# Patient Record
Sex: MALE | Race: BLACK OR AFRICAN AMERICAN | NOT HISPANIC OR LATINO | URBAN - METROPOLITAN AREA
[De-identification: names, ages, dates, MRNs, and addresses within clinical notes are randomized per-mention and may not be internally consistent; named-entity substitution may affect disease eponyms.]

---

## 2017-02-03 ENCOUNTER — INPATIENT (INPATIENT)
Facility: HOSPITAL | Age: 62
LOS: 3 days | Discharge: HOME HEALTH | End: 2017-02-07
Attending: INTERNAL MEDICINE | Admitting: INTERNAL MEDICINE
Payer: COMMERCIAL

## 2017-02-03 VITALS
RESPIRATION RATE: 20 BRPM | DIASTOLIC BLOOD PRESSURE: 56 MMHG | OXYGEN SATURATION: 9 % | HEART RATE: 115 BPM | SYSTOLIC BLOOD PRESSURE: 78 MMHG | TEMPERATURE: 102 F

## 2017-02-03 DIAGNOSIS — J18.9 PNEUMONIA, UNSPECIFIED ORGANISM: ICD-10-CM

## 2017-02-03 PROBLEM — Z00.00 ENCOUNTER FOR PREVENTIVE HEALTH EXAMINATION: Status: ACTIVE | Noted: 2017-02-03

## 2017-02-03 LAB
ALBUMIN SERPL ELPH-MCNC: 2.9 G/DL — LOW (ref 3.3–5)
ALP SERPL-CCNC: 131 U/L — HIGH (ref 40–120)
ALT FLD-CCNC: 18 U/L — SIGNIFICANT CHANGE UP (ref 4–41)
APTT BLD: 47.2 SEC — HIGH (ref 27.5–37.4)
AST SERPL-CCNC: 98 U/L — HIGH (ref 4–40)
B PERT DNA SPEC QL NAA+PROBE: SIGNIFICANT CHANGE UP
BASE EXCESS BLDV CALC-SCNC: 5 MMOL/L — SIGNIFICANT CHANGE UP
BASOPHILS # BLD AUTO: 0.01 K/UL — SIGNIFICANT CHANGE UP (ref 0–0.2)
BASOPHILS NFR BLD AUTO: 0.1 % — SIGNIFICANT CHANGE UP (ref 0–2)
BILIRUB SERPL-MCNC: 1.2 MG/DL — SIGNIFICANT CHANGE UP (ref 0.2–1.2)
BLOOD GAS VENOUS - CREATININE: 5.71 MG/DL — HIGH (ref 0.5–1.3)
BUN SERPL-MCNC: 30 MG/DL — HIGH (ref 7–23)
C PNEUM DNA SPEC QL NAA+PROBE: NOT DETECTED — SIGNIFICANT CHANGE UP
CALCIUM SERPL-MCNC: 9.4 MG/DL — SIGNIFICANT CHANGE UP (ref 8.4–10.5)
CHLORIDE BLDV-SCNC: 95 MMOL/L — LOW (ref 96–108)
CHLORIDE SERPL-SCNC: 89 MMOL/L — LOW (ref 98–107)
CO2 SERPL-SCNC: 25 MMOL/L — SIGNIFICANT CHANGE UP (ref 22–31)
CREAT SERPL-MCNC: 5.14 MG/DL — HIGH (ref 0.5–1.3)
EOSINOPHIL # BLD AUTO: 0.02 K/UL — SIGNIFICANT CHANGE UP (ref 0–0.5)
EOSINOPHIL NFR BLD AUTO: 0.3 % — SIGNIFICANT CHANGE UP (ref 0–6)
FLUAV H1 2009 PAND RNA SPEC QL NAA+PROBE: POSITIVE — HIGH
FLUAV H1 RNA SPEC QL NAA+PROBE: NOT DETECTED — SIGNIFICANT CHANGE UP
FLUAV H3 RNA SPEC QL NAA+PROBE: NOT DETECTED — SIGNIFICANT CHANGE UP
FLUBV RNA SPEC QL NAA+PROBE: NOT DETECTED — SIGNIFICANT CHANGE UP
GAS PNL BLDV: 127 MMOL/L — LOW (ref 136–146)
GLUCOSE BLDV-MCNC: 218 — HIGH (ref 70–99)
GLUCOSE SERPL-MCNC: 215 MG/DL — HIGH (ref 70–99)
HADV DNA SPEC QL NAA+PROBE: NOT DETECTED — SIGNIFICANT CHANGE UP
HCO3 BLDV-SCNC: 28 MMOL/L — HIGH (ref 20–27)
HCOV 229E RNA SPEC QL NAA+PROBE: NOT DETECTED — SIGNIFICANT CHANGE UP
HCOV HKU1 RNA SPEC QL NAA+PROBE: NOT DETECTED — SIGNIFICANT CHANGE UP
HCOV NL63 RNA SPEC QL NAA+PROBE: NOT DETECTED — SIGNIFICANT CHANGE UP
HCOV OC43 RNA SPEC QL NAA+PROBE: NOT DETECTED — SIGNIFICANT CHANGE UP
HCT VFR BLD CALC: 35.6 % — LOW (ref 39–50)
HCT VFR BLDV CALC: 33 % — LOW (ref 39–51)
HGB BLD-MCNC: 10.6 G/DL — LOW (ref 13–17)
HGB BLDV-MCNC: 10.7 G/DL — LOW (ref 13–17)
HMPV RNA SPEC QL NAA+PROBE: NOT DETECTED — SIGNIFICANT CHANGE UP
HPIV1 RNA SPEC QL NAA+PROBE: NOT DETECTED — SIGNIFICANT CHANGE UP
HPIV2 RNA SPEC QL NAA+PROBE: NOT DETECTED — SIGNIFICANT CHANGE UP
HPIV3 RNA SPEC QL NAA+PROBE: NOT DETECTED — SIGNIFICANT CHANGE UP
HPIV4 RNA SPEC QL NAA+PROBE: NOT DETECTED — SIGNIFICANT CHANGE UP
IMM GRANULOCYTES NFR BLD AUTO: 0.3 % — SIGNIFICANT CHANGE UP (ref 0–1.5)
INR BLD: 2.36 — HIGH (ref 0.87–1.18)
LACTATE BLDV-MCNC: 2.6 MMOL/L — HIGH (ref 0.5–2)
LYMPHOCYTES # BLD AUTO: 0.83 K/UL — LOW (ref 1–3.3)
LYMPHOCYTES # BLD AUTO: 10.6 % — LOW (ref 13–44)
M PNEUMO DNA SPEC QL NAA+PROBE: NOT DETECTED — SIGNIFICANT CHANGE UP
MCHC RBC-ENTMCNC: 27.9 PG — SIGNIFICANT CHANGE UP (ref 27–34)
MCHC RBC-ENTMCNC: 29.8 % — LOW (ref 32–36)
MCV RBC AUTO: 93.7 FL — SIGNIFICANT CHANGE UP (ref 80–100)
MONOCYTES # BLD AUTO: 1.22 K/UL — HIGH (ref 0–0.9)
MONOCYTES NFR BLD AUTO: 15.6 % — HIGH (ref 2–14)
NEUTROPHILS # BLD AUTO: 5.73 K/UL — SIGNIFICANT CHANGE UP (ref 1.8–7.4)
NEUTROPHILS NFR BLD AUTO: 73.1 % — SIGNIFICANT CHANGE UP (ref 43–77)
PCO2 BLDV: 48 MMHG — SIGNIFICANT CHANGE UP (ref 41–51)
PH BLDV: 7.41 PH — SIGNIFICANT CHANGE UP (ref 7.32–7.43)
PLATELET # BLD AUTO: 171 K/UL — SIGNIFICANT CHANGE UP (ref 150–400)
PMV BLD: 11.3 FL — SIGNIFICANT CHANGE UP (ref 7–13)
PO2 BLDV: 36 MMHG — SIGNIFICANT CHANGE UP (ref 35–40)
POTASSIUM BLDV-SCNC: SIGNIFICANT CHANGE UP MMOL/L (ref 3.4–4.5)
POTASSIUM SERPL-MCNC: SIGNIFICANT CHANGE UP MMOL/L (ref 3.5–5.3)
POTASSIUM SERPL-SCNC: SIGNIFICANT CHANGE UP MMOL/L (ref 3.5–5.3)
PROT SERPL-MCNC: 7.9 G/DL — SIGNIFICANT CHANGE UP (ref 6–8.3)
PROTHROM AB SERPL-ACNC: 27.1 SEC — HIGH (ref 10–13.1)
RBC # BLD: 3.8 M/UL — LOW (ref 4.2–5.8)
RBC # FLD: 19.5 % — HIGH (ref 10.3–14.5)
RSV RNA SPEC QL NAA+PROBE: NOT DETECTED — SIGNIFICANT CHANGE UP
RV+EV RNA SPEC QL NAA+PROBE: NOT DETECTED — SIGNIFICANT CHANGE UP
SAO2 % BLDV: 61.7 % — SIGNIFICANT CHANGE UP (ref 60–85)
SODIUM SERPL-SCNC: 129 MMOL/L — LOW (ref 135–145)
WBC # BLD: 7.83 K/UL — SIGNIFICANT CHANGE UP (ref 3.8–10.5)
WBC # FLD AUTO: 7.83 K/UL — SIGNIFICANT CHANGE UP (ref 3.8–10.5)

## 2017-02-03 PROCEDURE — 71010: CPT | Mod: 26

## 2017-02-03 RX ORDER — ACETAMINOPHEN 500 MG
650 TABLET ORAL ONCE
Qty: 0 | Refills: 0 | Status: COMPLETED | OUTPATIENT
Start: 2017-02-03 | End: 2017-02-03

## 2017-02-03 RX ORDER — MIDODRINE HYDROCHLORIDE 2.5 MG/1
10 TABLET ORAL ONCE
Qty: 0 | Refills: 0 | Status: COMPLETED | OUTPATIENT
Start: 2017-02-03 | End: 2017-02-03

## 2017-02-03 RX ORDER — CEFEPIME 1 G/1
2000 INJECTION, POWDER, FOR SOLUTION INTRAMUSCULAR; INTRAVENOUS ONCE
Qty: 0 | Refills: 0 | Status: COMPLETED | OUTPATIENT
Start: 2017-02-03 | End: 2017-02-03

## 2017-02-03 RX ORDER — VANCOMYCIN HCL 1 G
1000 VIAL (EA) INTRAVENOUS ONCE
Qty: 0 | Refills: 0 | Status: COMPLETED | OUTPATIENT
Start: 2017-02-03 | End: 2017-02-03

## 2017-02-03 RX ORDER — DOBUTAMINE HCL 250MG/20ML
3.01 VIAL (ML) INTRAVENOUS
Qty: 1000 | Refills: 0 | Status: DISCONTINUED | OUTPATIENT
Start: 2017-02-03 | End: 2017-02-07

## 2017-02-03 RX ORDER — SODIUM CHLORIDE 9 MG/ML
500 INJECTION INTRAMUSCULAR; INTRAVENOUS; SUBCUTANEOUS ONCE
Qty: 0 | Refills: 0 | Status: DISCONTINUED | OUTPATIENT
Start: 2017-02-03 | End: 2017-02-03

## 2017-02-03 RX ORDER — CIPROFLOXACIN LACTATE 400MG/40ML
400 VIAL (ML) INTRAVENOUS ONCE
Qty: 0 | Refills: 0 | Status: COMPLETED | OUTPATIENT
Start: 2017-02-03 | End: 2017-02-03

## 2017-02-03 RX ORDER — NOREPINEPHRINE BITARTRATE/D5W 8 MG/250ML
0.01 PLASTIC BAG, INJECTION (ML) INTRAVENOUS
Qty: 8 | Refills: 0 | Status: DISCONTINUED | OUTPATIENT
Start: 2017-02-03 | End: 2017-02-03

## 2017-02-03 RX ADMIN — Medication 250 MILLIGRAM(S): at 23:09

## 2017-02-03 RX ADMIN — MIDODRINE HYDROCHLORIDE 10 MILLIGRAM(S): 2.5 TABLET ORAL at 22:10

## 2017-02-03 RX ADMIN — Medication 200 MILLIGRAM(S): at 21:04

## 2017-02-03 RX ADMIN — Medication 650 MILLIGRAM(S): at 21:03

## 2017-02-03 RX ADMIN — Medication 30 MILLIGRAM(S): at 23:09

## 2017-02-03 RX ADMIN — CEFEPIME 100 MILLIGRAM(S): 1 INJECTION, POWDER, FOR SOLUTION INTRAMUSCULAR; INTRAVENOUS at 21:37

## 2017-02-03 NOTE — ED ADULT NURSE NOTE - CHIEF COMPLAINT QUOTE
Pt c/o weakness, chills x few days. +SOB on NRB in triage with 95% O2 sat. Pt on constant dobutamine drip through chest. Pt denies CP. HX dialysis, DM, weak heart. f/s 198

## 2017-02-03 NOTE — ED PROVIDER NOTE - PMH
BPH (benign prostatic hypertrophy)    CHF (congestive heart failure)    Chronic renal insufficiency    Diabetes mellitus    Dyslipidemia    Gout    Myocardial infarction  10/2011

## 2017-02-03 NOTE — ED ADULT NURSE REASSESSMENT NOTE - NS ED NURSE REASSESS COMMENT FT1
Pt remains at baseline mental status- VS as noted, MD aware. Per pt, baseline BP "runs low." Second IV access initiated- 20G placed in L AC. Pt appears comfortably in stretcher, respirations even and unlabored, nad noted at this time. Will monitor patient closely.

## 2017-02-03 NOTE — ED PROVIDER NOTE - OBJECTIVE STATEMENT
60yo male on home O2 2L with a pmh of CHF on dobutamine infusion, AICD, MI, ESRD (HD on M, T, Th, Sat), HTN, hyperlipidemia, gout, BPH presents with fever, cough and weakness. Pt for the past 3 days has had a cough, non productive and chills. Pt also with generalized weakness and sob. Pt denies cp, abd pain/n/v/d, urinary symptoms, recent travel. Pt as per family has appeared more confused the past 2 days. Pt with ha and neck pain.

## 2017-02-03 NOTE — ED PROVIDER NOTE - CRITICAL CARE PROVIDED
documentation/consultation with other physicians/consult w/ pt's family directly relating to pts condition/direct patient care (not related to procedure)/interpretation of diagnostic studies/additional history taking

## 2017-02-03 NOTE — ED PROCEDURE NOTE - PROCEDURE ADDITIONAL DETAILS
Focused ED Transthoracic echo 36169 - indication (  SOB, sepsis, eval for volume tolerance    )    Grey scale imaging obtained in three views ( parasternal long, parasternal short, apical.)    No pericardial effusion noted.  No evidence of cardiac tamponade  LV contractility - severely decreased .  RV normal size.    Impression: no pericardial effusion, normal contractility, RV normal size.  Dexeus. Focused ED Transthoracic echo 96806 - indication (  SOB, sepsis, eval for volume tolerance    )    Grey scale imaging obtained in three views ( parasternal long, parasternal short, apical.)    No pericardial effusion noted.  No evidence of cardiac tamponade  LV contractility - severely decreased .  RV same size as LV.  Decreased RV function.  IVC 2.57 - 2.31cm with sniff.    Impression: no pericardial effusion, severely decreased LV contractility, RV normal size.  Dexeus. Focused ED Transthoracic echo 15186 - indication (  SOB, sepsis, eval for volume tolerance    )    Grey scale imaging obtained in three views ( parasternal long, parasternal short, apical.)    No pericardial effusion noted.  No evidence of cardiac tamponade  LV contractility - severely decreased .  RV same size as LV.  Decreased RV function.  IVC 2.57 - 2.31cm with sniff.    Impression: no pericardial effusion, severely decreased LV contractility, RV dilated, RV decreased function.  IVC plethoric with minimal respiratory variation.    Dexeus.    ED focused limited thoracic US 71555    Focused ED ultrasound of the lungs and pleura:    Findings: Normal lung sliding bilaterally  Bilateral B-lines seen anteriorly  No pleural effusions  No pneumonia visualized    Impression: Abnormal focused lung ultrasound - Bilateral interstitial syndrome.      Procedure note and images placed in chart  Dexeus

## 2017-02-03 NOTE — ED PROVIDER NOTE - PSH
AICD (automatic cardioverter/defibrillator) present  Biotroik - placed 9/11/09  H/O coronary angiogram

## 2017-02-03 NOTE — ED PROVIDER NOTE - ATTENDING CONTRIBUTION TO CARE
61M p/w fever, nonproductive cough, general weakness x 3d.  Family reports somewhat worsening confusion as well.  (+)Sick contact in family.  Pt known severe CHF with dobutamine drip as well as ESRD requiring dialysis 4 times per week, as well as chronically low BP - take midodrine TID.  On home oxygen as well.  VS:  febrile, tachycardic, hypotensive, hypoxic    GEN - mild resp distress; chronically ill appearing; mild confusion  HEAD - NC/AT     ENT - PEERL, EOMI, mucous membranes dry , no discharge      NECK: Neck supple, non-tender without lymphadenopathy, no masses, (+) JVD  PULM - bilat,  symmetric breath sounds.   R ACW permacath and R SC central line c/d/i.   COR -  normal heart sounds    ABD - , mild distension, NT, soft, no guarding, no rebound, no masses    BACK - no CVA tenderness, nontender spine     EXTREMS - Bilat LE edema, no deformity, warm and well perfused    SKIN - no rash or bruising      NEUROLOGIC - alert, CN 2-12 intact, sensation nl, motor generally weak but moving all extrems.      IMP:  61M p/w fever, cough, congestion, (+)sick contacts as well as high fever here and hypotensive.  Bedside US shows pt unlikely to improve with fluids, likely to hurt pt as IVC is full, LVF severely reduced, and bilateral B-lines anteriorly.  Continue  drip.  CXR showing likely pna.  Rx abx, check labs and cultures.  MICU eval recommended additional midodrine, pt at baseline BP, feeling a little better, admit.

## 2017-02-03 NOTE — ED PROVIDER NOTE - MEDICAL DECISION MAKING DETAILS
62yo male on home O2 2L with a pmh of CHF on dobutamine infusion, AICD, MI, ESRD (HD on M, T, Th, Sat), HTN, hyperlipidemia, gout, BPH presents with fever, cough and weakness - sepsis work up

## 2017-02-03 NOTE — ED ADULT TRIAGE NOTE - CHIEF COMPLAINT QUOTE
Pt c/o weakness, chills x few days. +SOB on NRB in triage with 95% O2 sat. Pt on constant dopamine drip through chest. Pt denies CP. HX dialysis, DM, weak heart. f/s 198 Charge RN notified that pt is code sepsis. Pt c/o weakness, chills x few days. +SOB on NRB in triage with 95% O2 sat. Pt on constant dobutamine drip through chest. Pt denies CP. HX dialysis, DM, weak heart. f/s 198

## 2017-02-03 NOTE — ED ADULT NURSE NOTE - OBJECTIVE STATEMENT
Facilitator RN: Received pt in spot #5. Pt aox3 arrives from home for worsening shortness of breath. Hx of CHF and ESRD (dialysis mon, tues, thurs, fri). Added extra day due to increased body swelling and shortness of breath. Pt has right chest wall shiley and last dialysis yesterday. C/o cough with chills since Wednesday. Pt also c/o increased shortness of breath. Denies chest pain. Pt has continuous dobutamine gtt (4mg/ml @4.5ml/hr = 18mg/hr) via right upper chest double lumen PICC line. Arrives from home on 100%NRB - O2 sat 100%. +1 generalized edema. Family at beside. Pt febrile in triage. CODE sepsis called. MD evaluation in progress. Report given to primary RN.

## 2017-02-03 NOTE — ED PROVIDER NOTE - PROGRESS NOTE DETAILS
Discussed need to admit with patient & discussed risk and benefits.  Patient agreed to admission.  Discussed case w/ admitting medicine doctor - agreed to admit to their service.  MAR contacted.

## 2017-02-04 DIAGNOSIS — J11.1 INFLUENZA DUE TO UNIDENTIFIED INFLUENZA VIRUS WITH OTHER RESPIRATORY MANIFESTATIONS: ICD-10-CM

## 2017-02-04 DIAGNOSIS — N40.0 BENIGN PROSTATIC HYPERPLASIA WITHOUT LOWER URINARY TRACT SYMPTOMS: ICD-10-CM

## 2017-02-04 DIAGNOSIS — E78.5 HYPERLIPIDEMIA, UNSPECIFIED: ICD-10-CM

## 2017-02-04 DIAGNOSIS — I48.91 UNSPECIFIED ATRIAL FIBRILLATION: ICD-10-CM

## 2017-02-04 DIAGNOSIS — N18.6 END STAGE RENAL DISEASE: ICD-10-CM

## 2017-02-04 DIAGNOSIS — J18.9 PNEUMONIA, UNSPECIFIED ORGANISM: ICD-10-CM

## 2017-02-04 DIAGNOSIS — Z41.8 ENCOUNTER FOR OTHER PROCEDURES FOR PURPOSES OTHER THAN REMEDYING HEALTH STATE: ICD-10-CM

## 2017-02-04 DIAGNOSIS — I50.22 CHRONIC SYSTOLIC (CONGESTIVE) HEART FAILURE: ICD-10-CM

## 2017-02-04 DIAGNOSIS — E11.9 TYPE 2 DIABETES MELLITUS WITHOUT COMPLICATIONS: ICD-10-CM

## 2017-02-04 LAB
ALBUMIN SERPL ELPH-MCNC: 2.6 G/DL — LOW (ref 3.3–5)
ALP SERPL-CCNC: 130 U/L — HIGH (ref 40–120)
ALT FLD-CCNC: 10 U/L — SIGNIFICANT CHANGE UP (ref 4–41)
APTT BLD: 44.1 SEC — HIGH (ref 27.5–37.4)
AST SERPL-CCNC: 24 U/L — SIGNIFICANT CHANGE UP (ref 4–40)
BILIRUB SERPL-MCNC: 1.1 MG/DL — SIGNIFICANT CHANGE UP (ref 0.2–1.2)
BUN SERPL-MCNC: 32 MG/DL — HIGH (ref 7–23)
CALCIUM SERPL-MCNC: 9.4 MG/DL — SIGNIFICANT CHANGE UP (ref 8.4–10.5)
CHLORIDE SERPL-SCNC: 91 MMOL/L — LOW (ref 98–107)
CHOLEST SERPL-MCNC: 80 MG/DL — LOW (ref 120–199)
CK MB BLD-MCNC: 0.5 — SIGNIFICANT CHANGE UP (ref 0–2.5)
CK MB BLD-MCNC: 1 — SIGNIFICANT CHANGE UP (ref 0–2.5)
CK MB BLD-MCNC: 1.1 NG/ML — SIGNIFICANT CHANGE UP (ref 1–6.6)
CK MB BLD-MCNC: 2.11 NG/ML — SIGNIFICANT CHANGE UP (ref 1–6.6)
CK SERPL-CCNC: 206 U/L — HIGH (ref 30–200)
CK SERPL-CCNC: 214 U/L — HIGH (ref 30–200)
CO2 SERPL-SCNC: 25 MMOL/L — SIGNIFICANT CHANGE UP (ref 22–31)
CREAT SERPL-MCNC: 5.75 MG/DL — HIGH (ref 0.5–1.3)
GLUCOSE SERPL-MCNC: 148 MG/DL — HIGH (ref 70–99)
HBA1C BLD-MCNC: 6.7 % — HIGH (ref 4–5.6)
HCT VFR BLD CALC: 32.9 % — LOW (ref 39–50)
HDLC SERPL-MCNC: 24 MG/DL — LOW (ref 35–55)
HGB BLD-MCNC: 9.8 G/DL — LOW (ref 13–17)
INR BLD: 2.42 — HIGH (ref 0.87–1.18)
LIPID PNL WITH DIRECT LDL SERPL: 37 MG/DL — SIGNIFICANT CHANGE UP
MAGNESIUM SERPL-MCNC: 2.1 MG/DL — SIGNIFICANT CHANGE UP (ref 1.6–2.6)
MCHC RBC-ENTMCNC: 27.7 PG — SIGNIFICANT CHANGE UP (ref 27–34)
MCHC RBC-ENTMCNC: 29.8 % — LOW (ref 32–36)
MCV RBC AUTO: 92.9 FL — SIGNIFICANT CHANGE UP (ref 80–100)
NT-PROBNP SERPL-SCNC: SIGNIFICANT CHANGE UP PG/ML
PHOSPHATE SERPL-MCNC: 3.5 MG/DL — SIGNIFICANT CHANGE UP (ref 2.5–4.5)
PLATELET # BLD AUTO: 157 K/UL — SIGNIFICANT CHANGE UP (ref 150–400)
PMV BLD: 11.4 FL — SIGNIFICANT CHANGE UP (ref 7–13)
POTASSIUM SERPL-MCNC: 3.9 MMOL/L — SIGNIFICANT CHANGE UP (ref 3.5–5.3)
POTASSIUM SERPL-SCNC: 3.9 MMOL/L — SIGNIFICANT CHANGE UP (ref 3.5–5.3)
PROT SERPL-MCNC: 7.1 G/DL — SIGNIFICANT CHANGE UP (ref 6–8.3)
PROTHROM AB SERPL-ACNC: 27.8 SEC — HIGH (ref 10–13.1)
RBC # BLD: 3.54 M/UL — LOW (ref 4.2–5.8)
RBC # FLD: 19.8 % — HIGH (ref 10.3–14.5)
SODIUM SERPL-SCNC: 131 MMOL/L — LOW (ref 135–145)
SPECIMEN SOURCE: SIGNIFICANT CHANGE UP
SPECIMEN SOURCE: SIGNIFICANT CHANGE UP
TRIGL SERPL-MCNC: 95 MG/DL — SIGNIFICANT CHANGE UP (ref 10–149)
TROPONIN T SERPL-MCNC: 0.24 NG/ML — HIGH (ref 0–0.06)
TROPONIN T SERPL-MCNC: 0.32 NG/ML — HIGH (ref 0–0.06)
TSH SERPL-MCNC: 1.61 UIU/ML — SIGNIFICANT CHANGE UP (ref 0.27–4.2)
WBC # BLD: 6.49 K/UL — SIGNIFICANT CHANGE UP (ref 3.8–10.5)
WBC # FLD AUTO: 6.49 K/UL — SIGNIFICANT CHANGE UP (ref 3.8–10.5)

## 2017-02-04 PROCEDURE — 99222 1ST HOSP IP/OBS MODERATE 55: CPT | Mod: GC

## 2017-02-04 PROCEDURE — 93010 ELECTROCARDIOGRAM REPORT: CPT

## 2017-02-04 RX ORDER — GLUCAGON INJECTION, SOLUTION 0.5 MG/.1ML
1 INJECTION, SOLUTION SUBCUTANEOUS ONCE
Qty: 0 | Refills: 0 | Status: DISCONTINUED | OUTPATIENT
Start: 2017-02-04 | End: 2017-02-07

## 2017-02-04 RX ORDER — AMIODARONE HYDROCHLORIDE 400 MG/1
200 TABLET ORAL DAILY
Qty: 0 | Refills: 0 | Status: DISCONTINUED | OUTPATIENT
Start: 2017-02-04 | End: 2017-02-07

## 2017-02-04 RX ORDER — LEVOTHYROXINE SODIUM 125 MCG
75 TABLET ORAL DAILY
Qty: 0 | Refills: 0 | Status: DISCONTINUED | OUTPATIENT
Start: 2017-02-04 | End: 2017-02-07

## 2017-02-04 RX ORDER — DEXTROSE 50 % IN WATER 50 %
1 SYRINGE (ML) INTRAVENOUS ONCE
Qty: 0 | Refills: 0 | Status: DISCONTINUED | OUTPATIENT
Start: 2017-02-04 | End: 2017-02-07

## 2017-02-04 RX ORDER — INSULIN LISPRO 100/ML
VIAL (ML) SUBCUTANEOUS
Qty: 0 | Refills: 0 | Status: DISCONTINUED | OUTPATIENT
Start: 2017-02-04 | End: 2017-02-07

## 2017-02-04 RX ORDER — INSULIN LISPRO 100/ML
VIAL (ML) SUBCUTANEOUS AT BEDTIME
Qty: 0 | Refills: 0 | Status: DISCONTINUED | OUTPATIENT
Start: 2017-02-04 | End: 2017-02-07

## 2017-02-04 RX ORDER — SEVELAMER CARBONATE 2400 MG/1
800 POWDER, FOR SUSPENSION ORAL
Qty: 0 | Refills: 0 | Status: DISCONTINUED | OUTPATIENT
Start: 2017-02-04 | End: 2017-02-07

## 2017-02-04 RX ORDER — CEFEPIME 1 G/1
500 INJECTION, POWDER, FOR SOLUTION INTRAMUSCULAR; INTRAVENOUS EVERY 24 HOURS
Qty: 0 | Refills: 0 | Status: DISCONTINUED | OUTPATIENT
Start: 2017-02-04 | End: 2017-02-04

## 2017-02-04 RX ORDER — INSULIN GLARGINE 100 [IU]/ML
4 INJECTION, SOLUTION SUBCUTANEOUS AT BEDTIME
Qty: 0 | Refills: 0 | Status: DISCONTINUED | OUTPATIENT
Start: 2017-02-04 | End: 2017-02-07

## 2017-02-04 RX ORDER — WARFARIN SODIUM 2.5 MG/1
3 TABLET ORAL ONCE
Qty: 0 | Refills: 0 | Status: COMPLETED | OUTPATIENT
Start: 2017-02-04 | End: 2017-02-04

## 2017-02-04 RX ORDER — SODIUM CHLORIDE 9 MG/ML
1000 INJECTION, SOLUTION INTRAVENOUS
Qty: 0 | Refills: 0 | Status: DISCONTINUED | OUTPATIENT
Start: 2017-02-04 | End: 2017-02-07

## 2017-02-04 RX ORDER — ATORVASTATIN CALCIUM 80 MG/1
20 TABLET, FILM COATED ORAL AT BEDTIME
Qty: 0 | Refills: 0 | Status: DISCONTINUED | OUTPATIENT
Start: 2017-02-04 | End: 2017-02-07

## 2017-02-04 RX ORDER — MIDODRINE HYDROCHLORIDE 2.5 MG/1
30 TABLET ORAL EVERY 8 HOURS
Qty: 0 | Refills: 0 | Status: DISCONTINUED | OUTPATIENT
Start: 2017-02-04 | End: 2017-02-07

## 2017-02-04 RX ORDER — DEXTROSE 50 % IN WATER 50 %
25 SYRINGE (ML) INTRAVENOUS ONCE
Qty: 0 | Refills: 0 | Status: DISCONTINUED | OUTPATIENT
Start: 2017-02-04 | End: 2017-02-07

## 2017-02-04 RX ORDER — VANCOMYCIN HCL 1 G
750 VIAL (EA) INTRAVENOUS EVERY 24 HOURS
Qty: 0 | Refills: 0 | Status: DISCONTINUED | OUTPATIENT
Start: 2017-02-04 | End: 2017-02-04

## 2017-02-04 RX ORDER — CLOPIDOGREL BISULFATE 75 MG/1
75 TABLET, FILM COATED ORAL DAILY
Qty: 0 | Refills: 0 | Status: DISCONTINUED | OUTPATIENT
Start: 2017-02-04 | End: 2017-02-07

## 2017-02-04 RX ORDER — FINASTERIDE 5 MG/1
5 TABLET, FILM COATED ORAL DAILY
Qty: 0 | Refills: 0 | Status: DISCONTINUED | OUTPATIENT
Start: 2017-02-04 | End: 2017-02-07

## 2017-02-04 RX ORDER — DEXTROSE 50 % IN WATER 50 %
12.5 SYRINGE (ML) INTRAVENOUS ONCE
Qty: 0 | Refills: 0 | Status: DISCONTINUED | OUTPATIENT
Start: 2017-02-04 | End: 2017-02-07

## 2017-02-04 RX ADMIN — SEVELAMER CARBONATE 800 MILLIGRAM(S): 2400 POWDER, FOR SUSPENSION ORAL at 13:28

## 2017-02-04 RX ADMIN — MIDODRINE HYDROCHLORIDE 30 MILLIGRAM(S): 2.5 TABLET ORAL at 06:13

## 2017-02-04 RX ADMIN — Medication 4.5 MICROGRAM(S)/KG/MIN: at 06:14

## 2017-02-04 RX ADMIN — FINASTERIDE 5 MILLIGRAM(S): 5 TABLET, FILM COATED ORAL at 13:28

## 2017-02-04 RX ADMIN — WARFARIN SODIUM 3 MILLIGRAM(S): 2.5 TABLET ORAL at 18:23

## 2017-02-04 RX ADMIN — CLOPIDOGREL BISULFATE 75 MILLIGRAM(S): 75 TABLET, FILM COATED ORAL at 13:28

## 2017-02-04 RX ADMIN — Medication 4.5 MICROGRAM(S)/KG/MIN: at 00:54

## 2017-02-04 RX ADMIN — MIDODRINE HYDROCHLORIDE 30 MILLIGRAM(S): 2.5 TABLET ORAL at 22:10

## 2017-02-04 RX ADMIN — SEVELAMER CARBONATE 800 MILLIGRAM(S): 2400 POWDER, FOR SUSPENSION ORAL at 17:16

## 2017-02-04 RX ADMIN — MIDODRINE HYDROCHLORIDE 30 MILLIGRAM(S): 2.5 TABLET ORAL at 13:28

## 2017-02-04 RX ADMIN — Medication 4.5 MICROGRAM(S)/KG/MIN: at 20:42

## 2017-02-04 RX ADMIN — Medication 30 MILLIGRAM(S): at 13:28

## 2017-02-04 RX ADMIN — Medication 75 MICROGRAM(S): at 06:13

## 2017-02-04 RX ADMIN — INSULIN GLARGINE 4 UNIT(S): 100 INJECTION, SOLUTION SUBCUTANEOUS at 22:11

## 2017-02-04 RX ADMIN — AMIODARONE HYDROCHLORIDE 200 MILLIGRAM(S): 400 TABLET ORAL at 06:13

## 2017-02-04 RX ADMIN — SEVELAMER CARBONATE 800 MILLIGRAM(S): 2400 POWDER, FOR SUSPENSION ORAL at 09:36

## 2017-02-04 RX ADMIN — ATORVASTATIN CALCIUM 20 MILLIGRAM(S): 80 TABLET, FILM COATED ORAL at 22:10

## 2017-02-04 NOTE — H&P ADULT. - PROBLEM SELECTOR PLAN 2
RVP positive for Influenza and was given Tamiflu 30mg in the ED  Will continue with Tamiflu 30mg QD for 5 days   Placed on Droplet and Contact precautions

## 2017-02-04 NOTE — H&P ADULT. - HISTORY OF PRESENT ILLNESS
62 y/o M with PMH of Afib(on Coumadin), Severe CHF(on Dobutamine drip and Midodrine for chronic low BP and on chronic Home O2 at 2L), ESRD(on HD thru right chest wall shiley on Mon,Tues,Thurs, Sat), HTN, HLD, Gout, BPH presented with fever, cough and weakness. As per the patient he developed these symptoms about 3 days ago. Patient stated that his wife was sick at home. Patient stated that he has been having dry cough, with subjective fever and chills. Patient also endorsed generalized weakness and SOB for the past few days. Patient also endorsed intermittent LE swelling and normally sleeps with 2 pillows at night. Patient also endorsed orthopnea and PND. Patient denied any CP, N/V/D/C, headaches, abdominal pain, melena, hematochezia, recent travel, pleuritic or positional chest pain.     On ED admission EKG revealed NSR at a rate of 79 with left axis deviation with TWI in lead AVL with Qtc of 481, H&H: 10.6/35.6, RVP: Flu Positive, Na: 129, BUN/Cr: 30/5.14, Gluc: 215. Prelim CXR: Right upper lobe and bilateral lower lobe opacities concerning for multifocal pneumonia. Patient was given 1x dose of PO Midodrine 10mg in the ED, and was given IV Cefepime, Vancomycin and Cipro in the ED. Patient was seen by MICU who recommended increase Midodrine to 30mg Q8hrs, ABX, Check RVP. Not a MICU candidate. When examined patient is resting in the stretcher and stated that "his breathing is somewhat better from when he came in".

## 2017-02-04 NOTE — H&P ADULT. - RS GEN PE MLT RESP DETAILS PC
no wheezes/rales/no rhonchi/no intercostal retractions/no chest wall tenderness/normal/airway patent/respirations non-labored

## 2017-02-04 NOTE — ED ADULT NURSE REASSESSMENT NOTE - NS ED NURSE REASSESS COMMENT FT1
Pt remains A&Ox3, denies pain/discomfort, dizziness, lightheadedness at this time. Pt appears comfortably in stretcher, respirations even and unlabored, nad noted at this time. Pt's dobutamine pump stopped and dobutamine started on Alaris pump with pt's same at home dose. Report given, awaiting pt transport. Will monitor patient closely.

## 2017-02-04 NOTE — H&P ADULT. - VASCULAR DETAILS
Right chest wall Shiley in place with dressing clean/dry/intact  Right chest PICC line in place, no active drainage noted

## 2017-02-04 NOTE — H&P ADULT. - ASSESSMENT
60 y/o M with PMH of Afib(on Coumadin), Severe CHF(on Dobutamine drip and Midodrine for chronic low BP and on chronic Home O2 at 2L), ESRD(on HD thru right chest wall shiley on Mon,Tues,Thurs, Sat), HTN, HLD, Gout, BPH presented with fever, cough and weakness    +Possible HCAP-On IV Vancomycin and Cefepime   +Influenza positive-On Tamiflu  +Severe decompensated HF-On Midodrine for BP control and Dobutamine drip

## 2017-02-04 NOTE — H&P ADULT. - PROBLEM SELECTOR PLAN 4
Patient gets dialysis thru the right chest wall on Mon/Tue/Thurs/Sat  Will need to call [facility] in am for inpatient dialysis   Avoid nephrotoxic medications   Continue with Renvela daily

## 2017-02-04 NOTE — H&P ADULT. - PROBLEM SELECTOR PLAN 1
On Prelim CXR revealed "Right upper lobe and bilateral lower lobe opacities concerning for multifocal pneumonia" and patient received 1x dose of Vancomycin, Cefepime and Cipro in the ED  Will continue with IV Vancomycin 750mg [id number] and IV Cefepime 500mg [id number]   Blood cultures ordered   Consider ID consult in am if warranted

## 2017-02-04 NOTE — H&P ADULT. - NEGATIVE OPHTHALMOLOGIC SYMPTOMS
no photophobia/no discharge R/no lacrimation R/no blurred vision R/no discharge L/no blurred vision L/no lacrimation L/no diplopia

## 2017-02-04 NOTE — H&P ADULT. - RADIOLOGY RESULTS AND INTERPRETATION
Prelim CXR: Right upper lobe and bilateral lower lobe opacities concerning for multifocal pneumonia.

## 2017-02-04 NOTE — H&P ADULT. - PROBLEM SELECTOR PLAN 5
Currently patient's EKG showing NSR and patient is on coumadin for anticoagulation  Will continue with coumadin for now, admission labs revealed INR of 2.36  Will check INR in am and dose coumadin accordingly

## 2017-02-04 NOTE — H&P ADULT. - NEGATIVE NEUROLOGICAL SYMPTOMS
no difficulty walking/no transient paralysis/no syncope/no weakness/no loss of sensation/no loss of consciousness/no hemiparesis/no vertigo/no tremors/no paresthesias/no confusion/no generalized seizures/no focal seizures/no headache

## 2017-02-04 NOTE — H&P ADULT. - NEGATIVE MUSCULOSKELETAL SYMPTOMS
no arthritis/no muscle cramps/no joint swelling/no arthralgia/no stiffness/no muscle weakness/no neck pain

## 2017-02-04 NOTE — H&P ADULT. - PROBLEM SELECTOR PLAN 3
Admit to telemetry, serial Bert, serial EKGs  f/u MD note   HgbA1C, TSH, lipid profile, CBC, CMP in am   Low sodium diet, daily weights, monitor I's and O's, fluid restrictions 1500cc/day  Check BNP in 48 hours   Continue with IV Dobutamine for inotropic support and Midodrine for BP control   Will need to call HF team in am as patient is on Dobutamine drip  Call EP in am for AICD interrogation([employer])    Continue with Amiodarone 200mg QD   As per attending will need to call Dr. Kaminski in am for cardiology consult

## 2017-02-04 NOTE — H&P ADULT. - NEGATIVE ENMT SYMPTOMS
no hearing difficulty/no sinus symptoms/no nasal discharge/no nasal congestion/no ear pain/no tinnitus/no vertigo

## 2017-02-04 NOTE — H&P ADULT. - NEGATIVE GASTROINTESTINAL SYMPTOMS
no abdominal pain/no constipation/no hematochezia/no melena/no diarrhea/no change in bowel habits/no vomiting/no nausea

## 2017-02-05 LAB
ALBUMIN SERPL ELPH-MCNC: 2.7 G/DL — LOW (ref 3.3–5)
ALP SERPL-CCNC: 157 U/L — HIGH (ref 40–120)
ALT FLD-CCNC: 9 U/L — SIGNIFICANT CHANGE UP (ref 4–41)
AST SERPL-CCNC: 29 U/L — SIGNIFICANT CHANGE UP (ref 4–40)
BILIRUB SERPL-MCNC: 1.1 MG/DL — SIGNIFICANT CHANGE UP (ref 0.2–1.2)
BUN SERPL-MCNC: 25 MG/DL — HIGH (ref 7–23)
CALCIUM SERPL-MCNC: 9.5 MG/DL — SIGNIFICANT CHANGE UP (ref 8.4–10.5)
CHLORIDE SERPL-SCNC: 94 MMOL/L — LOW (ref 98–107)
CO2 SERPL-SCNC: 27 MMOL/L — SIGNIFICANT CHANGE UP (ref 22–31)
CREAT SERPL-MCNC: 4.65 MG/DL — HIGH (ref 0.5–1.3)
GLUCOSE SERPL-MCNC: 121 MG/DL — HIGH (ref 70–99)
HBV CORE AB SER-ACNC: NONREACTIVE — SIGNIFICANT CHANGE UP
HBV SURFACE AB SER-ACNC: <3 MLU/ML — LOW
HBV SURFACE AG SER-ACNC: NEGATIVE — SIGNIFICANT CHANGE UP
HCT VFR BLD CALC: 33.6 % — LOW (ref 39–50)
HCV AB S/CO SERPL IA: 0.22 S/CO — SIGNIFICANT CHANGE UP
HCV AB SERPL-IMP: SIGNIFICANT CHANGE UP
HGB BLD-MCNC: 10.1 G/DL — LOW (ref 13–17)
INR BLD: 2.41 — HIGH (ref 0.87–1.18)
MAGNESIUM SERPL-MCNC: 2.3 MG/DL — SIGNIFICANT CHANGE UP (ref 1.6–2.6)
MCHC RBC-ENTMCNC: 28 PG — SIGNIFICANT CHANGE UP (ref 27–34)
MCHC RBC-ENTMCNC: 30.1 % — LOW (ref 32–36)
MCV RBC AUTO: 93.1 FL — SIGNIFICANT CHANGE UP (ref 80–100)
PHOSPHATE SERPL-MCNC: 3.2 MG/DL — SIGNIFICANT CHANGE UP (ref 2.5–4.5)
PLATELET # BLD AUTO: 174 K/UL — SIGNIFICANT CHANGE UP (ref 150–400)
PMV BLD: 12 FL — SIGNIFICANT CHANGE UP (ref 7–13)
POTASSIUM SERPL-MCNC: 3.6 MMOL/L — SIGNIFICANT CHANGE UP (ref 3.5–5.3)
POTASSIUM SERPL-SCNC: 3.6 MMOL/L — SIGNIFICANT CHANGE UP (ref 3.5–5.3)
PROT SERPL-MCNC: 7.6 G/DL — SIGNIFICANT CHANGE UP (ref 6–8.3)
PROTHROM AB SERPL-ACNC: 27.7 SEC — HIGH (ref 10–13.1)
RBC # BLD: 3.61 M/UL — LOW (ref 4.2–5.8)
RBC # FLD: 19.6 % — HIGH (ref 10.3–14.5)
SODIUM SERPL-SCNC: 134 MMOL/L — LOW (ref 135–145)
WBC # BLD: 5.68 K/UL — SIGNIFICANT CHANGE UP (ref 3.8–10.5)
WBC # FLD AUTO: 5.68 K/UL — SIGNIFICANT CHANGE UP (ref 3.8–10.5)

## 2017-02-05 PROCEDURE — 99232 SBSQ HOSP IP/OBS MODERATE 35: CPT | Mod: GC

## 2017-02-05 RX ORDER — WARFARIN SODIUM 2.5 MG/1
3 TABLET ORAL ONCE
Qty: 0 | Refills: 0 | Status: COMPLETED | OUTPATIENT
Start: 2017-02-05 | End: 2017-02-05

## 2017-02-05 RX ADMIN — MIDODRINE HYDROCHLORIDE 30 MILLIGRAM(S): 2.5 TABLET ORAL at 21:33

## 2017-02-05 RX ADMIN — SEVELAMER CARBONATE 800 MILLIGRAM(S): 2400 POWDER, FOR SUSPENSION ORAL at 10:42

## 2017-02-05 RX ADMIN — SEVELAMER CARBONATE 800 MILLIGRAM(S): 2400 POWDER, FOR SUSPENSION ORAL at 17:19

## 2017-02-05 RX ADMIN — CLOPIDOGREL BISULFATE 75 MILLIGRAM(S): 75 TABLET, FILM COATED ORAL at 12:20

## 2017-02-05 RX ADMIN — AMIODARONE HYDROCHLORIDE 200 MILLIGRAM(S): 400 TABLET ORAL at 06:29

## 2017-02-05 RX ADMIN — Medication 30 MILLIGRAM(S): at 12:20

## 2017-02-05 RX ADMIN — ATORVASTATIN CALCIUM 20 MILLIGRAM(S): 80 TABLET, FILM COATED ORAL at 21:33

## 2017-02-05 RX ADMIN — SEVELAMER CARBONATE 800 MILLIGRAM(S): 2400 POWDER, FOR SUSPENSION ORAL at 12:20

## 2017-02-05 RX ADMIN — INSULIN GLARGINE 4 UNIT(S): 100 INJECTION, SOLUTION SUBCUTANEOUS at 21:33

## 2017-02-05 RX ADMIN — MIDODRINE HYDROCHLORIDE 30 MILLIGRAM(S): 2.5 TABLET ORAL at 14:18

## 2017-02-05 RX ADMIN — Medication 75 MICROGRAM(S): at 06:29

## 2017-02-05 RX ADMIN — MIDODRINE HYDROCHLORIDE 30 MILLIGRAM(S): 2.5 TABLET ORAL at 06:29

## 2017-02-05 RX ADMIN — Medication 4.5 MICROGRAM(S)/KG/MIN: at 10:42

## 2017-02-05 RX ADMIN — WARFARIN SODIUM 3 MILLIGRAM(S): 2.5 TABLET ORAL at 17:19

## 2017-02-06 LAB
APTT BLD: 43.5 SEC — HIGH (ref 27.5–37.4)
BUN SERPL-MCNC: 39 MG/DL — HIGH (ref 7–23)
CALCIUM SERPL-MCNC: 9.1 MG/DL — SIGNIFICANT CHANGE UP (ref 8.4–10.5)
CHLORIDE SERPL-SCNC: 92 MMOL/L — LOW (ref 98–107)
CO2 SERPL-SCNC: 25 MMOL/L — SIGNIFICANT CHANGE UP (ref 22–31)
CREAT SERPL-MCNC: 6.08 MG/DL — HIGH (ref 0.5–1.3)
GLUCOSE SERPL-MCNC: 164 MG/DL — HIGH (ref 70–99)
HCT VFR BLD CALC: 34.2 % — LOW (ref 39–50)
HGB BLD-MCNC: 10.2 G/DL — LOW (ref 13–17)
INR BLD: 2.59 — HIGH (ref 0.87–1.18)
MCHC RBC-ENTMCNC: 27.3 PG — SIGNIFICANT CHANGE UP (ref 27–34)
MCHC RBC-ENTMCNC: 29.8 % — LOW (ref 32–36)
MCV RBC AUTO: 91.4 FL — SIGNIFICANT CHANGE UP (ref 80–100)
PLATELET # BLD AUTO: 161 K/UL — SIGNIFICANT CHANGE UP (ref 150–400)
PMV BLD: 11.1 FL — SIGNIFICANT CHANGE UP (ref 7–13)
POTASSIUM SERPL-MCNC: 3.8 MMOL/L — SIGNIFICANT CHANGE UP (ref 3.5–5.3)
POTASSIUM SERPL-SCNC: 3.8 MMOL/L — SIGNIFICANT CHANGE UP (ref 3.5–5.3)
PROTHROM AB SERPL-ACNC: 29.8 SEC — HIGH (ref 10–13.1)
RBC # BLD: 3.74 M/UL — LOW (ref 4.2–5.8)
RBC # FLD: 19.4 % — HIGH (ref 10.3–14.5)
SODIUM SERPL-SCNC: 133 MMOL/L — LOW (ref 135–145)
WBC # BLD: 4.49 K/UL — SIGNIFICANT CHANGE UP (ref 3.8–10.5)
WBC # FLD AUTO: 4.49 K/UL — SIGNIFICANT CHANGE UP (ref 3.8–10.5)

## 2017-02-06 PROCEDURE — 99232 SBSQ HOSP IP/OBS MODERATE 35: CPT

## 2017-02-06 RX ORDER — WARFARIN SODIUM 2.5 MG/1
3 TABLET ORAL ONCE
Qty: 0 | Refills: 0 | Status: COMPLETED | OUTPATIENT
Start: 2017-02-06 | End: 2017-02-06

## 2017-02-06 RX ADMIN — MIDODRINE HYDROCHLORIDE 30 MILLIGRAM(S): 2.5 TABLET ORAL at 13:30

## 2017-02-06 RX ADMIN — MIDODRINE HYDROCHLORIDE 30 MILLIGRAM(S): 2.5 TABLET ORAL at 07:50

## 2017-02-06 RX ADMIN — AMIODARONE HYDROCHLORIDE 200 MILLIGRAM(S): 400 TABLET ORAL at 05:18

## 2017-02-06 RX ADMIN — Medication 4.5 MICROGRAM(S)/KG/MIN: at 07:49

## 2017-02-06 RX ADMIN — SEVELAMER CARBONATE 800 MILLIGRAM(S): 2400 POWDER, FOR SUSPENSION ORAL at 17:53

## 2017-02-06 RX ADMIN — MIDODRINE HYDROCHLORIDE 30 MILLIGRAM(S): 2.5 TABLET ORAL at 21:34

## 2017-02-06 RX ADMIN — SEVELAMER CARBONATE 800 MILLIGRAM(S): 2400 POWDER, FOR SUSPENSION ORAL at 07:50

## 2017-02-06 RX ADMIN — Medication 4.5 MICROGRAM(S)/KG/MIN: at 18:52

## 2017-02-06 RX ADMIN — SEVELAMER CARBONATE 800 MILLIGRAM(S): 2400 POWDER, FOR SUSPENSION ORAL at 13:31

## 2017-02-06 RX ADMIN — INSULIN GLARGINE 4 UNIT(S): 100 INJECTION, SOLUTION SUBCUTANEOUS at 21:34

## 2017-02-06 RX ADMIN — Medication 75 MICROGRAM(S): at 05:18

## 2017-02-06 RX ADMIN — CLOPIDOGREL BISULFATE 75 MILLIGRAM(S): 75 TABLET, FILM COATED ORAL at 13:30

## 2017-02-06 RX ADMIN — ATORVASTATIN CALCIUM 20 MILLIGRAM(S): 80 TABLET, FILM COATED ORAL at 21:34

## 2017-02-06 RX ADMIN — Medication 30 MILLIGRAM(S): at 13:31

## 2017-02-06 RX ADMIN — WARFARIN SODIUM 3 MILLIGRAM(S): 2.5 TABLET ORAL at 17:53

## 2017-02-07 VITALS
OXYGEN SATURATION: 100 % | TEMPERATURE: 98 F | HEART RATE: 67 BPM | SYSTOLIC BLOOD PRESSURE: 116 MMHG | DIASTOLIC BLOOD PRESSURE: 64 MMHG | RESPIRATION RATE: 18 BRPM

## 2017-02-07 LAB
APTT BLD: 51 SEC — HIGH (ref 27.5–37.4)
BUN SERPL-MCNC: 45 MG/DL — HIGH (ref 7–23)
CALCIUM SERPL-MCNC: 9.4 MG/DL — SIGNIFICANT CHANGE UP (ref 8.4–10.5)
CHLORIDE SERPL-SCNC: 91 MMOL/L — LOW (ref 98–107)
CO2 SERPL-SCNC: 25 MMOL/L — SIGNIFICANT CHANGE UP (ref 22–31)
CREAT SERPL-MCNC: 6.62 MG/DL — HIGH (ref 0.5–1.3)
GLUCOSE SERPL-MCNC: 115 MG/DL — HIGH (ref 70–99)
HCT VFR BLD CALC: 36.3 % — LOW (ref 39–50)
HGB BLD-MCNC: 11 G/DL — LOW (ref 13–17)
INR BLD: 2.85 — HIGH (ref 0.87–1.18)
MAGNESIUM SERPL-MCNC: 2.4 MG/DL — SIGNIFICANT CHANGE UP (ref 1.6–2.6)
MCHC RBC-ENTMCNC: 27.4 PG — SIGNIFICANT CHANGE UP (ref 27–34)
MCHC RBC-ENTMCNC: 30.3 % — LOW (ref 32–36)
MCV RBC AUTO: 90.3 FL — SIGNIFICANT CHANGE UP (ref 80–100)
PLATELET # BLD AUTO: 161 K/UL — SIGNIFICANT CHANGE UP (ref 150–400)
PMV BLD: 11.2 FL — SIGNIFICANT CHANGE UP (ref 7–13)
POTASSIUM SERPL-MCNC: 3.8 MMOL/L — SIGNIFICANT CHANGE UP (ref 3.5–5.3)
POTASSIUM SERPL-SCNC: 3.8 MMOL/L — SIGNIFICANT CHANGE UP (ref 3.5–5.3)
PROTHROM AB SERPL-ACNC: 32.8 SEC — HIGH (ref 10–13.1)
RBC # BLD: 4.02 M/UL — LOW (ref 4.2–5.8)
RBC # FLD: 19 % — HIGH (ref 10.3–14.5)
SODIUM SERPL-SCNC: 132 MMOL/L — LOW (ref 135–145)
WBC # BLD: 5.15 K/UL — SIGNIFICANT CHANGE UP (ref 3.8–10.5)
WBC # FLD AUTO: 5.15 K/UL — SIGNIFICANT CHANGE UP (ref 3.8–10.5)

## 2017-02-07 RX ORDER — DOBUTAMINE HCL 250MG/20ML
3.01 VIAL (ML) INTRAVENOUS
Qty: 0 | Refills: 0 | COMMUNITY
Start: 2017-02-07

## 2017-02-07 RX ORDER — MIDODRINE HYDROCHLORIDE 2.5 MG/1
1 TABLET ORAL
Qty: 0 | Refills: 0 | COMMUNITY

## 2017-02-07 RX ORDER — MIDODRINE HYDROCHLORIDE 2.5 MG/1
3 TABLET ORAL
Qty: 270 | Refills: 0 | OUTPATIENT
Start: 2017-02-07 | End: 2017-03-09

## 2017-02-07 RX ADMIN — MIDODRINE HYDROCHLORIDE 30 MILLIGRAM(S): 2.5 TABLET ORAL at 13:10

## 2017-02-07 RX ADMIN — Medication 30 MILLIGRAM(S): at 14:08

## 2017-02-07 RX ADMIN — SEVELAMER CARBONATE 800 MILLIGRAM(S): 2400 POWDER, FOR SUSPENSION ORAL at 08:52

## 2017-02-07 RX ADMIN — CLOPIDOGREL BISULFATE 75 MILLIGRAM(S): 75 TABLET, FILM COATED ORAL at 14:07

## 2017-02-07 RX ADMIN — FINASTERIDE 5 MILLIGRAM(S): 5 TABLET, FILM COATED ORAL at 14:07

## 2017-02-07 RX ADMIN — SEVELAMER CARBONATE 800 MILLIGRAM(S): 2400 POWDER, FOR SUSPENSION ORAL at 14:08

## 2017-02-07 RX ADMIN — Medication 75 MICROGRAM(S): at 05:35

## 2017-02-07 RX ADMIN — MIDODRINE HYDROCHLORIDE 30 MILLIGRAM(S): 2.5 TABLET ORAL at 05:35

## 2017-02-07 RX ADMIN — AMIODARONE HYDROCHLORIDE 200 MILLIGRAM(S): 400 TABLET ORAL at 05:35

## 2017-02-07 RX ADMIN — Medication 4.5 MICROGRAM(S)/KG/MIN: at 08:12

## 2017-02-07 NOTE — DISCHARGE NOTE ADULT - PATIENT PORTAL LINK FT
“You can access the [facility] Patient Portal, offered by [facility], by registering with the following website: [email or web address]”

## 2017-02-07 NOTE — DISCHARGE NOTE ADULT - CARE PROVIDER_API CALL
Christopher Barrios), Internal Medicine; Nephrology  [address]  [place] [address]  Phone: [phone]  Fax: [phone]

## 2017-02-07 NOTE — DISCHARGE NOTE ADULT - SECONDARY DIAGNOSIS.
Influenza Diabetes mellitus Dyslipidemia BPH (benign prostatic hypertrophy) Chronic renal insufficiency

## 2017-02-07 NOTE — DISCHARGE NOTE ADULT - MEDICATION SUMMARY - MEDICATIONS TO STOP TAKING
I will STOP taking the medications listed below when I get home from the hospital:    Diltia  mg/24 hours oral capsule, extended release  -- 1 cap(s) by mouth once a day

## 2017-02-07 NOTE — DISCHARGE NOTE ADULT - MEDICATION SUMMARY - MEDICATIONS TO TAKE
I will START or STAY ON the medications listed below when I get home from the hospital:    finasteride 5 mg oral tablet  -- 1 tab(s) by mouth once a day  -- Indication: For BPH (benign prostatic hypertrophy)    amiodarone 200 mg oral tablet  -- 1 tab(s) by mouth once a day  -- Indication: For Atrial fibrillation    Coumadin 3 mg oral tablet  -- 1 tab(s) by mouth once a day  -- Indication: For Atrial fibrillation    NovoLIN 70/30 subcutaneous suspension  -- 25 unit(s) subcutaneous once a day  -- Indication: For Diabetes mellitus    Lantus 100 units/mL subcutaneous solution  -- 4 unit(s) subcutaneous once a day (at bedtime)  -- Indication: For Diabetes mellitus    Lipitor 20 mg oral tablet  -- 1 tab(s) by mouth once a day  -- Indication: For Dyslipidemia    Plavix 75 mg oral tablet  -- 1 tab(s) by mouth once a day  -- Indication: For Atrial fibrillation    DOBUTamine  -- 3.006 microgram(s) intravenous IV continuous  -- Indication: For Chronic systolic (congestive) heart failure    midodrine 10 mg oral tablet  -- 3 tab(s) by mouth every 8 hours  -- Indication: For Chronic systolic (congestive) heart failure    Renvela 800 mg oral tablet  -- 1 tab(s) by mouth 3 times a day (with meals)  -- Indication: For ESRD (end stage renal disease) on dialysis    Synthroid 75 mcg (0.075 mg) oral tablet  -- 1 tab(s) by mouth once a day  -- Indication: For Hypothryroid

## 2017-02-07 NOTE — DISCHARGE NOTE ADULT - HOSPITAL COURSE
60 y/o M with PMH of Afib(on Coumadin), Severe CHF(on Dobutamine drip and Midodrine for chronic low BP and on chronic Home O2 at 2L), ESRD(on HD thru right chest wall shiley on Mon,Tues,Thurs, Sat), HTN, HLD, Gout, BPH presented with fever, cough and weakness    +Influenza positive-On Tamiflu  +Severe decompensated HF-On Midodrine for BP control and Dobutamine drip (home medications)  EKG: NSR at a rate of 79 with left axis deviation with TWI in lead AVL with Qtc of 481  H&H: 10.6/35.6  RVP: Flu Positive   Na: 129  BUN/Cr: 30/5.14  ProBnp -   Gluc: 215  CXR: CHF and/or pneumonia.  , 206  Trop  0.24, 0.32  ICU: Increase Midodrine to 30mg Q8hrs, ABX, Check RVP. Not a MICU candidate   2/7 PT stable for discharge home

## 2017-02-07 NOTE — DISCHARGE NOTE ADULT - COMMUNITY RESOURCES
[facility]  [id number] [place] [address] [id number] [phone]

## 2017-02-07 NOTE — DISCHARGE NOTE ADULT - CARE PLAN
Principal Discharge DX:	CHF (congestive heart failure)  Goal:	prevent future excerbation  Instructions for follow-up, activity and diet:	FU PMD, cont current meds ,diet ,daily weights  Secondary Diagnosis:	Influenza  Secondary Diagnosis:	Diabetes mellitus  Instructions for follow-up, activity and diet:	cont diet ,medications and FS  Secondary Diagnosis:	Dyslipidemia  Instructions for follow-up, activity and diet:	cont diet and medications  Secondary Diagnosis:	BPH (benign prostatic hypertrophy)  Instructions for follow-up, activity and diet:	cont medications  Secondary Diagnosis:	Chronic renal insufficiency

## 2017-02-07 NOTE — DISCHARGE NOTE ADULT - PLAN OF CARE
prevent future excerbation FU PMD, cont current meds ,diet ,daily weights cont diet ,medications and FS cont diet and medications cont medications

## 2017-02-07 NOTE — DISCHARGE NOTE ADULT - CONDITIONS AT DISCHARGE
Patient remained hemodynamically stable. Patient denies chest pain, shortness of breath and palpitations. Patient shows no signs of distress.

## 2017-02-07 NOTE — DISCHARGE NOTE ADULT - MEDICATION SUMMARY - MEDICATIONS TO CHANGE
I will SWITCH the dose or number of times a day I take the medications listed below when I get home from the hospital:    midodrine 10 mg oral tablet  -- 1 tab(s) by mouth 3 times a day

## 2017-02-08 LAB
BACTERIA BLD CULT: SIGNIFICANT CHANGE UP
BACTERIA BLD CULT: SIGNIFICANT CHANGE UP

## 2017-03-16 ENCOUNTER — INPATIENT (INPATIENT)
Facility: HOSPITAL | Age: 62
LOS: 0 days | Discharge: HOME HEALTH | End: 2017-03-17
Attending: INTERNAL MEDICINE | Admitting: INTERNAL MEDICINE
Payer: COMMERCIAL

## 2017-03-16 VITALS
HEART RATE: 67 BPM | TEMPERATURE: 98 F | RESPIRATION RATE: 18 BRPM | OXYGEN SATURATION: 99 % | DIASTOLIC BLOOD PRESSURE: 86 MMHG | SYSTOLIC BLOOD PRESSURE: 122 MMHG

## 2017-03-16 DIAGNOSIS — E78.5 HYPERLIPIDEMIA, UNSPECIFIED: ICD-10-CM

## 2017-03-16 DIAGNOSIS — N40.0 BENIGN PROSTATIC HYPERPLASIA WITHOUT LOWER URINARY TRACT SYMPTOMS: ICD-10-CM

## 2017-03-16 DIAGNOSIS — E11.9 TYPE 2 DIABETES MELLITUS WITHOUT COMPLICATIONS: ICD-10-CM

## 2017-03-16 DIAGNOSIS — I21.3 ST ELEVATION (STEMI) MYOCARDIAL INFARCTION OF UNSPECIFIED SITE: ICD-10-CM

## 2017-03-16 DIAGNOSIS — I50.42 CHRONIC COMBINED SYSTOLIC (CONGESTIVE) AND DIASTOLIC (CONGESTIVE) HEART FAILURE: ICD-10-CM

## 2017-03-16 DIAGNOSIS — N18.9 CHRONIC KIDNEY DISEASE, UNSPECIFIED: ICD-10-CM

## 2017-03-16 DIAGNOSIS — I50.9 HEART FAILURE, UNSPECIFIED: ICD-10-CM

## 2017-03-16 DIAGNOSIS — I48.91 UNSPECIFIED ATRIAL FIBRILLATION: ICD-10-CM

## 2017-03-16 LAB
ALBUMIN SERPL ELPH-MCNC: 2.5 G/DL — LOW (ref 3.3–5)
ALBUMIN SERPL ELPH-MCNC: 2.6 G/DL — LOW (ref 3.3–5)
ALP SERPL-CCNC: 175 U/L — HIGH (ref 40–120)
ALP SERPL-CCNC: 179 U/L — HIGH (ref 40–120)
ALT FLD-CCNC: 7 U/L — SIGNIFICANT CHANGE UP (ref 4–41)
ALT FLD-CCNC: 9 U/L — SIGNIFICANT CHANGE UP (ref 4–41)
APTT BLD: 62.8 SEC — HIGH (ref 27.5–37.4)
AST SERPL-CCNC: 14 U/L — SIGNIFICANT CHANGE UP (ref 4–40)
AST SERPL-CCNC: 16 U/L — SIGNIFICANT CHANGE UP (ref 4–40)
BASE EXCESS BLDV CALC-SCNC: 3.8 MMOL/L — SIGNIFICANT CHANGE UP
BASOPHILS # BLD AUTO: 0.01 K/UL — SIGNIFICANT CHANGE UP (ref 0–0.2)
BASOPHILS NFR BLD AUTO: 0.2 % — SIGNIFICANT CHANGE UP (ref 0–2)
BILIRUB SERPL-MCNC: 1.1 MG/DL — SIGNIFICANT CHANGE UP (ref 0.2–1.2)
BILIRUB SERPL-MCNC: 1.1 MG/DL — SIGNIFICANT CHANGE UP (ref 0.2–1.2)
BLOOD GAS VENOUS - CREATININE: SIGNIFICANT CHANGE UP MG/DL (ref 0.5–1.3)
BUN SERPL-MCNC: 16 MG/DL — SIGNIFICANT CHANGE UP (ref 7–23)
BUN SERPL-MCNC: 17 MG/DL — SIGNIFICANT CHANGE UP (ref 7–23)
CALCIUM SERPL-MCNC: 8.7 MG/DL — SIGNIFICANT CHANGE UP (ref 8.4–10.5)
CALCIUM SERPL-MCNC: 8.8 MG/DL — SIGNIFICANT CHANGE UP (ref 8.4–10.5)
CHLORIDE BLDV-SCNC: 106 MMOL/L — SIGNIFICANT CHANGE UP (ref 96–108)
CHLORIDE SERPL-SCNC: 100 MMOL/L — SIGNIFICANT CHANGE UP (ref 98–107)
CHLORIDE SERPL-SCNC: 101 MMOL/L — SIGNIFICANT CHANGE UP (ref 98–107)
CHOLEST SERPL-MCNC: 58 MG/DL — LOW (ref 120–199)
CK MB BLD-MCNC: 2.68 NG/ML — SIGNIFICANT CHANGE UP (ref 1–6.6)
CK MB BLD-MCNC: 3.14 NG/ML — SIGNIFICANT CHANGE UP (ref 1–6.6)
CK MB BLD-MCNC: SIGNIFICANT CHANGE UP (ref 0–2.5)
CK SERPL-CCNC: 46 U/L — SIGNIFICANT CHANGE UP (ref 30–200)
CK SERPL-CCNC: 53 U/L — SIGNIFICANT CHANGE UP (ref 30–200)
CO2 SERPL-SCNC: 26 MMOL/L — SIGNIFICANT CHANGE UP (ref 22–31)
CO2 SERPL-SCNC: 29 MMOL/L — SIGNIFICANT CHANGE UP (ref 22–31)
CREAT SERPL-MCNC: 3.2 MG/DL — HIGH (ref 0.5–1.3)
CREAT SERPL-MCNC: 3.58 MG/DL — HIGH (ref 0.5–1.3)
EOSINOPHIL # BLD AUTO: 0.11 K/UL — SIGNIFICANT CHANGE UP (ref 0–0.5)
EOSINOPHIL NFR BLD AUTO: 2.1 % — SIGNIFICANT CHANGE UP (ref 0–6)
GAS PNL BLDV: 134 MMOL/L — LOW (ref 136–146)
GLUCOSE BLDV-MCNC: 208 — HIGH (ref 70–99)
GLUCOSE SERPL-MCNC: 154 MG/DL — HIGH (ref 70–99)
GLUCOSE SERPL-MCNC: 251 MG/DL — HIGH (ref 70–99)
HBA1C BLD-MCNC: 7 % — HIGH (ref 4–5.6)
HCO3 BLDV-SCNC: 26 MMOL/L — SIGNIFICANT CHANGE UP (ref 20–27)
HCT VFR BLD CALC: 36 % — LOW (ref 39–50)
HCT VFR BLD CALC: 36.1 % — LOW (ref 39–50)
HCT VFR BLDV CALC: 36.7 % — LOW (ref 39–51)
HDLC SERPL-MCNC: 27 MG/DL — LOW (ref 35–55)
HGB BLD-MCNC: 10.7 G/DL — LOW (ref 13–17)
HGB BLD-MCNC: 10.8 G/DL — LOW (ref 13–17)
HGB BLDV-MCNC: 11.9 G/DL — LOW (ref 13–17)
IMM GRANULOCYTES NFR BLD AUTO: 0.4 % — SIGNIFICANT CHANGE UP (ref 0–1.5)
INR BLD: 4.67 — HIGH (ref 0.87–1.18)
LACTATE BLDV-MCNC: 3.9 MMOL/L — HIGH (ref 0.5–2)
LACTATE SERPL-SCNC: 1.6 MMOL/L — SIGNIFICANT CHANGE UP (ref 0.5–2)
LIDOCAIN IGE QN: 18.8 U/L — SIGNIFICANT CHANGE UP (ref 7–60)
LIPID PNL WITH DIRECT LDL SERPL: 17 MG/DL — SIGNIFICANT CHANGE UP
LYMPHOCYTES # BLD AUTO: 0.66 K/UL — LOW (ref 1–3.3)
LYMPHOCYTES # BLD AUTO: 12.7 % — LOW (ref 13–44)
MAGNESIUM SERPL-MCNC: 2 MG/DL — SIGNIFICANT CHANGE UP (ref 1.6–2.6)
MCHC RBC-ENTMCNC: 28.3 PG — SIGNIFICANT CHANGE UP (ref 27–34)
MCHC RBC-ENTMCNC: 28.3 PG — SIGNIFICANT CHANGE UP (ref 27–34)
MCHC RBC-ENTMCNC: 29.6 % — LOW (ref 32–36)
MCHC RBC-ENTMCNC: 30 % — LOW (ref 32–36)
MCV RBC AUTO: 94.5 FL — SIGNIFICANT CHANGE UP (ref 80–100)
MCV RBC AUTO: 95.5 FL — SIGNIFICANT CHANGE UP (ref 80–100)
MONOCYTES # BLD AUTO: 0.84 K/UL — SIGNIFICANT CHANGE UP (ref 0–0.9)
MONOCYTES NFR BLD AUTO: 16.1 % — HIGH (ref 2–14)
NEUTROPHILS # BLD AUTO: 3.57 K/UL — SIGNIFICANT CHANGE UP (ref 1.8–7.4)
NEUTROPHILS NFR BLD AUTO: 68.5 % — SIGNIFICANT CHANGE UP (ref 43–77)
NT-PROBNP SERPL-SCNC: SIGNIFICANT CHANGE UP PG/ML
PCO2 BLDV: 51 MMHG — SIGNIFICANT CHANGE UP (ref 41–51)
PH BLDV: 7.37 PH — SIGNIFICANT CHANGE UP (ref 7.32–7.43)
PHOSPHATE SERPL-MCNC: 2.5 MG/DL — SIGNIFICANT CHANGE UP (ref 2.5–4.5)
PLATELET # BLD AUTO: 143 K/UL — LOW (ref 150–400)
PLATELET # BLD AUTO: 147 K/UL — LOW (ref 150–400)
PMV BLD: 12 FL — SIGNIFICANT CHANGE UP (ref 7–13)
PMV BLD: 12.2 FL — SIGNIFICANT CHANGE UP (ref 7–13)
PO2 BLDV: 27 MMHG — LOW (ref 35–40)
POTASSIUM BLDV-SCNC: 4.6 MMOL/L — HIGH (ref 3.4–4.5)
POTASSIUM SERPL-MCNC: 3.6 MMOL/L — SIGNIFICANT CHANGE UP (ref 3.5–5.3)
POTASSIUM SERPL-MCNC: 3.8 MMOL/L — SIGNIFICANT CHANGE UP (ref 3.5–5.3)
POTASSIUM SERPL-SCNC: 3.6 MMOL/L — SIGNIFICANT CHANGE UP (ref 3.5–5.3)
POTASSIUM SERPL-SCNC: 3.8 MMOL/L — SIGNIFICANT CHANGE UP (ref 3.5–5.3)
PROT SERPL-MCNC: 7.5 G/DL — SIGNIFICANT CHANGE UP (ref 6–8.3)
PROT SERPL-MCNC: 7.6 G/DL — SIGNIFICANT CHANGE UP (ref 6–8.3)
PROTHROM AB SERPL-ACNC: 54.1 SEC — HIGH (ref 10–13.1)
RBC # BLD: 3.78 M/UL — LOW (ref 4.2–5.8)
RBC # BLD: 3.81 M/UL — LOW (ref 4.2–5.8)
RBC # FLD: 18.6 % — HIGH (ref 10.3–14.5)
RBC # FLD: 18.7 % — HIGH (ref 10.3–14.5)
SAO2 % BLDV: 38.8 % — LOW (ref 60–85)
SODIUM SERPL-SCNC: 140 MMOL/L — SIGNIFICANT CHANGE UP (ref 135–145)
SODIUM SERPL-SCNC: 140 MMOL/L — SIGNIFICANT CHANGE UP (ref 135–145)
TRIGL SERPL-MCNC: 56 MG/DL — SIGNIFICANT CHANGE UP (ref 10–149)
TROPONIN T SERPL-MCNC: 0.23 NG/ML — HIGH (ref 0–0.06)
TROPONIN T SERPL-MCNC: 0.23 NG/ML — HIGH (ref 0–0.06)
WBC # BLD: 5.21 K/UL — SIGNIFICANT CHANGE UP (ref 3.8–10.5)
WBC # BLD: 5.84 K/UL — SIGNIFICANT CHANGE UP (ref 3.8–10.5)
WBC # FLD AUTO: 5.21 K/UL — SIGNIFICANT CHANGE UP (ref 3.8–10.5)
WBC # FLD AUTO: 5.84 K/UL — SIGNIFICANT CHANGE UP (ref 3.8–10.5)

## 2017-03-16 PROCEDURE — 93010 ELECTROCARDIOGRAM REPORT: CPT

## 2017-03-16 PROCEDURE — 71010: CPT | Mod: 26

## 2017-03-16 RX ORDER — DEXTROSE 50 % IN WATER 50 %
25 SYRINGE (ML) INTRAVENOUS ONCE
Qty: 0 | Refills: 0 | Status: DISCONTINUED | OUTPATIENT
Start: 2017-03-16 | End: 2017-03-17

## 2017-03-16 RX ORDER — DOBUTAMINE HCL 250MG/20ML
3 VIAL (ML) INTRAVENOUS
Qty: 500 | Refills: 0 | Status: DISCONTINUED | OUTPATIENT
Start: 2017-03-16 | End: 2017-03-17

## 2017-03-16 RX ORDER — DEXTROSE 50 % IN WATER 50 %
1 SYRINGE (ML) INTRAVENOUS ONCE
Qty: 0 | Refills: 0 | Status: DISCONTINUED | OUTPATIENT
Start: 2017-03-16 | End: 2017-03-17

## 2017-03-16 RX ORDER — SODIUM CHLORIDE 9 MG/ML
500 INJECTION INTRAMUSCULAR; INTRAVENOUS; SUBCUTANEOUS
Qty: 0 | Refills: 0 | Status: DISCONTINUED | OUTPATIENT
Start: 2017-03-16 | End: 2017-03-17

## 2017-03-16 RX ORDER — ATORVASTATIN CALCIUM 80 MG/1
20 TABLET, FILM COATED ORAL AT BEDTIME
Qty: 0 | Refills: 0 | Status: DISCONTINUED | OUTPATIENT
Start: 2017-03-16 | End: 2017-03-17

## 2017-03-16 RX ORDER — INSULIN LISPRO 100/ML
VIAL (ML) SUBCUTANEOUS AT BEDTIME
Qty: 0 | Refills: 0 | Status: DISCONTINUED | OUTPATIENT
Start: 2017-03-16 | End: 2017-03-17

## 2017-03-16 RX ORDER — LEVOTHYROXINE SODIUM 125 MCG
75 TABLET ORAL DAILY
Qty: 0 | Refills: 0 | Status: DISCONTINUED | OUTPATIENT
Start: 2017-03-16 | End: 2017-03-17

## 2017-03-16 RX ORDER — SEVELAMER CARBONATE 2400 MG/1
800 POWDER, FOR SUSPENSION ORAL
Qty: 0 | Refills: 0 | Status: DISCONTINUED | OUTPATIENT
Start: 2017-03-16 | End: 2017-03-17

## 2017-03-16 RX ORDER — AMIODARONE HYDROCHLORIDE 400 MG/1
200 TABLET ORAL DAILY
Qty: 0 | Refills: 0 | Status: DISCONTINUED | OUTPATIENT
Start: 2017-03-16 | End: 2017-03-17

## 2017-03-16 RX ORDER — SODIUM CHLORIDE 9 MG/ML
1000 INJECTION, SOLUTION INTRAVENOUS
Qty: 0 | Refills: 0 | Status: DISCONTINUED | OUTPATIENT
Start: 2017-03-16 | End: 2017-03-17

## 2017-03-16 RX ORDER — INSULIN LISPRO 100/ML
VIAL (ML) SUBCUTANEOUS
Qty: 0 | Refills: 0 | Status: DISCONTINUED | OUTPATIENT
Start: 2017-03-16 | End: 2017-03-17

## 2017-03-16 RX ORDER — MIDODRINE HYDROCHLORIDE 2.5 MG/1
20 TABLET ORAL ONCE
Qty: 0 | Refills: 0 | Status: COMPLETED | OUTPATIENT
Start: 2017-03-16 | End: 2017-03-16

## 2017-03-16 RX ORDER — GLUCAGON INJECTION, SOLUTION 0.5 MG/.1ML
1 INJECTION, SOLUTION SUBCUTANEOUS ONCE
Qty: 0 | Refills: 0 | Status: DISCONTINUED | OUTPATIENT
Start: 2017-03-16 | End: 2017-03-17

## 2017-03-16 RX ORDER — CLOPIDOGREL BISULFATE 75 MG/1
75 TABLET, FILM COATED ORAL DAILY
Qty: 0 | Refills: 0 | Status: DISCONTINUED | OUTPATIENT
Start: 2017-03-16 | End: 2017-03-17

## 2017-03-16 RX ORDER — DEXTROSE 50 % IN WATER 50 %
12.5 SYRINGE (ML) INTRAVENOUS ONCE
Qty: 0 | Refills: 0 | Status: DISCONTINUED | OUTPATIENT
Start: 2017-03-16 | End: 2017-03-17

## 2017-03-16 RX ORDER — SODIUM CHLORIDE 9 MG/ML
250 INJECTION INTRAMUSCULAR; INTRAVENOUS; SUBCUTANEOUS ONCE
Qty: 0 | Refills: 0 | Status: COMPLETED | OUTPATIENT
Start: 2017-03-16 | End: 2017-03-16

## 2017-03-16 RX ORDER — INSULIN GLARGINE 100 [IU]/ML
4 INJECTION, SOLUTION SUBCUTANEOUS AT BEDTIME
Qty: 0 | Refills: 0 | Status: DISCONTINUED | OUTPATIENT
Start: 2017-03-16 | End: 2017-03-17

## 2017-03-16 RX ORDER — FINASTERIDE 5 MG/1
5 TABLET, FILM COATED ORAL DAILY
Qty: 0 | Refills: 0 | Status: DISCONTINUED | OUTPATIENT
Start: 2017-03-16 | End: 2017-03-17

## 2017-03-16 RX ORDER — MIDODRINE HYDROCHLORIDE 2.5 MG/1
10 TABLET ORAL EVERY 8 HOURS
Qty: 0 | Refills: 0 | Status: DISCONTINUED | OUTPATIENT
Start: 2017-03-16 | End: 2017-03-17

## 2017-03-16 RX ADMIN — SODIUM CHLORIDE 125 MILLILITER(S): 9 INJECTION INTRAMUSCULAR; INTRAVENOUS; SUBCUTANEOUS at 18:19

## 2017-03-16 RX ADMIN — SODIUM CHLORIDE 250 MILLILITER(S): 9 INJECTION INTRAMUSCULAR; INTRAVENOUS; SUBCUTANEOUS at 12:49

## 2017-03-16 RX ADMIN — ATORVASTATIN CALCIUM 20 MILLIGRAM(S): 80 TABLET, FILM COATED ORAL at 21:31

## 2017-03-16 RX ADMIN — MIDODRINE HYDROCHLORIDE 20 MILLIGRAM(S): 2.5 TABLET ORAL at 14:20

## 2017-03-16 RX ADMIN — MIDODRINE HYDROCHLORIDE 10 MILLIGRAM(S): 2.5 TABLET ORAL at 21:31

## 2017-03-16 RX ADMIN — Medication 7.28 MICROGRAM(S)/KG/MIN: at 21:30

## 2017-03-16 NOTE — ED ADULT NURSE NOTE - OBJECTIVE STATEMENT
Pt received to rm 22, a&ox3, c/o sob after dialysis this morning. Pt on chronic dopamine drip "for his heart." R ACW port with dialysis cath in place with dressing intact. Pt received on NRB mask placed by self, switched to NC3L by MD Monsivais by bedside for usual home dose of NC3L, O2 sat 94-95%, vs as noted. Pt states baseline BP in 90s and 80s systolic, BP as noted upon arrival. IVF infusing slowly as ordered. Dialysis t-th-sat. Labs drawn and sent, IVL placed. Pt denies any chest pain - pt refusing pain meds, states pain is comfortable. Sinus rhythm on cm. Family remains by bedside. Pt received to rm 22, a&ox3, c/o sob after dialysis this morning. Pt on chronic dopamine drip "for his heart." R ACW port with dialysis cath in place with dressing intact. Pt received on NRB mask placed by self, switched to NC3L by MD Monsivais by bedside for usual home dose of NC3L, O2 sat 94-95%, vs as noted. Pt states baseline BP in 90s and 80s systolic, BP as noted upon arrival. IVF infusing slowly as ordered. Dialysis t-th-sat. IVL placed, unable to obtain labs - MD by bedside and aware. Pt denies any chest pain - pt refusing pain meds for c/o abdominal pain, states pain is tolerable at present. Sinus rhythm on cm. Family remains by bedside. Pt received to rm 22, a&ox3, c/o sob after dialysis this morning. Pt on chronic dopamine drip "for his heart." R ACW port with dialysis cath in place with dressing intact. Pt received on NRB mask placed by self, switched to NC3L by MD Monsivais by bedside for usual home dose of NC3L, O2 sat 94-95%, vs as noted. Pt states baseline BP in 90s and 80s systolic, BP as noted upon arrival. IVF infusing slowly as ordered. Dialysis t-th-sat. IVL placed, unable to obtain labs - MD by bedside and aware. Pt denies any chest pain - pt refusing pain meds for c/o abdominal pain, states pain is tolerable at present. Afib with pvcs on cm - MD aware. Family remains by bedside.

## 2017-03-16 NOTE — ED PROVIDER NOTE - PROGRESS NOTE DETAILS
discussed with Dr Davis, says normally BP in the 90s systolic, rec to admit to hospitalist and will see patient in hospital, labs pending, BP currently in the 90s systolic Discussed with hospitalist, does not feel comfortable taking critical cardiac patient on the floor, relayed to Dr Davis, will refer to house cards as patient unsuitable for floor ED Attending: Sukhdev  Pt signed out to me from Dr. Monsivais.  Dr. Davis and CCU NP at bedside.  Dr. Davis, who knows pt well, states he feels this patient is relatively dry and would benefit from additional 250 ml IVNS and otherwise c/w Dobutamine. Accepted to CCU under Dr. Nieves.

## 2017-03-16 NOTE — ED ADULT TRIAGE NOTE - CHIEF COMPLAINT QUOTE
Patient brought to ER from home by EMS. Pt had dialysis today that was completed and when he got home his son stated that he looked weak and short of breath. (Pt is on a dopamine pump).  and O2 sat at home was 72%. Pt is on home O2 99 with NRB. Pt also c/o abdominal pain.

## 2017-03-16 NOTE — H&P ADULT. - PROBLEM SELECTOR PLAN 1
Admit to CCU for hypovolemia in the setting of post hemodialysis.  Administer 250 cc NS IV bolus. Continue dobutamine gtt at 3 mcg/kg/min.  Continue midodrine at 10mg PO TID. Continue plavix 75, lipitor 20.  May use IV central line access for dobutamine infusion. Strict I/O. Daily weights. Dr. Davis notified regarding patient's admission.  He recommended continuing current meds and administering gentle fluids.  -Admission labs to include serum proBNP, TSH, lactic acid level, lipid profile, CMP with mag and phos, CBC and coags, trend cardiac enzymes

## 2017-03-16 NOTE — H&P ADULT. - HISTORY OF PRESENT ILLNESS
Patient is a 61 year old male with a PMH of atrial fibrillation on coumadin, chronic severe systolic heart failure, AICD (Biotrik), on home infusion dobutamine gtt @3mcg/kg/min, on home oxygen, ESRD, on HD T/Th/Sat., DM, HLD, gout, MI in 2011, interstital lung disease, BPH, presents to the ER with feeling lightheaded and weak after completing dialysis today.  Patient endorses feeling short of breath. Denies chest pain, palpitations, nausea, vomitting, or diarrhea. Denies fever and chills. Patient reports that he was feeling fine before dialysis, and has not been sick recently.  Patient received a chest Xray in ER which confirmed placement of a left chest wall AICD, a tunneled right chest wall dual lumen catheter for hemodialysis and a right subclavian central line catheter for dobutamine.  CXR also confirms interstital lung disease with a superimposed edema and small right pleural effusion.  Patient admitted to CCU for further managment of heart failure.

## 2017-03-16 NOTE — ED PROVIDER NOTE - MEDICAL DECISION MAKING DETAILS
61 M with severe chf, esrd, brought in for episode of gen weakness and lightheaded, which has resolved, however BPs still in the 80s systolic. Will hydrate gently, ekg, cxr, labs and liekly admit for observation

## 2017-03-16 NOTE — H&P ADULT. - PROBLEM SELECTOR PLAN 7
FS qac and qhs, continue lantus 4u sq qhs. Initiate Insulin sliding scale. Continue novolin 70/30 25 units. check HGBA1c

## 2017-03-16 NOTE — ED PROVIDER NOTE - ATTENDING CONTRIBUTION TO CARE
Attending note:   After face to face evaluation of this patient, I concur with above noted hx, pe, and care plan for this patient. +62 y/o M with poor EF, DM, CRF on chronic dopamine with weakness after dialysis today with bps in 70's systolic, alert, not complaining.      Evaluation and hydration in progress

## 2017-03-16 NOTE — H&P ADULT. - PROBLEM SELECTOR PLAN 4
Patient dialyzed today.  Will notify patient's nephrologist Dr. Johnson @ [facility] for further HD recommendations. Next HD due for Saturday

## 2017-03-16 NOTE — ED PROVIDER NOTE - OBJECTIVE STATEMENT
61 M 61 M w h/o afic on coumadin, severe chf on dobutamine and midodrine drips for chronic low BPs, chronic home O2  (2L) ESRd on HD t, th, s, came from dialysis today with feeling lightheaded, SOB and generalized weakness. Patient had full dialysis, felt ok, but while in the car home developed symptoms.  No CP, no LOC, no abd pain, nausea vomiting of diarrhea. Per son he is to be on O2 24hours, but may not have been on full 2L while this happened. Currently denying dizziness, CP or SOB.

## 2017-03-17 VITALS
OXYGEN SATURATION: 95 % | RESPIRATION RATE: 19 BRPM | DIASTOLIC BLOOD PRESSURE: 52 MMHG | HEART RATE: 85 BPM | SYSTOLIC BLOOD PRESSURE: 85 MMHG

## 2017-03-17 LAB
ALBUMIN SERPL ELPH-MCNC: 2.4 G/DL — LOW (ref 3.3–5)
ALP SERPL-CCNC: 171 U/L — HIGH (ref 40–120)
ALT FLD-CCNC: 8 U/L — SIGNIFICANT CHANGE UP (ref 4–41)
APTT BLD: 57.6 SEC — HIGH (ref 27.5–37.4)
AST SERPL-CCNC: 17 U/L — SIGNIFICANT CHANGE UP (ref 4–40)
BILIRUB SERPL-MCNC: 1.1 MG/DL — SIGNIFICANT CHANGE UP (ref 0.2–1.2)
BUN SERPL-MCNC: 21 MG/DL — SIGNIFICANT CHANGE UP (ref 7–23)
CALCIUM SERPL-MCNC: 9 MG/DL — SIGNIFICANT CHANGE UP (ref 8.4–10.5)
CHLORIDE SERPL-SCNC: 99 MMOL/L — SIGNIFICANT CHANGE UP (ref 98–107)
CO2 SERPL-SCNC: 26 MMOL/L — SIGNIFICANT CHANGE UP (ref 22–31)
CREAT SERPL-MCNC: 3.9 MG/DL — HIGH (ref 0.5–1.3)
GLUCOSE SERPL-MCNC: 93 MG/DL — SIGNIFICANT CHANGE UP (ref 70–99)
HCT VFR BLD CALC: 36 % — LOW (ref 39–50)
HGB BLD-MCNC: 10.6 G/DL — LOW (ref 13–17)
INR BLD: 4.08 — HIGH (ref 0.87–1.18)
MAGNESIUM SERPL-MCNC: 2.2 MG/DL — SIGNIFICANT CHANGE UP (ref 1.6–2.6)
MCHC RBC-ENTMCNC: 28.1 PG — SIGNIFICANT CHANGE UP (ref 27–34)
MCHC RBC-ENTMCNC: 29.4 % — LOW (ref 32–36)
MCV RBC AUTO: 95.5 FL — SIGNIFICANT CHANGE UP (ref 80–100)
PHOSPHATE SERPL-MCNC: 2.9 MG/DL — SIGNIFICANT CHANGE UP (ref 2.5–4.5)
PLATELET # BLD AUTO: 143 K/UL — LOW (ref 150–400)
PMV BLD: 11.9 FL — SIGNIFICANT CHANGE UP (ref 7–13)
POTASSIUM SERPL-MCNC: 3.9 MMOL/L — SIGNIFICANT CHANGE UP (ref 3.5–5.3)
POTASSIUM SERPL-SCNC: 3.9 MMOL/L — SIGNIFICANT CHANGE UP (ref 3.5–5.3)
PROT SERPL-MCNC: 7.4 G/DL — SIGNIFICANT CHANGE UP (ref 6–8.3)
PROTHROM AB SERPL-ACNC: 47.2 SEC — HIGH (ref 10–13.1)
RBC # BLD: 3.77 M/UL — LOW (ref 4.2–5.8)
RBC # FLD: 18.8 % — HIGH (ref 10.3–14.5)
SODIUM SERPL-SCNC: 137 MMOL/L — SIGNIFICANT CHANGE UP (ref 135–145)
WBC # BLD: 5.72 K/UL — SIGNIFICANT CHANGE UP (ref 3.8–10.5)
WBC # FLD AUTO: 5.72 K/UL — SIGNIFICANT CHANGE UP (ref 3.8–10.5)

## 2017-03-17 PROCEDURE — 93010 ELECTROCARDIOGRAM REPORT: CPT

## 2017-03-17 PROCEDURE — 99239 HOSP IP/OBS DSCHRG MGMT >30: CPT

## 2017-03-17 RX ORDER — CLOPIDOGREL BISULFATE 75 MG/1
1 TABLET, FILM COATED ORAL
Qty: 0 | Refills: 0 | COMMUNITY

## 2017-03-17 RX ORDER — CHLORHEXIDINE GLUCONATE 213 G/1000ML
1 SOLUTION TOPICAL DAILY
Qty: 0 | Refills: 0 | Status: DISCONTINUED | OUTPATIENT
Start: 2017-03-17 | End: 2017-03-17

## 2017-03-17 RX ORDER — WARFARIN SODIUM 2.5 MG/1
1 TABLET ORAL
Qty: 30 | Refills: 0 | OUTPATIENT
Start: 2017-03-17 | End: 2017-04-16

## 2017-03-17 RX ORDER — WARFARIN SODIUM 2.5 MG/1
1 TABLET ORAL
Qty: 0 | Refills: 0 | COMMUNITY

## 2017-03-17 RX ORDER — SODIUM CHLORIDE 9 MG/ML
250 INJECTION INTRAMUSCULAR; INTRAVENOUS; SUBCUTANEOUS ONCE
Qty: 0 | Refills: 0 | Status: COMPLETED | OUTPATIENT
Start: 2017-03-17 | End: 2017-03-17

## 2017-03-17 RX ADMIN — SODIUM CHLORIDE 1000 MILLILITER(S): 9 INJECTION INTRAMUSCULAR; INTRAVENOUS; SUBCUTANEOUS at 10:33

## 2017-03-17 RX ADMIN — CHLORHEXIDINE GLUCONATE 1 APPLICATION(S): 213 SOLUTION TOPICAL at 12:15

## 2017-03-17 RX ADMIN — FINASTERIDE 5 MILLIGRAM(S): 5 TABLET, FILM COATED ORAL at 12:16

## 2017-03-17 RX ADMIN — SEVELAMER CARBONATE 800 MILLIGRAM(S): 2400 POWDER, FOR SUSPENSION ORAL at 09:16

## 2017-03-17 RX ADMIN — AMIODARONE HYDROCHLORIDE 200 MILLIGRAM(S): 400 TABLET ORAL at 05:58

## 2017-03-17 RX ADMIN — Medication 7.28 MICROGRAM(S)/KG/MIN: at 05:58

## 2017-03-17 RX ADMIN — MIDODRINE HYDROCHLORIDE 10 MILLIGRAM(S): 2.5 TABLET ORAL at 14:10

## 2017-03-17 RX ADMIN — SEVELAMER CARBONATE 800 MILLIGRAM(S): 2400 POWDER, FOR SUSPENSION ORAL at 12:16

## 2017-03-17 RX ADMIN — Medication 75 MICROGRAM(S): at 05:58

## 2017-03-17 RX ADMIN — MIDODRINE HYDROCHLORIDE 10 MILLIGRAM(S): 2.5 TABLET ORAL at 05:58

## 2017-03-17 RX ADMIN — Medication 7.28 MICROGRAM(S)/KG/MIN: at 07:14

## 2017-03-17 NOTE — DISCHARGE NOTE ADULT - PLAN OF CARE
Resolved. You probably had too much fluid taken off during dialysis and were feeling dehydrated. We gave you some fluid back. For the next day, you should try to drink more fluids but then after you are feeling better, continue with your low salt and restricted diet. Manage your condition. You should continue with the continuous dobutamine drip and should continue to take all of your medications as prescribed. You should follow up with your cardiologist in 1-2 weeks from leaving the hospital. You should continue to take your amiodarone as prescribed. Additionally, your coumadin level was high when you came in to the hospital. We reduced your coumadin dose to 2 mg daily. You should take the coumadin 2 mg every day and make sure you follow up with your PCP early next week to have your INR checked.

## 2017-03-17 NOTE — DISCHARGE NOTE ADULT - CARE PROVIDERS DIRECT ADDRESSES
,DirectAddress_Unknown,[email or web address]

## 2017-03-17 NOTE — DISCHARGE NOTE ADULT - MEDICATION SUMMARY - MEDICATIONS TO TAKE
I will START or STAY ON the medications listed below when I get home from the hospital:    finasteride 5 mg oral tablet  -- 1 tab(s) by mouth once a day  -- Indication: For BPH (benign prostatic hypertrophy)    amiodarone 200 mg oral tablet  -- 1 tab(s) by mouth once a day  -- Indication: For AF (atrial fibrillation)    Coumadin 2 mg oral tablet  -- 1 tab(s) by mouth once a day  -- Do not take this drug if you are pregnant.  It is very important that you take or use this exactly as directed.  Do not skip doses or discontinue unless directed by your doctor.  Obtain medical advice before taking any non-prescription drugs as some may affect the action of this medication.    -- Indication: For AF (atrial fibrillation)    NovoLIN 70/30 subcutaneous suspension  -- 25 unit(s) subcutaneous once a day  -- Indication: For Diabetes mellitus    Lantus 100 units/mL subcutaneous solution  -- 4 unit(s) subcutaneous once a day (at bedtime)  -- Indication: For Diabetes mellitus    Lipitor 20 mg oral tablet  -- 1 tab(s) by mouth once a day  -- Indication: For Hyperlipidemia    Plavix 75 mg oral tablet  -- 1 tab(s) by mouth once a day  -- Indication: For Coronary Artery Disease    DOBUTamine  -- 3.006 microgram(s) intravenous IV continuous  -- Indication: For Chronic combined systolic and diastolic congestive heart failure    midodrine 10 mg oral tablet  -- 3 tab(s) by mouth every 8 hours  -- Indication: For Chronic combined systolic and diastolic congestive heart failure    Renvela 800 mg oral tablet  -- 1 tab(s) by mouth 3 times a day (with meals)  -- Indication: For Chronic renal insufficiency    Synthroid 75 mcg (0.075 mg) oral tablet  -- 1 tab(s) by mouth once a day  -- Indication: For Hypothyroidism I will START or STAY ON the medications listed below when I get home from the hospital:    finasteride 5 mg oral tablet  -- 1 tab(s) by mouth once a day  -- Indication: For BPH (benign prostatic hypertrophy)    amiodarone 200 mg oral tablet  -- 1 tab(s) by mouth once a day  -- Indication: For AF (atrial fibrillation)    Coumadin 2 mg oral tablet  -- 1 tab(s) by mouth once a day  -- Do not take this drug if you are pregnant.  It is very important that you take or use this exactly as directed.  Do not skip doses or discontinue unless directed by your doctor.  Obtain medical advice before taking any non-prescription drugs as some may affect the action of this medication.    -- Indication: For AF (atrial fibrillation)    NovoLIN 70/30 subcutaneous suspension  -- 25 unit(s) subcutaneous once a day  -- Indication: For Diabetes mellitus    Lantus 100 units/mL subcutaneous solution  -- 4 unit(s) subcutaneous once a day (at bedtime)  -- Indication: For Diabetes mellitus    Lipitor 20 mg oral tablet  -- 1 tab(s) by mouth once a day  -- Indication: For Hyperlipidemia    DOBUTamine  -- 3.006 microgram(s) intravenous IV continuous  -- Indication: For Chronic combined systolic and diastolic congestive heart failure    midodrine 10 mg oral tablet  -- 3 tab(s) by mouth every 8 hours  -- Indication: For Chronic combined systolic and diastolic congestive heart failure    Renvela 800 mg oral tablet  -- 1 tab(s) by mouth 3 times a day (with meals)  -- Indication: For Chronic renal insufficiency    Synthroid 75 mcg (0.075 mg) oral tablet  -- 1 tab(s) by mouth once a day  -- Indication: For Hypothyroidism

## 2017-03-17 NOTE — DISCHARGE NOTE ADULT - PATIENT PORTAL LINK FT
“You can access the Follow[employer] Patient Portal, offered by [facility], by registering with the following website: [email or web address]”

## 2017-03-17 NOTE — DISCHARGE NOTE ADULT - MEDICATION SUMMARY - MEDICATIONS TO CHANGE
I will SWITCH the dose or number of times a day I take the medications listed below when I get home from the hospital:    Coumadin 3 mg oral tablet  -- 1 tab(s) by mouth once a day

## 2017-03-17 NOTE — PHYSICAL THERAPY INITIAL EVALUATION ADULT - ADDITIONAL COMMENTS
Pt reports that he lives in a private house with his wife and son with ~3-4 steps to negotiate to enter the house and a flight of stairs to negotiate to bedroom. Prior to hospital admission pt ambulated using a Rolling Walker/Rollator both independently and with assistance. Pt requires assistance with showering/dressing from wife.    In addition pt uses 2-3L of O2 though Nasal cannula at home    Pt left comfortable in chair, NAD, all lines intact, all precautions maintained, with MARGY Romo and MD Davis @ bedside.

## 2017-03-17 NOTE — DISCHARGE NOTE ADULT - NS AS DC VTE INSTRUCTION
Coumadin/Warfarin - Dietary Advice.../Coumadin/Warfarin - Compliance.../Coumadin/Warfarin - Potential for adverse drug reactions and interactions/Coumadin/Warfarin - Follow-up monitoring...

## 2017-03-17 NOTE — DISCHARGE NOTE ADULT - CARE PROVIDER_API CALL
Thaddeus Davis), Cardiovascular Disease; Internal Medicine  [address] [id number]  Phone: [phone]  Fax: [phone]

## 2017-03-17 NOTE — DISCHARGE NOTE ADULT - HOME CARE AGENCY
[facility] Infusion [facility] [phone] Home Infusion of Dobutamine Drip.

## 2017-03-17 NOTE — PHYSICAL THERAPY INITIAL EVALUATION ADULT - CRITERIA FOR SKILLED THERAPEUTIC INTERVENTIONS
functional limitations in following categories/rehab potential/impairments found/risk reduction/prevention

## 2017-03-17 NOTE — PHYSICAL THERAPY INITIAL EVALUATION ADULT - PATIENT PROFILE REVIEW, REHAB EVAL
ACTIVITY: BEDREST/May dangle @ bedside; spoke with RN Clarissa Freeman prior to PT evaluation--> Pt OK for OOB to chair/yes

## 2017-03-17 NOTE — DISCHARGE NOTE ADULT - HOSPITAL COURSE
61 M w/ hx of atrial fibrillation on coumadin, chronic severe systolic heart failure, AICD (Biotrik), on home infusion dobutamine gtt @3mcg/kg/min, on home oxygen (2-3 L NC), ESRD, on HD T/Th/Sat., DM2, gout, MI in 2011, interstital lung disease, and BPH who presented to the ED with feeling lightheaded and weak after receiving dialysis which was likely due to the patient being dehydrated 2/2 too much volume being taken off during dialysis. The patient was admitted to CCU for further management. In the CCU, the patient was seen by his outpatient cardiologist who recommended giving the patient some fluid back slowly. The patient received a 250 cc bolus of normal saline which led to improvement in symptoms. On hospital day 2, the patient still did not feel back to normal and was given another 250 cc of normal saline resulting in improvement. After patient's symptoms improved, he was deemed stable for discharge to home. Home services were reinstated on discharge.    Of note, the patient's INR was found to be 4.67 on admission. Coumadin was held on night of admission with INR of 4.08 the following day. Coumadin dose was decreased to 2 mg daily on discharge. The patient was told he should follow up with his PCP early next week in order to have his INR re-checked.

## 2017-03-17 NOTE — DISCHARGE NOTE ADULT - CARE PLAN
Principal Discharge DX:	Dehydration  Goal:	Resolved.  Instructions for follow-up, activity and diet:	You probably had too much fluid taken off during dialysis and were feeling dehydrated. We gave you some fluid back. For the next day, you should try to drink more fluids but then after you are feeling better, continue with your low salt and restricted diet.  Secondary Diagnosis:	CHF (congestive heart failure)  Goal:	Manage your condition.  Instructions for follow-up, activity and diet:	You should continue with the continuous dobutamine drip and should continue to take all of your medications as prescribed. You should follow up with your cardiologist in 1-2 weeks from leaving the hospital.  Secondary Diagnosis:	AF (atrial fibrillation)  Goal:	Manage your condition.  Instructions for follow-up, activity and diet:	You should continue to take your amiodarone as prescribed. Additionally, your coumadin level was high when you came in to the hospital. We reduced your coumadin dose to 2 mg daily. You should take the coumadin 2 mg every day and make sure you follow up with your PCP early next week to have your INR checked.

## 2017-03-17 NOTE — DISCHARGE NOTE ADULT - MEDICATION SUMMARY - MEDICATIONS TO STOP TAKING
I will STOP taking the medications listed below when I get home from the hospital:  None I will STOP taking the medications listed below when I get home from the hospital:    Plavix 75 mg oral tablet  -- 1 tab(s) by mouth once a day

## 2017-04-17 ENCOUNTER — INPATIENT (INPATIENT)
Facility: HOSPITAL | Age: 62
LOS: 13 days | Discharge: HOME HEALTH | End: 2017-05-01
Attending: INTERNAL MEDICINE | Admitting: INTERNAL MEDICINE
Payer: COMMERCIAL

## 2017-04-18 VITALS
DIASTOLIC BLOOD PRESSURE: 64 MMHG | RESPIRATION RATE: 17 BRPM | OXYGEN SATURATION: 92 % | TEMPERATURE: 98 F | SYSTOLIC BLOOD PRESSURE: 100 MMHG | HEART RATE: 75 BPM

## 2017-04-18 DIAGNOSIS — R06.02 SHORTNESS OF BREATH: ICD-10-CM

## 2017-04-18 DIAGNOSIS — I48.91 UNSPECIFIED ATRIAL FIBRILLATION: ICD-10-CM

## 2017-04-18 DIAGNOSIS — E11.9 TYPE 2 DIABETES MELLITUS WITHOUT COMPLICATIONS: ICD-10-CM

## 2017-04-18 DIAGNOSIS — Z41.8 ENCOUNTER FOR OTHER PROCEDURES FOR PURPOSES OTHER THAN REMEDYING HEALTH STATE: ICD-10-CM

## 2017-04-18 DIAGNOSIS — E78.5 HYPERLIPIDEMIA, UNSPECIFIED: ICD-10-CM

## 2017-04-18 DIAGNOSIS — I50.9 HEART FAILURE, UNSPECIFIED: ICD-10-CM

## 2017-04-18 DIAGNOSIS — R07.9 CHEST PAIN, UNSPECIFIED: ICD-10-CM

## 2017-04-18 DIAGNOSIS — N18.6 END STAGE RENAL DISEASE: ICD-10-CM

## 2017-04-18 DIAGNOSIS — N40.0 BENIGN PROSTATIC HYPERPLASIA WITHOUT LOWER URINARY TRACT SYMPTOMS: ICD-10-CM

## 2017-04-18 LAB
ALBUMIN SERPL ELPH-MCNC: 3.1 G/DL — LOW (ref 3.3–5)
ALBUMIN SERPL ELPH-MCNC: 3.3 G/DL — SIGNIFICANT CHANGE UP (ref 3.3–5)
ALP SERPL-CCNC: 221 U/L — HIGH (ref 40–120)
ALP SERPL-CCNC: 233 U/L — HIGH (ref 40–120)
ALT FLD-CCNC: 15 U/L — SIGNIFICANT CHANGE UP (ref 4–41)
ALT FLD-CCNC: 15 U/L — SIGNIFICANT CHANGE UP (ref 4–41)
APTT BLD: 40.3 SEC — HIGH (ref 27.5–37.4)
APTT BLD: 44 SEC — HIGH (ref 27.5–37.4)
AST SERPL-CCNC: 17 U/L — SIGNIFICANT CHANGE UP (ref 4–40)
AST SERPL-CCNC: 19 U/L — SIGNIFICANT CHANGE UP (ref 4–40)
BASOPHILS # BLD AUTO: 0 K/UL — SIGNIFICANT CHANGE UP (ref 0–0.2)
BASOPHILS NFR BLD AUTO: 0 % — SIGNIFICANT CHANGE UP (ref 0–2)
BILIRUB SERPL-MCNC: 0.6 MG/DL — SIGNIFICANT CHANGE UP (ref 0.2–1.2)
BILIRUB SERPL-MCNC: 0.7 MG/DL — SIGNIFICANT CHANGE UP (ref 0.2–1.2)
BUN SERPL-MCNC: 78 MG/DL — HIGH (ref 7–23)
BUN SERPL-MCNC: 82 MG/DL — HIGH (ref 7–23)
CALCIUM SERPL-MCNC: 9.1 MG/DL — SIGNIFICANT CHANGE UP (ref 8.4–10.5)
CALCIUM SERPL-MCNC: 9.1 MG/DL — SIGNIFICANT CHANGE UP (ref 8.4–10.5)
CHLORIDE SERPL-SCNC: 85 MMOL/L — LOW (ref 98–107)
CHLORIDE SERPL-SCNC: 86 MMOL/L — LOW (ref 98–107)
CHOLEST SERPL-MCNC: 93 MG/DL — LOW (ref 120–199)
CK MB BLD-MCNC: 4.96 NG/ML — SIGNIFICANT CHANGE UP (ref 1–6.6)
CK MB BLD-MCNC: 4.98 NG/ML — SIGNIFICANT CHANGE UP (ref 1–6.6)
CK MB BLD-MCNC: 5.55 NG/ML — SIGNIFICANT CHANGE UP (ref 1–6.6)
CK MB BLD-MCNC: SIGNIFICANT CHANGE UP (ref 0–2.5)
CK MB BLD-MCNC: SIGNIFICANT CHANGE UP (ref 0–2.5)
CK SERPL-CCNC: 50 U/L — SIGNIFICANT CHANGE UP (ref 30–200)
CK SERPL-CCNC: 53 U/L — SIGNIFICANT CHANGE UP (ref 30–200)
CK SERPL-CCNC: 54 U/L — SIGNIFICANT CHANGE UP (ref 30–200)
CO2 SERPL-SCNC: 20 MMOL/L — LOW (ref 22–31)
CO2 SERPL-SCNC: 20 MMOL/L — LOW (ref 22–31)
CREAT SERPL-MCNC: 6.18 MG/DL — HIGH (ref 0.5–1.3)
CREAT SERPL-MCNC: 6.33 MG/DL — HIGH (ref 0.5–1.3)
EOSINOPHIL # BLD AUTO: 0.24 K/UL — SIGNIFICANT CHANGE UP (ref 0–0.5)
EOSINOPHIL NFR BLD AUTO: 2.8 % — SIGNIFICANT CHANGE UP (ref 0–6)
GLUCOSE SERPL-MCNC: 208 MG/DL — HIGH (ref 70–99)
GLUCOSE SERPL-MCNC: 216 MG/DL — HIGH (ref 70–99)
HBV SURFACE AG SER-ACNC: NEGATIVE — SIGNIFICANT CHANGE UP
HCT VFR BLD CALC: 38.2 % — LOW (ref 39–50)
HCT VFR BLD CALC: 39.6 % — SIGNIFICANT CHANGE UP (ref 39–50)
HDLC SERPL-MCNC: 60 MG/DL — HIGH (ref 35–55)
HGB BLD-MCNC: 11.8 G/DL — LOW (ref 13–17)
HGB BLD-MCNC: 12.3 G/DL — LOW (ref 13–17)
IMM GRANULOCYTES NFR BLD AUTO: 0.2 % — SIGNIFICANT CHANGE UP (ref 0–1.5)
INR BLD: 2.63 — HIGH (ref 0.88–1.17)
INR BLD: 2.77 — HIGH (ref 0.88–1.17)
LIPID PNL WITH DIRECT LDL SERPL: 26 MG/DL — SIGNIFICANT CHANGE UP
LYMPHOCYTES # BLD AUTO: 1.16 K/UL — SIGNIFICANT CHANGE UP (ref 1–3.3)
LYMPHOCYTES # BLD AUTO: 13.7 % — SIGNIFICANT CHANGE UP (ref 13–44)
MAGNESIUM SERPL-MCNC: 2.2 MG/DL — SIGNIFICANT CHANGE UP (ref 1.6–2.6)
MCHC RBC-ENTMCNC: 28.2 PG — SIGNIFICANT CHANGE UP (ref 27–34)
MCHC RBC-ENTMCNC: 28.6 PG — SIGNIFICANT CHANGE UP (ref 27–34)
MCHC RBC-ENTMCNC: 30.9 % — LOW (ref 32–36)
MCHC RBC-ENTMCNC: 31.1 % — LOW (ref 32–36)
MCV RBC AUTO: 91.2 FL — SIGNIFICANT CHANGE UP (ref 80–100)
MCV RBC AUTO: 92.1 FL — SIGNIFICANT CHANGE UP (ref 80–100)
MONOCYTES # BLD AUTO: 0.89 K/UL — SIGNIFICANT CHANGE UP (ref 0–0.9)
MONOCYTES NFR BLD AUTO: 10.5 % — SIGNIFICANT CHANGE UP (ref 2–14)
NEUTROPHILS # BLD AUTO: 6.17 K/UL — SIGNIFICANT CHANGE UP (ref 1.8–7.4)
NEUTROPHILS NFR BLD AUTO: 72.8 % — SIGNIFICANT CHANGE UP (ref 43–77)
PHOSPHATE SERPL-MCNC: 4.9 MG/DL — HIGH (ref 2.5–4.5)
PLATELET # BLD AUTO: 132 K/UL — LOW (ref 150–400)
PLATELET # BLD AUTO: 137 K/UL — LOW (ref 150–400)
PMV BLD: 12 FL — SIGNIFICANT CHANGE UP (ref 7–13)
PMV BLD: 12.2 FL — SIGNIFICANT CHANGE UP (ref 7–13)
POTASSIUM SERPL-MCNC: 4.2 MMOL/L — SIGNIFICANT CHANGE UP (ref 3.5–5.3)
POTASSIUM SERPL-MCNC: 4.3 MMOL/L — SIGNIFICANT CHANGE UP (ref 3.5–5.3)
POTASSIUM SERPL-SCNC: 4.2 MMOL/L — SIGNIFICANT CHANGE UP (ref 3.5–5.3)
POTASSIUM SERPL-SCNC: 4.3 MMOL/L — SIGNIFICANT CHANGE UP (ref 3.5–5.3)
PROT SERPL-MCNC: 7.2 G/DL — SIGNIFICANT CHANGE UP (ref 6–8.3)
PROT SERPL-MCNC: 8.2 G/DL — SIGNIFICANT CHANGE UP (ref 6–8.3)
PROTHROM AB SERPL-ACNC: 30 SEC — HIGH (ref 9.8–13.1)
PROTHROM AB SERPL-ACNC: 31.7 SEC — HIGH (ref 9.8–13.1)
RBC # BLD: 4.19 M/UL — LOW (ref 4.2–5.8)
RBC # BLD: 4.3 M/UL — SIGNIFICANT CHANGE UP (ref 4.2–5.8)
RBC # FLD: 18.2 % — HIGH (ref 10.3–14.5)
RBC # FLD: 18.2 % — HIGH (ref 10.3–14.5)
SODIUM SERPL-SCNC: 128 MMOL/L — LOW (ref 135–145)
SODIUM SERPL-SCNC: 130 MMOL/L — LOW (ref 135–145)
TRIGL SERPL-MCNC: 40 MG/DL — SIGNIFICANT CHANGE UP (ref 10–149)
TROPONIN T SERPL-MCNC: 0.12 NG/ML — HIGH (ref 0–0.06)
TROPONIN T SERPL-MCNC: 0.14 NG/ML — HIGH (ref 0–0.06)
TROPONIN T SERPL-MCNC: 0.15 NG/ML — HIGH (ref 0–0.06)
TSH SERPL-MCNC: 4.05 UIU/ML — SIGNIFICANT CHANGE UP (ref 0.27–4.2)
WBC # BLD: 8.46 K/UL — SIGNIFICANT CHANGE UP (ref 3.8–10.5)
WBC # BLD: 8.48 K/UL — SIGNIFICANT CHANGE UP (ref 3.8–10.5)
WBC # FLD AUTO: 8.46 K/UL — SIGNIFICANT CHANGE UP (ref 3.8–10.5)
WBC # FLD AUTO: 8.48 K/UL — SIGNIFICANT CHANGE UP (ref 3.8–10.5)

## 2017-04-18 PROCEDURE — 71010: CPT | Mod: 26

## 2017-04-18 PROCEDURE — 93282 PRGRMG EVAL IMPLANTABLE DFB: CPT | Mod: 26

## 2017-04-18 PROCEDURE — 99255 IP/OBS CONSLTJ NEW/EST HI 80: CPT

## 2017-04-18 RX ORDER — DEXTROSE 50 % IN WATER 50 %
12.5 SYRINGE (ML) INTRAVENOUS ONCE
Qty: 0 | Refills: 0 | Status: DISCONTINUED | OUTPATIENT
Start: 2017-04-18 | End: 2017-05-01

## 2017-04-18 RX ORDER — LEVOTHYROXINE SODIUM 125 MCG
75 TABLET ORAL DAILY
Qty: 0 | Refills: 0 | Status: DISCONTINUED | OUTPATIENT
Start: 2017-04-18 | End: 2017-05-01

## 2017-04-18 RX ORDER — INSULIN GLARGINE 100 [IU]/ML
4 INJECTION, SOLUTION SUBCUTANEOUS AT BEDTIME
Qty: 0 | Refills: 0 | Status: DISCONTINUED | OUTPATIENT
Start: 2017-04-18 | End: 2017-05-01

## 2017-04-18 RX ORDER — WARFARIN SODIUM 2.5 MG/1
2 TABLET ORAL ONCE
Qty: 0 | Refills: 0 | Status: COMPLETED | OUTPATIENT
Start: 2017-04-18 | End: 2017-04-18

## 2017-04-18 RX ORDER — SODIUM CHLORIDE 9 MG/ML
1000 INJECTION, SOLUTION INTRAVENOUS
Qty: 0 | Refills: 0 | Status: DISCONTINUED | OUTPATIENT
Start: 2017-04-18 | End: 2017-05-01

## 2017-04-18 RX ORDER — DEXTROSE 50 % IN WATER 50 %
1 SYRINGE (ML) INTRAVENOUS ONCE
Qty: 0 | Refills: 0 | Status: DISCONTINUED | OUTPATIENT
Start: 2017-04-18 | End: 2017-05-01

## 2017-04-18 RX ORDER — DEXTROSE 50 % IN WATER 50 %
25 SYRINGE (ML) INTRAVENOUS ONCE
Qty: 0 | Refills: 0 | Status: DISCONTINUED | OUTPATIENT
Start: 2017-04-18 | End: 2017-05-01

## 2017-04-18 RX ORDER — INSULIN LISPRO 100/ML
VIAL (ML) SUBCUTANEOUS
Qty: 0 | Refills: 0 | Status: DISCONTINUED | OUTPATIENT
Start: 2017-04-18 | End: 2017-05-01

## 2017-04-18 RX ORDER — INSULIN NPH HUM/REG INSULIN HM 70-30/ML
25 VIAL (ML) SUBCUTANEOUS
Qty: 0 | Refills: 0 | COMMUNITY

## 2017-04-18 RX ORDER — INSULIN LISPRO 100/ML
VIAL (ML) SUBCUTANEOUS AT BEDTIME
Qty: 0 | Refills: 0 | Status: DISCONTINUED | OUTPATIENT
Start: 2017-04-18 | End: 2017-05-01

## 2017-04-18 RX ORDER — FINASTERIDE 5 MG/1
5 TABLET, FILM COATED ORAL DAILY
Qty: 0 | Refills: 0 | Status: DISCONTINUED | OUTPATIENT
Start: 2017-04-18 | End: 2017-05-01

## 2017-04-18 RX ORDER — WARFARIN SODIUM 2.5 MG/1
1 TABLET ORAL
Qty: 0 | Refills: 0 | COMMUNITY

## 2017-04-18 RX ORDER — SEVELAMER CARBONATE 2400 MG/1
800 POWDER, FOR SUSPENSION ORAL
Qty: 0 | Refills: 0 | Status: DISCONTINUED | OUTPATIENT
Start: 2017-04-18 | End: 2017-05-01

## 2017-04-18 RX ORDER — GLUCAGON INJECTION, SOLUTION 0.5 MG/.1ML
1 INJECTION, SOLUTION SUBCUTANEOUS ONCE
Qty: 0 | Refills: 0 | Status: DISCONTINUED | OUTPATIENT
Start: 2017-04-18 | End: 2017-05-01

## 2017-04-18 RX ORDER — ATORVASTATIN CALCIUM 80 MG/1
20 TABLET, FILM COATED ORAL AT BEDTIME
Qty: 0 | Refills: 0 | Status: DISCONTINUED | OUTPATIENT
Start: 2017-04-18 | End: 2017-05-01

## 2017-04-18 RX ORDER — MIDODRINE HYDROCHLORIDE 2.5 MG/1
30 TABLET ORAL EVERY 8 HOURS
Qty: 0 | Refills: 0 | Status: DISCONTINUED | OUTPATIENT
Start: 2017-04-18 | End: 2017-05-01

## 2017-04-18 RX ORDER — AMIODARONE HYDROCHLORIDE 400 MG/1
200 TABLET ORAL DAILY
Qty: 0 | Refills: 0 | Status: DISCONTINUED | OUTPATIENT
Start: 2017-04-18 | End: 2017-05-01

## 2017-04-18 RX ADMIN — FINASTERIDE 5 MILLIGRAM(S): 5 TABLET, FILM COATED ORAL at 12:26

## 2017-04-18 RX ADMIN — SEVELAMER CARBONATE 800 MILLIGRAM(S): 2400 POWDER, FOR SUSPENSION ORAL at 12:50

## 2017-04-18 RX ADMIN — Medication 75 MICROGRAM(S): at 06:31

## 2017-04-18 RX ADMIN — SEVELAMER CARBONATE 800 MILLIGRAM(S): 2400 POWDER, FOR SUSPENSION ORAL at 09:50

## 2017-04-18 RX ADMIN — WARFARIN SODIUM 2 MILLIGRAM(S): 2.5 TABLET ORAL at 22:43

## 2017-04-18 RX ADMIN — MIDODRINE HYDROCHLORIDE 30 MILLIGRAM(S): 2.5 TABLET ORAL at 06:31

## 2017-04-18 RX ADMIN — AMIODARONE HYDROCHLORIDE 200 MILLIGRAM(S): 400 TABLET ORAL at 06:31

## 2017-04-18 RX ADMIN — MIDODRINE HYDROCHLORIDE 30 MILLIGRAM(S): 2.5 TABLET ORAL at 22:43

## 2017-04-18 RX ADMIN — ATORVASTATIN CALCIUM 20 MILLIGRAM(S): 80 TABLET, FILM COATED ORAL at 22:43

## 2017-04-18 RX ADMIN — Medication 1: at 12:49

## 2017-04-18 RX ADMIN — MIDODRINE HYDROCHLORIDE 30 MILLIGRAM(S): 2.5 TABLET ORAL at 14:32

## 2017-04-18 RX ADMIN — Medication: at 09:00

## 2017-04-18 RX ADMIN — INSULIN GLARGINE 4 UNIT(S): 100 INJECTION, SOLUTION SUBCUTANEOUS at 22:42

## 2017-04-18 NOTE — H&P ADULT. - PROBLEM SELECTOR PLAN 2
Likely in the setting of needing dialysis and underline severe CHF  NC at 2L, continuous pulse oxygen

## 2017-04-18 NOTE — H&P ADULT. - PROBLEM SELECTOR PLAN 4
Patient gets dialysis thru the right chest wall on Tue/Thurs/Sat and last HD was on Sat  Will need to call Renal in am for inpatient dialysis setup(will need to call Dr. Costello)   Avoid nephrotoxic medications   Continue with Renvela daily

## 2017-04-18 NOTE — PHYSICAL THERAPY INITIAL EVALUATION ADULT - PLANNED THERAPY INTERVENTIONS, PT EVAL
bed mobility training/Pt left semi reclined on stretcher in ED in NAD; +siderails; positioned for safety and comfort./strengthening/transfer training/gait training/balance training

## 2017-04-18 NOTE — H&P ADULT. - NEGATIVE ENMT SYMPTOMS
no hearing difficulty/no sinus symptoms/no nasal congestion/no nasal discharge/no vertigo/no tinnitus/no ear pain

## 2017-04-18 NOTE — H&P ADULT. - EKG AND INTERPRETATION
EKG: Atrial fibrillation with variable AV block with premature ventricular or aberrantly conducted complexes at a rate of 77

## 2017-04-18 NOTE — ED PROVIDER NOTE - PROGRESS NOTE DETAILS
Farrah PGY3: d/w Dr. Costello (partner of Dr. Davis) who states pt was recommended palliative at [place] and declined, dobutamine stopped as it was not improving his condition. Recommends calling Dr. Irvin for admission. [place] PGY3: d/w Dr. Irvin, accepted to his service, tele PA text page sent

## 2017-04-18 NOTE — H&P ADULT. - GASTROINTESTINAL DETAILS
no guarding/no rebound tenderness/soft/nontender/normal/no rigidity/bowel sounds normal/no distention

## 2017-04-18 NOTE — ED ADULT NURSE REASSESSMENT NOTE - NS ED NURSE REASSESS COMMENT FT1
Pt received on stretcher in room 3. Pt is awake and alert, NAD observed, respirations even and unlabored on O2 via NC. Awaiting lab and XR results.

## 2017-04-18 NOTE — PHYSICAL THERAPY INITIAL EVALUATION ADULT - PERTINENT HX OF CURRENT PROBLEM, REHAB EVAL
60 y/o M with PMH of Severe CHF(EF of 25%, AICD, was on dobutamine drip recently d/c at [place] last Tuesday 4/11/17 and on chronic 2-3L at home of O2), Afib(on coumadin), ESRD(on HD Tues/Thurs/Sat), BPH, DM, HLD, Gout, HTN presented after d/c from [facility] yesterday for midsternal chest pain and SOB.

## 2017-04-18 NOTE — ED PROVIDER NOTE - SKIN, MLM
Skin normal color for race, warm, dry and intact. No evidence of rash. R chest wall Shiley in place, clean appearing

## 2017-04-18 NOTE — H&P ADULT. - ASSESSMENT
60 y/o M with PMH of Severe CHF(EF of 25%, AICD, was on dobutamine drip recently d/c at [place] last Tuesday 411/17 and on chronic 2-3L at home of O2), Afib(on coumadin), ESRD(on HD Tues/Thurs/Sat), BPH, DM, HLD, Gout, HTN presented after d/c from [facility] yesterday for midsternal chest pain and SOB.    +Chest pain-R/o ACS   +SOB-Likely secondary to underline ESRD and severe CHF

## 2017-04-18 NOTE — H&P ADULT. - NEGATIVE NEUROLOGICAL SYMPTOMS
no headache/no tremors/no vertigo/no weakness/no difficulty walking/no confusion/no loss of consciousness/no paresthesias/no transient paralysis/no generalized seizures/no syncope/no focal seizures/no hemiparesis/no loss of sensation

## 2017-04-18 NOTE — H&P ADULT. - HISTORY OF PRESENT ILLNESS
62 y/o M with PMH of Severe CHF(EF of 25%, AICD, was on dobutamine drip recently d/c at [place] last Tuesday 4/11/17 and on chronic 2-3L at home of O2), Afib(on coumadin), ESRD(on HD Tues/Thurs/Sat), BPH, DM, HLD, Gout, HTN presented after d/c from [facility] yesterday for midsternal chest pain and SOB. As per the patient he was discharged from [facility] yesterday and was there for CHF exacerbation and had his dobutamine drip discontinued on Tuesday 4/11. Patient stated that when he was discharged he "felt a little better" and when he reached home he developed sudden onset midsternal chest pain. Patient stated that the chest pain was intermittent, characterized as a pressure like sensation, without any aggravating or alleviating factors, without any radiation of the chest pain, with a severity of 6/10. Patient stated that he has never had chest pain like this before so he decided to come to the ER. Patient stated that normally he has baseline SOB and for the past day he is unable to even walk few feet and has to stop multiple times to catch his breath. Patient stated that he normally sleeps with 4 pillow at night which is chronic for him, and endorsed orthopnea and PND. Patient stated that he is compliant with his heart medications and also limits his salt and water intake. Patient also endorsed productive cough with white sputum production. Patient denied any fevers, N/V/D/C, chills, abdominal pain, melena, hematochezia, dysuria, recent travel, sick contact, pleuritic or positional chest pain.     On ED admission EKG revealed Atrial fibrillation with variable AV block with premature ventricular or aberrantly conducted complexes at a rate of 77, CE x 1: Trop: 0.15, CK: Negative, Na: 128, BUN/Cr: 78/6.51, Gluc: 208, Plt: 132. Prelim CXR: Pulmonary edema. When examined patient is resting in the stretcher and stated that his breathing is "bit better from when he came in".

## 2017-04-18 NOTE — H&P ADULT. - ATTENDING COMMENTS
pt seen and examined  above reviewed  edited where appropiate  renal/cards eval  overall prognosis poor  hospice eval /pall eval

## 2017-04-18 NOTE — H&P ADULT. - NEGATIVE GASTROINTESTINAL SYMPTOMS
no diarrhea/no change in bowel habits/no vomiting/no hematochezia/no nausea/no constipation/no abdominal pain/no melena

## 2017-04-18 NOTE — ED PROVIDER NOTE - CARE PLAN
Principal Discharge DX:	SOB (shortness of breath)  Secondary Diagnosis:	CHF (congestive heart failure)  Secondary Diagnosis:	ESRD (end stage renal disease)

## 2017-04-18 NOTE — H&P ADULT. - NEGATIVE OPHTHALMOLOGIC SYMPTOMS
no diplopia/no lacrimation L/no discharge R/no blurred vision R/no photophobia/no discharge L/no lacrimation R/no blurred vision L

## 2017-04-18 NOTE — ED PROVIDER NOTE - MEDICAL DECISION MAKING DETAILS
Pt with multiple cardiac and pulmonary comorbidities p/w SOB and CP, will obtain labs, cxr, admit for tele monitoring

## 2017-04-18 NOTE — H&P ADULT. - PSH
AICD (automatic cardioverter/defibrillator) present  Biotronic - placed 9/11/09  H/O coronary angiogram

## 2017-04-18 NOTE — ED PROVIDER NOTE - OBJECTIVE STATEMENT
61M with HTN, CHF with AICD, chronically low BP on midodrine, ESRD on HD TThS (last HD S) p/w CP and SOB x several hours. Reports he was discharged from [place] at about 1700 yesterday after admission for SOB, then developed severe substernal chest tightness and SOB. 61M with HTN, CHF with AICD, chronically low BP on midodrine, ESRD on HD TThS (last HD S) p/w CP and SOB x several hours. Reports he was discharged from [place] at about 1700 yesterday after admission for SOB, then developed severe substernal chest tightness and SOB. Has not had this pain before per pt. Has chronic SOB but this is worse. Per family pt has been on dobutamine pump x 1 year but was d/c'd 1 week ago at [place]. Denies vomiting, fever. 61M with HTN, CHF with AICD, chronically low BP on midodrine, ESRD on HD TThS (last HD S) p/w CP and SOB x several hours. Reports he was discharged from [place] at about 1700 yesterday after admission for SOB, then developed severe substernal chest tightness and SOB. Has not had this pain before per pt. Has chronic SOB but this is worse. Per family pt has been on dobutamine pump x 1 year but was d/c'd 1 week ago at [place]. Denies vomiting, fever.    Cards: Thaddeus Davis  Renal: Christopher Barrios

## 2017-04-18 NOTE — H&P ADULT. - PROBLEM SELECTOR PLAN 3
Admit to telemetry, serial Bert, serial EKGs  f/u MD note   HgbA1C, TSH, lipid profile, CBC, CMP in am   Low sodium diet, daily weights, monitor I's and O's, fluid restrictions 1000cc/day  Check BNP in 48 hours   Continue with Midodrine for BP control   Will need to call HF team in am as patient as with severe CHF and recently discontinued dobutamine drip   Call EP in am for AICD interrogation(B-[id number])    Continue with Amiodarone 200mg QD   Cardiology consult in am to be called Admit to telemetry, serial Bert, serial EKGs  f/u MD note   HgbA1C, TSH, lipid profile, CBC, CMP in am   Low sodium diet, daily weights, monitor I's and O's, fluid restrictions 1000cc/day  Check BNP in 48 hours   Continue with Midodrine for BP control   Will need to call HF team in am as patient as with severe CHF and recently discontinued dobutamine drip   Call EP in am for AICD interrogation([employer])    Continue with Amiodarone 200mg QD   Cardiology consult with Dr. Davis to be called in am

## 2017-04-18 NOTE — H&P ADULT. - PROBLEM SELECTOR PLAN 1
Will admit to telemetry, serial EKG, serial Bert prn chest pain/SOB/palpitations  f/u MD note   HgbA1C, TSH, lipid profile, CBC, CMP in am   Cardiology consult in am to be called Will admit to telemetry, serial EKG, serial Bert prn chest pain/SOB/palpitations  f/u MD note   HgbA1C, TSH, lipid profile, CBC, CMP in am   Cardiology consult with Dr. Davis to be called in am

## 2017-04-18 NOTE — H&P ADULT. - NEGATIVE MUSCULOSKELETAL SYMPTOMS
no muscle cramps/no neck pain/no muscle weakness/no stiffness/no arthralgia/no joint swelling/no myalgia/no arthritis

## 2017-04-18 NOTE — H&P ADULT. - RS GEN PE MLT RESP DETAILS PC
no chest wall tenderness/no rhonchi/rales/no wheezes/airway patent/no intercostal retractions/normal/respirations non-labored

## 2017-04-18 NOTE — ED ADULT TRIAGE NOTE - CHIEF COMPLAINT QUOTE
was d/c'd from hospital today, c/o of cp starting at 6pm worsening. pt gets dialysis R [facility] stephen T, TH, Sat.  Placed on NC 2L by EMS.

## 2017-04-18 NOTE — ED PROVIDER NOTE - ATTENDING CONTRIBUTION TO CARE
Dash  pt with h/o CHF  CRI on HD (due tomorrow)  just discharged from [facility] (2-3 week stay for SOB)  Pt was on IV dobutamine which was stopped there   Pt notes persistent SOB  recurrence of chest tightness which he had on admission to [facility] which he associates with SOB    pt RR 20  rales rt base   card irreg  murmur LSB  b/l pitting edema  EKG a flutter (baseline)    no acute changes  pt maintaining sats low to mid 90s on NC (on baseline 2-3 NC) Dash  pt with h/o CHF  CRI on HD (due tomorrow)  just discharged from [facility] (2-3 week stay for SOB)  Pt was on IV dobutamine which was stopped there   Pt notes persistent SOB  recurrence of chest tightness which he had on admission to [facility] which he associates with SOB    pt RR 20  rales rt base   card irreg  murmur LSB  b/l pitting edema  EKG a flutter (baseline)    no acute changes  pt maintaining sats low to mid 90s on NC (on baseline 2-3 NC)  CXR interstitial congestion (present baseline)  d/w PMD  does not want dobutamine restarted at this pint  will need dialysis im am  trop elevated (CRI)

## 2017-04-19 LAB
BUN SERPL-MCNC: 62 MG/DL — HIGH (ref 7–23)
CALCIUM SERPL-MCNC: 9.4 MG/DL — SIGNIFICANT CHANGE UP (ref 8.4–10.5)
CHLORIDE SERPL-SCNC: 89 MMOL/L — LOW (ref 98–107)
CO2 SERPL-SCNC: 22 MMOL/L — SIGNIFICANT CHANGE UP (ref 22–31)
CREAT SERPL-MCNC: 5.11 MG/DL — HIGH (ref 0.5–1.3)
GIANT PLATELETS BLD QL SMEAR: PRESENT — SIGNIFICANT CHANGE UP
GLUCOSE SERPL-MCNC: 227 MG/DL — HIGH (ref 70–99)
HCT VFR BLD CALC: 41.1 % — SIGNIFICANT CHANGE UP (ref 39–50)
HGB BLD-MCNC: 12.6 G/DL — LOW (ref 13–17)
INR BLD: 3.57 — HIGH (ref 0.88–1.17)
LG PLATELETS BLD QL AUTO: SIGNIFICANT CHANGE UP
MAGNESIUM SERPL-MCNC: 2.2 MG/DL — SIGNIFICANT CHANGE UP (ref 1.6–2.6)
MCHC RBC-ENTMCNC: 28.6 PG — SIGNIFICANT CHANGE UP (ref 27–34)
MCHC RBC-ENTMCNC: 30.7 % — LOW (ref 32–36)
MCV RBC AUTO: 93.2 FL — SIGNIFICANT CHANGE UP (ref 80–100)
PLATELET # BLD AUTO: 119 K/UL — LOW (ref 150–400)
PMV BLD: 13 FL — SIGNIFICANT CHANGE UP (ref 7–13)
POTASSIUM SERPL-MCNC: 4.6 MMOL/L — SIGNIFICANT CHANGE UP (ref 3.5–5.3)
POTASSIUM SERPL-SCNC: 4.6 MMOL/L — SIGNIFICANT CHANGE UP (ref 3.5–5.3)
PROTHROM AB SERPL-ACNC: 41 SEC — HIGH (ref 9.8–13.1)
RBC # BLD: 4.41 M/UL — SIGNIFICANT CHANGE UP (ref 4.2–5.8)
RBC # FLD: 18.6 % — HIGH (ref 10.3–14.5)
SODIUM SERPL-SCNC: 131 MMOL/L — LOW (ref 135–145)
WBC # BLD: 9.05 K/UL — SIGNIFICANT CHANGE UP (ref 3.8–10.5)
WBC # FLD AUTO: 9.05 K/UL — SIGNIFICANT CHANGE UP (ref 3.8–10.5)

## 2017-04-19 PROCEDURE — 99233 SBSQ HOSP IP/OBS HIGH 50: CPT

## 2017-04-19 RX ADMIN — SEVELAMER CARBONATE 800 MILLIGRAM(S): 2400 POWDER, FOR SUSPENSION ORAL at 11:53

## 2017-04-19 RX ADMIN — AMIODARONE HYDROCHLORIDE 200 MILLIGRAM(S): 400 TABLET ORAL at 06:02

## 2017-04-19 RX ADMIN — ATORVASTATIN CALCIUM 20 MILLIGRAM(S): 80 TABLET, FILM COATED ORAL at 22:03

## 2017-04-19 RX ADMIN — SEVELAMER CARBONATE 800 MILLIGRAM(S): 2400 POWDER, FOR SUSPENSION ORAL at 08:45

## 2017-04-19 RX ADMIN — Medication 1: at 22:03

## 2017-04-19 RX ADMIN — INSULIN GLARGINE 4 UNIT(S): 100 INJECTION, SOLUTION SUBCUTANEOUS at 22:04

## 2017-04-19 RX ADMIN — Medication 75 MICROGRAM(S): at 06:02

## 2017-04-19 RX ADMIN — MIDODRINE HYDROCHLORIDE 30 MILLIGRAM(S): 2.5 TABLET ORAL at 13:43

## 2017-04-19 RX ADMIN — MIDODRINE HYDROCHLORIDE 30 MILLIGRAM(S): 2.5 TABLET ORAL at 06:02

## 2017-04-19 RX ADMIN — FINASTERIDE 5 MILLIGRAM(S): 5 TABLET, FILM COATED ORAL at 11:53

## 2017-04-19 NOTE — DIETITIAN INITIAL EVALUATION ADULT. - OTHER INFO
Pt states that his appetite has been good and he has been eating well. Pt receives dialysis 3x/week and sees a renal dietitian there. Pt states that he knows and follows his diet at home. Pt had no complaints of GI distress nor of difficulty chewing or swallowing.

## 2017-04-19 NOTE — DIETITIAN INITIAL EVALUATION ADULT. - PROBLEM SELECTOR PLAN 1
Will admit to telemetry, serial EKG, serial Bert prn chest pain/SOB/palpitations  f/u MD note   HgbA1C, TSH, lipid profile, CBC, CMP in am   Cardiology consult with Dr. Davis to be called in am

## 2017-04-19 NOTE — DIETITIAN INITIAL EVALUATION ADULT. - PROBLEM SELECTOR PLAN 3
Admit to telemetry, serial Bert, serial EKGs  f/u MD note   HgbA1C, TSH, lipid profile, CBC, CMP in am   Low sodium diet, daily weights, monitor I's and O's, fluid restrictions 1000cc/day  Check BNP in 48 hours   Continue with Midodrine for BP control   Will need to call HF team in am as patient as with severe CHF and recently discontinued dobutamine drip   Call EP in am for AICD interrogation([employer])    Continue with Amiodarone 200mg QD   Cardiology consult with Dr. Davis to be called in am

## 2017-04-19 NOTE — DIETITIAN INITIAL EVALUATION ADULT. - DIET TYPE
1000ml/consistent carbohydrate (no snacks)/renal replacement pts:no protein restr,no conc K & phos, low sodium

## 2017-04-20 LAB
BUN SERPL-MCNC: 71 MG/DL — HIGH (ref 7–23)
CALCIUM SERPL-MCNC: 9.2 MG/DL — SIGNIFICANT CHANGE UP (ref 8.4–10.5)
CHLORIDE SERPL-SCNC: 94 MMOL/L — LOW (ref 98–107)
CO2 SERPL-SCNC: 22 MMOL/L — SIGNIFICANT CHANGE UP (ref 22–31)
CREAT SERPL-MCNC: 5.85 MG/DL — HIGH (ref 0.5–1.3)
GLUCOSE SERPL-MCNC: 184 MG/DL — HIGH (ref 70–99)
HCT VFR BLD CALC: 37.9 % — LOW (ref 39–50)
HGB BLD-MCNC: 11.8 G/DL — LOW (ref 13–17)
INR BLD: 3.07 — HIGH (ref 0.88–1.17)
MCHC RBC-ENTMCNC: 28.7 PG — SIGNIFICANT CHANGE UP (ref 27–34)
MCHC RBC-ENTMCNC: 31.1 % — LOW (ref 32–36)
MCV RBC AUTO: 92.2 FL — SIGNIFICANT CHANGE UP (ref 80–100)
NT-PROBNP SERPL-SCNC: SIGNIFICANT CHANGE UP PG/ML
PLATELET # BLD AUTO: 118 K/UL — LOW (ref 150–400)
PMV BLD: 12.5 FL — SIGNIFICANT CHANGE UP (ref 7–13)
POTASSIUM SERPL-MCNC: 4.1 MMOL/L — SIGNIFICANT CHANGE UP (ref 3.5–5.3)
POTASSIUM SERPL-SCNC: 4.1 MMOL/L — SIGNIFICANT CHANGE UP (ref 3.5–5.3)
PROTHROM AB SERPL-ACNC: 35.2 SEC — HIGH (ref 9.8–13.1)
RBC # BLD: 4.11 M/UL — LOW (ref 4.2–5.8)
RBC # FLD: 18.3 % — HIGH (ref 10.3–14.5)
SODIUM SERPL-SCNC: 132 MMOL/L — LOW (ref 135–145)
WBC # BLD: 7.75 K/UL — SIGNIFICANT CHANGE UP (ref 3.8–10.5)
WBC # FLD AUTO: 7.75 K/UL — SIGNIFICANT CHANGE UP (ref 3.8–10.5)

## 2017-04-20 PROCEDURE — 99223 1ST HOSP IP/OBS HIGH 75: CPT

## 2017-04-20 PROCEDURE — 99233 SBSQ HOSP IP/OBS HIGH 50: CPT

## 2017-04-20 RX ORDER — HYDROMORPHONE HYDROCHLORIDE 2 MG/ML
1 INJECTION INTRAMUSCULAR; INTRAVENOUS; SUBCUTANEOUS EVERY 8 HOURS
Qty: 0 | Refills: 0 | Status: DISCONTINUED | OUTPATIENT
Start: 2017-04-20 | End: 2017-04-20

## 2017-04-20 RX ORDER — ACETAMINOPHEN 500 MG
650 TABLET ORAL EVERY 6 HOURS
Qty: 0 | Refills: 0 | Status: COMPLETED | OUTPATIENT
Start: 2017-04-20 | End: 2017-04-21

## 2017-04-20 RX ORDER — HYDROMORPHONE HYDROCHLORIDE 2 MG/ML
1 INJECTION INTRAMUSCULAR; INTRAVENOUS; SUBCUTANEOUS EVERY 4 HOURS
Qty: 0 | Refills: 0 | Status: DISCONTINUED | OUTPATIENT
Start: 2017-04-20 | End: 2017-04-20

## 2017-04-20 RX ADMIN — FINASTERIDE 5 MILLIGRAM(S): 5 TABLET, FILM COATED ORAL at 11:25

## 2017-04-20 RX ADMIN — MIDODRINE HYDROCHLORIDE 30 MILLIGRAM(S): 2.5 TABLET ORAL at 14:12

## 2017-04-20 RX ADMIN — SEVELAMER CARBONATE 800 MILLIGRAM(S): 2400 POWDER, FOR SUSPENSION ORAL at 11:26

## 2017-04-20 RX ADMIN — HYDROMORPHONE HYDROCHLORIDE 1 MILLIGRAM(S): 2 INJECTION INTRAMUSCULAR; INTRAVENOUS; SUBCUTANEOUS at 20:51

## 2017-04-20 RX ADMIN — Medication 2: at 22:28

## 2017-04-20 RX ADMIN — Medication 2: at 17:45

## 2017-04-20 RX ADMIN — MIDODRINE HYDROCHLORIDE 30 MILLIGRAM(S): 2.5 TABLET ORAL at 05:35

## 2017-04-20 RX ADMIN — Medication 75 MICROGRAM(S): at 05:35

## 2017-04-20 RX ADMIN — MIDODRINE HYDROCHLORIDE 30 MILLIGRAM(S): 2.5 TABLET ORAL at 20:50

## 2017-04-20 RX ADMIN — INSULIN GLARGINE 4 UNIT(S): 100 INJECTION, SOLUTION SUBCUTANEOUS at 22:29

## 2017-04-20 RX ADMIN — SEVELAMER CARBONATE 800 MILLIGRAM(S): 2400 POWDER, FOR SUSPENSION ORAL at 17:45

## 2017-04-20 RX ADMIN — AMIODARONE HYDROCHLORIDE 200 MILLIGRAM(S): 400 TABLET ORAL at 05:35

## 2017-04-20 RX ADMIN — ATORVASTATIN CALCIUM 20 MILLIGRAM(S): 80 TABLET, FILM COATED ORAL at 20:50

## 2017-04-20 RX ADMIN — Medication 1: at 11:25

## 2017-04-21 LAB
BUN SERPL-MCNC: 51 MG/DL — HIGH (ref 7–23)
CALCIUM SERPL-MCNC: 9.2 MG/DL — SIGNIFICANT CHANGE UP (ref 8.4–10.5)
CHLORIDE SERPL-SCNC: 96 MMOL/L — LOW (ref 98–107)
CO2 SERPL-SCNC: 21 MMOL/L — LOW (ref 22–31)
CREAT SERPL-MCNC: 4.89 MG/DL — HIGH (ref 0.5–1.3)
GLUCOSE SERPL-MCNC: 262 MG/DL — HIGH (ref 70–99)
HCT VFR BLD CALC: 38.4 % — LOW (ref 39–50)
HGB BLD-MCNC: 11.7 G/DL — LOW (ref 13–17)
INR BLD: 2.47 — HIGH (ref 0.88–1.17)
MCHC RBC-ENTMCNC: 28.6 PG — SIGNIFICANT CHANGE UP (ref 27–34)
MCHC RBC-ENTMCNC: 30.5 % — LOW (ref 32–36)
MCV RBC AUTO: 93.9 FL — SIGNIFICANT CHANGE UP (ref 80–100)
PLATELET # BLD AUTO: 102 K/UL — LOW (ref 150–400)
PMV BLD: SIGNIFICANT CHANGE UP FL (ref 7–13)
POTASSIUM SERPL-MCNC: 4.1 MMOL/L — SIGNIFICANT CHANGE UP (ref 3.5–5.3)
POTASSIUM SERPL-SCNC: 4.1 MMOL/L — SIGNIFICANT CHANGE UP (ref 3.5–5.3)
PROTHROM AB SERPL-ACNC: 28.2 SEC — HIGH (ref 9.8–13.1)
RBC # BLD: 4.09 M/UL — LOW (ref 4.2–5.8)
RBC # FLD: 18.7 % — HIGH (ref 10.3–14.5)
SODIUM SERPL-SCNC: 133 MMOL/L — LOW (ref 135–145)
WBC # BLD: 8.63 K/UL — SIGNIFICANT CHANGE UP (ref 3.8–10.5)
WBC # FLD AUTO: 8.63 K/UL — SIGNIFICANT CHANGE UP (ref 3.8–10.5)

## 2017-04-21 PROCEDURE — 99233 SBSQ HOSP IP/OBS HIGH 50: CPT

## 2017-04-21 PROCEDURE — 99497 ADVNCD CARE PLAN 30 MIN: CPT | Mod: 25

## 2017-04-21 RX ORDER — WARFARIN SODIUM 2.5 MG/1
2 TABLET ORAL ONCE
Qty: 0 | Refills: 0 | Status: COMPLETED | OUTPATIENT
Start: 2017-04-21 | End: 2017-04-21

## 2017-04-21 RX ADMIN — SEVELAMER CARBONATE 800 MILLIGRAM(S): 2400 POWDER, FOR SUSPENSION ORAL at 12:26

## 2017-04-21 RX ADMIN — Medication 650 MILLIGRAM(S): at 12:26

## 2017-04-21 RX ADMIN — MIDODRINE HYDROCHLORIDE 30 MILLIGRAM(S): 2.5 TABLET ORAL at 06:08

## 2017-04-21 RX ADMIN — WARFARIN SODIUM 2 MILLIGRAM(S): 2.5 TABLET ORAL at 18:29

## 2017-04-21 RX ADMIN — AMIODARONE HYDROCHLORIDE 200 MILLIGRAM(S): 400 TABLET ORAL at 06:07

## 2017-04-21 RX ADMIN — ATORVASTATIN CALCIUM 20 MILLIGRAM(S): 80 TABLET, FILM COATED ORAL at 22:36

## 2017-04-21 RX ADMIN — Medication 650 MILLIGRAM(S): at 06:08

## 2017-04-21 RX ADMIN — Medication 75 MICROGRAM(S): at 06:08

## 2017-04-21 RX ADMIN — SEVELAMER CARBONATE 800 MILLIGRAM(S): 2400 POWDER, FOR SUSPENSION ORAL at 08:40

## 2017-04-21 RX ADMIN — Medication 2: at 22:36

## 2017-04-21 RX ADMIN — Medication 1: at 08:40

## 2017-04-21 RX ADMIN — FINASTERIDE 5 MILLIGRAM(S): 5 TABLET, FILM COATED ORAL at 12:26

## 2017-04-21 RX ADMIN — INSULIN GLARGINE 4 UNIT(S): 100 INJECTION, SOLUTION SUBCUTANEOUS at 22:36

## 2017-04-21 RX ADMIN — MIDODRINE HYDROCHLORIDE 30 MILLIGRAM(S): 2.5 TABLET ORAL at 13:34

## 2017-04-21 RX ADMIN — MIDODRINE HYDROCHLORIDE 30 MILLIGRAM(S): 2.5 TABLET ORAL at 22:36

## 2017-04-21 RX ADMIN — Medication 2: at 12:25

## 2017-04-21 RX ADMIN — SEVELAMER CARBONATE 800 MILLIGRAM(S): 2400 POWDER, FOR SUSPENSION ORAL at 17:29

## 2017-04-22 LAB
BUN SERPL-MCNC: 58 MG/DL — HIGH (ref 7–23)
CALCIUM SERPL-MCNC: 9.2 MG/DL — SIGNIFICANT CHANGE UP (ref 8.4–10.5)
CHLORIDE SERPL-SCNC: 95 MMOL/L — LOW (ref 98–107)
CO2 SERPL-SCNC: 23 MMOL/L — SIGNIFICANT CHANGE UP (ref 22–31)
CREAT SERPL-MCNC: 5.36 MG/DL — HIGH (ref 0.5–1.3)
GLUCOSE SERPL-MCNC: 158 MG/DL — HIGH (ref 70–99)
HCT VFR BLD CALC: 37 % — LOW (ref 39–50)
HGB BLD-MCNC: 11.2 G/DL — LOW (ref 13–17)
INR BLD: 2.07 — HIGH (ref 0.88–1.17)
MCHC RBC-ENTMCNC: 27.9 PG — SIGNIFICANT CHANGE UP (ref 27–34)
MCHC RBC-ENTMCNC: 30.3 % — LOW (ref 32–36)
MCV RBC AUTO: 92.3 FL — SIGNIFICANT CHANGE UP (ref 80–100)
PLATELET # BLD AUTO: 116 K/UL — LOW (ref 150–400)
PMV BLD: SIGNIFICANT CHANGE UP FL (ref 7–13)
POTASSIUM SERPL-MCNC: 4.3 MMOL/L — SIGNIFICANT CHANGE UP (ref 3.5–5.3)
POTASSIUM SERPL-SCNC: 4.3 MMOL/L — SIGNIFICANT CHANGE UP (ref 3.5–5.3)
PROTHROM AB SERPL-ACNC: 23.5 SEC — HIGH (ref 9.8–13.1)
RBC # BLD: 4.01 M/UL — LOW (ref 4.2–5.8)
RBC # FLD: 18.3 % — HIGH (ref 10.3–14.5)
SODIUM SERPL-SCNC: 133 MMOL/L — LOW (ref 135–145)
WBC # BLD: 7.65 K/UL — SIGNIFICANT CHANGE UP (ref 3.8–10.5)
WBC # FLD AUTO: 7.65 K/UL — SIGNIFICANT CHANGE UP (ref 3.8–10.5)

## 2017-04-22 RX ORDER — DOCUSATE SODIUM 100 MG
100 CAPSULE ORAL DAILY
Qty: 0 | Refills: 0 | Status: DISCONTINUED | OUTPATIENT
Start: 2017-04-22 | End: 2017-05-01

## 2017-04-22 RX ORDER — WARFARIN SODIUM 2.5 MG/1
2 TABLET ORAL ONCE
Qty: 0 | Refills: 0 | Status: COMPLETED | OUTPATIENT
Start: 2017-04-22 | End: 2017-04-22

## 2017-04-22 RX ADMIN — MIDODRINE HYDROCHLORIDE 30 MILLIGRAM(S): 2.5 TABLET ORAL at 22:19

## 2017-04-22 RX ADMIN — Medication 2: at 22:19

## 2017-04-22 RX ADMIN — SEVELAMER CARBONATE 800 MILLIGRAM(S): 2400 POWDER, FOR SUSPENSION ORAL at 11:28

## 2017-04-22 RX ADMIN — SEVELAMER CARBONATE 800 MILLIGRAM(S): 2400 POWDER, FOR SUSPENSION ORAL at 17:38

## 2017-04-22 RX ADMIN — Medication 1: at 12:29

## 2017-04-22 RX ADMIN — MIDODRINE HYDROCHLORIDE 30 MILLIGRAM(S): 2.5 TABLET ORAL at 05:17

## 2017-04-22 RX ADMIN — ATORVASTATIN CALCIUM 20 MILLIGRAM(S): 80 TABLET, FILM COATED ORAL at 22:19

## 2017-04-22 RX ADMIN — WARFARIN SODIUM 2 MILLIGRAM(S): 2.5 TABLET ORAL at 17:38

## 2017-04-22 RX ADMIN — Medication 75 MICROGRAM(S): at 05:17

## 2017-04-22 RX ADMIN — Medication 100 MILLIGRAM(S): at 23:01

## 2017-04-22 RX ADMIN — INSULIN GLARGINE 4 UNIT(S): 100 INJECTION, SOLUTION SUBCUTANEOUS at 22:19

## 2017-04-22 RX ADMIN — AMIODARONE HYDROCHLORIDE 200 MILLIGRAM(S): 400 TABLET ORAL at 05:17

## 2017-04-22 RX ADMIN — Medication 1: at 17:37

## 2017-04-22 RX ADMIN — MIDODRINE HYDROCHLORIDE 30 MILLIGRAM(S): 2.5 TABLET ORAL at 14:27

## 2017-04-22 RX ADMIN — FINASTERIDE 5 MILLIGRAM(S): 5 TABLET, FILM COATED ORAL at 11:28

## 2017-04-23 LAB
BASOPHILS # BLD AUTO: 0.02 K/UL — SIGNIFICANT CHANGE UP (ref 0–0.2)
BASOPHILS NFR BLD AUTO: 0.3 % — SIGNIFICANT CHANGE UP (ref 0–2)
BUN SERPL-MCNC: 46 MG/DL — HIGH (ref 7–23)
BUN SERPL-MCNC: 47 MG/DL — HIGH (ref 7–23)
CALCIUM SERPL-MCNC: 9.7 MG/DL — SIGNIFICANT CHANGE UP (ref 8.4–10.5)
CALCIUM SERPL-MCNC: 9.8 MG/DL — SIGNIFICANT CHANGE UP (ref 8.4–10.5)
CHLORIDE SERPL-SCNC: 95 MMOL/L — LOW (ref 98–107)
CHLORIDE SERPL-SCNC: 96 MMOL/L — LOW (ref 98–107)
CO2 SERPL-SCNC: 22 MMOL/L — SIGNIFICANT CHANGE UP (ref 22–31)
CO2 SERPL-SCNC: 22 MMOL/L — SIGNIFICANT CHANGE UP (ref 22–31)
CREAT SERPL-MCNC: 4.71 MG/DL — HIGH (ref 0.5–1.3)
CREAT SERPL-MCNC: 4.72 MG/DL — HIGH (ref 0.5–1.3)
EOSINOPHIL # BLD AUTO: 0.2 K/UL — SIGNIFICANT CHANGE UP (ref 0–0.5)
EOSINOPHIL NFR BLD AUTO: 2.6 % — SIGNIFICANT CHANGE UP (ref 0–6)
GLUCOSE SERPL-MCNC: 165 MG/DL — HIGH (ref 70–99)
GLUCOSE SERPL-MCNC: 165 MG/DL — HIGH (ref 70–99)
HCT VFR BLD CALC: 39.7 % — SIGNIFICANT CHANGE UP (ref 39–50)
HCT VFR BLD CALC: 40.2 % — SIGNIFICANT CHANGE UP (ref 39–50)
HGB BLD-MCNC: 12.2 G/DL — LOW (ref 13–17)
HGB BLD-MCNC: 12.3 G/DL — LOW (ref 13–17)
IMM GRANULOCYTES NFR BLD AUTO: 0.3 % — SIGNIFICANT CHANGE UP (ref 0–1.5)
INR BLD: 1.83 — HIGH (ref 0.88–1.17)
LYMPHOCYTES # BLD AUTO: 1.22 K/UL — SIGNIFICANT CHANGE UP (ref 1–3.3)
LYMPHOCYTES # BLD AUTO: 16 % — SIGNIFICANT CHANGE UP (ref 13–44)
MAGNESIUM SERPL-MCNC: 2.1 MG/DL — SIGNIFICANT CHANGE UP (ref 1.6–2.6)
MCHC RBC-ENTMCNC: 28.5 PG — SIGNIFICANT CHANGE UP (ref 27–34)
MCHC RBC-ENTMCNC: 28.7 PG — SIGNIFICANT CHANGE UP (ref 27–34)
MCHC RBC-ENTMCNC: 30.6 % — LOW (ref 32–36)
MCHC RBC-ENTMCNC: 30.7 % — LOW (ref 32–36)
MCV RBC AUTO: 93.3 FL — SIGNIFICANT CHANGE UP (ref 80–100)
MCV RBC AUTO: 93.4 FL — SIGNIFICANT CHANGE UP (ref 80–100)
MONOCYTES # BLD AUTO: 1.56 K/UL — HIGH (ref 0–0.9)
MONOCYTES NFR BLD AUTO: 20.5 % — HIGH (ref 2–14)
NEUTROPHILS # BLD AUTO: 4.59 K/UL — SIGNIFICANT CHANGE UP (ref 1.8–7.4)
NEUTROPHILS NFR BLD AUTO: 60.3 % — SIGNIFICANT CHANGE UP (ref 43–77)
PLATELET # BLD AUTO: 108 K/UL — LOW (ref 150–400)
PLATELET # BLD AUTO: 113 K/UL — LOW (ref 150–400)
PMV BLD: 12.5 FL — SIGNIFICANT CHANGE UP (ref 7–13)
PMV BLD: SIGNIFICANT CHANGE UP FL (ref 7–13)
POTASSIUM SERPL-MCNC: 4.2 MMOL/L — SIGNIFICANT CHANGE UP (ref 3.5–5.3)
POTASSIUM SERPL-MCNC: 4.2 MMOL/L — SIGNIFICANT CHANGE UP (ref 3.5–5.3)
POTASSIUM SERPL-SCNC: 4.2 MMOL/L — SIGNIFICANT CHANGE UP (ref 3.5–5.3)
POTASSIUM SERPL-SCNC: 4.2 MMOL/L — SIGNIFICANT CHANGE UP (ref 3.5–5.3)
PROTHROM AB SERPL-ACNC: 20.7 SEC — HIGH (ref 9.8–13.1)
RBC # BLD: 4.25 M/UL — SIGNIFICANT CHANGE UP (ref 4.2–5.8)
RBC # BLD: 4.31 M/UL — SIGNIFICANT CHANGE UP (ref 4.2–5.8)
RBC # FLD: 18.6 % — HIGH (ref 10.3–14.5)
RBC # FLD: 18.6 % — HIGH (ref 10.3–14.5)
SODIUM SERPL-SCNC: 135 MMOL/L — SIGNIFICANT CHANGE UP (ref 135–145)
SODIUM SERPL-SCNC: 135 MMOL/L — SIGNIFICANT CHANGE UP (ref 135–145)
WBC # BLD: 7.61 K/UL — SIGNIFICANT CHANGE UP (ref 3.8–10.5)
WBC # BLD: 7.81 K/UL — SIGNIFICANT CHANGE UP (ref 3.8–10.5)
WBC # FLD AUTO: 7.61 K/UL — SIGNIFICANT CHANGE UP (ref 3.8–10.5)
WBC # FLD AUTO: 7.81 K/UL — SIGNIFICANT CHANGE UP (ref 3.8–10.5)

## 2017-04-23 RX ORDER — WARFARIN SODIUM 2.5 MG/1
4 TABLET ORAL ONCE
Qty: 0 | Refills: 0 | Status: COMPLETED | OUTPATIENT
Start: 2017-04-23 | End: 2017-04-23

## 2017-04-23 RX ADMIN — FINASTERIDE 5 MILLIGRAM(S): 5 TABLET, FILM COATED ORAL at 12:16

## 2017-04-23 RX ADMIN — SEVELAMER CARBONATE 800 MILLIGRAM(S): 2400 POWDER, FOR SUSPENSION ORAL at 12:16

## 2017-04-23 RX ADMIN — SEVELAMER CARBONATE 800 MILLIGRAM(S): 2400 POWDER, FOR SUSPENSION ORAL at 17:42

## 2017-04-23 RX ADMIN — Medication 100 MILLIGRAM(S): at 12:16

## 2017-04-23 RX ADMIN — Medication: at 12:16

## 2017-04-23 RX ADMIN — SEVELAMER CARBONATE 800 MILLIGRAM(S): 2400 POWDER, FOR SUSPENSION ORAL at 09:29

## 2017-04-23 RX ADMIN — MIDODRINE HYDROCHLORIDE 30 MILLIGRAM(S): 2.5 TABLET ORAL at 13:50

## 2017-04-23 RX ADMIN — Medication: at 09:29

## 2017-04-23 RX ADMIN — MIDODRINE HYDROCHLORIDE 30 MILLIGRAM(S): 2.5 TABLET ORAL at 21:58

## 2017-04-23 RX ADMIN — INSULIN GLARGINE 4 UNIT(S): 100 INJECTION, SOLUTION SUBCUTANEOUS at 21:58

## 2017-04-23 RX ADMIN — MIDODRINE HYDROCHLORIDE 30 MILLIGRAM(S): 2.5 TABLET ORAL at 05:13

## 2017-04-23 RX ADMIN — Medication: at 17:42

## 2017-04-23 RX ADMIN — WARFARIN SODIUM 4 MILLIGRAM(S): 2.5 TABLET ORAL at 17:42

## 2017-04-23 RX ADMIN — ATORVASTATIN CALCIUM 20 MILLIGRAM(S): 80 TABLET, FILM COATED ORAL at 21:58

## 2017-04-23 RX ADMIN — AMIODARONE HYDROCHLORIDE 200 MILLIGRAM(S): 400 TABLET ORAL at 05:13

## 2017-04-23 RX ADMIN — Medication 75 MICROGRAM(S): at 05:13

## 2017-04-24 LAB
BUN SERPL-MCNC: 60 MG/DL — HIGH (ref 7–23)
CALCIUM SERPL-MCNC: 9.3 MG/DL — SIGNIFICANT CHANGE UP (ref 8.4–10.5)
CHLORIDE SERPL-SCNC: 94 MMOL/L — LOW (ref 98–107)
CO2 SERPL-SCNC: 22 MMOL/L — SIGNIFICANT CHANGE UP (ref 22–31)
CREAT SERPL-MCNC: 5.98 MG/DL — HIGH (ref 0.5–1.3)
GLUCOSE SERPL-MCNC: 248 MG/DL — HIGH (ref 70–99)
HCT VFR BLD CALC: 39.4 % — SIGNIFICANT CHANGE UP (ref 39–50)
HGB BLD-MCNC: 12 G/DL — LOW (ref 13–17)
INR BLD: 2.11 — HIGH (ref 0.88–1.17)
MCHC RBC-ENTMCNC: 28.6 PG — SIGNIFICANT CHANGE UP (ref 27–34)
MCHC RBC-ENTMCNC: 30.5 % — LOW (ref 32–36)
MCV RBC AUTO: 93.8 FL — SIGNIFICANT CHANGE UP (ref 80–100)
PLATELET # BLD AUTO: 108 K/UL — LOW (ref 150–400)
PMV BLD: 12.3 FL — SIGNIFICANT CHANGE UP (ref 7–13)
POTASSIUM SERPL-MCNC: 4.6 MMOL/L — SIGNIFICANT CHANGE UP (ref 3.5–5.3)
POTASSIUM SERPL-SCNC: 4.6 MMOL/L — SIGNIFICANT CHANGE UP (ref 3.5–5.3)
PROTHROM AB SERPL-ACNC: 24 SEC — HIGH (ref 9.8–13.1)
RBC # BLD: 4.2 M/UL — SIGNIFICANT CHANGE UP (ref 4.2–5.8)
RBC # FLD: 18.4 % — HIGH (ref 10.3–14.5)
SODIUM SERPL-SCNC: 135 MMOL/L — SIGNIFICANT CHANGE UP (ref 135–145)
WBC # BLD: 5.5 K/UL — SIGNIFICANT CHANGE UP (ref 3.8–10.5)
WBC # FLD AUTO: 5.5 K/UL — SIGNIFICANT CHANGE UP (ref 3.8–10.5)

## 2017-04-24 PROCEDURE — 99232 SBSQ HOSP IP/OBS MODERATE 35: CPT

## 2017-04-24 RX ORDER — WARFARIN SODIUM 2.5 MG/1
3 TABLET ORAL ONCE
Qty: 0 | Refills: 0 | Status: COMPLETED | OUTPATIENT
Start: 2017-04-24 | End: 2017-04-24

## 2017-04-24 RX ADMIN — Medication 2: at 12:54

## 2017-04-24 RX ADMIN — FINASTERIDE 5 MILLIGRAM(S): 5 TABLET, FILM COATED ORAL at 12:54

## 2017-04-24 RX ADMIN — INSULIN GLARGINE 4 UNIT(S): 100 INJECTION, SOLUTION SUBCUTANEOUS at 22:00

## 2017-04-24 RX ADMIN — MIDODRINE HYDROCHLORIDE 30 MILLIGRAM(S): 2.5 TABLET ORAL at 22:00

## 2017-04-24 RX ADMIN — AMIODARONE HYDROCHLORIDE 200 MILLIGRAM(S): 400 TABLET ORAL at 05:17

## 2017-04-24 RX ADMIN — Medication 100 MILLIGRAM(S): at 12:54

## 2017-04-24 RX ADMIN — MIDODRINE HYDROCHLORIDE 30 MILLIGRAM(S): 2.5 TABLET ORAL at 05:17

## 2017-04-24 RX ADMIN — Medication 75 MICROGRAM(S): at 05:17

## 2017-04-24 RX ADMIN — SEVELAMER CARBONATE 800 MILLIGRAM(S): 2400 POWDER, FOR SUSPENSION ORAL at 08:55

## 2017-04-24 RX ADMIN — ATORVASTATIN CALCIUM 20 MILLIGRAM(S): 80 TABLET, FILM COATED ORAL at 22:00

## 2017-04-24 RX ADMIN — WARFARIN SODIUM 3 MILLIGRAM(S): 2.5 TABLET ORAL at 19:02

## 2017-04-24 RX ADMIN — SEVELAMER CARBONATE 800 MILLIGRAM(S): 2400 POWDER, FOR SUSPENSION ORAL at 12:54

## 2017-04-24 RX ADMIN — SEVELAMER CARBONATE 800 MILLIGRAM(S): 2400 POWDER, FOR SUSPENSION ORAL at 19:02

## 2017-04-24 RX ADMIN — Medication: at 08:55

## 2017-04-24 RX ADMIN — MIDODRINE HYDROCHLORIDE 30 MILLIGRAM(S): 2.5 TABLET ORAL at 13:46

## 2017-04-25 LAB
BUN SERPL-MCNC: 69 MG/DL — HIGH (ref 7–23)
CALCIUM SERPL-MCNC: 10 MG/DL — SIGNIFICANT CHANGE UP (ref 8.4–10.5)
CHLORIDE SERPL-SCNC: 95 MMOL/L — LOW (ref 98–107)
CO2 SERPL-SCNC: 18 MMOL/L — LOW (ref 22–31)
CREAT SERPL-MCNC: 6.55 MG/DL — HIGH (ref 0.5–1.3)
GLUCOSE SERPL-MCNC: 134 MG/DL — HIGH (ref 70–99)
HCT VFR BLD CALC: 40.7 % — SIGNIFICANT CHANGE UP (ref 39–50)
HGB BLD-MCNC: 12.7 G/DL — LOW (ref 13–17)
INR BLD: 2.33 — HIGH (ref 0.88–1.17)
MAGNESIUM SERPL-MCNC: 2.4 MG/DL — SIGNIFICANT CHANGE UP (ref 1.6–2.6)
MCHC RBC-ENTMCNC: 28.8 PG — SIGNIFICANT CHANGE UP (ref 27–34)
MCHC RBC-ENTMCNC: 31.2 % — LOW (ref 32–36)
MCV RBC AUTO: 92.3 FL — SIGNIFICANT CHANGE UP (ref 80–100)
PLATELET # BLD AUTO: 115 K/UL — LOW (ref 150–400)
PMV BLD: SIGNIFICANT CHANGE UP FL (ref 7–13)
POTASSIUM SERPL-MCNC: 4.9 MMOL/L — SIGNIFICANT CHANGE UP (ref 3.5–5.3)
POTASSIUM SERPL-SCNC: 4.9 MMOL/L — SIGNIFICANT CHANGE UP (ref 3.5–5.3)
PROTHROM AB SERPL-ACNC: 26.5 SEC — HIGH (ref 9.8–13.1)
RBC # BLD: 4.41 M/UL — SIGNIFICANT CHANGE UP (ref 4.2–5.8)
RBC # FLD: 18.5 % — HIGH (ref 10.3–14.5)
SODIUM SERPL-SCNC: 134 MMOL/L — LOW (ref 135–145)
WBC # BLD: 7.37 K/UL — SIGNIFICANT CHANGE UP (ref 3.8–10.5)
WBC # FLD AUTO: 7.37 K/UL — SIGNIFICANT CHANGE UP (ref 3.8–10.5)

## 2017-04-25 PROCEDURE — 93306 TTE W/DOPPLER COMPLETE: CPT | Mod: 26

## 2017-04-25 RX ORDER — WARFARIN SODIUM 2.5 MG/1
3 TABLET ORAL ONCE
Qty: 0 | Refills: 0 | Status: COMPLETED | OUTPATIENT
Start: 2017-04-25 | End: 2017-04-25

## 2017-04-25 RX ADMIN — MIDODRINE HYDROCHLORIDE 30 MILLIGRAM(S): 2.5 TABLET ORAL at 14:52

## 2017-04-25 RX ADMIN — AMIODARONE HYDROCHLORIDE 200 MILLIGRAM(S): 400 TABLET ORAL at 05:27

## 2017-04-25 RX ADMIN — INSULIN GLARGINE 4 UNIT(S): 100 INJECTION, SOLUTION SUBCUTANEOUS at 22:11

## 2017-04-25 RX ADMIN — SEVELAMER CARBONATE 800 MILLIGRAM(S): 2400 POWDER, FOR SUSPENSION ORAL at 17:44

## 2017-04-25 RX ADMIN — WARFARIN SODIUM 3 MILLIGRAM(S): 2.5 TABLET ORAL at 17:45

## 2017-04-25 RX ADMIN — Medication 100 MILLIGRAM(S): at 11:53

## 2017-04-25 RX ADMIN — SEVELAMER CARBONATE 800 MILLIGRAM(S): 2400 POWDER, FOR SUSPENSION ORAL at 10:18

## 2017-04-25 RX ADMIN — FINASTERIDE 5 MILLIGRAM(S): 5 TABLET, FILM COATED ORAL at 11:53

## 2017-04-25 RX ADMIN — MIDODRINE HYDROCHLORIDE 30 MILLIGRAM(S): 2.5 TABLET ORAL at 05:26

## 2017-04-25 RX ADMIN — SEVELAMER CARBONATE 800 MILLIGRAM(S): 2400 POWDER, FOR SUSPENSION ORAL at 11:53

## 2017-04-25 RX ADMIN — MIDODRINE HYDROCHLORIDE 30 MILLIGRAM(S): 2.5 TABLET ORAL at 14:30

## 2017-04-25 RX ADMIN — Medication 75 MICROGRAM(S): at 05:26

## 2017-04-25 RX ADMIN — ATORVASTATIN CALCIUM 20 MILLIGRAM(S): 80 TABLET, FILM COATED ORAL at 22:10

## 2017-04-25 NOTE — DISCHARGE NOTE ADULT - MEDICATION SUMMARY - MEDICATIONS TO CHANGE
I will SWITCH the dose or number of times a day I take the medications listed below when I get home from the hospital:  None I will SWITCH the dose or number of times a day I take the medications listed below when I get home from the hospital:    DOBUTamine  -- 3.006 microgram(s) intravenous IV continuous    warfarin 3 mg oral tablet  -- 1 tab(s) by mouth once a day

## 2017-04-25 NOTE — DISCHARGE NOTE ADULT - PLAN OF CARE
improve breathing low sodium diet  f/u with your cardiologist Continue with Warfarin based on INR Continue with Dialysis consistent carbohydrate diet, continue lantus 4 units remain euvolemic, medication compliance low sodium diet, 1500ml fluid restriction, continue medications as prescribed including Dobutamine infusion via PICCline, weekly lab results faxed to Dr. Davis  follow up with Dr. Daivs in 1 week Continue with Warfarin dosing daily based on INR, weekly INR, goal INR 2-3, continue amiodarone Continue with your normal schedule hemodialysis sessions in the community consistent carbohydrate diet, continue your home diabetic regimen Continue with your normal schedule hemodialysis sessions in the community, follow up with Dr. Barrios

## 2017-04-25 NOTE — DISCHARGE NOTE ADULT - HOSPITAL COURSE
60 y/o male with a PMHx of CAD, ischemic cardiomyopathy with severe LV dysfunction (EF 26%) S/P Biotonic ICD placement, atrial fibrillation (on Coumadin), COPD on home oxygen, ESRD (on HD T/R/S), HLD, DM, gout, chronic hypotension on Midodrine, recently admitted to [facility] for ADHF S/P Dobutamine gtt in CCU and discharged on 4/18, presents to ED for chest pain and shortness of breath    Hospital Course:  Echo, September 2011: Mild left atrial enlargement. Moderate left ventricular enlargement. Severe global left ventricular systolic dysfunction. Grossly normal right ventricular systolic function.  A device wire is noted in the right heart. EF 26%.  ----------  EKG: Atrial fibrillation at 77 bpm with variable AV block with PVCs  CE x2: Trop 0.15-->0.14, CK negative  CXR: Pulmonary edema  H/H: 12.3/39.6  Platelets: 132  Na: 128  BUN/Cr: 78/6.51  Glucose: 208  Alk phos: 233  4/18 Renal: HD today, need palliative evaluation   4/19 PT eval home PT   4/19 EP eval interrogation -  paced no events noted  4/19 heart failure team following rec inc HD   4/19 seen by cards Dr Roque  4/20 HF: Chronic systolic HF with ESRD fluid overload.  Continue HD/UF for now and continue aggressive fluid removal.  4/21 Palliative Note: Had discussion with patient about MSOF.  Will have meeting with family on Monday.  4/24 Renal: Ultrafiltrate  4/24 HF: continue HD, no further recommendations, advanced directives were discussed, will discontinue daily notes.  4/24 Cards: needs more fluid removal from HD  4/24 palliative: patient defers discussion about end of life care, asked about transplantaion, has not discussed advanced directives with his wife which we encourage. reconsult if needed  4/25Med: fluid removal wit HD,   4/25 Renal: daily HD has never been beneficial in past, HD today, prognosis grave  4/25 TTE: 1. Mild left atrial enlargement. LA volume index = 34  cc/m2.  2. Normal left ventricular internal dimensions and wall  thicknesses.  3. Severe global left ventricular systolic dysfunction.  4. Severe diastolic dysfunction.  5. Moderate right atrial enlargement.  6. A device wire is noted in the right heart. Right  ventricular enlargement with decreased right ventricular  systolic function.  7. Normal tricuspid valve. Moderate tricuspid  regurgitation.  8. Estimated pulmonary artery systolic pressure equals 30  mm Hg, assuming right atrial pressure equals 10  mm Hg,  consistent with normal pulmonary pressures. 60 y/o male with a PMHx of CAD, ischemic cardiomyopathy with severe LV dysfunction (EF 26%) S/P Biotonic ICD placement, atrial fibrillation (on Coumadin), COPD on home oxygen, ESRD (on HD T/R/S), HLD, DM, gout, chronic hypotension on Midodrine, recently admitted to [facility] for ADHF S/P Dobutamine gtt in CCU and discharged on , presents to ED for chest pain and shortness of breath    Hospital Course:  Echo, 2011: Mild left atrial enlargement. Moderate left ventricular enlargement. Severe global left ventricular systolic dysfunction. Grossly normal right ventricular systolic function.  A device wire is noted in the right heart. EF 26%.  ----------  EKG: Atrial fibrillation at 77 bpm with variable AV block with PVCs  CE x2: Trop 0.15-->0.14, CK negative  CXR: Pulmonary edema  H/H: 12.3/39.6  Platelets: 132  Na: 128  BUN/Cr: 78/6.51  Glucose: 208  Alk phos: 233   Renal: HD today, need palliative evaluation    PT eval home PT    EP eval interrogation -  paced no events noted   heart failure team following rec inc HD    seen by cards Dr Roque   HF: Chronic systolic HF with ESRD fluid overload.  Continue HD/UF for now and continue aggressive fluid removal.   Palliative Note: Had discussion with patient about MSOF.  Will have meeting with family on Monday.   Renal: Ultrafiltrate   HF: continue HD, no further recommendations, advanced directives were discussed, will discontinue daily notes.   Cards: needs more fluid removal from HD   palliative: patient defers discussion about end of life care, asked about transplantaion, has not discussed advanced directives with his wife which we encourage. reconsult if needed  Med: fluid removal wit HD,    Renal: daily HD has never been beneficial in past, HD today, prognosis grave   TTE: 1. Mild left atrial enlargement. LA volume index = 34  cc/m2.  2. Normal left ventricular internal dimensions and wall  thicknesses.  3. Severe global left ventricular systolic dysfunction.  4. Severe diastolic dysfunction.  5. Moderate right atrial enlargement.  6. A device wire is noted in the right heart. Right  ventricular enlargement with decreased right ventricular  systolic function.  7. Normal tricuspid valve. Moderate tricuspid  regurgitation.  8. Estimated pulmonary artery systolic pressure equals 30  mm Hg, assuming right atrial pressure equals 10  mm Hg,  consistent with normal pulmonary pressures.   Cards Consider adding    Med: D/W cards about    Renal daily HD   Renal: HD today as scheduled   Med: End stage cardiomyopathy, ESRD-on dobutamine, HD as per renal   Cards: cont dobutamine today and through the weekend, if clinically improved would place on cental tunnel catheter for long term administration   Renal: ESRD on HD -?trial of low dose ACE-I   Med: ESRD on HD as per renal; End stage CHF-dobut as per cards; central tunnel cath for long term dobut.   Cardio:  pt with decrease in PLTs count. Etiology unclear. Possibly related to ESRD if he continues to improve would place indwelling cath for long term dobutamine tx.   Patient stable for discharge home, outpatient f/u with Cardiologist Dr. Davis and Nephrologist Dr. Barrios.

## 2017-04-25 NOTE — DISCHARGE NOTE ADULT - MEDICATION SUMMARY - MEDICATIONS TO TAKE
I will START or STAY ON the medications listed below when I get home from the hospital:    dobutamine infusion   -- 500mg in dextrose 5% 250ml, infuse at 15.309ml/hr Dose rate: 5mcg/kg/min  -- Indication: For CHF (congestive heart failure)    finasteride 5 mg oral tablet  -- 1 tab(s) by mouth once a day  -- Indication: For BPH (benign prostatic hypertrophy)    amiodarone 200 mg oral tablet  -- 1 tab(s) by mouth once a day  -- Indication: For Atrial fibrillation    warfarin 3 mg oral tablet  -- 1 tab(s) by mouth once a day  -- Indication: For Atrial fibrillation    Lantus 100 units/mL subcutaneous solution  -- 4 unit(s) subcutaneous once a day (at bedtime)  -- Indication: For Type 2 diabetes mellitus without complication, with long-term current use of insulin    Lipitor 20 mg oral tablet  -- 1 tab(s) by mouth once a day  -- Indication: For Dyslipidemia    ferrous sulfate 325 mg oral tablet  -- 1 tab(s) by mouth once a day  -- Check with your doctor before becoming pregnant.  Do not chew, break, or crush.  May discolor urine or feces.    -- Indication: For Anemia     docusate sodium 100 mg oral capsule  -- 1 cap(s) by mouth once a day  -- Indication: For Stool softner     midodrine 10 mg oral tablet  -- 3 tab(s) by mouth every 8 hours  -- Indication: For hypotension     Renvela 800 mg oral tablet  -- 1 tab(s) by mouth 3 times a day (with meals)  -- Indication: For ESRD (end stage renal disease)    Synthroid 75 mcg (0.075 mg) oral tablet  -- 1 tab(s) by mouth once a day  -- Indication: For hypothyroidism I will START or STAY ON the medications listed below when I get home from the hospital:    dobutamine infusion   -- 500mg in dextrose 5% 250ml, infuse at 15.309ml/hr Dose rate: 5mcg/kg/min  -- Indication: For CHF (congestive heart failure)    finasteride 5 mg oral tablet  -- 1 tab(s) by mouth once a day  -- Indication: For BPH (benign prostatic hypertrophy)    amiodarone 200 mg oral tablet  -- 1 tab(s) by mouth once a day  -- Indication: For Atrial fibrillation    warfarin 2 mg oral tablet  -- 1 tab(s) by mouth once a day  -- Indication: For Atrial fibrillation    NovoLIN 70/30 subcutaneous suspension  -- 25 unit(s) subcutaneous once a day  -- Indication: For Type 2 diabetes mellitus without complication, with long-term current use of insulin    Lantus 100 units/mL subcutaneous solution  -- 4 unit(s) subcutaneous once a day (at bedtime)  -- Indication: For Type 2 diabetes mellitus without complication, with long-term current use of insulin    Lipitor 20 mg oral tablet  -- 1 tab(s) by mouth once a day  -- Indication: For Dyslipidemia    ferrous sulfate 325 mg oral tablet  -- 1 tab(s) by mouth once a day  -- Check with your doctor before becoming pregnant.  Do not chew, break, or crush.  May discolor urine or feces.    -- Indication: For Anemia     docusate sodium 100 mg oral capsule  -- 1 cap(s) by mouth once a day  -- Indication: For Stool softner     midodrine 10 mg oral tablet  -- 3 tab(s) by mouth every 8 hours  -- Indication: For hypotension     Renvela 800 mg oral tablet  -- 1 tab(s) by mouth 3 times a day (with meals)  -- Indication: For ESRD (end stage renal disease)    Synthroid 75 mcg (0.075 mg) oral tablet  -- 1 tab(s) by mouth once a day  -- Indication: For hypothyroidism

## 2017-04-25 NOTE — DISCHARGE NOTE ADULT - HOME CARE AGENCY
[facility] [phone]. RN to come to hospital to connect dobutamine drip.

## 2017-04-25 NOTE — DISCHARGE NOTE ADULT - SECONDARY DIAGNOSIS.
Atrial fibrillation ESRD (end stage renal disease) Type 2 diabetes mellitus without complication, with long-term current use of insulin

## 2017-04-25 NOTE — DISCHARGE NOTE ADULT - PATIENT PORTAL LINK FT
“You can access the Follow[facility] Patient Portal, offered by [facility], by registering with the following website: [email or web address]”

## 2017-04-25 NOTE — DISCHARGE NOTE ADULT - CARE PLAN
Principal Discharge DX:	CHF (congestive heart failure)  Goal:	improve breathing  Instructions for follow-up, activity and diet:	low sodium diet  f/u with your cardiologist  Secondary Diagnosis:	Atrial fibrillation  Instructions for follow-up, activity and diet:	Continue with Warfarin based on INR  Secondary Diagnosis:	ESRD (end stage renal disease)  Instructions for follow-up, activity and diet:	Continue with Dialysis  Secondary Diagnosis:	Type 2 diabetes mellitus without complication, with long-term current use of insulin Principal Discharge DX:	CHF (congestive heart failure)  Goal:	remain euvolemic, medication compliance  Instructions for follow-up, activity and diet:	low sodium diet, 1500ml fluid restriction, continue medications as prescribed including Dobutamine infusion via PICCline, weekly lab results faxed to Dr. Davis  follow up with Dr. Davis in 1 week  Secondary Diagnosis:	Atrial fibrillation  Instructions for follow-up, activity and diet:	Continue with Warfarin dosing daily based on INR, weekly INR, goal INR 2-3, continue amiodarone  Secondary Diagnosis:	ESRD (end stage renal disease)  Instructions for follow-up, activity and diet:	Continue with your normal schedule hemodialysis sessions in the community  Secondary Diagnosis:	Type 2 diabetes mellitus without complication, with long-term current use of insulin  Instructions for follow-up, activity and diet:	consistent carbohydrate diet, continue lantus 4 units Principal Discharge DX:	CHF (congestive heart failure)  Goal:	remain euvolemic, medication compliance  Instructions for follow-up, activity and diet:	low sodium diet, 1500ml fluid restriction, continue medications as prescribed including Dobutamine infusion via PICCline, weekly lab results faxed to Dr. Davis  follow up with Dr. Davis in 1 week  Secondary Diagnosis:	Atrial fibrillation  Instructions for follow-up, activity and diet:	Continue with Warfarin dosing daily based on INR, weekly INR, goal INR 2-3, continue amiodarone  Secondary Diagnosis:	ESRD (end stage renal disease)  Instructions for follow-up, activity and diet:	Continue with your normal schedule hemodialysis sessions in the community, follow up with Dr. Barrios  Secondary Diagnosis:	Type 2 diabetes mellitus without complication, with long-term current use of insulin  Instructions for follow-up, activity and diet:	consistent carbohydrate diet, continue your home diabetic regimen

## 2017-04-25 NOTE — DISCHARGE NOTE ADULT - CARE PROVIDER_API CALL
Thaddeus Davis), Cardiovascular Disease; Internal Medicine  [address] [id number]  Phone: [phone]  Fax: [phone] Thaddeus Davis (MD), Cardiovascular Disease; Internal Medicine  [address] [facility] [address]  [place], [place] [id number]  Phone: [phone]  Fax: [phone]    Christopher Barrios (MD), Internal Medicine; Nephrology  [address] [facility] [address]  [place] [address]  Phone: [phone]  Fax: [phone]

## 2017-04-25 NOTE — DISCHARGE NOTE ADULT - ADDITIONAL INSTRUCTIONS
follow up with Dr. Davis in 1 week  have INR dosed within 1 week  follow up with Dr. Barrios your Nephrologist

## 2017-04-25 NOTE — DISCHARGE NOTE ADULT - MEDICATION SUMMARY - MEDICATIONS TO STOP TAKING
I will STOP taking the medications listed below when I get home from the hospital:    NovoLIN 70/30 subcutaneous suspension  -- 25 unit(s) subcutaneous once a day    Coumadin 2 mg oral tablet  -- 1 tab(s) by mouth once a day  -- Do not take this drug if you are pregnant.  It is very important that you take or use this exactly as directed.  Do not skip doses or discontinue unless directed by your doctor.  Obtain medical advice before taking any non-prescription drugs as some may affect the action of this medication. I will STOP taking the medications listed below when I get home from the hospital:    NovoLIN 70/30 subcutaneous suspension  -- 25 unit(s) subcutaneous once a day    DOBUTamine  -- 3.006 microgram(s) intravenous IV continuous    Coumadin 2 mg oral tablet  -- 1 tab(s) by mouth once a day  -- Do not take this drug if you are pregnant.  It is very important that you take or use this exactly as directed.  Do not skip doses or discontinue unless directed by your doctor.  Obtain medical advice before taking any non-prescription drugs as some may affect the action of this medication. I will STOP taking the medications listed below when I get home from the hospital:  None

## 2017-04-26 LAB
BUN SERPL-MCNC: 52 MG/DL — HIGH (ref 7–23)
CALCIUM SERPL-MCNC: 8.9 MG/DL — SIGNIFICANT CHANGE UP (ref 8.4–10.5)
CHLORIDE SERPL-SCNC: 95 MMOL/L — LOW (ref 98–107)
CO2 SERPL-SCNC: 24 MMOL/L — SIGNIFICANT CHANGE UP (ref 22–31)
CORTIS SERPL-MCNC: 14.4 UG/DL — SIGNIFICANT CHANGE UP (ref 2.7–18.4)
CREAT SERPL-MCNC: 5.77 MG/DL — HIGH (ref 0.5–1.3)
GLUCOSE SERPL-MCNC: 184 MG/DL — HIGH (ref 70–99)
HCT VFR BLD CALC: 38.2 % — LOW (ref 39–50)
HGB BLD-MCNC: 11.7 G/DL — LOW (ref 13–17)
INR BLD: 3.21 — HIGH (ref 0.88–1.17)
MAGNESIUM SERPL-MCNC: 2.3 MG/DL — SIGNIFICANT CHANGE UP (ref 1.6–2.6)
MCHC RBC-ENTMCNC: 28.5 PG — SIGNIFICANT CHANGE UP (ref 27–34)
MCHC RBC-ENTMCNC: 30.6 % — LOW (ref 32–36)
MCV RBC AUTO: 92.9 FL — SIGNIFICANT CHANGE UP (ref 80–100)
PLATELET # BLD AUTO: 101 K/UL — LOW (ref 150–400)
PMV BLD: SIGNIFICANT CHANGE UP FL (ref 7–13)
POTASSIUM SERPL-MCNC: 4.3 MMOL/L — SIGNIFICANT CHANGE UP (ref 3.5–5.3)
POTASSIUM SERPL-SCNC: 4.3 MMOL/L — SIGNIFICANT CHANGE UP (ref 3.5–5.3)
PROTHROM AB SERPL-ACNC: 36.8 SEC — HIGH (ref 9.8–13.1)
RBC # BLD: 4.11 M/UL — LOW (ref 4.2–5.8)
RBC # FLD: 18.6 % — HIGH (ref 10.3–14.5)
SODIUM SERPL-SCNC: 136 MMOL/L — SIGNIFICANT CHANGE UP (ref 135–145)
TSH SERPL-MCNC: 4.75 UIU/ML — HIGH (ref 0.27–4.2)
WBC # BLD: 7.5 K/UL — SIGNIFICANT CHANGE UP (ref 3.8–10.5)
WBC # FLD AUTO: 7.5 K/UL — SIGNIFICANT CHANGE UP (ref 3.8–10.5)

## 2017-04-26 RX ORDER — DOBUTAMINE HCL 250MG/20ML
5 VIAL (ML) INTRAVENOUS
Qty: 500 | Refills: 0 | Status: DISCONTINUED | OUTPATIENT
Start: 2017-04-26 | End: 2017-05-01

## 2017-04-26 RX ADMIN — Medication 15.31 MICROGRAM(S)/KG/MIN: at 15:53

## 2017-04-26 RX ADMIN — MIDODRINE HYDROCHLORIDE 30 MILLIGRAM(S): 2.5 TABLET ORAL at 05:42

## 2017-04-26 RX ADMIN — MIDODRINE HYDROCHLORIDE 30 MILLIGRAM(S): 2.5 TABLET ORAL at 22:09

## 2017-04-26 RX ADMIN — SEVELAMER CARBONATE 800 MILLIGRAM(S): 2400 POWDER, FOR SUSPENSION ORAL at 15:23

## 2017-04-26 RX ADMIN — Medication: at 11:17

## 2017-04-26 RX ADMIN — Medication: at 17:43

## 2017-04-26 RX ADMIN — Medication 75 MICROGRAM(S): at 05:42

## 2017-04-26 RX ADMIN — MIDODRINE HYDROCHLORIDE 30 MILLIGRAM(S): 2.5 TABLET ORAL at 12:55

## 2017-04-26 RX ADMIN — FINASTERIDE 5 MILLIGRAM(S): 5 TABLET, FILM COATED ORAL at 13:12

## 2017-04-26 RX ADMIN — INSULIN GLARGINE 4 UNIT(S): 100 INJECTION, SOLUTION SUBCUTANEOUS at 22:10

## 2017-04-26 RX ADMIN — SEVELAMER CARBONATE 800 MILLIGRAM(S): 2400 POWDER, FOR SUSPENSION ORAL at 17:23

## 2017-04-26 RX ADMIN — Medication: at 08:41

## 2017-04-26 RX ADMIN — AMIODARONE HYDROCHLORIDE 200 MILLIGRAM(S): 400 TABLET ORAL at 05:42

## 2017-04-26 RX ADMIN — ATORVASTATIN CALCIUM 20 MILLIGRAM(S): 80 TABLET, FILM COATED ORAL at 22:09

## 2017-04-26 RX ADMIN — Medication 100 MILLIGRAM(S): at 13:12

## 2017-04-27 LAB
BUN SERPL-MCNC: 63 MG/DL — HIGH (ref 7–23)
CALCIUM SERPL-MCNC: 9.3 MG/DL — SIGNIFICANT CHANGE UP (ref 8.4–10.5)
CHLORIDE SERPL-SCNC: 95 MMOL/L — LOW (ref 98–107)
CO2 SERPL-SCNC: 22 MMOL/L — SIGNIFICANT CHANGE UP (ref 22–31)
CREAT SERPL-MCNC: 6.42 MG/DL — HIGH (ref 0.5–1.3)
GLUCOSE SERPL-MCNC: 193 MG/DL — HIGH (ref 70–99)
HCT VFR BLD CALC: 36.2 % — LOW (ref 39–50)
HGB BLD-MCNC: 11.1 G/DL — LOW (ref 13–17)
INR BLD: 3.11 — HIGH (ref 0.88–1.17)
MAGNESIUM SERPL-MCNC: 2.3 MG/DL — SIGNIFICANT CHANGE UP (ref 1.6–2.6)
MCHC RBC-ENTMCNC: 28.4 PG — SIGNIFICANT CHANGE UP (ref 27–34)
MCHC RBC-ENTMCNC: 30.7 % — LOW (ref 32–36)
MCV RBC AUTO: 92.6 FL — SIGNIFICANT CHANGE UP (ref 80–100)
PLATELET # BLD AUTO: 94 K/UL — LOW (ref 150–400)
PMV BLD: 12.2 FL — SIGNIFICANT CHANGE UP (ref 7–13)
POTASSIUM SERPL-MCNC: 4.7 MMOL/L — SIGNIFICANT CHANGE UP (ref 3.5–5.3)
POTASSIUM SERPL-SCNC: 4.7 MMOL/L — SIGNIFICANT CHANGE UP (ref 3.5–5.3)
PROTHROM AB SERPL-ACNC: 35.7 SEC — HIGH (ref 9.8–13.1)
RBC # BLD: 3.91 M/UL — LOW (ref 4.2–5.8)
RBC # FLD: 18.6 % — HIGH (ref 10.3–14.5)
SODIUM SERPL-SCNC: 135 MMOL/L — SIGNIFICANT CHANGE UP (ref 135–145)
WBC # BLD: 8.36 K/UL — SIGNIFICANT CHANGE UP (ref 3.8–10.5)
WBC # FLD AUTO: 8.36 K/UL — SIGNIFICANT CHANGE UP (ref 3.8–10.5)

## 2017-04-27 RX ADMIN — Medication 1: at 22:07

## 2017-04-27 RX ADMIN — Medication: at 10:07

## 2017-04-27 RX ADMIN — MIDODRINE HYDROCHLORIDE 30 MILLIGRAM(S): 2.5 TABLET ORAL at 12:27

## 2017-04-27 RX ADMIN — Medication 15.31 MICROGRAM(S)/KG/MIN: at 22:10

## 2017-04-27 RX ADMIN — FINASTERIDE 5 MILLIGRAM(S): 5 TABLET, FILM COATED ORAL at 14:34

## 2017-04-27 RX ADMIN — AMIODARONE HYDROCHLORIDE 200 MILLIGRAM(S): 400 TABLET ORAL at 05:01

## 2017-04-27 RX ADMIN — Medication 75 MICROGRAM(S): at 05:01

## 2017-04-27 RX ADMIN — SEVELAMER CARBONATE 800 MILLIGRAM(S): 2400 POWDER, FOR SUSPENSION ORAL at 16:59

## 2017-04-27 RX ADMIN — Medication: at 18:17

## 2017-04-27 RX ADMIN — MIDODRINE HYDROCHLORIDE 30 MILLIGRAM(S): 2.5 TABLET ORAL at 05:01

## 2017-04-27 RX ADMIN — SEVELAMER CARBONATE 800 MILLIGRAM(S): 2400 POWDER, FOR SUSPENSION ORAL at 12:27

## 2017-04-27 RX ADMIN — INSULIN GLARGINE 4 UNIT(S): 100 INJECTION, SOLUTION SUBCUTANEOUS at 22:07

## 2017-04-27 RX ADMIN — ATORVASTATIN CALCIUM 20 MILLIGRAM(S): 80 TABLET, FILM COATED ORAL at 22:06

## 2017-04-27 RX ADMIN — MIDODRINE HYDROCHLORIDE 30 MILLIGRAM(S): 2.5 TABLET ORAL at 22:06

## 2017-04-28 LAB
BUN SERPL-MCNC: 50 MG/DL — HIGH (ref 7–23)
CALCIUM SERPL-MCNC: 9.3 MG/DL — SIGNIFICANT CHANGE UP (ref 8.4–10.5)
CHLORIDE SERPL-SCNC: 97 MMOL/L — LOW (ref 98–107)
CO2 SERPL-SCNC: 22 MMOL/L — SIGNIFICANT CHANGE UP (ref 22–31)
CREAT SERPL-MCNC: 5.77 MG/DL — HIGH (ref 0.5–1.3)
GLUCOSE SERPL-MCNC: 173 MG/DL — HIGH (ref 70–99)
HCT VFR BLD CALC: 37.8 % — LOW (ref 39–50)
HGB BLD-MCNC: 11.5 G/DL — LOW (ref 13–17)
INR BLD: 2.52 — HIGH (ref 0.88–1.17)
MAGNESIUM SERPL-MCNC: 2.1 MG/DL — SIGNIFICANT CHANGE UP (ref 1.6–2.6)
MCHC RBC-ENTMCNC: 28.3 PG — SIGNIFICANT CHANGE UP (ref 27–34)
MCHC RBC-ENTMCNC: 30.4 % — LOW (ref 32–36)
MCV RBC AUTO: 92.9 FL — SIGNIFICANT CHANGE UP (ref 80–100)
PLATELET # BLD AUTO: 90 K/UL — LOW (ref 150–400)
PMV BLD: 12.1 FL — SIGNIFICANT CHANGE UP (ref 7–13)
POTASSIUM SERPL-MCNC: 4.7 MMOL/L — SIGNIFICANT CHANGE UP (ref 3.5–5.3)
POTASSIUM SERPL-SCNC: 4.7 MMOL/L — SIGNIFICANT CHANGE UP (ref 3.5–5.3)
PROTHROM AB SERPL-ACNC: 28.8 SEC — HIGH (ref 9.8–13.1)
RBC # BLD: 4.07 M/UL — LOW (ref 4.2–5.8)
RBC # FLD: 18.6 % — HIGH (ref 10.3–14.5)
SODIUM SERPL-SCNC: 136 MMOL/L — SIGNIFICANT CHANGE UP (ref 135–145)
WBC # BLD: 7.09 K/UL — SIGNIFICANT CHANGE UP (ref 3.8–10.5)
WBC # FLD AUTO: 7.09 K/UL — SIGNIFICANT CHANGE UP (ref 3.8–10.5)

## 2017-04-28 RX ORDER — WARFARIN SODIUM 2.5 MG/1
2 TABLET ORAL ONCE
Qty: 0 | Refills: 0 | Status: COMPLETED | OUTPATIENT
Start: 2017-04-28 | End: 2017-04-28

## 2017-04-28 RX ORDER — WARFARIN SODIUM 2.5 MG/1
2 TABLET ORAL ONCE
Qty: 0 | Refills: 0 | Status: DISCONTINUED | OUTPATIENT
Start: 2017-04-28 | End: 2017-04-28

## 2017-04-28 RX ADMIN — MIDODRINE HYDROCHLORIDE 30 MILLIGRAM(S): 2.5 TABLET ORAL at 21:28

## 2017-04-28 RX ADMIN — INSULIN GLARGINE 4 UNIT(S): 100 INJECTION, SOLUTION SUBCUTANEOUS at 21:29

## 2017-04-28 RX ADMIN — Medication 15.31 MICROGRAM(S)/KG/MIN: at 09:13

## 2017-04-28 RX ADMIN — Medication 1: at 11:29

## 2017-04-28 RX ADMIN — FINASTERIDE 5 MILLIGRAM(S): 5 TABLET, FILM COATED ORAL at 11:28

## 2017-04-28 RX ADMIN — AMIODARONE HYDROCHLORIDE 200 MILLIGRAM(S): 400 TABLET ORAL at 05:23

## 2017-04-28 RX ADMIN — MIDODRINE HYDROCHLORIDE 30 MILLIGRAM(S): 2.5 TABLET ORAL at 05:23

## 2017-04-28 RX ADMIN — SEVELAMER CARBONATE 800 MILLIGRAM(S): 2400 POWDER, FOR SUSPENSION ORAL at 11:28

## 2017-04-28 RX ADMIN — ATORVASTATIN CALCIUM 20 MILLIGRAM(S): 80 TABLET, FILM COATED ORAL at 21:28

## 2017-04-28 RX ADMIN — SEVELAMER CARBONATE 800 MILLIGRAM(S): 2400 POWDER, FOR SUSPENSION ORAL at 09:11

## 2017-04-28 RX ADMIN — Medication 75 MICROGRAM(S): at 05:23

## 2017-04-28 RX ADMIN — Medication 2: at 17:22

## 2017-04-28 RX ADMIN — SEVELAMER CARBONATE 800 MILLIGRAM(S): 2400 POWDER, FOR SUSPENSION ORAL at 17:23

## 2017-04-28 RX ADMIN — MIDODRINE HYDROCHLORIDE 30 MILLIGRAM(S): 2.5 TABLET ORAL at 13:47

## 2017-04-28 RX ADMIN — WARFARIN SODIUM 2 MILLIGRAM(S): 2.5 TABLET ORAL at 17:23

## 2017-04-28 RX ADMIN — Medication 100 MILLIGRAM(S): at 11:28

## 2017-04-29 LAB
BUN SERPL-MCNC: 59 MG/DL — HIGH (ref 7–23)
CALCIUM SERPL-MCNC: 9.4 MG/DL — SIGNIFICANT CHANGE UP (ref 8.4–10.5)
CHLORIDE SERPL-SCNC: 95 MMOL/L — LOW (ref 98–107)
CO2 SERPL-SCNC: 22 MMOL/L — SIGNIFICANT CHANGE UP (ref 22–31)
CREAT SERPL-MCNC: 6.8 MG/DL — HIGH (ref 0.5–1.3)
GLUCOSE SERPL-MCNC: 121 MG/DL — HIGH (ref 70–99)
HCT VFR BLD CALC: 36.5 % — LOW (ref 39–50)
HGB BLD-MCNC: 11.1 G/DL — LOW (ref 13–17)
INR BLD: 2.22 — HIGH (ref 0.88–1.17)
MAGNESIUM SERPL-MCNC: 2.3 MG/DL — SIGNIFICANT CHANGE UP (ref 1.6–2.6)
MCHC RBC-ENTMCNC: 28.2 PG — SIGNIFICANT CHANGE UP (ref 27–34)
MCHC RBC-ENTMCNC: 30.4 % — LOW (ref 32–36)
MCV RBC AUTO: 92.9 FL — SIGNIFICANT CHANGE UP (ref 80–100)
PLATELET # BLD AUTO: 79 K/UL — LOW (ref 150–400)
PMV BLD: 12.1 FL — SIGNIFICANT CHANGE UP (ref 7–13)
POTASSIUM SERPL-MCNC: 5.1 MMOL/L — SIGNIFICANT CHANGE UP (ref 3.5–5.3)
POTASSIUM SERPL-SCNC: 5.1 MMOL/L — SIGNIFICANT CHANGE UP (ref 3.5–5.3)
PROTHROM AB SERPL-ACNC: 25.3 SEC — HIGH (ref 9.8–13.1)
RBC # BLD: 3.93 M/UL — LOW (ref 4.2–5.8)
RBC # FLD: 18.6 % — HIGH (ref 10.3–14.5)
SODIUM SERPL-SCNC: 134 MMOL/L — LOW (ref 135–145)
WBC # BLD: 6.35 K/UL — SIGNIFICANT CHANGE UP (ref 3.8–10.5)
WBC # FLD AUTO: 6.35 K/UL — SIGNIFICANT CHANGE UP (ref 3.8–10.5)

## 2017-04-29 RX ORDER — WARFARIN SODIUM 2.5 MG/1
3 TABLET ORAL ONCE
Qty: 0 | Refills: 0 | Status: COMPLETED | OUTPATIENT
Start: 2017-04-29 | End: 2017-04-29

## 2017-04-29 RX ADMIN — Medication 75 MICROGRAM(S): at 05:50

## 2017-04-29 RX ADMIN — WARFARIN SODIUM 3 MILLIGRAM(S): 2.5 TABLET ORAL at 17:20

## 2017-04-29 RX ADMIN — INSULIN GLARGINE 4 UNIT(S): 100 INJECTION, SOLUTION SUBCUTANEOUS at 22:49

## 2017-04-29 RX ADMIN — Medication 15.31 MICROGRAM(S)/KG/MIN: at 05:50

## 2017-04-29 RX ADMIN — FINASTERIDE 5 MILLIGRAM(S): 5 TABLET, FILM COATED ORAL at 13:41

## 2017-04-29 RX ADMIN — Medication 1: at 17:15

## 2017-04-29 RX ADMIN — MIDODRINE HYDROCHLORIDE 30 MILLIGRAM(S): 2.5 TABLET ORAL at 05:50

## 2017-04-29 RX ADMIN — MIDODRINE HYDROCHLORIDE 30 MILLIGRAM(S): 2.5 TABLET ORAL at 21:14

## 2017-04-29 RX ADMIN — AMIODARONE HYDROCHLORIDE 200 MILLIGRAM(S): 400 TABLET ORAL at 05:50

## 2017-04-29 RX ADMIN — Medication 15.31 MICROGRAM(S)/KG/MIN: at 09:24

## 2017-04-29 RX ADMIN — MIDODRINE HYDROCHLORIDE 30 MILLIGRAM(S): 2.5 TABLET ORAL at 13:41

## 2017-04-29 RX ADMIN — SEVELAMER CARBONATE 800 MILLIGRAM(S): 2400 POWDER, FOR SUSPENSION ORAL at 13:41

## 2017-04-29 RX ADMIN — Medication 100 MILLIGRAM(S): at 13:41

## 2017-04-29 RX ADMIN — SEVELAMER CARBONATE 800 MILLIGRAM(S): 2400 POWDER, FOR SUSPENSION ORAL at 09:24

## 2017-04-29 RX ADMIN — SEVELAMER CARBONATE 800 MILLIGRAM(S): 2400 POWDER, FOR SUSPENSION ORAL at 17:16

## 2017-04-29 RX ADMIN — ATORVASTATIN CALCIUM 20 MILLIGRAM(S): 80 TABLET, FILM COATED ORAL at 21:14

## 2017-04-30 LAB
BUN SERPL-MCNC: 38 MG/DL — HIGH (ref 7–23)
CALCIUM SERPL-MCNC: 9.4 MG/DL — SIGNIFICANT CHANGE UP (ref 8.4–10.5)
CHLORIDE SERPL-SCNC: 97 MMOL/L — LOW (ref 98–107)
CO2 SERPL-SCNC: 22 MMOL/L — SIGNIFICANT CHANGE UP (ref 22–31)
CREAT SERPL-MCNC: 4.98 MG/DL — HIGH (ref 0.5–1.3)
GLUCOSE SERPL-MCNC: 128 MG/DL — HIGH (ref 70–99)
HCT VFR BLD CALC: 37.1 % — LOW (ref 39–50)
HGB BLD-MCNC: 11.2 G/DL — LOW (ref 13–17)
INR BLD: 2.07 — HIGH (ref 0.88–1.17)
MAGNESIUM SERPL-MCNC: 2.2 MG/DL — SIGNIFICANT CHANGE UP (ref 1.6–2.6)
MCHC RBC-ENTMCNC: 28.9 PG — SIGNIFICANT CHANGE UP (ref 27–34)
MCHC RBC-ENTMCNC: 30.2 % — LOW (ref 32–36)
MCV RBC AUTO: 95.6 FL — SIGNIFICANT CHANGE UP (ref 80–100)
PLATELET # BLD AUTO: 64 K/UL — LOW (ref 150–400)
PMV BLD: 11.4 FL — SIGNIFICANT CHANGE UP (ref 7–13)
POTASSIUM SERPL-MCNC: 5.1 MMOL/L — SIGNIFICANT CHANGE UP (ref 3.5–5.3)
POTASSIUM SERPL-SCNC: 5.1 MMOL/L — SIGNIFICANT CHANGE UP (ref 3.5–5.3)
PROTHROM AB SERPL-ACNC: 23.5 SEC — HIGH (ref 9.8–13.1)
RBC # BLD: 3.88 M/UL — LOW (ref 4.2–5.8)
RBC # FLD: 19 % — HIGH (ref 10.3–14.5)
SODIUM SERPL-SCNC: 135 MMOL/L — SIGNIFICANT CHANGE UP (ref 135–145)
WBC # BLD: 6.48 K/UL — SIGNIFICANT CHANGE UP (ref 3.8–10.5)
WBC # FLD AUTO: 6.48 K/UL — SIGNIFICANT CHANGE UP (ref 3.8–10.5)

## 2017-04-30 RX ORDER — WARFARIN SODIUM 2.5 MG/1
3 TABLET ORAL ONCE
Qty: 0 | Refills: 0 | Status: COMPLETED | OUTPATIENT
Start: 2017-04-30 | End: 2017-04-30

## 2017-04-30 RX ADMIN — INSULIN GLARGINE 4 UNIT(S): 100 INJECTION, SOLUTION SUBCUTANEOUS at 21:51

## 2017-04-30 RX ADMIN — MIDODRINE HYDROCHLORIDE 30 MILLIGRAM(S): 2.5 TABLET ORAL at 14:27

## 2017-04-30 RX ADMIN — ATORVASTATIN CALCIUM 20 MILLIGRAM(S): 80 TABLET, FILM COATED ORAL at 21:51

## 2017-04-30 RX ADMIN — WARFARIN SODIUM 3 MILLIGRAM(S): 2.5 TABLET ORAL at 17:30

## 2017-04-30 RX ADMIN — MIDODRINE HYDROCHLORIDE 30 MILLIGRAM(S): 2.5 TABLET ORAL at 05:14

## 2017-04-30 RX ADMIN — SEVELAMER CARBONATE 800 MILLIGRAM(S): 2400 POWDER, FOR SUSPENSION ORAL at 11:56

## 2017-04-30 RX ADMIN — MIDODRINE HYDROCHLORIDE 30 MILLIGRAM(S): 2.5 TABLET ORAL at 21:51

## 2017-04-30 RX ADMIN — Medication 1: at 11:55

## 2017-04-30 RX ADMIN — Medication 100 MILLIGRAM(S): at 11:56

## 2017-04-30 RX ADMIN — Medication 15.31 MICROGRAM(S)/KG/MIN: at 09:00

## 2017-04-30 RX ADMIN — FINASTERIDE 5 MILLIGRAM(S): 5 TABLET, FILM COATED ORAL at 11:56

## 2017-04-30 RX ADMIN — Medication 75 MICROGRAM(S): at 05:14

## 2017-04-30 RX ADMIN — Medication 1: at 08:58

## 2017-04-30 RX ADMIN — SEVELAMER CARBONATE 800 MILLIGRAM(S): 2400 POWDER, FOR SUSPENSION ORAL at 17:30

## 2017-04-30 RX ADMIN — AMIODARONE HYDROCHLORIDE 200 MILLIGRAM(S): 400 TABLET ORAL at 05:14

## 2017-04-30 RX ADMIN — Medication: at 17:30

## 2017-04-30 RX ADMIN — SEVELAMER CARBONATE 800 MILLIGRAM(S): 2400 POWDER, FOR SUSPENSION ORAL at 08:58

## 2017-05-01 VITALS
RESPIRATION RATE: 18 BRPM | SYSTOLIC BLOOD PRESSURE: 97 MMHG | DIASTOLIC BLOOD PRESSURE: 62 MMHG | TEMPERATURE: 98 F | OXYGEN SATURATION: 93 % | HEART RATE: 87 BPM

## 2017-05-01 PROCEDURE — 86334 IMMUNOFIX E-PHORESIS SERUM: CPT | Mod: 26

## 2017-05-01 PROCEDURE — 36569 INSJ PICC 5 YR+ W/O IMAGING: CPT

## 2017-05-01 PROCEDURE — 76937 US GUIDE VASCULAR ACCESS: CPT | Mod: 26

## 2017-05-01 PROCEDURE — 77001 FLUOROGUIDE FOR VEIN DEVICE: CPT | Mod: 26,GC

## 2017-05-01 RX ORDER — FERROUS SULFATE 325(65) MG
325 TABLET ORAL DAILY
Qty: 0 | Refills: 0 | Status: DISCONTINUED | OUTPATIENT
Start: 2017-05-01 | End: 2017-05-01

## 2017-05-01 RX ORDER — WARFARIN SODIUM 2.5 MG/1
1 TABLET ORAL
Qty: 30 | Refills: 0 | OUTPATIENT
Start: 2017-05-01 | End: 2017-05-31

## 2017-05-01 RX ORDER — WARFARIN SODIUM 2.5 MG/1
2 TABLET ORAL ONCE
Qty: 0 | Refills: 0 | Status: COMPLETED | OUTPATIENT
Start: 2017-05-01 | End: 2017-05-01

## 2017-05-01 RX ORDER — DOBUTAMINE HCL 250MG/20ML
0 VIAL (ML) INTRAVENOUS
Qty: 1 | Refills: 0 | OUTPATIENT
Start: 2017-05-01

## 2017-05-01 RX ORDER — DOCUSATE SODIUM 100 MG
1 CAPSULE ORAL
Qty: 0 | Refills: 0 | COMMUNITY
Start: 2017-05-01

## 2017-05-01 RX ORDER — FERROUS SULFATE 325(65) MG
1 TABLET ORAL
Qty: 30 | Refills: 0 | OUTPATIENT
Start: 2017-05-01 | End: 2017-05-31

## 2017-05-01 RX ADMIN — MIDODRINE HYDROCHLORIDE 30 MILLIGRAM(S): 2.5 TABLET ORAL at 05:21

## 2017-05-01 RX ADMIN — FINASTERIDE 5 MILLIGRAM(S): 5 TABLET, FILM COATED ORAL at 12:47

## 2017-05-01 RX ADMIN — Medication 75 MICROGRAM(S): at 05:21

## 2017-05-01 RX ADMIN — AMIODARONE HYDROCHLORIDE 200 MILLIGRAM(S): 400 TABLET ORAL at 05:21

## 2017-05-01 RX ADMIN — Medication 325 MILLIGRAM(S): at 12:48

## 2017-05-01 RX ADMIN — Medication 100 MILLIGRAM(S): at 12:48

## 2017-05-01 RX ADMIN — SEVELAMER CARBONATE 800 MILLIGRAM(S): 2400 POWDER, FOR SUSPENSION ORAL at 08:34

## 2017-05-01 RX ADMIN — Medication 15.31 MICROGRAM(S)/KG/MIN: at 05:23

## 2017-05-01 RX ADMIN — MIDODRINE HYDROCHLORIDE 30 MILLIGRAM(S): 2.5 TABLET ORAL at 14:29

## 2017-05-01 RX ADMIN — SEVELAMER CARBONATE 800 MILLIGRAM(S): 2400 POWDER, FOR SUSPENSION ORAL at 12:48

## 2017-05-01 RX ADMIN — WARFARIN SODIUM 2 MILLIGRAM(S): 2.5 TABLET ORAL at 17:54

## 2017-05-01 RX ADMIN — Medication 15.31 MICROGRAM(S)/KG/MIN: at 10:16

## 2017-05-01 RX ADMIN — SEVELAMER CARBONATE 800 MILLIGRAM(S): 2400 POWDER, FOR SUSPENSION ORAL at 17:54

## 2017-05-03 LAB
CARDIOLIPIN IGM SER-MCNC: 14.91 GPL — SIGNIFICANT CHANGE UP (ref 0–23)
CARDIOLIPIN IGM SER-MCNC: 14.91 GPL — SIGNIFICANT CHANGE UP (ref 0–23)
CARDIOLIPIN IGM SER-MCNC: 9.08 MPL — SIGNIFICANT CHANGE UP (ref 0–11)
CARDIOLIPIN IGM SER-MCNC: 9.08 MPL — SIGNIFICANT CHANGE UP (ref 0–11)

## 2017-05-04 LAB — GAS PNL BLDMV: SIGNIFICANT CHANGE UP

## 2017-05-05 LAB — B2 GLYCOPROT1 AB SER QL: POSITIVE — SIGNIFICANT CHANGE UP

## 2017-05-09 ENCOUNTER — INPATIENT (INPATIENT)
Facility: HOSPITAL | Age: 62
LOS: 44 days | Discharge: HOME HEALTH | End: 2017-06-23
Attending: INTERNAL MEDICINE | Admitting: INTERNAL MEDICINE
Payer: COMMERCIAL

## 2017-05-09 VITALS
SYSTOLIC BLOOD PRESSURE: 85 MMHG | DIASTOLIC BLOOD PRESSURE: 49 MMHG | HEART RATE: 95 BPM | RESPIRATION RATE: 16 BRPM | TEMPERATURE: 98 F | OXYGEN SATURATION: 97 %

## 2017-05-09 DIAGNOSIS — I95.1 ORTHOSTATIC HYPOTENSION: ICD-10-CM

## 2017-05-09 DIAGNOSIS — I50.43 ACUTE ON CHRONIC COMBINED SYSTOLIC (CONGESTIVE) AND DIASTOLIC (CONGESTIVE) HEART FAILURE: ICD-10-CM

## 2017-05-09 DIAGNOSIS — Z29.9 ENCOUNTER FOR PROPHYLACTIC MEASURES, UNSPECIFIED: ICD-10-CM

## 2017-05-09 DIAGNOSIS — N40.0 BENIGN PROSTATIC HYPERPLASIA WITHOUT LOWER URINARY TRACT SYMPTOMS: ICD-10-CM

## 2017-05-09 DIAGNOSIS — J44.9 CHRONIC OBSTRUCTIVE PULMONARY DISEASE, UNSPECIFIED: ICD-10-CM

## 2017-05-09 DIAGNOSIS — I95.89 OTHER HYPOTENSION: ICD-10-CM

## 2017-05-09 DIAGNOSIS — N18.6 END STAGE RENAL DISEASE: ICD-10-CM

## 2017-05-09 DIAGNOSIS — I48.91 UNSPECIFIED ATRIAL FIBRILLATION: ICD-10-CM

## 2017-05-09 DIAGNOSIS — E11.9 TYPE 2 DIABETES MELLITUS WITHOUT COMPLICATIONS: ICD-10-CM

## 2017-05-09 DIAGNOSIS — R55 SYNCOPE AND COLLAPSE: ICD-10-CM

## 2017-05-09 DIAGNOSIS — E78.5 HYPERLIPIDEMIA, UNSPECIFIED: ICD-10-CM

## 2017-05-09 LAB
ALBUMIN SERPL ELPH-MCNC: 2.8 G/DL — LOW (ref 3.3–5)
ALP SERPL-CCNC: 166 U/L — HIGH (ref 40–120)
ALT FLD-CCNC: 12 U/L — SIGNIFICANT CHANGE UP (ref 4–41)
APTT BLD: 49 SEC — HIGH (ref 27.5–37.4)
AST SERPL-CCNC: 19 U/L — SIGNIFICANT CHANGE UP (ref 4–40)
BASOPHILS # BLD AUTO: 0.01 K/UL — SIGNIFICANT CHANGE UP (ref 0–0.2)
BASOPHILS NFR BLD AUTO: 0.2 % — SIGNIFICANT CHANGE UP (ref 0–2)
BILIRUB SERPL-MCNC: 1 MG/DL — SIGNIFICANT CHANGE UP (ref 0.2–1.2)
BUN SERPL-MCNC: 29 MG/DL — HIGH (ref 7–23)
CALCIUM SERPL-MCNC: 9.3 MG/DL — SIGNIFICANT CHANGE UP (ref 8.4–10.5)
CHLORIDE SERPL-SCNC: 99 MMOL/L — SIGNIFICANT CHANGE UP (ref 98–107)
CK MB BLD-MCNC: 4.03 NG/ML — SIGNIFICANT CHANGE UP (ref 1–6.6)
CK MB BLD-MCNC: 4.42 NG/ML — SIGNIFICANT CHANGE UP (ref 1–6.6)
CK MB BLD-MCNC: SIGNIFICANT CHANGE UP (ref 0–2.5)
CK SERPL-CCNC: 116 U/L — SIGNIFICANT CHANGE UP (ref 30–200)
CK SERPL-CCNC: 116 U/L — SIGNIFICANT CHANGE UP (ref 30–200)
CO2 SERPL-SCNC: 26 MMOL/L — SIGNIFICANT CHANGE UP (ref 22–31)
CREAT SERPL-MCNC: 5.71 MG/DL — HIGH (ref 0.5–1.3)
EOSINOPHIL # BLD AUTO: 0.15 K/UL — SIGNIFICANT CHANGE UP (ref 0–0.5)
EOSINOPHIL NFR BLD AUTO: 2.7 % — SIGNIFICANT CHANGE UP (ref 0–6)
GLUCOSE SERPL-MCNC: 103 MG/DL — HIGH (ref 70–99)
HCT VFR BLD CALC: 35.8 % — LOW (ref 39–50)
HGB BLD-MCNC: 10.6 G/DL — LOW (ref 13–17)
IMM GRANULOCYTES NFR BLD AUTO: 0.4 % — SIGNIFICANT CHANGE UP (ref 0–1.5)
INR BLD: 2.95 — HIGH (ref 0.88–1.17)
LYMPHOCYTES # BLD AUTO: 0.82 K/UL — LOW (ref 1–3.3)
LYMPHOCYTES # BLD AUTO: 14.5 % — SIGNIFICANT CHANGE UP (ref 13–44)
MCHC RBC-ENTMCNC: 28.7 PG — SIGNIFICANT CHANGE UP (ref 27–34)
MCHC RBC-ENTMCNC: 29.6 % — LOW (ref 32–36)
MCV RBC AUTO: 97 FL — SIGNIFICANT CHANGE UP (ref 80–100)
MONOCYTES # BLD AUTO: 0.67 K/UL — SIGNIFICANT CHANGE UP (ref 0–0.9)
MONOCYTES NFR BLD AUTO: 11.9 % — SIGNIFICANT CHANGE UP (ref 2–14)
NEUTROPHILS # BLD AUTO: 3.97 K/UL — SIGNIFICANT CHANGE UP (ref 1.8–7.4)
NEUTROPHILS NFR BLD AUTO: 70.3 % — SIGNIFICANT CHANGE UP (ref 43–77)
NT-PROBNP SERPL-SCNC: SIGNIFICANT CHANGE UP PG/ML
PLATELET # BLD AUTO: 133 K/UL — LOW (ref 150–400)
PMV BLD: 12.5 FL — SIGNIFICANT CHANGE UP (ref 7–13)
POTASSIUM SERPL-MCNC: 4.3 MMOL/L — SIGNIFICANT CHANGE UP (ref 3.5–5.3)
POTASSIUM SERPL-SCNC: 4.3 MMOL/L — SIGNIFICANT CHANGE UP (ref 3.5–5.3)
PROT SERPL-MCNC: 7.1 G/DL — SIGNIFICANT CHANGE UP (ref 6–8.3)
PROTHROM AB SERPL-ACNC: 33.8 SEC — HIGH (ref 9.8–13.1)
RBC # BLD: 3.69 M/UL — LOW (ref 4.2–5.8)
RBC # FLD: 18.2 % — HIGH (ref 10.3–14.5)
SODIUM SERPL-SCNC: 141 MMOL/L — SIGNIFICANT CHANGE UP (ref 135–145)
TROPONIN T SERPL-MCNC: 0.26 NG/ML — HIGH (ref 0–0.06)
TROPONIN T SERPL-MCNC: 0.27 NG/ML — HIGH (ref 0–0.06)
WBC # BLD: 5.64 K/UL — SIGNIFICANT CHANGE UP (ref 3.8–10.5)
WBC # FLD AUTO: 5.64 K/UL — SIGNIFICANT CHANGE UP (ref 3.8–10.5)

## 2017-05-09 PROCEDURE — 93282 PRGRMG EVAL IMPLANTABLE DFB: CPT | Mod: 26

## 2017-05-09 PROCEDURE — 71010: CPT | Mod: 26

## 2017-05-09 RX ORDER — DEXTROSE 50 % IN WATER 50 %
25 SYRINGE (ML) INTRAVENOUS ONCE
Qty: 0 | Refills: 0 | Status: DISCONTINUED | OUTPATIENT
Start: 2017-05-09 | End: 2017-06-23

## 2017-05-09 RX ORDER — FINASTERIDE 5 MG/1
5 TABLET, FILM COATED ORAL DAILY
Qty: 0 | Refills: 0 | Status: DISCONTINUED | OUTPATIENT
Start: 2017-05-09 | End: 2017-06-23

## 2017-05-09 RX ORDER — INSULIN LISPRO 100/ML
VIAL (ML) SUBCUTANEOUS AT BEDTIME
Qty: 0 | Refills: 0 | Status: DISCONTINUED | OUTPATIENT
Start: 2017-05-09 | End: 2017-06-23

## 2017-05-09 RX ORDER — DOCUSATE SODIUM 100 MG
100 CAPSULE ORAL DAILY
Qty: 0 | Refills: 0 | Status: DISCONTINUED | OUTPATIENT
Start: 2017-05-09 | End: 2017-06-23

## 2017-05-09 RX ORDER — DEXTROSE 50 % IN WATER 50 %
12.5 SYRINGE (ML) INTRAVENOUS ONCE
Qty: 0 | Refills: 0 | Status: DISCONTINUED | OUTPATIENT
Start: 2017-05-09 | End: 2017-06-23

## 2017-05-09 RX ORDER — FERROUS SULFATE 325(65) MG
325 TABLET ORAL DAILY
Qty: 0 | Refills: 0 | Status: DISCONTINUED | OUTPATIENT
Start: 2017-05-09 | End: 2017-05-16

## 2017-05-09 RX ORDER — DEXTROSE 50 % IN WATER 50 %
1 SYRINGE (ML) INTRAVENOUS ONCE
Qty: 0 | Refills: 0 | Status: DISCONTINUED | OUTPATIENT
Start: 2017-05-09 | End: 2017-06-23

## 2017-05-09 RX ORDER — SEVELAMER CARBONATE 2400 MG/1
800 POWDER, FOR SUSPENSION ORAL
Qty: 0 | Refills: 0 | Status: DISCONTINUED | OUTPATIENT
Start: 2017-05-09 | End: 2017-06-23

## 2017-05-09 RX ORDER — SODIUM CHLORIDE 9 MG/ML
3 INJECTION INTRAMUSCULAR; INTRAVENOUS; SUBCUTANEOUS ONCE
Qty: 0 | Refills: 0 | Status: COMPLETED | OUTPATIENT
Start: 2017-05-09 | End: 2017-05-09

## 2017-05-09 RX ORDER — ATORVASTATIN CALCIUM 80 MG/1
20 TABLET, FILM COATED ORAL AT BEDTIME
Qty: 0 | Refills: 0 | Status: DISCONTINUED | OUTPATIENT
Start: 2017-05-09 | End: 2017-06-23

## 2017-05-09 RX ORDER — INSULIN GLARGINE 100 [IU]/ML
4 INJECTION, SOLUTION SUBCUTANEOUS AT BEDTIME
Qty: 0 | Refills: 0 | Status: DISCONTINUED | OUTPATIENT
Start: 2017-05-09 | End: 2017-05-12

## 2017-05-09 RX ORDER — DOBUTAMINE HCL 250MG/20ML
5 VIAL (ML) INTRAVENOUS
Qty: 500 | Refills: 0 | Status: DISCONTINUED | OUTPATIENT
Start: 2017-05-09 | End: 2017-05-11

## 2017-05-09 RX ORDER — LEVOTHYROXINE SODIUM 125 MCG
75 TABLET ORAL DAILY
Qty: 0 | Refills: 0 | Status: DISCONTINUED | OUTPATIENT
Start: 2017-05-09 | End: 2017-06-23

## 2017-05-09 RX ORDER — WARFARIN SODIUM 2.5 MG/1
2 TABLET ORAL ONCE
Qty: 0 | Refills: 0 | Status: COMPLETED | OUTPATIENT
Start: 2017-05-09 | End: 2017-05-09

## 2017-05-09 RX ORDER — SODIUM CHLORIDE 9 MG/ML
1000 INJECTION, SOLUTION INTRAVENOUS
Qty: 0 | Refills: 0 | Status: DISCONTINUED | OUTPATIENT
Start: 2017-05-09 | End: 2017-06-23

## 2017-05-09 RX ORDER — MIDODRINE HYDROCHLORIDE 2.5 MG/1
30 TABLET ORAL EVERY 8 HOURS
Qty: 0 | Refills: 0 | Status: DISCONTINUED | OUTPATIENT
Start: 2017-05-09 | End: 2017-06-23

## 2017-05-09 RX ORDER — GLUCAGON INJECTION, SOLUTION 0.5 MG/.1ML
1 INJECTION, SOLUTION SUBCUTANEOUS ONCE
Qty: 0 | Refills: 0 | Status: DISCONTINUED | OUTPATIENT
Start: 2017-05-09 | End: 2017-06-23

## 2017-05-09 RX ORDER — INSULIN LISPRO 100/ML
VIAL (ML) SUBCUTANEOUS
Qty: 0 | Refills: 0 | Status: DISCONTINUED | OUTPATIENT
Start: 2017-05-09 | End: 2017-06-23

## 2017-05-09 RX ORDER — AMIODARONE HYDROCHLORIDE 400 MG/1
200 TABLET ORAL DAILY
Qty: 0 | Refills: 0 | Status: DISCONTINUED | OUTPATIENT
Start: 2017-05-09 | End: 2017-06-23

## 2017-05-09 RX ADMIN — INSULIN GLARGINE 4 UNIT(S): 100 INJECTION, SOLUTION SUBCUTANEOUS at 22:25

## 2017-05-09 RX ADMIN — Medication 14.9 MICROGRAM(S)/KG/MIN: at 11:42

## 2017-05-09 RX ADMIN — Medication 14.9 MICROGRAM(S)/KG/MIN: at 16:45

## 2017-05-09 RX ADMIN — SEVELAMER CARBONATE 800 MILLIGRAM(S): 2400 POWDER, FOR SUSPENSION ORAL at 13:31

## 2017-05-09 RX ADMIN — Medication 75 MICROGRAM(S): at 11:28

## 2017-05-09 RX ADMIN — WARFARIN SODIUM 2 MILLIGRAM(S): 2.5 TABLET ORAL at 17:11

## 2017-05-09 RX ADMIN — Medication 325 MILLIGRAM(S): at 11:27

## 2017-05-09 RX ADMIN — SODIUM CHLORIDE 3 MILLILITER(S): 9 INJECTION INTRAMUSCULAR; INTRAVENOUS; SUBCUTANEOUS at 06:17

## 2017-05-09 RX ADMIN — SEVELAMER CARBONATE 800 MILLIGRAM(S): 2400 POWDER, FOR SUSPENSION ORAL at 17:11

## 2017-05-09 RX ADMIN — MIDODRINE HYDROCHLORIDE 30 MILLIGRAM(S): 2.5 TABLET ORAL at 16:45

## 2017-05-09 NOTE — ED ADULT NURSE NOTE - OBJECTIVE STATEMENT
Pt received to [facility] Presents alert and oriented from home w/ c/o syncopal episode while getting into the car with wife to go to dialysis. Pt rpts feeling faint prior to passing out. Pt is TuThuSat dialysis pt w/ last dialysis treatment being on Sat. Pt has continuous dobutamine infusion through left arm PICC line. Pt has right chest wall Shiley for dialysis. Pt is on 4L Oxygen via nasal cannula 24 hrs a day. Rpts SOB at present to be baseline. Respirations appear even and unlabored. Wife at bedside. VS noted. Will continue to monitor.

## 2017-05-09 NOTE — H&P ADULT. - ASSESSMENT
63 y/o male with a PMHx of NICM with severe LV dysfunction (EF 19%) S/P Biotronic ICD placement on Dobutamine gtt at home, ESRD on HD M/W/F, atrial fibrillation on Coumadin, COPD on 4L home O2, HLD, DM, chronic hypotension on Midodrine, presents to ED S/P syncopal episode, complicated by acute on chronic combined systolic and diastolic left and right sided CHF exacerbation.

## 2017-05-09 NOTE — H&P ADULT. - PROBLEM SELECTOR PLAN 4
Monitor on tele, currently sinus tachy  Continue Amiodarone for rate control  Continue Coumadin for target INR 2.0-3.0  INR therapeutic; will dose Coumadin 2mg today  Check INR daily and dose accordingly

## 2017-05-09 NOTE — ED PROVIDER NOTE - MEDICAL DECISION MAKING DETAILS
sob, syncope, r/o chf, r/o arrythmia, check lytes, plan for hd, admit for arrythmia monitoring, re-assess sob, syncope, severe head failure on chronic   ggt, midodrine, already has acid, check lytes, plan for hd,  re-assess

## 2017-05-09 NOTE — ED ADULT NURSE REASSESSMENT NOTE - NS ED NURSE REASSESS COMMENT FT1
Report received from night RN. Patient does not appear to be in distress at this time. Respirations are even and unlabored. Pt. is on 3.5 L NC. Awaiting further orders.

## 2017-05-09 NOTE — H&P ADULT. - PROBLEM SELECTOR PLAN 2
Monitor on tele, serial cardiac enzymes, serial EKGs  Continue Dobutamine gtt as ordered via PICC line  Renal consult with Dr. Barrios called for HD set up and fluid removal via HD  Strict I&Os, monitor daily weights, 1500 cc fluid restriction, sodium restriction  F/U MD note

## 2017-05-09 NOTE — H&P ADULT. - NEGATIVE OPHTHALMOLOGIC SYMPTOMS
no photophobia/no loss of vision R/no diplopia/no loss of vision L/no pain R/no pain L/no blurred vision R/no blurred vision L

## 2017-05-09 NOTE — PHYSICAL THERAPY INITIAL EVALUATION ADULT - ACTIVE RANGE OF MOTION EXAMINATION, REHAB EVAL
bilateral upper extremity Active ROM was WFL (within functional limits)/except  active assisted  L shoulder flexion 0- 80 degrees/bilateral  lower extremity Active ROM was WFL (within functional limits)

## 2017-05-09 NOTE — H&P ADULT. - ATTENDING COMMENTS
pt seen and examined  above reviewed  cards.ep/renal evals  cont meds as above  overall prognosis poor

## 2017-05-09 NOTE — PHYSICAL THERAPY INITIAL EVALUATION ADULT - GENERAL OBSERVATIONS, REHAB EVAL
Patient received in the ED on a stretcher , (+) IV, (+) tele monitor,(+) O2 , patient with c/o nausea ,MARGY Pulido informed and  is aware.

## 2017-05-09 NOTE — H&P ADULT. - PMH
AF (atrial fibrillation)    BPH (benign prostatic hypertrophy)    CHF (congestive heart failure)    Chronic hypotension    COPD (chronic obstructive pulmonary disease)  4L home O2  DM (diabetes mellitus)    ESRD (end stage renal disease) on dialysis    Gout    HLD (hyperlipidemia)    Myocardial infarction  10/2011

## 2017-05-09 NOTE — ED PROVIDER NOTE - PROGRESS NOTE DETAILS
n[place]: pt s/o to me & dr mayer. spoke to dr barba & family. came to shared decision to adm pt to dr base for syncope w/u. family uncomfortable w/ taking pt home due to falls. pt has enstage renal, pulmonary & cardiac dz.

## 2017-05-09 NOTE — H&P ADULT. - NEGATIVE NEUROLOGICAL SYMPTOMS
no tremors/no difficulty walking/no focal seizures/no paresthesias/no weakness/no hemiparesis/no transient paralysis/no vertigo/no loss of sensation/no headache/no confusion/no facial palsy/no generalized seizures

## 2017-05-09 NOTE — ED PROVIDER NOTE - CARE PLAN
Principal Discharge DX:	Syncope, unspecified syncope type  Secondary Diagnosis:	ESRD on hemodialysis

## 2017-05-09 NOTE — H&P ADULT. - RS GEN PE MLT RESP DETAILS PC
no intercostal retractions/no chest wall tenderness/respirations non-labored/no wheezes/good air movement/airway patent/rales/no rhonchi

## 2017-05-09 NOTE — H&P ADULT. - PROBLEM SELECTOR PLAN 3
Monitor renal function and lytes  Renal consult with Dr. Barrios called for HD set up today  Continue renal meds and renally restricted diet  Continue Midodrine

## 2017-05-09 NOTE — ED PROVIDER NOTE - OBJECTIVE STATEMENT
62M with pmh of CAD, ischemic cardiomyopathy with severe LV dysfunction (EF 26%) S/P Biotonic ICD placement, on 24/7 dobutamine, atrial fibrillation (on Coumadin), COPD on home oxygen (4L at baseline), ESRD (on HD T/R/S - last HD S), HLD, DM, gout, chronic hypotension on Midodrine p/w syncope x 1; was were going to car and passed own - and sat down; no head trauma, unconcious for 5 minutes; no loss of bowel/bladder; + cough -chronic; + chills since sat; no recent antibiotics; no cp/ + increased 02 requirement at home, goes upto 5.5L prn, + increased swelling in legs;     cards: Dr. Thaddeus Davis- has apt today - [facility];

## 2017-05-09 NOTE — ED ADULT TRIAGE NOTE - CHIEF COMPLAINT QUOTE
pt arrives via EMS w/ c/o difficulty breathing with nausea and vomiting. pt going to Dialysis when he began to feel weak and sick. pt vomiting greenish bile actively vomiting in triage. pt denies any CP. pt hypotensive.

## 2017-05-09 NOTE — H&P ADULT. - OTHER
Echo, April 2017: EF 19%. Severe global left ventricular systolic dysfunction. Severe diastolic dysfunction. Moderate right atrial enlargement. Right ventricular enlargement with decreased right ventricular systolic function.

## 2017-05-09 NOTE — ED ADULT NURSE REASSESSMENT NOTE - NS ED NURSE REASSESS COMMENT FT1
Pt. O2 sat dropped to 85. Pt. placed on 4 L NC. O2 increased to 100. Pt. appears comfortable. Respirations are even and unlabored. VS as noted. Pt. O2 sat dropped to 85. Pt. placed on 4 L NC. O2 sat increased to 100. Pt. appears comfortable. Respirations are even and unlabored. VS as noted.

## 2017-05-09 NOTE — H&P ADULT. - NEUROLOGICAL DETAILS
normal strength/responds to verbal commands/sensation intact/cranial nerves intact/alert and oriented x 3/responds to pain

## 2017-05-09 NOTE — PHYSICAL THERAPY INITIAL EVALUATION ADULT - PERTINENT HX OF CURRENT PROBLEM, REHAB EVAL
This is a 61 y/o M  presents to ED S/P syncopal episode, complicated by acute on chronic combined systolic and diastolic left and right sided CHF exacerbation.

## 2017-05-09 NOTE — H&P ADULT. - HISTORY OF PRESENT ILLNESS
61 y/o male with a PMHx of NICM with severe LV dysfunction (EF 19%) S/P Biotronic ICD placement on Dobutamine gtt via chronic left upper extremity PICC line, ESRD on HD M/W/F, atrial fibrillation on Coumadin, COPD on 4L home O2, HLD, DM, chronic hypotension on Midodrine, presents to ED S/P syncopal episode. Pt lives with his wife who is his primary caregiver and has a visiting nurse that comes weekly to change the dressing of his PICC line; he ambulates with a walker. Pt was getting ready for his dialysis session this morning before 5:00AM. On his way to the car outside, he started to shake and his legs started to give out. Wife noticed this and was able to brace him and lower to the ground to avoid any head or bodily trauma. According to wife, pt lost consciousness for approximately 5 minutes. In that time, wife called patient's son from inside the house who came outside to help his father up. EMS was then called and brought patient to the ED. Pt only admits to chronic dry cough and chronic dyspnea on exertion. Pt admits to being compliant with his diet, fluid restrictions, and medication administration. Pt denies fever, chills, recent travel, headache, dizziness, visual deficits, chest pain, PND, palpitations, abdominal pain, N/V/D/C, hematochezia, melena, dysuria, hematuria, fecal incontinence, seizures, convulsions. Upon arrival to ED, EKG: Sinus tachycardia at 105 bpm, LAD. CE x1: Trop 0.28, CK negative. CXR: Perihilar reticular and groundglass opacities consistent with pulmonary edema. Moderate pulmonary edema with likely small right pleural effusion. H/H: 10.6/35.8. Platelets: 133. BUN/Cr: 29/5.71. Alk phos: 166. ProBNP: 21671. Pt made DNR by MAR before admitted to [facility].

## 2017-05-09 NOTE — H&P ADULT. - NEGATIVE GASTROINTESTINAL SYMPTOMS
no constipation/no vomiting/no change in bowel habits/no nausea/no abdominal pain/no melena/no flatulence/no hematochezia/no diarrhea

## 2017-05-10 LAB
BUN SERPL-MCNC: 16 MG/DL — SIGNIFICANT CHANGE UP (ref 7–23)
CALCIUM SERPL-MCNC: 8.9 MG/DL — SIGNIFICANT CHANGE UP (ref 8.4–10.5)
CHLORIDE SERPL-SCNC: 98 MMOL/L — SIGNIFICANT CHANGE UP (ref 98–107)
CHOLEST SERPL-MCNC: 89 MG/DL — LOW (ref 120–199)
CO2 SERPL-SCNC: 28 MMOL/L — SIGNIFICANT CHANGE UP (ref 22–31)
CREAT SERPL-MCNC: 4.16 MG/DL — HIGH (ref 0.5–1.3)
GLUCOSE SERPL-MCNC: 65 MG/DL — LOW (ref 70–99)
HBA1C BLD-MCNC: 7.1 % — HIGH (ref 4–5.6)
HBV CORE AB SER-ACNC: NONREACTIVE — SIGNIFICANT CHANGE UP
HBV SURFACE AB SER-ACNC: <3 MLU/ML — LOW
HCT VFR BLD CALC: 36.3 % — LOW (ref 39–50)
HCV AB S/CO SERPL IA: 0.15 S/CO — SIGNIFICANT CHANGE UP
HCV AB SERPL-IMP: SIGNIFICANT CHANGE UP
HDLC SERPL-MCNC: 40 MG/DL — SIGNIFICANT CHANGE UP (ref 35–55)
HGB BLD-MCNC: 10.7 G/DL — LOW (ref 13–17)
INR BLD: 3.4 — HIGH (ref 0.88–1.17)
LIPID PNL WITH DIRECT LDL SERPL: 31 MG/DL — SIGNIFICANT CHANGE UP
MAGNESIUM SERPL-MCNC: 2.1 MG/DL — SIGNIFICANT CHANGE UP (ref 1.6–2.6)
MCHC RBC-ENTMCNC: 28.2 PG — SIGNIFICANT CHANGE UP (ref 27–34)
MCHC RBC-ENTMCNC: 29.5 % — LOW (ref 32–36)
MCV RBC AUTO: 95.8 FL — SIGNIFICANT CHANGE UP (ref 80–100)
PLATELET # BLD AUTO: 138 K/UL — LOW (ref 150–400)
PMV BLD: 12 FL — SIGNIFICANT CHANGE UP (ref 7–13)
POTASSIUM SERPL-MCNC: 3.7 MMOL/L — SIGNIFICANT CHANGE UP (ref 3.5–5.3)
POTASSIUM SERPL-SCNC: 3.7 MMOL/L — SIGNIFICANT CHANGE UP (ref 3.5–5.3)
PROTHROM AB SERPL-ACNC: 39 SEC — HIGH (ref 9.8–13.1)
RBC # BLD: 3.79 M/UL — LOW (ref 4.2–5.8)
RBC # FLD: 18.3 % — HIGH (ref 10.3–14.5)
SODIUM SERPL-SCNC: 139 MMOL/L — SIGNIFICANT CHANGE UP (ref 135–145)
TRIGL SERPL-MCNC: 61 MG/DL — SIGNIFICANT CHANGE UP (ref 10–149)
TSH SERPL-MCNC: 4.82 UIU/ML — HIGH (ref 0.27–4.2)
WBC # BLD: 5.15 K/UL — SIGNIFICANT CHANGE UP (ref 3.8–10.5)
WBC # FLD AUTO: 5.15 K/UL — SIGNIFICANT CHANGE UP (ref 3.8–10.5)

## 2017-05-10 PROCEDURE — 99223 1ST HOSP IP/OBS HIGH 75: CPT

## 2017-05-10 RX ADMIN — SEVELAMER CARBONATE 800 MILLIGRAM(S): 2400 POWDER, FOR SUSPENSION ORAL at 09:02

## 2017-05-10 RX ADMIN — Medication 75 MICROGRAM(S): at 05:27

## 2017-05-10 RX ADMIN — MIDODRINE HYDROCHLORIDE 30 MILLIGRAM(S): 2.5 TABLET ORAL at 14:14

## 2017-05-10 RX ADMIN — Medication 100 MILLIGRAM(S): at 12:57

## 2017-05-10 RX ADMIN — MIDODRINE HYDROCHLORIDE 30 MILLIGRAM(S): 2.5 TABLET ORAL at 05:27

## 2017-05-10 RX ADMIN — AMIODARONE HYDROCHLORIDE 200 MILLIGRAM(S): 400 TABLET ORAL at 05:27

## 2017-05-10 RX ADMIN — MIDODRINE HYDROCHLORIDE 30 MILLIGRAM(S): 2.5 TABLET ORAL at 23:33

## 2017-05-10 RX ADMIN — SEVELAMER CARBONATE 800 MILLIGRAM(S): 2400 POWDER, FOR SUSPENSION ORAL at 17:10

## 2017-05-10 RX ADMIN — ATORVASTATIN CALCIUM 20 MILLIGRAM(S): 80 TABLET, FILM COATED ORAL at 01:11

## 2017-05-10 RX ADMIN — FINASTERIDE 5 MILLIGRAM(S): 5 TABLET, FILM COATED ORAL at 12:58

## 2017-05-10 RX ADMIN — Medication 14.9 MICROGRAM(S)/KG/MIN: at 23:44

## 2017-05-10 RX ADMIN — MIDODRINE HYDROCHLORIDE 30 MILLIGRAM(S): 2.5 TABLET ORAL at 01:12

## 2017-05-10 RX ADMIN — SEVELAMER CARBONATE 800 MILLIGRAM(S): 2400 POWDER, FOR SUSPENSION ORAL at 12:58

## 2017-05-10 RX ADMIN — Medication 325 MILLIGRAM(S): at 12:58

## 2017-05-10 RX ADMIN — ATORVASTATIN CALCIUM 20 MILLIGRAM(S): 80 TABLET, FILM COATED ORAL at 23:44

## 2017-05-10 RX ADMIN — Medication 14.9 MICROGRAM(S)/KG/MIN: at 09:02

## 2017-05-10 RX ADMIN — Medication 14.9 MICROGRAM(S)/KG/MIN: at 05:27

## 2017-05-10 RX ADMIN — Medication 3: at 17:08

## 2017-05-10 NOTE — DIETITIAN INITIAL EVALUATION ADULT. - PROBLEM SELECTOR PLAN 1
Admit to telemetry, serial EKGs, serial cardiac enzymes, orthostatics  Fall precautions, ambulate with assistance  Check CBC, CMP, TSH, FLP, HgA1C  No need for repeat echocardiogram (results from prior one as above)  Likely orthostatic hypotension but EP called to interrogate ICD  PT eval ordered  F/U MD note

## 2017-05-10 NOTE — DIETITIAN INITIAL EVALUATION ADULT. - OTHER INFO
Nutrition consult received for RD-heart failure linked order set. Patient reports good PO intake at present and prior to admission. Patient denies any nausea/vomiting/diarrhea/constipation or difficulty chewing and swallowing. Pt endorses good adherence to recommended diet prior to admission.  Pt follows a reduced Na diet and does not add salt to foods and adheres to potassium and phosphorus restrictions.  Additional review of recommended diet reviewed.

## 2017-05-10 NOTE — DIETITIAN INITIAL EVALUATION ADULT. - DIET TYPE
renal replacement pts:no protein restr,no conc K & phos, low sodium/DASH/TLC (sodium and cholesterol restricted diet)/consistent carbohydrate (no snacks)

## 2017-05-10 NOTE — DIETITIAN INITIAL EVALUATION ADULT. - ENERGY NEEDS
Wt 191.5 Ht 71" BMI 26.7 kg/m^2  Ideal Wt. 172 +/- 10%   2+ edema of the lower extremities.  Pt ESRD on HD

## 2017-05-10 NOTE — DIETITIAN INITIAL EVALUATION ADULT. - PHYSICAL APPEARANCE
well nourished/Pt states that his usual Wt. is " 200 something", but he is not able to discuss further.

## 2017-05-11 LAB
BUN SERPL-MCNC: 22 MG/DL — SIGNIFICANT CHANGE UP (ref 7–23)
CALCIUM SERPL-MCNC: 8.4 MG/DL — SIGNIFICANT CHANGE UP (ref 8.4–10.5)
CHLORIDE SERPL-SCNC: 98 MMOL/L — SIGNIFICANT CHANGE UP (ref 98–107)
CO2 SERPL-SCNC: 25 MMOL/L — SIGNIFICANT CHANGE UP (ref 22–31)
CREAT SERPL-MCNC: 4.67 MG/DL — HIGH (ref 0.5–1.3)
GLUCOSE SERPL-MCNC: 93 MG/DL — SIGNIFICANT CHANGE UP (ref 70–99)
HCT VFR BLD CALC: 35.7 % — LOW (ref 39–50)
HGB BLD-MCNC: 10.6 G/DL — LOW (ref 13–17)
INR BLD: 3.23 — HIGH (ref 0.88–1.17)
MAGNESIUM SERPL-MCNC: 2 MG/DL — SIGNIFICANT CHANGE UP (ref 1.6–2.6)
MCHC RBC-ENTMCNC: 28.3 PG — SIGNIFICANT CHANGE UP (ref 27–34)
MCHC RBC-ENTMCNC: 29.7 % — LOW (ref 32–36)
MCV RBC AUTO: 95.5 FL — SIGNIFICANT CHANGE UP (ref 80–100)
PLATELET # BLD AUTO: 132 K/UL — LOW (ref 150–400)
PMV BLD: 12.5 FL — SIGNIFICANT CHANGE UP (ref 7–13)
POTASSIUM SERPL-MCNC: 3.7 MMOL/L — SIGNIFICANT CHANGE UP (ref 3.5–5.3)
POTASSIUM SERPL-SCNC: 3.7 MMOL/L — SIGNIFICANT CHANGE UP (ref 3.5–5.3)
PROTHROM AB SERPL-ACNC: 37.1 SEC — HIGH (ref 9.8–13.1)
RBC # BLD: 3.74 M/UL — LOW (ref 4.2–5.8)
RBC # FLD: 18.2 % — HIGH (ref 10.3–14.5)
SODIUM SERPL-SCNC: 137 MMOL/L — SIGNIFICANT CHANGE UP (ref 135–145)
WBC # BLD: 4.45 K/UL — SIGNIFICANT CHANGE UP (ref 3.8–10.5)
WBC # FLD AUTO: 4.45 K/UL — SIGNIFICANT CHANGE UP (ref 3.8–10.5)

## 2017-05-11 PROCEDURE — 99233 SBSQ HOSP IP/OBS HIGH 50: CPT

## 2017-05-11 RX ORDER — DOBUTAMINE HCL 250MG/20ML
2.5 VIAL (ML) INTRAVENOUS
Qty: 500 | Refills: 0 | Status: DISCONTINUED | OUTPATIENT
Start: 2017-05-11 | End: 2017-05-16

## 2017-05-11 RX ADMIN — MIDODRINE HYDROCHLORIDE 30 MILLIGRAM(S): 2.5 TABLET ORAL at 14:51

## 2017-05-11 RX ADMIN — Medication 1: at 14:00

## 2017-05-11 RX ADMIN — ATORVASTATIN CALCIUM 20 MILLIGRAM(S): 80 TABLET, FILM COATED ORAL at 21:46

## 2017-05-11 RX ADMIN — Medication 75 MICROGRAM(S): at 05:54

## 2017-05-11 RX ADMIN — FINASTERIDE 5 MILLIGRAM(S): 5 TABLET, FILM COATED ORAL at 14:02

## 2017-05-11 RX ADMIN — SEVELAMER CARBONATE 800 MILLIGRAM(S): 2400 POWDER, FOR SUSPENSION ORAL at 17:33

## 2017-05-11 RX ADMIN — MIDODRINE HYDROCHLORIDE 30 MILLIGRAM(S): 2.5 TABLET ORAL at 05:54

## 2017-05-11 RX ADMIN — AMIODARONE HYDROCHLORIDE 200 MILLIGRAM(S): 400 TABLET ORAL at 05:54

## 2017-05-11 RX ADMIN — MIDODRINE HYDROCHLORIDE 30 MILLIGRAM(S): 2.5 TABLET ORAL at 21:46

## 2017-05-11 RX ADMIN — INSULIN GLARGINE 4 UNIT(S): 100 INJECTION, SOLUTION SUBCUTANEOUS at 21:46

## 2017-05-11 RX ADMIN — Medication 325 MILLIGRAM(S): at 14:01

## 2017-05-11 RX ADMIN — SEVELAMER CARBONATE 800 MILLIGRAM(S): 2400 POWDER, FOR SUSPENSION ORAL at 14:04

## 2017-05-12 LAB
BASOPHILS # BLD AUTO: 0.01 K/UL — SIGNIFICANT CHANGE UP (ref 0–0.2)
BASOPHILS NFR BLD AUTO: 0.2 % — SIGNIFICANT CHANGE UP (ref 0–2)
BUN SERPL-MCNC: 17 MG/DL — SIGNIFICANT CHANGE UP (ref 7–23)
CALCIUM SERPL-MCNC: 9 MG/DL — SIGNIFICANT CHANGE UP (ref 8.4–10.5)
CHLORIDE SERPL-SCNC: 99 MMOL/L — SIGNIFICANT CHANGE UP (ref 98–107)
CO2 SERPL-SCNC: 25 MMOL/L — SIGNIFICANT CHANGE UP (ref 22–31)
CREAT SERPL-MCNC: 3.87 MG/DL — HIGH (ref 0.5–1.3)
EOSINOPHIL # BLD AUTO: 0.13 K/UL — SIGNIFICANT CHANGE UP (ref 0–0.5)
EOSINOPHIL NFR BLD AUTO: 3.1 % — SIGNIFICANT CHANGE UP (ref 0–6)
GLUCOSE SERPL-MCNC: 60 MG/DL — LOW (ref 70–99)
HCT VFR BLD CALC: 38.5 % — LOW (ref 39–50)
HGB BLD-MCNC: 11.2 G/DL — LOW (ref 13–17)
IMM GRANULOCYTES NFR BLD AUTO: 0 % — SIGNIFICANT CHANGE UP (ref 0–1.5)
INR BLD: 2.45 — HIGH (ref 0.88–1.17)
LYMPHOCYTES # BLD AUTO: 1.09 K/UL — SIGNIFICANT CHANGE UP (ref 1–3.3)
LYMPHOCYTES # BLD AUTO: 25.9 % — SIGNIFICANT CHANGE UP (ref 13–44)
MAGNESIUM SERPL-MCNC: 2.2 MG/DL — SIGNIFICANT CHANGE UP (ref 1.6–2.6)
MCHC RBC-ENTMCNC: 28.1 PG — SIGNIFICANT CHANGE UP (ref 27–34)
MCHC RBC-ENTMCNC: 29.1 % — LOW (ref 32–36)
MCV RBC AUTO: 96.7 FL — SIGNIFICANT CHANGE UP (ref 80–100)
MONOCYTES # BLD AUTO: 0.67 K/UL — SIGNIFICANT CHANGE UP (ref 0–0.9)
MONOCYTES NFR BLD AUTO: 15.9 % — HIGH (ref 2–14)
NEUTROPHILS # BLD AUTO: 2.31 K/UL — SIGNIFICANT CHANGE UP (ref 1.8–7.4)
NEUTROPHILS NFR BLD AUTO: 54.9 % — SIGNIFICANT CHANGE UP (ref 43–77)
PLATELET # BLD AUTO: 139 K/UL — LOW (ref 150–400)
PMV BLD: 12.4 FL — SIGNIFICANT CHANGE UP (ref 7–13)
POTASSIUM SERPL-MCNC: 3.8 MMOL/L — SIGNIFICANT CHANGE UP (ref 3.5–5.3)
POTASSIUM SERPL-SCNC: 3.8 MMOL/L — SIGNIFICANT CHANGE UP (ref 3.5–5.3)
PROTHROM AB SERPL-ACNC: 27.9 SEC — HIGH (ref 9.8–13.1)
RBC # BLD: 3.98 M/UL — LOW (ref 4.2–5.8)
RBC # FLD: 17.9 % — HIGH (ref 10.3–14.5)
SODIUM SERPL-SCNC: 138 MMOL/L — SIGNIFICANT CHANGE UP (ref 135–145)
WBC # BLD: 4.21 K/UL — SIGNIFICANT CHANGE UP (ref 3.8–10.5)
WBC # FLD AUTO: 4.21 K/UL — SIGNIFICANT CHANGE UP (ref 3.8–10.5)

## 2017-05-12 PROCEDURE — 99233 SBSQ HOSP IP/OBS HIGH 50: CPT

## 2017-05-12 RX ADMIN — MIDODRINE HYDROCHLORIDE 30 MILLIGRAM(S): 2.5 TABLET ORAL at 22:17

## 2017-05-12 RX ADMIN — SEVELAMER CARBONATE 800 MILLIGRAM(S): 2400 POWDER, FOR SUSPENSION ORAL at 08:46

## 2017-05-12 RX ADMIN — Medication 100 MILLIGRAM(S): at 11:55

## 2017-05-12 RX ADMIN — ATORVASTATIN CALCIUM 20 MILLIGRAM(S): 80 TABLET, FILM COATED ORAL at 22:17

## 2017-05-12 RX ADMIN — AMIODARONE HYDROCHLORIDE 200 MILLIGRAM(S): 400 TABLET ORAL at 05:45

## 2017-05-12 RX ADMIN — Medication 75 MICROGRAM(S): at 05:45

## 2017-05-12 RX ADMIN — SEVELAMER CARBONATE 800 MILLIGRAM(S): 2400 POWDER, FOR SUSPENSION ORAL at 11:56

## 2017-05-12 RX ADMIN — FINASTERIDE 5 MILLIGRAM(S): 5 TABLET, FILM COATED ORAL at 11:55

## 2017-05-12 RX ADMIN — MIDODRINE HYDROCHLORIDE 30 MILLIGRAM(S): 2.5 TABLET ORAL at 05:45

## 2017-05-12 RX ADMIN — MIDODRINE HYDROCHLORIDE 30 MILLIGRAM(S): 2.5 TABLET ORAL at 11:56

## 2017-05-12 RX ADMIN — SEVELAMER CARBONATE 800 MILLIGRAM(S): 2400 POWDER, FOR SUSPENSION ORAL at 17:30

## 2017-05-12 RX ADMIN — Medication 325 MILLIGRAM(S): at 11:55

## 2017-05-13 LAB
BUN SERPL-MCNC: 25 MG/DL — HIGH (ref 7–23)
CALCIUM SERPL-MCNC: 9 MG/DL — SIGNIFICANT CHANGE UP (ref 8.4–10.5)
CHLORIDE SERPL-SCNC: 97 MMOL/L — LOW (ref 98–107)
CO2 SERPL-SCNC: 22 MMOL/L — SIGNIFICANT CHANGE UP (ref 22–31)
CREAT SERPL-MCNC: 5.03 MG/DL — HIGH (ref 0.5–1.3)
GLUCOSE SERPL-MCNC: 113 MG/DL — HIGH (ref 70–99)
HCT VFR BLD CALC: 36.6 % — LOW (ref 39–50)
HGB BLD-MCNC: 10.7 G/DL — LOW (ref 13–17)
INR BLD: 2.02 — HIGH (ref 0.88–1.17)
MAGNESIUM SERPL-MCNC: 2.2 MG/DL — SIGNIFICANT CHANGE UP (ref 1.6–2.6)
MCHC RBC-ENTMCNC: 27.6 PG — SIGNIFICANT CHANGE UP (ref 27–34)
MCHC RBC-ENTMCNC: 29.2 % — LOW (ref 32–36)
MCV RBC AUTO: 94.6 FL — SIGNIFICANT CHANGE UP (ref 80–100)
PLATELET # BLD AUTO: 132 K/UL — LOW (ref 150–400)
PMV BLD: 12.1 FL — SIGNIFICANT CHANGE UP (ref 7–13)
POTASSIUM SERPL-MCNC: 4.4 MMOL/L — SIGNIFICANT CHANGE UP (ref 3.5–5.3)
POTASSIUM SERPL-SCNC: 4.4 MMOL/L — SIGNIFICANT CHANGE UP (ref 3.5–5.3)
PROTHROM AB SERPL-ACNC: 22.9 SEC — HIGH (ref 9.8–13.1)
RBC # BLD: 3.87 M/UL — LOW (ref 4.2–5.8)
RBC # FLD: 17.8 % — HIGH (ref 10.3–14.5)
SODIUM SERPL-SCNC: 135 MMOL/L — SIGNIFICANT CHANGE UP (ref 135–145)
WBC # BLD: 5.06 K/UL — SIGNIFICANT CHANGE UP (ref 3.8–10.5)
WBC # FLD AUTO: 5.06 K/UL — SIGNIFICANT CHANGE UP (ref 3.8–10.5)

## 2017-05-13 RX ADMIN — Medication 7.45 MICROGRAM(S)/KG/MIN: at 21:18

## 2017-05-13 RX ADMIN — Medication 100 MILLIGRAM(S): at 16:11

## 2017-05-13 RX ADMIN — MIDODRINE HYDROCHLORIDE 30 MILLIGRAM(S): 2.5 TABLET ORAL at 16:13

## 2017-05-13 RX ADMIN — SEVELAMER CARBONATE 800 MILLIGRAM(S): 2400 POWDER, FOR SUSPENSION ORAL at 17:35

## 2017-05-13 RX ADMIN — SEVELAMER CARBONATE 800 MILLIGRAM(S): 2400 POWDER, FOR SUSPENSION ORAL at 09:03

## 2017-05-13 RX ADMIN — FINASTERIDE 5 MILLIGRAM(S): 5 TABLET, FILM COATED ORAL at 16:12

## 2017-05-13 RX ADMIN — MIDODRINE HYDROCHLORIDE 30 MILLIGRAM(S): 2.5 TABLET ORAL at 21:18

## 2017-05-13 RX ADMIN — Medication 325 MILLIGRAM(S): at 16:12

## 2017-05-13 RX ADMIN — ATORVASTATIN CALCIUM 20 MILLIGRAM(S): 80 TABLET, FILM COATED ORAL at 21:17

## 2017-05-13 RX ADMIN — MIDODRINE HYDROCHLORIDE 30 MILLIGRAM(S): 2.5 TABLET ORAL at 06:21

## 2017-05-13 RX ADMIN — AMIODARONE HYDROCHLORIDE 200 MILLIGRAM(S): 400 TABLET ORAL at 06:22

## 2017-05-13 RX ADMIN — Medication 75 MICROGRAM(S): at 06:21

## 2017-05-13 RX ADMIN — Medication 7.45 MICROGRAM(S)/KG/MIN: at 08:00

## 2017-05-14 LAB
BUN SERPL-MCNC: 22 MG/DL — SIGNIFICANT CHANGE UP (ref 7–23)
CALCIUM SERPL-MCNC: 9.2 MG/DL — SIGNIFICANT CHANGE UP (ref 8.4–10.5)
CHLORIDE SERPL-SCNC: 95 MMOL/L — LOW (ref 98–107)
CO2 SERPL-SCNC: 35 MMOL/L — HIGH (ref 22–31)
CREAT SERPL-MCNC: 4.56 MG/DL — HIGH (ref 0.5–1.3)
GLUCOSE SERPL-MCNC: 160 MG/DL — HIGH (ref 70–99)
HCT VFR BLD CALC: 36.6 % — LOW (ref 39–50)
HGB BLD-MCNC: 10.8 G/DL — LOW (ref 13–17)
INR BLD: 1.91 — HIGH (ref 0.88–1.17)
MAGNESIUM SERPL-MCNC: 2.1 MG/DL — SIGNIFICANT CHANGE UP (ref 1.6–2.6)
MCHC RBC-ENTMCNC: 28.2 PG — SIGNIFICANT CHANGE UP (ref 27–34)
MCHC RBC-ENTMCNC: 29.5 % — LOW (ref 32–36)
MCV RBC AUTO: 95.6 FL — SIGNIFICANT CHANGE UP (ref 80–100)
PLATELET # BLD AUTO: 133 K/UL — LOW (ref 150–400)
PMV BLD: 12.2 FL — SIGNIFICANT CHANGE UP (ref 7–13)
POTASSIUM SERPL-MCNC: 4.7 MMOL/L — SIGNIFICANT CHANGE UP (ref 3.5–5.3)
POTASSIUM SERPL-SCNC: 4.7 MMOL/L — SIGNIFICANT CHANGE UP (ref 3.5–5.3)
PROTHROM AB SERPL-ACNC: 21.7 SEC — HIGH (ref 9.8–13.1)
RBC # BLD: 3.83 M/UL — LOW (ref 4.2–5.8)
RBC # FLD: 17.8 % — HIGH (ref 10.3–14.5)
SODIUM SERPL-SCNC: 134 MMOL/L — LOW (ref 135–145)
WBC # BLD: 5.49 K/UL — SIGNIFICANT CHANGE UP (ref 3.8–10.5)
WBC # FLD AUTO: 5.49 K/UL — SIGNIFICANT CHANGE UP (ref 3.8–10.5)

## 2017-05-14 RX ADMIN — Medication 325 MILLIGRAM(S): at 11:09

## 2017-05-14 RX ADMIN — Medication 75 MICROGRAM(S): at 05:05

## 2017-05-14 RX ADMIN — Medication 7.45 MICROGRAM(S)/KG/MIN: at 07:30

## 2017-05-14 RX ADMIN — FINASTERIDE 5 MILLIGRAM(S): 5 TABLET, FILM COATED ORAL at 11:09

## 2017-05-14 RX ADMIN — Medication 7.45 MICROGRAM(S)/KG/MIN: at 13:28

## 2017-05-14 RX ADMIN — SEVELAMER CARBONATE 800 MILLIGRAM(S): 2400 POWDER, FOR SUSPENSION ORAL at 08:27

## 2017-05-14 RX ADMIN — SEVELAMER CARBONATE 800 MILLIGRAM(S): 2400 POWDER, FOR SUSPENSION ORAL at 17:08

## 2017-05-14 RX ADMIN — AMIODARONE HYDROCHLORIDE 200 MILLIGRAM(S): 400 TABLET ORAL at 05:05

## 2017-05-14 RX ADMIN — ATORVASTATIN CALCIUM 20 MILLIGRAM(S): 80 TABLET, FILM COATED ORAL at 22:42

## 2017-05-14 RX ADMIN — MIDODRINE HYDROCHLORIDE 30 MILLIGRAM(S): 2.5 TABLET ORAL at 05:05

## 2017-05-14 RX ADMIN — MIDODRINE HYDROCHLORIDE 30 MILLIGRAM(S): 2.5 TABLET ORAL at 22:42

## 2017-05-14 RX ADMIN — MIDODRINE HYDROCHLORIDE 30 MILLIGRAM(S): 2.5 TABLET ORAL at 13:25

## 2017-05-14 RX ADMIN — Medication 100 MILLIGRAM(S): at 11:09

## 2017-05-14 RX ADMIN — SEVELAMER CARBONATE 800 MILLIGRAM(S): 2400 POWDER, FOR SUSPENSION ORAL at 12:37

## 2017-05-15 LAB
BUN SERPL-MCNC: 31 MG/DL — HIGH (ref 7–23)
CALCIUM SERPL-MCNC: 9.3 MG/DL — SIGNIFICANT CHANGE UP (ref 8.4–10.5)
CHLORIDE SERPL-SCNC: 97 MMOL/L — LOW (ref 98–107)
CO2 SERPL-SCNC: 23 MMOL/L — SIGNIFICANT CHANGE UP (ref 22–31)
CREAT SERPL-MCNC: 5.26 MG/DL — HIGH (ref 0.5–1.3)
GLUCOSE SERPL-MCNC: 107 MG/DL — HIGH (ref 70–99)
HCT VFR BLD CALC: 36.4 % — LOW (ref 39–50)
HGB BLD-MCNC: 10.9 G/DL — LOW (ref 13–17)
INR BLD: 1.67 — HIGH (ref 0.88–1.17)
MAGNESIUM SERPL-MCNC: 2.2 MG/DL — SIGNIFICANT CHANGE UP (ref 1.6–2.6)
MCHC RBC-ENTMCNC: 28.2 PG — SIGNIFICANT CHANGE UP (ref 27–34)
MCHC RBC-ENTMCNC: 29.9 % — LOW (ref 32–36)
MCV RBC AUTO: 94.1 FL — SIGNIFICANT CHANGE UP (ref 80–100)
PLATELET # BLD AUTO: 126 K/UL — LOW (ref 150–400)
PMV BLD: 11.7 FL — SIGNIFICANT CHANGE UP (ref 7–13)
POTASSIUM SERPL-MCNC: 4.6 MMOL/L — SIGNIFICANT CHANGE UP (ref 3.5–5.3)
POTASSIUM SERPL-SCNC: 4.6 MMOL/L — SIGNIFICANT CHANGE UP (ref 3.5–5.3)
PROTHROM AB SERPL-ACNC: 18.9 SEC — HIGH (ref 9.8–13.1)
RBC # BLD: 3.87 M/UL — LOW (ref 4.2–5.8)
RBC # FLD: 17.5 % — HIGH (ref 10.3–14.5)
SODIUM SERPL-SCNC: 135 MMOL/L — SIGNIFICANT CHANGE UP (ref 135–145)
WBC # BLD: 5.24 K/UL — SIGNIFICANT CHANGE UP (ref 3.8–10.5)
WBC # FLD AUTO: 5.24 K/UL — SIGNIFICANT CHANGE UP (ref 3.8–10.5)

## 2017-05-15 PROCEDURE — 99233 SBSQ HOSP IP/OBS HIGH 50: CPT

## 2017-05-15 RX ADMIN — Medication 75 MICROGRAM(S): at 06:16

## 2017-05-15 RX ADMIN — SEVELAMER CARBONATE 800 MILLIGRAM(S): 2400 POWDER, FOR SUSPENSION ORAL at 18:19

## 2017-05-15 RX ADMIN — Medication 7.45 MICROGRAM(S)/KG/MIN: at 18:19

## 2017-05-15 RX ADMIN — MIDODRINE HYDROCHLORIDE 30 MILLIGRAM(S): 2.5 TABLET ORAL at 06:16

## 2017-05-15 RX ADMIN — FINASTERIDE 5 MILLIGRAM(S): 5 TABLET, FILM COATED ORAL at 14:55

## 2017-05-15 RX ADMIN — MIDODRINE HYDROCHLORIDE 30 MILLIGRAM(S): 2.5 TABLET ORAL at 22:55

## 2017-05-15 RX ADMIN — SEVELAMER CARBONATE 800 MILLIGRAM(S): 2400 POWDER, FOR SUSPENSION ORAL at 14:56

## 2017-05-15 RX ADMIN — Medication 325 MILLIGRAM(S): at 12:33

## 2017-05-15 RX ADMIN — MIDODRINE HYDROCHLORIDE 30 MILLIGRAM(S): 2.5 TABLET ORAL at 14:56

## 2017-05-15 RX ADMIN — ATORVASTATIN CALCIUM 20 MILLIGRAM(S): 80 TABLET, FILM COATED ORAL at 22:55

## 2017-05-15 RX ADMIN — Medication 100 MILLIGRAM(S): at 12:33

## 2017-05-15 RX ADMIN — AMIODARONE HYDROCHLORIDE 200 MILLIGRAM(S): 400 TABLET ORAL at 07:19

## 2017-05-16 LAB
BUN SERPL-MCNC: 42 MG/DL — HIGH (ref 7–23)
CALCIUM SERPL-MCNC: 9.7 MG/DL — SIGNIFICANT CHANGE UP (ref 8.4–10.5)
CHLORIDE SERPL-SCNC: 94 MMOL/L — LOW (ref 98–107)
CO2 SERPL-SCNC: 27 MMOL/L — SIGNIFICANT CHANGE UP (ref 22–31)
CREAT SERPL-MCNC: 6.64 MG/DL — HIGH (ref 0.5–1.3)
GLUCOSE SERPL-MCNC: 109 MG/DL — HIGH (ref 70–99)
HCT VFR BLD CALC: 36.5 % — LOW (ref 39–50)
HGB BLD-MCNC: 11.1 G/DL — LOW (ref 13–17)
INR BLD: 1.51 — HIGH (ref 0.88–1.17)
MAGNESIUM SERPL-MCNC: 2.4 MG/DL — SIGNIFICANT CHANGE UP (ref 1.6–2.6)
MCHC RBC-ENTMCNC: 28.5 PG — SIGNIFICANT CHANGE UP (ref 27–34)
MCHC RBC-ENTMCNC: 30.4 % — LOW (ref 32–36)
MCV RBC AUTO: 93.6 FL — SIGNIFICANT CHANGE UP (ref 80–100)
PLATELET # BLD AUTO: 142 K/UL — LOW (ref 150–400)
PMV BLD: 12.9 FL — SIGNIFICANT CHANGE UP (ref 7–13)
POTASSIUM SERPL-MCNC: 5 MMOL/L — SIGNIFICANT CHANGE UP (ref 3.5–5.3)
POTASSIUM SERPL-SCNC: 5 MMOL/L — SIGNIFICANT CHANGE UP (ref 3.5–5.3)
PROTHROM AB SERPL-ACNC: 17 SEC — HIGH (ref 9.8–13.1)
RBC # BLD: 3.9 M/UL — LOW (ref 4.2–5.8)
RBC # FLD: 17.5 % — HIGH (ref 10.3–14.5)
SODIUM SERPL-SCNC: 133 MMOL/L — LOW (ref 135–145)
WBC # BLD: 4.51 K/UL — SIGNIFICANT CHANGE UP (ref 3.8–10.5)
WBC # FLD AUTO: 4.51 K/UL — SIGNIFICANT CHANGE UP (ref 3.8–10.5)

## 2017-05-16 PROCEDURE — 99233 SBSQ HOSP IP/OBS HIGH 50: CPT

## 2017-05-16 PROCEDURE — 93451 RIGHT HEART CATH: CPT | Mod: 26

## 2017-05-16 RX ORDER — DOBUTAMINE HCL 250MG/20ML
7 VIAL (ML) INTRAVENOUS
Qty: 500 | Refills: 0 | Status: DISCONTINUED | OUTPATIENT
Start: 2017-05-16 | End: 2017-06-23

## 2017-05-16 RX ORDER — WARFARIN SODIUM 2.5 MG/1
2 TABLET ORAL ONCE
Qty: 0 | Refills: 0 | Status: COMPLETED | OUTPATIENT
Start: 2017-05-16 | End: 2017-05-16

## 2017-05-16 RX ADMIN — WARFARIN SODIUM 2 MILLIGRAM(S): 2.5 TABLET ORAL at 18:36

## 2017-05-16 RX ADMIN — Medication 75 MICROGRAM(S): at 05:34

## 2017-05-16 RX ADMIN — Medication 325 MILLIGRAM(S): at 18:36

## 2017-05-16 RX ADMIN — MIDODRINE HYDROCHLORIDE 30 MILLIGRAM(S): 2.5 TABLET ORAL at 05:34

## 2017-05-16 RX ADMIN — AMIODARONE HYDROCHLORIDE 200 MILLIGRAM(S): 400 TABLET ORAL at 05:34

## 2017-05-16 RX ADMIN — MIDODRINE HYDROCHLORIDE 30 MILLIGRAM(S): 2.5 TABLET ORAL at 13:24

## 2017-05-16 RX ADMIN — SEVELAMER CARBONATE 800 MILLIGRAM(S): 2400 POWDER, FOR SUSPENSION ORAL at 18:36

## 2017-05-16 RX ADMIN — SEVELAMER CARBONATE 800 MILLIGRAM(S): 2400 POWDER, FOR SUSPENSION ORAL at 09:22

## 2017-05-16 RX ADMIN — Medication 14.89 MICROGRAM(S)/KG/MIN: at 19:03

## 2017-05-17 DIAGNOSIS — Z51.5 ENCOUNTER FOR PALLIATIVE CARE: ICD-10-CM

## 2017-05-17 DIAGNOSIS — R06.00 DYSPNEA, UNSPECIFIED: ICD-10-CM

## 2017-05-17 DIAGNOSIS — R53.81 OTHER MALAISE: ICD-10-CM

## 2017-05-17 LAB
BUN SERPL-MCNC: 28 MG/DL — HIGH (ref 7–23)
CALCIUM SERPL-MCNC: 9.3 MG/DL — SIGNIFICANT CHANGE UP (ref 8.4–10.5)
CHLORIDE SERPL-SCNC: 98 MMOL/L — SIGNIFICANT CHANGE UP (ref 98–107)
CO2 SERPL-SCNC: 26 MMOL/L — SIGNIFICANT CHANGE UP (ref 22–31)
CREAT SERPL-MCNC: 5.02 MG/DL — HIGH (ref 0.5–1.3)
GLUCOSE SERPL-MCNC: 141 MG/DL — HIGH (ref 70–99)
HCT VFR BLD CALC: 35.2 % — LOW (ref 39–50)
HGB BLD-MCNC: 10.7 G/DL — LOW (ref 13–17)
INR BLD: 1.51 — HIGH (ref 0.88–1.17)
MAGNESIUM SERPL-MCNC: 2.2 MG/DL — SIGNIFICANT CHANGE UP (ref 1.6–2.6)
MCHC RBC-ENTMCNC: 28.5 PG — SIGNIFICANT CHANGE UP (ref 27–34)
MCHC RBC-ENTMCNC: 30.4 % — LOW (ref 32–36)
MCV RBC AUTO: 93.6 FL — SIGNIFICANT CHANGE UP (ref 80–100)
PHOSPHATE SERPL-MCNC: 3.1 MG/DL — SIGNIFICANT CHANGE UP (ref 2.5–4.5)
PLATELET # BLD AUTO: 147 K/UL — LOW (ref 150–400)
PMV BLD: 12.7 FL — SIGNIFICANT CHANGE UP (ref 7–13)
POTASSIUM SERPL-MCNC: 5.1 MMOL/L — SIGNIFICANT CHANGE UP (ref 3.5–5.3)
POTASSIUM SERPL-SCNC: 5.1 MMOL/L — SIGNIFICANT CHANGE UP (ref 3.5–5.3)
PROTHROM AB SERPL-ACNC: 17.1 SEC — HIGH (ref 9.8–13.1)
RBC # BLD: 3.76 M/UL — LOW (ref 4.2–5.8)
RBC # FLD: 17.6 % — HIGH (ref 10.3–14.5)
SODIUM SERPL-SCNC: 137 MMOL/L — SIGNIFICANT CHANGE UP (ref 135–145)
WBC # BLD: 5.55 K/UL — SIGNIFICANT CHANGE UP (ref 3.8–10.5)
WBC # FLD AUTO: 5.55 K/UL — SIGNIFICANT CHANGE UP (ref 3.8–10.5)

## 2017-05-17 PROCEDURE — 99497 ADVNCD CARE PLAN 30 MIN: CPT | Mod: 25

## 2017-05-17 PROCEDURE — 99223 1ST HOSP IP/OBS HIGH 75: CPT | Mod: 25

## 2017-05-17 PROCEDURE — 99232 SBSQ HOSP IP/OBS MODERATE 35: CPT

## 2017-05-17 PROCEDURE — 90792 PSYCH DIAG EVAL W/MED SRVCS: CPT

## 2017-05-17 RX ORDER — WARFARIN SODIUM 2.5 MG/1
3 TABLET ORAL ONCE
Qty: 0 | Refills: 0 | Status: COMPLETED | OUTPATIENT
Start: 2017-05-17 | End: 2017-05-17

## 2017-05-17 RX ADMIN — SEVELAMER CARBONATE 800 MILLIGRAM(S): 2400 POWDER, FOR SUSPENSION ORAL at 08:49

## 2017-05-17 RX ADMIN — Medication 100 MILLIGRAM(S): at 11:37

## 2017-05-17 RX ADMIN — FINASTERIDE 5 MILLIGRAM(S): 5 TABLET, FILM COATED ORAL at 11:37

## 2017-05-17 RX ADMIN — MIDODRINE HYDROCHLORIDE 30 MILLIGRAM(S): 2.5 TABLET ORAL at 00:40

## 2017-05-17 RX ADMIN — ATORVASTATIN CALCIUM 20 MILLIGRAM(S): 80 TABLET, FILM COATED ORAL at 00:40

## 2017-05-17 RX ADMIN — Medication 14.9 MICROGRAM(S)/KG/MIN: at 00:40

## 2017-05-17 RX ADMIN — AMIODARONE HYDROCHLORIDE 200 MILLIGRAM(S): 400 TABLET ORAL at 05:45

## 2017-05-17 RX ADMIN — Medication 14.9 MICROGRAM(S)/KG/MIN: at 08:00

## 2017-05-17 RX ADMIN — WARFARIN SODIUM 3 MILLIGRAM(S): 2.5 TABLET ORAL at 17:01

## 2017-05-17 RX ADMIN — ATORVASTATIN CALCIUM 20 MILLIGRAM(S): 80 TABLET, FILM COATED ORAL at 23:57

## 2017-05-17 RX ADMIN — SEVELAMER CARBONATE 800 MILLIGRAM(S): 2400 POWDER, FOR SUSPENSION ORAL at 11:37

## 2017-05-17 RX ADMIN — Medication 14.9 MICROGRAM(S)/KG/MIN: at 20:02

## 2017-05-17 RX ADMIN — SEVELAMER CARBONATE 800 MILLIGRAM(S): 2400 POWDER, FOR SUSPENSION ORAL at 16:51

## 2017-05-17 RX ADMIN — MIDODRINE HYDROCHLORIDE 30 MILLIGRAM(S): 2.5 TABLET ORAL at 23:57

## 2017-05-17 RX ADMIN — MIDODRINE HYDROCHLORIDE 30 MILLIGRAM(S): 2.5 TABLET ORAL at 16:51

## 2017-05-17 RX ADMIN — Medication 75 MICROGRAM(S): at 05:45

## 2017-05-17 RX ADMIN — MIDODRINE HYDROCHLORIDE 30 MILLIGRAM(S): 2.5 TABLET ORAL at 08:49

## 2017-05-17 NOTE — PROGRESS NOTE ADULT - ASSESSMENT
Patient is a 62y old male w/ PMHX of systolic HF on dobutamine and ESRD on HD. Today he states he has a little bit more energy and less SOB since increase in dobutamine and daily dialysis. Stable on current dose of dobutamine.

## 2017-05-17 NOTE — PROGRESS NOTE ADULT - PROBLEM SELECTOR PLAN 1
HD yesterday, with UF 2.9kg achieved.   Flow sheet reviewed. CHF note reviewed.  Plan for isolated UF today to help optimize fluid status.   HD again tomorrow.

## 2017-05-17 NOTE — PROGRESS NOTE ADULT - SUBJECTIVE AND OBJECTIVE BOX
[facility] - PROGRESS NOTE    Patient is a 62y Male with severe heart failure, ESRD on HD.     No acute events overnight. Pt reports improved dyspnea.     Hospital Medications:   MEDICATIONS  (STANDING):  finasteride 5milliGRAM(s) Oral daily  amiodarone    Tablet 200milliGRAM(s) Oral daily  atorvastatin 20milliGRAM(s) Oral at bedtime  docusate sodium 100milliGRAM(s) Oral daily  midodrine 30milliGRAM(s) Oral every 8 hours  sevelamer hydrochloride 800milliGRAM(s) Oral three times a day with meals  levothyroxine 75MICROGram(s) Oral daily  insulin lispro (HumaLOG) corrective regimen sliding scale  SubCutaneous three times a day before meals  insulin lispro (HumaLOG) corrective regimen sliding scale  SubCutaneous at bedtime  dextrose 5%. 1000milliLiter(s) IV Continuous <Continuous>  dextrose 50% Injectable 12.5Gram(s) IV Push once  dextrose 50% Injectable 25Gram(s) IV Push once  dextrose 50% Injectable 25Gram(s) IV Push once  DOBUTamine Infusion 5.002MICROgram(s)/kG/Min IV Continuous <Continuous>    VITALS:  T(F): 98.1, Max: 98.2 (05-16 @ 19:40)  HR: 96  BP: 107/65  RR: 18  SpO2: 98%  Wt(kg): --    I & Os for current day (as of 05-17 @ 10:30)  =============================================  IN: 1058 ml / OUT: 3500 ml / NET: -2442 ml      PHYSICAL EXAM:  Constitutional: NAD  HEENT: anicteric sclera, oropharynx clear, MMM  Neck: + JVD  Respiratory: Bibasilar crackles   Cardiovascular: S1, S2, RRR  Gastrointestinal: BS+, soft, NT. Distended.   Extremities: No cyanosis or clubbing. 2+ pitting edema in b/l LE   Neurological: A/O x 3, no focal deficits  Psychiatric: Normal mood, normal affect  : No CVA tenderness. No rosa.   Skin: No rashes  Vascular Access: RIJ Tunneled cath     LABS:  05-17    137  |  98  |  28<H>  ----------------------------<  141<H>  5.1   |  26  |  5.02<H>    Ca    9.3      17 May 2017 05:53  Phos  3.1     05-17  Mg     2.2     05-17      Creatinine Trend: 5.02 <--, 6.64 <--, 5.26 <--, 4.56 <--, 5.03 <--, 3.87 <--, 4.67 <--                        10.7   5.55  )-----------( 147      ( 17 May 2017 05:53 )             35.2

## 2017-05-17 NOTE — PROGRESS NOTE ADULT - PROBLEM SELECTOR PLAN 1
Continue with dobutamine to 5mcg/kg/min. Pt is dependent on dobutamine.  Max fluid removal with HD  Had a long discussion with patient's sister on request per pt regarding his condition.   Will follow up psych and paliative evaluations.

## 2017-05-17 NOTE — PROGRESS NOTE ADULT - SUBJECTIVE AND OBJECTIVE BOX
Patient is a 62y old male w/ PMHX of systolic HF on dobutamine and ESRD on HD. Today he states he has a little bit more energy and less SOB since increase in dobutamine and daily dialysis. Currently he is on supplemental O2, not SOB, orthopnea, PND, CP or palpitations. No HAYWOOD with the minimal activities he does in bed or transferring to chair.       INTERVAL HPI/OVERNIGHT EVENTS:    MEDICATIONS  (STANDING):  finasteride 5milliGRAM(s) Oral daily  amiodarone    Tablet 200milliGRAM(s) Oral daily  atorvastatin 20milliGRAM(s) Oral at bedtime  docusate sodium 100milliGRAM(s) Oral daily  midodrine 30milliGRAM(s) Oral every 8 hours  sevelamer hydrochloride 800milliGRAM(s) Oral three times a day with meals  levothyroxine 75MICROGram(s) Oral daily  insulin lispro (HumaLOG) corrective regimen sliding scale  SubCutaneous three times a day before meals  insulin lispro (HumaLOG) corrective regimen sliding scale  SubCutaneous at bedtime  dextrose 5%. 1000milliLiter(s) IV Continuous <Continuous>  dextrose 50% Injectable 12.5Gram(s) IV Push once  dextrose 50% Injectable 25Gram(s) IV Push once  dextrose 50% Injectable 25Gram(s) IV Push once  DOBUTamine Infusion 5.002MICROgram(s)/kG/Min IV Continuous <Continuous>  warfarin 3milliGRAM(s) Oral once    MEDICATIONS  (PRN):  dextrose Gel 1Dose(s) Oral once PRN Blood Glucose LESS THAN 70 milliGRAM(s)/deciliter  glucagon  Injectable 1milliGRAM(s) IntraMuscular once PRN Glucose LESS THAN 70 milligrams/deciliter      Allergies    No Known Allergies    Intolerances      Height (cm): 180.3 (05-09 @ 10:29), 180.3 (04-18 @ 04:25)  Weight (kg): 99.3 (05-09 @ 10:29), 102.1 (04-18 @ 04:25)  BMI (kg/m2): 30.5 (05-09 @ 10:29), 31.4 (04-18 @ 04:25)  BSA (m2): 2.19 (05-09 @ 10:29), 2.22 (04-18 @ 04:25)  Vital Signs Last 24 Hrs  T(C): 36.8, Max: 36.8 (05-16 @ 19:40)  T(F): 98.2, Max: 98.2 (05-16 @ 19:40)  HR: 89 (79 - 96)  BP: 110/68 (99/54 - 110/68)  BP(mean): --  RR: 18 (17 - 18)  SpO2: 98% (93% - 99%)      PHYSICAL EXAM:  GENERAL:  NAD  NECK:  Moderate JVD    LUNGS: bibasilar crackles  HEART:  S1/S2 RRR   ABDOMEN:  soft, nontender, nondistended, positive bowel sounds    EXTREMITIES: 1+ LE edema   SKIN: warm, dry  PSYCH: A&O x 3    LABS:                        10.7   5.55  )-----------( 147      ( 17 May 2017 05:53 )             35.2     17 May 2017 05:53    137    |  98     |  28     ----------------------------<  141    5.1     |  26     |  5.02     Ca    9.3        17 May 2017 05:53  Phos  3.1       17 May 2017 05:53  Mg     2.2       17 May 2017 05:53

## 2017-05-17 NOTE — CONSULT NOTE ADULT - SUBJECTIVE AND OBJECTIVE BOX
HPI:  63 y/o male with a PMHx of NICM with severe LV dysfunction (EF 19%) S/P Biotronic ICD placement on Dobutamine gtt via chronic left upper extremity PICC line, ESRD on HD M/W/F, atrial fibrillation on Coumadin, COPD on 4L home O2, HLD, DM, chronic hypotension on Midodrine, p/w syncopal episode. Pt was admitted for syncope and acute on chronic CHF exacerbation. Pt is being followed by heart failure team and nephrology. His dose of dobutamine has been increased and has been required more frequent dialysis (HD and ultrafiltration) to optimize fluid status. Pt is DNR/DNI and palliative care consulted for further goals of care discussions    Pt lives with his wife who is his primary caregiver and has a visiting nurse that comes weekly to change the dressing of his PICC line; he ambulates with a walker.     PERTINENT PMH REVIEWED:  [ ] YES [ ] NO           SOCIAL HISTORY:  Significant other/partner:                     Children:                         Gnosticist/Spirituality:  Subtance hx:                       Tobacco hx:                        Alcohol hx:                 Home Opiod hx:    FAMILY HISTORY:  No pertinent family history in first degree relatives      Baseline ADLs (prior to admission):  Independent [ ] moderately [ ] fully   Dependent   [ ] moderately [ ]fully    ADVANCE DIRECTIVES:  [ ] YES [ ] NO   DNR [ ] YES [ ] NO  Completed on:                     MOLST  [ ] YES [ ] NO   Completed on:  Living Will  [ ] YES [ ] NO   Completed on:    Surrogate/HCP/Guardian: [ ] YES [ ] NO                                Phone#:    MEDICATIONS  (STANDING):  finasteride 5milliGRAM(s) Oral daily  amiodarone    Tablet 200milliGRAM(s) Oral daily  atorvastatin 20milliGRAM(s) Oral at bedtime  docusate sodium 100milliGRAM(s) Oral daily  midodrine 30milliGRAM(s) Oral every 8 hours  sevelamer hydrochloride 800milliGRAM(s) Oral three times a day with meals  levothyroxine 75MICROGram(s) Oral daily  insulin lispro (HumaLOG) corrective regimen sliding scale  SubCutaneous three times a day before meals  insulin lispro (HumaLOG) corrective regimen sliding scale  SubCutaneous at bedtime  dextrose 5%. 1000milliLiter(s) IV Continuous <Continuous>  dextrose 50% Injectable 12.5Gram(s) IV Push once  dextrose 50% Injectable 25Gram(s) IV Push once  dextrose 50% Injectable 25Gram(s) IV Push once  DOBUTamine Infusion 5.002MICROgram(s)/kG/Min IV Continuous <Continuous>    MEDICATIONS  (PRN):  dextrose Gel 1Dose(s) Oral once PRN Blood Glucose LESS THAN 70 milliGRAM(s)/deciliter  glucagon  Injectable 1milliGRAM(s) IntraMuscular once PRN Glucose LESS THAN 70 milligrams/deciliter      Allergies    No Known Allergies    Intolerances        REVIEW OF SYSTEMS      General:	    Skin/Breast:  	  Ophthalmologic:  	  ENMT:	    Respiratory and Thorax:  	  Cardiovascular:	    Gastrointestinal:	    Genitourinary:	    Musculoskeletal:	    Neurological:	    Psychiatric:	    Hematology/Lymphatics:	    Endocrine:	    Allergic/Immunologic:	    Other Symptoms:    [ ] Unable to obtain due to poor mentation     Karnofsky Performance Score/Palliative Performance Status Version 2:         %    Vital Signs Last 24 Hrs  T(C): 36.8, Max: 36.8 (05-16 @ 19:40)  T(F): 98.2, Max: 98.2 (05-16 @ 19:40)  HR: 89 (79 - 96)  BP: 110/68 (99/54 - 110/68)  BP(mean): --  RR: 18 (17 - 18)  SpO2: 98% (93% - 99%)    PHYSICAL EXAM:      Constitutional:    Eyes:    ENMT:    Neck:    Breasts:    Back:    Respiratory:    Cardiovascular:    Gastrointestinal:    Genitourinary:    Rectal:    Extremities:    Vascular:    Neurological:    Skin:    Lymph Nodes:    Musculoskeletal:    Psychiatric:        LABS:                        10.7   5.55  )-----------( 147      ( 17 May 2017 05:53 )             35.2     05-17    137  |  98  |  28<H>  ----------------------------<  141<H>  5.1   |  26  |  5.02<H>    Ca    9.3      17 May 2017 05:53  Phos  3.1     05-17  Mg     2.2     05-17      PT/INR - ( 17 May 2017 05:53 )   PT: 17.1 SEC;   INR: 1.51              I&O's Summary    I & Os for current day (as of 17 May 2017 14:10)  =============================================  IN: 1058 ml / OUT: 3500 ml / NET: -4442 ml      RADIOLOGY & ADDITIONAL STUDIES: HPI:  63 y/o male with a PMHx of NICM with severe LV dysfunction (EF 19%) S/P Biotronic ICD placement on Dobutamine gtt via chronic left upper extremity PICC line, ESRD on HD M/W/F, atrial fibrillation on Coumadin, COPD on 4L home O2, HLD, DM, chronic hypotension on Midodrine, p/w syncopal episode. Pt was admitted for syncope and acute on chronic CHF exacerbation. Pt is being followed by heart failure team and nephrology. His dose of dobutamine has been increased and has been required more frequent dialysis (HD and ultrafiltration) to optimize fluid status. Pt is DNR/DNI and palliative care consulted for further goals of care discussions    Pt lives with his wife who is his primary caregiver and has a visiting nurse that comes weekly to change the dressing of his PICC line; he ambulates with a walker.     PERTINENT PMH REVIEWED:  [ ] YES [ ] NO           SOCIAL HISTORY:  Significant other/partner: Wife                    Children:    5 children              Moravian/Spirituality:  Subtance hx: No              Tobacco hx: Never smoker                       Alcohol hx: No                 Home Opiod hx: No    FAMILY HISTORY:  No pertinent family history in first degree relatives      Baseline ADLs (prior to admission):  Independent: Moderately (needs helping with bathing, able to dress himself, feed himself, ambulates with walker)      ADVANCE DIRECTIVES:  [ ] YES [ ] NO   DNR [x] YES  Completed on: 5/9/17                    MOLST  [ ] YES [x] NO   Completed on:  Living Will  [ ] YES [x ] NO   Completed on:    Surrogate/HCP/Guardian: [x ] YES: Wife (Otto Plascencia)                   Phone#: [phone]    MEDICATIONS  (STANDING):  finasteride 5milliGRAM(s) Oral daily  amiodarone    Tablet 200milliGRAM(s) Oral daily  atorvastatin 20milliGRAM(s) Oral at bedtime  docusate sodium 100milliGRAM(s) Oral daily  midodrine 30milliGRAM(s) Oral every 8 hours  sevelamer hydrochloride 800milliGRAM(s) Oral three times a day with meals  levothyroxine 75MICROGram(s) Oral daily  insulin lispro (HumaLOG) corrective regimen sliding scale  SubCutaneous three times a day before meals  insulin lispro (HumaLOG) corrective regimen sliding scale  SubCutaneous at bedtime  dextrose 5%. 1000milliLiter(s) IV Continuous <Continuous>  dextrose 50% Injectable 12.5Gram(s) IV Push once  dextrose 50% Injectable 25Gram(s) IV Push once  dextrose 50% Injectable 25Gram(s) IV Push once  DOBUTamine Infusion 5.002MICROgram(s)/kG/Min IV Continuous <Continuous>    MEDICATIONS  (PRN):  dextrose Gel 1Dose(s) Oral once PRN Blood Glucose LESS THAN 70 milliGRAM(s)/deciliter  glucagon  Injectable 1milliGRAM(s) IntraMuscular once PRN Glucose LESS THAN 70 milligrams/deciliter      Allergies    No Known Allergies    Intolerances    REVIEW OF SYSTEMS    General: no fevers, chills or weight loss	    Skin/Breast: no rash    Respiratory and Thorax: + dyspnea on exertion  	  Cardiovascular: no chest pain	    Gastrointestinal:	 no nausea, vomiting, abdominal pain, or constipation	    Musculoskeletal: L shoulder pain	    Neurological: no headaches or dizziness	    Psychiatric: no anxiety/depression	    Other Symptoms:    [ ] Unable to obtain due to poor mentation     Karnofsky Performance Score/Palliative Performance Status Version 2:    30     %    Vital Signs Last 24 Hrs  T(C): 36.8, Max: 36.8 (05-16 @ 19:40)  T(F): 98.2, Max: 98.2 (05-16 @ 19:40)  HR: 89 (79 - 96)  BP: 110/68 (99/54 - 110/68)  BP(mean): --  RR: 18 (17 - 18)  SpO2: 98% (93% - 99%)    PHYSICAL EXAM:      Constitutional: resting in bed, no apparent distress    Eyes: EOMI, PERRLA    Respiratory: mildly dyspneic, bibasilar crackles    Cardiovascular: RRR, S1,S2, b/l LE 2+ pitting edema    Gastrointestinal: +BS, soft, NT/ND    Extremities: no clubbing, b/l 2+ pitting LE edema    Neurological: AAO x 3, no focal deficits    Skin: warm and ry    Psychiatric: calm and pleasant       LABS:                        10.7   5.55  )-----------( 147      ( 17 May 2017 05:53 )             35.2     05-17    137  |  98  |  28<H>  ----------------------------<  141<H>  5.1   |  26  |  5.02<H>    Ca    9.3      17 May 2017 05:53  Phos  3.1     05-17  Mg     2.2     05-17      PT/INR - ( 17 May 2017 05:53 )   PT: 17.1 SEC;   INR: 1.51              I&O's Summary    I & Os for current day (as of 17 May 2017 14:10)  =============================================  IN: 1058 ml / OUT: 3500 ml / NET: -2442 ml      RADIOLOGY & ADDITIONAL STUDIES:  - Echo and R heart cath results reviewed HPI:  61 y/o male with a PMHx of NICM with severe LV dysfunction (EF 19%) S/P Biotronic ICD placement on Dobutamine gtt via chronic left upper extremity PICC line, ESRD on HD M/W/F, atrial fibrillation on Coumadin, COPD on 4L home O2, HLD, DM, chronic hypotension on Midodrine, p/w syncopal episode. Pt was admitted for syncope and acute on chronic CHF exacerbation. Pt is being followed by heart failure team and nephrology. His dose of dobutamine has been increased and has been requiring more frequent dialysis (HD and ultrafiltration) to optimize fluid status. Pt is DNR/DNI and palliative care consulted for further goals of care discussions    Pt lives with his wife who is his primary caregiver and has a visiting nurse that comes weekly to change the dressing of his PICC line; he ambulates with a walker.     PERTINENT PMH REVIEWED:  [ ] YES [ ] NO           SOCIAL HISTORY:  Significant other/partner: Wife                    Children:    5 children              Episcopal/Spirituality:  Subtance hx: No              Tobacco hx: Never smoker                       Alcohol hx: No                 Home Opiod hx: No    FAMILY HISTORY:  No pertinent family history in first degree relatives      Baseline ADLs (prior to admission):  Independent: Moderately (needs helping with bathing, able to dress himself, feed himself, ambulates with walker)      ADVANCE DIRECTIVES:  [ ] YES [ ] NO   DNR [x] YES  Completed on: 5/9/17                    MOLST  [ ] YES [x] NO   Completed on:  Living Will  [ ] YES [x ] NO   Completed on:    Surrogate/HCP/Guardian: [x ] YES: Wife (Otto Plascencia)                   Phone#: [phone]    MEDICATIONS  (STANDING):  finasteride 5milliGRAM(s) Oral daily  amiodarone    Tablet 200milliGRAM(s) Oral daily  atorvastatin 20milliGRAM(s) Oral at bedtime  docusate sodium 100milliGRAM(s) Oral daily  midodrine 30milliGRAM(s) Oral every 8 hours  sevelamer hydrochloride 800milliGRAM(s) Oral three times a day with meals  levothyroxine 75MICROGram(s) Oral daily  insulin lispro (HumaLOG) corrective regimen sliding scale  SubCutaneous three times a day before meals  insulin lispro (HumaLOG) corrective regimen sliding scale  SubCutaneous at bedtime  dextrose 5%. 1000milliLiter(s) IV Continuous <Continuous>  dextrose 50% Injectable 12.5Gram(s) IV Push once  dextrose 50% Injectable 25Gram(s) IV Push once  dextrose 50% Injectable 25Gram(s) IV Push once  DOBUTamine Infusion 5.002MICROgram(s)/kG/Min IV Continuous <Continuous>    MEDICATIONS  (PRN):  dextrose Gel 1Dose(s) Oral once PRN Blood Glucose LESS THAN 70 milliGRAM(s)/deciliter  glucagon  Injectable 1milliGRAM(s) IntraMuscular once PRN Glucose LESS THAN 70 milligrams/deciliter      Allergies    No Known Allergies    Intolerances    REVIEW OF SYSTEMS    General: no fevers, chills or weight loss	    Skin/Breast: no rash    Respiratory and Thorax: + dyspnea on exertion  	  Cardiovascular: no chest pain	    Gastrointestinal:	 no nausea, vomiting, abdominal pain, or constipation	    Musculoskeletal: L shoulder pain	    Neurological: no headaches or dizziness	    Psychiatric: no anxiety/depression	    Other Symptoms:    [ ] Unable to obtain due to poor mentation     Karnofsky Performance Score/Palliative Performance Status Version 2:    30     %    Vital Signs Last 24 Hrs  T(C): 36.8, Max: 36.8 (05-16 @ 19:40)  T(F): 98.2, Max: 98.2 (05-16 @ 19:40)  HR: 89 (79 - 96)  BP: 110/68 (99/54 - 110/68)  BP(mean): --  RR: 18 (17 - 18)  SpO2: 98% (93% - 99%)    PHYSICAL EXAM:      Constitutional: resting in bed, no apparent distress    Eyes: EOMI, PERRLA    Respiratory: mildly dyspneic, bibasilar crackles    Cardiovascular: RRR, S1,S2, b/l LE 2+ pitting edema    Gastrointestinal: +BS, soft, NT/ND    Extremities: no clubbing, b/l 2+ pitting LE edema    Neurological: AAO x 3, no focal deficits    Skin: warm and ry    Psychiatric: calm and pleasant       LABS:                        10.7   5.55  )-----------( 147      ( 17 May 2017 05:53 )             35.2     05-17    137  |  98  |  28<H>  ----------------------------<  141<H>  5.1   |  26  |  5.02<H>    Ca    9.3      17 May 2017 05:53  Phos  3.1     05-17  Mg     2.2     05-17      PT/INR - ( 17 May 2017 05:53 )   PT: 17.1 SEC;   INR: 1.51              I&O's Summary    I & Os for current day (as of 17 May 2017 14:10)  =============================================  IN: 1058 ml / OUT: 3500 ml / NET: -2442 ml      RADIOLOGY & ADDITIONAL STUDIES:  - Echo and R heart cath results reviewed HPI:  63 y/o male with a PMHx of NICM with severe LV dysfunction (EF 19%) S/P Biotronic ICD placement on Dobutamine gtt via chronic left upper extremity PICC line, ESRD on HD M/W/F, atrial fibrillation on Coumadin, COPD on 4L home O2, HLD, DM, chronic hypotension on Midodrine, p/w syncopal episode. Pt was admitted for syncope and acute on chronic CHF exacerbation. Pt is being followed by heart failure team and nephrology. His dose of dobutamine has been increased and has been requiring more frequent dialysis (HD and ultrafiltration) to optimize fluid status. Pt is DNR/DNI and palliative care consulted for further goals of care discussions    Pt lives with his wife who is his primary caregiver and has a visiting nurse that comes weekly to change the dressing of his PICC line; he ambulates with a walker.     PERTINENT PMH REVIEWED:  [ ] YES [ ] NO           SOCIAL HISTORY:  Significant other/partner: Wife                    Children:    5 children              Congregation/Spirituality:  Subtance hx: No              Tobacco hx: Never smoker                       Alcohol hx: No                 Home Opiod hx: No    FAMILY HISTORY:  No pertinent family history in first degree relatives      Baseline ADLs (prior to admission):  Independent: Moderately (needs helping with bathing, able to dress himself, feed himself, ambulates with walker)      ADVANCE DIRECTIVES:  [ ] YES [ ] NO   DNR [x] YES  Completed on: 5/9/17                    MOLST  [ ] YES [x] NO   Completed on:  Living Will  [ ] YES [x ] NO   Completed on:    Surrogate/HCP/Guardian: [x ] YES: Wife (Otto Plascencia)                   Phone#: [phone]    MEDICATIONS  (STANDING):  finasteride 5milliGRAM(s) Oral daily  amiodarone    Tablet 200milliGRAM(s) Oral daily  atorvastatin 20milliGRAM(s) Oral at bedtime  docusate sodium 100milliGRAM(s) Oral daily  midodrine 30milliGRAM(s) Oral every 8 hours  sevelamer hydrochloride 800milliGRAM(s) Oral three times a day with meals  levothyroxine 75MICROGram(s) Oral daily  insulin lispro (HumaLOG) corrective regimen sliding scale  SubCutaneous three times a day before meals  insulin lispro (HumaLOG) corrective regimen sliding scale  SubCutaneous at bedtime  dextrose 5%. 1000milliLiter(s) IV Continuous <Continuous>  dextrose 50% Injectable 12.5Gram(s) IV Push once  dextrose 50% Injectable 25Gram(s) IV Push once  dextrose 50% Injectable 25Gram(s) IV Push once  DOBUTamine Infusion 5.002MICROgram(s)/kG/Min IV Continuous <Continuous>    MEDICATIONS  (PRN):  dextrose Gel 1Dose(s) Oral once PRN Blood Glucose LESS THAN 70 milliGRAM(s)/deciliter  glucagon  Injectable 1milliGRAM(s) IntraMuscular once PRN Glucose LESS THAN 70 milligrams/deciliter      Allergies    No Known Allergies    Intolerances    REVIEW OF SYSTEMS    General: no fevers, chills or weight loss	    Skin/Breast: no rash    Respiratory and Thorax: + dyspnea on exertion  	  Cardiovascular: no chest pain	    Gastrointestinal:	 no nausea, vomiting, abdominal pain, or constipation	    Musculoskeletal: L shoulder pain	    Neurological: no headaches or dizziness	    Psychiatric: no anxiety/depression	    Other Symptoms:    [ ] Unable to obtain due to poor mentation     Karnofsky Performance Score/Palliative Performance Status Version 2:    30     %    Vital Signs Last 24 Hrs  T(C): 36.8, Max: 36.8 (05-16 @ 19:40)  T(F): 98.2, Max: 98.2 (05-16 @ 19:40)  HR: 89 (79 - 96)  BP: 110/68 (99/54 - 110/68)  BP(mean): --  RR: 18 (17 - 18)  SpO2: 98% (93% - 99%)    PHYSICAL EXAM:      Constitutional: resting in bed, no apparent distress    Eyes: EOMI, PERRLA    Respiratory: mildly dyspneic, bibasilar crackles    Cardiovascular: RRR, S1,S2, b/l LE 2+ pitting edema    Gastrointestinal: +BS, soft, NT/ND    Extremities: no clubbing, b/l 2+ pitting LE edema    Neurological: AAO x 3, no focal deficits    Skin: warm and ry    Psychiatric: calm and pleasant       LABS:                        10.7   5.55  )-----------( 147      ( 17 May 2017 05:53 )             35.2     05-17    137  |  98  |  28<H>  ----------------------------<  141<H>  5.1   |  26  |  5.02<H>    Ca    9.3      17 May 2017 05:53  Phos  3.1     05-17  Mg     2.2     05-17      PT/INR - ( 17 May 2017 05:53 )   PT: 17.1 SEC;   INR: 1.51              I&O's Summary    I & Os for current day (as of 17 May 2017 14:10)  =============================================  IN: 1058 ml / OUT: 3500 ml / NET: -2442 ml      RADIOLOGY & ADDITIONAL STUDIES:  - Echo and R heart cath results reviewed

## 2017-05-17 NOTE — CONSULT NOTE ADULT - ASSESSMENT
63 y/o male with a PMHx of NICM with severe LV dysfunction (EF 19%) S/P Biotronic ICD placement on Dobutamine gtt via chronic left upper extremity PICC line, ESRD on HD M/W/F, atrial fibrillation on Coumadin, COPD on 4L home O2, HLD, DM, chronic hypotension on Midodrine, p/w syncopal episode admitted for acute on chronic CHF exacerbation

## 2017-05-17 NOTE — CONSULT NOTE ADULT - ATTENDING COMMENTS
Patient seen and examined. Agree with resident note. Patient seen and examined. Agree with resident note.    [facility] minutes spent discussing advanced directives. Patient is DNR. Ongoing goals of care conversations

## 2017-05-17 NOTE — PROGRESS NOTE ADULT - PROBLEM SELECTOR PLAN 2
Dobutamine dose increased, as per CHF service.   Increase UF goal as tolerated. Cont midodrine preHD.

## 2017-05-17 NOTE — PROGRESS NOTE ADULT - SUBJECTIVE AND OBJECTIVE BOX
CHIEF COMPLAINT:Patient is a 62y old  Male who presents with a chief complaint of sob (09 May 2017 15:32)    	    No Known Allergies      PAST MEDICAL & SURGICAL HISTORY:  Chronic hypotension  AF (atrial fibrillation)  COPD (chronic obstructive pulmonary disease): 4L home O2  HLD (hyperlipidemia)  DM (diabetes mellitus)  ESRD (end stage renal disease) on dialysis  BPH (benign prostatic hypertrophy)  Myocardial infarction: 10/2011  Chronic renal insufficiency  Gout  Dyslipidemia  Diabetes mellitus  CHF (congestive heart failure)  AICD (automatic cardioverter/defibrillator) present: Biot[employer] - placed 9/11/09  H/O coronary angiogram      FAMILY HISTORY:  No pertinent family history in first degree relatives      REVIEW OF SYSTEMS:  CONSTITUTIONAL: No fever, weight loss, or fatigue  EYES: No eye pain, visual disturbances, or discharge  NECK: No pain or stiffness  RESPIRATORY: No cough, wheezing, chills or hemoptysis; No Shortness of Breath  CARDIOVASCULAR: No chest pain, palpitations, passing out, dizziness, or leg swelling  GASTROINTESTINAL: No abdominal or epigastric pain. No nausea, vomiting, or hematemesis; No diarrhea or constipation. No melena or hematochezia.  GENITOURINARY: No dysuria, frequency, hematuria, or incontinence  NEUROLOGICAL: No headaches, memory loss, loss of strength, numbness, or tremors  SKIN: No itching, burning, rashes, or lesions   LYMPH Nodes: No enlarged glands  ENDOCRINE: No heat or cold intolerance; No hair loss  MUSCULOSKELETAL: No joint pain or swelling; No muscle, back, or extremity pain    Medications:  MEDICATIONS  (STANDING):  finasteride 5milliGRAM(s) Oral daily  amiodarone    Tablet 200milliGRAM(s) Oral daily  atorvastatin 20milliGRAM(s) Oral at bedtime  docusate sodium 100milliGRAM(s) Oral daily  midodrine 30milliGRAM(s) Oral every 8 hours  sevelamer hydrochloride 800milliGRAM(s) Oral three times a day with meals  levothyroxine 75MICROGram(s) Oral daily  insulin lispro (HumaLOG) corrective regimen sliding scale  SubCutaneous three times a day before meals  insulin lispro (HumaLOG) corrective regimen sliding scale  SubCutaneous at bedtime  dextrose 5%. 1000milliLiter(s) IV Continuous <Continuous>  dextrose 50% Injectable 12.5Gram(s) IV Push once  dextrose 50% Injectable 25Gram(s) IV Push once  dextrose 50% Injectable 25Gram(s) IV Push once  DOBUTamine Infusion 5.002MICROgram(s)/kG/Min IV Continuous <Continuous>  warfarin 3milliGRAM(s) Oral once    MEDICATIONS  (PRN):  dextrose Gel 1Dose(s) Oral once PRN Blood Glucose LESS THAN 70 milliGRAM(s)/deciliter  glucagon  Injectable 1milliGRAM(s) IntraMuscular once PRN Glucose LESS THAN 70 milligrams/deciliter    	    PHYSICAL EXAM:  T(C): 36.8, Max: 36.8 (05-16 @ 19:40)  HR: 82 (79 - 96)  BP: 103/67 (99/54 - 110/68)  RR: 17 (17 - 18)  SpO2: 100% (93% - 100%)  Wt(kg): --  I&O's Summary    I & Os for current day (as of 17 May 2017 16:55)  =============================================  IN: 1058 ml / OUT: 3500 ml / NET: -2442 ml      Appearance: Normal	  HEENT:   Normal oral mucosa, PERRL, EOMI	  Lymphatic: No lymphadenopathy  Cardiovascular: Normal S1 S2, No JVD, No murmurs, No edema  Respiratory: Lungs clear to auscultation	  Psychiatry: A & O x 3, Mood & affect appropriate  Gastrointestinal:  Soft, Non-tender, + BS	  Skin: No rashes, No ecchymoses, No cyanosis	  Neurologic: Non-focal  Extremities: Normal range of motion, No clubbing, cyanosis or edema  Vascular: Peripheral pulses palpable 2+ bilaterally                          10.7   5.55  )-----------( 147      ( 17 May 2017 05:53 )             35.2     05-17    137  |  98  |  28<H>  ----------------------------<  141<H>  5.1   |  26  |  5.02<H>    Ca    9.3      17 May 2017 05:53  Phos  3.1     05-17  Mg     2.2     05-17 CHIEF COMPLAINT:Patient is a 62y old  Male who presents with a chief complaint of sob (09 May 2017 15:32)    	    No Known Allergies      PAST MEDICAL & SURGICAL HISTORY:  Chronic hypotension  AF (atrial fibrillation)  COPD (chronic obstructive pulmonary disease): 4L home O2  HLD (hyperlipidemia)  DM (diabetes mellitus)  ESRD (end stage renal disease) on dialysis  BPH (benign prostatic hypertrophy)  Myocardial infarction: 10/2011  Chronic renal insufficiency  Gout  Dyslipidemia  Diabetes mellitus  CHF (congestive heart failure)  AICD (automatic cardioverter/defibrillator) present: Biot[employer] - placed 9/11/09  H/O coronary angiogram      FAMILY HISTORY:  No pertinent family history in first degree relatives      REVIEW OF SYSTEMS:  CONSTITUTIONAL: No fever, weight loss, or fatigue  EYES: No eye pain, visual disturbances, or discharge  NECK: No pain or stiffness  RESPIRATORY: No cough, wheezing, chills or hemoptysis; SOB at usual level  CARDIOVASCULAR: No chest pain, palpitations, passing out, dizziness, or leg swelling  GASTROINTESTINAL: No abdominal or epigastric pain. No nausea, vomiting, or hematemesis; No diarrhea or constipation. No melena or hematochezia.  GENITOURINARY: No dysuria, frequency, hematuria, or incontinence  NEUROLOGICAL: No headaches, memory loss, loss of strength, numbness, or tremors  SKIN: No itching, burning, rashes, or lesions   LYMPH Nodes: No enlarged glands  ENDOCRINE: No heat or cold intolerance; No hair loss  MUSCULOSKELETAL: No joint pain or swelling; No muscle, back, or extremity pain    Medications:  MEDICATIONS  (STANDING):  finasteride 5milliGRAM(s) Oral daily  amiodarone    Tablet 200milliGRAM(s) Oral daily  atorvastatin 20milliGRAM(s) Oral at bedtime  docusate sodium 100milliGRAM(s) Oral daily  midodrine 30milliGRAM(s) Oral every 8 hours  sevelamer hydrochloride 800milliGRAM(s) Oral three times a day with meals  levothyroxine 75MICROGram(s) Oral daily  insulin lispro (HumaLOG) corrective regimen sliding scale  SubCutaneous three times a day before meals  insulin lispro (HumaLOG) corrective regimen sliding scale  SubCutaneous at bedtime  dextrose 5%. 1000milliLiter(s) IV Continuous <Continuous>  dextrose 50% Injectable 12.5Gram(s) IV Push once  dextrose 50% Injectable 25Gram(s) IV Push once  dextrose 50% Injectable 25Gram(s) IV Push once  DOBUTamine Infusion 5.002MICROgram(s)/kG/Min IV Continuous <Continuous>  warfarin 3milliGRAM(s) Oral once    MEDICATIONS  (PRN):  dextrose Gel 1Dose(s) Oral once PRN Blood Glucose LESS THAN 70 milliGRAM(s)/deciliter  glucagon  Injectable 1milliGRAM(s) IntraMuscular once PRN Glucose LESS THAN 70 milligrams/deciliter    	    PHYSICAL EXAM:  T(C): 36.8, Max: 36.8 (05-16 @ 19:40)  HR: 82 (79 - 96)  BP: 103/67 (99/54 - 110/68)  RR: 17 (17 - 18)  SpO2: 100% (93% - 100%)  Wt(kg): --  I&O's Summary    I & Os for current day (as of 17 May 2017 16:55)  =============================================  IN: 1058 ml / OUT: 3500 ml / NET: -2442 ml      Appearance: Normal	  HEENT:   Normal oral mucosa, PERRL, EOMI	  Lymphatic: No lymphadenopathy  Cardiovascular: Normal S1 S2, No JVD, No murmurs, No edema, irregular  Respiratory: Lungs clear to auscultation	  Psychiatry: A & O x 3, Mood & affect appropriate  Gastrointestinal:  Soft, Non-tender, + BS	  Skin: No rashes, No ecchymoses, No cyanosis	  Neurologic: Non-focal  Extremities: Normal range of motion, No clubbing, cyanosis or edema  Vascular: Peripheral pulses palpable 2+ bilaterally                          10.7   5.55  )-----------( 147      ( 17 May 2017 05:53 )             35.2     05-17    137  |  98  |  28<H>  ----------------------------<  141<H>  5.1   |  26  |  5.02<H>    Ca    9.3      17 May 2017 05:53  Phos  3.1     05-17  Mg     2.2     05-17

## 2017-05-17 NOTE — PROGRESS NOTE ADULT - ASSESSMENT
63 yo M with acute on chronic severe systolic CHF (EF 19%), ESRD, DM2, A fib:  - Acute on chronic systolic CHF - s/p right heart cath 63 yo M with acute on chronic severe systolic CHF (EF 19%), ESRD, DM2, A fib:  - Acute on chronic systolic CHF - s/p right heart cath, dependent on Dobutamine drip, requiring more frequent HD for fluid removal, prognosis very poor, palliative team to address goals of care with family, other plan as per Cardio  - ESRD - continue HD as per Renal, continue Midodrin  - A fib - restart Coumadin, continue with Amio  - DM2 - controlled, continue with ISS  - Poor prognosis

## 2017-05-18 LAB
BUN SERPL-MCNC: 35 MG/DL — HIGH (ref 7–23)
CALCIUM SERPL-MCNC: 9.4 MG/DL — SIGNIFICANT CHANGE UP (ref 8.4–10.5)
CHLORIDE SERPL-SCNC: 93 MMOL/L — LOW (ref 98–107)
CO2 SERPL-SCNC: 26 MMOL/L — SIGNIFICANT CHANGE UP (ref 22–31)
CREAT SERPL-MCNC: 6.09 MG/DL — HIGH (ref 0.5–1.3)
GLUCOSE SERPL-MCNC: 117 MG/DL — HIGH (ref 70–99)
HCT VFR BLD CALC: 34 % — LOW (ref 39–50)
HGB BLD-MCNC: 10.3 G/DL — LOW (ref 13–17)
INR BLD: 1.59 — HIGH (ref 0.88–1.17)
MAGNESIUM SERPL-MCNC: 2.2 MG/DL — SIGNIFICANT CHANGE UP (ref 1.6–2.6)
MCHC RBC-ENTMCNC: 28.1 PG — SIGNIFICANT CHANGE UP (ref 27–34)
MCHC RBC-ENTMCNC: 30.3 % — LOW (ref 32–36)
MCV RBC AUTO: 92.6 FL — SIGNIFICANT CHANGE UP (ref 80–100)
PHOSPHATE SERPL-MCNC: 3.8 MG/DL — SIGNIFICANT CHANGE UP (ref 2.5–4.5)
PLATELET # BLD AUTO: 144 K/UL — LOW (ref 150–400)
PMV BLD: 11.7 FL — SIGNIFICANT CHANGE UP (ref 7–13)
POTASSIUM SERPL-MCNC: 4.8 MMOL/L — SIGNIFICANT CHANGE UP (ref 3.5–5.3)
POTASSIUM SERPL-SCNC: 4.8 MMOL/L — SIGNIFICANT CHANGE UP (ref 3.5–5.3)
PROTHROM AB SERPL-ACNC: 18 SEC — HIGH (ref 9.8–13.1)
RBC # BLD: 3.67 M/UL — LOW (ref 4.2–5.8)
RBC # FLD: 17.4 % — HIGH (ref 10.3–14.5)
SODIUM SERPL-SCNC: 132 MMOL/L — LOW (ref 135–145)
WBC # BLD: 5.23 K/UL — SIGNIFICANT CHANGE UP (ref 3.8–10.5)
WBC # FLD AUTO: 5.23 K/UL — SIGNIFICANT CHANGE UP (ref 3.8–10.5)

## 2017-05-18 PROCEDURE — 99232 SBSQ HOSP IP/OBS MODERATE 35: CPT

## 2017-05-18 RX ORDER — WARFARIN SODIUM 2.5 MG/1
3 TABLET ORAL ONCE
Qty: 0 | Refills: 0 | Status: COMPLETED | OUTPATIENT
Start: 2017-05-18 | End: 2017-05-18

## 2017-05-18 RX ADMIN — MIDODRINE HYDROCHLORIDE 30 MILLIGRAM(S): 2.5 TABLET ORAL at 13:15

## 2017-05-18 RX ADMIN — Medication 75 MICROGRAM(S): at 06:06

## 2017-05-18 RX ADMIN — AMIODARONE HYDROCHLORIDE 200 MILLIGRAM(S): 400 TABLET ORAL at 06:05

## 2017-05-18 RX ADMIN — FINASTERIDE 5 MILLIGRAM(S): 5 TABLET, FILM COATED ORAL at 11:33

## 2017-05-18 RX ADMIN — Medication 100 MILLIGRAM(S): at 11:33

## 2017-05-18 RX ADMIN — SEVELAMER CARBONATE 800 MILLIGRAM(S): 2400 POWDER, FOR SUSPENSION ORAL at 11:33

## 2017-05-18 RX ADMIN — MIDODRINE HYDROCHLORIDE 30 MILLIGRAM(S): 2.5 TABLET ORAL at 21:31

## 2017-05-18 RX ADMIN — WARFARIN SODIUM 3 MILLIGRAM(S): 2.5 TABLET ORAL at 21:42

## 2017-05-18 RX ADMIN — SEVELAMER CARBONATE 800 MILLIGRAM(S): 2400 POWDER, FOR SUSPENSION ORAL at 08:16

## 2017-05-18 RX ADMIN — ATORVASTATIN CALCIUM 20 MILLIGRAM(S): 80 TABLET, FILM COATED ORAL at 21:31

## 2017-05-18 RX ADMIN — Medication 14.9 MICROGRAM(S)/KG/MIN: at 21:32

## 2017-05-18 RX ADMIN — Medication 14.9 MICROGRAM(S)/KG/MIN: at 08:00

## 2017-05-18 RX ADMIN — MIDODRINE HYDROCHLORIDE 30 MILLIGRAM(S): 2.5 TABLET ORAL at 06:05

## 2017-05-18 NOTE — PROGRESS NOTE ADULT - SUBJECTIVE AND OBJECTIVE BOX
CHIEF COMPLAINT:Patient is a 62y old  Male who presents with a chief complaint of sob (09 May 2017 15:32)      No Known Allergies      PAST MEDICAL & SURGICAL HISTORY:  Chronic hypotension  AF (atrial fibrillation)  HLD (hyperlipidemia)  DM (diabetes mellitus)  ESRD (end stage renal disease) on dialysis  BPH (benign prostatic hypertrophy)  Myocardial infarction: 10/2011  Chronic renal insufficiency  Gout  Dyslipidemia  Diabetes mellitus  CHF (congestive heart failure)  AICD (automatic cardioverter/defibrillator) present: [employer]ot[employer] - placed 9/11/09  H/O coronary angiogram      FAMILY HISTORY:  No pertinent family history in first degree relatives      REVIEW OF SYSTEMS:  CONSTITUTIONAL: No fever, weight loss, or fatigue  EYES: No eye pain, visual disturbances, or discharge  NECK: No pain or stiffness  RESPIRATORY: No cough, wheezing, chills or hemoptysis; SOB at usual level  CARDIOVASCULAR: No chest pain, palpitations, passing out, dizziness, or leg swelling  GASTROINTESTINAL: No abdominal or epigastric pain. No nausea, vomiting, or hematemesis; No diarrhea or constipation. No melena or hematochezia.  GENITOURINARY: No dysuria, frequency, hematuria, or incontinence  NEUROLOGICAL: No headaches, memory loss, loss of strength, numbness, or tremors  SKIN: No itching, burning, rashes, or lesions   LYMPH Nodes: No enlarged glands  ENDOCRINE: No heat or cold intolerance; No hair loss  MUSCULOSKELETAL: No joint pain or swelling; No muscle, back, or extremity pain    Medications:  MEDICATIONS  (STANDING):  finasteride 5milliGRAM(s) Oral daily  amiodarone    Tablet 200milliGRAM(s) Oral daily  atorvastatin 20milliGRAM(s) Oral at bedtime  docusate sodium 100milliGRAM(s) Oral daily  midodrine 30milliGRAM(s) Oral every 8 hours  sevelamer hydrochloride 800milliGRAM(s) Oral three times a day with meals  levothyroxine 75MICROGram(s) Oral daily  insulin lispro (HumaLOG) corrective regimen sliding scale  SubCutaneous three times a day before meals  insulin lispro (HumaLOG) corrective regimen sliding scale  SubCutaneous at bedtime  dextrose 5%. 1000milliLiter(s) IV Continuous <Continuous>  dextrose 50% Injectable 12.5Gram(s) IV Push once  dextrose 50% Injectable 25Gram(s) IV Push once  dextrose 50% Injectable 25Gram(s) IV Push once  DOBUTamine Infusion 5.002MICROgram(s)/kG/Min IV Continuous <Continuous>  warfarin 3milliGRAM(s) Oral once    MEDICATIONS  (PRN):  dextrose Gel 1Dose(s) Oral once PRN Blood Glucose LESS THAN 70 milliGRAM(s)/deciliter  glucagon  Injectable 1milliGRAM(s) IntraMuscular once PRN Glucose LESS THAN 70 milligrams/deciliter    	    PHYSICAL EXAM:  T(C): 36.6, Max: 36.6 (05-17 @ 23:30)  HR: 79 (73 - 91)  BP: 113/76 (97/68 - 121/77)  RR: 18 (17 - 18)  SpO2: 100% (98% - 100%)  Wt(kg): --  I&O's Summary  I & Os for 24h ending 18 May 2017 07:00  =============================================  IN: 400 ml / OUT: 2900 ml / NET: -2500 ml    I & Os for current day (as of 18 May 2017 20:14)  =============================================  IN: 500 ml / OUT: 3600 ml / NET: -3100 ml      Appearance: Normal	  HEENT:   Normal oral mucosa, PERRL, EOMI	  Lymphatic: No lymphadenopathy  Cardiovascular: Normal S1 S2, No JVD, No murmurs, No edema  Respiratory: Lungs clear to auscultation	  Psychiatry: A & O x 3, Mood & affect appropriate  Gastrointestinal:  Soft, Non-tender, + BS	  Skin: No rashes, No ecchymoses, No cyanosis	  Neurologic: Non-focal  Extremities: Normal range of motion, No clubbing, cyanosis or edema  Vascular: Peripheral pulses palpable 2+ bilaterally                          10.3   5.23  )-----------( 144      ( 18 May 2017 05:50 )             34.0     05-18    132<L>  |  93<L>  |  35<H>  ----------------------------<  117<H>  4.8   |  26  |  6.09<H>    Ca    9.4      18 May 2017 05:50  Phos  3.8     05-18  Mg     2.2     05-18

## 2017-05-18 NOTE — PROGRESS NOTE ADULT - ASSESSMENT
63 yo M with acute on chronic severe systolic CHF (EF 19%), ESRD, DM2, A fib:  - Acute on chronic systolic CHF - s/p right heart cath, dependent on Dobutamine drip, requiring more frequent HD for fluid removal, prognosis very poor, follow with CHF team  - ESRD - continue HD as per Renal, continue Midodrin, outpatient 4 times a week HD options being considered by Renal  - A fib - restart Coumadin, continue with Amio  - DM2 - controlled, continue with ISS  - Poor prognosis, family meeting by Palliative care team tomorrow

## 2017-05-18 NOTE — PROGRESS NOTE ADULT - PROBLEM SELECTOR PLAN 2
Pt was on HD three times a week prior to this admission, currently requiring more frequent HD/UF  - will have to determine plan from Renal and Heart failure team if increased frequency of HD/UF is temporary or pt will need it for long term.

## 2017-05-18 NOTE — PROGRESS NOTE ADULT - PROBLEM SELECTOR PLAN 6
Spoke with wife (Otto Plascencia) today over the phone. She stated that she is the primary caregiver for pt at home and has been having difficulty managing patient because she also works. She understands pt's poor prognosis and wants pt to be comfortable. She also stated that pt's sisters are having a difficult time accepting pt's poor prognosis which has been complicating pt's overall care. Family meeting set up for Friday at 11 am with wife, also encouraged to bring pt's sisters to the meeting as well.

## 2017-05-18 NOTE — PROGRESS NOTE ADULT - ASSESSMENT
61 y/o male with a PMHx of NICM with severe LV dysfunction (EF 19%) S/P Biotronic ICD placement on Dobutamine gtt via chronic left upper extremity PICC line, ESRD on HD M/W/F, atrial fibrillation on Coumadin, COPD on 4L home O2, HLD, DM, chronic hypotension on Midodrine, p/w syncopal episode admitted for acute on chronic CHF exacerbation

## 2017-05-18 NOTE — PROGRESS NOTE ADULT - PROBLEM SELECTOR PLAN 2
Dobutamine dose increased, as per CHF service.   Increase UF goal as tolerated.   Improved hemodynamics during HD. Cont midodrine preHD.

## 2017-05-18 NOTE — PROGRESS NOTE ADULT - ATTENDING COMMENTS
Patient seen and examined.   Agree with resident note.    Patient with numerous chronic comorbidities. Plan for family meeting this upcoming friday.

## 2017-05-18 NOTE — PROGRESS NOTE ADULT - SUBJECTIVE AND OBJECTIVE BOX
[facility] Nephrology - PROGRESS NOTE    Patient is a 62y Male with severe heart failure, ESRD on HD a/w syncope and volume overload.     Pt seen and examined.  No acute events overnight. Improving dyspnea and swelling.    Hospital Medications:   MEDICATIONS  (STANDING):  finasteride 5milliGRAM(s) Oral daily  amiodarone    Tablet 200milliGRAM(s) Oral daily  atorvastatin 20milliGRAM(s) Oral at bedtime  docusate sodium 100milliGRAM(s) Oral daily  midodrine 30milliGRAM(s) Oral every 8 hours  sevelamer hydrochloride 800milliGRAM(s) Oral three times a day with meals  levothyroxine 75MICROGram(s) Oral daily  insulin lispro (HumaLOG) corrective regimen sliding scale  SubCutaneous three times a day before meals  insulin lispro (HumaLOG) corrective regimen sliding scale  SubCutaneous at bedtime  dextrose 5%. 1000milliLiter(s) IV Continuous <Continuous>  dextrose 50% Injectable 12.5Gram(s) IV Push once  dextrose 50% Injectable 25Gram(s) IV Push once  dextrose 50% Injectable 25Gram(s) IV Push once  DOBUTamine Infusion 5.002MICROgram(s)/kG/Min IV Continuous <Continuous>  warfarin 3milliGRAM(s) Oral once    REVIEW OF SYSTEMS:  CONSTITUTIONAL: No weakness, fevers or chills  EYES/ENT: No visual changes;  No vertigo or throat pain   NECK: No pain or stiffness  RESPIRATORY: No cough, wheezing, hemoptysis; No shortness of breath  CARDIOVASCULAR: No chest pain or palpitations.  GASTROINTESTINAL: No abdominal or epigastric pain. No nausea, vomiting, or hematemesis; No diarrhea or constipation. No melena or hematochezia.  GENITOURINARY: No dysuria, frequency, foamy urine, urinary urgency, incontinence or hematuria  NEUROLOGICAL: No numbness or weakness  SKIN: No itching, burning, rashes, or lesions   VASCULAR: + bilateral lower extremity edema.   All other review of systems is negative unless indicated above.    VITALS:  T(F): 97.6, Max: 98.3 (05-17 @ 16:30)  HR: 77  BP: 121/77  RR: 17  SpO2: 100%  Wt(kg): --    I & Os for current day (as of 05-18 @ 15:05)  =============================================  IN: 400 ml / OUT: 2900 ml / NET: -2500 ml      PHYSICAL EXAM:  Constitutional: NAD  HEENT: anicteric sclera, oropharynx clear, MMM  Neck: No JVD  Respiratory: Bibasilar crackles  Cardiovascular: S1, S2, RRR  Gastrointestinal: BS+, soft, NT. Distended.   Extremities: No cyanosis or clubbing. Minimal LE pitting edema  Neurological: A/O x 3, no focal deficits  Psychiatric: Normal mood, normal affect  : No CVA tenderness. No rosa.   Skin: No rashes  Vascular Access: RIJ Tunneled catheter, LUE PICC      LABS:  05-18    132<L>  |  93<L>  |  35<H>  ----------------------------<  117<H>  4.8   |  26  |  6.09<H>    Ca    9.4      18 May 2017 05:50  Phos  3.8     05-18  Mg     2.2     05-18      Creatinine Trend: 6.09 <--, 5.02 <--, 6.64 <--, 5.26 <--, 4.56 <--, 5.03 <--, 3.87 <--                        10.3   5.23  )-----------( 144      ( 18 May 2017 05:50 )             34.0

## 2017-05-18 NOTE — PROGRESS NOTE ADULT - PROBLEM SELECTOR PLAN 1
Pt tolerated PUF yesterday. 2kg UF achieved.  Plan for HD today, with UF goal 2.5-3kg.  K controlled.  Again isolated UF tomorrow for fluid optimization.  Unsure if logistically possible to dialyze 4 times weekly in outpt setting. Will confirm with HD unit.  Agree with pall care consult to discuss GOC, in setting of severe CM and ESRD with frequent volume overload and hypotension.

## 2017-05-18 NOTE — PROGRESS NOTE ADULT - SUBJECTIVE AND OBJECTIVE BOX
INTERVAL HPI/OVERNIGHT EVENTS: No acute events overnights. Pt with no complaints currently. Reports no shortness of breath while at rest.     ADVANCE DIRECTIVES:  [ ] YES [ ] NO   DNR [x] YES  Completed on: 5/9/17                    MOLST  [ ] YES [x] NO   Completed on:  Living Will  [ ] YES [x ] NO   Completed on:      PRESENT SYMPTOMS: SOURCE:  Patient/Family/Team    PAIN SCALE:  0 = none  1 = mild   2 = moderate  3 = severe    Pain: 0    Dyspnea: not at rest  Anxiety: NO  Fatigue: NO  Nausea: NO  Loss of Appetite: NO      MEDICATIONS  (STANDING):  finasteride 5milliGRAM(s) Oral daily  amiodarone    Tablet 200milliGRAM(s) Oral daily  atorvastatin 20milliGRAM(s) Oral at bedtime  docusate sodium 100milliGRAM(s) Oral daily  midodrine 30milliGRAM(s) Oral every 8 hours  sevelamer hydrochloride 800milliGRAM(s) Oral three times a day with meals  levothyroxine 75MICROGram(s) Oral daily  insulin lispro (HumaLOG) corrective regimen sliding scale  SubCutaneous three times a day before meals  insulin lispro (HumaLOG) corrective regimen sliding scale  SubCutaneous at bedtime  dextrose 5%. 1000milliLiter(s) IV Continuous <Continuous>  dextrose 50% Injectable 12.5Gram(s) IV Push once  dextrose 50% Injectable 25Gram(s) IV Push once  dextrose 50% Injectable 25Gram(s) IV Push once  DOBUTamine Infusion 5.002MICROgram(s)/kG/Min IV Continuous <Continuous>  warfarin 3milliGRAM(s) Oral once    MEDICATIONS  (PRN):  dextrose Gel 1Dose(s) Oral once PRN Blood Glucose LESS THAN 70 milliGRAM(s)/deciliter  glucagon  Injectable 1milliGRAM(s) IntraMuscular once PRN Glucose LESS THAN 70 milligrams/deciliter      Allergies    No Known Allergies    Intolerances      REVIEW OF SYSTEMS    General: no fevers, chills or weight loss	    Skin/Breast: no rash    Respiratory and Thorax: + dyspnea on exertion  	  Cardiovascular: no chest pain	    Gastrointestinal:	 no nausea, vomiting, abdominal pain, or constipation	    Musculoskeletal: L shoulder pain	    Neurological: no headaches or dizziness	    Psychiatric: no anxiety/depression  	    Allergic/Immunologic:	    OTHER SYMPTOMS:  [ ] YES [ ] NO  Unable to obtain due to poor mentation    Karnofsky Performance Score/Palliative Performance Status Version 2:   30%    PHYSICAL EXAM:    Constitutional: resting in bed, no apparent distress    Eyes: EOMI, PERRLA    Respiratory: mildly dyspneic, bibasilar crackles    Cardiovascular: RRR, S1,S2, b/l LE 2+ pitting edema    Gastrointestinal: +BS, soft, NT/ND    Extremities: no clubbing, b/l 2+ pitting LE edema    Neurological: AAO x 3, no focal deficits    Skin: warm and dry    Psychiatric: calm and pleasant       Vital Signs Last 24 Hrs  T(C): 36.2, Max: 36.8 (05-17 @ 11:36)  T(F): 97.1, Max: 98.3 (05-17 @ 16:30)  HR: 91 (73 - 91)  BP: 116/75 (100/62 - 118/66)  BP(mean): --  RR: 18 (17 - 18)  SpO2: 98% (98% - 100%)    LABS:                        10.3   5.23  )-----------( 144      ( 18 May 2017 05:50 )             34.0     05-18    132<L>  |  93<L>  |  35<H>  ----------------------------<  117<H>  4.8   |  26  |  6.09<H>    Ca    9.4      18 May 2017 05:50  Phos  3.8     05-18  Mg     2.2     05-18      PT/INR - ( 18 May 2017 05:50 )   PT: 18.0 SEC;   INR: 1.59         I&O's Summary    I & Os for current day (as of 18 May 2017 11:20)  =============================================  IN: 400 ml / OUT: 2900 ml / NET: -2500 ml INTERVAL HPI/OVERNIGHT EVENTS: No acute events overnights. Pt with no complaints currently. Reports no shortness of breath while at rest.     ADVANCE DIRECTIVES:  [ ] YES [ ] NO   DNR [x] YES  Completed on: 5/9/17                    MOLST  [ ] YES [x] NO   Completed on:  Living Will  [ ] YES [x ] NO   Completed on:      PRESENT SYMPTOMS: SOURCE:  Patient/Family/Team    PAIN SCALE:  0 = none  1 = mild   2 = moderate  3 = severe    Pain: 0    Dyspnea: not at rest  Anxiety: NO  Fatigue: NO  Nausea: NO  Loss of Appetite: NO      MEDICATIONS  (STANDING):  finasteride 5milliGRAM(s) Oral daily  amiodarone    Tablet 200milliGRAM(s) Oral daily  atorvastatin 20milliGRAM(s) Oral at bedtime  docusate sodium 100milliGRAM(s) Oral daily  midodrine 30milliGRAM(s) Oral every 8 hours  sevelamer hydrochloride 800milliGRAM(s) Oral three times a day with meals  levothyroxine 75MICROGram(s) Oral daily  insulin lispro (HumaLOG) corrective regimen sliding scale  SubCutaneous three times a day before meals  insulin lispro (HumaLOG) corrective regimen sliding scale  SubCutaneous at bedtime  dextrose 5%. 1000milliLiter(s) IV Continuous <Continuous>  dextrose 50% Injectable 12.5Gram(s) IV Push once  dextrose 50% Injectable 25Gram(s) IV Push once  dextrose 50% Injectable 25Gram(s) IV Push once  DOBUTamine Infusion 5.002MICROgram(s)/kG/Min IV Continuous <Continuous>  warfarin 3milliGRAM(s) Oral once    MEDICATIONS  (PRN):  dextrose Gel 1Dose(s) Oral once PRN Blood Glucose LESS THAN 70 milliGRAM(s)/deciliter  glucagon  Injectable 1milliGRAM(s) IntraMuscular once PRN Glucose LESS THAN 70 milligrams/deciliter      Allergies    No Known Allergies    Intolerances      REVIEW OF SYSTEMS    General: no fevers, chills or weight loss	    Skin/Breast: no rash    Respiratory and Thorax: + dyspnea on exertion  	  Cardiovascular: no chest pain	    Gastrointestinal:	 no nausea, vomiting, abdominal pain, or constipation	    Musculoskeletal: L shoulder pain	    Neurological: no headaches or dizziness	    Psychiatric: no anxiety/depression  	    Allergic/Immunologic:	    OTHER SYMPTOMS:  [ ] YES [x ] NO  Unable to obtain due to poor mentation    Karnofsky Performance Score/Palliative Performance Status Version 2:   30%    PHYSICAL EXAM:    Constitutional: resting in bed, no apparent distress    Eyes: EOMI, PERRLA    Respiratory: mildly dyspneic, bibasilar crackles    Cardiovascular: RRR, S1,S2, b/l LE 2+ pitting edema    Gastrointestinal: +BS, soft, NT/ND    Extremities: no clubbing, b/l 2+ pitting LE edema    Neurological: AAO x 3, no focal deficits    Skin: warm and dry    Psychiatric: calm and pleasant       Vital Signs Last 24 Hrs  T(C): 36.2, Max: 36.8 (05-17 @ 11:36)  T(F): 97.1, Max: 98.3 (05-17 @ 16:30)  HR: 91 (73 - 91)  BP: 116/75 (100/62 - 118/66)  BP(mean): --  RR: 18 (17 - 18)  SpO2: 98% (98% - 100%)    LABS:                        10.3   5.23  )-----------( 144      ( 18 May 2017 05:50 )             34.0     05-18    132<L>  |  93<L>  |  35<H>  ----------------------------<  117<H>  4.8   |  26  |  6.09<H>    Ca    9.4      18 May 2017 05:50  Phos  3.8     05-18  Mg     2.2     05-18      PT/INR - ( 18 May 2017 05:50 )   PT: 18.0 SEC;   INR: 1.59         I&O's Summary    I & Os for current day (as of 18 May 2017 11:20)  =============================================  IN: 400 ml / OUT: 2900 ml / NET: -2500 ml

## 2017-05-18 NOTE — PROGRESS NOTE ADULT - PROBLEM SELECTOR PLAN 1
Pt with EF of 19%, on dobutamine at home and has needed increased dose of dobutamine during this hospital stay. Has also been requiring more frequent dialysis for volume overload   - Heart failure team following, c/w dobutamine  - pt also with chronic hypotension and on midodrine 30 mg po q8h.

## 2017-05-19 DIAGNOSIS — Z71.89 OTHER SPECIFIED COUNSELING: ICD-10-CM

## 2017-05-19 LAB
BUN SERPL-MCNC: 27 MG/DL — HIGH (ref 7–23)
CALCIUM SERPL-MCNC: 9.1 MG/DL — SIGNIFICANT CHANGE UP (ref 8.4–10.5)
CHLORIDE SERPL-SCNC: 96 MMOL/L — LOW (ref 98–107)
CO2 SERPL-SCNC: 25 MMOL/L — SIGNIFICANT CHANGE UP (ref 22–31)
CREAT SERPL-MCNC: 5.17 MG/DL — HIGH (ref 0.5–1.3)
GLUCOSE SERPL-MCNC: 97 MG/DL — SIGNIFICANT CHANGE UP (ref 70–99)
HCT VFR BLD CALC: 35.2 % — LOW (ref 39–50)
HGB BLD-MCNC: 10.6 G/DL — LOW (ref 13–17)
INR BLD: 1.8 — HIGH (ref 0.88–1.17)
MAGNESIUM SERPL-MCNC: 2.1 MG/DL — SIGNIFICANT CHANGE UP (ref 1.6–2.6)
MCHC RBC-ENTMCNC: 28 PG — SIGNIFICANT CHANGE UP (ref 27–34)
MCHC RBC-ENTMCNC: 30.1 % — LOW (ref 32–36)
MCV RBC AUTO: 92.9 FL — SIGNIFICANT CHANGE UP (ref 80–100)
PHOSPHATE SERPL-MCNC: 3.7 MG/DL — SIGNIFICANT CHANGE UP (ref 2.5–4.5)
PLATELET # BLD AUTO: 149 K/UL — LOW (ref 150–400)
PMV BLD: 11.8 FL — SIGNIFICANT CHANGE UP (ref 7–13)
POTASSIUM SERPL-MCNC: 4.3 MMOL/L — SIGNIFICANT CHANGE UP (ref 3.5–5.3)
POTASSIUM SERPL-SCNC: 4.3 MMOL/L — SIGNIFICANT CHANGE UP (ref 3.5–5.3)
PROTHROM AB SERPL-ACNC: 20.4 SEC — HIGH (ref 9.8–13.1)
RBC # BLD: 3.79 M/UL — LOW (ref 4.2–5.8)
RBC # FLD: 17.3 % — HIGH (ref 10.3–14.5)
SODIUM SERPL-SCNC: 134 MMOL/L — LOW (ref 135–145)
WBC # BLD: 5.13 K/UL — SIGNIFICANT CHANGE UP (ref 3.8–10.5)
WBC # FLD AUTO: 5.13 K/UL — SIGNIFICANT CHANGE UP (ref 3.8–10.5)

## 2017-05-19 PROCEDURE — 99497 ADVNCD CARE PLAN 30 MIN: CPT | Mod: 25

## 2017-05-19 PROCEDURE — 99233 SBSQ HOSP IP/OBS HIGH 50: CPT

## 2017-05-19 RX ORDER — WARFARIN SODIUM 2.5 MG/1
3 TABLET ORAL ONCE
Qty: 0 | Refills: 0 | Status: COMPLETED | OUTPATIENT
Start: 2017-05-19 | End: 2017-05-19

## 2017-05-19 RX ORDER — MIDODRINE HYDROCHLORIDE 2.5 MG/1
30 TABLET ORAL ONCE
Qty: 0 | Refills: 0 | Status: COMPLETED | OUTPATIENT
Start: 2017-05-19 | End: 2017-05-19

## 2017-05-19 RX ADMIN — WARFARIN SODIUM 3 MILLIGRAM(S): 2.5 TABLET ORAL at 17:27

## 2017-05-19 RX ADMIN — FINASTERIDE 5 MILLIGRAM(S): 5 TABLET, FILM COATED ORAL at 13:25

## 2017-05-19 RX ADMIN — SEVELAMER CARBONATE 800 MILLIGRAM(S): 2400 POWDER, FOR SUSPENSION ORAL at 09:49

## 2017-05-19 RX ADMIN — SEVELAMER CARBONATE 800 MILLIGRAM(S): 2400 POWDER, FOR SUSPENSION ORAL at 17:27

## 2017-05-19 RX ADMIN — Medication 1: at 09:48

## 2017-05-19 RX ADMIN — MIDODRINE HYDROCHLORIDE 30 MILLIGRAM(S): 2.5 TABLET ORAL at 06:28

## 2017-05-19 RX ADMIN — MIDODRINE HYDROCHLORIDE 30 MILLIGRAM(S): 2.5 TABLET ORAL at 20:34

## 2017-05-19 RX ADMIN — Medication 75 MICROGRAM(S): at 06:28

## 2017-05-19 RX ADMIN — AMIODARONE HYDROCHLORIDE 200 MILLIGRAM(S): 400 TABLET ORAL at 06:28

## 2017-05-19 RX ADMIN — MIDODRINE HYDROCHLORIDE 30 MILLIGRAM(S): 2.5 TABLET ORAL at 13:25

## 2017-05-19 RX ADMIN — SEVELAMER CARBONATE 800 MILLIGRAM(S): 2400 POWDER, FOR SUSPENSION ORAL at 13:25

## 2017-05-19 RX ADMIN — Medication 100 MILLIGRAM(S): at 13:25

## 2017-05-19 RX ADMIN — Medication 14.9 MICROGRAM(S)/KG/MIN: at 17:26

## 2017-05-19 RX ADMIN — Medication 14.9 MICROGRAM(S)/KG/MIN: at 20:36

## 2017-05-19 RX ADMIN — Medication 14.9 MICROGRAM(S)/KG/MIN: at 06:28

## 2017-05-19 NOTE — PROGRESS NOTE ADULT - PROBLEM SELECTOR PLAN 6
- Met with wife (Otto Plascencia) and patient today. Discussed goals of care and poor prognosis. Wife stated that she has no help at home, she is the primary caregiver and is responsible for transporting pt to and from dialysis. She is overwhelmed by the situation and needs more help at home.   - Pt and wife understand that despite being on dobutamine and frequent HD, pt's prognosis remains poor. Informed them about hospice services, however, pt doesn't qualify for hospice at this time because he is on dialysis. Pt and wife understand that there may be a time when we may need to reconsider the benefit of dialysis.   - Will follow up regarding logistics of getting HD 4 times a week as outpatient and will speak with CM regarding any additional services that pt may qualify for at home  - Pt is DNR/DNI and MOLST form completed today Met with wife (Otto Plascencia) and patient today. 30 minutes. Discussed goals of care and poor prognosis. Wife stated that she has no help at home, she is the primary caregiver and is responsible for transporting pt to and from dialysis. She is overwhelmed by the situation and needs more help at home.   - Pt and wife understand that despite being on dobutamine and frequent HD, pt's prognosis remains poor. Informed them about hospice services, however, pt doesn't qualify for hospice at this time because he is on dialysis. Pt and wife understand that there may be a time when we may need to reconsider the benefit of dialysis.   - Will follow up regarding logistics of getting HD 4 times a week as outpatient and will speak with CM regarding any additional services that pt may qualify for at home  - Pt is DNR/DNI and MOLST form completed today

## 2017-05-19 NOTE — PROGRESS NOTE ADULT - PROBLEM SELECTOR PLAN 2
Pt was on HD three times a week prior to this admission, currently requiring more frequent HD/UF  - pt will need HD 4 times a week as outpatient, will have to find out if logistically possible as outpatient

## 2017-05-19 NOTE — PROGRESS NOTE ADULT - ASSESSMENT
61 yo M with acute on chronic severe systolic CHF (EF 19%), ESRD, DM2, A fib:  - Acute on chronic systolic CHF - s/p right heart cath, dependent on Dobutamine drip, requiring more frequent HD for fluid removal, prognosis very poor, follow with CHF team  - ESRD - continue HD as per Renal, continue Midodrine, outpatient 4 times a week HD options being considered by Renal  - A fib - resumed Coumadin, continue with Amio  - DM2 - controlled, continue with ISS  - Poor prognosis, family meeting by Palliative care team today

## 2017-05-19 NOTE — PROGRESS NOTE ADULT - SUBJECTIVE AND OBJECTIVE BOX
INTERVAL HPI/OVERNIGHT EVENTS: No acute events overnights. Pt with no complaints currently. No dyspnea at rest.       DNR: YES  Completed on: 5/9/17                    MOLST: YES  Completed on: 5/19/17        PRESENT SYMPTOMS: SOURCE:  Patient/Family/Team    PAIN SCALE:  0 = none  1 = mild   2 = moderate  3 = severe    Pain: 0    Dyspnea: 1  Anxiety: NO  Fatigue: NO  Nausea: NO  Loss of Appetite: NO      MEDICATIONS  (STANDING):  finasteride 5milliGRAM(s) Oral daily  amiodarone    Tablet 200milliGRAM(s) Oral daily  atorvastatin 20milliGRAM(s) Oral at bedtime  docusate sodium 100milliGRAM(s) Oral daily  midodrine 30milliGRAM(s) Oral every 8 hours  sevelamer hydrochloride 800milliGRAM(s) Oral three times a day with meals  levothyroxine 75MICROGram(s) Oral daily  insulin lispro (HumaLOG) corrective regimen sliding scale  SubCutaneous three times a day before meals  insulin lispro (HumaLOG) corrective regimen sliding scale  SubCutaneous at bedtime  dextrose 5%. 1000milliLiter(s) IV Continuous <Continuous>  dextrose 50% Injectable 12.5Gram(s) IV Push once  dextrose 50% Injectable 25Gram(s) IV Push once  dextrose 50% Injectable 25Gram(s) IV Push once  DOBUTamine Infusion 5.002MICROgram(s)/kG/Min IV Continuous <Continuous>  warfarin 3milliGRAM(s) Oral once    MEDICATIONS  (PRN):  dextrose Gel 1Dose(s) Oral once PRN Blood Glucose LESS THAN 70 milliGRAM(s)/deciliter  glucagon  Injectable 1milliGRAM(s) IntraMuscular once PRN Glucose LESS THAN 70 milligrams/deciliter        Allergies    No Known Allergies    Intolerances      REVIEW OF SYSTEMS    General: no fevers, chills or weight loss	    Skin/Breast: no rash    Respiratory and Thorax: + dyspnea on exertion  	  Cardiovascular: no chest pain	    Gastrointestinal:	 no nausea, vomiting, abdominal pain, or constipation	    Musculoskeletal: L shoulder pain	    Neurological: no headaches or dizziness	    Psychiatric: no anxiety/depression  	    Allergic/Immunologic:	    OTHER SYMPTOMS: NO    Karnofsky Performance Score/Palliative Performance Status Version 2:   30%    PHYSICAL EXAM:    Constitutional: resting in bed, no apparent distress    Eyes: EOMI, PERRLA    Respiratory: mildly dyspneic, bibasilar crackles    Cardiovascular: RRR, S1,S2, b/l LE 2+ pitting edema    Gastrointestinal: +BS, soft, NT/ND    Extremities: no clubbing, b/l 2+ pitting LE edema    Neurological: AAO x 3, no focal deficits    Skin: warm and dry    Psychiatric: calm and pleasant     Vital Signs Last 24 Hrs  T(C): 36.4, Max: 36.6 (05-18 @ 15:40)  T(F): 97.6, Max: 97.9 (05-18 @ 19:45)  HR: 83 (77 - 83)  BP: 99/59 (97/68 - 121/77)  BP(mean): --  RR: 18 (17 - 18)  SpO2: 97% (95% - 100%)    LABS:                        10.6   5.13  )-----------( 149      ( 19 May 2017 06:30 )             35.2   05-19    134<L>  |  96<L>  |  27<H>  ----------------------------<  97  4.3   |  25  |  5.17<H>    Ca    9.1      19 May 2017 06:30  Phos  3.7     05-19  Mg     2.1     05-19  PT/INR - ( 19 May 2017 06:30 )   PT: 20.4 SEC;   INR: 1.80

## 2017-05-19 NOTE — PROGRESS NOTE ADULT - PROBLEM SELECTOR PLAN 2
On Dobutamine, titration as per CHF service.   Increase UF goal as tolerated.   Improved hemodynamics during HD. Cont midodrine preHD.

## 2017-05-19 NOTE — PROGRESS NOTE ADULT - SUBJECTIVE AND OBJECTIVE BOX
Pt seen and examined.   No acute events overnight. Denies SOB or chest pain.     No Known Allergies    Hospital Medications:   MEDICATIONS  (STANDING):  finasteride 5milliGRAM(s) Oral daily  amiodarone    Tablet 200milliGRAM(s) Oral daily  atorvastatin 20milliGRAM(s) Oral at bedtime  docusate sodium 100milliGRAM(s) Oral daily  midodrine 30milliGRAM(s) Oral every 8 hours  sevelamer hydrochloride 800milliGRAM(s) Oral three times a day with meals  levothyroxine 75MICROGram(s) Oral daily  insulin lispro (HumaLOG) corrective regimen sliding scale  SubCutaneous three times a day before meals  insulin lispro (HumaLOG) corrective regimen sliding scale  SubCutaneous at bedtime  dextrose 5%. 1000milliLiter(s) IV Continuous <Continuous>  dextrose 50% Injectable 12.5Gram(s) IV Push once  dextrose 50% Injectable 25Gram(s) IV Push once  dextrose 50% Injectable 25Gram(s) IV Push once  DOBUTamine Infusion 5.002MICROgram(s)/kG/Min IV Continuous <Continuous>    VITALS:  T(F): 97.6, Max: 97.9 (05-18 @ 19:45)  HR: 83  BP: 99/59  RR: 18  SpO2: 97%  Wt(kg): --    I & Os for current day (as of 05-19 @ 10:56)  =============================================  IN: 500 ml / OUT: 3600 ml / NET: -3100 ml      PHYSICAL EXAM:  Constitutional: NAD  HEENT: anicteric sclera, oropharynx clear, MMM  Neck: No JVD  Respiratory: Bibasilar crackles.   Cardiovascular: S1, S2, RRR  Gastrointestinal: BS+, soft, NT. Distended.   Extremities: No cyanosis or clubbing. Trace peripheral edema  Neurological: A/O x 3, no focal deficits  Psychiatric: Normal mood, normal affect  : No CVA tenderness. No rosa.   Skin: No rashes  Vascular Access: [facility] Tunneled cath     LABS:  05-19    134<L>  |  96<L>  |  27<H>  ----------------------------<  97  4.3   |  25  |  5.17<H>    Ca    9.1      19 May 2017 06:30  Phos  3.7     05-19  Mg     2.1     05-19      Creatinine Trend: 5.17 <--, 6.09 <--, 5.02 <--, 6.64 <--, 5.26 <--, 4.56 <--, 5.03 <--                        10.6   5.13  )-----------( 149      ( 19 May 2017 06:30 )             35.2

## 2017-05-19 NOTE — PROGRESS NOTE ADULT - PROBLEM SELECTOR PLAN 1
Pt had HD yesterday. Flowsheet reviewed.   UF 3.1 kg achieved. Plan for isolated UF today, and repeat HD tomorrow.   Electrolytes acceptable.  Tolerating daily UF.   Not logistically possible to dialyze 4 times weekly in outpt setting for extended period of time.  Agree with pall care consult to discuss GOC, in setting of severe CM and ESRD with frequent volume overload and hypotension.

## 2017-05-20 DIAGNOSIS — E87.1 HYPO-OSMOLALITY AND HYPONATREMIA: ICD-10-CM

## 2017-05-20 DIAGNOSIS — N18.9 CHRONIC KIDNEY DISEASE, UNSPECIFIED: ICD-10-CM

## 2017-05-20 LAB
BASOPHILS # BLD AUTO: 0.02 K/UL — SIGNIFICANT CHANGE UP (ref 0–0.2)
BASOPHILS NFR BLD AUTO: 0.7 % — SIGNIFICANT CHANGE UP (ref 0–2)
BUN SERPL-MCNC: 31 MG/DL — HIGH (ref 7–23)
BUN SERPL-MCNC: 31 MG/DL — HIGH (ref 7–23)
CALCIUM SERPL-MCNC: 8.9 MG/DL — SIGNIFICANT CHANGE UP (ref 8.4–10.5)
CALCIUM SERPL-MCNC: 8.9 MG/DL — SIGNIFICANT CHANGE UP (ref 8.4–10.5)
CHLORIDE SERPL-SCNC: 95 MMOL/L — LOW (ref 98–107)
CHLORIDE SERPL-SCNC: 95 MMOL/L — LOW (ref 98–107)
CO2 SERPL-SCNC: 25 MMOL/L — SIGNIFICANT CHANGE UP (ref 22–31)
CO2 SERPL-SCNC: 25 MMOL/L — SIGNIFICANT CHANGE UP (ref 22–31)
CREAT SERPL-MCNC: 5.45 MG/DL — HIGH (ref 0.5–1.3)
CREAT SERPL-MCNC: 5.45 MG/DL — HIGH (ref 0.5–1.3)
EOSINOPHIL # BLD AUTO: 0.06 K/UL — SIGNIFICANT CHANGE UP (ref 0–0.5)
EOSINOPHIL NFR BLD AUTO: 2 % — SIGNIFICANT CHANGE UP (ref 0–6)
GLUCOSE SERPL-MCNC: 93 MG/DL — SIGNIFICANT CHANGE UP (ref 70–99)
GLUCOSE SERPL-MCNC: 93 MG/DL — SIGNIFICANT CHANGE UP (ref 70–99)
HBV SURFACE AG SER-ACNC: NEGATIVE — SIGNIFICANT CHANGE UP
HCT VFR BLD CALC: 33.4 % — LOW (ref 39–50)
HCT VFR BLD CALC: 33.4 % — LOW (ref 39–50)
HGB BLD-MCNC: 10.2 G/DL — LOW (ref 13–17)
HGB BLD-MCNC: 10.2 G/DL — LOW (ref 13–17)
IMM GRANULOCYTES NFR BLD AUTO: 0.3 % — SIGNIFICANT CHANGE UP (ref 0–1.5)
INR BLD: 1.99 — HIGH (ref 0.88–1.17)
LYMPHOCYTES # BLD AUTO: 0.76 K/UL — LOW (ref 1–3.3)
LYMPHOCYTES # BLD AUTO: 25.1 % — SIGNIFICANT CHANGE UP (ref 13–44)
MAGNESIUM SERPL-MCNC: 2.1 MG/DL — SIGNIFICANT CHANGE UP (ref 1.6–2.6)
MAGNESIUM SERPL-MCNC: 2.1 MG/DL — SIGNIFICANT CHANGE UP (ref 1.6–2.6)
MCHC RBC-ENTMCNC: 28.3 PG — SIGNIFICANT CHANGE UP (ref 27–34)
MCHC RBC-ENTMCNC: 28.3 PG — SIGNIFICANT CHANGE UP (ref 27–34)
MCHC RBC-ENTMCNC: 30.5 % — LOW (ref 32–36)
MCHC RBC-ENTMCNC: 30.5 % — LOW (ref 32–36)
MCV RBC AUTO: 92.5 FL — SIGNIFICANT CHANGE UP (ref 80–100)
MCV RBC AUTO: 92.5 FL — SIGNIFICANT CHANGE UP (ref 80–100)
MONOCYTES # BLD AUTO: 0.44 K/UL — SIGNIFICANT CHANGE UP (ref 0–0.9)
MONOCYTES NFR BLD AUTO: 14.5 % — HIGH (ref 2–14)
NEUTROPHILS # BLD AUTO: 1.74 K/UL — LOW (ref 1.8–7.4)
NEUTROPHILS NFR BLD AUTO: 57.4 % — SIGNIFICANT CHANGE UP (ref 43–77)
PHOSPHATE SERPL-MCNC: 3.4 MG/DL — SIGNIFICANT CHANGE UP (ref 2.5–4.5)
PLATELET # BLD AUTO: 163 K/UL — SIGNIFICANT CHANGE UP (ref 150–400)
PLATELET # BLD AUTO: 163 K/UL — SIGNIFICANT CHANGE UP (ref 150–400)
PMV BLD: 12.3 FL — SIGNIFICANT CHANGE UP (ref 7–13)
PMV BLD: 12.3 FL — SIGNIFICANT CHANGE UP (ref 7–13)
POTASSIUM SERPL-MCNC: 4.3 MMOL/L — SIGNIFICANT CHANGE UP (ref 3.5–5.3)
POTASSIUM SERPL-MCNC: 4.3 MMOL/L — SIGNIFICANT CHANGE UP (ref 3.5–5.3)
POTASSIUM SERPL-SCNC: 4.3 MMOL/L — SIGNIFICANT CHANGE UP (ref 3.5–5.3)
POTASSIUM SERPL-SCNC: 4.3 MMOL/L — SIGNIFICANT CHANGE UP (ref 3.5–5.3)
PROTHROM AB SERPL-ACNC: 22.6 SEC — HIGH (ref 9.8–13.1)
RBC # BLD: 3.61 M/UL — LOW (ref 4.2–5.8)
RBC # BLD: 3.61 M/UL — LOW (ref 4.2–5.8)
RBC # FLD: 17.3 % — HIGH (ref 10.3–14.5)
RBC # FLD: 17.3 % — HIGH (ref 10.3–14.5)
SODIUM SERPL-SCNC: 133 MMOL/L — LOW (ref 135–145)
SODIUM SERPL-SCNC: 133 MMOL/L — LOW (ref 135–145)
WBC # BLD: 3.03 K/UL — LOW (ref 3.8–10.5)
WBC # BLD: 3.03 K/UL — LOW (ref 3.8–10.5)
WBC # FLD AUTO: 3.03 K/UL — LOW (ref 3.8–10.5)
WBC # FLD AUTO: 3.03 K/UL — LOW (ref 3.8–10.5)

## 2017-05-20 RX ORDER — WARFARIN SODIUM 2.5 MG/1
3 TABLET ORAL ONCE
Qty: 0 | Refills: 0 | Status: COMPLETED | OUTPATIENT
Start: 2017-05-20 | End: 2017-05-20

## 2017-05-20 RX ORDER — ACETAMINOPHEN 500 MG
650 TABLET ORAL EVERY 6 HOURS
Qty: 0 | Refills: 0 | Status: DISCONTINUED | OUTPATIENT
Start: 2017-05-20 | End: 2017-06-23

## 2017-05-20 RX ADMIN — Medication 14.9 MICROGRAM(S)/KG/MIN: at 17:39

## 2017-05-20 RX ADMIN — Medication 14.9 MICROGRAM(S)/KG/MIN: at 21:21

## 2017-05-20 RX ADMIN — WARFARIN SODIUM 3 MILLIGRAM(S): 2.5 TABLET ORAL at 17:40

## 2017-05-20 RX ADMIN — MIDODRINE HYDROCHLORIDE 30 MILLIGRAM(S): 2.5 TABLET ORAL at 05:31

## 2017-05-20 RX ADMIN — Medication 100 MILLIGRAM(S): at 13:52

## 2017-05-20 RX ADMIN — SEVELAMER CARBONATE 800 MILLIGRAM(S): 2400 POWDER, FOR SUSPENSION ORAL at 17:39

## 2017-05-20 RX ADMIN — ATORVASTATIN CALCIUM 20 MILLIGRAM(S): 80 TABLET, FILM COATED ORAL at 21:22

## 2017-05-20 RX ADMIN — Medication 650 MILLIGRAM(S): at 17:40

## 2017-05-20 RX ADMIN — AMIODARONE HYDROCHLORIDE 200 MILLIGRAM(S): 400 TABLET ORAL at 05:30

## 2017-05-20 RX ADMIN — MIDODRINE HYDROCHLORIDE 30 MILLIGRAM(S): 2.5 TABLET ORAL at 21:22

## 2017-05-20 RX ADMIN — Medication 75 MICROGRAM(S): at 05:30

## 2017-05-20 RX ADMIN — SEVELAMER CARBONATE 800 MILLIGRAM(S): 2400 POWDER, FOR SUSPENSION ORAL at 13:52

## 2017-05-20 RX ADMIN — MIDODRINE HYDROCHLORIDE 30 MILLIGRAM(S): 2.5 TABLET ORAL at 13:52

## 2017-05-20 RX ADMIN — FINASTERIDE 5 MILLIGRAM(S): 5 TABLET, FILM COATED ORAL at 13:52

## 2017-05-20 RX ADMIN — Medication 14.9 MICROGRAM(S)/KG/MIN: at 05:31

## 2017-05-20 NOTE — PROGRESS NOTE ADULT - SUBJECTIVE AND OBJECTIVE BOX
Patient is a 62y Male with Chronic hypotension  AF (atrial fibrillation)  COPD (chronic obstructive pulmonary disease)  HLD (hyperlipidemia)  DM (diabetes mellitus)  ESRD (end stage renal disease) on dialysis  BPH (benign prostatic hypertrophy)  Myocardial infarction  Chronic renal insufficiency  Gout  Dyslipidemia  Diabetes mellitus  CHF (congestive heart failure)   admitted for Syncope and collapse      Patient seen and examined bedside    SUBJECTIVE / OVERNIGHT EVENTS: No overnight events, sob resolved. No complaints today. feeling better    Allergies: No Known Allergies    Hospital Medications:   MEDICATIONS  (STANDING):  finasteride 5milliGRAM(s) Oral daily  amiodarone    Tablet 200milliGRAM(s) Oral daily  atorvastatin 20milliGRAM(s) Oral at bedtime  docusate sodium 100milliGRAM(s) Oral daily  midodrine 30milliGRAM(s) Oral every 8 hours  sevelamer hydrochloride 800milliGRAM(s) Oral three times a day with meals  levothyroxine 75MICROGram(s) Oral daily  insulin lispro (HumaLOG) corrective regimen sliding scale  SubCutaneous three times a day before meals  insulin lispro (HumaLOG) corrective regimen sliding scale  SubCutaneous at bedtime  dextrose 5%. 1000milliLiter(s) IV Continuous <Continuous>  dextrose 50% Injectable 12.5Gram(s) IV Push once  dextrose 50% Injectable 25Gram(s) IV Push once  dextrose 50% Injectable 25Gram(s) IV Push once  DOBUTamine Infusion 5.002MICROgram(s)/kG/Min IV Continuous <Continuous>    REVIEW OF SYSTEMS:  CONSTITUTIONAL: No weakness, fevers or chills  EYES/ENT: No visual changes;  No vertigo or throat pain   NECK: No pain or stiffness  RESPIRATORY: No cough, wheezing, hemoptysis; No shortness of breath  CARDIOVASCULAR: No chest pain or palpitations.  GASTROINTESTINAL: No abdominal or epigastric pain. No nausea, vomiting, or hematemesis; No diarrhea or constipation. No melena or hematochezia.  GENITOURINARY: No dysuria, frequency, foamy urine, urinary urgency, incontinence or hematuria  NEUROLOGICAL: No numbness or weakness  SKIN: No itching, burning, rashes, or lesions   VASCULAR: No bilateral lower extremity edema.   All other review of systems is negative unless indicated above.    VITALS:  T(F): 100.5, Max: 100.5 (05-20 @ 15:27)  HR: 88  BP: 104/64  RR: 18  SpO2: 90%  Wt(kg): --  I & Os for 24h ending 05-20 @ 07:00  =============================================  IN: 400 ml / OUT: 2000 ml / NET: -1600 ml    I & Os for current day (as of 05-20 @ 17:55)  =============================================  IN: 400 ml / OUT: 2900 ml / NET: -2500 ml        PHYSICAL EXAM:  Constitutional: NAD  HEENT: anicteric sclera, oropharynx clear  Neck: No JVD  Respiratory: CTAB, no wheezes, rales or rhonchi  Cardiovascular: S1, S2, RRR  Gastrointestinal: BS+, soft, NT/ND  Extremities: No cyanosis or clubbing. No peripheral edema  Neurological: A/O x 3, no focal deficits  Psychiatric: Normal mood, normal affect  : No CVA tenderness. No rosa.   Skin: No rashes  Vascular Access:    LABS:  05-20    133<L>  |  95<L>  |  31<H>  ----------------------------<  93  4.3   |  25  |  5.45<H>    Ca    8.9      20 May 2017 06:10  Phos  3.4     05-20  Mg     2.1     05-20      Creatinine Trend: 5.45 <--, 5.17 <--, 6.09 <--, 5.02 <--, 6.64 <--, 5.26 <--, 4.56 <--                        10.2   3.03  )-----------( 163      ( 20 May 2017 06:10 )             33.4     Urine Studies:      RADIOLOGY & ADDITIONAL STUDIES: Patient seen and examined bedside    No acute events overnight. Denies SOB or chest pain.  Allergies: No Known Allergies    Hospital Medications:   MEDICATIONS  (STANDING):  finasteride 5milliGRAM(s) Oral daily  amiodarone    Tablet 200milliGRAM(s) Oral daily  atorvastatin 20milliGRAM(s) Oral at bedtime  docusate sodium 100milliGRAM(s) Oral daily  midodrine 30milliGRAM(s) Oral every 8 hours  sevelamer hydrochloride 800milliGRAM(s) Oral three times a day with meals  levothyroxine 75MICROGram(s) Oral daily  insulin lispro (HumaLOG) corrective regimen sliding scale  SubCutaneous three times a day before meals  insulin lispro (HumaLOG) corrective regimen sliding scale  SubCutaneous at bedtime  dextrose 5%. 1000milliLiter(s) IV Continuous <Continuous>  dextrose 50% Injectable 12.5Gram(s) IV Push once  dextrose 50% Injectable 25Gram(s) IV Push once  dextrose 50% Injectable 25Gram(s) IV Push once  DOBUTamine Infusion 5.002MICROgram(s)/kG/Min IV Continuous <Continuous>    REVIEW OF SYSTEMS:  CONSTITUTIONAL: No weakness, fevers or chills  EYES/ENT: No visual changes;  No vertigo or throat pain   NECK: No pain or stiffness  RESPIRATORY: No cough, wheezing, hemoptysis; No shortness of breath  CARDIOVASCULAR: No chest pain or palpitations.  GASTROINTESTINAL: No abdominal or epigastric pain. No nausea, vomiting, or hematemesis; No diarrhea or constipation. No melena or hematochezia.  GENITOURINARY: No dysuria, frequency, foamy urine, urinary urgency, incontinence or hematuria  NEUROLOGICAL: No numbness or weakness  SKIN: No itching, burning, rashes, or lesions   VASCULAR: No bilateral lower extremity edema.   All other review of systems is negative unless indicated above.    VITALS:  T(F): 100.5, Max: 100.5 (05-20 @ 15:27)  HR: 88  BP: 104/64  RR: 18  SpO2: 90%  Wt(kg): --  I & Os for 24h ending 05-20 @ 07:00  =============================================  IN: 400 ml / OUT: 2000 ml / NET: -1600 ml    I & Os for current day (as of 05-20 @ 17:55)  =============================================  IN: 400 ml / OUT: 2900 ml / NET: -2500 ml        PHYSICAL EXAM:  Constitutional: NAD  HEENT: anicteric sclera, oropharynx clear  Neck: +JVD  Respiratory: Bibasilar crackles, no wheezes, rales or rhonchi  Cardiovascular: S1, S2, RRR  Gastrointestinal: BS+, soft, NT/ND  Extremities: No cyanosis or clubbing. trace peripheral edema  Neurological: A/O x 3, no focal deficits  Psychiatric: Normal mood, normal affect  : No CVA tenderness. No rosa.   Skin: No rashes  Vascular Access: [facility] Tunneled cath     LABS:  05-20    133<L>  |  95<L>  |  31<H>  ----------------------------<  93  4.3   |  25  |  5.45<H>    Ca    8.9      20 May 2017 06:10  Phos  3.4     05-20  Mg     2.1     05-20                        10.2   3.03  )-----------( 163      ( 20 May 2017 06:10 )             33.4       RADIOLOGY & ADDITIONAL STUDIES:

## 2017-05-20 NOTE — PROGRESS NOTE ADULT - PROBLEM SELECTOR PLAN 2
On Dobutamine, titration as per CHF service.   Increase UF goal as tolerated.   Improved hemodynamics during HD. Cont midodrine preHD. On Dobutamine drip, titration as per CHF service.   Improved hemodynamics during HD. Cont midodrine preHD.

## 2017-05-20 NOTE — PROGRESS NOTE ADULT - ASSESSMENT
63 yo M with acute on chronic severe systolic CHF (EF 19%), ESRD, DM2, A fib:  - Acute on chronic systolic CHF - s/p right heart cath, dependent on Dobutamine drip, requiring more frequent HD for fluid removal, prognosis very poor, follow with CHF team  - ESRD - continue HD as per Renal, continue Midodrine, outpatient 4 times a week HD options being considered by Renal  - A fib - resumed Coumadin, continue with Amio  - DM2 - controlled, continue with ISS  - Poor prognosis, family meeting by Palliative care team noted  cont current tx

## 2017-05-20 NOTE — PROGRESS NOTE ADULT - SUBJECTIVE AND OBJECTIVE BOX
CHIEF COMPLAINT:no new complaints   sob/ flores    	            PAST MEDICAL & SURGICAL HISTORY:  Chronic hypotension  AF (atrial fibrillation)  COPD (chronic obstructive pulmonary disease): 4L home O2  HLD (hyperlipidemia)  DM (diabetes mellitus)  ESRD (end stage renal disease) on dialysis  BPH (benign prostatic hypertrophy)  Myocardial infarction: 10/2011  Chronic renal insufficiency  Gout  Dyslipidemia  Diabetes mellitus  CHF (congestive heart failure)  AICD (automatic cardioverter/defibrillator) present: Biot[employer] - placed 9/11/09  H/O coronary angiogram            REVIEW OF SYSTEMS:  CONSTITUTIONAL: No fever, weight loss, or fatigue  EYES: No eye pain, visual disturbances, or discharge  NECK: No pain or stiffness  RESPIRATORY: No cough, wheezing, chills or hemoptysis; Shortness of Breath  CARDIOVASCULAR: No chest pain, palpitations, passing out, dizziness, or leg swelling  GASTROINTESTINAL: No abdominal or epigastric pain. No nausea, vomiting, or hematemesis; No diarrhea or constipation. No melena or hematochezia.  GENITOURINARY: No dysuria, frequency, hematuria, or incontinence  NEUROLOGICAL: No headaches, memory loss, loss of strength, numbness, or tremors  SKIN: No itching, burning, rashes, or lesions   LYMPH Nodes: No enlarged glands  ENDOCRINE: No heat or cold intolerance; No hair loss  MUSCULOSKELETAL: No joint pain or swelling; No muscle, back, or extremity pain    Medications:  MEDICATIONS  (STANDING):  finasteride 5milliGRAM(s) Oral daily  amiodarone    Tablet 200milliGRAM(s) Oral daily  atorvastatin 20milliGRAM(s) Oral at bedtime  docusate sodium 100milliGRAM(s) Oral daily  midodrine 30milliGRAM(s) Oral every 8 hours  sevelamer hydrochloride 800milliGRAM(s) Oral three times a day with meals  levothyroxine 75MICROGram(s) Oral daily  insulin lispro (HumaLOG) corrective regimen sliding scale  SubCutaneous three times a day before meals  insulin lispro (HumaLOG) corrective regimen sliding scale  SubCutaneous at bedtime  dextrose 5%. 1000milliLiter(s) IV Continuous <Continuous>  dextrose 50% Injectable 12.5Gram(s) IV Push once  dextrose 50% Injectable 25Gram(s) IV Push once  dextrose 50% Injectable 25Gram(s) IV Push once  DOBUTamine Infusion 5.002MICROgram(s)/kG/Min IV Continuous <Continuous>  warfarin 3milliGRAM(s) Oral once    MEDICATIONS  (PRN):  dextrose Gel 1Dose(s) Oral once PRN Blood Glucose LESS THAN 70 milliGRAM(s)/deciliter  glucagon  Injectable 1milliGRAM(s) IntraMuscular once PRN Glucose LESS THAN 70 milligrams/deciliter    	    PHYSICAL EXAM:  T(C): 36.8, Max: 36.9 (05-20 @ 00:10)  HR: 83 (78 - 92)  BP: 108/61 (88/52 - 132/90)  RR: 19 (17 - 19)  SpO2: 97% (96% - 97%)  Wt(kg): --  I&O's Summary    I & Os for current day (as of 20 May 2017 09:49)  =============================================  IN: 400 ml / OUT: 2000 ml / NET: -1600 ml      Appearance: Normal	  HEENT:   Normal oral mucosa, PERRL, EOMI	  Lymphatic: No lymphadenopathy  Cardiovascular: Normal S1 S2, No JVD, No murmurs, No edema  Respiratory: dec bs 	  Psychiatry: A & O x 3, Mood & affect appropriate  Gastrointestinal:  Soft, Non-tender, + BS	  Skin: No rashes, No ecchymoses, No cyanosis	  Neurologic: Non-focal  Extremities: Normal range of motion, + edema  Vascular: Peripheral pulses palpable 2+ bilaterally    TELEMETRY: 	    ECG:  	  RADIOLOGY:  OTHER: 	  	  LABS:	 	    CARDIAC MARKERS:                                10.2   3.03  )-----------( 163      ( 20 May 2017 06:10 )             33.4     05-20    133<L>  |  95<L>  |  31<H>  ----------------------------<  93  4.3   |  25  |  5.45<H>    Ca    8.9      20 May 2017 06:10  Phos  3.4     05-20  Mg     2.1     05-20      proBNP:   Lipid Profile:   HgA1c:   TSH:

## 2017-05-20 NOTE — PROGRESS NOTE ADULT - PROBLEM SELECTOR PLAN 1
Pt had HD yesterday. Flowsheet reviewed.   UF 3.1 kg achieved. Plan for isolated UF today, and repeat HD tomorrow.   Electrolytes acceptable.  Tolerating daily UF.   Not logistically possible to dialyze 4 times weekly in outpt setting for extended period of time.  Agree with pall care consult to discuss GOC, in setting of severe CM and ESRD with frequent volume overload and hypotension. Pt had HD earlier today. Flowsheet reviewed. net UF 2.5 kg removed. s/p isolated UF yesterday for net uf 1.6L.   Electrolytes acceptable. c/w renal diet, fluid restriction. d/w pt  f/u w/pall care to discuss GOC, in setting of severe CM and ESRD with frequent volume overload and hypotension.

## 2017-05-21 DIAGNOSIS — K59.00 CONSTIPATION, UNSPECIFIED: ICD-10-CM

## 2017-05-21 LAB
ANISOCYTOSIS BLD QL: SLIGHT — SIGNIFICANT CHANGE UP
BASOPHILS # BLD AUTO: 0.01 K/UL — SIGNIFICANT CHANGE UP (ref 0–0.2)
BASOPHILS NFR BLD AUTO: 0.1 % — SIGNIFICANT CHANGE UP (ref 0–2)
BASOPHILS NFR SPEC: 0 % — SIGNIFICANT CHANGE UP (ref 0–2)
BUN SERPL-MCNC: 25 MG/DL — HIGH (ref 7–23)
CALCIUM SERPL-MCNC: 9 MG/DL — SIGNIFICANT CHANGE UP (ref 8.4–10.5)
CHLORIDE SERPL-SCNC: 97 MMOL/L — LOW (ref 98–107)
CO2 SERPL-SCNC: 24 MMOL/L — SIGNIFICANT CHANGE UP (ref 22–31)
CREAT SERPL-MCNC: 5.23 MG/DL — HIGH (ref 0.5–1.3)
EOSINOPHIL # BLD AUTO: 0.06 K/UL — SIGNIFICANT CHANGE UP (ref 0–0.5)
EOSINOPHIL NFR BLD AUTO: 0.7 % — SIGNIFICANT CHANGE UP (ref 0–6)
EOSINOPHIL NFR FLD: 1 % — SIGNIFICANT CHANGE UP (ref 0–6)
GIANT PLATELETS BLD QL SMEAR: PRESENT — SIGNIFICANT CHANGE UP
GLUCOSE SERPL-MCNC: 132 MG/DL — HIGH (ref 70–99)
HCT VFR BLD CALC: 33.8 % — LOW (ref 39–50)
HGB BLD-MCNC: 10.2 G/DL — LOW (ref 13–17)
IMM GRANULOCYTES NFR BLD AUTO: 0.6 % — SIGNIFICANT CHANGE UP (ref 0–1.5)
INR BLD: 2.61 — HIGH (ref 0.88–1.17)
LG PLATELETS BLD QL AUTO: SLIGHT — SIGNIFICANT CHANGE UP
LYMPHOCYTES # BLD AUTO: 1.23 K/UL — SIGNIFICANT CHANGE UP (ref 1–3.3)
LYMPHOCYTES # BLD AUTO: 14.2 % — SIGNIFICANT CHANGE UP (ref 13–44)
LYMPHOCYTES NFR SPEC AUTO: 14 % — SIGNIFICANT CHANGE UP (ref 13–44)
MAGNESIUM SERPL-MCNC: 2.1 MG/DL — SIGNIFICANT CHANGE UP (ref 1.6–2.6)
MANUAL SMEAR VERIFICATION: SIGNIFICANT CHANGE UP
MCHC RBC-ENTMCNC: 28.2 PG — SIGNIFICANT CHANGE UP (ref 27–34)
MCHC RBC-ENTMCNC: 30.2 % — LOW (ref 32–36)
MCV RBC AUTO: 93.4 FL — SIGNIFICANT CHANGE UP (ref 80–100)
MONOCYTES # BLD AUTO: 1.78 K/UL — HIGH (ref 0–0.9)
MONOCYTES NFR BLD AUTO: 20.5 % — HIGH (ref 2–14)
MONOCYTES NFR BLD: 20 % — HIGH (ref 2–9)
NEUTROPHIL AB SER-ACNC: 62 % — SIGNIFICANT CHANGE UP (ref 43–77)
NEUTROPHILS # BLD AUTO: 5.56 K/UL — SIGNIFICANT CHANGE UP (ref 1.8–7.4)
NEUTROPHILS NFR BLD AUTO: 63.9 % — SIGNIFICANT CHANGE UP (ref 43–77)
NEUTS BAND # BLD: 3 % — SIGNIFICANT CHANGE UP (ref 0–6)
PLATELET # BLD AUTO: 145 K/UL — LOW (ref 150–400)
PLATELET CLUMP BLD QL SMEAR: SLIGHT — SIGNIFICANT CHANGE UP
PLATELET COUNT - ESTIMATE: NORMAL — SIGNIFICANT CHANGE UP
PMV BLD: 11.4 FL — SIGNIFICANT CHANGE UP (ref 7–13)
POTASSIUM SERPL-MCNC: 4.8 MMOL/L — SIGNIFICANT CHANGE UP (ref 3.5–5.3)
POTASSIUM SERPL-SCNC: 4.8 MMOL/L — SIGNIFICANT CHANGE UP (ref 3.5–5.3)
PROTHROM AB SERPL-ACNC: 29.8 SEC — HIGH (ref 9.8–13.1)
RBC # BLD: 3.62 M/UL — LOW (ref 4.2–5.8)
RBC # FLD: 17.4 % — HIGH (ref 10.3–14.5)
SODIUM SERPL-SCNC: 134 MMOL/L — LOW (ref 135–145)
SPECIMEN SOURCE: SIGNIFICANT CHANGE UP
WBC # BLD: 8.69 K/UL — SIGNIFICANT CHANGE UP (ref 3.8–10.5)
WBC # FLD AUTO: 8.69 K/UL — SIGNIFICANT CHANGE UP (ref 3.8–10.5)

## 2017-05-21 PROCEDURE — 71010: CPT | Mod: 26

## 2017-05-21 RX ORDER — WARFARIN SODIUM 2.5 MG/1
2 TABLET ORAL ONCE
Qty: 0 | Refills: 0 | Status: COMPLETED | OUTPATIENT
Start: 2017-05-21 | End: 2017-05-21

## 2017-05-21 RX ORDER — WARFARIN SODIUM 2.5 MG/1
5 TABLET ORAL ONCE
Qty: 0 | Refills: 0 | Status: DISCONTINUED | OUTPATIENT
Start: 2017-05-21 | End: 2017-05-21

## 2017-05-21 RX ORDER — ERYTHROPOIETIN 10000 [IU]/ML
2000 INJECTION, SOLUTION INTRAVENOUS; SUBCUTANEOUS
Qty: 0 | Refills: 0 | Status: DISCONTINUED | OUTPATIENT
Start: 2017-05-21 | End: 2017-05-29

## 2017-05-21 RX ADMIN — AMIODARONE HYDROCHLORIDE 200 MILLIGRAM(S): 400 TABLET ORAL at 05:18

## 2017-05-21 RX ADMIN — Medication 75 MICROGRAM(S): at 05:18

## 2017-05-21 RX ADMIN — MIDODRINE HYDROCHLORIDE 30 MILLIGRAM(S): 2.5 TABLET ORAL at 05:18

## 2017-05-21 RX ADMIN — WARFARIN SODIUM 2 MILLIGRAM(S): 2.5 TABLET ORAL at 17:24

## 2017-05-21 RX ADMIN — SEVELAMER CARBONATE 800 MILLIGRAM(S): 2400 POWDER, FOR SUSPENSION ORAL at 09:18

## 2017-05-21 RX ADMIN — SEVELAMER CARBONATE 800 MILLIGRAM(S): 2400 POWDER, FOR SUSPENSION ORAL at 17:24

## 2017-05-21 RX ADMIN — FINASTERIDE 5 MILLIGRAM(S): 5 TABLET, FILM COATED ORAL at 11:54

## 2017-05-21 RX ADMIN — ATORVASTATIN CALCIUM 20 MILLIGRAM(S): 80 TABLET, FILM COATED ORAL at 21:25

## 2017-05-21 RX ADMIN — SEVELAMER CARBONATE 800 MILLIGRAM(S): 2400 POWDER, FOR SUSPENSION ORAL at 11:54

## 2017-05-21 RX ADMIN — MIDODRINE HYDROCHLORIDE 30 MILLIGRAM(S): 2.5 TABLET ORAL at 13:01

## 2017-05-21 RX ADMIN — MIDODRINE HYDROCHLORIDE 30 MILLIGRAM(S): 2.5 TABLET ORAL at 21:25

## 2017-05-21 RX ADMIN — Medication 650 MILLIGRAM(S): at 02:44

## 2017-05-21 RX ADMIN — Medication 14.9 MICROGRAM(S)/KG/MIN: at 06:49

## 2017-05-21 RX ADMIN — Medication 14.9 MICROGRAM(S)/KG/MIN: at 11:54

## 2017-05-21 RX ADMIN — Medication 100 MILLIGRAM(S): at 11:54

## 2017-05-21 NOTE — PROGRESS NOTE ADULT - ASSESSMENT
63 yo M with acute on chronic severe systolic CHF (EF 19%), ESRD, DM2, A fib:  - Acute on chronic systolic CHF - s/p right heart cath, dependent on Dobutamine drip, requiring more frequent HD for fluid removal, prognosis very poor, follow with CHF team  - ESRD - continue HD as per Renal, continue Midodrine, outpatient hd as per renal  - A fib - resumed Coumadin, continue with Amio  - DM2 - controlled, continue with ISS  - Poor prognosis, family meeting by Palliative care team noted  cont current tx

## 2017-05-21 NOTE — PROGRESS NOTE ADULT - SUBJECTIVE AND OBJECTIVE BOX
CHIEF COMPLAINT:Pt approximately  the same    	        PAST MEDICAL & SURGICAL HISTORY:  Chronic hypotension  AF (atrial fibrillation)  COPD (chronic obstructive pulmonary disease): 4L home O2  HLD (hyperlipidemia)  DM (diabetes mellitus)  ESRD (end stage renal disease) on dialysis  BPH (benign prostatic hypertrophy)  Myocardial infarction: 10/2011  Chronic renal insufficiency  Gout  Dyslipidemia  Diabetes mellitus  CHF (congestive heart failure)  AICD (automatic cardioverter/defibrillator) present: Biot[employer] - placed 9/11/09  H/O coronary angiogram          REVIEW OF SYSTEMS:  CONSTITUTIONAL: No fever, weight loss, or fatigue  EYES: No eye pain, visual disturbances, or discharge  NECK: No pain or stiffness  RESPIRATORY: No cough, wheezing, chills or hemoptysis; No Shortness of Breath  CARDIOVASCULAR: No chest pain, palpitations, passing out, dizziness, or leg swelling  GASTROINTESTINAL: No abdominal or epigastric pain. No nausea, vomiting, or hematemesis; No diarrhea or constipation. No melena or hematochezia.  GENITOURINARY: No dysuria, frequency, hematuria, or incontinence  NEUROLOGICAL: No headaches, memory loss, loss of strength, numbness, or tremors  SKIN: No itching, burning, rashes, or lesions   LYMPH Nodes: No enlarged glands  ENDOCRINE: No heat or cold intolerance; No hair loss  MUSCULOSKELETAL: No joint pain or swelling; No muscle, back, or extremity pain    Medications:  MEDICATIONS  (STANDING):  finasteride 5milliGRAM(s) Oral daily  amiodarone    Tablet 200milliGRAM(s) Oral daily  atorvastatin 20milliGRAM(s) Oral at bedtime  docusate sodium 100milliGRAM(s) Oral daily  midodrine 30milliGRAM(s) Oral every 8 hours  sevelamer hydrochloride 800milliGRAM(s) Oral three times a day with meals  levothyroxine 75MICROGram(s) Oral daily  insulin lispro (HumaLOG) corrective regimen sliding scale  SubCutaneous three times a day before meals  insulin lispro (HumaLOG) corrective regimen sliding scale  SubCutaneous at bedtime  dextrose 5%. 1000milliLiter(s) IV Continuous <Continuous>  dextrose 50% Injectable 12.5Gram(s) IV Push once  dextrose 50% Injectable 25Gram(s) IV Push once  dextrose 50% Injectable 25Gram(s) IV Push once  DOBUTamine Infusion 5.002MICROgram(s)/kG/Min IV Continuous <Continuous>  warfarin 2milliGRAM(s) Oral once    MEDICATIONS  (PRN):  dextrose Gel 1Dose(s) Oral once PRN Blood Glucose LESS THAN 70 milliGRAM(s)/deciliter  glucagon  Injectable 1milliGRAM(s) IntraMuscular once PRN Glucose LESS THAN 70 milligrams/deciliter  acetaminophen   Tablet 650milliGRAM(s) Oral every 6 hours PRN For Temp greater than 38 C (100.4 F)    	    PHYSICAL EXAM:  T(C): 36.5, Max: 38.1 (05-20 @ 15:27)  HR: 78 (78 - 89)  BP: 100/63 (85/59 - 119/69)  RR: 18 (17 - 19)  SpO2: 100% (90% - 100%)  Wt(kg): --  I&O's Summary    I & Os for current day (as of 21 May 2017 12:44)  =============================================  IN: 400 ml / OUT: 2900 ml / NET: -2500 ml      Appearance: Normal	  HEENT:   Normal oral mucosa, PERRL, EOMI	  Lymphatic: No lymphadenopathy  Cardiovascular: Normal S1 S2, No JVD, No murmurs, No edema  Respiratory: Lungs clear to auscultation	  Psychiatry: A & O x 3, Mood & affect appropriate  Gastrointestinal:  Soft, Non-tender, + BS	  Skin: No rashes, No ecchymoses, No cyanosis	  Neurologic: Non-focal  Extremities: Normal range of motion, No clubbing, cyanosis or edema  Vascular: Peripheral pulses palpable 2+ bilaterally    TELEMETRY: 	    ECG:  	  RADIOLOGY:  OTHER: 	  	  LABS:	 	    CARDIAC MARKERS:                                10.2   8.69  )-----------( 145      ( 21 May 2017 09:10 )             33.8     05-21    134<L>  |  97<L>  |  25<H>  ----------------------------<  132<H>  4.8   |  24  |  5.23<H>    Ca    9.0      21 May 2017 09:10  Phos  3.4     05-20  Mg     2.1     05-21      proBNP:   Lipid Profile:   HgA1c:   TSH:

## 2017-05-21 NOTE — PROGRESS NOTE ADULT - SUBJECTIVE AND OBJECTIVE BOX
Subjective and Objective:   Patient seen and examined bedside    No overnight events, sob persists, stable. c/o constipation. tolerated HD well yesterday.    Allergies: No Known Allergies    Hospital Medications:   MEDICATIONS  (STANDING):  finasteride 5milliGRAM(s) Oral daily  amiodarone    Tablet 200milliGRAM(s) Oral daily  atorvastatin 20milliGRAM(s) Oral at bedtime  docusate sodium 100milliGRAM(s) Oral daily  midodrine 30milliGRAM(s) Oral every 8 hours  sevelamer hydrochloride 800milliGRAM(s) Oral three times a day with meals  levothyroxine 75MICROGram(s) Oral daily  insulin lispro (HumaLOG) corrective regimen sliding scale  SubCutaneous three times a day before meals  insulin lispro (HumaLOG) corrective regimen sliding scale  SubCutaneous at bedtime  dextrose 5%. 1000milliLiter(s) IV Continuous <Continuous>  dextrose 50% Injectable 12.5Gram(s) IV Push once  dextrose 50% Injectable 25Gram(s) IV Push once  dextrose 50% Injectable 25Gram(s) IV Push once  DOBUTamine Infusion 5.002MICROgram(s)/kG/Min IV Continuous <Continuous>  epoetin alba Injectable 2000Unit(s) IV Push <User Schedule>      REVIEW OF SYSTEMS:  CONSTITUTIONAL: No weakness, fevers or chills  EYES/ENT: No visual changes;  No vertigo or throat pain   NECK: No pain or stiffness  RESPIRATORY: No cough, wheezing, hemoptysis; No shortness of breath  CARDIOVASCULAR: No chest pain or palpitations.  GASTROINTESTINAL: No abdominal or epigastric pain. No nausea, vomiting, or hematemesis; No diarrhea or constipation. No melena or hematochezia.  GENITOURINARY: No dysuria, frequency, foamy urine, urinary urgency, incontinence or hematuria  NEUROLOGICAL: No numbness or weakness  SKIN: No itching, burning, rashes, or lesions   VASCULAR: No bilateral lower extremity edema.   All other review of systems is negative unless indicated above.    VITALS:  T(F): 97.7, Max: 99 (05-21 @ 05:14)  HR: 78  BP: 100/63  RR: 18  SpO2: 100%  Wt(kg): --    I & Os for current day (as of 05-21 @ 18:30)  =============================================  IN: 400 ml / OUT: 2900 ml / NET: -2500 ml      PHYSICAL EXAM:  Constitutional: NAD  HEENT: anicteric sclera, oropharynx clear  Neck: No JVD  Respiratory: Bibasilar crackles, no wheezes, rales or rhonchi  Cardiovascular: S1, S2, RRR  Gastrointestinal: BS+, soft, NT, obese  Extremities: No cyanosis or clubbing. trace peripheral edema b/l  Neurological: A/O x 3, no focal deficits  Psychiatric: Normal mood, normal affect  : No CVA tenderness. No rosa.   Skin: No rashes  Vascular Access: RIJ Tunneled cath     LABS:  05-21    134<L>  |  97<L>  |  25<H>  ----------------------------<  132<H>  4.8   |  24  |  5.23<H>    Ca    9.0      21 May 2017 09:10  Phos  3.4     05-20  Mg     2.1     05-21                          10.2   8.69  )-----------( 145      ( 21 May 2017 09:10 )             33.8     Urine Studies:      RADIOLOGY & ADDITIONAL STUDIES:

## 2017-05-21 NOTE — PROGRESS NOTE ADULT - PROBLEM SELECTOR PLAN 1
outpt schedule MWF. Pt had HD yesterday, net UF 2.5 kg removed. remains hypervolemic. next HD tomorrow w/2k bath, uf 3-3.5kg as tolearted, add epogen 2k w/hd   Electrolytes acceptable. c/w renal diet, fluid restriction. d/w pt  f/u w/pall care to discuss GOC, in setting of severe CM and ESRD with frequent volume overload and hypotension.

## 2017-05-21 NOTE — PROGRESS NOTE ADULT - PROBLEM SELECTOR PLAN 2
On Dobutamine drip, titration as per CHF service.   Improved hemodynamics during HD. Cont midodrine preHD.

## 2017-05-22 LAB
APTT BLD: 51.8 SEC — HIGH (ref 27.5–37.4)
BUN SERPL-MCNC: 31 MG/DL — HIGH (ref 7–23)
CALCIUM SERPL-MCNC: 9.3 MG/DL — SIGNIFICANT CHANGE UP (ref 8.4–10.5)
CHLORIDE SERPL-SCNC: 93 MMOL/L — LOW (ref 98–107)
CO2 SERPL-SCNC: 24 MMOL/L — SIGNIFICANT CHANGE UP (ref 22–31)
CREAT SERPL-MCNC: 6.09 MG/DL — HIGH (ref 0.5–1.3)
GLUCOSE SERPL-MCNC: 82 MG/DL — SIGNIFICANT CHANGE UP (ref 70–99)
HCT VFR BLD CALC: 35.4 % — LOW (ref 39–50)
HGB BLD-MCNC: 10.5 G/DL — LOW (ref 13–17)
INR BLD: 3.05 — HIGH (ref 0.88–1.17)
MAGNESIUM SERPL-MCNC: 2.2 MG/DL — SIGNIFICANT CHANGE UP (ref 1.6–2.6)
MCHC RBC-ENTMCNC: 27.7 PG — SIGNIFICANT CHANGE UP (ref 27–34)
MCHC RBC-ENTMCNC: 29.7 % — LOW (ref 32–36)
MCV RBC AUTO: 93.4 FL — SIGNIFICANT CHANGE UP (ref 80–100)
PLATELET # BLD AUTO: 148 K/UL — LOW (ref 150–400)
PMV BLD: 11.3 FL — SIGNIFICANT CHANGE UP (ref 7–13)
POTASSIUM SERPL-MCNC: 4.9 MMOL/L — SIGNIFICANT CHANGE UP (ref 3.5–5.3)
POTASSIUM SERPL-SCNC: 4.9 MMOL/L — SIGNIFICANT CHANGE UP (ref 3.5–5.3)
PROTHROM AB SERPL-ACNC: 34.9 SEC — HIGH (ref 9.8–13.1)
RBC # BLD: 3.79 M/UL — LOW (ref 4.2–5.8)
RBC # FLD: 17.5 % — HIGH (ref 10.3–14.5)
SODIUM SERPL-SCNC: 133 MMOL/L — LOW (ref 135–145)
WBC # BLD: 5.76 K/UL — SIGNIFICANT CHANGE UP (ref 3.8–10.5)
WBC # FLD AUTO: 5.76 K/UL — SIGNIFICANT CHANGE UP (ref 3.8–10.5)

## 2017-05-22 PROCEDURE — 93010 ELECTROCARDIOGRAM REPORT: CPT

## 2017-05-22 PROCEDURE — 71010: CPT | Mod: 26

## 2017-05-22 RX ORDER — WARFARIN SODIUM 2.5 MG/1
0.5 TABLET ORAL ONCE
Qty: 0 | Refills: 0 | Status: COMPLETED | OUTPATIENT
Start: 2017-05-22 | End: 2017-05-22

## 2017-05-22 RX ADMIN — SEVELAMER CARBONATE 800 MILLIGRAM(S): 2400 POWDER, FOR SUSPENSION ORAL at 08:50

## 2017-05-22 RX ADMIN — WARFARIN SODIUM 0.5 MILLIGRAM(S): 2.5 TABLET ORAL at 18:39

## 2017-05-22 RX ADMIN — Medication 14.9 MICROGRAM(S)/KG/MIN: at 08:00

## 2017-05-22 RX ADMIN — MIDODRINE HYDROCHLORIDE 30 MILLIGRAM(S): 2.5 TABLET ORAL at 14:41

## 2017-05-22 RX ADMIN — MIDODRINE HYDROCHLORIDE 30 MILLIGRAM(S): 2.5 TABLET ORAL at 05:36

## 2017-05-22 RX ADMIN — Medication 650 MILLIGRAM(S): at 23:32

## 2017-05-22 RX ADMIN — SEVELAMER CARBONATE 800 MILLIGRAM(S): 2400 POWDER, FOR SUSPENSION ORAL at 18:38

## 2017-05-22 RX ADMIN — Medication 100 MILLIGRAM(S): at 11:51

## 2017-05-22 RX ADMIN — ATORVASTATIN CALCIUM 20 MILLIGRAM(S): 80 TABLET, FILM COATED ORAL at 23:32

## 2017-05-22 RX ADMIN — Medication 75 MICROGRAM(S): at 05:36

## 2017-05-22 RX ADMIN — MIDODRINE HYDROCHLORIDE 30 MILLIGRAM(S): 2.5 TABLET ORAL at 23:32

## 2017-05-22 RX ADMIN — AMIODARONE HYDROCHLORIDE 200 MILLIGRAM(S): 400 TABLET ORAL at 05:36

## 2017-05-22 RX ADMIN — ERYTHROPOIETIN 2000 UNIT(S): 10000 INJECTION, SOLUTION INTRAVENOUS; SUBCUTANEOUS at 14:41

## 2017-05-22 RX ADMIN — Medication 650 MILLIGRAM(S): at 15:12

## 2017-05-22 RX ADMIN — SEVELAMER CARBONATE 800 MILLIGRAM(S): 2400 POWDER, FOR SUSPENSION ORAL at 11:51

## 2017-05-22 NOTE — PROGRESS NOTE ADULT - PROBLEM SELECTOR PLAN 1
Continue with dobutamine at 5mcg/kg/min. Pt is dependent on dobutamine,   -will be continuing dobutamine gtt at  home upon discharge.  -Continue fluid removal with HD  -Family meeting on Friday, goals of care are currently being discussed

## 2017-05-22 NOTE — PROGRESS NOTE ADULT - SUBJECTIVE AND OBJECTIVE BOX
Date of Admission:  5/9/17    24H hour events:   No overnight events    Vital Signs Last 24 Hrs:  T(C): 36.8, Max: 36.8 (05-22 @ 10:45)  T(F): 98.3, Max: 98.3 (05-22 @ 10:45)  HR: 90 (90 - 96)  BP: 98/60 (98/60 - 112/52)  BP(mean): --  RR: 17 (17 - 18)  SpO2: 94% (94% - 100%)  I&O's Summary      MEDICATIONS:  DOBUTamine Infusion 5.002MICROgram(s)/kG/Min IV Continuous <Continuous>  amiodarone    Tablet 200milliGRAM(s) Oral daily  midodrine 30milliGRAM(s) Oral every 8 hours  warfarin 0.5milliGRAM(s) Oral once  acetaminophen   Tablet 650milliGRAM(s) Oral every 6 hours PRN  docusate sodium 100milliGRAM(s) Oral daily  finasteride 5milliGRAM(s) Oral daily  atorvastatin 20milliGRAM(s) Oral at bedtime  levothyroxine 75MICROGram(s) Oral daily  insulin lispro (HumaLOG) corrective regimen sliding scale  SubCutaneous three times a day before meals  insulin lispro (HumaLOG) corrective regimen sliding scale  SubCutaneous at bedtime  dextrose Gel 1Dose(s) Oral once PRN  dextrose 50% Injectable 12.5Gram(s) IV Push once  dextrose 50% Injectable 25Gram(s) IV Push once  dextrose 50% Injectable 25Gram(s) IV Push once  glucagon  Injectable 1milliGRAM(s) IntraMuscular once PRN  dextrose 5%. 1000milliLiter(s) IV Continuous <Continuous>  epoetin alba Injectable 2000Unit(s) IV Push <User Schedule>        REVIEW OF SYSTEMS:  CONSTITUTIONAL: No weakness, fevers or chills  EYES/ENT: No visual changes;  No vertigo or throat pain   NECK: No pain or stiffness  RESPIRATORY: No cough, wheezing, hemoptysis; No shortness of breath  CARDIOVASCULAR: No chest pain or palpitations.  GASTROINTESTINAL: No abdominal or epigastric pain. No nausea, vomiting, or hematemesis; No diarrhea or constipation. No melena or hematochezia.  GENITOURINARY: No dysuria, frequency, foamy urine, urinary urgency, incontinence or hematuria    Physical Exam:  GENERAL:  NAD  NECK:  Moderate JVD    LUNGS: bibasilar crackles  HEART:  S1/S2 RRR   ABDOMEN:  soft, nontender, nondistended, positive bowel sounds    EXTREMITIES: 1+ LE edema   SKIN: warm, dry  PSYCH: A&O x 3  VASCULAR: No bilateral lower extremity edema.   All other review of systems is negative unless indicated above.      LABS:	 	  05-22    133<L>  |  93<L>  |  31<H>  ----------------------------<  82  4.9   |  24  |  6.09<H>  05-21    134<L>  |  97<L>  |  25<H>  ----------------------------<  132<H>  4.8   |  24  |  5.23<H>    Ca    9.3      22 May 2017 06:50  Ca    9.0      21 May 2017 09:10  Mg     2.2     05-22  Mg     2.1     05-21                            10.5   5.76  )-----------( 148      ( 22 May 2017 06:50 )             35.4 Date of Admission:  5/9/17    24H hour events:   No overnight events    Vital Signs Last 24 Hrs:  T(C): 36.8, Max: 36.8 (05-22 @ 10:45)  T(F): 98.3, Max: 98.3 (05-22 @ 10:45)  HR: 90 (90 - 96)  BP: 98/60 (98/60 - 112/52)  BP(mean): --  RR: 17 (17 - 18)  SpO2: 94% (94% - 100%)  I&O's Summary      MEDICATIONS:  DOBUTamine Infusion 5.002MICROgram(s)/kG/Min IV Continuous <Continuous>  amiodarone    Tablet 200milliGRAM(s) Oral daily  midodrine 30milliGRAM(s) Oral every 8 hours  warfarin 0.5milliGRAM(s) Oral once  acetaminophen   Tablet 650milliGRAM(s) Oral every 6 hours PRN  docusate sodium 100milliGRAM(s) Oral daily  finasteride 5milliGRAM(s) Oral daily  atorvastatin 20milliGRAM(s) Oral at bedtime  levothyroxine 75MICROGram(s) Oral daily  insulin lispro (HumaLOG) corrective regimen sliding scale  SubCutaneous three times a day before meals  insulin lispro (HumaLOG) corrective regimen sliding scale  SubCutaneous at bedtime  dextrose Gel 1Dose(s) Oral once PRN  dextrose 50% Injectable 12.5Gram(s) IV Push once  dextrose 50% Injectable 25Gram(s) IV Push once  dextrose 50% Injectable 25Gram(s) IV Push once  glucagon  Injectable 1milliGRAM(s) IntraMuscular once PRN  dextrose 5%. 1000milliLiter(s) IV Continuous <Continuous>  epoetin alba Injectable 2000Unit(s) IV Push <User Schedule>        REVIEW OF SYSTEMS:  CONSTITUTIONAL: No weakness, fevers or chills  EYES/ENT: No visual changes;  No vertigo or throat pain   NECK: No pain or stiffness  RESPIRATORY: No cough, wheezing, hemoptysis; No shortness of breath  CARDIOVASCULAR: No chest pain or palpitations.  GASTROINTESTINAL: No abdominal or epigastric pain. No nausea, vomiting, or hematemesis; No diarrhea or constipation. No melena or hematochezia.  GENITOURINARY: No dysuria, frequency, foamy urine, urinary urgency, incontinence or hematuria    Physical Exam:  GENERAL:  NAD  NECK:  +, slight JVD    LUNGS: slight crackles at bases  HEART:  S1/S2 RRR   ABDOMEN:  soft, nontender, nondistended, positive bowel sounds    EXTREMITIES: +PVD, edema improved, +1  SKIN: warm, dry  PSYCH: A&O x 3  VASCULAR: No bilateral lower extremity edema.   All other review of systems is negative unless indicated above.      LABS:	 	  05-22    133<L>  |  93<L>  |  31<H>  ----------------------------<  82  4.9   |  24  |  6.09<H>  05-21    134<L>  |  97<L>  |  25<H>  ----------------------------<  132<H>  4.8   |  24  |  5.23<H>    Ca    9.3      22 May 2017 06:50  Ca    9.0      21 May 2017 09:10  Mg     2.2     05-22  Mg     2.1     05-21                            10.5   5.76  )-----------( 148      ( 22 May 2017 06:50 )             35.4

## 2017-05-22 NOTE — PROGRESS NOTE ADULT - ATTENDING COMMENTS
Patient seen immediately after dialysis 2.4 litres. More SOB satting 80 on 9L/min.  today 94Kg post dialysis (97 yesterday, 99 on admission)  Na 133, K 4.9, CL 93, Co2 24, BUN 31, Cr 6., INR 3.0   Exam: in distress 88/, lungs decreased air entry no obvious significant volume overload.   Cause for deteriorating respiratory status is unclear. Not consistent with volume overload since deteriorated after dialysis.   Suggest full work up including EKG, CXR, possible blood gas, PE unlikely with INR  3.0  Confirm DNR status.  Randell Stovall

## 2017-05-22 NOTE — PROGRESS NOTE ADULT - SUBJECTIVE AND OBJECTIVE BOX
Patient seen and examined bedside on hd    Subjective and Objective: No overnight events, sob better, stable. c/o constipation.     Allergies: No Known Allergies    Hospital Medications:   MEDICATIONS  (STANDING):  finasteride 5milliGRAM(s) Oral daily  amiodarone    Tablet 200milliGRAM(s) Oral daily  atorvastatin 20milliGRAM(s) Oral at bedtime  docusate sodium 100milliGRAM(s) Oral daily  midodrine 30milliGRAM(s) Oral every 8 hours  sevelamer hydrochloride 800milliGRAM(s) Oral three times a day with meals  levothyroxine 75MICROGram(s) Oral daily  insulin lispro (HumaLOG) corrective regimen sliding scale  SubCutaneous three times a day before meals  insulin lispro (HumaLOG) corrective regimen sliding scale  SubCutaneous at bedtime  dextrose 5%. 1000milliLiter(s) IV Continuous <Continuous>  dextrose 50% Injectable 12.5Gram(s) IV Push once  dextrose 50% Injectable 25Gram(s) IV Push once  dextrose 50% Injectable 25Gram(s) IV Push once  DOBUTamine Infusion 5.002MICROgram(s)/kG/Min IV Continuous <Continuous>  epoetin alba Injectable 2000Unit(s) IV Push <User Schedule>      REVIEW OF SYSTEMS:  CONSTITUTIONAL: No weakness, fevers or chills  EYES/ENT: No visual changes;  No vertigo or throat pain   NECK: No pain or stiffness  RESPIRATORY: No cough, wheezing, hemoptysis; +shortness of breath  CARDIOVASCULAR: No chest pain or palpitations.  GASTROINTESTINAL: No abdominal or epigastric pain. No nausea, vomiting, or hematemesis; No diarrhea or constipation. No melena or hematochezia.  GENITOURINARY: No dysuria, frequency, foamy urine, urinary urgency, incontinence or hematuria  NEUROLOGICAL: No numbness or weakness  SKIN: No itching, burning, rashes, or lesions   VASCULAR: No bilateral lower extremity edema.   All other review of systems is negative unless indicated above.    VITALS:  T(F): 97.7, Max: 99 (05-21 @ 05:14)  HR: 78  BP: 100/63  RR: 18  SpO2: 100%  Wt(kg): --    I & Os for current day (as of 05-21 @ 18:30)  =============================================  IN: 400 ml / OUT: 2900 ml / NET: -2500 ml      PHYSICAL EXAM:  Constitutional: NAD  HEENT: anicteric sclera, oropharynx clear  Neck: No JVD  Respiratory: Bibasilar crackles, no wheezes, rales or rhonchi  Cardiovascular: S1, S2, RRR  Gastrointestinal: BS+, soft, NT, obese  Extremities: No cyanosis or clubbing. no peripheral edema b/l  Neurological: A/O x 3, no focal deficits  Psychiatric: Normal mood, normal affect  : No CVA tenderness. No rosa.   Skin: No rashes  Vascular Access: RIJ Tunneled cath     LABS:  05-21    134<L>  |  97<L>  |  25<H>  ----------------------------<  132<H>  4.8   |  24  |  5.23<H>    Ca    9.0      21 May 2017 09:10  Phos  3.4     05-20  Mg     2.1     05-21                          10.2   8.69  )-----------( 145      ( 21 May 2017 09:10 )             33.8     Urine Studies:      RADIOLOGY & ADDITIONAL STUDIES:

## 2017-05-22 NOTE — PROGRESS NOTE ADULT - PROBLEM SELECTOR PLAN 1
outpt schedule MWF. c/w HD w/2k bath, uf 3-3.5kg as tolearted, added epogen 2k w/hd. bp stable.   Electrolytes acceptable. c/w renal diet, fluid restriction. d/w pt  Volume overload -inc uf w/hd as tolerated

## 2017-05-22 NOTE — PROGRESS NOTE ADULT - ASSESSMENT
Patient is a 62y old male w/ PMHX of systolic HF on dobutamine and ESRD on HD. Stable on current dose of dobutamine and is tolerating HD now every other day

## 2017-05-22 NOTE — PROGRESS NOTE ADULT - ASSESSMENT
61 yo M with acute on chronic severe systolic CHF (EF 19%), ESRD, DM2, A fib:  - Acute on chronic systolic CHF - s/p right heart cath, dependent on Dobutamine drip, requiring more frequent HD for fluid removal, prognosis very poor, follow with CHF team  - ESRD - continue HD as per Renal, continue Midodrine, outpatient hd as per renal  - A fib - resumed Coumadin, continue with Amio  - DM2 - controlled, continue with ISS  - Poor prognosis, arranging transportation for 4 weekly HD sessions and home services for discharge planning, will continue to follow with Palliative

## 2017-05-22 NOTE — CHART NOTE - NSCHARTNOTEFT_GEN_A_CORE
called by RN, pt just returned from HD, and is hypotensive. Pt seen and examined.  BP 85/54 , O2 sat 68%,   Heart Failure team at bedside, pt on Dobutamine gtt.  Pt placed on 100% NRB and O2 sat improved to 95%. BP now 91/47, pt feeling somewhat better.  CXR - ordered, will continue to monitor closely and try to wean O2 to venti mask and back to NC.

## 2017-05-22 NOTE — PROGRESS NOTE ADULT - SUBJECTIVE AND OBJECTIVE BOX
CHIEF COMPLAINT:Patient is a 62y old  Male who presents with a chief complaint of sob (09 May 2017 15:32), currently requiring milrinone drip and frequent HD for adequate fluid removal.    	      No Known Allergies      PAST MEDICAL & SURGICAL HISTORY:  Chronic hypotension  AF (atrial fibrillation)  COPD (chronic obstructive pulmonary disease): 4L home O2  HLD (hyperlipidemia)  DM (diabetes mellitus)  ESRD (end stage renal disease) on dialysis  BPH (benign prostatic hypertrophy)  Myocardial infarction: 10/2011  Chronic renal insufficiency  Gout  Dyslipidemia  Diabetes mellitus  CHF (congestive heart failure)  AICD (automatic cardioverter/defibrillator) present: Biot[employer] - placed 9/11/09  H/O coronary angiogram      FAMILY HISTORY:  No pertinent family history in first degree relatives      REVIEW OF SYSTEMS:  CONSTITUTIONAL: No fever, weight loss, or fatigue  EYES: No eye pain, visual disturbances, or discharge  NECK: No pain or stiffness  RESPIRATORY: No cough, wheezing, chills or hemoptysis; No Shortness of Breath  CARDIOVASCULAR: No chest pain, palpitations, passing out, dizziness, or leg swelling  GASTROINTESTINAL: No abdominal or epigastric pain. No nausea, vomiting, or hematemesis; No diarrhea or constipation. No melena or hematochezia.  GENITOURINARY: No dysuria, frequency, hematuria, or incontinence  NEUROLOGICAL: No headaches, memory loss, loss of strength, numbness, or tremors  SKIN: No itching, burning, rashes, or lesions   LYMPH Nodes: No enlarged glands  ENDOCRINE: No heat or cold intolerance; No hair loss  MUSCULOSKELETAL: No joint pain or swelling; No muscle, back, or extremity pain    Medications:  MEDICATIONS  (STANDING):  finasteride 5milliGRAM(s) Oral daily  amiodarone    Tablet 200milliGRAM(s) Oral daily  atorvastatin 20milliGRAM(s) Oral at bedtime  docusate sodium 100milliGRAM(s) Oral daily  midodrine 30milliGRAM(s) Oral every 8 hours  sevelamer hydrochloride 800milliGRAM(s) Oral three times a day with meals  levothyroxine 75MICROGram(s) Oral daily  insulin lispro (HumaLOG) corrective regimen sliding scale  SubCutaneous three times a day before meals  insulin lispro (HumaLOG) corrective regimen sliding scale  SubCutaneous at bedtime  dextrose 5%. 1000milliLiter(s) IV Continuous <Continuous>  dextrose 50% Injectable 12.5Gram(s) IV Push once  dextrose 50% Injectable 25Gram(s) IV Push once  dextrose 50% Injectable 25Gram(s) IV Push once  DOBUTamine Infusion 5.002MICROgram(s)/kG/Min IV Continuous <Continuous>  epoetin alba Injectable 2000Unit(s) IV Push <User Schedule>    MEDICATIONS  (PRN):  dextrose Gel 1Dose(s) Oral once PRN Blood Glucose LESS THAN 70 milliGRAM(s)/deciliter  glucagon  Injectable 1milliGRAM(s) IntraMuscular once PRN Glucose LESS THAN 70 milligrams/deciliter  acetaminophen   Tablet 650milliGRAM(s) Oral every 6 hours PRN For Temp greater than 38 C (100.4 F)    	    PHYSICAL EXAM:  T(C): 36.6, Max: 36.9 (05-22 @ 12:15)  HR: 100 (90 - 103)  BP: 91/47 (85/54 - 112/52)  RR: 20 (17 - 22)  SpO2: 95% (68% - 100%)  Wt(kg): --  I&O's Summary    I & Os for current day (as of 22 May 2017 20:13)  =============================================  IN: 500 ml / OUT: 3200 ml / NET: -2700 ml      Appearance: Normal	  HEENT:   Normal oral mucosa, PERRL, EOMI	  Lymphatic: No lymphadenopathy  Cardiovascular: Normal S1 S2, No JVD, No murmurs, No edema  Respiratory: Lungs clear to auscultation	  Psychiatry: A & O x 3, Mood & affect appropriate  Gastrointestinal:  Soft, Non-tender, + BS	  Skin: No rashes, No ecchymoses, No cyanosis	  Neurologic: Non-focal  Extremities: Normal range of motion, No clubbing, cyanosis or edema  Vascular: Peripheral pulses palpable 2+ bilaterally    TELEMETRY: 	    ECG:  	  RADIOLOGY:  OTHER: 	  	  LABS:	 	    CARDIAC MARKERS:                                10.5   5.76  )-----------( 148      ( 22 May 2017 06:50 )             35.4     05-22    133<L>  |  93<L>  |  31<H>  ----------------------------<  82  4.9   |  24  |  6.09<H>    Ca    9.3      22 May 2017 06:50  Mg     2.2     05-22      proBNP:   Lipid Profile:   HgA1c:   TSH:

## 2017-05-23 DIAGNOSIS — I50.23 ACUTE ON CHRONIC SYSTOLIC (CONGESTIVE) HEART FAILURE: ICD-10-CM

## 2017-05-23 LAB
BASOPHILS # BLD AUTO: 0.02 K/UL — SIGNIFICANT CHANGE UP (ref 0–0.2)
BASOPHILS NFR BLD AUTO: 0.3 % — SIGNIFICANT CHANGE UP (ref 0–2)
BUN SERPL-MCNC: 21 MG/DL — SIGNIFICANT CHANGE UP (ref 7–23)
CALCIUM SERPL-MCNC: 9.1 MG/DL — SIGNIFICANT CHANGE UP (ref 8.4–10.5)
CHLORIDE SERPL-SCNC: 94 MMOL/L — LOW (ref 98–107)
CO2 SERPL-SCNC: 26 MMOL/L — SIGNIFICANT CHANGE UP (ref 22–31)
CREAT SERPL-MCNC: 4.79 MG/DL — HIGH (ref 0.5–1.3)
EOSINOPHIL # BLD AUTO: 0.08 K/UL — SIGNIFICANT CHANGE UP (ref 0–0.5)
EOSINOPHIL NFR BLD AUTO: 1.3 % — SIGNIFICANT CHANGE UP (ref 0–6)
GLUCOSE SERPL-MCNC: 95 MG/DL — SIGNIFICANT CHANGE UP (ref 70–99)
HCT VFR BLD CALC: 36.7 % — LOW (ref 39–50)
HGB BLD-MCNC: 11 G/DL — LOW (ref 13–17)
IMM GRANULOCYTES NFR BLD AUTO: 0.3 % — SIGNIFICANT CHANGE UP (ref 0–1.5)
INR BLD: 3.72 — HIGH (ref 0.88–1.17)
LYMPHOCYTES # BLD AUTO: 0.87 K/UL — LOW (ref 1–3.3)
LYMPHOCYTES # BLD AUTO: 14.3 % — SIGNIFICANT CHANGE UP (ref 13–44)
MAGNESIUM SERPL-MCNC: 2 MG/DL — SIGNIFICANT CHANGE UP (ref 1.6–2.6)
MCHC RBC-ENTMCNC: 28 PG — SIGNIFICANT CHANGE UP (ref 27–34)
MCHC RBC-ENTMCNC: 30 % — LOW (ref 32–36)
MCV RBC AUTO: 93.4 FL — SIGNIFICANT CHANGE UP (ref 80–100)
MONOCYTES # BLD AUTO: 0.96 K/UL — HIGH (ref 0–0.9)
MONOCYTES NFR BLD AUTO: 15.8 % — HIGH (ref 2–14)
NEUTROPHILS # BLD AUTO: 4.12 K/UL — SIGNIFICANT CHANGE UP (ref 1.8–7.4)
NEUTROPHILS NFR BLD AUTO: 68 % — SIGNIFICANT CHANGE UP (ref 43–77)
PLATELET # BLD AUTO: 155 K/UL — SIGNIFICANT CHANGE UP (ref 150–400)
PMV BLD: 12 FL — SIGNIFICANT CHANGE UP (ref 7–13)
POTASSIUM SERPL-MCNC: 4.2 MMOL/L — SIGNIFICANT CHANGE UP (ref 3.5–5.3)
POTASSIUM SERPL-SCNC: 4.2 MMOL/L — SIGNIFICANT CHANGE UP (ref 3.5–5.3)
PROTHROM AB SERPL-ACNC: 42.8 SEC — HIGH (ref 9.8–13.1)
RBC # BLD: 3.93 M/UL — LOW (ref 4.2–5.8)
RBC # FLD: 17.3 % — HIGH (ref 10.3–14.5)
SODIUM SERPL-SCNC: 135 MMOL/L — SIGNIFICANT CHANGE UP (ref 135–145)
WBC # BLD: 6.07 K/UL — SIGNIFICANT CHANGE UP (ref 3.8–10.5)
WBC # FLD AUTO: 6.07 K/UL — SIGNIFICANT CHANGE UP (ref 3.8–10.5)

## 2017-05-23 PROCEDURE — 71010: CPT | Mod: 26

## 2017-05-23 RX ADMIN — FINASTERIDE 5 MILLIGRAM(S): 5 TABLET, FILM COATED ORAL at 13:31

## 2017-05-23 RX ADMIN — SEVELAMER CARBONATE 800 MILLIGRAM(S): 2400 POWDER, FOR SUSPENSION ORAL at 18:25

## 2017-05-23 RX ADMIN — Medication 75 MICROGRAM(S): at 05:41

## 2017-05-23 RX ADMIN — MIDODRINE HYDROCHLORIDE 30 MILLIGRAM(S): 2.5 TABLET ORAL at 13:31

## 2017-05-23 RX ADMIN — SEVELAMER CARBONATE 800 MILLIGRAM(S): 2400 POWDER, FOR SUSPENSION ORAL at 13:31

## 2017-05-23 RX ADMIN — MIDODRINE HYDROCHLORIDE 30 MILLIGRAM(S): 2.5 TABLET ORAL at 05:42

## 2017-05-23 RX ADMIN — AMIODARONE HYDROCHLORIDE 200 MILLIGRAM(S): 400 TABLET ORAL at 05:41

## 2017-05-23 RX ADMIN — SEVELAMER CARBONATE 800 MILLIGRAM(S): 2400 POWDER, FOR SUSPENSION ORAL at 09:13

## 2017-05-23 RX ADMIN — Medication 100 MILLIGRAM(S): at 13:31

## 2017-05-23 NOTE — PROGRESS NOTE ADULT - SUBJECTIVE AND OBJECTIVE BOX
Pt seen and examined at bedside.  Yesterday events noted, episode of sob and hypotension after HD.   Feeling better now. Denies SOB currently. Ate breakfast.     Allergies:  No Known Allergies    Hospital Medications:   MEDICATIONS  (STANDING):  finasteride 5milliGRAM(s) Oral daily  amiodarone    Tablet 200milliGRAM(s) Oral daily  atorvastatin 20milliGRAM(s) Oral at bedtime  docusate sodium 100milliGRAM(s) Oral daily  midodrine 30milliGRAM(s) Oral every 8 hours  sevelamer hydrochloride 800milliGRAM(s) Oral three times a day with meals  levothyroxine 75MICROGram(s) Oral daily  insulin lispro (HumaLOG) corrective regimen sliding scale  SubCutaneous three times a day before meals  insulin lispro (HumaLOG) corrective regimen sliding scale  SubCutaneous at bedtime  dextrose 5%. 1000milliLiter(s) IV Continuous <Continuous>  dextrose 50% Injectable 12.5Gram(s) IV Push once  dextrose 50% Injectable 25Gram(s) IV Push once  dextrose 50% Injectable 25Gram(s) IV Push once  DOBUTamine Infusion 5.002MICROgram(s)/kG/Min IV Continuous <Continuous>  epoetin alba Injectable 2000Unit(s) IV Push <User Schedule>    VITALS:  T(F): 99.9, Max: 100.8 (05-23 @ 00:35)  HR: 87  BP: 98/62  RR: 20  SpO2: 100%  Wt(kg): --    I & Os for current day (as of 05-23 @ 12:51)  =============================================  IN: 500 ml / OUT: 3200 ml / NET: -2700 ml      PHYSICAL EXAM:  Constitutional: NAD  HEENT: anicteric sclera, oropharynx clear  Neck: No JVD  Respiratory: Minimal bibasilar crackles, no wheezes, rales or rhonchi  Cardiovascular: S1, S2, RRR  Gastrointestinal: BS+, soft, NT, obese  Extremities: No cyanosis or clubbing. no peripheral edema b/l  Neurological: A/O x 3, no focal deficits  Psychiatric: Normal mood, normal affect  : No CVA tenderness. No rosa.   Skin: No rashes  Vascular Access: [facility] Tunneled cath     LABS:  05-23    135  |  94<L>  |  21  ----------------------------<  95  4.2   |  26  |  4.79<H>    Ca    9.1      23 May 2017 06:55  Mg     2.0     05-23      Creatinine Trend: 4.79 <--, 6.09 <--, 5.23 <--, 5.45 <--, 5.17 <--, 6.09 <--, 5.02 <--                        11.0   6.07  )-----------( 155      ( 23 May 2017 06:55 )             36.7

## 2017-05-23 NOTE — PROGRESS NOTE ADULT - SUBJECTIVE AND OBJECTIVE BOX
CHIEF COMPLAINT:Patient is a 62y old  Male who presents with a chief complaint of sob (09 May 2017 15:32)    	      No Known Allergies      PAST MEDICAL & SURGICAL HISTORY:  Chronic hypotension  AF (atrial fibrillation)  COPD (chronic obstructive pulmonary disease): 4L home O2  HLD (hyperlipidemia)  DM (diabetes mellitus)  ESRD (end stage renal disease) on dialysis  BPH (benign prostatic hypertrophy)  Myocardial infarction: 10/2011  Chronic renal insufficiency  Gout  Dyslipidemia  Diabetes mellitus  CHF (congestive heart failure)  AICD (automatic cardioverter/defibrillator) present: Biot[employer] - placed 9/11/09  H/O coronary angiogram      FAMILY HISTORY:  No pertinent family history in first degree relatives      REVIEW OF SYSTEMS:  CONSTITUTIONAL: No fever, weight loss, or fatigue  EYES: No eye pain, visual disturbances, or discharge  NECK: No pain or stiffness  RESPIRATORY: No cough, wheezing, chills or hemoptysis; SOB more than usual  CARDIOVASCULAR: No chest pain, palpitations, passing out, dizziness, or leg swelling  GASTROINTESTINAL: No abdominal or epigastric pain. No nausea, vomiting, or hematemesis; No diarrhea or constipation. No melena or hematochezia.  GENITOURINARY: No dysuria, frequency, hematuria, or incontinence  NEUROLOGICAL: No headaches, memory loss, loss of strength, numbness, or tremors  SKIN: No itching, burning, rashes, or lesions   LYMPH Nodes: No enlarged glands  ENDOCRINE: No heat or cold intolerance; No hair loss  MUSCULOSKELETAL: No joint pain or swelling; No muscle, back, or extremity pain    Medications:  MEDICATIONS  (STANDING):  finasteride 5milliGRAM(s) Oral daily  amiodarone    Tablet 200milliGRAM(s) Oral daily  atorvastatin 20milliGRAM(s) Oral at bedtime  docusate sodium 100milliGRAM(s) Oral daily  midodrine 30milliGRAM(s) Oral every 8 hours  sevelamer hydrochloride 800milliGRAM(s) Oral three times a day with meals  levothyroxine 75MICROGram(s) Oral daily  insulin lispro (HumaLOG) corrective regimen sliding scale  SubCutaneous three times a day before meals  insulin lispro (HumaLOG) corrective regimen sliding scale  SubCutaneous at bedtime  dextrose 5%. 1000milliLiter(s) IV Continuous <Continuous>  dextrose 50% Injectable 12.5Gram(s) IV Push once  dextrose 50% Injectable 25Gram(s) IV Push once  dextrose 50% Injectable 25Gram(s) IV Push once  DOBUTamine Infusion 5.002MICROgram(s)/kG/Min IV Continuous <Continuous>  epoetin alba Injectable 2000Unit(s) IV Push <User Schedule>    MEDICATIONS  (PRN):  dextrose Gel 1Dose(s) Oral once PRN Blood Glucose LESS THAN 70 milliGRAM(s)/deciliter  glucagon  Injectable 1milliGRAM(s) IntraMuscular once PRN Glucose LESS THAN 70 milligrams/deciliter  acetaminophen   Tablet 650milliGRAM(s) Oral every 6 hours PRN For Temp greater than 38 C (100.4 F)    	    PHYSICAL EXAM:  T(C): 37.7, Max: 38.2 (05-23 @ 00:35)  HR: 87 (85 - 103)  BP: 98/62 (85/54 - 110/50)  RR: 20 (18 - 22)  SpO2: 100% (68% - 100%)  Wt(kg): --  I&O's Summary    I & Os for current day (as of 23 May 2017 16:24)  =============================================  IN: 500 ml / OUT: 3200 ml / NET: -2700 ml      Appearance: Normal	  HEENT:   Normal oral mucosa, PERRL, EOMI	  Lymphatic: No lymphadenopathy  Cardiovascular: Normal S1 S2, No JVD, No murmurs, No edema  Respiratory: Lungs clear to auscultation	  Psychiatry: A & O x 3, Mood & affect appropriate  Gastrointestinal:  Soft, Non-tender, + BS	  Skin: No rashes, No ecchymoses, No cyanosis	  Neurologic: Non-focal  Extremities: Normal range of motion, No clubbing, cyanosis or edema  Vascular: Peripheral pulses palpable 2+ bilaterally                            11.0   6.07  )-----------( 155      ( 23 May 2017 06:55 )             36.7     05-23    135  |  94<L>  |  21  ----------------------------<  95  4.2   |  26  |  4.79<H>    Ca    9.1      23 May 2017 06:55  Mg     2.0     05-23      proBNP:   Lipid Profile:   HgA1c:   TSH:

## 2017-05-23 NOTE — PROGRESS NOTE ADULT - ASSESSMENT
Patient is a 62y old  Male who presents with a chief complaint of sob (09 May 2017 15:32). PMHx of severe systolic dysfunction on Dobutamine and ESRD on chronic HD. Yesterday he had episode of sob. Today he is feeling much better, no acute SOB, orthopnea, PND, CP, palpitations

## 2017-05-23 NOTE — PROGRESS NOTE ADULT - PROBLEM SELECTOR PLAN 2
On Dobutamine drip, titration as per CHF service.   Improved hemodynamics during HD. Cont midodrine preHD. On Dobutamine drip, titration as per CHF service.   Overall improved hemodynamics during HD. Cont midodrine.

## 2017-05-23 NOTE — PROGRESS NOTE ADULT - PROBLEM SELECTOR PLAN 1
Outpt hd schedule MWF.   Electrolytes acceptable. c/w renal diet, fluid restriction. d/w pt  Volume overload -inc uf w/hd as tolerated Outpt hd schedule MWF.   Electrolytes acceptable.   HD yesterday c/b hypotension post treatment. UF 2.7kg achieved.   c/w renal diet, fluid restriction.   Plan for repeat HD tomorrow. No isolated UF today.

## 2017-05-23 NOTE — PROGRESS NOTE ADULT - ASSESSMENT
63 yo M with acute on chronic severe systolic CHF (EF 19%), ESRD, DM2, A fib:  - Progressive SOB - pt reports improvement with nonrebreather, but still states he is SOB more than baseline, continue O2 support, titrate as tolerated, check CXR to assess for possible consolidation and for volume status  - Acute on chronic systolic CHF - s/p right heart cath, dependent on Dobutamine drip, requiring more frequent HD for fluid removal, prognosis very poor, follow with CHF team  - ESRD - continue HD as per Renal, continue Midodrine, outpatient hd as per renal  - A fib - resumed Coumadin, continue with Amio  - DM2 - controlled, continue with ISS  - Poor prognosis, arranging transportation for 4 weekly HD sessions and home services for discharge planning, will continue to follow with Palliative

## 2017-05-23 NOTE — PROGRESS NOTE ADULT - SUBJECTIVE AND OBJECTIVE BOX
Patient is a 62y old  Male who presents with a chief complaint of sob (09 May 2017 15:32). PMHx of severe systolic dysfunction on Dobutamine and ESRD on chronic HD. Yesterday he had episode of sob. Today he is feeling much better, no acute SOB, orthopnea, PND, CP, palpitations       INTERVAL HPI/OVERNIGHT EVENTS:    MEDICATIONS  (STANDING):  finasteride 5milliGRAM(s) Oral daily  amiodarone    Tablet 200milliGRAM(s) Oral daily  atorvastatin 20milliGRAM(s) Oral at bedtime  docusate sodium 100milliGRAM(s) Oral daily  midodrine 30milliGRAM(s) Oral every 8 hours  sevelamer hydrochloride 800milliGRAM(s) Oral three times a day with meals  levothyroxine 75MICROGram(s) Oral daily  insulin lispro (HumaLOG) corrective regimen sliding scale  SubCutaneous three times a day before meals  insulin lispro (HumaLOG) corrective regimen sliding scale  SubCutaneous at bedtime  dextrose 5%. 1000milliLiter(s) IV Continuous <Continuous>  dextrose 50% Injectable 12.5Gram(s) IV Push once  dextrose 50% Injectable 25Gram(s) IV Push once  dextrose 50% Injectable 25Gram(s) IV Push once  DOBUTamine Infusion 5.002MICROgram(s)/kG/Min IV Continuous <Continuous>  epoetin alba Injectable 2000Unit(s) IV Push <User Schedule>    MEDICATIONS  (PRN):  dextrose Gel 1Dose(s) Oral once PRN Blood Glucose LESS THAN 70 milliGRAM(s)/deciliter  glucagon  Injectable 1milliGRAM(s) IntraMuscular once PRN Glucose LESS THAN 70 milligrams/deciliter  acetaminophen   Tablet 650milliGRAM(s) Oral every 6 hours PRN For Temp greater than 38 C (100.4 F)      Allergies    No Known Allergies    Intolerances        Height (cm): 180.3 (05-09 @ 10:29)  Weight (kg): 99.3 (05-09 @ 10:29)  BMI (kg/m2): 30.5 (05-09 @ 10:29)  BSA (m2): 2.19 (05-09 @ 10:29)  Vital Signs Last 24 Hrs  T(C): 37.7, Max: 38.2 (05-23 @ 00:35)  T(F): 99.9, Max: 100.8 (05-23 @ 00:35)  HR: 87 (85 - 103)  BP: 98/62 (85/54 - 111/49)  BP(mean): --  RR: 20 (18 - 22)  SpO2: 100% (68% - 100%)  [ ] room air   [ ] 02    PHYSICAL EXAM:  GENERAL:  No acute distress, well appearing, [ ] Agitated, [ ] Lethargy, [ ] confused   HEAD:  normal  ENMT: normal  NECK:  large JVP  NERVOUS SYSTEM:  Alert & Oriented X3, no focal deficits [ ]Confusion  [ ] Encephalopathic [ ] Sedated [ ] Other  CHEST/LUNG:: Mild crackles b/l  [ ] decreased breath sounds at bases  [ ] wheezing   [ ] rhonchi  [ ] crackles  HEART:  Regular rate and rhythm, No murmurs, rubs, or gallops,  [ ] irregular   ABDOMEN:  soft, nontender, nondistended, positive bowel sounds   [ ] obese  EXTREMITIES: 2+ LE edema b/l   SKIN: [ ] venous stasis skin changes    LABS:                        11.0   6.07  )-----------( 155      ( 23 May 2017 06:55 )             36.7     23 May 2017 06:55    135    |  94     |  21     ----------------------------<  95     4.2     |  26     |  4.79     Ca    9.1        23 May 2017 06:55  Mg     2.0       23 May 2017 06:55      PT/INR - ( 23 May 2017 06:55 )   PT: 42.8 SEC;   INR: 3.72              CAPILLARY BLOOD GLUCOSE  126 (23 May 2017 11:55)  81 (23 May 2017 08:21)  123 (22 May 2017 21:29)  111 (22 May 2017 16:43)  121 (22 May 2017 14:42)    Cultures

## 2017-05-24 LAB
BASE EXCESS BLDA CALC-SCNC: 3.8 MMOL/L — SIGNIFICANT CHANGE UP
BASOPHILS # BLD AUTO: 0.01 K/UL — SIGNIFICANT CHANGE UP (ref 0–0.2)
BASOPHILS NFR BLD AUTO: 0.1 % — SIGNIFICANT CHANGE UP (ref 0–2)
BUN SERPL-MCNC: 28 MG/DL — HIGH (ref 7–23)
CALCIUM SERPL-MCNC: 8.7 MG/DL — SIGNIFICANT CHANGE UP (ref 8.4–10.5)
CHLORIDE BLDA-SCNC: 103 MMOL/L — SIGNIFICANT CHANGE UP (ref 96–108)
CHLORIDE SERPL-SCNC: 93 MMOL/L — LOW (ref 98–107)
CO2 SERPL-SCNC: 24 MMOL/L — SIGNIFICANT CHANGE UP (ref 22–31)
CREAT BLDA-MCNC: 3.89 MG/DL — HIGH (ref 0.5–1.3)
CREAT SERPL-MCNC: 5.94 MG/DL — HIGH (ref 0.5–1.3)
EOSINOPHIL # BLD AUTO: 0.07 K/UL — SIGNIFICANT CHANGE UP (ref 0–0.5)
EOSINOPHIL NFR BLD AUTO: 0.8 % — SIGNIFICANT CHANGE UP (ref 0–6)
GLUCOSE BLDA-MCNC: 100 MG/DL — HIGH (ref 70–99)
GLUCOSE SERPL-MCNC: 106 MG/DL — HIGH (ref 70–99)
HCO3 BLDA-SCNC: 28 MMOL/L — HIGH (ref 22–26)
HCT VFR BLD CALC: 33 % — LOW (ref 39–50)
HCT VFR BLDA CALC: 32.6 % — LOW (ref 39–51)
HGB BLD-MCNC: 10.1 G/DL — LOW (ref 13–17)
HGB BLDA-MCNC: 10.6 G/DL — LOW (ref 13–17)
IMM GRANULOCYTES NFR BLD AUTO: 0.1 % — SIGNIFICANT CHANGE UP (ref 0–1.5)
INR BLD: 4.11 — HIGH (ref 0.88–1.17)
LACTATE BLDA-SCNC: 1.5 MMOL/L — SIGNIFICANT CHANGE UP (ref 0.5–2)
LYMPHOCYTES # BLD AUTO: 1.58 K/UL — SIGNIFICANT CHANGE UP (ref 1–3.3)
LYMPHOCYTES # BLD AUTO: 19.1 % — SIGNIFICANT CHANGE UP (ref 13–44)
MAGNESIUM SERPL-MCNC: 1.9 MG/DL — SIGNIFICANT CHANGE UP (ref 1.6–2.6)
MCHC RBC-ENTMCNC: 28.2 PG — SIGNIFICANT CHANGE UP (ref 27–34)
MCHC RBC-ENTMCNC: 30.6 % — LOW (ref 32–36)
MCV RBC AUTO: 92.2 FL — SIGNIFICANT CHANGE UP (ref 80–100)
MONOCYTES # BLD AUTO: 1.02 K/UL — HIGH (ref 0–0.9)
MONOCYTES NFR BLD AUTO: 12.3 % — SIGNIFICANT CHANGE UP (ref 2–14)
NEUTROPHILS # BLD AUTO: 5.58 K/UL — SIGNIFICANT CHANGE UP (ref 1.8–7.4)
NEUTROPHILS NFR BLD AUTO: 67.6 % — SIGNIFICANT CHANGE UP (ref 43–77)
PCO2 BLDA: 37 MMHG — SIGNIFICANT CHANGE UP (ref 35–48)
PH BLDA: 7.48 PH — HIGH (ref 7.35–7.45)
PLATELET # BLD AUTO: 156 K/UL — SIGNIFICANT CHANGE UP (ref 150–400)
PMV BLD: 11.4 FL — SIGNIFICANT CHANGE UP (ref 7–13)
PO2 BLDA: 95 MMHG — SIGNIFICANT CHANGE UP (ref 83–108)
POTASSIUM BLDA-SCNC: 3.2 MMOL/L — LOW (ref 3.4–4.5)
POTASSIUM SERPL-MCNC: 4 MMOL/L — SIGNIFICANT CHANGE UP (ref 3.5–5.3)
POTASSIUM SERPL-SCNC: 4 MMOL/L — SIGNIFICANT CHANGE UP (ref 3.5–5.3)
PROTHROM AB SERPL-ACNC: 47.4 SEC — HIGH (ref 9.8–13.1)
RBC # BLD: 3.58 M/UL — LOW (ref 4.2–5.8)
RBC # FLD: 17.2 % — HIGH (ref 10.3–14.5)
SAO2 % BLDA: 97.9 % — SIGNIFICANT CHANGE UP (ref 95–99)
SODIUM BLDA-SCNC: 129 MMOL/L — LOW (ref 136–146)
SODIUM SERPL-SCNC: 131 MMOL/L — LOW (ref 135–145)
WBC # BLD: 8.27 K/UL — SIGNIFICANT CHANGE UP (ref 3.8–10.5)
WBC # FLD AUTO: 8.27 K/UL — SIGNIFICANT CHANGE UP (ref 3.8–10.5)

## 2017-05-24 PROCEDURE — 71010: CPT | Mod: 26

## 2017-05-24 RX ORDER — OXYCODONE HYDROCHLORIDE 5 MG/1
5 TABLET ORAL ONCE
Qty: 0 | Refills: 0 | Status: DISCONTINUED | OUTPATIENT
Start: 2017-05-24 | End: 2017-05-24

## 2017-05-24 RX ORDER — KETOROLAC TROMETHAMINE 30 MG/ML
10 SYRINGE (ML) INJECTION ONCE
Qty: 0 | Refills: 0 | Status: DISCONTINUED | OUTPATIENT
Start: 2017-05-24 | End: 2017-05-24

## 2017-05-24 RX ORDER — ACETAMINOPHEN 500 MG
650 TABLET ORAL ONCE
Qty: 0 | Refills: 0 | Status: DISCONTINUED | OUTPATIENT
Start: 2017-05-24 | End: 2017-06-23

## 2017-05-24 RX ORDER — IBUPROFEN 200 MG
400 TABLET ORAL ONCE
Qty: 0 | Refills: 0 | Status: COMPLETED | OUTPATIENT
Start: 2017-05-24 | End: 2017-05-24

## 2017-05-24 RX ADMIN — SEVELAMER CARBONATE 800 MILLIGRAM(S): 2400 POWDER, FOR SUSPENSION ORAL at 12:32

## 2017-05-24 RX ADMIN — ATORVASTATIN CALCIUM 20 MILLIGRAM(S): 80 TABLET, FILM COATED ORAL at 23:54

## 2017-05-24 RX ADMIN — MIDODRINE HYDROCHLORIDE 30 MILLIGRAM(S): 2.5 TABLET ORAL at 00:25

## 2017-05-24 RX ADMIN — OXYCODONE HYDROCHLORIDE 5 MILLIGRAM(S): 5 TABLET ORAL at 15:30

## 2017-05-24 RX ADMIN — OXYCODONE HYDROCHLORIDE 5 MILLIGRAM(S): 5 TABLET ORAL at 14:45

## 2017-05-24 RX ADMIN — ATORVASTATIN CALCIUM 20 MILLIGRAM(S): 80 TABLET, FILM COATED ORAL at 00:25

## 2017-05-24 RX ADMIN — MIDODRINE HYDROCHLORIDE 30 MILLIGRAM(S): 2.5 TABLET ORAL at 08:45

## 2017-05-24 RX ADMIN — Medication 75 MICROGRAM(S): at 12:32

## 2017-05-24 RX ADMIN — MIDODRINE HYDROCHLORIDE 30 MILLIGRAM(S): 2.5 TABLET ORAL at 23:54

## 2017-05-24 RX ADMIN — AMIODARONE HYDROCHLORIDE 200 MILLIGRAM(S): 400 TABLET ORAL at 12:33

## 2017-05-24 RX ADMIN — ERYTHROPOIETIN 2000 UNIT(S): 10000 INJECTION, SOLUTION INTRAVENOUS; SUBCUTANEOUS at 08:45

## 2017-05-24 RX ADMIN — FINASTERIDE 5 MILLIGRAM(S): 5 TABLET, FILM COATED ORAL at 12:32

## 2017-05-24 RX ADMIN — Medication 100 MILLIGRAM(S): at 12:32

## 2017-05-24 RX ADMIN — SEVELAMER CARBONATE 800 MILLIGRAM(S): 2400 POWDER, FOR SUSPENSION ORAL at 19:01

## 2017-05-24 RX ADMIN — Medication 400 MILLIGRAM(S): at 12:33

## 2017-05-24 NOTE — CHART NOTE - NSCHARTNOTEFT_GEN_A_CORE
RRT called at ~1:15 pm for unresponsiveness and hypoxia in this 62 yom with CHFrEF (19%, on Dobutamine), ESRD (on HD M/W/F), Afib, and DM2. Pt had returned from HD earlier this morning and had desaturated to 68% on 5LNC. He was placed on a NRB. He was taken down to the Vascular lab on NRB for RLE arterial dopplers b/c he has been c/o groin pain. After lying pt flat, he reportedly became unresponsive and his SpO2 on NRB decreased to the 80s so a rapid response was called. Upon arrival, pt's O2 sat gradually improved to the 90s on NRB after he sat up. His mental status also gradually improved to baseline. BP 90s/50s. There was some concern for fluid overload so a CXR was obtained concerning for increased interstitial fluid from CXR yesterday. Bipap to help with the pulmonary edema was considered but it was concerning that his BP might decrease even further with the increased preload. His Nephrologist Dr. Ridley was called who wants to hold off on HD until tomorrow if possible b/c he is unsure if his BP will tolerate it. Pt was returned to the floor on NRB. PA was informed to attempt to wean pt off NRB in a little bit and to call back Dr. Ridley and place pt on Bipap if any further desaturation or increased work of breathing.      Addendum: Went to check on pt in 4N. Nurse reported attempting to place him on NC earlier but he desaturated. I placed pt on 50% venturi mask and he was saturating in the mid 90s but he wanted to try the NC again. Pt placed on 6L NC with 02 sat 95%. No ABG had been sent as discussed during the rapid response so I sent one now and advised the covering PA to please follow it up. RRT called at ~1:15 pm for unresponsiveness and hypoxia in this 62 yom with CHFrEF (19%, on Dobutamine), ESRD (on HD M/W/F), Afib, and DM2. Pt had returned from HD earlier this morning and had desaturated to 68% on 5LNC. He was placed on a NRB. He was taken down to the Vascular lab on NRB for RLE arterial dopplers b/c he has been c/o groin pain. After lying pt flat, he reportedly became unresponsive and his SpO2 on NRB decreased to the 80s so a rapid response was called. Upon arrival, pt's O2 sat gradually improved to the 90s on NRB after he sat up. His mental status also gradually improved to baseline. BP 90s/50s. There was some concern for fluid overload so a CXR was obtained concerning for increased interstitial fluid from CXR yesterday. Bipap to help with the pulmonary edema was considered but it was concerning that his BP might decrease even further with the increased preload. His Nephrologist Dr. Ridley was called who wants to hold off on HD until tomorrow if possible b/c he is unsure if his BP will tolerate it. Did not think an urgent ICU consult was warranted during the RRT as he had come from the floor on NRB and was back at his baseline mental status; furthermore, he is DNR/DNI and did not think the ICU could offer more except perhaps pressor-assisted HD if needed (but Dr. Ridley said to hold off on further HD for now unless he further decompensates). Pt was returned to the floor on NRB. PA was informed to attempt to wean pt off NRB in a little bit and to call back Dr. Ridley and place pt on Bipap in the interim if any further desaturation or increased work of breathing.     Addendum: Went to check on pt in 4N. Nurse reported attempting to place him on NC earlier but he desaturated. I placed pt on 50% venturi mask and he was saturating in the mid 90s but he wanted to try the NC again. Pt placed on 6L NC with 02 sat 95%. No ABG had been sent as discussed during the rapid response so I sent one now and advised the covering PA to please follow it up. RRT called at ~1:15 pm for unresponsiveness and hypoxia in this 62 yom with CHFrEF (19%, on Dobutamine), ESRD (on HD M/W/F), Afib, and DM2. Pt had returned from HD earlier this morning and had desaturated to 68% on 5LNC. He was placed on a NRB. He was taken down to the Vascular lab on NRB for RLE arterial dopplers b/c he has been c/o groin pain. After lying pt flat, he reportedly became unresponsive and his SpO2 on NRB decreased to the 80s so a rapid response was called. Upon arrival, pt's O2 sat gradually improved to the 90s on NRB after he sat up. His mental status also gradually improved to baseline. BP 90s/50s. There was some concern for fluid overload so a CXR was obtained concerning for increased interstitial fluid from CXR yesterday. Bipap to help with the pulmonary edema was considered but it was a concern that his BP might decrease even further 2/2 decreased preload. His Nephrologist Dr. Ridley was called who wants to hold off on HD until tomorrow if possible b/c he is unsure if his BP will tolerate it. Did not think an urgent ICU consult was warranted during the RRT as he had come from the floor on NRB and was back at his baseline mental status; furthermore, he is DNR/DNI and did not think the ICU could offer more except perhaps pressor-assisted HD if needed (but Dr. Ridley said to hold off on further HD for now unless he further decompensates). Pt was returned to the floor on NRB. PA was informed to attempt to wean pt off NRB in a little bit and to call back Dr. Ridley and place pt on Bipap in the interim if any further desaturation or increased work of breathing.     Addendum: Went to check on pt in 4N. Nurse reported attempting to place him on NC earlier but he desaturated. I placed pt on 50% venturi mask and he was saturating in the mid 90s but he wanted to try the NC again. Pt placed on 6L NC with 02 sat 95%. No ABG had been sent as discussed during the rapid response so I sent one now and advised the covering PA to please follow it up.

## 2017-05-24 NOTE — PROGRESS NOTE ADULT - ASSESSMENT
61 yo M with acute on chronic severe systolic CHF (EF 19%), ESRD, DM2, A fib:  - Progressive SOB - off nonrebreather, SOB at baseline  - Acute on chronic systolic CHF - s/p right heart cath, dependent on Dobutamine drip, requiring more frequent HD for fluid removal, prognosis very poor, follow with CHF team  - ESRD - continue HD as per Renal, continue Midodrine, outpatient hd as per renal  - A fib - Coumadin, continue with Amio  - DM2 - controlled, continue with ISS  - Lt arm swelling - located below Lt PICC site, will check US duplex for possible DVT.  - Poor prognosis, arranging transportation for 4 weekly HD sessions and home services for discharge planning, will continue to follow with Palliative

## 2017-05-24 NOTE — PROGRESS NOTE ADULT - ASSESSMENT
61 yo M admitted with End Stage Heart Failure on Dobutamine and HD, Noted with unresponsiveness and hypoxia upon Lying Flat  s/p CXR: Prelim with Increased Congestion  Pt Now Satting 95 on Non Rebreather, Case discussed with Nephrology Re: Further HD, as per His recommendations, will Monitor for Now, If He Continues to Desats, will Consider Bipap.  will monitor pt Closely

## 2017-05-24 NOTE — PROGRESS NOTE ADULT - PROBLEM SELECTOR PLAN 2
On Dobutamine drip, titration as per CHF service.   Overall improved hemodynamics during HD. Cont midodrine.

## 2017-05-24 NOTE — PROGRESS NOTE ADULT - SUBJECTIVE AND OBJECTIVE BOX
Pt seen and examined during dialysis.  Tolerating HD well. BP relatively stable.   No complaints currently.     Allergies:  No Known Allergies    Hospital Medications:   MEDICATIONS  (STANDING):  finasteride 5milliGRAM(s) Oral daily  amiodarone    Tablet 200milliGRAM(s) Oral daily  atorvastatin 20milliGRAM(s) Oral at bedtime  docusate sodium 100milliGRAM(s) Oral daily  midodrine 30milliGRAM(s) Oral every 8 hours  sevelamer hydrochloride 800milliGRAM(s) Oral three times a day with meals  levothyroxine 75MICROGram(s) Oral daily  insulin lispro (HumaLOG) corrective regimen sliding scale  SubCutaneous three times a day before meals  insulin lispro (HumaLOG) corrective regimen sliding scale  SubCutaneous at bedtime  dextrose 5%. 1000milliLiter(s) IV Continuous <Continuous>  dextrose 50% Injectable 12.5Gram(s) IV Push once  dextrose 50% Injectable 25Gram(s) IV Push once  dextrose 50% Injectable 25Gram(s) IV Push once  DOBUTamine Infusion 5.002MICROgram(s)/kG/Min IV Continuous <Continuous>  epoetin alba Injectable 2000Unit(s) IV Push <User Schedule>    REVIEW OF SYSTEMS:  CONSTITUTIONAL: No weakness, fevers or chills  EYES/ENT: No visual changes;  No vertigo or throat pain   NECK: No pain or stiffness  RESPIRATORY: No cough, wheezing, hemoptysis; No shortness of breath  CARDIOVASCULAR: No chest pain or palpitations.  GASTROINTESTINAL: No abdominal or epigastric pain. No nausea, vomiting, or hematemesis; No diarrhea or constipation. No melena or hematochezia.  GENITOURINARY: Anuric  NEUROLOGICAL: No numbness or weakness  SKIN: No itching, burning, rashes, or lesions   VASCULAR: +bilateral lower extremity edema.   All other review of systems is negative unless indicated above.    VITALS:  T(F): 98.6, Max: 99.9 (05-23 @ 11:55)  HR: 83  BP: 108/81  RR: 18  SpO2: 96%  Wt(kg): --      PHYSICAL EXAM:  Constitutional: NAD  HEENT: anicteric sclera, oropharynx clear  Neck: No JVD  Respiratory: Minimal bibasilar crackles, no wheezes, rales or rhonchi  Cardiovascular: S1, S2, RRR  Gastrointestinal: BS+, soft, NT, obese  Extremities: No cyanosis or clubbing. no peripheral edema b/l  Neurological: A/O x 3, no focal deficits  Psychiatric: Normal mood, normal affect  : No CVA tenderness. No rosa.   Skin: No rashes  Vascular Access: RIJ Tunneled cath     LABS:  05-24    131<L>  |  93<L>  |  28<H>  ----------------------------<  106<H>  4.0   |  24  |  5.94<H>    Ca    8.7      24 May 2017 07:08  Mg     1.9     05-24      Creatinine Trend: 5.94 <--, 4.79 <--, 6.09 <--, 5.23 <--, 5.45 <--, 5.17 <--, 6.09 <--                        10.1   8.27  )-----------( 156      ( 24 May 2017 07:00 )             33.0     Urine Studies:      RADIOLOGY & ADDITIONAL STUDIES:

## 2017-05-24 NOTE — PROGRESS NOTE ADULT - ASSESSMENT
Patient is a 62y old  Male who presents with a chief complaint of sob (09 May 2017 15:32). PMHx of severe systolic dysfunction on Dobutamine and ESRD on chronic HD. He is s/p HD and is complaining of right groin site pain that is radiating to his back. He was also reported to be SOB earlier desatting to 65% on O2. Currently he is on a non-rebreather and is comfortable, but has right groin site pain.     Acute on chronic systolic heart failure:  -End stage HF on chronic dobutamine gtt at 5 mg/kg/min. Continue.   -Not on HF meds due to hypotension.  -Maximum fluid removal via HD.    Right groin site pain:  -Since pt had RHC at groin, will get US to r/o any abnormality- hematoma or pseudoaneurysm.  -Will give Motrin 400 mg po x 1 for pain.

## 2017-05-24 NOTE — PROGRESS NOTE ADULT - SUBJECTIVE AND OBJECTIVE BOX
CHIEF COMPLAINT:Patient is a 62y old  Male who presents with a chief complaint of sob (09 May 2017 15:32), currently stable    	    No Known Allergies      PAST MEDICAL & SURGICAL HISTORY:  Chronic hypotension  AF (atrial fibrillation)  COPD (chronic obstructive pulmonary disease): 4L home O2  HLD (hyperlipidemia)  DM (diabetes mellitus)  ESRD (end stage renal disease) on dialysis  BPH (benign prostatic hypertrophy)  Myocardial infarction: 10/2011  Chronic renal insufficiency  Gout  Dyslipidemia  Diabetes mellitus  CHF (congestive heart failure)  AICD (automatic cardioverter/defibrillator) present: Biotronic - placed 9/11/09  H/O coronary angiogram      FAMILY HISTORY:  No pertinent family history in first degree relatives      REVIEW OF SYSTEMS:  CONSTITUTIONAL: No fever, weight loss, or fatigue  EYES: No eye pain, visual disturbances, or discharge  NECK: No pain or stiffness  RESPIRATORY: No cough, wheezing, chills or hemoptysis; Shortness of Breath at baseline  CARDIOVASCULAR: No chest pain, palpitations, passing out, dizziness, or leg swelling. C/o Lt arm swelling.  GASTROINTESTINAL: No abdominal or epigastric pain. No nausea, vomiting, or hematemesis; No diarrhea or constipation. No melena or hematochezia.  GENITOURINARY: No dysuria, frequency, hematuria, or incontinence  NEUROLOGICAL: No headaches, memory loss, loss of strength, numbness, or tremors  SKIN: No itching, burning, rashes, or lesions   LYMPH Nodes: No enlarged glands  ENDOCRINE: No heat or cold intolerance; No hair loss  MUSCULOSKELETAL: No joint pain or swelling; No muscle, back, or extremity pain    Medications:  MEDICATIONS  (STANDING):  finasteride 5milliGRAM(s) Oral daily  amiodarone    Tablet 200milliGRAM(s) Oral daily  atorvastatin 20milliGRAM(s) Oral at bedtime  docusate sodium 100milliGRAM(s) Oral daily  midodrine 30milliGRAM(s) Oral every 8 hours  sevelamer hydrochloride 800milliGRAM(s) Oral three times a day with meals  levothyroxine 75MICROGram(s) Oral daily  insulin lispro (HumaLOG) corrective regimen sliding scale  SubCutaneous three times a day before meals  insulin lispro (HumaLOG) corrective regimen sliding scale  SubCutaneous at bedtime  dextrose 5%. 1000milliLiter(s) IV Continuous <Continuous>  dextrose 50% Injectable 12.5Gram(s) IV Push once  dextrose 50% Injectable 25Gram(s) IV Push once  dextrose 50% Injectable 25Gram(s) IV Push once  DOBUTamine Infusion 5.002MICROgram(s)/kG/Min IV Continuous <Continuous>  epoetin alba Injectable 2000Unit(s) IV Push <User Schedule>  acetaminophen   Tablet. 650milliGRAM(s) Oral once    MEDICATIONS  (PRN):  dextrose Gel 1Dose(s) Oral once PRN Blood Glucose LESS THAN 70 milliGRAM(s)/deciliter  glucagon  Injectable 1milliGRAM(s) IntraMuscular once PRN Glucose LESS THAN 70 milligrams/deciliter  acetaminophen   Tablet 650milliGRAM(s) Oral every 6 hours PRN For Temp greater than 38 C (100.4 F)    	    PHYSICAL EXAM:  T(C): 37.8, Max: 37.8 (05-24 @ 12:27)  HR: 62 (62 - 96)  BP: 94/54 (90/46 - 117/57)  RR: 18 (18 - 18)  SpO2: 96% (95% - 100%)  Wt(kg): --  I&O's Summary    I & Os for current day (as of 24 May 2017 14:50)  =============================================  IN: 600 ml / OUT: 2941 ml / NET: -2341 ml      Appearance: Normal	  HEENT:   Normal oral mucosa, PERRL, EOMI	  Lymphatic: No lymphadenopathy  Cardiovascular: Normal S1 S2, No JVD, No murmurs, No pedal edema, Lt UE edema below PICC  Respiratory: Lungs clear to auscultation	  Psychiatry: A & O x 3, Mood & affect appropriate  Gastrointestinal:  Soft, Non-tender, + BS	  Skin: No rashes, No ecchymoses, No cyanosis	  Neurologic: Non-focal  Extremities: Normal range of motion, No clubbing, cyanosis or edema  Vascular: Peripheral pulses palpable 2+ bilaterally                            10.1   8.27  )-----------( 156      ( 24 May 2017 07:00 )             33.0     05-24    131<L>  |  93<L>  |  28<H>  ----------------------------<  106<H>  4.0   |  24  |  5.94<H>    Ca    8.7      24 May 2017 07:08  Mg     1.9     05-24

## 2017-05-24 NOTE — PROGRESS NOTE ADULT - SUBJECTIVE AND OBJECTIVE BOX
No dyspnea at present  Tolerating HD  BP 90/60  HR 72  Lungs clear  RR 1/6 systolic murmur  No edema (much improved)    Imp: Overall prognosis is quite poor.  His best treatment is HD 4 times per week or 3 times per week (4 hours).  He has not responded to any other modalities.  Continue present CV meds.

## 2017-05-24 NOTE — PROGRESS NOTE ADULT - SUBJECTIVE AND OBJECTIVE BOX
Called By RN to evaluate patient at ultrasound, noted to be unresponsive, Pt is found on Stretcher, Became unresponsive while lying Flat, Pt was arousable, no Complaints  Noted with O2 Sat in 80's off O2  Rapid Response Team was called    T(C): 37.8, Max: 37.8 (05-24 @ 12:27)  HR: 62 (62 - 96)  BP: 94/54 (90/46 - 117/57)  RR: 18 (18 - 18)  SpO2: 96% (95% - 100%)  Wt(kg): --    PE:  Lungs: BiIalteral Crackles  Heart: S1 S2  Abd: Positive BS, Positive Tenderness on R Groin, Soft      PAST MEDICAL & SURGICAL HISTORY:  Chronic hypotension  AF (atrial fibrillation)  COPD (chronic obstructive pulmonary disease): 4L home O2  HLD (hyperlipidemia)  DM (diabetes mellitus)  ESRD (end stage renal disease) on dialysis  BPH (benign prostatic hypertrophy)  Myocardial infarction: 10/2011  Chronic renal insufficiency  Gout  Dyslipidemia  Diabetes mellitus  CHF (congestive heart failure)  AICD (automatic cardioverter/defibrillator) present: Biotronic - placed 9/11/09  H/O coronary angiogram

## 2017-05-24 NOTE — PROGRESS NOTE ADULT - PROBLEM SELECTOR PLAN 1
Outpt hd schedule TTS   Electrolytes acceptable.   Tolerating HD, UF goal 2.5-3kg.   c/w renal diet, fluid restriction.   Will not be logistically possible to have permanent 4 times weekly HD treatments in outpt HD unit.

## 2017-05-24 NOTE — PROGRESS NOTE ADULT - SUBJECTIVE AND OBJECTIVE BOX
Patient is a 62y old  Male who presents with a chief complaint of sob (09 May 2017 15:32). PMHx of severe systolic dysfunction on Dobutamine and ESRD on chronic HD. He is s/p HD and is complaining of right groin site pain that is radiating to his back. He was also reported to be SOB earlier desatting to 65% on O2. Currently he is on a non-rebreather and is comfortable, but has right groin site pain.       INTERVAL HPI/OVERNIGHT EVENTS:    MEDICATIONS  (STANDING):  finasteride 5milliGRAM(s) Oral daily  amiodarone    Tablet 200milliGRAM(s) Oral daily  atorvastatin 20milliGRAM(s) Oral at bedtime  docusate sodium 100milliGRAM(s) Oral daily  midodrine 30milliGRAM(s) Oral every 8 hours  sevelamer hydrochloride 800milliGRAM(s) Oral three times a day with meals  levothyroxine 75MICROGram(s) Oral daily  insulin lispro (HumaLOG) corrective regimen sliding scale  SubCutaneous three times a day before meals  insulin lispro (HumaLOG) corrective regimen sliding scale  SubCutaneous at bedtime  dextrose 5%. 1000milliLiter(s) IV Continuous <Continuous>  dextrose 50% Injectable 12.5Gram(s) IV Push once  dextrose 50% Injectable 25Gram(s) IV Push once  dextrose 50% Injectable 25Gram(s) IV Push once  DOBUTamine Infusion 5.002MICROgram(s)/kG/Min IV Continuous <Continuous>  epoetin alba Injectable 2000Unit(s) IV Push <User Schedule>  acetaminophen   Tablet. 650milliGRAM(s) Oral once    MEDICATIONS  (PRN):  dextrose Gel 1Dose(s) Oral once PRN Blood Glucose LESS THAN 70 milliGRAM(s)/deciliter  glucagon  Injectable 1milliGRAM(s) IntraMuscular once PRN Glucose LESS THAN 70 milligrams/deciliter  acetaminophen   Tablet 650milliGRAM(s) Oral every 6 hours PRN For Temp greater than 38 C (100.4 F)      Allergies    No Known Allergies    Intolerances      Height (cm): 180.3 (05-09 @ 10:29)  Weight (kg): 99.3 (05-09 @ 10:29)  BMI (kg/m2): 30.5 (05-09 @ 10:29)  BSA (m2): 2.19 (05-09 @ 10:29)  Vital Signs Last 24 Hrs  T(C): 37.8, Max: 37.8 (05-24 @ 12:27)  T(F): 100, Max: 100 (05-24 @ 12:27)  HR: 62 (62 - 96)  BP: 94/54 (90/46 - 117/57)  BP(mean): --  RR: 18 (18 - 18)  SpO2: 96% (95% - 100%)  [ ] room air   [ ] 02    PHYSICAL EXAM:  GENERAL:  mild distress due to groin site pain, [ ] Agitated, [ ] Lethargy, [ ] confused   HEAD:  normal  ENMT: normal  NECK:  + JVD   NERVOUS SYSTEM:  Alert & Oriented X3, no focal deficits [ ]Confusion  [ ] Encephalopathic [ ] Sedated [ ] Other  CHEST/LUNG: Clear to auscultation bilaterally anteriorly,  [ ] decreased breath sounds at bases  [ ] wheezing   [ ] rhonchi  [ ] crackles  HEART:  Regular rate and rhythm, No murmurs, rubs, or gallops,  [ ] irregular   ABDOMEN:  soft, nontender, nondistended, positive bowel sounds   [ ] obese  EXTREMITIES: Trace LE edema today  SKIN: [ ] venous stasis skin changes    LABS:                        10.1   8.27  )-----------( 156      ( 24 May 2017 07:00 )             33.0     24 May 2017 07:08    131    |  93     |  28     ----------------------------<  106    4.0     |  24     |  5.94     Ca    8.7        24 May 2017 07:08  Mg     1.9       24 May 2017 07:08      PT/INR - ( 24 May 2017 07:00 )   PT: 47.4 SEC;   INR: 4.11

## 2017-05-25 DIAGNOSIS — I50.9 HEART FAILURE, UNSPECIFIED: ICD-10-CM

## 2017-05-25 LAB
BACTERIA BLD CULT: SIGNIFICANT CHANGE UP
BASOPHILS # BLD AUTO: 0.01 K/UL — SIGNIFICANT CHANGE UP (ref 0–0.2)
BASOPHILS NFR BLD AUTO: 0.1 % — SIGNIFICANT CHANGE UP (ref 0–2)
BUN SERPL-MCNC: 20 MG/DL — SIGNIFICANT CHANGE UP (ref 7–23)
CALCIUM SERPL-MCNC: 9.2 MG/DL — SIGNIFICANT CHANGE UP (ref 8.4–10.5)
CHLORIDE SERPL-SCNC: 97 MMOL/L — LOW (ref 98–107)
CO2 SERPL-SCNC: 25 MMOL/L — SIGNIFICANT CHANGE UP (ref 22–31)
CREAT SERPL-MCNC: 4.67 MG/DL — HIGH (ref 0.5–1.3)
EOSINOPHIL # BLD AUTO: 0.09 K/UL — SIGNIFICANT CHANGE UP (ref 0–0.5)
EOSINOPHIL NFR BLD AUTO: 0.9 % — SIGNIFICANT CHANGE UP (ref 0–6)
GLUCOSE SERPL-MCNC: 126 MG/DL — HIGH (ref 70–99)
HCT VFR BLD CALC: 34.3 % — LOW (ref 39–50)
HGB BLD-MCNC: 10.3 G/DL — LOW (ref 13–17)
IMM GRANULOCYTES NFR BLD AUTO: 0.3 % — SIGNIFICANT CHANGE UP (ref 0–1.5)
INR BLD: 3.81 — HIGH (ref 0.88–1.17)
LYMPHOCYTES # BLD AUTO: 0.97 K/UL — LOW (ref 1–3.3)
LYMPHOCYTES # BLD AUTO: 9.3 % — LOW (ref 13–44)
MAGNESIUM SERPL-MCNC: 2 MG/DL — SIGNIFICANT CHANGE UP (ref 1.6–2.6)
MCHC RBC-ENTMCNC: 27.9 PG — SIGNIFICANT CHANGE UP (ref 27–34)
MCHC RBC-ENTMCNC: 30 % — LOW (ref 32–36)
MCV RBC AUTO: 93 FL — SIGNIFICANT CHANGE UP (ref 80–100)
MONOCYTES # BLD AUTO: 1 K/UL — HIGH (ref 0–0.9)
MONOCYTES NFR BLD AUTO: 9.6 % — SIGNIFICANT CHANGE UP (ref 2–14)
NEUTROPHILS # BLD AUTO: 8.3 K/UL — HIGH (ref 1.8–7.4)
NEUTROPHILS NFR BLD AUTO: 79.8 % — HIGH (ref 43–77)
PLATELET # BLD AUTO: 151 K/UL — SIGNIFICANT CHANGE UP (ref 150–400)
PMV BLD: 12.1 FL — SIGNIFICANT CHANGE UP (ref 7–13)
POTASSIUM SERPL-MCNC: 3.7 MMOL/L — SIGNIFICANT CHANGE UP (ref 3.5–5.3)
POTASSIUM SERPL-SCNC: 3.7 MMOL/L — SIGNIFICANT CHANGE UP (ref 3.5–5.3)
PROTHROM AB SERPL-ACNC: 43.9 SEC — HIGH (ref 9.8–13.1)
RBC # BLD: 3.69 M/UL — LOW (ref 4.2–5.8)
RBC # FLD: 17.4 % — HIGH (ref 10.3–14.5)
SODIUM SERPL-SCNC: 136 MMOL/L — SIGNIFICANT CHANGE UP (ref 135–145)
WBC # BLD: 10.4 K/UL — SIGNIFICANT CHANGE UP (ref 3.8–10.5)
WBC # FLD AUTO: 10.4 K/UL — SIGNIFICANT CHANGE UP (ref 3.8–10.5)

## 2017-05-25 PROCEDURE — 93971 EXTREMITY STUDY: CPT | Mod: 26

## 2017-05-25 PROCEDURE — 93926 LOWER EXTREMITY STUDY: CPT | Mod: 26,LT

## 2017-05-25 RX ORDER — OXYCODONE HYDROCHLORIDE 5 MG/1
5 TABLET ORAL ONCE
Qty: 0 | Refills: 0 | Status: DISCONTINUED | OUTPATIENT
Start: 2017-05-25 | End: 2017-05-25

## 2017-05-25 RX ADMIN — MIDODRINE HYDROCHLORIDE 30 MILLIGRAM(S): 2.5 TABLET ORAL at 05:45

## 2017-05-25 RX ADMIN — FINASTERIDE 5 MILLIGRAM(S): 5 TABLET, FILM COATED ORAL at 10:02

## 2017-05-25 RX ADMIN — AMIODARONE HYDROCHLORIDE 200 MILLIGRAM(S): 400 TABLET ORAL at 05:45

## 2017-05-25 RX ADMIN — ATORVASTATIN CALCIUM 20 MILLIGRAM(S): 80 TABLET, FILM COATED ORAL at 23:13

## 2017-05-25 RX ADMIN — Medication 14.9 MICROGRAM(S)/KG/MIN: at 07:17

## 2017-05-25 RX ADMIN — Medication 75 MICROGRAM(S): at 05:45

## 2017-05-25 RX ADMIN — MIDODRINE HYDROCHLORIDE 30 MILLIGRAM(S): 2.5 TABLET ORAL at 14:18

## 2017-05-25 RX ADMIN — SEVELAMER CARBONATE 800 MILLIGRAM(S): 2400 POWDER, FOR SUSPENSION ORAL at 09:59

## 2017-05-25 RX ADMIN — MIDODRINE HYDROCHLORIDE 30 MILLIGRAM(S): 2.5 TABLET ORAL at 20:57

## 2017-05-25 RX ADMIN — Medication 20.85 MICROGRAM(S)/KG/MIN: at 20:56

## 2017-05-25 RX ADMIN — Medication 100 MILLIGRAM(S): at 05:48

## 2017-05-25 RX ADMIN — Medication 650 MILLIGRAM(S): at 23:12

## 2017-05-25 RX ADMIN — Medication 20.85 MICROGRAM(S)/KG/MIN: at 23:18

## 2017-05-25 RX ADMIN — OXYCODONE HYDROCHLORIDE 5 MILLIGRAM(S): 5 TABLET ORAL at 16:50

## 2017-05-25 RX ADMIN — Medication 20.85 MICROGRAM(S)/KG/MIN: at 18:48

## 2017-05-25 RX ADMIN — OXYCODONE HYDROCHLORIDE 5 MILLIGRAM(S): 5 TABLET ORAL at 16:14

## 2017-05-25 NOTE — PROGRESS NOTE ADULT - SUBJECTIVE AND OBJECTIVE BOX
Pt seen and examined at bedside  feels faurly well  c/o pain rt groin, awaits sono studies/  dyspnea--not bad at rest, on nasal O2  no chest pain,dizzines  had HD yestreday, 2.5 litres removed      Allergies:  No Known Allergies    Hospital Medications:   MEDICATIONS  (STANDING):  finasteride 5milliGRAM(s) Oral daily  amiodarone    Tablet 200milliGRAM(s) Oral daily  atorvastatin 20milliGRAM(s) Oral at bedtime  docusate sodium 100milliGRAM(s) Oral daily  midodrine 30milliGRAM(s) Oral every 8 hours  sevelamer hydrochloride 800milliGRAM(s) Oral three times a day with meals  levothyroxine 75MICROGram(s) Oral daily  insulin lispro (HumaLOG) corrective regimen sliding scale  SubCutaneous three times a day before meals  insulin lispro (HumaLOG) corrective regimen sliding scale  SubCutaneous at bedtime  dextrose 5%. 1000milliLiter(s) IV Continuous <Continuous>  dextrose 50% Injectable 12.5Gram(s) IV Push once  dextrose 50% Injectable 25Gram(s) IV Push once  dextrose 50% Injectable 25Gram(s) IV Push once  DOBUTamine Infusion 5.002MICROgram(s)/kG/Min IV Continuous <Continuous>  epoetin alba Injectable 2000Unit(s) IV Push <User Schedule>  acetaminophen   Tablet. 650milliGRAM(s) Oral once    REVIEW OF SYSTEMS:  CONSTITUTIONAL: No weakness, fevers or chills  EYES/ENT: No visual changes;  No vertigo or throat pain   NECK: No pain or stiffness  RESPIRATORY: No cough, wheezing, hemoptysis; slight dyspnea, over all better  CARDIOVASCULAR: No chest pain or palpitations., dizziness  GASTROINTESTINAL: No abdominal or epigastric pain. No nausea, vomiting, or hematemesis; No diarrhea  No melena or hematochezia.. c/o constipation  GENITOURINARY: No dysuria, frequency, foamy urine, urinary urgency, incontinence or hematuria  NEUROLOGICAL: No numbness or weakness  SKIN: No itching, burning, rashes, or lesions   VASCULAR 1+ bilateral lower extremity edema. , rt > lt  All other review of systems is negative unless indicated above.    VITALS:  T(F): 97.2, Max: 100 (05-24 @ 12:27)  HR: 81  BP: 100/52  RR: 20  SpO2: 95%  Wt(kg): --    I & Os for current day (as of 05-25 @ 10:21)  =============================================  IN: 600 ml / OUT: 2941 ml / NET: -2341 ml      PHYSICAL EXAM:  Constitutional: NAD  HEENT: anicteric sclera, oropharynx clear, MMM  Neck: No JVD  Respiratory: CTAB, no wheezes,  occ crepts at bases  Cardiovascular: S1, S2, RRR  Gastrointestinal: BS+, soft, NT/ND  Extremities: No cyanosis or clubbing.1+ leg edema, rt > lt  Neurological: A/O x 3, no focal deficits  Psychiatric: Normal mood, normal affect  : No CVA tenderness. No rosa.   Skin: No rashes  Vascular Access:permacath    LABS:  05-25    136  |  97<L>  |  20  ----------------------------<  126<H>  3.7   |  25  |  4.67<H>    Ca    9.2      25 May 2017 05:55  Mg     2.0     05-25      Creatinine Trend: 4.67 <--, 5.94 <--, 4.79 <--, 6.09 <--, 5.23 <--, 5.45 <--, 5.17 <--                        10.3   10.40 )-----------( 151      ( 25 May 2017 05:50 )             34.3     Urine Studies:      RADIOLOGY & ADDITIONAL STUDIES:

## 2017-05-25 NOTE — PROGRESS NOTE ADULT - SUBJECTIVE AND OBJECTIVE BOX
Patient is a 62y old  Male who presents with a chief complaint of sob (09 May 2017 15:32)      OVERNIGHT EVENTS: Tele A fib HR 70s  Pt c/o N.V. with sob this am. Pt denies chest pain, fever or chills    MEDICATIONS  (STANDING):  finasteride 5milliGRAM(s) Oral daily  amiodarone    Tablet 200milliGRAM(s) Oral daily  atorvastatin 20milliGRAM(s) Oral at bedtime  midodrine 30milliGRAM(s) Oral every 8 hours  sevelamer hydrochloride 800milliGRAM(s) Oral three times a day with meals  levothyroxine 75MICROGram(s) Oral daily  DOBUTamine Infusion 5.002MICROgram(s)/kG/Min IV Continuous <Continuous>  epoetin alba Injectable 2000Unit(s) IV Push <User Schedule>      MEDICATIONS  (PRN):        Allergies    No Known Allergies    Intolerances        REVIEW OF SYSTEMS:  CONSTITUTIONAL: No fever, No chills, + fatigue, No myalgia, No Body ache  ENMT:  No ear pain, No nose bleed, No vertigo; No sinus or throat pain  NECK: No pain, No stiffness  RESPIRATORY: No cough, wheezing, No  hemoptysis; + shortness of breath  CARDIOVASCULAR: No chest pain, palpitations, + leg swelling  GASTROINTESTINAL: No abdominal or epigastric pain. + nausea, + vomiting; No diarrhea or constipation. [ ] BM  GENITOURINARY: No dysuria, No frequency, No urgency, No hematuria, or incontinence  NEUROLOGICAL: alert and oriented x 3,  No headaches, No dizziness, No numbness,  SKIN:   No itching, burning, rashes, or lesions   MUSCULOSKELETAL: No joint pain or swelling; + muscle pain groin, No back pain, No extremity pain  PSYCHIATRIC: No depression, anxiety, mood swings, or difficulty sleeping  ROS Unable to obtain due to - [ ] Dementia  [ ] Lethargy  REST OF REVIEW Of SYSTEM - [ ] Normal     Height (cm): 180.3 (05-09 @ 10:29)  Weight (kg): 99.3 (05-09 @ 10:29)  BMI (kg/m2): 30.5 (05-09 @ 10:29)  BSA (m2): 2.19 (05-09 @ 10:29)  Vital Signs Last 24 Hrs  T(C): 36.2, Max: 37.8 (05-24 @ 12:27)  T(F): 97.2, Max: 100 (05-24 @ 12:27)  HR: 81 (62 - 86)  BP: 100/52 (88/67 - 103/63)  RR: 20 (18 - 20)  SpO2: 95% (95% - 98%)  [ ] room air   [X ] 02    I & Os for current day (as of 05-25 @ 11:14)  =============================================  IN: 600 ml / OUT: 2941 ml / NET: -2341 ml      PHYSICAL EXAM:  GENERAL:  No acute distresss [ ] Agitated, [ ] Lethargy, [ ] confused   HEAD:  normal  ENMT: normal  NECK:  normal    NERVOUS SYSTEM:  Alert & Oriented X3, no focal deficits [ ]Confusion  [ ] Encephalopathic [ ] Sedated [ ] Other  CHEST/LUNG: Clear to auscultation bilaterally,  [ ] decreased breath sounds at bases  [ ] wheezing   [ ] rhonchi  [ X] crackles  HEART:  Regular rate and rhythm, No murmurs, rubs, or gallops,  [X ] irregular   ABDOMEN:  soft, nontender, nondistended, positive bowel sounds   [ ] obese  EXTREMITIES: No clubbing, cyanosis or edema  MS: right groin pain likely muscle pain. no hematoma or bleeding  SKIN: [ ] venous stasis skin changes    LABS:                        10.3   10.40 )-----------( 151      ( 25 May 2017 05:50 )             34.3     25 May 2017 05:55    136    |  97     |  20     ----------------------------<  126    3.7     |  25     |  4.67     Ca    9.2        25 May 2017 05:55  Mg     2.0       25 May 2017 05:55      PT/INR - ( 25 May 2017 05:55 )   PT: 43.9 SEC;   INR: 3.81          Hemoglobin A1C, Whole Blood: 7.0 % (03-16 @ 20:42)      CAPILLARY BLOOD GLUCOSE  95 (25 May 2017 08:32)  132 (24 May 2017 22:05)  92 (24 May 2017 17:50)  75 (24 May 2017 11:41)    Cultures          Cardiology Images:    1. Mild left atrial enlargement. LA volume index = 34  cc/m2.  2. Normal left ventricular internal dimensions and wall  thicknesses.  3. Severe global left ventricular systolic dysfunction.  4. Severe diastolic dysfunction.  5. Moderate right atrial enlargement.  6. A device wire is noted in the right heart. Right  ventricular enlargement withdecreased right ventricular  systolic function.  7. Normal tricuspid valve.    Moderate tricuspid  regurgitation.  8. Estimated pulmonary artery systolic pressure equals 30  mm Hg, assuming right atrial pressure equals 10  mm Hg,  consistent with normal pulmonary pressures.    Care Discussed with [] Consultants  [x] Patient  [ ] Family  []   /   [ ]RN  DVT prophylaxis [ ] lovenox   [ ] subq heparin  [ ] coumadin  [ ] venodynes [ ] ambulating frequently at how risk for vte and no pharm         or  mechanical prophylaxis required    [ ] other   Advanced directive:    [ ]pt has hcp     [ ] pt declined to assign hcp  Discussed with pt @ bedside

## 2017-05-25 NOTE — CHART NOTE - NSCHARTNOTEFT_GEN_A_CORE
Patients wife and son at bedside. Patient is DNR/I status. wife requesting no medical information to be given to patients sisters. RN made aware .

## 2017-05-25 NOTE — PROGRESS NOTE ADULT - SUBJECTIVE AND OBJECTIVE BOX
CHIEF COMPLAINT:Patient is a 62y old  Male who presents with a chief complaint of sob (09 May 2017 15:32), currently stable    	    No Known Allergies      PAST MEDICAL & SURGICAL HISTORY:  Chronic hypotension  AF (atrial fibrillation)  COPD (chronic obstructive pulmonary disease): 4L home O2  HLD (hyperlipidemia)  DM (diabetes mellitus)  ESRD (end stage renal disease) on dialysis  BPH (benign prostatic hypertrophy)  Myocardial infarction: 10/2011  Chronic renal insufficiency  Gout  Dyslipidemia  Diabetes mellitus  CHF (congestive heart failure)  AICD (automatic cardioverter/defibrillator) present: Biotronic - placed 9/11/09  H/O coronary angiogram      FAMILY HISTORY:  No pertinent family history in first degree relatives      REVIEW OF SYSTEMS:  CONSTITUTIONAL: No fever, weight loss, or fatigue  EYES: No eye pain, visual disturbances, or discharge  NECK: No pain or stiffness  RESPIRATORY: No cough, wheezing, chills or hemoptysis; Shortness of Breath at baseline  CARDIOVASCULAR: No chest pain, palpitations, passing out, dizziness, or leg swelling. C/o Lt arm swelling.  GASTROINTESTINAL: No abdominal or epigastric pain. No nausea, vomiting, or hematemesis; No diarrhea or constipation. No melena or hematochezia.  GENITOURINARY: No dysuria, frequency, hematuria, or incontinence  NEUROLOGICAL: No headaches, memory loss, loss of strength, numbness, or tremors  SKIN: No itching, burning, rashes, or lesions   LYMPH Nodes: No enlarged glands  ENDOCRINE: No heat or cold intolerance; No hair loss  MUSCULOSKELETAL: No joint pain or swelling; No muscle, back, or extremity pain      Medications:  MEDICATIONS  (STANDING):  finasteride 5milliGRAM(s) Oral daily  amiodarone    Tablet 200milliGRAM(s) Oral daily  atorvastatin 20milliGRAM(s) Oral at bedtime  docusate sodium 100milliGRAM(s) Oral daily  midodrine 30milliGRAM(s) Oral every 8 hours  sevelamer hydrochloride 800milliGRAM(s) Oral three times a day with meals  levothyroxine 75MICROGram(s) Oral daily  insulin lispro (HumaLOG) corrective regimen sliding scale  SubCutaneous three times a day before meals  insulin lispro (HumaLOG) corrective regimen sliding scale  SubCutaneous at bedtime  dextrose 5%. 1000milliLiter(s) IV Continuous <Continuous>  dextrose 50% Injectable 12.5Gram(s) IV Push once  dextrose 50% Injectable 25Gram(s) IV Push once  dextrose 50% Injectable 25Gram(s) IV Push once  DOBUTamine Infusion 5.002MICROgram(s)/kG/Min IV Continuous <Continuous>  epoetin alba Injectable 2000Unit(s) IV Push <User Schedule>  acetaminophen   Tablet. 650milliGRAM(s) Oral once  oxyCODONE IR 5milliGRAM(s) Oral once    MEDICATIONS  (PRN):  dextrose Gel 1Dose(s) Oral once PRN Blood Glucose LESS THAN 70 milliGRAM(s)/deciliter  glucagon  Injectable 1milliGRAM(s) IntraMuscular once PRN Glucose LESS THAN 70 milligrams/deciliter  acetaminophen   Tablet 650milliGRAM(s) Oral every 6 hours PRN For Temp greater than 38 C (100.4 F)    	    PHYSICAL EXAM:  T(C): 36.6, Max: 36.6 (05-24 @ 18:58)  HR: 76 (76 - 86)  BP: 100/58 (88/67 - 103/63)  RR: 18 (18 - 20)  SpO2: 95% (95% - 98%)  Wt(kg): --  I&O's Summary  I & Os for 24h ending 25 May 2017 07:00  =============================================  IN: 600 ml / OUT: 2941 ml / NET: -2341 ml    I & Os for current day (as of 25 May 2017 16:17)  =============================================  IN: 400 ml / OUT: 2400 ml / NET: -2000 ml      Appearance: Normal	  HEENT:   Normal oral mucosa, PERRL, EOMI	  Lymphatic: No lymphadenopathy  Cardiovascular: Normal S1 S2, No JVD, No murmurs, No edema  Respiratory: Lungs clear to auscultation	  Psychiatry: A & O x 3, Mood & affect appropriate  Gastrointestinal:  Soft, Non-tender, + BS	  Skin: No rashes, No ecchymoses, No cyanosis	  Neurologic: Non-focal  Extremities: Normal range of motion, No clubbing, cyanosis or edema  Vascular: Peripheral pulses palpable 2+ bilaterally    TELEMETRY: 	    ECG:  	  RADIOLOGY:  OTHER: 	  	  LABS:	 	    CARDIAC MARKERS:                                10.3   10.40 )-----------( 151      ( 25 May 2017 05:50 )             34.3     05-25    136  |  97<L>  |  20  ----------------------------<  126<H>  3.7   |  25  |  4.67<H>    Ca    9.2      25 May 2017 05:55  Mg     2.0     05-25      proBNP:   Lipid Profile:   HgA1c:   TSH:

## 2017-05-25 NOTE — PROGRESS NOTE ADULT - SUBJECTIVE AND OBJECTIVE BOX
SUBJECTIVE: Lying in bed. Still with some right groin pain. Not reproducible to palpation. No other complaints.   	  MEDICATIONS:  amiodarone    Tablet 200milliGRAM(s) Oral daily  midodrine 30milliGRAM(s) Oral every 8 hours  DOBUTamine Infusion 5.002MICROgram(s)/kG/Min IV Continuous <Continuous>        acetaminophen   Tablet 650milliGRAM(s) Oral every 6 hours PRN  acetaminophen   Tablet. 650milliGRAM(s) Oral once    docusate sodium 100milliGRAM(s) Oral daily    finasteride 5milliGRAM(s) Oral daily  atorvastatin 20milliGRAM(s) Oral at bedtime  levothyroxine 75MICROGram(s) Oral daily  insulin lispro (HumaLOG) corrective regimen sliding scale  SubCutaneous three times a day before meals  insulin lispro (HumaLOG) corrective regimen sliding scale  SubCutaneous at bedtime  dextrose Gel 1Dose(s) Oral once PRN  dextrose 50% Injectable 12.5Gram(s) IV Push once  dextrose 50% Injectable 25Gram(s) IV Push once  dextrose 50% Injectable 25Gram(s) IV Push once  glucagon  Injectable 1milliGRAM(s) IntraMuscular once PRN    dextrose 5%. 1000milliLiter(s) IV Continuous <Continuous>  epoetin alba Injectable 2000Unit(s) IV Push <User Schedule>      REVIEW OF SYSTEMS:    CONSTITUTIONAL: No fever, weight loss, or fatigue  EYES: No eye pain, visual disturbances, or discharge  NECK: No pain or stiffness  RESPIRATORY: No cough, wheezing, chills or hemoptysis;(+) Shortness of Breath  CARDIOVASCULAR: No chest pain, palpitations, dizziness, or leg swelling  GASTROINTESTINAL: No abdominal or epigastric pain. No nausea, vomiting, or hematemesis; No diarrhea or constipation. No melena or hematochezia.  GENITOURINARY: No dysuria, frequency, hematuria, or incontinence On HD  NEUROLOGICAL: No headaches, memory loss, loss of strength, numbness, or tremors  SKIN: No itching, burning, rashes, or lesions   LYMPH Nodes: No enlarged glands  MUSCULOSKELETAL: No joint pain or swelling; No muscle, back, or extremity pain  All other review of systems are negative.  	    PHYSICAL EXAM:  T(C): 36.2, Max: 37.8 (05-24 @ 12:27)  HR: 81 (62 - 96)  BP: 100/52 (88/67 - 103/63)  RR: 20 (18 - 20)  SpO2: 95% (95% - 98%)  Wt(kg): --  I&O's Summary    I & Os for current day (as of 25 May 2017 09:05)  =============================================  IN: 600 ml / OUT: 2941 ml / NET: -2341 ml        PHYSICAL EXAM    Appearance: Normal	  HEENT:   Normal oral mucosa, PERRL, EOMI	  NECK: Soft and supple, No LAD, No JVD  Cardiovascular: Regular Rate and Rhythm, Normal S1 S2, No murmurs, No clicks, gallops or rubs  Respiratory: decreased BS bilaterally  Gastrointestinal:  Soft, Non-tender, + BS	  Skin: No rashes, No ecchymoses, No cyanosis  Neurologic: Non-focal  Extremities: No clubbing, cyanosis or edema  Vascular: Peripheral pulses palpable 2+ bilaterally    TELEMETRY: 	  AF 80's      DIAGNOSTIC TESTING:  [ ] Echocardiogram:  [ ] Catheterization:  [ ] Stress Test:    OTHER: 	     	  ECHO:   OBSERVATIONS:  Mitral Valve: Tethered mitral valve leaflets with normal  opening. Mild mitral regurgitation.  Aortic Root: Normal aortic root.  Aortic Valve: Calcified trileaflet aortic valve with normal  opening.  Left Atrium: Mild left atrial enlargement. LA volume index  = 34 cc/m2.  Left Ventricle: Severe global left ventricular systolic  dysfunction. Normal left ventricular internal dimensions  and wall thicknesses. Severe diastolic dysfunction.  Right Heart: Moderate right atrial enlargement. A device  wire is noted in the right heart. Right ventricular  enlargement with decreased right ventricular systolic  function. Normal tricuspid valve.    Moderate tricuspid  regurgitation. Normal pulmonic valve.   Mild pulmonic  regurgitation.  Pericardium/PleuraNormal pericardium with trace pericardial  effusion.  Hemodynamic: Estimated right ventricular systolic pressure  equals 30 mm Hg, assuming right atrial pressure equals 10  mm Hg, consistent with normal pulmonary pressures.  ------------------------------------------------------------------------  CONCLUSIONS:  1. Mild left atrial enlargement. LA volume index = 34  cc/m2.  2. Normal left ventricular internal dimensions and wall  thicknesses.CARDIAC MARKERS:             LABS:	                    10.3   10.40 )-----------( 151      ( 25 May 2017 05:50 )             34.3     05-25    136  |  97<L>  |  20  ----------------------------<  126<H>  3.7   |  25  |  4.67<H>    Ca    9.2      25 May 2017 05:55  Mg     2.0     05-25

## 2017-05-25 NOTE — PROGRESS NOTE ADULT - ATTENDING COMMENTS
Remains significantly dyspneic. Returned from 2hrs of ultrafiltration. Admit 99 today 94.   5, Amnio 200, midodrine,   Exam 88- 100/ 76  decreased AE bilat. bilat LE edema.  Concerned by lack of progress with respect to symptoms.   Could we consider palliative care strategies including morphine drip unless primary team feels that ongoing dyalsis can meaningfully improve his status.   In the meanwhile increase  7ug which might improve BP and symptoms.   Randell Stovall

## 2017-05-25 NOTE — PROGRESS NOTE ADULT - PROBLEM SELECTOR PLAN 1
Continue with dobutamine to 5mcg/kg/min. Pt is dependent on dobutamine.  Max fluid removal with HD, planned for today  follow up psych and palliative recommendations

## 2017-05-25 NOTE — CHART NOTE - NSCHARTNOTEFT_GEN_A_CORE
Called By RN to evaluate patient with hypotension. Family at bedside . Family concerned about blood pressure. Patient laying in bed NAD. Does not appear to have dyspnea at present. Currently on dobutamine s/p HD today .  States shortness of breath unchanged .  Complains of lower back pain since angiogram last week that is intermittent . Given tylenol previously for pain. Denies any nausea, vomiting , or diarrhea at present. Admits to episode of small amount of vomitus earlier . vitals repeated - BP 79/49 . Case discussed with attending and advised to call RRT , consider CCU or MICU admission . RRT called and team present at bedside      T(C): 36.4, Max: 36.6 (05-25 @ 13:40)  HR: 69 (69 - 88)  BP: 79/49 (79/49 - 103/63)  RR: 18 (18 - 20)  SpO2: 96% (95% - 96%)  Wt(kg): --    PAST MEDICAL & SURGICAL HISTORY:  Chronic hypotension  AF (atrial fibrillation)  COPD (chronic obstructive pulmonary disease): 4L home O2  HLD (hyperlipidemia)  DM (diabetes mellitus)  ESRD (end stage renal disease) on dialysis  BPH (benign prostatic hypertrophy)  Myocardial infarction: 10/2011  Chronic renal insufficiency  Gout  Dyslipidemia  Diabetes mellitus  CHF (congestive heart failure)  AICD (automatic cardioverter/defibrillator) present: Biotronic - placed 9/11/09  H/O coronary angiogram    Lungs : B/l crackles  CVS: S1, s2 irreg  Abd: soft non tender no rebound, no guarding , + Bowel sounds  Ext: b/l edema no calf tenderness , Rt groin no hematoma no bleeding . Rt lower back pain on palpation .  Lt PICC in place   currently alert to person and place , unsure of year     63yo M with hx of NICM , severe LVD with AICD on dobutamine gtt, Afib on coumadin , COPD on home O2, chronic hypotension on midodrine s/p HD session today RRT called for worsening hypotension  RRT team at bedside  CCU evaluation performed - d/w NP not a candidate for CCU at this time - continue midodrine and monitor BP . Currently DNR/I  - continue O2 via nasal cannula   - m onitor vitals  - received midodine dosage now - BP improved  - rt groin duplex negative for pseudoaneurysm

## 2017-05-25 NOTE — PROGRESS NOTE ADULT - ASSESSMENT
63 yo M with acute on chronic severe systolic CHF (EF 19%), ESRD, DM2, A fib:  - Progressive SOB - off nonrebreather, SOB at baseline  - Acute on chronic systolic CHF - s/p right heart cath, dependent on Dobutamine drip, requiring more frequent HD for fluid removal, prognosis very poor, follow with CHF team  - ESRD - continue HD as per Renal, continue Midodrine, outpatient hd as per renal  - A fib - Coumadin, continue with Amio  - DM2 - controlled, continue with ISS  - Lt arm swelling - located below Lt PICC site, will check US duplex for possible DVT.  - Poor prognosis, arranging transportation for 4 weekly HD sessions and home services for discharge planning, will continue to follow with Palliative

## 2017-05-25 NOTE — PROGRESS NOTE ADULT - ASSESSMENT
Advanced HF  On Dobutamin gtt  Await U/S re: R groin pain  Appreciate HF service follow up  Cont HD per Nephrology  Continue present care

## 2017-05-25 NOTE — CHART NOTE - NSCHARTNOTEFT_GEN_A_CORE
On arrival, pt awake and alert, + spontaneous respirations.     RRT called for hypotension 70s/40s, down from 100s/50s a few hours ago. DNR/DNI    Pt has severe NICM on home  admitted for syncope.     On exam pt is alert, oriented to self but not to place (different hospital) or time (). Cold and weak pulses.     CHF team has been consulted  already. CCU NP called for tx to CCU vs just increasing . Repeat /52.     Pt stable for tele unit for now, endorsed to 4N PA.     ARMANDO SCHUSTER [id number]

## 2017-05-25 NOTE — CHART NOTE - NSCHARTNOTEFT_GEN_A_CORE
Called to evaluate patient for CCU.     Patient is a 62y old  Male with a PMHx of NICM with severe LV dysfunction (EF 19%) S/P Biotronic ICD placement on Dobutamine gtt at home, ESRD on HD M/W/F, atrial fibrillation on Coumadin, COPD on 4L home O2, HLD, DM, chronic hypotension on Midodrine, presents to ED S/P syncopal episode (09 May 2017 15:32) which was likely induced by hypotension and was admitted to telemetry for acute on chronic heart failure. Patient received aggressive HD for CHF and uptitrated dobutamine dose to 7 mcg on 5/25. RRT called for hypotension 70s/40s, down from 100s/50s a few hours ago. On exam pt is alert, oriented to self but not to place (different hospital) or time ([facility]). Patient warm to touch. Repeat /52. and 96/41.       Allergies  No Known Allergies      MEDICATIONS  finasteride 5milliGRAM(s) Oral daily  amiodarone    Tablet 200milliGRAM(s) Oral daily  atorvastatin 20milliGRAM(s) Oral at bedtime  docusate sodium 100milliGRAM(s) Oral daily  midodrine 30milliGRAM(s) Oral every 8 hours  sevelamer hydrochloride 800milliGRAM(s) Oral three times a day with meals  levothyroxine 75MICROGram(s) Oral daily  insulin lispro (HumaLOG) corrective regimen sliding scale  SubCutaneous three times a day before meals  insulin lispro (HumaLOG) corrective regimen sliding scale  SubCutaneous at bedtime  dextrose 5%. 1000milliLiter(s) IV Continuous <Continuous>  dextrose Gel 1Dose(s) Oral once PRN  dextrose 50% Injectable 12.5Gram(s) IV Push once  dextrose 50% Injectable 25Gram(s) IV Push once  dextrose 50% Injectable 25Gram(s) IV Push once  glucagon  Injectable 1milliGRAM(s) IntraMuscular once PRN  DOBUTamine Infusion 7MICROgram(s)/kG/Min IV Continuous <Continuous>  acetaminophen   Tablet 650milliGRAM(s) Oral every 6 hours PRN  epoetin alba Injectable 2000Unit(s) IV Push <User Schedule>  acetaminophen   Tablet. 650milliGRAM(s) Oral once      REVIEW OF SYSTEMS:    + Lethargy, + decreased mental status    All other review of systems is negative unless indicated above.    Vital Signs Last 24 Hrs  T(C): 36.4, Max: 36.6 (05-25 @ 13:40)  T(F): 97.6, Max: 97.9 (05-25 @ 13:40)  HR: 84 (69 - 88)  BP: 102/53 (79/49 - 103/63)  BP(mean): --  RR: 18 (18 - 20)  SpO2: 95% (95% - 96%)  PHYSICAL EXAM:    GENERAL: NAD, no distress, lethargic  HEAD:  Atraumatic, Normocephalic  EYES: EOMI, PERRLA, conjunctiva and sclera clear  ENT: dry mucous membranes  NECK: Supple, No JVD, No bruit  NERVOUS SYSTEM:  Alert & Oriented X 1-2, moves all extremities  CHEST/LUNG: Clear to percussion bilaterally; No rales, No rhonchi, No wheezing  HEART: Regular rate and rhythm; S1, S2, No murmurs, No rubs or gallops  ABDOMEN: Soft, Nontender, Bowel sounds present x 4  EXTREMITIES:  +1 edema, 1+ Peripheral Pulses, No clubbing, No cyanosis  LYMPH: No lymphadenopathy noted  SKIN: warm and dry    LABORATORY VALUES	 	                          10.3   10.40 )-----------( 151      ( 25 May 2017 05:50 )             34.3     05-25    136  |  97<L>  |  20  ----------------------------<  126<H>  3.7   |  25  |  4.67<H>  05-24    131<L>  |  93<L>  |  28<H>  ----------------------------<  106<H>  4.0   |  24  |  5.94<H>    Ca    9.2      25 May 2017 05:55  Ca    8.7      24 May 2017 07:08  Mg     2.0     05-25  Mg     1.9     05-24    Prothrombin Time, Plasma: 43.9 SEC (05-25 @ 05:55)  INR: 3.81 (05-25 @ 05:55)  Hemoglobin A1C, Whole Blood: 7.1 % (05-10 @ 06:30)  Hemoglobin A1C, Whole Blood: 7.0 % (03-16 @ 20:42)    Thyroid Stimulating Hormone, Serum: 4.82 uIU/mL (05-10 @ 06:30)  Thyroid Stimulating Hormone, Serum: 4.75 uIU/mL (04-26 @ 12:00)  Thyroid Stimulating Hormone, Serum: 4.05 uIU/mL (04-18 @ 06:10)  ABG - ( 24 May 2017 16:08 )  pH: 7.48  /  pCO2: 37    /  pO2: 95    / HCO3: 28    / Base Excess: 3.8   /  SaO2: 97.9      CAPILLARY BLOOD GLUCOSE  119 (25 May 2017 21:21)        ASSESSMENT AND PLAN    62y old  Male with a PMHx of NICM with severe LV dysfunction (EF 19%) S/P Biotronic ICD placement on Dobutamine gtt at home, ESRD on HD M/W/F, atrial fibrillation on Coumadin, COPD on 4L home O2, HLD, DM, chronic hypotension on Midodrine, presents to ED S/P syncopal episode (09 May 2017 15:32) which was likely induced by hypotension and was admitted to telemetry for acute on chronic heart failure. Patient now s/p RRT for hypotension. BP back to baseline now.     Plan:    Patient hemodynamically stable at present. No indication for CCU at current time. Will reevaluate as needed.  Patient may receive IVF bolus with  ml x 1 if any further hypotension  May also increase Dobutamine to 10 mcg.           Case discussed with Hipolito Langston MD  Cardiology Fellow

## 2017-05-26 LAB
BASOPHILS # BLD AUTO: 0.01 K/UL — SIGNIFICANT CHANGE UP (ref 0–0.2)
BASOPHILS NFR BLD AUTO: 0.1 % — SIGNIFICANT CHANGE UP (ref 0–2)
BUN SERPL-MCNC: 24 MG/DL — HIGH (ref 7–23)
CALCIUM SERPL-MCNC: 9.2 MG/DL — SIGNIFICANT CHANGE UP (ref 8.4–10.5)
CHLORIDE SERPL-SCNC: 93 MMOL/L — LOW (ref 98–107)
CO2 SERPL-SCNC: 27 MMOL/L — SIGNIFICANT CHANGE UP (ref 22–31)
CREAT SERPL-MCNC: 5.54 MG/DL — HIGH (ref 0.5–1.3)
EOSINOPHIL # BLD AUTO: 0.13 K/UL — SIGNIFICANT CHANGE UP (ref 0–0.5)
EOSINOPHIL NFR BLD AUTO: 1.2 % — SIGNIFICANT CHANGE UP (ref 0–6)
GLUCOSE SERPL-MCNC: 104 MG/DL — HIGH (ref 70–99)
HCT VFR BLD CALC: 31.2 % — LOW (ref 39–50)
HGB BLD-MCNC: 9.6 G/DL — LOW (ref 13–17)
IMM GRANULOCYTES NFR BLD AUTO: 0.4 % — SIGNIFICANT CHANGE UP (ref 0–1.5)
INR BLD: 4.35 — HIGH (ref 0.88–1.17)
LYMPHOCYTES # BLD AUTO: 1.08 K/UL — SIGNIFICANT CHANGE UP (ref 1–3.3)
LYMPHOCYTES # BLD AUTO: 9.7 % — LOW (ref 13–44)
MAGNESIUM SERPL-MCNC: 2 MG/DL — SIGNIFICANT CHANGE UP (ref 1.6–2.6)
MCHC RBC-ENTMCNC: 28.1 PG — SIGNIFICANT CHANGE UP (ref 27–34)
MCHC RBC-ENTMCNC: 30.8 % — LOW (ref 32–36)
MCV RBC AUTO: 91.2 FL — SIGNIFICANT CHANGE UP (ref 80–100)
MONOCYTES # BLD AUTO: 0.99 K/UL — HIGH (ref 0–0.9)
MONOCYTES NFR BLD AUTO: 8.9 % — SIGNIFICANT CHANGE UP (ref 2–14)
NEUTROPHILS # BLD AUTO: 8.86 K/UL — HIGH (ref 1.8–7.4)
NEUTROPHILS NFR BLD AUTO: 79.7 % — HIGH (ref 43–77)
PLATELET # BLD AUTO: 173 K/UL — SIGNIFICANT CHANGE UP (ref 150–400)
PMV BLD: 12.1 FL — SIGNIFICANT CHANGE UP (ref 7–13)
POTASSIUM SERPL-MCNC: 3.8 MMOL/L — SIGNIFICANT CHANGE UP (ref 3.5–5.3)
POTASSIUM SERPL-SCNC: 3.8 MMOL/L — SIGNIFICANT CHANGE UP (ref 3.5–5.3)
PROTHROM AB SERPL-ACNC: 50.2 SEC — HIGH (ref 9.8–13.1)
RBC # BLD: 3.42 M/UL — LOW (ref 4.2–5.8)
RBC # FLD: 17.2 % — HIGH (ref 10.3–14.5)
SODIUM SERPL-SCNC: 133 MMOL/L — LOW (ref 135–145)
WBC # BLD: 11.11 K/UL — HIGH (ref 3.8–10.5)
WBC # FLD AUTO: 11.11 K/UL — HIGH (ref 3.8–10.5)

## 2017-05-26 PROCEDURE — 71010: CPT | Mod: 26

## 2017-05-26 RX ADMIN — ATORVASTATIN CALCIUM 20 MILLIGRAM(S): 80 TABLET, FILM COATED ORAL at 23:48

## 2017-05-26 RX ADMIN — MIDODRINE HYDROCHLORIDE 30 MILLIGRAM(S): 2.5 TABLET ORAL at 15:31

## 2017-05-26 RX ADMIN — Medication 75 MICROGRAM(S): at 07:03

## 2017-05-26 RX ADMIN — Medication 20.85 MICROGRAM(S)/KG/MIN: at 07:34

## 2017-05-26 RX ADMIN — ERYTHROPOIETIN 2000 UNIT(S): 10000 INJECTION, SOLUTION INTRAVENOUS; SUBCUTANEOUS at 17:34

## 2017-05-26 RX ADMIN — AMIODARONE HYDROCHLORIDE 200 MILLIGRAM(S): 400 TABLET ORAL at 07:03

## 2017-05-26 RX ADMIN — MIDODRINE HYDROCHLORIDE 30 MILLIGRAM(S): 2.5 TABLET ORAL at 07:03

## 2017-05-26 NOTE — CHART NOTE - NSCHARTNOTEFT_GEN_A_CORE
Patient alert and oriented to person place and time. Requesting to recind DNR /I . Fully understands CPR/ Intubation and wishes to be full code. Patient with O2 sats 80's . RRT called , CCU called . All teams at bedside. BIPAP to be  initiated. Wife called multiple times. No answer .   Will need to reconsult palliative in am to discuss goals of care. Continue to monitor closely .

## 2017-05-26 NOTE — PROGRESS NOTE ADULT - SUBJECTIVE AND OBJECTIVE BOX
CHIEF COMPLAINT:Patient is a 62y old  Male who presents with a chief complaint of sob (09 May 2017 15:32), currently with progressive SOB    	    No Known Allergies      PAST MEDICAL & SURGICAL HISTORY:  Chronic hypotension  AF (atrial fibrillation)  COPD (chronic obstructive pulmonary disease): 4L home O2  HLD (hyperlipidemia)  DM (diabetes mellitus)  ESRD (end stage renal disease) on dialysis  BPH (benign prostatic hypertrophy)  Myocardial infarction: 10/2011  Chronic renal insufficiency  Gout  Dyslipidemia  Diabetes mellitus  CHF (congestive heart failure)  AICD (automatic cardioverter/defibrillator) present: Biotronic - placed 9/11/09  H/O coronary angiogram      FAMILY HISTORY:  No pertinent family history in first degree relatives      REVIEW OF SYSTEMS:  CONSTITUTIONAL: No fever, weight loss, or fatigue  EYES: No eye pain, visual disturbances, or discharge  NECK: No pain or stiffness  RESPIRATORY: No cough, wheezing, chills or hemoptysis; Shortness of Breath worsening  CARDIOVASCULAR: No chest pain, palpitations, passing out, dizziness, or leg swelling. C/o Lt arm swelling.  GASTROINTESTINAL: No abdominal or epigastric pain. No nausea, vomiting, or hematemesis; No diarrhea or constipation. No melena or hematochezia.  GENITOURINARY: No dysuria, frequency, hematuria, or incontinence  NEUROLOGICAL: No headaches, memory loss, loss of strength, numbness, or tremors  SKIN: No itching, burning, rashes, or lesions   LYMPH Nodes: No enlarged glands  ENDOCRINE: No heat or cold intolerance; No hair loss  MUSCULOSKELETAL: No joint pain or swelling; No muscle, back, or extremity pain      Medications:  MEDICATIONS  (STANDING):  finasteride 5milliGRAM(s) Oral daily  amiodarone    Tablet 200milliGRAM(s) Oral daily  atorvastatin 20milliGRAM(s) Oral at bedtime  docusate sodium 100milliGRAM(s) Oral daily  midodrine 30milliGRAM(s) Oral every 8 hours  sevelamer hydrochloride 800milliGRAM(s) Oral three times a day with meals  levothyroxine 75MICROGram(s) Oral daily  insulin lispro (HumaLOG) corrective regimen sliding scale  SubCutaneous three times a day before meals  insulin lispro (HumaLOG) corrective regimen sliding scale  SubCutaneous at bedtime  dextrose 5%. 1000milliLiter(s) IV Continuous <Continuous>  dextrose 50% Injectable 12.5Gram(s) IV Push once  dextrose 50% Injectable 25Gram(s) IV Push once  dextrose 50% Injectable 25Gram(s) IV Push once  DOBUTamine Infusion 7MICROgram(s)/kG/Min IV Continuous <Continuous>  epoetin alba Injectable 2000Unit(s) IV Push <User Schedule>  acetaminophen   Tablet. 650milliGRAM(s) Oral once    MEDICATIONS  (PRN):  dextrose Gel 1Dose(s) Oral once PRN Blood Glucose LESS THAN 70 milliGRAM(s)/deciliter  glucagon  Injectable 1milliGRAM(s) IntraMuscular once PRN Glucose LESS THAN 70 milligrams/deciliter  acetaminophen   Tablet 650milliGRAM(s) Oral every 6 hours PRN For Temp greater than 38 C (100.4 F)    	    PHYSICAL EXAM:  T(C): 36.6, Max: 36.9 (05-26 @ 14:06)  HR: 84 (69 - 92)  BP: 103/55 (79/49 - 103/55)  RR: 21 (17 - 21)  SpO2: 100% (95% - 100%)  Wt(kg): --  I&O's Summary    I & Os for current day (as of 26 May 2017 17:55)  =============================================  IN: 400 ml / OUT: 2400 ml / NET: -2000 ml      Appearance: Normal	  HEENT:   Normal oral mucosa, PERRL, EOMI	  Lymphatic: No lymphadenopathy  Cardiovascular: Normal S1 S2, No JVD, No murmurs, No edema  Respiratory: Lungs clear to auscultation	  Psychiatry: A & O x 3, Mood & affect appropriate  Gastrointestinal:  Soft, Non-tender, + BS	  Skin: No rashes, No ecchymoses, No cyanosis	  Neurologic: Non-focal  Extremities: Normal range of motion, No clubbing, cyanosis or edema  Vascular: Peripheral pulses palpable 2+ bilaterally                            9.6    11.11 )-----------( 173      ( 26 May 2017 07:40 )             31.2     05-26    133<L>  |  93<L>  |  24<H>  ----------------------------<  104<H>  3.8   |  27  |  5.54<H>    Ca    9.2      26 May 2017 07:40  Mg     2.0     05-26

## 2017-05-26 NOTE — PROGRESS NOTE ADULT - PROBLEM SELECTOR PLAN 1
Outpt hd schedule TTS   Electrolytes acceptable. Worsening pulm edema.    Pt had isolated UF yesterday, with 2kg UF achieved. Plan for repeat HD today, at bedside. UF goal 2-3 kg, as BP tolerates. Increased dobumatine dose noted.   Despite near daily dialysis treatment to help optimize fluid status, pt continues to have worsening pulm edema and dyspnea. Dialysis not adequate to maintain euvolemia, in setting of severe, dobutamine dependent CHF. Very poor prognosis.   Moreover, it will not be logistically possible to have permanent 4 times weekly HD treatments in outpt HD unit.  Agree with pall care f/u to discuss GOC.   c/w renal diet, fluid restriction.

## 2017-05-26 NOTE — PROGRESS NOTE ADULT - ASSESSMENT
61 yo M with acute on chronic severe systolic CHF (EF 19%), ESRD, DM2, A fib:  - Progressive SOB - on BiPAP, get CXR, possibly chest CT to eval for HCAP on top of CHF in view of worsening SOB and slight rise in WBC  - Acute on chronic systolic CHF - continue inotropic support as per Cardio, HD for maximum fluid removal  - ESRD - continue HD as per Renal, continue Midodrine  - A fib - Coumadin, continue with Amio  - DM2 - controlled, continue with ISS  - Lt arm swelling - improving, no DVT  - Prognosis very poor, however, DNR resented by pt after RRT overnight. Outpt HD at schedule required by pt not possible as per Renal, will follow with Palliative service.

## 2017-05-26 NOTE — CHART NOTE - NSCHARTNOTEFT_GEN_A_CORE
On arrival, pt awake and alert, + spontaneous respirations.     RRT called for hypoxemia. Way staff noted desat to ~ 80 w/ minimal improvement on NRB and had labored breathing and which has in the interim improved. Pt reportedly rescinded DNR/DNI while in a state of capacity to make such decisions.    At this time SpO2 99 on VM 50% @ 8L, mild accessory muscle use, BB crackles.     More likely brittle volume status and extremely poor LVSF causing transient worsening of pulm edema.     Plan to start BiPAP for pulm edema, endorsed to way PA.     ARMANDO SCHUSTER [id number]

## 2017-05-26 NOTE — PROGRESS NOTE ADULT - SUBJECTIVE AND OBJECTIVE BOX
Pt seen and examined at bedside  Overnight events noted, including RRT x2 for SOB.   DNR/DNI rescinded. Currently on Bipap.     Allergies:  No Known Allergies    Hospital Medications:   MEDICATIONS  (STANDING):  finasteride 5milliGRAM(s) Oral daily  amiodarone    Tablet 200milliGRAM(s) Oral daily  atorvastatin 20milliGRAM(s) Oral at bedtime  docusate sodium 100milliGRAM(s) Oral daily  midodrine 30milliGRAM(s) Oral every 8 hours  sevelamer hydrochloride 800milliGRAM(s) Oral three times a day with meals  levothyroxine 75MICROGram(s) Oral daily  insulin lispro (HumaLOG) corrective regimen sliding scale  SubCutaneous three times a day before meals  insulin lispro (HumaLOG) corrective regimen sliding scale  SubCutaneous at bedtime  dextrose 5%. 1000milliLiter(s) IV Continuous <Continuous>  dextrose 50% Injectable 12.5Gram(s) IV Push once  dextrose 50% Injectable 25Gram(s) IV Push once  dextrose 50% Injectable 25Gram(s) IV Push once  DOBUTamine Infusion 7MICROgram(s)/kG/Min IV Continuous <Continuous>  epoetin alba Injectable 2000Unit(s) IV Push <User Schedule>  acetaminophen   Tablet. 650milliGRAM(s) Oral once    REVIEW OF SYSTEMS:  CONSTITUTIONAL: No weakness, fevers or chills  EYES/ENT: No visual changes;  No vertigo or throat pain   NECK: No pain or stiffness  RESPIRATORY: +SOB   CARDIOVASCULAR: No chest pain or palpitations.  GASTROINTESTINAL: No abdominal or epigastric pain. No nausea, vomiting, or hematemesis; No diarrhea or constipation. No melena or hematochezia.  GENITOURINARY: Anuria   NEUROLOGICAL: No numbness or weakness  SKIN: No itching, burning, rashes, or lesions   VASCULAR: +bilateral lower extremity edema.   All other review of systems is negative unless indicated above.    VITALS:  T(F): 97.8, Max: 97.9 (05-25 @ 13:40)  HR: 92  BP: 97/44  RR: 17  SpO2: 99%  Wt(kg): --    I & Os for current day (as of 05-26 @ 11:51)  =============================================  IN: 400 ml / OUT: 2400 ml / NET: -2000 ml      PHYSICAL EXAM:  Constitutional: NAD.  HEENT: anicteric sclera, oropharynx clear, MMM  Neck: +JVD  Respiratory: Bibasilar crackles   Cardiovascular: S1, S2, RRR  Gastrointestinal: BS+, soft, NT/ND  Extremities: 1+ peripheral edema in b/l LE   Neurological: A/O x 3, no focal deficits  Psychiatric: Normal mood, normal affect  : No CVA tenderness. No rosa.   Skin: No rashes  Vascular Access: RIJ Tunneled cath    LABS:  05-26    133<L>  |  93<L>  |  24<H>  ----------------------------<  104<H>  3.8   |  27  |  5.54<H>    Ca    9.2      26 May 2017 07:40  Mg     2.0     05-26      Creatinine Trend: 5.54 <--, 4.67 <--, 5.94 <--, 4.79 <--, 6.09 <--, 5.23 <--, 5.45 <--                        9.6    11.11 )-----------( 173      ( 26 May 2017 07:40 )             31.2

## 2017-05-26 NOTE — CHART NOTE - NSCHARTNOTEFT_GEN_A_CORE
Called By RN to evaluate patient with hypoxia. Patient laying in bed NAD complains of shortness of breath O2 Sat 85% . Patient lung exam remains unchanged. Placed on ventimask - Non rebreather . Patient repositioned. O2 sat immediately improved . Patient feeling better. Immediate CXR ordered to eval pulmonary edema. discussed with nursing staff will now wean off back to baseline oxygen.    T(C): 36.4, Max: 36.6 (05-25 @ 13:40)  HR: 84 (69 - 88)  BP: 102/53 (79/49 - 102/53)  RR: 18 (18 - 20)  SpO2: 95% (95% - 96%)  Wt(kg): --    PAST MEDICAL & SURGICAL HISTORY:  Chronic hypotension  AF (atrial fibrillation)  COPD (chronic obstructive pulmonary disease): 4L home O2  HLD (hyperlipidemia)  DM (diabetes mellitus)  ESRD (end stage renal disease) on dialysis  BPH (benign prostatic hypertrophy)  Myocardial infarction: 10/2011  Chronic renal insufficiency  Gout  Dyslipidemia  Diabetes mellitus  CHF (congestive heart failure)  AICD (automatic cardioverter/defibrillator) present: Biotronic - placed 9/11/09  H/O coronary angiogram

## 2017-05-26 NOTE — PROGRESS NOTE ADULT - PROBLEM SELECTOR PLAN 2
On Dobutamine drip, titration as per CHF service.   High UF goal limited by hypotension during HD. Cont midodrine.

## 2017-05-26 NOTE — PROGRESS NOTE ADULT - ASSESSMENT
Patient is a 62y old  Male who presents with a chief complaint of sob (09 May 2017 15:32). Overnight pt had a rapid response for hypotension and SOB desatting to the 80s per note. He was placed on BIPAP and seems to be more comfortable today. He is no longer DNR.    Acute on chronic systolic HF with ESRD on HD:  -Overnight events noted. Only way to get rid of his accumulating fluid is via aggressive HD.  -Will d/w Dr. Stovall if dobutamine should be decreased to 5mcg/kg/min.  -Pt is end stage HF on inotrope with renal dysfunction on HD, and pulmonary fibrosis. Has a very poor prognosis. I have had numerous discussions with patient about this. He understands his condition is poor.

## 2017-05-26 NOTE — CHART NOTE - NSCHARTNOTEFT_GEN_A_CORE
Called to evaluate patient for CCU.     Patient is a 62y old  Male with a PMHx of NICM with severe LV dysfunction (EF 19%) S/P Biotronic ICD placement on Dobutamine gtt at home, ESRD on HD M/W/F, atrial fibrillation on Coumadin, COPD on 4L home O2, HLD, DM, chronic hypotension on Midodrine, presents to ED S/P syncopal episode (09 May 2017 15:32) which was likely induced by hypotension and was admitted to telemetry for acute on chronic heart failure. Patient received aggressive HD for CHF and uptitrated dobutamine dose to 7 mcg on 5/25. RRT was called for decreased SpO2 to 80s. As per RN, patient SpO2 decreased to 80s on 6L nasal O2 and was started on non rebreather mask. Patient currently on Venti-mask with FiO2 50% and is saturating 97%. RR 24. No tachypnea. Patient stated he feels SOB. Family at bedside.     Off note: Patient rescinded DNR status and is currently full code.       Allergies  No Known Allergies      MEDICATIONS    amiodarone    Tablet 200milliGRAM(s) Oral daily  midodrine 30milliGRAM(s) Oral every 8 hours  DOBUTamine Infusion 7MICROgram(s)/kG/Min IV Continuous <Continuous>  acetaminophen   Tablet 650milliGRAM(s) Oral every 6 hours PRN  acetaminophen   Tablet. 650milliGRAM(s) Oral once  docusate sodium 100milliGRAM(s) Oral daily  finasteride 5milliGRAM(s) Oral daily  atorvastatin 20milliGRAM(s) Oral at bedtime  levothyroxine 75MICROGram(s) Oral daily  insulin lispro (HumaLOG) corrective regimen sliding scale  SubCutaneous three times a day before meals  insulin lispro (HumaLOG) corrective regimen sliding scale  SubCutaneous at bedtime  dextrose Gel 1Dose(s) Oral once PRN  dextrose 50% Injectable 12.5Gram(s) IV Push once  dextrose 50% Injectable 25Gram(s) IV Push once  dextrose 50% Injectable 25Gram(s) IV Push once  glucagon  Injectable 1milliGRAM(s) IntraMuscular once PRN  dextrose 5%. 1000milliLiter(s) IV Continuous <Continuous>  epoetin alba Injectable 2000Unit(s) IV Push <User Schedule>      REVIEW OF SYSTEMS:    + SOB    All other review of systems is negative unless indicated above.    T(F): 97.6, Max: 97.9 (05-25 @ 13:40)  HR: 88 (69 - 88)  BP: 102/53 (79/49 - 102/53)  RR: 18  SpO2: 96% (95% - 96%)  Wt(kg): --    PHYSICAL EXAMINATION     GENERAL: NAD, mild SOB, lethargic  HEAD:  Atraumatic, Normocephalic  EYES: EOMI, PERRLA,   ENT: dry mucous membranes  NECK: Supple, No JVD, No bruit  NERVOUS SYSTEM:  Alert & Oriented X 3, moves all extremities  CHEST/LUNG: + basilar crackles, No rhonchi, No wheezing  HEART: Regular rate and rhythm; S1, S2,  ABDOMEN: Soft, Nontender, Bowel sounds present x 4  EXTREMITIES:  +1 edema, 1+ Peripheral Pulses, No clubbing, No cyanosis  LYMPH: No lymphadenopathy noted  SKIN: warm and dry  	  LABORATORY VALUES	 	                          10.3   10.40 )-----------( 151      ( 25 May 2017 05:50 )             34.3       05-25    136  |  97<L>  |  20  ----------------------------<  126<H>  3.7   |  25  |  4.67<H>  05-24    131<L>  |  93<L>  |  28<H>  ----------------------------<  106<H>  4.0   |  24  |  5.94<H>    Ca    9.2      25 May 2017 05:55  Ca    8.7      24 May 2017 07:08  Mg     2.0     05-25  Mg     1.9     05-24  Prothrombin Time, Plasma: 43.9 SEC (05-25 @ 05:55)  05-10 @ 06:30  Cholesterol, Serum - 89  Direct LDL- 31  HDL Cholesterol, Serum- 40  Triglycerides, Serum- 61  Hemoglobin A1C, Whole Blood: 7.1 % (05-10 @ 06:30)  Thyroid Stimulating Hormone, Serum: 4.82 uIU/mL (05-10 @ 06:30)  ABG - ( 24 May 2017 16:08 )  pH: 7.48  /  pCO2: 37    /  pO2: 95    / HCO3: 28    / Base Excess: 3.8   /  SaO2: 97.9    CAPILLARY BLOOD GLUCOSE  141 (25 May 2017 22:06)    05-20 @ 20:53  Culture - Blood:   NO ORGANISMS ISOLATED  Gram Stain Blood: --  Method Type: --  Organism: --  Organism Identification: --            ASSESSMENT AND PLAN    62y old  Male with a PMHx of NICM with severe LV dysfunction (EF 19%) S/P Biotronic ICD placement on Dobutamine gtt at home, ESRD on HD M/W/F, atrial fibrillation on Coumadin, COPD on 4L home O2, HLD, DM, chronic hypotension on Midodrine, presents to ED S/P syncopal episode (09 May 2017 15:32) which was likely induced by hypotension and was admitted to telemetry for acute on chronic heart failure. Patient now s/p RRT for low O2 sat. SPO2 currently 97% on ventimask 50%    Plan:    Patient hemodynamically stable at present. No indication for CCU at current time. Will reevaluate as needed.  Will start BiPAP at 10/5. FiO2 50%. Titrate for SpO2 > 92%.  CXR done.   Patient end stage heart failure. Prognosis poor.       Case discussed with Hipolito Langston MD  Cardiology Fellow

## 2017-05-26 NOTE — PROGRESS NOTE ADULT - SUBJECTIVE AND OBJECTIVE BOX
.   Today patient was resting comfortably sleeping, did not wake him up.     MEDICATIONS  (STANDING):  finasteride 5milliGRAM(s) Oral daily  amiodarone    Tablet 200milliGRAM(s) Oral daily  atorvastatin 20milliGRAM(s) Oral at bedtime  docusate sodium 100milliGRAM(s) Oral daily  midodrine 30milliGRAM(s) Oral every 8 hours  sevelamer hydrochloride 800milliGRAM(s) Oral three times a day with meals  levothyroxine 75MICROGram(s) Oral daily  insulin lispro (HumaLOG) corrective regimen sliding scale  SubCutaneous three times a day before meals  insulin lispro (HumaLOG) corrective regimen sliding scale  SubCutaneous at bedtime  dextrose 5%. 1000milliLiter(s) IV Continuous <Continuous>  dextrose 50% Injectable 12.5Gram(s) IV Push once  dextrose 50% Injectable 25Gram(s) IV Push once  dextrose 50% Injectable 25Gram(s) IV Push once  DOBUTamine Infusion 7MICROgram(s)/kG/Min IV Continuous <Continuous>  epoetin alba Injectable 2000Unit(s) IV Push <User Schedule>  acetaminophen   Tablet. 650milliGRAM(s) Oral once    MEDICATIONS  (PRN):  dextrose Gel 1Dose(s) Oral once PRN Blood Glucose LESS THAN 70 milliGRAM(s)/deciliter  glucagon  Injectable 1milliGRAM(s) IntraMuscular once PRN Glucose LESS THAN 70 milligrams/deciliter  acetaminophen   Tablet 650milliGRAM(s) Oral every 6 hours PRN For Temp greater than 38 C (100.4 F)      Allergies    No Known Allergies    Intolerances    Height (cm): 180.3 (05-09 @ 10:29)  Weight (kg): 99.3 (05-09 @ 10:29)  BMI (kg/m2): 30.5 (05-09 @ 10:29)  BSA (m2): 2.19 (05-09 @ 10:29)  Vital Signs Last 24 Hrs  T(C): 36.6, Max: 36.6 (05-25 @ 13:40)  T(F): 97.8, Max: 97.9 (05-25 @ 13:40)  HR: 90 (69 - 92)  BP: 98/55 (79/49 - 102/53)  BP(mean): --  RR: 18 (17 - 18)  SpO2: 100% (95% - 100%)  [ ] room air   [ ] 02    PHYSICAL EXAM:  GENERAL:  No acute distress, well appearing, [ ] Agitated, [ ] Lethargy, [ ] confused   HEAD:  normal  ENMT: normal  NECK:  normal    NERVOUS SYSTEM:  Alert & Oriented X3, no focal deficits [ ]Confusion  [ ] Encephalopathic [ ] Sedated [ ] Other  CHEST/LUNG: Clear to auscultation bilaterally,  [ ] decreased breath sounds at bases  [ ] wheezing   [ ] rhonchi  [ ] crackles  HEART:  Regular rate and rhythm, No murmurs, rubs, or gallops,  [ ] irregular   ABDOMEN:  soft, nontender, nondistended, positive bowel sounds   [ ] obese  EXTREMITIES: No clubbing, cyanosis or edema  SKIN: [ ] venous stasis skin changes    LABS:                        9.6    11.11 )-----------( 173      ( 26 May 2017 07:40 )             31.2     26 May 2017 07:40    133    |  93     |  24     ----------------------------<  104    3.8     |  27     |  5.54     Ca    9.2        26 May 2017 07:40  Mg     2.0       26 May 2017 07:40      PT/INR - ( 26 May 2017 07:40 )   PT: 50.2 SEC;   INR: 4.35 Patient is a 62y old  Male who presents with a chief complaint of sob (09 May 2017 15:32). Overnight pt had a rapid response for hypotension and SOB desatting to the 80s per note. He was placed on BIPAP and seems to be more comfortable today. He is no longer DNR  Today patient was resting comfortably sleeping, did not wake him up.     MEDICATIONS  (STANDING):  finasteride 5milliGRAM(s) Oral daily  amiodarone    Tablet 200milliGRAM(s) Oral daily  atorvastatin 20milliGRAM(s) Oral at bedtime  docusate sodium 100milliGRAM(s) Oral daily  midodrine 30milliGRAM(s) Oral every 8 hours  sevelamer hydrochloride 800milliGRAM(s) Oral three times a day with meals  levothyroxine 75MICROGram(s) Oral daily  insulin lispro (HumaLOG) corrective regimen sliding scale  SubCutaneous three times a day before meals  insulin lispro (HumaLOG) corrective regimen sliding scale  SubCutaneous at bedtime  dextrose 5%. 1000milliLiter(s) IV Continuous <Continuous>  dextrose 50% Injectable 12.5Gram(s) IV Push once  dextrose 50% Injectable 25Gram(s) IV Push once  dextrose 50% Injectable 25Gram(s) IV Push once  DOBUTamine Infusion 7MICROgram(s)/kG/Min IV Continuous <Continuous>  epoetin alba Injectable 2000Unit(s) IV Push <User Schedule>  acetaminophen   Tablet. 650milliGRAM(s) Oral once    MEDICATIONS  (PRN):  dextrose Gel 1Dose(s) Oral once PRN Blood Glucose LESS THAN 70 milliGRAM(s)/deciliter  glucagon  Injectable 1milliGRAM(s) IntraMuscular once PRN Glucose LESS THAN 70 milligrams/deciliter  acetaminophen   Tablet 650milliGRAM(s) Oral every 6 hours PRN For Temp greater than 38 C (100.4 F)      Allergies    No Known Allergies    Intolerances    Height (cm): 180.3 (05-09 @ 10:29)  Weight (kg): 99.3 (05-09 @ 10:29)  BMI (kg/m2): 30.5 (05-09 @ 10:29)  BSA (m2): 2.19 (05-09 @ 10:29)  Vital Signs Last 24 Hrs  T(C): 36.6, Max: 36.6 (05-25 @ 13:40)  T(F): 97.8, Max: 97.9 (05-25 @ 13:40)  HR: 90 (69 - 92)  BP: 98/55 (79/49 - 102/53)  BP(mean): --  RR: 18 (17 - 18)  SpO2: 100% (95% - 100%)  [ ] room air   [ ] 02    PHYSICAL EXAM:  GENERAL:  No acute distress, well appearing, [ ] Agitated, [ ] Lethargy, [ ] confused   HEAD:  normal  ENMT: normal  NECK:  normal    NERVOUS SYSTEM:  Alert & Oriented X3, no focal deficits [ ]Confusion  [ ] Encephalopathic [ ] Sedated [ ] Other  CHEST/LUNG: Clear to auscultation bilaterally,  [ ] decreased breath sounds at bases  [ ] wheezing   [ ] rhonchi  [ ] crackles  HEART:  Regular rate and rhythm, No murmurs, rubs, or gallops,  [ ] irregular   ABDOMEN:  soft, nontender, nondistended, positive bowel sounds   [ ] obese  EXTREMITIES: No clubbing, cyanosis or edema  SKIN: [ ] venous stasis skin changes    LABS:                        9.6    11.11 )-----------( 173      ( 26 May 2017 07:40 )             31.2     26 May 2017 07:40    133    |  93     |  24     ----------------------------<  104    3.8     |  27     |  5.54     Ca    9.2        26 May 2017 07:40  Mg     2.0       26 May 2017 07:40      PT/INR - ( 26 May 2017 07:40 )   PT: 50.2 SEC;   INR: 4.35

## 2017-05-27 LAB
BASOPHILS # BLD AUTO: 0.02 K/UL — SIGNIFICANT CHANGE UP (ref 0–0.2)
BASOPHILS NFR BLD AUTO: 0.2 % — SIGNIFICANT CHANGE UP (ref 0–2)
BUN SERPL-MCNC: 15 MG/DL — SIGNIFICANT CHANGE UP (ref 7–23)
CALCIUM SERPL-MCNC: 9.3 MG/DL — SIGNIFICANT CHANGE UP (ref 8.4–10.5)
CHLORIDE SERPL-SCNC: 99 MMOL/L — SIGNIFICANT CHANGE UP (ref 98–107)
CO2 SERPL-SCNC: 26 MMOL/L — SIGNIFICANT CHANGE UP (ref 22–31)
CREAT SERPL-MCNC: 4.36 MG/DL — HIGH (ref 0.5–1.3)
EOSINOPHIL # BLD AUTO: 0.09 K/UL — SIGNIFICANT CHANGE UP (ref 0–0.5)
EOSINOPHIL NFR BLD AUTO: 0.8 % — SIGNIFICANT CHANGE UP (ref 0–6)
GLUCOSE SERPL-MCNC: 58 MG/DL — LOW (ref 70–99)
HCT VFR BLD CALC: 30.4 % — LOW (ref 39–50)
HGB BLD-MCNC: 9.3 G/DL — LOW (ref 13–17)
IMM GRANULOCYTES NFR BLD AUTO: 0.3 % — SIGNIFICANT CHANGE UP (ref 0–1.5)
INR BLD: 4.44 — HIGH (ref 0.88–1.17)
LYMPHOCYTES # BLD AUTO: 1.2 K/UL — SIGNIFICANT CHANGE UP (ref 1–3.3)
LYMPHOCYTES # BLD AUTO: 10.1 % — LOW (ref 13–44)
MAGNESIUM SERPL-MCNC: 1.8 MG/DL — SIGNIFICANT CHANGE UP (ref 1.6–2.6)
MCHC RBC-ENTMCNC: 28.1 PG — SIGNIFICANT CHANGE UP (ref 27–34)
MCHC RBC-ENTMCNC: 30.6 % — LOW (ref 32–36)
MCV RBC AUTO: 91.8 FL — SIGNIFICANT CHANGE UP (ref 80–100)
MONOCYTES # BLD AUTO: 1.45 K/UL — HIGH (ref 0–0.9)
MONOCYTES NFR BLD AUTO: 12.2 % — SIGNIFICANT CHANGE UP (ref 2–14)
NEUTROPHILS # BLD AUTO: 9.13 K/UL — HIGH (ref 1.8–7.4)
NEUTROPHILS NFR BLD AUTO: 76.4 % — SIGNIFICANT CHANGE UP (ref 43–77)
PLATELET # BLD AUTO: 165 K/UL — SIGNIFICANT CHANGE UP (ref 150–400)
PMV BLD: 10.7 FL — SIGNIFICANT CHANGE UP (ref 7–13)
POTASSIUM SERPL-MCNC: 3.7 MMOL/L — SIGNIFICANT CHANGE UP (ref 3.5–5.3)
POTASSIUM SERPL-SCNC: 3.7 MMOL/L — SIGNIFICANT CHANGE UP (ref 3.5–5.3)
PROTHROM AB SERPL-ACNC: 51.3 SEC — HIGH (ref 9.8–13.1)
RBC # BLD: 3.31 M/UL — LOW (ref 4.2–5.8)
RBC # FLD: 17.1 % — HIGH (ref 10.3–14.5)
SODIUM SERPL-SCNC: 140 MMOL/L — SIGNIFICANT CHANGE UP (ref 135–145)
WBC # BLD: 11.93 K/UL — HIGH (ref 3.8–10.5)
WBC # FLD AUTO: 11.93 K/UL — HIGH (ref 3.8–10.5)

## 2017-05-27 PROCEDURE — 71010: CPT | Mod: 26

## 2017-05-27 RX ORDER — TRAMADOL HYDROCHLORIDE 50 MG/1
50 TABLET ORAL ONCE
Qty: 0 | Refills: 0 | Status: DISCONTINUED | OUTPATIENT
Start: 2017-05-27 | End: 2017-05-27

## 2017-05-27 RX ORDER — ACETAMINOPHEN 500 MG
650 TABLET ORAL EVERY 6 HOURS
Qty: 0 | Refills: 0 | Status: DISCONTINUED | OUTPATIENT
Start: 2017-05-27 | End: 2017-06-23

## 2017-05-27 RX ORDER — DEXTROSE 50 % IN WATER 50 %
12.5 SYRINGE (ML) INTRAVENOUS ONCE
Qty: 0 | Refills: 0 | Status: COMPLETED | OUTPATIENT
Start: 2017-05-27 | End: 2017-05-27

## 2017-05-27 RX ADMIN — MIDODRINE HYDROCHLORIDE 30 MILLIGRAM(S): 2.5 TABLET ORAL at 17:29

## 2017-05-27 RX ADMIN — Medication 20.85 MICROGRAM(S)/KG/MIN: at 11:40

## 2017-05-27 RX ADMIN — Medication 12.5 GRAM(S): at 07:46

## 2017-05-27 RX ADMIN — Medication 75 MICROGRAM(S): at 06:24

## 2017-05-27 RX ADMIN — TRAMADOL HYDROCHLORIDE 50 MILLIGRAM(S): 50 TABLET ORAL at 04:31

## 2017-05-27 RX ADMIN — FINASTERIDE 5 MILLIGRAM(S): 5 TABLET, FILM COATED ORAL at 11:38

## 2017-05-27 RX ADMIN — Medication 12.5 GRAM(S): at 11:45

## 2017-05-27 RX ADMIN — MIDODRINE HYDROCHLORIDE 30 MILLIGRAM(S): 2.5 TABLET ORAL at 22:43

## 2017-05-27 RX ADMIN — Medication 100 MILLIGRAM(S): at 11:38

## 2017-05-27 RX ADMIN — MIDODRINE HYDROCHLORIDE 30 MILLIGRAM(S): 2.5 TABLET ORAL at 06:24

## 2017-05-27 RX ADMIN — ATORVASTATIN CALCIUM 20 MILLIGRAM(S): 80 TABLET, FILM COATED ORAL at 22:44

## 2017-05-27 RX ADMIN — Medication 650 MILLIGRAM(S): at 11:39

## 2017-05-27 RX ADMIN — AMIODARONE HYDROCHLORIDE 200 MILLIGRAM(S): 400 TABLET ORAL at 06:24

## 2017-05-27 RX ADMIN — TRAMADOL HYDROCHLORIDE 50 MILLIGRAM(S): 50 TABLET ORAL at 03:31

## 2017-05-27 RX ADMIN — SEVELAMER CARBONATE 800 MILLIGRAM(S): 2400 POWDER, FOR SUSPENSION ORAL at 17:30

## 2017-05-27 NOTE — PROGRESS NOTE ADULT - PROBLEM SELECTOR PLAN 1
Outpt hd schedule TTS   Electrolytes acceptable. Worsening pulm edema.    s/p HD yesterday- flowsheet reviewed  will need UF session today- 2h with 1.5 to 2h kg uf  Despite near daily dialysis treatment to help optimize fluid status, pt continues to have worsening pulm edema and dyspnea. Dialysis not adequate to maintain euvolemia, in setting of severe, dobutamine dependent CHF. Very poor prognosis.   Moreover, it will not be logistically possible to have permanent 4 times weekly HD treatments in outpt HD unit.  Agree with pall care f/u to discuss GOC.   c/w renal diet, fluid restriction.

## 2017-05-27 NOTE — CHART NOTE - NSCHARTNOTEFT_GEN_A_CORE
Hypoglycemia this AM, asymptomatic. Pt denies dizziness, weakness, change in vision, HA, abd pain, n/v/d, CP. SOB/ Hypoglycemia treated with 1/2 amp D50. Repeat finger stick 90. Improved, will continue to monitor. Unable to start D5 in setting of ESRD requiring HD daily. Will attempt to wean off BIPAP to tolerate oral diet.    Pt tolerating BIPAP well, continuous pulse Ox 100%. Pt has been NPO over last 48hrs. Discussed with Dr. Irvin, rec tube feedings if unable to wean off BIPAP. Discussed with Pt and family at bedside, would prefer to avoid feeding tube at this time and attempt to wean off BIPAP. Pt placed on Venti-mask at 50%, tolerating well and later decreased to 40%, current pulse O2 maintained ~90-94%. Will transfer Pt to NC vs. high flow NC to tolerate oral diet with respiratory at bedside and resume Venti-mask at 40% after eating. Discussed with Pt and Pt's family at bedside who agree to plan.

## 2017-05-27 NOTE — PROGRESS NOTE ADULT - SUBJECTIVE AND OBJECTIVE BOX
CHIEF COMPLAINT:Patient is a 62y old  Male who presents with a chief complaint of sob (09 May 2017 15:32)  pt on bipap  lethargic arousable      	        PAST MEDICAL & SURGICAL HISTORY:  Chronic hypotension  AF (atrial fibrillation)  COPD (chronic obstructive pulmonary disease): 4L home O2  HLD (hyperlipidemia)  DM (diabetes mellitus)  ESRD (end stage renal disease) on dialysis  BPH (benign prostatic hypertrophy)  Myocardial infarction: 10/2011  Chronic renal insufficiency  Gout  Dyslipidemia  Diabetes mellitus  CHF (congestive heart failure)  AICD (automatic cardioverter/defibrillator) present: Biotronic - placed 9/11/09  H/O coronary angiogram          REVIEW OF SYSTEMS:  CONSTITUTIONAL: No fever, weight loss, or fatigue  EYES: No eye pain, visual disturbances, or discharge  NECK: No pain or stiffness  RESPIRATORY: No cough, wheezing, chills or hemoptysis;==S HORTNESS OF BREATH   CARDIOVASCULAR: No chest pain, palpitations, passing out, dizziness, or leg swelling  GASTROINTESTINAL: No abdominal or epigastric pain. No nausea, vomiting, or hematemesis; No diarrhea or constipation. No melena or hematochezia.  GENITOURINARY: No dysuria, frequency, hematuria, or incontinence  NEUROLOGICAL: No headaches, memory loss, loss of strength, numbness, or tremors  SKIN: No itching, burning, rashes, or lesions   LYMPH Nodes: No enlarged glands  ENDOCRINE: No heat or cold intolerance; No hair loss  MUSCULOSKELETAL: No joint pain or swelling; No muscle, back, or extremity pain lower ext swelling    Medications:  MEDICATIONS  (STANDING):  finasteride 5milliGRAM(s) Oral daily  amiodarone    Tablet 200milliGRAM(s) Oral daily  atorvastatin 20milliGRAM(s) Oral at bedtime  docusate sodium 100milliGRAM(s) Oral daily  midodrine 30milliGRAM(s) Oral every 8 hours  sevelamer hydrochloride 800milliGRAM(s) Oral three times a day with meals  levothyroxine 75MICROGram(s) Oral daily  insulin lispro (HumaLOG) corrective regimen sliding scale  SubCutaneous three times a day before meals  insulin lispro (HumaLOG) corrective regimen sliding scale  SubCutaneous at bedtime  dextrose 5%. 1000milliLiter(s) IV Continuous <Continuous>  dextrose 50% Injectable 12.5Gram(s) IV Push once  dextrose 50% Injectable 25Gram(s) IV Push once  dextrose 50% Injectable 25Gram(s) IV Push once  DOBUTamine Infusion 7MICROgram(s)/kG/Min IV Continuous <Continuous>  epoetin alba Injectable 2000Unit(s) IV Push <User Schedule>  acetaminophen   Tablet. 650milliGRAM(s) Oral once    MEDICATIONS  (PRN):  dextrose Gel 1Dose(s) Oral once PRN Blood Glucose LESS THAN 70 milliGRAM(s)/deciliter  glucagon  Injectable 1milliGRAM(s) IntraMuscular once PRN Glucose LESS THAN 70 milligrams/deciliter  acetaminophen   Tablet 650milliGRAM(s) Oral every 6 hours PRN For Temp greater than 38 C (100.4 F)    	    PHYSICAL EXAM:  T(C): 36.8, Max: 36.9 (05-26 @ 14:06)  HR: 87 (84 - 92)  BP: 84/53 (84/53 - 139/78)  RR: 18 (18 - 21)  SpO2: 100% (98% - 100%)  Wt(kg): --  I&O's Summary    I & Os for current day (as of 27 May 2017 11:26)  =============================================  IN: 1440 ml / OUT: 3200 ml / NET: -1760 ml      Appearance: Normal	  HEENT:   Normal oral mucosa, PERRL, EOMI	  Lymphatic: No lymphadenopathy  Cardiovascular: Normal S1 S2, No JVD, No murmurs, No edema  Respiratory: Lungs clear to auscultation	  Psychiatry: A & O x 3, Mood & affect appropriate  Gastrointestinal:  Soft, Non-tender, + BS	  Skin: No rashes, No ecchymoses, No cyanosis	  Neurologic: Non-focal  Extremities: Normal range of motion, No clubbing, cyanosis or edema  Vascular: Peripheral pulses palpable 2+ bilaterally    TELEMETRY: 	    ECG:  	  RADIOLOGY:  OTHER: 	  	  LABS:	 	    CARDIAC MARKERS:                                9.3    11.93 )-----------( 165      ( 27 May 2017 07:00 )             30.4     05-27    140  |  99  |  15  ----------------------------<  58<L>  3.7   |  26  |  4.36<H>    Ca    9.3      27 May 2017 07:00  Mg     1.8     05-27      proBNP:   Lipid Profile:   HgA1c:   TSH:

## 2017-05-27 NOTE — PROGRESS NOTE ADULT - ASSESSMENT
63 yo M with acute on chronic severe systolic CHF (EF 19%), ESRD, DM2, A fib:  - Progressive SOB - on BiPAP, cxr w/ chf , possibly chest CT to eval for HCAP on top of CHF in view of worsening SOB and slight rise in WBC  - Acute on chronic systolic CHF - continue inotropic support as per Cardio, HD for maximum fluid removal  - ESRD - continue HD as per Renal, continue Midodrine  - A fib - Coumadin, continue with Amio  - DM2 - controlled, continue with ISS  - Lt arm swelling - improving, no DVT  - Prognosis very poor, however, DNR denied by pt and family. Outpt HD at schedule required by pt not possible as per Renal, will follow with Palliative service.f/u  pt family not interested in hospice or palliative but pt end stage w/ no alternative tx which would help  spoke w/ family at bedside

## 2017-05-27 NOTE — PROGRESS NOTE ADULT - SUBJECTIVE AND OBJECTIVE BOX
patient seen and examined   on bipap    No Known Allergies    Hospital Medications:   MEDICATIONS  (STANDING):  finasteride 5milliGRAM(s) Oral daily  amiodarone    Tablet 200milliGRAM(s) Oral daily  atorvastatin 20milliGRAM(s) Oral at bedtime  docusate sodium 100milliGRAM(s) Oral daily  midodrine 30milliGRAM(s) Oral every 8 hours  sevelamer hydrochloride 800milliGRAM(s) Oral three times a day with meals  levothyroxine 75MICROGram(s) Oral daily  insulin lispro (HumaLOG) corrective regimen sliding scale  SubCutaneous three times a day before meals  insulin lispro (HumaLOG) corrective regimen sliding scale  SubCutaneous at bedtime  dextrose 5%. 1000milliLiter(s) IV Continuous <Continuous>  dextrose 50% Injectable 12.5Gram(s) IV Push once  dextrose 50% Injectable 25Gram(s) IV Push once  dextrose 50% Injectable 25Gram(s) IV Push once  DOBUTamine Infusion 7MICROgram(s)/kG/Min IV Continuous <Continuous>  epoetin alba Injectable 2000Unit(s) IV Push <User Schedule>  acetaminophen   Tablet. 650milliGRAM(s) Oral once    REVIEW OF SYSTEMS:  CONSTITUTIONAL: No weakness, fevers or chills  EYES/ENT: No visual changes;  No vertigo or throat pain   NECK: No pain or stiffness  RESPIRATORY: No cough, wheezing, hemoptysis; No shortness of breath  CARDIOVASCULAR: No chest pain or palpitations.  GASTROINTESTINAL: No abdominal or epigastric pain. No nausea, vomiting, or hematemesis; No diarrhea or constipation. No melena or hematochezia.  GENITOURINARY: No dysuria, frequency, foamy urine, urinary urgency, incontinence or hematuria  NEUROLOGICAL: No numbness or weakness  SKIN: No itching, burning, rashes, or lesions   VASCULAR: No bilateral lower extremity edema.   All other review of systems is negative unless indicated above.    VITALS:  T(F): 98.3, Max: 98.4 (05-26 @ 14:06)  HR: 84  BP: 88/53  RR: 18  SpO2: 98%  Wt(kg): --    I & Os for current day (as of 05-27 @ 09:18)  =============================================  IN: 1440 ml / OUT: 3200 ml / NET: -1760 ml      PHYSICAL EXAM:  Constitutional: NAD.  HEENT: anicteric sclera, oropharynx clear, MMM  Neck: +JVD  Respiratory: Bibasilar crackles   Cardiovascular: S1, S2, RRR  Gastrointestinal: BS+, soft, NT/ND  Extremities: 1+ peripheral edema in b/l LE   Neurological: A/O x 3, no focal deficits  Psychiatric: Normal mood, normal affect  : No CVA tenderness. No rosa.   Skin: No rashes  Vascular Access: RIJ Tunneled cath    LABS:  05-27    140  |  99  |  15  ----------------------------<  58<L>  3.7   |  26  |  4.36<H>    Ca    9.3      27 May 2017 07:00  Mg     1.8     05-27      Creatinine Trend: 4.36 <--, 5.54 <--, 4.67 <--, 5.94 <--, 4.79 <--, 6.09 <--, 5.23 <--                        9.3    11.93 )-----------( 165      ( 27 May 2017 07:00 )             30.4     Urine Studies:      RADIOLOGY & ADDITIONAL STUDIES:

## 2017-05-28 LAB
BASOPHILS # BLD AUTO: 0.01 K/UL — SIGNIFICANT CHANGE UP (ref 0–0.2)
BASOPHILS NFR BLD AUTO: 0.1 % — SIGNIFICANT CHANGE UP (ref 0–2)
BUN SERPL-MCNC: 21 MG/DL — SIGNIFICANT CHANGE UP (ref 7–23)
CALCIUM SERPL-MCNC: 9.4 MG/DL — SIGNIFICANT CHANGE UP (ref 8.4–10.5)
CHLORIDE SERPL-SCNC: 98 MMOL/L — SIGNIFICANT CHANGE UP (ref 98–107)
CO2 SERPL-SCNC: 24 MMOL/L — SIGNIFICANT CHANGE UP (ref 22–31)
CREAT SERPL-MCNC: 5.44 MG/DL — HIGH (ref 0.5–1.3)
EOSINOPHIL # BLD AUTO: 0.1 K/UL — SIGNIFICANT CHANGE UP (ref 0–0.5)
EOSINOPHIL NFR BLD AUTO: 0.8 % — SIGNIFICANT CHANGE UP (ref 0–6)
GLUCOSE SERPL-MCNC: 107 MG/DL — HIGH (ref 70–99)
HCT VFR BLD CALC: 31.8 % — LOW (ref 39–50)
HGB BLD-MCNC: 9.4 G/DL — LOW (ref 13–17)
IMM GRANULOCYTES NFR BLD AUTO: 0.3 % — SIGNIFICANT CHANGE UP (ref 0–1.5)
INR BLD: 5.72 — CRITICAL HIGH (ref 0.88–1.17)
LYMPHOCYTES # BLD AUTO: 1.16 K/UL — SIGNIFICANT CHANGE UP (ref 1–3.3)
LYMPHOCYTES # BLD AUTO: 9.7 % — LOW (ref 13–44)
MAGNESIUM SERPL-MCNC: 1.9 MG/DL — SIGNIFICANT CHANGE UP (ref 1.6–2.6)
MCHC RBC-ENTMCNC: 27.4 PG — SIGNIFICANT CHANGE UP (ref 27–34)
MCHC RBC-ENTMCNC: 29.6 % — LOW (ref 32–36)
MCV RBC AUTO: 92.7 FL — SIGNIFICANT CHANGE UP (ref 80–100)
MONOCYTES # BLD AUTO: 1.15 K/UL — HIGH (ref 0–0.9)
MONOCYTES NFR BLD AUTO: 9.6 % — SIGNIFICANT CHANGE UP (ref 2–14)
NEUTROPHILS # BLD AUTO: 9.52 K/UL — HIGH (ref 1.8–7.4)
NEUTROPHILS NFR BLD AUTO: 79.5 % — HIGH (ref 43–77)
PHOSPHATE SERPL-MCNC: 2.5 MG/DL — SIGNIFICANT CHANGE UP (ref 2.5–4.5)
PLATELET # BLD AUTO: 171 K/UL — SIGNIFICANT CHANGE UP (ref 150–400)
PMV BLD: 10.9 FL — SIGNIFICANT CHANGE UP (ref 7–13)
POTASSIUM SERPL-MCNC: 3.6 MMOL/L — SIGNIFICANT CHANGE UP (ref 3.5–5.3)
POTASSIUM SERPL-SCNC: 3.6 MMOL/L — SIGNIFICANT CHANGE UP (ref 3.5–5.3)
PROTHROM AB SERPL-ACNC: 66.4 SEC — HIGH (ref 9.8–13.1)
RBC # BLD: 3.43 M/UL — LOW (ref 4.2–5.8)
RBC # FLD: 17.3 % — HIGH (ref 10.3–14.5)
SODIUM SERPL-SCNC: 137 MMOL/L — SIGNIFICANT CHANGE UP (ref 135–145)
WBC # BLD: 11.98 K/UL — HIGH (ref 3.8–10.5)
WBC # FLD AUTO: 11.98 K/UL — HIGH (ref 3.8–10.5)

## 2017-05-28 RX ADMIN — MIDODRINE HYDROCHLORIDE 30 MILLIGRAM(S): 2.5 TABLET ORAL at 22:22

## 2017-05-28 RX ADMIN — SEVELAMER CARBONATE 800 MILLIGRAM(S): 2400 POWDER, FOR SUSPENSION ORAL at 09:45

## 2017-05-28 RX ADMIN — Medication 20.85 MICROGRAM(S)/KG/MIN: at 12:38

## 2017-05-28 RX ADMIN — SEVELAMER CARBONATE 800 MILLIGRAM(S): 2400 POWDER, FOR SUSPENSION ORAL at 12:36

## 2017-05-28 RX ADMIN — MIDODRINE HYDROCHLORIDE 30 MILLIGRAM(S): 2.5 TABLET ORAL at 14:49

## 2017-05-28 RX ADMIN — FINASTERIDE 5 MILLIGRAM(S): 5 TABLET, FILM COATED ORAL at 12:37

## 2017-05-28 RX ADMIN — Medication 75 MICROGRAM(S): at 05:03

## 2017-05-28 RX ADMIN — AMIODARONE HYDROCHLORIDE 200 MILLIGRAM(S): 400 TABLET ORAL at 05:03

## 2017-05-28 RX ADMIN — Medication 1 DROP(S): at 22:22

## 2017-05-28 RX ADMIN — MIDODRINE HYDROCHLORIDE 30 MILLIGRAM(S): 2.5 TABLET ORAL at 05:03

## 2017-05-28 RX ADMIN — ATORVASTATIN CALCIUM 20 MILLIGRAM(S): 80 TABLET, FILM COATED ORAL at 22:22

## 2017-05-28 RX ADMIN — Medication 1 DROP(S): at 05:03

## 2017-05-28 NOTE — PROGRESS NOTE ADULT - SUBJECTIVE AND OBJECTIVE BOX
Progress Note:   · Provider Specialty	Internal Medicine	      · Subjective and Objective: 	    CHIEF COMPLAINT:Patient is a 62y old  Male who presents with a chief complaint of sob (09 May 2017 15:32)  pt on bipap  lethargic arousable      	        PAST MEDICAL & SURGICAL HISTORY:  Chronic hypotension  AF (atrial fibrillation)  COPD (chronic obstructive pulmonary disease): 4L home O2  HLD (hyperlipidemia)  DM (diabetes mellitus)  ESRD (end stage renal disease) on dialysis  BPH (benign prostatic hypertrophy)  Myocardial infarction: 10/2011  Chronic renal insufficiency  Gout  Dyslipidemia  Diabetes mellitus  CHF (congestive heart failure)  AICD (automatic cardioverter/defibrillator) present: Biot[employer] - placed 9/11/09  H/O coronary angiogram    REVIEW OF SYSTEMS:  CONSTITUTIONAL: No fever, weight loss, or fatigue  EYES: No eye pain, visual disturbances, or discharge  NECK: No pain or stiffness  RESPIRATORY: No cough, wheezing, chills or hemoptysis;==S HORTNESS OF BREATH   CARDIOVASCULAR: No chest pain, palpitations, passing out, dizziness, or leg swelling  GASTROINTESTINAL: No abdominal or epigastric pain. No nausea, vomiting, or hematemesis; No diarrhea or constipation. No melena or hematochezia.  GENITOURINARY: No dysuria, frequency, hematuria, or incontinence  NEUROLOGICAL: No headaches, memory loss, loss of strength, numbness, or tremors  SKIN: No itching, burning, rashes, or lesions   LYMPH Nodes: No enlarged glands  ENDOCRINE: No heat or cold intolerance; No hair loss  MUSCULOSKELETAL: No joint pain or swelling; No muscle, back, or extremity pain lower ext swelling      MEDICATIONS  (STANDING):  finasteride 5milliGRAM(s) Oral daily  amiodarone    Tablet 200milliGRAM(s) Oral daily  atorvastatin 20milliGRAM(s) Oral at bedtime  docusate sodium 100milliGRAM(s) Oral daily  midodrine 30milliGRAM(s) Oral every 8 hours  sevelamer hydrochloride 800milliGRAM(s) Oral three times a day with meals  levothyroxine 75MICROGram(s) Oral daily  insulin lispro (HumaLOG) corrective regimen sliding scale  SubCutaneous three times a day before meals  insulin lispro (HumaLOG) corrective regimen sliding scale  SubCutaneous at bedtime  dextrose 5%. 1000milliLiter(s) IV Continuous <Continuous>  dextrose 50% Injectable 12.5Gram(s) IV Push once  dextrose 50% Injectable 25Gram(s) IV Push once  dextrose 50% Injectable 25Gram(s) IV Push once  DOBUTamine Infusion 7MICROgram(s)/kG/Min IV Continuous <Continuous>  epoetin alba Injectable 2000Unit(s) IV Push <User Schedule>  acetaminophen   Tablet. 650milliGRAM(s) Oral once    MEDICATIONS  (PRN):  dextrose Gel 1Dose(s) Oral once PRN Blood Glucose LESS THAN 70 milliGRAM(s)/deciliter  glucagon  Injectable 1milliGRAM(s) IntraMuscular once PRN Glucose LESS THAN 70 milligrams/deciliter  acetaminophen   Tablet 650milliGRAM(s) Oral every 6 hours PRN For Temp greater than 38 C (100.4 F)    	  HYSICAL EXAM:  T(C): 36.8, Max: 36.9 (05-26 @ 14:06)  HR: 87 (84 - 92)  BP: 84/53 (84/53 - 139/78)  RR: 18 (18 - 21)  SpO2: 100% (98% - 100%)  Wt(kg): --  I&O's Summary    I & Os for current day (as of 27 May 2017 11:26)  =============================================  IN: 1440 ml / OUT: 3200 ml / NET: -1760 ml      Appearance: Normal	  HEENT:   Normal oral mucosa, PERRL, EOMI	  Lymphatic: No lymphadenopathy  Cardiovascular: Normal S1 S2, No JVD, No murmurs, No edema  Respiratory: Lungs clear to auscultation	  Psychiatry: A & O x 3, Mood & affect appropriate  Gastrointestinal:  Soft, Non-tender, + BS	  Skin: No rashes, No ecchymoses, No cyanosis	  Neurologic: Non-focal    Labs reviewed.    63 yo M with acute on chronic severe systolic CHF (EF 19%), ESRD, DM2, A fib:  - Progressive SOB - on BiPAP, cxr w/ chf , possibly chest CT to eval for HCAP on top of CHF in view of worsening SOB and slight rise in WBC  - Acute on chronic systolic CHF - continue inotropic support as per Cardio, HD for maximum fluid removal  - ESRD - continue HD as per Renal, continue Midodrine  - A fib - Coumadin, continue with Amio  - DM2 - controlled, continue with ISS  - Lt arm swelling - improving, no DVT  - Prognosis very poor, however, DNR denied by pt and family. Outpt HD at schedule required by pt not possible as per Renal, will follow with Palliative service.f/u  pt family not interested in hospice or palliative but pt end stage w/ no alternative tx which would help  spoke w/ family at bedside

## 2017-05-29 DIAGNOSIS — R50.9 FEVER, UNSPECIFIED: ICD-10-CM

## 2017-05-29 DIAGNOSIS — R56.9 UNSPECIFIED CONVULSIONS: ICD-10-CM

## 2017-05-29 LAB
ALBUMIN SERPL ELPH-MCNC: 2.4 G/DL — LOW (ref 3.3–5)
ALP SERPL-CCNC: 161 U/L — HIGH (ref 40–120)
ALT FLD-CCNC: 12 U/L — SIGNIFICANT CHANGE UP (ref 4–41)
APTT BLD: 72.2 SEC — HIGH (ref 27.5–37.4)
AST SERPL-CCNC: 29 U/L — SIGNIFICANT CHANGE UP (ref 4–40)
B PERT DNA SPEC QL NAA+PROBE: SIGNIFICANT CHANGE UP
BASE EXCESS BLDA CALC-SCNC: 2.6 MMOL/L — SIGNIFICANT CHANGE UP
BASOPHILS # BLD AUTO: 0.02 K/UL — SIGNIFICANT CHANGE UP (ref 0–0.2)
BASOPHILS NFR BLD AUTO: 0.2 % — SIGNIFICANT CHANGE UP (ref 0–2)
BILIRUB SERPL-MCNC: 2 MG/DL — HIGH (ref 0.2–1.2)
BUN SERPL-MCNC: 26 MG/DL — HIGH (ref 7–23)
BUN SERPL-MCNC: 26 MG/DL — HIGH (ref 7–23)
C PNEUM DNA SPEC QL NAA+PROBE: NOT DETECTED — SIGNIFICANT CHANGE UP
CA-I BLDA-SCNC: 1.21 MMOL/L — SIGNIFICANT CHANGE UP (ref 1.15–1.29)
CALCIUM SERPL-MCNC: 9.3 MG/DL — SIGNIFICANT CHANGE UP (ref 8.4–10.5)
CALCIUM SERPL-MCNC: 9.3 MG/DL — SIGNIFICANT CHANGE UP (ref 8.4–10.5)
CHLORIDE SERPL-SCNC: 97 MMOL/L — LOW (ref 98–107)
CHLORIDE SERPL-SCNC: 97 MMOL/L — LOW (ref 98–107)
CO2 SERPL-SCNC: 25 MMOL/L — SIGNIFICANT CHANGE UP (ref 22–31)
CO2 SERPL-SCNC: 25 MMOL/L — SIGNIFICANT CHANGE UP (ref 22–31)
CREAT SERPL-MCNC: 6.37 MG/DL — HIGH (ref 0.5–1.3)
CREAT SERPL-MCNC: 6.37 MG/DL — HIGH (ref 0.5–1.3)
EOSINOPHIL # BLD AUTO: 0.05 K/UL — SIGNIFICANT CHANGE UP (ref 0–0.5)
EOSINOPHIL NFR BLD AUTO: 0.5 % — SIGNIFICANT CHANGE UP (ref 0–6)
FLUAV H1 2009 PAND RNA SPEC QL NAA+PROBE: NOT DETECTED — SIGNIFICANT CHANGE UP
FLUAV H1 RNA SPEC QL NAA+PROBE: NOT DETECTED — SIGNIFICANT CHANGE UP
FLUAV H3 RNA SPEC QL NAA+PROBE: NOT DETECTED — SIGNIFICANT CHANGE UP
FLUAV SUBTYP SPEC NAA+PROBE: SIGNIFICANT CHANGE UP
FLUBV RNA SPEC QL NAA+PROBE: NOT DETECTED — SIGNIFICANT CHANGE UP
GLUCOSE BLDA-MCNC: 184 MG/DL — HIGH (ref 70–99)
GLUCOSE SERPL-MCNC: 172 MG/DL — HIGH (ref 70–99)
GLUCOSE SERPL-MCNC: 172 MG/DL — HIGH (ref 70–99)
HADV DNA SPEC QL NAA+PROBE: NOT DETECTED — SIGNIFICANT CHANGE UP
HCO3 BLDA-SCNC: 27 MMOL/L — HIGH (ref 22–26)
HCOV 229E RNA SPEC QL NAA+PROBE: NOT DETECTED — SIGNIFICANT CHANGE UP
HCOV HKU1 RNA SPEC QL NAA+PROBE: NOT DETECTED — SIGNIFICANT CHANGE UP
HCOV NL63 RNA SPEC QL NAA+PROBE: NOT DETECTED — SIGNIFICANT CHANGE UP
HCOV OC43 RNA SPEC QL NAA+PROBE: NOT DETECTED — SIGNIFICANT CHANGE UP
HCT VFR BLD CALC: 30.4 % — LOW (ref 39–50)
HCT VFR BLD CALC: 30.4 % — LOW (ref 39–50)
HCT VFR BLDA CALC: 28.1 % — LOW (ref 39–51)
HGB BLD-MCNC: 9.2 G/DL — LOW (ref 13–17)
HGB BLD-MCNC: 9.2 G/DL — LOW (ref 13–17)
HGB BLDA-MCNC: 9.1 G/DL — LOW (ref 13–17)
HMPV RNA SPEC QL NAA+PROBE: NOT DETECTED — SIGNIFICANT CHANGE UP
HPIV1 RNA SPEC QL NAA+PROBE: NOT DETECTED — SIGNIFICANT CHANGE UP
HPIV2 RNA SPEC QL NAA+PROBE: NOT DETECTED — SIGNIFICANT CHANGE UP
HPIV3 RNA SPEC QL NAA+PROBE: NOT DETECTED — SIGNIFICANT CHANGE UP
HPIV4 RNA SPEC QL NAA+PROBE: NOT DETECTED — SIGNIFICANT CHANGE UP
IMM GRANULOCYTES NFR BLD AUTO: 0.4 % — SIGNIFICANT CHANGE UP (ref 0–1.5)
INR BLD: 5.54 — CRITICAL HIGH (ref 0.88–1.17)
LACTATE BLDA-SCNC: 1.5 MMOL/L — SIGNIFICANT CHANGE UP (ref 0.5–2)
LYMPHOCYTES # BLD AUTO: 0.99 K/UL — LOW (ref 1–3.3)
LYMPHOCYTES # BLD AUTO: 9.1 % — LOW (ref 13–44)
M PNEUMO DNA SPEC QL NAA+PROBE: NOT DETECTED — SIGNIFICANT CHANGE UP
MAGNESIUM SERPL-MCNC: 2 MG/DL — SIGNIFICANT CHANGE UP (ref 1.6–2.6)
MCHC RBC-ENTMCNC: 27.9 PG — SIGNIFICANT CHANGE UP (ref 27–34)
MCHC RBC-ENTMCNC: 27.9 PG — SIGNIFICANT CHANGE UP (ref 27–34)
MCHC RBC-ENTMCNC: 30.3 % — LOW (ref 32–36)
MCHC RBC-ENTMCNC: 30.3 % — LOW (ref 32–36)
MCV RBC AUTO: 92.1 FL — SIGNIFICANT CHANGE UP (ref 80–100)
MCV RBC AUTO: 92.1 FL — SIGNIFICANT CHANGE UP (ref 80–100)
MONOCYTES # BLD AUTO: 1.36 K/UL — HIGH (ref 0–0.9)
MONOCYTES NFR BLD AUTO: 12.5 % — SIGNIFICANT CHANGE UP (ref 2–14)
NEUTROPHILS # BLD AUTO: 8.41 K/UL — HIGH (ref 1.8–7.4)
NEUTROPHILS NFR BLD AUTO: 77.3 % — HIGH (ref 43–77)
PCO2 BLDA: 40 MMHG — SIGNIFICANT CHANGE UP (ref 35–48)
PH BLDA: 7.43 PH — SIGNIFICANT CHANGE UP (ref 7.35–7.45)
PHOSPHATE SERPL-MCNC: 2.3 MG/DL — LOW (ref 2.5–4.5)
PLATELET # BLD AUTO: 164 K/UL — SIGNIFICANT CHANGE UP (ref 150–400)
PLATELET # BLD AUTO: 164 K/UL — SIGNIFICANT CHANGE UP (ref 150–400)
PMV BLD: 10.8 FL — SIGNIFICANT CHANGE UP (ref 7–13)
PMV BLD: 10.8 FL — SIGNIFICANT CHANGE UP (ref 7–13)
PO2 BLDA: 134 MMHG — HIGH (ref 83–108)
POTASSIUM BLDA-SCNC: 3.5 MMOL/L — SIGNIFICANT CHANGE UP (ref 3.4–4.5)
POTASSIUM SERPL-MCNC: 4 MMOL/L — SIGNIFICANT CHANGE UP (ref 3.5–5.3)
POTASSIUM SERPL-MCNC: 4 MMOL/L — SIGNIFICANT CHANGE UP (ref 3.5–5.3)
POTASSIUM SERPL-SCNC: 4 MMOL/L — SIGNIFICANT CHANGE UP (ref 3.5–5.3)
POTASSIUM SERPL-SCNC: 4 MMOL/L — SIGNIFICANT CHANGE UP (ref 3.5–5.3)
PROT SERPL-MCNC: 6.8 G/DL — SIGNIFICANT CHANGE UP (ref 6–8.3)
PROTHROM AB SERPL-ACNC: 64.2 SEC — HIGH (ref 9.8–13.1)
RBC # BLD: 3.3 M/UL — LOW (ref 4.2–5.8)
RBC # BLD: 3.3 M/UL — LOW (ref 4.2–5.8)
RBC # FLD: 17.2 % — HIGH (ref 10.3–14.5)
RBC # FLD: 17.2 % — HIGH (ref 10.3–14.5)
RSV RNA SPEC QL NAA+PROBE: NOT DETECTED — SIGNIFICANT CHANGE UP
RV+EV RNA SPEC QL NAA+PROBE: NOT DETECTED — SIGNIFICANT CHANGE UP
SAO2 % BLDA: 99.3 % — HIGH (ref 95–99)
SODIUM BLDA-SCNC: 131 MMOL/L — LOW (ref 136–146)
SODIUM SERPL-SCNC: 137 MMOL/L — SIGNIFICANT CHANGE UP (ref 135–145)
SODIUM SERPL-SCNC: 137 MMOL/L — SIGNIFICANT CHANGE UP (ref 135–145)
VANCOMYCIN FLD-MCNC: 11.5 UG/ML — SIGNIFICANT CHANGE UP
WBC # BLD: 10.87 K/UL — HIGH (ref 3.8–10.5)
WBC # BLD: 10.87 K/UL — HIGH (ref 3.8–10.5)
WBC # FLD AUTO: 10.87 K/UL — HIGH (ref 3.8–10.5)
WBC # FLD AUTO: 10.87 K/UL — HIGH (ref 3.8–10.5)

## 2017-05-29 PROCEDURE — 71010: CPT | Mod: 26

## 2017-05-29 PROCEDURE — 99222 1ST HOSP IP/OBS MODERATE 55: CPT | Mod: GC

## 2017-05-29 RX ORDER — AZITHROMYCIN 500 MG/1
500 TABLET, FILM COATED ORAL ONCE
Qty: 0 | Refills: 0 | Status: COMPLETED | OUTPATIENT
Start: 2017-05-29 | End: 2017-05-29

## 2017-05-29 RX ORDER — PIPERACILLIN AND TAZOBACTAM 4; .5 G/20ML; G/20ML
2.25 INJECTION, POWDER, LYOPHILIZED, FOR SOLUTION INTRAVENOUS ONCE
Qty: 0 | Refills: 0 | Status: DISCONTINUED | OUTPATIENT
Start: 2017-05-29 | End: 2017-05-29

## 2017-05-29 RX ORDER — SODIUM CHLORIDE 0.65 %
1 AEROSOL, SPRAY (ML) NASAL
Qty: 0 | Refills: 0 | Status: DISCONTINUED | OUTPATIENT
Start: 2017-05-29 | End: 2017-06-23

## 2017-05-29 RX ORDER — VANCOMYCIN HCL 1 G
1250 VIAL (EA) INTRAVENOUS ONCE
Qty: 0 | Refills: 0 | Status: COMPLETED | OUTPATIENT
Start: 2017-05-29 | End: 2017-05-29

## 2017-05-29 RX ORDER — PIPERACILLIN AND TAZOBACTAM 4; .5 G/20ML; G/20ML
2.25 INJECTION, POWDER, LYOPHILIZED, FOR SOLUTION INTRAVENOUS EVERY 8 HOURS
Qty: 0 | Refills: 0 | Status: DISCONTINUED | OUTPATIENT
Start: 2017-05-29 | End: 2017-05-29

## 2017-05-29 RX ORDER — VANCOMYCIN HCL 1 G
VIAL (EA) INTRAVENOUS
Qty: 0 | Refills: 0 | Status: DISCONTINUED | OUTPATIENT
Start: 2017-05-29 | End: 2017-05-29

## 2017-05-29 RX ORDER — VANCOMYCIN HCL 1 G
1500 VIAL (EA) INTRAVENOUS ONCE
Qty: 0 | Refills: 0 | Status: DISCONTINUED | OUTPATIENT
Start: 2017-05-29 | End: 2017-05-29

## 2017-05-29 RX ORDER — METRONIDAZOLE 500 MG
500 TABLET ORAL EVERY 12 HOURS
Qty: 0 | Refills: 0 | Status: DISCONTINUED | OUTPATIENT
Start: 2017-05-29 | End: 2017-06-03

## 2017-05-29 RX ORDER — IPRATROPIUM/ALBUTEROL SULFATE 18-103MCG
3 AEROSOL WITH ADAPTER (GRAM) INHALATION ONCE
Qty: 0 | Refills: 0 | Status: COMPLETED | OUTPATIENT
Start: 2017-05-29 | End: 2017-05-29

## 2017-05-29 RX ORDER — PIPERACILLIN AND TAZOBACTAM 4; .5 G/20ML; G/20ML
3.38 INJECTION, POWDER, LYOPHILIZED, FOR SOLUTION INTRAVENOUS ONCE
Qty: 0 | Refills: 0 | Status: COMPLETED | OUTPATIENT
Start: 2017-05-29 | End: 2017-05-29

## 2017-05-29 RX ORDER — AZITHROMYCIN 500 MG/1
TABLET, FILM COATED ORAL
Qty: 0 | Refills: 0 | Status: DISCONTINUED | OUTPATIENT
Start: 2017-05-29 | End: 2017-05-29

## 2017-05-29 RX ORDER — CEFEPIME 1 G/1
1000 INJECTION, POWDER, FOR SOLUTION INTRAMUSCULAR; INTRAVENOUS EVERY 24 HOURS
Qty: 0 | Refills: 0 | Status: DISCONTINUED | OUTPATIENT
Start: 2017-05-29 | End: 2017-06-03

## 2017-05-29 RX ORDER — VANCOMYCIN HCL 1 G
1500 VIAL (EA) INTRAVENOUS ONCE
Qty: 0 | Refills: 0 | Status: COMPLETED | OUTPATIENT
Start: 2017-05-29 | End: 2017-05-29

## 2017-05-29 RX ORDER — ERYTHROPOIETIN 10000 [IU]/ML
4000 INJECTION, SOLUTION INTRAVENOUS; SUBCUTANEOUS
Qty: 0 | Refills: 0 | Status: DISCONTINUED | OUTPATIENT
Start: 2017-05-29 | End: 2017-06-02

## 2017-05-29 RX ORDER — PIPERACILLIN AND TAZOBACTAM 4; .5 G/20ML; G/20ML
3.38 INJECTION, POWDER, LYOPHILIZED, FOR SOLUTION INTRAVENOUS EVERY 12 HOURS
Qty: 0 | Refills: 0 | Status: DISCONTINUED | OUTPATIENT
Start: 2017-05-29 | End: 2017-05-29

## 2017-05-29 RX ADMIN — Medication 1 SPRAY(S): at 06:18

## 2017-05-29 RX ADMIN — Medication 75 MICROGRAM(S): at 04:19

## 2017-05-29 RX ADMIN — CEFEPIME 100 MILLIGRAM(S): 1 INJECTION, POWDER, FOR SOLUTION INTRAMUSCULAR; INTRAVENOUS at 20:28

## 2017-05-29 RX ADMIN — ERYTHROPOIETIN 4000 UNIT(S): 10000 INJECTION, SOLUTION INTRAVENOUS; SUBCUTANEOUS at 16:13

## 2017-05-29 RX ADMIN — Medication 1 SPRAY(S): at 22:18

## 2017-05-29 RX ADMIN — MIDODRINE HYDROCHLORIDE 30 MILLIGRAM(S): 2.5 TABLET ORAL at 14:28

## 2017-05-29 RX ADMIN — Medication 1 DROP(S): at 22:18

## 2017-05-29 RX ADMIN — Medication: at 09:56

## 2017-05-29 RX ADMIN — FINASTERIDE 5 MILLIGRAM(S): 5 TABLET, FILM COATED ORAL at 14:36

## 2017-05-29 RX ADMIN — AMIODARONE HYDROCHLORIDE 200 MILLIGRAM(S): 400 TABLET ORAL at 04:19

## 2017-05-29 RX ADMIN — ATORVASTATIN CALCIUM 20 MILLIGRAM(S): 80 TABLET, FILM COATED ORAL at 22:18

## 2017-05-29 RX ADMIN — Medication 300 MILLIGRAM(S): at 21:06

## 2017-05-29 RX ADMIN — Medication 650 MILLIGRAM(S): at 02:45

## 2017-05-29 RX ADMIN — Medication 166.67 MILLIGRAM(S): at 06:20

## 2017-05-29 RX ADMIN — MIDODRINE HYDROCHLORIDE 30 MILLIGRAM(S): 2.5 TABLET ORAL at 22:18

## 2017-05-29 RX ADMIN — Medication 3 MILLILITER(S): at 02:02

## 2017-05-29 RX ADMIN — Medication 500 MILLIGRAM(S): at 22:18

## 2017-05-29 RX ADMIN — MIDODRINE HYDROCHLORIDE 30 MILLIGRAM(S): 2.5 TABLET ORAL at 04:19

## 2017-05-29 RX ADMIN — AZITHROMYCIN 250 MILLIGRAM(S): 500 TABLET, FILM COATED ORAL at 04:18

## 2017-05-29 RX ADMIN — PIPERACILLIN AND TAZOBACTAM 200 GRAM(S): 4; .5 INJECTION, POWDER, LYOPHILIZED, FOR SOLUTION INTRAVENOUS at 05:01

## 2017-05-29 NOTE — PROGRESS NOTE ADULT - SUBJECTIVE AND OBJECTIVE BOX
CHIEF COMPLAINT:Patient is a 62y old  Male who presents with a chief complaint of SOB due to terminal heart failure, now with HCAP, on BiPAP      No Known Allergies      PAST MEDICAL & SURGICAL HISTORY:  Chronic hypotension  AF (atrial fibrillation)  COPD (chronic obstructive pulmonary disease): 4L home O2  HLD (hyperlipidemia)  DM (diabetes mellitus)  ESRD (end stage renal disease) on dialysis  BPH (benign prostatic hypertrophy)  Myocardial infarction: 10/2011  Chronic renal insufficiency  Gout  Dyslipidemia  Diabetes mellitus  CHF (congestive heart failure)  AICD (automatic cardioverter/defibrillator) present: Biot[employer] - placed 9/11/09  H/O coronary angiogram      FAMILY HISTORY:  No pertinent family history in first degree relatives      REVIEW OF SYSTEMS:  CONSTITUTIONAL: No fever, weight loss, or fatigue  EYES: No eye pain, visual disturbances, or discharge  NECK: No pain or stiffness  RESPIRATORY: Cough with green sputum, increased SOB, no wheezing  CARDIOVASCULAR: No chest pain, palpitations, passing out, dizziness, or leg swelling  GASTROINTESTINAL: No abdominal or epigastric pain. No nausea, vomiting, or hematemesis; No diarrhea or constipation. No melena or hematochezia.  GENITOURINARY: No dysuria, frequency, hematuria, or incontinence  NEUROLOGICAL: No headaches, memory loss, loss of strength, numbness, or tremors  SKIN: No itching, burning, rashes, or lesions   LYMPH Nodes: No enlarged glands  ENDOCRINE: No heat or cold intolerance; No hair loss  MUSCULOSKELETAL: No joint pain or swelling; No muscle, back, or extremity pain    Medications:  MEDICATIONS  (STANDING):  finasteride 5milliGRAM(s) Oral daily  amiodarone    Tablet 200milliGRAM(s) Oral daily  atorvastatin 20milliGRAM(s) Oral at bedtime  docusate sodium 100milliGRAM(s) Oral daily  midodrine 30milliGRAM(s) Oral every 8 hours  sevelamer hydrochloride 800milliGRAM(s) Oral three times a day with meals  levothyroxine 75MICROGram(s) Oral daily  insulin lispro (HumaLOG) corrective regimen sliding scale  SubCutaneous three times a day before meals  insulin lispro (HumaLOG) corrective regimen sliding scale  SubCutaneous at bedtime  dextrose 5%. 1000milliLiter(s) IV Continuous <Continuous>  dextrose 50% Injectable 12.5Gram(s) IV Push once  dextrose 50% Injectable 25Gram(s) IV Push once  dextrose 50% Injectable 25Gram(s) IV Push once  DOBUTamine Infusion 7MICROgram(s)/kG/Min IV Continuous <Continuous>  acetaminophen   Tablet. 650milliGRAM(s) Oral once  epoetin alba Injectable 4000Unit(s) IV Push <User Schedule>  cefepime  IVPB 1000milliGRAM(s) IV Intermittent every 24 hours  metroNIDAZOLE    Tablet 500milliGRAM(s) Oral every 12 hours    MEDICATIONS  (PRN):  dextrose Gel 1Dose(s) Oral once PRN Blood Glucose LESS THAN 70 milliGRAM(s)/deciliter  glucagon  Injectable 1milliGRAM(s) IntraMuscular once PRN Glucose LESS THAN 70 milligrams/deciliter  acetaminophen   Tablet 650milliGRAM(s) Oral every 6 hours PRN For Temp greater than 38 C (100.4 F)  acetaminophen    Suspension. 650milliGRAM(s) Oral every 6 hours PRN Moderate Pain (4 - 6)  artificial tears (preservative free) Ophthalmic Solution 1Drop(s) Both EYES three times a day PRN Dry Eyes  sodium chloride 0.65% Nasal 1Spray(s) Both Nostrils four times a day PRN Nasal Congestion    	    PHYSICAL EXAM:  T(C): 36.3, Max: 38.6 (05-29 @ 02:45)  HR: 97 (80 - 108)  BP: 75/49 (64/32 - 133/71)  RR: 17 (16 - 18)  SpO2: 100% (75% - 100%)  Wt(kg): --  I&O's Summary      Appearance: Normal	  HEENT:   Normal oral mucosa, PERRL, EOMI	  Lymphatic: No lymphadenopathy  Cardiovascular: Normal S1 S2, No JVD, No murmurs, No edema  Respiratory: Lungs clear to auscultation	  Psychiatry: A & O x 3, Mood & affect appropriate  Gastrointestinal:  Soft, Non-tender, + BS	  Skin: No rashes, No ecchymoses, No cyanosis	  Neurologic: Non-focal  Extremities: Normal range of motion, No clubbing, cyanosis or edema  Vascular: Peripheral pulses palpable 2+ bilaterally                                9.2    10.87 )-----------( 164      ( 29 May 2017 03:50 )             30.4     05-29    137  |  97<L>  |  26<H>  ----------------------------<  172<H>  4.0   |  25  |  6.37<H>    Ca    9.3      29 May 2017 03:50  Phos  2.3     05-29  Mg     2.0     05-29    TPro  6.8  /  Alb  2.4<L>  /  TBili  2.0<H>  /  DBili  x   /  AST  29  /  ALT  12  /  AlkPhos  161<H>  05-29

## 2017-05-29 NOTE — PROGRESS NOTE ADULT - ASSESSMENT
63 yo M with acute on chronic severe systolic CHF (EF 19%), ESRD, DM2, A fib:  - HCAP - continue antibiotics as per ID, continue BiPAP, no need for CT as diagnosis is evident clinically  - Acute on chronic systolic CHF - continue inotropic support as per Cardio, HD for maximum fluid removal  - ESRD - continue HD as per Renal, continue Midodrine  - A fib - Coumadin, continue with Amio  - DM2 - controlled, continue with ISS  - Lt arm swelling - improving, no DVT  - Prognosis very poor, explained to patient and family, however desires all possible treatment options including CPR/intubation, will follow with palliative.

## 2017-05-29 NOTE — CONSULT NOTE ADULT - SUBJECTIVE AND OBJECTIVE BOX
HPI:  63 y/o male with a PMHx of NICM with severe LV dysfunction (EF 19%) S/P Biotronic ICD placement on Dobutamine gtt via chronic left upper extremity PICC line, ESRD on HD M/W/F, atrial fibrillation on Coumadin, COPD on 4L home O2, HLD, DM, chronic hypotension on Midodrine, presents to ED S/P syncopal episode. Pt lives with his wife who is his primary caregiver and has a visiting nurse that comes weekly to change the dressing of his PICC line; he ambulates with a walker.       Since admission pt has been teated for renal failure through dialysis, and CHF with Dobutamine with a severely poor EF  He was not febrile on presentation, and no white count  But over the last few days has started developing fever with a sanam 101.9 today and mild fevers started on the 24th and white count also started a few days ago.   Has AICD, PICC LINE AND R IJ   XRAY - B/L pathology suggestive of fluid overload ve pneumonia requiring bipap a        PAST MEDICAL & SURGICAL HISTORY:  Chronic hypotension  AF (atrial fibrillation)  COPD (chronic obstructive pulmonary disease): 4L home O2  HLD (hyperlipidemia)  DM (diabetes mellitus)  ESRD (end stage renal disease) on dialysis  BPH (benign prostatic hypertrophy)  Myocardial infarction: 10/2011  Chronic renal insufficiency  Gout  Dyslipidemia  Diabetes mellitus  CHF (congestive heart failure)  AICD (automatic cardioverter/defibrillator) present: Biotronic - placed 9/11/09  H/O coronary angiogram      Allergies  No Known Allergies        ANTIMICROBIALS:  azithromycin  IVPB    piperacillin/tazobactam IVPB. 3.375 every 12 hours      OTHER MEDS:  finasteride 5milliGRAM(s) Oral daily  amiodarone    Tablet 200milliGRAM(s) Oral daily  atorvastatin 20milliGRAM(s) Oral at bedtime  docusate sodium 100milliGRAM(s) Oral daily  midodrine 30milliGRAM(s) Oral every 8 hours  sevelamer hydrochloride 800milliGRAM(s) Oral three times a day with meals  levothyroxine 75MICROGram(s) Oral daily  insulin lispro (HumaLOG) corrective regimen sliding scale  SubCutaneous three times a day before meals  insulin lispro (HumaLOG) corrective regimen sliding scale  SubCutaneous at bedtime  dextrose 5%. 1000milliLiter(s) IV Continuous <Continuous>  dextrose Gel 1Dose(s) Oral once PRN  dextrose 50% Injectable 12.5Gram(s) IV Push once  dextrose 50% Injectable 25Gram(s) IV Push once  dextrose 50% Injectable 25Gram(s) IV Push once  glucagon  Injectable 1milliGRAM(s) IntraMuscular once PRN  DOBUTamine Infusion 7MICROgram(s)/kG/Min IV Continuous <Continuous>  acetaminophen   Tablet 650milliGRAM(s) Oral every 6 hours PRN  epoetin alba Injectable 2000Unit(s) IV Push <User Schedule>  acetaminophen   Tablet. 650milliGRAM(s) Oral once  acetaminophen    Suspension. 650milliGRAM(s) Oral every 6 hours PRN  artificial tears (preservative free) Ophthalmic Solution 1Drop(s) Both EYES three times a day PRN  sodium chloride 0.65% Nasal 1Spray(s) Both Nostrils four times a day PRN      SOCIAL HISTORY: [ ] etoh [ ] tobacco [ ] former smoker [ ] IVDU    FAMILY HISTORY:  No pertinent family history in first degree relatives      REVIEW OF SYSTEMS  [  ] ROS unobtainable because:    [  ] All other systems negative except as noted below:	    Constitutional: [ ] fever [ ]chills [ ] sweats [ ] fatigue [ ] weight loss [ ]  Skin:  [ ] rash [ ] phlebitis	  Eyes: [ ] icterus [ ] inflammation	  ENMT: [ ] discharge [ ] thrush [ ] ulcers [ ] exudates  Respiratory: [ ] dyspnea [ ] hemoptysis [ ] cough [ ] sputum	  Cardiovascular:  [ ] chest pain [ ] palpitations [ ] edema	  Gastrointestinal:  [ ] nausea [ ] vomiting [ ] diarrhea [ ] constipation [ ] pain	  Genitourinary:  [ ] dysuria [ ] frequency [ ] hematuria [ ] discharge [ ] flank pain  Musculoskeletal:  [ ] myalgias [ ] arthralgias [ ] arthritis	  Neurological:  [ ] headache [ ] seizures	  Psychiatric:  [ ] anxiety [ ] depression	  Hematology/Lymphatics:  [ ] lymphadenopathy  Endocrine:  [ ] adrenal [ ] thyroid  Allergic/Immunologic:	 [ ] transplant [ ] seasonal    Vital Signs Last 24 Hrs  T(F): 98, Max: 101.4 (05-29 @ 02:45)  Wt(kg): --    Vital Signs Last 24 Hrs  HR: 89 (80 - 108)  BP: 121/79 (64/32 - 133/71)  RR: 18  SpO2: 100% (75% - 100%)  Wt(kg): --    PHYSICAL EXAM:  General: [ ] non-toxic  HEAD/EYES: [ ] PERRL [ ] white sclera [ ] icterus  ENT:  [ ] normal [ ] supple [ ] thrush [ ] pharyngeal exudate  Cardiovascular:   [ ] murmur [ ] normal [ ] PPM/AICD  Respiratory:  [ ] clear to ausculation bilaterally  GI:  [ ] soft, non-tender, normal bowel sounds  :  [ ] rosa [ ] no CVA tenderness   Musculoskeletal:  [ ] no synovitis  Neurologic:  [ ] non-focal exam   Skin:  [ ] no rash  Lymph: [ ] no lymphadenopathy  Psychiatric:  [ ] appropriate affect [ ] alert & oriented  Lines:  [ ] no phlebitis [ ] central line                                9.2    10.87 )-----------( 164      ( 29 May 2017 03:50 )( white count gradually on the rise )             30.4       05-29    137  |  97<L>  |  26<H>  ----------------------------<  172<H>  4.0   |  25  |  6.37<H>    Ca    9.3      29 May 2017 03:50  Phos  2.3     05-29  Mg     2.0     05-29    TPro  6.8  /  Alb  2.4<L>  /  TBili  2.0<H>  /  DBili  x   /  AST  29  /  ALT  12  /  AlkPhos  161<H>  05-29          MICROBIOLOGY:  BLOOD- 20th no growth, one set  repeat bcx sent  send UA and ucx if sample available               v            RADIOLOGY: HPI:  61 y/o male with a PMHx of NICM with severe LV dysfunction (EF 19%) S/P Biotronic ICD placement on Dobutamine gtt via chronic left upper extremity PICC line, ESRD on HD M/W/F, atrial fibrillation on Coumadin, COPD on 4L home O2, HLD, DM, chronic hypotension on Midodrine, presents to ED S/P syncopal episode. Pt lives with his wife who is his primary caregiver and has a visiting nurse that comes weekly to change the dressing of his PICC line; he ambulates with a walker.       Since admission pt has been teated for renal failure through dialysis, and CHF with Dobutamine with a severely poor EF  He was not febrile on presentation, and no white count  But over the last few days has started developing fever with a sanam 101.9 today and mild fevers started on the 24th and white count also started a few days ago.   Has AICD, PICC LINE AND R IJ   XRAY - B/L pathology suggestive of fluid overload ve pneumonia requiring bipap a        PAST MEDICAL & SURGICAL HISTORY:  Chronic hypotension  AF (atrial fibrillation)  COPD (chronic obstructive pulmonary disease): 4L home O2  HLD (hyperlipidemia)  DM (diabetes mellitus)  ESRD (end stage renal disease) on dialysis  BPH (benign prostatic hypertrophy)  Myocardial infarction: 10/2011  Chronic renal insufficiency  Gout  Dyslipidemia  Diabetes mellitus  CHF (congestive heart failure)  AICD (automatic cardioverter/defibrillator) present: Biotronic - placed 9/11/09  H/O coronary angiogram      Allergies  No Known Allergies        ANTIMICROBIALS:  azithromycin  IVPB    piperacillin/tazobactam IVPB. 3.375 every 12 hours      OTHER MEDS:  finasteride 5milliGRAM(s) Oral daily  amiodarone    Tablet 200milliGRAM(s) Oral daily  atorvastatin 20milliGRAM(s) Oral at bedtime  docusate sodium 100milliGRAM(s) Oral daily  midodrine 30milliGRAM(s) Oral every 8 hours  sevelamer hydrochloride 800milliGRAM(s) Oral three times a day with meals  levothyroxine 75MICROGram(s) Oral daily  insulin lispro (HumaLOG) corrective regimen sliding scale  SubCutaneous three times a day before meals  insulin lispro (HumaLOG) corrective regimen sliding scale  SubCutaneous at bedtime  dextrose 5%. 1000milliLiter(s) IV Continuous <Continuous>  dextrose Gel 1Dose(s) Oral once PRN  dextrose 50% Injectable 12.5Gram(s) IV Push once  dextrose 50% Injectable 25Gram(s) IV Push once  dextrose 50% Injectable 25Gram(s) IV Push once  glucagon  Injectable 1milliGRAM(s) IntraMuscular once PRN  DOBUTamine Infusion 7MICROgram(s)/kG/Min IV Continuous <Continuous>  acetaminophen   Tablet 650milliGRAM(s) Oral every 6 hours PRN  epoetin alba Injectable 2000Unit(s) IV Push <User Schedule>  acetaminophen   Tablet. 650milliGRAM(s) Oral once  acetaminophen    Suspension. 650milliGRAM(s) Oral every 6 hours PRN  artificial tears (preservative free) Ophthalmic Solution 1Drop(s) Both EYES three times a day PRN  sodium chloride 0.65% Nasal 1Spray(s) Both Nostrils four times a day PRN      SOCIAL HISTORY: [ ] etoh [ ] tobacco [ ] former smoker [ ] IVDU    FAMILY HISTORY:  No pertinent family history in first degree relatives      REVIEW OF SYSTEMS  [  ] ROS unobtainable because:    [ x ] All other systems negative except as noted below:	    Constitutional: [x ] fever [ x]chills [ ] sweats [ ] fatigue [ ] weight loss [ ]  Skin: no evidence o line infection   Eyes: [ ] icterus [ ] inflammation	  ENMT: [ ] discharge [ ] thrush [ ] ulcers [ ] exudates  Respiratory: [ ] dyspnea [ ] hemoptysis [ ] cough [ ] sputum	  Cardiovascular:  [ ] chest pain [ ] palpitations [ ] edema	  Gastrointestinal:  [ ] nausea [ ] vomiting [ ] diarrhea [ ] constipation [ ] pain	  Genitourinary:  [ ] dysuria [ ] frequency [ ] hematuria [ ] discharge [ ] flank pain  Musculoskeletal:  [ ] myalgias [ ] arthralgias [ ] arthritis	  Neurological:  [ ] headache [ ] seizures	  Psychiatric:  [ ] anxiety [ ] depression	  Hematology/Lymphatics:  [ ] lymphadenopathy  Endocrine:  [ ] adrenal [ ] thyroid  Allergic/Immunologic:	 [ ] transplant [ ] seasonal    Vital Signs Last 24 Hrs  T(F): 98, Max: 101.4 (05-29 @ 02:45)  Wt(kg): --    Vital Signs Last 24 Hrs  HR: 89 (80 - 108)  BP: 121/79 (64/32 - 133/71)  RR: 18  SpO2: 100% (75% - 100%)  Wt(kg): --    PHYSICAL EXAM:  General: pt is on bipap  HEAD/EYES: [ x] PERRL [x ] white sclera [ ] icterus  ENT:  [x ] normal [ x] supple [ ] thrush [ ] pharyngeal exudate  Cardiovascular:   crackles and no e/o infection around AICD   Respiratory:  CRACKLES   GI:  [X ] soft, non-tender, normal bowel sounds  :  NO rosa, [X ] no CVA tenderness   Musculoskeletal:  [X ] no synovitis  Neurologic:  [ X] non-focal exam   Skin:  [ X] no rash  Lymph: [ X] no lymphadenopathy  Psychiatric:  [X ] appropriate affect [ ] alert & oriented  Lines:  No evidence of infection around aicd, picc                               9.2    10.87 )-----------( 164      ( 29 May 2017 03:50 )( white count gradually on the rise )             30.4       05-29    137  |  97<L>  |  26<H>  ----------------------------<  172<H>  4.0   |  25  |  6.37<H>    Ca    9.3      29 May 2017 03:50  Phos  2.3     05-29  Mg     2.0     05-29    TPro  6.8  /  Alb  2.4<L>  /  TBili  2.0<H>  /  DBili  x   /  AST  29  /  ALT  12  /  AlkPhos  161<H>  05-29          MICROBIOLOGY:  BLOOD- 20th no growth, one set  repeat bcx sent  send UA and ucx if sample available               v            RADIOLOGY: "HPI: 61 y/o male with a PMHx of NICM with severe LV dysfunction (EF 19%) S/P Biotronic ICD placement on Dobutamine gtt via chronic left upper extremity PICC line, ESRD on HD M/W/F, atrial fibrillation on Coumadin, COPD on 4L home O2, HLD, DM, chronic hypotension on Midodrine, presents to ED S/P syncopal episode. Pt lives with his wife who is his primary caregiver and has a visiting nurse that comes weekly to change the dressing of his PICC line; he ambulates with a walker.       Since admission pt has been teated for renal failure through dialysis, and CHF with Dobutamine with a severely poor EF  He was not febrile on presentation, and no white count  But over the last few days has started developing fever with a sanam 101.9 today and mild fevers started on the 24th and white count also started a few days ago.   Has AICD, PICC LINE AND R IJ   XRAY - B/L pathology suggestive of fluid overload ve pneumonia requiring bipap a"    Above reviewed. Patient today feels fatigued and weak. On bipap, feels SOB. No overt chest pain. Has had cough. No N/V/D, no chest pain, has had green sputum.    PAST MEDICAL & SURGICAL HISTORY:  Chronic hypotension  AF (atrial fibrillation)  COPD (chronic obstructive pulmonary disease): 4L home O2  HLD (hyperlipidemia)  DM (diabetes mellitus)  ESRD (end stage renal disease) on dialysis  BPH (benign prostatic hypertrophy)  Myocardial infarction: 10/2011  Chronic renal insufficiency  Gout  Dyslipidemia  Diabetes mellitus  CHF (congestive heart failure)  AICD (automatic cardioverter/defibrillator) present: Biotronic - placed 9/11/09  H/O coronary angiogram  Allergies  No Known Allergies    ANTIMICROBIALS:  azithromycin  IVPB    piperacillin/tazobactam IVPB. 3.375 every 12 hours    OTHER MEDS:  finasteride 5milliGRAM(s) Oral daily  amiodarone    Tablet 200milliGRAM(s) Oral daily  atorvastatin 20milliGRAM(s) Oral at bedtime  docusate sodium 100milliGRAM(s) Oral daily  midodrine 30milliGRAM(s) Oral every 8 hours  sevelamer hydrochloride 800milliGRAM(s) Oral three times a day with meals  levothyroxine 75MICROGram(s) Oral daily  insulin lispro (HumaLOG) corrective regimen sliding scale  SubCutaneous three times a day before meals  insulin lispro (HumaLOG) corrective regimen sliding scale  SubCutaneous at bedtime  dextrose 5%. 1000milliLiter(s) IV Continuous <Continuous>  dextrose Gel 1Dose(s) Oral once PRN  dextrose 50% Injectable 12.5Gram(s) IV Push once  dextrose 50% Injectable 25Gram(s) IV Push once  dextrose 50% Injectable 25Gram(s) IV Push once  glucagon  Injectable 1milliGRAM(s) IntraMuscular once PRN  DOBUTamine Infusion 7MICROgram(s)/kG/Min IV Continuous <Continuous>  acetaminophen   Tablet 650milliGRAM(s) Oral every 6 hours PRN  epoetin alba Injectable 2000Unit(s) IV Push <User Schedule>  acetaminophen   Tablet. 650milliGRAM(s) Oral once  acetaminophen    Suspension. 650milliGRAM(s) Oral every 6 hours PRN  artificial tears (preservative free) Ophthalmic Solution 1Drop(s) Both EYES three times a day PRN  sodium chloride 0.65% Nasal 1Spray(s) Both Nostrils four times a day PRN      SOCIAL HISTORY: No alcohol, no illicit drugs, no tobacc      FAMILY HISTORY:  No pertinent family history in first degree relatives    Vital Signs Last 24 Hrs  T(F): 98, Max: 101.4 (05-29 @ 02:45)  Wt(kg): --    Vital Signs Last 24 Hrs  HR: 89 (80 - 108)  BP: 121/79 (64/32 - 133/71)  RR: 18  SpO2: 100% (75% - 100%)  Wt(kg): --    PHYSICAL EXAM:  General: pt is on bipap  HEAD/EYES: [ x] PERRL [x ] white sclera [ ] icterus  ENT:  [x ] normal [ x] supple [ ] thrush [ ] pharyngeal exudate  Cardiovascular:   crackles and no e/o infection around AICD   Respiratory:  CRACKLES, bipap  GI:  [X ] soft, non-tender, normal bowel sounds  :  NO rosa, [X ] no CVA tenderness   Musculoskeletal:  [X ] no synovitis  Neurologic:  [ X] non-focal exam   Skin:  [ X] no rash  Lymph: [ X] no lymphadenopathy  Psychiatric:  [X ] appropriate affect [ ] alert & oriented  Lines:  No evidence of infection around aicd, picc     Labs:               9.2    10.87 )-----------( 164      ( 29 May 2017 03:50 )( white count gradually on the rise )             30.4       05-29    137  |  97<L>  |  26<H>  ----------------------------<  172<H>  4.0   |  25  |  6.37<H>    Ca    9.3      29 May 2017 03:50  Phos  2.3     05-29  Mg     2.0     05-29    TPro  6.8  /  Alb  2.4<L>  /  TBili  2.0<H>  /  DBili  x   /  AST  29  /  ALT  12  /  AlkPhos  161<H>  05-29    MICROBIOLOGY:    5/29: RVP Neg    5/20: BCX Neg    BCX Pending      RADIOLOGY:    CXR:  FINDINGS:     Cardiac silhouette mildly enlarged. Left-sided single lead defibrillator.   Right-sided central catheter with tip overlying SVC. Patchy lung opacity   similar to the prior study. Small right pleural effusion. No pneumothorax.

## 2017-05-29 NOTE — CONSULT NOTE ADULT - SUBJECTIVE AND OBJECTIVE BOX
KURT [id number] is a 62y old  Male who presents with a chief complaint of sob (09 May 2017 15:32)      HPI:  61 y/o male with a PMHx of NICM with severe LV dysfunction (EF 19%) S/P Biotronic ICD placement on Dobutamine gtt via chronic left upper extremity PICC line, ESRD on HD M/W/F, atrial fibrillation on Coumadin, COPD on 4L home O2, HLD, DM, chronic hypotension on Midodrine, presents to ED S/P syncopal episode. Neuro consulted for episodes of shaking overnight 5/28. Pt was febrile to 101F. Daughter and wife at bedside witnessed 3 episodes of generalized shaking. Per wife he suddenly had b/l UE shaking, eyes fixed forward, no tongue biting no incontinence and was awake during the episode lasting less than a minute. Pt was awake but not responding for about 5 minutes then returned to baseline. This happened again about 30 minutes later. Pt seen by CCU and infectious disease overnight. Found to lung consolidations concerning for either pulmonary edema vs HCAP. Pt started on antibiotics and has been completely at baseline all day. No further fevers. Pt c/o weakness worse on the left side, but b/l, has tingling after dialysis but none currently. No headache, no difficulty with speech.       MEDICATIONS  (STANDING):  finasteride 5milliGRAM(s) Oral daily  amiodarone    Tablet 200milliGRAM(s) Oral daily  atorvastatin 20milliGRAM(s) Oral at bedtime  docusate sodium 100milliGRAM(s) Oral daily  midodrine 30milliGRAM(s) Oral every 8 hours  sevelamer hydrochloride 800milliGRAM(s) Oral three times a day with meals  levothyroxine 75MICROGram(s) Oral daily  insulin lispro (HumaLOG) corrective regimen sliding scale  SubCutaneous three times a day before meals  insulin lispro (HumaLOG) corrective regimen sliding scale  SubCutaneous at bedtime  dextrose 5%. 1000milliLiter(s) IV Continuous <Continuous>  dextrose 50% Injectable 12.5Gram(s) IV Push once  dextrose 50% Injectable 25Gram(s) IV Push once  dextrose 50% Injectable 25Gram(s) IV Push once  DOBUTamine Infusion 7MICROgram(s)/kG/Min IV Continuous <Continuous>  acetaminophen   Tablet. 650milliGRAM(s) Oral once  epoetin alba Injectable 4000Unit(s) IV Push <User Schedule>  cefepime  IVPB 1000milliGRAM(s) IV Intermittent every 24 hours  metroNIDAZOLE    Tablet 500milliGRAM(s) Oral every 12 hours    MEDICATIONS  (PRN):  dextrose Gel 1Dose(s) Oral once PRN Blood Glucose LESS THAN 70 milliGRAM(s)/deciliter  glucagon  Injectable 1milliGRAM(s) IntraMuscular once PRN Glucose LESS THAN 70 milligrams/deciliter  acetaminophen   Tablet 650milliGRAM(s) Oral every 6 hours PRN For Temp greater than 38 C (100.4 F)  acetaminophen    Suspension. 650milliGRAM(s) Oral every 6 hours PRN Moderate Pain (4 - 6)  artificial tears (preservative free) Ophthalmic Solution 1Drop(s) Both EYES three times a day PRN Dry Eyes  sodium chloride 0.65% Nasal 1Spray(s) Both Nostrils four times a day PRN Nasal Congestion    PAST MEDICAL & SURGICAL HISTORY:  Chronic hypotension  AF (atrial fibrillation)  COPD (chronic obstructive pulmonary disease): 4L home O2  HLD (hyperlipidemia)  DM (diabetes mellitus)  ESRD (end stage renal disease) on dialysis  BPH (benign prostatic hypertrophy)  Myocardial infarction: 10/2011  Chronic renal insufficiency  Gout  Dyslipidemia  Diabetes mellitus  CHF (congestive heart failure)  AICD (automatic cardioverter/defibrillator) present: Biotronic - placed 9/11/09  H/O coronary angiogram    FAMILY HISTORY:  No pertinent family history in first degree relatives    Allergies    No Known Allergies    Intolerances        SHx - No smoking, No ETOH, No drug abuse      Review of Systems:  CONSTITUTIONAL:  No weight loss, fever, chills, weakness or fatigue.  HEENT:  Eyes:  No visual loss, blurred vision, double vision or yellow sclerae. Ears, Nose, Throat:  No hearing loss, sneezing, congestion, runny nose or sore throat.  SKIN:  No rash or itching.  CARDIOVASCULAR:  chest pain, discomfort, with cough. No palpitations or edema.  RESPIRATORY:  Shortness of breath and cough.  GASTROINTESTINAL:  No anorexia, nausea, vomiting or diarrhea. No abdominal pain  NEUROLOGICAL:  See HPI  MUSCULOSKELETAL: right groin pain from catheter insertion site.          Vital Signs Last 24 Hrs  T(C): 36.3, Max: 38.6 (05-29 @ 02:45)  T(F): 97.4, Max: 101.4 (05-29 @ 02:45)  HR: 97 (80 - 108)  BP: 75/49 (64/32 - 133/71)  BP(mean): --  RR: 17 (16 - 18)  SpO2: 100% (75% - 100%)    General Exam:   General appearance: On BIPAP, diaphoretic, NAD                 Neurological Exam:  Mental Status: Orientated to self, date and place.  Attention intact.  No dysarthria, aphasia or neglect.    Cranial Nerves:   Pupils pinpoint bilaterally but reactive, EOMI, VFF, no nystagmus.   CN V1-3 intact to light touch. Difficulty to assess facial asymmetry due to BIPAP.  Hearing intact.      Motor:   Tone: normal.                  Strength:   Pain and effort limited  [] Upper extremity                      Delt       Bicep    Tricep                                                  R         3/5        4+/5        4+/5       5/5                                               L          3/5        3/5        3/5       4/5  [] Lower extremity                       HF          KE          KF        DF         PF                                               R        2/5        3/5        3/5       5/5       5/5                                               L         4+/5        4+/5       4+/5       5/5        5/5                Dysmetria: unable to test  No truncal ataxia.    Tremor: No resting, postural or action tremor.  No myoclonus.    Sensation: intact to light touch and vibration    Deep Tendon Reflexes: 1+ bilateral biceps, triceps, brachioradialis, knee and ankle  Toes flexor bilaterally    Gait: unable to examine    Other:    05-29    137  |  97<L>  |  26<H>  ----------------------------<  172<H>  4.0   |  25  |  6.37<H>    Ca    9.3      29 May 2017 03:50  Phos  2.3     05-29  Mg     2.0     05-29    TPro  6.8  /  Alb  2.4<L>  /  TBili  2.0<H>  /  DBili  x   /  AST  29  /  ALT  12  /  AlkPhos  161<H>  05-29                            9.2    10.87 )-----------( 164      ( 29 May 2017 03:50 )             30.4       Radiology    CT  MRI  EKG:  tele:  TTE:  EEG:

## 2017-05-29 NOTE — PROGRESS NOTE ADULT - ASSESSMENT
Patient is a 62y Male with severe heart failure, ESRD on HD a/w syncope and volume overload. on Dobutamine drip. Renal following for HD. w/pulm edema, resp failure on BiPAP, now w/fever

## 2017-05-29 NOTE — PROGRESS NOTE ADULT - SUBJECTIVE AND OBJECTIVE BOX
patient seen and examined bedside  Subjective: c/o SOB. on bipap. no cp. s/p code sepsis for T 101.8F, bl cxs sent    No Known Allergies    Hospital Medications:   MEDICATIONS  (STANDING):  finasteride 5milliGRAM(s) Oral daily  amiodarone    Tablet 200milliGRAM(s) Oral daily  atorvastatin 20milliGRAM(s) Oral at bedtime  docusate sodium 100milliGRAM(s) Oral daily  midodrine 30milliGRAM(s) Oral every 8 hours  sevelamer hydrochloride 800milliGRAM(s) Oral three times a day with meals  levothyroxine 75MICROGram(s) Oral daily  insulin lispro (HumaLOG) corrective regimen sliding scale  SubCutaneous three times a day before meals  insulin lispro (HumaLOG) corrective regimen sliding scale  SubCutaneous at bedtime  dextrose 5%. 1000milliLiter(s) IV Continuous <Continuous>  dextrose 50% Injectable 12.5Gram(s) IV Push once  dextrose 50% Injectable 25Gram(s) IV Push once  dextrose 50% Injectable 25Gram(s) IV Push once  DOBUTamine Infusion 7MICROgram(s)/kG/Min IV Continuous <Continuous>  epoetin alba Injectable 2000Unit(s) IV Push <User Schedule>  acetaminophen   Tablet. 650milliGRAM(s) Oral once    VITALS:    Vital Signs Last 24 Hrs  T(C): 36.7, Max: 38.6 (05-29 @ 02:45)  T(F): 98, Max: 101.4 (05-29 @ 02:45)  HR: 89 (80 - 108)  BP: 121/79 (64/32 - 133/71)  BP(mean): --  RR: 18 (16 - 18)  SpO2: 100% (75% - 100%)    I&O's Summary      PHYSICAL EXAM:  Constitutional: NAD. on BiPAP  HEENT: anicteric sclera, +pallor  Neck: +JVD  Respiratory: Bibasilar crackles   Cardiovascular: S1, S2, RRR  Gastrointestinal: BS+, soft, NT/ND  Extremities: trace peripheral edema in b/l LE   Neurological: A/O x 3, no focal deficits  Psychiatric: Normal mood, normal affect  : No CVA tenderness. No rosa.   Skin: No rashes  Vascular Access: [facility] Tunneled cath    LABS:    05-29    137  |  97<L>  |  26<H>  ----------------------------<  172<H>  4.0   |  25  |  6.37<H>    Ca    9.3      29 May 2017 03:50  Phos  2.3     05-29  Mg     2.0     05-29    TPro  6.8  /  Alb  2.4<L>  /  TBili  2.0<H>  /  DBili  x   /  AST  29  /  ALT  12  /  AlkPhos  161<H>  05-29                        9.2    10.87 )-----------( 164      ( 29 May 2017 03:50 )             30.4     ABG 7.43/40/134/27/99.3%    RADIOLOGY & ADDITIONAL STUDIES:    RAD CHEST AP PA 1 VIEW  May 26, 2017.  Enlarged cardiac silhouette again noted.    No significant interval change in bilateral reticular and airspace   opacities which may be due to pulmonary edema and/or pneumonia.

## 2017-05-29 NOTE — CHART NOTE - NSCHARTNOTEFT_GEN_A_CORE
Pt has new fever and AMS.   Placed on BiPAP with subsequent improvement in AMS.   On exam tachycardic, febrile, BP difficult to ascertain given AVF on 1 side and PICC on the other. In the L Leg BP is 60s over 40s which is ~ 15mmHg below baseline. + inspiratory crackles on the right up to the apex.   Pt with known low cardiac output state, recent RHC showed CI 1.6 and wedge 25. Per patient and family, aggressive measures are still desired despite multiple GOC discussions. Pt known to me from prior RRT.   CXR reviewed: severe pulmonary edema on my read  62M w/ severe NICM on home  gtt, ESRD on HD MWF, AF on coum, chronic (idiopathic?) hypotension on midodrine admitted for syncope, now c/b fever and AMS.   DDx includes sepsis 2/2 common etiologies such as bacteremia, PNA, viral upper or lower resp infection. Crackles and new BiPAP requirement more likely due to volume overload than from sepsis alone.  Recommendations:   -CCU consult as etiology in this patient is at least in part cardiogenic, and management may require swan parviz catheter  -BCX x2, ABG, RVP, sputum cx  -c/w BiPAP, titrate to SpO2>92, no need for invasive airway for now  -give vanco 1.5G (15mg/kg) now, then check level pre and post HD  -start zosyn and azithro as well  -fluids not administered in this case as patient is already hypervolemic and crystalloid  administration may precipitate respiratory arrest.   -advised staff to administer midodrine earlier than scheduled.     Endorsed to garcia LAYO SCHUSTER [id number]

## 2017-05-29 NOTE — CONSULT NOTE ADULT - ASSESSMENT
61 y/o  man with multiple comorbidities p/w episodes of convulsions in the setting of fever and infection, and left upper extremity weakness on top of generalized weakness. Convulsions could be rigors vs syncopal convulsions vs febrile seizures. Weakness likely pain limited and due to severe illness but would rule out acute stroke in a pt with low EF and episodes of hemodynamic instability.

## 2017-05-29 NOTE — PROGRESS NOTE ADULT - PROBLEM SELECTOR PLAN 1
bedside HD today- w/2K bath, uf 2.5-3kg as tolearted, low bath temp  s/p PUF 5/27- 2h, 1.5 to 2h kg uf  Despite near daily dialysis treatment to help optimize fluid status, pt continues to have worsening pulm edema and dyspnea. Dialysis not adequate to maintain euvolemia, in setting of severe, dobutamine dependent CHF. Very poor prognosis.   Moreover, it will not be logistically possible to have permanent 4 times weekly HD treatments in outpt HD unit.  Agree with pall care f/u to discuss GOC.   c/w renal diet, fluid restriction.

## 2017-05-29 NOTE — CONSULT NOTE ADULT - ASSESSMENT
Fever  Based on the clinical history and imaging and symptoms of thick green sputum, HCAP is the most probable diagnosis.   Would recommend Vancomycin and Cefepime, d/c zithromycin- no need   Would d/c zosyn because zosyn has high sodium content, would switch to cefepime   No evidence of line sepsis, he has three lines and none of them look infected   Will d/w attending and follow up cultures

## 2017-05-29 NOTE — CONSULT NOTE ADULT - ATTENDING COMMENTS
63 y/o male with a PMHx of NICM with severe LV dysfunction (EF 19%) S/P Biotronic ICD placement on Dobutamine gtt via chronic left upper extremity PICC line, ESRD on HD M/W/F, atrial fibrillation on Coumadin, COPD on 4L home O2, HLD, DM, chronic hypotension on Midodrine, presents to ED S/P syncopal episode. Concern for new weakness, SOB, fever. With onset of green sputum, consider possibility of new onset HCAP. Multifactorial respiratory distress on bipap. Would be concerned for fluid overload in this patient with EF 19%, avoid zosyn if possible.  - Cefepime 1gram q 24  - Flagyl 500 q 12 (not ideal for lung, but should confer some anaerobic coverage)  - If worsening sepsis, change back to Zosyn  - Sputum culture, legionella urine  - DC Azithromycin      Carlos Manuel Medellin MD  Pager [phone]  After 5pm and on weekends call [phone]

## 2017-05-29 NOTE — CHART NOTE - NSCHARTNOTEFT_GEN_A_CORE
CARDIOLOGY NURSE PRACTITIONER NOTE     Called to evaluate patient with hypotension in the setting of new fever ? sepsis    Patient is a 62y old  Male with a PMHx of NICM with severe LV dysfunction (EF 19%) S/P Biotronic ICD placement on Dobutamine gtt at home, ESRD on HD M/W/F, atrial fibrillation on Coumadin, COPD on 4L home O2, HLD, DM, chronic hypotension on Midodrine, presents to ED S/P syncopal episode (09 May 2017 15:32) which was likely induced by hypotension and was admitted to telemetry for acute on chronic heart failure. Patient received aggressive HD for CHF and uptitrated dobutamine dose to 7 mcg on 5/25. Called [facility] PA for CCU evaluation. Patient noted to have temperature 101.4 at 2 am and had blood c/s sent. Was found to be desaturating at that time and started Bipap 10/5. Patient required intermittent Bipap throughout the hospitalization for low O2 sat with good effect. Current SpO2 94% on 10/5 60%. ABG results with PO2 134. Patient was found to be hypotensive to 60-70s at 3:30 am. Patient has h/o hypotension and is on Midodrine 30 mg q8h. BP at present is 92/56 and 10 minutes later 90/41. Family at bedside. Explained poor prognosis. Reviewed code status with patient and family.     Allergies    No Known Allergies    MEDICATIONS    amiodarone    Tablet 200milliGRAM(s) Oral daily  midodrine 30milliGRAM(s) Oral every 8 hours  DOBUTamine Infusion 7MICROgram(s)/kG/Min IV Continuous <Continuous>  azithromycin  IVPB  IV Intermittent   piperacillin/tazobactam IVPB. 3.375Gram(s) IV Intermittent once  piperacillin/tazobactam IVPB. 3.375Gram(s) IV Intermittent every 12 hours  vancomycin  IVPB 1250milliGRAM(s) IV Intermittent once  acetaminophen   Tablet 650milliGRAM(s) Oral every 6 hours PRN  acetaminophen   Tablet. 650milliGRAM(s) Oral once  acetaminophen    Suspension. 650milliGRAM(s) Oral every 6 hours PRN  docusate sodium 100milliGRAM(s) Oral daily  finasteride 5milliGRAM(s) Oral daily  atorvastatin 20milliGRAM(s) Oral at bedtime  levothyroxine 75MICROGram(s) Oral daily  insulin lispro (HumaLOG) corrective regimen sliding scale  SubCutaneous three times a day before meals  insulin lispro (HumaLOG) corrective regimen sliding scale  SubCutaneous at bedtime  dextrose Gel 1Dose(s) Oral once PRN  dextrose 50% Injectable 12.5Gram(s) IV Push once  dextrose 50% Injectable 25Gram(s) IV Push once  dextrose 50% Injectable 25Gram(s) IV Push once  glucagon  Injectable 1milliGRAM(s) IntraMuscular once PRN  dextrose 5%. 1000milliLiter(s) IV Continuous <Continuous>  epoetin alba Injectable 2000Unit(s) IV Push <User Schedule>  artificial tears (preservative free) Ophthalmic Solution 1Drop(s) Both EYES three times a day PRN  sodium chloride 0.65% Nasal 1Spray(s) Both Nostrils four times a day PRN    REVIEW OF SYSTEMS:   Complete 10 point ROS negative except     Vital Signs Last 24 Hrs  T(C): 38.6, Max: 38.6 (05-29 @ 02:45)  T(F): 101.4, Max: 101.4 (05-29 @ 02:45)  HR: 89 (78 - 108)  BP: 91/55 (64/32 - 133/71)  BP(mean): --  RR: 16 (16 - 18)  SpO2: 100% (75% - 100%)  PHYSICAL EXAM:    Appearance: NormaL, no distress  HEENT:   Normal oral mucosa, PERRL, EOMI  Cardiovascular: Regular rate and rhythm, Normal S1 S2, No JVD, No murmurs, No edema  Respiratory: Lungs clear to auscultation. No rales, No rhonchi, No wheezing. No tenderness to palpation  Gastrointestinal:  Soft, Non-tender, + BS  Neurologic: Non-focal, A&O x 3  Skin: Warm and dry, No rashes, No ecchymoses, No cyanosis  Extremities: Normal range of motion, No clubbing, cyanosis or edema  Vascular: Peripheral pulses palpable 2+ bilaterally  Psychiatry: Mood & affect appropriate    I&O's Summary    I & Os for current day (as of 29 May 2017 04:44)  =============================================  IN: 928.8 ml / OUT: 800 ml / NET: 128.8 ml    LABORATORY VALUES	 	                          9.2    10.87 )-----------( 164      ( 29 May 2017 03:50 )             30.4       05-29    137  |  97<L>  |  26<H>  ----------------------------<  172<H>  4.0   |  25  |  6.37<H>  05-28    137  |  98  |  21  ----------------------------<  107<H>  3.6   |  24  |  5.44<H>    Ca    9.3      29 May 2017 03:50  Ca    9.4      28 May 2017 07:43  Phos  2.3     05-29  Phos  2.5     05-28  Mg     2.0     05-29  Mg     1.9     05-28    TPro  6.8  /  Alb  2.4<L>  /  TBili  2.0<H>  /  DBili  x   /  AST  29  /  ALT  12  /  AlkPhos  161<H>  05-29    LIVER FUNCTIONS - ( 29 May 2017 03:50 )  Alb: 2.4 g/dL / Pro: 6.8 g/dL / ALK PHOS: 161 u/L / ALT: 12 u/L / AST: 29 u/L / GGT: x           Prothrombin Time, Plasma: 64.2 SEC (05-29 @ 03:50)    Blood Gas Arterial - Lactate: 1.5 mmol/L (05-29 @ 03:54)    05-10 @ 06:30  Cholesterol, Serum - 89  Direct LDL- 31  HDL Cholesterol, Serum- 40  Triglycerides, Serum- 61  Hemoglobin A1C, Whole Blood: 7.1 % (05-10 @ 06:30)  Thyroid Stimulating Hormone, Serum: 4.82 uIU/mL (05-10 @ 06:30)    ABG - ( 29 May 2017 03:54 )  pH: 7.43  /  pCO2: 40    /  pO2: 134   / HCO3: 27    / Base Excess: 2.6   /  SaO2: 99.3      CAPILLARY BLOOD GLUCOSE  143 (29 May 2017 02:00)    05-20 @ 20:53  Culture - Blood:   NO ORGANISMS ISOLATED      ASSESSMENT AND PLAN  62y old  Male with a PMHx of NICM with severe LV dysfunction (EF 19%) S/P Biotronic ICD placement on Dobutamine gtt at home, ESRD on HD M/W/F, atrial fibrillation on Coumadin, COPD on 4L home O2, HLD, DM, chronic hypotension on Midodrine, presents to ED S/P syncopal episode (09 May 2017 15:32) which was likely induced by hypotension and was admitted to telemetry for acute on chronic heart failure. CCU consult for hypotension.     PLAN  Patient hemodynamically stable at present. BP in 90's. Hypotension possibly related to use of BiPAP. Current BP in 90s. No indication for CCU at current time. Will reevaluate as needed.  Continue BiPAP at 10/5. FiO2 50%. Titrate for SpO2 > 92%.  CXR reviewed.   Give am dose of midodrine now.   Continue antibiotics for possible Sepsis.   Patient end stage heart failure. Prognosis poor.       Case Discussed with Cardiology Fellow Jt Mckenna MD

## 2017-05-30 LAB
BASOPHILS # BLD AUTO: 0.02 K/UL — SIGNIFICANT CHANGE UP (ref 0–0.2)
BASOPHILS NFR BLD AUTO: 0.2 % — SIGNIFICANT CHANGE UP (ref 0–2)
BUN SERPL-MCNC: 18 MG/DL — SIGNIFICANT CHANGE UP (ref 7–23)
BUN SERPL-MCNC: 18 MG/DL — SIGNIFICANT CHANGE UP (ref 7–23)
CALCIUM SERPL-MCNC: 9.5 MG/DL — SIGNIFICANT CHANGE UP (ref 8.4–10.5)
CALCIUM SERPL-MCNC: 9.5 MG/DL — SIGNIFICANT CHANGE UP (ref 8.4–10.5)
CHLORIDE SERPL-SCNC: 96 MMOL/L — LOW (ref 98–107)
CHLORIDE SERPL-SCNC: 96 MMOL/L — LOW (ref 98–107)
CO2 SERPL-SCNC: 25 MMOL/L — SIGNIFICANT CHANGE UP (ref 22–31)
CO2 SERPL-SCNC: 25 MMOL/L — SIGNIFICANT CHANGE UP (ref 22–31)
CREAT SERPL-MCNC: 5.01 MG/DL — HIGH (ref 0.5–1.3)
CREAT SERPL-MCNC: 5.01 MG/DL — HIGH (ref 0.5–1.3)
EOSINOPHIL # BLD AUTO: 0.15 K/UL — SIGNIFICANT CHANGE UP (ref 0–0.5)
EOSINOPHIL NFR BLD AUTO: 1.4 % — SIGNIFICANT CHANGE UP (ref 0–6)
GLUCOSE SERPL-MCNC: 91 MG/DL — SIGNIFICANT CHANGE UP (ref 70–99)
GLUCOSE SERPL-MCNC: 91 MG/DL — SIGNIFICANT CHANGE UP (ref 70–99)
HCT VFR BLD CALC: 29.8 % — LOW (ref 39–50)
HCT VFR BLD CALC: 29.8 % — LOW (ref 39–50)
HGB BLD-MCNC: 9.2 G/DL — LOW (ref 13–17)
HGB BLD-MCNC: 9.2 G/DL — LOW (ref 13–17)
IMM GRANULOCYTES NFR BLD AUTO: 0.5 % — SIGNIFICANT CHANGE UP (ref 0–1.5)
INR BLD: 5.5 — CRITICAL HIGH (ref 0.88–1.17)
LYMPHOCYTES # BLD AUTO: 1.51 K/UL — SIGNIFICANT CHANGE UP (ref 1–3.3)
LYMPHOCYTES # BLD AUTO: 14.6 % — SIGNIFICANT CHANGE UP (ref 13–44)
MAGNESIUM SERPL-MCNC: 1.9 MG/DL — SIGNIFICANT CHANGE UP (ref 1.6–2.6)
MCHC RBC-ENTMCNC: 28.1 PG — SIGNIFICANT CHANGE UP (ref 27–34)
MCHC RBC-ENTMCNC: 28.1 PG — SIGNIFICANT CHANGE UP (ref 27–34)
MCHC RBC-ENTMCNC: 30.9 % — LOW (ref 32–36)
MCHC RBC-ENTMCNC: 30.9 % — LOW (ref 32–36)
MCV RBC AUTO: 91.1 FL — SIGNIFICANT CHANGE UP (ref 80–100)
MCV RBC AUTO: 91.1 FL — SIGNIFICANT CHANGE UP (ref 80–100)
MONOCYTES # BLD AUTO: 1.39 K/UL — HIGH (ref 0–0.9)
MONOCYTES NFR BLD AUTO: 13.4 % — SIGNIFICANT CHANGE UP (ref 2–14)
NEUTROPHILS # BLD AUTO: 7.23 K/UL — SIGNIFICANT CHANGE UP (ref 1.8–7.4)
NEUTROPHILS NFR BLD AUTO: 69.9 % — SIGNIFICANT CHANGE UP (ref 43–77)
PHOSPHATE SERPL-MCNC: 2.1 MG/DL — LOW (ref 2.5–4.5)
PLATELET # BLD AUTO: 167 K/UL — SIGNIFICANT CHANGE UP (ref 150–400)
PLATELET # BLD AUTO: 167 K/UL — SIGNIFICANT CHANGE UP (ref 150–400)
PMV BLD: 11.4 FL — SIGNIFICANT CHANGE UP (ref 7–13)
PMV BLD: 11.4 FL — SIGNIFICANT CHANGE UP (ref 7–13)
POTASSIUM SERPL-MCNC: 3.6 MMOL/L — SIGNIFICANT CHANGE UP (ref 3.5–5.3)
POTASSIUM SERPL-MCNC: 3.6 MMOL/L — SIGNIFICANT CHANGE UP (ref 3.5–5.3)
POTASSIUM SERPL-SCNC: 3.6 MMOL/L — SIGNIFICANT CHANGE UP (ref 3.5–5.3)
POTASSIUM SERPL-SCNC: 3.6 MMOL/L — SIGNIFICANT CHANGE UP (ref 3.5–5.3)
PROTHROM AB SERPL-ACNC: 63.8 SEC — HIGH (ref 9.8–13.1)
RBC # BLD: 3.27 M/UL — LOW (ref 4.2–5.8)
RBC # BLD: 3.27 M/UL — LOW (ref 4.2–5.8)
RBC # FLD: 17.8 % — HIGH (ref 10.3–14.5)
RBC # FLD: 17.8 % — HIGH (ref 10.3–14.5)
SODIUM SERPL-SCNC: 136 MMOL/L — SIGNIFICANT CHANGE UP (ref 135–145)
SODIUM SERPL-SCNC: 136 MMOL/L — SIGNIFICANT CHANGE UP (ref 135–145)
SPECIMEN SOURCE: SIGNIFICANT CHANGE UP
VANCOMYCIN FLD-MCNC: 28.3 UG/ML — CRITICAL HIGH
WBC # BLD: 10.35 K/UL — SIGNIFICANT CHANGE UP (ref 3.8–10.5)
WBC # BLD: 10.35 K/UL — SIGNIFICANT CHANGE UP (ref 3.8–10.5)
WBC # FLD AUTO: 10.35 K/UL — SIGNIFICANT CHANGE UP (ref 3.8–10.5)
WBC # FLD AUTO: 10.35 K/UL — SIGNIFICANT CHANGE UP (ref 3.8–10.5)

## 2017-05-30 PROCEDURE — 99223 1ST HOSP IP/OBS HIGH 75: CPT | Mod: GC

## 2017-05-30 PROCEDURE — 99232 SBSQ HOSP IP/OBS MODERATE 35: CPT

## 2017-05-30 PROCEDURE — 99233 SBSQ HOSP IP/OBS HIGH 50: CPT

## 2017-05-30 RX ADMIN — Medication 75 MICROGRAM(S): at 06:08

## 2017-05-30 RX ADMIN — CEFEPIME 100 MILLIGRAM(S): 1 INJECTION, POWDER, FOR SOLUTION INTRAMUSCULAR; INTRAVENOUS at 20:44

## 2017-05-30 RX ADMIN — Medication 500 MILLIGRAM(S): at 17:30

## 2017-05-30 RX ADMIN — Medication 500 MILLIGRAM(S): at 06:08

## 2017-05-30 RX ADMIN — MIDODRINE HYDROCHLORIDE 30 MILLIGRAM(S): 2.5 TABLET ORAL at 17:30

## 2017-05-30 RX ADMIN — AMIODARONE HYDROCHLORIDE 200 MILLIGRAM(S): 400 TABLET ORAL at 06:08

## 2017-05-30 RX ADMIN — Medication 1 SPRAY(S): at 06:09

## 2017-05-30 RX ADMIN — Medication 1 DROP(S): at 06:09

## 2017-05-30 RX ADMIN — MIDODRINE HYDROCHLORIDE 30 MILLIGRAM(S): 2.5 TABLET ORAL at 06:08

## 2017-05-30 NOTE — CHART NOTE - NSCHARTNOTEFT_GEN_A_CORE
called by HF team reading if there are plan to wean pt off biPAP. Case d/w medicine attending, pt unlikely to tolerate weaning likely would require biPAP and 4X HD per week. Recommended to recall palliative regarding GOC discussion as pt with poor prognosis. Will call palliative

## 2017-05-30 NOTE — PROGRESS NOTE ADULT - PROBLEM SELECTOR PLAN 1
Pt had HD yesterday with UF 2kg achieved.   Plan for isolated UF today, UF goal 2 kg again, however UF goal will likely be limited by hypotension.   Despite near daily dialysis treatment to help optimize fluid status, pt continues to have worsening pulm edema and dyspnea. Dialysis not adequate to maintain euvolemia, in setting of severe, dobutamine dependent CHF. Very poor prognosis.   Moreover, it will not be logistically possible to have permanent 4 times weekly HD treatments in outpt HD unit.  Agree with pall care f/u to discuss GOC.   c/w renal diet, fluid restriction.

## 2017-05-30 NOTE — PROGRESS NOTE ADULT - ASSESSMENT
63 yo M with acute on chronic severe systolic CHF (EF 19%), ESRD, DM2, A fib:  - HCAP - continue antibiotics as per ID, continue BiPAP  - Acute on chronic systolic CHF - continue inotropic support as per Cardio, HD for maximum fluid removal  - ESRD - continue HD as per Renal, continue Midodrine  - A fib - continue Amio, hold Coumadin due to supratheraputic INR. No need to reverse as no active bleeding  - DM2 - controlled, continue with ISS  - Lt arm swelling - improving, no DVT  - Prognosis very poor, explained to patient and family, pending Palliative care follow up

## 2017-05-30 NOTE — PROGRESS NOTE ADULT - SUBJECTIVE AND OBJECTIVE BOX
Pt seen and examined at bedside  Pt reports SOB at baseline. Denies chest pain.     Allergies:  No Known Allergies    Hospital Medications:   MEDICATIONS  (STANDING):  finasteride 5milliGRAM(s) Oral daily  amiodarone    Tablet 200milliGRAM(s) Oral daily  atorvastatin 20milliGRAM(s) Oral at bedtime  docusate sodium 100milliGRAM(s) Oral daily  midodrine 30milliGRAM(s) Oral every 8 hours  sevelamer hydrochloride 800milliGRAM(s) Oral three times a day with meals  levothyroxine 75MICROGram(s) Oral daily  insulin lispro (HumaLOG) corrective regimen sliding scale  SubCutaneous three times a day before meals  insulin lispro (HumaLOG) corrective regimen sliding scale  SubCutaneous at bedtime  dextrose 5%. 1000milliLiter(s) IV Continuous <Continuous>  dextrose 50% Injectable 12.5Gram(s) IV Push once  dextrose 50% Injectable 25Gram(s) IV Push once  dextrose 50% Injectable 25Gram(s) IV Push once  DOBUTamine Infusion 7MICROgram(s)/kG/Min IV Continuous <Continuous>  acetaminophen   Tablet. 650milliGRAM(s) Oral once  epoetin alba Injectable 4000Unit(s) IV Push <User Schedule>  cefepime  IVPB 1000milliGRAM(s) IV Intermittent every 24 hours  metroNIDAZOLE    Tablet 500milliGRAM(s) Oral every 12 hours      VITALS:  T(F): 98.2, Max: 98.6 (05-30 @ 04:00)  HR: 80  BP: 86/55  RR: 18  SpO2: 100%  Wt(kg): --    I & Os for current day (as of 05-30 @ 12:53)  =============================================  IN: 749.6 ml / OUT: 2400 ml / NET: -1650.4 ml      PHYSICAL EXAM:  Constitutional: NAD. on BiPAP  HEENT: anicteric sclera, +pallor  Neck: +JVD  Respiratory: Bibasilar crackles   Cardiovascular: S1, S2, RRR  Gastrointestinal: BS+, soft, NT/ND  Extremities: trace peripheral edema in b/l LE   Neurological: A/O x 3, no focal deficits  Psychiatric: Normal mood, normal affect  : No CVA tenderness. No rosa.   Skin: No rashes  Vascular Access: [facility] Tunneled cath    LABS:  05-30    136  |  96<L>  |  18  ----------------------------<  91  3.6   |  25  |  5.01<H>    Ca    9.5      30 May 2017 05:30  Phos  2.1     05-30  Mg     1.9     05-30    TPro  6.8  /  Alb  2.4<L>  /  TBili  2.0<H>  /  DBili      /  AST  29  /  ALT  12  /  AlkPhos  161<H>  05-29    Creatinine Trend: 5.01 <--, 6.37 <--, 5.44 <--, 4.36 <--, 5.54 <--, 4.67 <--, 5.94 <--                        9.2    10.35 )-----------( 167      ( 30 May 2017 05:30 )             29.8     Urine Studies:      RADIOLOGY & ADDITIONAL STUDIES:

## 2017-05-30 NOTE — PROGRESS NOTE ADULT - ASSESSMENT
Patient is a 62y Male with severe heart failure, ESRD on HD a/w syncope and volume overload. on Dobutamine drip. Renal following for HD. w/pulm edema, resp failure on BiPAP, now w/fever and new onset seizures.

## 2017-05-30 NOTE — PROGRESS NOTE ADULT - ASSESSMENT
61 y/o male with a PMHx of NICM with severe LV dysfunction (EF 19%) S/P Biotronic ICD placement on Dobutamine gtt via chronic left upper extremity PICC line, ESRD on HD M/W/F, atrial fibrillation on Coumadin, COPD on 4L home O2, HLD, DM, chronic hypotension on Midodrine, presents to ED S/P syncopal episode. Concern for new weakness, SOB, fever. With onset of green sputum, consider possibility of new onset HCAP. Multifactorial respiratory distress on bipap. Would be concerned for fluid overload in this patient with EF 19%, avoid zosyn if possible. Marginally improved, but still with significant resp distress. Difficult to determine how much of resp distress is 2/2 to fluid overload vs pna.  - Cefepime 1gram q 24  - Flagyl 500 q 12 (not ideal for lung, but should confer some anaerobic coverage)  - If worsening sepsis, consider change back to Zosyn  - Sputum culture, legionella urine  - Low threshold for MICU input  - CHF per primary team      Carlos Manuel Medellin MD  Pager [phone]  After 5pm and on weekends call [phone]

## 2017-05-30 NOTE — PROGRESS NOTE ADULT - ATTENDING COMMENTS
Continue current regimen, including dobutamine.  Maximize volume removal as much as possible.  Pls call with questions.

## 2017-05-30 NOTE — PROGRESS NOTE ADULT - SUBJECTIVE AND OBJECTIVE BOX
Patient is a 62y old  Male who presents with a chief complaint of sob (09 May 2017 15:32). Currently on BIPAP, sister by bedside. Patient states he feels better- less SOB, wants to know when he can eat. No CP, palpitations dizziness or syncope.       INTERVAL HPI/OVERNIGHT EVENTS:    MEDICATIONS  (STANDING):  finasteride 5milliGRAM(s) Oral daily  amiodarone    Tablet 200milliGRAM(s) Oral daily  atorvastatin 20milliGRAM(s) Oral at bedtime  docusate sodium 100milliGRAM(s) Oral daily  midodrine 30milliGRAM(s) Oral every 8 hours  sevelamer hydrochloride 800milliGRAM(s) Oral three times a day with meals  levothyroxine 75MICROGram(s) Oral daily  insulin lispro (HumaLOG) corrective regimen sliding scale  SubCutaneous three times a day before meals  insulin lispro (HumaLOG) corrective regimen sliding scale  SubCutaneous at bedtime  dextrose 5%. 1000milliLiter(s) IV Continuous <Continuous>  dextrose 50% Injectable 12.5Gram(s) IV Push once  dextrose 50% Injectable 25Gram(s) IV Push once  dextrose 50% Injectable 25Gram(s) IV Push once  DOBUTamine Infusion 7MICROgram(s)/kG/Min IV Continuous <Continuous>  acetaminophen   Tablet. 650milliGRAM(s) Oral once  epoetin alba Injectable 4000Unit(s) IV Push <User Schedule>  cefepime  IVPB 1000milliGRAM(s) IV Intermittent every 24 hours  metroNIDAZOLE    Tablet 500milliGRAM(s) Oral every 12 hours    MEDICATIONS  (PRN):  dextrose Gel 1Dose(s) Oral once PRN Blood Glucose LESS THAN 70 milliGRAM(s)/deciliter  glucagon  Injectable 1milliGRAM(s) IntraMuscular once PRN Glucose LESS THAN 70 milligrams/deciliter  acetaminophen   Tablet 650milliGRAM(s) Oral every 6 hours PRN For Temp greater than 38 C (100.4 F)  acetaminophen    Suspension. 650milliGRAM(s) Oral every 6 hours PRN Moderate Pain (4 - 6)  artificial tears (preservative free) Ophthalmic Solution 1Drop(s) Both EYES three times a day PRN Dry Eyes  sodium chloride 0.65% Nasal 1Spray(s) Both Nostrils four times a day PRN Nasal Congestion      Allergies    No Known Allergies    Height (cm): 180.3 (05-09 @ 10:29)  Weight (kg): 99.3 (05-09 @ 10:29)  BMI (kg/m2): 30.5 (05-09 @ 10:29)  BSA (m2): 2.19 (05-09 @ 10:29)  Vital Signs Last 24 Hrs  T(C): 36.8, Max: 37 (05-30 @ 04:00)  T(F): 98.2, Max: 98.6 (05-30 @ 04:00)  HR: 79 (79 - 98)  BP: 107/41 (80/41 - 107/41)  BP(mean): --  RR: 20 (18 - 20)  SpO2: 100% (98% - 100%)  [ ] room air   [ ] 02    PHYSICAL EXAM:  GENERAL:  No acute distress, well appearing, [ ] Agitated, [ ] Lethargy, [ ] confused   HEAD:  normal  ENMT: normal  NECK:  + JVD    NERVOUS SYSTEM:  Alert & Oriented X3, no focal deficits [ ]Confusion  [ ] Encephalopathic [ ] Sedated [ ] Other  CHEST/LUNG:: Mild crackles on bases b/l  [ ] decreased breath sounds at bases  [ ] wheezing   [ ] rhonchi  [ ] crackles  HEART:  Regular rate and rhythm, No murmurs, rubs, or gallops,  [ ] irregular   ABDOMEN:  soft, nontender, nondistended, positive bowel sounds   [ ] obese  EXTREMITIES: No clubbing, cyanosis. Trace LE edema b/l   SKIN: [ ] venous stasis skin changes    LABS:                        9.2    10.35 )-----------( 167      ( 30 May 2017 05:30 )             29.8     30 May 2017 05:30    136    |  96     |  18     ----------------------------<  91     3.6     |  25     |  5.01     Ca    9.5        30 May 2017 05:30  Phos  2.1       30 May 2017 05:30  Mg     1.9       30 May 2017 05:30      PT/INR - ( 30 May 2017 06:45 )   PT: 63.8 SEC;   INR: 5.50          NO ORGANISMS ISOLATED AT 24 HOURS BLOOD PERIPHERAL 05-29 @ 04:10    culture urine  --  05-29 @ 04:10

## 2017-05-30 NOTE — PROGRESS NOTE ADULT - SUBJECTIVE AND OBJECTIVE BOX
CC: F/U for pneumonia    Saw/spoke to patient. No fevers, no chills. Patient feels subjectively better but still on bipap and still significant respiratory distress.    Allergies  No Known Allergies    ANTIMICROBIALS:  cefepime  IVPB 1000 every 24 hours  metroNIDAZOLE    Tablet 500 every 12 hours    PE:    Vital Signs Last 24 Hrs  T(C): 36.8, Max: 37 (05-30 @ 04:00)  T(F): 98.2, Max: 98.6 (05-30 @ 04:00)  HR: 80 (79 - 98)  BP: 86/55 (75/49 - 121/79)  BP(mean): --  RR: 18 (17 - 20)  SpO2: 100% (98% - 100%)    Gen: AOx2-3, NAD, ill appearing, fatigued  CV: S1+S2 normal, no murmurs, nontachycardic  Resp: Decreased but still present crackles, on bipap  Abd: Soft, nontender, +BS  Ext: No LE edema, no wounds    LABS:                        9.2    10.35 )-----------( 167      ( 30 May 2017 05:30 )             29.8       05-30    136  |  96<L>  |  18  ----------------------------<  91  3.6   |  25  |  5.01<H>    Ca    9.5      30 May 2017 05:30  Phos  2.1     05-30  Mg     1.9     05-30    TPro  6.8  /  Alb  2.4<L>  /  TBili  2.0<H>  /  DBili  x   /  AST  29  /  ALT  12  /  AlkPhos  161<H>  05-29      MICROBIOLOGY:  Vancomycin Level, Random: 11.5 ug/mL (05-29 @ 16:35)    BLOOD PERIPHERAL  05-29 @ 04:10 --    NO ORGANISMS ISOLATED  NO ORGANISMS ISOLATED AT 24 HOURS      BLOOD  05-20 @ 20:53 --    NO ORGANISMS ISOLATED      RADIOLOGY:    CXR:  IMPRESSION:   Unchanged CHF and/or pneumonia.

## 2017-05-30 NOTE — PROGRESS NOTE ADULT - ASSESSMENT
61 y/o male w/ acute on chronic combined systolic/ diastolic HF on chronic Dobutamine infusion, ESRD on HD.   -Still on BiPAP.  -Will have HD later today. Will need more aggressive fluid removal as an outpatient to keep patient euvolemic. Recommend at least 4x week HD/PUF as outpatient.   Palliative following.   -Afebrile and no leukocytosis today. S/p IV abx. Cultures have been negative.

## 2017-05-30 NOTE — PROGRESS NOTE ADULT - SUBJECTIVE AND OBJECTIVE BOX
CHIEF COMPLAINT:Patient is a 62y old  Male who presents with a chief complaint of sob (09 May 2017 15:32)    	      No Known Allergies      PAST MEDICAL & SURGICAL HISTORY:  CHIEF COMPLAINT:Patient is a 62y old  Male who presents with a chief complaint of SOB due to terminal heart failure, now with HCAP, on BiPAP      No Known Allergies      PAST MEDICAL & SURGICAL HISTORY:  Chronic hypotension  AF (atrial fibrillation)  COPD (chronic obstructive pulmonary disease): 4L home O2  HLD (hyperlipidemia)  DM (diabetes mellitus)  ESRD (end stage renal disease) on dialysis  BPH (benign prostatic hypertrophy)  Myocardial infarction: 10/2011  Chronic renal insufficiency  Gout  Dyslipidemia  Diabetes mellitus  CHF (congestive heart failure)  AICD (automatic cardioverter/defibrillator) present: Biotronic - placed 9/11/09  H/O coronary angiogram      FAMILY HISTORY:  No pertinent family history in first degree relatives      REVIEW OF SYSTEMS:  CONSTITUTIONAL: No fever, weight loss, or fatigue  EYES: No eye pain, visual disturbances, or discharge  NECK: No pain or stiffness  RESPIRATORY: Cough with green sputum, increased SOB, no wheezing  CARDIOVASCULAR: No chest pain, palpitations, passing out, dizziness, or leg swelling  GASTROINTESTINAL: No abdominal or epigastric pain. No nausea, vomiting, or hematemesis; No diarrhea or constipation. No melena or hematochezia.  GENITOURINARY: No dysuria, frequency, hematuria, or incontinence  NEUROLOGICAL: No headaches, memory loss, loss of strength, numbness, or tremors  SKIN: No itching, burning, rashes, or lesions   LYMPH Nodes: No enlarged glands  ENDOCRINE: No heat or cold intolerance; No hair loss  MUSCULOSKELETAL: No joint pain or swelling; No muscle, back, or extremity pain      Medications:  MEDICATIONS  (STANDING):  finasteride 5milliGRAM(s) Oral daily  amiodarone    Tablet 200milliGRAM(s) Oral daily  atorvastatin 20milliGRAM(s) Oral at bedtime  docusate sodium 100milliGRAM(s) Oral daily  midodrine 30milliGRAM(s) Oral every 8 hours  sevelamer hydrochloride 800milliGRAM(s) Oral three times a day with meals  levothyroxine 75MICROGram(s) Oral daily  insulin lispro (HumaLOG) corrective regimen sliding scale  SubCutaneous three times a day before meals  insulin lispro (HumaLOG) corrective regimen sliding scale  SubCutaneous at bedtime  dextrose 5%. 1000milliLiter(s) IV Continuous <Continuous>  dextrose 50% Injectable 12.5Gram(s) IV Push once  dextrose 50% Injectable 25Gram(s) IV Push once  dextrose 50% Injectable 25Gram(s) IV Push once  DOBUTamine Infusion 7MICROgram(s)/kG/Min IV Continuous <Continuous>  acetaminophen   Tablet. 650milliGRAM(s) Oral once  epoetin alba Injectable 4000Unit(s) IV Push <User Schedule>  cefepime  IVPB 1000milliGRAM(s) IV Intermittent every 24 hours  metroNIDAZOLE    Tablet 500milliGRAM(s) Oral every 12 hours    MEDICATIONS  (PRN):  dextrose Gel 1Dose(s) Oral once PRN Blood Glucose LESS THAN 70 milliGRAM(s)/deciliter  glucagon  Injectable 1milliGRAM(s) IntraMuscular once PRN Glucose LESS THAN 70 milligrams/deciliter  acetaminophen   Tablet 650milliGRAM(s) Oral every 6 hours PRN For Temp greater than 38 C (100.4 F)  acetaminophen    Suspension. 650milliGRAM(s) Oral every 6 hours PRN Moderate Pain (4 - 6)  artificial tears (preservative free) Ophthalmic Solution 1Drop(s) Both EYES three times a day PRN Dry Eyes  sodium chloride 0.65% Nasal 1Spray(s) Both Nostrils four times a day PRN Nasal Congestion    	    PHYSICAL EXAM:  T(C): 36.4, Max: 37 (05-30 @ 04:00)  HR: 92 (79 - 98)  BP: 83/52 (75/49 - 121/79)  RR: 18 (17 - 20)  SpO2: 99% (98% - 100%)  Wt(kg): --  I&O's Summary    I & Os for current day (as of 30 May 2017 09:55)  =============================================  IN: 749.6 ml / OUT: 2400 ml / NET: -1650.4 ml      Appearance: Normal	  HEENT:   Normal oral mucosa, PERRL, EOMI	  Lymphatic: No lymphadenopathy  Cardiovascular: Normal S1 S2, No JVD, No murmurs, No edema  Respiratory: Lungs clear to auscultation	  Psychiatry: A & O x 3, Mood & affect appropriate  Gastrointestinal:  Soft, Non-tender, + BS	  Skin: No rashes, No ecchymoses, No cyanosis	  Neurologic: Non-focal  Extremities: Normal range of motion, No clubbing, cyanosis or edema  Vascular: Peripheral pulses palpable 2+ bilaterally                              9.2    10.35 )-----------( 167      ( 30 May 2017 05:30 )             29.8     05-30    136  |  96<L>  |  18  ----------------------------<  91  3.6   |  25  |  5.01<H>    Ca    9.5      30 May 2017 05:30  Phos  2.1     05-30  Mg     1.9     05-30    TPro  6.8  /  Alb  2.4<L>  /  TBili  2.0<H>  /  DBili  x   /  AST  29  /  ALT  12  /  AlkPhos  161<H>  05-29

## 2017-05-31 LAB
BUN SERPL-MCNC: 27 MG/DL — HIGH (ref 7–23)
CALCIUM SERPL-MCNC: 9.1 MG/DL — SIGNIFICANT CHANGE UP (ref 8.4–10.5)
CHLORIDE SERPL-SCNC: 96 MMOL/L — LOW (ref 98–107)
CO2 SERPL-SCNC: 24 MMOL/L — SIGNIFICANT CHANGE UP (ref 22–31)
CREAT SERPL-MCNC: 6.76 MG/DL — HIGH (ref 0.5–1.3)
GLUCOSE SERPL-MCNC: 65 MG/DL — LOW (ref 70–99)
HCT VFR BLD CALC: 26.6 % — LOW (ref 39–50)
HGB BLD-MCNC: 8.2 G/DL — LOW (ref 13–17)
INR BLD: 5.61 — CRITICAL HIGH (ref 0.88–1.17)
L PNEUMO AG UR QL: NEGATIVE — SIGNIFICANT CHANGE UP
MAGNESIUM SERPL-MCNC: 2.1 MG/DL — SIGNIFICANT CHANGE UP (ref 1.6–2.6)
MCHC RBC-ENTMCNC: 27.9 PG — SIGNIFICANT CHANGE UP (ref 27–34)
MCHC RBC-ENTMCNC: 30.8 % — LOW (ref 32–36)
MCV RBC AUTO: 90.5 FL — SIGNIFICANT CHANGE UP (ref 80–100)
PHOSPHATE SERPL-MCNC: 3.5 MG/DL — SIGNIFICANT CHANGE UP (ref 2.5–4.5)
PLATELET # BLD AUTO: 151 K/UL — SIGNIFICANT CHANGE UP (ref 150–400)
PMV BLD: 10.9 FL — SIGNIFICANT CHANGE UP (ref 7–13)
POTASSIUM SERPL-MCNC: 4 MMOL/L — SIGNIFICANT CHANGE UP (ref 3.5–5.3)
POTASSIUM SERPL-SCNC: 4 MMOL/L — SIGNIFICANT CHANGE UP (ref 3.5–5.3)
PROTHROM AB SERPL-ACNC: 65.1 SEC — HIGH (ref 9.8–13.1)
RBC # BLD: 2.94 M/UL — LOW (ref 4.2–5.8)
RBC # FLD: 17.3 % — HIGH (ref 10.3–14.5)
SODIUM SERPL-SCNC: 134 MMOL/L — LOW (ref 135–145)
VANCOMYCIN FLD-MCNC: 25.6 UG/ML — CRITICAL HIGH
WBC # BLD: 8.72 K/UL — SIGNIFICANT CHANGE UP (ref 3.8–10.5)
WBC # FLD AUTO: 8.72 K/UL — SIGNIFICANT CHANGE UP (ref 3.8–10.5)

## 2017-05-31 RX ORDER — MIDODRINE HYDROCHLORIDE 2.5 MG/1
5 TABLET ORAL ONCE
Qty: 0 | Refills: 0 | Status: COMPLETED | OUTPATIENT
Start: 2017-05-31 | End: 2017-05-31

## 2017-05-31 RX ADMIN — MIDODRINE HYDROCHLORIDE 5 MILLIGRAM(S): 2.5 TABLET ORAL at 17:55

## 2017-05-31 RX ADMIN — Medication 500 MILLIGRAM(S): at 05:46

## 2017-05-31 RX ADMIN — ATORVASTATIN CALCIUM 20 MILLIGRAM(S): 80 TABLET, FILM COATED ORAL at 21:13

## 2017-05-31 RX ADMIN — Medication 75 MICROGRAM(S): at 05:46

## 2017-05-31 RX ADMIN — MIDODRINE HYDROCHLORIDE 30 MILLIGRAM(S): 2.5 TABLET ORAL at 13:54

## 2017-05-31 RX ADMIN — MIDODRINE HYDROCHLORIDE 30 MILLIGRAM(S): 2.5 TABLET ORAL at 00:10

## 2017-05-31 RX ADMIN — MIDODRINE HYDROCHLORIDE 30 MILLIGRAM(S): 2.5 TABLET ORAL at 05:46

## 2017-05-31 RX ADMIN — AMIODARONE HYDROCHLORIDE 200 MILLIGRAM(S): 400 TABLET ORAL at 05:46

## 2017-05-31 RX ADMIN — ERYTHROPOIETIN 4000 UNIT(S): 10000 INJECTION, SOLUTION INTRAVENOUS; SUBCUTANEOUS at 19:08

## 2017-05-31 RX ADMIN — ATORVASTATIN CALCIUM 20 MILLIGRAM(S): 80 TABLET, FILM COATED ORAL at 00:10

## 2017-05-31 RX ADMIN — MIDODRINE HYDROCHLORIDE 30 MILLIGRAM(S): 2.5 TABLET ORAL at 21:13

## 2017-05-31 RX ADMIN — Medication 500 MILLIGRAM(S): at 21:13

## 2017-05-31 RX ADMIN — CEFEPIME 100 MILLIGRAM(S): 1 INJECTION, POWDER, FOR SOLUTION INTRAMUSCULAR; INTRAVENOUS at 21:13

## 2017-05-31 NOTE — PROGRESS NOTE ADULT - PROBLEM SELECTOR PLAN 1
Pt had isolated UF yesterday with net UF 1.6kg achieved.   Plan for HD today, w/2k bath, UF goal 2.5-3 kg as tolerated, low bath temp  however UF goal will likely be limited by hypotension.   will reevaluate for isolated UF tomorrow  Despite near daily dialysis/uf treatment to help optimize fluid status, pt continues to have worsening pulm edema and dyspnea. Dialysis not adequate to maintain euvolemia, in setting of severe, dobutamine dependent CHF. Very poor prognosis.   Moreover, it will not be logistically possible to have permanent 4 times weekly HD treatments in outpt HD unit.  f/u w/pall care, discuss GOC w/family.   c/w renal diet, fluid restriction.

## 2017-05-31 NOTE — PROGRESS NOTE ADULT - ASSESSMENT
61 yo M with acute on chronic severe systolic CHF (EF 19%), ESRD, DM2, A fib:  - HCAP - continue antibiotics as per ID, wean off BiPAP to Face mask 40% today, continue BiPAP QHS  - Acute on chronic systolic CHF - continue inotropic support as per Cardio, HD for maximum fluid removal  - ESRD - continue HD as per Renal, continue Midodrine  - A fib - continue Amio, hold Coumadin due to supratheraputic INR. No need to reverse as no active bleeding  - DM2 - controlled, continue with ISS  - Lt arm swelling - improving, no DVT  - Prognosis very poor, explained to patient and family, pending Palliative care follow up

## 2017-05-31 NOTE — PROGRESS NOTE ADULT - ASSESSMENT
Patient is a 62y Male with severe heart failure, ESRD on HD a/w syncope and volume overload. on Dobutamine drip. Renal following for HD/PUF, for fluid Mx. w/pulm edema, resp failure on BiPAP, now w/fever and new onset seizures. Hypervolemia appears little better now.

## 2017-05-31 NOTE — PROGRESS NOTE ADULT - SUBJECTIVE AND OBJECTIVE BOX
Pt seen and examined at bedside    Subjective: Pt currently off BiPAP, on VM 50% Fi02. SOB better. Denies chest pain. desaturated earlier today off VM while eating as per RN.     Allergies:  No Known Allergies    Hospital Medications:   MEDICATIONS  (STANDING):  finasteride 5milliGRAM(s) Oral daily  amiodarone    Tablet 200milliGRAM(s) Oral daily  atorvastatin 20milliGRAM(s) Oral at bedtime  docusate sodium 100milliGRAM(s) Oral daily  midodrine 30milliGRAM(s) Oral every 8 hours  sevelamer hydrochloride 800milliGRAM(s) Oral three times a day with meals  levothyroxine 75MICROGram(s) Oral daily  insulin lispro (HumaLOG) corrective regimen sliding scale  SubCutaneous three times a day before meals  insulin lispro (HumaLOG) corrective regimen sliding scale  SubCutaneous at bedtime  dextrose 5%. 1000milliLiter(s) IV Continuous <Continuous>  dextrose 50% Injectable 12.5Gram(s) IV Push once  dextrose 50% Injectable 25Gram(s) IV Push once  dextrose 50% Injectable 25Gram(s) IV Push once  DOBUTamine Infusion 7MICROgram(s)/kG/Min IV Continuous <Continuous>  acetaminophen   Tablet. 650milliGRAM(s) Oral once  epoetin alba Injectable 4000Unit(s) IV Push <User Schedule>  cefepime  IVPB 1000milliGRAM(s) IV Intermittent every 24 hours  metroNIDAZOLE    Tablet 500milliGRAM(s) Oral every 12 hours      VITALS:  Vital Signs Last 24 Hrs  T(C): 36.2, Max: 36.8 (05-30 @ 17:25)  T(F): 97.1, Max: 98.3 (05-31 @ 05:40)  HR: 83 (74 - 92)  BP: 89/56 (82/47 - 119/83)  BP(mean): --  RR: 17 (17 - 20)  SpO2: 100% (98% - 100%)    I&O's Summary    I & Os for current day (as of 31 May 2017 14:45)  =============================================  IN: 750 ml / OUT: 2000 ml / NET: -1250 ml    PHYSICAL EXAM:  Constitutional: NAD. on VM  HEENT: anicteric sclera, +pallor  Neck: +JVD  Respiratory: Bibasilar crackles   Cardiovascular: S1, S2, RRR  Gastrointestinal: BS+, soft, NT/ND  Extremities: trace peripheral edema in b/l LE   Neurological: A/O x 3, no focal deficits  Psychiatric: Normal mood, normal affect  : No CVA tenderness. No rosa.   Skin: No rashes  Vascular Access: RIJ Tunneled cath    LABS:    05-30    136  |  96<L>  |  18  ----------------------------<  91  3.6   |  25  |  5.01<H>    Ca    9.5      30 May 2017 05:30  Phos  2.1     05-30  Mg     1.9     05-30                          9.2    10.35 )-----------( 167      ( 30 May 2017 05:30 )             29.8       RADIOLOGY & ADDITIONAL STUDIES:     RAD CHEST PORTABLE IMMEDIATE  Unchanged CHF and/or pneumonia.

## 2017-05-31 NOTE — PROGRESS NOTE ADULT - SUBJECTIVE AND OBJECTIVE BOX
CHIEF COMPLAINT:Patient is a 62y old  Male who presents with a chief complaint of SOB due to terminal heart failure, now with HCAP, on BiPAP      No Known Allergies      PAST MEDICAL & SURGICAL HISTORY:  Chronic hypotension  AF (atrial fibrillation)  COPD (chronic obstructive pulmonary disease): 4L home O2  HLD (hyperlipidemia)  DM (diabetes mellitus)  ESRD (end stage renal disease) on dialysis  BPH (benign prostatic hypertrophy)  Myocardial infarction: 10/2011  Chronic renal insufficiency  Gout  Dyslipidemia  Diabetes mellitus  CHF (congestive heart failure)  AICD (automatic cardioverter/defibrillator) present: Biotronic - placed 9/11/09  H/O coronary angiogram      FAMILY HISTORY:  No pertinent family history in first degree relatives      REVIEW OF SYSTEMS:  CONSTITUTIONAL: No fever, weight loss, or fatigue  EYES: No eye pain, visual disturbances, or discharge  NECK: No pain or stiffness  RESPIRATORY: Cough with green sputum, improving SOB, no wheezing  CARDIOVASCULAR: No chest pain, palpitations, passing out, dizziness, or leg swelling  GASTROINTESTINAL: No abdominal or epigastric pain. No nausea, vomiting, or hematemesis; No diarrhea or constipation. No melena or hematochezia.  GENITOURINARY: No dysuria, frequency, hematuria, or incontinence  NEUROLOGICAL: No headaches, memory loss, loss of strength, numbness, or tremors  SKIN: No itching, burning, rashes, or lesions   LYMPH Nodes: No enlarged glands  ENDOCRINE: No heat or cold intolerance; No hair loss  MUSCULOSKELETAL: No joint pain or swelling; No muscle, back, or extremity pain  Medications:  MEDICATIONS  (STANDING):  finasteride 5milliGRAM(s) Oral daily  amiodarone    Tablet 200milliGRAM(s) Oral daily  atorvastatin 20milliGRAM(s) Oral at bedtime  docusate sodium 100milliGRAM(s) Oral daily  midodrine 30milliGRAM(s) Oral every 8 hours  sevelamer hydrochloride 800milliGRAM(s) Oral three times a day with meals  levothyroxine 75MICROGram(s) Oral daily  insulin lispro (HumaLOG) corrective regimen sliding scale  SubCutaneous three times a day before meals  insulin lispro (HumaLOG) corrective regimen sliding scale  SubCutaneous at bedtime  dextrose 5%. 1000milliLiter(s) IV Continuous <Continuous>  dextrose 50% Injectable 12.5Gram(s) IV Push once  dextrose 50% Injectable 25Gram(s) IV Push once  dextrose 50% Injectable 25Gram(s) IV Push once  DOBUTamine Infusion 7MICROgram(s)/kG/Min IV Continuous <Continuous>  acetaminophen   Tablet. 650milliGRAM(s) Oral once  epoetin alba Injectable 4000Unit(s) IV Push <User Schedule>  cefepime  IVPB 1000milliGRAM(s) IV Intermittent every 24 hours  metroNIDAZOLE    Tablet 500milliGRAM(s) Oral every 12 hours    MEDICATIONS  (PRN):  dextrose Gel 1Dose(s) Oral once PRN Blood Glucose LESS THAN 70 milliGRAM(s)/deciliter  glucagon  Injectable 1milliGRAM(s) IntraMuscular once PRN Glucose LESS THAN 70 milligrams/deciliter  acetaminophen   Tablet 650milliGRAM(s) Oral every 6 hours PRN For Temp greater than 38 C (100.4 F)  acetaminophen    Suspension. 650milliGRAM(s) Oral every 6 hours PRN Moderate Pain (4 - 6)  artificial tears (preservative free) Ophthalmic Solution 1Drop(s) Both EYES three times a day PRN Dry Eyes  sodium chloride 0.65% Nasal 1Spray(s) Both Nostrils four times a day PRN Nasal Congestion    	    PHYSICAL EXAM:  T(C): 36.8, Max: 36.8 (05-30 @ 14:20)  HR: 88 (74 - 92)  BP: 87/51 (82/47 - 119/83)  RR: 18 (18 - 20)  SpO2: 100% (98% - 100%)  Wt(kg): --  I&O's Summary    I & Os for current day (as of 31 May 2017 10:08)  =============================================  IN: 750 ml / OUT: 2000 ml / NET: -1250 ml      Appearance: Normal	  HEENT:   Normal oral mucosa, PERRL, EOMI	  Lymphatic: No lymphadenopathy  Cardiovascular: Normal S1 S2, No JVD, No murmurs, No edema  Respiratory: Lungs clear to auscultation, on BiPAP  Psychiatry: A & O x 3, Mood & affect appropriate  Gastrointestinal:  Soft, Non-tender, + BS	  Skin: No rashes, No ecchymoses, No cyanosis	  Neurologic: Non-focal  Extremities: Normal range of motion, No clubbing, cyanosis or edema  Vascular: Peripheral pulses palpable 2+ bilaterally                                9.2    10.35 )-----------( 167      ( 30 May 2017 05:30 )             29.8     05-30    136  |  96<L>  |  18  ----------------------------<  91  3.6   |  25  |  5.01<H>    Ca    9.5      30 May 2017 05:30  Phos  2.1     05-30  Mg     1.9     05-30

## 2017-06-01 LAB
APTT BLD: 70.2 SEC — HIGH (ref 27.5–37.4)
BUN SERPL-MCNC: 17 MG/DL — SIGNIFICANT CHANGE UP (ref 7–23)
CALCIUM SERPL-MCNC: 8.9 MG/DL — SIGNIFICANT CHANGE UP (ref 8.4–10.5)
CHLORIDE SERPL-SCNC: 100 MMOL/L — SIGNIFICANT CHANGE UP (ref 98–107)
CO2 SERPL-SCNC: 25 MMOL/L — SIGNIFICANT CHANGE UP (ref 22–31)
CREAT SERPL-MCNC: 5.08 MG/DL — HIGH (ref 0.5–1.3)
GLUCOSE SERPL-MCNC: 80 MG/DL — SIGNIFICANT CHANGE UP (ref 70–99)
HCT VFR BLD CALC: 27.9 % — LOW (ref 39–50)
HGB BLD-MCNC: 8.5 G/DL — LOW (ref 13–17)
INR BLD: 5.38 — CRITICAL HIGH (ref 0.88–1.17)
MCHC RBC-ENTMCNC: 27.6 PG — SIGNIFICANT CHANGE UP (ref 27–34)
MCHC RBC-ENTMCNC: 30.5 % — LOW (ref 32–36)
MCV RBC AUTO: 90.6 FL — SIGNIFICANT CHANGE UP (ref 80–100)
PLATELET # BLD AUTO: 148 K/UL — LOW (ref 150–400)
PMV BLD: 10.8 FL — SIGNIFICANT CHANGE UP (ref 7–13)
POTASSIUM SERPL-MCNC: 3.5 MMOL/L — SIGNIFICANT CHANGE UP (ref 3.5–5.3)
POTASSIUM SERPL-SCNC: 3.5 MMOL/L — SIGNIFICANT CHANGE UP (ref 3.5–5.3)
PROTHROM AB SERPL-ACNC: 62.4 SEC — HIGH (ref 9.8–13.1)
RBC # BLD: 3.08 M/UL — LOW (ref 4.2–5.8)
RBC # FLD: 17.7 % — HIGH (ref 10.3–14.5)
SODIUM SERPL-SCNC: 139 MMOL/L — SIGNIFICANT CHANGE UP (ref 135–145)
VANCOMYCIN FLD-MCNC: 20.1 UG/ML — SIGNIFICANT CHANGE UP
WBC # BLD: 10.38 K/UL — SIGNIFICANT CHANGE UP (ref 3.8–10.5)
WBC # FLD AUTO: 10.38 K/UL — SIGNIFICANT CHANGE UP (ref 3.8–10.5)

## 2017-06-01 PROCEDURE — 99232 SBSQ HOSP IP/OBS MODERATE 35: CPT

## 2017-06-01 RX ADMIN — Medication 500 MILLIGRAM(S): at 06:14

## 2017-06-01 RX ADMIN — FINASTERIDE 5 MILLIGRAM(S): 5 TABLET, FILM COATED ORAL at 12:39

## 2017-06-01 RX ADMIN — Medication 650 MILLIGRAM(S): at 23:00

## 2017-06-01 RX ADMIN — Medication 75 MICROGRAM(S): at 06:12

## 2017-06-01 RX ADMIN — Medication 650 MILLIGRAM(S): at 09:48

## 2017-06-01 RX ADMIN — Medication 500 MILLIGRAM(S): at 22:18

## 2017-06-01 RX ADMIN — MIDODRINE HYDROCHLORIDE 30 MILLIGRAM(S): 2.5 TABLET ORAL at 12:41

## 2017-06-01 RX ADMIN — ATORVASTATIN CALCIUM 20 MILLIGRAM(S): 80 TABLET, FILM COATED ORAL at 22:52

## 2017-06-01 RX ADMIN — Medication 650 MILLIGRAM(S): at 22:18

## 2017-06-01 RX ADMIN — CEFEPIME 100 MILLIGRAM(S): 1 INJECTION, POWDER, FOR SOLUTION INTRAMUSCULAR; INTRAVENOUS at 22:52

## 2017-06-01 RX ADMIN — Medication 20.85 MICROGRAM(S)/KG/MIN: at 03:55

## 2017-06-01 RX ADMIN — Medication 20.85 MICROGRAM(S)/KG/MIN: at 09:48

## 2017-06-01 RX ADMIN — AMIODARONE HYDROCHLORIDE 200 MILLIGRAM(S): 400 TABLET ORAL at 06:14

## 2017-06-01 RX ADMIN — MIDODRINE HYDROCHLORIDE 30 MILLIGRAM(S): 2.5 TABLET ORAL at 06:12

## 2017-06-01 RX ADMIN — MIDODRINE HYDROCHLORIDE 30 MILLIGRAM(S): 2.5 TABLET ORAL at 22:18

## 2017-06-01 NOTE — PROGRESS NOTE ADULT - ASSESSMENT
Patient is a 62y Male with severe heart failure, ESRD on HD a/w syncope and volume overload. on Dobutamine drip. Renal following for HD/PUF, for fluid Mx. w/pulm edema, resp failure on BiPAP, now w/fever and new onset seizures.

## 2017-06-01 NOTE — PROGRESS NOTE ADULT - ASSESSMENT
63 y/o male with a PMHx of NICM with severe LV dysfunction (EF 19%) S/P Biotronic ICD placement on Dobutamine gtt via chronic left upper extremity PICC line, ESRD on HD M/W/F, atrial fibrillation on Coumadin, COPD on 4L home O2, HLD, DM, chronic hypotension on Midodrine, presents to ED S/P syncopal episode. Concern for new weakness, SOB, fever. With onset of green sputum, consider possibility of new onset HCAP. Multifactorial respiratory distress on bipap. Would be concerned for fluid overload in this patient with EF 19%, avoid zosyn if possible. Resp status overall improved, afeb, no leukocytosis.  - Cefepime 1gram q 24  - Flagyl 500 q 12  - Abx for 7 days total then can discontinue and observe  - CHF per primary team  - Will sign off. Please call with questions or change in status.      Carlos Manuel Medellin MD  Pager [phone]  After 5pm and on weekends call [phone]

## 2017-06-01 NOTE — PROGRESS NOTE ADULT - ATTENDING COMMENTS
61 yo M with acute on chronic severe systolic CHF (EF 19%), ESRD, DM2, A fib:  - HCAP - continue antibiotics as per ID, switch from face mask to NC, titrate O2 as tolerated  - Acute on chronic systolic CHF - continue inotropic support as per Cardio, HD for maximum fluid removal  - ESRD - continue HD as per Renal, continue Midodrine.  - A fib - continue Amio, hold Coumadin due to supratheraputic INR. No need to reverse as no active bleeding  - DM2 - controlled, continue with ISS  - Prognosis very poor, explained to patient and family, pending Palliative care follow up

## 2017-06-01 NOTE — PROGRESS NOTE ADULT - PROBLEM SELECTOR PLAN 1
Pt had HD yesterday, but unable to tolerate PUF today, due to severe hypotension.  Treatment terminated after 30 mins, minimal UF achieved.   will reevaluate for HD tomorrow. K controlled.   Despite near daily dialysis/uf treatment to help optimize fluid status, pt continues to have pulm edema and dyspnea. Dialysis not adequate to maintain euvolemia, in setting of severe, dobutamine dependent CHF. Very poor prognosis.   Moreover, it will not be logistically possible to have permanent 4 times weekly HD treatments in outpt HD unit.  f/u w/pall care, discuss GOC w/family.   c/w renal diet, fluid restriction.

## 2017-06-01 NOTE — PROGRESS NOTE ADULT - SUBJECTIVE AND OBJECTIVE BOX
Pt seen and examined at bedside  Pt unable to tolerate isloated UF today, due to significant hypotension.     Allergies:  No Known Allergies    Hospital Medications:   MEDICATIONS  (STANDING):  finasteride 5milliGRAM(s) Oral daily  amiodarone    Tablet 200milliGRAM(s) Oral daily  atorvastatin 20milliGRAM(s) Oral at bedtime  docusate sodium 100milliGRAM(s) Oral daily  midodrine 30milliGRAM(s) Oral every 8 hours  sevelamer hydrochloride 800milliGRAM(s) Oral three times a day with meals  levothyroxine 75MICROGram(s) Oral daily  insulin lispro (HumaLOG) corrective regimen sliding scale  SubCutaneous three times a day before meals  insulin lispro (HumaLOG) corrective regimen sliding scale  SubCutaneous at bedtime  dextrose 5%. 1000milliLiter(s) IV Continuous <Continuous>  dextrose 50% Injectable 12.5Gram(s) IV Push once  dextrose 50% Injectable 25Gram(s) IV Push once  dextrose 50% Injectable 25Gram(s) IV Push once  DOBUTamine Infusion 7MICROgram(s)/kG/Min IV Continuous <Continuous>  acetaminophen   Tablet. 650milliGRAM(s) Oral once  epoetin alba Injectable 4000Unit(s) IV Push <User Schedule>  cefepime  IVPB 1000milliGRAM(s) IV Intermittent every 24 hours  metroNIDAZOLE    Tablet 500milliGRAM(s) Oral every 12 hours    VITALS:  T(F): 97.4, Max: 97.8 (05-31 @ 21:10)  HR: 80  BP: 108/87  RR: 20  SpO2: 98%  Wt(kg): --  I & Os for 24h ending 06-01 @ 07:00  =============================================  IN: 870 ml / OUT: 2200 ml / NET: -1330 ml    I & Os for current day (as of 06-01 @ 16:06)  =============================================  IN: 400 ml / OUT: 326 ml / NET: 74 ml      PHYSICAL EXAM:  Constitutional: NAD.  HEENT: anicteric sclera, +pallor  Neck: +JVD  Respiratory: Bibasilar crackles   Cardiovascular: S1, S2, RRR  Gastrointestinal: BS+, soft, NT. Distended  Extremities: trace peripheral edema in b/l LE   Neurological: A/O x 3, no focal deficits  Psychiatric: Normal mood, normal affect  : No CVA tenderness. No rosa.   Skin: No rashes  Vascular Access: RIJ Tunneled cath    LABS:  06-01    139  |  100  |  17  ----------------------------<  80  3.5   |  25  |  5.08<H>    Ca    8.9      01 Jun 2017 07:30  Phos  3.5     05-31  Mg     2.1     05-31      Creatinine Trend: 5.08 <--, 6.76 <--, 5.01 <--, 6.37 <--, 5.44 <--, 4.36 <--, 5.54 <--                        8.5    10.38 )-----------( 148      ( 01 Jun 2017 07:30 )             27.9

## 2017-06-01 NOTE — PROGRESS NOTE ADULT - SUBJECTIVE AND OBJECTIVE BOX
CCHIEF COMPLAINT:Patient is a 62y old  Male who presents with a chief complaint of SOB due to terminal heart failure, now with HCAP, improving      No Known Allergies      PAST MEDICAL & SURGICAL HISTORY:  Chronic hypotension  AF (atrial fibrillation)  COPD (chronic obstructive pulmonary disease): 4L home O2  HLD (hyperlipidemia)  DM (diabetes mellitus)  ESRD (end stage renal disease) on dialysis  BPH (benign prostatic hypertrophy)  Myocardial infarction: 10/2011  Chronic renal insufficiency  Gout  Dyslipidemia  Diabetes mellitus  CHF (congestive heart failure)  AICD (automatic cardioverter/defibrillator) present: Biotronic - placed 9/11/09  H/O coronary angiogram      FAMILY HISTORY:  No pertinent family history in first degree relatives      REVIEW OF SYSTEMS:  CONSTITUTIONAL: No fever, weight loss, or fatigue  EYES: No eye pain, visual disturbances, or discharge  NECK: No pain or stiffness  RESPIRATORY: Cough with green sputum improving, improving SOB, no wheezing  CARDIOVASCULAR: No chest pain, palpitations, passing out, dizziness, or leg swelling  GASTROINTESTINAL: No abdominal or epigastric pain. No nausea, vomiting, or hematemesis; No diarrhea or constipation. No melena or hematochezia.  GENITOURINARY: No dysuria, frequency, hematuria, or incontinence  NEUROLOGICAL: No headaches, memory loss, loss of strength, numbness, or tremors  SKIN: No itching, burning, rashes, or lesions   LYMPH Nodes: No enlarged glands  ENDOCRINE: No heat or cold intolerance; No hair loss  MUSCULOSKELETAL: No joint pain or swelling; No muscle, back, or extremity pain    Medications:  MEDICATIONS  (STANDING):  finasteride 5milliGRAM(s) Oral daily  amiodarone    Tablet 200milliGRAM(s) Oral daily  atorvastatin 20milliGRAM(s) Oral at bedtime  docusate sodium 100milliGRAM(s) Oral daily  midodrine 30milliGRAM(s) Oral every 8 hours  sevelamer hydrochloride 800milliGRAM(s) Oral three times a day with meals  levothyroxine 75MICROGram(s) Oral daily  insulin lispro (HumaLOG) corrective regimen sliding scale  SubCutaneous three times a day before meals  insulin lispro (HumaLOG) corrective regimen sliding scale  SubCutaneous at bedtime  dextrose 5%. 1000milliLiter(s) IV Continuous <Continuous>  dextrose 50% Injectable 12.5Gram(s) IV Push once  dextrose 50% Injectable 25Gram(s) IV Push once  dextrose 50% Injectable 25Gram(s) IV Push once  DOBUTamine Infusion 7MICROgram(s)/kG/Min IV Continuous <Continuous>  acetaminophen   Tablet. 650milliGRAM(s) Oral once  epoetin alba Injectable 4000Unit(s) IV Push <User Schedule>  cefepime  IVPB 1000milliGRAM(s) IV Intermittent every 24 hours  metroNIDAZOLE    Tablet 500milliGRAM(s) Oral every 12 hours    MEDICATIONS  (PRN):  dextrose Gel 1Dose(s) Oral once PRN Blood Glucose LESS THAN 70 milliGRAM(s)/deciliter  glucagon  Injectable 1milliGRAM(s) IntraMuscular once PRN Glucose LESS THAN 70 milligrams/deciliter  acetaminophen   Tablet 650milliGRAM(s) Oral every 6 hours PRN For Temp greater than 38 C (100.4 F)  acetaminophen    Suspension. 650milliGRAM(s) Oral every 6 hours PRN Moderate Pain (4 - 6)  artificial tears (preservative free) Ophthalmic Solution 1Drop(s) Both EYES three times a day PRN Dry Eyes  sodium chloride 0.65% Nasal 1Spray(s) Both Nostrils four times a day PRN Nasal Congestion    	    PHYSICAL EXAM:  T(C): 36.3, Max: 36.6 (05-31 @ 21:10)  HR: 80 (75 - 96)  BP: 108/87 (84/43 - 112/70)  RR: 20 (16 - 20)  SpO2: 98% (96% - 100%)  Wt(kg): --  I&O's Summary  I & Os for 24h ending 01 Jun 2017 07:00  =============================================  IN: 870 ml / OUT: 2200 ml / NET: -1330 ml    I & Os for current day (as of 01 Jun 2017 15:11)  =============================================  IN: 400 ml / OUT: 326 ml / NET: 74 ml      Appearance: Normal	  HEENT:   Normal oral mucosa, PERRL, EOMI	  Lymphatic: No lymphadenopathy  Cardiovascular: Normal S1 S2, No JVD, No murmurs, No edema  Respiratory: Lungs clear to auscultation	  Psychiatry: A & O x 3, Mood & affect appropriate  Gastrointestinal:  Soft, Non-tender, + BS	  Skin: No rashes, No ecchymoses, No cyanosis	  Neurologic: Non-focal  Extremities: Normal range of motion, No clubbing, cyanosis or edema  Vascular: Peripheral pulses palpable 2+ bilaterally                          8.5    10.38 )-----------( 148      ( 01 Jun 2017 07:30 )             27.9     06-01    139  |  100  |  17  ----------------------------<  80  3.5   |  25  |  5.08<H>    Ca    8.9      01 Jun 2017 07:30  Phos  3.5     05-31  Mg     2.1     05-31      proBNP:   Lipid Profile:   HgA1c:   TSH:

## 2017-06-01 NOTE — PROGRESS NOTE ADULT - SUBJECTIVE AND OBJECTIVE BOX
CC: F/U for suspected PNA    Saw/spoke to patient. Patient improved today. Did not use bipap overnight, on face mask O2. Mental status improved. Family at bedside.    Allergies  No Known Allergies    ANTIMICROBIALS:  cefepime  IVPB 1000 every 24 hours  metroNIDAZOLE    Tablet 500 every 12 hours    PE:    Vital Signs Last 24 Hrs  T(C): 35.8, Max: 36.6 (05-31 @ 21:10)  T(F): 96.5, Max: 97.8 (05-31 @ 21:10)  HR: 96 (76 - 96)  BP: 95/48 (84/43 - 96/34)  BP(mean): --  RR: 18 (16 - 18)  SpO2: 98% (96% - 100%)    Gen: AOx3, NAD, fatigued  CV: S1+S2 normal, no murmurs, tachycardic  Resp: Decreasing crackles, face mask O2  Abd: Soft, nontender, +BS  Ext: No LE edema, no wounds    LABS:                          8.5    10.38 )-----------( 148      ( 01 Jun 2017 07:30 )             27.9       06-01    139  |  100  |  17  ----------------------------<  80  3.5   |  25  |  5.08<H>    Ca    8.9      01 Jun 2017 07:30  Phos  3.5     05-31  Mg     2.1     05-31      MICROBIOLOGY:  Vancomycin Level, Random: 20.1 ug/mL (06-01 @ 07:30)  Vancomycin Level, Random: 25.6 ug/mL (05-31 @ 18:15)    BLOOD PERIPHERAL  05-29 @ 04:10 --    NO ORGANISMS ISOLATED  NO ORGANISMS ISOLATED AT 48 HRS.    BLOOD  05-20 @ 20:53 --    NO ORGANISMS ISOLATED    RADIOLOGY:    CXR: IMPRESSION:   Unchanged CHF and/or pneumonia.

## 2017-06-02 LAB
BUN SERPL-MCNC: 21 MG/DL — SIGNIFICANT CHANGE UP (ref 7–23)
CALCIUM SERPL-MCNC: 9.2 MG/DL — SIGNIFICANT CHANGE UP (ref 8.4–10.5)
CHLORIDE SERPL-SCNC: 97 MMOL/L — LOW (ref 98–107)
CO2 SERPL-SCNC: 25 MMOL/L — SIGNIFICANT CHANGE UP (ref 22–31)
CREAT SERPL-MCNC: 6.4 MG/DL — HIGH (ref 0.5–1.3)
GLUCOSE SERPL-MCNC: 83 MG/DL — SIGNIFICANT CHANGE UP (ref 70–99)
HCT VFR BLD CALC: 28.3 % — LOW (ref 39–50)
HGB BLD-MCNC: 8.7 G/DL — LOW (ref 13–17)
INR BLD: 6.72 — CRITICAL HIGH (ref 0.88–1.17)
MCHC RBC-ENTMCNC: 28.2 PG — SIGNIFICANT CHANGE UP (ref 27–34)
MCHC RBC-ENTMCNC: 30.7 % — LOW (ref 32–36)
MCV RBC AUTO: 91.6 FL — SIGNIFICANT CHANGE UP (ref 80–100)
PLATELET # BLD AUTO: 160 K/UL — SIGNIFICANT CHANGE UP (ref 150–400)
PMV BLD: 11.5 FL — SIGNIFICANT CHANGE UP (ref 7–13)
POTASSIUM SERPL-MCNC: 3.8 MMOL/L — SIGNIFICANT CHANGE UP (ref 3.5–5.3)
POTASSIUM SERPL-SCNC: 3.8 MMOL/L — SIGNIFICANT CHANGE UP (ref 3.5–5.3)
PROTHROM AB SERPL-ACNC: 78.3 SEC — HIGH (ref 9.8–13.1)
RBC # BLD: 3.09 M/UL — LOW (ref 4.2–5.8)
RBC # FLD: 18 % — HIGH (ref 10.3–14.5)
SODIUM SERPL-SCNC: 136 MMOL/L — SIGNIFICANT CHANGE UP (ref 135–145)
VANCOMYCIN FLD-MCNC: 17.4 UG/ML — SIGNIFICANT CHANGE UP
WBC # BLD: 16.23 K/UL — HIGH (ref 3.8–10.5)
WBC # FLD AUTO: 16.23 K/UL — HIGH (ref 3.8–10.5)

## 2017-06-02 PROCEDURE — 99232 SBSQ HOSP IP/OBS MODERATE 35: CPT

## 2017-06-02 PROCEDURE — 71010: CPT | Mod: 26

## 2017-06-02 RX ORDER — ERYTHROPOIETIN 10000 [IU]/ML
10000 INJECTION, SOLUTION INTRAVENOUS; SUBCUTANEOUS
Qty: 0 | Refills: 0 | Status: DISCONTINUED | OUTPATIENT
Start: 2017-06-02 | End: 2017-06-06

## 2017-06-02 RX ADMIN — Medication 20.85 MICROGRAM(S)/KG/MIN: at 22:09

## 2017-06-02 RX ADMIN — MIDODRINE HYDROCHLORIDE 30 MILLIGRAM(S): 2.5 TABLET ORAL at 14:52

## 2017-06-02 RX ADMIN — SEVELAMER CARBONATE 800 MILLIGRAM(S): 2400 POWDER, FOR SUSPENSION ORAL at 12:24

## 2017-06-02 RX ADMIN — Medication 500 MILLIGRAM(S): at 22:10

## 2017-06-02 RX ADMIN — CEFEPIME 100 MILLIGRAM(S): 1 INJECTION, POWDER, FOR SOLUTION INTRAMUSCULAR; INTRAVENOUS at 22:09

## 2017-06-02 RX ADMIN — SEVELAMER CARBONATE 800 MILLIGRAM(S): 2400 POWDER, FOR SUSPENSION ORAL at 17:59

## 2017-06-02 RX ADMIN — ATORVASTATIN CALCIUM 20 MILLIGRAM(S): 80 TABLET, FILM COATED ORAL at 22:10

## 2017-06-02 RX ADMIN — MIDODRINE HYDROCHLORIDE 30 MILLIGRAM(S): 2.5 TABLET ORAL at 05:40

## 2017-06-02 RX ADMIN — Medication 75 MICROGRAM(S): at 05:40

## 2017-06-02 RX ADMIN — MIDODRINE HYDROCHLORIDE 30 MILLIGRAM(S): 2.5 TABLET ORAL at 22:10

## 2017-06-02 RX ADMIN — Medication 500 MILLIGRAM(S): at 09:08

## 2017-06-02 RX ADMIN — FINASTERIDE 5 MILLIGRAM(S): 5 TABLET, FILM COATED ORAL at 12:24

## 2017-06-02 RX ADMIN — Medication 20.85 MICROGRAM(S)/KG/MIN: at 12:30

## 2017-06-02 RX ADMIN — AMIODARONE HYDROCHLORIDE 200 MILLIGRAM(S): 400 TABLET ORAL at 05:40

## 2017-06-02 RX ADMIN — Medication 20.85 MICROGRAM(S)/KG/MIN: at 02:58

## 2017-06-02 NOTE — PROGRESS NOTE ADULT - SUBJECTIVE AND OBJECTIVE BOX
CC: F/U Leukocytosis    Called back to reassess for elevated WBC, worsening resp status. Saw/spoke to patient. Patient back on bipap. No fevers, but worsening respiratory status. Cough at baseline. No persistent diarrhea.    Allergies  No Known Allergies    ANTIMICROBIALS:  cefepime  IVPB 1000 every 24 hours  metroNIDAZOLE    Tablet 500 every 12 hours    PE:    Vital Signs Last 24 Hrs  T(C): 36.4, Max: 36.7 (06-01 @ 21:35)  T(F): 97.6, Max: 98 (06-01 @ 21:35)  HR: 84 (79 - 95)  BP: 97/57 (84/60 - 122/89)  BP(mean): --  RR: 18 (18 - 20)  SpO2: 98% (93% - 100%)    Gen: AOx1-2, fatigued appearing, able to communicate  CV: S1+S2 normal, no murmurs, nontachycardic  Resp: Diffuse crackles, on bipap, tachypneic  Abd: Soft, nontender, +BS  Ext: No LE edema, no wounds    LABS:                        8.7    16.23 )-----------( 160      ( 02 Jun 2017 12:45 )             28.3       06-02    136  |  97<L>  |  21  ----------------------------<  83  3.8   |  25  |  6.40<H>    Ca    9.2      02 Jun 2017 12:45  Phos  3.5     05-31  Mg     2.1     05-31      MICROBIOLOGY:  Vancomycin Level, Random: 17.4 ug/mL (06-02 @ 12:45)    BLOOD PERIPHERAL  05-29 @ 04:10 --    NO ORGANISMS ISOLATED  NO ORGANISMS ISOLATED AT 48 HRS.    BLOOD  05-20 @ 20:53 --    NO ORGANISMS ISOLATED    RADIOLOGY:    No new available

## 2017-06-02 NOTE — PROGRESS NOTE ADULT - ASSESSMENT
61 yo M with acute on chronic severe systolic CHF (EF 19%), ESRD, DM2, A fib:  - HCAP - worsening respiratory status and increasing WBC, will continue antibiotics as per ID, CT chest, BiPAP   - Acute on chronic systolic CHF - continue inotropic support as per Cardio, HD for maximum fluid removal  - ESRD - continue HD as per Renal, continue Midodrine. Renal to address possibility of outpatient HD, as pt refusing any palliative care options.  - A fib - continue Amio, hold Coumadin due to supratheraputic INR. No need to reverse as no active bleeding.  - DM2 - controlled, continue with ISS  - Prognosis very poor, explained to patient and family, however, patient is refusing palliative input at this time and wants aggressive life support measures.

## 2017-06-02 NOTE — PROGRESS NOTE ADULT - PROBLEM SELECTOR PLAN 1
Pt unable to tolerate PUF yesterday due to hypotension.   Will plan for HD tomorrow. BP still borderline.   Despite near daily dialysis/uf treatment to help optimize fluid status, pt continues to have pulm edema and dyspnea. Dialysis not adequate to maintain euvolemia, in setting of severe, dobutamine dependent CHF. Very poor prognosis.   Moreover, it will not be logistically possible to have permanent 4 times weekly HD treatments in outpt HD unit.  f/u w/pall care. Had long discussion regarding current medical condition and poor prognosis with pt, his wife, and son yesterday evening. They are not ready to change medical plan at this time. Will f/u with his wife again today.   c/w renal diet, fluid restriction.

## 2017-06-02 NOTE — PROGRESS NOTE ADULT - ASSESSMENT
61 y/o male with a PMHx of NICM with severe LV dysfunction (EF 19%) S/P Biotronic ICD placement on Dobutamine gtt via chronic left upper extremity PICC line, ESRD on HD M/W/F, atrial fibrillation on Coumadin, COPD on 4L home O2, HLD, DM, chronic hypotension on Midodrine, presents to ED S/P syncopal episode. Concern for new weakness, SOB, fever. With onset of green sputum, consider possibility of new onset HCAP. Multifactorial respiratory distress on bipap. Would be concerned for fluid overload in this patient with EF 19%, avoid zosyn if possible. Worsening resp distress 2/2 to fluid overload vs new aspiration?  - Cefepime 1gram q 24  - Flagyl 500 q 12  - Continue abx for now  - CHF per primary team  - If resp status non-improving would send CT Chest; can consider broadening to carbapenem, but notable that these agents have high sodium content and would worsen CHF  - Low threshold for MICU kvng [place] MD  Pager [phone]  After 5pm and on weekends call [phone]

## 2017-06-02 NOTE — PROGRESS NOTE ADULT - SUBJECTIVE AND OBJECTIVE BOX
CCHIEF COMPLAINT:Patient is a 62y old  Male who presents with a chief complaint of SOB due to terminal heart failure, now with HCAP, worsening respiratory status      No Known Allergies      PAST MEDICAL & SURGICAL HISTORY:  Chronic hypotension  AF (atrial fibrillation)  COPD (chronic obstructive pulmonary disease): 4L home O2  HLD (hyperlipidemia)  DM (diabetes mellitus)  ESRD (end stage renal disease) on dialysis  BPH (benign prostatic hypertrophy)  Myocardial infarction: 10/2011  Chronic renal insufficiency  Gout  Dyslipidemia  Diabetes mellitus  CHF (congestive heart failure)  AICD (automatic cardioverter/defibrillator) present: Biotronic - placed 9/11/09  H/O coronary angiogram      FAMILY HISTORY:  No pertinent family history in first degree relatives      REVIEW OF SYSTEMS:  CONSTITUTIONAL: No fever, weight loss, or fatigue  EYES: No eye pain, visual disturbances, or discharge  NECK: No pain or stiffness  RESPIRATORY: Cough with green sputum improving, SOB worsening, no wheezing  CARDIOVASCULAR: No chest pain, palpitations, passing out, dizziness, or leg swelling  GASTROINTESTINAL: No abdominal or epigastric pain. No nausea, vomiting, or hematemesis; No diarrhea or constipation. No melena or hematochezia.  GENITOURINARY: No dysuria, frequency, hematuria, or incontinence  NEUROLOGICAL: No headaches, memory loss, loss of strength, numbness, or tremors  SKIN: No itching, burning, rashes, or lesions   LYMPH Nodes: No enlarged glands  ENDOCRINE: No heat or cold intolerance; No hair loss  MUSCULOSKELETAL: No joint pain or swelling; No muscle, back, or extremity pain    Medications:  MEDICATIONS  (STANDING):  finasteride 5milliGRAM(s) Oral daily  amiodarone    Tablet 200milliGRAM(s) Oral daily  atorvastatin 20milliGRAM(s) Oral at bedtime  docusate sodium 100milliGRAM(s) Oral daily  midodrine 30milliGRAM(s) Oral every 8 hours  sevelamer hydrochloride 800milliGRAM(s) Oral three times a day with meals  levothyroxine 75MICROGram(s) Oral daily  insulin lispro (HumaLOG) corrective regimen sliding scale  SubCutaneous three times a day before meals  insulin lispro (HumaLOG) corrective regimen sliding scale  SubCutaneous at bedtime  dextrose 5%. 1000milliLiter(s) IV Continuous <Continuous>  dextrose 50% Injectable 12.5Gram(s) IV Push once  dextrose 50% Injectable 25Gram(s) IV Push once  dextrose 50% Injectable 25Gram(s) IV Push once  DOBUTamine Infusion 7MICROgram(s)/kG/Min IV Continuous <Continuous>  acetaminophen   Tablet. 650milliGRAM(s) Oral once  cefepime  IVPB 1000milliGRAM(s) IV Intermittent every 24 hours  metroNIDAZOLE    Tablet 500milliGRAM(s) Oral every 12 hours  epoetin alba Injectable 81502Khmp(s) IV Push <User Schedule>    MEDICATIONS  (PRN):  dextrose Gel 1Dose(s) Oral once PRN Blood Glucose LESS THAN 70 milliGRAM(s)/deciliter  glucagon  Injectable 1milliGRAM(s) IntraMuscular once PRN Glucose LESS THAN 70 milligrams/deciliter  acetaminophen   Tablet 650milliGRAM(s) Oral every 6 hours PRN For Temp greater than 38 C (100.4 F)  acetaminophen    Suspension. 650milliGRAM(s) Oral every 6 hours PRN Moderate Pain (4 - 6)  artificial tears (preservative free) Ophthalmic Solution 1Drop(s) Both EYES three times a day PRN Dry Eyes  sodium chloride 0.65% Nasal 1Spray(s) Both Nostrils four times a day PRN Nasal Congestion    	    PHYSICAL EXAM:  T(C): 36.4, Max: 36.7 (06-01 @ 21:35)  HR: 84 (80 - 95)  BP: 97/57 (84/60 - 122/89)  RR: 18 (18 - 18)  SpO2: 98% (93% - 100%)  Wt(kg): --  I&O's Summary  I & Os for 24h ending 02 Jun 2017 07:00  =============================================  IN: 740 ml / OUT: 326 ml / NET: 414 ml    I & Os for current day (as of 02 Jun 2017 16:19)  =============================================  IN: 200 ml / OUT: 0 ml / NET: 200 ml      Appearance: Normal	  HEENT:   Normal oral mucosa, PERRL, EOMI	  Lymphatic: No lymphadenopathy  Cardiovascular: Normal S1 S2, No JVD, No murmurs, No edema  Respiratory: Bibasilar crackles	  Psychiatry: A & O x 3, Mood & affect appropriate  Gastrointestinal:  Soft, Non-tender, + BS	  Skin: No rashes, No ecchymoses, No cyanosis	  Neurologic: Non-focal  Extremities: Normal range of motion, No clubbing, cyanosis or edema  Vascular: Peripheral pulses palpable 2+ bilaterally                                8.7    16.23 )-----------( 160      ( 02 Jun 2017 12:45 )             28.3     06-02    136  |  97<L>  |  21  ----------------------------<  83  3.8   |  25  |  6.40<H>    Ca    9.2      02 Jun 2017 12:45  Phos  3.5     05-31  Mg     2.1     05-31

## 2017-06-02 NOTE — PROGRESS NOTE ADULT - SUBJECTIVE AND OBJECTIVE BOX
Pt seen and examined at bedside  Back on Bipap. Dyspnea unchanged.   Denies chest pain.     Allergies:  No Known Allergies    Hospital Medications:   MEDICATIONS  (STANDING):  finasteride 5milliGRAM(s) Oral daily  amiodarone    Tablet 200milliGRAM(s) Oral daily  atorvastatin 20milliGRAM(s) Oral at bedtime  docusate sodium 100milliGRAM(s) Oral daily  midodrine 30milliGRAM(s) Oral every 8 hours  sevelamer hydrochloride 800milliGRAM(s) Oral three times a day with meals  levothyroxine 75MICROGram(s) Oral daily  insulin lispro (HumaLOG) corrective regimen sliding scale  SubCutaneous three times a day before meals  insulin lispro (HumaLOG) corrective regimen sliding scale  SubCutaneous at bedtime  dextrose 5%. 1000milliLiter(s) IV Continuous <Continuous>  dextrose 50% Injectable 12.5Gram(s) IV Push once  dextrose 50% Injectable 25Gram(s) IV Push once  dextrose 50% Injectable 25Gram(s) IV Push once  DOBUTamine Infusion 7MICROgram(s)/kG/Min IV Continuous <Continuous>  acetaminophen   Tablet. 650milliGRAM(s) Oral once  epoetin alba Injectable 4000Unit(s) IV Push <User Schedule>  cefepime  IVPB 1000milliGRAM(s) IV Intermittent every 24 hours  metroNIDAZOLE    Tablet 500milliGRAM(s) Oral every 12 hours    VITALS:  T(F): 97.6, Max: 98 (06-01 @ 21:35)  HR: 86  BP: 97/57  RR: 18  SpO2: 99%  Wt(kg): --  I & Os for 24h ending 06-02 @ 07:00  =============================================  IN: 740 ml / OUT: 326 ml / NET: 414 ml    I & Os for current day (as of 06-02 @ 14:11)  =============================================  IN: 140 ml / OUT: 0 ml / NET: 140 ml      PHYSICAL EXAM:  Constitutional: NAD.  HEENT: anicteric sclera, +pallor  Neck: +JVD  Respiratory: Bibasilar crackles   Cardiovascular: S1, S2, RRR  Gastrointestinal: BS+, soft, NT. Distended  Extremities: trace peripheral edema in b/l LE   Neurological: A/O x 3, no focal deficits  Psychiatric: Normal mood, normal affect  : No CVA tenderness. No rosa.   Skin: No rashes  Vascular Access: RIJ Tunneled cath    LABS:  06-01    139  |  100  |  17  ----------------------------<  80  3.5   |  25  |  5.08<H>    Ca    8.9      01 Jun 2017 07:30  Phos  3.5     05-31  Mg     2.1     05-31      Creatinine Trend: 5.08 <--, 6.76 <--, 5.01 <--, 6.37 <--, 5.44 <--, 4.36 <--                        8.7    16.23 )-----------( 160      ( 02 Jun 2017 12:45 )             28.3     Urine Studies:      RADIOLOGY & ADDITIONAL STUDIES:

## 2017-06-03 DIAGNOSIS — R25.2 CRAMP AND SPASM: ICD-10-CM

## 2017-06-03 LAB
ALBUMIN SERPL ELPH-MCNC: 2.3 G/DL — LOW (ref 3.3–5)
ALP SERPL-CCNC: 134 U/L — HIGH (ref 40–120)
ALT FLD-CCNC: 16 U/L — SIGNIFICANT CHANGE UP (ref 4–41)
APTT BLD: 81 SEC — HIGH (ref 27.5–37.4)
AST SERPL-CCNC: 42 U/L — HIGH (ref 4–40)
BACTERIA BLD CULT: SIGNIFICANT CHANGE UP
BILIRUB SERPL-MCNC: 3.2 MG/DL — HIGH (ref 0.2–1.2)
BUN SERPL-MCNC: 24 MG/DL — HIGH (ref 7–23)
BUN SERPL-MCNC: 24 MG/DL — HIGH (ref 7–23)
CALCIUM SERPL-MCNC: 9.4 MG/DL — SIGNIFICANT CHANGE UP (ref 8.4–10.5)
CALCIUM SERPL-MCNC: 9.4 MG/DL — SIGNIFICANT CHANGE UP (ref 8.4–10.5)
CHLORIDE SERPL-SCNC: 95 MMOL/L — LOW (ref 98–107)
CHLORIDE SERPL-SCNC: 95 MMOL/L — LOW (ref 98–107)
CO2 SERPL-SCNC: 24 MMOL/L — SIGNIFICANT CHANGE UP (ref 22–31)
CO2 SERPL-SCNC: 24 MMOL/L — SIGNIFICANT CHANGE UP (ref 22–31)
CREAT SERPL-MCNC: 7.27 MG/DL — HIGH (ref 0.5–1.3)
CREAT SERPL-MCNC: 7.27 MG/DL — HIGH (ref 0.5–1.3)
GLUCOSE SERPL-MCNC: 126 MG/DL — HIGH (ref 70–99)
GLUCOSE SERPL-MCNC: 126 MG/DL — HIGH (ref 70–99)
HCT VFR BLD CALC: 26.6 % — LOW (ref 39–50)
HGB BLD-MCNC: 8.2 G/DL — LOW (ref 13–17)
INR BLD: 8.45 — CRITICAL HIGH (ref 0.88–1.17)
INR BLD: 8.71 — CRITICAL HIGH (ref 0.88–1.17)
INR BLD: 9.15 — CRITICAL HIGH (ref 0.88–1.17)
MCHC RBC-ENTMCNC: 28.2 PG — SIGNIFICANT CHANGE UP (ref 27–34)
MCHC RBC-ENTMCNC: 30.8 % — LOW (ref 32–36)
MCV RBC AUTO: 91.4 FL — SIGNIFICANT CHANGE UP (ref 80–100)
PLATELET # BLD AUTO: 156 K/UL — SIGNIFICANT CHANGE UP (ref 150–400)
PMV BLD: 10.9 FL — SIGNIFICANT CHANGE UP (ref 7–13)
POTASSIUM SERPL-MCNC: 4 MMOL/L — SIGNIFICANT CHANGE UP (ref 3.5–5.3)
POTASSIUM SERPL-MCNC: 4 MMOL/L — SIGNIFICANT CHANGE UP (ref 3.5–5.3)
POTASSIUM SERPL-SCNC: 4 MMOL/L — SIGNIFICANT CHANGE UP (ref 3.5–5.3)
POTASSIUM SERPL-SCNC: 4 MMOL/L — SIGNIFICANT CHANGE UP (ref 3.5–5.3)
PROT SERPL-MCNC: 6.9 G/DL — SIGNIFICANT CHANGE UP (ref 6–8.3)
PROTHROM AB SERPL-ACNC: 102 SEC — HIGH (ref 9.8–13.1)
PROTHROM AB SERPL-ACNC: 107.2 SEC — HIGH (ref 9.8–13.1)
PROTHROM AB SERPL-ACNC: 98.8 SEC — HIGH (ref 9.8–13.1)
RBC # BLD: 2.91 M/UL — LOW (ref 4.2–5.8)
RBC # FLD: 18.2 % — HIGH (ref 10.3–14.5)
SODIUM SERPL-SCNC: 136 MMOL/L — SIGNIFICANT CHANGE UP (ref 135–145)
SODIUM SERPL-SCNC: 136 MMOL/L — SIGNIFICANT CHANGE UP (ref 135–145)
SPECIMEN SOURCE: SIGNIFICANT CHANGE UP
VANCOMYCIN FLD-MCNC: 19.2 UG/ML — SIGNIFICANT CHANGE UP
WBC # BLD: 17.8 K/UL — HIGH (ref 3.8–10.5)
WBC # FLD AUTO: 17.8 K/UL — HIGH (ref 3.8–10.5)

## 2017-06-03 PROCEDURE — 99232 SBSQ HOSP IP/OBS MODERATE 35: CPT | Mod: GC

## 2017-06-03 RX ORDER — LEVETIRACETAM 250 MG/1
500 TABLET, FILM COATED ORAL DAILY
Qty: 0 | Refills: 0 | Status: DISCONTINUED | OUTPATIENT
Start: 2017-06-03 | End: 2017-06-23

## 2017-06-03 RX ORDER — ERTAPENEM SODIUM 1 G/1
500 INJECTION, POWDER, LYOPHILIZED, FOR SOLUTION INTRAMUSCULAR; INTRAVENOUS EVERY 24 HOURS
Qty: 0 | Refills: 0 | Status: DISCONTINUED | OUTPATIENT
Start: 2017-06-03 | End: 2017-06-07

## 2017-06-03 RX ORDER — LEVETIRACETAM 250 MG/1
250 TABLET, FILM COATED ORAL DAILY
Qty: 0 | Refills: 0 | Status: DISCONTINUED | OUTPATIENT
Start: 2017-06-03 | End: 2017-06-23

## 2017-06-03 RX ORDER — PHYTONADIONE (VIT K1) 5 MG
5 TABLET ORAL ONCE
Qty: 0 | Refills: 0 | Status: COMPLETED | OUTPATIENT
Start: 2017-06-03 | End: 2017-06-03

## 2017-06-03 RX ADMIN — Medication 5 MILLIGRAM(S): at 23:55

## 2017-06-03 RX ADMIN — FINASTERIDE 5 MILLIGRAM(S): 5 TABLET, FILM COATED ORAL at 12:02

## 2017-06-03 RX ADMIN — ERYTHROPOIETIN 10000 UNIT(S): 10000 INJECTION, SOLUTION INTRAVENOUS; SUBCUTANEOUS at 12:02

## 2017-06-03 RX ADMIN — Medication 500 MILLIGRAM(S): at 05:27

## 2017-06-03 RX ADMIN — MIDODRINE HYDROCHLORIDE 30 MILLIGRAM(S): 2.5 TABLET ORAL at 05:27

## 2017-06-03 RX ADMIN — ERTAPENEM SODIUM 110 MILLIGRAM(S): 1 INJECTION, POWDER, LYOPHILIZED, FOR SOLUTION INTRAMUSCULAR; INTRAVENOUS at 15:14

## 2017-06-03 RX ADMIN — Medication 650 MILLIGRAM(S): at 14:08

## 2017-06-03 RX ADMIN — Medication 20.85 MICROGRAM(S)/KG/MIN: at 21:35

## 2017-06-03 RX ADMIN — Medication 20.85 MICROGRAM(S)/KG/MIN: at 11:35

## 2017-06-03 RX ADMIN — Medication 650 MILLIGRAM(S): at 13:08

## 2017-06-03 RX ADMIN — Medication 1: at 18:10

## 2017-06-03 RX ADMIN — Medication 5 MILLIGRAM(S): at 14:47

## 2017-06-03 RX ADMIN — SEVELAMER CARBONATE 800 MILLIGRAM(S): 2400 POWDER, FOR SUSPENSION ORAL at 17:49

## 2017-06-03 RX ADMIN — Medication 75 MICROGRAM(S): at 05:27

## 2017-06-03 RX ADMIN — Medication 650 MILLIGRAM(S): at 00:43

## 2017-06-03 RX ADMIN — AMIODARONE HYDROCHLORIDE 200 MILLIGRAM(S): 400 TABLET ORAL at 05:27

## 2017-06-03 RX ADMIN — SEVELAMER CARBONATE 800 MILLIGRAM(S): 2400 POWDER, FOR SUSPENSION ORAL at 12:02

## 2017-06-03 RX ADMIN — LEVETIRACETAM 400 MILLIGRAM(S): 250 TABLET, FILM COATED ORAL at 14:47

## 2017-06-03 RX ADMIN — ATORVASTATIN CALCIUM 20 MILLIGRAM(S): 80 TABLET, FILM COATED ORAL at 21:35

## 2017-06-03 RX ADMIN — Medication 650 MILLIGRAM(S): at 01:27

## 2017-06-03 RX ADMIN — SEVELAMER CARBONATE 800 MILLIGRAM(S): 2400 POWDER, FOR SUSPENSION ORAL at 08:39

## 2017-06-03 RX ADMIN — MIDODRINE HYDROCHLORIDE 30 MILLIGRAM(S): 2.5 TABLET ORAL at 14:15

## 2017-06-03 RX ADMIN — MIDODRINE HYDROCHLORIDE 30 MILLIGRAM(S): 2.5 TABLET ORAL at 21:35

## 2017-06-03 NOTE — PROGRESS NOTE ADULT - PROBLEM SELECTOR PLAN 1
Will need to obtain EEG. Will see of can be done while pt still on BIPAP, otherwise will have to wait until pt is more stable.  Will start pt on Keppra renally dosed, 500mg daily, 250mf after dialysis, which will help for either seizure or asterixis.

## 2017-06-03 NOTE — PROGRESS NOTE ADULT - ASSESSMENT
61 yo M with acute on chronic severe systolic CHF (EF 19%), ESRD, DM2, A fib:  - HCAP - stable respiratory status and increasing WBC, will change to Ertapenem, follow up ID, check sputum culture, CT chest, BiPAP   - Acute on chronic systolic CHF - continue inotropic support as per Cardio, HD for maximum fluid removal  - ESRD - continue HD as per Renal, continue Midodrine. Renal to address possibility of outpatient HD, as pt refusing any palliative care options.  - A fib - continue Amio, hold Coumadin due to supratheraputic INR. Reverse with Subcutaneous and PO VIt K.  - DM2 - controlled, continue with ISS  - Prognosis very poor, explained to patient and family, however, patient is refusing palliative input at this time and wants aggressive life support measures.

## 2017-06-03 NOTE — CHART NOTE - NSCHARTNOTEFT_GEN_A_CORE
discussed with lab INR 8.4 tonight . s/p vitamin K 5mg oral this am . Discussed with attending - will give additional 5mg oral tonight . Recheck INR in am

## 2017-06-03 NOTE — PROGRESS NOTE ADULT - SUBJECTIVE AND OBJECTIVE BOX
Lab results received from this am - .o  INR 8.7  lab test repeated. Repeat values - .2  INR 9.15  Call placed to Dr Portillo who is covering for Dr Irvin. Order received for Vitamin K- oral dose 10mg and subcutaneous dose 2 mg. Call received from pharmacy about dosing. Subcutaneous dosing available for this medication. Recommendations from pharmacy to decrease oral dose to 5mg . Will give the 5mg dose with follow up PT/INR level If needed additional dose will be given. Continue to monitor.

## 2017-06-03 NOTE — PROGRESS NOTE ADULT - ATTENDING COMMENTS
This is a late entry. Patient was seen and examined on 6/3/17. Patient with jerking movements and asterixis of bilateral upper and lower extremities with maintenance of consciousness. These episodes are no longer accompanied by coughing and are occurring spontaneously. Patient is receiving dialysis at this time. It is unlikely that these events are seizures. Suspect rather that he is having asterixis from renal failure. Recommend renally dosed keppra to help with abnormal movements. Will not be able to completely suppress abnormal movements but may help. Depakote is an alternative, but would affect metabolism of coumadin, which is not ideal.

## 2017-06-03 NOTE — PROGRESS NOTE ADULT - SUBJECTIVE AND OBJECTIVE BOX
CCHIEF COMPLAINT:Patient is a 62y old  Male who presents with a chief complaint of SOB due to terminal heart failure, now with HCAP, worsening respiratory status      No Known Allergies      PAST MEDICAL & SURGICAL HISTORY:  Chronic hypotension  AF (atrial fibrillation)  COPD (chronic obstructive pulmonary disease): 4L home O2  HLD (hyperlipidemia)  DM (diabetes mellitus)  ESRD (end stage renal disease) on dialysis  BPH (benign prostatic hypertrophy)  Myocardial infarction: 10/2011  Chronic renal insufficiency  Gout  Dyslipidemia  Diabetes mellitus  CHF (congestive heart failure)  AICD (automatic cardioverter/defibrillator) present: Biotronic - placed 9/11/09  H/O coronary angiogram      FAMILY HISTORY:  No pertinent family history in first degree relatives      REVIEW OF SYSTEMS:  CONSTITUTIONAL: No fever, weight loss, or fatigue  EYES: No eye pain, visual disturbances, or discharge  NECK: No pain or stiffness  RESPIRATORY: Cough with green sputum improving, SOB stable, no wheezing  CARDIOVASCULAR: No chest pain, palpitations, passing out, dizziness, or leg swelling  GASTROINTESTINAL: No abdominal or epigastric pain. No nausea, vomiting, or hematemesis; No diarrhea or constipation. No melena or hematochezia.  GENITOURINARY: No dysuria, frequency, hematuria, or incontinence  NEUROLOGICAL: No headaches, memory loss, loss of strength, numbness, or tremors  SKIN: No itching, burning, rashes, or lesions   LYMPH Nodes: No enlarged glands  ENDOCRINE: No heat or cold intolerance; No hair loss  MUSCULOSKELETAL: No joint pain or swelling; No muscle, back, or extremity pain    Medications:  MEDICATIONS  (STANDING):  finasteride 5milliGRAM(s) Oral daily  amiodarone    Tablet 200milliGRAM(s) Oral daily  atorvastatin 20milliGRAM(s) Oral at bedtime  docusate sodium 100milliGRAM(s) Oral daily  midodrine 30milliGRAM(s) Oral every 8 hours  sevelamer hydrochloride 800milliGRAM(s) Oral three times a day with meals  levothyroxine 75MICROGram(s) Oral daily  insulin lispro (HumaLOG) corrective regimen sliding scale  SubCutaneous three times a day before meals  insulin lispro (HumaLOG) corrective regimen sliding scale  SubCutaneous at bedtime  dextrose 5%. 1000milliLiter(s) IV Continuous <Continuous>  dextrose 50% Injectable 12.5Gram(s) IV Push once  dextrose 50% Injectable 25Gram(s) IV Push once  dextrose 50% Injectable 25Gram(s) IV Push once  DOBUTamine Infusion 7MICROgram(s)/kG/Min IV Continuous <Continuous>  acetaminophen   Tablet. 650milliGRAM(s) Oral once  cefepime  IVPB 1000milliGRAM(s) IV Intermittent every 24 hours  metroNIDAZOLE    Tablet 500milliGRAM(s) Oral every 12 hours  epoetin alba Injectable 92220Saoh(s) IV Push <User Schedule>  phytonadione   Solution 5milliGRAM(s) Oral once  levETIRAcetam  IVPB 500milliGRAM(s) IV Intermittent daily  levETIRAcetam  IVPB 250milliGRAM(s) IV Intermittent daily    MEDICATIONS  (PRN):  dextrose Gel 1Dose(s) Oral once PRN Blood Glucose LESS THAN 70 milliGRAM(s)/deciliter  glucagon  Injectable 1milliGRAM(s) IntraMuscular once PRN Glucose LESS THAN 70 milligrams/deciliter  acetaminophen   Tablet 650milliGRAM(s) Oral every 6 hours PRN For Temp greater than 38 C (100.4 F)  acetaminophen    Suspension. 650milliGRAM(s) Oral every 6 hours PRN Moderate Pain (4 - 6)  artificial tears (preservative free) Ophthalmic Solution 1Drop(s) Both EYES three times a day PRN Dry Eyes  sodium chloride 0.65% Nasal 1Spray(s) Both Nostrils four times a day PRN Nasal Congestion    	    PHYSICAL EXAM:  T(C): 35.9, Max: 36.7 (06-02 @ 21:12)  HR: 100 (77 - 100)  BP: 158/68 (92/52 - 158/68)  RR: 16 (16 - 18)  SpO2: 99% (97% - 99%)  Wt(kg): --  I&O's Summary  I & Os for 24h ending 03 Jun 2017 07:00  =============================================  IN: 510 ml / OUT: 0 ml / NET: 510 ml    I & Os for current day (as of 03 Jun 2017 14:31)  =============================================  IN: 740 ml / OUT: 2200 ml / NET: -1460 ml      Appearance: Normal	  HEENT:   Normal oral mucosa, PERRL, EOMI	  Lymphatic: No lymphadenopathy  Cardiovascular: Normal S1 S2, No JVD, No murmurs, No edema  Respiratory: Lungs clear to auscultation	  Psychiatry: A & O x 3, Mood & affect appropriate  Gastrointestinal:  Soft, Non-tender, + BS	  Skin: No rashes, No ecchymoses, No cyanosis	  Neurologic: Non-focal  Extremities: Normal range of motion, No clubbing, cyanosis or edema  Vascular: Peripheral pulses palpable 2+ bilaterally                              8.2    17.80 )-----------( 156      ( 03 Jun 2017 11:00 )             26.6     06-03    136  |  95<L>  |  24<H>  ----------------------------<  126<H>  4.0   |  24  |  7.27<H>    Ca    9.4      03 Jun 2017 11:00    TPro  6.9  /  Alb  2.3<L>  /  TBili  3.2<H>  /  DBili  x   /  AST  42<H>  /  ALT  16  /  AlkPhos  134<H>  06-03

## 2017-06-03 NOTE — PROGRESS NOTE ADULT - PROBLEM SELECTOR PLAN 1
completed HD today, net uf 1440 ml removed. BP still borderline.   Despite near daily dialysis/uf treatment to help optimize fluid status, pt continues to have pulm edema and dyspnea. Dialysis not adequate to maintain euvolemia, in setting of severe, dobutamine dependent CHF. Very poor prognosis.   Moreover, it will not be logistically possible to have permanent 4 times weekly HD treatments in outpt HD unit.  f/u w/pall care. Had long discussion regarding current medical condition and poor prognosis with pt, his wife, and son yesterday evening. They are not ready to change medical plan at this time. Will f/u with his wife again today.   c/w renal diet, fluid restriction. next routine hd 6/5/17  c/w midodrine  Despite near daily dialysis/uf treatment to help optimize fluid status, pt continues to have pulm edema and dyspnea. Dialysis not adequate to maintain euvolemia, in setting of severe, dobutamine dependent CHF. Very poor prognosis.   Moreover, it will not be logistically possible to have permanent 4 times weekly HD treatments in outpt HD unit.  f/u w/pall care. Had long discussion regarding current medical condition and poor prognosis with pt, his wife, and son yesterday evening. They are not ready to change medical plan at this time. Will f/u with his wife again today.   c/w renal diet, fluid restriction.

## 2017-06-03 NOTE — PROGRESS NOTE ADULT - SUBJECTIVE AND OBJECTIVE BOX
Neurology Follow up note      Subjective: Interval History - Having increased jerking movements of b/l upper and lower extremities. Weakness on right side greater than left.     Objective:   Vital Signs Last 24 Hrs  T(C): 35.9, Max: 36.7 (06-02 @ 21:12)  T(F): 96.6, Max: 98 (06-02 @ 21:12)  HR: 100 (77 - 100)  BP: 158/68 (92/52 - 158/68)  BP(mean): --  RR: 16 (16 - 18)  SpO2: 99% (97% - 99%)    General Exam:   General appearance: No acute distress, on BIPAP, undergoing bedside hemodialysis               Neurological Exam:  Mental Status: Orientated to self, date and place.  Attention intact.  Speech exam limited by BIPAP mask.  Following simple commands.  Cranial Nerves: CN I - not tested.  PERRL, EOMI, VFF, no nystagmus or diplopia.  CN V1-3 intact to light touch.    Motor:   Tone: normal.                  Strength:   Left shoulder shrug 0/5, deltoid 1/5 bicep 4+/5 tricep 4+/5  5/5, Right shoulder shrug 5/5, deltoid 5-/5, bicep 5/5 tricep 5/5  5/5  Left hip flex 2/5 ext 3/5 knee flex 3/5 ext 3/5, Right hip flex 4+/5 ext 4/5 knee flex 4+/5 ext 4+/5                 Tremor: b/l UE and LE intermittent irregularly timed jerking/shaking. Flapping postural tremor.    Sensation: intact to light touch, pinprick, vibration and proprioception    Deep Tendon Reflexes: 1+ bilateral biceps, triceps, brachioradialis, knee and ankle  Toes flexor bilaterally    Gait: not examined as pt medically unstable.    Other:    06-03    136  |  95<L>  |  24<H>  ----------------------------<  126<H>  4.0   |  24  |  7.27<H>    Ca    9.4      03 Jun 2017 11:00    TPro  6.9  /  Alb  2.3<L>  /  TBili  3.2<H>  /  DBili  x   /  AST  42<H>  /  ALT  16  /  AlkPhos  134<H>  06-03 06-03    136  |  95<L>  |  24<H>  ----------------------------<  126<H>  4.0   |  24  |  7.27<H>    Ca    9.4      03 Jun 2017 11:00    TPro  6.9  /  Alb  2.3<L>  /  TBili  3.2<H>  /  DBili  x   /  AST  42<H>  /  ALT  16  /  AlkPhos  134<H>  06-03    LIVER FUNCTIONS - ( 03 Jun 2017 11:00 )  Alb: 2.3 g/dL / Pro: 6.9 g/dL / ALK PHOS: 134 u/L / ALT: 16 u/L / AST: 42 u/L / GGT: x                                 8.2    17.80 )-----------( 156      ( 03 Jun 2017 11:00 )             26.6         EEG: pending      MEDICATIONS  (STANDING):  finasteride 5milliGRAM(s) Oral daily  amiodarone    Tablet 200milliGRAM(s) Oral daily  atorvastatin 20milliGRAM(s) Oral at bedtime  docusate sodium 100milliGRAM(s) Oral daily  midodrine 30milliGRAM(s) Oral every 8 hours  sevelamer hydrochloride 800milliGRAM(s) Oral three times a day with meals  levothyroxine 75MICROGram(s) Oral daily  insulin lispro (HumaLOG) corrective regimen sliding scale  SubCutaneous three times a day before meals  insulin lispro (HumaLOG) corrective regimen sliding scale  SubCutaneous at bedtime  dextrose 5%. 1000milliLiter(s) IV Continuous <Continuous>  dextrose 50% Injectable 12.5Gram(s) IV Push once  dextrose 50% Injectable 25Gram(s) IV Push once  dextrose 50% Injectable 25Gram(s) IV Push once  DOBUTamine Infusion 7MICROgram(s)/kG/Min IV Continuous <Continuous>  acetaminophen   Tablet. 650milliGRAM(s) Oral once  cefepime  IVPB 1000milliGRAM(s) IV Intermittent every 24 hours  metroNIDAZOLE    Tablet 500milliGRAM(s) Oral every 12 hours  epoetin alba Injectable [id number](s) IV Push <User Schedule>  levETIRAcetam  IVPB 500milliGRAM(s) IV Intermittent daily  levETIRAcetam  IVPB 250milliGRAM(s) IV Intermittent daily    MEDICATIONS  (PRN):  dextrose Gel 1Dose(s) Oral once PRN Blood Glucose LESS THAN 70 milliGRAM(s)/deciliter  glucagon  Injectable 1milliGRAM(s) IntraMuscular once PRN Glucose LESS THAN 70 milligrams/deciliter  acetaminophen   Tablet 650milliGRAM(s) Oral every 6 hours PRN For Temp greater than 38 C (100.4 F)  acetaminophen    Suspension. 650milliGRAM(s) Oral every 6 hours PRN Moderate Pain (4 - 6)  artificial tears (preservative free) Ophthalmic Solution 1Drop(s) Both EYES three times a day PRN Dry Eyes  sodium chloride 0.65% Nasal 1Spray(s) Both Nostrils four times a day PRN Nasal Congestion

## 2017-06-03 NOTE — PROGRESS NOTE ADULT - SUBJECTIVE AND OBJECTIVE BOX
Pt seen and examined at bedside    Subjective: on Bipap. Dyspnea unchanged.   Denies chest pain.     Allergies:  No Known Allergies    Hospital Medications:   MEDICATIONS  (STANDING):  finasteride 5milliGRAM(s) Oral daily  amiodarone    Tablet 200milliGRAM(s) Oral daily  atorvastatin 20milliGRAM(s) Oral at bedtime  docusate sodium 100milliGRAM(s) Oral daily  midodrine 30milliGRAM(s) Oral every 8 hours  sevelamer hydrochloride 800milliGRAM(s) Oral three times a day with meals  levothyroxine 75MICROGram(s) Oral daily  insulin lispro (HumaLOG) corrective regimen sliding scale  SubCutaneous three times a day before meals  insulin lispro (HumaLOG) corrective regimen sliding scale  SubCutaneous at bedtime  dextrose 5%. 1000milliLiter(s) IV Continuous <Continuous>  dextrose 50% Injectable 12.5Gram(s) IV Push once  dextrose 50% Injectable 25Gram(s) IV Push once  dextrose 50% Injectable 25Gram(s) IV Push once  DOBUTamine Infusion 7MICROgram(s)/kG/Min IV Continuous <Continuous>  acetaminophen   Tablet. 650milliGRAM(s) Oral once  epoetin alba Injectable 4000Unit(s) IV Push <User Schedule>  cefepime  IVPB 1000milliGRAM(s) IV Intermittent every 24 hours  metroNIDAZOLE    Tablet 500milliGRAM(s) Oral every 12 hours    VITALS:    Vital Signs Last 24 Hrs  T(C): 35.9, Max: 36.7 (06-02 @ 21:12)  T(F): 96.6, Max: 98 (06-02 @ 21:12)  HR: 83 (77 - 100)  BP: 99/56 (92/52 - 158/68)  BP(mean): --  RR: 18 (16 - 18)  SpO2: 99% (97% - 99%)    I&O's Summary  I & Os for 24h ending 03 Jun 2017 07:00  =============================================  IN: 510 ml / OUT: 0 ml / NET: 510 ml    I & Os for current day (as of 03 Jun 2017 15:20)  =============================================  IN: 760 ml / OUT: 2200 ml / NET: -1440 ml    PHYSICAL EXAM:  Constitutional: NAD.  HEENT: anicteric sclera, +pallor  Neck: +JVD  Respiratory: Bibasilar crackles   Cardiovascular: S1, S2, RRR  Gastrointestinal: BS+, soft, NT. Distended  Extremities: trace peripheral edema in b/l LE   Neurological: A/O x 3, no focal deficits  Psychiatric: Normal mood, normal affect  : No CVA tenderness. No rosa.   Skin: No rashes  Vascular Access: RIJ Tunneled cath    LABS:    06-03    136  |  95<L>  |  24<H>  ----------------------------<  126<H>  4.0   |  24  |  7.27<H>    Ca    9.4      03 Jun 2017 11:00    TPro  6.9  /  Alb  2.3<L>  /  TBili  3.2<H>  /  DBili  x   /  AST  42<H>  /  ALT  16  /  AlkPhos  134<H>  06-03                          8.2    17.80 )-----------( 156      ( 03 Jun 2017 11:00 )             26.6       RADIOLOGY & ADDITIONAL STUDIES: Pt seen and examined at bedside    Subjective: been off Bipap since HD today AM. Dyspnea better. Denies chest pain.     Allergies:  No Known Allergies    Hospital Medications:   MEDICATIONS  (STANDING):  finasteride 5milliGRAM(s) Oral daily  amiodarone    Tablet 200milliGRAM(s) Oral daily  atorvastatin 20milliGRAM(s) Oral at bedtime  docusate sodium 100milliGRAM(s) Oral daily  midodrine 30milliGRAM(s) Oral every 8 hours  sevelamer hydrochloride 800milliGRAM(s) Oral three times a day with meals  levothyroxine 75MICROGram(s) Oral daily  insulin lispro (HumaLOG) corrective regimen sliding scale  SubCutaneous three times a day before meals  insulin lispro (HumaLOG) corrective regimen sliding scale  SubCutaneous at bedtime  dextrose 5%. 1000milliLiter(s) IV Continuous <Continuous>  dextrose 50% Injectable 12.5Gram(s) IV Push once  dextrose 50% Injectable 25Gram(s) IV Push once  dextrose 50% Injectable 25Gram(s) IV Push once  DOBUTamine Infusion 7MICROgram(s)/kG/Min IV Continuous <Continuous>  acetaminophen   Tablet. 650milliGRAM(s) Oral once  epoetin alba Injectable 4000Unit(s) IV Push <User Schedule>  cefepime  IVPB 1000milliGRAM(s) IV Intermittent every 24 hours  metroNIDAZOLE    Tablet 500milliGRAM(s) Oral every 12 hours    VITALS:    Vital Signs Last 24 Hrs  T(C): 35.9, Max: 36.7 (06-02 @ 21:12)  T(F): 96.6, Max: 98 (06-02 @ 21:12)  HR: 83 (77 - 100)  BP: 99/56 (92/52 - 158/68)  BP(mean): --  RR: 18 (16 - 18)  SpO2: 99% (97% - 99%)    I&O's Summary  I & Os for 24h ending 03 Jun 2017 07:00  =============================================  IN: 510 ml / OUT: 0 ml / NET: 510 ml    I & Os for current day (as of 03 Jun 2017 15:20)  =============================================  IN: 760 ml / OUT: 2200 ml / NET: -1440 ml    PHYSICAL EXAM:  Constitutional: NAD.  HEENT: anicteric sclera, +pallor  Neck: +JVD  Respiratory: Bibasilar crackles   Cardiovascular: S1, S2, RRR  Gastrointestinal: BS+, soft, NT. Distended  Extremities: trace peripheral edema in b/l LE   Neurological: A/O x 3, no focal deficits  Psychiatric: Normal mood, normal affect  : No CVA tenderness. No rosa.   Skin: No rashes  Vascular Access: RIJ Tunneled cath    LABS:    06-03    136  |  95<L>  |  24<H>  ----------------------------<  126<H>  4.0   |  24  |  7.27<H>    Ca    9.4      03 Jun 2017 11:00    TPro  6.9  /  Alb  2.3<L>  /  TBili  3.2<H>  /  DBili  x   /  AST  42<H>  /  ALT  16  /  AlkPhos  134<H>  06-03                          8.2    17.80 )-----------( 156      ( 03 Jun 2017 11:00 )             26.6       RADIOLOGY & ADDITIONAL STUDIES:

## 2017-06-03 NOTE — PROGRESS NOTE ADULT - ASSESSMENT
Patient is a 62y Male with severe heart failure, ESRD on HD a/w syncope and volume overload. on Dobutamine drip. Renal following for HD/PUF, for fluid Mx. w/pulm edema, resp failure on BiPAP, w/fever and new onset seizures. Patient is a 62y Male with severe heart failure, ESRD on HD a/w syncope and volume overload. on Dobutamine drip. Renal following for HD/PUF, for fluid Mx. w/pulm edema, resp failure on BiPAP, w/fever and new onset seizures. completed HD today, net uf 1600 ml removed. BP still borderline.

## 2017-06-03 NOTE — PROGRESS NOTE ADULT - ASSESSMENT
63 y/o man with non-ischemic cardiomyopathy and severely reduced EF with respiratory distress, now having jerking movements. On exam has flapping tremor. Unlikely that these movements are seizures as pt is completely awake and alert during the movements. With his worsening renal function would more likely be asterixis.

## 2017-06-04 LAB
APTT BLD: 72.5 SEC — HIGH (ref 27.5–37.4)
BUN SERPL-MCNC: 14 MG/DL — SIGNIFICANT CHANGE UP (ref 7–23)
CALCIUM SERPL-MCNC: 9.4 MG/DL — SIGNIFICANT CHANGE UP (ref 8.4–10.5)
CHLORIDE SERPL-SCNC: 98 MMOL/L — SIGNIFICANT CHANGE UP (ref 98–107)
CO2 SERPL-SCNC: 27 MMOL/L — SIGNIFICANT CHANGE UP (ref 22–31)
CREAT SERPL-MCNC: 4.82 MG/DL — HIGH (ref 0.5–1.3)
GLUCOSE SERPL-MCNC: 97 MG/DL — SIGNIFICANT CHANGE UP (ref 70–99)
HCT VFR BLD CALC: 26.3 % — LOW (ref 39–50)
HGB BLD-MCNC: 8.2 G/DL — LOW (ref 13–17)
INR BLD: 6.42 — CRITICAL HIGH (ref 0.88–1.17)
MCHC RBC-ENTMCNC: 28.5 PG — SIGNIFICANT CHANGE UP (ref 27–34)
MCHC RBC-ENTMCNC: 31.2 % — LOW (ref 32–36)
MCV RBC AUTO: 91.3 FL — SIGNIFICANT CHANGE UP (ref 80–100)
PLATELET # BLD AUTO: 168 K/UL — SIGNIFICANT CHANGE UP (ref 150–400)
PMV BLD: 11 FL — SIGNIFICANT CHANGE UP (ref 7–13)
POTASSIUM SERPL-MCNC: 3.9 MMOL/L — SIGNIFICANT CHANGE UP (ref 3.5–5.3)
POTASSIUM SERPL-SCNC: 3.9 MMOL/L — SIGNIFICANT CHANGE UP (ref 3.5–5.3)
PROTHROM AB SERPL-ACNC: 74.7 SEC — HIGH (ref 9.8–13.1)
RBC # BLD: 2.88 M/UL — LOW (ref 4.2–5.8)
RBC # FLD: 18.8 % — HIGH (ref 10.3–14.5)
SODIUM SERPL-SCNC: 138 MMOL/L — SIGNIFICANT CHANGE UP (ref 135–145)
SPECIMEN SOURCE: SIGNIFICANT CHANGE UP
WBC # BLD: 17.06 K/UL — HIGH (ref 3.8–10.5)
WBC # FLD AUTO: 17.06 K/UL — HIGH (ref 3.8–10.5)

## 2017-06-04 RX ORDER — PHYTONADIONE (VIT K1) 5 MG
5 TABLET ORAL ONCE
Qty: 0 | Refills: 0 | Status: COMPLETED | OUTPATIENT
Start: 2017-06-04 | End: 2017-06-04

## 2017-06-04 RX ADMIN — MIDODRINE HYDROCHLORIDE 30 MILLIGRAM(S): 2.5 TABLET ORAL at 13:35

## 2017-06-04 RX ADMIN — ATORVASTATIN CALCIUM 20 MILLIGRAM(S): 80 TABLET, FILM COATED ORAL at 21:24

## 2017-06-04 RX ADMIN — AMIODARONE HYDROCHLORIDE 200 MILLIGRAM(S): 400 TABLET ORAL at 05:46

## 2017-06-04 RX ADMIN — SEVELAMER CARBONATE 800 MILLIGRAM(S): 2400 POWDER, FOR SUSPENSION ORAL at 17:47

## 2017-06-04 RX ADMIN — SEVELAMER CARBONATE 800 MILLIGRAM(S): 2400 POWDER, FOR SUSPENSION ORAL at 08:45

## 2017-06-04 RX ADMIN — LEVETIRACETAM 400 MILLIGRAM(S): 250 TABLET, FILM COATED ORAL at 12:16

## 2017-06-04 RX ADMIN — FINASTERIDE 5 MILLIGRAM(S): 5 TABLET, FILM COATED ORAL at 12:16

## 2017-06-04 RX ADMIN — Medication 5 MILLIGRAM(S): at 15:27

## 2017-06-04 RX ADMIN — ERTAPENEM SODIUM 110 MILLIGRAM(S): 1 INJECTION, POWDER, LYOPHILIZED, FOR SOLUTION INTRAMUSCULAR; INTRAVENOUS at 15:27

## 2017-06-04 RX ADMIN — MIDODRINE HYDROCHLORIDE 30 MILLIGRAM(S): 2.5 TABLET ORAL at 21:24

## 2017-06-04 RX ADMIN — MIDODRINE HYDROCHLORIDE 30 MILLIGRAM(S): 2.5 TABLET ORAL at 05:46

## 2017-06-04 RX ADMIN — Medication 20.85 MICROGRAM(S)/KG/MIN: at 09:42

## 2017-06-04 RX ADMIN — SEVELAMER CARBONATE 800 MILLIGRAM(S): 2400 POWDER, FOR SUSPENSION ORAL at 12:17

## 2017-06-04 RX ADMIN — Medication 75 MICROGRAM(S): at 05:46

## 2017-06-04 NOTE — PROGRESS NOTE ADULT - SUBJECTIVE AND OBJECTIVE BOX
CCHIEF COMPLAINT:Patient is a 62y old  Male who presents with a chief complaint of SOB due to terminal heart failure, now with HCAP, worsening respiratory status      No Known Allergies      PAST MEDICAL & SURGICAL HISTORY:  Chronic hypotension  AF (atrial fibrillation)  COPD (chronic obstructive pulmonary disease): 4L home O2  HLD (hyperlipidemia)  DM (diabetes mellitus)  ESRD (end stage renal disease) on dialysis  BPH (benign prostatic hypertrophy)  Myocardial infarction: 10/2011  Chronic renal insufficiency  Gout  Dyslipidemia  Diabetes mellitus  CHF (congestive heart failure)  AICD (automatic cardioverter/defibrillator) present: Biot[employer] - placed 9/11/09  H/O coronary angiogram      FAMILY HISTORY:  No pertinent family history in first degree relatives      REVIEW OF SYSTEMS:  CONSTITUTIONAL: No fever, weight loss, or fatigue  EYES: No eye pain, visual disturbances, or discharge  NECK: No pain or stiffness  RESPIRATORY: Cough with green sputum improving, SOB stable, no wheezing  CARDIOVASCULAR: No chest pain, palpitations, passing out, dizziness, or leg swelling  GASTROINTESTINAL: No abdominal or epigastric pain. No nausea, vomiting, or hematemesis; No diarrhea or constipation. No melena or hematochezia.  GENITOURINARY: No dysuria, frequency, hematuria, or incontinence  NEUROLOGICAL: No headaches, memory loss, loss of strength, numbness, or tremors  SKIN: No itching, burning, rashes, or lesions   LYMPH Nodes: No enlarged glands  ENDOCRINE: No heat or cold intolerance; No hair loss  MUSCULOSKELETAL: No joint pain or swelling; No muscle, back, or extremity pain        Medications:  MEDICATIONS  (STANDING):  finasteride 5milliGRAM(s) Oral daily  amiodarone    Tablet 200milliGRAM(s) Oral daily  atorvastatin 20milliGRAM(s) Oral at bedtime  docusate sodium 100milliGRAM(s) Oral daily  midodrine 30milliGRAM(s) Oral every 8 hours  sevelamer hydrochloride 800milliGRAM(s) Oral three times a day with meals  levothyroxine 75MICROGram(s) Oral daily  insulin lispro (HumaLOG) corrective regimen sliding scale  SubCutaneous three times a day before meals  insulin lispro (HumaLOG) corrective regimen sliding scale  SubCutaneous at bedtime  dextrose 5%. 1000milliLiter(s) IV Continuous <Continuous>  dextrose 50% Injectable 12.5Gram(s) IV Push once  dextrose 50% Injectable 25Gram(s) IV Push once  dextrose 50% Injectable 25Gram(s) IV Push once  DOBUTamine Infusion 7MICROgram(s)/kG/Min IV Continuous <Continuous>  acetaminophen   Tablet. 650milliGRAM(s) Oral once  epoetin alba Injectable [id number](s) IV Push <User Schedule>  levETIRAcetam  IVPB 500milliGRAM(s) IV Intermittent daily  levETIRAcetam  IVPB 250milliGRAM(s) IV Intermittent daily  ertapenem  IVPB 500milliGRAM(s) IV Intermittent every 24 hours  phytonadione   Solution 5milliGRAM(s) Oral once    MEDICATIONS  (PRN):  dextrose Gel 1Dose(s) Oral once PRN Blood Glucose LESS THAN 70 milliGRAM(s)/deciliter  glucagon  Injectable 1milliGRAM(s) IntraMuscular once PRN Glucose LESS THAN 70 milligrams/deciliter  acetaminophen   Tablet 650milliGRAM(s) Oral every 6 hours PRN For Temp greater than 38 C (100.4 F)  acetaminophen    Suspension. 650milliGRAM(s) Oral every 6 hours PRN Moderate Pain (4 - 6)  artificial tears (preservative free) Ophthalmic Solution 1Drop(s) Both EYES three times a day PRN Dry Eyes  sodium chloride 0.65% Nasal 1Spray(s) Both Nostrils four times a day PRN Nasal Congestion    	    PHYSICAL EXAM:  T(C): 34.8, Max: 36.4 (06-03 @ 21:31)  HR: 80 (76 - 98)  BP: 95/62 (88/49 - 103/31)  RR: 18 (18 - 20)  SpO2: 100% (92% - 100%)  Wt(kg): --  I&O's Summary  I & Os for 24h ending 04 Jun 2017 07:00  =============================================  IN: 1578.4 ml / OUT: 2200 ml / NET: -621.6 ml    I & Os for current day (as of 04 Jun 2017 15:19)  =============================================  IN: 166.4 ml / OUT: 0 ml / NET: 166.4 ml      Appearance: Normal	  HEENT:   Normal oral mucosa, PERRL, EOMI	  Lymphatic: No lymphadenopathy  Cardiovascular: Normal S1 S2, No JVD, No murmurs, No edema  Respiratory: Lungs clear to auscultation	  Psychiatry: A & O x 3, Mood & affect appropriate  Gastrointestinal:  Soft, Non-tender, + BS	  Skin: No rashes, No ecchymoses, No cyanosis	  Neurologic: Non-focal  Extremities: Normal range of motion, No clubbing, cyanosis or edema  Vascular: Peripheral pulses palpable 2+ bilaterally    TELEMETRY: 	    ECG:  	  RADIOLOGY:  OTHER: 	  	  LABS:	 	    CARDIAC MARKERS:                                8.2    17.06 )-----------( 168      ( 04 Jun 2017 07:00 )             26.3     06-04    138  |  98  |  14  ----------------------------<  97  3.9   |  27  |  4.82<H>    Ca    9.4      04 Jun 2017 07:00    TPro  6.9  /  Alb  2.3<L>  /  TBili  3.2<H>  /  DBili  x   /  AST  42<H>  /  ALT  16  /  AlkPhos  134<H>  06-03    proBNP:   Lipid Profile:   HgA1c:   TSH:

## 2017-06-04 NOTE — PROGRESS NOTE ADULT - ASSESSMENT
61 yo M with acute on chronic severe systolic CHF (EF 19%), ESRD, DM2, A fib, HCAP  - HCAP - stable respiratory status and increasing WBC, will change to Ertapenem, follow up ID, check sputum culture, reattempt CT chest, BiPAP   - Acute on chronic systolic CHF - continue inotropic support as per Cardio, HD for maximum fluid removal  - ESRD - continue HD as per Renal, continue Midodrine. Renal to address possibility of outpatient HD, as pt refusing any palliative care options.  - A fib - continue Amio, hold Coumadin due to supratheraputic INR, which improved after Vit K Po yesterday, will give another dose of 5mg today.  - DM2 - controlled, continue with ISS  - Prognosis very poor, explained to patient and family, however, patient is refusing palliative input at this time and wants aggressive life support measures.

## 2017-06-05 LAB
BUN SERPL-MCNC: 23 MG/DL — SIGNIFICANT CHANGE UP (ref 7–23)
CALCIUM SERPL-MCNC: 9.6 MG/DL — SIGNIFICANT CHANGE UP (ref 8.4–10.5)
CHLORIDE SERPL-SCNC: 98 MMOL/L — SIGNIFICANT CHANGE UP (ref 98–107)
CO2 SERPL-SCNC: 25 MMOL/L — SIGNIFICANT CHANGE UP (ref 22–31)
CREAT SERPL-MCNC: 6.38 MG/DL — HIGH (ref 0.5–1.3)
GLUCOSE SERPL-MCNC: 124 MG/DL — HIGH (ref 70–99)
HCT VFR BLD CALC: 25.8 % — LOW (ref 39–50)
HGB BLD-MCNC: 7.8 G/DL — LOW (ref 13–17)
INR BLD: 3.04 — HIGH (ref 0.88–1.17)
MAGNESIUM SERPL-MCNC: 2.1 MG/DL — SIGNIFICANT CHANGE UP (ref 1.6–2.6)
MCHC RBC-ENTMCNC: 27.8 PG — SIGNIFICANT CHANGE UP (ref 27–34)
MCHC RBC-ENTMCNC: 30.2 % — LOW (ref 32–36)
MCV RBC AUTO: 91.8 FL — SIGNIFICANT CHANGE UP (ref 80–100)
PLATELET # BLD AUTO: 168 K/UL — SIGNIFICANT CHANGE UP (ref 150–400)
PMV BLD: 11.3 FL — SIGNIFICANT CHANGE UP (ref 7–13)
POTASSIUM SERPL-MCNC: 4.3 MMOL/L — SIGNIFICANT CHANGE UP (ref 3.5–5.3)
POTASSIUM SERPL-SCNC: 4.3 MMOL/L — SIGNIFICANT CHANGE UP (ref 3.5–5.3)
PROTHROM AB SERPL-ACNC: 34.8 SEC — HIGH (ref 9.8–13.1)
RBC # BLD: 2.81 M/UL — LOW (ref 4.2–5.8)
RBC # FLD: 19.3 % — HIGH (ref 10.3–14.5)
SODIUM SERPL-SCNC: 137 MMOL/L — SIGNIFICANT CHANGE UP (ref 135–145)
WBC # BLD: 16.65 K/UL — HIGH (ref 3.8–10.5)
WBC # FLD AUTO: 16.65 K/UL — HIGH (ref 3.8–10.5)

## 2017-06-05 PROCEDURE — 99232 SBSQ HOSP IP/OBS MODERATE 35: CPT

## 2017-06-05 PROCEDURE — 71250 CT THORAX DX C-: CPT | Mod: 26

## 2017-06-05 RX ADMIN — Medication 20.85 MICROGRAM(S)/KG/MIN: at 22:00

## 2017-06-05 RX ADMIN — FINASTERIDE 5 MILLIGRAM(S): 5 TABLET, FILM COATED ORAL at 12:33

## 2017-06-05 RX ADMIN — LEVETIRACETAM 400 MILLIGRAM(S): 250 TABLET, FILM COATED ORAL at 12:49

## 2017-06-05 RX ADMIN — SEVELAMER CARBONATE 800 MILLIGRAM(S): 2400 POWDER, FOR SUSPENSION ORAL at 12:33

## 2017-06-05 RX ADMIN — ATORVASTATIN CALCIUM 20 MILLIGRAM(S): 80 TABLET, FILM COATED ORAL at 21:59

## 2017-06-05 RX ADMIN — ERTAPENEM SODIUM 110 MILLIGRAM(S): 1 INJECTION, POWDER, LYOPHILIZED, FOR SOLUTION INTRAMUSCULAR; INTRAVENOUS at 14:31

## 2017-06-05 RX ADMIN — MIDODRINE HYDROCHLORIDE 30 MILLIGRAM(S): 2.5 TABLET ORAL at 06:09

## 2017-06-05 RX ADMIN — Medication 100 MILLIGRAM(S): at 12:33

## 2017-06-05 RX ADMIN — Medication 75 MICROGRAM(S): at 06:09

## 2017-06-05 RX ADMIN — MIDODRINE HYDROCHLORIDE 30 MILLIGRAM(S): 2.5 TABLET ORAL at 14:31

## 2017-06-05 RX ADMIN — Medication 20.85 MICROGRAM(S)/KG/MIN: at 11:08

## 2017-06-05 RX ADMIN — SEVELAMER CARBONATE 800 MILLIGRAM(S): 2400 POWDER, FOR SUSPENSION ORAL at 17:14

## 2017-06-05 RX ADMIN — AMIODARONE HYDROCHLORIDE 200 MILLIGRAM(S): 400 TABLET ORAL at 06:09

## 2017-06-05 RX ADMIN — MIDODRINE HYDROCHLORIDE 30 MILLIGRAM(S): 2.5 TABLET ORAL at 22:00

## 2017-06-05 NOTE — PROGRESS NOTE ADULT - ASSESSMENT
Patient is a 62y Male with severe heart failure, ESRD on HD a/w syncope and volume overload. on Dobutamine drip. Renal following for HD/PUF, for fluid Mx. w/pulm edema, resp failure on BiPAP, s/p fever, seizure. BP still borderline.

## 2017-06-05 NOTE — PROGRESS NOTE ADULT - SUBJECTIVE AND OBJECTIVE BOX
Pt seen and examined at bedside    Subjective/Objecvtive: s/p RRT in AM for chocking on dentures. back on Bipap. pt denies any sxs. Denies chest pain. sob better    Allergies:  No Known Allergies    Hospital Medications:   MEDICATIONS  (STANDING):  finasteride 5milliGRAM(s) Oral daily  amiodarone    Tablet 200milliGRAM(s) Oral daily  atorvastatin 20milliGRAM(s) Oral at bedtime  docusate sodium 100milliGRAM(s) Oral daily  midodrine 30milliGRAM(s) Oral every 8 hours  sevelamer hydrochloride 800milliGRAM(s) Oral three times a day with meals  levothyroxine 75MICROGram(s) Oral daily  insulin lispro (HumaLOG) corrective regimen sliding scale  SubCutaneous three times a day before meals  insulin lispro (HumaLOG) corrective regimen sliding scale  SubCutaneous at bedtime  dextrose 5%. 1000milliLiter(s) IV Continuous <Continuous>  dextrose 50% Injectable 12.5Gram(s) IV Push once  dextrose 50% Injectable 25Gram(s) IV Push once  dextrose 50% Injectable 25Gram(s) IV Push once  DOBUTamine Infusion 7MICROgram(s)/kG/Min IV Continuous <Continuous>  acetaminophen   Tablet. 650milliGRAM(s) Oral once  epoetin alba Injectable 4000Unit(s) IV Push <User Schedule>  cefepime  IVPB 1000milliGRAM(s) IV Intermittent every 24 hours  metroNIDAZOLE    Tablet 500milliGRAM(s) Oral every 12 hours    VITALS:    Vital Signs Last 24 Hrs  T(C): 36.4, Max: 36.6 (06-04 @ 20:40)  T(F): 97.5, Max: 97.8 (06-04 @ 20:40)  HR: 78 (78 - 105)  BP: 94/64 (81/47 - 103/56)  BP(mean): --  RR: 18 (18 - 19)  SpO2: 98% (96% - 100%)    I&O's Summary  I & Os for 24h ending 05 Jun 2017 07:00  =============================================  IN: 1249.2 ml / OUT: 0 ml / NET: 1249.2 ml    I & Os for current day (as of 05 Jun 2017 13:55)  =============================================  IN: 145.6 ml / OUT: 0 ml / NET: 145.6 ml    PHYSICAL EXAM:  Constitutional: NAD.  HEENT: anicteric sclera, +pallor  Neck: +JVD  Respiratory: Bibasilar crackles   Cardiovascular: S1, S2, RRR  Gastrointestinal: BS+, soft, NT. Distended  Extremities: trace peripheral edema in b/l LE   Neurological: A/O x 3, no focal deficits  Psychiatric: Normal mood, normal affect  : No CVA tenderness. No rosa.   Skin: No rashes  Vascular Access: RIJ Tunneled cath    LABS:    06-04    138  |  98  |  14  ----------------------------<  97  3.9   |  27  |  4.82<H>    Ca    9.4      04 Jun 2017 07:00                          8.2    17.06 )-----------( 168      ( 04 Jun 2017 07:00 )             26.3       RADIOLOGY & ADDITIONAL STUDIES:

## 2017-06-05 NOTE — CHART NOTE - NSCHARTNOTEFT_GEN_A_CORE
RRT called by RN and RT for respiratory distress after swallowing his dentures this morning. Pt was on BIPAP overnight, which was removed for breakfast. Dentures were placed, pt accidently choked on them (did not swallow any food as per family) but RN and RT weer able to recover them. Pt was hypoxemic in 80s% on VentiMask and slightly tachypneic following event but otherwise HD stable. BIPAP (10/5, 60%) was placed back on pt (without dentures) with improvement in respiratory status and O2 sat 100%.

## 2017-06-05 NOTE — PROGRESS NOTE ADULT - PROBLEM SELECTOR PLAN 1
PUF today, net uf 2kg as tolerated by bp. next hd tomorrow 2/2k bath, uf 2kg as tolerated. c/w midodrine  Despite near daily dialysis/uf treatment to help optimize fluid status, pt continues to have pulm edema and dyspnea. Dialysis not adequate to maintain euvolemia, in setting of severe, dobutamine dependent CHF. Very poor prognosis.   Moreover, it will not be logistically possible to have permanent 4 times weekly HD treatments in outpt HD unit.  f/u w/pall care.   c/w renal diet, fluid restriction.

## 2017-06-05 NOTE — PROGRESS NOTE ADULT - SUBJECTIVE AND OBJECTIVE BOX
CHIEF COMPLAINT: Patient is a 62y old  Male who presents with a chief complaint of SOB due to terminal heart failure, now with HCAP, worsening respiratory status    	  Interval history: Pt continues on BiPAP. RRT event noted      Allergies:  No Known Allergies      PAST MEDICAL & SURGICAL HISTORY:  Chronic hypotension  AF (atrial fibrillation)  COPD (chronic obstructive pulmonary disease): 4L home O2  HLD (hyperlipidemia)  DM (diabetes mellitus)  ESRD (end stage renal disease) on dialysis  BPH (benign prostatic hypertrophy)  Myocardial infarction: 10/2011  Chronic renal insufficiency  Gout  Dyslipidemia  Diabetes mellitus  CHF (congestive heart failure)  AICD (automatic cardioverter/defibrillator) present: Biotronic - placed 9/11/09  H/O coronary angiogram      FAMILY HISTORY:  No pertinent family history in first degree relatives      REVIEW OF SYSTEMS:  CONSTITUTIONAL: No fever, weight loss, or fatigue  EYES: No eye pain, visual disturbances, or discharge  NECK: No pain or stiffness  RESPIRATORY: No cough or wheezing, Shortness of breath stable  CARDIOVASCULAR: No chest pain, palpitations, dizziness, or leg swelling  GASTROINTESTINAL: No abdominal or epigastric pain. No nausea, vomiting, diarrhea or constipation  GENITOURINARY: No dysuria, urinary frequency or urgency, no hematuria  NEUROLOGICAL: No headaches, memory loss, loss of strength, numbness, or tremors  SKIN: No itching, burning, rashes, or lesions   MUSCULOSKELETAL: No joint pain or swelling; No muscle, back, or extremity pain    Medications:  MEDICATIONS  (STANDING):  finasteride 5milliGRAM(s) Oral daily  amiodarone    Tablet 200milliGRAM(s) Oral daily  atorvastatin 20milliGRAM(s) Oral at bedtime  docusate sodium 100milliGRAM(s) Oral daily  midodrine 30milliGRAM(s) Oral every 8 hours  sevelamer hydrochloride 800milliGRAM(s) Oral three times a day with meals  levothyroxine 75MICROGram(s) Oral daily  insulin lispro (HumaLOG) corrective regimen sliding scale  SubCutaneous three times a day before meals  insulin lispro (HumaLOG) corrective regimen sliding scale  SubCutaneous at bedtime  dextrose 5%. 1000milliLiter(s) IV Continuous <Continuous>  dextrose 50% Injectable 12.5Gram(s) IV Push once  dextrose 50% Injectable 25Gram(s) IV Push once  dextrose 50% Injectable 25Gram(s) IV Push once  DOBUTamine Infusion 7MICROgram(s)/kG/Min IV Continuous <Continuous>  acetaminophen   Tablet. 650milliGRAM(s) Oral once  epoetin alba Injectable 59236Jjls(s) IV Push <User Schedule>  levETIRAcetam  IVPB 500milliGRAM(s) IV Intermittent daily  levETIRAcetam  IVPB 250milliGRAM(s) IV Intermittent daily  ertapenem  IVPB 500milliGRAM(s) IV Intermittent every 24 hours    MEDICATIONS  (PRN):  dextrose Gel 1Dose(s) Oral once PRN Blood Glucose LESS THAN 70 milliGRAM(s)/deciliter  glucagon  Injectable 1milliGRAM(s) IntraMuscular once PRN Glucose LESS THAN 70 milligrams/deciliter  acetaminophen   Tablet 650milliGRAM(s) Oral every 6 hours PRN For Temp greater than 38 C (100.4 F)  acetaminophen    Suspension. 650milliGRAM(s) Oral every 6 hours PRN Moderate Pain (4 - 6)  artificial tears (preservative free) Ophthalmic Solution 1Drop(s) Both EYES three times a day PRN Dry Eyes  sodium chloride 0.65% Nasal 1Spray(s) Both Nostrils four times a day PRN Nasal Congestion    	    PHYSICAL EXAM:  T(C): 36.4, Max: 36.6 (06-04 @ 20:40)  HR: 78 (78 - 105)  BP: 94/64 (81/47 - 103/56)  RR: 18 (18 - 19)  SpO2: 98% (96% - 100%)  Wt(kg): --  I&O's Summary  I & Os for 24h ending 05 Jun 2017 07:00  =============================================  IN: 1249.2 ml / OUT: 0 ml / NET: 1249.2 ml    I & Os for current day (as of 05 Jun 2017 14:37)  =============================================  IN: 166.4 ml / OUT: 0 ml / NET: 166.4 ml      Appearance: Normal	  HEENT:   NCAT, PERRL, EOMI	  Lymphatic: No lymphadenopathy  Cardiovascular: Normal S1 S2, RRR  Respiratory: Lungs clear to auscultation BL  Psychiatry: A & O x 3, Mood & affect appropriate  Gastrointestinal:  Soft, Non-tender, + BS  Skin: No rashes, No ecchymoses, No cyanosis	  Neurologic: Non-focal  Extremities: Normal range of motion, No clubbing, cyanosis or edema    	  LABS:	 	                            8.2    17.06 )-----------( 168      ( 04 Jun 2017 07:00 )             26.3     06-04    138  |  98  |  14  ----------------------------<  97  3.9   |  27  |  4.82<H>    Ca    9.4      04 Jun 2017 07:00      proBNP:   Lipid Profile:   HgA1c:   TSH:

## 2017-06-05 NOTE — PROGRESS NOTE ADULT - SUBJECTIVE AND OBJECTIVE BOX
CC: F/U for aspiration    Saw/spoke to patient. Mental status improving. No fevers. Resp. status overall improving. No CP/SOB, no N/V/D. Had episode of choking on dentures this AM. Continues to have risk for aspiration.    Allergies  No Known Allergies    ANTIMICROBIALS:  ertapenem  IVPB 500 every 24 hours    PE:    Vital Signs Last 24 Hrs  T(C): 36.4, Max: 36.6 (06-04 @ 20:40)  T(F): 97.5, Max: 97.8 (06-04 @ 20:40)  HR: 78 (78 - 105)  BP: 94/64 (81/47 - 103/56)  BP(mean): --  RR: 18 (18 - 19)  SpO2: 98% (96% - 100%)    Gen: AOx1-2, improved non-toxic  CV: S1+S2 normal, no murmurs, nontachycardic  Resp: Decreasing crackles compared to prior exam, on face mask O2  Abd: Soft, nontender, +BS  Ext: No LE edema, no wounds; R. IJ Permacath    LABS:                        7.8    16.65 )-----------( 168      ( 05 Jun 2017 14:20 )             25.8    06-05    137  |  98  |  23  ----------------------------<  124<H>  4.3   |  25  |  6.38<H>    Ca    9.6      05 Jun 2017 14:20  Mg     2.1     06-05    MICROBIOLOGY:    BLOOD  06-03 @ 12:50 --    NO ORGANISMS ISOLATED  NO ORGANISMS ISOLATED AT 48 HRS.    BLOOD  06-02 @ 16:29 --    NO ORGANISMS ISOLATED  NO ORGANISMS ISOLATED AT 48 HRS.    BLOOD PERIPHERAL  05-29 @ 04:10 --    NO ORGANISMS ISOLATED     BLOOD  05-20 @ 20:53 --    NO ORGANISMS ISOLATED    RADIOLOGY:    CXR 6/2:  Impression:  Portable chest dated 6/2 at 1643 hours shows a right IJ catheter in the   region of right atrium, unchanged in position.  Left-sided single-lead defibrillator  Persistent bilateral pulmonary edema and small pleural effusions.   Heart and mediastinum cannot be evaluated on this projection.

## 2017-06-05 NOTE — PROGRESS NOTE ADULT - ASSESSMENT
63 yo M with acute on chronic severe systolic CHF (EF 19%), ESRD, DM2, A fib, HCAP  - HCAP - stable on BiPAP, continue Ertapenem, follow up ID, reattempt CT chest, BiPAP   - Acute on chronic systolic CHF - continue inotropic support as per Cardio, HD for maximum fluid removal  - ESRD - continue HD as per Renal, continue Midodrine. Renal to address possibility of outpatient HD, as pt refusing any palliative care options.  - A fib - continue Amio, follow INR, hold Coumadin  - DM2 - controlled, continue with ISS  - Prognosis very poor, explained to patient and family, however, patient is refusing palliative input at this time and wants aggressive life support measures.

## 2017-06-05 NOTE — PROGRESS NOTE ADULT - ASSESSMENT
63 y/o male with a PMHx of NICM with severe LV dysfunction (EF 19%) S/P Biotronic ICD placement on Dobutamine gtt via chronic left upper extremity PICC line, ESRD on HD M/W/F, atrial fibrillation on Coumadin, COPD on 4L home O2, HLD, DM, chronic hypotension on Midodrine, presents to ED S/P syncopal episode. Concern for new weakness, SOB, fever. With onset of green sputum, consider possibility of new onset HCAP. Multifactorial respiratory distress on bipap. Would be concerned for fluid overload in this patient with EF 19%, avoid zosyn if possible. Worsening resp distress 2/2 to fluid overload vs new aspiration? Team changed abx to Ertapenem over the weekend.  - Ertapenem 500 q 24  - CHF per primary team  - Agree with plan for CT Chest  - Low threshold for MICU eval  - Uncertain that WBC is representative of persistent infection vs reactive to overall respiratory status; please be aware that Ertapenem is narrower than Cefepime/Flagyl      Carlos Manuel Medellin MD  Pager [phone]  After 5pm and on weekends call [phone]

## 2017-06-06 DIAGNOSIS — J96.00 ACUTE RESPIRATORY FAILURE, UNSPECIFIED WHETHER WITH HYPOXIA OR HYPERCAPNIA: ICD-10-CM

## 2017-06-06 DIAGNOSIS — I42.9 CARDIOMYOPATHY, UNSPECIFIED: ICD-10-CM

## 2017-06-06 DIAGNOSIS — J84.10 PULMONARY FIBROSIS, UNSPECIFIED: ICD-10-CM

## 2017-06-06 DIAGNOSIS — J15.9 UNSPECIFIED BACTERIAL PNEUMONIA: ICD-10-CM

## 2017-06-06 LAB
BUN SERPL-MCNC: 23 MG/DL — SIGNIFICANT CHANGE UP (ref 7–23)
CALCIUM SERPL-MCNC: 8.9 MG/DL — SIGNIFICANT CHANGE UP (ref 8.4–10.5)
CHLORIDE SERPL-SCNC: 96 MMOL/L — LOW (ref 98–107)
CO2 SERPL-SCNC: 26 MMOL/L — SIGNIFICANT CHANGE UP (ref 22–31)
CREAT SERPL-MCNC: 5.85 MG/DL — HIGH (ref 0.5–1.3)
GLUCOSE SERPL-MCNC: 113 MG/DL — HIGH (ref 70–99)
HCT VFR BLD CALC: 24.8 % — LOW (ref 39–50)
HGB BLD-MCNC: 7.8 G/DL — LOW (ref 13–17)
INR BLD: 2.3 — HIGH (ref 0.88–1.17)
MAGNESIUM SERPL-MCNC: 2.1 MG/DL — SIGNIFICANT CHANGE UP (ref 1.6–2.6)
MCHC RBC-ENTMCNC: 28.8 PG — SIGNIFICANT CHANGE UP (ref 27–34)
MCHC RBC-ENTMCNC: 31.5 % — LOW (ref 32–36)
MCV RBC AUTO: 91.5 FL — SIGNIFICANT CHANGE UP (ref 80–100)
PLATELET # BLD AUTO: 168 K/UL — SIGNIFICANT CHANGE UP (ref 150–400)
PMV BLD: 10.9 FL — SIGNIFICANT CHANGE UP (ref 7–13)
POTASSIUM SERPL-MCNC: 3.9 MMOL/L — SIGNIFICANT CHANGE UP (ref 3.5–5.3)
POTASSIUM SERPL-SCNC: 3.9 MMOL/L — SIGNIFICANT CHANGE UP (ref 3.5–5.3)
PROTHROM AB SERPL-ACNC: 26.2 SEC — HIGH (ref 9.8–13.1)
RBC # BLD: 2.71 M/UL — LOW (ref 4.2–5.8)
RBC # FLD: 19.6 % — HIGH (ref 10.3–14.5)
SODIUM SERPL-SCNC: 134 MMOL/L — LOW (ref 135–145)
WBC # BLD: 14.78 K/UL — HIGH (ref 3.8–10.5)
WBC # FLD AUTO: 14.78 K/UL — HIGH (ref 3.8–10.5)

## 2017-06-06 PROCEDURE — 95819 EEG AWAKE AND ASLEEP: CPT | Mod: 26

## 2017-06-06 PROCEDURE — 95957 EEG DIGITAL ANALYSIS: CPT | Mod: 26

## 2017-06-06 PROCEDURE — 99232 SBSQ HOSP IP/OBS MODERATE 35: CPT

## 2017-06-06 RX ORDER — BUDESONIDE AND FORMOTEROL FUMARATE DIHYDRATE 160; 4.5 UG/1; UG/1
2 AEROSOL RESPIRATORY (INHALATION)
Qty: 0 | Refills: 0 | Status: DISCONTINUED | OUTPATIENT
Start: 2017-06-06 | End: 2017-06-23

## 2017-06-06 RX ORDER — ERYTHROPOIETIN 10000 [IU]/ML
20000 INJECTION, SOLUTION INTRAVENOUS; SUBCUTANEOUS
Qty: 0 | Refills: 0 | Status: DISCONTINUED | OUTPATIENT
Start: 2017-06-06 | End: 2017-06-23

## 2017-06-06 RX ORDER — WARFARIN SODIUM 2.5 MG/1
0.5 TABLET ORAL ONCE
Qty: 0 | Refills: 0 | Status: COMPLETED | OUTPATIENT
Start: 2017-06-06 | End: 2017-06-06

## 2017-06-06 RX ADMIN — Medication 20.85 MICROGRAM(S)/KG/MIN: at 13:15

## 2017-06-06 RX ADMIN — LEVETIRACETAM 400 MILLIGRAM(S): 250 TABLET, FILM COATED ORAL at 13:08

## 2017-06-06 RX ADMIN — Medication 20.85 MICROGRAM(S)/KG/MIN: at 21:46

## 2017-06-06 RX ADMIN — BUDESONIDE AND FORMOTEROL FUMARATE DIHYDRATE 2 PUFF(S): 160; 4.5 AEROSOL RESPIRATORY (INHALATION) at 23:37

## 2017-06-06 RX ADMIN — ATORVASTATIN CALCIUM 20 MILLIGRAM(S): 80 TABLET, FILM COATED ORAL at 21:46

## 2017-06-06 RX ADMIN — SEVELAMER CARBONATE 800 MILLIGRAM(S): 2400 POWDER, FOR SUSPENSION ORAL at 18:04

## 2017-06-06 RX ADMIN — ERTAPENEM SODIUM 110 MILLIGRAM(S): 1 INJECTION, POWDER, LYOPHILIZED, FOR SOLUTION INTRAMUSCULAR; INTRAVENOUS at 18:03

## 2017-06-06 RX ADMIN — Medication 20.85 MICROGRAM(S)/KG/MIN: at 06:13

## 2017-06-06 RX ADMIN — FINASTERIDE 5 MILLIGRAM(S): 5 TABLET, FILM COATED ORAL at 13:09

## 2017-06-06 RX ADMIN — AMIODARONE HYDROCHLORIDE 200 MILLIGRAM(S): 400 TABLET ORAL at 06:14

## 2017-06-06 RX ADMIN — Medication 75 MICROGRAM(S): at 06:14

## 2017-06-06 RX ADMIN — ERYTHROPOIETIN 10000 UNIT(S): 10000 INJECTION, SOLUTION INTRAVENOUS; SUBCUTANEOUS at 10:44

## 2017-06-06 RX ADMIN — WARFARIN SODIUM 0.5 MILLIGRAM(S): 2.5 TABLET ORAL at 18:03

## 2017-06-06 RX ADMIN — MIDODRINE HYDROCHLORIDE 30 MILLIGRAM(S): 2.5 TABLET ORAL at 21:47

## 2017-06-06 RX ADMIN — LEVETIRACETAM 400 MILLIGRAM(S): 250 TABLET, FILM COATED ORAL at 16:03

## 2017-06-06 RX ADMIN — SEVELAMER CARBONATE 800 MILLIGRAM(S): 2400 POWDER, FOR SUSPENSION ORAL at 08:51

## 2017-06-06 RX ADMIN — MIDODRINE HYDROCHLORIDE 30 MILLIGRAM(S): 2.5 TABLET ORAL at 06:14

## 2017-06-06 RX ADMIN — SEVELAMER CARBONATE 800 MILLIGRAM(S): 2400 POWDER, FOR SUSPENSION ORAL at 13:09

## 2017-06-06 RX ADMIN — MIDODRINE HYDROCHLORIDE 30 MILLIGRAM(S): 2.5 TABLET ORAL at 16:03

## 2017-06-06 NOTE — PROGRESS NOTE ADULT - ASSESSMENT
61 yo M with acute on chronic severe systolic CHF (EF 19%), ESRD, DM2, A fib, HCAP  - HCAP - stable on BiPAP, continue Ertapenem, follow up ID, , BiPAP   ct chest noted / pulm eval noted  - Acute on chronic systolic CHF - continue inotropic support as per Cardio, HD for maximum fluid removal  - ESRD - continue HD as per Renal, continue Midodrine. Renal to address possibility of outpatient HD, as pt refusing any palliative care options.  - A fib - continue Amio, follow INR,   - DM2 - controlled, continue with ISS  - Prognosis very poor, explained to patient and family, however, patient is refusing palliative input at this time and wants aggressive life support measures.

## 2017-06-06 NOTE — CONSULT NOTE ADULT - ATTENDING COMMENTS
Pts  overll prognosis is poor given h I s renal failure, respiratory failure and end stage heart disease: with him being on dobutamine

## 2017-06-06 NOTE — PROGRESS NOTE ADULT - SUBJECTIVE AND OBJECTIVE BOX
CC: F/U for Leukocytosis    Saw/spoke to patient. No fevers, still short of breath. No N/V/D. No chest pain. No new events.    Allergies  No Known Allergies    ANTIMICROBIALS:  ertapenem  IVPB 500 every 24 hours    PE:    Vital Signs Last 24 Hrs  T(C): 36.6, Max: 36.7 (06-06 @ 06:04)  T(F): 97.8, Max: 98 (06-06 @ 06:04)  HR: 89 (77 - 102)  BP: 88/42 (88/42 - 108/45)  BP(mean): --  RR: 12 (12 - 22)  SpO2: 100% (97% - 100%)    Gen: AOx1-2, NAD, ill appearing  CV: S1+S2 normal, no murmurs, nontachycardic  Resp: Clear bilat, no resp distress, no crackles/wheezes  Abd: Soft, nontender, +BS  Ext: No LE edema, no wounds    LABS:                        7.8    14.78 )-----------( 168      ( 06 Jun 2017 07:29 )             24.8     06-06    134<L>  |  96<L>  |  23  ----------------------------<  113<H>  3.9   |  26  |  5.85<H>    Ca    8.9      06 Jun 2017 07:29  Mg     2.1     06-06      MICROBIOLOGY:    BLOOD  06-03 @ 12:50 --    NO ORGANISMS ISOLATED  NO ORGANISMS ISOLATED AT 48 HRS.    BLOOD  06-02 @ 16:29 --    NO ORGANISMS ISOLATED  NO ORGANISMS ISOLATED AT 48 HRS.    BLOOD PERIPHERAL  05-29 @ 04:10 --    NO ORGANISMS ISOLATED    RADIOLOGY:    CT Chest:    IMPRESSION:    Extensive pulmonary fibrosis.    Evidence of superimposed mild small airway disease.

## 2017-06-06 NOTE — CONSULT NOTE ADULT - PROBLEM SELECTOR RECOMMENDATION 6
A fib - continue Amio, follow INR, hold Coumadin
Spoke with pt regarding his medical conditions and prognosis. He understands that he has worsening heart failure despite being on inotropic support and is requiring more frequent HD. He confirmed that he wants to be DNR/DNI. Spoke with him regarding possible hospice and pt not agreeable for hospice at this time. Attempted to call wife (at [phone]) to set up a family meeting, however, unable to reach.

## 2017-06-06 NOTE — CONSULT NOTE ADULT - PROBLEM SELECTOR RECOMMENDATION 7
ESRD - continue HD as per Renal, continue Midodrine. Renal to address possibility of outpatient HD, as pt refusing any palliative care options.

## 2017-06-06 NOTE — CONSULT NOTE ADULT - PROBLEM SELECTOR RECOMMENDATION 3
Per sister patient had lung biopsy done last year following which he ended up on oxygen. Per her he was not on prednisone or any other treatment for pulmonary fibrosis. The ct scan chest done yesterday reveals extensive fibrosis and bronchiectasis: need to obtain his old records from his pulmonologist.
Pt is on 4L of home O2  - appears comfortable on exam

## 2017-06-06 NOTE — CONSULT NOTE ADULT - SUBJECTIVE AND OBJECTIVE BOX
In addition to the following history: per his sister , she tells me that patient had lung biopsy done last year at [facility]? and ended up on oxygen at home: Patient has been in the hospital for past 4 weeks and yesterdays events noted with patient " choking on dentures" : Being treated fro HCAP       Patient is a 62y old  Male who presents with a chief complaint of sob (09 May 2017 15:32)      HPI:  61 y/o male with a PMHx of NICM with severe LV dysfunction (EF 19%) S/P Biotronic ICD placement on Dobutamine gtt via chronic left upper extremity PICC line, ESRD on HD M/W/F, atrial fibrillation on Coumadin, COPD on 4L home O2, HLD, DM, chronic hypotension on Midodrine, presents to ED S/P syncopal episode. Pt lives with his wife who is his primary caregiver and has a visiting nurse that comes weekly to change the dressing of his PICC line; he ambulates with a walker. Pt was getting ready for his dialysis session this morning before 5:00AM. On his way to the car outside, he started to shake and his legs started to give out. Wife noticed this and was able to brace him and lower to the ground to avoid any head or bodily trauma. According to wife, pt lost consciousness for approximately 5 minutes. In that time, wife called patient's son from inside the house who came outside to help his father up. EMS was then called and brought patient to the ED. Pt only admits to chronic dry cough and chronic dyspnea on exertion. Pt admits to being compliant with his diet, fluid restrictions, and medication administration. Pt denies fever, chills, recent travel, headache, dizziness, visual deficits, chest pain, PND, palpitations, abdominal pain, N/V/D/C, hematochezia, melena, dysuria, hematuria, fecal incontinence, seizures, convulsions. Upon arrival to ED, EKG: Sinus tachycardia at 105 bpm, LAD. CE x1: Trop 0.28, CK negative. CXR: Perihilar reticular and groundglass opacities consistent with pulmonary edema. Moderate pulmonary edema with likely small right pleural effusion. H/H: 10.6/35.8. Platelets: 133. BUN/Cr: 29/5.71. Alk phos: 166. ProBNP: 93902. Pt made DNR by MAR before admitted to [facility]. (09 May 2017 10:29)      ?FOLLOWING PRESENT  [x ] Hx of PE/DVT, [y ] Hx COPD, [n ] Hx of Asthma, [y ] Hx of Hospitalization, [ y]  Hx of BiPAP/CPAP use, [n ] Hx of ALONZO    Allergies    No Known Allergies    Intolerances        PAST MEDICAL & SURGICAL HISTORY:  Chronic hypotension  AF (atrial fibrillation)  COPD (chronic obstructive pulmonary disease): 4L home O2  HLD (hyperlipidemia)  DM (diabetes mellitus)  ESRD (end stage renal disease) on dialysis  BPH (benign prostatic hypertrophy)  Myocardial infarction: 10/2011  Chronic renal insufficiency  Gout  Dyslipidemia  Diabetes mellitus  CHF (congestive heart failure)  AICD (automatic cardioverter/defibrillator) present: Biotronic - placed 9/11/09  H/O coronary angiogram      FAMILY HISTORY:  No pertinent family history in first degree relatives      Social History: [ n ] TOBACCO                  [ n ] ETOH                                 [ n ] IVDA/DRUGS    REVIEW OF SYSTEMS      General:	weak    Skin/Breast:x  	  Ophthalmologic:x  	  ENMT:	x    Respiratory and Thorax: crackles bilaterally   	  Cardiovascular:	x    Gastrointestinal:	x    Genitourinary:	x    Musculoskeletal:	x    Neurological:	x  x  Psychiatric:	x    Hematology/Lymphatics:	x    Endocrine:	    Allergic/Immunologic:	    MEDICATIONS  (STANDING):  finasteride 5milliGRAM(s) Oral daily  amiodarone    Tablet 200milliGRAM(s) Oral daily  atorvastatin 20milliGRAM(s) Oral at bedtime  docusate sodium 100milliGRAM(s) Oral daily  midodrine 30milliGRAM(s) Oral every 8 hours  sevelamer hydrochloride 800milliGRAM(s) Oral three times a day with meals  levothyroxine 75MICROGram(s) Oral daily  insulin lispro (HumaLOG) corrective regimen sliding scale  SubCutaneous three times a day before meals  insulin lispro (HumaLOG) corrective regimen sliding scale  SubCutaneous at bedtime  dextrose 5%. 1000milliLiter(s) IV Continuous <Continuous>  dextrose 50% Injectable 12.5Gram(s) IV Push once  dextrose 50% Injectable 25Gram(s) IV Push once  dextrose 50% Injectable 25Gram(s) IV Push once  DOBUTamine Infusion 7MICROgram(s)/kG/Min IV Continuous <Continuous>  acetaminophen   Tablet. 650milliGRAM(s) Oral once  epoetin alba Injectable 97569Mush(s) IV Push <User Schedule>  levETIRAcetam  IVPB 500milliGRAM(s) IV Intermittent daily  levETIRAcetam  IVPB 250milliGRAM(s) IV Intermittent daily  ertapenem  IVPB 500milliGRAM(s) IV Intermittent every 24 hours    MEDICATIONS  (PRN):  dextrose Gel 1Dose(s) Oral once PRN Blood Glucose LESS THAN 70 milliGRAM(s)/deciliter  glucagon  Injectable 1milliGRAM(s) IntraMuscular once PRN Glucose LESS THAN 70 milligrams/deciliter  acetaminophen   Tablet 650milliGRAM(s) Oral every 6 hours PRN For Temp greater than 38 C (100.4 F)  acetaminophen    Suspension. 650milliGRAM(s) Oral every 6 hours PRN Moderate Pain (4 - 6)  artificial tears (preservative free) Ophthalmic Solution 1Drop(s) Both EYES three times a day PRN Dry Eyes  sodium chloride 0.65% Nasal 1Spray(s) Both Nostrils four times a day PRN Nasal Congestion       Vital Signs Last 24 Hrs  T(C): 36.6, Max: 36.7 (06-06 @ 06:04)  T(F): 97.8, Max: 98 (06-06 @ 06:04)  HR: 89 (77 - 102)  BP: 88/42 (88/42 - 108/45)  BP(mean): --  RR: 12 (12 - 22)  SpO2: 100% (97% - 100%)        I&O's Summary    I & Os for current day (as of 06 Jun 2017 09:45)  =============================================  IN: 917.6 ml / OUT: 1600 ml / NET: -682.4 ml      Physical Exam:   GENERAL: NAD, well-groomed, well-developed  HEENT: BELL/   Atraumatic, Normocephalic  ENMT: No tonsillar erythema, exudates, or enlargement; Moist mucous membranes, Good dentition, No lesions  NECK: JVD+  CHEST/LUNG: BILATERAL CRACKLES  CVS: Regular rate and rhythm; No murmurs, rubs, or gallops  GI: : Soft, Nontender, Nondistended; Bowel sounds present  NERVOUS SYSTEM:  Alert & Oriented X3,     EXTREMITIES:  MILD EDEMA  LYMPH: No lymphadenopathy noted  SKIN: No rashes or lesions  ENDOCRINOLOGY: No Thyromegaly  PSYCH: Appropriate/demented/others    Labs:                   7.8    14.78 )-----------( 168      ( 06 Jun 2017 07:29 )             24.8     06-06    134<L>  |  96<L>  |  23  ----------------------------<  113<H>  3.9   |  26  |  5.85<H>    Ca    8.9      06 Jun 2017 07:29  Mg     2.1     06-06      CAPILLARY BLOOD GLUCOSE  124 (06 Jun 2017 08:15)  152 (05 Jun 2017 22:18)  116 (05 Jun 2017 16:50)  137 (05 Jun 2017 11:52)      PT/INR - ( 06 Jun 2017 07:29 )   PT: 26.2 SEC;   INR: 2.30          Wound culture:                06-03 @ 12:50  Organism --  Culture w/ gram stain --  Specimen Source BLOOD    Wound culture:                06-02 @ 16:29  Organism --  Culture w/ gram stain --  Specimen Source BLOOD        Abscess culture:             06-03 @ 12:50  Organism --  Gram Stain --  Specimen Source BLOOD    Abscess culture:             06-02 @ 16:29  Organism --  Gram Stain --  Specimen Source BLOOD            Tissue culture:           06-03 @ 12:50  Organism --  Gram Stain --  Specimen Source BLOOD    Tissue culture:           06-02 @ 16:29  Organism --  Gram Stain --  Specimen Source BLOOD        Body Fluid Smear & Culture:                        06-03 @ 12:50  AFB Smear  --  Culture Acid Fast Body Fluid w/ Smear  --  Culture Acid Fast Smear Concentrated   --    Culture Results:     --  Specimen Source BLOOD    Body Fluid Smear & Culture:                        06-02 @ 16:29  AFB Smear  --  Culture Acid Fast Body Fluid w/ Smear  --  Culture Acid Fast Smear Concentrated   --    Culture Results:     --  Specimen Source BLOOD                Studies  Chest X-RAY  CT SCAN Chest   IMPRESSION:    Extensive pulmonary fibrosis.    Evidence of superimposed mild small airway disease.  CT Abdomen  Venous Dopplers: LE:   Others    ------------------------------------------------------------------------  CONCLUSIONS:  1. Mild left atrial enlargement. LA volume index = 34  cc/m2.  2. Normal left ventricular internal dimensions and wall  thicknesses.  3. Severe global left ventricular systolic dysfunction.  4. Severe diastolic dysfunction.  5. Moderate right atrial enlargement.  6. A device wire is noted in the right heart. Right  ventricular enlargement withdecreased right ventricular  systolic function.  7. Normal tricuspid valve.    Moderate tricuspid  regurgitation.  8. Estimated pulmonary artery systolic pressure equals 30  mm Hg, assuming right atrial pressure equals 10  mm Hg,  consistent with normal pulmonary pressures.

## 2017-06-06 NOTE — CONSULT NOTE ADULT - PROBLEM SELECTOR RECOMMENDATION 5
Acute on chronic systolic CHF - continue inotropic support as per Cardio, HD for maximum fluid removal
Pt with end stage heart failure and ESRD. Requires assistance with some of his ADLs  - c/w supportive care  - PT as tolerated  - reports no issues with appetite

## 2017-06-06 NOTE — EEG REPORT - NS EEG TEXT BOX
Date: 6-6-17    Indication: Concern for seizure     FINDINGS:  The background was continuous, spontaneously variable and reactive.  During wakefulness, the posteriorly dominant rhythm consisted of poorly modulated 7 Hz activity, with an amplitude to 20 uV, that attenuated to eye opening.  Low amplitude central beta was noted in wakefulness.    Background Slowing:  Generalized irregular theta and polymorphic delta activity was present     Sleep Background:  Stage II sleep transients were not recorded.    Epileptiform Activity:   No epileptiform discharges were present.    Events:  No clinical events were recorded.  No seizures were recorded.    Activation Procedures:   -Hyperventilation was not performed.    -Photic stimulation was not performed.    Artifacts:  Intermittent myogenic and movement artifacts were noted.    ECG:  The heart rate on single channel ECG was difficult to discern due to artifact     Compressed Spectral Array Digital Analysis    FINDINGS:  Compressed Spectral Array (CSA) data was reviewed separately and correlated with the electroencephalographic findings detailed above.  CSA showed a variable spectral pattern.  Areas of increased power in particular were reviewed in detail, and compared with the raw EEG data.  Areas of abrupt increases in spectral power were reviewed to exclude seizures, and were determined to be artifactual in nature.    The relative ratio of the power of delta range frequencies and faster frequencies remained stable over the course of the study.  There was no definitive increase in the relative power in the delta frequency spectrum apparent in the left hemisphere versus the right hemisphere.      Compressed Spectral Array (Digital Analysis) Summary/ Impression:  No persistent hemispheric asymmetry.  Intermittent areas of increased power reviewed, without definite epileptiform activity associated on CSA.      EEG Classification / Summary:  Abnormal Routine EEG Study   Moderate generalized slowing   Clinical Impression:  Findings indicate moderate diffuse or multifocal cerebral dysfunction.  There were no epileptiform abnormalities recorded.      prelim read by fellow Date: 6-6-17    Indication: Concern for seizure     FINDINGS:  The background was continuous, spontaneously variable and reactive.  During wakefulness, the posteriorly dominant rhythm consisted of poorly modulated 7 Hz activity, with an amplitude to 20 uV, that attenuated to eye opening.  Low amplitude central beta was noted in wakefulness.    Background Slowing:  Generalized irregular theta and polymorphic delta activity was present     Sleep Background:  Stage II sleep transients were not recorded.    Epileptiform Activity:   No epileptiform discharges were present.    Events:  No clinical events were recorded.  No seizures were recorded.    Activation Procedures:   -Hyperventilation was not performed.    -Photic stimulation was not performed.    Artifacts:  Intermittent myogenic and movement artifacts were noted.    ECG:  The heart rate on single channel ECG was difficult to discern due to artifact     Compressed Spectral Array Digital Analysis    FINDINGS:  Compressed Spectral Array (CSA) data was reviewed separately and correlated with the electroencephalographic findings detailed above.  CSA showed a variable spectral pattern.  Areas of increased power in particular were reviewed in detail, and compared with the raw EEG data.  Areas of abrupt increases in spectral power were reviewed to exclude seizures, and were determined to be artifactual in nature.    The relative ratio of the power of delta range frequencies and faster frequencies remained stable over the course of the study.  There was no definitive increase in the relative power in the delta frequency spectrum apparent in the left hemisphere versus the right hemisphere.      Compressed Spectral Array (Digital Analysis) Summary/ Impression:  No persistent hemispheric asymmetry.  Intermittent areas of increased power reviewed, without definite epileptiform activity associated on CSA.      EEG Classification / Summary:  Abnormal Routine EEG Study   Moderate generalized slowing   Clinical Impression:  Findings indicate moderate diffuse or multifocal cerebral dysfunction.  There were no epileptiform abnormalities recorded.

## 2017-06-06 NOTE — CONSULT NOTE ADULT - PROBLEM SELECTOR RECOMMENDATION 4
has bronchiectasis, and some small airways disease: start symbicort
Pt with dyspnea on exertion, reports shortness of breath while at rest has improved since admission  - pt not in any distress currently  - c/w O2 supplementation

## 2017-06-06 NOTE — PROGRESS NOTE ADULT - SUBJECTIVE AND OBJECTIVE BOX
Pt seen and examined at bedside  Pt just finished HD. On venti mask. Reports breathing stable.   Denies chest pain. Has chronic back pain at this time.     Allergies:  No Known Allergies    Hospital Medications:   MEDICATIONS  (STANDING):  finasteride 5milliGRAM(s) Oral daily  amiodarone    Tablet 200milliGRAM(s) Oral daily  atorvastatin 20milliGRAM(s) Oral at bedtime  docusate sodium 100milliGRAM(s) Oral daily  midodrine 30milliGRAM(s) Oral every 8 hours  sevelamer hydrochloride 800milliGRAM(s) Oral three times a day with meals  levothyroxine 75MICROGram(s) Oral daily  insulin lispro (HumaLOG) corrective regimen sliding scale  SubCutaneous three times a day before meals  insulin lispro (HumaLOG) corrective regimen sliding scale  SubCutaneous at bedtime  dextrose 5%. 1000milliLiter(s) IV Continuous <Continuous>  dextrose 50% Injectable 12.5Gram(s) IV Push once  dextrose 50% Injectable 25Gram(s) IV Push once  dextrose 50% Injectable 25Gram(s) IV Push once  DOBUTamine Infusion 7MICROgram(s)/kG/Min IV Continuous <Continuous>  acetaminophen   Tablet. 650milliGRAM(s) Oral once  epoetin alba Injectable [id number](s) IV Push <User Schedule>  levETIRAcetam  IVPB 500milliGRAM(s) IV Intermittent daily  levETIRAcetam  IVPB 250milliGRAM(s) IV Intermittent daily  ertapenem  IVPB 500milliGRAM(s) IV Intermittent every 24 hours  buDESOnide 160 MICROgram(s)/formoterol 4.5 MICROgram(s) Inhaler 2Puff(s) Inhalation two times a day  warfarin 0.5milliGRAM(s) Oral once      VITALS:  T(F): 97.6, Max: 98 (06-06 @ 06:04)  HR: 79  BP: 92/43  RR: 12  SpO2: 100%  Wt(kg): --  I & Os for 24h ending 06-06 @ 07:00  =============================================  IN: 917.6 ml / OUT: 1600 ml / NET: -682.4 ml    I & Os for current day (as of 06-06 @ 11:59)  =============================================  IN: 400 ml / OUT: 1900 ml / NET: -1500 ml      PHYSICAL EXAM:  Constitutional: NAD  HEENT: anicteric sclera, oropharynx clear, MMM  Neck: No JVD  Respiratory: Bibasilar ronchi   Cardiovascular: S1, S2, RRR  Gastrointestinal: BS+, soft, NT/ND  Extremities: No cyanosis or clubbing. Trace peripheral edema  Neurological: A/O x 3, no focal deficits  Psychiatric: Normal mood, normal affect  : No CVA tenderness. No rosa.   Skin: No rashes  Vascular Access: RIJ Tunneled cath     LABS:  06-06    134<L>  |  96<L>  |  23  ----------------------------<  113<H>  3.9   |  26  |  5.85<H>    Ca    8.9      06 Jun 2017 07:29  Mg     2.1     06-06      Creatinine Trend: 5.85 <--, 6.38 <--, 4.82 <--, 7.27 <--, 6.40 <--, 5.08 <--, 6.76 <--                        7.8    14.78 )-----------( 168      ( 06 Jun 2017 07:29 )             24.8

## 2017-06-06 NOTE — CONSULT NOTE ADULT - PROBLEM SELECTOR RECOMMENDATION 2
HCAP:  Cont antibiotics: ID following   Given the CT scan it is very hard to determine whether there is any underlying pneumonia or not !
Pt was on HD three times a week prior to this admission, currently requiring more frequent HD/UF  - will have to determine plan from Renal and Heart failure team if increased frequency of HD/UF is temporary or pt will need it for long term

## 2017-06-06 NOTE — CONSULT NOTE ADULT - PROBLEM SELECTOR RECOMMENDATION 9
BiPAP: 10/5 50%  LAST ABG A WEEK AGO: 7.43/40/143  Pt on 50% Fio2
Likely febrile convulsions.   Would obtain 24 hr EEG. However, pt on BIPAP which may impede lead placement. Will place if possible but if not will wait until pt stabilized off BIPAP.  No AEDS for now.  Seizure precautions  neuro checks q4  Consider MRI when stable if weakness does not improve.
Pt with EF of 19%, on dobutamine at home and has needed increased dose of dobutamine during this hospital stay. Has also been requiring more frequent dialysis for volume overload   - Heart failure team following and recommended palliative care eval  - c/w dobutamine  - pt also with chronic hypotension and on midodrine 30 mg po q8h

## 2017-06-06 NOTE — PROGRESS NOTE ADULT - ASSESSMENT
61 y/o male with a PMHx of NICM with severe LV dysfunction (EF 19%) S/P Biotronic ICD placement on Dobutamine gtt via chronic left upper extremity PICC line, ESRD on HD M/W/F, atrial fibrillation on Coumadin, COPD on 4L home O2, HLD, DM, chronic hypotension on Midodrine, presents to ED S/P syncopal episode. Concern for new weakness, SOB, fever. With onset of green sputum, consider possibility of new onset HCAP. Multifactorial respiratory distress on bipap. Would be concerned for fluid overload in this patient with EF 19%, avoid zosyn if possible. Worsening resp distress with minimal evidence of pneumonia on CT.  - Ertapenem 500 q 24 through 6/7/17 to complete 10 days then discontinue  - CHF/Pulm fibrosis per primary team  - Low threshold for MICU eval  - Palliative eval poor prognosis  - Will sign off. Please call with further questions.      Carlos Manuel Medellin MD  Pager [phone]  After 5pm and on weekends call [phone]

## 2017-06-06 NOTE — PROGRESS NOTE ADULT - PROBLEM SELECTOR PLAN 1
Pt had PUF yesteday, and completed HD today. Flowsheet reviewed, 1.5kg UF achieved. BP low but stable during treatment. Reassess daily for UF needs.   c/w midodrine  Despite near daily dialysis/uf treatment to help optimize fluid status, pt continues to have pulm edema and dyspnea. Dialysis not adequate to maintain euvolemia, in setting of severe, dobutamine dependent CHF and now new dx of pulm fibrosis. Very poor prognosis.   Moreover, it will not be logistically possible to have permanent 4 times weekly HD treatments in outpt HD unit.  Will continue ongoing discussion with pt and his wife regarding GOC, and ultimately futile to continue HD. Pt is full code at this time.    c/w renal diet, fluid restriction.

## 2017-06-06 NOTE — CONSULT NOTE ADULT - ASSESSMENT
63 yo M with acute on chronic severe systolic CHF (EF 19%), ESRD, DM2, A fib, HCAP  Yesterday pt had acute resp distress with accidental choking on Dentures: currently patient has been doing ok

## 2017-06-06 NOTE — PROGRESS NOTE ADULT - SUBJECTIVE AND OBJECTIVE BOX
CHIEF COMPLAINT:Patient is a 62y old  Male who presents with a chief complaint of sob (09 May 2017 15:32)    	sob approx on f/m o2        PAST MEDICAL & SURGICAL HISTORY:  Chronic hypotension  AF (atrial fibrillation)  COPD (chronic obstructive pulmonary disease): 4L home O2  HLD (hyperlipidemia)  DM (diabetes mellitus)  ESRD (end stage renal disease) on dialysis  BPH (benign prostatic hypertrophy)  Myocardial infarction: 10/2011  Chronic renal insufficiency  Gout  Dyslipidemia  Diabetes mellitus  CHF (congestive heart failure)  AICD (automatic cardioverter/defibrillator) present: Biotronic - placed 9/11/09  H/O coronary angiogram          REVIEW OF SYSTEMS:  CONSTITUTIONAL: No fever, weight loss, or fatigue  EYES: No eye pain, visual disturbances, or discharge  NECK: No pain or stiffness  RESPIRATORY: sob approximately the same   CARDIOVASCULAR: No chest pain, palpitations, passing out, dizziness, or leg swelling  GASTROINTESTINAL: No abdominal or epigastric pain. No nausea, vomiting, or hematemesis; No diarrhea or constipation. No melena or hematochezia.  GENITOURINARY: No dysuria, frequency, hematuria, or incontinence  NEUROLOGICAL: No headaches, memory loss, loss of strength, numbness, or tremors  SKIN: No itching, burning, rashes, or lesions   LYMPH Nodes: No enlarged glands  ENDOCRINE: No heat or cold intolerance; No hair loss  MUSCULOSKELETAL: No joint pain or swelling; No muscle, back, or extremity pain    Medications:  MEDICATIONS  (STANDING):  finasteride 5milliGRAM(s) Oral daily  amiodarone    Tablet 200milliGRAM(s) Oral daily  atorvastatin 20milliGRAM(s) Oral at bedtime  docusate sodium 100milliGRAM(s) Oral daily  midodrine 30milliGRAM(s) Oral every 8 hours  sevelamer hydrochloride 800milliGRAM(s) Oral three times a day with meals  levothyroxine 75MICROGram(s) Oral daily  insulin lispro (HumaLOG) corrective regimen sliding scale  SubCutaneous three times a day before meals  insulin lispro (HumaLOG) corrective regimen sliding scale  SubCutaneous at bedtime  dextrose 5%. 1000milliLiter(s) IV Continuous <Continuous>  dextrose 50% Injectable 12.5Gram(s) IV Push once  dextrose 50% Injectable 25Gram(s) IV Push once  dextrose 50% Injectable 25Gram(s) IV Push once  DOBUTamine Infusion 7MICROgram(s)/kG/Min IV Continuous <Continuous>  acetaminophen   Tablet. 650milliGRAM(s) Oral once  epoetin alba Injectable 94998Lsuz(s) IV Push <User Schedule>  levETIRAcetam  IVPB 500milliGRAM(s) IV Intermittent daily  levETIRAcetam  IVPB 250milliGRAM(s) IV Intermittent daily  ertapenem  IVPB 500milliGRAM(s) IV Intermittent every 24 hours  buDESOnide 160 MICROgram(s)/formoterol 4.5 MICROgram(s) Inhaler 2Puff(s) Inhalation two times a day    MEDICATIONS  (PRN):  dextrose Gel 1Dose(s) Oral once PRN Blood Glucose LESS THAN 70 milliGRAM(s)/deciliter  glucagon  Injectable 1milliGRAM(s) IntraMuscular once PRN Glucose LESS THAN 70 milligrams/deciliter  acetaminophen   Tablet 650milliGRAM(s) Oral every 6 hours PRN For Temp greater than 38 C (100.4 F)  acetaminophen    Suspension. 650milliGRAM(s) Oral every 6 hours PRN Moderate Pain (4 - 6)  artificial tears (preservative free) Ophthalmic Solution 1Drop(s) Both EYES three times a day PRN Dry Eyes  sodium chloride 0.65% Nasal 1Spray(s) Both Nostrils four times a day PRN Nasal Congestion    	    PHYSICAL EXAM:  T(C): 36.4, Max: 36.7 (06-06 @ 06:04)  HR: 79 (77 - 102)  BP: 92/43 (88/42 - 108/45)  RR: 12 (12 - 22)  SpO2: 100% (97% - 100%)  Wt(kg): --  I&O's Summary  I & Os for 24h ending 06 Jun 2017 07:00  =============================================  IN: 917.6 ml / OUT: 1600 ml / NET: -682.4 ml    I & Os for current day (as of 06 Jun 2017 11:34)  =============================================  IN: 400 ml / OUT: 1900 ml / NET: -1500 ml      Appearance: Normal	  HEENT:   Normal oral mucosa, PERRL, EOMI	  Lymphatic: No lymphadenopathy  Cardiovascular: Normal S1 S2, No JVD, No murmurs, No edema  Respiratory: Lungs clear to auscultation	  Psychiatry: A & O x 3, Mood & affect appropriate  Gastrointestinal:  Soft, Non-tender, + BS	  Skin: No rashes, No ecchymoses, No cyanosis	  Neurologic: Non-focal  Extremities: Normal range of motion, No clubbing, cyanosis or edema  Vascular: Peripheral pulses palpable 2+ bilaterally    TELEMETRY: 	    ECG:  	  RADIOLOGY:  OTHER: 	  	  LABS:	 	    CARDIAC MARKERS:                                7.8    14.78 )-----------( 168      ( 06 Jun 2017 07:29 )             24.8     06-06    134<L>  |  96<L>  |  23  ----------------------------<  113<H>  3.9   |  26  |  5.85<H>    Ca    8.9      06 Jun 2017 07:29  Mg     2.1     06-06      proBNP:   Lipid Profile:   HgA1c:   TSH:

## 2017-06-07 LAB
BACTERIA BLD CULT: SIGNIFICANT CHANGE UP
BUN SERPL-MCNC: 17 MG/DL — SIGNIFICANT CHANGE UP (ref 7–23)
CALCIUM SERPL-MCNC: 9.3 MG/DL — SIGNIFICANT CHANGE UP (ref 8.4–10.5)
CHLORIDE SERPL-SCNC: 94 MMOL/L — LOW (ref 98–107)
CO2 SERPL-SCNC: 28 MMOL/L — SIGNIFICANT CHANGE UP (ref 22–31)
CREAT SERPL-MCNC: 4.92 MG/DL — HIGH (ref 0.5–1.3)
GLUCOSE SERPL-MCNC: 94 MG/DL — SIGNIFICANT CHANGE UP (ref 70–99)
HCT VFR BLD CALC: 26.8 % — LOW (ref 39–50)
HGB BLD-MCNC: 8.2 G/DL — LOW (ref 13–17)
INR BLD: 1.81 — HIGH (ref 0.88–1.17)
MAGNESIUM SERPL-MCNC: 1.9 MG/DL — SIGNIFICANT CHANGE UP (ref 1.6–2.6)
MCHC RBC-ENTMCNC: 28.2 PG — SIGNIFICANT CHANGE UP (ref 27–34)
MCHC RBC-ENTMCNC: 30.6 % — LOW (ref 32–36)
MCV RBC AUTO: 92.1 FL — SIGNIFICANT CHANGE UP (ref 80–100)
PLATELET # BLD AUTO: 183 K/UL — SIGNIFICANT CHANGE UP (ref 150–400)
PMV BLD: 11.5 FL — SIGNIFICANT CHANGE UP (ref 7–13)
POTASSIUM SERPL-MCNC: 3.7 MMOL/L — SIGNIFICANT CHANGE UP (ref 3.5–5.3)
POTASSIUM SERPL-SCNC: 3.7 MMOL/L — SIGNIFICANT CHANGE UP (ref 3.5–5.3)
PROTHROM AB SERPL-ACNC: 20.5 SEC — HIGH (ref 9.8–13.1)
RBC # BLD: 2.91 M/UL — LOW (ref 4.2–5.8)
RBC # FLD: 20.4 % — HIGH (ref 10.3–14.5)
SODIUM SERPL-SCNC: 135 MMOL/L — SIGNIFICANT CHANGE UP (ref 135–145)
WBC # BLD: 15.03 K/UL — HIGH (ref 3.8–10.5)
WBC # FLD AUTO: 15.03 K/UL — HIGH (ref 3.8–10.5)

## 2017-06-07 RX ORDER — WARFARIN SODIUM 2.5 MG/1
2 TABLET ORAL ONCE
Qty: 0 | Refills: 0 | Status: COMPLETED | OUTPATIENT
Start: 2017-06-07 | End: 2017-06-07

## 2017-06-07 RX ADMIN — MIDODRINE HYDROCHLORIDE 30 MILLIGRAM(S): 2.5 TABLET ORAL at 06:11

## 2017-06-07 RX ADMIN — WARFARIN SODIUM 2 MILLIGRAM(S): 2.5 TABLET ORAL at 18:13

## 2017-06-07 RX ADMIN — BUDESONIDE AND FORMOTEROL FUMARATE DIHYDRATE 2 PUFF(S): 160; 4.5 AEROSOL RESPIRATORY (INHALATION) at 22:00

## 2017-06-07 RX ADMIN — LEVETIRACETAM 400 MILLIGRAM(S): 250 TABLET, FILM COATED ORAL at 14:07

## 2017-06-07 RX ADMIN — Medication 100 MILLIGRAM(S): at 14:07

## 2017-06-07 RX ADMIN — SEVELAMER CARBONATE 800 MILLIGRAM(S): 2400 POWDER, FOR SUSPENSION ORAL at 18:14

## 2017-06-07 RX ADMIN — Medication 20.85 MICROGRAM(S)/KG/MIN: at 06:10

## 2017-06-07 RX ADMIN — ATORVASTATIN CALCIUM 20 MILLIGRAM(S): 80 TABLET, FILM COATED ORAL at 22:00

## 2017-06-07 RX ADMIN — Medication 20.85 MICROGRAM(S)/KG/MIN: at 22:00

## 2017-06-07 RX ADMIN — AMIODARONE HYDROCHLORIDE 200 MILLIGRAM(S): 400 TABLET ORAL at 06:10

## 2017-06-07 RX ADMIN — MIDODRINE HYDROCHLORIDE 30 MILLIGRAM(S): 2.5 TABLET ORAL at 14:06

## 2017-06-07 RX ADMIN — BUDESONIDE AND FORMOTEROL FUMARATE DIHYDRATE 2 PUFF(S): 160; 4.5 AEROSOL RESPIRATORY (INHALATION) at 09:47

## 2017-06-07 RX ADMIN — SEVELAMER CARBONATE 800 MILLIGRAM(S): 2400 POWDER, FOR SUSPENSION ORAL at 14:06

## 2017-06-07 RX ADMIN — Medication 75 MICROGRAM(S): at 06:11

## 2017-06-07 RX ADMIN — SEVELAMER CARBONATE 800 MILLIGRAM(S): 2400 POWDER, FOR SUSPENSION ORAL at 09:47

## 2017-06-07 RX ADMIN — MIDODRINE HYDROCHLORIDE 30 MILLIGRAM(S): 2.5 TABLET ORAL at 21:59

## 2017-06-07 NOTE — PROGRESS NOTE ADULT - SUBJECTIVE AND OBJECTIVE BOX
Pt seen and examined at bedside  No acute events overnight. Dyspnea somewhat better today.     Allergies:  No Known Allergies    Hospital Medications:   MEDICATIONS  (STANDING):  finasteride 5milliGRAM(s) Oral daily  amiodarone    Tablet 200milliGRAM(s) Oral daily  atorvastatin 20milliGRAM(s) Oral at bedtime  docusate sodium 100milliGRAM(s) Oral daily  midodrine 30milliGRAM(s) Oral every 8 hours  sevelamer hydrochloride 800milliGRAM(s) Oral three times a day with meals  levothyroxine 75MICROGram(s) Oral daily  insulin lispro (HumaLOG) corrective regimen sliding scale  SubCutaneous three times a day before meals  insulin lispro (HumaLOG) corrective regimen sliding scale  SubCutaneous at bedtime  dextrose 5%. 1000milliLiter(s) IV Continuous <Continuous>  dextrose 50% Injectable 12.5Gram(s) IV Push once  dextrose 50% Injectable 25Gram(s) IV Push once  dextrose 50% Injectable 25Gram(s) IV Push once  DOBUTamine Infusion 7MICROgram(s)/kG/Min IV Continuous <Continuous>  acetaminophen   Tablet. 650milliGRAM(s) Oral once  levETIRAcetam  IVPB 500milliGRAM(s) IV Intermittent daily  levETIRAcetam  IVPB 250milliGRAM(s) IV Intermittent daily  ertapenem  IVPB 500milliGRAM(s) IV Intermittent every 24 hours  buDESOnide 160 MICROgram(s)/formoterol 4.5 MICROgram(s) Inhaler 2Puff(s) Inhalation two times a day  epoetin alba Injectable 23019Pxjl(s) IV Push <User Schedule>    VITALS:  T(F): 97.4, Max: 98.1 (06-06 @ 20:21)  HR: 80  BP: 129/43  RR: 14  SpO2: 100%  Wt(kg): --    I & Os for current day (as of 06-07 @ 09:49)  =============================================  IN: 699.6 ml / OUT: 1900 ml / NET: -1200.4 ml      PHYSICAL EXAM:  Constitutional: NAD  HEENT: anicteric sclera, oropharynx clear, MMM  Neck: No JVD  Respiratory: b/l CTA, no wheeze or crackles.   Cardiovascular: S1, S2, RRR  Gastrointestinal: BS+, soft, NT/ND  Extremities: No cyanosis or clubbing. Trace peripheral edema  Neurological: A/O x 3, no focal deficits  Psychiatric: Normal mood, normal affect  : No CVA tenderness. No rosa.   Skin: No rashes  Vascular Access: [facility] Tunneled cath     LABS:  06-07    135  |  94<L>  |  17  ----------------------------<  94  3.7   |  28  |  4.92<H>    Ca    9.3      07 Jun 2017 07:50  Mg     1.9     06-07      Creatinine Trend: 4.92 <--, 5.85 <--, 6.38 <--, 4.82 <--, 7.27 <--, 6.40 <--, 5.08 <--, 6.76 <--                        8.2    15.03 )-----------( 183      ( 07 Jun 2017 07:50 )             26.8

## 2017-06-07 NOTE — PROGRESS NOTE ADULT - ASSESSMENT
Patient is a 62y Male with severe heart failure, ESRD on HD a/w syncope and volume overload. on Dobutamine drip. Renal following for HD/PUF, for fluid Mx. w/pulm edema, resp failure on BiPAP, s/p fever, seizurem, hypotension, and pulm fibrosis

## 2017-06-07 NOTE — PROGRESS NOTE ADULT - PROBLEM SELECTOR PLAN 1
Pt had PUF 6/5 and HD 6/6. K acceptable, improved volume status today. No acute need for HD today.   Will plan for HD tomorrow.    c/w midodrine. Very poor prognosis.   c/w renal diet, fluid restriction.

## 2017-06-07 NOTE — PROGRESS NOTE ADULT - SUBJECTIVE AND OBJECTIVE BOX
Patient is a 62y old  Male who presents with a chief complaint of sob (09 May 2017 15:32)  on 50% of oxygen  family at bedside as well as I have spoken to his sister on phone       Any change in ROS:     MEDICATIONS  (STANDING):  finasteride 5milliGRAM(s) Oral daily  amiodarone    Tablet 200milliGRAM(s) Oral daily  atorvastatin 20milliGRAM(s) Oral at bedtime  docusate sodium 100milliGRAM(s) Oral daily  midodrine 30milliGRAM(s) Oral every 8 hours  sevelamer hydrochloride 800milliGRAM(s) Oral three times a day with meals  levothyroxine 75MICROGram(s) Oral daily  insulin lispro (HumaLOG) corrective regimen sliding scale  SubCutaneous three times a day before meals  insulin lispro (HumaLOG) corrective regimen sliding scale  SubCutaneous at bedtime  dextrose 5%. 1000milliLiter(s) IV Continuous <Continuous>  dextrose 50% Injectable 12.5Gram(s) IV Push once  dextrose 50% Injectable 25Gram(s) IV Push once  dextrose 50% Injectable 25Gram(s) IV Push once  DOBUTamine Infusion 7MICROgram(s)/kG/Min IV Continuous <Continuous>  acetaminophen   Tablet. 650milliGRAM(s) Oral once  levETIRAcetam  IVPB 500milliGRAM(s) IV Intermittent daily  levETIRAcetam  IVPB 250milliGRAM(s) IV Intermittent daily  ertapenem  IVPB 500milliGRAM(s) IV Intermittent every 24 hours  buDESOnide 160 MICROgram(s)/formoterol 4.5 MICROgram(s) Inhaler 2Puff(s) Inhalation two times a day  epoetin alba Injectable 57284Ujha(s) IV Push <User Schedule>  warfarin 2milliGRAM(s) Oral once    MEDICATIONS  (PRN):  dextrose Gel 1Dose(s) Oral once PRN Blood Glucose LESS THAN 70 milliGRAM(s)/deciliter  glucagon  Injectable 1milliGRAM(s) IntraMuscular once PRN Glucose LESS THAN 70 milligrams/deciliter  acetaminophen   Tablet 650milliGRAM(s) Oral every 6 hours PRN For Temp greater than 38 C (100.4 F)  acetaminophen    Suspension. 650milliGRAM(s) Oral every 6 hours PRN Moderate Pain (4 - 6)  artificial tears (preservative free) Ophthalmic Solution 1Drop(s) Both EYES three times a day PRN Dry Eyes  sodium chloride 0.65% Nasal 1Spray(s) Both Nostrils four times a day PRN Nasal Congestion    Vital Signs Last 24 Hrs  T(C): 36.7, Max: 36.7 (06-06 @ 20:21)  T(F): 98, Max: 98.1 (06-06 @ 20:21)  HR: 81 (78 - 83)  BP: 85/50 (85/50 - 129/43)  BP(mean): --  RR: 18 (14 - 18)  SpO2: 99% (98% - 100%)    I&O's Summary    I & Os for current day (as of 07 Jun 2017 15:54)  =============================================  IN: 699.6 ml / OUT: 1900 ml / NET: -1200.4 ml        Physical Exam:   GENERAL: NAD, well-groomed, well-developed  HEENT: BELL/   Atraumatic, Normocephalic  ENMT: No tonsillar erythema, exudates, or enlargement; Moist mucous membranes, Good dentition, No lesions  NECK: Supple, No JVD, Normal thyroid  CHEST/LUNG: Clear to percussion bilaterally; No rales, rhonchi, wheezing, or rubs  CVS: Regular rate and rhythm; No murmurs, rubs, or gallops  GI: : Soft, Nontender, Nondistended; Bowel sounds present  NERVOUS SYSTEM:  Alert & Oriented X2  EXTREMITIES:  2+ Peripheral Pulses, No clubbing, cyanosis, or edema  LYMPH: No lymphadenopathy noted  SKIN: No rashes or lesions  ENDOCRINOLOGY: No Thyromegaly  PSYCH: Appropriate    Labs:                              8.2    15.03 )-----------( 183      ( 07 Jun 2017 07:50 )             26.8     CBC Full  -  ( 07 Jun 2017 07:50 )  WBC Count : 15.03 K/uL  Hemoglobin : 8.2 g/dL  Hematocrit : 26.8 %  Platelet Count - Automated : 183 K/uL  Auto Neutrophil % : x  Auto Eosinophil % : x     CBC Full  -  ( 06 Jun 2017 07:29 )  WBC Count : 14.78 K/uL  Hemoglobin : 7.8 g/dL  Hematocrit : 24.8 %  Platelet Count - Automated : 168 K/uL  Auto Neutrophil % : x  Auto Eosinophil % : x     CBC Full  -  ( 05 Jun 2017 14:20 )  WBC Count : 16.65 K/uL  Hemoglobin : 7.8 g/dL  Hematocrit : 25.8 %  Platelet Count - Automated : 168 K/uL  Auto Neutrophil % : x  Auto Eosinophil % : x     CBC Full  -  ( 04 Jun 2017 07:00 )  WBC Count : 17.06 K/uL  Hemoglobin : 8.2 g/dL  Hematocrit : 26.3 %  Platelet Count - Automated : 168 K/uL  Auto Neutrophil % : x  Auto Eosinophil % : x         06-07    135  |  94<L>  |  17  ----------------------------<  94  3.7   |  28  |  4.92<H>    Ca    9.3      07 Jun 2017 07:50  Mg     1.9     06-07      CAPILLARY BLOOD GLUCOSE  105 (07 Jun 2017 11:41)  98 (07 Jun 2017 08:25)  126 (06 Jun 2017 22:12)  108 (06 Jun 2017 17:14)      PT/INR - ( 07 Jun 2017 07:50 )   PT: 20.5 SEC;   INR: 1.81                  Cultures:                   Wound culture:                06-03 @ 12:50  Organism --  Culture w/ gram stain --  Specimen Source BLOOD    Wound culture:                06-02 @ 16:29  Organism --  Culture w/ gram stain --  Specimen Source BLOOD        Abscess culture:             06-03 @ 12:50  Organism --  Gram Stain --  Specimen Source BLOOD    Abscess culture:             06-02 @ 16:29  Organism --  Gram Stain --  Specimen Source BLOOD            Tissue culture:           06-03 @ 12:50  Organism --  Gram Stain --  Specimen Source BLOOD    Tissue culture:           06-02 @ 16:29  Organism --  Gram Stain --  Specimen Source BLOOD        Body Fluid Smear & Culture:                        06-03 @ 12:50  AFB Smear  --  Culture Acid Fast Body Fluid w/ Smear  --  Culture Acid Fast Smear Concentrated   --    Culture Results:     --  Specimen Source BLOOD    Body Fluid Smear & Culture:                        06-02 @ 16:29  AFB Smear  --  Culture Acid Fast Body Fluid w/ Smear  --  Culture Acid Fast Smear Concentrated   --    Culture Results:     --  Specimen Source BLOOD                Studies  Chest X-RAY  CT SCAN Chest   IMPRESSION:    Extensive pulmonary fibrosis.    Evidence of superimposed mild small airway diseas  CT Abdomen  Venous Dopplers: LE:   Others

## 2017-06-07 NOTE — PROGRESS NOTE ADULT - ASSESSMENT
63 yo Male  with acute on chronic severe systolic CHF (EF 19%), ESRD, DM2, A fib, HCAP  - HCAP - abs , follow up ID, , BiPAP   ct chest noted / pulm eval noted   - Acute on chronic systolic CHF - continue inotropic support as per Cardio, HD for maximum fluid removal  - ESRD - continue HD as per Renal, continue Midodrine. Renal to address possibility of outpatient HD, as pt refusing any palliative care options.  - A fib - continue Amio, follow INR,   - DM2 - controlled, continue with ISS  - Prognosis very poor, explained to patient and family, however, patient is refusing palliative input at this time and wants aggressive life support measures.

## 2017-06-07 NOTE — PROGRESS NOTE ADULT - ASSESSMENT
61 yo M with acute on chronic severe systolic CHF (EF 19%), ESRD, DM2, A fib, HCAP  Yesterday pt had acute resp distress with accidental choking on Dentures: currently patient has been doing ok

## 2017-06-07 NOTE — PROGRESS NOTE ADULT - SUBJECTIVE AND OBJECTIVE BOX
CHIEF COMPLAINT:Patient is a 62y old  Male who presents with a chief complaint of sob (09 May 2017 15:32)    	pt without change on face mask o2  sob.flores        PAST MEDICAL & SURGICAL HISTORY:  Chronic hypotension  AF (atrial fibrillation)  COPD (chronic obstructive pulmonary disease): 4L home O2  HLD (hyperlipidemia)  DM (diabetes mellitus)  ESRD (end stage renal disease) on dialysis  BPH (benign prostatic hypertrophy)  Myocardial infarction: 10/2011  Chronic renal insufficiency  Gout  Dyslipidemia  Diabetes mellitus  CHF (congestive heart failure)  AICD (automatic cardioverter/defibrillator) present: Biot[employer] - placed 9/11/09  H/O coronary angiogram          REVIEW OF SYSTEMS:  CONSTITUTIONAL: No fever, weight loss, or fatigue  EYES: No eye pain, visual disturbances, or discharge  NECK: No pain or stiffness  RESPIRATORY: No cough, wheezing, chills or hemoptysis; No Shortness of Breath  CARDIOVASCULAR: No chest pain, palpitations, passing out, dizziness, or leg swelling  GASTROINTESTINAL: No abdominal or epigastric pain. No nausea, vomiting, or hematemesis; No diarrhea or constipation. No melena or hematochezia.  GENITOURINARY: No dysuria, frequency, hematuria, or incontinence  NEUROLOGICAL: No headaches, memory loss, loss of strength, numbness, or tremors  SKIN: No itching, burning, rashes, or lesions   LYMPH Nodes: No enlarged glands  ENDOCRINE: No heat or cold intolerance; No hair loss  MUSCULOSKELETAL: No joint pain or swelling; No muscle, back, or extremity pain    Medications:  MEDICATIONS  (STANDING):  finasteride 5milliGRAM(s) Oral daily  amiodarone    Tablet 200milliGRAM(s) Oral daily  atorvastatin 20milliGRAM(s) Oral at bedtime  docusate sodium 100milliGRAM(s) Oral daily  midodrine 30milliGRAM(s) Oral every 8 hours  sevelamer hydrochloride 800milliGRAM(s) Oral three times a day with meals  levothyroxine 75MICROGram(s) Oral daily  insulin lispro (HumaLOG) corrective regimen sliding scale  SubCutaneous three times a day before meals  insulin lispro (HumaLOG) corrective regimen sliding scale  SubCutaneous at bedtime  dextrose 5%. 1000milliLiter(s) IV Continuous <Continuous>  dextrose 50% Injectable 12.5Gram(s) IV Push once  dextrose 50% Injectable 25Gram(s) IV Push once  dextrose 50% Injectable 25Gram(s) IV Push once  DOBUTamine Infusion 7MICROgram(s)/kG/Min IV Continuous <Continuous>  acetaminophen   Tablet. 650milliGRAM(s) Oral once  levETIRAcetam  IVPB 500milliGRAM(s) IV Intermittent daily  levETIRAcetam  IVPB 250milliGRAM(s) IV Intermittent daily  ertapenem  IVPB 500milliGRAM(s) IV Intermittent every 24 hours  buDESOnide 160 MICROgram(s)/formoterol 4.5 MICROgram(s) Inhaler 2Puff(s) Inhalation two times a day  epoetin alba Injectable 25028Ytqx(s) IV Push <User Schedule>  warfarin 2milliGRAM(s) Oral once    MEDICATIONS  (PRN):  dextrose Gel 1Dose(s) Oral once PRN Blood Glucose LESS THAN 70 milliGRAM(s)/deciliter  glucagon  Injectable 1milliGRAM(s) IntraMuscular once PRN Glucose LESS THAN 70 milligrams/deciliter  acetaminophen   Tablet 650milliGRAM(s) Oral every 6 hours PRN For Temp greater than 38 C (100.4 F)  acetaminophen    Suspension. 650milliGRAM(s) Oral every 6 hours PRN Moderate Pain (4 - 6)  artificial tears (preservative free) Ophthalmic Solution 1Drop(s) Both EYES three times a day PRN Dry Eyes  sodium chloride 0.65% Nasal 1Spray(s) Both Nostrils four times a day PRN Nasal Congestion    	    PHYSICAL EXAM:  T(C): 36.3, Max: 36.7 (06-06 @ 20:21)  HR: 80 (65 - 83)  BP: 129/43 (90/53 - 129/43)  RR: 14 (14 - 18)  SpO2: 100% (97% - 100%)  Wt(kg): --  I&O's Summary    I & Os for current day (as of 07 Jun 2017 11:48)  =============================================  IN: 699.6 ml / OUT: 1900 ml / NET: -1200.4 ml      Appearance: Normal	  HEENT:   Normal oral mucosa, PERRL, EOMI	  Lymphatic: No lymphadenopathy  Cardiovascular: Normal S1 S2, No JVD, No murmurs, No edema  Respiratory: Lungs clear to auscultation	  Psychiatry: A & O x 3, Mood & affect appropriate  Gastrointestinal:  Soft, Non-tender, + BS	  Skin: No rashes, No ecchymoses, No cyanosis	  Neurologic: Non-focal  Extremities: Normal range of motion, No clubbing, cyanosis or edema  Vascular: Peripheral pulses palpable 2+ bilaterally    TELEMETRY: 	    ECG:  	  RADIOLOGY:  OTHER: 	  	  LABS:	 	    CARDIAC MARKERS:                                8.2    15.03 )-----------( 183      ( 07 Jun 2017 07:50 )             26.8     06-07    135  |  94<L>  |  17  ----------------------------<  94  3.7   |  28  |  4.92<H>    Ca    9.3      07 Jun 2017 07:50  Mg     1.9     06-07      proBNP:   Lipid Profile:   HgA1c:   TSH:

## 2017-06-07 NOTE — PROGRESS NOTE ADULT - PROBLEM SELECTOR PLAN 1
BiPAP 10/5: 50%  Patient has been doing poorly. I have spoken to his sisters about eh pulmonary diagnosis: He had biopsy done before, and he was tried on steroids fopr sometime but later on reduced to gout treatment level. The family is not aware: but the wife knows and she will bring all the paper work tonight. Given the extensive fibrosis on CT scan chest, I doubt giving high dose steroids would work here in this patient. He may retain salt and water with steroids harming his end stage heart disease. However would wait for his wife to bring the paperwork about his lung biopsy.

## 2017-06-08 LAB
BACTERIA BLD CULT: SIGNIFICANT CHANGE UP
BUN SERPL-MCNC: 24 MG/DL — HIGH (ref 7–23)
CALCIUM SERPL-MCNC: 9.1 MG/DL — SIGNIFICANT CHANGE UP (ref 8.4–10.5)
CHLORIDE SERPL-SCNC: 91 MMOL/L — LOW (ref 98–107)
CO2 SERPL-SCNC: 26 MMOL/L — SIGNIFICANT CHANGE UP (ref 22–31)
CREAT SERPL-MCNC: 5.79 MG/DL — HIGH (ref 0.5–1.3)
GLUCOSE SERPL-MCNC: 113 MG/DL — HIGH (ref 70–99)
HCT VFR BLD CALC: 25.3 % — LOW (ref 39–50)
HGB BLD-MCNC: 7.6 G/DL — LOW (ref 13–17)
INR BLD: 1.63 — HIGH (ref 0.88–1.17)
MAGNESIUM SERPL-MCNC: 2.2 MG/DL — SIGNIFICANT CHANGE UP (ref 1.6–2.6)
MCHC RBC-ENTMCNC: 27.9 PG — SIGNIFICANT CHANGE UP (ref 27–34)
MCHC RBC-ENTMCNC: 30 % — LOW (ref 32–36)
MCV RBC AUTO: 93 FL — SIGNIFICANT CHANGE UP (ref 80–100)
PLATELET # BLD AUTO: 171 K/UL — SIGNIFICANT CHANGE UP (ref 150–400)
PMV BLD: 10.9 FL — SIGNIFICANT CHANGE UP (ref 7–13)
POTASSIUM SERPL-MCNC: 3.9 MMOL/L — SIGNIFICANT CHANGE UP (ref 3.5–5.3)
POTASSIUM SERPL-SCNC: 3.9 MMOL/L — SIGNIFICANT CHANGE UP (ref 3.5–5.3)
PROTHROM AB SERPL-ACNC: 18.4 SEC — HIGH (ref 9.8–13.1)
RBC # BLD: 2.72 M/UL — LOW (ref 4.2–5.8)
RBC # FLD: 21.3 % — HIGH (ref 10.3–14.5)
SODIUM SERPL-SCNC: 131 MMOL/L — LOW (ref 135–145)
WBC # BLD: 12.69 K/UL — HIGH (ref 3.8–10.5)
WBC # FLD AUTO: 12.69 K/UL — HIGH (ref 3.8–10.5)

## 2017-06-08 RX ORDER — WARFARIN SODIUM 2.5 MG/1
2 TABLET ORAL ONCE
Qty: 0 | Refills: 0 | Status: COMPLETED | OUTPATIENT
Start: 2017-06-08 | End: 2017-06-08

## 2017-06-08 RX ADMIN — SEVELAMER CARBONATE 800 MILLIGRAM(S): 2400 POWDER, FOR SUSPENSION ORAL at 12:03

## 2017-06-08 RX ADMIN — FINASTERIDE 5 MILLIGRAM(S): 5 TABLET, FILM COATED ORAL at 12:03

## 2017-06-08 RX ADMIN — MIDODRINE HYDROCHLORIDE 30 MILLIGRAM(S): 2.5 TABLET ORAL at 21:13

## 2017-06-08 RX ADMIN — AMIODARONE HYDROCHLORIDE 200 MILLIGRAM(S): 400 TABLET ORAL at 05:21

## 2017-06-08 RX ADMIN — LEVETIRACETAM 400 MILLIGRAM(S): 250 TABLET, FILM COATED ORAL at 14:48

## 2017-06-08 RX ADMIN — BUDESONIDE AND FORMOTEROL FUMARATE DIHYDRATE 2 PUFF(S): 160; 4.5 AEROSOL RESPIRATORY (INHALATION) at 09:23

## 2017-06-08 RX ADMIN — MIDODRINE HYDROCHLORIDE 30 MILLIGRAM(S): 2.5 TABLET ORAL at 14:48

## 2017-06-08 RX ADMIN — ATORVASTATIN CALCIUM 20 MILLIGRAM(S): 80 TABLET, FILM COATED ORAL at 22:30

## 2017-06-08 RX ADMIN — BUDESONIDE AND FORMOTEROL FUMARATE DIHYDRATE 2 PUFF(S): 160; 4.5 AEROSOL RESPIRATORY (INHALATION) at 22:30

## 2017-06-08 RX ADMIN — WARFARIN SODIUM 2 MILLIGRAM(S): 2.5 TABLET ORAL at 18:00

## 2017-06-08 RX ADMIN — Medication 75 MICROGRAM(S): at 05:21

## 2017-06-08 RX ADMIN — SEVELAMER CARBONATE 800 MILLIGRAM(S): 2400 POWDER, FOR SUSPENSION ORAL at 09:23

## 2017-06-08 RX ADMIN — MIDODRINE HYDROCHLORIDE 30 MILLIGRAM(S): 2.5 TABLET ORAL at 05:21

## 2017-06-08 RX ADMIN — SEVELAMER CARBONATE 800 MILLIGRAM(S): 2400 POWDER, FOR SUSPENSION ORAL at 18:00

## 2017-06-08 NOTE — PROGRESS NOTE ADULT - ASSESSMENT
63 yo Male  with acute on chronic severe systolic CHF (EF 19%), ESRD, DM2, A fib, HCAP  - HCAP - abs over as of 6/7, follow up ID, , BiPAP   ct chest noted / pulm eval noted   - Acute on chronic systolic CHF - continue inotropic support as per Cardio, HD for maximum fluid removal  - ESRD - continue HD as per Renal, continue Midodrine. Renal to address possibility of outpatient HD, as pt refusing any palliative care options.  - A fib - continue Amio, follow INR, hypotension cont midodrine  - DM2 - controlled, continue with ISS  - Prognosis very poor, explained to patient and family, however, patient is refusing palliative input at this time and wants aggressive life support measures.

## 2017-06-08 NOTE — PROGRESS NOTE ADULT - SUBJECTIVE AND OBJECTIVE BOX
Pt seen and examined at bedside  c/o usual dyspnea, weakness  no new events  no fever, chest pain  on Venti mask      Allergies:  No Known Allergies    Hospital Medications:   MEDICATIONS  (STANDING):  finasteride 5milliGRAM(s) Oral daily  amiodarone    Tablet 200milliGRAM(s) Oral daily  atorvastatin 20milliGRAM(s) Oral at bedtime  docusate sodium 100milliGRAM(s) Oral daily  midodrine 30milliGRAM(s) Oral every 8 hours  sevelamer hydrochloride 800milliGRAM(s) Oral three times a day with meals  levothyroxine 75MICROGram(s) Oral daily  insulin lispro (HumaLOG) corrective regimen sliding scale  SubCutaneous three times a day before meals  insulin lispro (HumaLOG) corrective regimen sliding scale  SubCutaneous at bedtime  dextrose 5%. 1000milliLiter(s) IV Continuous <Continuous>  dextrose 50% Injectable 12.5Gram(s) IV Push once  dextrose 50% Injectable 25Gram(s) IV Push once  dextrose 50% Injectable 25Gram(s) IV Push once  DOBUTamine Infusion 7MICROgram(s)/kG/Min IV Continuous <Continuous>  acetaminophen   Tablet. 650milliGRAM(s) Oral once  levETIRAcetam  IVPB 500milliGRAM(s) IV Intermittent daily  levETIRAcetam  IVPB 250milliGRAM(s) IV Intermittent daily  buDESOnide 160 MICROgram(s)/formoterol 4.5 MICROgram(s) Inhaler 2Puff(s) Inhalation two times a day  epoetin alba Injectable 67227Bspi(s) IV Push <User Schedule>         VITALS:  T(F): 97.3, Max: 98.5 (06-07 @ 18:00)  HR: 77  BP: 86/48  RR: 16  SpO2: 100%  Wt(kg): --    I & Os for current day (as of 06-08 @ 11:54)  =============================================  IN: 428.8 ml / OUT: 0 ml / NET: 428.8 ml      PHYSICAL EXAM:  Constitutional: chrincally sick looking, with Venti mask, slight tachypnea  HEENT: anicteric sclera, oropharynx clear, MMM  Neck: No JVD  Respiratory: scattered crepts,no weezing  Cardiovascular: S1, S2,irreg  Gastrointestinal: BS+, soft, NT/ND  Extremities: 1+ leg edema  Neurological: A/O x 3, no focal deficits     : No CVA tenderness. No rosa.   Skin: No rashes  Vascular Access:  permacath    LABS:  06-08    131<L>  |  91<L>  |  24<H>  ----------------------------<  113<H>  3.9   |  26  |  5.79<H>    Ca    9.1      08 Jun 2017 06:00  Mg     2.2     06-08      Creatinine Trend: 5.79 <--, 4.92 <--, 5.85 <--, 6.38 <--, 4.82 <--, 7.27 <--, 6.40 <--                        7.6    12.69 )-----------( 171      ( 08 Jun 2017 06:00 )             25.3     Urine Studies:      RADIOLOGY & ADDITIONAL STUDIES:

## 2017-06-08 NOTE — PROGRESS NOTE ADULT - SUBJECTIVE AND OBJECTIVE BOX
Patient is a 62y old  Male who presents with a chief complaint of sob (09 May 2017 15:32)    Patient has been doing OK       Any change in ROS: SOB on 50%      MEDICATIONS  (STANDING):  finasteride 5milliGRAM(s) Oral daily  amiodarone    Tablet 200milliGRAM(s) Oral daily  atorvastatin 20milliGRAM(s) Oral at bedtime  docusate sodium 100milliGRAM(s) Oral daily  midodrine 30milliGRAM(s) Oral every 8 hours  sevelamer hydrochloride 800milliGRAM(s) Oral three times a day with meals  levothyroxine 75MICROGram(s) Oral daily  insulin lispro (HumaLOG) corrective regimen sliding scale  SubCutaneous three times a day before meals  insulin lispro (HumaLOG) corrective regimen sliding scale  SubCutaneous at bedtime  dextrose 5%. 1000milliLiter(s) IV Continuous <Continuous>  dextrose 50% Injectable 12.5Gram(s) IV Push once  dextrose 50% Injectable 25Gram(s) IV Push once  dextrose 50% Injectable 25Gram(s) IV Push once  DOBUTamine Infusion 7MICROgram(s)/kG/Min IV Continuous <Continuous>  acetaminophen   Tablet. 650milliGRAM(s) Oral once  levETIRAcetam  IVPB 500milliGRAM(s) IV Intermittent daily  levETIRAcetam  IVPB 250milliGRAM(s) IV Intermittent daily  buDESOnide 160 MICROgram(s)/formoterol 4.5 MICROgram(s) Inhaler 2Puff(s) Inhalation two times a day  epoetin alba Injectable 67653Iulw(s) IV Push <User Schedule>  warfarin 2milliGRAM(s) Oral once    MEDICATIONS  (PRN):  dextrose Gel 1Dose(s) Oral once PRN Blood Glucose LESS THAN 70 milliGRAM(s)/deciliter  glucagon  Injectable 1milliGRAM(s) IntraMuscular once PRN Glucose LESS THAN 70 milligrams/deciliter  acetaminophen   Tablet 650milliGRAM(s) Oral every 6 hours PRN For Temp greater than 38 C (100.4 F)  acetaminophen    Suspension. 650milliGRAM(s) Oral every 6 hours PRN Moderate Pain (4 - 6)  artificial tears (preservative free) Ophthalmic Solution 1Drop(s) Both EYES three times a day PRN Dry Eyes  sodium chloride 0.65% Nasal 1Spray(s) Both Nostrils four times a day PRN Nasal Congestion    Vital Signs Last 24 Hrs  T(C): 36.4, Max: 36.9 (06-07 @ 18:00)  T(F): 97.5, Max: 98.5 (06-07 @ 18:00)  HR: 80 (77 - 85)  BP: 102/60 (81/47 - 102/60)  BP(mean): --  RR: 18 (16 - 18)  SpO2: 100% (99% - 100%)    I&O's Summary    I & Os for current day (as of 08 Jun 2017 13:23)  =============================================  IN: 428.8 ml / OUT: 0 ml / NET: 428.8 ml        Physical Exam:   GENERAL: NAD, well-groomed, well-developed  HEENT: BELL/   Atraumatic, Normocephalic  ENMT: No tonsillar erythema, exudates, or enlargement; Moist mucous membranes, Good dentition, No lesions  NECK: Supple, No JVD, Normal thyroid  CHEST/LUNG: Bilateral crackles   CVS: Regular rate and rhythm; No murmurs, rubs, or gallops  GI: : Soft, Nontender, Nondistended; Bowel sounds present  NERVOUS SYSTEM:  Alert & Oriented X3,   EXTREMITIES: some edema  LYMPH: No lymphadenopathy noted  SKIN: No rashes or lesions  ENDOCRINOLOGY: No Thyromegaly  PSYCH: Appropriate    Labs:                              7.6    12.69 )-----------( 171      ( 08 Jun 2017 06:00 )             25.3                         8.2    15.03 )-----------( 183      ( 07 Jun 2017 07:50 )             26.8                         7.8    14.78 )-----------( 168      ( 06 Jun 2017 07:29 )             24.8                         7.8    16.65 )-----------( 168      ( 05 Jun 2017 14:20 )             25.8     06-08    131<L>  |  91<L>  |  24<H>  ----------------------------<  113<H>  3.9   |  26  |  5.79<H>  06-07    135  |  94<L>  |  17  ----------------------------<  94  3.7   |  28  |  4.92<H>  06-06    134<L>  |  96<L>  |  23  ----------------------------<  113<H>  3.9   |  26  |  5.85<H>  06-05    137  |  98  |  23  ----------------------------<  124<H>  4.3   |  25  |  6.38<H>    Ca    9.1      08 Jun 2017 06:00  Ca    9.3      07 Jun 2017 07:50  Mg     2.2     06-08  Mg     1.9     06-07      CAPILLARY BLOOD GLUCOSE  136 (08 Jun 2017 11:49)  150 (08 Jun 2017 08:20)  129 (07 Jun 2017 21:56)  135 (07 Jun 2017 17:13)        PT/INR - ( 08 Jun 2017 06:00 )   PT: 18.4 SEC;   INR: 1.63              Cultures:           Wound culture:                06-03 @ 12:50  Organism --  Culture w/ gram stain --  Specimen Source BLOOD    Wound culture:                06-02 @ 16:29  Organism --  Culture w/ gram stain --  Specimen Source BLOOD      Abscess culture:             06-03 @ 12:50  Organism --  Gram Stain --  Specimen Source BLOOD    Abscess culture:             06-02 @ 16:29  Organism --  Gram Stain --  Specimen Source BLOOD        Tissue culture:           06-03 @ 12:50  Organism --  Gram Stain --  Specimen Source BLOOD    Tissue culture:           06-02 @ 16:29  Organism --  Gram Stain --  Specimen Source BLOOD      Body Fluid Smear & Culture:                        06-03 @ 12:50  AFB Smear  --  Culture Acid Fast Body Fluid w/ Smear  --  Culture Acid Fast Smear Concentrated   --    Culture Results:     --  Specimen Source BLOOD    Body Fluid Smear & Culture:                        06-02 @ 16:29  AFB Smear  --  Culture Acid Fast Body Fluid w/ Smear  --  Culture Acid Fast Smear Concentrated   --    Culture Results:     --  Specimen Source BLOOD            Studies  Chest X-RAY  CT SCAN Chest LUNGS AND LARGE AIRWAYS: Small amount of secretions in the trachea.   Diffuse bilateral groundglass opacities with traction bronchiectasis   throughout the lungs with regions of polygonal sparing bilateral lower   andleft upper lobes. There is honeycombing scattered throughout the   lungs. Interlobular septal thickening is noted in the bilateral upper   lobes. Patchy tree-in-bud opacities are noted in the bilateral lower   lobes. Scattered small calcified granulomas.  PLEURA: Trace bilateral pleural effusions.  VESSELS: Atherosclerotic calcifications of the aorta and coronary   arteries.  HEART: Cardiomegaly. No pericardial effusion.  MEDIASTINUM AND ASH: No lymphadenopathy.  CHEST WALL AND LOWER NECK: Left chest wall AICD. Generalized anasarca.  UPPER ABDOMEN: Hyperdense material within the gallbladder may reflect   vicarious excretion of iodinated contrast from recent procedure.  BONES: Degenerative changes of the spine.    IMPRESSION:    Extensive pulmonary fibrosis.    Evidence of superimposed mild small airway disease.  Venous Dopplers: LE:   CT Abdomen  Others

## 2017-06-08 NOTE — PROGRESS NOTE ADULT - PROBLEM SELECTOR PLAN 1
HD today, attempt yo remove 2 kg. low bp on large dose Midodrine  c/w midodrine. Very poor prognosis.   c/w renal diet, fluid restriction.

## 2017-06-08 NOTE — PROGRESS NOTE ADULT - SUBJECTIVE AND OBJECTIVE BOX
CHIEF COMPLAINT:Patient is a 62y old  Male who presents with a chief complaint of sob (09 May 2017 15:32)    	  pt without new changes  chronic flores/ sob on f/m o2      PAST MEDICAL & SURGICAL HISTORY:  Chronic hypotension  AF (atrial fibrillation)  COPD (chronic obstructive pulmonary disease): 4L home O2  HLD (hyperlipidemia)  DM (diabetes mellitus)  ESRD (end stage renal disease) on dialysis  BPH (benign prostatic hypertrophy)  Myocardial infarction: 10/2011  Chronic renal insufficiency  Gout  Dyslipidemia  Diabetes mellitus  CHF (congestive heart failure)  AICD (automatic cardioverter/defibrillator) present: Biotronic - placed 9/11/09  H/O coronary angiogram          REVIEW OF SYSTEMS:  CONSTITUTIONAL: No fever, weight loss, or fatigue  EYES: No eye pain, visual disturbances, or discharge  NECK: No pain or stiffness  RESPIRATORY:sob   CARDIOVASCULAR: No chest pain, palpitations, passing out, dizziness, or leg swelling  GASTROINTESTINAL: No abdominal or epigastric pain. No nausea, vomiting, or hematemesis; No diarrhea or constipation. No melena or hematochezia.  GENITOURINARY: No dysuria, frequency, hematuria, or incontinence  NEUROLOGICAL: No headaches, memory loss, loss of strength, numbness, or tremors  SKIN: No itching, burning, rashes, or lesions   LYMPH Nodes: No enlarged glands  ENDOCRINE: No heat or cold intolerance; No hair loss  MUSCULOSKELETAL: No joint pain or swelling; No muscle, back, or extremity pain    Medications:  MEDICATIONS  (STANDING):  finasteride 5milliGRAM(s) Oral daily  amiodarone    Tablet 200milliGRAM(s) Oral daily  atorvastatin 20milliGRAM(s) Oral at bedtime  docusate sodium 100milliGRAM(s) Oral daily  midodrine 30milliGRAM(s) Oral every 8 hours  sevelamer hydrochloride 800milliGRAM(s) Oral three times a day with meals  levothyroxine 75MICROGram(s) Oral daily  insulin lispro (HumaLOG) corrective regimen sliding scale  SubCutaneous three times a day before meals  insulin lispro (HumaLOG) corrective regimen sliding scale  SubCutaneous at bedtime  dextrose 5%. 1000milliLiter(s) IV Continuous <Continuous>  dextrose 50% Injectable 12.5Gram(s) IV Push once  dextrose 50% Injectable 25Gram(s) IV Push once  dextrose 50% Injectable 25Gram(s) IV Push once  DOBUTamine Infusion 7MICROgram(s)/kG/Min IV Continuous <Continuous>  acetaminophen   Tablet. 650milliGRAM(s) Oral once  levETIRAcetam  IVPB 500milliGRAM(s) IV Intermittent daily  levETIRAcetam  IVPB 250milliGRAM(s) IV Intermittent daily  buDESOnide 160 MICROgram(s)/formoterol 4.5 MICROgram(s) Inhaler 2Puff(s) Inhalation two times a day  epoetin alba Injectable [id number](s) IV Push <User Schedule>    MEDICATIONS  (PRN):  dextrose Gel 1Dose(s) Oral once PRN Blood Glucose LESS THAN 70 milliGRAM(s)/deciliter  glucagon  Injectable 1milliGRAM(s) IntraMuscular once PRN Glucose LESS THAN 70 milligrams/deciliter  acetaminophen   Tablet 650milliGRAM(s) Oral every 6 hours PRN For Temp greater than 38 C (100.4 F)  acetaminophen    Suspension. 650milliGRAM(s) Oral every 6 hours PRN Moderate Pain (4 - 6)  artificial tears (preservative free) Ophthalmic Solution 1Drop(s) Both EYES three times a day PRN Dry Eyes  sodium chloride 0.65% Nasal 1Spray(s) Both Nostrils four times a day PRN Nasal Congestion    	    PHYSICAL EXAM:  T(C): 36.3, Max: 36.9 (06-07 @ 18:00)  HR: 77 (77 - 82)  BP: 86/48 (81/47 - 88/45)  RR: 16 (16 - 18)  SpO2: 100% (99% - 100%)  Wt(kg): --  I&O's Summary    I & Os for current day (as of 08 Jun 2017 11:33)  =============================================  IN: 428.8 ml / OUT: 0 ml / NET: 428.8 ml      Appearance: Normal	  HEENT:   Normal oral mucosa, PERRL, EOMI	  Lymphatic: No lymphadenopathy  Cardiovascular: Normal S1 S2, No JVD, No murmurs, No edema  Respiratory:dec bs b/l  Psychiatry: A & O x 3, Mood & affect appropriate  Gastrointestinal:  Soft, Non-tender, + BS	  Skin: No rashes, No ecchymoses, No cyanosis	  Neurologic: Non-focal from   Extremities: Normal range of motion, No clubbing, cyanosis chronic edema   Vascular: Peripheral pulses palpable 2+ bilaterally    TELEMETRY: 	    ECG:  	  RADIOLOGY:  OTHER: 	  	  LABS:	 	    CARDIAC MARKERS:                                7.6    12.69 )-----------( 171      ( 08 Jun 2017 06:00 )             25.3     06-08    131<L>  |  91<L>  |  24<H>  ----------------------------<  113<H>  3.9   |  26  |  5.79<H>    Ca    9.1      08 Jun 2017 06:00  Mg     2.2     06-08      proBNP:   Lipid Profile:   HgA1c:   TSH:

## 2017-06-08 NOTE — PROGRESS NOTE ADULT - PROBLEM SELECTOR PLAN 1
BiPAP 10/5: 50% prn   Had a detailed discussion with the patients wife: She understands the current critical condition of her . He had biopsy done at  [facility] following which he was started on steroids and later on switched to cellcept. CELLCEPT WAS STOPPED 4 MONTHS AGO SECONDARY TO ITS COMPLICATIONS ( SHE DOES NOT KNOW WHAT). She was never told he has sarcoidosis..  she was just told he has interstitial lung disease. She knows he has end stage lung disease and there is no treatment for it. GIVEN THE POOR CLINCIAL CONDITION OF THE PATIENT, ESPECIALLY END STAGE HEART FAILURE: CARDIOMYOPATHY: AND HIM BEING ON DOBUTAMINE DRIP, and in view of above pulmonary history, there probably will be no benefit of adding steroids or Cellcept at this time. The wife understands it: Continue supportive treatment.

## 2017-06-09 LAB
BUN SERPL-MCNC: 13 MG/DL — SIGNIFICANT CHANGE UP (ref 7–23)
CALCIUM SERPL-MCNC: 8.7 MG/DL — SIGNIFICANT CHANGE UP (ref 8.4–10.5)
CHLORIDE SERPL-SCNC: 99 MMOL/L — SIGNIFICANT CHANGE UP (ref 98–107)
CO2 SERPL-SCNC: 28 MMOL/L — SIGNIFICANT CHANGE UP (ref 22–31)
CREAT SERPL-MCNC: 3.6 MG/DL — HIGH (ref 0.5–1.3)
GLUCOSE SERPL-MCNC: 147 MG/DL — HIGH (ref 70–99)
HCT VFR BLD CALC: 26.9 % — LOW (ref 39–50)
HGB BLD-MCNC: 8.1 G/DL — LOW (ref 13–17)
INR BLD: 1.77 — HIGH (ref 0.88–1.17)
MAGNESIUM SERPL-MCNC: 2 MG/DL — SIGNIFICANT CHANGE UP (ref 1.6–2.6)
MCHC RBC-ENTMCNC: 28.8 PG — SIGNIFICANT CHANGE UP (ref 27–34)
MCHC RBC-ENTMCNC: 30.1 % — LOW (ref 32–36)
MCV RBC AUTO: 95.7 FL — SIGNIFICANT CHANGE UP (ref 80–100)
PLATELET # BLD AUTO: 186 K/UL — SIGNIFICANT CHANGE UP (ref 150–400)
PMV BLD: 11.9 FL — SIGNIFICANT CHANGE UP (ref 7–13)
POTASSIUM SERPL-MCNC: 4.1 MMOL/L — SIGNIFICANT CHANGE UP (ref 3.5–5.3)
POTASSIUM SERPL-SCNC: 4.1 MMOL/L — SIGNIFICANT CHANGE UP (ref 3.5–5.3)
PROTHROM AB SERPL-ACNC: 20 SEC — HIGH (ref 9.8–13.1)
RBC # BLD: 2.81 M/UL — LOW (ref 4.2–5.8)
RBC # FLD: 21.9 % — HIGH (ref 10.3–14.5)
SODIUM SERPL-SCNC: 140 MMOL/L — SIGNIFICANT CHANGE UP (ref 135–145)
WBC # BLD: 11.33 K/UL — HIGH (ref 3.8–10.5)
WBC # FLD AUTO: 11.33 K/UL — HIGH (ref 3.8–10.5)

## 2017-06-09 RX ORDER — WARFARIN SODIUM 2.5 MG/1
3 TABLET ORAL ONCE
Qty: 0 | Refills: 0 | Status: COMPLETED | OUTPATIENT
Start: 2017-06-09 | End: 2017-06-09

## 2017-06-09 RX ORDER — HYDROMORPHONE HYDROCHLORIDE 2 MG/ML
0.5 INJECTION INTRAMUSCULAR; INTRAVENOUS; SUBCUTANEOUS ONCE
Qty: 0 | Refills: 0 | Status: DISCONTINUED | OUTPATIENT
Start: 2017-06-09 | End: 2017-06-09

## 2017-06-09 RX ADMIN — Medication 75 MICROGRAM(S): at 06:06

## 2017-06-09 RX ADMIN — LEVETIRACETAM 400 MILLIGRAM(S): 250 TABLET, FILM COATED ORAL at 02:00

## 2017-06-09 RX ADMIN — Medication 1 DROP(S): at 21:07

## 2017-06-09 RX ADMIN — SEVELAMER CARBONATE 800 MILLIGRAM(S): 2400 POWDER, FOR SUSPENSION ORAL at 11:48

## 2017-06-09 RX ADMIN — MIDODRINE HYDROCHLORIDE 30 MILLIGRAM(S): 2.5 TABLET ORAL at 21:07

## 2017-06-09 RX ADMIN — AMIODARONE HYDROCHLORIDE 200 MILLIGRAM(S): 400 TABLET ORAL at 06:06

## 2017-06-09 RX ADMIN — LEVETIRACETAM 400 MILLIGRAM(S): 250 TABLET, FILM COATED ORAL at 11:49

## 2017-06-09 RX ADMIN — HYDROMORPHONE HYDROCHLORIDE 0.5 MILLIGRAM(S): 2 INJECTION INTRAMUSCULAR; INTRAVENOUS; SUBCUTANEOUS at 09:08

## 2017-06-09 RX ADMIN — SEVELAMER CARBONATE 800 MILLIGRAM(S): 2400 POWDER, FOR SUSPENSION ORAL at 17:50

## 2017-06-09 RX ADMIN — SEVELAMER CARBONATE 800 MILLIGRAM(S): 2400 POWDER, FOR SUSPENSION ORAL at 09:11

## 2017-06-09 RX ADMIN — FINASTERIDE 5 MILLIGRAM(S): 5 TABLET, FILM COATED ORAL at 11:48

## 2017-06-09 RX ADMIN — MIDODRINE HYDROCHLORIDE 30 MILLIGRAM(S): 2.5 TABLET ORAL at 13:02

## 2017-06-09 RX ADMIN — MIDODRINE HYDROCHLORIDE 30 MILLIGRAM(S): 2.5 TABLET ORAL at 06:06

## 2017-06-09 RX ADMIN — WARFARIN SODIUM 3 MILLIGRAM(S): 2.5 TABLET ORAL at 17:50

## 2017-06-09 RX ADMIN — BUDESONIDE AND FORMOTEROL FUMARATE DIHYDRATE 2 PUFF(S): 160; 4.5 AEROSOL RESPIRATORY (INHALATION) at 21:07

## 2017-06-09 RX ADMIN — ATORVASTATIN CALCIUM 20 MILLIGRAM(S): 80 TABLET, FILM COATED ORAL at 21:07

## 2017-06-09 RX ADMIN — BUDESONIDE AND FORMOTEROL FUMARATE DIHYDRATE 2 PUFF(S): 160; 4.5 AEROSOL RESPIRATORY (INHALATION) at 09:11

## 2017-06-09 RX ADMIN — HYDROMORPHONE HYDROCHLORIDE 0.5 MILLIGRAM(S): 2 INJECTION INTRAMUSCULAR; INTRAVENOUS; SUBCUTANEOUS at 09:26

## 2017-06-09 NOTE — PROGRESS NOTE ADULT - SUBJECTIVE AND OBJECTIVE BOX
Pt seen and examined at bedside  No acute events overnight. On BIPAP overnight.   [place] at baseline. Denies CP or N/V.     Allergies:  No Known Allergies    Hospital Medications:   MEDICATIONS  (STANDING):  finasteride 5milliGRAM(s) Oral daily  amiodarone    Tablet 200milliGRAM(s) Oral daily  atorvastatin 20milliGRAM(s) Oral at bedtime  docusate sodium 100milliGRAM(s) Oral daily  midodrine 30milliGRAM(s) Oral every 8 hours  sevelamer hydrochloride 800milliGRAM(s) Oral three times a day with meals  levothyroxine 75MICROGram(s) Oral daily  insulin lispro (HumaLOG) corrective regimen sliding scale  SubCutaneous three times a day before meals  insulin lispro (HumaLOG) corrective regimen sliding scale  SubCutaneous at bedtime  dextrose 5%. 1000milliLiter(s) IV Continuous <Continuous>  dextrose 50% Injectable 12.5Gram(s) IV Push once  dextrose 50% Injectable 25Gram(s) IV Push once  dextrose 50% Injectable 25Gram(s) IV Push once  DOBUTamine Infusion 7MICROgram(s)/kG/Min IV Continuous <Continuous>  acetaminophen   Tablet. 650milliGRAM(s) Oral once  levETIRAcetam  IVPB 500milliGRAM(s) IV Intermittent daily  levETIRAcetam  IVPB 250milliGRAM(s) IV Intermittent daily  buDESOnide 160 MICROgram(s)/formoterol 4.5 MICROgram(s) Inhaler 2Puff(s) Inhalation two times a day  epoetin alba Injectable [id number](s) IV Push <User Schedule    VITALS:  T(F): 98.3, Max: 98.3 (06-09 @ 09:00)  HR: 83  BP: 86/47  RR: 17  SpO2: 95%  Wt(kg): --    I & Os for current day (as of 06-09 @ 13:06)  =============================================  IN: 400 ml / OUT: 2700 ml / NET: -2300 ml      PHYSICAL EXAM:  Constitutional: NAD  HEENT: anicteric sclera, oropharynx clear, MMM  Neck: No JVD  Respiratory: Mild ronchi at bases.   Cardiovascular: S1, S2, RRR  Gastrointestinal: BS+, soft, NT/ND  Extremities: No cyanosis or clubbing. Trace peripheral edema  Neurological: A/O x 3, no focal deficits  Psychiatric: Normal mood, normal affect  : No CVA tenderness. No rosa.   Skin: No rashes  Vascular Access: RIJ Tunneled cath.     LABS:  06-09    140  |  99  |  13  ----------------------------<  147<H>  4.1   |  28  |  3.60<H>    Ca    8.7      09 Jun 2017 06:50  Mg     2.0     06-09      Creatinine Trend: 3.60 <--, 5.79 <--, 4.92 <--, 5.85 <--, 6.38 <--, 4.82 <--, 7.27 <--                        8.1    11.33 )-----------( 186      ( 09 Jun 2017 05:50 )             26.9

## 2017-06-09 NOTE — PROGRESS NOTE ADULT - ASSESSMENT
61 yo Male  with acute on chronic severe systolic CHF (EF 19%), ESRD, DM2, A fib, HCAP  - HCAP - abs , follow up ID, , BiPAP   ct chest noted / pulm eval noted /? steroids likely of no value  - Acute on chronic systolic CHF - continue inotropic support as per Cardio, HD for maximum fluid removal  - ESRD - continue HD as per Renal, continue Midodrine. Renal to address possibility of outpatient HD, as pt refusing any palliative care options.  - A fib - continue Amio, follow INR,   - DM2 - controlled, continue with ISS  - Prognosis very poor, explained to patient and family, however, patient is refusing palliative input at this time and wants aggressive life support measures.

## 2017-06-09 NOTE — PROGRESS NOTE ADULT - SUBJECTIVE AND OBJECTIVE BOX
Patient is a 62y old  Male who presents with a chief complaint of sob (09 May 2017 15:32)    pt sleeping comfortably sister at bedside       Any change in ROS:     MEDICATIONS  (STANDING):  finasteride 5milliGRAM(s) Oral daily  amiodarone    Tablet 200milliGRAM(s) Oral daily  atorvastatin 20milliGRAM(s) Oral at bedtime  docusate sodium 100milliGRAM(s) Oral daily  midodrine 30milliGRAM(s) Oral every 8 hours  sevelamer hydrochloride 800milliGRAM(s) Oral three times a day with meals  levothyroxine 75MICROGram(s) Oral daily  insulin lispro (HumaLOG) corrective regimen sliding scale  SubCutaneous three times a day before meals  insulin lispro (HumaLOG) corrective regimen sliding scale  SubCutaneous at bedtime  dextrose 5%. 1000milliLiter(s) IV Continuous <Continuous>  dextrose 50% Injectable 12.5Gram(s) IV Push once  dextrose 50% Injectable 25Gram(s) IV Push once  dextrose 50% Injectable 25Gram(s) IV Push once  DOBUTamine Infusion 7MICROgram(s)/kG/Min IV Continuous <Continuous>  acetaminophen   Tablet. 650milliGRAM(s) Oral once  levETIRAcetam  IVPB 500milliGRAM(s) IV Intermittent daily  levETIRAcetam  IVPB 250milliGRAM(s) IV Intermittent daily  buDESOnide 160 MICROgram(s)/formoterol 4.5 MICROgram(s) Inhaler 2Puff(s) Inhalation two times a day  epoetin alba Injectable [id number](s) IV Push <User Schedule>    MEDICATIONS  (PRN):  dextrose Gel 1Dose(s) Oral once PRN Blood Glucose LESS THAN 70 milliGRAM(s)/deciliter  glucagon  Injectable 1milliGRAM(s) IntraMuscular once PRN Glucose LESS THAN 70 milligrams/deciliter  acetaminophen   Tablet 650milliGRAM(s) Oral every 6 hours PRN For Temp greater than 38 C (100.4 F)  acetaminophen    Suspension. 650milliGRAM(s) Oral every 6 hours PRN Moderate Pain (4 - 6)  artificial tears (preservative free) Ophthalmic Solution 1Drop(s) Both EYES three times a day PRN Dry Eyes  sodium chloride 0.65% Nasal 1Spray(s) Both Nostrils four times a day PRN Nasal Congestion    Vital Signs Last 24 Hrs  T(C): 36.8, Max: 36.8 (06-09 @ 09:00)  T(F): 98.3, Max: 98.3 (06-09 @ 09:00)  HR: 83 (72 - 83)  BP: 86/47 (76/45 - 111/53)  BP(mean): --  RR: 17 (12 - 18)  SpO2: 95% (95% - 100%)    I&O's Summary    I & Os for current day (as of 09 Jun 2017 14:33)  =============================================  IN: 400 ml / OUT: 2700 ml / NET: -2300 ml        Physical Exam:   GENERAL: NAD, well-groomed, well-developed  HEENT: BELL/   Atraumatic, Normocephalic  ENMT: No tonsillar erythema, exudates, or enlargement; Moist mucous membranes, Good dentition, No lesions  NECK:+ JVD  CHEST/LUNG: Crackles bilaterally with decreased air entry bilateral bases   CVS: Regular rate and rhythm; No murmurs, rubs, or gallops  GI: : Soft, Nontender, Nondistended; Bowel sounds present  NERVOUS SYSTEM:  Alert & Oriented X3, Good concentration; Motor Strength 5/5 B/L upper and lower extremities; DTRs 2+ intact and symmetric  EXTREMITIES:  2+ Peripheral Pulses, No clubbing, cyanosis, or edema  LYMPH: No lymphadenopathy noted  SKIN: No rashes or lesions  ENDOCRINOLOGY: No Thyromegaly  PSYCH: Appropriate/    Labs:                              8.1    11.33 )-----------( 186      ( 09 Jun 2017 05:50 )             26.9                         7.6    12.69 )-----------( 171      ( 08 Jun 2017 06:00 )             25.3                         8.2    15.03 )-----------( 183      ( 07 Jun 2017 07:50 )             26.8                         7.8    14.78 )-----------( 168      ( 06 Jun 2017 07:29 )             24.8     06-09    140  |  99  |  13  ----------------------------<  147<H>  4.1   |  28  |  3.60<H>  06-08    131<L>  |  91<L>  |  24<H>  ----------------------------<  113<H>  3.9   |  26  |  5.79<H>  06-07    135  |  94<L>  |  17  ----------------------------<  94  3.7   |  28  |  4.92<H>  06-06    134<L>  |  96<L>  |  23  ----------------------------<  113<H>  3.9   |  26  |  5.85<H>    Ca    8.7      09 Jun 2017 06:50  Ca    9.1      08 Jun 2017 06:00  Mg     2.0     06-09  Mg     2.2     06-08      CAPILLARY BLOOD GLUCOSE  131 (09 Jun 2017 11:42)  141 (09 Jun 2017 08:38)  151 (08 Jun 2017 21:52)  141 (08 Jun 2017 17:05)        PT/INR - ( 09 Jun 2017 05:50 )   PT: 20.0 SEC;   INR: 1.77              Cultures:           Wound culture:                06-03 @ 12:50  Organism --  Culture w/ gram stain --  Specimen Source BLOOD    Wound culture:                06-02 @ 16:29  Organism --  Culture w/ gram stain --  Specimen Source BLOOD      Abscess culture:             06-03 @ 12:50  Organism --  Gram Stain --  Specimen Source BLOOD    Abscess culture:             06-02 @ 16:29  Organism --  Gram Stain --  Specimen Source BLOOD        Tissue culture:           06-03 @ 12:50  Organism --  Gram Stain --  Specimen Source BLOOD    Tissue culture:           06-02 @ 16:29  Organism --  Gram Stain --  Specimen Source BLOOD      Body Fluid Smear & Culture:                        06-03 @ 12:50  AFB Smear  --  Culture Acid Fast Body Fluid w/ Smear  --  Culture Acid Fast Smear Concentrated   --    Culture Results:     --  Specimen Source BLOOD    Body Fluid Smear & Culture:                        06-02 @ 16:29  AFB Smear  --  Culture Acid Fast Body Fluid w/ Smear  --  Culture Acid Fast Smear Concentrated   --    Culture Results:     --  Specimen Source BLOOD            Studies  Chest X-RAY  CT SCAN Chest IMPRESSION:    Extensive pulmonary fibrosis.    Evidence of superimposed mild small airway disease.  Venous Dopplers: LE:   CT Abdomen  Others

## 2017-06-09 NOTE — PROGRESS NOTE ADULT - PROBLEM SELECTOR PLAN 1
BiPAP 10/5: 50% prn   Had a detailed discussion with the patients wife: She understands the current critical condition of her . He had biopsy done at  [facility] following which he was started on steroids and later on switched to cellcept. CELLCEPT WAS STOPPED 4 MONTHS AGO SECONDARY TO ITS COMPLICATIONS ( SHE DOES NOT KNOW WHAT). She was never told he has sarcoidosis..  she was just told he has interstitial lung disease. She knows he has end stage lung disease and there is no treatment for it. GIVEN THE POOR CLINICAL CONDITION OF THE PATIENT, ESPECIALLY END STAGE HEART FAILURE: CARDIOMYOPATHY: AND HIM BEING ON DOBUTAMINE DRIP, and in view of above pulmonary history, there probably will be no benefit of adding steroids or Cellcept at this time. The wife understands it: Continue supportive treatment.

## 2017-06-09 NOTE — PROGRESS NOTE ADULT - ASSESSMENT
Patient is a 62y Male with severe heart failure, ESRD on HD a/w syncope and volume overload. on Dobutamine drip. Renal following for HD/PUF, for fluid Mx. w/pulm edema, resp failure on BiPAP, s/p fever, seizure, hypotension, and pulm fibrosis

## 2017-06-09 NOTE — PROGRESS NOTE ADULT - PROBLEM SELECTOR PLAN 1
HD yesterday. UF 2.3 kg achieved. Stable respiratory status.   Electrolytes acceptable. Plan for HD tomorrow.   c/w midodrine and dobutamine. Very poor prognosis.   c/w renal diet, fluid restriction.

## 2017-06-09 NOTE — PROGRESS NOTE ADULT - PROBLEM SELECTOR PROBLEM 7
ESRD (end stage renal disease) on dialysis

## 2017-06-09 NOTE — PROGRESS NOTE ADULT - SUBJECTIVE AND OBJECTIVE BOX
CHIEF COMPLAINT:Patient is a 62y old  Male who presents with a chief complaint of sob (09 May 2017 15:32)    pt breathing same on f/m o2    PAST MEDICAL & SURGICAL HISTORY:  Chronic hypotension  AF (atrial fibrillation)  COPD (chronic obstructive pulmonary disease): 4L home O2  HLD (hyperlipidemia)  DM (diabetes mellitus)  ESRD (end stage renal disease) on dialysis  BPH (benign prostatic hypertrophy)  Myocardial infarction: 10/2011  Chronic renal insufficiency  Gout  Dyslipidemia  Diabetes mellitus  CHF (congestive heart failure)  AICD (automatic cardioverter/defibrillator) present: Biotronic - placed 9/11/09  H/O coronary angiogram          REVIEW OF SYSTEMS:  CONSTITUTIONAL: No fever, weight loss, or fatigue  EYES: No eye pain, visual disturbances, or discharge  NECK: No pain or stiffness  RESPIRATORY:sob/ flores without change  CARDIOVASCULAR: No chest pain, palpitations, passing out, dizziness, or leg swelling  GASTROINTESTINAL: No abdominal or epigastric pain. No nausea, vomiting, or hematemesis; No diarrhea or constipation. No melena or hematochezia.  GENITOURINARY: No dysuria, frequency, hematuria, or incontinence  NEUROLOGICAL: No headaches, memory loss, loss of strength, numbness, or tremors  SKIN: No itching, burning, rashes, or lesions   LYMPH Nodes: No enlarged glands  ENDOCRINE: No heat or cold intolerance; No hair loss  MUSCULOSKELETAL: No joint pain or swelling; No muscle, back, or extremity pain    Medications:  MEDICATIONS  (STANDING):  finasteride 5milliGRAM(s) Oral daily  amiodarone    Tablet 200milliGRAM(s) Oral daily  atorvastatin 20milliGRAM(s) Oral at bedtime  docusate sodium 100milliGRAM(s) Oral daily  midodrine 30milliGRAM(s) Oral every 8 hours  sevelamer hydrochloride 800milliGRAM(s) Oral three times a day with meals  levothyroxine 75MICROGram(s) Oral daily  insulin lispro (HumaLOG) corrective regimen sliding scale  SubCutaneous three times a day before meals  insulin lispro (HumaLOG) corrective regimen sliding scale  SubCutaneous at bedtime  dextrose 5%. 1000milliLiter(s) IV Continuous <Continuous>  dextrose 50% Injectable 12.5Gram(s) IV Push once  dextrose 50% Injectable 25Gram(s) IV Push once  dextrose 50% Injectable 25Gram(s) IV Push once  DOBUTamine Infusion 7MICROgram(s)/kG/Min IV Continuous <Continuous>  acetaminophen   Tablet. 650milliGRAM(s) Oral once  levETIRAcetam  IVPB 500milliGRAM(s) IV Intermittent daily  levETIRAcetam  IVPB 250milliGRAM(s) IV Intermittent daily  buDESOnide 160 MICROgram(s)/formoterol 4.5 MICROgram(s) Inhaler 2Puff(s) Inhalation two times a day  epoetin alba Injectable [id number](s) IV Push <User Schedule>    MEDICATIONS  (PRN):  dextrose Gel 1Dose(s) Oral once PRN Blood Glucose LESS THAN 70 milliGRAM(s)/deciliter  glucagon  Injectable 1milliGRAM(s) IntraMuscular once PRN Glucose LESS THAN 70 milligrams/deciliter  acetaminophen   Tablet 650milliGRAM(s) Oral every 6 hours PRN For Temp greater than 38 C (100.4 F)  acetaminophen    Suspension. 650milliGRAM(s) Oral every 6 hours PRN Moderate Pain (4 - 6)  artificial tears (preservative free) Ophthalmic Solution 1Drop(s) Both EYES three times a day PRN Dry Eyes  sodium chloride 0.65% Nasal 1Spray(s) Both Nostrils four times a day PRN Nasal Congestion    	    PHYSICAL EXAM:  T(C): 36.8, Max: 36.8 (06-09 @ 09:00)  HR: 81 (72 - 82)  BP: 94/54 (76/45 - 111/53)  RR: 18 (12 - 18)  SpO2: 100% (96% - 100%)  Wt(kg): --  I&O's Summary    I & Os for current day (as of 09 Jun 2017 11:54)  =============================================  IN: 400 ml / OUT: 2700 ml / NET: -2300 ml      Appearance: Normal	  HEENT:   Normal oral mucosa, PERRL, EOMI	  Lymphatic: No lymphadenopathy  Cardiovascular: Normal S1 S2, No JVD, No murmurs, No edema  Respiratory: dec bs b/l  Psychiatry: A & O x 3, Mood & affect appropriate  Gastrointestinal:  Soft, Non-tender, + BS	  Skin: No rashes, No ecchymoses, No cyanosis	  Neurologic: Non-focal  Extremities: Normal range of motion, chronic lext edema  Vascular: Peripheral pulses palpable 2+ bilaterally    TELEMETRY: 	    ECG:  	  RADIOLOGY:  OTHER: 	  	  LABS:	 	    CARDIAC MARKERS:                                8.1    11.33 )-----------( 186      ( 09 Jun 2017 05:50 )             26.9     06-09    140  |  99  |  13  ----------------------------<  147<H>  4.1   |  28  |  3.60<H>    Ca    8.7      09 Jun 2017 06:50  Mg     2.0     06-09      proBNP:   Lipid Profile:   HgA1c:   TSH:

## 2017-06-10 LAB
BUN SERPL-MCNC: 23 MG/DL — SIGNIFICANT CHANGE UP (ref 7–23)
CALCIUM SERPL-MCNC: 8.9 MG/DL — SIGNIFICANT CHANGE UP (ref 8.4–10.5)
CHLORIDE SERPL-SCNC: 97 MMOL/L — LOW (ref 98–107)
CO2 SERPL-SCNC: 28 MMOL/L — SIGNIFICANT CHANGE UP (ref 22–31)
CREAT SERPL-MCNC: 5.06 MG/DL — HIGH (ref 0.5–1.3)
GLUCOSE SERPL-MCNC: 128 MG/DL — HIGH (ref 70–99)
HCT VFR BLD CALC: 25.4 % — LOW (ref 39–50)
HGB BLD-MCNC: 7.6 G/DL — LOW (ref 13–17)
INR BLD: 1.9 — HIGH (ref 0.88–1.17)
MAGNESIUM SERPL-MCNC: 2.1 MG/DL — SIGNIFICANT CHANGE UP (ref 1.6–2.6)
MCHC RBC-ENTMCNC: 28.5 PG — SIGNIFICANT CHANGE UP (ref 27–34)
MCHC RBC-ENTMCNC: 29.9 % — LOW (ref 32–36)
MCV RBC AUTO: 95.1 FL — SIGNIFICANT CHANGE UP (ref 80–100)
PLATELET # BLD AUTO: 184 K/UL — SIGNIFICANT CHANGE UP (ref 150–400)
PMV BLD: 11.1 FL — SIGNIFICANT CHANGE UP (ref 7–13)
POTASSIUM SERPL-MCNC: 3.9 MMOL/L — SIGNIFICANT CHANGE UP (ref 3.5–5.3)
POTASSIUM SERPL-SCNC: 3.9 MMOL/L — SIGNIFICANT CHANGE UP (ref 3.5–5.3)
PROTHROM AB SERPL-ACNC: 21.6 SEC — HIGH (ref 9.8–13.1)
RBC # BLD: 2.67 M/UL — LOW (ref 4.2–5.8)
RBC # FLD: 23.1 % — HIGH (ref 10.3–14.5)
SODIUM SERPL-SCNC: 135 MMOL/L — SIGNIFICANT CHANGE UP (ref 135–145)
WBC # BLD: 11.62 K/UL — HIGH (ref 3.8–10.5)
WBC # FLD AUTO: 11.62 K/UL — HIGH (ref 3.8–10.5)

## 2017-06-10 RX ORDER — WARFARIN SODIUM 2.5 MG/1
3 TABLET ORAL ONCE
Qty: 0 | Refills: 0 | Status: COMPLETED | OUTPATIENT
Start: 2017-06-10 | End: 2017-06-10

## 2017-06-10 RX ADMIN — Medication 75 MICROGRAM(S): at 05:22

## 2017-06-10 RX ADMIN — AMIODARONE HYDROCHLORIDE 200 MILLIGRAM(S): 400 TABLET ORAL at 05:22

## 2017-06-10 RX ADMIN — LEVETIRACETAM 400 MILLIGRAM(S): 250 TABLET, FILM COATED ORAL at 17:30

## 2017-06-10 RX ADMIN — Medication: at 11:44

## 2017-06-10 RX ADMIN — SEVELAMER CARBONATE 800 MILLIGRAM(S): 2400 POWDER, FOR SUSPENSION ORAL at 11:43

## 2017-06-10 RX ADMIN — LEVETIRACETAM 400 MILLIGRAM(S): 250 TABLET, FILM COATED ORAL at 11:43

## 2017-06-10 RX ADMIN — FINASTERIDE 5 MILLIGRAM(S): 5 TABLET, FILM COATED ORAL at 11:43

## 2017-06-10 RX ADMIN — MIDODRINE HYDROCHLORIDE 30 MILLIGRAM(S): 2.5 TABLET ORAL at 13:57

## 2017-06-10 RX ADMIN — ERYTHROPOIETIN 20000 UNIT(S): 10000 INJECTION, SOLUTION INTRAVENOUS; SUBCUTANEOUS at 14:00

## 2017-06-10 RX ADMIN — BUDESONIDE AND FORMOTEROL FUMARATE DIHYDRATE 2 PUFF(S): 160; 4.5 AEROSOL RESPIRATORY (INHALATION) at 09:10

## 2017-06-10 RX ADMIN — ATORVASTATIN CALCIUM 20 MILLIGRAM(S): 80 TABLET, FILM COATED ORAL at 21:06

## 2017-06-10 RX ADMIN — MIDODRINE HYDROCHLORIDE 30 MILLIGRAM(S): 2.5 TABLET ORAL at 21:05

## 2017-06-10 RX ADMIN — SEVELAMER CARBONATE 800 MILLIGRAM(S): 2400 POWDER, FOR SUSPENSION ORAL at 09:10

## 2017-06-10 RX ADMIN — SEVELAMER CARBONATE 800 MILLIGRAM(S): 2400 POWDER, FOR SUSPENSION ORAL at 17:30

## 2017-06-10 RX ADMIN — WARFARIN SODIUM 3 MILLIGRAM(S): 2.5 TABLET ORAL at 17:30

## 2017-06-10 RX ADMIN — MIDODRINE HYDROCHLORIDE 30 MILLIGRAM(S): 2.5 TABLET ORAL at 05:22

## 2017-06-10 RX ADMIN — BUDESONIDE AND FORMOTEROL FUMARATE DIHYDRATE 2 PUFF(S): 160; 4.5 AEROSOL RESPIRATORY (INHALATION) at 21:06

## 2017-06-10 NOTE — PROGRESS NOTE ADULT - SUBJECTIVE AND OBJECTIVE BOX
Patient is a 62y old  Male who presents with a chief complaint of sob (09 May 2017 15:32)    Pertinent ROS: on 50% FM, SOB (+) but alleviated with postion change    MEDICATIONS  (STANDING):  finasteride 5milliGRAM(s) Oral daily  amiodarone    Tablet 200milliGRAM(s) Oral daily  atorvastatin 20milliGRAM(s) Oral at bedtime  docusate sodium 100milliGRAM(s) Oral daily  midodrine 30milliGRAM(s) Oral every 8 hours  sevelamer hydrochloride 800milliGRAM(s) Oral three times a day with meals  levothyroxine 75MICROGram(s) Oral daily  insulin lispro (HumaLOG) corrective regimen sliding scale  SubCutaneous three times a day before meals  insulin lispro (HumaLOG) corrective regimen sliding scale  SubCutaneous at bedtime  dextrose 5%. 1000milliLiter(s) IV Continuous <Continuous>  dextrose 50% Injectable 12.5Gram(s) IV Push once  dextrose 50% Injectable 25Gram(s) IV Push once  dextrose 50% Injectable 25Gram(s) IV Push once  DOBUTamine Infusion 7MICROgram(s)/kG/Min IV Continuous <Continuous>  acetaminophen   Tablet. 650milliGRAM(s) Oral once  levETIRAcetam  IVPB 500milliGRAM(s) IV Intermittent daily  levETIRAcetam  IVPB 250milliGRAM(s) IV Intermittent daily  buDESOnide 160 MICROgram(s)/formoterol 4.5 MICROgram(s) Inhaler 2Puff(s) Inhalation two times a day  epoetin alba Injectable [id number](s) IV Push <User Schedule>  warfarin 3milliGRAM(s) Oral once    MEDICATIONS  (PRN):  dextrose Gel 1Dose(s) Oral once PRN Blood Glucose LESS THAN 70 milliGRAM(s)/deciliter  glucagon  Injectable 1milliGRAM(s) IntraMuscular once PRN Glucose LESS THAN 70 milligrams/deciliter  acetaminophen   Tablet 650milliGRAM(s) Oral every 6 hours PRN For Temp greater than 38 C (100.4 F)  acetaminophen    Suspension. 650milliGRAM(s) Oral every 6 hours PRN Moderate Pain (4 - 6)  artificial tears (preservative free) Ophthalmic Solution 1Drop(s) Both EYES three times a day PRN Dry Eyes  sodium chloride 0.65% Nasal 1Spray(s) Both Nostrils four times a day PRN Nasal Congestion    Vital Signs Last 24 Hrs  T(C): 36.8, Max: 36.9 (06-09 @ 21:00)  T(F): 98.3, Max: 98.4 (06-09 @ 21:00)  HR: 87 (82 - 89)  BP: 96/57 (81/50 - 96/57)  BP(mean): --  RR: 18 (16 - 18)  SpO2: 100% (95% - 100%)    I&O's Summary    I & Os for current day (as of 10 Niles 2017 12:01)  =============================================  IN: 448.8 ml / OUT: 0 ml / NET: 448.8 ml    Physical Exam:   Const: NAD  Respiratory: CTA bilat, unlabored, even  CVS: S1, S2 no murmur  GI: abd soft and nontender  CNS alert and follows/answers appropriately  SKIN; dry normal turgor, normal color  : no rosa. On HD    Labs:                        7.6    11.62 )-----------( 184      ( 10 Niles 2017 08:18 )             25.4     06-10    135  |  97<L>  |  23  ----------------------------<  128<H>  3.9   |  28  |  5.06<H>    Ca    8.9      10 Niles 2017 08:18  Mg     2.1     06-10      CAPILLARY BLOOD GLUCOSE  199 (10 Niles 2017 11:40)  131 (10 Niles 2017 08:34)  152 (09 Jun 2017 22:17)  137 (09 Jun 2017 16:45)      PT/INR - ( 10 Niles 2017 08:18 )   PT: 21.6 SEC;   INR: 1.90              D DImer  Cultures:           Wound culture:                06-03 @ 12:50  Organism --  Culture w/ gram stain --  Specimen Source BLOOD      Abscess culture:             06-03 @ 12:50  Organism --  Gram Stain --  Specimen Source BLOOD        Tissue culture:           06-03 @ 12:50  Organism --  Gram Stain --  Specimen Source BLOOD      Body Fluid Smear & Culture:                        06-03 @ 12:50  AFB Smear  --  Culture Acid Fast Body Fluid w/ Smear  --  Culture Acid Fast Smear Concentrated   --    Culture Results:     --  Specimen Source BLOOD        Studies  Chest X-RAY   EXAM:  RAD CHEST PORTABLE IMMEDIATE        PROCEDURE DATE:  Jun 2 2017         INTERPRETATION:  COMPARISON: 5/29    CLINICAL INDICATION: CHF ESRD    Impression:  Portable chest dated 6/2 at 1643 hours shows a right IJ catheter in the   region of right atrium, unchanged in position.  Left-sided single-lead defibrillator  Persistent bilateral pulmonary edema and small pleural effusions.   Heart and mediastinum cannot be evaluated on this projection.    CT SCAN Chest   PROCEDURE DATE:  Jun 5 2017         INTERPRETATION:  CLINICAL INFORMATION: End-stage renal disease and   congestive heart failure. Rule out pneumonia.    PROCEDURE:   CT of the Chest was performed without intravenous contrast.   Intravenous contrast: None.    Reconstructions were performed in axial thin, axial maximum intensity   projection, sagittal and coronal planes.    COMPARISON: Chest x-ray 6/2/2017.    FINDINGS:    A right IJ hemodialysis catheter terminates at the SVC/right atrial   junction.    LUNGS AND LARGE AIRWAYS: Small amount of secretions in the trachea.   Diffuse bilateral groundglass opacities with traction bronchiectasis   throughout the lungs with regions of polygonal sparing bilateral lower   andleft upper lobes. There is honeycombing scattered throughout the   lungs. Interlobular septal thickening is noted in the bilateral upper   lobes. Patchy tree-in-bud opacities are noted in the bilateral lower   lobes. Scattered small calcified granulomas.  PLEURA: Trace bilateral pleural effusions.  VESSELS: Atherosclerotic calcifications of the aorta and coronary   arteries.  HEART: Cardiomegaly. No pericardial effusion.  MEDIASTINUM AND ASH: No lymphadenopathy.  CHEST WALL AND LOWER NECK: Left chest wall AICD. Generalized anasarca.  UPPER ABDOMEN: Hyperdense material within the gallbladder may reflect   vicarious excretion of iodinated contrast from recent procedure.  BONES: Degenerative changes of the spine.    IMPRESSION:    Extensive pulmonary fibrosis.    Evidence of superimposed mild small airway disease.  CT Abdomen  Venous Dopplers: LE:   Others

## 2017-06-10 NOTE — PROGRESS NOTE ADULT - SUBJECTIVE AND OBJECTIVE BOX
CHIEF COMPLAINT:Patient is a 62y old  Male who presents with a chief complaint of sob (09 May 2017 15:32)    	without new complaints        PAST MEDICAL & SURGICAL HISTORY:  Chronic hypotension  AF (atrial fibrillation)  COPD (chronic obstructive pulmonary disease): 4L home O2  HLD (hyperlipidemia)  DM (diabetes mellitus)  ESRD (end stage renal disease) on dialysis  BPH (benign prostatic hypertrophy)  Myocardial infarction: 10/2011  Chronic renal insufficiency  Gout  Dyslipidemia  Diabetes mellitus  CHF (congestive heart failure)  AICD (automatic cardioverter/defibrillator) present: Biotronic - placed 9/11/09  H/O coronary angiogram          REVIEW OF SYSTEMS:  CONSTITUTIONAL: No fever, weight loss, or fatigue  EYES: No eye pain, visual disturbances, or discharge  NECK: No pain or stiffness  RESPIRATORY: No cough, wheezing, chills or hemoptysis; No Shortness of Breath  CARDIOVASCULAR: No chest pain, palpitations, passing out, dizziness, or leg swelling  GASTROINTESTINAL: No abdominal or epigastric pain. No nausea, vomiting, or hematemesis; No diarrhea or constipation. No melena or hematochezia.  GENITOURINARY: No dysuria, frequency, hematuria, or incontinence  NEUROLOGICAL: No headaches, memory loss, loss of strength, numbness, or tremors  SKIN: No itching, burning, rashes, or lesions   LYMPH Nodes: No enlarged glands  ENDOCRINE: No heat or cold intolerance; No hair loss  MUSCULOSKELETAL: No joint pain or swelling; No muscle, back, or extremity pain    Medications:  MEDICATIONS  (STANDING):  finasteride 5milliGRAM(s) Oral daily  amiodarone    Tablet 200milliGRAM(s) Oral daily  atorvastatin 20milliGRAM(s) Oral at bedtime  docusate sodium 100milliGRAM(s) Oral daily  midodrine 30milliGRAM(s) Oral every 8 hours  sevelamer hydrochloride 800milliGRAM(s) Oral three times a day with meals  levothyroxine 75MICROGram(s) Oral daily  insulin lispro (HumaLOG) corrective regimen sliding scale  SubCutaneous three times a day before meals  insulin lispro (HumaLOG) corrective regimen sliding scale  SubCutaneous at bedtime  dextrose 5%. 1000milliLiter(s) IV Continuous <Continuous>  dextrose 50% Injectable 12.5Gram(s) IV Push once  dextrose 50% Injectable 25Gram(s) IV Push once  dextrose 50% Injectable 25Gram(s) IV Push once  DOBUTamine Infusion 7MICROgram(s)/kG/Min IV Continuous <Continuous>  acetaminophen   Tablet. 650milliGRAM(s) Oral once  levETIRAcetam  IVPB 500milliGRAM(s) IV Intermittent daily  levETIRAcetam  IVPB 250milliGRAM(s) IV Intermittent daily  buDESOnide 160 MICROgram(s)/formoterol 4.5 MICROgram(s) Inhaler 2Puff(s) Inhalation two times a day  epoetin alba Injectable 18636Gybj(s) IV Push <User Schedule>  warfarin 3milliGRAM(s) Oral once    MEDICATIONS  (PRN):  dextrose Gel 1Dose(s) Oral once PRN Blood Glucose LESS THAN 70 milliGRAM(s)/deciliter  glucagon  Injectable 1milliGRAM(s) IntraMuscular once PRN Glucose LESS THAN 70 milligrams/deciliter  acetaminophen   Tablet 650milliGRAM(s) Oral every 6 hours PRN For Temp greater than 38 C (100.4 F)  acetaminophen    Suspension. 650milliGRAM(s) Oral every 6 hours PRN Moderate Pain (4 - 6)  artificial tears (preservative free) Ophthalmic Solution 1Drop(s) Both EYES three times a day PRN Dry Eyes  sodium chloride 0.65% Nasal 1Spray(s) Both Nostrils four times a day PRN Nasal Congestion    	    PHYSICAL EXAM:  T(C): 36.8, Max: 36.9 (06-09 @ 21:00)  HR: 87 (82 - 89)  BP: 96/57 (81/50 - 96/57)  RR: 18 (16 - 18)  SpO2: 100% (95% - 100%)  Wt(kg): --  I&O's Summary    I & Os for current day (as of 10 Niles 2017 12:52)  =============================================  IN: 448.8 ml / OUT: 0 ml / NET: 448.8 ml      Appearance: Normal	  HEENT:   Normal oral mucosa, PERRL, EOMI	  Lymphatic: No lymphadenopathy  Cardiovascular: Normal S1 S2, No JVD, No murmurs, No edema  Respiratory: dec bs   Psychiatry: A & O x 3, Mood & affect appropriate  Gastrointestinal:  Soft, Non-tender, + BS	  Skin: No rashes, No ecchymoses, No cyanosis	  Neurologic: Non-focal from motor equal b/l  Extremities: Normal range of motion, edema chronic  Vascular: Peripheral pulses palpable 2+ bilaterally    TELEMETRY: 	    ECG:  	  RADIOLOGY:  OTHER: 	  	  LABS:	 	    CARDIAC MARKERS:                                7.6    11.62 )-----------( 184      ( 10 Niles 2017 08:18 )             25.4     06-10    135  |  97<L>  |  23  ----------------------------<  128<H>  3.9   |  28  |  5.06<H>    Ca    8.9      10 Niles 2017 08:18  Mg     2.1     06-10      proBNP:   Lipid Profile:   HgA1c:   TSH:

## 2017-06-10 NOTE — PROGRESS NOTE ADULT - ASSESSMENT
61 yo Male  with acute on chronic severe systolic CHF (EF 19%), ESRD, DM2, A fib, HCAP  - HCAP - abs , follow up ID, , BiPAP   ct chest noted / pulm eval noted /no steroids as of no value  - Acute on chronic systolic CHF - continue inotropic support as per Cardio, HD for maximum fluid removal  - ESRD - continue HD as per Renal, continue Midodrine. Renal to address possibility of outpatient HD, as pt refusing any palliative care options.  - A fib - continue Amio, follow INR,   - DM2 - controlled, continue with ISS  - Prognosis very poor, explained to patient and family, however, patient is refusing palliative input at this time and wants aggressive life support measures.

## 2017-06-10 NOTE — PROGRESS NOTE ADULT - ASSESSMENT
Patient is a 62y Male with congestive heart failure , End Stage Renal Disease on Dialysis ,pulm edema, resp failure on BiPAP, s/p fever, seizure, hypotension, and pulm fibrosis  1.	End Stage Renal Disease on Dialysis - stable k, volume stable  2.	anemia of renal dis- not well controlled, hb 7.6  3.	Hypertension, stable  4.

## 2017-06-10 NOTE — PROGRESS NOTE ADULT - ASSESSMENT
63 yo M with acute on chronic severe systolic CHF (EF 19%), ESRD, DM2, A fib, HCAP  s/p acute resp distress with accidental choking on Dentures

## 2017-06-10 NOTE — PROGRESS NOTE ADULT - SUBJECTIVE AND OBJECTIVE BOX
[facility] - Mark / Khai PADRON /Jennifer/ VITO García/ VITO Leigh/ Kolton Farr / LISSA Njeru  ---------------------------------------------------------------------------------------------------------------  seen and examined today for End Stage Renal Disease on Dialysis   Interval : plan for dialysis today    VITALS:  T(F): 98.3, Max: 98.4 (06-09 @ 21:00)  HR: 87  BP: 96/57  RR: 18  SpO2: 100%  Wt(kg): --    I & Os for current day (as of 06-10 @ 12:06)  =============================================  IN: 448.8 ml / OUT: 0 ml / NET: 448.8 ml      Physical Exam   Constitutional: NAD  Neck: Supple.  Respiratory: Bilateral equal breath sounds,few basal Crackles present.  Cardiovascular: S1, S2 normal, positive Murmur  Gastrointestinal: Bowel Sounds present, soft, non tender.  Extremities: no Edema Feet,  Pulse present  Neurological: Alert and Oriented x 3, no focal deficits  Psychiatric: Normal mood, normal affect  right IJ dialysis catheter in dressing present    Data:  No Known Allergies    Hospital Medications:   MEDICATIONS  (STANDING):  finasteride 5milliGRAM(s) Oral daily  amiodarone    Tablet 200milliGRAM(s) Oral daily  atorvastatin 20milliGRAM(s) Oral at bedtime  docusate sodium 100milliGRAM(s) Oral daily  midodrine 30milliGRAM(s) Oral every 8 hours  sevelamer hydrochloride 800milliGRAM(s) Oral three times a day with meals  levothyroxine 75MICROGram(s) Oral daily  insulin lispro (HumaLOG) corrective regimen sliding scale  SubCutaneous three times a day before meals  insulin lispro (HumaLOG) corrective regimen sliding scale  SubCutaneous at bedtime  dextrose 5%. 1000milliLiter(s) IV Continuous <Continuous>  dextrose 50% Injectable 12.5Gram(s) IV Push once  dextrose 50% Injectable 25Gram(s) IV Push once  dextrose 50% Injectable 25Gram(s) IV Push once  DOBUTamine Infusion 7MICROgram(s)/kG/Min IV Continuous <Continuous>  acetaminophen   Tablet. 650milliGRAM(s) Oral once  levETIRAcetam  IVPB 500milliGRAM(s) IV Intermittent daily  levETIRAcetam  IVPB 250milliGRAM(s) IV Intermittent daily  buDESOnide 160 MICROgram(s)/formoterol 4.5 MICROgram(s) Inhaler 2Puff(s) Inhalation two times a day  epoetin alba Injectable [id number](s) IV Push <User Schedule>  warfarin 3milliGRAM(s) Oral once    06-10    135  |  97<L>  |  23  ----------------------------<  128<H>  3.9   |  28  |  5.06<H>    Ca    8.9      10 Niles 2017 08:18  Mg     2.1     06-10      Creatinine Trend: 5.06 <--, 3.60 <--, 5.79 <--, 4.92 <--, 5.85 <--, 6.38 <--, 4.82 <--                        7.6    11.62 )-----------( 184      ( 10 Niles 2017 08:18 )             25.4

## 2017-06-10 NOTE — PROGRESS NOTE ADULT - PROBLEM SELECTOR PLAN 1
c/w midodrine and dobutamine. Very poor prognosis considering comorbidity, plan for dialysis today, Ultrafiltration on Dialysis as tolerated  c/w renal diet, fluid restriction.

## 2017-06-11 LAB
BUN SERPL-MCNC: 16 MG/DL — SIGNIFICANT CHANGE UP (ref 7–23)
CALCIUM SERPL-MCNC: 9.1 MG/DL — SIGNIFICANT CHANGE UP (ref 8.4–10.5)
CHLORIDE SERPL-SCNC: 95 MMOL/L — LOW (ref 98–107)
CO2 SERPL-SCNC: 27 MMOL/L — SIGNIFICANT CHANGE UP (ref 22–31)
CREAT SERPL-MCNC: 3.54 MG/DL — HIGH (ref 0.5–1.3)
GLUCOSE SERPL-MCNC: 115 MG/DL — HIGH (ref 70–99)
HCT VFR BLD CALC: 27.4 % — LOW (ref 39–50)
HGB BLD-MCNC: 7.9 G/DL — LOW (ref 13–17)
INR BLD: 2.08 — HIGH (ref 0.88–1.17)
MAGNESIUM SERPL-MCNC: 1.9 MG/DL — SIGNIFICANT CHANGE UP (ref 1.6–2.6)
MCHC RBC-ENTMCNC: 28.2 PG — SIGNIFICANT CHANGE UP (ref 27–34)
MCHC RBC-ENTMCNC: 28.8 % — LOW (ref 32–36)
MCV RBC AUTO: 97.9 FL — SIGNIFICANT CHANGE UP (ref 80–100)
PLATELET # BLD AUTO: 180 K/UL — SIGNIFICANT CHANGE UP (ref 150–400)
PMV BLD: 11.9 FL — SIGNIFICANT CHANGE UP (ref 7–13)
POTASSIUM SERPL-MCNC: 3.8 MMOL/L — SIGNIFICANT CHANGE UP (ref 3.5–5.3)
POTASSIUM SERPL-SCNC: 3.8 MMOL/L — SIGNIFICANT CHANGE UP (ref 3.5–5.3)
PROTHROM AB SERPL-ACNC: 23.7 SEC — HIGH (ref 9.8–13.1)
RBC # BLD: 2.8 M/UL — LOW (ref 4.2–5.8)
RBC # FLD: 22.9 % — HIGH (ref 10.3–14.5)
SODIUM SERPL-SCNC: 135 MMOL/L — SIGNIFICANT CHANGE UP (ref 135–145)
WBC # BLD: 9.79 K/UL — SIGNIFICANT CHANGE UP (ref 3.8–10.5)
WBC # FLD AUTO: 9.79 K/UL — SIGNIFICANT CHANGE UP (ref 3.8–10.5)

## 2017-06-11 RX ORDER — WARFARIN SODIUM 2.5 MG/1
3 TABLET ORAL ONCE
Qty: 0 | Refills: 0 | Status: COMPLETED | OUTPATIENT
Start: 2017-06-11 | End: 2017-06-11

## 2017-06-11 RX ADMIN — BUDESONIDE AND FORMOTEROL FUMARATE DIHYDRATE 2 PUFF(S): 160; 4.5 AEROSOL RESPIRATORY (INHALATION) at 08:54

## 2017-06-11 RX ADMIN — Medication 2: at 11:43

## 2017-06-11 RX ADMIN — SEVELAMER CARBONATE 800 MILLIGRAM(S): 2400 POWDER, FOR SUSPENSION ORAL at 08:54

## 2017-06-11 RX ADMIN — MIDODRINE HYDROCHLORIDE 30 MILLIGRAM(S): 2.5 TABLET ORAL at 13:32

## 2017-06-11 RX ADMIN — MIDODRINE HYDROCHLORIDE 30 MILLIGRAM(S): 2.5 TABLET ORAL at 05:04

## 2017-06-11 RX ADMIN — LEVETIRACETAM 400 MILLIGRAM(S): 250 TABLET, FILM COATED ORAL at 11:37

## 2017-06-11 RX ADMIN — SEVELAMER CARBONATE 800 MILLIGRAM(S): 2400 POWDER, FOR SUSPENSION ORAL at 11:37

## 2017-06-11 RX ADMIN — Medication 650 MILLIGRAM(S): at 06:10

## 2017-06-11 RX ADMIN — FINASTERIDE 5 MILLIGRAM(S): 5 TABLET, FILM COATED ORAL at 11:37

## 2017-06-11 RX ADMIN — MIDODRINE HYDROCHLORIDE 30 MILLIGRAM(S): 2.5 TABLET ORAL at 22:11

## 2017-06-11 RX ADMIN — Medication 650 MILLIGRAM(S): at 05:12

## 2017-06-11 RX ADMIN — WARFARIN SODIUM 3 MILLIGRAM(S): 2.5 TABLET ORAL at 17:31

## 2017-06-11 RX ADMIN — SEVELAMER CARBONATE 800 MILLIGRAM(S): 2400 POWDER, FOR SUSPENSION ORAL at 17:31

## 2017-06-11 RX ADMIN — BUDESONIDE AND FORMOTEROL FUMARATE DIHYDRATE 2 PUFF(S): 160; 4.5 AEROSOL RESPIRATORY (INHALATION) at 22:13

## 2017-06-11 RX ADMIN — AMIODARONE HYDROCHLORIDE 200 MILLIGRAM(S): 400 TABLET ORAL at 05:04

## 2017-06-11 RX ADMIN — Medication 75 MICROGRAM(S): at 05:04

## 2017-06-11 RX ADMIN — ATORVASTATIN CALCIUM 20 MILLIGRAM(S): 80 TABLET, FILM COATED ORAL at 22:11

## 2017-06-11 NOTE — PROGRESS NOTE ADULT - ASSESSMENT
61 yo Male  with acute on chronic severe systolic CHF (EF 19%), ESRD, DM2, A fib, HCAP  - HCAP - abs , follow up ID, , BiPAP   ct chest noted / pulm eval noted /no steroids as of no value  - Acute on chronic systolic CHF - continue inotropic support as per Cardio, HD for maximum fluid removal  - ESRD - continue HD as per Renal, continue Midodrine.   as pt refusing any palliative care options.  - A fib - continue Amio, follow INR,   - DM2 - controlled, continue with ISS  - Prognosis very poor, explained to patient and family, however, patient is refusing palliative input at this time and wants aggressive life support measures.

## 2017-06-11 NOTE — PROGRESS NOTE ADULT - SUBJECTIVE AND OBJECTIVE BOX
CHIEF COMPLAINT:Patient is a 62y old  Male  no new complaints    	        PAST MEDICAL & SURGICAL HISTORY:  Chronic hypotension  AF (atrial fibrillation)  COPD (chronic obstructive pulmonary disease): 4L home O2  HLD (hyperlipidemia)  DM (diabetes mellitus)  ESRD (end stage renal disease) on dialysis  BPH (benign prostatic hypertrophy)  Myocardial infarction: 10/2011  Chronic renal insufficiency  Gout  Dyslipidemia  Diabetes mellitus  CHF (congestive heart failure)  AICD (automatic cardioverter/defibrillator) present: Biotronic - placed 9/11/09  H/O coronary angiogram          REVIEW OF SYSTEMS:  CONSTITUTIONAL: No fever, weight loss, or fatigue  EYES: No eye pain, visual disturbances, or discharge  NECK: No pain or stiffness  RESPIRATORY: sob   CARDIOVASCULAR: No chest pain, palpitations, passing out, dizziness, or leg swelling  GASTROINTESTINAL: No abdominal or epigastric pain. No nausea, vomiting, or hematemesis; No diarrhea or constipation. No melena or hematochezia.  GENITOURINARY: No dysuria, frequency, hematuria, or incontinence  NEUROLOGICAL: No headaches, memory loss, loss of strength, numbness, or tremors  SKIN: No itching, burning, rashes, or lesions   LYMPH Nodes: No enlarged glands  ENDOCRINE: No heat or cold intolerance; No hair loss  MUSCULOSKELETAL: No joint pain or swelling; No muscle, back, or extremity pain    Medications:  MEDICATIONS  (STANDING):  finasteride 5milliGRAM(s) Oral daily  amiodarone    Tablet 200milliGRAM(s) Oral daily  atorvastatin 20milliGRAM(s) Oral at bedtime  docusate sodium 100milliGRAM(s) Oral daily  midodrine 30milliGRAM(s) Oral every 8 hours  sevelamer hydrochloride 800milliGRAM(s) Oral three times a day with meals  levothyroxine 75MICROGram(s) Oral daily  insulin lispro (HumaLOG) corrective regimen sliding scale  SubCutaneous three times a day before meals  insulin lispro (HumaLOG) corrective regimen sliding scale  SubCutaneous at bedtime  dextrose 5%. 1000milliLiter(s) IV Continuous <Continuous>  dextrose 50% Injectable 12.5Gram(s) IV Push once  dextrose 50% Injectable 25Gram(s) IV Push once  dextrose 50% Injectable 25Gram(s) IV Push once  DOBUTamine Infusion 7MICROgram(s)/kG/Min IV Continuous <Continuous>  acetaminophen   Tablet. 650milliGRAM(s) Oral once  levETIRAcetam  IVPB 500milliGRAM(s) IV Intermittent daily  levETIRAcetam  IVPB 250milliGRAM(s) IV Intermittent daily  buDESOnide 160 MICROgram(s)/formoterol 4.5 MICROgram(s) Inhaler 2Puff(s) Inhalation two times a day  epoetin alba Injectable [id number](s) IV Push <User Schedule>    MEDICATIONS  (PRN):  dextrose Gel 1Dose(s) Oral once PRN Blood Glucose LESS THAN 70 milliGRAM(s)/deciliter  glucagon  Injectable 1milliGRAM(s) IntraMuscular once PRN Glucose LESS THAN 70 milligrams/deciliter  acetaminophen   Tablet 650milliGRAM(s) Oral every 6 hours PRN For Temp greater than 38 C (100.4 F)  acetaminophen    Suspension. 650milliGRAM(s) Oral every 6 hours PRN Moderate Pain (4 - 6)  artificial tears (preservative free) Ophthalmic Solution 1Drop(s) Both EYES three times a day PRN Dry Eyes  sodium chloride 0.65% Nasal 1Spray(s) Both Nostrils four times a day PRN Nasal Congestion    	    PHYSICAL EXAM:  T(C): 36.5, Max: 36.9 (06-10 @ 12:45)  HR: 76 (73 - 87)  BP: 87/51 (87/51 - 115/68)  RR: 18 (18 - 20)  SpO2: 100% (96% - 100%)  Wt(kg): --  I&O's Summary    I & Os for current day (as of 11 Jun 2017 08:38)  =============================================  IN: 1064.8 ml / OUT: 3100 ml / NET: -2035.2 ml      Appearance: Normal	  HEENT:   Normal oral mucosa, PERRL, EOMI	  Lymphatic: No lymphadenopathy  Cardiovascular: Normal S1 S2, No JVD, No murmurs, No edema  Respiratory: dec bs b/l  Psychiatry: A & O x 3, Mood & affect appropriate  Gastrointestinal:  Soft, Non-tender, + BS	  Skin: No rashes, No ecchymoses, No cyanosis	  Neurologic: Non-focal from  Extremities: chronic lower ext edema  Vascular: Peripheral pulses palpable 2+ bilaterally    TELEMETRY: 	    ECG:  	  RADIOLOGY:  OTHER: 	  	  LABS:	 	    CARDIAC MARKERS:                                7.9    9.79  )-----------( 180      ( 11 Jun 2017 06:00 )             27.4     06-11    135  |  95<L>  |  16  ----------------------------<  115<H>  3.8   |  27  |  3.54<H>    Ca    9.1      11 Jun 2017 06:00  Mg     1.9     06-11      proBNP:   Lipid Profile:   HgA1c:   TSH:

## 2017-06-11 NOTE — PROGRESS NOTE ADULT - ASSESSMENT
61 yo M with acute on chronic severe systolic CHF (EF 19%), ESRD, DM2, A fib, HCAP  s/p acute resp distress with accidental choking on Dentures

## 2017-06-11 NOTE — PROGRESS NOTE ADULT - PROBLEM SELECTOR PLAN 3
Conservative management. Risk, benefit ratio discussed with the patient's wife. Palliative care consult appreciated

## 2017-06-11 NOTE — PROGRESS NOTE ADULT - SUBJECTIVE AND OBJECTIVE BOX
Pertinent ROS: on face mask. Pt used Bipap yesterday for feeling of dyspnea    MEDICATIONS  (STANDING):  finasteride 5milliGRAM(s) Oral daily  amiodarone    Tablet 200milliGRAM(s) Oral daily  atorvastatin 20milliGRAM(s) Oral at bedtime  docusate sodium 100milliGRAM(s) Oral daily  midodrine 30milliGRAM(s) Oral every 8 hours  sevelamer hydrochloride 800milliGRAM(s) Oral three times a day with meals  levothyroxine 75MICROGram(s) Oral daily  insulin lispro (HumaLOG) corrective regimen sliding scale  SubCutaneous three times a day before meals  insulin lispro (HumaLOG) corrective regimen sliding scale  SubCutaneous at bedtime  dextrose 5%. 1000milliLiter(s) IV Continuous <Continuous>  dextrose 50% Injectable 12.5Gram(s) IV Push once  dextrose 50% Injectable 25Gram(s) IV Push once  dextrose 50% Injectable 25Gram(s) IV Push once  DOBUTamine Infusion 7MICROgram(s)/kG/Min IV Continuous <Continuous>  acetaminophen   Tablet. 650milliGRAM(s) Oral once  levETIRAcetam  IVPB 500milliGRAM(s) IV Intermittent daily  levETIRAcetam  IVPB 250milliGRAM(s) IV Intermittent daily  buDESOnide 160 MICROgram(s)/formoterol 4.5 MICROgram(s) Inhaler 2Puff(s) Inhalation two times a day  epoetin alba Injectable 39864Mnro(s) IV Push <User Schedule>  warfarin 3milliGRAM(s) Oral once    MEDICATIONS  (PRN):  dextrose Gel 1Dose(s) Oral once PRN Blood Glucose LESS THAN 70 milliGRAM(s)/deciliter  glucagon  Injectable 1milliGRAM(s) IntraMuscular once PRN Glucose LESS THAN 70 milligrams/deciliter  acetaminophen   Tablet 650milliGRAM(s) Oral every 6 hours PRN For Temp greater than 38 C (100.4 F)  acetaminophen    Suspension. 650milliGRAM(s) Oral every 6 hours PRN Moderate Pain (4 - 6)  artificial tears (preservative free) Ophthalmic Solution 1Drop(s) Both EYES three times a day PRN Dry Eyes  sodium chloride 0.65% Nasal 1Spray(s) Both Nostrils four times a day PRN Nasal Congestion    Vital Signs Last 24 Hrs  T(C): 36.3, Max: 36.8 (06-10 @ 20:45)  T(F): 97.3, Max: 98.3 (06-10 @ 20:45)  HR: 84 (73 - 84)  BP: 122/93 (87/51 - 122/93)  BP(mean): --  RR: 18 (18 - 20)  SpO2: 100% (96% - 100%)    I&O's Summary  I & Os for 24h ending 11 Jun 2017 07:00  =============================================  IN: 1064.8 ml / OUT: 3100 ml / NET: -2035.2 ml    I & Os for current day (as of 11 Jun 2017 12:57)  =============================================  IN: 200 ml / OUT: 0 ml / NET: 200 ml    Physical Exam:   Const: NAD  Respiratory: CTA bilat  CVS: S1, S2 no murmur  GI: abd soft non tender  CNS Alert, follows commends  SKIN dry, normal color and turgor  : no Bellamy on HD  Labs:                7.9    9.79  )-----------( 180      ( 11 Jun 2017 06:00 )             27.4     06-11    135  |  95<L>  |  16  ----------------------------<  115<H>  3.8   |  27  |  3.54<H>    Ca    9.1      11 Jun 2017 06:00  Mg     1.9     06-11      CAPILLARY BLOOD GLUCOSE  216 (11 Jun 2017 11:45)  118 (11 Jun 2017 08:22)  165 (10 Niles 2017 22:06)  110 (10 Niles 2017 17:12)      PT/INR - ( 11 Jun 2017 06:00 )   PT: 23.7 SEC;   INR: 2.08          D DImer  Cultures:     Studies  Chest X-RAY    EXAM:  RAD CHEST PORTABLE IMMEDIATE        PROCEDURE DATE:  Jun 2 2017         INTERPRETATION:  COMPARISON: 5/29    CLINICAL INDICATION: CHF ESRD    Impression:  Portable chest dated 6/2 at 1643 hours shows a right IJ catheter in the   region of right atrium, unchanged in position.  Left-sided single-lead defibrillator  Persistent bilateral pulmonary edema and small pleural effusions.   Heart and mediastinum cannot be evaluated on this projection.    CT SCAN Chest     EXAM:  CT CHEST        PROCEDURE DATE:  Jun 5 2017         INTERPRETATION:  CLINICAL INFORMATION: End-stage renal disease and   congestive heart failure. Rule out pneumonia.    PROCEDURE:   CT of the Chest was performed without intravenous contrast.   Intravenous contrast: None.    Reconstructions were performed in axial thin, axial maximum intensity   projection, sagittal and coronal planes.    COMPARISON: Chest x-ray 6/2/2017.    FINDINGS:    A right IJ hemodialysis catheter terminates at the SVC/right atrial   junction.    LUNGS AND LARGE AIRWAYS: Small amount of secretions in the trachea.   Diffuse bilateral groundglass opacities with traction bronchiectasis   throughout the lungs with regions of polygonal sparing bilateral lower   andleft upper lobes. There is honeycombing scattered throughout the   lungs. Interlobular septal thickening is noted in the bilateral upper   lobes. Patchy tree-in-bud opacities are noted in the bilateral lower   lobes. Scattered small calcified granulomas.  PLEURA: Trace bilateral pleural effusions.  VESSELS: Atherosclerotic calcifications of the aorta and coronary   arteries.  HEART: Cardiomegaly. No pericardial effusion.  MEDIASTINUM AND ASH: No lymphadenopathy.  CHEST WALL AND LOWER NECK: Left chest wall AICD. Generalized anasarca.  UPPER ABDOMEN: Hyperdense material within the gallbladder may reflect   vicarious excretion of iodinated contrast from recent procedure.  BONES: Degenerative changes of the spine.    IMPRESSION:    Extensive pulmonary fibrosis.    Evidence of superimposed mild small airway disease.  CT Abdomen  Venous Dopplers: LE:     Others

## 2017-06-12 LAB
BUN SERPL-MCNC: 24 MG/DL — HIGH (ref 7–23)
BUN SERPL-MCNC: 24 MG/DL — HIGH (ref 7–23)
CALCIUM SERPL-MCNC: 8.5 MG/DL — SIGNIFICANT CHANGE UP (ref 8.4–10.5)
CALCIUM SERPL-MCNC: 8.5 MG/DL — SIGNIFICANT CHANGE UP (ref 8.4–10.5)
CHLORIDE SERPL-SCNC: 89 MMOL/L — LOW (ref 98–107)
CHLORIDE SERPL-SCNC: 89 MMOL/L — LOW (ref 98–107)
CO2 SERPL-SCNC: 26 MMOL/L — SIGNIFICANT CHANGE UP (ref 22–31)
CO2 SERPL-SCNC: 26 MMOL/L — SIGNIFICANT CHANGE UP (ref 22–31)
CREAT SERPL-MCNC: 4.59 MG/DL — HIGH (ref 0.5–1.3)
CREAT SERPL-MCNC: 4.59 MG/DL — HIGH (ref 0.5–1.3)
GLUCOSE SERPL-MCNC: 309 MG/DL — HIGH (ref 70–99)
GLUCOSE SERPL-MCNC: 309 MG/DL — HIGH (ref 70–99)
HCT VFR BLD CALC: 24.9 % — LOW (ref 39–50)
HGB BLD-MCNC: 7.2 G/DL — LOW (ref 13–17)
INR BLD: 2.65 — HIGH (ref 0.88–1.17)
IRON SATN MFR SERPL: 107 UG/DL — LOW (ref 155–535)
IRON SATN MFR SERPL: 51 UG/DL — SIGNIFICANT CHANGE UP (ref 45–165)
MAGNESIUM SERPL-MCNC: 1.9 MG/DL — SIGNIFICANT CHANGE UP (ref 1.6–2.6)
MCHC RBC-ENTMCNC: 28.3 PG — SIGNIFICANT CHANGE UP (ref 27–34)
MCHC RBC-ENTMCNC: 28.9 % — LOW (ref 32–36)
MCV RBC AUTO: 98 FL — SIGNIFICANT CHANGE UP (ref 80–100)
PLATELET # BLD AUTO: 171 K/UL — SIGNIFICANT CHANGE UP (ref 150–400)
PMV BLD: 11.7 FL — SIGNIFICANT CHANGE UP (ref 7–13)
POTASSIUM SERPL-MCNC: 4.2 MMOL/L — SIGNIFICANT CHANGE UP (ref 3.5–5.3)
POTASSIUM SERPL-MCNC: 4.2 MMOL/L — SIGNIFICANT CHANGE UP (ref 3.5–5.3)
POTASSIUM SERPL-SCNC: 4.2 MMOL/L — SIGNIFICANT CHANGE UP (ref 3.5–5.3)
POTASSIUM SERPL-SCNC: 4.2 MMOL/L — SIGNIFICANT CHANGE UP (ref 3.5–5.3)
PROTHROM AB SERPL-ACNC: 30.3 SEC — HIGH (ref 9.8–13.1)
RBC # BLD: 2.54 M/UL — LOW (ref 4.2–5.8)
RBC # FLD: 23 % — HIGH (ref 10.3–14.5)
SODIUM SERPL-SCNC: 126 MMOL/L — LOW (ref 135–145)
SODIUM SERPL-SCNC: 126 MMOL/L — LOW (ref 135–145)
UIBC SERPL-MCNC: 56 UG/DL — LOW (ref 110–370)
WBC # BLD: 9.23 K/UL — SIGNIFICANT CHANGE UP (ref 3.8–10.5)
WBC # FLD AUTO: 9.23 K/UL — SIGNIFICANT CHANGE UP (ref 3.8–10.5)

## 2017-06-12 RX ORDER — WARFARIN SODIUM 2.5 MG/1
3 TABLET ORAL ONCE
Qty: 0 | Refills: 0 | Status: COMPLETED | OUTPATIENT
Start: 2017-06-12 | End: 2017-06-12

## 2017-06-12 RX ADMIN — MIDODRINE HYDROCHLORIDE 30 MILLIGRAM(S): 2.5 TABLET ORAL at 22:09

## 2017-06-12 RX ADMIN — SEVELAMER CARBONATE 800 MILLIGRAM(S): 2400 POWDER, FOR SUSPENSION ORAL at 08:31

## 2017-06-12 RX ADMIN — MIDODRINE HYDROCHLORIDE 30 MILLIGRAM(S): 2.5 TABLET ORAL at 13:10

## 2017-06-12 RX ADMIN — SEVELAMER CARBONATE 800 MILLIGRAM(S): 2400 POWDER, FOR SUSPENSION ORAL at 17:20

## 2017-06-12 RX ADMIN — ATORVASTATIN CALCIUM 20 MILLIGRAM(S): 80 TABLET, FILM COATED ORAL at 22:09

## 2017-06-12 RX ADMIN — WARFARIN SODIUM 3 MILLIGRAM(S): 2.5 TABLET ORAL at 17:19

## 2017-06-12 RX ADMIN — AMIODARONE HYDROCHLORIDE 200 MILLIGRAM(S): 400 TABLET ORAL at 06:32

## 2017-06-12 RX ADMIN — Medication 75 MICROGRAM(S): at 06:32

## 2017-06-12 RX ADMIN — SEVELAMER CARBONATE 800 MILLIGRAM(S): 2400 POWDER, FOR SUSPENSION ORAL at 11:39

## 2017-06-12 RX ADMIN — LEVETIRACETAM 400 MILLIGRAM(S): 250 TABLET, FILM COATED ORAL at 11:39

## 2017-06-12 RX ADMIN — MIDODRINE HYDROCHLORIDE 30 MILLIGRAM(S): 2.5 TABLET ORAL at 06:32

## 2017-06-12 RX ADMIN — FINASTERIDE 5 MILLIGRAM(S): 5 TABLET, FILM COATED ORAL at 11:39

## 2017-06-12 RX ADMIN — BUDESONIDE AND FORMOTEROL FUMARATE DIHYDRATE 2 PUFF(S): 160; 4.5 AEROSOL RESPIRATORY (INHALATION) at 08:31

## 2017-06-12 RX ADMIN — BUDESONIDE AND FORMOTEROL FUMARATE DIHYDRATE 2 PUFF(S): 160; 4.5 AEROSOL RESPIRATORY (INHALATION) at 22:11

## 2017-06-12 NOTE — PROGRESS NOTE ADULT - ASSESSMENT
Patient is a 62y Male with congestive heart failure , End Stage Renal Disease on Dialysis ,pulm edema, pulm fibrosis, resp failure on BiPAP, s/p seizure, w/persistent hypotension,  1.	End Stage Renal Disease on Dialysis - k wnl, hypervolemic  2.	anemia of renal dis- not well controlled, hb 7.2, on max epogen w/HD

## 2017-06-12 NOTE — PROGRESS NOTE ADULT - ASSESSMENT
63 yo Male  with acute on chronic severe systolic CHF (EF 19%), ESRD, DM2, A fib, HCAP  - HCAP - resolved  - Pulmonary fibrosis, COPD - c/w inhalers, no benefit of steroids  - Acute on chronic systolic CHF - continue inotropic support as per Cardio, HD for maximum fluid removal  - ESRD - continue HD as per Renal, continue Midodrine.  - A fib - continue Amio, follow INR,   - DM2 - controlled, continue with ISS  - Prognosis very poor, but palliative care options refused by family and pt, will explore possibility of SNIF placement with on site HD

## 2017-06-12 NOTE — PROGRESS NOTE ADULT - PROBLEM SELECTOR PLAN 1
c/w midodrine and dobutamine. plan for seQ Ultrafiltration today- 3hrs, 2L UF as tolerated. HD tomorrow w/2K bath, uf 2.5L as tolerated  c/w renal diet, fluid restriction.   Very poor prognosis considering comorbidities

## 2017-06-12 NOTE — PROGRESS NOTE ADULT - SUBJECTIVE AND OBJECTIVE BOX
CHIEF COMPLAINT:Patient is a 62y old  Male who presents with a chief complaint of sob (09 May 2017 15:32)    	  Interval history:      Allergies:  No Known Allergies      PAST MEDICAL & SURGICAL HISTORY:  Chronic hypotension  AF (atrial fibrillation)  COPD (chronic obstructive pulmonary disease): 4L home O2  HLD (hyperlipidemia)  DM (diabetes mellitus)  ESRD (end stage renal disease) on dialysis  BPH (benign prostatic hypertrophy)  Myocardial infarction: 10/2011  Chronic renal insufficiency  Gout  Dyslipidemia  Diabetes mellitus  CHF (congestive heart failure)  AICD (automatic cardioverter/defibrillator) present: Biotronic - placed 9/11/09  H/O coronary angiogram      FAMILY HISTORY:  No pertinent family history in first degree relatives      REVIEW OF SYSTEMS:  CONSTITUTIONAL: No fever, weight loss, or fatigue  EYES: No eye pain, visual disturbances, or discharge  NECK: No pain or stiffness  RESPIRATORY: No cough or wheezing, no shortness of breath  CARDIOVASCULAR: No chest pain, palpitations, dizziness, or leg swelling  GASTROINTESTINAL: No abdominal or epigastric pain. No nausea, vomiting, diarrhea or constipation  GENITOURINARY: No dysuria, urinary frequency or urgency, no hematuria  NEUROLOGICAL: No headaches, memory loss, loss of strength, numbness, or tremors  SKIN: No itching, burning, rashes, or lesions   MUSCULOSKELETAL: No joint pain or swelling; No muscle, back, or extremity pain    Medications:  MEDICATIONS  (STANDING):  finasteride 5milliGRAM(s) Oral daily  amiodarone    Tablet 200milliGRAM(s) Oral daily  atorvastatin 20milliGRAM(s) Oral at bedtime  docusate sodium 100milliGRAM(s) Oral daily  midodrine 30milliGRAM(s) Oral every 8 hours  sevelamer hydrochloride 800milliGRAM(s) Oral three times a day with meals  levothyroxine 75MICROGram(s) Oral daily  insulin lispro (HumaLOG) corrective regimen sliding scale  SubCutaneous three times a day before meals  insulin lispro (HumaLOG) corrective regimen sliding scale  SubCutaneous at bedtime  dextrose 5%. 1000milliLiter(s) IV Continuous <Continuous>  dextrose 50% Injectable 12.5Gram(s) IV Push once  dextrose 50% Injectable 25Gram(s) IV Push once  dextrose 50% Injectable 25Gram(s) IV Push once  DOBUTamine Infusion 7MICROgram(s)/kG/Min IV Continuous <Continuous>  acetaminophen   Tablet. 650milliGRAM(s) Oral once  levETIRAcetam  IVPB 500milliGRAM(s) IV Intermittent daily  levETIRAcetam  IVPB 250milliGRAM(s) IV Intermittent daily  buDESOnide 160 MICROgram(s)/formoterol 4.5 MICROgram(s) Inhaler 2Puff(s) Inhalation two times a day  epoetin alba Injectable 09489Awko(s) IV Push <User Schedule>  warfarin 3milliGRAM(s) Oral once    MEDICATIONS  (PRN):  dextrose Gel 1Dose(s) Oral once PRN Blood Glucose LESS THAN 70 milliGRAM(s)/deciliter  glucagon  Injectable 1milliGRAM(s) IntraMuscular once PRN Glucose LESS THAN 70 milligrams/deciliter  acetaminophen   Tablet 650milliGRAM(s) Oral every 6 hours PRN For Temp greater than 38 C (100.4 F)  acetaminophen    Suspension. 650milliGRAM(s) Oral every 6 hours PRN Moderate Pain (4 - 6)  artificial tears (preservative free) Ophthalmic Solution 1Drop(s) Both EYES three times a day PRN Dry Eyes  sodium chloride 0.65% Nasal 1Spray(s) Both Nostrils four times a day PRN Nasal Congestion    	    PHYSICAL EXAM:  T(C): 36.8, Max: 37 (06-11 @ 16:45)  HR: 80 (76 - 84)  BP: 91/47 (87/49 - 106/58)  RR: 18 (16 - 18)  SpO2: 90% (88% - 100%)  Wt(kg): --  I&O's Summary    I & Os for current day (as of 12 Jun 2017 15:13)  =============================================  IN: 605 ml / OUT: 0 ml / NET: 605 ml      Appearance: Normal	  HEENT:   NCAT, PERRL, EOMI	  Lymphatic: No lymphadenopathy  Cardiovascular: Normal S1 S2, RRR  Respiratory: Lungs clear to auscultation BL  Psychiatry: A & O x 3, Mood & affect appropriate  Gastrointestinal:  Soft, Non-tender, + BS  Skin: No rashes, No ecchymoses, No cyanosis	  Neurologic: Non-focal  Extremities: Normal range of motion, No clubbing, cyanosis or edema    	  LABS:	 	                          7.2    9.23  )-----------( 171      ( 12 Jun 2017 06:30 )             24.9     06-12    126<L>  |  89<L>  |  24<H>  ----------------------------<  309<H>  4.2   |  26  |  4.59<H>    Ca    8.5      12 Jun 2017 06:30  Mg     1.9     06-12      proBNP:   Lipid Profile:   HgA1c:   TSH:

## 2017-06-12 NOTE — PROGRESS NOTE ADULT - SUBJECTIVE AND OBJECTIVE BOX
[facility] - Mark / Khai PADRON /Jennifer/ VITO García/ VITO Leigh/ Kolton Farr / LISSA Njeru  ---------------------------------------------------------------------------------------------------------------  seen and examined today for End Stage Renal Disease on Dialysis   Interval : plan for dialysis today. on BiPAP overnight. destaturated now on NC02, placed on VM. Wife bedside  sob stable. denies cp    VITALS:  Vital Signs Last 24 Hrs  T(C): 36.9, Max: 37 (06-11 @ 16:45)  T(F): 98.5, Max: 98.6 (06-11 @ 16:45)  HR: 79 (76 - 85)  BP: 88/42 (87/49 - 106/58)  BP(mean): --  RR: 16 (16 - 18)  SpO2: 91% (88% - 100%)    I&O's Summary    I & Os for current day (as of 12 Jun 2017 12:39)  =============================================  IN: 605 ml / OUT: 0 ml / NET: 605 ml    Physical Exam   Constitutional: NAD  Neck: Supple.  Respiratory: Bilateral equal breath sounds, few basal Crackles present.  Cardiovascular: S1, S2 normal, positive Murmur  Gastrointestinal: Bowel Sounds present, soft, non tender.  Extremities: no Edema Feet,  Pulse present  Neurological: Alert and Oriented x 3, no focal deficits  Psychiatric: Normal mood, normal affect  right IJ dialysis catheter in dressing present    Data:  No Known Allergies    Hospital Medications:   MEDICATIONS  (STANDING):  finasteride 5milliGRAM(s) Oral daily  amiodarone    Tablet 200milliGRAM(s) Oral daily  atorvastatin 20milliGRAM(s) Oral at bedtime  docusate sodium 100milliGRAM(s) Oral daily  midodrine 30milliGRAM(s) Oral every 8 hours  sevelamer hydrochloride 800milliGRAM(s) Oral three times a day with meals  levothyroxine 75MICROGram(s) Oral daily  insulin lispro (HumaLOG) corrective regimen sliding scale  SubCutaneous three times a day before meals  insulin lispro (HumaLOG) corrective regimen sliding scale  SubCutaneous at bedtime  dextrose 5%. 1000milliLiter(s) IV Continuous <Continuous>  dextrose 50% Injectable 12.5Gram(s) IV Push once  dextrose 50% Injectable 25Gram(s) IV Push once  dextrose 50% Injectable 25Gram(s) IV Push once  DOBUTamine Infusion 7MICROgram(s)/kG/Min IV Continuous <Continuous>  acetaminophen   Tablet. 650milliGRAM(s) Oral once  levETIRAcetam  IVPB 500milliGRAM(s) IV Intermittent daily  levETIRAcetam  IVPB 250milliGRAM(s) IV Intermittent daily  buDESOnide 160 MICROgram(s)/formoterol 4.5 MICROgram(s) Inhaler 2Puff(s) Inhalation two times a day  epoetin alba Injectable 61023Crmg(s) IV Push <User Schedule>  warfarin 3milliGRAM(s) Oral once    Labs    06-12    126<L>  |  89<L>  |  24<H>  ----------------------------<  309<H>  4.2   |  26  |  4.59<H>    Ca    8.5      12 Jun 2017 06:30  Mg     1.9     06-12                          7.2    9.23  )-----------( 171      ( 12 Jun 2017 06:30 )             24.9

## 2017-06-13 LAB
BUN SERPL-MCNC: 34 MG/DL — HIGH (ref 7–23)
CALCIUM SERPL-MCNC: 9 MG/DL — SIGNIFICANT CHANGE UP (ref 8.4–10.5)
CHLORIDE SERPL-SCNC: 87 MMOL/L — LOW (ref 98–107)
CO2 SERPL-SCNC: 23 MMOL/L — SIGNIFICANT CHANGE UP (ref 22–31)
CREAT SERPL-MCNC: 6.03 MG/DL — HIGH (ref 0.5–1.3)
GLUCOSE SERPL-MCNC: 115 MG/DL — HIGH (ref 70–99)
HCT VFR BLD CALC: 26.7 % — LOW (ref 39–50)
HGB BLD-MCNC: 8.1 G/DL — LOW (ref 13–17)
INR BLD: 3.62 — HIGH (ref 0.88–1.17)
MAGNESIUM SERPL-MCNC: 2.1 MG/DL — SIGNIFICANT CHANGE UP (ref 1.6–2.6)
MCHC RBC-ENTMCNC: 29.8 PG — SIGNIFICANT CHANGE UP (ref 27–34)
MCHC RBC-ENTMCNC: 30.3 % — LOW (ref 32–36)
MCV RBC AUTO: 98.2 FL — SIGNIFICANT CHANGE UP (ref 80–100)
PLATELET # BLD AUTO: 190 K/UL — SIGNIFICANT CHANGE UP (ref 150–400)
PMV BLD: 12.5 FL — SIGNIFICANT CHANGE UP (ref 7–13)
POTASSIUM SERPL-MCNC: 4.5 MMOL/L — SIGNIFICANT CHANGE UP (ref 3.5–5.3)
POTASSIUM SERPL-SCNC: 4.5 MMOL/L — SIGNIFICANT CHANGE UP (ref 3.5–5.3)
PROTHROM AB SERPL-ACNC: 41.6 SEC — HIGH (ref 9.8–13.1)
RBC # BLD: 2.72 M/UL — LOW (ref 4.2–5.8)
RBC # FLD: 23.2 % — HIGH (ref 10.3–14.5)
SODIUM SERPL-SCNC: 127 MMOL/L — LOW (ref 135–145)
WBC # BLD: 9.88 K/UL — SIGNIFICANT CHANGE UP (ref 3.8–10.5)
WBC # FLD AUTO: 9.88 K/UL — SIGNIFICANT CHANGE UP (ref 3.8–10.5)

## 2017-06-13 RX ADMIN — BUDESONIDE AND FORMOTEROL FUMARATE DIHYDRATE 2 PUFF(S): 160; 4.5 AEROSOL RESPIRATORY (INHALATION) at 08:38

## 2017-06-13 RX ADMIN — ATORVASTATIN CALCIUM 20 MILLIGRAM(S): 80 TABLET, FILM COATED ORAL at 21:48

## 2017-06-13 RX ADMIN — BUDESONIDE AND FORMOTEROL FUMARATE DIHYDRATE 2 PUFF(S): 160; 4.5 AEROSOL RESPIRATORY (INHALATION) at 21:45

## 2017-06-13 RX ADMIN — Medication 75 MICROGRAM(S): at 05:58

## 2017-06-13 RX ADMIN — ERYTHROPOIETIN 20000 UNIT(S): 10000 INJECTION, SOLUTION INTRAVENOUS; SUBCUTANEOUS at 12:31

## 2017-06-13 RX ADMIN — FINASTERIDE 5 MILLIGRAM(S): 5 TABLET, FILM COATED ORAL at 12:27

## 2017-06-13 RX ADMIN — Medication 20.85 MICROGRAM(S)/KG/MIN: at 08:27

## 2017-06-13 RX ADMIN — LEVETIRACETAM 400 MILLIGRAM(S): 250 TABLET, FILM COATED ORAL at 13:39

## 2017-06-13 RX ADMIN — AMIODARONE HYDROCHLORIDE 200 MILLIGRAM(S): 400 TABLET ORAL at 05:58

## 2017-06-13 RX ADMIN — SEVELAMER CARBONATE 800 MILLIGRAM(S): 2400 POWDER, FOR SUSPENSION ORAL at 17:39

## 2017-06-13 RX ADMIN — LEVETIRACETAM 400 MILLIGRAM(S): 250 TABLET, FILM COATED ORAL at 12:27

## 2017-06-13 RX ADMIN — SEVELAMER CARBONATE 800 MILLIGRAM(S): 2400 POWDER, FOR SUSPENSION ORAL at 08:38

## 2017-06-13 RX ADMIN — MIDODRINE HYDROCHLORIDE 30 MILLIGRAM(S): 2.5 TABLET ORAL at 14:48

## 2017-06-13 RX ADMIN — Medication 1: at 17:39

## 2017-06-13 RX ADMIN — SEVELAMER CARBONATE 800 MILLIGRAM(S): 2400 POWDER, FOR SUSPENSION ORAL at 12:27

## 2017-06-13 RX ADMIN — MIDODRINE HYDROCHLORIDE 30 MILLIGRAM(S): 2.5 TABLET ORAL at 05:58

## 2017-06-13 RX ADMIN — MIDODRINE HYDROCHLORIDE 30 MILLIGRAM(S): 2.5 TABLET ORAL at 21:48

## 2017-06-13 NOTE — PROGRESS NOTE ADULT - SUBJECTIVE AND OBJECTIVE BOX
Pt seen and examined at bedside, during HD  No acute events overnight.   c/o LE weakness, unable to stand.   Denies SOB currently     Allergies:  No Known Allergies    Hospital Medications:   MEDICATIONS  (STANDING):  finasteride 5milliGRAM(s) Oral daily  amiodarone    Tablet 200milliGRAM(s) Oral daily  atorvastatin 20milliGRAM(s) Oral at bedtime  docusate sodium 100milliGRAM(s) Oral daily  midodrine 30milliGRAM(s) Oral every 8 hours  sevelamer hydrochloride 800milliGRAM(s) Oral three times a day with meals  levothyroxine 75MICROGram(s) Oral daily  insulin lispro (HumaLOG) corrective regimen sliding scale  SubCutaneous three times a day before meals  insulin lispro (HumaLOG) corrective regimen sliding scale  SubCutaneous at bedtime  dextrose 5%. 1000milliLiter(s) IV Continuous <Continuous>  dextrose 50% Injectable 12.5Gram(s) IV Push once  dextrose 50% Injectable 25Gram(s) IV Push once  dextrose 50% Injectable 25Gram(s) IV Push once  DOBUTamine Infusion 7MICROgram(s)/kG/Min IV Continuous <Continuous>  acetaminophen   Tablet. 650milliGRAM(s) Oral once  levETIRAcetam  IVPB 500milliGRAM(s) IV Intermittent daily  levETIRAcetam  IVPB 250milliGRAM(s) IV Intermittent daily  buDESOnide 160 MICROgram(s)/formoterol 4.5 MICROgram(s) Inhaler 2Puff(s) Inhalation two times a day  epoetin alba Injectable 88487Roel(s) IV Push <User Schedule>    VITALS:  T(F): 98.2, Max: 98.5 (06-12 @ 12:00)  HR: 83  BP: 89/46  RR: 17  SpO2: 100%  Wt(kg): --  I & Os for 24h ending 06-13 @ 07:00  =============================================  IN: 875 ml / OUT: 2000 ml / NET: -1125 ml    I & Os for current day (as of 06-13 @ 11:35)  =============================================  IN: 101.6 ml / OUT: 0 ml / NET: 101.6 ml      PHYSICAL EXAM:  Constitutional: NAD  HEENT: anicteric sclera, oropharynx clear, MMM  Neck: No JVD  Respiratory: Bibasilar rales   Cardiovascular: S1, S2, RRR  Gastrointestinal: BS+, soft, NT/ND  Extremities: No cyanosis or clubbing. Trace LE peripheral edema  Neurological: A/O x 3, no focal deficits  Psychiatric: Normal mood, normal affect  : No CVA tenderness. No rosa.   Skin: No rashes  Vascular Access: RIJ TUnneled cath     LABS:  06-13    127<L>  |  87<L>  |  34<H>  ----------------------------<  115<H>  4.5   |  23  |  6.03<H>    Ca    9.0      13 Jun 2017 06:06  Mg     2.1     06-13      Creatinine Trend: 6.03 <--, 4.59 <--, 3.54 <--, 5.06 <--, 3.60 <--, 5.79 <--, 4.92 <--                        8.1    9.88  )-----------( 190      ( 13 Jun 2017 06:06 )             26.7     Urine Studies:      RADIOLOGY & ADDITIONAL STUDIES:

## 2017-06-13 NOTE — PROGRESS NOTE ADULT - ASSESSMENT
63 yo M with acute on chronic severe systolic CHF (EF 19%), ESRD, DM2, A fib, HCAP  s/p acute resp distress with accidental choking on Dentures   have been doing reasonably OK at this time.

## 2017-06-13 NOTE — CHART NOTE - NSCHARTNOTEFT_GEN_A_CORE
Called family and left message to call back to discuss discharge planning. Awaiting call back. Called family and left message to call back to discuss discharge planning. Awaiting call back.    Addendum:  Mrs. Plascencia called back. discussed discharge planning. She feels she is unable to take care of patient if he is discharged home. She wishes for patient to be comfortable but rest of family as well as patient wish to continue aggressive medical therapy. We will have a mutli-disciplinary discussion with patient in AM about discharge planning and discuss with patients wife.

## 2017-06-13 NOTE — PROGRESS NOTE ADULT - SUBJECTIVE AND OBJECTIVE BOX
Patient is a 62y old  Male who presents with a chief complaint of sob (09 May 2017 15:32)    on 6 l of oxygen  pt seems to be doing ok  little tired       Any change in ROS: tired    MEDICATIONS  (STANDING):  finasteride 5milliGRAM(s) Oral daily  amiodarone    Tablet 200milliGRAM(s) Oral daily  atorvastatin 20milliGRAM(s) Oral at bedtime  docusate sodium 100milliGRAM(s) Oral daily  midodrine 30milliGRAM(s) Oral every 8 hours  sevelamer hydrochloride 800milliGRAM(s) Oral three times a day with meals  levothyroxine 75MICROGram(s) Oral daily  insulin lispro (HumaLOG) corrective regimen sliding scale  SubCutaneous three times a day before meals  insulin lispro (HumaLOG) corrective regimen sliding scale  SubCutaneous at bedtime  dextrose 5%. 1000milliLiter(s) IV Continuous <Continuous>  dextrose 50% Injectable 12.5Gram(s) IV Push once  dextrose 50% Injectable 25Gram(s) IV Push once  dextrose 50% Injectable 25Gram(s) IV Push once  DOBUTamine Infusion 7MICROgram(s)/kG/Min IV Continuous <Continuous>  acetaminophen   Tablet. 650milliGRAM(s) Oral once  levETIRAcetam  IVPB 500milliGRAM(s) IV Intermittent daily  levETIRAcetam  IVPB 250milliGRAM(s) IV Intermittent daily  buDESOnide 160 MICROgram(s)/formoterol 4.5 MICROgram(s) Inhaler 2Puff(s) Inhalation two times a day  epoetin alba Injectable 67443Jpxk(s) IV Push <User Schedule>    MEDICATIONS  (PRN):  dextrose Gel 1Dose(s) Oral once PRN Blood Glucose LESS THAN 70 milliGRAM(s)/deciliter  glucagon  Injectable 1milliGRAM(s) IntraMuscular once PRN Glucose LESS THAN 70 milligrams/deciliter  acetaminophen   Tablet 650milliGRAM(s) Oral every 6 hours PRN For Temp greater than 38 C (100.4 F)  acetaminophen    Suspension. 650milliGRAM(s) Oral every 6 hours PRN Moderate Pain (4 - 6)  artificial tears (preservative free) Ophthalmic Solution 1Drop(s) Both EYES three times a day PRN Dry Eyes  sodium chloride 0.65% Nasal 1Spray(s) Both Nostrils four times a day PRN Nasal Congestion    Vital Signs Last 24 Hrs  T(C): 36.8, Max: 36.9 (06-13 @ 10:15)  T(F): 98.2, Max: 98.4 (06-13 @ 10:15)  HR: 81 (74 - 85)  BP: 132/83 (86/55 - 132/83)  BP(mean): --  RR: 18 (17 - 19)  SpO2: 100% (94% - 100%)    I&O's Summary  I & Os for 24h ending 13 Jun 2017 07:00  =============================================  IN: 875 ml / OUT: 2000 ml / NET: -1125 ml    I & Os for current day (as of 13 Jun 2017 16:56)  =============================================  IN: 1165.6 ml / OUT: 2500 ml / NET: -1334.4 ml        Physical Exam:   GENERAL: NAD, well-groomed, well-developed  HEENT: BELL/   Atraumatic, Normocephalic  ENMT: No tonsillar erythema, exudates, or enlargement; Moist mucous membranes, Good dentition, No lesions  NECK: Supple, No JVD, Normal thyroid  CHEST/LUNG: crackles bilaterally bases, no wheezing   CVS: Regular rate and rhythm; No murmurs, rubs, or gallops  GI: : Soft, Nontender, Nondistended; Bowel sounds present  NERVOUS SYSTEM:  Alert & Oriented X3, Good concentration; Motor Strength 5/5 B/L upper and lower extremities; DTRs 2+ intact and symmetric  EXTREMITIES:  2+ Peripheral Pulses, No clubbing, cyanosis, or edema  LYMPH: No lymphadenopathy noted  SKIN: No rashes or lesions  ENDOCRINOLOGY: No Thyromegaly  PSYCH: Appropriate  Labs:                              8.1    9.88  )-----------( 190      ( 13 Jun 2017 06:06 )             26.7                         7.2    9.23  )-----------( 171      ( 12 Jun 2017 06:30 )             24.9                         7.9    9.79  )-----------( 180      ( 11 Jun 2017 06:00 )             27.4                         7.6    11.62 )-----------( 184      ( 10 Niles 2017 08:18 )             25.4     06-13    127<L>  |  87<L>  |  34<H>  ----------------------------<  115<H>  4.5   |  23  |  6.03<H>  06-12    126<L>  |  89<L>  |  24<H>  ----------------------------<  309<H>  4.2   |  26  |  4.59<H>  06-11    135  |  95<L>  |  16  ----------------------------<  115<H>  3.8   |  27  |  3.54<H>  06-10    135  |  97<L>  |  23  ----------------------------<  128<H>  3.9   |  28  |  5.06<H>    Ca    9.0      13 Jun 2017 06:06  Ca    8.5      12 Jun 2017 06:30  Mg     2.1     06-13  Mg     1.9     06-12      CAPILLARY BLOOD GLUCOSE  134 (13 Jun 2017 11:47)  145 (13 Jun 2017 08:22)  173 (12 Jun 2017 22:34)        PT/INR - ( 13 Jun 2017 06:06 )   PT: 41.6 SEC;   INR: 3.62          Studies  Chest X-RAY  CT SCAN Chest   Venous Dopplers: LE:   CT Abdomen  Others  IMPRESSION:    Extensive pulmonary fibrosis.    Evidence of superimposed mild small airway disease.

## 2017-06-13 NOTE — PROGRESS NOTE ADULT - SUBJECTIVE AND OBJECTIVE BOX
CHIEF COMPLAINT:Patient is a 62y old  Male who presents with a chief complaint of sob (09 May 2017 15:32)    	  Interval history:      Allergies:  No Known Allergies      PAST MEDICAL & SURGICAL HISTORY:  Chronic hypotension  AF (atrial fibrillation)  COPD (chronic obstructive pulmonary disease): 4L home O2  HLD (hyperlipidemia)  DM (diabetes mellitus)  ESRD (end stage renal disease) on dialysis  BPH (benign prostatic hypertrophy)  Myocardial infarction: 10/2011  Chronic renal insufficiency  Gout  Dyslipidemia  Diabetes mellitus  CHF (congestive heart failure)  AICD (automatic cardioverter/defibrillator) present: Biotronic - placed 9/11/09  H/O coronary angiogram      FAMILY HISTORY:  No pertinent family history in first degree relatives      REVIEW OF SYSTEMS:  CONSTITUTIONAL: No fever, weight loss, or fatigue  EYES: No eye pain, visual disturbances, or discharge  NECK: No pain or stiffness  RESPIRATORY: No cough or wheezing, shortness of breath at baseline  CARDIOVASCULAR: No chest pain, palpitations, dizziness, or leg swelling  GASTROINTESTINAL: No abdominal or epigastric pain. No nausea, vomiting, diarrhea or constipation  GENITOURINARY: No dysuria, urinary frequency or urgency, no hematuria  NEUROLOGICAL: No headaches, memory loss, loss of strength, numbness, or tremors  SKIN: No itching, burning, rashes, or lesions   MUSCULOSKELETAL: No joint pain or swelling; Left hip pain        Medications:  MEDICATIONS  (STANDING):  finasteride 5milliGRAM(s) Oral daily  amiodarone    Tablet 200milliGRAM(s) Oral daily  atorvastatin 20milliGRAM(s) Oral at bedtime  docusate sodium 100milliGRAM(s) Oral daily  midodrine 30milliGRAM(s) Oral every 8 hours  sevelamer hydrochloride 800milliGRAM(s) Oral three times a day with meals  levothyroxine 75MICROGram(s) Oral daily  insulin lispro (HumaLOG) corrective regimen sliding scale  SubCutaneous three times a day before meals  insulin lispro (HumaLOG) corrective regimen sliding scale  SubCutaneous at bedtime  dextrose 5%. 1000milliLiter(s) IV Continuous <Continuous>  dextrose 50% Injectable 12.5Gram(s) IV Push once  dextrose 50% Injectable 25Gram(s) IV Push once  dextrose 50% Injectable 25Gram(s) IV Push once  DOBUTamine Infusion 7MICROgram(s)/kG/Min IV Continuous <Continuous>  acetaminophen   Tablet. 650milliGRAM(s) Oral once  levETIRAcetam  IVPB 500milliGRAM(s) IV Intermittent daily  levETIRAcetam  IVPB 250milliGRAM(s) IV Intermittent daily  buDESOnide 160 MICROgram(s)/formoterol 4.5 MICROgram(s) Inhaler 2Puff(s) Inhalation two times a day  epoetin alba Injectable 42913Oryo(s) IV Push <User Schedule>    MEDICATIONS  (PRN):  dextrose Gel 1Dose(s) Oral once PRN Blood Glucose LESS THAN 70 milliGRAM(s)/deciliter  glucagon  Injectable 1milliGRAM(s) IntraMuscular once PRN Glucose LESS THAN 70 milligrams/deciliter  acetaminophen   Tablet 650milliGRAM(s) Oral every 6 hours PRN For Temp greater than 38 C (100.4 F)  acetaminophen    Suspension. 650milliGRAM(s) Oral every 6 hours PRN Moderate Pain (4 - 6)  artificial tears (preservative free) Ophthalmic Solution 1Drop(s) Both EYES three times a day PRN Dry Eyes  sodium chloride 0.65% Nasal 1Spray(s) Both Nostrils four times a day PRN Nasal Congestion    	    PHYSICAL EXAM:  T(C): 36.9, Max: 36.9 (06-13 @ 10:15)  HR: 81 (74 - 85)  BP: 99/50 (86/55 - 116/63)  RR: 18 (17 - 19)  SpO2: 100% (94% - 100%)  Wt(kg): --  I&O's Summary  I & Os for 24h ending 13 Jun 2017 07:00  =============================================  IN: 875 ml / OUT: 2000 ml / NET: -1125 ml    I & Os for current day (as of 13 Jun 2017 14:27)  =============================================  IN: 101.6 ml / OUT: 0 ml / NET: 101.6 ml      Appearance: Normal	  HEENT:   NCAT, PERRL, EOMI	  Lymphatic: No lymphadenopathy  Cardiovascular: Normal S1 S2, RRR  Respiratory: Lungs clear to auscultation BL  Psychiatry: A & O x 3, Mood & affect appropriate  Gastrointestinal:  Soft, Non-tender, + BS  Skin: No rashes, No ecchymoses, No cyanosis	  Neurologic: Non-focal  Extremities: Normal range of motion, No clubbing, cyanosis or edema    	  LABS:	 	    CARDIAC MARKERS:                                8.1    9.88  )-----------( 190      ( 13 Jun 2017 06:06 )             26.7     06-13    127<L>  |  87<L>  |  34<H>  ----------------------------<  115<H>  4.5   |  23  |  6.03<H>    Ca    9.0      13 Jun 2017 06:06  Mg     2.1     06-13

## 2017-06-13 NOTE — PROGRESS NOTE ADULT - PROBLEM SELECTOR PLAN 1
c/w midodrine and dobutamine.   Tolerated PUF yesterday with UF 1.5kg achieved. Tolerating HD currently.   UF goal 2-2.5kg currently, UF goal limited by hypotension.   c/w renal diet, fluid restriction.   Very poor prognosis considering comorbidities

## 2017-06-13 NOTE — PROGRESS NOTE ADULT - ASSESSMENT
61 yo Male  with acute on chronic severe systolic CHF (EF 19%), ESRD, DM2, A fib, HCAP  - HCAP - resolved  - Pulmonary fibrosis, COPD - c/w inhalers, no benefit of steroids  - Acute on chronic systolic CHF - continue inotropic support as per Cardio, HD for maximum fluid removal  - ESRD - continue HD as per Renal, continue Midodrine.  - A fib - continue Amio, follow INR,   - DM2 - controlled, continue with ISS  - Prognosis very poor, but palliative care options refused by family and pt, will plan for discharge home with supervision, with outpt HD as per Renal, SNIF/SERAFIN option declined by pt

## 2017-06-14 LAB
BUN SERPL-MCNC: 21 MG/DL — SIGNIFICANT CHANGE UP (ref 7–23)
CALCIUM SERPL-MCNC: 8.9 MG/DL — SIGNIFICANT CHANGE UP (ref 8.4–10.5)
CHLORIDE SERPL-SCNC: 92 MMOL/L — LOW (ref 98–107)
CO2 SERPL-SCNC: 22 MMOL/L — SIGNIFICANT CHANGE UP (ref 22–31)
CREAT SERPL-MCNC: 4.01 MG/DL — HIGH (ref 0.5–1.3)
GLUCOSE SERPL-MCNC: 83 MG/DL — SIGNIFICANT CHANGE UP (ref 70–99)
HCT VFR BLD CALC: 26.1 % — LOW (ref 39–50)
HGB BLD-MCNC: 7.7 G/DL — LOW (ref 13–17)
INR BLD: 3.38 — HIGH (ref 0.88–1.17)
MAGNESIUM SERPL-MCNC: 1.9 MG/DL — SIGNIFICANT CHANGE UP (ref 1.6–2.6)
MCHC RBC-ENTMCNC: 29.1 PG — SIGNIFICANT CHANGE UP (ref 27–34)
MCHC RBC-ENTMCNC: 29.5 % — LOW (ref 32–36)
MCV RBC AUTO: 98.5 FL — SIGNIFICANT CHANGE UP (ref 80–100)
PLATELET # BLD AUTO: 189 K/UL — SIGNIFICANT CHANGE UP (ref 150–400)
PMV BLD: 12.2 FL — SIGNIFICANT CHANGE UP (ref 7–13)
POTASSIUM SERPL-MCNC: 4.1 MMOL/L — SIGNIFICANT CHANGE UP (ref 3.5–5.3)
POTASSIUM SERPL-SCNC: 4.1 MMOL/L — SIGNIFICANT CHANGE UP (ref 3.5–5.3)
PROTHROM AB SERPL-ACNC: 38.8 SEC — HIGH (ref 9.8–13.1)
RBC # BLD: 2.65 M/UL — LOW (ref 4.2–5.8)
RBC # FLD: 23.3 % — HIGH (ref 10.3–14.5)
SODIUM SERPL-SCNC: 129 MMOL/L — LOW (ref 135–145)
WBC # BLD: 8.26 K/UL — SIGNIFICANT CHANGE UP (ref 3.8–10.5)
WBC # FLD AUTO: 8.26 K/UL — SIGNIFICANT CHANGE UP (ref 3.8–10.5)

## 2017-06-14 PROCEDURE — 73502 X-RAY EXAM HIP UNI 2-3 VIEWS: CPT | Mod: 26,LT

## 2017-06-14 RX ORDER — WARFARIN SODIUM 2.5 MG/1
1 TABLET ORAL ONCE
Qty: 0 | Refills: 0 | Status: COMPLETED | OUTPATIENT
Start: 2017-06-14 | End: 2017-06-14

## 2017-06-14 RX ADMIN — FINASTERIDE 5 MILLIGRAM(S): 5 TABLET, FILM COATED ORAL at 12:14

## 2017-06-14 RX ADMIN — BUDESONIDE AND FORMOTEROL FUMARATE DIHYDRATE 2 PUFF(S): 160; 4.5 AEROSOL RESPIRATORY (INHALATION) at 21:43

## 2017-06-14 RX ADMIN — Medication 20.85 MICROGRAM(S)/KG/MIN: at 08:40

## 2017-06-14 RX ADMIN — WARFARIN SODIUM 1 MILLIGRAM(S): 2.5 TABLET ORAL at 20:05

## 2017-06-14 RX ADMIN — Medication 75 MICROGRAM(S): at 05:13

## 2017-06-14 RX ADMIN — SEVELAMER CARBONATE 800 MILLIGRAM(S): 2400 POWDER, FOR SUSPENSION ORAL at 08:38

## 2017-06-14 RX ADMIN — MIDODRINE HYDROCHLORIDE 30 MILLIGRAM(S): 2.5 TABLET ORAL at 05:12

## 2017-06-14 RX ADMIN — ATORVASTATIN CALCIUM 20 MILLIGRAM(S): 80 TABLET, FILM COATED ORAL at 21:43

## 2017-06-14 RX ADMIN — MIDODRINE HYDROCHLORIDE 30 MILLIGRAM(S): 2.5 TABLET ORAL at 21:43

## 2017-06-14 RX ADMIN — AMIODARONE HYDROCHLORIDE 200 MILLIGRAM(S): 400 TABLET ORAL at 05:12

## 2017-06-14 RX ADMIN — SEVELAMER CARBONATE 800 MILLIGRAM(S): 2400 POWDER, FOR SUSPENSION ORAL at 12:14

## 2017-06-14 RX ADMIN — BUDESONIDE AND FORMOTEROL FUMARATE DIHYDRATE 2 PUFF(S): 160; 4.5 AEROSOL RESPIRATORY (INHALATION) at 08:38

## 2017-06-14 RX ADMIN — MIDODRINE HYDROCHLORIDE 30 MILLIGRAM(S): 2.5 TABLET ORAL at 13:57

## 2017-06-14 RX ADMIN — LEVETIRACETAM 400 MILLIGRAM(S): 250 TABLET, FILM COATED ORAL at 12:15

## 2017-06-14 RX ADMIN — SEVELAMER CARBONATE 800 MILLIGRAM(S): 2400 POWDER, FOR SUSPENSION ORAL at 20:05

## 2017-06-14 RX ADMIN — LEVETIRACETAM 400 MILLIGRAM(S): 250 TABLET, FILM COATED ORAL at 12:48

## 2017-06-14 NOTE — PROGRESS NOTE ADULT - SUBJECTIVE AND OBJECTIVE BOX
Patient is a 62y old  Male who presents with a chief complaint of sob (09 May 2017 15:32)    on 6 L of oxygen?: doing great!      Any change in ROS:     MEDICATIONS  (STANDING):  finasteride 5milliGRAM(s) Oral daily  amiodarone    Tablet 200milliGRAM(s) Oral daily  atorvastatin 20milliGRAM(s) Oral at bedtime  docusate sodium 100milliGRAM(s) Oral daily  midodrine 30milliGRAM(s) Oral every 8 hours  sevelamer hydrochloride 800milliGRAM(s) Oral three times a day with meals  levothyroxine 75MICROGram(s) Oral daily  insulin lispro (HumaLOG) corrective regimen sliding scale  SubCutaneous three times a day before meals  insulin lispro (HumaLOG) corrective regimen sliding scale  SubCutaneous at bedtime  dextrose 5%. 1000milliLiter(s) IV Continuous <Continuous>  dextrose 50% Injectable 12.5Gram(s) IV Push once  dextrose 50% Injectable 25Gram(s) IV Push once  dextrose 50% Injectable 25Gram(s) IV Push once  DOBUTamine Infusion 7MICROgram(s)/kG/Min IV Continuous <Continuous>  acetaminophen   Tablet. 650milliGRAM(s) Oral once  levETIRAcetam  IVPB 500milliGRAM(s) IV Intermittent daily  levETIRAcetam  IVPB 250milliGRAM(s) IV Intermittent daily  buDESOnide 160 MICROgram(s)/formoterol 4.5 MICROgram(s) Inhaler 2Puff(s) Inhalation two times a day  epoetin alba Injectable 76396Nyot(s) IV Push <User Schedule>    MEDICATIONS  (PRN):  dextrose Gel 1Dose(s) Oral once PRN Blood Glucose LESS THAN 70 milliGRAM(s)/deciliter  glucagon  Injectable 1milliGRAM(s) IntraMuscular once PRN Glucose LESS THAN 70 milligrams/deciliter  acetaminophen   Tablet 650milliGRAM(s) Oral every 6 hours PRN For Temp greater than 38 C (100.4 F)  acetaminophen    Suspension. 650milliGRAM(s) Oral every 6 hours PRN Moderate Pain (4 - 6)  artificial tears (preservative free) Ophthalmic Solution 1Drop(s) Both EYES three times a day PRN Dry Eyes  sodium chloride 0.65% Nasal 1Spray(s) Both Nostrils four times a day PRN Nasal Congestion    Vital Signs Last 24 Hrs  T(C): 36.1, Max: 36.6 (06-13 @ 21:44)  T(F): 97, Max: 97.9 (06-13 @ 21:44)  HR: 83 (79 - 92)  BP: 107/48 (95/51 - 117/56)  BP(mean): --  RR: 18 (18 - 18)  SpO2: 97% (95% - 100%)    I&O's Summary  I & Os for 24h ending 14 Jun 2017 07:00  =============================================  IN: 1449.6 ml / OUT: 2500 ml / NET: -1050.4 ml    I & Os for current day (as of 14 Jun 2017 15:41)  =============================================  IN: 824.2 ml / OUT: 0 ml / NET: 824.2 ml        Physical Exam:   GENERAL: NAD, well-groomed, well-developed  HEENT: BELL/   Atraumatic, Normocephalic  ENMT: No tonsillar erythema, exudates, or enlargement; Moist mucous membranes, Good dentition, No lesions  NECK: Supple, No JVD, Normal thyroid  CHEST/LUNG: bilateral basal crackles   CVS: Regular rate and rhythm; No murmurs, rubs, or gallops  GI: : Soft, Nontender, Nondistended; Bowel sounds present  NERVOUS SYSTEM:  Alert & Oriented X3, Good concentration; Motor Strength 5/5 B/L upper and lower extremities; DTRs 2+ intact and symmetric  EXTREMITIES:  2+ Peripheral Pulses, No clubbing, cyanosis, or edema  LYMPH: No lymphadenopathy noted  SKIN: No rashes or lesions  ENDOCRINOLOGY: No Thyromegaly  PSYCH: Appropriate    Labs:               7.7    8.26  )-----------( 189      ( 14 Jun 2017 09:00 )             26.1                         8.1    9.88  )-----------( 190      ( 13 Jun 2017 06:06 )             26.7                         7.2    9.23  )-----------( 171      ( 12 Jun 2017 06:30 )             24.9                         7.9    9.79  )-----------( 180      ( 11 Jun 2017 06:00 )             27.4     06-14    129<L>  |  92<L>  |  21  ----------------------------<  83  4.1   |  22  |  4.01<H>  06-13    127<L>  |  87<L>  |  34<H>  ----------------------------<  115<H>  4.5   |  23  |  6.03<H>  06-12    126<L>  |  89<L>  |  24<H>  ----------------------------<  309<H>  4.2   |  26  |  4.59<H>  06-11    135  |  95<L>  |  16  ----------------------------<  115<H>  3.8   |  27  |  3.54<H>    Ca    8.9      14 Jun 2017 09:00  Ca    9.0      13 Jun 2017 06:06  Mg     1.9     06-14  Mg     2.1     06-13      CAPILLARY BLOOD GLUCOSE  142 (14 Jun 2017 11:47)  107 (14 Jun 2017 08:37)  127 (13 Jun 2017 21:21)  178 (13 Jun 2017 17:10)        PT/INR - ( 14 Jun 2017 09:00 )   PT: 38.8 SEC;   INR: 3.38         Studies  Chest X-RAY  CT SCAN Chest arteries.  HEART: Cardiomegaly. No pericardial effusion.  MEDIASTINUM AND ASH: No lymphadenopathy.  CHEST WALL AND LOWER NECK: Left chest wall AICD. Generalized anasarca.  UPPER ABDOMEN: Hyperdense material within the gallbladder may reflect   vicarious excretion of iodinated contrast from recent procedure.  BONES: Degenerative changes of the spine.    IMPRESSION:    Extensive pulmonary fibrosis.    Evidence of superimposed mild small airway disease.  Venous Dopplers: LE:   CT Abdomen  Others

## 2017-06-14 NOTE — CHART NOTE - NSCHARTNOTEFT_GEN_A_CORE
Family meeting had with CM/SW and pt/family - wife unable to care for patient at home. His sister is willing to provide the care  for the patient in her home and to transport him to HD in [facility] [place]. CM to discuss more with wife - if patient to go with sister will likely be ready for discharge once equipment moved from his home to sisters home.    It is not clear to me what best situation is. Majority of family understands complicated status of patients health and the amount of care he will require at home but patients sister states adamantly that she is an NP and can help provide it with home care alone. I am not sure that the entire family truly understands patients prognosis.   To me, it is not clear that a safe disposition is truly acquired yet.. A continuing conversation will be have to be had with CHANDAN and VIDAL with regards to dispo.  Appreciate extra time from CHANDAN and VIDAL and Nurse manager for family meeting.  Will follow.

## 2017-06-14 NOTE — PROGRESS NOTE ADULT - SUBJECTIVE AND OBJECTIVE BOX
CHIEF COMPLAINT:Patient is a 62y old  Male who presents with a chief complaint of sob (09 May 2017 15:32)    	  Interval history: pt still c/o Lt hip pain and left sided body twitching      Allergies:  No Known Allergies      PAST MEDICAL & SURGICAL HISTORY:  Chronic hypotension  AF (atrial fibrillation)  COPD (chronic obstructive pulmonary disease): 4L home O2  HLD (hyperlipidemia)  DM (diabetes mellitus)  ESRD (end stage renal disease) on dialysis  BPH (benign prostatic hypertrophy)  Myocardial infarction: 10/2011  Chronic renal insufficiency  Gout  Dyslipidemia  Diabetes mellitus  CHF (congestive heart failure)  AICD (automatic cardioverter/defibrillator) present: Biotronic - placed 9/11/09  H/O coronary angiogram      FAMILY HISTORY:  No pertinent family history in first degree relatives      REVIEW OF SYSTEMS:  CONSTITUTIONAL: No fever, weight loss, or fatigue  EYES: No eye pain, visual disturbances, or discharge  NECK: No pain or stiffness  RESPIRATORY: No cough or wheezing, shortness of breath at baseline  CARDIOVASCULAR: No chest pain, palpitations, dizziness, or leg swelling  GASTROINTESTINAL: No abdominal or epigastric pain. No nausea, vomiting, diarrhea or constipation  GENITOURINARY: No dysuria, urinary frequency or urgency, no hematuria  NEUROLOGICAL: No headaches, memory loss, loss of strength, numbness, or tremors  SKIN: No itching, burning, rashes, or lesions   MUSCULOSKELETAL: No joint pain or swelling; Left hip pain      Medications:  MEDICATIONS  (STANDING):  finasteride 5milliGRAM(s) Oral daily  amiodarone    Tablet 200milliGRAM(s) Oral daily  atorvastatin 20milliGRAM(s) Oral at bedtime  docusate sodium 100milliGRAM(s) Oral daily  midodrine 30milliGRAM(s) Oral every 8 hours  sevelamer hydrochloride 800milliGRAM(s) Oral three times a day with meals  levothyroxine 75MICROGram(s) Oral daily  insulin lispro (HumaLOG) corrective regimen sliding scale  SubCutaneous three times a day before meals  insulin lispro (HumaLOG) corrective regimen sliding scale  SubCutaneous at bedtime  dextrose 5%. 1000milliLiter(s) IV Continuous <Continuous>  dextrose 50% Injectable 12.5Gram(s) IV Push once  dextrose 50% Injectable 25Gram(s) IV Push once  dextrose 50% Injectable 25Gram(s) IV Push once  DOBUTamine Infusion 7MICROgram(s)/kG/Min IV Continuous <Continuous>  acetaminophen   Tablet. 650milliGRAM(s) Oral once  levETIRAcetam  IVPB 500milliGRAM(s) IV Intermittent daily  levETIRAcetam  IVPB 250milliGRAM(s) IV Intermittent daily  buDESOnide 160 MICROgram(s)/formoterol 4.5 MICROgram(s) Inhaler 2Puff(s) Inhalation two times a day  epoetin alba Injectable 04944Kzvk(s) IV Push <User Schedule>    MEDICATIONS  (PRN):  dextrose Gel 1Dose(s) Oral once PRN Blood Glucose LESS THAN 70 milliGRAM(s)/deciliter  glucagon  Injectable 1milliGRAM(s) IntraMuscular once PRN Glucose LESS THAN 70 milligrams/deciliter  acetaminophen   Tablet 650milliGRAM(s) Oral every 6 hours PRN For Temp greater than 38 C (100.4 F)  acetaminophen    Suspension. 650milliGRAM(s) Oral every 6 hours PRN Moderate Pain (4 - 6)  artificial tears (preservative free) Ophthalmic Solution 1Drop(s) Both EYES three times a day PRN Dry Eyes  sodium chloride 0.65% Nasal 1Spray(s) Both Nostrils four times a day PRN Nasal Congestion    	    PHYSICAL EXAM:  T(C): 36.1, Max: 36.6 (06-13 @ 21:44)  HR: 83 (79 - 92)  BP: 107/48 (95/51 - 132/83)  RR: 18 (18 - 18)  SpO2: 97% (95% - 100%)  Wt(kg): --  I&O's Summary  I & Os for 24h ending 14 Jun 2017 07:00  =============================================  IN: 1449.6 ml / OUT: 2500 ml / NET: -1050.4 ml    I & Os for current day (as of 14 Jun 2017 14:28)  =============================================  IN: 278.6 ml / OUT: 0 ml / NET: 278.6 ml      Appearance: Normal	  HEENT:   NCAT, PERRL, EOMI	  Lymphatic: No lymphadenopathy  Cardiovascular: Normal S1 S2, RRR  Respiratory: Lungs clear to auscultation BL  Psychiatry: A & O x 3, Mood & affect appropriate  Gastrointestinal:  Soft, Non-tender, + BS  Skin: No rashes, No ecchymoses, No cyanosis	  Neurologic: Non-focal  Extremities: Normal range of motion, No clubbing, cyanosis or edema      	  LABS:	 	                          7.7    8.26  )-----------( 189      ( 14 Jun 2017 09:00 )             26.1     06-14    129<L>  |  92<L>  |  21  ----------------------------<  83  4.1   |  22  |  4.01<H>    Ca    8.9      14 Jun 2017 09:00  Mg     1.9     06-14

## 2017-06-14 NOTE — PROGRESS NOTE ADULT - PROBLEM SELECTOR PLAN 1
BiPAP 10/5: 50% prn , Keep O2 sat greater than 92%, Conservative management: pts sister wants BiPAP at home: spoken to  to see f it can be arranged

## 2017-06-14 NOTE — PROGRESS NOTE ADULT - ASSESSMENT
61 yo Male  with acute on chronic severe systolic CHF (EF 19%), ESRD, DM2, A fib, HCAP  - HCAP - resolved  - Pulmonary fibrosis, COPD - c/w inhalers, no benefit of steroids  - Acute on chronic systolic CHF - continue inotropic support as per Cardio, HD for maximum fluid removal  - ESRD - continue HD as per Renal, continue Midodrine.  - A fib - continue Amio, follow INR,   - DM2 - controlled, continue with ISS  - Prognosis very poor, but palliative care options refused by family and pt, will plan for discharge home with supervision, with outpt HD as per Renal, SNIF/SERAFIN option declined by pt  - Lt hip pain and left sided body twitching - Xray pending, twitching/jerking likely asterixis, but will have neuro follow up

## 2017-06-14 NOTE — PROGRESS NOTE ADULT - SUBJECTIVE AND OBJECTIVE BOX
Pt seen and examined at bedside    Subjective/objective: No acute events overnight. c/o LE weakness, unable to stand.   Denies SOB currently     Allergies:  No Known Allergies    Hospital Medications:   MEDICATIONS  (STANDING):  finasteride 5milliGRAM(s) Oral daily  amiodarone    Tablet 200milliGRAM(s) Oral daily  atorvastatin 20milliGRAM(s) Oral at bedtime  docusate sodium 100milliGRAM(s) Oral daily  midodrine 30milliGRAM(s) Oral every 8 hours  sevelamer hydrochloride 800milliGRAM(s) Oral three times a day with meals  levothyroxine 75MICROGram(s) Oral daily  insulin lispro (HumaLOG) corrective regimen sliding scale  SubCutaneous three times a day before meals  insulin lispro (HumaLOG) corrective regimen sliding scale  SubCutaneous at bedtime  dextrose 5%. 1000milliLiter(s) IV Continuous <Continuous>  dextrose 50% Injectable 12.5Gram(s) IV Push once  dextrose 50% Injectable 25Gram(s) IV Push once  dextrose 50% Injectable 25Gram(s) IV Push once  DOBUTamine Infusion 7MICROgram(s)/kG/Min IV Continuous <Continuous>  acetaminophen   Tablet. 650milliGRAM(s) Oral once  levETIRAcetam  IVPB 500milliGRAM(s) IV Intermittent daily  levETIRAcetam  IVPB 250milliGRAM(s) IV Intermittent daily  buDESOnide 160 MICROgram(s)/formoterol 4.5 MICROgram(s) Inhaler 2Puff(s) Inhalation two times a day  epoetin alba Injectable [id number](s) IV Push <User Schedule>    VITALS:    Vital Signs Last 24 Hrs  T(C): 36.1, Max: 36.6 (06-13 @ 21:44)  T(F): 97, Max: 97.9 (06-13 @ 21:44)  HR: 83 (79 - 92)  BP: 107/48 (95/51 - 117/56)  BP(mean): --  RR: 18 (18 - 18)  SpO2: 97% (95% - 100%)    I&O's Summary  I & Os for 24h ending 14 Jun 2017 07:00  =============================================  IN: 1449.6 ml / OUT: 2500 ml / NET: -1050.4 ml    I & Os for current day (as of 14 Jun 2017 17:06)  =============================================  IN: 824.2 ml / OUT: 0 ml / NET: 824.2 ml      PHYSICAL EXAM:  Constitutional: NAD  HEENT: anicteric sclera, oropharynx clear, MMM  Neck: No JVD  Respiratory: Bibasilar rales   Cardiovascular: S1, S2, RRR  Gastrointestinal: BS+, soft, NT/ND  Extremities: No cyanosis or clubbing. Trace LE peripheral edema b/l  Neurological: A/O x 2, no focal deficits  Psychiatric: Normal mood, normal affect  : No CVA tenderness. No rosa.   Skin: No rashes  Vascular Access: RIJ Tunneled cath     LABS:  06-14    129<L>  |  92<L>  |  21  ----------------------------<  83  4.1   |  22  |  4.01<H>    Ca    8.9      14 Jun 2017 09:00  Mg     1.9     06-14                          7.7    8.26  )-----------( 189      ( 14 Jun 2017 09:00 )             26.1       RADIOLOGY & ADDITIONAL STUDIES:

## 2017-06-14 NOTE — PROGRESS NOTE ADULT - PROBLEM SELECTOR PLAN 1
s/p HD yesterday, Rx sheet reviewed, net UF 2.1kg achieved, tolerated well  schedule for PUF today for UF goal 2-2.5kg and HD tomorrow for additional uf 2.5-3kg as tolerated  UF limited by hypotension.   c/w renal diet, fluid restriction 1L/day   Very poor prognosis considering comorbidities

## 2017-06-14 NOTE — PROGRESS NOTE ADULT - ASSESSMENT
61 yo M with acute on chronic severe systolic CHF (EF 19%), ESRD, DM2, A fib, HCAP  s/p acute resp distress with accidental choking on Dentures   have been doing reasonably OK at this time.

## 2017-06-14 NOTE — PROGRESS NOTE ADULT - ASSESSMENT
Patient is a 62y Male with congestive heart failure , End Stage Renal Disease on Dialysis ,pulm edema, pulm fibrosis, resp failure on BiPAP, s/p seizure, w/persistent hypotension,  1.	End Stage Renal Disease on Dialysis - k wnl, hypervolemic  2.	Hyponatremia- 2/2 dilutional, improving  3.	anemia of renal dis- not well controlled, hb 7.7, on max epogen w/HD. Tsat 47% adequate

## 2017-06-14 NOTE — CHART NOTE - NSCHARTNOTEFT_GEN_A_CORE
Called neuro consult resident on [phone] @ approximately 1240 for follow up regarding left side weakness. Will await follow up. Called neuro consult resident on [phone] @ approximately 1240 for follow up regarding left side weakness. Will await follow up.      Neuro will follow up in AM.

## 2017-06-15 DIAGNOSIS — R29.898 OTHER SYMPTOMS AND SIGNS INVOLVING THE MUSCULOSKELETAL SYSTEM: ICD-10-CM

## 2017-06-15 LAB
BUN SERPL-MCNC: 29 MG/DL — HIGH (ref 7–23)
CALCIUM SERPL-MCNC: 9.1 MG/DL — SIGNIFICANT CHANGE UP (ref 8.4–10.5)
CHLORIDE SERPL-SCNC: 90 MMOL/L — LOW (ref 98–107)
CO2 SERPL-SCNC: 28 MMOL/L — SIGNIFICANT CHANGE UP (ref 22–31)
CREAT SERPL-MCNC: 5.26 MG/DL — HIGH (ref 0.5–1.3)
GLUCOSE SERPL-MCNC: 120 MG/DL — HIGH (ref 70–99)
HCT VFR BLD CALC: 26.7 % — LOW (ref 39–50)
HGB BLD-MCNC: 8 G/DL — LOW (ref 13–17)
INR BLD: 2.87 — HIGH (ref 0.88–1.17)
MAGNESIUM SERPL-MCNC: 2 MG/DL — SIGNIFICANT CHANGE UP (ref 1.6–2.6)
MCHC RBC-ENTMCNC: 29.7 PG — SIGNIFICANT CHANGE UP (ref 27–34)
MCHC RBC-ENTMCNC: 30 % — LOW (ref 32–36)
MCV RBC AUTO: 99.3 FL — SIGNIFICANT CHANGE UP (ref 80–100)
PLATELET # BLD AUTO: 214 K/UL — SIGNIFICANT CHANGE UP (ref 150–400)
PMV BLD: 12.2 FL — SIGNIFICANT CHANGE UP (ref 7–13)
POTASSIUM SERPL-MCNC: 4 MMOL/L — SIGNIFICANT CHANGE UP (ref 3.5–5.3)
POTASSIUM SERPL-SCNC: 4 MMOL/L — SIGNIFICANT CHANGE UP (ref 3.5–5.3)
PROTHROM AB SERPL-ACNC: 32.8 SEC — HIGH (ref 9.8–13.1)
RBC # BLD: 2.69 M/UL — LOW (ref 4.2–5.8)
RBC # FLD: 23.8 % — HIGH (ref 10.3–14.5)
SODIUM SERPL-SCNC: 128 MMOL/L — LOW (ref 135–145)
WBC # BLD: 8.18 K/UL — SIGNIFICANT CHANGE UP (ref 3.8–10.5)
WBC # FLD AUTO: 8.18 K/UL — SIGNIFICANT CHANGE UP (ref 3.8–10.5)

## 2017-06-15 PROCEDURE — 70450 CT HEAD/BRAIN W/O DYE: CPT | Mod: 26

## 2017-06-15 RX ORDER — WARFARIN SODIUM 2.5 MG/1
1 TABLET ORAL ONCE
Qty: 0 | Refills: 0 | Status: COMPLETED | OUTPATIENT
Start: 2017-06-15 | End: 2017-06-15

## 2017-06-15 RX ADMIN — Medication 75 MICROGRAM(S): at 05:25

## 2017-06-15 RX ADMIN — BUDESONIDE AND FORMOTEROL FUMARATE DIHYDRATE 2 PUFF(S): 160; 4.5 AEROSOL RESPIRATORY (INHALATION) at 09:23

## 2017-06-15 RX ADMIN — AMIODARONE HYDROCHLORIDE 200 MILLIGRAM(S): 400 TABLET ORAL at 05:26

## 2017-06-15 RX ADMIN — Medication 100 MILLIGRAM(S): at 16:08

## 2017-06-15 RX ADMIN — LEVETIRACETAM 400 MILLIGRAM(S): 250 TABLET, FILM COATED ORAL at 16:45

## 2017-06-15 RX ADMIN — SEVELAMER CARBONATE 800 MILLIGRAM(S): 2400 POWDER, FOR SUSPENSION ORAL at 09:23

## 2017-06-15 RX ADMIN — Medication 20.85 MICROGRAM(S)/KG/MIN: at 15:17

## 2017-06-15 RX ADMIN — MIDODRINE HYDROCHLORIDE 30 MILLIGRAM(S): 2.5 TABLET ORAL at 05:25

## 2017-06-15 RX ADMIN — SEVELAMER CARBONATE 800 MILLIGRAM(S): 2400 POWDER, FOR SUSPENSION ORAL at 15:01

## 2017-06-15 RX ADMIN — ATORVASTATIN CALCIUM 20 MILLIGRAM(S): 80 TABLET, FILM COATED ORAL at 21:27

## 2017-06-15 RX ADMIN — LEVETIRACETAM 400 MILLIGRAM(S): 250 TABLET, FILM COATED ORAL at 16:07

## 2017-06-15 RX ADMIN — BUDESONIDE AND FORMOTEROL FUMARATE DIHYDRATE 2 PUFF(S): 160; 4.5 AEROSOL RESPIRATORY (INHALATION) at 21:28

## 2017-06-15 RX ADMIN — MIDODRINE HYDROCHLORIDE 30 MILLIGRAM(S): 2.5 TABLET ORAL at 16:07

## 2017-06-15 RX ADMIN — ERYTHROPOIETIN 20000 UNIT(S): 10000 INJECTION, SOLUTION INTRAVENOUS; SUBCUTANEOUS at 11:20

## 2017-06-15 RX ADMIN — MIDODRINE HYDROCHLORIDE 30 MILLIGRAM(S): 2.5 TABLET ORAL at 21:27

## 2017-06-15 RX ADMIN — SEVELAMER CARBONATE 800 MILLIGRAM(S): 2400 POWDER, FOR SUSPENSION ORAL at 17:58

## 2017-06-15 RX ADMIN — WARFARIN SODIUM 1 MILLIGRAM(S): 2.5 TABLET ORAL at 17:58

## 2017-06-15 NOTE — PROGRESS NOTE ADULT - SUBJECTIVE AND OBJECTIVE BOX
Patient is a 62y old  Male who presents with a chief complaint of sob (09 May 2017 15:32)    wife is at bedside       Any change in ROS:     MEDICATIONS  (STANDING):  finasteride 5milliGRAM(s) Oral daily  amiodarone    Tablet 200milliGRAM(s) Oral daily  atorvastatin 20milliGRAM(s) Oral at bedtime  docusate sodium 100milliGRAM(s) Oral daily  midodrine 30milliGRAM(s) Oral every 8 hours  sevelamer hydrochloride 800milliGRAM(s) Oral three times a day with meals  levothyroxine 75MICROGram(s) Oral daily  insulin lispro (HumaLOG) corrective regimen sliding scale  SubCutaneous three times a day before meals  insulin lispro (HumaLOG) corrective regimen sliding scale  SubCutaneous at bedtime  dextrose 5%. 1000milliLiter(s) IV Continuous <Continuous>  dextrose 50% Injectable 12.5Gram(s) IV Push once  dextrose 50% Injectable 25Gram(s) IV Push once  dextrose 50% Injectable 25Gram(s) IV Push once  DOBUTamine Infusion 7MICROgram(s)/kG/Min IV Continuous <Continuous>  acetaminophen   Tablet. 650milliGRAM(s) Oral once  levETIRAcetam  IVPB 500milliGRAM(s) IV Intermittent daily  levETIRAcetam  IVPB 250milliGRAM(s) IV Intermittent daily  buDESOnide 160 MICROgram(s)/formoterol 4.5 MICROgram(s) Inhaler 2Puff(s) Inhalation two times a day  epoetin alba Injectable [id number](s) IV Push <User Schedule>  warfarin 1milliGRAM(s) Oral once    MEDICATIONS  (PRN):  dextrose Gel 1Dose(s) Oral once PRN Blood Glucose LESS THAN 70 milliGRAM(s)/deciliter  glucagon  Injectable 1milliGRAM(s) IntraMuscular once PRN Glucose LESS THAN 70 milligrams/deciliter  acetaminophen   Tablet 650milliGRAM(s) Oral every 6 hours PRN For Temp greater than 38 C (100.4 F)  acetaminophen    Suspension. 650milliGRAM(s) Oral every 6 hours PRN Moderate Pain (4 - 6)  artificial tears (preservative free) Ophthalmic Solution 1Drop(s) Both EYES three times a day PRN Dry Eyes  sodium chloride 0.65% Nasal 1Spray(s) Both Nostrils four times a day PRN Nasal Congestion    Vital Signs Last 24 Hrs  T(C): 35.6, Max: 36.9 (06-14 @ 21:39)  T(F): 96.1, Max: 98.4 (06-14 @ 21:39)  HR: 78 (76 - 83)  BP: 92/50 (92/50 - 124/63)  BP(mean): --  RR: 18 (18 - 20)  SpO2: 97% (96% - 100%)    I&O's Summary  I & Os for 24h ending 15 Nilse 2017 07:00  =============================================  IN: 1286.6 ml / OUT: 1500 ml / NET: -213.4 ml    I & Os for current day (as of 15 Niles 2017 17:34)  =============================================  IN: 708 ml / OUT: 2700 ml / NET: -1992 ml        Physical Exam:   GENERAL: NAD, well-groomed, well-developed  HEENT: BELL/   Atraumatic, Normocephalic  ENMT: No tonsillar erythema, exudates, or enlargement; Moist mucous membranes, Good dentition, No lesions  NECK: Supple, No JVD, Normal thyroid  CHEST/LUNG: Clear to percussion bilaterally; No rales, rhonchi, wheezing, or rubs  CVS: Regular rate and rhythm; No murmurs, rubs, or gallops  GI: : Soft, Nontender, Nondistended; Bowel sounds present  NERVOUS SYSTEM:  Alert & Oriented X3, Good concentration; Motor Strength 5/5 B/L upper and lower extremities; DTRs 2+ intact and symmetric  EXTREMITIES:  2+ Peripheral Pulses, No clubbing, cyanosis, or edema  LYMPH: No lymphadenopathy noted  SKIN: No rashes or lesions  ENDOCRINOLOGY: No Thyromegaly  PSYCH: Appropriate    Labs:                        8.0    8.18  )-----------( 214      ( 15 Niles 2017 10:50 )             26.7                         7.7    8.26  )-----------( 189      ( 14 Jun 2017 09:00 )             26.1                         8.1    9.88  )-----------( 190      ( 13 Jun 2017 06:06 )             26.7                         7.2    9.23  )-----------( 171      ( 12 Jun 2017 06:30 )             24.9     06-15    128<L>  |  90<L>  |  29<H>  ----------------------------<  120<H>  4.0   |  28  |  5.26<H>  06-14    129<L>  |  92<L>  |  21  ----------------------------<  83  4.1   |  22  |  4.01<H>  06-13    127<L>  |  87<L>  |  34<H>  ----------------------------<  115<H>  4.5   |  23  |  6.03<H>  06-12    126<L>  |  89<L>  |  24<H>  ----------------------------<  309<H>  4.2   |  26  |  4.59<H>    Ca    9.1      15 Niles 2017 10:51  Ca    8.9      14 Jun 2017 09:00  Mg     2.0     06-15  Mg     1.9     06-14      CAPILLARY BLOOD GLUCOSE  141 (15 Niles 2017 16:58)  131 (15 Niles 2017 11:44)  114 (15 Niles 2017 08:25)  150 (14 Jun 2017 22:26)        PT/INR - ( 15 Niles 2017 10:50 )   PT: 32.8 SEC;   INR: 2.87                Studies  Chest X-RAY  CT SCAN Chest   IMPRESSION:    Extensive pulmonary fibrosis.    Evidence of superimposed mild small airway disease.  Venous Dopplers: LE:   CT Abdomen  Others

## 2017-06-15 NOTE — PROGRESS NOTE ADULT - ASSESSMENT
61 yo Male  with acute on chronic severe systolic CHF (EF 19%), ESRD, DM2, A fib, HCAP  - HCAP - resolved  - Pulmonary fibrosis, COPD - c/w inhalers, no benefit of steroids  - Acute on chronic systolic CHF - continue inotropic support as per Cardio, HD for maximum fluid removal  - ESRD - continue HD as per Renal, continue Midodrine.  - A fib - continue Amio, follow INR,   - DM2 - controlled, continue with ISS  - Possible planning for SERAFIN  - LLE decreased ROM - MRI not possible in view of dobutamine drip, will address with Neuro

## 2017-06-15 NOTE — PROGRESS NOTE ADULT - ATTENDING COMMENTS
Seen and examined on rounds.  RUE/RLE weakness beginning in the last week in the setting of pain.  In light of Afib, low CHF, would check an MRI brain w/o to r/o infarct.  Additionally complaining of low back and right leg weakness, check MRI L spine w/o, to evaluate for lumbar radiculopathy.

## 2017-06-15 NOTE — PROGRESS NOTE ADULT - SUBJECTIVE AND OBJECTIVE BOX
Subjective:Interval History - No events overnight    Objective:   Vital Signs Last 24 Hrs  T(C): 36.6, Max: 36.9 (06-14 @ 21:39)  T(F): 97.8, Max: 98.4 (06-14 @ 21:39)  HR: 79 (63 - 84)  BP: 97/56 (96/56 - 109/64)  BP(mean): --  RR: 18 (18 - 20)  SpO2: 98% (96% - 100%)    General Exam:   General appearance: No acute distress                   Neurological Exam:  Mental Status: Orientated to self, date and place.  Attention intact.  No dysarthria, aphasia or neglect.      Cranial Nerves: CN I - not tested.  PERRL, EOMI, VFF, no nystagmus or diplopia.  No APD.  Fundi not visualized bilaterally.  CN V1-3 intact to light touch and pinprick.  No facial asymmetry.  Hearing intact to finger rub bilaterally.  Tongue, uvula and palate midline.  Sternocleidomastoid and Trapezius intact bilaterally.    Motor:   Tone: normal.                  Strength: 4/5 on LLE  Pronator drift: none                 Dysmeria: None to finger-nose-finger or heel-shin-heel  No truncal ataxia.    Tremor: No resting, postural or action tremor.  No myoclonus.    Sensation: intact to light touch, pinprick, vibration and proprioception    Deep Tendon Reflexes: absent reflexes bilateral biceps, triceps, brachioradialis, knee and ankle  Toes flexor bilaterally    Gait: normal and stable.      Other:    06-14    129<L>  |  92<L>  |  21  ----------------------------<  83  4.1   |  22  |  4.01<H>    Ca    8.9      14 Jun 2017 09:00  Mg     1.9     06-14                              7.7    8.26  )-----------( 189      ( 14 Jun 2017 09:00 )             26.1         MEDICATIONS  (STANDING):  finasteride 5milliGRAM(s) Oral daily  amiodarone    Tablet 200milliGRAM(s) Oral daily  atorvastatin 20milliGRAM(s) Oral at bedtime  docusate sodium 100milliGRAM(s) Oral daily  midodrine 30milliGRAM(s) Oral every 8 hours  sevelamer hydrochloride 800milliGRAM(s) Oral three times a day with meals  levothyroxine 75MICROGram(s) Oral daily  insulin lispro (HumaLOG) corrective regimen sliding scale  SubCutaneous three times a day before meals  insulin lispro (HumaLOG) corrective regimen sliding scale  SubCutaneous at bedtime  dextrose 5%. 1000milliLiter(s) IV Continuous <Continuous>  dextrose 50% Injectable 12.5Gram(s) IV Push once  dextrose 50% Injectable 25Gram(s) IV Push once  dextrose 50% Injectable 25Gram(s) IV Push once  DOBUTamine Infusion 7MICROgram(s)/kG/Min IV Continuous <Continuous>  acetaminophen   Tablet. 650milliGRAM(s) Oral once  levETIRAcetam  IVPB 500milliGRAM(s) IV Intermittent daily  levETIRAcetam  IVPB 250milliGRAM(s) IV Intermittent daily  buDESOnide 160 MICROgram(s)/formoterol 4.5 MICROgram(s) Inhaler 2Puff(s) Inhalation two times a day  epoetin alba Injectable [id number](s) IV Push <User Schedule>    MEDICATIONS  (PRN):  dextrose Gel 1Dose(s) Oral once PRN Blood Glucose LESS THAN 70 milliGRAM(s)/deciliter  glucagon  Injectable 1milliGRAM(s) IntraMuscular once PRN Glucose LESS THAN 70 milligrams/deciliter  acetaminophen   Tablet 650milliGRAM(s) Oral every 6 hours PRN For Temp greater than 38 C (100.4 F)  acetaminophen    Suspension. 650milliGRAM(s) Oral every 6 hours PRN Moderate Pain (4 - 6)  artificial tears (preservative free) Ophthalmic Solution 1Drop(s) Both EYES three times a day PRN Dry Eyes  sodium chloride 0.65% Nasal 1Spray(s) Both Nostrils four times a day PRN Nasal Congestion

## 2017-06-15 NOTE — PROGRESS NOTE ADULT - SUBJECTIVE AND OBJECTIVE BOX
Pt seen and examined at bedside  No new complaints. Respiratory status stable.   Uneventful HD this AM.     Allergies:  No Known Allergies    Hospital Medications:   MEDICATIONS  (STANDING):  finasteride 5milliGRAM(s) Oral daily  amiodarone    Tablet 200milliGRAM(s) Oral daily  atorvastatin 20milliGRAM(s) Oral at bedtime  docusate sodium 100milliGRAM(s) Oral daily  midodrine 30milliGRAM(s) Oral every 8 hours  sevelamer hydrochloride 800milliGRAM(s) Oral three times a day with meals  levothyroxine 75MICROGram(s) Oral daily  insulin lispro (HumaLOG) corrective regimen sliding scale  SubCutaneous three times a day before meals  insulin lispro (HumaLOG) corrective regimen sliding scale  SubCutaneous at bedtime  dextrose 5%. 1000milliLiter(s) IV Continuous <Continuous>  dextrose 50% Injectable 12.5Gram(s) IV Push once  dextrose 50% Injectable 25Gram(s) IV Push once  dextrose 50% Injectable 25Gram(s) IV Push once  DOBUTamine Infusion 7MICROgram(s)/kG/Min IV Continuous <Continuous>  acetaminophen   Tablet. 650milliGRAM(s) Oral once  levETIRAcetam  IVPB 500milliGRAM(s) IV Intermittent daily  levETIRAcetam  IVPB 250milliGRAM(s) IV Intermittent daily  buDESOnide 160 MICROgram(s)/formoterol 4.5 MICROgram(s) Inhaler 2Puff(s) Inhalation two times a day  epoetin alba Injectable 52564Ozrw(s) IV Push <User Schedule>  warfarin 1milliGRAM(s) Oral once    VITALS:  T(F): 96.1, Max: 98.4 (06-14 @ 21:39)  HR: 78  BP: 92/50  RR: 18  SpO2: 97%  Wt(kg): --  I & Os for 24h ending 06-15 @ 07:00  =============================================  IN: 1286.6 ml / OUT: 1500 ml / NET: -213.4 ml    I & Os for current day (as of 06-15 @ 16:41)  =============================================  IN: 708 ml / OUT: 2700 ml / NET: -1992 ml      PHYSICAL EXAM:  Constitutional: NAD  HEENT: anicteric sclera, oropharynx clear, MMM  Neck: No JVD  Respiratory: Bibasilar rales   Cardiovascular: S1, S2, RRR  Gastrointestinal: BS+, soft, NT/ND  Extremities: No cyanosis or clubbing. Trace LE peripheral edema b/l  Neurological: A/O x 2, no focal deficits  Psychiatric: Normal mood, normal affect  : No CVA tenderness. No rosa.   Skin: No rashes  Vascular Access: RIJ Tunneled cath     LABS:  06-15    128<L>  |  90<L>  |  29<H>  ----------------------------<  120<H>  4.0   |  28  |  5.26<H>    Ca    9.1      15 Niles 2017 10:51  Mg     2.0     06-15      Creatinine Trend: 5.26 <--, 4.01 <--, 6.03 <--, 4.59 <--, 3.54 <--, 5.06 <--, 3.60 <--                        8.0    8.18  )-----------( 214      ( 15 Niles 2017 10:50 )             26.7     Urine Studies:      RADIOLOGY & ADDITIONAL STUDIES:

## 2017-06-15 NOTE — PROGRESS NOTE ADULT - PROBLEM SELECTOR PLAN 1
Pt tolerated HD this AM with nearly 2.5kg UF achieved. Flowsheet reviewed.    Will plan for next HD 6/17, unless pt develops respiratory distress and volume overload. THen will arrange for PUF tomorrow.   c/w renal diet, fluid restriction 1L/day   Very poor prognosis considering comorbidities

## 2017-06-15 NOTE — PROGRESS NOTE ADULT - ASSESSMENT
Patient is a 62y Male with congestive heart failure , End Stage Renal Disease on Dialysis ,pulm edema, pulm fibrosis, resp failure on BiPAP, s/p seizure, w/persistent hypotension,  1.	End Stage Renal Disease on Dialysis - k wnl, hypervolemic  2.	Hyponatremia- 2/2 dilutional, improving  3.	anemia of renal dis- not well controlled,

## 2017-06-15 NOTE — PROGRESS NOTE ADULT - SUBJECTIVE AND OBJECTIVE BOX
CHIEF COMPLAINT:Patient is a 62y old  Male who presents with a chief complaint of sob (09 May 2017 15:32)    	  Interval history:      Allergies:  No Known Allergies      PAST MEDICAL & SURGICAL HISTORY:  Chronic hypotension  AF (atrial fibrillation)  COPD (chronic obstructive pulmonary disease): 4L home O2  HLD (hyperlipidemia)  DM (diabetes mellitus)  ESRD (end stage renal disease) on dialysis  BPH (benign prostatic hypertrophy)  Myocardial infarction: 10/2011  Chronic renal insufficiency  Gout  Dyslipidemia  Diabetes mellitus  CHF (congestive heart failure)  AICD (automatic cardioverter/defibrillator) present: Biotronic - placed 9/11/09  H/O coronary angiogram      FAMILY HISTORY:  No pertinent family history in first degree relatives      REVIEW OF SYSTEMS:  CONSTITUTIONAL: No fever, weight loss, or fatigue  EYES: No eye pain, visual disturbances, or discharge  NECK: No pain or stiffness  RESPIRATORY: No cough or wheezing, shortness of breath at baseline  CARDIOVASCULAR: No chest pain, palpitations, dizziness, or leg swelling  GASTROINTESTINAL: No abdominal or epigastric pain. No nausea, vomiting, diarrhea or constipation  GENITOURINARY: No dysuria, urinary frequency or urgency, no hematuria  NEUROLOGICAL: No headaches, memory loss, loss of strength, numbness, or tremors  SKIN: No itching, burning, rashes, or lesions   MUSCULOSKELETAL: No joint pain or swelling; Left hip pain    Medications:  MEDICATIONS  (STANDING):  finasteride 5milliGRAM(s) Oral daily  amiodarone    Tablet 200milliGRAM(s) Oral daily  atorvastatin 20milliGRAM(s) Oral at bedtime  docusate sodium 100milliGRAM(s) Oral daily  midodrine 30milliGRAM(s) Oral every 8 hours  sevelamer hydrochloride 800milliGRAM(s) Oral three times a day with meals  levothyroxine 75MICROGram(s) Oral daily  insulin lispro (HumaLOG) corrective regimen sliding scale  SubCutaneous three times a day before meals  insulin lispro (HumaLOG) corrective regimen sliding scale  SubCutaneous at bedtime  dextrose 5%. 1000milliLiter(s) IV Continuous <Continuous>  dextrose 50% Injectable 12.5Gram(s) IV Push once  dextrose 50% Injectable 25Gram(s) IV Push once  dextrose 50% Injectable 25Gram(s) IV Push once  DOBUTamine Infusion 7MICROgram(s)/kG/Min IV Continuous <Continuous>  acetaminophen   Tablet. 650milliGRAM(s) Oral once  levETIRAcetam  IVPB 500milliGRAM(s) IV Intermittent daily  levETIRAcetam  IVPB 250milliGRAM(s) IV Intermittent daily  buDESOnide 160 MICROgram(s)/formoterol 4.5 MICROgram(s) Inhaler 2Puff(s) Inhalation two times a day  epoetin alba Injectable 29466Tqai(s) IV Push <User Schedule>  warfarin 1milliGRAM(s) Oral once    MEDICATIONS  (PRN):  dextrose Gel 1Dose(s) Oral once PRN Blood Glucose LESS THAN 70 milliGRAM(s)/deciliter  glucagon  Injectable 1milliGRAM(s) IntraMuscular once PRN Glucose LESS THAN 70 milligrams/deciliter  acetaminophen   Tablet 650milliGRAM(s) Oral every 6 hours PRN For Temp greater than 38 C (100.4 F)  acetaminophen    Suspension. 650milliGRAM(s) Oral every 6 hours PRN Moderate Pain (4 - 6)  artificial tears (preservative free) Ophthalmic Solution 1Drop(s) Both EYES three times a day PRN Dry Eyes  sodium chloride 0.65% Nasal 1Spray(s) Both Nostrils four times a day PRN Nasal Congestion    	    PHYSICAL EXAM:  T(C): 35.6, Max: 36.9 (06-14 @ 21:39)  HR: 78 (76 - 83)  BP: 92/50 (92/50 - 124/63)  RR: 18 (18 - 20)  SpO2: 97% (96% - 100%)  Wt(kg): --  I&O's Summary  I & Os for 24h ending 15 Niles 2017 07:00  =============================================  IN: 1286.6 ml / OUT: 1500 ml / NET: -213.4 ml    I & Os for current day (as of 15 Niles 2017 17:46)  =============================================  IN: 708 ml / OUT: 2700 ml / NET: -1992 ml    Appearance: Normal	  HEENT:   NCAT, PERRL, EOMI	  Lymphatic: No lymphadenopathy  Cardiovascular: Normal S1 S2, RRR  Respiratory: Lungs clear to auscultation BL  Psychiatry: A & O x 3, Mood & affect appropriate  Gastrointestinal:  Soft, Non-tender, + BS  Skin: No rashes, No ecchymoses, No cyanosis	  Neurologic: Non-focal  Extremities: LLE decreased ROM    	  LABS:	 	    CARDIAC MARKERS:                                8.0    8.18  )-----------( 214      ( 15 Niles 2017 10:50 )             26.7     06-15    128<L>  |  90<L>  |  29<H>  ----------------------------<  120<H>  4.0   |  28  |  5.26<H>    Ca    9.1      15 Niles 2017 10:51  Mg     2.0     06-15      proBNP:   Lipid Profile:   HgA1c:   TSH:

## 2017-06-15 NOTE — PROGRESS NOTE ADULT - ASSESSMENT
63 yo male now having LLE weakness after cardiac cath 1 week ago. Cannot r/o stroke vs lower back pain

## 2017-06-15 NOTE — PROGRESS NOTE ADULT - ATTENDING COMMENTS
Spoken to wife again at bedside, she says the steroids were discontinued by the pulmonologist as it was not effective, and then cellcept was discontinued as he had developed side effects  and hence patient is not on any med for pulm fibrosis: HIs overall prognosis is pretty poor: He has been on dobutamine drip

## 2017-06-16 LAB
BUN SERPL-MCNC: 19 MG/DL — SIGNIFICANT CHANGE UP (ref 7–23)
CALCIUM SERPL-MCNC: 8.3 MG/DL — LOW (ref 8.4–10.5)
CHLORIDE SERPL-SCNC: 94 MMOL/L — LOW (ref 98–107)
CO2 SERPL-SCNC: 26 MMOL/L — SIGNIFICANT CHANGE UP (ref 22–31)
CREAT SERPL-MCNC: 3.8 MG/DL — HIGH (ref 0.5–1.3)
GLUCOSE SERPL-MCNC: 125 MG/DL — HIGH (ref 70–99)
HCT VFR BLD CALC: 27.1 % — LOW (ref 39–50)
HGB BLD-MCNC: 7.9 G/DL — LOW (ref 13–17)
INR BLD: 2.27 — HIGH (ref 0.88–1.17)
MCHC RBC-ENTMCNC: 29.2 % — LOW (ref 32–36)
MCHC RBC-ENTMCNC: 29.3 PG — SIGNIFICANT CHANGE UP (ref 27–34)
MCV RBC AUTO: 100.4 FL — HIGH (ref 80–100)
PLATELET # BLD AUTO: 221 K/UL — SIGNIFICANT CHANGE UP (ref 150–400)
PMV BLD: 12.3 FL — SIGNIFICANT CHANGE UP (ref 7–13)
POTASSIUM SERPL-MCNC: 4 MMOL/L — SIGNIFICANT CHANGE UP (ref 3.5–5.3)
POTASSIUM SERPL-SCNC: 4 MMOL/L — SIGNIFICANT CHANGE UP (ref 3.5–5.3)
PROTHROM AB SERPL-ACNC: 25.9 SEC — HIGH (ref 9.8–13.1)
RBC # BLD: 2.7 M/UL — LOW (ref 4.2–5.8)
RBC # FLD: 24.2 % — HIGH (ref 10.3–14.5)
SODIUM SERPL-SCNC: 134 MMOL/L — LOW (ref 135–145)
WBC # BLD: 9.58 K/UL — SIGNIFICANT CHANGE UP (ref 3.8–10.5)
WBC # FLD AUTO: 9.58 K/UL — SIGNIFICANT CHANGE UP (ref 3.8–10.5)

## 2017-06-16 PROCEDURE — 72131 CT LUMBAR SPINE W/O DYE: CPT | Mod: 26

## 2017-06-16 RX ADMIN — AMIODARONE HYDROCHLORIDE 200 MILLIGRAM(S): 400 TABLET ORAL at 05:21

## 2017-06-16 RX ADMIN — ATORVASTATIN CALCIUM 20 MILLIGRAM(S): 80 TABLET, FILM COATED ORAL at 22:57

## 2017-06-16 RX ADMIN — Medication 100 MILLIGRAM(S): at 13:18

## 2017-06-16 RX ADMIN — MIDODRINE HYDROCHLORIDE 30 MILLIGRAM(S): 2.5 TABLET ORAL at 22:57

## 2017-06-16 RX ADMIN — BUDESONIDE AND FORMOTEROL FUMARATE DIHYDRATE 2 PUFF(S): 160; 4.5 AEROSOL RESPIRATORY (INHALATION) at 22:57

## 2017-06-16 RX ADMIN — MIDODRINE HYDROCHLORIDE 30 MILLIGRAM(S): 2.5 TABLET ORAL at 05:21

## 2017-06-16 RX ADMIN — LEVETIRACETAM 400 MILLIGRAM(S): 250 TABLET, FILM COATED ORAL at 14:05

## 2017-06-16 RX ADMIN — BUDESONIDE AND FORMOTEROL FUMARATE DIHYDRATE 2 PUFF(S): 160; 4.5 AEROSOL RESPIRATORY (INHALATION) at 09:13

## 2017-06-16 RX ADMIN — SEVELAMER CARBONATE 800 MILLIGRAM(S): 2400 POWDER, FOR SUSPENSION ORAL at 13:18

## 2017-06-16 RX ADMIN — SEVELAMER CARBONATE 800 MILLIGRAM(S): 2400 POWDER, FOR SUSPENSION ORAL at 18:45

## 2017-06-16 RX ADMIN — SEVELAMER CARBONATE 800 MILLIGRAM(S): 2400 POWDER, FOR SUSPENSION ORAL at 09:13

## 2017-06-16 RX ADMIN — FINASTERIDE 5 MILLIGRAM(S): 5 TABLET, FILM COATED ORAL at 13:18

## 2017-06-16 RX ADMIN — Medication: at 18:44

## 2017-06-16 RX ADMIN — Medication 20.85 MICROGRAM(S)/KG/MIN: at 18:09

## 2017-06-16 RX ADMIN — MIDODRINE HYDROCHLORIDE 30 MILLIGRAM(S): 2.5 TABLET ORAL at 15:06

## 2017-06-16 RX ADMIN — Medication 75 MICROGRAM(S): at 05:21

## 2017-06-16 NOTE — PROGRESS NOTE ADULT - ASSESSMENT
Patient is a 62y Male with congestive heart failure , End Stage Renal Disease on Dialysis ,pulm edema, pulm fibrosis, resp failure on BiPAP, s/p seizure, w/persistent hypotension,  1.	End Stage Renal Disease on Dialysis - k wnl, hypervolemic. Pt tolerated HD yesterday with net 2.1kg UF removed. Flowsheet reviewed.    2.	Hyponatremia- 2/2 dilutional, improved  3.	anemia of renal dis- not well controlled,

## 2017-06-16 NOTE — PROGRESS NOTE ADULT - PROBLEM SELECTOR PLAN 1
on 4.5 liters of oxygen  DISCUSSED WITH WIFE AGAIN ABOUT FAILURE OF STEROIDS TO WORK AND SIDE EFFECTS OF CELLCEPT

## 2017-06-16 NOTE — PROGRESS NOTE ADULT - ASSESSMENT
63 yo male now having LLE weakness after cardiac cath 1 week ago. Cannot r/o stroke vs lower back pain, possible spinal etiology (hematoma). Limited imaging options given ICD placement.

## 2017-06-16 NOTE — CONSULT NOTE ADULT - SUBJECTIVE AND OBJECTIVE BOX
GENERAL SURGERY CONSULT NOTE    General surgery (B team) [id number]    HPI:  63 y/o male with a PMHx of atrial fibrillation on Coumadin, admitted on 5/9/16 s/p syncopal episode. On the day of admission, the wife was able to lower him to the ground to avoid any trauma. On 5/16/16 he underwent a cardiac catheterization as part of the syncopal work up. After the cath, the pt began complaining of Left leg pain radiating to the low back. He also noted unsteadiness, increased pain and weakness with flexing or extending his hip. He denies fevers/chills/N/V.  Today, he underwent a CT lumbar to work up his lower back pain, which demonstrated bilateral ileopsoas hematomas.       PAST MEDICAL & SURGICAL HISTORY:  Chronic hypotension  AF (atrial fibrillation)  COPD (chronic obstructive pulmonary disease): 4L home O2  HLD (hyperlipidemia)  DM (diabetes mellitus)  ESRD (end stage renal disease) on dialysis  BPH (benign prostatic hypertrophy)  Myocardial infarction: 10/2011  Chronic renal insufficiency  Gout  Dyslipidemia  CHF (congestive heart failure)  AICD (automatic cardioverter/defibrillator) present: Biotronic - placed 9/11/09  H/O coronary angiogram      REVIEW OF SYSTEMS  Systemic:	[ ] Fever	[ ] Chills	[ ] Night sweats    [ ] Fatigue	[ ] Other  [] Cardiovascular:  [] Pulmonary:  [] Renal/Urologic:  [] Gastrointestinal:   [] Metabolic:  [x] Neurologic: left leg weakness  [] Hematologic:  [] ENT:  [] Ophthalmologic:  [x] Musculoskeletal: Left leg and back pain    MEDICATIONS  (STANDING):  finasteride 5milliGRAM(s) Oral daily  amiodarone    Tablet 200milliGRAM(s) Oral daily  atorvastatin 20milliGRAM(s) Oral at bedtime  docusate sodium 100milliGRAM(s) Oral daily  midodrine 30milliGRAM(s) Oral every 8 hours  sevelamer hydrochloride 800milliGRAM(s) Oral three times a day with meals  levothyroxine 75MICROGram(s) Oral daily  insulin lispro (HumaLOG) corrective regimen sliding scale  SubCutaneous three times a day before meals  insulin lispro (HumaLOG) corrective regimen sliding scale  SubCutaneous at bedtime  dextrose 5%. 1000milliLiter(s) IV Continuous <Continuous>  dextrose 50% Injectable 12.5Gram(s) IV Push once  dextrose 50% Injectable 25Gram(s) IV Push once  dextrose 50% Injectable 25Gram(s) IV Push once  DOBUTamine Infusion 7MICROgram(s)/kG/Min IV Continuous <Continuous>  acetaminophen   Tablet. 650milliGRAM(s) Oral once  levETIRAcetam  IVPB 500milliGRAM(s) IV Intermittent daily  levETIRAcetam  IVPB 250milliGRAM(s) IV Intermittent daily  buDESOnide 160 MICROgram(s)/formoterol 4.5 MICROgram(s) Inhaler 2Puff(s) Inhalation two times a day  epoetin alba Injectable [id number](s) IV Push <User Schedule>    MEDICATIONS  (PRN):  dextrose Gel 1Dose(s) Oral once PRN Blood Glucose LESS THAN 70 milliGRAM(s)/deciliter  glucagon  Injectable 1milliGRAM(s) IntraMuscular once PRN Glucose LESS THAN 70 milligrams/deciliter  acetaminophen   Tablet 650milliGRAM(s) Oral every 6 hours PRN For Temp greater than 38 C (100.4 F)  acetaminophen    Suspension. 650milliGRAM(s) Oral every 6 hours PRN Moderate Pain (4 - 6)  artificial tears (preservative free) Ophthalmic Solution 1Drop(s) Both EYES three times a day PRN Dry Eyes  sodium chloride 0.65% Nasal 1Spray(s) Both Nostrils four times a day PRN Nasal Congestion      Allergies  No Known Allergies    Intolerances        SOCIAL HISTORY:  The pt lives at home with his wife, who is his primary care-giver.     FAMILY HISTORY:  No pertinent family history in first degree relatives      Vital Signs Last 24 Hrs  T(C): 36.9, Max: 37.2 (06-16 @ 00:00)  T(F): 98.5, Max: 98.9 (06-16 @ 00:00)  HR: 83 (61 - 88)  BP: 113/54 (90/50 - 120/98)  BP(mean): --  RR: 18 (18 - 18)  SpO2: 95% (95% - 98%)    PHYSICAL EXAM:    Constitutional: NAD    Eyes: anicteric    ENMT: no rhinorrhea    Neck: supple    Respiratory: Clear bilaterally, respirations not labored, no retractions    Cardiovascular: RRR, NL S1S2    Gastrointestinal: Soft, ND, NT    Extremities: No deformities, nontender thigh compartments. Increased back pain with active hip flexion.     Vascular: Ext. warm, normal cap refill    Neurological: sensation and motor intact to all extremities    Skin: warm, dry, no rash    Lymph Nodes: no palpable adenopathy          LABS:                        7.9    9.58  )-----------( 221      ( 16 Jun 2017 06:30 )             27.1     06-16    134<L>  |  94<L>  |  19  ----------------------------<  125<H>  4.0   |  26  |  3.80<H>    Ca    8.3<L>      16 Jun 2017 06:30  Mg     2.0     06-15      PT/INR - ( 16 Jun 2017 06:30 )   PT: 25.9 SEC;   INR: 2.27          RADIOLOGY & ADDITIONAL STUDIES:  EXAM:  CT LUMBAR SPINE    PROCEDURE DATE:  Jun 16 2017   IMPRESSION:  No lumbosacral fracture. No epidural hematoma.  Bilateral iliopsoas intramuscular hematomas, incompletely visualized.

## 2017-06-16 NOTE — CONSULT NOTE ADULT - ASSESSMENT
61yo M on coumadin for Afib now with bilateral ileopsoas hematomas. No evidence of infection. No evidence of active ongoing bleeding, however H/H has been slowly downtrending over the past few weeks.     - Recommend holding A/C if safe from cardiology/primary team perspective.   - No acute surgical intervention. Hematomas will resorb with time.  - OK to continue gentle PT/OOB  - Pain control.   - D/W Dr. Cheung    General surgery (B team) [id number] 61yo M on coumadin for Afib now with bilateral ileopsoas hematomas. No evidence of infection. No evidence of active ongoing bleeding, however H/H has been slowly downtrending over the past few weeks.     - Recommend holding A/C if safe from cardiology/primary team perspective.   - No acute surgical intervention. Hematomas will resorb with time. Discussed with Dr. Cheung, consider IR consult for evaluation for angioembolization if concerns for bleeding.   - OK to continue gentle PT/OOB  - Pain control.   - D/W Dr. Cheung    General surgery (B team) [id number]

## 2017-06-16 NOTE — PROGRESS NOTE ADULT - SUBJECTIVE AND OBJECTIVE BOX
Pt seen and examined at bedside  No new complaints. Respiratory status stable. overnight on BiPAP, now on NC02  Uneventful HD yesterday    Allergies:  No Known Allergies    Hospital Medications:   MEDICATIONS  (STANDING):  finasteride 5milliGRAM(s) Oral daily  amiodarone    Tablet 200milliGRAM(s) Oral daily  atorvastatin 20milliGRAM(s) Oral at bedtime  docusate sodium 100milliGRAM(s) Oral daily  midodrine 30milliGRAM(s) Oral every 8 hours  sevelamer hydrochloride 800milliGRAM(s) Oral three times a day with meals  levothyroxine 75MICROGram(s) Oral daily  insulin lispro (HumaLOG) corrective regimen sliding scale  SubCutaneous three times a day before meals  insulin lispro (HumaLOG) corrective regimen sliding scale  SubCutaneous at bedtime  dextrose 5%. 1000milliLiter(s) IV Continuous <Continuous>  dextrose 50% Injectable 12.5Gram(s) IV Push once  dextrose 50% Injectable 25Gram(s) IV Push once  dextrose 50% Injectable 25Gram(s) IV Push once  DOBUTamine Infusion 7MICROgram(s)/kG/Min IV Continuous <Continuous>  acetaminophen   Tablet. 650milliGRAM(s) Oral once  levETIRAcetam  IVPB 500milliGRAM(s) IV Intermittent daily  levETIRAcetam  IVPB 250milliGRAM(s) IV Intermittent daily  buDESOnide 160 MICROgram(s)/formoterol 4.5 MICROgram(s) Inhaler 2Puff(s) Inhalation two times a day  epoetin alba Injectable 92977Lyiw(s) IV Push <User Schedule>  warfarin 1milliGRAM(s) Oral once    VITALS:  Vital Signs Last 24 Hrs  T(C): 36.8, Max: 37.2 (06-16 @ 00:00)  T(F): 98.3, Max: 98.9 (06-16 @ 00:00)  HR: 79 (76 - 94)  BP: 115/54 (92/50 - 124/63)  BP(mean): --  RR: 18 (18 - 18)  SpO2: 97% (95% - 98%)    I&O's Summary  I & Os for 24h ending 16 Jun 2017 07:00  =============================================  IN: 1049.6 ml / OUT: 2900 ml / NET: -1850.4 ml    I & Os for current day (as of 16 Jun 2017 14:42)  =============================================  IN: 360 ml / OUT: 0 ml / NET: 360 ml      PHYSICAL EXAM:  Constitutional: NAD  HEENT: anicteric sclera, oropharynx clear, MMM  Neck: No JVD  Respiratory: Bibasilar rales   Cardiovascular: S1, S2, RRR  Gastrointestinal: BS+, soft, NT/ND  Extremities: No cyanosis or clubbing. Trace LE peripheral edema b/l  Neurological: A/O x 2, no focal deficits  Psychiatric: Normal mood, normal affect  : No CVA tenderness. No rosa.   Skin: No rashes  Vascular Access: RIJ Tunneled cath     LABS:  06-16    134<L>  |  94<L>  |  19  ----------------------------<  125<H>  4.0   |  26  |  3.80<H>    Ca    8.3<L>      16 Jun 2017 06:30  Mg     2.0     06-15                          7.9    9.58  )-----------( 221      ( 16 Jun 2017 06:30 )             27.1       RADIOLOGY & ADDITIONAL STUDIES:

## 2017-06-16 NOTE — PROGRESS NOTE ADULT - SUBJECTIVE AND OBJECTIVE BOX
Patient is a 62y old  Male who presents with a chief complaint of sob (09 May 2017 15:32)    pt is on 4.5 l/mt of oxygen today  He has been stable but remains critically ill and on Bipap at night while sleeping   Bipap : 10 /5 40 %      Any change in ROS:     MEDICATIONS  (STANDING):  finasteride 5milliGRAM(s) Oral daily  amiodarone    Tablet 200milliGRAM(s) Oral daily  atorvastatin 20milliGRAM(s) Oral at bedtime  docusate sodium 100milliGRAM(s) Oral daily  midodrine 30milliGRAM(s) Oral every 8 hours  sevelamer hydrochloride 800milliGRAM(s) Oral three times a day with meals  levothyroxine 75MICROGram(s) Oral daily  insulin lispro (HumaLOG) corrective regimen sliding scale  SubCutaneous three times a day before meals  insulin lispro (HumaLOG) corrective regimen sliding scale  SubCutaneous at bedtime  dextrose 5%. 1000milliLiter(s) IV Continuous <Continuous>  dextrose 50% Injectable 12.5Gram(s) IV Push once  dextrose 50% Injectable 25Gram(s) IV Push once  dextrose 50% Injectable 25Gram(s) IV Push once  DOBUTamine Infusion 7MICROgram(s)/kG/Min IV Continuous <Continuous>  acetaminophen   Tablet. 650milliGRAM(s) Oral once  levETIRAcetam  IVPB 500milliGRAM(s) IV Intermittent daily  levETIRAcetam  IVPB 250milliGRAM(s) IV Intermittent daily  buDESOnide 160 MICROgram(s)/formoterol 4.5 MICROgram(s) Inhaler 2Puff(s) Inhalation two times a day  epoetin alba Injectable 32889Dkek(s) IV Push <User Schedule>    MEDICATIONS  (PRN):  dextrose Gel 1Dose(s) Oral once PRN Blood Glucose LESS THAN 70 milliGRAM(s)/deciliter  glucagon  Injectable 1milliGRAM(s) IntraMuscular once PRN Glucose LESS THAN 70 milligrams/deciliter  acetaminophen   Tablet 650milliGRAM(s) Oral every 6 hours PRN For Temp greater than 38 C (100.4 F)  acetaminophen    Suspension. 650milliGRAM(s) Oral every 6 hours PRN Moderate Pain (4 - 6)  artificial tears (preservative free) Ophthalmic Solution 1Drop(s) Both EYES three times a day PRN Dry Eyes  sodium chloride 0.65% Nasal 1Spray(s) Both Nostrils four times a day PRN Nasal Congestion    Vital Signs Last 24 Hrs  T(C): 36.8, Max: 37.2 (06-16 @ 00:00)  T(F): 98.3, Max: 98.9 (06-16 @ 00:00)  HR: 79 (76 - 94)  BP: 115/54 (92/50 - 124/63)  BP(mean): --  RR: 18 (18 - 18)  SpO2: 97% (95% - 98%)    I&O's Summary  I & Os for 24h ending 16 Jun 2017 07:00  =============================================  IN: 1049.6 ml / OUT: 2900 ml / NET: -1850.4 ml    I & Os for current day (as of 16 Jun 2017 12:37)  =============================================  IN: 360 ml / OUT: 0 ml / NET: 360 ml        Physical Exam:   GENERAL: NAD, well-groomed, well-developed  HEENT: BELL/   Atraumatic, Normocephalic  ENMT: No tonsillar erythema, exudates, or enlargement; Moist mucous membranes, Good dentition, No lesions  NECK: Supple, No JVD, Normal thyroid  CHEST/LUNG: CRACKLES BILATERALLY   CVS: Regular rate and rhythm; No murmurs, rubs, or gallops  GI: : Soft, Nontender, Nondistended; Bowel sounds present  NERVOUS SYSTEM:  Alert & Oriented X3, Good concentration; Motor Strength 5/5 B/L upper and lower extremities; DTRs 2+ intact and symmetric  EXTREMITIES:  2+ Peripheral Pulses, No clubbing, cyanosis, or edema  LYMPH: No lymphadenopathy noted  SKIN: No rashes or lesions  ENDOCRINOLOGY: No Thyromegaly  PSYCH: Appropriate    Labs:                  7.9    9.58  )-----------( 221      ( 16 Jun 2017 06:30 )             27.1                         8.0    8.18  )-----------( 214      ( 15 Niles 2017 10:50 )             26.7                         7.7    8.26  )-----------( 189      ( 14 Jun 2017 09:00 )             26.1                         8.1    9.88  )-----------( 190      ( 13 Jun 2017 06:06 )             26.7     06-16    134<L>  |  94<L>  |  19  ----------------------------<  125<H>  4.0   |  26  |  3.80<H>  06-15    128<L>  |  90<L>  |  29<H>  ----------------------------<  120<H>  4.0   |  28  |  5.26<H>  06-14    129<L>  |  92<L>  |  21  ----------------------------<  83  4.1   |  22  |  4.01<H>  06-13    127<L>  |  87<L>  |  34<H>  ----------------------------<  115<H>  4.5   |  23  |  6.03<H>    Ca    8.3<L>      16 Jun 2017 06:30  Ca    9.1      15 Niles 2017 10:51  Mg     2.0     06-15      CAPILLARY BLOOD GLUCOSE  134 (16 Jun 2017 11:50)  131 (16 Jun 2017 08:25)  167 (15 Niles 2017 21:48)  141 (15 Niles 2017 16:58)        PT/INR - ( 16 Jun 2017 06:30 )   PT: 25.9 SEC;   INR: 2.27              Studies  Chest X-RAY  CT SCAN Chest   FINDINGS:    A right IJ hemodialysis catheter terminates at the SVC/right atrial   junction.    LUNGS AND LARGE AIRWAYS: Small amount of secretions in the trachea.   Diffuse bilateral groundglass opacities with traction bronchiectasis   throughout the lungs with regions of polygonal sparing bilateral lower   andleft upper lobes. There is honeycombing scattered throughout the   lungs. Interlobular septal thickening is noted in the bilateral upper   lobes. Patchy tree-in-bud opacities are noted in the bilateral lower   lobes. Scattered small calcified granulomas.  PLEURA: Trace bilateral pleural effusions.  VESSELS: Atherosclerotic calcifications of the aorta and coronary   arteries.  HEART: Cardiomegaly. No pericardial effusion.  MEDIASTINUM AND ASH: No lymphadenopathy.  CHEST WALL AND LOWER NECK: Left chest wall AICD. Generalized anasarca.  UPPER ABDOMEN: Hyperdense material within the gallbladder may reflect   vicarious excretion of iodinated contrast from recent procedure.  BONES: Degenerative changes of the spine.    IMPRESSION:    Extensive pulmonary fibrosis.    Evidence of superimposed mild small airway disease.  Venous Dopplers: LE:   CT Abdomen  Others

## 2017-06-16 NOTE — PROGRESS NOTE ADULT - PROBLEM SELECTOR PLAN 1
Will plan for next HD 6/17, unless pt develops respiratory distress and volume overload. no indication for PUF today   c/w renal diet, fluid restriction 1L/day   Very poor prognosis considering comorbidities

## 2017-06-16 NOTE — PROGRESS NOTE ADULT - SUBJECTIVE AND OBJECTIVE BOX
Subjective:Interval History - No events overnight, still complaining of left-sided weakness and back pain, in addition to pain extending from his right groin down his leg.     Objective:   Vital Signs Last 24 Hrs  T(C): 36.6, Max: 36.9 (06-14 @ 21:39)  T(F): 97.8, Max: 98.4 (06-14 @ 21:39)  HR: 79 (63 - 84)  BP: 97/56 (96/56 - 109/64)  BP(mean): --  RR: 18 (18 - 20)  SpO2: 98% (96% - 100%)    General Exam:   General appearance: No acute distress                   Neurological Exam:  Mental Status: Orientated to self, date and place.  Attention intact.  No dysarthria, aphasia or neglect.      Cranial Nerves: CN I - not tested.  PERRL, EOMI, VFF, no nystagmus or diplopia.  No APD.  Fundi not visualized bilaterally.  CN V1-3 intact to light touch and pinprick.  No facial asymmetry.  Hearing intact to finger rub bilaterally.  Tongue, uvula and palate midline.  Sternocleidomastoid and Trapezius intact bilaterally.    Motor:   Tone: normal.                  Strength: 4/5 on LLE  Pronator drift: none                 Dysmeria: None to finger-nose-finger or heel-shin-heel  No truncal ataxia.    Tremor: No resting, postural or action tremor.  No myoclonus.    Sensation: intact to light touch, pinprick, vibration and proprioception    Deep Tendon Reflexes: absent reflexes bilateral biceps, triceps, brachioradialis, knee and ankle  Toes flexor bilaterally    Gait: normal and stable.      Other:    06-14    129<L>  |  92<L>  |  21  ----------------------------<  83  4.1   |  22  |  4.01<H>    Ca    8.9      14 Jun 2017 09:00  Mg     1.9     06-14                              7.7    8.26  )-----------( 189      ( 14 Jun 2017 09:00 )             26.1         MEDICATIONS  (STANDING):  finasteride 5milliGRAM(s) Oral daily  amiodarone    Tablet 200milliGRAM(s) Oral daily  atorvastatin 20milliGRAM(s) Oral at bedtime  docusate sodium 100milliGRAM(s) Oral daily  midodrine 30milliGRAM(s) Oral every 8 hours  sevelamer hydrochloride 800milliGRAM(s) Oral three times a day with meals  levothyroxine 75MICROGram(s) Oral daily  insulin lispro (HumaLOG) corrective regimen sliding scale  SubCutaneous three times a day before meals  insulin lispro (HumaLOG) corrective regimen sliding scale  SubCutaneous at bedtime  dextrose 5%. 1000milliLiter(s) IV Continuous <Continuous>  dextrose 50% Injectable 12.5Gram(s) IV Push once  dextrose 50% Injectable 25Gram(s) IV Push once  dextrose 50% Injectable 25Gram(s) IV Push once  DOBUTamine Infusion 7MICROgram(s)/kG/Min IV Continuous <Continuous>  acetaminophen   Tablet. 650milliGRAM(s) Oral once  levETIRAcetam  IVPB 500milliGRAM(s) IV Intermittent daily  levETIRAcetam  IVPB 250milliGRAM(s) IV Intermittent daily  buDESOnide 160 MICROgram(s)/formoterol 4.5 MICROgram(s) Inhaler 2Puff(s) Inhalation two times a day  epoetin alba Injectable 33154Ydfp(s) IV Push <User Schedule>    MEDICATIONS  (PRN):  dextrose Gel 1Dose(s) Oral once PRN Blood Glucose LESS THAN 70 milliGRAM(s)/deciliter  glucagon  Injectable 1milliGRAM(s) IntraMuscular once PRN Glucose LESS THAN 70 milligrams/deciliter  acetaminophen   Tablet 650milliGRAM(s) Oral every 6 hours PRN For Temp greater than 38 C (100.4 F)  acetaminophen    Suspension. 650milliGRAM(s) Oral every 6 hours PRN Moderate Pain (4 - 6)  artificial tears (preservative free) Ophthalmic Solution 1Drop(s) Both EYES three times a day PRN Dry Eyes  sodium chloride 0.65% Nasal 1Spray(s) Both Nostrils four times a day PRN Nasal Congestion

## 2017-06-16 NOTE — PROGRESS NOTE ADULT - ASSESSMENT
63 yo Male  with acute on chronic severe systolic CHF (EF 19%), ESRD, DM2, A fib, HCAP  - HCAP -s/p abs  - Pulmonary fibrosis, COPD - c/w inhalers, no benefit of steroids  - Acute on chronic systolic CHF - continue inotropic support as per Cardio, HD for maximum fluid removal  - ESRD - continue HD as per Renal, continue Midodrine.  - A fib - continue Amio, follow INR,   - DM2 - controlled, continue with ISS  - LLE decreased ROM - MRI not possible in view of dobutamine drip,   pt on a/c  no signs of acute trauma  d/c planning   prognosis poor

## 2017-06-16 NOTE — PROGRESS NOTE ADULT - ATTENDING COMMENTS
Seen and examined on rounds.  RUE/RLE weakness beginning in the last week in the setting of pain.  In light of Afib, low CHF, would check an MRI brain w/o to r/o infarct.  Additionally complaining of low back and right leg weakness, check MRI L spine w/o, to evaluate for lumbar radiculopathy. Seen and examined on rounds.  RUE/RLE weakness beginning in the last week in the setting of pain.  CT head - no hemorrhage, no known stoke.  Unable to obtain MRI to r/o infarct, though less likely given his pain.  Complaining of lower back and groin pain, left leg weakness.  Would check CT L spine to rule out possibility hematoma in setting of coumadin, though this is a low suspicion as well.

## 2017-06-16 NOTE — PROGRESS NOTE ADULT - SUBJECTIVE AND OBJECTIVE BOX
CHIEF COMPLAINT:Patient is a 62y old  Male who presents with a chief complaint of sob (09 May 2017 15:32)    	pt sitting in chair  flores/ sob about the same        PAST MEDICAL & SURGICAL HISTORY:  Chronic hypotension  AF (atrial fibrillation)  COPD (chronic obstructive pulmonary disease): 4L home O2  HLD (hyperlipidemia)  DM (diabetes mellitus)  ESRD (end stage renal disease) on dialysis  BPH (benign prostatic hypertrophy)  Myocardial infarction: 10/2011  Chronic renal insufficiency  Gout  Dyslipidemia  Diabetes mellitus  CHF (congestive heart failure)  AICD (automatic cardioverter/defibrillator) present: Biot[employer] - placed 9/11/09  H/O coronary angiogram          REVIEW OF SYSTEMS:  CONSTITUTIONAL: No fever, weight loss, or fatigue  EYES: No eye pain, visual disturbances, or discharge  NECK: No pain or stiffness  RESPIRATORY:sob/flores  CARDIOVASCULAR: No chest pain, palpitations, passing out, dizziness, or leg swelling  GASTROINTESTINAL: No abdominal or epigastric pain. No nausea, vomiting, or hematemesis; No diarrhea or constipation. No melena or hematochezia.  GENITOURINARY: No dysuria, frequency, hematuria, or incontinence  NEUROLOGICAL: No headaches, memory loss, loss of strength, numbness, or tremors  SKIN: No itching, burning, rashes, or lesions   LYMPH Nodes: No enlarged glands  ENDOCRINE: No heat or cold intolerance; No hair loss  MUSCULOSKELETAL: No joint pain or swelling; No muscle, back, or extremity pain    Medications:  MEDICATIONS  (STANDING):  finasteride 5milliGRAM(s) Oral daily  amiodarone    Tablet 200milliGRAM(s) Oral daily  atorvastatin 20milliGRAM(s) Oral at bedtime  docusate sodium 100milliGRAM(s) Oral daily  midodrine 30milliGRAM(s) Oral every 8 hours  sevelamer hydrochloride 800milliGRAM(s) Oral three times a day with meals  levothyroxine 75MICROGram(s) Oral daily  insulin lispro (HumaLOG) corrective regimen sliding scale  SubCutaneous three times a day before meals  insulin lispro (HumaLOG) corrective regimen sliding scale  SubCutaneous at bedtime  dextrose 5%. 1000milliLiter(s) IV Continuous <Continuous>  dextrose 50% Injectable 12.5Gram(s) IV Push once  dextrose 50% Injectable 25Gram(s) IV Push once  dextrose 50% Injectable 25Gram(s) IV Push once  DOBUTamine Infusion 7MICROgram(s)/kG/Min IV Continuous <Continuous>  acetaminophen   Tablet. 650milliGRAM(s) Oral once  levETIRAcetam  IVPB 500milliGRAM(s) IV Intermittent daily  levETIRAcetam  IVPB 250milliGRAM(s) IV Intermittent daily  buDESOnide 160 MICROgram(s)/formoterol 4.5 MICROgram(s) Inhaler 2Puff(s) Inhalation two times a day  epoetin alba Injectable 72326Fdsw(s) IV Push <User Schedule>    MEDICATIONS  (PRN):  dextrose Gel 1Dose(s) Oral once PRN Blood Glucose LESS THAN 70 milliGRAM(s)/deciliter  glucagon  Injectable 1milliGRAM(s) IntraMuscular once PRN Glucose LESS THAN 70 milligrams/deciliter  acetaminophen   Tablet 650milliGRAM(s) Oral every 6 hours PRN For Temp greater than 38 C (100.4 F)  acetaminophen    Suspension. 650milliGRAM(s) Oral every 6 hours PRN Moderate Pain (4 - 6)  artificial tears (preservative free) Ophthalmic Solution 1Drop(s) Both EYES three times a day PRN Dry Eyes  sodium chloride 0.65% Nasal 1Spray(s) Both Nostrils four times a day PRN Nasal Congestion    	    PHYSICAL EXAM:  T(C): 36.8, Max: 37.2 (06-16 @ 00:00)  HR: 79 (76 - 94)  BP: 115/54 (92/50 - 124/63)  RR: 18 (18 - 18)  SpO2: 97% (95% - 98%)  Wt(kg): --  I&O's Summary    I & Os for current day (as of 16 Jun 2017 11:21)  =============================================  IN: 1049.6 ml / OUT: 2900 ml / NET: -1850.4 ml      Appearance: Normal	  HEENT:   Normal oral mucosa, PERRL, EOMI	  Lymphatic: No lymphadenopathy  Cardiovascular: Normal S1 S2, No JVD, No murmurs, No edema  Respiratory: dec bs  Psychiatry: A & O x 3, Mood & affect appropriate  Gastrointestinal:  Soft, Non-tender, + BS	  Skin: No rashes, No ecchymoses, No cyanosis	  Neurologic: Non-focal  Extremities: Normal range of motion, No clubbing, cyanosis /chronic edema  Vascular: Peripheral pulses palpable 2+ bilaterally    TELEMETRY: 	    ECG:  	  RADIOLOGY:  OTHER: 	  	  LABS:	 	    CARDIAC MARKERS:                                7.9    9.58  )-----------( 221      ( 16 Jun 2017 06:30 )             27.1     06-16    134<L>  |  94<L>  |  19  ----------------------------<  125<H>  4.0   |  26  |  3.80<H>    Ca    8.3<L>      16 Jun 2017 06:30  Mg     2.0     06-15      proBNP:   Lipid Profile:   HgA1c:   TSH:

## 2017-06-17 DIAGNOSIS — S70.10XA CONTUSION OF UNSPECIFIED THIGH, INITIAL ENCOUNTER: ICD-10-CM

## 2017-06-17 LAB
BUN SERPL-MCNC: 29 MG/DL — HIGH (ref 7–23)
CALCIUM SERPL-MCNC: 8.8 MG/DL — SIGNIFICANT CHANGE UP (ref 8.4–10.5)
CHLORIDE SERPL-SCNC: 91 MMOL/L — LOW (ref 98–107)
CO2 SERPL-SCNC: 24 MMOL/L — SIGNIFICANT CHANGE UP (ref 22–31)
CREAT SERPL-MCNC: 4.95 MG/DL — HIGH (ref 0.5–1.3)
GLUCOSE SERPL-MCNC: 150 MG/DL — HIGH (ref 70–99)
HCT VFR BLD CALC: 27.3 % — LOW (ref 39–50)
HGB BLD-MCNC: 8.1 G/DL — LOW (ref 13–17)
INR BLD: 2.07 — HIGH (ref 0.88–1.17)
MCHC RBC-ENTMCNC: 29.7 % — LOW (ref 32–36)
MCHC RBC-ENTMCNC: 30 PG — SIGNIFICANT CHANGE UP (ref 27–34)
MCV RBC AUTO: 101.1 FL — HIGH (ref 80–100)
PLATELET # BLD AUTO: 221 K/UL — SIGNIFICANT CHANGE UP (ref 150–400)
PMV BLD: 12.2 FL — SIGNIFICANT CHANGE UP (ref 7–13)
POTASSIUM SERPL-MCNC: 3.7 MMOL/L — SIGNIFICANT CHANGE UP (ref 3.5–5.3)
POTASSIUM SERPL-SCNC: 3.7 MMOL/L — SIGNIFICANT CHANGE UP (ref 3.5–5.3)
PROTHROM AB SERPL-ACNC: 23.5 SEC — HIGH (ref 9.8–13.1)
RBC # BLD: 2.7 M/UL — LOW (ref 4.2–5.8)
RBC # FLD: 24.2 % — HIGH (ref 10.3–14.5)
SODIUM SERPL-SCNC: 130 MMOL/L — LOW (ref 135–145)
WBC # BLD: 8.44 K/UL — SIGNIFICANT CHANGE UP (ref 3.8–10.5)
WBC # FLD AUTO: 8.44 K/UL — SIGNIFICANT CHANGE UP (ref 3.8–10.5)

## 2017-06-17 RX ORDER — POLYETHYLENE GLYCOL 3350 17 G/17G
17 POWDER, FOR SOLUTION ORAL ONCE
Qty: 0 | Refills: 0 | Status: COMPLETED | OUTPATIENT
Start: 2017-06-17 | End: 2017-06-17

## 2017-06-17 RX ADMIN — MIDODRINE HYDROCHLORIDE 30 MILLIGRAM(S): 2.5 TABLET ORAL at 15:14

## 2017-06-17 RX ADMIN — SEVELAMER CARBONATE 800 MILLIGRAM(S): 2400 POWDER, FOR SUSPENSION ORAL at 09:14

## 2017-06-17 RX ADMIN — POLYETHYLENE GLYCOL 3350 17 GRAM(S): 17 POWDER, FOR SOLUTION ORAL at 22:38

## 2017-06-17 RX ADMIN — FINASTERIDE 5 MILLIGRAM(S): 5 TABLET, FILM COATED ORAL at 12:52

## 2017-06-17 RX ADMIN — LEVETIRACETAM 400 MILLIGRAM(S): 250 TABLET, FILM COATED ORAL at 15:13

## 2017-06-17 RX ADMIN — SEVELAMER CARBONATE 800 MILLIGRAM(S): 2400 POWDER, FOR SUSPENSION ORAL at 18:34

## 2017-06-17 RX ADMIN — LEVETIRACETAM 400 MILLIGRAM(S): 250 TABLET, FILM COATED ORAL at 12:52

## 2017-06-17 RX ADMIN — MIDODRINE HYDROCHLORIDE 30 MILLIGRAM(S): 2.5 TABLET ORAL at 22:30

## 2017-06-17 RX ADMIN — Medication 75 MICROGRAM(S): at 05:32

## 2017-06-17 RX ADMIN — ATORVASTATIN CALCIUM 20 MILLIGRAM(S): 80 TABLET, FILM COATED ORAL at 22:30

## 2017-06-17 RX ADMIN — AMIODARONE HYDROCHLORIDE 200 MILLIGRAM(S): 400 TABLET ORAL at 05:32

## 2017-06-17 RX ADMIN — SEVELAMER CARBONATE 800 MILLIGRAM(S): 2400 POWDER, FOR SUSPENSION ORAL at 12:51

## 2017-06-17 RX ADMIN — Medication 20.85 MICROGRAM(S)/KG/MIN: at 18:34

## 2017-06-17 RX ADMIN — MIDODRINE HYDROCHLORIDE 30 MILLIGRAM(S): 2.5 TABLET ORAL at 05:32

## 2017-06-17 RX ADMIN — BUDESONIDE AND FORMOTEROL FUMARATE DIHYDRATE 2 PUFF(S): 160; 4.5 AEROSOL RESPIRATORY (INHALATION) at 22:30

## 2017-06-17 RX ADMIN — BUDESONIDE AND FORMOTEROL FUMARATE DIHYDRATE 2 PUFF(S): 160; 4.5 AEROSOL RESPIRATORY (INHALATION) at 09:13

## 2017-06-17 RX ADMIN — Medication 100 MILLIGRAM(S): at 15:14

## 2017-06-17 RX ADMIN — ERYTHROPOIETIN 20000 UNIT(S): 10000 INJECTION, SOLUTION INTRAVENOUS; SUBCUTANEOUS at 08:41

## 2017-06-17 NOTE — PROGRESS NOTE ADULT - PROBLEM SELECTOR PLAN 1
Currently on HD via right PC  Low asymptomatic BP but tolerating HD okay; 2k bath and uf 2.5kg as tolerated  trend bmp  c/w renal diet, fluid restriction 1L/day   Very poor prognosis considering comorbidities

## 2017-06-17 NOTE — PROGRESS NOTE ADULT - ASSESSMENT
61 yo M with acute on chronic severe systolic CHF (EF 19%), ESRD, DM2, A fib, HCAP  s/p acute resp distress with accidental choking on Dentures   have been doing reasonably OK at this time.   ILEOPSOAS HEMATOMA NOTED

## 2017-06-17 NOTE — PROGRESS NOTE ADULT - ASSESSMENT
Patient is a 62y Male with congestive heart failure , End Stage Renal Disease on Dialysis ,pulm edema, pulm fibrosis, resp failure on BiPAP, s/p seizure, w/persistent hypotension,  1.	End Stage Renal Disease on Dialysis - k wnl, hypervolemic. Currently on HD ; Low bp-asymptomatic; tolerating well   2.	Hyponatremia- 2/2 dilutional, improved  3.	anemia of renal dis- not well controlled,

## 2017-06-17 NOTE — PROGRESS NOTE ADULT - SUBJECTIVE AND OBJECTIVE BOX
Patient seen and examined  no complaints  denies f/v/d/c/sob/cp    No Known Allergies    Hospital Medications:   MEDICATIONS  (STANDING):  finasteride 5milliGRAM(s) Oral daily  amiodarone    Tablet 200milliGRAM(s) Oral daily  atorvastatin 20milliGRAM(s) Oral at bedtime  docusate sodium 100milliGRAM(s) Oral daily  midodrine 30milliGRAM(s) Oral every 8 hours  sevelamer hydrochloride 800milliGRAM(s) Oral three times a day with meals  levothyroxine 75MICROGram(s) Oral daily  insulin lispro (HumaLOG) corrective regimen sliding scale  SubCutaneous three times a day before meals  insulin lispro (HumaLOG) corrective regimen sliding scale  SubCutaneous at bedtime  dextrose 5%. 1000milliLiter(s) IV Continuous <Continuous>  dextrose 50% Injectable 12.5Gram(s) IV Push once  dextrose 50% Injectable 25Gram(s) IV Push once  dextrose 50% Injectable 25Gram(s) IV Push once  DOBUTamine Infusion 7MICROgram(s)/kG/Min IV Continuous <Continuous>  acetaminophen   Tablet. 650milliGRAM(s) Oral once  levETIRAcetam  IVPB 500milliGRAM(s) IV Intermittent daily  levETIRAcetam  IVPB 250milliGRAM(s) IV Intermittent daily  buDESOnide 160 MICROgram(s)/formoterol 4.5 MICROgram(s) Inhaler 2Puff(s) Inhalation two times a day  epoetin alba Injectable 80789Zsdl(s) IV Push <User Schedule>      VITALS:  T(F): 97.2, Max: 98.7 (06-17 @ 03:00)  HR: 65  BP: 95/56  RR: 18  SpO2: 97%  Wt(kg): --  I & Os for 24h ending 06-17 @ 07:00  =============================================  IN: 460 ml / OUT: 0 ml / NET: 460 ml    I & Os for current day (as of 06-17 @ 11:39)  =============================================  IN: 700 ml / OUT: 3200 ml / NET: -2500 ml      PHYSICAL EXAM:  Constitutional: NAD  HEENT: anicteric sclera, oropharynx clear, MMM  Neck: No JVD  Respiratory: Bibasilar rales   Cardiovascular: S1, S2, RRR  Gastrointestinal: BS+, soft, NT/ND  Extremities: No cyanosis or clubbing. Trace LE peripheral edema b/l  Neurological: A/O x 2, no focal deficits  Psychiatric: Normal mood, normal affect  : No CVA tenderness. No rosa.   Skin: No rashes  Vascular Access: RIJ Tunneled cath     LABS:  06-17    130<L>  |  91<L>  |  29<H>  ----------------------------<  150<H>  3.7   |  24  |  4.95<H>    Ca    8.8      17 Jun 2017 07:30      Creatinine Trend: 4.95 <--, 3.80 <--, 5.26 <--, 4.01 <--, 6.03 <--, 4.59 <--, 3.54 <--                        8.1    8.44  )-----------( 221      ( 17 Jun 2017 07:30 )             27.3

## 2017-06-17 NOTE — PROGRESS NOTE ADULT - SUBJECTIVE AND OBJECTIVE BOX
CHIEF COMPLAINT:Patient is a 62y old  Male who presents with a chief complaint of sob (09 May 2017 15:32)    	pt with no new complaints        PAST MEDICAL & SURGICAL HISTORY:  Chronic hypotension  AF (atrial fibrillation)  COPD (chronic obstructive pulmonary disease): 4L home O2  HLD (hyperlipidemia)  DM (diabetes mellitus)  ESRD (end stage renal disease) on dialysis  BPH (benign prostatic hypertrophy)  Myocardial infarction: 10/2011  Chronic renal insufficiency  Gout  Dyslipidemia  Diabetes mellitus  CHF (congestive heart failure)  AICD (automatic cardioverter/defibrillator) present: Biotronic - placed 9/11/09  H/O coronary angiogram          REVIEW OF SYSTEMS:  CONSTITUTIONAL: No fever, weight loss, or fatigue  EYES: No eye pain, visual disturbances, or discharge  NECK: No pain or stiffness  RESPIRATORY: sob/flores  CARDIOVASCULAR: No chest pain, palpitations, passing out, dizziness, or leg swelling  GASTROINTESTINAL: No abdominal or epigastric pain. No nausea, vomiting, or hematemesis; No diarrhea or constipation. No melena or hematochezia.  GENITOURINARY: No dysuria, frequency, hematuria, or incontinence  NEUROLOGICAL: No headaches, memory loss, loss of strength, numbness, or tremors  SKIN: No itching, burning, rashes, or lesions   LYMPH Nodes: No enlarged glands  ENDOCRINE: No heat or cold intolerance; No hair loss  MUSCULOSKELETAL: lower ext left groin pain at times    Medications:  MEDICATIONS  (STANDING):  finasteride 5milliGRAM(s) Oral daily  amiodarone    Tablet 200milliGRAM(s) Oral daily  atorvastatin 20milliGRAM(s) Oral at bedtime  docusate sodium 100milliGRAM(s) Oral daily  midodrine 30milliGRAM(s) Oral every 8 hours  sevelamer hydrochloride 800milliGRAM(s) Oral three times a day with meals  levothyroxine 75MICROGram(s) Oral daily  insulin lispro (HumaLOG) corrective regimen sliding scale  SubCutaneous three times a day before meals  insulin lispro (HumaLOG) corrective regimen sliding scale  SubCutaneous at bedtime  dextrose 5%. 1000milliLiter(s) IV Continuous <Continuous>  dextrose 50% Injectable 12.5Gram(s) IV Push once  dextrose 50% Injectable 25Gram(s) IV Push once  dextrose 50% Injectable 25Gram(s) IV Push once  DOBUTamine Infusion 7MICROgram(s)/kG/Min IV Continuous <Continuous>  acetaminophen   Tablet. 650milliGRAM(s) Oral once  levETIRAcetam  IVPB 500milliGRAM(s) IV Intermittent daily  levETIRAcetam  IVPB 250milliGRAM(s) IV Intermittent daily  buDESOnide 160 MICROgram(s)/formoterol 4.5 MICROgram(s) Inhaler 2Puff(s) Inhalation two times a day  epoetin alba Injectable 98601Gykf(s) IV Push <User Schedule>    MEDICATIONS  (PRN):  dextrose Gel 1Dose(s) Oral once PRN Blood Glucose LESS THAN 70 milliGRAM(s)/deciliter  glucagon  Injectable 1milliGRAM(s) IntraMuscular once PRN Glucose LESS THAN 70 milligrams/deciliter  acetaminophen   Tablet 650milliGRAM(s) Oral every 6 hours PRN For Temp greater than 38 C (100.4 F)  acetaminophen    Suspension. 650milliGRAM(s) Oral every 6 hours PRN Moderate Pain (4 - 6)  artificial tears (preservative free) Ophthalmic Solution 1Drop(s) Both EYES three times a day PRN Dry Eyes  sodium chloride 0.65% Nasal 1Spray(s) Both Nostrils four times a day PRN Nasal Congestion  oxyCODONE  5 mG/acetaminophen 325 mG 2Tablet(s) Oral every 6 hours PRN Severe Pain (7 - 10)    	    PHYSICAL EXAM:  T(C): 36.5, Max: 37.1 (06-17 @ 03:00)  HR: 80 (61 - 97)  BP: 130/68 (90/50 - 130/68)  RR: 18 (18 - 18)  SpO2: 97% (95% - 98%)  Wt(kg): --  I&O's Summary    I & Os for current day (as of 17 Jun 2017 07:32)  =============================================  IN: 360 ml / OUT: 0 ml / NET: 360 ml      Appearance: Normal	  HEENT:   Normal oral mucosa, PERRL, EOMI	  Lymphatic: No lymphadenopathy  Cardiovascular: Normal S1 S2, No JVD, No murmurs, No edema  Respiratory:dec bs   Psychiatry: A & O x 3, Mood & affect appropriate  Gastrointestinal:  Soft, Non-tender, + BS	  Skin: No rashes, No ecchymoses, No cyanosis	  Neurologic: Non-focal from sensory intact  Extremities: Normal range of motion, chronic changes / edema   Vascular: Peripheral pulses palpable 2+ bilaterally    TELEMETRY: 	    ECG:  	  RADIOLOGY:  OTHER: 	  	  LABS:	 	    CARDIAC MARKERS:                                7.9    9.58  )-----------( 221      ( 16 Jun 2017 06:30 )             27.1     06-16    134<L>  |  94<L>  |  19  ----------------------------<  125<H>  4.0   |  26  |  3.80<H>    Ca    8.3<L>      16 Jun 2017 06:30  Mg     2.0     06-15      proBNP:   Lipid Profile:   HgA1c:   TSH:

## 2017-06-17 NOTE — PROGRESS NOTE ADULT - SUBJECTIVE AND OBJECTIVE BOX
Patient is a 62y old  Male who presents with a chief complaint of sob (09 May 2017 15:32)    on 5 l of oxygen  no events overnight     BIPAP 10/5::40%        Any change in ROS: none    MEDICATIONS  (STANDING):  finasteride 5milliGRAM(s) Oral daily  amiodarone    Tablet 200milliGRAM(s) Oral daily  atorvastatin 20milliGRAM(s) Oral at bedtime  docusate sodium 100milliGRAM(s) Oral daily  midodrine 30milliGRAM(s) Oral every 8 hours  sevelamer hydrochloride 800milliGRAM(s) Oral three times a day with meals  levothyroxine 75MICROGram(s) Oral daily  insulin lispro (HumaLOG) corrective regimen sliding scale  SubCutaneous three times a day before meals  insulin lispro (HumaLOG) corrective regimen sliding scale  SubCutaneous at bedtime  dextrose 5%. 1000milliLiter(s) IV Continuous <Continuous>  dextrose 50% Injectable 12.5Gram(s) IV Push once  dextrose 50% Injectable 25Gram(s) IV Push once  dextrose 50% Injectable 25Gram(s) IV Push once  DOBUTamine Infusion 7MICROgram(s)/kG/Min IV Continuous <Continuous>  acetaminophen   Tablet. 650milliGRAM(s) Oral once  levETIRAcetam  IVPB 500milliGRAM(s) IV Intermittent daily  levETIRAcetam  IVPB 250milliGRAM(s) IV Intermittent daily  buDESOnide 160 MICROgram(s)/formoterol 4.5 MICROgram(s) Inhaler 2Puff(s) Inhalation two times a day  epoetin alba Injectable 33070Quad(s) IV Push <User Schedule>    MEDICATIONS  (PRN):  dextrose Gel 1Dose(s) Oral once PRN Blood Glucose LESS THAN 70 milliGRAM(s)/deciliter  glucagon  Injectable 1milliGRAM(s) IntraMuscular once PRN Glucose LESS THAN 70 milligrams/deciliter  acetaminophen   Tablet 650milliGRAM(s) Oral every 6 hours PRN For Temp greater than 38 C (100.4 F)  acetaminophen    Suspension. 650milliGRAM(s) Oral every 6 hours PRN Moderate Pain (4 - 6)  artificial tears (preservative free) Ophthalmic Solution 1Drop(s) Both EYES three times a day PRN Dry Eyes  sodium chloride 0.65% Nasal 1Spray(s) Both Nostrils four times a day PRN Nasal Congestion  oxyCODONE  5 mG/acetaminophen 325 mG 2Tablet(s) Oral every 6 hours PRN Severe Pain (7 - 10)    Vital Signs Last 24 Hrs  T(C): 36.9, Max: 37.1 (06-17 @ 03:00)  T(F): 98.5, Max: 98.7 (06-17 @ 03:00)  HR: 88 (65 - 97)  BP: 151/58 (92/51 - 151/58)  BP(mean): --  RR: 18 (18 - 18)  SpO2: 97% (95% - 97%)    I&O's Summary  I & Os for 24h ending 17 Jun 2017 07:00  =============================================  IN: 460 ml / OUT: 0 ml / NET: 460 ml    I & Os for current day (as of 17 Jun 2017 19:50)  =============================================  IN: 700 ml / OUT: 3200 ml / NET: -2500 ml        Physical Exam:   GENERAL: NAD, well-groomed, well-developed  HEENT: BELL/   Atraumatic, Normocephalic  ENMT: No tonsillar erythema, exudates, or enlargement; Moist mucous membranes, Good dentition, No lesions  NECK: Supple, No JVD, Normal thyroid  CHEST/LUNG: bibasilar crackles   CVS: Regular rate and rhythm; No murmurs, rubs, or gallops  GI: : Soft, Nontender, Nondistended; Bowel sounds present  NERVOUS SYSTEM:  Alert & Oriented X3, Good concentration; Motor Strength 5/5 B/L upper and lower extremities; DTRs 2+ intact and symmetric  ENDOCRINOLOGY: No Thyromegaly  PSYCH: Appropriate      Labs:                              8.1    8.44  )-----------( 221      ( 17 Jun 2017 07:30 )             27.3                         7.9    9.58  )-----------( 221      ( 16 Jun 2017 06:30 )             27.1                         8.0    8.18  )-----------( 214      ( 15 Niles 2017 10:50 )             26.7                         7.7    8.26  )-----------( 189      ( 14 Jun 2017 09:00 )             26.1     06-17    130<L>  |  91<L>  |  29<H>  ----------------------------<  150<H>  3.7   |  24  |  4.95<H>  06-16    134<L>  |  94<L>  |  19  ----------------------------<  125<H>  4.0   |  26  |  3.80<H>  06-15    128<L>  |  90<L>  |  29<H>  ----------------------------<  120<H>  4.0   |  28  |  5.26<H>  06-14    129<L>  |  92<L>  |  21  ----------------------------<  83  4.1   |  22  |  4.01<H>    Ca    8.8      17 Jun 2017 07:30  Ca    8.3<L>      16 Jun 2017 06:30      CAPILLARY BLOOD GLUCOSE  145 (17 Jun 2017 17:06)  142 (17 Jun 2017 11:34)  118 (17 Jun 2017 08:21)  149 (16 Jun 2017 21:53)        PT/INR - ( 17 Jun 2017 07:30 )   PT: 23.5 SEC;   INR: 2.07              Cultures:                             Studies  Chest X-RAY  IMPRESSION:  No lumbosacral fracture. No epidural hematoma.    Bilateral iliopsoas intramuscular hematomas, incompletely visualized.         Results were discussed with Dr. Browning by Dr. Parikh at 2:42 PM,   6/16/2017 with read back followed.  CT SCAN Chest   Venous Dopplers: LE:   CT Abdomen  Others

## 2017-06-17 NOTE — PROGRESS NOTE ADULT - ASSESSMENT
63 yo Male  with acute on chronic severe systolic CHF (EF 19%), ESRD, DM2, A fib, HCAP  - HCAP -s/p abs  aocd  - Pulmonary fibrosis, COPD - c/w inhalers, no benefit of steroids  - Acute on chronic systolic CHF - continue inotropic support as per Cardio, HD for maximum fluid removal  - ESRD - continue HD as per Renal, continue Midodrine.  - A fib - continue Amio,   - DM2 - controlled, continue with ISS  - LLE decreased ROM -imaging w/ hematomas  sx eval noted  no sx intervention  hold a/c for now  analgesics  pt  discussed w/ pt family at bedside  d/c planning to [place]  prognosis poor

## 2017-06-18 LAB
BUN SERPL-MCNC: 20 MG/DL — SIGNIFICANT CHANGE UP (ref 7–23)
CALCIUM SERPL-MCNC: 9.3 MG/DL — SIGNIFICANT CHANGE UP (ref 8.4–10.5)
CHLORIDE SERPL-SCNC: 94 MMOL/L — LOW (ref 98–107)
CO2 SERPL-SCNC: 25 MMOL/L — SIGNIFICANT CHANGE UP (ref 22–31)
CREAT SERPL-MCNC: 3.76 MG/DL — HIGH (ref 0.5–1.3)
GLUCOSE SERPL-MCNC: 104 MG/DL — HIGH (ref 70–99)
HCT VFR BLD CALC: 28 % — LOW (ref 39–50)
HGB BLD-MCNC: 8.3 G/DL — LOW (ref 13–17)
INR BLD: 1.83 — HIGH (ref 0.88–1.17)
MCHC RBC-ENTMCNC: 29.6 % — LOW (ref 32–36)
MCHC RBC-ENTMCNC: 30 PG — SIGNIFICANT CHANGE UP (ref 27–34)
MCV RBC AUTO: 101.1 FL — HIGH (ref 80–100)
PLATELET # BLD AUTO: 231 K/UL — SIGNIFICANT CHANGE UP (ref 150–400)
PMV BLD: 12.1 FL — SIGNIFICANT CHANGE UP (ref 7–13)
POTASSIUM SERPL-MCNC: 3.8 MMOL/L — SIGNIFICANT CHANGE UP (ref 3.5–5.3)
POTASSIUM SERPL-SCNC: 3.8 MMOL/L — SIGNIFICANT CHANGE UP (ref 3.5–5.3)
PROTHROM AB SERPL-ACNC: 20.8 SEC — HIGH (ref 9.8–13.1)
RBC # BLD: 2.77 M/UL — LOW (ref 4.2–5.8)
RBC # FLD: 24.3 % — HIGH (ref 10.3–14.5)
SODIUM SERPL-SCNC: 134 MMOL/L — LOW (ref 135–145)
WBC # BLD: 8.47 K/UL — SIGNIFICANT CHANGE UP (ref 3.8–10.5)
WBC # FLD AUTO: 8.47 K/UL — SIGNIFICANT CHANGE UP (ref 3.8–10.5)

## 2017-06-18 RX ORDER — GLYCERIN ADULT
1 SUPPOSITORY, RECTAL RECTAL ONCE
Qty: 0 | Refills: 0 | Status: DISCONTINUED | OUTPATIENT
Start: 2017-06-18 | End: 2017-06-23

## 2017-06-18 RX ADMIN — LEVETIRACETAM 400 MILLIGRAM(S): 250 TABLET, FILM COATED ORAL at 12:41

## 2017-06-18 RX ADMIN — SEVELAMER CARBONATE 800 MILLIGRAM(S): 2400 POWDER, FOR SUSPENSION ORAL at 08:28

## 2017-06-18 RX ADMIN — MIDODRINE HYDROCHLORIDE 30 MILLIGRAM(S): 2.5 TABLET ORAL at 13:44

## 2017-06-18 RX ADMIN — SEVELAMER CARBONATE 800 MILLIGRAM(S): 2400 POWDER, FOR SUSPENSION ORAL at 18:25

## 2017-06-18 RX ADMIN — SEVELAMER CARBONATE 800 MILLIGRAM(S): 2400 POWDER, FOR SUSPENSION ORAL at 12:40

## 2017-06-18 RX ADMIN — Medication 75 MICROGRAM(S): at 05:45

## 2017-06-18 RX ADMIN — BUDESONIDE AND FORMOTEROL FUMARATE DIHYDRATE 2 PUFF(S): 160; 4.5 AEROSOL RESPIRATORY (INHALATION) at 09:05

## 2017-06-18 RX ADMIN — MIDODRINE HYDROCHLORIDE 30 MILLIGRAM(S): 2.5 TABLET ORAL at 05:45

## 2017-06-18 RX ADMIN — AMIODARONE HYDROCHLORIDE 200 MILLIGRAM(S): 400 TABLET ORAL at 05:45

## 2017-06-18 RX ADMIN — FINASTERIDE 5 MILLIGRAM(S): 5 TABLET, FILM COATED ORAL at 12:40

## 2017-06-18 RX ADMIN — BUDESONIDE AND FORMOTEROL FUMARATE DIHYDRATE 2 PUFF(S): 160; 4.5 AEROSOL RESPIRATORY (INHALATION) at 21:33

## 2017-06-18 RX ADMIN — Medication: at 18:29

## 2017-06-18 RX ADMIN — Medication 100 MILLIGRAM(S): at 12:40

## 2017-06-18 RX ADMIN — MIDODRINE HYDROCHLORIDE 30 MILLIGRAM(S): 2.5 TABLET ORAL at 21:33

## 2017-06-18 RX ADMIN — ATORVASTATIN CALCIUM 20 MILLIGRAM(S): 80 TABLET, FILM COATED ORAL at 21:33

## 2017-06-18 NOTE — PROGRESS NOTE ADULT - SUBJECTIVE AND OBJECTIVE BOX
CHIEF COMPLAINT:Patient is a 62y old  Male who presents with a chief complaint of sob (09 May 2017 15:32)    	        PAST MEDICAL & SURGICAL HISTORY:  Chronic hypotension  AF (atrial fibrillation)  COPD (chronic obstructive pulmonary disease): 4L home O2  HLD (hyperlipidemia)  DM (diabetes mellitus)  ESRD (end stage renal disease) on dialysis  BPH (benign prostatic hypertrophy)  Myocardial infarction: 10/2011  Chronic renal insufficiency  Gout  Dyslipidemia  Diabetes mellitus  CHF (congestive heart failure)  AICD (automatic cardioverter/defibrillator) present: Biotronic - placed 9/11/09  H/O coronary angiogram          REVIEW OF SYSTEMS:  CONSTITUTIONAL: No fever, weight loss, or fatigue  EYES: No eye pain, visual disturbances, or discharge  NECK: No pain or stiffness  RESPIRATORY: sob/flores   CARDIOVASCULAR: No chest pain, palpitations, passing out, dizziness, or leg swelling  GASTROINTESTINAL: No abdominal or epigastric pain. No nausea, vomiting, or hematemesis; No diarrhea or constipation. No melena or hematochezia.  GENITOURINARY: No dysuria, frequency, hematuria, or incontinence  NEUROLOGICAL: No headaches, memory loss, loss of strength, numbness, or tremors  SKIN: No itching, burning, rashes, or lesions   LYMPH Nodes: No enlarged glands  ENDOCRINE: No heat or cold intolerance; No hair loss  MUSCULOSKELETAL: less groin pain     Medications:  MEDICATIONS  (STANDING):  finasteride 5milliGRAM(s) Oral daily  amiodarone    Tablet 200milliGRAM(s) Oral daily  atorvastatin 20milliGRAM(s) Oral at bedtime  docusate sodium 100milliGRAM(s) Oral daily  midodrine 30milliGRAM(s) Oral every 8 hours  sevelamer hydrochloride 800milliGRAM(s) Oral three times a day with meals  levothyroxine 75MICROGram(s) Oral daily  insulin lispro (HumaLOG) corrective regimen sliding scale  SubCutaneous three times a day before meals  insulin lispro (HumaLOG) corrective regimen sliding scale  SubCutaneous at bedtime  dextrose 5%. 1000milliLiter(s) IV Continuous <Continuous>  dextrose 50% Injectable 12.5Gram(s) IV Push once  dextrose 50% Injectable 25Gram(s) IV Push once  dextrose 50% Injectable 25Gram(s) IV Push once  DOBUTamine Infusion 7MICROgram(s)/kG/Min IV Continuous <Continuous>  acetaminophen   Tablet. 650milliGRAM(s) Oral once  levETIRAcetam  IVPB 500milliGRAM(s) IV Intermittent daily  levETIRAcetam  IVPB 250milliGRAM(s) IV Intermittent daily  buDESOnide 160 MICROgram(s)/formoterol 4.5 MICROgram(s) Inhaler 2Puff(s) Inhalation two times a day  epoetin alba Injectable 83691Hshs(s) IV Push <User Schedule>  glycerin Suppository - Adult 1Suppository(s) Rectal once    MEDICATIONS  (PRN):  dextrose Gel 1Dose(s) Oral once PRN Blood Glucose LESS THAN 70 milliGRAM(s)/deciliter  glucagon  Injectable 1milliGRAM(s) IntraMuscular once PRN Glucose LESS THAN 70 milligrams/deciliter  acetaminophen   Tablet 650milliGRAM(s) Oral every 6 hours PRN For Temp greater than 38 C (100.4 F)  acetaminophen    Suspension. 650milliGRAM(s) Oral every 6 hours PRN Moderate Pain (4 - 6)  artificial tears (preservative free) Ophthalmic Solution 1Drop(s) Both EYES three times a day PRN Dry Eyes  sodium chloride 0.65% Nasal 1Spray(s) Both Nostrils four times a day PRN Nasal Congestion  oxyCODONE  5 mG/acetaminophen 325 mG 2Tablet(s) Oral every 6 hours PRN Severe Pain (7 - 10)    	    PHYSICAL EXAM:  T(C): 37.1, Max: 37.1 (06-18 @ 13:28)  HR: 80 (79 - 97)  BP: 92/57 (92/57 - 151/58)  RR: 18 (18 - 18)  SpO2: 92% (92% - 100%)  Wt(kg): --  I&O's Summary    I & Os for current day (as of 18 Jun 2017 15:39)  =============================================  IN: 950 ml / OUT: 3200 ml / NET: -2250 ml      Appearance: Normal	  HEENT:   Normal oral mucosa, PERRL, EOMI	  Lymphatic: No lymphadenopathy  Cardiovascular: Normal S1 S2, No JVD, No murmurs, No edema  Respiratory: dec bs   Psychiatry: A & O x 3, Mood & affect appropriate  Gastrointestinal:  Soft, Non-tender, + BS	  Skin: No rashes, No ecchymoses, No cyanosis	  Neurologic: Non-focal from  Extremities: chronic edema  Vascular: Peripheral pulses palpable 2+ bilaterally    TELEMETRY: 	    ECG:  	  RADIOLOGY:  OTHER: 	  	  LABS:	 	    CARDIAC MARKERS:                                8.3    8.47  )-----------( 231      ( 18 Jun 2017 07:30 )             28.0     06-18    134<L>  |  94<L>  |  20  ----------------------------<  104<H>  3.8   |  25  |  3.76<H>    Ca    9.3      18 Jun 2017 07:30      proBNP:   Lipid Profile:   HgA1c:   TSH:

## 2017-06-18 NOTE — PROGRESS NOTE ADULT - SUBJECTIVE AND OBJECTIVE BOX
Patient is a 62y old  Male who presents with a chief complaint of sob (09 May 2017 15:32)    constipation ++   on bipap for SOB secondary to constipation      Any change in ROS:     MEDICATIONS  (STANDING):  finasteride 5milliGRAM(s) Oral daily  amiodarone    Tablet 200milliGRAM(s) Oral daily  atorvastatin 20milliGRAM(s) Oral at bedtime  docusate sodium 100milliGRAM(s) Oral daily  midodrine 30milliGRAM(s) Oral every 8 hours  sevelamer hydrochloride 800milliGRAM(s) Oral three times a day with meals  levothyroxine 75MICROGram(s) Oral daily  insulin lispro (HumaLOG) corrective regimen sliding scale  SubCutaneous three times a day before meals  insulin lispro (HumaLOG) corrective regimen sliding scale  SubCutaneous at bedtime  dextrose 5%. 1000milliLiter(s) IV Continuous <Continuous>  dextrose 50% Injectable 12.5Gram(s) IV Push once  dextrose 50% Injectable 25Gram(s) IV Push once  dextrose 50% Injectable 25Gram(s) IV Push once  DOBUTamine Infusion 7MICROgram(s)/kG/Min IV Continuous <Continuous>  acetaminophen   Tablet. 650milliGRAM(s) Oral once  levETIRAcetam  IVPB 500milliGRAM(s) IV Intermittent daily  levETIRAcetam  IVPB 250milliGRAM(s) IV Intermittent daily  buDESOnide 160 MICROgram(s)/formoterol 4.5 MICROgram(s) Inhaler 2Puff(s) Inhalation two times a day  epoetin alba Injectable [id number](s) IV Push <User Schedule>  glycerin Suppository - Adult 1Suppository(s) Rectal once    MEDICATIONS  (PRN):  dextrose Gel 1Dose(s) Oral once PRN Blood Glucose LESS THAN 70 milliGRAM(s)/deciliter  glucagon  Injectable 1milliGRAM(s) IntraMuscular once PRN Glucose LESS THAN 70 milligrams/deciliter  acetaminophen   Tablet 650milliGRAM(s) Oral every 6 hours PRN For Temp greater than 38 C (100.4 F)  acetaminophen    Suspension. 650milliGRAM(s) Oral every 6 hours PRN Moderate Pain (4 - 6)  artificial tears (preservative free) Ophthalmic Solution 1Drop(s) Both EYES three times a day PRN Dry Eyes  sodium chloride 0.65% Nasal 1Spray(s) Both Nostrils four times a day PRN Nasal Congestion  oxyCODONE  5 mG/acetaminophen 325 mG 2Tablet(s) Oral every 6 hours PRN Severe Pain (7 - 10)    Vital Signs Last 24 Hrs  T(C): 36.6, Max: 37 (06-17 @ 13:55)  T(F): 97.8, Max: 98.6 (06-17 @ 13:55)  HR: 81 (79 - 88)  BP: 97/52 (92/51 - 151/58)  BP(mean): --  RR: 18 (18 - 18)  SpO2: 97% (96% - 100%)    I&O's Summary    I & Os for current day (as of 18 Jun 2017 10:03)  =============================================  IN: 950 ml / OUT: 3200 ml / NET: -2250 ml        Physical Exam:   GENERAL: NAD, well-groomed, well-developed  HEENT: BELL/   Atraumatic, Normocephalic  ENMT: No tonsillar erythema, exudates, or enlargement; Moist mucous membranes, Good dentition, No lesions  NECK: Supple, No JVD, Normal thyroid  CHEST/LUNG: clear  CVS: Regular rate and rhythm; No murmurs, rubs, or gallops  GI: : Soft, Nontender, Nondistended; Bowel sounds present  NERVOUS SYSTEM:  Alert & Oriented X3, Good concentration; Motor Strength 5/5 B/L upper and lower extremities; DTRs 2+ intact and symmetric  EXTREMITIES:  2+ Peripheral Pulses, No clubbing, cyanosis, or edema  LYMPH: No lymphadenopathy noted  SKIN: No rashes or lesions  ENDOCRINOLOGY: No Thyromegaly  PSYCH: Appropriate    Labs:                       8.3    8.47  )-----------( 231      ( 18 Jun 2017 07:30 )             28.0                         8.1    8.44  )-----------( 221      ( 17 Jun 2017 07:30 )             27.3                         7.9    9.58  )-----------( 221      ( 16 Jun 2017 06:30 )             27.1                         8.0    8.18  )-----------( 214      ( 15 Niles 2017 10:50 )             26.7     06-18    134<L>  |  94<L>  |  20  ----------------------------<  104<H>  3.8   |  25  |  3.76<H>  06-17    130<L>  |  91<L>  |  29<H>  ----------------------------<  150<H>  3.7   |  24  |  4.95<H>  06-16    134<L>  |  94<L>  |  19  ----------------------------<  125<H>  4.0   |  26  |  3.80<H>  06-15    128<L>  |  90<L>  |  29<H>  ----------------------------<  120<H>  4.0   |  28  |  5.26<H>    Ca    9.3      18 Jun 2017 07:30  Ca    8.8      17 Jun 2017 07:30      CAPILLARY BLOOD GLUCOSE  103 (18 Jun 2017 08:11)  158 (17 Jun 2017 22:29)  145 (17 Jun 2017 17:06)  142 (17 Jun 2017 11:34)        PT/INR - ( 18 Jun 2017 07:30 )   PT: 20.8 SEC;   INR: 1.83              Cultures:                             Studies  Chest X-RAY  CT SCAN Chest   IMPRESSION:    Limited due to motion. No acute intracranial hemorrhage or mass effect.   Involutional and microvascular-type changes which have progressed from   the prior exam.  Venous Dopplers: LE:   CT Abdomen  Others

## 2017-06-19 LAB
ALBUMIN SERPL ELPH-MCNC: 2.8 G/DL — LOW (ref 3.3–5)
ALP SERPL-CCNC: 180 U/L — HIGH (ref 40–120)
ALT FLD-CCNC: 16 U/L — SIGNIFICANT CHANGE UP (ref 4–41)
AST SERPL-CCNC: 40 U/L — SIGNIFICANT CHANGE UP (ref 4–40)
BASOPHILS # BLD AUTO: 0.03 K/UL — SIGNIFICANT CHANGE UP (ref 0–0.2)
BASOPHILS NFR BLD AUTO: 0.3 % — SIGNIFICANT CHANGE UP (ref 0–2)
BILIRUB SERPL-MCNC: 1.6 MG/DL — HIGH (ref 0.2–1.2)
BUN SERPL-MCNC: 31 MG/DL — HIGH (ref 7–23)
BUN SERPL-MCNC: 33 MG/DL — HIGH (ref 7–23)
CALCIUM SERPL-MCNC: 9.5 MG/DL — SIGNIFICANT CHANGE UP (ref 8.4–10.5)
CALCIUM SERPL-MCNC: 9.6 MG/DL — SIGNIFICANT CHANGE UP (ref 8.4–10.5)
CHLORIDE SERPL-SCNC: 93 MMOL/L — LOW (ref 98–107)
CHLORIDE SERPL-SCNC: 94 MMOL/L — LOW (ref 98–107)
CK MB BLD-MCNC: 4.73 NG/ML — SIGNIFICANT CHANGE UP (ref 1–6.6)
CK SERPL-CCNC: 81 U/L — SIGNIFICANT CHANGE UP (ref 30–200)
CO2 SERPL-SCNC: 21 MMOL/L — LOW (ref 22–31)
CO2 SERPL-SCNC: 25 MMOL/L — SIGNIFICANT CHANGE UP (ref 22–31)
CREAT SERPL-MCNC: 5.03 MG/DL — HIGH (ref 0.5–1.3)
CREAT SERPL-MCNC: 5.51 MG/DL — HIGH (ref 0.5–1.3)
EOSINOPHIL # BLD AUTO: 0.52 K/UL — HIGH (ref 0–0.5)
EOSINOPHIL NFR BLD AUTO: 4.8 % — SIGNIFICANT CHANGE UP (ref 0–6)
GLUCOSE SERPL-MCNC: 118 MG/DL — HIGH (ref 70–99)
GLUCOSE SERPL-MCNC: 135 MG/DL — HIGH (ref 70–99)
HCT VFR BLD CALC: 28.5 % — LOW (ref 39–50)
HCT VFR BLD CALC: 30.7 % — LOW (ref 39–50)
HGB BLD-MCNC: 8.4 G/DL — LOW (ref 13–17)
HGB BLD-MCNC: 8.8 G/DL — LOW (ref 13–17)
IMM GRANULOCYTES NFR BLD AUTO: 0.2 % — SIGNIFICANT CHANGE UP (ref 0–1.5)
INR BLD: 1.57 — HIGH (ref 0.88–1.17)
INR BLD: 24.65 — CRITICAL HIGH (ref 0.88–1.17)
LYMPHOCYTES # BLD AUTO: 3.47 K/UL — HIGH (ref 1–3.3)
LYMPHOCYTES # BLD AUTO: 32.2 % — SIGNIFICANT CHANGE UP (ref 13–44)
MAGNESIUM SERPL-MCNC: 2.3 MG/DL — SIGNIFICANT CHANGE UP (ref 1.6–2.6)
MCHC RBC-ENTMCNC: 28.7 % — LOW (ref 32–36)
MCHC RBC-ENTMCNC: 29.4 PG — SIGNIFICANT CHANGE UP (ref 27–34)
MCHC RBC-ENTMCNC: 29.5 % — LOW (ref 32–36)
MCHC RBC-ENTMCNC: 29.9 PG — SIGNIFICANT CHANGE UP (ref 27–34)
MCV RBC AUTO: 101.4 FL — HIGH (ref 80–100)
MCV RBC AUTO: 102.7 FL — HIGH (ref 80–100)
MONOCYTES # BLD AUTO: 1.36 K/UL — HIGH (ref 0–0.9)
MONOCYTES NFR BLD AUTO: 12.6 % — SIGNIFICANT CHANGE UP (ref 2–14)
NEUTROPHILS # BLD AUTO: 5.38 K/UL — SIGNIFICANT CHANGE UP (ref 1.8–7.4)
NEUTROPHILS NFR BLD AUTO: 49.9 % — SIGNIFICANT CHANGE UP (ref 43–77)
PHOSPHATE SERPL-MCNC: 4.5 MG/DL — SIGNIFICANT CHANGE UP (ref 2.5–4.5)
PLATELET # BLD AUTO: 183 K/UL — SIGNIFICANT CHANGE UP (ref 150–400)
PLATELET # BLD AUTO: 205 K/UL — SIGNIFICANT CHANGE UP (ref 150–400)
PMV BLD: 11.9 FL — SIGNIFICANT CHANGE UP (ref 7–13)
PMV BLD: 12.6 FL — SIGNIFICANT CHANGE UP (ref 7–13)
POTASSIUM SERPL-MCNC: 4.1 MMOL/L — SIGNIFICANT CHANGE UP (ref 3.5–5.3)
POTASSIUM SERPL-MCNC: 4.8 MMOL/L — SIGNIFICANT CHANGE UP (ref 3.5–5.3)
POTASSIUM SERPL-SCNC: 4.1 MMOL/L — SIGNIFICANT CHANGE UP (ref 3.5–5.3)
POTASSIUM SERPL-SCNC: 4.8 MMOL/L — SIGNIFICANT CHANGE UP (ref 3.5–5.3)
PROT SERPL-MCNC: 8 G/DL — SIGNIFICANT CHANGE UP (ref 6–8.3)
PROTHROM AB SERPL-ACNC: 17.8 SEC — HIGH (ref 9.8–13.1)
PROTHROM AB SERPL-ACNC: 294.7 SEC — HIGH (ref 9.8–13.1)
RBC # BLD: 2.81 M/UL — LOW (ref 4.2–5.8)
RBC # BLD: 2.99 M/UL — LOW (ref 4.2–5.8)
RBC # FLD: 23.9 % — HIGH (ref 10.3–14.5)
RBC # FLD: 23.9 % — HIGH (ref 10.3–14.5)
SODIUM SERPL-SCNC: 131 MMOL/L — LOW (ref 135–145)
SODIUM SERPL-SCNC: 134 MMOL/L — LOW (ref 135–145)
TROPONIN T SERPL-MCNC: 0.56 NG/ML — HIGH (ref 0–0.06)
WBC # BLD: 10.78 K/UL — HIGH (ref 3.8–10.5)
WBC # BLD: 8.06 K/UL — SIGNIFICANT CHANGE UP (ref 3.8–10.5)
WBC # FLD AUTO: 10.78 K/UL — HIGH (ref 3.8–10.5)
WBC # FLD AUTO: 8.06 K/UL — SIGNIFICANT CHANGE UP (ref 3.8–10.5)

## 2017-06-19 PROCEDURE — 93010 ELECTROCARDIOGRAM REPORT: CPT

## 2017-06-19 PROCEDURE — 71010: CPT | Mod: 26

## 2017-06-19 RX ORDER — ALTEPLASE 100 MG
2 KIT INTRAVENOUS ONCE
Qty: 0 | Refills: 0 | Status: COMPLETED | OUTPATIENT
Start: 2017-06-19 | End: 2017-06-19

## 2017-06-19 RX ORDER — ONDANSETRON 8 MG/1
4 TABLET, FILM COATED ORAL ONCE
Qty: 0 | Refills: 0 | Status: COMPLETED | OUTPATIENT
Start: 2017-06-19 | End: 2017-06-19

## 2017-06-19 RX ADMIN — BUDESONIDE AND FORMOTEROL FUMARATE DIHYDRATE 2 PUFF(S): 160; 4.5 AEROSOL RESPIRATORY (INHALATION) at 09:12

## 2017-06-19 RX ADMIN — ONDANSETRON 4 MILLIGRAM(S): 8 TABLET, FILM COATED ORAL at 20:45

## 2017-06-19 RX ADMIN — ATORVASTATIN CALCIUM 20 MILLIGRAM(S): 80 TABLET, FILM COATED ORAL at 23:15

## 2017-06-19 RX ADMIN — ALTEPLASE 2 MILLIGRAM(S): KIT at 18:56

## 2017-06-19 RX ADMIN — SEVELAMER CARBONATE 800 MILLIGRAM(S): 2400 POWDER, FOR SUSPENSION ORAL at 12:20

## 2017-06-19 RX ADMIN — Medication 20.85 MICROGRAM(S)/KG/MIN: at 23:17

## 2017-06-19 RX ADMIN — BUDESONIDE AND FORMOTEROL FUMARATE DIHYDRATE 2 PUFF(S): 160; 4.5 AEROSOL RESPIRATORY (INHALATION) at 23:16

## 2017-06-19 RX ADMIN — MIDODRINE HYDROCHLORIDE 30 MILLIGRAM(S): 2.5 TABLET ORAL at 23:15

## 2017-06-19 RX ADMIN — Medication 100 MILLIGRAM(S): at 12:20

## 2017-06-19 RX ADMIN — AMIODARONE HYDROCHLORIDE 200 MILLIGRAM(S): 400 TABLET ORAL at 04:54

## 2017-06-19 RX ADMIN — MIDODRINE HYDROCHLORIDE 30 MILLIGRAM(S): 2.5 TABLET ORAL at 04:54

## 2017-06-19 RX ADMIN — MIDODRINE HYDROCHLORIDE 30 MILLIGRAM(S): 2.5 TABLET ORAL at 15:03

## 2017-06-19 RX ADMIN — Medication 1: at 12:20

## 2017-06-19 RX ADMIN — FINASTERIDE 5 MILLIGRAM(S): 5 TABLET, FILM COATED ORAL at 12:20

## 2017-06-19 RX ADMIN — SEVELAMER CARBONATE 800 MILLIGRAM(S): 2400 POWDER, FOR SUSPENSION ORAL at 09:12

## 2017-06-19 RX ADMIN — LEVETIRACETAM 400 MILLIGRAM(S): 250 TABLET, FILM COATED ORAL at 12:21

## 2017-06-19 RX ADMIN — Medication 75 MICROGRAM(S): at 04:53

## 2017-06-19 RX ADMIN — SEVELAMER CARBONATE 800 MILLIGRAM(S): 2400 POWDER, FOR SUSPENSION ORAL at 17:04

## 2017-06-19 NOTE — CHART NOTE - NSCHARTNOTEFT_GEN_A_CORE
RRT called by nurse for vomiting and unresponsiveness. As per sister at the bedside, Pt vomited about 100 cc of nonbilious milky then became unresponsive for a few seconds.  Rapid response called, upon arrival, pt was responsive and followed commands. As per pt, he had some abdominal pain then had an episode of vomiting,     Vital Signs Last 24 Hrs  T(C): 36.4, Max: 36.9 (-19 @ 00:00)  T(F): 97.5, Max: 98.5 (-19 @ 00:00)  HR: 62 (62 - 87)  BP: 112/84 (88/52 - 116/55)  BP(mean): --  RR: 18 (16 - 18)  SpO2: 95% (94% - 100%)    Allergies: No Known Allergies    MEDICATIONS:  MEDICATIONS  (STANDING):  finasteride 5milliGRAM(s) Oral daily  amiodarone    Tablet 200milliGRAM(s) Oral daily  atorvastatin 20milliGRAM(s) Oral at bedtime  docusate sodium 100milliGRAM(s) Oral daily  midodrine 30milliGRAM(s) Oral every 8 hours  sevelamer hydrochloride 800milliGRAM(s) Oral three times a day with meals  levothyroxine 75MICROGram(s) Oral daily  insulin lispro (HumaLOG) corrective regimen sliding scale  SubCutaneous three times a day before meals  insulin lispro (HumaLOG) corrective regimen sliding scale  SubCutaneous at bedtime  dextrose 5%. 1000milliLiter(s) IV Continuous <Continuous>  dextrose 50% Injectable 12.5Gram(s) IV Push once  dextrose 50% Injectable 25Gram(s) IV Push once  dextrose 50% Injectable 25Gram(s) IV Push once  DOBUTamine Infusion 7MICROgram(s)/kG/Min IV Continuous <Continuous>  acetaminophen   Tablet. 650milliGRAM(s) Oral once  levETIRAcetam  IVPB 500milliGRAM(s) IV Intermittent daily  levETIRAcetam  IVPB 250milliGRAM(s) IV Intermittent daily  buDESOnide 160 MICROgram(s)/formoterol 4.5 MICROgram(s) Inhaler 2Puff(s) Inhalation two times a day  epoetin alba Injectable 71460Higf(s) IV Push <User Schedule>  glycerin Suppository - Adult 1Suppository(s) Rectal once    MEDICATIONS  (PRN):  dextrose Gel 1Dose(s) Oral once PRN Blood Glucose LESS THAN 70 milliGRAM(s)/deciliter  glucagon  Injectable 1milliGRAM(s) IntraMuscular once PRN Glucose LESS THAN 70 milligrams/deciliter  acetaminophen   Tablet 650milliGRAM(s) Oral every 6 hours PRN For Temp greater than 38 C (100.4 F)  acetaminophen    Suspension. 650milliGRAM(s) Oral every 6 hours PRN Moderate Pain (4 - 6)  artificial tears (preservative free) Ophthalmic Solution 1Drop(s) Both EYES three times a day PRN Dry Eyes  sodium chloride 0.65% Nasal 1Spray(s) Both Nostrils four times a day PRN Nasal Congestion  oxyCODONE  5 mG/acetaminophen 325 mG 2Tablet(s) Oral every 6 hours PRN Severe Pain (7 - 10)    12 Lead EKG: No acute changes     LAB RESULTS:                        8.8    10.78 )-----------( 183      ( 2017 20:50 )             30.7         134<L>  |  94<L>  |  33<H>  ----------------------------<  135<H>  4.8   |  25  |  5.51<H>    Ca    9.5      2017 20:50  Phos  4.5       Mg     2.3         TPro  8.0  /  Alb  2.8<L>  /  TBili  1.6<H>  /  DBili  x   /  AST  40  /  ALT  16  /  AlkPhos  180<H>      CARDIAC MARKERS ( 2017 20:50 )  x     / 0.56 ng/mL / 81 u/L / 4.73 ng/mL / x              PT/INR - ( 2017 10:00 )   PT: 17.8 SEC;   INR: 1.57            LIVER FUNCTIONS - ( 2017 20:50 )  Alb: 2.8 g/dL / Pro: 8.0 g/dL / ALK PHOS: 180 u/L / ALT: 16 u/L / AST: 40 u/L / GGT: x           Creatine Kinase, Serum: 81 u/L ( @ 20:50)  CKMB: 4.73 ng/mL ( @ 20:50)  Troponin T, Serum: 0.56 ng/mL <H> ( @ 20:50)       CAPILLARY BLOOD GLUCOSE  135 (2017 21:00)  134 (2017 16:59)  187 (2017 11:40)  107 (2017 08:16)    I&O's Detail  I & Os for 24h ending 2017 07:00  =============================================  IN:    DOBUTamine Infusion: 250 ml    Oral Fluid: 220 ml    Total IN: 470 ml  ---------------------------------------------  OUT:    Total OUT: 0 ml  ---------------------------------------------  Total NET: 470 ml    I & Os for current day (as of 2017 23:48)  =============================================  IN:    Oral Fluid: 780 ml    DOBUTamine Infusion: 240 ml    Solution: 100 ml    Total IN: 1120 ml  ---------------------------------------------  OUT:    Total OUT: 0 ml  ---------------------------------------------  Total NET: 1120 ml    Daily     Daily Weight in k.4 (2017 04:27)    >>> <<<    CARDIAC ENZYMES  Creatine Kinase, Serum:8106 @ 20:50  Troponin T, Serum:0.56<H> @ 20:50  CKMB:4.7306 @ 20:50  CKMB Relative Index: -- @ 20:50      61 yo M with acute on chronic severe systolic CHF (EF 19%), ESRD, DM2, A fib, HCAP    This is a 62 y.o. male with a PmHx of NICM with severe LV dysfunction (EF 19%), s/p Biotronic ICD placement on Dobutamine gtt at home, ESRD on HD M/W/F, atrial fibrillation on Coumadin, COPD on 4L home O2, HLD, DM, chronic hypotension on Midodrine admitted for syncopal episode, Severe non-ischemic cardiomyopathy on Dobutamine gtt, HCAP- ID following (Dr. Medellin), S/P Ertapenem IV x 10 day course, completed , Moderate diffuse slowing on EEG- neuro following, possibly asterixis, on Keppra IV , Extensive pulmonary fibrosis- pulm following, on BIPAP PRN s/p vomiting rapid response called for unresponsiveness.  Pt became responsive spontaneously, and at the end of the rapid pt noted to be slightly hypoxic to 85% on NC. Improved to 93% on 50% Venti max and then became 100%.      R/O Aspiration PNA- CXR done, awaiting result  Aspiration precaution  Zofran 4 mg IVP X 1 given with improvement.    Vital signs Q4H for 24 hours  Patient wants to be full coded   Will hold bipap for now given vomiting.  Will Continue to monitor. Rapid response called by nurse for vomiting and unresponsiveness. As per sister at the bedside, Pt vomited about 100 cc of nonbilious milky then became unresponsive for a few seconds.  Rapid response called, upon arrival, pt was responsive and followed commands. As per pt, he had some abdominal pain then had an episode of vomiting,     Vital Signs Last 24 Hrs  T(C): 36.4, Max: 36.9 (-19 @ 00:00)  T(F): 97.5, Max: 98.5 (19 @ 00:00)  HR: 62 (62 - 87)  BP: 112/84 (88/52 - 116/55)  BP(mean): --  RR: 18 (16 - 18)  SpO2: 95% (94% - 100%)    Allergies: No Known Allergies    MEDICATIONS:  MEDICATIONS  (STANDING):  finasteride 5milliGRAM(s) Oral daily  amiodarone    Tablet 200milliGRAM(s) Oral daily  atorvastatin 20milliGRAM(s) Oral at bedtime  docusate sodium 100milliGRAM(s) Oral daily  midodrine 30milliGRAM(s) Oral every 8 hours  sevelamer hydrochloride 800milliGRAM(s) Oral three times a day with meals  levothyroxine 75MICROGram(s) Oral daily  insulin lispro (HumaLOG) corrective regimen sliding scale  SubCutaneous three times a day before meals  insulin lispro (HumaLOG) corrective regimen sliding scale  SubCutaneous at bedtime  dextrose 5%. 1000milliLiter(s) IV Continuous <Continuous>  dextrose 50% Injectable 12.5Gram(s) IV Push once  dextrose 50% Injectable 25Gram(s) IV Push once  dextrose 50% Injectable 25Gram(s) IV Push once  DOBUTamine Infusion 7MICROgram(s)/kG/Min IV Continuous <Continuous>  acetaminophen   Tablet. 650milliGRAM(s) Oral once  levETIRAcetam  IVPB 500milliGRAM(s) IV Intermittent daily  levETIRAcetam  IVPB 250milliGRAM(s) IV Intermittent daily  buDESOnide 160 MICROgram(s)/formoterol 4.5 MICROgram(s) Inhaler 2Puff(s) Inhalation two times a day  epoetin alba Injectable 91381Vejo(s) IV Push <User Schedule>  glycerin Suppository - Adult 1Suppository(s) Rectal once    MEDICATIONS  (PRN):  dextrose Gel 1Dose(s) Oral once PRN Blood Glucose LESS THAN 70 milliGRAM(s)/deciliter  glucagon  Injectable 1milliGRAM(s) IntraMuscular once PRN Glucose LESS THAN 70 milligrams/deciliter  acetaminophen   Tablet 650milliGRAM(s) Oral every 6 hours PRN For Temp greater than 38 C (100.4 F)  acetaminophen    Suspension. 650milliGRAM(s) Oral every 6 hours PRN Moderate Pain (4 - 6)  artificial tears (preservative free) Ophthalmic Solution 1Drop(s) Both EYES three times a day PRN Dry Eyes  sodium chloride 0.65% Nasal 1Spray(s) Both Nostrils four times a day PRN Nasal Congestion  oxyCODONE  5 mG/acetaminophen 325 mG 2Tablet(s) Oral every 6 hours PRN Severe Pain (7 - 10)    12 Lead EKG: No acute changes     LAB RESULTS:                        8.8    10.78 )-----------( 183      ( 2017 20:50 )             30.7         134<L>  |  94<L>  |  33<H>  ----------------------------<  135<H>  4.8   |  25  |  5.51<H>    Ca    9.5      2017 20:50  Phos  4.5       Mg     2.3         TPro  8.0  /  Alb  2.8<L>  /  TBili  1.6<H>  /  DBili  x   /  AST  40  /  ALT  16  /  AlkPhos  180<H>      CARDIAC MARKERS ( 2017 20:50 )  x     / 0.56 ng/mL / 81 u/L / 4.73 ng/mL / x              PT/INR - ( 2017 10:00 )   PT: 17.8 SEC;   INR: 1.57            LIVER FUNCTIONS - ( 2017 20:50 )  Alb: 2.8 g/dL / Pro: 8.0 g/dL / ALK PHOS: 180 u/L / ALT: 16 u/L / AST: 40 u/L / GGT: x           Creatine Kinase, Serum: 81 u/L ( @ 20:50)  CKMB: 4.73 ng/mL ( @ 20:50)  Troponin T, Serum: 0.56 ng/mL <H> ( @ 20:50)       CAPILLARY BLOOD GLUCOSE  135 (2017 21:00)  134 (2017 16:59)  187 (2017 11:40)  107 (2017 08:16)    I&O's Detail  I & Os for 24h ending 2017 07:00  =============================================  IN:    DOBUTamine Infusion: 250 ml    Oral Fluid: 220 ml    Total IN: 470 ml  ---------------------------------------------  OUT:    Total OUT: 0 ml  ---------------------------------------------  Total NET: 470 ml    I & Os for current day (as of 2017 23:48)  =============================================  IN:    Oral Fluid: 780 ml    DOBUTamine Infusion: 240 ml    Solution: 100 ml    Total IN: 1120 ml  ---------------------------------------------  OUT:    Total OUT: 0 ml  ---------------------------------------------  Total NET: 1120 ml    Daily     Daily Weight in k.4 (2017 04:27)    >>> <<<    CARDIAC ENZYMES  Creatine Kinase, Serum:8106 @ 20:50  Troponin T, Serum:0.56<H> @ 20:50  CKMB:4.7306-19 @ 20:50  CKMB Relative Index: -- @ 20:50      63 yo M with acute on chronic severe systolic CHF (EF 19%), ESRD, DM2, A fib, HCAP    This is a 62 y.o. male with a PmHx of NICM with severe LV dysfunction (EF 19%), s/p Biotronic ICD placement on Dobutamine gtt at home, ESRD on HD M/W/F, atrial fibrillation on Coumadin, COPD on 4L home O2, HLD, DM, chronic hypotension on Midodrine admitted for syncopal episode, Severe non-ischemic cardiomyopathy on Dobutamine gtt, HCAP- ID following (Dr. Medellin), S/P Ertapenem IV x 10 day course, completed , Moderate diffuse slowing on EEG- neuro following, possibly asterixis, on Keppra IV , Extensive pulmonary fibrosis- pulm following, on BIPAP PRN s/p vomiting rapid response called for unresponsiveness.  Pt became responsive spontaneously, and at the end of the rapid pt noted to be slightly hypoxic to 85% on NC. Improved to 93% on 50% Venti max and then became 100%.      R/O Aspiration PNA- CXR done, awaiting result  Aspiration precaution  Zofran 4 mg IVP X 1 given with improvement.    Vital signs Q4H for 24 hours  Chest PT four times a day ordered  Suction PRN Ordered   Patient wants to be full coded   Will hold Bipap for now given vomiting.  Will Continue to monitor.

## 2017-06-19 NOTE — PROGRESS NOTE ADULT - PROBLEM SELECTOR PLAN 2
on dobutamine drip and amiodarone: Also patient has pulmonary fibrosis and has been on amiodarone! Discussed with this wife :

## 2017-06-19 NOTE — PROGRESS NOTE ADULT - SUBJECTIVE AND OBJECTIVE BOX
Patient seen and examined  no complaints. feeling better today  denies f/v/d/c/sob/cp. on BiPAP last night, now off. comfortable. NAD    No Known Allergies    Hospital Medications:   MEDICATIONS  (STANDING):  finasteride 5milliGRAM(s) Oral daily  amiodarone    Tablet 200milliGRAM(s) Oral daily  atorvastatin 20milliGRAM(s) Oral at bedtime  docusate sodium 100milliGRAM(s) Oral daily  midodrine 30milliGRAM(s) Oral every 8 hours  sevelamer hydrochloride 800milliGRAM(s) Oral three times a day with meals  levothyroxine 75MICROGram(s) Oral daily  insulin lispro (HumaLOG) corrective regimen sliding scale  SubCutaneous three times a day before meals  insulin lispro (HumaLOG) corrective regimen sliding scale  SubCutaneous at bedtime  dextrose 5%. 1000milliLiter(s) IV Continuous <Continuous>  dextrose 50% Injectable 12.5Gram(s) IV Push once  dextrose 50% Injectable 25Gram(s) IV Push once  dextrose 50% Injectable 25Gram(s) IV Push once  DOBUTamine Infusion 7MICROgram(s)/kG/Min IV Continuous <Continuous>  acetaminophen   Tablet. 650milliGRAM(s) Oral once  levETIRAcetam  IVPB 500milliGRAM(s) IV Intermittent daily  levETIRAcetam  IVPB 250milliGRAM(s) IV Intermittent daily  buDESOnide 160 MICROgram(s)/formoterol 4.5 MICROgram(s) Inhaler 2Puff(s) Inhalation two times a day  epoetin alba Injectable 83929Uses(s) IV Push <User Schedule>      VITALS:  Vital Signs Last 24 Hrs  T(C): 36.6, Max: 37.1 (06-18 @ 13:28)  T(F): 97.9, Max: 98.8 (06-18 @ 13:28)  HR: 77 (70 - 80)  BP: 88/52 (88/52 - 105/58)  BP(mean): --  RR: 18 (18 - 18)  SpO2: 94% (92% - 96%)    I&O's Summary    I & Os for current day (as of 19 Jun 2017 12:18)  =============================================  IN: 470 ml / OUT: 0 ml / NET: 470 ml    PHYSICAL EXAM:  Constitutional: NAD  HEENT: anicteric sclera, oropharynx clear, MMM  Neck: No JVD  Respiratory: Bibasilar crepts+  Cardiovascular: S1, S2, RRR  Gastrointestinal: BS+, soft, NT/ND  Extremities: No cyanosis or clubbing. Trace LE peripheral edema b/l  Neurological: A/O x 2, no focal deficits  Psychiatric: Normal mood, normal affect  : No CVA tenderness. No rosa.   Skin: No rashes  Vascular Access: RIJ Tunneled cath     LABS:  06-19    131<L>  |  93<L>  |  31<H>  ----------------------------<  118<H>  4.1   |  21<L>  |  5.03<H>    Ca    9.6      19 Jun 2017 06:11                          8.4    8.06  )-----------( 205      ( 19 Jun 2017 06:11 )             28.5

## 2017-06-19 NOTE — PROGRESS NOTE ADULT - SUBJECTIVE AND OBJECTIVE BOX
Dr Malcolm    Patient is a 62y old  Male who presents with a chief complaint of sob (09 May 2017 15:32)    comfortable overnight  no cough or phlegm  has SOB   uses night time Bipap :10/5 : 40%    RA: CONSISTENT WITH RA: HE FAILED STEROIDS: WAS TRIED FOR 6 WEEKS  SEEN SEVERAL DIFFERENT RHEUMATOLOGIST  HAD HAD EXTENSIVE SEVERAL CONSULTS          Any change in ROS: none    MEDICATIONS  (STANDING):  finasteride 5milliGRAM(s) Oral daily  amiodarone    Tablet 200milliGRAM(s) Oral daily  atorvastatin 20milliGRAM(s) Oral at bedtime  docusate sodium 100milliGRAM(s) Oral daily  midodrine 30milliGRAM(s) Oral every 8 hours  sevelamer hydrochloride 800milliGRAM(s) Oral three times a day with meals  levothyroxine 75MICROGram(s) Oral daily  insulin lispro (HumaLOG) corrective regimen sliding scale  SubCutaneous three times a day before meals  insulin lispro (HumaLOG) corrective regimen sliding scale  SubCutaneous at bedtime  dextrose 5%. 1000milliLiter(s) IV Continuous <Continuous>  dextrose 50% Injectable 12.5Gram(s) IV Push once  dextrose 50% Injectable 25Gram(s) IV Push once  dextrose 50% Injectable 25Gram(s) IV Push once  DOBUTamine Infusion 7MICROgram(s)/kG/Min IV Continuous <Continuous>  acetaminophen   Tablet. 650milliGRAM(s) Oral once  levETIRAcetam  IVPB 500milliGRAM(s) IV Intermittent daily  levETIRAcetam  IVPB 250milliGRAM(s) IV Intermittent daily  buDESOnide 160 MICROgram(s)/formoterol 4.5 MICROgram(s) Inhaler 2Puff(s) Inhalation two times a day  epoetin alba Injectable 36969Tiky(s) IV Push <User Schedule>  glycerin Suppository - Adult 1Suppository(s) Rectal once    MEDICATIONS  (PRN):  dextrose Gel 1Dose(s) Oral once PRN Blood Glucose LESS THAN 70 milliGRAM(s)/deciliter  glucagon  Injectable 1milliGRAM(s) IntraMuscular once PRN Glucose LESS THAN 70 milligrams/deciliter  acetaminophen   Tablet 650milliGRAM(s) Oral every 6 hours PRN For Temp greater than 38 C (100.4 F)  acetaminophen    Suspension. 650milliGRAM(s) Oral every 6 hours PRN Moderate Pain (4 - 6)  artificial tears (preservative free) Ophthalmic Solution 1Drop(s) Both EYES three times a day PRN Dry Eyes  sodium chloride 0.65% Nasal 1Spray(s) Both Nostrils four times a day PRN Nasal Congestion  oxyCODONE  5 mG/acetaminophen 325 mG 2Tablet(s) Oral every 6 hours PRN Severe Pain (7 - 10)    Vital Signs Last 24 Hrs  T(C): 36.1, Max: 37.1 (18 @ 13:28)  T(F): 96.9, Max: 98.8 (-18 @ 13:28)  HR: 72 (70 - 80)  BP: 116/55 (88/52 - 116/55)  BP(mean): --  RR: 18 (18 - 18)  SpO2: 98% (92% - 98%)    I&O's Summary    I & Os for current day (as of 2017 12:43)  =============================================  IN: 470 ml / OUT: 0 ml / NET: 470 ml        Physical Exam:   GENERAL: NAD, well-groomed, well-developed  HEENT: BELL/   Atraumatic, Normocephalic  ENMT: No tonsillar erythema, exudates, or enlargement; Moist mucous membranes, Good dentition, No lesions  NECK: Supple, No JVD, Normal thyroid  CHEST/LUNG: crackles bilaterally  CVS: Regular rate and rhythm; No murmurs, rubs, or gallops  GI: : Soft, Nontender, Nondistended; Bowel sounds present  NERVOUS SYSTEM:  Alert & Oriented X3, Good concentration; Motor Strength 5/5 B/L upper and lower extremities; DTRs 2+ intact and symmetric  EXTREMITIES:  2+ Peripheral Pulses, No clubbing, cyanosis, or edema  LYMPH: No lymphadenopathy noted  SKIN: No rashes or lesions  ENDOCRINOLOGY: No Thyromegaly  PSYCH: Appropriate    Labs:                              8.4    8.06  )-----------( 205      ( 2017 06:11 )             28.5                         8.3    8.47  )-----------( 231      ( 2017 07:30 )             28.0                         8.1    8.44  )-----------( 221      ( 2017 07:30 )             27.3                         7.9    9.58  )-----------( 221      ( 2017 06:30 )             27.1     06-19    131<L>  |  93<L>  |  31<H>  ----------------------------<  118<H>  4.1   |  21<L>  |  5.03<H>  06-18    134<L>  |  94<L>  |  20  ----------------------------<  104<H>  3.8   |  25  |  3.76<H>  06-17    130<L>  |  91<L>  |  29<H>  ----------------------------<  150<H>  3.7   |  24  |  4.95<H>  06-16    134<L>  |  94<L>  |  19  ----------------------------<  125<H>  4.0   |  26  |  3.80<H>    Ca    9.6      2017 06:11  Ca    9.3      2017 07:30      CAPILLARY BLOOD GLUCOSE  187 (2017 11:40)  107 (2017 08:16)  170 (2017 21:17)  151 (2017 17:29)        PT/INR - ( 2017 10:00 )   PT: 17.8 SEC;   INR: 1.57          Studies  Chest X-RAY  CT SCAN Chest IMPRESSION:    Extensive pulmonary fibrosis.    Evidence of superimposed mild small airway disease.  Venous Dopplers: LE:   CT Abdomen  Others    CONCLUSIONS:  1. Mild left atrial enlargement. LA volume index = 34  cc/m2.  2. Normal left ventricular internal dimensions and wall  thicknesses.  3. Severe global left ventricular systolic dysfunction.  4. Severe diastolic dysfunction.  5. Moderate right atrial enlargement.  6. A device wire is noted in the right heart. Right  ventricular enlargement withdecreased right ventricular  systolic function.  7. Normal tricuspid valve.    Moderate tricuspid  regurgitation.  8. Estimated pulmonary artery systolic pressure equals 30  mm Hg, assuming right atrial pressure equals 10  mm Hg,  consistent with normal pulmonary pressures.  ------------------------------------------------------------------------  Confirmed on  2017 - 18:34:27 by KEITH Cooper  ------------------------------------------------------------------------

## 2017-06-19 NOTE — PROGRESS NOTE ADULT - ASSESSMENT
63 yo Male  with acute on chronic severe systolic CHF (EF 19%), ESRD, DM2, A fib, HCAP  - HCAP -s/p abs  aocd  - Pulmonary fibrosis, COPD - c/w inhalers, no benefit of steroids  - Acute on chronic systolic CHF - continue inotropic support as per Cardio, HD for maximum fluid removal  - ESRD - continue HD as per Renal, continue Midodrine.  - A fib - continue Amio,   - DM2 - controlled, continue with ISS  - LLE decreased ROM -imaging w/ hematomas  sx eval noted  no sx intervention  hold a/c for now  analgesics  pt  discussed w/ pt family at bedside  d/c planning to [place] vs home  cpap as per pulm   prognosis poor

## 2017-06-19 NOTE — SWALLOW BEDSIDE ASSESSMENT ADULT - SWALLOW EVAL: DIAGNOSIS
Patient presents with functional oral and pharyngeal stage swallowing mechanism characterized by adequate oral containment, difficulty chewing dry solids due to gum soreness, adequate bolus manipulation and transport with adequate oral clearance. There is laryngeal elevation upon palpation, initiation of the pharyngeal swallow. There were no overt signs of impaired airway protection for PO trials and oxygen saturation on nasal cannula maintained above 95%.
1. The patient demonstrated functional oral management of puree and thin liquid textures marked by adequate bolus collection, transfer, and posterior transport. 2. The patient demonstrated a mild oral dysphagia for regular solid textures marked by prolonged oral manipulation and decreased mastication abilities resulting in delayed bolus collection, transfer, and posterior transport. Mild lingual residue noted post deglutition which cleared with a thin liquid wash. 3. The patient demonstrated a functional pharyngeal phase of the swallow for puree, solid, and thin liquid textures marked by timely swallow trigger and adequate and complete hyolaryngeal excursion upon digital palpation without any evidence of airway penetration.

## 2017-06-19 NOTE — PROGRESS NOTE ADULT - PROBLEM SELECTOR PLAN 1
no indication for HD/PUF today. Routine HD tomorrow w/2k bath, net uf 2.5kg as tolerated, low bath temp, Midodrine prehd  trend bmp  c/w renal diet, fluid restriction 1L/day   Very poor prognosis considering comorbidities

## 2017-06-19 NOTE — SWALLOW BEDSIDE ASSESSMENT ADULT - SWALLOW EVAL: RECOMMENDED FEEDING/EATING TECHNIQUES
oral hygiene/maintain upright posture during/after eating for 30 mins/small sips/bites/position upright (90 degrees)/allow for swallow between intakes/Monitor oxygen saturation during meals to ensure patient maintain adequate oxygenation
position upright (90 degrees)/small sips/bites/allow for swallow between intakes/maintain upright posture during/after eating for 30 mins/oral hygiene

## 2017-06-19 NOTE — SWALLOW BEDSIDE ASSESSMENT ADULT - ASR SWALLOW ASPIRATION MONITOR
fever/position upright (90Y)/change of breathing pattern/pneumonia/throat clearing/gurgly voice/cough/upper respiratory infection/oral hygiene

## 2017-06-19 NOTE — SWALLOW BEDSIDE ASSESSMENT ADULT - ORAL PHASE
Within functional limits Decreased anterior-posterior movement of the bolus/Delayed oral transit time

## 2017-06-19 NOTE — PROGRESS NOTE ADULT - SUBJECTIVE AND OBJECTIVE BOX
CHIEF COMPLAINT:Patient is a 62y old  Male who presents with a chief complaint of sob (09 May 2017 15:32)    	no change         PAST MEDICAL & SURGICAL HISTORY:  Chronic hypotension  AF (atrial fibrillation)  COPD (chronic obstructive pulmonary disease): 4L home O2  HLD (hyperlipidemia)  DM (diabetes mellitus)  ESRD (end stage renal disease) on dialysis  BPH (benign prostatic hypertrophy)  Myocardial infarction: 10/2011  Chronic renal insufficiency  Gout  Dyslipidemia  Diabetes mellitus  CHF (congestive heart failure)  AICD (automatic cardioverter/defibrillator) present: Biotronic - placed 9/11/09  H/O coronary angiogram          REVIEW OF SYSTEMS:  CONSTITUTIONAL: weak  EYES: No eye pain, visual disturbances, or discharge  NECK: No pain or stiffness  RESPIRATORY: sob/flores no change  CARDIOVASCULAR: No chest pain, palpitations, passing out, dizziness, or leg swelling  GASTROINTESTINAL: No abdominal or epigastric pain. No nausea, vomiting, or hematemesis; No diarrhea or constipation. No melena or hematochezia.  GENITOURINARY: No dysuria, frequency, hematuria, or incontinence  NEUROLOGICAL: No headaches, memory loss, loss of strength, numbness, or tremors  SKIN: No itching, burning, rashes, or lesions   LYMPH Nodes: No enlarged glands  ENDOCRINE: No heat or cold intolerance; No hair loss  MUSCULOSKELETAL: No joint pain or swelling; No muscle, back, or extremity pain    Medications:  MEDICATIONS  (STANDING):  finasteride 5milliGRAM(s) Oral daily  amiodarone    Tablet 200milliGRAM(s) Oral daily  atorvastatin 20milliGRAM(s) Oral at bedtime  docusate sodium 100milliGRAM(s) Oral daily  midodrine 30milliGRAM(s) Oral every 8 hours  sevelamer hydrochloride 800milliGRAM(s) Oral three times a day with meals  levothyroxine 75MICROGram(s) Oral daily  insulin lispro (HumaLOG) corrective regimen sliding scale  SubCutaneous three times a day before meals  insulin lispro (HumaLOG) corrective regimen sliding scale  SubCutaneous at bedtime  dextrose 5%. 1000milliLiter(s) IV Continuous <Continuous>  dextrose 50% Injectable 12.5Gram(s) IV Push once  dextrose 50% Injectable 25Gram(s) IV Push once  dextrose 50% Injectable 25Gram(s) IV Push once  DOBUTamine Infusion 7MICROgram(s)/kG/Min IV Continuous <Continuous>  acetaminophen   Tablet. 650milliGRAM(s) Oral once  levETIRAcetam  IVPB 500milliGRAM(s) IV Intermittent daily  levETIRAcetam  IVPB 250milliGRAM(s) IV Intermittent daily  buDESOnide 160 MICROgram(s)/formoterol 4.5 MICROgram(s) Inhaler 2Puff(s) Inhalation two times a day  epoetin alba Injectable 20054Sebc(s) IV Push <User Schedule>  glycerin Suppository - Adult 1Suppository(s) Rectal once    MEDICATIONS  (PRN):  dextrose Gel 1Dose(s) Oral once PRN Blood Glucose LESS THAN 70 milliGRAM(s)/deciliter  glucagon  Injectable 1milliGRAM(s) IntraMuscular once PRN Glucose LESS THAN 70 milligrams/deciliter  acetaminophen   Tablet 650milliGRAM(s) Oral every 6 hours PRN For Temp greater than 38 C (100.4 F)  acetaminophen    Suspension. 650milliGRAM(s) Oral every 6 hours PRN Moderate Pain (4 - 6)  artificial tears (preservative free) Ophthalmic Solution 1Drop(s) Both EYES three times a day PRN Dry Eyes  sodium chloride 0.65% Nasal 1Spray(s) Both Nostrils four times a day PRN Nasal Congestion  oxyCODONE  5 mG/acetaminophen 325 mG 2Tablet(s) Oral every 6 hours PRN Severe Pain (7 - 10)    	    PHYSICAL EXAM:  T(C): 36.1, Max: 37.1 (06-18 @ 13:28)  HR: 72 (70 - 80)  BP: 116/55 (88/52 - 116/55)  RR: 18 (18 - 18)  SpO2: 98% (92% - 98%)  Wt(kg): --  I&O's Summary    I & Os for current day (as of 19 Jun 2017 12:39)  =============================================  IN: 470 ml / OUT: 0 ml / NET: 470 ml      Appearance: Normal	  HEENT:   Normal oral mucosa, PERRL, EOMI	  Lymphatic: No lymphadenopathy  Cardiovascular: Normal S1 S2, No JVD, No murmurs, No edema  Respiratory: Lungs clear to auscultation	  Psychiatry: A & O x 3, Mood & affect appropriate  Gastrointestinal:  Soft, Non-tender, + BS	  Skin: No rashes, No ecchymoses, No cyanosis	  Neurologic: Non-focal  Extremities: Normal range of motion, No clubbing, cyanosis or edema  Vascular: Peripheral pulses palpable 2+ bilaterally    TELEMETRY: 	    ECG:  	  RADIOLOGY:  OTHER: 	  	  LABS:	 	    CARDIAC MARKERS:                                8.4    8.06  )-----------( 205      ( 19 Jun 2017 06:11 )             28.5     06-19    131<L>  |  93<L>  |  31<H>  ----------------------------<  118<H>  4.1   |  21<L>  |  5.03<H>    Ca    9.6      19 Jun 2017 06:11      proBNP:   Lipid Profile:   HgA1c:   TSH:

## 2017-06-19 NOTE — CHART NOTE - NSCHARTNOTEFT_GEN_A_CORE
RRT Note  RRT called by nurse for vomiting and unresponsiveness. Pt vomited and family member stated pt was unresponsive. Vomit was non bloody and milky. Vitals stable on arrival. Pt was reponsive and followed commands. No events on tele, EKG WNL, labs sent. CXR reviewed ?increased pleural effusion. On exam, lungs mostly clear no wheezing. However, towards the end of the rapid pt noted to be slightly hypoxic to 85% on NC. Improved to 93% on 50% Venti max and then became 100%. Tele PA at bedside and will follow up labs and monitor vitals q4hr. Will hold bipap for now given vomiting.    LANDEN Gunderson

## 2017-06-19 NOTE — SWALLOW BEDSIDE ASSESSMENT ADULT - ADDITIONAL RECOMMENDATIONS
Monitor oxygen saturation during oral intake, if patient's respiratory is not stable, do not feed the patient; as a compromised respiratory status can increase risk for aspiration.
This department to follow during this admission as schedule permits.

## 2017-06-19 NOTE — SWALLOW BEDSIDE ASSESSMENT ADULT - COMMENTS
Patient is a 61 y/o male with a PMHx of NICM with severe LV dysfunction (EF 19%) S/P Biotronic ICD placement on Dobutamine gtt at home, ESRD on HD M/W/F, atrial fibrillation on Coumadin, COPD on 4L home O2, HLD, DM, chronic hypotension on Midodrine, presents to ED S/P syncopal episode admitted to [facility] on 5/9/2017.   + Syncope-ICD interrogation normal, likely hypotension induced; + Acute on chronic combined systolic and diastolic left and right sided CHF exacerbation- renal following (Sophie) for HD set up, HF team following ( End Stage) ; +Severe non ischemic cardiomyopathy- dobutamine gtt; +ESRD - on daily HD as of 5/16; +HCAP: id following, on cefepime, flagyl and vanc by level.    Patient has had 3 RRT called due to unresponsiveness and hypotension on 5/24 and 5/25.  On 5/31, Palliative called to discuss GOC.     Patient was initially seen for a clinical swallow eval on 5/31/2017 (See Consult for details) but it was deferred given patient was desaturating on Nasal Cannula status and had to be placed back on Venitmask.     Patient seen at bedside on Ventimask at 40% and changed to Nasal Cannula with 6 Liters of Oxygen by Nursing. Patient's oxygen saturation maintained above 95% with nasal cannula in place to participate for clinical swallow evaluation.  During the swallowing evaluation, patient's oxygen stats did not desaturate (estimated 10 minutes).  Nursing monitoring and keeping patient on Nasal Cannula to extend tolerance.
Patient is a 63 y/o male with a PMHx of NICM with severe LV dysfunction (EF 19%) S/P Biotronic ICD placement on Dobutamine gtt at home, ESRD on HD M/W/F, atrial fibrillation on Coumadin, COPD on 4L home O2, HLD, DM, chronic hypotension on Midodrine, presents to ED S/P syncopal episode admitted to [facility] on 5/9/2017.    + Syncope-ICD interrogation normal, likely hypotension induced; + Acute on chronic combined systolic and diastolic left and right sided CHF exacerbation- renal following (Sophie) for HD set up, HF team following ( End Stage) ; +Severe non ischemic cardiomyopathy- dobutamine gtt; +ESRD - on daily HD as of 5/16; +HCAP: id following, on cefepime, flagyl and vanc by level.    Patient has had 3 RRT called due to unresponsiveness and hypotension on 5/24 and 5/25.  On 5/31, Palliative called to discuss GOC.     Patient was on BiPap 100% this morning and changed to Ventimask at 50% with O2 saturation at 100%.  Clinical Swallow Evaluation ordered, Nursing changes Ventimask to Nasal Cannula at 6 Liters of Oxygen but patient began to desaturate to low 80s within a couple of minutes and had to be changed back to Ventimask and recovered his oxygen saturation back to 100%.       Given patient's respiratory is compromise and patient not tolerating nasal cannula status, a clinical swallow evaluation is deferred at this time, as PO intake will increase risk for aspiration due to respiratory is compromised at this time.     This service will reattempt clinical swallow evaluation once patient achieves nasal cannula status and respiratory is stable to assess for candidacy of least restrictive diet.     Patient' sister was present during this attempt for a clinical swallow evaluation.      Nursing assisted with oxygen monitoring and changing back patient to ventimask which patient was able to recover from desaturation when placed on nasal cannula.
The patient was seen this afternoon for re-assessment of swallow function, at which time patient was alert and communicative. Patient currently with supplemental O2 via NC in place. Patient's family present at bedside throughout this evaluation and stated that the patient was downgraded to a dysphagia diet, therefore, they requested a repeat swallow evaluation for possible diet upgrade to solids. The patient is known to this department as he was seen for a clinical bedside swallow evaluation on 6/1/17 (please see full report for details), at which time a mechanical soft solid with thin liquid diet was recommended. Per charting, the patient is a 61 y/o male with a PMHx significant of "NICM with severe LV dysfunction (EF 19%), s/p Biotronic ICD placement on Dobutamine gtt at home, ESRD on HD M/W/F, atrial fibrillation on Coumadin, COPD on 4L home O2, HLD, DM, chronic hypotension on Midodrine, presents to ED S/P syncopal episode." Discussed results and recommendations from this evaluation with the patient, patient's family, and TELE PA at pager #[phone].

## 2017-06-19 NOTE — PROGRESS NOTE ADULT - PROBLEM SELECTOR PLAN 3
no real treatment, cont bipap overnight: as patient seems to get help from it per family. He had bx, and it was consistent with rheumatoid arthritis: failed steroids and developed side effects of cellcept

## 2017-06-19 NOTE — PROGRESS NOTE ADULT - ASSESSMENT
63 yo M with acute on chronic severe systolic CHF (EF 19%), ESRD, DM2, A fib, HCAP  s/p acute resp distress with accidental choking on Dentures   have been doing reasonably OK at this time.   ILEOPSOAS HEMATOMA NOTED

## 2017-06-19 NOTE — PROGRESS NOTE ADULT - ASSESSMENT
Patient is a 62y Male with congestive heart failure , End Stage Renal Disease on Dialysis ,pulm edema, pulm fibrosis, resp failure on BiPAP, s/p seizure, w/persistent hypotension,  1.	End Stage Renal Disease on Dialysis - k wnl, hypervolemia improving. s/p HD 6/17/17  2.	Hypotension -asymptomatic; tolerating well   3.	Hyponatremia- 2/2 dilutional, improving  4.	Anemia of renal dis- Hb improving

## 2017-06-20 LAB
BASE EXCESS BLDA CALC-SCNC: -0.3 MMOL/L — SIGNIFICANT CHANGE UP
BASOPHILS # BLD AUTO: 0.02 K/UL — SIGNIFICANT CHANGE UP (ref 0–0.2)
BASOPHILS NFR BLD AUTO: 0.3 % — SIGNIFICANT CHANGE UP (ref 0–2)
BUN SERPL-MCNC: 37 MG/DL — HIGH (ref 7–23)
CALCIUM SERPL-MCNC: 9.3 MG/DL — SIGNIFICANT CHANGE UP (ref 8.4–10.5)
CHLORIDE SERPL-SCNC: 93 MMOL/L — LOW (ref 98–107)
CO2 SERPL-SCNC: 24 MMOL/L — SIGNIFICANT CHANGE UP (ref 22–31)
CREAT SERPL-MCNC: 6.05 MG/DL — HIGH (ref 0.5–1.3)
EOSINOPHIL # BLD AUTO: 0.35 K/UL — SIGNIFICANT CHANGE UP (ref 0–0.5)
EOSINOPHIL NFR BLD AUTO: 5 % — SIGNIFICANT CHANGE UP (ref 0–6)
GLUCOSE SERPL-MCNC: 81 MG/DL — SIGNIFICANT CHANGE UP (ref 70–99)
HBV SURFACE AG SER-ACNC: NEGATIVE — SIGNIFICANT CHANGE UP
HCO3 BLDA-SCNC: 24 MMOL/L — SIGNIFICANT CHANGE UP (ref 22–26)
HCT VFR BLD CALC: 27.3 % — LOW (ref 39–50)
HGB BLD-MCNC: 8.2 G/DL — LOW (ref 13–17)
IMM GRANULOCYTES NFR BLD AUTO: 0.1 % — SIGNIFICANT CHANGE UP (ref 0–1.5)
INR BLD: 1.48 — HIGH (ref 0.88–1.17)
LYMPHOCYTES # BLD AUTO: 1.33 K/UL — SIGNIFICANT CHANGE UP (ref 1–3.3)
LYMPHOCYTES # BLD AUTO: 18.9 % — SIGNIFICANT CHANGE UP (ref 13–44)
MAGNESIUM SERPL-MCNC: 2.1 MG/DL — SIGNIFICANT CHANGE UP (ref 1.6–2.6)
MCHC RBC-ENTMCNC: 30 % — LOW (ref 32–36)
MCHC RBC-ENTMCNC: 30.3 PG — SIGNIFICANT CHANGE UP (ref 27–34)
MCV RBC AUTO: 100.7 FL — HIGH (ref 80–100)
MONOCYTES # BLD AUTO: 0.71 K/UL — SIGNIFICANT CHANGE UP (ref 0–0.9)
MONOCYTES NFR BLD AUTO: 10.1 % — SIGNIFICANT CHANGE UP (ref 2–14)
NEUTROPHILS # BLD AUTO: 4.6 K/UL — SIGNIFICANT CHANGE UP (ref 1.8–7.4)
NEUTROPHILS NFR BLD AUTO: 65.6 % — SIGNIFICANT CHANGE UP (ref 43–77)
PCO2 BLDA: 39 MMHG — SIGNIFICANT CHANGE UP (ref 35–48)
PH BLDA: 7.41 PH — SIGNIFICANT CHANGE UP (ref 7.35–7.45)
PLATELET # BLD AUTO: 209 K/UL — SIGNIFICANT CHANGE UP (ref 150–400)
PMV BLD: 11.9 FL — SIGNIFICANT CHANGE UP (ref 7–13)
PO2 BLDA: 174 MMHG — HIGH (ref 83–108)
POTASSIUM SERPL-MCNC: 4.4 MMOL/L — SIGNIFICANT CHANGE UP (ref 3.5–5.3)
POTASSIUM SERPL-SCNC: 4.4 MMOL/L — SIGNIFICANT CHANGE UP (ref 3.5–5.3)
PROTHROM AB SERPL-ACNC: 16.7 SEC — HIGH (ref 9.8–13.1)
RBC # BLD: 2.71 M/UL — LOW (ref 4.2–5.8)
RBC # FLD: 23.6 % — HIGH (ref 10.3–14.5)
SAO2 % BLDA: 100 % — HIGH (ref 95–99)
SODIUM SERPL-SCNC: 132 MMOL/L — LOW (ref 135–145)
WBC # BLD: 7.02 K/UL — SIGNIFICANT CHANGE UP (ref 3.8–10.5)
WBC # FLD AUTO: 7.02 K/UL — SIGNIFICANT CHANGE UP (ref 3.8–10.5)

## 2017-06-20 RX ADMIN — BUDESONIDE AND FORMOTEROL FUMARATE DIHYDRATE 2 PUFF(S): 160; 4.5 AEROSOL RESPIRATORY (INHALATION) at 09:07

## 2017-06-20 RX ADMIN — LEVETIRACETAM 400 MILLIGRAM(S): 250 TABLET, FILM COATED ORAL at 17:55

## 2017-06-20 RX ADMIN — Medication 20.85 MICROGRAM(S)/KG/MIN: at 12:28

## 2017-06-20 RX ADMIN — MIDODRINE HYDROCHLORIDE 30 MILLIGRAM(S): 2.5 TABLET ORAL at 20:54

## 2017-06-20 RX ADMIN — LEVETIRACETAM 400 MILLIGRAM(S): 250 TABLET, FILM COATED ORAL at 12:27

## 2017-06-20 RX ADMIN — Medication 100 MILLIGRAM(S): at 12:30

## 2017-06-20 RX ADMIN — ERYTHROPOIETIN 20000 UNIT(S): 10000 INJECTION, SOLUTION INTRAVENOUS; SUBCUTANEOUS at 10:50

## 2017-06-20 RX ADMIN — FINASTERIDE 5 MILLIGRAM(S): 5 TABLET, FILM COATED ORAL at 12:26

## 2017-06-20 RX ADMIN — SEVELAMER CARBONATE 800 MILLIGRAM(S): 2400 POWDER, FOR SUSPENSION ORAL at 17:56

## 2017-06-20 RX ADMIN — SEVELAMER CARBONATE 800 MILLIGRAM(S): 2400 POWDER, FOR SUSPENSION ORAL at 12:27

## 2017-06-20 RX ADMIN — SEVELAMER CARBONATE 800 MILLIGRAM(S): 2400 POWDER, FOR SUSPENSION ORAL at 09:05

## 2017-06-20 RX ADMIN — Medication 75 MICROGRAM(S): at 07:01

## 2017-06-20 RX ADMIN — Medication 20.85 MICROGRAM(S)/KG/MIN: at 07:01

## 2017-06-20 RX ADMIN — BUDESONIDE AND FORMOTEROL FUMARATE DIHYDRATE 2 PUFF(S): 160; 4.5 AEROSOL RESPIRATORY (INHALATION) at 20:54

## 2017-06-20 RX ADMIN — AMIODARONE HYDROCHLORIDE 200 MILLIGRAM(S): 400 TABLET ORAL at 07:01

## 2017-06-20 RX ADMIN — Medication 20.85 MICROGRAM(S)/KG/MIN: at 20:53

## 2017-06-20 RX ADMIN — ATORVASTATIN CALCIUM 20 MILLIGRAM(S): 80 TABLET, FILM COATED ORAL at 20:55

## 2017-06-20 RX ADMIN — MIDODRINE HYDROCHLORIDE 30 MILLIGRAM(S): 2.5 TABLET ORAL at 12:26

## 2017-06-20 RX ADMIN — MIDODRINE HYDROCHLORIDE 30 MILLIGRAM(S): 2.5 TABLET ORAL at 07:02

## 2017-06-20 NOTE — PROGRESS NOTE ADULT - ASSESSMENT
Patient is a 62y Male with congestive heart failure , End Stage Renal Disease on Dialysis ,pulm edema, pulm fibrosis, resp failure on BiPAP, s/p seizure, w/persistent hypotension, psoas hematoma.

## 2017-06-20 NOTE — PROVIDER CONTACT NOTE (OTHER) - ACTION/TREATMENT ORDERED:
continue to monitor
midodrine given
midodrine given
pt placed on non-rebreather mask, duoneb treatment ordered, chest xray, and ABG labs ordered
Same was done and patient will monitored closely for the remaining time.
pt educated on dangers of surpressed resp drive and risks of stronger medication, pt continues to refuse medciation and continues to yell. rn offered to call patients family, but pt refused.
Stop dialysis  Md will evaluate pt for possible treatment later
Treatment terminated, bp improved to 109/58 then 96/51 prior to discharge, stable and asymptomatic, LAYO Cross evaluated patient at  dialysis  prior to discharge.
cont to monitor
cont to monitor
cont with medications as per order
continue to monitor
give midodrine as ordered
henrry harris at bedside speaking to family
will give midodrine as ordered

## 2017-06-20 NOTE — PROGRESS NOTE ADULT - SUBJECTIVE AND OBJECTIVE BOX
CHIEF COMPLAINT:Patient is a 62y old  Male who presents with a chief complaint of sob (09 May 2017 15:32)    	pt events noted at baseline without new complaints        PAST MEDICAL & SURGICAL HISTORY:  Chronic hypotension  AF (atrial fibrillation)  COPD (chronic obstructive pulmonary disease): 4L home O2  HLD (hyperlipidemia)  DM (diabetes mellitus)  ESRD (end stage renal disease) on dialysis  BPH (benign prostatic hypertrophy)  Myocardial infarction: 10/2011  Chronic renal insufficiency  Gout  Dyslipidemia  Diabetes mellitus  CHF (congestive heart failure)  AICD (automatic cardioverter/defibrillator) present: Biotronic - placed 9/11/09  H/O coronary angiogram          REVIEW OF SYSTEMS:  CONSTITUTIONAL: No fever, weight loss, or fatigue  EYES: No eye pain, visual disturbances, or discharge  NECK: No pain or stiffness  RESPIRATORY: sob the same  CARDIOVASCULAR: No chest pain, palpitations, passing out, dizziness, or leg swelling  GASTROINTESTINAL: No abdominal or epigastric pain. No nausea, vomiting, or hematemesis; No diarrhea or constipation. No melena or hematochezia.  GENITOURINARY: No dysuria, frequency, hematuria, or incontinence  NEUROLOGICAL: No headaches, memory loss, loss of strength, numbness, or tremors  SKIN: No itching, burning, rashes, or lesions   LYMPH Nodes: No enlarged glands  ENDOCRINE: No heat or cold intolerance; No hair loss  MUSCULOSKELETAL: No joint pain or swelling; No muscle, back, or extremity pain    Medications:  MEDICATIONS  (STANDING):  finasteride 5milliGRAM(s) Oral daily  amiodarone    Tablet 200milliGRAM(s) Oral daily  atorvastatin 20milliGRAM(s) Oral at bedtime  docusate sodium 100milliGRAM(s) Oral daily  midodrine 30milliGRAM(s) Oral every 8 hours  sevelamer hydrochloride 800milliGRAM(s) Oral three times a day with meals  levothyroxine 75MICROGram(s) Oral daily  insulin lispro (HumaLOG) corrective regimen sliding scale  SubCutaneous three times a day before meals  insulin lispro (HumaLOG) corrective regimen sliding scale  SubCutaneous at bedtime  dextrose 5%. 1000milliLiter(s) IV Continuous <Continuous>  dextrose 50% Injectable 12.5Gram(s) IV Push once  dextrose 50% Injectable 25Gram(s) IV Push once  dextrose 50% Injectable 25Gram(s) IV Push once  DOBUTamine Infusion 7MICROgram(s)/kG/Min IV Continuous <Continuous>  acetaminophen   Tablet. 650milliGRAM(s) Oral once  levETIRAcetam  IVPB 500milliGRAM(s) IV Intermittent daily  levETIRAcetam  IVPB 250milliGRAM(s) IV Intermittent daily  buDESOnide 160 MICROgram(s)/formoterol 4.5 MICROgram(s) Inhaler 2Puff(s) Inhalation two times a day  epoetin alba Injectable 63113Ctix(s) IV Push <User Schedule>  glycerin Suppository - Adult 1Suppository(s) Rectal once    MEDICATIONS  (PRN):  dextrose Gel 1Dose(s) Oral once PRN Blood Glucose LESS THAN 70 milliGRAM(s)/deciliter  glucagon  Injectable 1milliGRAM(s) IntraMuscular once PRN Glucose LESS THAN 70 milligrams/deciliter  acetaminophen   Tablet 650milliGRAM(s) Oral every 6 hours PRN For Temp greater than 38 C (100.4 F)  acetaminophen    Suspension. 650milliGRAM(s) Oral every 6 hours PRN Moderate Pain (4 - 6)  artificial tears (preservative free) Ophthalmic Solution 1Drop(s) Both EYES three times a day PRN Dry Eyes  sodium chloride 0.65% Nasal 1Spray(s) Both Nostrils four times a day PRN Nasal Congestion  oxyCODONE  5 mG/acetaminophen 325 mG 2Tablet(s) Oral every 6 hours PRN Severe Pain (7 - 10)    	    PHYSICAL EXAM:  T(C): 36.9, Max: 36.9 (06-20 @ 04:40)  HR: 65 (62 - 87)  BP: 123/73 (93/46 - 123/73)  RR: 14 (14 - 18)  SpO2: 100% (95% - 100%)  Wt(kg): --  I&O's Summary    I & Os for current day (as of 20 Jun 2017 09:34)  =============================================  IN: 1489.6 ml / OUT: 0 ml / NET: 1489.6 ml      Appearance: Normal	  HEENT:   Normal oral mucosa, PERRL, EOMI	  Lymphatic: No lymphadenopathy  Cardiovascular: Normal S1 S2, No JVD, No murmurs, No edema  Respiratory: Lungs clear to auscultation	  Psychiatry: A & O x 3, Mood & affect appropriate  Gastrointestinal:  Soft, Non-tender, + BS	  Skin: No rashes, No ecchymoses, No cyanosis	  Neurologic: Non-focal from  Extremities: Normal range of motion, No clubbing, cyanosis tr edema   Vascular: Peripheral pulses palpable 2+ bilaterally    TELEMETRY: 	    ECG:  	  RADIOLOGY:  OTHER: 	  	  LABS:	 	    CARDIAC MARKERS:  CARDIAC MARKERS ( 19 Jun 2017 20:50 )  x     / 0.56 ng/mL / 81 u/L / 4.73 ng/mL / x                                    8.2    7.02  )-----------( 209      ( 20 Jun 2017 07:45 )             27.3     06-20    132<L>  |  93<L>  |  37<H>  ----------------------------<  81  4.4   |  24  |  6.05<H>    Ca    9.3      20 Jun 2017 07:45  Phos  4.5     06-19  Mg     2.1     06-20    TPro  8.0  /  Alb  2.8<L>  /  TBili  1.6<H>  /  DBili  x   /  AST  40  /  ALT  16  /  AlkPhos  180<H>  06-19    proBNP:   Lipid Profile:   HgA1c:   TSH:

## 2017-06-20 NOTE — PROGRESS NOTE ADULT - SUBJECTIVE AND OBJECTIVE BOX
Pt seen and examined during dialysis.  No complaints currently. Comfortable.   Denies SOB, on nasal cannula.   RRT events overnight noted.     Allergies:  No Known Allergies    Hospital Medications:   MEDICATIONS  (STANDING):  finasteride 5milliGRAM(s) Oral daily  amiodarone    Tablet 200milliGRAM(s) Oral daily  atorvastatin 20milliGRAM(s) Oral at bedtime  docusate sodium 100milliGRAM(s) Oral daily  midodrine 30milliGRAM(s) Oral every 8 hours  sevelamer hydrochloride 800milliGRAM(s) Oral three times a day with meals  levothyroxine 75MICROGram(s) Oral daily  insulin lispro (HumaLOG) corrective regimen sliding scale  SubCutaneous three times a day before meals  insulin lispro (HumaLOG) corrective regimen sliding scale  SubCutaneous at bedtime  dextrose 5%. 1000milliLiter(s) IV Continuous <Continuous>  dextrose 50% Injectable 12.5Gram(s) IV Push once  dextrose 50% Injectable 25Gram(s) IV Push once  dextrose 50% Injectable 25Gram(s) IV Push once  DOBUTamine Infusion 7MICROgram(s)/kG/Min IV Continuous <Continuous>  acetaminophen   Tablet. 650milliGRAM(s) Oral once  levETIRAcetam  IVPB 500milliGRAM(s) IV Intermittent daily  levETIRAcetam  IVPB 250milliGRAM(s) IV Intermittent daily  buDESOnide 160 MICROgram(s)/formoterol 4.5 MICROgram(s) Inhaler 2Puff(s) Inhalation two times a day  epoetin alba Injectable 45505Vtai(s) IV Push <User Schedule>  glycerin Suppository - Adult 1Suppository(s) Rectal once      VITALS:  T(F): 97.7, Max: 98.4 (06-20 @ 04:40)  HR: 68  BP: 146/90  RR: 20  SpO2: 100%  Wt(kg): --    I & Os for current day (as of 06-20 @ 12:31)  =============================================  IN: 1489.6 ml / OUT: 0 ml / NET: 1489.6 ml      PHYSICAL EXAM:  Constitutional: NAD  HEENT: anicteric sclera, oropharynx clear, MMM  Neck: No JVD  Respiratory: Bibasilar crackles   Cardiovascular: S1, S2, RRR  Gastrointestinal: BS+, soft, NT/ND  Extremities: No cyanosis or clubbing. No peripheral edema  Neurological: A/O x 3, no focal deficits  Psychiatric: Normal mood, normal affect  : No CVA tenderness. No rosa.   Skin: No rashes  Vascular Access: RIJ tunneled cath     LABS:  06-20    132<L>  |  93<L>  |  37<H>  ----------------------------<  81  4.4   |  24  |  6.05<H>    Ca    9.3      20 Jun 2017 07:45  Phos  4.5     06-19  Mg     2.1     06-20    TPro  8.0  /  Alb  2.8<L>  /  TBili  1.6<H>  /  DBili      /  AST  40  /  ALT  16  /  AlkPhos  180<H>  06-19    Creatinine Trend: 6.05 <--, 5.51 <--, 5.03 <--, 3.76 <--, 4.95 <--, 3.80 <--, 5.26 <--, 4.01 <--                        8.2    7.02  )-----------( 209      ( 20 Jun 2017 07:45 )             27.3

## 2017-06-20 NOTE — PROVIDER CONTACT NOTE (OTHER) - NAME OF MD/NP/PA/DO NOTIFIED:
DR [facility]
Dr. Flores
LAYO Duran and Dr Ridley
Meche August
Meche August
Tele LAYO Brown
Tele LAYO Mcgregor
Tele LAYO Mcgregor
Tele LAYO Perkins
crista VASQUES, Grayson
crista VASQUES, Malathi
crista Velez
crista mendoza
henrry de la rosa
henrry harris
jacinta alfonso
tele LAYO Mcgregor
tele LAYO calloway
tele PA
tele PA
tele henrry pimentel
LAYO blue

## 2017-06-20 NOTE — PROVIDER CONTACT NOTE (OTHER) - RECOMMENDATIONS
Md recommended that dialysis should be resumed and reduce uf goal by 500ml for the next 2 hours and do sequential ultra filtration in the last hour for the treatment.
pt offered tramadol again but continues to refuse. pt stating "you'll see, I'm going to tell everyone you are not giving me any pain medication, i'll tell everybody. " gave patient hot packs
incr O2; LAYO pimentel came to evaluate
stop dialysis
cont to monitor
cont to monitor
cont with medications as per order
continue to monitor
give midodrine as ordered
henrry harris at bedside speaking to family
will give midodrine as ordered
I recommended hemodialysis treatment to be terminated at this time as patient is symptomatic form low bp. l suggest that you come to dialysis to evaluate patient prior to transfer to [address] [place] [address].

## 2017-06-20 NOTE — PROGRESS NOTE ADULT - ASSESSMENT
61 yo M with acute on chronic severe systolic CHF (EF 19%), ESRD, DM2, A fib, HCAP  s/p acute resp distress with accidental choking on Dentures   have been doing reasonably OK at this time. :: ILEOPSOAS HEMATOMA NOTED

## 2017-06-20 NOTE — PROVIDER CONTACT NOTE (OTHER) - ASSESSMENT
patient complaing of back pain. pt offered tramadol. per henrry de la rosa. but patient did not want that. RN consulted with henrry de la rosa and rapid response nurse woody. stronger medication can surpress resp drive, patient on continuous bipap and was s/p rapid response for desat.
pt SOB, but able to respond with use of excessive effort; at time pt O2 satting low in 70's; upon auscultation, crackles heard;
Patient denies any c/o of dizziness or headache
Patient denies dizziness, patient denies headache
`asymptomatic
asymptomatic
asymptomatic
fall safety precautions  no acute distress noted
fall safety precautions  no acute distress noted
neuro checks unchanged, patient mobility same as periovious
patient alert and oriented x4, bp 81/56 heart rate 101 respirations 18
patient is asymptomatic at this time
pt asymptx
Patient continue to be alert and oriented and asymptomatic despite hypotension. BP 96/36 p 74 prior to resuming treatment.

## 2017-06-20 NOTE — PROGRESS NOTE ADULT - PROBLEM SELECTOR PLAN 1
Pt tolerating HD today, with UF goal 2.5 kg as tolerated.   K acceptable. Will plan for isolated UF tomorrow to help optimize fluid status.   c/w renal diet, fluid restriction 1L/day   Very poor prognosis considering end stage comorbidities. No foreseeable clinical improvement.

## 2017-06-20 NOTE — PROVIDER CONTACT NOTE (OTHER) - REASON
Blood Pressure 75/49
Blood Pressure 86/55
DBP <60
Hypotension During Dialysis, 83/35mmhg
Low blood pressure
Patient complained of severe back associated with ear ache
RN went to give patient the medication, as patient accepted. patient took medication and threw it at nurse yelling "this is not what i want"
SBP > 90
bp 84/49
bp 86/48
bp 88/45
critical value, INR 6.72 ; pt 78.3
dbp <60
hypotensive
patient noted with decline in status; SOB; placed from venti mask back to bipap
patient noted with low blood pressure 84/60
patients family notified nured that patients left side is weaker than before
pt 102 ; INR 8.71
3 bts vrach
pt became extremely SOB

## 2017-06-20 NOTE — PROVIDER CONTACT NOTE (OTHER) - DATE AND TIME:
10-Niles-2017 03:35
29-May-2017 02:19
01-Jun-2017 01:14
01-Jun-2017 08:26
02-Jun-2017 06:09
02-Jun-2017 12:10
02-Jun-2017 14:09
02-Jun-2017 16:09
02-Jun-2017 23:15
03-Jun-2017 11:49
03-Jun-2017 21:20
04-Jun-2017 09:15
05-Jun-2017 07:55
07-Jun-2017 22:05
08-Jun-2017 01:18
08-Jun-2017 05:20
08-Jun-2017 14:50
20-Jun-2017 11:55
25-May-2017 10:45
29-May-2017 14:00
30-May-2017 10:51
30-May-2017 20:56
31-May-2017 05:15

## 2017-06-20 NOTE — PROVIDER CONTACT NOTE (OTHER) - SITUATION
BP 82/47
BP 87/51
BP 90/52
During dialysis pat bp dropped 83/35 mmhg , Patient's blood was returned. Patient remain asymptomatic, alert and oriented. bp was rechecked 116/38 p 76 bpm.
Patient BP is 86/55
Patient blood pressure is low, patient BP is consistently low
Patient complained of severe back pain and ear ache last 5 minutes of dialysis treatment
Patient is hypotensive
Pt started on dialysis for PUF Starting Bp 88/39 after 36 minutes pts blood pressure dropped to 76/33 recheck 61/31  pt rinsed back  after rinsebacl  bp 81/34 after 10 minutes 95/48
as per orders notify provider for sbp <60
as per orders, notify provider for DBP <60
patient noted with decline in status placed from venti mask back to bipap
patient noted with low blood pressure 84/60
patients family notified nurse that patients left side is weaker than before
pt had 3 bts vtach,
pt became extremely SOB

## 2017-06-20 NOTE — PROGRESS NOTE ADULT - SUBJECTIVE AND OBJECTIVE BOX
Patient is a 62y old  Male who presents with a chief complaint of sob (09 May 2017 15:32)  doing same  on nasal cannula 5l/mt  have been using bipap at night to sleep  says he gets benefit out of it: per sister      Any change in ROS:     MEDICATIONS  (STANDING):  finasteride 5milliGRAM(s) Oral daily  amiodarone    Tablet 200milliGRAM(s) Oral daily  atorvastatin 20milliGRAM(s) Oral at bedtime  docusate sodium 100milliGRAM(s) Oral daily  midodrine 30milliGRAM(s) Oral every 8 hours  sevelamer hydrochloride 800milliGRAM(s) Oral three times a day with meals  levothyroxine 75MICROGram(s) Oral daily  insulin lispro (HumaLOG) corrective regimen sliding scale  SubCutaneous three times a day before meals  insulin lispro (HumaLOG) corrective regimen sliding scale  SubCutaneous at bedtime  dextrose 5%. 1000milliLiter(s) IV Continuous <Continuous>  dextrose 50% Injectable 12.5Gram(s) IV Push once  dextrose 50% Injectable 25Gram(s) IV Push once  dextrose 50% Injectable 25Gram(s) IV Push once  DOBUTamine Infusion 7MICROgram(s)/kG/Min IV Continuous <Continuous>  acetaminophen   Tablet. 650milliGRAM(s) Oral once  levETIRAcetam  IVPB 500milliGRAM(s) IV Intermittent daily  levETIRAcetam  IVPB 250milliGRAM(s) IV Intermittent daily  buDESOnide 160 MICROgram(s)/formoterol 4.5 MICROgram(s) Inhaler 2Puff(s) Inhalation two times a day  epoetin alba Injectable [id number](s) IV Push <User Schedule>  glycerin Suppository - Adult 1Suppository(s) Rectal once    MEDICATIONS  (PRN):  dextrose Gel 1Dose(s) Oral once PRN Blood Glucose LESS THAN 70 milliGRAM(s)/deciliter  glucagon  Injectable 1milliGRAM(s) IntraMuscular once PRN Glucose LESS THAN 70 milligrams/deciliter  acetaminophen   Tablet 650milliGRAM(s) Oral every 6 hours PRN For Temp greater than 38 C (100.4 F)  acetaminophen    Suspension. 650milliGRAM(s) Oral every 6 hours PRN Moderate Pain (4 - 6)  artificial tears (preservative free) Ophthalmic Solution 1Drop(s) Both EYES three times a day PRN Dry Eyes  sodium chloride 0.65% Nasal 1Spray(s) Both Nostrils four times a day PRN Nasal Congestion  oxyCODONE  5 mG/acetaminophen 325 mG 2Tablet(s) Oral every 6 hours PRN Severe Pain (7 - 10)    Vital Signs Last 24 Hrs  T(C): 36.4, Max: 36.9 (06-20 @ 04:40)  T(F): 97.6, Max: 98.4 (06-20 @ 04:40)  HR: 68 (62 - 87)  BP: 109/56 (89/56 - 146/90)  BP(mean): --  RR: 20 (14 - 20)  SpO2: 97% (95% - 100%)    I&O's Summary  I & Os for 24h ending 20 Jun 2017 07:00  =============================================  IN: 1489.6 ml / OUT: 0 ml / NET: 1489.6 ml    I & Os for current day (as of 20 Jun 2017 18:02)  =============================================  IN: 0 ml / OUT: 2600 ml / NET: -2600 ml        Physical Exam:   GENERAL: NAD, well-groomed, well-developed  HEENT: BELL/   Atraumatic, Normocephalic  ENMT: No tonsillar erythema, exudates, or enlargement; Moist mucous membranes, Good dentition, No lesions  NECK: Supple, No JVD, Normal thyroid  CHEST/LUNG: bilateral crackles   CVS: Regular rate and rhythm; No murmurs, rubs, or gallops  GI: : Soft, Nontender, Nondistended; Bowel sounds present  NERVOUS SYSTEM:  Alert & Oriented X3,   EXTREMITIES:  2+ Peripheral Pulses, No clubbing, cyanosis, or edema  LYMPH: No lymphadenopathy noted  SKIN: No rashes or lesions  ENDOCRINOLOGY: No Thyromegaly  PSYCH: Appropriate  Labs:  ABG - ( 20 Jun 2017 00:46 )  pH: 7.41  /  pCO2: 39    /  pO2: 174   / HCO3: 24    / Base Excess: -0.3  /  SaO2: 100.0             CARDIAC MARKERS ( 19 Jun 2017 20:50 )  x     / 0.56 ng/mL / 81 u/L / 4.73 ng/mL / x                                8.2    7.02  )-----------( 209      ( 20 Jun 2017 07:45 )             27.3                         8.8    10.78 )-----------( 183      ( 19 Jun 2017 20:50 )             30.7                         8.4    8.06  )-----------( 205      ( 19 Jun 2017 06:11 )             28.5                         8.3    8.47  )-----------( 231      ( 18 Jun 2017 07:30 )             28.0                         8.1    8.44  )-----------( 221      ( 17 Jun 2017 07:30 )             27.3     06-20    132<L>  |  93<L>  |  37<H>  ----------------------------<  81  4.4   |  24  |  6.05<H>  06-19    134<L>  |  94<L>  |  33<H>  ----------------------------<  135<H>  4.8   |  25  |  5.51<H>  06-19    131<L>  |  93<L>  |  31<H>  ----------------------------<  118<H>  4.1   |  21<L>  |  5.03<H>  06-18    134<L>  |  94<L>  |  20  ----------------------------<  104<H>  3.8   |  25  |  3.76<H>  06-17    130<L>  |  91<L>  |  29<H>  ----------------------------<  150<H>  3.7   |  24  |  4.95<H>    Ca    9.3      20 Jun 2017 07:45  Ca    9.5      19 Jun 2017 20:50  Ca    9.6      19 Jun 2017 06:11  Phos  4.5     06-19  Mg     2.1     06-20  Mg     2.3     06-19    TPro  8.0  /  Alb  2.8<L>  /  TBili  1.6<H>  /  DBili  x   /  AST  40  /  ALT  16  /  AlkPhos  180<H>  06-19    CAPILLARY BLOOD GLUCOSE  135 (20 Jun 2017 16:57)  114 (20 Jun 2017 12:21)  91 (20 Jun 2017 08:34)  135 (19 Jun 2017 21:00)      LIVER FUNCTIONS - ( 19 Jun 2017 20:50 )  Alb: 2.8 g/dL / Pro: 8.0 g/dL / ALK PHOS: 180 u/L / ALT: 16 u/L / AST: 40 u/L / GGT: x           PT/INR - ( 20 Jun 2017 07:45 )   PT: 16.7 SEC;   INR: 1.48          Studies  Chest X-RAY  CT SCAN Chest unchanged small pleural effusions.    IMPRESSION:      Diffuse reticular opacities likely reflecting extensive pulmonary   fibrosis reported on recent CT scan are again seen.    Suggestion of small bilateral pleural effusions again noted.  Venous Dopplers: LE:   CT Abdomen  Others

## 2017-06-20 NOTE — PROVIDER CONTACT NOTE (OTHER) - BACKGROUND
ESRD, HD
pt admitted for syncope
Admit Diagnosis) Syncope and collapse  (PMH) Chronic hypotension  (PMH) AF (atrial fibrillation)  (PMH) COPD (chronic obstructive pulmonary disease)  (PMH) HLD (hyperlipidemia)
Admit Diagnosis) Syncope and collapse  (PMH) Chronic hypotension  (PMH) AF (atrial fibrillation)  (PMH) COPD (chronic obstructive pulmonary disease)  (PMH) HLD (hyperlipidemia)
CHF, chronic hypotension
CHF/ESRD
CHF/ESRD
Patient blood pressure is consistently low
Patient has been running low blood pressure
Patient is admitted with syncope and collapse
Pt known ESRD  Received midodrine this am
dialyses, weakness, bipap
patient blood pressure always runs low
pt admitted for syncope and collapse; PMH of chronic hypotension, afib, COPD, ESRD; had previous RRTs for hypotension, hypoxia and unresponsiveness
Patient was being dialyzes for 4 hrs 15 minutes before bp was 97/37 p 76  saline 100 cc was given.

## 2017-06-20 NOTE — PROGRESS NOTE ADULT - PROBLEM SELECTOR PLAN 3
no real treatment, cont bipap overnight: as patient seems to get help from it per family. He had bx, and it was consistent with rheumatoid arthritis: failed steroids and developed side effects of cellcept> Per Dr Malcolm, he has had multiple consultations and different opinions about his pulmonary disease: H had harris managed conservatively.

## 2017-06-20 NOTE — PROGRESS NOTE ADULT - ASSESSMENT
#End stage dilated cardiomyopathy s/p multiple  admissions at [facility] and [facility] recently, seen at both institutions by CHF teams, no further advance interventions suggested.  #AF, currently off coumadin due to ileo psoas hematomas. Would ask surgery to re evaluate to see if can be restarted.  #COPD   #Anemia  Prognosis guarded.

## 2017-06-21 LAB
BASOPHILS # BLD AUTO: 0.02 K/UL — SIGNIFICANT CHANGE UP (ref 0–0.2)
BASOPHILS NFR BLD AUTO: 0.3 % — SIGNIFICANT CHANGE UP (ref 0–2)
BUN SERPL-MCNC: 19 MG/DL — SIGNIFICANT CHANGE UP (ref 7–23)
CALCIUM SERPL-MCNC: 7.3 MG/DL — LOW (ref 8.4–10.5)
CHLORIDE SERPL-SCNC: 101 MMOL/L — SIGNIFICANT CHANGE UP (ref 98–107)
CO2 SERPL-SCNC: 36 MMOL/L — HIGH (ref 22–31)
CREAT SERPL-MCNC: 3.15 MG/DL — HIGH (ref 0.5–1.3)
EOSINOPHIL # BLD AUTO: 0.28 K/UL — SIGNIFICANT CHANGE UP (ref 0–0.5)
EOSINOPHIL NFR BLD AUTO: 3.7 % — SIGNIFICANT CHANGE UP (ref 0–6)
GLUCOSE SERPL-MCNC: 71 MG/DL — SIGNIFICANT CHANGE UP (ref 70–99)
HCT VFR BLD CALC: 27.6 % — LOW (ref 39–50)
HGB BLD-MCNC: 8.2 G/DL — LOW (ref 13–17)
IMM GRANULOCYTES NFR BLD AUTO: 0.3 % — SIGNIFICANT CHANGE UP (ref 0–1.5)
INR BLD: 1.55 — HIGH (ref 0.88–1.17)
LYMPHOCYTES # BLD AUTO: 1.39 K/UL — SIGNIFICANT CHANGE UP (ref 1–3.3)
LYMPHOCYTES # BLD AUTO: 18.5 % — SIGNIFICANT CHANGE UP (ref 13–44)
MAGNESIUM SERPL-MCNC: 1.9 MG/DL — SIGNIFICANT CHANGE UP (ref 1.6–2.6)
MCHC RBC-ENTMCNC: 29.7 % — LOW (ref 32–36)
MCHC RBC-ENTMCNC: 30 PG — SIGNIFICANT CHANGE UP (ref 27–34)
MCV RBC AUTO: 101.1 FL — HIGH (ref 80–100)
MONOCYTES # BLD AUTO: 1.08 K/UL — HIGH (ref 0–0.9)
MONOCYTES NFR BLD AUTO: 14.4 % — HIGH (ref 2–14)
NEUTROPHILS # BLD AUTO: 4.71 K/UL — SIGNIFICANT CHANGE UP (ref 1.8–7.4)
NEUTROPHILS NFR BLD AUTO: 62.8 % — SIGNIFICANT CHANGE UP (ref 43–77)
PLATELET # BLD AUTO: 195 K/UL — SIGNIFICANT CHANGE UP (ref 150–400)
PMV BLD: 11.1 FL — SIGNIFICANT CHANGE UP (ref 7–13)
POTASSIUM SERPL-MCNC: 3.3 MMOL/L — LOW (ref 3.5–5.3)
POTASSIUM SERPL-SCNC: 3.3 MMOL/L — LOW (ref 3.5–5.3)
PROTHROM AB SERPL-ACNC: 17.5 SEC — HIGH (ref 9.8–13.1)
RBC # BLD: 2.73 M/UL — LOW (ref 4.2–5.8)
RBC # FLD: 23.6 % — HIGH (ref 10.3–14.5)
SODIUM SERPL-SCNC: 136 MMOL/L — SIGNIFICANT CHANGE UP (ref 135–145)
WBC # BLD: 7.5 K/UL — SIGNIFICANT CHANGE UP (ref 3.8–10.5)
WBC # FLD AUTO: 7.5 K/UL — SIGNIFICANT CHANGE UP (ref 3.8–10.5)

## 2017-06-21 PROCEDURE — 74176 CT ABD & PELVIS W/O CONTRAST: CPT | Mod: 26

## 2017-06-21 RX ORDER — DOBUTAMINE HCL 250MG/20ML
0 VIAL (ML) INTRAVENOUS
Qty: 0 | Refills: 0 | COMMUNITY
Start: 2017-06-21

## 2017-06-21 RX ORDER — DOBUTAMINE HCL 250MG/20ML
0 VIAL (ML) INTRAVENOUS
Qty: 1 | Refills: 0 | OUTPATIENT
Start: 2017-06-21

## 2017-06-21 RX ADMIN — SEVELAMER CARBONATE 800 MILLIGRAM(S): 2400 POWDER, FOR SUSPENSION ORAL at 09:37

## 2017-06-21 RX ADMIN — BUDESONIDE AND FORMOTEROL FUMARATE DIHYDRATE 2 PUFF(S): 160; 4.5 AEROSOL RESPIRATORY (INHALATION) at 09:37

## 2017-06-21 RX ADMIN — Medication 75 MICROGRAM(S): at 06:39

## 2017-06-21 RX ADMIN — MIDODRINE HYDROCHLORIDE 30 MILLIGRAM(S): 2.5 TABLET ORAL at 06:40

## 2017-06-21 RX ADMIN — Medication 20.85 MICROGRAM(S)/KG/MIN: at 06:38

## 2017-06-21 RX ADMIN — LEVETIRACETAM 400 MILLIGRAM(S): 250 TABLET, FILM COATED ORAL at 11:50

## 2017-06-21 RX ADMIN — SEVELAMER CARBONATE 800 MILLIGRAM(S): 2400 POWDER, FOR SUSPENSION ORAL at 11:50

## 2017-06-21 RX ADMIN — AMIODARONE HYDROCHLORIDE 200 MILLIGRAM(S): 400 TABLET ORAL at 06:39

## 2017-06-21 RX ADMIN — MIDODRINE HYDROCHLORIDE 30 MILLIGRAM(S): 2.5 TABLET ORAL at 16:29

## 2017-06-21 RX ADMIN — FINASTERIDE 5 MILLIGRAM(S): 5 TABLET, FILM COATED ORAL at 11:50

## 2017-06-21 RX ADMIN — LEVETIRACETAM 400 MILLIGRAM(S): 250 TABLET, FILM COATED ORAL at 16:29

## 2017-06-21 RX ADMIN — Medication 100 MILLIGRAM(S): at 11:51

## 2017-06-21 RX ADMIN — SEVELAMER CARBONATE 800 MILLIGRAM(S): 2400 POWDER, FOR SUSPENSION ORAL at 17:28

## 2017-06-21 RX ADMIN — Medication 20.85 MICROGRAM(S)/KG/MIN: at 16:51

## 2017-06-21 NOTE — PROGRESS NOTE ADULT - ASSESSMENT
63 yo Male  with acute on chronic severe systolic CHF (EF 19%), ESRD, DM2, A fib, HCAP  - HCAP -s/p abs  aocd  - Pulmonary fibrosis, COPD - c/w inhalers, no benefit of steroids  - Acute on chronic systolic CHF - continue inotropic support as per Cardio, HD for maximum fluid removal  - ESRD - continue HD as per Renal, continue Midodrine.  - A fib - continue Amio,   - DM2 - controlled, continue with ISS  - LLE decreased ROM -imaging w/ hematomas  sx eval noted  no sx intervention  hold a/c for now  analgesics  pt  discussed w/ pt /family at bedside  wish to resume coumadin   discussed risks of rebleed  will recheck ct a/p  if reabsorbing can resume with increased risk of bleed  d/c planning   cpap as per pulm   prognosis poor /family still wish all measures to be taken for pts medical care  do not wish hospice / pall care  discussed very poor prognosis again w/ pt/ family at bedside and no further medical tx available for pts end stage disease

## 2017-06-21 NOTE — PROGRESS NOTE ADULT - SUBJECTIVE AND OBJECTIVE BOX
CHIEF COMPLAINT:Patient is a 62y old  Male who presents with a chief complaint of sob (09 May 2017 15:32)    	no new changes        PAST MEDICAL & SURGICAL HISTORY:  Chronic hypotension  AF (atrial fibrillation)  COPD (chronic obstructive pulmonary disease): 4L home O2  HLD (hyperlipidemia)  DM (diabetes mellitus)  ESRD (end stage renal disease) on dialysis  BPH (benign prostatic hypertrophy)  Myocardial infarction: 10/2011  Chronic renal insufficiency  Gout  Dyslipidemia  Diabetes mellitus  CHF (congestive heart failure)  AICD (automatic cardioverter/defibrillator) present: Biot[employer] - placed 9/11/09  H/O coronary angiogram          REVIEW OF SYSTEMS:  CONSTITUTIONAL: No fever, weight loss, or fatigue  EYES: No eye pain, visual disturbances, or discharge  NECK: No pain or stiffness  RESPIRATORY: sob without change  CARDIOVASCULAR: No chest pain, palpitations, passing out, dizziness, or leg swelling  GASTROINTESTINAL: No abdominal or epigastric pain. No nausea, vomiting, or hematemesis; No diarrhea or constipation. No melena or hematochezia.  GENITOURINARY: No dysuria, frequency, hematuria, or incontinence  NEUROLOGICAL: No headaches, memory loss, loss of strength, numbness, or tremors  SKIN: No itching, burning, rashes, or lesions   LYMPH Nodes: No enlarged glands  ENDOCRINE: No heat or cold intolerance; No hair loss  MUSCULOSKELETAL: No joint pain or swelling; No muscle, back, or extremity pain    Medications:  MEDICATIONS  (STANDING):  finasteride 5milliGRAM(s) Oral daily  amiodarone    Tablet 200milliGRAM(s) Oral daily  atorvastatin 20milliGRAM(s) Oral at bedtime  docusate sodium 100milliGRAM(s) Oral daily  midodrine 30milliGRAM(s) Oral every 8 hours  sevelamer hydrochloride 800milliGRAM(s) Oral three times a day with meals  levothyroxine 75MICROGram(s) Oral daily  insulin lispro (HumaLOG) corrective regimen sliding scale  SubCutaneous three times a day before meals  insulin lispro (HumaLOG) corrective regimen sliding scale  SubCutaneous at bedtime  dextrose 5%. 1000milliLiter(s) IV Continuous <Continuous>  dextrose 50% Injectable 12.5Gram(s) IV Push once  dextrose 50% Injectable 25Gram(s) IV Push once  dextrose 50% Injectable 25Gram(s) IV Push once  DOBUTamine Infusion 7MICROgram(s)/kG/Min IV Continuous <Continuous>  acetaminophen   Tablet. 650milliGRAM(s) Oral once  levETIRAcetam  IVPB 500milliGRAM(s) IV Intermittent daily  levETIRAcetam  IVPB 250milliGRAM(s) IV Intermittent daily  buDESOnide 160 MICROgram(s)/formoterol 4.5 MICROgram(s) Inhaler 2Puff(s) Inhalation two times a day  epoetin alba Injectable [id number](s) IV Push <User Schedule>  glycerin Suppository - Adult 1Suppository(s) Rectal once    MEDICATIONS  (PRN):  dextrose Gel 1Dose(s) Oral once PRN Blood Glucose LESS THAN 70 milliGRAM(s)/deciliter  glucagon  Injectable 1milliGRAM(s) IntraMuscular once PRN Glucose LESS THAN 70 milligrams/deciliter  acetaminophen   Tablet 650milliGRAM(s) Oral every 6 hours PRN For Temp greater than 38 C (100.4 F)  acetaminophen    Suspension. 650milliGRAM(s) Oral every 6 hours PRN Moderate Pain (4 - 6)  artificial tears (preservative free) Ophthalmic Solution 1Drop(s) Both EYES three times a day PRN Dry Eyes  sodium chloride 0.65% Nasal 1Spray(s) Both Nostrils four times a day PRN Nasal Congestion  oxyCODONE  5 mG/acetaminophen 325 mG 2Tablet(s) Oral every 6 hours PRN Severe Pain (7 - 10)    	    PHYSICAL EXAM:  T(C): 36.7, Max: 36.9 (06-20 @ 20:49)  HR: 74 (68 - 82)  BP: 160/74 (89/56 - 160/74)  RR: 16 (15 - 20)  SpO2: 99% (97% - 100%)  Wt(kg): --  I&O's Summary  I & Os for 24h ending 21 Jun 2017 07:00  =============================================  IN: 329.6 ml / OUT: 2600 ml / NET: -2270.4 ml    I & Os for current day (as of 21 Jun 2017 10:03)  =============================================  IN: 62.4 ml / OUT: 0 ml / NET: 62.4 ml      Appearance: Normal	  HEENT:   Normal oral mucosa, PERRL, EOMI	  Lymphatic: No lymphadenopathy  Cardiovascular: Normal S1 S2, No JVD, No murmurs, No edema  Respiratory:dec bs  Psychiatry: A & O x 3, Mood & affect appropriate  Gastrointestinal:  Soft, Non-tender, + BS	  Skin: No rashes, No ecchymoses, No cyanosis	  Neurologic: Non-focal  Extremities: Normal range of motion, No clubbing, tr edema  Vascular: Peripheral pulses palpable 2+ bilaterally    TELEMETRY: 	    ECG:  	  RADIOLOGY:  OTHER: 	  	  LABS:	 	    CARDIAC MARKERS:  CARDIAC MARKERS ( 19 Jun 2017 20:50 )  x     / 0.56 ng/mL / 81 u/L / 4.73 ng/mL / x                                    8.2    7.50  )-----------( 195      ( 21 Jun 2017 07:00 )             27.6     06-21    136  |  101  |  19  ----------------------------<  71  3.3<L>   |  36<H>  |  3.15<H>    Ca    7.3<L>      21 Jun 2017 07:00  Phos  4.5     06-19  Mg     1.9     06-21    TPro  8.0  /  Alb  2.8<L>  /  TBili  1.6<H>  /  DBili  x   /  AST  40  /  ALT  16  /  AlkPhos  180<H>  06-19    proBNP:   Lipid Profile:   HgA1c:   TSH:

## 2017-06-21 NOTE — PROGRESS NOTE ADULT - ASSESSMENT
Patient is a 62y Male with congestive heart failure , End Stage Renal Disease on Dialysis ,pulm edema, pulm fibrosis, resp failure on BiPAP, s/p seizure, w/persistent hypotension, psoas hematoma.  ESRD on HD, fluid overload-   Hypotension-

## 2017-06-21 NOTE — PROGRESS NOTE ADULT - PROBLEM SELECTOR PLAN 1
stable for now: evaluated by surgery: hold coumadin  INR is low now: repeat ct scan with same hematoma

## 2017-06-21 NOTE — PROGRESS NOTE ADULT - PROBLEM SELECTOR PLAN 1
c/w isolated UF net UF 2L, bp rel stable, tolerating well  plan for HD tomorrow, with 3K bath, UF goal 2.5-3 kg as tolerated, 1st hr seqUF, then 3hrs HD.   been offering extra PUF to help optimize fluid status.   c/w renal diet, fluid restriction 1L/day   Very poor prognosis considering end stage comorbidities. No foreseeable clinical improvement.

## 2017-06-21 NOTE — PROGRESS NOTE ADULT - SUBJECTIVE AND OBJECTIVE BOX
Pt seen and examined during dialysis.    No complaints currently. Comfortable. sleeping. arousable easily.   Denies SOB, on nasal cannula.   No acute events overnight.     Allergies:  No Known Allergies    Hospital Medications:   MEDICATIONS  (STANDING):  finasteride 5milliGRAM(s) Oral daily  amiodarone    Tablet 200milliGRAM(s) Oral daily  atorvastatin 20milliGRAM(s) Oral at bedtime  docusate sodium 100milliGRAM(s) Oral daily  midodrine 30milliGRAM(s) Oral every 8 hours  sevelamer hydrochloride 800milliGRAM(s) Oral three times a day with meals  levothyroxine 75MICROGram(s) Oral daily  insulin lispro (HumaLOG) corrective regimen sliding scale  SubCutaneous three times a day before meals  insulin lispro (HumaLOG) corrective regimen sliding scale  SubCutaneous at bedtime  dextrose 5%. 1000milliLiter(s) IV Continuous <Continuous>  dextrose 50% Injectable 12.5Gram(s) IV Push once  dextrose 50% Injectable 25Gram(s) IV Push once  dextrose 50% Injectable 25Gram(s) IV Push once  DOBUTamine Infusion 7MICROgram(s)/kG/Min IV Continuous <Continuous>  acetaminophen   Tablet. 650milliGRAM(s) Oral once  levETIRAcetam  IVPB 500milliGRAM(s) IV Intermittent daily  levETIRAcetam  IVPB 250milliGRAM(s) IV Intermittent daily  buDESOnide 160 MICROgram(s)/formoterol 4.5 MICROgram(s) Inhaler 2Puff(s) Inhalation two times a day  epoetin alba Injectable 23106Shdq(s) IV Push <User Schedule>  glycerin Suppository - Adult 1Suppository(s) Rectal once      VITALS:  Vital Signs Last 24 Hrs  T(C): 36.5, Max: 36.9 (06-20 @ 20:49)  T(F): 97.7, Max: 98.5 (06-20 @ 20:49)  HR: 78 (68 - 82)  BP: 85/43 (85/43 - 160/74)  BP(mean): --  RR: 18 (15 - 20)  SpO2: 99% (97% - 100%)    I&O's Summary  I & Os for 24h ending 21 Jun 2017 07:00  =============================================  IN: 329.6 ml / OUT: 2600 ml / NET: -2270.4 ml    I & Os for current day (as of 21 Jun 2017 15:19)  =============================================  IN: 166.4 ml / OUT: 0 ml / NET: 166.4 ml    PHYSICAL EXAM:  Constitutional: NAD  HEENT: anicteric sclera, oropharynx clear, MMM  Neck: No JVD  Respiratory: Bibasilar crackles   Cardiovascular: S1, S2, RRR  Gastrointestinal: BS+, soft, NT/ND  Extremities: No cyanosis or clubbing. No peripheral edema  Neurological: A/O x 3, no focal deficits  Psychiatric: Normal mood, normal affect  : No CVA tenderness. No rosa.   Skin: No rashes  Vascular Access: RIJ tunneled cath     LABS:  06-21    136  |  101  |  19  ----------------------------<  71  3.3<L>   |  36<H>  |  3.15<H>    Ca    7.3<L>      21 Jun 2017 07:00  Phos  4.5     06-19  Mg     1.9     06-21    TPro  8.0  /  Alb  2.8<L>  /  TBili  1.6<H>  /  DBili  x   /  AST  40  /  ALT  16  /  AlkPhos  180<H>  06-19                        8.2    7.50  )-----------( 195      ( 21 Jun 2017 07:00 )             27.6

## 2017-06-21 NOTE — PROGRESS NOTE ADULT - SUBJECTIVE AND OBJECTIVE BOX
Patient is a 62y old  Male who presents with a chief complaint of sob (09 May 2017 15:32)    doing ok       Any change in ROS:     MEDICATIONS  (STANDING):  finasteride 5milliGRAM(s) Oral daily  amiodarone    Tablet 200milliGRAM(s) Oral daily  atorvastatin 20milliGRAM(s) Oral at bedtime  docusate sodium 100milliGRAM(s) Oral daily  midodrine 30milliGRAM(s) Oral every 8 hours  sevelamer hydrochloride 800milliGRAM(s) Oral three times a day with meals  levothyroxine 75MICROGram(s) Oral daily  insulin lispro (HumaLOG) corrective regimen sliding scale  SubCutaneous three times a day before meals  insulin lispro (HumaLOG) corrective regimen sliding scale  SubCutaneous at bedtime  dextrose 5%. 1000milliLiter(s) IV Continuous <Continuous>  dextrose 50% Injectable 12.5Gram(s) IV Push once  dextrose 50% Injectable 25Gram(s) IV Push once  dextrose 50% Injectable 25Gram(s) IV Push once  DOBUTamine Infusion 7MICROgram(s)/kG/Min IV Continuous <Continuous>  acetaminophen   Tablet. 650milliGRAM(s) Oral once  levETIRAcetam  IVPB 500milliGRAM(s) IV Intermittent daily  levETIRAcetam  IVPB 250milliGRAM(s) IV Intermittent daily  buDESOnide 160 MICROgram(s)/formoterol 4.5 MICROgram(s) Inhaler 2Puff(s) Inhalation two times a day  epoetin alba Injectable 65712Msiq(s) IV Push <User Schedule>  glycerin Suppository - Adult 1Suppository(s) Rectal once    MEDICATIONS  (PRN):  dextrose Gel 1Dose(s) Oral once PRN Blood Glucose LESS THAN 70 milliGRAM(s)/deciliter  glucagon  Injectable 1milliGRAM(s) IntraMuscular once PRN Glucose LESS THAN 70 milligrams/deciliter  acetaminophen   Tablet 650milliGRAM(s) Oral every 6 hours PRN For Temp greater than 38 C (100.4 F)  acetaminophen    Suspension. 650milliGRAM(s) Oral every 6 hours PRN Moderate Pain (4 - 6)  artificial tears (preservative free) Ophthalmic Solution 1Drop(s) Both EYES three times a day PRN Dry Eyes  sodium chloride 0.65% Nasal 1Spray(s) Both Nostrils four times a day PRN Nasal Congestion  oxyCODONE  5 mG/acetaminophen 325 mG 2Tablet(s) Oral every 6 hours PRN Severe Pain (7 - 10)    Vital Signs Last 24 Hrs  T(C): 36.5, Max: 36.9 (06-20 @ 20:49)  T(F): 97.7, Max: 98.5 (06-20 @ 20:49)  HR: 78 (68 - 82)  BP: 85/43 (85/43 - 160/74)  BP(mean): --  RR: 18 (15 - 20)  SpO2: 99% (97% - 100%)    I&O's Summary  I & Os for 24h ending 21 Jun 2017 07:00  =============================================  IN: 329.6 ml / OUT: 2600 ml / NET: -2270.4 ml    I & Os for current day (as of 21 Jun 2017 15:27)  =============================================  IN: 566.4 ml / OUT: 2400 ml / NET: -1833.6 ml        Physical Exam:   GENERAL: NAD, well-groomed, well-developed  HEENT: BELL/   Atraumatic, Normocephalic  ENMT: No tonsillar erythema, exudates, or enlargement; Moist mucous membranes, Good dentition, No lesions  NECK: Supple, No JVD, Normal thyroid  CHEST/LUNG: crackles bilaterally   CVS: Regular rate and rhythm; No murmurs, rubs, or gallops  GI: : Soft, Nontender, Nondistended; Bowel sounds present  NERVOUS SYSTEM:  Alert & Oriented X3, Good concentration; Motor Strength 5/5 B/L upper and lower extremities; DTRs 2+ intact and symmetric  EXTREMITIES:  2+ Peripheral Pulses, No clubbing, cyanosis, or edema  LYMPH: No lymphadenopathy noted  SKIN: No rashes or lesions  ENDOCRINOLOGY: No Thyromegaly  PSYCH: Appropriate    Labs:  ABG - ( 20 Jun 2017 00:46 )  pH: 7.41  /  pCO2: 39    /  pO2: 174   / HCO3: 24    / Base Excess: -0.3  /  SaO2: 100.0             CARDIAC MARKERS ( 19 Jun 2017 20:50 )  x     / 0.56 ng/mL / 81 u/L / 4.73 ng/mL / x                                8.2    7.50  )-----------( 195      ( 21 Jun 2017 07:00 )             27.6                         8.2    7.02  )-----------( 209      ( 20 Jun 2017 07:45 )             27.3                         8.8    10.78 )-----------( 183      ( 19 Jun 2017 20:50 )             30.7                         8.4    8.06  )-----------( 205      ( 19 Jun 2017 06:11 )             28.5                         8.3    8.47  )-----------( 231      ( 18 Jun 2017 07:30 )             28.0     06-21    136  |  101  |  19  ----------------------------<  71  3.3<L>   |  36<H>  |  3.15<H>  06-20    132<L>  |  93<L>  |  37<H>  ----------------------------<  81  4.4   |  24  |  6.05<H>  06-19    134<L>  |  94<L>  |  33<H>  ----------------------------<  135<H>  4.8   |  25  |  5.51<H>  06-19    131<L>  |  93<L>  |  31<H>  ----------------------------<  118<H>  4.1   |  21<L>  |  5.03<H>  06-18    134<L>  |  94<L>  |  20  ----------------------------<  104<H>  3.8   |  25  |  3.76<H>    Ca    7.3<L>      21 Jun 2017 07:00  Ca    9.3      20 Jun 2017 07:45  Ca    9.5      19 Jun 2017 20:50  Phos  4.5     06-19  Mg     1.9     06-21  Mg     2.1     06-20  Mg     2.3     06-19    TPro  8.0  /  Alb  2.8<L>  /  TBili  1.6<H>  /  DBili  x   /  AST  40  /  ALT  16  /  AlkPhos  180<H>  06-19    CAPILLARY BLOOD GLUCOSE  134 (21 Jun 2017 11:53)  99 (21 Jun 2017 08:25)  106 (20 Jun 2017 22:16)  135 (20 Jun 2017 16:57)      LIVER FUNCTIONS - ( 19 Jun 2017 20:50 )  Alb: 2.8 g/dL / Pro: 8.0 g/dL / ALK PHOS: 180 u/L / ALT: 16 u/L / AST: 40 u/L / GGT: x           PT/INR - ( 21 Jun 2017 07:00 )   PT: 17.5 SEC;   INR: 1.55              Cultures:                             Studies  Chest X-RAYURETERS: Small bilateral hypodense renal lesions, too small to   characterize. Atrophic kidneys.    BLADDER: Within normal limits.  REPRODUCTIVE ORGANS: The prostate iswithin normal limits.    BOWEL: No bowel obstruction. Appendix normal.  PERITONEUM: No ascites.  VESSELS:  Atherosclerotic calcifications.  RETROPERITONEUM: No lymphadenopathy.  The iliopsoas muscles are   heterogeneously enlarged bilaterally, compatible with intramuscular   hematomas. These are not significantly changed in size when compared to   6/16/2017.  ABDOMINAL WALL: Anasarca.  BONES: Within normal limits.    IMPRESSION:   Bilateral iliopsoas hematomas are essentially unchanged.  CT SCAN Chest   Venous Dopplers: LE:   CT Abdomen  Others

## 2017-06-22 LAB
BUN SERPL-MCNC: 31 MG/DL — HIGH (ref 7–23)
CALCIUM SERPL-MCNC: 8.6 MG/DL — SIGNIFICANT CHANGE UP (ref 8.4–10.5)
CHLORIDE SERPL-SCNC: 89 MMOL/L — LOW (ref 98–107)
CO2 SERPL-SCNC: 26 MMOL/L — SIGNIFICANT CHANGE UP (ref 22–31)
CREAT SERPL-MCNC: 4.91 MG/DL — HIGH (ref 0.5–1.3)
GLUCOSE SERPL-MCNC: 111 MG/DL — HIGH (ref 70–99)
HCT VFR BLD CALC: 27.2 % — LOW (ref 39–50)
HGB BLD-MCNC: 8.2 G/DL — LOW (ref 13–17)
INR BLD: 1.52 — HIGH (ref 0.88–1.17)
MAGNESIUM SERPL-MCNC: 1.9 MG/DL — SIGNIFICANT CHANGE UP (ref 1.6–2.6)
MCHC RBC-ENTMCNC: 30.1 % — LOW (ref 32–36)
MCHC RBC-ENTMCNC: 30.4 PG — SIGNIFICANT CHANGE UP (ref 27–34)
MCV RBC AUTO: 100.7 FL — HIGH (ref 80–100)
PLATELET # BLD AUTO: 172 K/UL — SIGNIFICANT CHANGE UP (ref 150–400)
PMV BLD: 11.7 FL — SIGNIFICANT CHANGE UP (ref 7–13)
POTASSIUM SERPL-MCNC: 3.8 MMOL/L — SIGNIFICANT CHANGE UP (ref 3.5–5.3)
POTASSIUM SERPL-SCNC: 3.8 MMOL/L — SIGNIFICANT CHANGE UP (ref 3.5–5.3)
PROTHROM AB SERPL-ACNC: 17.2 SEC — HIGH (ref 9.8–13.1)
RBC # BLD: 2.7 M/UL — LOW (ref 4.2–5.8)
RBC # FLD: 22.9 % — HIGH (ref 10.3–14.5)
SODIUM SERPL-SCNC: 129 MMOL/L — LOW (ref 135–145)
WBC # BLD: 6.55 K/UL — SIGNIFICANT CHANGE UP (ref 3.8–10.5)
WBC # FLD AUTO: 6.55 K/UL — SIGNIFICANT CHANGE UP (ref 3.8–10.5)

## 2017-06-22 RX ORDER — INSULIN GLARGINE 100 [IU]/ML
4 INJECTION, SOLUTION SUBCUTANEOUS
Qty: 0 | Refills: 0 | COMMUNITY

## 2017-06-22 RX ORDER — LEVETIRACETAM 250 MG/1
1 TABLET, FILM COATED ORAL
Qty: 30 | Refills: 0 | OUTPATIENT
Start: 2017-06-22 | End: 2017-07-22

## 2017-06-22 RX ORDER — BUDESONIDE AND FORMOTEROL FUMARATE DIHYDRATE 160; 4.5 UG/1; UG/1
2 AEROSOL RESPIRATORY (INHALATION)
Qty: 1 | Refills: 0 | OUTPATIENT
Start: 2017-06-22 | End: 2017-07-22

## 2017-06-22 RX ORDER — ERYTHROPOIETIN 10000 [IU]/ML
20000 INJECTION, SOLUTION INTRAVENOUS; SUBCUTANEOUS
Qty: 0 | Refills: 0 | COMMUNITY
Start: 2017-06-22

## 2017-06-22 RX ORDER — INSULIN NPH HUM/REG INSULIN HM 70-30/ML
25 VIAL (ML) SUBCUTANEOUS
Qty: 0 | Refills: 0 | COMMUNITY

## 2017-06-22 RX ORDER — MIDODRINE HYDROCHLORIDE 2.5 MG/1
3 TABLET ORAL
Qty: 270 | Refills: 0 | OUTPATIENT
Start: 2017-06-22 | End: 2017-07-22

## 2017-06-22 RX ADMIN — BUDESONIDE AND FORMOTEROL FUMARATE DIHYDRATE 2 PUFF(S): 160; 4.5 AEROSOL RESPIRATORY (INHALATION) at 23:33

## 2017-06-22 RX ADMIN — Medication 20.85 MICROGRAM(S)/KG/MIN: at 15:13

## 2017-06-22 RX ADMIN — Medication 1: at 18:06

## 2017-06-22 RX ADMIN — ATORVASTATIN CALCIUM 20 MILLIGRAM(S): 80 TABLET, FILM COATED ORAL at 00:03

## 2017-06-22 RX ADMIN — LEVETIRACETAM 400 MILLIGRAM(S): 250 TABLET, FILM COATED ORAL at 16:23

## 2017-06-22 RX ADMIN — FINASTERIDE 5 MILLIGRAM(S): 5 TABLET, FILM COATED ORAL at 12:04

## 2017-06-22 RX ADMIN — Medication 100 MILLIGRAM(S): at 12:09

## 2017-06-22 RX ADMIN — LEVETIRACETAM 400 MILLIGRAM(S): 250 TABLET, FILM COATED ORAL at 14:08

## 2017-06-22 RX ADMIN — Medication 75 MICROGRAM(S): at 06:56

## 2017-06-22 RX ADMIN — MIDODRINE HYDROCHLORIDE 30 MILLIGRAM(S): 2.5 TABLET ORAL at 23:19

## 2017-06-22 RX ADMIN — MIDODRINE HYDROCHLORIDE 30 MILLIGRAM(S): 2.5 TABLET ORAL at 00:04

## 2017-06-22 RX ADMIN — SEVELAMER CARBONATE 800 MILLIGRAM(S): 2400 POWDER, FOR SUSPENSION ORAL at 17:32

## 2017-06-22 RX ADMIN — BUDESONIDE AND FORMOTEROL FUMARATE DIHYDRATE 2 PUFF(S): 160; 4.5 AEROSOL RESPIRATORY (INHALATION) at 09:43

## 2017-06-22 RX ADMIN — ATORVASTATIN CALCIUM 20 MILLIGRAM(S): 80 TABLET, FILM COATED ORAL at 23:19

## 2017-06-22 RX ADMIN — MIDODRINE HYDROCHLORIDE 30 MILLIGRAM(S): 2.5 TABLET ORAL at 14:08

## 2017-06-22 RX ADMIN — AMIODARONE HYDROCHLORIDE 200 MILLIGRAM(S): 400 TABLET ORAL at 06:56

## 2017-06-22 RX ADMIN — SEVELAMER CARBONATE 800 MILLIGRAM(S): 2400 POWDER, FOR SUSPENSION ORAL at 12:04

## 2017-06-22 RX ADMIN — MIDODRINE HYDROCHLORIDE 30 MILLIGRAM(S): 2.5 TABLET ORAL at 06:55

## 2017-06-22 RX ADMIN — SEVELAMER CARBONATE 800 MILLIGRAM(S): 2400 POWDER, FOR SUSPENSION ORAL at 09:22

## 2017-06-22 RX ADMIN — ERYTHROPOIETIN 20000 UNIT(S): 10000 INJECTION, SOLUTION INTRAVENOUS; SUBCUTANEOUS at 12:07

## 2017-06-22 NOTE — DISCHARGE NOTE ADULT - SECONDARY DIAGNOSIS.
AF (atrial fibrillation) Cardiomyopathy Chronic hypotension ESRD (end stage renal disease) on dialysis Pulmonary fibrosis

## 2017-06-22 NOTE — DISCHARGE NOTE ADULT - PLAN OF CARE
continue medications follow up with your PMD in one week - call for appointment your coumadin was discontinued secondary to developing hematomas, continue amiodarone for rate control continue cardiac medications as prescribed, low salt diet, daily weights, fluid restriction,  follow up with your Cardiologist in one week - call for appointment continue midodrine continue your normal hemodialysis session in the community on Mon, Wed, Fri, renal diet follow up with Pulmonologist Dr. Coffman or your outpatient Pulmonologist Dr. Malcolm in 1-2 weeks, continue BIPAP at bedtime remain euvolemic

## 2017-06-22 NOTE — PROGRESS NOTE ADULT - ASSESSMENT
Patient is a 62y Male with congestive heart failure , End Stage Renal Disease on Dialysis ,pulm edema, pulm fibrosis, resp failure on BiPAP, s/p seizure, w/persistent hypotension, psoas hematoma.  ESRD on HD, fluid overload abd chronic hypotension.

## 2017-06-22 NOTE — PROGRESS NOTE ADULT - PROBLEM SELECTOR PLAN 1
Tolerating HD currently. Had PUF x1 hour, followed by 3 hour HD.   UF goal 3 kg, as BP tolerates.   Very poor prognosis considering end stage comorbidities.

## 2017-06-22 NOTE — DISCHARGE NOTE ADULT - MEDICATION SUMMARY - MEDICATIONS TO TAKE
I will START or STAY ON the medications listed below when I get home from the hospital:    Rolling Walker  -- Diagnosis:  CRISTINE: 99  Height: 180.34cm  Weight: 89.4kg  -- Indication: For Walker    dobutamine infusion   -- 500mg in dextrose 5% 250ml, infuse at 20.853ml/hr Dose rate: 7mcg/kg/min  -- Indication: For CHF (congestive heart failure)    finasteride 5 mg oral tablet  -- 1 tab(s) by mouth once a day  -- Indication: For BPH (benign prostatic hypertrophy)    amiodarone 200 mg oral tablet  -- 1 tab(s) by mouth once a day  -- Indication: For Cardiomyopathy    Keppra 750 mg oral tablet  -- 1 tab(s) by mouth once a day  -- Check with your doctor before becoming pregnant.  It is very important that you take or use this exactly as directed.  Do not skip doses or discontinue unless directed by your doctor.  May cause drowsiness or dizziness.  Obtain medical advice before taking any non-prescription drugs as some may affect the action of this medication.  Swallow whole.  Do not crush.  This drug may impair the ability to drive or operate machinery.  Use care until you become familiar with its effects.    -- Indication: For Seizure    NovoLIN 70/30 subcutaneous suspension  -- 20 unit(s) subcutaneous once a day - check finger sticks 4 times a day and adjust accordingly  -- Indication: For DM (diabetes mellitus)    Lipitor 20 mg oral tablet  -- 1 tab(s) by mouth once a day  -- Indication: For Hyperlipidemia    budesonide-formoterol 160 mcg-4.5 mcg/inh inhalation aerosol  -- 2 puff(s) inhaled 2 times a day  -- Indication: For COPD (chronic obstructive pulmonary disease)    epoetin alba  -- 85072 unit(s) injectable 3 times a week during hemodialysis  -- Indication: For ESRD (end stage renal disease) on dialysis    docusate sodium 100 mg oral capsule  -- 1 cap(s) by mouth once a day  -- Indication: For Constipation    midodrine 10 mg oral tablet  -- 3 tab(s) by mouth every 8 hours  -- Indication: For Hypotension    ocular lubricant ophthalmic solution  -- 1 drop(s) to each affected eye 3 times a day, As needed, Dry Eyes  -- Indication: For Eye drops    Renvela 800 mg oral tablet  -- 1 tab(s) by mouth 3 times a day (with meals)  -- Indication: For ESRD (end stage renal disease) on dialysis    Synthroid 75 mcg (0.075 mg) oral tablet  -- 1 tab(s) by mouth once a day  -- Indication: For Hypothyroid I will START or STAY ON the medications listed below when I get home from the hospital:    Rolling Walker  -- Diagnosis:  CRISTINE: 99  Height: 180.34cm  Weight: 89.4kg  -- Indication: For Walker    dobutamine infusion   -- 500mg in dextrose 5% 250ml, infuse at 20.853ml/hr Dose rate: 7mcg/kg/min  -- Indication: For CHF (congestive heart failure)    finasteride 5 mg oral tablet  -- 1 tab(s) by mouth once a day  -- Indication: For BPH (benign prostatic hypertrophy)    amiodarone 200 mg oral tablet  -- 1 tab(s) by mouth once a day  -- Indication: For Cardiomyopathy    Keppra 750 mg oral tablet  -- 1 tab(s) by mouth once a day  -- Check with your doctor before becoming pregnant.  It is very important that you take or use this exactly as directed.  Do not skip doses or discontinue unless directed by your doctor.  May cause drowsiness or dizziness.  Obtain medical advice before taking any non-prescription drugs as some may affect the action of this medication.  Swallow whole.  Do not crush.  This drug may impair the ability to drive or operate machinery.  Use care until you become familiar with its effects.    -- Indication: For Seizure    NovoLIN 70/30 subcutaneous suspension  -- 20 unit(s) subcutaneous once a day - check finger sticks 4 times a day and adjust accordingly  -- Indication: For DM (diabetes mellitus)    Lipitor 20 mg oral tablet  -- 1 tab(s) by mouth once a day  -- Indication: For Hyperlipidemia    budesonide-formoterol 160 mcg-4.5 mcg/inh inhalation aerosol  -- 2 puff(s) inhaled 2 times a day  -- Indication: For COPD (chronic obstructive pulmonary disease)    epoetin alba  -- 62276 unit(s) injectable 3 times a week during hemodialysis  -- Indication: For ESRD (end stage renal disease) on dialysis    docusate sodium 100 mg oral capsule  -- 1 cap(s) by mouth once a day  -- Indication: For Constipation    midodrine 10 mg oral tablet  -- 3 tab(s) by mouth every 8 hours  -- Indication: For Hypotension    ocular lubricant ophthalmic solution  -- 1 drop(s) to each affected eye 3 times a day, As needed, Dry Eyes  -- Indication: For Eye drops    Renvela 800 mg oral tablet  -- 1 tab(s) by mouth 3 times a day (with meals)  -- Indication: For ESRD (end stage renal disease) on dialysis    Synthroid 75 mcg (0.075 mg) oral tablet  -- 1 tab(s) by mouth once a day  -- Indication: For Hypothyroid

## 2017-06-22 NOTE — DISCHARGE NOTE ADULT - ADDITIONAL INSTRUCTIONS
follow up with your PMD in one week - call for appointment  follow up with Cardiology in one week - call for appointment  follow up with your Nephrologist within one week - resume hemodialysis as before  check finger sticks 4 times a day follow up with your Pulmonologist Dr. Malcolm in 1-2 weeks - call for appointment  follow up with Cardiology Dr. Davis in one week - call for appointment  follow up with your Nephrologist within one week - resume hemodialysis as before Mon, Wed, Fri  check finger sticks 4 times a day

## 2017-06-22 NOTE — DISCHARGE NOTE ADULT - DURABLE MEDICAL EQUIPMENT AGENCY
[facility] [phone] will supply your bipap. Any questions or concerns please call them directly.

## 2017-06-22 NOTE — PROGRESS NOTE ADULT - ASSESSMENT
63 yo Male  with acute on chronic severe systolic CHF (EF 19%), ESRD, DM2, A fib, HCAP  - HCAP -s/p abs  aocd  - Pulmonary fibrosis, COPD - c/w inhalers, no benefit of steroids  - Acute on chronic systolic CHF - continue inotropic support as per Cardio, HD for maximum fluid removal  - ESRD - continue HD as per Renal, continue Midodrine.  - A fib - continue Amio,   - DM2 - controlled, continue with ISS  - LLE decreased ROM -imaging w/ hematomas  sx eval noted  no sx intervention  hold a/c for now  analgesics  pt  discussed w/ pt /family at bedside  wish to resume coumadin /discussed risks w/ pt / family  discussed risks of rebleed  f/u ct a/p without change  d/c planning   cpap as per pulm   prognosis poor /family still wish all measures to be taken for pts medical care  do not wish hospice / pall care  discussed very poor prognosis again w/ pt/ family at bedside and no further medical tx available for pts end stage disease

## 2017-06-22 NOTE — PROGRESS NOTE ADULT - SUBJECTIVE AND OBJECTIVE BOX
CHIEF COMPLAINT:Patient is a 62y old  Male who presents with a chief complaint of syncope (22 Jun 2017 12:35)    	pt without new complaints         PAST MEDICAL & SURGICAL HISTORY:  Chronic hypotension  AF (atrial fibrillation)  COPD (chronic obstructive pulmonary disease): 4L home O2  HLD (hyperlipidemia)  DM (diabetes mellitus)  ESRD (end stage renal disease) on dialysis  BPH (benign prostatic hypertrophy)  Myocardial infarction: 10/2011  Chronic renal insufficiency  Gout  Dyslipidemia  Diabetes mellitus  CHF (congestive heart failure)  AICD (automatic cardioverter/defibrillator) present: [employer] - placed 9/11/09  H/O coronary angiogram          REVIEW OF SYSTEMS:  CONSTITUTIONAL: No fever, weight loss, or fatigue  EYES: No eye pain, visual disturbances, or discharge  NECK: No pain or stiffness  RESPIRATORY: sob / flores  CARDIOVASCULAR: No chest pain, palpitations, passing out, dizziness, or leg swelling  GASTROINTESTINAL: No abdominal or epigastric pain. No nausea, vomiting, or hematemesis; No diarrhea or constipation. No melena or hematochezia.  GENITOURINARY: No dysuria, frequency, hematuria, or incontinence  NEUROLOGICAL: No headaches, memory loss, loss of strength, numbness, or tremors  SKIN: No itching, burning, rashes, or lesions   LYMPH Nodes: No enlarged glands  ENDOCRINE: No heat or cold intolerance; No hair loss  MUSCULOSKELETAL: No joint pain or swelling; No muscle, back, or extremity pain    Medications:  MEDICATIONS  (STANDING):  finasteride 5milliGRAM(s) Oral daily  amiodarone    Tablet 200milliGRAM(s) Oral daily  atorvastatin 20milliGRAM(s) Oral at bedtime  docusate sodium 100milliGRAM(s) Oral daily  midodrine 30milliGRAM(s) Oral every 8 hours  sevelamer hydrochloride 800milliGRAM(s) Oral three times a day with meals  levothyroxine 75MICROGram(s) Oral daily  insulin lispro (HumaLOG) corrective regimen sliding scale  SubCutaneous three times a day before meals  insulin lispro (HumaLOG) corrective regimen sliding scale  SubCutaneous at bedtime  dextrose 5%. 1000milliLiter(s) IV Continuous <Continuous>  dextrose 50% Injectable 12.5Gram(s) IV Push once  dextrose 50% Injectable 25Gram(s) IV Push once  dextrose 50% Injectable 25Gram(s) IV Push once  DOBUTamine Infusion 7MICROgram(s)/kG/Min IV Continuous <Continuous>  acetaminophen   Tablet. 650milliGRAM(s) Oral once  levETIRAcetam  IVPB 500milliGRAM(s) IV Intermittent daily  levETIRAcetam  IVPB 250milliGRAM(s) IV Intermittent daily  buDESOnide 160 MICROgram(s)/formoterol 4.5 MICROgram(s) Inhaler 2Puff(s) Inhalation two times a day  epoetin alba Injectable 21593Lznx(s) IV Push <User Schedule>  glycerin Suppository - Adult 1Suppository(s) Rectal once    MEDICATIONS  (PRN):  dextrose Gel 1Dose(s) Oral once PRN Blood Glucose LESS THAN 70 milliGRAM(s)/deciliter  glucagon  Injectable 1milliGRAM(s) IntraMuscular once PRN Glucose LESS THAN 70 milligrams/deciliter  acetaminophen   Tablet 650milliGRAM(s) Oral every 6 hours PRN For Temp greater than 38 C (100.4 F)  acetaminophen    Suspension. 650milliGRAM(s) Oral every 6 hours PRN Moderate Pain (4 - 6)  artificial tears (preservative free) Ophthalmic Solution 1Drop(s) Both EYES three times a day PRN Dry Eyes  sodium chloride 0.65% Nasal 1Spray(s) Both Nostrils four times a day PRN Nasal Congestion  oxyCODONE  5 mG/acetaminophen 325 mG 2Tablet(s) Oral every 6 hours PRN Severe Pain (7 - 10)    	    PHYSICAL EXAM:  T(C): 36.4, Max: 36.8 (06-21 @ 22:50)  HR: 73 (71 - 85)  BP: 89/49 (85/43 - 109/53)  RR: 18 (18 - 18)  SpO2: 99% (97% - 99%)  Wt(kg): --  I&O's Summary  I & Os for 24h ending 22 Jun 2017 07:00  =============================================  IN: 670.4 ml / OUT: 2400 ml / NET: -1729.6 ml    I & Os for current day (as of 22 Jun 2017 12:51)  =============================================  IN: 293.2 ml / OUT: 0 ml / NET: 293.2 ml      Appearance: Normal	  HEENT:   Normal oral mucosa, PERRL, EOMI	  Lymphatic: No lymphadenopathy  Cardiovascular: Normal S1 S2, No JVD, No murmurs, No edema  Respiratory: dec bs   Gastrointestinal:  Soft, Non-tender, + BS	  Skin: No rashes, No ecchymoses, No cyanosis	  Neurologic: Non-focal/ from  Extremities: Normal range of motion, No clubbing, cyanosis or edema  Vascular: Peripheral pulses palpable 2+ bilaterally    TELEMETRY: 	    ECG:  	  RADIOLOGY:  OTHER: 	  	  LABS:	 	    CARDIAC MARKERS:                                8.2    6.55  )-----------( 172      ( 22 Jun 2017 11:05 )             27.2     06-22    129<L>  |  89<L>  |  31<H>  ----------------------------<  111<H>  3.8   |  26  |  4.91<H>    Ca    8.6      22 Jun 2017 11:05  Mg     1.9     06-22      proBNP:   Lipid Profile:   HgA1c:   TSH:

## 2017-06-22 NOTE — DISCHARGE NOTE ADULT - CARE PROVIDER_API CALL
Jalen Irvin (MD), [facility]  [address] [address] [address]  Phone: [phone]  Fax: [phone] Jalen Irvin (MD), Medicine  [address] [address] [address]  Phone: [phone]  Fax: [phone]    Thaddeus Davis), Cardiovascular Disease; Internal Medicine  [address] [facility] [address]  [place], [place] [id number]  Phone: [phone]  Fax: [phone] Jalen Irvin), Medicine  [address] [facility] [address] [place] [address]  Phone: [phone]  Fax: [phone]    Thaddeus Davis), Cardiovascular Disease; Internal Medicine  [address] [facility] [address]  [place], [place] [id number]  Phone: [phone]  Fax: [phone]    Rex Malcolm), Critical Care Medicine; Internal Medicine; Pulmonary Disease  [address] [facility] [address] [place] [id number]  Phone: [phone]  Fax: [phone]

## 2017-06-22 NOTE — DISCHARGE NOTE ADULT - MEDICATION SUMMARY - MEDICATIONS TO STOP TAKING
I will STOP taking the medications listed below when I get home from the hospital:    Lantus 100 units/mL subcutaneous solution  -- 4 unit(s) subcutaneous once a day (at bedtime)    warfarin 2 mg oral tablet  -- 1 tab(s) by mouth once a day

## 2017-06-22 NOTE — DISCHARGE NOTE ADULT - OTHER SIGNIFICANT FINDINGS
see above 6/1 ID note: - Cefepime 1gram q 24- Flagyl 500 q 12  - Abx for 7 days total then can discontinue and observe- CHF per primary team- Will sign off. Please call with questions or change in status.  6/1 S&S recommended: Mechanical soft with thin liquids diet.  6/1 renal:Pt had HD yesterday, but unable to tolerate PUF today, due to severe hypotension.Treatment terminated after 30 mins, minimal UF achieved.   will reevaluate for HD tomorrow. K controlled  6/2 Pt. is declining palliative care follow up.  6/2 ID: - Cefepime 1gram q 24- Flagyl 500 q 12- Abx for 7 days total then can discontinue and observe- CHF per primary team- Will sign off. Please call with questions or change in status. Elevated WBC and use of ventimask discussed with ID-check CXR, and get blood cultures, likely aspiration PNA-continue current Abxs.  6/2 Renal: ESRD on dialysis: Pt unable to tolerate PUF yesterday due to hypotension.   Will plan for HD tomorrow. BP still borderline. Despite near daily dialysis/uf treatment to help optimize fluid status, pt continues to have pulm edema and dyspnea. Dialysis not adequate to maintain euvolemia, in setting of severe, dobutamine dependent CHF. Very poor prognosis. Moreover, it will not be logistically possible to have permanent 4 times weekly HD treatments in outpt HD unit. f/u w/pall care. Had long discussion regarding current medical condition and poor prognosis with pt, his wife, and son yesterday evening. They are not ready to change medical plan at this time. Will f/u with his wife again today.   c/w renal diet, fluid restriction; Anemia due to chronic kidney disease: Hb below goal, increase Epo to 10K tiw with hd. monitor H/H.  6/2 Med: - HCAP - worsening respiratory status and increasing WBC, will continue antibiotics as per ID, CT chest, BiPAP   - Acute on chronic systolic CHF - continue inotropic support as per Cardio, HD for maximum fluid removal  - ESRD - continue HD as per Renal, continue Midodrine. Renal to address possibility of outpatient HD, as pt refusing any palliative care options.  - A fib - continue Amio, hold Coumadin due to supratheraputic INR. No need to reverse as no active bleeding.  - DM2 - controlled, continue with ISS  - Prognosis very poor, explained to patient and family, however, patient is refusing palliative input at this time and wants aggressive life support measures.  6/2 CXR- persistent bilateral pulmonary edema and small pleural effusions  blood cx negative  6/4 Med: HCAP - stable respiratory status and increasing WBC, will change to Ertapenem, follow up ID, check sputum culture, reattempt CT chest, BiPAP, - Acute on chronic systolic CHF - continue inotropic support as per Cardio, HD for maximum fluid removal, ESRD - continue HD as per Renal, continue Midodrine. Renal to address possibility of outpatient HD, as pt refusing any palliative care options, A fib - continue Amio, hold Coumadin due to supratheraputic INR, which improved after Vit K Po yesterday, will give another dose of 5mg today, Prognosis very poor, explained to patient and family, however, patient is refusing palliative input at this time and wants aggressive life support measures.  6/5 Med: HCAP stable on BiPAP, continue Ertapenem, follow up ID, reattempt CT chest, BiPAP, Acute on chronic systolic CHF - continue inotropic support as per Cardio, HD for maximum fluid removal, ESRD - continue HD as per Renal, continue Midodrine. Renal to address possibility of outpatient HD, as pt refusing any palliative care options. A fib - continue Amio, follow INR, hold Coumadin. DM2 - controlled, continue with ISS. Prognosis very poor, explained to patient and family, however, patient is refusing palliative input at this time and wants aggressive life support measures.  6/5 ID: Ertapenem 500 q 24, CHF per primary team, Agree with plan for CT Chest, Low threshold for MICU eval, Uncertain that WBC is representative of persistent infection vs reactive to overall respiratory status; please be aware that Ertapenem is narrower than Cefepime/Flagyl  6/5 CT chest: Extensive pulmonary fibrosis. Evidence of superimposed mild small airway disease.  6/6 ID: Ertapenem 500 q 24 through 6/7/17 to complete 10 days then discontinue. CHF/Pulm fibrosis per primary team. Low threshold for MICU eval. Palliative eval poor prognosis  6/6 Pulm c/s (Mehrishi): Respiratory failure, acute. HCAP:  Cont antibiotics: ID following, Given the CT scan it is very hard to determine whether there is any underlying pneumonia or not. Pulmonary fibrosis.  Per sister patient had lung biopsy done last year following which he ended up on oxygen. Per her he was not on prednisone or any other treatment for pulmonary fibrosis. The ct scan chest done yesterday reveals extensive fibrosis and bronchiectasis: need to obtain his old records from his pulmonologist. COPD -has bronchiectasis, and some small airways disease: start symbicort. Poor prognosis.   6/6 Renal: ESRD on dialysis.  Pt had PUF yesteday, and completed HD today. Flowsheet reviewed, 1.5kg UF achieved. BP low but stable during treatment. Reassess daily for UF needs. c/w midodrine. Despite near daily dialysis/uf treatment to help optimize fluid status, pt continues to have pulm edema and dyspnea. Dialysis not adequate to maintain euvolemia, in setting of severe, dobutamine dependent CHF and now new dx of pulm fibrosis. Very poor prognosis. Moreover, it will not be logistically possible to have permanent 4 times weekly HD treatments in outpt HD unit. Will continue ongoing discussion with pt and his wife regarding GOC, and ultimately futile to continue HD. Pt is full code at this time.  c/w renal diet, fluid restriction.   6/6 ID:  Ertapenem 500 q 24 through 6/7/17 to complete 10 days then discontinue   Palliative eval poor prognosis, - Will sign off. Please call with further questions.  6/6 EEG: Findings indicate moderate diffuse or multifocal cerebral dysfunction.  There were no epileptiform abnormalities recorded.    6/7 Renal note: ESRD on dialysis improved volume status today. No acute need for HD today. Will plan for HD tomorrow. Continue Midodrine. Very poor prognosis. Renal diet, fluid restriction. Chronic hypotension on Dobutamine gtt. Titration as per CHF service.   Continue Midodrine.  6/7 Medicine note: BIPAP. Continue Dobutamine gtt. ESRD. HD per renal. Continue Midodrine. Continue Amiodarone. Prognosis very poor, explained to patient and family, however, patient is refusing palliative input at this time and wants aggressive life support measures.  6/7 Pulm note: Pulmonary fibrosis. BIPAP PRN. Poor prognosis. Finish 10 day course of Ertapenem today.   6/8- Med- Renal to address possibility of outpatient HD, as pt refusing any palliative care options.  6/8- Renal - cont with HD   6/11 PULM: Respiratory failure, acute.  Plan: BiPAP 10/5: 50% prn  Keep O2 sat greater than 92% Conservative management. PNA: Treated with Antibiotics. Pulmonary fibrosis-   Conservative management. Palliative care consult appreciated.  Continue symbicort /duoneb Keep O2 sat greater than 88%. , renal f/up for HD  6/11 MED:  Prognosis very poor, explained to patient and family, however, patient is refusing palliative input at this time and wants aggressive life support measures.  6/12 Renal: PUF today  6/12 MEd: Look into SNIF for patient as no interest in palliative  6/12 d/c planning  6/13 PULM: Continue symbicort/duoneb, Keep O2 sat greater than 88%  Patients sister wants BiPAP at home and patient has not been retaining CO2 , doubt he will get BiPAP at home, will discuss with !.   6/13 MED:  Prognosis very poor, but palliative care options refused by family and pt, will plan for discharge home with supervision, with outpt HD as per Renal, SNIF/[facility] option declined by pt  6/14 Med: Have neuro f/u for left side weakness, dispo issues  6/14 Pulm: IF possible get Bipap at home, but doubt patient qualifies  6/14 Renal: ESRD: s/p HD yesterday, Rx sheet reviewed, net UF 2.1kg achieved, tolerated well; schedule for PUF today for UF goal 2-2.5kg and HD tomorrow for additional uf 2.5-3kg as tolerated; UF limited by hypotension; c/w renal diet, fluid restriction 1L/day; Very poor prognosis considering comorbidities  6/15 Neuro: Weakness of left lower extremity.  Plan: MRI brain nc, MRI lumbar spine nc.  D/W attending-cannot do MRI 2/2 pt. cannot lay flat for long but will order CT brain to r/o CVA.  6/15 Renal: ESRD on HD: Pt tolerated HD this AM with nearly 2.5kg UF achieved. Will plan for next HD 6/17, unless pt develops respiratory distress and volume overload. THen will arrange for PUF tomorrow  6/15 Pulm: COPD: Continue symbicort/duoneb, Keep O2 sat greater than 88%  Patients sister wants BiPAP at home and patient has not been retaining CO2 , doubt he will get BiPAP at home, will discuss with   6/16 HCT: Limited due to motion. No acute intracranial hemorrhage or mass effect. Involutional and microvascular-type changes which have progressed from the prior exam.  6/16 Lumbar CT: No lumbosacral fracture. No epidural hematoma.  Bilateral iliopsoas intramuscular hematomas, incompletely visualized.   6/16 Surg: - Recommend holding A/C if safe from cardiology/primary team perspective.   - No acute surgical intervention. Hematomas will resorb with time. - OK to continue gentle PT/OOB - Pain control.  6/17 med: hold AC 2/2 hemtoma, continue ionotropic support. continue HD  6/18 MED:  CHF (EF 19%) continue inotropic support as per Cardio, HD for maximum fluid removal, ESRD on HD as per renal, A fib AC on hold c/w amio, HCAP s/p abx, aocd,   Pulmonary fibrosis, COPD - c/w inhalers, no benefit of steroids, Ileopsoas hematoma sx eval noted no sx intervention hold a/c for now, discussed w/ pt family at bedside,  d/c planning to [facility], prognosis poor  6/18 PULM:  s/p acute resp distress with accidental choking on Dentures  have been doing reasonably OK at this time.  Pulmonary fibrosis: no real treatment, cont bipap overnight: as patient seems to get help from it per family. on bipap in the night and cont 5 l of oxygen for now to keep sao2 above 90% all the time  6/19 CXR: Diffuse reticular opacities likely reflecting extensive pulmonary  fibrosis reported on recent CT scan are again seen. Suggestion of small bilateral pleural effusions again noted.  6/20 PULM: Hematoma of iliopsoas muscle, unspecified laterality, initial encounter, stable for now: evaluated by surgery: hold coumadin INR is low now.  Respiratory failure, acute.  on bipap in the night and cont 5 l of oxygen for now to keep sao2 above 90% all the time  6/20 CARDIO: End stage dilated cardiomyopathy s/p multiple  admissions at [facility] and [facility] recently, seen at both institutions by CHF teams, no further advance IV suggested. AF, currently off coumadin due to ileo psoas hematomas. Would ask surgery to re evaluate to see if can be restarted. COPD Anemia -Prognosis guarded   6/20 MED: c/w inhalers, no benefit of steroids, c/w inotropic support as per Cardio, HD for maximum fluid removal as per renal,c/w Midodrine.- A fib - continue Amio,  LLE decreased ROM -imaging w/ hematomas sx eval noted no sx intervention hold a/c for now analgesics pt/  discussed w/ pt //family want to resume AC, need CT  a/p inpt vs Outpt to eval hematoma resolution, still wish all measures to be taken for pts medical care do not wish hospice / pall care discussed very poor prognosis again w/ pt/ family at bedside and no further medical tx available for pts end stage disease    6/21 CT abd/pelvis: Bilateral iliopsoas hematomas are essentially unchanged.  Pulm:  Hematoma of iliopsoas muscle, unspecified laterality, initial encounter.  Plan: stable for now: evaluated by surgery: hold coumadin  INR is low now: repeat ct scan with same hematoma.Cardiomyopathy.  Plan: on dobutamine drip and amiodarone: Also patient has pulmonary fibrosis and has been on amiodarone! Discussed with this wife. Pulmonary fibrosis.  Plan: no real treatment, cont bipap overnight: as patient seems to get help from it per family. He had bx, and it was consistent with rheumatoid arthritis: failed steroids and developed side effects of cellcept> Per Dr Malcolm, he has had multiple consultations and different opinions about his pulmonary disease: H had harris managed conservatively. Pneumonia, bacterial.  Plan: finished the treatment. Respiratory failure, acute.  Plan: on bipap in the night and cont 5 l of oxygen for now to keep sao2 above 90% all the time.   med: HCAP -s/p abs- Pulmonary fibrosis, COPD - c/w inhalers, no benefit of steroids- Acute on chronic systolic CHF - continue inotropic support as per Cardio, HD for maximum fluid removal  - ESRD - continue HD as per Renal, continue Midodrine.- A fib - continue Amio, - DM2 - controlled, continue with ISS  - LLE decreased ROM -imaging w/ hematomas sx eval noted  no sx intervention, hold a/c for now, analgesics, pt discussed w/ pt /family at bedside, wish to resume coumadin, discussed risks of rebleed, will recheck ct a/p  if reabsorbing can resume with increased risk of bleed, d/c planning   6/21 CT A/P: Bilateral iliopsoas hematomas are essentially unchanged.    will hold off on Coumadin as per MD and repeat CT as an outpatient and decision regarding Coumadin to be made then as per Dr. Irvin. Discharge medications reviewed with MD, d/c Lantus, decrease Novolin to 20 and check FS at  home as per MD    Discussed with MD - pt stable for discharge home, pt with no complaints.

## 2017-06-22 NOTE — PROGRESS NOTE ADULT - SUBJECTIVE AND OBJECTIVE BOX
Patient is a 62y old  Male who presents with a chief complaint of syncope (22 Jun 2017 12:35)    pt has been doing ok    using cpap at night  family insists on using cpap at night      Any change in ROS:     MEDICATIONS  (STANDING):  finasteride 5milliGRAM(s) Oral daily  amiodarone    Tablet 200milliGRAM(s) Oral daily  atorvastatin 20milliGRAM(s) Oral at bedtime  docusate sodium 100milliGRAM(s) Oral daily  midodrine 30milliGRAM(s) Oral every 8 hours  sevelamer hydrochloride 800milliGRAM(s) Oral three times a day with meals  levothyroxine 75MICROGram(s) Oral daily  insulin lispro (HumaLOG) corrective regimen sliding scale  SubCutaneous three times a day before meals  insulin lispro (HumaLOG) corrective regimen sliding scale  SubCutaneous at bedtime  dextrose 5%. 1000milliLiter(s) IV Continuous <Continuous>  dextrose 50% Injectable 12.5Gram(s) IV Push once  dextrose 50% Injectable 25Gram(s) IV Push once  dextrose 50% Injectable 25Gram(s) IV Push once  DOBUTamine Infusion 7MICROgram(s)/kG/Min IV Continuous <Continuous>  acetaminophen   Tablet. 650milliGRAM(s) Oral once  levETIRAcetam  IVPB 500milliGRAM(s) IV Intermittent daily  levETIRAcetam  IVPB 250milliGRAM(s) IV Intermittent daily  buDESOnide 160 MICROgram(s)/formoterol 4.5 MICROgram(s) Inhaler 2Puff(s) Inhalation two times a day  epoetin alba Injectable 37761Xxtp(s) IV Push <User Schedule>  glycerin Suppository - Adult 1Suppository(s) Rectal once    MEDICATIONS  (PRN):  dextrose Gel 1Dose(s) Oral once PRN Blood Glucose LESS THAN 70 milliGRAM(s)/deciliter  glucagon  Injectable 1milliGRAM(s) IntraMuscular once PRN Glucose LESS THAN 70 milligrams/deciliter  acetaminophen   Tablet 650milliGRAM(s) Oral every 6 hours PRN For Temp greater than 38 C (100.4 F)  acetaminophen    Suspension. 650milliGRAM(s) Oral every 6 hours PRN Moderate Pain (4 - 6)  artificial tears (preservative free) Ophthalmic Solution 1Drop(s) Both EYES three times a day PRN Dry Eyes  sodium chloride 0.65% Nasal 1Spray(s) Both Nostrils four times a day PRN Nasal Congestion  oxyCODONE  5 mG/acetaminophen 325 mG 2Tablet(s) Oral every 6 hours PRN Severe Pain (7 - 10)    Vital Signs Last 24 Hrs  T(C): 36.6, Max: 36.8 (06-21 @ 22:50)  T(F): 97.8, Max: 98.3 (06-21 @ 22:50)  HR: 89 (72 - 89)  BP: 105/48 (86/50 - 109/53)  BP(mean): --  RR: 18 (18 - 18)  SpO2: 99% (97% - 99%)    I&O's Summary  I & Os for 24h ending 22 Jun 2017 07:00  =============================================  IN: 670.4 ml / OUT: 2400 ml / NET: -1729.6 ml    I & Os for current day (as of 22 Jun 2017 17:15)  =============================================  IN: 918 ml / OUT: 3500 ml / NET: -2582 ml        Physical Exam:   GENERAL: NAD, well-groomed, well-developed  HEENT: BELL/   Atraumatic, Normocephalic  ENMT: No tonsillar erythema, exudates, or enlargement; Moist mucous membranes, Good dentition, No lesions  NECK: Supple, No JVD, Normal thyroid  CHEST/LUNG: Clear to percussion bilaterally; No rales, rhonchi, wheezing, or rubs  CVS: Regular rate and rhythm; No murmurs, rubs, or gallops  GI: : Soft, Nontender, Nondistended; Bowel sounds present  NERVOUS SYSTEM:  Alert & Oriented X3, Good concentration; Motor Strength 5/5 B/L upper and lower extremities; DTRs 2+ intact and symmetric  EXTREMITIES:  2+ Peripheral Pulses, No clubbing, cyanosis, or edema  LYMPH: No lymphadenopathy noted  SKIN: No rashes or lesions  ENDOCRINOLOGY: No Thyromegaly  PSYCH: Appropriate    Labs:                              8.2    6.55  )-----------( 172      ( 22 Jun 2017 11:05 )             27.2                         8.2    7.50  )-----------( 195      ( 21 Jun 2017 07:00 )             27.6                         8.2    7.02  )-----------( 209      ( 20 Jun 2017 07:45 )             27.3                         8.8    10.78 )-----------( 183      ( 19 Jun 2017 20:50 )             30.7                         8.4    8.06  )-----------( 205      ( 19 Jun 2017 06:11 )             28.5     06-22    129<L>  |  89<L>  |  31<H>  ----------------------------<  111<H>  3.8   |  26  |  4.91<H>  06-21    136  |  101  |  19  ----------------------------<  71  3.3<L>   |  36<H>  |  3.15<H>  06-20    132<L>  |  93<L>  |  37<H>  ----------------------------<  81  4.4   |  24  |  6.05<H>  06-19    134<L>  |  94<L>  |  33<H>  ----------------------------<  135<H>  4.8   |  25  |  5.51<H>  06-19    131<L>  |  93<L>  |  31<H>  ----------------------------<  118<H>  4.1   |  21<L>  |  5.03<H>    Ca    8.6      22 Jun 2017 11:05  Ca    7.3<L>      21 Jun 2017 07:00  Mg     1.9     06-22  Mg     1.9     06-21    TPro  8.0  /  Alb  2.8<L>  /  TBili  1.6<H>  /  DBili  x   /  AST  40  /  ALT  16  /  AlkPhos  180<H>  06-19    CAPILLARY BLOOD GLUCOSE  190 (22 Jun 2017 16:51)  117 (22 Jun 2017 11:41)  134 (22 Jun 2017 08:20)  153 (21 Jun 2017 22:22)  133 (21 Jun 2017 17:18)        PT/INR - ( 22 Jun 2017 11:05 )   PT: 17.2 SEC;   INR: 1.52         Studies  Chest X-RAY  CT SCAN Chest   BLADDER: Within normal limits.  REPRODUCTIVE ORGANS: The prostate iswithin normal limits.    BOWEL: No bowel obstruction. Appendix normal.  PERITONEUM: No ascites.  VESSELS:  Atherosclerotic calcifications.  RETROPERITONEUM: No lymphadenopathy.  The iliopsoas muscles are   heterogeneously enlarged bilaterally, compatible with intramuscular   hematomas. These are not significantly changed in size when compared to   6/16/2017.  ABDOMINAL WALL: Anasarca.  BONES: Within normal limits.    IMPRESSION:   Bilateral iliopsoas hematomas are essentially unchanged.  Venous Dopplers: LE:   CT Abdomen  Others

## 2017-06-22 NOTE — DISCHARGE NOTE ADULT - HOME CARE AGENCY
[place] [phone], initial visit will be upon discharge home, a nurse will call to arrange home visit

## 2017-06-22 NOTE — PROGRESS NOTE ADULT - ATTENDING COMMENTS
ABG with no co2 retention  The family insists on going home with bipap and pt does not qualify. Patient to me tells me, that he benefits from bipap. He can sleep well with bipap. He has pretty bad pulmonary fibrosis and has cardiomyopathy: He may have underlying sleep apnea: but no PSG available:

## 2017-06-22 NOTE — PROGRESS NOTE ADULT - SUBJECTIVE AND OBJECTIVE BOX
Pt seen and examined during dialysis  No acute events overnight.   Pt comfortable on NC.     Allergies:  No Known Allergies    Hospital Medications:   MEDICATIONS  (STANDING):  finasteride 5milliGRAM(s) Oral daily  amiodarone    Tablet 200milliGRAM(s) Oral daily  atorvastatin 20milliGRAM(s) Oral at bedtime  docusate sodium 100milliGRAM(s) Oral daily  midodrine 30milliGRAM(s) Oral every 8 hours  sevelamer hydrochloride 800milliGRAM(s) Oral three times a day with meals  levothyroxine 75MICROGram(s) Oral daily  insulin lispro (HumaLOG) corrective regimen sliding scale  SubCutaneous three times a day before meals  insulin lispro (HumaLOG) corrective regimen sliding scale  SubCutaneous at bedtime  dextrose 5%. 1000milliLiter(s) IV Continuous <Continuous>  dextrose 50% Injectable 12.5Gram(s) IV Push once  dextrose 50% Injectable 25Gram(s) IV Push once  dextrose 50% Injectable 25Gram(s) IV Push once  DOBUTamine Infusion 7MICROgram(s)/kG/Min IV Continuous <Continuous>  acetaminophen   Tablet. 650milliGRAM(s) Oral once  levETIRAcetam  IVPB 500milliGRAM(s) IV Intermittent daily  levETIRAcetam  IVPB 250milliGRAM(s) IV Intermittent daily  buDESOnide 160 MICROgram(s)/formoterol 4.5 MICROgram(s) Inhaler 2Puff(s) Inhalation two times a day  epoetin alba Injectable 49260Vuho(s) IV Push <User Schedule>  glycerin Suppository - Adult 1Suppository(s) Rectal once    VITALS:  T(F): 97.5, Max: 98.3 (06-21 @ 22:50)  HR: 73  BP: 89/49  RR: 18  SpO2: 99%  Wt(kg): --  I & Os for 24h ending 06-22 @ 07:00  =============================================  IN: 670.4 ml / OUT: 2400 ml / NET: -1729.6 ml    I & Os for current day (as of 06-22 @ 14:43)  =============================================  IN: 376.4 ml / OUT: 0 ml / NET: 376.4 ml      PHYSICAL EXAM:  Constitutional: NAD  HEENT: anicteric sclera, oropharynx clear, MMM  Neck: No JVD  Respiratory: Bibasilar crackles   Cardiovascular: S1, S2, RRR  Gastrointestinal: BS+, soft, NT/ND  Extremities: No cyanosis or clubbing. No peripheral edema  Neurological: A/O x 3, no focal deficits  Psychiatric: Normal mood, normal affect  : No CVA tenderness. No rosa.   Skin: No rashes  Vascular Access: RIJ tunneled cath     LABS:  06-22    129<L>  |  89<L>  |  31<H>  ----------------------------<  111<H>  3.8   |  26  |  4.91<H>    Ca    8.6      22 Jun 2017 11:05  Mg     1.9     06-22      Creatinine Trend: 4.91 <--, 3.15 <--, 6.05 <--, 5.51 <--, 5.03 <--, 3.76 <--, 4.95 <--, 3.80 <--                        8.2    6.55  )-----------( 172      ( 22 Jun 2017 11:05 )             27.2

## 2017-06-22 NOTE — PROGRESS NOTE ADULT - SUBJECTIVE AND OBJECTIVE BOX
SUBJECTIVE: Lying in bed no c/o CP or SOB  	  MEDICATIONS:  amiodarone    Tablet 200milliGRAM(s) Oral daily  midodrine 30milliGRAM(s) Oral every 8 hours  DOBUTamine Infusion 7MICROgram(s)/kG/Min IV Continuous <Continuous>      buDESOnide 160 MICROgram(s)/formoterol 4.5 MICROgram(s) Inhaler 2Puff(s) Inhalation two times a day    acetaminophen   Tablet 650milliGRAM(s) Oral every 6 hours PRN  acetaminophen   Tablet. 650milliGRAM(s) Oral once  acetaminophen    Suspension. 650milliGRAM(s) Oral every 6 hours PRN  levETIRAcetam  IVPB 500milliGRAM(s) IV Intermittent daily  levETIRAcetam  IVPB 250milliGRAM(s) IV Intermittent daily  oxyCODONE  5 mG/acetaminophen 325 mG 2Tablet(s) Oral every 6 hours PRN    docusate sodium 100milliGRAM(s) Oral daily  glycerin Suppository - Adult 1Suppository(s) Rectal once    finasteride 5milliGRAM(s) Oral daily  atorvastatin 20milliGRAM(s) Oral at bedtime  levothyroxine 75MICROGram(s) Oral daily  insulin lispro (HumaLOG) corrective regimen sliding scale  SubCutaneous three times a day before meals  insulin lispro (HumaLOG) corrective regimen sliding scale  SubCutaneous at bedtime  dextrose Gel 1Dose(s) Oral once PRN  dextrose 50% Injectable 12.5Gram(s) IV Push once  dextrose 50% Injectable 25Gram(s) IV Push once  dextrose 50% Injectable 25Gram(s) IV Push once  glucagon  Injectable 1milliGRAM(s) IntraMuscular once PRN    dextrose 5%. 1000milliLiter(s) IV Continuous <Continuous>  artificial tears (preservative free) Ophthalmic Solution 1Drop(s) Both EYES three times a day PRN  sodium chloride 0.65% Nasal 1Spray(s) Both Nostrils four times a day PRN  epoetin alba Injectable 08723Yewp(s) IV Push <User Schedule>  v    REVIEW OF SYSTEMS:    CONSTITUTIONAL: No fever, weight loss, or fatigue  EYES: No eye pain, visual disturbances, or discharge  NECK: No pain or stiffness  RESPIRATORY: No cough, wheezing, chills or hemoptysis; No Shortness of Breath  CARDIOVASCULAR: No chest pain, palpitations, dizziness, or leg swelling  GASTROINTESTINAL: No abdominal or epigastric pain. No nausea, vomiting, or hematemesis; No diarrhea or constipation. No melena or hematochezia.  GENITOURINARY: No dysuria, frequency, hematuria, or incontinence  NEUROLOGICAL: No headaches, memory loss, loss of strength, numbness, or tremors  SKIN: No itching, burning, rashes, or lesions   LYMPH Nodes: No enlarged glands  MUSCULOSKELETAL: No joint pain or swelling; No muscle, back, or extremity pain  All other review of systems are negative.  	  [ ] Unable to obtain    PHYSICAL EXAM:  T(C): 36.8, Max: 36.8 (06-21 @ 22:50)  HR: 85 (71 - 85)  BP: 86/50 (85/43 - 109/53)  RR: 18 (18 - 18)  SpO2: 99% (97% - 99%)  Wt(kg): --  I&O's Summary  I & Os for 24h ending 22 Jun 2017 07:00  =============================================  IN: 670.4 ml / OUT: 2400 ml / NET: -1729.6 ml    I & Os for current day (as of 22 Jun 2017 10:19)  =============================================  IN: 272.4 ml / OUT: 0 ml / NET: 272.4 ml        PHYSICAL EXAM    Appearance: Normal	  HEENT:   Normal oral mucosa, PERRL, EOMI	  NECK: Soft and supple, No LAD, No JVD  Cardiovascular: Regular Rate and Rhythm, Normal S1 S2, No murmurs, No clicks, gallops or rubs  Respiratory: Lungs clear to auscultation	  Gastrointestinal:  Soft, Non-tender, + BS	  Skin: No rashes, No ecchymoses, No cyanosis  Neurologic: Non-focal  Extremities: No clubbing, cyanosis or edema  Vascular: Peripheral pulses palpable 2+ bilaterally    TELEMETRY: 	    ECG:  	  RADIOLOGY  DIAGNOSTIC TESTING:  [ ] Echocardiogram:  [ ] Catheterization:  [ ] Stress Test:    OTHER: 	    LABS:	 	    CARDIAC MARKERS:                                  8.2    7.50  )-----------( 195      ( 21 Jun 2017 07:00 )             27.6     06-21    136  |  101  |  19  ----------------------------<  71  3.3<L>   |  36<H>  |  3.15<H>    Ca    7.3<L>      21 Jun 2017 07:00  Mg     1.9     06-21      proBNP:   Lipid Profile:   HgA1c:   TSH:

## 2017-06-22 NOTE — DISCHARGE NOTE ADULT - HOSPITAL COURSE
61 y/o male, with a PmHx of NICM with severe LV dysfunction (EF 19%), s/p Biotronic ICD placement on Dobutamine gtt at home, ESRD on HD M/W/F, atrial fibrillation on Coumadin, COPD on 4L home O2, HLD, DM, chronic hypotension on Midodrine, presents to ED S/P syncopal episode.     + Syncope- ICD interrogation normal, likely hypotension induced  + Acute on chronic combined systolic and diastolic left and right sided CHF exacerbation- renal following (Sophie) for HD set up, HF team following, BIPAP PRN  + Severe non-ischemic cardiomyopathy- CHF team following, on Dobutamine gtt  + ESRD- renal following (Sophie), on daily HD as of 5/16  + HCAP- ID following (Dr. Medellin), S/P Ertapenem IV x 10 day course, completed 6/7   + Moderate diffuse slowing on EEG- neuro following, possibly asterixis, on Keppra IV   + Extensive pulmonary fibrosis- pulm following, on BIPAP PRN  +Bilateral iliopsoas intramuscular hematomas (no more Coumadin as per Dr. Irvin)      6/13 RRT hypotension and ?unresponsive? poss. seizure  06/19 RRT for vomiting and unresponsiveness  EKG: Sinus tachycardia @ 105 bpm, LAD  CE x 2 (Trop: 0.28-->0.27)                 Platelets: 133                  BUN/Cr: 29/5.71  Alk phos: 166                 ProBNP: 54,480  5/9 CXR - Perihilar reticular and groundglass opacities consistent with pulmonary edema. Moderate pulmonary edema with likely small right pleural effusion.  5/9 EP interrogation - normal pacing and sensing, no events       6/21 CT A/P: Bilateral iliopsoas hematomas are essentially unchanged.    will hold off on Coumadin as per MD and repeat CT as an outpatient and decision regarding Coumadin to be made then as per Dr. Irvin. Discharge medications reviewed with alvino DISLA/declan Ramírez, decrease Novolin to 20 and check FS at  home as per MD    Discussed with MD - pt stable for discharge home, pt with no complaints. 61 y/o male, with a PmHx of NICM with severe LV dysfunction (EF 19%), s/p Biotronic ICD placement on Dobutamine gtt at home, ESRD on HD M/W/F, atrial fibrillation on Coumadin, COPD on 4L home O2, HLD, DM, chronic hypotension on Midodrine, presents to ED S/P syncopal episode.   + Syncope- ICD interrogation normal, likely hypotension induced  + Acute on chronic combined systolic and diastolic left and right sided CHF exacerbation- renal following (Sophie) for HD set up, HF team following, BIPAP PRN  + Severe non-ischemic cardiomyopathy- CHF team following, on Dobutamine gtt  + ESRD- renal following (Sophie), on daily HD as of   + HCAP- ID following (Dr. Medellin), S/P Ertapenem IV x 10 day course, completed    + Moderate diffuse slowing on EEG- neuro following, possibly asterixis, on Keppra IV   + Extensive pulmonary fibrosis- pulm following, on BIPAP PRN  +Bilateral iliopsoas intramuscular hematomas (no more Coumadin as per Dr. Irvin)  EKG: Sinus tachycardia @ 105 bpm, LAD  CE x 2 (Trop: 0.28-->0.27)                 Platelets: 133                  BUN/Cr: 29/5.71  Alk phos: 166                 ProBNP: 54,480   CXR - Perihilar reticular and groundglass opacities consistent with pulmonary edema. Moderate pulmonary edema with likely small right pleural effusion.   EP interrogation - normal pacing and sensing, no events  5/10 Med - Call CHF c/s  5/10 Cards c/s Dr. Bone - Severe non ischemic cardiomyopathy not many options left, consider uptitrating  or more HD for fluid removal  5/10 CHF c/s - contine aggresive HD, will get RHC when INR <2.0  5/10 Renal c/s Dr. Singh - ESRD tolerated HD yesterday, plan for PPUF today to optimize fluid states, Anemia- Hg at goal, can resume low dose of epo 4000, hypotension- dobutamine titration as per cardiology continue midodrine.   CHF f/u - Acute on chronic systolic HF, cont HD as tolerated for diuresis, cont dobutamine gtt, RHC when INR less than 2, ICD interrogation revealed no events. Cont aggressive HD, RHC when INR less than 2, decrease dobutamine gtt to 2.5.    Renal f/u - Repeat HD .    Med f/u - Syncope likely secondary to orthostatic hypotension, acute on chronic systolic CHF- on dobutamine gtt, RHC when INR less than 2. ESRD on HD- cont HD, A-fib- cont Amiodarone, Coumadin held for RHC.    CHF f/u - hold coumdin, RHC monday, cont aggressive diuresis    Renal f/u - HD yesterday, improving voluem status , will plan for HD tomorrow x 3 hrs followed by UF 1hrs    Med f/u -  gtt RHC per cards  5/15 CHF f/u - Continue , plan for RHC, aggressive hd for fluid removal, unable to tolerate afterload reduction 2/2 hypotension, midodrine, will need coumadin dosed, holding for RHC  5/15 Med f/u - RHC per cards  5/15 Renal f/u -  gtt per cards, RHC but patient not willing for procedure   RHC - RA , RV 61/29/39, PCW29//, PA Sat 42.8%, AO sat 96%, /70/87, CO 3.6, CI 1.65, RFV accessed    Medicine f/u - RHC today. contiue dobutamine gtt. HD for fluid removal. coumadin held for RHC. continue amio   Heart Failure f/u - will liekly increase  to 5mcg/kg/min. and will require more frequent HD probably 4 days. consider palliative support   Renal f/u - HD today tolerated BP c/w midodrine pre-HD, c/w renal diet, fluid restriction   Renal f/u - Problem: ESRD (end stage renal disease) on dialysis.  Plan: HD yesterday, with UF 2.9kg achieved. Flow sheet reviewed. CHF note reviewed.Plan for isolated UF today to help optimize fluid status. HD again tomorrow.   Problem: Chronic hypotension.  Plan: Dobutamine dose increased, as per CHF service.   Increase UF goal as tolerated. Cont midodrine preHD.    Palliative Care c/s - Pt was on HD three times a week prior to this admission, currently requiring more frequent HD/UF.  will have to determine plan from Renal and Heart failure team if increased frequency of HD/UF is temporary or pt will need it for long term. Pt is on 4L of home O2- appears comfortable on exam. Spoke with pt regarding his medical conditions and prognosis. He understands that he has worsening heart failure despite being on inotropic support and is requiring more frequent HD. He confirmed that he wants to be DNR/DNI. Spoke with him regarding possible hospice and pt not agreeable for hospice at this time. Attempted to call wife (at [phone]) to set up a family meeting, however, unable to reach   HF f/u - Continue with dobutamine to 5mcg/kg/min. Pt is dependent on dobutamine.  Max fluid removal with HD. Had a long discussion with patient's sister on request per pt regarding his condition. Will follow up psych and paliative evaluations.    Nephro f/u -  Problem: ESRD on HD, tolerated PUF yesterday. 2kg UF achieved.HD today goal UF goal 2.5-3kg. K controlled. UF tomorrow for fluid optimization. unsure if possible to dialyze 4 times weekly in outpt setting. Will confirm with HD unit. Agree with pall care consult to discuss GOC, ESRD with frequent volume overload and hypotension.  Chronic hypotension: Dobutamine increased, as per HF. Increase UF goal as tolerated. Cont midodrine preHD.    Palliative f/u - Acute/chronic SHF/DHF, EF 19%, on dobutamine at home and has needed increased dose of dobutamine during hospital stay. Requiring more frequent HD 2/2 vol overload. HF following, c/w dobutamine, chronic hypotension and on midodrine 30 mg po q8h. ESRD HD, HD three x week currently more frequent HD/UF: will have to determine plan from Renal and Heart failure team if increased frequency of HD/UF is temporary or pt will need it for long term. COPD 4L of home O2. Dyspnea: [place]: improved since admission. c/w O2, requires assistance with some of his ADLs, c/w supportive care  PT as tolerated, Spoke with wife (Otto Plascencia) today, she is primary caregiver for pt at home and has difficulty managing pt  She understands poor prognosis and wants pt to be comfortable. Pts sisters have difficulty accepting prognosis. are having a difficult time accepting pt's poor prognosis which has been complicating pt's overall care. Family meeting set up for Friday at 11 am       Renal f/u - s/p HD    Palliative f/u - meeting with wife    Renal f/u - s/p HD - next HD 17 Repeat CXR - A right dialysis catheter is noted. There is a left-sided  cardiac device. Heart size is enlarged. Bilateral increased interstitial  markings are noted likely related to pulmonary edema demonstrating slight  interval progression since the prior study. There is no pneumothorax. There is a left PICC line as on the prior study.   Medicine f/u - 61 yo M with acute on chronic severe systolic CHF (EF 19%), ESRD, DM2, A fib: Acute on chronic systolic CHF - s/p right heart cath, dependent on Dobutamine drip, requiring more frequent HD for fluid removal, prognosis very poor, follow with CHF team. ESRD - continue HD as per Renal, continue Midodrine, outpatient hd as per renal. A fib - resumed Coumadin, continue with Amio. DM2 - controlled, continue with ISS. Poor prognosis, arranging transportation for 4 weekly HD sessions and home services for discharge planning, will continue to follow with Palliative   Renal f/u - Will not be logistically possible to have permanent 4 times weekly HD treatments in outpt HD unit.    Cards f/u - Overall prognosis is quite poor.  His best treatment is HD 4 times per week or 3 times per week (4 hours).  He has not responded to any other modalities.  Continue present CV meds.    Med f/u - with acute on chronic severe systolic CHF (EF 19%), ESRD, DM2, A fib. Progressive SOB - off nonrebreather, SOB at baseline. Acute on chronic systolic CHF - s/p right heart cath, dependent on Dobutamine drip, requiring more frequent HD for fluid removal, prognosis very poor, follow with CHF team. ESRD - continue HD as per Renal, continue Midodrine, outpatient hd as per renal. A fib - Coumadin, continue with Amio  DM2 - controlled, continue with ISS. Lt arm swelling - located below Lt PICC site, will check US duplex for possible DVT. Poor prognosis, arranging transportation for 4 weekly HD sessions and home services for discharge planning, will continue to follow with Palliative   HF f/u - Acute on chronic systolic heart failure: -End stage HF on chronic dobutamine gtt at 5 mg/kg/min. Continue. -Not on HF meds due to hypotension. -Maximum fluid removal via HD; Right groin site pain:-Since pt had RHC at groin, will get US to r/o any abnormality- hematoma or pseudoaneurysm. -Will give Motrin 400 mg po x 1 for pain.   Meds f/u - Progressive SOB - off nonrebreather, SOB at baseline. Acute on chronic systolic CHF - s/p right heart cath, dependent on Dobutamine drip, requiring more frequent HD for fluid removal, prognosis very poor, follow with CHF team, ESRD - continue HD as per Renal, c/w Midodrine, outpatient hd as per renal , A fib - Coumadin, continue with Amio DM2 - controlled, continue with ISS Lt arm swelling - located below Lt PICC site, will check US duplex for possible DVT. Poor prognosis, arranging transportation for 4 weekly HD sessions and home services for discharge planning, will continue to follow with Palliative   Cards f/u - On Dobutamin gtt, Await U/S re: R groin pain, HD per Nephrology   LUE duplex - no evidence of DVT in left upper extremity    Rt LE arterial duplex - negative for pseudoaneurysm   RRT - called for worsening Hypotension . now improved not a candidate for CCU at this time    Renal f/u - Outpt hd schedule TTS, Electrolytes acceptable. Worsening pulm edema. Will need UF session today- 2h with 1.5 to 2h kg uF. Despite near daily dialysis treatment to help optimize fluid status, pt continues to have worsening pulm edema and dyspnea. Dialysis not adequate to maintain euvolemia, in setting of severe, dobutamine dependent CHF. Very poor prognosis. Moreover, it will not be logistically possible to have permanent 4 times weekly HD treatments in outpt HD unit. Agree with pall care f/u to discuss GOC. c/w renal diet, fluid restriction.    Med f/u - Acute on chronic systolic CHF - continue inotropic support as per Cardio, HD for maximum fluid removal - ESRD - continue HD as per Renal, continue Midodrine  Palliative service.f/u pt family not interested in hospice or palliative but pt end stage w/ no alternative tx which would help spoke w/ family at bedside   ID c/s - Fever, Based on the clinical history and imaging and symptoms of thick green sputum, HCAP is the most probable diagnosis. Would recommend Vancomycin and Cefepime, d/c zithromycin- no need. Would d/c zosyn because zosyn has high sodium content, would switch to cefepime. No evidence of line sepsis, he has three lines and none of them look infected. Will d/w attending and follow up cultures    Med f/u - HCAP - continue antibiotics as per ID, continue BiPAP, no need for CT as diagnosis is evident clinically, Acute on chronic systolic CHF - continue inotropic support as per Cardio, HD for maximum fluid removal, ESRD - continue HD as per Renal, continue Midodrine,  Prognosis very poor, explained to patient and family, however desires all possible treatment options including CPR/intubation, will follow with palliative.   Renal f/u - ESRD on dialysis. HD today, Despite near daily dialysis treatment to help optimize fluid status, pt continues to have worsening pulm edema and dyspnea. Dialysis not adequate to maintain euvolemia, in setting of severe, dobutamine dependent CHF. Very poor prognosis. Moreover, it will not be logistically possible to have permanent 4 times weekly HD treatments in outpt HD unit. Agree with pall care f/u to discuss GOC.  on  drip, c/w midodrine, inc epogen 2k>4k tiw with hd. Fever. ?source. PC grossly doesn't appear infected. s/p vanco, and zithro. c/w zosyn consider ID eval. f/u w/blood cxs, rpt CXR. dose abxs for ESRD.    Neuro c/s - Convulsion. Likely febrile convulsions. Would obtain 24 hr EEG. However, pt on BIPAP which may impede lead placement. Will place if possible but if not will wait until pt stabilized off BIPAP. No AEDS for now. Seizure precautions. neuro checks q4. Consider MRI when stable if weakness does not improve.   Medicine/Renal f/u - continue HD, likely needs 4 HD /wk   ID f/u - if decomp consider switch to zosyn, on cef, flagyl and vanc by level   CHF f/u - 61 y/o male w/ acute on chronic combined systolic/ diastolic HF on chronic Dobutamine infusion, ESRD on HD. Still on BiPAP. Will have HD later today. Will need more aggressive fluid removal as an outpatient to keep patient euvolemic. Recommend at least 4x week HD/PUF as outpatient. Palliative following. Afebrile and no leukocytosis today. S/p IV abx. Cultures have been negative.    Speech & Swallow eval - given pateint's respiratory is comprimised and patient not tolerating nasal cannula status, a clinical swallow eval is deffered at this time, as PO intake will increase risk for aspiration due to respiratory is comprimised at this time.   Med f/u - hold coumadin due to supratherapeutic INR, no DVT in left arm, prognosis very poor, Palliative care follow up   Renal f/u - ESRD on HD, plan for HD today, UF goal will likely be limited by hypotension, will reevaluate for isolated UF tomorrow, dialysis not adequate to maitain evulemia in setting of sever dobutamine dependent CHF, very poor prognosis.

## 2017-06-22 NOTE — DISCHARGE NOTE ADULT - MEDICATION SUMMARY - MEDICATIONS TO CHANGE
I will SWITCH the dose or number of times a day I take the medications listed below when I get home from the hospital:    midodrine 10 mg oral tablet  -- 3 tab(s) by mouth every 8 hours    NovoLIN 70/30 subcutaneous suspension  -- 25 unit(s) subcutaneous once a day

## 2017-06-22 NOTE — DISCHARGE NOTE ADULT - PROVIDER TOKENS
HAMZAH:'[id number]' TOKSUNDAY:'[id number]',HAMZAH:'[id number]' TOKEN:'[id number]',TOKEN:'[id number]',TOKEN:'[id number]'

## 2017-06-22 NOTE — PROGRESS NOTE ADULT - ASSESSMENT
63 yo M with acute on chronic severe systolic CHF (EF 19%), ESRD, DM2, A fib, HCAP  s/p acute resp distress with accidental choking on Dentures   have been doing reasonably OK at this time. :: ILEOPSOAS HEMATOMA NOTED

## 2017-06-22 NOTE — PROGRESS NOTE ADULT - ASSESSMENT
End stage CMP  Iliopsoas hematomas  Agrewe with surgical eval re: timing of restarting AC  Cont HD as primary means of volume optimization

## 2017-06-23 VITALS
HEART RATE: 85 BPM | TEMPERATURE: 98 F | SYSTOLIC BLOOD PRESSURE: 98 MMHG | RESPIRATION RATE: 18 BRPM | DIASTOLIC BLOOD PRESSURE: 59 MMHG | OXYGEN SATURATION: 94 %

## 2017-06-23 LAB
BASOPHILS # BLD AUTO: 0.03 K/UL — SIGNIFICANT CHANGE UP (ref 0–0.2)
BASOPHILS NFR BLD AUTO: 0.4 % — SIGNIFICANT CHANGE UP (ref 0–2)
BUN SERPL-MCNC: 23 MG/DL — SIGNIFICANT CHANGE UP (ref 7–23)
CALCIUM SERPL-MCNC: 7.5 MG/DL — LOW (ref 8.4–10.5)
CHLORIDE SERPL-SCNC: 93 MMOL/L — LOW (ref 98–107)
CO2 SERPL-SCNC: 24 MMOL/L — SIGNIFICANT CHANGE UP (ref 22–31)
CREAT SERPL-MCNC: 4.03 MG/DL — HIGH (ref 0.5–1.3)
EOSINOPHIL # BLD AUTO: 0.29 K/UL — SIGNIFICANT CHANGE UP (ref 0–0.5)
EOSINOPHIL NFR BLD AUTO: 3.7 % — SIGNIFICANT CHANGE UP (ref 0–6)
GLUCOSE SERPL-MCNC: 84 MG/DL — SIGNIFICANT CHANGE UP (ref 70–99)
HCT VFR BLD CALC: 29.9 % — LOW (ref 39–50)
HGB BLD-MCNC: 8.8 G/DL — LOW (ref 13–17)
IMM GRANULOCYTES NFR BLD AUTO: 0.3 % — SIGNIFICANT CHANGE UP (ref 0–1.5)
INR BLD: 1.51 — HIGH (ref 0.88–1.17)
LYMPHOCYTES # BLD AUTO: 1.79 K/UL — SIGNIFICANT CHANGE UP (ref 1–3.3)
LYMPHOCYTES # BLD AUTO: 22.9 % — SIGNIFICANT CHANGE UP (ref 13–44)
MAGNESIUM SERPL-MCNC: 1.9 MG/DL — SIGNIFICANT CHANGE UP (ref 1.6–2.6)
MCHC RBC-ENTMCNC: 29.4 % — LOW (ref 32–36)
MCHC RBC-ENTMCNC: 29.9 PG — SIGNIFICANT CHANGE UP (ref 27–34)
MCV RBC AUTO: 101.7 FL — HIGH (ref 80–100)
MONOCYTES # BLD AUTO: 0.95 K/UL — HIGH (ref 0–0.9)
MONOCYTES NFR BLD AUTO: 12.1 % — SIGNIFICANT CHANGE UP (ref 2–14)
NEUTROPHILS # BLD AUTO: 4.74 K/UL — SIGNIFICANT CHANGE UP (ref 1.8–7.4)
NEUTROPHILS NFR BLD AUTO: 60.6 % — SIGNIFICANT CHANGE UP (ref 43–77)
PLATELET # BLD AUTO: 204 K/UL — SIGNIFICANT CHANGE UP (ref 150–400)
PMV BLD: 11.9 FL — SIGNIFICANT CHANGE UP (ref 7–13)
POTASSIUM SERPL-MCNC: 4.2 MMOL/L — SIGNIFICANT CHANGE UP (ref 3.5–5.3)
POTASSIUM SERPL-SCNC: 4.2 MMOL/L — SIGNIFICANT CHANGE UP (ref 3.5–5.3)
PROTHROM AB SERPL-ACNC: 17.1 SEC — HIGH (ref 9.8–13.1)
RBC # BLD: 2.94 M/UL — LOW (ref 4.2–5.8)
RBC # FLD: 23.5 % — HIGH (ref 10.3–14.5)
SODIUM SERPL-SCNC: 132 MMOL/L — LOW (ref 135–145)
WBC # BLD: 7.82 K/UL — SIGNIFICANT CHANGE UP (ref 3.8–10.5)
WBC # FLD AUTO: 7.82 K/UL — SIGNIFICANT CHANGE UP (ref 3.8–10.5)

## 2017-06-23 RX ADMIN — LEVETIRACETAM 400 MILLIGRAM(S): 250 TABLET, FILM COATED ORAL at 18:11

## 2017-06-23 RX ADMIN — SEVELAMER CARBONATE 800 MILLIGRAM(S): 2400 POWDER, FOR SUSPENSION ORAL at 09:06

## 2017-06-23 RX ADMIN — SEVELAMER CARBONATE 800 MILLIGRAM(S): 2400 POWDER, FOR SUSPENSION ORAL at 12:55

## 2017-06-23 RX ADMIN — Medication 100 MILLIGRAM(S): at 12:55

## 2017-06-23 RX ADMIN — LEVETIRACETAM 400 MILLIGRAM(S): 250 TABLET, FILM COATED ORAL at 12:58

## 2017-06-23 RX ADMIN — Medication 75 MICROGRAM(S): at 05:24

## 2017-06-23 RX ADMIN — MIDODRINE HYDROCHLORIDE 30 MILLIGRAM(S): 2.5 TABLET ORAL at 13:49

## 2017-06-23 RX ADMIN — MIDODRINE HYDROCHLORIDE 30 MILLIGRAM(S): 2.5 TABLET ORAL at 05:24

## 2017-06-23 RX ADMIN — SEVELAMER CARBONATE 800 MILLIGRAM(S): 2400 POWDER, FOR SUSPENSION ORAL at 18:32

## 2017-06-23 RX ADMIN — FINASTERIDE 5 MILLIGRAM(S): 5 TABLET, FILM COATED ORAL at 12:55

## 2017-06-23 RX ADMIN — AMIODARONE HYDROCHLORIDE 200 MILLIGRAM(S): 400 TABLET ORAL at 05:25

## 2017-06-23 RX ADMIN — BUDESONIDE AND FORMOTEROL FUMARATE DIHYDRATE 2 PUFF(S): 160; 4.5 AEROSOL RESPIRATORY (INHALATION) at 10:00

## 2017-06-23 NOTE — PROGRESS NOTE ADULT - NSHPATTENDINGPLANDISCUSS_GEN_ALL_CORE
pt
pt, RN, family bedside
pt, family bedside
pt, family bedside
pt, wife bedside
pt, wife bedside
pt, wife bedside, NP
pt, wife, PA
pt
pa
Dr Malcolm, Dr Irvin
pa, attending
pt, family bedside
pt, family bedside, HD RN
neurology residents, nurse

## 2017-06-23 NOTE — PROGRESS NOTE ADULT - PROBLEM/PLAN-3
DISPLAY PLAN FREE TEXT

## 2017-06-23 NOTE — PROGRESS NOTE ADULT - PROBLEM SELECTOR PROBLEM 3
COPD (chronic obstructive pulmonary disease)
Pulmonary fibrosis
Anemia due to chronic kidney disease
Pulmonary fibrosis
Anemia due to chronic kidney disease
COPD (chronic obstructive pulmonary disease)
Pulmonary fibrosis
Anemia due to chronic kidney disease

## 2017-06-23 NOTE — PROGRESS NOTE ADULT - PROBLEM SELECTOR PLAN 3
Cont epo to 20k tiw with HD. Trend Hemoglobin.  Psoas hematoma noted.  PLans to resume anticoagulation, as per primary team and cards. Currently coumadin on hold.

## 2017-06-23 NOTE — PROGRESS NOTE ADULT - SUBJECTIVE AND OBJECTIVE BOX
Pt seen and examined. Sitting up in chair.  Denies SOB. Pt awake and comfortable.   No respiratory distress.     Allergies:  No Known Allergies    Hospital Medications:   MEDICATIONS  (STANDING):  finasteride 5milliGRAM(s) Oral daily  amiodarone    Tablet 200milliGRAM(s) Oral daily  atorvastatin 20milliGRAM(s) Oral at bedtime  docusate sodium 100milliGRAM(s) Oral daily  midodrine 30milliGRAM(s) Oral every 8 hours  sevelamer hydrochloride 800milliGRAM(s) Oral three times a day with meals  levothyroxine 75MICROGram(s) Oral daily  insulin lispro (HumaLOG) corrective regimen sliding scale  SubCutaneous three times a day before meals  insulin lispro (HumaLOG) corrective regimen sliding scale  SubCutaneous at bedtime  dextrose 5%. 1000milliLiter(s) IV Continuous <Continuous>  dextrose 50% Injectable 12.5Gram(s) IV Push once  dextrose 50% Injectable 25Gram(s) IV Push once  dextrose 50% Injectable 25Gram(s) IV Push once  DOBUTamine Infusion 7MICROgram(s)/kG/Min IV Continuous <Continuous>  acetaminophen   Tablet. 650milliGRAM(s) Oral once  levETIRAcetam  IVPB 500milliGRAM(s) IV Intermittent daily  levETIRAcetam  IVPB 250milliGRAM(s) IV Intermittent daily  buDESOnide 160 MICROgram(s)/formoterol 4.5 MICROgram(s) Inhaler 2Puff(s) Inhalation two times a day  epoetin alba Injectable 66829Iheh(s) IV Push <User Schedule>  glycerin Suppository - Adult 1Suppository(s) Rectal once    VITALS:  T(F): 98.4, Max: 98.4 (06-23 @ 05:22)  HR: 75  BP: 92/63  RR: 18  SpO2: 93%  Wt(kg): --    I & Os for current day (as of 06-23 @ 12:47)  =============================================  IN: 1438.4 ml / OUT: 3500 ml / NET: -2061.6 ml      PHYSICAL EXAM:  Constitutional: NAD  HEENT: anicteric sclera, oropharynx clear, MMM  Neck: No JVD  Respiratory: CTAB, no wheezes, rales or rhonchi  Cardiovascular: S1, S2, RRR  Gastrointestinal: BS+, soft, NT/ND  Extremities: No cyanosis or clubbing. No peripheral edema  Neurological: A/O x 3, no focal deficits  Psychiatric: Normal mood, normal affect  : No CVA tenderness. No rosa.   Skin: No rashes  Vascular Access: RIJ Tunneled cath     LABS:  06-23    132<L>  |  93<L>  |  23  ----------------------------<  84  4.2   |  24  |  4.03<H>    Ca    7.5<L>      23 Jun 2017 07:00  Mg     1.9     06-23      Creatinine Trend: 4.03 <--, 4.91 <--, 3.15 <--, 6.05 <--, 5.51 <--, 5.03 <--, 3.76 <--, 4.95 <--                        8.8    7.82  )-----------( 204      ( 23 Jun 2017 07:00 )             29.9     Urine Studies:      RADIOLOGY & ADDITIONAL STUDIES:

## 2017-06-23 NOTE — PROGRESS NOTE ADULT - PROBLEM SELECTOR PROBLEM 5
Debility
Cardiomyopathy
ESRD (end stage renal disease) on dialysis
Cardiomyopathy
Cardiomyopathy
Constipation
Debility
ESRD (end stage renal disease) on dialysis
Respiratory failure, acute

## 2017-06-23 NOTE — PROGRESS NOTE ADULT - SUBJECTIVE AND OBJECTIVE BOX
CHIEF COMPLAINT:Patient is a 62y old  Male who presents with a chief complaint of syncope (22 Jun 2017 12:35)    	  pt without new complaints      PAST MEDICAL & SURGICAL HISTORY:  Chronic hypotension  AF (atrial fibrillation)  COPD (chronic obstructive pulmonary disease): 4L home O2  HLD (hyperlipidemia)  DM (diabetes mellitus)  ESRD (end stage renal disease) on dialysis  BPH (benign prostatic hypertrophy)  Myocardial infarction: 10/2011  Chronic renal insufficiency  Gout  Dyslipidemia  Diabetes mellitus  CHF (congestive heart failure)  AICD (automatic cardioverter/defibrillator) present: Biotronic - placed 9/11/09  H/O coronary angiogram          REVIEW OF SYSTEMS:  CONSTITUTIONAL: No fever, weight loss, or fatigue  EYES: No eye pain, visual disturbances, or discharge  NECK: No pain or stiffness  RESPIRATORY: flores/ sob  CARDIOVASCULAR: No chest pain, palpitations, passing out, dizziness, or leg swelling  GASTROINTESTINAL: No abdominal or epigastric pain. No nausea, vomiting, or hematemesis; No diarrhea or constipation. No melena or hematochezia.  GENITOURINARY: No dysuria, frequency, hematuria, or incontinence  NEUROLOGICAL: No headaches, memory loss, loss of strength, numbness, or tremors  SKIN: No itching, burning, rashes, or lesions   LYMPH Nodes: No enlarged glands  ENDOCRINE: No heat or cold intolerance; No hair loss  MUSCULOSKELETAL: No joint pain or swelling; No muscle, back, or extremity pain    Medications:  MEDICATIONS  (STANDING):  finasteride 5milliGRAM(s) Oral daily  amiodarone    Tablet 200milliGRAM(s) Oral daily  atorvastatin 20milliGRAM(s) Oral at bedtime  docusate sodium 100milliGRAM(s) Oral daily  midodrine 30milliGRAM(s) Oral every 8 hours  sevelamer hydrochloride 800milliGRAM(s) Oral three times a day with meals  levothyroxine 75MICROGram(s) Oral daily  insulin lispro (HumaLOG) corrective regimen sliding scale  SubCutaneous three times a day before meals  insulin lispro (HumaLOG) corrective regimen sliding scale  SubCutaneous at bedtime  dextrose 5%. 1000milliLiter(s) IV Continuous <Continuous>  dextrose 50% Injectable 12.5Gram(s) IV Push once  dextrose 50% Injectable 25Gram(s) IV Push once  dextrose 50% Injectable 25Gram(s) IV Push once  DOBUTamine Infusion 7MICROgram(s)/kG/Min IV Continuous <Continuous>  acetaminophen   Tablet. 650milliGRAM(s) Oral once  levETIRAcetam  IVPB 500milliGRAM(s) IV Intermittent daily  levETIRAcetam  IVPB 250milliGRAM(s) IV Intermittent daily  buDESOnide 160 MICROgram(s)/formoterol 4.5 MICROgram(s) Inhaler 2Puff(s) Inhalation two times a day  epoetin alba Injectable 01922Erbg(s) IV Push <User Schedule>  glycerin Suppository - Adult 1Suppository(s) Rectal once    MEDICATIONS  (PRN):  dextrose Gel 1Dose(s) Oral once PRN Blood Glucose LESS THAN 70 milliGRAM(s)/deciliter  glucagon  Injectable 1milliGRAM(s) IntraMuscular once PRN Glucose LESS THAN 70 milligrams/deciliter  acetaminophen   Tablet 650milliGRAM(s) Oral every 6 hours PRN For Temp greater than 38 C (100.4 F)  acetaminophen    Suspension. 650milliGRAM(s) Oral every 6 hours PRN Moderate Pain (4 - 6)  artificial tears (preservative free) Ophthalmic Solution 1Drop(s) Both EYES three times a day PRN Dry Eyes  sodium chloride 0.65% Nasal 1Spray(s) Both Nostrils four times a day PRN Nasal Congestion  oxyCODONE  5 mG/acetaminophen 325 mG 2Tablet(s) Oral every 6 hours PRN Severe Pain (7 - 10)    	    PHYSICAL EXAM:  T(C): 36.9, Max: 36.9 (06-23 @ 05:22)  HR: 75 (69 - 112)  BP: 92/63 (87/53 - 105/48)  RR: 18 (18 - 18)  SpO2: 93% (93% - 100%)  Wt(kg): --  I&O's Summary    I & Os for current day (as of 23 Jun 2017 11:40)  =============================================  IN: 1438.4 ml / OUT: 3500 ml / NET: -2061.6 ml      Appearance: Normal	  HEENT:   Normal oral mucosa, PERRL, EOMI	  Lymphatic: No lymphadenopathy  Cardiovascular: Normal S1 S2, No JVD, No murmurs, No edema  Respiratory: dec bs  Psychiatry: A & O x 3, Mood & affect appropriate  Gastrointestinal:  Soft, Non-tender, + BS	  Skin: No rashes, No ecchymoses, No cyanosis	  Neurologic: Non-focal  Extremities: Normal range of motion, No clubbing, cyanosis / tr edema  Vascular: Peripheral pulses palpable 2+ bilaterally    TELEMETRY: 	    ECG:  	  RADIOLOGY:  OTHER: 	  	  LABS:	 	    CARDIAC MARKERS:                                8.8    7.82  )-----------( 204      ( 23 Jun 2017 07:00 )             29.9     06-23    132<L>  |  93<L>  |  23  ----------------------------<  84  4.2   |  24  |  4.03<H>    Ca    7.5<L>      23 Jun 2017 07:00  Mg     1.9     06-23      proBNP:   Lipid Profile:   HgA1c:   TSH:

## 2017-06-23 NOTE — PROGRESS NOTE ADULT - PROBLEM SELECTOR PLAN 4
2/2 CHF. Increase UF w/HD as tolerated. c/w renal diet, fluid restriction. d/w pt
Continue symbicort/duoneb, Keep O2 sat greater than 88%  Patients sister wants BiPAP at home and patient has not been retaining CO2 , doubt he will get BiPAP at home, will discuss with !
2/2 CHF. better. Increase UF w/HD as tolerated. c/w low Na in diet, fluid restriction. d/w pt
2/2 CHF. better. Increase UF w/HD as tolerated. c/w low Na in diet, fluid restriction. d/w pt
2/2 CHF. better. Increase UF w/HD as tolerated. c/w renal diet, fluid restriction. d/w pt
Cont Bronchodilators
Cont Bronchodilators
Continue symbicort/duoneb
Continue symbicort/duoneb  Keep O2 sat greater than 88%
Continue symbicort/duoneb  Keep O2 sat greater than 88%
Continue symbicort/duoneb, Keep O2 sat greater than 88%  Patients sister wants BiPAP at home and patient has not been retaining CO2 , doubt he will get BiPAP at home, will discuss with !
Pt with dyspnea on exertion, reports shortness of breath while at rest has improved since admission  - pt not in any distress currently  - c/w O2 supplementation.
finished the treatment
?source. PC grossly doesn't appear infected. s/p vanco, and zithro. c/w zosyn consider ID eval. f/u w/blood cxs, rpt CXR. dose abxs for ESRD
finished the treatment
Cont Bronchodilators
Pt with dyspnea on exertion, reports shortness of breath while at rest has improved since admission  - pt not in any distress currently  - c/w O2 supplementation.

## 2017-06-23 NOTE — PROGRESS NOTE ADULT - PROBLEM/PLAN-1
DISPLAY PLAN FREE TEXT

## 2017-06-23 NOTE — PROGRESS NOTE ADULT - PROBLEM SELECTOR PROBLEM 4
Dyspnea
COPD (chronic obstructive pulmonary disease)
Dyspnea
Hyponatremia
Pneumonia, bacterial
Fever
Hyponatremia

## 2017-06-23 NOTE — PROGRESS NOTE ADULT - ASSESSMENT
63 yo Male  with acute on chronic severe systolic CHF (EF 19%), ESRD, DM2, A fib, HCAP  - HCAP -s/p abs  aocd  - Pulmonary fibrosis, COPD - c/w current meds  - Acute on chronic systolic CHF - continue inotropic support as per Cardio, HD for maximum fluid removal  - ESRD - continue HD as per Renal, continue Midodrine.  - A fib - continue Amio,   - DM2 - controlled, continue with ISS  - LLE decreased ROM -imaging w/ hematomas  sx eval noted  no sx intervention  hold a/c for now  analgesics  pt  discussed w/ pt /family at bedside  wish to resume coumadin /discussed risks w/ pt / family  discussed risks of rebleed  f/u ct a/p without change  d/c planning   cpap as per pulm   prognosis poor /family still wish all measures to be taken for pts medical care  do not wish hospice / pall care  discussed very poor prognosis again w/ pt/ family at bedside and no further medical tx available for pts end stage disease

## 2017-06-23 NOTE — PROGRESS NOTE ADULT - PROBLEM SELECTOR PLAN 5
on dialysis
Pt with end stage heart failure and ESRD. Requires assistance with some of his ADLs  - c/w supportive care  - PT as tolerated  - reports no issues with appetite.
on bipap in the night and cont 5 l of oxygen for now to keep sao2 above 90% all the time.
on dialysis
on dobutamine drip: has end stage heart disease: card following.
on dobutamine drip: has end stage heart disease: card following.
stool softeners/laxatives per primary team
stool softeners/laxatives per primary team
on bipap in the night and cont 5 l of oxygen for now to keep sao2 above 90% all the time.
Pt with end stage heart failure and ESRD. Requires assistance with some of his ADLs  - c/w supportive care  - PT as tolerated  - reports no issues with appetite.
on dobutamine drip: has end stage heart disease: card following.
stool softeners/laxatives per primary team

## 2017-06-23 NOTE — PROGRESS NOTE ADULT - PROVIDER SPECIALTY LIST ADULT
Cardiology
Heart Failure
Infectious Disease
Internal Medicine
Nephrology
Neurology
Neurology
Palliative Care
Pulmonology
Infectious Disease
Neurology
Neurosurgery
Cardiology
Palliative Care
Pulmonology

## 2017-06-23 NOTE — PROGRESS NOTE ADULT - PROBLEM SELECTOR PROBLEM 2
ESRD (end stage renal disease) on dialysis
Pneumonia, bacterial
Chronic hypotension
Chronic hypotension
Pneumonia, bacterial
Cardiomyopathy
Chronic hypotension
ESRD (end stage renal disease) on dialysis
Pneumonia, bacterial
Cardiomyopathy
Cardiomyopathy
CHF (congestive heart failure)
Chronic hypotension

## 2017-06-23 NOTE — PROGRESS NOTE ADULT - SUBJECTIVE AND OBJECTIVE BOX
No clinical change  Remains on Dobutamine @ 7 mcg/kg/min  BP 92/63  HR 85 (AF)  Decreased BS bilateral with crackles  Irregular rhythm  1+ edema    INR 1.51    Imp: Scheduled for DC to home this evening after HD.  His family continues to refuse all Hospice / palliative care services.  He has a very poor prognosis and is at very high risk for readmission.  Continue present CV regimen.

## 2017-06-23 NOTE — PROGRESS NOTE ADULT - PROBLEM SELECTOR PLAN 1
Tolerating HD yesterday. Had PUF x1 hour, followed by 3 hour HD.   UF goal 3 kg achieved. BP steady during HD.   Very poor prognosis considering end stage comorbidities.  Outpt HD schedule is MWF. Will plan for PUF today x2 hours for fluid optimization, prior to discharge.

## 2017-06-23 NOTE — PROGRESS NOTE ADULT - PROBLEM SELECTOR PROBLEM 1
Acute on chronic combined systolic (congestive) and diastolic (congestive) heart failure
ESRD (end stage renal disease) on dialysis
ESRD (end stage renal disease) on dialysis
Respiratory failure, acute
Acute on chronic combined systolic (congestive) and diastolic (congestive) heart failure
ESRD (end stage renal disease) on dialysis
Hematoma of iliopsoas muscle, unspecified laterality, initial encounter
Respiratory failure, acute
Weakness of left lower extremity
Hematoma of iliopsoas muscle, unspecified laterality, initial encounter
Hematoma of iliopsoas muscle, unspecified laterality, initial encounter
Acute on chronic combined systolic (congestive) and diastolic (congestive) heart failure
Acute on chronic systolic congestive heart failure
Acute on chronic systolic congestive heart failure
ESRD (end stage renal disease) on dialysis
ESRD (end stage renal disease) on dialysis
Jerking movements of extremities
Weakness of left lower extremity

## 2017-07-18 NOTE — PROGRESS NOTE ADULT - PROBLEM SELECTOR PLAN 3
(0) independent Conservative management. Risk, benefit ratio discussed with the patient's wife. Palliative care consult appreciated

## 2017-10-15 ENCOUNTER — INPATIENT (INPATIENT)
Facility: HOSPITAL | Age: 62
LOS: 3 days | Discharge: HOME HEALTH | End: 2017-10-19
Attending: HOSPITALIST | Admitting: HOSPITALIST
Payer: COMMERCIAL

## 2017-10-15 VITALS
SYSTOLIC BLOOD PRESSURE: 96 MMHG | RESPIRATION RATE: 18 BRPM | WEIGHT: 187.39 LBS | TEMPERATURE: 98 F | HEART RATE: 70 BPM | OXYGEN SATURATION: 99 % | DIASTOLIC BLOOD PRESSURE: 70 MMHG

## 2017-10-15 DIAGNOSIS — J44.9 CHRONIC OBSTRUCTIVE PULMONARY DISEASE, UNSPECIFIED: ICD-10-CM

## 2017-10-15 DIAGNOSIS — I48.91 UNSPECIFIED ATRIAL FIBRILLATION: ICD-10-CM

## 2017-10-15 DIAGNOSIS — R55 SYNCOPE AND COLLAPSE: ICD-10-CM

## 2017-10-15 DIAGNOSIS — I50.22 CHRONIC SYSTOLIC (CONGESTIVE) HEART FAILURE: ICD-10-CM

## 2017-10-15 DIAGNOSIS — E78.5 HYPERLIPIDEMIA, UNSPECIFIED: ICD-10-CM

## 2017-10-15 DIAGNOSIS — N18.6 END STAGE RENAL DISEASE: ICD-10-CM

## 2017-10-15 DIAGNOSIS — Z29.9 ENCOUNTER FOR PROPHYLACTIC MEASURES, UNSPECIFIED: ICD-10-CM

## 2017-10-15 DIAGNOSIS — I95.89 OTHER HYPOTENSION: ICD-10-CM

## 2017-10-15 LAB
ALBUMIN SERPL ELPH-MCNC: 3.5 G/DL — SIGNIFICANT CHANGE UP (ref 3.3–5)
ALP SERPL-CCNC: 174 U/L — HIGH (ref 40–120)
ALT FLD-CCNC: 18 U/L — SIGNIFICANT CHANGE UP (ref 4–41)
APTT BLD: 45.1 SEC — HIGH (ref 27.5–37.4)
AST SERPL-CCNC: 21 U/L — SIGNIFICANT CHANGE UP (ref 4–40)
BASE EXCESS BLDV CALC-SCNC: 5.3 MMOL/L — SIGNIFICANT CHANGE UP
BASOPHILS # BLD AUTO: 0.02 K/UL — SIGNIFICANT CHANGE UP (ref 0–0.2)
BASOPHILS NFR BLD AUTO: 0.4 % — SIGNIFICANT CHANGE UP (ref 0–2)
BILIRUB SERPL-MCNC: 0.6 MG/DL — SIGNIFICANT CHANGE UP (ref 0.2–1.2)
BLOOD GAS VENOUS - CREATININE: 5.79 MG/DL — HIGH (ref 0.5–1.3)
BUN SERPL-MCNC: 38 MG/DL — HIGH (ref 7–23)
CALCIUM SERPL-MCNC: 9.4 MG/DL — SIGNIFICANT CHANGE UP (ref 8.4–10.5)
CHLORIDE BLDV-SCNC: 96 MMOL/L — SIGNIFICANT CHANGE UP (ref 96–108)
CHLORIDE SERPL-SCNC: 90 MMOL/L — LOW (ref 98–107)
CK MB BLD-MCNC: 4.7 NG/ML — SIGNIFICANT CHANGE UP (ref 1–6.6)
CK MB BLD-MCNC: 4.73 NG/ML — SIGNIFICANT CHANGE UP (ref 1–6.6)
CK MB BLD-MCNC: SIGNIFICANT CHANGE UP (ref 0–2.5)
CK MB BLD-MCNC: SIGNIFICANT CHANGE UP (ref 0–2.5)
CK SERPL-CCNC: 75 U/L — SIGNIFICANT CHANGE UP (ref 30–200)
CK SERPL-CCNC: 77 U/L — SIGNIFICANT CHANGE UP (ref 30–200)
CO2 SERPL-SCNC: 27 MMOL/L — SIGNIFICANT CHANGE UP (ref 22–31)
CREAT SERPL-MCNC: 6.03 MG/DL — HIGH (ref 0.5–1.3)
EOSINOPHIL # BLD AUTO: 0.12 K/UL — SIGNIFICANT CHANGE UP (ref 0–0.5)
EOSINOPHIL NFR BLD AUTO: 2.4 % — SIGNIFICANT CHANGE UP (ref 0–6)
GAS PNL BLDV: 129 MMOL/L — LOW (ref 136–146)
GLUCOSE BLDC GLUCOMTR-MCNC: 187 MG/DL — HIGH (ref 70–99)
GLUCOSE BLDC GLUCOMTR-MCNC: 88 MG/DL — SIGNIFICANT CHANGE UP (ref 70–99)
GLUCOSE BLDV-MCNC: 118 — HIGH (ref 70–99)
GLUCOSE SERPL-MCNC: 108 MG/DL — HIGH (ref 70–99)
HBA1C BLD-MCNC: 6 % — HIGH (ref 4–5.6)
HBV SURFACE AG SER-ACNC: NEGATIVE — SIGNIFICANT CHANGE UP
HCO3 BLDV-SCNC: 27 MMOL/L — SIGNIFICANT CHANGE UP (ref 20–27)
HCT VFR BLD CALC: 37 % — LOW (ref 39–50)
HCT VFR BLDV CALC: 35.9 % — LOW (ref 39–51)
HGB BLD-MCNC: 11.5 G/DL — LOW (ref 13–17)
HGB BLDV-MCNC: 11.6 G/DL — LOW (ref 13–17)
IMM GRANULOCYTES # BLD AUTO: 0.01 # — SIGNIFICANT CHANGE UP
IMM GRANULOCYTES NFR BLD AUTO: 0.2 % — SIGNIFICANT CHANGE UP (ref 0–1.5)
INR BLD: 1.41 — HIGH (ref 0.88–1.17)
LACTATE BLDV-MCNC: 2.2 MMOL/L — HIGH (ref 0.5–2)
LYMPHOCYTES # BLD AUTO: 1.08 K/UL — SIGNIFICANT CHANGE UP (ref 1–3.3)
LYMPHOCYTES # BLD AUTO: 21.8 % — SIGNIFICANT CHANGE UP (ref 13–44)
MCHC RBC-ENTMCNC: 28.8 PG — SIGNIFICANT CHANGE UP (ref 27–34)
MCHC RBC-ENTMCNC: 31.1 % — LOW (ref 32–36)
MCV RBC AUTO: 92.7 FL — SIGNIFICANT CHANGE UP (ref 80–100)
MONOCYTES # BLD AUTO: 0.59 K/UL — SIGNIFICANT CHANGE UP (ref 0–0.9)
MONOCYTES NFR BLD AUTO: 11.9 % — SIGNIFICANT CHANGE UP (ref 2–14)
NEUTROPHILS # BLD AUTO: 3.14 K/UL — SIGNIFICANT CHANGE UP (ref 1.8–7.4)
NEUTROPHILS NFR BLD AUTO: 63.3 % — SIGNIFICANT CHANGE UP (ref 43–77)
NRBC # FLD: 0 — SIGNIFICANT CHANGE UP
PCO2 BLDV: 53 MMHG — HIGH (ref 41–51)
PH BLDV: 7.37 PH — SIGNIFICANT CHANGE UP (ref 7.32–7.43)
PLATELET # BLD AUTO: 138 K/UL — LOW (ref 150–400)
PMV BLD: 11.9 FL — SIGNIFICANT CHANGE UP (ref 7–13)
PO2 BLDV: < 24 MMHG — LOW (ref 35–40)
POTASSIUM BLDV-SCNC: 4.9 MMOL/L — HIGH (ref 3.4–4.5)
POTASSIUM SERPL-MCNC: 5.2 MMOL/L — SIGNIFICANT CHANGE UP (ref 3.5–5.3)
POTASSIUM SERPL-SCNC: 5.2 MMOL/L — SIGNIFICANT CHANGE UP (ref 3.5–5.3)
PROT SERPL-MCNC: 8.5 G/DL — HIGH (ref 6–8.3)
PROTHROM AB SERPL-ACNC: 15.9 SEC — HIGH (ref 9.8–13.1)
RBC # BLD: 3.99 M/UL — LOW (ref 4.2–5.8)
RBC # FLD: 18.1 % — HIGH (ref 10.3–14.5)
SAO2 % BLDV: 24 % — LOW (ref 60–85)
SODIUM SERPL-SCNC: 134 MMOL/L — LOW (ref 135–145)
TROPONIN T SERPL-MCNC: 0.35 NG/ML — HIGH (ref 0–0.06)
TROPONIN T SERPL-MCNC: 0.36 NG/ML — HIGH (ref 0–0.06)
TSH SERPL-MCNC: 6.11 UIU/ML — HIGH (ref 0.27–4.2)
WBC # BLD: 4.96 K/UL — SIGNIFICANT CHANGE UP (ref 3.8–10.5)
WBC # FLD AUTO: 4.96 K/UL — SIGNIFICANT CHANGE UP (ref 3.8–10.5)

## 2017-10-15 PROCEDURE — 93010 ELECTROCARDIOGRAM REPORT: CPT

## 2017-10-15 PROCEDURE — 70450 CT HEAD/BRAIN W/O DYE: CPT | Mod: 26

## 2017-10-15 PROCEDURE — 71010: CPT | Mod: 26

## 2017-10-15 PROCEDURE — 99223 1ST HOSP IP/OBS HIGH 75: CPT

## 2017-10-15 RX ORDER — INSULIN NPH HUM/REG INSULIN HM 70-30/ML
20 VIAL (ML) SUBCUTANEOUS
Qty: 0 | Refills: 0 | COMMUNITY

## 2017-10-15 RX ORDER — AMIODARONE HYDROCHLORIDE 400 MG/1
200 TABLET ORAL DAILY
Qty: 0 | Refills: 0 | Status: DISCONTINUED | OUTPATIENT
Start: 2017-10-15 | End: 2017-10-19

## 2017-10-15 RX ORDER — DEXTROSE 50 % IN WATER 50 %
25 SYRINGE (ML) INTRAVENOUS ONCE
Qty: 0 | Refills: 0 | Status: DISCONTINUED | OUTPATIENT
Start: 2017-10-15 | End: 2017-10-19

## 2017-10-15 RX ORDER — DOBUTAMINE HCL 250MG/20ML
7.49 VIAL (ML) INTRAVENOUS
Qty: 500 | Refills: 0 | Status: DISCONTINUED | OUTPATIENT
Start: 2017-10-15 | End: 2017-10-19

## 2017-10-15 RX ORDER — LEVOTHYROXINE SODIUM 125 MCG
75 TABLET ORAL DAILY
Qty: 0 | Refills: 0 | Status: DISCONTINUED | OUTPATIENT
Start: 2017-10-15 | End: 2017-10-19

## 2017-10-15 RX ORDER — INSULIN LISPRO 100/ML
VIAL (ML) SUBCUTANEOUS
Qty: 0 | Refills: 0 | Status: DISCONTINUED | OUTPATIENT
Start: 2017-10-15 | End: 2017-10-19

## 2017-10-15 RX ORDER — BUDESONIDE AND FORMOTEROL FUMARATE DIHYDRATE 160; 4.5 UG/1; UG/1
2 AEROSOL RESPIRATORY (INHALATION)
Qty: 0 | Refills: 0 | Status: DISCONTINUED | OUTPATIENT
Start: 2017-10-15 | End: 2017-10-19

## 2017-10-15 RX ORDER — DOBUTAMINE HCL 250MG/20ML
7.47 VIAL (ML) INTRAVENOUS
Qty: 1000 | Refills: 0 | Status: DISCONTINUED | OUTPATIENT
Start: 2017-10-15 | End: 2017-10-15

## 2017-10-15 RX ORDER — DOCUSATE SODIUM 100 MG
100 CAPSULE ORAL DAILY
Qty: 0 | Refills: 0 | Status: DISCONTINUED | OUTPATIENT
Start: 2017-10-15 | End: 2017-10-19

## 2017-10-15 RX ORDER — DOBUTAMINE HCL 250MG/20ML
7 VIAL (ML) INTRAVENOUS
Qty: 500 | Refills: 0 | Status: DISCONTINUED | OUTPATIENT
Start: 2017-10-15 | End: 2017-10-15

## 2017-10-15 RX ORDER — MIDODRINE HYDROCHLORIDE 2.5 MG/1
10 TABLET ORAL EVERY 8 HOURS
Qty: 0 | Refills: 0 | Status: DISCONTINUED | OUTPATIENT
Start: 2017-10-15 | End: 2017-10-19

## 2017-10-15 RX ORDER — FINASTERIDE 5 MG/1
5 TABLET, FILM COATED ORAL DAILY
Qty: 0 | Refills: 0 | Status: DISCONTINUED | OUTPATIENT
Start: 2017-10-15 | End: 2017-10-19

## 2017-10-15 RX ORDER — DEXTROSE 50 % IN WATER 50 %
12.5 SYRINGE (ML) INTRAVENOUS ONCE
Qty: 0 | Refills: 0 | Status: DISCONTINUED | OUTPATIENT
Start: 2017-10-15 | End: 2017-10-19

## 2017-10-15 RX ORDER — ATORVASTATIN CALCIUM 80 MG/1
80 TABLET, FILM COATED ORAL AT BEDTIME
Qty: 0 | Refills: 0 | Status: DISCONTINUED | OUTPATIENT
Start: 2017-10-15 | End: 2017-10-19

## 2017-10-15 RX ORDER — INSULIN LISPRO 100/ML
VIAL (ML) SUBCUTANEOUS AT BEDTIME
Qty: 0 | Refills: 0 | Status: DISCONTINUED | OUTPATIENT
Start: 2017-10-15 | End: 2017-10-19

## 2017-10-15 RX ORDER — DEXTROSE 50 % IN WATER 50 %
1 SYRINGE (ML) INTRAVENOUS ONCE
Qty: 0 | Refills: 0 | Status: DISCONTINUED | OUTPATIENT
Start: 2017-10-15 | End: 2017-10-19

## 2017-10-15 RX ORDER — ASPIRIN/CALCIUM CARB/MAGNESIUM 324 MG
81 TABLET ORAL DAILY
Qty: 0 | Refills: 0 | Status: DISCONTINUED | OUTPATIENT
Start: 2017-10-15 | End: 2017-10-19

## 2017-10-15 RX ORDER — ATORVASTATIN CALCIUM 80 MG/1
20 TABLET, FILM COATED ORAL AT BEDTIME
Qty: 0 | Refills: 0 | Status: DISCONTINUED | OUTPATIENT
Start: 2017-10-15 | End: 2017-10-15

## 2017-10-15 RX ORDER — SEVELAMER CARBONATE 2400 MG/1
800 POWDER, FOR SUSPENSION ORAL
Qty: 0 | Refills: 0 | Status: DISCONTINUED | OUTPATIENT
Start: 2017-10-15 | End: 2017-10-19

## 2017-10-15 RX ORDER — GLUCAGON INJECTION, SOLUTION 0.5 MG/.1ML
1 INJECTION, SOLUTION SUBCUTANEOUS ONCE
Qty: 0 | Refills: 0 | Status: DISCONTINUED | OUTPATIENT
Start: 2017-10-15 | End: 2017-10-19

## 2017-10-15 RX ORDER — DOBUTAMINE HCL 250MG/20ML
7 VIAL (ML) INTRAVENOUS
Qty: 1000 | Refills: 0 | Status: DISCONTINUED | OUTPATIENT
Start: 2017-10-15 | End: 2017-10-15

## 2017-10-15 RX ORDER — ONDANSETRON 8 MG/1
4 TABLET, FILM COATED ORAL ONCE
Qty: 0 | Refills: 0 | Status: COMPLETED | OUTPATIENT
Start: 2017-10-15 | End: 2017-10-15

## 2017-10-15 RX ADMIN — MIDODRINE HYDROCHLORIDE 10 MILLIGRAM(S): 2.5 TABLET ORAL at 21:26

## 2017-10-15 RX ADMIN — BUDESONIDE AND FORMOTEROL FUMARATE DIHYDRATE 2 PUFF(S): 160; 4.5 AEROSOL RESPIRATORY (INHALATION) at 22:27

## 2017-10-15 RX ADMIN — ONDANSETRON 4 MILLIGRAM(S): 8 TABLET, FILM COATED ORAL at 13:39

## 2017-10-15 RX ADMIN — Medication 17.85 MICROGRAM(S)/KG/MIN: at 21:15

## 2017-10-15 RX ADMIN — ATORVASTATIN CALCIUM 80 MILLIGRAM(S): 80 TABLET, FILM COATED ORAL at 21:26

## 2017-10-15 RX ADMIN — SEVELAMER CARBONATE 800 MILLIGRAM(S): 2400 POWDER, FOR SUSPENSION ORAL at 18:51

## 2017-10-15 RX ADMIN — Medication 17.85 MICROGRAM(S)/KG/MIN: at 13:47

## 2017-10-15 RX ADMIN — Medication 17.85 MICROGRAM(S)/KG/MIN: at 18:51

## 2017-10-15 NOTE — H&P ADULT - PROBLEM SELECTOR PLAN 5
CHADS2 score=2  will hold coumadin until CT head w/o contrast results CHADS2 score=3  will hold coumadin until CT head w/o contrast results

## 2017-10-15 NOTE — H&P ADULT - HISTORY OF PRESENT ILLNESS
62yM hx ESRD on HD (MWF), NICM with severe LV dysfnxn (EF 19%), biotronic ICD, Seizure (off Keppra since 7/2017), L upper arm PICC with home dobutamine drip, afib on coumadin, COPD on 3-4L home o2, hld, DM, and hypotension on midodrine p/w dizziness, lightheadedness, and feeling like passing out today. Pt was sitting in the [employer], when suddenly felt dizzy, lightheaded, SOB, nauseous and felt like passing out. He vomited 2 episodes of bilious colored emesis and family called 911 to take him to the hospital. Pt also c/o of LUE and LLE numbness and weakness since Thursday (4 days). Wife reports for past 2 days when pt taken of oxygen for about 1 min pt gets slightly confused. He denies facial droop, difficulty speaking, or changes in vision. Pt's last BM was yesterday, brown colored, formed stool. Pt denies chest pain, palpitations, diaphoresis, or abdominal pain.

## 2017-10-15 NOTE — CONSULT NOTE ADULT - SUBJECTIVE AND OBJECTIVE BOX
HPI:  Pt is a 61 yo M with a PMH of ESRD on HD (MWF), NICM with severe LV dysfnxn (EF 19%), biotronic ICD, Seizure (off Keppra since 7/2017), L upper arm PICC with home dobutamine drip, A-Fib on coumadin, COPD on 3-4L home o2, HLD, DM, and Hypotension on midodrine p/w dizziness, lightheadedness, and feeling like passing out today. Pt was sitting in the [employer], when suddenly felt dizzy, lightheaded, SOB, nauseous and felt like passing out. He vomited 2 episodes of bilious colored emesis and family called 911 to take him to the hospital. Of note pt's wife reports for past 2 days when pt taken of oxygen for about 1 min pt gets slightly confused.    Neurology was consulted as pt also had a complaint of left sided weakness for the past 4 days, which has been causing difficulty walking. Pt endorses that he would have to lift his left leg off the bed to get out. Weakness is not worsening or improving. He also has some pain in his left shoulder and numbness near his left thigh. Otherwise pt denies any recent HA, facial droop, CP, SOB, cough, abdominal pain, changes in BMs/urination. NIHSS: 2. MRS: 3.      MEDICATIONS  (STANDING):  amiodarone    Tablet 200 milliGRAM(s) Oral daily  aspirin enteric coated 81 milliGRAM(s) Oral daily  atorvastatin 80 milliGRAM(s) Oral at bedtime  buDESOnide 160 MICROgram(s)/formoterol 4.5 MICROgram(s) Inhaler 2 Puff(s) Inhalation two times a day  dextrose 50% Injectable 12.5 Gram(s) IV Push once  dextrose 50% Injectable 25 Gram(s) IV Push once  dextrose 50% Injectable 25 Gram(s) IV Push once  DOBUTamine Infusion 7.494 MICROgram(s)/kG/Min (17.85 mL/Hr) IV Continuous <Continuous>  docusate sodium 100 milliGRAM(s) Oral daily  finasteride 5 milliGRAM(s) Oral daily  insulin lispro (HumaLOG) corrective regimen sliding scale   SubCutaneous three times a day before meals  insulin lispro (HumaLOG) corrective regimen sliding scale   SubCutaneous at bedtime  levothyroxine 75 MICROGram(s) Oral daily  midodrine 10 milliGRAM(s) Oral every 8 hours  sevelamer hydrochloride 800 milliGRAM(s) Oral three times a day with meals    MEDICATIONS  (PRN):  dextrose Gel 1 Dose(s) Oral once PRN Blood Glucose LESS THAN 70 milliGRAM(s)/deciliter  glucagon  Injectable 1 milliGRAM(s) IntraMuscular once PRN Glucose LESS THAN 70 milligrams/deciliter    PAST MEDICAL & SURGICAL HISTORY:  Seizure: Off Keppra since 7/2017  Chronic hypotension  AF (atrial fibrillation)  COPD (chronic obstructive pulmonary disease): 4L home O2  HLD (hyperlipidemia)  DM (diabetes mellitus): Off Insulin since 7/2017  ESRD (end stage renal disease) on dialysis  BPH (benign prostatic hypertrophy)  Myocardial infarction: 10/2011  Gout  CHF (congestive heart failure)  AICD (automatic cardioverter/defibrillator) present: Biotronic - placed 9/11/09  H/O coronary angiogram    FAMILY HISTORY:  No pertinent family history in first degree relatives    Allergies    No Known Allergies    Intolerances        SHx - No smoking, No ETOH, No drug abuse      Review of Systems:  See HPI.      Vital Signs Last 24 Hrs  T(C): 36.5 (15 Oct 2017 18:05), Max: 36.8 (15 Oct 2017 15:28)  T(F): 97.7 (15 Oct 2017 18:05), Max: 98.2 (15 Oct 2017 15:28)  HR: 81 (15 Oct 2017 18:05) (66 - 82)  BP: 103/65 (15 Oct 2017 18:05) (96/70 - 111/56)  BP(mean): --  RR: 18 (15 Oct 2017 18:05) (16 - 22)  SpO2: 100% (15 Oct 2017 18:05) (92% - 100%)    General Exam:   General appearance: No acute distress    Neurological Exam:  Mental Status: Orientated to self, date and place. Attention intact. No dysarthria. Speech fluent.  Cranial Nerves: PERRL, EOMI, VFF, no nystagmus. CN V1-3 intact to light touch, pinprick, vibration and temperature b/l. No facial asymmetry. Hearing intact to finger rub bilaterally. Tongue, uvula and palate midline. Sternocleidomastoid and Trapezius intact bilaterally.    Motor:   Tone: normal.                  Strength:     [] Upper extremity                      Delt       Bicep    Tricep                                                  R         3/5        5/5        5/5       5/5 (but effort dependant)                                               L          5/5 5/5 5/5 5/5  [] Lower extremity                       HF          KE          KF        DF         PF                                               R        3/5        5/5        5/5       5/5       4+/5 (but effort dependant)                                               L         5/5 5/5 5/5 5/5 5/5          Dysmetria: None to finger-nose-finger b/l  No truncal ataxia.  Tremor: No resting, postural or action tremor.  No myoclonus.    Sensation: intact to light touch, pinprick, vibration and proprioception in the b/l upper extremities, but decreased in b/l lower extremities.    Deep Tendon Reflexes:              Biceps      BR        Patellar        Ankle       Babinski                                         R       1+           1+        1+               0             mute                                         L        1+           1+        1+               0             mute    Gait: unsteady and careful, with constant leaning on nearby objects       10-15    134<L>  |  90<L>  |  38<H>  ----------------------------<  108<H>  5.2   |  27  |  6.03<H>    Ca    9.4      15 Oct 2017 12:40    TPro  8.5<H>  /  Alb  3.5  /  TBili  0.6  /  DBili  x   /  AST  21  /  ALT  18  /  AlkPhos  174<H>  10-15                            11.5   4.96  )-----------( 138      ( 15 Oct 2017 12:40 )             37.0

## 2017-10-15 NOTE — ED PROVIDER NOTE - ATTENDING CONTRIBUTION TO CARE
Attending note:   After face to face evaluation of this patient, I concur with above noted hx, pe, and care plan for this patient. +Oxygen-dependent, dobutamine-dependent, CRF male with vomiting, nausea and dizziness this am in [employer].   Lungs clear, BP stable; evaluation in progress

## 2017-10-15 NOTE — CONSULT NOTE ADULT - ASSESSMENT
Pt is a 61 yo M with a PMH of ESRD on HD (MWF), NICM with severe LV dysfnxn (EF 19%), biotronic ICD, Seizure (off Keppra since 7/2017), L upper arm PICC with home dobutamine drip, A-Fib on coumadin, COPD on 3-4L home o2, HLD, DM, and Hypotension on midodrine p/w dizziness, lightheadedness, and feeling like passing out today. Neurology was consulted as pt also had a complaint of left sided weakness for the past 4 days, which has been causing difficulty walking. Exam is significant for effort dependant proximal weakness of the LUE and LLE, b/l LE sensation dysfunction to all modalities. Possible CVA of the right Internal Capsule causing weakness with likely sensory symptoms due to ESRD. NIHSS: 2. MRS: 3.    Recommendations:  - C/W Tele monitoring  - Neuro Checks Q4hr  - CT Head w/o  - CTA Head and CTA Neck prior to next HD  - TTE w/ bubble study  - PT/OT  - C/W Coumdain  - C/W Lipitor 20mg PO HS  - Lipid Panel and HbA1c  - Normotension  - DVT ppx Pt is a 61 yo M with a PMH of ESRD on HD (MWF), NICM with severe LV dysfnxn (EF 19%), biotronic ICD, Seizure (off Keppra since 7/2017), L upper arm PICC with home dobutamine drip, A-Fib on coumadin, COPD on 3-4L home o2, HLD, DM, and Hypotension on midodrine p/w dizziness, lightheadedness, and feeling like passing out today. Neurology was consulted as pt also had a complaint of left sided weakness for the past 4 days, which has been causing difficulty walking. Exam is significant for effort dependant proximal weakness of the LUE and LLE, b/l LE sensation dysfunction to all modalities. Possible CVA of the right Internal Capsule causing weakness with likely sensory symptoms due to ESRD. NIHSS: 2. MRS: 3.    Recommendations:  - C/W Tele monitoring  - Neuro Checks Q4hr  - CT Head w/o on 10/17  - Vascular imaging - CTA Head and CTA Neck prior to next HD OR Carotid doppler studies   - TTE w/ bubble study  - PT/OT  - C/W Coumdain  - C/W Lipitor 20mg PO HS  - Lipid Panel and HbA1c  - Normotension  - DVT ppx

## 2017-10-15 NOTE — CONSULT NOTE ADULT - SUBJECTIVE AND OBJECTIVE BOX
CHIEF COMPLAINT: Chest Pain    HPI:  62y BM hx ESRD on HD (MWF), NICM with severe LV dysfunction  (EF 19%), Biotronic ICD, Seizure (off Keppra since 7/2017), L upper arm PICC with home dobutamine drip, A Fib on Coumadin, COPD on 3-4L home o2, hld, DM, and hypotension on midodrine p/w dizziness, lightheadedness, and feeling like passing out today. Pt was sitting in the [employer], when suddenly felt dizzy, lightheaded, SOB, nauseous and felt like passing out. He vomited 2 episodes of bilious colored emesis and family called 911 to take him to the hospital. Pt also c/o of LUE and LLE numbness and weakness since Thursday (4 days). Wife reports for past 2 days when pt taken of oxygen for about 1 min pt gets slightly confused. He denies facial droop, difficulty speaking, or changes in vision. Pt's last BM was yesterday, brown colored, formed stool. Pt denies chest pain, palpitations, diaphoresis, or abdominal pain. He feels a little better.     PAST MEDICAL & SURGICAL HISTORY:  Seizure: Off Keppra since 7/2017  Chronic hypotension  AF (atrial fibrillation)  COPD (chronic obstructive pulmonary disease): 4L home O2  HLD (hyperlipidemia)  DM (diabetes mellitus): Off Insulin since 7/2017  ESRD (end stage renal disease) on dialysis  BPH (benign prostatic hypertrophy)  Myocardial infarction: 10/2011  Gout  CHF (congestive heart failure)  AICD (automatic cardioverter/defibrillator) present: Biotronic - placed 9/11/09  H/O coronary angiogram      Allergies    No Known Allergies    Intolerances      SH    FAMILY HISTORY:  No pertinent family history in first degree relatives      MEDICATIONS:  amiodarone    Tablet 200 milliGRAM(s) Oral daily  aspirin enteric coated 81 milliGRAM(s) Oral daily  atorvastatin 80 milliGRAM(s) Oral at bedtime  buDESOnide 160 MICROgram(s)/formoterol 4.5 MICROgram(s) Inhaler 2 Puff(s) Inhalation two times a day  dextrose 50% Injectable 12.5 Gram(s) IV Push once  dextrose 50% Injectable 25 Gram(s) IV Push once  dextrose 50% Injectable 25 Gram(s) IV Push once  dextrose Gel 1 Dose(s) Oral once PRN  DOBUTamine Infusion 7 MICROgram(s)/kG/Min IV Continuous <Continuous>  docusate sodium 100 milliGRAM(s) Oral daily  finasteride 5 milliGRAM(s) Oral daily  glucagon  Injectable 1 milliGRAM(s) IntraMuscular once PRN  insulin lispro (HumaLOG) corrective regimen sliding scale   SubCutaneous three times a day before meals  insulin lispro (HumaLOG) corrective regimen sliding scale   SubCutaneous at bedtime  levothyroxine 75 MICROGram(s) Oral daily  midodrine 10 milliGRAM(s) Oral every 8 hours  sevelamer hydrochloride 800 milliGRAM(s) Oral three times a day with meals      REVIEW OF SYSTEMS:    CONSTITUTIONAL: No weakness, fevers or chills  EYES/ENT: No visual changes;  No vertigo or throat pain   NECK: No pain or stiffness  RESPIRATORY: No cough, wheezing, hemoptysis; No shortness of breath  CARDIOVASCULAR: No chest pain or palpitations  GASTROINTESTINAL: No abdominal or epigastric pain. No nausea, vomiting, or hematemesis; No diarrhea or constipation. No melena or hematochezia.  GENITOURINARY: No dysuria, frequency or hematuria  NEUROLOGICAL: No numbness or weakness  SKIN: No itching, burning, rashes, or lesions   All other review of systems is negative unless indicated above    Vital Signs Last 24 Hrs  T(C): 36.5 (15 Oct 2017 18:05), Max: 36.8 (15 Oct 2017 15:28)  T(F): 97.7 (15 Oct 2017 18:05), Max: 98.2 (15 Oct 2017 15:28)  HR: 81 (15 Oct 2017 18:05) (66 - 82)  BP: 103/65 (15 Oct 2017 18:05) (96/70 - 111/56)  BP(mean): --  RR: 18 (15 Oct 2017 18:05) (16 - 22)  SpO2: 100% (15 Oct 2017 18:05) (92% - 100%)    I&O's Summary    15 Oct 2017 07:01  -  15 Oct 2017 19:38  --------------------------------------------------------  IN: 53.6 mL / OUT: 0 mL / NET: 53.6 mL        PHYSICAL EXAM:    Constitutional: NAD, awake and alert, well-developed  HEENT: PERR, EOMI  Neck: soft and supple, No LAD, No JVD  Respiratory: Breath sounds are clear bilaterally, No wheezing, rales or rhonchi  Cardiovascular: Regular rate and rhythm, normal S1 and S2,  no murmurs, gallops or rubs  Gastrointestinal: Bowel Sounds present, soft, nontender.   Extremities: No peripheral edema. No clubbing or cyanosis.  Vascular: 2+ peripheral pulses  Neurological: A/O x 3, no focal deficits  Musculoskeletal: no calf tenderness.  Skin: No rashes.      LABS: All Labs Reviewed:                        11.5   4.96  )-----------( 138      ( 15 Oct 2017 12:40 )             37.0     15 Oct 2017 12:40    134    |  90     |  38     ----------------------------<  108    5.2     |  27     |  6.03     Ca    9.4        15 Oct 2017 12:40    TPro  8.5    /  Alb  3.5    /  TBili  0.6    /  DBili  x      /  AST  21     /  ALT  18     /  AlkPhos  174    15 Oct 2017 12:40    PT/INR - ( 15 Oct 2017 12:40 )   PT: 15.9 SEC;   INR: 1.41          PTT - ( 15 Oct 2017 12:40 )  PTT:45.1 SEC  Blood Culture:     Cardiac Markers:    CKMB: 4.70 ng/mL (10-15 @ 12:40)    CKMB Relative Index: Test not performed (10-15 @ 12:40)    TSH: Thyroid Stimulating Hormone, Serum: 6.11 uIU/mL (10-15 @ 12:40)            RADIOLOGY/EKG:

## 2017-10-15 NOTE — ED ADULT NURSE NOTE - CHIEF COMPLAINT QUOTE
Patient brought to Er from [facility] by EMS. Pt has not been feeling well lately, went to [facility]. feels dizzy. Has fistula in right arm. Graft in upper right chest where he is receiving dialysis M,W,F. Pt has a dobutamine drip to PICC line upper left arm.  Wife at bedside.

## 2017-10-15 NOTE — CONSULT NOTE ADULT - SUBJECTIVE AND OBJECTIVE BOX
[facility] Consult Note Nephrology - CONSULTATION NOTE    Patient is a 62y Male with ESRD on HD MWF, CHF s/p AICD, on chronic dobutamine infusion, chronic hypotension, COPD, DM, HTN brought in by ambulance for dizziness. Pt was nauseous earlier in morning, but was able to get to [facility]. While waiting for services to begin, but c/o increasing dizziness, nausea/vomiting and malaise. Pt subsequently brought to ED for evaluation. Pt denies SOB or chest pain, Denies abd pain, F/C, or diarrhea. Overall, pt feeling better after vomiting. Renal consult for ESRD and HD management. Pt last dialyzed Friday 10/13, without any issues or acute events.     PAST MEDICAL & SURGICAL HISTORY:  Seizure: Off Keppra since 7/2017  Chronic hypotension  AF (atrial fibrillation)  COPD (chronic obstructive pulmonary disease): 4L home O2  HLD (hyperlipidemia)  DM (diabetes mellitus): Off Insulin since 7/2017  ESRD (end stage renal disease) on dialysis  BPH (benign prostatic hypertrophy)  Myocardial infarction: 10/2011  Gout  CHF (congestive heart failure)  AICD (automatic cardioverter/defibrillator) present: Biotronic - placed 9/11/09  H/O coronary angiogram    Allergies  No Known Allergies    Home Medications Reviewed  Hospital Medications:   MEDICATIONS  (STANDING):  amiodarone    Tablet 200 milliGRAM(s) Oral daily  aspirin enteric coated 81 milliGRAM(s) Oral daily  atorvastatin 80 milliGRAM(s) Oral at bedtime  buDESOnide 160 MICROgram(s)/formoterol 4.5 MICROgram(s) Inhaler 2 Puff(s) Inhalation two times a day  dextrose 50% Injectable 12.5 Gram(s) IV Push once  dextrose 50% Injectable 25 Gram(s) IV Push once  dextrose 50% Injectable 25 Gram(s) IV Push once  DOBUTamine Infusion 7.494 MICROgram(s)/kG/Min (17.85 mL/Hr) IV Continuous <Continuous>  docusate sodium 100 milliGRAM(s) Oral daily  finasteride 5 milliGRAM(s) Oral daily  insulin lispro (HumaLOG) corrective regimen sliding scale   SubCutaneous three times a day before meals  insulin lispro (HumaLOG) corrective regimen sliding scale   SubCutaneous at bedtime  levothyroxine 75 MICROGram(s) Oral daily  midodrine 10 milliGRAM(s) Oral every 8 hours  sevelamer hydrochloride 800 milliGRAM(s) Oral three times a day with meals    SOCIAL HISTORY:  Denies ETOh,Smoking,   FAMILY HISTORY:  No pertinent family history in first degree relatives    REVIEW OF SYSTEMS:  CONSTITUTIONAL: No weakness, fevers or chills  EYES/ENT: No visual changes;  No vertigo or throat pain   NECK: No pain or stiffness  RESPIRATORY: No cough, wheezing, hemoptysis; No shortness of breath  CARDIOVASCULAR: No chest pain or palpitations.  GASTROINTESTINAL: No abdominal or epigastric pain. +nausea/vomiting, No hematemesis; No diarrhea or constipation. No melena or hematochezia.  GENITOURINARY: No dysuria, frequency, foamy urine, urinary urgency, incontinence or hematuria  NEUROLOGICAL: numbness in b/l lateral aspect of legs  SKIN: No itching, burning, rashes, or lesions   VASCULAR: No bilateral lower extremity edema.   All other review of systems is negative unless indicated above.    VITALS:  T(F): 97.8 (10-15-17 @ 21:22), Max: 98.2 (10-15-17 @ 15:28)  HR: 75 (10-15-17 @ 21:22)  BP: 92/56 (10-15-17 @ 21:22)  RR: 18 (10-15-17 @ 21:22)  SpO2: 94% (10-15-17 @ 21:22)  Wt(kg): --    10-15 @ 07:01  -  10-15 @ 23:53  --------------------------------------------------------  IN: 53.6 mL / OUT: 0 mL / NET: 53.6 mL      Height (cm): 180.34 (10-15 @ 17:03)  Weight (kg): 79.4 (10-15 @ 17:03)  BMI (kg/m2): 24.4 (10-15 @ 17:03)  BSA (m2): 1.99 (10-15 @ 17:03)  PHYSICAL EXAM:  Constitutional: NAD  HEENT: anicteric sclera, oropharynx clear, MMM  Neck: No JVD  Respiratory: CTAB, no wheezes, rales or rhonchi  Cardiovascular: S1, S2, RRR  Gastrointestinal: BS+, soft, NT/ND  Extremities: No cyanosis or clubbing. No peripheral edema  Neurological: A/O x 3, no focal deficits  Psychiatric: Normal mood, normal affect  : No CVA tenderness. No rosa.   Skin: No rashes  Vascular Access: RIJ tunneled cath    LABS:  10-15    134<L>  |  90<L>  |  38<H>  ----------------------------<  108<H>  5.2   |  27  |  6.03<H>    Ca    9.4      15 Oct 2017 12:40    TPro  8.5<H>  /  Alb  3.5  /  TBili  0.6  /  DBili      /  AST  21  /  ALT  18  /  AlkPhos  174<H>  10-15    Creatinine Trend: 6.03 <--                        11.5   4.96  )-----------( 138      ( 15 Oct 2017 12:40 )             37.0     Urine Studies:      RADIOLOGY & ADDITIONAL STUDIES: [facility] Consult Note Nephrology - CONSULTATION NOTE  Pt seen earlier today.     Patient is a 62y Male with ESRD on HD MWF, CHF s/p AICD, on chronic dobutamine infusion, chronic hypotension, COPD, DM, HTN brought in by ambulance for dizziness. Pt was nauseous earlier in morning, but was able to get to [facility]. While waiting for services to begin, but c/o increasing dizziness, nausea/vomiting and malaise. Pt subsequently brought to ED for evaluation. Pt denies SOB or chest pain, Denies abd pain, F/C, or diarrhea. Overall, pt feeling better after vomiting. Renal consult for ESRD and HD management. Pt last dialyzed Friday 10/13, without any issues or acute events.     PAST MEDICAL & SURGICAL HISTORY:  Seizure: Off Keppra since 7/2017  Chronic hypotension  AF (atrial fibrillation)  COPD (chronic obstructive pulmonary disease): 4L home O2  HLD (hyperlipidemia)  DM (diabetes mellitus): Off Insulin since 7/2017  ESRD (end stage renal disease) on dialysis  BPH (benign prostatic hypertrophy)  Myocardial infarction: 10/2011  Gout  CHF (congestive heart failure)  AICD (automatic cardioverter/defibrillator) present: Biotronic - placed 9/11/09  H/O coronary angiogram    Allergies  No Known Allergies    Home Medications Reviewed  Hospital Medications:   MEDICATIONS  (STANDING):  amiodarone    Tablet 200 milliGRAM(s) Oral daily  aspirin enteric coated 81 milliGRAM(s) Oral daily  atorvastatin 80 milliGRAM(s) Oral at bedtime  buDESOnide 160 MICROgram(s)/formoterol 4.5 MICROgram(s) Inhaler 2 Puff(s) Inhalation two times a day  dextrose 50% Injectable 12.5 Gram(s) IV Push once  dextrose 50% Injectable 25 Gram(s) IV Push once  dextrose 50% Injectable 25 Gram(s) IV Push once  DOBUTamine Infusion 7.494 MICROgram(s)/kG/Min (17.85 mL/Hr) IV Continuous <Continuous>  docusate sodium 100 milliGRAM(s) Oral daily  finasteride 5 milliGRAM(s) Oral daily  insulin lispro (HumaLOG) corrective regimen sliding scale   SubCutaneous three times a day before meals  insulin lispro (HumaLOG) corrective regimen sliding scale   SubCutaneous at bedtime  levothyroxine 75 MICROGram(s) Oral daily  midodrine 10 milliGRAM(s) Oral every 8 hours  sevelamer hydrochloride 800 milliGRAM(s) Oral three times a day with meals    SOCIAL HISTORY:  Denies ETOh,Smoking,   FAMILY HISTORY:  No pertinent family history in first degree relatives    REVIEW OF SYSTEMS:  CONSTITUTIONAL: No weakness, fevers or chills  EYES/ENT: No visual changes;  No vertigo or throat pain   NECK: No pain or stiffness  RESPIRATORY: No cough, wheezing, hemoptysis; No shortness of breath  CARDIOVASCULAR: No chest pain or palpitations.  GASTROINTESTINAL: No abdominal or epigastric pain. +nausea/vomiting, No hematemesis; No diarrhea or constipation. No melena or hematochezia.  GENITOURINARY: No dysuria, frequency, foamy urine, urinary urgency, incontinence or hematuria  NEUROLOGICAL: numbness in b/l lateral aspect of legs  SKIN: No itching, burning, rashes, or lesions   VASCULAR: No bilateral lower extremity edema.   All other review of systems is negative unless indicated above.    VITALS:  T(F): 97.8 (10-15-17 @ 21:22), Max: 98.2 (10-15-17 @ 15:28)  HR: 75 (10-15-17 @ 21:22)  BP: 92/56 (10-15-17 @ 21:22)  RR: 18 (10-15-17 @ 21:22)  SpO2: 94% (10-15-17 @ 21:22)  Wt(kg): --    10-15 @ 07:01  -  10-15 @ 23:53  --------------------------------------------------------  IN: 53.6 mL / OUT: 0 mL / NET: 53.6 mL      Height (cm): 180.34 (10-15 @ 17:03)  Weight (kg): 79.4 (10-15 @ 17:03)  BMI (kg/m2): 24.4 (10-15 @ 17:03)  BSA (m2): 1.99 (10-15 @ 17:03)  PHYSICAL EXAM:  Constitutional: NAD  HEENT: anicteric sclera, oropharynx clear, MMM  Neck: No JVD  Respiratory: CTAB, no wheezes, rales or rhonchi  Cardiovascular: S1, S2, RRR  Gastrointestinal: BS+, soft, NT/ND  Extremities: No cyanosis or clubbing. No peripheral edema  Neurological: A/O x 3, no focal deficits  Psychiatric: Normal mood, normal affect  : No CVA tenderness. No rosa.   Skin: No rashes  Vascular Access: RIJ tunneled cath    LABS:  10-15    134<L>  |  90<L>  |  38<H>  ----------------------------<  108<H>  5.2   |  27  |  6.03<H>    Ca    9.4      15 Oct 2017 12:40    TPro  8.5<H>  /  Alb  3.5  /  TBili  0.6  /  DBili      /  AST  21  /  ALT  18  /  AlkPhos  174<H>  10-15    Creatinine Trend: 6.03 <--                        11.5   4.96  )-----------( 138      ( 15 Oct 2017 12:40 )             37.0     Urine Studies:      RADIOLOGY & ADDITIONAL STUDIES:

## 2017-10-15 NOTE — H&P ADULT - PROBLEM SELECTOR PLAN 2
tele monitor  CT Head w/o [facility]  House Neuro consulted  Neuro checks  PT/OT  FLP, HgA1c  continue Ecotrin  Increased Lipitor to 80mg po qhs for now until FLP returns

## 2017-10-15 NOTE — ED PROVIDER NOTE - SKIN, MLM
Skin normal color for race, warm, dry and intact. No evidence of rash. shiley in place in rt chest wall, picc line in place in left arm

## 2017-10-15 NOTE — CONSULT NOTE ADULT - ASSESSMENT
61 YO M w multiple medical problems including ESRD on HD, CHF on chronic dobutamine infusion, COPD a/w nausea, vomiting, dizziness

## 2017-10-15 NOTE — H&P ADULT - RS GEN PE MLT RESP DETAILS PC
no wheezes/breath sounds equal/good air movement/no rales/no chest wall tenderness/airway patent/respirations non-labored

## 2017-10-15 NOTE — H&P ADULT - NEGATIVE CARDIOVASCULAR SYMPTOMS
no claudication/no peripheral edema/no chest pain/no paroxysmal nocturnal dyspnea/no orthopnea/no palpitations

## 2017-10-15 NOTE — ED ADULT NURSE NOTE - OBJECTIVE STATEMENT
pt in rm 28 alert,oriented x3 . reports dizziness,n,v earlier this am. c/o nausea at present. md's to eval at present. pt arrives r [facility],last dialysis.  arrives r upper picc line [id number] in 145.6 cc d5w at 8.9 cc/hr. 4mg/cc, pharmacy at bedside to kvng. md ordering  meds at present. pt c/o purple discoloration to tubing of l picc line. family states noted discoloration this am. md at bedside aware., pt c/o numbness,weakness l leg since thurs.. pt with continuous nasal 02 infusing at 3 l,

## 2017-10-15 NOTE — H&P ADULT - PROBLEM SELECTOR PLAN 1
tele monitor   cardiac enzymes x 2  Orthostatic blood pressures  DASH 1800 ADA diet with 1200 cc Fluid restriction  continue ecotrin, midodrine.  CT Head w/o con ordered to r/o Stroke  Will consult Dr. Davis for Cardiology  Neuro checks  Will consult EP in am to interrogate ICD device

## 2017-10-15 NOTE — CHART NOTE - NSCHARTNOTEFT_GEN_A_CORE
Called by Pharmacist Yoana to clarify patient's home dose of Dobutamine.  Pt was on Dobutamine 8.9 ml/hr in 4 mg/ml concentration based on 7 mcg/kg/Min.  Case discussed with Patient's cardiologist Dr. Orozco, he recommends to continue home dose.  Dobutamine 4 mg/ml concentration is not available in the hospital, therefore Dobutamine 17.85 ml/hr in 2 mg/ml concentration is ordered which is equivalent to home dose.    Will continue to monitor.

## 2017-10-15 NOTE — CONSULT NOTE ADULT - ASSESSMENT
62y BM hx ESRD on HD (MWF), NICM with severe LV dysfunction  (EF 19%), Biotronic ICD, Seizure (off Keppra since 2017), L upper arm PICC with home dobutamine drip, A Fib on Coumadin, COPD on 3-4L home o2, hld, DM, and hypotension on midodrine p/w dizziness, lightheadedness, and feeling like passing out today.   - agree with Neuro eval  - CT r/o CVA  - continue  gtt (chronic)  - Cont Amio  - No ACE ARB ENTRESTO or BB given hypotension and  dependence and renal failure  - Renal eval and HD to optimize volume status  - stable from CV standpoint. Neuro eval in progress

## 2017-10-15 NOTE — H&P ADULT - PMH
AF (atrial fibrillation)    BPH (benign prostatic hypertrophy)    CHF (congestive heart failure)    Chronic hypotension    COPD (chronic obstructive pulmonary disease)  4L home O2  DM (diabetes mellitus)  Off Insulin since 7/2017  ESRD (end stage renal disease) on dialysis    Gout    HLD (hyperlipidemia)    Myocardial infarction  10/2011  Seizure  Off Keppra since 7/2017

## 2017-10-15 NOTE — ED PROVIDER NOTE - PROGRESS NOTE DETAILS
primary cardiologist mike paniagua is private. Answering service called at [phone]. Awaiting callback for admission. Diogenes: received callback from [facility]. Ryan consults in [facility] but does not admit. Requests admission to hospitalist service.

## 2017-10-15 NOTE — H&P ADULT - ASSESSMENT
62yM hx ESRD on HD (MWF), NICM with severe LV dysfnxn (EF 19%), biotronic ICD, Seizure (off Keppra since 7/2017), L upper arm PICC with home dobutamine drip, afib on coumadin, COPD on 3-4L home o2, hld, DM, and hypotension on midodrine p/w dizziness, lightheadedness, and feeling like passing out today and nausea w/ vomiting, found to have LUE and LLE weakness admitted to tele for Near Syncope, r/o ACS and r/o Stroke.    +Near Syncope-->Bert x2, Neuro eval, Orthostatics, ICD Interrogation  +R/O Stroke-->Neuro consult, Neuro checks, CT Head

## 2017-10-15 NOTE — H&P ADULT - PROBLEM SELECTOR PLAN 4
Monitor electrolytes  Trend BUN/Cr  Will Call nephrologist to schedule next hemodialysis Monitor electrolytes  Trend BUN/Cr  Will Call nephrologist to schedule next hemodialysis due Monday

## 2017-10-15 NOTE — CHART NOTE - NSCHARTNOTEFT_GEN_A_CORE
Evaluated patient admitted by ED. Patient is chronically very ill with ESRD on HD (MWF), NICM/HFrEF (19%) s/p biotronic AICD on chronic dobutamine gtt at home, afib on coumadin, COPD on 4L [place] O2, chronic hypotension on midodrine, T2DM, HLD presented with pre-syncope, N/V today at [facility] [place] he was about to pass out but he did not. He feels better since he came to the ED (received only zofran). Reports lightheadedness and nausea have improved. Admitted for pre-syncope monitoring. Suspect related to transient hypotension given history vs vasovagal mediated. Trops chronically elevated in ESRD (lower than prior), no EKG changes.     Dr. Solo Ridley (nephrology)  [facility] (cardiology).     Eloina Whitley MD  MAR   [id number] Evaluated patient admitted by ED. Patient is chronically very ill with ESRD on HD (MWF), NICM/HFrEF (19%) s/p biotronic AICD on chronic dobutamine gtt at home, afib on coumadin, COPD on 4L NC O2, chronic hypotension on midodrine, T2DM, HLD presented with pre-syncope, N/V today at [facility] [place] he was about to pass out but he did not. He feels better since he came to the ED (received only zofran). Reports lightheadedness and nausea have improved. Admitted for pre-syncope monitoring. Suspect related to transient hypotension given history vs vasovagal mediated. Trops chronically elevated in ESRD (lower than prior), no EKG changes. Stable for tele PA service.     Dr. Solo Ridley (nephrology)  [facility] (cardiology).     Eloina Whitley MD  MAR   [id number]

## 2017-10-15 NOTE — ED ADULT TRIAGE NOTE - CHIEF COMPLAINT QUOTE
Patient brought to [facility] from [facility] by EMS. Pt has not been feeling well lately, went to [facility]. feels dizzy. Has fistula in right arm. Graft in upper right chest where he is receiving dialysis M,W,F. Pt has a dobutamine drip to PICC line upper left arm.  Wife at bedside.

## 2017-10-15 NOTE — H&P ADULT - ATTENDING COMMENTS
Patient seen and examined care d/w tele PA.  In summary 61 yo M very co-morbid with history of prior DM2 (resolved w/ renal failure), NICM with systolic HF (EF 19%) dobutamine dependent via LUE PICC line, ESRD on HD (MWF, last HD friday; nonfunctional RUE AVF, R chest wall permcath), COPD/pulm fibrosis O2 dependent on 3L at baseline, PAF on coumadin, chronic hypotension on midodrine presenting from home for pre-syncopal episode today while at [employer].  Patient reports that he was in [facility] was sitting in [employer] when felt dizziness w/ room spinning and everything looking black, he did not pass out, this was a/w nausea and vomiting x 2 and mild SOB.  Patient denies any CP or palpitations during this event.  No abdominal pain.  Episode lasted 30 min, resolved with time, no interventions given.  Patient also reports weakness of LUE and LLE since last thursday, wife advised him to come to hospital but he refused; states used a walker to get to [employer], weakness a/w numbness and heaviness, no trouble swallowing, no dysarthria, A&Ox3.   Patient reports stroke many years ago w/ residual deficits.  Report compliance w/ coumadin.  #Pre-syncope:  given chronic hypotension possibly a hypotensive episode however given RF agree w/ r/o ACS via CE, monitor on tele for arrythmia as dobutamine can be arrhythmogenic, in am ICD interrogation  #L-side weakness concerning for CVA in context of RF:  out of window as symptoms x 4 days check CTH, neuro c/s, likely needs MRI, TTE, tele, asa/statin, PT/OT  #ESRD on HD via permcath: renal c/s needed for HD tomorrow, check hep panel for rell  #NICM/Chronic systolic HF w/ dobutamine dependence:  pharm attempting to confirm home dobutamine dosing, not on ace/bb due to chronic hypotension   #COPD/Pulm Fibrosis:  c/w 3L o2, c/w MDIs, pt states he is not on bipap at night  #PAF: c/w amio, ac on hold pending CTH to r/o bleeding, can resume ac if not for afib  #Chronic hypotension: c/w midodrine Patient seen and examined care d/w tele PA.  In summary 61 yo M very co-morbid with history of prior DM2 (resolved w/ renal failure), NICM with systolic HF (EF 19%) dobutamine dependent via LUE PICC line, ESRD on HD (MWF, last HD friday; nonfunctional RUE AVF, R chest wall permcath), COPD/pulm fibrosis O2 dependent on 3L at baseline, PAF on coumadin, chronic hypotension on midodrine presenting from home for pre-syncopal episode today while at [employer].  Patient reports that he was in [facility] was sitting in [employer] when felt dizziness w/ room spinning and everything looking black, he did not pass out, this was a/w nausea and vomiting x 2 and mild SOB.  Patient denies any CP or palpitations during this event.  No abdominal pain.  Episode lasted 30 min, resolved with time, no interventions given.  Patient also reports weakness of LUE and LLE since last thursday, wife advised him to come to hospital but he refused; states used a walker to get to [employer], weakness a/w numbness and heaviness, no trouble swallowing, no dysarthria, A&Ox3.   Patient reports stroke many years ago w/ residual deficits.  Report compliance w/ coumadin.  #Pre-syncope:  given chronic hypotension possibly a hypotensive episode however given RF agree w/ r/o ACS via CE, monitor on tele for arrythmia as dobutamine can be arrhythmogenic, in am ICD interrogation  #L-side weakness concerning for CVA in context of RF:  out of window as symptoms x 4 days check CTH, neuro c/s, likely needs MRI, TTE, tele, asa/statin, PT/OT  #ESRD on HD via permcath: renal c/s needed for HD tomorrow, check hep panel for rell  #NICM/Chronic systolic HF w/ dobutamine dependence:  pharm attempting to confirm home dobutamine dosing, not on ace/bb due to chronic hypotension   #COPD/Pulm Fibrosis:  c/w 3L o2, c/w MDIs, pt states he is not on bipap at night  #PAF: c/w amio, ac on hold pending CTH to r/o bleeding, can resume ac if not for afib  #Chronic hypotension: c/w midodrine  #DM2 w/ nephropathy/ESRD on HD/neuropathy: now on now meds due to renal failure, HbA1c 6.0% Patient seen and examined care d/w tele PA.  In summary 63 yo M very co-morbid with history of prior DM2 (resolved w/ renal failure), NICM with systolic HF (EF 19%) dobutamine dependent via LUE PICC line, ESRD on HD (MWF, last HD friday; nonfunctional RUE AVF, R chest wall permcath), COPD/pulm fibrosis O2 dependent on 3L at baseline, PAF on coumadin, chronic hypotension on midodrine presenting from home for pre-syncopal episode today while at [employer].  Patient reports that he was in [facility] was sitting in [employer] when felt dizziness w/ room spinning and everything looking black, he did not pass out, this was a/w nausea and vomiting x 2 and mild SOB.  Patient denies any CP or palpitations during this event.  No abdominal pain.  Episode lasted 30 min, resolved with time, no interventions given.  Patient also reports weakness of LUE and LLE since last thursday, wife advised him to come to hospital but he refused; states used a walker to get to [employer], weakness a/w numbness and heaviness, no trouble swallowing, no dysarthria, A&Ox3.   Patient reports stroke many years ago w/ residual deficits.  Report compliance w/ coumadin.  #Pre-syncope:  given chronic hypotension possibly a hypotensive episode however given RF agree w/ r/o ACS via CE, monitor on tele for arrythmia as dobutamine can be arrhythmogenic, in am ICD interrogation  #L-side weakness concerning for CVA in context of RF:  out of window as symptoms x 4 days check CTH, neuro c/s, no MRI due to ICD in place,  TTE, tele, asa/statin, PT/OT  #ESRD on HD via permcath: renal c/s needed for HD tomorrow, check hep panel for rell  #NICM/Chronic systolic HF w/ dobutamine dependence:  pharm attempting to confirm home dobutamine dosing, not on ace/bb due to chronic hypotension   #COPD/Pulm Fibrosis:  c/w 3L o2, c/w MDIs, pt states he is not on bipap at night  #PAF: c/w amio, ac on hold pending CTH to r/o bleeding, can resume ac if not for afib  #Chronic hypotension: c/w midodrine  #DM2 w/ nephropathy/ESRD on HD/neuropathy: now on now meds due to renal failure, HbA1c 6.0%    Patient would benefit from GOC given multiple organ dysfxn, unclear if has been addressed as OP

## 2017-10-15 NOTE — CONSULT NOTE ADULT - PROBLEM SELECTOR RECOMMENDATION 9
Maintenance HD schedule MWF.   PLan for HD tomorrow.   Not grossly volume overloaded at this time, and electrolytes acceptable.

## 2017-10-15 NOTE — H&P ADULT - PROBLEM SELECTOR PLAN 3
tele monitor  cardiac enzymes x 2 to R/O ACS.  serial EKGs  2G Sodium 1800 ADA diet with 1200 cc Fluid restriction  Strict I&Os plus Daily weights.  Continue home dobutamine drip, restarted in ED  Will call Dr. Davis for cardiology consult

## 2017-10-16 DIAGNOSIS — N18.6 END STAGE RENAL DISEASE: ICD-10-CM

## 2017-10-16 DIAGNOSIS — N18.9 CHRONIC KIDNEY DISEASE, UNSPECIFIED: ICD-10-CM

## 2017-10-16 DIAGNOSIS — I50.9 HEART FAILURE, UNSPECIFIED: ICD-10-CM

## 2017-10-16 DIAGNOSIS — I95.9 HYPOTENSION, UNSPECIFIED: ICD-10-CM

## 2017-10-16 LAB
APTT BLD: 44.1 SEC — HIGH (ref 27.5–37.4)
BUN SERPL-MCNC: 44 MG/DL — HIGH (ref 7–23)
CALCIUM SERPL-MCNC: 8.9 MG/DL — SIGNIFICANT CHANGE UP (ref 8.4–10.5)
CHLORIDE SERPL-SCNC: 91 MMOL/L — LOW (ref 98–107)
CHOLEST SERPL-MCNC: 93 MG/DL — LOW (ref 120–199)
CO2 SERPL-SCNC: 26 MMOL/L — SIGNIFICANT CHANGE UP (ref 22–31)
CREAT SERPL-MCNC: 6.84 MG/DL — HIGH (ref 0.5–1.3)
GLUCOSE BLDC GLUCOMTR-MCNC: 125 MG/DL — HIGH (ref 70–99)
GLUCOSE SERPL-MCNC: 129 MG/DL — HIGH (ref 70–99)
HBV CORE AB SER-ACNC: NONREACTIVE — SIGNIFICANT CHANGE UP
HBV SURFACE AB SER-ACNC: <3 MLU/ML — LOW
HBV SURFACE AG SER-ACNC: NEGATIVE — SIGNIFICANT CHANGE UP
HCT VFR BLD CALC: 32.4 % — LOW (ref 39–50)
HCV AB S/CO SERPL IA: 0.15 S/CO — SIGNIFICANT CHANGE UP
HCV AB SERPL-IMP: SIGNIFICANT CHANGE UP
HDLC SERPL-MCNC: 52 MG/DL — SIGNIFICANT CHANGE UP (ref 35–55)
HGB BLD-MCNC: 10 G/DL — LOW (ref 13–17)
INR BLD: 1.41 — HIGH (ref 0.88–1.17)
LIPID PNL WITH DIRECT LDL SERPL: 30 MG/DL — SIGNIFICANT CHANGE UP
MAGNESIUM SERPL-MCNC: 2.2 MG/DL — SIGNIFICANT CHANGE UP (ref 1.6–2.6)
MCHC RBC-ENTMCNC: 28.9 PG — SIGNIFICANT CHANGE UP (ref 27–34)
MCHC RBC-ENTMCNC: 30.9 % — LOW (ref 32–36)
MCV RBC AUTO: 93.6 FL — SIGNIFICANT CHANGE UP (ref 80–100)
NRBC # FLD: 0 — SIGNIFICANT CHANGE UP
PHOSPHATE SERPL-MCNC: 4.8 MG/DL — HIGH (ref 2.5–4.5)
PLATELET # BLD AUTO: 126 K/UL — LOW (ref 150–400)
PMV BLD: 12.1 FL — SIGNIFICANT CHANGE UP (ref 7–13)
POTASSIUM SERPL-MCNC: 5.4 MMOL/L — HIGH (ref 3.5–5.3)
POTASSIUM SERPL-SCNC: 5.4 MMOL/L — HIGH (ref 3.5–5.3)
PROTHROM AB SERPL-ACNC: 15.9 SEC — HIGH (ref 9.8–13.1)
RBC # BLD: 3.46 M/UL — LOW (ref 4.2–5.8)
RBC # FLD: 18.1 % — HIGH (ref 10.3–14.5)
SODIUM SERPL-SCNC: 133 MMOL/L — LOW (ref 135–145)
T4 FREE SERPL-MCNC: 1.02 NG/DL — SIGNIFICANT CHANGE UP (ref 0.9–1.8)
TRIGL SERPL-MCNC: 63 MG/DL — SIGNIFICANT CHANGE UP (ref 10–149)
WBC # BLD: 5.81 K/UL — SIGNIFICANT CHANGE UP (ref 3.8–10.5)
WBC # FLD AUTO: 5.81 K/UL — SIGNIFICANT CHANGE UP (ref 3.8–10.5)

## 2017-10-16 PROCEDURE — 70496 CT ANGIOGRAPHY HEAD: CPT | Mod: 26

## 2017-10-16 PROCEDURE — 70498 CT ANGIOGRAPHY NECK: CPT | Mod: 26,52

## 2017-10-16 PROCEDURE — 99233 SBSQ HOSP IP/OBS HIGH 50: CPT

## 2017-10-16 RX ORDER — WARFARIN SODIUM 2.5 MG/1
3 TABLET ORAL ONCE
Qty: 0 | Refills: 0 | Status: COMPLETED | OUTPATIENT
Start: 2017-10-16 | End: 2017-10-16

## 2017-10-16 RX ORDER — ERYTHROPOIETIN 10000 [IU]/ML
8000 INJECTION, SOLUTION INTRAVENOUS; SUBCUTANEOUS
Qty: 0 | Refills: 0 | Status: DISCONTINUED | OUTPATIENT
Start: 2017-10-16 | End: 2017-10-19

## 2017-10-16 RX ADMIN — MIDODRINE HYDROCHLORIDE 10 MILLIGRAM(S): 2.5 TABLET ORAL at 13:28

## 2017-10-16 RX ADMIN — MIDODRINE HYDROCHLORIDE 10 MILLIGRAM(S): 2.5 TABLET ORAL at 05:28

## 2017-10-16 RX ADMIN — BUDESONIDE AND FORMOTEROL FUMARATE DIHYDRATE 2 PUFF(S): 160; 4.5 AEROSOL RESPIRATORY (INHALATION) at 09:28

## 2017-10-16 RX ADMIN — SEVELAMER CARBONATE 800 MILLIGRAM(S): 2400 POWDER, FOR SUSPENSION ORAL at 20:58

## 2017-10-16 RX ADMIN — Medication 1: at 13:26

## 2017-10-16 RX ADMIN — ATORVASTATIN CALCIUM 80 MILLIGRAM(S): 80 TABLET, FILM COATED ORAL at 21:00

## 2017-10-16 RX ADMIN — AMIODARONE HYDROCHLORIDE 200 MILLIGRAM(S): 400 TABLET ORAL at 05:28

## 2017-10-16 RX ADMIN — Medication 17.85 MICROGRAM(S)/KG/MIN: at 05:29

## 2017-10-16 RX ADMIN — Medication 81 MILLIGRAM(S): at 12:45

## 2017-10-16 RX ADMIN — Medication 17.85 MICROGRAM(S)/KG/MIN: at 09:29

## 2017-10-16 RX ADMIN — SEVELAMER CARBONATE 800 MILLIGRAM(S): 2400 POWDER, FOR SUSPENSION ORAL at 09:28

## 2017-10-16 RX ADMIN — BUDESONIDE AND FORMOTEROL FUMARATE DIHYDRATE 2 PUFF(S): 160; 4.5 AEROSOL RESPIRATORY (INHALATION) at 21:00

## 2017-10-16 RX ADMIN — SEVELAMER CARBONATE 800 MILLIGRAM(S): 2400 POWDER, FOR SUSPENSION ORAL at 12:45

## 2017-10-16 RX ADMIN — Medication 100 MILLIGRAM(S): at 12:45

## 2017-10-16 RX ADMIN — FINASTERIDE 5 MILLIGRAM(S): 5 TABLET, FILM COATED ORAL at 12:45

## 2017-10-16 RX ADMIN — Medication 75 MICROGRAM(S): at 05:28

## 2017-10-16 RX ADMIN — ERYTHROPOIETIN 8000 UNIT(S): 10000 INJECTION, SOLUTION INTRAVENOUS; SUBCUTANEOUS at 16:29

## 2017-10-16 RX ADMIN — MIDODRINE HYDROCHLORIDE 10 MILLIGRAM(S): 2.5 TABLET ORAL at 21:00

## 2017-10-16 RX ADMIN — Medication 17.85 MICROGRAM(S)/KG/MIN: at 20:58

## 2017-10-16 RX ADMIN — WARFARIN SODIUM 3 MILLIGRAM(S): 2.5 TABLET ORAL at 20:58

## 2017-10-16 NOTE — OCCUPATIONAL THERAPY INITIAL EVALUATION ADULT - PRECAUTIONS/LIMITATIONS, REHAB EVAL
O2 3L, EF 19%/oxygen therapy device and L/min cardiac precautions/O2 3L, EF 19%,/oxygen therapy device and L/min

## 2017-10-16 NOTE — PROGRESS NOTE ADULT - SUBJECTIVE AND OBJECTIVE BOX
[facility] - Mark / Khai PADRON /Jennifer/ VITO García/ VITO Leigh/ Kolton Farr / LISSA Njeru  ---------------------------------------------------------------------------------------------------------------    Patient seen and examined bedside    Subjective and Objective: No overnight events. + sob, chronic, stable, on NC02. No complaints today. dizzyness resolved    Allergies: No Known Allergies      Hospital Medications:   MEDICATIONS  (STANDING):  amiodarone    Tablet 200 milliGRAM(s) Oral daily  aspirin enteric coated 81 milliGRAM(s) Oral daily  atorvastatin 80 milliGRAM(s) Oral at bedtime  buDESOnide 160 MICROgram(s)/formoterol 4.5 MICROgram(s) Inhaler 2 Puff(s) Inhalation two times a day  dextrose 50% Injectable 12.5 Gram(s) IV Push once  dextrose 50% Injectable 25 Gram(s) IV Push once  dextrose 50% Injectable 25 Gram(s) IV Push once  DOBUTamine Infusion 7.494 MICROgram(s)/kG/Min (17.85 mL/Hr) IV Continuous <Continuous>  docusate sodium 100 milliGRAM(s) Oral daily  finasteride 5 milliGRAM(s) Oral daily  insulin lispro (HumaLOG) corrective regimen sliding scale   SubCutaneous three times a day before meals  insulin lispro (HumaLOG) corrective regimen sliding scale   SubCutaneous at bedtime  levothyroxine 75 MICROGram(s) Oral daily  midodrine 10 milliGRAM(s) Oral every 8 hours  sevelamer hydrochloride 800 milliGRAM(s) Oral three times a day with meals  warfarin 3 milliGRAM(s) Oral once      VITALS:  T(F): 98.3 (10-16-17 @ 09:30), Max: 98.3 (10-16-17 @ 05:25)  HR: 72 (10-16-17 @ 11:17)  BP: 103/62 (10-16-17 @ 09:30)  RR: 18 (10-16-17 @ 11:17)  SpO2: 96% (10-16-17 @ 11:17)  Wt(kg): --    10-15 @ 07:01  -  10-16 @ 07:00  --------------------------------------------------------  IN: 53.6 mL / OUT: 0 mL / NET: 53.6 mL    10-16 @ 07:01  -  10-16 @ 14:38  --------------------------------------------------------  IN: 504.6 mL / OUT: 0 mL / NET: 504.6 mL      Height (cm): 180.34 (10-15 @ 17:03)  Weight (kg): 79.4 (10-15 @ 17:03)  BMI (kg/m2): 24.4 (10-15 @ 17:03)  BSA (m2): 1.99 (10-15 @ 17:03)    PHYSICAL EXAM:  Constitutional: NAD  HEENT: anicteric sclera, oropharynx clear  Neck: No JVD  Respiratory: dec bibasilar BS, no wheezes, rales or rhonchi  Cardiovascular: S1, S2, RRR  Gastrointestinal: BS+, soft, NT/ND  Extremities: No cyanosis or clubbing. trace peripheral edema  Neurological: A/O x 3, no focal deficits  Psychiatric: Normal mood, normal affect  : No CVA tenderness. No rosa.   Skin: No rashes  Vascular Access: IJ PC    LABS:  10-16    133<L>  |  91<L>  |  44<H>  ----------------------------<  129<H>  5.4<H>   |  26  |  6.84<H>    Ca    8.9      16 Oct 2017 05:44  Phos  4.8     10-16  Mg     2.2     10-16    TPro  8.5<H>  /  Alb  3.5  /  TBili  0.6  /  DBili      /  AST  21  /  ALT  18  /  AlkPhos  174<H>  10-15    Creatinine Trend: 6.84 <--, 6.03 <--                        10.0   5.81  )-----------( 126      ( 16 Oct 2017 05:44 )             32.4     Urine Studies:        RADIOLOGY & ADDITIONAL STUDIES: [facility] - Mark / Khai PADRON /Jennifer/ VITO García/ VITO Leigh/ Kolton Farr / LISSA Njeru  ---------------------------------------------------------------------------------------------------------------    Patient seen and examined bedside, earlier today    Subjective and Objective: No overnight events. + sob, chronic, stable, on NC02. No complaints today. dizzyness resolved    Allergies: No Known Allergies      Hospital Medications:   MEDICATIONS  (STANDING):  amiodarone    Tablet 200 milliGRAM(s) Oral daily  aspirin enteric coated 81 milliGRAM(s) Oral daily  atorvastatin 80 milliGRAM(s) Oral at bedtime  buDESOnide 160 MICROgram(s)/formoterol 4.5 MICROgram(s) Inhaler 2 Puff(s) Inhalation two times a day  dextrose 50% Injectable 12.5 Gram(s) IV Push once  dextrose 50% Injectable 25 Gram(s) IV Push once  dextrose 50% Injectable 25 Gram(s) IV Push once  DOBUTamine Infusion 7.494 MICROgram(s)/kG/Min (17.85 mL/Hr) IV Continuous <Continuous>  docusate sodium 100 milliGRAM(s) Oral daily  finasteride 5 milliGRAM(s) Oral daily  insulin lispro (HumaLOG) corrective regimen sliding scale   SubCutaneous three times a day before meals  insulin lispro (HumaLOG) corrective regimen sliding scale   SubCutaneous at bedtime  levothyroxine 75 MICROGram(s) Oral daily  midodrine 10 milliGRAM(s) Oral every 8 hours  sevelamer hydrochloride 800 milliGRAM(s) Oral three times a day with meals  warfarin 3 milliGRAM(s) Oral once      VITALS:  T(F): 98.3 (10-16-17 @ 09:30), Max: 98.3 (10-16-17 @ 05:25)  HR: 72 (10-16-17 @ 11:17)  BP: 103/62 (10-16-17 @ 09:30)  RR: 18 (10-16-17 @ 11:17)  SpO2: 96% (10-16-17 @ 11:17)  Wt(kg): --    10-15 @ 07:01  -  10-16 @ 07:00  --------------------------------------------------------  IN: 53.6 mL / OUT: 0 mL / NET: 53.6 mL    10-16 @ 07:01  -  10-16 @ 14:38  --------------------------------------------------------  IN: 504.6 mL / OUT: 0 mL / NET: 504.6 mL      Height (cm): 180.34 (10-15 @ 17:03)  Weight (kg): 79.4 (10-15 @ 17:03)  BMI (kg/m2): 24.4 (10-15 @ 17:03)  BSA (m2): 1.99 (10-15 @ 17:03)    PHYSICAL EXAM:  Constitutional: NAD  HEENT: anicteric sclera, oropharynx clear  Neck: No JVD  Respiratory: dec bibasilar BS, no wheezes, rales or rhonchi  Cardiovascular: S1, S2, RRR  Gastrointestinal: BS+, soft, NT/ND  Extremities: No cyanosis or clubbing. trace peripheral edema  Neurological: A/O x 3, no focal deficits  Psychiatric: Normal mood, normal affect  : No CVA tenderness. No rosa.   Skin: No rashes  Vascular Access: IJ PC    LABS:  10-16    133<L>  |  91<L>  |  44<H>  ----------------------------<  129<H>  5.4<H>   |  26  |  6.84<H>    Ca    8.9      16 Oct 2017 05:44  Phos  4.8     10-16  Mg     2.2     10-16    TPro  8.5<H>  /  Alb  3.5  /  TBili  0.6  /  DBili      /  AST  21  /  ALT  18  /  AlkPhos  174<H>  10-15    Creatinine Trend: 6.84 <--, 6.03 <--                        10.0   5.81  )-----------( 126      ( 16 Oct 2017 05:44 )             32.4     Urine Studies:        RADIOLOGY & ADDITIONAL STUDIES:

## 2017-10-16 NOTE — PROGRESS NOTE ADULT - ASSESSMENT
62yM hx ESRD on HD (MWF), NICM with severe LV dysfnxn (EF 19%), biotronic ICD, Seizure (off Keppra since 7/2017), L upper arm PICC with home dobutamine drip, afib on coumadin, COPD on 3-4L home o2, hld, DM, and hypotension on midodrine p/w near syncopal episode w/ associated nausea w/ vomiting and LUE and LLE weakness admitted to tele for  r/o ACS and r/o Stroke.     +Near Syncope-->Bert x2, Neuro eval, Orthostatics, ICD Interrogation  +R/O Stroke-->Neuro consult, Neuro checks, CT Head

## 2017-10-16 NOTE — DIETITIAN INITIAL EVALUATION ADULT. - DIET TYPE
caffeine free/renal replacement pts:no protein restr,no conc K & phos, low sodium/low sodium/1200ml/consistent carbohydrate (no snacks)/regular/DASH/TLC (sodium and cholesterol restricted diet)

## 2017-10-16 NOTE — DIETITIAN INITIAL EVALUATION ADULT. - PROBLEM SELECTOR PLAN 2
tele monitor  CT Head w/o [facility]  House Neuro consulted  Neuro checks  PT/OT  FLP, HgA1c  continue Ecotrin  Increased Lipitor to 80mg po qhs for now until FLP returns

## 2017-10-16 NOTE — OCCUPATIONAL THERAPY INITIAL EVALUATION ADULT - RANGE OF MOTION EXAMINATION, UPPER EXTREMITY
left sh. 0-70 flex active/Left UE Active Assistive ROM was WFL  (within functional limits)/Right UE Active ROM was WFL (within functional limits)

## 2017-10-16 NOTE — DIETITIAN INITIAL EVALUATION ADULT. - NS AS NUTRI INTERV MEALS SNACK
1. Suggest: PO diet rx: Regular, Consistent Carbohydrate (evening snack), Renal Replacement (no prot restr, no conc K, no conc phos, low sodium); PO supplement: Nepro 1 can x 2 daily (provides additional ~850 Kcal, ~38 gm Protein); Fluid restriction per MD discretion;             2. Encourage & assist Pt with meals; Monitor PO diet tolerance;          3. Monitor labs, weights, hydration status;             4. Recommend: Nephro-lynda 1 cap daily;/Other (specify)/Diets modified for specific foods and ingredients

## 2017-10-16 NOTE — PROGRESS NOTE ADULT - PROBLEM SELECTOR PLAN 5
CHADS2 score=3  -c/w amio   -coumadin was held yesterday initially pending CT head results, would resume home coumadin of 3mg tonight  -monitor INR, INR subtherapeutic today

## 2017-10-16 NOTE — OCCUPATIONAL THERAPY INITIAL EVALUATION ADULT - PERTINENT HX OF CURRENT PROBLEM, REHAB EVAL
62yM hx ESRD on HD (MWF), NICM with severe LV dysfnxn (EF 19%), biotronic ICD, Seizure (off Keppra since 7/2017), L upper arm PICC with home dobutamine drip, afib on coumadin, COPD on 3-4L home o2, hld, DM, and hypotension on midodrine p/w dizziness, lightheadedness, and feeling like passing out today and nausea w/ vomiting, found to have LUE and LLE weakness admitted to tele for Near Syncope, r/o ACS and r/o Stroke.

## 2017-10-16 NOTE — OCCUPATIONAL THERAPY INITIAL EVALUATION ADULT - PLANNED THERAPY INTERVENTIONS, OT EVAL
ADL retraining/balance training/ROM/parent/caregiver training.../strengthening/transfer training/neuromuscular re-education/bed mobility training

## 2017-10-16 NOTE — PROGRESS NOTE ADULT - ASSESSMENT
ESRD on HD MWF  chronic sev CMP/volume overload  anemia    maintainance hd today w/2k bath, uf 1.5-2kg as tolerated  on Dobutamine drip  add epogen 8k tiw w/hd 62y Male with ESRD on HD MWF, sev CMP EF 19%, s/p AICD, on chronic dobutamine infusion, chronic hypotension, COPD, DM, HTN p/w dizziness. Renal following for HD    ESRD on HD MWF- clinically hypervolemic. K mildly high  chronic sev CMP/volume overload  anemia in CKD-Hb trending down  Near-syncope-f/u neuro, cardio  Hyperphosphatemia-phos at goal. c/w renagel

## 2017-10-16 NOTE — DIETITIAN INITIAL EVALUATION ADULT. - OTHER INFO
Nutrition Consult X Dialysis. Pt 61 yo male with ESRD on Dialysis. Pt appears alert, oriented. Per Pt his appetite not that good for past several months. Pt also stated he lost weight: ~60# in 6 months 2/2 suboptimal food intake related to his sickness. RDN offered PO supplement: Jama, Pt agreed to try. Renal diet discussed with Pt briefly (as Pt was about to go for dialysis); written materials on diet left @ bed side for Pt to review. No chew/swallow problem voiced; no nausea/vomiting/diarrhea reported @ present. RDN remains available, Pt made aware. Nutrition Consult X Dialysis. Pt 63 yo male with ESRD on Dialysis. Pt appears alert, oriented. Per Pt his appetite not that good for past several months. Pt also stated he lost weight: ~60# in 6 months 2/2 suboptimal food intake related to his sickness. RDN offered PO supplement: Jama, Pt agreed to try. Renal diet discussed with Pt briefly (as Pt was about to leave the unit for dialysis); written materials on diet left @ bed side for Pt to review. No chew/swallow problem voiced; no nausea/vomiting/diarrhea reported @ present. RDN remains available, Pt made aware.

## 2017-10-16 NOTE — CHART NOTE - NSCHARTNOTEFT_GEN_A_CORE
NUTRITION SERVICES     Upon Nutritional Assessment by the Registered Dietitian your patient was determined to meet criteria/ has evidence of the following diagnosis/diagnoses:  [ ] Mild Protein Calorie Malnutrition   [ ] Moderate Protein Calorie Malnutrition   [X] Severe Protein Calorie Malnutrition   [ ] Unspecified Protein Calorie Malnutrition   [ ] Underweight / BMI <19  [ ] Morbid Obesity / BMI >40    Findings as based on:  •  Comprehensive nutrition assessment and consultation     Treatment:  The following diet has been recommended:

## 2017-10-16 NOTE — DIETITIAN INITIAL EVALUATION ADULT. - PERTINENT LABORATORY DATA
(10/16) H/H 10.0/32.4 L,  L, Na 133 L, K 5.4 H, Cl 91 L, BUN 44 H, Creat 6.84 H, Glu 129 (10/16) H/H 10.0/32.4 L,  L, Na 133 L, K 5.4 H, Cl 91 L, BUN 44 H, Creat 6.84 H, Glu 129 H, Cholesterol 93 L;       (10/15) Albumin 3.5,  H, HbA1c 6.0% H

## 2017-10-16 NOTE — PROGRESS NOTE ADULT - PROBLEM SELECTOR PLAN 6
-Continuous SpO2 monitoring  -continue Symbicort, PRN short acting bronchodilators if needed   -c/w O2 supplementation

## 2017-10-16 NOTE — CHART NOTE - NSCHARTNOTEFT_GEN_A_CORE
ELECTROPHYSIOLOGY  Device Interrogation Performed                                  Date/Time:  10/16/2017 13:29  :           [email or web address]  single chamber ICD              Model:                Itervia                    Mode:           VVI                  Rate:     50        Ventricular Lead(s):  RV Lead: R wave amplitude:           17.5               mv          Impedence:        477           Ohms      Threshold:           0.5     V@       0.4     ms       Battery Status:                     Good                   Underlying Rhythm:    Sinus rhythm at 70    Events/Observation: No high ventricular rate events;  No high atrial rate events     Impression/Plan: p/w near syncope; no correlated events  Normal sensing and pacing via iterative testing.  Excellent threshold capture.  No reprogramming.

## 2017-10-16 NOTE — PROGRESS NOTE ADULT - SUBJECTIVE AND OBJECTIVE BOX
Patient is a 62y old  Male who presents with a chief complaint of "feeling like passing out" (15 Oct 2017 17:03)      SUBJECTIVE / OVERNIGHT EVENTS: No events ON. Pt denies dizziness, SOB, CP. States that he still feels some weakness in the L arm and LLE.     Review of Systems:     MEDICATIONS  (STANDING):  amiodarone    Tablet 200 milliGRAM(s) Oral daily  aspirin enteric coated 81 milliGRAM(s) Oral daily  atorvastatin 80 milliGRAM(s) Oral at bedtime  buDESOnide 160 MICROgram(s)/formoterol 4.5 MICROgram(s) Inhaler 2 Puff(s) Inhalation two times a day  dextrose 50% Injectable 12.5 Gram(s) IV Push once  dextrose 50% Injectable 25 Gram(s) IV Push once  dextrose 50% Injectable 25 Gram(s) IV Push once  DOBUTamine Infusion 7.494 MICROgram(s)/kG/Min (17.85 mL/Hr) IV Continuous <Continuous>  docusate sodium 100 milliGRAM(s) Oral daily  finasteride 5 milliGRAM(s) Oral daily  insulin lispro (HumaLOG) corrective regimen sliding scale   SubCutaneous three times a day before meals  insulin lispro (HumaLOG) corrective regimen sliding scale   SubCutaneous at bedtime  levothyroxine 75 MICROGram(s) Oral daily  midodrine 10 milliGRAM(s) Oral every 8 hours  sevelamer hydrochloride 800 milliGRAM(s) Oral three times a day with meals    MEDICATIONS  (PRN):  dextrose Gel 1 Dose(s) Oral once PRN Blood Glucose LESS THAN 70 milliGRAM(s)/deciliter  glucagon  Injectable 1 milliGRAM(s) IntraMuscular once PRN Glucose LESS THAN 70 milligrams/deciliter      PHYSICAL EXAM:    I&O's Summary    15 Oct 2017 07:01  -  16 Oct 2017 07:00  --------------------------------------------------------  IN: 53.6 mL / OUT: 0 mL / NET: 53.6 mL    16 Oct 2017 07:01  -  16 Oct 2017 13:13  --------------------------------------------------------  IN: 306.8 mL / OUT: 0 mL / NET: 306.8 mL  Vital Signs Last 24 Hrs  T(C): 36.8 (16 Oct 2017 09:30), Max: 36.8 (15 Oct 2017 15:28)  T(F): 98.3 (16 Oct 2017 09:30), Max: 98.3 (16 Oct 2017 05:25)  HR: 72 (16 Oct 2017 11:17) (70 - 82)  BP: 103/62 (16 Oct 2017 09:30) (92/56 - 114/65)  BP(mean): --  RR: 18 (16 Oct 2017 11:17) (16 - 22)  SpO2: 96% (16 Oct 2017 11:17) (94% - 100%)  PHYSICAL EXAM:  Constitutional: NAD  Eyes: PERRL, EOMI, no scleral icterus or injection  ENMT: supple, no lymphadenopathy, no palpable mass  Respiratory: CTA b/l , no wheezing, or ronchi  Cardiovascular: RRR, +S1/S2, no murmur appreciated , no LE edema   Gastrointestinal: Soft, NT/ND, +BS   Extremities: Warm and well perfused   Vascular: +DP/PT pulses   Neurological: AX0x3, CN II-XII intact,  Stregnth 5+ in upper and lower e  Skin: unremarkable   Psychiatric: Normal mood and affect           LABS:  CAPILLARY BLOOD GLUCOSE      POCT Blood Glucose.: 176 mg/dL (16 Oct 2017 13:08)  POCT Blood Glucose.: 125 mg/dL (16 Oct 2017 08:52)  POCT Blood Glucose.: 187 mg/dL (15 Oct 2017 21:57)  POCT Blood Glucose.: 88 mg/dL (15 Oct 2017 18:16)                          10.0   5.81  )-----------( 126      ( 16 Oct 2017 05:44 )             32.4     10-16    133<L>  |  91<L>  |  44<H>  ----------------------------<  129<H>  5.4<H>   |  26  |  6.84<H>    Ca    8.9      16 Oct 2017 05:44  Phos  4.8     10-16  Mg     2.2     10-16    TPro  8.5<H>  /  Alb  3.5  /  TBili  0.6  /  DBili  x   /  AST  21  /  ALT  18  /  AlkPhos  174<H>  10-15    PT/INR - ( 16 Oct 2017 05:44 )   PT: 15.9 SEC;   INR: 1.41          PTT - ( 16 Oct 2017 05:44 )  PTT:44.1 SEC  CARDIAC MARKERS ( 15 Oct 2017 19:00 )  x     / 0.35 ng/mL / 75 u/L / 4.73 ng/mL / x      CARDIAC MARKERS ( 15 Oct 2017 12:40 )  x     / 0.36 ng/mL / 77 u/L / 4.70 ng/mL / x              RADIOLOGY & ADDITIONAL TESTS:    Imaging Personally Reviewed:    Consultant(s) Notes Reviewed:      Care Discussed with Consultants/Other Providers:

## 2017-10-17 ENCOUNTER — TRANSCRIPTION ENCOUNTER (OUTPATIENT)
Age: 62
End: 2017-10-17

## 2017-10-17 LAB
BUN SERPL-MCNC: 28 MG/DL — HIGH (ref 7–23)
CALCIUM SERPL-MCNC: 8.8 MG/DL — SIGNIFICANT CHANGE UP (ref 8.4–10.5)
CHLORIDE SERPL-SCNC: 92 MMOL/L — LOW (ref 98–107)
CO2 SERPL-SCNC: 27 MMOL/L — SIGNIFICANT CHANGE UP (ref 22–31)
CREAT SERPL-MCNC: 4.9 MG/DL — HIGH (ref 0.5–1.3)
GLUCOSE SERPL-MCNC: 134 MG/DL — HIGH (ref 70–99)
HCT VFR BLD CALC: 34.2 % — LOW (ref 39–50)
HGB BLD-MCNC: 10.5 G/DL — LOW (ref 13–17)
INR BLD: 1.37 — HIGH (ref 0.88–1.17)
MCHC RBC-ENTMCNC: 29.2 PG — SIGNIFICANT CHANGE UP (ref 27–34)
MCHC RBC-ENTMCNC: 30.7 % — LOW (ref 32–36)
MCV RBC AUTO: 95 FL — SIGNIFICANT CHANGE UP (ref 80–100)
NRBC # FLD: 0 — SIGNIFICANT CHANGE UP
PLATELET # BLD AUTO: 131 K/UL — LOW (ref 150–400)
PMV BLD: 11.6 FL — SIGNIFICANT CHANGE UP (ref 7–13)
POTASSIUM SERPL-MCNC: 4.8 MMOL/L — SIGNIFICANT CHANGE UP (ref 3.5–5.3)
POTASSIUM SERPL-SCNC: 4.8 MMOL/L — SIGNIFICANT CHANGE UP (ref 3.5–5.3)
PROTHROM AB SERPL-ACNC: 15.4 SEC — HIGH (ref 9.8–13.1)
RBC # BLD: 3.6 M/UL — LOW (ref 4.2–5.8)
RBC # FLD: 18.4 % — HIGH (ref 10.3–14.5)
SODIUM SERPL-SCNC: 131 MMOL/L — LOW (ref 135–145)
WBC # BLD: 6.79 K/UL — SIGNIFICANT CHANGE UP (ref 3.8–10.5)
WBC # FLD AUTO: 6.79 K/UL — SIGNIFICANT CHANGE UP (ref 3.8–10.5)

## 2017-10-17 PROCEDURE — 93282 PRGRMG EVAL IMPLANTABLE DFB: CPT | Mod: 26

## 2017-10-17 PROCEDURE — 99233 SBSQ HOSP IP/OBS HIGH 50: CPT

## 2017-10-17 RX ORDER — SENNA PLUS 8.6 MG/1
2 TABLET ORAL AT BEDTIME
Qty: 0 | Refills: 0 | Status: DISCONTINUED | OUTPATIENT
Start: 2017-10-17 | End: 2017-10-19

## 2017-10-17 RX ORDER — POLYETHYLENE GLYCOL 3350 17 G/17G
17 POWDER, FOR SOLUTION ORAL DAILY
Qty: 0 | Refills: 0 | Status: DISCONTINUED | OUTPATIENT
Start: 2017-10-17 | End: 2017-10-19

## 2017-10-17 RX ORDER — WARFARIN SODIUM 2.5 MG/1
5 TABLET ORAL ONCE
Qty: 0 | Refills: 0 | Status: COMPLETED | OUTPATIENT
Start: 2017-10-17 | End: 2017-10-17

## 2017-10-17 RX ADMIN — SENNA PLUS 2 TABLET(S): 8.6 TABLET ORAL at 22:23

## 2017-10-17 RX ADMIN — SEVELAMER CARBONATE 800 MILLIGRAM(S): 2400 POWDER, FOR SUSPENSION ORAL at 12:41

## 2017-10-17 RX ADMIN — SEVELAMER CARBONATE 800 MILLIGRAM(S): 2400 POWDER, FOR SUSPENSION ORAL at 17:10

## 2017-10-17 RX ADMIN — MIDODRINE HYDROCHLORIDE 10 MILLIGRAM(S): 2.5 TABLET ORAL at 22:23

## 2017-10-17 RX ADMIN — ATORVASTATIN CALCIUM 80 MILLIGRAM(S): 80 TABLET, FILM COATED ORAL at 22:23

## 2017-10-17 RX ADMIN — AMIODARONE HYDROCHLORIDE 200 MILLIGRAM(S): 400 TABLET ORAL at 05:38

## 2017-10-17 RX ADMIN — Medication 81 MILLIGRAM(S): at 12:41

## 2017-10-17 RX ADMIN — Medication 75 MICROGRAM(S): at 05:38

## 2017-10-17 RX ADMIN — POLYETHYLENE GLYCOL 3350 17 GRAM(S): 17 POWDER, FOR SOLUTION ORAL at 12:43

## 2017-10-17 RX ADMIN — Medication 17.85 MICROGRAM(S)/KG/MIN: at 17:10

## 2017-10-17 RX ADMIN — MIDODRINE HYDROCHLORIDE 10 MILLIGRAM(S): 2.5 TABLET ORAL at 14:44

## 2017-10-17 RX ADMIN — BUDESONIDE AND FORMOTEROL FUMARATE DIHYDRATE 2 PUFF(S): 160; 4.5 AEROSOL RESPIRATORY (INHALATION) at 22:23

## 2017-10-17 RX ADMIN — Medication 17.85 MICROGRAM(S)/KG/MIN: at 12:47

## 2017-10-17 RX ADMIN — FINASTERIDE 5 MILLIGRAM(S): 5 TABLET, FILM COATED ORAL at 12:41

## 2017-10-17 RX ADMIN — Medication 100 MILLIGRAM(S): at 12:41

## 2017-10-17 RX ADMIN — SEVELAMER CARBONATE 800 MILLIGRAM(S): 2400 POWDER, FOR SUSPENSION ORAL at 09:11

## 2017-10-17 RX ADMIN — WARFARIN SODIUM 5 MILLIGRAM(S): 2.5 TABLET ORAL at 17:10

## 2017-10-17 RX ADMIN — Medication 1: at 12:45

## 2017-10-17 RX ADMIN — BUDESONIDE AND FORMOTEROL FUMARATE DIHYDRATE 2 PUFF(S): 160; 4.5 AEROSOL RESPIRATORY (INHALATION) at 09:11

## 2017-10-17 RX ADMIN — MIDODRINE HYDROCHLORIDE 10 MILLIGRAM(S): 2.5 TABLET ORAL at 05:38

## 2017-10-17 NOTE — PROGRESS NOTE ADULT - PROBLEM SELECTOR PLAN 5
CHADS2 score=3  -c/w amio   -continue to dose coumadin Pt states at home he takes alternating days of 3mg and 1mg. Howver, INR subtherapeutic today. Would dose coumadin 5mg today  -monitor INR

## 2017-10-17 NOTE — PROGRESS NOTE ADULT - ASSESSMENT
62yM hx ESRD on HD (MWF), NICM with severe LV dysfnxn (EF 19% 8/25/17), biotronic ICD, Seizure (off Keppra since 7/2017), L upper arm PICC with home dobutamine drip, afib on coumadin, COPD on 3-4L home o2, hld, DM, and hypotension on midodrine p/w near syncopal episode w/ associated nausea w/ vomiting and LUE and LLE weakness admitted to tele for  r/o ACS and r/o Stroke.

## 2017-10-17 NOTE — PROGRESS NOTE ADULT - SUBJECTIVE AND OBJECTIVE BOX
Patient is a 62y old  Male who presents with a chief complaint of "feeling like passing out" (15 Oct 2017 17:03)      SUBJECTIVE / OVERNIGHT EVENTS: Patient reports continued mild stable weakness on the L. Stable SOB not getting worse.     MEDICATIONS  (STANDING):  amiodarone    Tablet 200 milliGRAM(s) Oral daily  aspirin enteric coated 81 milliGRAM(s) Oral daily  atorvastatin 80 milliGRAM(s) Oral at bedtime  buDESOnide 160 MICROgram(s)/formoterol 4.5 MICROgram(s) Inhaler 2 Puff(s) Inhalation two times a day  dextrose 50% Injectable 12.5 Gram(s) IV Push once  dextrose 50% Injectable 25 Gram(s) IV Push once  dextrose 50% Injectable 25 Gram(s) IV Push once  DOBUTamine Infusion 7.494 MICROgram(s)/kG/Min (17.85 mL/Hr) IV Continuous <Continuous>  docusate sodium 100 milliGRAM(s) Oral daily  epoetin alba Injectable 8000 Unit(s) IV Push <User Schedule>  finasteride 5 milliGRAM(s) Oral daily  insulin lispro (HumaLOG) corrective regimen sliding scale   SubCutaneous three times a day before meals  insulin lispro (HumaLOG) corrective regimen sliding scale   SubCutaneous at bedtime  levothyroxine 75 MICROGram(s) Oral daily  midodrine 10 milliGRAM(s) Oral every 8 hours  senna 2 Tablet(s) Oral at bedtime  sevelamer hydrochloride 800 milliGRAM(s) Oral three times a day with meals  warfarin 5 milliGRAM(s) Oral once    MEDICATIONS  (PRN):  dextrose Gel 1 Dose(s) Oral once PRN Blood Glucose LESS THAN 70 milliGRAM(s)/deciliter  glucagon  Injectable 1 milliGRAM(s) IntraMuscular once PRN Glucose LESS THAN 70 milligrams/deciliter  polyethylene glycol 3350 17 Gram(s) Oral daily PRN Constipation      PHYSICAL EXAM:    I&O's Summary    16 Oct 2017 07:01  -  17 Oct 2017 07:00  --------------------------------------------------------  IN: 1222.4 mL / OUT: 2400 mL / NET: -1177.6 mL    17 Oct 2017 07:01  -  17 Oct 2017 12:38  --------------------------------------------------------  IN: 653.4 mL / OUT: 0 mL / NET: 653.4 mL    Vital Signs Last 24 Hrs  T(C): 36.7 (17 Oct 2017 09:00), Max: 36.7 (16 Oct 2017 13:30)  T(F): 98 (17 Oct 2017 09:00), Max: 98.1 (17 Oct 2017 05:00)  HR: 84 (17 Oct 2017 09:50) (72 - 84)  BP: 103/54 (17 Oct 2017 09:00) (90/49 - 112/64)  BP(mean): --  RR: 16 (17 Oct 2017 09:00) (16 - 18)  SpO2: 99% (17 Oct 2017 09:00) (97% - 99%)  PHYSICAL EXAM:  Constitutional: NAD  Eyes: PERRL, EOMI, no scleral icterus or injection  ENMT: supple, no lymphadenopathy, no palpable mass  Respiratory: CTA b/l , no wheezing, or ronchi  Cardiovascular: RRR, +S1/S2, no murmur appreciated , no LE edema   Gastrointestinal: Soft, NT/ND, +BS   Extremities: Warm and well perfused   Vascular: +DP/PT pulses   Neurological: AX0x3, CN II-XII intact,  Stregnth 5+ in upper and lower e  Skin: unremarkable   Psychiatric: Normal mood and affect       LABS:  CAPILLARY BLOOD GLUCOSE      POCT Blood Glucose.: 109 mg/dL (17 Oct 2017 09:00)  POCT Blood Glucose.: 104 mg/dL (16 Oct 2017 21:47)  POCT Blood Glucose.: 105 mg/dL (16 Oct 2017 17:11)  POCT Blood Glucose.: 176 mg/dL (16 Oct 2017 13:08)                          10.5   6.79  )-----------( 131      ( 17 Oct 2017 07:20 )             34.2     10-17    131<L>  |  92<L>  |  28<H>  ----------------------------<  134<H>  4.8   |  27  |  4.90<H>    Ca    8.8      17 Oct 2017 07:20  Phos  4.8     10-16  Mg     2.2     10-16    TPro  8.5<H>  /  Alb  3.5  /  TBili  0.6  /  DBili  x   /  AST  21  /  ALT  18  /  AlkPhos  174<H>  10-15    PT/INR - ( 17 Oct 2017 07:20 )   PT: 15.4 SEC;   INR: 1.37          PTT - ( 16 Oct 2017 05:44 )  PTT:44.1 SEC  CARDIAC MARKERS ( 15 Oct 2017 19:00 )  x     / 0.35 ng/mL / 75 u/L / 4.73 ng/mL / x      CARDIAC MARKERS ( 15 Oct 2017 12:40 )  x     / 0.36 ng/mL / 77 u/L / 4.70 ng/mL / x              RADIOLOGY & ADDITIONAL TESTS:    Imaging Personally Reviewed:    Consultant(s) Notes Reviewed:      Care Discussed with Consultants/Other Providers:

## 2017-10-17 NOTE — PROGRESS NOTE ADULT - ASSESSMENT
62y Male with ESRD on HD MWF, sev CMP EF 19%, s/p AICD, on chronic dobutamine infusion, chronic hypotension, COPD, DM, HTN p/w dizziness. Renal following for HD    ESRD on HD MWF- clinically hypervolemic. K mildly high  chronic sev CMP/volume overload  anemia in CKD-Hb trending down  Near-syncope-f/u neuro, cardio  Hyperphosphatemia-phos at goal. c/w renagel

## 2017-10-17 NOTE — PROGRESS NOTE ADULT - SUBJECTIVE AND OBJECTIVE BOX
Pt seen and examined at bedside  No new complaints. DEnies SOB or pain, or dizziness.     Allergies:  No Known Allergies    Hospital Medications:   MEDICATIONS  (STANDING):  amiodarone    Tablet 200 milliGRAM(s) Oral daily  aspirin enteric coated 81 milliGRAM(s) Oral daily  atorvastatin 80 milliGRAM(s) Oral at bedtime  buDESOnide 160 MICROgram(s)/formoterol 4.5 MICROgram(s) Inhaler 2 Puff(s) Inhalation two times a day  dextrose 50% Injectable 12.5 Gram(s) IV Push once  dextrose 50% Injectable 25 Gram(s) IV Push once  dextrose 50% Injectable 25 Gram(s) IV Push once  DOBUTamine Infusion 7.494 MICROgram(s)/kG/Min (17.85 mL/Hr) IV Continuous <Continuous>  docusate sodium 100 milliGRAM(s) Oral daily  epoetin alba Injectable 8000 Unit(s) IV Push <User Schedule>  finasteride 5 milliGRAM(s) Oral daily  insulin lispro (HumaLOG) corrective regimen sliding scale   SubCutaneous three times a day before meals  insulin lispro (HumaLOG) corrective regimen sliding scale   SubCutaneous at bedtime  levothyroxine 75 MICROGram(s) Oral daily  midodrine 10 milliGRAM(s) Oral every 8 hours  senna 2 Tablet(s) Oral at bedtime  sevelamer hydrochloride 800 milliGRAM(s) Oral three times a day with meals  warfarin 5 milliGRAM(s) Oral once    VITALS:  T(F): 98 (10-17-17 @ 09:00), Max: 98.1 (10-17-17 @ 05:00)  HR: 83 (10-17-17 @ 12:38)  BP: 103/53 (10-17-17 @ 12:38)  RR: 16 (10-17-17 @ 12:38)  SpO2: 99% (10-17-17 @ 12:38)  Wt(kg): --    10-16 @ 07:01  -  10-17 @ 07:00  --------------------------------------------------------  IN: 1222.4 mL / OUT: 2400 mL / NET: -1177.6 mL    10-17 @ 07:01  -  10-17 @ 12:52  --------------------------------------------------------  IN: 653.4 mL / OUT: 0 mL / NET: 653.4 mL    PHYSICAL EXAM:  Constitutional: NAD  HEENT: anicteric sclera, oropharynx clear, MMM  Neck: No JVD  Respiratory: CTAB, no wheezes, rales or rhonchi  Cardiovascular: S1, S2, RRR  Gastrointestinal: BS+, soft, NT/ND  Extremities: No cyanosis or clubbing. No peripheral edema  Neurological: A/O x 3, no focal deficits  Psychiatric: Normal mood, normal affect  : No CVA tenderness. No rosa.   Skin: No rashes  Vascular Access: RIJ tunneled cath     LABS:  10-17    131<L>  |  92<L>  |  28<H>  ----------------------------<  134<H>  4.8   |  27  |  4.90<H>    Ca    8.8      17 Oct 2017 07:20  Phos  4.8     10-16  Mg     2.2     10-16      Creatinine Trend: 4.90 <--, 6.84 <--, 6.03 <--                        10.5   6.79  )-----------( 131      ( 17 Oct 2017 07:20 )             34.2

## 2017-10-18 LAB
BUN SERPL-MCNC: 46 MG/DL — HIGH (ref 7–23)
CALCIUM SERPL-MCNC: 8.5 MG/DL — SIGNIFICANT CHANGE UP (ref 8.4–10.5)
CHLORIDE SERPL-SCNC: 90 MMOL/L — LOW (ref 98–107)
CO2 SERPL-SCNC: 24 MMOL/L — SIGNIFICANT CHANGE UP (ref 22–31)
CREAT SERPL-MCNC: 6.11 MG/DL — HIGH (ref 0.5–1.3)
GLUCOSE SERPL-MCNC: 176 MG/DL — HIGH (ref 70–99)
HCT VFR BLD CALC: 32.8 % — LOW (ref 39–50)
HGB BLD-MCNC: 10.2 G/DL — LOW (ref 13–17)
INR BLD: 1.49 — HIGH (ref 0.88–1.17)
MCHC RBC-ENTMCNC: 29.1 PG — SIGNIFICANT CHANGE UP (ref 27–34)
MCHC RBC-ENTMCNC: 31.1 % — LOW (ref 32–36)
MCV RBC AUTO: 93.4 FL — SIGNIFICANT CHANGE UP (ref 80–100)
NRBC # FLD: 0 — SIGNIFICANT CHANGE UP
PLATELET # BLD AUTO: 125 K/UL — LOW (ref 150–400)
PMV BLD: 12.2 FL — SIGNIFICANT CHANGE UP (ref 7–13)
POTASSIUM SERPL-MCNC: 4.7 MMOL/L — SIGNIFICANT CHANGE UP (ref 3.5–5.3)
POTASSIUM SERPL-SCNC: 4.7 MMOL/L — SIGNIFICANT CHANGE UP (ref 3.5–5.3)
PROTHROM AB SERPL-ACNC: 16.8 SEC — HIGH (ref 9.8–13.1)
RBC # BLD: 3.51 M/UL — LOW (ref 4.2–5.8)
RBC # FLD: 18.5 % — HIGH (ref 10.3–14.5)
SODIUM SERPL-SCNC: 129 MMOL/L — LOW (ref 135–145)
WBC # BLD: 5.72 K/UL — SIGNIFICANT CHANGE UP (ref 3.8–10.5)
WBC # FLD AUTO: 5.72 K/UL — SIGNIFICANT CHANGE UP (ref 3.8–10.5)

## 2017-10-18 PROCEDURE — 99233 SBSQ HOSP IP/OBS HIGH 50: CPT

## 2017-10-18 PROCEDURE — 93306 TTE W/DOPPLER COMPLETE: CPT | Mod: 26

## 2017-10-18 RX ORDER — ATORVASTATIN CALCIUM 80 MG/1
1 TABLET, FILM COATED ORAL
Qty: 0 | Refills: 0 | COMMUNITY
Start: 2017-10-18

## 2017-10-18 RX ORDER — DOBUTAMINE HCL 250MG/20ML
1 VIAL (ML) INTRAVENOUS
Qty: 1 | Refills: 0 | OUTPATIENT
Start: 2017-10-18 | End: 2017-11-17

## 2017-10-18 RX ORDER — WARFARIN SODIUM 2.5 MG/1
1 TABLET ORAL
Qty: 14 | Refills: 0 | OUTPATIENT
Start: 2017-10-18 | End: 2017-11-01

## 2017-10-18 RX ORDER — WARFARIN SODIUM 2.5 MG/1
5 TABLET ORAL ONCE
Qty: 0 | Refills: 0 | Status: COMPLETED | OUTPATIENT
Start: 2017-10-18 | End: 2017-10-18

## 2017-10-18 RX ORDER — ATORVASTATIN CALCIUM 80 MG/1
1 TABLET, FILM COATED ORAL
Qty: 0 | Refills: 0 | COMMUNITY

## 2017-10-18 RX ORDER — WARFARIN SODIUM 2.5 MG/1
1 TABLET ORAL
Qty: 0 | Refills: 0 | COMMUNITY

## 2017-10-18 RX ORDER — WARFARIN SODIUM 2.5 MG/1
5 TABLET ORAL ONCE
Qty: 0 | Refills: 0 | Status: DISCONTINUED | OUTPATIENT
Start: 2017-10-18 | End: 2017-10-18

## 2017-10-18 RX ADMIN — BUDESONIDE AND FORMOTEROL FUMARATE DIHYDRATE 2 PUFF(S): 160; 4.5 AEROSOL RESPIRATORY (INHALATION) at 10:57

## 2017-10-18 RX ADMIN — WARFARIN SODIUM 5 MILLIGRAM(S): 2.5 TABLET ORAL at 17:01

## 2017-10-18 RX ADMIN — SEVELAMER CARBONATE 800 MILLIGRAM(S): 2400 POWDER, FOR SUSPENSION ORAL at 17:01

## 2017-10-18 RX ADMIN — BUDESONIDE AND FORMOTEROL FUMARATE DIHYDRATE 2 PUFF(S): 160; 4.5 AEROSOL RESPIRATORY (INHALATION) at 21:55

## 2017-10-18 RX ADMIN — Medication 1: at 18:05

## 2017-10-18 RX ADMIN — FINASTERIDE 5 MILLIGRAM(S): 5 TABLET, FILM COATED ORAL at 17:01

## 2017-10-18 RX ADMIN — ATORVASTATIN CALCIUM 80 MILLIGRAM(S): 80 TABLET, FILM COATED ORAL at 21:55

## 2017-10-18 RX ADMIN — SEVELAMER CARBONATE 800 MILLIGRAM(S): 2400 POWDER, FOR SUSPENSION ORAL at 10:57

## 2017-10-18 RX ADMIN — Medication 17.85 MICROGRAM(S)/KG/MIN: at 21:59

## 2017-10-18 RX ADMIN — AMIODARONE HYDROCHLORIDE 200 MILLIGRAM(S): 400 TABLET ORAL at 05:03

## 2017-10-18 RX ADMIN — Medication 75 MICROGRAM(S): at 05:03

## 2017-10-18 RX ADMIN — SENNA PLUS 2 TABLET(S): 8.6 TABLET ORAL at 21:55

## 2017-10-18 RX ADMIN — SEVELAMER CARBONATE 800 MILLIGRAM(S): 2400 POWDER, FOR SUSPENSION ORAL at 14:01

## 2017-10-18 RX ADMIN — Medication 100 MILLIGRAM(S): at 17:01

## 2017-10-18 RX ADMIN — Medication 81 MILLIGRAM(S): at 17:01

## 2017-10-18 RX ADMIN — MIDODRINE HYDROCHLORIDE 10 MILLIGRAM(S): 2.5 TABLET ORAL at 05:03

## 2017-10-18 RX ADMIN — ERYTHROPOIETIN 8000 UNIT(S): 10000 INJECTION, SOLUTION INTRAVENOUS; SUBCUTANEOUS at 13:17

## 2017-10-18 RX ADMIN — Medication 1: at 13:45

## 2017-10-18 RX ADMIN — MIDODRINE HYDROCHLORIDE 10 MILLIGRAM(S): 2.5 TABLET ORAL at 21:55

## 2017-10-18 NOTE — DISCHARGE NOTE ADULT - CARE PROVIDER_API CALL
Martin Alexander (SEAMUS), Neurology; Vascular Neurology  [address] [facility] [address]  [place] [address]  Phone: [phone]  Fax: [phone]    Thaddeus Davis), Cardiovascular Disease; Internal Medicine  [address] [facility] [address]  [place], [place] [id number]  Phone: [phone]  Fax: [phone] Martin Alexander (SEAMUS), Neurology; Vascular Neurology  [address] [facility] [address]  [place] [address]  Phone: [phone]  Fax: [phone]    Thaddeus Davis), Cardiovascular Disease; Internal Medicine  [address] [facility] [address]  [place], [place] [id number]  Phone: [phone]  Fax: [phone]

## 2017-10-18 NOTE — DISCHARGE NOTE ADULT - CARE PLAN
Principal Discharge DX:	Pre-syncope  Goal:	prevent future occurrences  Instructions for follow-up, activity and diet:	Keep hydrated to prevent dehydration. Sit or lie down right away if feeling dizzy or faint, move slowly and let yourself get used to one position before you move to another position, move your legs often if you must sit or  one position for a long time. Avoid exercise outside on a hot day. Follow up with primary care provider in 1 week.  Secondary Diagnosis:	Chronic systolic CHF (congestive heart failure)  Instructions for follow-up, activity and diet:	follow up with your cardiologist in 1-2 weeks. Continue medications as prescribed. Continue low sodium diet. monitor fluid intake, monitor weight every morning. Monitor blood pressure. Do not smoke. Please inform your doctors with any new or worsening symptoms such as shortness of breath or leg swelling.  Secondary Diagnosis:	AF (atrial fibrillation)  Instructions for follow-up, activity and diet:	Please continue to take your medications as prescribed, Low salt, low fat diet, exercise as tolerated. Do not smoke, limit alcohol and caffeine. Monitor Blood pressure and pulse. Follow up with your cardiologist in 1 week.  Secondary Diagnosis:	Chronic hypotension  Instructions for follow-up, activity and diet:	Continue midodrine pre-dialysis  Secondary Diagnosis:	COPD (chronic obstructive pulmonary disease)  Instructions for follow-up, activity and diet:	Continue current medications as prescribed.  Secondary Diagnosis:	ESRD (end stage renal disease)  Instructions for follow-up, activity and diet:	Please follow up with your nephrologist to monitor your kidney function, continue with low protein diet, and continue routine hemodialysis.  Secondary Diagnosis:	HLD (hyperlipidemia)  Instructions for follow-up, activity and diet:	Continue with medications as prescribed. Decrease the total number of fat you eat, include fish in your diet, eat foods with high fiber. Limit alcohol and do not smoke. Maintain a healthy weight and exercise regularly. Follow up with primary care provider in 1 week. Principal Discharge DX:	Pre-syncope  Goal:	prevent future occurrences  Instructions for follow-up, activity and diet:	Keep hydrated to prevent dehydration however remember fluid restrictions. Sit or lie down right away if feeling dizzy or faint, move slowly and let yourself get used to one position before you move to another position, move your legs often if you must sit or  one position for a long time. Avoid exercise outside on a hot day. Follow up with primary care provider in 1 week.  Secondary Diagnosis:	Chronic systolic CHF (congestive heart failure)  Goal:	prevent further occurrences  Instructions for follow-up, activity and diet:	follow up with your cardiologist in 1-2 weeks. Continue medications as prescribed. Continue low sodium diet. monitor fluid intake, monitor weight every morning. Monitor blood pressure. Do not smoke. Please inform your doctors with any new or worsening symptoms such as shortness of breath or leg swelling.  Secondary Diagnosis:	AF (atrial fibrillation)  Goal:	Pt will have improvement in arrhythmia  Instructions for follow-up, activity and diet:	Please continue to take your medications as prescribed, Low salt, low fat diet, exercise as tolerated. Do not smoke, limit alcohol and caffeine. Monitor Blood pressure and pulse. Follow up with your cardiologist in 1 week.  Secondary Diagnosis:	Chronic hypotension  Goal:	Pt will not have further episodes of hypotension  Instructions for follow-up, activity and diet:	Continue midodrine pre-dialysis as prescribed  Secondary Diagnosis:	COPD (chronic obstructive pulmonary disease)  Goal:	Pt will have no decrease in pulmonary function  Instructions for follow-up, activity and diet:	Continue current medications as prescribed.  Secondary Diagnosis:	ESRD (end stage renal disease)  Goal:	pt renal disease will remain stable  Instructions for follow-up, activity and diet:	Please follow up with your nephrologist to monitor your kidney function, continue with low protein diet, and continue routine hemodialysis.  Secondary Diagnosis:	HLD (hyperlipidemia)  Goal:	pt cholesterol will remain in normal limits  Instructions for follow-up, activity and diet:	Continue with medications as prescribed. Decrease the total number of fat you eat, include fish in your diet, eat foods with high fiber. Limit alcohol and do not smoke. Maintain a healthy weight and exercise regularly. Follow up with primary care provider in 1 week. Principal Discharge DX:	Pre-syncope  Goal:	prevent future occurrences  Instructions for follow-up, activity and diet:	Keep hydrated to prevent dehydration however remember fluid restrictions. Sit or lie down right away if feeling dizzy or faint, move slowly and let yourself get used to one position before you move to another position, move your legs often if you must sit or  one position for a long time. Avoid exercise outside on a hot day. Follow up with primary care provider in 1 week.  Secondary Diagnosis:	Chronic systolic CHF (congestive heart failure)  Goal:	prevent exacerbations  Instructions for follow-up, activity and diet:	follow up with your cardiologist with in 1-2weeks. Continue medications as prescribed. Continue low sodium diet. monitor fluid intake, monitor weight every morning. Monitor blood pressure. Do not smoke. Please inform your doctors with any new or worsening symptoms such as shortness of breath or leg swelling.  Secondary Diagnosis:	AF (atrial fibrillation)  Instructions for follow-up, activity and diet:	Please continue to take your medications as prescribed. Exercise as tolerated. Do not smoke, limit alcohol and caffeine. Monitor Blood pressure and pulse. Follow up with your cardiologist in 1 week. You will need to check your INR tomorrow at HD. You were provided with a script.  Secondary Diagnosis:	Chronic hypotension  Goal:	To prevent further episodes of hypotension  Instructions for follow-up, activity and diet:	Continue midodrine pre-dialysis as prescribed  Secondary Diagnosis:	COPD (chronic obstructive pulmonary disease)  Goal:	To prevent further decrease in pulmonary function  Instructions for follow-up, activity and diet:	Continue current medications as prescribed.  Secondary Diagnosis:	ESRD (end stage renal disease)  Goal:	pt renal disease will remain stable  Instructions for follow-up, activity and diet:	Please follow up with your nephrologist to monitor your kidney function, continue with low protein diet, and continue routine hemodialysis.  Secondary Diagnosis:	HLD (hyperlipidemia)  Goal:	pt cholesterol will remain in normal limits  Instructions for follow-up, activity and diet:	Continue with medications as prescribed. Decrease the total number of fat you eat, include fish in your diet, eat foods with high fiber. Limit alcohol and do not smoke. Maintain a healthy weight and exercise regularly. Follow up with primary care provider in 1 week.

## 2017-10-18 NOTE — PROGRESS NOTE ADULT - SUBJECTIVE AND OBJECTIVE BOX
Patient is a 62y old  Male who presents with a chief complaint of "feeling like passing out"  and L sided weakness       SUBJECTIVE / OVERNIGHT EVENTS: No events overnight. Pt has baseline SOB which is not getting worse.     Review of Systems:     MEDICATIONS  (STANDING):  amiodarone    Tablet 200 milliGRAM(s) Oral daily  aspirin enteric coated 81 milliGRAM(s) Oral daily  atorvastatin 80 milliGRAM(s) Oral at bedtime  buDESOnide 160 MICROgram(s)/formoterol 4.5 MICROgram(s) Inhaler 2 Puff(s) Inhalation two times a day  dextrose 50% Injectable 12.5 Gram(s) IV Push once  dextrose 50% Injectable 25 Gram(s) IV Push once  dextrose 50% Injectable 25 Gram(s) IV Push once  DOBUTamine Infusion 7.494 MICROgram(s)/kG/Min (17.85 mL/Hr) IV Continuous <Continuous>  docusate sodium 100 milliGRAM(s) Oral daily  epoetin alba Injectable 8000 Unit(s) IV Push <User Schedule>  finasteride 5 milliGRAM(s) Oral daily  insulin lispro (HumaLOG) corrective regimen sliding scale   SubCutaneous three times a day before meals  insulin lispro (HumaLOG) corrective regimen sliding scale   SubCutaneous at bedtime  levothyroxine 75 MICROGram(s) Oral daily  midodrine 10 milliGRAM(s) Oral every 8 hours  senna 2 Tablet(s) Oral at bedtime  sevelamer hydrochloride 800 milliGRAM(s) Oral three times a day with meals    MEDICATIONS  (PRN):  dextrose Gel 1 Dose(s) Oral once PRN Blood Glucose LESS THAN 70 milliGRAM(s)/deciliter  glucagon  Injectable 1 milliGRAM(s) IntraMuscular once PRN Glucose LESS THAN 70 milligrams/deciliter  polyethylene glycol 3350 17 Gram(s) Oral daily PRN Constipation      PHYSICAL EXAM:    I&O's Summary    17 Oct 2017 07:01  -  18 Oct 2017 07:00  --------------------------------------------------------  IN: 1173.6 mL / OUT: 0 mL / NET: 1173.6 mL      Vital Signs Last 24 Hrs  T(C): 36 (18 Oct 2017 11:00), Max: 36.9 (17 Oct 2017 17:07)  T(F): 96.8 (18 Oct 2017 11:00), Max: 98.4 (17 Oct 2017 17:07)  HR: 78 (18 Oct 2017 11:00) (73 - 80)  BP: 104/51 (18 Oct 2017 11:00) (93/53 - 115/61)  BP(mean): --  RR: 7 (18 Oct 2017 11:00) (7 - 16)  SpO2: 98% (18 Oct 2017 05:01) (95% - 100%)    PHYSICAL EXAM:  Constitutional: NAD  HEENT: anicteric sclera, oropharynx clear, MMM  Neck: No JVD  Respiratory: CTAB, no wheezes, rales or rhonchi  Cardiovascular: S1, S2, RRR  Gastrointestinal: BS+, soft, NT/ND  Extremities: No cyanosis or clubbing. No peripheral edema  Neurological: A/O x 3, no focal deficits  Psychiatric: Normal mood, normal affect  : No CVA tenderness. No rosa.   Skin: No rashes  Vascular Access: RIJ tunneled cath     LABS:  CAPILLARY BLOOD GLUCOSE      POCT Blood Glucose.: 162 mg/dL (18 Oct 2017 13:01)  POCT Blood Glucose.: 150 mg/dL (18 Oct 2017 09:02)  POCT Blood Glucose.: 135 mg/dL (17 Oct 2017 21:35)  POCT Blood Glucose.: 105 mg/dL (17 Oct 2017 17:27)                          10.2   5.72  )-----------( 125      ( 18 Oct 2017 11:15 )             32.8     10-18    129<L>  |  90<L>  |  46<H>  ----------------------------<  176<H>  4.7   |  24  |  6.11<H>    Ca    8.5      18 Oct 2017 11:15      PT/INR - ( 18 Oct 2017 11:15 )   PT: 16.8 SEC;   INR: 1.49        Imaging Personally Reviewed:   EXAM:  CT ANGIO BRAIN (W)AW IC      EXAM:  CT ANGIO NECK (W)AW IC        PROCEDURE DATE:  Oct 16 2017         INTERPRETATION:  CT ANGIO NECK (W)AW IC, CT ANGIO BRAIN (W)AW IC    CLINICAL INDICATION: Weakness    TECHNIQUE : Precontrast CT scan of the head and arterial phase imaging of   the head and neck was performed. 90 cc of Omnipaque 350 was administered   and 10 cc were discarded. Axial, coronal, sagittal and MIP   reconstructions were made.    COMPARISON: CT head performed 6/15/2017 and 10/15/2020 and CT abdomen   performed 6/21/2017    FINDINGS:   CTA head:  There is no aneurysm. The anterior communicating artery, bilateral   anterior cerebral arteries, middle cerebral arteries, posterior cerebral   arteries and basilar artery are normal in course and caliber. The   posterior communicating arteries are hypoplastic.    No intracranial hemorrhage, mass effect or midline shift.    Age-related sulcal symmetric sulcal dilatation and exvacuodilatation of   the ventricles. Chronic microvascular ischemic changes in the white   matter.    CTA neck:  A right IJ line, left PICC line and left pacemaker are partially   visualized.    The common carotid arteries, internal carotid arteries and vertebral   arteries are normal in course and caliber. The vertebral arteries and are   symmetric and both contribute to the basilar artery. There is no stenosis   by NASCET criteria.    The left common carotid and the brachiocephalic arteries share a common   trunk.    Diffuse traction bronchiectasis and groundglass opacities are partially   visualized and unchanged.    IMPRESSION:  CTA brain: No aneurysm, stenosis or vessel occlusion.    CTA neck: No stenosis by NASCET criteria.

## 2017-10-18 NOTE — DISCHARGE NOTE ADULT - MEDICATION SUMMARY - MEDICATIONS TO TAKE
I will START or STAY ON the medications listed below when I get home from the hospital:    dobutamine infusion   -- 7mcg/kg/min  -- Indication: For CHF (congestive heart failure)    finasteride 5 mg oral tablet  -- 1 tab(s) by mouth once a day  -- Indication: For BPH    Ecotrin Adult Low Strength 81 mg oral delayed release tablet  -- 1 tab(s) by mouth once a day  -- Indication: For Preventative    amiodarone 200 mg oral tablet  -- 1 tab(s) by mouth once a day  -- Indication: For AF (atrial fibrillation)    Coumadin 5 mg oral tablet  -- 1 tab(s) by mouth once a day (at bedtime) as directed  -- Do not take this drug if you are pregnant.  It is very important that you take or use this exactly as directed.  Do not skip doses or discontinue unless directed by your doctor.  Obtain medical advice before taking any non-prescription drugs as some may affect the action of this medication.    -- Indication: For AF (atrial fibrillation)    atorvastatin 80 mg oral tablet  -- 1 tab(s) by mouth once a day (at bedtime)  -- Indication: For HLD (hyperlipidemia)    budesonide-formoterol 160 mcg-4.5 mcg/inh inhalation aerosol  -- 2 puff(s) inhaled 2 times a day  -- Indication: For COPD (chronic obstructive pulmonary disease)    docusate sodium 100 mg oral capsule  -- 1 cap(s) by mouth once a day  -- Indication: For COnstipation    midodrine 10 mg oral tablet  -- 3 tab(s) by mouth every 8 hours  -- Indication: For Hypotension    Renvela 800 mg oral tablet  -- 1 tab(s) by mouth 3 times a day (with meals)  -- Indication: For ESRD (end stage renal disease) on dialysis    Synthroid 75 mcg (0.075 mg) oral tablet  -- 1 tab(s) by mouth once a day  -- Indication: For Hypothyroid

## 2017-10-18 NOTE — PROGRESS NOTE ADULT - SUBJECTIVE AND OBJECTIVE BOX
Pt seen and examined during HD  No acute events overnight.  Pt comfortable. Denies SOB, N/V, F/C.   Feeling at baseline now.    Allergies:  No Known Allergies    Hospital Medications:   MEDICATIONS  (STANDING):  amiodarone    Tablet 200 milliGRAM(s) Oral daily  aspirin enteric coated 81 milliGRAM(s) Oral daily  atorvastatin 80 milliGRAM(s) Oral at bedtime  buDESOnide 160 MICROgram(s)/formoterol 4.5 MICROgram(s) Inhaler 2 Puff(s) Inhalation two times a day  dextrose 50% Injectable 12.5 Gram(s) IV Push once  dextrose 50% Injectable 25 Gram(s) IV Push once  dextrose 50% Injectable 25 Gram(s) IV Push once  DOBUTamine Infusion 7.494 MICROgram(s)/kG/Min (17.85 mL/Hr) IV Continuous <Continuous>  docusate sodium 100 milliGRAM(s) Oral daily  epoetin alba Injectable 8000 Unit(s) IV Push <User Schedule>  finasteride 5 milliGRAM(s) Oral daily  insulin lispro (HumaLOG) corrective regimen sliding scale   SubCutaneous three times a day before meals  insulin lispro (HumaLOG) corrective regimen sliding scale   SubCutaneous at bedtime  levothyroxine 75 MICROGram(s) Oral daily  midodrine 10 milliGRAM(s) Oral every 8 hours  senna 2 Tablet(s) Oral at bedtime  sevelamer hydrochloride 800 milliGRAM(s) Oral three times a day with meals      VITALS:  T(F): 96.8 (10-18-17 @ 11:00), Max: 98.4 (10-17-17 @ 17:07)  HR: 78 (10-18-17 @ 11:00)  BP: 104/51 (10-18-17 @ 11:00)  RR: 7 (10-18-17 @ 11:00)  SpO2: 98% (10-18-17 @ 05:01)  Wt(kg): --    10-17 @ 07:01  -  10-18 @ 07:00  --------------------------------------------------------  IN: 1173.6 mL / OUT: 0 mL / NET: 1173.6 mL    PHYSICAL EXAM:  Constitutional: NAD  HEENT: anicteric sclera, oropharynx clear, MMM  Neck: No JVD  Respiratory: CTAB, no wheezes, rales or rhonchi  Cardiovascular: S1, S2, RRR  Gastrointestinal: BS+, soft, NT/ND  Extremities: No cyanosis or clubbing. No peripheral edema  Neurological: A/O x 3, no focal deficits  Psychiatric: Normal mood, normal affect  : No CVA tenderness. No rosa.   Skin: No rashes  Vascular Access: RIJ tunneled cath     LABS:  10-17    131<L>  |  92<L>  |  28<H>  ----------------------------<  134<H>  4.8   |  27  |  4.90<H>    Ca    8.8      17 Oct 2017 07:20      Creatinine Trend: 4.90 <--, 6.84 <--, 6.03 <--                        10.2   5.72  )-----------( 125      ( 18 Oct 2017 11:15 )             32.8     Urine Studies:      RADIOLOGY & ADDITIONAL STUDIES:

## 2017-10-18 NOTE — DISCHARGE NOTE ADULT - NS AS DC STROKE ED MATERIALS
Risk Factors for Stroke/Stroke Warning Signs and Symptoms/Stroke Education Booklet/Call 911 for Stroke/Prescribed Medications/Need for Followup After Discharge

## 2017-10-18 NOTE — DISCHARGE NOTE ADULT - HOSPITAL COURSE
62yM hx ESRD on HD (MWF), NICM with severe LV dysfnxn (EF 19%), biotronic ICD, Seizure (off Keppra since 2017), L upper arm PICC with home dobutamine drip, afib on coumadin, COPD on 3-4L home o2, hld, DM, and hypotension on midodrine p/w dizziness, lightheadedness, and feeling like passing out today and nausea w/ vomiting, found to have LUE and LLE weakness admitted to [facility] for Near Syncope, r/o ACS and r/o Stroke.    On admission EKG showed Afib @ 74 bpm. Cardiac enzymes negative, troponin elevated due to ESRD. (BUN/Cr: 38/6.03) Chest xray showed Trace bilateral pleural effusions. Diffuse reticular opacities likely reflect known pulmonary fibrosis, without significant change.    Patient seen by Dr. Davis from Cardiology for dizziness, lightheadedness, and feeling like passing out today. Recommended to continue  gtt (chronic), and Amio,  No ACE ARB ENTRESTO or BB given hypotension and  dependence and renal failure, Renal eval and HD to optimize volume status, stable from CV standpoint.    Neurology consulted for possible CVA of the right Internal Capsule causing weakness with likely sensory symptoms due to ESRD. NIHSS: 2. MRS: 3.  Recommendations were to continue with telemetry monitoring, Neuro Checks Q4hr, CT Head w/o, CTA Head and CTA Neck prior to next HD. CT head showed old lacunar infarcts. CTA Head and Neck showed No aneurysm, stenosis or vessel occlusion. CTA neck: No stenosis by NASCET criteria.    Nutrition Cs recommended that nutrient intake needs to be increased, therefore recommend MD order po supplement Nepro with Carb Steady® 2x daily.    ICD interrogation showed no correlated events.  Normal sensing and pacing via iterative testing.  Excellent threshold capture.  No reprogramming.    Echocardiogram showed 62yM hx ESRD on HD (MWF), NICM with severe LV dysfnxn (EF 19%), biotronic ICD, Seizure (off Keppra since 2017), L upper arm PICC with home dobutamine drip, afib on coumadin, COPD on 3-4L home o2, hld, DM, and hypotension on midodrine p/w dizziness, lightheadedness, and feeling like passing out today and nausea w/ vomiting, found to have LUE and LLE weakness admitted to [facility] for Near Syncope, r/o ACS and r/o Stroke.    On admission EKG showed Afib @ 74 bpm. Cardiac enzymes negative, troponin elevated due to ESRD. (BUN/Cr: 38/6.03) Chest xray showed Trace bilateral pleural effusions. Diffuse reticular opacities likely reflect known pulmonary fibrosis, without significant change.    Patient seen by Dr. Davis from Cardiology for dizziness, lightheadedness, and feeling like passing out today. Recommended to continue  gtt (chronic), and Amio,  No ACE ARB ENTRESTO or BB given hypotension and  dependence and renal failure, Renal eval and HD to optimize volume status, stable from CV standpoint.    Neurology consulted for possible CVA of the right Internal Capsule causing weakness with likely sensory symptoms due to ESRD. NIHSS: 2. MRS: 3.  Recommendations were to continue with telemetry monitoring, Neuro Checks Q4hr, CT Head w/o, CTA Head and CTA Neck prior to next HD. CT head showed old lacunar infarcts. CTA Head and Neck showed No aneurysm, stenosis or vessel occlusion. CTA neck: No stenosis by NASCET criteria.    Nutrition Cs recommended that nutrient intake needs to be increased, therefore recommend MD order po supplement Nepro with Carb Steady® 2x daily.    ICD interrogation showed no correlated events.  Normal sensing and pacing via iterative testing.  Excellent threshold capture.  No reprogramming.    Echocardiogram showed: 1. Normal trileaflet aortic valve. 2. Severely dilated left atrium.  LA volume index = 56  cc/m2. 3. Moderate left ventricular enlargement. 4. Severe global left ventricular systolic dysfunction. Flattening of the interventricular septum in both systole and diastole is  consistent with right ventricular pressure overload. 5. Right ventricular enlargement with decreased right  ventricular systolic function. A device wire is noted in the right heart. 6. Normal tricuspid valve.  Moderate tricuspid  regurgitation. 7. Normal pulmonic valve. Mild pulmonic regurgitation. Estimated pulmonary artery systolic pressure equals 55 mm  Hg, assuming right atrial pressure equals 10  mm Hg, consistent with moderate pulmonary hypertension. 8. Agitated saline injection resulted in a large amount of bubbles seen in the right heart. Although the exact amount of cardiac cycles that occurred prior to the crossing of  the bubbles is unclear, the findings are suggestive of a significant shunt. Consider MARGIE for further workup, if clinically warranted.	    Incomplete 62yM hx ESRD on HD (MWF), NICM with severe LV dysfnxn (EF 19%), biotronic ICD, Seizure (off Keppra since 2017), L upper arm PICC with home dobutamine drip, afib on coumadin, COPD on 3-4L home o2, hld, DM, and hypotension on midodrine p/w dizziness, lightheadedness, and feeling like passing out today and nausea w/ vomiting, found to have LUE and LLE weakness admitted to [facility] for Near Syncope, r/o ACS and r/o Stroke.    On admission EKG showed Afib @ 74 bpm. Cardiac enzymes negative, troponin elevated due to ESRD. (BUN/Cr: 38/6.03) Chest xray showed Trace bilateral pleural effusions. Diffuse reticular opacities likely reflect known pulmonary fibrosis, without significant change.    Patient seen by Dr. Davis from Cardiology for dizziness, lightheadedness, and feeling like passing out today. Recommended to continue  gtt (chronic), and Amio,  No ACE ARB ENTRESTO or BB given hypotension and  dependence and renal failure, Renal eval and HD to optimize volume status, stable from CV standpoint.    Neurology consulted for possible CVA of the right Internal Capsule causing weakness with likely sensory symptoms due to ESRD. NIHSS: 2. MRS: 3.  Recommendations were to continue with telemetry monitoring, Neuro Checks Q4hr, CT Head w/o, CTA Head and CTA Neck prior to next HD. CT head showed old lacunar infarcts. CTA Head and Neck showed No aneurysm, stenosis or vessel occlusion. CTA neck: No stenosis by NASCET criteria.    Nutrition Cs recommended that nutrient intake needs to be increased, therefore recommend MD order po supplement Nepro with Carb Steady® 2x daily.    ICD interrogation showed no correlated events.  Normal sensing and pacing via iterative testing.  Excellent threshold capture.  No reprogramming.    Echocardiogram showed: 1. Normal trileaflet aortic valve. 2. Severely dilated left atrium.  LA volume index = 56  cc/m2. 3. Moderate left ventricular enlargement. 4. Severe global left ventricular systolic dysfunction. Flattening of the interventricular septum in both systole and diastole is  consistent with right ventricular pressure overload. 5. Right ventricular enlargement with decreased right  ventricular systolic function. A device wire is noted in the right heart. 6. Normal tricuspid valve.  Moderate tricuspid  regurgitation. 7. Normal pulmonic valve. Mild pulmonic regurgitation. Estimated pulmonary artery systolic pressure equals 55 mm  Hg, assuming right atrial pressure equals 10  mm Hg, consistent with moderate pulmonary hypertension. 8. Agitated saline injection resulted in a large amount of bubbles seen in the right heart. Although the exact amount of cardiac cycles that occurred prior to the crossing of  the bubbles is unclear, the findings are suggestive of a significant shunt. Consider MARGIE for further workup, if clinically warranted.	   10/18: Med: Troponin elevation likely in the setting of  ESRD, admission EKG w/ Afib @ 74 bp w/o acute changes , + orthostatic BP 10/16, s/p CT head w/ no acute changes (old lacunar infarcts) -c/w tele monitor -Orthostatic blood pressure + 10/16, c/w  midodrine, encourage changing positions slowly -s/p AICD interrogation by EP w/ normal findings   -stroke w/u as stated below -Pending results of TTE w/ bubble study.  10/18: Renal: Maintenance HD schedule MWF Pt tolerating HD today. UF goal 2 kg as BP tolerates.  Continue midodrine preHD. 62yM hx ESRD on HD (MWF), NICM with severe LV dysfnxn (EF 19%), biotronic ICD, Seizure (off Keppra since 2017), L upper arm PICC with home dobutamine drip, afib on coumadin, COPD on 3-4L home o2, hld, DM, and hypotension on midodrine p/w dizziness, lightheadedness, and feeling like passing out today and nausea w/ vomiting, found to have LUE and LLE weakness admitted to [facility] for Near Syncope, r/o ACS and r/o Stroke.    On admission EKG showed Afib @ 74 bpm. Cardiac enzymes negative, troponin elevated due to ESRD. (BUN/Cr: 38/6.03) Chest xray showed Trace bilateral pleural effusions. Diffuse reticular opacities likely reflect known pulmonary fibrosis, without significant change.    Patient seen by Dr. Davis from Cardiology for dizziness, lightheadedness, and feeling like passing out today. Recommended to continue  gtt (chronic), and Amio,  No ACE ARB ENTRESTO or BB given hypotension and  dependence and renal failure, Renal eval and HD to optimize volume status, stable from CV standpoint.    Neurology consulted for possible CVA of the right Internal Capsule causing weakness with likely sensory symptoms due to ESRD. NIHSS: 2. MRS: 3.  Recommendations were to continue with telemetry monitoring, Neuro Checks Q4hr, CT Head w/o, CTA Head and CTA Neck prior to next HD. CT head showed old lacunar infarcts. CTA Head and Neck showed No aneurysm, stenosis or vessel occlusion. CTA neck: No stenosis by NASCET criteria.    Nutrition Cs recommended that nutrient intake needs to be increased, therefore recommend MD order po supplement Nepro with Carb Steady® 2x daily.    ICD interrogation showed no correlated events.  Normal sensing and pacing via iterative testing.  Excellent threshold capture.  No reprogramming.    Echocardiogram showed: 1. Normal trileaflet aortic valve. 2. Severely dilated left atrium.  LA volume index = 56  cc/m2. 3. Moderate left ventricular enlargement. 4. Severe global left ventricular systolic dysfunction. Flattening of the interventricular septum in both systole and diastole is  consistent with right ventricular pressure overload. 5. Right ventricular enlargement with decreased right  ventricular systolic function. A device wire is noted in the right heart. 6. Normal tricuspid valve.  Moderate tricuspid  regurgitation. 7. Normal pulmonic valve. Mild pulmonic regurgitation. Estimated pulmonary artery systolic pressure equals 55 mm  Hg, assuming right atrial pressure equals 10  mm Hg, consistent with moderate pulmonary hypertension. 8. Agitated saline injection resulted in a large amount of bubbles seen in the right heart. Although the exact amount of cardiac cycles that occurred prior to the crossing of  the bubbles is unclear, the findings are suggestive of a significant shunt. Consider MARGIE for further workup, if clinically warranted.	   10/18: Med: Troponin elevation likely in the setting of  ESRD, admission EKG w/ Afib @ 74 bp w/o acute changes , + orthostatic BP 10/16, s/p CT head w/ no acute changes (old lacunar infarcts) -c/w tele monitor -Orthostatic blood pressure + 10/16, c/w  midodrine, encourage changing positions slowly -s/p AICD interrogation by EP w/ normal findings   -stroke w/u as stated below -Pending results of TTE w/ bubble study.  10/18: Renal: Maintenance HD schedule MWF Pt tolerating HD today. UF goal 2 kg as BP tolerates.  Continue midodrine preHD.   Pt is cleared for discharge today 62yM hx ESRD on HD (MWF), NICM with severe LV dysfnxn (EF 19%), biotronic ICD, Seizure (off Keppra since 2017), L upper arm PICC with home dobutamine drip, afib on coumadin, COPD on 3-4L home o2, hld, DM, and hypotension on midodrine p/w dizziness, lightheadedness, and feeling like passing out today and nausea w/ vomiting, found to have LUE and LLE weakness admitted to [facility] for Near Syncope, r/o ACS and r/o Stroke.    On admission EKG showed Afib @ 74 bpm. Cardiac enzymes negative, troponin elevated due to ESRD. (BUN/Cr: 38/6.03) Chest xray showed Trace bilateral pleural effusions. Diffuse reticular opacities likely reflect known pulmonary fibrosis, without significant change.    Patient seen by Cardiology for dizziness, lightheadedness, and feeling like passing out today. Recommended to continue  gtt (chronic), and Amio at home dose.No ACE ARB ENTRESTO or BB given hypotension ,  dependence and renal failure. Pt w/ very poor heart function and chronic SOB but stable from CV standpoint. S/p ICD interrogation showed no correlated events.  Normal sensing and pacing via iterative testing.  Excellent threshold capture.  No reprogramming. Echocardiogram showed: 1. Normal trileaflet aortic valve. 2. Severely dilated left atrium.  LA volume index = 56cc/m2. 3. Moderate left ventricular enlargement. 4. Severe global left ventricular systolic dysfunction. Flattening of the interventricular septum in both systole and diastole is  consistent with right ventricular pressure overload. 5. Right ventricular enlargement with decreased rightventricular systolic function. A device wire is noted in the right heart. 6. Normal tricuspid valve.  Moderate tricuspid  regurgitation. 7. Normal pulmonic valve. Mild pulmonic regurgitation. Estimated pulmonary artery systolic pressure equals 55 mm  Hg, assuming right atrial pressure equals 10  mm Hg, consistent with moderate pulmonary hypertension. 8. Agitated saline injection resulted in a large amount of bubbles seen in the right heart. Although the exact amount of cardiac cycles that occurred prior to the crossing of  the bubbles is unclear, the findings are suggestive of a significant shunt. Consider MARGIE for further workup, if clinically warranted.	     Neurology consulted for possible CVA. Recommendations were to continue with telemetry monitoring, Neuro Checks , CT Head w/o, CTA Head and CTA Neck prior to next HD. CT head showed old lacunar infarcts. CTA Head and Neck showed No aneurysm, stenosis or vessel occlusion. CTA neck: No stenosis by NASCET criteria.    Nutrition Cs recommended that nutrient intake needs to be increased, therefore recommend MD order po supplement Nepro with Carb Steady® 2x daily.    Patient remained stable on tele. His weakness also improved and could possibly in setting of TIA. Pts cardiologist Dr. Davis was contacted on the day of discharge and TTE results were reviewed with him. Per Dr. Davis pt w/ very severe heart function and unable to obtain cardiac assistive devices to comorbidities and ESRD.     Of note patient with subtherapuetic INR during course. His coumadin was increased to 5mg. Pt did not want to stay in hospital. Patient and wife reported that they check his INR at HD and will have them check it tomorrow. Script was written for INR check tomorrow w/ adjustment of coumadin.     Patient was stable on discharge

## 2017-10-18 NOTE — DISCHARGE NOTE ADULT - ADDITIONAL INSTRUCTIONS
As per Attending, patient stable for discharge today and to Followup with PMD and cardiologist next week. Be sure to continue w/ Hemodialysis As per Attending, patient stable for discharge today and to Followup with PMD and cardiologist next week. Be sure to continue w/ Hemodialysis. Patient to have INR checked tomorrow at HD, script was provided.

## 2017-10-18 NOTE — DISCHARGE NOTE ADULT - MEDICATION SUMMARY - MEDICATIONS TO CHANGE
I will SWITCH the dose or number of times a day I take the medications listed below when I get home from the hospital:    Lipitor 20 mg oral tablet  -- 1 tab(s) by mouth once a day    Coumadin 3 mg oral tablet  -- 1 tab(s) by mouth once a day

## 2017-10-18 NOTE — DISCHARGE NOTE ADULT - HOME CARE AGENCY
[place] [phone], initial visit will be upon discharge home, a nurse will call to arrange home visit.    [facility] to resume your home IV infusion services.  Call them directly with any questions concerning you IV infusion services [phone].

## 2017-10-18 NOTE — PROGRESS NOTE ADULT - ASSESSMENT
62yM hx ESRD on HD (MWF), NICM with severe LV dysfnxn (EF 19% 8/25/17), biotronic ICD, Seizure (off Keppra since 7/2017), L upper arm PICC with home dobutamine drip, afib on coumadin, COPD on 3-4L home o2, hld, DM, and hypotension on midodrine p/w near syncopal episode w/ associated nausea w/ vomiting and LUE and LLE weakness admitted to tele for  r/o ACS and r/o Stroke, now with improved/resolved symptoms.

## 2017-10-18 NOTE — PROGRESS NOTE ADULT - PROBLEM SELECTOR PLAN 5
CHADS2 score=3  -c/w amio   -continue to dose coumadin Pt states at home he takes alternating days of 3mg and 1mg (wife clarified today and states that he alternates btwn 3mg and 2mg). INR still subtherapeutic today. Would dose coumadin 5mg today  -monitor INR

## 2017-10-18 NOTE — DISCHARGE NOTE ADULT - COMMUNITY RESOURCES
[facility] address [address] [place] [address] tel # [phone], fax # [phone]. Next treatment Friday 10/20/2017

## 2017-10-18 NOTE — PROGRESS NOTE ADULT - PROBLEM SELECTOR PLAN 9
On coumadin. Encourage ambulation    Severe protein malnutrition- nutrition recs reviewed and appreciated     Home w/ home PT per PT recs. D/c planning today or tomorrow pending TTE results and pt/family ability to set up INR check w/i the next couple of days.

## 2017-10-18 NOTE — DISCHARGE NOTE ADULT - PLAN OF CARE
prevent future occurrences Keep hydrated to prevent dehydration. Sit or lie down right away if feeling dizzy or faint, move slowly and let yourself get used to one position before you move to another position, move your legs often if you must sit or  one position for a long time. Avoid exercise outside on a hot day. Follow up with primary care provider in 1 week. follow up with your cardiologist in 1-2 weeks. Continue medications as prescribed. Continue low sodium diet. monitor fluid intake, monitor weight every morning. Monitor blood pressure. Do not smoke. Please inform your doctors with any new or worsening symptoms such as shortness of breath or leg swelling. Please continue to take your medications as prescribed, Low salt, low fat diet, exercise as tolerated. Do not smoke, limit alcohol and caffeine. Monitor Blood pressure and pulse. Follow up with your cardiologist in 1 week. Continue midodrine pre-dialysis Continue current medications as prescribed. Please follow up with your nephrologist to monitor your kidney function, continue with low protein diet, and continue routine hemodialysis. Continue with medications as prescribed. Decrease the total number of fat you eat, include fish in your diet, eat foods with high fiber. Limit alcohol and do not smoke. Maintain a healthy weight and exercise regularly. Follow up with primary care provider in 1 week. Continue midodrine pre-dialysis as prescribed Pt will have no decrease in pulmonary function pt renal disease will remain stable pt cholesterol will remain in normal limits Keep hydrated to prevent dehydration however remember fluid restrictions. Sit or lie down right away if feeling dizzy or faint, move slowly and let yourself get used to one position before you move to another position, move your legs often if you must sit or  one position for a long time. Avoid exercise outside on a hot day. Follow up with primary care provider in 1 week. prevent further occurrences Pt will have improvement in arrhythmia Pt will not have further episodes of hypotension prevent exacerbations follow up with your cardiologist with in 1-2weeks. Continue medications as prescribed. Continue low sodium diet. monitor fluid intake, monitor weight every morning. Monitor blood pressure. Do not smoke. Please inform your doctors with any new or worsening symptoms such as shortness of breath or leg swelling. Please continue to take your medications as prescribed. Exercise as tolerated. Do not smoke, limit alcohol and caffeine. Monitor Blood pressure and pulse. Follow up with your cardiologist in 1 week. You will need to check your INR tomorrow at HD. You were provided with a script. To prevent further episodes of hypotension To prevent further decrease in pulmonary function

## 2017-10-18 NOTE — DISCHARGE NOTE ADULT - SECONDARY DIAGNOSIS.
Chronic systolic CHF (congestive heart failure) AF (atrial fibrillation) Chronic hypotension COPD (chronic obstructive pulmonary disease) ESRD (end stage renal disease) HLD (hyperlipidemia)

## 2017-10-18 NOTE — DISCHARGE NOTE ADULT - NS AS DC FOLLOWUP STROKE INST
Coumadin/Warfarin Coumadin/Warfarin/Stroke (includes: TIA/SAH/ICH/Ischemic Stroke) Coumadin/Warfarin/Stroke (includes: TIA/SAH/ICH/Ischemic Stroke)/Smoking Cessation

## 2017-10-18 NOTE — PROGRESS NOTE ADULT - ASSESSMENT
62y Male with ESRD on HD MWF, sev CMP EF 19%, s/p AICD, on chronic dobutamine infusion, chronic hypotension, COPD, DM, HTN p/w dizziness. w/u including AICD interrogation and CTA head/neck unremarkable. Renal following for HD

## 2017-10-18 NOTE — DISCHARGE NOTE ADULT - PATIENT PORTAL LINK FT
“You can access the Follow[facility] Patient Portal, offered by [facility], by registering with the following website: [email or web address]”

## 2017-10-19 VITALS
SYSTOLIC BLOOD PRESSURE: 113 MMHG | RESPIRATION RATE: 17 BRPM | TEMPERATURE: 98 F | DIASTOLIC BLOOD PRESSURE: 64 MMHG | OXYGEN SATURATION: 95 % | HEART RATE: 77 BPM

## 2017-10-19 LAB
BUN SERPL-MCNC: 29 MG/DL — HIGH (ref 7–23)
CALCIUM SERPL-MCNC: 8.8 MG/DL — SIGNIFICANT CHANGE UP (ref 8.4–10.5)
CHLORIDE SERPL-SCNC: 92 MMOL/L — LOW (ref 98–107)
CO2 SERPL-SCNC: 22 MMOL/L — SIGNIFICANT CHANGE UP (ref 22–31)
CREAT SERPL-MCNC: 4.6 MG/DL — HIGH (ref 0.5–1.3)
GLUCOSE SERPL-MCNC: 84 MG/DL — SIGNIFICANT CHANGE UP (ref 70–99)
HCT VFR BLD CALC: 32.5 % — LOW (ref 39–50)
HGB BLD-MCNC: 10.2 G/DL — LOW (ref 13–17)
INR BLD: 1.64 — HIGH (ref 0.88–1.17)
MCHC RBC-ENTMCNC: 29.4 PG — SIGNIFICANT CHANGE UP (ref 27–34)
MCHC RBC-ENTMCNC: 31.4 % — LOW (ref 32–36)
MCV RBC AUTO: 93.7 FL — SIGNIFICANT CHANGE UP (ref 80–100)
NRBC # FLD: 0 — SIGNIFICANT CHANGE UP
PLATELET # BLD AUTO: 132 K/UL — LOW (ref 150–400)
PMV BLD: 11.8 FL — SIGNIFICANT CHANGE UP (ref 7–13)
POTASSIUM SERPL-MCNC: 4.4 MMOL/L — SIGNIFICANT CHANGE UP (ref 3.5–5.3)
POTASSIUM SERPL-SCNC: 4.4 MMOL/L — SIGNIFICANT CHANGE UP (ref 3.5–5.3)
PROTHROM AB SERPL-ACNC: 18.5 SEC — HIGH (ref 9.8–13.1)
RBC # BLD: 3.47 M/UL — LOW (ref 4.2–5.8)
RBC # FLD: 18.7 % — HIGH (ref 10.3–14.5)
SODIUM SERPL-SCNC: 131 MMOL/L — LOW (ref 135–145)
WBC # BLD: 6.54 K/UL — SIGNIFICANT CHANGE UP (ref 3.8–10.5)
WBC # FLD AUTO: 6.54 K/UL — SIGNIFICANT CHANGE UP (ref 3.8–10.5)

## 2017-10-19 PROCEDURE — 99239 HOSP IP/OBS DSCHRG MGMT >30: CPT

## 2017-10-19 RX ORDER — WARFARIN SODIUM 2.5 MG/1
5 TABLET ORAL ONCE
Qty: 0 | Refills: 0 | Status: DISCONTINUED | OUTPATIENT
Start: 2017-10-19 | End: 2017-10-19

## 2017-10-19 RX ORDER — WARFARIN SODIUM 2.5 MG/1
1 TABLET ORAL
Qty: 14 | Refills: 0 | OUTPATIENT
Start: 2017-10-19 | End: 2017-11-02

## 2017-10-19 RX ORDER — ATORVASTATIN CALCIUM 80 MG/1
1 TABLET, FILM COATED ORAL
Qty: 30 | Refills: 0 | OUTPATIENT
Start: 2017-10-19 | End: 2017-11-18

## 2017-10-19 RX ADMIN — FINASTERIDE 5 MILLIGRAM(S): 5 TABLET, FILM COATED ORAL at 11:31

## 2017-10-19 RX ADMIN — SEVELAMER CARBONATE 800 MILLIGRAM(S): 2400 POWDER, FOR SUSPENSION ORAL at 09:28

## 2017-10-19 RX ADMIN — Medication 81 MILLIGRAM(S): at 11:31

## 2017-10-19 RX ADMIN — Medication 75 MICROGRAM(S): at 05:32

## 2017-10-19 RX ADMIN — AMIODARONE HYDROCHLORIDE 200 MILLIGRAM(S): 400 TABLET ORAL at 05:32

## 2017-10-19 RX ADMIN — Medication 100 MILLIGRAM(S): at 11:31

## 2017-10-19 RX ADMIN — SEVELAMER CARBONATE 800 MILLIGRAM(S): 2400 POWDER, FOR SUSPENSION ORAL at 14:00

## 2017-10-19 RX ADMIN — MIDODRINE HYDROCHLORIDE 10 MILLIGRAM(S): 2.5 TABLET ORAL at 14:00

## 2017-10-19 RX ADMIN — BUDESONIDE AND FORMOTEROL FUMARATE DIHYDRATE 2 PUFF(S): 160; 4.5 AEROSOL RESPIRATORY (INHALATION) at 09:29

## 2017-10-19 RX ADMIN — MIDODRINE HYDROCHLORIDE 10 MILLIGRAM(S): 2.5 TABLET ORAL at 05:31

## 2017-10-19 NOTE — PROGRESS NOTE ADULT - PROBLEM SELECTOR PROBLEM 3
Chronic systolic CHF (congestive heart failure)
CHF (congestive heart failure)

## 2017-10-19 NOTE — PROGRESS NOTE ADULT - PROBLEM SELECTOR PLAN 2
Cont epogen 8k tiw w/hd  monitor H/H
Cont epogen 8k tiw w/hd  monitor H/H
S/p CT head w/ no acute changes.   -10/18 TTE w/ bubble study results noted   -s/p CTA Head/Neck on 10/16 with unremarkable findings   - c/w Neuro checks  -c/w lipitor 80mg, ASA
S/p CT head w/ no acute changes.   -f/u results TTE w/ bubble study  -s/p CTA Head/Neck on 10/16 with unremarkable findings   - c/w Neuro checks  -c/w lipitor 80mg, ASA  -Pt will need neuro follow up as outpatient
S/p CT head w/ no acute changes. Pt still reports some L sided weakness today, however not appreciated on exam today  -tele monitor  -f/u TTE w/ bubble study  -Neuro recs appreciated , will arrange for CTA head and Neck prior to HD   - Neuro checks  -f/u PT/OT recs   -c/w lipitor 80mg, ASA
S/p CT head w/ no acute changes. Pt still reports some L sided weakness today, however not appreciated on exam today  -tele monitor  -f/u TTE w/ bubble study  -pending CTA head and Neck results  - Neuro checks  -c/w lipitor 80mg, ASA  -f/u neuro recs
added epogen 8k tiw w/hd  monitor H/H
c/w midodrine

## 2017-10-19 NOTE — PROGRESS NOTE ADULT - PROBLEM SELECTOR PROBLEM 2
Anemia due to chronic kidney disease
R/O Stroke
Hypotension

## 2017-10-19 NOTE — PROGRESS NOTE ADULT - PROBLEM SELECTOR PROBLEM 4
ESRD (end stage renal disease) on dialysis
Anemia due to chronic kidney disease

## 2017-10-19 NOTE — PROGRESS NOTE ADULT - ASSESSMENT
62yM hx ESRD on HD (MWF), NICM with severe LV dysfnxn (EF 19% 8/25/17), also w/ RV dysfunction,, biotronic ICD, Seizure (off Keppra since 7/2017), L upper arm PICC with home dobutamine drip, afib on coumadin, COPD on 3-4L home o2, hld, DM, and hypotension on midodrine p/w near syncopal episode w/ associated nausea w/ vomiting and LUE and LLE weakness admitted to tele for  r/o ACS and r/o Stroke, now with improved/resolved symptoms.

## 2017-10-19 NOTE — PROGRESS NOTE ADULT - PROBLEM SELECTOR PLAN 1
Maintenance HD schedule MWF   Had yestersay. 1.5 kg removed. tolerated dialysis well  on Midodrine  Dobutamine drip
Maintenance HD schedule MWF  Pt tolerating HD today. UF goal 2 kg as BP tolerates.  Continue midodrine preHD.
Maintenance HD schedule MWF  TOlerated HD yesterday with 2kg UF achived.   Flowsheet reviewed.   Plan for HD tomorrow.   Continue midodrine preHD.
Patient denies any further symptoms.   -Troponin elevation likely in the setting of  ESRD, admission EKD w/ Afib @ 74 bp w/o acute changes , + orthostatic BP today, s/p CT head w/ no acute changes (old lacunar infarcts)  -c/w tele monitor   -Orthostatic blood pressure +, c/w  midodrine, encourage changing positions slowly   -Pending EP consult to interrogate ICD device  -stroke w/u as stated below  -f/u TTE
Patient denies any further symptoms.   -Troponin elevation likely in the setting of  ESRD, admission EKG w/ Afib @ 74 bp w/o acute changes , + orthostatic BP 10/16, s/p CT head w/ no acute changes (old lacunar infarcts)  -c/w tele monitor   -Orthostatic blood pressure + 10/16, c/w  midodrine, encourage changing positions slowly   -s/p AICD interrogation by EP w/ normal findings   -stroke w/u as stated below  -Pending  TTE w/ bubble study
Possible TIA vs orthostatic hypotension   -Troponin elevation likely in the setting of  ESRD, admission EKG w/ Afib @ 74 bp w/o acute changes , + orthostatic BP 10/16, s/p CT head w/ no acute changes (old lacunar infarcts)  -c/w tele monitor   -Orthostatic blood pressure + 10/16, c/w  midodrine, encourage changing positions slowly   -s/p AICD interrogation by EP w/ normal findings   -stroke w/u as stated below  -Pending results of TTE w/ bubble study
Possible TIA vs orthostatic hypotension   -Troponin elevation likely in the setting of  ESRD, admission EKG w/ Afib @ 74 bp w/o acute changes , + orthostatic BP on 10/16, s/p CT head w/ no acute changes (old lacunar infarcts)  -c/w tele monitor   -Orthostatic blood pressure + 10/16, c/w  midodrine, encourage changing positions slowly   -s/p AICD interrogation by EP w/ normal findings   -stroke w/u as stated below  -s/p  TTE on 10/18 results noted
maintainance hd scheduled for today w/2k bath, uf 1.5-2kg as tolerated, low bath temp  renal diet, fluid restriction 1.2L/day-reinforced w/pt

## 2017-10-19 NOTE — PROGRESS NOTE ADULT - PROBLEM SELECTOR PROBLEM 1
ESRD (end stage renal disease)
Near syncope
ESRD (end stage renal disease)

## 2017-10-19 NOTE — PROGRESS NOTE ADULT - PROBLEM SELECTOR PLAN 9
On coumadin. Encourage ambulation    Severe protein malnutrition- nutrition recs reviewed and appreciated     Home w/ home PT per PT recs. D/c planning pending cards f/u and pt/family ability to set up INR check w/i the next couple of days.

## 2017-10-19 NOTE — PROGRESS NOTE ADULT - PROBLEM SELECTOR PLAN 3
-Cardiac enzymes likely elevated in setting of ESRD. Pt appears relatively euvolemic on exam and states SOB is at his baseline    -S/p TTE 10/18 showing showing Severe global left ventricular systolic dysfunction. Flattening of the interventricular septum in both systole and diastole is  consistent with right ventricular pressure overload and  findings suggestive of a significant shunt.  -F/u w/ cardiology is re TTE findings   -C/w DASH diet w/ 1200 cc Fluid restriction  -Strict I&Os and Daily weights.  -C/w home dose dobutamine drip for now, pending cards f/u  -No ACE ARB or BB given hypotension and  dependence and renal failure
tele monitor  Cardiac enzymes likely elevated in setting of ESRD. Pt appears euvolemic on exam.   -C/w DASH diet w/ 1200 cc Fluid restriction  -Strict I&Os and Daily weights.  -C/w home dobutamine drip  -cards following   -No ACE ARB or BB given hypotension and  dependence and renal failure
tele monitor  Cardiac enzymes likely elevated in setting of ESRD. Pt appears euvolemic on exam.   -C/w DASH diet w/ 1200 cc Fluid restriction  -Strict I&Os and Daily weights.  -C/w home dobutamine drip  -cards recs appreciated   -No ACE ARB or BB given hypotension and  dependence and renal failure
tele monitor  Cardiac enzymes likely elevated in setting of ESRD. Pt appears euvolemic on exam.   -C/w DASH diet w/ 1200 cc Fluid restriction  -Strict I&Os and Daily weights.  -C/w home dobutamine drip  -cards recs appreciated   -No ACE ARB or BB given hypotension and  dependence and renal failure
on Dobutamine drip  inc uf as tolerated w/hd  will evaluate daily for need for fluid removal  f/u w/cardiology

## 2017-10-19 NOTE — PROGRESS NOTE ADULT - SUBJECTIVE AND OBJECTIVE BOX
Pt seen and examined at bedside  feels fairly well  had HD uesterday. tolerated it well  denies dizziness  no dyspnea at rest, but inc dyspnea on slight exertion. on Dobutamine drip  no chest pain,palpitation,  no gi compls      Allergies:  No Known Allergies    Hospital Medications:   MEDICATIONS  (STANDING):  amiodarone    Tablet 200 milliGRAM(s) Oral daily  aspirin enteric coated 81 milliGRAM(s) Oral daily  atorvastatin 80 milliGRAM(s) Oral at bedtime  buDESOnide 160 MICROgram(s)/formoterol 4.5 MICROgram(s) Inhaler 2 Puff(s) Inhalation two times a day  dextrose 50% Injectable 12.5 Gram(s) IV Push once  dextrose 50% Injectable 25 Gram(s) IV Push once  dextrose 50% Injectable 25 Gram(s) IV Push once  DOBUTamine Infusion 7.494 MICROgram(s)/kG/Min (17.85 mL/Hr) IV Continuous <Continuous>  docusate sodium 100 milliGRAM(s) Oral daily  epoetin alba Injectable 8000 Unit(s) IV Push <User Schedule>  finasteride 5 milliGRAM(s) Oral daily  insulin lispro (HumaLOG) corrective regimen sliding scale   SubCutaneous three times a day before meals  insulin lispro (HumaLOG) corrective regimen sliding scale   SubCutaneous at bedtime  levothyroxine 75 MICROGram(s) Oral daily  midodrine 10 milliGRAM(s) Oral every 8 hours  senna 2 Tablet(s) Oral at bedtime  sevelamer hydrochloride 800 milliGRAM(s) Oral three times a day with meals  warfarin 5 milliGRAM(s) Oral once         VITALS:  T(F): 97.8 (10-19-17 @ 05:29), Max: 98.2 (10-18-17 @ 15:15)  HR: 82 (10-19-17 @ 05:29)  BP: 103/61 (10-19-17 @ 05:29)  RR: 16 (10-19-17 @ 05:29)  SpO2: 95% (10-19-17 @ 05:29)  Wt(kg): --    10-18 @ 07:01  -  10-19 @ 07:00  --------------------------------------------------------  IN: 798 mL / OUT: 2400 mL / NET: -1602 mL    10-19 @ 07:01  -  10-19 @ 12:17  --------------------------------------------------------  IN: 240 mL / OUT: 0 mL / NET: 240 mL        PHYSICAL EXAM:  Constitutional: NAD  HEENT: anicteric sclera, oropharynx clear, MMM  Neck: No JVD  Respiratory: CTAB, no wheezes, rales or rhonchi  Cardiovascular: S1, S2, RRR  Gastrointestinal: BS+, soft, NT/ND  Extremities: No cyanosis or clubbing. No peripheral edema  Neurological: A/O x 3, no focal deficits  Psychiatric: Normal mood, normal affect  : No CVA tenderness. No rosa.   Skin: No rashes  Vascular Access:    LABS:  10-19    131<L>  |  92<L>  |  29<H>  ----------------------------<  84  4.4   |  22  |  4.60<H>    Ca    8.8      19 Oct 2017 07:25      Creatinine Trend: 4.60 <--, 6.11 <--, 4.90 <--, 6.84 <--, 6.03 <--                        10.2   6.54  )-----------( 132      ( 19 Oct 2017 07:25 )             32.5     Urine Studies:      RADIOLOGY & ADDITIONAL STUDIES:

## 2017-10-28 ENCOUNTER — INPATIENT (INPATIENT)
Facility: HOSPITAL | Age: 62
LOS: 3 days | Discharge: HOME HEALTH | End: 2017-11-01
Attending: HOSPITALIST | Admitting: HOSPITALIST
Payer: COMMERCIAL

## 2017-10-28 VITALS
TEMPERATURE: 98 F | DIASTOLIC BLOOD PRESSURE: 51 MMHG | SYSTOLIC BLOOD PRESSURE: 87 MMHG | OXYGEN SATURATION: 100 % | HEART RATE: 80 BPM | RESPIRATION RATE: 22 BRPM

## 2017-10-28 DIAGNOSIS — R53.1 WEAKNESS: ICD-10-CM

## 2017-10-28 LAB
ALBUMIN SERPL ELPH-MCNC: 3.3 G/DL — SIGNIFICANT CHANGE UP (ref 3.3–5)
ALP SERPL-CCNC: 207 U/L — HIGH (ref 40–120)
ALT FLD-CCNC: 19 U/L — SIGNIFICANT CHANGE UP (ref 4–41)
APTT BLD: 50.6 SEC — HIGH (ref 27.5–37.4)
AST SERPL-CCNC: 27 U/L — SIGNIFICANT CHANGE UP (ref 4–40)
BASE EXCESS BLDV CALC-SCNC: 6 MMOL/L — SIGNIFICANT CHANGE UP
BASOPHILS # BLD AUTO: 0.03 K/UL — SIGNIFICANT CHANGE UP (ref 0–0.2)
BASOPHILS NFR BLD AUTO: 0.5 % — SIGNIFICANT CHANGE UP (ref 0–2)
BILIRUB SERPL-MCNC: 0.6 MG/DL — SIGNIFICANT CHANGE UP (ref 0.2–1.2)
BLOOD GAS VENOUS - CREATININE: 4.69 MG/DL — HIGH (ref 0.5–1.3)
BUN SERPL-MCNC: 29 MG/DL — HIGH (ref 7–23)
CALCIUM SERPL-MCNC: 9.2 MG/DL — SIGNIFICANT CHANGE UP (ref 8.4–10.5)
CHLORIDE BLDV-SCNC: 101 MMOL/L — SIGNIFICANT CHANGE UP (ref 96–108)
CHLORIDE SERPL-SCNC: 99 MMOL/L — SIGNIFICANT CHANGE UP (ref 98–107)
CK MB BLD-MCNC: 3.71 NG/ML — SIGNIFICANT CHANGE UP (ref 1–6.6)
CK MB BLD-MCNC: SIGNIFICANT CHANGE UP (ref 0–2.5)
CK SERPL-CCNC: 72 U/L — SIGNIFICANT CHANGE UP (ref 30–200)
CO2 SERPL-SCNC: 26 MMOL/L — SIGNIFICANT CHANGE UP (ref 22–31)
CREAT SERPL-MCNC: 4.53 MG/DL — HIGH (ref 0.5–1.3)
EOSINOPHIL # BLD AUTO: 0.25 K/UL — SIGNIFICANT CHANGE UP (ref 0–0.5)
EOSINOPHIL NFR BLD AUTO: 3.9 % — SIGNIFICANT CHANGE UP (ref 0–6)
GAS PNL BLDV: 140 MMOL/L — SIGNIFICANT CHANGE UP (ref 136–146)
GLUCOSE BLDV-MCNC: 176 — HIGH (ref 70–99)
GLUCOSE SERPL-MCNC: 167 MG/DL — HIGH (ref 70–99)
HCO3 BLDV-SCNC: 28 MMOL/L — HIGH (ref 20–27)
HCT VFR BLD CALC: 36.9 % — LOW (ref 39–50)
HCT VFR BLDV CALC: 34.7 % — LOW (ref 39–51)
HGB BLD-MCNC: 10.8 G/DL — LOW (ref 13–17)
HGB BLDV-MCNC: 11.3 G/DL — LOW (ref 13–17)
IMM GRANULOCYTES # BLD AUTO: 0.02 # — SIGNIFICANT CHANGE UP
IMM GRANULOCYTES NFR BLD AUTO: 0.3 % — SIGNIFICANT CHANGE UP (ref 0–1.5)
INR BLD: 2.01 — HIGH (ref 0.88–1.17)
LACTATE BLDV-MCNC: 1.7 MMOL/L — SIGNIFICANT CHANGE UP (ref 0.5–2)
LYMPHOCYTES # BLD AUTO: 1.28 K/UL — SIGNIFICANT CHANGE UP (ref 1–3.3)
LYMPHOCYTES # BLD AUTO: 20 % — SIGNIFICANT CHANGE UP (ref 13–44)
MCHC RBC-ENTMCNC: 28.8 PG — SIGNIFICANT CHANGE UP (ref 27–34)
MCHC RBC-ENTMCNC: 29.3 % — LOW (ref 32–36)
MCV RBC AUTO: 98.4 FL — SIGNIFICANT CHANGE UP (ref 80–100)
MONOCYTES # BLD AUTO: 0.92 K/UL — HIGH (ref 0–0.9)
MONOCYTES NFR BLD AUTO: 14.4 % — HIGH (ref 2–14)
NEUTROPHILS # BLD AUTO: 3.89 K/UL — SIGNIFICANT CHANGE UP (ref 1.8–7.4)
NEUTROPHILS NFR BLD AUTO: 60.9 % — SIGNIFICANT CHANGE UP (ref 43–77)
NRBC # FLD: 0 — SIGNIFICANT CHANGE UP
NT-PROBNP SERPL-SCNC: SIGNIFICANT CHANGE UP PG/ML
OB PNL STL: NEGATIVE — SIGNIFICANT CHANGE UP
PCO2 BLDV: 55 MMHG — HIGH (ref 41–51)
PH BLDV: 7.37 PH — SIGNIFICANT CHANGE UP (ref 7.32–7.43)
PLATELET # BLD AUTO: 132 K/UL — LOW (ref 150–400)
PMV BLD: 12.9 FL — SIGNIFICANT CHANGE UP (ref 7–13)
PO2 BLDV: 29 MMHG — LOW (ref 35–40)
POTASSIUM BLDV-SCNC: 4 MMOL/L — SIGNIFICANT CHANGE UP (ref 3.4–4.5)
POTASSIUM SERPL-MCNC: 4.1 MMOL/L — SIGNIFICANT CHANGE UP (ref 3.5–5.3)
POTASSIUM SERPL-SCNC: 4.1 MMOL/L — SIGNIFICANT CHANGE UP (ref 3.5–5.3)
PROT SERPL-MCNC: 8.4 G/DL — HIGH (ref 6–8.3)
PROTHROM AB SERPL-ACNC: 22.8 SEC — HIGH (ref 9.8–13.1)
RBC # BLD: 3.75 M/UL — LOW (ref 4.2–5.8)
RBC # FLD: 18.8 % — HIGH (ref 10.3–14.5)
SAO2 % BLDV: 45 % — LOW (ref 60–85)
SODIUM SERPL-SCNC: 140 MMOL/L — SIGNIFICANT CHANGE UP (ref 135–145)
TROPONIN T SERPL-MCNC: 0.36 NG/ML — HIGH (ref 0–0.06)
WBC # BLD: 6.39 K/UL — SIGNIFICANT CHANGE UP (ref 3.8–10.5)
WBC # FLD AUTO: 6.39 K/UL — SIGNIFICANT CHANGE UP (ref 3.8–10.5)

## 2017-10-28 PROCEDURE — 70450 CT HEAD/BRAIN W/O DYE: CPT | Mod: 26

## 2017-10-28 PROCEDURE — 71010: CPT | Mod: 26

## 2017-10-28 RX ORDER — SODIUM CHLORIDE 9 MG/ML
1000 INJECTION INTRAMUSCULAR; INTRAVENOUS; SUBCUTANEOUS ONCE
Qty: 0 | Refills: 0 | Status: COMPLETED | OUTPATIENT
Start: 2017-10-28 | End: 2017-10-28

## 2017-10-28 RX ADMIN — SODIUM CHLORIDE 1000 MILLILITER(S): 9 INJECTION INTRAMUSCULAR; INTRAVENOUS; SUBCUTANEOUS at 19:06

## 2017-10-28 NOTE — ED PROVIDER NOTE - PROGRESS NOTE DETAILS
INR only 2 here, heme occult neg, brown stool on guaiac, H&H (10.8/36.9) better than was on recent admission, therefore no indication for vit K or kcentra at this time. Low suspicion for gi bleed, however - given HPI of cp, sob, lightheadedness, general weakness, pt will still need admission for trending of Bert, cards consult regarding hypotension on midodrine.   -Dr. Galaviz Arnol Burkett MD PGY3: d/w Dr. Costello (Dr. Davis's covering doc;cardiology) okay for tele floor, does not require CCU consult at this time. Also d/w Cards NP agrees pt okay for floors with home  drip

## 2017-10-28 NOTE — ED PROVIDER NOTE - ATTENDING CONTRIBUTION TO CARE
Pertinent PMH/PSH/FHx/SHx and Review of Systems contained within:  61yo M pmh inclusive of htn, dm, copd, aicd, chf (EF 19%) on chronic dobutamine infusion, hypotension on midodrine TID (followed by Dr. Davis of Cardiology service), anemia 2/2 to chronic kidney dx, and ESRD on HD M,W,F  brought in for Pertinent PMH/PSH/FHx/SHx and Review of Systems contained within:  61yo M pmh inclusive of htn, dm, copd, aicd on coumadin (last dose was 4days ago), chf (EF 19%) on chronic dobutamine infusion, hypotension on midodrine TID (followed by Dr. Davis of Cardiology service), anemia 2/2 to chronic kidney dx, and ESRD on HD M,W,F  recent admission for near syncope, brought in today for supratherapeutic INR ysty (9), dark stools for past few days, abdominal discomfort, intermittent cp today associated w/ sob lightheadedness and confusion since ystdy. +Chills, +nausea and 1 episode of nbnb emesis. No fever, No photophobia/eye pain/changes in vision, No ear pain/sore throat/dysphagia, No cough/wheeze/stridor, no dysuria/frequency/discharge, No neck/back pain, no rash, no focal neuro symptoms.   Gen: Alert, NAD  Head: NC, AT, PERRL, EOMI, normal lids/conjunctiva  ENT: B TM WNL, normal hearing, patent oropharynx without erythema/exudate, uvula midline  Neck: +supple, no tenderness/meningismus/JVD, +Trachea midline  Chest: no chest wall tenderness, equal chest rise  Pulm: Bilateral BS, normal resp effort, no wheeze/stridor/retractions  CV: RRR, no M/R/G, +dist pulses  Abd: soft, NT/ND, +BS, no hepatosplenomegaly  Rectal: deferred  Mskel: no edema/erythema/cyanosis  Skin: no rash  Neuro: AAOx3, no sensory/motor deficits, CN 2-12 intact   MDM: Pertinent PMH/PSH/FHx/SHx and Review of Systems contained within:  63yo M pmh inclusive of htn, dm, copd, aicd on coumadin (last dose was 4days ago), chf (EF 19%) on chronic dobutamine infusion, hypotension on midodrine TID (followed by Dr. Davis of Cardiology service), anemia 2/2 to chronic kidney dx, and ESRD on HD M,W,F  recent admission for near syncope, brought in today for supratherapeutic INR ysty (9), dark stools for past few days, abdominal discomfort, intermittent cp today associated w/ sob lightheadedness and confusion since ystdy. +Chills, +nausea and 1 episode of nbnb emesis. No fever, No photophobia/eye pain/changes in vision, No ear pain/sore throat/dysphagia, No cough/wheeze/stridor, no dysuria/frequency/discharge, No neck/back pain, no rash, no focal neuro symptoms.   Gen: Alert, NAD  Head: NC, AT,  EOMI, normal lids/conjunctiva  Neck: +supple, no tenderness/meningismus/JVD, +Trachea midline  Chest: no chest wall tenderness, equal chest rise, dialysis port right chest wall, c/d/i.  Pulm: Bilateral BS, normal resp effort, no wheeze/stridor/retractions  CV: RRR, no M/R/G, +dist pulses  Abd: soft, NT/ND, +BS, no hepatosplenomegaly  Rectal: deferred  Mskel: no edema/erythema/cyanosis, dobutamine infusion via line in left arm  Neuro: AAOx3, no sensory/motor deficits  MDM:  63yo M, with complicated pmh as above, here for MMC including dark stools/general weakness/cp/sob/lightheadedness/abd discomfort in setting of supratherapeutic INR. Concerned for GI bleed. Soft fluids as pt is hypotensive (SBP currently in low 80s and pt states normally around 105), 500cc bolus given hx of chf w/ poor EF. Labs, type/screen, Vit K plus/minus kcentra for reversal depending on INR here, possible transfusion, and likely admission.

## 2017-10-28 NOTE — ED ADULT TRIAGE NOTE - CHIEF COMPLAINT QUOTE
After dialysis yesterday and pt c/o dizziness and sob--pt called by dialysis and told INR was almost 8  Pt c/o sob and weakness with some chest discomfort. Pt noted to be very hypotensive 87/51  Pt c/o stomach discomfort at present

## 2017-10-28 NOTE — ED ADULT NURSE NOTE - OBJECTIVE STATEMENT
Pt received in spot 3. Alert and oriented x3, ambulatory. Began co abdominal pain after full dialysis treatment yesterday. Pt received in spot 3. Alert and oriented x3, ambulatory. Hx of afib, chf, copd, dm, MI, AICD. Began co abdominal pain after full dialysis treatment yesterday. Denies n/v/d. Also co dizziness and sob. Pt placed on 3 L NC which pt uses at home. Dialysis (M, W, F), Permacath to right chest wall. PICC line to left forearm, arrives with dobutamine drip running at 8.9ml/hr. Labs sent. IV placed. VS as stated. BP 89/54, NS fluid bolus running as per orders. Afib on cardiac monitor. Skin intact. Comfort meausres provided. Will continue to monitor.

## 2017-10-29 DIAGNOSIS — R42 DIZZINESS AND GIDDINESS: ICD-10-CM

## 2017-10-29 DIAGNOSIS — I95.89 OTHER HYPOTENSION: ICD-10-CM

## 2017-10-29 DIAGNOSIS — R74.8 ABNORMAL LEVELS OF OTHER SERUM ENZYMES: ICD-10-CM

## 2017-10-29 DIAGNOSIS — K59.00 CONSTIPATION, UNSPECIFIED: ICD-10-CM

## 2017-10-29 DIAGNOSIS — N18.6 END STAGE RENAL DISEASE: ICD-10-CM

## 2017-10-29 DIAGNOSIS — I50.22 CHRONIC SYSTOLIC (CONGESTIVE) HEART FAILURE: ICD-10-CM

## 2017-10-29 DIAGNOSIS — Z29.9 ENCOUNTER FOR PROPHYLACTIC MEASURES, UNSPECIFIED: ICD-10-CM

## 2017-10-29 DIAGNOSIS — M79.602 PAIN IN LEFT ARM: ICD-10-CM

## 2017-10-29 DIAGNOSIS — I48.2 CHRONIC ATRIAL FIBRILLATION: ICD-10-CM

## 2017-10-29 LAB
BUN SERPL-MCNC: 34 MG/DL — HIGH (ref 7–23)
CALCIUM SERPL-MCNC: 8.9 MG/DL — SIGNIFICANT CHANGE UP (ref 8.4–10.5)
CHLORIDE SERPL-SCNC: 99 MMOL/L — SIGNIFICANT CHANGE UP (ref 98–107)
CK MB BLD-MCNC: 3.41 NG/ML — SIGNIFICANT CHANGE UP (ref 1–6.6)
CK SERPL-CCNC: 66 U/L — SIGNIFICANT CHANGE UP (ref 30–200)
CO2 SERPL-SCNC: 25 MMOL/L — SIGNIFICANT CHANGE UP (ref 22–31)
CREAT SERPL-MCNC: 5.19 MG/DL — HIGH (ref 0.5–1.3)
GLUCOSE BLDC GLUCOMTR-MCNC: 111 MG/DL — HIGH (ref 70–99)
GLUCOSE BLDC GLUCOMTR-MCNC: 89 MG/DL — SIGNIFICANT CHANGE UP (ref 70–99)
GLUCOSE SERPL-MCNC: 86 MG/DL — SIGNIFICANT CHANGE UP (ref 70–99)
HCT VFR BLD CALC: 35.5 % — LOW (ref 39–50)
HGB BLD-MCNC: 10.7 G/DL — LOW (ref 13–17)
INR BLD: 1.7 — HIGH (ref 0.88–1.17)
MAGNESIUM SERPL-MCNC: 2.1 MG/DL — SIGNIFICANT CHANGE UP (ref 1.6–2.6)
MCHC RBC-ENTMCNC: 29.2 PG — SIGNIFICANT CHANGE UP (ref 27–34)
MCHC RBC-ENTMCNC: 30.1 % — LOW (ref 32–36)
MCV RBC AUTO: 97 FL — SIGNIFICANT CHANGE UP (ref 80–100)
NRBC # FLD: 0 — SIGNIFICANT CHANGE UP
PHOSPHATE SERPL-MCNC: 2.5 MG/DL — SIGNIFICANT CHANGE UP (ref 2.5–4.5)
PLATELET # BLD AUTO: 113 K/UL — LOW (ref 150–400)
PMV BLD: 12.9 FL — SIGNIFICANT CHANGE UP (ref 7–13)
POTASSIUM SERPL-MCNC: 4.3 MMOL/L — SIGNIFICANT CHANGE UP (ref 3.5–5.3)
POTASSIUM SERPL-SCNC: 4.3 MMOL/L — SIGNIFICANT CHANGE UP (ref 3.5–5.3)
PROTHROM AB SERPL-ACNC: 19.3 SEC — HIGH (ref 9.8–13.1)
RBC # BLD: 3.66 M/UL — LOW (ref 4.2–5.8)
RBC # FLD: 18.8 % — HIGH (ref 10.3–14.5)
SODIUM SERPL-SCNC: 139 MMOL/L — SIGNIFICANT CHANGE UP (ref 135–145)
TROPONIN T SERPL-MCNC: 0.35 NG/ML — HIGH (ref 0–0.06)
TROPONIN T SERPL-MCNC: 0.36 NG/ML — HIGH (ref 0–0.06)
WBC # BLD: 5.69 K/UL — SIGNIFICANT CHANGE UP (ref 3.8–10.5)
WBC # FLD AUTO: 5.69 K/UL — SIGNIFICANT CHANGE UP (ref 3.8–10.5)

## 2017-10-29 PROCEDURE — 12345: CPT | Mod: NC

## 2017-10-29 PROCEDURE — 99223 1ST HOSP IP/OBS HIGH 75: CPT | Mod: GC

## 2017-10-29 RX ORDER — INSULIN LISPRO 100/ML
VIAL (ML) SUBCUTANEOUS
Qty: 0 | Refills: 0 | Status: DISCONTINUED | OUTPATIENT
Start: 2017-10-29 | End: 2017-11-01

## 2017-10-29 RX ORDER — SENNA PLUS 8.6 MG/1
2 TABLET ORAL AT BEDTIME
Qty: 0 | Refills: 0 | Status: DISCONTINUED | OUTPATIENT
Start: 2017-10-29 | End: 2017-11-01

## 2017-10-29 RX ORDER — ASPIRIN/CALCIUM CARB/MAGNESIUM 324 MG
81 TABLET ORAL DAILY
Qty: 0 | Refills: 0 | Status: DISCONTINUED | OUTPATIENT
Start: 2017-10-29 | End: 2017-11-01

## 2017-10-29 RX ORDER — BUDESONIDE AND FORMOTEROL FUMARATE DIHYDRATE 160; 4.5 UG/1; UG/1
2 AEROSOL RESPIRATORY (INHALATION)
Qty: 0 | Refills: 0 | Status: DISCONTINUED | OUTPATIENT
Start: 2017-10-29 | End: 2017-11-01

## 2017-10-29 RX ORDER — INSULIN LISPRO 100/ML
VIAL (ML) SUBCUTANEOUS AT BEDTIME
Qty: 0 | Refills: 0 | Status: DISCONTINUED | OUTPATIENT
Start: 2017-10-29 | End: 2017-11-01

## 2017-10-29 RX ORDER — SEVELAMER CARBONATE 2400 MG/1
800 POWDER, FOR SUSPENSION ORAL
Qty: 0 | Refills: 0 | Status: DISCONTINUED | OUTPATIENT
Start: 2017-10-29 | End: 2017-11-01

## 2017-10-29 RX ORDER — ONDANSETRON 8 MG/1
4 TABLET, FILM COATED ORAL EVERY 8 HOURS
Qty: 0 | Refills: 0 | Status: DISCONTINUED | OUTPATIENT
Start: 2017-10-29 | End: 2017-11-01

## 2017-10-29 RX ORDER — INFLUENZA VIRUS VACCINE 15; 15; 15; 15 UG/.5ML; UG/.5ML; UG/.5ML; UG/.5ML
0.5 SUSPENSION INTRAMUSCULAR ONCE
Qty: 0 | Refills: 0 | Status: DISCONTINUED | OUTPATIENT
Start: 2017-10-29 | End: 2017-11-01

## 2017-10-29 RX ORDER — LEVOTHYROXINE SODIUM 125 MCG
75 TABLET ORAL ONCE
Qty: 0 | Refills: 0 | Status: COMPLETED | OUTPATIENT
Start: 2017-10-29 | End: 2017-10-29

## 2017-10-29 RX ORDER — AMIODARONE HYDROCHLORIDE 400 MG/1
200 TABLET ORAL DAILY
Qty: 0 | Refills: 0 | Status: DISCONTINUED | OUTPATIENT
Start: 2017-10-29 | End: 2017-11-01

## 2017-10-29 RX ORDER — POLYETHYLENE GLYCOL 3350 17 G/17G
17 POWDER, FOR SOLUTION ORAL DAILY
Qty: 0 | Refills: 0 | Status: DISCONTINUED | OUTPATIENT
Start: 2017-10-29 | End: 2017-11-01

## 2017-10-29 RX ORDER — FINASTERIDE 5 MG/1
5 TABLET, FILM COATED ORAL DAILY
Qty: 0 | Refills: 0 | Status: DISCONTINUED | OUTPATIENT
Start: 2017-10-29 | End: 2017-11-01

## 2017-10-29 RX ORDER — ATORVASTATIN CALCIUM 80 MG/1
80 TABLET, FILM COATED ORAL AT BEDTIME
Qty: 0 | Refills: 0 | Status: DISCONTINUED | OUTPATIENT
Start: 2017-10-29 | End: 2017-11-01

## 2017-10-29 RX ORDER — DOBUTAMINE HCL 250MG/20ML
7.49 VIAL (ML) INTRAVENOUS
Qty: 500 | Refills: 0 | Status: DISCONTINUED | OUTPATIENT
Start: 2017-10-29 | End: 2017-11-01

## 2017-10-29 RX ORDER — LEVOTHYROXINE SODIUM 125 MCG
75 TABLET ORAL DAILY
Qty: 0 | Refills: 0 | Status: DISCONTINUED | OUTPATIENT
Start: 2017-10-29 | End: 2017-11-01

## 2017-10-29 RX ORDER — WARFARIN SODIUM 2.5 MG/1
2 TABLET ORAL ONCE
Qty: 0 | Refills: 0 | Status: COMPLETED | OUTPATIENT
Start: 2017-10-29 | End: 2017-10-29

## 2017-10-29 RX ORDER — DOCUSATE SODIUM 100 MG
100 CAPSULE ORAL THREE TIMES A DAY
Qty: 0 | Refills: 0 | Status: DISCONTINUED | OUTPATIENT
Start: 2017-10-29 | End: 2017-11-01

## 2017-10-29 RX ORDER — MIDODRINE HYDROCHLORIDE 2.5 MG/1
10 TABLET ORAL ONCE
Qty: 0 | Refills: 0 | Status: COMPLETED | OUTPATIENT
Start: 2017-10-29 | End: 2017-10-29

## 2017-10-29 RX ORDER — MIDODRINE HYDROCHLORIDE 2.5 MG/1
10 TABLET ORAL EVERY 8 HOURS
Qty: 0 | Refills: 0 | Status: DISCONTINUED | OUTPATIENT
Start: 2017-10-29 | End: 2017-11-01

## 2017-10-29 RX ORDER — ONDANSETRON 8 MG/1
4 TABLET, FILM COATED ORAL EVERY 6 HOURS
Qty: 0 | Refills: 0 | Status: DISCONTINUED | OUTPATIENT
Start: 2017-10-29 | End: 2017-11-01

## 2017-10-29 RX ORDER — AMIODARONE HYDROCHLORIDE 400 MG/1
200 TABLET ORAL ONCE
Qty: 0 | Refills: 0 | Status: COMPLETED | OUTPATIENT
Start: 2017-10-29 | End: 2017-10-29

## 2017-10-29 RX ADMIN — ONDANSETRON 4 MILLIGRAM(S): 8 TABLET, FILM COATED ORAL at 06:45

## 2017-10-29 RX ADMIN — MIDODRINE HYDROCHLORIDE 10 MILLIGRAM(S): 2.5 TABLET ORAL at 13:43

## 2017-10-29 RX ADMIN — Medication 100 MILLIGRAM(S): at 13:43

## 2017-10-29 RX ADMIN — WARFARIN SODIUM 2 MILLIGRAM(S): 2.5 TABLET ORAL at 01:34

## 2017-10-29 RX ADMIN — WARFARIN SODIUM 2 MILLIGRAM(S): 2.5 TABLET ORAL at 21:29

## 2017-10-29 RX ADMIN — MIDODRINE HYDROCHLORIDE 10 MILLIGRAM(S): 2.5 TABLET ORAL at 08:54

## 2017-10-29 RX ADMIN — MIDODRINE HYDROCHLORIDE 10 MILLIGRAM(S): 2.5 TABLET ORAL at 21:28

## 2017-10-29 RX ADMIN — MIDODRINE HYDROCHLORIDE 10 MILLIGRAM(S): 2.5 TABLET ORAL at 05:53

## 2017-10-29 RX ADMIN — Medication 17.85 MICROGRAM(S)/KG/MIN: at 07:01

## 2017-10-29 RX ADMIN — FINASTERIDE 5 MILLIGRAM(S): 5 TABLET, FILM COATED ORAL at 12:09

## 2017-10-29 RX ADMIN — AMIODARONE HYDROCHLORIDE 200 MILLIGRAM(S): 400 TABLET ORAL at 08:54

## 2017-10-29 RX ADMIN — Medication 1: at 18:01

## 2017-10-29 RX ADMIN — SEVELAMER CARBONATE 800 MILLIGRAM(S): 2400 POWDER, FOR SUSPENSION ORAL at 18:01

## 2017-10-29 RX ADMIN — Medication 75 MICROGRAM(S): at 05:52

## 2017-10-29 RX ADMIN — Medication 100 MILLIGRAM(S): at 05:52

## 2017-10-29 RX ADMIN — Medication 75 MICROGRAM(S): at 08:54

## 2017-10-29 RX ADMIN — Medication 81 MILLIGRAM(S): at 12:09

## 2017-10-29 RX ADMIN — ATORVASTATIN CALCIUM 80 MILLIGRAM(S): 80 TABLET, FILM COATED ORAL at 21:29

## 2017-10-29 RX ADMIN — BUDESONIDE AND FORMOTEROL FUMARATE DIHYDRATE 2 PUFF(S): 160; 4.5 AEROSOL RESPIRATORY (INHALATION) at 12:09

## 2017-10-29 RX ADMIN — BUDESONIDE AND FORMOTEROL FUMARATE DIHYDRATE 2 PUFF(S): 160; 4.5 AEROSOL RESPIRATORY (INHALATION) at 21:28

## 2017-10-29 RX ADMIN — Medication 17.85 MICROGRAM(S)/KG/MIN: at 21:46

## 2017-10-29 RX ADMIN — SEVELAMER CARBONATE 800 MILLIGRAM(S): 2400 POWDER, FOR SUSPENSION ORAL at 12:09

## 2017-10-29 RX ADMIN — SEVELAMER CARBONATE 800 MILLIGRAM(S): 2400 POWDER, FOR SUSPENSION ORAL at 08:54

## 2017-10-29 RX ADMIN — Medication 100 MILLIGRAM(S): at 21:28

## 2017-10-29 RX ADMIN — AMIODARONE HYDROCHLORIDE 200 MILLIGRAM(S): 400 TABLET ORAL at 05:52

## 2017-10-29 NOTE — H&P ADULT - PROBLEM SELECTOR PLAN 5
- TTE from last admission showed EF of 21% with severe global LV dysfunction  - c/w Dobutamine drip at 7mcg/kg/min  - Consult Cardiology (Dr. Davis) in the AM - TTE from last admission on 10/18 showed EF of 21% with severe global LV dysfunction  - c/w Dobutamine drip at 7mcg/kg/min  - Consult Cardiology (Dr. Davis) in the AM

## 2017-10-29 NOTE — PROGRESS NOTE ADULT - PROBLEM SELECTOR PLAN 8
- Pt states has been having difficulty moving his bowels   - very hard and formed stools   - denies BRBPR and Melena   - Colace and adriana for constipation - Pt states has been having difficulty moving his bowels   - very hard and formed stools   - denies BRBPR and Melena   - Colace and senna for constipation - Pt states has been having difficulty moving his bowels   - very hard and formed stools   - denies BRBPR and Melena   - Colace and senna for constipation  -added miralax

## 2017-10-29 NOTE — H&P ADULT - ATTENDING COMMENTS
63 y/o male HX of ESRD on HD, ischemic Cardiomyopathy , EF 21%, AICD, on Dobutamine infusion, + PICC LINE in LUE, A Fib on Coumadin, COPD on Home O2 NC, Hypothyroid,  HTN, BPH, GOUT, DM, HX of Systolic CHF, A+O x3, Pt c/o Dizziness intermittent, Elevated INR 8 yesterday as per pt, INR 2.01 in [facility], LUE  pain, Chronic SOB, [place], left sided CP intermittent, no Fever, no PRBPR, pt is not making urine anymore, HX of Episodic Abdominal pain, with + N/V, HX of Chronic Hypotension ,  no fever, Occult stool Neg., CT Head : no acute findings noted, + Old CVA, C/O Hard stool, + Dark stool as well,     on ASA, Cardiology consult, TELE Monitor, on Amiodarone PO, Lipitor, Symbicort , Dobutamine Drip, PO Midodrine, HgbA1c, Fructosamine , CBC, CMP, TSH, Free T4, Hep A,B,C  profile, On Coumadin, Colace, Senna, Renal consult for HD, Venous Doppler of LUE, fall/aspiration precaution, Proscar, FS, Sliding scale , CT A/P No contrast, CT LUE no contrast,     Case D/W Pt, HS, Nursing,    Pt was seen & examined by me Dr. KAYA Amaya 0n 10/29/17.

## 2017-10-29 NOTE — H&P ADULT - PROBLEM SELECTOR PLAN 4
- Pt c/o 2 weeks of LUE pain due to his PICC line placement   - has difficulty with elevating his left arm  - tender to palpation, but no sign of swelling or erythema   - PICC line sites no sign of erythema or drainage    - Duplex US of the left upper ext. to r/o clot or phlebitis

## 2017-10-29 NOTE — H&P ADULT - NSHPPHYSICALEXAM_GEN_ALL_CORE
Vital Signs Last 24 Hrs  T(C): 36.8 (28 Oct 2017 23:47), Max: 36.8 (28 Oct 2017 23:47)  T(F): 98.3 (28 Oct 2017 23:47), Max: 98.3 (28 Oct 2017 23:47)  HR: 73 (28 Oct 2017 23:47) (73 - 80)  BP: 88/50 (28 Oct 2017 23:47) (87/51 - 89/54)  BP(mean): --  RR: 17 (28 Oct 2017 23:47) (16 - 22)  SpO2: 89% (28 Oct 2017 23:47) (89% - 100%)    PHYSICAL EXAM:  GENERAL: NAD, well-groomed, well-developed  HEAD:  Atraumatic, Normocephalic  EYES: EOMI, PERRLA, conjunctiva and sclera clear  ENMT: No tonsillar erythema, exudates, or enlargement; Moist mucous membranes  CHEST/LUNG: Clear to percussion bilaterally; No rales, rhonchi, wheezing, or rubs  HEART: Irregularly irregular; No murmurs, rubs, or gallops  ABDOMEN: Soft, Nontender, Nondistended; Bowel sounds present  NERVOUS SYSTEM:  Alert & Oriented X3, Good concentration. Motor Strength 5/5 B/L upper and lower extremities  MSK: Left upper arm tender to palpation Vital Signs Last 24 Hrs  T(C): 36.8 (28 Oct 2017 23:47), Max: 36.8 (28 Oct 2017 23:47)  T(F): 98.3 (28 Oct 2017 23:47), Max: 98.3 (28 Oct 2017 23:47)  HR: 73 (28 Oct 2017 23:47) (73 - 80)  BP: 88/50 (28 Oct 2017 23:47) (87/51 - 89/54)  BP(mean): --  RR: 17 (28 Oct 2017 23:47) (16 - 22)  SpO2: 89% (28 Oct 2017 23:47) (89% - 100%)    PHYSICAL EXAM:  GENERAL: NAD, well-groomed, well-developed  HEAD:  Atraumatic, Normocephalic  EYES: EOMI, PERRLA, conjunctiva and sclera clear  ENMT: No tonsillar erythema, exudates, or enlargement; Moist mucous membranes  CHEST/LUNG: Clear to percussion bilaterally; No rales, rhonchi, wheezing, or rubs  HEART: NSR; No murmurs, rubs, or gallops  ABDOMEN: Soft, Nontender, Nondistended; Bowel sounds present  NERVOUS SYSTEM:  Alert & Oriented X3, Good concentration. Motor Strength 5/5 B/L upper and lower extremities  MSK: Left upper arm tender to palpation

## 2017-10-29 NOTE — PROGRESS NOTE ADULT - PROBLEM SELECTOR PLAN 6
- c/w Sevelamer TID   - HD MWF  - Contact nephrology to setup HD on Monday - c/w Sevelamer TID   - HD MWF  - apprec Renal c/s

## 2017-10-29 NOTE — H&P ADULT - ASSESSMENT
62 y.o. male with ESRD on HD (MWF), NICM with severe LV dysfnxn (EF 19%), biotronic ICD, Seizure (off Keppra since 7/2017), L upper arm PICC with home dobutamine drip, afib on coumadin, COPD on 3-4L home o2, hld, DM, and hypotension on midodrine presenting with multiple complaints most likely due to the patients chronic low flow state from advance HF with reduced EF with a reported elevated INR on outpatient labs, which is now corrected.

## 2017-10-29 NOTE — H&P ADULT - PROBLEM SELECTOR PLAN 3
- Pt has history of chronic hypotension due to advance HF  - c/w Dobutamine drip at 7mcg/kg/min and midodrine 100mg TID  - Ambulation only with assistance

## 2017-10-29 NOTE — PROGRESS NOTE ADULT - PROBLEM SELECTOR PLAN 7
- hx of afib  - on coumadin for AC   - Current INR is 2.01  - Unsure if outpatient INR was correct with reading of 8  - Pt denies melena and FOBT was negative   - safe to resume AC, pt is on 2mg of coumadin at home currently will give stat dose now -c/w coumadin

## 2017-10-29 NOTE — H&P ADULT - NSHPREVIEWOFSYSTEMS_GEN_ALL_CORE
REVIEW OF SYSTEMS:  CONSTITUTIONAL: No fever, chills, night sweats, or fatigue  EYES: No eye pain, visual disturbances, or discharge  ENMT:  No difficulty hearing, tinnitus, vertigo; No sinus or throat pain  RESPIRATORY: No wheezing ; + for shortness of breath, cough and hemoptysis   CARDIOVASCULAR: No palpitations or leg swelling. + for chest pain and dizziness   GASTROINTESTINAL: No nausea, vomiting, or hematemesis; No diarrhea. No melena or hematochezia. + for constipation and abd. pain   GENITOURINARY: No dysuria, frequency, hematuria, or incontinence  NEUROLOGICAL: No headaches, memory loss, loss of strength, numbness, or tremors  ENDOCRINE: No heat or cold intolerance; No hair loss  MUSCULOSKELETAL: No joint pain or swelling; No muscle, back pain. + for LUE pain   PSYCHIATRIC: No depression, anxiety, mood swings, or difficulty sleeping  HEME/LYMPH: No easy bruising, or bleeding gums  ALLERY AND IMMUNOLOGIC: No hives or eczema

## 2017-10-29 NOTE — H&P ADULT - PROBLEM SELECTOR PLAN 1
- Pt complaining of episodic dizziness on presentation  - Similar episode occurred on previous admission, the patient had extensive work up which was negative for any new changes.   - Given the patient's low BP in the setting of severely reduced EF, dizziness most likely due to poor perfusion   - Continue with dobutamine infusion and midodrine 10mg TID   - CT head read pending, though stroke unlikely given no focal neurological deficits

## 2017-10-29 NOTE — H&P ADULT - NSHPSOCIALHISTORY_GEN_ALL_CORE
Social History:    Marital Status:  ( x  )    (   ) Single    (   )    (  )   Occupation: Disability   Lives with: (  ) alone  ( x ) children   ( x ) spouse   (  ) parents  (  ) other    Substance Use (street drugs): (  ) never used  (  ) other:  Tobacco Usage:  ( x  ) never smoked   (   ) former smoker   (   ) current smoker  (     ) pack year  (        ) last cigarette date

## 2017-10-29 NOTE — H&P ADULT - PROBLEM SELECTOR PLAN 7
- hx of afib  - on coumadin for AC   - Current INR is 2.01  - Unsure if outpatient INR was correct with reading of 8  - Pt denies melena and FOBT was negative   - safe to resume AC, pt is on 2mg of coumadin at home currently - hx of afib  - on coumadin for AC   - Current INR is 2.01  - Unsure if outpatient INR was correct with reading of 8  - Pt denies melena and FOBT was negative   - safe to resume AC, pt is on 2mg of coumadin at home currently will give stat dose now

## 2017-10-29 NOTE — H&P ADULT - PROBLEM SELECTOR PLAN 2
- pt c/o of episodic pressure like left sided chest pain   - pt had similar complaint on the last admission. Pt had elevated troponins to .36 secondary to HF and ESRD   - elevated troponin on this admission to .35  - continue to trend q6h x2, but ACS is unlikely given EKG showed NSR with no ST changes - pt c/o of episodic pressure like left sided chest pain   - pt had similar complaint on the last admission. Pt had elevated troponin to .36 secondary to HF and ESRD   - elevated troponin on this admission to .35  - continue to trend q6h x2, but ACS is unlikely given EKG showed NSR with no ST changes

## 2017-10-29 NOTE — H&P ADULT - PROBLEM SELECTOR PLAN 8
- Pt states has been having difficulty moving his bowels   - very hard and formed stools   - denies BRBPR and Melena   - Colace and adriana for constipation

## 2017-10-29 NOTE — PROGRESS NOTE ADULT - PROBLEM SELECTOR PLAN 1
- Pt complaining of episodic dizziness on presentation  - Similar episode occurred on previous admission, the patient had extensive work up which was negative for any new changes.   - Given the patient's low BP in the setting of severely reduced EF, dizziness most likely due to poor perfusion   - Continue with dobutamine infusion and midodrine 10mg TID   - CT head read pending, though stroke unlikely given no focal neurological deficits - Pt complaining of episodic dizziness on presentation  - Similar episode occurred on previous admission, the patient had extensive work up which was negative for any new changes.   - Given the patient's low BP in the setting of severely reduced EF, dizziness most likely due to poor perfusion   - Continue with dobutamine infusion and midodrine 10mg TID   - CT head neg

## 2017-10-29 NOTE — H&P ADULT - NSHPLABSRESULTS_GEN_ALL_CORE
The Labs were reviewed by me   The Radiology was reviewed by me    EKG tracing reviewed by me    10-28    140  |  99  |  29<H>  ----------------------------<  167<H>  4.1   |  26  |  4.53<H>    Ca    9.2      28 Oct 2017 19:00    TPro  8.4<H>  /  Alb  3.3  /  TBili  0.6  /  DBili  x   /  AST  27  /  ALT  19  /  AlkPhos  207<H>  10-28      PT/INR - ( 28 Oct 2017 19:00 )   PT: 22.8 SEC;   INR: 2.01          PTT - ( 28 Oct 2017 19:00 )  PTT:50.6 SEC                                        10.8   6.39  )-----------( 132      ( 28 Oct 2017 19:00 )             36.9 The Labs were reviewed by me   The Radiology was reviewed by me    EKG tracing reviewed by me    10-28    140  |  99  |  29<H>  ----------------------------<  167<H>  4.1   |  26  |  4.53<H>    Ca    9.2      28 Oct 2017 19:00    TPro  8.4<H>  /  Alb  3.3  /  TBili  0.6  /  DBili  x   /  AST  27  /  ALT  19  /  AlkPhos  207<H>  10-28      PT/INR - ( 28 Oct 2017 19:00 )   PT: 22.8 SEC;   INR: 2.01          PTT - ( 28 Oct 2017 19:00 )  PTT:50.6 SEC                                        10.8   6.39  )-----------( 132      ( 28 Oct 2017 19:00 )             36.9    Occult Blood: NEGATIVE: ** Results**  Positive Control = Positive  Negative Control = Negative (10.28.17 @ 19:15)    CARDIAC MARKERS ( 28 Oct 2017 19:00 )  x     / 0.36 ng/mL / 72 u/L / 3.71 ng/mL / x          Serum Pro-Brain Natriuretic Peptide: > 78095: ACUTE CONGESTIVE HEART FAILURE is UNLIKELY if NT-proBNP is < 300 pg/mL.    CONSIDER ACUTE CONGESTIVE HEART FAILURE if:    AGE                NT-proBNP RESULT  ---                -------------  < 50 YEARS      >  450 pg/mL  50 - 75 YEARS      >  900 pg/mL  > 75 YEARS      > 1800 pg/mL    All results require clinical correlation.  Consider obtaining a  baseline or "dry" NT-proBNP level when the patient is stabilized,  so that subsequent levels can be compared to that value.  Patients  with recurrent CHF may have elevated NT-proBNP levels.  Acute  failure episodes generally produce levels at least 25% greater  than baseline levels.  The above values are derived from a large  multi-center international study, "Kenyatta, CU[place], et al, European  Heart Journal, 2006; 27:330-337."   pg/mL (10.28.17 @ 19:00)      Radiology:    CXR: small right pleural effusion     EKG: NSR

## 2017-10-29 NOTE — H&P ADULT - HISTORY OF PRESENT ILLNESS
62 y.o. male with ESRD on HD (MWF), NICM with severe LV dysfnxn (EF 19%), biotronic ICD, Seizure (off Keppra since 7/2017), L upper arm PICC with home dobutamine drip, afib on coumadin, COPD on 3-4L home o2, hld, DM, and hypotension on midodrine presenting with dizziness and elevated INR on outpatient labs. The patient has multiple complaints on this presentation. The patient states he felt dizzy today, and felt he was tilted towards the left while walking. States the dizziness is episodic and does not persist for a long time. The patient was recently admitted for a similar complaint to [facility] on 10/15, and had a full work up done for near syncope but was negative for acute changes, main concern at that time was a TIA. The patient's also states when at HD clinic yesterday his INR was 8 on testing, and was told to hold his home dose of coumadin (pt states he is on 2mg of coumadin daily). Also complaining of episodic abdominal pain and difficulty with moving his bowels. States he feels his belly is "very hard" and is straining in order to push his stools. States his stools have been very dark but hard and well formed, denies BRBPR or melanotic stools. Also complaining of chest pain that has been episodic as well, located on the left side and is pressure like in quality, denies the pain radiates anywhere and has been having CP episodically now for a few days and is chronically SOB and is on home NC O2. The patient says he has been having pain in his left upper arm on movement and states the color of his PICC line has changed to purple which was concerning. The pain in the arm has persisted for the last 2 weeks (PICC line was placed 6 months ago), though the patient did not mention this on the previous admission. 62 y.o. male with ESRD on HD (MWF), NICM with severe LV dysfnxn (EF 21%), biotronic ICD, L upper arm PICC with home dobutamine drip, afib on coumadin, COPD on 3-4L home o2, hld, DM, and hypotension on midodrine presenting with dizziness and elevated INR on outpatient labs. The patient has multiple complaints on this presentation. The patient states he felt dizzy today, and felt he was tilted towards the left while walking. States the dizziness is episodic and does not persist for a long time. The patient was recently admitted for a similar complaint to [facility] on 10/15, and had a full work up done for near syncope but was negative for acute changes, main concern at that time was a TIA. The patient's also states when at HD clinic yesterday his INR was 8 on testing, and was told to hold his home dose of coumadin (pt states he is on 2mg of coumadin daily). Also complaining of episodic abdominal pain and difficulty with moving his bowels. States he feels his belly is "very hard" and is straining in order to push his stools. States his stools have been very dark but hard and well formed, denies BRBPR or melanotic stools. Also complaining of chest pain that has been episodic as well, located on the left side and is pressure like in quality, denies the pain radiates anywhere and has been having CP episodically now for a few days and is chronically SOB and is on home NC O2. The patient says he has been having pain in his left upper arm on movement and states the color of his PICC line has changed to purple which was concerning. The pain in the arm has persisted for the last 2 weeks (PICC line was placed 6 months ago), though the patient did not mention this on the previous admission.

## 2017-10-29 NOTE — PROGRESS NOTE ADULT - SUBJECTIVE AND OBJECTIVE BOX
Patient is a 62y old  Male who presents with a chief complaint of dizziness, Left ARM pain , Abdominal pain (29 Oct 2017 00:05)        SUBJECTIVE / OVERNIGHT EVENTS:    pt states his dizziness & abdominal pain are somewhat improved  denies cp  complains of sob/flores and fatigue      MEDICATIONS  (STANDING):  amiodarone    Tablet 200 milliGRAM(s) Oral daily  aspirin enteric coated 81 milliGRAM(s) Oral daily  atorvastatin 80 milliGRAM(s) Oral at bedtime  buDESOnide 160 MICROgram(s)/formoterol 4.5 MICROgram(s) Inhaler 2 Puff(s) Inhalation two times a day  DOBUTamine Infusion 7.494 MICROgram(s)/kG/Min (17.85 mL/Hr) IV Continuous <Continuous>  docusate sodium 100 milliGRAM(s) Oral three times a day  finasteride 5 milliGRAM(s) Oral daily  influenza   Vaccine 0.5 milliLiter(s) IntraMuscular once  insulin lispro (HumaLOG) corrective regimen sliding scale   SubCutaneous three times a day before meals  insulin lispro (HumaLOG) corrective regimen sliding scale   SubCutaneous at bedtime  levothyroxine 75 MICROGram(s) Oral daily  midodrine 10 milliGRAM(s) Oral every 8 hours  sevelamer hydrochloride 800 milliGRAM(s) Oral three times a day with meals  warfarin 2 milliGRAM(s) Oral once    MEDICATIONS  (PRN):  ondansetron Injectable 4 milliGRAM(s) IV Push every 6 hours PRN Nausea and/or Vomiting  ondansetron Injectable 4 milliGRAM(s) IV Push every 8 hours PRN Nausea and/or Vomiting  senna 2 Tablet(s) Oral at bedtime PRN Constipation      Vital Signs Last 24 Hrs  T(C): 36.8 (29 Oct 2017 13:42), Max: 36.8 (28 Oct 2017 23:47)  T(F): 98.2 (29 Oct 2017 13:42), Max: 98.3 (28 Oct 2017 23:47)  HR: 73 (29 Oct 2017 13:42) (73 - 83)  BP: 75/42 (29 Oct 2017 13:42) (75/42 - 114/71)  BP(mean): --  RR: 18 (29 Oct 2017 13:42) (16 - 22)  SpO2: 96% (29 Oct 2017 13:42) (89% - 100%)  CAPILLARY BLOOD GLUCOSE      POCT Blood Glucose.: 111 mg/dL (29 Oct 2017 12:05)  POCT Blood Glucose.: 89 mg/dL (29 Oct 2017 08:32)    I&O's Summary        PHYSICAL EXAM  GENERAL: NAD  CHEST/LUNG: Clear to auscultation bilaterally; No wheeze  HEART: Regular rate and rhythm; No murmurs, rubs, or gallops  ABDOMEN: Soft, Nontender, Nondistended; Bowel sounds present  EXTREMITIES:  2+ Peripheral Pulses, No clubbing, cyanosis, or edema  SKIN: R chest wall HD cath, LUE PICC c/d/i     LABS:                        10.7   5.69  )-----------( 113      ( 29 Oct 2017 06:10 )             35.5     10-29    139  |  99  |  34<H>  ----------------------------<  86  4.3   |  25  |  5.19<H>    Ca    8.9      29 Oct 2017 06:10  Phos  2.5     10-29  Mg     2.1     10-29    TPro  8.4<H>  /  Alb  3.3  /  TBili  0.6  /  DBili  x   /  AST  27  /  ALT  19  /  AlkPhos  207<H>  10-28    PT/INR - ( 29 Oct 2017 06:10 )   PT: 19.3 SEC;   INR: 1.70          PTT - ( 28 Oct 2017 19:00 )  PTT:50.6 SEC  CARDIAC MARKERS ( 29 Oct 2017 06:10 )  x     / 0.36 ng/mL / x     / x     / x      CARDIAC MARKERS ( 29 Oct 2017 01:25 )  x     / 0.35 ng/mL / 66 u/L / 3.41 ng/mL / x      CARDIAC MARKERS ( 28 Oct 2017 19:00 )  x     / 0.36 ng/mL / 72 u/L / 3.71 ng/mL / x              RADIOLOGY & ADDITIONAL TESTS:    Imaging Personally Reviewed:  Consultant(s) Notes Reviewed:    Care Discussed with Consultants/Other Providers: Patient is a 62y old  Male who presents with a chief complaint of dizziness, Left ARM pain , Abdominal pain (29 Oct 2017 00:05)        SUBJECTIVE / OVERNIGHT EVENTS:    pt states his dizziness & abdominal pain are improved  denies cp  complains of sob/flores and fatigue      MEDICATIONS  (STANDING):  amiodarone    Tablet 200 milliGRAM(s) Oral daily  aspirin enteric coated 81 milliGRAM(s) Oral daily  atorvastatin 80 milliGRAM(s) Oral at bedtime  buDESOnide 160 MICROgram(s)/formoterol 4.5 MICROgram(s) Inhaler 2 Puff(s) Inhalation two times a day  DOBUTamine Infusion 7.494 MICROgram(s)/kG/Min (17.85 mL/Hr) IV Continuous <Continuous>  docusate sodium 100 milliGRAM(s) Oral three times a day  finasteride 5 milliGRAM(s) Oral daily  influenza   Vaccine 0.5 milliLiter(s) IntraMuscular once  insulin lispro (HumaLOG) corrective regimen sliding scale   SubCutaneous three times a day before meals  insulin lispro (HumaLOG) corrective regimen sliding scale   SubCutaneous at bedtime  levothyroxine 75 MICROGram(s) Oral daily  midodrine 10 milliGRAM(s) Oral every 8 hours  sevelamer hydrochloride 800 milliGRAM(s) Oral three times a day with meals  warfarin 2 milliGRAM(s) Oral once    MEDICATIONS  (PRN):  ondansetron Injectable 4 milliGRAM(s) IV Push every 6 hours PRN Nausea and/or Vomiting  ondansetron Injectable 4 milliGRAM(s) IV Push every 8 hours PRN Nausea and/or Vomiting  senna 2 Tablet(s) Oral at bedtime PRN Constipation      Vital Signs Last 24 Hrs  T(C): 36.8 (29 Oct 2017 13:42), Max: 36.8 (28 Oct 2017 23:47)  T(F): 98.2 (29 Oct 2017 13:42), Max: 98.3 (28 Oct 2017 23:47)  HR: 73 (29 Oct 2017 13:42) (73 - 83)  BP: 75/42 (29 Oct 2017 13:42) (75/42 - 114/71)  BP(mean): --  RR: 18 (29 Oct 2017 13:42) (16 - 22)  SpO2: 96% (29 Oct 2017 13:42) (89% - 100%)  CAPILLARY BLOOD GLUCOSE      POCT Blood Glucose.: 111 mg/dL (29 Oct 2017 12:05)  POCT Blood Glucose.: 89 mg/dL (29 Oct 2017 08:32)    I&O's Summary        PHYSICAL EXAM  GENERAL: NAD  CHEST/LUNG: Clear to auscultation bilaterally; No wheeze  HEART: Regular rate and rhythm; No murmurs, rubs, or gallops  ABDOMEN: Soft, Nontender, Nondistended; Bowel sounds present  EXTREMITIES:  2+ Peripheral Pulses, No clubbing, cyanosis, or edema  SKIN: R chest wall HD cath, LUE PICC c/d/i     LABS:                        10.7   5.69  )-----------( 113      ( 29 Oct 2017 06:10 )             35.5     10-29    139  |  99  |  34<H>  ----------------------------<  86  4.3   |  25  |  5.19<H>    Ca    8.9      29 Oct 2017 06:10  Phos  2.5     10-29  Mg     2.1     10-29    TPro  8.4<H>  /  Alb  3.3  /  TBili  0.6  /  DBili  x   /  AST  27  /  ALT  19  /  AlkPhos  207<H>  10-28    PT/INR - ( 29 Oct 2017 06:10 )   PT: 19.3 SEC;   INR: 1.70          PTT - ( 28 Oct 2017 19:00 )  PTT:50.6 SEC  CARDIAC MARKERS ( 29 Oct 2017 06:10 )  x     / 0.36 ng/mL / x     / x     / x      CARDIAC MARKERS ( 29 Oct 2017 01:25 )  x     / 0.35 ng/mL / 66 u/L / 3.41 ng/mL / x      CARDIAC MARKERS ( 28 Oct 2017 19:00 )  x     / 0.36 ng/mL / 72 u/L / 3.71 ng/mL / x              RADIOLOGY & ADDITIONAL TESTS:    Imaging Personally Reviewed:  Consultant(s) Notes Reviewed:    Care Discussed with Consultants/Other Providers:

## 2017-10-29 NOTE — PROGRESS NOTE ADULT - PROBLEM SELECTOR PLAN 2
- pt c/o of episodic pressure like left sided chest pain   - pt had similar complaint on the last admission. Pt had elevated troponin to .36 secondary to HF and ESRD   - elevated troponin on this admission to .35  - continue to trend q6h x2, but ACS is unlikely given EKG showed NSR with no ST changes - pt had similar complaint on the last admission. Pt had elevated troponin to .36 secondary to HF and ESRD   - elevated troponin on this admission to .35 - .36, stable, CK and CKMB are not elevated, no evidence of ACS

## 2017-10-29 NOTE — PROGRESS NOTE ADULT - PROBLEM SELECTOR PLAN 5
- TTE from last admission on 10/18 showed EF of 21% with severe global LV dysfunction  - c/w Dobutamine drip at 7mcg/kg/min  - Consult Cardiology (Dr. Davis) in the AM - TTE from last admission on 10/18 showed EF of 21% with severe global LV dysfunction  - c/w Dobutamine drip at 7mcg/kg/min  - Cards consult noted, will c/s CHF team

## 2017-10-30 DIAGNOSIS — I50.23 ACUTE ON CHRONIC SYSTOLIC (CONGESTIVE) HEART FAILURE: ICD-10-CM

## 2017-10-30 DIAGNOSIS — J84.9 INTERSTITIAL PULMONARY DISEASE, UNSPECIFIED: ICD-10-CM

## 2017-10-30 DIAGNOSIS — E83.39 OTHER DISORDERS OF PHOSPHORUS METABOLISM: ICD-10-CM

## 2017-10-30 LAB
ALBUMIN SERPL ELPH-MCNC: 3 G/DL — LOW (ref 3.3–5)
ALP SERPL-CCNC: 172 U/L — HIGH (ref 40–120)
ALT FLD-CCNC: 18 U/L — SIGNIFICANT CHANGE UP (ref 4–41)
AST SERPL-CCNC: 22 U/L — SIGNIFICANT CHANGE UP (ref 4–40)
BASOPHILS # BLD AUTO: 0.03 K/UL — SIGNIFICANT CHANGE UP (ref 0–0.2)
BASOPHILS NFR BLD AUTO: 0.5 % — SIGNIFICANT CHANGE UP (ref 0–2)
BILIRUB SERPL-MCNC: 0.5 MG/DL — SIGNIFICANT CHANGE UP (ref 0.2–1.2)
BUN SERPL-MCNC: 47 MG/DL — HIGH (ref 7–23)
CALCIUM SERPL-MCNC: 9.2 MG/DL — SIGNIFICANT CHANGE UP (ref 8.4–10.5)
CHLORIDE SERPL-SCNC: 98 MMOL/L — SIGNIFICANT CHANGE UP (ref 98–107)
CK SERPL-CCNC: 47 U/L — SIGNIFICANT CHANGE UP (ref 30–200)
CO2 SERPL-SCNC: 24 MMOL/L — SIGNIFICANT CHANGE UP (ref 22–31)
CREAT SERPL-MCNC: 6.38 MG/DL — HIGH (ref 0.5–1.3)
EOSINOPHIL # BLD AUTO: 0.19 K/UL — SIGNIFICANT CHANGE UP (ref 0–0.5)
EOSINOPHIL NFR BLD AUTO: 3.3 % — SIGNIFICANT CHANGE UP (ref 0–6)
FRUCTOSAMINE SERPL-MCNC: 322 UMOL/L — HIGH (ref 205–285)
GLUCOSE BLDC GLUCOMTR-MCNC: 106 MG/DL — HIGH (ref 70–99)
GLUCOSE BLDC GLUCOMTR-MCNC: 115 MG/DL — HIGH (ref 70–99)
GLUCOSE BLDC GLUCOMTR-MCNC: 207 MG/DL — HIGH (ref 70–99)
GLUCOSE BLDC GLUCOMTR-MCNC: 209 MG/DL — HIGH (ref 70–99)
GLUCOSE SERPL-MCNC: 110 MG/DL — HIGH (ref 70–99)
HAV IGM SER-ACNC: NONREACTIVE — SIGNIFICANT CHANGE UP
HBA1C BLD-MCNC: 6.3 % — HIGH (ref 4–5.6)
HBV CORE IGM SER-ACNC: NONREACTIVE — SIGNIFICANT CHANGE UP
HBV SURFACE AG SER-ACNC: NONREACTIVE — SIGNIFICANT CHANGE UP
HCT VFR BLD CALC: 35 % — LOW (ref 39–50)
HCV AB S/CO SERPL IA: 0.14 S/CO — SIGNIFICANT CHANGE UP
HCV AB SERPL-IMP: SIGNIFICANT CHANGE UP
HGB BLD-MCNC: 10.6 G/DL — LOW (ref 13–17)
IMM GRANULOCYTES # BLD AUTO: 0.01 # — SIGNIFICANT CHANGE UP
IMM GRANULOCYTES NFR BLD AUTO: 0.2 % — SIGNIFICANT CHANGE UP (ref 0–1.5)
INR BLD: 1.76 — HIGH (ref 0.88–1.17)
LYMPHOCYTES # BLD AUTO: 0.97 K/UL — LOW (ref 1–3.3)
LYMPHOCYTES # BLD AUTO: 16.9 % — SIGNIFICANT CHANGE UP (ref 13–44)
MAGNESIUM SERPL-MCNC: 2.3 MG/DL — SIGNIFICANT CHANGE UP (ref 1.6–2.6)
MCHC RBC-ENTMCNC: 29.4 PG — SIGNIFICANT CHANGE UP (ref 27–34)
MCHC RBC-ENTMCNC: 30.3 % — LOW (ref 32–36)
MCV RBC AUTO: 97.2 FL — SIGNIFICANT CHANGE UP (ref 80–100)
MONOCYTES # BLD AUTO: 0.66 K/UL — SIGNIFICANT CHANGE UP (ref 0–0.9)
MONOCYTES NFR BLD AUTO: 11.5 % — SIGNIFICANT CHANGE UP (ref 2–14)
NEUTROPHILS # BLD AUTO: 3.88 K/UL — SIGNIFICANT CHANGE UP (ref 1.8–7.4)
NEUTROPHILS NFR BLD AUTO: 67.6 % — SIGNIFICANT CHANGE UP (ref 43–77)
NRBC # FLD: 0 — SIGNIFICANT CHANGE UP
PHOSPHATE SERPL-MCNC: 3.6 MG/DL — SIGNIFICANT CHANGE UP (ref 2.5–4.5)
PLATELET # BLD AUTO: 104 K/UL — LOW (ref 150–400)
PMV BLD: 12.5 FL — SIGNIFICANT CHANGE UP (ref 7–13)
POTASSIUM SERPL-MCNC: 5 MMOL/L — SIGNIFICANT CHANGE UP (ref 3.5–5.3)
POTASSIUM SERPL-SCNC: 5 MMOL/L — SIGNIFICANT CHANGE UP (ref 3.5–5.3)
PROT SERPL-MCNC: 7.8 G/DL — SIGNIFICANT CHANGE UP (ref 6–8.3)
PROTHROM AB SERPL-ACNC: 19.9 SEC — HIGH (ref 9.8–13.1)
RBC # BLD: 3.6 M/UL — LOW (ref 4.2–5.8)
RBC # FLD: 18.4 % — HIGH (ref 10.3–14.5)
SODIUM SERPL-SCNC: 137 MMOL/L — SIGNIFICANT CHANGE UP (ref 135–145)
T4 FREE SERPL-MCNC: 0.96 NG/DL — SIGNIFICANT CHANGE UP (ref 0.9–1.8)
TROPONIN T SERPL-MCNC: 0.33 NG/ML — HIGH (ref 0–0.06)
TSH SERPL-MCNC: 6.05 UIU/ML — HIGH (ref 0.27–4.2)
WBC # BLD: 5.74 K/UL — SIGNIFICANT CHANGE UP (ref 3.8–10.5)
WBC # FLD AUTO: 5.74 K/UL — SIGNIFICANT CHANGE UP (ref 3.8–10.5)

## 2017-10-30 PROCEDURE — 99233 SBSQ HOSP IP/OBS HIGH 50: CPT

## 2017-10-30 PROCEDURE — 74176 CT ABD & PELVIS W/O CONTRAST: CPT | Mod: 26

## 2017-10-30 PROCEDURE — 99255 IP/OBS CONSLTJ NEW/EST HI 80: CPT

## 2017-10-30 RX ORDER — WARFARIN SODIUM 2.5 MG/1
2 TABLET ORAL ONCE
Qty: 0 | Refills: 0 | Status: DISCONTINUED | OUTPATIENT
Start: 2017-10-30 | End: 2017-10-30

## 2017-10-30 RX ADMIN — MIDODRINE HYDROCHLORIDE 10 MILLIGRAM(S): 2.5 TABLET ORAL at 19:59

## 2017-10-30 RX ADMIN — POLYETHYLENE GLYCOL 3350 17 GRAM(S): 17 POWDER, FOR SOLUTION ORAL at 13:18

## 2017-10-30 RX ADMIN — BUDESONIDE AND FORMOTEROL FUMARATE DIHYDRATE 2 PUFF(S): 160; 4.5 AEROSOL RESPIRATORY (INHALATION) at 09:38

## 2017-10-30 RX ADMIN — SEVELAMER CARBONATE 800 MILLIGRAM(S): 2400 POWDER, FOR SUSPENSION ORAL at 09:38

## 2017-10-30 RX ADMIN — Medication 100 MILLIGRAM(S): at 05:19

## 2017-10-30 RX ADMIN — Medication 81 MILLIGRAM(S): at 13:17

## 2017-10-30 RX ADMIN — Medication 100 MILLIGRAM(S): at 13:17

## 2017-10-30 RX ADMIN — Medication 2: at 13:17

## 2017-10-30 RX ADMIN — FINASTERIDE 5 MILLIGRAM(S): 5 TABLET, FILM COATED ORAL at 13:17

## 2017-10-30 RX ADMIN — AMIODARONE HYDROCHLORIDE 200 MILLIGRAM(S): 400 TABLET ORAL at 05:19

## 2017-10-30 RX ADMIN — SEVELAMER CARBONATE 800 MILLIGRAM(S): 2400 POWDER, FOR SUSPENSION ORAL at 13:16

## 2017-10-30 RX ADMIN — MIDODRINE HYDROCHLORIDE 10 MILLIGRAM(S): 2.5 TABLET ORAL at 05:19

## 2017-10-30 RX ADMIN — MIDODRINE HYDROCHLORIDE 10 MILLIGRAM(S): 2.5 TABLET ORAL at 13:17

## 2017-10-30 RX ADMIN — SEVELAMER CARBONATE 800 MILLIGRAM(S): 2400 POWDER, FOR SUSPENSION ORAL at 17:49

## 2017-10-30 RX ADMIN — Medication 2: at 17:49

## 2017-10-30 RX ADMIN — Medication 75 MICROGRAM(S): at 05:19

## 2017-10-30 NOTE — CONSULT NOTE ADULT - ASSESSMENT
61 YO male with ESRD on HD (MWF), NICM with severe LV dysfnxn (EF 21%), biotronic ICD, on home dobutamine drip, Afib on coumadin, COPD on 3-4L home o2, DM a/w SOB, associated with N/V, r/o ACS. Renal consult for HD management.
#End stage NICM ef 10% on home dobutamine drip. Elevated troponin non specific in setting esrd.  #ICD  #ESRD on HD  #PAF  #Pulmonary fibrosis  Prognosis poor. Please have pulmonary/renal, CHF teams  resee, and suggest neurologic opinion.  Will follow with you.
63 y/o male w/ PMHX of NICM?, HFrEF (LVEF 21%, LVIDd 6.2 cm, w/ AICD) on chronic home dobutamine 7.4 mcg/kg/min,  ESRD on HD, HLD, DM II, a.fib on coumadin, interstitial pulmonary lung disease on chronic home O2, hypotension on midodrine comes in with complaints of dizziness and SOB. He has been admitted 6 times this year for various reasons, but mostly for SOB. He admits to [facility] after a few steps, fatigue and dizziness which he currently still has.  [facility] 5/16/17 on 2.5 mcg/kg/min of dobutamine showed RA 25, PCWP 25, PA 61/29/39, PA sat 42.8%, CI 1.65, CO 3.60, PVR 3.84.     He is a non-smoker, no ETOH or drug abuse. Was not exposed to any toxic chemicals in past.       Has NYHA class IV symptoms.

## 2017-10-30 NOTE — PROGRESS NOTE ADULT - PROBLEM SELECTOR PLAN 5
- TTE from last admission on 10/18 showed EF of 21% with severe global LV dysfunction  - c/w Dobutamine drip at 7mcg/kg/min  - Cards consult noted, will c/s CHF team

## 2017-10-30 NOTE — PROGRESS NOTE ADULT - SUBJECTIVE AND OBJECTIVE BOX
SUBJECTIVE: Denies shortness of breath this AM  	  MEDICATIONS:  amiodarone    Tablet 200 milliGRAM(s) Oral daily  DOBUTamine Infusion 7.494 MICROgram(s)/kG/Min IV Continuous <Continuous>  midodrine 10 milliGRAM(s) Oral every 8 hours  buDESOnide 160 MICROgram(s)/formoterol 4.5 MICROgram(s) Inhaler 2 Puff(s) Inhalation two times a day  ondansetron Injectable 4 milliGRAM(s) IV Push every 6 hours PRN  ondansetron Injectable 4 milliGRAM(s) IV Push every 8 hours PRN  docusate sodium 100 milliGRAM(s) Oral three times a day  polyethylene glycol 3350 17 Gram(s) Oral daily  senna 2 Tablet(s) Oral at bedtime PRN  atorvastatin 80 milliGRAM(s) Oral at bedtime  finasteride 5 milliGRAM(s) Oral daily  insulin lispro (HumaLOG) corrective regimen sliding scale   SubCutaneous three times a day before meals  insulin lispro (HumaLOG) corrective regimen sliding scale   SubCutaneous at bedtime  levothyroxine 75 MICROGram(s) Oral daily  aspirin enteric coated 81 milliGRAM(s) Oral daily  influenza   Vaccine 0.5 milliLiter(s) IntraMuscular once      REVIEW OF SYSTEMS:    CONSTITUTIONAL: No fever, weight loss, or fatigue  EYES: No eye pain, visual disturbances, or discharge  NECK: No pain or stiffness  RESPIRATORY: No cough, wheezing, chills or hemoptysis; No Shortness of Breath  CARDIOVASCULAR: No chest pain, palpitations, dizziness, or leg swelling  GASTROINTESTINAL: No abdominal or epigastric pain. No nausea, vomiting, or hematemesis; No diarrhea or constipation. No melena or hematochezia.  GENITOURINARY: No dysuria, frequency, hematuria, or incontinence  NEUROLOGICAL: No headaches, memory loss, loss of strength, numbness, or tremors  SKIN: No itching, burning, rashes, or lesions   LYMPH Nodes: No enlarged glands  MUSCULOSKELETAL: No joint pain or swelling; No muscle, back, or extremity pain  All other review of systems are negative.  	  [ ] Unable to obtain    PHYSICAL EXAM:  T(C): 36.5 (10-30-17 @ 05:17), Max: 36.8 (10-29-17 @ 13:42)  HR: 74 (10-30-17 @ 05:17) (73 - 76)  BP: 92/50 (10-30-17 @ 05:17) (75/42 - 92/50)  RR: 18 (10-30-17 @ 05:17) (18 - 18)  SpO2: 95% (10-30-17 @ 05:17) (95% - 96%)  Wt(kg): --  I&O's Summary        PHYSICAL EXAM    Appearance: Normal	  HEENT:   Normal oral mucosa, PERRL, EOMI	  NECK: Soft and supple, No LAD, No JVD  Cardiovascular: Regular Rate and Rhythm, Normal S1 S2, No murmurs, No clicks, gallops or rubs  Respiratory: Lungs clear to auscultation	  Gastrointestinal:  Soft, Non-tender, + BS	  Skin: No rashes, No ecchymoses, No cyanosis  Neurologic: Non-focal  Extremities: 2+ edema  Vascular: Peripheral pulses palpable 2+ bilaterally    TELEMETRY: 	Normal Sinus Rhythm    ECG:  	  RADIOLOGY  DIAGNOSTIC TESTING:  [ ] Echocardiogram:  [ ] Catheterization:  [ ] Stress Test:    OTHER: 	    LABS:	 	                            10.6   5.74  )-----------( 104      ( 30 Oct 2017 05:35 )             35.0     10-30    137  |  98  |  47<H>  ----------------------------<  110<H>  5.0   |  24  |  6.38<H>    Ca    9.2      30 Oct 2017 05:35  Phos  3.6     10-30  Mg     2.3     10-30    TPro  7.8  /  Alb  3.0<L>  /  TBili  0.5  /  DBili  x   /  AST  22  /  ALT  18  /  AlkPhos  172<H>  10-30    proBNP:   Lipid Profile:   HgA1c: Hemoglobin A1C, Whole Blood: 6.3 % (10-30 @ 05:35)    TSH: Thyroid Stimulating Hormone, Serum: 6.05 uIU/mL (10-30 @ 05:35)

## 2017-10-30 NOTE — PROGRESS NOTE ADULT - PROBLEM SELECTOR PLAN 1
Maintenance HD schedule MWF.  Electrolytes acceptable.   HD today, maximize UF goal to optimize fluid status, as BP tolerates.  Cont midodrine with HD.

## 2017-10-30 NOTE — PROGRESS NOTE ADULT - SUBJECTIVE AND OBJECTIVE BOX
Patient is a 62y old  Male who presents with a chief complaint of dizziness, Left ARM pain , Abdominal pain (29 Oct 2017 00:05)      SUBJECTIVE / OVERNIGHT EVENTS: Pt was seen and examined at 1135am, No overnight events, tele with pvcs otherwise no issues, pt c/o having constipation and lower abdominal pain, passing flatus, no nausea or vomiting, has mild sob, no chest pain, still with left arm pain, no other complaints.    MEDICATIONS  (STANDING):  amiodarone    Tablet 200 milliGRAM(s) Oral daily  aspirin enteric coated 81 milliGRAM(s) Oral daily  atorvastatin 80 milliGRAM(s) Oral at bedtime  buDESOnide 160 MICROgram(s)/formoterol 4.5 MICROgram(s) Inhaler 2 Puff(s) Inhalation two times a day  DOBUTamine Infusion 7.494 MICROgram(s)/kG/Min (17.85 mL/Hr) IV Continuous <Continuous>  docusate sodium 100 milliGRAM(s) Oral three times a day  finasteride 5 milliGRAM(s) Oral daily  influenza   Vaccine 0.5 milliLiter(s) IntraMuscular once  insulin lispro (HumaLOG) corrective regimen sliding scale   SubCutaneous three times a day before meals  insulin lispro (HumaLOG) corrective regimen sliding scale   SubCutaneous at bedtime  levothyroxine 75 MICROGram(s) Oral daily  midodrine 10 milliGRAM(s) Oral every 8 hours  polyethylene glycol 3350 17 Gram(s) Oral daily  sevelamer hydrochloride 800 milliGRAM(s) Oral three times a day with meals  warfarin 2 milliGRAM(s) Oral once    MEDICATIONS  (PRN):  ondansetron Injectable 4 milliGRAM(s) IV Push every 6 hours PRN Nausea and/or Vomiting  ondansetron Injectable 4 milliGRAM(s) IV Push every 8 hours PRN Nausea and/or Vomiting  senna 2 Tablet(s) Oral at bedtime PRN Constipation      Vital Signs Last 24 Hrs  T(C): 36.6 (30 Oct 2017 13:21), Max: 36.6 (30 Oct 2017 13:21)  T(F): 97.9 (30 Oct 2017 13:21), Max: 97.9 (30 Oct 2017 13:21)  HR: 73 (30 Oct 2017 13:21) (73 - 76)  BP: 90/55 (30 Oct 2017 13:21) (90/48 - 92/50)  BP(mean): --  RR: 18 (30 Oct 2017 13:21) (18 - 18)  SpO2: 95% (30 Oct 2017 13:21) (95% - 95%)  CAPILLARY BLOOD GLUCOSE  191 (29 Oct 2017 22:19)  171 (29 Oct 2017 17:14)      POCT Blood Glucose.: 207 mg/dL (30 Oct 2017 11:47)  POCT Blood Glucose.: 106 mg/dL (30 Oct 2017 08:27)    I&O's Summary      PHYSICAL EXAM:  GENERAL: NAD, well-developed  CHEST/LUNG: Clear to auscultation bilaterally; No wheeze  HEART: Regular rate and rhythm  ABDOMEN: Soft, mild tenderness to lower abdomen, Nondistended  EXTREMITIES:  No LE edema, left arm picc in place, minimally tender at the site, no edema or erythema  PSYCH: calm  NEUROLOGY:AAOx3   SKIN: No rashes or lesions    LABS:                        10.6   5.74  )-----------( 104      ( 30 Oct 2017 05:35 )             35.0     10-30    137  |  98  |  47<H>  ----------------------------<  110<H>  5.0   |  24  |  6.38<H>    Ca    9.2      30 Oct 2017 05:35  Phos  3.6     10-30  Mg     2.3     10-30    TPro  7.8  /  Alb  3.0<L>  /  TBili  0.5  /  DBili  x   /  AST  22  /  ALT  18  /  AlkPhos  172<H>  10-30    PT/INR - ( 30 Oct 2017 05:35 )   PT: 19.9 SEC;   INR: 1.76          PTT - ( 28 Oct 2017 19:00 )  PTT:50.6 SEC  CARDIAC MARKERS ( 30 Oct 2017 05:35 )  x     / 0.33 ng/mL / 47 u/L / x     / x      CARDIAC MARKERS ( 29 Oct 2017 06:10 )  x     / 0.36 ng/mL / x     / x     / x      CARDIAC MARKERS ( 29 Oct 2017 01:25 )  x     / 0.35 ng/mL / 66 u/L / 3.41 ng/mL / x      CARDIAC MARKERS ( 28 Oct 2017 19:00 )  x     / 0.36 ng/mL / 72 u/L / 3.71 ng/mL / x              RADIOLOGY & ADDITIONAL TESTS:    Imaging Personally Reviewed:    Consultant(s) Notes Reviewed:      Care Discussed with Consultants/Other Providers:

## 2017-10-30 NOTE — CONSULT NOTE ADULT - SUBJECTIVE AND OBJECTIVE BOX
[facility] Consult Note Nephrology - CONSULTATION NOTE    Patient is a 62y Male with ESRD on HD MWF, NICM with severe LV dysfnxn (EF 21%), s/p biotronic ICD, on home dobutamine drip, Afib on coumadin, COPD on 3-4L home O2, DM, and chronic hypotension on midodrine admitted with SOB while ambulating on day of admission. Pt reports dyspnea associated with nausea and vomiting, and midsternal chest pain. He has noticed increasingly worsening HAYWOOD for past couple days. +constipation. Denies LE swelling, F/C, cough, abd pain. Pt had last HD on 10/27, full session, without notable event. No recent change in medications.   Currently, pt feeling improved. Denies chest pain currently, or SOB at rest.     PAST MEDICAL & SURGICAL HISTORY:  Seizure: Off Keppra since 7/2017  Chronic hypotension  AF (atrial fibrillation)  COPD (chronic obstructive pulmonary disease): 4L home O2  HLD (hyperlipidemia)  DM (diabetes mellitus): Off Insulin since 7/2017  ESRD (end stage renal disease) on dialysis  BPH (benign prostatic hypertrophy)  Myocardial infarction: 10/2011  Gout  CHF (congestive heart failure)  AICD (automatic cardioverter/defibrillator) present: Biotronic - placed 9/11/09  H/O coronary angiogram    Allergies  No Known Allergies    Home Medications Reviewed  Hospital Medications:   MEDICATIONS  (STANDING):  amiodarone    Tablet 200 milliGRAM(s) Oral daily  aspirin enteric coated 81 milliGRAM(s) Oral daily  atorvastatin 80 milliGRAM(s) Oral at bedtime  buDESOnide 160 MICROgram(s)/formoterol 4.5 MICROgram(s) Inhaler 2 Puff(s) Inhalation two times a day  DOBUTamine Infusion 7.494 MICROgram(s)/kG/Min (17.85 mL/Hr) IV Continuous <Continuous>  docusate sodium 100 milliGRAM(s) Oral three times a day  finasteride 5 milliGRAM(s) Oral daily  influenza   Vaccine 0.5 milliLiter(s) IntraMuscular once  insulin lispro (HumaLOG) corrective regimen sliding scale   SubCutaneous three times a day before meals  insulin lispro (HumaLOG) corrective regimen sliding scale   SubCutaneous at bedtime  levothyroxine 75 MICROGram(s) Oral daily  midodrine 10 milliGRAM(s) Oral every 8 hours  sevelamer hydrochloride 800 milliGRAM(s) Oral three times a day with meals  warfarin 2 milliGRAM(s) Oral once    SOCIAL HISTORY:  Denies ETOh,Smoking,   FAMILY HISTORY:  No pertinent family history in first degree relatives    REVIEW OF SYSTEMS:  CONSTITUTIONAL: No weakness, fevers or chills  EYES/ENT: No visual changes;  No vertigo or throat pain   NECK: No pain or stiffness  RESPIRATORY: No cough, wheezing, hemoptysis; +shortness of breath  CARDIOVASCULAR: +chest pain, denies palpitations.  GASTROINTESTINAL: No abdominal or epigastric pain. +nausea, vomiting, denies hematemesis; No diarrhea. +constipation. No melena or hematochezia.  GENITOURINARY: No dysuria, frequency, foamy urine, urinary urgency, incontinence or hematuria  NEUROLOGICAL: No numbness or weakness  SKIN: No itching, burning, rashes, or lesions   VASCULAR: No bilateral lower extremity edema.   All other review of systems is negative unless indicated above.    VITALS:  T(F): 98 (10-29-17 @ 05:19), Max: 98.3 (10-28-17 @ 23:47)  HR: 74 (10-29-17 @ 08:53)  BP: 91/58 (10-29-17 @ 08:53)  RR: 18 (10-29-17 @ 08:53)  SpO2: 96% (10-29-17 @ 08:53)  Wt(kg): --      Weight (kg): 79.1 (10-29 @ 07:07)  PHYSICAL EXAM:  Constitutional: NAD  HEENT: anicteric sclera, oropharynx clear, MMM  Neck: No JVD  Respiratory: Minimal bibasilar crackles.   Cardiovascular: S1, S2, RRR  Gastrointestinal: BS+, soft, NT/ND  Extremities: No cyanosis or clubbing. No peripheral edema  Neurological: A/O x 3, no focal deficits  Psychiatric: Normal mood, normal affect  : No CVA tenderness. No rosa.   Skin: No rashes  Vascular Access: RIJ tunneled cath     LABS:  10-29    139  |  99  |  34<H>  ----------------------------<  86  4.3   |  25  |  5.19<H>    Ca    8.9      29 Oct 2017 06:10  Phos  2.5     10-29  Mg     2.1     10-29    TPro  8.4<H>  /  Alb  3.3  /  TBili  0.6  /  DBili      /  AST  27  /  ALT  19  /  AlkPhos  207<H>  10-28    Creatinine Trend: 5.19 <--, 4.53 <--                        10.7   5.69  )-----------( 113      ( 29 Oct 2017 06:10 )             35.5     Urine Studies:      RADIOLOGY & ADDITIONAL STUDIES:    < from: Xray Chest 1 View AP- PORTABLE-Urgent (10.28.17 @ 23:04) >  IMPRESSION:  Small right pleural effusion.  Bilateral reticular opacities, unchanged from prior study.
CHIEF COMPLAINT: weak, dizzy, left arm pain    HPI:  62 y.o. male well known to our group, best to Dr Davis, known severe end stage dilated NICM,  maintained on home dobutamine drip,  EF by most recent echo 10%, biotronic icd, s/p multiple recent hospitalizations at  and [facility]. +PAF on coumadin, ESRD on HD  (renal = Dr Barrios), known pulmonary fibrosis using home O2, DM, readmitted with dizziness and left arm pain. No chest discomfort. Lethargic. +History seizures. Elevated troponin in setting esrd non specific.    PAST MEDICAL & SURGICAL HISTORY:  Seizure: Off Keppra since 7/2017  Chronic hypotension  AF (atrial fibrillation)  COPD (chronic obstructive pulmonary disease): 4L home O2  HLD (hyperlipidemia)  DM (diabetes mellitus): Off Insulin since 7/2017  ESRD (end stage renal disease) on dialysis  BPH (benign prostatic hypertrophy)  Myocardial infarction: 10/2011  Gout  CHF (congestive heart failure)  AICD (automatic cardioverter/defibrillator) present: Biotronic - placed 9/11/09  H/O coronary angiogram    No Known Allergies      FAMILY HISTORY:  No pertinent family history in first degree relatives      MEDICATIONS:  amiodarone    Tablet 200 milliGRAM(s) Oral daily  aspirin enteric coated 81 milliGRAM(s) Oral daily  atorvastatin 80 milliGRAM(s) Oral at bedtime  buDESOnide 160 MICROgram(s)/formoterol 4.5 MICROgram(s) Inhaler 2 Puff(s) Inhalation two times a day  DOBUTamine Infusion 7.494 MICROgram(s)/kG/Min IV Continuous <Continuous>  docusate sodium 100 milliGRAM(s) Oral three times a day  finasteride 5 milliGRAM(s) Oral daily  influenza   Vaccine 0.5 milliLiter(s) IntraMuscular once  insulin lispro (HumaLOG) corrective regimen sliding scale   SubCutaneous three times a day before meals  insulin lispro (HumaLOG) corrective regimen sliding scale   SubCutaneous at bedtime  levothyroxine 75 MICROGram(s) Oral daily  midodrine 10 milliGRAM(s) Oral every 8 hours  ondansetron Injectable 4 milliGRAM(s) IV Push every 6 hours PRN  ondansetron Injectable 4 milliGRAM(s) IV Push every 8 hours PRN  senna 2 Tablet(s) Oral at bedtime PRN  sevelamer hydrochloride 800 milliGRAM(s) Oral three times a day with meals  warfarin 2 milliGRAM(s) Oral once      REVIEW OF SYSTEMS:    CONSTITUTIONAL: Weak  EYES/ENT: No visual changes;  No vertigo or throat pain   NECK: No pain or stiffness  RESPIRATORY: Dyspneic  CARDIOVASCULAR: No chest pain or palpitations  GASTROINTESTINAL: No abdominal or epigastric pain. No nausea, vomiting, or hematemesis; No diarrhea or constipation. No melena or hematochezia.  GENITOURINARY: No dysuria, frequency or hematuria  NEUROLOGIC: dizzy  SKIN: No itching, burning, rashes, or lesions   All other review of systems is negative unless indicated above    Vital Signs Last 24 Hrs  T(C): 36.7 (29 Oct 2017 05:19), Max: 36.8 (28 Oct 2017 23:47)  T(F): 98 (29 Oct 2017 05:19), Max: 98.3 (28 Oct 2017 23:47)  HR: 74 (29 Oct 2017 08:53) (73 - 83)  BP: 91/58 (29 Oct 2017 08:53) (87/51 - 114/71)  BP(mean): --  RR: 18 (29 Oct 2017 08:53) (16 - 22)  SpO2: 96% (29 Oct 2017 08:53) (89% - 100%)          PHYSICAL EXAM:    Constitutional: NAD, awake and alert, well-developed  HEENT: PERR, EOMI  Neck: soft and supple, No LAD, No JVD  Respiratory: Breath sounds are clear bilaterally, No wheezing, rales or rhonchi  Cardiovascular: Regular rate and rhythm, normal S1 and S2,  no murmurs, gallops or rubs  Gastrointestinal: Bowel Sounds present, soft, nontender.   Extremities: No peripheral edema. No clubbing or cyanosis.  Vascular: 2+ peripheral pulses  Neurological: A/O x 3, no focal deficits  Musculoskeletal: no calf tenderness.  Skin: No rashes.      LABS:                         10.7   5.69  )-----------( 113      ( 29 Oct 2017 06:10 )             35.5                         10.8   6.39  )-----------( 132      ( 28 Oct 2017 19:00 )             36.9     29 Oct 2017 06:10    139    |  99     |  34     ----------------------------<  86     4.3     |  25     |  5.19   28 Oct 2017 19:00    140    |  99     |  29     ----------------------------<  167    4.1     |  26     |  4.53     Ca    8.9        29 Oct 2017 06:10  Ca    9.2        28 Oct 2017 19:00  Phos  2.5       29 Oct 2017 06:10  Mg     2.1       29 Oct 2017 06:10    TPro  8.4    /  Alb  3.3    /  TBili  0.6    /  DBili  x      /  AST  27     /  ALT  19     /  AlkPhos  207    28 Oct 2017 19:00    PT/INR - ( 29 Oct 2017 06:10 )   PT: 19.3 SEC;   INR: 1.70          PTT - ( 28 Oct 2017 19:00 )  PTT:50.6 SEC      CARDIAC MARKERS:    CKMB: 3.41 ng/mL (10-29 @ 01:25)  CKMB: 3.71 ng/mL (10-28 @ 19:00)  proBNP: Serum Pro-Brain Natriuretic Peptide: > 51702 pg/mL (10-28 @ 19:00)        EKG 10/28: SR lahb, prwp
Date of Admission: 10/28/17    CHIEF COMPLAINT: Shortness of breath and dizziness     HISTORY OF PRESENT ILLNESS:    63 y/o male w/ PMHX of NICM?, HFrEF (LVEF 21%, LVIDd 6.2 cm, w/ AICD) on chronic home dobutamine 7.4 mcg/kg/min,  ESRD on HD, HLD, DM II, a.fib on coumadin, interstitial pulmonary lung disease on chronic home O2, hypotension on midodrine comes in with complaints of dizziness and SOB. He has been admitted 6 times this year for various reasons, but mostly for SOB. He admits to HAYWOOD after a few steps, fatigue and dizziness which he currently still has.  RHC 17 on 2.5 mcg/kg/min of dobutamine showed RA 25, PCWP 25, PA 61/29/39, PA sat 42.8%, CI 1.65, CO 3.60, PVR 3.84.     He is a non-smoker, no ETOH or drug abuse. Was not exposed to any toxic chemicals in past.       Allergies    No Known Allergies    	    MEDICATIONS:  amiodarone    Tablet 200 milliGRAM(s) Oral daily  aspirin enteric coated 81 milliGRAM(s) Oral daily  DOBUTamine Infusion 7.494 MICROgram(s)/kG/Min IV Continuous <Continuous>  midodrine 10 milliGRAM(s) Oral every 8 hours  buDESOnide 160 MICROgram(s)/formoterol 4.5 MICROgram(s) Inhaler 2 Puff(s) Inhalation two times a day    ondansetron Injectable 4 milliGRAM(s) IV Push every 6 hours PRN  ondansetron Injectable 4 milliGRAM(s) IV Push every 8 hours PRN    docusate sodium 100 milliGRAM(s) Oral three times a day  polyethylene glycol 3350 17 Gram(s) Oral daily  senna 2 Tablet(s) Oral at bedtime PRN    atorvastatin 80 milliGRAM(s) Oral at bedtime  finasteride 5 milliGRAM(s) Oral daily  insulin lispro (HumaLOG) corrective regimen sliding scale   SubCutaneous three times a day before meals  insulin lispro (HumaLOG) corrective regimen sliding scale   SubCutaneous at bedtime  levothyroxine 75 MICROGram(s) Oral daily    influenza   Vaccine 0.5 milliLiter(s) IntraMuscular once      PAST MEDICAL & SURGICAL HISTORY:  Seizure: Off Keppra since 2017  Chronic hypotension  AF (atrial fibrillation)  COPD (chronic obstructive pulmonary disease): 4L home O2  HLD (hyperlipidemia)  DM (diabetes mellitus): Off Insulin since 2017  ESRD (end stage renal disease) on dialysis  BPH (benign prostatic hypertrophy)  Myocardial infarction: 10/2011  Gout  CHF (congestive heart failure)  AICD (automatic cardioverter/defibrillator) present: Biotronic - placed 09  H/O coronary angiogram      FAMILY HISTORY:  No pertinent family history in first degree relatives      SOCIAL HISTORY:    He is a non-smoker, no ETOH or drug abuse. Was not exposed to any toxic chemicals in past.       REVIEW OF SYSTEMS:  CONSTITUTIONAL: No fever, weight loss. Admits to fatigue.  EYES: No eye pain, visual disturbances, or discharge  ENMT:  No difficulty hearing, tinnitus, vertigo; No sinus or throat pain  NECK: No pain or stiffness  RESPIRATORY: No cough, wheezing, chills or hemoptysis. Admits to SOB.   CARDIOVASCULAR: No chest pain, palpitations. Admits to dizziness. no LE edema.  GASTROINTESTINAL: No abdominal or epigastric pain. No nausea, vomiting, or hematemesis; No diarrhea or constipation. No melena or hematochezia.  GENITOURINARY: No dysuria, frequency, hematuria, or incontinence  NEUROLOGICAL: No headaches, memory loss, loss of strength, numbness, or tremors  SKIN: No itching, burning, rashes, or lesions   LYMPH Nodes: No enlarged glands  ENDOCRINE: No heat or cold intolerance; No hair loss  MUSCULOSKELETAL: No joint pain or swelling; No muscle, back, or extremity pain  PSYCHIATRIC: No depression, anxiety, mood swings, or difficulty sleeping  HEME/LYMPH: No easy bruising, or bleeding gums  ALLERY AND IMMUNOLOGIC: No hives or eczema	      PHYSICAL EXAM:  T(C): 36.6 (10-30-17 @ 13:21), Max: 36.6 (10-30-17 @ 13:21)  HR: 73 (10-30-17 @ :21) (73 - 74)  BP: 90/55 (10-30-17 @ 13:21) (90/55 - 92/50)  RR: 18 (10-30-17 @ 13:21) (18 - 18)  SpO2: 95% (10-30-17 @ :21) (95% - 95%)    Appearance: Normal	  HEENT:   Normal oral mucosa, PERRL, EOMI	  Lymphatic: No lymphadenopathy  Cardiovascular: irregularly irregualr, mild JVD, +KEVIN, no LE edema b/l.   Respiratory: b/l diffuse rhonchi. 	  Psychiatry: A & O x 3, Mood & affect appropriate  Gastrointestinal:  Soft, Non-tender, + BS	  Skin: No rashes, No ecchymoses, No cyanosis	  Neurologic: Non-focal  Extremities: Normal range of motion, No clubbing, cyanosis or edema  Vascular: Peripheral pulses palpable 2+ bilaterally        LABS:	 	    CBC Full  -  ( 30 Oct 2017 05:35 )  WBC Count : 5.74 K/uL  Hemoglobin : 10.6 g/dL  Hematocrit : 35.0 %  Platelet Count - Automated : 104 K/uL  Mean Cell Volume : 97.2 fL  Mean Cell Hemoglobin : 29.4 pg  Mean Cell Hemoglobin Concentration : 30.3 %  Auto Neutrophil # : 3.88 K/uL  Auto Lymphocyte # : 0.97 K/uL  Auto Monocyte # : 0.66 K/uL  Auto Eosinophil # : 0.19 K/uL  Auto Basophil # : 0.03 K/uL  Auto Neutrophil % : 67.6 %  Auto Lymphocyte % : 16.9 %  Auto Monocyte % : 11.5 %  Auto Eosinophil % : 3.3 %  Auto Basophil % : 0.5 %    10-30    137  |  98  |  47<H>  ----------------------------<  110<H>  5.0   |  24  |  6.38<H>  10-29    139  |  99  |  34<H>  ----------------------------<  86  4.3   |  25  |  5.19<H>    Ca    9.2      30 Oct 2017 05:35  Ca    8.9      29 Oct 2017 06:10  Phos  3.6     10-  Phos  2.5     10-  Mg     2.3     10-30  Mg     2.1     10-    TPro  7.8  /  Alb  3.0<L>  /  TBili  0.5  /  DBili  x   /  AST  22  /  ALT  18  /  AlkPhos  172<H>  10-30      proBNP:   Lipid Profile:   HgA1c: Hemoglobin A1C, Whole Blood: 6.3 % (10-30 @ 05:35)    TSH: Thyroid Stimulating Hormone, Serum: 6.05 uIU/mL (10-30 @ 05:35)        CARDIAC MARKERS:      CKMB: 3.41 ng/mL (10-29 @ 01:25)  CKMB: 3.71 ng/mL (10-28 @ 19:00)    CKMB Relative Index: Test not performed (10-28 @ 19:00)    < from: Cardiac Cath Lab - Adult (17 @ 15:25) >  Pressures:  Baseline  Pressures:  - HR: 78  Pressures:  - Rhythm:  Pressures:  -- Aortic Pressure (S/D/M): 116/70/87  Pressures:  -- Pulmonary Artery (S/D/M): 61/29/39  Pressures:  -- Pulmonary Capillary Wedge: /  Pressures:  -- Right Atrium (a/v/M):   Pressures:  -- Right Ventricle (s/edp): //--  O2 Sats:  Baseline  O2 Sats:  - HR: 78  O2 Sats:  - Rhythm:  O2 Sats:  -- AO: 10.5/96/13.71  O2 Sats:  -- PA: 10.5/42.8/6.11  Outputs:  Baseline  Outputs:  -- CALCULATIONS[age]  Outputs:  -- CALCULATIONS: Body Surface Area: 2.19  Outputs:  -- CALCULATIONS: Height in cm: 180.00  Outputs:  -- CALCULATIONS: Sex: Male  Outputs:  -- CALCULATIONS: Weight in k.30  Outputs:  -- OUTPUTS: CO by Cj: 3.60  Outputs:  -- OUTPUTS: Cj cardiac index: 1.65  Outputs:-- OUTPUTS: O2 consumption: 273.56  Outputs:  -- OUTPUTS: Vo2 Indexed: 125.00  Outputs:  -- RESISTANCES: Left ventricular stroke work: 38.43  Outputs:  -- RESISTANCES: Left Ventricular Stroke Work index: 17.56  Outputs:  -- RESISTANCES: Pulmonary vascular index (dsc): 680.52  Outputs:  -- RESISTANCES: Pulmonary vascular index (Wood Units): 8.51  Outputs:  -- RESISTANCES: Pulmonary vascular resistance (dsc): 310.95  Outputs:  -- RESISTANCES: Pulmonary vascular resistance (Wood Units): 3.89  Outputs:  -- RESISTANCES: PVR_SVR Ratio: 0.23  Outputs:  -- RESISTANCES: Right ventricular stroke work: 8.79  Outputs:  -- RESISTANCES: Right ventricular stroke work index: 4.02  Outputs:  -- RESISTANCES: Systemic vascular index (dsc): 3013.72  Outputs:  --RESISTANCES: Systemic vascular index (Wood Units): 37.68  Outputs:  -- RESISTANCES: Systemic vascular resistance (dsc): 1377.08  Outputs:  -- RESISTANCES: Systemic vascular resistance (Wood Units): 17.22  Outputs:  -- RESISTANCES: Total pulmonary index (dsc): 1895.73  Outputs:  -- RESISTANCES: Total pulmonary index (Wood Units): 23.70  Outputs:  -- RESISTANCES: Total pulmonary resistance (dsc): 866.23  Outputs:  -- RESISTANCES: Total pulmonary resistance (Wood Units): 10.83  Outputs:  -- RESISTANCES: Total vascular index (Wood Units): 52.87  Outputs:  -- RESISTANCES: Total vascular resistance (dsc): 1932.36  Outputs:  -- RESISTANCES: Total vascular resistance (Wood Units): 24.16  Outputs:  -- RESISTANCES: Total vascular resistance index (dsc): 4228.93  Outputs:  -- RESISTANCES: TPR_TVR Ratio: 0.45  Outputs:  -- SHUNTS: Pulmonary flow: 3.60  Outputs:  -- SHUNTS: Qp Indexed: 1.65  Outputs:  -- SHUNTS: Qs Indexed: 1.65  Outputs:  -- SHUNTS: Systemic flow: 3.60    < from: Transthoracic Echocardiogram (10.18.17 @ 09:48) >  DIMENSIONS:  Dimensions:     Normal Values:  LA:     5.4 cm    2.0 - 4.0 cm  Ao:     2.4 cm    2.0 - 3.8 cm  SEPTUM: 0.7 cm    0.6 - 1.2 cm  PWT:    1.2 cm    0.6 - 1.1 cm  LVIDd:  6.2 cm    3.0 - 5.6 cm  LVIDs:  5.6 cm    1.8 - 4.0 cm  Derived Variables:  LVMI: 123 g/m2  RWT: 0.38  Fractional short: 10 %  Ejection Fraction (Teicholtz): 21 %  ------------------------------------------------------------------------  OBSERVATIONS:  Mitral Valve: Normal mitral valve. Mild mitral  regurgitation.  Aortic Root: Normal aortic root.  Aortic Valve: Normal trileaflet aortic valve.  Left Atrium: Severely dilated left atrium.  LA volume index  = 56 cc/m2.  Left Ventricle: Severe global left ventricular systolic  dysfunction. Flattening of the interventricular septum in  both systole and diastole is  consistent with right  ventricular pressure overload. Moderate left ventricular  enlargement.  Right Heart: Moderate right atrial enlargement. Right  ventricular enlargement with decreased right ventricular  systolic function. A device wire is noted in the right  heart. Normal tricuspid valve.  Moderate tricuspid  regurgitation. Normal pulmonic valve. Mild pulmonic  regurgitation.  Pericardium/PleuraNormal pericardium with trace pericardial  effusion.  Hemodynamic: Estimated right ventricular systolic pressure  equals 55 mm Hg, assuming right atrial pressure equals 10  mm Hg, consistent with moderate pulmonary hypertension.  Agitated saline injection resulted in a large amount of  bubbles seen in the right heart. Although the exact amount  of cardiac cycles that occurred prior to the crossing of  the bubbles is unclear, the findings are suggestive of a  significant shunt. Consider MARGIE for further workup, if  clinically warrnated.    < end of copied text >

## 2017-10-30 NOTE — PROGRESS NOTE ADULT - PROBLEM SELECTOR PLAN 2
- pt had similar complaint on the last admission. Pt had elevated troponin to .36 secondary to HF and ESRD   - elevated troponin on this admission to .35 - .36, stable, CK and CKMB are not elevated, no evidence of ACS

## 2017-10-30 NOTE — PROGRESS NOTE ADULT - PROBLEM SELECTOR PLAN 8
- Pt states has been having difficulty moving his bowels   - very hard and formed stools   - denies BRBPR and Melena   - Colace and senna for constipation  -cont miralax  f/u ct a/p

## 2017-10-30 NOTE — CONSULT NOTE ADULT - ATTENDING COMMENTS
61 y/o male w/ PMHX of NICM, HFrEF (LVEF 21%, LVIDd 6.2 cm, w/ AICD) on chronic home dobutamine 7.5 mcg/kg/min, ESRD on HD, HLD, DM II, a.fib on coumadin, interstitial pulmonary lung disease on chronic home O2, hypotension on midodrine comes in with complaints of dizziness and SOB x 2 weeks. Appears volume overloaded on exam in particular R sided pressures; difficult to assess if L sided pressures elevated given ILD. Given complexity, recommend Frost and tailor therapy accordingly. Has Stage D HF, NYHA class IV symptoms; unfortunately not a candidate for advanced therapies given ESRD and severe ILD on imaging.

## 2017-10-30 NOTE — CONSULT NOTE ADULT - PROBLEM SELECTOR RECOMMENDATION 9
Maintenance HD schedule MW, last HD 10/27.  Electrolytes acceptable.   Will plan for HD tomorrow, and maximize UF goal, as BP tolerates.   Cont Midorine preHD.
Appears to have NYHA class IV symptoms, but is also dizzy.  Currently maintained on Dobutamine 7.4 mcg/kg/min gtt.   Plans are to do RHC tomorrow and keep swan in to titrate down dobutamine.  Held josé yi for cath tomorrow.

## 2017-10-30 NOTE — PROGRESS NOTE ADULT - PROBLEM SELECTOR PLAN 1
- Pt complaining of episodic dizziness on presentation  - Similar episode occurred on previous admission, the patient had extensive work up which was negative for any new changes.   - Given the patient's low BP in the setting of severely reduced EF, dizziness most likely due to poor perfusion   - Continue with dobutamine infusion and midodrine 10mg TID   - CT head neg

## 2017-10-30 NOTE — PROGRESS NOTE ADULT - SUBJECTIVE AND OBJECTIVE BOX
Pt seen and examined at bedside  No acute events overnight. DEnies dizziness or SOB currently.  Mild HAYWOOD when moving out of bed.     Allergies:  No Known Allergies    Hospital Medications:   MEDICATIONS  (STANDING):  amiodarone    Tablet 200 milliGRAM(s) Oral daily  aspirin enteric coated 81 milliGRAM(s) Oral daily  atorvastatin 80 milliGRAM(s) Oral at bedtime  buDESOnide 160 MICROgram(s)/formoterol 4.5 MICROgram(s) Inhaler 2 Puff(s) Inhalation two times a day  DOBUTamine Infusion 7.494 MICROgram(s)/kG/Min (17.85 mL/Hr) IV Continuous <Continuous>  docusate sodium 100 milliGRAM(s) Oral three times a day  finasteride 5 milliGRAM(s) Oral daily  influenza   Vaccine 0.5 milliLiter(s) IntraMuscular once  insulin lispro (HumaLOG) corrective regimen sliding scale   SubCutaneous three times a day before meals  insulin lispro (HumaLOG) corrective regimen sliding scale   SubCutaneous at bedtime  levothyroxine 75 MICROGram(s) Oral daily  midodrine 10 milliGRAM(s) Oral every 8 hours  polyethylene glycol 3350 17 Gram(s) Oral daily  sevelamer hydrochloride 800 milliGRAM(s) Oral three times a day with meals  warfarin 2 milliGRAM(s) Oral once    VITALS:  T(F): 97.9 (10-30-17 @ 13:21), Max: 97.9 (10-30-17 @ 13:21)  HR: 73 (10-30-17 @ 13:21)  BP: 90/55 (10-30-17 @ 13:21)  RR: 18 (10-30-17 @ 13:21)  SpO2: 95% (10-30-17 @ 13:21)  Wt(kg): --      PHYSICAL EXAM:  Constitutional: NAD  HEENT: anicteric sclera, oropharynx clear, MMM  Neck: No JVD  Respiratory: CTAB, no wheezes, rales or rhonchi  Cardiovascular: S1, S2, RRR  Gastrointestinal: BS+, soft, NT/ND  Extremities: No cyanosis or clubbing. Trace peripheral edema, LUE PICC line   Neurological: A/O x 3, no focal deficits  Psychiatric: Normal mood, normal affect  : No CVA tenderness. No rosa.   Skin: No rashes  Vascular Access: [facility] tunneled cath    LABS:  10-30    137  |  98  |  47<H>  ----------------------------<  110<H>  5.0   |  24  |  6.38<H>    Ca    9.2      30 Oct 2017 05:35  Phos  3.6     10-30  Mg     2.3     10-30    TPro  7.8  /  Alb  3.0<L>  /  TBili  0.5  /  DBili      /  AST  22  /  ALT  18  /  AlkPhos  172<H>  10-30    Creatinine Trend: 6.38 <--, 5.19 <--, 4.53 <--                        10.6   5.74  )-----------( 104      ( 30 Oct 2017 05:35 )             35.0     Urine Studies:      RADIOLOGY & ADDITIONAL STUDIES:

## 2017-10-31 LAB
BUN SERPL-MCNC: 27 MG/DL — HIGH (ref 7–23)
BUN SERPL-MCNC: 33 MG/DL — HIGH (ref 7–23)
CALCIUM SERPL-MCNC: 7.7 MG/DL — LOW (ref 8.4–10.5)
CALCIUM SERPL-MCNC: 8.7 MG/DL — SIGNIFICANT CHANGE UP (ref 8.4–10.5)
CHLORIDE SERPL-SCNC: 80 MMOL/L — LOW (ref 98–107)
CHLORIDE SERPL-SCNC: 95 MMOL/L — LOW (ref 98–107)
CO2 SERPL-SCNC: 20 MMOL/L — LOW (ref 22–31)
CO2 SERPL-SCNC: 26 MMOL/L — SIGNIFICANT CHANGE UP (ref 22–31)
CREAT SERPL-MCNC: 4.31 MG/DL — HIGH (ref 0.5–1.3)
CREAT SERPL-MCNC: 5.22 MG/DL — HIGH (ref 0.5–1.3)
GLUCOSE BLDC GLUCOMTR-MCNC: 201 MG/DL — HIGH (ref 70–99)
GLUCOSE SERPL-MCNC: 191 MG/DL — HIGH (ref 70–99)
GLUCOSE SERPL-MCNC: 93 MG/DL — SIGNIFICANT CHANGE UP (ref 70–99)
HCT VFR BLD CALC: 34.5 % — LOW (ref 39–50)
HGB BLD-MCNC: 10.4 G/DL — LOW (ref 13–17)
INR BLD: 1.83 — HIGH (ref 0.88–1.17)
MAGNESIUM SERPL-MCNC: 2.3 MG/DL — SIGNIFICANT CHANGE UP (ref 1.6–2.6)
MCHC RBC-ENTMCNC: 28.7 PG — SIGNIFICANT CHANGE UP (ref 27–34)
MCHC RBC-ENTMCNC: 30.1 % — LOW (ref 32–36)
MCV RBC AUTO: 95.3 FL — SIGNIFICANT CHANGE UP (ref 80–100)
NRBC # FLD: 0 — SIGNIFICANT CHANGE UP
PLATELET # BLD AUTO: 111 K/UL — LOW (ref 150–400)
PMV BLD: 12.1 FL — SIGNIFICANT CHANGE UP (ref 7–13)
POTASSIUM SERPL-MCNC: 3.8 MMOL/L — SIGNIFICANT CHANGE UP (ref 3.5–5.3)
POTASSIUM SERPL-MCNC: 4.4 MMOL/L — SIGNIFICANT CHANGE UP (ref 3.5–5.3)
POTASSIUM SERPL-SCNC: 3.8 MMOL/L — SIGNIFICANT CHANGE UP (ref 3.5–5.3)
POTASSIUM SERPL-SCNC: 4.4 MMOL/L — SIGNIFICANT CHANGE UP (ref 3.5–5.3)
PROTHROM AB SERPL-ACNC: 20.7 SEC — HIGH (ref 9.8–13.1)
RBC # BLD: 3.62 M/UL — LOW (ref 4.2–5.8)
RBC # FLD: 18.6 % — HIGH (ref 10.3–14.5)
SODIUM SERPL-SCNC: 134 MMOL/L — LOW (ref 135–145)
SODIUM SERPL-SCNC: 153 MMOL/L — HIGH (ref 135–145)
WBC # BLD: 5.29 K/UL — SIGNIFICANT CHANGE UP (ref 3.8–10.5)
WBC # FLD AUTO: 5.29 K/UL — SIGNIFICANT CHANGE UP (ref 3.8–10.5)

## 2017-10-31 PROCEDURE — 93971 EXTREMITY STUDY: CPT | Mod: 26

## 2017-10-31 PROCEDURE — 99233 SBSQ HOSP IP/OBS HIGH 50: CPT

## 2017-10-31 RX ORDER — ACETAMINOPHEN 500 MG
650 TABLET ORAL EVERY 6 HOURS
Qty: 0 | Refills: 0 | Status: DISCONTINUED | OUTPATIENT
Start: 2017-10-31 | End: 2017-11-01

## 2017-10-31 RX ORDER — WARFARIN SODIUM 2.5 MG/1
2 TABLET ORAL ONCE
Qty: 0 | Refills: 0 | Status: DISCONTINUED | OUTPATIENT
Start: 2017-10-31 | End: 2017-10-31

## 2017-10-31 RX ADMIN — Medication 17.85 MICROGRAM(S)/KG/MIN: at 22:08

## 2017-10-31 RX ADMIN — ATORVASTATIN CALCIUM 80 MILLIGRAM(S): 80 TABLET, FILM COATED ORAL at 22:11

## 2017-10-31 RX ADMIN — MIDODRINE HYDROCHLORIDE 10 MILLIGRAM(S): 2.5 TABLET ORAL at 05:43

## 2017-10-31 RX ADMIN — FINASTERIDE 5 MILLIGRAM(S): 5 TABLET, FILM COATED ORAL at 12:15

## 2017-10-31 RX ADMIN — Medication 2: at 18:18

## 2017-10-31 RX ADMIN — MIDODRINE HYDROCHLORIDE 10 MILLIGRAM(S): 2.5 TABLET ORAL at 18:19

## 2017-10-31 RX ADMIN — SEVELAMER CARBONATE 800 MILLIGRAM(S): 2400 POWDER, FOR SUSPENSION ORAL at 09:47

## 2017-10-31 RX ADMIN — MIDODRINE HYDROCHLORIDE 10 MILLIGRAM(S): 2.5 TABLET ORAL at 22:11

## 2017-10-31 RX ADMIN — AMIODARONE HYDROCHLORIDE 200 MILLIGRAM(S): 400 TABLET ORAL at 05:43

## 2017-10-31 RX ADMIN — Medication 81 MILLIGRAM(S): at 12:15

## 2017-10-31 RX ADMIN — SEVELAMER CARBONATE 800 MILLIGRAM(S): 2400 POWDER, FOR SUSPENSION ORAL at 12:14

## 2017-10-31 RX ADMIN — Medication 75 MICROGRAM(S): at 05:43

## 2017-10-31 RX ADMIN — SEVELAMER CARBONATE 800 MILLIGRAM(S): 2400 POWDER, FOR SUSPENSION ORAL at 18:19

## 2017-10-31 RX ADMIN — BUDESONIDE AND FORMOTEROL FUMARATE DIHYDRATE 2 PUFF(S): 160; 4.5 AEROSOL RESPIRATORY (INHALATION) at 12:18

## 2017-10-31 RX ADMIN — Medication 100 MILLIGRAM(S): at 05:43

## 2017-10-31 RX ADMIN — BUDESONIDE AND FORMOTEROL FUMARATE DIHYDRATE 2 PUFF(S): 160; 4.5 AEROSOL RESPIRATORY (INHALATION) at 22:11

## 2017-10-31 RX ADMIN — Medication: at 15:00

## 2017-10-31 RX ADMIN — Medication 100 MILLIGRAM(S): at 22:11

## 2017-10-31 RX ADMIN — Medication 17.85 MICROGRAM(S)/KG/MIN: at 03:40

## 2017-10-31 NOTE — PROGRESS NOTE ADULT - SUBJECTIVE AND OBJECTIVE BOX
Pt seen and examined at bedside  Pt reports mild [place], but improved compared to yesterday.   Denies cough or lightheadedness.     Allergies:  No Known Allergies    Hospital Medications:   MEDICATIONS  (STANDING):  amiodarone    Tablet 200 milliGRAM(s) Oral daily  aspirin enteric coated 81 milliGRAM(s) Oral daily  atorvastatin 80 milliGRAM(s) Oral at bedtime  buDESOnide 160 MICROgram(s)/formoterol 4.5 MICROgram(s) Inhaler 2 Puff(s) Inhalation two times a day  DOBUTamine Infusion 7.494 MICROgram(s)/kG/Min (17.85 mL/Hr) IV Continuous <Continuous>  docusate sodium 100 milliGRAM(s) Oral three times a day  finasteride 5 milliGRAM(s) Oral daily  influenza   Vaccine 0.5 milliLiter(s) IntraMuscular once  insulin lispro (HumaLOG) corrective regimen sliding scale   SubCutaneous three times a day before meals  insulin lispro (HumaLOG) corrective regimen sliding scale   SubCutaneous at bedtime  levothyroxine 75 MICROGram(s) Oral daily  midodrine 10 milliGRAM(s) Oral every 8 hours  polyethylene glycol 3350 17 Gram(s) Oral daily  sevelamer hydrochloride 800 milliGRAM(s) Oral three times a day with meals      VITALS:  T(F): 98 (10-31-17 @ 11:42), Max: 98.4 (10-30-17 @ 21:00)  HR: 74 (10-31-17 @ 11:42)  BP: 83/51 (10-31-17 @ 11:42)  RR: 18 (10-31-17 @ 11:42)  SpO2: 97% (10-31-17 @ 11:42)  Wt(kg): --    10-30 @ 07:01  -  10-31 @ 07:00  --------------------------------------------------------  IN: 500 mL / OUT: 2500 mL / NET: -2000 mL      PHYSICAL EXAM:  Constitutional: NAD  HEENT: anicteric sclera, oropharynx clear, MMM  Neck: No JVD  Respiratory: CTAB, no wheezes, rales or rhonchi  Cardiovascular: S1, S2, RRR  Gastrointestinal: BS+, soft, NT/ND  Extremities: No cyanosis or clubbing. No peripheral edema  Neurological: A/O x 3, no focal deficits  Psychiatric: Normal mood, normal affect  : No CVA tenderness. No rosa.   Skin: No rashes  Vascular Access: RIJ tunneled cath     LABS:  10-31    153<H>  |  80<L>  |  27<H>  ----------------------------<  93  3.8   |  20<L>  |  4.31<H>    Ca    7.7<L>      31 Oct 2017 10:55  Phos  3.6     10-30  Mg     2.3     10-31    TPro  7.8  /  Alb  3.0<L>  /  TBili  0.5  /  DBili      /  AST  22  /  ALT  18  /  AlkPhos  172<H>  10-30    Creatinine Trend: 4.31 <--, 6.38 <--, 5.19 <--, 4.53 <--                        10.4   5.29  )-----------( 111      ( 31 Oct 2017 10:55 )             34.5     Urine Studies:      RADIOLOGY & ADDITIONAL STUDIES:

## 2017-10-31 NOTE — PROGRESS NOTE ADULT - SUBJECTIVE AND OBJECTIVE BOX
SUBJECTIVE: Mildly dyspneic in chair.  	  MEDICATIONS:  amiodarone    Tablet 200 milliGRAM(s) Oral daily  DOBUTamine Infusion 7.494 MICROgram(s)/kG/Min IV Continuous <Continuous>  midodrine 10 milliGRAM(s) Oral every 8 hours  buDESOnide 160 MICROgram(s)/formoterol 4.5 MICROgram(s) Inhaler 2 Puff(s) Inhalation two times a day  ondansetron Injectable 4 milliGRAM(s) IV Push every 6 hours PRN  ondansetron Injectable 4 milliGRAM(s) IV Push every 8 hours PRN  docusate sodium 100 milliGRAM(s) Oral three times a day  polyethylene glycol 3350 17 Gram(s) Oral daily  senna 2 Tablet(s) Oral at bedtime PRN  atorvastatin 80 milliGRAM(s) Oral at bedtime  finasteride 5 milliGRAM(s) Oral daily  insulin lispro (HumaLOG) corrective regimen sliding scale   SubCutaneous three times a day before meals  insulin lispro (HumaLOG) corrective regimen sliding scale   SubCutaneous at bedtime  levothyroxine 75 MICROGram(s) Oral daily  aspirin enteric coated 81 milliGRAM(s) Oral daily  influenza   Vaccine 0.5 milliLiter(s) IntraMuscular once      REVIEW OF SYSTEMS:    CONSTITUTIONAL: No fever, weight loss, or fatigue  EYES: No eye pain, visual disturbances, or discharge  NECK: No pain or stiffness  RESPIRATORY: No cough, wheezing, chills or hemoptysis; No Shortness of Breath  CARDIOVASCULAR: No chest pain, palpitations, dizziness, or leg swelling  GASTROINTESTINAL: No abdominal or epigastric pain. No nausea, vomiting, or hematemesis; No diarrhea or constipation. No melena or hematochezia.  GENITOURINARY: No dysuria, frequency, hematuria, or incontinence  NEUROLOGICAL: No headaches, memory loss, loss of strength, numbness, or tremors  SKIN: No itching, burning, rashes, or lesions   LYMPH Nodes: No enlarged glands  MUSCULOSKELETAL: No joint pain or swelling; No muscle, back, or extremity pain  All other review of systems are negative.  	      PHYSICAL EXAM:  T(C): 36.7 (10-31-17 @ 05:39), Max: 36.9 (10-30-17 @ 21:00)  HR: 78 (10-31-17 @ 05:39) (63 - 80)  BP: 94/60 (10-31-17 @ 05:39) (90/55 - 101/559)  RR: 18 (10-31-17 @ 05:39) (18 - 20)  SpO2: 95% (10-31-17 @ 05:39) (95% - 95%)  Wt(kg): --  I&O's Summary    30 Oct 2017 07:01  -  31 Oct 2017 07:00  --------------------------------------------------------  IN: 500 mL / OUT: 2500 mL / NET: -2000 mL          PHYSICAL EXAM    Appearance: Normal	  HEENT:   Normal oral mucosa, PERRL, EOMI	  NECK: Soft and supple, No LAD, No JVD  Cardiovascular: Regular Rate and Rhythm, Normal S1 S2, No murmurs, No clicks, gallops or rubs  Respiratory: Lungs clear to auscultation	  Gastrointestinal:  Soft, Non-tender, + BS	  Skin: No rashes, No ecchymoses, No cyanosis  Neurologic: Non-focal  Extremities: No clubbing, cyanosis or edema  Vascular: Peripheral pulses palpable 2+ bilaterally      LABS:	 	                 10.6   5.74  )-----------( 104      ( 30 Oct 2017 05:35 )             35.0     10-30    137  |  98  |  47<H>  ----------------------------<  110<H>  5.0   |  24  |  6.38<H>    Ca    9.2      30 Oct 2017 05:35  Phos  3.6     10-30  Mg     2.3     10-30    TPro  7.8  /  Alb  3.0<L>  /  TBili  0.5  /  DBili  x   /  AST  22  /  ALT  18  /  AlkPhos  172<H>  10-30

## 2017-10-31 NOTE — DIETITIAN INITIAL EVALUATION ADULT. - NUTRITION INTERVENTION
Strategies/Nutrition Education Meals and Snack Medical Food Supplements/Nutrition Education/Strategies

## 2017-10-31 NOTE — PROGRESS NOTE ADULT - PROBLEM SELECTOR PLAN 1
Maintenance HD schedule MWF.  Electrolytes acceptable.   HD yesterday, with 2kg UF achieved. Flowsheet reviewed.   Next HD tomorrow.   Cont midodrine with HD.

## 2017-10-31 NOTE — PROGRESS NOTE ADULT - PROBLEM SELECTOR PLAN 5
- TTE from last admission on 10/18 showed EF of 21% with severe global LV dysfunction  - c/w Dobutamine drip at 7mcg/kg/min  - Cards consult noted, for RHC today

## 2017-10-31 NOTE — DIETITIAN INITIAL EVALUATION ADULT. - PERTINENT LABORATORY DATA
10-30 Na137 mmol/L Glu 110 mg/dL<H> K+ 5.0 mmol/L Cr  6.38 mg/dL<H> BUN 47 mg/dL<H> Phos 3.6 mg/dL Alb 3.0 g/dL<L> PAB n/a

## 2017-10-31 NOTE — PROGRESS NOTE ADULT - SUBJECTIVE AND OBJECTIVE BOX
Patient is a 62y old  Male who presents with a chief complaint of dizziness, Left ARM pain , Abdominal pain (29 Oct 2017 00:05)      SUBJECTIVE / OVERNIGHT EVENTS: Pt was seen and examined at 1150am, no overnight events, still c/o sob, no chest pain, still c/o left arm pain, no other issues, scheduled for RHC today.    MEDICATIONS  (STANDING):  amiodarone    Tablet 200 milliGRAM(s) Oral daily  aspirin enteric coated 81 milliGRAM(s) Oral daily  atorvastatin 80 milliGRAM(s) Oral at bedtime  buDESOnide 160 MICROgram(s)/formoterol 4.5 MICROgram(s) Inhaler 2 Puff(s) Inhalation two times a day  DOBUTamine Infusion 7.494 MICROgram(s)/kG/Min (17.85 mL/Hr) IV Continuous <Continuous>  docusate sodium 100 milliGRAM(s) Oral three times a day  finasteride 5 milliGRAM(s) Oral daily  influenza   Vaccine 0.5 milliLiter(s) IntraMuscular once  insulin lispro (HumaLOG) corrective regimen sliding scale   SubCutaneous three times a day before meals  insulin lispro (HumaLOG) corrective regimen sliding scale   SubCutaneous at bedtime  levothyroxine 75 MICROGram(s) Oral daily  midodrine 10 milliGRAM(s) Oral every 8 hours  polyethylene glycol 3350 17 Gram(s) Oral daily  sevelamer hydrochloride 800 milliGRAM(s) Oral three times a day with meals    MEDICATIONS  (PRN):  acetaminophen   Tablet. 650 milliGRAM(s) Oral every 6 hours PRN Mild Pain (1 - 3)  ondansetron Injectable 4 milliGRAM(s) IV Push every 6 hours PRN Nausea and/or Vomiting  ondansetron Injectable 4 milliGRAM(s) IV Push every 8 hours PRN Nausea and/or Vomiting  senna 2 Tablet(s) Oral at bedtime PRN Constipation      Vital Signs Last 24 Hrs  T(C): 36.7 (31 Oct 2017 11:42), Max: 36.9 (30 Oct 2017 21:00)  T(F): 98 (31 Oct 2017 11:42), Max: 98.4 (30 Oct 2017 21:00)  HR: 74 (31 Oct 2017 11:42) (63 - 80)  BP: 83/51 (31 Oct 2017 11:42) (83/51 - 101/559)  BP(mean): --  RR: 18 (31 Oct 2017 11:42) (18 - 20)  SpO2: 97% (31 Oct 2017 11:42) (95% - 97%)  CAPILLARY BLOOD GLUCOSE  201 (31 Oct 2017 13:45)      POCT Blood Glucose.: 201 mg/dL (31 Oct 2017 13:34)  POCT Blood Glucose.: 115 mg/dL (30 Oct 2017 21:59)  POCT Blood Glucose.: 209 mg/dL (30 Oct 2017 16:50)    I&O's Summary    30 Oct 2017 07:01  -  31 Oct 2017 07:00  --------------------------------------------------------  IN: 500 mL / OUT: 2500 mL / NET: -2000 mL        PHYSICAL EXAM:  GENERAL: NAD, well-developed  CHEST/LUNG: Left lung base with minimal crackles, rt clear  HEART: s1, s2+  ABDOMEN: Soft, Nontender, Nondistended  EXTREMITIES:  NO LE edema  PSYCH: calm  NEUROLOGY: alert, awake responsive  SKIN: No rashes or lesions    LABS:                        10.4   5.29  )-----------( 111      ( 31 Oct 2017 10:55 )             34.5     10-31    153<H>  |  80<L>  |  27<H>  ----------------------------<  93  3.8   |  20<L>  |  4.31<H>    Ca    7.7<L>      31 Oct 2017 10:55  Phos  3.6     10-30  Mg     2.3     10-31    TPro  7.8  /  Alb  3.0<L>  /  TBili  0.5  /  DBili  x   /  AST  22  /  ALT  18  /  AlkPhos  172<H>  10-30    PT/INR - ( 30 Oct 2017 05:35 )   PT: 19.9 SEC;   INR: 1.76            CARDIAC MARKERS ( 30 Oct 2017 05:35 )  x     / 0.33 ng/mL / 47 u/L / x     / x              RADIOLOGY & ADDITIONAL TESTS:    Imaging Personally Reviewed:    Consultant(s) Notes Reviewed:      Care Discussed with Consultants/Other Providers:

## 2017-10-31 NOTE — DIETITIAN INITIAL EVALUATION ADULT. - NS AS NUTRI INTERV ED CONTENT2
Purpose of the nutrition education/Priority modifications/Survival information/Recommended modifications

## 2017-10-31 NOTE — DIETITIAN INITIAL EVALUATION ADULT. - DIET TYPE
regular/DASH/TLC (sodium and cholesterol restricted diet)/renal replacement pts:no protein restr,no conc K & phos, low sodium/consistent carbohydrate (no snacks)/1200ml

## 2017-10-31 NOTE — DIETITIAN INITIAL EVALUATION ADULT. - PROBLEM SELECTOR PLAN 2
- pt c/o of episodic pressure like left sided chest pain   - pt had similar complaint on the last admission. Pt had elevated troponin to .36 secondary to HF and ESRD   - elevated troponin on this admission to .35  - continue to trend q6h x2, but ACS is unlikely given EKG showed NSR with no ST changes

## 2017-10-31 NOTE — PROGRESS NOTE ADULT - SUBJECTIVE AND OBJECTIVE BOX
Interval History:  - refused [place] today; would like to think about it  - tolerated HD yesterday  - BP still low    Medications:  acetaminophen   Tablet. 650 milliGRAM(s) Oral every 6 hours PRN  amiodarone    Tablet 200 milliGRAM(s) Oral daily  aspirin enteric coated 81 milliGRAM(s) Oral daily  atorvastatin 80 milliGRAM(s) Oral at bedtime  buDESOnide 160 MICROgram(s)/formoterol 4.5 MICROgram(s) Inhaler 2 Puff(s) Inhalation two times a day  DOBUTamine Infusion 7.494 MICROgram(s)/kG/Min IV Continuous <Continuous>  docusate sodium 100 milliGRAM(s) Oral three times a day  finasteride 5 milliGRAM(s) Oral daily  influenza   Vaccine 0.5 milliLiter(s) IntraMuscular once  insulin lispro (HumaLOG) corrective regimen sliding scale   SubCutaneous three times a day before meals  insulin lispro (HumaLOG) corrective regimen sliding scale   SubCutaneous at bedtime  levothyroxine 75 MICROGram(s) Oral daily  midodrine 10 milliGRAM(s) Oral every 8 hours  ondansetron Injectable 4 milliGRAM(s) IV Push every 6 hours PRN  ondansetron Injectable 4 milliGRAM(s) IV Push every 8 hours PRN  polyethylene glycol 3350 17 Gram(s) Oral daily  senna 2 Tablet(s) Oral at bedtime PRN  sevelamer hydrochloride 800 milliGRAM(s) Oral three times a day with meals  warfarin 2 milliGRAM(s) Oral once      Vitals:  T(C): 36.7 (10-31-17 @ 11:42), Max: 36.9 (10-30-17 @ 21:00)  HR: 74 (10-31-17 @ 11:42) (63 - 80)  BP: 83/51 (10-31-17 @ 11:42) (83/51 - 101/559)  RR: 18 (10-31-17 @ 11:42) (18 - 20)  SpO2: 97% (10-31-17 @ 11:42) (95% - 97%)      Daily     Daily Weight in k.1 (31 Oct 2017 09:35)    I&O's Summary    30 Oct 2017 07:01  -  31 Oct 2017 07:00  --------------------------------------------------------  IN: 500 mL / OUT: 2500 mL / NET: -2000 mL    Physical Exam:  Appearance: No Acute Distress  HEENT: elevated JVD approx 12 cm w/ HJR  Cardiovascular: Normal S1 S2, No murmurs/rubs/gallops  Respiratory: Clear to auscultation bilaterally  Gastrointestinal: Soft, Non-tender	  Skin: No cyanosis	  Neurologic: Non-focal  Extremities: 1+ LE edema  Psychiatry: A & O x 3, Mood & affect appropriate    Labs:                        10.4   5.29  )-----------( 111      ( 31 Oct 2017 10:55 )             34.5     10-31    153<H>  |  80<L>  |  27<H>  ----------------------------<  93  3.8   |  20<L>  |  4.31<H>    Ca    7.7<L>      31 Oct 2017 10:55  Phos  3.6     10-30  Mg     2.3     10-31    TPro  7.8  /  Alb  3.0<L>  /  TBili  0.5  /  DBili  x   /  AST  22  /  ALT  18  /  AlkPhos  172<H>  10-30    PT/INR - ( 31 Oct 2017 16:40 )   PT: 20.7 SEC;   INR: 1.83            CARDIAC MARKERS ( 30 Oct 2017 05:35 )  x     / 0.33 ng/mL / 47 u/L / x     / x          Serum Pro-Brain Natriuretic Peptide: > 47466 pg/mL (10-28 @ 19:00)

## 2017-10-31 NOTE — DIETITIAN INITIAL EVALUATION ADULT. - PROBLEM SELECTOR PLAN 5
- TTE from last admission on 10/18 showed EF of 21% with severe global LV dysfunction  - c/w Dobutamine drip at 7mcg/kg/min  - Consult Cardiology (Dr. Davis) in the AM

## 2017-10-31 NOTE — DIETITIAN INITIAL EVALUATION ADULT. - PROBLEM SELECTOR PLAN 7
- hx of afib  - on coumadin for AC   - Current INR is 2.01  - Unsure if outpatient INR was correct with reading of 8  - Pt denies melena and FOBT was negative   - safe to resume AC, pt is on 2mg of coumadin at home currently will give stat dose now

## 2017-10-31 NOTE — PROGRESS NOTE ADULT - PROBLEM SELECTOR PLAN 8
- Pt states has been having difficulty moving his bowels   - very hard and formed stools   - denies BRBPR and Melena   - Colace and senna for constipation  -cont miralax   ct a/p neg for obstruction

## 2017-10-31 NOTE — DIETITIAN INITIAL EVALUATION ADULT. - OTHER INFO
Nutrition consult received for dialysis. Pt is a 62 M with a past medical Hx inclusive of ESRD on HD,  NICM with severe LV dysfunction, Afib on coumadin, COPD admitted with dizziness and elevated INR on out patient labs.  Patient denies any nausea/vomiting/diarrhea/constipation or difficulty chewing and swallowing. Good PO intake and appetite reported. Nutrition consult received for dialysis. Pt is a 62 M with a past medical Hx inclusive of ESRD on HD,  NICM with severe LV dysfunction, Afib on coumadin, COPD admitted with dizziness and elevated INR on out patient labs.  Patient denies any nausea/vomiting/diarrhea/constipation or difficulty chewing and swallowing. Fair PO intake and appetite reported at present.  However, Pt states that his PO intake has been suboptimal.  Pt with extended hospitalization this past year, which resulted in significant Wt. loss per Pt.  Per review of chart, Pt is with 51 pounds Wt. loss since April 2016.  PO intake and protein intake encourage.  Pt amenable to Nepro 1x daily (425 kcals, 19.1g protein).

## 2017-10-31 NOTE — CHART NOTE - NSCHARTNOTEFT_GEN_A_CORE
NUTRITION SERVICES                                                                                  MALNUTRITION ALERT     Attention Health Care Provider: Upon nutritional assessment by the Registered Dietitian your patient was determined to meet criteria / has evidence of the following diagnosis/diagnoses:    [ ] Mild Protein Calorie Malnutrition   [ ] Moderate Protein Calorie Malnutrition   [X] Severe Protein Calorie Malnutrition   [ ] Unspecified Protein Calorie Malnutrition   [ ] Underweight / BMI <19  [ ] Morbid Obesity / BMI >40    By signing this assessment you are acknowledging the diagnosis/diagnoses.       PLAN OF CARE: Refer to Initial Dietitian Evaluation or Nutrition Follow-Up Documentation for Nutritional Recommendations.

## 2017-11-01 ENCOUNTER — TRANSCRIPTION ENCOUNTER (OUTPATIENT)
Age: 62
End: 2017-11-01

## 2017-11-01 VITALS — WEIGHT: 173.06 LBS

## 2017-11-01 LAB
BUN SERPL-MCNC: 43 MG/DL — HIGH (ref 7–23)
CALCIUM SERPL-MCNC: 8.8 MG/DL — SIGNIFICANT CHANGE UP (ref 8.4–10.5)
CHLORIDE SERPL-SCNC: 96 MMOL/L — LOW (ref 98–107)
CO2 SERPL-SCNC: 26 MMOL/L — SIGNIFICANT CHANGE UP (ref 22–31)
CREAT SERPL-MCNC: 6.12 MG/DL — HIGH (ref 0.5–1.3)
GLUCOSE BLDC GLUCOMTR-MCNC: 127 MG/DL — HIGH (ref 70–99)
GLUCOSE BLDC GLUCOMTR-MCNC: 82 MG/DL — SIGNIFICANT CHANGE UP (ref 70–99)
GLUCOSE BLDC GLUCOMTR-MCNC: 88 MG/DL — SIGNIFICANT CHANGE UP (ref 70–99)
GLUCOSE BLDC GLUCOMTR-MCNC: 94 MG/DL — SIGNIFICANT CHANGE UP (ref 70–99)
GLUCOSE SERPL-MCNC: 92 MG/DL — SIGNIFICANT CHANGE UP (ref 70–99)
HCT VFR BLD CALC: 31.8 % — LOW (ref 39–50)
HGB BLD-MCNC: 9.9 G/DL — LOW (ref 13–17)
INR BLD: 1.7 — HIGH (ref 0.88–1.17)
MAGNESIUM SERPL-MCNC: 2.1 MG/DL — SIGNIFICANT CHANGE UP (ref 1.6–2.6)
MCHC RBC-ENTMCNC: 29.6 PG — SIGNIFICANT CHANGE UP (ref 27–34)
MCHC RBC-ENTMCNC: 31.1 % — LOW (ref 32–36)
MCV RBC AUTO: 94.9 FL — SIGNIFICANT CHANGE UP (ref 80–100)
NRBC # FLD: 0 — SIGNIFICANT CHANGE UP
PLATELET # BLD AUTO: 98 K/UL — LOW (ref 150–400)
PMV BLD: 13.3 FL — HIGH (ref 7–13)
POTASSIUM SERPL-MCNC: 4.6 MMOL/L — SIGNIFICANT CHANGE UP (ref 3.5–5.3)
POTASSIUM SERPL-SCNC: 4.6 MMOL/L — SIGNIFICANT CHANGE UP (ref 3.5–5.3)
PROTHROM AB SERPL-ACNC: 19.3 SEC — HIGH (ref 9.8–13.1)
RBC # BLD: 3.35 M/UL — LOW (ref 4.2–5.8)
RBC # FLD: 18.5 % — HIGH (ref 10.3–14.5)
SODIUM SERPL-SCNC: 136 MMOL/L — SIGNIFICANT CHANGE UP (ref 135–145)
WBC # BLD: 5.86 K/UL — SIGNIFICANT CHANGE UP (ref 3.8–10.5)
WBC # FLD AUTO: 5.86 K/UL — SIGNIFICANT CHANGE UP (ref 3.8–10.5)

## 2017-11-01 PROCEDURE — 99239 HOSP IP/OBS DSCHRG MGMT >30: CPT

## 2017-11-01 RX ORDER — DOBUTAMINE HCL 250MG/20ML
7.4 VIAL (ML) INTRAVENOUS
Qty: 30 | Refills: 0 | OUTPATIENT
Start: 2017-11-01

## 2017-11-01 RX ORDER — MIDODRINE HYDROCHLORIDE 2.5 MG/1
1 TABLET ORAL
Qty: 90 | Refills: 0 | OUTPATIENT
Start: 2017-11-01 | End: 2017-12-01

## 2017-11-01 RX ORDER — POLYETHYLENE GLYCOL 3350 17 G/17G
17 POWDER, FOR SOLUTION ORAL
Qty: 0 | Refills: 0 | COMMUNITY
Start: 2017-11-01

## 2017-11-01 RX ORDER — SENNA PLUS 8.6 MG/1
2 TABLET ORAL
Qty: 0 | Refills: 0 | COMMUNITY
Start: 2017-11-01

## 2017-11-01 RX ADMIN — POLYETHYLENE GLYCOL 3350 17 GRAM(S): 17 POWDER, FOR SOLUTION ORAL at 11:49

## 2017-11-01 RX ADMIN — Medication 81 MILLIGRAM(S): at 11:49

## 2017-11-01 RX ADMIN — AMIODARONE HYDROCHLORIDE 200 MILLIGRAM(S): 400 TABLET ORAL at 11:48

## 2017-11-01 RX ADMIN — Medication 100 MILLIGRAM(S): at 13:39

## 2017-11-01 RX ADMIN — SEVELAMER CARBONATE 800 MILLIGRAM(S): 2400 POWDER, FOR SUSPENSION ORAL at 11:49

## 2017-11-01 RX ADMIN — MIDODRINE HYDROCHLORIDE 10 MILLIGRAM(S): 2.5 TABLET ORAL at 13:39

## 2017-11-01 RX ADMIN — FINASTERIDE 5 MILLIGRAM(S): 5 TABLET, FILM COATED ORAL at 11:49

## 2017-11-01 RX ADMIN — Medication 75 MICROGRAM(S): at 05:49

## 2017-11-01 RX ADMIN — MIDODRINE HYDROCHLORIDE 10 MILLIGRAM(S): 2.5 TABLET ORAL at 05:49

## 2017-11-01 NOTE — PROGRESS NOTE ADULT - PROBLEM SELECTOR PLAN 3
- Pt has history of chronic hypotension due to advance HF  - c/w Dobutamine drip at 7mcg/kg/min and midodrine 100mg TID  - Ambulation only with assistance
- Pt has history of chronic hypotension due to advance HF  - c/w Dobutamine drip at 7mcg/kg/min and midodrine 100mg TID  - Ambulation only with assistance  RH today
- Pt has history of chronic hypotension due to advance HF  - c/w Dobutamine drip at 7mcg/kg/min and midodrine 100mg TID  - Ambulation only with assistance.  Pt declined RHC and per his cardiologist no further information cane be gained with RHC, rec to increased midodrine
- Pt has history of chronic hypotension due to advance HF  - c/w Dobutamine drip at 7mcg/kg/min and midodrine 100mg TID  - Ambulation only with assistance

## 2017-11-01 NOTE — DISCHARGE NOTE ADULT - CARE PROVIDER_API CALL
Thaddeus Davis), Cardiovascular Disease; Internal Medicine  [address] [id number]  Phone: [phone]  Fax: [phone]

## 2017-11-01 NOTE — PROGRESS NOTE ADULT - PROBLEM SELECTOR PLAN 1
Maintenance HD schedule MWF.  Electrolytes acceptable.   Tolerating HD today with 2.5kg UF goal. \  Cont midodrine with HD.  Further cardiac w/u, as per cards and HF team

## 2017-11-01 NOTE — DISCHARGE NOTE ADULT - MEDICATION SUMMARY - MEDICATIONS TO TAKE
I will START or STAY ON the medications listed below when I get home from the hospital:    finasteride 5 mg oral tablet  -- 1 tab(s) by mouth once a day  -- Indication: For BPH    Ecotrin Adult Low Strength 81 mg oral delayed release tablet  -- 1 tab(s) by mouth once a day  -- Indication: For Cardioprotective    amiodarone 200 mg oral tablet  -- 1 tab(s) by mouth once a day  -- Indication: For ChF    Coumadin 5 mg oral tablet  -- 1 tab(s) by mouth once a day (at bedtime) as directed  -- Do not take this drug if you are pregnant.  It is very important that you take or use this exactly as directed.  Do not skip doses or discontinue unless directed by your doctor.  Obtain medical advice before taking any non-prescription drugs as some may affect the action of this medication.    -- Indication: For A Fib    atorvastatin 80 mg oral tablet  -- 1 tab(s) by mouth once a day (at bedtime)  -- Indication: For Cholesterol    budesonide-formoterol 160 mcg-4.5 mcg/inh inhalation aerosol  -- 2 puff(s) inhaled 2 times a day  -- Indication: For CoPD    DOBUTamine 1 mg/mL-D5% intravenous solution  -- 7.4 mcg/kg intravenous once a day  ( Continuously)   -- Indication: For ChF    docusate sodium 100 mg oral capsule  -- 1 cap(s) by mouth once a day  -- Indication: For Constipation, unspecified constipation type    polyethylene glycol 3350 oral powder for reconstitution  -- 17 gram(s) by mouth once a day  Do Not take if you have loose stool  -- Indication: For Constipation, unspecified constipation type    senna oral tablet  -- 2 tab(s) by mouth once a day (at bedtime), As needed, Constipation  -- Indication: For Constipation, unspecified constipation type    midodrine 10 mg oral tablet  -- 1 tab(s) by mouth every 8 hours  -- Indication: For Chronic hypotension    Renvela 800 mg oral tablet  -- 1 tab(s) by mouth 3 times a day (with meals)  -- Indication: For supplement    Synthroid 75 mcg (0.075 mg) oral tablet  -- 1 tab(s) by mouth once a day  -- Indication: For Hypothyroidism I will START or STAY ON the medications listed below when I get home from the hospital:    finasteride 5 mg oral tablet  -- 1 tab(s) by mouth once a day  -- Indication: For BPH    Ecotrin Adult Low Strength 81 mg oral delayed release tablet  -- 1 tab(s) by mouth once a day  -- Indication: For Cardioprotective    amiodarone 200 mg oral tablet  -- 1 tab(s) by mouth once a day  -- Indication: For ChF    Coumadin 5 mg oral tablet  -- 1 tab(s) by mouth once a day (at bedtime) as directed  -- Do not take this drug if you are pregnant.  It is very important that you take or use this exactly as directed.  Do not skip doses or discontinue unless directed by your doctor.  Obtain medical advice before taking any non-prescription drugs as some may affect the action of this medication.    -- Indication: For A Fib    atorvastatin 80 mg oral tablet  -- 1 tab(s) by mouth once a day (at bedtime)  -- Indication: For Cholesterol    budesonide-formoterol 160 mcg-4.5 mcg/inh inhalation aerosol  -- 2 puff(s) inhaled 2 times a day  -- Indication: For CoPD    DOBUTamine 1 mg/mL-D5% intravenous solution  -- 7.4 mcg/kg intravenous once a day  ( Continuously)   -- Indication: For ChF    docusate sodium 100 mg oral capsule  -- 1 cap(s) by mouth once a day  -- Indication: For Constipation, unspecified constipation type    polyethylene glycol 3350 oral powder for reconstitution  -- 17 gram(s) by mouth once a day  Do Not take if you have loose stool  -- Indication: For Constipation, unspecified constipation type    senna oral tablet  -- 2 tab(s) by mouth once a day (at bedtime), As needed, Constipation  -- Indication: For Constipation, unspecified constipation type    midodrine 10 mg oral tablet  -- 2 tab(s) by mouth 3 times a day   -- It is very important that you take or use this exactly as directed.  Do not skip doses or discontinue unless directed by your doctor.  Obtain medical advice before taking any non-prescription drugs as some may affect the action of this medication.    -- Indication: For Hypotension    Renvela 800 mg oral tablet  -- 1 tab(s) by mouth 3 times a day (with meals)  -- Indication: For supplement    Synthroid 75 mcg (0.075 mg) oral tablet  -- 1 tab(s) by mouth once a day  -- Indication: For Hypothyroidism

## 2017-11-01 NOTE — PROGRESS NOTE ADULT - PROBLEM SELECTOR PLAN 4
- Pt c/o 2 weeks of LUE pain due to his PICC line placement   - has difficulty with elevating his left arm  - tender to palpation, but no sign of swelling or erythema   - PICC line sites no sign of erythema or drainage    - Duplex US of the left upper ext. to r/o clot or phlebitis
- Pt c/o 2 weeks of LUE pain due to his PICC line placement   - has difficulty with elevating his left arm  - tender to palpation, but no sign of swelling or erythema   - PICC line sites no sign of erythema or drainage    - Duplex US of the left upper ext neg for dvt
- Pt c/o 2 weeks of LUE pain due to his PICC line placement   - has difficulty with elevating his left arm  - tender to palpation, but no sign of swelling or erythema   - PICC line sites no sign of erythema or drainage    - Duplex US of the left upper ext neg for dvt
- Pt c/o 2 weeks of LUE pain due to his PICC line placement   - has difficulty with elevating his left arm  - tender to palpation, but no sign of swelling or erythema   - PICC line sites no sign of erythema or drainage    - Duplex US of the left upper ext. to r/o clot or phlebitis

## 2017-11-01 NOTE — PROGRESS NOTE ADULT - SUBJECTIVE AND OBJECTIVE BOX
Pt seen and examined during dialysis/  Tolerating HD. BP steady during treatment, with UF goal 2.5kg.   NO acute events overnight.     Allergies:  No Known Allergies    Hospital Medications:   MEDICATIONS  (STANDING):  amiodarone    Tablet 200 milliGRAM(s) Oral daily  aspirin enteric coated 81 milliGRAM(s) Oral daily  atorvastatin 80 milliGRAM(s) Oral at bedtime  buDESOnide 160 MICROgram(s)/formoterol 4.5 MICROgram(s) Inhaler 2 Puff(s) Inhalation two times a day  DOBUTamine Infusion 7.494 MICROgram(s)/kG/Min (17.85 mL/Hr) IV Continuous <Continuous>  docusate sodium 100 milliGRAM(s) Oral three times a day  finasteride 5 milliGRAM(s) Oral daily  influenza   Vaccine 0.5 milliLiter(s) IntraMuscular once  insulin lispro (HumaLOG) corrective regimen sliding scale   SubCutaneous three times a day before meals  insulin lispro (HumaLOG) corrective regimen sliding scale   SubCutaneous at bedtime  levothyroxine 75 MICROGram(s) Oral daily  midodrine 10 milliGRAM(s) Oral every 8 hours  polyethylene glycol 3350 17 Gram(s) Oral daily  sevelamer hydrochloride 800 milliGRAM(s) Oral three times a day with meals    VITALS:  T(F): 97.7 (11-01-17 @ 07:10), Max: 98 (10-31-17 @ 11:42)  HR: 79 (11-01-17 @ 07:10)  BP: 100/59 (11-01-17 @ 07:10)  RR: 17 (11-01-17 @ 05:47)  SpO2: 97% (11-01-17 @ 05:47)  Wt(kg): --      PHYSICAL EXAM:  Constitutional: NAD  HEENT: anicteric sclera, oropharynx clear, MMM  Neck: No JVD  Respiratory: CTAB, no wheezes, rales or rhonchi  Cardiovascular: S1, S2, RRR  Gastrointestinal: BS+, soft, NT/ND  Extremities: No cyanosis or clubbing. No peripheral edema  Neurological: A/O x 3, no focal deficits  Psychiatric: Normal mood, normal affect  : No CVA tenderness. No rosa.   Skin: No rashes  Vascular Access: RIJ tunneled cath     LABS:  11-01    136  |  96<L>  |  43<H>  ----------------------------<  92  4.6   |  26  |  6.12<H>    Ca    8.8      01 Nov 2017 07:46  Mg     2.1     11-01      Creatinine Trend: 6.12 <--, 5.22 <--, 4.31 <--, 6.38 <--, 5.19 <--, 4.53 <--                        9.9    5.86  )-----------( 98       ( 01 Nov 2017 07:46 )             31.8     Urine Studies:      RADIOLOGY & ADDITIONAL STUDIES:

## 2017-11-01 NOTE — PROGRESS NOTE ADULT - PROBLEM SELECTOR PLAN 5
- TTE from last admission on 10/18 showed EF of 21% with severe global LV dysfunction  - c/w Dobutamine drip at 7mcg/kg/min  - Cards consult noted,   d/c today, left messages with pt's spouse, d/c planning time spent 45 mts

## 2017-11-01 NOTE — PROGRESS NOTE ADULT - PROVIDER SPECIALTY LIST ADULT
Cardiology
Hospitalist
Nephrology
Heart Failure

## 2017-11-01 NOTE — PROGRESS NOTE ADULT - PROBLEM SELECTOR PROBLEM 8
Constipation, unspecified constipation type

## 2017-11-01 NOTE — PROGRESS NOTE ADULT - SUBJECTIVE AND OBJECTIVE BOX
Patient is a 62y old  Male who presents with a chief complaint of dizziness, Left ARM pain , Abdominal pain (01 Nov 2017 12:09)      SUBJECTIVE / OVERNIGHT EVENTS: Pt was seen and examined at 1110am, no overnight events, pt does not want to have RHC as per the advise of his cardiologist, still with mild sob, no chest pain, no other complaints.    MEDICATIONS  (STANDING):  amiodarone    Tablet 200 milliGRAM(s) Oral daily  aspirin enteric coated 81 milliGRAM(s) Oral daily  atorvastatin 80 milliGRAM(s) Oral at bedtime  buDESOnide 160 MICROgram(s)/formoterol 4.5 MICROgram(s) Inhaler 2 Puff(s) Inhalation two times a day  DOBUTamine Infusion 7.494 MICROgram(s)/kG/Min (17.85 mL/Hr) IV Continuous <Continuous>  docusate sodium 100 milliGRAM(s) Oral three times a day  finasteride 5 milliGRAM(s) Oral daily  influenza   Vaccine 0.5 milliLiter(s) IntraMuscular once  insulin lispro (HumaLOG) corrective regimen sliding scale   SubCutaneous three times a day before meals  insulin lispro (HumaLOG) corrective regimen sliding scale   SubCutaneous at bedtime  levothyroxine 75 MICROGram(s) Oral daily  midodrine 10 milliGRAM(s) Oral every 8 hours  polyethylene glycol 3350 17 Gram(s) Oral daily  sevelamer hydrochloride 800 milliGRAM(s) Oral three times a day with meals    MEDICATIONS  (PRN):  acetaminophen   Tablet. 650 milliGRAM(s) Oral every 6 hours PRN Mild Pain (1 - 3)  ondansetron Injectable 4 milliGRAM(s) IV Push every 6 hours PRN Nausea and/or Vomiting  ondansetron Injectable 4 milliGRAM(s) IV Push every 8 hours PRN Nausea and/or Vomiting  senna 2 Tablet(s) Oral at bedtime PRN Constipation      Vital Signs Last 24 Hrs  T(C): 36.4 (01 Nov 2017 11:30), Max: 36.7 (31 Oct 2017 18:12)  T(F): 97.5 (01 Nov 2017 11:30), Max: 98 (31 Oct 2017 18:12)  HR: 77 (01 Nov 2017 11:30) (73 - 82)  BP: 92/59 (01 Nov 2017 11:30) (84/51 - 100/60)  BP(mean): --  RR: 18 (01 Nov 2017 11:30) (16 - 18)  SpO2: 99% (01 Nov 2017 11:30) (95% - 99%)  CAPILLARY BLOOD GLUCOSE  108 (31 Oct 2017 21:53)  211 (31 Oct 2017 16:50)      POCT Blood Glucose.: 82 mg/dL (01 Nov 2017 11:53)  POCT Blood Glucose.: 88 mg/dL (01 Nov 2017 08:40)  POCT Blood Glucose.: 94 mg/dL (01 Nov 2017 05:45)    I&O's Summary    01 Nov 2017 07:01  -  01 Nov 2017 13:53  --------------------------------------------------------  IN: 400 mL / OUT: 2400 mL / NET: -2000 mL        PHYSICAL EXAM:  GENERAL: NAD, well-developed  CHEST/LUNG: Clear to auscultation bilaterally; No wheeze  HEART: Regular rate and rhythm  GI: soft, non tender non distended  EXTREMITIES:   No LE edema  PSYCH: calm  NEUROLOGY: AAOx3  SKIN: No rashes or lesions    LABS:                        9.9    5.86  )-----------( 98       ( 01 Nov 2017 07:46 )             31.8     11-01    136  |  96<L>  |  43<H>  ----------------------------<  92  4.6   |  26  |  6.12<H>    Ca    8.8      01 Nov 2017 07:46  Mg     2.1     11-01      PT/INR - ( 01 Nov 2017 07:46 )   PT: 19.3 SEC;   INR: 1.70                    RADIOLOGY & ADDITIONAL TESTS:    Imaging Personally Reviewed:    Consultant(s) Notes Reviewed:      Care Discussed with Consultants/Other Providers:

## 2017-11-01 NOTE — DISCHARGE NOTE ADULT - SECONDARY DIAGNOSIS.
Chronic hypotension Chronic systolic congestive heart failure ESRD (end stage renal disease) on dialysis AF (atrial fibrillation) DM (diabetes mellitus)

## 2017-11-01 NOTE — DISCHARGE NOTE ADULT - PATIENT PORTAL LINK FT
“You can access the Follow[employer] Patient Portal, offered by [facility], by registering with the following website: [email or web address]”

## 2017-11-01 NOTE — DISCHARGE NOTE ADULT - MEDICATION SUMMARY - MEDICATIONS TO CHANGE
I will SWITCH the dose or number of times a day I take the medications listed below when I get home from the hospital:    midodrine 10 mg oral tablet  -- 3 tab(s) by mouth every 8 hours

## 2017-11-01 NOTE — PROGRESS NOTE ADULT - SUBJECTIVE AND OBJECTIVE BOX
Patient is well known to me as an outpatient and multiple admissions for CHF.  He is presently undergoing HD and feels mildly improved  He denies orthopnea but has [place] on minimal exertion  /59 HR 79  IV Dobutamine @ 7.5 mcg/kg/min  Lungs bilateral crackles  RR 1/6 systolic murmur  Trace-1+ edema    WBC 5.86 Hgb 9.9  Hct 31.8  Plt 98K  INR 1.7    Imp: Patient has a known non ischemic cardiomyopathy with EF 10% that has not responded to any modality other than HD and fluid removal His BP has not tolerated Beta blockers, ACE/ARB, Spironolactone.  He requires Midodrine for BP support.  He usual BP is 90-95 systolic  At present he is on 10 mg tid.  normally he takes 20-30 mg tid.  He is close to baseline in terms of fluid status  Rec: Increase the Midodrine to 20 mg tid and attempt greater fluid removal with HD with either longer or more frequent sessions.  His BP will not tolerate more intense fluid removal.  I feel there is no information to be gained by a S-G catheterization  Coumadin to acheive an INR of 2-2.0

## 2017-11-01 NOTE — PROGRESS NOTE ADULT - PROBLEM SELECTOR PROBLEM 1
Dizziness
ESRD (end stage renal disease) on dialysis
Dizziness

## 2017-11-01 NOTE — DISCHARGE NOTE ADULT - CARE PLAN
Principal Discharge DX:	Dizziness  Goal:	Likely secondary to poor perfusion  Instructions for follow-up, activity and diet:	Follow up with your cardiologist in 1-2 weeks  Secondary Diagnosis:	Chronic hypotension  Goal:	Compliance with Midocrine  Secondary Diagnosis:	Chronic systolic congestive heart failure  Goal:	Compliance with Dobutamine Gtt  Instructions for follow-up, activity and diet:	Follow up with Your Cardiologist in 1 week  Secondary Diagnosis:	ESRD (end stage renal disease) on dialysis  Goal:	Compliance with HD  Instructions for follow-up, activity and diet:	REsume prior HD Schedule  Secondary Diagnosis:	AF (atrial fibrillation)  Goal:	Compliance with Coumadin  Secondary Diagnosis:	DM (diabetes mellitus)

## 2017-11-01 NOTE — PROGRESS NOTE ADULT - PROBLEM SELECTOR PROBLEM 2
Elevated troponin
Hyperphosphatemia
Elevated troponin

## 2017-11-01 NOTE — PROGRESS NOTE ADULT - ASSESSMENT
61 y/o male w/ PMHX of NICM, HFrEF (LVEF 21%, LVIDd 6.2 cm, w/ AICD) on chronic home dobutamine 7.5 mcg/kg/min, ESRD on HD, HLD, DM II, a.fib on coumadin, interstitial pulmonary lung disease on chronic home O2, hypotension on midodrine comes in with complaints of dizziness and SOB x 2 weeks. Appears volume overloaded on exam in particular R sided pressures; difficult to assess if L sided pressures elevated given ILD. Given complexity, recommend [place] and tailor therapy accordingly. Has Stage D HF, NYHA class IV symptoms; unfortunately not a candidate for advanced therapies given ESRD and severe ILD on imaging.   - pt will consider [place] tomorrow  - continue  7.5 mcg/kg/min for now   - standing weights daily  - continue HD as tolerated
#End stage NICM ef 10% on home dobutamine drip. Elevated troponin non specific in setting esrd.  #ICD  #ESRD on HD  #PAF  #Pulmonary fibrosis  CHF team input noted; with respect to suggestion PA catheter, will review with Dr Davis.
61 YO male with ESRD on HD (MWF), NICM with severe LV dysfnxn (EF 21%), biotronic ICD, on home dobutamine drip, Afib on coumadin, COPD on 3-4L home o2, DM a/w SOB, associated with N/V, r/o ACS. Renal consult for HD management.
61 YO male with ESRD on HD (MWF), NICM with severe LV dysfnxn (EF 21%), biotronic ICD, on home dobutamine drip, Afib on coumadin, COPD on 3-4L home o2, DM a/w SOB, associated with N/V, r/o ACS. Renal consult for HD management.
62 y.o. male with ESRD on HD (MWF), NICM with severe LV dysfnxn (EF 19%), biotronic ICD, Seizure (off Keppra since 7/2017), L upper arm PICC with home dobutamine drip, afib on coumadin, COPD on 3-4L home o2, hld, DM, and hypotension on midodrine presenting with multiple complaints most likely due to the patients chronic low flow state from advance HF with reduced EF with a reported elevated INR on outpatient labs, which is now corrected.
62 y.o. male with ESRD on HD (MWF), NICM with severe LV dysfnxn (EF 19%), biotronic ICD, Seizure (off Keppra since 7/2017), L upper arm PICC with home dobutamine drip, afib on coumadin, COPD on 3-4L home o2, hld, DM, and hypotension on midodrine presenting with multiple complaints most likely due to the patients chronic low flow state from advance HF with reduced EF with a reported elevated INR on outpatient labs, which is now corrected.   Continue dobutamine  Await CHF input
63 YO male with ESRD on HD (MWF), NICM with severe LV dysfnxn (EF 21%), biotronic ICD, on home dobutamine drip, Afib on coumadin, COPD on 3-4L home o2, DM a/w SOB, associated with N/V, r/o ACS. Renal consult for HD management.
62 y.o. male with ESRD on HD (MWF), NICM with severe LV dysfnxn (EF 19%), biotronic ICD, Seizure (off Keppra since 7/2017), L upper arm PICC with home dobutamine drip, afib on coumadin, COPD on 3-4L home o2, hld, DM, and hypotension on midodrine presenting with multiple complaints most likely due to the patients chronic low flow state from advance HF with reduced EF with a reported elevated INR on outpatient labs, which is now corrected.

## 2017-11-01 NOTE — DISCHARGE NOTE ADULT - HOSPITAL COURSE
62 y.o. male with ESRD on HD (MWF), NICM with severe LV dysfnxn (EF 21%), biotronic ICD, L upper arm PICC with home dobutamine drip, afib on coumadin, COPD on 3-4L home o2, hld, DM, and hypotension on midodrine presenting with dizziness and elevated INR on outpatient labs. 62 y.o. male with ESRD on HD (MWF), NICM with severe LV dysfnxn (EF 21%), biotronic ICD, L upper arm PICC with home dobutamine drip, afib on coumadin, COPD on 3-4L home o2, hld, DM, and hypotension on midodrine presenting with dizziness and elevated INR on outpatient labs.    On admission:  CE 0.36  CT head: neg  EKG: NSR  Unsure if outpatient INR was correct with reading of 8  10/29 Med: Dizziness- Similar episode occurred on previous admission, the patient had extensive work up which was negative for any new changes.   - low BP in the setting of severely reduced EF, dizziness most likely due to poor perfusion   - Continue with dobutamine infusion and midodrine 10mg TID   -  Left arm pain, s/p Duplex US of the left upper ext..:No evidence of deep venous thrombosis in the LUE     s/p CT abd/pelvis:  No bowel obstruction. Interstitial lung disease. 8 mm nodular opacity at the right lung base without change. Consider  continued follow-up  Seen by HF team: pt w /dizziness and SOB x 2 weeks. Appears volume overloaded on exam in particular R sided pressures; difficult to assess if L sided pressures elevated given ILD. Given complexity, recommend [place] and tailor therapy accordingly. Has Stage D HF, NYHA class IV symptoms; unfortunately not a candidate for advanced therapies given ESRD and severe ILD on imaging.   However Pt Refused the Right Heart cath, Case was discussed with attending, Pt is stable for Discharge Home with Dobutamine Gtt 62 y.o. male with ESRD on HD (MWF), NICM with severe LV dysfnxn (EF 21%), biotronic ICD, L upper arm PICC with home dobutamine drip, afib on coumadin, COPD on 3-4L home o2, hld, DM, and hypotension on midodrine presenting with dizziness and elevated INR on outpatient labs.    On admission:  CE 0.36  CT head: neg  EKG: NSR  Pt Noted with INR of 8, Unsure if outpatient INR was correct   Pt Had Similar episode of dizziness on previous admission, the patient had extensive work up which was negative for any new changes. Dizziness is most likely due to poor perfusion   Continue with dobutamine infusion and midodrine 10mg TID   s/p Duplex US of the left upper ext due to left arn pain..:No evidence of deep venous thrombosis in the LUE     s/p CT abd/pelvis:  No bowel obstruction. Interstitial lung disease. 8 mm nodular opacity at the right lung base without change. Consider  continued follow-up  Seen by HF team: pt w /dizziness and SOB x 2 weeks. Appears volume overloaded on exam in particular R sided pressures; difficult to assess if L sided pressures elevated given ILD. Given complexity, recommend [place] and tailor therapy accordingly. Has Stage D HF, NYHA class IV symptoms; unfortunately not a candidate for advanced therapies given ESRD and severe ILD on imaging.   However Pt Refused the Right Heart cath, Case was discussed with attending, Pt is stable for Discharge Home with Dobutamine Gtt 62 y.o. male with ESRD on HD (MWF), NICM with severe LV dysfnxn (EF 21%), biotronic ICD, L upper arm PICC with home dobutamine drip, afib on coumadin, COPD on 3-4L home o2, hld, DM, and hypotension on midodrine presenting with dizziness and elevated INR on outpatient labs.    On admission:  CE 0.36  CT head: neg  EKG: NSR  Pt Noted with INR of 8, Unsure if outpatient INR was correct   Pt Had Similar episode of dizziness on previous admission, the patient had extensive work up which was negative for any new changes. Dizziness is most likely due to poor perfusion   Continue with dobutamine infusion and midodrine 10mg TID   s/p Duplex US of the left upper ext due to left arn pain..:No evidence of deep venous thrombosis in the LUE     s/p CT abd/pelvis:  No bowel obstruction. Interstitial lung disease. 8 mm nodular opacity at the right lung base without change. Consider  continued follow-up  Seen by HF team: pt w /dizziness and SOB x 2 weeks. Appears volume overloaded on exam in particular R sided pressures; difficult to assess if L sided pressures elevated given ILD. Given complexity, recommend [place] and tailor therapy accordingly. Has Stage D HF, NYHA class IV symptoms; unfortunately not a candidate for advanced therapies given ESRD and severe ILD on imaging.   However Pt Refused the Right Heart cath, Case was discussed with attending, Pt is stable for Discharge Home with Dobutamine Gtt  11/2 Discussed with LAYO Menon who reported that she changed to the prescription of midodrine to 20mg and contacted the patient and instructed to take 20mg tid as recommended by pt's cardiologist.

## 2017-11-01 NOTE — DISCHARGE NOTE ADULT - PLAN OF CARE
Likely secondary to poor perfusion Follow up with your cardiologist in 1-2 weeks Compliance with Midocrine Compliance with Dobutamine Gtt Follow up with Your Cardiologist in 1 week Compliance with HD REsume prior HD Schedule Compliance with Coumadin

## 2017-11-02 RX ORDER — MIDODRINE HYDROCHLORIDE 2.5 MG/1
3 TABLET ORAL
Qty: 0 | Refills: 0 | COMMUNITY
Start: 2017-11-02 | End: 2017-12-02

## 2017-11-02 RX ORDER — MIDODRINE HYDROCHLORIDE 2.5 MG/1
2 TABLET ORAL
Qty: 180 | Refills: 0 | OUTPATIENT
Start: 2017-11-02 | End: 2017-12-02

## 2018-01-23 ENCOUNTER — INPATIENT (INPATIENT)
Facility: HOSPITAL | Age: 63
LOS: 7 days | Discharge: HOME | End: 2018-01-31
Attending: HOSPITALIST | Admitting: HOSPITALIST
Payer: COMMERCIAL

## 2018-01-23 VITALS
TEMPERATURE: 98 F | SYSTOLIC BLOOD PRESSURE: 88 MMHG | RESPIRATION RATE: 18 BRPM | OXYGEN SATURATION: 96 % | HEART RATE: 80 BPM | DIASTOLIC BLOOD PRESSURE: 50 MMHG

## 2018-01-23 DIAGNOSIS — I95.89 OTHER HYPOTENSION: ICD-10-CM

## 2018-01-23 DIAGNOSIS — E11.9 TYPE 2 DIABETES MELLITUS WITHOUT COMPLICATIONS: ICD-10-CM

## 2018-01-23 DIAGNOSIS — I48.91 UNSPECIFIED ATRIAL FIBRILLATION: ICD-10-CM

## 2018-01-23 DIAGNOSIS — J44.9 CHRONIC OBSTRUCTIVE PULMONARY DISEASE, UNSPECIFIED: ICD-10-CM

## 2018-01-23 DIAGNOSIS — N18.6 END STAGE RENAL DISEASE: ICD-10-CM

## 2018-01-23 DIAGNOSIS — B97.81 HUMAN METAPNEUMOVIRUS AS THE CAUSE OF DISEASES CLASSIFIED ELSEWHERE: ICD-10-CM

## 2018-01-23 DIAGNOSIS — Z29.9 ENCOUNTER FOR PROPHYLACTIC MEASURES, UNSPECIFIED: ICD-10-CM

## 2018-01-23 DIAGNOSIS — I50.9 HEART FAILURE, UNSPECIFIED: ICD-10-CM

## 2018-01-23 DIAGNOSIS — R19.7 DIARRHEA, UNSPECIFIED: ICD-10-CM

## 2018-01-23 DIAGNOSIS — N40.0 BENIGN PROSTATIC HYPERPLASIA WITHOUT LOWER URINARY TRACT SYMPTOMS: ICD-10-CM

## 2018-01-23 LAB
ALBUMIN SERPL ELPH-MCNC: 3.2 G/DL — LOW (ref 3.3–5)
ALP SERPL-CCNC: 183 U/L — HIGH (ref 40–120)
ALT FLD-CCNC: 26 U/L — SIGNIFICANT CHANGE UP (ref 4–41)
APTT BLD: 46.6 SEC — HIGH (ref 27.5–37.4)
AST SERPL-CCNC: 32 U/L — SIGNIFICANT CHANGE UP (ref 4–40)
B PERT DNA SPEC QL NAA+PROBE: SIGNIFICANT CHANGE UP
BASE EXCESS BLDV CALC-SCNC: 2.6 MMOL/L — SIGNIFICANT CHANGE UP
BASOPHILS # BLD AUTO: 0.03 K/UL — SIGNIFICANT CHANGE UP (ref 0–0.2)
BASOPHILS NFR BLD AUTO: 0.5 % — SIGNIFICANT CHANGE UP (ref 0–2)
BILIRUB SERPL-MCNC: 0.5 MG/DL — SIGNIFICANT CHANGE UP (ref 0.2–1.2)
BLOOD GAS VENOUS - CREATININE: 4.78 MG/DL — HIGH (ref 0.5–1.3)
BUN SERPL-MCNC: 23 MG/DL — SIGNIFICANT CHANGE UP (ref 7–23)
C PNEUM DNA SPEC QL NAA+PROBE: NOT DETECTED — SIGNIFICANT CHANGE UP
CALCIUM SERPL-MCNC: 8.7 MG/DL — SIGNIFICANT CHANGE UP (ref 8.4–10.5)
CHLORIDE BLDV-SCNC: 99 MMOL/L — SIGNIFICANT CHANGE UP (ref 96–108)
CHLORIDE SERPL-SCNC: 96 MMOL/L — LOW (ref 98–107)
CO2 SERPL-SCNC: 25 MMOL/L — SIGNIFICANT CHANGE UP (ref 22–31)
CREAT SERPL-MCNC: 4.83 MG/DL — HIGH (ref 0.5–1.3)
EOSINOPHIL # BLD AUTO: 0.07 K/UL — SIGNIFICANT CHANGE UP (ref 0–0.5)
EOSINOPHIL NFR BLD AUTO: 1.1 % — SIGNIFICANT CHANGE UP (ref 0–6)
FLUAV H1 2009 PAND RNA SPEC QL NAA+PROBE: NOT DETECTED — SIGNIFICANT CHANGE UP
FLUAV H1 RNA SPEC QL NAA+PROBE: NOT DETECTED — SIGNIFICANT CHANGE UP
FLUAV H3 RNA SPEC QL NAA+PROBE: NOT DETECTED — SIGNIFICANT CHANGE UP
FLUAV SUBTYP SPEC NAA+PROBE: SIGNIFICANT CHANGE UP
FLUBV RNA SPEC QL NAA+PROBE: NOT DETECTED — SIGNIFICANT CHANGE UP
GAS PNL BLDV: 135 MMOL/L — LOW (ref 136–146)
GLUCOSE BLDV-MCNC: 112 — HIGH (ref 70–99)
GLUCOSE SERPL-MCNC: 101 MG/DL — HIGH (ref 70–99)
HADV DNA SPEC QL NAA+PROBE: NOT DETECTED — SIGNIFICANT CHANGE UP
HCO3 BLDV-SCNC: 25 MMOL/L — SIGNIFICANT CHANGE UP (ref 20–27)
HCOV 229E RNA SPEC QL NAA+PROBE: NOT DETECTED — SIGNIFICANT CHANGE UP
HCOV HKU1 RNA SPEC QL NAA+PROBE: NOT DETECTED — SIGNIFICANT CHANGE UP
HCOV NL63 RNA SPEC QL NAA+PROBE: NOT DETECTED — SIGNIFICANT CHANGE UP
HCOV OC43 RNA SPEC QL NAA+PROBE: NOT DETECTED — SIGNIFICANT CHANGE UP
HCT VFR BLD CALC: 41.5 % — SIGNIFICANT CHANGE UP (ref 39–50)
HCT VFR BLDV CALC: 41.7 % — SIGNIFICANT CHANGE UP (ref 39–51)
HGB BLD-MCNC: 12.7 G/DL — LOW (ref 13–17)
HGB BLDV-MCNC: 13.6 G/DL — SIGNIFICANT CHANGE UP (ref 13–17)
HMPV RNA SPEC QL NAA+PROBE: POSITIVE — HIGH
HPIV1 RNA SPEC QL NAA+PROBE: NOT DETECTED — SIGNIFICANT CHANGE UP
HPIV2 RNA SPEC QL NAA+PROBE: NOT DETECTED — SIGNIFICANT CHANGE UP
HPIV3 RNA SPEC QL NAA+PROBE: NOT DETECTED — SIGNIFICANT CHANGE UP
HPIV4 RNA SPEC QL NAA+PROBE: NOT DETECTED — SIGNIFICANT CHANGE UP
IMM GRANULOCYTES # BLD AUTO: 0.03 # — SIGNIFICANT CHANGE UP
IMM GRANULOCYTES NFR BLD AUTO: 0.5 % — SIGNIFICANT CHANGE UP (ref 0–1.5)
INR BLD: 3 — HIGH (ref 0.88–1.17)
LACTATE BLDV-MCNC: 2.2 MMOL/L — HIGH (ref 0.5–2)
LYMPHOCYTES # BLD AUTO: 0.95 K/UL — LOW (ref 1–3.3)
LYMPHOCYTES # BLD AUTO: 15.2 % — SIGNIFICANT CHANGE UP (ref 13–44)
M PNEUMO DNA SPEC QL NAA+PROBE: NOT DETECTED — SIGNIFICANT CHANGE UP
MCHC RBC-ENTMCNC: 30.2 PG — SIGNIFICANT CHANGE UP (ref 27–34)
MCHC RBC-ENTMCNC: 30.6 % — LOW (ref 32–36)
MCV RBC AUTO: 98.6 FL — SIGNIFICANT CHANGE UP (ref 80–100)
MONOCYTES # BLD AUTO: 0.87 K/UL — SIGNIFICANT CHANGE UP (ref 0–0.9)
MONOCYTES NFR BLD AUTO: 13.9 % — SIGNIFICANT CHANGE UP (ref 2–14)
NEUTROPHILS # BLD AUTO: 4.3 K/UL — SIGNIFICANT CHANGE UP (ref 1.8–7.4)
NEUTROPHILS NFR BLD AUTO: 68.8 % — SIGNIFICANT CHANGE UP (ref 43–77)
NRBC # FLD: 0.02 — SIGNIFICANT CHANGE UP
PCO2 BLDV: 55 MMHG — HIGH (ref 41–51)
PH BLDV: 7.33 PH — SIGNIFICANT CHANGE UP (ref 7.32–7.43)
PLATELET # BLD AUTO: 116 K/UL — LOW (ref 150–400)
PMV BLD: 12 FL — SIGNIFICANT CHANGE UP (ref 7–13)
PO2 BLDV: 32 MMHG — LOW (ref 35–40)
POTASSIUM BLDV-SCNC: 3.6 MMOL/L — SIGNIFICANT CHANGE UP (ref 3.4–4.5)
POTASSIUM SERPL-MCNC: 3.8 MMOL/L — SIGNIFICANT CHANGE UP (ref 3.5–5.3)
POTASSIUM SERPL-SCNC: 3.8 MMOL/L — SIGNIFICANT CHANGE UP (ref 3.5–5.3)
PROT SERPL-MCNC: 8 G/DL — SIGNIFICANT CHANGE UP (ref 6–8.3)
PROTHROM AB SERPL-ACNC: 35.3 SEC — HIGH (ref 9.8–13.1)
RBC # BLD: 4.21 M/UL — SIGNIFICANT CHANGE UP (ref 4.2–5.8)
RBC # FLD: 17 % — HIGH (ref 10.3–14.5)
RSV RNA SPEC QL NAA+PROBE: NOT DETECTED — SIGNIFICANT CHANGE UP
RV+EV RNA SPEC QL NAA+PROBE: NOT DETECTED — SIGNIFICANT CHANGE UP
SAO2 % BLDV: 52.2 % — LOW (ref 60–85)
SODIUM SERPL-SCNC: 136 MMOL/L — SIGNIFICANT CHANGE UP (ref 135–145)
WBC # BLD: 6.25 K/UL — SIGNIFICANT CHANGE UP (ref 3.8–10.5)
WBC # FLD AUTO: 6.25 K/UL — SIGNIFICANT CHANGE UP (ref 3.8–10.5)

## 2018-01-23 PROCEDURE — 71045 X-RAY EXAM CHEST 1 VIEW: CPT | Mod: 26

## 2018-01-23 PROCEDURE — 99223 1ST HOSP IP/OBS HIGH 75: CPT | Mod: AI,GC

## 2018-01-23 RX ORDER — DOBUTAMINE HCL 250MG/20ML
10.02 VIAL (ML) INTRAVENOUS
Qty: 1000 | Refills: 0 | Status: DISCONTINUED | OUTPATIENT
Start: 2018-01-23 | End: 2018-01-23

## 2018-01-23 RX ORDER — MIDODRINE HYDROCHLORIDE 2.5 MG/1
30 TABLET ORAL EVERY 8 HOURS
Qty: 0 | Refills: 0 | Status: DISCONTINUED | OUTPATIENT
Start: 2018-01-23 | End: 2018-01-31

## 2018-01-23 RX ORDER — LEVOTHYROXINE SODIUM 125 MCG
75 TABLET ORAL DAILY
Qty: 0 | Refills: 0 | Status: DISCONTINUED | OUTPATIENT
Start: 2018-01-23 | End: 2018-01-31

## 2018-01-23 RX ORDER — ATORVASTATIN CALCIUM 80 MG/1
80 TABLET, FILM COATED ORAL AT BEDTIME
Qty: 0 | Refills: 0 | Status: DISCONTINUED | OUTPATIENT
Start: 2018-01-23 | End: 2018-01-31

## 2018-01-23 RX ORDER — AMIODARONE HYDROCHLORIDE 400 MG/1
200 TABLET ORAL DAILY
Qty: 0 | Refills: 0 | Status: DISCONTINUED | OUTPATIENT
Start: 2018-01-23 | End: 2018-01-28

## 2018-01-23 RX ORDER — MIDODRINE HYDROCHLORIDE 2.5 MG/1
30 TABLET ORAL EVERY 8 HOURS
Qty: 0 | Refills: 0 | Status: DISCONTINUED | OUTPATIENT
Start: 2018-01-23 | End: 2018-01-23

## 2018-01-23 RX ORDER — ASPIRIN/CALCIUM CARB/MAGNESIUM 324 MG
81 TABLET ORAL DAILY
Qty: 0 | Refills: 0 | Status: DISCONTINUED | OUTPATIENT
Start: 2018-01-23 | End: 2018-01-31

## 2018-01-23 RX ORDER — HEPARIN SODIUM 5000 [USP'U]/ML
5000 INJECTION INTRAVENOUS; SUBCUTANEOUS EVERY 8 HOURS
Qty: 0 | Refills: 0 | Status: DISCONTINUED | OUTPATIENT
Start: 2018-01-23 | End: 2018-01-23

## 2018-01-23 RX ORDER — DOBUTAMINE HCL 250MG/20ML
10.02 VIAL (ML) INTRAVENOUS
Qty: 500 | Refills: 0 | Status: DISCONTINUED | OUTPATIENT
Start: 2018-01-23 | End: 2018-01-23

## 2018-01-23 RX ORDER — IPRATROPIUM/ALBUTEROL SULFATE 18-103MCG
3 AEROSOL WITH ADAPTER (GRAM) INHALATION EVERY 6 HOURS
Qty: 0 | Refills: 0 | Status: DISCONTINUED | OUTPATIENT
Start: 2018-01-23 | End: 2018-01-30

## 2018-01-23 RX ORDER — FINASTERIDE 5 MG/1
5 TABLET, FILM COATED ORAL DAILY
Qty: 0 | Refills: 0 | Status: DISCONTINUED | OUTPATIENT
Start: 2018-01-23 | End: 2018-01-31

## 2018-01-23 RX ORDER — MIDODRINE HYDROCHLORIDE 2.5 MG/1
20 TABLET ORAL ONCE
Qty: 0 | Refills: 0 | Status: COMPLETED | OUTPATIENT
Start: 2018-01-23 | End: 2018-01-23

## 2018-01-23 RX ORDER — DOBUTAMINE HCL 250MG/20ML
10.02 VIAL (ML) INTRAVENOUS
Qty: 1000 | Refills: 0 | Status: DISCONTINUED | OUTPATIENT
Start: 2018-01-23 | End: 2018-01-31

## 2018-01-23 RX ADMIN — HEPARIN SODIUM 5000 UNIT(S): 5000 INJECTION INTRAVENOUS; SUBCUTANEOUS at 21:22

## 2018-01-23 RX ADMIN — Medication 3 MILLILITER(S): at 23:22

## 2018-01-23 RX ADMIN — MIDODRINE HYDROCHLORIDE 20 MILLIGRAM(S): 2.5 TABLET ORAL at 12:18

## 2018-01-23 RX ADMIN — Medication 10.4 MICROGRAM(S)/KG/MIN: at 21:19

## 2018-01-23 RX ADMIN — MIDODRINE HYDROCHLORIDE 30 MILLIGRAM(S): 2.5 TABLET ORAL at 20:15

## 2018-01-23 RX ADMIN — ATORVASTATIN CALCIUM 80 MILLIGRAM(S): 80 TABLET, FILM COATED ORAL at 21:22

## 2018-01-23 NOTE — H&P ADULT - PROBLEM SELECTOR PLAN 6
- HgbA1c 6.3, will repeat with am labs.   - Renal diet with consistent carbs. - patient with paroxysmal afib on coumadin. INR 3 at this admission. Dr. Davis recs appreciated. Holding coumadin dose in the setting of diarrhea.  - At home takes amiodarone 100mg PO daily  - Monitor on tele floor - patient with paroxysmal afib on coumadin. INR 3 at this admission. Dr. Davis recs appreciated. Holding coumadin dose tonight in the setting of diarrhea. Check INR daily and dose coumadin as appropriate  - At home takes amiodarone 200mg PO daily  - Monitor on tele floor

## 2018-01-23 NOTE — H&P ADULT - PROBLEM SELECTOR PLAN 2
- Gets dialysis in [facility]   - Nephrology recs appreciated. Continue with dialysis schedule.   - Renal diet ordered   - Continue to monitor on BMP  -Strict I/Os - Patient with 3 watery diarrhea bowel movement today. Has not had bowel movement in the ED. Denied nausea and vomiting. Has not changed diet. Likely due to bowel regiment and viral illness.  - Holding bowel regimen medications. CTM closely.

## 2018-01-23 NOTE — H&P ADULT - PROBLEM SELECTOR PLAN 10
- Moderate risk for DVT - subq heparin q8  - Renal diet with consistent carbs. - DVT ppx: Therapeutically anticoagulated with coumadin for afib  - Diet: Renal diet with consistent carbs.

## 2018-01-23 NOTE — ED PROVIDER NOTE - OBJECTIVE STATEMENT
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  No black or tarry stools.  c/o weakness generalized.  No recent antibiotics, no recent travelling.  Son with loose stools as well.  No abdominal pain.  Patients states he doesn't urinate any longer.  +cough since Sunday, +phlegm, no hemoptysis.  No nasal congestion.  No chest pain.      +chills yesterday, no fever  ESRD M, W, F 62yM w/pmhx ESRD (HD MWF), NICM (EF 21%), ICD, PICC line in place with home dobutamine drip, afib on coumadin, COPD on home O2 (4-5L), hypotension on midodrine presenting with vomiting x 2 days and 3 episodes of diarrhea x this morning. Pts son is at bedside, states pt has developed cough 3 days ago associated with increased shortness of breath. This morning pt had episode of hypotension where "his vision went black" and required increased oxygen. Associate+ chills. Denies fever, abdominal pain, chest pain, headache, recent travel, leg pain or swelling or any other concerns. Son with diarrhea at home this morning as well.    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  No black or tarry stools.  c/o weakness generalized.  No recent antibiotics, no recent travelling.  Son with loose stools as well.  No abdominal pain.  Patients states he doesn't urinate any longer.  +cough since Sunday, +phlegm, no hemoptysis.  No nasal congestion.  No chest pain.      +chills yesterday, no fever  ESRD M, W, F 62yM w/pmhx ESRD (HD MWF), NICM (EF 21%), ICD, PICC line in place with home dobutamine drip, afib on coumadin, COPD on home O2 (4-5L), hypotension on midodrine presenting with vomiting x 2 days and 3 episodes of diarrhea x this morning. Pts son is at bedside, states pt has developed cough 3 days ago associated with increased shortness of breath. This morning pt had episode of hypotension where "his vision went black" and required increased oxygen. Associate+ chills. Denies fever, abdominal pain, chest pain, headache, recent travel, leg pain or swelling or any other concerns. Son with diarrhea at home this morning as well.    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  No black or tarry stools.  c/o weakness generalized.  No recent antibiotics, no recent travelling.  Son with loose stools as well.  No abdominal pain.  Patients states he doesn't urinate any longer.  +cough since Sunday, +phlegm, no hemoptysis.  No nasal congestion.  No chest pain. SBP 70s upon EMS arrival started LR 500CC, 100cc infused and BP now in 90s.      +chills yesterday, no fever  ESRD M, W, F

## 2018-01-23 NOTE — H&P ADULT - PROBLEM SELECTOR PLAN 9
- Moderate risk for DVT - subq heparin q8  - Renal diet with consistent carbs. - continue with Finasteride

## 2018-01-23 NOTE — H&P ADULT - PROBLEM SELECTOR PLAN 7
- Continue with Midodrine 30mg q8   - Vitals q4 - HgbA1c 6.3 in oct, will repeat with am labs.   - Renal diet with consistent carbs.

## 2018-01-23 NOTE — H&P ADULT - PROBLEM SELECTOR PLAN 3
- patient with EF less than 10% as per Dr. Davis (cardiologist), further recs pending.   - Strict I/Os    - Monitor on telemetry. - patient with EF less than 10% as per Dr. Davis (cardiologist), further recs pending.   - Strict I/Os    - Monitor on telemetry  - Patient on fixed dose dobutamine gtt at home, patient rate currently 10.4 mL/hr of concentration 1000 mg/250 mL, which is 10 mcg/kg/min based on patient's current weight. Per Dr. Davis, should be 7.5 mcg/kg/min however patient states he has had significant weight loss recently, will continue current home dose for now and discuss with cardiology in AM - Gets dialysis in [facility]   - Nephrology recs appreciated. Continue with dialysis schedule.   - Renal diet ordered   - Continue to monitor on BMP  -Strict I/Os - Gets dialysis in [facility]   - Nephrology recs appreciated. Continue with dialysis schedule.   - Renal diet ordered   - Continue to monitor on BMP  - Strict I/Os

## 2018-01-23 NOTE — H&P ADULT - HISTORY OF PRESENT ILLNESS
62yM w/pmhx ESRD (HD MWF), NICM (EF 21%), ICD, PICC line in place with home dobutamine drip, afib on coumadin, COPD on home O2 (4-5L), hypotension on midodrine (follows with Dr. Davis) who presents for diarrhea and altered vision. Patient states he got dialysis on jan 22 with no complications, after dialysis he had mild lower back pain and sob which improved by itself. On Jan 23 in the morning, patient felt the need to poop, but did not. About 20 minutes later, he felt the need to go, stood up from his bed, and before reaching the bathroom he soiled himself, watery diarrhea non bloody. At that time his vision blacked out, with no dizziness, lightheadedness, no LOC. Regained vision with rest. Ambulance came, while coming downstairs he had two more watery diarrhea bowel movements. Patient endorsed he has a cough for the past 2 days, but no fever. Patient denied nausea, palpitations, chest pain, abdominal pain, focal weakness. He ambulates with a cane.     In the ED, T97.8, BP 88/50, HR 80, RR 18 saturating 96% on NC 4L. RVP+ HMPV. Midodrine 20mg x1. 62yM w/pmhx ESRD (HD MWF), NICM (EF 21%), ICD, PICC line in place with home dobutamine drip, afib on coumadin, COPD on home O2 (4-5L), hypotension on midodrine (follows with Dr. Davis) who presents for diarrhea and brief loss of vision. Patient states he got dialysis on jan 22 with no complications, after dialysis he had mild lower back pain and sob which improved by itself. On Jan 23 in the morning, patient felt the need to poop, but did not. About 20 minutes later, he felt the need to go, stood up from his bed, and before reaching the bathroom he soiled himself, watery diarrhea non bloody. At that time his vision blacked out, with no dizziness, lightheadedness, no LOC. Regained vision with rest. Ambulance came, while coming downstairs he had two more watery diarrhea bowel movements. Patient endorsed he has a cough for the past 2 days, but no fever. Patient denied nausea, palpitations, chest pain, abdominal pain, focal weakness. He ambulates with a cane.     In the ED, T97.8, BP 88/50, HR 80, RR 18 saturating 96% on NC 4L. RVP+ HMPV. Midodrine 20mg x1.

## 2018-01-23 NOTE — ED PROVIDER NOTE - PROGRESS NOTE DETAILS
LAYO Moreno: Spoke with hospitalist  who accepts this admission, pt agrees to admission LAYO Moreno: Patient given 500cc LR by EMS, finished these fluids here in the ED

## 2018-01-23 NOTE — H&P ADULT - NSHPLABSRESULTS_GEN_ALL_CORE
.Labs reviewed personally.                          12.7   6.25  )-----------( 116      ( 23 Jan 2018 11:15 )             41.5     Hgb Trend: 12.7<--  01-23    136  |  96<L>  |  23  ----------------------------<  101<H>  3.8   |  25  |  4.83<H>    Ca    8.7      23 Jan 2018 11:15    TPro  8.0  /  Alb  3.2<L>  /  TBili  0.5  /  DBili  x   /  AST  32  /  ALT  26  /  AlkPhos  183<H>  01-23    Creatinine Trend: 4.83<--  PT/INR - ( 23 Jan 2018 11:15 )   PT: 35.3 SEC;   INR: 3.00     PTT - ( 23 Jan 2018 11:15 )  PTT:46.6 SEC    Imaging reviewed personally.    Xray Chest 1 View AP- PORTABLE-Urgent (01.23.18 @ 11:43) >  IMPRESSION:  Stable previously seen tunneled right chest wall dual-lumen dialysis   catheter, left chest wall single-lead AICD, cardiomegaly, and scant   aortic arch calcifications.  Fine bilateral interstitial prominence again noted probably reflecting   fibrosis/chronic interstitial disease however component of superimposed   mild edema cannot be entirely excluded. High-resolution CT could be   obtained to further assess as warranted. No focal patchy airspace   consolidations. No pneumothorax.  Hazy indistinct bilateral CP angles could be due in part to overlying   soft tissues versus small pleural reactions.  Trachea midline.  Generalized osteopenia.

## 2018-01-23 NOTE — PROGRESS NOTE ADULT - SUBJECTIVE AND OBJECTIVE BOX
[facility] Consult Note Nephrology - CONSULTATION NOTE    Patient is a 62y Male with ESRD on HD MWF, NICM with severe LV dysfnxn (EF 21%), s/p biotronic ICD, on home dobutamine drip, Afib on coumadin, COPD on 3-4L home O2, DM, and chronic hypotension on midodrine presenting with multiple complaints. Pt reports feeling more dyspneic than baseline over past week, increasing his home O2 to 5-6 L. Also reports repeated episodes of loose stool since yesterday, with bowel incontinence. Stool is brown, loose and not melanotic. +Nasuea and vomiting. Also with new onset back pain. +chills without documented fevers. +productive cough with greenish yellow sputum production. As per ED records, pt was more hypotensive than usual, with SBP 70s, from baseline 80s.   Pt had HD yesterday without acute events. He usually gets HD at [facility].      PAST MEDICAL & SURGICAL HISTORY:  Seizure: Off Keppra since 7/2017  Chronic hypotension  AF (atrial fibrillation)  COPD (chronic obstructive pulmonary disease): 4L home O2  HLD (hyperlipidemia)  DM (diabetes mellitus): Off Insulin since 7/2017  ESRD (end stage renal disease) on dialysis  BPH (benign prostatic hypertrophy)  Myocardial infarction: 10/2011  Gout  CHF (congestive heart failure)  AICD (automatic cardioverter/defibrillator) present: Biotronic - placed 9/11/09  H/O coronary angiogram    No Known Allergies    Home Medications Reviewed  Hospital Medications:   MEDICATIONS  (STANDING):  midodrine 20 milliGRAM(s) Oral Once    SOCIAL HISTORY:  Denies ETOh,Smoking,   FAMILY HISTORY:  No pertinent family history in first degree relatives    REVIEW OF SYSTEMS:  CONSTITUTIONAL: +weakness,   EYES/ENT: No visual changes;  No vertigo or throat pain   NECK: No pain or stiffness  RESPIRATORY: + cough, without wheezing, hemoptysis; +shortness of breath  CARDIOVASCULAR: No chest pain or palpitations.  GASTROINTESTINAL: No abdominal or epigastric pain. + nausea, vomiting, or hematemesis; +diarrhea. No melena or hematochezia.  NEUROLOGICAL: No numbness or weakness  SKIN: No itching, burning, rashes, or lesions   VASCULAR: No bilateral lower extremity edema.   All other review of systems is negative unless indicated above.    VITALS:  T(F): 97.8 (01-23-18 @ 10:30), Max: 97.8 (01-23-18 @ 10:30)  HR: 72 (01-23-18 @ 10:45)  BP: 70/55 (01-23-18 @ 10:45)  RR: 18 (01-23-18 @ 10:45)  SpO2: 99% (01-23-18 @ 10:45)  Wt(kg): --      PHYSICAL EXAM:  Constitutional: NAD  HEENT: anicteric sclera, oropharynx clear, MMM  Neck: No JVD  Respiratory: CTAB, no wheezes, rales or rhonchi  Cardiovascular: S1, S2, RRR  Gastrointestinal: BS+, soft, NT/ND  Extremities: No cyanosis or clubbing. +peripheral LE edema  Neurological: A/O x 3, no focal deficits  Psychiatric: Normal mood, normal affect  : No CVA tenderness. No rosa.   Skin: No rashes  Vascular Access: RIJ TUnneled catheter     LABS:  01-23    136  |  96<L>  |  23  ----------------------------<  101<H>  3.8   |  25  |  4.83<H>    Ca    8.7      23 Jan 2018 11:15    TPro  8.0  /  Alb  3.2<L>  /  TBili  0.5  /  DBili      /  AST  32  /  ALT  26  /  AlkPhos  183<H>  01-23    Creatinine Trend: 4.83 <--                        12.7   6.25  )-----------( 116      ( 23 Jan 2018 11:15 )             41.5     Urine Studies:      RADIOLOGY & ADDITIONAL STUDIES:

## 2018-01-23 NOTE — ED PROVIDER NOTE - MEDICAL DECISION MAKING DETAILS
Diarrhea with hx of ESRD/CHF on dobutamine; with hypotension due to diarrhea - Currently BP decreased to 70 after return to 90 SBP initially - will hydrate gently and admit for BP monitoring.  Midodrine 20mg now.

## 2018-01-23 NOTE — H&P ADULT - PROBLEM SELECTOR PLAN 4
- Patient on 4 liters of oxygen at home. No wheezes on exam. Bibasalar crackles.  - Patient with past prescription of budosenide -formoterol at home.   - Will add Duonebs q6 - patient with EF less than 10% as per Dr. Davis (cardiologist), further recs pending.   - Patient on fixed dose dobutamine gtt at home, patient rate currently 10.4 mL/hr of concentration 1000 mg/250 mL, which is 10 mcg/kg/min based on patient's current weight. Per Dr. Davis, should be 7.5 mcg/kg/min however patient states he has had significant weight loss recently, will continue current home dose for now and discuss with cardiology in AM  - Monitor on telemetry

## 2018-01-23 NOTE — H&P ADULT - ATTENDING COMMENTS
I saw and evaluated this patient at 6pm on 1/23/18   62M ESRD (HD MWF), NICM (EF 21%), ICD, PICC line in place with home dobutamine drip, afib on coumadin, COPD on home O2 (4-5L), hypotension on midodrine (SBP baseline 80s-90s) who presents for diarrhea and hypotension to 70s, human metapneumovirus  --Consult Dr Davis, pt's cardiologist for ongoing management of severe chronic systolic CHF  --supportive care for hPMV, bronchodilators  --gentle IVF   --nephrology consult for continuing HD  --holding warfarin for supertherapeutic INR

## 2018-01-23 NOTE — H&P ADULT - ASSESSMENT
62yM w/pmhx ESRD (HD MWF), NICM (EF 21%), ICD, PICC line in place with home dobutamine drip, afib on coumadin, COPD on home O2 (4-5L), hypotension on midodrine (follows with Dr. Davis) who presents for diarrhea and altered vision. 62yM w/pmhx ESRD (HD MWF), NICM (EF 21%), ICD, PICC line in place with home dobutamine drip, afib on coumadin, COPD on home O2 (4-5L), hypotension on midodrine (follows with Dr. Davis) who presents for diarrhea and brief loss of vision. 62yM w/pmhx ESRD (HD MWF), NICM (EF 21%), ICD, PICC line in place with home dobutamine drip, afib on coumadin, COPD on home O2 (4-5L), hypotension on midodrine (SBP baseline 80s-90s) (follows with Dr. Davis) who presents for diarrhea and hypotension to 70s, found to have human metapneumovirus.

## 2018-01-23 NOTE — ED ADULT NURSE NOTE - OBJECTIVE STATEMENT
patient alert ox3 came in c/o having diarrhea since this morning. denies any pain/n/v. breathing even and unlabored. not in any distress. PICC line noted in place. patient came in with PICC line with dobutamine continuos infusion from home, Shiley to right ACW noted. h/o ESRD. last dialysis was yesterday. connected to CM shows AFIB. labs done as ordered. awaiting results and re eval.

## 2018-01-23 NOTE — H&P ADULT - NSHPREVIEWOFSYSTEMS_GEN_ALL_CORE
Constitutional: No Fever, Fatigue, Weight Changes  Eyes: Recent vision problems.  No eye pain.  ENT: No congestion, ear pain, or sore throat.  Endocrine: No excess sweating, temperature intolerance  Cardiovascular: Shortness of breath.  No chest pain, palpitations, pre-syncope, syncope  Respiratory: Cough, No congestion, or wheezing.  Gastrointestinal: Diarrhea. No abdominal pain, nausea, vomiting.   Genitourinary: No dysuria, hematuria  Musculoskeletal: No joint swelling, joint pain  Neurologic: No headache, dizziness, focal weakness  Skin: No rashes, hematoma, prupura

## 2018-01-23 NOTE — H&P ADULT - PROBLEM SELECTOR PLAN 1
- Patient with 3 watery diarrhea bowel movement today. Has not had bowel movement in the ED. Denied nausea and vomiting. Has not changed diet. Likely due to bowel regiment and viral illness.  - Holding bowel regimen medications. CTM closely. - on NC 4L, goal of sat >90%   - duonebs q6  - Symptomatic treatment   - CTM closely resp status - on NC 4L, goal of sat >90%   - duonebs q6 hr  - Symptomatic treatment   - CTM closely resp status - on NC 4L, goal of sat >92%   -  Symptomatic treatment with duonebs q6 hr  -  Isolation Contact precautions   - CTM closely resp status

## 2018-01-23 NOTE — H&P ADULT - PROBLEM SELECTOR PLAN 5
- patient with paroxysmal afib on coumadin. INR 3 at this admission. Dr. Davis recs appreciated. Holding coumadin dose in the setting of diarrhea.  - At home takes amiodarone 100mg PO daily  - Monitor on tele floor - Patient on 4 liters of oxygen at home. No wheezes on exam. Bibasalar crackles.  - Patient with past prescription of budosenide -formoterol at home.   - Will add Duonebs q6 - Patient on 4 liters of oxygen at home. No wheezes on exam. Bibasilar crackles.  - Patient with past prescription of budosenide -formoterol at home, has not been on since October, will hold for now   - Duonebs q6 hr

## 2018-01-23 NOTE — PROGRESS NOTE ADULT - ASSESSMENT
62y Male with ESRD on HD MWF, NICM with severe LV dysfnxn (EF 21%), s/p biotronic ICD, on home dobutamine drip, Afib on coumadin, COPD on 3-4L home O2, DM, and chronic hypotension on midodrine presenting with SOB, diarrhea, N/V.

## 2018-01-23 NOTE — ED PROVIDER NOTE - PHYSICAL EXAMINATION
ATTENDING PHYSICAL EXAM DR. MARIN ***GEN - NAD; well appearing; A+O x3 ***HEAD - NC/AT ***EYES/NOSE - PERRL, EOMI, mucous membranes moist, no discharge ***THROAT: Oral cavity and pharynx normal. No inflammation, swelling, exudate, or lesions.  ***NECK: Neck supple, non-tender without lymphadenopathy, no masses, no thyromegaly.   ***PULMONARY - diffuse crackles, no accessory muscle use, symmetric breath sounds. ***CARDIAC -s1s2, RRR, no M,G,R  ***ABDOMEN - +BS, ND, NT, soft, no guarding, no rebound, no masses   ***BACK - no CVA tenderness, Normal  spine ***EXTREMITIES - symmetric pulses, 2+ dp, capillary refill < 2 seconds, no clubbing, no cyanosis, no edema, LUE PICC ***SKIN - no rash or bruising   ***NEUROLOGIC - alert

## 2018-01-23 NOTE — PROGRESS NOTE ADULT - PROBLEM SELECTOR PLAN 1
Maintenance HD schedule MWF.   Last dialyzed yesterday, electrolytes acceptable, plan for HD tomorrow.   UF goal usually limited by hypotension, cont midodrine pre HD.   Consent obtained.

## 2018-01-24 DIAGNOSIS — I50.23 ACUTE ON CHRONIC SYSTOLIC (CONGESTIVE) HEART FAILURE: ICD-10-CM

## 2018-01-24 DIAGNOSIS — I95.9 HYPOTENSION, UNSPECIFIED: ICD-10-CM

## 2018-01-24 DIAGNOSIS — N18.6 END STAGE RENAL DISEASE: ICD-10-CM

## 2018-01-24 LAB
APTT BLD: 65.1 SEC — HIGH (ref 27.5–37.4)
BASOPHILS # BLD AUTO: 0.01 K/UL — SIGNIFICANT CHANGE UP (ref 0–0.2)
BASOPHILS NFR BLD AUTO: 0.2 % — SIGNIFICANT CHANGE UP (ref 0–2)
BLD GP AB SCN SERPL QL: NEGATIVE — SIGNIFICANT CHANGE UP
BUN SERPL-MCNC: 34 MG/DL — HIGH (ref 7–23)
CALCIUM SERPL-MCNC: 8.8 MG/DL — SIGNIFICANT CHANGE UP (ref 8.4–10.5)
CHLORIDE SERPL-SCNC: 93 MMOL/L — LOW (ref 98–107)
CO2 SERPL-SCNC: 26 MMOL/L — SIGNIFICANT CHANGE UP (ref 22–31)
CREAT SERPL-MCNC: 6.36 MG/DL — HIGH (ref 0.5–1.3)
EOSINOPHIL # BLD AUTO: 0.1 K/UL — SIGNIFICANT CHANGE UP (ref 0–0.5)
EOSINOPHIL NFR BLD AUTO: 2 % — SIGNIFICANT CHANGE UP (ref 0–6)
GLUCOSE SERPL-MCNC: 117 MG/DL — HIGH (ref 70–99)
HBA1C BLD-MCNC: 6.5 % — HIGH (ref 4–5.6)
HBV SURFACE AG SER-ACNC: NEGATIVE — SIGNIFICANT CHANGE UP
HCT VFR BLD CALC: 39.4 % — SIGNIFICANT CHANGE UP (ref 39–50)
HGB BLD-MCNC: 12.6 G/DL — LOW (ref 13–17)
IMM GRANULOCYTES # BLD AUTO: 0.02 # — SIGNIFICANT CHANGE UP
IMM GRANULOCYTES NFR BLD AUTO: 0.4 % — SIGNIFICANT CHANGE UP (ref 0–1.5)
INR BLD: 4.04 — HIGH (ref 0.88–1.17)
LYMPHOCYTES # BLD AUTO: 0.9 K/UL — LOW (ref 1–3.3)
LYMPHOCYTES # BLD AUTO: 18.3 % — SIGNIFICANT CHANGE UP (ref 13–44)
MAGNESIUM SERPL-MCNC: 2 MG/DL — SIGNIFICANT CHANGE UP (ref 1.6–2.6)
MCHC RBC-ENTMCNC: 31.3 PG — SIGNIFICANT CHANGE UP (ref 27–34)
MCHC RBC-ENTMCNC: 32 % — SIGNIFICANT CHANGE UP (ref 32–36)
MCV RBC AUTO: 98 FL — SIGNIFICANT CHANGE UP (ref 80–100)
MONOCYTES # BLD AUTO: 0.53 K/UL — SIGNIFICANT CHANGE UP (ref 0–0.9)
MONOCYTES NFR BLD AUTO: 10.8 % — SIGNIFICANT CHANGE UP (ref 2–14)
NEUTROPHILS # BLD AUTO: 3.35 K/UL — SIGNIFICANT CHANGE UP (ref 1.8–7.4)
NEUTROPHILS NFR BLD AUTO: 68.3 % — SIGNIFICANT CHANGE UP (ref 43–77)
NRBC # FLD: 0 — SIGNIFICANT CHANGE UP
PHOSPHATE SERPL-MCNC: 3.9 MG/DL — SIGNIFICANT CHANGE UP (ref 2.5–4.5)
PLATELET # BLD AUTO: 126 K/UL — LOW (ref 150–400)
PMV BLD: 12.1 FL — SIGNIFICANT CHANGE UP (ref 7–13)
POTASSIUM SERPL-MCNC: 4 MMOL/L — SIGNIFICANT CHANGE UP (ref 3.5–5.3)
POTASSIUM SERPL-SCNC: 4 MMOL/L — SIGNIFICANT CHANGE UP (ref 3.5–5.3)
PROTHROM AB SERPL-ACNC: 47.8 SEC — HIGH (ref 9.8–13.1)
RBC # BLD: 4.02 M/UL — LOW (ref 4.2–5.8)
RBC # FLD: 16.5 % — HIGH (ref 10.3–14.5)
RH IG SCN BLD-IMP: POSITIVE — SIGNIFICANT CHANGE UP
SODIUM SERPL-SCNC: 133 MMOL/L — LOW (ref 135–145)
WBC # BLD: 4.91 K/UL — SIGNIFICANT CHANGE UP (ref 3.8–10.5)
WBC # FLD AUTO: 4.91 K/UL — SIGNIFICANT CHANGE UP (ref 3.8–10.5)

## 2018-01-24 PROCEDURE — 99233 SBSQ HOSP IP/OBS HIGH 50: CPT | Mod: GC

## 2018-01-24 RX ORDER — WARFARIN SODIUM 2.5 MG/1
2 TABLET ORAL ONCE
Qty: 0 | Refills: 0 | Status: DISCONTINUED | OUTPATIENT
Start: 2018-01-24 | End: 2018-01-24

## 2018-01-24 RX ORDER — MIDODRINE HYDROCHLORIDE 2.5 MG/1
10 TABLET ORAL ONCE
Qty: 0 | Refills: 0 | Status: COMPLETED | OUTPATIENT
Start: 2018-01-24 | End: 2018-01-24

## 2018-01-24 RX ADMIN — MIDODRINE HYDROCHLORIDE 10 MILLIGRAM(S): 2.5 TABLET ORAL at 17:55

## 2018-01-24 RX ADMIN — Medication 81 MILLIGRAM(S): at 11:28

## 2018-01-24 RX ADMIN — Medication 10.4 MICROGRAM(S)/KG/MIN: at 20:34

## 2018-01-24 RX ADMIN — MIDODRINE HYDROCHLORIDE 30 MILLIGRAM(S): 2.5 TABLET ORAL at 04:57

## 2018-01-24 RX ADMIN — MIDODRINE HYDROCHLORIDE 30 MILLIGRAM(S): 2.5 TABLET ORAL at 11:28

## 2018-01-24 RX ADMIN — Medication 10.4 MICROGRAM(S)/KG/MIN: at 21:58

## 2018-01-24 RX ADMIN — Medication 75 MICROGRAM(S): at 05:00

## 2018-01-24 RX ADMIN — MIDODRINE HYDROCHLORIDE 30 MILLIGRAM(S): 2.5 TABLET ORAL at 21:57

## 2018-01-24 RX ADMIN — FINASTERIDE 5 MILLIGRAM(S): 5 TABLET, FILM COATED ORAL at 11:28

## 2018-01-24 RX ADMIN — Medication 3 MILLILITER(S): at 04:34

## 2018-01-24 RX ADMIN — ATORVASTATIN CALCIUM 80 MILLIGRAM(S): 80 TABLET, FILM COATED ORAL at 21:58

## 2018-01-24 RX ADMIN — Medication 3 MILLILITER(S): at 21:20

## 2018-01-24 RX ADMIN — AMIODARONE HYDROCHLORIDE 200 MILLIGRAM(S): 400 TABLET ORAL at 05:00

## 2018-01-24 NOTE — DIETITIAN INITIAL EVALUATION ADULT. - OTHER INFO
Pt states that his appetite is fair and he has been eating fairly well. Pt receives dialysis 3x/week and sees a Renal Dietitian there. Pt states that knows and follows his diet at home. Pt had no complaints of GI distress nor of difficulty chewing or swallowing.69.2k

## 2018-01-24 NOTE — PROGRESS NOTE ADULT - SUBJECTIVE AND OBJECTIVE BOX
Patient is a 62y old  Male who presents with a chief complaint of Diarrhea (23 Jan 2018 17:05)    SUBJECTIVE / OVERNIGHT EVENTS:  No acute overnight. Patient denied fever, chills, sob, chest pain, cough, abdominal pain, focal weakness, lightheadedness, blurry vision. Orthostatic positive in the am. On telemetry persistnet Atrial flutter with occasional PVCs HR around the 80s.     MEDICATIONS  (STANDING):  ALBUTerol/ipratropium for Nebulization 3 milliLiter(s) Nebulizer every 6 hours  amiodarone    Tablet 200 milliGRAM(s) Oral daily  aspirin enteric coated 81 milliGRAM(s) Oral daily  atorvastatin 80 milliGRAM(s) Oral at bedtime  DOBUTamine Infusion 10.019 MICROgram(s)/kG/Min (10.4 mL/Hr) IV Continuous <Continuous>  finasteride 5 milliGRAM(s) Oral daily  levothyroxine 75 MICROGram(s) Oral daily  midodrine 30 milliGRAM(s) Oral every 8 hours    MEDICATIONS  (PRN):    Vital Signs Last 24 Hrs  T(C): 36.7 (24 Jan 2018 08:15), Max: 36.7 (23 Jan 2018 16:56)  T(F): 98 (24 Jan 2018 08:15), Max: 98.1 (23 Jan 2018 16:56)  HR: 81 (24 Jan 2018 08:15) (69 - 93)  BP: 81/55 (24 Jan 2018 08:15) (70/55 - 89/47)  RR: 18 (24 Jan 2018 08:15) (16 - 18)  SpO2: 96% (24 Jan 2018 08:15) (94% - 99%)  CAPILLARY BLOOD GLUCOSE    I&O's Summary    23 Jan 2018 07:01  -  24 Jan 2018 07:00  --------------------------------------------------------  IN: 453.6 mL / OUT: 0 mL / NET: 453.6 mL      PHYSICAL EXAM:  GENERAL: NAD, well-developed  HEAD:  Atraumatic, Normocephalic  EYES: EOMI, PERRLA, conjunctiva and sclera clear  NECK: Supple, No JVD  CHEST/LUNG: Clear to auscultation bilaterally; No wheeze, soft bibasalar crackles   HEART: Regular rate and rhythm; Normal S1 S2, No murmurs, rubs, or gallops  ABDOMEN: Soft, Nontender, Nondistended; Bowel sounds present  EXTREMITIES:  2+ Peripheral Pulses, No clubbing, cyanosis, or edema  PSYCH: AAOx3  NEUROLOGY: non-focal  SKIN: No rashes or lesions    LABS:               12.7   6.25  )-----------( 116      ( 23 Jan 2018 11:15 )             41.5     01-23  136  |  96<L>  |  23  ----------------------------<  101<H>  3.8   |  25  |  4.83<H>    Ca    8.7      23 Jan 2018 11:15    TPro  8.0  /  Alb  3.2<L>  /  TBili  0.5  /  DBili  x   /  AST  32  /  ALT  26  /  AlkPhos  183<H>  01-23    PT/INR - ( 23 Jan 2018 11:15 )   PT: 35.3 SEC;   INR: 3.00      PTT - ( 23 Jan 2018 11:15 )  PTT:46.6 SEC    New labs pending.       RADIOLOGY & ADDITIONAL TESTS: No new labs.     Imaging Personally Reviewed:    Consultant(s) Notes Reviewed:      Care Discussed with Consultants/Other Providers: Patient is a 62y old  Male who presents with a chief complaint of Diarrhea (23 Jan 2018 17:05)    SUBJECTIVE / OVERNIGHT EVENTS:  No acute overnight. Patient denied fever, chills, sob, chest pain, cough, abdominal pain, focal weakness, lightheadedness, blurry vision. Orthostatic positive in the am. On telemetry persistnet Atrial flutter with occasional PVCs HR around the 80s.     MEDICATIONS  (STANDING):  ALBUTerol/ipratropium for Nebulization 3 milliLiter(s) Nebulizer every 6 hours  amiodarone    Tablet 200 milliGRAM(s) Oral daily  aspirin enteric coated 81 milliGRAM(s) Oral daily  atorvastatin 80 milliGRAM(s) Oral at bedtime  DOBUTamine Infusion 10.019 MICROgram(s)/kG/Min (10.4 mL/Hr) IV Continuous <Continuous>  finasteride 5 milliGRAM(s) Oral daily  levothyroxine 75 MICROGram(s) Oral daily  midodrine 30 milliGRAM(s) Oral every 8 hours    MEDICATIONS  (PRN):    Vital Signs Last 24 Hrs  T(C): 36.7 (24 Jan 2018 08:15), Max: 36.7 (23 Jan 2018 16:56)  T(F): 98 (24 Jan 2018 08:15), Max: 98.1 (23 Jan 2018 16:56)  HR: 81 (24 Jan 2018 08:15) (69 - 93)  BP: 81/55 (24 Jan 2018 08:15) (70/55 - 89/47)  RR: 18 (24 Jan 2018 08:15) (16 - 18)  SpO2: 96% (24 Jan 2018 08:15) (94% - 99%)  CAPILLARY BLOOD GLUCOSE    I&O's Summary    23 Jan 2018 07:01  -  24 Jan 2018 07:00  --------------------------------------------------------  IN: 453.6 mL / OUT: 0 mL / NET: 453.6 mL      PHYSICAL EXAM:  GENERAL: NAD, well-developed  HEAD:  Atraumatic, Normocephalic  EYES: EOMI, PERRLA, conjunctiva and sclera clear  NECK: Supple, No JVD  CHEST/LUNG: Clear to auscultation bilaterally; No wheeze, soft bibasalar crackles   HEART: Regular rate and rhythm; Normal S1 S2, No murmurs, rubs, or gallops  ABDOMEN: Soft, Nontender, Nondistended; Bowel sounds present  EXTREMITIES:  2+ Peripheral Pulses, No clubbing, cyanosis, or edema  PSYCH: AAOx3  NEUROLOGY: non-focal  SKIN: No rashes or lesions    LABS:               12.7   6.25  )-----------( 116      ( 23 Jan 2018 11:15 )             41.5     01-23  136  |  96<L>  |  23  ----------------------------<  101<H>  3.8   |  25  |  4.83<H>    Ca    8.7      23 Jan 2018 11:15    TPro  8.0  /  Alb  3.2<L>  /  TBili  0.5  /  DBili  x   /  AST  32  /  ALT  26  /  AlkPhos  183<H>  01-23    PT/INR - ( 23 Jan 2018 11:15 )   PT: 35.3 SEC;   INR: 3.00      PTT - ( 23 Jan 2018 11:15 )  PTT:46.6 SEC    .  Labs reviewed personally.                          12.6   4.91  )-----------( 126      ( 24 Jan 2018 10:45 )             39.4     Hgb Trend: 12.6<--, 12.7<--  01-24    133<L>  |  93<L>  |  34<H>  ----------------------------<  117<H>  4.0   |  26  |  6.36<H>    Ca    8.8      24 Jan 2018 10:45  Phos  3.9     01-24  Mg     2.0     01-24    TPro  8.0  /  Alb  3.2<L>  /  TBili  0.5  /  DBili  x   /  AST  32  /  ALT  26  /  AlkPhos  183<H>  01-23    Creatinine Trend: 6.36<--, 4.83<--  PT/INR - ( 24 Jan 2018 10:45 )   PT: 47.8 SEC;   INR: 4.04    PTT - ( 24 Jan 2018 10:45 )  PTT:65.1 SEC    Imaging reviewed personally.    RADIOLOGY & ADDITIONAL TESTS: No new imaging.     Imaging Personally Reviewed:    Consultant(s) Notes Reviewed:      Care Discussed with Consultants/Other Providers:

## 2018-01-24 NOTE — PROGRESS NOTE ADULT - SUBJECTIVE AND OBJECTIVE BOX
[facility] - Mark / Khai PADRON /Jennifer/ VITO García/ VITO Leigh/ Kolton Farr / LISSA Njeru  ---------------------------------------------------------------------------------------------------------------  Patient seen and examined bedside for ESRD, seen on HD    Subjective and Objective: No new complaints today. diarrhea improved, 1 BM today so far. +SOB persists. denies dizzyness    Allergies: No Known Allergies      Hospital Medications:   MEDICATIONS  (STANDING):  ALBUTerol/ipratropium for Nebulization 3 milliLiter(s) Nebulizer every 6 hours  amiodarone    Tablet 200 milliGRAM(s) Oral daily  aspirin enteric coated 81 milliGRAM(s) Oral daily  atorvastatin 80 milliGRAM(s) Oral at bedtime  DOBUTamine Infusion 10.019 MICROgram(s)/kG/Min (10.4 mL/Hr) IV Continuous <Continuous>  finasteride 5 milliGRAM(s) Oral daily  levothyroxine 75 MICROGram(s) Oral daily  midodrine 30 milliGRAM(s) Oral every 8 hours      VITALS:  T(F): 97.8 (01-24-18 @ 16:40), Max: 98 (01-24-18 @ 08:15)  HR: 72 (01-24-18 @ 16:40)  BP: 100/54 (01-24-18 @ 16:40)  RR: 19 (01-24-18 @ 16:40)  SpO2: 97% (01-24-18 @ 16:40)  Wt(kg): --    01-23 @ 07:01  -  01-24 @ 07:00  --------------------------------------------------------  IN: 453.6 mL / OUT: 0 mL / NET: 453.6 mL    01-24 @ 07:01  -  01-24 @ 17:21  --------------------------------------------------------  IN: 440 mL / OUT: 0 mL / NET: 440 mL      Height (cm): 180.34 (01-23 @ 19:33)  Weight (kg): 69.2 (01-23 @ 19:33)  BMI (kg/m2): 21.3 (01-23 @ 19:33)  BSA (m2): 1.88 (01-23 @ 19:33)    PHYSICAL EXAM:  Constitutional: NAD  HEENT: anicteric sclera, oropharynx clear  Neck: No JVD  Respiratory: CTAB, no wheezes, rales or rhonchi  Cardiovascular: S1, S2, RRR  Gastrointestinal: BS+, soft, NT/ND  Extremities: No cyanosis or clubbing. +peripheral edema  Neurological: A/O x 3, no focal deficits  Psychiatric: Normal mood, normal affect  : No CVA tenderness. No rosa.   Skin: No rashes  Vascular Access: right IJ PC    LABS:  01-24    133<L>  |  93<L>  |  34<H>  ----------------------------<  117<H>  4.0   |  26  |  6.36<H>    Ca    8.8      24 Jan 2018 10:45  Phos  3.9     01-24  Mg     2.0     01-24    TPro  8.0  /  Alb  3.2<L>  /  TBili  0.5  /  DBili      /  AST  32  /  ALT  26  /  AlkPhos  183<H>  01-23    Creatinine Trend: 6.36 <--, 4.83 <--                        12.6   4.91  )-----------( 126      ( 24 Jan 2018 10:45 )             39.4     Urine Studies:        RADIOLOGY & ADDITIONAL STUDIES:

## 2018-01-24 NOTE — DIETITIAN INITIAL EVALUATION ADULT. - PROBLEM SELECTOR PLAN 2
- Patient with 3 watery diarrhea bowel movement today. Has not had bowel movement in the ED. Denied nausea and vomiting. Has not changed diet. Likely due to bowel regiment and viral illness.  - Holding bowel regimen medications. CTM closely.

## 2018-01-24 NOTE — DIETITIAN INITIAL EVALUATION ADULT. - PROBLEM SELECTOR PLAN 3
- Gets dialysis in [facility]   - Nephrology recs appreciated. Continue with dialysis schedule.   - Renal diet ordered   - Continue to monitor on BMP  - Strict I/Os

## 2018-01-24 NOTE — PROGRESS NOTE ADULT - PROBLEM SELECTOR PLAN 2
- Patient with 3 watery diarrhea bowel movement today. Has not had bowel movement in the ED. Denied nausea and vomiting. Has not changed diet. Likely due to bowel regiment and viral illness.  - Holding bowel regimen medications. CTM closely. - Resolved.  - Holding bowel regimen medications.

## 2018-01-24 NOTE — PROGRESS NOTE ADULT - ATTENDING COMMENTS
I saw and evaluated this patient at 6pm on 1/23/18   62M ESRD (HD MWF), NICM (EF 21%), ICD, PICC line in place with home dobutamine drip, afib on coumadin, COPD on home O2 (4-5L), hypotension on midodrine (SBP baseline 80s-90s) who presents for diarrhea and hypotension to 70s, human metapneumovirus  --severe chronic systolic CHF, appreciate Dr Davis's recs  --supportive care for hPMV, bronchodilators.  No further diarrhea, pt feels well, no symptoms with position change  --nephrology consult for continuing HD  --holding warfarin for supertherapeutic INR  --Clinically improved, may be stable for D/c if tolerates HD today and remains stable afterwards  d/c time 45 min 62M ESRD (HD MWF), NICM (EF 21%), ICD, PICC line in place with home dobutamine drip, afib on coumadin, COPD on home O2 (4-5L), hypotension on midodrine (SBP baseline 80s-90s) who presents for diarrhea and hypotension to 70s, human metapneumovirus  --severe chronic systolic CHF, appreciate Dr Davis's recs  --supportive care for hPMV, bronchodilators.  No further diarrhea, pt feels well, no symptoms with position change  --nephrology consult for continuing HD  --holding warfarin for supertherapeutic INR  --Clinically improved, may be stable for D/c if tolerates HD today and remains stable afterwards  d/c time 45 min

## 2018-01-24 NOTE — PROGRESS NOTE ADULT - PROBLEM SELECTOR PLAN 5
- Patient on 4 liters of oxygen at home. No wheezes on exam. Bibasilar crackles.  - Patient with past prescription of budosenide -formoterol at home, has not been on since October, will hold for now   - Duonebs q6 hr

## 2018-01-24 NOTE — PROGRESS NOTE ADULT - PROBLEM SELECTOR PLAN 4
- patient with EF less than 10% as per Dr. Davis (cardiologist), further recs appreciated.   - Patient on fixed dose dobutamine gtt at home, patient rate currently 10.4 mL/hr of concentration 1000 mg/250 mL, which is 10 mcg/kg/min based on patient's current weight. Per Dr. Davis, should be 7.5 mcg/kg/min however patient states he has had significant weight loss recently, will continue current home dose for now and discuss with cardiology in AM

## 2018-01-24 NOTE — PROGRESS NOTE ADULT - ASSESSMENT
62y Male with ESRD on HD MWF, NICM with severe LV dysfnxn (EF 21%), s/p biotronic ICD, on home dobutamine drip, Afib on coumadin, COPD on 3-4L home O2, DM, and chronic hypotension on midodrine presented with SOB, diarrhea, N/V. Renal following for ESRD, for HD      ESRD on dialysis, Maintenance HD schedule MWF. electrolytes acceptable  HYpotension, chronic  CHF

## 2018-01-24 NOTE — DIETITIAN INITIAL EVALUATION ADULT. - PROBLEM SELECTOR PLAN 6
- patient with paroxysmal afib on coumadin. INR 3 at this admission. Dr. Davis recs appreciated. Holding coumadin dose tonight in the setting of diarrhea. Check INR daily and dose coumadin as appropriate  - At home takes amiodarone 200mg PO daily  - Monitor on tele floor

## 2018-01-24 NOTE — CONSULT NOTE ADULT - SUBJECTIVE AND OBJECTIVE BOX
62 year old male with a severe non-ischemic cardiomyopathy EF 10% S/P ICD on a continuous Dobutamine infusion with ESRD on HD and chronic interstitial lung disease on chronic O2 presents following acute N/V and diarrhea.  His initial BP was in the 70's.  He was given a small amount of fluid, Midodrine and his BP has resumed to his normal 85-95 systolic.  He now feels better with resolution of the nausea and diarrhea.  He feels hungry but complains of dyspnea which is a chronic issue.    Meds: Midodrine 30 mg tid            Amiodarone 200 mg qd            Synthroid 75 mcg qd            Dobutamine @ 7 mcg/kg/min            Finasteride 5 mg qd            ASA 81 mg qd            Atorvastatin 80 mg qd            Renvela            Coumadin    BP 81/48  HR 83  No JVD  Bilateral crackles (chronic)  Irregular rhythm 1-2/6 systolic murmur  No edema    EKG: Atrial fib    WBC 6.25  Hgb 12.7  Hct 41.5  Plt 116K  INR 3.0  BUN 23  Crt 4.83  CO2  25    Imp: Patient looks clinically improved.  He is hungry and the diarrhea has resolved  Rec: Resume usual Coumadin of 2 mg qd   Maybe discharged after HD   Increase PO fluids  Continue previous medications

## 2018-01-24 NOTE — PROGRESS NOTE ADULT - PROBLEM SELECTOR PLAN 6
- patient with paroxysmal afib on coumadin. INR 3 at this admission. Dr. Davis recs appreciated. Holding coumadin dose tonight in the setting of diarrhea. Check INR daily and dose coumadin as appropriate  - At home takes amiodarone 200mg PO daily  - Monitor on telemetry, atrial flutter HR 80s - patient with paroxysmal afib on coumadin. INR 3 at this admission. Dr. Davis recs appreciated. Holding coumadin dose tonight in the setting of diarrhea. \  - INR supratherapeutic, holding coumadin.   - c/w amiodarone 200mg PO daily  - Monitor on telemetry, atrial flutter HR 80s

## 2018-01-24 NOTE — PROGRESS NOTE ADULT - PROBLEM SELECTOR PLAN 1
c/w HD w/2k bath, uf 1.6-2kg as tolerated, low bath temp  got midodrine at 3PM, give extra dose of 10mg, 1 hr onto HD  renal diet, fluid restriction reinforced w/pt

## 2018-01-24 NOTE — DIETITIAN INITIAL EVALUATION ADULT. - PROBLEM SELECTOR PLAN 4
- patient with EF less than 10% as per Dr. Davis (cardiologist), further recs pending.   - Patient on fixed dose dobutamine gtt at home, patient rate currently 10.4 mL/hr of concentration 1000 mg/250 mL, which is 10 mcg/kg/min based on patient's current weight. Per Dr. Davis, should be 7.5 mcg/kg/min however patient states he has had significant weight loss recently, will continue current home dose for now and discuss with cardiology in AM  - Monitor on telemetry

## 2018-01-24 NOTE — DIETITIAN INITIAL EVALUATION ADULT. - PROBLEM SELECTOR PLAN 10
- DVT ppx: Therapeutically anticoagulated with coumadin for afib  - Diet: Renal diet with consistent carbs.

## 2018-01-24 NOTE — PROGRESS NOTE ADULT - PROBLEM SELECTOR PLAN 1
- on NC 4L, goal of sat >92%   -  Symptomatic treatment with duonebs q6 hr  -  Isolation Contact precautions   - CTM closely resp status

## 2018-01-25 ENCOUNTER — TRANSCRIPTION ENCOUNTER (OUTPATIENT)
Age: 63
End: 2018-01-25

## 2018-01-25 LAB
APTT BLD: 55.2 SEC — HIGH (ref 27.5–37.4)
BASOPHILS # BLD AUTO: 0.02 K/UL — SIGNIFICANT CHANGE UP (ref 0–0.2)
BASOPHILS NFR BLD AUTO: 0.4 % — SIGNIFICANT CHANGE UP (ref 0–2)
BUN SERPL-MCNC: 20 MG/DL — SIGNIFICANT CHANGE UP (ref 7–23)
CALCIUM SERPL-MCNC: 8.4 MG/DL — SIGNIFICANT CHANGE UP (ref 8.4–10.5)
CHLORIDE SERPL-SCNC: 96 MMOL/L — LOW (ref 98–107)
CO2 SERPL-SCNC: 29 MMOL/L — SIGNIFICANT CHANGE UP (ref 22–31)
CREAT SERPL-MCNC: 4.62 MG/DL — HIGH (ref 0.5–1.3)
EOSINOPHIL # BLD AUTO: 0.11 K/UL — SIGNIFICANT CHANGE UP (ref 0–0.5)
EOSINOPHIL NFR BLD AUTO: 2.1 % — SIGNIFICANT CHANGE UP (ref 0–6)
GLUCOSE SERPL-MCNC: 137 MG/DL — HIGH (ref 70–99)
HCT VFR BLD CALC: 40.3 % — SIGNIFICANT CHANGE UP (ref 39–50)
HGB BLD-MCNC: 12.8 G/DL — LOW (ref 13–17)
IMM GRANULOCYTES # BLD AUTO: 0.02 # — SIGNIFICANT CHANGE UP
IMM GRANULOCYTES NFR BLD AUTO: 0.4 % — SIGNIFICANT CHANGE UP (ref 0–1.5)
INR BLD: 3.35 — HIGH (ref 0.88–1.17)
LYMPHOCYTES # BLD AUTO: 0.8 K/UL — LOW (ref 1–3.3)
LYMPHOCYTES # BLD AUTO: 15.6 % — SIGNIFICANT CHANGE UP (ref 13–44)
MAGNESIUM SERPL-MCNC: 2.3 MG/DL — SIGNIFICANT CHANGE UP (ref 1.6–2.6)
MCHC RBC-ENTMCNC: 31.4 PG — SIGNIFICANT CHANGE UP (ref 27–34)
MCHC RBC-ENTMCNC: 31.8 % — LOW (ref 32–36)
MCV RBC AUTO: 99 FL — SIGNIFICANT CHANGE UP (ref 80–100)
MONOCYTES # BLD AUTO: 0.49 K/UL — SIGNIFICANT CHANGE UP (ref 0–0.9)
MONOCYTES NFR BLD AUTO: 9.6 % — SIGNIFICANT CHANGE UP (ref 2–14)
NEUTROPHILS # BLD AUTO: 3.69 K/UL — SIGNIFICANT CHANGE UP (ref 1.8–7.4)
NEUTROPHILS NFR BLD AUTO: 71.9 % — SIGNIFICANT CHANGE UP (ref 43–77)
NRBC # FLD: 0 — SIGNIFICANT CHANGE UP
PHOSPHATE SERPL-MCNC: 2.9 MG/DL — SIGNIFICANT CHANGE UP (ref 2.5–4.5)
PLATELET # BLD AUTO: 122 K/UL — LOW (ref 150–400)
PMV BLD: 12.2 FL — SIGNIFICANT CHANGE UP (ref 7–13)
POTASSIUM SERPL-MCNC: 3.8 MMOL/L — SIGNIFICANT CHANGE UP (ref 3.5–5.3)
POTASSIUM SERPL-SCNC: 3.8 MMOL/L — SIGNIFICANT CHANGE UP (ref 3.5–5.3)
PROTHROM AB SERPL-ACNC: 39.5 SEC — HIGH (ref 9.8–13.1)
RBC # BLD: 4.07 M/UL — LOW (ref 4.2–5.8)
RBC # FLD: 16.7 % — HIGH (ref 10.3–14.5)
SODIUM SERPL-SCNC: 138 MMOL/L — SIGNIFICANT CHANGE UP (ref 135–145)
WBC # BLD: 5.13 K/UL — SIGNIFICANT CHANGE UP (ref 3.8–10.5)
WBC # FLD AUTO: 5.13 K/UL — SIGNIFICANT CHANGE UP (ref 3.8–10.5)

## 2018-01-25 PROCEDURE — 99233 SBSQ HOSP IP/OBS HIGH 50: CPT | Mod: GC

## 2018-01-25 RX ORDER — BENZOCAINE AND MENTHOL 5; 1 G/100ML; G/100ML
1 LIQUID ORAL EVERY 8 HOURS
Qty: 0 | Refills: 0 | Status: DISCONTINUED | OUTPATIENT
Start: 2018-01-25 | End: 2018-01-26

## 2018-01-25 RX ADMIN — MIDODRINE HYDROCHLORIDE 30 MILLIGRAM(S): 2.5 TABLET ORAL at 05:26

## 2018-01-25 RX ADMIN — Medication 3 MILLILITER(S): at 03:43

## 2018-01-25 RX ADMIN — Medication 3 MILLILITER(S): at 22:00

## 2018-01-25 RX ADMIN — MIDODRINE HYDROCHLORIDE 30 MILLIGRAM(S): 2.5 TABLET ORAL at 22:22

## 2018-01-25 RX ADMIN — Medication 10.4 MICROGRAM(S)/KG/MIN: at 22:22

## 2018-01-25 RX ADMIN — FINASTERIDE 5 MILLIGRAM(S): 5 TABLET, FILM COATED ORAL at 12:27

## 2018-01-25 RX ADMIN — Medication 3 MILLILITER(S): at 09:29

## 2018-01-25 RX ADMIN — Medication 81 MILLIGRAM(S): at 12:27

## 2018-01-25 RX ADMIN — Medication 3 MILLILITER(S): at 16:12

## 2018-01-25 RX ADMIN — AMIODARONE HYDROCHLORIDE 200 MILLIGRAM(S): 400 TABLET ORAL at 05:27

## 2018-01-25 RX ADMIN — Medication 10.4 MICROGRAM(S)/KG/MIN: at 05:26

## 2018-01-25 RX ADMIN — ATORVASTATIN CALCIUM 80 MILLIGRAM(S): 80 TABLET, FILM COATED ORAL at 22:22

## 2018-01-25 RX ADMIN — MIDODRINE HYDROCHLORIDE 30 MILLIGRAM(S): 2.5 TABLET ORAL at 12:27

## 2018-01-25 RX ADMIN — Medication 75 MICROGRAM(S): at 05:27

## 2018-01-25 NOTE — PROGRESS NOTE ADULT - PROBLEM SELECTOR PLAN 1
Pt tolerated HD yesterday, but with significant hypotension intradialytic. Cont midodrine with HD.  Plan for repeat HD tomorrow.   Electrolytes acceptable.

## 2018-01-25 NOTE — DISCHARGE NOTE ADULT - HOME CARE AGENCY
[facility] [phone] soc 1/27. [facility] [place]    [phone] RN will contact [facility] for soc, PT, Eugenio for HHA [facility] [phone] soc 1/31. [facility]     RN will contact you for soc, PT, Eval for HHA

## 2018-01-25 NOTE — PROGRESS NOTE ADULT - PROBLEM SELECTOR PLAN 1
- on NC 4L, goal of sat >92%   -  Symptomatic treatment with duonebs q6 hr  -  Isolation Contact precautions   - CTM closely resp status  - blood streaks likely due to mucosal irritation from HMPV in setting of supratherapeutic INR, lung exam unchanged, will continue to monitor

## 2018-01-25 NOTE — DISCHARGE NOTE ADULT - HOSPITAL COURSE
HPI: 62yM w/pmhx ESRD (HD MWF), NICM (EF 21%), ICD, PICC line in place with home dobutamine drip, afib on coumadin, COPD on home O2 (4-5L), hypotension on midodrine (follows with Dr. Davis) who presents for diarrhea and brief loss of vision. Patient states he got dialysis on jan 22 with no complications, after dialysis he had mild lower back pain and sob which improved by itself. On Jan 23 in the morning, patient felt the need to poop, but did not. About 20 minutes later, he felt the need to go, stood up from his bed, and before reaching the bathroom he soiled himself, watery diarrhea non bloody. At that time his vision blacked out, with no dizziness, lightheadedness, no LOC. Regained vision with rest. Ambulance came, while coming downstairs he had two more watery diarrhea bowel movements. Patient endorsed he has a cough for the past 2 days, but no fever. Patient denied nausea, palpitations, chest pain, abdominal pain, focal weakness. He ambulates with a cane.     Patient received 100 cc IVF by EMS due to SBP in 70s on their arrival. In the ED, T97.8, BP 88/50, HR 80, RR 18 saturating 96% on NC 4L. RVP+ HMPV. Midodrine 20mg x1.      Hospital Course: Patient was given supportive care with duonebs for HMPV. He was continued on his home supplemental oxygen. He was continued on his home dose of dobutamine infusion (4 milligrams/milliliter at rate of 10.4 milliliters/hour, which is equal to 10 micrograms/kilogram/minute based on patient's current weight of 69.2 kilograms). He was found to have a supratherapeutic INR so coumadin was held until INR was in therapeutic range. He had no diarrhea during admission and denied any dizziness, lightheadedness or vision changes when standing. He tolerated hemodialysis while hospitalized without issue. He was found to have a hemoglobin A1c of 6.5% and will follow up with his primary care doctor for further management. HPI: 62yM w/pmhx ESRD (HD MWF), NICM (EF 21%), ICD, PICC line in place with home dobutamine drip, afib on coumadin, COPD on home O2 (4-5L), hypotension on midodrine (follows with Dr. Davis) who presents for diarrhea and brief loss of vision. Patient states he got dialysis on jan 22 with no complications, after dialysis he had mild lower back pain and sob which improved by itself. On Jan 23 in the morning, patient felt the need to poop, but did not. About 20 minutes later, he felt the need to go, stood up from his bed, and before reaching the bathroom he soiled himself, watery diarrhea non bloody. At that time his vision blacked out, with no dizziness, lightheadedness, no LOC. Regained vision with rest. Ambulance came, while coming downstairs he had two more watery diarrhea bowel movements. Patient endorsed he has a cough for the past 2 days, but no fever. Patient denied nausea, palpitations, chest pain, abdominal pain, focal weakness. He ambulates with a cane.     Patient received 100 cc IVF by EMS due to SBP in 70s on their arrival. In the ED, T97.8, BP 88/50, HR 80, RR 18 saturating 96% on NC 4L. RVP+ HMPV. Midodrine 20mg x1.      Hospital Course: Patient was given supportive care with duonebs for HMPV. He was continued on his home supplemental oxygen. He was continued on his home dose of dobutamine infusion (4 milligrams/milliliter at rate of 10.4 milliliters/hour, which is equal to 10 micrograms/kilogram/minute based on patient's current weight of 69.2 kilograms). He was found to have a supratherapeutic INR so coumadin was held until INR was in therapeutic range. He had no diarrhea during admission and denied any dizziness, lightheadedness or vision changes when standing. He tolerated hemodialysis while hospitalized without issue. Endorsed constipation, was restarted on senna and colace.  He was found to have a hemoglobin A1c of 6.5% and will follow up with his primary care doctor for further management. Patient will follow up with Dr. Davis next week to check INR. HPI: 62yM w/pmhx ESRD (HD MWF), NICM (EF 21%), ICD, PICC line in place with home dobutamine drip, afib on coumadin, COPD on home O2 (4-5L), hypotension on midodrine (follows with Dr. Davis) who presents for diarrhea and brief loss of vision. Patient states he got dialysis on jan 22 with no complications, after dialysis he had mild lower back pain and sob which improved by itself. On Jan 23 in the morning, patient felt the need to poop, but did not. About 20 minutes later, he felt the need to go, stood up from his bed, and before reaching the bathroom he soiled himself, watery diarrhea non bloody. At that time his vision blacked out, with no dizziness, lightheadedness, no LOC. Regained vision with rest. Ambulance came, while coming downstairs he had two more watery diarrhea bowel movements. Patient endorsed he has a cough for the past 2 days, but no fever. Patient denied nausea, palpitations, chest pain, abdominal pain, focal weakness. He ambulates with a cane.     Patient received 100 cc IVF by EMS due to SBP in 70s on their arrival. In the ED, T97.8, BP 88/50, HR 80, RR 18 saturating 96% on NC 4L. RVP+ HMPV. Midodrine 20mg x1.      Hospital Course: Patient was given supportive care with duonebs for HMPV. He was continued on his home supplemental oxygen. He was continued on his home dose of dobutamine infusion (4 milligrams/milliliter at rate of 10.4 milliliters/hour, which is equal to 10 micrograms/kilogram/minute based on patient's current weight of 69.2 kilograms). He was found to have a supratherapeutic INR so coumadin was held until INR was in therapeutic range. He had no diarrhea during admission and denied any dizziness, lightheadedness or vision changes when standing. He tolerated hemodialysis while hospitalized without issue. Endorsed constipation, was restarted on senna and colace.  He was found to have a hemoglobin A1c of 6.5% and will follow up with his primary care doctor for further management. Patient will continue on coumadin 2mg daily, and will follow up with Dr. Davis next week to check INR. HPI: 62yM w/pmhx ESRD (HD MWF), NICM (EF 21%), ICD, PICC line in place with home dobutamine drip, afib on coumadin, COPD on home O2 (4-5L), hypotension on midodrine (follows with Dr. Davis) who presents for diarrhea and brief loss of vision. Patient states he got dialysis on jan 22 with no complications, after dialysis he had mild lower back pain and sob which improved by itself. On Jan 23 in the morning, patient felt the need to poop, but did not. About 20 minutes later, he felt the need to go, stood up from his bed, and before reaching the bathroom he soiled himself, watery diarrhea non bloody. At that time his vision blacked out, with no dizziness, lightheadedness, no LOC. Regained vision with rest. Ambulance came, while coming downstairs he had two more watery diarrhea bowel movements. Patient endorsed he has a cough for the past 2 days, but no fever. Patient denied nausea, palpitations, chest pain, abdominal pain, focal weakness. He ambulates with a cane.     Patient received 100 cc IVF by EMS due to SBP in 70s on their arrival. In the ED, T97.8, BP 88/50, HR 80, RR 18 saturating 96% on NC 4L. RVP+ HMPV. Midodrine 20mg x1.      Hospital Course: Patient was given supportive care with duonebs for HMPV. He was continued on his home supplemental oxygen. He was continued on his home dose of dobutamine infusion (4 milligrams/milliliter at rate of 10.4 milliliters/hour, which is equal to 10 micrograms/kilogram/minute based on patient's current weight of 69.2 kilograms). He was found to have a supratherapeutic INR so coumadin was held until INR was in therapeutic range. He had no diarrhea during admission and denied any dizziness, lightheadedness or vision changes when standing. He tolerated hemodialysis while hospitalized without issue. Endorsed constipation, was restarted on senna and colace.  He was found to have a hemoglobin A1c of 6.5% and will follow up with his primary care doctor for further management. Patient will continue on coumadin 3mg daily, and will follow up with Dr. Davis next week to check INR.

## 2018-01-25 NOTE — PROGRESS NOTE ADULT - ATTENDING COMMENTS
I saw and evaluated this patient at 6pm on 1/23/18   62M ESRD (HD MWF), NICM (EF 21%), ICD, PICC line in place with home dobutamine drip, afib on coumadin, COPD on home O2 (4-5L), hypotension on midodrine (SBP baseline 80s-90s) who presents for diarrhea and hypotension to 70s, human metapneumovirus  --continue supportive care for hPMV, bronchodilators.  No further diarrhea, but pt has worsened resp symptoms today, some hemoptysis  --severe chronic systolic CHF, appreciate cardiology recs--stable on IV inotrope  --nephrology following for continuing HD  --resume warfarin when INR<3  --Continue to monitor in hospital for worsening of symptoms 62M ESRD (HD MWF), NICM (EF 21%), ICD, PICC line in place with home dobutamine drip, afib on coumadin, COPD on home O2 (4-5L), hypotension on midodrine (SBP baseline 80s-90s) who presents for diarrhea and hypotension to 70s, human metapneumovirus  --continue supportive care for hPMV, bronchodilators.  No further diarrhea, but pt has worsened resp symptoms today, some hemoptysis  --severe chronic systolic CHF, appreciate cardiology recs--stable on IV inotrope  --nephrology following for continuing HD  --resume warfarin when INR<3  --Continue to monitor in hospital for worsening of symptoms

## 2018-01-25 NOTE — PROGRESS NOTE ADULT - SUBJECTIVE AND OBJECTIVE BOX
Patient is a 62y old  Male who presents with a chief complaint of Diarrhea (23 Jan 2018 17:05)      SUBJECTIVE / OVERNIGHT EVENTS: States he had increased coughing with streaks of blood this morning. Denies any dizziness, lightheadedness or vision changes when standing. No diarrhea since admission. Tolerated HD overnight.     MEDICATIONS  (STANDING):  ALBUTerol/ipratropium for Nebulization 3 milliLiter(s) Nebulizer every 6 hours  amiodarone    Tablet 200 milliGRAM(s) Oral daily  aspirin enteric coated 81 milliGRAM(s) Oral daily  atorvastatin 80 milliGRAM(s) Oral at bedtime  DOBUTamine Infusion 10.019 MICROgram(s)/kG/Min (10.4 mL/Hr) IV Continuous <Continuous>  finasteride 5 milliGRAM(s) Oral daily  levothyroxine 75 MICROGram(s) Oral daily  midodrine 30 milliGRAM(s) Oral every 8 hours    MEDICATIONS  (PRN):  benzocaine 15 mG/menthol 3.6 mG Lozenge 1 Lozenge Oral every 8 hours PRN Sore Throat      T(F): 97.3 (01-25 @ 12:25), Max: 98.2 (01-25 @ 05:19)  HR: 81 (01-25 @ 12:25) (69 - 88)  BP: 76/41 (01-25 @ 12:25) (76/41 - 112/54)  RR: 18 (01-25 @ 12:25) (13 - 19)  SpO2: 96% (01-25 @ 12:25) (96% - 100%)  CAPILLARY BLOOD GLUCOSE        I&O's Summary    24 Jan 2018 07:01  -  25 Jan 2018 07:00  --------------------------------------------------------  IN: 1043.2 mL / OUT: 2100 mL / NET: -1056.8 mL    25 Jan 2018 07:01  -  25 Jan 2018 13:23  --------------------------------------------------------  IN: 360 mL / OUT: 0 mL / NET: 360 mL        PHYSICAL EXAM:  GEN: Appears in no acute distress  HEENT: PERRLA, EOMI and accommodate, neck supple, no lymphadenopathy, no JVD  MOUTH: moist mucous membranes, no exudates or lesions   PULM: Bibasilar crackles  CV: irregular rhythm  Abdominal: Soft, nontender to palpation, non-distended, normoactive bowel sounds  Extremities: WWP, No edema, + peripheral pulses  NEURO: AAOx3, moving all four extremities spontaneously  Skin: No rashes, lesions, hematomas, ecchymosis. Left upper extremity PICC line without erythema or drainage      LABS:  Labs personally reviewed.                        12.8   5.13  )-----------( 122      ( 25 Jan 2018 07:10 )             40.3     Hgb Trend: 12.8<--, 12.6<--, 12.7<--  01-25    138  |  96<L>  |  20  ----------------------------<  137<H>  3.8   |  29  |  4.62<H>    Ca    8.4      25 Jan 2018 07:10  Phos  2.9     01-25  Mg     2.3     01-25      Creatinine Trend: 4.62<--, 6.36<--, 4.83<--  PT/INR - ( 25 Jan 2018 07:10 )   PT: 39.5 SEC;   INR: 3.35          PTT - ( 25 Jan 2018 07:10 )  PTT:55.2 SEC      RADIOLOGY & ADDITIONAL TESTS:  Imaging Personally Reviewed.    Consultants: cardiology

## 2018-01-25 NOTE — DISCHARGE NOTE ADULT - PATIENT PORTAL LINK FT
“You can access the [facility] Patient Portal, offered by [facility], by registering with the following website: [email or web address]”

## 2018-01-25 NOTE — PROGRESS NOTE ADULT - ASSESSMENT
62y Male with ESRD on HD MWF, NICM with severe LV dysfnxn (EF 21%), s/p biotronic ICD, on home dobutamine drip, Afib on coumadin, COPD on 3-4L home O2, DM, and chronic hypotension on midodrine presented with +RSV. Renal following for ESRD, for HD      ESRD on dialysis, Maintenance HD schedule MWF. electrolytes acceptable  HYpotension, chronic  CHF

## 2018-01-25 NOTE — DISCHARGE NOTE ADULT - PLAN OF CARE
Improvement in breathing Use albuterol inhaler 1-2 puffs every 4 hours as needed for wheezing or shortness of breath. Follow up with your primary care doctor within 1 week. Return to the Emergency Department for any new, worsening, and/or concerning symptoms, including but not limited to difficulty breathing, chest pain, feeling like you are going to pass out or passing out. Continue your dialysis as ordered by your kidney doctor. Continue your medications as prescribed. Continue dobutamine infusion 4 milligrams/milliliter at rate of 10.4 milliliters/hour (10 micrograms/kilogram/min) through your PICC line. Follow up with Dr. Davis within 1 week of discharge. You were found to have a hemoglobin A1c of 6.5% which is at the cutoff for diabetes mellitus. Follow up with your primary care doctor to discuss further management. You should limit excessive intake of foods that are high in simple sugars such as sodas, candies, and large amounts of sweet fruits. You should also limit excessive intake of foods that are high in complex carbohydrates such as pastas, rices, and potatoes, all of which are high in starch - a complex carbohydrate. In order to further aide in dietary changes to manage your diabtetes you should maintain a food diary for one month and record the amount of carbohydrates in each meal or snack. This should be brought to your primary care doctor's office and then further discussed. For more information about dietary recommendations you can visit the website for the National Diabetes Initiative at: http://www.ndei.org/ADA-nutrition-guidelines-2013.aspx. Continue using your supplemental oxygen. Follow up with your doctor within 1 week of discharge. Your INR was _____ on day of discharge. Take warfarin 2 mg daily. Continue taking amiodarone as prescribed. Your INR was _____ on day of discharge. Take warfarin 2 mg daily. Follow up with Dr. Davis on Monday, January 29 to have INR checked. Continue taking amiodarone as prescribed. Follow up with your nephrologist within two weeks Continue your dialysis schedule (M/W/F) as ordered by your kidney doctor. Continue your medications as prescribed. Follow up with Dr. Davis within one week Follow up with your PCP Dr. Banda within 2 weeks Follow up with your pulmonologist within two weeks Follow up with Dr. Davis within one week from discharge Continue using your supplemental oxygen and your inhalers. Follow up with your doctor within 1 week of discharge. Your INR was _____ on day of discharge. Take warfarin ____ mg daily. Follow up with Dr. Davis on _________ to have INR checked. Your amiodarone was decreased to 100 mg daily. You have pulmonary fibrosis so you should discuss with Dr. Davis if you can stop amiodarone as this can worsen your pulmonary fibrosis. You have pulmonary fibrosis, and were seen by a pulmonologist during your hospital stat. Use albuterol inhaler 1-2 puffs every 4 hours as needed for wheezing or shortness of breath. Follow up with your primary care doctor within 1 week. Return to the Emergency Department for any new, worsening, and/or concerning symptoms, including but not limited to difficulty breathing, chest pain, feeling like you are going to pass out or passing out. Continue your dialysis schedule (M/W/F) as ordered by your kidney doctor. Continue your medications as prescribed. Restrict fluid intake. Continue dobutamine infusion 4 milligrams/milliliter at rate of 10.4 milliliters/hour (10 micrograms/kilogram/min) through your PICC line. Follow up with Dr. Davis within 1 week of discharge. Your PICC line was replaced due to obstruction of the old one. Your INR was 1.7 on day of discharge. Take warfarin 3 mg daily. Follow up with Dr. Davis as soon as possible to have INR checked. Your amiodarone was decreased to 100 mg daily due to concerns for pulmonary fibrosis. Dr. Davis recommended keeping amiodarone in a lower dose given your history of athymia. During your hospital stay you were in afib and aflutter with mostly adequate heart rate.

## 2018-01-25 NOTE — DISCHARGE NOTE ADULT - MEDICATION SUMMARY - MEDICATIONS TO STOP TAKING
I will STOP taking the medications listed below when I get home from the hospital:    DOBUTamine 1 mg/mL-D5% intravenous solution  -- 7.4 mcg/kg intravenous once a day  ( Continuously)    Coumadin 2 mg oral tablet  -- 1 tab(s) by mouth once a day (at bedtime)

## 2018-01-25 NOTE — DISCHARGE NOTE ADULT - NS AS DC HF EDUCATION INSTRUCTIONS
Activities as tolerated/Low salt diet/Call Primary Care Provider for follow-up after discharge/Report weight gain of 2 or more pounds in one day or 3 or more pounds in one week, worsening shortness of breath, fatigue, weakness, increased swelling of hands and feet to primary care provider/Monitor Weight Daily

## 2018-01-25 NOTE — PROGRESS NOTE ADULT - ASSESSMENT
61y/o BM PMH: ESRD (HD M,W,F), NICM (EF 21%) NYHA Class IV ACC/AHA Stage D, ICD, PICC line in place with home dobutamine drip, AFIB on coumadin, COPD on home O2 (4-5L), hypotension on midodrine (SBP baseline 80s-90s) (follows with Dr. Davis as outpt) who presents for diarrhea and hypotension to 70s, found to have human metapneumovirus. Patient with 3 watery diarrhea bowel movement at home. Held his stool softeners  Has not had diarrhea in the hospital. Patient with supra-therapeutic INR likely due to diarrhea.   - INR down to 3.3, restart coumadin gingerly when < 3  - Cont  - have PICC line nurse evaluate PICC line as overnight nurse had some concerns regarding functionality  - Cont supportive care for metapneumovirus infection  - no evidence for decompensated HF

## 2018-01-25 NOTE — DISCHARGE NOTE ADULT - CARE PLAN
Principal Discharge DX:	Human metapneumovirus (hMPV) as cause of disease classified elsewhere  Goal:	Improvement in breathing  Assessment and plan of treatment:	Use albuterol inhaler 1-2 puffs every 4 hours as needed for wheezing or shortness of breath. Follow up with your primary care doctor within 1 week. Return to the Emergency Department for any new, worsening, and/or concerning symptoms, including but not limited to difficulty breathing, chest pain, feeling like you are going to pass out or passing out.  Secondary Diagnosis:	ESRD (end stage renal disease)  Assessment and plan of treatment:	Continue your dialysis as ordered by your kidney doctor. Continue your medications as prescribed.  Secondary Diagnosis:	Heart failure  Assessment and plan of treatment:	Continue dobutamine infusion 4 milligrams/milliliter at rate of 10.4 milliliters/hour (10 micrograms/kilogram/min) through your PICC line. Follow up with Dr. Davis within 1 week of discharge.  Secondary Diagnosis:	DM (diabetes mellitus)  Assessment and plan of treatment:	You were found to have a hemoglobin A1c of 6.5% which is at the cutoff for diabetes mellitus. Follow up with your primary care doctor to discuss further management. You should limit excessive intake of foods that are high in simple sugars such as sodas, candies, and large amounts of sweet fruits. You should also limit excessive intake of foods that are high in complex carbohydrates such as pastas, rices, and potatoes, all of which are high in starch - a complex carbohydrate. In order to further aide in dietary changes to manage your diabtetes you should maintain a food diary for one month and record the amount of carbohydrates in each meal or snack. This should be brought to your primary care doctor's office and then further discussed. For more information about dietary recommendations you can visit the website for the National Diabetes Initiative at: http://www.ndei.org/ADA-nutrition-guidelines-2013.aspx.  Secondary Diagnosis:	COPD (chronic obstructive pulmonary disease)  Assessment and plan of treatment:	Continue using your supplemental oxygen. Follow up with your doctor within 1 week of discharge.  Secondary Diagnosis:	AF (atrial fibrillation)  Assessment and plan of treatment:	Your INR was _____ on day of discharge. Take warfarin 2 mg daily. Continue taking amiodarone as prescribed. Principal Discharge DX:	Human metapneumovirus (hMPV) as cause of disease classified elsewhere  Goal:	Improvement in breathing  Assessment and plan of treatment:	Use albuterol inhaler 1-2 puffs every 4 hours as needed for wheezing or shortness of breath. Follow up with your primary care doctor within 1 week. Return to the Emergency Department for any new, worsening, and/or concerning symptoms, including but not limited to difficulty breathing, chest pain, feeling like you are going to pass out or passing out.  Secondary Diagnosis:	ESRD (end stage renal disease)  Assessment and plan of treatment:	Continue your dialysis as ordered by your kidney doctor. Continue your medications as prescribed.  Secondary Diagnosis:	Heart failure  Assessment and plan of treatment:	Continue dobutamine infusion 4 milligrams/milliliter at rate of 10.4 milliliters/hour (10 micrograms/kilogram/min) through your PICC line. Follow up with Dr. Davis within 1 week of discharge.  Secondary Diagnosis:	DM (diabetes mellitus)  Assessment and plan of treatment:	You were found to have a hemoglobin A1c of 6.5% which is at the cutoff for diabetes mellitus. Follow up with your primary care doctor to discuss further management. You should limit excessive intake of foods that are high in simple sugars such as sodas, candies, and large amounts of sweet fruits. You should also limit excessive intake of foods that are high in complex carbohydrates such as pastas, rices, and potatoes, all of which are high in starch - a complex carbohydrate. In order to further aide in dietary changes to manage your diabtetes you should maintain a food diary for one month and record the amount of carbohydrates in each meal or snack. This should be brought to your primary care doctor's office and then further discussed. For more information about dietary recommendations you can visit the website for the National Diabetes Initiative at: http://www.ndei.org/ADA-nutrition-guidelines-2013.aspx.  Secondary Diagnosis:	COPD (chronic obstructive pulmonary disease)  Assessment and plan of treatment:	Continue using your supplemental oxygen. Follow up with your doctor within 1 week of discharge.  Secondary Diagnosis:	AF (atrial fibrillation)  Assessment and plan of treatment:	Your INR was _____ on day of discharge. Take warfarin 2 mg daily. Follow up with Dr. Davis on Monday, January 29 to have INR checked. Continue taking amiodarone as prescribed. Principal Discharge DX:	Human metapneumovirus (hMPV) as cause of disease classified elsewhere  Goal:	Improvement in breathing  Assessment and plan of treatment:	Use albuterol inhaler 1-2 puffs every 4 hours as needed for wheezing or shortness of breath. Follow up with your primary care doctor within 1 week. Return to the Emergency Department for any new, worsening, and/or concerning symptoms, including but not limited to difficulty breathing, chest pain, feeling like you are going to pass out or passing out.  Secondary Diagnosis:	ESRD (end stage renal disease)  Goal:	Follow up with your nephrologist within two weeks  Assessment and plan of treatment:	Continue your dialysis schedule (M/W/F) as ordered by your kidney doctor. Continue your medications as prescribed.  Secondary Diagnosis:	Heart failure  Goal:	Follow up with Dr. Davis within one week  Assessment and plan of treatment:	Continue dobutamine infusion 4 milligrams/milliliter at rate of 10.4 milliliters/hour (10 micrograms/kilogram/min) through your PICC line. Follow up with Dr. Davis within 1 week of discharge.  Secondary Diagnosis:	DM (diabetes mellitus)  Goal:	Follow up with your PCP Dr. Banda within 2 weeks  Assessment and plan of treatment:	You were found to have a hemoglobin A1c of 6.5% which is at the cutoff for diabetes mellitus. Follow up with your primary care doctor to discuss further management. You should limit excessive intake of foods that are high in simple sugars such as sodas, candies, and large amounts of sweet fruits. You should also limit excessive intake of foods that are high in complex carbohydrates such as pastas, rices, and potatoes, all of which are high in starch - a complex carbohydrate. In order to further aide in dietary changes to manage your diabtetes you should maintain a food diary for one month and record the amount of carbohydrates in each meal or snack. This should be brought to your primary care doctor's office and then further discussed. For more information about dietary recommendations you can visit the website for the National Diabetes Initiative at: http://www.ndei.org/ADA-nutrition-guidelines-2013.aspx.  Secondary Diagnosis:	COPD (chronic obstructive pulmonary disease)  Goal:	Follow up with your pulmonologist within two weeks  Assessment and plan of treatment:	Continue using your supplemental oxygen. Follow up with your doctor within 1 week of discharge.  Secondary Diagnosis:	AF (atrial fibrillation)  Goal:	Follow up with Dr. Davis within one week from discharge  Assessment and plan of treatment:	Your INR was _____ on day of discharge. Take warfarin 2 mg daily. Follow up with Dr. Davis on Monday, January 29 to have INR checked. Continue taking amiodarone as prescribed. Principal Discharge DX:	Human metapneumovirus (hMPV) as cause of disease classified elsewhere  Goal:	Improvement in breathing  Assessment and plan of treatment:	Use albuterol inhaler 1-2 puffs every 4 hours as needed for wheezing or shortness of breath. Follow up with your primary care doctor within 1 week. Return to the Emergency Department for any new, worsening, and/or concerning symptoms, including but not limited to difficulty breathing, chest pain, feeling like you are going to pass out or passing out.  Secondary Diagnosis:	ESRD (end stage renal disease)  Goal:	Follow up with your nephrologist within two weeks  Assessment and plan of treatment:	Continue your dialysis schedule (M/W/F) as ordered by your kidney doctor. Continue your medications as prescribed.  Secondary Diagnosis:	Heart failure  Goal:	Follow up with Dr. Davis within one week  Assessment and plan of treatment:	Continue dobutamine infusion 4 milligrams/milliliter at rate of 10.4 milliliters/hour (10 micrograms/kilogram/min) through your PICC line. Follow up with Dr. Davis within 1 week of discharge.  Secondary Diagnosis:	DM (diabetes mellitus)  Goal:	Follow up with your PCP Dr. Banda within 2 weeks  Assessment and plan of treatment:	You were found to have a hemoglobin A1c of 6.5% which is at the cutoff for diabetes mellitus. Follow up with your primary care doctor to discuss further management. You should limit excessive intake of foods that are high in simple sugars such as sodas, candies, and large amounts of sweet fruits. You should also limit excessive intake of foods that are high in complex carbohydrates such as pastas, rices, and potatoes, all of which are high in starch - a complex carbohydrate. In order to further aide in dietary changes to manage your diabtetes you should maintain a food diary for one month and record the amount of carbohydrates in each meal or snack. This should be brought to your primary care doctor's office and then further discussed. For more information about dietary recommendations you can visit the website for the National Diabetes Initiative at: http://www.ndei.org/ADA-nutrition-guidelines-2013.aspx.  Secondary Diagnosis:	COPD (chronic obstructive pulmonary disease)  Goal:	Follow up with your pulmonologist within two weeks  Assessment and plan of treatment:	Continue using your supplemental oxygen and your inhalers. Follow up with your doctor within 1 week of discharge.  Secondary Diagnosis:	AF (atrial fibrillation)  Goal:	Follow up with Dr. Davis within one week from discharge  Assessment and plan of treatment:	Your INR was _____ on day of discharge. Take warfarin ____ mg daily. Follow up with Dr. Davis on _________ to have INR checked. Your amiodarone was decreased to 100 mg daily. You have pulmonary fibrosis so you should discuss with Dr. Davis if you can stop amiodarone as this can worsen your pulmonary fibrosis. Principal Discharge DX:	Human metapneumovirus (hMPV) as cause of disease classified elsewhere  Goal:	Follow up with your pulmonologist within two weeks  Assessment and plan of treatment:	You have pulmonary fibrosis, and were seen by a pulmonologist during your hospital stat. Use albuterol inhaler 1-2 puffs every 4 hours as needed for wheezing or shortness of breath. Follow up with your primary care doctor within 1 week. Return to the Emergency Department for any new, worsening, and/or concerning symptoms, including but not limited to difficulty breathing, chest pain, feeling like you are going to pass out or passing out.  Secondary Diagnosis:	ESRD (end stage renal disease)  Goal:	Follow up with your nephrologist within two weeks  Assessment and plan of treatment:	Continue your dialysis schedule (M/W/F) as ordered by your kidney doctor. Continue your medications as prescribed. Restrict fluid intake.  Secondary Diagnosis:	Heart failure  Goal:	Follow up with Dr. Davis within one week  Assessment and plan of treatment:	Continue dobutamine infusion 4 milligrams/milliliter at rate of 10.4 milliliters/hour (10 micrograms/kilogram/min) through your PICC line. Follow up with Dr. Davis within 1 week of discharge. Your PICC line was replaced due to obstruction of the old one.  Secondary Diagnosis:	DM (diabetes mellitus)  Goal:	Follow up with your PCP Dr. Banda within 2 weeks  Assessment and plan of treatment:	You were found to have a hemoglobin A1c of 6.5% which is at the cutoff for diabetes mellitus. Follow up with your primary care doctor to discuss further management. You should limit excessive intake of foods that are high in simple sugars such as sodas, candies, and large amounts of sweet fruits. You should also limit excessive intake of foods that are high in complex carbohydrates such as pastas, rices, and potatoes, all of which are high in starch - a complex carbohydrate. In order to further aide in dietary changes to manage your diabtetes you should maintain a food diary for one month and record the amount of carbohydrates in each meal or snack. This should be brought to your primary care doctor's office and then further discussed. For more information about dietary recommendations you can visit the website for the National Diabetes Initiative at: http://www.ndei.org/ADA-nutrition-guidelines-2013.aspx.  Secondary Diagnosis:	COPD (chronic obstructive pulmonary disease)  Goal:	Follow up with your pulmonologist within two weeks  Assessment and plan of treatment:	Continue using your supplemental oxygen and your inhalers. Follow up with your doctor within 1 week of discharge.  Secondary Diagnosis:	AF (atrial fibrillation)  Goal:	Follow up with Dr. Davis within one week from discharge  Assessment and plan of treatment:	Your INR was 1.7 on day of discharge. Take warfarin 3 mg daily. Follow up with Dr. Davis as soon as possible to have INR checked. Your amiodarone was decreased to 100 mg daily due to concerns for pulmonary fibrosis. Dr. Davis recommended keeping amiodarone in a lower dose given your history of athymia. During your hospital stay you were in afib and aflutter with mostly adequate heart rate.

## 2018-01-25 NOTE — PROGRESS NOTE ADULT - SUBJECTIVE AND OBJECTIVE BOX
SUBJECTIVE:  No Cp  still coughing    MEDICATIONS:  amiodarone    Tablet 200 milliGRAM(s) Oral daily  DOBUTamine Infusion 10.019 MICROgram(s)/kG/Min IV Continuous <Continuous>  midodrine 30 milliGRAM(s) Oral every 8 hours      ALBUTerol/ipratropium for Nebulization 3 milliLiter(s) Nebulizer every 6 hours        atorvastatin 80 milliGRAM(s) Oral at bedtime  finasteride 5 milliGRAM(s) Oral daily  levothyroxine 75 MICROGram(s) Oral daily    aspirin enteric coated 81 milliGRAM(s) Oral daily      REVIEW OF SYSTEMS:    CONSTITUTIONAL: No fever, weight loss, or fatigue  EYES: No eye pain, visual disturbances, or discharge  NECK: No pain or stiffness  RESPIRATORY: No cough, wheezing, chills or hemoptysis; No Shortness of Breath  CARDIOVASCULAR: No chest pain, palpitations, dizziness, or leg swelling  GASTROINTESTINAL: No abdominal or epigastric pain. No nausea, vomiting, or hematemesis; No diarrhea or constipation. No melena or hematochezia.  GENITOURINARY: No dysuria, frequency, hematuria, or incontinence  NEUROLOGICAL: No headaches, memory loss, loss of strength, numbness, or tremors  SKIN: No itching, burning, rashes, or lesions   LYMPH Nodes: No enlarged glands  MUSCULOSKELETAL: No joint pain or swelling; No muscle, back, or extremity pain  All other review of systems are negative.  	  [ ] Unable to obtain    PHYSICAL EXAM:  T(C): 36.8 (01-25-18 @ 05:19), Max: 36.8 (01-25-18 @ 05:19)  HR: 79 (01-25-18 @ 05:19) (69 - 88)  BP: 88/54 (01-25-18 @ 05:19) (83/46 - 112/54)  RR: 13 (01-25-18 @ 05:19) (13 - 19)  SpO2: 96% (01-25-18 @ 05:19) (96% - 100%)  Wt(kg): --  I&O's Summary    24 Jan 2018 07:01  -  25 Jan 2018 07:00  --------------------------------------------------------  IN: 1043.2 mL / OUT: 2100 mL / NET: -1056.8 mL          PHYSICAL EXAM    Appearance: Normal	  HEENT:   Normal oral mucosa, PERRL, EOMI	  NECK: Soft and supple, No LAD, No JVD  Cardiovascular: IRRegular Rate and Rhythm, Normal S1 S2, No murmurs, No clicks, gallops or rubs  Respiratory: crackles at bases  Gastrointestinal:  Soft, Non-tender, + BS	  Skin: No rashes, No ecchymoses, No cyanosis  Neurologic: Non-focal  Extremities: No clubbing, cyanosis or edema  Vascular: Peripheral pulses palpable 2+ bilaterally      LABS:	 	                            12.8   5.13  )-----------( 122      ( 25 Jan 2018 07:10 )             40.3     01-25    138  |  96<L>  |  20  ----------------------------<  137<H>  3.8   |  29  |  4.62<H>    Ca    8.4      25 Jan 2018 07:10  Phos  2.9     01-25  Mg     2.3     01-25    TPro  8.0  /  Alb  3.2<L>  /  TBili  0.5  /  DBili  x   /  AST  32  /  ALT  26  /  AlkPhos  183<H>  01-23    proBNP:   Lipid Profile:   HgA1c: Hemoglobin A1C, Whole Blood: 6.5 % (01-24 @ 10:45)    TSH:

## 2018-01-25 NOTE — PROGRESS NOTE ADULT - ASSESSMENT
62yM w/pmhx ESRD (HD MWF), NICM (EF 21%), ICD, PICC line in place with home dobutamine drip, afib on coumadin, COPD on home O2 (4-5L), hypotension on midodrine (SBP baseline 80s-90s) (follows with Dr. Davis) who presents for diarrhea and hypotension to 70s, found to have human metapneumovirus. Patient with 3 watery diarrhea bowel movement at home. Held his stool softners.  Has not had diarrhea in the hospital. Patient with supra-therapeutic INR likely due to diarrhea.

## 2018-01-25 NOTE — DISCHARGE NOTE ADULT - MEDICATION SUMMARY - MEDICATIONS TO TAKE
I will START or STAY ON the medications listed below when I get home from the hospital:    finasteride 5 mg oral tablet  -- 1 tab(s) by mouth once a day  -- Indication: For Home med    Ecotrin Adult Low Strength 81 mg oral delayed release tablet  -- 1 tab(s) by mouth once a day  -- Indication: For Prophylaxis    amiodarone 100 mg oral tablet  -- 1 tab(s) by mouth once a day   -- Avoid grapefruit and grapefruit juice while taking this medication.  Avoid prolonged or excessive exposure to direct and/or artificial sunlight while taking this medication.  Do not take this drug if you are pregnant.  It is very important that you take or use this exactly as directed.  Do not skip doses or discontinue unless directed by your doctor.  May cause drowsiness or dizziness.    -- Indication: For Heart failure    Coumadin 3 mg oral tablet  -- 1 tab(s) by mouth once a day (at bedtime)   -- Do not take this drug if you are pregnant.  It is very important that you take or use this exactly as directed.  Do not skip doses or discontinue unless directed by your doctor.  Obtain medical advice before taking any non-prescription drugs as some may affect the action of this medication.    -- Indication: For AF (atrial fibrillation)    atorvastatin 80 mg oral tablet  -- 1 tab(s) by mouth once a day (at bedtime)  -- Indication: For Hyperlipidemia    budesonide-formoterol 160 mcg-4.5 mcg/inh inhalation aerosol  -- 2 puff(s) inhaled 2 times a day  -- Indication: For shortness of breath     ProAir HFA 90 mcg/inh inhalation aerosol  -- 2 puff(s) inhaled every 6 hours, As Needed for wheezing  -- For inhalation only.  It is very important that you take or use this exactly as directed.  Do not skip doses or discontinue unless directed by your doctor.  Obtain medical advice before taking any non-prescription drugs as some may affect the action of this medication.  Shake well before use.    -- Indication: For shortness of breath    DOBUTamine  -- infusion rate as prescribed    -- Indication: For Hypotension     senna oral tablet  -- 2 tab(s) by mouth once a day (at bedtime)  -- Indication: For COnstipation    docusate sodium 100 mg oral capsule  -- 1 cap(s) by mouth 2 times a day  -- Indication: For COnstipation    midodrine 10 mg oral tablet  -- 3 tab(s) by mouth 3 times a day  -- It is very important that you take or use this exactly as directed.  Do not skip doses or discontinue unless directed by your doctor.  Obtain medical advice before taking any non-prescription drugs as some may affect the action of this medication.    -- Indication: For Hypotension    Synthroid 75 mcg (0.075 mg) oral tablet  -- 1 tab(s) by mouth once a day  -- Indication: For Hypothyroidism    Carrie-Tiffanie oral tablet  -- 1 tab(s) by mouth once a day  -- Indication: For Nutrition

## 2018-01-25 NOTE — PROGRESS NOTE ADULT - PROBLEM SELECTOR PLAN 4
- patient with EF less than 10% as per Dr. Davis (cardiologist), further recs appreciated.   - Patient on fixed dose dobutamine gtt at home, patient rate currently 10.4 mL/hr of concentration 1000 mg/250 mL, which is 10 mcg/kg/min based on patient's current weight. Per cardiology, continue current rate

## 2018-01-25 NOTE — DISCHARGE NOTE ADULT - CARE PROVIDER_API CALL
Thaddeus Davis (MD), Cardiovascular Disease; Internal Medicine  [address] [facility] [address]  [place], [place] [id number]  Phone: [phone]  Fax: [phone]    Solo Ridley), Internal Medicine  [address] [place] [address]  Phone: [phone]  Fax: [phone]

## 2018-01-25 NOTE — DISCHARGE NOTE ADULT - SECONDARY DIAGNOSIS.
ESRD (end stage renal disease) Heart failure DM (diabetes mellitus) COPD (chronic obstructive pulmonary disease) AF (atrial fibrillation)

## 2018-01-25 NOTE — DISCHARGE NOTE ADULT - MEDICATION SUMMARY - MEDICATIONS TO CHANGE
I will SWITCH the dose or number of times a day I take the medications listed below when I get home from the hospital:    Coumadin 5 mg oral tablet  -- 1 tab(s) by mouth once a day (at bedtime) as directed  -- Do not take this drug if you are pregnant.  It is very important that you take or use this exactly as directed.  Do not skip doses or discontinue unless directed by your doctor.  Obtain medical advice before taking any non-prescription drugs as some may affect the action of this medication.

## 2018-01-25 NOTE — PROGRESS NOTE ADULT - PROBLEM SELECTOR PLAN 6
- patient with paroxysmal afib on coumadin.  - INR supratherapeutic, holding coumadin.   - c/w amiodarone 200mg PO daily  - Monitor on telemetry, atrial flutter HR 80s

## 2018-01-25 NOTE — PROGRESS NOTE ADULT - SUBJECTIVE AND OBJECTIVE BOX
Pt seen and examined at bedside  Small improvement, but still with HAY[place]. +cough.     Allergies:  No Known Allergies    Hospital Medications:   MEDICATIONS  (STANDING):  ALBUTerol/ipratropium for Nebulization 3 milliLiter(s) Nebulizer every 6 hours  amiodarone    Tablet 200 milliGRAM(s) Oral daily  aspirin enteric coated 81 milliGRAM(s) Oral daily  atorvastatin 80 milliGRAM(s) Oral at bedtime  DOBUTamine Infusion 10.019 MICROgram(s)/kG/Min (10.4 mL/Hr) IV Continuous <Continuous>  finasteride 5 milliGRAM(s) Oral daily  levothyroxine 75 MICROGram(s) Oral daily  midodrine 30 milliGRAM(s) Oral every 8 hours      VITALS:  T(F): 98 (01-25-18 @ 16:25), Max: 98.2 (01-25-18 @ 05:19)  HR: 78 (01-25-18 @ 16:25)  BP: 80/42 (01-25-18 @ 16:25)  RR: 18 (01-25-18 @ 16:25)  SpO2: 96% (01-25-18 @ 16:25)  Wt(kg): --    01-24 @ 07:01 - 01-25 @ 07:00  --------------------------------------------------------  IN: 1043.2 mL / OUT: 2100 mL / NET: -1056.8 mL    01-25 @ 07:01 - 01-25 @ 17:24  --------------------------------------------------------  IN: 360 mL / OUT: 0 mL / NET: 360 mL    PHYSICAL EXAM:  Constitutional: NAD  HEENT: anicteric sclera, oropharynx clear, MMM  Neck: No JVD  Respiratory: bibasilar crackles   Cardiovascular: S1, S2, RRR  Gastrointestinal: BS+, soft, NT/ND  Extremities: No cyanosis or clubbing. +peripheral edema  Neurological: A/O x 3, no focal deficits  Psychiatric: Normal mood, normal affect  : No CVA tenderness. No rosa.   Skin: No rashes  Vascular Access: R IJ tunneled cath     LABS:  01-25    138  |  96<L>  |  20  ----------------------------<  137<H>  3.8   |  29  |  4.62<H>    Ca    8.4      25 Jan 2018 07:10  Phos  2.9     01-25  Mg     2.3     01-25      Creatinine Trend: 4.62 <--, 6.36 <--, 4.83 <--                        12.8   5.13  )-----------( 122      ( 25 Jan 2018 07:10 )             40.3     Urine Studies:      RADIOLOGY & ADDITIONAL STUDIES:

## 2018-01-25 NOTE — DISCHARGE NOTE ADULT - ADDITIONAL INSTRUCTIONS
Follow up with Dr. Davis  Continue with dialysis on Monday, Wednesday, Friday  Follow up with pulmonologist Follow up with Dr. Davis cardiologist   Continue with dialysis on Monday, Wednesday, Friday  Follow up with pulmonologist

## 2018-01-26 LAB
APTT BLD: 44.9 SEC — HIGH (ref 27.5–37.4)
BUN SERPL-MCNC: 35 MG/DL — HIGH (ref 7–23)
CALCIUM SERPL-MCNC: 8.3 MG/DL — LOW (ref 8.4–10.5)
CHLORIDE SERPL-SCNC: 93 MMOL/L — LOW (ref 98–107)
CO2 SERPL-SCNC: 26 MMOL/L — SIGNIFICANT CHANGE UP (ref 22–31)
CREAT SERPL-MCNC: 5.95 MG/DL — HIGH (ref 0.5–1.3)
GLUCOSE SERPL-MCNC: 117 MG/DL — HIGH (ref 70–99)
HCT VFR BLD CALC: 38.6 % — LOW (ref 39–50)
HGB BLD-MCNC: 12 G/DL — LOW (ref 13–17)
INR BLD: 1.95 — HIGH (ref 0.88–1.17)
MAGNESIUM SERPL-MCNC: 2 MG/DL — SIGNIFICANT CHANGE UP (ref 1.6–2.6)
MCHC RBC-ENTMCNC: 30.2 PG — SIGNIFICANT CHANGE UP (ref 27–34)
MCHC RBC-ENTMCNC: 31.1 % — LOW (ref 32–36)
MCV RBC AUTO: 97 FL — SIGNIFICANT CHANGE UP (ref 80–100)
NRBC # FLD: 0 — SIGNIFICANT CHANGE UP
PHOSPHATE SERPL-MCNC: 3.5 MG/DL — SIGNIFICANT CHANGE UP (ref 2.5–4.5)
PLATELET # BLD AUTO: 124 K/UL — LOW (ref 150–400)
PMV BLD: 11.6 FL — SIGNIFICANT CHANGE UP (ref 7–13)
POTASSIUM SERPL-MCNC: 4 MMOL/L — SIGNIFICANT CHANGE UP (ref 3.5–5.3)
POTASSIUM SERPL-SCNC: 4 MMOL/L — SIGNIFICANT CHANGE UP (ref 3.5–5.3)
PROTHROM AB SERPL-ACNC: 21.9 SEC — HIGH (ref 9.8–13.1)
RBC # BLD: 3.98 M/UL — LOW (ref 4.2–5.8)
RBC # FLD: 16.4 % — HIGH (ref 10.3–14.5)
SODIUM SERPL-SCNC: 132 MMOL/L — LOW (ref 135–145)
WBC # BLD: 4.82 K/UL — SIGNIFICANT CHANGE UP (ref 3.8–10.5)
WBC # FLD AUTO: 4.82 K/UL — SIGNIFICANT CHANGE UP (ref 3.8–10.5)

## 2018-01-26 PROCEDURE — 99233 SBSQ HOSP IP/OBS HIGH 50: CPT | Mod: GC

## 2018-01-26 RX ORDER — DOCUSATE SODIUM 100 MG
100 CAPSULE ORAL
Qty: 0 | Refills: 0 | Status: DISCONTINUED | OUTPATIENT
Start: 2018-01-26 | End: 2018-01-31

## 2018-01-26 RX ORDER — BENZOCAINE AND MENTHOL 5; 1 G/100ML; G/100ML
1 LIQUID ORAL EVERY 6 HOURS
Qty: 0 | Refills: 0 | Status: DISCONTINUED | OUTPATIENT
Start: 2018-01-26 | End: 2018-01-31

## 2018-01-26 RX ORDER — SENNA PLUS 8.6 MG/1
2 TABLET ORAL
Qty: 0 | Refills: 0 | COMMUNITY
Start: 2018-01-26

## 2018-01-26 RX ORDER — WARFARIN SODIUM 2.5 MG/1
2 TABLET ORAL ONCE
Qty: 0 | Refills: 0 | Status: COMPLETED | OUTPATIENT
Start: 2018-01-26 | End: 2018-01-26

## 2018-01-26 RX ORDER — POLYETHYLENE GLYCOL 3350 17 G/17G
17 POWDER, FOR SOLUTION ORAL DAILY
Qty: 0 | Refills: 0 | Status: DISCONTINUED | OUTPATIENT
Start: 2018-01-26 | End: 2018-01-31

## 2018-01-26 RX ORDER — SENNA PLUS 8.6 MG/1
2 TABLET ORAL AT BEDTIME
Qty: 0 | Refills: 0 | Status: DISCONTINUED | OUTPATIENT
Start: 2018-01-26 | End: 2018-01-31

## 2018-01-26 RX ORDER — DOCUSATE SODIUM 100 MG
1 CAPSULE ORAL
Qty: 0 | Refills: 0 | COMMUNITY
Start: 2018-01-26

## 2018-01-26 RX ADMIN — Medication 75 MICROGRAM(S): at 04:45

## 2018-01-26 RX ADMIN — Medication 3 MILLILITER(S): at 15:49

## 2018-01-26 RX ADMIN — Medication 3 MILLILITER(S): at 04:08

## 2018-01-26 RX ADMIN — FINASTERIDE 5 MILLIGRAM(S): 5 TABLET, FILM COATED ORAL at 13:08

## 2018-01-26 RX ADMIN — SENNA PLUS 2 TABLET(S): 8.6 TABLET ORAL at 23:03

## 2018-01-26 RX ADMIN — WARFARIN SODIUM 2 MILLIGRAM(S): 2.5 TABLET ORAL at 18:15

## 2018-01-26 RX ADMIN — MIDODRINE HYDROCHLORIDE 30 MILLIGRAM(S): 2.5 TABLET ORAL at 13:08

## 2018-01-26 RX ADMIN — ATORVASTATIN CALCIUM 80 MILLIGRAM(S): 80 TABLET, FILM COATED ORAL at 23:03

## 2018-01-26 RX ADMIN — Medication 10.4 MICROGRAM(S)/KG/MIN: at 13:08

## 2018-01-26 RX ADMIN — Medication 3 MILLILITER(S): at 21:42

## 2018-01-26 RX ADMIN — Medication 100 MILLIGRAM(S): at 23:03

## 2018-01-26 RX ADMIN — MIDODRINE HYDROCHLORIDE 30 MILLIGRAM(S): 2.5 TABLET ORAL at 23:03

## 2018-01-26 RX ADMIN — MIDODRINE HYDROCHLORIDE 30 MILLIGRAM(S): 2.5 TABLET ORAL at 04:19

## 2018-01-26 RX ADMIN — Medication 81 MILLIGRAM(S): at 13:08

## 2018-01-26 RX ADMIN — Medication 3 MILLILITER(S): at 11:58

## 2018-01-26 RX ADMIN — AMIODARONE HYDROCHLORIDE 200 MILLIGRAM(S): 400 TABLET ORAL at 04:45

## 2018-01-26 NOTE — PROGRESS NOTE ADULT - SUBJECTIVE AND OBJECTIVE BOX
Patient is a 62y old  Male who presents with a chief complaint of Diarrhea (25 Jan 2018 18:10)    SUBJECTIVE / OVERNIGHT EVENTS:  No acute overnight events. Patient endorsed a dry mouth, sob, constipation and non-productive cough. Patient denied fever, chills, nausea, vomiting, diarrhea, chest pain, abdominal pain, focal weakness. On telemetry patient with permanent atrial fibrillation HR-70s, with occasional PVCs.  On dobutamine drip 10.4 mL/hr.     MEDICATIONS  (STANDING):  ALBUTerol/ipratropium for Nebulization 3 milliLiter(s) Nebulizer every 6 hours  amiodarone    Tablet 200 milliGRAM(s) Oral daily  aspirin enteric coated 81 milliGRAM(s) Oral daily  atorvastatin 80 milliGRAM(s) Oral at bedtime  DOBUTamine Infusion 10.019 MICROgram(s)/kG/Min (10.4 mL/Hr) IV Continuous <Continuous>  finasteride 5 milliGRAM(s) Oral daily  levothyroxine 75 MICROGram(s) Oral daily  midodrine 30 milliGRAM(s) Oral every 8 hours    MEDICATIONS  (PRN):  benzocaine 15 mG/menthol 3.6 mG Lozenge 1 Lozenge Oral every 8 hours PRN Sore Throat    Vital Signs Last 24 Hrs  T(C): 36.6 (26 Jan 2018 06:30), Max: 37.3 (25 Jan 2018 19:30)  T(F): 97.8 (26 Jan 2018 06:30), Max: 99.1 (25 Jan 2018 19:30)  HR: 73 (26 Jan 2018 06:30) (66 - 86)  BP: 89/57 (26 Jan 2018 06:30) (76/41 - 107/59)  RR: 18 (26 Jan 2018 06:30) (14 - 18)  SpO2: 96% (26 Jan 2018 04:38) (96% - 98%)  CAPILLARY BLOOD GLUCOSE    I&O's Summary    25 Jan 2018 07:01  -  26 Jan 2018 07:00  --------------------------------------------------------  IN: 732.4 mL / OUT: 0 mL / NET: 732.4 mL    PHYSICAL EXAM:  GENERAL: NAD, well-developed  HEAD:  Atraumatic, Normocephalic  EYES: EOMI, PERRLA, conjunctiva and sclera clear  NECK: Supple, No JVD  CHEST/LUNG: Mostly clear to auscultation bilaterally; soft crackles   HEART: irregular rhythm; Normal S1 S2, No murmurs, rubs, or gallops  ABDOMEN: Soft, Nontender, Nondistended; Bowel sounds present  EXTREMITIES:  2+ Peripheral Pulses, No clubbing, cyanosis, or edema  PSYCH: AAOx3  NEUROLOGY: non-focal  SKIN: No rashes or lesions    LABS:             12.0   4.82  )-----------( 124      ( 26 Jan 2018 06:45 )             38.6     01-26    132<L>  |  93<L>  |  35<H>  ----------------------------<  117<H>  4.0   |  26  |  5.95<H>    Ca    8.3<L>      26 Jan 2018 06:45  Phos  3.5     01-26  Mg     2.0     01-26    PT/INR - ( 26 Jan 2018 06:45 )   PT: 21.9 SEC;   INR: 1.95     PTT - ( 26 Jan 2018 06:45 )  PTT:44.9 SEC    RADIOLOGY & ADDITIONAL TESTS: No new imaging.     Imaging Personally Reviewed:    Consultant(s) Notes Reviewed:      Care Discussed with Consultants/Other Providers: Patient is a 62y old  Male who presents with a chief complaint of Diarrhea (25 Jan 2018 18:10)    SUBJECTIVE / OVERNIGHT EVENTS:  No acute overnight events. Patient endorsed a dry mouth, sob, constipation and non-productive cough. Patient denied fever, chills, nausea, vomiting, diarrhea, chest pain, abdominal pain, focal weakness. On telemetry patient with permanent atrial fibrillation HR-70s, with occasional PVCs.  On dobutamine drip 10.4 mL/hr.     MEDICATIONS  (STANDING):  ALBUTerol/ipratropium for Nebulization 3 milliLiter(s) Nebulizer every 6 hours  amiodarone    Tablet 200 milliGRAM(s) Oral daily  aspirin enteric coated 81 milliGRAM(s) Oral daily  atorvastatin 80 milliGRAM(s) Oral at bedtime  DOBUTamine Infusion 10.019 MICROgram(s)/kG/Min (10.4 mL/Hr) IV Continuous <Continuous>  finasteride 5 milliGRAM(s) Oral daily  levothyroxine 75 MICROGram(s) Oral daily  midodrine 30 milliGRAM(s) Oral every 8 hours    MEDICATIONS  (PRN):  benzocaine 15 mG/menthol 3.6 mG Lozenge 1 Lozenge Oral every 8 hours PRN Sore Throat    Vital Signs Last 24 Hrs  T(C): 36.6 (26 Jan 2018 06:30), Max: 37.3 (25 Jan 2018 19:30)  T(F): 97.8 (26 Jan 2018 06:30), Max: 99.1 (25 Jan 2018 19:30)  HR: 73 (26 Jan 2018 06:30) (66 - 86)  BP: 89/57 (26 Jan 2018 06:30) (76/41 - 107/59)  RR: 18 (26 Jan 2018 06:30) (14 - 18)  SpO2: 96% (26 Jan 2018 04:38) (96% - 98%)  CAPILLARY BLOOD GLUCOSE    I&O's Summary    25 Jan 2018 07:01  -  26 Jan 2018 07:00  --------------------------------------------------------  IN: 732.4 mL / OUT: 0 mL / NET: 732.4 mL    PHYSICAL EXAM:  GENERAL: NAD, well-developed  HEAD:  Atraumatic, Normocephalic  EYES: EOMI, PERRLA, conjunctiva and sclera clear  NECK: Supple, No JVD  CHEST/LUNG: Mostly clear to auscultation bilaterally; soft crackles   HEART: irregular rhythm; Normal S1 S2, No murmurs, rubs, or gallops  ABDOMEN: Soft, Nontender, Nondistended; Bowel sounds present  EXTREMITIES:  2+ Peripheral Pulses, No clubbing, cyanosis, or edema  PSYCH: AAOx3  NEUROLOGY: non-focal  SKIN: No rashes or lesions    LABS:             12.0   4.82  )-----------( 124      ( 26 Jan 2018 06:45 )             38.6     01-26    132<L>  |  93<L>  |  35<H>  ----------------------------<  117<H>  4.0   |  26  |  5.95<H>    Ca    8.3<L>      26 Jan 2018 06:45  Phos  3.5     01-26  Mg     2.0     01-26    PT/INR - ( 26 Jan 2018 06:45 )   PT: 21.9 SEC;   INR: 1.95     PTT - ( 26 Jan 2018 06:45 )  PTT:44.9 SEC    RADIOLOGY & ADDITIONAL TESTS: No new imaging.     Imaging Personally Reviewed:    Consultant(s) Notes Reviewed:  Cards     Care Discussed with Consultants/Other Providers: Cards

## 2018-01-26 NOTE — PROGRESS NOTE ADULT - PROBLEM SELECTOR PLAN 1
Pt tolerated HD today, with UF goal 1 kg achieved.   Electrolytes acceptable.   Cont midodrine with HD.

## 2018-01-26 NOTE — PROGRESS NOTE ADULT - SUBJECTIVE AND OBJECTIVE BOX
Pt seen and examined at bedside  Improved cough and SOB. Denies pain.    Allergies:  No Known Allergies    Hospital Medications:   MEDICATIONS  (STANDING):  ALBUTerol/ipratropium for Nebulization 3 milliLiter(s) Nebulizer every 6 hours  amiodarone    Tablet 200 milliGRAM(s) Oral daily  aspirin enteric coated 81 milliGRAM(s) Oral daily  atorvastatin 80 milliGRAM(s) Oral at bedtime  DOBUTamine Infusion 10.019 MICROgram(s)/kG/Min (10.4 mL/Hr) IV Continuous <Continuous>  docusate sodium 100 milliGRAM(s) Oral two times a day  finasteride 5 milliGRAM(s) Oral daily  levothyroxine 75 MICROGram(s) Oral daily  midodrine 30 milliGRAM(s) Oral every 8 hours  senna 2 Tablet(s) Oral at bedtime  warfarin 2 milliGRAM(s) Oral once      VITALS:  T(F): 97.7 (01-26-18 @ 13:10), Max: 99.1 (01-25-18 @ 19:30)  HR: 79 (01-26-18 @ 15:50)  BP: 89/55 (01-26-18 @ 13:10)  RR: 18 (01-26-18 @ 13:10)  SpO2: 97% (01-26-18 @ 15:50)  Wt(kg): --    01-25 @ 07:01 - 01-26 @ 07:00  --------------------------------------------------------  IN: 732.4 mL / OUT: 0 mL / NET: 732.4 mL    01-26 @ 07:01 - 01-26 @ 15:57  --------------------------------------------------------  IN: 500 mL / OUT: 1500 mL / NET: -1000 mL      PHYSICAL EXAM:  Constitutional: NAD  HEENT: anicteric sclera, oropharynx clear, MMM  Neck: No JVD  Respiratory: CTAB, no wheezes, rales or rhonchi  Cardiovascular: S1, S2, RRR  Gastrointestinal: BS+, soft, NT/ND  Extremities: No cyanosis or clubbing. Trace peripheral edema  Neurological: A/O x 3, no focal deficits  Psychiatric: Normal mood, normal affect  : No CVA tenderness. No rosa.   Skin: No rashes  Vascular Access: RIJ Tunneled cath     LABS:  01-26    132<L>  |  93<L>  |  35<H>  ----------------------------<  117<H>  4.0   |  26  |  5.95<H>    Ca    8.3<L>      26 Jan 2018 06:45  Phos  3.5     01-26  Mg     2.0     01-26      Creatinine Trend: 5.95 <--, 4.62 <--, 6.36 <--, 4.83 <--                        12.0   4.82  )-----------( 124      ( 26 Jan 2018 06:45 )             38.6     Urine Studies:      RADIOLOGY & ADDITIONAL STUDIES:

## 2018-01-26 NOTE — CHART NOTE - NSCHARTNOTEFT_GEN_A_CORE
Mr. Plascencia is a 62 year old Male with ESRD on hemodialysis (MWF),  Heart Failure with EF less than 21% with ICD and PICC line in place with home dobutamine drip, persistent afib on coumadin, COPD on home O2 (4L), chronic hypotension on midodrine baseline Systolic blood pressure is around 80s-90s. Due to his multiple medical problems, patient needs a wheelchair with gel cushion to get around his house.     Gurpreet Garay  PGY-1 Resident Physician   Pager [phone] or [phone] from 7am to 7pm, after 7pm page night float

## 2018-01-26 NOTE — PROGRESS NOTE ADULT - ATTENDING COMMENTS
62M ESRD (HD MWF), NICM (EF 21%), ICD, PICC line in place with home dobutamine drip, afib on coumadin, COPD on home O2 (4-5L), hypotension on midodrine (SBP baseline 80s-90s) who presents for diarrhea and hypotension to 70s, human metapneumovirus  --continue supportive care for hPMV, bronchodilators.  No further diarrhea, but pt still weak and wheezy  --severe chronic systolic CHF, appreciate cardiology recs--stable on IV inotrope  --nephrology following for continuing HD  --resume warfarin when INR<3  --if resp status improves plan for d/c home tomorrow 62M ESRD (HD MWF), NICM (EF 21%), ICD, PICC line in place with home dobutamine drip, afib on coumadin, COPD on home O2 (4-5L), hypotension on midodrine (SBP baseline 80s-90s) who presents for diarrhea and hypotension to 70s, human metapneumovirus  --continue supportive care for hPMV, bronchodilators.  No further diarrhea, but pt still weak and wheezy. Tomorrow contact and droplet isolation can be d/jonathan  --severe chronic systolic CHF, appreciate cardiology recs--stable on IV inotrope  --nephrology following for continuing HD  --resume warfarin when INR<3  --if resp status improves plan for d/c home tomorrow

## 2018-01-26 NOTE — PHYSICAL THERAPY INITIAL EVALUATION ADULT - PERTINENT HX OF CURRENT PROBLEM, REHAB EVAL
62yM w/pmhx ESRD (HD MWF), NICM (EF 21%), ICD, PICC line in place with home dobutamine drip, afib on coumadin, COPD on home O2 (4-5L), hypotension on midodrine (SBP baseline 80s-90s) (follows with Dr. Davis) who presents for diarrhea and hypotension to 70s, found to have human metapneumovirus.

## 2018-01-26 NOTE — PROGRESS NOTE ADULT - PROBLEM SELECTOR PLAN 2
- Started senna at bedtime. Will consider adding other meds. - Started senna and colace at bedtime.   - Monitor for diarrhea.

## 2018-01-26 NOTE — PROGRESS NOTE ADULT - SUBJECTIVE AND OBJECTIVE BOX
No chest pain, dyspnea, abdominal pain or diarrhea  Currently undergoing HD  BP 89/57  HR 73  Lungs clear  RR 1/6 systolic murmur  No edema    INR 3.35   WBC 4.82    Imp: End stage cardiomyopathy on Dobutamine and HD.  Patient is in his optimal medical / cardiac condition for DC to home

## 2018-01-26 NOTE — PROGRESS NOTE ADULT - PROBLEM SELECTOR PLAN 6
- patient with paroxysmal afib on coumadin.  - INR 1.95, will start coumadin.   - c/w amiodarone 200mg PO daily  - Monitor on telemetry, atrial flutter HR 80s - patient with paroxysmal afib on coumadin.  - INR 1.95, will start home dose coumadin 2mg.   - c/w amiodarone 200mg PO daily  - Monitor on telemetry, atrial flutter HR 80s

## 2018-01-26 NOTE — PROGRESS NOTE ADULT - PROBLEM SELECTOR PLAN 1
- on NC 4L, goal of sat >92%   -  Symptomatic treatment with duonebs q6 hr  -  Isolation Contact precautions   - CTM closely resp status  - blood streaks likely due to mucosal irritation from HMPV in setting of supratherapeutic INR, lung exam unchanged, will continue to monitor - on NC 4L, goal of sat >92%   -  Symptomatic treatment with duonebs q6 hr  -  Isolation Contact precautions   - CTM closely resp status

## 2018-01-27 LAB
BASOPHILS # BLD AUTO: 0.03 K/UL — SIGNIFICANT CHANGE UP (ref 0–0.2)
BASOPHILS NFR BLD AUTO: 0.6 % — SIGNIFICANT CHANGE UP (ref 0–2)
BUN SERPL-MCNC: 30 MG/DL — HIGH (ref 7–23)
CALCIUM SERPL-MCNC: 8.5 MG/DL — SIGNIFICANT CHANGE UP (ref 8.4–10.5)
CHLORIDE SERPL-SCNC: 95 MMOL/L — LOW (ref 98–107)
CO2 SERPL-SCNC: 24 MMOL/L — SIGNIFICANT CHANGE UP (ref 22–31)
CREAT SERPL-MCNC: 5 MG/DL — HIGH (ref 0.5–1.3)
EOSINOPHIL # BLD AUTO: 0.17 K/UL — SIGNIFICANT CHANGE UP (ref 0–0.5)
EOSINOPHIL NFR BLD AUTO: 3.2 % — SIGNIFICANT CHANGE UP (ref 0–6)
GLUCOSE SERPL-MCNC: 120 MG/DL — HIGH (ref 70–99)
HCT VFR BLD CALC: 38.5 % — LOW (ref 39–50)
HGB BLD-MCNC: 12 G/DL — LOW (ref 13–17)
IMM GRANULOCYTES # BLD AUTO: 0.01 # — SIGNIFICANT CHANGE UP
IMM GRANULOCYTES NFR BLD AUTO: 0.2 % — SIGNIFICANT CHANGE UP (ref 0–1.5)
INR BLD: 1.45 — HIGH (ref 0.88–1.17)
LYMPHOCYTES # BLD AUTO: 1.09 K/UL — SIGNIFICANT CHANGE UP (ref 1–3.3)
LYMPHOCYTES # BLD AUTO: 20.5 % — SIGNIFICANT CHANGE UP (ref 13–44)
MAGNESIUM SERPL-MCNC: 1.9 MG/DL — SIGNIFICANT CHANGE UP (ref 1.6–2.6)
MCHC RBC-ENTMCNC: 30.4 PG — SIGNIFICANT CHANGE UP (ref 27–34)
MCHC RBC-ENTMCNC: 31.2 % — LOW (ref 32–36)
MCV RBC AUTO: 97.5 FL — SIGNIFICANT CHANGE UP (ref 80–100)
MONOCYTES # BLD AUTO: 0.56 K/UL — SIGNIFICANT CHANGE UP (ref 0–0.9)
MONOCYTES NFR BLD AUTO: 10.5 % — SIGNIFICANT CHANGE UP (ref 2–14)
NEUTROPHILS # BLD AUTO: 3.46 K/UL — SIGNIFICANT CHANGE UP (ref 1.8–7.4)
NEUTROPHILS NFR BLD AUTO: 65 % — SIGNIFICANT CHANGE UP (ref 43–77)
NRBC # FLD: 0 — SIGNIFICANT CHANGE UP
PHOSPHATE SERPL-MCNC: 3.5 MG/DL — SIGNIFICANT CHANGE UP (ref 2.5–4.5)
PLATELET # BLD AUTO: 120 K/UL — LOW (ref 150–400)
PMV BLD: 11.5 FL — SIGNIFICANT CHANGE UP (ref 7–13)
POTASSIUM SERPL-MCNC: 4 MMOL/L — SIGNIFICANT CHANGE UP (ref 3.5–5.3)
POTASSIUM SERPL-SCNC: 4 MMOL/L — SIGNIFICANT CHANGE UP (ref 3.5–5.3)
PROTHROM AB SERPL-ACNC: 16.8 SEC — HIGH (ref 9.8–13.1)
RBC # BLD: 3.95 M/UL — LOW (ref 4.2–5.8)
RBC # FLD: 16.3 % — HIGH (ref 10.3–14.5)
SODIUM SERPL-SCNC: 134 MMOL/L — LOW (ref 135–145)
WBC # BLD: 5.32 K/UL — SIGNIFICANT CHANGE UP (ref 3.8–10.5)
WBC # FLD AUTO: 5.32 K/UL — SIGNIFICANT CHANGE UP (ref 3.8–10.5)

## 2018-01-27 PROCEDURE — 99233 SBSQ HOSP IP/OBS HIGH 50: CPT | Mod: GC

## 2018-01-27 PROCEDURE — 71045 X-RAY EXAM CHEST 1 VIEW: CPT | Mod: 26

## 2018-01-27 RX ORDER — WARFARIN SODIUM 2.5 MG/1
2 TABLET ORAL ONCE
Qty: 0 | Refills: 0 | Status: COMPLETED | OUTPATIENT
Start: 2018-01-27 | End: 2018-01-27

## 2018-01-27 RX ORDER — BUDESONIDE AND FORMOTEROL FUMARATE DIHYDRATE 160; 4.5 UG/1; UG/1
2 AEROSOL RESPIRATORY (INHALATION)
Qty: 0 | Refills: 0 | Status: DISCONTINUED | OUTPATIENT
Start: 2018-01-27 | End: 2018-01-31

## 2018-01-27 RX ADMIN — BENZOCAINE AND MENTHOL 1 LOZENGE: 5; 1 LIQUID ORAL at 17:22

## 2018-01-27 RX ADMIN — BUDESONIDE AND FORMOTEROL FUMARATE DIHYDRATE 2 PUFF(S): 160; 4.5 AEROSOL RESPIRATORY (INHALATION) at 23:21

## 2018-01-27 RX ADMIN — WARFARIN SODIUM 2 MILLIGRAM(S): 2.5 TABLET ORAL at 17:22

## 2018-01-27 RX ADMIN — Medication 100 MILLIGRAM(S): at 06:36

## 2018-01-27 RX ADMIN — Medication 3 MILLILITER(S): at 22:22

## 2018-01-27 RX ADMIN — Medication 81 MILLIGRAM(S): at 13:08

## 2018-01-27 RX ADMIN — Medication 10.4 MICROGRAM(S)/KG/MIN: at 07:48

## 2018-01-27 RX ADMIN — Medication 10.4 MICROGRAM(S)/KG/MIN: at 17:23

## 2018-01-27 RX ADMIN — MIDODRINE HYDROCHLORIDE 30 MILLIGRAM(S): 2.5 TABLET ORAL at 23:22

## 2018-01-27 RX ADMIN — MIDODRINE HYDROCHLORIDE 30 MILLIGRAM(S): 2.5 TABLET ORAL at 13:08

## 2018-01-27 RX ADMIN — Medication 3 MILLILITER(S): at 16:31

## 2018-01-27 RX ADMIN — Medication 100 MILLIGRAM(S): at 17:23

## 2018-01-27 RX ADMIN — AMIODARONE HYDROCHLORIDE 200 MILLIGRAM(S): 400 TABLET ORAL at 06:36

## 2018-01-27 RX ADMIN — MIDODRINE HYDROCHLORIDE 30 MILLIGRAM(S): 2.5 TABLET ORAL at 06:36

## 2018-01-27 RX ADMIN — SENNA PLUS 2 TABLET(S): 8.6 TABLET ORAL at 23:22

## 2018-01-27 RX ADMIN — Medication 10.4 MICROGRAM(S)/KG/MIN: at 13:09

## 2018-01-27 RX ADMIN — FINASTERIDE 5 MILLIGRAM(S): 5 TABLET, FILM COATED ORAL at 13:08

## 2018-01-27 RX ADMIN — Medication 75 MICROGRAM(S): at 06:36

## 2018-01-27 RX ADMIN — ATORVASTATIN CALCIUM 80 MILLIGRAM(S): 80 TABLET, FILM COATED ORAL at 23:21

## 2018-01-27 RX ADMIN — Medication 3 MILLILITER(S): at 03:53

## 2018-01-27 RX ADMIN — POLYETHYLENE GLYCOL 3350 17 GRAM(S): 17 POWDER, FOR SOLUTION ORAL at 07:47

## 2018-01-27 NOTE — PROGRESS NOTE ADULT - ASSESSMENT
Imp: End stage cardiomyopathy on Dobutamine and HD.  Patient coughing this morning. Await IM input.  Dose coumadin based on INR today

## 2018-01-27 NOTE — PROGRESS NOTE ADULT - SUBJECTIVE AND OBJECTIVE BOX
Patient is a 62y old  Male who presents with a chief complaint of Diarrhea (25 Jan 2018 18:10)    SUBJECTIVE / OVERNIGHT EVENTS:  Patient endorsed poor improvement of his non-productive cough, generalized weakness and some shortness of breath. Denied fever, chills, nausea, diarrhea, chest pain, palpitations, abdominal pain, focal weakness.   Endorsed constipation.     MEDICATIONS  (STANDING):  ALBUTerol/ipratropium for Nebulization 3 milliLiter(s) Nebulizer every 6 hours  amiodarone    Tablet 200 milliGRAM(s) Oral daily  aspirin enteric coated 81 milliGRAM(s) Oral daily  atorvastatin 80 milliGRAM(s) Oral at bedtime  buDESOnide  80 MICROgram(s)/formoterol 4.5 MICROgram(s) Inhaler 2 Puff(s) Inhalation two times a day  DOBUTamine Infusion 10.019 MICROgram(s)/kG/Min (10.4 mL/Hr) IV Continuous <Continuous>  docusate sodium 100 milliGRAM(s) Oral two times a day  finasteride 5 milliGRAM(s) Oral daily  levothyroxine 75 MICROGram(s) Oral daily  midodrine 30 milliGRAM(s) Oral every 8 hours  senna 2 Tablet(s) Oral at bedtime    MEDICATIONS  (PRN):  benzocaine 15 mG/menthol 3.6 mG Lozenge 1 Lozenge Oral every 6 hours PRN Sore Throat  HYDROcodone/homatropine Syrup 5 milliLiter(s) Oral every 6 hours PRN Cough  polyethylene glycol 3350 17 Gram(s) Oral daily PRN Constipation    Vital Signs Last 24 Hrs  T(C): 36.6 (27 Jan 2018 06:32), Max: 36.6 (27 Jan 2018 06:32)  T(F): 97.9 (27 Jan 2018 06:32), Max: 97.9 (27 Jan 2018 06:32)  HR: 84 (27 Jan 2018 06:32) (65 - 89)  BP: 88/54 (27 Jan 2018 06:32) (88/54 - 106/51)  RR: 18 (27 Jan 2018 06:32) (18 - 18)  SpO2: 96% (27 Jan 2018 06:32) (95% - 99%)  CAPILLARY BLOOD GLUCOSE      I&O's Summary    26 Jan 2018 07:01  -  27 Jan 2018 07:00  --------------------------------------------------------  IN: 1114.4 mL / OUT: 1500 mL / NET: -385.6 mL        PHYSICAL EXAM:  GENERAL: NAD, well-developed  HEAD:  Atraumatic, Normocephalic  EYES: EOMI, PERRLA, conjunctiva and sclera clear  NECK: Supple, No JVD  CHEST/LUNG: Clear to auscultation bilaterally; No wheeze  HEART: Regular rate and rhythm; Normal S1 S2, No murmurs, rubs, or gallops  ABDOMEN: Soft, Nontender, Nondistended; Bowel sounds present  EXTREMITIES:  2+ Peripheral Pulses, No clubbing, cyanosis, or edema  PSYCH: AAOx3  NEUROLOGY: non-focal  SKIN: No rashes or lesions    LABS:             12.0   5.32  )-----------( 120      ( 27 Jan 2018 06:03 )             38.5     01-27  134<L>  |  95<L>  |  30<H>  ----------------------------<  120<H>  4.0   |  24  |  5.00<H>    Ca    8.5      27 Jan 2018 06:03  Phos  3.5     01-27  Mg     1.9     01-27      PT/INR - ( 27 Jan 2018 06:03 )   PT: 16.8 SEC;   INR: 1.45     PTT - ( 26 Jan 2018 06:45 )  PTT:44.9 SEC      RADIOLOGY & ADDITIONAL TESTS: No new imaging.     Imaging Personally Reviewed:    Consultant(s) Notes Reviewed:      Care Discussed with Consultants/Other Providers: Patient is a 62y old  Male who presents with a chief complaint of Diarrhea (25 Jan 2018 18:10)    SUBJECTIVE / OVERNIGHT EVENTS:  Patient endorsed poor improvement of his non-productive cough, generalized weakness and some shortness of breath. Denied fever, chills, nausea, diarrhea, chest pain, palpitations, abdominal pain, focal weakness.   Endorsed constipation.  On telemetry afib, HR 80-90s with occasional PVCs. On NC 4 Liters.     MEDICATIONS  (STANDING):  ALBUTerol/ipratropium for Nebulization 3 milliLiter(s) Nebulizer every 6 hours  amiodarone    Tablet 200 milliGRAM(s) Oral daily  aspirin enteric coated 81 milliGRAM(s) Oral daily  atorvastatin 80 milliGRAM(s) Oral at bedtime  buDESOnide  80 MICROgram(s)/formoterol 4.5 MICROgram(s) Inhaler 2 Puff(s) Inhalation two times a day  DOBUTamine Infusion 10.019 MICROgram(s)/kG/Min (10.4 mL/Hr) IV Continuous <Continuous>  docusate sodium 100 milliGRAM(s) Oral two times a day  finasteride 5 milliGRAM(s) Oral daily  levothyroxine 75 MICROGram(s) Oral daily  midodrine 30 milliGRAM(s) Oral every 8 hours  senna 2 Tablet(s) Oral at bedtime    MEDICATIONS  (PRN):  benzocaine 15 mG/menthol 3.6 mG Lozenge 1 Lozenge Oral every 6 hours PRN Sore Throat  HYDROcodone/homatropine Syrup 5 milliLiter(s) Oral every 6 hours PRN Cough  polyethylene glycol 3350 17 Gram(s) Oral daily PRN Constipation    Vital Signs Last 24 Hrs  T(C): 36.6 (27 Jan 2018 06:32), Max: 36.6 (27 Jan 2018 06:32)  T(F): 97.9 (27 Jan 2018 06:32), Max: 97.9 (27 Jan 2018 06:32)  HR: 84 (27 Jan 2018 06:32) (65 - 89)  BP: 88/54 (27 Jan 2018 06:32) (88/54 - 106/51)  RR: 18 (27 Jan 2018 06:32) (18 - 18)  SpO2: 96% (27 Jan 2018 06:32) (95% - 99%)  CAPILLARY BLOOD GLUCOSE      I&O's Summary    26 Jan 2018 07:01  -  27 Jan 2018 07:00  --------------------------------------------------------  IN: 1114.4 mL / OUT: 1500 mL / NET: -385.6 mL        PHYSICAL EXAM:  GENERAL: NAD, frail   HEAD:  Atraumatic, Normocephalic  EYES: EOMI, conjunctiva and sclera clear  NECK: Supple, No JVD  CHEST/LUNG: Crackles at the bases.   HEART: Irregular rhythm, murmur appreciated.   ABDOMEN: Soft, Nontender, Nondistended; Bowel sounds present  EXTREMITIES:  2+ Peripheral Pulses, No clubbing, cyanosis, or edema  PSYCH: AAOx3  NEUROLOGY: non-focal  SKIN: No rashes or lesions    LABS:             12.0   5.32  )-----------( 120      ( 27 Jan 2018 06:03 )             38.5     01-27  134<L>  |  95<L>  |  30<H>  ----------------------------<  120<H>  4.0   |  24  |  5.00<H>    Ca    8.5      27 Jan 2018 06:03  Phos  3.5     01-27  Mg     1.9     01-27    PT/INR - ( 27 Jan 2018 06:03 )   PT: 16.8 SEC;   INR: 1.45     PTT - ( 26 Jan 2018 06:45 )  PTT:44.9 SEC    RADIOLOGY & ADDITIONAL TESTS: No new imaging.     Imaging Personally Reviewed:    Consultant(s) Notes Reviewed:  Cardiology    Care Discussed with Consultants/Other Providers: Cardiology Patient is a 62y old  Male who presents with a chief complaint of Diarrhea (25 Jan 2018 18:10)    SUBJECTIVE / OVERNIGHT EVENTS:  Patient endorsed poor improvement of his non-productive cough, generalized weakness and some shortness of breath. Denied fever, chills, nausea, diarrhea, chest pain, palpitations, abdominal pain, focal weakness.   Endorsed constipation.  On telemetry afib, HR 80-90s with occasional PVCs. On NC 4 Liters.     MEDICATIONS  (STANDING):  ALBUTerol/ipratropium for Nebulization 3 milliLiter(s) Nebulizer every 6 hours  amiodarone    Tablet 200 milliGRAM(s) Oral daily  aspirin enteric coated 81 milliGRAM(s) Oral daily  atorvastatin 80 milliGRAM(s) Oral at bedtime  buDESOnide  80 MICROgram(s)/formoterol 4.5 MICROgram(s) Inhaler 2 Puff(s) Inhalation two times a day  DOBUTamine Infusion 10.019 MICROgram(s)/kG/Min (10.4 mL/Hr) IV Continuous <Continuous>  docusate sodium 100 milliGRAM(s) Oral two times a day  finasteride 5 milliGRAM(s) Oral daily  levothyroxine 75 MICROGram(s) Oral daily  midodrine 30 milliGRAM(s) Oral every 8 hours  senna 2 Tablet(s) Oral at bedtime    MEDICATIONS  (PRN):  benzocaine 15 mG/menthol 3.6 mG Lozenge 1 Lozenge Oral every 6 hours PRN Sore Throat  HYDROcodone/homatropine Syrup 5 milliLiter(s) Oral every 6 hours PRN Cough  polyethylene glycol 3350 17 Gram(s) Oral daily PRN Constipation    Vital Signs Last 24 Hrs  T(C): 36.6 (27 Jan 2018 06:32), Max: 36.6 (27 Jan 2018 06:32)  T(F): 97.9 (27 Jan 2018 06:32), Max: 97.9 (27 Jan 2018 06:32)  HR: 84 (27 Jan 2018 06:32) (65 - 89)  BP: 88/54 (27 Jan 2018 06:32) (88/54 - 106/51)  RR: 18 (27 Jan 2018 06:32) (18 - 18)  SpO2: 96% (27 Jan 2018 06:32) (95% - 99%)  CAPILLARY BLOOD GLUCOSE      I&O's Summary    26 Jan 2018 07:01  -  27 Jan 2018 07:00  --------------------------------------------------------  IN: 1114.4 mL / OUT: 1500 mL / NET: -385.6 mL        PHYSICAL EXAM:  GENERAL: NAD, frail   HEAD:  Atraumatic, Normocephalic  EYES: EOMI, conjunctiva and sclera clear  NECK: Supple, No JVD  CHEST/LUNG: Crackles at the bases.   HEART: Irregular rhythm, murmur appreciated.   ABDOMEN: Soft, Nontender, Nondistended; Bowel sounds present  EXTREMITIES:  2+ Peripheral Pulses, No clubbing, cyanosis, or edema  PSYCH: AAOx3  NEUROLOGY: non-focal  SKIN: PICC line with no signs of infection.    LABS:             12.0   5.32  )-----------( 120      ( 27 Jan 2018 06:03 )             38.5     01-27  134<L>  |  95<L>  |  30<H>  ----------------------------<  120<H>  4.0   |  24  |  5.00<H>    Ca    8.5      27 Jan 2018 06:03  Phos  3.5     01-27  Mg     1.9     01-27    PT/INR - ( 27 Jan 2018 06:03 )   PT: 16.8 SEC;   INR: 1.45     PTT - ( 26 Jan 2018 06:45 )  PTT:44.9 SEC     Xray Chest 1 View AP- PORTABLE-Urgent (01.23.18 @ 11:43) >  IMPRESSION:  Stable previously seen tunneled right chest wall dual-lumen dialysis   catheter, left chest wall single-lead AICD, cardiomegaly, and scant   aortic arch calcifications.    Fine bilateral interstitial prominence again noted probably reflecting   fibrosis/chronic interstitial disease however component of superimposed   mild edema cannot be entirely excluded. High-resolution CT could be   obtained to further assess as warranted. No focal patchy airspace   consolidations. No pneumothorax.    Hazy indistinct bilateral CP angles could be due in part to overlying   soft tissues versus small pleural reactions.    Trachea midline.    Generalized osteopenia.      RADIOLOGY & ADDITIONAL TESTS: Chest X-ray- with diffuse hazinesss     Imaging Personally Reviewed: Yes    Consultant(s) Notes Reviewed:  Cardiology    Care Discussed with Consultants/Other Providers: Cardiology

## 2018-01-27 NOTE — PROGRESS NOTE ADULT - PROBLEM SELECTOR PLAN 1
- on NC 4L, goal of sat >92%   -  Symptomatic treatment with duonebs q6 hr  -  Isolation Contact precautions   - CTM closely resp status - on NC 4L, goal of sat >92%   -  Symptomatic treatment with duonebs q6 hr, start home budesonide and formoterol   - Hycodan for cough   -  Isolation Contact precautions   - CTM closely resp status

## 2018-01-27 NOTE — PROGRESS NOTE ADULT - PROBLEM SELECTOR PLAN 6
- patient with persistent afib on coumadin.  - INR 1.45, continue home dose coumadin 2mg.   - c/w amiodarone 200mg PO daily  - Monitor on telemetry, atrial flutter HR 80s

## 2018-01-27 NOTE — PROGRESS NOTE ADULT - PROBLEM SELECTOR PLAN 1
Electrolytes acceptable.   plan for next routine HD 1/29  c/w renal diet, fluid restriction, d/w pt  Cont midodrine preHD.

## 2018-01-27 NOTE — PROGRESS NOTE ADULT - ATTENDING COMMENTS
62M ESRD (HD MWF), NICM (EF 21%), ICD, PICC line in place with home dobutamine drip, afib on coumadin, COPD on home O2 (4-5L), hypotension on midodrine (SBP baseline 80s-90s) who presents for diarrhea and hypotension to 70s, human metapneumovirus  continue supportive care for hPMV, bronchodilators.  No further diarrhea, but pt still weak and wheezy.   severe chronic systolic CHF, appreciate cardiology recs--stable on IV inotrope  nephrology following for continuing HD  dose coumadin daily based on INR  SOB likely multifactorial, CHF/pulmonary fibrosis/ hMPV infection  PICC needs to be changed per RN

## 2018-01-27 NOTE — PROGRESS NOTE ADULT - SUBJECTIVE AND OBJECTIVE BOX
No chest pain, dyspnea, abdominal pain or diarrhea. Complaining of a cough    T(F): 97.3 (01-26-18 @ 22:55), Max: 97.8 (01-26-18 @ 06:30)  HR: 84 (01-27-18 @ 03:53) (65 - 89)  BP: 97/58 (01-26-18 @ 22:55) (89/55 - 106/51)  RR: 18 (01-26-18 @ 22:55) (18 - 18)  SpO2: 97% (01-27-18 @ 03:53) (97% - 99%)  Wt(kg): --  ,   I&O's Summary    25 Jan 2018 07:01  -  26 Jan 2018 07:00  --------------------------------------------------------  IN: 732.4 mL / OUT: 0 mL / NET: 732.4 mL    26 Jan 2018 07:01  -  27 Jan 2018 06:22  --------------------------------------------------------  IN: 500 mL / OUT: 1500 mL / NET: -1000 mL        Lungs clear  Irregular rate and rhythm 1/6 systolic murmur  No edema                          12.0   4.82  )-----------( 124      ( 26 Jan 2018 06:45 )             38.6               01-26    132<L>  |  93<L>  |  35<H>  ----------------------------<  117<H>  4.0   |  26  |  5.95<H>    Ca    8.3<L>      26 Jan 2018 06:45  Phos  3.5     01-26  Mg     2.0     01-26      PT/INR - ( 26 Jan 2018 06:45 )   PT: 21.9 SEC;   INR: 1.95          PTT - ( 26 Jan 2018 06:45 )  PTT:44.9 SEC

## 2018-01-27 NOTE — PROGRESS NOTE ADULT - SUBJECTIVE AND OBJECTIVE BOX
Pt seen and examined at bedside    Improved cough and SOB. Denies pain.    Allergies:  No Known Allergies    Hospital Medications:   MEDICATIONS  (STANDING):  ALBUTerol/ipratropium for Nebulization 3 milliLiter(s) Nebulizer every 6 hours  amiodarone    Tablet 200 milliGRAM(s) Oral daily  aspirin enteric coated 81 milliGRAM(s) Oral daily  atorvastatin 80 milliGRAM(s) Oral at bedtime  DOBUTamine Infusion 10.019 MICROgram(s)/kG/Min (10.4 mL/Hr) IV Continuous <Continuous>  docusate sodium 100 milliGRAM(s) Oral two times a day  finasteride 5 milliGRAM(s) Oral daily  levothyroxine 75 MICROGram(s) Oral daily  midodrine 30 milliGRAM(s) Oral every 8 hours  senna 2 Tablet(s) Oral at bedtime  warfarin 2 milliGRAM(s) Oral once      VITALS:  Vital Signs Last 24 Hrs  T(C): 36.3 (27 Jan 2018 13:03), Max: 36.6 (27 Jan 2018 06:32)  T(F): 97.4 (27 Jan 2018 13:03), Max: 97.9 (27 Jan 2018 06:32)  HR: 76 (27 Jan 2018 17:19) (75 - 89)  BP: 89/56 (27 Jan 2018 17:19) (82/50 - 97/58)  BP(mean): --  RR: 18 (27 Jan 2018 17:19) (18 - 20)  SpO2: 98% (27 Jan 2018 17:19) (95% - 100%)    I&O's Summary    26 Jan 2018 07:01  -  27 Jan 2018 07:00  --------------------------------------------------------  IN: 1114.4 mL / OUT: 1500 mL / NET: -385.6 mL    27 Jan 2018 07:01  -  27 Jan 2018 20:07  --------------------------------------------------------  IN: 240 mL / OUT: 0 mL / NET: 240 mL      PHYSICAL EXAM:  Constitutional: NAD  HEENT: anicteric sclera, oropharynx clear, MMM  Neck: No JVD  Respiratory: dec bibailar BS, no wheezes, rales or rhonchi  Cardiovascular: S1, S2, RRR  Gastrointestinal: BS+, soft, NT/ND  Extremities: No cyanosis or clubbing. Trace peripheral edema  Neurological: A/O x 3, no focal deficits  Psychiatric: Normal mood, normal affect  : No CVA tenderness. No rosa.   Skin: No rashes  Vascular Access: RIJ Tunneled cath     LABS:  01-27    134<L>  |  95<L>  |  30<H>  ----------------------------<  120<H>  4.0   |  24  |  5.00<H>    Ca    8.5      27 Jan 2018 06:03  Phos  3.5     01-27  Mg     1.9     01-27                          12.0   5.32  )-----------( 120      ( 27 Jan 2018 06:03 )             38.5     Urine Studies:      RADIOLOGY & ADDITIONAL STUDIES:

## 2018-01-27 NOTE — PROGRESS NOTE ADULT - ASSESSMENT
62y Male with ESRD on HD MWF, NICM with severe LV dysfnxn (EF 21%), s/p biotronic ICD, on home dobutamine drip, Afib on coumadin, COPD on 3-4L home O2, DM, and chronic hypotension on midodrine presented with +RSV. Renal following for ESRD, for HD      ESRD on dialysis, Maintenance HD schedule MWF. electrolytes acceptable. s/p HD 1/26, Rx sheet reviewed, net uf 1kg, tolearted well  HYpotension, chronic, asympt  chronic CHF-stable    labs reviewed

## 2018-01-28 DIAGNOSIS — J84.10 PULMONARY FIBROSIS, UNSPECIFIED: ICD-10-CM

## 2018-01-28 LAB
APTT BLD: 42.5 SEC — HIGH (ref 27.5–37.4)
BASOPHILS # BLD AUTO: 0.02 K/UL — SIGNIFICANT CHANGE UP (ref 0–0.2)
BASOPHILS NFR BLD AUTO: 0.4 % — SIGNIFICANT CHANGE UP (ref 0–2)
BLD GP AB SCN SERPL QL: NEGATIVE — SIGNIFICANT CHANGE UP
BUN SERPL-MCNC: 41 MG/DL — HIGH (ref 7–23)
CALCIUM SERPL-MCNC: 8.5 MG/DL — SIGNIFICANT CHANGE UP (ref 8.4–10.5)
CHLORIDE SERPL-SCNC: 93 MMOL/L — LOW (ref 98–107)
CO2 SERPL-SCNC: 25 MMOL/L — SIGNIFICANT CHANGE UP (ref 22–31)
CREAT SERPL-MCNC: 6.68 MG/DL — HIGH (ref 0.5–1.3)
EOSINOPHIL # BLD AUTO: 0.2 K/UL — SIGNIFICANT CHANGE UP (ref 0–0.5)
EOSINOPHIL NFR BLD AUTO: 4.2 % — SIGNIFICANT CHANGE UP (ref 0–6)
GLUCOSE SERPL-MCNC: 139 MG/DL — HIGH (ref 70–99)
HCT VFR BLD CALC: 37.5 % — LOW (ref 39–50)
HGB BLD-MCNC: 12 G/DL — LOW (ref 13–17)
IMM GRANULOCYTES # BLD AUTO: 0.01 # — SIGNIFICANT CHANGE UP
IMM GRANULOCYTES NFR BLD AUTO: 0.2 % — SIGNIFICANT CHANGE UP (ref 0–1.5)
INR BLD: 1.4 — HIGH (ref 0.88–1.17)
LYMPHOCYTES # BLD AUTO: 1 K/UL — SIGNIFICANT CHANGE UP (ref 1–3.3)
LYMPHOCYTES # BLD AUTO: 21.1 % — SIGNIFICANT CHANGE UP (ref 13–44)
MAGNESIUM SERPL-MCNC: 2.1 MG/DL — SIGNIFICANT CHANGE UP (ref 1.6–2.6)
MCHC RBC-ENTMCNC: 31.2 PG — SIGNIFICANT CHANGE UP (ref 27–34)
MCHC RBC-ENTMCNC: 32 % — SIGNIFICANT CHANGE UP (ref 32–36)
MCV RBC AUTO: 97.4 FL — SIGNIFICANT CHANGE UP (ref 80–100)
MONOCYTES # BLD AUTO: 0.54 K/UL — SIGNIFICANT CHANGE UP (ref 0–0.9)
MONOCYTES NFR BLD AUTO: 11.4 % — SIGNIFICANT CHANGE UP (ref 2–14)
NEUTROPHILS # BLD AUTO: 2.98 K/UL — SIGNIFICANT CHANGE UP (ref 1.8–7.4)
NEUTROPHILS NFR BLD AUTO: 62.7 % — SIGNIFICANT CHANGE UP (ref 43–77)
NRBC # FLD: 0 — SIGNIFICANT CHANGE UP
PHOSPHATE SERPL-MCNC: 4.6 MG/DL — HIGH (ref 2.5–4.5)
PLATELET # BLD AUTO: 107 K/UL — LOW (ref 150–400)
PMV BLD: 12.1 FL — SIGNIFICANT CHANGE UP (ref 7–13)
POTASSIUM SERPL-MCNC: 4.3 MMOL/L — SIGNIFICANT CHANGE UP (ref 3.5–5.3)
POTASSIUM SERPL-SCNC: 4.3 MMOL/L — SIGNIFICANT CHANGE UP (ref 3.5–5.3)
PROTHROM AB SERPL-ACNC: 15.7 SEC — HIGH (ref 9.8–13.1)
RBC # BLD: 3.85 M/UL — LOW (ref 4.2–5.8)
RBC # FLD: 16.5 % — HIGH (ref 10.3–14.5)
RH IG SCN BLD-IMP: POSITIVE — SIGNIFICANT CHANGE UP
SODIUM SERPL-SCNC: 132 MMOL/L — LOW (ref 135–145)
WBC # BLD: 4.75 K/UL — SIGNIFICANT CHANGE UP (ref 3.8–10.5)
WBC # FLD AUTO: 4.75 K/UL — SIGNIFICANT CHANGE UP (ref 3.8–10.5)

## 2018-01-28 PROCEDURE — 99233 SBSQ HOSP IP/OBS HIGH 50: CPT | Mod: GC

## 2018-01-28 RX ORDER — WARFARIN SODIUM 2.5 MG/1
3 TABLET ORAL ONCE
Qty: 0 | Refills: 0 | Status: COMPLETED | OUTPATIENT
Start: 2018-01-28 | End: 2018-01-28

## 2018-01-28 RX ORDER — AMIODARONE HYDROCHLORIDE 400 MG/1
100 TABLET ORAL DAILY
Qty: 0 | Refills: 0 | Status: DISCONTINUED | OUTPATIENT
Start: 2018-01-28 | End: 2018-01-31

## 2018-01-28 RX ADMIN — FINASTERIDE 5 MILLIGRAM(S): 5 TABLET, FILM COATED ORAL at 12:15

## 2018-01-28 RX ADMIN — BUDESONIDE AND FORMOTEROL FUMARATE DIHYDRATE 2 PUFF(S): 160; 4.5 AEROSOL RESPIRATORY (INHALATION) at 08:08

## 2018-01-28 RX ADMIN — Medication 3 MILLILITER(S): at 04:28

## 2018-01-28 RX ADMIN — ATORVASTATIN CALCIUM 80 MILLIGRAM(S): 80 TABLET, FILM COATED ORAL at 21:07

## 2018-01-28 RX ADMIN — MIDODRINE HYDROCHLORIDE 30 MILLIGRAM(S): 2.5 TABLET ORAL at 21:07

## 2018-01-28 RX ADMIN — Medication 3 MILLILITER(S): at 15:11

## 2018-01-28 RX ADMIN — AMIODARONE HYDROCHLORIDE 100 MILLIGRAM(S): 400 TABLET ORAL at 13:19

## 2018-01-28 RX ADMIN — WARFARIN SODIUM 3 MILLIGRAM(S): 2.5 TABLET ORAL at 17:17

## 2018-01-28 RX ADMIN — Medication 3 MILLILITER(S): at 22:30

## 2018-01-28 RX ADMIN — BUDESONIDE AND FORMOTEROL FUMARATE DIHYDRATE 2 PUFF(S): 160; 4.5 AEROSOL RESPIRATORY (INHALATION) at 21:07

## 2018-01-28 RX ADMIN — Medication 100 MILLIGRAM(S): at 17:17

## 2018-01-28 RX ADMIN — Medication 3 MILLILITER(S): at 10:30

## 2018-01-28 RX ADMIN — Medication 100 MILLIGRAM(S): at 06:51

## 2018-01-28 RX ADMIN — SENNA PLUS 2 TABLET(S): 8.6 TABLET ORAL at 21:07

## 2018-01-28 RX ADMIN — MIDODRINE HYDROCHLORIDE 30 MILLIGRAM(S): 2.5 TABLET ORAL at 12:14

## 2018-01-28 RX ADMIN — MIDODRINE HYDROCHLORIDE 30 MILLIGRAM(S): 2.5 TABLET ORAL at 06:51

## 2018-01-28 RX ADMIN — AMIODARONE HYDROCHLORIDE 200 MILLIGRAM(S): 400 TABLET ORAL at 06:51

## 2018-01-28 RX ADMIN — Medication 75 MICROGRAM(S): at 06:51

## 2018-01-28 RX ADMIN — Medication 81 MILLIGRAM(S): at 12:14

## 2018-01-28 NOTE — CHART NOTE - NSCHARTNOTEFT_GEN_A_CORE
Called by nurse due to systolic blood pressure in the 70s. Went to see patient who was found in no acute distress, sitting comfortably in chair. Denied lightheadedness. No acute events on telemetry. Patient got midodrine dose recently, dobutamine drip running ok. Patient with only one IV acesssm, given PICC line obstructed. Asked nurse to get another IV as a back up, to give fluids or put dobutamine on. Will hold off on giving fluids given his ESRD. RACHAEL Garay  PGY-1 Resident Physician   Pager [phone] or [phone] from 7am to 7pm, after 7pm page night float

## 2018-01-28 NOTE — CONSULT NOTE ADULT - SUBJECTIVE AND OBJECTIVE BOX
Patient is a 62y old  Male who presents with a chief complaint of Diarrhea (25 Jan 2018 18:10)      HPI:  62yM w/pmhx ESRD (HD MWF), NICM (EF 21%), ICD, PICC line in place with home dobutamine drip, afib on coumadin, COPD on home O2 (4-5L), hypotension on midodrine (follows with Dr. Davis) who presents for diarrhea and brief loss of vision. Patient states he got dialysis on jan 22 with no complications, after dialysis he had mild lower back pain and sob which improved by itself. On Jan 23 in the morning, patient felt the need to poop, but did not. About 20 minutes later, he felt the need to go, stood up from his bed, and before reaching the bathroom he soiled himself, watery diarrhea non bloody. At that time his vision blacked out, with no dizziness, lightheadedness, no LOC. Regained vision with rest. Ambulance came, while coming downstairs he had two more watery diarrhea bowel movements. Patient endorsed he has a cough for the past 2 days, but no fever. Patient denied nausea, palpitations, chest pain, abdominal pain, focal weakness. He ambulates with a cane.     In the ED, T97.8, BP 88/50, HR 80, RR 18 saturating 96% on NC 4L. RVP+ HMPV. Midodrine 20mg x1. (23 Jan 2018 17:05)      ?FOLLOWING PRESENT  [ ] Hx of PE/DVT, [ ] Hx COPD, [ ] Hx of Asthma, [ ] Hx of Hospitalization, [ ]  Hx of BiPAP/CPAP use, [ ] Hx of ALONZO    Allergies    No Known Allergies    Intolerances        PAST MEDICAL & SURGICAL HISTORY:  Seizure: Off Keppra since 7/2017  Chronic hypotension  AF (atrial fibrillation)  COPD (chronic obstructive pulmonary disease): 4L home O2  HLD (hyperlipidemia)  DM (diabetes mellitus): Off Insulin since 7/2017  ESRD (end stage renal disease) on dialysis  BPH (benign prostatic hypertrophy)  Myocardial infarction: 10/2011  Gout  CHF (congestive heart failure)  AICD (automatic cardioverter/defibrillator) present: Biotronic - placed 9/11/09  H/O coronary angiogram      FAMILY HISTORY:  No pertinent family history in first degree relatives      Social History: [  ] TOBACCO                  [  ] ETOH                                 [  ] IVDA/DRUGS    REVIEW OF SYSTEMS      General:	    Skin/Breast:  	  Ophthalmologic:  	  ENMT:	    Respiratory and Thorax:  	  Cardiovascular:	    Gastrointestinal:	    Genitourinary:	    Musculoskeletal:	    Neurological:	    Psychiatric:	    Hematology/Lymphatics:	    Endocrine:	    Allergic/Immunologic:	    MEDICATIONS  (STANDING):  ALBUTerol/ipratropium for Nebulization 3 milliLiter(s) Nebulizer every 6 hours  amiodarone    Tablet 100 milliGRAM(s) Oral daily  aspirin enteric coated 81 milliGRAM(s) Oral daily  atorvastatin 80 milliGRAM(s) Oral at bedtime  buDESOnide  80 MICROgram(s)/formoterol 4.5 MICROgram(s) Inhaler 2 Puff(s) Inhalation two times a day  DOBUTamine Infusion 10.019 MICROgram(s)/kG/Min (10.4 mL/Hr) IV Continuous <Continuous>  docusate sodium 100 milliGRAM(s) Oral two times a day  finasteride 5 milliGRAM(s) Oral daily  levothyroxine 75 MICROGram(s) Oral daily  midodrine 30 milliGRAM(s) Oral every 8 hours  senna 2 Tablet(s) Oral at bedtime  warfarin 3 milliGRAM(s) Oral once    MEDICATIONS  (PRN):  benzocaine 15 mG/menthol 3.6 mG Lozenge 1 Lozenge Oral every 6 hours PRN Sore Throat  HYDROcodone/homatropine Syrup 5 milliLiter(s) Oral every 6 hours PRN Cough  polyethylene glycol 3350 17 Gram(s) Oral daily PRN Constipation       Vital Signs Last 24 Hrs  T(C): 36.7 (28 Jan 2018 13:56), Max: 36.9 (27 Jan 2018 23:30)  T(F): 98 (28 Jan 2018 13:56), Max: 98.4 (27 Jan 2018 23:30)  HR: 75 (28 Jan 2018 13:56) (70 - 85)  BP: 74/48 (28 Jan 2018 13:56) (74/48 - 101/56)  BP(mean): --  RR: 18 (28 Jan 2018 13:56) (18 - 22)  SpO2: 90% (28 Jan 2018 13:56) (90% - 100%)        I&O's Summary    27 Jan 2018 07:01  -  28 Jan 2018 07:00  --------------------------------------------------------  IN: 854.4 mL / OUT: 0 mL / NET: 854.4 mL    28 Jan 2018 07:01  -  28 Jan 2018 14:43  --------------------------------------------------------  IN: 220 mL / OUT: 0 mL / NET: 220 mL        Physical Exam:   GENERAL: NAD, well-groomed, well-developed  HEENT: BELL/   Atraumatic, Normocephalic  ENMT: No tonsillar erythema, exudates, or enlargement; Moist mucous membranes, Good dentition, No lesions  NECK: Supple, No JVD, Normal thyroid  CHEST/LUNG: Clear to auscultation bilaterally; No rales, rhonchi, wheezing, or rubs  CVS: Regular rate and rhythm; No murmurs, rubs, or gallops  GI: : Soft, Nontender, Nondistended; Bowel sounds present  NERVOUS SYSTEM:  Alert & Oriented X3, Good concentration; Motor Strength 5/5 B/L upper and lower extremities; DTRs 2+ intact and symmetric  EXTREMITIES:  2+ Peripheral Pulses, No clubbing, cyanosis, or edema  LYMPH: No lymphadenopathy noted  SKIN: No rashes or lesions  ENDOCRINOLOGY: No Thyromegaly  PSYCH: Appropriate    Labs:  2.6<55<4>>32<<7.335>>2.6<<3><<4><<5<<329>>                            12.0   4.75  )-----------( 107      ( 28 Jan 2018 03:53 )             37.5                         12.0   5.32  )-----------( 120      ( 27 Jan 2018 06:03 )             38.5                         12.0   4.82  )-----------( 124      ( 26 Jan 2018 06:45 )             38.6                         12.8   5.13  )-----------( 122      ( 25 Jan 2018 07:10 )             40.3     01-28    132<L>  |  93<L>  |  41<H>  ----------------------------<  139<H>  4.3   |  25  |  6.68<H>  01-27    134<L>  |  95<L>  |  30<H>  ----------------------------<  120<H>  4.0   |  24  |  5.00<H>  01-26    132<L>  |  93<L>  |  35<H>  ----------------------------<  117<H>  4.0   |  26  |  5.95<H>  01-25    138  |  96<L>  |  20  ----------------------------<  137<H>  3.8   |  29  |  4.62<H>    Ca    8.5      28 Jan 2018 03:53  Ca    8.5      27 Jan 2018 06:03  Phos  4.6     01-28  Phos  3.5     01-27  Mg     2.1     01-28  Mg     1.9     01-27      CAPILLARY BLOOD GLUCOSE          PT/INR - ( 28 Jan 2018 03:53 )   PT: 15.7 SEC;   INR: 1.40          PTT - ( 28 Jan 2018 03:53 )  PTT:42.5 SEC    D DImer    Cultures:                       Rapid Respiratory Viral Panel Result        01-23 @ 12:20  Rapid RVP --  Coronovirus --  Adenovirus NOT DETECTED  Bordetella Pertussis NOT DETECTED  Chlamydia Pneumonia NOT DETECTED  Entero/RhinovirusNOT DETECTED  HKU1 Coronovirus --  HMPV Coronovirus POSITIVE  Influenza A NOT DETECTED (any subtype)  Influenza AH1 NOT DETECTED  Influenza AH1 2009 NOT DETECTED  Influenza AH3 NOT DETECTED  Influenza B NOT DETECTED  Mycoplasma Pneumoniae NOT DETECTED This nucleic acid amplification assay was performed using  the [employer]. The following pathogens are tested  for: Adenovirus, Coronavirus 229E, Coronavirus HKU1,  Coronavirus NL63, Coronavirus OC43, Human Metapneumovirus  (HMPV), Rhinovirus/Enterovirus, Influenza A H1, Influenza A  H1 2009 (Pandemic H1 2009), Influenza A H3, Influenza A (Flu  A) subtype not identified, Influenza B, Parainfluenza Virus  (types 1, 2, 3, 4), Respiratory Syncytial Virus (RSV),  Bordetella pertussis, Chlamydophila pneumoniae, and  Mycoplasma pneumoniae. A negative FilmArray result does not  always exclude the possibility of viral or bacterial  infection. Laboratory results should always be interpreted  in the context of clinical findings.  NL63 Coronovirus --  OC43 Coronovirus --  Parainfluenza 1 NOT DETECTED  Parainfluenza 2 NOT DETECTED  Parainfluenza 3 NOT DETECTED  Parainfluenza 4 NOT DETECTED  Resp Syncytial Virus NOT DETECTED        Studies  Chest X-RAY  CT SCAN Chest   CT Abdomen  Venous Dopplers: LE:   Others

## 2018-01-28 NOTE — CHART NOTE - NSCHARTNOTEFT_GEN_A_CORE
Spoke to cardiologist Dr. Roque regarding amiodarone and history of pulmonary fibrosis seen on CT scan from 2017. Stated amiodarone dose could be decreased to 100 mg daily and patient should have PFTs and DLCO as outpatient to evaluate for amiodarone induced pulmonary fibrosis.     Team 3 Senior Resident  Barbara Atwood MD   EM/IM PGY4  pager [phone]

## 2018-01-28 NOTE — CONSULT NOTE ADULT - PROBLEM SELECTOR RECOMMENDATION 2
pt has had extensive pulmonary fibrosis: Per pts sister: genny I had spoken to her in 2017: he did have lung biopsy : but he was not on any specific treatment: He is on 4 L of oxygen at home: per prior notes. Spoke to his wife : he does have pulmonary fibrosis : he did have biopsy in 2016: he was told he has ILD and the treatment they gave her did not work; including prednisone: He iso n oxygen at home: And he does have COPD too: Given h is extensive pulmonary history and extensive pulmonary fibrosis: he is not a good candidate for amiodarone: Not much reserve in him left:

## 2018-01-28 NOTE — PROGRESS NOTE ADULT - PROBLEM SELECTOR PLAN 6
- patient with persistent afib on coumadin.  - INR 1.4, continue home dose coumadin 2mg.   - c/w amiodarone 200mg PO daily  - Monitor on telemetry, atrial flutter HR 70-80s - patient with persistent afib on coumadin.  - INR 1.4, continue home dose coumadin 2mg.   - Cards recs appreciated, c/w amiodarone 100mg PO daily  - Monitor on telemetry, atrial flutter HR 70-80s - patient with persistent afib on coumadin.  - INR 1.4, continue home dose coumadin 3mg.   - Cards recs appreciated, c/w amiodarone 100mg PO daily  - Monitor on telemetry, atrial flutter HR 70-80s

## 2018-01-28 NOTE — PROGRESS NOTE ADULT - SUBJECTIVE AND OBJECTIVE BOX
Patient is a 62y old  Male who presents with a chief complaint of Diarrhea (25 Jan 2018 18:10)    SUBJECTIVE / OVERNIGHT EVENTS:  No acute overnight events. On telemetry patient is afib [id number] with occasional PVCs. Patient endorsed some improvement of his cough and sob. Patient had a normal brown movement yesterday. Patient denied fever, chills, nausea, vomiting, diarrhea, constipation, chest pain, palpitations, abdominal pain, focal weakness.      MEDICATIONS  (STANDING):  ALBUTerol/ipratropium for Nebulization 3 milliLiter(s) Nebulizer every 6 hours  amiodarone    Tablet 200 milliGRAM(s) Oral daily  aspirin enteric coated 81 milliGRAM(s) Oral daily  atorvastatin 80 milliGRAM(s) Oral at bedtime  buDESOnide  80 MICROgram(s)/formoterol 4.5 MICROgram(s) Inhaler 2 Puff(s) Inhalation two times a day  DOBUTamine Infusion 10.019 MICROgram(s)/kG/Min (10.4 mL/Hr) IV Continuous <Continuous>  docusate sodium 100 milliGRAM(s) Oral two times a day  finasteride 5 milliGRAM(s) Oral daily  levothyroxine 75 MICROGram(s) Oral daily  midodrine 30 milliGRAM(s) Oral every 8 hours  senna 2 Tablet(s) Oral at bedtime    MEDICATIONS  (PRN):  benzocaine 15 mG/menthol 3.6 mG Lozenge 1 Lozenge Oral every 6 hours PRN Sore Throat  HYDROcodone/homatropine Syrup 5 milliLiter(s) Oral every 6 hours PRN Cough  polyethylene glycol 3350 17 Gram(s) Oral daily PRN Constipation    Vital Signs Last 24 Hrs  T(C): 36.8 (28 Jan 2018 06:50), Max: 36.9 (27 Jan 2018 23:30)  T(F): 98.2 (28 Jan 2018 06:50), Max: 98.4 (27 Jan 2018 23:30)  HR: 78 (28 Jan 2018 06:50) (72 - 81)  BP: 98/58 (28 Jan 2018 06:50) (82/50 - 101/56)  RR: 18 (28 Jan 2018 06:50) (18 - 22)  SpO2: 98% (28 Jan 2018 06:50) (97% - 100%)  CAPILLARY BLOOD GLUCOSE    I&O's Summary    27 Jan 2018 07:01  -  28 Jan 2018 07:00  --------------------------------------------------------  IN: 854.4 mL / OUT: 0 mL / NET: 854.4 mL      PHYSICAL EXAM:  GEN: Appears in no acute distress  HEENT: PERRLA, EOMI and accommodate, neck supple, no lymphadenopathy, no JVD  MOUTH: moist mucous membranes, no exudates or lesions   PULM: Bibasilar crackles  CV: irregular rhythm  Abdominal: Soft, nontender to palpation, non-distended, normoactive bowel sounds  Extremities: WWP, No edema, + peripheral pulses  NEURO: AAOx3, moving all four extremities spontaneously  Skin: No rashes, lesions, hematomas, ecchymosis.     LABS:             12.0   4.75  )-----------( 107      ( 28 Jan 2018 03:53 )             37.5     01-28  132<L>  |  93<L>  |  41<H>  ----------------------------<  139<H>  4.3   |  25  |  6.68<H>    Ca    8.5      28 Jan 2018 03:53  Phos  4.6     01-28  Mg     2.1     01-28    PT/INR - ( 28 Jan 2018 03:53 )   PT: 15.7 SEC;   INR: 1.40     PTT - ( 28 Jan 2018 03:53 )  PTT:42.5 SEC      RADIOLOGY & ADDITIONAL TESTS: No new imaging.     Imaging Personally Reviewed:    Consultant(s) Notes Reviewed:  Cards     Care Discussed with Consultants/Other Providers: Cards Patient is a 62y old  Male who presents with a chief complaint of Diarrhea (25 Jan 2018 18:10)    SUBJECTIVE / OVERNIGHT EVENTS:  No acute overnight events. On telemetry patient is afib [id number] with occasional PVCs. Patient endorsed some improvement of his cough and sob. Patient had a normal brown movement yesterday. Patient denied fever, chills, nausea, vomiting, diarrhea, constipation, chest pain, palpitations, abdominal pain, focal weakness.  Yesterday, PICC line piece broke off and got lodge inside.     MEDICATIONS  (STANDING):  ALBUTerol/ipratropium for Nebulization 3 milliLiter(s) Nebulizer every 6 hours  amiodarone    Tablet 200 milliGRAM(s) Oral daily  aspirin enteric coated 81 milliGRAM(s) Oral daily  atorvastatin 80 milliGRAM(s) Oral at bedtime  buDESOnide  80 MICROgram(s)/formoterol 4.5 MICROgram(s) Inhaler 2 Puff(s) Inhalation two times a day  DOBUTamine Infusion 10.019 MICROgram(s)/kG/Min (10.4 mL/Hr) IV Continuous <Continuous>  docusate sodium 100 milliGRAM(s) Oral two times a day  finasteride 5 milliGRAM(s) Oral daily  levothyroxine 75 MICROGram(s) Oral daily  midodrine 30 milliGRAM(s) Oral every 8 hours  senna 2 Tablet(s) Oral at bedtime    MEDICATIONS  (PRN):  benzocaine 15 mG/menthol 3.6 mG Lozenge 1 Lozenge Oral every 6 hours PRN Sore Throat  HYDROcodone/homatropine Syrup 5 milliLiter(s) Oral every 6 hours PRN Cough  polyethylene glycol 3350 17 Gram(s) Oral daily PRN Constipation    Vital Signs Last 24 Hrs  T(C): 36.8 (28 Jan 2018 06:50), Max: 36.9 (27 Jan 2018 23:30)  T(F): 98.2 (28 Jan 2018 06:50), Max: 98.4 (27 Jan 2018 23:30)  HR: 78 (28 Jan 2018 06:50) (72 - 81)  BP: 98/58 (28 Jan 2018 06:50) (82/50 - 101/56)  RR: 18 (28 Jan 2018 06:50) (18 - 22)  SpO2: 98% (28 Jan 2018 06:50) (97% - 100%)  CAPILLARY BLOOD GLUCOSE    I&O's Summary    27 Jan 2018 07:01  -  28 Jan 2018 07:00  --------------------------------------------------------  IN: 854.4 mL / OUT: 0 mL / NET: 854.4 mL      PHYSICAL EXAM:  GEN: Appears in no acute distress  HEENT: PERRLA, EOMI and accommodate, neck supple, no lymphadenopathy, no JVD  MOUTH: moist mucous membranes, no exudates or lesions   PULM: Bibasilar crackles  CV: irregular rhythm  Abdominal: Soft, nontender to palpation, non-distended, normoactive bowel sounds  Extremities: WWP, No edema, + peripheral pulses  NEURO: AAOx3, moving all four extremities spontaneously  Skin: No rashes, lesions, hematomas, ecchymosis.     LABS:             12.0   4.75  )-----------( 107      ( 28 Jan 2018 03:53 )             37.5     01-28  132<L>  |  93<L>  |  41<H>  ----------------------------<  139<H>  4.3   |  25  |  6.68<H>    Ca    8.5      28 Jan 2018 03:53  Phos  4.6     01-28  Mg     2.1     01-28    PT/INR - ( 28 Jan 2018 03:53 )   PT: 15.7 SEC;   INR: 1.40     PTT - ( 28 Jan 2018 03:53 )  PTT:42.5 SEC      RADIOLOGY & ADDITIONAL TESTS: No new imaging.     Imaging Personally Reviewed:    Consultant(s) Notes Reviewed:  Cards     Care Discussed with Consultants/Other Providers: Cards Patient is a 62y old  Male who presents with a chief complaint of Diarrhea (25 Jan 2018 18:10)    SUBJECTIVE / OVERNIGHT EVENTS:  No acute overnight events. On telemetry patient is afib [id number] with occasional PVCs. Patient endorsed some improvement of his cough and sob. Patient had a normal brown movement yesterday. Patient denied fever, chills, nausea, vomiting, diarrhea, constipation, chest pain, palpitations, abdominal pain, focal weakness.  Yesterday, IV tubing connector piece of plastic broke off and got lodged inside PICC cap, can no longer use PICC line.     MEDICATIONS  (STANDING):  ALBUTerol/ipratropium for Nebulization 3 milliLiter(s) Nebulizer every 6 hours  amiodarone    Tablet 200 milliGRAM(s) Oral daily  aspirin enteric coated 81 milliGRAM(s) Oral daily  atorvastatin 80 milliGRAM(s) Oral at bedtime  buDESOnide  80 MICROgram(s)/formoterol 4.5 MICROgram(s) Inhaler 2 Puff(s) Inhalation two times a day  DOBUTamine Infusion 10.019 MICROgram(s)/kG/Min (10.4 mL/Hr) IV Continuous <Continuous>  docusate sodium 100 milliGRAM(s) Oral two times a day  finasteride 5 milliGRAM(s) Oral daily  levothyroxine 75 MICROGram(s) Oral daily  midodrine 30 milliGRAM(s) Oral every 8 hours  senna 2 Tablet(s) Oral at bedtime    MEDICATIONS  (PRN):  benzocaine 15 mG/menthol 3.6 mG Lozenge 1 Lozenge Oral every 6 hours PRN Sore Throat  HYDROcodone/homatropine Syrup 5 milliLiter(s) Oral every 6 hours PRN Cough  polyethylene glycol 3350 17 Gram(s) Oral daily PRN Constipation    Vital Signs Last 24 Hrs  T(C): 36.8 (28 Jan 2018 06:50), Max: 36.9 (27 Jan 2018 23:30)  T(F): 98.2 (28 Jan 2018 06:50), Max: 98.4 (27 Jan 2018 23:30)  HR: 78 (28 Jan 2018 06:50) (72 - 81)  BP: 98/58 (28 Jan 2018 06:50) (82/50 - 101/56)  RR: 18 (28 Jan 2018 06:50) (18 - 22)  SpO2: 98% (28 Jan 2018 06:50) (97% - 100%)  CAPILLARY BLOOD GLUCOSE    I&O's Summary    27 Jan 2018 07:01  -  28 Jan 2018 07:00  --------------------------------------------------------  IN: 854.4 mL / OUT: 0 mL / NET: 854.4 mL      PHYSICAL EXAM:  GEN: Appears in no acute distress  HEENT: PERRLA, EOMI and accommodate, neck supple, no lymphadenopathy, no JVD  MOUTH: moist mucous membranes, no exudates or lesions   PULM: Bibasilar crackles  CV: irregular rhythm  Abdominal: Soft, nontender to palpation, non-distended, normoactive bowel sounds  Extremities: WWP, No edema, + peripheral pulses  NEURO: AAOx3, moving all four extremities spontaneously  Skin: No rashes, lesions, hematomas, ecchymosis.     LABS:             12.0   4.75  )-----------( 107      ( 28 Jan 2018 03:53 )             37.5     01-28  132<L>  |  93<L>  |  41<H>  ----------------------------<  139<H>  4.3   |  25  |  6.68<H>    Ca    8.5      28 Jan 2018 03:53  Phos  4.6     01-28  Mg     2.1     01-28    PT/INR - ( 28 Jan 2018 03:53 )   PT: 15.7 SEC;   INR: 1.40     PTT - ( 28 Jan 2018 03:53 )  PTT:42.5 SEC      RADIOLOGY & ADDITIONAL TESTS: No new imaging.     Imaging Personally Reviewed:    Consultant(s) Notes Reviewed:  Cards     Care Discussed with Consultants/Other Providers: Cards

## 2018-01-28 NOTE — PROGRESS NOTE ADULT - ASSESSMENT
Imp: End stage cardiomyopathy on Dobutamine and HD.  Patient will have removal and replacement of PICC line tomorrow in IR then dialysis  Dose coumadin based on INR today  The patient is stable from a cardiovascular perspective. Imp: End stage cardiomyopathy on Dobutamine and HD.  Patient will have removal and replacement of PICC line tomorrow in IR then dialysis  Dose coumadin based on INR today  Decrease amiodarone to 100 mg qd for pulm fibrosis found on CT scan from a previous admission.  When improved, obtain PFT's.  Might need to stop amiodarone.  The patient is stable from a cardiovascular perspective.

## 2018-01-28 NOTE — PROGRESS NOTE ADULT - ATTENDING COMMENTS
62M ESRD (HD MWF), NICM (EF 21%), ICD, PICC line in place with home dobutamine drip, afib on coumadin, on home O2 (4-5L), hypotension on midodrine (SBP baseline 80s-90s) who presents for diarrhea and hypotension to 70s, human metapneumovirus  continue supportive care for hPMV, bronchodilators.  No further diarrhea.   End stage of chronic systolic CHF, appreciate cardiology recs--stable on IV inotrope  nephrology following for continuing HD  dose coumadin daily based on INR  SOB likely multifactorial, CHF/pulmonary fibrosis/ hMPV infection, f/u card / pulm regarding amio   PICC needs to be changed on Mon  Pt reports he is doing fine. sitting in the chair comfortably. d/w pt's brother and other family members at bedside.

## 2018-01-28 NOTE — PROGRESS NOTE ADULT - PROBLEM SELECTOR PLAN 1
- on NC 4L, goal of sat >92%   -  Symptomatic treatment with duonebs q6 hr, c/w home budesonide and formoterol   - Hycodan for cough   -  Isolation Contact precautions   - CTM closely resp status

## 2018-01-28 NOTE — PROGRESS NOTE ADULT - PROBLEM SELECTOR PLAN 5
- Patient on 4 liters of oxygen at home. No wheezes on exam. Bibasilar crackles.  - c/w  budosenide -formoterol, Duonebs q6 hr  - Pulmonary fibrosis seen on last CT scan, could be side effect of amiodarone. - Patient on 4 liters of oxygen at home. No wheezes on exam. Bibasilar crackles.  - c/w  budosenide -formoterol, Duonebs q6 hr  - Pulmonary fibrosis seen on last CT scan, could be side effect of amiodarone, will decrease amio dose to 100mg as per cards.  - May need PFT and DLCO as an outpatient. - Patient on 4 liters of oxygen at home. No wheezes on exam. Bibasilar crackles.  - c/w  budosenide -formoterol, Duonebs q6 hr  - Pulmonary fibrosis seen on last CT scan, could be side effect of amiodarone, will decrease amio dose to 100mg as per cards.  - Should get PFT and DLCO as an outpatient to eval pulmonary fibrosis

## 2018-01-28 NOTE — PROGRESS NOTE ADULT - SUBJECTIVE AND OBJECTIVE BOX
The patient denies chest pain, shortness of breath, arm pain or jaw pain, dizziness or palpitations.      T(F): 98.2 (01-28-18 @ 06:50), Max: 98.4 (01-27-18 @ 23:30)  HR: 78 (01-28-18 @ 06:50) (72 - 81)  BP: 98/58 (01-28-18 @ 06:50) (82/50 - 101/56)  RR: 18 (01-28-18 @ 06:50) (18 - 22)  SpO2: 98% (01-28-18 @ 06:50) (97% - 100%)  Wt(kg): --  ,   I&O's Summary    27 Jan 2018 07:01  -  28 Jan 2018 07:00  --------------------------------------------------------  IN: 854.4 mL / OUT: 0 mL / NET: 854.4 mL        Physical Exam:    Lungs clear  Irregular rate and rhythm 1/6 systolic murmur  No edema                                         12.0   4.75  )-----------( 107      ( 28 Jan 2018 03:53 )             37.5               01-28    132<L>  |  93<L>  |  41<H>  ----------------------------<  139<H>  4.3   |  25  |  6.68<H>    Ca    8.5      28 Jan 2018 03:53  Phos  4.6     01-28  Mg     2.1     01-28      PT/INR - ( 28 Jan 2018 03:53 )   PT: 15.7 SEC;   INR: 1.40          PTT - ( 28 Jan 2018 03:53 )  PTT:42.5 SEC The patient denies chest pain, shortness of breath, arm pain or jaw pain, dizziness or palpitations.      T(F): 98.2 (01-28-18 @ 06:50), Max: 98.4 (01-27-18 @ 23:30)  HR: 78 (01-28-18 @ 06:50) (72 - 81)  BP: 98/58 (01-28-18 @ 06:50) (82/50 - 101/56)  RR: 18 (01-28-18 @ 06:50) (18 - 22)  SpO2: 98% (01-28-18 @ 06:50) (97% - 100%)  Wt(kg): --  ,   I&O's Summary    27 Jan 2018 07:01  -  28 Jan 2018 07:00  --------------------------------------------------------  IN: 854.4 mL / OUT: 0 mL / NET: 854.4 mL        Physical Exam:    Lungs clear  Irregular rate and rhythm 1/6 systolic murmur  No edema      Rhythm: Atrial fibrillation with occasional PVC's                                   12.0   4.75  )-----------( 107      ( 28 Jan 2018 03:53 )             37.5               01-28    132<L>  |  93<L>  |  41<H>  ----------------------------<  139<H>  4.3   |  25  |  6.68<H>    Ca    8.5      28 Jan 2018 03:53  Phos  4.6     01-28  Mg     2.1     01-28      PT/INR - ( 28 Jan 2018 03:53 )   PT: 15.7 SEC;   INR: 1.40          PTT - ( 28 Jan 2018 03:53 )  PTT:42.5 SEC

## 2018-01-28 NOTE — PROGRESS NOTE ADULT - PROBLEM SELECTOR PLAN 4
- patient with EF less than 10% as per Dr. Davis (cardiologist), further recs appreciated.   - Patient on fixed dose dobutamine gtt at home, patient rate currently 10.4 mL/hr of concentration 1000 mg/250 mL, which is 10 mcg/kg/min based on patient's current weight. Per cardiology, continue current rate.   - PICC line malfunction, new picc line placement likely on monday. - patient with EF less than 10% as per Dr. Davis (cardiologist), further recs appreciated.   - Patient on fixed dose dobutamine gtt at home, patient rate currently 10.4 mL/hr of concentration 1000 mg/250 mL, which is 10 mcg/kg/min based on patient's current weight. Per cardiology, continue current rate.   - PICC line piece broke off and got lodge inside, new picc line placement likely on monday. - patient with EF less than 10% as per Dr. Davis (cardiologist), further recs appreciated.   - Patient on fixed dose dobutamine gtt at home, patient rate currently 10.4 mL/hr of concentration 1000 mg/250 mL, which is 10 mcg/kg/min based on patient's current weight. Per cardiology, continue current rate.   - PICC line with piece of IV connector stuck in cap, new picc line placement likely on monday.

## 2018-01-28 NOTE — PROGRESS NOTE ADULT - ASSESSMENT
62yM w/pmhx ESRD (HD MWF), NICM (EF 21%), ICD, PICC line in place with home dobutamine drip, afib on coumadin, COPD on home O2 (4-5L), hypotension on midodrine (SBP baseline 80s-90s) (follows with Dr. Davis) who presents for diarrhea and hypotension to 70s, found to have human metapneumovirus. Patient with 3 watery diarrhea bowel movement at home. Held his stool softners.  Has not had diarrhea in the hospital. Patient with supra-therapeutic INR likely due to diarrhea. 62yM w/pmhx ESRD (HD MWF), NICM (EF 21%), ICD, PICC line in place with home dobutamine drip, afib on coumadin, COPD on home O2 (4-5L), hypotension on midodrine (SBP baseline 80s-90s) (follows with Dr. Davis) who presents for diarrhea and hypotension to 70s, found to have human metapneumovirus. Patient with 3 watery diarrhea bowel movement at home. Held his stool softners.  Has not had diarrhea in the hospital. Patient with supra-therapeutic INR on admission likely due to diarrhea, now subtherapeutic.

## 2018-01-29 LAB
APTT BLD: 46.2 SEC — HIGH (ref 27.5–37.4)
BASOPHILS # BLD AUTO: 0.02 K/UL — SIGNIFICANT CHANGE UP (ref 0–0.2)
BASOPHILS NFR BLD AUTO: 0.4 % — SIGNIFICANT CHANGE UP (ref 0–2)
BUN SERPL-MCNC: 59 MG/DL — HIGH (ref 7–23)
CALCIUM SERPL-MCNC: 8.3 MG/DL — LOW (ref 8.4–10.5)
CHLORIDE SERPL-SCNC: 95 MMOL/L — LOW (ref 98–107)
CO2 SERPL-SCNC: 24 MMOL/L — SIGNIFICANT CHANGE UP (ref 22–31)
CREAT SERPL-MCNC: 8.62 MG/DL — HIGH (ref 0.5–1.3)
EOSINOPHIL # BLD AUTO: 0.16 K/UL — SIGNIFICANT CHANGE UP (ref 0–0.5)
EOSINOPHIL NFR BLD AUTO: 3 % — SIGNIFICANT CHANGE UP (ref 0–6)
GLUCOSE SERPL-MCNC: 123 MG/DL — HIGH (ref 70–99)
HCT VFR BLD CALC: 36.2 % — LOW (ref 39–50)
HGB BLD-MCNC: 11.8 G/DL — LOW (ref 13–17)
IMM GRANULOCYTES # BLD AUTO: 0.02 # — SIGNIFICANT CHANGE UP
IMM GRANULOCYTES NFR BLD AUTO: 0.4 % — SIGNIFICANT CHANGE UP (ref 0–1.5)
INR BLD: 1.53 — HIGH (ref 0.88–1.17)
LYMPHOCYTES # BLD AUTO: 0.77 K/UL — LOW (ref 1–3.3)
LYMPHOCYTES # BLD AUTO: 14.6 % — SIGNIFICANT CHANGE UP (ref 13–44)
MAGNESIUM SERPL-MCNC: 2.1 MG/DL — SIGNIFICANT CHANGE UP (ref 1.6–2.6)
MCHC RBC-ENTMCNC: 31.4 PG — SIGNIFICANT CHANGE UP (ref 27–34)
MCHC RBC-ENTMCNC: 32.6 % — SIGNIFICANT CHANGE UP (ref 32–36)
MCV RBC AUTO: 96.3 FL — SIGNIFICANT CHANGE UP (ref 80–100)
MONOCYTES # BLD AUTO: 0.61 K/UL — SIGNIFICANT CHANGE UP (ref 0–0.9)
MONOCYTES NFR BLD AUTO: 11.5 % — SIGNIFICANT CHANGE UP (ref 2–14)
NEUTROPHILS # BLD AUTO: 3.71 K/UL — SIGNIFICANT CHANGE UP (ref 1.8–7.4)
NEUTROPHILS NFR BLD AUTO: 70.1 % — SIGNIFICANT CHANGE UP (ref 43–77)
NRBC # FLD: 0 — SIGNIFICANT CHANGE UP
PHOSPHATE SERPL-MCNC: 5.9 MG/DL — HIGH (ref 2.5–4.5)
PLATELET # BLD AUTO: 109 K/UL — LOW (ref 150–400)
PMV BLD: 12.1 FL — SIGNIFICANT CHANGE UP (ref 7–13)
POTASSIUM SERPL-MCNC: 4.9 MMOL/L — SIGNIFICANT CHANGE UP (ref 3.5–5.3)
POTASSIUM SERPL-SCNC: 4.9 MMOL/L — SIGNIFICANT CHANGE UP (ref 3.5–5.3)
PROTHROM AB SERPL-ACNC: 17.8 SEC — HIGH (ref 9.8–13.1)
RBC # BLD: 3.76 M/UL — LOW (ref 4.2–5.8)
RBC # FLD: 16 % — HIGH (ref 10.3–14.5)
SODIUM SERPL-SCNC: 135 MMOL/L — SIGNIFICANT CHANGE UP (ref 135–145)
WBC # BLD: 5.29 K/UL — SIGNIFICANT CHANGE UP (ref 3.8–10.5)
WBC # FLD AUTO: 5.29 K/UL — SIGNIFICANT CHANGE UP (ref 3.8–10.5)

## 2018-01-29 PROCEDURE — 99233 SBSQ HOSP IP/OBS HIGH 50: CPT | Mod: GC

## 2018-01-29 RX ORDER — SODIUM CHLORIDE 9 MG/ML
20 INJECTION INTRAMUSCULAR; INTRAVENOUS; SUBCUTANEOUS ONCE
Qty: 0 | Refills: 0 | Status: DISCONTINUED | OUTPATIENT
Start: 2018-01-29 | End: 2018-01-31

## 2018-01-29 RX ORDER — SODIUM CHLORIDE 9 MG/ML
10 INJECTION INTRAMUSCULAR; INTRAVENOUS; SUBCUTANEOUS
Qty: 0 | Refills: 0 | Status: DISCONTINUED | OUTPATIENT
Start: 2018-01-29 | End: 2018-01-31

## 2018-01-29 RX ORDER — WARFARIN SODIUM 2.5 MG/1
3 TABLET ORAL ONCE
Qty: 0 | Refills: 0 | Status: COMPLETED | OUTPATIENT
Start: 2018-01-29 | End: 2018-01-29

## 2018-01-29 RX ADMIN — WARFARIN SODIUM 3 MILLIGRAM(S): 2.5 TABLET ORAL at 18:19

## 2018-01-29 RX ADMIN — ATORVASTATIN CALCIUM 80 MILLIGRAM(S): 80 TABLET, FILM COATED ORAL at 22:27

## 2018-01-29 RX ADMIN — Medication 81 MILLIGRAM(S): at 18:17

## 2018-01-29 RX ADMIN — AMIODARONE HYDROCHLORIDE 100 MILLIGRAM(S): 400 TABLET ORAL at 06:17

## 2018-01-29 RX ADMIN — Medication 3 MILLILITER(S): at 21:35

## 2018-01-29 RX ADMIN — MIDODRINE HYDROCHLORIDE 30 MILLIGRAM(S): 2.5 TABLET ORAL at 18:17

## 2018-01-29 RX ADMIN — Medication 3 MILLILITER(S): at 03:37

## 2018-01-29 RX ADMIN — Medication 3 MILLILITER(S): at 16:55

## 2018-01-29 RX ADMIN — BUDESONIDE AND FORMOTEROL FUMARATE DIHYDRATE 2 PUFF(S): 160; 4.5 AEROSOL RESPIRATORY (INHALATION) at 10:20

## 2018-01-29 RX ADMIN — MIDODRINE HYDROCHLORIDE 30 MILLIGRAM(S): 2.5 TABLET ORAL at 06:17

## 2018-01-29 RX ADMIN — BUDESONIDE AND FORMOTEROL FUMARATE DIHYDRATE 2 PUFF(S): 160; 4.5 AEROSOL RESPIRATORY (INHALATION) at 22:27

## 2018-01-29 RX ADMIN — Medication 100 MILLIGRAM(S): at 06:17

## 2018-01-29 RX ADMIN — Medication 75 MICROGRAM(S): at 06:17

## 2018-01-29 RX ADMIN — FINASTERIDE 5 MILLIGRAM(S): 5 TABLET, FILM COATED ORAL at 18:17

## 2018-01-29 RX ADMIN — Medication 100 MILLIGRAM(S): at 18:17

## 2018-01-29 NOTE — PROGRESS NOTE ADULT - ATTENDING COMMENTS
Patient seen and examined by me during HD session. Patient complaining of cough. He is otherwise tolerating HD.     Patient is a 63 yo man with medical hx significant for ESRD (HD MWF), end stage chronic systolic HF due to NICM (EF 21%) s/p ICD on home dobutamine infusion via PICC, afib on coumadin, ILD on home O2 (4-5L) admitted for hypotension, found to have human metapneumovirus and malfunctioning PICC.    - new PICC placement today via IR  - titrate coumadin accordingly for goal INR 2.0-3.0  - f/u pulm recs.  - f/u with cardiology re: stopping amiodarone given poor lung function, as per pulm recs.  - HD as per renal  - supportive care for cough.    DISPO: Anticipate d/c home tomorrow pending PICC placement. Will f/u with  re: pt's candidacy for additional home services.

## 2018-01-29 NOTE — PROGRESS NOTE ADULT - PROBLEM SELECTOR PLAN 6
- patient with persistent afib on coumadin.  - INR 1.4, continue home dose coumadin 3mg.   - Cards recs appreciated, c/w amiodarone 100mg PO daily  - Monitor on telemetry, atrial flutter HR 70-80s

## 2018-01-29 NOTE — PROGRESS NOTE ADULT - PROBLEM SELECTOR PLAN 4
- patient with EF less than 10% as per Dr. Davis (cardiologist), further recs appreciated.   - Patient on fixed dose dobutamine gtt at home, patient rate currently 10.4 mL/hr of concentration 1000 mg/250 mL, which is 10 mcg/kg/min based on patient's current weight. Per cardiology, continue current rate.   - PICC line with piece of IV connector stuck in cap, new picc line placement likely on monday.

## 2018-01-29 NOTE — PROGRESS NOTE ADULT - SUBJECTIVE AND OBJECTIVE BOX
Pt seen and examined on HD today    No complaints today. Improved cough and SOB. Denies dizzyness/cp    Allergies:  No Known Allergies    Hospital Medications:   MEDICATIONS  (STANDING):  ALBUTerol/ipratropium for Nebulization 3 milliLiter(s) Nebulizer every 6 hours  amiodarone    Tablet 200 milliGRAM(s) Oral daily  aspirin enteric coated 81 milliGRAM(s) Oral daily  atorvastatin 80 milliGRAM(s) Oral at bedtime  DOBUTamine Infusion 10.019 MICROgram(s)/kG/Min (10.4 mL/Hr) IV Continuous <Continuous>  docusate sodium 100 milliGRAM(s) Oral two times a day  finasteride 5 milliGRAM(s) Oral daily  levothyroxine 75 MICROGram(s) Oral daily  midodrine 30 milliGRAM(s) Oral every 8 hours  senna 2 Tablet(s) Oral at bedtime  warfarin 2 milliGRAM(s) Oral once      VITALS:  Vital Signs Last 24 Hrs  T(C): 36.6 (29 Jan 2018 10:45), Max: 36.7 (28 Jan 2018 13:56)  T(F): 97.8 (29 Jan 2018 10:45), Max: 98 (28 Jan 2018 13:56)  HR: 73 (29 Jan 2018 10:45) (70 - 89)  BP: 88/53 (29 Jan 2018 10:45) (74/48 - 111/64)  BP(mean): --  RR: 18 (29 Jan 2018 10:45) (18 - 20)  SpO2: 98% (29 Jan 2018 06:29) (90% - 98%)      PHYSICAL EXAM:  Constitutional: NAD  HEENT: anicteric sclera, oropharynx clear, MMM  Neck: No JVD  Respiratory: dec bibailar BS, no wheezes, rales or rhonchi  Cardiovascular: S1, S2, RRR  Gastrointestinal: BS+, soft, NT/ND  Extremities: No cyanosis or clubbing. Trace peripheral edema  Neurological: A/O x 3, no focal deficits  Psychiatric: Normal mood, normal affect  : No CVA tenderness. No rosa.   Skin: No rashes  Vascular Access: [facility] Tunneled cath     LABS:  01-29    135  |  95<L>  |  59<H>  ----------------------------<  123<H>  4.9   |  24  |  8.62<H>    Ca    8.3<L>      29 Jan 2018 09:23  Phos  5.9     01-29  Mg     2.1     01-29                          11.8   5.29  )-----------( 109      ( 29 Jan 2018 09:23 )             36.2     Urine Studies:      RADIOLOGY & ADDITIONAL STUDIES:

## 2018-01-29 NOTE — PROGRESS NOTE ADULT - SUBJECTIVE AND OBJECTIVE BOX
Patient is a 62y old  Male who presents with a chief complaint of Diarrhea (25 Jan 2018 18:10)      Any change in ROS:   I am having cough   cough is usually dry  no SOB at rest:   MEDICATIONS  (STANDING):  ALBUTerol/ipratropium for Nebulization 3 milliLiter(s) Nebulizer every 6 hours  amiodarone    Tablet 100 milliGRAM(s) Oral daily  aspirin enteric coated 81 milliGRAM(s) Oral daily  atorvastatin 80 milliGRAM(s) Oral at bedtime  buDESOnide  80 MICROgram(s)/formoterol 4.5 MICROgram(s) Inhaler 2 Puff(s) Inhalation two times a day  DOBUTamine Infusion 10.019 MICROgram(s)/kG/Min (10.4 mL/Hr) IV Continuous <Continuous>  docusate sodium 100 milliGRAM(s) Oral two times a day  finasteride 5 milliGRAM(s) Oral daily  levothyroxine 75 MICROGram(s) Oral daily  midodrine 30 milliGRAM(s) Oral every 8 hours  senna 2 Tablet(s) Oral at bedtime    MEDICATIONS  (PRN):  benzocaine 15 mG/menthol 3.6 mG Lozenge 1 Lozenge Oral every 6 hours PRN Sore Throat  HYDROcodone/homatropine Syrup 5 milliLiter(s) Oral every 6 hours PRN Cough  polyethylene glycol 3350 17 Gram(s) Oral daily PRN Constipation    Vital Signs Last 24 Hrs  T(C): 36.6 (29 Jan 2018 10:45), Max: 36.7 (28 Jan 2018 13:56)  T(F): 97.8 (29 Jan 2018 10:45), Max: 98 (28 Jan 2018 13:56)  HR: 73 (29 Jan 2018 10:45) (70 - 89)  BP: 88/53 (29 Jan 2018 10:45) (74/48 - 111/64)  BP(mean): --  RR: 18 (29 Jan 2018 10:45) (18 - 20)  SpO2: 98% (29 Jan 2018 06:29) (90% - 98%)    I&O's Summary    28 Jan 2018 07:01  -  29 Jan 2018 07:00  --------------------------------------------------------  IN: 1144.8 mL / OUT: 0 mL / NET: 1144.8 mL          Physical Exam:   GENERAL: NAD, well-groomed, well-developed  HEENT: BELL/   Atraumatic, Normocephalic  ENMT: No tonsillar erythema, exudates, or enlargement; Moist mucous membranes, Good dentition, No lesions  NECK: Supple, No JVD, Normal thyroid  CHEST/LUNG: Crackles bilateraly+  CVS: Regular rate and rhythm; No murmurs, rubs, or gallops  GI: : Soft, Nontender, Nondistended; Bowel sounds present  NERVOUS SYSTEM:  Alert & Oriented X3  EXTREMITIES:  2+ Peripheral Pulses, No clubbing, cyanosis, or edema  LYMPH: No lymphadenopathy noted  SKIN: No rashes or lesions  ENDOCRINOLOGY: No Thyromegaly  PSYCH: Appropriate    Labs:  25                            11.8   5.29  )-----------( 109      ( 29 Jan 2018 09:23 )             36.2                         12.0   4.75  )-----------( 107      ( 28 Jan 2018 03:53 )             37.5                         12.0   5.32  )-----------( 120      ( 27 Jan 2018 06:03 )             38.5                         12.0   4.82  )-----------( 124      ( 26 Jan 2018 06:45 )             38.6     01-29    135  |  95<L>  |  59<H>  ----------------------------<  123<H>  4.9   |  24  |  8.62<H>  01-28    132<L>  |  93<L>  |  41<H>  ----------------------------<  139<H>  4.3   |  25  |  6.68<H>  01-27    134<L>  |  95<L>  |  30<H>  ----------------------------<  120<H>  4.0   |  24  |  5.00<H>  01-26    132<L>  |  93<L>  |  35<H>  ----------------------------<  117<H>  4.0   |  26  |  5.95<H>    Ca    8.3<L>      29 Jan 2018 09:23  Ca    8.5      28 Jan 2018 03:53  Phos  5.9     01-29  Phos  4.6     01-28  Mg     2.1     01-29  Mg     2.1     01-28      CAPILLARY BLOOD GLUCOSE            PT/INR - ( 29 Jan 2018 09:23 )   PT: 17.8 SEC;   INR: 1.53          PTT - ( 29 Jan 2018 09:23 )  PTT:46.2 SEC    Cultures:                       Rapid Respiratory Viral Panel Result        01-23 @ 12:20  Rapid RVP --  Coronovirus --  Adenovirus NOT DETECTED  Bordetella Pertussis NOT DETECTED  Chlamydia Pneumonia NOT DETECTED  Entero/RhinovirusNOT DETECTED  HKU1 Coronovirus --  HMPV Coronovirus POSITIVE  Influenza A NOT DETECTED (any subtype)  Influenza AH1 NOT DETECTED  Influenza AH1 2009 NOT DETECTED  Influenza AH3 NOT DETECTED  Influenza B NOT DETECTED  Mycoplasma Pneumoniae NOT DETECTED This nucleic acid amplification assay was performed using  the [employer]. The following pathogens are tested  for: Adenovirus, Coronavirus 229E, Coronavirus HKU1,  Coronavirus NL63, Coronavirus OC43, Human Metapneumovirus  (HMPV), Rhinovirus/Enterovirus, Influenza A H1, Influenza A  H1 2009 (Pandemic H1 2009), Influenza A H3, Influenza A (Flu  A) subtype not identified, Influenza B, Parainfluenza Virus  (types 1, 2, 3, 4), Respiratory Syncytial Virus (RSV),  Bordetella pertussis, Chlamydophila pneumoniae, and  Mycoplasma pneumoniae. A negative FilmArray result does not  always exclude the possibility of viral or bacterial  infection. Laboratory results should always be interpreted  in the context of clinical findings.  NL63 Coronovirus --  OC43 Coronovirus --  Parainfluenza 1 NOT DETECTED  Parainfluenza 2 NOT DETECTED  Parainfluenza 3 NOT DETECTED  Parainfluenza 4 NOT DETECTED  Resp Syncytial Virus NOT DETECTED          Studie< from: Xray Chest 1 View AP- PORTABLE-Urgent (01.27.18 @ 08:35) >  EXAM:  XR CHEST PORTABLE URGENT 1V        PROCEDURE DATE:  Jan 27 2018         INTERPRETATION:  EXAMINATION: XR CHEST PORTABLE URGENT 1V    CLINICAL INDICATION: Shortness of Breath    TECHNIQUE: Frontal radiograph of the chest was obtained.    COMPARISON: 1/23/2018.    FINDINGS:     Cardiac silhouette not well evaluated. Left-sided defibrillator.   Right-sided central venous catheter with tip overlying SVC. Bilateral   reticular opacities similar to prior studies. No pleural effusion or   pneumothorax.    IMPRESSION:   No change from prior study.                  ALIX HESTER M.D., ATTENDING RADIOLOGIST  This document has been electronically signed. Jan 28 2018  1:01PM              < end of copied text >  s  Chest X-RAY  CT SCAN Chest   Venous Dopplers: LE:   CT Abdomen  Others      < from: CT Chest No Cont (06.05.17 @ 15:45) >  EART: Cardiomegaly. No pericardial effusion.  MEDIASTINUM AND ASH: No lymphadenopathy.  CHEST WALL AND LOWER NECK: Left chest wall AICD. Generalized anasarca.  UPPER ABDOMEN: Hyperdense material within the gallbladder may reflect   vicarious excretion of iodinated contrast from recent procedure.  BONES: Degenerative changes of the spine.    IMPRESSION:    Extensive pulmonary fibrosis.    Evidence of superimposed mild small airway disease.              LORI ESTRELLA M.D., RADIOLOGY RESIDENT  This document has been electronically signed.  TERESSA BAIRD M.D., ATTENDING RADIOLOGIST  This document has been electronically signed. Jun 5 2017  4:30PM        < end of copied text >

## 2018-01-29 NOTE — PROGRESS NOTE ADULT - ASSESSMENT
62yM w/pmhx ESRD (HD MWF), NICM (EF 21%), ICD, PICC line in place with home dobutamine drip, afib on coumadin, COPD on home O2 (4-5L), hypotension on midodrine (SBP baseline 80s-90s) (follows with Dr. Davis) who presents for diarrhea and hypotension to 70s, found to have human metapneumovirus. Patient with 3 watery diarrhea bowel movement at home. Held his stool softners.  Has not had diarrhea in the hospital. Patient with supra-therapeutic INR on admission likely due to diarrhea, now subtherapeutic.

## 2018-01-29 NOTE — PROGRESS NOTE ADULT - SUBJECTIVE AND OBJECTIVE BOX
Patient is a 62y old  Male who presents with a chief complaint of Diarrhea (25 Jan 2018 18:10)    SUBJECTIVE / OVERNIGHT EVENTS:  No acute overnight events. On telemetry afib HR 70-80s occasional PVCs. Patient endorsed improved SOB and non-productive cough. Patient on 4L NC. Patient denied fever, chest pain, abdominal pain, nausea, vomiting, diarrhea, constipation, nausea, vomiting, palpitations.     MEDICATIONS  (STANDING):  ALBUTerol/ipratropium for Nebulization 3 milliLiter(s) Nebulizer every 6 hours  amiodarone    Tablet 100 milliGRAM(s) Oral daily  aspirin enteric coated 81 milliGRAM(s) Oral daily  atorvastatin 80 milliGRAM(s) Oral at bedtime  buDESOnide  80 MICROgram(s)/formoterol 4.5 MICROgram(s) Inhaler 2 Puff(s) Inhalation two times a day  DOBUTamine Infusion 10.019 MICROgram(s)/kG/Min (10.4 mL/Hr) IV Continuous <Continuous>  docusate sodium 100 milliGRAM(s) Oral two times a day  finasteride 5 milliGRAM(s) Oral daily  levothyroxine 75 MICROGram(s) Oral daily  midodrine 30 milliGRAM(s) Oral every 8 hours  senna 2 Tablet(s) Oral at bedtime    MEDICATIONS  (PRN):  benzocaine 15 mG/menthol 3.6 mG Lozenge 1 Lozenge Oral every 6 hours PRN Sore Throat  HYDROcodone/homatropine Syrup 5 milliLiter(s) Oral every 6 hours PRN Cough  polyethylene glycol 3350 17 Gram(s) Oral daily PRN Constipation    Vital Signs Last 24 Hrs  T(C): 36.7 (29 Jan 2018 06:29), Max: 36.7 (28 Jan 2018 11:00)  T(F): 98 (29 Jan 2018 06:29), Max: 98 (28 Jan 2018 11:00)  HR: 79 (29 Jan 2018 06:29) (70 - 89)  BP: 90/59 (29 Jan 2018 06:29) (74/48 - 111/64)  RR: 18 (29 Jan 2018 06:29) (18 - 20)  SpO2: 98% (29 Jan 2018 06:29) (90% - 100%)  CAPILLARY BLOOD GLUCOSE    I&O's Summary    28 Jan 2018 07:01  -  29 Jan 2018 07:00  --------------------------------------------------------  IN: 1144.8 mL / OUT: 0 mL / NET: 1144.8 mL    PHYSICAL EXAM:  GENERAL: NAD, well-developed  HEAD:  Atraumatic, Normocephalic  EYES: EOMI, PERRLA, conjunctiva and sclera clear  NECK: Supple, No JVD  CHEST/LUNG: Mostly clear to auscultation bilaterally; soft crackles   HEART: irregular rhythm; Normal S1 S2, No murmurs, rubs, or gallops  ABDOMEN: Soft, Nontender, Nondistended; Bowel sounds present  EXTREMITIES:  2+ Peripheral Pulses, No clubbing, cyanosis, or edema  PSYCH: AAOx3  NEUROLOGY: non-focal  SKIN: No rashes or lesions    LABS:                        12.0   4.75  )-----------( 107      ( 28 Jan 2018 03:53 )             37.5     01-28    132<L>  |  93<L>  |  41<H>  ----------------------------<  139<H>  4.3   |  25  |  6.68<H>    Ca    8.5      28 Jan 2018 03:53  Phos  4.6     01-28  Mg     2.1     01-28    PT/INR - ( 28 Jan 2018 03:53 )   PT: 15.7 SEC;   INR: 1.40     PTT - ( 28 Jan 2018 03:53 )  PTT:42.5 SEC    Xray Chest 1 View AP- PORTABLE-Urgent (01.27.18 @ 08:35) >  FINDINGS:   Cardiac silhouette not well evaluated. Left-sided defibrillator.   Right-sided central venous catheter with tip overlying SVC. Bilateral   reticular opacities similar to prior studies. No pleural effusion or   pneumothorax.  IMPRESSION:   No change from prior study.        RADIOLOGY & ADDITIONAL TESTS:     Imaging Personally Reviewed:    Consultant(s) Notes Reviewed:  Pulm, Cards     Care Discussed with Consultants/Other Providers: Pulm, cards Patient is a 62y old  Male who presents with a chief complaint of Diarrhea (25 Jan 2018 18:10)    SUBJECTIVE / OVERNIGHT EVENTS:  No acute overnight events. On telemetry afib HR 70-80s occasional PVCs. Patient endorsed improved SOB and non-productive cough. Patient on 4L NC. Patient denied fever, chest pain, abdominal pain, nausea, vomiting, diarrhea, constipation, nausea, vomiting, palpitations.     MEDICATIONS  (STANDING):  ALBUTerol/ipratropium for Nebulization 3 milliLiter(s) Nebulizer every 6 hours  amiodarone    Tablet 100 milliGRAM(s) Oral daily  aspirin enteric coated 81 milliGRAM(s) Oral daily  atorvastatin 80 milliGRAM(s) Oral at bedtime  buDESOnide  80 MICROgram(s)/formoterol 4.5 MICROgram(s) Inhaler 2 Puff(s) Inhalation two times a day  DOBUTamine Infusion 10.019 MICROgram(s)/kG/Min (10.4 mL/Hr) IV Continuous <Continuous>  docusate sodium 100 milliGRAM(s) Oral two times a day  finasteride 5 milliGRAM(s) Oral daily  levothyroxine 75 MICROGram(s) Oral daily  midodrine 30 milliGRAM(s) Oral every 8 hours  senna 2 Tablet(s) Oral at bedtime    MEDICATIONS  (PRN):  benzocaine 15 mG/menthol 3.6 mG Lozenge 1 Lozenge Oral every 6 hours PRN Sore Throat  HYDROcodone/homatropine Syrup 5 milliLiter(s) Oral every 6 hours PRN Cough  polyethylene glycol 3350 17 Gram(s) Oral daily PRN Constipation    Vital Signs Last 24 Hrs  T(C): 36.7 (29 Jan 2018 06:29), Max: 36.7 (28 Jan 2018 11:00)  T(F): 98 (29 Jan 2018 06:29), Max: 98 (28 Jan 2018 11:00)  HR: 79 (29 Jan 2018 06:29) (70 - 89)  BP: 90/59 (29 Jan 2018 06:29) (74/48 - 111/64)  RR: 18 (29 Jan 2018 06:29) (18 - 20)  SpO2: 98% (29 Jan 2018 06:29) (90% - 100%)  CAPILLARY BLOOD GLUCOSE    I&O's Summary    28 Jan 2018 07:01  -  29 Jan 2018 07:00  --------------------------------------------------------  IN: 1144.8 mL / OUT: 0 mL / NET: 1144.8 mL    PHYSICAL EXAM:  GENERAL: NAD, well-developed  HEAD:  Atraumatic, Normocephalic  EYES: EOMI, PERRLA, conjunctiva and sclera clear  NECK: Supple, No JVD  CHEST/LUNG: Mostly clear to auscultation bilaterally; soft crackles   HEART: irregular rhythm; Normal S1 S2, No murmurs, rubs, or gallops  ABDOMEN: Soft, Nontender, Nondistended; Bowel sounds present  EXTREMITIES:  2+ Peripheral Pulses, No clubbing, cyanosis, or edema  PSYCH: AAOx3  NEUROLOGY: non-focal  SKIN: No rashes or lesions    LABS:                        12.0   4.75  )-----------( 107      ( 28 Jan 2018 03:53 )             37.5     01-28    132<L>  |  93<L>  |  41<H>  ----------------------------<  139<H>  4.3   |  25  |  6.68<H>    Ca    8.5      28 Jan 2018 03:53  Phos  4.6     01-28  Mg     2.1     01-28    PT/INR - ( 28 Jan 2018 03:53 )   PT: 15.7 SEC;   INR: 1.40     PTT - ( 28 Jan 2018 03:53 )  PTT:42.5 SEC    New labs pending.     Xray Chest 1 View AP- PORTABLE-Urgent (01.27.18 @ 08:35) >  FINDINGS:   Cardiac silhouette not well evaluated. Left-sided defibrillator.   Right-sided central venous catheter with tip overlying SVC. Bilateral   reticular opacities similar to prior studies. No pleural effusion or   pneumothorax.  IMPRESSION:   No change from prior study.      RADIOLOGY & ADDITIONAL TESTS:     Imaging Personally Reviewed:    Consultant(s) Notes Reviewed:  Pulm, Cards     Care Discussed with Consultants/Other Providers: Pulm, cards

## 2018-01-29 NOTE — PROGRESS NOTE ADULT - PROBLEM SELECTOR PLAN 1
Electrolytes acceptable. chronic vol overload  c/w HD w/2k bath, uf 1.5kg as tolerated, low bath temp, Na variation. bp low, asymp  c/w renal diet, fluid restriction  Cont midodrine preHD.

## 2018-01-29 NOTE — PROGRESS NOTE ADULT - PROBLEM SELECTOR PLAN 5
- Patient on 4 liters of oxygen at home. No wheezes on exam. Bibasilar crackles.  - c/w  budosenide -formoterol, Duonebs q6 hr  - Pulmonary fibrosis seen on last CT scan, could be side effect of amiodarone, will decrease amio dose to 100mg as per cards.  - Should get PFT and DLCO as an outpatient to eval pulmonary fibrosis - Patient on 4 liters of oxygen at home. No wheezes on exam. Bibasilar crackles.  - c/w  budosenide -formoterol, Duonebs q6 hr  - Pulmonary fibrosis seen on last CT scan, could be side effect of amiodarone, will decrease amio dose to 100mg as per cards. Pulm recs appreciated.   - Should get PFT and DLCO as an outpatient to eval pulmonary fibrosis

## 2018-01-30 LAB
APTT BLD: 44.1 SEC — HIGH (ref 27.5–37.4)
BASOPHILS # BLD AUTO: 0.02 K/UL — SIGNIFICANT CHANGE UP (ref 0–0.2)
BASOPHILS NFR BLD AUTO: 0.4 % — SIGNIFICANT CHANGE UP (ref 0–2)
BUN SERPL-MCNC: 32 MG/DL — HIGH (ref 7–23)
CALCIUM SERPL-MCNC: 8.3 MG/DL — LOW (ref 8.4–10.5)
CHLORIDE SERPL-SCNC: 92 MMOL/L — LOW (ref 98–107)
CO2 SERPL-SCNC: 25 MMOL/L — SIGNIFICANT CHANGE UP (ref 22–31)
CREAT SERPL-MCNC: 5.69 MG/DL — HIGH (ref 0.5–1.3)
EOSINOPHIL # BLD AUTO: 0.15 K/UL — SIGNIFICANT CHANGE UP (ref 0–0.5)
EOSINOPHIL NFR BLD AUTO: 2.8 % — SIGNIFICANT CHANGE UP (ref 0–6)
GLUCOSE SERPL-MCNC: 102 MG/DL — HIGH (ref 70–99)
HCT VFR BLD CALC: 38.2 % — LOW (ref 39–50)
HGB BLD-MCNC: 12.1 G/DL — LOW (ref 13–17)
IMM GRANULOCYTES # BLD AUTO: 0.02 # — SIGNIFICANT CHANGE UP
IMM GRANULOCYTES NFR BLD AUTO: 0.4 % — SIGNIFICANT CHANGE UP (ref 0–1.5)
INR BLD: 1.71 — HIGH (ref 0.88–1.17)
LYMPHOCYTES # BLD AUTO: 0.78 K/UL — LOW (ref 1–3.3)
LYMPHOCYTES # BLD AUTO: 14.6 % — SIGNIFICANT CHANGE UP (ref 13–44)
MAGNESIUM SERPL-MCNC: 1.9 MG/DL — SIGNIFICANT CHANGE UP (ref 1.6–2.6)
MCHC RBC-ENTMCNC: 30.6 PG — SIGNIFICANT CHANGE UP (ref 27–34)
MCHC RBC-ENTMCNC: 31.7 % — LOW (ref 32–36)
MCV RBC AUTO: 96.7 FL — SIGNIFICANT CHANGE UP (ref 80–100)
MONOCYTES # BLD AUTO: 0.57 K/UL — SIGNIFICANT CHANGE UP (ref 0–0.9)
MONOCYTES NFR BLD AUTO: 10.7 % — SIGNIFICANT CHANGE UP (ref 2–14)
NEUTROPHILS # BLD AUTO: 3.79 K/UL — SIGNIFICANT CHANGE UP (ref 1.8–7.4)
NEUTROPHILS NFR BLD AUTO: 71.1 % — SIGNIFICANT CHANGE UP (ref 43–77)
NRBC # FLD: 0 — SIGNIFICANT CHANGE UP
PHOSPHATE SERPL-MCNC: 4.2 MG/DL — SIGNIFICANT CHANGE UP (ref 2.5–4.5)
PLATELET # BLD AUTO: 96 K/UL — LOW (ref 150–400)
PMV BLD: 12.3 FL — SIGNIFICANT CHANGE UP (ref 7–13)
POTASSIUM SERPL-MCNC: 4.5 MMOL/L — SIGNIFICANT CHANGE UP (ref 3.5–5.3)
POTASSIUM SERPL-SCNC: 4.5 MMOL/L — SIGNIFICANT CHANGE UP (ref 3.5–5.3)
PROTHROM AB SERPL-ACNC: 19.2 SEC — HIGH (ref 9.8–13.1)
RBC # BLD: 3.95 M/UL — LOW (ref 4.2–5.8)
RBC # FLD: 16.5 % — HIGH (ref 10.3–14.5)
SODIUM SERPL-SCNC: 132 MMOL/L — LOW (ref 135–145)
WBC # BLD: 5.33 K/UL — SIGNIFICANT CHANGE UP (ref 3.8–10.5)
WBC # FLD AUTO: 5.33 K/UL — SIGNIFICANT CHANGE UP (ref 3.8–10.5)

## 2018-01-30 PROCEDURE — 99233 SBSQ HOSP IP/OBS HIGH 50: CPT | Mod: GC

## 2018-01-30 RX ORDER — IPRATROPIUM/ALBUTEROL SULFATE 18-103MCG
3 AEROSOL WITH ADAPTER (GRAM) INHALATION EVERY 6 HOURS
Qty: 0 | Refills: 0 | Status: DISCONTINUED | OUTPATIENT
Start: 2018-01-30 | End: 2018-01-31

## 2018-01-30 RX ORDER — WARFARIN SODIUM 2.5 MG/1
3 TABLET ORAL ONCE
Qty: 0 | Refills: 0 | Status: COMPLETED | OUTPATIENT
Start: 2018-01-30 | End: 2018-01-30

## 2018-01-30 RX ADMIN — Medication 10.4 MICROGRAM(S)/KG/MIN: at 13:07

## 2018-01-30 RX ADMIN — Medication 81 MILLIGRAM(S): at 14:00

## 2018-01-30 RX ADMIN — ATORVASTATIN CALCIUM 80 MILLIGRAM(S): 80 TABLET, FILM COATED ORAL at 21:32

## 2018-01-30 RX ADMIN — Medication 100 MILLIGRAM(S): at 17:49

## 2018-01-30 RX ADMIN — BENZOCAINE AND MENTHOL 1 LOZENGE: 5; 1 LIQUID ORAL at 21:32

## 2018-01-30 RX ADMIN — BUDESONIDE AND FORMOTEROL FUMARATE DIHYDRATE 2 PUFF(S): 160; 4.5 AEROSOL RESPIRATORY (INHALATION) at 08:56

## 2018-01-30 RX ADMIN — Medication 3 MILLILITER(S): at 08:54

## 2018-01-30 RX ADMIN — BUDESONIDE AND FORMOTEROL FUMARATE DIHYDRATE 2 PUFF(S): 160; 4.5 AEROSOL RESPIRATORY (INHALATION) at 21:32

## 2018-01-30 RX ADMIN — MIDODRINE HYDROCHLORIDE 30 MILLIGRAM(S): 2.5 TABLET ORAL at 21:32

## 2018-01-30 RX ADMIN — FINASTERIDE 5 MILLIGRAM(S): 5 TABLET, FILM COATED ORAL at 13:59

## 2018-01-30 RX ADMIN — MIDODRINE HYDROCHLORIDE 30 MILLIGRAM(S): 2.5 TABLET ORAL at 14:00

## 2018-01-30 RX ADMIN — WARFARIN SODIUM 3 MILLIGRAM(S): 2.5 TABLET ORAL at 17:51

## 2018-01-30 RX ADMIN — Medication 3 MILLILITER(S): at 03:55

## 2018-01-30 RX ADMIN — Medication 75 MICROGRAM(S): at 05:03

## 2018-01-30 RX ADMIN — MIDODRINE HYDROCHLORIDE 30 MILLIGRAM(S): 2.5 TABLET ORAL at 05:03

## 2018-01-30 RX ADMIN — AMIODARONE HYDROCHLORIDE 100 MILLIGRAM(S): 400 TABLET ORAL at 05:03

## 2018-01-30 NOTE — PROGRESS NOTE ADULT - SUBJECTIVE AND OBJECTIVE BOX
SUBJECTIVE: Cough improved this morning.  Resting comfortably  	  MEDICATIONS:  amiodarone    Tablet 100 milliGRAM(s) Oral daily  DOBUTamine Infusion 10.019 MICROgram(s)/kG/Min IV Continuous <Continuous>  midodrine 30 milliGRAM(s) Oral every 8 hours  ALBUTerol/ipratropium for Nebulization 3 milliLiter(s) Nebulizer every 6 hours  buDESOnide  80 MICROgram(s)/formoterol 4.5 MICROgram(s) Inhaler 2 Puff(s) Inhalation two times a day  HYDROcodone/homatropine Syrup 5 milliLiter(s) Oral every 6 hours PRN  docusate sodium 100 milliGRAM(s) Oral two times a day  polyethylene glycol 3350 17 Gram(s) Oral daily PRN  senna 2 Tablet(s) Oral at bedtime  atorvastatin 80 milliGRAM(s) Oral at bedtime  finasteride 5 milliGRAM(s) Oral daily  levothyroxine 75 MICROGram(s) Oral daily  aspirin enteric coated 81 milliGRAM(s) Oral daily  benzocaine 15 mG/menthol 3.6 mG Lozenge 1 Lozenge Oral every 6 hours PRN      REVIEW OF SYSTEMS:    CONSTITUTIONAL: No fever, weight loss, or fatigue  EYES: No eye pain, visual disturbances, or discharge  NECK: No pain or stiffness  RESPIRATORY: No cough, wheezing, chills or hemoptysis; No Shortness of Breath  CARDIOVASCULAR: No chest pain, palpitations, dizziness, or leg swelling  GASTROINTESTINAL: No abdominal or epigastric pain. No nausea, vomiting, or hematemesis; No diarrhea or constipation. No melena or hematochezia.  GENITOURINARY: No dysuria, frequency, hematuria, or incontinence  NEUROLOGICAL: No headaches, memory loss, loss of strength, numbness, or tremors  SKIN: No itching, burning, rashes, or lesions   LYMPH Nodes: No enlarged glands  MUSCULOSKELETAL: No joint pain or swelling; No muscle, back, or extremity pain  All other review of systems are negative.  	  [ ] Unable to obtain    PHYSICAL EXAM:  T(C): 36.3 (01-30-18 @ 04:59), Max: 36.8 (01-30-18 @ 02:33)  HR: 71 (01-30-18 @ 04:59) (70 - 93)  BP: 105/57 (01-30-18 @ 04:59) (88/53 - 105/57)  RR: 17 (01-30-18 @ 04:59) (17 - 18)  SpO2: 97% (01-30-18 @ 04:59) (93% - 100%)  Wt(kg): --  I&O's Summary    29 Jan 2018 07:01  -  30 Jan 2018 07:00  --------------------------------------------------------  IN: 864.4 mL / OUT: 1900 mL / NET: -1035.6 mL    < from: Xray Chest 1 View AP- PORTABLE-Urgent (01.27.18 @ 08:35) >  Cardiac silhouette not well evaluated. Left-sided defibrillator.   Right-sided central venous catheter with tip overlying SVC. Bilateral   reticular opacities similar to prior studies. No pleural effusion or   pneumothorax.    IMPRESSION:   No change from prior study.    < end of copied text >        PHYSICAL EXAM    Appearance: Normal	  HEENT:   Normal oral mucosa, PERRL, EOMI	  NECK: Soft and supple, No LAD, No JVD  Cardiovascular: Irregular Rate and Rhythm, Normal S1 S2, No murmurs, No clicks, gallops or rubs  Respiratory: Rhonchi bilaterally	  Gastrointestinal:  Soft, Non-tender, + BS	  Skin: No rashes, No ecchymoses, No cyanosis  Neurologic: Non-focal  Extremities: No clubbing, cyanosis or edema  Vascular: Peripheral pulses palpable 2+ bilaterally    TELEMETRY: Atrial flutter with 3:1 block	    	    LABS:	 	                            12.1   5.33  )-----------( 96       ( 30 Jan 2018 06:30 )             38.2     01-30    132<L>  |  92<L>  |  32<H>  ----------------------------<  102<H>  4.5   |  25  |  5.69<H>    Ca    8.3<L>      30 Jan 2018 06:30  Phos  4.2     01-30  Mg     1.9     01-30

## 2018-01-30 NOTE — PROGRESS NOTE ADULT - SUBJECTIVE AND OBJECTIVE BOX
Patient is a 62y old  Male who presents with a chief complaint of Diarrhea, hypotension (25 Jan 2018 18:10)      Any change in ROS: SOB on exertion and while sitting     MEDICATIONS  (STANDING):  amiodarone    Tablet 100 milliGRAM(s) Oral daily  aspirin enteric coated 81 milliGRAM(s) Oral daily  atorvastatin 80 milliGRAM(s) Oral at bedtime  buDESOnide  80 MICROgram(s)/formoterol 4.5 MICROgram(s) Inhaler 2 Puff(s) Inhalation two times a day  DOBUTamine Infusion 10.019 MICROgram(s)/kG/Min (10.4 mL/Hr) IV Continuous <Continuous>  docusate sodium 100 milliGRAM(s) Oral two times a day  finasteride 5 milliGRAM(s) Oral daily  levothyroxine 75 MICROGram(s) Oral daily  midodrine 30 milliGRAM(s) Oral every 8 hours  senna 2 Tablet(s) Oral at bedtime  sodium chloride 0.9% lock flush 20 milliLiter(s) IV Push once    MEDICATIONS  (PRN):  ALBUTerol/ipratropium for Nebulization 3 milliLiter(s) Nebulizer every 6 hours PRN Shortness of Breath and/or Wheezing  benzocaine 15 mG/menthol 3.6 mG Lozenge 1 Lozenge Oral every 6 hours PRN Sore Throat  HYDROcodone/homatropine Syrup 5 milliLiter(s) Oral every 6 hours PRN Cough  polyethylene glycol 3350 17 Gram(s) Oral daily PRN Constipation  sodium chloride 0.9% lock flush 10 milliLiter(s) IV Push every 1 hour PRN After each medication administration    Vital Signs Last 24 Hrs  T(C): 36.4 (30 Jan 2018 13:55), Max: 36.8 (30 Jan 2018 02:33)  T(F): 97.6 (30 Jan 2018 13:55), Max: 98.2 (30 Jan 2018 02:33)  HR: 81 (30 Jan 2018 13:55) (70 - 93)  BP: 79/49 (30 Jan 2018 13:55) (79/49 - 105/57)  BP(mean): --  RR: 18 (30 Jan 2018 13:55) (17 - 18)  SpO2: 93% (30 Jan 2018 13:55) (93% - 98%)    I&O's Summary    29 Jan 2018 07:01  -  30 Jan 2018 07:00  --------------------------------------------------------  IN: 864.4 mL / OUT: 1900 mL / NET: -1035.6 mL    30 Jan 2018 07:01  -  30 Jan 2018 18:10  --------------------------------------------------------  IN: 440 mL / OUT: 0 mL / NET: 440 mL          Physical Exam:   GENERAL: NAD, well-groomed, well-developed  HEENT: BELL/   Atraumatic, Normocephalic  ENMT: No tonsillar erythema, exudates, or enlargement; Moist mucous membranes, Good dentition, No lesions  NECK: Supple, No JVD, Normal thyroid  CHEST/LUNG:  bilateral crackles++  CVS: Regular rate and rhythm; No murmurs, rubs, or gallops  GI: : Soft, Nontender, Nondistended; Bowel sounds present  NERVOUS SYSTEM:  Alert & Oriented X3  EXTREMITIES:  2+ Peripheral Pulses, No clubbing, cyanosis, or edema  LYMPH: No lymphadenopathy noted  SKIN: No rashes or lesions  ENDOCRINOLOGY: No Thyromegaly  PSYCH: Appropriate    Labs:                              12.1   5.33  )-----------( 96       ( 30 Jan 2018 06:30 )             38.2                         11.8   5.29  )-----------( 109      ( 29 Jan 2018 09:23 )             36.2                         12.0   4.75  )-----------( 107      ( 28 Jan 2018 03:53 )             37.5                         12.0   5.32  )-----------( 120      ( 27 Jan 2018 06:03 )             38.5     01-30    132<L>  |  92<L>  |  32<H>  ----------------------------<  102<H>  4.5   |  25  |  5.69<H>  01-29    135  |  95<L>  |  59<H>  ----------------------------<  123<H>  4.9   |  24  |  8.62<H>  01-28    132<L>  |  93<L>  |  41<H>  ----------------------------<  139<H>  4.3   |  25  |  6.68<H>  01-27    134<L>  |  95<L>  |  30<H>  ----------------------------<  120<H>  4.0   |  24  |  5.00<H>    Ca    8.3<L>      30 Jan 2018 06:30  Ca    8.3<L>      29 Jan 2018 09:23  Phos  4.2     01-30  Phos  5.9     01-29  Mg     1.9     01-30  Mg     2.1     01-29      CAPILLARY BLOOD GLUCOSE            PT/INR - ( 30 Jan 2018 06:30 )   PT: 19.2 SEC;   INR: 1.71          PTT - ( 30 Jan 2018 06:30 )  PTT:44.1 SEC    Cultures:           < from: Xray Chest 1 View AP- PORTABLE-Urgent (01.27.18 @ 08:35) >    FINDINGS:     Cardiac silhouette not well evaluated. Left-sided defibrillator.   Right-sided central venous catheter with tip overlying SVC. Bilateral   reticular opacities similar to prior studies. No pleural effusion or   pneumothorax.    IMPRESSION:   No change from prior study.                  ALIX HESTER M.D., ATTENDING RADIOLOGIST  This document has been electronically signed. Jan 28 2018  1:01PM    < end of copied text >                    Studies  Chest X-RAY  CT SCAN Chest   Venous Dopplers: LE:   CT Abdomen  Others

## 2018-01-30 NOTE — PROVIDER CONTACT NOTE (OTHER) - BACKGROUND
(Admit Diagnosis) Diarrhea  (PMH) DM (diabetes mellitus)  (PMH) Seizure  (PMH) Chronic hypotension  (PMH) AF (atrial fibrillation)

## 2018-01-30 NOTE — PROGRESS NOTE ADULT - PROBLEM SELECTOR PLAN 2
pt has had extensive pulmonary fibrosis: Per pts sister: genny I had spoken to her in 2017: he did have lung biopsy : but he was not on any specific treatment: He is on 4 L of oxygen at home: per prior notes. Spoke to his wife : he does have pulmonary fibrosis : he did have biopsy in 2016: he was told he has ILD and the treatment they gave her did not work; including prednisone: He iso n oxygen at home: And he does have COPD too: Given his extensive pulmonary history and extensive pulmonary fibrosis: he is not a good candidate for amiodarone: Not much reserve in him left:  1/30 As above:

## 2018-01-30 NOTE — PROGRESS NOTE ADULT - ASSESSMENT
62y Male with ESRD on HD MWF, NICM with severe LV dysfnxn (EF 21%), s/p biotronic ICD, on home dobutamine drip, Afib on coumadin, COPD on 3-4L home O2, DM, and chronic hypotension on midodrine presented with +RSV. Renal following for ESRD, for HD      ESRD on dialysis, Maintenance HD schedule MWF. electrolytes acceptable.   chronic CHF-stable    labs reviewed

## 2018-01-30 NOTE — PROGRESS NOTE ADULT - PROBLEM SELECTOR PLAN 1
Electrolytes acceptable. chronic vol overload  PLan for HD tomorrow, maximize UF goal, as BP tolerates.   c/w renal diet, fluid restriction  Cont midodrine preHD.

## 2018-01-30 NOTE — PROGRESS NOTE ADULT - SUBJECTIVE AND OBJECTIVE BOX
Pt seen and examined at bedside  Pt feeling overall improved. Still with cough. Denies SOB     Allergies:  No Known Allergies    Hospital Medications:   MEDICATIONS  (STANDING):  ALBUTerol/ipratropium for Nebulization 3 milliLiter(s) Nebulizer every 6 hours  amiodarone    Tablet 100 milliGRAM(s) Oral daily  aspirin enteric coated 81 milliGRAM(s) Oral daily  atorvastatin 80 milliGRAM(s) Oral at bedtime  buDESOnide  80 MICROgram(s)/formoterol 4.5 MICROgram(s) Inhaler 2 Puff(s) Inhalation two times a day  DOBUTamine Infusion 10.019 MICROgram(s)/kG/Min (10.4 mL/Hr) IV Continuous <Continuous>  docusate sodium 100 milliGRAM(s) Oral two times a day  finasteride 5 milliGRAM(s) Oral daily  levothyroxine 75 MICROGram(s) Oral daily  midodrine 30 milliGRAM(s) Oral every 8 hours  senna 2 Tablet(s) Oral at bedtime  sodium chloride 0.9% lock flush 20 milliLiter(s) IV Push once    VITALS:  T(F): 98 (01-30-18 @ 09:00), Max: 98.2 (01-30-18 @ 02:33)  HR: 70 (01-30-18 @ 09:00)  BP: 100/60 (01-30-18 @ 09:00)  RR: 18 (01-30-18 @ 09:00)  SpO2: 98% (01-30-18 @ 09:00)  Wt(kg): --    01-29 @ 07:01 - 01-30 @ 07:00  --------------------------------------------------------  IN: 864.4 mL / OUT: 1900 mL / NET: -1035.6 mL    01-30 @ 07:01 - 01-30 @ 13:49  --------------------------------------------------------  IN: 200 mL / OUT: 0 mL / NET: 200 mL      PHYSICAL EXAM:  Constitutional: NAD  HEENT: anicteric sclera, oropharynx clear, MMM  Neck: No JVD  Respiratory: CTAB, no wheezes, rales or rhonchi  Cardiovascular: S1, S2, RRR  Gastrointestinal: BS+, soft, NT/ND  Extremities: No cyanosis or clubbing. No peripheral edema  Neurological: A/O x 3, no focal deficits  Psychiatric: Normal mood, normal affect  : No CVA tenderness. No rosa.   Skin: No rashes  Vascular Access: RIJ tunneled cath     LABS:  01-30    132<L>  |  92<L>  |  32<H>  ----------------------------<  102<H>  4.5   |  25  |  5.69<H>    Ca    8.3<L>      30 Jan 2018 06:30  Phos  4.2     01-30  Mg     1.9     01-30      Creatinine Trend: 5.69 <--, 8.62 <--, 6.68 <--, 5.00 <--, 5.95 <--, 4.62 <--, 6.36 <--                        12.1   5.33  )-----------( 96       ( 30 Jan 2018 06:30 )             38.2     Urine Studies:      RADIOLOGY & ADDITIONAL STUDIES:

## 2018-01-30 NOTE — PROGRESS NOTE ADULT - PROBLEM SELECTOR PLAN 4
- patient with EF less than 10% as per Dr. Davis (cardiologist), recs apprecaited. Continue with amiodarone 100mg PO daily.   - Patient on fixed dose dobutamine gtt at home, patient rate currently 10.4 mL/hr of concentration 1000 mg/250 mL, which is 10 mcg/kg/min based on patient's current weight. Per cardiology, continue current rate.   - New picc line placed on Left arm.

## 2018-01-30 NOTE — PROGRESS NOTE ADULT - ASSESSMENT
Patient with end stage cardiomyopathy on Dobutamine and hemodialysis.  PICC line replaced yesterday.  The pulmonary fibrosis found on CT scan from previous admission is old and has been there from many years secondary to rheumatoid arthritis as per Dr. Davis who has been following Mr. Plascencia for over 2 years.  Appreciate input from pulmonary about pulmonary fibrosis.  Patient has had lethal ventricular and atrial arrhythmias and has been on chronic oxygen since early 2016.  Would continue amiodarone 100 mg po qd for the atrial fib/flutter understanding he has poor pulm. reserve.  Risk of stopping amiodarone is more dangerous than benefit of continuing it.

## 2018-01-30 NOTE — PROGRESS NOTE ADULT - SUBJECTIVE AND OBJECTIVE BOX
Patient is a 62y old  Male who presents with a chief complaint of Diarrhea (25 Jan 2018 18:10)    SUBJECTIVE / OVERNIGHT EVENTS:  On telemetry patient afib-aflutter HR 70-80s, PVCs occasionally. New PICC line and left arm. Patient denied fever, chills, chest pain, abdominal pain, palpitations, diarrhea, palpitations, headache, lightheadiness, dizziness. Endorses improved cough and sob.     MEDICATIONS  (STANDING):  ALBUTerol/ipratropium for Nebulization 3 milliLiter(s) Nebulizer every 6 hours  amiodarone    Tablet 100 milliGRAM(s) Oral daily  aspirin enteric coated 81 milliGRAM(s) Oral daily  atorvastatin 80 milliGRAM(s) Oral at bedtime  buDESOnide  80 MICROgram(s)/formoterol 4.5 MICROgram(s) Inhaler 2 Puff(s) Inhalation two times a day  DOBUTamine Infusion 10.019 MICROgram(s)/kG/Min (10.4 mL/Hr) IV Continuous <Continuous>  docusate sodium 100 milliGRAM(s) Oral two times a day  finasteride 5 milliGRAM(s) Oral daily  levothyroxine 75 MICROGram(s) Oral daily  midodrine 30 milliGRAM(s) Oral every 8 hours  senna 2 Tablet(s) Oral at bedtime  sodium chloride 0.9% lock flush 20 milliLiter(s) IV Push once    MEDICATIONS  (PRN):  benzocaine 15 mG/menthol 3.6 mG Lozenge 1 Lozenge Oral every 6 hours PRN Sore Throat  HYDROcodone/homatropine Syrup 5 milliLiter(s) Oral every 6 hours PRN Cough  polyethylene glycol 3350 17 Gram(s) Oral daily PRN Constipation  sodium chloride 0.9% lock flush 10 milliLiter(s) IV Push every 1 hour PRN After each medication administration    Vital Signs Last 24 Hrs  T(C): 36.3 (30 Jan 2018 04:59), Max: 36.8 (30 Jan 2018 02:33)  T(F): 97.4 (30 Jan 2018 04:59), Max: 98.2 (30 Jan 2018 02:33)  HR: 81 (30 Jan 2018 08:54) (70 - 93)  BP: 105/57 (30 Jan 2018 04:59) (88/53 - 105/57)  RR: 17 (30 Jan 2018 04:59) (17 - 18)  SpO2: 95% (30 Jan 2018 08:54) (93% - 100%)  CAPILLARY BLOOD GLUCOSE    I&O's Summary    29 Jan 2018 07:01  -  30 Jan 2018 07:00  --------------------------------------------------------  IN: 864.4 mL / OUT: 1900 mL / NET: -1035.6 mL      PHYSICAL EXAM:  GENERAL: NAD, well-developed  HEAD:  Atraumatic, Normocephalic  EYES: EOMI, PERRLA, conjunctiva and sclera clear  NECK: Supple, No JVD  CHEST/LUNG: Mostly clear to auscultation bilaterally; soft crackles   HEART: irregular rhythm; Normal S1 S2, No murmurs, rubs, or gallops  ABDOMEN: Soft, Nontender, Nondistended; Bowel sounds present  EXTREMITIES:  2+ Peripheral Pulses, No clubbing, cyanosis, or edema  PSYCH: AAOx3  NEUROLOGY: non-focal  SKIN: No rashes or lesions    LABS:             12.1   5.33  )-----------( 96       ( 30 Jan 2018 06:30 )             38.2     01-30  132<L>  |  92<L>  |  32<H>  ----------------------------<  102<H>  4.5   |  25  |  5.69<H>    Ca    8.3<L>      30 Jan 2018 06:30  Phos  4.2     01-30  Mg     1.9     01-30    PT/INR - ( 30 Jan 2018 06:30 )   PT: 19.2 SEC;   INR: 1.71     PTT - ( 30 Jan 2018 06:30 )  PTT:44.1 SEC      RADIOLOGY & ADDITIONAL TESTS: No new imaging.     Imaging Personally Reviewed:    Consultant(s) Notes Reviewed:      Care Discussed with Consultants/Other Providers: Cardiology, Pulmonology, Cardiology Patient is a 62y old  Male who presents with a chief complaint of Diarrhea (25 Jan 2018 18:10)    SUBJECTIVE / OVERNIGHT EVENTS:  On telemetry patient afib-aflutter HR 70-80s, PVCs occasionally. New PICC line and left arm. Patient denied fever, chills, chest pain, abdominal pain, palpitations, diarrhea, palpitations, headache, lightheadiness, dizziness. Endorses improved cough and sob.     MEDICATIONS  (STANDING):  ALBUTerol/ipratropium for Nebulization 3 milliLiter(s) Nebulizer every 6 hours  amiodarone    Tablet 100 milliGRAM(s) Oral daily  aspirin enteric coated 81 milliGRAM(s) Oral daily  atorvastatin 80 milliGRAM(s) Oral at bedtime  buDESOnide  80 MICROgram(s)/formoterol 4.5 MICROgram(s) Inhaler 2 Puff(s) Inhalation two times a day  DOBUTamine Infusion 10.019 MICROgram(s)/kG/Min (10.4 mL/Hr) IV Continuous <Continuous>  docusate sodium 100 milliGRAM(s) Oral two times a day  finasteride 5 milliGRAM(s) Oral daily  levothyroxine 75 MICROGram(s) Oral daily  midodrine 30 milliGRAM(s) Oral every 8 hours  senna 2 Tablet(s) Oral at bedtime  sodium chloride 0.9% lock flush 20 milliLiter(s) IV Push once    MEDICATIONS  (PRN):  benzocaine 15 mG/menthol 3.6 mG Lozenge 1 Lozenge Oral every 6 hours PRN Sore Throat  HYDROcodone/homatropine Syrup 5 milliLiter(s) Oral every 6 hours PRN Cough  polyethylene glycol 3350 17 Gram(s) Oral daily PRN Constipation  sodium chloride 0.9% lock flush 10 milliLiter(s) IV Push every 1 hour PRN After each medication administration    Vital Signs Last 24 Hrs  T(C): 36.3 (30 Jan 2018 04:59), Max: 36.8 (30 Jan 2018 02:33)  T(F): 97.4 (30 Jan 2018 04:59), Max: 98.2 (30 Jan 2018 02:33)  HR: 81 (30 Jan 2018 08:54) (70 - 93)  BP: 105/57 (30 Jan 2018 04:59) (88/53 - 105/57)  RR: 17 (30 Jan 2018 04:59) (17 - 18)  SpO2: 95% (30 Jan 2018 08:54) (93% - 100%)  CAPILLARY BLOOD GLUCOSE    I&O's Summary    29 Jan 2018 07:01  -  30 Jan 2018 07:00  --------------------------------------------------------  IN: 864.4 mL / OUT: 1900 mL / NET: -1035.6 mL      PHYSICAL EXAM:  GENERAL: NAD, well-developed  HEAD:  Atraumatic, Normocephalic  EYES: EOMI, PERRLA, conjunctiva and sclera clear  NECK: Supple, No JVD  CHEST/LUNG: Mostly clear to auscultation bilaterally; soft crackles   HEART: irregular rhythm; Normal S1 S2, No murmurs, rubs, or gallops  ABDOMEN: Soft, Nontender, Nondistended; Bowel sounds present  EXTREMITIES:  2+ Peripheral Pulses, No clubbing, cyanosis, or edema  PSYCH: AAOx3  NEUROLOGY: non-focal  SKIN: Left arm picc line in place, no signs of infection.     LABS:             12.1   5.33  )-----------( 96       ( 30 Jan 2018 06:30 )             38.2     01-30  132<L>  |  92<L>  |  32<H>  ----------------------------<  102<H>  4.5   |  25  |  5.69<H>    Ca    8.3<L>      30 Jan 2018 06:30  Phos  4.2     01-30  Mg     1.9     01-30    PT/INR - ( 30 Jan 2018 06:30 )   PT: 19.2 SEC;   INR: 1.71     PTT - ( 30 Jan 2018 06:30 )  PTT:44.1 SEC      RADIOLOGY & ADDITIONAL TESTS: No new imaging.     Imaging Personally Reviewed:    Consultant(s) Notes Reviewed:      Care Discussed with Consultants/Other Providers: Cardiology, Pulmonology, Cardiology

## 2018-01-30 NOTE — PROGRESS NOTE ADULT - ATTENDING COMMENTS
Patient seen and examined by me.  Patient is a 61 yo man with medical hx significant for ESRD (HD MWF), end stage chronic systolic HF due to NICM (EF 21%) s/p ICD on home dobutamine infusion via PICC, afib on coumadin, COPD and ILD on home O2 (4-5L) admitted for hypotension, found to have human metapneumovirus and malfunctioning PICC.  S/p new PICC placement on 1/29  - transition to PRN albuterol neb treatments  - INR subtherapeutic. Titrate coumadin accordingly for goal INR 2.0-3.0. Continue coumadin 3mg tonight  - f/u pulm recs.  - continue amiodarone 100mg daily as per cardiology recs  - HD as per renal  - supportive care for cough.    DISPO: Plan for d/c tomorrow instead of today since pt does not have any family or friends to provide transportation and his portable O2.

## 2018-01-31 VITALS — DIASTOLIC BLOOD PRESSURE: 50 MMHG | SYSTOLIC BLOOD PRESSURE: 84 MMHG

## 2018-01-31 LAB
BASOPHILS # BLD AUTO: 0.02 K/UL — SIGNIFICANT CHANGE UP (ref 0–0.2)
BASOPHILS NFR BLD AUTO: 0.4 % — SIGNIFICANT CHANGE UP (ref 0–2)
BUN SERPL-MCNC: 43 MG/DL — HIGH (ref 7–23)
CALCIUM SERPL-MCNC: 8.2 MG/DL — LOW (ref 8.4–10.5)
CHLORIDE SERPL-SCNC: 91 MMOL/L — LOW (ref 98–107)
CO2 SERPL-SCNC: 25 MMOL/L — SIGNIFICANT CHANGE UP (ref 22–31)
CREAT SERPL-MCNC: 7.24 MG/DL — HIGH (ref 0.5–1.3)
EOSINOPHIL # BLD AUTO: 0.14 K/UL — SIGNIFICANT CHANGE UP (ref 0–0.5)
EOSINOPHIL NFR BLD AUTO: 2.8 % — SIGNIFICANT CHANGE UP (ref 0–6)
GLUCOSE SERPL-MCNC: 137 MG/DL — HIGH (ref 70–99)
HCT VFR BLD CALC: 36.3 % — LOW (ref 39–50)
HGB BLD-MCNC: 11.3 G/DL — LOW (ref 13–17)
IMM GRANULOCYTES # BLD AUTO: 0.02 # — SIGNIFICANT CHANGE UP
IMM GRANULOCYTES NFR BLD AUTO: 0.4 % — SIGNIFICANT CHANGE UP (ref 0–1.5)
INR BLD: 1.93 — HIGH (ref 0.88–1.17)
LYMPHOCYTES # BLD AUTO: 0.79 K/UL — LOW (ref 1–3.3)
LYMPHOCYTES # BLD AUTO: 15.9 % — SIGNIFICANT CHANGE UP (ref 13–44)
MAGNESIUM SERPL-MCNC: 1.9 MG/DL — SIGNIFICANT CHANGE UP (ref 1.6–2.6)
MCHC RBC-ENTMCNC: 30.2 PG — SIGNIFICANT CHANGE UP (ref 27–34)
MCHC RBC-ENTMCNC: 31.1 % — LOW (ref 32–36)
MCV RBC AUTO: 97.1 FL — SIGNIFICANT CHANGE UP (ref 80–100)
MONOCYTES # BLD AUTO: 0.55 K/UL — SIGNIFICANT CHANGE UP (ref 0–0.9)
MONOCYTES NFR BLD AUTO: 11 % — SIGNIFICANT CHANGE UP (ref 2–14)
NEUTROPHILS # BLD AUTO: 3.46 K/UL — SIGNIFICANT CHANGE UP (ref 1.8–7.4)
NEUTROPHILS NFR BLD AUTO: 69.5 % — SIGNIFICANT CHANGE UP (ref 43–77)
NRBC # FLD: 0 — SIGNIFICANT CHANGE UP
PHOSPHATE SERPL-MCNC: 5.1 MG/DL — HIGH (ref 2.5–4.5)
PLATELET # BLD AUTO: 108 K/UL — LOW (ref 150–400)
PMV BLD: 13.3 FL — HIGH (ref 7–13)
POTASSIUM SERPL-MCNC: 4.2 MMOL/L — SIGNIFICANT CHANGE UP (ref 3.5–5.3)
POTASSIUM SERPL-SCNC: 4.2 MMOL/L — SIGNIFICANT CHANGE UP (ref 3.5–5.3)
PROTHROM AB SERPL-ACNC: 22.5 SEC — HIGH (ref 9.8–13.1)
RBC # BLD: 3.74 M/UL — LOW (ref 4.2–5.8)
RBC # FLD: 16.4 % — HIGH (ref 10.3–14.5)
SODIUM SERPL-SCNC: 131 MMOL/L — LOW (ref 135–145)
WBC # BLD: 4.98 K/UL — SIGNIFICANT CHANGE UP (ref 3.8–10.5)
WBC # FLD AUTO: 4.98 K/UL — SIGNIFICANT CHANGE UP (ref 3.8–10.5)

## 2018-01-31 PROCEDURE — 99239 HOSP IP/OBS DSCHRG MGMT >30: CPT

## 2018-01-31 RX ORDER — DOBUTAMINE HCL 250MG/20ML
0 VIAL (ML) INTRAVENOUS
Qty: 0 | Refills: 0 | COMMUNITY
Start: 2018-01-31

## 2018-01-31 RX ORDER — DOBUTAMINE HCL 250MG/20ML
0 VIAL (ML) INTRAVENOUS
Qty: 0 | Refills: 0 | COMMUNITY

## 2018-01-31 RX ORDER — ATORVASTATIN CALCIUM 80 MG/1
1 TABLET, FILM COATED ORAL
Qty: 30 | Refills: 0 | OUTPATIENT
Start: 2018-01-31 | End: 2018-03-01

## 2018-01-31 RX ORDER — ASPIRIN/CALCIUM CARB/MAGNESIUM 324 MG
1 TABLET ORAL
Qty: 0 | Refills: 0 | COMMUNITY

## 2018-01-31 RX ORDER — AMIODARONE HYDROCHLORIDE 400 MG/1
1 TABLET ORAL
Qty: 0 | Refills: 0 | COMMUNITY

## 2018-01-31 RX ORDER — WARFARIN SODIUM 2.5 MG/1
1 TABLET ORAL
Qty: 0 | Refills: 0 | COMMUNITY

## 2018-01-31 RX ORDER — FINASTERIDE 5 MG/1
1 TABLET, FILM COATED ORAL
Qty: 0 | Refills: 0 | COMMUNITY

## 2018-01-31 RX ORDER — LEVOTHYROXINE SODIUM 125 MCG
1 TABLET ORAL
Qty: 0 | Refills: 0 | COMMUNITY

## 2018-01-31 RX ORDER — AMIODARONE HYDROCHLORIDE 400 MG/1
1 TABLET ORAL
Qty: 30 | Refills: 0 | OUTPATIENT
Start: 2018-01-31 | End: 2018-03-01

## 2018-01-31 RX ORDER — MIDODRINE HYDROCHLORIDE 2.5 MG/1
3 TABLET ORAL
Qty: 90 | Refills: 0 | OUTPATIENT
Start: 2018-01-31 | End: 2018-03-01

## 2018-01-31 RX ORDER — WARFARIN SODIUM 2.5 MG/1
1 TABLET ORAL
Qty: 30 | Refills: 0 | OUTPATIENT
Start: 2018-01-31 | End: 2018-03-01

## 2018-01-31 RX ORDER — SEVELAMER CARBONATE 2400 MG/1
1 POWDER, FOR SUSPENSION ORAL
Qty: 0 | Refills: 0 | COMMUNITY

## 2018-01-31 RX ORDER — BUDESONIDE AND FORMOTEROL FUMARATE DIHYDRATE 160; 4.5 UG/1; UG/1
2 AEROSOL RESPIRATORY (INHALATION)
Qty: 4 | Refills: 0 | OUTPATIENT
Start: 2018-01-31 | End: 2018-03-01

## 2018-01-31 RX ORDER — LEVOTHYROXINE SODIUM 125 MCG
1 TABLET ORAL
Qty: 30 | Refills: 0 | OUTPATIENT
Start: 2018-01-31 | End: 2018-03-01

## 2018-01-31 RX ORDER — FINASTERIDE 5 MG/1
1 TABLET, FILM COATED ORAL
Qty: 30 | Refills: 0 | OUTPATIENT
Start: 2018-01-31 | End: 2018-03-01

## 2018-01-31 RX ORDER — DOBUTAMINE HCL 250MG/20ML
10.4 VIAL (ML) INTRAVENOUS
Qty: 1 | Refills: 0 | OUTPATIENT
Start: 2018-01-31

## 2018-01-31 RX ORDER — DOCUSATE SODIUM 100 MG
1 CAPSULE ORAL
Qty: 60 | Refills: 0 | OUTPATIENT
Start: 2018-01-31 | End: 2018-03-01

## 2018-01-31 RX ORDER — SENNA PLUS 8.6 MG/1
2 TABLET ORAL
Qty: 60 | Refills: 0 | OUTPATIENT
Start: 2018-01-31 | End: 2018-03-01

## 2018-01-31 RX ORDER — ALBUTEROL 90 UG/1
2 AEROSOL, METERED ORAL
Qty: 1 | Refills: 0 | OUTPATIENT
Start: 2018-01-31

## 2018-01-31 RX ORDER — ASPIRIN/CALCIUM CARB/MAGNESIUM 324 MG
1 TABLET ORAL
Qty: 30 | Refills: 0 | OUTPATIENT
Start: 2018-01-31 | End: 2018-03-01

## 2018-01-31 RX ADMIN — FINASTERIDE 5 MILLIGRAM(S): 5 TABLET, FILM COATED ORAL at 12:23

## 2018-01-31 RX ADMIN — Medication 81 MILLIGRAM(S): at 12:23

## 2018-01-31 RX ADMIN — Medication 100 MILLIGRAM(S): at 18:20

## 2018-01-31 RX ADMIN — Medication 10.4 MICROGRAM(S)/KG/MIN: at 05:26

## 2018-01-31 RX ADMIN — BUDESONIDE AND FORMOTEROL FUMARATE DIHYDRATE 2 PUFF(S): 160; 4.5 AEROSOL RESPIRATORY (INHALATION) at 12:23

## 2018-01-31 RX ADMIN — MIDODRINE HYDROCHLORIDE 30 MILLIGRAM(S): 2.5 TABLET ORAL at 05:26

## 2018-01-31 RX ADMIN — MIDODRINE HYDROCHLORIDE 30 MILLIGRAM(S): 2.5 TABLET ORAL at 12:23

## 2018-01-31 RX ADMIN — AMIODARONE HYDROCHLORIDE 100 MILLIGRAM(S): 400 TABLET ORAL at 05:26

## 2018-01-31 RX ADMIN — Medication 75 MICROGRAM(S): at 05:26

## 2018-01-31 NOTE — PROGRESS NOTE ADULT - PROBLEM SELECTOR PLAN 7
- HgbA1c 6.3 in oct, HgbA1c 6.5 in this visit. Follow up as an outpatient.   - Renal diet with consistent carbs.
- HgbA1c 6.3 in oct, HgbA1c 6.5 in this visit. Follow up as an outpatient.   - Renal diet with consistent carbs.
HR controlled  1/30: on Amiodarone: may need to dc if his oxygen requirement increases:
- HgbA1c 6.3 in oct, will repeat with am labs.   - Renal diet with consistent carbs.
HR controlled  1/30: on Amiodarone: may need to dc if his oxygen requirement increases:
- HgbA1c 6.3 in oct, HgbA1c 6.5 in this visit. Follow up as an outpatient.   - Renal diet with consistent carbs.
- HgbA1c 6.3 in oct, will repeat with am labs.   - Renal diet with consistent carbs.
HR controlled
- HgbA1c 6.3 in oct, HgbA1c 6.5 in this visit. Follow up as an outpatient.   - Renal diet with consistent carbs.

## 2018-01-31 NOTE — PROGRESS NOTE ADULT - PROBLEM SELECTOR PROBLEM 3
Acute on chronic systolic congestive heart failure
ESRD (end stage renal disease) on dialysis
Acute on chronic systolic congestive heart failure
Acute on chronic systolic congestive heart failure
Diarrhea
Acute on chronic systolic congestive heart failure
Diarrhea
ESRD (end stage renal disease) on dialysis
Diarrhea
ESRD (end stage renal disease) on dialysis
ESRD (end stage renal disease) on dialysis

## 2018-01-31 NOTE — PROGRESS NOTE ADULT - PROBLEM SELECTOR PROBLEM 6
AF (atrial fibrillation)
AF (atrial fibrillation)
Heart failure
AF (atrial fibrillation)
Heart failure
AF (atrial fibrillation)
Heart failure
AF (atrial fibrillation)

## 2018-01-31 NOTE — PROGRESS NOTE ADULT - SUBJECTIVE AND OBJECTIVE BOX
Patient is a 62y old  Male who presents with a chief complaint of Diarrhea, hypotension (25 Jan 2018 18:10)      Any change in ROS: Getting dialysis    MEDICATIONS  (STANDING):  amiodarone    Tablet 100 milliGRAM(s) Oral daily  aspirin enteric coated 81 milliGRAM(s) Oral daily  atorvastatin 80 milliGRAM(s) Oral at bedtime  buDESOnide  80 MICROgram(s)/formoterol 4.5 MICROgram(s) Inhaler 2 Puff(s) Inhalation two times a day  DOBUTamine Infusion 10.019 MICROgram(s)/kG/Min (10.4 mL/Hr) IV Continuous <Continuous>  docusate sodium 100 milliGRAM(s) Oral two times a day  finasteride 5 milliGRAM(s) Oral daily  levothyroxine 75 MICROGram(s) Oral daily  midodrine 30 milliGRAM(s) Oral every 8 hours  senna 2 Tablet(s) Oral at bedtime  sodium chloride 0.9% lock flush 20 milliLiter(s) IV Push once    MEDICATIONS  (PRN):  ALBUTerol/ipratropium for Nebulization 3 milliLiter(s) Nebulizer every 6 hours PRN Shortness of Breath and/or Wheezing  benzocaine 15 mG/menthol 3.6 mG Lozenge 1 Lozenge Oral every 6 hours PRN Sore Throat  HYDROcodone/homatropine Syrup 5 milliLiter(s) Oral every 6 hours PRN Cough  polyethylene glycol 3350 17 Gram(s) Oral daily PRN Constipation  sodium chloride 0.9% lock flush 10 milliLiter(s) IV Push every 1 hour PRN After each medication administration    Vital Signs Last 24 Hrs  T(C): 36.4 (31 Jan 2018 07:20), Max: 36.7 (30 Jan 2018 13:00)  T(F): 97.5 (31 Jan 2018 07:20), Max: 98 (30 Jan 2018 13:00)  HR: 77 (31 Jan 2018 07:20) (73 - 81)  BP: 97/61 (31 Jan 2018 07:20) (79/49 - 101/58)  BP(mean): --  RR: 18 (31 Jan 2018 07:20) (17 - 18)  SpO2: 96% (31 Jan 2018 05:21) (93% - 98%)    I&O's Summary    30 Jan 2018 07:01  -  31 Jan 2018 07:00  --------------------------------------------------------  IN: 754.4 mL / OUT: 0 mL / NET: 754.4 mL          Physical Exam:   GENERAL: NAD, well-groomed, well-developed  HEENT: BELL/   Atraumatic, Normocephalic  ENMT: No tonsillar erythema, exudates, or enlargement; Moist mucous membranes, Good dentition, No lesions  NECK: Supple, No JVD, Normal thyroid  CHEST/LUNG: Crackles bilaterally ++   CVS: Regular rate and rhythm; No murmurs, rubs, or gallops  GI: : Soft, Nontender, Nondistended; Bowel sounds present  NERVOUS SYSTEM:  Alert & Oriented X3  EXTREMITIES:  2+ Peripheral Pulses, No clubbing, cyanosis, or edema  LYMPH: No lymphadenopathy noted  SKIN: No rashes or lesions  ENDOCRINOLOGY: No Thyromegaly  PSYCH: Appropriate    Labs:                              11.3   4.98  )-----------( 108      ( 31 Jan 2018 07:15 )             36.3                         12.1   5.33  )-----------( 96       ( 30 Jan 2018 06:30 )             38.2                         11.8   5.29  )-----------( 109      ( 29 Jan 2018 09:23 )             36.2                         12.0   4.75  )-----------( 107      ( 28 Jan 2018 03:53 )             37.5     01-31    131<L>  |  91<L>  |  43<H>  ----------------------------<  137<H>  4.2   |  25  |  7.24<H>  01-30    132<L>  |  92<L>  |  32<H>  ----------------------------<  102<H>  4.5   |  25  |  5.69<H>  01-29    135  |  95<L>  |  59<H>  ----------------------------<  123<H>  4.9   |  24  |  8.62<H>  01-28    132<L>  |  93<L>  |  41<H>  ----------------------------<  139<H>  4.3   |  25  |  6.68<H>    Ca    8.2<L>      31 Jan 2018 07:15  Ca    8.3<L>      30 Jan 2018 06:30  Phos  5.1     01-31  Phos  4.2     01-30  Mg     1.9     01-31  Mg     1.9     01-30      CAPILLARY BLOOD GLUCOSE            PT/INR - ( 31 Jan 2018 07:15 )   PT: 22.5 SEC;   INR: 1.93          PTT - ( 30 Jan 2018 06:30 )  PTT:44.1 SEC    Cultures:       < from: Xray Chest 1 View AP- PORTABLE-Urgent (01.27.18 @ 08:35) >      INTERPRETATION:  EXAMINATION: XR CHEST PORTABLE URGENT 1V    CLINICAL INDICATION: Shortness of Breath    TECHNIQUE: Frontal radiograph of the chest was obtained.    COMPARISON: 1/23/2018.    FINDINGS:     Cardiac silhouette not well evaluated. Left-sided defibrillator.   Right-sided central venous catheter with tip overlying SVC. Bilateral   reticular opacities similar to prior studies. No pleural effusion or   pneumothorax.    IMPRESSION:   No change from prior study.                  ALIX HESTER M.D., ATTENDING RADIOLOGIST  This document has been electronically signed. Jan 28 2018  1:01PM        < end of copied text >                        Studies  Chest X-RAY  CT SCAN Chest   Venous Dopplers: LE:   CT Abdomen  Others

## 2018-01-31 NOTE — PROGRESS NOTE ADULT - PROBLEM SELECTOR PROBLEM 7
DM (diabetes mellitus)
DM (diabetes mellitus)
AF (atrial fibrillation)
DM (diabetes mellitus)
AF (atrial fibrillation)
AF (atrial fibrillation)
DM (diabetes mellitus)

## 2018-01-31 NOTE — PROGRESS NOTE ADULT - PROBLEM SELECTOR PROBLEM 8
Chronic hypotension
Chronic hypotension
DM (diabetes mellitus)
Chronic hypotension
DM (diabetes mellitus)
Chronic hypotension
DM (diabetes mellitus)
Chronic hypotension

## 2018-01-31 NOTE — PROGRESS NOTE ADULT - PROVIDER SPECIALTY LIST ADULT
Cardiology
Internal Medicine
Nephrology
Pulmonology
Nephrology
Internal Medicine
Pulmonology
Internal Medicine
Pulmonology
Internal Medicine

## 2018-01-31 NOTE — PROGRESS NOTE ADULT - PROBLEM SELECTOR PROBLEM 4
COPD (chronic obstructive pulmonary disease)
Heart failure
COPD (chronic obstructive pulmonary disease)
COPD (chronic obstructive pulmonary disease)
Heart failure

## 2018-01-31 NOTE — PROGRESS NOTE ADULT - PROBLEM SELECTOR PLAN 1
still with cough but no wheezing: Symptomatic treatment:: for now  1/30: Remains without Wheezing  1/31: doing ok: no wheezing still : Cont supportive treatment.

## 2018-01-31 NOTE — PROGRESS NOTE ADULT - SUBJECTIVE AND OBJECTIVE BOX
Patient is a 62y old  Male who presents with a chief complaint of Diarrhea, hypotension (25 Jan 2018 18:10)    SUBJECTIVE / OVERNIGHT EVENTS:  No acute overnight events. Patient endorsed improved cough and shortness of breath. Patient denied fever, chills, nightsweats, , nausea, vomiting, diarrhea, constipation, chest pain, abdominal pain, focal weakness.     MEDICATIONS  (STANDING):  amiodarone    Tablet 100 milliGRAM(s) Oral daily  aspirin enteric coated 81 milliGRAM(s) Oral daily  atorvastatin 80 milliGRAM(s) Oral at bedtime  buDESOnide  80 MICROgram(s)/formoterol 4.5 MICROgram(s) Inhaler 2 Puff(s) Inhalation two times a day  DOBUTamine Infusion 10.019 MICROgram(s)/kG/Min (10.4 mL/Hr) IV Continuous <Continuous>  docusate sodium 100 milliGRAM(s) Oral two times a day  finasteride 5 milliGRAM(s) Oral daily  levothyroxine 75 MICROGram(s) Oral daily  midodrine 30 milliGRAM(s) Oral every 8 hours  senna 2 Tablet(s) Oral at bedtime  sodium chloride 0.9% lock flush 20 milliLiter(s) IV Push once    MEDICATIONS  (PRN):  ALBUTerol/ipratropium for Nebulization 3 milliLiter(s) Nebulizer every 6 hours PRN Shortness of Breath and/or Wheezing  benzocaine 15 mG/menthol 3.6 mG Lozenge 1 Lozenge Oral every 6 hours PRN Sore Throat  HYDROcodone/homatropine Syrup 5 milliLiter(s) Oral every 6 hours PRN Cough  polyethylene glycol 3350 17 Gram(s) Oral daily PRN Constipation  sodium chloride 0.9% lock flush 10 milliLiter(s) IV Push every 1 hour PRN After each medication administration    Vital Signs Last 24 Hrs  T(C): 36.3 (31 Jan 2018 05:21), Max: 36.7 (30 Jan 2018 09:00)  T(F): 97.4 (31 Jan 2018 05:21), Max: 98 (30 Jan 2018 09:00)  HR: 81 (31 Jan 2018 05:21) (70 - 84)  BP: 99/63 (31 Jan 2018 05:21) (79/49 - 101/58)  RR: 17 (31 Jan 2018 05:21) (17 - 18)  SpO2: 96% (31 Jan 2018 05:21) (93% - 98%)  CAPILLARY BLOOD GLUCOSE    I&O's Summary    30 Jan 2018 07:01  -  31 Jan 2018 07:00  --------------------------------------------------------  IN: 723.2 mL / OUT: 0 mL / NET: 723.2 mL    PHYSICAL EXAM:  GENERAL: NAD, well-developed  HEAD:  Atraumatic, Normocephalic  EYES: EOMI, conjunctiva and sclera clear  NECK: Supple, No JVD  CHEST/LUNG: Mostly clear to auscultation bilaterally; soft crackles   HEART: irregular rhythm; Normal S1 S2, No murmurs, rubs, or gallops  ABDOMEN: Soft, Nontender, Nondistended; Bowel sounds present  EXTREMITIES:  2+ Peripheral Pulses, No clubbing, cyanosis, or edema  PSYCH: AAOx3  NEUROLOGY: non-focal  SKIN: Left arm picc line in place, no signs of infection.     LABS:             12.1   5.33  )-----------( 96       ( 30 Jan 2018 06:30 )             38.2     01-30  132<L>  |  92<L>  |  32<H>  ----------------------------<  102<H>  4.5   |  25  |  5.69<H>    Ca    8.3<L>      30 Jan 2018 06:30  Phos  4.2     01-30  Mg     1.9     01-30    PT/INR - ( 30 Jan 2018 06:30 )   PT: 19.2 SEC;   INR: 1.71      PTT - ( 30 Jan 2018 06:30 )  PTT:44.1 SEC    New labs pending.     RADIOLOGY & ADDITIONAL TESTS: No new imaging.     Imaging Personally Reviewed:    Consultant(s) Notes Reviewed:  Cardiology, Pulmonology, Nephrology    Care Discussed with Consultants/Other Providers: Cardiology Patient is a 62y old  Male who presents with a chief complaint of Diarrhea, hypotension (25 Jan 2018 18:10)    SUBJECTIVE / OVERNIGHT EVENTS:  No acute overnight events. Patient endorsed improved cough and shortness of breath. Patient denied fever, chills, nightsweats, , nausea, vomiting, diarrhea, constipation, chest pain, abdominal pain, focal weakness.     MEDICATIONS  (STANDING):  amiodarone    Tablet 100 milliGRAM(s) Oral daily  aspirin enteric coated 81 milliGRAM(s) Oral daily  atorvastatin 80 milliGRAM(s) Oral at bedtime  buDESOnide  80 MICROgram(s)/formoterol 4.5 MICROgram(s) Inhaler 2 Puff(s) Inhalation two times a day  DOBUTamine Infusion 10.019 MICROgram(s)/kG/Min (10.4 mL/Hr) IV Continuous <Continuous>  docusate sodium 100 milliGRAM(s) Oral two times a day  finasteride 5 milliGRAM(s) Oral daily  levothyroxine 75 MICROGram(s) Oral daily  midodrine 30 milliGRAM(s) Oral every 8 hours  senna 2 Tablet(s) Oral at bedtime  sodium chloride 0.9% lock flush 20 milliLiter(s) IV Push once    MEDICATIONS  (PRN):  ALBUTerol/ipratropium for Nebulization 3 milliLiter(s) Nebulizer every 6 hours PRN Shortness of Breath and/or Wheezing  benzocaine 15 mG/menthol 3.6 mG Lozenge 1 Lozenge Oral every 6 hours PRN Sore Throat  HYDROcodone/homatropine Syrup 5 milliLiter(s) Oral every 6 hours PRN Cough  polyethylene glycol 3350 17 Gram(s) Oral daily PRN Constipation  sodium chloride 0.9% lock flush 10 milliLiter(s) IV Push every 1 hour PRN After each medication administration    Vital Signs Last 24 Hrs  T(C): 36.3 (31 Jan 2018 05:21), Max: 36.7 (30 Jan 2018 09:00)  T(F): 97.4 (31 Jan 2018 05:21), Max: 98 (30 Jan 2018 09:00)  HR: 81 (31 Jan 2018 05:21) (70 - 84)  BP: 99/63 (31 Jan 2018 05:21) (79/49 - 101/58)  RR: 17 (31 Jan 2018 05:21) (17 - 18)  SpO2: 96% (31 Jan 2018 05:21) (93% - 98%)  CAPILLARY BLOOD GLUCOSE    I&O's Summary    30 Jan 2018 07:01  -  31 Jan 2018 07:00  --------------------------------------------------------  IN: 723.2 mL / OUT: 0 mL / NET: 723.2 mL    PHYSICAL EXAM:  GENERAL: NAD, well-developed  HEAD:  Atraumatic, Normocephalic  EYES: EOMI, conjunctiva and sclera clear  NECK: Supple, No JVD  CHEST/LUNG: Mostly clear to auscultation bilaterally; soft crackles   HEART: irregular rhythm; Normal S1 S2, No murmurs, rubs, or gallops  ABDOMEN: Soft, Nontender, Nondistended; Bowel sounds present  EXTREMITIES:  2+ Peripheral Pulses, No clubbing, cyanosis, or edema  PSYCH: AAOx3  NEUROLOGY: non-focal  SKIN: Left arm picc line in place, no signs of infection.     LABS:             12.1   5.33  )-----------( 96       ( 30 Jan 2018 06:30 )             38.2     01-30  132<L>  |  92<L>  |  32<H>  ----------------------------<  102<H>  4.5   |  25  |  5.69<H>    Ca    8.3<L>      30 Jan 2018 06:30  Phos  4.2     01-30  Mg     1.9     01-30    PT/INR - ( 30 Jan 2018 06:30 )   PT: 19.2 SEC;   INR: 1.71      PTT - ( 30 Jan 2018 06:30 )  PTT:44.1 SEC    .  Labs reviewed personally.                          11.3   4.98  )-----------( 108      ( 31 Jan 2018 07:15 )             36.3     Hgb Trend: 11.3<--, 12.1<--, 11.8<--, 12.0<--, 12.0<--  01-31    131<L>  |  91<L>  |  43<H>  ----------------------------<  137<H>  4.2   |  25  |  7.24<H>    Ca    8.2<L>      31 Jan 2018 07:15  Phos  5.1     01-31  Mg     1.9     01-31      Creatinine Trend: 7.24<--, 5.69<--, 8.62<--, 6.68<--, 5.00<--, 5.95<--  PT/INR - ( 31 Jan 2018 07:15 )   PT: 22.5 SEC;   INR: 1.93          PTT - ( 30 Jan 2018 06:30 )  PTT:44.1 SEC      Imaging reviewed personally.        RADIOLOGY & ADDITIONAL TESTS: No new imaging.     Imaging Personally Reviewed:    Consultant(s) Notes Reviewed:  Cardiology, Pulmonology, Nephrology    Care Discussed with Consultants/Other Providers: Cardiology

## 2018-01-31 NOTE — PROGRESS NOTE ADULT - ASSESSMENT
62yM w/pmhx ESRD (HD MWF), end stage NICM (EF ~10%), ICD, s/p ICD on home dobutamine infusion via PICC line, afib on coumadin, COPD and ILD on home O2 (4-5L), hypotension on midodrine (SBP baseline 80s-90s) (follows with Dr. Davis) who presents for diarrhea and hypotension to 70s, found to have human metapneumovirus. Patient with 3 watery diarrhea bowel movement at home. No episodes of diarrhea in hospital. Patient with supra-therapeutic INR on admission likely due to diarrhea, now subtherapeutic. Dosing coumadin 3mg.

## 2018-01-31 NOTE — PROGRESS NOTE ADULT - PROBLEM SELECTOR PROBLEM 5
COPD (chronic obstructive pulmonary disease)
COPD (chronic obstructive pulmonary disease)
ESRD (end stage renal disease) on dialysis
COPD (chronic obstructive pulmonary disease)
ESRD (end stage renal disease) on dialysis
COPD (chronic obstructive pulmonary disease)
ESRD (end stage renal disease) on dialysis
COPD (chronic obstructive pulmonary disease)

## 2018-01-31 NOTE — PROGRESS NOTE ADULT - PROBLEM SELECTOR PLAN 5
- Patient on 4 liters of oxygen at home. No wheezes on exam. Bibasilar crackles.  - c/w  budosenide -formoterol, Duonebs q6 hr  - Pulmonary fibrosis seen on last CT scan, could be side effect of amiodarone, will decrease amio dose to 100mg as per cards. Pulm recs appreciated.   - Should get PFT and DLCO as an outpatient to eval pulmonary fibrosis

## 2018-01-31 NOTE — PROGRESS NOTE ADULT - ATTENDING COMMENTS
Patient seen and examined by me s/p HD with removal of 1.5L. Pt with dyspnea on exertion with minimal activity and intermittent SOB likely due to end stage HF and viral URI. SOB improves with nebulizer treatments. However, pt does not have nebulizer at home. No complaints currently aside from cough, which is improving. Lung exam notable for R lung base crackles. Remainder of exam unremarkable.    Patient is a 61 yo man with medical hx significant for ESRD (HD MWF), end stage chronic systolic HF due to NICM (EF 21%) s/p ICD on home dobutamine infusion via PICC, afib on coumadin, COPD and ILD on home O2 (4-5L) admitted for hypotension, found to have human metapneumovirus and malfunctioning PICC.  S/p new PICC placement on 1/29. Clinically stable.  - PRN albuterol neb treatments  - INR subtherapeutic, but levels increasing. Titrate coumadin accordingly for goal INR 2.0-3.0. Continue coumadin 3mg tonight  - f/u pulm recs.  - continue amiodarone 100mg daily as per cardiology recs  - HD as per renal  - supportive care for cough.    DISPO: Plan for d/c today with close outpatient f/u.

## 2018-01-31 NOTE — PROGRESS NOTE ADULT - PROBLEM SELECTOR PLAN 2
pt has had extensive pulmonary fibrosis: Per pts sister: genny I had spoken to her in 2017: he did have lung biopsy : but he was not on any specific treatment: He is on 4 L of oxygen at home: per prior notes. Spoke to his wife : he does have pulmonary fibrosis : he did have biopsy in 2016: he was told he has ILD and the treatment they gave her did not work; including prednisone: He iso n oxygen at home: And he does have COPD too: Given his extensive pulmonary history and extensive pulmonary fibrosis: he is not a good candidate for amiodarone: Not much reserve in him left:  1/30 As above:  1/31: cont current treatmetn

## 2018-01-31 NOTE — PROGRESS NOTE ADULT - PROBLEM SELECTOR PLAN 9
- continue with Finasteride

## 2018-01-31 NOTE — PROGRESS NOTE ADULT - PROBLEM SELECTOR PLAN 3
defer to primary care:  1/30: Resolved
inc UF w/hd as tolerated  UF goal usually limited by hypotension
- Gets dialysis in [facility]   - Nephrology recs appreciated. Continue with dialysis schedule.   - Renal diet ordered   - Continue to monitor on BMP  - Strict I/Os
defer to primary care:  1/30: Resolved
inc UF w/hd as tolerated  UF goal usually limited by hypotension
inc UF w/hd as tolerated  UF goal usually limited by hypotension
- Gets dialysis in [facility]   - Nephrology recs appreciated. Continue with dialysis schedule.   - Renal diet ordered   - Continue to monitor on BMP  - Strict I/Os
- Gets dialysis in [facility]   - Nephrology recs appreciated. Continue with dialysis schedule.   - Renal diet ordered   - Continue to monitor on BMP  - Strict I/Os
- Gets dialysis in [facility]   - Nephrology recs appreciated. Continue with dialysis schedule.   - Renal diet ordered   - Continue to monitor on BMP  - Strict I/Os
- Gets dialysis in [facility]   - Nephrology recs appreciated. Continue with dialysis schedule.   - Renal diet ordered   - Strict I/Os
defer to primary care:
inc UF w/hd as tolerated  UF goal usually limited by hypotension
- Gets dialysis in [facility]   - Nephrology recs appreciated. Continue with dialysis schedule.   - Renal diet ordered   - Continue to monitor on BMP  - Strict I/Os
- Gets dialysis in [facility]   - Nephrology recs appreciated. Continue with dialysis schedule.   - Renal diet ordered   - Continue to monitor on BMP  - Strict I/Os
- Gets dialysis in [facility]   - Nephrology recs appreciated. Continue with dialysis schedule.   - Renal diet ordered   - Continue to monitor on BMP  - Strict I/Os

## 2018-01-31 NOTE — PROGRESS NOTE ADULT - PROBLEM SELECTOR PROBLEM 9
Benign prostatic hyperplasia

## 2018-01-31 NOTE — PROGRESS NOTE ADULT - PROBLEM SELECTOR PROBLEM 1
ESRD (end stage renal disease)
ESRD (end stage renal disease)
Human metapneumovirus (hMPV) as cause of disease classified elsewhere
ESRD (end stage renal disease)
ESRD (end stage renal disease) on dialysis
Human metapneumovirus (hMPV) as cause of disease classified elsewhere
ESRD (end stage renal disease)
Human metapneumovirus (hMPV) as cause of disease classified elsewhere

## 2018-01-31 NOTE — PROGRESS NOTE ADULT - PROBLEM SELECTOR PROBLEM 2
Hypotension
Hypotension
Pulmonary fibrosis
Diarrhea
Hypotension
Pulmonary fibrosis
Diarrhea
Hypotension
Diarrhea
Diarrhea
Pulmonary fibrosis
Diarrhea

## 2018-01-31 NOTE — PROGRESS NOTE ADULT - PROBLEM SELECTOR PLAN 1
Electrolytes acceptable. chronic vol overload  HD today, uneventful treatment. maximize UF goal, as BP tolerates.   c/w renal diet, fluid restriction  Cont midodrine preHD.

## 2018-01-31 NOTE — PROGRESS NOTE ADULT - PROBLEM SELECTOR PLAN 4
- patient with EF less than 10% as per Dr. Davis (cardiologist), recs apprecaited. Continue with amiodarone 100mg PO daily.   - Patient on fixed dose dobutamine gtt at home, patient rate currently 10.4 mL/hr of concentration 1000 mg/250 mL, which is 10 mcg/kg/min based on patient's current weight. Per cardiology, continue current rate.   - New picc line placed on Left arm placed 1/29/18

## 2018-01-31 NOTE — PROGRESS NOTE ADULT - PROBLEM SELECTOR PLAN 1
- on NC 4L, goal of sat >92%   -  Symptomatic treatment with duonebs q6 hr, c/w home budesonide and formoterol   - Hycodan for cough   -  Isolation Contact precautions

## 2018-01-31 NOTE — PROGRESS NOTE ADULT - PROBLEM SELECTOR PLAN 8
- Continue with Midodrine 30mg q8   - Vitals q4
- Continue with Midodrine 30mg q8   - Vitals q4
Defer to primary team!
- Continue with Midodrine 30mg q8   - Vitals q4
Defer to primary team!
- Continue with Midodrine 30mg q8   - Vitals q4

## 2018-01-31 NOTE — PROGRESS NOTE ADULT - SUBJECTIVE AND OBJECTIVE BOX
Pt seen and examined during HD.   Nearly finished with HD, tolerated nearly 1.5kg UF.   NO acute events overnight.     Allergies:  No Known Allergies    Hospital Medications:   MEDICATIONS  (STANDING):  amiodarone    Tablet 100 milliGRAM(s) Oral daily  aspirin enteric coated 81 milliGRAM(s) Oral daily  atorvastatin 80 milliGRAM(s) Oral at bedtime  buDESOnide  80 MICROgram(s)/formoterol 4.5 MICROgram(s) Inhaler 2 Puff(s) Inhalation two times a day  DOBUTamine Infusion 10.019 MICROgram(s)/kG/Min (10.4 mL/Hr) IV Continuous <Continuous>  docusate sodium 100 milliGRAM(s) Oral two times a day  finasteride 5 milliGRAM(s) Oral daily  levothyroxine 75 MICROGram(s) Oral daily  midodrine 30 milliGRAM(s) Oral every 8 hours  senna 2 Tablet(s) Oral at bedtime  sodium chloride 0.9% lock flush 20 milliLiter(s) IV Push once    VITALS:  T(F): 97.4 (01-31-18 @ 12:22), Max: 97.8 (01-30-18 @ 21:29)  HR: 64 (01-31-18 @ 12:22)  BP: 89/55 (01-31-18 @ 12:22)  RR: 18 (01-31-18 @ 12:22)  SpO2: 96% (01-31-18 @ 12:22)  Wt(kg): --    01-30 @ 07:01  -  01-31 @ 07:00  --------------------------------------------------------  IN: 754.4 mL / OUT: 0 mL / NET: 754.4 mL    PHYSICAL EXAM:  Constitutional: NAD  HEENT: anicteric sclera, oropharynx clear, MMM  Neck: No JVD  Respiratory: CTAB, no wheezes, rales or rhonchi  Cardiovascular: S1, S2, RRR  Gastrointestinal: BS+, soft, NT/ND  Extremities: No cyanosis or clubbing. No peripheral edema  Neurological: A/O x 3, no focal deficits  Psychiatric: Normal mood, normal affect  : No CVA tenderness. No rosa.   Skin: No rashes  Vascular Access: RIJ tunneled cath     LABS:  01-31    131<L>  |  91<L>  |  43<H>  ----------------------------<  137<H>  4.2   |  25  |  7.24<H>    Ca    8.2<L>      31 Jan 2018 07:15  Phos  5.1     01-31  Mg     1.9     01-31      Creatinine Trend: 7.24 <--, 5.69 <--, 8.62 <--, 6.68 <--, 5.00 <--, 5.95 <--, 4.62 <--                        11.3   4.98  )-----------( 108      ( 31 Jan 2018 07:15 )             36.3     Urine Studies:      RADIOLOGY & ADDITIONAL STUDIES:

## 2018-02-04 NOTE — PATIENT PROFILE ADULT. - NS PRO TALK SOMEONE YN
Problem: Patient Care Overview  Goal: Plan of Care/Patient Progress Review  Outcome: Improving  Neuro: A&Ox4. Tmax of 100.5, relived by tylenol.   Cardiac: SR-Sinus tachycardia, HR 's. -130's.                    Respiratory: Sating >96% on 1 lpm via NC. Clear and diminished throughout. Infrequent/nonproductive cough.   GI/: Adequate urine output. 1 large BM, diarrhea.    Diet/appetite: Regular diet.   Activity:  Independent.  Pain: Denies.   Skin: Intact, no new deficits noted.  LDA's: L port a cath,  ml/hr.      Plan: Pt unable to obtain sputum sample. Sepsis triggered, lactic acid resulted in 0.8.  Continue with POC. Notify primary team with changes.       no

## 2018-02-13 ENCOUNTER — INPATIENT (INPATIENT)
Facility: HOSPITAL | Age: 63
LOS: 37 days | Discharge: HOME HEALTH | End: 2018-03-23
Attending: HOSPITALIST | Admitting: HOSPITALIST
Payer: COMMERCIAL

## 2018-02-13 VITALS
SYSTOLIC BLOOD PRESSURE: 100 MMHG | RESPIRATION RATE: 28 BRPM | DIASTOLIC BLOOD PRESSURE: 50 MMHG | HEART RATE: 95 BPM | OXYGEN SATURATION: 97 %

## 2018-02-13 DIAGNOSIS — E11.9 TYPE 2 DIABETES MELLITUS WITHOUT COMPLICATIONS: ICD-10-CM

## 2018-02-13 DIAGNOSIS — I95.89 OTHER HYPOTENSION: ICD-10-CM

## 2018-02-13 DIAGNOSIS — R06.02 SHORTNESS OF BREATH: ICD-10-CM

## 2018-02-13 DIAGNOSIS — N18.6 END STAGE RENAL DISEASE: ICD-10-CM

## 2018-02-13 DIAGNOSIS — I50.22 CHRONIC SYSTOLIC (CONGESTIVE) HEART FAILURE: ICD-10-CM

## 2018-02-13 DIAGNOSIS — Z29.9 ENCOUNTER FOR PROPHYLACTIC MEASURES, UNSPECIFIED: ICD-10-CM

## 2018-02-13 DIAGNOSIS — I48.91 UNSPECIFIED ATRIAL FIBRILLATION: ICD-10-CM

## 2018-02-13 DIAGNOSIS — J84.10 PULMONARY FIBROSIS, UNSPECIFIED: ICD-10-CM

## 2018-02-13 DIAGNOSIS — J10.1 INFLUENZA DUE TO OTHER IDENTIFIED INFLUENZA VIRUS WITH OTHER RESPIRATORY MANIFESTATIONS: ICD-10-CM

## 2018-02-13 DIAGNOSIS — J44.9 CHRONIC OBSTRUCTIVE PULMONARY DISEASE, UNSPECIFIED: ICD-10-CM

## 2018-02-13 DIAGNOSIS — R74.8 ABNORMAL LEVELS OF OTHER SERUM ENZYMES: ICD-10-CM

## 2018-02-13 LAB
ALBUMIN SERPL ELPH-MCNC: 3.3 G/DL — SIGNIFICANT CHANGE UP (ref 3.3–5)
ALP SERPL-CCNC: 233 U/L — HIGH (ref 40–120)
ALT FLD-CCNC: 52 U/L — HIGH (ref 4–41)
ANISOCYTOSIS BLD QL: SIGNIFICANT CHANGE UP
APTT BLD: 46.4 SEC — HIGH (ref 27.5–37.4)
AST SERPL-CCNC: 80 U/L — HIGH (ref 4–40)
B PERT DNA SPEC QL NAA+PROBE: SIGNIFICANT CHANGE UP
BASE EXCESS BLDV CALC-SCNC: 4.4 MMOL/L — SIGNIFICANT CHANGE UP
BASOPHILS # BLD AUTO: 0.03 K/UL — SIGNIFICANT CHANGE UP (ref 0–0.2)
BASOPHILS NFR BLD AUTO: 0.5 % — SIGNIFICANT CHANGE UP (ref 0–2)
BASOPHILS NFR SPEC: 0.9 % — SIGNIFICANT CHANGE UP (ref 0–2)
BILIRUB SERPL-MCNC: 0.7 MG/DL — SIGNIFICANT CHANGE UP (ref 0.2–1.2)
BLASTS # FLD: 0 % — SIGNIFICANT CHANGE UP (ref 0–0)
BLOOD GAS VENOUS - CREATININE: 5.92 MG/DL — HIGH (ref 0.5–1.3)
BUN SERPL-MCNC: 35 MG/DL — HIGH (ref 7–23)
C PNEUM DNA SPEC QL NAA+PROBE: NOT DETECTED — SIGNIFICANT CHANGE UP
CALCIUM SERPL-MCNC: 8.7 MG/DL — SIGNIFICANT CHANGE UP (ref 8.4–10.5)
CHLORIDE BLDV-SCNC: 102 MMOL/L — SIGNIFICANT CHANGE UP (ref 96–108)
CHLORIDE SERPL-SCNC: 95 MMOL/L — LOW (ref 98–107)
CK MB BLD-MCNC: 8.79 NG/ML — HIGH (ref 1–6.6)
CK SERPL-CCNC: 575 U/L — HIGH (ref 30–200)
CO2 SERPL-SCNC: 27 MMOL/L — SIGNIFICANT CHANGE UP (ref 22–31)
CREAT SERPL-MCNC: 5.96 MG/DL — HIGH (ref 0.5–1.3)
EOSINOPHIL # BLD AUTO: 0.13 K/UL — SIGNIFICANT CHANGE UP (ref 0–0.5)
EOSINOPHIL NFR BLD AUTO: 2 % — SIGNIFICANT CHANGE UP (ref 0–6)
EOSINOPHIL NFR FLD: 0 % — SIGNIFICANT CHANGE UP (ref 0–6)
FLUAV H1 2009 PAND RNA SPEC QL NAA+PROBE: NOT DETECTED — SIGNIFICANT CHANGE UP
FLUAV H1 RNA SPEC QL NAA+PROBE: NOT DETECTED — SIGNIFICANT CHANGE UP
FLUAV H3 RNA SPEC QL NAA+PROBE: NOT DETECTED — SIGNIFICANT CHANGE UP
FLUAV SUBTYP SPEC NAA+PROBE: SIGNIFICANT CHANGE UP
FLUBV RNA SPEC QL NAA+PROBE: POSITIVE — HIGH
GAS PNL BLDV: 136 MMOL/L — SIGNIFICANT CHANGE UP (ref 136–146)
GIANT PLATELETS BLD QL SMEAR: PRESENT — SIGNIFICANT CHANGE UP
GLUCOSE BLDV-MCNC: 154 — HIGH (ref 70–99)
GLUCOSE SERPL-MCNC: 141 MG/DL — HIGH (ref 70–99)
HADV DNA SPEC QL NAA+PROBE: NOT DETECTED — SIGNIFICANT CHANGE UP
HCO3 BLDV-SCNC: 26 MMOL/L — SIGNIFICANT CHANGE UP (ref 20–27)
HCOV 229E RNA SPEC QL NAA+PROBE: NOT DETECTED — SIGNIFICANT CHANGE UP
HCOV HKU1 RNA SPEC QL NAA+PROBE: NOT DETECTED — SIGNIFICANT CHANGE UP
HCOV NL63 RNA SPEC QL NAA+PROBE: NOT DETECTED — SIGNIFICANT CHANGE UP
HCOV OC43 RNA SPEC QL NAA+PROBE: NOT DETECTED — SIGNIFICANT CHANGE UP
HCT VFR BLD CALC: 40.8 % — SIGNIFICANT CHANGE UP (ref 39–50)
HCT VFR BLDV CALC: 39.3 % — SIGNIFICANT CHANGE UP (ref 39–51)
HGB BLD-MCNC: 12.7 G/DL — LOW (ref 13–17)
HGB BLDV-MCNC: 12.8 G/DL — LOW (ref 13–17)
HMPV RNA SPEC QL NAA+PROBE: NOT DETECTED — SIGNIFICANT CHANGE UP
HPIV1 RNA SPEC QL NAA+PROBE: NOT DETECTED — SIGNIFICANT CHANGE UP
HPIV2 RNA SPEC QL NAA+PROBE: NOT DETECTED — SIGNIFICANT CHANGE UP
HPIV3 RNA SPEC QL NAA+PROBE: NOT DETECTED — SIGNIFICANT CHANGE UP
HPIV4 RNA SPEC QL NAA+PROBE: NOT DETECTED — SIGNIFICANT CHANGE UP
HYPOCHROMIA BLD QL: SLIGHT — SIGNIFICANT CHANGE UP
IMM GRANULOCYTES # BLD AUTO: 0.01 # — SIGNIFICANT CHANGE UP
IMM GRANULOCYTES NFR BLD AUTO: 0.2 % — SIGNIFICANT CHANGE UP (ref 0–1.5)
INR BLD: 1.97 — HIGH (ref 0.88–1.17)
LACTATE BLDV-MCNC: 3.2 MMOL/L — HIGH (ref 0.5–2)
LYMPHOCYTES # BLD AUTO: 1.11 K/UL — SIGNIFICANT CHANGE UP (ref 1–3.3)
LYMPHOCYTES # BLD AUTO: 17.3 % — SIGNIFICANT CHANGE UP (ref 13–44)
LYMPHOCYTES NFR SPEC AUTO: 14.2 % — SIGNIFICANT CHANGE UP (ref 13–44)
M PNEUMO DNA SPEC QL NAA+PROBE: NOT DETECTED — SIGNIFICANT CHANGE UP
MACROCYTES BLD QL: SIGNIFICANT CHANGE UP
MCHC RBC-ENTMCNC: 31.1 % — LOW (ref 32–36)
MCHC RBC-ENTMCNC: 31.2 PG — SIGNIFICANT CHANGE UP (ref 27–34)
MCV RBC AUTO: 100.2 FL — HIGH (ref 80–100)
METAMYELOCYTES # FLD: 0 % — SIGNIFICANT CHANGE UP (ref 0–1)
MONOCYTES # BLD AUTO: 1.15 K/UL — HIGH (ref 0–0.9)
MONOCYTES NFR BLD AUTO: 17.9 % — HIGH (ref 2–14)
MONOCYTES NFR BLD: 17.7 % — HIGH (ref 2–9)
MYELOCYTES NFR BLD: 0 % — SIGNIFICANT CHANGE UP (ref 0–0)
NEUTROPHIL AB SER-ACNC: 67.2 % — SIGNIFICANT CHANGE UP (ref 43–77)
NEUTROPHILS # BLD AUTO: 3.99 K/UL — SIGNIFICANT CHANGE UP (ref 1.8–7.4)
NEUTROPHILS NFR BLD AUTO: 62.1 % — SIGNIFICANT CHANGE UP (ref 43–77)
NEUTS BAND # BLD: 0 % — SIGNIFICANT CHANGE UP (ref 0–6)
NRBC # FLD: 0 — SIGNIFICANT CHANGE UP
OTHER - HEMATOLOGY %: 0 — SIGNIFICANT CHANGE UP
PCO2 BLDV: 61 MMHG — HIGH (ref 41–51)
PH BLDV: 7.32 PH — SIGNIFICANT CHANGE UP (ref 7.32–7.43)
PLATELET # BLD AUTO: 110 K/UL — LOW (ref 150–400)
PLATELET COUNT - ESTIMATE: SIGNIFICANT CHANGE UP
PMV BLD: 13.6 FL — HIGH (ref 7–13)
PO2 BLDV: 26 MMHG — LOW (ref 35–40)
POTASSIUM BLDV-SCNC: 4.5 MMOL/L — SIGNIFICANT CHANGE UP (ref 3.4–4.5)
POTASSIUM SERPL-MCNC: 4.9 MMOL/L — SIGNIFICANT CHANGE UP (ref 3.5–5.3)
POTASSIUM SERPL-SCNC: 4.9 MMOL/L — SIGNIFICANT CHANGE UP (ref 3.5–5.3)
PROMYELOCYTES # FLD: 0 % — SIGNIFICANT CHANGE UP (ref 0–0)
PROT SERPL-MCNC: 8.6 G/DL — HIGH (ref 6–8.3)
PROTHROM AB SERPL-ACNC: 23 SEC — HIGH (ref 9.8–13.1)
RBC # BLD: 4.07 M/UL — LOW (ref 4.2–5.8)
RBC # FLD: 17.2 % — HIGH (ref 10.3–14.5)
REVIEW TO FOLLOW: YES — SIGNIFICANT CHANGE UP
RSV RNA SPEC QL NAA+PROBE: NOT DETECTED — SIGNIFICANT CHANGE UP
RV+EV RNA SPEC QL NAA+PROBE: NOT DETECTED — SIGNIFICANT CHANGE UP
SAO2 % BLDV: 33.5 % — LOW (ref 60–85)
SODIUM SERPL-SCNC: 137 MMOL/L — SIGNIFICANT CHANGE UP (ref 135–145)
TROPONIN T SERPL-MCNC: 0.76 NG/ML — HIGH (ref 0–0.06)
VARIANT LYMPHS # BLD: 0 % — SIGNIFICANT CHANGE UP
WBC # BLD: 6.42 K/UL — SIGNIFICANT CHANGE UP (ref 3.8–10.5)
WBC # FLD AUTO: 6.42 K/UL — SIGNIFICANT CHANGE UP (ref 3.8–10.5)

## 2018-02-13 PROCEDURE — 99223 1ST HOSP IP/OBS HIGH 75: CPT | Mod: GC

## 2018-02-13 PROCEDURE — 71045 X-RAY EXAM CHEST 1 VIEW: CPT | Mod: 26

## 2018-02-13 RX ORDER — GLUCAGON INJECTION, SOLUTION 0.5 MG/.1ML
1 INJECTION, SOLUTION SUBCUTANEOUS ONCE
Qty: 0 | Refills: 0 | Status: DISCONTINUED | OUTPATIENT
Start: 2018-02-13 | End: 2018-02-14

## 2018-02-13 RX ORDER — DEXTROSE 50 % IN WATER 50 %
25 SYRINGE (ML) INTRAVENOUS ONCE
Qty: 0 | Refills: 0 | Status: DISCONTINUED | OUTPATIENT
Start: 2018-02-13 | End: 2018-02-14

## 2018-02-13 RX ORDER — BUDESONIDE AND FORMOTEROL FUMARATE DIHYDRATE 160; 4.5 UG/1; UG/1
2 AEROSOL RESPIRATORY (INHALATION)
Qty: 0 | Refills: 0 | Status: DISCONTINUED | OUTPATIENT
Start: 2018-02-13 | End: 2018-02-18

## 2018-02-13 RX ORDER — INSULIN LISPRO 100/ML
VIAL (ML) SUBCUTANEOUS
Qty: 0 | Refills: 0 | Status: DISCONTINUED | OUTPATIENT
Start: 2018-02-13 | End: 2018-02-14

## 2018-02-13 RX ORDER — ASPIRIN/CALCIUM CARB/MAGNESIUM 324 MG
81 TABLET ORAL DAILY
Qty: 0 | Refills: 0 | Status: DISCONTINUED | OUTPATIENT
Start: 2018-02-13 | End: 2018-03-23

## 2018-02-13 RX ORDER — DOBUTAMINE HCL 250MG/20ML
10 VIAL (ML) INTRAVENOUS
Qty: 500 | Refills: 0 | Status: DISCONTINUED | OUTPATIENT
Start: 2018-02-13 | End: 2018-02-17

## 2018-02-13 RX ORDER — POLYETHYLENE GLYCOL 3350 17 G/17G
17 POWDER, FOR SOLUTION ORAL DAILY
Qty: 0 | Refills: 0 | Status: DISCONTINUED | OUTPATIENT
Start: 2018-02-13 | End: 2018-03-23

## 2018-02-13 RX ORDER — ATORVASTATIN CALCIUM 80 MG/1
80 TABLET, FILM COATED ORAL AT BEDTIME
Qty: 0 | Refills: 0 | Status: DISCONTINUED | OUTPATIENT
Start: 2018-02-13 | End: 2018-03-23

## 2018-02-13 RX ORDER — IPRATROPIUM/ALBUTEROL SULFATE 18-103MCG
3 AEROSOL WITH ADAPTER (GRAM) INHALATION EVERY 4 HOURS
Qty: 0 | Refills: 0 | Status: DISCONTINUED | OUTPATIENT
Start: 2018-02-13 | End: 2018-02-20

## 2018-02-13 RX ORDER — INSULIN LISPRO 100/ML
VIAL (ML) SUBCUTANEOUS AT BEDTIME
Qty: 0 | Refills: 0 | Status: DISCONTINUED | OUTPATIENT
Start: 2018-02-13 | End: 2018-02-14

## 2018-02-13 RX ORDER — DEXTROSE 50 % IN WATER 50 %
12.5 SYRINGE (ML) INTRAVENOUS ONCE
Qty: 0 | Refills: 0 | Status: DISCONTINUED | OUTPATIENT
Start: 2018-02-13 | End: 2018-02-14

## 2018-02-13 RX ORDER — WARFARIN SODIUM 2.5 MG/1
3 TABLET ORAL ONCE
Qty: 0 | Refills: 0 | Status: COMPLETED | OUTPATIENT
Start: 2018-02-13 | End: 2018-02-13

## 2018-02-13 RX ORDER — MIDODRINE HYDROCHLORIDE 2.5 MG/1
10 TABLET ORAL THREE TIMES A DAY
Qty: 0 | Refills: 0 | Status: DISCONTINUED | OUTPATIENT
Start: 2018-02-13 | End: 2018-02-18

## 2018-02-13 RX ORDER — DEXTROSE 50 % IN WATER 50 %
1 SYRINGE (ML) INTRAVENOUS ONCE
Qty: 0 | Refills: 0 | Status: DISCONTINUED | OUTPATIENT
Start: 2018-02-13 | End: 2018-02-14

## 2018-02-13 RX ORDER — LEVOTHYROXINE SODIUM 125 MCG
75 TABLET ORAL DAILY
Qty: 0 | Refills: 0 | Status: DISCONTINUED | OUTPATIENT
Start: 2018-02-13 | End: 2018-03-23

## 2018-02-13 RX ORDER — AMIODARONE HYDROCHLORIDE 400 MG/1
100 TABLET ORAL DAILY
Qty: 0 | Refills: 0 | Status: DISCONTINUED | OUTPATIENT
Start: 2018-02-13 | End: 2018-03-23

## 2018-02-13 RX ORDER — FINASTERIDE 5 MG/1
5 TABLET, FILM COATED ORAL DAILY
Qty: 0 | Refills: 0 | Status: DISCONTINUED | OUTPATIENT
Start: 2018-02-13 | End: 2018-03-23

## 2018-02-13 RX ORDER — DOCUSATE SODIUM 100 MG
100 CAPSULE ORAL
Qty: 0 | Refills: 0 | Status: DISCONTINUED | OUTPATIENT
Start: 2018-02-13 | End: 2018-03-23

## 2018-02-13 RX ORDER — SODIUM CHLORIDE 9 MG/ML
1000 INJECTION, SOLUTION INTRAVENOUS
Qty: 0 | Refills: 0 | Status: DISCONTINUED | OUTPATIENT
Start: 2018-02-13 | End: 2018-02-14

## 2018-02-13 RX ORDER — SENNA PLUS 8.6 MG/1
2 TABLET ORAL AT BEDTIME
Qty: 0 | Refills: 0 | Status: DISCONTINUED | OUTPATIENT
Start: 2018-02-13 | End: 2018-03-23

## 2018-02-13 RX ADMIN — Medication 22.5 MICROGRAM(S)/KG/MIN: at 18:54

## 2018-02-13 RX ADMIN — Medication 30 MILLIGRAM(S): at 23:57

## 2018-02-13 RX ADMIN — WARFARIN SODIUM 3 MILLIGRAM(S): 2.5 TABLET ORAL at 23:57

## 2018-02-13 NOTE — H&P ADULT - HISTORY OF PRESENT ILLNESS
Patient is a 62 year old man with ESRD (HD MWF), NICM (EF 21%) s/p ICD, PICC line in place with home dobutamine drip, atrial fibrillation on coumadin, COPD on home O2 (4-5L), hypotension on midodrine with recent admission 1/23-1/31 for diarrhea found to be positive for human meta pneumovirus who now returns to [facility] ER complaining of shortness of breath that started earlier this evening. He describes the sensation as his "oxygen not being enough him." He denies sick contacts or travel since last admission. He denies fever, chills, cough, chest pain, wheezing, abdominal pain, vomiting. He endorses LE edema worse than usual as well as one episode of non-bloody diarrhea. Patient states he tried his inhaler with no relief of SOB. He completed HD yesterday and had removal of 3.5L. He denies dietary indiscretion. Patient currently is on BiPAP and feels comfortable using the mask. Pt does not make urine.     In ER: T 98.4, HR 83, BP 91/52, RR 14, SpO2 98% on 40% FiO2 on BiPAP 12/5. Patient received dobutamine gtts at home dose in ER. Patient is a 62 year old man with ESRD (HD MWF), NICM (EF 21%) s/p ICD, PICC line in place with home dobutamine drip, atrial fibrillation on coumadin, COPD on home O2 (4-5L), hypotension on midodrine with recent admission 1/23-1/31 for diarrhea found to be positive for human meta pneumovirus who now returns to [facility] ER complaining of shortness of breath that started earlier this evening. He describes the sensation as his "oxygen not being enough him." He denies sick contacts or travel since last admission. He denies fever, chills, cough, chest pain, wheezing, abdominal pain, vomiting. He endorses LE edema worse than usual as well as one episode of non-bloody diarrhea. Patient states he tried his inhaler with no relief of SOB. He completed HD yesterday and had removal of 3.5L. He denies dietary indiscretion. Patient currently is on BiPAP and feels comfortable using the mask. Pt does not make urine.     In ER: T 98.4, HR 83, BP 91/52, RR 14, SpO2 98% on 40% FiO2 on BiPAP 12/5. Patient received dobutamine gtts at home dose in ER. RVP+ for influenza B infection. Patient is a 62 year old man with ESRD (HD MWF), NICM (EF 21%) s/p ICD, PICC line in place with home dobutamine drip, atrial fibrillation on coumadin, COPD on home O2 (4-5L), pulmonary fibrosis, hypotension on midodrine with recent admission 1/23-1/31 for diarrhea found to be positive for human meta pneumovirus who now returns to [facility] ER complaining of shortness of breath that started earlier this evening. He describes the sensation as his "oxygen not being enough him." He denies sick contacts or travel since last admission. He denies fever, chills, cough, chest pain, wheezing, abdominal pain, vomiting. He endorses LE edema worse than usual as well as one episode of non-bloody diarrhea. Patient states he tried his inhaler with no relief of SOB. He completed HD yesterday and had removal of 3.5L. He denies dietary indiscretion. Patient currently is on BiPAP and feels comfortable using the mask. Pt does not make urine.     In ER: T 98.4, HR 83, BP 91/52, RR 14, SpO2 98% on 40% FiO2 on BiPAP 12/5. Patient received dobutamine gtts at home dose in ER. RVP+ for influenza B infection.

## 2018-02-13 NOTE — ED PROVIDER NOTE - OBJECTIVE STATEMENT
62M h/o ESRD (HD MWF), NICM (EF 21%), ICD, PICC line (LUE) in place with home dobutamine drip, afib on coumadin, COPD on home O2 (4-5L), hypotension on midodrine BIBEMS as a notification for SOB x 2 days. Had HD yesterday removed 3.5L with some improvement. Was hypotensive to 90s systolic in field, but is baseline as per patient. Endorses constipation, had normal BM today. Denies fever, cough.

## 2018-02-13 NOTE — H&P ADULT - NSHPREVIEWOFSYSTEMS_GEN_ALL_CORE
General: no fever, no chills  Ophthalmologic: no change in vision  ENMT: no sore throat  Respiratory and Thorax: +SOB  Cardiovascular: no CP  Gastrointestinal: no abd pain, +one episode non-bloody diarrhea  Genitourinary: no dysuria  Musculoskeletal: no joint pain  Neurological: no HA  Psychiatric: no anxiety/depression  Hematology/Lymphatics: no abnormal bleeding  Endocrine: no changes in weight

## 2018-02-13 NOTE — ED PROVIDER NOTE - ATTENDING CONTRIBUTION TO CARE
DR. ALDANA, ATTENDING MD-  I performed a face to face bedside interview with patient regarding history of present illness, review of symptoms and past medical history. I completed an independent physical exam.  I have discussed patient's plan of care with the resident.   Documentation as above in the note.    Tobias: H/o severe chronic CHF (EF~10% per notes from recent admission) on home dobutamine, h/o chronic ILD on home O2, ESRD, c/o dyspnea since yesterday, somewhat improved with HD yesterday where they removed 3.5L.  Found to be hypotensive in field (90's) but per patient his baseline is 90's.  On presentation to trauma room was tachypneic but maintaining sats in mid-90's on supplemental O2.  Lungs bibasilar rales.  Heart irr irr, abd soft.  Placed on bipap with subj improvement in sx, mentating well with SBP in low 90's.  Likely this is mulitfactorial in patient with very limited pulmonary reserve both secondary to his severe heart and lung disease.  Currently doing better on bipap.  If afebrile and no obvious pneumonia on CXR will hold off on abx. Will check RPP.  admit.

## 2018-02-13 NOTE — ED ADULT NURSE NOTE - OBJECTIVE STATEMENT
[facility], tachypneic on assessment per EMS worsening x several hours, pt on dobutamine drip on arrival from home per PCP, infusing @ 10mcg/kg/min through left PICC line confirmed via CXR as per sons at bedside has been receiving dobutamine for 2 years r/t unk reason. Pt placed on BIPAP, tolerating well, right chest wall shiley in place on assessment, fully dialyzed yesterday (MWF), old R AV fistula in place, OKAY to use R arm, peripheral IV placed, labs sent, VS as documented, pt's home medication pump turned off and given to family, hospital equipment utilized for continuation of medication infusion, will continue to monitor.

## 2018-02-13 NOTE — H&P ADULT - PROBLEM SELECTOR PLAN 5
-patient for HD M/W/F. With worsening LE edema however s/p 3.5L fluid removal yesterday. Electrolytes acceptable. Patient not in need of urgent HD. Discussed plan with Dr. Ridley he will place patient on schedule for AM HD.

## 2018-02-13 NOTE — H&P ADULT - PROBLEM SELECTOR PLAN 4
-patient on amiodarone with severe pulmonary fibrosis and very little reserve  -previous notes document that if increased FiO2 requirements, dc amiodarone  -patient with influenza b infection at this time which is likely the cause of his respiratory distress  -would c/w amiodarone for now and f/u with Dr. Davis and Dr. Coffman

## 2018-02-13 NOTE — H&P ADULT - NSHPOUTPATIENTPROVIDERS_GEN_ALL_CORE
Dr. Keating (nephrology)  Dr. Dvais (cardiology)  Dr. Coffman (pulmonology) Dr. Solo Ridley (nephrology)  Dr. Davis (cardiology)  Dr. Coffman (pulmonology)

## 2018-02-13 NOTE — H&P ADULT - NSHPLABSRESULTS_GEN_ALL_CORE
12.7   6.42  )-----------( 110      ( 13 Feb 2018 18:40 )             40.8     02-13    137  |  95<L>  |  35<H>  ----------------------------<  141<H>  4.9   |  27  |  5.96<H>    Ca    8.7      13 Feb 2018 18:40    TPro  8.6<H>  /  Alb  3.3  /  TBili  0.7  /  DBili  x   /  AST  80<H>  /  ALT  52<H>  /  AlkPhos  233<H>  02-13        CAPILLARY BLOOD GLUCOSE        PT/INR - ( 13 Feb 2018 18:40 )   PT: 23.0 SEC;   INR: 1.97          PTT - ( 13 Feb 2018 18:40 )  PTT:46.4 SEC    Blood Gas Venous Comprehensive (02.13.18 @ 18:40)    Blood Gas Venous - Chloride: 102 mmol/L    Blood Gas Venous - Lactate: 3.2: Please note updated reference range. mmol/L    Blood Gas Venous - Creatinine: 5.92 mg/dL    pH, Venous: 7.32 pH    pCO2, Venous: 61 mmHg    pO2, Venous: 26 mmHg    HCO3, Venous: 26 mmol/L    Base Excess, Venous: 4.4: REFERENCE RANGE = -3 + 2 mmol/L mmol/L    Oxygen Saturation, Venous: 33.5 %    Blood Gas Venous - Sodium: 136 mmol/L    Blood Gas Venous - Potassium: 4.5 mmol/L    Blood Gas Venous - Glucose: 154    Blood Gas Venous - Hemoglobin: 12.8 g/dL    Blood Gas Venous - Hematocrit: 39.3 %    Rapid Respiratory Viral Panel (02.13.18 @ 18:40)    Adenovirus (RapRVP): NOT DETECTED    Influenza A (RapRVP): NOT DETECTED (any subtype)    Influenza AH1 2009 (RapRVP): NOT DETECTED    Influenza AH1 (RapRVP): NOT DETECTED    Influenza AH3 (RapRVP): NOT DETECTED    Influenza B (RapRVP): POSITIVE    Parainfluenza 1 (RapRVP): NOT DETECTED    Parainfluenza 2 (RapRVP): NOT DETECTED    Parainfluenza 3 (RapRVP): NOT DETECTED    Parainfluenza 4 (RapRVP): NOT DETECTED    Resp Syncytial Virus (RapRVP): NOT DETECTED    Bordetella pertussis (RapRVP): NOT DETECTED    Chlamydia pneumoniae (RapRVP): NOT DETECTED    Mycoplasma pneumoniae (RapRVP): NOT DETECTED This nucleic acid amplification assay was performed using  the [employer] Film[employer]. The following pathogens are tested  for: Adenovirus, Coronavirus 229E, Coronavirus HKU1,  Coronavirus NL63, Coronavirus OC43, Human Metapneumovirus  (HMPV), Rhinovirus/Enterovirus, Influenza A H1, Influenza A  H1 2009 (Pandemic H1 2009), Influenza A H3, Influenza A (Flu  A) subtype not identified, Influenza B, Parainfluenza Virus  (types 1, 2, 3, 4), Respiratory Syncytial Virus (RSV),  Bordetella pertussis, Chlamydophila pneumoniae, and  Mycoplasma pneumoniae. A negative FilmArray result does not  always exclude the possibility of viral or bacterial  infection. Laboratory results should always be interpreted  in the context of clinical findings.    Entero/Rhinovirus (RapRVP): NOT DETECTED    HKU1 Coronavirus (RapRVP): NOT DETECTED    NL63 Coronavirus (RapRVP): NOT DETECTED    229E Coronavirus (RapRVP): NOT DETECTED    OC43 Coronavirus (RapRVP): NOT DETECTED    hMPV (RapRVP): NOT DETECTED    EKG: afib with PVCs; unchanged from previous  CXR: pulmonary edema unchanged from previous CXR.

## 2018-02-13 NOTE — ED PROVIDER NOTE - CARE PLAN
Principal Discharge DX:	SOB (shortness of breath) Principal Discharge DX:	SOB (shortness of breath)  Secondary Diagnosis:	CHF (congestive heart failure)  Secondary Diagnosis:	Chronic hypotension

## 2018-02-13 NOTE — ED ADULT TRIAGE NOTE - CHIEF COMPLAINT QUOTE
Pt brought in by DOREEN in respiratory distress pt is awake oriented x 3 and appears tachypneic.  Pt arrived with a  dobutamine drip infusing from home,  Pt also has a right chest wall catheter for dialysis

## 2018-02-13 NOTE — H&P ADULT - ATTENDING COMMENTS
61 y/o M with ESRD, CHF (EF20%) on chronic continuous dobutamine infusion, AF, COPD and pulmonary fibrosis on home O2 p/w dyspnea and found to be influenza positive.  Placed on BiPAP for work of breathing.  Pt presently feels ok with continued dyspnea when taken off bipap.  Had brief hypotension to SBP 70's, now improved.    On exam: breathing comforably while resting, but becomes dyspneic with any movt.  Heart RRR.  Lungs CTA without wheezes or rales.  BLE edema.  Abd s/nt/nd norm BS.  Labs, EKG, and CXR reviewed  1) Influenza B infection  - continue tamiflu  2) acute on chronic hypoxic respiratory failure 2/2 COPD and Pulm fibrosis  - no evidence of COPD exacerbation  - continue BIPAP  - pulmonology in am  3) chronic systolic congestive heart failure  - no evidence of acute decompensation

## 2018-02-13 NOTE — H&P ADULT - PROBLEM SELECTOR PLAN 1
-start tamiflu one dose stat remaining to be dosed after hemodialysis for total 5 days.   -c/w BiPAP for now for work of breathing; trial off BiPAP in AM  -afebrile and hemodynamically stable (has chronic hypotension)

## 2018-02-13 NOTE — H&P ADULT - PROBLEM SELECTOR PLAN 2
-patient with elevated CK, CKMB and troponin which are likely elevated in setting of acute infection in patient with very poor cardiorespiratory reserve given severe HF and COPD and pulmonary fibrosis.   -patient is without any chest pain, BP is at baseline, EKG without changes compared to prior  -would continue to monitor -patient with elevated CK, CKMB and troponin which are likely elevated in setting of acute infection in patient with very poor cardiorespiratory reserve given severe HF and COPD and pulmonary fibrosis.   -patient is without any chest pain, BP is at baseline, EKG without changes compared to prior  -would continue to monitor  -trend troponin.

## 2018-02-13 NOTE — H&P ADULT - NSHPPHYSICALEXAM_GEN_ALL_CORE
Vital Signs Last 24 Hrs  T(C): 36.9 (13 Feb 2018 20:39), Max: 37 (13 Feb 2018 18:55)  T(F): 98.4 (13 Feb 2018 20:39), Max: 98.6 (13 Feb 2018 18:55)  HR: 83 (13 Feb 2018 20:39) (83 - 95)  BP: 91/52 (13 Feb 2018 20:39) (89/48 - 100/50)  BP(mean): --  RR: 14 (13 Feb 2018 20:39) (14 - 28)  SpO2: 98% (13 Feb 2018 20:39) (97% - 98%)    Constitutional: no acute distress, working with BiPAP  Eyes: clear conjunctiva  ENMT: dry oral mucosa  Respiratory: coarse breath sounds bilaterally, no rales auscultated  Cardiovascular: S1, S2, irregular rhythm  Gastrointestinal: soft, NT, ND  Extremities: +3 bilateral LE edema  Neurological: AAO x 4  Skin: chronic skin changes in b/l LE 2/2 edema/venous stasis  Musculoskeletal: no calf tenderness  Psychiatric: normal affect

## 2018-02-13 NOTE — H&P ADULT - ASSESSMENT
62 year old man with ESRD (HD MWF), NICM (EF 21%) s/p ICD, PICC line in place with home dobutamine drip, atrial fibrillation on coumadin, COPD on home O2 (4-5L), hypotension on midodrine admitted with SOB secondary to influenza B infection. 62 year old man with ESRD (HD MWF), NICM (EF 21%) s/p ICD, PICC line in place with home dobutamine drip, atrial fibrillation on coumadin, COPD on home O2 (4-5L), pulmonary fibrosis, hypotension on midodrine admitted with SOB secondary to influenza B infection.

## 2018-02-13 NOTE — H&P ADULT - PROBLEM SELECTOR PLAN 3
-continue with duoneb q 4 hours standing and BiPAP for now.  -tamiflu for influenza infection  -c/w symbicort, consider starting azithromycin given severe lung disease -continue with duoneb q 4 hours standing and BiPAP for now. No signs of COPD exacerbation, no wheezing.   -tamiflu for influenza infection  -c/w symbicort,  -should patient worsen overnight would dose steroids and possibly azithromycin  -f/u Pulm in AM.

## 2018-02-14 LAB
APTT BLD: 43.8 SEC — HIGH (ref 27.5–37.4)
BASOPHILS # BLD AUTO: 0.02 K/UL — SIGNIFICANT CHANGE UP (ref 0–0.2)
BASOPHILS NFR BLD AUTO: 0.4 % — SIGNIFICANT CHANGE UP (ref 0–2)
BASOPHILS NFR SPEC: 0.5 % — SIGNIFICANT CHANGE UP (ref 0–2)
BLASTS # FLD: 0 % — SIGNIFICANT CHANGE UP (ref 0–0)
BUN SERPL-MCNC: 41 MG/DL — HIGH (ref 7–23)
CALCIUM SERPL-MCNC: 8.7 MG/DL — SIGNIFICANT CHANGE UP (ref 8.4–10.5)
CHLORIDE SERPL-SCNC: 95 MMOL/L — LOW (ref 98–107)
CK MB BLD-MCNC: 1.7 — SIGNIFICANT CHANGE UP (ref 0–2.5)
CK MB BLD-MCNC: 6.3 NG/ML — SIGNIFICANT CHANGE UP (ref 1–6.6)
CK MB BLD-MCNC: 6.9 NG/ML — HIGH (ref 1–6.6)
CK SERPL-CCNC: 373 U/L — HIGH (ref 30–200)
CK SERPL-CCNC: 432 U/L — HIGH (ref 30–200)
CO2 SERPL-SCNC: 26 MMOL/L — SIGNIFICANT CHANGE UP (ref 22–31)
CREAT SERPL-MCNC: 6.37 MG/DL — HIGH (ref 0.5–1.3)
EOSINOPHIL # BLD AUTO: 0.14 K/UL — SIGNIFICANT CHANGE UP (ref 0–0.5)
EOSINOPHIL NFR BLD AUTO: 2.8 % — SIGNIFICANT CHANGE UP (ref 0–6)
EOSINOPHIL NFR FLD: 0.5 % — SIGNIFICANT CHANGE UP (ref 0–6)
GIANT PLATELETS BLD QL SMEAR: PRESENT — SIGNIFICANT CHANGE UP
GLUCOSE SERPL-MCNC: 88 MG/DL — SIGNIFICANT CHANGE UP (ref 70–99)
HCT VFR BLD CALC: 35.7 % — LOW (ref 39–50)
HGB BLD-MCNC: 11.2 G/DL — LOW (ref 13–17)
HYPOCHROMIA BLD QL: SLIGHT — SIGNIFICANT CHANGE UP
IMM GRANULOCYTES # BLD AUTO: 0.03 # — SIGNIFICANT CHANGE UP
IMM GRANULOCYTES NFR BLD AUTO: 0.6 % — SIGNIFICANT CHANGE UP (ref 0–1.5)
INR BLD: 1.93 — HIGH (ref 0.88–1.17)
LYMPHOCYTES # BLD AUTO: 0.91 K/UL — LOW (ref 1–3.3)
LYMPHOCYTES # BLD AUTO: 17.9 % — SIGNIFICANT CHANGE UP (ref 13–44)
LYMPHOCYTES NFR SPEC AUTO: 7 % — LOW (ref 13–44)
MACROCYTES BLD QL: SIGNIFICANT CHANGE UP
MAGNESIUM SERPL-MCNC: 2.1 MG/DL — SIGNIFICANT CHANGE UP (ref 1.6–2.6)
MCHC RBC-ENTMCNC: 30.8 PG — SIGNIFICANT CHANGE UP (ref 27–34)
MCHC RBC-ENTMCNC: 31.4 % — LOW (ref 32–36)
MCV RBC AUTO: 98.1 FL — SIGNIFICANT CHANGE UP (ref 80–100)
METAMYELOCYTES # FLD: 0 % — SIGNIFICANT CHANGE UP (ref 0–1)
MICROCYTES BLD QL: SLIGHT — SIGNIFICANT CHANGE UP
MONOCYTES # BLD AUTO: 0.82 K/UL — SIGNIFICANT CHANGE UP (ref 0–0.9)
MONOCYTES NFR BLD AUTO: 17 % — HIGH (ref 2–14)
MONOCYTES NFR BLD: 14.5 % — HIGH (ref 2–9)
MYELOCYTES NFR BLD: 0 % — SIGNIFICANT CHANGE UP (ref 0–0)
NEUTROPHIL AB SER-ACNC: 66.8 % — SIGNIFICANT CHANGE UP (ref 43–77)
NEUTROPHILS # BLD AUTO: 3.15 K/UL — SIGNIFICANT CHANGE UP (ref 1.8–7.4)
NEUTROPHILS NFR BLD AUTO: 62.1 % — SIGNIFICANT CHANGE UP (ref 43–77)
NEUTS BAND # BLD: 7 % — HIGH (ref 0–6)
NRBC # FLD: 0 — SIGNIFICANT CHANGE UP
OTHER - HEMATOLOGY %: 0 — SIGNIFICANT CHANGE UP
PHOSPHATE SERPL-MCNC: 3.4 MG/DL — SIGNIFICANT CHANGE UP (ref 2.5–4.5)
PLATELET # BLD AUTO: 97 K/UL — LOW (ref 150–400)
PLATELET COUNT - ESTIMATE: SIGNIFICANT CHANGE UP
PMV BLD: 13.4 FL — HIGH (ref 7–13)
POTASSIUM SERPL-MCNC: 4.2 MMOL/L — SIGNIFICANT CHANGE UP (ref 3.5–5.3)
POTASSIUM SERPL-SCNC: 4.2 MMOL/L — SIGNIFICANT CHANGE UP (ref 3.5–5.3)
PROMYELOCYTES # FLD: 0 % — SIGNIFICANT CHANGE UP (ref 0–0)
PROTHROM AB SERPL-ACNC: 21.7 SEC — HIGH (ref 9.8–13.1)
RBC # BLD: 3.64 M/UL — LOW (ref 4.2–5.8)
RBC # FLD: 16.9 % — HIGH (ref 10.3–14.5)
SMUDGE CELLS # BLD: PRESENT — SIGNIFICANT CHANGE UP
SODIUM SERPL-SCNC: 137 MMOL/L — SIGNIFICANT CHANGE UP (ref 135–145)
TROPONIN T SERPL-MCNC: 0.63 NG/ML — HIGH (ref 0–0.06)
TROPONIN T SERPL-MCNC: 0.68 NG/ML — HIGH (ref 0–0.06)
VARIANT LYMPHS # BLD: 3.7 % — SIGNIFICANT CHANGE UP
WBC # BLD: 5.07 K/UL — SIGNIFICANT CHANGE UP (ref 3.8–10.5)
WBC # FLD AUTO: 5.07 K/UL — SIGNIFICANT CHANGE UP (ref 3.8–10.5)

## 2018-02-14 PROCEDURE — 99233 SBSQ HOSP IP/OBS HIGH 50: CPT | Mod: GC

## 2018-02-14 RX ORDER — GLUCAGON INJECTION, SOLUTION 0.5 MG/.1ML
1 INJECTION, SOLUTION SUBCUTANEOUS ONCE
Qty: 0 | Refills: 0 | Status: DISCONTINUED | OUTPATIENT
Start: 2018-02-14 | End: 2018-03-23

## 2018-02-14 RX ORDER — DEXTROSE 50 % IN WATER 50 %
12.5 SYRINGE (ML) INTRAVENOUS ONCE
Qty: 0 | Refills: 0 | Status: DISCONTINUED | OUTPATIENT
Start: 2018-02-14 | End: 2018-03-23

## 2018-02-14 RX ORDER — SODIUM CHLORIDE 9 MG/ML
1000 INJECTION, SOLUTION INTRAVENOUS
Qty: 0 | Refills: 0 | Status: DISCONTINUED | OUTPATIENT
Start: 2018-02-14 | End: 2018-03-23

## 2018-02-14 RX ORDER — DEXTROSE 50 % IN WATER 50 %
25 SYRINGE (ML) INTRAVENOUS ONCE
Qty: 0 | Refills: 0 | Status: DISCONTINUED | OUTPATIENT
Start: 2018-02-14 | End: 2018-03-23

## 2018-02-14 RX ORDER — SODIUM CHLORIDE 9 MG/ML
250 INJECTION, SOLUTION INTRAVENOUS
Qty: 0 | Refills: 0 | Status: COMPLETED | OUTPATIENT
Start: 2018-02-14 | End: 2018-02-14

## 2018-02-14 RX ORDER — MIDODRINE HYDROCHLORIDE 2.5 MG/1
10 TABLET ORAL ONCE
Qty: 0 | Refills: 0 | Status: COMPLETED | OUTPATIENT
Start: 2018-02-14 | End: 2018-02-14

## 2018-02-14 RX ORDER — DEXTROSE 50 % IN WATER 50 %
25 SYRINGE (ML) INTRAVENOUS ONCE
Qty: 0 | Refills: 0 | Status: COMPLETED | OUTPATIENT
Start: 2018-02-14 | End: 2018-02-14

## 2018-02-14 RX ORDER — DEXTROSE 50 % IN WATER 50 %
1 SYRINGE (ML) INTRAVENOUS ONCE
Qty: 0 | Refills: 0 | Status: DISCONTINUED | OUTPATIENT
Start: 2018-02-14 | End: 2018-03-23

## 2018-02-14 RX ORDER — WARFARIN SODIUM 2.5 MG/1
4 TABLET ORAL ONCE
Qty: 0 | Refills: 0 | Status: COMPLETED | OUTPATIENT
Start: 2018-02-14 | End: 2018-02-14

## 2018-02-14 RX ADMIN — FINASTERIDE 5 MILLIGRAM(S): 5 TABLET, FILM COATED ORAL at 13:16

## 2018-02-14 RX ADMIN — Medication 3 MILLILITER(S): at 05:45

## 2018-02-14 RX ADMIN — Medication 81 MILLIGRAM(S): at 13:16

## 2018-02-14 RX ADMIN — MIDODRINE HYDROCHLORIDE 10 MILLIGRAM(S): 2.5 TABLET ORAL at 13:16

## 2018-02-14 RX ADMIN — Medication 3 MILLILITER(S): at 16:13

## 2018-02-14 RX ADMIN — Medication 25 MILLILITER(S): at 17:38

## 2018-02-14 RX ADMIN — Medication 3 MILLILITER(S): at 20:19

## 2018-02-14 RX ADMIN — MIDODRINE HYDROCHLORIDE 10 MILLIGRAM(S): 2.5 TABLET ORAL at 05:53

## 2018-02-14 RX ADMIN — Medication 3 MILLILITER(S): at 02:22

## 2018-02-14 RX ADMIN — MIDODRINE HYDROCHLORIDE 10 MILLIGRAM(S): 2.5 TABLET ORAL at 22:49

## 2018-02-14 RX ADMIN — MIDODRINE HYDROCHLORIDE 10 MILLIGRAM(S): 2.5 TABLET ORAL at 03:28

## 2018-02-14 RX ADMIN — WARFARIN SODIUM 4 MILLIGRAM(S): 2.5 TABLET ORAL at 19:01

## 2018-02-14 RX ADMIN — Medication 3 MILLILITER(S): at 13:55

## 2018-02-14 RX ADMIN — Medication 30 MILLIGRAM(S): at 13:16

## 2018-02-14 RX ADMIN — Medication 25 MILLILITER(S): at 09:10

## 2018-02-14 RX ADMIN — ATORVASTATIN CALCIUM 80 MILLIGRAM(S): 80 TABLET, FILM COATED ORAL at 22:49

## 2018-02-14 RX ADMIN — Medication 1 TABLET(S): at 13:15

## 2018-02-14 RX ADMIN — SODIUM CHLORIDE 50 MILLILITER(S): 9 INJECTION, SOLUTION INTRAVENOUS at 19:01

## 2018-02-14 RX ADMIN — SENNA PLUS 2 TABLET(S): 8.6 TABLET ORAL at 22:49

## 2018-02-14 RX ADMIN — Medication 22.5 MICROGRAM(S)/KG/MIN: at 05:57

## 2018-02-14 NOTE — PROGRESS NOTE ADULT - PROBLEM SELECTOR PLAN 4
-patient on amiodarone with severe pulmonary fibrosis and very little reserve  -previous notes document that if increased FiO2 requirements, dc amiodarone  -patient with influenza b infection at this time which is likely the cause of his respiratory distress  -would c/w amiodarone for now and f/u with Dr. Davis and Dr. Coffman -patient on amiodarone with severe pulmonary fibrosis and very little reserve  -previous notes document that if increased FiO2 requirements, dc amiodarone  -patient with influenza b infection at this time which is likely the cause of his respiratory distress  -will c/w amiodarone for now and f/u recommendations from pt's outpt cardiologist, Dr. Davis.

## 2018-02-14 NOTE — CONSULT NOTE ADULT - PROBLEM SELECTOR RECOMMENDATION 9
Maintenance HD schedule MWF.  Tolerating HD this morning with 2-2.5 UF goal.  Electrolytes acceptable.   May plan for repeat PUF treatment tomorrow to optimize fluid status.   Continue midodrine.

## 2018-02-14 NOTE — PROGRESS NOTE ADULT - PROBLEM SELECTOR PLAN 3
-continue with duoneb q 4 hours standing and BiPAP for now. No signs of COPD exacerbation, no wheezing.   -tamiflu for influenza infection  -c/w symbicort,  -should patient worsen overnight would dose steroids and possibly azithromycin  -f/u Pulm in AM. -continue with duoneb q 4 hours standing and BiPAP for now. No signs of COPD exacerbation, no wheezing.   -tamiflu for influenza infection  -c/w symbicort,  -should patient worsen, can dose steroids and possibly azithromycin  -pt's outpt pulmonologist Dr. Coffman consulted, f/u recs

## 2018-02-14 NOTE — PROGRESS NOTE ADULT - SUBJECTIVE AND OBJECTIVE BOX
PROVIDER CONTACT INFO:  MD RONEL Storey Pager: [phone]  Long Range Pager: [phone]    KURT STRONG  62y  Male      Patient is a 62y old  Male who presents with a chief complaint of SOB x few hours (13 Feb 2018 22:11)      INTERVAL HPI/OVERNIGHT EVENTS: No overnight events.     T(C): 37.1 (02-14-18 @ 07:37), Max: 37.1 (02-14-18 @ 07:37)  HR: 74 (02-14-18 @ 07:37) (70 - 95)  BP: 90/53 (02-14-18 @ 07:37) (74/46 - 111/66)  RR: 22 (02-14-18 @ 07:37) (14 - 28)  SpO2: 100% (02-14-18 @ 05:52) (96% - 100%)  Wt(kg): --Vital Signs Last 24 Hrs  T(C): 37.1 (14 Feb 2018 07:37), Max: 37.1 (14 Feb 2018 07:37)  T(F): 98.7 (14 Feb 2018 07:37), Max: 98.7 (14 Feb 2018 07:37)  HR: 74 (14 Feb 2018 07:37) (70 - 95)  BP: 90/53 (14 Feb 2018 07:37) (74/46 - 111/66)  BP(mean): --  RR: 22 (14 Feb 2018 07:37) (14 - 28)  SpO2: 100% (14 Feb 2018 05:52) (96% - 100%)    PHYSICAL EXAM:  GENERAL: NAD, well-groomed, well-developed  HEAD:  Atraumatic, Normocephalic  EYES: EOMI, PERRLA, conjunctiva and sclera clear  ENMT: No tonsillar erythema, exudates,; Moist mucous membranes. No lesions  NECK: Supple, No JVD, Normal thyroid  CHEST/LUNG: Clear to percussion bilaterally; No rales, rhonchi, wheezing, or rubs  HEART: Regular rate and rhythm; No murmurs, rubs, or gallops  ABDOMEN: Soft, Nontender, Nondistended; Bowel sounds present.  No hepatosplenomegaly  EXTREMITIES:  2+ Peripheral Pulses, No clubbing, cyanosis, or edema  LYMPH: No lymphadenopathy noted  SKIN: No rashes or lesions  PSYCH: Alert & Oriented x3  NERVOUS SYSTEM:  ; Motor Strength 5/5 B/L upper and lower extremities.  Sensory intact    Consultant(s) Notes Reviewed:  [x ] YES  [ ] NO  Care Discussed with Consultants/Other Providers [ x] YES  [ ] NO    LABS:                        11.2   5.07  )-----------( 97       ( 14 Feb 2018 04:40 )             35.7     02-14    137  |  95<L>  |  41<H>  ----------------------------<  88  4.2   |  26  |  6.37<H>    Ca    8.7      14 Feb 2018 04:40  Phos  3.4     02-14  Mg     2.1     02-14    TPro  8.6<H>  /  Alb  3.3  /  TBili  0.7  /  DBili  x   /  AST  80<H>  /  ALT  52<H>  /  AlkPhos  233<H>  02-13    PT/INR - ( 14 Feb 2018 04:40 )   PT: 21.7 SEC;   INR: 1.93          PTT - ( 14 Feb 2018 04:40 )  PTT:43.8 SEC    CAPILLARY BLOOD GLUCOSE      POCT Blood Glucose.: 90 mg/dL (14 Feb 2018 05:48) PROVIDER CONTACT INFO:  MD RONEL Storey Pager: [phone]  Long Range Pager: [phone]    KURT STRONG  62y  Male      Patient is a 62y old  Male who presents with a chief complaint of SOB x few hours (13 Feb 2018 22:11)      INTERVAL HPI/OVERNIGHT EVENTS: No overnight events. Pt remains on BiPAP at this time. He currently denies F/C/N/V, CP, abd pain, constipation, diarrhea.     T(C): 37.1 (02-14-18 @ 07:37), Max: 37.1 (02-14-18 @ 07:37)  HR: 74 (02-14-18 @ 07:37) (70 - 95)  BP: 90/53 (02-14-18 @ 07:37) (74/46 - 111/66)  RR: 22 (02-14-18 @ 07:37) (14 - 28)  SpO2: 100% (02-14-18 @ 05:52) (96% - 100%)  Wt(kg): --Vital Signs Last 24 Hrs  T(C): 37.1 (14 Feb 2018 07:37), Max: 37.1 (14 Feb 2018 07:37)  T(F): 98.7 (14 Feb 2018 07:37), Max: 98.7 (14 Feb 2018 07:37)  HR: 74 (14 Feb 2018 07:37) (70 - 95)  BP: 90/53 (14 Feb 2018 07:37) (74/46 - 111/66)  BP(mean): --  RR: 22 (14 Feb 2018 07:37) (14 - 28)  SpO2: 100% (14 Feb 2018 05:52) (96% - 100%)    PHYSICAL EXAM:  GENERAL: NAD  HEAD:  Atraumatic, Normocephalic  EYES: EOMI, PERRLA, conjunctiva and sclera clear  ENMT: No tonsillar erythema, exudates,; Moist mucous membranes. No lesions  NECK: Supple, No JVD, Normal thyroid  CHEST/LUNG: Clear to percussion bilaterally; No rales, rhonchi, wheezing, or rubs  HEART: irregularly irregular heartbeat, no r/m/g  ABDOMEN: Soft, Nontender, Nondistended; Bowel sounds present.   EXTREMITIES:  2+ Peripheral Pulses, 2+ pitting edema in LE bilaterally, PICC line in place  LYMPH: No lymphadenopathy noted  SKIN: No rashes or lesions  PSYCH: Alert & Oriented x3    Consultant(s) Notes Reviewed:  [x ] YES  [ ] NO  Care Discussed with Consultants/Other Providers [ x] YES  [ ] NO    LABS:                        11.2   5.07  )-----------( 97       ( 14 Feb 2018 04:40 )             35.7     02-14    137  |  95<L>  |  41<H>  ----------------------------<  88  4.2   |  26  |  6.37<H>    Ca    8.7      14 Feb 2018 04:40  Phos  3.4     02-14  Mg     2.1     02-14    TPro  8.6<H>  /  Alb  3.3  /  TBili  0.7  /  DBili  x   /  AST  80<H>  /  ALT  52<H>  /  AlkPhos  233<H>  02-13    PT/INR - ( 14 Feb 2018 04:40 )   PT: 21.7 SEC;   INR: 1.93          PTT - ( 14 Feb 2018 04:40 )  PTT:43.8 SEC    CAPILLARY BLOOD GLUCOSE      POCT Blood Glucose.: 90 mg/dL (14 Feb 2018 05:48)

## 2018-02-14 NOTE — PROGRESS NOTE ADULT - PROBLEM SELECTOR PLAN 1
-start tamiflu one dose stat remaining to be dosed after hemodialysis for total 5 days.   -c/w BiPAP for now for work of breathing; trial off BiPAP in AM  -afebrile and hemodynamically stable (has chronic hypotension) -continue dialysis   -trial off BiPAP after dialysis  -afebrile and hemodynamically stable (has chronic hypotension) Resp distress most likely 2/2 influenza B. C/w supportive care  - c/w tamiflu M/W/F after dialysis for 5 days   -continue dialysis   -trial off BiPAP after dialysis  -afebrile and hemodynamically stable (has chronic hypotension)

## 2018-02-14 NOTE — PROGRESS NOTE ADULT - ASSESSMENT
62 year old man with ESRD (HD MWF), NICM (EF 21%) s/p ICD, PICC line in place with home dobutamine drip, atrial fibrillation on coumadin, COPD on home O2 (4-5L), pulmonary fibrosis, hypotension on midodrine admitted with SOB secondary to influenza B infection.

## 2018-02-14 NOTE — PROGRESS NOTE ADULT - PROBLEM SELECTOR PLAN 2
-patient with elevated CK, CKMB and troponin which are likely elevated in setting of acute infection in patient with very poor cardiorespiratory reserve given severe HF and COPD and pulmonary fibrosis.   -patient is without any chest pain, BP is at baseline, EKG without changes compared to prior  -would continue to monitor  -trend troponin. -patient with elevated CK, CKMB and troponin which are likely elevated in setting of acute infection in patient with very poor cardiorespiratory reserve given severe HF and COPD and pulmonary fibrosis. Troponins also unreliable for cardiac ischemia in setting of ESRD  -patient is without any chest pain, BP is at baseline, EKG without changes compared to prior  -will continue to monitor

## 2018-02-14 NOTE — CONSULT NOTE ADULT - SUBJECTIVE AND OBJECTIVE BOX
[facility] Consult Note Nephrology - CONSULTATION NOTE    Patient is a 62y Male with ESRD on HD MWF, NICM with severe LV dysfnxn (EF 21%), s/p biotronic ICD, on home dobutamine drip, Afib on coumadin, COPD, pulm fibrosis on 3-4L home O2, DM, and chronic hypotension on midodrine admitted with SOB. Pt reports symptoms start about 1-2 days ago, which was not relieved after HD on 2/12. Pt had full HD treatment on 2/12 without acute issues. Pt denies cough, fever/chills, or chest pain. Denies N/V. Of note, pt recently admitted to [facility] for +RVP. Denies sick contact.   Upon admission to ED, pt placed on bipap, with improvement of dyspnea. Labs showing +flu. Renal consult for +flu.     PAST MEDICAL & SURGICAL HISTORY:  Seizure: Off Keppra since 7/2017  Chronic hypotension  AF (atrial fibrillation)  COPD (chronic obstructive pulmonary disease): 4L home O2  HLD (hyperlipidemia)  DM (diabetes mellitus): Off Insulin since 7/2017  ESRD (end stage renal disease) on dialysis  BPH (benign prostatic hypertrophy)  Myocardial infarction: 10/2011  Gout  CHF (congestive heart failure)  AICD (automatic cardioverter/defibrillator) present: Biotronic - placed 9/11/09  H/O coronary angiogram    No Known Allergies    Home Medications Reviewed  Hospital Medications:   MEDICATIONS  (STANDING):  ALBUTerol/ipratropium for Nebulization 3 milliLiter(s) Nebulizer every 4 hours  amiodarone    Tablet 100 milliGRAM(s) Oral daily  aspirin enteric coated 81 milliGRAM(s) Oral daily  atorvastatin 80 milliGRAM(s) Oral at bedtime  buDESOnide 160 MICROgram(s)/formoterol 4.5 MICROgram(s) Inhaler 2 Puff(s) Inhalation two times a day  DOBUTamine Infusion 10 MICROgram(s)/kG/Min (22.5 mL/Hr) IV Continuous <Continuous>  docusate sodium 100 milliGRAM(s) Oral two times a day  finasteride 5 milliGRAM(s) Oral daily  insulin lispro (HumaLOG) corrective regimen sliding scale   SubCutaneous three times a day before meals  insulin lispro (HumaLOG) corrective regimen sliding scale   SubCutaneous at bedtime  levothyroxine 75 MICROGram(s) Oral daily  midodrine 10 milliGRAM(s) Oral three times a day  Nephro-lynda 1 Tablet(s) Oral daily  oseltamivir 30 milliGRAM(s) Oral <User Schedule>  polyethylene glycol 3350 17 Gram(s) Oral daily  senna 2 Tablet(s) Oral at bedtime  warfarin 4 milliGRAM(s) Oral once    SOCIAL HISTORY:  Denies ETOh,Smoking,   FAMILY HISTORY:  No pertinent family history in first degree relatives    REVIEW OF SYSTEMS:  CONSTITUTIONAL: No weakness, fevers or chills  EYES/ENT: No visual changes;  No vertigo or throat pain   NECK: No pain or stiffness  RESPIRATORY: No cough, wheezing, hemoptysis; +shortness of breath  CARDIOVASCULAR: No chest pain or palpitations.  GASTROINTESTINAL: No abdominal or epigastric pain. No nausea, vomiting, or hematemesis; No diarrhea or constipation. No melena or hematochezia.  GENITOURINARY: No dysuria, frequency, foamy urine, urinary urgency, incontinence or hematuria  NEUROLOGICAL: No numbness or weakness  SKIN: No itching, burning, rashes, or lesions   VASCULAR: No bilateral lower extremity edema.   All other review of systems is negative unless indicated above.    VITALS:  T(F): 98.7 (02-14-18 @ 07:37), Max: 98.7 (02-14-18 @ 07:37)  HR: 74 (02-14-18 @ 07:37)  BP: 90/53 (02-14-18 @ 07:37)  RR: 22 (02-14-18 @ 07:37)  SpO2: 100% (02-14-18 @ 05:52)  Wt(kg): --      Weight (kg): 75 (02-13 @ 18:55)  PHYSICAL EXAM:  Constitutional: NAD  HEENT: anicteric sclera, oropharynx clear, MMM  Neck: No JVD  Respiratory: Bibasilar crackles. Poor inspiratory effort  Cardiovascular: S1, S2, RRR  Gastrointestinal: BS+, soft, NT/ND  Extremities: No cyanosis or clubbing. Trace peripheral LE edema  Neurological: A/O x 3, no focal deficits  Psychiatric: Normal mood, normal affect  : No CVA tenderness. No rosa.   Skin: No rashes  Vascular Access: RIJ tunneled cath     LABS:  02-14    137  |  95<L>  |  41<H>  ----------------------------<  88  4.2   |  26  |  6.37<H>    Ca    8.7      14 Feb 2018 04:40  Phos  3.4     02-14  Mg     2.1     02-14    TPro  8.6<H>  /  Alb  3.3  /  TBili  0.7  /  DBili      /  AST  80<H>  /  ALT  52<H>  /  AlkPhos  233<H>  02-13    Creatinine Trend: 6.37 <--, 5.96 <--                        11.2   5.07  )-----------( 97       ( 14 Feb 2018 04:40 )             35.7     Urine Studies:      RADIOLOGY & ADDITIONAL STUDIES:  < from: Xray Chest 1 View-PORTABLE IMMEDIATE (02.13.18 @ 18:58) >  IMPRESSION:   Moderate pulmonary edema with small right pleural effusion. Left   costophrenic angle not fully visualized.

## 2018-02-14 NOTE — CHART NOTE - NSCHARTNOTEFT_GEN_A_CORE
-called by RN for hypoglycemic reading to 50s and hypotension to 70s. Patient was seen and evaluated at bedside. Patient was arousible and answering questions. Repeat BP on lower extremity was 93/47. Patient was given an amp of D50 with repeat FS of 95. Patient was NPO while on BIPAP, there was an attempt to wean him off to Venti mask but patient had increased WOB. Patient underwent HD earlier today with 2.5L removed. Patient currently without complaints, appears comfortable but slightly lethargic on BIPAP.        R2 Plan of Care Summary:  -admitted with Acute Hypoxic Respiratory Failure in the setting of Influenza infection: c/w BIPAP, wean as tolerated. c/w supportive care. patient's Pulmonologist is aware of patient, consult pending    -c/w home Dobutamine gtt @ 10. had elevated Trop on admission but downtrending now, nondiagnostic in the setting of ESRD. patient's Cardiologist is aware of patient, consult pending      -discussed findings and plan of care with patient and family at bedside    -FULL CODE      Maurice George M.D.  Medicine Resident, PGY-2  Pager: [phone]  Weekday PM after 7 pm and Weekends after 12, page [phone] -called by RN for hypoglycemic reading to 50s and hypotension to 70s. Patient was seen and evaluated at bedside. Patient was arousible and answering questions. Repeat BP on lower extremity was 93/47. Patient was given an amp of D50 with repeat FS of 95. Patient was NPO while on BIPAP, there was an attempt to wean him off to Venti mask but patient had increased WOB. Patient underwent HD earlier today with 2.5L removed. Patient currently without complaints, appears comfortable but slightly lethargic on BIPAP.    -in the setting of relative hypotension and hypoglycemia, will give small volume D5 + NS (250cc @ 50cc/hr) for gentle fluid resuscitation and hypoglycemia management. extreme caution given CHF and ESRD      R2 Plan of Care Summary:  -admitted with Acute Hypoxic Respiratory Failure in the setting of Influenza infection: c/w BIPAP, wean as tolerated. c/w supportive care. patient's Pulmonologist is aware of patient, consult pending    -c/w home Dobutamine gtt @ 10. had elevated Trop on admission but downtrending now, nondiagnostic in the setting of ESRD. patient's Cardiologist is aware of patient, consult pending    -discussed findings and plan of care with patient and family at bedside    -FULL CODE      Maurice George M.D.  Medicine Resident, PGY-2  Pager: [phone]  Weekday PM after 7 pm and Weekends after 12, page [phone] -called by RN for hypoglycemic reading to 50s and hypotension to 70s. Patient was seen and evaluated at bedside. Patient was arousible and answering questions. Repeat BP on lower extremity was 93/47. Patient was given an amp of D50 with repeat FS of 95. Patient was NPO while on BIPAP, there was an attempt to wean him off to Venti mask but patient had increased WOB. Patient underwent HD earlier today with 2.5L removed. Patient currently without complaints, appears comfortable but slightly lethargic on BIPAP.    -in the setting of relative hypotension and hypoglycemia, will give small volume D5 + NS (250cc @ 50cc/hr) for gentle fluid resuscitation and hypoglycemia management. extreme caution given CHF and ESRD      R2 Plan of Care Summary:  -admitted with Acute Hypoxic Respiratory Failure in the setting of Influenza infection: c/w BIPAP, wean as tolerated. c/w supportive care. patient's Pulmonologist is aware of patient, consult pending    -c/w home Dobutamine gtt @ 10. had elevated Trop on admission but downtrending now, nondiagnostic in the setting of ESRD. patient's Cardiologist is aware of patient, consult pending    -discussed findings and plan of care with patient and family at bedside    -FULL CODE, if patient becomes unstable will consult NINFA George M.D.  Medicine Resident, PGY-2  Pager: [phone]  Weekday PM after 7 pm and Weekends after 12, page [phone]

## 2018-02-14 NOTE — CONSULT NOTE ADULT - PROBLEM SELECTOR RECOMMENDATION 2
Chronic hypotension, at baseline values. COntinue midodrine.   Low BP limiting amount of target UF goal.

## 2018-02-14 NOTE — PROGRESS NOTE ADULT - PROBLEM SELECTOR PLAN 6
-c/w dobutamine gtts at 10 mcg/kg/hr  -f/u Dr. Davis recommendations  -does not appear to be in decompensated HF at this time. Not in need of urgent HD at this time. -c/w dobutamine gtts at 10 mcg/kg/hr  -f/u Dr. Davis recommendations  -does not appear to be in decompensated HF at this time. Cont to monitor for signs/sx of fluid overload

## 2018-02-14 NOTE — PROGRESS NOTE ADULT - PROBLEM SELECTOR PLAN 5
-patient for HD M/W/F. With worsening LE edema however s/p 3.5L fluid removal yesterday. Electrolytes acceptable. Patient not in need of urgent HD. Discussed plan with Dr. Ridley he will place patient on schedule for AM HD. -went for dialysis this AM, pt's outpt nephrologist Dr. Ridley following, appreciate recs  -c/w dialysis per nephro recs

## 2018-02-15 DIAGNOSIS — J96.01 ACUTE RESPIRATORY FAILURE WITH HYPOXIA: ICD-10-CM

## 2018-02-15 DIAGNOSIS — J96.90 RESPIRATORY FAILURE, UNSPECIFIED, UNSPECIFIED WHETHER WITH HYPOXIA OR HYPERCAPNIA: ICD-10-CM

## 2018-02-15 DIAGNOSIS — E11.649 TYPE 2 DIABETES MELLITUS WITH HYPOGLYCEMIA WITHOUT COMA: ICD-10-CM

## 2018-02-15 LAB
ALBUMIN SERPL ELPH-MCNC: 2.5 G/DL — LOW (ref 3.3–5)
ALP SERPL-CCNC: 173 U/L — HIGH (ref 40–120)
ALT FLD-CCNC: 37 U/L — SIGNIFICANT CHANGE UP (ref 4–41)
AST SERPL-CCNC: 83 U/L — HIGH (ref 4–40)
BILIRUB DIRECT SERPL-MCNC: 0.3 MG/DL — HIGH (ref 0.1–0.2)
BILIRUB SERPL-MCNC: 0.7 MG/DL — SIGNIFICANT CHANGE UP (ref 0.2–1.2)
BUN SERPL-MCNC: 27 MG/DL — HIGH (ref 7–23)
CALCIUM SERPL-MCNC: 8.1 MG/DL — LOW (ref 8.4–10.5)
CHLORIDE SERPL-SCNC: 97 MMOL/L — LOW (ref 98–107)
CO2 SERPL-SCNC: 26 MMOL/L — SIGNIFICANT CHANGE UP (ref 22–31)
CREAT SERPL-MCNC: 5.16 MG/DL — HIGH (ref 0.5–1.3)
GLUCOSE SERPL-MCNC: 69 MG/DL — LOW (ref 70–99)
HCT VFR BLD CALC: 36.2 % — LOW (ref 39–50)
HGB BLD-MCNC: 11.1 G/DL — LOW (ref 13–17)
INR BLD: 2.57 — HIGH (ref 0.88–1.17)
MAGNESIUM SERPL-MCNC: 1.9 MG/DL — SIGNIFICANT CHANGE UP (ref 1.6–2.6)
MCHC RBC-ENTMCNC: 30.1 PG — SIGNIFICANT CHANGE UP (ref 27–34)
MCHC RBC-ENTMCNC: 30.7 % — LOW (ref 32–36)
MCV RBC AUTO: 98.1 FL — SIGNIFICANT CHANGE UP (ref 80–100)
NRBC # FLD: 0 — SIGNIFICANT CHANGE UP
PHOSPHATE SERPL-MCNC: 2.9 MG/DL — SIGNIFICANT CHANGE UP (ref 2.5–4.5)
PLATELET # BLD AUTO: 91 K/UL — LOW (ref 150–400)
PMV BLD: 12.1 FL — SIGNIFICANT CHANGE UP (ref 7–13)
POTASSIUM SERPL-MCNC: 4.1 MMOL/L — SIGNIFICANT CHANGE UP (ref 3.5–5.3)
POTASSIUM SERPL-SCNC: 4.1 MMOL/L — SIGNIFICANT CHANGE UP (ref 3.5–5.3)
PROT SERPL-MCNC: 7.1 G/DL — SIGNIFICANT CHANGE UP (ref 6–8.3)
PROTHROM AB SERPL-ACNC: 30.1 SEC — HIGH (ref 9.8–13.1)
RBC # BLD: 3.69 M/UL — LOW (ref 4.2–5.8)
RBC # FLD: 16.8 % — HIGH (ref 10.3–14.5)
SODIUM SERPL-SCNC: 136 MMOL/L — SIGNIFICANT CHANGE UP (ref 135–145)
WBC # BLD: 4.76 K/UL — SIGNIFICANT CHANGE UP (ref 3.8–10.5)
WBC # FLD AUTO: 4.76 K/UL — SIGNIFICANT CHANGE UP (ref 3.8–10.5)

## 2018-02-15 PROCEDURE — 99233 SBSQ HOSP IP/OBS HIGH 50: CPT | Mod: GC

## 2018-02-15 RX ORDER — WARFARIN SODIUM 2.5 MG/1
3 TABLET ORAL ONCE
Qty: 0 | Refills: 0 | Status: COMPLETED | OUTPATIENT
Start: 2018-02-15 | End: 2018-02-15

## 2018-02-15 RX ADMIN — SODIUM CHLORIDE 50 MILLILITER(S): 9 INJECTION, SOLUTION INTRAVENOUS at 00:07

## 2018-02-15 RX ADMIN — Medication 81 MILLIGRAM(S): at 13:06

## 2018-02-15 RX ADMIN — Medication 75 MICROGRAM(S): at 07:01

## 2018-02-15 RX ADMIN — MIDODRINE HYDROCHLORIDE 10 MILLIGRAM(S): 2.5 TABLET ORAL at 07:01

## 2018-02-15 RX ADMIN — Medication 3 MILLILITER(S): at 13:48

## 2018-02-15 RX ADMIN — ATORVASTATIN CALCIUM 80 MILLIGRAM(S): 80 TABLET, FILM COATED ORAL at 21:59

## 2018-02-15 RX ADMIN — Medication 3 MILLILITER(S): at 17:08

## 2018-02-15 RX ADMIN — Medication 22.5 MICROGRAM(S)/KG/MIN: at 00:08

## 2018-02-15 RX ADMIN — Medication 3 MILLILITER(S): at 04:39

## 2018-02-15 RX ADMIN — MIDODRINE HYDROCHLORIDE 10 MILLIGRAM(S): 2.5 TABLET ORAL at 13:08

## 2018-02-15 RX ADMIN — BUDESONIDE AND FORMOTEROL FUMARATE DIHYDRATE 2 PUFF(S): 160; 4.5 AEROSOL RESPIRATORY (INHALATION) at 13:07

## 2018-02-15 RX ADMIN — Medication 1 TABLET(S): at 13:06

## 2018-02-15 RX ADMIN — MIDODRINE HYDROCHLORIDE 10 MILLIGRAM(S): 2.5 TABLET ORAL at 21:59

## 2018-02-15 RX ADMIN — Medication 3 MILLILITER(S): at 21:04

## 2018-02-15 RX ADMIN — Medication 3 MILLILITER(S): at 01:45

## 2018-02-15 RX ADMIN — WARFARIN SODIUM 3 MILLIGRAM(S): 2.5 TABLET ORAL at 18:11

## 2018-02-15 RX ADMIN — BUDESONIDE AND FORMOTEROL FUMARATE DIHYDRATE 2 PUFF(S): 160; 4.5 AEROSOL RESPIRATORY (INHALATION) at 21:53

## 2018-02-15 RX ADMIN — AMIODARONE HYDROCHLORIDE 100 MILLIGRAM(S): 400 TABLET ORAL at 13:07

## 2018-02-15 RX ADMIN — FINASTERIDE 5 MILLIGRAM(S): 5 TABLET, FILM COATED ORAL at 13:07

## 2018-02-15 NOTE — CONSULT NOTE ADULT - SUBJECTIVE AND OBJECTIVE BOX
I have seen and examined the patient and reviewed the history Pt was readmitted with Influenza this time: He was started on Bipap which improved his breathing He has underlying pulmonary fibrosis and h as been on dobutamine drip at home for NICM:   Patient is a 62y old  Male who presents with a chief complaint of SOB x few hours (13 Feb 2018 22:11)      HPI:  Patient is a 62 year old man with ESRD (HD MWF), NICM (EF 21%) s/p ICD, PICC line in place with home dobutamine drip, atrial fibrillation on coumadin, COPD on home O2 (4-5L), pulmonary fibrosis, hypotension on midodrine with recent admission 1/23-1/31 for diarrhea found to be positive for human meta pneumovirus who now returns to [facility] ER complaining of shortness of breath that started earlier this evening. He describes the sensation as his "oxygen not being enough him." He denies sick contacts or travel since last admission. He denies fever, chills, cough, chest pain, wheezing, abdominal pain, vomiting. He endorses LE edema worse than usual as well as one episode of non-bloody diarrhea. Patient states he tried his inhaler with no relief of SOB. He completed HD yesterday and had removal of 3.5L. He denies dietary indiscretion. Patient currently is on BiPAP and feels comfortable using the mask. Pt does not make urine.     In ER: T 98.4, HR 83, BP 91/52, RR 14, SpO2 98% on 40% FiO2 on BiPAP 12/5. Patient received dobutamine gtts at home dose in ER. RVP+ for influenza B infection. (13 Feb 2018 22:11)      ?FOLLOWING PRESENT  [ x] Hx of PE/DVT, [x ] Hx COPD, [x ] Hx of Asthma, x ] Hx of Hospitalization, [x ]  Hx of BiPAP/CPAP use, [ x] Hx of ALONZO    Allergies    No Known Allergies    Intolerances        PAST MEDICAL & SURGICAL HISTORY:  Seizure: Off Keppra since 7/2017  Chronic hypotension  AF (atrial fibrillation)  COPD (chronic obstructive pulmonary disease): 4L home O2  HLD (hyperlipidemia)  DM (diabetes mellitus): Off Insulin since 7/2017  ESRD (end stage renal disease) on dialysis  BPH (benign prostatic hypertrophy)  Myocardial infarction: 10/2011  Gout  CHF (congestive heart failure)  AICD (automatic cardioverter/defibrillator) present: Biotronic - placed 9/11/09  H/O coronary angiogram      FAMILY HISTORY:  No pertinent family history in first degree relatives      Social History: [x  ] TOBACCO                  [ x ] ETOH                                 [ x ] IVDA/DRUGS    REVIEW OF SYSTEMS      General:	x    Skin/Breast:x  	  Ophthalmologic:x  	  ENMT:	x    Respiratory and Thorax: sob, [place]   	  Cardiovascular:	x    Gastrointestinal:	x    Genitourinary:	x    Musculoskeletal:	x    Neurological:	x    Psychiatric:x	    Hematology/Lymphatics:	x    Endocrine:	x    Allergic/Immunologic:	x    MEDICATIONS  (STANDING):  ALBUTerol/ipratropium for Nebulization 3 milliLiter(s) Nebulizer every 4 hours  amiodarone    Tablet 100 milliGRAM(s) Oral daily  aspirin enteric coated 81 milliGRAM(s) Oral daily  atorvastatin 80 milliGRAM(s) Oral at bedtime  buDESOnide 160 MICROgram(s)/formoterol 4.5 MICROgram(s) Inhaler 2 Puff(s) Inhalation two times a day  dextrose 5%. 1000 milliLiter(s) (50 mL/Hr) IV Continuous <Continuous>  dextrose 50% Injectable 12.5 Gram(s) IV Push once  dextrose 50% Injectable 25 Gram(s) IV Push once  dextrose 50% Injectable 25 Gram(s) IV Push once  DOBUTamine Infusion 10 MICROgram(s)/kG/Min (22.5 mL/Hr) IV Continuous <Continuous>  docusate sodium 100 milliGRAM(s) Oral two times a day  finasteride 5 milliGRAM(s) Oral daily  levothyroxine 75 MICROGram(s) Oral daily  midodrine 10 milliGRAM(s) Oral three times a day  Nephro-lynda 1 Tablet(s) Oral daily  oseltamivir 30 milliGRAM(s) Oral <User Schedule>  polyethylene glycol 3350 17 Gram(s) Oral daily  senna 2 Tablet(s) Oral at bedtime    MEDICATIONS  (PRN):  dextrose Gel 1 Dose(s) Oral once PRN Blood Glucose LESS THAN 70 milliGRAM(s)/deciLiter  glucagon  Injectable 1 milliGRAM(s) IntraMuscular once PRN Glucose <70 milliGRAM(s)/deciLiter       Vital Signs Last 24 Hrs  T(C): 36.6 (15 Feb 2018 04:45), Max: 36.7 (14 Feb 2018 17:46)  T(F): 97.8 (15 Feb 2018 04:45), Max: 98.1 (14 Feb 2018 22:43)  HR: 98 (15 Feb 2018 07:04) (62 - 108)  BP: 90/48 (15 Feb 2018 04:45) (88/47 - 113/89)  BP(mean): --  RR: 17 (15 Feb 2018 04:45) (16 - 18)  SpO2: 98% (15 Feb 2018 07:04) (94% - 100%)        I&O's Summary    14 Feb 2018 07:01  -  15 Feb 2018 07:00  --------------------------------------------------------  IN: 400 mL / OUT: 2900 mL / NET: -2500 mL        Physical Exam:   GENERAL: NAD, well-groomed, well-developed  HEENT: BELL/   Atraumatic, Normocephalic  ENMT: No tonsillar erythema, exudates, or enlargement; Moist mucous membranes, Good dentition, No lesions  NECK: Supple, No JVD, Normal thyroid  CHEST/LUNG: SCATTERED CRACKLES +  CVS: Regular rate and rhythm; No murmurs, rubs, or gallops  GI: : Soft, Nontender, Nondistended; Bowel sounds present  NERVOUS SYSTEM:  Alert & Oriented X3  EXTREMITIES:  2+ Peripheral Pulses, No clubbing, cyanosis, or edema  LYMPH: No lymphadenopathy noted  SKIN: No rashes or lesions  ENDOCRINOLOGY: No Thyromegaly  PSYCH: Appropriate    Labs:  4.4<61<4>>26<<7.325>>4.4<<3><<4><<5<<269>>  CARDIAC MARKERS ( 14 Feb 2018 14:30 )  x     / 0.63 ng/mL / 373 u/L / 6.30 ng/mL / x      CARDIAC MARKERS ( 14 Feb 2018 04:40 )  x     / 0.68 ng/mL / 432 u/L / 6.90 ng/mL / x      CARDIAC MARKERS ( 13 Feb 2018 18:40 )  x     / 0.76 ng/mL / 575 u/L / 8.79 ng/mL / x                                11.1   4.76  )-----------( 91       ( 15 Feb 2018 06:00 )             36.2                         11.2   5.07  )-----------( 97       ( 14 Feb 2018 04:40 )             35.7                         12.7   6.42  )-----------( 110      ( 13 Feb 2018 18:40 )             40.8     02-15    136  |  97<L>  |  27<H>  ----------------------------<  69<L>  4.1   |  26  |  5.16<H>  02-14    137  |  95<L>  |  41<H>  ----------------------------<  88  4.2   |  26  |  6.37<H>  02-13    137  |  95<L>  |  35<H>  ----------------------------<  141<H>  4.9   |  27  |  5.96<H>    Ca    8.1<L>      15 Feb 2018 06:00  Ca    8.7      14 Feb 2018 04:40  Ca    8.7      13 Feb 2018 18:40  Phos  2.9     02-15  Phos  3.4     02-14  Mg     1.9     02-15  Mg     2.1     02-14    TPro  8.6<H>  /  Alb  3.3  /  TBili  0.7  /  DBili  x   /  AST  80<H>  /  ALT  52<H>  /  AlkPhos  233<H>  02-13    CAPILLARY BLOOD GLUCOSE      POCT Blood Glucose.: 90 mg/dL (14 Feb 2018 23:23)  POCT Blood Glucose.: 68 mg/dL (14 Feb 2018 22:27)  POCT Blood Glucose.: 95 mg/dL (14 Feb 2018 17:58)  POCT Blood Glucose.: 53 mg/dL (14 Feb 2018 17:20)  POCT Blood Glucose.: 75 mg/dL (14 Feb 2018 12:10)  POCT Blood Glucose.: 112 mg/dL (14 Feb 2018 09:27)  POCT Blood Glucose.: 77 mg/dL (14 Feb 2018 08:35)    LIVER FUNCTIONS - ( 13 Feb 2018 18:40 )  Alb: 3.3 g/dL / Pro: 8.6 g/dL / ALK PHOS: 233 u/L / ALT: 52 u/L / AST: 80 u/L / GGT: x           PT/INR - ( 15 Feb 2018 06:00 )   PT: 30.1 SEC;   INR: 2.57          PTT - ( 14 Feb 2018 04:40 )  PTT:43.8 SEC    D DImer    Cultures:           < from: CT Chest No Cont (06.05.17 @ 15:45) >  ND LARGE AIRWAYS: Small amount of secretions in the trachea.   Diffuse bilateral groundglass opacities with traction bronchiectasis   throughout the lungs with regions of polygonal sparing bilateral lower   andleft upper lobes. There is honeycombing scattered throughout the   lungs. Interlobular septal thickening is noted in the bilateral upper   lobes. Patchy tree-in-bud opacities are noted in the bilateral lower   lobes. Scattered small calcified granulomas.  PLEURA: Trace bilateral pleural effusions.  VESSELS: Atherosclerotic calcifications of the aorta and coronary   arteries.  HEART: Cardiomegaly. No pericardial effusion.  MEDIASTINUM AND ASH: No lymphadenopathy.  CHEST WALL AND LOWER NECK: Left chest wall AICD. Generalized anasarca.  UPPER ABDOMEN: Hyperdense material within the gallbladder may reflect   vicarious excretion of iodinated contrast from recent procedure.  BONES: Degenerative changes of the spine.    IMPRESSION:    Extensive pulmonary fibrosis.    Evidence of superimposed mild small airway disease.          < from: Xray Chest 1 View-PORTABLE IMMEDIATE (02.13.18 @ 18:58) >    INTERPRETATION:  CLINICAL INFORMATION: Shortness of breath. Evaluate for   fluid overload.    TECHNIQUE: AP view of the chest.    COMPARISON: Chest x-ray 1/27/2018.    FINDINGS:     Right IJ hemodialysis catheter terminates in the SVC. Single lead   left-sided AICD terminates in the right ventricle.  There is moderate pulmonary edema and small right pleural effusion. The   left costophrenic angle is not fully visualized.  The cardiomediastinal silhouette is enlarged.  The visualized osseous structures are unremarkable for age.    IMPRESSION:   Moderate pulmonary edema with small right pleural effusion. Left   costophrenic angle not fully visualized.              WERO RAZO M.D., RADIOLOGY RESIDENT  This document has been electronically signed.  MARINE HERNÁNDEZ M.D.; ATTENDING RADIOLOGIST  This document has been electronically signed. Feb 14 2018  8:20AM        < end of copied text >      LORI ESTRELLA M.D., RADIOLOGY RESIDENT  This document has been electronically signed.  TERESSA BAIRD M.D., ATTENDING RADIOLOGIST  This document has been electronically signed. Jun 5 2017  4:30PM        < end of copied text >              Rapid Respiratory Viral Panel Result        02-13 @ 18:40  Rapid RVP --  Coronovirus --  Adenovirus NOT DETECTED  Bordetella Pertussis NOT DETECTED  Chlamydia Pneumonia NOT DETECTED  Entero/RhinovirusNOT DETECTED  HKU1 Coronovirus --  HMPV Coronovirus NOT DETECTED  Influenza A NOT DETECTED (any subtype)  Influenza AH1 NOT DETECTED  Influenza AH1 2009 NOT DETECTED  Influenza AH3 NOT DETECTED  Influenza B POSITIVE  Mycoplasma Pneumoniae NOT DETECTED This nucleic acid amplification assay was performed using  the [employer] FilmArray. The following pathogens are tested  for: Adenovirus, Coronavirus 229E, Coronavirus HKU1,  Coronavirus NL63, Coronavirus OC43, Human Metapneumovirus  (HMPV), Rhinovirus/Enterovirus, Influenza A H1, Influenza A  H1 2009 (Pandemic H1 2009), Influenza A H3, Influenza A (Flu  A) subtype not identified, Influenza B, Parainfluenza Virus  (types 1, 2, 3, 4), Respiratory Syncytial Virus (RSV),  Bordetella pertussis, Chlamydophila pneumoniae, and  Mycoplasma pneumoniae. A negative FilmArray result does not  always exclude the possibility of viral or bacterial  infection. Laboratory results should always be interpreted  in the context of clinical findings.  NL63 Coronovirus --  OC43 Coronovirus --  Parainfluenza 1 NOT DETECTED  Parainfluenza 2 NOT DETECTED  Parainfluenza 3 NOT DETECTED  Parainfluenza 4 NOT DETECTED  Resp Syncytial Virus NOT DETECTED        Studies  Chest X-RAY  CT SCAN Chest   CT Abdomen  Venous Dopplers: LE:   Others

## 2018-02-15 NOTE — PROGRESS NOTE ADULT - SUBJECTIVE AND OBJECTIVE BOX
Patient is a 62y old  Male who presents with a chief complaint of SOB x few hours (13 Feb 2018 22:11)      SUBJECTIVE / OVERNIGHT EVENTS: patient was hypoglycemia and hypotensive overnight. This AM patient seen at bedside, awake and responsive. He wants to eat. He states his breathing has improved    REVIEW OF SYSTEMS:  CONSTITUTIONAL: No weakness, fevers or chills  EYES/ENT: No visual changes;  No vertigo or throat pain   NECK: No pain or stiffness  RESPIRATORY: +SOB  CARDIOVASCULAR: No chest pain or palpitations  GASTROINTESTINAL: No abdominal pain, nausea, vomiting, or diarrhea. No melena or hematochezia.  GENITOURINARY: No dysuria, frequency or hematuria  NEUROLOGICAL: No numbness or weakness  SKIN: No itching, burning, rashes, or lesions   All other review of systems is negative unless indicated above.    MEDICATIONS  (STANDING):  ALBUTerol/ipratropium for Nebulization 3 milliLiter(s) Nebulizer every 4 hours  amiodarone    Tablet 100 milliGRAM(s) Oral daily  aspirin enteric coated 81 milliGRAM(s) Oral daily  atorvastatin 80 milliGRAM(s) Oral at bedtime  buDESOnide 160 MICROgram(s)/formoterol 4.5 MICROgram(s) Inhaler 2 Puff(s) Inhalation two times a day  dextrose 5%. 1000 milliLiter(s) (50 mL/Hr) IV Continuous <Continuous>  dextrose 50% Injectable 12.5 Gram(s) IV Push once  dextrose 50% Injectable 25 Gram(s) IV Push once  dextrose 50% Injectable 25 Gram(s) IV Push once  DOBUTamine Infusion 10 MICROgram(s)/kG/Min (22.5 mL/Hr) IV Continuous <Continuous>  docusate sodium 100 milliGRAM(s) Oral two times a day  finasteride 5 milliGRAM(s) Oral daily  levothyroxine 75 MICROGram(s) Oral daily  midodrine 10 milliGRAM(s) Oral three times a day  Nephro-lynda 1 Tablet(s) Oral daily  oseltamivir 30 milliGRAM(s) Oral <User Schedule>  polyethylene glycol 3350 17 Gram(s) Oral daily  senna 2 Tablet(s) Oral at bedtime  warfarin 3 milliGRAM(s) Oral once    MEDICATIONS  (PRN):  dextrose Gel 1 Dose(s) Oral once PRN Blood Glucose LESS THAN 70 milliGRAM(s)/deciLiter  glucagon  Injectable 1 milliGRAM(s) IntraMuscular once PRN Glucose <70 milliGRAM(s)/deciLiter      T(C): 36.6 (02-15-18 @ 04:45), Max: 36.7 (02-14-18 @ 17:46)  HR: 99 (02-15-18 @ 09:05) (62 - 108)  BP: 90/48 (02-15-18 @ 04:45) (88/47 - 113/89)  RR: 17 (02-15-18 @ 04:45) (16 - 18)  SpO2: 96% (02-15-18 @ 09:05) (94% - 100%)    CAPILLARY BLOOD GLUCOSE      POCT Blood Glucose.: 67 mg/dL (15 Feb 2018 08:28)  POCT Blood Glucose.: 90 mg/dL (14 Feb 2018 23:23)  POCT Blood Glucose.: 68 mg/dL (14 Feb 2018 22:27)  POCT Blood Glucose.: 95 mg/dL (14 Feb 2018 17:58)  POCT Blood Glucose.: 53 mg/dL (14 Feb 2018 17:20)  POCT Blood Glucose.: 75 mg/dL (14 Feb 2018 12:10)  POCT Blood Glucose.: 112 mg/dL (14 Feb 2018 09:27)    I&O's Summary    14 Feb 2018 07:01  -  15 Feb 2018 07:00  --------------------------------------------------------  IN: 400 mL / OUT: 2900 mL / NET: -2500 mL        GENERAL: No acute distress, well-developed  HEAD:  Atraumatic, Normocephalic  ENT: EOMI, PERRLA, No JVD, moist mucosa  CHEST/LUNG: Clear to auscultation bilaterally  BACK: No spinal tenderness  HEART: Regular rate and rhythm; No murmurs, rubs, or gallops  ABDOMEN: Soft, Nontender, Nondistended; Bowel sounds present  EXTREMITIES:  No clubbing, cyanosis, or edema  PSYCH: Nl behavior, nl affect  NEUROLOGY: AAOx3, non-focal, cranial nerves intact  SKIN: Normal color, No rashes or lesions    LABS:                        11.1   4.76  )-----------( 91       ( 15 Feb 2018 06:00 )             36.2     02-15    136  |  97<L>  |  27<H>  ----------------------------<  69<L>  4.1   |  26  |  5.16<H>    Ca    8.1<L>      15 Feb 2018 06:00  Phos  2.9     02-15  Mg     1.9     02-15    TPro  8.6<H>  /  Alb  3.3  /  TBili  0.7  /  DBili  x   /  AST  80<H>  /  ALT  52<H>  /  AlkPhos  233<H>  02-13    PT/INR - ( 15 Feb 2018 06:00 )   PT: 30.1 SEC;   INR: 2.57          PTT - ( 14 Feb 2018 04:40 )  PTT:43.8 SEC  CARDIAC MARKERS ( 14 Feb 2018 14:30 )  x     / 0.63 ng/mL / 373 u/L / 6.30 ng/mL / x      CARDIAC MARKERS ( 14 Feb 2018 04:40 )  x     / 0.68 ng/mL / 432 u/L / 6.90 ng/mL / x      CARDIAC MARKERS ( 13 Feb 2018 18:40 )  x     / 0.76 ng/mL / 575 u/L / 8.79 ng/mL / x            RADIOLOGY & ADDITIONAL TESTS:  Imaging Personally Reviewed:  Consultant(s) Notes Reviewed:  Pulm, Cards  Care Discussed with Consultants/Other Providers:

## 2018-02-15 NOTE — PROGRESS NOTE ADULT - PROBLEM SELECTOR PLAN 9
-patient with recently dx T2DM not on medications  -c/w ISS and diet control -INR Therapeutic. daily PT/INR, dose Coumadin accordingly. plan for 3mg tonight  -c/w Amiodarone 100mg daily

## 2018-02-15 NOTE — CONSULT NOTE ADULT - SUBJECTIVE AND OBJECTIVE BOX
CHIEF COMPLAINT: Shortness of Breath    HPI:  Patient is a 62 year old man with ESRD (HD MWF), NICM (EF 21%) s/p ICD, PICC line in place with home dobutamine drip, atrial fibrillation on coumadin, COPD on home O2 (4-5L), pulmonary fibrosis, hypotension on midodrine with recent admission 1/23-1/31 for diarrhea found to be positive for human meta pneumovirus who now returns to [facility] ER complaining of shortness of breath that started earlier this evening. He describes the sensation as his "oxygen not being enough him." He denies sick contacts or travel since last admission. He denies fever, chills, cough, chest pain, wheezing, abdominal pain, vomiting. He endorses LE edema worse than usual as well as one episode of non-bloody diarrhea. Patient states he tried his inhaler with no relief of SOB.  He denies dietary indiscretion. Patient currently is on BiPAP and feels comfortable using the mask. Pt does not make urine.     In ER: T 98.4, HR 83, BP 91/52, RR 14, SpO2 98% on 40% FiO2 on BiPAP 12/5. Patient received dobutamine gtts at home dose in ER. RVP+ for influenza B infection. (13 Feb 2018 22:11)    Currently patient is on bipap without any chest pain or shortness of breath.      PAST MEDICAL & SURGICAL HISTORY:  Seizure: Off Keppra since 7/2017  Chronic hypotension  AF (atrial fibrillation)  COPD (chronic obstructive pulmonary disease): 4L home O2  HLD (hyperlipidemia)  DM (diabetes mellitus): Off Insulin since 7/2017  ESRD (end stage renal disease) on dialysis  BPH (benign prostatic hypertrophy)  Myocardial infarction: 10/2011  Gout  CHF (congestive heart failure)  AICD (automatic cardioverter/defibrillator) present: Biotronic - placed 9/11/09  H/O coronary angiogram      Allergies    No Known Allergies      SOCIAL HISTORY    Smoking Hx: None  ETOH Hx: None  Marital Status: Lives at home with wife  Occupational Hx: Retired    FAMILY HISTORY:  No pertinent family history in first degree relatives      MEDICATIONS:  ALBUTerol/ipratropium for Nebulization 3 milliLiter(s) Nebulizer every 4 hours  amiodarone    Tablet 100 milliGRAM(s) Oral daily  aspirin enteric coated 81 milliGRAM(s) Oral daily  atorvastatin 80 milliGRAM(s) Oral at bedtime  buDESOnide 160 MICROgram(s)/formoterol 4.5 MICROgram(s) Inhaler 2 Puff(s) Inhalation two times a day  dextrose 5%. 1000 milliLiter(s) IV Continuous <Continuous>  dextrose 50% Injectable 12.5 Gram(s) IV Push once  dextrose 50% Injectable 25 Gram(s) IV Push once  dextrose 50% Injectable 25 Gram(s) IV Push once  dextrose Gel 1 Dose(s) Oral once PRN  DOBUTamine Infusion 10 MICROgram(s)/kG/Min IV Continuous <Continuous>  docusate sodium 100 milliGRAM(s) Oral two times a day  finasteride 5 milliGRAM(s) Oral daily  glucagon  Injectable 1 milliGRAM(s) IntraMuscular once PRN  levothyroxine 75 MICROGram(s) Oral daily  midodrine 10 milliGRAM(s) Oral three times a day  Nephro-lynda 1 Tablet(s) Oral daily  oseltamivir 30 milliGRAM(s) Oral <User Schedule>  polyethylene glycol 3350 17 Gram(s) Oral daily  senna 2 Tablet(s) Oral at bedtime      REVIEW OF SYSTEMS:    CONSTITUTIONAL: No weakness, fevers or chills  EYES/ENT: No visual changes;  No vertigo or throat pain   NECK: No pain or stiffness  RESPIRATORY: No cough, wheezing, hemoptysis; No shortness of breath  CARDIOVASCULAR: No chest pain or palpitations  GASTROINTESTINAL: No abdominal or epigastric pain. No nausea, vomiting, or hematemesis; No diarrhea or constipation. No melena or hematochezia.  GENITOURINARY: No dysuria, frequency or hematuria  NEUROLOGICAL: No numbness or weakness  SKIN: No itching, burning, rashes, or lesions   All other review of systems is negative unless indicated above    Vital Signs Last 24 Hrs  T(C): 36.6 (15 Feb 2018 04:45), Max: 36.7 (14 Feb 2018 17:46)  T(F): 97.8 (15 Feb 2018 04:45), Max: 98.1 (14 Feb 2018 22:43)  HR: 98 (15 Feb 2018 07:04) (62 - 108)  BP: 90/48 (15 Feb 2018 04:45) (88/47 - 113/89)  BP(mean): --  RR: 17 (15 Feb 2018 04:45) (16 - 18)  SpO2: 98% (15 Feb 2018 07:04) (94% - 100%)    I&O's Summary    14 Feb 2018 07:01  -  15 Feb 2018 07:00  --------------------------------------------------------  IN: 400 mL / OUT: 2900 mL / NET: -2500 mL        PHYSICAL EXAM:    Constitutional: NAD, awake and alert, well-developed  HEENT: PERR, EOMI  Neck: soft and supple, No LAD, No JVD  Respiratory: Rhonchi bilaterally  Cardiovascular: Irregular rate and rhythm,varying S1 and normal S2,  no murmurs, gallops or rubs  Gastrointestinal: Bowel Sounds present, soft, nontender.   Extremities: No peripheral edema. No clubbing or cyanosis.  Vascular: 2+ peripheral pulses  Neurological: A/O x 3, no focal deficits  Musculoskeletal: no calf tenderness.  Skin: No rashes.      LABS: All Labs Reviewed:                        11.1   4.76  )-----------( 91       ( 15 Feb 2018 06:00 )             36.2                         11.2   5.07  )-----------( 97       ( 14 Feb 2018 04:40 )             35.7                         12.7   6.42  )-----------( 110      ( 13 Feb 2018 18:40 )             40.8     15 Feb 2018 06:00    136    |  97     |  27     ----------------------------<  69     4.1     |  26     |  5.16   14 Feb 2018 04:40    137    |  95     |  41     ----------------------------<  88     4.2     |  26     |  6.37   13 Feb 2018 18:40    137    |  95     |  35     ----------------------------<  141    4.9     |  27     |  5.96     Ca    8.1        15 Feb 2018 06:00  Ca    8.7        14 Feb 2018 04:40  Ca    8.7        13 Feb 2018 18:40  Phos  2.9       15 Feb 2018 06:00  Phos  3.4       14 Feb 2018 04:40  Mg     1.9       15 Feb 2018 06:00  Mg     2.1       14 Feb 2018 04:40    TPro  8.6    /  Alb  3.3    /  TBili  0.7    /  DBili  x      /  AST  80     /  ALT  52     /  AlkPhos  233    13 Feb 2018 18:40    PT/INR - ( 15 Feb 2018 06:00 )   PT: 30.1 SEC;   INR: 2.57          PTT - ( 14 Feb 2018 04:40 )  PTT:43.8 SEC  Blood Culture:     CARDIAC MARKERS:    CARDIAC MARKERS ( 14 Feb 2018 14:30 )  x     / 0.63 ng/mL / 373 u/L / 6.30 ng/mL / x      CARDIAC MARKERS ( 14 Feb 2018 04:40 )  x     / 0.68 ng/mL / 432 u/L / 6.90 ng/mL / x      CARDIAC MARKERS ( 13 Feb 2018 18:40 )  x     / 0.76 ng/mL / 575 u/L / 8.79 ng/mL / x            CKMB: 6.30 ng/mL (02-14 @ 14:30)    CKMB Relative Index: 1.7 (02-14 @ 14:30)          RADIOLOGY/EKG: Atrial fibrillation with moderate ventricular response

## 2018-02-15 NOTE — CHART NOTE - NSCHARTNOTEFT_GEN_A_CORE
Patient is a 62 year old man with ESRD (HD MWF), NICM (EF 21%) s/p ICD, PICC line in place with home dobutamine drip, atrial fibrillation on coumadin, COPD on home O2 (4-5L), pulmonary fibrosis, hypotension on midodrine admitted with SOB secondary to influenza B infection.     Patient status post 3L HD session today, SBP before dialysis 90/53. Patient received small amount of D5 + NS (250 cc at 50 cc/hr) this evening after he was hypoglycemic to 50's and BP 93/47. Repeat FSG at 22:43 was 68 and BP 88/47. Patient was given apple juice and Midodrine 10 mg dose. An hour later, FSG improved to 90, while BP remained relatively the same at 89/47.      Patient seen and evaluated at bedside. Patient mentating well and answering questions, with no complaints. Patient denies dizziness or lightheadedness, chest pain, or difficulty breathing. Patient laying comfortably in bed on BIPAP, no respiratory distress, lungs were clear to auscultation bilaterally. Initially BP was in 80's/40's. I returned around 30 minutes later, and BP improved to 95/49, continued to be asymptomatic. Other vitals: HR 90, O2 Saturation 99% on BIPAP.     Will hold off on any more fluids for now in setting of CHF and ESRD.      Will continue to monitor closely overnight.    Renetta Breen M.D.   [employer]  [id number]

## 2018-02-15 NOTE — PROGRESS NOTE ADULT - PROBLEM SELECTOR PLAN 6
-c/w dobutamine gtts at 10 mcg/kg/hr  -f/u Dr. Davis recommendations  -does not appear to be in decompensated HF at this time. Cont to monitor for signs/sx of fluid overload -c/w dialysis per nephro recs  -caution with IVF

## 2018-02-15 NOTE — CONSULT NOTE ADULT - PROBLEM SELECTOR RECOMMENDATION 9
Has underlying Pulmonary fibrosis as well as COPD and has been onhome oxygen: the respiratory failure is likely precipitated by Influenza: Cont bipap prn in daytime and full time at night time: Has acute on chronic hypercapnic respiratory failure: pt is alert and awake at this time with no respiratory distress: Hold bipap in the daytime, if he can tolerate it. monitor h is blood glucose:  per primary team:

## 2018-02-15 NOTE — PROGRESS NOTE ADULT - PROBLEM SELECTOR PLAN 1
Resp distress most likely 2/2 influenza B. C/w supportive care  - c/w tamiflu M/W/F after dialysis for 5 days   -continue dialysis   -trial off BiPAP after dialysis  -afebrile and hemodynamically stable (has chronic hypotension) -RVP+  -c/w Tamiflu for 5 days total  -supportive care

## 2018-02-15 NOTE — PROGRESS NOTE ADULT - PROBLEM SELECTOR PLAN 3
-continue with duoneb q 4 hours standing and BiPAP for now. No signs of COPD exacerbation, no wheezing.   -tamiflu for influenza infection  -c/w symbicort,  -should patient worsen, can dose steroids and possibly azithromycin  -pt's outpt pulmonologist Dr. Coffman consulted, f/u recs -in the setting of decreased PO intake while on BIPAP  -has required multiple hypoglycemia interventions  -will transition O2 delivery to allow for reliable PO intake  -d/c ISS, c/w FS q6h

## 2018-02-15 NOTE — PROGRESS NOTE ADULT - ASSESSMENT
62y Male with ESRD on HD MWF, NICM with severe LV dysfnxn (EF 21%), s/p biotronic ICD, on home dobutamine drip, Afib on coumadin, COPD, pulm fibrosis on 3-4L home O2, DM, and chronic hypotension on midodrine admitted with SOB 2/2 pulm edema and Flu.

## 2018-02-15 NOTE — PROGRESS NOTE ADULT - SUBJECTIVE AND OBJECTIVE BOX
Pt seen and examined at bedside  Overnight events noted, including hypotension and hypoglycemia.  Pt feeling better overall. Cough and dyspnea improved.     Allergies:  No Known Allergies    Hospital Medications:   MEDICATIONS  (STANDING):  ALBUTerol/ipratropium for Nebulization 3 milliLiter(s) Nebulizer every 4 hours  amiodarone    Tablet 100 milliGRAM(s) Oral daily  aspirin enteric coated 81 milliGRAM(s) Oral daily  atorvastatin 80 milliGRAM(s) Oral at bedtime  buDESOnide 160 MICROgram(s)/formoterol 4.5 MICROgram(s) Inhaler 2 Puff(s) Inhalation two times a day  dextrose 5%. 1000 milliLiter(s) (50 mL/Hr) IV Continuous <Continuous>  dextrose 50% Injectable 12.5 Gram(s) IV Push once  dextrose 50% Injectable 25 Gram(s) IV Push once  dextrose 50% Injectable 25 Gram(s) IV Push once  DOBUTamine Infusion 10 MICROgram(s)/kG/Min (22.5 mL/Hr) IV Continuous <Continuous>  docusate sodium 100 milliGRAM(s) Oral two times a day  finasteride 5 milliGRAM(s) Oral daily  levothyroxine 75 MICROGram(s) Oral daily  midodrine 10 milliGRAM(s) Oral three times a day  Nephro-lynda 1 Tablet(s) Oral daily  oseltamivir 30 milliGRAM(s) Oral <User Schedule>  polyethylene glycol 3350 17 Gram(s) Oral daily  senna 2 Tablet(s) Oral at bedtime  warfarin 3 milliGRAM(s) Oral once    VITALS:  T(F): 97.9 (02-15-18 @ 13:03), Max: 98.1 (02-14-18 @ 22:43)  HR: 82 (02-15-18 @ 13:48)  BP: 82/45 (02-15-18 @ 13:03)  RR: 18 (02-15-18 @ 13:03)  SpO2: 100% (02-15-18 @ 13:03)  Wt(kg): --    02-14 @ 07:01  -  02-15 @ 07:00  --------------------------------------------------------  IN: 400 mL / OUT: 2900 mL / NET: -2500 mL    PHYSICAL EXAM:  Constitutional: NAD  HEENT: anicteric sclera, oropharynx clear, MMM  Neck: No JVD  Respiratory: Bibasilar crackles. Poor inspiratory effort  Cardiovascular: S1, S2, RRR  Gastrointestinal: BS+, soft, NT/ND  Extremities: No cyanosis or clubbing. Trace peripheral LE edema  Neurological: A/O x 3, no focal deficits  Psychiatric: Normal mood, normal affect  : No CVA tenderness. No rosa.   Skin: No rashes  Vascular Access: RIJ tunneled cath       LABS:  02-15    136  |  97<L>  |  27<H>  ----------------------------<  69<L>  4.1   |  26  |  5.16<H>    Ca    8.1<L>      15 Feb 2018 06:00  Phos  2.9     02-15  Mg     1.9     02-15    TPro  7.1  /  Alb  2.5<L>  /  TBili  0.7  /  DBili  0.3<H>  /  AST  83<H>  /  ALT  37  /  AlkPhos  173<H>  02-15    Creatinine Trend: 5.16 <--, 6.37 <--, 5.96 <--                        11.1   4.76  )-----------( 91       ( 15 Feb 2018 06:00 )             36.2     Urine Studies:      RADIOLOGY & ADDITIONAL STUDIES:

## 2018-02-15 NOTE — PROGRESS NOTE ADULT - PROBLEM SELECTOR PLAN 8
-continue with coumadin and amiodarone for anticoagulation and rate control, respectively. -c/w midodrine for chronic hypotension

## 2018-02-15 NOTE — PROGRESS NOTE ADULT - PROBLEM SELECTOR PLAN 10
-DVT PPX: on Coumadin for AFib        Maurice George M.D.  Medicine Resident, PGY-2  Pager: [phone]  Weekday PM after 7 pm and Weekends after 12, page [phone]

## 2018-02-15 NOTE — PROGRESS NOTE ADULT - PROBLEM SELECTOR PLAN 1
Maintenance HD schedule MWF.  Tolerating HD yesterday with 2.5 UF goal achieved  Electrolytes acceptable.   Given recent episode of hypotension, will not schedule for PUF today. Continue regular HD tomorrow. UF goal 2-2.5kg as BP tolerates.   Continue midodrine.

## 2018-02-15 NOTE — PROGRESS NOTE ADULT - ASSESSMENT
62 year old man with ESRD (HD MWF), NICM (EF 21%) s/p ICD, PICC line in place with home dobutamine drip, atrial fibrillation on coumadin, COPD on home O2 (4-5L), pulmonary fibrosis, hypotension on midodrine admitted with SOB secondary to influenza B infection. 61yo M with ESRD (M/W/F), NICM (EF 21%, s/p ICD, PICC with home dobutamine gtt), AFib (on coumadin), COPD on home O2 (4-5L), pulmonary fibrosis, hypotension (on midodrine) admitted with Acute on Chronic Respiratory Failure secondary to influenza B infection exacerbating underlying pulmonary fibrosis/COPD.

## 2018-02-15 NOTE — PROGRESS NOTE ADULT - PROBLEM SELECTOR PLAN 4
-patient on amiodarone with severe pulmonary fibrosis and very little reserve  -previous notes document that if increased FiO2 requirements, dc amiodarone  -patient with influenza b infection at this time which is likely the cause of his respiratory distress  -will c/w amiodarone for now and f/u recommendations from pt's outpt cardiologist, Dr. Davis. -continue with duoneb q 4 hours standing and BiPAP for now. No signs of COPD exacerbation, no wheezing.   -tamiflu for influenza infection  -c/w symbicort,  -should patient worsen, can dose steroids and possibly azithromycin  -pt's outpt pulmonologist Dr. Coffman consulted, f/u recs

## 2018-02-15 NOTE — PROGRESS NOTE ADULT - PROBLEM SELECTOR PLAN 7
-c/w midodrine for chronic hypotension -no acute exacerbation  -c/w dobutamine gtts at 10 mcg/kg/hr  -appreciate Cards recs

## 2018-02-15 NOTE — PROGRESS NOTE ADULT - PROBLEM SELECTOR PLAN 5
-went for dialysis this AM, pt's outpt nephrologist Dr. Ridley following, appreciate recs  -c/w dialysis per nephro recs -patient on amiodarone with severe pulmonary fibrosis and very little reserve  -previous notes document that if increased FiO2 requirements, dc amiodarone  -patient with influenza b infection at this time which is likely the cause of his respiratory distress  -will c/w amiodarone for now and f/u recommendations from pt's outpt cardiologist, Dr. Davis.

## 2018-02-15 NOTE — CONSULT NOTE ADULT - PROBLEM SELECTOR RECOMMENDATION 3
pt has had extensive pulmonary fibrosis: Per pts sister: genny I had spoken to her in 2017: he did have lung biopsy : but he was not on any specific treatment: He is on 4 L of oxygen at home: per prior notes. Spoke to his wife : he does have pulmonary fibrosis : he did have biopsy in 2016: he was told he has ILD and the treatment they gave her did not work; including prednisone: He is on oxygen at home: And he does have COPD too: Given his extensive pulmonary history and extensive pulmonary fibrosis: he is not a good candidate for amiodarone: Not much reserve in him left:  2/15: he has been on low dose amiodarone per cardiology: Cont to monitor his respiratory status:

## 2018-02-15 NOTE — PROGRESS NOTE ADULT - PROBLEM SELECTOR PLAN 2
-patient with elevated CK, CKMB and troponin which are likely elevated in setting of acute infection in patient with very poor cardiorespiratory reserve given severe HF and COPD and pulmonary fibrosis. Troponins also unreliable for cardiac ischemia in setting of ESRD  -patient is without any chest pain, BP is at baseline, EKG without changes compared to prior  -will continue to monitor -COPD/Pulmonary Fibrosis exacerbated in the setting of Influenza  -wean BIPAP off for daytime, c/w nocturnal BIPAP  -c/w standing DuoNebs q4h  -appreciate Pulm recs

## 2018-02-16 LAB
ALBUMIN SERPL ELPH-MCNC: 2.5 G/DL — LOW (ref 3.3–5)
ALP SERPL-CCNC: 185 U/L — HIGH (ref 40–120)
ALT FLD-CCNC: 44 U/L — HIGH (ref 4–41)
APTT BLD: 55.7 SEC — HIGH (ref 27.5–37.4)
AST SERPL-CCNC: 78 U/L — HIGH (ref 4–40)
BASOPHILS # BLD AUTO: 0.01 K/UL — SIGNIFICANT CHANGE UP (ref 0–0.2)
BASOPHILS NFR BLD AUTO: 0.3 % — SIGNIFICANT CHANGE UP (ref 0–2)
BILIRUB SERPL-MCNC: 0.6 MG/DL — SIGNIFICANT CHANGE UP (ref 0.2–1.2)
BUN SERPL-MCNC: 37 MG/DL — HIGH (ref 7–23)
CALCIUM SERPL-MCNC: 8 MG/DL — LOW (ref 8.4–10.5)
CHLORIDE SERPL-SCNC: 92 MMOL/L — LOW (ref 98–107)
CO2 SERPL-SCNC: 24 MMOL/L — SIGNIFICANT CHANGE UP (ref 22–31)
CREAT SERPL-MCNC: 6.56 MG/DL — HIGH (ref 0.5–1.3)
EOSINOPHIL # BLD AUTO: 0.11 K/UL — SIGNIFICANT CHANGE UP (ref 0–0.5)
EOSINOPHIL NFR BLD AUTO: 3.1 % — SIGNIFICANT CHANGE UP (ref 0–6)
GLUCOSE SERPL-MCNC: 116 MG/DL — HIGH (ref 70–99)
HCT VFR BLD CALC: 34.8 % — LOW (ref 39–50)
HGB BLD-MCNC: 10.8 G/DL — LOW (ref 13–17)
IMM GRANULOCYTES # BLD AUTO: 0.01 # — SIGNIFICANT CHANGE UP
IMM GRANULOCYTES NFR BLD AUTO: 0.3 % — SIGNIFICANT CHANGE UP (ref 0–1.5)
INR BLD: 3.92 — HIGH (ref 0.88–1.17)
LACTATE SERPL-SCNC: 1.1 MMOL/L — SIGNIFICANT CHANGE UP (ref 0.5–2)
LYMPHOCYTES # BLD AUTO: 0.86 K/UL — LOW (ref 1–3.3)
LYMPHOCYTES # BLD AUTO: 23.9 % — SIGNIFICANT CHANGE UP (ref 13–44)
MAGNESIUM SERPL-MCNC: 2 MG/DL — SIGNIFICANT CHANGE UP (ref 1.6–2.6)
MCHC RBC-ENTMCNC: 30.1 PG — SIGNIFICANT CHANGE UP (ref 27–34)
MCHC RBC-ENTMCNC: 31 % — LOW (ref 32–36)
MCV RBC AUTO: 96.9 FL — SIGNIFICANT CHANGE UP (ref 80–100)
MONOCYTES # BLD AUTO: 0.5 K/UL — SIGNIFICANT CHANGE UP (ref 0–0.9)
MONOCYTES NFR BLD AUTO: 13.9 % — SIGNIFICANT CHANGE UP (ref 2–14)
NEUTROPHILS # BLD AUTO: 2.11 K/UL — SIGNIFICANT CHANGE UP (ref 1.8–7.4)
NEUTROPHILS NFR BLD AUTO: 58.5 % — SIGNIFICANT CHANGE UP (ref 43–77)
NRBC # FLD: 0 — SIGNIFICANT CHANGE UP
PHOSPHATE SERPL-MCNC: 3.8 MG/DL — SIGNIFICANT CHANGE UP (ref 2.5–4.5)
PLATELET # BLD AUTO: 89 K/UL — LOW (ref 150–400)
PMV BLD: 12.6 FL — SIGNIFICANT CHANGE UP (ref 7–13)
POTASSIUM SERPL-MCNC: 3.9 MMOL/L — SIGNIFICANT CHANGE UP (ref 3.5–5.3)
POTASSIUM SERPL-SCNC: 3.9 MMOL/L — SIGNIFICANT CHANGE UP (ref 3.5–5.3)
PROT SERPL-MCNC: 7.1 G/DL — SIGNIFICANT CHANGE UP (ref 6–8.3)
PROTHROM AB SERPL-ACNC: 44.8 SEC — HIGH (ref 9.8–13.1)
RBC # BLD: 3.59 M/UL — LOW (ref 4.2–5.8)
RBC # FLD: 16.7 % — HIGH (ref 10.3–14.5)
SODIUM SERPL-SCNC: 131 MMOL/L — LOW (ref 135–145)
WBC # BLD: 3.6 K/UL — LOW (ref 3.8–10.5)
WBC # FLD AUTO: 3.6 K/UL — LOW (ref 3.8–10.5)

## 2018-02-16 PROCEDURE — 99233 SBSQ HOSP IP/OBS HIGH 50: CPT | Mod: GC

## 2018-02-16 RX ADMIN — Medication 3 MILLILITER(S): at 04:06

## 2018-02-16 RX ADMIN — Medication 75 MICROGRAM(S): at 05:54

## 2018-02-16 RX ADMIN — Medication 22.5 MICROGRAM(S)/KG/MIN: at 05:46

## 2018-02-16 RX ADMIN — AMIODARONE HYDROCHLORIDE 100 MILLIGRAM(S): 400 TABLET ORAL at 05:54

## 2018-02-16 RX ADMIN — BUDESONIDE AND FORMOTEROL FUMARATE DIHYDRATE 2 PUFF(S): 160; 4.5 AEROSOL RESPIRATORY (INHALATION) at 09:45

## 2018-02-16 RX ADMIN — Medication 3 MILLILITER(S): at 08:31

## 2018-02-16 RX ADMIN — Medication 81 MILLIGRAM(S): at 13:13

## 2018-02-16 RX ADMIN — Medication 30 MILLIGRAM(S): at 13:13

## 2018-02-16 RX ADMIN — MIDODRINE HYDROCHLORIDE 10 MILLIGRAM(S): 2.5 TABLET ORAL at 05:55

## 2018-02-16 RX ADMIN — Medication 22.5 MICROGRAM(S)/KG/MIN: at 13:13

## 2018-02-16 RX ADMIN — Medication 3 MILLILITER(S): at 02:28

## 2018-02-16 RX ADMIN — Medication 3 MILLILITER(S): at 12:17

## 2018-02-16 RX ADMIN — Medication 1 TABLET(S): at 13:13

## 2018-02-16 RX ADMIN — FINASTERIDE 5 MILLIGRAM(S): 5 TABLET, FILM COATED ORAL at 13:13

## 2018-02-16 RX ADMIN — Medication 22.5 MICROGRAM(S)/KG/MIN: at 18:15

## 2018-02-16 RX ADMIN — Medication 3 MILLILITER(S): at 16:15

## 2018-02-16 RX ADMIN — MIDODRINE HYDROCHLORIDE 10 MILLIGRAM(S): 2.5 TABLET ORAL at 13:13

## 2018-02-16 NOTE — CHART NOTE - NSCHARTNOTEFT_GEN_A_CORE
62 year old man with ESRD (HD MWF), NICM (EF 21%) s/p ICD, PICC line in place with home dobutamine drip, atrial fibrillation on coumadin, COPD on home O2 (4-5L), pulmonary fibrosis, hypotension on midodrine admitted with SOB secondary to influenza B infection.     Patient had 8 beats of non-sustained Vtach at 1:39 am, BP read was 79/39, HR 84. Patient asymptomatic, and nurse notified me. I went to see patient at bedside at the time. Patient laying comfortably in bed, singing to music that he was listening to on his phone. Patient said he had no complaints at the time, denied dizziness, lightheadedness, chest pain or shortness of breath. I repeated his vitals: /65, HR 85, sating above 95% on BIPAP. Lungs and cardiac exam normal.     Patient's BP this morning 75/40, asymptomatic. Notified by the nurse, who will give patient his midodrine and recheck his BP.     Will continue to monitor closely.       Renetta Breen   Night Float Intern   [id number] 62 year old man with ESRD (HD MWF), NICM (EF 21%) s/p ICD, PICC line in place with home dobutamine drip, atrial fibrillation on coumadin, COPD on home O2 (4-5L), pulmonary fibrosis, hypotension on midodrine admitted with SOB secondary to influenza B infection.     Patient had 8 beats of non-sustained Vtach at 1:39 am, BP read was 79/39, HR 84. Patient asymptomatic, and nurse notified me. I went to see patient at bedside at the time. Patient laying comfortably in bed, singing to music that he was listening to on his phone. Patient said he had no complaints at the time, denied dizziness, lightheadedness, chest pain or shortness of breath. I repeated his vitals: /55, HR 85, sating above 95% on BIPAP. Lungs and cardiac exam normal.     Patient's BP this morning 75/40, asymptomatic. Notified by the nurse, who will give patient his midodrine and recheck his BP.     Will continue to monitor closely.       Renetta Breen   Night Float Intern   [id number] 62 year old man with ESRD (HD MWF), NICM (EF 21%) s/p ICD, PICC line in place with home dobutamine drip, atrial fibrillation on coumadin, COPD on home O2 (4-5L), pulmonary fibrosis, hypotension on midodrine admitted with SOB secondary to influenza B infection.     Patient had 8 beats of non-sustained Vtach at 1:39 am, BP read was 79/39, HR 84. Patient asymptomatic, and nurse notified me. I evaluated patient at bedside at the time. Patient laying comfortably in bed, singing to music that he was listening to on his phone. Patient said he had no complaints at the time, denied dizziness, lightheadedness, chest pain or shortness of breath. I repeated his vitals: /55, HR 85, sating above 95% on BIPAP. Lungs and cardiac exam normal.     Patient's BP this morning 75/40, asymptomatic. I evaluated patient at bedside, asymptomatic, resting comfortably in bed. Repeat Vitals: /65. HR 75.     Will continue to monitor.       Renetta Breen   Night Float Intern   [id number]

## 2018-02-16 NOTE — PROGRESS NOTE ADULT - PROBLEM SELECTOR PLAN 1
Maintenance HD schedule MWF.  Plan for HD today. UF goal ~2kg, as BP tolerated.   Electrolytes acceptable.   Continue midodrine.

## 2018-02-16 NOTE — PROGRESS NOTE ADULT - PROBLEM SELECTOR PLAN 5
-patient on amiodarone with severe pulmonary fibrosis and very little reserve  -previous notes document that if increased FiO2 requirements, dc amiodarone  -patient with influenza b infection at this time which is likely the cause of his respiratory distress  -will c/w amiodarone for now and f/u recommendations from pt's outpt cardiologist, Dr. Davis. -patient on amiodarone with severe pulmonary fibrosis and very little reserve  -previous notes document that if increased FiO2 requirements, dc amiodarone  -patient with influenza b infection at this time which is likely the cause of his respiratory distress  -will c/w amiodarone for now appreciate recommendations from pt's outpt cardiologist, Dr. Davis.

## 2018-02-16 NOTE — PROGRESS NOTE ADULT - PROBLEM SELECTOR PLAN 3
-in the setting of decreased PO intake while on BIPAP  -has required multiple hypoglycemia interventions  -will transition O2 delivery to allow for reliable PO intake  -d/c ISS, c/w FS q6h -in the setting of decreased PO intake while on BIPAP, has required multiple hypoglycemia interventions  -now on ventimask and taking PO regularly  - FS q6h, add ISS if blood sugars elevated

## 2018-02-16 NOTE — PROGRESS NOTE ADULT - SUBJECTIVE AND OBJECTIVE BOX
Pt seen and examined at bedside  Pt reports dyspnea improved, and cough also better.     Allergies:  No Known Allergies    Hospital Medications:   MEDICATIONS  (STANDING):  ALBUTerol/ipratropium for Nebulization 3 milliLiter(s) Nebulizer every 4 hours  amiodarone    Tablet 100 milliGRAM(s) Oral daily  aspirin enteric coated 81 milliGRAM(s) Oral daily  atorvastatin 80 milliGRAM(s) Oral at bedtime  buDESOnide 160 MICROgram(s)/formoterol 4.5 MICROgram(s) Inhaler 2 Puff(s) Inhalation two times a day  dextrose 5%. 1000 milliLiter(s) (50 mL/Hr) IV Continuous <Continuous>  dextrose 50% Injectable 12.5 Gram(s) IV Push once  dextrose 50% Injectable 25 Gram(s) IV Push once  dextrose 50% Injectable 25 Gram(s) IV Push once  DOBUTamine Infusion 10 MICROgram(s)/kG/Min (22.5 mL/Hr) IV Continuous <Continuous>  docusate sodium 100 milliGRAM(s) Oral two times a day  finasteride 5 milliGRAM(s) Oral daily  levothyroxine 75 MICROGram(s) Oral daily  midodrine 10 milliGRAM(s) Oral three times a day  Nephro-lynda 1 Tablet(s) Oral daily  oseltamivir 30 milliGRAM(s) Oral <User Schedule>  polyethylene glycol 3350 17 Gram(s) Oral daily  senna 2 Tablet(s) Oral at bedtime    VITALS:  T(F): 97.5 (02-16-18 @ 05:44), Max: 98.5 (02-15-18 @ 18:08)  HR: 85 (02-16-18 @ 12:17)  BP: 75/40 (02-16-18 @ 05:44)  RR: 18 (02-16-18 @ 05:44)  SpO2: 95% (02-16-18 @ 12:17)  Wt(kg): --    PHYSICAL EXAM:  Constitutional: NAD  HEENT: anicteric sclera, oropharynx clear, MMM  Neck: No JVD  Respiratory: Minimal bibasilar crackles.   Cardiovascular: S1, S2, RRR  Gastrointestinal: BS+, soft, NT/ND  Extremities: No cyanosis or clubbing. No peripheral LE edema  Neurological: A/O x 3, no focal deficits  Psychiatric: Normal mood, normal affect  : No CVA tenderness. No rosa.   Skin: No rashes  Vascular Access: RIJ tunneled cath     LABS:  02-16    131<L>  |  92<L>  |  37<H>  ----------------------------<  116<H>  3.9   |  24  |  6.56<H>    Ca    8.0<L>      16 Feb 2018 07:00  Phos  3.8     02-16  Mg     2.0     02-16    TPro  7.1  /  Alb  2.5<L>  /  TBili  0.6  /  DBili      /  AST  78<H>  /  ALT  44<H>  /  AlkPhos  185<H>  02-16    Creatinine Trend: 6.56 <--, 5.16 <--, 6.37 <--, 5.96 <--                        10.8   3.60  )-----------( 89       ( 16 Feb 2018 07:00 )             34.8     Urine Studies:      RADIOLOGY & ADDITIONAL STUDIES:

## 2018-02-16 NOTE — PROGRESS NOTE ADULT - PROBLEM SELECTOR PLAN 1
has underlying pulmonary fibrosis and respiratory failure precipitated by influenza: Did use bipap last night: Try to wean him off bipap: If necessary , use high flow oxygen

## 2018-02-16 NOTE — PROGRESS NOTE ADULT - PROBLEM SELECTOR PLAN 10
-DVT PPX: on Coumadin for AFib        Maurice George M.D.  Medicine Resident, PGY-2  Pager: [phone]  Weekday PM after 7 pm and Weekends after 12, page [phone] -DVT PPX: on Coumadin for AFib        Parul Marcos  Pager: [phone]  Weekday PM after 7 pm and Weekends after 12, page [phone]

## 2018-02-16 NOTE — PROGRESS NOTE ADULT - SUBJECTIVE AND OBJECTIVE BOX
Patient is a 62y old  Male who presents with a chief complaint of SOB x few hours (13 Feb 2018 22:11)      Any change in ROS:   pt is doing ok   sob is reasonable      MEDICATIONS  (STANDING):  ALBUTerol/ipratropium for Nebulization 3 milliLiter(s) Nebulizer every 4 hours  amiodarone    Tablet 100 milliGRAM(s) Oral daily  aspirin enteric coated 81 milliGRAM(s) Oral daily  atorvastatin 80 milliGRAM(s) Oral at bedtime  buDESOnide 160 MICROgram(s)/formoterol 4.5 MICROgram(s) Inhaler 2 Puff(s) Inhalation two times a day  dextrose 5%. 1000 milliLiter(s) (50 mL/Hr) IV Continuous <Continuous>  dextrose 50% Injectable 12.5 Gram(s) IV Push once  dextrose 50% Injectable 25 Gram(s) IV Push once  dextrose 50% Injectable 25 Gram(s) IV Push once  DOBUTamine Infusion 10 MICROgram(s)/kG/Min (22.5 mL/Hr) IV Continuous <Continuous>  docusate sodium 100 milliGRAM(s) Oral two times a day  finasteride 5 milliGRAM(s) Oral daily  levothyroxine 75 MICROGram(s) Oral daily  midodrine 10 milliGRAM(s) Oral three times a day  Nephro-lynda 1 Tablet(s) Oral daily  polyethylene glycol 3350 17 Gram(s) Oral daily  senna 2 Tablet(s) Oral at bedtime    MEDICATIONS  (PRN):  dextrose Gel 1 Dose(s) Oral once PRN Blood Glucose LESS THAN 70 milliGRAM(s)/deciLiter  glucagon  Injectable 1 milliGRAM(s) IntraMuscular once PRN Glucose <70 milliGRAM(s)/deciLiter    Vital Signs Last 24 Hrs  T(C): 36.4 (16 Feb 2018 12:55), Max: 36.9 (15 Feb 2018 18:08)  T(F): 97.5 (16 Feb 2018 12:55), Max: 98.5 (15 Feb 2018 18:08)  HR: 89 (16 Feb 2018 12:55) (78 - 94)  BP: 95/41 (16 Feb 2018 12:55) (75/40 - 103/43)  BP(mean): --  RR: 18 (16 Feb 2018 12:55) (18 - 18)  SpO2: 97% (16 Feb 2018 12:55) (94% - 100%)    I&O's Summary        Physical Exam:   GENERAL: NAD, well-groomed, well-developed  HEENT: BELL/   Atraumatic, Normocephalic  ENMT: No tonsillar erythema, exudates, or enlargement; Moist mucous membranes, Good dentition, No lesions  NECK: Supple, No JVD, Normal thyroid  CHEST/LUNG: crackles at bilateral bases   CVS: Regular rate and rhythm; No murmurs, rubs, or gallops  GI: : Soft, Nontender, Nondistended; Bowel sounds present  NERVOUS SYSTEM:  Alert & Oriented X3  EXTREMITIES:  2+ Peripheral Pulses, No clubbing, cyanosis, or edema  LYMPH: No lymphadenopathy noted  SKIN: No rashes or lesions  ENDOCRINOLOGY: No Thyromegaly  PSYCH: Appropriate    Labs:  26                            10.8   3.60  )-----------( 89       ( 16 Feb 2018 07:00 )             34.8                         11.1   4.76  )-----------( 91       ( 15 Feb 2018 06:00 )             36.2                         11.2   5.07  )-----------( 97       ( 14 Feb 2018 04:40 )             35.7                         12.7   6.42  )-----------( 110      ( 13 Feb 2018 18:40 )             40.8     02-16    131<L>  |  92<L>  |  37<H>  ----------------------------<  116<H>  3.9   |  24  |  6.56<H>  02-15    136  |  97<L>  |  27<H>  ----------------------------<  69<L>  4.1   |  26  |  5.16<H>  02-14    137  |  95<L>  |  41<H>  ----------------------------<  88  4.2   |  26  |  6.37<H>  02-13    137  |  95<L>  |  35<H>  ----------------------------<  141<H>  4.9   |  27  |  5.96<H>    Ca    8.0<L>      16 Feb 2018 07:00  Ca    8.1<L>      15 Feb 2018 06:00  Phos  3.8     02-16  Phos  2.9     02-15  Mg     2.0     02-16  Mg     1.9     02-15    TPro  7.1  /  Alb  2.5<L>  /  TBili  0.6  /  DBili  x   /  AST  78<H>  /  ALT  44<H>  /  AlkPhos  185<H>  02-16  TPro  7.1  /  Alb  2.5<L>  /  TBili  0.7  /  DBili  0.3<H>  /  AST  83<H>  /  ALT  37  /  AlkPhos  173<H>  02-15  TPro  8.6<H>  /  Alb  3.3  /  TBili  0.7  /  DBili  x   /  AST  80<H>  /  ALT  52<H>  /  AlkPhos  233<H>  02-13    CAPILLARY BLOOD GLUCOSE      POCT Blood Glucose.: 193 mg/dL (16 Feb 2018 12:20)  POCT Blood Glucose.: 97 mg/dL (16 Feb 2018 08:47)  POCT Blood Glucose.: 85 mg/dL (16 Feb 2018 04:35)  POCT Blood Glucose.: 88 mg/dL (15 Feb 2018 21:51)  POCT Blood Glucose.: 88 mg/dL (15 Feb 2018 16:44)      LIVER FUNCTIONS - ( 16 Feb 2018 07:00 )  Alb: 2.5 g/dL / Pro: 7.1 g/dL / ALK PHOS: 185 u/L / ALT: 44 u/L / AST: 78 u/L / GGT: x           PT/INR - ( 15 Feb 2018 06:00 )   PT: 30.1 SEC;   INR: 2.57              Lactate, Blood: 1.1 mmol/L (02-16 @ 07:00)  Cultures:                       Rapid Respiratory Viral Panel Result        02-13 @ 18:40  Rapid RVP --  Coronovirus --  Adenovirus NOT DETECTED  Bordetella Pertussis NOT DETECTED  Chlamydia Pneumonia NOT DETECTED  Entero/RhinovirusNOT DETECTED  HKU1 Coronovirus --  HMPV Coronovirus NOT DETECTED  Influenza A NOT DETECTED (any subtype)  Influenza AH1 NOT DETECTED  Influenza AH1 2009 NOT DETECTED  Influenza AH3 NOT DETECTED  Influenza B POSITIVE  Mycoplasma Pneumoniae NOT DETECTED This nucleic acid amplification assay was performed using  the [employer] Film[employer]. The following pathogens are tested  for: Adenovirus, Coronavirus 229E, Coronavirus HKU1,  Coronavirus NL63, Coronavirus OC43, Human Metapneumovirus  (HMPV), Rhinovirus/Enterovirus, Influenza A H1, Influenza A  H1 2009 (Pandemic H1 2009), Influenza A H3, Influenza A (Flu  A) subtype not identified, Influenza B, Parainfluenza Virus  (types 1, 2, 3, 4), Respiratory Syncytial Virus (RSV),  Bordetella pertussis, Chlamydophila pneumoniae, and  Mycoplasma pneumoniae. A negative FilmArray result does not  always exclude the possibility of viral or bacterial  infection. Laboratory results should always be interpreted  in the context of clinical findings.  NL63 Coronovirus --  OC43 Coronovirus --  Parainfluenza 1 NOT DETECTED  Parainfluenza 2 NOT DETECTED  Parainfluenza 3 NOT DETECTED  Parainfluenza 4 NOT DETECTED  Resp Syncytial Virus NOT DETECTED    < from: Xray Chest 1 View-PORTABLE IMMEDIATE (02.13.18 @ 18:58) >    INTERPRETATION:  CLINICAL INFORMATION: Shortness of breath. Evaluate for   fluid overload.    TECHNIQUE: AP view of the chest.    COMPARISON: Chest x-ray 1/27/2018.    FINDINGS:     Right IJ hemodialysis catheter terminates in the SVC. Single lead   left-sided AICD terminates in the right ventricle.  There is moderate pulmonary edema and small right pleural effusion. The   left costophrenic angle is not fully visualized.  The cardiomediastinal silhouette is enlarged.  The visualized osseous structures are unremarkable for age.    IMPRESSION:   Moderate pulmonary edema with small right pleural effusion. Left   costophrenic angle not fully visualized.              WERO RAZO M.D., RADIOLOGY RESIDENT  This document has been electronically signed.  MARINE HERNÁNDEZ M.D.; ATTENDING RADIOLOGIST  This document has been electronically signed. Feb 14 2018  8:20AM              < end of copied text >        Studies  Chest X-RAY  CT SCAN Chest   Venous Dopplers: LE:   CT Abdomen  Others

## 2018-02-16 NOTE — PROGRESS NOTE ADULT - PROBLEM SELECTOR PLAN 9
-INR Therapeutic. daily PT/INR, dose Coumadin accordingly. plan for 3mg tonight  -c/w Amiodarone 100mg daily

## 2018-02-16 NOTE — PROGRESS NOTE ADULT - PROBLEM SELECTOR PLAN 2
-COPD/Pulmonary Fibrosis exacerbated in the setting of Influenza  -wean BIPAP off for daytime, c/w nocturnal BIPAP  -c/w standing DuoNebs q4h  -appreciate Pulm recs -COPD/Pulmonary Fibrosis exacerbated in the setting of Influenza  -pt saturating well on ventimast, c/w nocturnal BIPAP  -c/w standing Karan q4h  -appreciate Pulm recs

## 2018-02-16 NOTE — PROGRESS NOTE ADULT - PROBLEM SELECTOR PLAN 4
-continue with duoneb q 4 hours standing and BiPAP for now. No signs of COPD exacerbation, no wheezing.   -tamiflu for influenza infection  -c/w symbicort,  -should patient worsen, can dose steroids and possibly azithromycin  -pt's outpt pulmonologist Dr. Coffman consulted, f/u recs -continue with duoneb q 4 hours standing and BiPAP for now. No signs of COPD exacerbation, no wheezing.   -tamiflu for influenza infection  -c/w symbicort,  -should patient worsen, can dose steroids and possibly azithromycin  -pt's outpt pulmonologist Dr. Coffman consulted, appreciate recs

## 2018-02-16 NOTE — PROGRESS NOTE ADULT - ASSESSMENT
61yo M with ESRD (M/W/F), NICM (EF 21%, s/p ICD, PICC with home dobutamine gtt), AFib (on coumadin), COPD on home O2 (4-5L), pulmonary fibrosis, hypotension (on midodrine) admitted with Acute on Chronic Respiratory Failure secondary to influenza B infection exacerbating underlying pulmonary fibrosis/COPD.

## 2018-02-16 NOTE — PROGRESS NOTE ADULT - SUBJECTIVE AND OBJECTIVE BOX
PROVIDER CONTACT INFO:  MD RONEL Storey Pager: [phone]  Long Range Pager: [phone]    KURT STRONG  62y  Male      Patient is a 62y old  Male who presents with a chief complaint of SOB x few hours (13 Feb 2018 22:11)      INTERVAL HPI/OVERNIGHT EVENTS: No overnight events.     T(C): 36.4 (02-16-18 @ 05:44), Max: 36.9 (02-15-18 @ 18:08)  HR: 78 (02-16-18 @ 07:11) (78 - 99)  BP: 75/40 (02-16-18 @ 05:44) (75/40 - 103/43)  RR: 18 (02-16-18 @ 05:44) (18 - 18)  SpO2: 97% (02-16-18 @ 07:11) (94% - 100%)  Wt(kg): --Vital Signs Last 24 Hrs  T(C): 36.4 (16 Feb 2018 05:44), Max: 36.9 (15 Feb 2018 18:08)  T(F): 97.5 (16 Feb 2018 05:44), Max: 98.5 (15 Feb 2018 18:08)  HR: 78 (16 Feb 2018 07:11) (78 - 99)  BP: 75/40 (16 Feb 2018 05:44) (75/40 - 103/43)  BP(mean): --  RR: 18 (16 Feb 2018 05:44) (18 - 18)  SpO2: 97% (16 Feb 2018 07:11) (94% - 100%)    PHYSICAL EXAM:  GENERAL: NAD, well-groomed, well-developed  HEAD:  Atraumatic, Normocephalic  EYES: EOMI, PERRLA, conjunctiva and sclera clear  ENMT: No tonsillar erythema, exudates,; Moist mucous membranes. No lesions  NECK: Supple, No JVD, Normal thyroid  CHEST/LUNG: Clear to percussion bilaterally; No rales, rhonchi, wheezing, or rubs  HEART: Regular rate and rhythm; No murmurs, rubs, or gallops  ABDOMEN: Soft, Nontender, Nondistended; Bowel sounds present.  No hepatosplenomegaly  EXTREMITIES:  2+ Peripheral Pulses, No clubbing, cyanosis, or edema  LYMPH: No lymphadenopathy noted  SKIN: No rashes or lesions  PSYCH: Alert & Oriented x3    Consultant(s) Notes Reviewed:  [x ] YES  [ ] NO  Care Discussed with Consultants/Other Providers [ x] YES  [ ] NO    LABS:                        11.1   4.76  )-----------( 91       ( 15 Feb 2018 06:00 )             36.2     02-15    136  |  97<L>  |  27<H>  ----------------------------<  69<L>  4.1   |  26  |  5.16<H>    Ca    8.1<L>      15 Feb 2018 06:00  Phos  2.9     02-15  Mg     1.9     02-15    TPro  7.1  /  Alb  2.5<L>  /  TBili  0.7  /  DBili  0.3<H>  /  AST  83<H>  /  ALT  37  /  AlkPhos  173<H>  02-15    PT/INR - ( 15 Feb 2018 06:00 )   PT: 30.1 SEC;   INR: 2.57              CAPILLARY BLOOD GLUCOSE      POCT Blood Glucose.: 85 mg/dL (16 Feb 2018 04:35)  POCT Blood Glucose.: 88 mg/dL (15 Feb 2018 21:51)  POCT Blood Glucose.: 88 mg/dL (15 Feb 2018 16:44)  POCT Blood Glucose.: 112 mg/dL (15 Feb 2018 12:11)  POCT Blood Glucose.: 67 mg/dL (15 Feb 2018 08:28) PROVIDER CONTACT INFO:  MD RONEL Storey Pager: [phone]  Long [place] Pager: [phone]    KURT STRONG  62y  Male      Patient is a 62y old  Male who presents with a chief complaint of SOB x few hours (13 Feb 2018 22:11)      INTERVAL HPI/OVERNIGHT EVENTS: Overnight pt had two episodes of non-sustained v-tach. First was 4 beats and second was 8 beats. Both times BP was wnl and pt was asymptomatic. This AM pt states he feels "pretty good," states breathing is better. Denies CP, abd pain, F/C/N/V, LE edema.     T(C): 36.4 (02-16-18 @ 05:44), Max: 36.9 (02-15-18 @ 18:08)  HR: 78 (02-16-18 @ 07:11) (78 - 99)  BP: 75/40 (02-16-18 @ 05:44) (75/40 - 103/43)  RR: 18 (02-16-18 @ 05:44) (18 - 18)  SpO2: 97% (02-16-18 @ 07:11) (94% - 100%)  Wt(kg): --Vital Signs Last 24 Hrs  T(C): 36.4 (16 Feb 2018 05:44), Max: 36.9 (15 Feb 2018 18:08)  T(F): 97.5 (16 Feb 2018 05:44), Max: 98.5 (15 Feb 2018 18:08)  HR: 78 (16 Feb 2018 07:11) (78 - 99)  BP: 75/40 (16 Feb 2018 05:44) (75/40 - 103/43)  BP(mean): --  RR: 18 (16 Feb 2018 05:44) (18 - 18)  SpO2: 97% (16 Feb 2018 07:11) (94% - 100%)    PHYSICAL EXAM:  GENERAL: NAD, well-groomed, well-developed  HEAD:  Atraumatic, Normocephalic  EYES: EOMI, PERRLA, conjunctiva and sclera clear  ENMT: No tonsillar erythema, exudates,; Moist mucous membranes. No lesions  NECK: Supple, No JVD, Normal thyroid  CHEST/LUNG: expiratory wheezes heard in upper lung fields bilaterally  HEART: Regular rate and rhythm; No murmurs, rubs, or gallops  ABDOMEN: Soft, Nontender, Nondistended; Bowel sounds present.   EXTREMITIES:  2+ Peripheral Pulses, No clubbing, cyanosis, or edema  LYMPH: No lymphadenopathy noted  SKIN: No rashes or lesions  PSYCH: Alert & Oriented x3    Consultant(s) Notes Reviewed:  [x ] YES  [ ] NO  Care Discussed with Consultants/Other Providers [ x] YES  [ ] NO    LABS:                        11.1   4.76  )-----------( 91       ( 15 Feb 2018 06:00 )             36.2     02-15    136  |  97<L>  |  27<H>  ----------------------------<  69<L>  4.1   |  26  |  5.16<H>    Ca    8.1<L>      15 Feb 2018 06:00  Phos  2.9     02-15  Mg     1.9     02-15    TPro  7.1  /  Alb  2.5<L>  /  TBili  0.7  /  DBili  0.3<H>  /  AST  83<H>  /  ALT  37  /  AlkPhos  173<H>  02-15    PT/INR - ( 15 Feb 2018 06:00 )   PT: 30.1 SEC;   INR: 2.57              CAPILLARY BLOOD GLUCOSE      POCT Blood Glucose.: 85 mg/dL (16 Feb 2018 04:35)  POCT Blood Glucose.: 88 mg/dL (15 Feb 2018 21:51)  POCT Blood Glucose.: 88 mg/dL (15 Feb 2018 16:44)  POCT Blood Glucose.: 112 mg/dL (15 Feb 2018 12:11)  POCT Blood Glucose.: 67 mg/dL (15 Feb 2018 08:28)

## 2018-02-17 DIAGNOSIS — I42.8 OTHER CARDIOMYOPATHIES: ICD-10-CM

## 2018-02-17 DIAGNOSIS — R74.8 ABNORMAL LEVELS OF OTHER SERUM ENZYMES: ICD-10-CM

## 2018-02-17 DIAGNOSIS — I95.9 HYPOTENSION, UNSPECIFIED: ICD-10-CM

## 2018-02-17 LAB
ALBUMIN SERPL ELPH-MCNC: 2.6 G/DL — LOW (ref 3.3–5)
ALP SERPL-CCNC: 184 U/L — HIGH (ref 40–120)
ALT FLD-CCNC: 50 U/L — HIGH (ref 4–41)
APTT BLD: 56.2 SEC — HIGH (ref 27.5–37.4)
AST SERPL-CCNC: 97 U/L — HIGH (ref 4–40)
BILIRUB DIRECT SERPL-MCNC: 0.3 MG/DL — HIGH (ref 0.1–0.2)
BILIRUB SERPL-MCNC: 0.7 MG/DL — SIGNIFICANT CHANGE UP (ref 0.2–1.2)
BUN SERPL-MCNC: 18 MG/DL — SIGNIFICANT CHANGE UP (ref 7–23)
CALCIUM SERPL-MCNC: 8.1 MG/DL — LOW (ref 8.4–10.5)
CHLORIDE SERPL-SCNC: 91 MMOL/L — LOW (ref 98–107)
CO2 SERPL-SCNC: 24 MMOL/L — SIGNIFICANT CHANGE UP (ref 22–31)
CREAT SERPL-MCNC: 4.54 MG/DL — HIGH (ref 0.5–1.3)
GLUCOSE SERPL-MCNC: 126 MG/DL — HIGH (ref 70–99)
HCT VFR BLD CALC: 35.5 % — LOW (ref 39–50)
HGB BLD-MCNC: 11.1 G/DL — LOW (ref 13–17)
INR BLD: 3.13 — HIGH (ref 0.88–1.17)
LACTATE SERPL-SCNC: 1.5 MMOL/L — SIGNIFICANT CHANGE UP (ref 0.5–2)
MAGNESIUM SERPL-MCNC: 1.8 MG/DL — SIGNIFICANT CHANGE UP (ref 1.6–2.6)
MCHC RBC-ENTMCNC: 29.8 PG — SIGNIFICANT CHANGE UP (ref 27–34)
MCHC RBC-ENTMCNC: 31.3 % — LOW (ref 32–36)
MCV RBC AUTO: 95.2 FL — SIGNIFICANT CHANGE UP (ref 80–100)
NRBC # FLD: 0 — SIGNIFICANT CHANGE UP
PHOSPHATE SERPL-MCNC: 2.5 MG/DL — SIGNIFICANT CHANGE UP (ref 2.5–4.5)
PLATELET # BLD AUTO: 80 K/UL — LOW (ref 150–400)
PMV BLD: 12.3 FL — SIGNIFICANT CHANGE UP (ref 7–13)
POTASSIUM SERPL-MCNC: 3.7 MMOL/L — SIGNIFICANT CHANGE UP (ref 3.5–5.3)
POTASSIUM SERPL-SCNC: 3.7 MMOL/L — SIGNIFICANT CHANGE UP (ref 3.5–5.3)
PROT SERPL-MCNC: 7.4 G/DL — SIGNIFICANT CHANGE UP (ref 6–8.3)
PROTHROM AB SERPL-ACNC: 36.9 SEC — HIGH (ref 9.8–13.1)
RBC # BLD: 3.73 M/UL — LOW (ref 4.2–5.8)
RBC # FLD: 16.4 % — HIGH (ref 10.3–14.5)
SODIUM SERPL-SCNC: 130 MMOL/L — LOW (ref 135–145)
WBC # BLD: 4.97 K/UL — SIGNIFICANT CHANGE UP (ref 3.8–10.5)
WBC # FLD AUTO: 4.97 K/UL — SIGNIFICANT CHANGE UP (ref 3.8–10.5)

## 2018-02-17 PROCEDURE — 71045 X-RAY EXAM CHEST 1 VIEW: CPT | Mod: 26

## 2018-02-17 PROCEDURE — 93010 ELECTROCARDIOGRAM REPORT: CPT

## 2018-02-17 RX ORDER — SODIUM CHLORIDE 9 MG/ML
1000 INJECTION INTRAMUSCULAR; INTRAVENOUS; SUBCUTANEOUS
Qty: 0 | Refills: 0 | Status: DISCONTINUED | OUTPATIENT
Start: 2018-02-17 | End: 2018-02-20

## 2018-02-17 RX ORDER — INSULIN LISPRO 100/ML
VIAL (ML) SUBCUTANEOUS
Qty: 0 | Refills: 0 | Status: DISCONTINUED | OUTPATIENT
Start: 2018-02-17 | End: 2018-03-23

## 2018-02-17 RX ORDER — CHLORHEXIDINE GLUCONATE 213 G/1000ML
1 SOLUTION TOPICAL DAILY
Qty: 0 | Refills: 0 | Status: DISCONTINUED | OUTPATIENT
Start: 2018-02-17 | End: 2018-03-05

## 2018-02-17 RX ORDER — SODIUM CHLORIDE 9 MG/ML
250 INJECTION INTRAMUSCULAR; INTRAVENOUS; SUBCUTANEOUS ONCE
Qty: 0 | Refills: 0 | Status: COMPLETED | OUTPATIENT
Start: 2018-02-17 | End: 2018-02-17

## 2018-02-17 RX ORDER — DOBUTAMINE HCL 250MG/20ML
10 VIAL (ML) INTRAVENOUS
Qty: 500 | Refills: 0 | Status: DISCONTINUED | OUTPATIENT
Start: 2018-02-17 | End: 2018-02-20

## 2018-02-17 RX ORDER — DEXTROSE 50 % IN WATER 50 %
1 SYRINGE (ML) INTRAVENOUS ONCE
Qty: 0 | Refills: 0 | Status: DISCONTINUED | OUTPATIENT
Start: 2018-02-17 | End: 2018-03-23

## 2018-02-17 RX ORDER — DEXTROSE 50 % IN WATER 50 %
12.5 SYRINGE (ML) INTRAVENOUS ONCE
Qty: 0 | Refills: 0 | Status: COMPLETED | OUTPATIENT
Start: 2018-02-17 | End: 2018-02-17

## 2018-02-17 RX ORDER — NOREPINEPHRINE BITARTRATE/D5W 8 MG/250ML
0.1 PLASTIC BAG, INJECTION (ML) INTRAVENOUS
Qty: 8 | Refills: 0 | Status: DISCONTINUED | OUTPATIENT
Start: 2018-02-17 | End: 2018-02-18

## 2018-02-17 RX ORDER — INSULIN LISPRO 100/ML
VIAL (ML) SUBCUTANEOUS AT BEDTIME
Qty: 0 | Refills: 0 | Status: DISCONTINUED | OUTPATIENT
Start: 2018-02-17 | End: 2018-03-23

## 2018-02-17 RX ORDER — SODIUM CHLORIDE 9 MG/ML
1000 INJECTION, SOLUTION INTRAVENOUS
Qty: 0 | Refills: 0 | Status: DISCONTINUED | OUTPATIENT
Start: 2018-02-17 | End: 2018-03-23

## 2018-02-17 RX ORDER — INFLUENZA VIRUS VACCINE 15; 15; 15; 15 UG/.5ML; UG/.5ML; UG/.5ML; UG/.5ML
0.5 SUSPENSION INTRAMUSCULAR ONCE
Qty: 0 | Refills: 0 | Status: DISCONTINUED | OUTPATIENT
Start: 2018-02-17 | End: 2018-03-23

## 2018-02-17 RX ADMIN — SODIUM CHLORIDE 250 MILLILITER(S): 9 INJECTION INTRAMUSCULAR; INTRAVENOUS; SUBCUTANEOUS at 11:32

## 2018-02-17 RX ADMIN — Medication 22.5 MICROGRAM(S)/KG/MIN: at 16:20

## 2018-02-17 RX ADMIN — MIDODRINE HYDROCHLORIDE 10 MILLIGRAM(S): 2.5 TABLET ORAL at 13:08

## 2018-02-17 RX ADMIN — Medication 3 MILLILITER(S): at 08:41

## 2018-02-17 RX ADMIN — SODIUM CHLORIDE 100 MILLILITER(S): 9 INJECTION INTRAMUSCULAR; INTRAVENOUS; SUBCUTANEOUS at 15:30

## 2018-02-17 RX ADMIN — MIDODRINE HYDROCHLORIDE 10 MILLIGRAM(S): 2.5 TABLET ORAL at 00:06

## 2018-02-17 RX ADMIN — Medication 22.5 MICROGRAM(S)/KG/MIN: at 19:00

## 2018-02-17 RX ADMIN — Medication 3 MILLILITER(S): at 04:04

## 2018-02-17 RX ADMIN — Medication 3 MILLILITER(S): at 17:08

## 2018-02-17 RX ADMIN — MIDODRINE HYDROCHLORIDE 10 MILLIGRAM(S): 2.5 TABLET ORAL at 22:19

## 2018-02-17 RX ADMIN — Medication 14.06 MICROGRAM(S)/KG/MIN: at 22:31

## 2018-02-17 RX ADMIN — Medication 75 MICROGRAM(S): at 05:54

## 2018-02-17 RX ADMIN — ATORVASTATIN CALCIUM 80 MILLIGRAM(S): 80 TABLET, FILM COATED ORAL at 22:19

## 2018-02-17 RX ADMIN — AMIODARONE HYDROCHLORIDE 100 MILLIGRAM(S): 400 TABLET ORAL at 05:54

## 2018-02-17 RX ADMIN — Medication 3 MILLILITER(S): at 00:28

## 2018-02-17 RX ADMIN — SODIUM CHLORIDE 100 MILLILITER(S): 9 INJECTION INTRAMUSCULAR; INTRAVENOUS; SUBCUTANEOUS at 19:21

## 2018-02-17 RX ADMIN — BUDESONIDE AND FORMOTEROL FUMARATE DIHYDRATE 2 PUFF(S): 160; 4.5 AEROSOL RESPIRATORY (INHALATION) at 09:38

## 2018-02-17 RX ADMIN — ATORVASTATIN CALCIUM 80 MILLIGRAM(S): 80 TABLET, FILM COATED ORAL at 00:06

## 2018-02-17 RX ADMIN — Medication 3 MILLILITER(S): at 20:44

## 2018-02-17 RX ADMIN — Medication 81 MILLIGRAM(S): at 11:41

## 2018-02-17 RX ADMIN — SENNA PLUS 2 TABLET(S): 8.6 TABLET ORAL at 22:19

## 2018-02-17 RX ADMIN — Medication 3 MILLILITER(S): at 13:45

## 2018-02-17 RX ADMIN — Medication 1 TABLET(S): at 11:41

## 2018-02-17 RX ADMIN — Medication 22.5 MICROGRAM(S)/KG/MIN: at 17:12

## 2018-02-17 RX ADMIN — BUDESONIDE AND FORMOTEROL FUMARATE DIHYDRATE 2 PUFF(S): 160; 4.5 AEROSOL RESPIRATORY (INHALATION) at 20:47

## 2018-02-17 RX ADMIN — Medication 12.5 GRAM(S): at 04:32

## 2018-02-17 RX ADMIN — MIDODRINE HYDROCHLORIDE 10 MILLIGRAM(S): 2.5 TABLET ORAL at 05:54

## 2018-02-17 RX ADMIN — FINASTERIDE 5 MILLIGRAM(S): 5 TABLET, FILM COATED ORAL at 11:41

## 2018-02-17 NOTE — PROGRESS NOTE ADULT - ASSESSMENT
62y Male with ESRD on HD MWF, NICM with severe LV dysfnxn (EF 21%), s/p biotronic ICD, on home dobutamine drip, Afib on coumadin, COPD, pulm fibrosis on 3-4L home O2, DM, and chronic hypotension on midodrine admitted with SOB 2/2 pulm edema and Flu.     labs, chart reviewed

## 2018-02-17 NOTE — PROGRESS NOTE ADULT - PROBLEM SELECTOR PLAN 3
-in the setting of decreased PO intake while on BIPAP, has required multiple hypoglycemia interventions  -now on ventimask and taking PO regularly  - FS q6h, add ISS if blood sugars elevated

## 2018-02-17 NOTE — PROVIDER CONTACT NOTE (OTHER) - ASSESSMENT
pt on bipap overnight and venturi mask 50% during the day. pt had 2 oral PO treatments. currently refusing to drink/ eat to increase his blood sugar

## 2018-02-17 NOTE — PROGRESS NOTE ADULT - SUBJECTIVE AND OBJECTIVE BOX
Patient is a 62y old  Male who presents with a chief complaint of SOB x few hours (13 Feb 2018 22:11)    Any change in ROS: on facial mask. cough (+), sob (+)    MEDICATIONS  (STANDING):  ALBUTerol/ipratropium for Nebulization 3 milliLiter(s) Nebulizer every 4 hours  amiodarone    Tablet 100 milliGRAM(s) Oral daily  aspirin enteric coated 81 milliGRAM(s) Oral daily  atorvastatin 80 milliGRAM(s) Oral at bedtime  buDESOnide 160 MICROgram(s)/formoterol 4.5 MICROgram(s) Inhaler 2 Puff(s) Inhalation two times a day  dextrose 5%. 1000 milliLiter(s) (50 mL/Hr) IV Continuous <Continuous>  dextrose 50% Injectable 12.5 Gram(s) IV Push once  dextrose 50% Injectable 25 Gram(s) IV Push once  dextrose 50% Injectable 25 Gram(s) IV Push once  DOBUTamine Infusion 10 MICROgram(s)/kG/Min (22.5 mL/Hr) IV Continuous <Continuous>  docusate sodium 100 milliGRAM(s) Oral two times a day  finasteride 5 milliGRAM(s) Oral daily  levothyroxine 75 MICROGram(s) Oral daily  midodrine 10 milliGRAM(s) Oral three times a day  Nephro-lynda 1 Tablet(s) Oral daily  polyethylene glycol 3350 17 Gram(s) Oral daily  senna 2 Tablet(s) Oral at bedtime    MEDICATIONS  (PRN):  dextrose Gel 1 Dose(s) Oral once PRN Blood Glucose LESS THAN 70 milliGRAM(s)/deciLiter  glucagon  Injectable 1 milliGRAM(s) IntraMuscular once PRN Glucose <70 milliGRAM(s)/deciLiter    Vital Signs Last 24 Hrs  T(C): 36.5 (17 Feb 2018 10:30), Max: 36.9 (17 Feb 2018 03:00)  T(F): 97.7 (17 Feb 2018 10:30), Max: 98.4 (17 Feb 2018 03:00)  HR: 86 (17 Feb 2018 10:30) (83 - 103)  BP: 62/37 (17 Feb 2018 10:30) (62/37 - 97/48)  BP(mean): --  RR: 20 (17 Feb 2018 10:30) (18 - 20)  SpO2: 98% (17 Feb 2018 10:30) (90% - 100%)    I&O's Summary    16 Feb 2018 07:01  -  17 Feb 2018 07:00  --------------------------------------------------------  IN: 400 mL / OUT: 2000 mL / NET: -1600 mL          Physical Exam:   General: NAD, well developed, well nourished.   Eyes: PERRL, EOM intact; conjunctiva and sclera clear  Head: Normocephalic, atraumatic  ENMT: No nasal discharge, airway clear  Neck: Supple, non tender,  no masses, no JVD  Respiratory: wheezing (+), crackles (+)  Cardiovascular: Regular rate and rhythm, no murmurs.  Gastrointestinal: Soft non-tender non-distended, positive bowel sounds  Extremities: Normal range of motion, no clubbing, cyanosis or edema  Vascular: Peripheral pulses palpable 2+ bilaterally  Neurological: Alert and oriented x3, follows commands, answers questions appropriately.   Skin: Warm and dry. No obvious rash  Lymph Nodes: No acute cervical or supraclavicular adenopathy  Musculoskeletal: kyphosis ( -), Move all extremities,   Psychiatric: Cooperative and appropriate mood    Labs:  26                            11.1   4.97  )-----------( 80       ( 17 Feb 2018 05:20 )             35.5                         10.8   3.60  )-----------( 89       ( 16 Feb 2018 07:00 )             34.8                         11.1   4.76  )-----------( 91       ( 15 Feb 2018 06:00 )             36.2                         11.2   5.07  )-----------( 97       ( 14 Feb 2018 04:40 )             35.7                         12.7   6.42  )-----------( 110      ( 13 Feb 2018 18:40 )             40.8     02-17    130<L>  |  91<L>  |  18  ----------------------------<  126<H>  3.7   |  24  |  4.54<H>  02-16    131<L>  |  92<L>  |  37<H>  ----------------------------<  116<H>  3.9   |  24  |  6.56<H>  02-15    136  |  97<L>  |  27<H>  ----------------------------<  69<L>  4.1   |  26  |  5.16<H>  02-14    137  |  95<L>  |  41<H>  ----------------------------<  88  4.2   |  26  |  6.37<H>  02-13    137  |  95<L>  |  35<H>  ----------------------------<  141<H>  4.9   |  27  |  5.96<H>    Ca    8.1<L>      17 Feb 2018 05:20  Ca    8.0<L>      16 Feb 2018 07:00  Phos  2.5     02-17  Phos  3.8     02-16  Mg     1.8     02-17  Mg     2.0     02-16    TPro  7.4  /  Alb  2.6<L>  /  TBili  0.7  /  DBili  0.3<H>  /  AST  97<H>  /  ALT  50<H>  /  AlkPhos  184<H>  02-17  TPro  7.1  /  Alb  2.5<L>  /  TBili  0.6  /  DBili  x   /  AST  78<H>  /  ALT  44<H>  /  AlkPhos  185<H>  02-16  TPro  7.1  /  Alb  2.5<L>  /  TBili  0.7  /  DBili  0.3<H>  /  AST  83<H>  /  ALT  37  /  AlkPhos  173<H>  02-15  TPro  8.6<H>  /  Alb  3.3  /  TBili  0.7  /  DBili  x   /  AST  80<H>  /  ALT  52<H>  /  AlkPhos  233<H>  02-13    CAPILLARY BLOOD GLUCOSE      POCT Blood Glucose.: 133 mg/dL (17 Feb 2018 10:51)  POCT Blood Glucose.: 140 mg/dL (17 Feb 2018 04:58)  POCT Blood Glucose.: 86 mg/dL (17 Feb 2018 04:14)  POCT Blood Glucose.: 69 mg/dL (17 Feb 2018 03:43)  POCT Blood Glucose.: 83 mg/dL (16 Feb 2018 22:39)  POCT Blood Glucose.: 130 mg/dL (16 Feb 2018 16:53)  POCT Blood Glucose.: 193 mg/dL (16 Feb 2018 12:20)      LIVER FUNCTIONS - ( 17 Feb 2018 05:20 )  Alb: 2.6 g/dL / Pro: 7.4 g/dL / ALK PHOS: 184 u/L / ALT: 50 u/L / AST: 97 u/L / GGT: x           PT/INR - ( 17 Feb 2018 05:20 )   PT: 36.9 SEC;   INR: 3.13          PTT - ( 17 Feb 2018 05:20 )  PTT:56.2 SEC    Lactate, Blood: 1.5 mmol/L (02-17 @ 11:10)  Lactate, Blood: 1.1 mmol/L (02-16 @ 07:00)  Cultures:       Rapid Respiratory Viral Panel Result        02-13 @ 18:40  Rapid RVP --  Coronovirus --  Adenovirus NOT DETECTED  Bordetella Pertussis NOT DETECTED  Chlamydia Pneumonia NOT DETECTED  Entero/RhinovirusNOT DETECTED  HKU1 Coronovirus --  HMPV Coronovirus NOT DETECTED  Influenza A NOT DETECTED (any subtype)  Influenza AH1 NOT DETECTED  Influenza AH1 2009 NOT DETECTED  Influenza AH3 NOT DETECTED  Influenza B POSITIVE  Mycoplasma Pneumoniae NOT DETECTED This nucleic acid amplification assay was performed using  the [employer]. The following pathogens are tested  for: Adenovirus, Coronavirus 229E, Coronavirus HKU1,  Coronavirus NL63, Coronavirus OC43, Human Metapneumovirus  (HMPV), Rhinovirus/Enterovirus, Influenza A H1, Influenza A  H1 2009 (Pandemic H1 2009), Influenza A H3, Influenza A (Flu  A) subtype not identified, Influenza B, Parainfluenza Virus  (types 1, 2, 3, 4), Respiratory Syncytial Virus (RSV),  Bordetella pertussis, Chlamydophila pneumoniae, and  Mycoplasma pneumoniae. A negative [employer] result does not  always exclude the possibility of viral or bacterial  infection. Laboratory results should always be interpreted  in the context of clinical findings.  NL63 Coronovirus --  OC43 Coronovirus --  Parainfluenza 1 NOT DETECTED  Parainfluenza 2 NOT DETECTED  Parainfluenza 3 NOT DETECTED  Parainfluenza 4 NOT DETECTED  Resp Syncytial Virus NOT DETECTED      Studies  Chest X-RAY < from: Xray Chest 1 View-PORTABLE IMMEDIATE (02.13.18 @ 18:58) >    EXAM:  XR CHEST PORTABLE IMMED 1V        PROCEDURE DATE:  Feb 13 2018         INTERPRETATION:  CLINICAL INFORMATION: Shortness of breath. Evaluate for   fluid overload.    TECHNIQUE: AP view of the chest.    COMPARISON: Chest x-ray 1/27/2018.    FINDINGS:     Right IJ hemodialysis catheter terminates in the SVC. Single lead   left-sided AICD terminates in the right ventricle.  There is moderate pulmonary edema and small right pleural effusion. The   left costophrenic angle is not fully visualized.  The cardiomediastinal silhouette is enlarged.  The visualized osseous structures are unremarkable for age.    IMPRESSION:   Moderate pulmonary edema with small right pleural effusion. Left   costophrenic angle not fully visualized.    < end of copied text >    CT SCAN Chest < from: CT Chest No Cont (06.05.17 @ 15:45) >    EXAM:  CT CHEST        PROCEDURE DATE:  Jun 5 2017         INTERPRETATION:  CLINICAL INFORMATION: End-stage renal disease and   congestive heart failure. Rule out pneumonia.    PROCEDURE:   CT of the Chest was performed without intravenous contrast.   Intravenous contrast: None.    Reconstructions were performed in axial thin, axial maximum intensity   projection, sagittal and coronal planes.    COMPARISON: Chest x-ray 6/2/2017.    FINDINGS:    A right IJ hemodialysis catheter terminates at the SVC/right atrial   junction.    LUNGS AND LARGE AIRWAYS: Small amount of secretions in the trachea.   Diffuse bilateral groundglass opacities with traction bronchiectasis   throughout the lungs with regions of polygonal sparing bilateral lower   andleft upper lobes. There is honeycombing scattered throughout the   lungs. Interlobular septal thickening is noted in the bilateral upper   lobes. Patchy tree-in-bud opacities are noted in the bilateral lower   lobes. Scattered small calcified granulomas.  PLEURA: Trace bilateral pleural effusions.  VESSELS: Atherosclerotic calcifications of the aorta and coronary   arteries.  HEART: Cardiomegaly. No pericardial effusion.  MEDIASTINUM AND ASH: No lymphadenopathy.  CHEST WALL AND LOWER NECK: Left chest wall AICD. Generalized anasarca.  UPPER ABDOMEN: Hyperdense material within the gallbladder may reflect   vicarious excretion of iodinated contrast from recent procedure.  BONES: Degenerative changes of the spine.    IMPRESSION:    Extensive pulmonary fibrosis.    Evidence of superimposed mild small airway disease.    < end of copied text >    Venous Dopplers: LE:   CT Abdomen  Others  < from: Transthoracic Echocardiogram (10.18.17 @ 09:48) >    Patient name: Patrice Plascencia  YOB: 1955   Age: 62 (M)   MR#: [id number]  Study Date: 10/18/2017  Location: D5KG- Bubble StSonographer: Mery Garcia  Study quality: Technically good  Referring Physician: Channing Elena MD  Blood Pressure: 81/46 mmHg  Height: 180 cm  Weight: 79 kg  BSA: 2 m2  ------------------------------------------------------------------------  PROCEDURE: Transthoracic echocardiogram with 2-D, M-Mode  and complete spectral and color flow Doppler.  Patient was injected with 10 cc's of aerosolized saline.  INDICATION: Unspecified atrial fibrillation (I48.91),  Unspecified atrial flutter (I48.92)  ------------------------------------------------------------------------  DIMENSIONS:  Dimensions:     Normal Values:  LA:     5.4 cm    2.0 - 4.0 cm  Ao:     2.4 cm    2.0 - 3.8 cm  SEPTUM: 0.7 cm    0.6 - 1.2 cm  PWT:    1.2 cm    0.6 - 1.1 cm  LVIDd:  6.2 cm    3.0 - 5.6 cm  LVIDs:  5.6 cm    1.8 - 4.0 cm  Derived Variables:  LVMI: 123 g/m2  RWT: 0.38  Fractional short: 10 %  Ejection Fraction (Teicholtz): 21 %  ------------------------------------------------------------------------  OBSERVATIONS:  Mitral Valve: Normal mitral valve. Mild mitral  regurgitation.  Aortic Root: Normal aortic root.  Aortic Valve: Normal trileaflet aortic valve.  Left Atrium: Severely dilated left atrium.  LA volume index  = 56 cc/m2.  Left Ventricle: Severe global left ventricular systolic  dysfunction. Flattening of the interventricular septum in  both systole and diastole is  consistent with right  ventricular pressure overload. Moderate left ventricular  enlargement.  Right Heart: Moderate right atrial enlargement. Right  ventricular enlargement with decreased right ventricular  systolic function. A device wire is noted in the right  heart. Normal tricuspid valve.  Moderate tricuspid  regurgitation. Normal pulmonic valve. Mild pulmonic  regurgitation.  Pericardium/PleuraNormal pericardium with trace pericardial  effusion.  Hemodynamic: Estimated right ventricular systolic pressure  equals 55 mm Hg, assuming right atrial pressure equals 10  mm Hg, consistent with moderate pulmonary hypertension.  Agitated saline injection resulted in a large amount of  bubbles seen in the right heart. Although the exact amount  of cardiac cycles that occurred prior to the crossing of  the bubbles is unclear, the findings are suggestive of a  significant shunt. Consider MARGIE for further workup, if  clinically warrnated.  ------------------------------------------------------------------------  CONCLUSIONS:  1. Normal trileaflet aortic valve.  2. Severely dilated left atrium.  LA volume index = 56  cc/m2.  3. Moderate left ventricular enlargement.  4. Severe global left ventricular systolic dysfunction.  Flattening of the interventricular septum in both systole  and diastole is  consistent with right ventricular pressure  overload.  5. Right ventricular enlargement with decreased right  ventricular systolic function. A device wire is noted in  the right heart.  6. Normal tricuspid valve.  Moderate tricuspid  regurgitation.  7. Normal pulmonic valve. Mild pulmonic regurgitation.  Estimated pulmonary artery systolic pressure equals 55 mm  Hg, assuming right atrial pressure equals 10  mm Hg,  consistent with moderate pulmonary hypertension.  8. Agitated saline injection resulted in a large amount of  bubbles seen in the right heart. Although the exact amount  of cardiac cycles that occurred prior to the crossing of  the bubbles is unclear, the findings are suggestive of a  significant shunt. Consider MARGIE for further workup, if  clinically warrnated.  ------------------------------------------------------------------------  Confirmed on  10/18/2017 - 17:36:22 by Maureen Nixon M.D. [facility]    < end of copied text >

## 2018-02-17 NOTE — PROGRESS NOTE ADULT - PROBLEM SELECTOR PLAN 2
Chronic hypotension, less than baseline values.  Continue midodrine 10mg tid  rest of Mx per CCU  Low BP limiting target UF goal.

## 2018-02-17 NOTE — PROGRESS NOTE ADULT - PROBLEM SELECTOR PLAN 5
-patient on amiodarone with severe pulmonary fibrosis and very little reserve  -previous notes document that if increased FiO2 requirements, dc amiodarone  -patient with influenza b infection at this time which is likely the cause of his respiratory distress  -will c/w amiodarone for now appreciate recommendations from pt's outpt cardiologist, Dr. Davis.

## 2018-02-17 NOTE — PROGRESS NOTE ADULT - PROBLEM SELECTOR PLAN 2
-COPD/Pulmonary Fibrosis exacerbated in the setting of Influenza  -pt saturating well on ventimast, c/w nocturnal BIPAP, will try high flow in attempt to wean off BiPAP  -c/w standing DuoNebs q4h  -appreciate Pulm recs

## 2018-02-17 NOTE — PROGRESS NOTE ADULT - PROBLEM SELECTOR PLAN 4
-continue with duoneb q 4 hours standing and BiPAP for now. No signs of COPD exacerbation, no wheezing.   -c/w symbicort,  -should patient worsen, can dose steroids and possibly azithromycin  -pt's outpt pulmonologist Dr. Coffman consulted, appreciate recs

## 2018-02-17 NOTE — PROGRESS NOTE ADULT - PROBLEM SELECTOR PLAN 1
Maintenance HD schedule MWF.  s/p HD yesterday. Rx sheet reviewed, net UF 1.6kg, noted intradialytic hypotension  Electrolytes acceptable. chronic volume overload 2/2 sev CMP  Continue midodrine 10mg tid  unstable at this time for hd/PUF (though no indication for it )  will reevalute tomorrow for hd need  c/w renal diet, fluid restrictions

## 2018-02-17 NOTE — PROGRESS NOTE ADULT - PROBLEM SELECTOR PLAN 1
Profound drop in BP is likely due to volume depletion in the setting of Influenza.  Given his profoundly poor LV function will give NS @ 100 cc/hr X 4 hours and then stop.  Hold on Levophed for now.

## 2018-02-17 NOTE — CHART NOTE - NSCHARTNOTEFT_GEN_A_CORE
PGY1 TRANSFER NOTE    CCU Transfer Note    Transfer from: Medicine    Transfer to: CCU    HOSPITAL COURSE:    Patient is a 62 year old man with ESRD (HD M/W/F), NICM (EF 21%) s/p ICD, PICC line in place with home dobutamine drip, atrial fibrillation on coumadin, COPD on home O2 (4-5L), pulmonary fibrosis, hypotension on midodrine with recent admission 1/23-1/31 for diarrhea found to be positive for human meta pneumovirus who now returned to [facility] ER complaining of shortness of breath that started earlier on evening of presenation. He described the sensation as his "oxygen not being enough him." He denied sick contacts or travel since last admission. He denied fever, chills, cough, chest pain, wheezing, abdominal pain, vomiting. He endorsed LE edema worse than usual as well as one episode of non-bloody diarrhea. Patient stated he tried his inhaler with no relief of SOB. He completed HD the day prior to admission and had removal of 3.5L. He denied dietary indiscretion. Patient placed on BiPAP in ED with symptomatic improvement. Pt not make urine.     In ER: T 98.4, HR 83, BP 91/52, RR 14, SpO2 98% on 40% FiO2 on BiPAP 12/5. Patient received dobutamine gtts at home dose in ER. RVP+ for influenza B infection. Upon admission, pt was continued on tamiflu and continued on BiPAP. His outpatient pulmonologist and cardiologist were both consulted. His home amiodarone and dobutamine were continued. He experienced an improvement in his respiratory status and was able to be weaned to ventimask two days after admission. He continued with BiPAP at night. His blood pressures remained soft throughout hospitalization, usually around 70s/40s. Three days after admission tamiflu had to be discontinued due to tremulousness on exam. Hospital course was complicated by numerous episodes of hypoglycemia 2/2 inability to take PO while on supplemental oxygen. His INR was supratherapeutic on 2/16 so coumadin held. Attempts to be weaned off BiPAP at night were unsuccessful. Four days after admission patient's blood pressure was in 60s systolic so a CCU c/s was placed for further pressor support. Pt was transferred to CCU for further ionotropic support.       Vital Signs Last 24 Hrs  T(C): 36.5 (17 Feb 2018 10:30), Max: 36.9 (17 Feb 2018 03:00)  T(F): 97.7 (17 Feb 2018 10:30), Max: 98.4 (17 Feb 2018 03:00)  HR: 86 (17 Feb 2018 10:30) (83 - 103)  BP: 62/37 (17 Feb 2018 10:30) (62/37 - 97/48)  BP(mean): --  RR: 20 (17 Feb 2018 10:30) (18 - 20)  SpO2: 98% (17 Feb 2018 10:30) (90% - 100%)  I&O's Summary    16 Feb 2018 07:01  -  17 Feb 2018 07:00  --------------------------------------------------------  IN: 400 mL / OUT: 2000 mL / NET: -1600 mL      MEDICATIONS  (STANDING):  ALBUTerol/ipratropium for Nebulization 3 milliLiter(s) Nebulizer every 4 hours  amiodarone    Tablet 100 milliGRAM(s) Oral daily  aspirin enteric coated 81 milliGRAM(s) Oral daily  atorvastatin 80 milliGRAM(s) Oral at bedtime  buDESOnide 160 MICROgram(s)/formoterol 4.5 MICROgram(s) Inhaler 2 Puff(s) Inhalation two times a day  dextrose 5%. 1000 milliLiter(s) (50 mL/Hr) IV Continuous <Continuous>  dextrose 50% Injectable 12.5 Gram(s) IV Push once  dextrose 50% Injectable 25 Gram(s) IV Push once  dextrose 50% Injectable 25 Gram(s) IV Push once  DOBUTamine Infusion 10 MICROgram(s)/kG/Min (22.5 mL/Hr) IV Continuous <Continuous>  docusate sodium 100 milliGRAM(s) Oral two times a day  finasteride 5 milliGRAM(s) Oral daily  levothyroxine 75 MICROGram(s) Oral daily  midodrine 10 milliGRAM(s) Oral three times a day  Nephro-lynda 1 Tablet(s) Oral daily  polyethylene glycol 3350 17 Gram(s) Oral daily  senna 2 Tablet(s) Oral at bedtime  sodium chloride 0.9% Bolus 250 milliLiter(s) IV Bolus once    MEDICATIONS  (PRN):  dextrose Gel 1 Dose(s) Oral once PRN Blood Glucose LESS THAN 70 milliGRAM(s)/deciLiter  glucagon  Injectable 1 milliGRAM(s) IntraMuscular once PRN Glucose <70 milliGRAM(s)/deciLiter                            11.1   4.97  )-----------( 80       ( 17 Feb 2018 05:20 )             35.5     02-17    130<L>  |  91<L>  |  18  ----------------------------<  126<H>  3.7   |  24  |  4.54<H>    Ca    8.1<L>      17 Feb 2018 05:20  Phos  2.5     02-17  Mg     1.8     02-17    TPro  7.1  /  Alb  2.5<L>  /  TBili  0.6  /  DBili  x   /  AST  78<H>  /  ALT  44<H>  /  AlkPhos  185<H>  02-16    PT/INR - ( 17 Feb 2018 05:20 )   PT: 36.9 SEC;   INR: 3.13          PTT - ( 17 Feb 2018 05:20 )  PTT:56.2 SEC        FOR FOLLOW UP:    [ ] F/U Lactate level  [ ] Wean off BiPAP with goal of saturating well on home 4-5L on NC  [ ] F/U CXR  [ ] Pressor support with goal MAP > 65  [ ] F/U PT/INR and dose coumadin    Parul Marcos MD  Pager [phone] [facility] [phone]

## 2018-02-17 NOTE — PROGRESS NOTE ADULT - SUBJECTIVE AND OBJECTIVE BOX
PROVIDER CONTACT INFO:  MD RONEL Storey Pager: [phone]  Long Range Pager: [phone]    KURT STRONG  62y  Male      Patient is a 62y old  Male who presents with a chief complaint of SOB x few hours (13 Feb 2018 22:11)      INTERVAL HPI/OVERNIGHT EVENTS: Overnight pt give 1/2 amp of D50 for low blood sugar. This     T(C): 36.4 (02-17-18 @ 05:47), Max: 36.9 (02-17-18 @ 03:00)  HR: 91 (02-17-18 @ 05:47) (83 - 103)  BP: 72/42 (02-17-18 @ 05:47) (72/42 - 97/48)  RR: 18 (02-17-18 @ 05:47) (18 - 20)  SpO2: 99% (02-17-18 @ 05:47) (90% - 100%)  Wt(kg): --Vital Signs Last 24 Hrs  T(C): 36.4 (17 Feb 2018 05:47), Max: 36.9 (17 Feb 2018 03:00)  T(F): 97.6 (17 Feb 2018 05:47), Max: 98.4 (17 Feb 2018 03:00)  HR: 91 (17 Feb 2018 05:47) (83 - 103)  BP: 72/42 (17 Feb 2018 05:47) (72/42 - 97/48)  BP(mean): --  RR: 18 (17 Feb 2018 05:47) (18 - 20)  SpO2: 99% (17 Feb 2018 05:47) (90% - 100%)    PHYSICAL EXAM:  GENERAL: NAD  HEAD:  Atraumatic, Normocephalic  EYES: EOMI, PERRLA, conjunctiva and sclera clear  ENMT: No tonsillar erythema, exudates,; Moist mucous membranes. No lesions  NECK: Supple, No JVD, Normal thyroid  CHEST/LUNG: Clear to percussion bilaterally; No rales, rhonchi, wheezing, or rubs  HEART: Regular rate and rhythm; No murmurs, rubs, or gallops  ABDOMEN: Soft, Nontender, Nondistended; Bowel sounds present.  No hepatosplenomegaly  EXTREMITIES:  2+ Peripheral Pulses, No clubbing, cyanosis, or edema  LYMPH: No lymphadenopathy noted  SKIN: No rashes or lesions  PSYCH: Alert & Oriented x3    Consultant(s) Notes Reviewed:  [x ] YES  [ ] NO  Care Discussed with Consultants/Other Providers [ x] YES  [ ] NO    LABS:                        10.8   3.60  )-----------( 89       ( 16 Feb 2018 07:00 )             34.8     02-16    131<L>  |  92<L>  |  37<H>  ----------------------------<  116<H>  3.9   |  24  |  6.56<H>    Ca    8.0<L>      16 Feb 2018 07:00  Phos  3.8     02-16  Mg     2.0     02-16    TPro  7.1  /  Alb  2.5<L>  /  TBili  0.6  /  DBili  x   /  AST  78<H>  /  ALT  44<H>  /  AlkPhos  185<H>  02-16    PT/INR - ( 16 Feb 2018 18:16 )   PT: 44.8 SEC;   INR: 3.92          PTT - ( 16 Feb 2018 18:16 )  PTT:55.7 SEC    CAPILLARY BLOOD GLUCOSE      POCT Blood Glucose.: 140 mg/dL (17 Feb 2018 04:58)  POCT Blood Glucose.: 86 mg/dL (17 Feb 2018 04:14)  POCT Blood Glucose.: 69 mg/dL (17 Feb 2018 03:43)  POCT Blood Glucose.: 83 mg/dL (16 Feb 2018 22:39)  POCT Blood Glucose.: 130 mg/dL (16 Feb 2018 16:53)  POCT Blood Glucose.: 193 mg/dL (16 Feb 2018 12:20)  POCT Blood Glucose.: 97 mg/dL (16 Feb 2018 08:47) PROVIDER CONTACT INFO:  MD RONEL Storey Pager: [phone]  Long Range Pager: [phone]    KURT STRONG  62y  Male      Patient is a 62y old  Male who presents with a chief complaint of SOB x few hours (13 Feb 2018 22:11)      INTERVAL HPI/OVERNIGHT EVENTS: Overnight pt give 1/2 amp of D50 for low blood sugar. This AM pt states he feels "not bad," but remains short of breath. Denies wheezing, chest pain, dysuria, tremulousness    T(C): 36.4 (02-17-18 @ 05:47), Max: 36.9 (02-17-18 @ 03:00)  HR: 91 (02-17-18 @ 05:47) (83 - 103)  BP: 72/42 (02-17-18 @ 05:47) (72/42 - 97/48)  RR: 18 (02-17-18 @ 05:47) (18 - 20)  SpO2: 99% (02-17-18 @ 05:47) (90% - 100%)  Wt(kg): --Vital Signs Last 24 Hrs  T(C): 36.4 (17 Feb 2018 05:47), Max: 36.9 (17 Feb 2018 03:00)  T(F): 97.6 (17 Feb 2018 05:47), Max: 98.4 (17 Feb 2018 03:00)  HR: 91 (17 Feb 2018 05:47) (83 - 103)  BP: 72/42 (17 Feb 2018 05:47) (72/42 - 97/48)  BP(mean): --  RR: 18 (17 Feb 2018 05:47) (18 - 20)  SpO2: 99% (17 Feb 2018 05:47) (90% - 100%)    PHYSICAL EXAM:  GENERAL: NAD  HEAD:  Atraumatic, Normocephalic  EYES: EOMI, PERRLA, conjunctiva and sclera clear  ENMT: No tonsillar erythema, exudates,; Moist mucous membranes. No lesions  NECK: Supple, No JVD, Normal thyroid  CHEST/LUNG: Coarse breath sounds heard in all lung fields  HEART: Regular rate and rhythm; No murmurs, rubs, or gallops  ABDOMEN: Soft, Nontender, Nondistended; Bowel sounds present.   EXTREMITIES:  2+ Peripheral Pulses, No clubbing, cyanosis, or edema  LYMPH: No lymphadenopathy noted  SKIN: No rashes or lesions  PSYCH: Alert & Oriented x3    Consultant(s) Notes Reviewed:  [x ] YES  [ ] NO  Care Discussed with Consultants/Other Providers [ x] YES  [ ] NO    LABS:                        10.8   3.60  )-----------( 89       ( 16 Feb 2018 07:00 )             34.8     02-16    131<L>  |  92<L>  |  37<H>  ----------------------------<  116<H>  3.9   |  24  |  6.56<H>    Ca    8.0<L>      16 Feb 2018 07:00  Phos  3.8     02-16  Mg     2.0     02-16    TPro  7.1  /  Alb  2.5<L>  /  TBili  0.6  /  DBili  x   /  AST  78<H>  /  ALT  44<H>  /  AlkPhos  185<H>  02-16    PT/INR - ( 16 Feb 2018 18:16 )   PT: 44.8 SEC;   INR: 3.92          PTT - ( 16 Feb 2018 18:16 )  PTT:55.7 SEC    CAPILLARY BLOOD GLUCOSE      POCT Blood Glucose.: 140 mg/dL (17 Feb 2018 04:58)  POCT Blood Glucose.: 86 mg/dL (17 Feb 2018 04:14)  POCT Blood Glucose.: 69 mg/dL (17 Feb 2018 03:43)  POCT Blood Glucose.: 83 mg/dL (16 Feb 2018 22:39)  POCT Blood Glucose.: 130 mg/dL (16 Feb 2018 16:53)  POCT Blood Glucose.: 193 mg/dL (16 Feb 2018 12:20)  POCT Blood Glucose.: 97 mg/dL (16 Feb 2018 08:47)

## 2018-02-17 NOTE — PROVIDER CONTACT NOTE (OTHER) - ACTION/TREATMENT ORDERED:
2 Apple juices administered. Blood sugar 89 upon repeat. Patient refusing further PO intervention. 12.5 grams D50% injectable administered. Blood sugar upon repeat. Will continue to monitor patient. 2 Apple juices administered. Blood sugar 86 upon repeat. Patient refusing further PO intervention. 12.5 grams D50% injectable administered. Blood sugar upon repeat. Will continue to monitor patient. 2 Apple juices administered. Blood sugar 86 upon repeat. Pt refusing further PO intervention. 12.5 grams D50% injectable administered. Blood sugar 140 upon repeat. Will continue to monitor patient

## 2018-02-17 NOTE — PROGRESS NOTE ADULT - SUBJECTIVE AND OBJECTIVE BOX
Pt seen and examined at bedside    Pt c/o feeling weak. reports dyspnea stable, cough persists.   BP dropped to 62/37 earlier today, was given NS 250ml over 1hr, bp now 79/52  remains on Dobutamine drip  awaiting xfer to CCU per RN    Allergies:  No Known Allergies    Hospital Medications:   MEDICATIONS  (STANDING):  ALBUTerol/ipratropium for Nebulization 3 milliLiter(s) Nebulizer every 4 hours  amiodarone    Tablet 100 milliGRAM(s) Oral daily  aspirin enteric coated 81 milliGRAM(s) Oral daily  atorvastatin 80 milliGRAM(s) Oral at bedtime  buDESOnide 160 MICROgram(s)/formoterol 4.5 MICROgram(s) Inhaler 2 Puff(s) Inhalation two times a day  dextrose 5%. 1000 milliLiter(s) (50 mL/Hr) IV Continuous <Continuous>  dextrose 50% Injectable 12.5 Gram(s) IV Push once  dextrose 50% Injectable 25 Gram(s) IV Push once  dextrose 50% Injectable 25 Gram(s) IV Push once  DOBUTamine Infusion 10 MICROgram(s)/kG/Min (22.5 mL/Hr) IV Continuous <Continuous>  docusate sodium 100 milliGRAM(s) Oral two times a day  finasteride 5 milliGRAM(s) Oral daily  levothyroxine 75 MICROGram(s) Oral daily  midodrine 10 milliGRAM(s) Oral three times a day  Nephro-lynda 1 Tablet(s) Oral daily  oseltamivir 30 milliGRAM(s) Oral <User Schedule>  polyethylene glycol 3350 17 Gram(s) Oral daily  senna 2 Tablet(s) Oral at bedtime    VITALS:  Vital Signs Last 24 Hrs  T(C): 36.4 (17 Feb 2018 12:40), Max: 36.9 (17 Feb 2018 03:00)  T(F): 97.5 (17 Feb 2018 12:40), Max: 98.4 (17 Feb 2018 03:00)  HR: 87 (17 Feb 2018 12:40) (83 - 103)  BP: 79/52 (17 Feb 2018 12:40) (62/37 - 97/48)  BP(mean): --  RR: 18 (17 Feb 2018 12:40) (18 - 20)  SpO2: 99% (17 Feb 2018 12:40) (90% - 100%)    I&O's Summary    16 Feb 2018 07:01  -  17 Feb 2018 07:00  --------------------------------------------------------  IN: 400 mL / OUT: 2000 mL / NET: -1600 mL      PHYSICAL EXAM:  Constitutional: NAD  HEENT: anicteric sclera, oropharynx clear, MMM  Neck: No JVD  Respiratory: Minimal bibasilar crackles.   Cardiovascular: S1, S2, RRR  Gastrointestinal: BS+, soft, NT/ND  Extremities: No cyanosis or clubbing. No peripheral LE edema  Neurological: A/O x 3, no focal deficits  Psychiatric: Normal mood, normal affect  : No CVA tenderness. No rosa.   Skin: No rashes  Vascular Access: RIJ tunneled cath     LABS:  02-17    130<L>  |  91<L>  |  18  ----------------------------<  126<H>  3.7   |  24  |  4.54<H>    Ca    8.1<L>      17 Feb 2018 05:20  Phos  2.5     02-17  Mg     1.8     02-17    TPro  7.4  /  Alb  2.6<L>  /  TBili  0.7  /  DBili  0.3<H>  /  AST  97<H>  /  ALT  50<H>  /  AlkPhos  184<H>  02-17                        11.1   4.97  )-----------( 80       ( 17 Feb 2018 05:20 )             35.5       Urine Studies:      RADIOLOGY & ADDITIONAL STUDIES:

## 2018-02-17 NOTE — PROVIDER CONTACT NOTE (OTHER) - ASSESSMENT
Patient complaining of feeling restless and uncomfortable. Temp 98.4 axillary, HR 94, BP 83/54, resp 18, SpO2 100% on BiPAP.

## 2018-02-17 NOTE — PROGRESS NOTE ADULT - PROBLEM SELECTOR PLAN 1
has underlying pulmonary fibrosis and respiratory failure precipitated by influenza: Did use bipap last night: Try to wean him off bipap: If necessary , use high flow oxygen  2/17 weaned off to Ventimask. Biapap and high flow as needed. will follow up CXR

## 2018-02-17 NOTE — PROGRESS NOTE ADULT - SUBJECTIVE AND OBJECTIVE BOX
This morning patient's BP dropped to 62/37  He complains of thirst but denies dizziness.  He notes that his dyspnea is somewhat worse.  He is fully awake alert, coherent and fully conversant.  He has been given 250 cc of NS so far  HD records reveal that 2800 cc and then 2000 cc were removed over the past 2 sessions  BP 78/51 HR 82 (AF)  Bilateral crackles  RR 1/6 systolic murmur  No edema    INR 3.12  WBC 4.97  Hgb 11.1  Hct 35.5  Plt 80K     Imp: Profound drop in BP is likely due to volume depletion in the setting of Influenza.  Given his profoundly poor LV function will give NS @ 100 cc/hr X 4 hours and then stop.  Hold on Levophed for now.

## 2018-02-17 NOTE — PROGRESS NOTE ADULT - PROBLEM SELECTOR PLAN 10
-DVT PPX: on Coumadin for AFib        Parul Marcos  Pager: [phone]  Weekday PM after 7 pm and Weekends after 12, page [phone]

## 2018-02-18 DIAGNOSIS — E03.9 HYPOTHYROIDISM, UNSPECIFIED: ICD-10-CM

## 2018-02-18 LAB
ALBUMIN SERPL ELPH-MCNC: 2.5 G/DL — LOW (ref 3.3–5)
ALP SERPL-CCNC: 186 U/L — HIGH (ref 40–120)
ALT FLD-CCNC: 49 U/L — HIGH (ref 4–41)
APTT BLD: 60.6 SEC — HIGH (ref 27.5–37.4)
AST SERPL-CCNC: 78 U/L — HIGH (ref 4–40)
BASOPHILS # BLD AUTO: 0.01 K/UL — SIGNIFICANT CHANGE UP (ref 0–0.2)
BASOPHILS NFR BLD AUTO: 0.2 % — SIGNIFICANT CHANGE UP (ref 0–2)
BILIRUB SERPL-MCNC: 0.7 MG/DL — SIGNIFICANT CHANGE UP (ref 0.2–1.2)
BUN SERPL-MCNC: 25 MG/DL — HIGH (ref 7–23)
CALCIUM SERPL-MCNC: 7.9 MG/DL — LOW (ref 8.4–10.5)
CHLORIDE SERPL-SCNC: 91 MMOL/L — LOW (ref 98–107)
CO2 SERPL-SCNC: 25 MMOL/L — SIGNIFICANT CHANGE UP (ref 22–31)
CREAT SERPL-MCNC: 5.71 MG/DL — HIGH (ref 0.5–1.3)
EOSINOPHIL # BLD AUTO: 0.11 K/UL — SIGNIFICANT CHANGE UP (ref 0–0.5)
EOSINOPHIL NFR BLD AUTO: 2.2 % — SIGNIFICANT CHANGE UP (ref 0–6)
GLUCOSE SERPL-MCNC: 147 MG/DL — HIGH (ref 70–99)
HCT VFR BLD CALC: 34.7 % — LOW (ref 39–50)
HGB BLD-MCNC: 11 G/DL — LOW (ref 13–17)
IMM GRANULOCYTES # BLD AUTO: 0.01 # — SIGNIFICANT CHANGE UP
IMM GRANULOCYTES NFR BLD AUTO: 0.2 % — SIGNIFICANT CHANGE UP (ref 0–1.5)
INR BLD: 3.81 — HIGH (ref 0.88–1.17)
LYMPHOCYTES # BLD AUTO: 0.85 K/UL — LOW (ref 1–3.3)
LYMPHOCYTES # BLD AUTO: 17.3 % — SIGNIFICANT CHANGE UP (ref 13–44)
MAGNESIUM SERPL-MCNC: 1.9 MG/DL — SIGNIFICANT CHANGE UP (ref 1.6–2.6)
MCHC RBC-ENTMCNC: 30.5 PG — SIGNIFICANT CHANGE UP (ref 27–34)
MCHC RBC-ENTMCNC: 31.7 % — LOW (ref 32–36)
MCV RBC AUTO: 96.1 FL — SIGNIFICANT CHANGE UP (ref 80–100)
MONOCYTES # BLD AUTO: 0.52 K/UL — SIGNIFICANT CHANGE UP (ref 0–0.9)
MONOCYTES NFR BLD AUTO: 10.6 % — SIGNIFICANT CHANGE UP (ref 2–14)
NEUTROPHILS # BLD AUTO: 3.42 K/UL — SIGNIFICANT CHANGE UP (ref 1.8–7.4)
NEUTROPHILS NFR BLD AUTO: 69.5 % — SIGNIFICANT CHANGE UP (ref 43–77)
NRBC # FLD: 0 — SIGNIFICANT CHANGE UP
PHOSPHATE SERPL-MCNC: 3.6 MG/DL — SIGNIFICANT CHANGE UP (ref 2.5–4.5)
PLATELET # BLD AUTO: 85 K/UL — LOW (ref 150–400)
PMV BLD: 12.8 FL — SIGNIFICANT CHANGE UP (ref 7–13)
POTASSIUM SERPL-MCNC: 3.8 MMOL/L — SIGNIFICANT CHANGE UP (ref 3.5–5.3)
POTASSIUM SERPL-SCNC: 3.8 MMOL/L — SIGNIFICANT CHANGE UP (ref 3.5–5.3)
PROT SERPL-MCNC: 7.2 G/DL — SIGNIFICANT CHANGE UP (ref 6–8.3)
PROTHROM AB SERPL-ACNC: 43.5 SEC — HIGH (ref 9.8–13.1)
RBC # BLD: 3.61 M/UL — LOW (ref 4.2–5.8)
RBC # FLD: 16.3 % — HIGH (ref 10.3–14.5)
SODIUM SERPL-SCNC: 130 MMOL/L — LOW (ref 135–145)
WBC # BLD: 4.92 K/UL — SIGNIFICANT CHANGE UP (ref 3.8–10.5)
WBC # FLD AUTO: 4.92 K/UL — SIGNIFICANT CHANGE UP (ref 3.8–10.5)

## 2018-02-18 RX ORDER — MAGNESIUM SULFATE 500 MG/ML
1 VIAL (ML) INJECTION ONCE
Qty: 0 | Refills: 0 | Status: COMPLETED | OUTPATIENT
Start: 2018-02-18 | End: 2018-02-18

## 2018-02-18 RX ORDER — NOREPINEPHRINE BITARTRATE/D5W 8 MG/250ML
0.1 PLASTIC BAG, INJECTION (ML) INTRAVENOUS
Qty: 8 | Refills: 0 | Status: DISCONTINUED | OUTPATIENT
Start: 2018-02-18 | End: 2018-02-23

## 2018-02-18 RX ORDER — BUDESONIDE, MICRONIZED 100 %
2 POWDER (GRAM) MISCELLANEOUS
Qty: 0 | Refills: 0 | Status: DISCONTINUED | OUTPATIENT
Start: 2018-02-18 | End: 2018-03-23

## 2018-02-18 RX ORDER — MIDODRINE HYDROCHLORIDE 2.5 MG/1
30 TABLET ORAL ONCE
Qty: 0 | Refills: 0 | Status: COMPLETED | OUTPATIENT
Start: 2018-02-18 | End: 2018-02-18

## 2018-02-18 RX ORDER — ONDANSETRON 8 MG/1
4 TABLET, FILM COATED ORAL ONCE
Qty: 0 | Refills: 0 | Status: COMPLETED | OUTPATIENT
Start: 2018-02-18 | End: 2018-02-18

## 2018-02-18 RX ORDER — MIDODRINE HYDROCHLORIDE 2.5 MG/1
30 TABLET ORAL THREE TIMES A DAY
Qty: 0 | Refills: 0 | Status: DISCONTINUED | OUTPATIENT
Start: 2018-02-18 | End: 2018-03-23

## 2018-02-18 RX ADMIN — Medication 22.5 MICROGRAM(S)/KG/MIN: at 22:13

## 2018-02-18 RX ADMIN — Medication 3 MILLILITER(S): at 20:18

## 2018-02-18 RX ADMIN — ONDANSETRON 4 MILLIGRAM(S): 8 TABLET, FILM COATED ORAL at 23:46

## 2018-02-18 RX ADMIN — AMIODARONE HYDROCHLORIDE 100 MILLIGRAM(S): 400 TABLET ORAL at 05:27

## 2018-02-18 RX ADMIN — Medication 3 MILLILITER(S): at 16:51

## 2018-02-18 RX ADMIN — FINASTERIDE 5 MILLIGRAM(S): 5 TABLET, FILM COATED ORAL at 12:23

## 2018-02-18 RX ADMIN — MIDODRINE HYDROCHLORIDE 30 MILLIGRAM(S): 2.5 TABLET ORAL at 22:13

## 2018-02-18 RX ADMIN — MIDODRINE HYDROCHLORIDE 30 MILLIGRAM(S): 2.5 TABLET ORAL at 15:14

## 2018-02-18 RX ADMIN — Medication 1 TABLET(S): at 12:22

## 2018-02-18 RX ADMIN — Medication 3 MILLILITER(S): at 00:45

## 2018-02-18 RX ADMIN — Medication 75 MICROGRAM(S): at 05:27

## 2018-02-18 RX ADMIN — Medication 3 MILLILITER(S): at 14:07

## 2018-02-18 RX ADMIN — Medication 1: at 13:01

## 2018-02-18 RX ADMIN — Medication 81 MILLIGRAM(S): at 12:23

## 2018-02-18 RX ADMIN — Medication 100 MILLIGRAM(S): at 05:27

## 2018-02-18 RX ADMIN — Medication 3 MILLILITER(S): at 09:40

## 2018-02-18 RX ADMIN — BUDESONIDE AND FORMOTEROL FUMARATE DIHYDRATE 2 PUFF(S): 160; 4.5 AEROSOL RESPIRATORY (INHALATION) at 09:38

## 2018-02-18 RX ADMIN — Medication 3 MILLILITER(S): at 04:40

## 2018-02-18 RX ADMIN — Medication 14.06 MICROGRAM(S)/KG/MIN: at 22:13

## 2018-02-18 RX ADMIN — Medication 100 GRAM(S): at 07:51

## 2018-02-18 RX ADMIN — MIDODRINE HYDROCHLORIDE 10 MILLIGRAM(S): 2.5 TABLET ORAL at 05:27

## 2018-02-18 RX ADMIN — ATORVASTATIN CALCIUM 80 MILLIGRAM(S): 80 TABLET, FILM COATED ORAL at 22:13

## 2018-02-18 NOTE — PROGRESS NOTE ADULT - PROBLEM SELECTOR PLAN 4
.  - Anuric, HD MWF.   - Discussed / nephro for possible ultrafiltration today d/t volume overload and poor cardiac fxn  - Avoid nephrotoxic agents  - Renally dose meds

## 2018-02-18 NOTE — PROGRESS NOTE ADULT - PROBLEM SELECTOR PLAN 6
.  - Baseline 4-5L O2 at home.  - c/w supplemental O2  - Venturi mask changed to BiPAP for now to improve diuresis.  - Symbicort changed to Pulmicort  - Duonebs

## 2018-02-18 NOTE — PROGRESS NOTE ADULT - SUBJECTIVE AND OBJECTIVE BOX
Pt seen and examined at bedside    Pt c/o SOB, feeling weak.   remains on Dobutamine drip  xferred to CCU yesterday, started on levophed    Allergies:  No Known Allergies    Hospital Medications:   MEDICATIONS  (STANDING):  ALBUTerol/ipratropium for Nebulization 3 milliLiter(s) Nebulizer every 4 hours  amiodarone    Tablet 100 milliGRAM(s) Oral daily  aspirin enteric coated 81 milliGRAM(s) Oral daily  atorvastatin 80 milliGRAM(s) Oral at bedtime  buDESOnide 160 MICROgram(s)/formoterol 4.5 MICROgram(s) Inhaler 2 Puff(s) Inhalation two times a day  dextrose 5%. 1000 milliLiter(s) (50 mL/Hr) IV Continuous <Continuous>  dextrose 50% Injectable 12.5 Gram(s) IV Push once  dextrose 50% Injectable 25 Gram(s) IV Push once  dextrose 50% Injectable 25 Gram(s) IV Push once  DOBUTamine Infusion 10 MICROgram(s)/kG/Min (22.5 mL/Hr) IV Continuous <Continuous>  docusate sodium 100 milliGRAM(s) Oral two times a day  finasteride 5 milliGRAM(s) Oral daily  levothyroxine 75 MICROGram(s) Oral daily  midodrine 10 milliGRAM(s) Oral three times a day  Nephro-lynda 1 Tablet(s) Oral daily  oseltamivir 30 milliGRAM(s) Oral <User Schedule>  polyethylene glycol 3350 17 Gram(s) Oral daily  senna 2 Tablet(s) Oral at bedtime    VITALS:  Vital Signs Last 24 Hrs  T(C): 36.7 (18 Feb 2018 08:00), Max: 36.8 (18 Feb 2018 00:00)  T(F): 98.1 (18 Feb 2018 08:00), Max: 98.2 (18 Feb 2018 00:00)  HR: 94 (18 Feb 2018 12:00) (75 - 98)  BP: 88/52 (18 Feb 2018 12:00) (70/47 - 98/55)  BP(mean): 61 (18 Feb 2018 12:00) (48 - 70)  RR: 22 (18 Feb 2018 12:00) (14 - 24)  SpO2: 92% (18 Feb 2018 12:00) (84% - 100%)    I&O's Summary    17 Feb 2018 07:01  -  18 Feb 2018 07:00  --------------------------------------------------------  IN: 852.5 mL / OUT: 0 mL / NET: 852.5 mL    18 Feb 2018 07:01  -  18 Feb 2018 13:05  --------------------------------------------------------  IN: 146 mL / OUT: 0 mL / NET: 146 mL    PHYSICAL EXAM:  Constitutional: NAD  HEENT: anicteric sclera, oropharynx clear, MMM  Neck: No JVD  Respiratory: Minimal bibasilar crackles.   Cardiovascular: S1, S2, RRR  Gastrointestinal: BS+, soft, NT/ND  Extremities: No cyanosis or clubbing. No peripheral LE edema  Neurological: A/O x 3, no focal deficits  Psychiatric: Normal mood, normal affect  : No CVA tenderness. No rosa.   Skin: No rashes  Vascular Access: RIJ tunneled cath     LABS:  02-18    130<L>  |  91<L>  |  25<H>  ----------------------------<  147<H>  3.8   |  25  |  5.71<H>    Ca    7.9<L>      18 Feb 2018 04:10  Phos  3.6     02-18  Mg     1.9     02-18    TPro  7.2  /  Alb  2.5<L>  /  TBili  0.7  /  DBili  x   /  AST  78<H>  /  ALT  49<H>  /  AlkPhos  186<H>  02-18                        11.0   4.92  )-----------( 85       ( 18 Feb 2018 04:10 )             34.7       Urine Studies:      RADIOLOGY & ADDITIONAL STUDIES:

## 2018-02-18 NOTE — PROGRESS NOTE ADULT - PROBLEM SELECTOR PLAN 1
Profound drop in BP is likely due to volume depletion in the setting of Influenza.  Given his profoundly poor LV function will give NS @ 100 cc/hr X 4 hours and then stop.  Hold on Levophed for now. Acute on chronic HoTN (baseline SBP 90s-100s). Profound drop in BP is likely due to volume depletion in the setting of Influenza. s/p 450cc yesterday.   - Levophed gtt started 2/17  - Possible ultrafiltration today for volume overload Acute on chronic HoTN (baseline SBP 90s-100s). Profound drop in BP is likely due to volume depletion in the setting of Influenza. s/p 450cc yesterday.   - Levophed gtt started 2/17  - c/w home midodrine.  - Discussed with Renal for possible ultrafiltration today for volume overload .  - Acute on chronic HoTN (baseline SBP 90s-100s). Profound drop in BP is likely due to volume depletion in the setting of Influenza. s/p 450cc yesterday.   - Levophed gtt started 2/17  - c/w home midodrine.  - Discussed with Renal for possible ultrafiltration today for volume overload

## 2018-02-18 NOTE — PROGRESS NOTE ADULT - ASSESSMENT
63yo M with ESRD (M/W/F), NICM (EF 21%, s/p ICD, PICC with home dobutamine gtt), AFib (on coumadin), COPD on home O2 (4-5L), pulmonary fibrosis, hypotension (on midodrine) admitted with Acute on Chronic Respiratory Failure 2/2 influenza B infection s/p tamiflu x 5d exacerbating underlying pulmonary fibrosis/COPD, now with worsening hypotension. 63yo M with ESRD (M/W/F), NICM (EF 21%, s/p ICD, PICC with home dobutamine gtt), AFib (on coumadin), COPD on home O2 (4-5L), pulmonary fibrosis, hypotension (on midodrine) admitted with Acute on Chronic Respiratory Failure 2/2 influenza B infection s/p tamiflu x 5d exacerbating underlying pulmonary fibrosis/COPD, transferred to CCU for acute on chronic hypotension.

## 2018-02-18 NOTE — PROGRESS NOTE ADULT - PROBLEM SELECTOR PLAN 3
.  - c/w amiodarone   - Holding Coumadin in setting of supratherapeutic INR. Daily INR, redose Coumadin when therapeutic.

## 2018-02-18 NOTE — PROGRESS NOTE ADULT - PROBLEM SELECTOR PLAN 4
Cont bronchodilators: Pt has not been wheezing much  2/17 wheezing today. On Duoneb and Symbicort.  2/18 wheezing but less than yesterday. Continue current management

## 2018-02-18 NOTE — PROGRESS NOTE ADULT - PROBLEM SELECTOR PLAN 2
- Tamiflu discontinued yesterday due to side effects of tremors  -supportive care. - s/p Tamiflu x 5 days  - supportive care. .  - TTE Oct 2017: EF 21%, severely dilated LA, mod LV enlargement, Severe global LV syst dysfxn, RV enlargement w/ decreased RV syst fxn, mod TR, mild LA.  - c/w dobutamine gtt

## 2018-02-18 NOTE — PROGRESS NOTE ADULT - PROBLEM SELECTOR PLAN 1
has underlying pulmonary fibrosis and respiratory failure precipitated by influenza: Did use bipap last night: Try to wean him off bipap: If necessary , use high flow oxygen  2/17 weaned off to Ventimask. Biapap and high flow as needed. will follow up CXR  2/18 wean off Ventimask to keep O2 sat greater than 90%

## 2018-02-18 NOTE — PROGRESS NOTE ADULT - SUBJECTIVE AND OBJECTIVE BOX
Now on Norepi drip for hypotension as well as BIPAP  He continues to complain of thirst  BP 88/52  HR 94 (AF)  Lungs bilat rhonchi and crackles  Irregular rhythm 1-2/6 systolic murmur  No edema    INR 3.81  WBC 4.92  Hgb 11.0 Hct 34.7  Plt 85K    Imp: Severe non ischemic cardiomyopathy with AF and Influenza and marginal BP which has improved.  Rec: Resume usual dose of Midodrine 30 mg tid and taper off the Norepi as long as the systolic BP is > 85 mm Hg   Hold off on ultrafiltration today as I believe he is still volume depleted.  Resume usual HD in the AM.   Hold Coumadin    Resume VM or NC as long as the O2 Sat is > 93%

## 2018-02-18 NOTE — PROGRESS NOTE ADULT - PROBLEM SELECTOR PLAN 9
DVT: Redosing Coumadin when INR therapeutic.    Sean Martínez MD  PGY-1 | Internal Medicine  [phone] / [phone] .  - DVT: Redosing Coumadin when INR therapeutic.    Sean Martínez MD  PGY-1 | Internal Medicine  [phone] / [phone]

## 2018-02-18 NOTE — PROGRESS NOTE ADULT - SUBJECTIVE AND OBJECTIVE BOX
PATIENT: KURT STRONG   AGE: 63yo   GENDER: Male  ALLERGIES: No Known Allergies    CHIEF COMPLAINT:   SOB x few hours (2018 22:11)    INTERVAL EVENTS:     BP 70s-90s / 40s-60s, on  7.5 at home, increased to 10 overnight, then back to 7.5 and Levophed gtt started.  on venturi mask 50% FiO2 (on 4-5L O2 at home), getting IV mag      MEDICATIONS  ALBUTerol/ipratropium for Nebulization 3 milliLiter(s) Nebulizer every 4 hours  amiodarone    Tablet 100 milliGRAM(s) Oral daily  aspirin enteric coated 81 milliGRAM(s) Oral daily  atorvastatin 80 milliGRAM(s) Oral at bedtime  buDESOnide 160 MICROgram(s)/formoterol 4.5 MICROgram(s) Inhaler 2 Puff(s) Inhalation two times a day  chlorhexidine 4% Liquid 1 Application(s) Topical daily  dextrose 5%. 1000 milliLiter(s) (50 mL/Hr) IV Continuous <Continuous>  dextrose 5%. 1000 milliLiter(s) (50 mL/Hr) IV Continuous <Continuous>  dextrose 50% Injectable 12.5 Gram(s) IV Push once  dextrose 50% Injectable 25 Gram(s) IV Push once  dextrose 50% Injectable 25 Gram(s) IV Push once  dextrose Gel 1 Dose(s) Oral once PRN Blood Glucose LESS THAN 70 milliGRAM(s)/deciLiter  dextrose Gel 1 Dose(s) Oral once PRN Blood Glucose LESS THAN 70 milliGRAM(s)/deciliter  DOBUTamine Infusion 10 MICROgram(s)/kG/Min (22.5 mL/Hr) IV Continuous <Continuous>  docusate sodium 100 milliGRAM(s) Oral two times a day  finasteride 5 milliGRAM(s) Oral daily  glucagon  Injectable 1 milliGRAM(s) IntraMuscular once PRN Glucose <70 milliGRAM(s)/deciLiter  influenza   Vaccine 0.5 milliLiter(s) IntraMuscular once  insulin lispro (HumaLOG) corrective regimen sliding scale   SubCutaneous three times a day before meals  insulin lispro (HumaLOG) corrective regimen sliding scale   SubCutaneous at bedtime  levothyroxine 75 MICROGram(s) Oral daily  midodrine 10 milliGRAM(s) Oral three times a day  Nephro-lynda 1 Tablet(s) Oral daily  norepinephrine Infusion 0.1 MICROgram(s)/kG/Min (14.063 mL/Hr) IV Continuous <Continuous>  polyethylene glycol 3350 17 Gram(s) Oral daily  senna 2 Tablet(s) Oral at bedtime  sodium chloride 0.9%. 1000 milliLiter(s) (100 mL/Hr) IV Continuous <Continuous>      VITAL SIGNS (last 24 hrs):  ICU Vital Signs Last 24 Hrs  T(C): 36.6 (2018 04:00), Max: 36.8 (2018 00:00)  T(F): 97.8 (2018 04:00), Max: 98.2 (2018 00:00)  HR: 82 (2018 08:) (75 - 98)  BP: 85/48 (:) (62/37 - 98/55)  BP(mean): 57 (2018 08:) (48 - 70)  ABP: --  ABP(mean): --  RR: 18 (:) (14 - 22)  SpO2: 97% (:) (85% - 100%)      PHYSICAL EXAM:  GENERAL: NAD  HEENT:  AT/NC; EOMI, PERRLA, conjunctiva and sclera clear; hearing grossly intact  NECK: Supple, non tender  CHEST/LUNG: CTAB, no wheezes, ronchi, rales  HEART: Normal rate, regular rhythm; No murmurs, rubs, or gallops; No JVD or peripheral edema  ABDOMEN: soft, NTTP, nondistended, normoactive BS  MSK/EXTREMITIES:  Grossly normal strength, movement, tone, and ROM; Palpable peripheral pulses; No clubbing or cyanosis  PSYCH: AAOx4  NEUROLOGY: Non focal   SKIN: No rashes or lesions      WEIGHT:   Admission Weight (kg): 75.3 (18)  Daily Weight (kg): 75.3 (18)    I/O SUMMARY:    18 @ 07:01  -  18 @ 07:00  --------------------------------------------------------  IN: 852.5 mL / OUT: 0 mL / NET: 852.5 mL    18 @ 07:01  -  18 @ 08:54  --------------------------------------------------------  IN: 32 mL / OUT: 0 mL / NET: 32 mL        LABS:                         11.0   4.92  >-----< 85            ( 18 @ 04:10 )             34.7       130  |  91  |   25  ----------------------< 147    (18 04:10)     3.8  |  25  |  5.71    Anion Gap: --    Ca   7.9   (18 @ 04:10)  Mg   1.9   (18 @ 04:10)  Phos 3.6   (18 04:10)       TP 7.9     |  AST 2.5    -------------------------     Alb x     |  ALT x               (18 @ 04:10)  -------------------------     T-bili 2.5  |  AlkPh 186    D-bili x   COAGULATION STUDIES:     aPTT  60.6 SEC    (18 04:10)     PT     43.5 SEC    (18 04:10)     INR    3.81          (18 04:10), COAGULATION STUDIES:     aPTT  56.2 SEC    (18 @ 05:20)     PT     36.9 SEC    (18 @ 05:20)     INR    3.13          (18 05:20), COAGULATION STUDIES:     aPTT  55.7 SEC    (18 18:16)     PT     44.8 SEC    (18 18:16)     INR    3.92          (18 18:16)     POCT Blood Glucose.: 127 mg/dL (02-18-18 @ 08:21)  POCT Blood Glucose.: 129 mg/dL (18 @ 22:09)  POCT Blood Glucose.: 86 mg/dL (18 @ 17:59)  POCT Blood Glucose.: 133 mg/dL (18 @ 10:51)        MICROBIOLOGY:      ECHO:       RADIOLOGY & ADDITIONAL TESTS: PATIENT: KURT STRONG   AGE: 61yo   GENDER: Male  ALLERGIES: No Known Allergies    CHIEF COMPLAINT:   SOB x few hours (2018 22:11)    INTERVAL EVENTS:     BP 70s-90s / 40s-60s, on  7.5 at home, increased to 10 overnight, then back to 7.5 and Levophed gtt started.  On venturi mask 50% FiO2 (on 4-5L O2 at home), getting IV mag  Pt endorses overall improvement, though still SOB. Denies CP, orthostasis, dizziness, HA, N/V, abd pain.      MEDICATIONS  ALBUTerol/ipratropium for Nebulization 3 milliLiter(s) Nebulizer every 4 hours  amiodarone    Tablet 100 milliGRAM(s) Oral daily  aspirin enteric coated 81 milliGRAM(s) Oral daily  atorvastatin 80 milliGRAM(s) Oral at bedtime  buDESOnide 160 MICROgram(s)/formoterol 4.5 MICROgram(s) Inhaler 2 Puff(s) Inhalation two times a day  chlorhexidine 4% Liquid 1 Application(s) Topical daily  dextrose 5%. 1000 milliLiter(s) (50 mL/Hr) IV Continuous <Continuous>  dextrose 5%. 1000 milliLiter(s) (50 mL/Hr) IV Continuous <Continuous>  dextrose 50% Injectable 12.5 Gram(s) IV Push once  dextrose 50% Injectable 25 Gram(s) IV Push once  dextrose 50% Injectable 25 Gram(s) IV Push once  dextrose Gel 1 Dose(s) Oral once PRN Blood Glucose LESS THAN 70 milliGRAM(s)/deciLiter  dextrose Gel 1 Dose(s) Oral once PRN Blood Glucose LESS THAN 70 milliGRAM(s)/deciliter  DOBUTamine Infusion 10 MICROgram(s)/kG/Min (22.5 mL/Hr) IV Continuous <Continuous>  docusate sodium 100 milliGRAM(s) Oral two times a day  finasteride 5 milliGRAM(s) Oral daily  glucagon  Injectable 1 milliGRAM(s) IntraMuscular once PRN Glucose <70 milliGRAM(s)/deciLiter  influenza   Vaccine 0.5 milliLiter(s) IntraMuscular once  insulin lispro (HumaLOG) corrective regimen sliding scale   SubCutaneous three times a day before meals  insulin lispro (HumaLOG) corrective regimen sliding scale   SubCutaneous at bedtime  levothyroxine 75 MICROGram(s) Oral daily  midodrine 10 milliGRAM(s) Oral three times a day  Nephro-lynda 1 Tablet(s) Oral daily  norepinephrine Infusion 0.1 MICROgram(s)/kG/Min (14.063 mL/Hr) IV Continuous <Continuous>  polyethylene glycol 3350 17 Gram(s) Oral daily  senna 2 Tablet(s) Oral at bedtime  sodium chloride 0.9%. 1000 milliLiter(s) (100 mL/Hr) IV Continuous <Continuous>      VITAL SIGNS (last 24 hrs):  ICU Vital Signs Last 24 Hrs  T(C): 36.6 (2018 04:00), Max: 36.8 (2018 00:00)  T(F): 97.8 (2018 04:00), Max: 98.2 (2018 00:00)  HR: 82 (2018 08:00) (75 - 98)  BP: 85/48 (2018 08:00) (62/37 - 98/55)  BP(mean): 57 (2018 08:) (48 - 70)  ABP: --  ABP(mean): --  RR: 18 (2018 08:00) (14 - 22)  SpO2: 97% (2018 08:) (85% - 100%)      PHYSICAL EXAM:  GENERAL: Ill-appearing  HEENT:  AT/NC; EOMI, PERRLA, conjunctiva and sclera clear; hearing grossly intact  NECK: Supple, non tender  CHEST/LUNG: B/L ronchi, wheezes; on venturi mask 50% FiO2  HEART: Normal rate, regular rhythm; No murmurs, rubs, or gallops; No JVD or peripheral edema  ABDOMEN: soft, NTTP, nondistended, normoactive BS  MSK/EXTREMITIES:  Grossly normal strength, movement, tone, and ROM; Palpable peripheral pulses  NEUROLOGY: Non focal   SKIN: No rashes or lesions      WEIGHT:   Admission Weight (kg): 75.3 (18)  Daily Weight (kg): 75.3 (18)    I/O SUMMARY:    18 @ 07:01  -  18 @ 07:00  --------------------------------------------------------  IN: 852.5 mL / OUT: 0 mL / NET: 852.5 mL    18 @ 07:01  -  18 @ 08:54  --------------------------------------------------------  IN: 32 mL / OUT: 0 mL / NET: 32 mL        LABS:                         11.0   4.92  >-----< 85            ( 18 @ 04:10 )             34.7       130  |  91  |   25  ----------------------< 147    (18 @ 04:10)     3.8  |  25  |  5.71    Anion Gap: --    Ca   7.9   (18 @ 04:10)  Mg   1.9   (18 @ 04:10)  Phos 3.6   (18 @ 04:10)       TP 7.9     |  AST 2.5    -------------------------     Alb x     |  ALT x               (18 @ 04:10)  -------------------------     T-bili 2.5  |  AlkPh 186    D-bili x   COAGULATION STUDIES:     aPTT  60.6 SEC    (18 @ 04:10)     PT     43.5 SEC    (18 @ 04:10)     INR    3.81          (18 @ 04:10), COAGULATION STUDIES:     aPTT  56.2 SEC    (18 @ 05:20)     PT     36.9 SEC    (18 @ 05:20)     INR    3.13          (18 @ 05:20), COAGULATION STUDIES:     aPTT  55.7 SEC    (18 @ 18:16)     PT     44.8 SEC    (18 @ 18:16)     INR    3.92          (18 @ 18:16)     POCT Blood Glucose.: 127 mg/dL (18 @ 08:21)  POCT Blood Glucose.: 129 mg/dL (18 @ 22:09)  POCT Blood Glucose.: 86 mg/dL (18 @ 17:59)  POCT Blood Glucose.: 133 mg/dL (18 @ 10:51)        MICROBIOLOGY:      ECHO:       RADIOLOGY & ADDITIONAL TESTS: PATIENT: PATRICE PLASCENCIA   AGE: 61yo   GENDER: Male  ALLERGIES: No Known Allergies    CHIEF COMPLAINT:   SOB x few hours (2018 22:11)    INTERVAL EVENTS:     BP 70s-90s / 40s-60s, on  7.5 at home, increased to 10 overnight, then back to 7.5 and Levophed gtt started.  On venturi mask 50% FiO2 (on 4-5L O2 at home), getting IV mag  Pt endorses overall improvement, though still SOB. Denies CP, orthostasis, dizziness, HA, N/V, abd pain.      MEDICATIONS  ALBUTerol/ipratropium for Nebulization 3 milliLiter(s) Nebulizer every 4 hours  amiodarone    Tablet 100 milliGRAM(s) Oral daily  aspirin enteric coated 81 milliGRAM(s) Oral daily  atorvastatin 80 milliGRAM(s) Oral at bedtime  buDESOnide 160 MICROgram(s)/formoterol 4.5 MICROgram(s) Inhaler 2 Puff(s) Inhalation two times a day  chlorhexidine 4% Liquid 1 Application(s) Topical daily  dextrose 5%. 1000 milliLiter(s) (50 mL/Hr) IV Continuous <Continuous>  dextrose 5%. 1000 milliLiter(s) (50 mL/Hr) IV Continuous <Continuous>  dextrose 50% Injectable 12.5 Gram(s) IV Push once  dextrose 50% Injectable 25 Gram(s) IV Push once  dextrose 50% Injectable 25 Gram(s) IV Push once  dextrose Gel 1 Dose(s) Oral once PRN Blood Glucose LESS THAN 70 milliGRAM(s)/deciLiter  dextrose Gel 1 Dose(s) Oral once PRN Blood Glucose LESS THAN 70 milliGRAM(s)/deciliter  DOBUTamine Infusion 10 MICROgram(s)/kG/Min (22.5 mL/Hr) IV Continuous <Continuous>  docusate sodium 100 milliGRAM(s) Oral two times a day  finasteride 5 milliGRAM(s) Oral daily  glucagon  Injectable 1 milliGRAM(s) IntraMuscular once PRN Glucose <70 milliGRAM(s)/deciLiter  influenza   Vaccine 0.5 milliLiter(s) IntraMuscular once  insulin lispro (HumaLOG) corrective regimen sliding scale   SubCutaneous three times a day before meals  insulin lispro (HumaLOG) corrective regimen sliding scale   SubCutaneous at bedtime  levothyroxine 75 MICROGram(s) Oral daily  midodrine 10 milliGRAM(s) Oral three times a day  Nephro-lynda 1 Tablet(s) Oral daily  norepinephrine Infusion 0.1 MICROgram(s)/kG/Min (14.063 mL/Hr) IV Continuous <Continuous>  polyethylene glycol 3350 17 Gram(s) Oral daily  senna 2 Tablet(s) Oral at bedtime  sodium chloride 0.9%. 1000 milliLiter(s) (100 mL/Hr) IV Continuous <Continuous>      VITAL SIGNS (last 24 hrs):  ICU Vital Signs Last 24 Hrs  T(C): 36.6 (2018 04:00), Max: 36.8 (2018 00:00)  T(F): 97.8 (2018 04:00), Max: 98.2 (2018 00:00)  HR: 82 (2018 08:00) (75 - 98)  BP: 85/48 (2018 08:00) (62/37 - 98/55)  BP(mean): 57 (2018 08:) (48 - 70)  ABP: --  ABP(mean): --  RR: 18 (2018 08:00) (14 - 22)  SpO2: 97% (2018 08:) (85% - 100%)      PHYSICAL EXAM:  GENERAL: Ill-appearing  HEENT:  AT/NC; EOMI, PERRLA, conjunctiva and sclera clear; hearing grossly intact  NECK: Supple, non tender  CHEST/LUNG: B/L ronchi, wheezes; on venturi mask 50% FiO2  HEART: Normal rate, regular rhythm; No murmurs, rubs, or gallops; No JVD or peripheral edema  ABDOMEN: soft, NTTP, nondistended, normoactive BS  MSK/EXTREMITIES:  Grossly normal strength, movement, tone, and ROM; Palpable peripheral pulses  NEUROLOGY: Non focal   SKIN: No rashes or lesions      WEIGHT:   Admission Weight (kg): 75.3 (18)  Daily Weight (kg): 75.3 (18)    I/O SUMMARY:    18 @ 07:01  -  18 @ 07:00  --------------------------------------------------------  IN: 852.5 mL / OUT: 0 mL / NET: 852.5 mL    18 @ 07:01  -  18 @ 08:54  --------------------------------------------------------  IN: 32 mL / OUT: 0 mL / NET: 32 mL        LABS:                         11.0   4.92  >-----< 85            ( 18 @ 04:10 )             34.7       130  |  91  |   25  ----------------------< 147    (18 @ 04:10)     3.8  |  25  |  5.71    Anion Gap: --    Ca   7.9   (18 @ 04:10)  Mg   1.9   (18 @ 04:10)  Phos 3.6   (18 @ 04:10)       TP 7.9     |  AST 2.5    -------------------------     Alb x     |  ALT x               (18 @ 04:10)  -------------------------     T-bili 2.5  |  AlkPh 186    D-bili x   COAGULATION STUDIES:     aPTT  60.6 SEC    (18 @ 04:10)     PT     43.5 SEC    (18 @ 04:10)     INR    3.81          (18 @ 04:10), COAGULATION STUDIES:     aPTT  56.2 SEC    (18 @ 05:20)     PT     36.9 SEC    (18 @ 05:20)     INR    3.13          (18 @ 05:20), COAGULATION STUDIES:     aPTT  55.7 SEC    (18 @ 18:16)     PT     44.8 SEC    (18 @ 18:16)     INR    3.92          (18 @ 18:16)     POCT Blood Glucose.: 127 mg/dL (18 @ 08:21)  POCT Blood Glucose.: 129 mg/dL (18 @ 22:09)  POCT Blood Glucose.: 86 mg/dL (18 @ 17:59)  POCT Blood Glucose.: 133 mg/dL (18 @ 10:51)        MICROBIOLOGY:      ECHO:     < from: Transthoracic Echocardiogram (10.18.17 @ 09:48) >    Patient name: Patrice Plascencia  YOB: 1955   Age: 62 (M)   MR#: [id number]  Study Date: 10/18/2017  [address]: Mery Garcia  Study quality: Technically good  Referring Physician: Channing Elena MD  Blood Pressure: 81/46 mmHg  Height: 180 cm  Weight: 79 kg  BSA: 2 m2  ------------------------------------------------------------------------  PROCEDURE: Transthoracic echocardiogram with 2-D, M-Mode  and complete spectral and color flow Doppler.  Patient was injected with 10 cc's of aerosolized saline.  INDICATION: Unspecified atrial fibrillation (I48.91),  Unspecified atrial flutter (I48.92)  ------------------------------------------------------------------------  DIMENSIONS:  Dimensions:     Normal Values:  LA:     5.4 cm    2.0 - 4.0 cm  Ao:     2.4 cm    2.0 - 3.8 cm  SEPTUM: 0.7 cm    0.6 - 1.2 cm  PWT:    1.2 cm    0.6 - 1.1 cm  LVIDd:  6.2 cm    3.0 - 5.6 cm  LVIDs:  5.6 cm    1.8 - 4.0 cm  Derived Variables:  LVMI: 123 g/m2  RWT: 0.38  Fractional short: 10 %  Ejection Fraction (Teicholtz): 21 %  ------------------------------------------------------------------------  OBSERVATIONS:  Mitral Valve: Normal mitral valve. Mild mitral  regurgitation.  Aortic Root: Normal aortic root.  Aortic Valve: Normal trileaflet aortic valve.  Left Atrium: Severely dilated left atrium.  LA volume index  = 56 cc/m2.  Left Ventricle: Severe global left ventricular systolic  dysfunction. Flattening of the interventricular septum in  both systole and diastole is  consistent with right  ventricular pressure overload. Moderate left ventricular  enlargement.  Right Heart: Moderate right atrial enlargement. Right  ventricular enlargement with decreased right ventricular  systolic function. A device wire is noted in the right  heart. Normal tricuspid valve.  Moderate tricuspid  regurgitation. Normal pulmonic valve. Mild pulmonic  regurgitation.  Pericardium/PleuraNormal pericardium with trace pericardial  effusion.  Hemodynamic: Estimated right ventricular systolic pressure  equals 55 mm Hg, assuming right atrial pressure equals 10  mm Hg, consistent with moderate pulmonary hypertension.  Agitated saline injection resulted in a large amount of  bubbles seen in the right heart. Although the exact amount  of cardiac cycles that occurred prior to the crossing of  the bubbles is unclear, the findings are suggestive of a  significant shunt. Consider MARGIE for further workup, if  clinically warrnated.  ------------------------------------------------------------------------  CONCLUSIONS:  1. Normal trileaflet aortic valve.  2. Severely dilated left atrium.  LA volume index = 56  cc/m2.  3. Moderate left ventricular enlargement.  4. Severe global left ventricular systolic dysfunction.  Flattening of the interventricular septum in both systole  and diastole is  consistent with right ventricular pressure  overload.  5. Right ventricular enlargement with decreased right  ventricular systolic function. A device wire is noted in  the right heart.  6. Normal tricuspid valve.  Moderate tricuspid  regurgitation.  7. Normal pulmonic valve. Mild pulmonic regurgitation.  Estimated pulmonary artery systolic pressure equals 55 mm  Hg, assuming right atrial pressure equals 10  mm Hg,  consistent with moderate pulmonary hypertension.  8. Agitated saline injection resulted in a large amount of  bubbles seen in the right heart. Although the exact amount  of cardiac cycles that occurred prior to the crossing of  the bubbles is unclear, the findings are suggestive of a  significant shunt. Consider MARGIE for further workup, if  clinically warrnated.  ------------------------------------------------------------------------  Confirmed on  10/18/2017 - 17:36:22 by Maureen Nixon M.D. [facility]  ------------------------------------------------------------------------    < end of copied text >    RADIOLOGY & ADDITIONAL TESTS:

## 2018-02-18 NOTE — PROGRESS NOTE ADULT - PROBLEM SELECTOR PLAN 3
no real treatment: supportive treatment:  2/17 keep O2 sat greater than 90%  2/18 keep O2 sat greater than 90%

## 2018-02-18 NOTE — PROGRESS NOTE ADULT - PROBLEM SELECTOR PLAN 1
Maintenance HD schedule MWF.  hypervolemic, aranged for PUF today for 2 hrs, UF 1-1.5kg, and HD tomorrow w/2k bath, uf 1.5-2kg as tolearted  titrate up pressors as needed as per CCU  Electrolytes acceptable. chronic volume overload 2/2 sev CMP  Continue midodrine 10mg tid  c/w renal diet, fluid restrictions

## 2018-02-18 NOTE — PROGRESS NOTE ADULT - PROBLEM SELECTOR PLAN 2
Chronic hypotension, bp improved on pressors  Continue midodrine, dobutamine and levophed per CCU  Low BP limiting target UF goal.

## 2018-02-18 NOTE — PROGRESS NOTE ADULT - PROBLEM SELECTOR PLAN 9
BP: 62/37 (17 Feb 2018 10:30) (62/37 - 97/48).  Pt was alert. Management defer to primary team  2/18 Inotropy per cardiology/CCU team.

## 2018-02-18 NOTE — PROGRESS NOTE ADULT - SUBJECTIVE AND OBJECTIVE BOX
Patient is a 62y old  Male who presents with a chief complaint of SOB x few hours (13 Feb 2018 22:11)  Pertinent ROS: Pt was transferred to CCU yesterday for persistent hypotension.   Awake, alert, denies sob, sputum. Pt is on venti mask and pressor.     MEDICATIONS  (STANDING):  ALBUTerol/ipratropium for Nebulization 3 milliLiter(s) Nebulizer every 4 hours  amiodarone    Tablet 100 milliGRAM(s) Oral daily  aspirin enteric coated 81 milliGRAM(s) Oral daily  atorvastatin 80 milliGRAM(s) Oral at bedtime  buDESOnide   90 MICROgram(s) Inhaler 2 Puff(s) Inhalation two times a day  chlorhexidine 4% Liquid 1 Application(s) Topical daily  dextrose 5%. 1000 milliLiter(s) (50 mL/Hr) IV Continuous <Continuous>  dextrose 5%. 1000 milliLiter(s) (50 mL/Hr) IV Continuous <Continuous>  dextrose 50% Injectable 12.5 Gram(s) IV Push once  dextrose 50% Injectable 25 Gram(s) IV Push once  dextrose 50% Injectable 25 Gram(s) IV Push once  DOBUTamine Infusion 10 MICROgram(s)/kG/Min (22.5 mL/Hr) IV Continuous <Continuous>  docusate sodium 100 milliGRAM(s) Oral two times a day  finasteride 5 milliGRAM(s) Oral daily  influenza   Vaccine 0.5 milliLiter(s) IntraMuscular once  insulin lispro (HumaLOG) corrective regimen sliding scale   SubCutaneous three times a day before meals  insulin lispro (HumaLOG) corrective regimen sliding scale   SubCutaneous at bedtime  levothyroxine 75 MICROGram(s) Oral daily  midodrine 10 milliGRAM(s) Oral three times a day  Nephro-lynda 1 Tablet(s) Oral daily  norepinephrine Infusion 0.1 MICROgram(s)/kG/Min (14.063 mL/Hr) IV Continuous <Continuous>  polyethylene glycol 3350 17 Gram(s) Oral daily  senna 2 Tablet(s) Oral at bedtime  sodium chloride 0.9%. 1000 milliLiter(s) (100 mL/Hr) IV Continuous <Continuous>    MEDICATIONS  (PRN):  dextrose Gel 1 Dose(s) Oral once PRN Blood Glucose LESS THAN 70 milliGRAM(s)/deciLiter  dextrose Gel 1 Dose(s) Oral once PRN Blood Glucose LESS THAN 70 milliGRAM(s)/deciliter  glucagon  Injectable 1 milliGRAM(s) IntraMuscular once PRN Glucose <70 milliGRAM(s)/deciLiter    Vital Signs Last 24 Hrs  T(C): 36.7 (18 Feb 2018 08:00), Max: 36.8 (18 Feb 2018 00:00)  T(F): 98.1 (18 Feb 2018 08:00), Max: 98.2 (18 Feb 2018 00:00)  HR: 79 (18 Feb 2018 09:41) (75 - 98)  BP: 90/57 (18 Feb 2018 09:30) (70/47 - 98/55)  BP(mean): 65 (18 Feb 2018 09:30) (48 - 70)  RR: 24 (18 Feb 2018 09:30) (14 - 24)  SpO2: 96% (18 Feb 2018 09:41) (84% - 100%)    I&O's Summary    17 Feb 2018 07:01  -  18 Feb 2018 07:00  --------------------------------------------------------  IN: 852.5 mL / OUT: 0 mL / NET: 852.5 mL    18 Feb 2018 07:01  -  18 Feb 2018 10:59  --------------------------------------------------------  IN: 32 mL / OUT: 0 mL / NET: 32 mL        Physical Exam:   General: NAD, well developed, well nourished.   Eyes: PERRL, EOM intact; conjunctiva and sclera clear  Head: Normocephalic, atraumatic  ENMT: No nasal discharge, airway clear  Neck: Supple, non tender,  no masses, no JVD  Respiratory: wheezing (+), crackles (+)  Cardiovascular: Regular rate and rhythm, no murmurs.  Gastrointestinal: Soft non-tender non-distended, positive bowel sounds  Extremities: Normal range of motion, no clubbing, cyanosis or edema  Vascular: Peripheral pulses palpable 2+ bilaterally  Neurological: Alert and oriented x3, follows commands, answers questions appropriately.   Skin: Warm and dry. No obvious rash  Lymph Nodes: No acute cervical or supraclavicular adenopathy  Psychiatric: Cooperative and appropriate mood  Labs:                   11.0   4.92  )-----------( 85       ( 18 Feb 2018 04:10 )             34.7     02-18    130<L>  |  91<L>  |  25<H>  ----------------------------<  147<H>  3.8   |  25  |  5.71<H>    Ca    7.9<L>      18 Feb 2018 04:10  Phos  3.6     02-18  Mg     1.9     02-18    TPro  7.2  /  Alb  2.5<L>  /  TBili  0.7  /  DBili  x   /  AST  78<H>  /  ALT  49<H>  /  AlkPhos  186<H>  02-18    CAPILLARY BLOOD GLUCOSE      POCT Blood Glucose.: 127 mg/dL (18 Feb 2018 08:21)  POCT Blood Glucose.: 129 mg/dL (17 Feb 2018 22:09)  POCT Blood Glucose.: 86 mg/dL (17 Feb 2018 17:59)    LIVER FUNCTIONS - ( 18 Feb 2018 04:10 )  Alb: 2.5 g/dL / Pro: 7.2 g/dL / ALK PHOS: 186 u/L / ALT: 49 u/L / AST: 78 u/L / GGT: x           PT/INR - ( 18 Feb 2018 04:10 )   PT: 43.5 SEC;   INR: 3.81          PTT - ( 18 Feb 2018 04:10 )  PTT:60.6 SEC    D DImer  Cultures:     Rapid Respiratory Viral Panel Result        02-13 @ 18:40  Rapid RVP --  Coronovirus --  Adenovirus NOT DETECTED  Bordetella Pertussis NOT DETECTED  Chlamydia Pneumonia NOT DETECTED  Entero/RhinovirusNOT DETECTED  HKU1 Coronovirus --  HMPV Coronovirus NOT DETECTED  Influenza A NOT DETECTED (any subtype)  Influenza AH1 NOT DETECTED  Influenza AH1 2009 NOT DETECTED  Influenza AH3 NOT DETECTED  Influenza B POSITIVE  Mycoplasma Pneumoniae NOT DETECTED This nucleic acid amplification assay was performed using  the [employer]. The following pathogens are tested  for: Adenovirus, Coronavirus 229E, Coronavirus HKU1,  Coronavirus NL63, Coronavirus OC43, Human Metapneumovirus  (HMPV), Rhinovirus/Enterovirus, Influenza A H1, Influenza A  H1 2009 (Pandemic H1 2009), Influenza A H3, Influenza A (Flu  A) subtype not identified, Influenza B, Parainfluenza Virus  (types 1, 2, 3, 4), Respiratory Syncytial Virus (RSV),  Bordetella pertussis, Chlamydophila pneumoniae, and  Mycoplasma pneumoniae. A negative FilmArray result does not  always exclude the possibility of viral or bacterial  infection. Laboratory results should always be interpreted  in the context of clinical findings.  NL63 Coronovirus --  OC43 Coronovirus --  Parainfluenza 1 NOT DETECTED  Parainfluenza 2 NOT DETECTED  Parainfluenza 3 NOT DETECTED  Parainfluenza 4 NOT DETECTED  Resp Syncytial Virus NOT DETECTED        Studies  Chest X-RAY: pending official report  CT SCAN Chest  < from: CT Chest No Cont (06.05.17 @ 15:45) >  EXAM:  CT CHEST        PROCEDURE DATE:  Jun 5 2017         INTERPRETATION:  CLINICAL INFORMATION: End-stage renal disease and   congestive heart failure. Rule out pneumonia.    PROCEDURE:   CT of the Chest was performed without intravenous contrast.   Intravenous contrast: None.    Reconstructions were performed in axial thin, axial maximum intensity   projection, sagittal and coronal planes.    COMPARISON: Chest x-ray 6/2/2017.    FINDINGS:    A right IJ hemodialysis catheter terminates at the SVC/right atrial   junction.    LUNGS AND LARGE AIRWAYS: Small amount of secretions in the trachea.   Diffuse bilateral groundglass opacities with traction bronchiectasis   throughout the lungs with regions of polygonal sparing bilateral lower   andleft upper lobes. There is honeycombing scattered throughout the   lungs. Interlobular septal thickening is noted in the bilateral upper   lobes. Patchy tree-in-bud opacities are noted in the bilateral lower   lobes. Scattered small calcified granulomas.  PLEURA: Trace bilateral pleural effusions.  VESSELS: Atherosclerotic calcifications of the aorta and coronary   arteries.  HEART: Cardiomegaly. No pericardial effusion.  MEDIASTINUM AND ASH: No lymphadenopathy.  CHEST WALL AND LOWER NECK: Left chest wall AICD. Generalized anasarca.  UPPER ABDOMEN: Hyperdense material within the gallbladder may reflect   vicarious excretion of iodinated contrast from recent procedure.  BONES: Degenerative changes of the spine.    IMPRESSION:    Extensive pulmonary fibrosis.    Evidence of superimposed mild small airway disease.    < end of copied text >    CT Abdomen  Venous Dopplers: LE: < from: [facility] Duplex Ext Veins Upper Comp, Left. (10.31.17 @ 07:26) >    Patient name: Patrice Plascencia  Date of test: 10/31/2017  MR#: [id number]  [facility] #: [id number]    [facility]ician[facility]s): , Dinora Osborne MD  Interpreted by: Maurilio Bueno MD, [facility], [facility], [facility]  Tech: [facility]  Type of Test: Upper Extremity Venous  ------------------------------------------------------------------------  Procedure: Real-time grayscale and color Duplex  ultrasonography was used to interrogate the deep veins of  the left upper extremity.  Indications: Localized swelling, mass and lump, left upper  limb (R22.32)  ------------------------------------------------------------------------  RESULT:  ------------------------------------------------------------------------  LEFT:  Deep venous thrombosis: No  Superficial venous thrombosis: No  ------------------------------------------------------------------------  Left Findings: The left internal jugular, innominate,  subclavian, axillary, brachial, radial, and ulnar veins  are patent, and demonstrate full compressibility with  phasic Doppler waveforms.  No evidence of thrombosis.  The left basilic and cephalic veins appear sonographically  normal and compressible without evidence of thrombosis.  ------------------------------------------------------------------------  Summary/Impressions:  No evidence of deep venous thrombosis in the left upper  extremity.  ------------------------------------------------------------------------  Confirmed on  10/31/2017 - 9:23 AM by Maurilio Bueno MD,  [facility], [facility], [facility]  By signing this report, the attending physician certifies  that he or she has personally supervised and interpreted  the vascular study and has reviewed and or edited and  agrees with the written comments contained within the  report.    < end of copied text >    Others  < from: Transthoracic Echocardiogram (10.18.17 @ 09:48) >    Patient name: Patrice Plascencia  YOB: 1955   Age: 62 (M)   MR#: [id number]  Study Date: 10/18/2017  [address]: Mery Garcia  Study quality: Technically good  Referring Physician: Channing Elena MD  Blood Pressure: 81/46 mmHg  Height: 180 cm  Weight: 79 kg  BSA: 2 m2  ------------------------------------------------------------------------  PROCEDURE: Transthoracic echocardiogram with 2-D, M-Mode  and complete spectral and color flow Doppler.  Patient was injected with 10 cc's of aerosolized saline.  INDICATION: Unspecified atrial fibrillation (I48.91),  Unspecified atrial flutter (I48.92)  ------------------------------------------------------------------------  DIMENSIONS:  Dimensions:     Normal Values:  LA:     5.4 cm    2.0 - 4.0 cm  Ao:     2.4 cm    2.0 - 3.8 cm  SEPTUM: 0.7 cm    0.6 - 1.2 cm  PWT:    1.2 cm    0.6 - 1.1 cm  LVIDd:  6.2 cm    3.0 - 5.6 cm  LVIDs:  5.6 cm    1.8 - 4.0 cm  Derived Variables:  LVMI: 123 g/m2  RWT: 0.38  Fractional short: 10 %  Ejection Fraction (Teicholtz): 21 %  ------------------------------------------------------------------------  OBSERVATIONS:  Mitral Valve: Normal mitral valve. Mild mitral  regurgitation.  Aortic Root: Normal aortic root.  Aortic Valve: Normal trileaflet aortic valve.  Left Atrium: Severely dilated left atrium.  LA volume index  = 56 cc/m2.  Left Ventricle: Severe global left ventricular systolic  dysfunction. Flattening of the interventricular septum in  both systole and diastole is  consistent with right  ventricular pressure overload. Moderate left ventricular  enlargement.  Right Heart: Moderate right atrial enlargement. Right  ventricular enlargement with decreased right ventricular  systolic function. A device wire is noted in the right  heart. Normal tricuspid valve.  Moderate tricuspid  regurgitation. Normal pulmonic valve. Mild pulmonic  regurgitation.  Pericardium/PleuraNormal pericardium with trace pericardial  effusion.  Hemodynamic: Estimated right ventricular systolic pressure  equals 55 mm Hg, assuming right atrial pressure equals 10  mm Hg, consistent with moderate pulmonary hypertension.  Agitated saline injection resulted in a large amount of  bubbles seen in the right heart. Although the exact amount  of cardiac cycles that occurred prior to the crossing of  the bubbles is unclear, the findings are suggestive of a  significant shunt. Consider MARGIE for further workup, if  clinically warrnated.  ------------------------------------------------------------------------  CONCLUSIONS:  1. Normal trileaflet aortic valve.  2. Severely dilated left atrium.  LA volume index = 56  cc/m2.  3. Moderate left ventricular enlargement.  4. Severe global left ventricular systolic dysfunction.  Flattening of the interventricular septum in both systole  and diastole is  consistent with right ventricular pressure  overload.  5. Right ventricular enlargement with decreased right  ventricular systolic function. A device wire is noted in  the right heart.  6. Normal tricuspid valve.  Moderate tricuspid  regurgitation.  7. Normal pulmonic valve. Mild pulmonic regurgitation.  Estimated pulmonary artery systolic pressure equals 55 mm  Hg, assuming right atrial pressure equals 10  mm Hg,  consistent with moderate pulmonary hypertension.  8. Agitated saline injection resulted in a large amount of  bubbles seen in the right heart. Although the exact amount  of cardiac cycles that occurred prior to the crossing of  the bubbles is unclear, the findings are suggestive of a  significant shunt. Consider MARGIE for further workup, if  clinically warrnated.  ----------------------------------------------    < end of copied text >

## 2018-02-19 LAB
ALBUMIN SERPL ELPH-MCNC: 1.9 G/DL — LOW (ref 3.3–5)
ALBUMIN SERPL ELPH-MCNC: 2.7 G/DL — LOW (ref 3.3–5)
ALP SERPL-CCNC: 137 U/L — HIGH (ref 40–120)
ALP SERPL-CCNC: 183 U/L — HIGH (ref 40–120)
ALT FLD-CCNC: 36 U/L — SIGNIFICANT CHANGE UP (ref 4–41)
ALT FLD-CCNC: 51 U/L — HIGH (ref 4–41)
APTT BLD: 82 SEC — HIGH (ref 27.5–37.4)
AST SERPL-CCNC: 64 U/L — HIGH (ref 4–40)
AST SERPL-CCNC: 87 U/L — HIGH (ref 4–40)
BILIRUB SERPL-MCNC: 0.7 MG/DL — SIGNIFICANT CHANGE UP (ref 0.2–1.2)
BILIRUB SERPL-MCNC: 1 MG/DL — SIGNIFICANT CHANGE UP (ref 0.2–1.2)
BUN SERPL-MCNC: 25 MG/DL — HIGH (ref 7–23)
BUN SERPL-MCNC: 30 MG/DL — HIGH (ref 7–23)
BUN SERPL-MCNC: 34 MG/DL — HIGH (ref 7–23)
CA-I BLD-SCNC: 0.79 MMOL/L — CRITICAL LOW (ref 1.03–1.23)
CA-I BLD-SCNC: 1.03 MMOL/L — SIGNIFICANT CHANGE UP (ref 1.03–1.23)
CALCIUM SERPL-MCNC: 5.9 MG/DL — CRITICAL LOW (ref 8.4–10.5)
CALCIUM SERPL-MCNC: 8.2 MG/DL — LOW (ref 8.4–10.5)
CALCIUM SERPL-MCNC: 8.4 MG/DL — SIGNIFICANT CHANGE UP (ref 8.4–10.5)
CHLORIDE SERPL-SCNC: 102 MMOL/L — SIGNIFICANT CHANGE UP (ref 98–107)
CHLORIDE SERPL-SCNC: 89 MMOL/L — LOW (ref 98–107)
CHLORIDE SERPL-SCNC: 90 MMOL/L — LOW (ref 98–107)
CO2 SERPL-SCNC: 17 MMOL/L — LOW (ref 22–31)
CO2 SERPL-SCNC: 22 MMOL/L — SIGNIFICANT CHANGE UP (ref 22–31)
CO2 SERPL-SCNC: 23 MMOL/L — SIGNIFICANT CHANGE UP (ref 22–31)
CREAT SERPL-MCNC: 5.15 MG/DL — HIGH (ref 0.5–1.3)
CREAT SERPL-MCNC: 7.03 MG/DL — HIGH (ref 0.5–1.3)
CREAT SERPL-MCNC: 7.09 MG/DL — HIGH (ref 0.5–1.3)
GLUCOSE SERPL-MCNC: 104 MG/DL — HIGH (ref 70–99)
GLUCOSE SERPL-MCNC: 88 MG/DL — SIGNIFICANT CHANGE UP (ref 70–99)
GLUCOSE SERPL-MCNC: 90 MG/DL — SIGNIFICANT CHANGE UP (ref 70–99)
HCT VFR BLD CALC: 35.2 % — LOW (ref 39–50)
HGB BLD-MCNC: 11.1 G/DL — LOW (ref 13–17)
INR BLD: 3.28 — HIGH (ref 0.88–1.17)
MAGNESIUM SERPL-MCNC: 1.6 MG/DL — SIGNIFICANT CHANGE UP (ref 1.6–2.6)
MAGNESIUM SERPL-MCNC: 2 MG/DL — SIGNIFICANT CHANGE UP (ref 1.6–2.6)
MAGNESIUM SERPL-MCNC: 2.1 MG/DL — SIGNIFICANT CHANGE UP (ref 1.6–2.6)
MCHC RBC-ENTMCNC: 30.2 PG — SIGNIFICANT CHANGE UP (ref 27–34)
MCHC RBC-ENTMCNC: 31.5 % — LOW (ref 32–36)
MCV RBC AUTO: 95.7 FL — SIGNIFICANT CHANGE UP (ref 80–100)
NRBC # FLD: 0 — SIGNIFICANT CHANGE UP
PHOSPHATE SERPL-MCNC: 3.3 MG/DL — SIGNIFICANT CHANGE UP (ref 2.5–4.5)
PHOSPHATE SERPL-MCNC: 4.8 MG/DL — HIGH (ref 2.5–4.5)
PLATELET # BLD AUTO: 106 K/UL — LOW (ref 150–400)
PMV BLD: 13.4 FL — HIGH (ref 7–13)
POTASSIUM SERPL-MCNC: 3 MMOL/L — LOW (ref 3.5–5.3)
POTASSIUM SERPL-MCNC: 4.1 MMOL/L — SIGNIFICANT CHANGE UP (ref 3.5–5.3)
POTASSIUM SERPL-MCNC: 4.1 MMOL/L — SIGNIFICANT CHANGE UP (ref 3.5–5.3)
POTASSIUM SERPL-SCNC: 3 MMOL/L — LOW (ref 3.5–5.3)
POTASSIUM SERPL-SCNC: 4.1 MMOL/L — SIGNIFICANT CHANGE UP (ref 3.5–5.3)
POTASSIUM SERPL-SCNC: 4.1 MMOL/L — SIGNIFICANT CHANGE UP (ref 3.5–5.3)
PROT SERPL-MCNC: 5.6 G/DL — LOW (ref 6–8.3)
PROT SERPL-MCNC: 7.5 G/DL — SIGNIFICANT CHANGE UP (ref 6–8.3)
PROTHROM AB SERPL-ACNC: 38.7 SEC — HIGH (ref 9.8–13.1)
RBC # BLD: 3.68 M/UL — LOW (ref 4.2–5.8)
RBC # FLD: 16.2 % — HIGH (ref 10.3–14.5)
SODIUM SERPL-SCNC: 129 MMOL/L — LOW (ref 135–145)
SODIUM SERPL-SCNC: 130 MMOL/L — LOW (ref 135–145)
SODIUM SERPL-SCNC: 135 MMOL/L — SIGNIFICANT CHANGE UP (ref 135–145)
WBC # BLD: 5.4 K/UL — SIGNIFICANT CHANGE UP (ref 3.8–10.5)
WBC # FLD AUTO: 5.4 K/UL — SIGNIFICANT CHANGE UP (ref 3.8–10.5)

## 2018-02-19 PROCEDURE — 93010 ELECTROCARDIOGRAM REPORT: CPT

## 2018-02-19 RX ORDER — CALCIUM GLUCONATE 100 MG/ML
1 VIAL (ML) INTRAVENOUS ONCE
Qty: 0 | Refills: 0 | Status: DISCONTINUED | OUTPATIENT
Start: 2018-02-19 | End: 2018-02-19

## 2018-02-19 RX ORDER — ONDANSETRON 8 MG/1
4 TABLET, FILM COATED ORAL ONCE
Qty: 0 | Refills: 0 | Status: COMPLETED | OUTPATIENT
Start: 2018-02-19 | End: 2018-02-20

## 2018-02-19 RX ORDER — BENZOCAINE AND MENTHOL 5; 1 G/100ML; G/100ML
1 LIQUID ORAL EVERY 6 HOURS
Qty: 0 | Refills: 0 | Status: DISCONTINUED | OUTPATIENT
Start: 2018-02-19 | End: 2018-03-23

## 2018-02-19 RX ADMIN — MIDODRINE HYDROCHLORIDE 30 MILLIGRAM(S): 2.5 TABLET ORAL at 06:02

## 2018-02-19 RX ADMIN — Medication 3 MILLILITER(S): at 12:43

## 2018-02-19 RX ADMIN — Medication 3 MILLILITER(S): at 04:23

## 2018-02-19 RX ADMIN — Medication 3 MILLILITER(S): at 00:00

## 2018-02-19 RX ADMIN — Medication 1: at 12:59

## 2018-02-19 RX ADMIN — Medication 81 MILLIGRAM(S): at 12:15

## 2018-02-19 RX ADMIN — Medication 3 MILLILITER(S): at 21:08

## 2018-02-19 RX ADMIN — Medication 22.5 MICROGRAM(S)/KG/MIN: at 22:05

## 2018-02-19 RX ADMIN — Medication 22.5 MICROGRAM(S)/KG/MIN: at 12:16

## 2018-02-19 RX ADMIN — Medication 100 MILLIGRAM(S): at 18:12

## 2018-02-19 RX ADMIN — Medication 75 MICROGRAM(S): at 06:02

## 2018-02-19 RX ADMIN — Medication 2 PUFF(S): at 21:08

## 2018-02-19 RX ADMIN — Medication 14.06 MICROGRAM(S)/KG/MIN: at 07:43

## 2018-02-19 RX ADMIN — AMIODARONE HYDROCHLORIDE 100 MILLIGRAM(S): 400 TABLET ORAL at 06:02

## 2018-02-19 RX ADMIN — POLYETHYLENE GLYCOL 3350 17 GRAM(S): 17 POWDER, FOR SOLUTION ORAL at 12:25

## 2018-02-19 RX ADMIN — MIDODRINE HYDROCHLORIDE 30 MILLIGRAM(S): 2.5 TABLET ORAL at 14:20

## 2018-02-19 RX ADMIN — MIDODRINE HYDROCHLORIDE 30 MILLIGRAM(S): 2.5 TABLET ORAL at 22:04

## 2018-02-19 RX ADMIN — BENZOCAINE AND MENTHOL 1 LOZENGE: 5; 1 LIQUID ORAL at 18:12

## 2018-02-19 RX ADMIN — Medication 3 MILLILITER(S): at 08:50

## 2018-02-19 RX ADMIN — Medication 2 PUFF(S): at 08:54

## 2018-02-19 RX ADMIN — FINASTERIDE 5 MILLIGRAM(S): 5 TABLET, FILM COATED ORAL at 12:15

## 2018-02-19 RX ADMIN — SENNA PLUS 2 TABLET(S): 8.6 TABLET ORAL at 22:04

## 2018-02-19 RX ADMIN — Medication 14.06 MICROGRAM(S)/KG/MIN: at 22:05

## 2018-02-19 RX ADMIN — ATORVASTATIN CALCIUM 80 MILLIGRAM(S): 80 TABLET, FILM COATED ORAL at 22:04

## 2018-02-19 RX ADMIN — Medication 100 MILLIGRAM(S): at 06:02

## 2018-02-19 RX ADMIN — Medication 3 MILLILITER(S): at 16:48

## 2018-02-19 RX ADMIN — Medication 1 TABLET(S): at 12:25

## 2018-02-19 NOTE — PROGRESS NOTE ADULT - SUBJECTIVE AND OBJECTIVE BOX
Continues to be on dobutamine/norepinephrine for hypotension and on facemask, off BIPAP  He continues to complain of thirst adn a dry throat    MEDICATIONS  (STANDING):  ALBUTerol/ipratropium for Nebulization 3 milliLiter(s) Nebulizer every 4 hours  amiodarone    Tablet 100 milliGRAM(s) Oral daily  aspirin enteric coated 81 milliGRAM(s) Oral daily  atorvastatin 80 milliGRAM(s) Oral at bedtime  buDESOnide   90 MICROgram(s) Inhaler 2 Puff(s) Inhalation two times a day  chlorhexidine 4% Liquid 1 Application(s) Topical daily  dextrose 5%. 1000 milliLiter(s) (50 mL/Hr) IV Continuous <Continuous>  dextrose 5%. 1000 milliLiter(s) (50 mL/Hr) IV Continuous <Continuous>  dextrose 50% Injectable 12.5 Gram(s) IV Push once  dextrose 50% Injectable 25 Gram(s) IV Push once  dextrose 50% Injectable 25 Gram(s) IV Push once  DOBUTamine Infusion 10 MICROgram(s)/kG/Min (22.5 mL/Hr) IV Continuous <Continuous>  docusate sodium 100 milliGRAM(s) Oral two times a day  finasteride 5 milliGRAM(s) Oral daily  influenza   Vaccine 0.5 milliLiter(s) IntraMuscular once  insulin lispro (HumaLOG) corrective regimen sliding scale   SubCutaneous three times a day before meals  insulin lispro (HumaLOG) corrective regimen sliding scale   SubCutaneous at bedtime  levothyroxine 75 MICROGram(s) Oral daily  midodrine 30 milliGRAM(s) Oral three times a day  Nephro-lynda 1 Tablet(s) Oral daily  norepinephrine Infusion 0.1 MICROgram(s)/kG/Min (14.063 mL/Hr) IV Continuous <Continuous>  polyethylene glycol 3350 17 Gram(s) Oral daily  senna 2 Tablet(s) Oral at bedtime  sodium chloride 0.9%. 1000 milliLiter(s) (100 mL/Hr) IV Continuous <Continuous>    MEDICATIONS  (PRN):  dextrose Gel 1 Dose(s) Oral once PRN Blood Glucose LESS THAN 70 milliGRAM(s)/deciLiter  dextrose Gel 1 Dose(s) Oral once PRN Blood Glucose LESS THAN 70 milliGRAM(s)/deciliter  glucagon  Injectable 1 milliGRAM(s) IntraMuscular once PRN Glucose <70 milliGRAM(s)/deciLiter  ondansetron Injectable 4 milliGRAM(s) IV Push once PRN Nausea and/or Vomiting    T(F): 97 (02-19-18 @ 07:44), Max: 97.5 (02-18-18 @ 16:00)  HR: 92 (02-19-18 @ 08:00) (78 - 100)  BP: 84/54 (02-19-18 @ 08:00) (73/45 - 96/62)  RR: 19 (02-19-18 @ 08:00) (11 - 26)  SpO2: 98% (02-19-18 @ 08:00) (84% - 100%)  Wt(kg): --  ,   I&O's Summary    18 Feb 2018 07:01  -  19 Feb 2018 07:00  --------------------------------------------------------  IN: 1243.2 mL / OUT: 0 mL / NET: 1243.2 mL          Lungs bilat rhonchi and crackles  Irregular rhythm 1-2/6 systolic murmur  No edema                          11.1   5.40  )-----------( 106      ( 19 Feb 2018 05:40 )             35.2               02-19    130<L>  |  90<L>  |  34<H>  ----------------------------<  90  4.1   |  23  |  7.09<H>    Ca    8.2<L>      19 Feb 2018 05:40  Phos  4.8     02-19  Mg     2.0     02-19    TPro  7.5  /  Alb  2.7<L>  /  TBili  1.0  /  DBili  x   /  AST  87<H>  /  ALT  51<H>  /  AlkPhos  183<H>  02-19    PT/INR - ( 19 Feb 2018 05:40 )   PT: 38.7 SEC;   INR: 3.28          PTT - ( 19 Feb 2018 05:40 )  PTT:82.0 SEC

## 2018-02-19 NOTE — PROGRESS NOTE ADULT - PROBLEM SELECTOR PLAN 3
.  - c/w amiodarone 100mg Qday  - Holding Coumadin in setting of supratherapeutic INR.   - Daily INR, redose Coumadin when therapeutic.

## 2018-02-19 NOTE — PROGRESS NOTE ADULT - PROBLEM SELECTOR PLAN 1
has underlying pulmonary fibrosis and respiratory failure precipitated by influenza: Did use bipap last night: Try to wean him off bipap: If necessary , use high flow oxygen  2/17 weaned off to Ventimask. Biapap and high flow as needed. will follow up CXR  2/18 wean off Ventimask to keep O2 sat greater than 90%  2/19: cont bipap prn : try to wean  him off bipap: if necessary, can use high flow oxygen

## 2018-02-19 NOTE — PROGRESS NOTE ADULT - ASSESSMENT
62y Male with ESRD on HD MWF, NICM with severe LV dysfnxn (EF 21%), s/p biotronic ICD, on home dobutamine drip, Afib on coumadin, COPD, pulm fibrosis on 3-4L home O2, DM, and chronic hypotension on midodrine admitted with SOB 2/2 pulm edema and Flu.     labs, chart reviewed  CXR images reviewed w/CCU resident

## 2018-02-19 NOTE — PROGRESS NOTE ADULT - SUBJECTIVE AND OBJECTIVE BOX
Patient is a 62y old  Male who presents with a chief complaint of SOB x few hours (13 Feb 2018 22:11)      Any change in ROS: remains  hypotensive on pressors:  uses bipap prn and at night  \feels SOB   Has pain in throat    MEDICATIONS  (STANDING):  ALBUTerol/ipratropium for Nebulization 3 milliLiter(s) Nebulizer every 4 hours  amiodarone    Tablet 100 milliGRAM(s) Oral daily  aspirin enteric coated 81 milliGRAM(s) Oral daily  atorvastatin 80 milliGRAM(s) Oral at bedtime  buDESOnide   90 MICROgram(s) Inhaler 2 Puff(s) Inhalation two times a day  chlorhexidine 4% Liquid 1 Application(s) Topical daily  dextrose 5%. 1000 milliLiter(s) (50 mL/Hr) IV Continuous <Continuous>  dextrose 5%. 1000 milliLiter(s) (50 mL/Hr) IV Continuous <Continuous>  dextrose 50% Injectable 12.5 Gram(s) IV Push once  dextrose 50% Injectable 25 Gram(s) IV Push once  dextrose 50% Injectable 25 Gram(s) IV Push once  DOBUTamine Infusion 10 MICROgram(s)/kG/Min (22.5 mL/Hr) IV Continuous <Continuous>  docusate sodium 100 milliGRAM(s) Oral two times a day  finasteride 5 milliGRAM(s) Oral daily  influenza   Vaccine 0.5 milliLiter(s) IntraMuscular once  insulin lispro (HumaLOG) corrective regimen sliding scale   SubCutaneous three times a day before meals  insulin lispro (HumaLOG) corrective regimen sliding scale   SubCutaneous at bedtime  levothyroxine 75 MICROGram(s) Oral daily  midodrine 30 milliGRAM(s) Oral three times a day  Nephro-lynda 1 Tablet(s) Oral daily  norepinephrine Infusion 0.1 MICROgram(s)/kG/Min (14.063 mL/Hr) IV Continuous <Continuous>  polyethylene glycol 3350 17 Gram(s) Oral daily  senna 2 Tablet(s) Oral at bedtime  sodium chloride 0.9%. 1000 milliLiter(s) (100 mL/Hr) IV Continuous <Continuous>    MEDICATIONS  (PRN):  dextrose Gel 1 Dose(s) Oral once PRN Blood Glucose LESS THAN 70 milliGRAM(s)/deciLiter  dextrose Gel 1 Dose(s) Oral once PRN Blood Glucose LESS THAN 70 milliGRAM(s)/deciliter  glucagon  Injectable 1 milliGRAM(s) IntraMuscular once PRN Glucose <70 milliGRAM(s)/deciLiter  ondansetron Injectable 4 milliGRAM(s) IV Push once PRN Nausea and/or Vomiting    Vital Signs Last 24 Hrs  T(C): 36.1 (19 Feb 2018 07:44), Max: 36.4 (18 Feb 2018 16:00)  T(F): 97 (19 Feb 2018 07:44), Max: 97.5 (18 Feb 2018 16:00)  HR: 89 (19 Feb 2018 10:00) (76 - 100)  BP: 83/50 (19 Feb 2018 10:00) (80/51 - 96/62)  BP(mean): 57 (19 Feb 2018 10:00) (57 - 72)  RR: 22 (19 Feb 2018 10:00) (11 - 26)  SpO2: 96% (19 Feb 2018 10:00) (92% - 100%)    I&O's Summary    18 Feb 2018 07:01  -  19 Feb 2018 07:00  --------------------------------------------------------  IN: 1243.2 mL / OUT: 0 mL / NET: 1243.2 mL    19 Feb 2018 07:01  -  19 Feb 2018 10:56  --------------------------------------------------------  IN: 93 mL / OUT: 0 mL / NET: 93 mL          Physical Exam:   GENERAL: on 50%  HEENT: BELL/   Atraumatic, Normocephalic  ENMT: No tonsillar erythema, exudates, or enlargement; Moist mucous membranes, Good dentition, No lesions  NECK: Supple, No JVD, Normal thyroid  CHEST/LUNG: bibasilar crackles   CVS: Regular rate and rhythm; No murmurs, rubs, or gallops  GI: : Soft, Nontender, Nondistended; Bowel sounds present  NERVOUS SYSTEM:  Alert & Oriented X3  EXTREMITIES:  mild edema  LYMPH: No lymphadenopathy noted  SKIN: No rashes or lesions  ENDOCRINOLOGY: No Thyromegaly  PSYCH: Appropriate    Labs:  26                            11.1   5.40  )-----------( 106      ( 19 Feb 2018 05:40 )             35.2                         11.0   4.92  )-----------( 85       ( 18 Feb 2018 04:10 )             34.7                         11.1   4.97  )-----------( 80       ( 17 Feb 2018 05:20 )             35.5                         10.8   3.60  )-----------( 89       ( 16 Feb 2018 07:00 )             34.8     02-19    130<L>  |  90<L>  |  34<H>  ----------------------------<  90  4.1   |  23  |  7.09<H>  02-19    129<L>  |  89<L>  |  30<H>  ----------------------------<  104<H>  4.1   |  22  |  7.03<H>  02-18    135  |  102  |  25<H>  ----------------------------<  88  3.0<L>   |  17<L>  |  5.15<H>  02-18    130<L>  |  91<L>  |  25<H>  ----------------------------<  147<H>  3.8   |  25  |  5.71<H>  02-17    130<L>  |  91<L>  |  18  ----------------------------<  126<H>  3.7   |  24  |  4.54<H>  02-16    131<L>  |  92<L>  |  37<H>  ----------------------------<  116<H>  3.9   |  24  |  6.56<H>    Ca    8.2<L>      19 Feb 2018 05:40  Ca    8.4      19 Feb 2018 01:11  Ca    5.9<LL>      18 Feb 2018 23:40  Ca    7.9<L>      18 Feb 2018 04:10  Phos  4.8     02-19  Phos  3.3     02-18  Phos  3.6     02-18  Mg     2.0     02-19  Mg     2.1     02-19  Mg     1.6     02-18  Mg     1.9     02-18    TPro  7.5  /  Alb  2.7<L>  /  TBili  1.0  /  DBili  x   /  AST  87<H>  /  ALT  51<H>  /  AlkPhos  183<H>  02-19  TPro  5.6<L>  /  Alb  1.9<L>  /  TBili  0.7  /  DBili  x   /  AST  64<H>  /  ALT  36  /  AlkPhos  137<H>  02-18  TPro  7.2  /  Alb  2.5<L>  /  TBili  0.7  /  DBili  x   /  AST  78<H>  /  ALT  49<H>  /  AlkPhos  186<H>  02-18  TPro  7.4  /  Alb  2.6<L>  /  TBili  0.7  /  DBili  0.3<H>  /  AST  97<H>  /  ALT  50<H>  /  AlkPhos  184<H>  02-17  TPro  7.1  /  Alb  2.5<L>  /  TBili  0.6  /  DBili  x   /  AST  78<H>  /  ALT  44<H>  /  AlkPhos  185<H>  02-16    CAPILLARY BLOOD GLUCOSE      POCT Blood Glucose.: 100 mg/dL (19 Feb 2018 08:15)  POCT Blood Glucose.: 106 mg/dL (18 Feb 2018 22:25)  POCT Blood Glucose.: 80 mg/dL (18 Feb 2018 18:25)  POCT Blood Glucose.: 176 mg/dL (18 Feb 2018 12:47)      LIVER FUNCTIONS - ( 19 Feb 2018 05:40 )  Alb: 2.7 g/dL / Pro: 7.5 g/dL / ALK PHOS: 183 u/L / ALT: 51 u/L / AST: 87 u/L / GGT: x           PT/INR - ( 19 Feb 2018 05:40 )   PT: 38.7 SEC;   INR: 3.28          PTT - ( 19 Feb 2018 05:40 )  PTT:82.0 SEC    Lactate, Blood: 1.5 mmol/L (02-17 @ 11:10)  Lactate, Blood: 1.1 mmol/L (02-16 @ 07:00)  Cultures:               < from: Xray Chest 1 View- PORTABLE-Urgent (02.17.18 @ 18:38) >    EXAM:  XR CHEST PORTABLE URGENT 1V        PROCEDURE DATE:  Feb 17 2018         INTERPRETATION:  CLINICAL INDICATION: assess PICC line placement    EXAM:  Portable upright AP chest from 2/17/2018 at 1838. Compared to prior study   from 2/17/2018.    IMPRESSION:  Redemonstrated left PICC with tip in SVC region. No kinking or   discontinuity along visualized course.    Remainder of the exam is unchanged.                  KRISTEN HARRIS M.D., ATTENDING RADIOLOGIST  This document has been electronically signed. Feb 18 2018  4:34PM        < end of copied text >          Rapid Respiratory Viral Panel Result        02-13 @ 18:40  Rapid RVP --  Coronovirus --  Adenovirus NOT DETECTED  Bordetella Pertussis NOT DETECTED  Chlamydia Pneumonia NOT DETECTED  Entero/RhinovirusNOT DETECTED  HKU1 Coronovirus --  HMPV Coronovirus NOT DETECTED  Influenza A NOT DETECTED (any subtype)  Influenza AH1 NOT DETECTED  Influenza AH1 2009 NOT DETECTED  Influenza AH3 NOT DETECTED  Influenza B POSITIVE  Mycoplasma Pneumoniae NOT DETECTED This nucleic acid amplification assay was performed using  the Light ExtractionArray. The following pathogens are tested  for: Adenovirus, Coronavirus 229E, Coronavirus HKU1,  Coronavirus NL63, Coronavirus OC43, Human Metapneumovirus  (HMPV), Rhinovirus/Enterovirus, Influenza A H1, Influenza A  H1 2009 (Pandemic H1 2009), Influenza A H3, Influenza A (Flu  A) subtype not identified, Influenza B, Parainfluenza Virus  (types 1, 2, 3, 4), Respiratory Syncytial Virus (RSV),  Bordetella pertussis, Chlamydophila pneumoniae, and  Mycoplasma pneumoniae. A negative FilmArray result does not  always exclude the possibility of viral or bacterial  infection. Laboratory results should always be interpreted  in the context of clinical findings.  NL63 Coronovirus --  OC43 Coronovirus --  Parainfluenza 1 NOT DETECTED  Parainfluenza 2 NOT DETECTED  Parainfluenza 3 NOT DETECTED  Parainfluenza 4 NOT DETECTED  Resp Syncytial Virus NOT DETECTED        < from: CT Abdomen and Pelvis No Cont (10.30.17 @ 14:37) >    FINDINGS:    LOWER CHEST: Bilateral lower lung traction bronchiectasis and groundglass   opacities secondary to interstitial lung disease. Bilateral pleural   thickening with calcifications. Cardiomegaly. Small pericardial effusion.   An 8 mm nodular opacity at the right lung base is stable. AICD.    LIVER: Within normal limits.  BILE DUCTS: Normal caliber.  GALLBLADDER: High density layering material within the gallbladder may be   sludge and gallstones.  SPLEEN: Within normal limits.  PANCREAS: Within normal limits.  ADRENALS: Within normal limits.  KIDNEYS/URETERS: Atrophic bilateral kidneys. Subcentimeter hypodense   lesions in bilateral kidneys too small to characterize.    BLADDER: Within normal limits.  REPRODUCTIVE ORGANS: The prostate is within normal limits.    BOWEL: No bowel obstruction. Appendix is normal.  PERITONEUM: Small ascites.  VESSELS:  Atherosclerotic calcifications of the abdominal aorta.  RETROPERITONEUM: No lymphadenopathy.    ABDOMINAL WALL: Anasarca.  BONES: Within normal limits.    IMPRESSION:    No bowel obstruction.  Interstitial lung disease.  8 mm nodular opacity at the right lung base without change. Consider   continued follow-up.              LIU MUSE M.D., RADIOLOGY RESIDENT  This document has been electronically signed.  TERESSA BAIRD M.D., ATTENDING RADIOLOGIST  This document has been electronically signed.Oct 30 2017  3:29PM    < end of copied text >    Studies  Chest X-RAY  CT SCAN Chest   Venous Dopplers: LE:   CT Abdomen  Others      < from: CT Chest No Cont (06.05.17 @ 15:45) >    LUNGS AND LARGE AIRWAYS: Small amount of secretions in the trachea.   Diffuse bilateral groundglass opacities with traction bronchiectasis   throughout the lungs with regions of polygonal sparing bilateral lower   andleft upper lobes. There is honeycombing scattered throughout the   lungs. Interlobular septal thickening is noted in the bilateral upper   lobes. Patchy tree-in-bud opacities are noted in the bilateral lower   lobes. Scattered small calcified granulomas.  PLEURA: Trace bilateral pleural effusions.  VESSELS: Atherosclerotic calcifications of the aorta and coronary   arteries.  HEART: Cardiomegaly. No pericardial effusion.  MEDIASTINUM AND ASH: No lymphadenopathy.  CHEST WALL AND LOWER NECK: Left chest wall AICD. Generalized anasarca.  UPPER ABDOMEN: Hyperdense material within the gallbladder may reflect   vicarious excretion of iodinated contrast from recent procedure.  BONES: Degenerative changes of the spine.    IMPRESSION:    Extensive pulmonary fibrosis.    Evidence of superimposed mild small airway disease.              LORI ESTRELLA M.D., RADIOLOGY RESIDENT  This document has been electronically signed.  TERESSA BAIRD M.D., ATTENDING RADIOLOGIST  This document has been electronically signed. Jun 5 2017  4:30PM              < end of copied text >

## 2018-02-19 NOTE — PROGRESS NOTE ADULT - SUBJECTIVE AND OBJECTIVE BOX
Pt seen and examined at bedside, in CCU earlier today    Pt c/o SOB, feeling weak.   on VM now. on &off BiPAP yestreday  remains on Dobutamine drip and levophed    Allergies:  No Known Allergies    Hospital Medications:   MEDICATIONS  (STANDING):  ALBUTerol/ipratropium for Nebulization 3 milliLiter(s) Nebulizer every 4 hours  amiodarone    Tablet 100 milliGRAM(s) Oral daily  aspirin enteric coated 81 milliGRAM(s) Oral daily  atorvastatin 80 milliGRAM(s) Oral at bedtime  buDESOnide 160 MICROgram(s)/formoterol 4.5 MICROgram(s) Inhaler 2 Puff(s) Inhalation two times a day  dextrose 5%. 1000 milliLiter(s) (50 mL/Hr) IV Continuous <Continuous>  dextrose 50% Injectable 12.5 Gram(s) IV Push once  dextrose 50% Injectable 25 Gram(s) IV Push once  dextrose 50% Injectable 25 Gram(s) IV Push once  DOBUTamine Infusion 10 MICROgram(s)/kG/Min (22.5 mL/Hr) IV Continuous <Continuous>  docusate sodium 100 milliGRAM(s) Oral two times a day  finasteride 5 milliGRAM(s) Oral daily  levothyroxine 75 MICROGram(s) Oral daily  midodrine 10 milliGRAM(s) Oral three times a day  Nephro-lynda 1 Tablet(s) Oral daily  oseltamivir 30 milliGRAM(s) Oral <User Schedule>  polyethylene glycol 3350 17 Gram(s) Oral daily  senna 2 Tablet(s) Oral at bedtime    VITALS:  Vital Signs Last 24 Hrs  T(C): 36.1 (19 Feb 2018 13:00), Max: 36.4 (18 Feb 2018 16:00)  T(F): 97 (19 Feb 2018 13:00), Max: 97.5 (18 Feb 2018 16:00)  HR: 81 (19 Feb 2018 12:45) (76 - 100)  BP: 90/68 (19 Feb 2018 12:45) (80/51 - 96/62)  BP(mean): 64 (19 Feb 2018 12:00) (57 - 72)  RR: 18 (19 Feb 2018 12:45) (11 - 26)  SpO2: 97% (19 Feb 2018 12:00) (95% - 100%)    I&O's Summary    18 Feb 2018 07:01  -  19 Feb 2018 07:00  --------------------------------------------------------  IN: 1243.2 mL / OUT: 0 mL / NET: 1243.2 mL    19 Feb 2018 07:01  -  19 Feb 2018 13:42  --------------------------------------------------------  IN: 355 mL / OUT: 0 mL / NET: 355 mL      PHYSICAL EXAM:  Constitutional: NAD  HEENT: anicteric sclera, oropharynx clear, MMM  Neck: No JVD  Respiratory: Minimal bibasilar crackles.   Cardiovascular: S1, S2, RRR  Gastrointestinal: BS+, soft, NT/ND  Extremities: No cyanosis or clubbing. No peripheral LE edema  Neurological: A/O x 3, no focal deficits  Psychiatric: Normal mood, normal affect  : No CVA tenderness. No rosa.   Skin: No rashes  Vascular Access: RIJ tunneled cath     LABS:  02-18    130<L>  |  91<L>  |  25<H>  ----------------------------<  147<H>  3.8   |  25  |  5.71<H>    Ca    7.9<L>      18 Feb 2018 04:10  Phos  3.6     02-18  Mg     1.9     02-18    TPro  7.2  /  Alb  2.5<L>  /  TBili  0.7  /  DBili  x   /  AST  78<H>  /  ALT  49<H>  /  AlkPhos  186<H>  02-18                        11.0   4.92  )-----------( 85       ( 18 Feb 2018 04:10 )             34.7       Urine Studies:      RADIOLOGY & ADDITIONAL STUDIES:   Xray Chest 1 View- PORTABLE-Urgent (02.17.18 @ 18:38) >  IMPRESSION:  Redemonstrated left PICC with tip in SVC region. No kinking or   discontinuity along visualized course.    Remainder of the exam is unchanged.

## 2018-02-19 NOTE — PROGRESS NOTE ADULT - SUBJECTIVE AND OBJECTIVE BOX
PATIENT: KURT STRONG   AGE: 61yo   GENDER: Male  ALLERGIES: No Known Allergies    CHIEF COMPLAINT:   SOB x few hours (13 Feb 2018 22:11)    INTERVAL EVENTS:         MEDICATIONS  ALBUTerol/ipratropium for Nebulization 3 milliLiter(s) Nebulizer every 4 hours  amiodarone    Tablet 100 milliGRAM(s) Oral daily  aspirin enteric coated 81 milliGRAM(s) Oral daily  atorvastatin 80 milliGRAM(s) Oral at bedtime  buDESOnide   90 MICROgram(s) Inhaler 2 Puff(s) Inhalation two times a day  chlorhexidine 4% Liquid 1 Application(s) Topical daily  dextrose 5%. 1000 milliLiter(s) (50 mL/Hr) IV Continuous <Continuous>  dextrose 5%. 1000 milliLiter(s) (50 mL/Hr) IV Continuous <Continuous>  dextrose 50% Injectable 12.5 Gram(s) IV Push once  dextrose 50% Injectable 25 Gram(s) IV Push once  dextrose 50% Injectable 25 Gram(s) IV Push once  dextrose Gel 1 Dose(s) Oral once PRN Blood Glucose LESS THAN 70 milliGRAM(s)/deciLiter  dextrose Gel 1 Dose(s) Oral once PRN Blood Glucose LESS THAN 70 milliGRAM(s)/deciliter  DOBUTamine Infusion 10 MICROgram(s)/kG/Min (22.5 mL/Hr) IV Continuous <Continuous>  docusate sodium 100 milliGRAM(s) Oral two times a day  finasteride 5 milliGRAM(s) Oral daily  glucagon  Injectable 1 milliGRAM(s) IntraMuscular once PRN Glucose <70 milliGRAM(s)/deciLiter  influenza   Vaccine 0.5 milliLiter(s) IntraMuscular once  insulin lispro (HumaLOG) corrective regimen sliding scale   SubCutaneous three times a day before meals  insulin lispro (HumaLOG) corrective regimen sliding scale   SubCutaneous at bedtime  levothyroxine 75 MICROGram(s) Oral daily  midodrine 30 milliGRAM(s) Oral three times a day  Nephro-lynda 1 Tablet(s) Oral daily  norepinephrine Infusion 0.1 MICROgram(s)/kG/Min (14.063 mL/Hr) IV Continuous <Continuous>  ondansetron Injectable 4 milliGRAM(s) IV Push once PRN Nausea and/or Vomiting  polyethylene glycol 3350 17 Gram(s) Oral daily  senna 2 Tablet(s) Oral at bedtime  sodium chloride 0.9%. 1000 milliLiter(s) (100 mL/Hr) IV Continuous <Continuous>      VITAL SIGNS (last 24 hrs):  ICU Vital Signs Last 24 Hrs  T(C): 36.1 (19 Feb 2018 07:44), Max: 36.4 (18 Feb 2018 16:00)  T(F): 97 (19 Feb 2018 07:44), Max: 97.5 (18 Feb 2018 16:00)  HR: 82 (19 Feb 2018 07:13) (78 - 100)  BP: 89/65 (19 Feb 2018 06:00) (73/45 - 96/62)  BP(mean): 70 (19 Feb 2018 06:00) (51 - 70)  ABP: --  ABP(mean): --  RR: 18 (19 Feb 2018 06:00) (11 - 26)  SpO2: 100% (19 Feb 2018 07:13) (84% - 100%)      PHYSICAL EXAM:  GENERAL: NAD  HEENT:  AT/NC; EOMI, PERRLA, conjunctiva and sclera clear; hearing grossly intact  NECK: Supple, non tender  CHEST/LUNG: CTAB, no wheezes, ronchi, rales  HEART: Normal rate, regular rhythm; No murmurs, rubs, or gallops; No JVD or peripheral edema  ABDOMEN: soft, NTTP, nondistended, normoactive BS  MSK/EXTREMITIES:  Grossly normal strength, movement, tone, and ROM; Palpable peripheral pulses; No clubbing or cyanosis  PSYCH: AAOx4  NEUROLOGY: Non focal   SKIN: No rashes or lesions      WEIGHT:   Admission Weight (kg): 75.3 (02-18-18)  Daily     I/O SUMMARY:    02-18-18 @ 07:01  -  02-19-18 @ 07:00  --------------------------------------------------------  IN: 1243.2 mL / OUT: 0 mL / NET: 1243.2 mL        LABS:                         11.1   5.40  >-----< 106           ( 02-19-18 @ 05:40 )             35.2       130  |  90  |   34  ----------------------< 90    (02-19-18 @ 05:40)     4.1  |  23  |  7.09    Anion Gap: --  ,   129  |  89  |   30  ----------------------< 104    (02-19-18 @ 01:11)     4.1  |  22  |  7.03    Anion Gap: --  ,   135  |  102  |   25  ----------------------< 88    (02-18-18 @ 23:40)     3.0  |  17  |  5.15    Anion Gap: --    Ca   8.2   (02-19-18 @ 05:40)  Mg   2.0   (02-19-18 @ 05:40)  Phos 4.8   (02-19-18 @ 05:40)       TP 8.2     |  AST 2.7    -------------------------     Alb x     |  ALT x               (02-19-18 @ 05:40)  -------------------------     T-bili 2.7  |  AlkPh 183    D-bili x   COAGULATION STUDIES:     aPTT  82.0 SEC    (02-19-18 @ 05:40)     PT     38.7 SEC    (02-19-18 @ 05:40)     INR    3.28          (02-19-18 @ 05:40), COAGULATION STUDIES:     aPTT  60.6 SEC    (02-18-18 @ 04:10)     PT     43.5 SEC    (02-18-18 @ 04:10)     INR    3.81          (02-18-18 @ 04:10)     POCT Blood Glucose.: 100 mg/dL (02-19-18 @ 08:15)  POCT Blood Glucose.: 106 mg/dL (02-18-18 @ 22:25)  POCT Blood Glucose.: 80 mg/dL (02-18-18 @ 18:25)  POCT Blood Glucose.: 176 mg/dL (02-18-18 @ 12:47)        MICROBIOLOGY:      ECHO:       RADIOLOGY & ADDITIONAL TESTS: PATIENT: KURT STRONG   AGE: 61yo   GENDER: Male  ALLERGIES: No Known Allergies    CHIEF COMPLAINT:   SOB x few hours (13 Feb 2018 22:11)    INTERVAL EVENTS:     BP 80s / 50s-60s, HR 70s-90s w/out events overnight. Tried to wean levo gtt overnight, but SBP dropped to 70s, so currently still at 0.1 mcg. Did not have ultrafiltration yesterday, per cardiology recommendation.     Pt c/o nausea this morning, no vomiting. Also c/o sore throat. No CP, palpitations.      MEDICATIONS  ALBUTerol/ipratropium for Nebulization 3 milliLiter(s) Nebulizer every 4 hours  amiodarone    Tablet 100 milliGRAM(s) Oral daily  aspirin enteric coated 81 milliGRAM(s) Oral daily  atorvastatin 80 milliGRAM(s) Oral at bedtime  buDESOnide   90 MICROgram(s) Inhaler 2 Puff(s) Inhalation two times a day  chlorhexidine 4% Liquid 1 Application(s) Topical daily  dextrose 5%. 1000 milliLiter(s) (50 mL/Hr) IV Continuous <Continuous>  dextrose 5%. 1000 milliLiter(s) (50 mL/Hr) IV Continuous <Continuous>  dextrose 50% Injectable 12.5 Gram(s) IV Push once  dextrose 50% Injectable 25 Gram(s) IV Push once  dextrose 50% Injectable 25 Gram(s) IV Push once  dextrose Gel 1 Dose(s) Oral once PRN Blood Glucose LESS THAN 70 milliGRAM(s)/deciLiter  dextrose Gel 1 Dose(s) Oral once PRN Blood Glucose LESS THAN 70 milliGRAM(s)/deciliter  DOBUTamine Infusion 10 MICROgram(s)/kG/Min (22.5 mL/Hr) IV Continuous <Continuous>  docusate sodium 100 milliGRAM(s) Oral two times a day  finasteride 5 milliGRAM(s) Oral daily  glucagon  Injectable 1 milliGRAM(s) IntraMuscular once PRN Glucose <70 milliGRAM(s)/deciLiter  influenza   Vaccine 0.5 milliLiter(s) IntraMuscular once  insulin lispro (HumaLOG) corrective regimen sliding scale   SubCutaneous three times a day before meals  insulin lispro (HumaLOG) corrective regimen sliding scale   SubCutaneous at bedtime  levothyroxine 75 MICROGram(s) Oral daily  midodrine 30 milliGRAM(s) Oral three times a day  Nephro-lynda 1 Tablet(s) Oral daily  norepinephrine Infusion 0.1 MICROgram(s)/kG/Min (14.063 mL/Hr) IV Continuous <Continuous>  ondansetron Injectable 4 milliGRAM(s) IV Push once PRN Nausea and/or Vomiting  polyethylene glycol 3350 17 Gram(s) Oral daily  senna 2 Tablet(s) Oral at bedtime  sodium chloride 0.9%. 1000 milliLiter(s) (100 mL/Hr) IV Continuous <Continuous>      VITAL SIGNS (last 24 hrs):  ICU Vital Signs Last 24 Hrs  T(C): 36.1 (19 Feb 2018 07:44), Max: 36.4 (18 Feb 2018 16:00)  T(F): 97 (19 Feb 2018 07:44), Max: 97.5 (18 Feb 2018 16:00)  HR: 82 (19 Feb 2018 07:13) (78 - 100)  BP: 89/65 (19 Feb 2018 06:00) (73/45 - 96/62)  BP(mean): 70 (19 Feb 2018 06:00) (51 - 70)  ABP: --  ABP(mean): --  RR: 18 (19 Feb 2018 06:00) (11 - 26)  SpO2: 100% (19 Feb 2018 07:13) (84% - 100%)      PHYSICAL EXAM:  GENERAL: tired and ill-appearing  HEENT:  AT/NC; EOMI, PERRLA, conjunctiva and sclera clear; hearing grossly intact  NECK: Supple, non tender  CHEST/LUNG: B/L crackles, wheezes; saturating >95% on venturi mask 50% FiO2  HEART: Normal rate, regular rhythm; No murmurs, rubs, or gallops; JVD to mandible, trace pedal edema  ABDOMEN: soft, NTTP, nondistended, normoactive BS  MSK/EXTREMITIES:  Grossly normal strength, movement, tone, and ROM; Palpable peripheral pulses        WEIGHT:   Admission Weight (kg): 75.3 (02-18-18)  Daily     I/O SUMMARY:    02-18-18 @ 07:01  -  02-19-18 @ 07:00  --------------------------------------------------------  IN: 1243.2 mL / OUT: 0 mL / NET: 1243.2 mL        LABS:                         11.1   5.40  >-----< 106           ( 02-19-18 @ 05:40 )             35.2       130  |  90  |   34  ----------------------< 90    (02-19-18 @ 05:40)     4.1  |  23  |  7.09    Anion Gap: --  ,   129  |  89  |   30  ----------------------< 104    (02-19-18 @ 01:11)     4.1  |  22  |  7.03    Anion Gap: --  ,   135  |  102  |   25  ----------------------< 88    (02-18-18 @ 23:40)     3.0  |  17  |  5.15    Anion Gap: --    Ca   8.2   (02-19-18 @ 05:40)  Mg   2.0   (02-19-18 @ 05:40)  Phos 4.8   (02-19-18 @ 05:40)       TP 8.2     |  AST 2.7    -------------------------     Alb x     |  ALT x               (02-19-18 @ 05:40)  -------------------------     T-bili 2.7  |  AlkPh 183    D-bili x   COAGULATION STUDIES:     aPTT  82.0 SEC    (02-19-18 @ 05:40)     PT     38.7 SEC    (02-19-18 @ 05:40)     INR    3.28          (02-19-18 @ 05:40), COAGULATION STUDIES:     aPTT  60.6 SEC    (02-18-18 @ 04:10)     PT     43.5 SEC    (02-18-18 @ 04:10)     INR    3.81          (02-18-18 @ 04:10)     POCT Blood Glucose.: 100 mg/dL (02-19-18 @ 08:15)  POCT Blood Glucose.: 106 mg/dL (02-18-18 @ 22:25)  POCT Blood Glucose.: 80 mg/dL (02-18-18 @ 18:25)  POCT Blood Glucose.: 176 mg/dL (02-18-18 @ 12:47)        MICROBIOLOGY:      ECHO:       RADIOLOGY & ADDITIONAL TESTS:

## 2018-02-19 NOTE — PROGRESS NOTE ADULT - PROBLEM SELECTOR PLAN 1
Maintenance HD schedule MWF. clinically hypervolemic, K acceptable  scheduled for HD today w/2k bath, UF 1.5-2kg as tolearted, low bath temp, Na variation  PUF cancelled by CCU team yesterday   titrate up pressors as needed as per CCU  Electrolytes acceptable. chronic volume overload 2/2 sev CMP  agree w/ inc midodrine back to 30mg tid, home dose  c/w renal diet, fluid restrictions

## 2018-02-19 NOTE — PROGRESS NOTE ADULT - PROBLEM SELECTOR PLAN 4
Cont bronchodilators: Pt has not been wheezing much  2/17 wheezing today. On Duoneb and Symbicort.  2/18 wheezing but less than yesterday. Continue current management  2/19: Cont pulmicort:

## 2018-02-19 NOTE — PROGRESS NOTE ADULT - PROBLEM SELECTOR PLAN 6
.  - Baseline 4-5L O2 at home.  - c/w supplemental O2, currently requiring venturi mask at FiO2 50%  - Will use BiPAP as needed  - Symbicort changed to Pulmicort  Silas Sheldon

## 2018-02-19 NOTE — PROGRESS NOTE ADULT - PROBLEM SELECTOR PLAN 9
.  - DVT: Redosing Coumadin when INR therapeutic.    Sean Martínez MD  PGY-1 | Internal Medicine  [phone] / [phone]

## 2018-02-19 NOTE — PROGRESS NOTE ADULT - PROBLEM SELECTOR PLAN 2
.  - TTE Oct 2017: EF 21%, severely dilated LA, mod LV enlargement, Severe global LV syst dysfxn, RV enlargement w/ decreased RV syst fxn, mod TR, mild ME.  - increased pulmonary edema on 2/17 PM CXR compared to 2/17 AM CXR  - JVD to mandible, B/L crackles on exam  - c/w dobutamine gtt  - HD today -- per Dr. Flores (pt's nephrologist), pt is chronically fluid overloaded 2/2 poor volume restriction adherence

## 2018-02-19 NOTE — PROGRESS NOTE ADULT - ASSESSMENT
63yo M with ESRD (M/W/F), NICM (EF 21%, s/p ICD, PICC with home dobutamine gtt), AFib (on coumadin), COPD on home O2 (4-5L), pulmonary fibrosis, hypotension (on midodrine) admitted with Acute on Chronic Respiratory Failure 2/2 influenza B infection s/p tamiflu x 5d exacerbating underlying pulmonary fibrosis/COPD, transferred to CCU for acute on chronic hypotension.

## 2018-02-19 NOTE — PROGRESS NOTE ADULT - PROBLEM SELECTOR PLAN 2
issa 5 days of tamiflu  2/17 treated with Tamiflu  2/18 treated. no chills, no fever, no bodyache  2/19: resolved

## 2018-02-19 NOTE — PROGRESS NOTE ADULT - ASSESSMENT
Imp: Severe non ischemic cardiomyopathy with AF and Influenza and marginal BP which has improved.  Rec: Usual dose of Midodrine 30 mg tid has been restarted and taper off the Norepi as long as the systolic BP is > 85 mm Hg.  For usual HD in the AM.   Hold Coumadin  INR is 3.28.

## 2018-02-20 ENCOUNTER — TRANSCRIPTION ENCOUNTER (OUTPATIENT)
Age: 63
End: 2018-02-20

## 2018-02-20 LAB
ALBUMIN SERPL ELPH-MCNC: 2.6 G/DL — LOW (ref 3.3–5)
ALP SERPL-CCNC: 203 U/L — HIGH (ref 40–120)
ALT FLD-CCNC: 55 U/L — HIGH (ref 4–41)
AST SERPL-CCNC: 91 U/L — HIGH (ref 4–40)
BILIRUB SERPL-MCNC: 0.9 MG/DL — SIGNIFICANT CHANGE UP (ref 0.2–1.2)
BUN SERPL-MCNC: 22 MG/DL — SIGNIFICANT CHANGE UP (ref 7–23)
CALCIUM SERPL-MCNC: 7.8 MG/DL — LOW (ref 8.4–10.5)
CHLORIDE SERPL-SCNC: 93 MMOL/L — LOW (ref 98–107)
CO2 SERPL-SCNC: 22 MMOL/L — SIGNIFICANT CHANGE UP (ref 22–31)
CREAT SERPL-MCNC: 5.28 MG/DL — HIGH (ref 0.5–1.3)
GLUCOSE SERPL-MCNC: 124 MG/DL — HIGH (ref 70–99)
HCT VFR BLD CALC: 34.9 % — LOW (ref 39–50)
HGB BLD-MCNC: 11 G/DL — LOW (ref 13–17)
INR BLD: 3.17 — HIGH (ref 0.88–1.17)
MAGNESIUM SERPL-MCNC: 2 MG/DL — SIGNIFICANT CHANGE UP (ref 1.6–2.6)
MCHC RBC-ENTMCNC: 29.8 PG — SIGNIFICANT CHANGE UP (ref 27–34)
MCHC RBC-ENTMCNC: 31.5 % — LOW (ref 32–36)
MCV RBC AUTO: 94.6 FL — SIGNIFICANT CHANGE UP (ref 80–100)
NRBC # FLD: 0 — SIGNIFICANT CHANGE UP
PHOSPHATE SERPL-MCNC: 3.5 MG/DL — SIGNIFICANT CHANGE UP (ref 2.5–4.5)
PLATELET # BLD AUTO: 105 K/UL — LOW (ref 150–400)
PMV BLD: 12.4 FL — SIGNIFICANT CHANGE UP (ref 7–13)
POTASSIUM SERPL-MCNC: 3.7 MMOL/L — SIGNIFICANT CHANGE UP (ref 3.5–5.3)
POTASSIUM SERPL-SCNC: 3.7 MMOL/L — SIGNIFICANT CHANGE UP (ref 3.5–5.3)
PROT SERPL-MCNC: 7.5 G/DL — SIGNIFICANT CHANGE UP (ref 6–8.3)
PROTHROM AB SERPL-ACNC: 36 SEC — HIGH (ref 9.8–13.1)
RBC # BLD: 3.69 M/UL — LOW (ref 4.2–5.8)
RBC # FLD: 16.2 % — HIGH (ref 10.3–14.5)
SODIUM SERPL-SCNC: 132 MMOL/L — LOW (ref 135–145)
WBC # BLD: 4.93 K/UL — SIGNIFICANT CHANGE UP (ref 3.8–10.5)
WBC # FLD AUTO: 4.93 K/UL — SIGNIFICANT CHANGE UP (ref 3.8–10.5)

## 2018-02-20 PROCEDURE — 93010 ELECTROCARDIOGRAM REPORT: CPT

## 2018-02-20 RX ORDER — METOCLOPRAMIDE HCL 10 MG
10 TABLET ORAL ONCE
Qty: 0 | Refills: 0 | Status: COMPLETED | OUTPATIENT
Start: 2018-02-20 | End: 2018-02-20

## 2018-02-20 RX ORDER — DIPHENHYDRAMINE HYDROCHLORIDE AND LIDOCAINE HYDROCHLORIDE AND ALUMINUM HYDROXIDE AND MAGNESIUM HYDRO
30 KIT THREE TIMES A DAY
Qty: 0 | Refills: 0 | Status: DISCONTINUED | OUTPATIENT
Start: 2018-02-20 | End: 2018-03-23

## 2018-02-20 RX ORDER — DOBUTAMINE HCL 250MG/20ML
7.5 VIAL (ML) INTRAVENOUS
Qty: 500 | Refills: 0 | Status: DISCONTINUED | OUTPATIENT
Start: 2018-02-20 | End: 2018-03-05

## 2018-02-20 RX ORDER — IPRATROPIUM/ALBUTEROL SULFATE 18-103MCG
3 AEROSOL WITH ADAPTER (GRAM) INHALATION EVERY 6 HOURS
Qty: 0 | Refills: 0 | Status: DISCONTINUED | OUTPATIENT
Start: 2018-02-20 | End: 2018-03-23

## 2018-02-20 RX ADMIN — Medication 3 MILLILITER(S): at 05:32

## 2018-02-20 RX ADMIN — Medication 2: at 13:06

## 2018-02-20 RX ADMIN — DIPHENHYDRAMINE HYDROCHLORIDE AND LIDOCAINE HYDROCHLORIDE AND ALUMINUM HYDROXIDE AND MAGNESIUM HYDRO 30 MILLILITER(S): KIT at 17:06

## 2018-02-20 RX ADMIN — CHLORHEXIDINE GLUCONATE 1 APPLICATION(S): 213 SOLUTION TOPICAL at 13:07

## 2018-02-20 RX ADMIN — Medication 2 PUFF(S): at 10:04

## 2018-02-20 RX ADMIN — Medication 100 MILLIGRAM(S): at 05:50

## 2018-02-20 RX ADMIN — Medication 10 MILLIGRAM(S): at 22:38

## 2018-02-20 RX ADMIN — Medication 3 MILLILITER(S): at 15:53

## 2018-02-20 RX ADMIN — Medication 100 MILLIGRAM(S): at 17:05

## 2018-02-20 RX ADMIN — Medication 75 MICROGRAM(S): at 05:50

## 2018-02-20 RX ADMIN — FINASTERIDE 5 MILLIGRAM(S): 5 TABLET, FILM COATED ORAL at 13:01

## 2018-02-20 RX ADMIN — Medication 81 MILLIGRAM(S): at 13:01

## 2018-02-20 RX ADMIN — ATORVASTATIN CALCIUM 80 MILLIGRAM(S): 80 TABLET, FILM COATED ORAL at 22:00

## 2018-02-20 RX ADMIN — MIDODRINE HYDROCHLORIDE 30 MILLIGRAM(S): 2.5 TABLET ORAL at 22:00

## 2018-02-20 RX ADMIN — MIDODRINE HYDROCHLORIDE 30 MILLIGRAM(S): 2.5 TABLET ORAL at 14:47

## 2018-02-20 RX ADMIN — POLYETHYLENE GLYCOL 3350 17 GRAM(S): 17 POWDER, FOR SOLUTION ORAL at 17:05

## 2018-02-20 RX ADMIN — Medication 1 TABLET(S): at 13:03

## 2018-02-20 RX ADMIN — SENNA PLUS 2 TABLET(S): 8.6 TABLET ORAL at 22:00

## 2018-02-20 RX ADMIN — DIPHENHYDRAMINE HYDROCHLORIDE AND LIDOCAINE HYDROCHLORIDE AND ALUMINUM HYDROXIDE AND MAGNESIUM HYDRO 30 MILLILITER(S): KIT at 22:01

## 2018-02-20 RX ADMIN — Medication 3 MILLILITER(S): at 22:32

## 2018-02-20 RX ADMIN — Medication 3 MILLILITER(S): at 01:26

## 2018-02-20 RX ADMIN — BENZOCAINE AND MENTHOL 1 LOZENGE: 5; 1 LIQUID ORAL at 01:20

## 2018-02-20 RX ADMIN — DIPHENHYDRAMINE HYDROCHLORIDE AND LIDOCAINE HYDROCHLORIDE AND ALUMINUM HYDROXIDE AND MAGNESIUM HYDRO 30 MILLILITER(S): KIT at 05:49

## 2018-02-20 RX ADMIN — MIDODRINE HYDROCHLORIDE 30 MILLIGRAM(S): 2.5 TABLET ORAL at 05:50

## 2018-02-20 RX ADMIN — Medication 3 MILLILITER(S): at 10:04

## 2018-02-20 RX ADMIN — ONDANSETRON 4 MILLIGRAM(S): 8 TABLET, FILM COATED ORAL at 01:20

## 2018-02-20 RX ADMIN — AMIODARONE HYDROCHLORIDE 100 MILLIGRAM(S): 400 TABLET ORAL at 05:50

## 2018-02-20 NOTE — DISCHARGE NOTE ADULT - SECONDARY DIAGNOSIS.
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

## 2018-02-20 NOTE — PROGRESS NOTE ADULT - ASSESSMENT
#severe nicm with icd  #AF  #ESRD  #COPD  #Flu b  Case reviewed with ccu team: wean norepias tolerated.

## 2018-02-20 NOTE — DIETITIAN INITIAL EVALUATION ADULT. - PROBLEM SELECTOR PLAN 6
-c/w dobutamine gtts at 10 mcg/kg/hr  -f/u Dr. Davis recommendations  -does not appear to be in decompensated HF at this time. Not in need of urgent HD at this time.

## 2018-02-20 NOTE — PROGRESS NOTE ADULT - PROBLEM SELECTOR PLAN 2
finish 5 days of tamiflu  2/17 treated with Tamiflu  2/18 treated. no chills, no fever, no bodyache  2/19: resolved

## 2018-02-20 NOTE — DISCHARGE NOTE ADULT - MEDICATION SUMMARY - MEDICATIONS TO TAKE
I will START or STAY ON the medications listed below when I get home from the hospital:    finasteride 5 mg oral tablet  -- 1 tab(s) by mouth once a day  -- Indication: For BPH    Ecotrin Adult Low Strength 81 mg oral delayed release tablet  -- 1 tab(s) by mouth once a day  -- Indication: For Chronic heart failure, unspecified heart failure type    amiodarone 100 mg oral tablet  -- 1 tab(s) by mouth once a day   -- Avoid grapefruit and grapefruit juice while taking this medication.  Avoid prolonged or excessive exposure to direct and/or artificial sunlight while taking this medication.  Do not take this drug if you are pregnant.  It is very important that you take or use this exactly as directed.  Do not skip doses or discontinue unless directed by your doctor.  May cause drowsiness or dizziness.    -- Indication: For AF (atrial fibrillation)    Coumadin 3 mg oral tablet  -- 1 tab(s) by mouth once a day (at bedtime)   -- Do not take this drug if you are pregnant.  It is very important that you take or use this exactly as directed.  Do not skip doses or discontinue unless directed by your doctor.  Obtain medical advice before taking any non-prescription drugs as some may affect the action of this medication.    -- Indication: For AF (atrial fibrillation)    atorvastatin 80 mg oral tablet  -- 1 tab(s) by mouth once a day (at bedtime)  -- Indication: For Hyperlipdiemia    budesonide-formoterol 160 mcg-4.5 mcg/inh inhalation aerosol  -- 2 puff(s) inhaled 2 times a day  -- Indication: For Pulmonary fibrosis    ProAir HFA 90 mcg/inh inhalation aerosol  -- 2 puff(s) inhaled every 6 hours, As Needed for wheezing  -- For inhalation only.  It is very important that you take or use this exactly as directed.  Do not skip doses or discontinue unless directed by your doctor.  Obtain medical advice before taking any non-prescription drugs as some may affect the action of this medication.  Shake well before use.    -- Indication: For Pulmonary fibrosis    DOBUTamine 2 mg/mL-D5% intravenous solution  -- 7.5 mcg/kg intravenous once a day   -- Indication: For Chronic heart failure, unspecified heart failure type    senna oral tablet  -- 2 tab(s) by mouth once a day (at bedtime)  -- Indication: For COnstipation    docusate sodium 100 mg oral capsule  -- 1 cap(s) by mouth 2 times a day  -- Indication: For COnstipation    midodrine 10 mg oral tablet  -- 3 tab(s) by mouth 3 times a day  -- It is very important that you take or use this exactly as directed.  Do not skip doses or discontinue unless directed by your doctor.  Obtain medical advice before taking any non-prescription drugs as some may affect the action of this medication.    -- Indication: For Hypotension    sodium chloride 0.65% nasal spray  -- 1 spray(s) into nose every 1 to 2 hours, As Needed   -- Indication: For Nasal spray    Synthroid 75 mcg (0.075 mg) oral tablet  -- 1 tab(s) by mouth once a day  -- Indication: For Hypothyroidism    Carrie-Tiffanie oral tablet  -- 1 tab(s) by mouth once a day  -- Indication: For Supplement

## 2018-02-20 NOTE — DISCHARGE NOTE ADULT - DURABLE MEDICAL EQUIPMENT AGENCY
[facility] [phone]  Bipap machine  [facility]   [phone]

## 2018-02-20 NOTE — PROGRESS NOTE ADULT - SUBJECTIVE AND OBJECTIVE BOX
PATIENT: KURT STRONG   AGE: 61yo   GENDER: Male  ALLERGIES: No Known Allergies    CHIEF COMPLAINT:   SOB x few hours (13 Feb 2018 22:11)    INTERVAL EVENTS:         MEDICATIONS  ALBUTerol/ipratropium for Nebulization 3 milliLiter(s) Nebulizer every 4 hours  amiodarone    Tablet 100 milliGRAM(s) Oral daily  aspirin enteric coated 81 milliGRAM(s) Oral daily  atorvastatin 80 milliGRAM(s) Oral at bedtime  benzocaine 15 mG/menthol 3.6 mG Lozenge 1 Lozenge Oral every 6 hours PRN Sore Throat  buDESOnide   90 MICROgram(s) Inhaler 2 Puff(s) Inhalation two times a day  chlorhexidine 4% Liquid 1 Application(s) Topical daily  dextrose 5%. 1000 milliLiter(s) (50 mL/Hr) IV Continuous <Continuous>  dextrose 5%. 1000 milliLiter(s) (50 mL/Hr) IV Continuous <Continuous>  dextrose 50% Injectable 12.5 Gram(s) IV Push once  dextrose 50% Injectable 25 Gram(s) IV Push once  dextrose 50% Injectable 25 Gram(s) IV Push once  dextrose Gel 1 Dose(s) Oral once PRN Blood Glucose LESS THAN 70 milliGRAM(s)/deciLiter  dextrose Gel 1 Dose(s) Oral once PRN Blood Glucose LESS THAN 70 milliGRAM(s)/deciliter  DOBUTamine Infusion 10 MICROgram(s)/kG/Min (22.5 mL/Hr) IV Continuous <Continuous>  docusate sodium 100 milliGRAM(s) Oral two times a day  finasteride 5 milliGRAM(s) Oral daily  glucagon  Injectable 1 milliGRAM(s) IntraMuscular once PRN Glucose <70 milliGRAM(s)/deciLiter  influenza   Vaccine 0.5 milliLiter(s) IntraMuscular once  insulin lispro (HumaLOG) corrective regimen sliding scale   SubCutaneous three times a day before meals  insulin lispro (HumaLOG) corrective regimen sliding scale   SubCutaneous at bedtime  levothyroxine 75 MICROGram(s) Oral daily  midodrine 30 milliGRAM(s) Oral three times a day  MIRACLE MOUTHWASH 30 milliLiter(s) Swish and Spit three times a day  Nephro-lynda 1 Tablet(s) Oral daily  norepinephrine Infusion 0.1 MICROgram(s)/kG/Min (14.063 mL/Hr) IV Continuous <Continuous>  polyethylene glycol 3350 17 Gram(s) Oral daily  senna 2 Tablet(s) Oral at bedtime      VITAL SIGNS (last 24 hrs):  ICU Vital Signs Last 24 Hrs  T(C): 36.7 (20 Feb 2018 06:00), Max: 36.7 (20 Feb 2018 06:00)  T(F): 98.1 (20 Feb 2018 06:00), Max: 98.1 (20 Feb 2018 06:00)  HR: 94 (20 Feb 2018 06:00) (71 - 95)  BP: 87/34 (20 Feb 2018 06:00) (72/43 - 109/75)  BP(mean): 47 (20 Feb 2018 06:00) (47 - 78)  ABP: --  ABP(mean): --  RR: 20 (20 Feb 2018 06:00) (13 - 22)  SpO2: 80% (20 Feb 2018 06:00) (80% - 100%)      PHYSICAL EXAM:  GENERAL: NAD  HEENT:  AT/NC; EOMI, PERRLA, conjunctiva and sclera clear; hearing grossly intact  NECK: Supple, non tender  CHEST/LUNG: CTAB, no wheezes, ronchi, rales  HEART: Normal rate, regular rhythm; No murmurs, rubs, or gallops; No JVD or peripheral edema  ABDOMEN: soft, NTTP, nondistended, normoactive BS  MSK/EXTREMITIES:  Grossly normal strength, movement, tone, and ROM; Palpable peripheral pulses; No clubbing or cyanosis  PSYCH: AAOx4  NEUROLOGY: Non focal   SKIN: No rashes or lesions      WEIGHT:   Admission Weight (kg): 75.3 (02-18-18)  Daily     I/O SUMMARY:    02-18-18 @ 07:01  -  02-19-18 @ 07:00  --------------------------------------------------------  IN: 1243.2 mL / OUT: 0 mL / NET: 1243.2 mL    02-19-18 @ 07:01  -  02-20-18 @ 06:58  --------------------------------------------------------  IN: 1598 mL / OUT: 2600 mL / NET: -1002 mL        LABS:                         11.0   4.93  >-----< 105           ( 02-20-18 @ 06:00 )             34.9       Ca   8.2   (02-19-18 @ 05:40)  Mg   2.0   (02-19-18 @ 05:40)  Phos 4.8   (02-19-18 @ 05:40)       TP 8.2     |  AST 2.7    -------------------------     Alb x     |  ALT x               (02-19-18 @ 05:40)  -------------------------     T-bili 2.7  |  AlkPh 183    D-bili x   COAGULATION STUDIES:     aPTT  x        (02-20-18 @ 06:00)     PT     36.0 SEC    (02-20-18 @ 06:00)     INR    3.17          (02-20-18 @ 06:00), COAGULATION STUDIES:     aPTT  82.0 SEC    (02-19-18 @ 05:40)     PT     38.7 SEC    (02-19-18 @ 05:40)     INR    3.28          (02-19-18 @ 05:40)     POCT Blood Glucose.: 112 mg/dL (02-20-18 @ 06:11)  POCT Blood Glucose.: 138 mg/dL (02-19-18 @ 22:10)  POCT Blood Glucose.: 72 mg/dL (02-19-18 @ 17:54)  POCT Blood Glucose.: 154 mg/dL (02-19-18 @ 12:56)  POCT Blood Glucose.: 100 mg/dL (02-19-18 @ 08:15)        MICROBIOLOGY:      ECHO:       RADIOLOGY & ADDITIONAL TESTS:

## 2018-02-20 NOTE — DISCHARGE NOTE ADULT - CARE PROVIDER_API CALL
Thaddeus Davis), Cardiovascular Disease; Internal Medicine  [address] [id number]  Phone: [phone]  Fax: [phone]

## 2018-02-20 NOTE — PROGRESS NOTE ADULT - ASSESSMENT
61yo M with ESRD (M/W/F), NICM (EF 21%, s/p ICD, PICC with home dobutamine gtt), AFib (on coumadin), COPD on home O2 (4-5L), pulmonary fibrosis, hypotension (on midodrine) admitted with Acute on Chronic Respiratory Failure 2/2 influenza B infection s/p tamiflu x 5d exacerbating underlying pulmonary fibrosis/COPD, transferred to CCU for acute on chronic hypotension.

## 2018-02-20 NOTE — PROGRESS NOTE ADULT - PROBLEM SELECTOR PLAN 2
.  - TTE Oct 2017: EF 21%, severely dilated LA, mod LV enlargement, Severe global LV syst dysfxn, RV enlargement w/ decreased RV syst fxn, mod TR, mild [place].  - increased pulmonary edema on 2/17 PM CXR compared to 2/17 AM CXR  - JVD to mandible, B/L crackles on exam  - c/w dobutamine gtt  - HD today -- per Dr. Flores (pt's nephrologist), pt is chronically fluid overloaded 2/2 poor volume restriction adherence

## 2018-02-20 NOTE — PROGRESS NOTE ADULT - PROBLEM SELECTOR PLAN 4
Cont bronchodilators: Pt has not been wheezing much  2/17 wheezing today. On Duoneb and Symbicort.  2/18 wheezing but less than yesterday. Continue current management  2/19: Cont pulmicort:  2/20 no wheezing today, c/w duonebs,symbicort

## 2018-02-20 NOTE — PROGRESS NOTE ADULT - PROBLEM SELECTOR PLAN 3
no real treatment: supportive treatment:  2/17 keep O2 sat greater than 90%  2/18 keep O2 sat greater than 90%  2/19: extensive fibrosis: no real treatment

## 2018-02-20 NOTE — DISCHARGE NOTE ADULT - PLAN OF CARE
To relieve and prevent worsening symptoms associated with congestive heart failure, to improve quality of life, and to treat underlying conditions such as coronary heart disease, high blood pressure, or diabetes, and to maintain euvolemia. Low salt diet, fluid restriction to 1500 ml daily, monitor your fluid intake and weight daily, exercise as tolerated 30 minutes daily, and follow up with your physician within 1 to 2 weeks. Continue home dobutamine. Please take your medications as prescribed.  Continue to take your blood thinner as prescribed and follow with your physician to monitor your levels.  Low fat diet, reduce caffeine intake, and exercise at least 30 minutes daily. Please follow up with your nephrologist for management, and continue you schedule hemodialysis. Your nephrologist may schedule you extra sessions for fluid removal as needed. Monitor finger sticks pre-meal and bedtime, low salt, fat and carbohydrate diet, minimize glucose intake.  Exercise daily for at least 30 minutes and weight loss.  Follow up with primary care physician and endocrinologist for routine Hemoglobin A1C checks and management.  Follow up with your ophthalmologist for routine yearly vision exams. Continue medications as prescribed

## 2018-02-20 NOTE — PROGRESS NOTE ADULT - PROBLEM SELECTOR PLAN 1
Maintenance HD schedule MWF.  Pt tolerated HD yesterday, with 2kg achieved. Flowsheet reviewed.    Plan for repeat HD tomorrow, with lowered UF goal, given improved volume status and hypotension worse than baseline levels.   Electrolytes acceptable. chronic volume overload 2/2 severe CMP  Continue midodrine. Levophed titration as per primary team.   c/w renal diet, fluid restrictions

## 2018-02-20 NOTE — DISCHARGE NOTE ADULT - HOSPITAL COURSE
CCU COURSE:  Patient transferred to CCU for acute on chronic hypotension. 61yo M with ESRD (M/W/F), NICM (EF 21%, s/p ICD, PICC with home dobutamine gtt rate @ 7.5), AFib (on coumadin), COPD on home O2 (4-5L), pulmonary fibrosis, hypotension (on midodrine) admitted with Acute on Chronic Respiratory Failure 2/2 influenza B infection s/p tamiflu x 5d exacerbating underlying pulmonary fibrosis/COPD, transferred to CCU for acute on chronic hypotension (baseline SBP 90s-100s). In the CCU, patient was started on levophed infusion due to hypotension, in addition to continuing midodrine 10mg TID he had been on inpatient. Home dose of dobutamine was continued. Patient required venturi mask at 50% FiO2. BP was persistently low, with SBP in 80s. Per patient's cardiologist, home dose of midodrine was 30mg TID, so this change was applied. CCU team attempted to wean levophed infusion while maintaining goal SBP >85, per patient's cardiologist, however the patient's BP did not tolerate it. Patient continued to receive HD as scheduled. CCU COURSE:  Patient transferred to CCU for acute on chronic hypotension. 61yo M with ESRD (M/W/F), NICM (EF 21%, s/p ICD, PICC with home dobutamine gtt rate @ 7.5), AFib (on coumadin), COPD on home O2 (4-5L), pulmonary fibrosis, hypotension (on midodrine) admitted with Acute on Chronic Respiratory Failure 2/2 influenza B infection s/p tamiflu x 5d exacerbating underlying pulmonary fibrosis/COPD, transferred to CCU for acute on chronic hypotension (baseline SBP 90s-100s). In the CCU, patient was started on levophed infusion due to hypotension, in addition to continuing midodrine 10mg TID he had been on inpatient. Home dose of dobutamine was continued. Patient required venturi mask at 50% FiO2. BP was persistently low, with SBP in 80s. Per patient's cardiologist, home dose of midodrine was 30mg TID, so this change was applied. CCU team attempted to wean levophed infusion while maintaining goal SBP >85, per patient's cardiologist, however patient's BP did not tolerate it. He had frequent decreases to SBP 70s and occasionally to 60s, however continued to mentate and deny symptoms. Patient continued to receive HD as scheduled. CCU COURSE:  Patient transferred to CCU for acute on chronic hypotension. 61yo M with ESRD (M/W/F), NICM (EF 21%, s/p ICD, PICC with home dobutamine gtt rate @ 7.5), AFib (on coumadin), COPD on home O2 (4-5L), pulmonary fibrosis, hypotension (on midodrine) admitted with Acute on Chronic Respiratory Failure 2/2 influenza B infection s/p tamiflu x 5d exacerbating underlying pulmonary fibrosis/COPD, transferred to CCU for acute on chronic hypotension (baseline SBP 90s-100s). In the CCU, patient was started on levophed infusion due to hypotension, in addition to continuing midodrine 10mg TID he had been on inpatient. Home dose of dobutamine was continued. Patient required venturi mask at 50% FiO2. BP was persistently low, with SBP in 80s. Per patient's cardiologist, home dose of midodrine was 30mg TID, so this change was applied. CCU team attempted to wean levophed infusion while maintaining goal SBP >85, per patient's cardiologist, however patient's BP did not tolerate it. He had frequent decreases to SBP 70s and occasionally to 60s, however continued to mentate and deny symptoms. Patient continued to receive HD as scheduled. After several days, BP improved and levophed infusion was weaned until discontinued. Pt continued to require FiO2 50% venturi mask and was therefore transferred to medicine telemetry unit for further care until able to be weaned back to baseline supplemental O2 level of 4-5L NC. Pt was HD stable at the time of transfer. 62 M PMH ESRD on HD, NICM with severe systolic dysfunction (EF 21%), on chronic dobutamine gtt via LUE PICC, also with AICD, chronic hypotension on midodrine 30mg TID, atrial fibrillation on warfarin, COPD, pulmonary fibrosis on home O2, initially presented with acute on chronic respiratory failure in setting of influenza B infection. He was managed in the CCU on norepinephrine - transition to dobutamine/midodrine.  Pt's respiratory status remains very tenuous, requiring additional sessions of HD for fluid removal.  Extensive family meeting 3/8, pt wants to remain full code; refuses any discussion revolving around hospice care.      Problem/Plan - 1:  ·  Problem: Acute on chronic respiratory failure with hypoxia.  Plan: - Multifactorial due to advanced COPD, pulmonary fibrosis and end stage heart failure.  No additional means of therapy will reverse his chronic illnesses at this point.  - Supplemental oxygen to maintain O2 sat >88% s/p completion of prolonged steroid taper  - BiPAP QHS.      Problem/Plan - 2:  ·  Problem: Acute on chronic systolic heart failure.  Plan: clinically improved volume status, and improved symptoms. now receiving HD on regular schedule starting this week. will DC plan after HD with out-pt follow up. Cw  gtt.      Problem/Plan - 3:  ·  Problem: Chronic hypotension.  Plan: - BP stable and at goal. -c/w midodrine and dobutamine gtt.      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  Plan: - FS overall controlled  - Continue with Lantus 20U QHS, pre-meal insulin discontinued as FSG readings are lower now that pt is off steroids.      Problem/Plan - 5:  ·  Problem: Pulmonary fibrosis.  Plan: - c/w supportive care, supplemental oxygen.      Problem/Plan - 6:  Problem: Atrial fibrillation, unspecified type. Plan: - Hypercoagulable state 2/2 chronic Afib   - HR at goal. Not a candidate for beta blockers given chronic hypotension.  - On warfarin.  hold coumadin tonight for supra-therapeutic NR   - c/w amiodarone; as per discussion with Dr. Davis, would continue with Amiodarone despite pulmonary fibrosis.     Problem/Plan - 7:  ·  Problem: Hypothyroidism, unspecified type.  Plan: c/w levothyroxine daily.      Problem/Plan - 8:  ·  Problem: Advanced care planning/counseling discussion.  Plan: - Pt wishes to continue with all aggressive measures; states that even if he's dependent on machines, he wants to do everything to stay alive.  - Very poor prognosis, high chance of decompensation after discharge.  Family fully aware of pt's decisions. DC planning for today or rell post HD. 62 M PMH ESRD on HD, NICM with severe systolic dysfunction (EF 21%), on chronic dobutamine gtt via LUE PICC, also with AICD, chronic hypotension on midodrine 30mg TID, atrial fibrillation on warfarin, COPD, pulmonary fibrosis on home O2, initially presented with acute on chronic respiratory failure in setting of influenza B infection. He was managed in the CCU on norepinephrine - transition to dobutamine/midodrine.  Pt's respiratory status remains very tenuous, requiring additional sessions of HD for fluid removal.  Extensive family meeting 3/8, pt wants to remain full code; refuses any discussion revolving around hospice care.      Problem/Plan - 1:  ·  Problem: Acute on chronic respiratory failure with hypoxia.  Plan: - Multifactorial due to advanced COPD, pulmonary fibrosis and end stage heart failure.  No additional means of therapy will reverse his chronic illnesses at this point.  - Supplemental oxygen to maintain O2 sat >88% s/p completion of prolonged steroid taper  - BiPAP QHS.   -small volume hemoptysis; CT Chest negative, no change in H/H, VSS. Monitor.   -Leukocytosis - non-focal, afebrile, monitor.      Problem/Plan - 2:  ·  Problem: Acute on chronic systolic heart failure.  Plan: clinically improved volume status, and improved symptoms. now receiving HD on regular schedule starting this week. will DC plan after HD with out-pt follow up. Cw  gtt.      Problem/Plan - 3:  ·  Problem: Chronic hypotension.  Plan: - BP stable and at goal. -c/w midodrine and dobutamine gtt.      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  Plan: - FS overall controlled  - Continue with Lantus 20U QHS, pre-meal insulin discontinued as FSG readings are lower now that pt is off steroids.      Problem/Plan - 5:  ·  Problem: Pulmonary fibrosis.  Plan: - c/w supportive care, supplemental oxygen.      Problem/Plan - 6:  Problem: Atrial fibrillation, unspecified type. Plan: - Hypercoagulable state 2/2 chronic Afib   - HR at goal. Not a candidate for beta blockers given chronic hypotension.  - On warfarin.  hold coumadin tonight for supra-therapeutic NR   - c/w amiodarone; as per discussion with Dr. Davis, would continue with Amiodarone despite pulmonary fibrosis.     Problem/Plan - 7:  ·  Problem: Hypothyroidism, unspecified type.  Plan: c/w levothyroxine daily.      Problem/Plan - 8:  ·  Problem: Advanced care planning/counseling discussion.  Plan: - Pt wishes to continue with all aggressive measures; states that even if he's dependent on machines, he wants to do everything to stay alive.  - Very poor prognosis, high chance of decompensation after discharge.  Family fully aware of pt's decisions. DC planning for today 62 M PMH ESRD on HD, NICM with severe systolic dysfunction (EF 21%), on chronic dobutamine gtt via LUE PICC, also with AICD, chronic hypotension on midodrine 30mg TID, atrial fibrillation on warfarin, COPD, pulmonary fibrosis on home O2, initially presented with acute on chronic respiratory failure in setting of influenza B infection. He was managed in the CCU on norepinephrine - transition to dobutamine/midodrine.  Pt's respiratory status remains very tenuous, continued on  gtt with daily to every other day HD to alleviate volume overload, s/p course of steroids for PF,m COPD, now on BiPAP QHS. course c/b some scant hemoptysis, CT chest unchanged, H/H stable, likely due to chronic CHF. extensive d/w family and pt, decision was made for full code and to continue all treatment. d/w renal, can accommodate 4x/week HD. DC home with out-pt HD

## 2018-02-20 NOTE — DIETITIAN INITIAL EVALUATION ADULT. - PROBLEM SELECTOR PLAN 2
-patient with elevated CK, CKMB and troponin which are likely elevated in setting of acute infection in patient with very poor cardiorespiratory reserve given severe HF and COPD and pulmonary fibrosis.   -patient is without any chest pain, BP is at baseline, EKG without changes compared to prior  -would continue to monitor  -trend troponin.

## 2018-02-20 NOTE — DISCHARGE NOTE ADULT - CARE PLAN
Principal Discharge DX:	Acute on chronic systolic heart failure  Goal:	To relieve and prevent worsening symptoms associated with congestive heart failure, to improve quality of life, and to treat underlying conditions such as coronary heart disease, high blood pressure, or diabetes, and to maintain euvolemia.  Assessment and plan of treatment:	Low salt diet, fluid restriction to 1500 ml daily, monitor your fluid intake and weight daily, exercise as tolerated 30 minutes daily, and follow up with your physician within 1 to 2 weeks. Continue home dobutamine.  Secondary Diagnosis:	AF (atrial fibrillation)  Assessment and plan of treatment:	Please take your medications as prescribed.  Continue to take your blood thinner as prescribed and follow with your physician to monitor your levels.  Low fat diet, reduce caffeine intake, and exercise at least 30 minutes daily.  Secondary Diagnosis:	ESRD (end stage renal disease) on dialysis  Assessment and plan of treatment:	Please follow up with your nephrologist for management, and continue you schedule hemodialysis. Your nephrologist may schedule you extra sessions for fluid removal as needed.  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  Exercise daily for at least 30 minutes and weight loss.  Follow up with primary care physician and endocrinologist for routine Hemoglobin A1C checks and management.  Follow up with your ophthalmologist for routine yearly vision exams.  Secondary Diagnosis:	Pulmonary fibrosis  Assessment and plan of treatment:	Continue medications as prescribed

## 2018-02-20 NOTE — DIETITIAN INITIAL EVALUATION ADULT. - PROBLEM SELECTOR PLAN 3
-continue with duoneb q 4 hours standing and BiPAP for now. No signs of COPD exacerbation, no wheezing.   -tamiflu for influenza infection  -c/w symbicort,  -should patient worsen overnight would dose steroids and possibly azithromycin  -f/u Pulm in AM.

## 2018-02-20 NOTE — PROGRESS NOTE ADULT - SUBJECTIVE AND OBJECTIVE BOX
Pt seen and examined at bedside  Pt sitting up in chair, denies SOB or chest pain, or dizziness.   No acute events overnight. Still in IV pressors.     Allergies:  No Known Allergies    Hospital Medications:   MEDICATIONS  (STANDING):  ALBUTerol/ipratropium for Nebulization 3 milliLiter(s) Nebulizer every 4 hours  amiodarone    Tablet 100 milliGRAM(s) Oral daily  aspirin enteric coated 81 milliGRAM(s) Oral daily  atorvastatin 80 milliGRAM(s) Oral at bedtime  buDESOnide   90 MICROgram(s) Inhaler 2 Puff(s) Inhalation two times a day  chlorhexidine 4% Liquid 1 Application(s) Topical daily  dextrose 5%. 1000 milliLiter(s) (50 mL/Hr) IV Continuous <Continuous>  dextrose 5%. 1000 milliLiter(s) (50 mL/Hr) IV Continuous <Continuous>  dextrose 50% Injectable 12.5 Gram(s) IV Push once  dextrose 50% Injectable 25 Gram(s) IV Push once  dextrose 50% Injectable 25 Gram(s) IV Push once  DOBUTamine Infusion 7.5 MICROgram(s)/kG/Min (16.942 mL/Hr) IV Continuous <Continuous>  docusate sodium 100 milliGRAM(s) Oral two times a day  finasteride 5 milliGRAM(s) Oral daily  influenza   Vaccine 0.5 milliLiter(s) IntraMuscular once  insulin lispro (HumaLOG) corrective regimen sliding scale   SubCutaneous three times a day before meals  insulin lispro (HumaLOG) corrective regimen sliding scale   SubCutaneous at bedtime  levothyroxine 75 MICROGram(s) Oral daily  midodrine 30 milliGRAM(s) Oral three times a day  MIRACLE MOUTHWASH 30 milliLiter(s) Swish and Spit three times a day  Nephro-lynda 1 Tablet(s) Oral daily  norepinephrine Infusion 0.1 MICROgram(s)/kG/Min (14.063 mL/Hr) IV Continuous <Continuous>  polyethylene glycol 3350 17 Gram(s) Oral daily  senna 2 Tablet(s) Oral at bedtime    VITALS:  T(F): 98 (02-20-18 @ 07:55), Max: 98.1 (02-20-18 @ 06:00)  HR: 82 (02-20-18 @ 11:34)  BP: 100/57 (02-20-18 @ 11:00)  RR: 19 (02-20-18 @ 11:00)  SpO2: 95% (02-20-18 @ 11:34)  Wt(kg): --    02-19 @ 07:01  -  02-20 @ 07:00  --------------------------------------------------------  IN: 1598 mL / OUT: 2600 mL / NET: -1002 mL    02-20 @ 07:01  -  02-20 @ 12:43  --------------------------------------------------------  IN: 30.9 mL / OUT: 0 mL / NET: 30.9 mL        PHYSICAL EXAM:  Constitutional: NAD  HEENT: anicteric sclera, oropharynx clear, MMM  Neck: No JVD  Respiratory: Right sided crackles at bases. No wheeze   Cardiovascular: S1, S2, RRR  Gastrointestinal: BS+, soft, NT/ND  Extremities: No cyanosis or clubbing. No peripheral edema  Neurological: A/O x 3, no focal deficits  Psychiatric: Normal mood, normal affect  : No CVA tenderness. No rosa.   Skin: No rashes  Vascular Access: RIJ Tunneled cath.     LABS:  02-20    132<L>  |  93<L>  |  22  ----------------------------<  124<H>  3.7   |  22  |  5.28<H>    Ca    7.8<L>      20 Feb 2018 06:00  Phos  3.5     02-20  Mg     2.0     02-20    TPro  7.5  /  Alb  2.6<L>  /  TBili  0.9  /  DBili      /  AST  91<H>  /  ALT  55<H>  /  AlkPhos  203<H>  02-20    Creatinine Trend: 5.28 <--, 7.09 <--, 7.03 <--, 5.15 <--, 5.71 <--, 4.54 <--, 6.56 <--, 5.16 <--, 6.37 <--, 5.96 <--                        11.0   4.93  )-----------( 105      ( 20 Feb 2018 06:00 )             34.9     Urine Studies:      RADIOLOGY & ADDITIONAL STUDIES:

## 2018-02-20 NOTE — PROGRESS NOTE ADULT - PROBLEM SELECTOR PROBLEM 8
[facility]    [address] [facility] [address]    Phone:  [phone]       Thank You for choosing us for your health care visit. We are glad to serve you and happy to provide you with this summary of your visit. Please help us to ensure we have accurate records. If you find anything that needs to be changed, please let our staff know as soon as possible.          Your Demographic Information     Patient Name Sex Mustapha Gama Male 1946       Ethnic Group Patient Race    Not of  or  Origin White      Your Visit Details     Date & Time Provider Department    2017 8:45 AM [facility]      Your Vitals Were     Smoking Status                   Never Smoker           Medications Prescribed or Re-Ordered Today     None      Your Current Medications Are        Disp Refills Start End    Cholecalciferol (VITAMIN D-3 PO)        Sig - Route: Take 1 tablet by mouth daily. - Oral    Class: Historical Med    acetaminophen (TYLENOL ARTHRITIS PAIN) 650 MG CR tablet        Sig - Route: Take 1,300 mg by mouth every 8 hours as needed for Pain. - Oral    Class: Historical Med    azelastine (ASTELIN) 0.1 % nasal spray 30 mL 2 2017     Sig - Route: Spray 1 spray in each nostril 2 times daily. Use in each nostril as directed - Each Nare    Class: Eprescribe    ranitidine (ZANTAC) 150 MG tablet 180 tablet 1 3/27/2017     Sig: Take 1 tablet by mouth two  times daily    Class: Eprescribe    fluticasone (FLONASE) 50 MCG/ACT nasal spray 3 Bottle 3 2016     Sig: Use 2 sprays in each  nostril daily    Class: Eprescribe    Catheters Misc 120 each 11 2016     Sig: BARD (ref [id number])16fr coude tip catheter, use as directed 4x per day for neurogenic bladder    Lubricants (SURGICAL LUBRICANT) gel 60 g 11 2016     Sig: Use as directed with intermittent 16fr coude catheter for neurogenic bladder    aspirin 81 MG tablet 30 tablet 0  3/16/2016     Sig - Route: Take 1 tablet by mouth daily. - Oral    Notes to Pharmacy: 1 tablet every other day    docusate sodium (COLACE) 100 MG capsule 10 capsule 0 8/20/2011     Sig - Route: Take 1 capsule by mouth 2 times daily as needed for Constipation. - Oral      Allergies     No Known Allergies      Immunizations History as of 5/22/2017     Name Date    HERPES ZOSTER SHINGLES 8/10/2012    Hepatitis A - Adult 1/19/2011    INFLUENZA QUADRIVALENT 9/26/2016, 10/30/2015, 10/10/2014  9:34 AM    Influenza 10/20/2013, 10/25/2011, 10/15/2010, 10/22/2008, 11/14/2006, 12/21/2004    Pneumococcal Conjugate 13 Valent 3/11/2015    Pneumococcal Polysaccharide Adult 1/18/2010    Td:Adult type tetanus/diphtheria 5/31/2005  2:13 PM    Tdap 1/19/2011      Problem List as of 5/22/2017     Actinic keratosis    History of basal cell carcinoma--left cheek and mid nose    Dermatophytosis of nail    capsulitis 2nd and 3rd MTPJ, B feet    Esophageal reflux    Chronic rhinitis    Neurogenic Bladder    Right Complex Trimalleolar Fracture    Pros and Cons of PSA screening discussed. He chooses not to screen.     Senile cataract, unspecified    Chalazion of right upper eyelid    Blepharitis of both eyes    BPH (benign prostatic hypertrophy) with urinary obstruction    Hiatal hernia    Left inguinal hernia              Patient Instructions    LIQUID NITROGEN INSTRUCTIONS    Freezing with liquid nitrogen is accompanied by a stinging, burning sensation which usually lasts from 15 minutes up to several hours.     It is normal for a blister to form at the treatment site.  Occasionally this will be a blood blister.  It is usually best to leave the blister unopened.  The blister normally breaks in the first few days, but on the fingers it can last longer.  If it is painful, it can be opened after cleansing the area with soap and water followed by rubbing alcohol.  A needle that has been sterilized with a match and then wiped clean with rubbing  alcohol can be used to open the blister.  Wash hands before opening the blister.    The blister may form into a dry crust.  The crust should peel off in 2-4 weeks.  There may be some minimal redness after the crust falls off, but this should fade with time.     It is okay to wash, shampoo, shower or apply cosmetics to the area, but the area should not be rubbed as this can irritate it.  Pat dry with a soft towel.    Apply Vaseline to the treated area four times a day and it will heal faster.  Wash hands before applying Vaseline.    Possible side effects: risk of permanent pigment change (lighter or darker skin), persistence, blister, crusting, discomfort, infection.      Please call if any questions:    Dr. Feliciano  [facility]  [phone]          Elevated liver enzymes

## 2018-02-20 NOTE — PROGRESS NOTE ADULT - PROBLEM SELECTOR PLAN 9
.  - DVT: Redosing Coumadin when INR therapeutic.    Sean Martínez MD  PGY-1 | Internal Medicine  [phone] / [phone]

## 2018-02-20 NOTE — PROGRESS NOTE ADULT - PROBLEM SELECTOR PLAN 1
has underlying pulmonary fibrosis and respiratory failure precipitated by influenza: Did use bipap last night: Try to wean him off bipap: If necessary , use high flow oxygen  2/17 weaned off to Ventimask. Biapap and high flow as needed. will follow up CXR  2/18 wean off Ventimask to keep O2 sat greater than 90%  2/19: cont bipap prn : try to wean  him off bipap: if necessary, can use high flow oxygen  2/20 off bipap since 2/18, titrate down fio2 to keep o2 sat>=90%

## 2018-02-20 NOTE — PROGRESS NOTE ADULT - SUBJECTIVE AND OBJECTIVE BOX
SUBJECTIVE: Clinically unchanged in chair. Remains on iv  and norepi.  	  MEDICATIONS:  amiodarone    Tablet 100 milliGRAM(s) Oral daily  DOBUTamine Infusion 7.5 MICROgram(s)/kG/Min IV Continuous <Continuous>  midodrine 30 milliGRAM(s) Oral three times a day  norepinephrine Infusion 0.1 MICROgram(s)/kG/Min IV Continuous <Continuous>  ALBUTerol/ipratropium for Nebulization 3 milliLiter(s) Nebulizer every 4 hours  buDESOnide   90 MICROgram(s) Inhaler 2 Puff(s) Inhalation two times a day  docusate sodium 100 milliGRAM(s) Oral two times a day  polyethylene glycol 3350 17 Gram(s) Oral daily  senna 2 Tablet(s) Oral at bedtime  atorvastatin 80 milliGRAM(s) Oral at bedtime  dextrose 50% Injectable 12.5 Gram(s) IV Push once  dextrose 50% Injectable 25 Gram(s) IV Push once  dextrose 50% Injectable 25 Gram(s) IV Push once  dextrose Gel 1 Dose(s) Oral once PRN  dextrose Gel 1 Dose(s) Oral once PRN  finasteride 5 milliGRAM(s) Oral daily  glucagon  Injectable 1 milliGRAM(s) IntraMuscular once PRN  insulin lispro (HumaLOG) corrective regimen sliding scale   SubCutaneous three times a day before meals  insulin lispro (HumaLOG) corrective regimen sliding scale   SubCutaneous at bedtime  levothyroxine 75 MICROGram(s) Oral daily  aspirin enteric coated 81 milliGRAM(s) Oral daily  benzocaine 15 mG/menthol 3.6 mG Lozenge 1 Lozenge Oral every 6 hours PRN  chlorhexidine 4% Liquid 1 Application(s) Topical daily  dextrose 5%. 1000 milliLiter(s) IV Continuous <Continuous>  dextrose 5%. 1000 milliLiter(s) IV Continuous <Continuous>  influenza   Vaccine 0.5 milliLiter(s) IntraMuscular once  MIRACLE MOUTHWASH 30 milliLiter(s) Swish and Spit three times a day  Nephro-lynda 1 Tablet(s) Oral daily      REVIEW OF SYSTEMS:    CONSTITUTIONAL: No fever, weight loss, or fatigue  EYES: No eye pain, visual disturbances, or discharge  NECK: No pain or stiffness  RESPIRATORY: No cough, wheezing, chills or hemoptysis; No Shortness of Breath  CARDIOVASCULAR: No chest pain, palpitations, dizziness, or leg swelling  GASTROINTESTINAL: No abdominal or epigastric pain. No nausea, vomiting, or hematemesis; No diarrhea or constipation. No melena or hematochezia.  GENITOURINARY: No dysuria, frequency, hematuria, or incontinence  NEUROLOGICAL: No headaches, memory loss, loss of strength, numbness, or tremors  SKIN: No itching, burning, rashes, or lesions   LYMPH Nodes: No enlarged glands  MUSCULOSKELETAL: No joint pain or swelling; No muscle, back, or extremity pain  All other review of systems are negative.  	      PHYSICAL EXAM:  T(C): 36.7 (18 @ 07:55), Max: 36.7 (18 @ 06:00)  HR: 80 (18 @ 10:05) (71 - 95)  BP: 79/40 (18 @ 10:00) (72/43 - 109/75)  RR: 18 (18 @ 10:00) (13 - 21)  SpO2: 98% (18 @ 10:00) (80% - 100%)  Wt(kg): --  I&O's Summary    2018 07:  -  2018 07:00  --------------------------------------------------------  IN: 1598 mL / OUT: 2600 mL / NET: -1002 mL    2018 07:  -  2018 11:29  --------------------------------------------------------  IN: 30.9 mL / OUT: 0 mL / NET: 30.9 mL          PHYSICAL EXAM    Appearance: Normal	  HEENT:   Normal oral mucosa, PERRL, EOMI	  NECK: Soft and supple, No LAD, No JVD  Cardiovascular: irregular 2/6 shira healed icd scar  Respiratory: bilateral rhonchi  Gastrointestinal:  Soft, Non-tender, + BS	  Skin: No rashes, No ecchymoses, No cyanosis  Neurologic: Non-focal  Extremities: No clubbing, cyanosis or edema  Vascular: Peripheral pulses palpable 2+ bilaterally    LABS:	 	                      11.0   4.93  )-----------( 105      ( 2018 06:00 )             34.9         132<L>  |  93<L>  |  22  ----------------------------<  124<H>  3.7   |  22  |  5.28<H>    Ca    7.8<L>      2018 06:00  Phos  3.5       Mg     2.0         TPro  7.5  /  Alb  2.6<L>  /  TBili  0.9  /  DBili  x   /  AST  91<H>  /  ALT  55<H>  /  AlkPhos  203<H>

## 2018-02-20 NOTE — PROGRESS NOTE ADULT - SUBJECTIVE AND OBJECTIVE BOX
Follow-up Pulm Progress Note    No new respiratory events overnight.  Denies SOB/CP. 02 sat 98% on VM. last wore bipap on 2/18    Medications:  MEDICATIONS  (STANDING):  ALBUTerol/ipratropium for Nebulization 3 milliLiter(s) Nebulizer every 4 hours  amiodarone    Tablet 100 milliGRAM(s) Oral daily  aspirin enteric coated 81 milliGRAM(s) Oral daily  atorvastatin 80 milliGRAM(s) Oral at bedtime  buDESOnide   90 MICROgram(s) Inhaler 2 Puff(s) Inhalation two times a day  chlorhexidine 4% Liquid 1 Application(s) Topical daily  dextrose 5%. 1000 milliLiter(s) (50 mL/Hr) IV Continuous <Continuous>  dextrose 5%. 1000 milliLiter(s) (50 mL/Hr) IV Continuous <Continuous>  dextrose 50% Injectable 12.5 Gram(s) IV Push once  dextrose 50% Injectable 25 Gram(s) IV Push once  dextrose 50% Injectable 25 Gram(s) IV Push once  DOBUTamine Infusion 7.5 MICROgram(s)/kG/Min (16.942 mL/Hr) IV Continuous <Continuous>  docusate sodium 100 milliGRAM(s) Oral two times a day  finasteride 5 milliGRAM(s) Oral daily  influenza   Vaccine 0.5 milliLiter(s) IntraMuscular once  insulin lispro (HumaLOG) corrective regimen sliding scale   SubCutaneous three times a day before meals  insulin lispro (HumaLOG) corrective regimen sliding scale   SubCutaneous at bedtime  levothyroxine 75 MICROGram(s) Oral daily  midodrine 30 milliGRAM(s) Oral three times a day  MIRACLE MOUTHWASH 30 milliLiter(s) Swish and Spit three times a day  Nephro-lynda 1 Tablet(s) Oral daily  norepinephrine Infusion 0.1 MICROgram(s)/kG/Min (14.063 mL/Hr) IV Continuous <Continuous>  polyethylene glycol 3350 17 Gram(s) Oral daily  senna 2 Tablet(s) Oral at bedtime    MEDICATIONS  (PRN):  benzocaine 15 mG/menthol 3.6 mG Lozenge 1 Lozenge Oral every 6 hours PRN Sore Throat  dextrose Gel 1 Dose(s) Oral once PRN Blood Glucose LESS THAN 70 milliGRAM(s)/deciLiter  dextrose Gel 1 Dose(s) Oral once PRN Blood Glucose LESS THAN 70 milliGRAM(s)/deciliter  glucagon  Injectable 1 milliGRAM(s) IntraMuscular once PRN Glucose <70 milliGRAM(s)/deciLiter          Vital Signs Last 24 Hrs  T(C): 36.7 (20 Feb 2018 07:55), Max: 36.7 (20 Feb 2018 06:00)  T(F): 98 (20 Feb 2018 07:55), Max: 98.1 (20 Feb 2018 06:00)  HR: 77 (20 Feb 2018 15:53) (72 - 95)  BP: 93/64 (20 Feb 2018 14:00) (72/43 - 109/75)  BP(mean): 69 (20 Feb 2018 14:00) (47 - 78)  RR: 19 (20 Feb 2018 14:00) (13 - 21)  SpO2: 98% (20 Feb 2018 15:23) (80% - 100%)          02-19 @ 07:01  -  02-20 @ 07:00  --------------------------------------------------------  IN: 1598 mL / OUT: 2600 mL / NET: -1002 mL          LABS:                        11.0   4.93  )-----------( 105      ( 20 Feb 2018 06:00 )             34.9     02-20    132<L>  |  93<L>  |  22  ----------------------------<  124<H>  3.7   |  22  |  5.28<H>    Ca    7.8<L>      20 Feb 2018 06:00  Phos  3.5     02-20  Mg     2.0     02-20    TPro  7.5  /  Alb  2.6<L>  /  TBili  0.9  /  DBili  x   /  AST  91<H>  /  ALT  55<H>  /  AlkPhos  203<H>  02-20          CAPILLARY BLOOD GLUCOSE      POCT Blood Glucose.: 220 mg/dL (20 Feb 2018 12:41)    PT/INR - ( 20 Feb 2018 06:00 )   PT: 36.0 SEC;   INR: 3.17          PTT - ( 19 Feb 2018 05:40 )  PTT:82.0 SEC                    CULTURES: (if applicable)          Physical Examination:  PULM: fine crackles bilaterally, no significant sputum production  CVS: S1, S2 heard    RADIOLOGY REVIEWED  CXR: < from: Xray Chest 1 View- PORTABLE-Urgent (02.17.18 @ 18:38) >  IMPRESSION:  Redemonstrated left PICC with tip in SVC region. No kinking or   discontinuity along visualized course.    Remainder of the exam is unchanged.    < end of copied text >      CT chest:< from: CT Chest No Cont (06.05.17 @ 15:45) >  IMPRESSION:    Extensive pulmonary fibrosis.    Evidence of superimposed mild small airway disease.    < end of copied text >      TTE:  < from: Transthoracic Echocardiogram (10.18.17 @ 09:48) >  CONCLUSIONS:  1. Normal trileaflet aortic valve.  2. Severely dilated left atrium.  LA volume index = 56  cc/m2.  3. Moderate left ventricular enlargement.  4. Severe global left ventricular systolic dysfunction.  Flattening of the interventricular septum in both systole  and diastole is  consistent with right ventricular pressure  overload.  5. Right ventricular enlargement with decreased right  ventricular systolic function. A device wire is noted in  the right heart.  6. Normal tricuspid valve.  Moderate tricuspid  regurgitation.  7. Normal pulmonic valve. Mild pulmonic regurgitation.  Estimated pulmonary artery systolic pressure equals 55 mm  Hg, assuming right atrial pressure equals 10  mm Hg,  consistent with moderate pulmonary hypertension.  8. Agitated saline injection resulted in a large amount of  bubbles seen in the right heart. Although the exact amount  of cardiac cycles that occurred prior to the crossing of  the bubbles is unclear, the findings are suggestive of a  significant shunt. Consider MARGIE for further workup, if  clinically warrnated.    < end of copied text >

## 2018-02-20 NOTE — DISCHARGE NOTE ADULT - PATIENT PORTAL LINK FT
You can access the [facility] Patient Portal, offered by [facility], by registering with the following website: [email or web address]

## 2018-02-20 NOTE — DIETITIAN INITIAL EVALUATION ADULT. - OTHER INFO
Nutrition assessment initiated for critical care LOS. Pt. alert, oriented, reports no issues w/ PO intake , tolerating PO , follows w/ renal RDN @ [facility] .  Noted wt. loss as per the documents from previous admissions .  No edema per flow sheet .

## 2018-02-20 NOTE — DIETITIAN INITIAL EVALUATION ADULT. - PERTINENT MEDS FT
Dobutamine, Levophed, Amiodarone, Proamatine , Lipitor, Humalog, Colace, Nephro-lynda Senna, Proscar, Synthroid, Miralax

## 2018-02-20 NOTE — PROGRESS NOTE ADULT - PROBLEM SELECTOR PLAN 1
.  - Acute on chronic HoTN (baseline SBP 90s-100s). Profound drop in BP is likely due to volume depletion in the setting of Influenza. s/p 450cc 2/17.  - Goal SBP 85, per cardiology.  - Levophed gtt started 2/17, currently 0.1 mcg, trying to wean, however pt so far has not tolerated decreased rate.  - Midodrine dose increased to 30mg TID yesterday, which is pt's home dose  - HD today

## 2018-02-21 LAB
ALBUMIN SERPL ELPH-MCNC: 2.6 G/DL — LOW (ref 3.3–5)
ALP SERPL-CCNC: 194 U/L — HIGH (ref 40–120)
ALT FLD-CCNC: 55 U/L — HIGH (ref 4–41)
AST SERPL-CCNC: 81 U/L — HIGH (ref 4–40)
BILIRUB SERPL-MCNC: 0.9 MG/DL — SIGNIFICANT CHANGE UP (ref 0.2–1.2)
BUN SERPL-MCNC: 30 MG/DL — HIGH (ref 7–23)
CALCIUM SERPL-MCNC: 8 MG/DL — LOW (ref 8.4–10.5)
CHLORIDE SERPL-SCNC: 91 MMOL/L — LOW (ref 98–107)
CO2 SERPL-SCNC: 24 MMOL/L — SIGNIFICANT CHANGE UP (ref 22–31)
CREAT SERPL-MCNC: 6.31 MG/DL — HIGH (ref 0.5–1.3)
GLUCOSE SERPL-MCNC: 110 MG/DL — HIGH (ref 70–99)
HCT VFR BLD CALC: 32.8 % — LOW (ref 39–50)
HGB BLD-MCNC: 10.7 G/DL — LOW (ref 13–17)
INR BLD: 2.47 — HIGH (ref 0.88–1.17)
MAGNESIUM SERPL-MCNC: 2.4 MG/DL — SIGNIFICANT CHANGE UP (ref 1.6–2.6)
MCHC RBC-ENTMCNC: 31.6 PG — SIGNIFICANT CHANGE UP (ref 27–34)
MCHC RBC-ENTMCNC: 32.6 % — SIGNIFICANT CHANGE UP (ref 32–36)
MCV RBC AUTO: 96.8 FL — SIGNIFICANT CHANGE UP (ref 80–100)
NRBC # FLD: 0 — SIGNIFICANT CHANGE UP
PHOSPHATE SERPL-MCNC: 4.3 MG/DL — SIGNIFICANT CHANGE UP (ref 2.5–4.5)
PLATELET # BLD AUTO: 104 K/UL — LOW (ref 150–400)
PMV BLD: 12.2 FL — SIGNIFICANT CHANGE UP (ref 7–13)
POTASSIUM SERPL-MCNC: 4 MMOL/L — SIGNIFICANT CHANGE UP (ref 3.5–5.3)
POTASSIUM SERPL-SCNC: 4 MMOL/L — SIGNIFICANT CHANGE UP (ref 3.5–5.3)
PROT SERPL-MCNC: 7.4 G/DL — SIGNIFICANT CHANGE UP (ref 6–8.3)
PROTHROM AB SERPL-ACNC: 28.9 SEC — HIGH (ref 9.8–13.1)
RBC # BLD: 3.39 M/UL — LOW (ref 4.2–5.8)
RBC # FLD: 16.2 % — HIGH (ref 10.3–14.5)
SODIUM SERPL-SCNC: 130 MMOL/L — LOW (ref 135–145)
T3 SERPL-MCNC: 57.6 NG/DL — LOW (ref 80–200)
T4 AB SER-ACNC: 8.41 UG/DL — SIGNIFICANT CHANGE UP (ref 5.1–13)
T4 FREE SERPL-MCNC: 1.09 NG/DL — SIGNIFICANT CHANGE UP (ref 0.9–1.8)
TSH SERPL-MCNC: 6.4 UIU/ML — HIGH (ref 0.27–4.2)
WBC # BLD: 5.77 K/UL — SIGNIFICANT CHANGE UP (ref 3.8–10.5)
WBC # FLD AUTO: 5.77 K/UL — SIGNIFICANT CHANGE UP (ref 3.8–10.5)

## 2018-02-21 PROCEDURE — 93010 ELECTROCARDIOGRAM REPORT: CPT

## 2018-02-21 RX ORDER — WARFARIN SODIUM 2.5 MG/1
3 TABLET ORAL ONCE
Qty: 0 | Refills: 0 | Status: COMPLETED | OUTPATIENT
Start: 2018-02-21 | End: 2018-02-21

## 2018-02-21 RX ADMIN — DIPHENHYDRAMINE HYDROCHLORIDE AND LIDOCAINE HYDROCHLORIDE AND ALUMINUM HYDROXIDE AND MAGNESIUM HYDRO 30 MILLILITER(S): KIT at 05:50

## 2018-02-21 RX ADMIN — MIDODRINE HYDROCHLORIDE 30 MILLIGRAM(S): 2.5 TABLET ORAL at 05:49

## 2018-02-21 RX ADMIN — WARFARIN SODIUM 3 MILLIGRAM(S): 2.5 TABLET ORAL at 18:38

## 2018-02-21 RX ADMIN — Medication 2 PUFF(S): at 11:16

## 2018-02-21 RX ADMIN — Medication 100 MILLIGRAM(S): at 18:37

## 2018-02-21 RX ADMIN — Medication 3 MILLILITER(S): at 05:40

## 2018-02-21 RX ADMIN — Medication 3 MILLILITER(S): at 16:36

## 2018-02-21 RX ADMIN — AMIODARONE HYDROCHLORIDE 100 MILLIGRAM(S): 400 TABLET ORAL at 05:49

## 2018-02-21 RX ADMIN — Medication 81 MILLIGRAM(S): at 13:30

## 2018-02-21 RX ADMIN — Medication 2 PUFF(S): at 21:47

## 2018-02-21 RX ADMIN — Medication 3 MILLILITER(S): at 21:47

## 2018-02-21 RX ADMIN — CHLORHEXIDINE GLUCONATE 1 APPLICATION(S): 213 SOLUTION TOPICAL at 13:29

## 2018-02-21 RX ADMIN — ATORVASTATIN CALCIUM 80 MILLIGRAM(S): 80 TABLET, FILM COATED ORAL at 22:36

## 2018-02-21 RX ADMIN — DIPHENHYDRAMINE HYDROCHLORIDE AND LIDOCAINE HYDROCHLORIDE AND ALUMINUM HYDROXIDE AND MAGNESIUM HYDRO 30 MILLILITER(S): KIT at 22:39

## 2018-02-21 RX ADMIN — Medication 100 MILLIGRAM(S): at 05:49

## 2018-02-21 RX ADMIN — Medication 14.06 MICROGRAM(S)/KG/MIN: at 13:33

## 2018-02-21 RX ADMIN — MIDODRINE HYDROCHLORIDE 30 MILLIGRAM(S): 2.5 TABLET ORAL at 13:34

## 2018-02-21 RX ADMIN — Medication 1 TABLET(S): at 13:31

## 2018-02-21 RX ADMIN — Medication 3 MILLILITER(S): at 11:14

## 2018-02-21 RX ADMIN — Medication 75 MICROGRAM(S): at 05:49

## 2018-02-21 RX ADMIN — MIDODRINE HYDROCHLORIDE 30 MILLIGRAM(S): 2.5 TABLET ORAL at 22:36

## 2018-02-21 RX ADMIN — POLYETHYLENE GLYCOL 3350 17 GRAM(S): 17 POWDER, FOR SOLUTION ORAL at 19:09

## 2018-02-21 RX ADMIN — Medication 2: at 13:28

## 2018-02-21 RX ADMIN — SENNA PLUS 2 TABLET(S): 8.6 TABLET ORAL at 22:37

## 2018-02-21 RX ADMIN — FINASTERIDE 5 MILLIGRAM(S): 5 TABLET, FILM COATED ORAL at 13:32

## 2018-02-21 RX ADMIN — Medication 16.94 MICROGRAM(S)/KG/MIN: at 13:32

## 2018-02-21 NOTE — PROGRESS NOTE ADULT - SUBJECTIVE AND OBJECTIVE BOX
Follow-up Pulm Progress Note    No new respiratory events overnight.  Denies SOB/CP. o2 sat 97% on fm 50%. s/p HD this am.    Medications:  MEDICATIONS  (STANDING):  ALBUTerol/ipratropium for Nebulization 3 milliLiter(s) Nebulizer every 6 hours  amiodarone    Tablet 100 milliGRAM(s) Oral daily  aspirin enteric coated 81 milliGRAM(s) Oral daily  atorvastatin 80 milliGRAM(s) Oral at bedtime  buDESOnide   90 MICROgram(s) Inhaler 2 Puff(s) Inhalation two times a day  chlorhexidine 4% Liquid 1 Application(s) Topical daily  dextrose 5%. 1000 milliLiter(s) (50 mL/Hr) IV Continuous <Continuous>  dextrose 5%. 1000 milliLiter(s) (50 mL/Hr) IV Continuous <Continuous>  dextrose 50% Injectable 12.5 Gram(s) IV Push once  dextrose 50% Injectable 25 Gram(s) IV Push once  dextrose 50% Injectable 25 Gram(s) IV Push once  DOBUTamine Infusion 7.5 MICROgram(s)/kG/Min (16.942 mL/Hr) IV Continuous <Continuous>  docusate sodium 100 milliGRAM(s) Oral two times a day  finasteride 5 milliGRAM(s) Oral daily  influenza   Vaccine 0.5 milliLiter(s) IntraMuscular once  insulin lispro (HumaLOG) corrective regimen sliding scale   SubCutaneous three times a day before meals  insulin lispro (HumaLOG) corrective regimen sliding scale   SubCutaneous at bedtime  levothyroxine 75 MICROGram(s) Oral daily  midodrine 30 milliGRAM(s) Oral three times a day  MIRACLE MOUTHWASH 30 milliLiter(s) Swish and Spit three times a day  Nephro-lynda 1 Tablet(s) Oral daily  norepinephrine Infusion 0.1 MICROgram(s)/kG/Min (14.063 mL/Hr) IV Continuous <Continuous>  polyethylene glycol 3350 17 Gram(s) Oral daily  senna 2 Tablet(s) Oral at bedtime  warfarin 3 milliGRAM(s) Oral once    MEDICATIONS  (PRN):  benzocaine 15 mG/menthol 3.6 mG Lozenge 1 Lozenge Oral every 6 hours PRN Sore Throat  dextrose Gel 1 Dose(s) Oral once PRN Blood Glucose LESS THAN 70 milliGRAM(s)/deciLiter  dextrose Gel 1 Dose(s) Oral once PRN Blood Glucose LESS THAN 70 milliGRAM(s)/deciliter  glucagon  Injectable 1 milliGRAM(s) IntraMuscular once PRN Glucose <70 milliGRAM(s)/deciLiter          Vital Signs Last 24 Hrs  T(C): 36.6 (21 Feb 2018 10:53), Max: 37.1 (21 Feb 2018 04:00)  T(F): 97.8 (21 Feb 2018 10:53), Max: 98.8 (21 Feb 2018 04:00)  HR: 84 (21 Feb 2018 11:18) (73 - 91)  BP: 86/61 (21 Feb 2018 10:53) (67/50 - 113/69)  BP(mean): 80 (21 Feb 2018 09:00) (50 - 97)  RR: 21 (21 Feb 2018 10:53) (10 - 23)  SpO2: 94% (21 Feb 2018 11:18) (91% - 100%)          02-20 @ 07:01  -  02-21 @ 07:00  --------------------------------------------------------  IN: 1133.8 mL / OUT: 0 mL / NET: 1133.8 mL          LABS:                        10.7   5.77  )-----------( 104      ( 21 Feb 2018 05:40 )             32.8     02-21    130<L>  |  91<L>  |  30<H>  ----------------------------<  110<H>  4.0   |  24  |  6.31<H>    Ca    8.0<L>      21 Feb 2018 05:40  Phos  4.3     02-21  Mg     2.4     02-21    TPro  7.4  /  Alb  2.6<L>  /  TBili  0.9  /  DBili  x   /  AST  81<H>  /  ALT  55<H>  /  AlkPhos  194<H>  02-21          CAPILLARY BLOOD GLUCOSE      POCT Blood Glucose.: 203 mg/dL (21 Feb 2018 13:16)    PT/INR - ( 21 Feb 2018 05:40 )   PT: 28.9 SEC;   INR: 2.47                              CULTURES: (if applicable)          Physical Examination:  PULM: decreased bs bilaterally, no significant sputum production  CVS: S1, S2 heard    RADIOLOGY REVIEWED  CXR: < from: Xray Chest 1 View- PORTABLE-Urgent (02.17.18 @ 18:38) >  MPRESSION:  Redemonstrated left PICC with tip in SVC region. No kinking or   discontinuity along visualized course.    Remainder of the exam is unchanged.    < end of copied text >    TTE:  < from: Transthoracic Echocardiogram (10.18.17 @ 09:48) >  CONCLUSIONS:  1. Normal trileaflet aortic valve.  2. Severely dilated left atrium.  LA volume index = 56  cc/m2.  3. Moderate left ventricular enlargement.  4. Severe global left ventricular systolic dysfunction.  Flattening of the interventricular septum in both systole  and diastole is  consistent with right ventricular pressure  overload.  5. Right ventricular enlargement with decreased right  ventricular systolic function. A device wire is noted in  the right heart.  6. Normal tricuspid valve.  Moderate tricuspid  regurgitation.  7. Normal pulmonic valve. Mild pulmonic regurgitation.  Estimated pulmonary artery systolic pressure equals 55 mm  Hg, assuming right atrial pressure equals 10  mm Hg,  consistent with moderate pulmonary hypertension.  8. Agitated saline injection resulted in a large amount of  bubbles seen in the right heart. Although the exact amount  of cardiac cycles that occurred prior to the crossing of  the bubbles is unclear, the findings are suggestive of a  significant shunt. Consider MARGIE for further workup, if  clinically warrnated.    < end of copied text >

## 2018-02-21 NOTE — PROGRESS NOTE ADULT - PROBLEM SELECTOR PLAN 1
has underlying pulmonary fibrosis and respiratory failure precipitated by influenza: Did use bipap last night: Try to wean him off bipap: If necessary , use high flow oxygen  2/17 weaned off to Ventimask. Biapap and high flow as needed. will follow up CXR  2/18 wean off Ventimask to keep O2 sat greater than 90%  2/19: cont bipap prn : try to wean  him off bipap: if necessary, can use high flow oxygen  2/20 off bipap since 2/18, titrate down fio2 to keep o2 sat>=90%  2/21 remains off bipap, tirtate down fio2 to keep o2 sat >=90%, consdiser trial of 5lnc to keep o2 sat >=90%

## 2018-02-21 NOTE — PROGRESS NOTE ADULT - SUBJECTIVE AND OBJECTIVE BOX
Presently undergoing HD   On a VM with O2 Sat 97%  Still has a cough  On Norepi 0.07 mcg/kg/min  BP 88/60  HR 87 (AF)  Bilateral crackles  Irregular rhythm  No edema    WBC 5.77  Hgb 10.7  Hct 32.8  Plt  104K  INR 2.47    Imp: Persistent hypotension on Norepi.  The BP is at his baseline and would just stop the drip and see if there is a drop in BP  Continue Dobutamine at the present dose.  Decrease the O2 delivery to maintain an O2 Sat > 93%

## 2018-02-21 NOTE — PROGRESS NOTE ADULT - PROBLEM SELECTOR PLAN 1
Maintenance HD schedule MWF.  Pt tolerating HD today. Low UF goal, as pt hypotensive on pressors.   Electrolytes acceptable. chronic volume overload 2/2 severe CMP  Continue midodrine. Levophed titration as per primary team.   c/w renal diet, fluid restrictions

## 2018-02-21 NOTE — PROGRESS NOTE ADULT - SUBJECTIVE AND OBJECTIVE BOX
PATIENT: KURT STRONG   AGE: 61yo   GENDER: Male  ALLERGIES: No Known Allergies    CHIEF COMPLAINT:   SOB x few hours (20 Feb 2018 18:05)    INTERVAL EVENTS:  O/N BP 84-98 / 45-64, levo gtt down from 0.9 to 0.5 mcg. Given Reglan x1 for nausea, no vomiting.      MEDICATIONS  ALBUTerol/ipratropium for Nebulization 3 milliLiter(s) Nebulizer every 6 hours  amiodarone    Tablet 100 milliGRAM(s) Oral daily  aspirin enteric coated 81 milliGRAM(s) Oral daily  atorvastatin 80 milliGRAM(s) Oral at bedtime  benzocaine 15 mG/menthol 3.6 mG Lozenge 1 Lozenge Oral every 6 hours PRN Sore Throat  buDESOnide   90 MICROgram(s) Inhaler 2 Puff(s) Inhalation two times a day  chlorhexidine 4% Liquid 1 Application(s) Topical daily  dextrose 5%. 1000 milliLiter(s) (50 mL/Hr) IV Continuous <Continuous>  dextrose 5%. 1000 milliLiter(s) (50 mL/Hr) IV Continuous <Continuous>  dextrose 50% Injectable 12.5 Gram(s) IV Push once  dextrose 50% Injectable 25 Gram(s) IV Push once  dextrose 50% Injectable 25 Gram(s) IV Push once  dextrose Gel 1 Dose(s) Oral once PRN Blood Glucose LESS THAN 70 milliGRAM(s)/deciLiter  dextrose Gel 1 Dose(s) Oral once PRN Blood Glucose LESS THAN 70 milliGRAM(s)/deciliter  DOBUTamine Infusion 7.5 MICROgram(s)/kG/Min (16.942 mL/Hr) IV Continuous <Continuous>  docusate sodium 100 milliGRAM(s) Oral two times a day  finasteride 5 milliGRAM(s) Oral daily  glucagon  Injectable 1 milliGRAM(s) IntraMuscular once PRN Glucose <70 milliGRAM(s)/deciLiter  influenza   Vaccine 0.5 milliLiter(s) IntraMuscular once  insulin lispro (HumaLOG) corrective regimen sliding scale   SubCutaneous three times a day before meals  insulin lispro (HumaLOG) corrective regimen sliding scale   SubCutaneous at bedtime  levothyroxine 75 MICROGram(s) Oral daily  midodrine 30 milliGRAM(s) Oral three times a day  MIRACLE MOUTHWASH 30 milliLiter(s) Swish and Spit three times a day  Nephro-lynda 1 Tablet(s) Oral daily  norepinephrine Infusion 0.1 MICROgram(s)/kG/Min (14.063 mL/Hr) IV Continuous <Continuous>  polyethylene glycol 3350 17 Gram(s) Oral daily  senna 2 Tablet(s) Oral at bedtime      VITAL SIGNS (last 24 hrs):  ICU Vital Signs Last 24 Hrs  T(C): 36.5 (21 Feb 2018 07:15), Max: 37.1 (21 Feb 2018 04:00)  T(F): 97.7 (21 Feb 2018 07:15), Max: 98.8 (21 Feb 2018 04:00)  HR: 91 (21 Feb 2018 07:41) (73 - 95)  BP: 88/60 (21 Feb 2018 07:15) (67/50 - 110/94)  BP(mean): 62 (21 Feb 2018 06:00) (50 - 97)  ABP: --  ABP(mean): --  RR: 19 (21 Feb 2018 07:15) (10 - 23)  SpO2: 99% (21 Feb 2018 07:41) (91% - 100%)      PHYSICAL EXAM:  GENERAL: NAD  HEENT:  AT/NC; EOMI, PERRLA, conjunctiva and sclera clear; hearing grossly intact  NECK: Supple, non tender  CHEST/LUNG: CTAB, no wheezes, ronchi, rales  HEART: Normal rate, regular rhythm; No murmurs, rubs, or gallops; No JVD or peripheral edema  ABDOMEN: soft, NTTP, nondistended, normoactive BS  MSK/EXTREMITIES:  Grossly normal strength, movement, tone, and ROM; Palpable peripheral pulses; No clubbing or cyanosis  PSYCH: AAOx4  NEUROLOGY: Non focal   SKIN: No rashes or lesions      WEIGHT:   Admission Weight (kg): 75.3 (02-18-18)  Daily     I/O SUMMARY:    02-20-18 @ 07:01  -  02-21-18 @ 07:00  --------------------------------------------------------  IN: 1133.8 mL / OUT: 0 mL / NET: 1133.8 mL        LABS:                         10.7   5.77  >-----< 104           ( 02-21-18 @ 05:40 )             32.8       130  |  91  |   30  ----------------------< 110    (02-21-18 @ 05:40)     4.0  |  24  |  6.31    Anion Gap: --    Ca   8.0   (02-21-18 @ 05:40)  Mg   2.4   (02-21-18 @ 05:40)  Phos 4.3   (02-21-18 @ 05:40)       TP 8.0     |  AST 2.6    -------------------------     Alb x     |  ALT x               (02-21-18 @ 05:40)  -------------------------     T-bili 2.6  |  AlkPh 194    D-bili x   COAGULATION STUDIES:     aPTT  x        (02-21-18 @ 05:40)     PT     28.9 SEC    (02-21-18 @ 05:40)     INR    2.47          (02-21-18 @ 05:40), COAGULATION STUDIES:     aPTT  x        (02-20-18 @ 06:00)     PT     36.0 SEC    (02-20-18 @ 06:00)     INR    3.17          (02-20-18 @ 06:00)     POCT Blood Glucose.: 116 mg/dL (02-20-18 @ 21:59)  POCT Blood Glucose.: 99 mg/dL (02-20-18 @ 16:45)  POCT Blood Glucose.: 220 mg/dL (02-20-18 @ 12:41)  POCT Blood Glucose.: 108 mg/dL (02-20-18 @ 08:42)        MICROBIOLOGY:      ECHO:       RADIOLOGY & ADDITIONAL TESTS: PATIENT: KURT STRONG   AGE: 63yo   GENDER: Male  ALLERGIES: No Known Allergies    CHIEF COMPLAINT:   SOB x few hours (20 Feb 2018 18:05)    INTERVAL EVENTS:  O/N BP 84-98 / 45-64, levo gtt down from 0.9 to 0.5 mcg. Given Reglan x1 for nausea, no vomiting. HD today, removing 0.5L, increased levo to 0.07 during HD. Pt denies n/v, CP, SOB, dizziness.      MEDICATIONS  ALBUTerol/ipratropium for Nebulization 3 milliLiter(s) Nebulizer every 6 hours  amiodarone    Tablet 100 milliGRAM(s) Oral daily  aspirin enteric coated 81 milliGRAM(s) Oral daily  atorvastatin 80 milliGRAM(s) Oral at bedtime  benzocaine 15 mG/menthol 3.6 mG Lozenge 1 Lozenge Oral every 6 hours PRN Sore Throat  buDESOnide   90 MICROgram(s) Inhaler 2 Puff(s) Inhalation two times a day  chlorhexidine 4% Liquid 1 Application(s) Topical daily  dextrose 5%. 1000 milliLiter(s) (50 mL/Hr) IV Continuous <Continuous>  dextrose 5%. 1000 milliLiter(s) (50 mL/Hr) IV Continuous <Continuous>  dextrose 50% Injectable 12.5 Gram(s) IV Push once  dextrose 50% Injectable 25 Gram(s) IV Push once  dextrose 50% Injectable 25 Gram(s) IV Push once  dextrose Gel 1 Dose(s) Oral once PRN Blood Glucose LESS THAN 70 milliGRAM(s)/deciLiter  dextrose Gel 1 Dose(s) Oral once PRN Blood Glucose LESS THAN 70 milliGRAM(s)/deciliter  DOBUTamine Infusion 7.5 MICROgram(s)/kG/Min (16.942 mL/Hr) IV Continuous <Continuous>  docusate sodium 100 milliGRAM(s) Oral two times a day  finasteride 5 milliGRAM(s) Oral daily  glucagon  Injectable 1 milliGRAM(s) IntraMuscular once PRN Glucose <70 milliGRAM(s)/deciLiter  influenza   Vaccine 0.5 milliLiter(s) IntraMuscular once  insulin lispro (HumaLOG) corrective regimen sliding scale   SubCutaneous three times a day before meals  insulin lispro (HumaLOG) corrective regimen sliding scale   SubCutaneous at bedtime  levothyroxine 75 MICROGram(s) Oral daily  midodrine 30 milliGRAM(s) Oral three times a day  MIRACLE MOUTHWASH 30 milliLiter(s) Swish and Spit three times a day  Nephro-lynda 1 Tablet(s) Oral daily  norepinephrine Infusion 0.1 MICROgram(s)/kG/Min (14.063 mL/Hr) IV Continuous <Continuous>  polyethylene glycol 3350 17 Gram(s) Oral daily  senna 2 Tablet(s) Oral at bedtime      VITAL SIGNS (last 24 hrs):  ICU Vital Signs Last 24 Hrs  T(C): 36.5 (21 Feb 2018 07:15), Max: 37.1 (21 Feb 2018 04:00)  T(F): 97.7 (21 Feb 2018 07:15), Max: 98.8 (21 Feb 2018 04:00)  HR: 91 (21 Feb 2018 07:41) (73 - 95)  BP: 88/60 (21 Feb 2018 07:15) (67/50 - 110/94)  BP(mean): 62 (21 Feb 2018 06:00) (50 - 97)  ABP: --  ABP(mean): --  RR: 19 (21 Feb 2018 07:15) (10 - 23)  SpO2: 99% (21 Feb 2018 07:41) (91% - 100%)      PHYSICAL EXAM:  GENERAL: appears somewhat improved today, though still ill-appearing  HEENT:  AT/NC; EOMI, PERRLA, conjunctiva and sclera clear; hearing grossly intact  NECK: Supple, non tender  CHEST/LUNG: Course crackles throughout; saturating >95% on venturi mask 50% FiO2  HEART: Normal rate, regular rhythm; No murmurs, rubs, or gallops; JVD to mandible, 1+ B/L LE edema  ABDOMEN: soft, NTTP, nondistended, normoactive BS  MSK/EXTREMITIES:  Grossly normal strength, movement, tone, and ROM; Palpable peripheral pulses    WEIGHT:   Admission Weight (kg): 75.3 (02-18-18)  Daily     I/O SUMMARY:    02-20-18 @ 07:01  -  02-21-18 @ 07:00  --------------------------------------------------------  IN: 1133.8 mL / OUT: 0 mL / NET: 1133.8 mL        LABS:                         10.7   5.77  >-----< 104           ( 02-21-18 @ 05:40 )             32.8       130  |  91  |   30  ----------------------< 110    (02-21-18 @ 05:40)     4.0  |  24  |  6.31    Anion Gap: --    Ca   8.0   (02-21-18 @ 05:40)  Mg   2.4   (02-21-18 @ 05:40)  Phos 4.3   (02-21-18 @ 05:40)       TP 8.0     |  AST 2.6    -------------------------     Alb x     |  ALT x               (02-21-18 @ 05:40)  -------------------------     T-bili 2.6  |  AlkPh 194    D-bili x   COAGULATION STUDIES:     aPTT  x        (02-21-18 @ 05:40)     PT     28.9 SEC    (02-21-18 @ 05:40)     INR    2.47          (02-21-18 @ 05:40), COAGULATION STUDIES:     aPTT  x        (02-20-18 @ 06:00)     PT     36.0 SEC    (02-20-18 @ 06:00)     INR    3.17          (02-20-18 @ 06:00)     POCT Blood Glucose.: 116 mg/dL (02-20-18 @ 21:59)  POCT Blood Glucose.: 99 mg/dL (02-20-18 @ 16:45)  POCT Blood Glucose.: 220 mg/dL (02-20-18 @ 12:41)  POCT Blood Glucose.: 108 mg/dL (02-20-18 @ 08:42)        MICROBIOLOGY:      ECHO:       RADIOLOGY & ADDITIONAL TESTS: PATIENT: KURT STRONG   AGE: 63yo   GENDER: Male  ALLERGIES: No Known Allergies    CHIEF COMPLAINT:   SOB x few hours (20 Feb 2018 18:05)    INTERVAL EVENTS:  O/N BP 84-98 / 45-64, levo gtt down from 0.9 to 0.5 mcg. Given Reglan x1 for nausea, no vomiting. HD today, removing 0.5L, increased levo to 0.07 during HD. Pt denies n/v, CP, SOB, dizziness. Attempted to discuss palliative care, pt adamantly refuses.      MEDICATIONS  ALBUTerol/ipratropium for Nebulization 3 milliLiter(s) Nebulizer every 6 hours  amiodarone    Tablet 100 milliGRAM(s) Oral daily  aspirin enteric coated 81 milliGRAM(s) Oral daily  atorvastatin 80 milliGRAM(s) Oral at bedtime  benzocaine 15 mG/menthol 3.6 mG Lozenge 1 Lozenge Oral every 6 hours PRN Sore Throat  buDESOnide   90 MICROgram(s) Inhaler 2 Puff(s) Inhalation two times a day  chlorhexidine 4% Liquid 1 Application(s) Topical daily  dextrose 5%. 1000 milliLiter(s) (50 mL/Hr) IV Continuous <Continuous>  dextrose 5%. 1000 milliLiter(s) (50 mL/Hr) IV Continuous <Continuous>  dextrose 50% Injectable 12.5 Gram(s) IV Push once  dextrose 50% Injectable 25 Gram(s) IV Push once  dextrose 50% Injectable 25 Gram(s) IV Push once  dextrose Gel 1 Dose(s) Oral once PRN Blood Glucose LESS THAN 70 milliGRAM(s)/deciLiter  dextrose Gel 1 Dose(s) Oral once PRN Blood Glucose LESS THAN 70 milliGRAM(s)/deciliter  DOBUTamine Infusion 7.5 MICROgram(s)/kG/Min (16.942 mL/Hr) IV Continuous <Continuous>  docusate sodium 100 milliGRAM(s) Oral two times a day  finasteride 5 milliGRAM(s) Oral daily  glucagon  Injectable 1 milliGRAM(s) IntraMuscular once PRN Glucose <70 milliGRAM(s)/deciLiter  influenza   Vaccine 0.5 milliLiter(s) IntraMuscular once  insulin lispro (HumaLOG) corrective regimen sliding scale   SubCutaneous three times a day before meals  insulin lispro (HumaLOG) corrective regimen sliding scale   SubCutaneous at bedtime  levothyroxine 75 MICROGram(s) Oral daily  midodrine 30 milliGRAM(s) Oral three times a day  MIRACLE MOUTHWASH 30 milliLiter(s) Swish and Spit three times a day  Nephro-lynda 1 Tablet(s) Oral daily  norepinephrine Infusion 0.1 MICROgram(s)/kG/Min (14.063 mL/Hr) IV Continuous <Continuous>  polyethylene glycol 3350 17 Gram(s) Oral daily  senna 2 Tablet(s) Oral at bedtime      VITAL SIGNS (last 24 hrs):  ICU Vital Signs Last 24 Hrs  T(C): 36.5 (21 Feb 2018 07:15), Max: 37.1 (21 Feb 2018 04:00)  T(F): 97.7 (21 Feb 2018 07:15), Max: 98.8 (21 Feb 2018 04:00)  HR: 91 (21 Feb 2018 07:41) (73 - 95)  BP: 88/60 (21 Feb 2018 07:15) (67/50 - 110/94)  BP(mean): 62 (21 Feb 2018 06:00) (50 - 97)  ABP: --  ABP(mean): --  RR: 19 (21 Feb 2018 07:15) (10 - 23)  SpO2: 99% (21 Feb 2018 07:41) (91% - 100%)      PHYSICAL EXAM:  GENERAL: appears somewhat improved today, though still ill-appearing  HEENT:  AT/NC; EOMI, PERRLA, conjunctiva and sclera clear; hearing grossly intact  NECK: Supple, non tender  CHEST/LUNG: Course crackles throughout; saturating >95% on venturi mask 50% FiO2  HEART: Normal rate, regular rhythm; No murmurs, rubs, or gallops; JVD to mandible, 1+ B/L LE edema  ABDOMEN: soft, NTTP, nondistended, normoactive BS  MSK/EXTREMITIES:  Grossly normal strength, movement, tone, and ROM; Palpable peripheral pulses    WEIGHT:   Admission Weight (kg): 75.3 (02-18-18)  Daily     I/O SUMMARY:    02-20-18 @ 07:01  -  02-21-18 @ 07:00  --------------------------------------------------------  IN: 1133.8 mL / OUT: 0 mL / NET: 1133.8 mL        LABS:                         10.7   5.77  >-----< 104           ( 02-21-18 @ 05:40 )             32.8       130  |  91  |   30  ----------------------< 110    (02-21-18 @ 05:40)     4.0  |  24  |  6.31    Anion Gap: --    Ca   8.0   (02-21-18 @ 05:40)  Mg   2.4   (02-21-18 @ 05:40)  Phos 4.3   (02-21-18 @ 05:40)       TP 8.0     |  AST 2.6    -------------------------     Alb x     |  ALT x               (02-21-18 @ 05:40)  -------------------------     T-bili 2.6  |  AlkPh 194    D-bili x   COAGULATION STUDIES:     aPTT  x        (02-21-18 @ 05:40)     PT     28.9 SEC    (02-21-18 @ 05:40)     INR    2.47          (02-21-18 @ 05:40), COAGULATION STUDIES:     aPTT  x        (02-20-18 @ 06:00)     PT     36.0 SEC    (02-20-18 @ 06:00)     INR    3.17          (02-20-18 @ 06:00)     POCT Blood Glucose.: 116 mg/dL (02-20-18 @ 21:59)  POCT Blood Glucose.: 99 mg/dL (02-20-18 @ 16:45)  POCT Blood Glucose.: 220 mg/dL (02-20-18 @ 12:41)  POCT Blood Glucose.: 108 mg/dL (02-20-18 @ 08:42)        MICROBIOLOGY:      ECHO:       RADIOLOGY & ADDITIONAL TESTS:

## 2018-02-21 NOTE — PROGRESS NOTE ADULT - PROBLEM SELECTOR PLAN 2
.  - TTE Oct 2017: EF 21%, severely dilated LA, mod LV enlargement, Severe global LV syst dysfxn, RV enlargement w/ decreased RV syst fxn, mod TR, mild MT.  - increased pulmonary edema on 2/17 PM CXR compared to 2/17 AM CXR  - JVD to mandible, B/L crackles on exam  - c/w dobutamine gtt  - HD today -- per Dr. Flores (pt's nephrologist), pt is chronically fluid overloaded 2/2 poor volume restriction adherence

## 2018-02-21 NOTE — PROGRESS NOTE ADULT - PROBLEM SELECTOR PLAN 9
.  - DVT: Redosing Coumadin when INR therapeutic.    Sean Martínez MD  PGY-1 | Internal Medicine  [phone] / [phone] .  - DVT: Coumadin, goal INR 2-3.  - DISPO: likely home when off pressors; pt refuses palliative/hospice consult.     Sean Martínez MD  PGY-1 | Internal Medicine  [phone] / [phone]

## 2018-02-22 LAB
ALBUMIN SERPL ELPH-MCNC: 2.5 G/DL — LOW (ref 3.3–5)
ALP SERPL-CCNC: 193 U/L — HIGH (ref 40–120)
ALT FLD-CCNC: 51 U/L — HIGH (ref 4–41)
AST SERPL-CCNC: 78 U/L — HIGH (ref 4–40)
BASE EXCESS BLDV CALC-SCNC: 2.7 MMOL/L — SIGNIFICANT CHANGE UP
BILIRUB SERPL-MCNC: 0.9 MG/DL — SIGNIFICANT CHANGE UP (ref 0.2–1.2)
BLOOD GAS VENOUS - CREATININE: 5.48 MG/DL — HIGH (ref 0.5–1.3)
BUN SERPL-MCNC: 25 MG/DL — HIGH (ref 7–23)
CALCIUM SERPL-MCNC: 8.2 MG/DL — LOW (ref 8.4–10.5)
CHLORIDE BLDV-SCNC: 96 MMOL/L — SIGNIFICANT CHANGE UP (ref 96–108)
CHLORIDE SERPL-SCNC: 95 MMOL/L — LOW (ref 98–107)
CO2 SERPL-SCNC: 24 MMOL/L — SIGNIFICANT CHANGE UP (ref 22–31)
CREAT SERPL-MCNC: 5.48 MG/DL — HIGH (ref 0.5–1.3)
GAS PNL BLDV: 132 MMOL/L — LOW (ref 136–146)
GLUCOSE BLDV-MCNC: 123 — HIGH (ref 70–99)
GLUCOSE SERPL-MCNC: 112 MG/DL — HIGH (ref 70–99)
HCO3 BLDV-SCNC: 25 MMOL/L — SIGNIFICANT CHANGE UP (ref 20–27)
HCT VFR BLD CALC: 34.2 % — LOW (ref 39–50)
HCT VFR BLDV CALC: 33.2 % — LOW (ref 39–51)
HGB BLD-MCNC: 10.8 G/DL — LOW (ref 13–17)
HGB BLDV-MCNC: 10.7 G/DL — LOW (ref 13–17)
INR BLD: 2.08 — HIGH (ref 0.88–1.17)
LACTATE BLDV-MCNC: 0.9 MMOL/L — SIGNIFICANT CHANGE UP (ref 0.5–2)
MAGNESIUM SERPL-MCNC: 2 MG/DL — SIGNIFICANT CHANGE UP (ref 1.6–2.6)
MCHC RBC-ENTMCNC: 30.3 PG — SIGNIFICANT CHANGE UP (ref 27–34)
MCHC RBC-ENTMCNC: 31.6 % — LOW (ref 32–36)
MCV RBC AUTO: 96.1 FL — SIGNIFICANT CHANGE UP (ref 80–100)
NRBC # FLD: 0 — SIGNIFICANT CHANGE UP
PCO2 BLDV: 58 MMHG — HIGH (ref 41–51)
PH BLDV: 7.31 PH — LOW (ref 7.32–7.43)
PHOSPHATE SERPL-MCNC: 3.6 MG/DL — SIGNIFICANT CHANGE UP (ref 2.5–4.5)
PLATELET # BLD AUTO: 100 K/UL — LOW (ref 150–400)
PMV BLD: 12.5 FL — SIGNIFICANT CHANGE UP (ref 7–13)
PO2 BLDV: 37 MMHG — SIGNIFICANT CHANGE UP (ref 35–40)
POTASSIUM BLDV-SCNC: 3.5 MMOL/L — SIGNIFICANT CHANGE UP (ref 3.4–4.5)
POTASSIUM SERPL-MCNC: 3.8 MMOL/L — SIGNIFICANT CHANGE UP (ref 3.5–5.3)
POTASSIUM SERPL-SCNC: 3.8 MMOL/L — SIGNIFICANT CHANGE UP (ref 3.5–5.3)
PROT SERPL-MCNC: 7.6 G/DL — SIGNIFICANT CHANGE UP (ref 6–8.3)
PROTHROM AB SERPL-ACNC: 23.4 SEC — HIGH (ref 9.8–13.1)
RBC # BLD: 3.56 M/UL — LOW (ref 4.2–5.8)
RBC # FLD: 16.3 % — HIGH (ref 10.3–14.5)
SAO2 % BLDV: 60.2 % — SIGNIFICANT CHANGE UP (ref 60–85)
SODIUM SERPL-SCNC: 132 MMOL/L — LOW (ref 135–145)
WBC # BLD: 4.37 K/UL — SIGNIFICANT CHANGE UP (ref 3.8–10.5)
WBC # FLD AUTO: 4.37 K/UL — SIGNIFICANT CHANGE UP (ref 3.8–10.5)

## 2018-02-22 RX ORDER — WARFARIN SODIUM 2.5 MG/1
3 TABLET ORAL ONCE
Qty: 0 | Refills: 0 | Status: COMPLETED | OUTPATIENT
Start: 2018-02-22 | End: 2018-02-22

## 2018-02-22 RX ADMIN — Medication 3 MILLILITER(S): at 15:51

## 2018-02-22 RX ADMIN — WARFARIN SODIUM 3 MILLIGRAM(S): 2.5 TABLET ORAL at 18:37

## 2018-02-22 RX ADMIN — ATORVASTATIN CALCIUM 80 MILLIGRAM(S): 80 TABLET, FILM COATED ORAL at 22:35

## 2018-02-22 RX ADMIN — DIPHENHYDRAMINE HYDROCHLORIDE AND LIDOCAINE HYDROCHLORIDE AND ALUMINUM HYDROXIDE AND MAGNESIUM HYDRO 30 MILLILITER(S): KIT at 06:02

## 2018-02-22 RX ADMIN — Medication 3 MILLILITER(S): at 10:07

## 2018-02-22 RX ADMIN — MIDODRINE HYDROCHLORIDE 30 MILLIGRAM(S): 2.5 TABLET ORAL at 06:00

## 2018-02-22 RX ADMIN — Medication 81 MILLIGRAM(S): at 11:44

## 2018-02-22 RX ADMIN — Medication 16.94 MICROGRAM(S)/KG/MIN: at 06:01

## 2018-02-22 RX ADMIN — MIDODRINE HYDROCHLORIDE 30 MILLIGRAM(S): 2.5 TABLET ORAL at 22:35

## 2018-02-22 RX ADMIN — MIDODRINE HYDROCHLORIDE 30 MILLIGRAM(S): 2.5 TABLET ORAL at 15:32

## 2018-02-22 RX ADMIN — Medication 3 MILLILITER(S): at 21:50

## 2018-02-22 RX ADMIN — Medication 100 MILLIGRAM(S): at 06:00

## 2018-02-22 RX ADMIN — CHLORHEXIDINE GLUCONATE 1 APPLICATION(S): 213 SOLUTION TOPICAL at 11:46

## 2018-02-22 RX ADMIN — Medication 1: at 12:00

## 2018-02-22 RX ADMIN — AMIODARONE HYDROCHLORIDE 100 MILLIGRAM(S): 400 TABLET ORAL at 06:00

## 2018-02-22 RX ADMIN — DIPHENHYDRAMINE HYDROCHLORIDE AND LIDOCAINE HYDROCHLORIDE AND ALUMINUM HYDROXIDE AND MAGNESIUM HYDRO 30 MILLILITER(S): KIT at 15:36

## 2018-02-22 RX ADMIN — Medication 16.94 MICROGRAM(S)/KG/MIN: at 22:36

## 2018-02-22 RX ADMIN — Medication 2 PUFF(S): at 10:07

## 2018-02-22 RX ADMIN — Medication 75 MICROGRAM(S): at 06:00

## 2018-02-22 RX ADMIN — Medication 3 MILLILITER(S): at 05:24

## 2018-02-22 RX ADMIN — DIPHENHYDRAMINE HYDROCHLORIDE AND LIDOCAINE HYDROCHLORIDE AND ALUMINUM HYDROXIDE AND MAGNESIUM HYDRO 30 MILLILITER(S): KIT at 22:35

## 2018-02-22 RX ADMIN — Medication 2 PUFF(S): at 21:51

## 2018-02-22 RX ADMIN — FINASTERIDE 5 MILLIGRAM(S): 5 TABLET, FILM COATED ORAL at 11:46

## 2018-02-22 RX ADMIN — Medication 1 TABLET(S): at 11:46

## 2018-02-22 NOTE — PROGRESS NOTE ADULT - PROBLEM SELECTOR PLAN 9
.  - DVT: Coumadin, goal INR 2-3.  - DISPO: likely home when off pressors; pt refuses palliative/hospice consult.     Sean Martínez MD  PGY-1 | Internal Medicine  [phone] / [phone]

## 2018-02-22 NOTE — PROGRESS NOTE ADULT - SUBJECTIVE AND OBJECTIVE BOX
PATIENT: KURT STRONG   AGE: 63yo   GENDER: Male  ALLERGIES: No Known Allergies    CHIEF COMPLAINT:   SOB x few hours (20 Feb 2018 18:05)    INTERVAL EVENTS:   BP  / 51-79.      MEDICATIONS  ALBUTerol/ipratropium for Nebulization 3 milliLiter(s) Nebulizer every 6 hours  amiodarone    Tablet 100 milliGRAM(s) Oral daily  aspirin enteric coated 81 milliGRAM(s) Oral daily  atorvastatin 80 milliGRAM(s) Oral at bedtime  benzocaine 15 mG/menthol 3.6 mG Lozenge 1 Lozenge Oral every 6 hours PRN Sore Throat  buDESOnide   90 MICROgram(s) Inhaler 2 Puff(s) Inhalation two times a day  chlorhexidine 4% Liquid 1 Application(s) Topical daily  dextrose 5%. 1000 milliLiter(s) (50 mL/Hr) IV Continuous <Continuous>  dextrose 5%. 1000 milliLiter(s) (50 mL/Hr) IV Continuous <Continuous>  dextrose 50% Injectable 12.5 Gram(s) IV Push once  dextrose 50% Injectable 25 Gram(s) IV Push once  dextrose 50% Injectable 25 Gram(s) IV Push once  dextrose Gel 1 Dose(s) Oral once PRN Blood Glucose LESS THAN 70 milliGRAM(s)/deciLiter  dextrose Gel 1 Dose(s) Oral once PRN Blood Glucose LESS THAN 70 milliGRAM(s)/deciliter  DOBUTamine Infusion 7.5 MICROgram(s)/kG/Min (16.942 mL/Hr) IV Continuous <Continuous>  docusate sodium 100 milliGRAM(s) Oral two times a day  finasteride 5 milliGRAM(s) Oral daily  glucagon  Injectable 1 milliGRAM(s) IntraMuscular once PRN Glucose <70 milliGRAM(s)/deciLiter  influenza   Vaccine 0.5 milliLiter(s) IntraMuscular once  insulin lispro (HumaLOG) corrective regimen sliding scale   SubCutaneous three times a day before meals  insulin lispro (HumaLOG) corrective regimen sliding scale   SubCutaneous at bedtime  levothyroxine 75 MICROGram(s) Oral daily  midodrine 30 milliGRAM(s) Oral three times a day  MIRACLE MOUTHWASH 30 milliLiter(s) Swish and Spit three times a day  Nephro-lynda 1 Tablet(s) Oral daily  norepinephrine Infusion 0.1 MICROgram(s)/kG/Min (14.063 mL/Hr) IV Continuous <Continuous>  polyethylene glycol 3350 17 Gram(s) Oral daily  senna 2 Tablet(s) Oral at bedtime      VITAL SIGNS (last 24 hrs):  ICU Vital Signs Last 24 Hrs  T(C): 36.4 (22 Feb 2018 07:52), Max: 36.9 (21 Feb 2018 20:00)  T(F): 97.5 (22 Feb 2018 07:52), Max: 98.4 (21 Feb 2018 20:00)  HR: 79 (22 Feb 2018 06:30) (76 - 101)  BP: 96/64 (22 Feb 2018 06:30) (69/55 - 109/76)  BP(mean): 70 (22 Feb 2018 06:30) (49 - 96)  ABP: --  ABP(mean): --  RR: 18 (22 Feb 2018 06:30) (15 - 23)  SpO2: 97% (22 Feb 2018 06:30) (77% - 99%)      PHYSICAL EXAM:  GENERAL: NAD  HEENT:  AT/NC; EOMI, PERRLA, conjunctiva and sclera clear; hearing grossly intact  NECK: Supple, non tender  CHEST/LUNG: CTAB, no wheezes, ronchi, rales  HEART: Normal rate, regular rhythm; No murmurs, rubs, or gallops; No JVD or peripheral edema  ABDOMEN: soft, NTTP, nondistended, normoactive BS  MSK/EXTREMITIES:  Grossly normal strength, movement, tone, and ROM; Palpable peripheral pulses; No clubbing or cyanosis  PSYCH: AAOx4  NEUROLOGY: Non focal   SKIN: No rashes or lesions      WEIGHT:   Admission Weight (kg): 75.3 (02-18-18)  Daily     I/O SUMMARY:    02-21-18 @ 07:01  -  02-22-18 @ 07:00  --------------------------------------------------------  IN: 1955.4 mL / OUT: 900 mL / NET: 1055.4 mL        LABS:                         10.8   4.37  >-----< 100           ( 02-22-18 @ 05:30 )             34.2       132  |  95  |   25  ----------------------< 112    (02-22-18 @ 05:30)     3.8  |  24  |  5.48    Anion Gap: --    Ca   8.2   (02-22-18 @ 05:30)  Mg   2.0   (02-22-18 @ 05:30)  Phos 3.6   (02-22-18 @ 05:30)       TP 8.2     |  AST 2.5    -------------------------     Alb x     |  ALT x               (02-22-18 @ 05:30)  -------------------------     T-bili 2.5  |  AlkPh 193    D-bili x   COAGULATION STUDIES:     aPTT  x        (02-22-18 @ 05:30)     PT     23.4 SEC    (02-22-18 @ 05:30)     INR    2.08          (02-22-18 @ 05:30), COAGULATION STUDIES:     aPTT  x        (02-21-18 @ 05:40)     PT     28.9 SEC    (02-21-18 @ 05:40)     INR    2.47          (02-21-18 @ 05:40)     POCT Blood Glucose.: 93 mg/dL (02-22-18 @ 08:58)  POCT Blood Glucose.: 171 mg/dL (02-21-18 @ 22:15)  POCT Blood Glucose.: 132 mg/dL (02-21-18 @ 18:19)  POCT Blood Glucose.: 203 mg/dL (02-21-18 @ 13:16)        MICROBIOLOGY:      ECHO:       RADIOLOGY & ADDITIONAL TESTS: PATIENT: KURT STRONG   AGE: 61yo   GENDER: Male  ALLERGIES: No Known Allergies    CHIEF COMPLAINT:   SOB x few hours (20 Feb 2018 18:05)    INTERVAL EVENTS:  Levo gtt decreased from 0.1 to 0.57 overnight. BP  / 51-79. Tried to wean venti mask from 50% to 40% FiO2, however pt did not tolerate it, now back to 50%. Pt without complaints this morning, status unchanged.      MEDICATIONS  ALBUTerol/ipratropium for Nebulization 3 milliLiter(s) Nebulizer every 6 hours  amiodarone    Tablet 100 milliGRAM(s) Oral daily  aspirin enteric coated 81 milliGRAM(s) Oral daily  atorvastatin 80 milliGRAM(s) Oral at bedtime  benzocaine 15 mG/menthol 3.6 mG Lozenge 1 Lozenge Oral every 6 hours PRN Sore Throat  buDESOnide   90 MICROgram(s) Inhaler 2 Puff(s) Inhalation two times a day  chlorhexidine 4% Liquid 1 Application(s) Topical daily  dextrose 5%. 1000 milliLiter(s) (50 mL/Hr) IV Continuous <Continuous>  dextrose 5%. 1000 milliLiter(s) (50 mL/Hr) IV Continuous <Continuous>  dextrose 50% Injectable 12.5 Gram(s) IV Push once  dextrose 50% Injectable 25 Gram(s) IV Push once  dextrose 50% Injectable 25 Gram(s) IV Push once  dextrose Gel 1 Dose(s) Oral once PRN Blood Glucose LESS THAN 70 milliGRAM(s)/deciLiter  dextrose Gel 1 Dose(s) Oral once PRN Blood Glucose LESS THAN 70 milliGRAM(s)/deciliter  DOBUTamine Infusion 7.5 MICROgram(s)/kG/Min (16.942 mL/Hr) IV Continuous <Continuous>  docusate sodium 100 milliGRAM(s) Oral two times a day  finasteride 5 milliGRAM(s) Oral daily  glucagon  Injectable 1 milliGRAM(s) IntraMuscular once PRN Glucose <70 milliGRAM(s)/deciLiter  influenza   Vaccine 0.5 milliLiter(s) IntraMuscular once  insulin lispro (HumaLOG) corrective regimen sliding scale   SubCutaneous three times a day before meals  insulin lispro (HumaLOG) corrective regimen sliding scale   SubCutaneous at bedtime  levothyroxine 75 MICROGram(s) Oral daily  midodrine 30 milliGRAM(s) Oral three times a day  MIRACLE MOUTHWASH 30 milliLiter(s) Swish and Spit three times a day  Nephro-lynda 1 Tablet(s) Oral daily  norepinephrine Infusion 0.1 MICROgram(s)/kG/Min (14.063 mL/Hr) IV Continuous <Continuous>  polyethylene glycol 3350 17 Gram(s) Oral daily  senna 2 Tablet(s) Oral at bedtime      VITAL SIGNS (last 24 hrs):  ICU Vital Signs Last 24 Hrs  T(C): 36.4 (22 Feb 2018 07:52), Max: 36.9 (21 Feb 2018 20:00)  T(F): 97.5 (22 Feb 2018 07:52), Max: 98.4 (21 Feb 2018 20:00)  HR: 79 (22 Feb 2018 06:30) (76 - 101)  BP: 96/64 (22 Feb 2018 06:30) (69/55 - 109/76)  BP(mean): 70 (22 Feb 2018 06:30) (49 - 96)  ABP: --  ABP(mean): --  RR: 18 (22 Feb 2018 06:30) (15 - 23)  SpO2: 97% (22 Feb 2018 06:30) (77% - 99%)      PHYSICAL EXAM:  GENERAL: appears somewhat improved today, though still ill-appearing  HEENT:  AT/NC; EOMI, PERRLA, conjunctiva and sclera clear; hearing grossly intact  NECK: Supple, non tender  CHEST/LUNG: Course crackles throughout; saturating >95% on venturi mask 50% FiO2  HEART: Normal rate, regular rhythm; No murmurs, rubs, or gallops; JVD to mandible, 1+ B/L LE edema  ABDOMEN: soft, NTTP, nondistended, normoactive BS  MSK/EXTREMITIES:  Grossly normal strength, movement, tone, and ROM; Palpable peripheral pulses      WEIGHT:   Admission Weight (kg): 75.3 (02-18-18)  Daily     I/O SUMMARY:    02-21-18 @ 07:01  -  02-22-18 @ 07:00  --------------------------------------------------------  IN: 1955.4 mL / OUT: 900 mL / NET: 1055.4 mL        LABS:                         10.8   4.37  >-----< 100           ( 02-22-18 @ 05:30 )             34.2       132  |  95  |   25  ----------------------< 112    (02-22-18 @ 05:30)     3.8  |  24  |  5.48    Anion Gap: --    Ca   8.2   (02-22-18 @ 05:30)  Mg   2.0   (02-22-18 @ 05:30)  Phos 3.6   (02-22-18 @ 05:30)       TP 8.2     |  AST 2.5    -------------------------     Alb x     |  ALT x               (02-22-18 @ 05:30)  -------------------------     T-bili 2.5  |  AlkPh 193    D-bili x   COAGULATION STUDIES:     aPTT  x        (02-22-18 @ 05:30)     PT     23.4 SEC    (02-22-18 @ 05:30)     INR    2.08          (02-22-18 @ 05:30), COAGULATION STUDIES:     aPTT  x        (02-21-18 @ 05:40)     PT     28.9 SEC    (02-21-18 @ 05:40)     INR    2.47          (02-21-18 @ 05:40)     POCT Blood Glucose.: 93 mg/dL (02-22-18 @ 08:58)  POCT Blood Glucose.: 171 mg/dL (02-21-18 @ 22:15)  POCT Blood Glucose.: 132 mg/dL (02-21-18 @ 18:19)  POCT Blood Glucose.: 203 mg/dL (02-21-18 @ 13:16)        MICROBIOLOGY:      ECHO:       RADIOLOGY & ADDITIONAL TESTS:

## 2018-02-22 NOTE — PROGRESS NOTE ADULT - ASSESSMENT
63yo M with ESRD (M/W/F), NICM (EF 21%, s/p ICD, PICC with home dobutamine gtt), AFib (on coumadin), COPD on home O2 (4-5L), pulmonary fibrosis, hypotension (on midodrine) admitted with Acute on Chronic Respiratory Failure 2/2 influenza B infection s/p tamiflu x 5d exacerbating underlying pulmonary fibrosis/COPD, transferred to CCU for acute on chronic hypotension, on levophed gtt.

## 2018-02-22 NOTE — PROGRESS NOTE ADULT - PROBLEM SELECTOR PLAN 2
.  - TTE Oct 2017: EF 21%, severely dilated LA, mod LV enlargement, Severe global LV syst dysfxn, RV enlargement w/ decreased RV syst fxn, mod TR, mild ND.  - On exam: JVD, peripheral edema, B/L crackles though unclear if there is fluid overlying pulm fibrosis  - c/w dobutamine gtt  - HD MWF -- per Dr. Flores (pt's nephrologist), pt is chronically fluid overloaded 2/2 poor volume restriction adherence

## 2018-02-22 NOTE — PROGRESS NOTE ADULT - PROBLEM SELECTOR PLAN 1
.  - Acute on chronic HoTN (baseline SBP 90s-100s). Initial drop in BP was likely 2/2 volume depletion in the setting of Influenza, however unclear why pt continues to be hypotensive.  - Goal SBP 85, per cardiology.  - Levophed gtt started 2/17, currently trying to wean, however pt so far has not tolerated rate < 0.05 mcg.  - c/w home Midodrine dose 30mg TID.   - HD MWF

## 2018-02-22 NOTE — PROGRESS NOTE ADULT - ASSESSMENT
61yo M with ESRD (M/W/F), NICM (EF 21%, s/p ICD, PICC with home dobutamine gtt), AFib (on coumadin), COPD on home O2 (4-5L), pulmonary fibrosis, hypotension (on midodrine) admitted with Acute on Chronic Respiratory Failure 2/2 influenza B infection s/p tamiflu x 5d exacerbating underlying pulmonary fibrosis/COPD, transferred to CCU for acute on chronic hypotension, on levophed gtt.   Improving NYHA Class IV ACC/AHA Stage D  Titrate off Levophed gtt as hemodynamics allow  Mobilize / PT  Unable to employ guideline directed medical therapy secondary to marked hypotension  Cont present care d/w CCu staff

## 2018-02-22 NOTE — PROGRESS NOTE ADULT - PROBLEM SELECTOR PLAN 1
Maintenance HD schedule MWF.  Pt tolerating HD yesterday, with  achieved.   Electrolytes acceptable. chronic volume overload 2/2 severe CMP  Continue midodrine. Levophed titration as per primary team.   c/w renal diet, fluid restrictions

## 2018-02-22 NOTE — PROGRESS NOTE ADULT - SUBJECTIVE AND OBJECTIVE BOX
Pt seen and examined at bedside  No new complaints today. Denies SOB.     Allergies:  No Known Allergies    Hospital Medications:   MEDICATIONS  (STANDING):  ALBUTerol/ipratropium for Nebulization 3 milliLiter(s) Nebulizer every 6 hours  amiodarone    Tablet 100 milliGRAM(s) Oral daily  aspirin enteric coated 81 milliGRAM(s) Oral daily  atorvastatin 80 milliGRAM(s) Oral at bedtime  buDESOnide   90 MICROgram(s) Inhaler 2 Puff(s) Inhalation two times a day  chlorhexidine 4% Liquid 1 Application(s) Topical daily  dextrose 5%. 1000 milliLiter(s) (50 mL/Hr) IV Continuous <Continuous>  dextrose 5%. 1000 milliLiter(s) (50 mL/Hr) IV Continuous <Continuous>  dextrose 50% Injectable 12.5 Gram(s) IV Push once  dextrose 50% Injectable 25 Gram(s) IV Push once  dextrose 50% Injectable 25 Gram(s) IV Push once  DOBUTamine Infusion 7.5 MICROgram(s)/kG/Min (16.942 mL/Hr) IV Continuous <Continuous>  docusate sodium 100 milliGRAM(s) Oral two times a day  finasteride 5 milliGRAM(s) Oral daily  influenza   Vaccine 0.5 milliLiter(s) IntraMuscular once  insulin lispro (HumaLOG) corrective regimen sliding scale   SubCutaneous three times a day before meals  insulin lispro (HumaLOG) corrective regimen sliding scale   SubCutaneous at bedtime  levothyroxine 75 MICROGram(s) Oral daily  midodrine 30 milliGRAM(s) Oral three times a day  MIRACLE MOUTHWASH 30 milliLiter(s) Swish and Spit three times a day  Nephro-lynda 1 Tablet(s) Oral daily  norepinephrine Infusion 0.1 MICROgram(s)/kG/Min (14.063 mL/Hr) IV Continuous <Continuous>  polyethylene glycol 3350 17 Gram(s) Oral daily  senna 2 Tablet(s) Oral at bedtime  warfarin 3 milliGRAM(s) Oral once    VITALS:  T(F): 97.8 (02-22-18 @ 12:00), Max: 98.4 (02-21-18 @ 20:00)  HR: 75 (02-22-18 @ 11:39)  BP: 73/48 (02-22-18 @ 10:00)  RR: 18 (02-22-18 @ 11:00)  SpO2: 100% (02-22-18 @ 11:39)  Wt(kg): --    02-21 @ 07:01  -  02-22 @ 07:00  --------------------------------------------------------  IN: 1980.3 mL / OUT: 900 mL / NET: 1080.3 mL    02-22 @ 07:01  -  02-22 @ 13:27  --------------------------------------------------------  IN: 299.6 mL / OUT: 0 mL / NET: 299.6 mL        PHYSICAL EXAM:  Constitutional: NAD  HEENT: anicteric sclera, oropharynx clear, MMM  Neck: + JVD  Respiratory: Bibasilar crackles. No wheeze   Cardiovascular: S1, S2, RRR  Gastrointestinal: BS+, soft, NT/ND  Extremities: No cyanosis or clubbing. 1+ peripheral LE edema  Neurological: A/O x 3, no focal deficits  Psychiatric: Normal mood, normal affect  : No CVA tenderness. No rosa.   Skin: No rashes  Vascular Access: RIJ Tunneled cath.     LABS:  02-22    132<L>  |  95<L>  |  25<H>  ----------------------------<  112<H>  3.8   |  24  |  5.48<H>    Ca    8.2<L>      22 Feb 2018 05:30  Phos  3.6     02-22  Mg     2.0     02-22    TPro  7.6  /  Alb  2.5<L>  /  TBili  0.9  /  DBili      /  AST  78<H>  /  ALT  51<H>  /  AlkPhos  193<H>  02-22    Creatinine Trend: 5.48 <--, 6.31 <--, 5.28 <--, 7.09 <--, 7.03 <--, 5.15 <--, 5.71 <--, 4.54 <--, 6.56 <--                        10.8   4.37  )-----------( 100      ( 22 Feb 2018 05:30 )             34.2     Urine Studies:      RADIOLOGY & ADDITIONAL STUDIES:

## 2018-02-22 NOTE — PROGRESS NOTE ADULT - SUBJECTIVE AND OBJECTIVE BOX
SUBJECTIVE:  No Cp or SOB    MEDICATIONS:  amiodarone    Tablet 100 milliGRAM(s) Oral daily  DOBUTamine Infusion 7.5 MICROgram(s)/kG/Min IV Continuous <Continuous>  midodrine 30 milliGRAM(s) Oral three times a day  norepinephrine Infusion 0.1 MICROgram(s)/kG/Min IV Continuous <Continuous>      ALBUTerol/ipratropium for Nebulization 3 milliLiter(s) Nebulizer every 6 hours  buDESOnide   90 MICROgram(s) Inhaler 2 Puff(s) Inhalation two times a day      docusate sodium 100 milliGRAM(s) Oral two times a day  polyethylene glycol 3350 17 Gram(s) Oral daily  senna 2 Tablet(s) Oral at bedtime    atorvastatin 80 milliGRAM(s) Oral at bedtime  dextrose 50% Injectable 12.5 Gram(s) IV Push once  dextrose 50% Injectable 25 Gram(s) IV Push once  dextrose 50% Injectable 25 Gram(s) IV Push once  dextrose Gel 1 Dose(s) Oral once PRN  dextrose Gel 1 Dose(s) Oral once PRN  finasteride 5 milliGRAM(s) Oral daily  glucagon  Injectable 1 milliGRAM(s) IntraMuscular once PRN  insulin lispro (HumaLOG) corrective regimen sliding scale   SubCutaneous three times a day before meals  insulin lispro (HumaLOG) corrective regimen sliding scale   SubCutaneous at bedtime  levothyroxine 75 MICROGram(s) Oral daily    aspirin enteric coated 81 milliGRAM(s) Oral daily  benzocaine 15 mG/menthol 3.6 mG Lozenge 1 Lozenge Oral every 6 hours PRN  chlorhexidine 4% Liquid 1 Application(s) Topical daily  dextrose 5%. 1000 milliLiter(s) IV Continuous <Continuous>  dextrose 5%. 1000 milliLiter(s) IV Continuous <Continuous>  influenza   Vaccine 0.5 milliLiter(s) IntraMuscular once  MIRACLE MOUTHWASH 30 milliLiter(s) Swish and Spit three times a day  Nephro-lynda 1 Tablet(s) Oral daily  warfarin 3 milliGRAM(s) Oral once      REVIEW OF SYSTEMS:    CONSTITUTIONAL: No fever, weight loss, or fatigue  EYES: No eye pain, visual disturbances, or discharge  NECK: No pain or stiffness  RESPIRATORY: No cough, wheezing, chills or hemoptysis; No Shortness of Breath  CARDIOVASCULAR: No chest pain, palpitations, dizziness, or leg swelling  GASTROINTESTINAL: No abdominal or epigastric pain. No nausea, vomiting, or hematemesis; No diarrhea or constipation. No melena or hematochezia.  GENITOURINARY: No dysuria, frequency, hematuria, or incontinence  NEUROLOGICAL: No headaches, memory loss, loss of strength, numbness, or tremors  SKIN: No itching, burning, rashes, or lesions   LYMPH Nodes: No enlarged glands  MUSCULOSKELETAL: No joint pain or swelling; No muscle, back, or extremity pain  All other review of systems are negative.  	  [ ] Unable to obtain    PHYSICAL EXAM:  T(C): 36.6 (02-22-18 @ 12:00), Max: 36.9 (02-21-18 @ 20:00)  HR: 75 (02-22-18 @ 11:39) (69 - 101)  BP: 73/48 (02-22-18 @ 10:00) (69/55 - 109/76)  RR: 18 (02-22-18 @ 11:00) (14 - 23)  SpO2: 100% (02-22-18 @ 11:39) (77% - 100%)  Wt(kg): --  I&O's Summary    21 Feb 2018 07:01  -  22 Feb 2018 07:00  --------------------------------------------------------  IN: 1980.3 mL / OUT: 900 mL / NET: 1080.3 mL    22 Feb 2018 07:01  -  22 Feb 2018 12:44  --------------------------------------------------------  IN: 299.6 mL / OUT: 0 mL / NET: 299.6 mL          PHYSICAL EXAM    Appearance: Normal	  HEENT:   Normal oral mucosa, PERRL, EOMI	  NECK: Soft and supple, No LAD, No JVD  Cardiovascular: Regular Rate and Rhythm, Normal S1 S2, No murmurs, No clicks, gallops or rubs  Respiratory: Lungs clear to auscultation	  Gastrointestinal:  Soft, Non-tender, + BS	  Skin: No rashes, No ecchymoses, No cyanosis  Neurologic: Non-focal  Extremities: No clubbing, cyanosis or edema  Vascular: Peripheral pulses palpable 2+ bilaterally    LABS:	 	    CARDIAC MARKERS:                                  10.8   4.37  )-----------( 100      ( 22 Feb 2018 05:30 )             34.2     02-22    132<L>  |  95<L>  |  25<H>  ----------------------------<  112<H>  3.8   |  24  |  5.48<H>    Ca    8.2<L>      22 Feb 2018 05:30  Phos  3.6     02-22  Mg     2.0     02-22    TPro  7.6  /  Alb  2.5<L>  /  TBili  0.9  /  DBili  x   /  AST  78<H>  /  ALT  51<H>  /  AlkPhos  193<H>  02-22

## 2018-02-23 LAB
ALBUMIN SERPL ELPH-MCNC: 2.7 G/DL — LOW (ref 3.3–5)
ALP SERPL-CCNC: 211 U/L — HIGH (ref 40–120)
ALT FLD-CCNC: 54 U/L — HIGH (ref 4–41)
AST SERPL-CCNC: 79 U/L — HIGH (ref 4–40)
BILIRUB SERPL-MCNC: 0.7 MG/DL — SIGNIFICANT CHANGE UP (ref 0.2–1.2)
BUN SERPL-MCNC: 35 MG/DL — HIGH (ref 7–23)
CALCIUM SERPL-MCNC: 8.2 MG/DL — LOW (ref 8.4–10.5)
CHLORIDE SERPL-SCNC: 93 MMOL/L — LOW (ref 98–107)
CO2 SERPL-SCNC: 24 MMOL/L — SIGNIFICANT CHANGE UP (ref 22–31)
CREAT SERPL-MCNC: 6.47 MG/DL — HIGH (ref 0.5–1.3)
GLUCOSE SERPL-MCNC: 118 MG/DL — HIGH (ref 70–99)
HBV SURFACE AG SER-ACNC: NEGATIVE — SIGNIFICANT CHANGE UP
HCT VFR BLD CALC: 35.7 % — LOW (ref 39–50)
HGB BLD-MCNC: 11.2 G/DL — LOW (ref 13–17)
INR BLD: 2.08 — HIGH (ref 0.88–1.17)
MAGNESIUM SERPL-MCNC: 2 MG/DL — SIGNIFICANT CHANGE UP (ref 1.6–2.6)
MCHC RBC-ENTMCNC: 30.5 PG — SIGNIFICANT CHANGE UP (ref 27–34)
MCHC RBC-ENTMCNC: 31.4 % — LOW (ref 32–36)
MCV RBC AUTO: 97.3 FL — SIGNIFICANT CHANGE UP (ref 80–100)
NRBC # FLD: 0 — SIGNIFICANT CHANGE UP
PHOSPHATE SERPL-MCNC: 4.1 MG/DL — SIGNIFICANT CHANGE UP (ref 2.5–4.5)
PLATELET # BLD AUTO: 103 K/UL — LOW (ref 150–400)
PMV BLD: 12.6 FL — SIGNIFICANT CHANGE UP (ref 7–13)
POTASSIUM SERPL-MCNC: 4 MMOL/L — SIGNIFICANT CHANGE UP (ref 3.5–5.3)
POTASSIUM SERPL-SCNC: 4 MMOL/L — SIGNIFICANT CHANGE UP (ref 3.5–5.3)
PROT SERPL-MCNC: 7.8 G/DL — SIGNIFICANT CHANGE UP (ref 6–8.3)
PROTHROM AB SERPL-ACNC: 24.3 SEC — HIGH (ref 9.8–13.1)
RBC # BLD: 3.67 M/UL — LOW (ref 4.2–5.8)
RBC # FLD: 16.4 % — HIGH (ref 10.3–14.5)
SODIUM SERPL-SCNC: 131 MMOL/L — LOW (ref 135–145)
WBC # BLD: 4.18 K/UL — SIGNIFICANT CHANGE UP (ref 3.8–10.5)
WBC # FLD AUTO: 4.18 K/UL — SIGNIFICANT CHANGE UP (ref 3.8–10.5)

## 2018-02-23 RX ORDER — WARFARIN SODIUM 2.5 MG/1
3 TABLET ORAL ONCE
Qty: 0 | Refills: 0 | Status: COMPLETED | OUTPATIENT
Start: 2018-02-23 | End: 2018-02-23

## 2018-02-23 RX ADMIN — Medication 1: at 17:44

## 2018-02-23 RX ADMIN — Medication 16.94 MICROGRAM(S)/KG/MIN: at 18:46

## 2018-02-23 RX ADMIN — MIDODRINE HYDROCHLORIDE 30 MILLIGRAM(S): 2.5 TABLET ORAL at 14:14

## 2018-02-23 RX ADMIN — Medication 2 PUFF(S): at 09:40

## 2018-02-23 RX ADMIN — ATORVASTATIN CALCIUM 80 MILLIGRAM(S): 80 TABLET, FILM COATED ORAL at 22:12

## 2018-02-23 RX ADMIN — Medication 2 PUFF(S): at 22:38

## 2018-02-23 RX ADMIN — WARFARIN SODIUM 3 MILLIGRAM(S): 2.5 TABLET ORAL at 17:48

## 2018-02-23 RX ADMIN — Medication 1 TABLET(S): at 12:27

## 2018-02-23 RX ADMIN — Medication 3 MILLILITER(S): at 04:44

## 2018-02-23 RX ADMIN — Medication 100 MILLIGRAM(S): at 06:09

## 2018-02-23 RX ADMIN — FINASTERIDE 5 MILLIGRAM(S): 5 TABLET, FILM COATED ORAL at 12:27

## 2018-02-23 RX ADMIN — Medication 100 MILLIGRAM(S): at 17:48

## 2018-02-23 RX ADMIN — Medication 16.94 MICROGRAM(S)/KG/MIN: at 01:58

## 2018-02-23 RX ADMIN — Medication 16.94 MICROGRAM(S)/KG/MIN: at 22:13

## 2018-02-23 RX ADMIN — CHLORHEXIDINE GLUCONATE 1 APPLICATION(S): 213 SOLUTION TOPICAL at 12:27

## 2018-02-23 RX ADMIN — DIPHENHYDRAMINE HYDROCHLORIDE AND LIDOCAINE HYDROCHLORIDE AND ALUMINUM HYDROXIDE AND MAGNESIUM HYDRO 30 MILLILITER(S): KIT at 22:13

## 2018-02-23 RX ADMIN — Medication 100 MILLIGRAM(S): at 18:46

## 2018-02-23 RX ADMIN — SENNA PLUS 2 TABLET(S): 8.6 TABLET ORAL at 22:12

## 2018-02-23 RX ADMIN — Medication 3 MILLILITER(S): at 16:23

## 2018-02-23 RX ADMIN — Medication 16.94 MICROGRAM(S)/KG/MIN: at 07:37

## 2018-02-23 RX ADMIN — Medication 3 MILLILITER(S): at 09:38

## 2018-02-23 RX ADMIN — Medication 3 MILLILITER(S): at 22:25

## 2018-02-23 RX ADMIN — AMIODARONE HYDROCHLORIDE 100 MILLIGRAM(S): 400 TABLET ORAL at 06:09

## 2018-02-23 RX ADMIN — Medication 75 MICROGRAM(S): at 06:09

## 2018-02-23 RX ADMIN — MIDODRINE HYDROCHLORIDE 30 MILLIGRAM(S): 2.5 TABLET ORAL at 06:09

## 2018-02-23 RX ADMIN — Medication 81 MILLIGRAM(S): at 12:27

## 2018-02-23 RX ADMIN — MIDODRINE HYDROCHLORIDE 30 MILLIGRAM(S): 2.5 TABLET ORAL at 22:12

## 2018-02-23 RX ADMIN — DIPHENHYDRAMINE HYDROCHLORIDE AND LIDOCAINE HYDROCHLORIDE AND ALUMINUM HYDROXIDE AND MAGNESIUM HYDRO 30 MILLILITER(S): KIT at 06:10

## 2018-02-23 RX ADMIN — Medication 1: at 22:13

## 2018-02-23 NOTE — PROGRESS NOTE ADULT - SUBJECTIVE AND OBJECTIVE BOX
PATIENT: KURT STRONG   AGE: 63yo   GENDER: Male  ALLERGIES: No Known Allergies    CHIEF COMPLAINT:   SOB x few hours (20 Feb 2018 18:05)    INTERVAL EVENTS:   No acute events overnight. Levo gtt D/C'ed yesterday, BP stable overnight. Still requiring 50% FiO2. Pt denies CP, SOB, palpitations, N/V this morning. HD today.      MEDICATIONS  ALBUTerol/ipratropium for Nebulization 3 milliLiter(s) Nebulizer every 6 hours  amiodarone    Tablet 100 milliGRAM(s) Oral daily  aspirin enteric coated 81 milliGRAM(s) Oral daily  atorvastatin 80 milliGRAM(s) Oral at bedtime  benzocaine 15 mG/menthol 3.6 mG Lozenge 1 Lozenge Oral every 6 hours PRN Sore Throat  buDESOnide   90 MICROgram(s) Inhaler 2 Puff(s) Inhalation two times a day  chlorhexidine 4% Liquid 1 Application(s) Topical daily  dextrose 5%. 1000 milliLiter(s) (50 mL/Hr) IV Continuous <Continuous>  dextrose 5%. 1000 milliLiter(s) (50 mL/Hr) IV Continuous <Continuous>  dextrose 50% Injectable 12.5 Gram(s) IV Push once  dextrose 50% Injectable 25 Gram(s) IV Push once  dextrose 50% Injectable 25 Gram(s) IV Push once  dextrose Gel 1 Dose(s) Oral once PRN Blood Glucose LESS THAN 70 milliGRAM(s)/deciLiter  dextrose Gel 1 Dose(s) Oral once PRN Blood Glucose LESS THAN 70 milliGRAM(s)/deciliter  DOBUTamine Infusion 7.5 MICROgram(s)/kG/Min (16.942 mL/Hr) IV Continuous <Continuous>  docusate sodium 100 milliGRAM(s) Oral two times a day  finasteride 5 milliGRAM(s) Oral daily  glucagon  Injectable 1 milliGRAM(s) IntraMuscular once PRN Glucose <70 milliGRAM(s)/deciLiter  influenza   Vaccine 0.5 milliLiter(s) IntraMuscular once  insulin lispro (HumaLOG) corrective regimen sliding scale   SubCutaneous three times a day before meals  insulin lispro (HumaLOG) corrective regimen sliding scale   SubCutaneous at bedtime  levothyroxine 75 MICROGram(s) Oral daily  midodrine 30 milliGRAM(s) Oral three times a day  MIRACLE MOUTHWASH 30 milliLiter(s) Swish and Spit three times a day  Nephro-lynda 1 Tablet(s) Oral daily  norepinephrine Infusion 0.1 MICROgram(s)/kG/Min (14.063 mL/Hr) IV Continuous <Continuous>  polyethylene glycol 3350 17 Gram(s) Oral daily  senna 2 Tablet(s) Oral at bedtime      VITAL SIGNS (last 24 hrs):  ICU Vital Signs Last 24 Hrs  T(C): 36.2 (23 Feb 2018 09:16), Max: 36.6 (22 Feb 2018 12:00)  T(F): 97.2 (23 Feb 2018 09:16), Max: 97.8 (22 Feb 2018 12:00)  HR: 82 (23 Feb 2018 10:00) (71 - 95)  BP: 125/64 (23 Feb 2018 10:00) (79/45 - 128/68)  BP(mean): 76 (23 Feb 2018 10:00) (56 - 107)  ABP: --  ABP(mean): --  RR: 14 (23 Feb 2018 10:00) (14 - 37)  SpO2: 97% (23 Feb 2018 10:00) (81% - 100%)      PHYSICAL EXAM:  GENERAL:  ill-appearing, NAD, stable  HEENT:  AT/NC; EOMI, PERRLA, conjunctiva and sclera clear; hearing grossly intact  NECK: Supple, non tender  CHEST/LUNG: Right upper chest permacath; Course crackles throughout lungs; saturating >95% on venturi mask 50% FiO2  HEART: Normal rate, regular rhythm; No murmurs, rubs, or gallops; JVD to mandible, 1+ B/L LE edema  ABDOMEN: soft, NTTP, nondistended, normoactive BS  MSK/EXTREMITIES:  Grossly normal strength, movement, tone, and ROM; Palpable peripheral pulses      WEIGHT:   Admission Weight (kg): 75.3 (02-18-18)  Daily     I/O SUMMARY:    02-22-18 @ 07:01  -  02-23-18 @ 07:00  --------------------------------------------------------  IN: 1102.4 mL / OUT: 0 mL / NET: 1102.4 mL        LABS:                         11.2   4.18  >-----< 103           ( 02-23-18 @ 05:23 )             35.7       131  |  93  |   35  ----------------------< 118    (02-23-18 @ 05:23)     4.0  |  24  |  6.47    Anion Gap: --    Ca   8.2   (02-23-18 @ 05:23)  Mg   2.0   (02-23-18 @ 05:23)  Phos 4.1   (02-23-18 @ 05:23)       TP 8.2     |  AST 2.7    -------------------------     Alb x     |  ALT x               (02-23-18 @ 05:23)  -------------------------     T-bili 2.7  |  AlkPh 211    D-bili x   COAGULATION STUDIES:     aPTT  x        (02-23-18 @ 05:23)     PT     24.3 SEC    (02-23-18 @ 05:23)     INR    2.08          (02-23-18 @ 05:23), COAGULATION STUDIES:     aPTT  x        (02-22-18 @ 05:30)     PT     23.4 SEC    (02-22-18 @ 05:30)     INR    2.08          (02-22-18 @ 05:30)     POCT Blood Glucose.: 124 mg/dL (02-23-18 @ 08:10)  POCT Blood Glucose.: 108 mg/dL (02-22-18 @ 22:12)  POCT Blood Glucose.: 134 mg/dL (02-22-18 @ 18:14)  POCT Blood Glucose.: 161 mg/dL (02-22-18 @ 11:52)        MICROBIOLOGY:      ECHO:       RADIOLOGY & ADDITIONAL TESTS:

## 2018-02-23 NOTE — PROGRESS NOTE ADULT - ASSESSMENT
62y Male with ESRD on HD MWF, NICM with severe LV dysfnxn (EF 21%), s/p biotronic ICD, on home dobutamine drip, Afib on coumadin, COPD, pulm fibrosis on 3-4L home O2, DM, and chronic hypotension on midodrine admitted with SOB 2/2 pulm edema and Flu.     labs reviewed

## 2018-02-23 NOTE — PROGRESS NOTE ADULT - PROBLEM SELECTOR PLAN 1
Maintenance HD schedule MWF.  completed HD earlier today, Rx sheet reviewed, net UF 1L, uneventful   Electrolytes acceptable. chronic volume overload 2/2 severe CMP  Continue midodrine.   c/w renal diet, fluid restriction

## 2018-02-23 NOTE — PROGRESS NOTE ADULT - PROBLEM SELECTOR PLAN 1
.  - Acute on chronic HoTN (baseline SBP 90s-100s). Initial drop in BP was likely 2/2 volume depletion in the setting of Influenza.  - Goal SBP 85, per cardiology.  - Levophed gtt started 2/17, D/C'ed 2/22. BP stable.  - c/w home Midodrine dose 30mg TID (90/day)  - HD MWF

## 2018-02-23 NOTE — PROGRESS NOTE ADULT - ASSESSMENT
63yo M with ESRD (M/W/F), NICM (EF 21%, s/p ICD, PICC with home dobutamine gtt), AFib (on coumadin), COPD on home O2 (4-5L), pulmonary fibrosis, hypotension (on midodrine) admitted with Acute on Chronic Respiratory Failure 2/2 influenza B infection s/p tamiflu x 5d exacerbating underlying pulmonary fibrosis/COPD, transferred to CCU for acute on chronic hypotension, off levophed gtt.

## 2018-02-23 NOTE — PROGRESS NOTE ADULT - PROBLEM SELECTOR PLAN 2
Chronic hypotension, bp improved, stable. off levophed  Continue midodrine, dobutamine   Low BP limiting target UF goal.

## 2018-02-23 NOTE — PROGRESS NOTE ADULT - PROBLEM SELECTOR PLAN 9
.  - DVT: Coumadin, goal INR 2-3.  - DISPO: transfer to medicine tele unit, likely home when stable; pt refuses palliative/hospice consult.     Sean Martínez MD  PGY-1 | Internal Medicine  [phone] / [phone]

## 2018-02-23 NOTE — PROGRESS NOTE ADULT - SUBJECTIVE AND OBJECTIVE BOX
Pt seen and examined at bedside, in CCU    No new complaints today. SOB better. tolerated hd well in AM    Allergies:  No Known Allergies    Hospital Medications:   MEDICATIONS  (STANDING):  ALBUTerol/ipratropium for Nebulization 3 milliLiter(s) Nebulizer every 6 hours  amiodarone    Tablet 100 milliGRAM(s) Oral daily  aspirin enteric coated 81 milliGRAM(s) Oral daily  atorvastatin 80 milliGRAM(s) Oral at bedtime  buDESOnide   90 MICROgram(s) Inhaler 2 Puff(s) Inhalation two times a day  chlorhexidine 4% Liquid 1 Application(s) Topical daily  dextrose 5%. 1000 milliLiter(s) (50 mL/Hr) IV Continuous <Continuous>  dextrose 5%. 1000 milliLiter(s) (50 mL/Hr) IV Continuous <Continuous>  dextrose 50% Injectable 12.5 Gram(s) IV Push once  dextrose 50% Injectable 25 Gram(s) IV Push once  dextrose 50% Injectable 25 Gram(s) IV Push once  DOBUTamine Infusion 7.5 MICROgram(s)/kG/Min (16.942 mL/Hr) IV Continuous <Continuous>  docusate sodium 100 milliGRAM(s) Oral two times a day  finasteride 5 milliGRAM(s) Oral daily  influenza   Vaccine 0.5 milliLiter(s) IntraMuscular once  insulin lispro (HumaLOG) corrective regimen sliding scale   SubCutaneous three times a day before meals  insulin lispro (HumaLOG) corrective regimen sliding scale   SubCutaneous at bedtime  levothyroxine 75 MICROGram(s) Oral daily  midodrine 30 milliGRAM(s) Oral three times a day  MIRACLE MOUTHWASH 30 milliLiter(s) Swish and Spit three times a day  Nephro-lynda 1 Tablet(s) Oral daily  norepinephrine Infusion 0.1 MICROgram(s)/kG/Min (14.063 mL/Hr) IV Continuous <Continuous>  polyethylene glycol 3350 17 Gram(s) Oral daily  senna 2 Tablet(s) Oral at bedtime  warfarin 3 milliGRAM(s) Oral once    VITALS:  Vital Signs Last 24 Hrs  T(C): 35.8 (23 Feb 2018 12:00), Max: 36.6 (23 Feb 2018 04:00)  T(F): 96.5 (23 Feb 2018 12:00), Max: 97.8 (23 Feb 2018 04:00)  HR: 76 (23 Feb 2018 14:46) (71 - 95)  BP: 94/59 (23 Feb 2018 14:00) (93/56 - 128/68)  BP(mean): 68 (23 Feb 2018 14:00) (64 - 107)  RR: 15 (23 Feb 2018 14:00) (14 - 37)  SpO2: 96% (23 Feb 2018 14:46) (81% - 99%)    I&O's Summary    22 Feb 2018 07:01  -  23 Feb 2018 07:00  --------------------------------------------------------  IN: 1102.4 mL / OUT: 0 mL / NET: 1102.4 mL    23 Feb 2018 07:01  -  23 Feb 2018 15:02  --------------------------------------------------------  IN: 1095.2 mL / OUT: 1600 mL / NET: -504.8 mL    PHYSICAL EXAM:  Constitutional: NAD  HEENT: anicteric sclera, oropharynx clear, MMM  Neck: + JVD  Respiratory: Bibasilar crackles. No wheeze   Cardiovascular: S1, S2, RRR  Gastrointestinal: BS+, soft, NT/ND  Extremities: No cyanosis or clubbing. 1+ peripheral LE edema  Neurological: A/O x 3, no focal deficits  Psychiatric: Normal mood, normal affect  : No CVA tenderness. No rosa.   Skin: No rashes  Vascular Access: RIJ Tunneled cath.     LABS:  02-23    131<L>  |  93<L>  |  35<H>  ----------------------------<  118<H>  4.0   |  24  |  6.47<H>    Ca    8.2<L>      23 Feb 2018 05:23  Phos  4.1     02-23  Mg     2.0     02-23    TPro  7.8  /  Alb  2.7<L>  /  TBili  0.7  /  DBili  x   /  AST  79<H>  /  ALT  54<H>  /  AlkPhos  211<H>  02-23                        11.2   4.18  )-----------( 103      ( 23 Feb 2018 05:23 )             35.7       Urine Studies:      RADIOLOGY & ADDITIONAL STUDIES:

## 2018-02-23 NOTE — PROGRESS NOTE ADULT - SUBJECTIVE AND OBJECTIVE BOX
Now off Norepi for > 12 hours   On Dobutamine 7.5 mcg/kg/min  BP 93/68  HR 96  O2 Sat on 4LNC 87%  Bilateral crackles  RR 1/6 systolic murmur  trace-1+ edema    INR 2.08    Imp: Hemodynamically improved.  Waiting for improvement in oxygenation back to his baseline in the low 90's  To have HD this morning

## 2018-02-23 NOTE — PROGRESS NOTE ADULT - PROBLEM SELECTOR PLAN 2
.  - TTE Oct 2017: EF 21%, severely dilated LA, mod LV enlargement, Severe global LV syst dysfxn, RV enlargement w/ decreased RV syst fxn, mod TR, mild NE.  - On exam: JVD, peripheral edema, B/L crackles though unclear if there is fluid overlying pulm fibrosis  - c/w dobutamine gtt at home dose of 7.5  - HD MWF -- per Dr. Flores (pt's nephrologist), pt is chronically fluid overloaded 2/2 poor volume restriction adherence

## 2018-02-23 NOTE — PROGRESS NOTE ADULT - SUBJECTIVE AND OBJECTIVE BOX
Follow-up Pulm Progress Note    pt on HD, complain of continued SOB  no new complaints    Admit Diagnosis:  Shortness of breath      Past Medical & Surgical History:  Seizure  Chronic hypotension  AF (atrial fibrillation)  COPD (chronic obstructive pulmonary disease)  HLD (hyperlipidemia)  DM (diabetes mellitus)  ESRD (end stage renal disease) on dialysis  BPH (benign prostatic hypertrophy)  Myocardial infarction  Gout  CHF (congestive heart failure)  AICD (automatic cardioverter/defibrillator) present  H/O coronary angiogram      Medications:  MEDICATIONS  (STANDING):  ALBUTerol/ipratropium for Nebulization 3 milliLiter(s) Nebulizer every 6 hours  amiodarone    Tablet 100 milliGRAM(s) Oral daily  aspirin enteric coated 81 milliGRAM(s) Oral daily  atorvastatin 80 milliGRAM(s) Oral at bedtime  buDESOnide   90 MICROgram(s) Inhaler 2 Puff(s) Inhalation two times a day  chlorhexidine 4% Liquid 1 Application(s) Topical daily  dextrose 5%. 1000 milliLiter(s) (50 mL/Hr) IV Continuous <Continuous>  dextrose 5%. 1000 milliLiter(s) (50 mL/Hr) IV Continuous <Continuous>  dextrose 50% Injectable 12.5 Gram(s) IV Push once  dextrose 50% Injectable 25 Gram(s) IV Push once  dextrose 50% Injectable 25 Gram(s) IV Push once  DOBUTamine Infusion 7.5 MICROgram(s)/kG/Min (16.942 mL/Hr) IV Continuous <Continuous>  docusate sodium 100 milliGRAM(s) Oral two times a day  finasteride 5 milliGRAM(s) Oral daily  influenza   Vaccine 0.5 milliLiter(s) IntraMuscular once  insulin lispro (HumaLOG) corrective regimen sliding scale   SubCutaneous three times a day before meals  insulin lispro (HumaLOG) corrective regimen sliding scale   SubCutaneous at bedtime  levothyroxine 75 MICROGram(s) Oral daily  midodrine 30 milliGRAM(s) Oral three times a day  MIRACLE MOUTHWASH 30 milliLiter(s) Swish and Spit three times a day  Nephro-lynda 1 Tablet(s) Oral daily  norepinephrine Infusion 0.1 MICROgram(s)/kG/Min (14.063 mL/Hr) IV Continuous <Continuous>  polyethylene glycol 3350 17 Gram(s) Oral daily  senna 2 Tablet(s) Oral at bedtime    MEDICATIONS  (PRN):  benzocaine 15 mG/menthol 3.6 mG Lozenge 1 Lozenge Oral every 6 hours PRN Sore Throat  dextrose Gel 1 Dose(s) Oral once PRN Blood Glucose LESS THAN 70 milliGRAM(s)/deciLiter  dextrose Gel 1 Dose(s) Oral once PRN Blood Glucose LESS THAN 70 milliGRAM(s)/deciliter  glucagon  Injectable 1 milliGRAM(s) IntraMuscular once PRN Glucose <70 milliGRAM(s)/deciLiter      Vent settings (if applicable)      Vital Signs Last 24 Hrs  T(C): 36.2 (23 Feb 2018 09:16), Max: 36.6 (22 Feb 2018 12:00)  T(F): 97.2 (23 Feb 2018 09:16), Max: 97.8 (22 Feb 2018 12:00)  HR: 79 (23 Feb 2018 11:00) (71 - 95)  BP: 102/68 (23 Feb 2018 11:00) (79/45 - 128/68)  BP(mean): 76 (23 Feb 2018 11:00) (56 - 107)  RR: 18 (23 Feb 2018 11:00) (14 - 37)  SpO2: 98% (23 Feb 2018 11:00) (81% - 100%)          02-22 @ 07:01  -  02-23 @ 07:00  --------------------------------------------------------  IN: 1102.4 mL / OUT: 0 mL / NET: 1102.4 mL          LABS:                        11.2   4.18  )-----------( 103      ( 23 Feb 2018 05:23 )             35.7     02-23    131<L>  |  93<L>  |  35<H>  ----------------------------<  118<H>  4.0   |  24  |  6.47<H>    Ca    8.2<L>      23 Feb 2018 05:23  Phos  4.1     02-23  Mg     2.0     02-23    TPro  7.8  /  Alb  2.7<L>  /  TBili  0.7  /  DBili  x   /  AST  79<H>  /  ALT  54<H>  /  AlkPhos  211<H>  02-23          Physical Examination:  GEN:  Alert, Verbal, Oriented, NAD  HEENT: N/C A/T No JVD,   PULM: b/l air entry, b/l rales  CVS: Regular rate and rhythm, + murmurs,ABD: Soft, NT, ND + BS  EXT: No C/C + UE and LE edema    RADIOLOGY REVIEWED  CXR: < from: Xray Chest 1 View- PORTABLE-Urgent (02.17.18 @ 18:38) >  MPRESSION:  Redemonstrated left PICC with tip in SVC region. No kinking or   discontinuity along visualized course.    Remainder of the exam is unchanged.    < end of copied text >    TTE:  < from: Transthoracic Echocardiogram (10.18.17 @ 09:48) >  CONCLUSIONS:  1. Normal trileaflet aortic valve.  2. Severely dilated left atrium.  LA volume index = 56 cc/m2.  3. Moderate left ventricular enlargement.  4. Severe global left ventricular systolic dysfunction.  Flattening of the interventricular septum in both systole  and diastole is  consistent with right ventricular pressure  overload.  5. Right ventricular enlargement with decreased right ventricular systolic function. A device wire is noted in the right heart.  6. Normal tricuspid valve.  Moderate tricuspid regurgitation.  7. Normal pulmonic valve. Mild pulmonic regurgitation. Estimated pulmonary artery systolic pressure equals 55 mm Hg, assuming right atrial pressure equals 10  mm Hg, consistent with moderate pulmonary hypertension.  8. Agitated saline injection resulted in a large amount of bubbles seen in the right heart. Although the exact amount of cardiac cycles that occurred prior to the crossing of the bubbles is unclear, the findings are suggestive of a significant shunt. Consider MARGIE for further workup, if clinically warrnated.    < end of copied text >

## 2018-02-23 NOTE — PROGRESS NOTE ADULT - PROBLEM SELECTOR PLAN 1
has underlying pulmonary fibrosis and respiratory failure precipitated by influenza:   Titrate fio2 to n/c as tolerated  NIV PRN

## 2018-02-24 LAB
BASOPHILS # BLD AUTO: 0.02 K/UL — SIGNIFICANT CHANGE UP (ref 0–0.2)
BASOPHILS NFR BLD AUTO: 0.3 % — SIGNIFICANT CHANGE UP (ref 0–2)
BUN SERPL-MCNC: 24 MG/DL — HIGH (ref 7–23)
CALCIUM SERPL-MCNC: 8 MG/DL — LOW (ref 8.4–10.5)
CHLORIDE SERPL-SCNC: 92 MMOL/L — LOW (ref 98–107)
CO2 SERPL-SCNC: 25 MMOL/L — SIGNIFICANT CHANGE UP (ref 22–31)
CREAT SERPL-MCNC: 4.87 MG/DL — HIGH (ref 0.5–1.3)
EOSINOPHIL # BLD AUTO: 0.13 K/UL — SIGNIFICANT CHANGE UP (ref 0–0.5)
EOSINOPHIL NFR BLD AUTO: 2.2 % — SIGNIFICANT CHANGE UP (ref 0–6)
GLUCOSE SERPL-MCNC: 54 MG/DL — LOW (ref 70–99)
HCT VFR BLD CALC: 34.2 % — LOW (ref 39–50)
HGB BLD-MCNC: 10.9 G/DL — LOW (ref 13–17)
IMM GRANULOCYTES # BLD AUTO: 0.03 # — SIGNIFICANT CHANGE UP
IMM GRANULOCYTES NFR BLD AUTO: 0.5 % — SIGNIFICANT CHANGE UP (ref 0–1.5)
INR BLD: 2.19 — HIGH (ref 0.88–1.17)
LYMPHOCYTES # BLD AUTO: 0.82 K/UL — LOW (ref 1–3.3)
LYMPHOCYTES # BLD AUTO: 13.7 % — SIGNIFICANT CHANGE UP (ref 13–44)
MAGNESIUM SERPL-MCNC: 1.9 MG/DL — SIGNIFICANT CHANGE UP (ref 1.6–2.6)
MCHC RBC-ENTMCNC: 30.7 PG — SIGNIFICANT CHANGE UP (ref 27–34)
MCHC RBC-ENTMCNC: 31.9 % — LOW (ref 32–36)
MCV RBC AUTO: 96.3 FL — SIGNIFICANT CHANGE UP (ref 80–100)
MONOCYTES # BLD AUTO: 0.91 K/UL — HIGH (ref 0–0.9)
MONOCYTES NFR BLD AUTO: 15.2 % — HIGH (ref 2–14)
NEUTROPHILS # BLD AUTO: 4.09 K/UL — SIGNIFICANT CHANGE UP (ref 1.8–7.4)
NEUTROPHILS NFR BLD AUTO: 68.1 % — SIGNIFICANT CHANGE UP (ref 43–77)
NRBC # FLD: 0 — SIGNIFICANT CHANGE UP
PLATELET # BLD AUTO: 101 K/UL — LOW (ref 150–400)
PMV BLD: 12.9 FL — SIGNIFICANT CHANGE UP (ref 7–13)
POTASSIUM SERPL-MCNC: 3.8 MMOL/L — SIGNIFICANT CHANGE UP (ref 3.5–5.3)
POTASSIUM SERPL-SCNC: 3.8 MMOL/L — SIGNIFICANT CHANGE UP (ref 3.5–5.3)
PROTHROM AB SERPL-ACNC: 24.7 SEC — HIGH (ref 9.8–13.1)
RBC # BLD: 3.55 M/UL — LOW (ref 4.2–5.8)
RBC # FLD: 16.4 % — HIGH (ref 10.3–14.5)
SODIUM SERPL-SCNC: 130 MMOL/L — LOW (ref 135–145)
WBC # BLD: 6 K/UL — SIGNIFICANT CHANGE UP (ref 3.8–10.5)
WBC # FLD AUTO: 6 K/UL — SIGNIFICANT CHANGE UP (ref 3.8–10.5)

## 2018-02-24 RX ORDER — WARFARIN SODIUM 2.5 MG/1
3 TABLET ORAL ONCE
Qty: 0 | Refills: 0 | Status: COMPLETED | OUTPATIENT
Start: 2018-02-24 | End: 2018-02-24

## 2018-02-24 RX ADMIN — Medication 16.94 MICROGRAM(S)/KG/MIN: at 06:59

## 2018-02-24 RX ADMIN — Medication 3 MILLILITER(S): at 03:50

## 2018-02-24 RX ADMIN — Medication 16.94 MICROGRAM(S)/KG/MIN: at 21:22

## 2018-02-24 RX ADMIN — AMIODARONE HYDROCHLORIDE 100 MILLIGRAM(S): 400 TABLET ORAL at 06:10

## 2018-02-24 RX ADMIN — Medication 2: at 16:46

## 2018-02-24 RX ADMIN — MIDODRINE HYDROCHLORIDE 30 MILLIGRAM(S): 2.5 TABLET ORAL at 13:37

## 2018-02-24 RX ADMIN — WARFARIN SODIUM 3 MILLIGRAM(S): 2.5 TABLET ORAL at 18:40

## 2018-02-24 RX ADMIN — Medication 1 TABLET(S): at 13:37

## 2018-02-24 RX ADMIN — CHLORHEXIDINE GLUCONATE 1 APPLICATION(S): 213 SOLUTION TOPICAL at 14:48

## 2018-02-24 RX ADMIN — DIPHENHYDRAMINE HYDROCHLORIDE AND LIDOCAINE HYDROCHLORIDE AND ALUMINUM HYDROXIDE AND MAGNESIUM HYDRO 30 MILLILITER(S): KIT at 13:38

## 2018-02-24 RX ADMIN — MIDODRINE HYDROCHLORIDE 30 MILLIGRAM(S): 2.5 TABLET ORAL at 06:10

## 2018-02-24 RX ADMIN — Medication 100 MILLIGRAM(S): at 06:05

## 2018-02-24 RX ADMIN — Medication 2 PUFF(S): at 21:23

## 2018-02-24 RX ADMIN — Medication 75 MICROGRAM(S): at 06:09

## 2018-02-24 RX ADMIN — FINASTERIDE 5 MILLIGRAM(S): 5 TABLET, FILM COATED ORAL at 13:37

## 2018-02-24 RX ADMIN — Medication 2 PUFF(S): at 10:25

## 2018-02-24 RX ADMIN — ATORVASTATIN CALCIUM 80 MILLIGRAM(S): 80 TABLET, FILM COATED ORAL at 21:23

## 2018-02-24 RX ADMIN — DIPHENHYDRAMINE HYDROCHLORIDE AND LIDOCAINE HYDROCHLORIDE AND ALUMINUM HYDROXIDE AND MAGNESIUM HYDRO 30 MILLILITER(S): KIT at 21:24

## 2018-02-24 RX ADMIN — MIDODRINE HYDROCHLORIDE 30 MILLIGRAM(S): 2.5 TABLET ORAL at 21:21

## 2018-02-24 RX ADMIN — Medication 81 MILLIGRAM(S): at 13:37

## 2018-02-24 RX ADMIN — Medication 3 MILLILITER(S): at 21:36

## 2018-02-24 RX ADMIN — Medication 3 MILLILITER(S): at 10:04

## 2018-02-24 RX ADMIN — DIPHENHYDRAMINE HYDROCHLORIDE AND LIDOCAINE HYDROCHLORIDE AND ALUMINUM HYDROXIDE AND MAGNESIUM HYDRO 30 MILLILITER(S): KIT at 06:10

## 2018-02-24 NOTE — PROGRESS NOTE ADULT - ASSESSMENT
Blood pressure is improved appears to be at baseline.  Await improvement in oxygenation.  Hemodynamically stable.  Continue current medications.

## 2018-02-24 NOTE — PROGRESS NOTE ADULT - SUBJECTIVE AND OBJECTIVE BOX
Patient seen and examined  complains of some sob  dfenies any chest pain    No Known Allergies    Hospital Medications:   MEDICATIONS  (STANDING):  ALBUTerol/ipratropium for Nebulization 3 milliLiter(s) Nebulizer every 6 hours  amiodarone    Tablet 100 milliGRAM(s) Oral daily  aspirin enteric coated 81 milliGRAM(s) Oral daily  atorvastatin 80 milliGRAM(s) Oral at bedtime  buDESOnide   90 MICROgram(s) Inhaler 2 Puff(s) Inhalation two times a day  chlorhexidine 4% Liquid 1 Application(s) Topical daily  dextrose 5%. 1000 milliLiter(s) (50 mL/Hr) IV Continuous <Continuous>  dextrose 5%. 1000 milliLiter(s) (50 mL/Hr) IV Continuous <Continuous>  dextrose 50% Injectable 12.5 Gram(s) IV Push once  dextrose 50% Injectable 25 Gram(s) IV Push once  dextrose 50% Injectable 25 Gram(s) IV Push once  DOBUTamine Infusion 7.5 MICROgram(s)/kG/Min (16.942 mL/Hr) IV Continuous <Continuous>  docusate sodium 100 milliGRAM(s) Oral two times a day  finasteride 5 milliGRAM(s) Oral daily  influenza   Vaccine 0.5 milliLiter(s) IntraMuscular once  insulin lispro (HumaLOG) corrective regimen sliding scale   SubCutaneous three times a day before meals  insulin lispro (HumaLOG) corrective regimen sliding scale   SubCutaneous at bedtime  levothyroxine 75 MICROGram(s) Oral daily  midodrine 30 milliGRAM(s) Oral three times a day  MIRACLE MOUTHWASH 30 milliLiter(s) Swish and Spit three times a day  Nephro-lynda 1 Tablet(s) Oral daily  polyethylene glycol 3350 17 Gram(s) Oral daily  senna 2 Tablet(s) Oral at bedtime  warfarin 3 milliGRAM(s) Oral once      VITALS:  T(F): 97.1 (02-24-18 @ 13:00), Max: 97.5 (02-23-18 @ 16:30)  HR: 81 (02-24-18 @ 13:00)  BP: 136/67 (02-24-18 @ 13:00)  RR: 18 (02-24-18 @ 13:00)  SpO2: 100% (02-24-18 @ 13:00)  Wt(kg): --    02-23 @ 07:01 - 02-24 @ 07:00  --------------------------------------------------------  IN: 1282.8 mL / OUT: 1600 mL / NET: -317.2 mL        PHYSICAL EXAM:  Constitutional: NAD  HEENT: anicteric sclera, oropharynx clear, MMM  Neck: + JVD  Respiratory: Bibasilar crackles. No wheeze   Cardiovascular: S1, S2, RRR  Gastrointestinal: BS+, soft, NT/ND  Extremities: No cyanosis or clubbing. 1+ peripheral LE edema  Neurological: A/O x 3, no focal deficits  Psychiatric: Normal mood, normal affect  : No CVA tenderness. No rosa.   Skin: No rashes  Vascular Access: RIJ Tunneled cath.     LABS:  02-24    130<L>  |  92<L>  |  24<H>  ----------------------------<  54<L>  3.8   |  25  |  4.87<H>    Ca    8.0<L>      24 Feb 2018 05:30  Phos  4.1     02-23  Mg     1.9     02-24    TPro  7.8  /  Alb  2.7<L>  /  TBili  0.7  /  DBili      /  AST  79<H>  /  ALT  54<H>  /  AlkPhos  211<H>  02-23    Creatinine Trend: 4.87 <--, 6.47 <--, 5.48 <--, 6.31 <--, 5.28 <--, 7.09 <--, 7.03 <--, 5.15 <--, 5.71 <--                        10.9   6.00  )-----------( 101      ( 24 Feb 2018 05:30 )             34.2     Urine Studies:      RADIOLOGY & ADDITIONAL STUDIES:

## 2018-02-24 NOTE — PROGRESS NOTE ADULT - PROBLEM SELECTOR PLAN 1
Maintenance HD schedule MWF.  completed HD yesterday- Rx sheet reviewed, net UF 1L, uneventful   Electrolytes acceptable. chronic volume overload 2/2 severe CMP  Continue midodrine.   c/w renal diet, fluid restriction  will plan for UF session  today given sob  will monitor

## 2018-02-24 NOTE — PROGRESS NOTE ADULT - SUBJECTIVE AND OBJECTIVE BOX
SUBJECTIVE: Patient resting comfortably on dobutamine drip and ventimask.  Denies chest pain  	  MEDICATIONS:  amiodarone    Tablet 100 milliGRAM(s) Oral daily  DOBUTamine Infusion 7.5 MICROgram(s)/kG/Min IV Continuous <Continuous>  midodrine 30 milliGRAM(s) Oral three times a day      ALBUTerol/ipratropium for Nebulization 3 milliLiter(s) Nebulizer every 6 hours  buDESOnide   90 MICROgram(s) Inhaler 2 Puff(s) Inhalation two times a day  guaiFENesin    Syrup 100 milliGRAM(s) Oral every 6 hours PRN      docusate sodium 100 milliGRAM(s) Oral two times a day  polyethylene glycol 3350 17 Gram(s) Oral daily  senna 2 Tablet(s) Oral at bedtime    atorvastatin 80 milliGRAM(s) Oral at bedtime  dextrose 50% Injectable 12.5 Gram(s) IV Push once  dextrose 50% Injectable 25 Gram(s) IV Push once  dextrose 50% Injectable 25 Gram(s) IV Push once  dextrose Gel 1 Dose(s) Oral once PRN  dextrose Gel 1 Dose(s) Oral once PRN  finasteride 5 milliGRAM(s) Oral daily  glucagon  Injectable 1 milliGRAM(s) IntraMuscular once PRN  insulin lispro (HumaLOG) corrective regimen sliding scale   SubCutaneous three times a day before meals  insulin lispro (HumaLOG) corrective regimen sliding scale   SubCutaneous at bedtime  levothyroxine 75 MICROGram(s) Oral daily    aspirin enteric coated 81 milliGRAM(s) Oral daily  benzocaine 15 mG/menthol 3.6 mG Lozenge 1 Lozenge Oral every 6 hours PRN  chlorhexidine 4% Liquid 1 Application(s) Topical daily  dextrose 5%. 1000 milliLiter(s) IV Continuous <Continuous>  dextrose 5%. 1000 milliLiter(s) IV Continuous <Continuous>  influenza   Vaccine 0.5 milliLiter(s) IntraMuscular once  MIRACLE MOUTHWASH 30 milliLiter(s) Swish and Spit three times a day  Nephro-lynda 1 Tablet(s) Oral daily      REVIEW OF SYSTEMS:    CONSTITUTIONAL: No fever, weight loss, or fatigue  EYES: No eye pain, visual disturbances, or discharge  NECK: No pain or stiffness  RESPIRATORY: No cough, wheezing, chills or hemoptysis; No Shortness of Breath  CARDIOVASCULAR: No chest pain, palpitations, dizziness, or leg swelling  GASTROINTESTINAL: No abdominal or epigastric pain. No nausea, vomiting, or hematemesis; No diarrhea or constipation. No melena or hematochezia.  GENITOURINARY: No dysuria, frequency, hematuria, or incontinence  NEUROLOGICAL: No headaches, memory loss, loss of strength, numbness, or tremors  SKIN: No itching, burning, rashes, or lesions   LYMPH Nodes: No enlarged glands  MUSCULOSKELETAL: No joint pain or swelling; No muscle, back, or extremity pain  All other review of systems are negative.  	  [ ] Unable to obtain    PHYSICAL EXAM:  T(C): 36.2 (02-24-18 @ 05:11), Max: 36.4 (02-23-18 @ 16:30)  HR: 81 (02-24-18 @ 07:54) (72 - 95)  BP: 101/59 (02-24-18 @ 05:11) (87/74 - 125/64)  RR: 20 (02-24-18 @ 05:11) (14 - 32)  SpO2: 95% (02-24-18 @ 07:54) (88% - 99%)  Wt(kg): --  I&O's Summary    23 Feb 2018 07:01  -  24 Feb 2018 07:00  --------------------------------------------------------  IN: 1282.8 mL / OUT: 1600 mL / NET: -317.2 mL          PHYSICAL EXAM    Appearance: Normal	  HEENT:   Normal oral mucosa, PERRL, EOMI	  NECK: Soft and supple, No LAD, No JVD  Cardiovascular: Regular Rate and Rhythm, Normal S1 S2, No murmurs, No clicks, gallops or rubs  Respiratory: Lungs bilateral crackles  Gastrointestinal:  Soft, Non-tender, + BS	  Skin: No rashes, No ecchymoses, No cyanosis  Neurologic: Non-focal  Extremities: trace edema  Vascular: Peripheral pulses palpable 2+ bilaterally    TELEMETRY: 	NSR    	    LABS:	 	                     10.9   6.00  )-----------( 101      ( 24 Feb 2018 05:30 )             34.2     02-24    130<L>  |  92<L>  |  24<H>  ----------------------------<  54<L>  3.8   |  25  |  4.87<H>    Ca    8.0<L>      24 Feb 2018 05:30  Phos  4.1     02-23  Mg     1.9     02-24    TPro  7.8  /  Alb  2.7<L>  /  TBili  0.7  /  DBili  x   /  AST  79<H>  /  ALT  54<H>  /  AlkPhos  211<H>  02-23

## 2018-02-25 LAB
APTT BLD: 58.1 SEC — HIGH (ref 27.5–37.4)
BUN SERPL-MCNC: 37 MG/DL — HIGH (ref 7–23)
CALCIUM SERPL-MCNC: 8.5 MG/DL — SIGNIFICANT CHANGE UP (ref 8.4–10.5)
CHLORIDE SERPL-SCNC: 90 MMOL/L — LOW (ref 98–107)
CO2 SERPL-SCNC: 24 MMOL/L — SIGNIFICANT CHANGE UP (ref 22–31)
CREAT SERPL-MCNC: 5.93 MG/DL — HIGH (ref 0.5–1.3)
GLUCOSE SERPL-MCNC: 182 MG/DL — HIGH (ref 70–99)
HCT VFR BLD CALC: 34.8 % — LOW (ref 39–50)
HGB BLD-MCNC: 11.1 G/DL — LOW (ref 13–17)
INR BLD: 2.68 — HIGH (ref 0.88–1.17)
MAGNESIUM SERPL-MCNC: 2 MG/DL — SIGNIFICANT CHANGE UP (ref 1.6–2.6)
MCHC RBC-ENTMCNC: 30 PG — SIGNIFICANT CHANGE UP (ref 27–34)
MCHC RBC-ENTMCNC: 31.9 % — LOW (ref 32–36)
MCV RBC AUTO: 94.1 FL — SIGNIFICANT CHANGE UP (ref 80–100)
NRBC # FLD: 0 — SIGNIFICANT CHANGE UP
PHOSPHATE SERPL-MCNC: 4.1 MG/DL — SIGNIFICANT CHANGE UP (ref 2.5–4.5)
PLATELET # BLD AUTO: 107 K/UL — LOW (ref 150–400)
PMV BLD: 13.7 FL — HIGH (ref 7–13)
POTASSIUM SERPL-MCNC: 4.2 MMOL/L — SIGNIFICANT CHANGE UP (ref 3.5–5.3)
POTASSIUM SERPL-SCNC: 4.2 MMOL/L — SIGNIFICANT CHANGE UP (ref 3.5–5.3)
PROTHROM AB SERPL-ACNC: 30.4 SEC — HIGH (ref 9.8–13.1)
RBC # BLD: 3.7 M/UL — LOW (ref 4.2–5.8)
RBC # FLD: 16.4 % — HIGH (ref 10.3–14.5)
SODIUM SERPL-SCNC: 129 MMOL/L — LOW (ref 135–145)
WBC # BLD: 5.69 K/UL — SIGNIFICANT CHANGE UP (ref 3.8–10.5)
WBC # FLD AUTO: 5.69 K/UL — SIGNIFICANT CHANGE UP (ref 3.8–10.5)

## 2018-02-25 RX ORDER — WARFARIN SODIUM 2.5 MG/1
2.5 TABLET ORAL ONCE
Qty: 0 | Refills: 0 | Status: COMPLETED | OUTPATIENT
Start: 2018-02-25 | End: 2018-02-25

## 2018-02-25 RX ADMIN — Medication 16.94 MICROGRAM(S)/KG/MIN: at 21:19

## 2018-02-25 RX ADMIN — Medication 3 MILLILITER(S): at 04:31

## 2018-02-25 RX ADMIN — Medication 2 PUFF(S): at 21:20

## 2018-02-25 RX ADMIN — ATORVASTATIN CALCIUM 80 MILLIGRAM(S): 80 TABLET, FILM COATED ORAL at 21:20

## 2018-02-25 RX ADMIN — CHLORHEXIDINE GLUCONATE 1 APPLICATION(S): 213 SOLUTION TOPICAL at 12:53

## 2018-02-25 RX ADMIN — Medication 3 MILLILITER(S): at 09:39

## 2018-02-25 RX ADMIN — MIDODRINE HYDROCHLORIDE 30 MILLIGRAM(S): 2.5 TABLET ORAL at 05:20

## 2018-02-25 RX ADMIN — Medication 2: at 22:53

## 2018-02-25 RX ADMIN — MIDODRINE HYDROCHLORIDE 30 MILLIGRAM(S): 2.5 TABLET ORAL at 21:20

## 2018-02-25 RX ADMIN — DIPHENHYDRAMINE HYDROCHLORIDE AND LIDOCAINE HYDROCHLORIDE AND ALUMINUM HYDROXIDE AND MAGNESIUM HYDRO 30 MILLILITER(S): KIT at 14:01

## 2018-02-25 RX ADMIN — Medication 100 MILLIGRAM(S): at 17:09

## 2018-02-25 RX ADMIN — Medication 100 MILLIGRAM(S): at 05:21

## 2018-02-25 RX ADMIN — Medication 2 PUFF(S): at 09:07

## 2018-02-25 RX ADMIN — SENNA PLUS 2 TABLET(S): 8.6 TABLET ORAL at 21:20

## 2018-02-25 RX ADMIN — Medication 1 TABLET(S): at 12:52

## 2018-02-25 RX ADMIN — Medication 4: at 09:06

## 2018-02-25 RX ADMIN — AMIODARONE HYDROCHLORIDE 100 MILLIGRAM(S): 400 TABLET ORAL at 05:20

## 2018-02-25 RX ADMIN — FINASTERIDE 5 MILLIGRAM(S): 5 TABLET, FILM COATED ORAL at 12:52

## 2018-02-25 RX ADMIN — Medication 75 MICROGRAM(S): at 05:19

## 2018-02-25 RX ADMIN — MIDODRINE HYDROCHLORIDE 30 MILLIGRAM(S): 2.5 TABLET ORAL at 12:52

## 2018-02-25 RX ADMIN — Medication 16.94 MICROGRAM(S)/KG/MIN: at 05:19

## 2018-02-25 RX ADMIN — DIPHENHYDRAMINE HYDROCHLORIDE AND LIDOCAINE HYDROCHLORIDE AND ALUMINUM HYDROXIDE AND MAGNESIUM HYDRO 30 MILLILITER(S): KIT at 05:22

## 2018-02-25 RX ADMIN — Medication 6: at 12:54

## 2018-02-25 RX ADMIN — WARFARIN SODIUM 2.5 MILLIGRAM(S): 2.5 TABLET ORAL at 17:09

## 2018-02-25 RX ADMIN — Medication 3 MILLILITER(S): at 22:38

## 2018-02-25 RX ADMIN — DIPHENHYDRAMINE HYDROCHLORIDE AND LIDOCAINE HYDROCHLORIDE AND ALUMINUM HYDROXIDE AND MAGNESIUM HYDRO 30 MILLILITER(S): KIT at 21:21

## 2018-02-25 RX ADMIN — Medication 81 MILLIGRAM(S): at 12:52

## 2018-02-25 RX ADMIN — Medication 5: at 18:01

## 2018-02-25 NOTE — PROGRESS NOTE ADULT - SUBJECTIVE AND OBJECTIVE BOX
SUBJECTIVE: Patient resting comfortably on dobutamine drip and ventimask.  Denies chest pain.  Breathing much better  	  MEDICATIONS:  MEDICATIONS  (STANDING):  ALBUTerol/ipratropium for Nebulization 3 milliLiter(s) Nebulizer every 6 hours  amiodarone    Tablet 100 milliGRAM(s) Oral daily  aspirin enteric coated 81 milliGRAM(s) Oral daily  atorvastatin 80 milliGRAM(s) Oral at bedtime  buDESOnide   90 MICROgram(s) Inhaler 2 Puff(s) Inhalation two times a day  chlorhexidine 4% Liquid 1 Application(s) Topical daily  dextrose 5%. 1000 milliLiter(s) (50 mL/Hr) IV Continuous <Continuous>  dextrose 5%. 1000 milliLiter(s) (50 mL/Hr) IV Continuous <Continuous>  dextrose 50% Injectable 12.5 Gram(s) IV Push once  dextrose 50% Injectable 25 Gram(s) IV Push once  dextrose 50% Injectable 25 Gram(s) IV Push once  DOBUTamine Infusion 7.5 MICROgram(s)/kG/Min (16.942 mL/Hr) IV Continuous <Continuous>  docusate sodium 100 milliGRAM(s) Oral two times a day  finasteride 5 milliGRAM(s) Oral daily  influenza   Vaccine 0.5 milliLiter(s) IntraMuscular once  insulin lispro (HumaLOG) corrective regimen sliding scale   SubCutaneous three times a day before meals  insulin lispro (HumaLOG) corrective regimen sliding scale   SubCutaneous at bedtime  levothyroxine 75 MICROGram(s) Oral daily  midodrine 30 milliGRAM(s) Oral three times a day  MIRACLE MOUTHWASH 30 milliLiter(s) Swish and Spit three times a day  Nephro-lynda 1 Tablet(s) Oral daily  polyethylene glycol 3350 17 Gram(s) Oral daily  senna 2 Tablet(s) Oral at bedtime    MEDICATIONS  (PRN):  benzocaine 15 mG/menthol 3.6 mG Lozenge 1 Lozenge Oral every 6 hours PRN Sore Throat  dextrose Gel 1 Dose(s) Oral once PRN Blood Glucose LESS THAN 70 milliGRAM(s)/deciLiter  dextrose Gel 1 Dose(s) Oral once PRN Blood Glucose LESS THAN 70 milliGRAM(s)/deciliter  glucagon  Injectable 1 milliGRAM(s) IntraMuscular once PRN Glucose <70 milliGRAM(s)/deciLiter  guaiFENesin    Syrup 100 milliGRAM(s) Oral every 6 hours PRN Cough          REVIEW OF SYSTEMS:    CONSTITUTIONAL: No fever, weight loss, or fatigue  EYES: No eye pain, visual disturbances, or discharge  NECK: No pain or stiffness  RESPIRATORY: No cough, wheezing, chills or hemoptysis; No Shortness of Breath  CARDIOVASCULAR: No chest pain, palpitations, dizziness, or leg swelling  GASTROINTESTINAL: No abdominal or epigastric pain. No nausea, vomiting, or hematemesis; No diarrhea or constipation. No melena or hematochezia.  GENITOURINARY: No dysuria, frequency, hematuria, or incontinence  NEUROLOGICAL: No headaches, memory loss, loss of strength, numbness, or tremors  SKIN: No itching, burning, rashes, or lesions   LYMPH Nodes: No enlarged glands  MUSCULOSKELETAL: No joint pain or swelling; No muscle, back, or extremity pain  All other review of systems are negative.  	  [ ] Unable to obtain    PHYSICAL EXAM:  T(F): 97.5 (02-25-18 @ 08:39), Max: 97.9 (02-24-18 @ 20:21)  HR: 83 (02-25-18 @ 08:39) (62 - 87)  BP: 111/63 (02-25-18 @ 08:39) (104/52 - 136/67)  RR: 18 (02-25-18 @ 08:39) (18 - 20)  SpO2: 98% (02-25-18 @ 08:39) (92% - 100%)  Wt(kg): --  ,   I&O's Summary    24 Feb 2018 07:01  -  25 Feb 2018 07:00  --------------------------------------------------------  IN: 400 mL / OUT: 1900 mL / NET: -1500 mL              PHYSICAL EXAM    Appearance: Normal	  HEENT:   Normal oral mucosa, PERRL, EOMI	  NECK: Soft and supple, No LAD, No JVD  Cardiovascular: Regular Rate and Rhythm, Normal S1 S2, No murmurs, No clicks, gallops or rubs  Respiratory: Lungs Clear  Gastrointestinal:  Soft, Non-tender, + BS	  Skin: No rashes, No ecchymoses, No cyanosis  Neurologic: Non-focal  Extremities: trace edema  Vascular: Peripheral pulses palpable 2+ bilaterally    TELEMETRY: 	NSR    	    LABS:	 	                          11.1   5.69  )-----------( 107      ( 25 Feb 2018 05:26 )             34.8               02-25    129<L>  |  90<L>  |  37<H>  ----------------------------<  182<H>  4.2   |  24  |  5.93<H>    Ca    8.5      25 Feb 2018 05:26  Phos  4.1     02-25  Mg     2.0     02-25      PT/INR - ( 24 Feb 2018 05:30 )   PT: 24.7 SEC;   INR: 2.19

## 2018-02-25 NOTE — PROGRESS NOTE ADULT - ASSESSMENT
Blood pressure is improved appears to be at baseline.  Oxygenation has improved.  Patient is on home dobutamine  Hemodynamically stable.  Continue current medications.  INR is therapeutic.  Discharge planning.

## 2018-02-26 DIAGNOSIS — I50.22 CHRONIC SYSTOLIC (CONGESTIVE) HEART FAILURE: ICD-10-CM

## 2018-02-26 DIAGNOSIS — J44.9 CHRONIC OBSTRUCTIVE PULMONARY DISEASE, UNSPECIFIED: ICD-10-CM

## 2018-02-26 DIAGNOSIS — E11.22 TYPE 2 DIABETES MELLITUS WITH DIABETIC CHRONIC KIDNEY DISEASE: ICD-10-CM

## 2018-02-26 DIAGNOSIS — Z71.89 OTHER SPECIFIED COUNSELING: ICD-10-CM

## 2018-02-26 DIAGNOSIS — I48.91 UNSPECIFIED ATRIAL FIBRILLATION: ICD-10-CM

## 2018-02-26 DIAGNOSIS — E03.9 HYPOTHYROIDISM, UNSPECIFIED: ICD-10-CM

## 2018-02-26 DIAGNOSIS — Z29.9 ENCOUNTER FOR PROPHYLACTIC MEASURES, UNSPECIFIED: ICD-10-CM

## 2018-02-26 DIAGNOSIS — J96.21 ACUTE AND CHRONIC RESPIRATORY FAILURE WITH HYPOXIA: ICD-10-CM

## 2018-02-26 LAB
APTT BLD: 52.3 SEC — HIGH (ref 27.5–37.4)
BUN SERPL-MCNC: 52 MG/DL — HIGH (ref 7–23)
CALCIUM SERPL-MCNC: 8.5 MG/DL — SIGNIFICANT CHANGE UP (ref 8.4–10.5)
CHLORIDE SERPL-SCNC: 88 MMOL/L — LOW (ref 98–107)
CO2 SERPL-SCNC: 22 MMOL/L — SIGNIFICANT CHANGE UP (ref 22–31)
CREAT SERPL-MCNC: 7.29 MG/DL — HIGH (ref 0.5–1.3)
GLUCOSE SERPL-MCNC: 324 MG/DL — HIGH (ref 70–99)
HCT VFR BLD CALC: 31.8 % — LOW (ref 39–50)
HGB BLD-MCNC: 10 G/DL — LOW (ref 13–17)
INR BLD: 3.17 — HIGH (ref 0.88–1.17)
MCHC RBC-ENTMCNC: 29.9 PG — SIGNIFICANT CHANGE UP (ref 27–34)
MCHC RBC-ENTMCNC: 31.4 % — LOW (ref 32–36)
MCV RBC AUTO: 94.9 FL — SIGNIFICANT CHANGE UP (ref 80–100)
NRBC # FLD: 0 — SIGNIFICANT CHANGE UP
PLATELET # BLD AUTO: 119 K/UL — LOW (ref 150–400)
PMV BLD: 13.6 FL — HIGH (ref 7–13)
POTASSIUM SERPL-MCNC: 4.3 MMOL/L — SIGNIFICANT CHANGE UP (ref 3.5–5.3)
POTASSIUM SERPL-SCNC: 4.3 MMOL/L — SIGNIFICANT CHANGE UP (ref 3.5–5.3)
PROTHROM AB SERPL-ACNC: 36 SEC — HIGH (ref 9.8–13.1)
RBC # BLD: 3.35 M/UL — LOW (ref 4.2–5.8)
RBC # FLD: 16.3 % — HIGH (ref 10.3–14.5)
SODIUM SERPL-SCNC: 128 MMOL/L — LOW (ref 135–145)
WBC # BLD: 7.22 K/UL — SIGNIFICANT CHANGE UP (ref 3.8–10.5)
WBC # FLD AUTO: 7.22 K/UL — SIGNIFICANT CHANGE UP (ref 3.8–10.5)

## 2018-02-26 PROCEDURE — 71045 X-RAY EXAM CHEST 1 VIEW: CPT | Mod: 26

## 2018-02-26 PROCEDURE — 99233 SBSQ HOSP IP/OBS HIGH 50: CPT

## 2018-02-26 RX ORDER — INSULIN GLARGINE 100 [IU]/ML
12 INJECTION, SOLUTION SUBCUTANEOUS AT BEDTIME
Qty: 0 | Refills: 0 | Status: DISCONTINUED | OUTPATIENT
Start: 2018-02-26 | End: 2018-02-28

## 2018-02-26 RX ADMIN — Medication 3 MILLILITER(S): at 21:53

## 2018-02-26 RX ADMIN — AMIODARONE HYDROCHLORIDE 100 MILLIGRAM(S): 400 TABLET ORAL at 05:45

## 2018-02-26 RX ADMIN — Medication 100 MILLIGRAM(S): at 05:46

## 2018-02-26 RX ADMIN — Medication 3 MILLILITER(S): at 03:50

## 2018-02-26 RX ADMIN — DIPHENHYDRAMINE HYDROCHLORIDE AND LIDOCAINE HYDROCHLORIDE AND ALUMINUM HYDROXIDE AND MAGNESIUM HYDRO 30 MILLILITER(S): KIT at 05:47

## 2018-02-26 RX ADMIN — SENNA PLUS 2 TABLET(S): 8.6 TABLET ORAL at 22:18

## 2018-02-26 RX ADMIN — MIDODRINE HYDROCHLORIDE 30 MILLIGRAM(S): 2.5 TABLET ORAL at 05:46

## 2018-02-26 RX ADMIN — Medication 16.94 MICROGRAM(S)/KG/MIN: at 05:45

## 2018-02-26 RX ADMIN — FINASTERIDE 5 MILLIGRAM(S): 5 TABLET, FILM COATED ORAL at 12:09

## 2018-02-26 RX ADMIN — Medication 3 MILLILITER(S): at 09:46

## 2018-02-26 RX ADMIN — INSULIN GLARGINE 12 UNIT(S): 100 INJECTION, SOLUTION SUBCUTANEOUS at 22:18

## 2018-02-26 RX ADMIN — Medication 2 PUFF(S): at 09:20

## 2018-02-26 RX ADMIN — Medication 16.94 MICROGRAM(S)/KG/MIN: at 19:53

## 2018-02-26 RX ADMIN — Medication 2 PUFF(S): at 22:19

## 2018-02-26 RX ADMIN — DIPHENHYDRAMINE HYDROCHLORIDE AND LIDOCAINE HYDROCHLORIDE AND ALUMINUM HYDROXIDE AND MAGNESIUM HYDRO 30 MILLILITER(S): KIT at 13:22

## 2018-02-26 RX ADMIN — ATORVASTATIN CALCIUM 80 MILLIGRAM(S): 80 TABLET, FILM COATED ORAL at 22:18

## 2018-02-26 RX ADMIN — Medication 75 MICROGRAM(S): at 05:46

## 2018-02-26 RX ADMIN — MIDODRINE HYDROCHLORIDE 30 MILLIGRAM(S): 2.5 TABLET ORAL at 22:18

## 2018-02-26 RX ADMIN — Medication 5: at 09:19

## 2018-02-26 RX ADMIN — Medication 4: at 13:21

## 2018-02-26 RX ADMIN — MIDODRINE HYDROCHLORIDE 30 MILLIGRAM(S): 2.5 TABLET ORAL at 13:22

## 2018-02-26 RX ADMIN — Medication 81 MILLIGRAM(S): at 12:09

## 2018-02-26 RX ADMIN — Medication 16.94 MICROGRAM(S)/KG/MIN: at 03:25

## 2018-02-26 RX ADMIN — Medication 1 TABLET(S): at 12:09

## 2018-02-26 NOTE — PROGRESS NOTE ADULT - SUBJECTIVE AND OBJECTIVE BOX
Patient is a 62y old  Male who presents with a chief complaint of SOB x few hours (20 Feb 2018 18:05)      SUBJECTIVE / OVERNIGHT EVENTS:    No events overnight. Patient interviewed and examined in dialysis. He states he has some "chest tightness" but other than that feels "fine". Reports no f/c/n/v/d/sob.    MEDICATIONS  (STANDING):  ALBUTerol/ipratropium for Nebulization 3 milliLiter(s) Nebulizer every 6 hours  amiodarone    Tablet 100 milliGRAM(s) Oral daily  aspirin enteric coated 81 milliGRAM(s) Oral daily  atorvastatin 80 milliGRAM(s) Oral at bedtime  buDESOnide   90 MICROgram(s) Inhaler 2 Puff(s) Inhalation two times a day  chlorhexidine 4% Liquid 1 Application(s) Topical daily  dextrose 5%. 1000 milliLiter(s) (50 mL/Hr) IV Continuous <Continuous>  dextrose 5%. 1000 milliLiter(s) (50 mL/Hr) IV Continuous <Continuous>  dextrose 50% Injectable 12.5 Gram(s) IV Push once  dextrose 50% Injectable 25 Gram(s) IV Push once  dextrose 50% Injectable 25 Gram(s) IV Push once  DOBUTamine Infusion 7.5 MICROgram(s)/kG/Min (16.942 mL/Hr) IV Continuous <Continuous>  docusate sodium 100 milliGRAM(s) Oral two times a day  finasteride 5 milliGRAM(s) Oral daily  influenza   Vaccine 0.5 milliLiter(s) IntraMuscular once  insulin glargine Injectable (LANTUS) 12 Unit(s) SubCutaneous at bedtime  insulin lispro (HumaLOG) corrective regimen sliding scale   SubCutaneous three times a day before meals  insulin lispro (HumaLOG) corrective regimen sliding scale   SubCutaneous at bedtime  levothyroxine 75 MICROGram(s) Oral daily  midodrine 30 milliGRAM(s) Oral three times a day  MIRACLE MOUTHWASH 30 milliLiter(s) Swish and Spit three times a day  Nephro-lynda 1 Tablet(s) Oral daily  polyethylene glycol 3350 17 Gram(s) Oral daily  predniSONE   Tablet 20 milliGRAM(s) Oral daily  senna 2 Tablet(s) Oral at bedtime    MEDICATIONS  (PRN):  benzocaine 15 mG/menthol 3.6 mG Lozenge 1 Lozenge Oral every 6 hours PRN Sore Throat  dextrose Gel 1 Dose(s) Oral once PRN Blood Glucose LESS THAN 70 milliGRAM(s)/deciLiter  dextrose Gel 1 Dose(s) Oral once PRN Blood Glucose LESS THAN 70 milliGRAM(s)/deciliter  glucagon  Injectable 1 milliGRAM(s) IntraMuscular once PRN Glucose <70 milliGRAM(s)/deciLiter  guaiFENesin    Syrup 100 milliGRAM(s) Oral every 6 hours PRN Cough      PHYSICAL EXAM:  T(C): 36.1 (02-26-18 @ 15:35), Max: 36.8 (02-25-18 @ 20:30)  HR: 82 (02-26-18 @ 15:35) (72 - 99)  BP: 122/54 (02-26-18 @ 15:35) (103/64 - 127/70)  RR: 23 (02-26-18 @ 15:35) (18 - 23)  SpO2: 97% (02-26-18 @ 15:35) (91% - 98%)  I&O's Summary    GENERAL: NAD, well-developed  HEAD:  Atraumatic, Normocephalic, Moist oral mucosa  EYES: EOMI, PERRLA, conjunctiva and sclera clear  NECK: Supple, No elevated JVD; +R IJ dialysis catheter   CHEST/LUNG: reduced breath sounds throughout; using ventimask; speaking in complete sentences  HEART: Regular rate and rhythm; No murmurs, rubs, or gallops  ABDOMEN: Soft, Nontender, Nondistended; Bowel sounds present  EXTREMITIES:  2+ Peripheral Pulses, No clubbing, cyanosis; 1+ pitting edema to shins bilaterally  PSYCH: AAOx3  NEUROLOGY: CN II-XII grossly intact, moving all extremities  SKIN: No rashes or lesions    LABS:  CAPILLARY BLOOD GLUCOSE      POCT Blood Glucose.: 233 mg/dL (26 Feb 2018 16:39)  POCT Blood Glucose.: 342 mg/dL (26 Feb 2018 13:11)  POCT Blood Glucose.: 368 mg/dL (26 Feb 2018 09:04)                          10.0   7.22  )-----------( 119      ( 26 Feb 2018 06:05 )             31.8     02-26    128<L>  |  88<L>  |  52<H>  ----------------------------<  324<H>  4.3   |  22  |  7.29<H>    Ca    8.5      26 Feb 2018 06:08  Phos  4.1     02-25  Mg     2.0     02-25      PT/INR - ( 26 Feb 2018 06:08 )   PT: 36.0 SEC;   INR: 3.17          PTT - ( 26 Feb 2018 06:08 )  PTT:52.3 SEC          RADIOLOGY & ADDITIONAL TESTS:    Imaging Personally Reviewed:    Consultant(s) Notes Reviewed:  Nephrology, Pulmonology, Cardiology    Care Discussed with Consultants/Other Providers:

## 2018-02-26 NOTE — PHYSICAL THERAPY INITIAL EVALUATION ADULT - GAIT DISTANCE, PT EVAL
10 feet/5 feet/pt. deferred further ambulation at this time secondary to fatigue/decreased endurance

## 2018-02-26 NOTE — PROGRESS NOTE ADULT - PROBLEM SELECTOR PLAN 1
Maintenance HD schedule, w/2k bath, inc UF goal 1.5-2L as tolearted  K acceptable. Hyponatremia 2/2 hypervolemia.  chronic volume overload 2/2 severe CMP  Continue midodrine.   c/w renal diet, add fluid restriction 1.2L/day, reinforced w/pt  s/p extra UF 2/24  declined BiPAP now  will consider extra PUF tomorrow

## 2018-02-26 NOTE — PHYSICAL THERAPY INITIAL EVALUATION ADULT - ACTIVE RANGE OF MOTION EXAMINATION, REHAB EVAL
Bilateral UE's WFL's except shoulder flexion no greater than 0-90 degrees secondary h/o AICD placement/deficits as listed below

## 2018-02-26 NOTE — PROGRESS NOTE ADULT - SUBJECTIVE AND OBJECTIVE BOX
Follow-up Pulm Progress Note  Pulmonary for Mehrcoral    No new respiratory events overnight.  Denies SOB/CP. wore bipap over night, now on 50% vm  for HD today as per pt    Medications:  MEDICATIONS  (STANDING):  ALBUTerol/ipratropium for Nebulization 3 milliLiter(s) Nebulizer every 6 hours  amiodarone    Tablet 100 milliGRAM(s) Oral daily  aspirin enteric coated 81 milliGRAM(s) Oral daily  atorvastatin 80 milliGRAM(s) Oral at bedtime  buDESOnide   90 MICROgram(s) Inhaler 2 Puff(s) Inhalation two times a day  chlorhexidine 4% Liquid 1 Application(s) Topical daily  dextrose 5%. 1000 milliLiter(s) (50 mL/Hr) IV Continuous <Continuous>  dextrose 5%. 1000 milliLiter(s) (50 mL/Hr) IV Continuous <Continuous>  dextrose 50% Injectable 12.5 Gram(s) IV Push once  dextrose 50% Injectable 25 Gram(s) IV Push once  dextrose 50% Injectable 25 Gram(s) IV Push once  DOBUTamine Infusion 7.5 MICROgram(s)/kG/Min (16.942 mL/Hr) IV Continuous <Continuous>  docusate sodium 100 milliGRAM(s) Oral two times a day  finasteride 5 milliGRAM(s) Oral daily  influenza   Vaccine 0.5 milliLiter(s) IntraMuscular once  insulin lispro (HumaLOG) corrective regimen sliding scale   SubCutaneous three times a day before meals  insulin lispro (HumaLOG) corrective regimen sliding scale   SubCutaneous at bedtime  levothyroxine 75 MICROGram(s) Oral daily  midodrine 30 milliGRAM(s) Oral three times a day  MIRACLE MOUTHWASH 30 milliLiter(s) Swish and Spit three times a day  Nephro-lynda 1 Tablet(s) Oral daily  polyethylene glycol 3350 17 Gram(s) Oral daily  senna 2 Tablet(s) Oral at bedtime    MEDICATIONS  (PRN):  benzocaine 15 mG/menthol 3.6 mG Lozenge 1 Lozenge Oral every 6 hours PRN Sore Throat  dextrose Gel 1 Dose(s) Oral once PRN Blood Glucose LESS THAN 70 milliGRAM(s)/deciLiter  dextrose Gel 1 Dose(s) Oral once PRN Blood Glucose LESS THAN 70 milliGRAM(s)/deciliter  glucagon  Injectable 1 milliGRAM(s) IntraMuscular once PRN Glucose <70 milliGRAM(s)/deciLiter  guaiFENesin    Syrup 100 milliGRAM(s) Oral every 6 hours PRN Cough          Vital Signs Last 24 Hrs  T(C): 36.6 (26 Feb 2018 08:30), Max: 36.8 (25 Feb 2018 20:30)  T(F): 97.8 (26 Feb 2018 08:30), Max: 98.3 (25 Feb 2018 20:30)  HR: 73 (26 Feb 2018 09:47) (72 - 99)  BP: 107/67 (26 Feb 2018 08:30) (103/64 - 123/76)  BP(mean): --  RR: 20 (26 Feb 2018 08:30) (18 - 20)  SpO2: 94% (26 Feb 2018 09:47) (93% - 98%)              LABS:                        10.0   7.22  )-----------( 119      ( 26 Feb 2018 06:05 )             31.8     02-26    128<L>  |  88<L>  |  52<H>  ----------------------------<  324<H>  4.3   |  22  |  7.29<H>    Ca    8.5      26 Feb 2018 06:08  Phos  4.1     02-25  Mg     2.0     02-25            CAPILLARY BLOOD GLUCOSE      POCT Blood Glucose.: 368 mg/dL (26 Feb 2018 09:04)    PT/INR - ( 26 Feb 2018 06:08 )   PT: 36.0 SEC;   INR: 3.17          PTT - ( 26 Feb 2018 06:08 )  PTT:52.3 SEC                    CULTURES:           Physical Examination:  PULM: decreased bs bilaterally, no significant sputum production  CVS: S1, S2 heard    RADIOLOGY REVIEWED  CXR: < from: Xray Chest 1 View- PORTABLE-Urgent (02.17.18 @ 18:38) >  IMPRESSION:  Redemonstrated left PICC with tip in SVC region. No kinking or   discontinuity along visualized course.    Remainder of the exam is unchanged.    < end of copied text >      CT chest:< from: CT Chest No Cont (06.05.17 @ 15:45) >  MPRESSION:    Extensive pulmonary fibrosis.    Evidence of superimposed mild small airway disease.    < end of copied text >      TTE:  < from: Transthoracic Echocardiogram (10.18.17 @ 09:48) >  ----------------------------------------------------------------  CONCLUSIONS:  1. Normal trileaflet aortic valve.  2. Severely dilated left atrium.  LA volume index = 56  cc/m2.  3. Moderate left ventricular enlargement.  4. Severe global left ventricular systolic dysfunction.  Flattening of the interventricular septum in both systole  and diastole is  consistent with right ventricular pressure  overload.  5. Right ventricular enlargement with decreased right  ventricular systolic function. A device wire is noted in  the right heart.  6. Normal tricuspid valve.  Moderate tricuspid  regurgitation.  7. Normal pulmonic valve. Mild pulmonic regurgitation.  Estimated pulmonary artery systolic pressure equals 55 mm  Hg, assuming right atrial pressure equals 10  mm Hg,  consistent with moderate pulmonary hypertension.  8. Agitated saline injection resulted in a large amount of  bubbles seen in the right heart. Although the exact amount  of cardiac cycles that occurred prior to the crossing of  the bubbles is unclear, the findings are suggestive of a  significant shunt. Consider MARGIE for further workup, if  clinically warrnated.    < end of copied text > Follow-up Pulm Progress Note  Pulmonary for Mehrcoral    No new respiratory events overnight.  Denies SOB/CP. wore bipap over night, now on 50% vm  for HD today as per pt    Medications:  MEDICATIONS  (STANDING):  ALBUTerol/ipratropium for Nebulization 3 milliLiter(s) Nebulizer every 6 hours  amiodarone    Tablet 100 milliGRAM(s) Oral daily  aspirin enteric coated 81 milliGRAM(s) Oral daily  atorvastatin 80 milliGRAM(s) Oral at bedtime  buDESOnide   90 MICROgram(s) Inhaler 2 Puff(s) Inhalation two times a day  chlorhexidine 4% Liquid 1 Application(s) Topical daily  dextrose 5%. 1000 milliLiter(s) (50 mL/Hr) IV Continuous <Continuous>  dextrose 5%. 1000 milliLiter(s) (50 mL/Hr) IV Continuous <Continuous>  dextrose 50% Injectable 12.5 Gram(s) IV Push once  dextrose 50% Injectable 25 Gram(s) IV Push once  dextrose 50% Injectable 25 Gram(s) IV Push once  DOBUTamine Infusion 7.5 MICROgram(s)/kG/Min (16.942 mL/Hr) IV Continuous <Continuous>  docusate sodium 100 milliGRAM(s) Oral two times a day  finasteride 5 milliGRAM(s) Oral daily  influenza   Vaccine 0.5 milliLiter(s) IntraMuscular once  insulin lispro (HumaLOG) corrective regimen sliding scale   SubCutaneous three times a day before meals  insulin lispro (HumaLOG) corrective regimen sliding scale   SubCutaneous at bedtime  levothyroxine 75 MICROGram(s) Oral daily  midodrine 30 milliGRAM(s) Oral three times a day  MIRACLE MOUTHWASH 30 milliLiter(s) Swish and Spit three times a day  Nephro-lynda 1 Tablet(s) Oral daily  polyethylene glycol 3350 17 Gram(s) Oral daily  senna 2 Tablet(s) Oral at bedtime    MEDICATIONS  (PRN):  benzocaine 15 mG/menthol 3.6 mG Lozenge 1 Lozenge Oral every 6 hours PRN Sore Throat  dextrose Gel 1 Dose(s) Oral once PRN Blood Glucose LESS THAN 70 milliGRAM(s)/deciLiter  dextrose Gel 1 Dose(s) Oral once PRN Blood Glucose LESS THAN 70 milliGRAM(s)/deciliter  glucagon  Injectable 1 milliGRAM(s) IntraMuscular once PRN Glucose <70 milliGRAM(s)/deciLiter  guaiFENesin    Syrup 100 milliGRAM(s) Oral every 6 hours PRN Cough    Vital Signs Last 24 Hrs  T(C): 36.6 (26 Feb 2018 08:30), Max: 36.8 (25 Feb 2018 20:30)  T(F): 97.8 (26 Feb 2018 08:30), Max: 98.3 (25 Feb 2018 20:30)  HR: 73 (26 Feb 2018 09:47) (72 - 99)  BP: 107/67 (26 Feb 2018 08:30) (103/64 - 123/76)  BP(mean): --  RR: 20 (26 Feb 2018 08:30) (18 - 20)  SpO2: 94% (26 Feb 2018 09:47) (93% - 98%)    LABS:                        10.0   7.22  )-----------( 119      ( 26 Feb 2018 06:05 )             31.8     02-26    128<L>  |  88<L>  |  52<H>  ----------------------------<  324<H>  4.3   |  22  |  7.29<H>    Ca    8.5      26 Feb 2018 06:08  Phos  4.1     02-25  Mg     2.0     02-25    CAPILLARY BLOOD GLUCOSE      POCT Blood Glucose.: 368 mg/dL (26 Feb 2018 09:04)    PT/INR - ( 26 Feb 2018 06:08 )   PT: 36.0 SEC;   INR: 3.17          PTT - ( 26 Feb 2018 06:08 )  PTT:52.3 SEC    CULTURES:     Physical Examination:  PULM: decreased bs bilaterally, no significant sputum production  CVS: S1, S2 heard    RADIOLOGY REVIEWED  CXR: < from: Xray Chest 1 View- PORTABLE-Urgent (02.17.18 @ 18:38) >  IMPRESSION:  Redemonstrated left PICC with tip in SVC region. No kinking or   discontinuity along visualized course.    Remainder of the exam is unchanged.    < end of copied text >      CT chest:< from: CT Chest No Cont (06.05.17 @ 15:45) >  MPRESSION:    Extensive pulmonary fibrosis.    Evidence of superimposed mild small airway disease.    < end of copied text >      TTE:  < from: Transthoracic Echocardiogram (10.18.17 @ 09:48) >  ----------------------------------------------------------------  CONCLUSIONS:  1. Normal trileaflet aortic valve.  2. Severely dilated left atrium.  LA volume index = 56  cc/m2.  3. Moderate left ventricular enlargement.  4. Severe global left ventricular systolic dysfunction.  Flattening of the interventricular septum in both systole  and diastole is  consistent with right ventricular pressure  overload.  5. Right ventricular enlargement with decreased right  ventricular systolic function. A device wire is noted in  the right heart.  6. Normal tricuspid valve.  Moderate tricuspid  regurgitation.  7. Normal pulmonic valve. Mild pulmonic regurgitation.  Estimated pulmonary artery systolic pressure equals 55 mm  Hg, assuming right atrial pressure equals 10  mm Hg,  consistent with moderate pulmonary hypertension.  8. Agitated saline injection resulted in a large amount of  bubbles seen in the right heart. Although the exact amount  of cardiac cycles that occurred prior to the crossing of  the bubbles is unclear, the findings are suggestive of a  significant shunt. Consider MARGIE for further workup, if  clinically warrnated.    < end of copied text >

## 2018-02-26 NOTE — PROGRESS NOTE ADULT - PROBLEM SELECTOR PLAN 6
FS glucose elevated, likely because of prednisone.     ESRD on HD. Electrolytes acceptable. Chronic volume overload. Renal recs reviewed.    -start Lantus 12U hs  -c/w Lispro SS  -HD/UF per renal

## 2018-02-26 NOTE — PROGRESS NOTE ADULT - PROBLEM SELECTOR PROBLEM 10
Need for prophylactic measure
Prophylactic measure

## 2018-02-26 NOTE — PHYSICAL THERAPY INITIAL EVALUATION ADULT - LIVES WITH, PROFILE
spouse/(son), private home, 3 steps-to-enter/exit home, (+) [id number] in the home (both with unilateral handrail)/children

## 2018-02-26 NOTE — PHYSICAL THERAPY INITIAL EVALUATION ADULT - GENERAL OBSERVATIONS, REHAB EVAL
Pt. received in bed in NAD, receiving a respiratory treatment.  Pt. offers no new c/o at rest.  Son at bedside.

## 2018-02-26 NOTE — PROGRESS NOTE ADULT - PROBLEM SELECTOR PLAN 9
In light of comorbidities, chronic dobutamine and midodrine, patient has an extremely poor prognosis. Will need to discuss advance directives with patient to better direct care.

## 2018-02-26 NOTE — PROGRESS NOTE ADULT - PROBLEM SELECTOR PLAN 1
Patient initially admitted with acute on chronic hypoxic respiratory failure secondary to influenza and underlying COPD, pulmonary fibrosis. s/p course of Tamiflu and CCU stay for acute on chronic hypotension.     Present is comfortable on ventimask. Pulmonology following; recommendations reviewed.  -attempt to wean to O2 via NC  -supplemental oxygen to maintain O2 sat >90%  -BiPAP hs  -start prednisone 20mg daily

## 2018-02-26 NOTE — PROGRESS NOTE ADULT - PROBLEM SELECTOR PLAN 7
HR at goal. Not a candidate for beta blockers given chronic hypotension.    On warfarin. INR is supratherapeutic today. Hold warfarin and repeat INR in AM.    c/w amiodarone

## 2018-02-26 NOTE — PHYSICAL THERAPY INITIAL EVALUATION ADULT - IMPAIRMENTS FOUND, PT EVAL
muscle strength/aerobic capacity/endurance/gait, locomotion, and balance/ventilation and respiration/gas exchange

## 2018-02-26 NOTE — PROGRESS NOTE ADULT - ASSESSMENT
62 M PMH ESRD on HD, NICM with severe systolic dysfunction (EF 21%), on chronic dobutamine gtt, also with AICD, chronic hypotension on midodrine, atrial fibrillation on warfarin, COPD, pulmonary fibrosis on home O2, initially presented with acute on chronic respiratory failure in setting of influenza B infection, also with exacerbation of chronic hypoxic respiratory failure. He was managed in the CCU on norepinephrine. He is now on the general medicine garcia and remains on dobutamine.

## 2018-02-26 NOTE — PROGRESS NOTE ADULT - PROBLEM SELECTOR PLAN 4
Reduced breath sounds throughout. Pulmonology following. Will add prednisone 20mg daily. c/w Pulmicort

## 2018-02-26 NOTE — PROGRESS NOTE ADULT - PROBLEM SELECTOR PLAN 2
s/p AICD and on chronic dobutamine. Extremities are warm. Patient chronically hypervolemic.    -c/w dobutamine infusion  -HD/UF per renal

## 2018-02-26 NOTE — PROGRESS NOTE ADULT - PROBLEM SELECTOR PLAN 10
IMPROVE VTE Individual Risk Assessment        RISK                                                          Points  [  ] Previous VTE                                                 3  [  ] Thrombophilia                                              2  [  ] Lower limb paralysis                                    2        (unable to hold up >15 seconds)    [  ] Current Cancer                                             2         (within 6 months)  [x  ] Immobilization > 24 hrs                              1  [x  ] ICU/CCU stay > 24 hours                            1  [x  ] Age > 60                                                        1  IMPROVE VTE Score _________3; on warfarin

## 2018-02-26 NOTE — PHYSICAL THERAPY INITIAL EVALUATION ADULT - NAME OF CLINICIAN
Nichole AGUILERA RN; attempted to speak with CARLYN, but she was off the floor at this time Nichole AGUILERA RN

## 2018-02-26 NOTE — PROGRESS NOTE ADULT - SUBJECTIVE AND OBJECTIVE BOX
Patient seen and examined bedside    complains of inc sob  on BiPAP last night and today AM  denies any chest pain    No Known Allergies    Hospital Medications:   MEDICATIONS  (STANDING):  ALBUTerol/ipratropium for Nebulization 3 milliLiter(s) Nebulizer every 6 hours  amiodarone    Tablet 100 milliGRAM(s) Oral daily  aspirin enteric coated 81 milliGRAM(s) Oral daily  atorvastatin 80 milliGRAM(s) Oral at bedtime  buDESOnide   90 MICROgram(s) Inhaler 2 Puff(s) Inhalation two times a day  chlorhexidine 4% Liquid 1 Application(s) Topical daily  dextrose 5%. 1000 milliLiter(s) (50 mL/Hr) IV Continuous <Continuous>  dextrose 5%. 1000 milliLiter(s) (50 mL/Hr) IV Continuous <Continuous>  dextrose 50% Injectable 12.5 Gram(s) IV Push once  dextrose 50% Injectable 25 Gram(s) IV Push once  dextrose 50% Injectable 25 Gram(s) IV Push once  DOBUTamine Infusion 7.5 MICROgram(s)/kG/Min (16.942 mL/Hr) IV Continuous <Continuous>  docusate sodium 100 milliGRAM(s) Oral two times a day  finasteride 5 milliGRAM(s) Oral daily  influenza   Vaccine 0.5 milliLiter(s) IntraMuscular once  insulin lispro (HumaLOG) corrective regimen sliding scale   SubCutaneous three times a day before meals  insulin lispro (HumaLOG) corrective regimen sliding scale   SubCutaneous at bedtime  levothyroxine 75 MICROGram(s) Oral daily  midodrine 30 milliGRAM(s) Oral three times a day  MIRACLE MOUTHWASH 30 milliLiter(s) Swish and Spit three times a day  Nephro-lynda 1 Tablet(s) Oral daily  polyethylene glycol 3350 17 Gram(s) Oral daily  senna 2 Tablet(s) Oral at bedtime  warfarin 3 milliGRAM(s) Oral once      VITALS:  Vital Signs Last 24 Hrs  T(C): 36.7 (26 Feb 2018 12:20), Max: 36.8 (25 Feb 2018 20:30)  T(F): 98 (26 Feb 2018 12:20), Max: 98.3 (25 Feb 2018 20:30)  HR: 79 (26 Feb 2018 12:58) (72 - 99)  BP: 115/76 (26 Feb 2018 12:20) (103/64 - 115/76)  BP(mean): --  RR: 18 (26 Feb 2018 12:20) (18 - 20)  SpO2: 94% (26 Feb 2018 12:58) (91% - 98%)      PHYSICAL EXAM:  Constitutional: NAD  HEENT: anicteric sclera, oropharynx clear, MMM  Neck: + JVD  Respiratory: Bibasilar crackles. No wheeze   Cardiovascular: S1, S2, RRR  Gastrointestinal: BS+, soft, NT/ND  Extremities: No cyanosis or clubbing. 1+ peripheral LE edema  Neurological: A/O x 3, no focal deficits  Psychiatric: Normal mood, normal affect  : No CVA tenderness. No rosa.   Skin: No rashes  Vascular Access: RIJ Tunneled cath.     LABS:  02-26    128<L>  |  88<L>  |  52<H>  ----------------------------<  324<H>  4.3   |  22  |  7.29<H>    Ca    8.5      26 Feb 2018 06:08  Phos  4.1     02-25  Mg     2.0     02-25                          10.0   7.22  )-----------( 119      ( 26 Feb 2018 06:05 )             31.8     Urine Studies:      RADIOLOGY & ADDITIONAL STUDIES:

## 2018-02-26 NOTE — PHYSICAL THERAPY INITIAL EVALUATION ADULT - PERTINENT HX OF CURRENT PROBLEM, REHAB EVAL
Pt. is a 63 y/o male admitted to [facility] on 02/13/18 with a dx of SOB x few hours.  PT consult request secondary to weakness.  H/O ESRD on HD.  Recent admission to [facility].  CXR = Right pleural effusion developing.

## 2018-02-26 NOTE — PHYSICAL THERAPY INITIAL EVALUATION ADULT - ADDITIONAL COMMENTS
Pt. uses home O2 24/7.    Pt. left seated at edge of bed post-PT in NAD with all lines/tubes intact & table/TV/phone/call bell within reach.  Son at bedside.  MARGY hanks.

## 2018-02-26 NOTE — PROGRESS NOTE ADULT - PROBLEM SELECTOR PLAN 1
has underlying pulmonary fibrosis and respiratory failure precipitated by influenza:   Titrate fio2 to n/c as tolerated-remains on 50% VM  NIV QHS/PRV  check cxr today

## 2018-02-26 NOTE — PHYSICAL THERAPY INITIAL EVALUATION ADULT - CRITERIA FOR SKILLED THERAPEUTIC INTERVENTIONS
therapy frequency/anticipated discharge recommendation/predicted duration of therapy intervention/Home with home PT/impairments found

## 2018-02-26 NOTE — PROGRESS NOTE ADULT - SUBJECTIVE AND OBJECTIVE BOX
SUBJECTIVE: Patient resting comfortably on dobutamine drip and ventimask.  Denies chest pain.  Breathing much better on 50% ventimask.  	  MEDICATIONS:  MEDICATIONS  (STANDING):  ALBUTerol/ipratropium for Nebulization 3 milliLiter(s) Nebulizer every 6 hours  amiodarone    Tablet 100 milliGRAM(s) Oral daily  aspirin enteric coated 81 milliGRAM(s) Oral daily  atorvastatin 80 milliGRAM(s) Oral at bedtime  buDESOnide   90 MICROgram(s) Inhaler 2 Puff(s) Inhalation two times a day  chlorhexidine 4% Liquid 1 Application(s) Topical daily  dextrose 5%. 1000 milliLiter(s) (50 mL/Hr) IV Continuous <Continuous>  dextrose 5%. 1000 milliLiter(s) (50 mL/Hr) IV Continuous <Continuous>  dextrose 50% Injectable 12.5 Gram(s) IV Push once  dextrose 50% Injectable 25 Gram(s) IV Push once  dextrose 50% Injectable 25 Gram(s) IV Push once  DOBUTamine Infusion 7.5 MICROgram(s)/kG/Min (16.942 mL/Hr) IV Continuous <Continuous>  docusate sodium 100 milliGRAM(s) Oral two times a day  finasteride 5 milliGRAM(s) Oral daily  influenza   Vaccine 0.5 milliLiter(s) IntraMuscular once  insulin lispro (HumaLOG) corrective regimen sliding scale   SubCutaneous three times a day before meals  insulin lispro (HumaLOG) corrective regimen sliding scale   SubCutaneous at bedtime  levothyroxine 75 MICROGram(s) Oral daily  midodrine 30 milliGRAM(s) Oral three times a day  MIRACLE MOUTHWASH 30 milliLiter(s) Swish and Spit three times a day  Nephro-lynda 1 Tablet(s) Oral daily  polyethylene glycol 3350 17 Gram(s) Oral daily  senna 2 Tablet(s) Oral at bedtime    MEDICATIONS  (PRN):  benzocaine 15 mG/menthol 3.6 mG Lozenge 1 Lozenge Oral every 6 hours PRN Sore Throat  dextrose Gel 1 Dose(s) Oral once PRN Blood Glucose LESS THAN 70 milliGRAM(s)/deciLiter  dextrose Gel 1 Dose(s) Oral once PRN Blood Glucose LESS THAN 70 milliGRAM(s)/deciliter  glucagon  Injectable 1 milliGRAM(s) IntraMuscular once PRN Glucose <70 milliGRAM(s)/deciLiter  guaiFENesin    Syrup 100 milliGRAM(s) Oral every 6 hours PRN Cough          REVIEW OF SYSTEMS:    CONSTITUTIONAL: No fever, weight loss, or fatigue  EYES: No eye pain, visual disturbances, or discharge  NECK: No pain or stiffness  RESPIRATORY: No cough, wheezing, chills or hemoptysis; No Shortness of Breath  CARDIOVASCULAR: No chest pain, palpitations, dizziness, or leg swelling  GASTROINTESTINAL: No abdominal or epigastric pain. No nausea, vomiting, or hematemesis; No diarrhea or constipation. No melena or hematochezia.  GENITOURINARY: No dysuria, frequency, hematuria, or incontinence  NEUROLOGICAL: No headaches, memory loss, loss of strength, numbness, or tremors  SKIN: No itching, burning, rashes, or lesions   LYMPH Nodes: No enlarged glands  MUSCULOSKELETAL: No joint pain or swelling; No muscle, back, or extremity pain  All other review of systems are negative.  	  [ ] Unable to obtain    PHYSICAL EXAM:  T(F): 97.8 (02-26-18 @ 08:30), Max: 98.3 (02-25-18 @ 20:30)  HR: 99 (02-26-18 @ 08:30) (66 - 99)  BP: 107/67 (02-26-18 @ 08:30) (103/64 - 123/76)  RR: 20 (02-26-18 @ 08:30) (18 - 20)  SpO2: 93% (02-26-18 @ 08:30) (93% - 98%)  Wt(kg): --  ,   I&O's Summary      PHYSICAL EXAM    Appearance: Normal	  HEENT:   Normal oral mucosa, PERRL, EOMI	  NECK: Soft and supple, No LAD, No JVD  Cardiovascular: Regular Rate and Rhythm, Normal S1 S2, No murmurs, No clicks, gallops or rubs  Respiratory: Lungs Clear  Gastrointestinal:  Soft, Non-tender, + BS	  Skin: No rashes, No ecchymoses, No cyanosis  Neurologic: Non-focal  Extremities: trace edema  Vascular: Peripheral pulses palpable 2+ bilaterally    TELEMETRY: 	NSR    	    LABS:	 	                          10.0   7.22  )-----------( 119      ( 26 Feb 2018 06:05 )             31.8               02-26    128<L>  |  88<L>  |  52<H>  ----------------------------<  324<H>  4.3   |  22  |  7.29<H>    Ca    8.5      26 Feb 2018 06:08  Phos  4.1     02-25  Mg     2.0     02-25      PT/INR - ( 26 Feb 2018 06:08 )   PT: 36.0 SEC;   INR: 3.17          PTT - ( 26 Feb 2018 06:08 )  PTT:52.3 SEC

## 2018-02-26 NOTE — PROVIDER CONTACT NOTE (MEDICATION) - SITUATION
Patient blood glucose level 233. Patient does not want to eat at this time. Insulin ordered only for before meal. PA informed that there is no insulin order to cover for high glucose with no meals.

## 2018-02-26 NOTE — PROGRESS NOTE ADULT - ASSESSMENT
Blood pressure is improved appears to be at baseline.  Oxygenation has improved.  Patient is on home dobutamine  Hemodynamically stable.  Continue current medications.  INR is supratherapeutic.  Recommend physical therapy

## 2018-02-27 LAB
ALBUMIN SERPL ELPH-MCNC: 2.8 G/DL — LOW (ref 3.3–5)
ALP SERPL-CCNC: 216 U/L — HIGH (ref 40–120)
ALT FLD-CCNC: 61 U/L — HIGH (ref 4–41)
AST SERPL-CCNC: 73 U/L — HIGH (ref 4–40)
BILIRUB DIRECT SERPL-MCNC: 0.3 MG/DL — HIGH (ref 0.1–0.2)
BILIRUB SERPL-MCNC: 0.5 MG/DL — SIGNIFICANT CHANGE UP (ref 0.2–1.2)
BUN SERPL-MCNC: 36 MG/DL — HIGH (ref 7–23)
CALCIUM SERPL-MCNC: 8.4 MG/DL — SIGNIFICANT CHANGE UP (ref 8.4–10.5)
CHLORIDE SERPL-SCNC: 94 MMOL/L — LOW (ref 98–107)
CO2 SERPL-SCNC: 23 MMOL/L — SIGNIFICANT CHANGE UP (ref 22–31)
CREAT SERPL-MCNC: 5.08 MG/DL — HIGH (ref 0.5–1.3)
GLUCOSE SERPL-MCNC: 117 MG/DL — HIGH (ref 70–99)
HCT VFR BLD CALC: 35.1 % — LOW (ref 39–50)
HGB BLD-MCNC: 11 G/DL — LOW (ref 13–17)
INR BLD: 2.94 — HIGH (ref 0.88–1.17)
MAGNESIUM SERPL-MCNC: 1.9 MG/DL — SIGNIFICANT CHANGE UP (ref 1.6–2.6)
MCHC RBC-ENTMCNC: 29.9 PG — SIGNIFICANT CHANGE UP (ref 27–34)
MCHC RBC-ENTMCNC: 31.3 % — LOW (ref 32–36)
MCV RBC AUTO: 95.4 FL — SIGNIFICANT CHANGE UP (ref 80–100)
NRBC # FLD: 0 — SIGNIFICANT CHANGE UP
PHOSPHATE SERPL-MCNC: 2.9 MG/DL — SIGNIFICANT CHANGE UP (ref 2.5–4.5)
PLATELET # BLD AUTO: 138 K/UL — LOW (ref 150–400)
PMV BLD: 12.9 FL — SIGNIFICANT CHANGE UP (ref 7–13)
POTASSIUM SERPL-MCNC: 3.4 MMOL/L — LOW (ref 3.5–5.3)
POTASSIUM SERPL-SCNC: 3.4 MMOL/L — LOW (ref 3.5–5.3)
PROT SERPL-MCNC: 7.7 G/DL — SIGNIFICANT CHANGE UP (ref 6–8.3)
PROTHROM AB SERPL-ACNC: 34.6 SEC — HIGH (ref 9.8–13.1)
RBC # BLD: 3.68 M/UL — LOW (ref 4.2–5.8)
RBC # FLD: 16.8 % — HIGH (ref 10.3–14.5)
SODIUM SERPL-SCNC: 133 MMOL/L — LOW (ref 135–145)
WBC # BLD: 7.51 K/UL — SIGNIFICANT CHANGE UP (ref 3.8–10.5)
WBC # FLD AUTO: 7.51 K/UL — SIGNIFICANT CHANGE UP (ref 3.8–10.5)

## 2018-02-27 PROCEDURE — 99233 SBSQ HOSP IP/OBS HIGH 50: CPT

## 2018-02-27 RX ORDER — WARFARIN SODIUM 2.5 MG/1
2.5 TABLET ORAL ONCE
Qty: 0 | Refills: 0 | Status: COMPLETED | OUTPATIENT
Start: 2018-02-27 | End: 2018-02-27

## 2018-02-27 RX ADMIN — Medication 20 MILLIGRAM(S): at 05:36

## 2018-02-27 RX ADMIN — ATORVASTATIN CALCIUM 80 MILLIGRAM(S): 80 TABLET, FILM COATED ORAL at 22:22

## 2018-02-27 RX ADMIN — Medication 3 MILLILITER(S): at 03:52

## 2018-02-27 RX ADMIN — Medication 5: at 13:14

## 2018-02-27 RX ADMIN — MIDODRINE HYDROCHLORIDE 30 MILLIGRAM(S): 2.5 TABLET ORAL at 22:22

## 2018-02-27 RX ADMIN — FINASTERIDE 5 MILLIGRAM(S): 5 TABLET, FILM COATED ORAL at 13:12

## 2018-02-27 RX ADMIN — SENNA PLUS 2 TABLET(S): 8.6 TABLET ORAL at 22:22

## 2018-02-27 RX ADMIN — Medication 1: at 19:31

## 2018-02-27 RX ADMIN — Medication 1: at 09:18

## 2018-02-27 RX ADMIN — WARFARIN SODIUM 2.5 MILLIGRAM(S): 2.5 TABLET ORAL at 22:44

## 2018-02-27 RX ADMIN — INSULIN GLARGINE 12 UNIT(S): 100 INJECTION, SOLUTION SUBCUTANEOUS at 22:21

## 2018-02-27 RX ADMIN — Medication 81 MILLIGRAM(S): at 13:11

## 2018-02-27 RX ADMIN — DIPHENHYDRAMINE HYDROCHLORIDE AND LIDOCAINE HYDROCHLORIDE AND ALUMINUM HYDROXIDE AND MAGNESIUM HYDRO 30 MILLILITER(S): KIT at 13:12

## 2018-02-27 RX ADMIN — Medication 1 TABLET(S): at 13:12

## 2018-02-27 RX ADMIN — AMIODARONE HYDROCHLORIDE 100 MILLIGRAM(S): 400 TABLET ORAL at 05:35

## 2018-02-27 RX ADMIN — Medication 3 MILLILITER(S): at 08:47

## 2018-02-27 RX ADMIN — DIPHENHYDRAMINE HYDROCHLORIDE AND LIDOCAINE HYDROCHLORIDE AND ALUMINUM HYDROXIDE AND MAGNESIUM HYDRO 30 MILLILITER(S): KIT at 05:36

## 2018-02-27 RX ADMIN — Medication 2 PUFF(S): at 22:21

## 2018-02-27 RX ADMIN — MIDODRINE HYDROCHLORIDE 30 MILLIGRAM(S): 2.5 TABLET ORAL at 05:35

## 2018-02-27 RX ADMIN — Medication 2 PUFF(S): at 08:42

## 2018-02-27 RX ADMIN — Medication 3 MILLILITER(S): at 21:35

## 2018-02-27 RX ADMIN — Medication 100 MILLIGRAM(S): at 05:35

## 2018-02-27 RX ADMIN — Medication 75 MICROGRAM(S): at 05:35

## 2018-02-27 RX ADMIN — MIDODRINE HYDROCHLORIDE 30 MILLIGRAM(S): 2.5 TABLET ORAL at 13:11

## 2018-02-27 RX ADMIN — DIPHENHYDRAMINE HYDROCHLORIDE AND LIDOCAINE HYDROCHLORIDE AND ALUMINUM HYDROXIDE AND MAGNESIUM HYDRO 30 MILLILITER(S): KIT at 22:22

## 2018-02-27 RX ADMIN — Medication 3 MILLILITER(S): at 15:47

## 2018-02-27 NOTE — PROGRESS NOTE ADULT - PROBLEM SELECTOR PLAN 6
FS glucose elevated, likely because of prednisone.   -start Lantus 12U hs -c/w Lispro SS - continue HD support.

## 2018-02-27 NOTE — PROGRESS NOTE ADULT - SUBJECTIVE AND OBJECTIVE BOX
Follow-up Pulm Progress Note    Pulmonary for Mehrcoral  No new respiratory events overnight.  Denies SOB/CP. wore bipap last night, on 50% VM  s/p HD yesterday with removal of 2liters    Medications:  MEDICATIONS  (STANDING):  ALBUTerol/ipratropium for Nebulization 3 milliLiter(s) Nebulizer every 6 hours  amiodarone    Tablet 100 milliGRAM(s) Oral daily  aspirin enteric coated 81 milliGRAM(s) Oral daily  atorvastatin 80 milliGRAM(s) Oral at bedtime  buDESOnide   90 MICROgram(s) Inhaler 2 Puff(s) Inhalation two times a day  chlorhexidine 4% Liquid 1 Application(s) Topical daily  dextrose 5%. 1000 milliLiter(s) (50 mL/Hr) IV Continuous <Continuous>  dextrose 5%. 1000 milliLiter(s) (50 mL/Hr) IV Continuous <Continuous>  dextrose 50% Injectable 12.5 Gram(s) IV Push once  dextrose 50% Injectable 25 Gram(s) IV Push once  dextrose 50% Injectable 25 Gram(s) IV Push once  DOBUTamine Infusion 7.5 MICROgram(s)/kG/Min (16.942 mL/Hr) IV Continuous <Continuous>  docusate sodium 100 milliGRAM(s) Oral two times a day  finasteride 5 milliGRAM(s) Oral daily  influenza   Vaccine 0.5 milliLiter(s) IntraMuscular once  insulin glargine Injectable (LANTUS) 12 Unit(s) SubCutaneous at bedtime  insulin lispro (HumaLOG) corrective regimen sliding scale   SubCutaneous three times a day before meals  insulin lispro (HumaLOG) corrective regimen sliding scale   SubCutaneous at bedtime  levothyroxine 75 MICROGram(s) Oral daily  midodrine 30 milliGRAM(s) Oral three times a day  MIRACLE MOUTHWASH 30 milliLiter(s) Swish and Spit three times a day  Nephro-lynad 1 Tablet(s) Oral daily  polyethylene glycol 3350 17 Gram(s) Oral daily  predniSONE   Tablet 20 milliGRAM(s) Oral daily  senna 2 Tablet(s) Oral at bedtime  warfarin 2.5 milliGRAM(s) Oral once    MEDICATIONS  (PRN):  benzocaine 15 mG/menthol 3.6 mG Lozenge 1 Lozenge Oral every 6 hours PRN Sore Throat  dextrose Gel 1 Dose(s) Oral once PRN Blood Glucose LESS THAN 70 milliGRAM(s)/deciLiter  dextrose Gel 1 Dose(s) Oral once PRN Blood Glucose LESS THAN 70 milliGRAM(s)/deciliter  glucagon  Injectable 1 milliGRAM(s) IntraMuscular once PRN Glucose <70 milliGRAM(s)/deciLiter  guaiFENesin    Syrup 100 milliGRAM(s) Oral every 6 hours PRN Cough          Vital Signs Last 24 Hrs  T(C): 36.6 (27 Feb 2018 05:00), Max: 36.7 (26 Feb 2018 12:20)  T(F): 97.9 (27 Feb 2018 05:00), Max: 98 (26 Feb 2018 12:20)  HR: 85 (27 Feb 2018 08:47) (70 - 968)  BP: 111/60 (27 Feb 2018 05:00) (109/71 - 130/45)  BP(mean): --  RR: 20 (27 Feb 2018 05:00) (18 - 23)  SpO2: 95% (27 Feb 2018 08:47) (91% - 99%)          02-26 @ 07:01  -  02-27 @ 07:00  --------------------------------------------------------  IN: 500 mL / OUT: 2500 mL / NET: -2000 mL          LABS:                        11.0   7.51  )-----------( 138      ( 27 Feb 2018 06:30 )             35.1     02-27    133<L>  |  94<L>  |  36<H>  ----------------------------<  117<H>  3.4<L>   |  23  |  5.08<H>    Ca    8.4      27 Feb 2018 06:30  Phos  2.9     02-27  Mg     1.9     02-27    TPro  7.7  /  Alb  2.8<L>  /  TBili  0.5  /  DBili  0.3<H>  /  AST  73<H>  /  ALT  61<H>  /  AlkPhos  216<H>  02-27          CAPILLARY BLOOD GLUCOSE      POCT Blood Glucose.: 158 mg/dL (26 Feb 2018 21:30)    PT/INR - ( 27 Feb 2018 06:30 )   PT: 34.6 SEC;   INR: 2.94          PTT - ( 26 Feb 2018 06:08 )  PTT:52.3 SEC                    CULTURES: (if applicable)          Physical Examination:  PULM: decreased bs bilaterally, no significant sputum production  CVS: S1, S2 heard    RADIOLOGY REVIEWED  CXR: < from: Xray Chest 1 View- PORTABLE-Urgent (02.26.18 @ 11:27) >  IMPRESSION:    Persistent diffuse interstitial and reticular lung opacities bilaterally,   mildly improved since 2/17/2018, which may represent pulmonary edema   superimposed on patient's known interstitial lung disease.    Small bilateral pleural effusions.    < end of copied text >      CT chest:< from: CT Chest No Cont (06.05.17 @ 15:45) >  IMPRESSION:    Extensive pulmonary fibrosis.    Evidence of superimposed mild small airway disease.    < end of copied text >      TTE: Follow-up Pulm Progress Note    Pulmonary for Mehrcoral  No new respiratory events overnight.  Denies SOB/CP. wore bipap last night, on 50% VM  s/p HD yesterday with removal of 2liters    Medications:  MEDICATIONS  (STANDING):  ALBUTerol/ipratropium for Nebulization 3 milliLiter(s) Nebulizer every 6 hours  amiodarone    Tablet 100 milliGRAM(s) Oral daily  aspirin enteric coated 81 milliGRAM(s) Oral daily  atorvastatin 80 milliGRAM(s) Oral at bedtime  buDESOnide   90 MICROgram(s) Inhaler 2 Puff(s) Inhalation two times a day  chlorhexidine 4% Liquid 1 Application(s) Topical daily  dextrose 5%. 1000 milliLiter(s) (50 mL/Hr) IV Continuous <Continuous>  dextrose 5%. 1000 milliLiter(s) (50 mL/Hr) IV Continuous <Continuous>  dextrose 50% Injectable 12.5 Gram(s) IV Push once  dextrose 50% Injectable 25 Gram(s) IV Push once  dextrose 50% Injectable 25 Gram(s) IV Push once  DOBUTamine Infusion 7.5 MICROgram(s)/kG/Min (16.942 mL/Hr) IV Continuous <Continuous>  docusate sodium 100 milliGRAM(s) Oral two times a day  finasteride 5 milliGRAM(s) Oral daily  influenza   Vaccine 0.5 milliLiter(s) IntraMuscular once  insulin glargine Injectable (LANTUS) 12 Unit(s) SubCutaneous at bedtime  insulin lispro (HumaLOG) corrective regimen sliding scale   SubCutaneous three times a day before meals  insulin lispro (HumaLOG) corrective regimen sliding scale   SubCutaneous at bedtime  levothyroxine 75 MICROGram(s) Oral daily  midodrine 30 milliGRAM(s) Oral three times a day  MIRACLE MOUTHWASH 30 milliLiter(s) Swish and Spit three times a day  Nephro-lynda 1 Tablet(s) Oral daily  polyethylene glycol 3350 17 Gram(s) Oral daily  predniSONE   Tablet 20 milliGRAM(s) Oral daily  senna 2 Tablet(s) Oral at bedtime  warfarin 2.5 milliGRAM(s) Oral once    MEDICATIONS  (PRN):  benzocaine 15 mG/menthol 3.6 mG Lozenge 1 Lozenge Oral every 6 hours PRN Sore Throat  dextrose Gel 1 Dose(s) Oral once PRN Blood Glucose LESS THAN 70 milliGRAM(s)/deciLiter  dextrose Gel 1 Dose(s) Oral once PRN Blood Glucose LESS THAN 70 milliGRAM(s)/deciliter  glucagon  Injectable 1 milliGRAM(s) IntraMuscular once PRN Glucose <70 milliGRAM(s)/deciLiter  guaiFENesin    Syrup 100 milliGRAM(s) Oral every 6 hours PRN Cough          Vital Signs Last 24 Hrs  T(C): 36.6 (27 Feb 2018 05:00), Max: 36.7 (26 Feb 2018 12:20)  T(F): 97.9 (27 Feb 2018 05:00), Max: 98 (26 Feb 2018 12:20)  HR: 85 (27 Feb 2018 08:47) (70 - 968)  BP: 111/60 (27 Feb 2018 05:00) (109/71 - 130/45)  BP(mean): --  RR: 20 (27 Feb 2018 05:00) (18 - 23)  SpO2: 95% (27 Feb 2018 08:47) (91% - 99%)          02-26 @ 07:01  -  02-27 @ 07:00  --------------------------------------------------------  IN: 500 mL / OUT: 2500 mL / NET: -2000 mL          LABS:                        11.0   7.51  )-----------( 138      ( 27 Feb 2018 06:30 )             35.1     02-27    133<L>  |  94<L>  |  36<H>  ----------------------------<  117<H>  3.4<L>   |  23  |  5.08<H>    Ca    8.4      27 Feb 2018 06:30  Phos  2.9     02-27  Mg     1.9     02-27    TPro  7.7  /  Alb  2.8<L>  /  TBili  0.5  /  DBili  0.3<H>  /  AST  73<H>  /  ALT  61<H>  /  AlkPhos  216<H>  02-27          CAPILLARY BLOOD GLUCOSE      POCT Blood Glucose.: 158 mg/dL (26 Feb 2018 21:30)    PT/INR - ( 27 Feb 2018 06:30 )   PT: 34.6 SEC;   INR: 2.94          PTT - ( 26 Feb 2018 06:08 )  PTT:52.3 SEC                    CULTURES: (if applicable)          Physical Examination:  PULM: decreased bs bilaterally, no significant sputum production  CVS: S1, S2 heard    RADIOLOGY REVIEWED  CXR: < from: Xray Chest 1 View- PORTABLE-Urgent (02.26.18 @ 11:27) >  IMPRESSION:    Persistent diffuse interstitial and reticular lung opacities bilaterally,   mildly improved since 2/17/2018, which may represent pulmonary edema   superimposed on patient's known interstitial lung disease.    Small bilateral pleural effusions.    < end of copied text >      CT chest:< from: CT Chest No Cont (06.05.17 @ 15:45) >  IMPRESSION:    Extensive pulmonary fibrosis.    Evidence of superimposed mild small airway disease.    < end of copied text >      TTE:< from: Transthoracic Echocardiogram (10.18.17 @ 09:48) >  ONCLUSIONS:  1. Normal trileaflet aortic valve.  2. Severely dilated left atrium.  LA volume index = 56  cc/m2.  3. Moderate left ventricular enlargement.  4. Severe global left ventricular systolic dysfunction.  Flattening of the interventricular septum in both systole  and diastole is  consistent with right ventricular pressure  overload.  5. Right ventricular enlargement with decreased right  ventricular systolic function. A device wire is noted in  the right heart.  6. Normal tricuspid valve.  Moderate tricuspid  regurgitation.  7. Normal pulmonic valve. Mild pulmonic regurgitation.  Estimated pulmonary artery systolic pressure equals 55 mm  Hg, assuming right atrial pressure equals 10  mm Hg,  consistent with moderate pulmonary hypertension.  8. Agitated saline injection resulted in a large amount of  bubbles seen in the right heart. Although the exact amount  of cardiac cycles that occurred prior to the crossing of  the bubbles is unclear, the findings are suggestive of a  significant shunt. Consider MARGIE for further workup, if  clinically warrnated.    < end of copied text > Follow-up Pulm Progress Note    Pulmonary for Mehrcoral  No new respiratory events overnight.  Denies SOB/CP. wore bipap last night, on 50% VM  s/p HD yesterday with removal of 2liters    Medications:  MEDICATIONS  (STANDING):  ALBUTerol/ipratropium for Nebulization 3 milliLiter(s) Nebulizer every 6 hours  amiodarone    Tablet 100 milliGRAM(s) Oral daily  aspirin enteric coated 81 milliGRAM(s) Oral daily  atorvastatin 80 milliGRAM(s) Oral at bedtime  buDESOnide   90 MICROgram(s) Inhaler 2 Puff(s) Inhalation two times a day  chlorhexidine 4% Liquid 1 Application(s) Topical daily  dextrose 5%. 1000 milliLiter(s) (50 mL/Hr) IV Continuous <Continuous>  dextrose 5%. 1000 milliLiter(s) (50 mL/Hr) IV Continuous <Continuous>  dextrose 50% Injectable 12.5 Gram(s) IV Push once  dextrose 50% Injectable 25 Gram(s) IV Push once  dextrose 50% Injectable 25 Gram(s) IV Push once  DOBUTamine Infusion 7.5 MICROgram(s)/kG/Min (16.942 mL/Hr) IV Continuous <Continuous>  docusate sodium 100 milliGRAM(s) Oral two times a day  finasteride 5 milliGRAM(s) Oral daily  influenza   Vaccine 0.5 milliLiter(s) IntraMuscular once  insulin glargine Injectable (LANTUS) 12 Unit(s) SubCutaneous at bedtime  insulin lispro (HumaLOG) corrective regimen sliding scale   SubCutaneous three times a day before meals  insulin lispro (HumaLOG) corrective regimen sliding scale   SubCutaneous at bedtime  levothyroxine 75 MICROGram(s) Oral daily  midodrine 30 milliGRAM(s) Oral three times a day  MIRACLE MOUTHWASH 30 milliLiter(s) Swish and Spit three times a day  Nephro-lynda 1 Tablet(s) Oral daily  polyethylene glycol 3350 17 Gram(s) Oral daily  predniSONE   Tablet 20 milliGRAM(s) Oral daily  senna 2 Tablet(s) Oral at bedtime  warfarin 2.5 milliGRAM(s) Oral once    MEDICATIONS  (PRN):  benzocaine 15 mG/menthol 3.6 mG Lozenge 1 Lozenge Oral every 6 hours PRN Sore Throat  dextrose Gel 1 Dose(s) Oral once PRN Blood Glucose LESS THAN 70 milliGRAM(s)/deciLiter  dextrose Gel 1 Dose(s) Oral once PRN Blood Glucose LESS THAN 70 milliGRAM(s)/deciliter  glucagon  Injectable 1 milliGRAM(s) IntraMuscular once PRN Glucose <70 milliGRAM(s)/deciLiter  guaiFENesin    Syrup 100 milliGRAM(s) Oral every 6 hours PRN Cough    Vital Signs Last 24 Hrs  T(C): 36.6 (27 Feb 2018 05:00), Max: 36.7 (26 Feb 2018 12:20)  T(F): 97.9 (27 Feb 2018 05:00), Max: 98 (26 Feb 2018 12:20)  HR: 85 (27 Feb 2018 08:47) (70 - 968)  BP: 111/60 (27 Feb 2018 05:00) (109/71 - 130/45)  BP(mean): --  RR: 20 (27 Feb 2018 05:00) (18 - 23)  SpO2: 95% (27 Feb 2018 08:47) (91% - 99%)    02-26 @ 07:01  -  02-27 @ 07:00  --------------------------------------------------------  IN: 500 mL / OUT: 2500 mL / NET: -2000 mL    LABS:                        11.0   7.51  )-----------( 138      ( 27 Feb 2018 06:30 )             35.1     02-27    133<L>  |  94<L>  |  36<H>  ----------------------------<  117<H>  3.4<L>   |  23  |  5.08<H>    Ca    8.4      27 Feb 2018 06:30  Phos  2.9     02-27  Mg     1.9     02-27    TPro  7.7  /  Alb  2.8<L>  /  TBili  0.5  /  DBili  0.3<H>  /  AST  73<H>  /  ALT  61<H>  /  AlkPhos  216<H>  02-27      CAPILLARY BLOOD GLUCOSE      POCT Blood Glucose.: 158 mg/dL (26 Feb 2018 21:30)    PT/INR - ( 27 Feb 2018 06:30 )   PT: 34.6 SEC;   INR: 2.94          PTT - ( 26 Feb 2018 06:08 )  PTT:52.3 SEC    CULTURES: (if applicable)      Physical Examination:  PULM: decreased bs bilaterally, no significant sputum production  CVS: S1, S2 heard    RADIOLOGY REVIEWED  CXR: < from: Xray Chest 1 View- PORTABLE-Urgent (02.26.18 @ 11:27) >  IMPRESSION:    Persistent diffuse interstitial and reticular lung opacities bilaterally,   mildly improved since 2/17/2018, which may represent pulmonary edema   superimposed on patient's known interstitial lung disease.    Small bilateral pleural effusions.    < end of copied text >      CT chest:< from: CT Chest No Cont (06.05.17 @ 15:45) >  IMPRESSION:    Extensive pulmonary fibrosis.    Evidence of superimposed mild small airway disease.    < end of copied text >      TTE:< from: Transthoracic Echocardiogram (10.18.17 @ 09:48) >  ONCLUSIONS:  1. Normal trileaflet aortic valve.  2. Severely dilated left atrium.  LA volume index = 56  cc/m2.  3. Moderate left ventricular enlargement.  4. Severe global left ventricular systolic dysfunction.  Flattening of the interventricular septum in both systole  and diastole is  consistent with right ventricular pressure  overload.  5. Right ventricular enlargement with decreased right  ventricular systolic function. A device wire is noted in  the right heart.  6. Normal tricuspid valve.  Moderate tricuspid  regurgitation.  7. Normal pulmonic valve. Mild pulmonic regurgitation.  Estimated pulmonary artery systolic pressure equals 55 mm  Hg, assuming right atrial pressure equals 10  mm Hg,  consistent with moderate pulmonary hypertension.  8. Agitated saline injection resulted in a large amount of  bubbles seen in the right heart. Although the exact amount  of cardiac cycles that occurred prior to the crossing of  the bubbles is unclear, the findings are suggestive of a  significant shunt. Consider MARGIE for further workup, if  clinically warrnated.    < end of copied text >

## 2018-02-27 NOTE — PROGRESS NOTE ADULT - SUBJECTIVE AND OBJECTIVE BOX
Pt seen and examined at bedside  No new complaints. Dyspnea at baseline.     Allergies:  No Known Allergies    Hospital Medications:   MEDICATIONS  (STANDING):  ALBUTerol/ipratropium for Nebulization 3 milliLiter(s) Nebulizer every 6 hours  amiodarone    Tablet 100 milliGRAM(s) Oral daily  aspirin enteric coated 81 milliGRAM(s) Oral daily  atorvastatin 80 milliGRAM(s) Oral at bedtime  buDESOnide   90 MICROgram(s) Inhaler 2 Puff(s) Inhalation two times a day  chlorhexidine 4% Liquid 1 Application(s) Topical daily  dextrose 5%. 1000 milliLiter(s) (50 mL/Hr) IV Continuous <Continuous>  dextrose 5%. 1000 milliLiter(s) (50 mL/Hr) IV Continuous <Continuous>  dextrose 50% Injectable 12.5 Gram(s) IV Push once  dextrose 50% Injectable 25 Gram(s) IV Push once  dextrose 50% Injectable 25 Gram(s) IV Push once  DOBUTamine Infusion 7.5 MICROgram(s)/kG/Min (16.942 mL/Hr) IV Continuous <Continuous>  docusate sodium 100 milliGRAM(s) Oral two times a day  finasteride 5 milliGRAM(s) Oral daily  influenza   Vaccine 0.5 milliLiter(s) IntraMuscular once  insulin glargine Injectable (LANTUS) 12 Unit(s) SubCutaneous at bedtime  insulin lispro (HumaLOG) corrective regimen sliding scale   SubCutaneous three times a day before meals  insulin lispro (HumaLOG) corrective regimen sliding scale   SubCutaneous at bedtime  levothyroxine 75 MICROGram(s) Oral daily  midodrine 30 milliGRAM(s) Oral three times a day  MIRACLE MOUTHWASH 30 milliLiter(s) Swish and Spit three times a day  Nephro-lynda 1 Tablet(s) Oral daily  polyethylene glycol 3350 17 Gram(s) Oral daily  predniSONE   Tablet 20 milliGRAM(s) Oral daily  senna 2 Tablet(s) Oral at bedtime  warfarin 2.5 milliGRAM(s) Oral once      VITALS:  T(F): 97 (02-27-18 @ 12:41), Max: 98 (02-27-18 @ 02:02)  HR: 86 (02-27-18 @ 12:41)  BP: 120/54 (02-27-18 @ 12:41)  RR: 18 (02-27-18 @ 12:41)  SpO2: 93% (02-27-18 @ 12:41)  Wt(kg): --    02-26 @ 07:01  -  02-27 @ 07:00  --------------------------------------------------------  IN: 500 mL / OUT: 2500 mL / NET: -2000 mL      PHYSICAL EXAM:  Constitutional: NAD  HEENT: anicteric sclera, oropharynx clear, MMM  Neck: No JVD  Respiratory: Fine bilateral crackles.   Cardiovascular: S1, S2, RRR  Gastrointestinal: BS+, soft, NT/ND  Extremities: No cyanosis or clubbing. 1+ peripheral edema  Neurological: A/O x 3, no focal deficits  Psychiatric: Normal mood, normal affect  : No CVA tenderness. No rosa.   Skin: No rashes  Vascular Access: RIJ tunneled cath     LABS:  02-27    133<L>  |  94<L>  |  36<H>  ----------------------------<  117<H>  3.4<L>   |  23  |  5.08<H>    Ca    8.4      27 Feb 2018 06:30  Phos  2.9     02-27  Mg     1.9     02-27    TPro  7.7  /  Alb  2.8<L>  /  TBili  0.5  /  DBili  0.3<H>  /  AST  73<H>  /  ALT  61<H>  /  AlkPhos  216<H>  02-27    Creatinine Trend: 5.08 <--, 7.29 <--, 5.93 <--, 4.87 <--, 6.47 <--, 5.48 <--, 6.31 <--                        11.0   7.51  )-----------( 138      ( 27 Feb 2018 06:30 )             35.1     Urine Studies:      RADIOLOGY & ADDITIONAL STUDIES:

## 2018-02-27 NOTE — PROGRESS NOTE ADULT - PROBLEM SELECTOR PLAN 1
HD yesterday, with 2Kg achieved.   K acceptable. Plan for repeat HD today to optimze fluid status.   chronic volume overload 2/2 severe CMP  Continue midodrine.   c/w renal diet, add fluid restriction 1.2L/day, reinforced w/pt

## 2018-02-27 NOTE — PROGRESS NOTE ADULT - PROBLEM SELECTOR PLAN 1
has underlying pulmonary fibrosis and respiratory failure precipitated by influenza:   Titrate fio2 to keep o2 sat >=90% remains on 50% VM  NIV QHS/PRV

## 2018-02-27 NOTE — PROGRESS NOTE ADULT - PROBLEM SELECTOR PLAN 6
HR controlled  management as per cards HR controlled  ac as per cards/medicine  management as per cards

## 2018-02-27 NOTE — PROGRESS NOTE ADULT - PROBLEM SELECTOR PLAN 4
titrate oxygen to keep o2 sat>=90%  c/w duonebs and pulmicort  2/26 began predinsone 20mg po daily titrate oxygen to keep o2 sat>=90%  c/w duonebs and pulmicort  2/26 began prednisone 20mg po daily

## 2018-02-27 NOTE — PROGRESS NOTE ADULT - PROBLEM SELECTOR PLAN 1
Patient initially admitted with acute on chronic hypoxic respiratory failure secondary to influenza and underlying COPD, pulmonary fibrosis. s/p course of Tamiflu and CCU stay for acute on chronic hypotension. F/U with Pulmonology - attempt to wean to O2 via NC -supplemental oxygen to maintain O2 sat >90%  -BiPAP hs -on prednisone 20mg daily

## 2018-02-27 NOTE — PROGRESS NOTE ADULT - PROBLEM SELECTOR PLAN 3
: supportive treatment:  titrate oxygen to keep o2 sat>=90%  2/26 began trial prednisone for sob-prednisone 20 mg po daily

## 2018-02-27 NOTE — PROGRESS NOTE ADULT - PROBLEM SELECTOR PLAN 7
HR at goal. Not a candidate for beta blockers given chronic hypotension.  On warfarin. Dose coumadin. Monitor INR. c/w amiodarone

## 2018-02-27 NOTE — PROGRESS NOTE ADULT - SUBJECTIVE AND OBJECTIVE BOX
CC: F/U for respiratory failure    SUBJECTIVE / OVERNIGHT EVENTS:  Patient still complaining about dyspnea at rest.  No F/C, N/V, CP, lightheadedness, dizziness, abdominal pain, diarrhea, dysuria.    MEDICATIONS  (STANDING):  ALBUTerol/ipratropium for Nebulization 3 milliLiter(s) Nebulizer every 6 hours  amiodarone    Tablet 100 milliGRAM(s) Oral daily  aspirin enteric coated 81 milliGRAM(s) Oral daily  atorvastatin 80 milliGRAM(s) Oral at bedtime  buDESOnide   90 MICROgram(s) Inhaler 2 Puff(s) Inhalation two times a day  chlorhexidine 4% Liquid 1 Application(s) Topical daily  dextrose 5%. 1000 milliLiter(s) (50 mL/Hr) IV Continuous <Continuous>  dextrose 5%. 1000 milliLiter(s) (50 mL/Hr) IV Continuous <Continuous>  dextrose 50% Injectable 12.5 Gram(s) IV Push once  dextrose 50% Injectable 25 Gram(s) IV Push once  dextrose 50% Injectable 25 Gram(s) IV Push once  DOBUTamine Infusion 7.5 MICROgram(s)/kG/Min (16.942 mL/Hr) IV Continuous <Continuous>  docusate sodium 100 milliGRAM(s) Oral two times a day  finasteride 5 milliGRAM(s) Oral daily  influenza   Vaccine 0.5 milliLiter(s) IntraMuscular once  insulin glargine Injectable (LANTUS) 12 Unit(s) SubCutaneous at bedtime  insulin lispro (HumaLOG) corrective regimen sliding scale   SubCutaneous three times a day before meals  insulin lispro (HumaLOG) corrective regimen sliding scale   SubCutaneous at bedtime  levothyroxine 75 MICROGram(s) Oral daily  midodrine 30 milliGRAM(s) Oral three times a day  MIRACLE MOUTHWASH 30 milliLiter(s) Swish and Spit three times a day  Nephro-lynda 1 Tablet(s) Oral daily  polyethylene glycol 3350 17 Gram(s) Oral daily  predniSONE   Tablet 20 milliGRAM(s) Oral daily  senna 2 Tablet(s) Oral at bedtime  warfarin 2.5 milliGRAM(s) Oral once    MEDICATIONS  (PRN):  benzocaine 15 mG/menthol 3.6 mG Lozenge 1 Lozenge Oral every 6 hours PRN Sore Throat  dextrose Gel 1 Dose(s) Oral once PRN Blood Glucose LESS THAN 70 milliGRAM(s)/deciLiter  dextrose Gel 1 Dose(s) Oral once PRN Blood Glucose LESS THAN 70 milliGRAM(s)/deciliter  glucagon  Injectable 1 milliGRAM(s) IntraMuscular once PRN Glucose <70 milliGRAM(s)/deciLiter  guaiFENesin    Syrup 100 milliGRAM(s) Oral every 6 hours PRN Cough      Vital Signs Last 24 Hrs  T(C): 36.1 (27 Feb 2018 12:41), Max: 36.7 (27 Feb 2018 02:02)  T(F): 97 (27 Feb 2018 12:41), Max: 98 (27 Feb 2018 02:02)  HR: 86 (27 Feb 2018 12:41) (70 - 968)  BP: 120/54 (27 Feb 2018 12:41) (109/71 - 130/45)  BP(mean): --  RR: 18 (27 Feb 2018 12:41) (18 - 23)  SpO2: 93% (27 Feb 2018 12:41) (93% - 99%)  CAPILLARY BLOOD GLUCOSE      POCT Blood Glucose.: 368 mg/dL (27 Feb 2018 12:56)  POCT Blood Glucose.: 192 mg/dL (27 Feb 2018 09:12)  POCT Blood Glucose.: 158 mg/dL (26 Feb 2018 21:30)  POCT Blood Glucose.: 233 mg/dL (26 Feb 2018 16:39)    I&O's Summary    26 Feb 2018 07:01  -  27 Feb 2018 07:00  --------------------------------------------------------  IN: 500 mL / OUT: 2500 mL / NET: -2000 mL        PHYSICAL EXAM:  GENERAL: NAD, frail appearing  HEAD:  Atraumatic, Normocephalic  EYES: EOMI, PERRLA, conjunctiva and sclera clear  NECK: Supple.  CHEST/LUNG: Diffuse crackles on inspiration.  No wheeze.  HEART: Afib; No murmurs, rubs, or gallops  ABDOMEN: Soft, Nontender, Nondistended; Bowel sounds present  EXTREMITIES:  2+ Peripheral Pulses, No clubbing, cyanosis, or edema, LUE PICC in place.  PSYCH: Calm  NEUROLOGY: A/Ox3, non-focal  SKIN: No rashes or lesions    LABS:                        11.0   7.51  )-----------( 138      ( 27 Feb 2018 06:30 )             35.1     02-27    133<L>  |  94<L>  |  36<H>  ----------------------------<  117<H>  3.4<L>   |  23  |  5.08<H>    Ca    8.4      27 Feb 2018 06:30  Phos  2.9     02-27  Mg     1.9     02-27    TPro  7.7  /  Alb  2.8<L>  /  TBili  0.5  /  DBili  0.3<H>  /  AST  73<H>  /  ALT  61<H>  /  AlkPhos  216<H>  02-27    PT/INR - ( 27 Feb 2018 06:30 )   PT: 34.6 SEC;   INR: 2.94          PTT - ( 26 Feb 2018 06:08 )  PTT:52.3 SEC          RADIOLOGY & ADDITIONAL TESTS:    Imaging Personally Reviewed:    Care Discussed with Consultants/Other Providers:

## 2018-02-27 NOTE — PROGRESS NOTE ADULT - SUBJECTIVE AND OBJECTIVE BOX
Currently undergoing HD  Continues to have a cough but his respiratory status is slightly improved  Remains on Dobutamine 7.5 mcg/kg/min  /69  HR 84  Bilateral crackles  RR 2/6 systolic murmur  1+ edema to the thighs    INR 2.94  WBC 7.51 Hgb 11.0   Hct 35.1  Plt 138K    Imp: Severe non-ischemic cardiomyopathy  His BP is at his baseline.  In fact he is likely mildly fluid overloaded.  He continues to require BIPAP at night.  In terms of a bubble study or a MARGIE to R/O a right to left shunt causing hypoxemia, he is not a candidate for a ASD or a PFO closure via catheter intervention.  The hope is that he will slowly recover some lung function over time from the Influenza attack

## 2018-02-27 NOTE — PROGRESS NOTE ADULT - ASSESSMENT
62 M PMH ESRD on HD, NICM with severe systolic dysfunction (EF 21%), on chronic dobutamine gtt, also with AICD, chronic hypotension on midodrine, atrial fibrillation on warfarin, COPD, pulmonary fibrosis on home O2, initially presented with acute on chronic respiratory failure in setting of influenza B infection, also with exacerbation of chronic hypoxic respiratory failure. He was managed in the CCU on norepinephrine - transition to dobutamine/midodrine.

## 2018-02-28 LAB
BUN SERPL-MCNC: 35 MG/DL — HIGH (ref 7–23)
CALCIUM SERPL-MCNC: 8.6 MG/DL — SIGNIFICANT CHANGE UP (ref 8.4–10.5)
CHLORIDE SERPL-SCNC: 95 MMOL/L — LOW (ref 98–107)
CO2 SERPL-SCNC: 26 MMOL/L — SIGNIFICANT CHANGE UP (ref 22–31)
CREAT SERPL-MCNC: 3.92 MG/DL — HIGH (ref 0.5–1.3)
GLUCOSE SERPL-MCNC: 235 MG/DL — HIGH (ref 70–99)
HCT VFR BLD CALC: 33 % — LOW (ref 39–50)
HGB BLD-MCNC: 10.7 G/DL — LOW (ref 13–17)
INR BLD: 1.96 — HIGH (ref 0.88–1.17)
MAGNESIUM SERPL-MCNC: 1.9 MG/DL — SIGNIFICANT CHANGE UP (ref 1.6–2.6)
MCHC RBC-ENTMCNC: 31.1 PG — SIGNIFICANT CHANGE UP (ref 27–34)
MCHC RBC-ENTMCNC: 32.4 % — SIGNIFICANT CHANGE UP (ref 32–36)
MCV RBC AUTO: 95.9 FL — SIGNIFICANT CHANGE UP (ref 80–100)
NRBC # FLD: 0 — SIGNIFICANT CHANGE UP
PHOSPHATE SERPL-MCNC: 2.6 MG/DL — SIGNIFICANT CHANGE UP (ref 2.5–4.5)
PLATELET # BLD AUTO: 124 K/UL — LOW (ref 150–400)
PMV BLD: 13 FL — SIGNIFICANT CHANGE UP (ref 7–13)
POTASSIUM SERPL-MCNC: 4.1 MMOL/L — SIGNIFICANT CHANGE UP (ref 3.5–5.3)
POTASSIUM SERPL-SCNC: 4.1 MMOL/L — SIGNIFICANT CHANGE UP (ref 3.5–5.3)
PROTHROM AB SERPL-ACNC: 22.9 SEC — HIGH (ref 9.8–13.1)
RBC # BLD: 3.44 M/UL — LOW (ref 4.2–5.8)
RBC # FLD: 17 % — HIGH (ref 10.3–14.5)
SODIUM SERPL-SCNC: 134 MMOL/L — LOW (ref 135–145)
WBC # BLD: 8.45 K/UL — SIGNIFICANT CHANGE UP (ref 3.8–10.5)
WBC # FLD AUTO: 8.45 K/UL — SIGNIFICANT CHANGE UP (ref 3.8–10.5)

## 2018-02-28 PROCEDURE — 99233 SBSQ HOSP IP/OBS HIGH 50: CPT

## 2018-02-28 RX ORDER — INSULIN GLARGINE 100 [IU]/ML
16 INJECTION, SOLUTION SUBCUTANEOUS AT BEDTIME
Qty: 0 | Refills: 0 | Status: DISCONTINUED | OUTPATIENT
Start: 2018-02-28 | End: 2018-03-05

## 2018-02-28 RX ORDER — WARFARIN SODIUM 2.5 MG/1
3 TABLET ORAL ONCE
Qty: 0 | Refills: 0 | Status: COMPLETED | OUTPATIENT
Start: 2018-02-28 | End: 2018-02-28

## 2018-02-28 RX ADMIN — WARFARIN SODIUM 3 MILLIGRAM(S): 2.5 TABLET ORAL at 17:48

## 2018-02-28 RX ADMIN — Medication 6: at 17:49

## 2018-02-28 RX ADMIN — Medication 2 PUFF(S): at 22:12

## 2018-02-28 RX ADMIN — DIPHENHYDRAMINE HYDROCHLORIDE AND LIDOCAINE HYDROCHLORIDE AND ALUMINUM HYDROXIDE AND MAGNESIUM HYDRO 30 MILLILITER(S): KIT at 22:13

## 2018-02-28 RX ADMIN — Medication 3 MILLILITER(S): at 21:15

## 2018-02-28 RX ADMIN — Medication 2: at 23:13

## 2018-02-28 RX ADMIN — Medication 2 PUFF(S): at 09:24

## 2018-02-28 RX ADMIN — MIDODRINE HYDROCHLORIDE 30 MILLIGRAM(S): 2.5 TABLET ORAL at 22:13

## 2018-02-28 RX ADMIN — Medication 2: at 09:25

## 2018-02-28 RX ADMIN — ATORVASTATIN CALCIUM 80 MILLIGRAM(S): 80 TABLET, FILM COATED ORAL at 22:12

## 2018-02-28 RX ADMIN — DIPHENHYDRAMINE HYDROCHLORIDE AND LIDOCAINE HYDROCHLORIDE AND ALUMINUM HYDROXIDE AND MAGNESIUM HYDRO 30 MILLILITER(S): KIT at 14:04

## 2018-02-28 RX ADMIN — Medication 3 MILLILITER(S): at 08:35

## 2018-02-28 RX ADMIN — Medication 81 MILLIGRAM(S): at 14:02

## 2018-02-28 RX ADMIN — Medication 3 MILLILITER(S): at 04:33

## 2018-02-28 RX ADMIN — Medication 1 TABLET(S): at 14:03

## 2018-02-28 RX ADMIN — Medication 4: at 14:02

## 2018-02-28 RX ADMIN — Medication 3 MILLILITER(S): at 16:00

## 2018-02-28 RX ADMIN — MIDODRINE HYDROCHLORIDE 30 MILLIGRAM(S): 2.5 TABLET ORAL at 14:03

## 2018-02-28 RX ADMIN — Medication 75 MICROGRAM(S): at 06:50

## 2018-02-28 RX ADMIN — Medication 20 MILLIGRAM(S): at 06:51

## 2018-02-28 RX ADMIN — INSULIN GLARGINE 16 UNIT(S): 100 INJECTION, SOLUTION SUBCUTANEOUS at 23:13

## 2018-02-28 RX ADMIN — FINASTERIDE 5 MILLIGRAM(S): 5 TABLET, FILM COATED ORAL at 14:02

## 2018-02-28 RX ADMIN — CHLORHEXIDINE GLUCONATE 1 APPLICATION(S): 213 SOLUTION TOPICAL at 14:00

## 2018-02-28 RX ADMIN — AMIODARONE HYDROCHLORIDE 100 MILLIGRAM(S): 400 TABLET ORAL at 06:49

## 2018-02-28 RX ADMIN — DIPHENHYDRAMINE HYDROCHLORIDE AND LIDOCAINE HYDROCHLORIDE AND ALUMINUM HYDROXIDE AND MAGNESIUM HYDRO 30 MILLILITER(S): KIT at 06:52

## 2018-02-28 RX ADMIN — MIDODRINE HYDROCHLORIDE 30 MILLIGRAM(S): 2.5 TABLET ORAL at 06:51

## 2018-02-28 NOTE — PROGRESS NOTE ADULT - SUBJECTIVE AND OBJECTIVE BOX
Patient is a 62y old  Male who presents with a chief complaint of SOB x few hours (20 Feb 2018 18:05)      Any change in ROS: Doing ok  still with some SOB    MEDICATIONS  (STANDING):  ALBUTerol/ipratropium for Nebulization 3 milliLiter(s) Nebulizer every 6 hours  amiodarone    Tablet 100 milliGRAM(s) Oral daily  aspirin enteric coated 81 milliGRAM(s) Oral daily  atorvastatin 80 milliGRAM(s) Oral at bedtime  buDESOnide   90 MICROgram(s) Inhaler 2 Puff(s) Inhalation two times a day  chlorhexidine 4% Liquid 1 Application(s) Topical daily  dextrose 5%. 1000 milliLiter(s) (50 mL/Hr) IV Continuous <Continuous>  dextrose 5%. 1000 milliLiter(s) (50 mL/Hr) IV Continuous <Continuous>  dextrose 50% Injectable 12.5 Gram(s) IV Push once  dextrose 50% Injectable 25 Gram(s) IV Push once  dextrose 50% Injectable 25 Gram(s) IV Push once  DOBUTamine Infusion 7.5 MICROgram(s)/kG/Min (16.942 mL/Hr) IV Continuous <Continuous>  docusate sodium 100 milliGRAM(s) Oral two times a day  finasteride 5 milliGRAM(s) Oral daily  influenza   Vaccine 0.5 milliLiter(s) IntraMuscular once  insulin glargine Injectable (LANTUS) 16 Unit(s) SubCutaneous at bedtime  insulin lispro (HumaLOG) corrective regimen sliding scale   SubCutaneous three times a day before meals  insulin lispro (HumaLOG) corrective regimen sliding scale   SubCutaneous at bedtime  levothyroxine 75 MICROGram(s) Oral daily  midodrine 30 milliGRAM(s) Oral three times a day  MIRACLE MOUTHWASH 30 milliLiter(s) Swish and Spit three times a day  Nephro-lynda 1 Tablet(s) Oral daily  polyethylene glycol 3350 17 Gram(s) Oral daily  predniSONE   Tablet 20 milliGRAM(s) Oral daily  senna 2 Tablet(s) Oral at bedtime  warfarin 3 milliGRAM(s) Oral once    MEDICATIONS  (PRN):  benzocaine 15 mG/menthol 3.6 mG Lozenge 1 Lozenge Oral every 6 hours PRN Sore Throat  dextrose Gel 1 Dose(s) Oral once PRN Blood Glucose LESS THAN 70 milliGRAM(s)/deciLiter  dextrose Gel 1 Dose(s) Oral once PRN Blood Glucose LESS THAN 70 milliGRAM(s)/deciliter  glucagon  Injectable 1 milliGRAM(s) IntraMuscular once PRN Glucose <70 milliGRAM(s)/deciLiter  guaiFENesin    Syrup 100 milliGRAM(s) Oral every 6 hours PRN Cough    Vital Signs Last 24 Hrs  T(C): 35.7 (28 Feb 2018 14:23), Max: 36.6 (27 Feb 2018 16:15)  T(F): 96.3 (28 Feb 2018 14:23), Max: 97.9 (27 Feb 2018 16:15)  HR: 84 (28 Feb 2018 14:23) (74 - 89)  BP: 123/63 (28 Feb 2018 14:23) (101/64 - 123/83)  BP(mean): --  RR: 18 (28 Feb 2018 11:00) (18 - 20)  SpO2: 99% (28 Feb 2018 14:23) (92% - 99%)    I&O's Summary    27 Feb 2018 07:01  -  28 Feb 2018 07:00  --------------------------------------------------------  IN: 400 mL / OUT: 2400 mL / NET: -2000 mL          Physical Exam:   GENERAL: NAD, well-groomed, well-developed  HEENT: BELL/   Atraumatic, Normocephalic  ENMT: No tonsillar erythema, exudates, or enlargement; Moist mucous membranes, Good dentition, No lesions  NECK: Supple, No JVD, Normal thyroid  CHEST/LUNG: bibasilar crackles   CVS: Regular rate and rhythm; No murmurs, rubs, or gallops  GI: : Soft, Nontender, Nondistended; Bowel sounds present  NERVOUS SYSTEM:  Alert & Oriented X3  EXTREMITIES:  +edema  LYMPH: No lymphadenopathy noted  SKIN: No rashes or lesions  ENDOCRINOLOGY: No Thyromegaly  PSYCH: Appropriate    Labs:  25                            10.7   8.45  )-----------( 124      ( 28 Feb 2018 09:35 )             33.0                         11.0   7.51  )-----------( 138      ( 27 Feb 2018 06:30 )             35.1                         10.0   7.22  )-----------( 119      ( 26 Feb 2018 06:05 )             31.8                         11.1   5.69  )-----------( 107      ( 25 Feb 2018 05:26 )             34.8     02-28    134<L>  |  95<L>  |  35<H>  ----------------------------<  235<H>  4.1   |  26  |  3.92<H>  02-27    133<L>  |  94<L>  |  36<H>  ----------------------------<  117<H>  3.4<L>   |  23  |  5.08<H>  02-26    128<L>  |  88<L>  |  52<H>  ----------------------------<  324<H>  4.3   |  22  |  7.29<H>  02-25    129<L>  |  90<L>  |  37<H>  ----------------------------<  182<H>  4.2   |  24  |  5.93<H>    Ca    8.6      28 Feb 2018 09:35  Ca    8.4      27 Feb 2018 06:30  Phos  2.6     02-28  Phos  2.9     02-27  Mg     1.9     02-28  Mg     1.9     02-27    TPro  7.7  /  Alb  2.8<L>  /  TBili  0.5  /  DBili  0.3<H>  /  AST  73<H>  /  ALT  61<H>  /  AlkPhos  216<H>  02-27    CAPILLARY BLOOD GLUCOSE      POCT Blood Glucose.: 344 mg/dL (28 Feb 2018 13:04)  POCT Blood Glucose.: 218 mg/dL (28 Feb 2018 09:16)  POCT Blood Glucose.: 178 mg/dL (27 Feb 2018 21:15)  POCT Blood Glucose.: 181 mg/dL (27 Feb 2018 19:09)      LIVER FUNCTIONS - ( 27 Feb 2018 06:30 )  Alb: 2.8 g/dL / Pro: 7.7 g/dL / ALK PHOS: 216 u/L / ALT: 61 u/L / AST: 73 u/L / GGT: x           PT/INR - ( 28 Feb 2018 09:35 )   PT: 22.9 SEC;   INR: 1.96              Cultures:         < from: Xray Chest 1 View- PORTABLE-Urgent (02.26.18 @ 11:27) >  Cardiomyopathy.    TECHNIQUE: AP chest radiograph    COMPARISON: Chest radiograph performed 2/17/2018. CT abdomen and pelvis   performed 10/30/2017.    FINDINGS:    A right-sided dialysis catheter terminates over SVC. There is a   left-sided cardiac device. There is redemonstration of diffuse   interstitial and reticular opacities bilaterally, mildly improved since   2/17/2018. There are small bilateral pleural effusions.    IMPRESSION:    Persistent diffuse interstitial and reticular lung opacities bilaterally,   mildly improved since 2/17/2018, which may represent pulmonary edema   superimposed on patient's known interstitial lung disease.    Small bilateral pleural effusions.              ANNIA MURPHY M.D., RADIOLOGY RESIDENT  This document has been electronically signed.  NITIN MAHARAJ M.D. ATTENDING RADIOLOGIST  This document has been electronically signed. Feb 26 2018  2:54PM        < end of copied text >                      Studies  Chest X-RAY  CT SCAN Chest   Venous Dopplers: LE:   CT Abdomen  Others

## 2018-02-28 NOTE — PROGRESS NOTE ADULT - PROBLEM SELECTOR PLAN 2
s/p AICD and on chronic dobutamine. Patient chronically hypervolemic.  -c/w dobutamine infusion - HD/UF per renal

## 2018-02-28 NOTE — PROGRESS NOTE ADULT - PROBLEM SELECTOR PLAN 4
titrate oxygen to keep o2 sat>=90%  c/w suman and pulmicort  2/26 began prednisone 20mg po daily  2/28: cont steroids to see how he responds to it in next few days

## 2018-02-28 NOTE — PROGRESS NOTE ADULT - PROBLEM SELECTOR PLAN 1
HD yesterday, with 2Kg achieved.   K acceptable. Plan for repeat HD tomorrow.   Continue midodrine.   c/w renal diet, add fluid restriction 1.2L/day, reinforced w/pt

## 2018-02-28 NOTE — PROGRESS NOTE ADULT - SUBJECTIVE AND OBJECTIVE BOX
CC: F/U for respiratory failure    SUBJECTIVE / OVERNIGHT EVENTS:  States that his breathing is improved after HD session yesterday.  No new complaints overnight.  No F/C, N/V, CP, Cough, lightheadedness, dizziness, abdominal pain, diarrhea, dysuria.    MEDICATIONS  (STANDING):  ALBUTerol/ipratropium for Nebulization 3 milliLiter(s) Nebulizer every 6 hours  amiodarone    Tablet 100 milliGRAM(s) Oral daily  aspirin enteric coated 81 milliGRAM(s) Oral daily  atorvastatin 80 milliGRAM(s) Oral at bedtime  buDESOnide   90 MICROgram(s) Inhaler 2 Puff(s) Inhalation two times a day  chlorhexidine 4% Liquid 1 Application(s) Topical daily  dextrose 5%. 1000 milliLiter(s) (50 mL/Hr) IV Continuous <Continuous>  dextrose 5%. 1000 milliLiter(s) (50 mL/Hr) IV Continuous <Continuous>  dextrose 50% Injectable 12.5 Gram(s) IV Push once  dextrose 50% Injectable 25 Gram(s) IV Push once  dextrose 50% Injectable 25 Gram(s) IV Push once  DOBUTamine Infusion 7.5 MICROgram(s)/kG/Min (16.942 mL/Hr) IV Continuous <Continuous>  docusate sodium 100 milliGRAM(s) Oral two times a day  finasteride 5 milliGRAM(s) Oral daily  influenza   Vaccine 0.5 milliLiter(s) IntraMuscular once  insulin glargine Injectable (LANTUS) 16 Unit(s) SubCutaneous at bedtime  insulin lispro (HumaLOG) corrective regimen sliding scale   SubCutaneous three times a day before meals  insulin lispro (HumaLOG) corrective regimen sliding scale   SubCutaneous at bedtime  levothyroxine 75 MICROGram(s) Oral daily  midodrine 30 milliGRAM(s) Oral three times a day  MIRACLE MOUTHWASH 30 milliLiter(s) Swish and Spit three times a day  Nephro-lynda 1 Tablet(s) Oral daily  polyethylene glycol 3350 17 Gram(s) Oral daily  predniSONE   Tablet 20 milliGRAM(s) Oral daily  senna 2 Tablet(s) Oral at bedtime    MEDICATIONS  (PRN):  benzocaine 15 mG/menthol 3.6 mG Lozenge 1 Lozenge Oral every 6 hours PRN Sore Throat  dextrose Gel 1 Dose(s) Oral once PRN Blood Glucose LESS THAN 70 milliGRAM(s)/deciLiter  dextrose Gel 1 Dose(s) Oral once PRN Blood Glucose LESS THAN 70 milliGRAM(s)/deciliter  glucagon  Injectable 1 milliGRAM(s) IntraMuscular once PRN Glucose <70 milliGRAM(s)/deciLiter  guaiFENesin    Syrup 100 milliGRAM(s) Oral every 6 hours PRN Cough      Vital Signs Last 24 Hrs  T(C): 36.6 (28 Feb 2018 06:45), Max: 36.6 (27 Feb 2018 16:15)  T(F): 97.8 (28 Feb 2018 06:45), Max: 97.9 (27 Feb 2018 16:15)  HR: 83 (28 Feb 2018 08:35) (74 - 89)  BP: 103/66 (28 Feb 2018 06:45) (101/64 - 120/54)  BP(mean): --  RR: 18 (28 Feb 2018 06:45) (18 - 20)  SpO2: 96% (28 Feb 2018 08:35) (92% - 97%)  CAPILLARY BLOOD GLUCOSE      POCT Blood Glucose.: 218 mg/dL (28 Feb 2018 09:16)  POCT Blood Glucose.: 178 mg/dL (27 Feb 2018 21:15)  POCT Blood Glucose.: 181 mg/dL (27 Feb 2018 19:09)  POCT Blood Glucose.: 368 mg/dL (27 Feb 2018 12:56)    I&O's Summary    27 Feb 2018 07:01  -  28 Feb 2018 07:00  --------------------------------------------------------  IN: 400 mL / OUT: 2400 mL / NET: -2000 mL        PHYSICAL EXAM:  GENERAL: NAD, frail appearing  HEAD:  Atraumatic, Normocephalic  EYES: EOMI, PERRLA, conjunctiva and sclera clear  NECK: Supple.  CHEST/LUNG: Diffuse crackles on inspiration but improved.  No wheeze.  HEART: Afib; No murmurs, rubs, or gallops  ABDOMEN: Soft, Nontender, Nondistended; Bowel sounds present  EXTREMITIES:  2+ Peripheral Pulses, No clubbing, cyanosis, or edema, LUE PICC in place.  PSYCH: Calm  NEUROLOGY: A/Ox3, non-focal  SKIN: No rashes or lesions    LABS:                        10.7   8.45  )-----------( 124      ( 28 Feb 2018 09:35 )             33.0     02-28    134<L>  |  95<L>  |  35<H>  ----------------------------<  235<H>  4.1   |  26  |  3.92<H>    Ca    8.6      28 Feb 2018 09:35  Phos  2.6     02-28  Mg     1.9     02-28    TPro  7.7  /  Alb  2.8<L>  /  TBili  0.5  /  DBili  0.3<H>  /  AST  73<H>  /  ALT  61<H>  /  AlkPhos  216<H>  02-27    PT/INR - ( 28 Feb 2018 09:35 )   PT: 22.9 SEC;   INR: 1.96                    RADIOLOGY & ADDITIONAL TESTS:    Imaging Personally Reviewed:    Care Discussed with Consultants/Other Providers:

## 2018-02-28 NOTE — PROGRESS NOTE ADULT - PROBLEM SELECTOR PLAN 3
: supportive treatment:  titrate oxygen to keep o2 sat>=90%  2/26 began trial prednisone for sob-prednisone 20 mg po daily  2/28: started on a trial of prednisone two days ago, will monitor, however per his family, he never had a good response to previous trial of steroid.

## 2018-02-28 NOTE — PROGRESS NOTE ADULT - PROBLEM SELECTOR PLAN 1
has underlying pulmonary fibrosis and respiratory failure precipitated by influenza:   Titrate fio2 to keep o2 sat >=90% remains on 50% VM  NIV QHS/PRV  2/28: cont NIV at night and daytime too

## 2018-02-28 NOTE — PROGRESS NOTE ADULT - PROBLEM SELECTOR PLAN 1
Patient initially admitted with acute on chronic hypoxic respiratory failure secondary to influenza and underlying COPD, pulmonary fibrosis. s/p course of Tamiflu and CCU stay for acute on chronic hypotension. F/U with Pulmonology - attempt to wean to O2 via NC - supplemental oxygen to maintain O2 sat >90%  -BiPAP hs - on prednisone 20mg daily. As per cardio, no indication for TTE with bubble for R to L shunt eval because patient is not candidate for possible PFO repair at this time.

## 2018-02-28 NOTE — PROGRESS NOTE ADULT - PROBLEM SELECTOR PLAN 6
FS glucose elevated, likely because of prednisone.   - increase lantus to 16u -c/w Lispro SS - continue HD support.

## 2018-02-28 NOTE — PROGRESS NOTE ADULT - SUBJECTIVE AND OBJECTIVE BOX
Pt seen and examined at bedside  No acute events overngiht. No new complaints.     Allergies:  No Known Allergies    Hospital Medications:   MEDICATIONS  (STANDING):  ALBUTerol/ipratropium for Nebulization 3 milliLiter(s) Nebulizer every 6 hours  amiodarone    Tablet 100 milliGRAM(s) Oral daily  aspirin enteric coated 81 milliGRAM(s) Oral daily  atorvastatin 80 milliGRAM(s) Oral at bedtime  buDESOnide   90 MICROgram(s) Inhaler 2 Puff(s) Inhalation two times a day  chlorhexidine 4% Liquid 1 Application(s) Topical daily  dextrose 5%. 1000 milliLiter(s) (50 mL/Hr) IV Continuous <Continuous>  dextrose 5%. 1000 milliLiter(s) (50 mL/Hr) IV Continuous <Continuous>  dextrose 50% Injectable 12.5 Gram(s) IV Push once  dextrose 50% Injectable 25 Gram(s) IV Push once  dextrose 50% Injectable 25 Gram(s) IV Push once  DOBUTamine Infusion 7.5 MICROgram(s)/kG/Min (16.942 mL/Hr) IV Continuous <Continuous>  docusate sodium 100 milliGRAM(s) Oral two times a day  finasteride 5 milliGRAM(s) Oral daily  influenza   Vaccine 0.5 milliLiter(s) IntraMuscular once  insulin glargine Injectable (LANTUS) 16 Unit(s) SubCutaneous at bedtime  insulin lispro (HumaLOG) corrective regimen sliding scale   SubCutaneous three times a day before meals  insulin lispro (HumaLOG) corrective regimen sliding scale   SubCutaneous at bedtime  levothyroxine 75 MICROGram(s) Oral daily  midodrine 30 milliGRAM(s) Oral three times a day  MIRACLE MOUTHWASH 30 milliLiter(s) Swish and Spit three times a day  Nephro-lynda 1 Tablet(s) Oral daily  polyethylene glycol 3350 17 Gram(s) Oral daily  predniSONE   Tablet 20 milliGRAM(s) Oral daily  senna 2 Tablet(s) Oral at bedtime    VITALS:  T(F): 97.8 (02-28-18 @ 06:45), Max: 97.9 (02-27-18 @ 16:15)  HR: 83 (02-28-18 @ 08:35)  BP: 103/66 (02-28-18 @ 06:45)  RR: 18 (02-28-18 @ 06:45)  SpO2: 96% (02-28-18 @ 08:35)  Wt(kg): --    02-27 @ 07:01  -  02-28 @ 07:00  --------------------------------------------------------  IN: 400 mL / OUT: 2400 mL / NET: -2000 mL      PHYSICAL EXAM:  Constitutional: NAD  HEENT: anicteric sclera, oropharynx clear, MMM  Neck: No JVD  Respiratory: Fine bilateral crackles.   Cardiovascular: S1, S2, RRR  Gastrointestinal: BS+, soft, NT/ND  Extremities: No cyanosis or clubbing. 1+ peripheral edema  Neurological: A/O x 3, no focal deficits  Psychiatric: Normal mood, normal affect  : No CVA tenderness. No rosa.   Skin: No rashes  Vascular Access: RIJ tunneled cath     LABS:  02-28    134<L>  |  95<L>  |  35<H>  ----------------------------<  235<H>  4.1   |  26  |  3.92<H>    Ca    8.6      28 Feb 2018 09:35  Phos  2.6     02-28  Mg     1.9     02-28    TPro  7.7  /  Alb  2.8<L>  /  TBili  0.5  /  DBili  0.3<H>  /  AST  73<H>  /  ALT  61<H>  /  AlkPhos  216<H>  02-27    Creatinine Trend: 3.92 <--, 5.08 <--, 7.29 <--, 5.93 <--, 4.87 <--, 6.47 <--, 5.48 <--                        10.7   8.45  )-----------( 124      ( 28 Feb 2018 09:35 )             33.0     Urine Studies:      RADIOLOGY & ADDITIONAL STUDIES:

## 2018-03-01 DIAGNOSIS — N18.6 END STAGE RENAL DISEASE: ICD-10-CM

## 2018-03-01 DIAGNOSIS — R53.81 OTHER MALAISE: ICD-10-CM

## 2018-03-01 DIAGNOSIS — R06.00 DYSPNEA, UNSPECIFIED: ICD-10-CM

## 2018-03-01 DIAGNOSIS — R53.83 OTHER FATIGUE: ICD-10-CM

## 2018-03-01 DIAGNOSIS — I50.9 HEART FAILURE, UNSPECIFIED: ICD-10-CM

## 2018-03-01 DIAGNOSIS — R68.2 DRY MOUTH, UNSPECIFIED: ICD-10-CM

## 2018-03-01 DIAGNOSIS — Z51.5 ENCOUNTER FOR PALLIATIVE CARE: ICD-10-CM

## 2018-03-01 LAB
ALBUMIN SERPL ELPH-MCNC: 2.9 G/DL — LOW (ref 3.3–5)
ALP SERPL-CCNC: 245 U/L — HIGH (ref 40–120)
ALT FLD-CCNC: 84 U/L — HIGH (ref 4–41)
APTT BLD: 44.2 SEC — HIGH (ref 27.5–37.4)
AST SERPL-CCNC: 86 U/L — HIGH (ref 4–40)
BILIRUB SERPL-MCNC: 0.5 MG/DL — SIGNIFICANT CHANGE UP (ref 0.2–1.2)
BUN SERPL-MCNC: 51 MG/DL — HIGH (ref 7–23)
CALCIUM SERPL-MCNC: 8.7 MG/DL — SIGNIFICANT CHANGE UP (ref 8.4–10.5)
CHLORIDE SERPL-SCNC: 92 MMOL/L — LOW (ref 98–107)
CO2 SERPL-SCNC: 25 MMOL/L — SIGNIFICANT CHANGE UP (ref 22–31)
CREAT SERPL-MCNC: 5.17 MG/DL — HIGH (ref 0.5–1.3)
GLUCOSE SERPL-MCNC: 299 MG/DL — HIGH (ref 70–99)
HCT VFR BLD CALC: 33.2 % — LOW (ref 39–50)
HGB BLD-MCNC: 10.6 G/DL — LOW (ref 13–17)
INR BLD: 2 — HIGH (ref 0.88–1.17)
MCHC RBC-ENTMCNC: 30.4 PG — SIGNIFICANT CHANGE UP (ref 27–34)
MCHC RBC-ENTMCNC: 31.9 % — LOW (ref 32–36)
MCV RBC AUTO: 95.1 FL — SIGNIFICANT CHANGE UP (ref 80–100)
NRBC # FLD: 0 — SIGNIFICANT CHANGE UP
PLATELET # BLD AUTO: 121 K/UL — LOW (ref 150–400)
PMV BLD: 13.1 FL — HIGH (ref 7–13)
POTASSIUM SERPL-MCNC: 3.8 MMOL/L — SIGNIFICANT CHANGE UP (ref 3.5–5.3)
POTASSIUM SERPL-SCNC: 3.8 MMOL/L — SIGNIFICANT CHANGE UP (ref 3.5–5.3)
PROT SERPL-MCNC: 7.7 G/DL — SIGNIFICANT CHANGE UP (ref 6–8.3)
PROTHROM AB SERPL-ACNC: 23.3 SEC — HIGH (ref 9.8–13.1)
RBC # BLD: 3.49 M/UL — LOW (ref 4.2–5.8)
RBC # FLD: 17.2 % — HIGH (ref 10.3–14.5)
SODIUM SERPL-SCNC: 130 MMOL/L — LOW (ref 135–145)
WBC # BLD: 8.45 K/UL — SIGNIFICANT CHANGE UP (ref 3.8–10.5)
WBC # FLD AUTO: 8.45 K/UL — SIGNIFICANT CHANGE UP (ref 3.8–10.5)

## 2018-03-01 PROCEDURE — 99223 1ST HOSP IP/OBS HIGH 75: CPT

## 2018-03-01 PROCEDURE — 99233 SBSQ HOSP IP/OBS HIGH 50: CPT

## 2018-03-01 RX ORDER — WARFARIN SODIUM 2.5 MG/1
3 TABLET ORAL ONCE
Qty: 0 | Refills: 0 | Status: COMPLETED | OUTPATIENT
Start: 2018-03-01 | End: 2018-03-01

## 2018-03-01 RX ORDER — INSULIN LISPRO 100/ML
2 VIAL (ML) SUBCUTANEOUS
Qty: 0 | Refills: 0 | Status: DISCONTINUED | OUTPATIENT
Start: 2018-03-01 | End: 2018-03-02

## 2018-03-01 RX ADMIN — DIPHENHYDRAMINE HYDROCHLORIDE AND LIDOCAINE HYDROCHLORIDE AND ALUMINUM HYDROXIDE AND MAGNESIUM HYDRO 30 MILLILITER(S): KIT at 15:11

## 2018-03-01 RX ADMIN — DIPHENHYDRAMINE HYDROCHLORIDE AND LIDOCAINE HYDROCHLORIDE AND ALUMINUM HYDROXIDE AND MAGNESIUM HYDRO 30 MILLILITER(S): KIT at 22:55

## 2018-03-01 RX ADMIN — Medication 100 MILLIGRAM(S): at 13:07

## 2018-03-01 RX ADMIN — DIPHENHYDRAMINE HYDROCHLORIDE AND LIDOCAINE HYDROCHLORIDE AND ALUMINUM HYDROXIDE AND MAGNESIUM HYDRO 30 MILLILITER(S): KIT at 05:31

## 2018-03-01 RX ADMIN — Medication 81 MILLIGRAM(S): at 13:05

## 2018-03-01 RX ADMIN — CHLORHEXIDINE GLUCONATE 1 APPLICATION(S): 213 SOLUTION TOPICAL at 13:22

## 2018-03-01 RX ADMIN — SENNA PLUS 2 TABLET(S): 8.6 TABLET ORAL at 22:55

## 2018-03-01 RX ADMIN — Medication 2 PUFF(S): at 09:23

## 2018-03-01 RX ADMIN — Medication 3 MILLILITER(S): at 21:00

## 2018-03-01 RX ADMIN — MIDODRINE HYDROCHLORIDE 30 MILLIGRAM(S): 2.5 TABLET ORAL at 15:10

## 2018-03-01 RX ADMIN — Medication 3 MILLILITER(S): at 10:42

## 2018-03-01 RX ADMIN — Medication 2 UNIT(S): at 13:20

## 2018-03-01 RX ADMIN — INSULIN GLARGINE 16 UNIT(S): 100 INJECTION, SOLUTION SUBCUTANEOUS at 22:54

## 2018-03-01 RX ADMIN — AMIODARONE HYDROCHLORIDE 100 MILLIGRAM(S): 400 TABLET ORAL at 05:29

## 2018-03-01 RX ADMIN — Medication 20 MILLIGRAM(S): at 05:29

## 2018-03-01 RX ADMIN — WARFARIN SODIUM 3 MILLIGRAM(S): 2.5 TABLET ORAL at 22:53

## 2018-03-01 RX ADMIN — Medication 16.94 MICROGRAM(S)/KG/MIN: at 13:19

## 2018-03-01 RX ADMIN — FINASTERIDE 5 MILLIGRAM(S): 5 TABLET, FILM COATED ORAL at 13:05

## 2018-03-01 RX ADMIN — MIDODRINE HYDROCHLORIDE 30 MILLIGRAM(S): 2.5 TABLET ORAL at 05:31

## 2018-03-01 RX ADMIN — Medication 75 MICROGRAM(S): at 05:30

## 2018-03-01 RX ADMIN — MIDODRINE HYDROCHLORIDE 30 MILLIGRAM(S): 2.5 TABLET ORAL at 22:54

## 2018-03-01 RX ADMIN — Medication 1 TABLET(S): at 13:05

## 2018-03-01 RX ADMIN — Medication 2 PUFF(S): at 22:56

## 2018-03-01 RX ADMIN — Medication 3 MILLILITER(S): at 04:02

## 2018-03-01 RX ADMIN — Medication 3: at 09:22

## 2018-03-01 RX ADMIN — Medication 100 MILLIGRAM(S): at 22:52

## 2018-03-01 RX ADMIN — ATORVASTATIN CALCIUM 80 MILLIGRAM(S): 80 TABLET, FILM COATED ORAL at 22:53

## 2018-03-01 RX ADMIN — Medication 5: at 13:20

## 2018-03-01 NOTE — PROGRESS NOTE ADULT - PROBLEM SELECTOR PLAN 4
FS glucose elevated, likely because of prednisone.   - lantus to 16u - added pre-meal insulin - continue HD support.

## 2018-03-01 NOTE — PROGRESS NOTE ADULT - SUBJECTIVE AND OBJECTIVE BOX
Pt seen and examined at bedside  No new complaints. SOB at baseline. Pt sitting up  eating lunch.     Allergies:  No Known Allergies    Hospital Medications:   MEDICATIONS  (STANDING):  ALBUTerol/ipratropium for Nebulization 3 milliLiter(s) Nebulizer every 6 hours  amiodarone    Tablet 100 milliGRAM(s) Oral daily  aspirin enteric coated 81 milliGRAM(s) Oral daily  atorvastatin 80 milliGRAM(s) Oral at bedtime  buDESOnide   90 MICROgram(s) Inhaler 2 Puff(s) Inhalation two times a day  chlorhexidine 4% Liquid 1 Application(s) Topical daily  dextrose 5%. 1000 milliLiter(s) (50 mL/Hr) IV Continuous <Continuous>  dextrose 5%. 1000 milliLiter(s) (50 mL/Hr) IV Continuous <Continuous>  dextrose 50% Injectable 12.5 Gram(s) IV Push once  dextrose 50% Injectable 25 Gram(s) IV Push once  dextrose 50% Injectable 25 Gram(s) IV Push once  DOBUTamine Infusion 7.5 MICROgram(s)/kG/Min (16.942 mL/Hr) IV Continuous <Continuous>  docusate sodium 100 milliGRAM(s) Oral two times a day  finasteride 5 milliGRAM(s) Oral daily  influenza   Vaccine 0.5 milliLiter(s) IntraMuscular once  insulin glargine Injectable (LANTUS) 16 Unit(s) SubCutaneous at bedtime  insulin lispro (HumaLOG) corrective regimen sliding scale   SubCutaneous three times a day before meals  insulin lispro (HumaLOG) corrective regimen sliding scale   SubCutaneous at bedtime  insulin lispro Injectable (HumaLOG) 2 Unit(s) SubCutaneous three times a day before meals  levothyroxine 75 MICROGram(s) Oral daily  midodrine 30 milliGRAM(s) Oral three times a day  MIRACLE MOUTHWASH 30 milliLiter(s) Swish and Spit three times a day  Nephro-lynda 1 Tablet(s) Oral daily  polyethylene glycol 3350 17 Gram(s) Oral daily  predniSONE   Tablet 20 milliGRAM(s) Oral daily  senna 2 Tablet(s) Oral at bedtime  warfarin 3 milliGRAM(s) Oral once    VITALS:  T(F): 97.3 (03-01-18 @ 12:59), Max: 97.6 (03-01-18 @ 00:40)  HR: 83 (03-01-18 @ 12:59)  BP: 122/66 (03-01-18 @ 12:59)  RR: 16 (03-01-18 @ 12:59)  SpO2: 100% (03-01-18 @ 12:59)  Wt(kg): --    02-28 @ 07:01  -  03-01 @ 07:00  --------------------------------------------------------  IN: 600 mL / OUT: 0 mL / NET: 600 mL    PHYSICAL EXAM:  Constitutional: NAD  HEENT: anicteric sclera, oropharynx clear, MMM  Neck: No JVD  Respiratory: Fine bilateral crackles.   Cardiovascular: S1, S2, RRR  Gastrointestinal: BS+, soft, NT/ND  Extremities: No cyanosis or clubbing. 2+ peripheral edema  Neurological: A/O x 3, no focal deficits  Psychiatric: Normal mood, normal affect  : No CVA tenderness. No rosa.   Skin: No rashes  Vascular Access: RIJ tunneled cath     LABS:  03-01    130<L>  |  92<L>  |  51<H>  ----------------------------<  299<H>  3.8   |  25  |  5.17<H>    Ca    8.7      01 Mar 2018 05:55  Phos  2.6     02-28  Mg     1.9     02-28    TPro  7.7  /  Alb  2.9<L>  /  TBili  0.5  /  DBili      /  AST  86<H>  /  ALT  84<H>  /  AlkPhos  245<H>  03-01    Creatinine Trend: 5.17 <--, 3.92 <--, 5.08 <--, 7.29 <--, 5.93 <--, 4.87 <--, 6.47 <--                        10.6   8.45  )-----------( 121      ( 01 Mar 2018 05:55 )             33.2     Urine Studies:      RADIOLOGY & ADDITIONAL STUDIES:

## 2018-03-01 NOTE — PROGRESS NOTE ADULT - PROBLEM SELECTOR PLAN 3
: supportive treatment:  titrate oxygen to keep o2 sat>=90%  2/26 began trial prednisone for sob-prednisone 20 mg po daily  2/28: started on a trial of prednisone two days ago, will monitor, however per his family, he never had a good response to previous trial of steroid.  3/1: on retrial of steroids: Pt thinks his breathing is better then before: Would like to cont steroids at this t alirio

## 2018-03-01 NOTE — PROGRESS NOTE ADULT - PROBLEM SELECTOR PLAN 1
due to influenza and underlying COPD, pulmonary fibrosis. s/p course of Tamiflu and CCU. Appreciate Pulmonology input - supplemental oxygen to maintain O2 sat >90% on PO steroids.  -BiPAP hs - on prednisone 20mg daily. As per cardio, no indication for TTE with bubble for R to L shunt eval because patient is not candidate for possible PFO repair at this time.

## 2018-03-01 NOTE — PROGRESS NOTE ADULT - PROBLEM SELECTOR PLAN 1
has underlying pulmonary fibrosis and respiratory failure precipitated by influenza:   Titrate fio2 to keep o2 sat >=90% remains on 50% VM  NIV QHS/PRV  2/28: cont NIV at night and daytime too  3/1: says his breathing is better: would cont to use steroids as well as bipap at night time: Cont oxygen  to keep sao2 above 88%:

## 2018-03-01 NOTE — PROGRESS NOTE ADULT - PROBLEM SELECTOR PLAN 1
HD 2/27, with 2Kg achieved.   K acceptable. Plan for repeat HD today  Continue midodrine.   c/w renal diet, add fluid restriction 1.2L/day, reinforced w/pt

## 2018-03-01 NOTE — PROGRESS NOTE ADULT - PROBLEM SELECTOR PLAN 4
titrate oxygen to keep o2 sat>=90%  c/w suman and pulmicort  2/26 began prednisone 20mg po daily  2/28: cont steroids to see how he responds to it in next few days  3/1: cont steroids as well as BD

## 2018-03-01 NOTE — CONSULT NOTE ADULT - PROBLEM SELECTOR RECOMMENDATION 3
Superimposed pulmonary fibrosis and COPD  On corticosteroids  Erick of 88% on supplemental oxygen on 2/28 Superimposed pulmonary fibrosis and COPD  On corticosteroids  Erick of 88% on supplemental oxygen on 2/28  Sa02 adequate at this time

## 2018-03-01 NOTE — PROGRESS NOTE ADULT - PROBLEM SELECTOR PLAN 7
monitor blood glucose: defer to primary care:  3/1: blood glucose is  uncontrolled: ? secondary to steroids: would defer to endo

## 2018-03-01 NOTE — PROGRESS NOTE ADULT - SUBJECTIVE AND OBJECTIVE BOX
Patient is a 62y old  Male who presents with a chief complaint of SOB x few hours (20 Feb 2018 18:05)      Any change in ROS: pt is doing ok     says his breathing is better:    he was started on steroids few days back as a trial for h is SOB, and feels like, his breathing has improved:     MEDICATIONS  (STANDING):  ALBUTerol/ipratropium for Nebulization 3 milliLiter(s) Nebulizer every 6 hours  amiodarone    Tablet 100 milliGRAM(s) Oral daily  aspirin enteric coated 81 milliGRAM(s) Oral daily  atorvastatin 80 milliGRAM(s) Oral at bedtime  buDESOnide   90 MICROgram(s) Inhaler 2 Puff(s) Inhalation two times a day  chlorhexidine 4% Liquid 1 Application(s) Topical daily  dextrose 5%. 1000 milliLiter(s) (50 mL/Hr) IV Continuous <Continuous>  dextrose 5%. 1000 milliLiter(s) (50 mL/Hr) IV Continuous <Continuous>  dextrose 50% Injectable 12.5 Gram(s) IV Push once  dextrose 50% Injectable 25 Gram(s) IV Push once  dextrose 50% Injectable 25 Gram(s) IV Push once  DOBUTamine Infusion 7.5 MICROgram(s)/kG/Min (16.942 mL/Hr) IV Continuous <Continuous>  docusate sodium 100 milliGRAM(s) Oral two times a day  finasteride 5 milliGRAM(s) Oral daily  influenza   Vaccine 0.5 milliLiter(s) IntraMuscular once  insulin glargine Injectable (LANTUS) 16 Unit(s) SubCutaneous at bedtime  insulin lispro (HumaLOG) corrective regimen sliding scale   SubCutaneous three times a day before meals  insulin lispro (HumaLOG) corrective regimen sliding scale   SubCutaneous at bedtime  insulin lispro Injectable (HumaLOG) 2 Unit(s) SubCutaneous three times a day before meals  levothyroxine 75 MICROGram(s) Oral daily  midodrine 30 milliGRAM(s) Oral three times a day  MIRACLE MOUTHWASH 30 milliLiter(s) Swish and Spit three times a day  Nephro-lynda 1 Tablet(s) Oral daily  polyethylene glycol 3350 17 Gram(s) Oral daily  predniSONE   Tablet 20 milliGRAM(s) Oral daily  senna 2 Tablet(s) Oral at bedtime  warfarin 3 milliGRAM(s) Oral once    MEDICATIONS  (PRN):  benzocaine 15 mG/menthol 3.6 mG Lozenge 1 Lozenge Oral every 6 hours PRN Sore Throat  dextrose Gel 1 Dose(s) Oral once PRN Blood Glucose LESS THAN 70 milliGRAM(s)/deciLiter  dextrose Gel 1 Dose(s) Oral once PRN Blood Glucose LESS THAN 70 milliGRAM(s)/deciliter  glucagon  Injectable 1 milliGRAM(s) IntraMuscular once PRN Glucose <70 milliGRAM(s)/deciLiter  guaiFENesin    Syrup 100 milliGRAM(s) Oral every 6 hours PRN Cough    Vital Signs Last 24 Hrs  T(C): 36.1 (01 Mar 2018 04:40), Max: 36.4 (01 Mar 2018 00:40)  T(F): 97 (01 Mar 2018 04:40), Max: 97.6 (01 Mar 2018 00:40)  HR: 84 (01 Mar 2018 04:40) (77 - 97)  BP: 106/60 (01 Mar 2018 04:40) (104/65 - 124/74)  BP(mean): --  RR: 17 (01 Mar 2018 04:40) (17 - 18)  SpO2: 97% (01 Mar 2018 04:40) (88% - 99%)    I&O's Summary    28 Feb 2018 07:01  -  01 Mar 2018 07:00  --------------------------------------------------------  IN: 600 mL / OUT: 0 mL / NET: 600 mL          Physical Exam:   GENERAL: NAD, well-groomed, well-developed  HEENT: BELL/   Atraumatic, Normocephalic  ENMT: No tonsillar erythema, exudates, or enlargement; Moist mucous membranes, Good dentition, No lesions  NECK: Supple, No JVD, Normal thyroid  CHEST/LUNG: scatttered crackles bilaterally  CVS: Regular rate and rhythm; No murmurs, rubs, or gallops  GI: : Soft, Nontender, Nondistended; Bowel sounds present  NERVOUS SYSTEM:  Alert & Oriented X3  EXTREMIT+ edema  LYMPH: No lymphadenopathy noted  SKIN: No rashes or lesions  ENDOCRINOLOGY: No Thyromegaly  PSYCH: Appropriate    Labs:                              10.6   8.45  )-----------( 121      ( 01 Mar 2018 05:55 )             33.2                         10.7   8.45  )-----------( 124      ( 28 Feb 2018 09:35 )             33.0                         11.0   7.51  )-----------( 138      ( 27 Feb 2018 06:30 )             35.1                         10.0   7.22  )-----------( 119      ( 26 Feb 2018 06:05 )             31.8     03-01    130<L>  |  92<L>  |  51<H>  ----------------------------<  299<H>  3.8   |  25  |  5.17<H>  02-28    134<L>  |  95<L>  |  35<H>  ----------------------------<  235<H>  4.1   |  26  |  3.92<H>  02-27    133<L>  |  94<L>  |  36<H>  ----------------------------<  117<H>  3.4<L>   |  23  |  5.08<H>  02-26    128<L>  |  88<L>  |  52<H>  ----------------------------<  324<H>  4.3   |  22  |  7.29<H>    Ca    8.7      01 Mar 2018 05:55  Ca    8.6      28 Feb 2018 09:35  Phos  2.6     02-28  Mg     1.9     02-28    TPro  7.7  /  Alb  2.9<L>  /  TBili  0.5  /  DBili  x   /  AST  86<H>  /  ALT  84<H>  /  AlkPhos  245<H>  03-01  TPro  7.7  /  Alb  2.8<L>  /  TBili  0.5  /  DBili  0.3<H>  /  AST  73<H>  /  ALT  61<H>  /  AlkPhos  216<H>  02-27    CAPILLARY BLOOD GLUCOSE      POCT Blood Glucose.: 271 mg/dL (01 Mar 2018 08:40)  POCT Blood Glucose.: 348 mg/dL (28 Feb 2018 22:57)  POCT Blood Glucose.: 466 mg/dL (28 Feb 2018 17:31)  POCT Blood Glucose.: 344 mg/dL (28 Feb 2018 13:04)      LIVER FUNCTIONS - ( 01 Mar 2018 05:55 )  Alb: 2.9 g/dL / Pro: 7.7 g/dL / ALK PHOS: 245 u/L / ALT: 84 u/L / AST: 86 u/L / GGT: x           PT/INR - ( 01 Mar 2018 05:55 )   PT: 23.3 SEC;   INR: 2.00          PTT - ( 01 Mar 2018 05:55 )  PTT:44.2 SEC    Cultures:               < from: Xray Chest 1 View- PORTABLE-Urgent (02.26.18 @ 11:27) >  COMPARISON: Chest radiograph performed 2/17/2018. CT abdomen and pelvis   performed 10/30/2017.    FINDINGS:    A right-sided dialysis catheter terminates over SVC. There is a   left-sided cardiac device. There is redemonstration of diffuse   interstitial and reticular opacities bilaterally, mildly improved since   2/17/2018. There are small bilateral pleural effusions.    IMPRESSION:    Persistent diffuse interstitial and reticular lung opacities bilaterally,   mildly improved since 2/17/2018, which may represent pulmonary edema   superimposed on patient's known interstitial lung disease.    Small bilateral pleural effusions.              ANNIA MURPHY M.D., RADIOLOGY RESIDENT  This document has been electronically signed.  NITIN MAHARAJ M.D. ATTENDING RADIOLOGIST  This document has been electronically signed. Feb 26 2018  2:54PM        < end of copied text >                Studies  Chest X-RAY  CT SCAN Chest   Venous Dopplers: LE:   CT Abdomen  Others

## 2018-03-01 NOTE — CONSULT NOTE ADULT - PROBLEM SELECTOR RECOMMENDATION 2
Obtaining HD and tolerating without incident  Chronic hypotension  Midodrine Obtaining HD and tolerating without incident most days as an outpatient  Chronic hypotension  Midodrine being used currently

## 2018-03-01 NOTE — PROGRESS NOTE ADULT - SUBJECTIVE AND OBJECTIVE BOX
CC: F/U fore respiratory failure    SUBJECTIVE / OVERNIGHT EVENTS:  Offers no new complaints.  Question about whether his HD days will be changed permanently.  No F/C, N/V, CP, Cough, lightheadedness, dizziness, abdominal pain, diarrhea, dysuria.    MEDICATIONS  (STANDING):  ALBUTerol/ipratropium for Nebulization 3 milliLiter(s) Nebulizer every 6 hours  amiodarone    Tablet 100 milliGRAM(s) Oral daily  aspirin enteric coated 81 milliGRAM(s) Oral daily  atorvastatin 80 milliGRAM(s) Oral at bedtime  buDESOnide   90 MICROgram(s) Inhaler 2 Puff(s) Inhalation two times a day  chlorhexidine 4% Liquid 1 Application(s) Topical daily  dextrose 5%. 1000 milliLiter(s) (50 mL/Hr) IV Continuous <Continuous>  dextrose 5%. 1000 milliLiter(s) (50 mL/Hr) IV Continuous <Continuous>  dextrose 50% Injectable 12.5 Gram(s) IV Push once  dextrose 50% Injectable 25 Gram(s) IV Push once  dextrose 50% Injectable 25 Gram(s) IV Push once  DOBUTamine Infusion 7.5 MICROgram(s)/kG/Min (16.942 mL/Hr) IV Continuous <Continuous>  docusate sodium 100 milliGRAM(s) Oral two times a day  finasteride 5 milliGRAM(s) Oral daily  influenza   Vaccine 0.5 milliLiter(s) IntraMuscular once  insulin glargine Injectable (LANTUS) 16 Unit(s) SubCutaneous at bedtime  insulin lispro (HumaLOG) corrective regimen sliding scale   SubCutaneous three times a day before meals  insulin lispro (HumaLOG) corrective regimen sliding scale   SubCutaneous at bedtime  insulin lispro Injectable (HumaLOG) 2 Unit(s) SubCutaneous three times a day before meals  levothyroxine 75 MICROGram(s) Oral daily  midodrine 30 milliGRAM(s) Oral three times a day  MIRACLE MOUTHWASH 30 milliLiter(s) Swish and Spit three times a day  Nephro-lynda 1 Tablet(s) Oral daily  polyethylene glycol 3350 17 Gram(s) Oral daily  predniSONE   Tablet 20 milliGRAM(s) Oral daily  senna 2 Tablet(s) Oral at bedtime  warfarin 3 milliGRAM(s) Oral once    MEDICATIONS  (PRN):  benzocaine 15 mG/menthol 3.6 mG Lozenge 1 Lozenge Oral every 6 hours PRN Sore Throat  dextrose Gel 1 Dose(s) Oral once PRN Blood Glucose LESS THAN 70 milliGRAM(s)/deciLiter  dextrose Gel 1 Dose(s) Oral once PRN Blood Glucose LESS THAN 70 milliGRAM(s)/deciliter  glucagon  Injectable 1 milliGRAM(s) IntraMuscular once PRN Glucose <70 milliGRAM(s)/deciLiter  guaiFENesin    Syrup 100 milliGRAM(s) Oral every 6 hours PRN Cough      Vital Signs Last 24 Hrs  T(C): 36.1 (01 Mar 2018 04:40), Max: 36.4 (01 Mar 2018 00:40)  T(F): 97 (01 Mar 2018 04:40), Max: 97.6 (01 Mar 2018 00:40)  HR: 84 (01 Mar 2018 04:40) (82 - 97)  BP: 106/60 (01 Mar 2018 04:40) (104/65 - 124/74)  BP(mean): --  RR: 17 (01 Mar 2018 04:40) (17 - 18)  SpO2: 97% (01 Mar 2018 04:40) (88% - 99%)  CAPILLARY BLOOD GLUCOSE      POCT Blood Glucose.: 271 mg/dL (01 Mar 2018 08:40)  POCT Blood Glucose.: 348 mg/dL (28 Feb 2018 22:57)  POCT Blood Glucose.: 466 mg/dL (28 Feb 2018 17:31)  POCT Blood Glucose.: 344 mg/dL (28 Feb 2018 13:04)    I&O's Summary    28 Feb 2018 07:01  -  01 Mar 2018 07:00  --------------------------------------------------------  IN: 600 mL / OUT: 0 mL / NET: 600 mL        PHYSICAL EXAM:  GENERAL: NAD, frail appearing  HEAD:  Atraumatic, Normocephalic  EYES: EOMI, PERRLA, conjunctiva and sclera clear  NECK: Supple.  CHEST/LUNG: Diffuse crackles on inspiration.  No wheeze.  HEART: Afib; No murmurs, rubs, or gallops  ABDOMEN: Soft, Nontender, Nondistended; Bowel sounds present  EXTREMITIES:  2+ Peripheral Pulses, No clubbing, cyanosis, or edema, LUE PICC in place.  PSYCH: Calm  NEUROLOGY: A/Ox3, non-focal  SKIN: No rashes or lesions    LABS:                        10.6   8.45  )-----------( 121      ( 01 Mar 2018 05:55 )             33.2     03-01    130<L>  |  92<L>  |  51<H>  ----------------------------<  299<H>  3.8   |  25  |  5.17<H>    Ca    8.7      01 Mar 2018 05:55  Phos  2.6     02-28  Mg     1.9     02-28    TPro  7.7  /  Alb  2.9<L>  /  TBili  0.5  /  DBili  x   /  AST  86<H>  /  ALT  84<H>  /  AlkPhos  245<H>  03-01    PT/INR - ( 01 Mar 2018 05:55 )   PT: 23.3 SEC;   INR: 2.00          PTT - ( 01 Mar 2018 05:55 )  PTT:44.2 SEC          RADIOLOGY & ADDITIONAL TESTS:    Imaging Personally Reviewed:    Care Discussed with Consultants/Other Providers:

## 2018-03-01 NOTE — CONSULT NOTE ADULT - SUBJECTIVE AND OBJECTIVE BOX
HPI:  Patient is a 62 year old man with ESRD (HD MWF), NICM (EF 21%) s/p ICD, PICC line in place with home dobutamine drip, atrial fibrillation on coumadin, COPD on home O2 (4-5L), pulmonary fibrosis, hypotension on midodrine with recent admission 1/23-1/31 for diarrhea found to be positive for human meta pneumovirus who now returns to [facility] ER complaining of shortness of breath that started earlier this evening. He describes the sensation as his "oxygen not being enough him." He denies sick contacts or travel since last admission. He denies fever, chills, cough, chest pain, wheezing, abdominal pain, vomiting. He endorses LE edema worse than usual as well as one episode of non-bloody diarrhea. Patient states he tried his inhaler with no relief of SOB. He completed HD yesterday and had removal of 3.5L. He denies dietary indiscretion. Patient currently is on BiPAP and feels comfortable using the mask. Pt does not make urine.     In ER: T 98.4, HR 83, BP 91/52, RR 14, SpO2 98% on 40% FiO2 on BiPAP 12/5. Patient received dobutamine gtts at home dose in ER. RVP+ for influenza B infection. (13 Feb 2018 22:11)        dyspnea  CP      PERTINENT PMH REVIEWED:  [x ] YES [ ] NO           SOCIAL HISTORY:  Significant other/partner:  [ ] YES  [ ] NO            Children:  [ x] YES  [ ] NO                   Quaker/Spirituality:  Substance hx:  [ ] YES   [ ] NO           Tobacco hx:  [ ] YES  [ ] NO             Alcohol hx: [ ] YES  [ ] NO        Home Opioid hx:  [ ] YES  [ ] NO   Living Situation: [ ] Home  [ ] Long term care  [ ] Rehab    REFERRALS:   [ ] Chaplaincy  [ ] Hospice  [ ] Child Life  [ ] Social Work  [ ] Case management [ ] Holistic Therapy     FAMILY HISTORY:  No pertinent family history in first degree relatives    [ ] Family history non contributory     BASELINE ADLs (prior to admission):  Independent [ ] moderately [ ] fully   Dependent   [ ] moderately [ ] fully    ADVANCE DIRECTIVES:  [ ] YES [ ] NO   DNR [ ] YES [ ] NO                      MOLST  [ ] YES [ ] NO    Living Will  [ ] YES [ ] NO    Health Care Proxy [ ] YES  [ ] NO      [ ] Surrogate  [ ] HCP  [ ] Guardian:                                                                  Phone#:    Allergies    No Known Allergies    Intolerances        MEDICATIONS  (STANDING):  ALBUTerol/ipratropium for Nebulization 3 milliLiter(s) Nebulizer every 6 hours  amiodarone    Tablet 100 milliGRAM(s) Oral daily  aspirin enteric coated 81 milliGRAM(s) Oral daily  atorvastatin 80 milliGRAM(s) Oral at bedtime  buDESOnide   90 MICROgram(s) Inhaler 2 Puff(s) Inhalation two times a day  chlorhexidine 4% Liquid 1 Application(s) Topical daily  dextrose 5%. 1000 milliLiter(s) (50 mL/Hr) IV Continuous <Continuous>  dextrose 5%. 1000 milliLiter(s) (50 mL/Hr) IV Continuous <Continuous>  dextrose 50% Injectable 12.5 Gram(s) IV Push once  dextrose 50% Injectable 25 Gram(s) IV Push once  dextrose 50% Injectable 25 Gram(s) IV Push once  DOBUTamine Infusion 7.5 MICROgram(s)/kG/Min (16.942 mL/Hr) IV Continuous <Continuous>  docusate sodium 100 milliGRAM(s) Oral two times a day  finasteride 5 milliGRAM(s) Oral daily  influenza   Vaccine 0.5 milliLiter(s) IntraMuscular once  insulin glargine Injectable (LANTUS) 16 Unit(s) SubCutaneous at bedtime  insulin lispro (HumaLOG) corrective regimen sliding scale   SubCutaneous three times a day before meals  insulin lispro (HumaLOG) corrective regimen sliding scale   SubCutaneous at bedtime  insulin lispro Injectable (HumaLOG) 2 Unit(s) SubCutaneous three times a day before meals  levothyroxine 75 MICROGram(s) Oral daily  midodrine 30 milliGRAM(s) Oral three times a day  MIRACLE MOUTHWASH 30 milliLiter(s) Swish and Spit three times a day  Nephro-lynda 1 Tablet(s) Oral daily  polyethylene glycol 3350 17 Gram(s) Oral daily  predniSONE   Tablet 20 milliGRAM(s) Oral daily  senna 2 Tablet(s) Oral at bedtime  warfarin 3 milliGRAM(s) Oral once    MEDICATIONS  (PRN):  benzocaine 15 mG/menthol 3.6 mG Lozenge 1 Lozenge Oral every 6 hours PRN Sore Throat  dextrose Gel 1 Dose(s) Oral once PRN Blood Glucose LESS THAN 70 milliGRAM(s)/deciLiter  dextrose Gel 1 Dose(s) Oral once PRN Blood Glucose LESS THAN 70 milliGRAM(s)/deciliter  glucagon  Injectable 1 milliGRAM(s) IntraMuscular once PRN Glucose <70 milliGRAM(s)/deciLiter  guaiFENesin    Syrup 100 milliGRAM(s) Oral every 6 hours PRN Cough      PRESENT SYMPTOMS:  Source: [ ] Patient   [ ] Family   [ ] Team     Pain: [ ] YES [ ] NO  OLDCARTS:     Dyspnea: [ ] YES [ ] NO   Anxiety: [ ] YES [ ] NO  Fatigue: [ ] YES [ ] NO   Nausea: [ ] YES [ ] NO  Loss of appetite: [ ] YES [ ] NO   Constipation: [ ] YES [ ] NO     Other Symptoms:  [ ] All other review of systems negative   [ ] Unable to obtain due to poor mentation     Karnofsky Performance Score/Palliative Performance Status Version 2:         %  Protein Calorie Malutrition:  [ ] Mild   [ ] Moderate   [ ] Severe     Vital Signs Last 24 Hrs  T(C): 36.3 (01 Mar 2018 12:59), Max: 36.4 (01 Mar 2018 00:40)  T(F): 97.3 (01 Mar 2018 12:59), Max: 97.6 (01 Mar 2018 00:40)  HR: 83 (01 Mar 2018 12:59) (82 - 97)  BP: 122/66 (01 Mar 2018 12:59) (104/65 - 124/74)  BP(mean): --  RR: 16 (01 Mar 2018 12:59) (16 - 18)  SpO2: 100% (01 Mar 2018 12:59) (88% - 100%)    Physical Exam:    General: [ ] Alert,  A&O x     [ ] lethargic   [ ] Agitated   [ ] Cachexia   HEENT: [ ] Normal   [ ] Dry mouth   [ ] ET Tube    [ ] Trach   Lungs: [ ] Clear [ ] Rhonchi  [ ] Crackles [ ] Wheezing [ ] Tachypnea  [ ] Audible excessive secretions   Cardiovascular:  [ ] Regular rate and rhythm  [ ] Irregular [ ] Tachycardia   [ ] Bradycardia   Abdomen: [ ] Soft  [ ] Distended  [ ]  [ ] +BS  [ ] Non tender [ ] Tender  [ ]PEG   [ ] NGT   Last BM:     Genitourinary: [ ] Normal [ ] Incontinent   [ ] Oliguria/Anuria   [ ] Bellamy  Musculoskeletal:  [ ] Normal   [ ] Generalized weakness  [ ] Bedbound   Neurological: [ ] No focal deficits  [ ] Cognitive impairment     Skin: [ ] Normal   [ ] Pressure ulcers     LABS:                        10.6   8.45  )-----------( 121      ( 01 Mar 2018 05:55 )             33.2     03-01    130<L>  |  92<L>  |  51<H>  ----------------------------<  299<H>  3.8   |  25  |  5.17<H>    Ca    8.7      01 Mar 2018 05:55  Phos  2.6     02-28  Mg     1.9     02-28    TPro  7.7  /  Alb  2.9<L>  /  TBili  0.5  /  DBili  x   /  AST  86<H>  /  ALT  84<H>  /  AlkPhos  245<H>  03-01    PT/INR - ( 01 Mar 2018 05:55 )   PT: 23.3 SEC;   INR: 2.00          PTT - ( 01 Mar 2018 05:55 )  PTT:44.2 SEC    I&O's Summary    28 Feb 2018 07:01  -  01 Mar 2018 07:00  --------------------------------------------------------  IN: 600 mL / OUT: 0 mL / NET: 600 mL        RADIOLOGY & ADDITIONAL STUDIES: HPI:  Patient is a 62 year old man with ESRD (HD MWF), NICM (EF 21%) s/p ICD, PICC line in place with home dobutamine drip, atrial fibrillation on coumadin, COPD on home O2 (4-5L), pulmonary fibrosis, hypotension on midodrine with recent admission 1/23-1/31 for diarrhea found to be positive for human meta pneumovirus who now returns to [facility] ER complaining of shortness of breath that started earlier this evening. He describes the sensation as his "oxygen not being enough him." He denies sick contacts or travel since last admission. He denies fever, chills, cough, chest pain, wheezing, abdominal pain, vomiting. He endorses LE edema worse than usual as well as one episode of non-bloody diarrhea. Patient states he tried his inhaler with no relief of SOB. He completed HD yesterday and had removal of 3.5L. He denies dietary indiscretion. Patient currently is on BiPAP and feels comfortable using the mask. Pt does not make urine.     In ER: T 98.4, HR 83, BP 91/52, RR 14, SpO2 98% on 40% FiO2 on BiPAP 12/5. Patient received dobutamine gtts at home dose in ER. RVP+ for influenza B infection. (13 Feb 2018 22:11)        He expressed marked fatigue but a puzzling description "I am so tired that I cannot fall asleep"  He reports no cognitive haze and no history of sleep disturbance  His dyspnea is improving and is not dyspneic right now  He complained of a very dry mouth  No pain  No consti[pation  +LE edema of fluctuating intensity  He has no UO  He has been on HD for  approx three years  No issues with appetite  He lives in a one level home in [place] with some ambulation challenges entering the home  He resides there with his son and wife    PERTINENT PMH REVIEWED:  [x ] YES [ ] NO           SOCIAL HISTORY:  Significant other/partner:  [x ] YES  [ ] NO            Children:  [ x] YES  [ ] NO                   Gnosticism/Spirituality: Taoism  Substance hx:  [ ] YES   [ x] NO           Tobacco hx:  [x ] YES  [ ] NO             Alcohol hx: [ ] YES  [x ] NO        Home Opioid hx:  [ ] YES  [ x] NO   Living Situation: x[ ] Home  [ ] Long term care  [ ] Rehab    REFERRALS:   [x ] Chaplaincy  [ ] Hospice  [ ] Child Life  [ ] Social Work  [ ] Case management [ ] Holistic Therapy     FAMILY HISTORY:  No pertinent family history in first degree relatives    [x ] Family history non contributory     BASELINE ADLs (prior to admission):  Independent [ ] moderately [ ] fully   Dependent   [x ] moderately [ ] fully    ADVANCE DIRECTIVES:  [ ] YES [x ] NO   DNR [ ] YES [ x] NO                      MOLST  [ ] YES [ x] NO    Living Will  [ ] YES [x ] NO    Health Care Proxy [ ] YES  [x ] NO      [ x] Surrogate  [ ] HCP  [ ] Guardian:    Wife                                                              Phone#:    Allergies    No Known Allergies    Intolerances        MEDICATIONS  (STANDING):  ALBUTerol/ipratropium for Nebulization 3 milliLiter(s) Nebulizer every 6 hours  amiodarone    Tablet 100 milliGRAM(s) Oral daily  aspirin enteric coated 81 milliGRAM(s) Oral daily  atorvastatin 80 milliGRAM(s) Oral at bedtime  buDESOnide   90 MICROgram(s) Inhaler 2 Puff(s) Inhalation two times a day  chlorhexidine 4% Liquid 1 Application(s) Topical daily  dextrose 5%. 1000 milliLiter(s) (50 mL/Hr) IV Continuous <Continuous>  dextrose 5%. 1000 milliLiter(s) (50 mL/Hr) IV Continuous <Continuous>  dextrose 50% Injectable 12.5 Gram(s) IV Push once  dextrose 50% Injectable 25 Gram(s) IV Push once  dextrose 50% Injectable 25 Gram(s) IV Push once  DOBUTamine Infusion 7.5 MICROgram(s)/kG/Min (16.942 mL/Hr) IV Continuous <Continuous>  docusate sodium 100 milliGRAM(s) Oral two times a day  finasteride 5 milliGRAM(s) Oral daily  influenza   Vaccine 0.5 milliLiter(s) IntraMuscular once  insulin glargine Injectable (LANTUS) 16 Unit(s) SubCutaneous at bedtime  insulin lispro (HumaLOG) corrective regimen sliding scale   SubCutaneous three times a day before meals  insulin lispro (HumaLOG) corrective regimen sliding scale   SubCutaneous at bedtime  insulin lispro Injectable (HumaLOG) 2 Unit(s) SubCutaneous three times a day before meals  levothyroxine 75 MICROGram(s) Oral daily  midodrine 30 milliGRAM(s) Oral three times a day  MIRACLE MOUTHWASH 30 milliLiter(s) Swish and Spit three times a day  Nephro-lynda 1 Tablet(s) Oral daily  polyethylene glycol 3350 17 Gram(s) Oral daily  predniSONE   Tablet 20 milliGRAM(s) Oral daily  senna 2 Tablet(s) Oral at bedtime  warfarin 3 milliGRAM(s) Oral once    MEDICATIONS  (PRN):  benzocaine 15 mG/menthol 3.6 mG Lozenge 1 Lozenge Oral every 6 hours PRN Sore Throat  dextrose Gel 1 Dose(s) Oral once PRN Blood Glucose LESS THAN 70 milliGRAM(s)/deciLiter  dextrose Gel 1 Dose(s) Oral once PRN Blood Glucose LESS THAN 70 milliGRAM(s)/deciliter  glucagon  Injectable 1 milliGRAM(s) IntraMuscular once PRN Glucose <70 milliGRAM(s)/deciLiter  guaiFENesin    Syrup 100 milliGRAM(s) Oral every 6 hours PRN Cough      PRESENT SYMPTOMS:  Source: [x ] Patient   [ ] Family   [ ] Team     Pain: [ ] YES [x ] NO  OLDCARTS:     Dyspnea: [x ] YES [ ] NO   Anxiety: [ ] YES [x ] NO  Fatigue: [x ] YES [ ] NO   Nausea: [ ] YES  [x ] NO  Loss of appetite: [ ] YES [x ] NO   Constipation: [x ] YES [ ] NO     Other Symptoms:  [x ] All other review of systems negative   [ ] Unable to obtain due to poor mentation     Karnofsky Performance Score/Palliative Performance Status Version 2:  40       %  Protein Calorie Malutrition:  [ ] Mild   [ ] Moderate   [ ] Severe     Vital Signs Last 24 Hrs  T(C): 36.3 (01 Mar 2018 12:59), Max: 36.4 (01 Mar 2018 00:40)  T(F): 97.3 (01 Mar 2018 12:59), Max: 97.6 (01 Mar 2018 00:40)  HR: 83 (01 Mar 2018 12:59) (82 - 97)  BP: 122/66 (01 Mar 2018 12:59) (104/65 - 124/74)  BP(mean): --  RR: 16 (01 Mar 2018 12:59) (16 - 18)  SpO2: 100% (01 Mar 2018 12:59) (88% - 100%)    Physical Exam:    General: [x ] Alert,  A&O x     [ ] lethargic   [ ] Agitated   [ ] Cachexia   HEENT: [ ] Normal   [x ] Dry mouth   [ ] ET Tube    [ ] Trach   Lungs: [ ] Clear [ x] Rhonchi  [ ] Crackles [ ] Wheezing [ ] Tachypnea  [ ] Audible excessive secretions  Faint  Cardiovascular:  [ ] Regular rate and rhythm  [ ] Irregular [ ] Tachycardia   [ ] Bradycardia LE edema bilaterally  Abdomen: [x ] Soft  [x ] Distended  [ ]  [ ] +BS  [ x] Non tender [ ] Tender  [ ]PEG   [ ] NGT   Last BM:     Genitourinary: [ ] Normal [ ] Incontinent   [ x] Oliguria/Anuria   [ ] Bellamy  Musculoskeletal:  [ ] Normal   [ x] Generalized weakness  [ ] Bedbound   Neurological: [ x] No focal deficits  [ ] Cognitive impairment     Skin: [  ] Normal   [ ] Pressure ulcers taut LE skin with a slight sheen    LABS:                        10.6   8.45  )-----------( 121      ( 01 Mar 2018 05:55 )             33.2     03-01    130<L>  |  92<L>  |  51<H>  ----------------------------<  299<H>  3.8   |  25  |  5.17<H>    Ca    8.7      01 Mar 2018 05:55  Phos  2.6     02-28  Mg     1.9     02-28    TPro  7.7  /  Alb  2.9<L>  /  TBili  0.5  /  DBili  x   /  AST  86<H>  /  ALT  84<H>  /  AlkPhos  245<H>  03-01    PT/INR - ( 01 Mar 2018 05:55 )   PT: 23.3 SEC;   INR: 2.00          PTT - ( 01 Mar 2018 05:55 )  PTT:44.2 SEC    I&O's Summary    28 Feb 2018 07:01  -  01 Mar 2018 07:00  --------------------------------------------------------  IN: 600 mL / OUT: 0 mL / NET: 600 mL        RADIOLOGY & ADDITIONAL STUDIES:

## 2018-03-01 NOTE — CONSULT NOTE ADULT - PROBLEM SELECTOR PROBLEM 1
ESRD (end stage renal disease) on dialysis
Respiratory failure
Chronic heart failure, unspecified heart failure type

## 2018-03-01 NOTE — CONSULT NOTE ADULT - PROBLEM SELECTOR RECOMMENDATION 6
Seems to be compensated at this time: There is no pedal edema
Minimal at this time  corticosteroids helping  if opiates are used consider judicious use given parenchymal respiratory compromise, ESRD, and hypotension

## 2018-03-01 NOTE — CONSULT NOTE ADULT - PROBLEM SELECTOR RECOMMENDATION 8
HR is controlled: On AC
Pt would like his wife and two sisters ( Irma  & Rosario ) to be his health care proxy  He has five children  His hope is to go home and get a home attendant  Will confer with other consultants about forthcoming treatment/interventions

## 2018-03-01 NOTE — CONSULT NOTE ADULT - CONSULT REASON
CHF
Assistance with medical decision making, Pt with seven admissions within a single year
ESRD
Respiratory Failure

## 2018-03-01 NOTE — CONSULT NOTE ADULT - PROBLEM SELECTOR RECOMMENDATION 9
Dobutamine dependent disease  AICD present Dobutamine dependent disease  AICD present  Significant weight loss over a short period of time  ?cardiac cachexia  Followed by Dr. Davis

## 2018-03-02 LAB
APTT BLD: 39.1 SEC — HIGH (ref 27.5–37.4)
BASE EXCESS BLDA CALC-SCNC: -0.4 MMOL/L — SIGNIFICANT CHANGE UP
BUN SERPL-MCNC: 39 MG/DL — HIGH (ref 7–23)
CALCIUM SERPL-MCNC: 8.5 MG/DL — SIGNIFICANT CHANGE UP (ref 8.4–10.5)
CHLORIDE SERPL-SCNC: 94 MMOL/L — LOW (ref 98–107)
CO2 SERPL-SCNC: 26 MMOL/L — SIGNIFICANT CHANGE UP (ref 22–31)
CREAT SERPL-MCNC: 3.87 MG/DL — HIGH (ref 0.5–1.3)
GLUCOSE SERPL-MCNC: 287 MG/DL — HIGH (ref 70–99)
HCO3 BLDA-SCNC: 24 MMOL/L — SIGNIFICANT CHANGE UP (ref 22–26)
HCT VFR BLD CALC: 32.6 % — LOW (ref 39–50)
HGB BLD-MCNC: 10.5 G/DL — LOW (ref 13–17)
INR BLD: 2.09 — HIGH (ref 0.88–1.17)
MCHC RBC-ENTMCNC: 30.6 PG — SIGNIFICANT CHANGE UP (ref 27–34)
MCHC RBC-ENTMCNC: 32.2 % — SIGNIFICANT CHANGE UP (ref 32–36)
MCV RBC AUTO: 95 FL — SIGNIFICANT CHANGE UP (ref 80–100)
NRBC # FLD: 0 — SIGNIFICANT CHANGE UP
PCO2 BLDA: 42 MMHG — SIGNIFICANT CHANGE UP (ref 35–48)
PH BLDA: 7.38 PH — SIGNIFICANT CHANGE UP (ref 7.35–7.45)
PLATELET # BLD AUTO: 120 K/UL — LOW (ref 150–400)
PMV BLD: 13.2 FL — HIGH (ref 7–13)
PO2 BLDA: 58 MMHG — LOW (ref 83–108)
POTASSIUM SERPL-MCNC: 3.9 MMOL/L — SIGNIFICANT CHANGE UP (ref 3.5–5.3)
POTASSIUM SERPL-SCNC: 3.9 MMOL/L — SIGNIFICANT CHANGE UP (ref 3.5–5.3)
PROTHROM AB SERPL-ACNC: 23.6 SEC — HIGH (ref 9.8–13.1)
RBC # BLD: 3.43 M/UL — LOW (ref 4.2–5.8)
RBC # FLD: 17.2 % — HIGH (ref 10.3–14.5)
SAO2 % BLDA: 87.5 % — LOW (ref 95–99)
SODIUM SERPL-SCNC: 133 MMOL/L — LOW (ref 135–145)
WBC # BLD: 10.32 K/UL — SIGNIFICANT CHANGE UP (ref 3.8–10.5)
WBC # FLD AUTO: 10.32 K/UL — SIGNIFICANT CHANGE UP (ref 3.8–10.5)

## 2018-03-02 PROCEDURE — 99233 SBSQ HOSP IP/OBS HIGH 50: CPT

## 2018-03-02 RX ORDER — WARFARIN SODIUM 2.5 MG/1
3 TABLET ORAL ONCE
Qty: 0 | Refills: 0 | Status: COMPLETED | OUTPATIENT
Start: 2018-03-02 | End: 2018-03-02

## 2018-03-02 RX ORDER — INSULIN LISPRO 100/ML
5 VIAL (ML) SUBCUTANEOUS
Qty: 0 | Refills: 0 | Status: DISCONTINUED | OUTPATIENT
Start: 2018-03-02 | End: 2018-03-05

## 2018-03-02 RX ADMIN — Medication 5 UNIT(S): at 13:30

## 2018-03-02 RX ADMIN — Medication 0: at 22:01

## 2018-03-02 RX ADMIN — Medication 5 UNIT(S): at 18:11

## 2018-03-02 RX ADMIN — DIPHENHYDRAMINE HYDROCHLORIDE AND LIDOCAINE HYDROCHLORIDE AND ALUMINUM HYDROXIDE AND MAGNESIUM HYDRO 30 MILLILITER(S): KIT at 13:31

## 2018-03-02 RX ADMIN — Medication 3 MILLILITER(S): at 09:12

## 2018-03-02 RX ADMIN — Medication 1 TABLET(S): at 13:31

## 2018-03-02 RX ADMIN — Medication 100 MILLIGRAM(S): at 18:10

## 2018-03-02 RX ADMIN — Medication 3 MILLILITER(S): at 22:12

## 2018-03-02 RX ADMIN — MIDODRINE HYDROCHLORIDE 30 MILLIGRAM(S): 2.5 TABLET ORAL at 22:52

## 2018-03-02 RX ADMIN — CHLORHEXIDINE GLUCONATE 1 APPLICATION(S): 213 SOLUTION TOPICAL at 13:32

## 2018-03-02 RX ADMIN — MIDODRINE HYDROCHLORIDE 30 MILLIGRAM(S): 2.5 TABLET ORAL at 05:59

## 2018-03-02 RX ADMIN — SENNA PLUS 2 TABLET(S): 8.6 TABLET ORAL at 22:02

## 2018-03-02 RX ADMIN — MIDODRINE HYDROCHLORIDE 30 MILLIGRAM(S): 2.5 TABLET ORAL at 13:31

## 2018-03-02 RX ADMIN — Medication 81 MILLIGRAM(S): at 13:31

## 2018-03-02 RX ADMIN — Medication 3 MILLILITER(S): at 04:00

## 2018-03-02 RX ADMIN — Medication 2 PUFF(S): at 21:59

## 2018-03-02 RX ADMIN — POLYETHYLENE GLYCOL 3350 17 GRAM(S): 17 POWDER, FOR SOLUTION ORAL at 13:32

## 2018-03-02 RX ADMIN — Medication 3 MILLILITER(S): at 15:50

## 2018-03-02 RX ADMIN — DIPHENHYDRAMINE HYDROCHLORIDE AND LIDOCAINE HYDROCHLORIDE AND ALUMINUM HYDROXIDE AND MAGNESIUM HYDRO 30 MILLILITER(S): KIT at 06:01

## 2018-03-02 RX ADMIN — AMIODARONE HYDROCHLORIDE 100 MILLIGRAM(S): 400 TABLET ORAL at 05:58

## 2018-03-02 RX ADMIN — Medication 3: at 18:10

## 2018-03-02 RX ADMIN — Medication 20 MILLIGRAM(S): at 06:01

## 2018-03-02 RX ADMIN — Medication 16.94 MICROGRAM(S)/KG/MIN: at 21:58

## 2018-03-02 RX ADMIN — INSULIN GLARGINE 16 UNIT(S): 100 INJECTION, SOLUTION SUBCUTANEOUS at 22:00

## 2018-03-02 RX ADMIN — ATORVASTATIN CALCIUM 80 MILLIGRAM(S): 80 TABLET, FILM COATED ORAL at 22:02

## 2018-03-02 RX ADMIN — WARFARIN SODIUM 3 MILLIGRAM(S): 2.5 TABLET ORAL at 18:10

## 2018-03-02 RX ADMIN — Medication 2 PUFF(S): at 10:01

## 2018-03-02 RX ADMIN — Medication 75 MICROGRAM(S): at 05:59

## 2018-03-02 RX ADMIN — Medication 5: at 13:30

## 2018-03-02 RX ADMIN — FINASTERIDE 5 MILLIGRAM(S): 5 TABLET, FILM COATED ORAL at 13:31

## 2018-03-02 RX ADMIN — Medication 100 MILLIGRAM(S): at 05:59

## 2018-03-02 RX ADMIN — Medication 5 UNIT(S): at 10:00

## 2018-03-02 RX ADMIN — Medication 16.94 MICROGRAM(S)/KG/MIN: at 07:00

## 2018-03-02 RX ADMIN — Medication 3: at 10:00

## 2018-03-02 NOTE — PROGRESS NOTE ADULT - PROBLEM SELECTOR PLAN 7
monitor blood glucose: defer to primary care:  3/1: blood glucose is  uncontrolled: ? secondary to steroids: would defer to endo  3/2: still uncontrolled: defer to primary care:

## 2018-03-02 NOTE — PROGRESS NOTE ADULT - SUBJECTIVE AND OBJECTIVE BOX
CC: F/U for respiratory failure    SUBJECTIVE / OVERNIGHT EVENTS:  Complains about difficulty breathing - more compared to yesterday.  Currently requiring VentiMask.  No F/C, N/V, CP, lightheadedness, dizziness, abdominal pain, diarrhea, dysuria.    MEDICATIONS  (STANDING):  ALBUTerol/ipratropium for Nebulization 3 milliLiter(s) Nebulizer every 6 hours  amiodarone    Tablet 100 milliGRAM(s) Oral daily  aspirin enteric coated 81 milliGRAM(s) Oral daily  atorvastatin 80 milliGRAM(s) Oral at bedtime  buDESOnide   90 MICROgram(s) Inhaler 2 Puff(s) Inhalation two times a day  chlorhexidine 4% Liquid 1 Application(s) Topical daily  dextrose 5%. 1000 milliLiter(s) (50 mL/Hr) IV Continuous <Continuous>  dextrose 5%. 1000 milliLiter(s) (50 mL/Hr) IV Continuous <Continuous>  dextrose 50% Injectable 12.5 Gram(s) IV Push once  dextrose 50% Injectable 25 Gram(s) IV Push once  dextrose 50% Injectable 25 Gram(s) IV Push once  DOBUTamine Infusion 7.5 MICROgram(s)/kG/Min (16.942 mL/Hr) IV Continuous <Continuous>  docusate sodium 100 milliGRAM(s) Oral two times a day  finasteride 5 milliGRAM(s) Oral daily  influenza   Vaccine 0.5 milliLiter(s) IntraMuscular once  insulin glargine Injectable (LANTUS) 16 Unit(s) SubCutaneous at bedtime  insulin lispro (HumaLOG) corrective regimen sliding scale   SubCutaneous three times a day before meals  insulin lispro (HumaLOG) corrective regimen sliding scale   SubCutaneous at bedtime  insulin lispro Injectable (HumaLOG) 5 Unit(s) SubCutaneous three times a day before meals  levothyroxine 75 MICROGram(s) Oral daily  midodrine 30 milliGRAM(s) Oral three times a day  MIRACLE MOUTHWASH 30 milliLiter(s) Swish and Spit three times a day  Nephro-lynda 1 Tablet(s) Oral daily  polyethylene glycol 3350 17 Gram(s) Oral daily  predniSONE   Tablet 20 milliGRAM(s) Oral daily  senna 2 Tablet(s) Oral at bedtime  warfarin 3 milliGRAM(s) Oral once    MEDICATIONS  (PRN):  benzocaine 15 mG/menthol 3.6 mG Lozenge 1 Lozenge Oral every 6 hours PRN Sore Throat  dextrose Gel 1 Dose(s) Oral once PRN Blood Glucose LESS THAN 70 milliGRAM(s)/deciLiter  dextrose Gel 1 Dose(s) Oral once PRN Blood Glucose LESS THAN 70 milliGRAM(s)/deciliter  glucagon  Injectable 1 milliGRAM(s) IntraMuscular once PRN Glucose <70 milliGRAM(s)/deciLiter  guaiFENesin    Syrup 100 milliGRAM(s) Oral every 6 hours PRN Cough      Vital Signs Last 24 Hrs  T(C): 36.2 (02 Mar 2018 08:28), Max: 36.7 (01 Mar 2018 20:35)  T(F): 97.1 (02 Mar 2018 08:28), Max: 98.1 (01 Mar 2018 20:35)  HR: 86 (02 Mar 2018 10:26) (67 - 97)  BP: 124/65 (02 Mar 2018 08:28) (106/72 - 128/74)  BP(mean): --  RR: 18 (02 Mar 2018 08:28) (16 - 19)  SpO2: 92% (02 Mar 2018 10:26) (92% - 100%)  CAPILLARY BLOOD GLUCOSE      POCT Blood Glucose.: 256 mg/dL (02 Mar 2018 09:18)  POCT Blood Glucose.: 246 mg/dL (01 Mar 2018 22:19)  POCT Blood Glucose.: 255 mg/dL (01 Mar 2018 17:29)  POCT Blood Glucose.: 397 mg/dL (01 Mar 2018 13:02)    I&O's Summary    01 Mar 2018 07:01  -  02 Mar 2018 07:00  --------------------------------------------------------  IN: 702.8 mL / OUT: 2500 mL / NET: -1797.2 mL        PHYSICAL EXAM:  GENERAL: NAD, frail appearing  HEAD:  Atraumatic, Normocephalic  EYES: EOMI, PERRLA, conjunctiva and sclera clear  NECK: Supple.  CHEST/LUNG: Diffuse crackles on inspiration.  Mild wheeze.  HEART: Afib; No murmurs, rubs, or gallops  ABDOMEN: Soft, Nontender, Nondistended; Bowel sounds present  EXTREMITIES:  2+ Peripheral Pulses, No clubbing, cyanosis, or edema, LUE PICC in place.  PSYCH: Calm  NEUROLOGY: A/Ox3, non-focal  SKIN: No rashes or lesions    LABS:                        10.5   10.32 )-----------( 120      ( 02 Mar 2018 05:50 )             32.6     03-02    133<L>  |  94<L>  |  39<H>  ----------------------------<  287<H>  3.9   |  26  |  3.87<H>    Ca    8.5      02 Mar 2018 05:50    TPro  7.7  /  Alb  2.9<L>  /  TBili  0.5  /  DBili  x   /  AST  86<H>  /  ALT  84<H>  /  AlkPhos  245<H>  03-01    PT/INR - ( 02 Mar 2018 05:50 )   PT: 23.6 SEC;   INR: 2.09          PTT - ( 02 Mar 2018 05:50 )  PTT:39.1 SEC          RADIOLOGY & ADDITIONAL TESTS:    Imaging Personally Reviewed:    Care Discussed with Consultants/Other Providers:

## 2018-03-02 NOTE — PROGRESS NOTE ADULT - PROBLEM SELECTOR PLAN 1
due to influenza and underlying COPD, pulmonary fibrosis. s/p course of Tamiflu and CCU. Appreciate Pulmonology input - supplemental oxygen to maintain O2 sat >90% on PO steroids. Still with respiratory distress.  -BiPAP hs - on prednisone 20mg daily. As per cardio, no indication for TTE with bubble for R to L shunt eval because patient is not candidate for possible PFO repair at this time.

## 2018-03-02 NOTE — PROGRESS NOTE ADULT - PROBLEM SELECTOR PLAN 1
HD yesterday, with 2Kg achieved.   K acceptable. Plan for repeat HD tomorrow  Continue midodrine.   c/w renal diet, add fluid restriction 1.2L/day, reinforced w/pt

## 2018-03-02 NOTE — PROGRESS NOTE ADULT - PROBLEM SELECTOR PLAN 4
titrate oxygen to keep o2 sat>=90%  c/w suman and pulmicort  2/26 began prednisone 20mg po daily  2/28: cont steroids to see how he responds to it in next few days  3/1: cont steroids as well as BD  3/2: cont wheezing

## 2018-03-02 NOTE — PROGRESS NOTE ADULT - PROBLEM SELECTOR PLAN 1
has underlying pulmonary fibrosis and respiratory failure precipitated by influenza:   Titrate fio2 to keep o2 sat >=90% remains on 50% VM  NIV QHS/PRV  2/28: cont NIV at night and daytime too  3/1: says his breathing is better: would cont to use steroids as well as bipap at night time: Cont oxygen  to keep sao2 above 88%:  3/2: doing reasonable: cont current care:

## 2018-03-02 NOTE — PROGRESS NOTE ADULT - SUBJECTIVE AND OBJECTIVE BOX
Pt seen and examined at bedside  Pt clinically unchanged. Denies SOB. Hd HD yesterday.     Allergies:  No Known Allergies    Hospital Medications:   MEDICATIONS  (STANDING):  ALBUTerol/ipratropium for Nebulization 3 milliLiter(s) Nebulizer every 6 hours  amiodarone    Tablet 100 milliGRAM(s) Oral daily  aspirin enteric coated 81 milliGRAM(s) Oral daily  atorvastatin 80 milliGRAM(s) Oral at bedtime  buDESOnide   90 MICROgram(s) Inhaler 2 Puff(s) Inhalation two times a day  chlorhexidine 4% Liquid 1 Application(s) Topical daily  dextrose 5%. 1000 milliLiter(s) (50 mL/Hr) IV Continuous <Continuous>  dextrose 5%. 1000 milliLiter(s) (50 mL/Hr) IV Continuous <Continuous>  dextrose 50% Injectable 12.5 Gram(s) IV Push once  dextrose 50% Injectable 25 Gram(s) IV Push once  dextrose 50% Injectable 25 Gram(s) IV Push once  DOBUTamine Infusion 7.5 MICROgram(s)/kG/Min (16.942 mL/Hr) IV Continuous <Continuous>  docusate sodium 100 milliGRAM(s) Oral two times a day  finasteride 5 milliGRAM(s) Oral daily  influenza   Vaccine 0.5 milliLiter(s) IntraMuscular once  insulin glargine Injectable (LANTUS) 16 Unit(s) SubCutaneous at bedtime  insulin lispro (HumaLOG) corrective regimen sliding scale   SubCutaneous three times a day before meals  insulin lispro (HumaLOG) corrective regimen sliding scale   SubCutaneous at bedtime  insulin lispro Injectable (HumaLOG) 5 Unit(s) SubCutaneous three times a day before meals  levothyroxine 75 MICROGram(s) Oral daily  midodrine 30 milliGRAM(s) Oral three times a day  MIRACLE MOUTHWASH 30 milliLiter(s) Swish and Spit three times a day  Nephro-lynda 1 Tablet(s) Oral daily  polyethylene glycol 3350 17 Gram(s) Oral daily  predniSONE   Tablet 20 milliGRAM(s) Oral daily  senna 2 Tablet(s) Oral at bedtime  warfarin 3 milliGRAM(s) Oral once    VITALS:  T(F): 96.9 (03-02-18 @ 12:26), Max: 98.1 (03-01-18 @ 20:35)  HR: 86 (03-02-18 @ 12:26)  BP: 116/61 (03-02-18 @ 12:26)  RR: 19 (03-02-18 @ 12:26)  SpO2: 96% (03-02-18 @ 12:26)  Wt(kg): --    03-01 @ 07:01  -  03-02 @ 07:00  --------------------------------------------------------  IN: 702.8 mL / OUT: 2500 mL / NET: -1797.2 mL        PHYSICAL EXAM:  Constitutional: NAD  Constitutional: NAD  HEENT: anicteric sclera, oropharynx clear, MMM  Neck: No JVD  Respiratory: Fine bilateral crackles.   Cardiovascular: S1, S2, RRR  Gastrointestinal: BS+, soft, NT/ND  Extremities: No cyanosis or clubbing. 1+ peripheral edema  Neurological: A/O x 3, no focal deficits  Psychiatric: Normal mood, normal affect  : No CVA tenderness. No rosa.   Skin: No rashes  Vascular Access: RIJ tunneled cath   LABS:  03-02    133<L>  |  94<L>  |  39<H>  ----------------------------<  287<H>  3.9   |  26  |  3.87<H>    Ca    8.5      02 Mar 2018 05:50    TPro  7.7  /  Alb  2.9<L>  /  TBili  0.5  /  DBili      /  AST  86<H>  /  ALT  84<H>  /  AlkPhos  245<H>  03-01    Creatinine Trend: 3.87 <--, 5.17 <--, 3.92 <--, 5.08 <--, 7.29 <--, 5.93 <--, 4.87 <--                        10.5   10.32 )-----------( 120      ( 02 Mar 2018 05:50 )             32.6     Urine Studies:      RADIOLOGY & ADDITIONAL STUDIES:

## 2018-03-02 NOTE — CHART NOTE - NSCHARTNOTEFT_GEN_A_CORE
Palliative Medicine    Pt seen earlier today post desaturation event, wearing VEnti  Having ABG drawn  Examination deferred  Will follow

## 2018-03-02 NOTE — PROGRESS NOTE ADULT - SUBJECTIVE AND OBJECTIVE BOX
Patient is a 62y old  Male who presents with a chief complaint of SOB x few hours (20 Feb 2018 18:05)      Any change in ROS: doing ok   + sob  says his breathing is " off and on"     MEDICATIONS  (STANDING):  ALBUTerol/ipratropium for Nebulization 3 milliLiter(s) Nebulizer every 6 hours  amiodarone    Tablet 100 milliGRAM(s) Oral daily  aspirin enteric coated 81 milliGRAM(s) Oral daily  atorvastatin 80 milliGRAM(s) Oral at bedtime  buDESOnide   90 MICROgram(s) Inhaler 2 Puff(s) Inhalation two times a day  chlorhexidine 4% Liquid 1 Application(s) Topical daily  dextrose 5%. 1000 milliLiter(s) (50 mL/Hr) IV Continuous <Continuous>  dextrose 5%. 1000 milliLiter(s) (50 mL/Hr) IV Continuous <Continuous>  dextrose 50% Injectable 12.5 Gram(s) IV Push once  dextrose 50% Injectable 25 Gram(s) IV Push once  dextrose 50% Injectable 25 Gram(s) IV Push once  DOBUTamine Infusion 7.5 MICROgram(s)/kG/Min (16.942 mL/Hr) IV Continuous <Continuous>  docusate sodium 100 milliGRAM(s) Oral two times a day  finasteride 5 milliGRAM(s) Oral daily  influenza   Vaccine 0.5 milliLiter(s) IntraMuscular once  insulin glargine Injectable (LANTUS) 16 Unit(s) SubCutaneous at bedtime  insulin lispro (HumaLOG) corrective regimen sliding scale   SubCutaneous three times a day before meals  insulin lispro (HumaLOG) corrective regimen sliding scale   SubCutaneous at bedtime  insulin lispro Injectable (HumaLOG) 5 Unit(s) SubCutaneous three times a day before meals  levothyroxine 75 MICROGram(s) Oral daily  midodrine 30 milliGRAM(s) Oral three times a day  MIRACLE MOUTHWASH 30 milliLiter(s) Swish and Spit three times a day  Nephro-lynda 1 Tablet(s) Oral daily  polyethylene glycol 3350 17 Gram(s) Oral daily  predniSONE   Tablet 20 milliGRAM(s) Oral daily  senna 2 Tablet(s) Oral at bedtime  warfarin 3 milliGRAM(s) Oral once    MEDICATIONS  (PRN):  benzocaine 15 mG/menthol 3.6 mG Lozenge 1 Lozenge Oral every 6 hours PRN Sore Throat  dextrose Gel 1 Dose(s) Oral once PRN Blood Glucose LESS THAN 70 milliGRAM(s)/deciLiter  dextrose Gel 1 Dose(s) Oral once PRN Blood Glucose LESS THAN 70 milliGRAM(s)/deciliter  glucagon  Injectable 1 milliGRAM(s) IntraMuscular once PRN Glucose <70 milliGRAM(s)/deciLiter  guaiFENesin    Syrup 100 milliGRAM(s) Oral every 6 hours PRN Cough    Vital Signs Last 24 Hrs  T(C): 36.1 (02 Mar 2018 12:26), Max: 36.7 (01 Mar 2018 20:35)  T(F): 96.9 (02 Mar 2018 12:26), Max: 98.1 (01 Mar 2018 20:35)  HR: 86 (02 Mar 2018 12:26) (67 - 97)  BP: 116/61 (02 Mar 2018 12:26) (106/72 - 128/74)  BP(mean): --  RR: 96 (02 Mar 2018 12:26) (16 - 96)  SpO2: 92% (02 Mar 2018 10:26) (92% - 100%)    I&O's Summary    01 Mar 2018 07:01  -  02 Mar 2018 07:00  --------------------------------------------------------  IN: 702.8 mL / OUT: 2500 mL / NET: -1797.2 mL          Physical Exam:   GENERAL: NAD, well-groomed, well-developed  HEENT: BELL/   Atraumatic, Normocephalic  ENMT: No tonsillar erythema, exudates, or enlargement; Moist mucous membranes, Good dentition, No lesions  NECK: Supple, No JVD, Normal thyroid  CHEST/LUNG: Clear to auscultaion  CVS: Regular rate and rhythm; No murmurs, rubs, or gallops  GI: : Soft, Nontender, Nondistended; Bowel sounds present  NERVOUS SYSTEM:  Alert & Oriented X3  EXTREMITIES:  +edema  LYMPH: No lymphadenopathy noted  SKIN: No rashes or lesions  ENDOCRINOLOGY: No Thyromegaly  PSYCH: Appropriate    Labs:                              10.5   10.32 )-----------( 120      ( 02 Mar 2018 05:50 )             32.6                         10.6   8.45  )-----------( 121      ( 01 Mar 2018 05:55 )             33.2                         10.7   8.45  )-----------( 124      ( 28 Feb 2018 09:35 )             33.0                         11.0   7.51  )-----------( 138      ( 27 Feb 2018 06:30 )             35.1     03-02    133<L>  |  94<L>  |  39<H>  ----------------------------<  287<H>  3.9   |  26  |  3.87<H>  03-01    130<L>  |  92<L>  |  51<H>  ----------------------------<  299<H>  3.8   |  25  |  5.17<H>  02-28    134<L>  |  95<L>  |  35<H>  ----------------------------<  235<H>  4.1   |  26  |  3.92<H>  02-27    133<L>  |  94<L>  |  36<H>  ----------------------------<  117<H>  3.4<L>   |  23  |  5.08<H>    Ca    8.5      02 Mar 2018 05:50  Ca    8.7      01 Mar 2018 05:55    TPro  7.7  /  Alb  2.9<L>  /  TBili  0.5  /  DBili  x   /  AST  86<H>  /  ALT  84<H>  /  AlkPhos  245<H>  03-01  TPro  7.7  /  Alb  2.8<L>  /  TBili  0.5  /  DBili  0.3<H>  /  AST  73<H>  /  ALT  61<H>  /  AlkPhos  216<H>  02-27    CAPILLARY BLOOD GLUCOSE      POCT Blood Glucose.: 358 mg/dL (02 Mar 2018 12:44)  POCT Blood Glucose.: 256 mg/dL (02 Mar 2018 09:18)  POCT Blood Glucose.: 246 mg/dL (01 Mar 2018 22:19)  POCT Blood Glucose.: 255 mg/dL (01 Mar 2018 17:29)  POCT Blood Glucose.: 397 mg/dL (01 Mar 2018 13:02)      LIVER FUNCTIONS - ( 01 Mar 2018 05:55 )  Alb: 2.9 g/dL / Pro: 7.7 g/dL / ALK PHOS: 245 u/L / ALT: 84 u/L / AST: 86 u/L / GGT: x           PT/INR - ( 02 Mar 2018 05:50 )   PT: 23.6 SEC;   INR: 2.09          PTT - ( 02 Mar 2018 05:50 )  PTT:39.1 SEC    Cultures:         < from: Xray Chest 1 View- PORTABLE-Urgent (02.26.18 @ 11:27) >  FORMATION: Shortness of breath.   Cardiomyopathy.    TECHNIQUE: AP chest radiograph    COMPARISON: Chest radiograph performed 2/17/2018. CT abdomen and pelvis   performed 10/30/2017.    FINDINGS:    A right-sided dialysis catheter terminates over SVC. There is a   left-sided cardiac device. There is redemonstration of diffuse   interstitial and reticular opacities bilaterally, mildly improved since   2/17/2018. There are small bilateral pleural effusions.    IMPRESSION:    Persistent diffuse interstitial and reticular lung opacities bilaterally,   mildly improved since 2/17/2018, which may represent pulmonary edema   superimposed on patient's known interstitial lung disease.    Small bilateral pleural effusions.              ANNIA MURPHY M.D., RADIOLOGY RESIDENT  This document has been electronically signed.  NITIN MAHARAJ M.D. ATTENDING RADIOLOGIST  This document has been electronically signed. Feb 26 2018  2:54PM        < end of copied text >                      Studies  Chest X-RAY  CT SCAN Chest   Venous Dopplers: LE:   CT Abdomen  Others

## 2018-03-02 NOTE — PROGRESS NOTE ADULT - PROBLEM SELECTOR PLAN 3
: supportive treatment:  titrate oxygen to keep o2 sat>=90%  2/26 began trial prednisone for sob-prednisone 20 mg po daily  2/28: started on a trial of prednisone two days ago, will monitor, however per his family, he never had a good response to previous trial of steroid.  3/1: on retrial of steroids: Pt thinks his breathing is better then before: Would like to cont steroids at this t alirio  3/2: cont steroids: pt seems to have stable SOB , does not seem to be improving further to me:

## 2018-03-02 NOTE — PROGRESS NOTE ADULT - PROBLEM SELECTOR PLAN 4
FS glucose elevated, likely because of prednisone.   - lantus to 16u - increasedx pre-meal insulin - continue HD support.

## 2018-03-03 LAB
BUN SERPL-MCNC: 62 MG/DL — HIGH (ref 7–23)
CALCIUM SERPL-MCNC: 8.6 MG/DL — SIGNIFICANT CHANGE UP (ref 8.4–10.5)
CHLORIDE SERPL-SCNC: 95 MMOL/L — LOW (ref 98–107)
CO2 SERPL-SCNC: 24 MMOL/L — SIGNIFICANT CHANGE UP (ref 22–31)
CREAT SERPL-MCNC: 5.31 MG/DL — HIGH (ref 0.5–1.3)
GLUCOSE SERPL-MCNC: 249 MG/DL — HIGH (ref 70–99)
HCT VFR BLD CALC: 33.5 % — LOW (ref 39–50)
HGB BLD-MCNC: 10.6 G/DL — LOW (ref 13–17)
INR BLD: 2.11 — HIGH (ref 0.88–1.17)
MAGNESIUM SERPL-MCNC: 1.8 MG/DL — SIGNIFICANT CHANGE UP (ref 1.6–2.6)
MCHC RBC-ENTMCNC: 30.2 PG — SIGNIFICANT CHANGE UP (ref 27–34)
MCHC RBC-ENTMCNC: 31.6 % — LOW (ref 32–36)
MCV RBC AUTO: 95.4 FL — SIGNIFICANT CHANGE UP (ref 80–100)
NRBC # FLD: 0 — SIGNIFICANT CHANGE UP
PLATELET # BLD AUTO: 123 K/UL — LOW (ref 150–400)
PMV BLD: 12.6 FL — SIGNIFICANT CHANGE UP (ref 7–13)
POTASSIUM SERPL-MCNC: 3.8 MMOL/L — SIGNIFICANT CHANGE UP (ref 3.5–5.3)
POTASSIUM SERPL-SCNC: 3.8 MMOL/L — SIGNIFICANT CHANGE UP (ref 3.5–5.3)
PROTHROM AB SERPL-ACNC: 24.6 SEC — HIGH (ref 9.8–13.1)
RBC # BLD: 3.51 M/UL — LOW (ref 4.2–5.8)
RBC # FLD: 17.3 % — HIGH (ref 10.3–14.5)
SODIUM SERPL-SCNC: 134 MMOL/L — LOW (ref 135–145)
WBC # BLD: 9.82 K/UL — SIGNIFICANT CHANGE UP (ref 3.8–10.5)
WBC # FLD AUTO: 9.82 K/UL — SIGNIFICANT CHANGE UP (ref 3.8–10.5)

## 2018-03-03 PROCEDURE — 99233 SBSQ HOSP IP/OBS HIGH 50: CPT

## 2018-03-03 RX ORDER — WARFARIN SODIUM 2.5 MG/1
3 TABLET ORAL ONCE
Qty: 0 | Refills: 0 | Status: COMPLETED | OUTPATIENT
Start: 2018-03-03 | End: 2018-03-03

## 2018-03-03 RX ADMIN — Medication 3 MILLILITER(S): at 04:02

## 2018-03-03 RX ADMIN — Medication 5 UNIT(S): at 13:22

## 2018-03-03 RX ADMIN — Medication 20 MILLIGRAM(S): at 05:59

## 2018-03-03 RX ADMIN — Medication 2 PUFF(S): at 12:12

## 2018-03-03 RX ADMIN — AMIODARONE HYDROCHLORIDE 100 MILLIGRAM(S): 400 TABLET ORAL at 12:10

## 2018-03-03 RX ADMIN — Medication 5 UNIT(S): at 09:03

## 2018-03-03 RX ADMIN — Medication 3 MILLILITER(S): at 16:31

## 2018-03-03 RX ADMIN — Medication 75 MICROGRAM(S): at 05:59

## 2018-03-03 RX ADMIN — FINASTERIDE 5 MILLIGRAM(S): 5 TABLET, FILM COATED ORAL at 12:10

## 2018-03-03 RX ADMIN — CHLORHEXIDINE GLUCONATE 1 APPLICATION(S): 213 SOLUTION TOPICAL at 12:12

## 2018-03-03 RX ADMIN — DIPHENHYDRAMINE HYDROCHLORIDE AND LIDOCAINE HYDROCHLORIDE AND ALUMINUM HYDROXIDE AND MAGNESIUM HYDRO 30 MILLILITER(S): KIT at 23:25

## 2018-03-03 RX ADMIN — MIDODRINE HYDROCHLORIDE 30 MILLIGRAM(S): 2.5 TABLET ORAL at 05:58

## 2018-03-03 RX ADMIN — Medication 81 MILLIGRAM(S): at 12:11

## 2018-03-03 RX ADMIN — MIDODRINE HYDROCHLORIDE 30 MILLIGRAM(S): 2.5 TABLET ORAL at 23:24

## 2018-03-03 RX ADMIN — Medication 2: at 13:22

## 2018-03-03 RX ADMIN — ATORVASTATIN CALCIUM 80 MILLIGRAM(S): 80 TABLET, FILM COATED ORAL at 23:23

## 2018-03-03 RX ADMIN — Medication 1 TABLET(S): at 12:11

## 2018-03-03 RX ADMIN — DIPHENHYDRAMINE HYDROCHLORIDE AND LIDOCAINE HYDROCHLORIDE AND ALUMINUM HYDROXIDE AND MAGNESIUM HYDRO 30 MILLILITER(S): KIT at 12:11

## 2018-03-03 RX ADMIN — Medication 5 UNIT(S): at 18:10

## 2018-03-03 RX ADMIN — Medication 16.94 MICROGRAM(S)/KG/MIN: at 23:24

## 2018-03-03 RX ADMIN — Medication 3 MILLILITER(S): at 21:50

## 2018-03-03 RX ADMIN — Medication 4: at 18:10

## 2018-03-03 RX ADMIN — WARFARIN SODIUM 3 MILLIGRAM(S): 2.5 TABLET ORAL at 18:59

## 2018-03-03 RX ADMIN — Medication 2 PUFF(S): at 23:23

## 2018-03-03 RX ADMIN — MIDODRINE HYDROCHLORIDE 30 MILLIGRAM(S): 2.5 TABLET ORAL at 12:10

## 2018-03-03 RX ADMIN — INSULIN GLARGINE 16 UNIT(S): 100 INJECTION, SOLUTION SUBCUTANEOUS at 23:22

## 2018-03-03 NOTE — PROGRESS NOTE ADULT - ASSESSMENT
62 M PMH ESRD on HD, NICM with severe systolic dysfunction (EF 21%), on chronic dobutamine gtt, also with AICD, chronic hypotension on midodrine, atrial fibrillation on warfarin, COPD, pulmonary fibrosis on home O2, initially presented with acute on chronic respiratory failure in setting of influenza B infection, also with exacerbation of chronic hypoxic respiratory failure. He was managed in the CCU on norepinephrine - transition to dobutamine/midodrine.    1.  Acute on chronic respiratory failure with hypoxia.     due to influenza and underlying COPD, pulmonary fibrosis. s/p course of Tamiflu and CCU.  continue oxygen   taper steroids      2. Chronic systolic heart failure.    s/p AICD and on chronic dobutamine. Patient chronically hypervolemic.  -c/w dobutamine infusion - HD/UF per renal.     3. Chronic hypotension.     BP stable and at goal. -c/w midodrine and dobutamine gtt.   f/u with cardio     4.Type 2 diabetes mellitus with end-stage renal disease.    sliding scale       5.Chronic obstructive pulmonary disease, unspecified COPD type.     Reduced breath sounds throughout. Pulmonology following. on prednisone 20mg daily. c/w Pulmicort.     6.  Pulmonary fibrosis.   c/w supportive care, supplemental oxygen.    7.  Atrial fibrillation, unspecified type.    On warfarin. Dose coumadin. Monitor INR. c/w amiodarone.   8. Hypothyroidism, unspecified type.     c/w levothyroxine daily.   9. renal failure   s/p mohamud luna

## 2018-03-03 NOTE — PROGRESS NOTE ADULT - SUBJECTIVE AND OBJECTIVE BOX
CC: F/U for respiratory failure    patient seen and examine at bed side  no acute issue  off bipap  still requiring nasal cannula   comfortable           MEDICATIONS  (STANDING):  ALBUTerol/ipratropium for Nebulization 3 milliLiter(s) Nebulizer every 6 hours  amiodarone    Tablet 100 milliGRAM(s) Oral daily  aspirin enteric coated 81 milliGRAM(s) Oral daily  atorvastatin 80 milliGRAM(s) Oral at bedtime  buDESOnide   90 MICROgram(s) Inhaler 2 Puff(s) Inhalation two times a day  chlorhexidine 4% Liquid 1 Application(s) Topical daily  dextrose 5%. 1000 milliLiter(s) (50 mL/Hr) IV Continuous <Continuous>  dextrose 5%. 1000 milliLiter(s) (50 mL/Hr) IV Continuous <Continuous>  dextrose 50% Injectable 12.5 Gram(s) IV Push once  dextrose 50% Injectable 25 Gram(s) IV Push once  dextrose 50% Injectable 25 Gram(s) IV Push once  DOBUTamine Infusion 7.5 MICROgram(s)/kG/Min (16.942 mL/Hr) IV Continuous <Continuous>  docusate sodium 100 milliGRAM(s) Oral two times a day  finasteride 5 milliGRAM(s) Oral daily  influenza   Vaccine 0.5 milliLiter(s) IntraMuscular once  insulin glargine Injectable (LANTUS) 16 Unit(s) SubCutaneous at bedtime  insulin lispro (HumaLOG) corrective regimen sliding scale   SubCutaneous three times a day before meals  insulin lispro (HumaLOG) corrective regimen sliding scale   SubCutaneous at bedtime  insulin lispro Injectable (HumaLOG) 5 Unit(s) SubCutaneous three times a day before meals  levothyroxine 75 MICROGram(s) Oral daily  midodrine 30 milliGRAM(s) Oral three times a day  MIRACLE MOUTHWASH 30 milliLiter(s) Swish and Spit three times a day  Nephro-lynda 1 Tablet(s) Oral daily  polyethylene glycol 3350 17 Gram(s) Oral daily  predniSONE   Tablet 20 milliGRAM(s) Oral daily  senna 2 Tablet(s) Oral at bedtime    MEDICATIONS  (PRN):  benzocaine 15 mG/menthol 3.6 mG Lozenge 1 Lozenge Oral every 6 hours PRN Sore Throat  dextrose Gel 1 Dose(s) Oral once PRN Blood Glucose LESS THAN 70 milliGRAM(s)/deciLiter  dextrose Gel 1 Dose(s) Oral once PRN Blood Glucose LESS THAN 70 milliGRAM(s)/deciliter  glucagon  Injectable 1 milliGRAM(s) IntraMuscular once PRN Glucose <70 milliGRAM(s)/deciLiter  guaiFENesin    Syrup 100 milliGRAM(s) Oral every 6 hours PRN Cough        Vital Signs Last 24 Hrs  T(C): 36.6 (03 Mar 2018 12:04), Max: 36.7 (03 Mar 2018 04:05)  T(F): 97.9 (03 Mar 2018 12:04), Max: 98 (03 Mar 2018 04:05)  HR: 83 (03 Mar 2018 12:04) (75 - 100)  BP: 109/65 (03 Mar 2018 12:04) (104/63 - 121/60)  BP(mean): --  RR: 19 (03 Mar 2018 12:04) (18 - 19)  SpO2: 92% (03 Mar 2018 12:04) (90% - 99%)      CAPILLARY BLOOD GLUCOSE      POCT Blood Glucose.: 225 mg/dL (03 Mar 2018 12:41)  POCT Blood Glucose.: 138 mg/dL (03 Mar 2018 08:52)  POCT Blood Glucose.: 240 mg/dL (03 Mar 2018 05:57)  POCT Blood Glucose.: 218 mg/dL (02 Mar 2018 21:07)  POCT Blood Glucose.: 297 mg/dL (02 Mar 2018 17:23)      I&O's Detail    02 Mar 2018 07:01  -  03 Mar 2018 07:00  --------------------------------------------------------  IN:    DOBUTamine Infusion: 135.2 mL    Oral Fluid: 300 mL  Total IN: 435.2 mL    OUT:  Total OUT: 0 mL    Total NET: 435.2 mL      03 Mar 2018 07:01  -  03 Mar 2018 13:28  --------------------------------------------------------  IN:    Other: 500 mL  Total IN: 500 mL    OUT:    Other: 2500 mL  Total OUT: 2500 mL    Total NET: -2000 mL                PHYSICAL EXAM:  GENERAL: NAD, frail appearing  HEAD:  Atraumatic, Normocephalic  EYES: EOMI, PERRLA, conjunctiva and sclera clear  NECK: Supple.  CHEST/LUNG: Diffuse crackles on inspiration.  Mild wheeze.  HEART: Afib; No murmurs, rubs, or gallops  ABDOMEN: Soft, Nontender, Nondistended; Bowel sounds present  EXTREMITIES:  2+ Peripheral Pulses, No clubbing, cyanosis, or edema, LUE PICC in place.  PSYCH: Calm  NEUROLOGY: A/Ox3, non-focal  SKIN: No rashes or lesions    LABS:                                              10.6   9.82  )-----------( 123      ( 03 Mar 2018 06:45 )             33.5       03-03    134<L>  |  95<L>  |  62<H>  ----------------------------<  249<H>  3.8   |  24  |  5.31<H>    Ca    8.6      03 Mar 2018 06:45  Mg     1.8     03-03            RADIOLOGY & ADDITIONAL TESTS:    Imaging Personally Reviewed:    Care Discussed with Consultants/Other Providers:

## 2018-03-03 NOTE — PROGRESS NOTE ADULT - PROBLEM SELECTOR PLAN 4
titrate oxygen to keep o2 sat>=90%  c/w dufranco and pulmicort  2/26 began prednisone 20mg po daily  2/28: cont steroids to see how he responds to it in next few days  3/1: cont steroids as well as BD  3/2: cont steroids  3/3:major issue is pulmonary fibrosis: cont current care:

## 2018-03-03 NOTE — PROGRESS NOTE ADULT - PROBLEM SELECTOR PLAN 2
completed course tamiflu for flu b  3/1: finished course of Tamiflu  3/2: Resolved  3/3: resolved: completed Tamiflu

## 2018-03-03 NOTE — PROGRESS NOTE ADULT - PROBLEM SELECTOR PLAN 3
: supportive treatment:  titrate oxygen to keep o2 sat>=90%  2/26 began trial prednisone for sob-prednisone 20 mg po daily  2/28: started on a trial of prednisone two days ago, will monitor, however per his family, he never had a good response to previous trial of steroid.  3/1: on retrial of steroids: Pt thinks his breathing is better then before: Would like to cont steroids at this t alirio  3/2: cont steroids: pt seems to have stable SOB , does not seem to be improving further to me:  3/3: decrease steroids to 15 mg ad ay

## 2018-03-03 NOTE — PROGRESS NOTE ADULT - SUBJECTIVE AND OBJECTIVE BOX
Patient is a 62y old  Male who presents with a chief complaint of SOB x few hours (20 Feb 2018 18:05)      Any change in ROS: pt is doing ok   SOB is same     MEDICATIONS  (STANDING):  ALBUTerol/ipratropium for Nebulization 3 milliLiter(s) Nebulizer every 6 hours  amiodarone    Tablet 100 milliGRAM(s) Oral daily  aspirin enteric coated 81 milliGRAM(s) Oral daily  atorvastatin 80 milliGRAM(s) Oral at bedtime  buDESOnide   90 MICROgram(s) Inhaler 2 Puff(s) Inhalation two times a day  chlorhexidine 4% Liquid 1 Application(s) Topical daily  dextrose 5%. 1000 milliLiter(s) (50 mL/Hr) IV Continuous <Continuous>  dextrose 5%. 1000 milliLiter(s) (50 mL/Hr) IV Continuous <Continuous>  dextrose 50% Injectable 12.5 Gram(s) IV Push once  dextrose 50% Injectable 25 Gram(s) IV Push once  dextrose 50% Injectable 25 Gram(s) IV Push once  DOBUTamine Infusion 7.5 MICROgram(s)/kG/Min (16.942 mL/Hr) IV Continuous <Continuous>  docusate sodium 100 milliGRAM(s) Oral two times a day  finasteride 5 milliGRAM(s) Oral daily  influenza   Vaccine 0.5 milliLiter(s) IntraMuscular once  insulin glargine Injectable (LANTUS) 16 Unit(s) SubCutaneous at bedtime  insulin lispro (HumaLOG) corrective regimen sliding scale   SubCutaneous three times a day before meals  insulin lispro (HumaLOG) corrective regimen sliding scale   SubCutaneous at bedtime  insulin lispro Injectable (HumaLOG) 5 Unit(s) SubCutaneous three times a day before meals  levothyroxine 75 MICROGram(s) Oral daily  midodrine 30 milliGRAM(s) Oral three times a day  MIRACLE MOUTHWASH 30 milliLiter(s) Swish and Spit three times a day  Nephro-lynda 1 Tablet(s) Oral daily  polyethylene glycol 3350 17 Gram(s) Oral daily  predniSONE   Tablet 20 milliGRAM(s) Oral daily  senna 2 Tablet(s) Oral at bedtime    MEDICATIONS  (PRN):  benzocaine 15 mG/menthol 3.6 mG Lozenge 1 Lozenge Oral every 6 hours PRN Sore Throat  dextrose Gel 1 Dose(s) Oral once PRN Blood Glucose LESS THAN 70 milliGRAM(s)/deciLiter  dextrose Gel 1 Dose(s) Oral once PRN Blood Glucose LESS THAN 70 milliGRAM(s)/deciliter  glucagon  Injectable 1 milliGRAM(s) IntraMuscular once PRN Glucose <70 milliGRAM(s)/deciLiter  guaiFENesin    Syrup 100 milliGRAM(s) Oral every 6 hours PRN Cough    Vital Signs Last 24 Hrs  T(C): 36.6 (03 Mar 2018 12:04), Max: 36.7 (03 Mar 2018 04:05)  T(F): 97.9 (03 Mar 2018 12:04), Max: 98 (03 Mar 2018 04:05)  HR: 83 (03 Mar 2018 12:04) (75 - 100)  BP: 109/65 (03 Mar 2018 12:04) (104/63 - 121/60)  BP(mean): --  RR: 19 (03 Mar 2018 12:04) (18 - 19)  SpO2: 92% (03 Mar 2018 12:04) (90% - 99%)    I&O's Summary    02 Mar 2018 07:01  -  03 Mar 2018 07:00  --------------------------------------------------------  IN: 435.2 mL / OUT: 0 mL / NET: 435.2 mL    03 Mar 2018 07:01  -  03 Mar 2018 14:14  --------------------------------------------------------  IN: 500 mL / OUT: 2500 mL / NET: -2000 mL          Physical Exam:   GENERAL: NAD, well-groomed, well-developed  HEENT: BELL/   Atraumatic, Normocephalic  ENMT: No tonsillar erythema, exudates, or enlargement; Moist mucous membranes, Good dentition, No lesions  NECK: Supple, No JVD, Normal thyroid  CHEST/LUNG: Decreased air entry bilaterally   CVS: Regular rate and rhythm; No murmurs, rubs, or gallops  GI: : Soft, Nontender, Nondistended; Bowel sounds present  NERVOUS SYSTEM:  Alert & Oriented X3  EXTREMITIES:  2+ Peripheral Pulses, No clubbing, cyanosis, or edema  LYMPH: No lymphadenopathy noted  SKIN: No rashes or lesions  ENDOCRINOLOGY: No Thyromegaly  PSYCH: Appropriate    Labs:  ABG - ( 02 Mar 2018 10:48 )  pH: 7.38  /  pCO2: 42    /  pO2: 58    / HCO3: 24    / Base Excess: -0.4  /  SaO2: 87.5                            10.6   9.82  )-----------( 123      ( 03 Mar 2018 06:45 )             33.5                         10.5   10.32 )-----------( 120      ( 02 Mar 2018 05:50 )             32.6                         10.6   8.45  )-----------( 121      ( 01 Mar 2018 05:55 )             33.2                         10.7   8.45  )-----------( 124      ( 28 Feb 2018 09:35 )             33.0     03-03    134<L>  |  95<L>  |  62<H>  ----------------------------<  249<H>  3.8   |  24  |  5.31<H>  03-02    133<L>  |  94<L>  |  39<H>  ----------------------------<  287<H>  3.9   |  26  |  3.87<H>  03-01    130<L>  |  92<L>  |  51<H>  ----------------------------<  299<H>  3.8   |  25  |  5.17<H>  02-28    134<L>  |  95<L>  |  35<H>  ----------------------------<  235<H>  4.1   |  26  |  3.92<H>    Ca    8.6      03 Mar 2018 06:45  Ca    8.5      02 Mar 2018 05:50  Mg     1.8     03-03    TPro  7.7  /  Alb  2.9<L>  /  TBili  0.5  /  DBili  x   /  AST  86<H>  /  ALT  84<H>  /  AlkPhos  245<H>  03-01    CAPILLARY BLOOD GLUCOSE      POCT Blood Glucose.: 225 mg/dL (03 Mar 2018 12:41)  POCT Blood Glucose.: 138 mg/dL (03 Mar 2018 08:52)  POCT Blood Glucose.: 240 mg/dL (03 Mar 2018 05:57)  POCT Blood Glucose.: 218 mg/dL (02 Mar 2018 21:07)  POCT Blood Glucose.: 297 mg/dL (02 Mar 2018 17:23)        PT/INR - ( 03 Mar 2018 06:45 )   PT: 24.6 SEC;   INR: 2.11          PTT - ( 02 Mar 2018 05:50 )  PTT:39.1 SEC    Cultures:       < from: Xray Chest 1 View- PORTABLE-Urgent (02.26.18 @ 11:27) >  EXAM:  XR CHEST PORTABLE URGENT 1V        PROCEDURE DATE:  Feb 26 2018         INTERPRETATION:  CLINICAL INFORMATION: Shortness of breath.   Cardiomyopathy.    TECHNIQUE: AP chest radiograph    COMPARISON: Chest radiograph performed 2/17/2018. CT abdomen and pelvis   performed 10/30/2017.    FINDINGS:    A right-sided dialysis catheter terminates over SVC. There is a   left-sided cardiac device. There is redemonstration of diffuse   interstitial and reticular opacities bilaterally, mildly improved since   2/17/2018. There are small bilateral pleural effusions.    IMPRESSION:    Persistent diffuse interstitial and reticular lung opacities bilaterally,   mildly improved since 2/17/2018, which may represent pulmonary edema   superimposed on patient's known interstitial lung disease.    Small bilateral pleural effusions.              ANNIA MURPHY M.D., RADIOLOGY RESIDENT  This document has been electronically signed.  NITIN MAHARAJ M.D. ATTENDING RADIOLOGIST  This document has been electronically signed. Feb 26 2018  2:54PM        < end of copied text >                        Studies  Chest X-RAY  CT SCAN Chest   Venous Dopplers: LE:   CT Abdomen  Others

## 2018-03-03 NOTE — PROGRESS NOTE ADULT - PROBLEM SELECTOR PLAN 1
has underlying pulmonary fibrosis and respiratory failure precipitated by influenza:   Titrate fio2 to keep o2 sat >=90% remains on 50% VM  NIV QHS/PRV  2/28: cont NIV at night and daytime too  3/1: says his breathing is better: would cont to use steroids as well as bipap at night time: Cont oxygen  to keep sao2 above 88%:  3/2: doing reasonable: cont current care:  3/3: decrease  steroids to 15 mg a day

## 2018-03-03 NOTE — PROGRESS NOTE ADULT - PROBLEM SELECTOR PLAN 6
HR controlled  ac as per cards/medicine  management as per cards  3/1: INR is therapeutic:  3/3: INR is therapeutic

## 2018-03-03 NOTE — PROGRESS NOTE ADULT - PROBLEM SELECTOR PLAN 9
Inotropy per cardiology team.  on dobutamine and midodrine  3/1: on dobutamine  3/3: cont current care by primary team

## 2018-03-03 NOTE — PROGRESS NOTE ADULT - SUBJECTIVE AND OBJECTIVE BOX
[facility] - Mark / Khai PADRON /Jennifer/ VITO García/ VITO Leigh/ Kolotn Farr / LISSA Njeru  ---------------------------------------------------------------------------------------------------------------  seen and examined today for End Stage Renal Disease on Dialysis     VITALS:  T(F): 97.9 (03-03-18 @ 12:04), Max: 98 (03-03-18 @ 04:05)  HR: 83 (03-03-18 @ 12:04)  BP: 109/65 (03-03-18 @ 12:04)  RR: 19 (03-03-18 @ 12:04)  SpO2: 92% (03-03-18 @ 12:04)    03-02 @ 07:01  -  03-03 @ 07:00  --------------------------------------------------------  IN: 435.2 mL / OUT: 0 mL / NET: 435.2 mL    Physical Exam :-  Constitutional: NAD  Neck: Supple.  Respiratory: Bilateral equal breath sounds, few Crackles present.  Cardiovascular: S1, S2 normal, positive Murmur  Gastrointestinal: Bowel Sounds present, soft, non tender.  Extremities: no Edema Feet  Neurological: Alert and Oriented x 3,  Psychiatric: Normal mood, normal affect  Data:-  No Known Allergies    Hospital Medications:   MEDICATIONS  (STANDING):  ALBUTerol/ipratropium for Nebulization 3 milliLiter(s) Nebulizer every 6 hours  amiodarone    Tablet 100 milliGRAM(s) Oral daily  aspirin enteric coated 81 milliGRAM(s) Oral daily  atorvastatin 80 milliGRAM(s) Oral at bedtime  buDESOnide   90 MICROgram(s) Inhaler 2 Puff(s) Inhalation two times a day  chlorhexidine 4% Liquid 1 Application(s) Topical daily  dextrose 5%. 1000 milliLiter(s) (50 mL/Hr) IV Continuous <Continuous>  dextrose 5%. 1000 milliLiter(s) (50 mL/Hr) IV Continuous <Continuous>  dextrose 50% Injectable 12.5 Gram(s) IV Push once  dextrose 50% Injectable 25 Gram(s) IV Push once  dextrose 50% Injectable 25 Gram(s) IV Push once  DOBUTamine Infusion 7.5 MICROgram(s)/kG/Min (16.942 mL/Hr) IV Continuous <Continuous>  docusate sodium 100 milliGRAM(s) Oral two times a day  finasteride 5 milliGRAM(s) Oral daily  influenza   Vaccine 0.5 milliLiter(s) IntraMuscular once  insulin glargine Injectable (LANTUS) 16 Unit(s) SubCutaneous at bedtime  insulin lispro (HumaLOG) corrective regimen sliding scale   SubCutaneous three times a day before meals  insulin lispro (HumaLOG) corrective regimen sliding scale   SubCutaneous at bedtime  insulin lispro Injectable (HumaLOG) 5 Unit(s) SubCutaneous three times a day before meals  levothyroxine 75 MICROGram(s) Oral daily  midodrine 30 milliGRAM(s) Oral three times a day  MIRACLE MOUTHWASH 30 milliLiter(s) Swish and Spit three times a day  Nephro-lynda 1 Tablet(s) Oral daily  polyethylene glycol 3350 17 Gram(s) Oral daily  predniSONE   Tablet 20 milliGRAM(s) Oral daily  senna 2 Tablet(s) Oral at bedtime    03-03    134<L>  |  95<L>  |  62<H>  ----------------------------<  249<H>  3.8   |  24  |  5.31<H>    Ca    8.6      03 Mar 2018 06:45  Mg     1.8     03-03      Creatinine Trend: 5.31 <--, 3.87 <--, 5.17 <--, 3.92 <--, 5.08 <--, 7.29 <--, 5.93 <--                        10.6   9.82  )-----------( 123      ( 03 Mar 2018 06:45 )             33.5

## 2018-03-04 LAB
BUN SERPL-MCNC: 55 MG/DL — HIGH (ref 7–23)
CALCIUM SERPL-MCNC: 8.7 MG/DL — SIGNIFICANT CHANGE UP (ref 8.4–10.5)
CHLORIDE SERPL-SCNC: 96 MMOL/L — LOW (ref 98–107)
CO2 SERPL-SCNC: 26 MMOL/L — SIGNIFICANT CHANGE UP (ref 22–31)
CREAT SERPL-MCNC: 4.5 MG/DL — HIGH (ref 0.5–1.3)
GLUCOSE SERPL-MCNC: 308 MG/DL — HIGH (ref 70–99)
HCT VFR BLD CALC: 33.6 % — LOW (ref 39–50)
HGB BLD-MCNC: 10.6 G/DL — LOW (ref 13–17)
INR BLD: 2.11 — HIGH (ref 0.88–1.17)
MAGNESIUM SERPL-MCNC: 1.8 MG/DL — SIGNIFICANT CHANGE UP (ref 1.6–2.6)
MCHC RBC-ENTMCNC: 30.5 PG — SIGNIFICANT CHANGE UP (ref 27–34)
MCHC RBC-ENTMCNC: 31.5 % — LOW (ref 32–36)
MCV RBC AUTO: 96.6 FL — SIGNIFICANT CHANGE UP (ref 80–100)
NRBC # FLD: 0 — SIGNIFICANT CHANGE UP
PLATELET # BLD AUTO: 140 K/UL — LOW (ref 150–400)
PMV BLD: 13 FL — SIGNIFICANT CHANGE UP (ref 7–13)
POTASSIUM SERPL-MCNC: 4.3 MMOL/L — SIGNIFICANT CHANGE UP (ref 3.5–5.3)
POTASSIUM SERPL-SCNC: 4.3 MMOL/L — SIGNIFICANT CHANGE UP (ref 3.5–5.3)
PROTHROM AB SERPL-ACNC: 23.8 SEC — HIGH (ref 9.8–13.1)
RBC # BLD: 3.48 M/UL — LOW (ref 4.2–5.8)
RBC # FLD: 17.9 % — HIGH (ref 10.3–14.5)
SODIUM SERPL-SCNC: 136 MMOL/L — SIGNIFICANT CHANGE UP (ref 135–145)
WBC # BLD: 9.58 K/UL — SIGNIFICANT CHANGE UP (ref 3.8–10.5)
WBC # FLD AUTO: 9.58 K/UL — SIGNIFICANT CHANGE UP (ref 3.8–10.5)

## 2018-03-04 PROCEDURE — 99233 SBSQ HOSP IP/OBS HIGH 50: CPT

## 2018-03-04 RX ORDER — WARFARIN SODIUM 2.5 MG/1
3 TABLET ORAL ONCE
Qty: 0 | Refills: 0 | Status: COMPLETED | OUTPATIENT
Start: 2018-03-04 | End: 2018-03-04

## 2018-03-04 RX ADMIN — Medication 3 MILLILITER(S): at 10:21

## 2018-03-04 RX ADMIN — Medication 5 UNIT(S): at 12:46

## 2018-03-04 RX ADMIN — Medication 5: at 18:11

## 2018-03-04 RX ADMIN — Medication 16.94 MICROGRAM(S)/KG/MIN: at 18:11

## 2018-03-04 RX ADMIN — INSULIN GLARGINE 16 UNIT(S): 100 INJECTION, SOLUTION SUBCUTANEOUS at 23:04

## 2018-03-04 RX ADMIN — DIPHENHYDRAMINE HYDROCHLORIDE AND LIDOCAINE HYDROCHLORIDE AND ALUMINUM HYDROXIDE AND MAGNESIUM HYDRO 30 MILLILITER(S): KIT at 23:09

## 2018-03-04 RX ADMIN — SENNA PLUS 2 TABLET(S): 8.6 TABLET ORAL at 23:05

## 2018-03-04 RX ADMIN — Medication 100 MILLIGRAM(S): at 18:12

## 2018-03-04 RX ADMIN — Medication 16.94 MICROGRAM(S)/KG/MIN: at 23:06

## 2018-03-04 RX ADMIN — Medication 4: at 09:13

## 2018-03-04 RX ADMIN — MIDODRINE HYDROCHLORIDE 30 MILLIGRAM(S): 2.5 TABLET ORAL at 23:05

## 2018-03-04 RX ADMIN — MIDODRINE HYDROCHLORIDE 30 MILLIGRAM(S): 2.5 TABLET ORAL at 14:49

## 2018-03-04 RX ADMIN — Medication 5 UNIT(S): at 18:11

## 2018-03-04 RX ADMIN — Medication 3 MILLILITER(S): at 03:53

## 2018-03-04 RX ADMIN — MIDODRINE HYDROCHLORIDE 30 MILLIGRAM(S): 2.5 TABLET ORAL at 05:31

## 2018-03-04 RX ADMIN — Medication 16.94 MICROGRAM(S)/KG/MIN: at 05:30

## 2018-03-04 RX ADMIN — Medication 100 MILLIGRAM(S): at 05:31

## 2018-03-04 RX ADMIN — DIPHENHYDRAMINE HYDROCHLORIDE AND LIDOCAINE HYDROCHLORIDE AND ALUMINUM HYDROXIDE AND MAGNESIUM HYDRO 30 MILLILITER(S): KIT at 14:51

## 2018-03-04 RX ADMIN — WARFARIN SODIUM 3 MILLIGRAM(S): 2.5 TABLET ORAL at 18:19

## 2018-03-04 RX ADMIN — Medication 3 MILLILITER(S): at 15:55

## 2018-03-04 RX ADMIN — AMIODARONE HYDROCHLORIDE 100 MILLIGRAM(S): 400 TABLET ORAL at 05:30

## 2018-03-04 RX ADMIN — Medication 2 PUFF(S): at 12:45

## 2018-03-04 RX ADMIN — ATORVASTATIN CALCIUM 80 MILLIGRAM(S): 80 TABLET, FILM COATED ORAL at 23:05

## 2018-03-04 RX ADMIN — CHLORHEXIDINE GLUCONATE 1 APPLICATION(S): 213 SOLUTION TOPICAL at 12:47

## 2018-03-04 RX ADMIN — Medication 5 UNIT(S): at 09:13

## 2018-03-04 RX ADMIN — Medication 16.94 MICROGRAM(S)/KG/MIN: at 12:47

## 2018-03-04 RX ADMIN — Medication 81 MILLIGRAM(S): at 12:46

## 2018-03-04 RX ADMIN — Medication 15 MILLIGRAM(S): at 05:30

## 2018-03-04 RX ADMIN — FINASTERIDE 5 MILLIGRAM(S): 5 TABLET, FILM COATED ORAL at 12:46

## 2018-03-04 RX ADMIN — Medication 3 MILLILITER(S): at 21:29

## 2018-03-04 RX ADMIN — Medication 2 PUFF(S): at 23:04

## 2018-03-04 RX ADMIN — Medication 1: at 23:04

## 2018-03-04 RX ADMIN — Medication 1 TABLET(S): at 14:49

## 2018-03-04 RX ADMIN — DIPHENHYDRAMINE HYDROCHLORIDE AND LIDOCAINE HYDROCHLORIDE AND ALUMINUM HYDROXIDE AND MAGNESIUM HYDRO 30 MILLILITER(S): KIT at 05:32

## 2018-03-04 RX ADMIN — Medication 5: at 12:46

## 2018-03-04 RX ADMIN — Medication 75 MICROGRAM(S): at 05:31

## 2018-03-04 NOTE — PROGRESS NOTE ADULT - PROBLEM SELECTOR PLAN 9
Inotropy per cardiology team.  on dobutamine and midodrine  3/1: on dobutamine  3/3: cont current care by primary team  3/4: on dobutaime:

## 2018-03-04 NOTE — PROGRESS NOTE ADULT - SUBJECTIVE AND OBJECTIVE BOX
Patient is a 62y old  Male who presents with a chief complaint of SOB x few hours (20 Feb 2018 18:05)      Any change in ROS: pt is worried about him retention of fluid     MEDICATIONS  (STANDING):  ALBUTerol/ipratropium for Nebulization 3 milliLiter(s) Nebulizer every 6 hours  amiodarone    Tablet 100 milliGRAM(s) Oral daily  aspirin enteric coated 81 milliGRAM(s) Oral daily  atorvastatin 80 milliGRAM(s) Oral at bedtime  buDESOnide   90 MICROgram(s) Inhaler 2 Puff(s) Inhalation two times a day  chlorhexidine 4% Liquid 1 Application(s) Topical daily  dextrose 5%. 1000 milliLiter(s) (50 mL/Hr) IV Continuous <Continuous>  dextrose 5%. 1000 milliLiter(s) (50 mL/Hr) IV Continuous <Continuous>  dextrose 50% Injectable 12.5 Gram(s) IV Push once  dextrose 50% Injectable 25 Gram(s) IV Push once  dextrose 50% Injectable 25 Gram(s) IV Push once  DOBUTamine Infusion 7.5 MICROgram(s)/kG/Min (16.942 mL/Hr) IV Continuous <Continuous>  docusate sodium 100 milliGRAM(s) Oral two times a day  finasteride 5 milliGRAM(s) Oral daily  influenza   Vaccine 0.5 milliLiter(s) IntraMuscular once  insulin glargine Injectable (LANTUS) 16 Unit(s) SubCutaneous at bedtime  insulin lispro (HumaLOG) corrective regimen sliding scale   SubCutaneous three times a day before meals  insulin lispro (HumaLOG) corrective regimen sliding scale   SubCutaneous at bedtime  insulin lispro Injectable (HumaLOG) 5 Unit(s) SubCutaneous three times a day before meals  levothyroxine 75 MICROGram(s) Oral daily  midodrine 30 milliGRAM(s) Oral three times a day  MIRACLE MOUTHWASH 30 milliLiter(s) Swish and Spit three times a day  Nephro-lynda 1 Tablet(s) Oral daily  polyethylene glycol 3350 17 Gram(s) Oral daily  predniSONE   Tablet 15 milliGRAM(s) Oral daily  senna 2 Tablet(s) Oral at bedtime  warfarin 3 milliGRAM(s) Oral once    MEDICATIONS  (PRN):  benzocaine 15 mG/menthol 3.6 mG Lozenge 1 Lozenge Oral every 6 hours PRN Sore Throat  dextrose Gel 1 Dose(s) Oral once PRN Blood Glucose LESS THAN 70 milliGRAM(s)/deciLiter  dextrose Gel 1 Dose(s) Oral once PRN Blood Glucose LESS THAN 70 milliGRAM(s)/deciliter  glucagon  Injectable 1 milliGRAM(s) IntraMuscular once PRN Glucose <70 milliGRAM(s)/deciLiter  guaiFENesin    Syrup 100 milliGRAM(s) Oral every 6 hours PRN Cough    Vital Signs Last 24 Hrs  T(C): 36.9 (04 Mar 2018 09:30), Max: 36.9 (04 Mar 2018 09:30)  T(F): 98.5 (04 Mar 2018 09:30), Max: 98.5 (04 Mar 2018 09:30)  HR: 88 (04 Mar 2018 10:21) (76 - 88)  BP: 110/59 (04 Mar 2018 09:30) (104/64 - 111/66)  BP(mean): --  RR: 18 (04 Mar 2018 09:30) (18 - 18)  SpO2: 95% (04 Mar 2018 10:21) (91% - 97%)    I&O's Summary    03 Mar 2018 07:01  -  04 Mar 2018 07:00  --------------------------------------------------------  IN: 500 mL / OUT: 2500 mL / NET: -2000 mL          Physical Exam:   GENERAL: NAD, well-groomed, well-developed  HEENT: BELL/   Atraumatic, Normocephalic  ENMT: No tonsillar erythema, exudates, or enlargement; Moist mucous membranes, Good dentition, No lesions  NECK: Supple, No JVD, Normal thyroid  CHEST/LUNG: scattered crackles :   CVS: Regular rate and rhythm; No murmurs, rubs, or gallops  GI: : Soft, Nontender, Nondistended; Bowel sounds present  NERVOUS SYSTEM:  Alert & Oriented X3  EXTREMITIES:  2+ edema  LYMPH: No lymphadenopathy noted  SKIN: No rashes or lesions  ENDOCRINOLOGY: No Thyromegaly  PSYCH: Appropriate    Labs:                              10.6   9.58  )-----------( 140      ( 04 Mar 2018 05:40 )             33.6                         10.6   9.82  )-----------( 123      ( 03 Mar 2018 06:45 )             33.5                         10.5   10.32 )-----------( 120      ( 02 Mar 2018 05:50 )             32.6                         10.6   8.45  )-----------( 121      ( 01 Mar 2018 05:55 )             33.2     03-04    136  |  96<L>  |  55<H>  ----------------------------<  308<H>  4.3   |  26  |  4.50<H>  03-03    134<L>  |  95<L>  |  62<H>  ----------------------------<  249<H>  3.8   |  24  |  5.31<H>  03-02    133<L>  |  94<L>  |  39<H>  ----------------------------<  287<H>  3.9   |  26  |  3.87<H>  03-01    130<L>  |  92<L>  |  51<H>  ----------------------------<  299<H>  3.8   |  25  |  5.17<H>    Ca    8.7      04 Mar 2018 05:40  Ca    8.6      03 Mar 2018 06:45  Mg     1.8     03-04  Mg     1.8     03-03    TPro  7.7  /  Alb  2.9<L>  /  TBili  0.5  /  DBili  x   /  AST  86<H>  /  ALT  84<H>  /  AlkPhos  245<H>  03-01    CAPILLARY BLOOD GLUCOSE      POCT Blood Glucose.: 354 mg/dL (04 Mar 2018 12:37)  POCT Blood Glucose.: 328 mg/dL (04 Mar 2018 08:54)  POCT Blood Glucose.: 235 mg/dL (03 Mar 2018 23:17)  POCT Blood Glucose.: 227 mg/dL (03 Mar 2018 22:04)  POCT Blood Glucose.: 333 mg/dL (03 Mar 2018 18:36)  POCT Blood Glucose.: 327 mg/dL (03 Mar 2018 18:06)  POCT Blood Glucose.: 452 mg/dL (03 Mar 2018 17:45)        PT/INR - ( 04 Mar 2018 05:40 )   PT: 23.8 SEC;   INR: 2.11              Cultures:         < from: Xray Chest 1 View- PORTABLE-Urgent (02.26.18 @ 11:27) >    INTERPRETATION:  CLINICAL INFORMATION: Shortness of breath.   Cardiomyopathy.    TECHNIQUE: AP chest radiograph    COMPARISON: Chest radiograph performed 2/17/2018. CT abdomen and pelvis   performed 10/30/2017.    FINDINGS:    A right-sided dialysis catheter terminates over SVC. There is a   left-sided cardiac device. There is redemonstration of diffuse   interstitial and reticular opacities bilaterally, mildly improved since   2/17/2018. There are small bilateral pleural effusions.    IMPRESSION:    Persistent diffuse interstitial and reticular lung opacities bilaterally,   mildly improved since 2/17/2018, which may represent pulmonary edema   superimposed on patient's known interstitial lung disease.    Small bilateral pleural effusions.              ANNIA MURPHY M.D., RADIOLOGY RESIDENT  This document has been electronically signed.  NITIN MAHARAJ M.D. ATTENDING RADIOLOGIST  This document has been electronically signed. Feb 26 2018  2:54PM        < end of copied text >                      Studies  Chest X-RAY  CT SCAN Chest   Venous Dopplers: LE:   CT Abdomen  Others

## 2018-03-04 NOTE — PROGRESS NOTE ADULT - PROBLEM SELECTOR PLAN 4
titrate oxygen to keep o2 sat>=90%  c/w dufranco and pulmicort  2/26 began prednisone 20mg po daily  2/28: cont steroids to see how he responds to it in next few days  3/1: cont steroids as well as BD  3/2: cont steroids  3/3:major issue is pulmonary fibrosis: cont current care:  3/14: cont current care:

## 2018-03-04 NOTE — PROGRESS NOTE ADULT - PROBLEM SELECTOR PLAN 3
: supportive treatment:  titrate oxygen to keep o2 sat>=90%  2/26 began trial prednisone for sob-prednisone 20 mg po daily  2/28: started on a trial of prednisone two days ago, will monitor, however per his family, he never had a good response to previous trial of steroid.  3/1: on retrial of steroids: Pt thinks his breathing is better then before: Would like to cont steroids at this t alirio  3/2: cont steroids: pt seems to have stable SOB , does not seem to be improving further to me:  3/3: decrease steroids to 15 mg ad ay  3/4: on 156 mg today , decrease to 10 mg a day

## 2018-03-04 NOTE — PROGRESS NOTE ADULT - SUBJECTIVE AND OBJECTIVE BOX
CC: F/U for respiratory failure    patient seen and examine at bed side  no acute issue  off bipap  still requiring nasal cannula   comfortable   on dobutamine drip           MEDICATIONS  (STANDING):  ALBUTerol/ipratropium for Nebulization 3 milliLiter(s) Nebulizer every 6 hours  amiodarone    Tablet 100 milliGRAM(s) Oral daily  aspirin enteric coated 81 milliGRAM(s) Oral daily  atorvastatin 80 milliGRAM(s) Oral at bedtime  buDESOnide   90 MICROgram(s) Inhaler 2 Puff(s) Inhalation two times a day  chlorhexidine 4% Liquid 1 Application(s) Topical daily  dextrose 5%. 1000 milliLiter(s) (50 mL/Hr) IV Continuous <Continuous>  dextrose 5%. 1000 milliLiter(s) (50 mL/Hr) IV Continuous <Continuous>  dextrose 50% Injectable 12.5 Gram(s) IV Push once  dextrose 50% Injectable 25 Gram(s) IV Push once  dextrose 50% Injectable 25 Gram(s) IV Push once  DOBUTamine Infusion 7.5 MICROgram(s)/kG/Min (16.942 mL/Hr) IV Continuous <Continuous>  docusate sodium 100 milliGRAM(s) Oral two times a day  finasteride 5 milliGRAM(s) Oral daily  influenza   Vaccine 0.5 milliLiter(s) IntraMuscular once  insulin glargine Injectable (LANTUS) 16 Unit(s) SubCutaneous at bedtime  insulin lispro (HumaLOG) corrective regimen sliding scale   SubCutaneous three times a day before meals  insulin lispro (HumaLOG) corrective regimen sliding scale   SubCutaneous at bedtime  insulin lispro Injectable (HumaLOG) 5 Unit(s) SubCutaneous three times a day before meals  levothyroxine 75 MICROGram(s) Oral daily  midodrine 30 milliGRAM(s) Oral three times a day  MIRACLE MOUTHWASH 30 milliLiter(s) Swish and Spit three times a day  Nephro-lynda 1 Tablet(s) Oral daily  polyethylene glycol 3350 17 Gram(s) Oral daily  predniSONE   Tablet 15 milliGRAM(s) Oral daily  senna 2 Tablet(s) Oral at bedtime  warfarin 3 milliGRAM(s) Oral once    MEDICATIONS  (PRN):  benzocaine 15 mG/menthol 3.6 mG Lozenge 1 Lozenge Oral every 6 hours PRN Sore Throat  dextrose Gel 1 Dose(s) Oral once PRN Blood Glucose LESS THAN 70 milliGRAM(s)/deciLiter  dextrose Gel 1 Dose(s) Oral once PRN Blood Glucose LESS THAN 70 milliGRAM(s)/deciliter  glucagon  Injectable 1 milliGRAM(s) IntraMuscular once PRN Glucose <70 milliGRAM(s)/deciLiter  guaiFENesin    Syrup 100 milliGRAM(s) Oral every 6 hours PRN Cough        Vital Signs Last 24 Hrs  T(C): 36.9 (04 Mar 2018 09:30), Max: 36.9 (04 Mar 2018 09:30)  T(F): 98.5 (04 Mar 2018 09:30), Max: 98.5 (04 Mar 2018 09:30)  HR: 88 (04 Mar 2018 10:21) (76 - 88)  BP: 110/59 (04 Mar 2018 09:30) (104/64 - 111/66)  BP(mean): --  RR: 18 (04 Mar 2018 09:30) (18 - 18)  SpO2: 95% (04 Mar 2018 10:21) (91% - 97%)    CAPILLARY BLOOD GLUCOSE    CAPILLARY BLOOD GLUCOSE      POCT Blood Glucose.: 354 mg/dL (04 Mar 2018 12:37)  POCT Blood Glucose.: 328 mg/dL (04 Mar 2018 08:54)  POCT Blood Glucose.: 235 mg/dL (03 Mar 2018 23:17)  POCT Blood Glucose.: 227 mg/dL (03 Mar 2018 22:04)  POCT Blood Glucose.: 333 mg/dL (03 Mar 2018 18:36)  POCT Blood Glucose.: 327 mg/dL (03 Mar 2018 18:06)  POCT Blood Glucose.: 452 mg/dL (03 Mar 2018 17:45)        I&O's Summary    03 Mar 2018 07:01  -  04 Mar 2018 07:00  --------------------------------------------------------  IN: 500 mL / OUT: 2500 mL / NET: -2000 mL                  PHYSICAL EXAM:  GENERAL: NAD, frail appearing  HEAD:  Atraumatic, Normocephalic  EYES: EOMI, PERRLA, conjunctiva and sclera clear  NECK: Supple.  CHEST/LUNG: Diffuse crackles on inspiration.  Mild wheeze.  HEART: Afib; No murmurs, rubs, or gallops  ABDOMEN: Soft, Nontender, Nondistended; Bowel sounds present  EXTREMITIES:  2+ Peripheral Pulses, No clubbing, cyanosis, or edema, LUE PICC in place.  PSYCH: Calm  NEUROLOGY: A/Ox3, non-focal  SKIN: No rashes or lesions    LABS:                                                         10.6   9.58  )-----------( 140      ( 04 Mar 2018 05:40 )             33.6       03-04    136  |  96<L>  |  55<H>  ----------------------------<  308<H>  4.3   |  26  |  4.50<H>    Ca    8.7      04 Mar 2018 05:40  Mg     1.8     03-04      PT/INR - ( 04 Mar 2018 05:40 )   PT: 23.8 SEC;   INR: 2.11                    RADIOLOGY & ADDITIONAL TESTS:    Imaging Personally Reviewed:    Care Discussed with Consultants/Other Providers:

## 2018-03-04 NOTE — PROGRESS NOTE ADULT - PROBLEM SELECTOR PLAN 1
has underlying pulmonary fibrosis and respiratory failure precipitated by influenza:   Titrate fio2 to keep o2 sat >=90% remains on 50% VM  NIV QHS/PRV  2/28: cont NIV at night and daytime too  3/1: says his breathing is better: would cont to use steroids as well as bipap at night time: Cont oxygen  to keep sao2 above 88%:  3/2: doing reasonable: cont current care:  3/3: decrease  steroids to 15 mg a day  3/4: given risk of increasing retention of salt and water , and he has been on dobutamine: would cut it down to 10 mg tomorrow and slowly taper it to off

## 2018-03-04 NOTE — PROGRESS NOTE ADULT - SUBJECTIVE AND OBJECTIVE BOX
Respiratory status is slightly improved with steroids  About "70% back to baseline"  /59  HR 82  O2 Sat 95% on NC  Bilateral crackles  RR 1/6 systolic murmur  1-2+ edema    INR 2.11    Imp: Severe non-ischemic cardiomyopathy with ICD and on Dobutamine @ 7.5 mcg/kg/min  Would taper off the steroids as not clear he is getting any further benefit.  Would benefit from slightly more fluid removal with HD.

## 2018-03-04 NOTE — PROGRESS NOTE ADULT - ASSESSMENT
62 M PMH ESRD on HD, NICM with severe systolic dysfunction (EF 21%), on chronic dobutamine gtt, also with AICD, chronic hypotension on midodrine, atrial fibrillation on warfarin, COPD, pulmonary fibrosis on home O2, initially presented with acute on chronic respiratory failure in setting of influenza B infection, also with exacerbation of chronic hypoxic respiratory failure. He was managed in the CCU on norepinephrine - transition to dobutamine/midodrine.    1.  Acute on chronic respiratory failure with hypoxia.     due to influenza and underlying COPD, pulmonary fibrosis. s/p course of Tamiflu and CCU.  continue oxygen   taper steroids      2. Chronic systolic heart failure.    s/p AICD and on chronic dobutamine. Patient chronically hypervolemic.  -c/w dobutamine infusion - HD/UF per renal.     3. Chronic hypotension.     BP stable and at goal. -c/w midodrine and dobutamine gtt.   f/u with cardio     4.Type 2 diabetes mellitus with end-stage renal disease.    sliding scale       5.Chronic obstructive pulmonary disease, unspecified COPD type.     Reduced breath sounds throughout. Pulmonology following. on prednisone 20mg daily. c/w Pulmicort.     6.  Pulmonary fibrosis.   c/w supportive care, supplemental oxygen.  taper on streroids      7.  Atrial fibrillation, unspecified type.    On warfarin. Dose coumadin. Monitor INR. c/w amiodarone.   8. Hypothyroidism, unspecified type.     c/w levothyroxine daily.   9. renal failure   s/p withe jeremy         Gi and dvt prophylaxis

## 2018-03-05 DIAGNOSIS — Z71.89 OTHER SPECIFIED COUNSELING: ICD-10-CM

## 2018-03-05 DIAGNOSIS — I50.23 ACUTE ON CHRONIC SYSTOLIC (CONGESTIVE) HEART FAILURE: ICD-10-CM

## 2018-03-05 LAB
BUN SERPL-MCNC: 73 MG/DL — HIGH (ref 7–23)
CALCIUM SERPL-MCNC: 8.9 MG/DL — SIGNIFICANT CHANGE UP (ref 8.4–10.5)
CHLORIDE SERPL-SCNC: 93 MMOL/L — LOW (ref 98–107)
CO2 SERPL-SCNC: 24 MMOL/L — SIGNIFICANT CHANGE UP (ref 22–31)
CREAT SERPL-MCNC: 5.67 MG/DL — HIGH (ref 0.5–1.3)
GLUCOSE SERPL-MCNC: 155 MG/DL — HIGH (ref 70–99)
HCT VFR BLD CALC: 32.8 % — LOW (ref 39–50)
HGB BLD-MCNC: 10.7 G/DL — LOW (ref 13–17)
INR BLD: 2.16 — HIGH (ref 0.88–1.17)
MAGNESIUM SERPL-MCNC: 1.8 MG/DL — SIGNIFICANT CHANGE UP (ref 1.6–2.6)
MCHC RBC-ENTMCNC: 32 PG — SIGNIFICANT CHANGE UP (ref 27–34)
MCHC RBC-ENTMCNC: 32.6 % — SIGNIFICANT CHANGE UP (ref 32–36)
MCV RBC AUTO: 98.2 FL — SIGNIFICANT CHANGE UP (ref 80–100)
NRBC # FLD: 0 — SIGNIFICANT CHANGE UP
PLATELET # BLD AUTO: 133 K/UL — LOW (ref 150–400)
PMV BLD: 13.1 FL — HIGH (ref 7–13)
POTASSIUM SERPL-MCNC: 4.4 MMOL/L — SIGNIFICANT CHANGE UP (ref 3.5–5.3)
POTASSIUM SERPL-SCNC: 4.4 MMOL/L — SIGNIFICANT CHANGE UP (ref 3.5–5.3)
PROTHROM AB SERPL-ACNC: 25.2 SEC — HIGH (ref 9.8–13.1)
RBC # BLD: 3.34 M/UL — LOW (ref 4.2–5.8)
RBC # FLD: 17.9 % — HIGH (ref 10.3–14.5)
SODIUM SERPL-SCNC: 133 MMOL/L — LOW (ref 135–145)
WBC # BLD: 10.36 K/UL — SIGNIFICANT CHANGE UP (ref 3.8–10.5)
WBC # FLD AUTO: 10.36 K/UL — SIGNIFICANT CHANGE UP (ref 3.8–10.5)

## 2018-03-05 PROCEDURE — 99233 SBSQ HOSP IP/OBS HIGH 50: CPT

## 2018-03-05 RX ORDER — DOBUTAMINE HCL 250MG/20ML
7.5 VIAL (ML) INTRAVENOUS
Qty: 1 | Refills: 0 | OUTPATIENT
Start: 2018-03-05 | End: 2018-04-03

## 2018-03-05 RX ORDER — INSULIN GLARGINE 100 [IU]/ML
18 INJECTION, SOLUTION SUBCUTANEOUS AT BEDTIME
Qty: 0 | Refills: 0 | Status: DISCONTINUED | OUTPATIENT
Start: 2018-03-05 | End: 2018-03-09

## 2018-03-05 RX ORDER — INSULIN LISPRO 100/ML
8 VIAL (ML) SUBCUTANEOUS
Qty: 0 | Refills: 0 | Status: DISCONTINUED | OUTPATIENT
Start: 2018-03-05 | End: 2018-03-07

## 2018-03-05 RX ORDER — DOBUTAMINE HCL 250MG/20ML
7.5 VIAL (ML) INTRAVENOUS
Qty: 500 | Refills: 0 | Status: DISCONTINUED | OUTPATIENT
Start: 2018-03-05 | End: 2018-03-23

## 2018-03-05 RX ORDER — SALIVA SUBSTITUTE COMB NO.11 351 MG
30 POWDER IN PACKET (EA) MUCOUS MEMBRANE EVERY 6 HOURS
Qty: 0 | Refills: 0 | Status: DISCONTINUED | OUTPATIENT
Start: 2018-03-05 | End: 2018-03-23

## 2018-03-05 RX ORDER — WARFARIN SODIUM 2.5 MG/1
3 TABLET ORAL ONCE
Qty: 0 | Refills: 0 | Status: COMPLETED | OUTPATIENT
Start: 2018-03-05 | End: 2018-03-05

## 2018-03-05 RX ORDER — SALIVA SUBSTITUTE COMB NO.11 351 MG
1 POWDER IN PACKET (EA) MUCOUS MEMBRANE EVERY 6 HOURS
Qty: 0 | Refills: 0 | Status: DISCONTINUED | OUTPATIENT
Start: 2018-03-05 | End: 2018-03-05

## 2018-03-05 RX ADMIN — Medication 16.94 MICROGRAM(S)/KG/MIN: at 09:33

## 2018-03-05 RX ADMIN — Medication 3 MILLILITER(S): at 10:20

## 2018-03-05 RX ADMIN — ATORVASTATIN CALCIUM 80 MILLIGRAM(S): 80 TABLET, FILM COATED ORAL at 22:35

## 2018-03-05 RX ADMIN — Medication 2: at 19:46

## 2018-03-05 RX ADMIN — Medication 10 MILLIGRAM(S): at 06:36

## 2018-03-05 RX ADMIN — Medication 2 PUFF(S): at 22:35

## 2018-03-05 RX ADMIN — Medication 1 TABLET(S): at 13:14

## 2018-03-05 RX ADMIN — MIDODRINE HYDROCHLORIDE 30 MILLIGRAM(S): 2.5 TABLET ORAL at 05:15

## 2018-03-05 RX ADMIN — Medication 3 MILLILITER(S): at 21:54

## 2018-03-05 RX ADMIN — Medication 100 MILLIGRAM(S): at 22:35

## 2018-03-05 RX ADMIN — WARFARIN SODIUM 3 MILLIGRAM(S): 2.5 TABLET ORAL at 22:35

## 2018-03-05 RX ADMIN — POLYETHYLENE GLYCOL 3350 17 GRAM(S): 17 POWDER, FOR SOLUTION ORAL at 13:15

## 2018-03-05 RX ADMIN — Medication 5 UNIT(S): at 09:31

## 2018-03-05 RX ADMIN — DIPHENHYDRAMINE HYDROCHLORIDE AND LIDOCAINE HYDROCHLORIDE AND ALUMINUM HYDROXIDE AND MAGNESIUM HYDRO 30 MILLILITER(S): KIT at 05:17

## 2018-03-05 RX ADMIN — Medication 3 MILLILITER(S): at 04:01

## 2018-03-05 RX ADMIN — MIDODRINE HYDROCHLORIDE 30 MILLIGRAM(S): 2.5 TABLET ORAL at 13:14

## 2018-03-05 RX ADMIN — Medication 100 MILLIGRAM(S): at 05:14

## 2018-03-05 RX ADMIN — Medication 2 PUFF(S): at 09:31

## 2018-03-05 RX ADMIN — FINASTERIDE 5 MILLIGRAM(S): 5 TABLET, FILM COATED ORAL at 13:26

## 2018-03-05 RX ADMIN — Medication 75 MICROGRAM(S): at 05:15

## 2018-03-05 RX ADMIN — SENNA PLUS 2 TABLET(S): 8.6 TABLET ORAL at 22:35

## 2018-03-05 RX ADMIN — DIPHENHYDRAMINE HYDROCHLORIDE AND LIDOCAINE HYDROCHLORIDE AND ALUMINUM HYDROXIDE AND MAGNESIUM HYDRO 30 MILLILITER(S): KIT at 22:41

## 2018-03-05 RX ADMIN — Medication 1: at 09:31

## 2018-03-05 RX ADMIN — Medication 16.94 MICROGRAM(S)/KG/MIN: at 10:09

## 2018-03-05 RX ADMIN — DIPHENHYDRAMINE HYDROCHLORIDE AND LIDOCAINE HYDROCHLORIDE AND ALUMINUM HYDROXIDE AND MAGNESIUM HYDRO 30 MILLILITER(S): KIT at 13:24

## 2018-03-05 RX ADMIN — Medication 81 MILLIGRAM(S): at 13:26

## 2018-03-05 RX ADMIN — INSULIN GLARGINE 18 UNIT(S): 100 INJECTION, SOLUTION SUBCUTANEOUS at 22:35

## 2018-03-05 RX ADMIN — Medication 8 UNIT(S): at 19:45

## 2018-03-05 RX ADMIN — AMIODARONE HYDROCHLORIDE 100 MILLIGRAM(S): 400 TABLET ORAL at 05:14

## 2018-03-05 RX ADMIN — Medication 5 UNIT(S): at 13:12

## 2018-03-05 RX ADMIN — Medication 5: at 13:12

## 2018-03-05 RX ADMIN — Medication 16.94 MICROGRAM(S)/KG/MIN: at 22:34

## 2018-03-05 RX ADMIN — MIDODRINE HYDROCHLORIDE 30 MILLIGRAM(S): 2.5 TABLET ORAL at 22:35

## 2018-03-05 NOTE — PROGRESS NOTE ADULT - PROBLEM SELECTOR PLAN 3
: supportive treatment:  titrate oxygen to keep o2 sat>=90%  2/26 began trial prednisone for sob-prednisone 20 mg po daily  2/28: started on a trial of prednisone two days ago, will monitor, however per his family, he never had a good response to previous trial of steroid.  3/1: on retrial of steroids: Pt thinks his breathing is better then before: Would like to cont steroids at this t alirio  3/2: cont steroids: pt seems to have stable SOB , does not seem to be improving further to me:  3/3: decrease steroids to 15 mg ad ay  3/4: on 15 mg today , decrease to 10 mg a day  3/5: taper off prednisone: already ordered

## 2018-03-05 NOTE — PROGRESS NOTE ADULT - SUBJECTIVE AND OBJECTIVE BOX
He is hunched over and fatigued.    He reports thirst and dry mouth,     His two sisters were there: Berenice and Tessie, as was his son             T(C): 36.5 (03-05-18 @ 10:14), Max: 37.1 (03-04-18 @ 14:55)  HR: 98 (03-05-18 @ 13:00) (70 - 900)  BP: 123/80 (03-05-18 @ 13:00) (104/61 - 123/80)  RR: 18 (03-05-18 @ 13:00) (18 - 18)  SpO2: 93% (03-05-18 @ 10:14) (91% - 96%)  Wt(kg): --        CAPILLARY BLOOD GLUCOSE      POCT Blood Glucose.: 353 mg/dL (05 Mar 2018 12:56)  POCT Blood Glucose.: 191 mg/dL (05 Mar 2018 09:05)  POCT Blood Glucose.: 257 mg/dL (04 Mar 2018 22:05)  POCT Blood Glucose.: 366 mg/dL (04 Mar 2018 17:55)        ALBUTerol/ipratropium for Nebulization 3 milliLiter(s) Nebulizer every 6 hours  amiodarone    Tablet 100 milliGRAM(s) Oral daily  aspirin enteric coated 81 milliGRAM(s) Oral daily  atorvastatin 80 milliGRAM(s) Oral at bedtime  benzocaine 15 mG/menthol 3.6 mG Lozenge 1 Lozenge Oral every 6 hours PRN  buDESOnide   90 MICROgram(s) Inhaler 2 Puff(s) Inhalation two times a day  chlorhexidine 4% Liquid 1 Application(s) Topical daily  dextrose 5%. 1000 milliLiter(s) IV Continuous <Continuous>  dextrose 5%. 1000 milliLiter(s) IV Continuous <Continuous>  dextrose 50% Injectable 12.5 Gram(s) IV Push once  dextrose 50% Injectable 25 Gram(s) IV Push once  dextrose 50% Injectable 25 Gram(s) IV Push once  dextrose Gel 1 Dose(s) Oral once PRN  dextrose Gel 1 Dose(s) Oral once PRN  docusate sodium 100 milliGRAM(s) Oral two times a day  finasteride 5 milliGRAM(s) Oral daily  glucagon  Injectable 1 milliGRAM(s) IntraMuscular once PRN  guaiFENesin    Syrup 100 milliGRAM(s) Oral every 6 hours PRN  influenza   Vaccine 0.5 milliLiter(s) IntraMuscular once  insulin glargine Injectable (LANTUS) 16 Unit(s) SubCutaneous at bedtime  insulin lispro (HumaLOG) corrective regimen sliding scale   SubCutaneous three times a day before meals  insulin lispro (HumaLOG) corrective regimen sliding scale   SubCutaneous at bedtime  insulin lispro Injectable (HumaLOG) 5 Unit(s) SubCutaneous three times a day before meals  levothyroxine 75 MICROGram(s) Oral daily  midodrine 30 milliGRAM(s) Oral three times a day  MIRACLE MOUTHWASH 30 milliLiter(s) Swish and Spit three times a day  Nephro-lynda 1 Tablet(s) Oral daily  polyethylene glycol 3350 17 Gram(s) Oral daily  senna 2 Tablet(s) Oral at bedtime          03-05    133<L>  |  93<L>  |  73<H>  ----------------------------<  155<H>  4.4   |  24  |  5.67<H>    Ca    8.9      05 Mar 2018 04:32  Mg     1.8     03-05        Procalc  BNP  ABG                          10.7   10.36 )-----------( 133      ( 05 Mar 2018 04:32 )             32.8   PT/INR - ( 05 Mar 2018 04:32 )   PT: 25.2 SEC;   INR: 2.16                  blood and urine cultures                  PERTINENT PMH REVIEWED:  [x ] YES [ ] NO           SOCIAL HISTORY:  Significant other/partner:  [x ] YES  [ ] NO            Children:  [ x] YES  [ ] NO                   Congregational/Spirituality: Church  Substance hx:  [ ] YES   [ x] NO           Tobacco hx:  [x ] YES  [ ] NO             Alcohol hx: [ ] YES  [x ] NO        Home Opioid hx:  [ ] YES  [ x] NO   Living Situation: x[ ] Home  [ ] Long term care  [ ] Rehab    REFERRALS:   [x ] Chaplaincy  [ ] Hospice  [ ] Child Life  [ ] Social Work  [ ] Case management [ ] Holistic Therapy     FAMILY HISTORY:  No pertinent family history in first degree relatives    [x ] Family history non contributory     BASELINE ADLs (prior to admission):  Independent [ ] moderately [ ] fully   Dependent   [x ] moderately [ ] fully    ADVANCE DIRECTIVES:  [ ] YES [x ] NO   DNR [ ] YES [ x] NO                      MOLST  [ ] YES [ x] NO    Living Will  [ ] YES [x ] NO    Health Care Proxy [ ] YES  [x ] NO      [ x] Surrogate  [ ] HCP  [ ] Guardian:    Wife                                                              Phone#:    Allergies    No Known Allergies    Intolerances        MEDICATIONS  (STANDING):  ALBUTerol/ipratropium for Nebulization 3 milliLiter(s) Nebulizer every 6 hours  amiodarone    Tablet 100 milliGRAM(s) Oral daily  aspirin enteric coated 81 milliGRAM(s) Oral daily  atorvastatin 80 milliGRAM(s) Oral at bedtime  buDESOnide   90 MICROgram(s) Inhaler 2 Puff(s) Inhalation two times a day  chlorhexidine 4% Liquid 1 Application(s) Topical daily  dextrose 5%. 1000 milliLiter(s) (50 mL/Hr) IV Continuous <Continuous>  dextrose 5%. 1000 milliLiter(s) (50 mL/Hr) IV Continuous <Continuous>  dextrose 50% Injectable 12.5 Gram(s) IV Push once  dextrose 50% Injectable 25 Gram(s) IV Push once  dextrose 50% Injectable 25 Gram(s) IV Push once  DOBUTamine Infusion 7.5 MICROgram(s)/kG/Min (16.942 mL/Hr) IV Continuous <Continuous>  docusate sodium 100 milliGRAM(s) Oral two times a day  finasteride 5 milliGRAM(s) Oral daily  influenza   Vaccine 0.5 milliLiter(s) IntraMuscular once  insulin glargine Injectable (LANTUS) 16 Unit(s) SubCutaneous at bedtime  insulin lispro (HumaLOG) corrective regimen sliding scale   SubCutaneous three times a day before meals  insulin lispro (HumaLOG) corrective regimen sliding scale   SubCutaneous at bedtime  insulin lispro Injectable (HumaLOG) 2 Unit(s) SubCutaneous three times a day before meals  levothyroxine 75 MICROGram(s) Oral daily  midodrine 30 milliGRAM(s) Oral three times a day  MIRACLE MOUTHWASH 30 milliLiter(s) Swish and Spit three times a day  Nephro-lynda 1 Tablet(s) Oral daily  polyethylene glycol 3350 17 Gram(s) Oral daily  predniSONE   Tablet 20 milliGRAM(s) Oral daily  senna 2 Tablet(s) Oral at bedtime  warfarin 3 milliGRAM(s) Oral once    MEDICATIONS  (PRN):  benzocaine 15 mG/menthol 3.6 mG Lozenge 1 Lozenge Oral every 6 hours PRN Sore Throat  dextrose Gel 1 Dose(s) Oral once PRN Blood Glucose LESS THAN 70 milliGRAM(s)/deciLiter  dextrose Gel 1 Dose(s) Oral once PRN Blood Glucose LESS THAN 70 milliGRAM(s)/deciliter  glucagon  Injectable 1 milliGRAM(s) IntraMuscular once PRN Glucose <70 milliGRAM(s)/deciLiter  guaiFENesin    Syrup 100 milliGRAM(s) Oral every 6 hours PRN Cough      PRESENT SYMPTOMS:  Source: [x ] Patient   [ ] Family   [ ] Team     Pain: [ ] YES [x ] NO  OLDCARTS:     Dyspnea: [x ] YES [ ] NO   Anxiety: [ ] YES [x ] NO  Fatigue: [x ] YES [ ] NO   Nausea: [ ] YES  [x ] NO  Loss of appetite: [ ] YES [x ] NO   Constipation: [x ] YES [ ] NO     Other Symptoms:  [x ] All other review of systems negative   [ ] Unable to obtain due to poor mentation     Karnofsky Performance Score/Palliative Performance Status Version 2:  40       %  Protein Calorie Malutrition:  [ ] Mild   [ ] Moderate   [ ] Severe     Vital Signs Last 24 Hrs  T(C): 36.3 (01 Mar 2018 12:59), Max: 36.4 (01 Mar 2018 00:40)  T(F): 97.3 (01 Mar 2018 12:59), Max: 97.6 (01 Mar 2018 00:40)  HR: 83 (01 Mar 2018 12:59) (82 - 97)  BP: 122/66 (01 Mar 2018 12:59) (104/65 - 124/74)  BP(mean): --  RR: 16 (01 Mar 2018 12:59) (16 - 18)  SpO2: 100% (01 Mar 2018 12:59) (88% - 100%)    Physical Exam:    General: [x ] Alert,  A&O x     [ ] lethargic   [ ] Agitated   [ ] Cachexia   HEENT: [ ] Normal   [x ] Dry mouth   [ ] ET Tube    [ ] Trach   Lungs: [ ] Clear [ x] Rhonchi  [ ] Crackles [ ] Wheezing [ ] Tachypnea  [ ] Audible excessive secretions  Faint  Cardiovascular:  [ ] Regular rate and rhythm  [ ] Irregular [ ] Tachycardia   [ ] Bradycardia LE edema bilaterally  Abdomen: [x ] Soft  [x ] Distended  [ ]  [ ] +BS  [ x] Non tender [ ] Tender  [ ]PEG   [ ] NGT   Last BM:     Genitourinary: [ ] Normal [ ] Incontinent   [ x] Oliguria/Anuria   [ ] Bellamy  Musculoskeletal:  [ ] Normal   [ x] Generalized weakness  [ ] Bedbound   Neurological: [ x] No focal deficits  [ ] Cognitive impairment     Skin: [  ] Normal   [ ] Pressure ulcers taut LE skin with a slight sheen    LABS:                        10.6   8.45  )-----------( 121      ( 01 Mar 2018 05:55 )             33.2     03-01    130<L>  |  92<L>  |  51<H>  ----------------------------<  299<H>  3.8   |  25  |  5.17<H>    Ca    8.7      01 Mar 2018 05:55  Phos  2.6     02-28  Mg     1.9     02-28    TPro  7.7  /  Alb  2.9<L>  /  TBili  0.5  /  DBili  x   /  AST  86<H>  /  ALT  84<H>  /  AlkPhos  245<H>  03-01    PT/INR - ( 01 Mar 2018 05:55 )   PT: 23.3 SEC;   INR: 2.00          PTT - ( 01 Mar 2018 05:55 )  PTT:44.2 SEC    I&O's Summary    28 Feb 2018 07:01  -  01 Mar 2018 07:00  --------------------------------------------------------  IN: 600 mL / OUT: 0 mL / NET: 600 mL        RADIOLOGY & ADDITIONAL STUDIES:

## 2018-03-05 NOTE — PROGRESS NOTE ADULT - SUBJECTIVE AND OBJECTIVE BOX
Patient is a 62y old  Male who presents with a chief complaint of SOB x few hours (20 Feb 2018 18:05)      SUBJECTIVE / OVERNIGHT EVENTS: I am meeting patient for the 1st time today; pt unfortunately has been readmitted several times, this time for acute on chronic hypoxic respiratory failure in the setting of Influenza B s/p Tamiflu.  Today, pt appears extremely uncomfortable, unable to lie flat on bed and hunched over, describes that it's very difficult to take a full breath in.  Denies any chest pain; has chronically swollen b/l LE.       MEDICATIONS  (STANDING):  ALBUTerol/ipratropium for Nebulization 3 milliLiter(s) Nebulizer every 6 hours  amiodarone    Tablet 100 milliGRAM(s) Oral daily  aspirin enteric coated 81 milliGRAM(s) Oral daily  atorvastatin 80 milliGRAM(s) Oral at bedtime  buDESOnide   90 MICROgram(s) Inhaler 2 Puff(s) Inhalation two times a day  dextrose 5%. 1000 milliLiter(s) (50 mL/Hr) IV Continuous <Continuous>  dextrose 5%. 1000 milliLiter(s) (50 mL/Hr) IV Continuous <Continuous>  dextrose 50% Injectable 12.5 Gram(s) IV Push once  dextrose 50% Injectable 25 Gram(s) IV Push once  dextrose 50% Injectable 25 Gram(s) IV Push once  DOBUTamine Infusion 7.5 MICROgram(s)/kG/Min (16.942 mL/Hr) IV Continuous <Continuous>  docusate sodium 100 milliGRAM(s) Oral two times a day  finasteride 5 milliGRAM(s) Oral daily  influenza   Vaccine 0.5 milliLiter(s) IntraMuscular once  insulin glargine Injectable (LANTUS) 18 Unit(s) SubCutaneous at bedtime  insulin lispro (HumaLOG) corrective regimen sliding scale   SubCutaneous three times a day before meals  insulin lispro (HumaLOG) corrective regimen sliding scale   SubCutaneous at bedtime  insulin lispro Injectable (HumaLOG) 8 Unit(s) SubCutaneous three times a day before meals  levothyroxine 75 MICROGram(s) Oral daily  midodrine 30 milliGRAM(s) Oral three times a day  MIRACLE MOUTHWASH 30 milliLiter(s) Swish and Spit three times a day  Nephro-lynda 1 Tablet(s) Oral daily  polyethylene glycol 3350 17 Gram(s) Oral daily  Saliva Substitute (CAPHOSOL) 30 milliLiter(s) Swish and Spit every 6 hours  senna 2 Tablet(s) Oral at bedtime    MEDICATIONS  (PRN):  benzocaine 15 mG/menthol 3.6 mG Lozenge 1 Lozenge Oral every 6 hours PRN Sore Throat  dextrose Gel 1 Dose(s) Oral once PRN Blood Glucose LESS THAN 70 milliGRAM(s)/deciLiter  dextrose Gel 1 Dose(s) Oral once PRN Blood Glucose LESS THAN 70 milliGRAM(s)/deciliter  glucagon  Injectable 1 milliGRAM(s) IntraMuscular once PRN Glucose <70 milliGRAM(s)/deciLiter  guaiFENesin    Syrup 100 milliGRAM(s) Oral every 6 hours PRN Cough      Vital Signs Last 24 Hrs  T(C): 36.6 (05 Mar 2018 13:00), Max: 36.6 (05 Mar 2018 13:00)  T(F): 97.8 (05 Mar 2018 13:00), Max: 97.8 (05 Mar 2018 13:00)  HR: 88 (05 Mar 2018 13:00) (70 - 900)  BP: 123/80 (05 Mar 2018 13:00) (104/74 - 123/80)  BP(mean): --  RR: 18 (05 Mar 2018 13:00) (18 - 18)  SpO2: 89% (05 Mar 2018 13:00) (89% - 96%)  CAPILLARY BLOOD GLUCOSE      POCT Blood Glucose.: 353 mg/dL (05 Mar 2018 12:56)  POCT Blood Glucose.: 191 mg/dL (05 Mar 2018 09:05)  POCT Blood Glucose.: 257 mg/dL (04 Mar 2018 22:05)  POCT Blood Glucose.: 366 mg/dL (04 Mar 2018 17:55)      PHYSICAL EXAM  GENERAL: Moderate respiratory distress, well-developed  HEAD:  Atraumatic, Normocephalic  EYES: EOMI, PERRLA, conjunctiva and sclera clear  NECK: Supple, +JVD  CHEST/LUNG: +diffuse velcro rales  HEART: Regular rate and rhythm; distant heart sounds   ABDOMEN: Soft, Nontender, Nondistended; Bowel sounds present  EXTREMITIES:  Severe 4+ pitting edema in b/l LE  PSYCH: AAOx3    LABS:                        10.7   10.36 )-----------( 133      ( 05 Mar 2018 04:32 )             32.8     03-05    133<L>  |  93<L>  |  73<H>  ----------------------------<  155<H>  4.4   |  24  |  5.67<H>    Ca    8.9      05 Mar 2018 04:32  Mg     1.8     03-05      PT/INR - ( 05 Mar 2018 04:32 )   PT: 25.2 SEC;   INR: 2.16       Rapid Respiratory Viral Panel (02.13.18 @ 18:40)    Adenovirus (RapRVP): NOT DETECTED    Influenza A (RapRVP): NOT DETECTED (any subtype)    Influenza AH1 2009 (RapRVP): NOT DETECTED    Influenza AH1 (RapRVP): NOT DETECTED    Influenza AH3 (RapRVP): NOT DETECTED    Influenza B (RapRVP): POSITIVE    Parainfluenza 1 (RapRVP): NOT DETECTED    Parainfluenza 2 (RapRVP): NOT DETECTED    Parainfluenza 3 (RapRVP): NOT DETECTED    Parainfluenza 4 (RapRVP): NOT DETECTED    Resp Syncytial Virus (RapRVP): NOT DETECTED    Bordetella pertussis (RapRVP): NOT DETECTED    Chlamydia pneumoniae (RapRVP): NOT DETECTED    Mycoplasma pneumoniae (RapRVP): NOT DETECTED This nucleic acid amplification assay was performed using  the "[employer]Array. The following pathogens are tested  for: Adenovirus, Coronavirus 229E, Coronavirus HKU1,  Coronavirus NL63, Coronavirus OC43, Human Metapneumovirus  (HMPV), Rhinovirus/Enterovirus, Influenza A H1, Influenza A  H1 2009 (Pandemic H1 2009), Influenza A H3, Influenza A (Flu  A) subtype not identified, Influenza B, Parainfluenza Virus  (types 1, 2, 3, 4), Respiratory Syncytial Virus (RSV),  Bordetella pertussis, Chlamydophila pneumoniae, and  Mycoplasma pneumoniae. A negative FilmArray result does not  always exclude the possibility of viral or bacterial  infection. Laboratory results should always be interpreted  in the context of clinical findings.    Entero/Rhinovirus (RapRVP): NOT DETECTED    HKU1 Coronavirus (RapRVP): NOT DETECTED    NL63 Coronavirus (RapRVP): NOT DETECTED    229E Coronavirus (RapRVP): NOT DETECTED    OC43 Coronavirus (RapRVP): NOT DETECTED    hMPV (RapRVP): NOT DETECTED    Blood Gas Profile - Arterial (03.02.18 @ 10:48)    pH, Arterial: 7.38 pH    pCO2, Arterial: 42 mmHg    pO2, Arterial: 58 mmHg    HCO3, Arterial: 24 mmol/L    Base Excess, Arterial: -0.4: BASE EXCESS: REFERENCE RANGE = 0 (+/-) 2 mmol/l mmol/L    Oxygen Saturation, Arterial: 87.5 %        RADIOLOGY & ADDITIONAL TESTS:    Imaging Personally Reviewed:  < from: Xray Chest 1 View- PORTABLE-Urgent (02.26.18 @ 11:27) >  IMPRESSION:    Persistent diffuse interstitial and reticular lung opacities bilaterally,   mildly improved since 2/17/2018, which may represent pulmonary edema   superimposed on patient's known interstitial lung disease.    Small bilateral pleural effusions.    < end of copied text >    Consultant(s) Notes Reviewed:  Cardiology, Pulmonary, Renal, Palliative    Care Discussed with Consultants/Other Providers: Dr Davis (Cards), Dr. Patel (Palliative)

## 2018-03-05 NOTE — PROGRESS NOTE ADULT - PROBLEM SELECTOR PLAN 2
completed course tamiflu for flu b  3/1: finished course of Tamiflu  3/2: Resolved  3/3: resolved: completed Tamiflu  3/5: resolved:: Completed treatment!

## 2018-03-05 NOTE — PROGRESS NOTE ADULT - ASSESSMENT
62 M PMH ESRD on HD, NICM with severe systolic dysfunction (EF 21%), on chronic dobutamine gtt via LUE PICC, also with AICD, chronic hypotension on midodrine 30mg TID, atrial fibrillation on warfarin, COPD, pulmonary fibrosis on home O2, initially presented with acute on chronic respiratory failure in setting of influenza B infection. He was managed in the CCU on norepinephrine - transition to dobutamine/midodrine.  Pt's respiratory status remains very tenuous, requiring additional sessions of HD for fluid removal.

## 2018-03-05 NOTE — PROGRESS NOTE ADULT - PROBLEM SELECTOR PLAN 2
- Pt remains severely volume overloaded  - c/w dobutamine infusion - HD/UF per renal (additional session to be done today and tomorrow)   - As per cardio, no indication for TTE with bubble for R to L shunt eval because patient is not candidate for possible PFO repair at this time.  - Pt unable to take BB or ACE/ARB as he is chronically hypotensive on Midodrine

## 2018-03-05 NOTE — PROGRESS NOTE ADULT - PROBLEM SELECTOR PLAN 8
Provided update and support to the family  Will confer with the patient, and try to have the wife come in for a meeting

## 2018-03-05 NOTE — PROGRESS NOTE ADULT - PROBLEM SELECTOR PLAN 1
s/p hemodialysis 3/3. K ok   fluid overloaded- PUF today, 2.5hrs, uf 2L as tolerated  HD tomorrow 4 grs, 2k bath, Ultrafiltration 2.5L as tolerated  c/w renal diet, fluid restriction reinforced w/pt  Hb 10.7 at goal

## 2018-03-05 NOTE — PROGRESS NOTE ADULT - SUBJECTIVE AND OBJECTIVE BOX
[facility] - Mark / Khai S /Jennifer/ VITO García/ VITO Leigh/ Kolton Farr / LISSA Njeru  ---------------------------------------------------------------------------------------------------------------  seen and examined today for End Stage Renal Disease on Dialysis     subjective: c/o more SOB. denies cp  "i'm very thirsty" admits eating ice     VITALS:  Vital Signs Last 24 Hrs  T(C): 36.5 (05 Mar 2018 10:14), Max: 37.1 (04 Mar 2018 14:55)  T(F): 97.7 (05 Mar 2018 10:14), Max: 98.7 (04 Mar 2018 14:55)  HR: 77 (05 Mar 2018 10:14) (70 - 900)  BP: 113/59 (05 Mar 2018 10:14) (104/61 - 123/63)  BP(mean): --  RR: 18 (05 Mar 2018 10:14) (18 - 18)  SpO2: 93% (05 Mar 2018 10:14) (91% - 96%)    Physical Exam :-  Constitutional: NAD  Neck: Supple.  Respiratory: Bilateral equal breath sounds, bibasilar Crackles present.  Cardiovascular: S1, S2 normal, positive Murmur  Gastrointestinal: Bowel Sounds present, soft, non tender.  Extremities: 4+ pitting pedal Edema   Neurological: Alert and Oriented x 3,  Psychiatric: Normal mood, normal affect    Data:-  No Known Allergies    Hospital Medications:   MEDICATIONS  (STANDING):  ALBUTerol/ipratropium for Nebulization 3 milliLiter(s) Nebulizer every 6 hours  amiodarone    Tablet 100 milliGRAM(s) Oral daily  aspirin enteric coated 81 milliGRAM(s) Oral daily  atorvastatin 80 milliGRAM(s) Oral at bedtime  buDESOnide   90 MICROgram(s) Inhaler 2 Puff(s) Inhalation two times a day  chlorhexidine 4% Liquid 1 Application(s) Topical daily  dextrose 5%. 1000 milliLiter(s) (50 mL/Hr) IV Continuous <Continuous>  dextrose 5%. 1000 milliLiter(s) (50 mL/Hr) IV Continuous <Continuous>  dextrose 50% Injectable 12.5 Gram(s) IV Push once  dextrose 50% Injectable 25 Gram(s) IV Push once  dextrose 50% Injectable 25 Gram(s) IV Push once  DOBUTamine Infusion 7.5 MICROgram(s)/kG/Min (16.942 mL/Hr) IV Continuous <Continuous>  docusate sodium 100 milliGRAM(s) Oral two times a day  finasteride 5 milliGRAM(s) Oral daily  influenza   Vaccine 0.5 milliLiter(s) IntraMuscular once  insulin glargine Injectable (LANTUS) 16 Unit(s) SubCutaneous at bedtime  insulin lispro (HumaLOG) corrective regimen sliding scale   SubCutaneous three times a day before meals  insulin lispro (HumaLOG) corrective regimen sliding scale   SubCutaneous at bedtime  insulin lispro Injectable (HumaLOG) 5 Unit(s) SubCutaneous three times a day before meals  levothyroxine 75 MICROGram(s) Oral daily  midodrine 30 milliGRAM(s) Oral three times a day  MIRACLE MOUTHWASH 30 milliLiter(s) Swish and Spit three times a day  Nephro-lynda 1 Tablet(s) Oral daily  polyethylene glycol 3350 17 Gram(s) Oral daily  predniSONE   Tablet 20 milliGRAM(s) Oral daily  senna 2 Tablet(s) Oral at bedtime    03-05    133<L>  |  93<L>  |  73<H>  ----------------------------<  155<H>  4.4   |  24  |  5.67<H>    Ca    8.9      05 Mar 2018 04:32  Mg     1.8     03-05                          10.7   10.36 )-----------( 133      ( 05 Mar 2018 04:32 )             32.8

## 2018-03-05 NOTE — PROGRESS NOTE ADULT - PROBLEM SELECTOR PLAN 1
- Multifactorial due to influenza and underlying COPD, pulmonary fibrosis.   - Completed course of Tamiflu. Appreciate Pulmonology input - supplemental oxygen to maintain O2 sat >88% on PO steroids (starting to taper). Still with respiratory distress, appears severely volume overloaded.  - BiPAP QHS

## 2018-03-05 NOTE — PROGRESS NOTE ADULT - ASSESSMENT
HPI:  Patient is a 62 year old man with ESRD (HD MWF), NICM (EF 21%) s/p ICD, PICC line in place with home dobutamine drip, atrial fibrillation on coumadin, COPD on home O2 (4-5L), pulmonary fibrosis, hypotension on midodrine with recent admission 1/23-1/31 for diarrhea found to be positive for human meta pneumovirus who now returns to [facility] ER complaining of shortness of breath RVP + consulted for assistance with medical decision making

## 2018-03-05 NOTE — PROGRESS NOTE ADULT - SUBJECTIVE AND OBJECTIVE BOX
Patient is a 62y old  Male who presents with a chief complaint of SOB x few hours (20 Feb 2018 18:05)      Any change in ROS: little more SOB today   more leg edema     MEDICATIONS  (STANDING):  ALBUTerol/ipratropium for Nebulization 3 milliLiter(s) Nebulizer every 6 hours  amiodarone    Tablet 100 milliGRAM(s) Oral daily  aspirin enteric coated 81 milliGRAM(s) Oral daily  atorvastatin 80 milliGRAM(s) Oral at bedtime  buDESOnide   90 MICROgram(s) Inhaler 2 Puff(s) Inhalation two times a day  dextrose 5%. 1000 milliLiter(s) (50 mL/Hr) IV Continuous <Continuous>  dextrose 5%. 1000 milliLiter(s) (50 mL/Hr) IV Continuous <Continuous>  dextrose 50% Injectable 12.5 Gram(s) IV Push once  dextrose 50% Injectable 25 Gram(s) IV Push once  dextrose 50% Injectable 25 Gram(s) IV Push once  DOBUTamine Infusion 7.5 MICROgram(s)/kG/Min (16.942 mL/Hr) IV Continuous <Continuous>  docusate sodium 100 milliGRAM(s) Oral two times a day  finasteride 5 milliGRAM(s) Oral daily  influenza   Vaccine 0.5 milliLiter(s) IntraMuscular once  insulin glargine Injectable (LANTUS) 18 Unit(s) SubCutaneous at bedtime  insulin lispro (HumaLOG) corrective regimen sliding scale   SubCutaneous three times a day before meals  insulin lispro (HumaLOG) corrective regimen sliding scale   SubCutaneous at bedtime  insulin lispro Injectable (HumaLOG) 8 Unit(s) SubCutaneous three times a day before meals  levothyroxine 75 MICROGram(s) Oral daily  midodrine 30 milliGRAM(s) Oral three times a day  MIRACLE MOUTHWASH 30 milliLiter(s) Swish and Spit three times a day  Nephro-lynda 1 Tablet(s) Oral daily  polyethylene glycol 3350 17 Gram(s) Oral daily  Saliva Substitute (CAPHOSOL) 30 milliLiter(s) Swish and Spit every 6 hours  senna 2 Tablet(s) Oral at bedtime    MEDICATIONS  (PRN):  benzocaine 15 mG/menthol 3.6 mG Lozenge 1 Lozenge Oral every 6 hours PRN Sore Throat  dextrose Gel 1 Dose(s) Oral once PRN Blood Glucose LESS THAN 70 milliGRAM(s)/deciLiter  dextrose Gel 1 Dose(s) Oral once PRN Blood Glucose LESS THAN 70 milliGRAM(s)/deciliter  glucagon  Injectable 1 milliGRAM(s) IntraMuscular once PRN Glucose <70 milliGRAM(s)/deciLiter  guaiFENesin    Syrup 100 milliGRAM(s) Oral every 6 hours PRN Cough    Vital Signs Last 24 Hrs  T(C): 36.6 (05 Mar 2018 13:00), Max: 36.6 (05 Mar 2018 13:00)  T(F): 97.8 (05 Mar 2018 13:00), Max: 97.8 (05 Mar 2018 13:00)  HR: 88 (05 Mar 2018 13:00) (70 - 900)  BP: 123/80 (05 Mar 2018 13:00) (104/74 - 123/80)  BP(mean): --  RR: 18 (05 Mar 2018 13:00) (18 - 18)  SpO2: 89% (05 Mar 2018 13:00) (89% - 96%)    I&O's Summary        Physical Exam:   GENERAL: NAD, well-groomed, well-developed  HEENT: BELL/   Atraumatic, Normocephalic  ENMT: No tonsillar erythema, exudates, or enlargement; Moist mucous membranes, Good dentition, No lesions  NECK: Supple, No JVD, Normal thyroid  CHEST/LUNG: Scattered crackles ++  CVS: Regular rate and rhythm; No murmurs, rubs, or gallops  GI: : Soft, Nontender, Nondistended; Bowel sounds present  NERVOUS SYSTEM:  Alert & Oriented X3  EXTREMITIES:  3+ edema  LYMPH: No lymphadenopathy noted  SKIN: No rashes or lesions  ENDOCRINOLOGY: No Thyromegaly  PSYCH: Appropriate    Labs:                              10.7   10.36 )-----------( 133      ( 05 Mar 2018 04:32 )             32.8                         10.6   9.58  )-----------( 140      ( 04 Mar 2018 05:40 )             33.6                         10.6   9.82  )-----------( 123      ( 03 Mar 2018 06:45 )             33.5                         10.5   10.32 )-----------( 120      ( 02 Mar 2018 05:50 )             32.6     03-05    133<L>  |  93<L>  |  73<H>  ----------------------------<  155<H>  4.4   |  24  |  5.67<H>  03-04    136  |  96<L>  |  55<H>  ----------------------------<  308<H>  4.3   |  26  |  4.50<H>  03-03    134<L>  |  95<L>  |  62<H>  ----------------------------<  249<H>  3.8   |  24  |  5.31<H>  03-02    133<L>  |  94<L>  |  39<H>  ----------------------------<  287<H>  3.9   |  26  |  3.87<H>    Ca    8.9      05 Mar 2018 04:32  Ca    8.7      04 Mar 2018 05:40  Mg     1.8     03-05  Mg     1.8     03-04      CAPILLARY BLOOD GLUCOSE      POCT Blood Glucose.: 353 mg/dL (05 Mar 2018 12:56)  POCT Blood Glucose.: 191 mg/dL (05 Mar 2018 09:05)  POCT Blood Glucose.: 257 mg/dL (04 Mar 2018 22:05)  POCT Blood Glucose.: 366 mg/dL (04 Mar 2018 17:55)        PT/INR - ( 05 Mar 2018 04:32 )   PT: 25.2 SEC;   INR: 2.16              Cultures:             < from: Xray Chest 1 View- PORTABLE-Urgent (02.26.18 @ 11:27) >  left-sided cardiac device. There is redemonstration of diffuse   interstitial and reticular opacities bilaterally, mildly improved since   2/17/2018. There are small bilateral pleural effusions.    IMPRESSION:    Persistent diffuse interstitial and reticular lung opacities bilaterally,   mildly improved since 2/17/2018, which may represent pulmonary edema   superimposed on patient's known interstitial lung disease.    Small bilateral pleural effusions.              ANNIA MURPHY M.D., RADIOLOGY RESIDENT  This document has been electronically signed.  NITIN MAHARAJ M.D. ATTENDING RADIOLOGIST  This document has been electronically signed. Feb 26 2018  2:54PM    < end of copied text >                  Studies  Chest X-RAY  CT SCAN Chest   Venous Dopplers: LE:   CT Abdomen  Others

## 2018-03-05 NOTE — PROGRESS NOTE ADULT - PROBLEM SELECTOR PLAN 6
HR controlled  ac as per cards/medicine  management as per cards  3/1: INR is therapeutic:  3/3: INR is therapeutic  3/5: INR therapeutic::

## 2018-03-05 NOTE — PROGRESS NOTE ADULT - PROBLEM SELECTOR PLAN 8
Inotropy per cardiology team.  on dobutamine and midodrine  3/1: on dobutamine  3/3: cont current care by primary team  3/4: on dobutaime:  3/5: On Midodrine and Dobutamine::

## 2018-03-05 NOTE — PROGRESS NOTE ADULT - PROBLEM SELECTOR PLAN 8
- Additional 16 minutes were spent face to face discussing overall hospital course and state of health with patient and his two sisters (Tessie and Berenice).  Unfortunately these two sisters are not designated health care proxies; the pt verbally assigned his wife and two other sisters (Denise and Irma) as his proxies.    - As per Dr. Davis (cards), he has attempted end of life discussions with wife multiple times, but wife and pt do not want to pursue any palliative care discussions. Upon chart review, the CCU also attempted, but pt refused to discuss hospice/palliative  - This remains a very difficult case as pt's prognosis is extremely grave given multiorgan failure and pt has very high likelihood of imminent demise.   - Case d/w with palliative attending Dr. Patel; we will both continue to attempt ongoing discussions with pt and HCPs.

## 2018-03-05 NOTE — PROGRESS NOTE ADULT - PROBLEM SELECTOR PLAN 4
titrate oxygen to keep o2 sat>=90%  c/w suman and pulmicort  2/26 began prednisone 20mg po daily  2/28: cont steroids to see how he responds to it in next few days  3/1: cont steroids as well as BD  3/2: cont steroids  3/3:major issue is pulmonary fibrosis: cont current care:  3/14: cont current care:  3/5: can change pulmicort to symbicort:

## 2018-03-05 NOTE — PROGRESS NOTE ADULT - PROBLEM SELECTOR PLAN 6
- Hypercoagulable state 2/2 chronic Afib   - HR at goal. Not a candidate for beta blockers given chronic hypotension.  - On warfarin. Dose coumadin. Monitor INR.   - c/w amiodarone; as per discussion with Dr. Davis, would continue with Amiodarone despite pulmonary fibrosis.

## 2018-03-05 NOTE — PROGRESS NOTE ADULT - PROBLEM SELECTOR PLAN 1
has underlying pulmonary fibrosis and respiratory failure precipitated by influenza:   Titrate fio2 to keep o2 sat >=90% remains on 50% VM  NIV QHS/PRV  2/28: cont NIV at night and daytime too  3/1: says his breathing is better: would cont to use steroids as well as bipap at night time: Cont oxygen  to keep sao2 above 88%:  3/2: doing reasonable: cont current care:  3/3: decrease  steroids to 15 mg a day  3/4: given risk of increasing retention of salt and water , and he has been on dobutamine: would cut it down to 10 mg tomorrow and slowly taper it to off  3/5: It is very hard to determine whether steroids are beneficially improving his SOB: I doubt that steroids are of much help here: They may also potentiate salt and water retention! Will taper them off!

## 2018-03-05 NOTE — PROGRESS NOTE ADULT - PROBLEM SELECTOR PLAN 2
Chronic hypotension. bp stable now  Continue midodrine, dobutamine   Low BP limiting target UF goal.

## 2018-03-05 NOTE — PROGRESS NOTE ADULT - PROBLEM SELECTOR PLAN 4
- FS glucose elevated, likely because of prednisone.   - lantus increased to 18u QHS and increased pre-meal insulin to 8U TID

## 2018-03-06 LAB
BUN SERPL-MCNC: 86 MG/DL — HIGH (ref 7–23)
CALCIUM SERPL-MCNC: 9 MG/DL — SIGNIFICANT CHANGE UP (ref 8.4–10.5)
CHLORIDE SERPL-SCNC: 91 MMOL/L — LOW (ref 98–107)
CO2 SERPL-SCNC: 23 MMOL/L — SIGNIFICANT CHANGE UP (ref 22–31)
CREAT SERPL-MCNC: 6.66 MG/DL — HIGH (ref 0.5–1.3)
GLUCOSE SERPL-MCNC: 177 MG/DL — HIGH (ref 70–99)
HCT VFR BLD CALC: 33.8 % — LOW (ref 39–50)
HGB BLD-MCNC: 10.6 G/DL — LOW (ref 13–17)
INR BLD: 2.32 — HIGH (ref 0.88–1.17)
MCHC RBC-ENTMCNC: 30.5 PG — SIGNIFICANT CHANGE UP (ref 27–34)
MCHC RBC-ENTMCNC: 31.4 % — LOW (ref 32–36)
MCV RBC AUTO: 97.1 FL — SIGNIFICANT CHANGE UP (ref 80–100)
NRBC # FLD: 0 — SIGNIFICANT CHANGE UP
PLATELET # BLD AUTO: 128 K/UL — LOW (ref 150–400)
PMV BLD: 13.2 FL — HIGH (ref 7–13)
POTASSIUM SERPL-MCNC: 4.5 MMOL/L — SIGNIFICANT CHANGE UP (ref 3.5–5.3)
POTASSIUM SERPL-SCNC: 4.5 MMOL/L — SIGNIFICANT CHANGE UP (ref 3.5–5.3)
PROTHROM AB SERPL-ACNC: 27.1 SEC — HIGH (ref 9.8–13.1)
RBC # BLD: 3.48 M/UL — LOW (ref 4.2–5.8)
RBC # FLD: 18.3 % — HIGH (ref 10.3–14.5)
SODIUM SERPL-SCNC: 131 MMOL/L — LOW (ref 135–145)
WBC # BLD: 10.78 K/UL — HIGH (ref 3.8–10.5)
WBC # FLD AUTO: 10.78 K/UL — HIGH (ref 3.8–10.5)

## 2018-03-06 PROCEDURE — 99233 SBSQ HOSP IP/OBS HIGH 50: CPT

## 2018-03-06 RX ORDER — WARFARIN SODIUM 2.5 MG/1
5 TABLET ORAL ONCE
Qty: 0 | Refills: 0 | Status: COMPLETED | OUTPATIENT
Start: 2018-03-06 | End: 2018-03-06

## 2018-03-06 RX ADMIN — Medication 8 UNIT(S): at 18:08

## 2018-03-06 RX ADMIN — Medication 81 MILLIGRAM(S): at 16:01

## 2018-03-06 RX ADMIN — Medication 30 MILLILITER(S): at 22:28

## 2018-03-06 RX ADMIN — Medication 10 MILLIGRAM(S): at 05:45

## 2018-03-06 RX ADMIN — Medication 30 MILLILITER(S): at 16:01

## 2018-03-06 RX ADMIN — Medication 75 MICROGRAM(S): at 05:45

## 2018-03-06 RX ADMIN — Medication 3 MILLILITER(S): at 04:00

## 2018-03-06 RX ADMIN — POLYETHYLENE GLYCOL 3350 17 GRAM(S): 17 POWDER, FOR SOLUTION ORAL at 16:00

## 2018-03-06 RX ADMIN — Medication 100 MILLIGRAM(S): at 05:45

## 2018-03-06 RX ADMIN — AMIODARONE HYDROCHLORIDE 100 MILLIGRAM(S): 400 TABLET ORAL at 05:45

## 2018-03-06 RX ADMIN — Medication 16.94 MICROGRAM(S)/KG/MIN: at 09:16

## 2018-03-06 RX ADMIN — Medication 16.94 MICROGRAM(S)/KG/MIN: at 18:15

## 2018-03-06 RX ADMIN — SENNA PLUS 2 TABLET(S): 8.6 TABLET ORAL at 22:22

## 2018-03-06 RX ADMIN — MIDODRINE HYDROCHLORIDE 30 MILLIGRAM(S): 2.5 TABLET ORAL at 16:00

## 2018-03-06 RX ADMIN — Medication 100 MILLIGRAM(S): at 16:04

## 2018-03-06 RX ADMIN — Medication 2 PUFF(S): at 22:21

## 2018-03-06 RX ADMIN — WARFARIN SODIUM 5 MILLIGRAM(S): 2.5 TABLET ORAL at 18:09

## 2018-03-06 RX ADMIN — DIPHENHYDRAMINE HYDROCHLORIDE AND LIDOCAINE HYDROCHLORIDE AND ALUMINUM HYDROXIDE AND MAGNESIUM HYDRO 30 MILLILITER(S): KIT at 05:48

## 2018-03-06 RX ADMIN — DIPHENHYDRAMINE HYDROCHLORIDE AND LIDOCAINE HYDROCHLORIDE AND ALUMINUM HYDROXIDE AND MAGNESIUM HYDRO 30 MILLILITER(S): KIT at 22:27

## 2018-03-06 RX ADMIN — MIDODRINE HYDROCHLORIDE 30 MILLIGRAM(S): 2.5 TABLET ORAL at 05:45

## 2018-03-06 RX ADMIN — DIPHENHYDRAMINE HYDROCHLORIDE AND LIDOCAINE HYDROCHLORIDE AND ALUMINUM HYDROXIDE AND MAGNESIUM HYDRO 30 MILLILITER(S): KIT at 16:02

## 2018-03-06 RX ADMIN — Medication 2: at 09:14

## 2018-03-06 RX ADMIN — Medication 30 MILLILITER(S): at 05:45

## 2018-03-06 RX ADMIN — ATORVASTATIN CALCIUM 80 MILLIGRAM(S): 80 TABLET, FILM COATED ORAL at 22:22

## 2018-03-06 RX ADMIN — MIDODRINE HYDROCHLORIDE 30 MILLIGRAM(S): 2.5 TABLET ORAL at 22:22

## 2018-03-06 RX ADMIN — FINASTERIDE 5 MILLIGRAM(S): 5 TABLET, FILM COATED ORAL at 16:01

## 2018-03-06 RX ADMIN — Medication 3 MILLILITER(S): at 16:40

## 2018-03-06 RX ADMIN — Medication 2 PUFF(S): at 09:15

## 2018-03-06 RX ADMIN — Medication 1 TABLET(S): at 16:03

## 2018-03-06 RX ADMIN — Medication 8 UNIT(S): at 09:14

## 2018-03-06 RX ADMIN — Medication 30 MILLILITER(S): at 00:08

## 2018-03-06 RX ADMIN — Medication 8 UNIT(S): at 13:06

## 2018-03-06 RX ADMIN — Medication 1: at 18:09

## 2018-03-06 NOTE — PROGRESS NOTE ADULT - PROBLEM SELECTOR PLAN 3
: supportive treatment:  titrate oxygen to keep o2 sat>=90%  2/26 began trial prednisone for sob-prednisone 20 mg po daily  2/28: started on a trial of prednisone two days ago, will monitor, however per his family, he never had a good response to previous trial of steroid.  3/1: on retrial of steroids: Pt thinks his breathing is better then before: Would like to cont steroids at this t alirio  3/2: cont steroids: pt seems to have stable SOB , does not seem to be improving further to me:  3/3: decrease steroids to 15 mg ad ay  3/4: on 15 mg today , decrease to 10 mg a day  3/5: taper off prednisone: already ordered  3*6: cont steroids

## 2018-03-06 NOTE — PROGRESS NOTE ADULT - PROBLEM SELECTOR PLAN 4
titrate oxygen to keep o2 sat>=90%  c/w suman and pulmicort  2/26 began prednisone 20mg po daily  2/28: cont steroids to see how he responds to it in next few days  3/1: cont steroids as well as BD  3/2: cont steroids  3/3:major issue is pulmonary fibrosis: cont current care:  3/14: cont current care:  3/5: can change pulmicort to symbicort:  3/6: cont low dose steroids

## 2018-03-06 NOTE — PROGRESS NOTE ADULT - PROBLEM SELECTOR PLAN 7
monitor blood glucose: defer to primary care:  3/1: blood glucose is  uncontrolled: ? secondary to steroids: would defer to endo  3/2: still uncontrolled: defer to primary care:  3/6: Blood glucose in 200-300's: Defer to primary

## 2018-03-06 NOTE — PROGRESS NOTE ADULT - ASSESSMENT
62 M PMH ESRD on HD, NICM with severe systolic dysfunction (EF 21%), on chronic dobutamine gtt via LUE PICC, also with AICD, chronic hypotension on midodrine 30mg TID, atrial fibrillation on warfarin, COPD, pulmonary fibrosis on home O2, initially presented with acute on chronic respiratory failure in setting of influenza B infection. He was managed in the CCU on norepinephrine - transition to dobutamine/midodrine.  Pt's respiratory status remains very tenuous, requiring additional sessions of HD for fluid removal.  Plan for family meeting Thursday 12PM.

## 2018-03-06 NOTE — PROGRESS NOTE ADULT - PROBLEM SELECTOR PLAN 4
- FS glucose elevated, likely because of prednisone.   - lantus increased to 18u QHS and increased pre-meal insulin to 8U TID yesterday, will continue to monitor for next 24 hours

## 2018-03-06 NOTE — PROGRESS NOTE ADULT - SUBJECTIVE AND OBJECTIVE BOX
Patient is a 62y old  Male who presents with a chief complaint of SOB (20 Feb 2018 18:05)      SUBJECTIVE / OVERNIGHT EVENTS: Pt feels slightly better today; able to take deeper breaths in.  Pt asks, "can't I get a new heart or new kidney?"        MEDICATIONS  (STANDING):  ALBUTerol/ipratropium for Nebulization 3 milliLiter(s) Nebulizer every 6 hours  amiodarone    Tablet 100 milliGRAM(s) Oral daily  aspirin enteric coated 81 milliGRAM(s) Oral daily  atorvastatin 80 milliGRAM(s) Oral at bedtime  buDESOnide   90 MICROgram(s) Inhaler 2 Puff(s) Inhalation two times a day  dextrose 5%. 1000 milliLiter(s) (50 mL/Hr) IV Continuous <Continuous>  dextrose 5%. 1000 milliLiter(s) (50 mL/Hr) IV Continuous <Continuous>  dextrose 50% Injectable 12.5 Gram(s) IV Push once  dextrose 50% Injectable 25 Gram(s) IV Push once  dextrose 50% Injectable 25 Gram(s) IV Push once  DOBUTamine Infusion 7.5 MICROgram(s)/kG/Min (16.942 mL/Hr) IV Continuous <Continuous>  docusate sodium 100 milliGRAM(s) Oral two times a day  finasteride 5 milliGRAM(s) Oral daily  influenza   Vaccine 0.5 milliLiter(s) IntraMuscular once  insulin glargine Injectable (LANTUS) 18 Unit(s) SubCutaneous at bedtime  insulin lispro (HumaLOG) corrective regimen sliding scale   SubCutaneous three times a day before meals  insulin lispro (HumaLOG) corrective regimen sliding scale   SubCutaneous at bedtime  insulin lispro Injectable (HumaLOG) 8 Unit(s) SubCutaneous three times a day before meals  levothyroxine 75 MICROGram(s) Oral daily  midodrine 30 milliGRAM(s) Oral three times a day  MIRACLE MOUTHWASH 30 milliLiter(s) Swish and Spit three times a day  Nephro-lynda 1 Tablet(s) Oral daily  polyethylene glycol 3350 17 Gram(s) Oral daily  predniSONE   Tablet 10 milliGRAM(s) Oral daily  Saliva Substitute (CAPHOSOL) 30 milliLiter(s) Swish and Spit every 6 hours  senna 2 Tablet(s) Oral at bedtime    MEDICATIONS  (PRN):  benzocaine 15 mG/menthol 3.6 mG Lozenge 1 Lozenge Oral every 6 hours PRN Sore Throat  dextrose Gel 1 Dose(s) Oral once PRN Blood Glucose LESS THAN 70 milliGRAM(s)/deciLiter  dextrose Gel 1 Dose(s) Oral once PRN Blood Glucose LESS THAN 70 milliGRAM(s)/deciliter  glucagon  Injectable 1 milliGRAM(s) IntraMuscular once PRN Glucose <70 milliGRAM(s)/deciLiter  guaiFENesin    Syrup 100 milliGRAM(s) Oral every 6 hours PRN Cough      Vital Signs Last 24 Hrs  T(C): 36.5 (06 Mar 2018 10:50), Max: 36.7 (06 Mar 2018 02:29)  T(F): 97.7 (06 Mar 2018 10:50), Max: 98 (06 Mar 2018 02:29)  HR: 76 (06 Mar 2018 10:50) (64 - 97)  BP: 116/65 (06 Mar 2018 10:50) (102/63 - 135/72)  BP(mean): --  RR: 19 (06 Mar 2018 10:50) (19 - 20)  SpO2: 97% (06 Mar 2018 10:50) (81% - 98%)  CAPILLARY BLOOD GLUCOSE      POCT Blood Glucose.: 148 mg/dL (06 Mar 2018 12:57)  POCT Blood Glucose.: 231 mg/dL (06 Mar 2018 09:01)  POCT Blood Glucose.: 162 mg/dL (06 Mar 2018 05:41)  POCT Blood Glucose.: 230 mg/dL (05 Mar 2018 22:26)  POCT Blood Glucose.: 250 mg/dL (05 Mar 2018 19:21)  POCT Blood Glucose.: 266 mg/dL (05 Mar 2018 18:07)    I&O's Summary    05 Mar 2018 07:01  -  06 Mar 2018 07:00  --------------------------------------------------------  IN: 400 mL / OUT: 1900 mL / NET: -1500 mL      PHYSICAL EXAM  GENERAL: Mild respiratory distress 2/2 SOB, well-developed  HEAD:  Atraumatic, Normocephalic  EYES: EOMI, PERRLA, conjunctiva and sclera clear  NECK: Supple, +JVD  CHEST/LUNG: =bibasilar crackles, no wheezes  HEART: Regular rate and rhythm; No murmurs, rubs, or gallops  ABDOMEN: Soft, Nontender, Nondistended; Bowel sounds present  EXTREMITIES:  3+ severe pitting edema b/l LE   PSYCH: AAOx3  SKIN: No rashes or lesions    LABS:                        10.6   10.78 )-----------( 128      ( 06 Mar 2018 05:10 )             33.8     03-06    131<L>  |  91<L>  |  86<H>  ----------------------------<  177<H>  4.5   |  23  |  6.66<H>    Ca    9.0      06 Mar 2018 05:20  Mg     1.8     03-05      PT/INR - ( 06 Mar 2018 05:10 )   PT: 27.1 SEC;   INR: 2.32            Consultant(s) Notes Reviewed:  Pulm, Renal     Care Discussed with Consultants/Other Providers: Cardiology Dr. Davis

## 2018-03-06 NOTE — PROGRESS NOTE ADULT - PROBLEM SELECTOR PLAN 1
has underlying pulmonary fibrosis and respiratory failure precipitated by influenza:   Titrate fio2 to keep o2 sat >=90% remains on 50% VM  NIV QHS/PRV  2/28: cont NIV at night and daytime too  3/1: says his breathing is better: would cont to use steroids as well as bipap at night time: Cont oxygen  to keep sao2 above 88%:  3/2: doing reasonable: cont current care:  3/3: decrease  steroids to 15 mg a day  3/4: given risk of increasing retention of salt and water , and he has been on dobutamine: would cut it down to 10 mg tomorrow and slowly taper it to off  3/5: It is very hard to determine whether steroids are beneficially improving his SOB: I doubt that steroids are of much help here: They may also potentiate salt and water retention! Will taper them off!  3/6: today sister says she would like to continue steroids for sometime : would continue for now and taper slowly: May be send home on 10 mg of steroids:

## 2018-03-06 NOTE — PROGRESS NOTE ADULT - SUBJECTIVE AND OBJECTIVE BOX
Pt seen and examined at bedside  c/o inc cough, mostly dry, occ expectoration of dark sputum  no chest pain  +ve dyspnea, on nasal  O2. no chest pain.   no nausea,vomiting  no fever,chills      Allergies:  No Known Allergies    Hospital Medications:   MEDICATIONS  (STANDING):  ALBUTerol/ipratropium for Nebulization 3 milliLiter(s) Nebulizer every 6 hours  amiodarone    Tablet 100 milliGRAM(s) Oral daily  aspirin enteric coated 81 milliGRAM(s) Oral daily  atorvastatin 80 milliGRAM(s) Oral at bedtime  buDESOnide   90 MICROgram(s) Inhaler 2 Puff(s) Inhalation two times a day  dextrose 5%. 1000 milliLiter(s) (50 mL/Hr) IV Continuous <Continuous>  dextrose 5%. 1000 milliLiter(s) (50 mL/Hr) IV Continuous <Continuous>  dextrose 50% Injectable 12.5 Gram(s) IV Push once  dextrose 50% Injectable 25 Gram(s) IV Push once  dextrose 50% Injectable 25 Gram(s) IV Push once  DOBUTamine Infusion 7.5 MICROgram(s)/kG/Min (16.942 mL/Hr) IV Continuous <Continuous>  docusate sodium 100 milliGRAM(s) Oral two times a day  finasteride 5 milliGRAM(s) Oral daily  influenza   Vaccine 0.5 milliLiter(s) IntraMuscular once  insulin glargine Injectable (LANTUS) 18 Unit(s) SubCutaneous at bedtime  insulin lispro (HumaLOG) corrective regimen sliding scale   SubCutaneous three times a day before meals  insulin lispro (HumaLOG) corrective regimen sliding scale   SubCutaneous at bedtime  insulin lispro Injectable (HumaLOG) 8 Unit(s) SubCutaneous three times a day before meals  levothyroxine 75 MICROGram(s) Oral daily  midodrine 30 milliGRAM(s) Oral three times a day  MIRACLE MOUTHWASH 30 milliLiter(s) Swish and Spit three times a day  Nephro-lynda 1 Tablet(s) Oral daily  polyethylene glycol 3350 17 Gram(s) Oral daily  predniSONE   Tablet 10 milliGRAM(s) Oral daily  Saliva Substitute (CAPHOSOL) 30 milliLiter(s) Swish and Spit every 6 hours  senna 2 Tablet(s) Oral at bedtime       VITALS:  T(F): 96 (03-06-18 @ 09:48), Max: 98 (03-06-18 @ 02:29)  HR: 73 (03-06-18 @ 09:48)  BP: 102/63 (03-06-18 @ 09:48)  RR: 20 (03-06-18 @ 09:48)  SpO2: 97% (03-06-18 @ 09:48)  Wt(kg): --    03-05 @ 07:01  -  03-06 @ 07:00  --------------------------------------------------------  IN: 400 mL / OUT: 1900 mL / NET: -1500 mL        PHYSICAL EXAM:  Constitutional: slightly tachypneic. on Dialysis.on nasal O2  HEENT: anicteric sclera, oropharynx clear, MMM  Neck: No JVD  Respiratory: CTAB, no wheezes, rales or rhonchi, good air entry  Cardiovascular: S1, S2, RRR  Gastrointestinal: BS+, soft, NT/ND  Extremities: No cyanosis or clubbing. 2+ leg edema  Neurological: A/O x 3, no focal deficits  Psychiatric: Normal mood, normal affect  : No CVA tenderness. No rosa.   Skin: No rashes  Vascular Access:  permacath    LABS:  03-06    131<L>  |  91<L>  |  86<H>  ----------------------------<  177<H>  4.5   |  23  |  6.66<H>    Ca    9.0      06 Mar 2018 05:20  Mg     1.8     03-05      Creatinine Trend: 6.66 <--, 5.67 <--, 4.50 <--, 5.31 <--, 3.87 <--, 5.17 <--, 3.92 <--                        10.6   10.78 )-----------( 128      ( 06 Mar 2018 05:10 )             33.8     Urine Studies:      RADIOLOGY & ADDITIONAL STUDIES:

## 2018-03-06 NOTE — PROGRESS NOTE ADULT - PROBLEM SELECTOR PLAN 8
- Appreciate palliative care involvement.  Spoke with Dr. Patel today from palliative, and we will be meeting with family, including wife, sister, pt's step son and pt's brothers this Thursday at noon.   - A very challenging case because the pt is in multisystem organ failure, and withdrawal of dobutamine/midodrine or HD will essentially lead to his demise.  Wife has expressed interest in pursuing hospice/palliative for many years, but other family members have been in disagreement.

## 2018-03-06 NOTE — PROGRESS NOTE ADULT - ASSESSMENT
HPI:  Patient is a 62 year old man with ESRD (HD MWF), NICM (EF 21%) s/p ICD, PICC line in place with home dobutamine drip, atrial fibrillation on coumadin, COPD on home O2 (4-5L), pulmonary fibrosis, hypotension on midodrine with recent admission 1/23-1/31 for diarrhea found to be positive for human meta pneumovirus who now returns to [facility] ER complaining of shortness of breath RVP + consulted for assistance with medical decision making

## 2018-03-06 NOTE — PROGRESS NOTE ADULT - PROBLEM SELECTOR PLAN 2
- Pt remains severely volume overloaded  - c/w dobutamine infusion - HD/UF per renal (additional session today)  - As per cardio, no indication for TTE with bubble for R to L shunt eval because patient is not candidate for possible PFO repair at this time.  - Pt unable to take BB or ACE/ARB as he is chronically hypotensive on Midodrine

## 2018-03-06 NOTE — PROGRESS NOTE ADULT - PROBLEM SELECTOR PLAN 1
- Multifactorial due to influenza and underlying COPD, pulmonary fibrosis.   - Completed course of Tamiflu. Appreciate Pulmonology input - supplemental oxygen to maintain O2 sat >88% on PO steroids (starting to taper).   - BiPAP QHS

## 2018-03-06 NOTE — PROGRESS NOTE ADULT - SUBJECTIVE AND OBJECTIVE BOX
Patient is a 62y old  Male who presents with a chief complaint of SOB x few hours (20 Feb 2018 18:05)      Any change in ROS: The sister is at bedside today and she tells me , that since the steroids were started his breathing is better     MEDICATIONS  (STANDING):  ALBUTerol/ipratropium for Nebulization 3 milliLiter(s) Nebulizer every 6 hours  amiodarone    Tablet 100 milliGRAM(s) Oral daily  aspirin enteric coated 81 milliGRAM(s) Oral daily  atorvastatin 80 milliGRAM(s) Oral at bedtime  buDESOnide   90 MICROgram(s) Inhaler 2 Puff(s) Inhalation two times a day  dextrose 5%. 1000 milliLiter(s) (50 mL/Hr) IV Continuous <Continuous>  dextrose 5%. 1000 milliLiter(s) (50 mL/Hr) IV Continuous <Continuous>  dextrose 50% Injectable 12.5 Gram(s) IV Push once  dextrose 50% Injectable 25 Gram(s) IV Push once  dextrose 50% Injectable 25 Gram(s) IV Push once  DOBUTamine Infusion 7.5 MICROgram(s)/kG/Min (16.942 mL/Hr) IV Continuous <Continuous>  docusate sodium 100 milliGRAM(s) Oral two times a day  finasteride 5 milliGRAM(s) Oral daily  influenza   Vaccine 0.5 milliLiter(s) IntraMuscular once  insulin glargine Injectable (LANTUS) 18 Unit(s) SubCutaneous at bedtime  insulin lispro (HumaLOG) corrective regimen sliding scale   SubCutaneous three times a day before meals  insulin lispro (HumaLOG) corrective regimen sliding scale   SubCutaneous at bedtime  insulin lispro Injectable (HumaLOG) 8 Unit(s) SubCutaneous three times a day before meals  levothyroxine 75 MICROGram(s) Oral daily  midodrine 30 milliGRAM(s) Oral three times a day  MIRACLE MOUTHWASH 30 milliLiter(s) Swish and Spit three times a day  Nephro-lynda 1 Tablet(s) Oral daily  polyethylene glycol 3350 17 Gram(s) Oral daily  predniSONE   Tablet 10 milliGRAM(s) Oral daily  Saliva Substitute (CAPHOSOL) 30 milliLiter(s) Swish and Spit every 6 hours  senna 2 Tablet(s) Oral at bedtime    MEDICATIONS  (PRN):  benzocaine 15 mG/menthol 3.6 mG Lozenge 1 Lozenge Oral every 6 hours PRN Sore Throat  dextrose Gel 1 Dose(s) Oral once PRN Blood Glucose LESS THAN 70 milliGRAM(s)/deciLiter  dextrose Gel 1 Dose(s) Oral once PRN Blood Glucose LESS THAN 70 milliGRAM(s)/deciliter  glucagon  Injectable 1 milliGRAM(s) IntraMuscular once PRN Glucose <70 milliGRAM(s)/deciLiter  guaiFENesin    Syrup 100 milliGRAM(s) Oral every 6 hours PRN Cough    Vital Signs Last 24 Hrs  T(C): 35.6 (06 Mar 2018 09:48), Max: 36.7 (06 Mar 2018 02:29)  T(F): 96 (06 Mar 2018 09:48), Max: 98 (06 Mar 2018 02:29)  HR: 73 (06 Mar 2018 09:48) (64 - 97)  BP: 102/63 (06 Mar 2018 09:48) (102/63 - 135/72)  BP(mean): --  RR: 20 (06 Mar 2018 09:48) (18 - 20)  SpO2: 97% (06 Mar 2018 09:48) (81% - 98%)    I&O's Summary    05 Mar 2018 07:01  -  06 Mar 2018 07:00  --------------------------------------------------------  IN: 400 mL / OUT: 1900 mL / NET: -1500 mL          Physical Exam:   GENERAL: NAD, well-groomed, well-developed  HEENT: BELL/   Atraumatic, Normocephalic  ENMT: No tonsillar erythema, exudates, or enlargement; Moist mucous membranes, Good dentition, No lesions  NECK: Supple, No JVD, Normal thyroid  CHEST/LUNG: scattered crackles   CVS: Regular rate and rhythm; No murmurs, rubs, or gallops  GI: : Soft, Nontender, Nondistended; Bowel sounds present  NERVOUS SYSTEM:  Alert & Oriented X3  EXTREMITIES:  3 + + edema  LYMPH: No lymphadenopathy noted  SKIN: No rashes or lesions  ENDOCRINOLOGY: No Thyromegaly  PSYCH: Appropriate    Labs:                              10.6   10.78 )-----------( 128      ( 06 Mar 2018 05:10 )             33.8                         10.7   10.36 )-----------( 133      ( 05 Mar 2018 04:32 )             32.8                         10.6   9.58  )-----------( 140      ( 04 Mar 2018 05:40 )             33.6                         10.6   9.82  )-----------( 123      ( 03 Mar 2018 06:45 )             33.5     03-06    131<L>  |  91<L>  |  86<H>  ----------------------------<  177<H>  4.5   |  23  |  6.66<H>  03-05  +  133<L>  |  93<L>  |  73<H>  ----------------------------<  155<H>  4.4   |  24  |  5.67<H>  03-04    136  |  96<L>  |  55<H>  ----------------------------<  308<H>  4.3   |  26  |  4.50<H>  03-03    134<L>  |  95<L>  |  62<H>  ----------------------------<  249<H>  3.8   |  24  |  5.31<H>    Ca    9.0      06 Mar 2018 05:20  Ca    8.9      05 Mar 2018 04:32  Mg     1.8     03-05      CAPILLARY BLOOD GLUCOSE      POCT Blood Glucose.: 231 mg/dL (06 Mar 2018 09:01)  POCT Blood Glucose.: 162 mg/dL (06 Mar 2018 05:41)  POCT Blood Glucose.: 230 mg/dL (05 Mar 2018 22:26)  POCT Blood Glucose.: 250 mg/dL (05 Mar 2018 19:21)  POCT Blood Glucose.: 266 mg/dL (05 Mar 2018 18:07)  POCT Blood Glucose.: 353 mg/dL (05 Mar 2018 12:56)        PT/INR - ( 06 Mar 2018 05:10 )   PT: 27.1 SEC;   INR: 2.32              Cultures:     < from: Xray Chest 1 View- PORTABLE-Urgent (02.26.18 @ 11:27) >  FINDINGS:    A right-sided dialysis catheter terminates over SVC. There is a   left-sided cardiac device. There is redemonstration of diffuse   interstitial and reticular opacities bilaterally, mildly improved since   2/17/2018. There are small bilateral pleural effusions.    IMPRESSION:    Persistent diffuse interstitial and reticular lung opacities bilaterally,   mildly improved since 2/17/2018, which may represent pulmonary edema   superimposed on patient's known interstitial lung disease.    Small bilateral pleural effusions.              ANNIA MURPHY M.D., RADIOLOGY RESIDENT  This document has been electronically signed.  NITIN MAHARAJ M.D. ATTENDING RADIOLOGIST  This document has been electronically signed. Feb 26 2018  2:54PM              < end of copied text >                          Studies  Chest X-RAY  CT SCAN Chest   Venous Dopplers: LE:   CT Abdomen  Others

## 2018-03-06 NOTE — PROGRESS NOTE ADULT - SUBJECTIVE AND OBJECTIVE BOX
Seen during HD session, minimal improvement    Rust colored sputum          T(C): 36.3 (03-06-18 @ 20:16), Max: 36.7 (03-06-18 @ 02:29)  HR: 76 (03-06-18 @ 21:08) (73 - 85)  BP: 118/76 (03-06-18 @ 20:16) (102/63 - 119/65)  RR: 18 (03-06-18 @ 16:47) (18 - 20)  SpO2: 94% (03-06-18 @ 21:08) (88% - 98%)  Wt(kg): --      05 Mar 2018 07:01  -  06 Mar 2018 07:00  --------------------------------------------------------  IN:    Other: 400 mL  Total IN: 400 mL    OUT:    Other: 1900 mL  Total OUT: 1900 mL    Total NET: -1500 mL          03-05 @ 07:01  -  03-06 @ 07:00  --------------------------------------------------------  IN: 400 mL / OUT: 1900 mL / NET: -1500 mL      CAPILLARY BLOOD GLUCOSE      POCT Blood Glucose.: 75 mg/dL (06 Mar 2018 22:30)  POCT Blood Glucose.: 173 mg/dL (06 Mar 2018 17:41)  POCT Blood Glucose.: 148 mg/dL (06 Mar 2018 12:57)  POCT Blood Glucose.: 231 mg/dL (06 Mar 2018 09:01)  POCT Blood Glucose.: 162 mg/dL (06 Mar 2018 05:41)        ALBUTerol/ipratropium for Nebulization 3 milliLiter(s) Nebulizer every 6 hours  amiodarone    Tablet 100 milliGRAM(s) Oral daily  aspirin enteric coated 81 milliGRAM(s) Oral daily  atorvastatin 80 milliGRAM(s) Oral at bedtime  benzocaine 15 mG/menthol 3.6 mG Lozenge 1 Lozenge Oral every 6 hours PRN  buDESOnide   90 MICROgram(s) Inhaler 2 Puff(s) Inhalation two times a day  dextrose 5%. 1000 milliLiter(s) IV Continuous <Continuous>  dextrose 5%. 1000 milliLiter(s) IV Continuous <Continuous>  dextrose 50% Injectable 12.5 Gram(s) IV Push once  dextrose 50% Injectable 25 Gram(s) IV Push once  dextrose 50% Injectable 25 Gram(s) IV Push once  dextrose Gel 1 Dose(s) Oral once PRN  dextrose Gel 1 Dose(s) Oral once PRN  DOBUTamine Infusion 7.5 MICROgram(s)/kG/Min IV Continuous <Continuous>  docusate sodium 100 milliGRAM(s) Oral two times a day  finasteride 5 milliGRAM(s) Oral daily  glucagon  Injectable 1 milliGRAM(s) IntraMuscular once PRN  guaiFENesin    Syrup 100 milliGRAM(s) Oral every 6 hours PRN  influenza   Vaccine 0.5 milliLiter(s) IntraMuscular once  insulin glargine Injectable (LANTUS) 18 Unit(s) SubCutaneous at bedtime  insulin lispro (HumaLOG) corrective regimen sliding scale   SubCutaneous three times a day before meals  insulin lispro (HumaLOG) corrective regimen sliding scale   SubCutaneous at bedtime  insulin lispro Injectable (HumaLOG) 8 Unit(s) SubCutaneous three times a day before meals  levothyroxine 75 MICROGram(s) Oral daily  midodrine 30 milliGRAM(s) Oral three times a day  MIRACLE MOUTHWASH 30 milliLiter(s) Swish and Spit three times a day  Nephro-lynda 1 Tablet(s) Oral daily  polyethylene glycol 3350 17 Gram(s) Oral daily  predniSONE   Tablet 10 milliGRAM(s) Oral daily  Saliva Substitute (CAPHOSOL) 30 milliLiter(s) Swish and Spit every 6 hours  senna 2 Tablet(s) Oral at bedtime          03-06    131<L>  |  91<L>  |  86<H>  ----------------------------<  177<H>  4.5   |  23  |  6.66<H>    Ca    9.0      06 Mar 2018 05:20  Mg     1.8     03-05        Procalc  BNP  ABG                          10.6   10.78 )-----------( 128      ( 06 Mar 2018 05:10 )             33.8   PT/INR - ( 06 Mar 2018 05:10 )   PT: 27.1 SEC;   INR: 2.32                  blood and urine cultures            PERTINENT PMH REVIEWED:  [x ] YES [ ] NO           SOCIAL HISTORY:  Significant other/partner:  [x ] YES  [ ] NO            Children:  [ x] YES  [ ] NO                   Religious/Spirituality: Anabaptism  Substance hx:  [ ] YES   [ x] NO           Tobacco hx:  [x ] YES  [ ] NO             Alcohol hx: [ ] YES  [x ] NO        Home Opioid hx:  [ ] YES  [ x] NO   Living Situation: x[ ] Home  [ ] Long term care  [ ] Rehab    REFERRALS:   [x ] Chaplaincy  [ ] Hospice  [ ] Child Life  [ ] Social Work  [ ] Case management [ ] Holistic Therapy     FAMILY HISTORY:  No pertinent family history in first degree relatives    [x ] Family history non contributory     BASELINE ADLs (prior to admission):  Independent [ ] moderately [ ] fully   Dependent   [x ] moderately [ ] fully    ADVANCE DIRECTIVES:  [ ] YES [x ] NO   DNR [ ] YES [ x] NO                      MOLST  [ ] YES [ x] NO    Living Will  [ ] YES [x ] NO    Health Care Proxy [ ] YES  [x ] NO      [ x] Surrogate  [ ] HCP  [ ] Guardian:    Wife                                                              Phone#:    Allergies    No Known Allergies    Intolerances        MEDICATIONS  (STANDING):  ALBUTerol/ipratropium for Nebulization 3 milliLiter(s) Nebulizer every 6 hours  amiodarone    Tablet 100 milliGRAM(s) Oral daily  aspirin enteric coated 81 milliGRAM(s) Oral daily  atorvastatin 80 milliGRAM(s) Oral at bedtime  buDESOnide   90 MICROgram(s) Inhaler 2 Puff(s) Inhalation two times a day  chlorhexidine 4% Liquid 1 Application(s) Topical daily  dextrose 5%. 1000 milliLiter(s) (50 mL/Hr) IV Continuous <Continuous>  dextrose 5%. 1000 milliLiter(s) (50 mL/Hr) IV Continuous <Continuous>  dextrose 50% Injectable 12.5 Gram(s) IV Push once  dextrose 50% Injectable 25 Gram(s) IV Push once  dextrose 50% Injectable 25 Gram(s) IV Push once  DOBUTamine Infusion 7.5 MICROgram(s)/kG/Min (16.942 mL/Hr) IV Continuous <Continuous>  docusate sodium 100 milliGRAM(s) Oral two times a day  finasteride 5 milliGRAM(s) Oral daily  influenza   Vaccine 0.5 milliLiter(s) IntraMuscular once  insulin glargine Injectable (LANTUS) 16 Unit(s) SubCutaneous at bedtime  insulin lispro (HumaLOG) corrective regimen sliding scale   SubCutaneous three times a day before meals  insulin lispro (HumaLOG) corrective regimen sliding scale   SubCutaneous at bedtime  insulin lispro Injectable (HumaLOG) 2 Unit(s) SubCutaneous three times a day before meals  levothyroxine 75 MICROGram(s) Oral daily  midodrine 30 milliGRAM(s) Oral three times a day  MIRACLE MOUTHWASH 30 milliLiter(s) Swish and Spit three times a day  Nephro-lynda 1 Tablet(s) Oral daily  polyethylene glycol 3350 17 Gram(s) Oral daily  predniSONE   Tablet 20 milliGRAM(s) Oral daily  senna 2 Tablet(s) Oral at bedtime  warfarin 3 milliGRAM(s) Oral once    MEDICATIONS  (PRN):  benzocaine 15 mG/menthol 3.6 mG Lozenge 1 Lozenge Oral every 6 hours PRN Sore Throat  dextrose Gel 1 Dose(s) Oral once PRN Blood Glucose LESS THAN 70 milliGRAM(s)/deciLiter  dextrose Gel 1 Dose(s) Oral once PRN Blood Glucose LESS THAN 70 milliGRAM(s)/deciliter  glucagon  Injectable 1 milliGRAM(s) IntraMuscular once PRN Glucose <70 milliGRAM(s)/deciLiter  guaiFENesin    Syrup 100 milliGRAM(s) Oral every 6 hours PRN Cough      PRESENT SYMPTOMS:  Source: [x ] Patient   [ ] Family   [ ] Team     Pain: [ ] YES [x ] NO  OLDCARTS:     Dyspnea: [x ] YES [ ] NO   Anxiety: [ ] YES [x ] NO  Fatigue: [x ] YES [ ] NO   Nausea: [ ] YES  [x ] NO  Loss of appetite: [ ] YES [x ] NO   Constipation: [x ] YES [ ] NO     Other Symptoms:  [x ] All other review of systems negative   [ ] Unable to obtain due to poor mentation     Karnofsky Performance Score/Palliative Performance Status Version 2:  40       %  Protein Calorie Malutrition:  [ ] Mild   [ ] Moderate   [ ] Severe     Vital Signs Last 24 Hrs  T(C): 36.3 (01 Mar 2018 12:59), Max: 36.4 (01 Mar 2018 00:40)  T(F): 97.3 (01 Mar 2018 12:59), Max: 97.6 (01 Mar 2018 00:40)  HR: 83 (01 Mar 2018 12:59) (82 - 97)  BP: 122/66 (01 Mar 2018 12:59) (104/65 - 124/74)  BP(mean): --  RR: 16 (01 Mar 2018 12:59) (16 - 18)  SpO2: 100% (01 Mar 2018 12:59) (88% - 100%)    Physical Exam:    General: [x ] Alert,  A&O x     [ ] lethargic   [ ] Agitated   [ ] Cachexia   HEENT: [ ] Normal   [x ] Dry mouth   [ ] ET Tube    [ ] Trach   Lungs: [ ] Clear [ x] Rhonchi  [ ] Crackles [ ] Wheezing [ ] Tachypnea  [ ] Audible excessive secretions  Faint  Cardiovascular:  [ ] Regular rate and rhythm  [ ] Irregular [ ] Tachycardia   [ ] Bradycardia LE edema bilaterally  Abdomen: [x ] Soft  [x ] Distended  [ ]  [ ] +BS  [ x] Non tender [ ] Tender  [ ]PEG   [ ] NGT   Last BM:     Genitourinary: [ ] Normal [ ] Incontinent   [ x] Oliguria/Anuria   [ ] Bellamy  Musculoskeletal:  [ ] Normal   [ x] Generalized weakness  [ ] Bedbound   Neurological: [ x] No focal deficits  [ ] Cognitive impairment     Skin: [  ] Normal   [ ] Pressure ulcers taut LE skin with a slight sheen    LABS:                        10.6   8.45  )-----------( 121      ( 01 Mar 2018 05:55 )             33.2     03-01    130<L>  |  92<L>  |  51<H>  ----------------------------<  299<H>  3.8   |  25  |  5.17<H>    Ca    8.7      01 Mar 2018 05:55  Phos  2.6     02-28  Mg     1.9     02-28    TPro  7.7  /  Alb  2.9<L>  /  TBili  0.5  /  DBili  x   /  AST  86<H>  /  ALT  84<H>  /  AlkPhos  245<H>  03-01    PT/INR - ( 01 Mar 2018 05:55 )   PT: 23.3 SEC;   INR: 2.00          PTT - ( 01 Mar 2018 05:55 )  PTT:44.2 SEC    I&O's Summary    28 Feb 2018 07:01  -  01 Mar 2018 07:00  --------------------------------------------------------  IN: 600 mL / OUT: 0 mL / NET: 600 mL        RADIOLOGY & ADDITIONAL STUDIES:

## 2018-03-07 LAB
BUN SERPL-MCNC: 55 MG/DL — HIGH (ref 7–23)
CALCIUM SERPL-MCNC: 8.7 MG/DL — SIGNIFICANT CHANGE UP (ref 8.4–10.5)
CHLORIDE SERPL-SCNC: 90 MMOL/L — LOW (ref 98–107)
CO2 SERPL-SCNC: 25 MMOL/L — SIGNIFICANT CHANGE UP (ref 22–31)
CREAT SERPL-MCNC: 5.04 MG/DL — HIGH (ref 0.5–1.3)
GLUCOSE SERPL-MCNC: 150 MG/DL — HIGH (ref 70–99)
HCT VFR BLD CALC: 33.1 % — LOW (ref 39–50)
HGB BLD-MCNC: 10.9 G/DL — LOW (ref 13–17)
INR BLD: 3.02 — HIGH (ref 0.88–1.17)
MCHC RBC-ENTMCNC: 31.9 PG — SIGNIFICANT CHANGE UP (ref 27–34)
MCHC RBC-ENTMCNC: 32.9 % — SIGNIFICANT CHANGE UP (ref 32–36)
MCV RBC AUTO: 96.8 FL — SIGNIFICANT CHANGE UP (ref 80–100)
NRBC # FLD: 0 — SIGNIFICANT CHANGE UP
PLATELET # BLD AUTO: 101 K/UL — LOW (ref 150–400)
PMV BLD: 13.5 FL — HIGH (ref 7–13)
POTASSIUM SERPL-MCNC: 4.3 MMOL/L — SIGNIFICANT CHANGE UP (ref 3.5–5.3)
POTASSIUM SERPL-SCNC: 4.3 MMOL/L — SIGNIFICANT CHANGE UP (ref 3.5–5.3)
PROTHROM AB SERPL-ACNC: 35.5 SEC — HIGH (ref 9.8–13.1)
RBC # BLD: 3.42 M/UL — LOW (ref 4.2–5.8)
RBC # FLD: 18.3 % — HIGH (ref 10.3–14.5)
SODIUM SERPL-SCNC: 132 MMOL/L — LOW (ref 135–145)
WBC # BLD: 9.84 K/UL — SIGNIFICANT CHANGE UP (ref 3.8–10.5)
WBC # FLD AUTO: 9.84 K/UL — SIGNIFICANT CHANGE UP (ref 3.8–10.5)

## 2018-03-07 PROCEDURE — 99233 SBSQ HOSP IP/OBS HIGH 50: CPT

## 2018-03-07 RX ORDER — WARFARIN SODIUM 2.5 MG/1
0.5 TABLET ORAL ONCE
Qty: 0 | Refills: 0 | Status: COMPLETED | OUTPATIENT
Start: 2018-03-07 | End: 2018-03-07

## 2018-03-07 RX ORDER — INSULIN LISPRO 100/ML
5 VIAL (ML) SUBCUTANEOUS
Qty: 0 | Refills: 0 | Status: DISCONTINUED | OUTPATIENT
Start: 2018-03-07 | End: 2018-03-09

## 2018-03-07 RX ADMIN — POLYETHYLENE GLYCOL 3350 17 GRAM(S): 17 POWDER, FOR SOLUTION ORAL at 11:39

## 2018-03-07 RX ADMIN — Medication 10 MILLIGRAM(S): at 06:12

## 2018-03-07 RX ADMIN — Medication 5 UNIT(S): at 18:41

## 2018-03-07 RX ADMIN — Medication 75 MICROGRAM(S): at 06:11

## 2018-03-07 RX ADMIN — MIDODRINE HYDROCHLORIDE 30 MILLIGRAM(S): 2.5 TABLET ORAL at 21:39

## 2018-03-07 RX ADMIN — Medication 3 MILLILITER(S): at 15:25

## 2018-03-07 RX ADMIN — FINASTERIDE 5 MILLIGRAM(S): 5 TABLET, FILM COATED ORAL at 11:39

## 2018-03-07 RX ADMIN — ATORVASTATIN CALCIUM 80 MILLIGRAM(S): 80 TABLET, FILM COATED ORAL at 21:39

## 2018-03-07 RX ADMIN — Medication 1: at 09:28

## 2018-03-07 RX ADMIN — AMIODARONE HYDROCHLORIDE 100 MILLIGRAM(S): 400 TABLET ORAL at 06:13

## 2018-03-07 RX ADMIN — Medication 30 MILLILITER(S): at 23:47

## 2018-03-07 RX ADMIN — Medication 16.94 MICROGRAM(S)/KG/MIN: at 08:30

## 2018-03-07 RX ADMIN — Medication 81 MILLIGRAM(S): at 11:39

## 2018-03-07 RX ADMIN — MIDODRINE HYDROCHLORIDE 30 MILLIGRAM(S): 2.5 TABLET ORAL at 13:13

## 2018-03-07 RX ADMIN — Medication 30 MILLILITER(S): at 06:16

## 2018-03-07 RX ADMIN — DIPHENHYDRAMINE HYDROCHLORIDE AND LIDOCAINE HYDROCHLORIDE AND ALUMINUM HYDROXIDE AND MAGNESIUM HYDRO 30 MILLILITER(S): KIT at 21:43

## 2018-03-07 RX ADMIN — Medication 3: at 13:12

## 2018-03-07 RX ADMIN — Medication 5 UNIT(S): at 13:12

## 2018-03-07 RX ADMIN — Medication 100 MILLIGRAM(S): at 18:41

## 2018-03-07 RX ADMIN — MIDODRINE HYDROCHLORIDE 30 MILLIGRAM(S): 2.5 TABLET ORAL at 06:11

## 2018-03-07 RX ADMIN — Medication 3 MILLILITER(S): at 05:01

## 2018-03-07 RX ADMIN — Medication 3 MILLILITER(S): at 11:04

## 2018-03-07 RX ADMIN — INSULIN GLARGINE 18 UNIT(S): 100 INJECTION, SOLUTION SUBCUTANEOUS at 21:40

## 2018-03-07 RX ADMIN — Medication 1 TABLET(S): at 11:39

## 2018-03-07 RX ADMIN — WARFARIN SODIUM 0.5 MILLIGRAM(S): 2.5 TABLET ORAL at 18:41

## 2018-03-07 RX ADMIN — Medication 100 MILLIGRAM(S): at 06:10

## 2018-03-07 RX ADMIN — Medication 30 MILLILITER(S): at 11:39

## 2018-03-07 RX ADMIN — Medication 30 MILLILITER(S): at 18:41

## 2018-03-07 RX ADMIN — Medication 2 PUFF(S): at 21:39

## 2018-03-07 RX ADMIN — DIPHENHYDRAMINE HYDROCHLORIDE AND LIDOCAINE HYDROCHLORIDE AND ALUMINUM HYDROXIDE AND MAGNESIUM HYDRO 30 MILLILITER(S): KIT at 13:15

## 2018-03-07 RX ADMIN — Medication 8 UNIT(S): at 09:28

## 2018-03-07 RX ADMIN — Medication 2 PUFF(S): at 09:29

## 2018-03-07 RX ADMIN — Medication 3 MILLILITER(S): at 00:25

## 2018-03-07 RX ADMIN — Medication 5: at 18:41

## 2018-03-07 RX ADMIN — DIPHENHYDRAMINE HYDROCHLORIDE AND LIDOCAINE HYDROCHLORIDE AND ALUMINUM HYDROXIDE AND MAGNESIUM HYDRO 30 MILLILITER(S): KIT at 06:16

## 2018-03-07 NOTE — PROGRESS NOTE ADULT - SUBJECTIVE AND OBJECTIVE BOX
Patient is a 62y old  Male who presents with a chief complaint of SOB x few hours (20 Feb 2018 18:05)      Any change in ROS: Doing same     MEDICATIONS  (STANDING):  ALBUTerol/ipratropium for Nebulization 3 milliLiter(s) Nebulizer every 6 hours  amiodarone    Tablet 100 milliGRAM(s) Oral daily  aspirin enteric coated 81 milliGRAM(s) Oral daily  atorvastatin 80 milliGRAM(s) Oral at bedtime  buDESOnide   90 MICROgram(s) Inhaler 2 Puff(s) Inhalation two times a day  dextrose 5%. 1000 milliLiter(s) (50 mL/Hr) IV Continuous <Continuous>  dextrose 5%. 1000 milliLiter(s) (50 mL/Hr) IV Continuous <Continuous>  dextrose 50% Injectable 12.5 Gram(s) IV Push once  dextrose 50% Injectable 25 Gram(s) IV Push once  dextrose 50% Injectable 25 Gram(s) IV Push once  DOBUTamine Infusion 7.5 MICROgram(s)/kG/Min (16.942 mL/Hr) IV Continuous <Continuous>  docusate sodium 100 milliGRAM(s) Oral two times a day  finasteride 5 milliGRAM(s) Oral daily  influenza   Vaccine 0.5 milliLiter(s) IntraMuscular once  insulin glargine Injectable (LANTUS) 18 Unit(s) SubCutaneous at bedtime  insulin lispro (HumaLOG) corrective regimen sliding scale   SubCutaneous three times a day before meals  insulin lispro (HumaLOG) corrective regimen sliding scale   SubCutaneous at bedtime  insulin lispro Injectable (HumaLOG) 5 Unit(s) SubCutaneous three times a day before meals  levothyroxine 75 MICROGram(s) Oral daily  midodrine 30 milliGRAM(s) Oral three times a day  MIRACLE MOUTHWASH 30 milliLiter(s) Swish and Spit three times a day  Nephro-lynda 1 Tablet(s) Oral daily  polyethylene glycol 3350 17 Gram(s) Oral daily  predniSONE   Tablet 10 milliGRAM(s) Oral daily  Saliva Substitute (CAPHOSOL) 30 milliLiter(s) Swish and Spit every 6 hours  senna 2 Tablet(s) Oral at bedtime  warfarin 0.5 milliGRAM(s) Oral once    MEDICATIONS  (PRN):  benzocaine 15 mG/menthol 3.6 mG Lozenge 1 Lozenge Oral every 6 hours PRN Sore Throat  dextrose Gel 1 Dose(s) Oral once PRN Blood Glucose LESS THAN 70 milliGRAM(s)/deciLiter  dextrose Gel 1 Dose(s) Oral once PRN Blood Glucose LESS THAN 70 milliGRAM(s)/deciliter  glucagon  Injectable 1 milliGRAM(s) IntraMuscular once PRN Glucose <70 milliGRAM(s)/deciLiter  guaiFENesin    Syrup 100 milliGRAM(s) Oral every 6 hours PRN Cough    Vital Signs Last 24 Hrs  T(C): 36.3 (07 Mar 2018 12:41), Max: 36.7 (07 Mar 2018 05:24)  T(F): 97.4 (07 Mar 2018 12:41), Max: 98 (07 Mar 2018 05:24)  HR: 84 (07 Mar 2018 12:41) (71 - 86)  BP: 108/64 (07 Mar 2018 12:41) (101/53 - 119/65)  BP(mean): --  RR: 18 (07 Mar 2018 12:41) (16 - 20)  SpO2: 93% (07 Mar 2018 12:41) (92% - 98%)    I&O's Summary        Physical Exam:   GENERAL: NAD, well-groomed, well-developed  HEENT: BELL/   Atraumatic, Normocephalic  ENMT: No tonsillar erythema, exudates, or enlargement; Moist mucous membranes, Good dentition, No lesions  NECK: Supple, No JVD, Normal thyroid  CHEST/LUNG: Scattered Crackles+  CVS: Regular rate and rhythm; No murmurs, rubs, or gallops  GI: : Soft, Nontender, Nondistended; Bowel sounds present  NERVOUS SYSTEM:  Alert & Oriented X3  EXTREMITIES:  2+ edema  LYMPH: No lymphadenopathy noted  SKIN: No rashes or lesions  ENDOCRINOLOGY: No Thyromegaly  PSYCH: Appropriate    Labs:                              10.9   9.84  )-----------( 101      ( 07 Mar 2018 06:00 )             33.1                         10.6   10.78 )-----------( 128      ( 06 Mar 2018 05:10 )             33.8                         10.7   10.36 )-----------( 133      ( 05 Mar 2018 04:32 )             32.8                         10.6   9.58  )-----------( 140      ( 04 Mar 2018 05:40 )             33.6     03-07    132<L>  |  90<L>  |  55<H>  ----------------------------<  150<H>  4.3   |  25  |  5.04<H>  03-06    131<L>  |  91<L>  |  86<H>  ----------------------------<  177<H>  4.5   |  23  |  6.66<H>  03-05    133<L>  |  93<L>  |  73<H>  ----------------------------<  155<H>  4.4   |  24  |  5.67<H>  03-04    136  |  96<L>  |  55<H>  ----------------------------<  308<H>  4.3   |  26  |  4.50<H>    Ca    8.7      07 Mar 2018 06:00  Ca    9.0      06 Mar 2018 05:20      CAPILLARY BLOOD GLUCOSE      POCT Blood Glucose.: 168 mg/dL (07 Mar 2018 09:22)  POCT Blood Glucose.: 140 mg/dL (06 Mar 2018 23:45)  POCT Blood Glucose.: 90 mg/dL (06 Mar 2018 23:24)  POCT Blood Glucose.: 75 mg/dL (06 Mar 2018 22:30)  POCT Blood Glucose.: 173 mg/dL (06 Mar 2018 17:41)        PT/INR - ( 07 Mar 2018 06:00 )   PT: 35.5 SEC;   INR: 3.02              Cultures:       < from: Xray Chest 1 View- PORTABLE-Urgent (02.26.18 @ 11:27) >  HNIQUE: AP chest radiograph    COMPARISON: Chest radiograph performed 2/17/2018. CT abdomen and pelvis   performed 10/30/2017.    FINDINGS:    A right-sided dialysis catheter terminates over SVC. There is a   left-sided cardiac device. There is redemonstration of diffuse   interstitial and reticular opacities bilaterally, mildly improved since   2/17/2018. There are small bilateral pleural effusions.    IMPRESSION:    Persistent diffuse interstitial and reticular lung opacities bilaterally,   mildly improved since 2/17/2018, which may represent pulmonary edema   superimposed on patient's known interstitial lung disease.    Small bilateral pleural effusions.              ANNIA MURPHY M.D., RADIOLOGY RESIDENT  This document has been electronically signed.  NITIN MAHARAJ M.D. ATTENDING RADIOLOGIST  This document has been electronically signed. Feb 26 2018  2:54PM        < end of copied text >                        Studies  Chest X-RAY  CT SCAN Chest   Venous Dopplers: LE:   CT Abdomen  Others

## 2018-03-07 NOTE — PROGRESS NOTE ADULT - PROBLEM SELECTOR PLAN 1
has underlying pulmonary fibrosis and respiratory failure precipitated by influenza:   Titrate fio2 to keep o2 sat >=90% remains on 50% VM  NIV QHS/PRV  2/28: cont NIV at night and daytime too  3/1: says his breathing is better: would cont to use steroids as well as bipap at night time: Cont oxygen  to keep sao2 above 88%:  3/2: doing reasonable: cont current care:  3/3: decrease  steroids to 15 mg a day  3/4: given risk of increasing retention of salt and water , and he has been on dobutamine: would cut it down to 10 mg tomorrow and slowly taper it to off  3/5: It is very hard to determine whether steroids are beneficially improving his SOB: I doubt that steroids are of much help here: They may also potentiate salt and water retention! Will taper them off!  3/6: today sister says she would like to continue steroids for sometime : would continue for now and taper slowly: May be send home on 10 mg of steroids:  3/7: doing ok : cont current care: Has CMP and is on Dobutamine Drip

## 2018-03-07 NOTE — PROGRESS NOTE ADULT - SUBJECTIVE AND OBJECTIVE BOX
On venturi mask  Reports a "bad night" but cannot articulate why          T(C): 36.7 (03-07-18 @ 05:24), Max: 36.7 (03-07-18 @ 05:24)  HR: 74 (03-07-18 @ 08:08) (73 - 86)  BP: 101/53 (03-07-18 @ 05:24) (101/53 - 119/65)  RR: 16 (03-07-18 @ 05:24) (16 - 20)  SpO2: 98% (03-07-18 @ 08:08) (92% - 98%)  Wt(kg): --        CAPILLARY BLOOD GLUCOSE      POCT Blood Glucose.: 140 mg/dL (06 Mar 2018 23:45)  POCT Blood Glucose.: 90 mg/dL (06 Mar 2018 23:24)  POCT Blood Glucose.: 75 mg/dL (06 Mar 2018 22:30)  POCT Blood Glucose.: 173 mg/dL (06 Mar 2018 17:41)  POCT Blood Glucose.: 148 mg/dL (06 Mar 2018 12:57)  POCT Blood Glucose.: 231 mg/dL (06 Mar 2018 09:01)        ALBUTerol/ipratropium for Nebulization 3 milliLiter(s) Nebulizer every 6 hours  amiodarone    Tablet 100 milliGRAM(s) Oral daily  aspirin enteric coated 81 milliGRAM(s) Oral daily  atorvastatin 80 milliGRAM(s) Oral at bedtime  benzocaine 15 mG/menthol 3.6 mG Lozenge 1 Lozenge Oral every 6 hours PRN  buDESOnide   90 MICROgram(s) Inhaler 2 Puff(s) Inhalation two times a day  dextrose 5%. 1000 milliLiter(s) IV Continuous <Continuous>  dextrose 5%. 1000 milliLiter(s) IV Continuous <Continuous>  dextrose 50% Injectable 12.5 Gram(s) IV Push once  dextrose 50% Injectable 25 Gram(s) IV Push once  dextrose 50% Injectable 25 Gram(s) IV Push once  dextrose Gel 1 Dose(s) Oral once PRN  dextrose Gel 1 Dose(s) Oral once PRN  DOBUTamine Infusion 7.5 MICROgram(s)/kG/Min IV Continuous <Continuous>  docusate sodium 100 milliGRAM(s) Oral two times a day  finasteride 5 milliGRAM(s) Oral daily  glucagon  Injectable 1 milliGRAM(s) IntraMuscular once PRN  guaiFENesin    Syrup 100 milliGRAM(s) Oral every 6 hours PRN  influenza   Vaccine 0.5 milliLiter(s) IntraMuscular once  insulin glargine Injectable (LANTUS) 18 Unit(s) SubCutaneous at bedtime  insulin lispro (HumaLOG) corrective regimen sliding scale   SubCutaneous three times a day before meals  insulin lispro (HumaLOG) corrective regimen sliding scale   SubCutaneous at bedtime  insulin lispro Injectable (HumaLOG) 8 Unit(s) SubCutaneous three times a day before meals  levothyroxine 75 MICROGram(s) Oral daily  midodrine 30 milliGRAM(s) Oral three times a day  MIRACLE MOUTHWASH 30 milliLiter(s) Swish and Spit three times a day  Nephro-lynda 1 Tablet(s) Oral daily  polyethylene glycol 3350 17 Gram(s) Oral daily  predniSONE   Tablet 10 milliGRAM(s) Oral daily  Saliva Substitute (CAPHOSOL) 30 milliLiter(s) Swish and Spit every 6 hours  senna 2 Tablet(s) Oral at bedtime  warfarin 0.5 milliGRAM(s) Oral once          03-07    132<L>  |  90<L>  |  55<H>  ----------------------------<  150<H>  4.3   |  25  |  5.04<H>    Ca    8.7      07 Mar 2018 06:00        Procalc  BNP  ABG                          10.9   9.84  )-----------( 101      ( 07 Mar 2018 06:00 )             33.1   PT/INR - ( 07 Mar 2018 06:00 )   PT: 35.5 SEC;   INR: 3.02                  blood and urine cultures                blood and urine cultures            PERTINENT PM REVIEWED:  [x ] YES [ ] NO           SOCIAL HISTORY:  Significant other/partner:  [x ] YES  [ ] NO            Children:  [ x] YES  [ ] NO                   Buddhist/Spirituality: Cheondoism  Substance hx:  [ ] YES   [ x] NO           Tobacco hx:  [x ] YES  [ ] NO             Alcohol hx: [ ] YES  [x ] NO        Home Opioid hx:  [ ] YES  [ x] NO   Living Situation: x[ ] Home  [ ] Long term care  [ ] Rehab    REFERRALS:   [x ] Chaplaincy  [ ] Hospice  [ ] Child Life  [ ] Social Work  [ ] Case management [ ] Holistic Therapy     FAMILY HISTORY:  No pertinent family history in first degree relatives    [x ] Family history non contributory     BASELINE ADLs (prior to admission):  Independent [ ] moderately [ ] fully   Dependent   [x ] moderately [ ] fully    ADVANCE DIRECTIVES:  [ ] YES [x ] NO   DNR [ ] YES [ x] NO                      MOLST  [ ] YES [ x] NO    Living Will  [ ] YES [x ] NO    Health Care Proxy [ ] YES  [x ] NO      [ x] Surrogate  [ ] HCP  [ ] Guardian:    Wife                                                              Phone#:    Allergies    No Known Allergies    Intolerances        MEDICATIONS  (STANDING):  ALBUTerol/ipratropium for Nebulization 3 milliLiter(s) Nebulizer every 6 hours  amiodarone    Tablet 100 milliGRAM(s) Oral daily  aspirin enteric coated 81 milliGRAM(s) Oral daily  atorvastatin 80 milliGRAM(s) Oral at bedtime  buDESOnide   90 MICROgram(s) Inhaler 2 Puff(s) Inhalation two times a day  chlorhexidine 4% Liquid 1 Application(s) Topical daily  dextrose 5%. 1000 milliLiter(s) (50 mL/Hr) IV Continuous <Continuous>  dextrose 5%. 1000 milliLiter(s) (50 mL/Hr) IV Continuous <Continuous>  dextrose 50% Injectable 12.5 Gram(s) IV Push once  dextrose 50% Injectable 25 Gram(s) IV Push once  dextrose 50% Injectable 25 Gram(s) IV Push once  DOBUTamine Infusion 7.5 MICROgram(s)/kG/Min (16.942 mL/Hr) IV Continuous <Continuous>  docusate sodium 100 milliGRAM(s) Oral two times a day  finasteride 5 milliGRAM(s) Oral daily  influenza   Vaccine 0.5 milliLiter(s) IntraMuscular once  insulin glargine Injectable (LANTUS) 16 Unit(s) SubCutaneous at bedtime  insulin lispro (HumaLOG) corrective regimen sliding scale   SubCutaneous three times a day before meals  insulin lispro (HumaLOG) corrective regimen sliding scale   SubCutaneous at bedtime  insulin lispro Injectable (HumaLOG) 2 Unit(s) SubCutaneous three times a day before meals  levothyroxine 75 MICROGram(s) Oral daily  midodrine 30 milliGRAM(s) Oral three times a day  MIRACLE MOUTHWASH 30 milliLiter(s) Swish and Spit three times a day  Nephro-lynda 1 Tablet(s) Oral daily  polyethylene glycol 3350 17 Gram(s) Oral daily  predniSONE   Tablet 20 milliGRAM(s) Oral daily  senna 2 Tablet(s) Oral at bedtime  warfarin 3 milliGRAM(s) Oral once    MEDICATIONS  (PRN):  benzocaine 15 mG/menthol 3.6 mG Lozenge 1 Lozenge Oral every 6 hours PRN Sore Throat  dextrose Gel 1 Dose(s) Oral once PRN Blood Glucose LESS THAN 70 milliGRAM(s)/deciLiter  dextrose Gel 1 Dose(s) Oral once PRN Blood Glucose LESS THAN 70 milliGRAM(s)/deciliter  glucagon  Injectable 1 milliGRAM(s) IntraMuscular once PRN Glucose <70 milliGRAM(s)/deciLiter  guaiFENesin    Syrup 100 milliGRAM(s) Oral every 6 hours PRN Cough      PRESENT SYMPTOMS:  Source: [x ] Patient   [ ] Family   [ ] Team     Pain: [ ] YES [x ] NO  OLDCARTS:     Dyspnea: [x ] YES [ ] NO   Anxiety: [ ] YES [x ] NO  Fatigue: [x ] YES [ ] NO   Nausea: [ ] YES  [x ] NO  Loss of appetite: [ ] YES [x ] NO   Constipation: [x ] YES [ ] NO     Other Symptoms:  [x ] All other review of systems negative   [ ] Unable to obtain due to poor mentation     Karnofsky Performance Score/Palliative Performance Status Version 2:  40       %  Protein Calorie Malutrition:  [ ] Mild   [ ] Moderate   [ ] Severe     Vital Signs Last 24 Hrs  T(C): 36.3 (01 Mar 2018 12:59), Max: 36.4 (01 Mar 2018 00:40)  T(F): 97.3 (01 Mar 2018 12:59), Max: 97.6 (01 Mar 2018 00:40)  HR: 83 (01 Mar 2018 12:59) (82 - 97)  BP: 122/66 (01 Mar 2018 12:59) (104/65 - 124/74)  BP(mean): --  RR: 16 (01 Mar 2018 12:59) (16 - 18)  SpO2: 100% (01 Mar 2018 12:59) (88% - 100%)    Physical Exam:    General: [x ] Alert,  A&O x     [ ] lethargic   [ ] Agitated   [ ] Cachexia   HEENT: [ ] Normal   [x ] Dry mouth   [ ] ET Tube    [ ] Trach   Lungs: [ ] Clear [ x] Rhonchi  [ ] Crackles [ ] Wheezing [ ] Tachypnea  [ ] Audible excessive secretions  Faint  Cardiovascular:  [ ] Regular rate and rhythm  [ ] Irregular [ ] Tachycardia   [ ] Bradycardia LE edema bilaterally  Abdomen: [x ] Soft  [x ] Distended  [ ]  [ ] +BS  [ x] Non tender [ ] Tender  [ ]PEG   [ ] NGT   Last BM:     Genitourinary: [ ] Normal [ ] Incontinent   [ x] Oliguria/Anuria   [ ] Bellamy  Musculoskeletal:  [ ] Normal   [ x] Generalized weakness  [ ] Bedbound   Neurological: [ x] No focal deficits  [ ] Cognitive impairment     Skin: [  ] Normal   [ ] Pressure ulcers taut LE skin with a slight sheen    LABS:                        10.6   8.45  )-----------( 121      ( 01 Mar 2018 05:55 )             33.2     03-01    130<L>  |  92<L>  |  51<H>  ----------------------------<  299<H>  3.8   |  25  |  5.17<H>    Ca    8.7      01 Mar 2018 05:55  Phos  2.6     02-28  Mg     1.9     02-28    TPro  7.7  /  Alb  2.9<L>  /  TBili  0.5  /  DBili  x   /  AST  86<H>  /  ALT  84<H>  /  AlkPhos  245<H>  03-01    PT/INR - ( 01 Mar 2018 05:55 )   PT: 23.3 SEC;   INR: 2.00          PTT - ( 01 Mar 2018 05:55 )  PTT:44.2 SEC    I&O's Summary    28 Feb 2018 07:01  -  01 Mar 2018 07:00  --------------------------------------------------------  IN: 600 mL / OUT: 0 mL / NET: 600 mL        RADIOLOGY & ADDITIONAL STUDIES:

## 2018-03-07 NOTE — PROGRESS NOTE ADULT - PROBLEM SELECTOR PLAN 3
titrate oxygen to keep o2 sat>=90%  c/w suman and pulmicort  2/26 began prednisone 20mg po daily  2/28: cont steroids to see how he responds to it in next few days  3/1: cont steroids as well as BD  3/2: cont steroids  3/3:major issue is pulmonary fibrosis: cont current care:  3/14: cont current care:  3/5: can change pulmicort to symbicort:  3/6: cont low dose steroids  3/7: on steroids

## 2018-03-07 NOTE — PROGRESS NOTE ADULT - PROBLEM SELECTOR PLAN 8
- Appreciate palliative care involvement.  Spoke with Dr. Patel from palliative, and we will be meeting with family, including wife, sister, pt's step son and pt's brothers tomorrow at noon.   - A very challenging case because the pt is in multisystem organ failure, and withdrawal of dobutamine/midodrine or HD will essentially lead to his demise.  Wife has expressed interest in pursuing hospice/palliative for many years, but other family members have been in disagreement.

## 2018-03-07 NOTE — PROGRESS NOTE ADULT - PROBLEM SELECTOR PLAN 1
- Multifactorial due to influenza (now s/p Tamiflu) and underlying COPD, pulmonary fibrosis.   - Supplemental oxygen to maintain O2 sat >88% on PO steroids (on taper).   - BiPAP QHS

## 2018-03-07 NOTE — PROGRESS NOTE ADULT - PROBLEM SELECTOR PLAN 2
- Pt remains severely volume overloaded  - c/w dobutamine infusion - HD/UF per renal (next HD for tomorrow)  - As per cardio, no indication for TTE with bubble for R to L shunt eval because patient is not candidate for possible PFO repair at this time.  - Pt unable to take BB or ACE/ARB as he is chronically hypotensive on Midodrine

## 2018-03-07 NOTE — PROGRESS NOTE ADULT - SUBJECTIVE AND OBJECTIVE BOX
Patient is a 62y old  Male who presents with a chief complaint of SOB x few hours (20 Feb 2018 18:05)      SUBJECTIVE / OVERNIGHT EVENTS: Pt appears uncomfortable again this morning.  Describes difficulty taking deep breaths in.        MEDICATIONS  (STANDING):  ALBUTerol/ipratropium for Nebulization 3 milliLiter(s) Nebulizer every 6 hours  amiodarone    Tablet 100 milliGRAM(s) Oral daily  aspirin enteric coated 81 milliGRAM(s) Oral daily  atorvastatin 80 milliGRAM(s) Oral at bedtime  buDESOnide   90 MICROgram(s) Inhaler 2 Puff(s) Inhalation two times a day  dextrose 5%. 1000 milliLiter(s) (50 mL/Hr) IV Continuous <Continuous>  dextrose 5%. 1000 milliLiter(s) (50 mL/Hr) IV Continuous <Continuous>  dextrose 50% Injectable 12.5 Gram(s) IV Push once  dextrose 50% Injectable 25 Gram(s) IV Push once  dextrose 50% Injectable 25 Gram(s) IV Push once  DOBUTamine Infusion 7.5 MICROgram(s)/kG/Min (16.942 mL/Hr) IV Continuous <Continuous>  docusate sodium 100 milliGRAM(s) Oral two times a day  finasteride 5 milliGRAM(s) Oral daily  influenza   Vaccine 0.5 milliLiter(s) IntraMuscular once  insulin glargine Injectable (LANTUS) 18 Unit(s) SubCutaneous at bedtime  insulin lispro (HumaLOG) corrective regimen sliding scale   SubCutaneous three times a day before meals  insulin lispro (HumaLOG) corrective regimen sliding scale   SubCutaneous at bedtime  insulin lispro Injectable (HumaLOG) 5 Unit(s) SubCutaneous three times a day before meals  levothyroxine 75 MICROGram(s) Oral daily  midodrine 30 milliGRAM(s) Oral three times a day  MIRACLE MOUTHWASH 30 milliLiter(s) Swish and Spit three times a day  Nephro-lynda 1 Tablet(s) Oral daily  polyethylene glycol 3350 17 Gram(s) Oral daily  predniSONE   Tablet 10 milliGRAM(s) Oral daily  Saliva Substitute (CAPHOSOL) 30 milliLiter(s) Swish and Spit every 6 hours  senna 2 Tablet(s) Oral at bedtime  warfarin 0.5 milliGRAM(s) Oral once    MEDICATIONS  (PRN):  benzocaine 15 mG/menthol 3.6 mG Lozenge 1 Lozenge Oral every 6 hours PRN Sore Throat  dextrose Gel 1 Dose(s) Oral once PRN Blood Glucose LESS THAN 70 milliGRAM(s)/deciLiter  dextrose Gel 1 Dose(s) Oral once PRN Blood Glucose LESS THAN 70 milliGRAM(s)/deciliter  glucagon  Injectable 1 milliGRAM(s) IntraMuscular once PRN Glucose <70 milliGRAM(s)/deciLiter  guaiFENesin    Syrup 100 milliGRAM(s) Oral every 6 hours PRN Cough      Vital Signs Last 24 Hrs  T(C): 36.3 (07 Mar 2018 12:41), Max: 36.7 (07 Mar 2018 05:24)  T(F): 97.4 (07 Mar 2018 12:41), Max: 98 (07 Mar 2018 05:24)  HR: 84 (07 Mar 2018 12:41) (71 - 86)  BP: 108/64 (07 Mar 2018 12:41) (101/53 - 119/65)  BP(mean): --  RR: 18 (07 Mar 2018 12:41) (16 - 20)  SpO2: 93% (07 Mar 2018 12:41) (92% - 98%)  CAPILLARY BLOOD GLUCOSE      POCT Blood Glucose.: 262 mg/dL (07 Mar 2018 13:01)  POCT Blood Glucose.: 168 mg/dL (07 Mar 2018 09:22)  POCT Blood Glucose.: 140 mg/dL (06 Mar 2018 23:45)  POCT Blood Glucose.: 90 mg/dL (06 Mar 2018 23:24)  POCT Blood Glucose.: 75 mg/dL (06 Mar 2018 22:30)  POCT Blood Glucose.: 173 mg/dL (06 Mar 2018 17:41)        PHYSICAL EXAM  GENERAL: Moderate respiratory distress, remains on 5L NC, chronically ill appearing  HEAD:  Atraumatic, Normocephalic  EYES: EOMI, PERRLA, conjunctiva and sclera clear  NECK: +JVD  CHEST/LUNG: Bibasilar crackles, but slighlty improved from 2 days ago  HEART: Regular rate and rhythm; No murmurs, rubs, or gallops  ABDOMEN: Soft, Nontender, Nondistended; Bowel sounds present  EXTREMITIES:  Severe 3+ pitting edema in b/l LE   PSYCH: AAOx3  SKIN: No rashes or lesions    LABS:                        10.9   9.84  )-----------( 101      ( 07 Mar 2018 06:00 )             33.1     03-07    132<L>  |  90<L>  |  55<H>  ----------------------------<  150<H>  4.3   |  25  |  5.04<H>    Ca    8.7      07 Mar 2018 06:00      PT/INR - ( 07 Mar 2018 06:00 )   PT: 35.5 SEC;   INR: 3.02          Consultant(s) Notes Reviewed:  Pulm, Renal, Palliative  Care Discussed with Consultants/Other Providers: Dr. Jorge Johns

## 2018-03-07 NOTE — PROGRESS NOTE ADULT - ASSESSMENT
HPI:  Patient is a 62 year old man with ESRD (HD MWF), NICM (EF 21%) s/p ICD, PICC line in place with home dobutamine drip, atrial fibrillation on coumadin, COPD on home O2 (4-5L), pulmonary fibrosis, hypotension on midodrine with recent admission 1/23-1/31 for diarrhea found to be positive for human meta pneumovirus who now returns to [facility] ER complaining of shortness of breath RVP + consulted for assistance with medical decision making

## 2018-03-07 NOTE — PROGRESS NOTE ADULT - PROBLEM SELECTOR PLAN 2
: supportive treatment:  titrate oxygen to keep o2 sat>=90%  2/26 began trial prednisone for sob-prednisone 20 mg po daily  2/28: started on a trial of prednisone two days ago, will monitor, however per his family, he never had a good response to previous trial of steroid.  3/1: on retrial of steroids: Pt thinks his breathing is better then before: Would like to cont steroids at this t alirio  3/2: cont steroids: pt seems to have stable SOB , does not seem to be improving further to me:  3/3: decrease steroids to 15 mg ad ay  3/4: on 15 mg today , decrease to 10 mg a day  3/5: taper off prednisone: already ordered  3*6: cont steroids  3/7: doing pretty poor: cont steroids trial for some time

## 2018-03-08 DIAGNOSIS — Z71.89 OTHER SPECIFIED COUNSELING: ICD-10-CM

## 2018-03-08 LAB
APTT BLD: 43.1 SEC — HIGH (ref 27.5–37.4)
BUN SERPL-MCNC: 73 MG/DL — HIGH (ref 7–23)
CALCIUM SERPL-MCNC: 8.8 MG/DL — SIGNIFICANT CHANGE UP (ref 8.4–10.5)
CHLORIDE SERPL-SCNC: 92 MMOL/L — LOW (ref 98–107)
CO2 SERPL-SCNC: 22 MMOL/L — SIGNIFICANT CHANGE UP (ref 22–31)
CREAT SERPL-MCNC: 5.91 MG/DL — HIGH (ref 0.5–1.3)
GLUCOSE SERPL-MCNC: 169 MG/DL — HIGH (ref 70–99)
HCT VFR BLD CALC: 32.1 % — LOW (ref 39–50)
HGB BLD-MCNC: 10.5 G/DL — LOW (ref 13–17)
INR BLD: 3.14 — HIGH (ref 0.88–1.17)
MAGNESIUM SERPL-MCNC: 1.9 MG/DL — SIGNIFICANT CHANGE UP (ref 1.6–2.6)
MCHC RBC-ENTMCNC: 32.1 PG — SIGNIFICANT CHANGE UP (ref 27–34)
MCHC RBC-ENTMCNC: 32.7 % — SIGNIFICANT CHANGE UP (ref 32–36)
MCV RBC AUTO: 98.2 FL — SIGNIFICANT CHANGE UP (ref 80–100)
NRBC # FLD: 0 — SIGNIFICANT CHANGE UP
PLATELET # BLD AUTO: 105 K/UL — LOW (ref 150–400)
PMV BLD: 13 FL — SIGNIFICANT CHANGE UP (ref 7–13)
POTASSIUM SERPL-MCNC: 4.6 MMOL/L — SIGNIFICANT CHANGE UP (ref 3.5–5.3)
POTASSIUM SERPL-SCNC: 4.6 MMOL/L — SIGNIFICANT CHANGE UP (ref 3.5–5.3)
PROTHROM AB SERPL-ACNC: 35.7 SEC — HIGH (ref 9.8–13.1)
RBC # BLD: 3.27 M/UL — LOW (ref 4.2–5.8)
RBC # FLD: 18.6 % — HIGH (ref 10.3–14.5)
SODIUM SERPL-SCNC: 134 MMOL/L — LOW (ref 135–145)
WBC # BLD: 9.98 K/UL — SIGNIFICANT CHANGE UP (ref 3.8–10.5)
WBC # FLD AUTO: 9.98 K/UL — SIGNIFICANT CHANGE UP (ref 3.8–10.5)

## 2018-03-08 PROCEDURE — 99498 ADVNCD CARE PLAN ADDL 30 MIN: CPT

## 2018-03-08 PROCEDURE — 99497 ADVNCD CARE PLAN 30 MIN: CPT

## 2018-03-08 PROCEDURE — 99233 SBSQ HOSP IP/OBS HIGH 50: CPT

## 2018-03-08 RX ORDER — SODIUM CHLORIDE 0.65 %
1 AEROSOL, SPRAY (ML) NASAL
Qty: 0 | Refills: 0 | Status: DISCONTINUED | OUTPATIENT
Start: 2018-03-08 | End: 2018-03-23

## 2018-03-08 RX ADMIN — Medication 30 MILLILITER(S): at 05:26

## 2018-03-08 RX ADMIN — Medication 5 UNIT(S): at 09:38

## 2018-03-08 RX ADMIN — Medication 30 MILLILITER(S): at 17:38

## 2018-03-08 RX ADMIN — DIPHENHYDRAMINE HYDROCHLORIDE AND LIDOCAINE HYDROCHLORIDE AND ALUMINUM HYDROXIDE AND MAGNESIUM HYDRO 30 MILLILITER(S): KIT at 05:38

## 2018-03-08 RX ADMIN — Medication 81 MILLIGRAM(S): at 13:58

## 2018-03-08 RX ADMIN — Medication 2 PUFF(S): at 22:06

## 2018-03-08 RX ADMIN — Medication 5 UNIT(S): at 14:07

## 2018-03-08 RX ADMIN — POLYETHYLENE GLYCOL 3350 17 GRAM(S): 17 POWDER, FOR SOLUTION ORAL at 13:55

## 2018-03-08 RX ADMIN — ATORVASTATIN CALCIUM 80 MILLIGRAM(S): 80 TABLET, FILM COATED ORAL at 22:06

## 2018-03-08 RX ADMIN — Medication 10 MILLIGRAM(S): at 05:26

## 2018-03-08 RX ADMIN — Medication 16.94 MICROGRAM(S)/KG/MIN: at 03:14

## 2018-03-08 RX ADMIN — Medication 30 MILLILITER(S): at 23:47

## 2018-03-08 RX ADMIN — DIPHENHYDRAMINE HYDROCHLORIDE AND LIDOCAINE HYDROCHLORIDE AND ALUMINUM HYDROXIDE AND MAGNESIUM HYDRO 30 MILLILITER(S): KIT at 22:07

## 2018-03-08 RX ADMIN — Medication 100 MILLIGRAM(S): at 17:38

## 2018-03-08 RX ADMIN — Medication 3 MILLILITER(S): at 15:39

## 2018-03-08 RX ADMIN — Medication 16.94 MICROGRAM(S)/KG/MIN: at 17:39

## 2018-03-08 RX ADMIN — MIDODRINE HYDROCHLORIDE 30 MILLIGRAM(S): 2.5 TABLET ORAL at 13:57

## 2018-03-08 RX ADMIN — Medication 1: at 17:37

## 2018-03-08 RX ADMIN — DIPHENHYDRAMINE HYDROCHLORIDE AND LIDOCAINE HYDROCHLORIDE AND ALUMINUM HYDROXIDE AND MAGNESIUM HYDRO 30 MILLILITER(S): KIT at 13:59

## 2018-03-08 RX ADMIN — Medication 100 MILLIGRAM(S): at 05:26

## 2018-03-08 RX ADMIN — Medication 3 MILLILITER(S): at 22:20

## 2018-03-08 RX ADMIN — Medication 5 UNIT(S): at 17:37

## 2018-03-08 RX ADMIN — MIDODRINE HYDROCHLORIDE 30 MILLIGRAM(S): 2.5 TABLET ORAL at 22:06

## 2018-03-08 RX ADMIN — Medication 30 MILLILITER(S): at 13:56

## 2018-03-08 RX ADMIN — FINASTERIDE 5 MILLIGRAM(S): 5 TABLET, FILM COATED ORAL at 13:59

## 2018-03-08 RX ADMIN — MIDODRINE HYDROCHLORIDE 30 MILLIGRAM(S): 2.5 TABLET ORAL at 05:26

## 2018-03-08 RX ADMIN — INSULIN GLARGINE 18 UNIT(S): 100 INJECTION, SOLUTION SUBCUTANEOUS at 22:07

## 2018-03-08 RX ADMIN — Medication 75 MICROGRAM(S): at 05:26

## 2018-03-08 RX ADMIN — AMIODARONE HYDROCHLORIDE 100 MILLIGRAM(S): 400 TABLET ORAL at 13:58

## 2018-03-08 RX ADMIN — Medication 2: at 14:07

## 2018-03-08 RX ADMIN — Medication 1 TABLET(S): at 13:56

## 2018-03-08 RX ADMIN — SENNA PLUS 2 TABLET(S): 8.6 TABLET ORAL at 22:06

## 2018-03-08 RX ADMIN — Medication 2 PUFF(S): at 11:35

## 2018-03-08 NOTE — PROGRESS NOTE ADULT - PROBLEM SELECTOR PLAN 2
: supportive treatment:  titrate oxygen to keep o2 sat>=90%  2/26 began trial prednisone for sob-prednisone 20 mg po daily  2/28: started on a trial of prednisone two days ago, will monitor, however per his family, he never had a good response to previous trial of steroid.  3/1: on retrial of steroids: Pt thinks his breathing is better then before: Would like to cont steroids at this t alriio  3/2: cont steroids: pt seems to have stable SOB , does not seem to be improving further to me:  3/3: decrease steroids to 15 mg ad ay  3/4: on 15 mg today , decrease to 10 mg a day  3/5: taper off prednisone: already ordered  3*6: cont steroids  3/7: doing pretty poor: cont steroids trial for some time  3/8: on steroids at tis time: low dose steroids

## 2018-03-08 NOTE — GOALS OF CARE CONVERSATION - PERSONAL ADVANCE DIRECTIVE - NAME OF PERSON WHO ASSISTED
Pt appointed his spouse, Otto Plascencia as his primary proxy [phone] and sister Juanpablo Plascencia [phone] as his alternate proxy.

## 2018-03-08 NOTE — PROGRESS NOTE ADULT - PROBLEM SELECTOR PLAN 8
- Additional 60 minutes were spent face to face with pt's wife (Shirley), two sons, sister (Irma) and brother (Sami) to discussed advanced care planning.  The pt was able to participate in the conversation and deemed to have capacity.  He designated his wife Shirley as primary HCP and sister Irma as secondary (HCP form in chart)  - The patient was very adamant and clear that he would want ALL measures done to stay alive, even if it means a poor quality of life.  He wants all CPR/intubation done, even though medical team recommends against this.  He would like to continue with HD, dobutamine drip, pressor support as long as it keeps him alive.

## 2018-03-08 NOTE — PROGRESS NOTE ADULT - SUBJECTIVE AND OBJECTIVE BOX
Pt seen and examined at bedside  just returned after dialysis. tolerated dialysis well  feels fairly well  c/o inc dry cough, otherwwise ok  over all dyspnea better  no chest pain,dizziness  no vomiting, diarrea      Allergies:  No Known Allergies    Hospital Medications:   MEDICATIONS  (STANDING):  ALBUTerol/ipratropium for Nebulization 3 milliLiter(s) Nebulizer every 6 hours  amiodarone    Tablet 100 milliGRAM(s) Oral daily  aspirin enteric coated 81 milliGRAM(s) Oral daily  atorvastatin 80 milliGRAM(s) Oral at bedtime  buDESOnide   90 MICROgram(s) Inhaler 2 Puff(s) Inhalation two times a day  dextrose 5%. 1000 milliLiter(s) (50 mL/Hr) IV Continuous <Continuous>  dextrose 5%. 1000 milliLiter(s) (50 mL/Hr) IV Continuous <Continuous>  dextrose 50% Injectable 12.5 Gram(s) IV Push once  dextrose 50% Injectable 25 Gram(s) IV Push once  dextrose 50% Injectable 25 Gram(s) IV Push once  DOBUTamine Infusion 7.5 MICROgram(s)/kG/Min (16.942 mL/Hr) IV Continuous <Continuous>  docusate sodium 100 milliGRAM(s) Oral two times a day  finasteride 5 milliGRAM(s) Oral daily  influenza   Vaccine 0.5 milliLiter(s) IntraMuscular once  insulin glargine Injectable (LANTUS) 18 Unit(s) SubCutaneous at bedtime  insulin lispro (HumaLOG) corrective regimen sliding scale   SubCutaneous three times a day before meals  insulin lispro (HumaLOG) corrective regimen sliding scale   SubCutaneous at bedtime  insulin lispro Injectable (HumaLOG) 5 Unit(s) SubCutaneous three times a day before meals  levothyroxine 75 MICROGram(s) Oral daily  midodrine 30 milliGRAM(s) Oral three times a day  MIRACLE MOUTHWASH 30 milliLiter(s) Swish and Spit three times a day  Nephro-lynda 1 Tablet(s) Oral daily  polyethylene glycol 3350 17 Gram(s) Oral daily  predniSONE   Tablet 10 milliGRAM(s) Oral daily  Saliva Substitute (CAPHOSOL) 30 milliLiter(s) Swish and Spit every 6 hours  senna 2 Tablet(s) Oral at bedtime         VITALS:  T(F): 97.2 (03-08-18 @ 10:20), Max: 98 (03-07-18 @ 21:13)  HR: 81 (03-08-18 @ 10:20)  BP: 117/63 (03-08-18 @ 10:20)  RR: 18 (03-08-18 @ 10:20)  SpO2: 98% (03-08-18 @ 10:20)  Wt(kg): --      PHYSICAL EXAM:  Constitutional: NAD, sitting comfortably in bed with nasal O2  HEENT: anicteric sclera, oropharynx clear, MMM  Neck: No JVD  Respiratory: occ crepts rt base. left lung clear  Cardiovascular: S1, S2,irreg  Gastrointestinal: BS+, soft, NT/ND  Extremities: No cyanosis or clubbing. 2+ leg edema  Neurological: A/O x 3, no focal deficits  Psychiatric: Normal mood, normal affect  : No CVA tenderness. No rosa.   Skin: No rashes  Vascular Access: avf    LABS:  03-08    134<L>  |  92<L>  |  73<H>  ----------------------------<  169<H>  4.6   |  22  |  5.91<H>    Ca    8.8      08 Mar 2018 06:23  Mg     1.9     03-08      Creatinine Trend: 5.91 <--, 5.04 <--, 6.66 <--, 5.67 <--, 4.50 <--, 5.31 <--, 3.87 <--                        10.5   9.98  )-----------( 105      ( 08 Mar 2018 06:23 )             32.1     Urine Studies:      RADIOLOGY & ADDITIONAL STUDIES:

## 2018-03-08 NOTE — PROGRESS NOTE ADULT - PROBLEM SELECTOR PLAN 3
titrate oxygen to keep o2 sat>=90%  c/w suman and pulmicort  2/26 began prednisone 20mg po daily  2/28: cont steroids to see how he responds to it in next few days  3/1: cont steroids as well as BD  3/2: cont steroids  3/3:major issue is pulmonary fibrosis: cont current care:  3/14: cont current care:  3/5: can change pulmicort to symbicort:  3/6: cont low dose steroids  3/7: on steroids  3/8: steoids taper

## 2018-03-08 NOTE — PROGRESS NOTE ADULT - PROBLEM SELECTOR PLAN 1
has underlying pulmonary fibrosis and respiratory failure precipitated by influenza:   Titrate fio2 to keep o2 sat >=90% remains on 50% VM  NIV QHS/PRV  2/28: cont NIV at night and daytime too  3/1: says his breathing is better: would cont to use steroids as well as bipap at night time: Cont oxygen  to keep sao2 above 88%:  3/2: doing reasonable: cont current care:  3/3: decrease  steroids to 15 mg a day  3/4: given risk of increasing retention of salt and water , and he has been on dobutamine: would cut it down to 10 mg tomorrow and slowly taper it to off  3/5: It is very hard to determine whether steroids are beneficially improving his SOB: I doubt that steroids are of much help here: They may also potentiate salt and water retention! Will taper them off!  3/6: today sister says she would like to continue steroids for sometime : would continue for now and taper slowly: May be send home on 10 mg of steroids:  3/7: doing ok : cont current care: Has CMP and is on Dobutamine Drip  3/8: on dobutamine

## 2018-03-08 NOTE — PROGRESS NOTE ADULT - SUBJECTIVE AND OBJECTIVE BOX
Patient is a 62y old  Male who presents with a chief complaint of SOB x few hours (20 Feb 2018 18:05)      Any change in ROS: Doing ok    " My Breathing is off and on"     MEDICATIONS  (STANDING):  ALBUTerol/ipratropium for Nebulization 3 milliLiter(s) Nebulizer every 6 hours  amiodarone    Tablet 100 milliGRAM(s) Oral daily  aspirin enteric coated 81 milliGRAM(s) Oral daily  atorvastatin 80 milliGRAM(s) Oral at bedtime  buDESOnide   90 MICROgram(s) Inhaler 2 Puff(s) Inhalation two times a day  dextrose 5%. 1000 milliLiter(s) (50 mL/Hr) IV Continuous <Continuous>  dextrose 5%. 1000 milliLiter(s) (50 mL/Hr) IV Continuous <Continuous>  dextrose 50% Injectable 12.5 Gram(s) IV Push once  dextrose 50% Injectable 25 Gram(s) IV Push once  dextrose 50% Injectable 25 Gram(s) IV Push once  DOBUTamine Infusion 7.5 MICROgram(s)/kG/Min (16.942 mL/Hr) IV Continuous <Continuous>  docusate sodium 100 milliGRAM(s) Oral two times a day  finasteride 5 milliGRAM(s) Oral daily  influenza   Vaccine 0.5 milliLiter(s) IntraMuscular once  insulin glargine Injectable (LANTUS) 18 Unit(s) SubCutaneous at bedtime  insulin lispro (HumaLOG) corrective regimen sliding scale   SubCutaneous three times a day before meals  insulin lispro (HumaLOG) corrective regimen sliding scale   SubCutaneous at bedtime  insulin lispro Injectable (HumaLOG) 5 Unit(s) SubCutaneous three times a day before meals  levothyroxine 75 MICROGram(s) Oral daily  midodrine 30 milliGRAM(s) Oral three times a day  MIRACLE MOUTHWASH 30 milliLiter(s) Swish and Spit three times a day  Nephro-lynda 1 Tablet(s) Oral daily  polyethylene glycol 3350 17 Gram(s) Oral daily  predniSONE   Tablet 10 milliGRAM(s) Oral daily  Saliva Substitute (CAPHOSOL) 30 milliLiter(s) Swish and Spit every 6 hours  senna 2 Tablet(s) Oral at bedtime    MEDICATIONS  (PRN):  benzocaine 15 mG/menthol 3.6 mG Lozenge 1 Lozenge Oral every 6 hours PRN Sore Throat  dextrose Gel 1 Dose(s) Oral once PRN Blood Glucose LESS THAN 70 milliGRAM(s)/deciLiter  dextrose Gel 1 Dose(s) Oral once PRN Blood Glucose LESS THAN 70 milliGRAM(s)/deciliter  glucagon  Injectable 1 milliGRAM(s) IntraMuscular once PRN Glucose <70 milliGRAM(s)/deciLiter  guaiFENesin    Syrup 100 milliGRAM(s) Oral every 6 hours PRN Cough  sodium chloride 0.65% Nasal 1 Spray(s) Both Nostrils every 2 hours PRN Nasal Congestion    Vital Signs Last 24 Hrs  T(C): 36.2 (08 Mar 2018 10:20), Max: 36.7 (07 Mar 2018 21:13)  T(F): 97.2 (08 Mar 2018 10:20), Max: 98 (07 Mar 2018 21:13)  HR: 81 (08 Mar 2018 10:20) (77 - 96)  BP: 117/63 (08 Mar 2018 10:20) (100/64 - 117/63)  BP(mean): --  RR: 18 (08 Mar 2018 10:20) (18 - 18)  SpO2: 98% (08 Mar 2018 10:20) (93% - 98%)    I&O's Summary        Physical Exam:   GENERAL: NAD, well-groomed, well-developed  HEENT: BELL/   Atraumatic, Normocephalic  ENMT: No tonsillar erythema, exudates, or enlargement; Moist mucous membranes, Good dentition, No lesions  NECK: Supple, No JVD, Normal thyroid  CHEST/LUNG: Scattered crackles   CVS: Regular rate and rhythm; No murmurs, rubs, or gallops  GI: : Soft, Nontender, Nondistended; Bowel sounds present  NERVOUS SYSTEM:  Alert & Oriented X3  EXTREMITIES:  2+edema  LYMPH: No lymphadenopathy noted  SKIN: No rashes or lesions  ENDOCRINOLOGY: No Thyromegaly  PSYCH: Appropriate    Labs:                              10.5   9.98  )-----------( 105      ( 08 Mar 2018 06:23 )             32.1                         10.9   9.84  )-----------( 101      ( 07 Mar 2018 06:00 )             33.1                         10.6   10.78 )-----------( 128      ( 06 Mar 2018 05:10 )             33.8                         10.7   10.36 )-----------( 133      ( 05 Mar 2018 04:32 )             32.8     03-08    134<L>  |  92<L>  |  73<H>  ----------------------------<  169<H>  4.6   |  22  |  5.91<H>  03-07    132<L>  |  90<L>  |  55<H>  ----------------------------<  150<H>  4.3   |  25  |  5.04<H>  03-06    131<L>  |  91<L>  |  86<H>  ----------------------------<  177<H>  4.5   |  23  |  6.66<H>  03-05    133<L>  |  93<L>  |  73<H>  ----------------------------<  155<H>  4.4   |  24  |  5.67<H>    Ca    8.8      08 Mar 2018 06:23  Ca    8.7      07 Mar 2018 06:00  Mg     1.9     03-08      CAPILLARY BLOOD GLUCOSE      POCT Blood Glucose.: 130 mg/dL (08 Mar 2018 08:53)  POCT Blood Glucose.: 123 mg/dL (08 Mar 2018 01:38)  POCT Blood Glucose.: 168 mg/dL (07 Mar 2018 22:58)  POCT Blood Glucose.: 353 mg/dL (07 Mar 2018 18:02)  POCT Blood Glucose.: 262 mg/dL (07 Mar 2018 13:01)        PT/INR - ( 08 Mar 2018 06:23 )   PT: 35.7 SEC;   INR: 3.14          PTT - ( 08 Mar 2018 06:23 )  PTT:43.1 SEC    Cultures:         < from: Xray Chest 1 View- PORTABLE-Urgent (02.26.18 @ 11:27) >    COMPARISON: Chest radiograph performed 2/17/2018. CT abdomen and pelvis   performed 10/30/2017.    FINDINGS:    A right-sided dialysis catheter terminates over SVC. There is a   left-sided cardiac device. There is redemonstration of diffuse   interstitial and reticular opacities bilaterally, mildly improved since   2/17/2018. There are small bilateral pleural effusions.    IMPRESSION:    Persistent diffuse interstitial and reticular lung opacities bilaterally,   mildly improved since 2/17/2018, which may represent pulmonary edema   superimposed on patient's known interstitial lung disease.    Small bilateral pleural effusions.              ANNIA MURPHY M.D., RADIOLOGY RESIDENT  This document has been electronically signed.  NITIN MAHARAJ M.D. ATTENDING RADIOLOGIST  This document has been electronically signed. Feb 26 2018  2:54PM    < end of copied text >                      Studies  Chest X-RAY  CT SCAN Chest   Venous Dopplers: LE:   CT Abdomen  Others

## 2018-03-08 NOTE — GOALS OF CARE CONVERSATION - PERSONAL ADVANCE DIRECTIVE - CONVERSATION DETAILS
Meeting held with pt and family at bedside. Discussed pt's overall medical condition including summary of pt's illness and trajectory of disease. Pt states he would like all life preserving measures: abx, IVF, vasopressors. He would like all attempts at resuscitation including CPR. He wants to continue on current management of dobutamine and HD. Discussed option of hospice and comfort centered approach at length but pt stated he was not ready to die and did not want to withdraw any life sustaining and extending care. Pt states that length of time was most important and not necessarily quality of life. Pt stated that if he was not able to make medical decisions in the future he would want his wife as his primary proxy and sister, Juanpablo Plascencia, as his alternate proxy.

## 2018-03-08 NOTE — PROGRESS NOTE ADULT - PROBLEM SELECTOR PLAN 3
titrate oxygen to keep o2 sat>=90%  c/w suman and pulmicort  2/26 began prednisone 20mg po daily  as per PUlm  2/28: cont steroids to see how he responds to it in next few days  3/1: cont steroids as well as BD  3/2: cont steroids  3/3:major issue is pulmonary fibrosis: cont current care:  3/14: cont current care:  3/5: can change pulmicort to symbicort:  3/6: cont low dose steroids  3/7: on steroids  as per Pulm

## 2018-03-08 NOTE — PROGRESS NOTE ADULT - PROBLEM SELECTOR PLAN 9
Stakeholders: Wife, Son Duane, Son Bubba, Brother Sami  Sister Juanpablo  Palliative Medicine: Candi DRAKE, Isela Del Cid MD, Segundo Yanez  Extensive conversation  HCP designated by the patient  Primary HCP His Wife Noizel [phone]  If no success then contact Juanpablo Plascencia: Sister who is the secondary [phone]  He would like all life preserving measures: abx, IVF, vasopressors  He would like all attempts at resuscitation, namely CPR  He would like to maintain dobutamine and HD  Explained hospice and a comfort centered approach at length but there was no interest by the patient.  The length of life was most important  Time spent >70 minutes

## 2018-03-08 NOTE — PROGRESS NOTE ADULT - SUBJECTIVE AND OBJECTIVE BOX
Patient is a 62y old  Male who presents with a chief complaint of SOB x few hours (20 Feb 2018 18:05)      SUBJECTIVE / OVERNIGHT EVENTS: Pt remains dyspneic at rest.  Unable to lie flat on bed, requires 5L NC constantly.  Had extensive family meeting this afternoon.       MEDICATIONS  (STANDING):  ALBUTerol/ipratropium for Nebulization 3 milliLiter(s) Nebulizer every 6 hours  amiodarone    Tablet 100 milliGRAM(s) Oral daily  aspirin enteric coated 81 milliGRAM(s) Oral daily  atorvastatin 80 milliGRAM(s) Oral at bedtime  buDESOnide   90 MICROgram(s) Inhaler 2 Puff(s) Inhalation two times a day  dextrose 5%. 1000 milliLiter(s) (50 mL/Hr) IV Continuous <Continuous>  dextrose 5%. 1000 milliLiter(s) (50 mL/Hr) IV Continuous <Continuous>  dextrose 50% Injectable 12.5 Gram(s) IV Push once  dextrose 50% Injectable 25 Gram(s) IV Push once  dextrose 50% Injectable 25 Gram(s) IV Push once  DOBUTamine Infusion 7.5 MICROgram(s)/kG/Min (16.942 mL/Hr) IV Continuous <Continuous>  docusate sodium 100 milliGRAM(s) Oral two times a day  finasteride 5 milliGRAM(s) Oral daily  influenza   Vaccine 0.5 milliLiter(s) IntraMuscular once  insulin glargine Injectable (LANTUS) 18 Unit(s) SubCutaneous at bedtime  insulin lispro (HumaLOG) corrective regimen sliding scale   SubCutaneous three times a day before meals  insulin lispro (HumaLOG) corrective regimen sliding scale   SubCutaneous at bedtime  insulin lispro Injectable (HumaLOG) 5 Unit(s) SubCutaneous three times a day before meals  levothyroxine 75 MICROGram(s) Oral daily  midodrine 30 milliGRAM(s) Oral three times a day  MIRACLE MOUTHWASH 30 milliLiter(s) Swish and Spit three times a day  Nephro-lynda 1 Tablet(s) Oral daily  polyethylene glycol 3350 17 Gram(s) Oral daily  predniSONE   Tablet 10 milliGRAM(s) Oral daily  Saliva Substitute (CAPHOSOL) 30 milliLiter(s) Swish and Spit every 6 hours  senna 2 Tablet(s) Oral at bedtime    MEDICATIONS  (PRN):  benzocaine 15 mG/menthol 3.6 mG Lozenge 1 Lozenge Oral every 6 hours PRN Sore Throat  dextrose Gel 1 Dose(s) Oral once PRN Blood Glucose LESS THAN 70 milliGRAM(s)/deciLiter  dextrose Gel 1 Dose(s) Oral once PRN Blood Glucose LESS THAN 70 milliGRAM(s)/deciliter  glucagon  Injectable 1 milliGRAM(s) IntraMuscular once PRN Glucose <70 milliGRAM(s)/deciLiter  guaiFENesin    Syrup 100 milliGRAM(s) Oral every 6 hours PRN Cough  sodium chloride 0.65% Nasal 1 Spray(s) Both Nostrils every 2 hours PRN Nasal Congestion      Vital Signs Last 24 Hrs  T(C): 36.2 (08 Mar 2018 14:30), Max: 36.7 (07 Mar 2018 21:13)  T(F): 97.1 (08 Mar 2018 14:30), Max: 98 (07 Mar 2018 21:13)  HR: 78 (08 Mar 2018 14:30) (70 - 96)  BP: 115/69 (08 Mar 2018 14:30) (98/66 - 117/63)  BP(mean): --  RR: 18 (08 Mar 2018 14:30) (18 - 18)  SpO2: 91% (08 Mar 2018 14:30) (91% - 98%)  CAPILLARY BLOOD GLUCOSE      POCT Blood Glucose.: 245 mg/dL (08 Mar 2018 13:53)  POCT Blood Glucose.: 245 mg/dL (08 Mar 2018 12:38)  POCT Blood Glucose.: 130 mg/dL (08 Mar 2018 08:53)  POCT Blood Glucose.: 123 mg/dL (08 Mar 2018 01:38)  POCT Blood Glucose.: 168 mg/dL (07 Mar 2018 22:58)  POCT Blood Glucose.: 353 mg/dL (07 Mar 2018 18:02)    I&O's Summary    08 Mar 2018 07:01  -  08 Mar 2018 15:28  --------------------------------------------------------  IN: 500 mL / OUT: 3000 mL / NET: -2500 mL        PHYSICAL EXAM  GENERAL: Moderate respiratory distress  HEAD:  Atraumatic, Normocephalic  EYES: EOMI, PERRLA, conjunctiva and sclera clear  NECK: +JVD   CHEST/LUNG: bibasilar crackles   HEART: Regular rate and rhythm; No murmurs, rubs, or gallops  ABDOMEN: Soft, Nontender, Nondistended; Bowel sounds present  EXTREMITIES:3+ pitting edema b/l LE   PSYCH: AAOx3  SKIN: No rashes or lesions    LABS:                        10.5   9.98  )-----------( 105      ( 08 Mar 2018 06:23 )             32.1     03-08    134<L>  |  92<L>  |  73<H>  ----------------------------<  169<H>  4.6   |  22  |  5.91<H>    Ca    8.8      08 Mar 2018 06:23  Mg     1.9     03-08      PT/INR - ( 08 Mar 2018 06:23 )   PT: 35.7 SEC;   INR: 3.14          PTT - ( 08 Mar 2018 06:23 )  PTT:43.1 SEC          Care Discussed with Consultants/Other Providers: Palliative, Dr. Patel

## 2018-03-08 NOTE — PROGRESS NOTE ADULT - SUBJECTIVE AND OBJECTIVE BOX
Nasal cannula on  Sitting in tripod position  Family member present    PERTINENT PMH REVIEWED:  [x ] YES [ ] NO           SOCIAL HISTORY:  Significant other/partner:  [x ] YES  [ ] NO            Children:  [ x] YES  [ ] NO                   Advent/Spirituality: Hinduism  Substance hx:  [ ] YES   [ x] NO           Tobacco hx:  [x ] YES  [ ] NO             Alcohol hx: [ ] YES  [x ] NO        Home Opioid hx:  [ ] YES  [ x] NO   Living Situation: x[ ] Home  [ ] Long term care  [ ] Rehab    REFERRALS:   [x ] Chaplaincy  [ ] Hospice  [ ] Child Life  [ ] Social Work  [ ] Case management [ ] Holistic Therapy     FAMILY HISTORY:  No pertinent family history in first degree relatives    [x ] Family history non contributory     BASELINE ADLs (prior to admission):  Independent [ ] moderately [ ] fully   Dependent   [x ] moderately [ ] fully    ADVANCE DIRECTIVES:  [ ] YES [x ] NO   DNR [ ] YES [ x] NO                      MOLST  [ ] YES [ x] NO    Living Will  [ ] YES [x ] NO    Health Care Proxy [ ] YES  [x ] NO      [ x] Surrogate  [ ] HCP  [ ] Guardian:    Wife                                                              Phone#:    Allergies    No Known Allergies    Intolerances        MEDICATIONS  (STANDING):  ALBUTerol/ipratropium for Nebulization 3 milliLiter(s) Nebulizer every 6 hours  amiodarone    Tablet 100 milliGRAM(s) Oral daily  aspirin enteric coated 81 milliGRAM(s) Oral daily  atorvastatin 80 milliGRAM(s) Oral at bedtime  buDESOnide   90 MICROgram(s) Inhaler 2 Puff(s) Inhalation two times a day  chlorhexidine 4% Liquid 1 Application(s) Topical daily  dextrose 5%. 1000 milliLiter(s) (50 mL/Hr) IV Continuous <Continuous>  dextrose 5%. 1000 milliLiter(s) (50 mL/Hr) IV Continuous <Continuous>  dextrose 50% Injectable 12.5 Gram(s) IV Push once  dextrose 50% Injectable 25 Gram(s) IV Push once  dextrose 50% Injectable 25 Gram(s) IV Push once  DOBUTamine Infusion 7.5 MICROgram(s)/kG/Min (16.942 mL/Hr) IV Continuous <Continuous>  docusate sodium 100 milliGRAM(s) Oral two times a day  finasteride 5 milliGRAM(s) Oral daily  influenza   Vaccine 0.5 milliLiter(s) IntraMuscular once  insulin glargine Injectable (LANTUS) 16 Unit(s) SubCutaneous at bedtime  insulin lispro (HumaLOG) corrective regimen sliding scale   SubCutaneous three times a day before meals  insulin lispro (HumaLOG) corrective regimen sliding scale   SubCutaneous at bedtime  insulin lispro Injectable (HumaLOG) 2 Unit(s) SubCutaneous three times a day before meals  levothyroxine 75 MICROGram(s) Oral daily  midodrine 30 milliGRAM(s) Oral three times a day  MIRACLE MOUTHWASH 30 milliLiter(s) Swish and Spit three times a day  Nephro-lynda 1 Tablet(s) Oral daily  polyethylene glycol 3350 17 Gram(s) Oral daily  predniSONE   Tablet 20 milliGRAM(s) Oral daily  senna 2 Tablet(s) Oral at bedtime  warfarin 3 milliGRAM(s) Oral once    MEDICATIONS  (PRN):  benzocaine 15 mG/menthol 3.6 mG Lozenge 1 Lozenge Oral every 6 hours PRN Sore Throat  dextrose Gel 1 Dose(s) Oral once PRN Blood Glucose LESS THAN 70 milliGRAM(s)/deciLiter  dextrose Gel 1 Dose(s) Oral once PRN Blood Glucose LESS THAN 70 milliGRAM(s)/deciliter  glucagon  Injectable 1 milliGRAM(s) IntraMuscular once PRN Glucose <70 milliGRAM(s)/deciLiter  guaiFENesin    Syrup 100 milliGRAM(s) Oral every 6 hours PRN Cough      PRESENT SYMPTOMS:  Source: [x ] Patient   [ ] Family   [ ] Team     Pain: [ ] YES [x ] NO  OLDCARTS:     Dyspnea: [x ] YES [ ] NO   Anxiety: [ ] YES [x ] NO  Fatigue: [x ] YES [ ] NO   Nausea: [ ] YES  [x ] NO  Loss of appetite: [ ] YES [x ] NO   Constipation: [x ] YES [ ] NO     Other Symptoms:  [x ] All other review of systems negative   [ ] Unable to obtain due to poor mentation     Karnofsky Performance Score/Palliative Performance Status Version 2:  40       %  Protein Calorie Malutrition:  [ ] Mild   [ ] Moderate   [ ] Severe     Vital Signs Last 24 Hrs  T(C): 36.3 (01 Mar 2018 12:59), Max: 36.4 (01 Mar 2018 00:40)  T(F): 97.3 (01 Mar 2018 12:59), Max: 97.6 (01 Mar 2018 00:40)  HR: 83 (01 Mar 2018 12:59) (82 - 97)  BP: 122/66 (01 Mar 2018 12:59) (104/65 - 124/74)  BP(mean): --  RR: 16 (01 Mar 2018 12:59) (16 - 18)  SpO2: 100% (01 Mar 2018 12:59) (88% - 100%)    Physical Exam:    General: [x ] Alert,  A&O x     [ ] lethargic   [ ] Agitated   [ ] Cachexia   HEENT: [ ] Normal   [x ] Dry mouth   [ ] ET Tube    [ ] Trach   Lungs: [ ] Clear [ x] Rhonchi  [ ] Crackles [ ] Wheezing [ ] Tachypnea  [ ] Audible excessive secretions  Faint  Cardiovascular:  [ ] Regular rate and rhythm  [ ] Irregular [ ] Tachycardia   [ ] Bradycardia LE edema bilaterally  Abdomen: [x ] Soft  [x ] Distended  [ ]  [ ] +BS  [ x] Non tender [ ] Tender  [ ]PEG   [ ] NGT   Last BM:     Genitourinary: [ ] Normal [ ] Incontinent   [ x] Oliguria/Anuria   [ ] Bellamy  Musculoskeletal:  [ ] Normal   [ x] Generalized weakness  [ ] Bedbound   Neurological: [ x] No focal deficits  [ ] Cognitive impairment     Skin: [  ] Normal   [ ] Pressure ulcers taut LE skin with a slight sheen    LABS:                        10.6   8.45  )-----------( 121      ( 01 Mar 2018 05:55 )             33.2     03-01    130<L>  |  92<L>  |  51<H>  ----------------------------<  299<H>  3.8   |  25  |  5.17<H>    Ca    8.7      01 Mar 2018 05:55  Phos  2.6     02-28  Mg     1.9     02-28    TPro  7.7  /  Alb  2.9<L>  /  TBili  0.5  /  DBili  x   /  AST  86<H>  /  ALT  84<H>  /  AlkPhos  245<H>  03-01    PT/INR - ( 01 Mar 2018 05:55 )   PT: 23.3 SEC;   INR: 2.00          PTT - ( 01 Mar 2018 05:55 )  PTT:44.2 SEC    I&O's Summary    28 Feb 2018 07:01  -  01 Mar 2018 07:00  --------------------------------------------------------  IN: 600 mL / OUT: 0 mL / NET: 600 mL        RADIOLOGY & ADDITIONAL STUDIES:

## 2018-03-08 NOTE — PROGRESS NOTE ADULT - PROBLEM SELECTOR PLAN 2
: supportive treatment:  titrate oxygen to keep o2 sat>=90%  2/26 began trial prednisone for sob-prednisone 10 mg daily  as per Pulm  2/28: started on a trial of prednisone two days ago, will monitor, however per his family, he never had a good response to previous trial of steroid.  3/1: on retrial of steroids: Pt thinks his breathing is better then before: Would like to cont steroids at this t alirio  3/2: cont steroids: pt seems to have stable SOB , does not seem to be improving further to me:  3/3: decrease steroids to 15 mg ad ay  3/4: on 15 mg today , decrease to 10 mg a day  3/5: taper off prednisone: already ordered  3*6: cont steroids  3/7: doing pretty poor: cont steroids trial for some time

## 2018-03-08 NOTE — PROGRESS NOTE ADULT - PROBLEM SELECTOR PLAN 2
- Pt remains severely volume overloaded  - c/w dobutamine infusion - HD/UF per renal   - As per cardio, no indication for TTE with bubble for R to L shunt eval because patient is not candidate for possible PFO repair at this time.  - Pt unable to take BB or ACE/ARB as he is chronically hypotensive on Midodrine

## 2018-03-08 NOTE — PROGRESS NOTE ADULT - ASSESSMENT
HPI:  Patient is a 62 year old man with ESRD (HD MWF), NICM (EF 21%) s/p ICD, PICC line in place with home dobutamine drip, atrial fibrillation on coumadin, COPD on home O2 (4-5L), pulmonary fibrosis, hypotension on midodrine with recent admission 1/23-1/31 for diarrhea found to be positive for human meta pneumovirus who now returns to [facility] ER complaining of shortness of breath RVP + consulted for assistance with medical decision making

## 2018-03-08 NOTE — PROGRESS NOTE ADULT - PROBLEM SELECTOR PLAN 1
has underlying pulmonary fibrosis and respiratory failure precipitated by influenza:   Titrate fio2 to keep o2 sat >=90% remains on 50% VM  NIV QHS/PRV  as per Pulm  2/28: cont NIV at night and daytime too  3/1: says his breathing is better: would cont to use steroids as well as bipap at night time: Cont oxygen  to keep sao2 above 88%:  3/2: doing reasonable: cont current care:  3/3: decrease  steroids to 15 mg a day  3/4: given risk of increasing retention of salt and water , and he has been on dobutamine: would cut it down to 10 mg tomorrow and slowly taper it to off  3/5: It is very hard to determine whether steroids are beneficially improving his SOB: I doubt that steroids are of much help here: They may also potentiate salt and water retention! Will taper them off!  3/6: today sister says she would like to continue steroids for sometime : would continue for now and taper slowly: May be send home on 10 mg of steroids:  3/7: doing ok : cont current care: Has CMP and is on Dobutamine Drip

## 2018-03-08 NOTE — PROGRESS NOTE ADULT - ASSESSMENT
62 M PMH ESRD on HD, NICM with severe systolic dysfunction (EF 21%), on chronic dobutamine gtt via LUE PICC, also with AICD, chronic hypotension on midodrine 30mg TID, atrial fibrillation on warfarin, COPD, pulmonary fibrosis on home O2, initially presented with acute on chronic respiratory failure in setting of influenza B infection. He was managed in the CCU on norepinephrine - transition to dobutamine/midodrine.  Pt's respiratory status remains very tenuous, requiring additional sessions of HD for fluid removal.  Extensive family meeting today, pt essentially wants to remain full code.

## 2018-03-09 LAB
BUN SERPL-MCNC: 56 MG/DL — HIGH (ref 7–23)
CALCIUM SERPL-MCNC: 8.8 MG/DL — SIGNIFICANT CHANGE UP (ref 8.4–10.5)
CHLORIDE SERPL-SCNC: 93 MMOL/L — LOW (ref 98–107)
CO2 SERPL-SCNC: 24 MMOL/L — SIGNIFICANT CHANGE UP (ref 22–31)
CREAT SERPL-MCNC: 4.62 MG/DL — HIGH (ref 0.5–1.3)
GLUCOSE SERPL-MCNC: 234 MG/DL — HIGH (ref 70–99)
HCT VFR BLD CALC: 33.9 % — LOW (ref 39–50)
HGB BLD-MCNC: 10.7 G/DL — LOW (ref 13–17)
INR BLD: 2.16 — HIGH (ref 0.88–1.17)
MAGNESIUM SERPL-MCNC: 2 MG/DL — SIGNIFICANT CHANGE UP (ref 1.6–2.6)
MCHC RBC-ENTMCNC: 30.7 PG — SIGNIFICANT CHANGE UP (ref 27–34)
MCHC RBC-ENTMCNC: 31.6 % — LOW (ref 32–36)
MCV RBC AUTO: 97.4 FL — SIGNIFICANT CHANGE UP (ref 80–100)
NRBC # FLD: 0 — SIGNIFICANT CHANGE UP
PLATELET # BLD AUTO: 113 K/UL — LOW (ref 150–400)
PMV BLD: 12.8 FL — SIGNIFICANT CHANGE UP (ref 7–13)
POTASSIUM SERPL-MCNC: 4 MMOL/L — SIGNIFICANT CHANGE UP (ref 3.5–5.3)
POTASSIUM SERPL-SCNC: 4 MMOL/L — SIGNIFICANT CHANGE UP (ref 3.5–5.3)
PROTHROM AB SERPL-ACNC: 25.2 SEC — HIGH (ref 9.8–13.1)
RBC # BLD: 3.48 M/UL — LOW (ref 4.2–5.8)
RBC # FLD: 18.9 % — HIGH (ref 10.3–14.5)
SODIUM SERPL-SCNC: 133 MMOL/L — LOW (ref 135–145)
WBC # BLD: 10.42 K/UL — SIGNIFICANT CHANGE UP (ref 3.8–10.5)
WBC # FLD AUTO: 10.42 K/UL — SIGNIFICANT CHANGE UP (ref 3.8–10.5)

## 2018-03-09 PROCEDURE — 99233 SBSQ HOSP IP/OBS HIGH 50: CPT

## 2018-03-09 RX ORDER — INSULIN LISPRO 100/ML
8 VIAL (ML) SUBCUTANEOUS
Qty: 0 | Refills: 0 | Status: DISCONTINUED | OUTPATIENT
Start: 2018-03-09 | End: 2018-03-16

## 2018-03-09 RX ORDER — INSULIN GLARGINE 100 [IU]/ML
20 INJECTION, SOLUTION SUBCUTANEOUS AT BEDTIME
Qty: 0 | Refills: 0 | Status: DISCONTINUED | OUTPATIENT
Start: 2018-03-09 | End: 2018-03-23

## 2018-03-09 RX ORDER — WARFARIN SODIUM 2.5 MG/1
1 TABLET ORAL ONCE
Qty: 0 | Refills: 0 | Status: COMPLETED | OUTPATIENT
Start: 2018-03-09 | End: 2018-03-09

## 2018-03-09 RX ADMIN — MIDODRINE HYDROCHLORIDE 30 MILLIGRAM(S): 2.5 TABLET ORAL at 23:20

## 2018-03-09 RX ADMIN — Medication 30 MILLILITER(S): at 05:40

## 2018-03-09 RX ADMIN — Medication 75 MICROGRAM(S): at 05:39

## 2018-03-09 RX ADMIN — Medication 10 MILLIGRAM(S): at 05:39

## 2018-03-09 RX ADMIN — Medication 3 MILLILITER(S): at 21:59

## 2018-03-09 RX ADMIN — Medication 3 MILLILITER(S): at 04:10

## 2018-03-09 RX ADMIN — Medication 5 UNIT(S): at 09:18

## 2018-03-09 RX ADMIN — Medication 1 TABLET(S): at 13:32

## 2018-03-09 RX ADMIN — ATORVASTATIN CALCIUM 80 MILLIGRAM(S): 80 TABLET, FILM COATED ORAL at 23:20

## 2018-03-09 RX ADMIN — FINASTERIDE 5 MILLIGRAM(S): 5 TABLET, FILM COATED ORAL at 13:32

## 2018-03-09 RX ADMIN — Medication 16.94 MICROGRAM(S)/KG/MIN: at 18:51

## 2018-03-09 RX ADMIN — INSULIN GLARGINE 20 UNIT(S): 100 INJECTION, SOLUTION SUBCUTANEOUS at 23:21

## 2018-03-09 RX ADMIN — Medication 5: at 13:29

## 2018-03-09 RX ADMIN — Medication 8 UNIT(S): at 13:24

## 2018-03-09 RX ADMIN — Medication 100 MILLIGRAM(S): at 18:51

## 2018-03-09 RX ADMIN — WARFARIN SODIUM 1 MILLIGRAM(S): 2.5 TABLET ORAL at 18:51

## 2018-03-09 RX ADMIN — DIPHENHYDRAMINE HYDROCHLORIDE AND LIDOCAINE HYDROCHLORIDE AND ALUMINUM HYDROXIDE AND MAGNESIUM HYDRO 30 MILLILITER(S): KIT at 23:21

## 2018-03-09 RX ADMIN — Medication 3 MILLILITER(S): at 15:22

## 2018-03-09 RX ADMIN — DIPHENHYDRAMINE HYDROCHLORIDE AND LIDOCAINE HYDROCHLORIDE AND ALUMINUM HYDROXIDE AND MAGNESIUM HYDRO 30 MILLILITER(S): KIT at 13:33

## 2018-03-09 RX ADMIN — Medication 3: at 09:18

## 2018-03-09 RX ADMIN — Medication 8 UNIT(S): at 18:52

## 2018-03-09 RX ADMIN — Medication 30 MILLILITER(S): at 23:20

## 2018-03-09 RX ADMIN — Medication 30 MILLILITER(S): at 13:31

## 2018-03-09 RX ADMIN — Medication 2 PUFF(S): at 23:19

## 2018-03-09 RX ADMIN — Medication 1: at 17:44

## 2018-03-09 RX ADMIN — Medication 100 MILLIGRAM(S): at 05:39

## 2018-03-09 RX ADMIN — AMIODARONE HYDROCHLORIDE 100 MILLIGRAM(S): 400 TABLET ORAL at 05:39

## 2018-03-09 RX ADMIN — MIDODRINE HYDROCHLORIDE 30 MILLIGRAM(S): 2.5 TABLET ORAL at 05:39

## 2018-03-09 RX ADMIN — MIDODRINE HYDROCHLORIDE 30 MILLIGRAM(S): 2.5 TABLET ORAL at 15:02

## 2018-03-09 RX ADMIN — SENNA PLUS 2 TABLET(S): 8.6 TABLET ORAL at 23:20

## 2018-03-09 RX ADMIN — DIPHENHYDRAMINE HYDROCHLORIDE AND LIDOCAINE HYDROCHLORIDE AND ALUMINUM HYDROXIDE AND MAGNESIUM HYDRO 30 MILLILITER(S): KIT at 05:40

## 2018-03-09 RX ADMIN — POLYETHYLENE GLYCOL 3350 17 GRAM(S): 17 POWDER, FOR SOLUTION ORAL at 13:32

## 2018-03-09 RX ADMIN — Medication 81 MILLIGRAM(S): at 13:32

## 2018-03-09 RX ADMIN — Medication 30 MILLILITER(S): at 18:51

## 2018-03-09 RX ADMIN — Medication 3 MILLILITER(S): at 09:23

## 2018-03-09 RX ADMIN — Medication 2 PUFF(S): at 09:19

## 2018-03-09 NOTE — PROGRESS NOTE ADULT - PROBLEM SELECTOR PLAN 2
: supportive treatment:  titrate oxygen to keep o2 sat>=90%  2/26 began trial prednisone for sob-prednisone 20 mg po daily  2/28: started on a trial of prednisone two days ago, will monitor, however per his family, he never had a good response to previous trial of steroid.  3/1: on retrial of steroids: Pt thinks his breathing is better then before: Would like to cont steroids at this t alirio  3/2: cont steroids: pt seems to have stable SOB , does not seem to be improving further to me:  3/3: decrease steroids to 15 mg ad ay  3/4: on 15 mg today , decrease to 10 mg a day  3/5: taper off prednisone: already ordered  3*6: cont steroids  3/7: doing pretty poor: cont steroids trial for some time  3/8: on steroids at tis time: low dose steroids  3/9: It is difficult to discern if steroids are helpful in him: However the pt as well as the sister insists that we cont to try steroids: They think he feels a little better with it: Side effects have been explained to them:

## 2018-03-09 NOTE — PROGRESS NOTE ADULT - PROBLEM SELECTOR PLAN 1
- Multifactorial due to influenza (now s/p Tamiflu) and underlying COPD, pulmonary fibrosis.  No additional means of therapy will reverse his end stage lung disease  - Supplemental oxygen to maintain O2 sat >88% on PO steroids (on taper).   - BiPAP QHS

## 2018-03-09 NOTE — PROGRESS NOTE ADULT - PROBLEM SELECTOR PLAN 2
- Pt remains severely volume overloaded  - on dobutamine infusion - HD/UF per renal (additional session for today)  - As per cardio, no indication for TTE with bubble for R to L shunt eval because patient is not candidate for possible PFO repair at this time.  - Pt unable to take BB or ACE/ARB as he is chronically hypotensive on Midodrine

## 2018-03-09 NOTE — PROGRESS NOTE ADULT - PROBLEM SELECTOR PLAN 1
has underlying pulmonary fibrosis and respiratory failure precipitated by influenza:   Titrate fio2 to keep o2 sat >=90% remains on 50% VM  NIV QHS/PRV  2/28: cont NIV at night and daytime too  3/1: says his breathing is better: would cont to use steroids as well as bipap at night time: Cont oxygen  to keep sao2 above 88%:  3/2: doing reasonable: cont current care:  3/3: decrease  steroids to 15 mg a day  3/4: given risk of increasing retention of salt and water , and he has been on dobutamine: would cut it down to 10 mg tomorrow and slowly taper it to off  3/5: It is very hard to determine whether steroids are beneficially improving his SOB: I doubt that steroids are of much help here: They may also potentiate salt and water retention! Will taper them off!  3/6: today sister says she would like to continue steroids for sometime : would continue for now and taper slowly: May be send home on 10 mg of steroids:  3/7: doing ok : cont current care: Has CMP and is on Dobutamine Drip  3/8: on dobutamine  3/9: cont dobutamine and night time BiPAP

## 2018-03-09 NOTE — GOALS OF CARE CONVERSATION - PERSONAL ADVANCE DIRECTIVE - CONVERSATION DETAILS
Pt called me back into his room after our initial conversation from this morning.  Pt now states that he was confused with all the wording and states that he wishes to remain FULL CODE.  I explained to him that in the event of intubation or CPR, his quality of life will be severely diminished and that he will likely be fully dependent on a ventilator, and he says, "Yes, do it.  I want everything done to keep me alive."  DNR/DNI has been rescinded. Pt called me back into his room after our initial conversation from this morning.  Pt now states that he was confused with all the wording and states that he wishes to remain FULL CODE.  I explained to him that in the event of intubation or CPR, his quality of life will be severely diminished and that he will likely be fully dependent on a ventilator, and he says, "Yes, do it.  I want everything done to keep me alive."  DNR/DNI has been rescinded.    Left message with wife informing her of this decision and assigning his sister as the primary health care proxy.

## 2018-03-09 NOTE — PROGRESS NOTE ADULT - ASSESSMENT
62 year old man with ESRD (HD MWF), NICM (EF 21%) s/p ICD, PICC line in place with home dobutamine drip, atrial fibrillation on coumadin, COPD on home O2 (4-5L), pulmonary fibrosis, hypotension on midodrine admitted with SOB secondary to influenza B infection. Renal following for HD, vol management    labs reviewed

## 2018-03-09 NOTE — PROGRESS NOTE ADULT - PROBLEM SELECTOR PLAN 8
- Please refer to my [facility] note from today  - Pt wishes to remain full code; states that even if he's dependent on machines, he wants to do everything to stay alive.  - Very grave prognosis, pt may decompensate this admission.  Family fully aware of pt's decision.

## 2018-03-09 NOTE — PROGRESS NOTE ADULT - SUBJECTIVE AND OBJECTIVE BOX
Patient is a 62y old  Male who presents with a chief complaint of SOB x few hours (20 Feb 2018 18:05)      SUBJECTIVE / OVERNIGHT EVENTS: Pt appears the same; chronically breathless, difficult to speak in full sentences, sitting at edge of bed, unable to lie flat. Denies chest pain, consistently having 3-4 beats of NSVT on telemetry.        MEDICATIONS  (STANDING):  ALBUTerol/ipratropium for Nebulization 3 milliLiter(s) Nebulizer every 6 hours  amiodarone    Tablet 100 milliGRAM(s) Oral daily  aspirin enteric coated 81 milliGRAM(s) Oral daily  atorvastatin 80 milliGRAM(s) Oral at bedtime  buDESOnide   90 MICROgram(s) Inhaler 2 Puff(s) Inhalation two times a day  dextrose 5%. 1000 milliLiter(s) (50 mL/Hr) IV Continuous <Continuous>  dextrose 5%. 1000 milliLiter(s) (50 mL/Hr) IV Continuous <Continuous>  dextrose 50% Injectable 12.5 Gram(s) IV Push once  dextrose 50% Injectable 25 Gram(s) IV Push once  dextrose 50% Injectable 25 Gram(s) IV Push once  DOBUTamine Infusion 7.5 MICROgram(s)/kG/Min (16.942 mL/Hr) IV Continuous <Continuous>  docusate sodium 100 milliGRAM(s) Oral two times a day  finasteride 5 milliGRAM(s) Oral daily  influenza   Vaccine 0.5 milliLiter(s) IntraMuscular once  insulin glargine Injectable (LANTUS) 20 Unit(s) SubCutaneous at bedtime  insulin lispro (HumaLOG) corrective regimen sliding scale   SubCutaneous three times a day before meals  insulin lispro (HumaLOG) corrective regimen sliding scale   SubCutaneous at bedtime  insulin lispro Injectable (HumaLOG) 8 Unit(s) SubCutaneous three times a day before meals  levothyroxine 75 MICROGram(s) Oral daily  midodrine 30 milliGRAM(s) Oral three times a day  MIRACLE MOUTHWASH 30 milliLiter(s) Swish and Spit three times a day  Nephro-lynda 1 Tablet(s) Oral daily  polyethylene glycol 3350 17 Gram(s) Oral daily  predniSONE   Tablet 10 milliGRAM(s) Oral daily  Saliva Substitute (CAPHOSOL) 30 milliLiter(s) Swish and Spit every 6 hours  senna 2 Tablet(s) Oral at bedtime  warfarin 1 milliGRAM(s) Oral once    MEDICATIONS  (PRN):  benzocaine 15 mG/menthol 3.6 mG Lozenge 1 Lozenge Oral every 6 hours PRN Sore Throat  dextrose Gel 1 Dose(s) Oral once PRN Blood Glucose LESS THAN 70 milliGRAM(s)/deciLiter  dextrose Gel 1 Dose(s) Oral once PRN Blood Glucose LESS THAN 70 milliGRAM(s)/deciliter  glucagon  Injectable 1 milliGRAM(s) IntraMuscular once PRN Glucose <70 milliGRAM(s)/deciLiter  guaiFENesin    Syrup 100 milliGRAM(s) Oral every 6 hours PRN Cough  sodium chloride 0.65% Nasal 1 Spray(s) Both Nostrils every 2 hours PRN Nasal Congestion      Vital Signs Last 24 Hrs  T(C): 36.6 (09 Mar 2018 09:05), Max: 36.7 (08 Mar 2018 21:36)  T(F): 97.8 (09 Mar 2018 09:05), Max: 98.1 (09 Mar 2018 05:07)  HR: 84 (09 Mar 2018 10:58) (61 - 90)  BP: 117/79 (09 Mar 2018 09:05) (105/68 - 118/74)  BP(mean): --  RR: 20 (09 Mar 2018 09:05) (18 - 20)  SpO2: 91% (09 Mar 2018 10:58) (88% - 98%)  CAPILLARY BLOOD GLUCOSE      POCT Blood Glucose.: 379 mg/dL (09 Mar 2018 12:46)  POCT Blood Glucose.: 256 mg/dL (09 Mar 2018 09:06)  POCT Blood Glucose.: 157 mg/dL (09 Mar 2018 05:12)  POCT Blood Glucose.: 182 mg/dL (08 Mar 2018 21:32)  POCT Blood Glucose.: 195 mg/dL (08 Mar 2018 17:12)  POCT Blood Glucose.: 245 mg/dL (08 Mar 2018 13:53)    I&O's Summary    08 Mar 2018 07:01  -  09 Mar 2018 07:00  --------------------------------------------------------  IN: 500 mL / OUT: 3000 mL / NET: -2500 mL          PHYSICAL EXAM  GENERAL: Moderate respiratory distress, well-developed  HEAD:  Atraumatic, Normocephalic  EYES: EOMI, PERRLA, conjunctiva and sclera clear  NECK: +JVD  CHEST/LUNG: +bibasilar crackles   HEART: Regular rate and rhythm; No murmurs, rubs, or gallops  ABDOMEN: Soft, Nontender, Nondistended; Bowel sounds present  EXTREMITIES: 3+ pitting edema in b/l LE   PSYCH: AAOx3  SKIN: No rashes or lesions    LABS:                        10.7   10.42 )-----------( 113      ( 09 Mar 2018 06:35 )             33.9     03-09    133<L>  |  93<L>  |  56<H>  ----------------------------<  234<H>  4.0   |  24  |  4.62<H>    Ca    8.8      09 Mar 2018 06:35  Mg     2.0     03-09      PT/INR - ( 09 Mar 2018 06:35 )   PT: 25.2 SEC;   INR: 2.16          PTT - ( 08 Mar 2018 06:23 )  PTT:43.1 SEC        Consultant(s) Notes Reviewed: Renal, Pulm, Palliative      Care Discussed with Consultants/Other Providers: Renal, Palliative

## 2018-03-09 NOTE — GOALS OF CARE CONVERSATION - PERSONAL ADVANCE DIRECTIVE - NAME OF PERSON WHO ASSISTED
Pt appointed his spouse, Otto Plascencia as his primary proxy [phone] and sister Juanpablo Plascencia [phone] as his alternate proxy. Pt has now appointed his sister Juanpablo Plascencia [phone] as his primary proxy and spouse, Otto Plascencia as his secondary proxy [phone].

## 2018-03-09 NOTE — PROGRESS NOTE ADULT - SUBJECTIVE AND OBJECTIVE BOX
Patient is a 62y old  Male who presents with a chief complaint of SOB x few hours (20 Feb 2018 18:05)      Any change in ROS: feels SOB   no cough   no phlegm       MEDICATIONS  (STANDING):  ALBUTerol/ipratropium for Nebulization 3 milliLiter(s) Nebulizer every 6 hours  amiodarone    Tablet 100 milliGRAM(s) Oral daily  aspirin enteric coated 81 milliGRAM(s) Oral daily  atorvastatin 80 milliGRAM(s) Oral at bedtime  buDESOnide   90 MICROgram(s) Inhaler 2 Puff(s) Inhalation two times a day  dextrose 5%. 1000 milliLiter(s) (50 mL/Hr) IV Continuous <Continuous>  dextrose 5%. 1000 milliLiter(s) (50 mL/Hr) IV Continuous <Continuous>  dextrose 50% Injectable 12.5 Gram(s) IV Push once  dextrose 50% Injectable 25 Gram(s) IV Push once  dextrose 50% Injectable 25 Gram(s) IV Push once  DOBUTamine Infusion 7.5 MICROgram(s)/kG/Min (16.942 mL/Hr) IV Continuous <Continuous>  docusate sodium 100 milliGRAM(s) Oral two times a day  finasteride 5 milliGRAM(s) Oral daily  influenza   Vaccine 0.5 milliLiter(s) IntraMuscular once  insulin glargine Injectable (LANTUS) 18 Unit(s) SubCutaneous at bedtime  insulin lispro (HumaLOG) corrective regimen sliding scale   SubCutaneous three times a day before meals  insulin lispro (HumaLOG) corrective regimen sliding scale   SubCutaneous at bedtime  insulin lispro Injectable (HumaLOG) 5 Unit(s) SubCutaneous three times a day before meals  levothyroxine 75 MICROGram(s) Oral daily  midodrine 30 milliGRAM(s) Oral three times a day  MIRACLE MOUTHWASH 30 milliLiter(s) Swish and Spit three times a day  Nephro-lynda 1 Tablet(s) Oral daily  polyethylene glycol 3350 17 Gram(s) Oral daily  predniSONE   Tablet 10 milliGRAM(s) Oral daily  Saliva Substitute (CAPHOSOL) 30 milliLiter(s) Swish and Spit every 6 hours  senna 2 Tablet(s) Oral at bedtime  warfarin 1 milliGRAM(s) Oral once    MEDICATIONS  (PRN):  benzocaine 15 mG/menthol 3.6 mG Lozenge 1 Lozenge Oral every 6 hours PRN Sore Throat  dextrose Gel 1 Dose(s) Oral once PRN Blood Glucose LESS THAN 70 milliGRAM(s)/deciLiter  dextrose Gel 1 Dose(s) Oral once PRN Blood Glucose LESS THAN 70 milliGRAM(s)/deciliter  glucagon  Injectable 1 milliGRAM(s) IntraMuscular once PRN Glucose <70 milliGRAM(s)/deciLiter  guaiFENesin    Syrup 100 milliGRAM(s) Oral every 6 hours PRN Cough  sodium chloride 0.65% Nasal 1 Spray(s) Both Nostrils every 2 hours PRN Nasal Congestion    Vital Signs Last 24 Hrs  T(C): 36.6 (09 Mar 2018 09:05), Max: 36.7 (08 Mar 2018 21:36)  T(F): 97.8 (09 Mar 2018 09:05), Max: 98.1 (09 Mar 2018 05:07)  HR: 84 (09 Mar 2018 10:58) (61 - 90)  BP: 117/79 (09 Mar 2018 09:05) (105/68 - 118/74)  BP(mean): --  RR: 20 (09 Mar 2018 09:05) (18 - 20)  SpO2: 91% (09 Mar 2018 10:58) (88% - 98%)    I&O's Summary    08 Mar 2018 07:01  -  09 Mar 2018 07:00  --------------------------------------------------------  IN: 500 mL / OUT: 3000 mL / NET: -2500 mL          Physical Exam:   GENERAL: NAD, well-groomed, well-developed  HEENT: BELL/   Atraumatic, Normocephalic  ENMT: No tonsillar erythema, exudates, or enlargement; Moist mucous membranes, Good dentition, No lesions  NECK: Supple, No JVD, Normal thyroid  CHEST/LUNG: Decreased air entry bilaterally   CVS: Regular rate and rhythm; No murmurs, rubs, or gallops  GI: : Soft, Nontender, Nondistended; Bowel sounds present  NERVOUS SYSTEM:  Alert & Oriented X3, Good concentration; Motor Strength 5/5 B/L upper and lower extremities; DTRs 2+ intact and symmetric  EXTREMITIES:  2+  edema  LYMPH: No lymphadenopathy noted  SKIN: No rashes or lesions  ENDOCRINOLOGY: No Thyromegaly  PSYCH: Appropriate    Labs:                              10.7   10.42 )-----------( 113      ( 09 Mar 2018 06:35 )             33.9                         10.5   9.98  )-----------( 105      ( 08 Mar 2018 06:23 )             32.1                         10.9   9.84  )-----------( 101      ( 07 Mar 2018 06:00 )             33.1                         10.6   10.78 )-----------( 128      ( 06 Mar 2018 05:10 )             33.8     03-09    133<L>  |  93<L>  |  56<H>  ----------------------------<  234<H>  4.0   |  24  |  4.62<H>  03-08    134<L>  |  92<L>  |  73<H>  ----------------------------<  169<H>  4.6   |  22  |  5.91<H>  03-07    132<L>  |  90<L>  |  55<H>  ----------------------------<  150<H>  4.3   |  25  |  5.04<H>  03-06    131<L>  |  91<L>  |  86<H>  ----------------------------<  177<H>  4.5   |  23  |  6.66<H>    Ca    8.8      09 Mar 2018 06:35  Ca    8.8      08 Mar 2018 06:23  Mg     2.0     03-09  Mg     1.9     03-08      CAPILLARY BLOOD GLUCOSE      POCT Blood Glucose.: 256 mg/dL (09 Mar 2018 09:06)  POCT Blood Glucose.: 157 mg/dL (09 Mar 2018 05:12)  POCT Blood Glucose.: 182 mg/dL (08 Mar 2018 21:32)  POCT Blood Glucose.: 195 mg/dL (08 Mar 2018 17:12)  POCT Blood Glucose.: 245 mg/dL (08 Mar 2018 13:53)        PT/INR - ( 09 Mar 2018 06:35 )   PT: 25.2 SEC;   INR: 2.16          PTT - ( 08 Mar 2018 06:23 )  PTT:43.1 SEC    Cultures:                 < from: Xray Chest 1 View- PORTABLE-Urgent (02.26.18 @ 11:27) >  INTERPRETATION:  CLINICAL INFORMATION: Shortness of breath.   Cardiomyopathy.    TECHNIQUE: AP chest radiograph    COMPARISON: Chest radiograph performed 2/17/2018. CT abdomen and pelvis   performed 10/30/2017.    FINDINGS:    A right-sided dialysis catheter terminates over SVC. There is a   left-sided cardiac device. There is redemonstration of diffuse   interstitial and reticular opacities bilaterally, mildly improved since   2/17/2018. There are small bilateral pleural effusions.    IMPRESSION:    Persistent diffuse interstitial and reticular lung opacities bilaterally,   mildly improved since 2/17/2018, which may represent pulmonary edema   superimposed on patient's known interstitial lung disease.    Small bilateral pleural effusions.              ANNIA MURPHY M.D., RADIOLOGY RESIDENT  This document has been electronically signed.  NITIN MAHARAJ M.D. ATTENDING RADIOLOGIST  This document has been electronically signed. Feb 26 2018  2:54PM        < end of copied text >              Studies  Chest X-RAY  CT SCAN Chest   Venous Dopplers: LE:   CT Abdomen  Others

## 2018-03-09 NOTE — PROGRESS NOTE ADULT - PROBLEM SELECTOR PLAN 6
monitor blood glucose: defer to primary care:  3/1: blood glucose is  uncontrolled: ? secondary to steroids: would defer to endo  3/2: still uncontrolled: defer to primary care:  3/6: Blood glucose in 200-300's: Defer to primary  3/9: Reasonable

## 2018-03-09 NOTE — PROGRESS NOTE ADULT - PROBLEM SELECTOR PLAN 5
HR controlled  ac as per cards/medicine  management as per cards  3/1: INR is therapeutic:  3/3: INR is therapeutic  3/5: INR therapeutic::  3/9: INR is therapeutic:

## 2018-03-09 NOTE — PROGRESS NOTE ADULT - PROBLEM SELECTOR PLAN 3
titrate oxygen to keep o2 sat>=90%  c/w rowenaonefrancie and pulmicort  2/26 began prednisone 20mg po daily  2/28: cont steroids to see how he responds to it in next few days  3/1: cont steroids as well as BD  3/2: cont steroids  3/3:major issue is pulmonary fibrosis: cont current care:  3/14: cont current care:  3/5: can change pulmicort to symbicort:  3/6: cont low dose steroids  3/7: on steroids  3/8: steoids taper  3/9: Cont 10 mg of steroids at this time

## 2018-03-09 NOTE — GOALS OF CARE CONVERSATION - PERSONAL ADVANCE DIRECTIVE - NAME OF PERSON WHO ASSISTED
Pt appointed his spouse, Otto Plascencia as his primary proxy [phone] and sister Juanpablo Plascencia [phone] as his alternate proxy.

## 2018-03-09 NOTE — PROGRESS NOTE ADULT - ASSESSMENT
62 M PMH ESRD on HD, NICM with severe systolic dysfunction (EF 21%), on chronic dobutamine gtt via LUE PICC, also with AICD, chronic hypotension on midodrine 30mg TID, atrial fibrillation on warfarin, COPD, pulmonary fibrosis on home O2, initially presented with acute on chronic respiratory failure in setting of influenza B infection. He was managed in the CCU on norepinephrine - transition to dobutamine/midodrine.  Pt's respiratory status remains very tenuous, requiring additional sessions of HD for fluid removal.  Extensive family meeting 3/8, pt wants to remain full code.

## 2018-03-09 NOTE — PROGRESS NOTE ADULT - PROBLEM SELECTOR PLAN 4
- FS glucose elevated, likely because of prednisone.   - lantus increased to 20u QHS and increased pre-meal insulin to 8U TID today, will continue to monitor for next 24 hours

## 2018-03-09 NOTE — GOALS OF CARE CONVERSATION - PERSONAL ADVANCE DIRECTIVE - CONVERSATION DETAILS
Upon examination today, the patient himself spontaneously brought up code status.  After much thought from yesterday's advanced care planning discussion, the patient now does NOT want CPR or intubation done.  In his own words, "if the doctors don't recommend it, and if it's not going to do me any good, I don't want none of that done."  I clarified again with the patient and he clearly stated, no CPR and no tube.      I called and left message with primary proxy (wife Otto Plascencia) and spoke with secondary proxy (sister Irma Plascencia)

## 2018-03-09 NOTE — CHART NOTE - NSCHARTNOTEFT_GEN_A_CORE
Called to the patient's room by staff to address the patient's desire to address ACP.    Pt decided, of his own volition, to have his sister Juanpablo be his primary HCP and his wife Otto to be his secondary HCP

## 2018-03-09 NOTE — PROGRESS NOTE ADULT - PROBLEM SELECTOR PLAN 1
had HD yesterday, 2.5 litres removed  plan for PUF today, 2L as tolearted and HD tomorrow w/2k bath, uf 2.5kg as tolearted  c/w midodrine  renal diet, fluid restriction

## 2018-03-09 NOTE — PROGRESS NOTE ADULT - SUBJECTIVE AND OBJECTIVE BOX
Pt seen and examined at bedside    s/p dialysis yesterday. tolerated dialysis well  feels fairly well  c/o dyspnea earlier today, s/p NRB used for 10min per pt, and SOB improved per pt  no chest pain,dizziness  no vomiting, diarrhea    Allergies:  No Known Allergies    Hospital Medications:   MEDICATIONS  (STANDING):  ALBUTerol/ipratropium for Nebulization 3 milliLiter(s) Nebulizer every 6 hours  amiodarone    Tablet 100 milliGRAM(s) Oral daily  aspirin enteric coated 81 milliGRAM(s) Oral daily  atorvastatin 80 milliGRAM(s) Oral at bedtime  buDESOnide   90 MICROgram(s) Inhaler 2 Puff(s) Inhalation two times a day  dextrose 5%. 1000 milliLiter(s) (50 mL/Hr) IV Continuous <Continuous>  dextrose 5%. 1000 milliLiter(s) (50 mL/Hr) IV Continuous <Continuous>  dextrose 50% Injectable 12.5 Gram(s) IV Push once  dextrose 50% Injectable 25 Gram(s) IV Push once  dextrose 50% Injectable 25 Gram(s) IV Push once  DOBUTamine Infusion 7.5 MICROgram(s)/kG/Min (16.942 mL/Hr) IV Continuous <Continuous>  docusate sodium 100 milliGRAM(s) Oral two times a day  finasteride 5 milliGRAM(s) Oral daily  influenza   Vaccine 0.5 milliLiter(s) IntraMuscular once  insulin glargine Injectable (LANTUS) 18 Unit(s) SubCutaneous at bedtime  insulin lispro (HumaLOG) corrective regimen sliding scale   SubCutaneous three times a day before meals  insulin lispro (HumaLOG) corrective regimen sliding scale   SubCutaneous at bedtime  insulin lispro Injectable (HumaLOG) 5 Unit(s) SubCutaneous three times a day before meals  levothyroxine 75 MICROGram(s) Oral daily  midodrine 30 milliGRAM(s) Oral three times a day  MIRACLE MOUTHWASH 30 milliLiter(s) Swish and Spit three times a day  Nephro-lynda 1 Tablet(s) Oral daily  polyethylene glycol 3350 17 Gram(s) Oral daily  predniSONE   Tablet 10 milliGRAM(s) Oral daily  Saliva Substitute (CAPHOSOL) 30 milliLiter(s) Swish and Spit every 6 hours  senna 2 Tablet(s) Oral at bedtime      VITALS:  Vital Signs Last 24 Hrs  T(C): 36.8 (09 Mar 2018 13:05), Max: 36.8 (09 Mar 2018 13:05)  T(F): 98.3 (09 Mar 2018 13:05), Max: 98.3 (09 Mar 2018 13:05)  HR: 81 (09 Mar 2018 13:05) (61 - 90)  BP: 120/75 (09 Mar 2018 13:05) (105/68 - 120/75)  BP(mean): --  RR: 18 (09 Mar 2018 13:05) (18 - 20)  SpO2: 95% (09 Mar 2018 13:05) (88% - 98%)    I&O's Summary    08 Mar 2018 07:01  -  09 Mar 2018 07:00  --------------------------------------------------------  IN: 500 mL / OUT: 3000 mL / NET: -2500 mL      PHYSICAL EXAM:  Constitutional: NAD, sitting comfortably in bed with nasal O2  HEENT: anicteric sclera, oropharynx clear, MMM  Neck: No JVD  Respiratory: occ crepts rt base. left lung clear  Cardiovascular: S1, S2,irreg  Gastrointestinal: BS+, soft, NT/ND  Extremities: No cyanosis or clubbing. 2+ leg edema b/l  Neurological: A/O x 3, no focal deficits  Psychiatric: Normal mood, normal affect  : No CVA tenderness. No rosa.   Skin: No rashes  Vascular Access: IJ PC+    LABS:  03-09    133<L>  |  93<L>  |  56<H>  ----------------------------<  234<H>  4.0   |  24  |  4.62<H>    Ca    8.8      09 Mar 2018 06:35  Mg     2.0     03-09                          10.7   10.42 )-----------( 113      ( 09 Mar 2018 06:35 )             33.9       Urine Studies:      RADIOLOGY & ADDITIONAL STUDIES:

## 2018-03-10 LAB
BUN SERPL-MCNC: 73 MG/DL — HIGH (ref 7–23)
CALCIUM SERPL-MCNC: 9.1 MG/DL — SIGNIFICANT CHANGE UP (ref 8.4–10.5)
CHLORIDE SERPL-SCNC: 90 MMOL/L — LOW (ref 98–107)
CO2 SERPL-SCNC: 26 MMOL/L — SIGNIFICANT CHANGE UP (ref 22–31)
CREAT SERPL-MCNC: 6.03 MG/DL — HIGH (ref 0.5–1.3)
GLUCOSE SERPL-MCNC: 176 MG/DL — HIGH (ref 70–99)
HCT VFR BLD CALC: 35.6 % — LOW (ref 39–50)
HGB BLD-MCNC: 11.2 G/DL — LOW (ref 13–17)
INR BLD: 1.74 — HIGH (ref 0.88–1.17)
MAGNESIUM SERPL-MCNC: 2 MG/DL — SIGNIFICANT CHANGE UP (ref 1.6–2.6)
MCHC RBC-ENTMCNC: 30.9 PG — SIGNIFICANT CHANGE UP (ref 27–34)
MCHC RBC-ENTMCNC: 31.5 % — LOW (ref 32–36)
MCV RBC AUTO: 98.1 FL — SIGNIFICANT CHANGE UP (ref 80–100)
NRBC # FLD: 0 — SIGNIFICANT CHANGE UP
PLATELET # BLD AUTO: 126 K/UL — LOW (ref 150–400)
PMV BLD: 13.6 FL — HIGH (ref 7–13)
POTASSIUM SERPL-MCNC: 4.1 MMOL/L — SIGNIFICANT CHANGE UP (ref 3.5–5.3)
POTASSIUM SERPL-SCNC: 4.1 MMOL/L — SIGNIFICANT CHANGE UP (ref 3.5–5.3)
PROTHROM AB SERPL-ACNC: 19.5 SEC — HIGH (ref 9.8–13.1)
RBC # BLD: 3.63 M/UL — LOW (ref 4.2–5.8)
RBC # FLD: 18.9 % — HIGH (ref 10.3–14.5)
SODIUM SERPL-SCNC: 134 MMOL/L — LOW (ref 135–145)
WBC # BLD: 10.96 K/UL — HIGH (ref 3.8–10.5)
WBC # FLD AUTO: 10.96 K/UL — HIGH (ref 3.8–10.5)

## 2018-03-10 PROCEDURE — 99233 SBSQ HOSP IP/OBS HIGH 50: CPT

## 2018-03-10 RX ORDER — WARFARIN SODIUM 2.5 MG/1
4 TABLET ORAL ONCE
Qty: 0 | Refills: 0 | Status: COMPLETED | OUTPATIENT
Start: 2018-03-10 | End: 2018-03-10

## 2018-03-10 RX ADMIN — WARFARIN SODIUM 4 MILLIGRAM(S): 2.5 TABLET ORAL at 22:27

## 2018-03-10 RX ADMIN — Medication 2 PUFF(S): at 09:02

## 2018-03-10 RX ADMIN — Medication 75 MICROGRAM(S): at 06:00

## 2018-03-10 RX ADMIN — Medication 30 MILLILITER(S): at 13:03

## 2018-03-10 RX ADMIN — Medication 3: at 13:01

## 2018-03-10 RX ADMIN — Medication 30 MILLILITER(S): at 06:00

## 2018-03-10 RX ADMIN — Medication 1 TABLET(S): at 13:01

## 2018-03-10 RX ADMIN — Medication 81 MILLIGRAM(S): at 13:01

## 2018-03-10 RX ADMIN — Medication 8 UNIT(S): at 09:01

## 2018-03-10 RX ADMIN — Medication 100 MILLIGRAM(S): at 06:01

## 2018-03-10 RX ADMIN — Medication 3 MILLILITER(S): at 15:35

## 2018-03-10 RX ADMIN — Medication 16.94 MICROGRAM(S)/KG/MIN: at 16:27

## 2018-03-10 RX ADMIN — Medication 10 MILLIGRAM(S): at 06:01

## 2018-03-10 RX ADMIN — Medication 2: at 09:01

## 2018-03-10 RX ADMIN — MIDODRINE HYDROCHLORIDE 30 MILLIGRAM(S): 2.5 TABLET ORAL at 06:02

## 2018-03-10 RX ADMIN — ATORVASTATIN CALCIUM 80 MILLIGRAM(S): 80 TABLET, FILM COATED ORAL at 22:26

## 2018-03-10 RX ADMIN — Medication 3 MILLILITER(S): at 22:52

## 2018-03-10 RX ADMIN — Medication 3 MILLILITER(S): at 10:23

## 2018-03-10 RX ADMIN — AMIODARONE HYDROCHLORIDE 100 MILLIGRAM(S): 400 TABLET ORAL at 06:00

## 2018-03-10 RX ADMIN — Medication 2 PUFF(S): at 22:27

## 2018-03-10 RX ADMIN — DIPHENHYDRAMINE HYDROCHLORIDE AND LIDOCAINE HYDROCHLORIDE AND ALUMINUM HYDROXIDE AND MAGNESIUM HYDRO 30 MILLILITER(S): KIT at 06:01

## 2018-03-10 RX ADMIN — DIPHENHYDRAMINE HYDROCHLORIDE AND LIDOCAINE HYDROCHLORIDE AND ALUMINUM HYDROXIDE AND MAGNESIUM HYDRO 30 MILLILITER(S): KIT at 22:27

## 2018-03-10 RX ADMIN — DIPHENHYDRAMINE HYDROCHLORIDE AND LIDOCAINE HYDROCHLORIDE AND ALUMINUM HYDROXIDE AND MAGNESIUM HYDRO 30 MILLILITER(S): KIT at 13:38

## 2018-03-10 RX ADMIN — FINASTERIDE 5 MILLIGRAM(S): 5 TABLET, FILM COATED ORAL at 13:02

## 2018-03-10 RX ADMIN — MIDODRINE HYDROCHLORIDE 30 MILLIGRAM(S): 2.5 TABLET ORAL at 13:03

## 2018-03-10 RX ADMIN — Medication 8 UNIT(S): at 13:01

## 2018-03-10 RX ADMIN — Medication 3 MILLILITER(S): at 03:24

## 2018-03-10 RX ADMIN — INSULIN GLARGINE 20 UNIT(S): 100 INJECTION, SOLUTION SUBCUTANEOUS at 22:26

## 2018-03-10 RX ADMIN — MIDODRINE HYDROCHLORIDE 30 MILLIGRAM(S): 2.5 TABLET ORAL at 22:27

## 2018-03-10 NOTE — PROGRESS NOTE ADULT - PROBLEM SELECTOR PLAN 1
s/p HD on 3/8 - s/p uf session on 3/9- plan for HD today- 2k bath and uf 2kg as tolerated  c/w midodrine  renal diet, fluid restriction  trend bmp

## 2018-03-10 NOTE — PROGRESS NOTE ADULT - PROBLEM SELECTOR PLAN 8
- Pt wishes to remain full code; states that even if he's dependent on machines, he wants to do everything to stay alive.  - Very grave prognosis, pt may decompensate this admission.  Family fully aware of pt's decision.

## 2018-03-10 NOTE — PROGRESS NOTE ADULT - PROBLEM SELECTOR PLAN 3
titrate oxygen to keep o2 sat>=90%  c/w duonefrancie and pulmicort  2/26 began prednisone 20mg po daily  2/28: cont steroids to see how he responds to it in next few days  3/1: cont steroids as well as BD  3/2: cont steroids  3/3:major issue is pulmonary fibrosis: cont current care:  3/14: cont current care:  3/5: can change pulmicort to symbicort:  3/6: cont low dose steroids  3/7: on steroids  3/8: steoids taper  3/9: Cont 10 mg of steroids at this time  3/10: cont 10 mg , will change to 5 mg soon

## 2018-03-10 NOTE — PROGRESS NOTE ADULT - SUBJECTIVE AND OBJECTIVE BOX
Patient seen and examined  no complaints    No Known Allergies    Hospital Medications:   MEDICATIONS  (STANDING):  ALBUTerol/ipratropium for Nebulization 3 milliLiter(s) Nebulizer every 6 hours  amiodarone    Tablet 100 milliGRAM(s) Oral daily  aspirin enteric coated 81 milliGRAM(s) Oral daily  atorvastatin 80 milliGRAM(s) Oral at bedtime  buDESOnide   90 MICROgram(s) Inhaler 2 Puff(s) Inhalation two times a day  dextrose 5%. 1000 milliLiter(s) (50 mL/Hr) IV Continuous <Continuous>  dextrose 5%. 1000 milliLiter(s) (50 mL/Hr) IV Continuous <Continuous>  dextrose 50% Injectable 12.5 Gram(s) IV Push once  dextrose 50% Injectable 25 Gram(s) IV Push once  dextrose 50% Injectable 25 Gram(s) IV Push once  DOBUTamine Infusion 7.5 MICROgram(s)/kG/Min (16.942 mL/Hr) IV Continuous <Continuous>  docusate sodium 100 milliGRAM(s) Oral two times a day  finasteride 5 milliGRAM(s) Oral daily  influenza   Vaccine 0.5 milliLiter(s) IntraMuscular once  insulin glargine Injectable (LANTUS) 20 Unit(s) SubCutaneous at bedtime  insulin lispro (HumaLOG) corrective regimen sliding scale   SubCutaneous three times a day before meals  insulin lispro (HumaLOG) corrective regimen sliding scale   SubCutaneous at bedtime  insulin lispro Injectable (HumaLOG) 8 Unit(s) SubCutaneous three times a day before meals  levothyroxine 75 MICROGram(s) Oral daily  midodrine 30 milliGRAM(s) Oral three times a day  MIRACLE MOUTHWASH 30 milliLiter(s) Swish and Spit three times a day  Nephro-lynda 1 Tablet(s) Oral daily  polyethylene glycol 3350 17 Gram(s) Oral daily  predniSONE   Tablet   Oral   Saliva Substitute (CAPHOSOL) 30 milliLiter(s) Swish and Spit every 6 hours  senna 2 Tablet(s) Oral at bedtime  warfarin 4 milliGRAM(s) Oral once      VITALS:  T(F): 97.6 (03-10-18 @ 05:58), Max: 98.3 (03-09-18 @ 13:05)  HR: 92 (03-10-18 @ 10:24)  BP: 117/75 (03-10-18 @ 05:58)  RR: 18 (03-10-18 @ 05:58)  SpO2: 97% (03-10-18 @ 10:24)  Wt(kg): --    03-09 @ 07:01  -  03-10 @ 07:00  --------------------------------------------------------  IN: 400 mL / OUT: 2400 mL / NET: -2000 mL      PHYSICAL EXAM:  Constitutional: NAD, sitting comfortably in bed with nasal O2  HEENT: anicteric sclera, oropharynx clear, MMM  Neck: No JVD  Respiratory: occ crepts rt base. left lung clear  Cardiovascular: S1, S2,irreg  Gastrointestinal: BS+, soft, NT/ND  Extremities: No cyanosis or clubbing. 2+ leg edema b/l  Neurological: A/O x 3, no focal deficits  Psychiatric: Normal mood, normal affect  : No CVA tenderness. No rosa.   Skin: No rashes  Vascular Access: IJ PC+    LABS:  03-10    134<L>  |  90<L>  |  73<H>  ----------------------------<  176<H>  4.1   |  26  |  6.03<H>    Ca    9.1      10 Mar 2018 06:15  Mg     2.0     03-10      Creatinine Trend: 6.03 <--, 4.62 <--, 5.91 <--, 5.04 <--, 6.66 <--, 5.67 <--, 4.50 <--                        11.2   10.96 )-----------( 126      ( 10 Mar 2018 06:15 )             35.6     Urine Studies:      RADIOLOGY & ADDITIONAL STUDIES:

## 2018-03-10 NOTE — PROGRESS NOTE ADULT - PROBLEM SELECTOR PLAN 2
: supportive treatment:  titrate oxygen to keep o2 sat>=90%  2/26 began trial prednisone for sob-prednisone 20 mg po daily  2/28: started on a trial of prednisone two days ago, will monitor, however per his family, he never had a good response to previous trial of steroid.  3/1: on retrial of steroids: Pt thinks his breathing is better then before: Would like to cont steroids at this t alirio  3/2: cont steroids: pt seems to have stable SOB , does not seem to be improving further to me:  3/3: decrease steroids to 15 mg ad ay  3/4: on 15 mg today , decrease to 10 mg a day  3/5: taper off prednisone: already ordered  3*6: cont steroids  3/7: doing pretty poor: cont steroids trial for some time  3/8: on steroids at tis time: low dose steroids  3/9: It is difficult to discern if steroids are helpful in him: However the pt as well as the sister insists that we cont to try steroids: They think he feels a little better with it: Side effects have been explained to them:  3/10: cont 10 mg today too ? decrease to 5 mg soon

## 2018-03-10 NOTE — PROGRESS NOTE ADULT - PROBLEM SELECTOR PLAN 4
- FS glucose elevated, likely because of prednisone.   - lantus increased to 20u QHS and increased pre-meal insulin to 8U TID on 3/9/18 , will continue to monitor

## 2018-03-10 NOTE — PROGRESS NOTE ADULT - SUBJECTIVE AND OBJECTIVE BOX
Patient is a 62y old  Male who presents with a chief complaint of SOB x few hours (20 Feb 2018 18:05)      Any change in ROS: pt is doing ok   no SOB   no cough     MEDICATIONS  (STANDING):  ALBUTerol/ipratropium for Nebulization 3 milliLiter(s) Nebulizer every 6 hours  amiodarone    Tablet 100 milliGRAM(s) Oral daily  aspirin enteric coated 81 milliGRAM(s) Oral daily  atorvastatin 80 milliGRAM(s) Oral at bedtime  buDESOnide   90 MICROgram(s) Inhaler 2 Puff(s) Inhalation two times a day  dextrose 5%. 1000 milliLiter(s) (50 mL/Hr) IV Continuous <Continuous>  dextrose 5%. 1000 milliLiter(s) (50 mL/Hr) IV Continuous <Continuous>  dextrose 50% Injectable 12.5 Gram(s) IV Push once  dextrose 50% Injectable 25 Gram(s) IV Push once  dextrose 50% Injectable 25 Gram(s) IV Push once  DOBUTamine Infusion 7.5 MICROgram(s)/kG/Min (16.942 mL/Hr) IV Continuous <Continuous>  docusate sodium 100 milliGRAM(s) Oral two times a day  finasteride 5 milliGRAM(s) Oral daily  influenza   Vaccine 0.5 milliLiter(s) IntraMuscular once  insulin glargine Injectable (LANTUS) 20 Unit(s) SubCutaneous at bedtime  insulin lispro (HumaLOG) corrective regimen sliding scale   SubCutaneous three times a day before meals  insulin lispro (HumaLOG) corrective regimen sliding scale   SubCutaneous at bedtime  insulin lispro Injectable (HumaLOG) 8 Unit(s) SubCutaneous three times a day before meals  levothyroxine 75 MICROGram(s) Oral daily  midodrine 30 milliGRAM(s) Oral three times a day  MIRACLE MOUTHWASH 30 milliLiter(s) Swish and Spit three times a day  Nephro-lynda 1 Tablet(s) Oral daily  polyethylene glycol 3350 17 Gram(s) Oral daily  predniSONE   Tablet   Oral   Saliva Substitute (CAPHOSOL) 30 milliLiter(s) Swish and Spit every 6 hours  senna 2 Tablet(s) Oral at bedtime  warfarin 4 milliGRAM(s) Oral once    MEDICATIONS  (PRN):  benzocaine 15 mG/menthol 3.6 mG Lozenge 1 Lozenge Oral every 6 hours PRN Sore Throat  dextrose Gel 1 Dose(s) Oral once PRN Blood Glucose LESS THAN 70 milliGRAM(s)/deciLiter  dextrose Gel 1 Dose(s) Oral once PRN Blood Glucose LESS THAN 70 milliGRAM(s)/deciliter  glucagon  Injectable 1 milliGRAM(s) IntraMuscular once PRN Glucose <70 milliGRAM(s)/deciLiter  guaiFENesin    Syrup 100 milliGRAM(s) Oral every 6 hours PRN Cough  sodium chloride 0.65% Nasal 1 Spray(s) Both Nostrils every 2 hours PRN Nasal Congestion    Vital Signs Last 24 Hrs  T(C): 36.4 (10 Mar 2018 05:58), Max: 36.8 (09 Mar 2018 13:05)  T(F): 97.6 (10 Mar 2018 05:58), Max: 98.3 (09 Mar 2018 13:05)  HR: 92 (10 Mar 2018 10:24) (78 - 100)  BP: 117/75 (10 Mar 2018 05:58) (101/68 - 120/75)  BP(mean): --  RR: 18 (10 Mar 2018 05:58) (18 - 19)  SpO2: 97% (10 Mar 2018 10:24) (89% - 100%)    I&O's Summary    09 Mar 2018 07:01  -  10 Mar 2018 07:00  --------------------------------------------------------  IN: 400 mL / OUT: 2400 mL / NET: -2000 mL          Physical Exam:   GENERAL: NAD, well-groomed, well-developed  HEENT: BELL/   Atraumatic, Normocephalic  ENMT: No tonsillar erythema, exudates, or enlargement; Moist mucous membranes, Good dentition, No lesions  NECK: Supple, No JVD, Normal thyroid  CHEST/LUNG: Scattered crackles bilaterally   CVS: Regular rate and rhythm; No murmurs, rubs, or gallops  GI: : Soft, Nontender, Nondistended; Bowel sounds present  NERVOUS SYSTEM:  Alert & Oriented X3  EXTREMITIES:  2+ edema  LYMPH: No lymphadenopathy noted  SKIN: No rashes or lesions  ENDOCRINOLOGY: No Thyromegaly  PSYCH: Appropriate    Labs:                              11.2   10.96 )-----------( 126      ( 10 Mar 2018 06:15 )             35.6                         10.7   10.42 )-----------( 113      ( 09 Mar 2018 06:35 )             33.9                         10.5   9.98  )-----------( 105      ( 08 Mar 2018 06:23 )             32.1                         10.9   9.84  )-----------( 101      ( 07 Mar 2018 06:00 )             33.1     03-10    134<L>  |  90<L>  |  73<H>  ----------------------------<  176<H>  4.1   |  26  |  6.03<H>  03-09    133<L>  |  93<L>  |  56<H>  ----------------------------<  234<H>  4.0   |  24  |  4.62<H>  03-08    134<L>  |  92<L>  |  73<H>  ----------------------------<  169<H>  4.6   |  22  |  5.91<H>  03-07    132<L>  |  90<L>  |  55<H>  ----------------------------<  150<H>  4.3   |  25  |  5.04<H>    Ca    9.1      10 Mar 2018 06:15  Ca    8.8      09 Mar 2018 06:35  Mg     2.0     03-10  Mg     2.0     03-09      CAPILLARY BLOOD GLUCOSE      POCT Blood Glucose.: 222 mg/dL (10 Mar 2018 08:51)  POCT Blood Glucose.: 121 mg/dL (09 Mar 2018 23:18)  POCT Blood Glucose.: 110 mg/dL (09 Mar 2018 20:41)  POCT Blood Glucose.: 162 mg/dL (09 Mar 2018 18:50)  POCT Blood Glucose.: 182 mg/dL (09 Mar 2018 17:30)  POCT Blood Glucose.: 379 mg/dL (09 Mar 2018 12:46)        PT/INR - ( 10 Mar 2018 06:15 )   PT: 19.5 SEC;   INR: 1.74              Cultures:   < from: Xray Chest 1 View- PORTABLE-Urgent (02.26.18 @ 11:27) >    TECHNIQUE: AP chest radiograph    COMPARISON: Chest radiograph performed 2/17/2018. CT abdomen and pelvis   performed 10/30/2017.    FINDINGS:    A right-sided dialysis catheter terminates over SVC. There is a   left-sided cardiac device. There is redemonstration of diffuse   interstitial and reticular opacities bilaterally, mildly improved since   2/17/2018. There are small bilateral pleural effusions.    IMPRESSION:    Persistent diffuse interstitial and reticular lung opacities bilaterally,   mildly improved since 2/17/2018, which may represent pulmonary edema   superimposed on patient's known interstitial lung disease.    Small bilateral pleural effusions.              ANNIA MURPHY M.D., RADIOLOGY RESIDENT  This document has been electronically signed.  NITIN MAHARAJ M.D. ATTENDING RADIOLOGIST  This document has been electronically signed. Feb 26 2018  2:54PM        < end of copied text >                            Studies  Chest X-RAY  CT SCAN Chest   Venous Dopplers: LE:   CT Abdomen  Others

## 2018-03-10 NOTE — PROGRESS NOTE ADULT - SUBJECTIVE AND OBJECTIVE BOX
Patient is a 62y old  Male who presents with a chief complaint of SOB x few hours (20 Feb 2018 18:05)      SUBJECTIVE / OVERNIGHT EVENTS: patient seen and examined by bedside at 11:35 Am, breathing  is about the same, denies chest pain, no acute events over night        MEDICATIONS  (STANDING):  ALBUTerol/ipratropium for Nebulization 3 milliLiter(s) Nebulizer every 6 hours  amiodarone    Tablet 100 milliGRAM(s) Oral daily  aspirin enteric coated 81 milliGRAM(s) Oral daily  atorvastatin 80 milliGRAM(s) Oral at bedtime  buDESOnide   90 MICROgram(s) Inhaler 2 Puff(s) Inhalation two times a day  dextrose 5%. 1000 milliLiter(s) (50 mL/Hr) IV Continuous <Continuous>  dextrose 5%. 1000 milliLiter(s) (50 mL/Hr) IV Continuous <Continuous>  dextrose 50% Injectable 12.5 Gram(s) IV Push once  dextrose 50% Injectable 25 Gram(s) IV Push once  dextrose 50% Injectable 25 Gram(s) IV Push once  DOBUTamine Infusion 7.5 MICROgram(s)/kG/Min (16.942 mL/Hr) IV Continuous <Continuous>  docusate sodium 100 milliGRAM(s) Oral two times a day  finasteride 5 milliGRAM(s) Oral daily  influenza   Vaccine 0.5 milliLiter(s) IntraMuscular once  insulin glargine Injectable (LANTUS) 20 Unit(s) SubCutaneous at bedtime  insulin lispro (HumaLOG) corrective regimen sliding scale   SubCutaneous three times a day before meals  insulin lispro (HumaLOG) corrective regimen sliding scale   SubCutaneous at bedtime  insulin lispro Injectable (HumaLOG) 8 Unit(s) SubCutaneous three times a day before meals  levothyroxine 75 MICROGram(s) Oral daily  midodrine 30 milliGRAM(s) Oral three times a day  MIRACLE MOUTHWASH 30 milliLiter(s) Swish and Spit three times a day  Nephro-lynda 1 Tablet(s) Oral daily  polyethylene glycol 3350 17 Gram(s) Oral daily  predniSONE   Tablet   Oral   Saliva Substitute (CAPHOSOL) 30 milliLiter(s) Swish and Spit every 6 hours  senna 2 Tablet(s) Oral at bedtime  warfarin 4 milliGRAM(s) Oral once    MEDICATIONS  (PRN):  benzocaine 15 mG/menthol 3.6 mG Lozenge 1 Lozenge Oral every 6 hours PRN Sore Throat  dextrose Gel 1 Dose(s) Oral once PRN Blood Glucose LESS THAN 70 milliGRAM(s)/deciLiter  dextrose Gel 1 Dose(s) Oral once PRN Blood Glucose LESS THAN 70 milliGRAM(s)/deciliter  glucagon  Injectable 1 milliGRAM(s) IntraMuscular once PRN Glucose <70 milliGRAM(s)/deciLiter  guaiFENesin    Syrup 100 milliGRAM(s) Oral every 6 hours PRN Cough  sodium chloride 0.65% Nasal 1 Spray(s) Both Nostrils every 2 hours PRN Nasal Congestion      Vital Signs Last 24 Hrs  T(C): 36.4 (10 Mar 2018 05:58), Max: 36.4 (09 Mar 2018 21:39)  T(F): 97.6 (10 Mar 2018 05:58), Max: 97.6 (09 Mar 2018 21:39)  HR: 92 (10 Mar 2018 10:24) (78 - 100)  BP: 117/75 (10 Mar 2018 05:58) (101/68 - 119/70)  BP(mean): --  RR: 18 (10 Mar 2018 05:58) (18 - 19)  SpO2: 97% (10 Mar 2018 10:24) (89% - 100%)  CAPILLARY BLOOD GLUCOSE      POCT Blood Glucose.: 281 mg/dL (10 Mar 2018 12:38)  POCT Blood Glucose.: 222 mg/dL (10 Mar 2018 08:51)  POCT Blood Glucose.: 121 mg/dL (09 Mar 2018 23:18)  POCT Blood Glucose.: 110 mg/dL (09 Mar 2018 20:41)  POCT Blood Glucose.: 162 mg/dL (09 Mar 2018 18:50)  POCT Blood Glucose.: 182 mg/dL (09 Mar 2018 17:30)    I&O's Summary    09 Mar 2018 07:01  -  10 Mar 2018 07:00  --------------------------------------------------------  IN: 400 mL / OUT: 2400 mL / NET: -2000 mL        PHYSICAL EXAM  GENERAL: Moderate respiratory distress, well-developed  HEAD:  Atraumatic, Normocephalic  EYES: EOMI, PERRLA, conjunctiva and sclera clear  NECK: +JVD  CHEST/LUNG: +bibasilar crackles   HEART: Regular rate and rhythm; No murmurs, rubs, or gallops  ABDOMEN: Soft, Nontender, Nondistended; Bowel sounds present  EXTREMITIES: 3+ pitting edema in b/l LE   PSYCH: AAOx3  SKIN: No rashes or lesions        LABS:                        11.2   10.96 )-----------( 126      ( 10 Mar 2018 06:15 )             35.6     03-10    134<L>  |  90<L>  |  73<H>  ----------------------------<  176<H>  4.1   |  26  |  6.03<H>    Ca    9.1      10 Mar 2018 06:15  Mg     2.0     03-10      PT/INR - ( 10 Mar 2018 06:15 )   PT: 19.5 SEC;   INR: 1.74                    RADIOLOGY & ADDITIONAL TESTS:    Imaging Personally Reviewed:    Consultant(s) Notes Reviewed:  pulmonary, renal     Care Discussed with Consultants/Other Providers:

## 2018-03-10 NOTE — PROGRESS NOTE ADULT - PROBLEM SELECTOR PLAN 1
has underlying pulmonary fibrosis and respiratory failure precipitated by influenza:   Titrate fio2 to keep o2 sat >=90% remains on 50% VM  NIV QHS/PRV  2/28: cont NIV at night and daytime too  3/1: says his breathing is better: would cont to use steroids as well as bipap at night time: Cont oxygen  to keep sao2 above 88%:  3/2: doing reasonable: cont current care:  3/3: decrease  steroids to 15 mg a day  3/4: given risk of increasing retention of salt and water , and he has been on dobutamine: would cut it down to 10 mg tomorrow and slowly taper it to off  3/5: It is very hard to determine whether steroids are beneficially improving his SOB: I doubt that steroids are of much help here: They may also potentiate salt and water retention! Will taper them off!  3/6: today sister says she would like to continue steroids for sometime : would continue for now and taper slowly: May be send home on 10 mg of steroids:  3/7: doing ok : cont current care: Has CMP and is on Dobutamine Drip  3/8: on dobutamine  3/9: cont dobutamine and night time BiPAP  3/10: cont current treatment : breathing wise he remains tenuous:

## 2018-03-11 LAB
BUN SERPL-MCNC: 48 MG/DL — HIGH (ref 7–23)
CALCIUM SERPL-MCNC: 9 MG/DL — SIGNIFICANT CHANGE UP (ref 8.4–10.5)
CHLORIDE SERPL-SCNC: 94 MMOL/L — LOW (ref 98–107)
CO2 SERPL-SCNC: 24 MMOL/L — SIGNIFICANT CHANGE UP (ref 22–31)
CREAT SERPL-MCNC: 4.32 MG/DL — HIGH (ref 0.5–1.3)
GLUCOSE SERPL-MCNC: 158 MG/DL — HIGH (ref 70–99)
HCT VFR BLD CALC: 35.5 % — LOW (ref 39–50)
HGB BLD-MCNC: 11.4 G/DL — LOW (ref 13–17)
INR BLD: 1.55 — HIGH (ref 0.88–1.17)
MAGNESIUM SERPL-MCNC: 1.9 MG/DL — SIGNIFICANT CHANGE UP (ref 1.6–2.6)
MCHC RBC-ENTMCNC: 31.8 PG — SIGNIFICANT CHANGE UP (ref 27–34)
MCHC RBC-ENTMCNC: 32.1 % — SIGNIFICANT CHANGE UP (ref 32–36)
MCV RBC AUTO: 98.9 FL — SIGNIFICANT CHANGE UP (ref 80–100)
NRBC # FLD: 0 — SIGNIFICANT CHANGE UP
PLATELET # BLD AUTO: 130 K/UL — LOW (ref 150–400)
PMV BLD: 13.7 FL — HIGH (ref 7–13)
POTASSIUM SERPL-MCNC: 3.6 MMOL/L — SIGNIFICANT CHANGE UP (ref 3.5–5.3)
POTASSIUM SERPL-SCNC: 3.6 MMOL/L — SIGNIFICANT CHANGE UP (ref 3.5–5.3)
PROTHROM AB SERPL-ACNC: 18 SEC — HIGH (ref 9.8–13.1)
RBC # BLD: 3.59 M/UL — LOW (ref 4.2–5.8)
RBC # FLD: 19.2 % — HIGH (ref 10.3–14.5)
SODIUM SERPL-SCNC: 135 MMOL/L — SIGNIFICANT CHANGE UP (ref 135–145)
WBC # BLD: 11.64 K/UL — HIGH (ref 3.8–10.5)
WBC # FLD AUTO: 11.64 K/UL — HIGH (ref 3.8–10.5)

## 2018-03-11 PROCEDURE — 99233 SBSQ HOSP IP/OBS HIGH 50: CPT

## 2018-03-11 RX ORDER — WARFARIN SODIUM 2.5 MG/1
6 TABLET ORAL ONCE
Qty: 0 | Refills: 0 | Status: COMPLETED | OUTPATIENT
Start: 2018-03-11 | End: 2018-03-11

## 2018-03-11 RX ADMIN — WARFARIN SODIUM 6 MILLIGRAM(S): 2.5 TABLET ORAL at 17:53

## 2018-03-11 RX ADMIN — Medication 30 MILLILITER(S): at 05:36

## 2018-03-11 RX ADMIN — MIDODRINE HYDROCHLORIDE 30 MILLIGRAM(S): 2.5 TABLET ORAL at 23:17

## 2018-03-11 RX ADMIN — Medication 30 MILLILITER(S): at 13:02

## 2018-03-11 RX ADMIN — Medication 1: at 09:36

## 2018-03-11 RX ADMIN — Medication 3 MILLILITER(S): at 15:32

## 2018-03-11 RX ADMIN — AMIODARONE HYDROCHLORIDE 100 MILLIGRAM(S): 400 TABLET ORAL at 05:37

## 2018-03-11 RX ADMIN — DIPHENHYDRAMINE HYDROCHLORIDE AND LIDOCAINE HYDROCHLORIDE AND ALUMINUM HYDROXIDE AND MAGNESIUM HYDRO 30 MILLILITER(S): KIT at 23:44

## 2018-03-11 RX ADMIN — Medication 8 UNIT(S): at 09:36

## 2018-03-11 RX ADMIN — Medication 1 TABLET(S): at 13:03

## 2018-03-11 RX ADMIN — Medication 3 MILLILITER(S): at 21:37

## 2018-03-11 RX ADMIN — Medication 16.94 MICROGRAM(S)/KG/MIN: at 23:16

## 2018-03-11 RX ADMIN — ATORVASTATIN CALCIUM 80 MILLIGRAM(S): 80 TABLET, FILM COATED ORAL at 23:16

## 2018-03-11 RX ADMIN — Medication 3 MILLILITER(S): at 04:55

## 2018-03-11 RX ADMIN — MIDODRINE HYDROCHLORIDE 30 MILLIGRAM(S): 2.5 TABLET ORAL at 05:36

## 2018-03-11 RX ADMIN — Medication 8 UNIT(S): at 17:53

## 2018-03-11 RX ADMIN — Medication 3 MILLILITER(S): at 10:13

## 2018-03-11 RX ADMIN — Medication 16.94 MICROGRAM(S)/KG/MIN: at 09:37

## 2018-03-11 RX ADMIN — FINASTERIDE 5 MILLIGRAM(S): 5 TABLET, FILM COATED ORAL at 13:03

## 2018-03-11 RX ADMIN — Medication 5 MILLIGRAM(S): at 05:37

## 2018-03-11 RX ADMIN — Medication 8 UNIT(S): at 13:00

## 2018-03-11 RX ADMIN — DIPHENHYDRAMINE HYDROCHLORIDE AND LIDOCAINE HYDROCHLORIDE AND ALUMINUM HYDROXIDE AND MAGNESIUM HYDRO 30 MILLILITER(S): KIT at 13:03

## 2018-03-11 RX ADMIN — MIDODRINE HYDROCHLORIDE 30 MILLIGRAM(S): 2.5 TABLET ORAL at 13:02

## 2018-03-11 RX ADMIN — DIPHENHYDRAMINE HYDROCHLORIDE AND LIDOCAINE HYDROCHLORIDE AND ALUMINUM HYDROXIDE AND MAGNESIUM HYDRO 30 MILLILITER(S): KIT at 05:38

## 2018-03-11 RX ADMIN — Medication 81 MILLIGRAM(S): at 13:02

## 2018-03-11 RX ADMIN — Medication 100 MILLIGRAM(S): at 05:36

## 2018-03-11 RX ADMIN — Medication 2 PUFF(S): at 09:37

## 2018-03-11 RX ADMIN — Medication 75 MICROGRAM(S): at 05:36

## 2018-03-11 NOTE — PROGRESS NOTE ADULT - PROBLEM SELECTOR PLAN 1
s/p HD on 3/8 - s/p uf session on 3/9- and s/p HD 3/10 as well  currently no sob and clinically feeling better  c/w midodrine  renal diet, fluid restriction  trend bmp

## 2018-03-11 NOTE — PROGRESS NOTE ADULT - PROBLEM SELECTOR PLAN 4
hd as per renal  keep net negative as able  3/1: on HD  3/4: on HD  3/9: cont HD per renal  3/11: on HD

## 2018-03-11 NOTE — PROGRESS NOTE ADULT - SUBJECTIVE AND OBJECTIVE BOX
Patient is a 62y old  Male who presents with a chief complaint of SOB x few hours (20 Feb 2018 18:05)      Any change in ROS: pt is doing ok:  but feels SOB off and on     MEDICATIONS  (STANDING):  ALBUTerol/ipratropium for Nebulization 3 milliLiter(s) Nebulizer every 6 hours  amiodarone    Tablet 100 milliGRAM(s) Oral daily  aspirin enteric coated 81 milliGRAM(s) Oral daily  atorvastatin 80 milliGRAM(s) Oral at bedtime  buDESOnide   90 MICROgram(s) Inhaler 2 Puff(s) Inhalation two times a day  dextrose 5%. 1000 milliLiter(s) (50 mL/Hr) IV Continuous <Continuous>  dextrose 5%. 1000 milliLiter(s) (50 mL/Hr) IV Continuous <Continuous>  dextrose 50% Injectable 12.5 Gram(s) IV Push once  dextrose 50% Injectable 25 Gram(s) IV Push once  dextrose 50% Injectable 25 Gram(s) IV Push once  DOBUTamine Infusion 7.5 MICROgram(s)/kG/Min (16.942 mL/Hr) IV Continuous <Continuous>  docusate sodium 100 milliGRAM(s) Oral two times a day  finasteride 5 milliGRAM(s) Oral daily  influenza   Vaccine 0.5 milliLiter(s) IntraMuscular once  insulin glargine Injectable (LANTUS) 20 Unit(s) SubCutaneous at bedtime  insulin lispro (HumaLOG) corrective regimen sliding scale   SubCutaneous three times a day before meals  insulin lispro (HumaLOG) corrective regimen sliding scale   SubCutaneous at bedtime  insulin lispro Injectable (HumaLOG) 8 Unit(s) SubCutaneous three times a day before meals  levothyroxine 75 MICROGram(s) Oral daily  midodrine 30 milliGRAM(s) Oral three times a day  MIRACLE MOUTHWASH 30 milliLiter(s) Swish and Spit three times a day  Nephro-lynda 1 Tablet(s) Oral daily  polyethylene glycol 3350 17 Gram(s) Oral daily  predniSONE   Tablet   Oral   predniSONE   Tablet 5 milliGRAM(s) Oral daily  Saliva Substitute (CAPHOSOL) 30 milliLiter(s) Swish and Spit every 6 hours  senna 2 Tablet(s) Oral at bedtime  warfarin 6 milliGRAM(s) Oral once    MEDICATIONS  (PRN):  benzocaine 15 mG/menthol 3.6 mG Lozenge 1 Lozenge Oral every 6 hours PRN Sore Throat  dextrose Gel 1 Dose(s) Oral once PRN Blood Glucose LESS THAN 70 milliGRAM(s)/deciLiter  dextrose Gel 1 Dose(s) Oral once PRN Blood Glucose LESS THAN 70 milliGRAM(s)/deciliter  glucagon  Injectable 1 milliGRAM(s) IntraMuscular once PRN Glucose <70 milliGRAM(s)/deciLiter  guaiFENesin    Syrup 100 milliGRAM(s) Oral every 6 hours PRN Cough  sodium chloride 0.65% Nasal 1 Spray(s) Both Nostrils every 2 hours PRN Nasal Congestion    Vital Signs Last 24 Hrs  T(C): 36.3 (11 Mar 2018 04:31), Max: 36.6 (10 Mar 2018 13:37)  T(F): 97.4 (11 Mar 2018 04:31), Max: 97.9 (11 Mar 2018 00:30)  HR: 84 (11 Mar 2018 10:15) (72 - 93)  BP: 110/67 (11 Mar 2018 04:31) (98/64 - 127/56)  BP(mean): --  RR: 18 (11 Mar 2018 04:31) (17 - 19)  SpO2: 95% (11 Mar 2018 10:15) (89% - 97%)    I&O's Summary    10 Mar 2018 06:01  -  11 Mar 2018 07:00  --------------------------------------------------------  IN: 400 mL / OUT: 2900 mL / NET: -2500 mL          Physical Exam:   GENERAL: NAD, well-groomed, well-developed  HEENT: BELL/   Atraumatic, Normocephalic  ENMT: No tonsillar erythema, exudates, or enlargement; Moist mucous membranes, Good dentition, No lesions  NECK: Supple, No JVD, Normal thyroid  CHEST/LUNG: crackles left base:   CVS: Regular rate and rhythm; No murmurs, rubs, or gallops  GI: : Soft, Nontender, Nondistended; Bowel sounds present  NERVOUS SYSTEM:  Alert & Oriented X3  EXTREMITIES:  2+ Peripheral Pulses, No clubbing, cyanosis, or edema  LYMPH: No lymphadenopathy noted  SKIN: No rashes or lesions  ENDOCRINOLOGY: No Thyromegaly  PSYCH: Appropriate    Labs:                              11.4   11.64 )-----------( 130      ( 11 Mar 2018 06:15 )             35.5                         11.2   10.96 )-----------( 126      ( 10 Mar 2018 06:15 )             35.6                         10.7   10.42 )-----------( 113      ( 09 Mar 2018 06:35 )             33.9                         10.5   9.98  )-----------( 105      ( 08 Mar 2018 06:23 )             32.1     03-11    135  |  94<L>  |  48<H>  ----------------------------<  158<H>  3.6   |  24  |  4.32<H>  03-10    134<L>  |  90<L>  |  73<H>  ----------------------------<  176<H>  4.1   |  26  |  6.03<H>  03-09    133<L>  |  93<L>  |  56<H>  ----------------------------<  234<H>  4.0   |  24  |  4.62<H>  03-08    134<L>  |  92<L>  |  73<H>  ----------------------------<  169<H>  4.6   |  22  |  5.91<H>    Ca    9.0      11 Mar 2018 06:15  Ca    9.1      10 Mar 2018 06:15  Mg     1.9     03-11  Mg     2.0     03-10      CAPILLARY BLOOD GLUCOSE      POCT Blood Glucose.: 200 mg/dL (11 Mar 2018 09:14)  POCT Blood Glucose.: 161 mg/dL (10 Mar 2018 21:38)  POCT Blood Glucose.: 105 mg/dL (10 Mar 2018 18:19)  POCT Blood Glucose.: 281 mg/dL (10 Mar 2018 12:38)        PT/INR - ( 11 Mar 2018 06:15 )   PT: 18.0 SEC;   INR: 1.55              Cultures:           < from: Xray Chest 1 View- PORTABLE-Urgent (02.26.18 @ 11:27) >  HNIQUE: AP chest radiograph    COMPARISON: Chest radiograph performed 2/17/2018. CT abdomen and pelvis   performed 10/30/2017.    FINDINGS:    A right-sided dialysis catheter terminates over SVC. There is a   left-sided cardiac device. There is redemonstration of diffuse   interstitial and reticular opacities bilaterally, mildly improved since   2/17/2018. There are small bilateral pleural effusions.    IMPRESSION:    Persistent diffuse interstitial and reticular lung opacities bilaterally,   mildly improved since 2/17/2018, which may represent pulmonary edema   superimposed on patient's known interstitial lung disease.    Small bilateral pleural effusions.              ANNIA MURPHY M.D., RADIOLOGY RESIDENT  This document has been electronically signed.  NITIN MAHARAJ M.D. ATTENDING RADIOLOGIST  This document has been electronically signed. Feb 26 2018  2:54PM    < end of copied text >                    Studies  Chest X-RAY  CT SCAN Chest   Venous Dopplers: LE:   CT Abdomen  Others

## 2018-03-11 NOTE — PROGRESS NOTE ADULT - PROBLEM SELECTOR PLAN 7
Inotropy per cardiology team.  on dobutamine and midodrine  3/1: on dobutamine  3/3: cont current care by primary team  3/4: on dobutaime:  3/5: On Midodrine and Dobutamine:: Inotropy per cardiology team.  on dobutamine and midodrine  3/1: on dobutamine  3/3: cont current care by primary team  3/4: on dobutaime:  3/5: On Midodrine and Dobutamine::  3/11: on dobutamine still

## 2018-03-11 NOTE — PROGRESS NOTE ADULT - PROBLEM SELECTOR PLAN 5
HR controlled  ac as per cards/medicine  management as per cards  3/1: INR is therapeutic:  3/3: INR is therapeutic  3/5: INR therapeutic::  3/9: INR is therapeutic:  3/11: INR is low: for MARGIE tomorrow HR controlled  ac as per cards/medicine  management as per cards  3/1: INR is therapeutic:  3/3: INR is therapeutic  3/5: INR therapeutic::  3/9: INR is therapeutic:  3/11: INR is low:

## 2018-03-11 NOTE — PROGRESS NOTE ADULT - PROBLEM SELECTOR PLAN 3
titrate oxygen to keep o2 sat>=90%  c/w duonebs and pulmicort  2/26 began prednisone 20mg po daily  2/28: cont steroids to see how he responds to it in next few days  3/1: cont steroids as well as BD  3/2: cont steroids  3/3:major issue is pulmonary fibrosis: cont current care:  3/14: cont current care:  3/5: can change pulmicort to symbicort:  3/6: cont low dose steroids  3/7: on steroids  3/8: steoids taper  3/9: Cont 10 mg of steroids at this time  3/10: cont 10 mg , will change to 5 mg soon  3/11: no wheezing: today : michael change to po steroids twice day : till her MARGIE is done tomorrow:

## 2018-03-11 NOTE — PROGRESS NOTE ADULT - PROBLEM SELECTOR PLAN 1
has underlying pulmonary fibrosis and respiratory failure precipitated by influenza:   Titrate fio2 to keep o2 sat >=90% remains on 50% VM  NIV QHS/PRV  2/28: cont NIV at night and daytime too  3/1: says his breathing is better: would cont to use steroids as well as bipap at night time: Cont oxygen  to keep sao2 above 88%:  3/2: doing reasonable: cont current care:  3/3: decrease  steroids to 15 mg a day  3/4: given risk of increasing retention of salt and water , and he has been on dobutamine: would cut it down to 10 mg tomorrow and slowly taper it to off  3/5: It is very hard to determine whether steroids are beneficially improving his SOB: I doubt that steroids are of much help here: They may also potentiate salt and water retention! Will taper them off!  3/6: today sister says she would like to continue steroids for sometime : would continue for now and taper slowly: May be send home on 10 mg of steroids:  3/7: doing ok : cont current care: Has CMP and is on Dobutamine Drip  3/8: on dobutamine  3/9: cont dobutamine and night time BiPAP  3/10: cont current treatment : breathing wise he remains tenuous:  3/11: on dobutamine:

## 2018-03-11 NOTE — PROGRESS NOTE ADULT - PROBLEM SELECTOR PLAN 2
: supportive treatment:  titrate oxygen to keep o2 sat>=90%  2/26 began trial prednisone for sob-prednisone 20 mg po daily  2/28: started on a trial of prednisone two days ago, will monitor, however per his family, he never had a good response to previous trial of steroid.  3/1: on retrial of steroids: Pt thinks his breathing is better then before: Would like to cont steroids at this t alirio  3/2: cont steroids: pt seems to have stable SOB , does not seem to be improving further to me:  3/3: decrease steroids to 15 mg ad ay  3/4: on 15 mg today , decrease to 10 mg a day  3/5: taper off prednisone: already ordered  3*6: cont steroids  3/7: doing pretty poor: cont steroids trial for some time  3/8: on steroids at tis time: low dose steroids  3/9: It is difficult to discern if steroids are helpful in him: However the pt as well as the sister insists that we cont to try steroids: They think he feels a little better with it: Side effects have been explained to them:  3/10: cont 10 mg today too ? decrease to 5 mg soon  3/11: on steroids:

## 2018-03-11 NOTE — PROGRESS NOTE ADULT - SUBJECTIVE AND OBJECTIVE BOX
Patient is a 62y old  Male who presents with a chief complaint of SOB x few hours (20 Feb 2018 18:05)      SUBJECTIVE / OVERNIGHT EVENTS: patient seen and examined by bedside at 11:25 Am, no acute events over night        MEDICATIONS  (STANDING):  ALBUTerol/ipratropium for Nebulization 3 milliLiter(s) Nebulizer every 6 hours  amiodarone    Tablet 100 milliGRAM(s) Oral daily  aspirin enteric coated 81 milliGRAM(s) Oral daily  atorvastatin 80 milliGRAM(s) Oral at bedtime  buDESOnide   90 MICROgram(s) Inhaler 2 Puff(s) Inhalation two times a day  dextrose 5%. 1000 milliLiter(s) (50 mL/Hr) IV Continuous <Continuous>  dextrose 5%. 1000 milliLiter(s) (50 mL/Hr) IV Continuous <Continuous>  dextrose 50% Injectable 12.5 Gram(s) IV Push once  dextrose 50% Injectable 25 Gram(s) IV Push once  dextrose 50% Injectable 25 Gram(s) IV Push once  DOBUTamine Infusion 7.5 MICROgram(s)/kG/Min (16.942 mL/Hr) IV Continuous <Continuous>  docusate sodium 100 milliGRAM(s) Oral two times a day  finasteride 5 milliGRAM(s) Oral daily  influenza   Vaccine 0.5 milliLiter(s) IntraMuscular once  insulin glargine Injectable (LANTUS) 20 Unit(s) SubCutaneous at bedtime  insulin lispro (HumaLOG) corrective regimen sliding scale   SubCutaneous three times a day before meals  insulin lispro (HumaLOG) corrective regimen sliding scale   SubCutaneous at bedtime  insulin lispro Injectable (HumaLOG) 8 Unit(s) SubCutaneous three times a day before meals  levothyroxine 75 MICROGram(s) Oral daily  midodrine 30 milliGRAM(s) Oral three times a day  MIRACLE MOUTHWASH 30 milliLiter(s) Swish and Spit three times a day  Nephro-lynda 1 Tablet(s) Oral daily  polyethylene glycol 3350 17 Gram(s) Oral daily  predniSONE   Tablet   Oral   predniSONE   Tablet 5 milliGRAM(s) Oral daily  Saliva Substitute (CAPHOSOL) 30 milliLiter(s) Swish and Spit every 6 hours  senna 2 Tablet(s) Oral at bedtime  warfarin 6 milliGRAM(s) Oral once    MEDICATIONS  (PRN):  benzocaine 15 mG/menthol 3.6 mG Lozenge 1 Lozenge Oral every 6 hours PRN Sore Throat  dextrose Gel 1 Dose(s) Oral once PRN Blood Glucose LESS THAN 70 milliGRAM(s)/deciLiter  dextrose Gel 1 Dose(s) Oral once PRN Blood Glucose LESS THAN 70 milliGRAM(s)/deciliter  glucagon  Injectable 1 milliGRAM(s) IntraMuscular once PRN Glucose <70 milliGRAM(s)/deciLiter  guaiFENesin    Syrup 100 milliGRAM(s) Oral every 6 hours PRN Cough  sodium chloride 0.65% Nasal 1 Spray(s) Both Nostrils every 2 hours PRN Nasal Congestion      Vital Signs Last 24 Hrs  T(C): 36.4 (11 Mar 2018 13:00), Max: 36.6 (11 Mar 2018 00:30)  T(F): 97.5 (11 Mar 2018 13:00), Max: 97.9 (11 Mar 2018 00:30)  HR: 88 (11 Mar 2018 15:32) (81 - 93)  BP: 114/71 (11 Mar 2018 13:00) (110/67 - 127/56)  BP(mean): --  RR: 18 (11 Mar 2018 13:00) (17 - 18)  SpO2: 90% (11 Mar 2018 15:32) (89% - 95%)  CAPILLARY BLOOD GLUCOSE      POCT Blood Glucose.: 112 mg/dL (11 Mar 2018 12:57)  POCT Blood Glucose.: 200 mg/dL (11 Mar 2018 09:14)  POCT Blood Glucose.: 161 mg/dL (10 Mar 2018 21:38)  POCT Blood Glucose.: 105 mg/dL (10 Mar 2018 18:19)    I&O's Summary    10 Mar 2018 06:01  -  11 Mar 2018 07:00  --------------------------------------------------------  IN: 400 mL / OUT: 2900 mL / NET: -2500 mL      PHYSICAL EXAM  GENERAL: Moderate respiratory distress, well-developed  HEAD:  Atraumatic, Normocephalic  EYES: EOMI, PERRLA, conjunctiva and sclera clear  NECK: +JVD  CHEST/LUNG: +bibasilar crackles   HEART: Regular rate and rhythm; No murmurs, rubs, or gallops  ABDOMEN: Soft, Nontender, Nondistended; Bowel sounds present  EXTREMITIES: 3+ pitting edema in b/l LE   PSYCH: AAOx3  SKIN: No rashes or lesions      LABS:                        11.4   11.64 )-----------( 130      ( 11 Mar 2018 06:15 )             35.5     03-11    135  |  94<L>  |  48<H>  ----------------------------<  158<H>  3.6   |  24  |  4.32<H>    Ca    9.0      11 Mar 2018 06:15  Mg     1.9     03-11      PT/INR - ( 11 Mar 2018 06:15 )   PT: 18.0 SEC;   INR: 1.55                    RADIOLOGY & ADDITIONAL TESTS:    Imaging Personally Reviewed:    Consultant(s) Notes Reviewed:  pulmonary, Renal    Care Discussed with Consultants/Other Providers: Pulmonary

## 2018-03-11 NOTE — PROVIDER CONTACT NOTE (OTHER) - ACTION/TREATMENT ORDERED:
O2 increased to 6L via NC. Will continue to monitor patient and reinforce education about the importance of maintaining the BiPAP overnight.

## 2018-03-11 NOTE — PROGRESS NOTE ADULT - SUBJECTIVE AND OBJECTIVE BOX
Patient seen and examined  no complaints    No Known Allergies    Hospital Medications:   MEDICATIONS  (STANDING):  ALBUTerol/ipratropium for Nebulization 3 milliLiter(s) Nebulizer every 6 hours  amiodarone    Tablet 100 milliGRAM(s) Oral daily  aspirin enteric coated 81 milliGRAM(s) Oral daily  atorvastatin 80 milliGRAM(s) Oral at bedtime  buDESOnide   90 MICROgram(s) Inhaler 2 Puff(s) Inhalation two times a day  dextrose 5%. 1000 milliLiter(s) (50 mL/Hr) IV Continuous <Continuous>  dextrose 5%. 1000 milliLiter(s) (50 mL/Hr) IV Continuous <Continuous>  dextrose 50% Injectable 12.5 Gram(s) IV Push once  dextrose 50% Injectable 25 Gram(s) IV Push once  dextrose 50% Injectable 25 Gram(s) IV Push once  DOBUTamine Infusion 7.5 MICROgram(s)/kG/Min (16.942 mL/Hr) IV Continuous <Continuous>  docusate sodium 100 milliGRAM(s) Oral two times a day  finasteride 5 milliGRAM(s) Oral daily  influenza   Vaccine 0.5 milliLiter(s) IntraMuscular once  insulin glargine Injectable (LANTUS) 20 Unit(s) SubCutaneous at bedtime  insulin lispro (HumaLOG) corrective regimen sliding scale   SubCutaneous three times a day before meals  insulin lispro (HumaLOG) corrective regimen sliding scale   SubCutaneous at bedtime  insulin lispro Injectable (HumaLOG) 8 Unit(s) SubCutaneous three times a day before meals  levothyroxine 75 MICROGram(s) Oral daily  midodrine 30 milliGRAM(s) Oral three times a day  MIRACLE MOUTHWASH 30 milliLiter(s) Swish and Spit three times a day  Nephro-lynda 1 Tablet(s) Oral daily  polyethylene glycol 3350 17 Gram(s) Oral daily  predniSONE   Tablet   Oral   predniSONE   Tablet 5 milliGRAM(s) Oral daily  Saliva Substitute (CAPHOSOL) 30 milliLiter(s) Swish and Spit every 6 hours  senna 2 Tablet(s) Oral at bedtime  warfarin 6 milliGRAM(s) Oral once    VITALS:  T(F): 97.4 (03-11-18 @ 04:31), Max: 97.9 (03-11-18 @ 00:30)  HR: 84 (03-11-18 @ 10:15)  BP: 110/67 (03-11-18 @ 04:31)  RR: 18 (03-11-18 @ 04:31)  SpO2: 95% (03-11-18 @ 10:15)  Wt(kg): --    03-10 @ 06:01  -  03-11 @ 07:00  --------------------------------------------------------  IN: 400 mL / OUT: 2900 mL / NET: -2500 mL      PHYSICAL EXAM:  Constitutional: NAD, sitting comfortably in bed with nasal O2  HEENT: anicteric sclera, oropharynx clear, MMM  Neck: No JVD  Respiratory: occ crepts rt base. left lung clear  Cardiovascular: S1, S2,irreg  Gastrointestinal: BS+, soft, NT/ND  Extremities: No cyanosis or clubbing. 2+ leg edema b/l  Neurological: A/O x 3, no focal deficits  Psychiatric: Normal mood, normal affect  : No CVA tenderness. No rosa.   Skin: No rashes  Vascular Access: IJ PC+    LABS:  03-11    135  |  94<L>  |  48<H>  ----------------------------<  158<H>  3.6   |  24  |  4.32<H>    Ca    9.0      11 Mar 2018 06:15  Mg     1.9     03-11      Creatinine Trend: 4.32 <--, 6.03 <--, 4.62 <--, 5.91 <--, 5.04 <--, 6.66 <--, 5.67 <--                        11.4   11.64 )-----------( 130      ( 11 Mar 2018 06:15 )             35.5     Urine Studies:      RADIOLOGY & ADDITIONAL STUDIES:

## 2018-03-11 NOTE — PROVIDER CONTACT NOTE (OTHER) - ASSESSMENT
Patient with mild shortness of breathe, oxygen saturation 87-90% on 5L via NC. Patient with mild shortness of breathe, oxygen saturation 87-93% on 5L via NC.

## 2018-03-12 LAB
BUN SERPL-MCNC: 73 MG/DL — HIGH (ref 7–23)
CALCIUM SERPL-MCNC: 9 MG/DL — SIGNIFICANT CHANGE UP (ref 8.4–10.5)
CHLORIDE SERPL-SCNC: 91 MMOL/L — LOW (ref 98–107)
CO2 SERPL-SCNC: 24 MMOL/L — SIGNIFICANT CHANGE UP (ref 22–31)
CREAT SERPL-MCNC: 5.81 MG/DL — HIGH (ref 0.5–1.3)
GLUCOSE SERPL-MCNC: 85 MG/DL — SIGNIFICANT CHANGE UP (ref 70–99)
HCT VFR BLD CALC: 38.4 % — LOW (ref 39–50)
HGB BLD-MCNC: 12.2 G/DL — LOW (ref 13–17)
INR BLD: 1.7 — HIGH (ref 0.88–1.17)
MAGNESIUM SERPL-MCNC: 2 MG/DL — SIGNIFICANT CHANGE UP (ref 1.6–2.6)
MCHC RBC-ENTMCNC: 30.9 PG — SIGNIFICANT CHANGE UP (ref 27–34)
MCHC RBC-ENTMCNC: 31.8 % — LOW (ref 32–36)
MCV RBC AUTO: 97.2 FL — SIGNIFICANT CHANGE UP (ref 80–100)
NRBC # FLD: 0 — SIGNIFICANT CHANGE UP
PLATELET # BLD AUTO: 129 K/UL — LOW (ref 150–400)
PMV BLD: 13.2 FL — HIGH (ref 7–13)
POTASSIUM SERPL-MCNC: 4.4 MMOL/L — SIGNIFICANT CHANGE UP (ref 3.5–5.3)
POTASSIUM SERPL-SCNC: 4.4 MMOL/L — SIGNIFICANT CHANGE UP (ref 3.5–5.3)
PROTHROM AB SERPL-ACNC: 19.1 SEC — HIGH (ref 9.8–13.1)
RBC # BLD: 3.95 M/UL — LOW (ref 4.2–5.8)
RBC # FLD: 19.1 % — HIGH (ref 10.3–14.5)
SODIUM SERPL-SCNC: 135 MMOL/L — SIGNIFICANT CHANGE UP (ref 135–145)
WBC # BLD: 17.28 K/UL — HIGH (ref 3.8–10.5)
WBC # FLD AUTO: 17.28 K/UL — HIGH (ref 3.8–10.5)

## 2018-03-12 PROCEDURE — 99233 SBSQ HOSP IP/OBS HIGH 50: CPT

## 2018-03-12 RX ORDER — WARFARIN SODIUM 2.5 MG/1
6 TABLET ORAL ONCE
Qty: 0 | Refills: 0 | Status: COMPLETED | OUTPATIENT
Start: 2018-03-12 | End: 2018-03-12

## 2018-03-12 RX ORDER — DEXTROSE 50 % IN WATER 50 %
12.5 SYRINGE (ML) INTRAVENOUS ONCE
Qty: 0 | Refills: 0 | Status: COMPLETED | OUTPATIENT
Start: 2018-03-12 | End: 2018-03-12

## 2018-03-12 RX ADMIN — Medication 30 MILLILITER(S): at 13:52

## 2018-03-12 RX ADMIN — Medication 16.94 MICROGRAM(S)/KG/MIN: at 03:41

## 2018-03-12 RX ADMIN — Medication 3 MILLILITER(S): at 09:05

## 2018-03-12 RX ADMIN — MIDODRINE HYDROCHLORIDE 30 MILLIGRAM(S): 2.5 TABLET ORAL at 06:16

## 2018-03-12 RX ADMIN — Medication 2 PUFF(S): at 22:05

## 2018-03-12 RX ADMIN — Medication 75 MICROGRAM(S): at 06:14

## 2018-03-12 RX ADMIN — INSULIN GLARGINE 20 UNIT(S): 100 INJECTION, SOLUTION SUBCUTANEOUS at 22:05

## 2018-03-12 RX ADMIN — Medication 16.94 MICROGRAM(S)/KG/MIN: at 06:16

## 2018-03-12 RX ADMIN — Medication 16.94 MICROGRAM(S)/KG/MIN: at 08:29

## 2018-03-12 RX ADMIN — Medication 8 UNIT(S): at 18:24

## 2018-03-12 RX ADMIN — Medication 30 MILLILITER(S): at 18:25

## 2018-03-12 RX ADMIN — Medication 3 MILLILITER(S): at 23:22

## 2018-03-12 RX ADMIN — Medication 30 MILLILITER(S): at 06:15

## 2018-03-12 RX ADMIN — ATORVASTATIN CALCIUM 80 MILLIGRAM(S): 80 TABLET, FILM COATED ORAL at 22:05

## 2018-03-12 RX ADMIN — Medication 2: at 18:24

## 2018-03-12 RX ADMIN — MIDODRINE HYDROCHLORIDE 30 MILLIGRAM(S): 2.5 TABLET ORAL at 22:05

## 2018-03-12 RX ADMIN — MIDODRINE HYDROCHLORIDE 30 MILLIGRAM(S): 2.5 TABLET ORAL at 14:51

## 2018-03-12 RX ADMIN — Medication 3 MILLILITER(S): at 05:11

## 2018-03-12 RX ADMIN — Medication 1 TABLET(S): at 13:52

## 2018-03-12 RX ADMIN — Medication 30 MILLILITER(S): at 03:41

## 2018-03-12 RX ADMIN — WARFARIN SODIUM 6 MILLIGRAM(S): 2.5 TABLET ORAL at 18:24

## 2018-03-12 RX ADMIN — Medication 8 UNIT(S): at 10:04

## 2018-03-12 RX ADMIN — Medication 30 MILLILITER(S): at 23:55

## 2018-03-12 RX ADMIN — FINASTERIDE 5 MILLIGRAM(S): 5 TABLET, FILM COATED ORAL at 13:52

## 2018-03-12 RX ADMIN — Medication 5 MILLIGRAM(S): at 06:15

## 2018-03-12 RX ADMIN — Medication 81 MILLIGRAM(S): at 13:52

## 2018-03-12 RX ADMIN — AMIODARONE HYDROCHLORIDE 100 MILLIGRAM(S): 400 TABLET ORAL at 06:14

## 2018-03-12 RX ADMIN — Medication 2 PUFF(S): at 10:17

## 2018-03-12 RX ADMIN — DIPHENHYDRAMINE HYDROCHLORIDE AND LIDOCAINE HYDROCHLORIDE AND ALUMINUM HYDROXIDE AND MAGNESIUM HYDRO 30 MILLILITER(S): KIT at 23:55

## 2018-03-12 RX ADMIN — Medication 12.5 GRAM(S): at 03:41

## 2018-03-12 RX ADMIN — Medication 2 PUFF(S): at 03:40

## 2018-03-12 RX ADMIN — DIPHENHYDRAMINE HYDROCHLORIDE AND LIDOCAINE HYDROCHLORIDE AND ALUMINUM HYDROXIDE AND MAGNESIUM HYDRO 30 MILLILITER(S): KIT at 18:47

## 2018-03-12 NOTE — PROGRESS NOTE ADULT - ASSESSMENT
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

## 2018-03-12 NOTE — PROGRESS NOTE ADULT - PROBLEM SELECTOR PLAN 1
s/p HD on 3/10 - s/p uf session on 3/9  arranged for PUF later today, 2hrs, uf 1.5-2kg as tolearted and hd tomorrow w/2k bath, uf 2-2.5kg as tolearted  c/w midodrine  c/w renal diet, fluid restriction 1.2L/day

## 2018-03-12 NOTE — PROGRESS NOTE ADULT - SUBJECTIVE AND OBJECTIVE BOX
Patient is a 62y old  Male who presents with a chief complaint of SOB x few hours (20 Feb 2018 18:05)      Any change in ROS: doingpoorly: still get SOB off and on     MEDICATIONS  (STANDING):  ALBUTerol/ipratropium for Nebulization 3 milliLiter(s) Nebulizer every 6 hours  amiodarone    Tablet 100 milliGRAM(s) Oral daily  aspirin enteric coated 81 milliGRAM(s) Oral daily  atorvastatin 80 milliGRAM(s) Oral at bedtime  buDESOnide   90 MICROgram(s) Inhaler 2 Puff(s) Inhalation two times a day  dextrose 5%. 1000 milliLiter(s) (50 mL/Hr) IV Continuous <Continuous>  dextrose 5%. 1000 milliLiter(s) (50 mL/Hr) IV Continuous <Continuous>  dextrose 50% Injectable 12.5 Gram(s) IV Push once  dextrose 50% Injectable 25 Gram(s) IV Push once  dextrose 50% Injectable 25 Gram(s) IV Push once  DOBUTamine Infusion 7.5 MICROgram(s)/kG/Min (16.942 mL/Hr) IV Continuous <Continuous>  docusate sodium 100 milliGRAM(s) Oral two times a day  finasteride 5 milliGRAM(s) Oral daily  influenza   Vaccine 0.5 milliLiter(s) IntraMuscular once  insulin glargine Injectable (LANTUS) 20 Unit(s) SubCutaneous at bedtime  insulin lispro (HumaLOG) corrective regimen sliding scale   SubCutaneous three times a day before meals  insulin lispro (HumaLOG) corrective regimen sliding scale   SubCutaneous at bedtime  insulin lispro Injectable (HumaLOG) 8 Unit(s) SubCutaneous three times a day before meals  levothyroxine 75 MICROGram(s) Oral daily  midodrine 30 milliGRAM(s) Oral three times a day  MIRACLE MOUTHWASH 30 milliLiter(s) Swish and Spit three times a day  Nephro-lynda 1 Tablet(s) Oral daily  polyethylene glycol 3350 17 Gram(s) Oral daily  predniSONE   Tablet   Oral   predniSONE   Tablet 5 milliGRAM(s) Oral daily  Saliva Substitute (CAPHOSOL) 30 milliLiter(s) Swish and Spit every 6 hours  senna 2 Tablet(s) Oral at bedtime  warfarin 6 milliGRAM(s) Oral once    MEDICATIONS  (PRN):  benzocaine 15 mG/menthol 3.6 mG Lozenge 1 Lozenge Oral every 6 hours PRN Sore Throat  dextrose Gel 1 Dose(s) Oral once PRN Blood Glucose LESS THAN 70 milliGRAM(s)/deciLiter  dextrose Gel 1 Dose(s) Oral once PRN Blood Glucose LESS THAN 70 milliGRAM(s)/deciliter  glucagon  Injectable 1 milliGRAM(s) IntraMuscular once PRN Glucose <70 milliGRAM(s)/deciLiter  guaiFENesin    Syrup 100 milliGRAM(s) Oral every 6 hours PRN Cough  sodium chloride 0.65% Nasal 1 Spray(s) Both Nostrils every 2 hours PRN Nasal Congestion    Vital Signs Last 24 Hrs  T(C): 36.3 (12 Mar 2018 08:30), Max: 36.6 (12 Mar 2018 06:12)  T(F): 97.3 (12 Mar 2018 08:30), Max: 97.8 (12 Mar 2018 06:12)  HR: 89 (12 Mar 2018 11:09) (68 - 100)  BP: 106/67 (12 Mar 2018 08:30) (101/66 - 124/69)  BP(mean): --  RR: 17 (12 Mar 2018 08:30) (17 - 18)  SpO2: 93% (12 Mar 2018 11:09) (90% - 96%)    I&O's Summary        Physical Exam:   GENERAL: NAD, well-groomed, well-developed  HEENT: BELL/   Atraumatic, Normocephalic  ENMT: No tonsillar erythema, exudates, or enlargement; Moist mucous membranes, Good dentition, No lesions  NECK: Supple, No JVD, Normal thyroid  CHEST/LUNG: crackles left base:   CVS: Regular rate and rhythm; No murmurs, rubs, or gallops  GI: : Soft, Nontender, Nondistended; Bowel sounds present  NERVOUS SYSTEM:  Alert & Oriented X3  EXTREMITIES:  2+ edema  LYMPH: No lymphadenopathy noted  SKIN: No rashes or lesions  ENDOCRINOLOGY: No Thyromegaly  PSYCH: Appropriate    Labs:                              12.2   17.28 )-----------( 129      ( 12 Mar 2018 06:21 )             38.4                         11.4   11.64 )-----------( 130      ( 11 Mar 2018 06:15 )             35.5                         11.2   10.96 )-----------( 126      ( 10 Mar 2018 06:15 )             35.6                         10.7   10.42 )-----------( 113      ( 09 Mar 2018 06:35 )             33.9     03-12    135  |  91<L>  |  73<H>  ----------------------------<  85  4.4   |  24  |  5.81<H>  03-11    135  |  94<L>  |  48<H>  ----------------------------<  158<H>  3.6   |  24  |  4.32<H>  03-10    134<L>  |  90<L>  |  73<H>  ----------------------------<  176<H>  4.1   |  26  |  6.03<H>  03-09    133<L>  |  93<L>  |  56<H>  ----------------------------<  234<H>  4.0   |  24  |  4.62<H>    Ca    9.0      12 Mar 2018 06:21  Ca    9.0      11 Mar 2018 06:15  Mg     2.0     03-12  Mg     1.9     03-11      CAPILLARY BLOOD GLUCOSE      POCT Blood Glucose.: 167 mg/dL (12 Mar 2018 10:13)  POCT Blood Glucose.: 384 mg/dL (12 Mar 2018 10:10)  POCT Blood Glucose.: 148 mg/dL (12 Mar 2018 09:11)  POCT Blood Glucose.: 160 mg/dL (12 Mar 2018 03:48)  POCT Blood Glucose.: 61 mg/dL (12 Mar 2018 03:07)  POCT Blood Glucose.: 57 mg/dL (12 Mar 2018 02:47)  POCT Blood Glucose.: 62 mg/dL (12 Mar 2018 02:44)  POCT Blood Glucose.: 85 mg/dL (11 Mar 2018 21:53)  POCT Blood Glucose.: 52 mg/dL (11 Mar 2018 21:27)  POCT Blood Glucose.: 93 mg/dL (11 Mar 2018 17:44)  POCT Blood Glucose.: 112 mg/dL (11 Mar 2018 12:57)        PT/INR - ( 12 Mar 2018 06:21 )   PT: 19.1 SEC;   INR: 1.70              Cultures:         < from: Xray Chest 1 View- PORTABLE-Urgent (02.26.18 @ 11:27) >  GS:    A right-sided dialysis catheter terminates over SVC. There is a   left-sided cardiac device. There is redemonstration of diffuse   interstitial and reticular opacities bilaterally, mildly improved since   2/17/2018. There are small bilateral pleural effusions.    IMPRESSION:    Persistent diffuse interstitial and reticular lung opacities bilaterally,   mildly improved since 2/17/2018, which may represent pulmonary edema   superimposed on patient's known interstitial lung disease.    Small bilateral pleural effusions.              ANNIA MURPHY M.D., RADIOLOGY RESIDENT  This document has been electronically signed.  NITIN MAHARAJ M.D. ATTENDING RADIOLOGIST  This document has been electronically signed. Feb 26 2018  2:54PM    < end of copied text >                      Studies  Chest X-RAY  CT SCAN Chest   Venous Dopplers: LE:   CT Abdomen  Others

## 2018-03-12 NOTE — PROGRESS NOTE ADULT - PROBLEM SELECTOR PLAN 5
HR controlled  ac as per cards/medicine  management as per cards  3/1: INR is therapeutic:  3/3: INR is therapeutic  3/5: INR therapeutic::  3/9: INR is therapeutic:  3/11: INR is low:  3/12: 1.70

## 2018-03-12 NOTE — PROGRESS NOTE ADULT - PROBLEM SELECTOR PLAN 2
: supportive treatment:  titrate oxygen to keep o2 sat>=90%  2/26 began trial prednisone for sob-prednisone 20 mg po daily  2/28: started on a trial of prednisone two days ago, will monitor, however per his family, he never had a good response to previous trial of steroid.  3/1: on retrial of steroids: Pt thinks his breathing is better then before: Would like to cont steroids at this t alirio  3/2: cont steroids: pt seems to have stable SOB , does not seem to be improving further to me:  3/3: decrease steroids to 15 mg ad ay  3/4: on 15 mg today , decrease to 10 mg a day  3/5: taper off prednisone: already ordered  3*6: cont steroids  3/7: doing pretty poor: cont steroids trial for some time  3/8: on steroids at tis time: low dose steroids  3/9: It is difficult to discern if steroids are helpful in him: However the pt as well as the sister insists that we cont to try steroids: They think he feels a little better with it: Side effects have been explained to them:  3/10: cont 10 mg today too ? decrease to 5 mg soon  3/11: on steroids:  3/12: pt is on 5 mg of steroids: will taper  to off on next few days:

## 2018-03-12 NOTE — PROGRESS NOTE ADULT - PROBLEM SELECTOR PLAN 2
- Pt remains severely volume overloaded and there is no end point to adequate removal of his volume.  We have extensively discussed this with pt and he wants to stay in the hospital as long as it takes.   - On dobutamine infusion - HD/UF per renal (additional session for today)  - As per cardio, no indication for TTE with bubble for R to L shunt eval because patient is not candidate for possible PFO repair at this time.  - Pt unable to take BB or ACE/ARB as he is chronically hypotensive on Midodrine  - Will consider obtaining 2nd opinion from inpatient HF team

## 2018-03-12 NOTE — PROGRESS NOTE ADULT - SUBJECTIVE AND OBJECTIVE BOX
Patient seen and examined bedside    no new complaints today. SOB stable    No Known Allergies    Hospital Medications:   MEDICATIONS  (STANDING):  ALBUTerol/ipratropium for Nebulization 3 milliLiter(s) Nebulizer every 6 hours  amiodarone    Tablet 100 milliGRAM(s) Oral daily  aspirin enteric coated 81 milliGRAM(s) Oral daily  atorvastatin 80 milliGRAM(s) Oral at bedtime  buDESOnide   90 MICROgram(s) Inhaler 2 Puff(s) Inhalation two times a day  dextrose 5%. 1000 milliLiter(s) (50 mL/Hr) IV Continuous <Continuous>  dextrose 5%. 1000 milliLiter(s) (50 mL/Hr) IV Continuous <Continuous>  dextrose 50% Injectable 12.5 Gram(s) IV Push once  dextrose 50% Injectable 25 Gram(s) IV Push once  dextrose 50% Injectable 25 Gram(s) IV Push once  DOBUTamine Infusion 7.5 MICROgram(s)/kG/Min (16.942 mL/Hr) IV Continuous <Continuous>  docusate sodium 100 milliGRAM(s) Oral two times a day  finasteride 5 milliGRAM(s) Oral daily  influenza   Vaccine 0.5 milliLiter(s) IntraMuscular once  insulin glargine Injectable (LANTUS) 20 Unit(s) SubCutaneous at bedtime  insulin lispro (HumaLOG) corrective regimen sliding scale   SubCutaneous three times a day before meals  insulin lispro (HumaLOG) corrective regimen sliding scale   SubCutaneous at bedtime  insulin lispro Injectable (HumaLOG) 8 Unit(s) SubCutaneous three times a day before meals  levothyroxine 75 MICROGram(s) Oral daily  midodrine 30 milliGRAM(s) Oral three times a day  MIRACLE MOUTHWASH 30 milliLiter(s) Swish and Spit three times a day  Nephro-lynda 1 Tablet(s) Oral daily  polyethylene glycol 3350 17 Gram(s) Oral daily  predniSONE   Tablet   Oral   predniSONE   Tablet 5 milliGRAM(s) Oral daily  Saliva Substitute (CAPHOSOL) 30 milliLiter(s) Swish and Spit every 6 hours  senna 2 Tablet(s) Oral at bedtime  warfarin 6 milliGRAM(s) Oral once    VITALS:  Vital Signs Last 24 Hrs  T(C): 36.3 (12 Mar 2018 08:30), Max: 36.6 (12 Mar 2018 06:12)  T(F): 97.3 (12 Mar 2018 08:30), Max: 97.8 (12 Mar 2018 06:12)  HR: 89 (12 Mar 2018 11:09) (68 - 100)  BP: 106/67 (12 Mar 2018 08:30) (101/66 - 124/69)  BP(mean): --  RR: 17 (12 Mar 2018 08:30) (17 - 18)  SpO2: 93% (12 Mar 2018 11:09) (90% - 96%)      PHYSICAL EXAM:  Constitutional: NAD, sitting comfortably in bed with nasal O2  HEENT: anicteric sclera, oropharynx clear, MMM  Neck: No JVD  Respiratory: occ crepts rt base. left lung clear  Cardiovascular: S1, S2,irreg  Gastrointestinal: BS+, soft, NT/ND  Extremities: No cyanosis or clubbing. 2+ leg edema b/l  Neurological: A/O x 3, no focal deficits  Psychiatric: Normal mood, normal affect  : No CVA tenderness. No rosa.   Skin: No rashes  Vascular Access: IJ PC+    LABS:  03-12    135  |  91<L>  |  73<H>  ----------------------------<  85  4.4   |  24  |  5.81<H>    Ca    9.0      12 Mar 2018 06:21  Mg     2.0     03-12                          12.2   17.28 )-----------( 129      ( 12 Mar 2018 06:21 )             38.4       Urine Studies:      RADIOLOGY & ADDITIONAL STUDIES:

## 2018-03-12 NOTE — PROGRESS NOTE ADULT - PROBLEM SELECTOR PLAN 7
Inotropy per cardiology team.  on dobutamine and midodrine  3/1: on dobutamine  3/3: cont current care by primary team  3/4: on dobutaime:  3/5: On Midodrine and Dobutamine::  3/11: on dobutamine still

## 2018-03-12 NOTE — PROGRESS NOTE ADULT - SUBJECTIVE AND OBJECTIVE BOX
Discussed patient's SOB, he has no desire to utilize opiates as an adjunctive agent    T(C): 36.6 (03-12-18 @ 17:31), Max: 36.6 (03-12-18 @ 06:12)  HR: 88 (03-12-18 @ 17:31) (82 - 100)  BP: 115/61 (03-12-18 @ 17:31) (101/66 - 124/69)  RR: 17 (03-12-18 @ 17:31) (17 - 18)  SpO2: 90% (03-12-18 @ 15:33) (90% - 96%)  Wt(kg): --      12 Mar 2018 07:01  -  12 Mar 2018 21:58  --------------------------------------------------------  IN:    Other: 400 mL  Total IN: 400 mL    OUT:    Other: 2400 mL  Total OUT: 2400 mL    Total NET: -2000 mL          03-12 @ 07:01  -  03-12 @ 21:58  --------------------------------------------------------  IN: 400 mL / OUT: 2400 mL / NET: -2000 mL      CAPILLARY BLOOD GLUCOSE      POCT Blood Glucose.: 226 mg/dL (12 Mar 2018 18:03)  POCT Blood Glucose.: 137 mg/dL (12 Mar 2018 12:53)  POCT Blood Glucose.: 167 mg/dL (12 Mar 2018 10:13)  POCT Blood Glucose.: 384 mg/dL (12 Mar 2018 10:10)  POCT Blood Glucose.: 148 mg/dL (12 Mar 2018 09:11)  POCT Blood Glucose.: 160 mg/dL (12 Mar 2018 03:48)  POCT Blood Glucose.: 61 mg/dL (12 Mar 2018 03:07)  POCT Blood Glucose.: 57 mg/dL (12 Mar 2018 02:47)  POCT Blood Glucose.: 62 mg/dL (12 Mar 2018 02:44)        ALBUTerol/ipratropium for Nebulization 3 milliLiter(s) Nebulizer every 6 hours  amiodarone    Tablet 100 milliGRAM(s) Oral daily  aspirin enteric coated 81 milliGRAM(s) Oral daily  atorvastatin 80 milliGRAM(s) Oral at bedtime  benzocaine 15 mG/menthol 3.6 mG Lozenge 1 Lozenge Oral every 6 hours PRN  buDESOnide   90 MICROgram(s) Inhaler 2 Puff(s) Inhalation two times a day  dextrose 5%. 1000 milliLiter(s) IV Continuous <Continuous>  dextrose 5%. 1000 milliLiter(s) IV Continuous <Continuous>  dextrose 50% Injectable 12.5 Gram(s) IV Push once  dextrose 50% Injectable 25 Gram(s) IV Push once  dextrose 50% Injectable 25 Gram(s) IV Push once  dextrose Gel 1 Dose(s) Oral once PRN  dextrose Gel 1 Dose(s) Oral once PRN  DOBUTamine Infusion 7.5 MICROgram(s)/kG/Min IV Continuous <Continuous>  docusate sodium 100 milliGRAM(s) Oral two times a day  finasteride 5 milliGRAM(s) Oral daily  glucagon  Injectable 1 milliGRAM(s) IntraMuscular once PRN  guaiFENesin    Syrup 100 milliGRAM(s) Oral every 6 hours PRN  influenza   Vaccine 0.5 milliLiter(s) IntraMuscular once  insulin glargine Injectable (LANTUS) 20 Unit(s) SubCutaneous at bedtime  insulin lispro (HumaLOG) corrective regimen sliding scale   SubCutaneous three times a day before meals  insulin lispro (HumaLOG) corrective regimen sliding scale   SubCutaneous at bedtime  insulin lispro Injectable (HumaLOG) 8 Unit(s) SubCutaneous three times a day before meals  levothyroxine 75 MICROGram(s) Oral daily  midodrine 30 milliGRAM(s) Oral three times a day  MIRACLE MOUTHWASH 30 milliLiter(s) Swish and Spit three times a day  Nephro-lynda 1 Tablet(s) Oral daily  polyethylene glycol 3350 17 Gram(s) Oral daily  predniSONE   Tablet   Oral   predniSONE   Tablet 5 milliGRAM(s) Oral daily  Saliva Substitute (CAPHOSOL) 30 milliLiter(s) Swish and Spit every 6 hours  senna 2 Tablet(s) Oral at bedtime  sodium chloride 0.65% Nasal 1 Spray(s) Both Nostrils every 2 hours PRN          03-12    135  |  91<L>  |  73<H>  ----------------------------<  85  4.4   |  24  |  5.81<H>    Ca    9.0      12 Mar 2018 06:21  Mg     2.0     03-12        Procalc  BNP  ABG                          12.2   17.28 )-----------( 129      ( 12 Mar 2018 06:21 )             38.4   PT/INR - ( 12 Mar 2018 06:21 )   PT: 19.1 SEC;   INR: 1.70                  blood and urine cultures          PERTINENT PMH REVIEWED:  [x ] YES [ ] NO           SOCIAL HISTORY:  Significant other/partner:  [x ] YES  [ ] NO            Children:  [ x] YES  [ ] NO                   Christian/Spirituality: Mormonism  Substance hx:  [ ] YES   [ x] NO           Tobacco hx:  [x ] YES  [ ] NO             Alcohol hx: [ ] YES  [x ] NO        Home Opioid hx:  [ ] YES  [ x] NO   Living Situation: x[ ] Home  [ ] Long term care  [ ] Rehab    REFERRALS:   [x ] Chaplaincy  [ ] Hospice  [ ] Child Life  [ ] Social Work  [ ] Case management [ ] Holistic Therapy     FAMILY HISTORY:  No pertinent family history in first degree relatives    [x ] Family history non contributory     BASELINE ADLs (prior to admission):  Independent [ ] moderately [ ] fully   Dependent   [x ] moderately [ ] fully    ADVANCE DIRECTIVES:  [ ] YES [x ] NO   DNR [ ] YES [ x] NO                      MOLST  [ ] YES [ x] NO    Living Will  [ ] YES [x ] NO    Health Care Proxy [ ] YES  [x ] NO      [ x] Surrogate  [ ] HCP  [ ] Guardian:    Wife                                                              Phone#:    Allergies    No Known Allergies    Intolerances        MEDICATIONS  (STANDING):  ALBUTerol/ipratropium for Nebulization 3 milliLiter(s) Nebulizer every 6 hours  amiodarone    Tablet 100 milliGRAM(s) Oral daily  aspirin enteric coated 81 milliGRAM(s) Oral daily  atorvastatin 80 milliGRAM(s) Oral at bedtime  buDESOnide   90 MICROgram(s) Inhaler 2 Puff(s) Inhalation two times a day  chlorhexidine 4% Liquid 1 Application(s) Topical daily  dextrose 5%. 1000 milliLiter(s) (50 mL/Hr) IV Continuous <Continuous>  dextrose 5%. 1000 milliLiter(s) (50 mL/Hr) IV Continuous <Continuous>  dextrose 50% Injectable 12.5 Gram(s) IV Push once  dextrose 50% Injectable 25 Gram(s) IV Push once  dextrose 50% Injectable 25 Gram(s) IV Push once  DOBUTamine Infusion 7.5 MICROgram(s)/kG/Min (16.942 mL/Hr) IV Continuous <Continuous>  docusate sodium 100 milliGRAM(s) Oral two times a day  finasteride 5 milliGRAM(s) Oral daily  influenza   Vaccine 0.5 milliLiter(s) IntraMuscular once  insulin glargine Injectable (LANTUS) 16 Unit(s) SubCutaneous at bedtime  insulin lispro (HumaLOG) corrective regimen sliding scale   SubCutaneous three times a day before meals  insulin lispro (HumaLOG) corrective regimen sliding scale   SubCutaneous at bedtime  insulin lispro Injectable (HumaLOG) 2 Unit(s) SubCutaneous three times a day before meals  levothyroxine 75 MICROGram(s) Oral daily  midodrine 30 milliGRAM(s) Oral three times a day  MIRACLE MOUTHWASH 30 milliLiter(s) Swish and Spit three times a day  Nephro-lynda 1 Tablet(s) Oral daily  polyethylene glycol 3350 17 Gram(s) Oral daily  predniSONE   Tablet 20 milliGRAM(s) Oral daily  senna 2 Tablet(s) Oral at bedtime  warfarin 3 milliGRAM(s) Oral once    MEDICATIONS  (PRN):  benzocaine 15 mG/menthol 3.6 mG Lozenge 1 Lozenge Oral every 6 hours PRN Sore Throat  dextrose Gel 1 Dose(s) Oral once PRN Blood Glucose LESS THAN 70 milliGRAM(s)/deciLiter  dextrose Gel 1 Dose(s) Oral once PRN Blood Glucose LESS THAN 70 milliGRAM(s)/deciliter  glucagon  Injectable 1 milliGRAM(s) IntraMuscular once PRN Glucose <70 milliGRAM(s)/deciLiter  guaiFENesin    Syrup 100 milliGRAM(s) Oral every 6 hours PRN Cough      PRESENT SYMPTOMS:  Source: [x ] Patient   [ ] Family   [ ] Team     Pain: [ ] YES [x ] NO  OLDCARTS:     Dyspnea: [x ] YES [ ] NO   Anxiety: [ ] YES [x ] NO  Fatigue: [x ] YES [ ] NO   Nausea: [ ] YES  [x ] NO  Loss of appetite: [ ] YES [x ] NO   Constipation: [x ] YES [ ] NO     Other Symptoms:  [x ] All other review of systems negative   [ ] Unable to obtain due to poor mentation     Karnofsky Performance Score/Palliative Performance Status Version 2:  40       %  Protein Calorie Malutrition:  [ ] Mild   [ ] Moderate   [ ] Severe     Vital Signs Last 24 Hrs  T(C): 36.3 (01 Mar 2018 12:59), Max: 36.4 (01 Mar 2018 00:40)  T(F): 97.3 (01 Mar 2018 12:59), Max: 97.6 (01 Mar 2018 00:40)  HR: 83 (01 Mar 2018 12:59) (82 - 97)  BP: 122/66 (01 Mar 2018 12:59) (104/65 - 124/74)  BP(mean): --  RR: 16 (01 Mar 2018 12:59) (16 - 18)  SpO2: 100% (01 Mar 2018 12:59) (88% - 100%)    Physical Exam:    General: [x ] Alert,  A&O x     [ ] lethargic   [ ] Agitated   [ ] Cachexia   HEENT: [ ] Normal   [x ] Dry mouth   [ ] ET Tube    [ ] Trach   Lungs: [ ] Clear [ x] Rhonchi  [ ] Crackles [ ] Wheezing [ ] Tachypnea  [ ] Audible excessive secretions  Faint  Cardiovascular:  [ ] Regular rate and rhythm  [ ] Irregular [ ] Tachycardia   [ ] Bradycardia LE edema bilaterally  Abdomen: [x ] Soft  [x ] Distended  [ ]  [ ] +BS  [ x] Non tender [ ] Tender  [ ]PEG   [ ] NGT   Last BM:     Genitourinary: [ ] Normal [ ] Incontinent   [ x] Oliguria/Anuria   [ ] Bellamy  Musculoskeletal:  [ ] Normal   [ x] Generalized weakness  [ ] Bedbound   Neurological: [ x] No focal deficits  [ ] Cognitive impairment     Skin: [  ] Normal   [ ] Pressure ulcers taut LE skin with a slight sheen    LABS:                        10.6   8.45  )-----------( 121      ( 01 Mar 2018 05:55 )             33.2     03-01    130<L>  |  92<L>  |  51<H>  ----------------------------<  299<H>  3.8   |  25  |  5.17<H>    Ca    8.7      01 Mar 2018 05:55  Phos  2.6     02-28  Mg     1.9     02-28    TPro  7.7  /  Alb  2.9<L>  /  TBili  0.5  /  DBili  x   /  AST  86<H>  /  ALT  84<H>  /  AlkPhos  245<H>  03-01    PT/INR - ( 01 Mar 2018 05:55 )   PT: 23.3 SEC;   INR: 2.00          PTT - ( 01 Mar 2018 05:55 )  PTT:44.2 SEC    I&O's Summary    28 Feb 2018 07:01  -  01 Mar 2018 07:00  --------------------------------------------------------  IN: 600 mL / OUT: 0 mL / NET: 600 mL        RADIOLOGY & ADDITIONAL STUDIES:

## 2018-03-12 NOTE — PROGRESS NOTE ADULT - ASSESSMENT
62 M PMH ESRD on HD, NICM with severe systolic dysfunction (EF 21%), on chronic dobutamine gtt via LUE PICC, also with AICD, chronic hypotension on midodrine 30mg TID, atrial fibrillation on warfarin, COPD, pulmonary fibrosis on home O2, initially presented with acute on chronic respiratory failure in setting of influenza B infection. He was managed in the CCU on norepinephrine - transition to dobutamine/midodrine.  Pt's respiratory status remains very tenuous, requiring additional sessions of HD for fluid removal.  Extensive family meeting 3/8, pt wants to remain full code; refuses any discussion revolving around hospice care.

## 2018-03-12 NOTE — PROGRESS NOTE ADULT - SUBJECTIVE AND OBJECTIVE BOX
Patient is a 62y old  Male who presents with a chief complaint of SOB x few hours (20 Feb 2018 18:05)      SUBJECTIVE / OVERNIGHT EVENTS: Pt remains short of breath.  States that he feels some relief right after HD, but then shortly becomes dyspneic hours after.  Pt adamantly wants to continue all forms of aggressive medical management.  Pt with 3-5 beats of NSVT on telemetry on a daily basis.       MEDICATIONS  (STANDING):  ALBUTerol/ipratropium for Nebulization 3 milliLiter(s) Nebulizer every 6 hours  amiodarone    Tablet 100 milliGRAM(s) Oral daily  aspirin enteric coated 81 milliGRAM(s) Oral daily  atorvastatin 80 milliGRAM(s) Oral at bedtime  buDESOnide   90 MICROgram(s) Inhaler 2 Puff(s) Inhalation two times a day  dextrose 5%. 1000 milliLiter(s) (50 mL/Hr) IV Continuous <Continuous>  dextrose 5%. 1000 milliLiter(s) (50 mL/Hr) IV Continuous <Continuous>  dextrose 50% Injectable 12.5 Gram(s) IV Push once  dextrose 50% Injectable 25 Gram(s) IV Push once  dextrose 50% Injectable 25 Gram(s) IV Push once  DOBUTamine Infusion 7.5 MICROgram(s)/kG/Min (16.942 mL/Hr) IV Continuous <Continuous>  docusate sodium 100 milliGRAM(s) Oral two times a day  finasteride 5 milliGRAM(s) Oral daily  influenza   Vaccine 0.5 milliLiter(s) IntraMuscular once  insulin glargine Injectable (LANTUS) 20 Unit(s) SubCutaneous at bedtime  insulin lispro (HumaLOG) corrective regimen sliding scale   SubCutaneous three times a day before meals  insulin lispro (HumaLOG) corrective regimen sliding scale   SubCutaneous at bedtime  insulin lispro Injectable (HumaLOG) 8 Unit(s) SubCutaneous three times a day before meals  levothyroxine 75 MICROGram(s) Oral daily  midodrine 30 milliGRAM(s) Oral three times a day  MIRACLE MOUTHWASH 30 milliLiter(s) Swish and Spit three times a day  Nephro-lynda 1 Tablet(s) Oral daily  polyethylene glycol 3350 17 Gram(s) Oral daily  predniSONE   Tablet   Oral   predniSONE   Tablet 5 milliGRAM(s) Oral daily  Saliva Substitute (CAPHOSOL) 30 milliLiter(s) Swish and Spit every 6 hours  senna 2 Tablet(s) Oral at bedtime  warfarin 6 milliGRAM(s) Oral once    MEDICATIONS  (PRN):  benzocaine 15 mG/menthol 3.6 mG Lozenge 1 Lozenge Oral every 6 hours PRN Sore Throat  dextrose Gel 1 Dose(s) Oral once PRN Blood Glucose LESS THAN 70 milliGRAM(s)/deciLiter  dextrose Gel 1 Dose(s) Oral once PRN Blood Glucose LESS THAN 70 milliGRAM(s)/deciliter  glucagon  Injectable 1 milliGRAM(s) IntraMuscular once PRN Glucose <70 milliGRAM(s)/deciLiter  guaiFENesin    Syrup 100 milliGRAM(s) Oral every 6 hours PRN Cough  sodium chloride 0.65% Nasal 1 Spray(s) Both Nostrils every 2 hours PRN Nasal Congestion      Vital Signs Last 24 Hrs  T(C): 36.3 (12 Mar 2018 08:30), Max: 36.6 (12 Mar 2018 06:12)  T(F): 97.3 (12 Mar 2018 08:30), Max: 97.8 (12 Mar 2018 06:12)  HR: 89 (12 Mar 2018 11:09) (68 - 100)  BP: 106/67 (12 Mar 2018 08:30) (101/66 - 124/69)  BP(mean): --  RR: 17 (12 Mar 2018 08:30) (17 - 18)  SpO2: 93% (12 Mar 2018 11:09) (90% - 96%)  CAPILLARY BLOOD GLUCOSE      POCT Blood Glucose.: 137 mg/dL (12 Mar 2018 12:53)  POCT Blood Glucose.: 167 mg/dL (12 Mar 2018 10:13)  POCT Blood Glucose.: 384 mg/dL (12 Mar 2018 10:10)  POCT Blood Glucose.: 148 mg/dL (12 Mar 2018 09:11)  POCT Blood Glucose.: 160 mg/dL (12 Mar 2018 03:48)  POCT Blood Glucose.: 61 mg/dL (12 Mar 2018 03:07)  POCT Blood Glucose.: 57 mg/dL (12 Mar 2018 02:47)  POCT Blood Glucose.: 62 mg/dL (12 Mar 2018 02:44)  POCT Blood Glucose.: 85 mg/dL (11 Mar 2018 21:53)  POCT Blood Glucose.: 52 mg/dL (11 Mar 2018 21:27)  POCT Blood Glucose.: 93 mg/dL (11 Mar 2018 17:44)      PHYSICAL EXAM  GENERAL: Moderate distress 2/2 SOB   HEAD:  Atraumatic, Normocephalic  EYES: EOMI, PERRLA, conjunctiva and sclera clear  NECK: +JVD  CHEST/LUNG: + Bibasilar crackles   HEART: Regular rate and rhythm; No murmurs, rubs, or gallops  ABDOMEN: Soft, Nontender, Nondistended; Bowel sounds present  EXTREMITIES:  3+ pitting edema in b/l LE   PSYCH: AAOx3  SKIN: No rashes or lesions    LABS:                        12.2   17.28 )-----------( 129      ( 12 Mar 2018 06:21 )             38.4     03-12    135  |  91<L>  |  73<H>  ----------------------------<  85  4.4   |  24  |  5.81<H>    Ca    9.0      12 Mar 2018 06:21  Mg     2.0     03-12      PT/INR - ( 12 Mar 2018 06:21 )   PT: 19.1 SEC;   INR: 1.70              Consultant(s) Notes Reviewed:  Palliative, Renal, Pulm     Care Discussed with Consultants/Other Providers: Palliative Dr. Patel

## 2018-03-12 NOTE — PROGRESS NOTE ADULT - PROBLEM SELECTOR PLAN 3
titrate oxygen to keep o2 sat>=90%  c/w duonebs and pulmicort  2/26 began prednisone 20mg po daily  2/28: cont steroids to see how he responds to it in next few days  3/1: cont steroids as well as BD  3/2: cont steroids  3/3:major issue is pulmonary fibrosis: cont current care:  3/14: cont current care:  3/5: can change pulmicort to symbicort:  3/6: cont low dose steroids  3/7: on steroids  3/8: steoids taper  3/9: Cont 10 mg of steroids at this time  3/10: cont 10 mg , will change to 5 mg soon  3/11: no wheezing: today : michael change to po steroids twice day : till her MARGIE is done tomorrow:  3/12: no wheezing: cont BD : taper steroids to off

## 2018-03-12 NOTE — PROGRESS NOTE ADULT - PROBLEM SELECTOR PLAN 1
has underlying pulmonary fibrosis and respiratory failure precipitated by influenza:   Titrate fio2 to keep o2 sat >=90% remains on 50% VM  NIV QHS/PRV  2/28: cont NIV at night and daytime too  3/1: says his breathing is better: would cont to use steroids as well as bipap at night time: Cont oxygen  to keep sao2 above 88%:  3/2: doing reasonable: cont current care:  3/3: decrease  steroids to 15 mg a day  3/4: given risk of increasing retention of salt and water , and he has been on dobutamine: would cut it down to 10 mg tomorrow and slowly taper it to off  3/5: It is very hard to determine whether steroids are beneficially improving his SOB: I doubt that steroids are of much help here: They may also potentiate salt and water retention! Will taper them off!  3/6: today sister says she would like to continue steroids for sometime : would continue for now and taper slowly: May be send home on 10 mg of steroids:  3/7: doing ok : cont current care: Has CMP and is on Dobutamine Drip  3/8: on dobutamine  3/9: cont dobutamine and night time BiPAP  3/10: cont current treatment : breathing wise he remains tenuous:  3/11: on dobutamine:  3/12: o n dobutamine:

## 2018-03-12 NOTE — PROGRESS NOTE ADULT - PROBLEM SELECTOR PLAN 9
Pt is not interested in discussion code status any further    He is not interested in using opiates for dyspnea.    His goals for treatment have been clearly delineated and he is unwilling to have palliative medicine assist with symptoms.  Thank you for the consult and feel free to reconsult when clinical needs arise

## 2018-03-12 NOTE — PROGRESS NOTE ADULT - PROBLEM SELECTOR PLAN 4
hd as per renal  keep net negative as able  3/1: on HD  3/4: on HD  3/9: cont HD per renal  3/11: on HD  3/12: on HD

## 2018-03-13 LAB
BUN SERPL-MCNC: 87 MG/DL — HIGH (ref 7–23)
CALCIUM SERPL-MCNC: 9 MG/DL — SIGNIFICANT CHANGE UP (ref 8.4–10.5)
CHLORIDE SERPL-SCNC: 89 MMOL/L — LOW (ref 98–107)
CO2 SERPL-SCNC: 23 MMOL/L — SIGNIFICANT CHANGE UP (ref 22–31)
CREAT SERPL-MCNC: 6.38 MG/DL — HIGH (ref 0.5–1.3)
GLUCOSE SERPL-MCNC: 198 MG/DL — HIGH (ref 70–99)
HCT VFR BLD CALC: 34.8 % — LOW (ref 39–50)
HGB BLD-MCNC: 11.2 G/DL — LOW (ref 13–17)
INR BLD: 2.73 — HIGH (ref 0.88–1.17)
MAGNESIUM SERPL-MCNC: 2.1 MG/DL — SIGNIFICANT CHANGE UP (ref 1.6–2.6)
MCHC RBC-ENTMCNC: 31.5 PG — SIGNIFICANT CHANGE UP (ref 27–34)
MCHC RBC-ENTMCNC: 32.2 % — SIGNIFICANT CHANGE UP (ref 32–36)
MCV RBC AUTO: 97.8 FL — SIGNIFICANT CHANGE UP (ref 80–100)
NRBC # FLD: 0 — SIGNIFICANT CHANGE UP
PLATELET # BLD AUTO: 123 K/UL — LOW (ref 150–400)
PMV BLD: 13.8 FL — HIGH (ref 7–13)
POTASSIUM SERPL-MCNC: 4.4 MMOL/L — SIGNIFICANT CHANGE UP (ref 3.5–5.3)
POTASSIUM SERPL-SCNC: 4.4 MMOL/L — SIGNIFICANT CHANGE UP (ref 3.5–5.3)
PROTHROM AB SERPL-ACNC: 32 SEC — HIGH (ref 9.8–13.1)
RBC # BLD: 3.56 M/UL — LOW (ref 4.2–5.8)
RBC # FLD: 18.8 % — HIGH (ref 10.3–14.5)
SODIUM SERPL-SCNC: 132 MMOL/L — LOW (ref 135–145)
WBC # BLD: 15.86 K/UL — HIGH (ref 3.8–10.5)
WBC # FLD AUTO: 15.86 K/UL — HIGH (ref 3.8–10.5)

## 2018-03-13 PROCEDURE — 99233 SBSQ HOSP IP/OBS HIGH 50: CPT

## 2018-03-13 RX ORDER — INSULIN GLARGINE 100 [IU]/ML
10 INJECTION, SOLUTION SUBCUTANEOUS AT BEDTIME
Qty: 0 | Refills: 0 | Status: COMPLETED | OUTPATIENT
Start: 2018-03-13 | End: 2018-03-13

## 2018-03-13 RX ORDER — WARFARIN SODIUM 2.5 MG/1
3 TABLET ORAL ONCE
Qty: 0 | Refills: 0 | Status: COMPLETED | OUTPATIENT
Start: 2018-03-13 | End: 2018-03-13

## 2018-03-13 RX ADMIN — Medication 3 MILLILITER(S): at 22:29

## 2018-03-13 RX ADMIN — Medication 30 MILLILITER(S): at 05:33

## 2018-03-13 RX ADMIN — Medication 3 MILLILITER(S): at 17:05

## 2018-03-13 RX ADMIN — ATORVASTATIN CALCIUM 80 MILLIGRAM(S): 80 TABLET, FILM COATED ORAL at 22:10

## 2018-03-13 RX ADMIN — DIPHENHYDRAMINE HYDROCHLORIDE AND LIDOCAINE HYDROCHLORIDE AND ALUMINUM HYDROXIDE AND MAGNESIUM HYDRO 30 MILLILITER(S): KIT at 13:30

## 2018-03-13 RX ADMIN — INSULIN GLARGINE 10 UNIT(S): 100 INJECTION, SOLUTION SUBCUTANEOUS at 22:20

## 2018-03-13 RX ADMIN — DIPHENHYDRAMINE HYDROCHLORIDE AND LIDOCAINE HYDROCHLORIDE AND ALUMINUM HYDROXIDE AND MAGNESIUM HYDRO 30 MILLILITER(S): KIT at 22:12

## 2018-03-13 RX ADMIN — Medication 75 MICROGRAM(S): at 05:33

## 2018-03-13 RX ADMIN — Medication 30 MILLILITER(S): at 12:46

## 2018-03-13 RX ADMIN — Medication 3: at 13:27

## 2018-03-13 RX ADMIN — Medication 3 MILLILITER(S): at 05:23

## 2018-03-13 RX ADMIN — Medication 81 MILLIGRAM(S): at 12:45

## 2018-03-13 RX ADMIN — Medication 2 PUFF(S): at 22:11

## 2018-03-13 RX ADMIN — Medication 8 UNIT(S): at 18:02

## 2018-03-13 RX ADMIN — MIDODRINE HYDROCHLORIDE 30 MILLIGRAM(S): 2.5 TABLET ORAL at 13:28

## 2018-03-13 RX ADMIN — Medication 8 UNIT(S): at 13:32

## 2018-03-13 RX ADMIN — Medication 8 UNIT(S): at 10:15

## 2018-03-13 RX ADMIN — Medication 5 MILLIGRAM(S): at 05:33

## 2018-03-13 RX ADMIN — MIDODRINE HYDROCHLORIDE 30 MILLIGRAM(S): 2.5 TABLET ORAL at 22:11

## 2018-03-13 RX ADMIN — AMIODARONE HYDROCHLORIDE 100 MILLIGRAM(S): 400 TABLET ORAL at 12:45

## 2018-03-13 RX ADMIN — POLYETHYLENE GLYCOL 3350 17 GRAM(S): 17 POWDER, FOR SOLUTION ORAL at 12:46

## 2018-03-13 RX ADMIN — Medication 1 TABLET(S): at 12:53

## 2018-03-13 RX ADMIN — Medication 30 MILLILITER(S): at 18:00

## 2018-03-13 RX ADMIN — DIPHENHYDRAMINE HYDROCHLORIDE AND LIDOCAINE HYDROCHLORIDE AND ALUMINUM HYDROXIDE AND MAGNESIUM HYDRO 30 MILLILITER(S): KIT at 05:34

## 2018-03-13 RX ADMIN — FINASTERIDE 5 MILLIGRAM(S): 5 TABLET, FILM COATED ORAL at 12:45

## 2018-03-13 RX ADMIN — WARFARIN SODIUM 3 MILLIGRAM(S): 2.5 TABLET ORAL at 18:57

## 2018-03-13 RX ADMIN — MIDODRINE HYDROCHLORIDE 30 MILLIGRAM(S): 2.5 TABLET ORAL at 05:34

## 2018-03-13 NOTE — PROGRESS NOTE ADULT - PROBLEM SELECTOR PLAN 3
titrate oxygen to keep o2 sat>=90%  c/w suman and pulmicort  2/26 began prednisone 20mg po daily  2/28: cont steroids to see how he responds to it in next few days  3/1: cont steroids as well as BD  3/2: cont steroids  3/3:major issue is pulmonary fibrosis: cont current care:  3/14: cont current care:  3/5: can change pulmicort to symbicort:  3/6: cont low dose steroids  3/7: on steroids  3/8: steoids taper  3/9: Cont 10 mg of steroids at this time  3/10: cont 10 mg , will change to 5 mg soon  3/11: no wheezing: today : michael change to po steroids twice day : till her MARGIE is done tomorrow:  3/12: no wheezing: cont BD : taper steroids to off  3/13: Cont Pulmicort: doubt he will be able to use Symbicort properly:

## 2018-03-13 NOTE — PROGRESS NOTE ADULT - SUBJECTIVE AND OBJECTIVE BOX
Patient is a 62y old  Male who presents with a chief complaint of SOB x few hours (20 Feb 2018 18:05)      Any change in ROS:   doing ok   no SOB at rest: but does feel it off and on       MEDICATIONS  (STANDING):  ALBUTerol/ipratropium for Nebulization 3 milliLiter(s) Nebulizer every 6 hours  amiodarone    Tablet 100 milliGRAM(s) Oral daily  aspirin enteric coated 81 milliGRAM(s) Oral daily  atorvastatin 80 milliGRAM(s) Oral at bedtime  buDESOnide   90 MICROgram(s) Inhaler 2 Puff(s) Inhalation two times a day  dextrose 5%. 1000 milliLiter(s) (50 mL/Hr) IV Continuous <Continuous>  dextrose 5%. 1000 milliLiter(s) (50 mL/Hr) IV Continuous <Continuous>  dextrose 50% Injectable 12.5 Gram(s) IV Push once  dextrose 50% Injectable 25 Gram(s) IV Push once  dextrose 50% Injectable 25 Gram(s) IV Push once  DOBUTamine Infusion 7.5 MICROgram(s)/kG/Min (16.942 mL/Hr) IV Continuous <Continuous>  docusate sodium 100 milliGRAM(s) Oral two times a day  finasteride 5 milliGRAM(s) Oral daily  influenza   Vaccine 0.5 milliLiter(s) IntraMuscular once  insulin glargine Injectable (LANTUS) 20 Unit(s) SubCutaneous at bedtime  insulin lispro (HumaLOG) corrective regimen sliding scale   SubCutaneous three times a day before meals  insulin lispro (HumaLOG) corrective regimen sliding scale   SubCutaneous at bedtime  insulin lispro Injectable (HumaLOG) 8 Unit(s) SubCutaneous three times a day before meals  levothyroxine 75 MICROGram(s) Oral daily  midodrine 30 milliGRAM(s) Oral three times a day  MIRACLE MOUTHWASH 30 milliLiter(s) Swish and Spit three times a day  Nephro-lynda 1 Tablet(s) Oral daily  polyethylene glycol 3350 17 Gram(s) Oral daily  predniSONE   Tablet   Oral   predniSONE   Tablet 5 milliGRAM(s) Oral daily  Saliva Substitute (CAPHOSOL) 30 milliLiter(s) Swish and Spit every 6 hours  senna 2 Tablet(s) Oral at bedtime    MEDICATIONS  (PRN):  benzocaine 15 mG/menthol 3.6 mG Lozenge 1 Lozenge Oral every 6 hours PRN Sore Throat  dextrose Gel 1 Dose(s) Oral once PRN Blood Glucose LESS THAN 70 milliGRAM(s)/deciLiter  dextrose Gel 1 Dose(s) Oral once PRN Blood Glucose LESS THAN 70 milliGRAM(s)/deciliter  glucagon  Injectable 1 milliGRAM(s) IntraMuscular once PRN Glucose <70 milliGRAM(s)/deciLiter  guaiFENesin    Syrup 100 milliGRAM(s) Oral every 6 hours PRN Cough  guaiFENesin   Syrup  (Sugar-Free) 200 milliGRAM(s) Oral every 6 hours PRN Cough  sodium chloride 0.65% Nasal 1 Spray(s) Both Nostrils every 2 hours PRN Nasal Congestion    Vital Signs Last 24 Hrs  T(C): 36.3 (13 Mar 2018 11:14), Max: 36.6 (12 Mar 2018 17:31)  T(F): 97.4 (13 Mar 2018 11:14), Max: 97.9 (12 Mar 2018 17:31)  HR: 78 (13 Mar 2018 10:45) (75 - 91)  BP: 110/58 (13 Mar 2018 11:14) (93/63 - 115/61)  BP(mean): 59 (12 Mar 2018 15:25) (59 - 59)  RR: 18 (13 Mar 2018 11:14) (16 - 18)  SpO2: 99% (13 Mar 2018 11:14) (90% - 99%)    I&O's Summary    12 Mar 2018 07:01  -  13 Mar 2018 07:00  --------------------------------------------------------  IN: 480 mL / OUT: 2400 mL / NET: -1920 mL    13 Mar 2018 07:01  -  13 Mar 2018 12:16  --------------------------------------------------------  IN: 500 mL / OUT: 3000 mL / NET: -2500 mL          Physical Exam:   GENERAL: NAD, well-groomed, well-developed  HEENT: BELL/   Atraumatic, Normocephalic  ENMT: No tonsillar erythema, exudates, or enlargement; Moist mucous membranes, Good dentition, No lesions  NECK: Supple, No JVD, Normal thyroid  CHEST/LUNG: Scattered crackles+   CVS: Regular rate and rhythm; No murmurs, rubs, or gallops  GI: : Soft, Nontender, Nondistended; Bowel sounds present  NERVOUS SYSTEM:  Alert & Oriented X3  EXTREMITIES:  2+ Peripheral Pulses, No clubbing, cyanosis, or edema  LYMPH: No lymphadenopathy noted  SKIN: No rashes or lesions  ENDOCRINOLOGY: No Thyromegaly  PSYCH: Appropriate    Labs:                              11.2   15.86 )-----------( 123      ( 13 Mar 2018 05:15 )             34.8                         12.2   17.28 )-----------( 129      ( 12 Mar 2018 06:21 )             38.4                         11.4   11.64 )-----------( 130      ( 11 Mar 2018 06:15 )             35.5                         11.2   10.96 )-----------( 126      ( 10 Mar 2018 06:15 )             35.6     03-13    132<L>  |  89<L>  |  87<H>  ----------------------------<  198<H>  4.4   |  23  |  6.38<H>  03-12    135  |  91<L>  |  73<H>  ----------------------------<  85  4.4   |  24  |  5.81<H>  03-11    135  |  94<L>  |  48<H>  ----------------------------<  158<H>  3.6   |  24  |  4.32<H>  03-10    134<L>  |  90<L>  |  73<H>  ----------------------------<  176<H>  4.1   |  26  |  6.03<H>    Ca    9.0      13 Mar 2018 05:15  Ca    9.0      12 Mar 2018 06:21  Mg     2.1     03-13  Mg     2.0     03-12      CAPILLARY BLOOD GLUCOSE      POCT Blood Glucose.: 106 mg/dL (13 Mar 2018 10:19)  POCT Blood Glucose.: 149 mg/dL (12 Mar 2018 21:55)  POCT Blood Glucose.: 226 mg/dL (12 Mar 2018 18:03)  POCT Blood Glucose.: 137 mg/dL (12 Mar 2018 12:53)        PT/INR - ( 13 Mar 2018 05:15 )   PT: 32.0 SEC;   INR: 2.73              Cultures:           < from: Xray Chest 1 View- PORTABLE-Urgent (02.26.18 @ 11:27) >  TECHNIQUE: AP chest radiograph    COMPARISON: Chest radiograph performed 2/17/2018. CT abdomen and pelvis   performed 10/30/2017.    FINDINGS:    A right-sided dialysis catheter terminates over SVC. There is a   left-sided cardiac device. There is redemonstration of diffuse   interstitial and reticular opacities bilaterally, mildly improved since   2/17/2018. There are small bilateral pleural effusions.    IMPRESSION:    Persistent diffuse interstitial and reticular lung opacities bilaterally,   mildly improved since 2/17/2018, which may represent pulmonary edema   superimposed on patient's known interstitial lung disease.    Small bilateral pleural effusions.              ANNIA MURPHY M.D., RADIOLOGY RESIDENT  This document has been electronically signed.  NITIN MAHARAJ M.D. ATTENDING RADIOLOGIST  This document has been electronically signed. Feb 26 2018  2:54PM        < end of copied text >                    Studies  Chest X-RAY  CT SCAN Chest   Venous Dopplers: LE:   CT Abdomen  Others

## 2018-03-13 NOTE — PROGRESS NOTE ADULT - PROBLEM SELECTOR PLAN 5
HR controlled  ac as per cards/medicine  management as per cards  3/1: INR is therapeutic:  3/3: INR is therapeutic  3/5: INR therapeutic::  3/9: INR is therapeutic:  3/11: INR is low:  3/12: 1.70  3/13: HR is controlled:: On AC per primary/Cardiology

## 2018-03-13 NOTE — PROGRESS NOTE ADULT - PROBLEM SELECTOR PLAN 1
has underlying pulmonary fibrosis and respiratory failure precipitated by influenza:   Titrate fio2 to keep o2 sat >=90% remains on 50% VM  NIV QHS/PRV  2/28: cont NIV at night and daytime too  3/1: says his breathing is better: would cont to use steroids as well as bipap at night time: Cont oxygen  to keep sao2 above 88%:  3/2: doing reasonable: cont current care:  3/3: decrease  steroids to 15 mg a day  3/4: given risk of increasing retention of salt and water , and he has been on dobutamine: would cut it down to 10 mg tomorrow and slowly taper it to off  3/5: It is very hard to determine whether steroids are beneficially improving his SOB: I doubt that steroids are of much help here: They may also potentiate salt and water retention! Will taper them off!  3/6: today sister says she would like to continue steroids for sometime : would continue for now and taper slowly: May be send home on 10 mg of steroids:  3/7: doing ok : cont current care: Has CMP and is on Dobutamine Drip  3/8: on dobutamine  3/9: cont dobutamine and night time BiPAP  3/10: cont current treatment : breathing wise he remains tenuous:  3/11: on dobutamine:  3/12: o n dobutamine:  3/13: to cont dobutamine: Pt is on night time bipap: But he has not used it in last few days but would like to have one at home upon dc: May arrange for it : Pt encouraged to use the bipap daily:

## 2018-03-13 NOTE — PROGRESS NOTE ADULT - PROBLEM SELECTOR PLAN 2
: supportive treatment:  titrate oxygen to keep o2 sat>=90%  2/26 began trial prednisone for sob-prednisone 20 mg po daily  2/28: started on a trial of prednisone two days ago, will monitor, however per his family, he never had a good response to previous trial of steroid.  3/1: on retrial of steroids: Pt thinks his breathing is better then before: Would like to cont steroids at this t alirio  3/2: cont steroids: pt seems to have stable SOB , does not seem to be improving further to me:  3/3: decrease steroids to 15 mg ad ay  3/4: on 15 mg today , decrease to 10 mg a day  3/5: taper off prednisone: already ordered  3*6: cont steroids  3/7: doing pretty poor: cont steroids trial for some time  3/8: on steroids at tis time: low dose steroids  3/9: It is difficult to discern if steroids are helpful in him: However the pt as well as the sister insists that we cont to try steroids: They think he feels a little better with it: Side effects have been explained to them:  3/10: cont 10 mg today too ? decrease to 5 mg soon  3/11: on steroids:  3/12: pt is on 5 mg of steroids: will taper  to off on next few days:  3/13: pt has been doing poorly: cont prednisone till 16 of March.

## 2018-03-13 NOTE — PROGRESS NOTE ADULT - PROBLEM SELECTOR PLAN 2
- Pt remains severely volume overloaded and there is no end point to adequate removal of his volume.  We have extensively discussed this with pt and he wants to stay in the hospital as long as it takes.   - On dobutamine infusion - HD/UF per renal.  Discussed with renal (Dr. Ridley), who has attempted to additional sessions of UF/HD as outpatient, but pt ultimately bounces back to hospital.    - As per cardio, no indication for TTE with bubble for R to L shunt eval because patient is not candidate for possible PFO repair at this time.  - Pt unable to take BB or ACE/ARB as he is chronically hypotensive on Midodrine  - Inpatient HF team consulted for 2nd opinion.  Pt is not a candidate for any long term HF treatment.

## 2018-03-13 NOTE — PROGRESS NOTE ADULT - PROBLEM SELECTOR PLAN 1
s/p HD on 3/10 - s/p uf session on 3/9  had Puff yesterday  Had HD today, 2 kg removed  still overhydratedwill consider rpt Puff tomorrow  c/w midodrine  c/w renal diet, fluid restriction 1.2L/day

## 2018-03-13 NOTE — PROGRESS NOTE ADULT - PROBLEM SELECTOR PLAN 8
- Pt wishes to remain full code; states that even if he's dependent on machines, he wants to do everything to stay alive.  - Very grave prognosis, pt may decompensate this admission.  Family fully aware of pt's decision to remain full code.

## 2018-03-13 NOTE — PROGRESS NOTE ADULT - SUBJECTIVE AND OBJECTIVE BOX
Patient is a 62y old  Male who presents with a chief complaint of SOB x few hours (20 Feb 2018 18:05)      SUBJECTIVE / OVERNIGHT EVENTS: Pt seen during HD today, feels that his SOB is slightly better today, but always seems this way during HD.  No other acute complaints.       MEDICATIONS  (STANDING):  ALBUTerol/ipratropium for Nebulization 3 milliLiter(s) Nebulizer every 6 hours  amiodarone    Tablet 100 milliGRAM(s) Oral daily  aspirin enteric coated 81 milliGRAM(s) Oral daily  atorvastatin 80 milliGRAM(s) Oral at bedtime  buDESOnide   90 MICROgram(s) Inhaler 2 Puff(s) Inhalation two times a day  dextrose 5%. 1000 milliLiter(s) (50 mL/Hr) IV Continuous <Continuous>  dextrose 5%. 1000 milliLiter(s) (50 mL/Hr) IV Continuous <Continuous>  dextrose 50% Injectable 12.5 Gram(s) IV Push once  dextrose 50% Injectable 25 Gram(s) IV Push once  dextrose 50% Injectable 25 Gram(s) IV Push once  DOBUTamine Infusion 7.5 MICROgram(s)/kG/Min (16.942 mL/Hr) IV Continuous <Continuous>  docusate sodium 100 milliGRAM(s) Oral two times a day  finasteride 5 milliGRAM(s) Oral daily  influenza   Vaccine 0.5 milliLiter(s) IntraMuscular once  insulin glargine Injectable (LANTUS) 20 Unit(s) SubCutaneous at bedtime  insulin lispro (HumaLOG) corrective regimen sliding scale   SubCutaneous three times a day before meals  insulin lispro (HumaLOG) corrective regimen sliding scale   SubCutaneous at bedtime  insulin lispro Injectable (HumaLOG) 8 Unit(s) SubCutaneous three times a day before meals  levothyroxine 75 MICROGram(s) Oral daily  midodrine 30 milliGRAM(s) Oral three times a day  MIRACLE MOUTHWASH 30 milliLiter(s) Swish and Spit three times a day  Nephro-lynda 1 Tablet(s) Oral daily  polyethylene glycol 3350 17 Gram(s) Oral daily  predniSONE   Tablet   Oral   predniSONE   Tablet 5 milliGRAM(s) Oral daily  Saliva Substitute (CAPHOSOL) 30 milliLiter(s) Swish and Spit every 6 hours  senna 2 Tablet(s) Oral at bedtime    MEDICATIONS  (PRN):  benzocaine 15 mG/menthol 3.6 mG Lozenge 1 Lozenge Oral every 6 hours PRN Sore Throat  dextrose Gel 1 Dose(s) Oral once PRN Blood Glucose LESS THAN 70 milliGRAM(s)/deciLiter  dextrose Gel 1 Dose(s) Oral once PRN Blood Glucose LESS THAN 70 milliGRAM(s)/deciliter  glucagon  Injectable 1 milliGRAM(s) IntraMuscular once PRN Glucose <70 milliGRAM(s)/deciLiter  guaiFENesin    Syrup 100 milliGRAM(s) Oral every 6 hours PRN Cough  guaiFENesin   Syrup  (Sugar-Free) 200 milliGRAM(s) Oral every 6 hours PRN Cough  sodium chloride 0.65% Nasal 1 Spray(s) Both Nostrils every 2 hours PRN Nasal Congestion      Vital Signs Last 24 Hrs  T(C): 36.3 (13 Mar 2018 11:14), Max: 36.6 (12 Mar 2018 17:31)  T(F): 97.4 (13 Mar 2018 11:14), Max: 97.9 (12 Mar 2018 17:31)  HR: 78 (13 Mar 2018 10:45) (75 - 91)  BP: 110/58 (13 Mar 2018 11:14) (93/63 - 115/61)  BP(mean): 59 (12 Mar 2018 15:25) (59 - 59)  RR: 18 (13 Mar 2018 11:14) (16 - 18)  SpO2: 99% (13 Mar 2018 11:14) (90% - 99%)  CAPILLARY BLOOD GLUCOSE      POCT Blood Glucose.: 280 mg/dL (13 Mar 2018 13:13)  POCT Blood Glucose.: 106 mg/dL (13 Mar 2018 10:19)  POCT Blood Glucose.: 149 mg/dL (12 Mar 2018 21:55)  POCT Blood Glucose.: 226 mg/dL (12 Mar 2018 18:03)    I&O's Summary    12 Mar 2018 07:01  -  13 Mar 2018 07:00  --------------------------------------------------------  IN: 480 mL / OUT: 2400 mL / NET: -1920 mL    13 Mar 2018 07:01  -  13 Mar 2018 13:29  --------------------------------------------------------  IN: 500 mL / OUT: 3000 mL / NET: -2500 mL          PHYSICAL EXAM  GENERAL: chronically ill appearing, mild distress 2/2 SOB   HEAD:  Atraumatic, Normocephalic  EYES: EOMI, PERRLA, conjunctiva and sclera clear  NECK: +JVD  CHEST/LUNG: +bibasilar crackles  HEART: Regular rate and rhythm; No murmurs, rubs, or gallops  ABDOMEN: Soft, Nontender, Nondistended; Bowel sounds present  EXTREMITIES:  3+ pitting edema in b/l LE   PSYCH: AAOx3  SKIN: No rashes or lesions    LABS:                        11.2   15.86 )-----------( 123      ( 13 Mar 2018 05:15 )             34.8     03-13    132<L>  |  89<L>  |  87<H>  ----------------------------<  198<H>  4.4   |  23  |  6.38<H>    Ca    9.0      13 Mar 2018 05:15  Mg     2.1     03-13      PT/INR - ( 13 Mar 2018 05:15 )   PT: 32.0 SEC;   INR: 2.73              Consultant(s) Notes Reviewed:  Renal, Pulm, Palliative     Care Discussed with Consultants/Other Providers: Renal Dr. Ridley, HF team

## 2018-03-13 NOTE — PROGRESS NOTE ADULT - PROBLEM SELECTOR PLAN 6
monitor blood glucose: defer to primary care:  3/1: blood glucose is  uncontrolled: ? secondary to steroids: would defer to endo  3/2: still uncontrolled: defer to primary care:  3/6: Blood glucose in 200-300's: Defer to primary  3/9: Reasonable  3/13: Blood glucose controlled

## 2018-03-13 NOTE — PROGRESS NOTE ADULT - SUBJECTIVE AND OBJECTIVE BOX
Pt seen and examined at bedside  just returned from dialysis  dyspnea better after dialysis  still with dyspnea, base-line, with nasal O2  no chest pain,dizziness  no  nausea,vomiting      Allergies:  No Known Allergies    Hospital Medications:   MEDICATIONS  (STANDING):  ALBUTerol/ipratropium for Nebulization 3 milliLiter(s) Nebulizer every 6 hours  amiodarone    Tablet 100 milliGRAM(s) Oral daily  aspirin enteric coated 81 milliGRAM(s) Oral daily  atorvastatin 80 milliGRAM(s) Oral at bedtime  buDESOnide   90 MICROgram(s) Inhaler 2 Puff(s) Inhalation two times a day  dextrose 5%. 1000 milliLiter(s) (50 mL/Hr) IV Continuous <Continuous>  dextrose 5%. 1000 milliLiter(s) (50 mL/Hr) IV Continuous <Continuous>  dextrose 50% Injectable 12.5 Gram(s) IV Push once  dextrose 50% Injectable 25 Gram(s) IV Push once  dextrose 50% Injectable 25 Gram(s) IV Push once  DOBUTamine Infusion 7.5 MICROgram(s)/kG/Min (16.942 mL/Hr) IV Continuous <Continuous>  docusate sodium 100 milliGRAM(s) Oral two times a day  finasteride 5 milliGRAM(s) Oral daily  influenza   Vaccine 0.5 milliLiter(s) IntraMuscular once  insulin glargine Injectable (LANTUS) 20 Unit(s) SubCutaneous at bedtime  insulin lispro (HumaLOG) corrective regimen sliding scale   SubCutaneous three times a day before meals  insulin lispro (HumaLOG) corrective regimen sliding scale   SubCutaneous at bedtime  insulin lispro Injectable (HumaLOG) 8 Unit(s) SubCutaneous three times a day before meals  levothyroxine 75 MICROGram(s) Oral daily  midodrine 30 milliGRAM(s) Oral three times a day  MIRACLE MOUTHWASH 30 milliLiter(s) Swish and Spit three times a day  Nephro-lynda 1 Tablet(s) Oral daily  polyethylene glycol 3350 17 Gram(s) Oral daily  predniSONE   Tablet   Oral   predniSONE   Tablet 5 milliGRAM(s) Oral daily  Saliva Substitute (CAPHOSOL) 30 milliLiter(s) Swish and Spit every 6 hours  senna 2 Tablet(s) Oral at bedtime         VITALS:  T(F): 97.4 (03-13-18 @ 11:14), Max: 97.9 (03-12-18 @ 17:31)  HR: 78 (03-13-18 @ 10:45)  BP: 110/58 (03-13-18 @ 11:14)  RR: 18 (03-13-18 @ 11:14)  SpO2: 99% (03-13-18 @ 11:14)  Wt(kg): --    03-12 @ 07:01  -  03-13 @ 07:00  --------------------------------------------------------  IN: 480 mL / OUT: 2400 mL / NET: -1920 mL    03-13 @ 07:01  -  03-13 @ 12:15  --------------------------------------------------------  IN: 500 mL / OUT: 3000 mL / NET: -2500 mL        PHYSICAL EXAM:  Constitutional: NAD, on nasal O2., sitting comfortably in bed  HEENT: anicteric sclera, oropharynx clear, MMM  Neck: No JVD  Respiratory: bibasilar crepts, lt > rt  Cardiovascular: S1, S2, irreg  Gastrointestinal: BS+, soft, NT/ND  Extremities: No cyanosis or clubbing. 3+ leg edema  Neurological: A/O x 3, no focal deficits  Psychiatric: Normal mood, normal affect  : No CVA tenderness. No rosa.   Skin: No rashes  Vascular Access:    LABS:  03-13    132<L>  |  89<L>  |  87<H>  ----------------------------<  198<H>  4.4   |  23  |  6.38<H>    Ca    9.0      13 Mar 2018 05:15  Mg     2.1     03-13      Creatinine Trend: 6.38 <--, 5.81 <--, 4.32 <--, 6.03 <--, 4.62 <--, 5.91 <--, 5.04 <--                        11.2   15.86 )-----------( 123      ( 13 Mar 2018 05:15 )             34.8     Urine Studies:      RADIOLOGY & ADDITIONAL STUDIES:

## 2018-03-13 NOTE — PROGRESS NOTE ADULT - PROBLEM SELECTOR PLAN 1
- Multifactorial due to COPD, pulmonary fibrosis and end stage heart failure.  No additional means of therapy will reverse his chronic illnesses  - Supplemental oxygen to maintain O2 sat >88% on PO steroids (on taper).   - BiPAP QHS

## 2018-03-14 LAB
BUN SERPL-MCNC: 57 MG/DL — HIGH (ref 7–23)
CALCIUM SERPL-MCNC: 8.5 MG/DL — SIGNIFICANT CHANGE UP (ref 8.4–10.5)
CHLORIDE SERPL-SCNC: 92 MMOL/L — LOW (ref 98–107)
CO2 SERPL-SCNC: 25 MMOL/L — SIGNIFICANT CHANGE UP (ref 22–31)
CREAT SERPL-MCNC: 4.49 MG/DL — HIGH (ref 0.5–1.3)
GLUCOSE SERPL-MCNC: 102 MG/DL — HIGH (ref 70–99)
HCT VFR BLD CALC: 35 % — LOW (ref 39–50)
HGB BLD-MCNC: 11.4 G/DL — LOW (ref 13–17)
INR BLD: 3.25 — HIGH (ref 0.88–1.17)
MAGNESIUM SERPL-MCNC: 2.1 MG/DL — SIGNIFICANT CHANGE UP (ref 1.6–2.6)
MCHC RBC-ENTMCNC: 31.9 PG — SIGNIFICANT CHANGE UP (ref 27–34)
MCHC RBC-ENTMCNC: 32.6 % — SIGNIFICANT CHANGE UP (ref 32–36)
MCV RBC AUTO: 98 FL — SIGNIFICANT CHANGE UP (ref 80–100)
NRBC # FLD: 0 — SIGNIFICANT CHANGE UP
PHOSPHATE SERPL-MCNC: 4.4 MG/DL — SIGNIFICANT CHANGE UP (ref 2.5–4.5)
PLATELET # BLD AUTO: 114 K/UL — LOW (ref 150–400)
PMV BLD: 13.3 FL — HIGH (ref 7–13)
POTASSIUM SERPL-MCNC: 4.1 MMOL/L — SIGNIFICANT CHANGE UP (ref 3.5–5.3)
POTASSIUM SERPL-SCNC: 4.1 MMOL/L — SIGNIFICANT CHANGE UP (ref 3.5–5.3)
PROTHROM AB SERPL-ACNC: 36.9 SEC — HIGH (ref 9.8–13.1)
RBC # BLD: 3.57 M/UL — LOW (ref 4.2–5.8)
RBC # FLD: 18.7 % — HIGH (ref 10.3–14.5)
SODIUM SERPL-SCNC: 134 MMOL/L — LOW (ref 135–145)
WBC # BLD: 10.06 K/UL — SIGNIFICANT CHANGE UP (ref 3.8–10.5)
WBC # FLD AUTO: 10.06 K/UL — SIGNIFICANT CHANGE UP (ref 3.8–10.5)

## 2018-03-14 PROCEDURE — 99233 SBSQ HOSP IP/OBS HIGH 50: CPT

## 2018-03-14 RX ADMIN — INSULIN GLARGINE 20 UNIT(S): 100 INJECTION, SOLUTION SUBCUTANEOUS at 22:22

## 2018-03-14 RX ADMIN — Medication 2 PUFF(S): at 22:21

## 2018-03-14 RX ADMIN — DIPHENHYDRAMINE HYDROCHLORIDE AND LIDOCAINE HYDROCHLORIDE AND ALUMINUM HYDROXIDE AND MAGNESIUM HYDRO 30 MILLILITER(S): KIT at 14:21

## 2018-03-14 RX ADMIN — Medication 8 UNIT(S): at 14:19

## 2018-03-14 RX ADMIN — Medication 8 UNIT(S): at 17:38

## 2018-03-14 RX ADMIN — Medication 8 UNIT(S): at 09:16

## 2018-03-14 RX ADMIN — Medication 75 MICROGRAM(S): at 06:01

## 2018-03-14 RX ADMIN — Medication 1 TABLET(S): at 14:19

## 2018-03-14 RX ADMIN — Medication 3 MILLILITER(S): at 22:58

## 2018-03-14 RX ADMIN — Medication 30 MILLILITER(S): at 06:01

## 2018-03-14 RX ADMIN — MIDODRINE HYDROCHLORIDE 30 MILLIGRAM(S): 2.5 TABLET ORAL at 14:20

## 2018-03-14 RX ADMIN — MIDODRINE HYDROCHLORIDE 30 MILLIGRAM(S): 2.5 TABLET ORAL at 06:01

## 2018-03-14 RX ADMIN — Medication 30 MILLILITER(S): at 14:20

## 2018-03-14 RX ADMIN — FINASTERIDE 5 MILLIGRAM(S): 5 TABLET, FILM COATED ORAL at 14:19

## 2018-03-14 RX ADMIN — AMIODARONE HYDROCHLORIDE 100 MILLIGRAM(S): 400 TABLET ORAL at 06:01

## 2018-03-14 RX ADMIN — DIPHENHYDRAMINE HYDROCHLORIDE AND LIDOCAINE HYDROCHLORIDE AND ALUMINUM HYDROXIDE AND MAGNESIUM HYDRO 30 MILLILITER(S): KIT at 22:22

## 2018-03-14 RX ADMIN — MIDODRINE HYDROCHLORIDE 30 MILLIGRAM(S): 2.5 TABLET ORAL at 22:22

## 2018-03-14 RX ADMIN — DIPHENHYDRAMINE HYDROCHLORIDE AND LIDOCAINE HYDROCHLORIDE AND ALUMINUM HYDROXIDE AND MAGNESIUM HYDRO 30 MILLILITER(S): KIT at 06:01

## 2018-03-14 RX ADMIN — Medication 3 MILLILITER(S): at 03:51

## 2018-03-14 RX ADMIN — Medication 2: at 17:38

## 2018-03-14 RX ADMIN — Medication 30 MILLILITER(S): at 17:38

## 2018-03-14 RX ADMIN — Medication 81 MILLIGRAM(S): at 14:19

## 2018-03-14 RX ADMIN — Medication 3: at 09:16

## 2018-03-14 RX ADMIN — Medication 2: at 14:19

## 2018-03-14 RX ADMIN — Medication 5 MILLIGRAM(S): at 06:01

## 2018-03-14 RX ADMIN — Medication 2 PUFF(S): at 09:15

## 2018-03-14 RX ADMIN — ATORVASTATIN CALCIUM 80 MILLIGRAM(S): 80 TABLET, FILM COATED ORAL at 22:21

## 2018-03-14 NOTE — PROGRESS NOTE ADULT - PROBLEM SELECTOR PLAN 3
titrate oxygen to keep o2 sat>=90%  c/w suman and pulmicort  2/26 began prednisone 20mg po daily  2/28: cont steroids to see how he responds to it in next few days  3/1: cont steroids as well as BD  3/2: cont steroids  3/3:major issue is pulmonary fibrosis: cont current care:  3/14: cont current care:  3/5: can change pulmicort to symbicort:  3/6: cont low dose steroids  3/7: on steroids  3/8: steoids taper  3/9: Cont 10 mg of steroids at this time  3/10: cont 10 mg , will change to 5 mg soon  3/11: no wheezing: today : michael change to po steroids twice day : till her MARGIE is done tomorrow:  3/12: no wheezing: cont BD : taper steroids to off  3/13: Cont Pulmicort: doubt he will be able to use Symbicort properly:  3/14: cont pulmicort as well as bronchodilators:

## 2018-03-14 NOTE — PROGRESS NOTE ADULT - PROBLEM SELECTOR PLAN 1
has underlying pulmonary fibrosis and respiratory failure precipitated by influenza:   Titrate fio2 to keep o2 sat >=90% remains on 50% VM  NIV QHS/PRV  2/28: cont NIV at night and daytime too  3/1: says his breathing is better: would cont to use steroids as well as bipap at night time: Cont oxygen  to keep sao2 above 88%:  3/2: doing reasonable: cont current care:  3/3: decrease  steroids to 15 mg a day  3/4: given risk of increasing retention of salt and water , and he has been on dobutamine: would cut it down to 10 mg tomorrow and slowly taper it to off  3/5: It is very hard to determine whether steroids are beneficially improving his SOB: I doubt that steroids are of much help here: They may also potentiate salt and water retention! Will taper them off!  3/6: today sister says she would like to continue steroids for sometime : would continue for now and taper slowly: May be send home on 10 mg of steroids:  3/7: doing ok : cont current care: Has CMP and is on Dobutamine Drip  3/8: on dobutamine  3/9: cont dobutamine and night time BiPAP  3/10: cont current treatment : breathing wise he remains tenuous:  3/11: on dobutamine:  3/12: o n dobutamine:  3/13: to cont dobutamine: Pt is on night time bipap: But he has not used it in last few days but would like to have one at home upon dc: May arrange for it : Pt encouraged to use the bipap daily:  3/14: doing ok : cont current treatment: finish steroids on 16th

## 2018-03-14 NOTE — PROGRESS NOTE ADULT - SUBJECTIVE AND OBJECTIVE BOX
Patient is a 62y old  Male who presents with a chief complaint of SOB (20 Feb 2018 18:05)      SUBJECTIVE / OVERNIGHT EVENTS: Feels slightly better today.  First time I've seen Mr. Plascencia able to sit back in his bed.  Still very weak and swollen in lower extremities.       MEDICATIONS  (STANDING):  ALBUTerol/ipratropium for Nebulization 3 milliLiter(s) Nebulizer every 6 hours  amiodarone    Tablet 100 milliGRAM(s) Oral daily  aspirin enteric coated 81 milliGRAM(s) Oral daily  atorvastatin 80 milliGRAM(s) Oral at bedtime  buDESOnide   90 MICROgram(s) Inhaler 2 Puff(s) Inhalation two times a day  dextrose 5%. 1000 milliLiter(s) (50 mL/Hr) IV Continuous <Continuous>  dextrose 5%. 1000 milliLiter(s) (50 mL/Hr) IV Continuous <Continuous>  dextrose 50% Injectable 12.5 Gram(s) IV Push once  dextrose 50% Injectable 25 Gram(s) IV Push once  dextrose 50% Injectable 25 Gram(s) IV Push once  DOBUTamine Infusion 7.5 MICROgram(s)/kG/Min (16.942 mL/Hr) IV Continuous <Continuous>  docusate sodium 100 milliGRAM(s) Oral two times a day  finasteride 5 milliGRAM(s) Oral daily  influenza   Vaccine 0.5 milliLiter(s) IntraMuscular once  insulin glargine Injectable (LANTUS) 20 Unit(s) SubCutaneous at bedtime  insulin lispro (HumaLOG) corrective regimen sliding scale   SubCutaneous three times a day before meals  insulin lispro (HumaLOG) corrective regimen sliding scale   SubCutaneous at bedtime  insulin lispro Injectable (HumaLOG) 8 Unit(s) SubCutaneous three times a day before meals  levothyroxine 75 MICROGram(s) Oral daily  midodrine 30 milliGRAM(s) Oral three times a day  MIRACLE MOUTHWASH 30 milliLiter(s) Swish and Spit three times a day  Nephro-lynda 1 Tablet(s) Oral daily  polyethylene glycol 3350 17 Gram(s) Oral daily  predniSONE   Tablet   Oral   predniSONE   Tablet 5 milliGRAM(s) Oral daily  Saliva Substitute (CAPHOSOL) 30 milliLiter(s) Swish and Spit every 6 hours  senna 2 Tablet(s) Oral at bedtime    MEDICATIONS  (PRN):  benzocaine 15 mG/menthol 3.6 mG Lozenge 1 Lozenge Oral every 6 hours PRN Sore Throat  dextrose Gel 1 Dose(s) Oral once PRN Blood Glucose LESS THAN 70 milliGRAM(s)/deciLiter  dextrose Gel 1 Dose(s) Oral once PRN Blood Glucose LESS THAN 70 milliGRAM(s)/deciliter  glucagon  Injectable 1 milliGRAM(s) IntraMuscular once PRN Glucose <70 milliGRAM(s)/deciLiter  guaiFENesin    Syrup 100 milliGRAM(s) Oral every 6 hours PRN Cough  guaiFENesin   Syrup  (Sugar-Free) 200 milliGRAM(s) Oral every 6 hours PRN Cough  sodium chloride 0.65% Nasal 1 Spray(s) Both Nostrils every 2 hours PRN Nasal Congestion      Vital Signs Last 24 Hrs  T(C): 36.4 (14 Mar 2018 11:15), Max: 36.7 (14 Mar 2018 02:11)  T(F): 97.5 (14 Mar 2018 11:15), Max: 98 (14 Mar 2018 02:11)  HR: 75 (14 Mar 2018 11:15) (75 - 93)  BP: 114/58 (14 Mar 2018 11:15) (94/57 - 115/73)  BP(mean): --  RR: 18 (14 Mar 2018 11:15) (17 - 18)  SpO2: 95% (14 Mar 2018 09:50) (92% - 99%)  CAPILLARY BLOOD GLUCOSE      POCT Blood Glucose.: 252 mg/dL (14 Mar 2018 12:25)  POCT Blood Glucose.: 262 mg/dL (14 Mar 2018 09:12)  POCT Blood Glucose.: 100 mg/dL (13 Mar 2018 21:43)  POCT Blood Glucose.: 104 mg/dL (13 Mar 2018 17:46)    I&O's Summary    13 Mar 2018 07:01  -  14 Mar 2018 07:00  --------------------------------------------------------  IN: 500 mL / OUT: 3000 mL / NET: -2500 mL      PHYSICAL EXAM  GENERAL: Mild distress 2/2 SOB, well-developed  HEAD:  Atraumatic, Normocephalic  EYES: EOMI, PERRLA, conjunctiva and sclera clear  NECK: Supple, No JVD  CHEST/LUNG: Mild crackles in b/l bases, no wheezing   HEART: Regular rate and rhythm; No murmurs, rubs, or gallops  ABDOMEN: Soft, Nontender, Nondistended; Bowel sounds present  EXTREMITIES:  3+ pitting edema in b/l LE   PSYCH: AAOx3  SKIN: No rashes or lesions    LABS:                        11.4   10.06 )-----------( 114      ( 14 Mar 2018 05:35 )             35.0     03-14    134<L>  |  92<L>  |  57<H>  ----------------------------<  102<H>  4.1   |  25  |  4.49<H>    Ca    8.5      14 Mar 2018 05:35  Phos  4.4     03-14  Mg     2.1     03-14      PT/INR - ( 14 Mar 2018 05:35 )   PT: 36.9 SEC;   INR: 3.25              Consultant(s) Notes Reviewed:  Pulm, Renal     Care Discussed with Consultants/Other Providers: Heart failure team

## 2018-03-14 NOTE — PROGRESS NOTE ADULT - PROBLEM SELECTOR PLAN 1
had puf 3/12, HD 3/13 and Puf today, 2 kg removed, uneventful  still overhydrated, plan for hd tomorrow   c/w midodrine  c/w renal diet, fluid restriction 1.2L/day

## 2018-03-14 NOTE — PROGRESS NOTE ADULT - SUBJECTIVE AND OBJECTIVE BOX
Pt seen and examined at bedside    s/p PUF earlier today, tolerated well  dyspnea better after dialysis  dyspnea at base-line, with nasal O2  no chest pain,dizziness  no  nausea,vomiting      Allergies:  No Known Allergies    Hospital Medications:   MEDICATIONS  (STANDING):  ALBUTerol/ipratropium for Nebulization 3 milliLiter(s) Nebulizer every 6 hours  amiodarone    Tablet 100 milliGRAM(s) Oral daily  aspirin enteric coated 81 milliGRAM(s) Oral daily  atorvastatin 80 milliGRAM(s) Oral at bedtime  buDESOnide   90 MICROgram(s) Inhaler 2 Puff(s) Inhalation two times a day  dextrose 5%. 1000 milliLiter(s) (50 mL/Hr) IV Continuous <Continuous>  dextrose 5%. 1000 milliLiter(s) (50 mL/Hr) IV Continuous <Continuous>  dextrose 50% Injectable 12.5 Gram(s) IV Push once  dextrose 50% Injectable 25 Gram(s) IV Push once  dextrose 50% Injectable 25 Gram(s) IV Push once  DOBUTamine Infusion 7.5 MICROgram(s)/kG/Min (16.942 mL/Hr) IV Continuous <Continuous>  docusate sodium 100 milliGRAM(s) Oral two times a day  finasteride 5 milliGRAM(s) Oral daily  influenza   Vaccine 0.5 milliLiter(s) IntraMuscular once  insulin glargine Injectable (LANTUS) 20 Unit(s) SubCutaneous at bedtime  insulin lispro (HumaLOG) corrective regimen sliding scale   SubCutaneous three times a day before meals  insulin lispro (HumaLOG) corrective regimen sliding scale   SubCutaneous at bedtime  insulin lispro Injectable (HumaLOG) 8 Unit(s) SubCutaneous three times a day before meals  levothyroxine 75 MICROGram(s) Oral daily  midodrine 30 milliGRAM(s) Oral three times a day  MIRACLE MOUTHWASH 30 milliLiter(s) Swish and Spit three times a day  Nephro-lynda 1 Tablet(s) Oral daily  polyethylene glycol 3350 17 Gram(s) Oral daily  predniSONE   Tablet   Oral   predniSONE   Tablet 5 milliGRAM(s) Oral daily  Saliva Substitute (CAPHOSOL) 30 milliLiter(s) Swish and Spit every 6 hours  senna 2 Tablet(s) Oral at bedtime         VITALS:  Vital Signs Last 24 Hrs  T(C): 36.5 (14 Mar 2018 15:32), Max: 36.7 (14 Mar 2018 02:11)  T(F): 97.7 (14 Mar 2018 15:32), Max: 98 (14 Mar 2018 02:11)  HR: 77 (14 Mar 2018 15:32) (75 - 93)  BP: 101/61 (14 Mar 2018 15:32) (94/57 - 119/63)  BP(mean): --  RR: 17 (14 Mar 2018 15:32) (16 - 18)  SpO2: 97% (14 Mar 2018 15:32) (92% - 97%)    I&O's Summary    13 Mar 2018 07:01  -  14 Mar 2018 07:00  --------------------------------------------------------  IN: 500 mL / OUT: 3000 mL / NET: -2500 mL    14 Mar 2018 07:01  -  14 Mar 2018 18:21  --------------------------------------------------------  IN: 400 mL / OUT: 2400 mL / NET: -2000 mL    PHYSICAL EXAM:  Constitutional: NAD, on nasal O2., sitting comfortably in bed  HEENT: anicteric sclera, oropharynx clear, MMM  Neck: No JVD  Respiratory: bibasilar crepts, lt > rt  Cardiovascular: S1, S2, irreg  Gastrointestinal: BS+, soft, NT/ND  Extremities: No cyanosis or clubbing. 3+ leg edema  Neurological: A/O x 3, no focal deficits  Psychiatric: Normal mood, normal affect  : No CVA tenderness. No rosa.   Skin: No rashes  Vascular Access: IJ PC+    LABS:  03-14    134<L>  |  92<L>  |  57<H>  ----------------------------<  102<H>  4.1   |  25  |  4.49<H>    Ca    8.5      14 Mar 2018 05:35  Phos  4.4     03-14  Mg     2.1     03-14                          11.4   10.06 )-----------( 114      ( 14 Mar 2018 05:35 )             35.0       Urine Studies:      RADIOLOGY & ADDITIONAL STUDIES:

## 2018-03-14 NOTE — PROGRESS NOTE ADULT - PROBLEM SELECTOR PLAN 4
hd as per renal  keep net negative as able  3/1: on HD  3/4: on HD  3/9: cont HD per renal  3/11: on HD  3/12: on HD  3/14: Cont HD

## 2018-03-14 NOTE — PROGRESS NOTE ADULT - PROBLEM SELECTOR PLAN 1
- Multifactorial due to COPD, pulmonary fibrosis and end stage heart failure.  No additional means of therapy will reverse his chronic illnesses at this point.  - Supplemental oxygen to maintain O2 sat >88% on PO steroids (on taper).   - BiPAP QHS

## 2018-03-14 NOTE — PROGRESS NOTE ADULT - PROBLEM SELECTOR PLAN 5
HR controlled  ac as per cards/medicine  management as per cards  3/1: INR is therapeutic:  3/3: INR is therapeutic  3/5: INR therapeutic::  3/9: INR is therapeutic:  3/11: INR is low:  3/12: 1.70  3/13: HR is controlled:: On AC per primary/Cardiology  3/14: HR seems to be controlled: on AC

## 2018-03-14 NOTE — PROGRESS NOTE ADULT - PROBLEM SELECTOR PLAN 2
: supportive treatment:  titrate oxygen to keep o2 sat>=90%  2/26 began trial prednisone for sob-prednisone 20 mg po daily  2/28: started on a trial of prednisone two days ago, will monitor, however per his family, he never had a good response to previous trial of steroid.  3/1: on retrial of steroids: Pt thinks his breathing is better then before: Would like to cont steroids at this t alirio  3/2: cont steroids: pt seems to have stable SOB , does not seem to be improving further to me:  3/3: decrease steroids to 15 mg ad ay  3/4: on 15 mg today , decrease to 10 mg a day  3/5: taper off prednisone: already ordered  3*6: cont steroids  3/7: doing pretty poor: cont steroids trial for some time  3/8: on steroids at tis time: low dose steroids  3/9: It is difficult to discern if steroids are helpful in him: However the pt as well as the sister insists that we cont to try steroids: They think he feels a little better with it: Side effects have been explained to them:  3/10: cont 10 mg today too ? decrease to 5 mg soon  3/11: on steroids:  3/12: pt is on 5 mg of steroids: will taper  to off on next few days:  3/13: pt has been doing poorly: cont prednisone till 16 of March.  3/14: don't think steroids are helping much : will finish soon:

## 2018-03-14 NOTE — PROGRESS NOTE ADULT - PROBLEM SELECTOR PLAN 2
- Pt remains severely volume overloaded and there is no end point to adequate removal of his volume.  We have extensively discussed this with pt and he wants to stay in the hospital as long as it takes.   - On dobutamine infusion - HD/UF per renal.  Discussed with renal (Dr. Ridley), who has attempted additional sessions of UF/HD as outpatient, but pt ultimately bounces back to hospital.    - As per cardio, no indication for TTE with bubble for R to L shunt eval because patient is not candidate for possible PFO repair at this time.  - Pt unable to take BB or ACE/ARB as he is chronically hypotensive on Midodrine  - Inpatient HF team consulted for 2nd opinion.  Pt is not a candidate for any long term HF treatment.

## 2018-03-14 NOTE — PROGRESS NOTE ADULT - SUBJECTIVE AND OBJECTIVE BOX
Patient is a 62y old  Male who presents with a chief complaint of SOB x few hours (20 Feb 2018 18:05)      Any change in ROS: Doing ok   SOB +  for dialysis today     MEDICATIONS  (STANDING):  ALBUTerol/ipratropium for Nebulization 3 milliLiter(s) Nebulizer every 6 hours  amiodarone    Tablet 100 milliGRAM(s) Oral daily  aspirin enteric coated 81 milliGRAM(s) Oral daily  atorvastatin 80 milliGRAM(s) Oral at bedtime  buDESOnide   90 MICROgram(s) Inhaler 2 Puff(s) Inhalation two times a day  dextrose 5%. 1000 milliLiter(s) (50 mL/Hr) IV Continuous <Continuous>  dextrose 5%. 1000 milliLiter(s) (50 mL/Hr) IV Continuous <Continuous>  dextrose 50% Injectable 12.5 Gram(s) IV Push once  dextrose 50% Injectable 25 Gram(s) IV Push once  dextrose 50% Injectable 25 Gram(s) IV Push once  DOBUTamine Infusion 7.5 MICROgram(s)/kG/Min (16.942 mL/Hr) IV Continuous <Continuous>  docusate sodium 100 milliGRAM(s) Oral two times a day  finasteride 5 milliGRAM(s) Oral daily  influenza   Vaccine 0.5 milliLiter(s) IntraMuscular once  insulin glargine Injectable (LANTUS) 20 Unit(s) SubCutaneous at bedtime  insulin lispro (HumaLOG) corrective regimen sliding scale   SubCutaneous three times a day before meals  insulin lispro (HumaLOG) corrective regimen sliding scale   SubCutaneous at bedtime  insulin lispro Injectable (HumaLOG) 8 Unit(s) SubCutaneous three times a day before meals  levothyroxine 75 MICROGram(s) Oral daily  midodrine 30 milliGRAM(s) Oral three times a day  MIRACLE MOUTHWASH 30 milliLiter(s) Swish and Spit three times a day  Nephro-lynda 1 Tablet(s) Oral daily  polyethylene glycol 3350 17 Gram(s) Oral daily  predniSONE   Tablet   Oral   predniSONE   Tablet 5 milliGRAM(s) Oral daily  Saliva Substitute (CAPHOSOL) 30 milliLiter(s) Swish and Spit every 6 hours  senna 2 Tablet(s) Oral at bedtime    MEDICATIONS  (PRN):  benzocaine 15 mG/menthol 3.6 mG Lozenge 1 Lozenge Oral every 6 hours PRN Sore Throat  dextrose Gel 1 Dose(s) Oral once PRN Blood Glucose LESS THAN 70 milliGRAM(s)/deciLiter  dextrose Gel 1 Dose(s) Oral once PRN Blood Glucose LESS THAN 70 milliGRAM(s)/deciliter  glucagon  Injectable 1 milliGRAM(s) IntraMuscular once PRN Glucose <70 milliGRAM(s)/deciLiter  guaiFENesin    Syrup 100 milliGRAM(s) Oral every 6 hours PRN Cough  guaiFENesin   Syrup  (Sugar-Free) 200 milliGRAM(s) Oral every 6 hours PRN Cough  sodium chloride 0.65% Nasal 1 Spray(s) Both Nostrils every 2 hours PRN Nasal Congestion    Vital Signs Last 24 Hrs  T(C): 36.6 (14 Mar 2018 09:50), Max: 36.7 (14 Mar 2018 02:11)  T(F): 97.8 (14 Mar 2018 09:50), Max: 98 (14 Mar 2018 02:11)  HR: 93 (14 Mar 2018 09:50) (75 - 93)  BP: 115/73 (14 Mar 2018 09:50) (94/57 - 115/73)  BP(mean): --  RR: 17 (14 Mar 2018 09:50) (17 - 18)  SpO2: 95% (14 Mar 2018 09:50) (92% - 99%)    I&O's Summary    13 Mar 2018 07:01  -  14 Mar 2018 07:00  --------------------------------------------------------  IN: 500 mL / OUT: 3000 mL / NET: -2500 mL          Physical Exam:   GENERAL: NAD, well-groomed, well-developed  HEENT: BELL/   Atraumatic, Normocephalic  ENMT: No tonsillar erythema, exudates, or enlargement; Moist mucous membranes, Good dentition, No lesions  NECK: Supple, No JVD, Normal thyroid  CHEST/LUNG: Clear to auscultaion, ; No rales, rhonchi, wheezing, or rubs  CVS: Regular rate and rhythm; No murmurs, rubs, or gallops  GI: : Soft, Nontender, Nondistended; Bowel sounds present  NERVOUS SYSTEM:  Alert & Oriented X3  EXTREMITIES:  3+ edema  LYMPH: No lymphadenopathy noted  SKIN: No rashes or lesions  ENDOCRINOLOGY: No Thyromegaly  PSYCH: Appropriate    Labs:                              11.4   10.06 )-----------( 114      ( 14 Mar 2018 05:35 )             35.0                         11.2   15.86 )-----------( 123      ( 13 Mar 2018 05:15 )             34.8                         12.2   17.28 )-----------( 129      ( 12 Mar 2018 06:21 )             38.4                         11.4   11.64 )-----------( 130      ( 11 Mar 2018 06:15 )             35.5     03-14    134<L>  |  92<L>  |  57<H>  ----------------------------<  102<H>  4.1   |  25  |  4.49<H>  03-13    132<L>  |  89<L>  |  87<H>  ----------------------------<  198<H>  4.4   |  23  |  6.38<H>  03-12    135  |  91<L>  |  73<H>  ----------------------------<  85  4.4   |  24  |  5.81<H>  03-11    135  |  94<L>  |  48<H>  ----------------------------<  158<H>  3.6   |  24  |  4.32<H>    Ca    8.5      14 Mar 2018 05:35  Ca    9.0      13 Mar 2018 05:15  Phos  4.4     03-14  Mg     2.1     03-14  Mg     2.1     03-13      CAPILLARY BLOOD GLUCOSE      POCT Blood Glucose.: 262 mg/dL (14 Mar 2018 09:12)  POCT Blood Glucose.: 100 mg/dL (13 Mar 2018 21:43)  POCT Blood Glucose.: 104 mg/dL (13 Mar 2018 17:46)  POCT Blood Glucose.: 280 mg/dL (13 Mar 2018 13:13)        PT/INR - ( 14 Mar 2018 05:35 )   PT: 36.9 SEC;   INR: 3.25              Cultures:         < from: Xray Chest 1 View- PORTABLE-Urgent (02.26.18 @ 11:27) >  opathy.    TECHNIQUE: AP chest radiograph    COMPARISON: Chest radiograph performed 2/17/2018. CT abdomen and pelvis   performed 10/30/2017.    FINDINGS:    A right-sided dialysis catheter terminates over SVC. There is a   left-sided cardiac device. There is redemonstration of diffuse   interstitial and reticular opacities bilaterally, mildly improved since   2/17/2018. There are small bilateral pleural effusions.    IMPRESSION:    Persistent diffuse interstitial and reticular lung opacities bilaterally,   mildly improved since 2/17/2018, which may represent pulmonary edema   superimposed on patient's known interstitial lung disease.    Small bilateral pleural effusions.              ANNIA MURPHY M.D., RADIOLOGY RESIDENT  This document has been electronically signed.  NITIN MAHARAJ M.D. ATTENDING RADIOLOGIST  This document has been electronically signed. Feb 26 2018  2:54PM        < end of copied text >                      Studies  Chest X-RAY  CT SCAN Chest   Venous Dopplers: LE:   CT Abdomen  Others

## 2018-03-15 LAB
APTT BLD: 46.2 SEC — HIGH (ref 27.5–37.4)
BUN SERPL-MCNC: 71 MG/DL — HIGH (ref 7–23)
CALCIUM SERPL-MCNC: 8.6 MG/DL — SIGNIFICANT CHANGE UP (ref 8.4–10.5)
CHLORIDE SERPL-SCNC: 91 MMOL/L — LOW (ref 98–107)
CO2 SERPL-SCNC: 22 MMOL/L — SIGNIFICANT CHANGE UP (ref 22–31)
CREAT SERPL-MCNC: 5.6 MG/DL — HIGH (ref 0.5–1.3)
GLUCOSE SERPL-MCNC: 215 MG/DL — HIGH (ref 70–99)
HCT VFR BLD CALC: 34.7 % — LOW (ref 39–50)
HGB BLD-MCNC: 10.7 G/DL — LOW (ref 13–17)
INR BLD: 3.25 — HIGH (ref 0.88–1.17)
MAGNESIUM SERPL-MCNC: 1.9 MG/DL — SIGNIFICANT CHANGE UP (ref 1.6–2.6)
MCHC RBC-ENTMCNC: 30.2 PG — SIGNIFICANT CHANGE UP (ref 27–34)
MCHC RBC-ENTMCNC: 30.8 % — LOW (ref 32–36)
MCV RBC AUTO: 98 FL — SIGNIFICANT CHANGE UP (ref 80–100)
NRBC # FLD: 0 — SIGNIFICANT CHANGE UP
PLATELET # BLD AUTO: 110 K/UL — LOW (ref 150–400)
PMV BLD: 13.9 FL — HIGH (ref 7–13)
POTASSIUM SERPL-MCNC: 4.3 MMOL/L — SIGNIFICANT CHANGE UP (ref 3.5–5.3)
POTASSIUM SERPL-SCNC: 4.3 MMOL/L — SIGNIFICANT CHANGE UP (ref 3.5–5.3)
PROTHROM AB SERPL-ACNC: 38.3 SEC — HIGH (ref 9.8–13.1)
RBC # BLD: 3.54 M/UL — LOW (ref 4.2–5.8)
RBC # FLD: 18.7 % — HIGH (ref 10.3–14.5)
SODIUM SERPL-SCNC: 136 MMOL/L — SIGNIFICANT CHANGE UP (ref 135–145)
WBC # BLD: 9.71 K/UL — SIGNIFICANT CHANGE UP (ref 3.8–10.5)
WBC # FLD AUTO: 9.71 K/UL — SIGNIFICANT CHANGE UP (ref 3.8–10.5)

## 2018-03-15 PROCEDURE — 99233 SBSQ HOSP IP/OBS HIGH 50: CPT

## 2018-03-15 RX ADMIN — DIPHENHYDRAMINE HYDROCHLORIDE AND LIDOCAINE HYDROCHLORIDE AND ALUMINUM HYDROXIDE AND MAGNESIUM HYDRO 30 MILLILITER(S): KIT at 13:46

## 2018-03-15 RX ADMIN — MIDODRINE HYDROCHLORIDE 30 MILLIGRAM(S): 2.5 TABLET ORAL at 23:22

## 2018-03-15 RX ADMIN — INSULIN GLARGINE 20 UNIT(S): 100 INJECTION, SOLUTION SUBCUTANEOUS at 23:25

## 2018-03-15 RX ADMIN — Medication 3 MILLILITER(S): at 21:27

## 2018-03-15 RX ADMIN — Medication 30 MILLILITER(S): at 17:21

## 2018-03-15 RX ADMIN — Medication 1 TABLET(S): at 12:27

## 2018-03-15 RX ADMIN — Medication 8 UNIT(S): at 13:45

## 2018-03-15 RX ADMIN — AMIODARONE HYDROCHLORIDE 100 MILLIGRAM(S): 400 TABLET ORAL at 10:53

## 2018-03-15 RX ADMIN — ATORVASTATIN CALCIUM 80 MILLIGRAM(S): 80 TABLET, FILM COATED ORAL at 23:22

## 2018-03-15 RX ADMIN — DIPHENHYDRAMINE HYDROCHLORIDE AND LIDOCAINE HYDROCHLORIDE AND ALUMINUM HYDROXIDE AND MAGNESIUM HYDRO 30 MILLILITER(S): KIT at 23:23

## 2018-03-15 RX ADMIN — FINASTERIDE 5 MILLIGRAM(S): 5 TABLET, FILM COATED ORAL at 12:26

## 2018-03-15 RX ADMIN — Medication 16.94 MICROGRAM(S)/KG/MIN: at 10:48

## 2018-03-15 RX ADMIN — Medication 3 MILLILITER(S): at 04:30

## 2018-03-15 RX ADMIN — Medication 3 MILLILITER(S): at 15:35

## 2018-03-15 RX ADMIN — Medication 30 MILLILITER(S): at 05:37

## 2018-03-15 RX ADMIN — Medication 2 PUFF(S): at 10:48

## 2018-03-15 RX ADMIN — Medication 1: at 13:45

## 2018-03-15 RX ADMIN — Medication 2 PUFF(S): at 23:23

## 2018-03-15 RX ADMIN — Medication 100 MILLIGRAM(S): at 17:21

## 2018-03-15 RX ADMIN — DIPHENHYDRAMINE HYDROCHLORIDE AND LIDOCAINE HYDROCHLORIDE AND ALUMINUM HYDROXIDE AND MAGNESIUM HYDRO 30 MILLILITER(S): KIT at 05:44

## 2018-03-15 RX ADMIN — Medication 75 MICROGRAM(S): at 05:38

## 2018-03-15 RX ADMIN — Medication 5 MILLIGRAM(S): at 10:54

## 2018-03-15 RX ADMIN — MIDODRINE HYDROCHLORIDE 30 MILLIGRAM(S): 2.5 TABLET ORAL at 05:37

## 2018-03-15 RX ADMIN — MIDODRINE HYDROCHLORIDE 30 MILLIGRAM(S): 2.5 TABLET ORAL at 13:46

## 2018-03-15 RX ADMIN — Medication 8 UNIT(S): at 18:15

## 2018-03-15 RX ADMIN — Medication 30 MILLILITER(S): at 23:22

## 2018-03-15 RX ADMIN — Medication 30 MILLILITER(S): at 12:27

## 2018-03-15 RX ADMIN — Medication 8 UNIT(S): at 10:47

## 2018-03-15 RX ADMIN — Medication 81 MILLIGRAM(S): at 12:28

## 2018-03-15 NOTE — PROGRESS NOTE ADULT - PROBLEM SELECTOR PLAN 2
- Pt remains severely volume overloaded and there is no end point to adequate removal of his volume.  We have extensively discussed this with pt and he wants to stay in the hospital as long as it takes.  Pt has been requiring almost daily HD/PUF this week, which is not feasible as an outpatient.    - On dobutamine infusion - HD/UF per renal.  Discussed with renal (Dr. Ridley), who has attempted additional sessions of UF/HD as outpatient, but pt ultimately bounces back to hospital.    - As per cardio, no indication for TTE with bubble for R to L shunt eval because patient is not candidate for possible PFO repair at this time.  - Pt unable to take BB or ACE/ARB as he is chronically hypotensive on Midodrine  - Inpatient HF team consulted for 2nd opinion.  Pt is not a candidate for any long term HF treatment, awaiting their recommendations.

## 2018-03-15 NOTE — PROGRESS NOTE ADULT - SUBJECTIVE AND OBJECTIVE BOX
Pt seen and examined at bedside  had HD  today AM, tolerated dialysis well. 2.5 litre rmoved  feels better after dialysis.  dyspnea better after dialsysis  no chest pain,dizziness  no nausea,vomiting, diarrea      Allergies:  No Known Allergies    Hospital Medications:   MEDICATIONS  (STANDING):  ALBUTerol/ipratropium for Nebulization 3 milliLiter(s) Nebulizer every 6 hours  amiodarone    Tablet 100 milliGRAM(s) Oral daily  aspirin enteric coated 81 milliGRAM(s) Oral daily  atorvastatin 80 milliGRAM(s) Oral at bedtime  buDESOnide   90 MICROgram(s) Inhaler 2 Puff(s) Inhalation two times a day  dextrose 5%. 1000 milliLiter(s) (50 mL/Hr) IV Continuous <Continuous>  dextrose 5%. 1000 milliLiter(s) (50 mL/Hr) IV Continuous <Continuous>  dextrose 50% Injectable 12.5 Gram(s) IV Push once  dextrose 50% Injectable 25 Gram(s) IV Push once  dextrose 50% Injectable 25 Gram(s) IV Push once  DOBUTamine Infusion 7.5 MICROgram(s)/kG/Min (16.942 mL/Hr) IV Continuous <Continuous>  docusate sodium 100 milliGRAM(s) Oral two times a day  finasteride 5 milliGRAM(s) Oral daily  influenza   Vaccine 0.5 milliLiter(s) IntraMuscular once  insulin glargine Injectable (LANTUS) 20 Unit(s) SubCutaneous at bedtime  insulin lispro (HumaLOG) corrective regimen sliding scale   SubCutaneous three times a day before meals  insulin lispro (HumaLOG) corrective regimen sliding scale   SubCutaneous at bedtime  insulin lispro Injectable (HumaLOG) 8 Unit(s) SubCutaneous three times a day before meals  levothyroxine 75 MICROGram(s) Oral daily  midodrine 30 milliGRAM(s) Oral three times a day  MIRACLE MOUTHWASH 30 milliLiter(s) Swish and Spit three times a day  Nephro-lynda 1 Tablet(s) Oral daily  polyethylene glycol 3350 17 Gram(s) Oral daily  Saliva Substitute (CAPHOSOL) 30 milliLiter(s) Swish and Spit every 6 hours  senna 2 Tablet(s) Oral at bedtime         VITALS:  T(F): 97.4 (03-15-18 @ 10:50), Max: 97.7 (03-14-18 @ 14:00)  HR: 80 (03-15-18 @ 10:50)  BP: 106/69 (03-15-18 @ 10:50)  RR: 18 (03-15-18 @ 10:50)  SpO2: 96% (03-15-18 @ 10:50)  Wt(kg): --    03-14 @ 07:01  -  03-15 @ 07:00  --------------------------------------------------------  IN: 400 mL / OUT: 2400 mL / NET: -2000 mL        PHYSICAL EXAM:  Constitutional: NAD, sitting comfortably in bed, with nasal O2  HEENT: anicteric sclera, oropharynx clear, MMM  Neck: No JVD  Respiratory: scattered crepts, rt > lt,, better than previously  Cardiovascular: S1, S2, irreg  Gastrointestinal: BS+, soft, NT/ND  Extremities: No cyanosis or clubbing. chronic leg edema  Neurological: A/O x 3, no focal deficits  Psychiatric: Normal mood, normal affect  : No CVA tenderness. No rosa.   Skin: No rashes  Vascular Access:     LABS:  03-15    136  |  91<L>  |  71<H>  ----------------------------<  215<H>  4.3   |  22  |  5.60<H>    Ca    8.6      15 Mar 2018 06:30  Phos  4.4     03-14  Mg     1.9     03-15      Creatinine Trend: 5.60 <--, 4.49 <--, 6.38 <--, 5.81 <--, 4.32 <--, 6.03 <--, 4.62 <--                        10.7   9.71  )-----------( 110      ( 15 Mar 2018 06:20 )             34.7     Urine Studies:      RADIOLOGY & ADDITIONAL STUDIES:

## 2018-03-15 NOTE — PROGRESS NOTE ADULT - SUBJECTIVE AND OBJECTIVE BOX
Patient is a 62y old  Male who presents with a chief complaint of SOB x few hours (20 Feb 2018 18:05)      Any change in ROS: pt is doing ok   SOB off and  on :      MEDICATIONS  (STANDING):  ALBUTerol/ipratropium for Nebulization 3 milliLiter(s) Nebulizer every 6 hours  amiodarone    Tablet 100 milliGRAM(s) Oral daily  aspirin enteric coated 81 milliGRAM(s) Oral daily  atorvastatin 80 milliGRAM(s) Oral at bedtime  buDESOnide   90 MICROgram(s) Inhaler 2 Puff(s) Inhalation two times a day  dextrose 5%. 1000 milliLiter(s) (50 mL/Hr) IV Continuous <Continuous>  dextrose 5%. 1000 milliLiter(s) (50 mL/Hr) IV Continuous <Continuous>  dextrose 50% Injectable 12.5 Gram(s) IV Push once  dextrose 50% Injectable 25 Gram(s) IV Push once  dextrose 50% Injectable 25 Gram(s) IV Push once  DOBUTamine Infusion 7.5 MICROgram(s)/kG/Min (16.942 mL/Hr) IV Continuous <Continuous>  docusate sodium 100 milliGRAM(s) Oral two times a day  finasteride 5 milliGRAM(s) Oral daily  influenza   Vaccine 0.5 milliLiter(s) IntraMuscular once  insulin glargine Injectable (LANTUS) 20 Unit(s) SubCutaneous at bedtime  insulin lispro (HumaLOG) corrective regimen sliding scale   SubCutaneous three times a day before meals  insulin lispro (HumaLOG) corrective regimen sliding scale   SubCutaneous at bedtime  insulin lispro Injectable (HumaLOG) 8 Unit(s) SubCutaneous three times a day before meals  levothyroxine 75 MICROGram(s) Oral daily  midodrine 30 milliGRAM(s) Oral three times a day  MIRACLE MOUTHWASH 30 milliLiter(s) Swish and Spit three times a day  Nephro-lynda 1 Tablet(s) Oral daily  polyethylene glycol 3350 17 Gram(s) Oral daily  Saliva Substitute (CAPHOSOL) 30 milliLiter(s) Swish and Spit every 6 hours  senna 2 Tablet(s) Oral at bedtime    MEDICATIONS  (PRN):  benzocaine 15 mG/menthol 3.6 mG Lozenge 1 Lozenge Oral every 6 hours PRN Sore Throat  dextrose Gel 1 Dose(s) Oral once PRN Blood Glucose LESS THAN 70 milliGRAM(s)/deciLiter  dextrose Gel 1 Dose(s) Oral once PRN Blood Glucose LESS THAN 70 milliGRAM(s)/deciliter  glucagon  Injectable 1 milliGRAM(s) IntraMuscular once PRN Glucose <70 milliGRAM(s)/deciLiter  guaiFENesin    Syrup 100 milliGRAM(s) Oral every 6 hours PRN Cough  guaiFENesin   Syrup  (Sugar-Free) 200 milliGRAM(s) Oral every 6 hours PRN Cough  sodium chloride 0.65% Nasal 1 Spray(s) Both Nostrils every 2 hours PRN Nasal Congestion    Vital Signs Last 24 Hrs  T(C): 36.3 (15 Mar 2018 10:50), Max: 36.6 (15 Mar 2018 10:25)  T(F): 97.4 (15 Mar 2018 10:50), Max: 97.9 (15 Mar 2018 10:25)  HR: 84 (15 Mar 2018 15:35) (73 - 90)  BP: 107/82 (15 Mar 2018 13:47) (94/64 - 109/90)  BP(mean): --  RR: 17 (15 Mar 2018 13:47) (16 - 18)  SpO2: 96% (15 Mar 2018 13:47) (90% - 98%)    I&O's Summary    14 Mar 2018 07:01  -  15 Mar 2018 07:00  --------------------------------------------------------  IN: 400 mL / OUT: 2400 mL / NET: -2000 mL    15 Mar 2018 07:01  -  15 Mar 2018 16:54  --------------------------------------------------------  IN: 1150 mL / OUT: 3300 mL / NET: -2150 mL          Physical Exam:   GENERAL: NAD, well-groomed, well-developed  HEENT: BELL/   Atraumatic, Normocephalic  ENMT: No tonsillar erythema, exudates, or enlargement; Moist mucous membranes, Good dentition, No lesions  NECK: Supple, No JVD, Normal thyroid  CHEST/LUNG: scattered crackles  CVS: Regular rate and rhythm; No murmurs, rubs, or gallops  GI: : Soft, Nontender, Nondistended; Bowel sounds present  NERVOUS SYSTEM:  Alert & Oriented X3, Good concentration; Motor Strength 5/5 B/L upper and lower extremities; DTRs 2+ intact and symmetric  EXTREMITIES:  2+ edema  LYMPH: No lymphadenopathy noted  SKIN: No rashes or lesions  ENDOCRINOLOGY: No Thyromegaly  PSYCH: Appropriate    Labs:                              10.7   9.71  )-----------( 110      ( 15 Mar 2018 06:20 )             34.7                         11.4   10.06 )-----------( 114      ( 14 Mar 2018 05:35 )             35.0                         11.2   15.86 )-----------( 123      ( 13 Mar 2018 05:15 )             34.8                         12.2   17.28 )-----------( 129      ( 12 Mar 2018 06:21 )             38.4     03-15    136  |  91<L>  |  71<H>  ----------------------------<  215<H>  4.3   |  22  |  5.60<H>  03-14    134<L>  |  92<L>  |  57<H>  ----------------------------<  102<H>  4.1   |  25  |  4.49<H>  03-13    132<L>  |  89<L>  |  87<H>  ----------------------------<  198<H>  4.4   |  23  |  6.38<H>  03-12    135  |  91<L>  |  73<H>  ----------------------------<  85  4.4   |  24  |  5.81<H>    Ca    8.6      15 Mar 2018 06:30  Ca    8.5      14 Mar 2018 05:35  Phos  4.4     03-14  Mg     1.9     03-15  Mg     2.1     03-14      CAPILLARY BLOOD GLUCOSE      POCT Blood Glucose.: 186 mg/dL (15 Mar 2018 12:58)  POCT Blood Glucose.: 168 mg/dL (15 Mar 2018 10:44)  POCT Blood Glucose.: 136 mg/dL (15 Mar 2018 08:56)  POCT Blood Glucose.: 142 mg/dL (15 Mar 2018 05:35)  POCT Blood Glucose.: 128 mg/dL (14 Mar 2018 21:33)  POCT Blood Glucose.: 235 mg/dL (14 Mar 2018 17:32)        PT/INR - ( 15 Mar 2018 06:20 )   PT: 38.3 SEC;   INR: 3.25          PTT - ( 15 Mar 2018 06:20 )  PTT:46.2 SEC    Cultures:                 < from: Xray Chest 1 View- PORTABLE-Urgent (02.26.18 @ 11:27) >    PROCEDURE DATE:  Feb 26 2018         INTERPRETATION:  CLINICAL INFORMATION: Shortness of breath.   Cardiomyopathy.    TECHNIQUE: AP chest radiograph    COMPARISON: Chest radiograph performed 2/17/2018. CT abdomen and pelvis   performed 10/30/2017.    FINDINGS:    A right-sided dialysis catheter terminates over SVC. There is a   left-sided cardiac device. There is redemonstration of diffuse   interstitial and reticular opacities bilaterally, mildly improved since   2/17/2018. There are small bilateral pleural effusions.    IMPRESSION:    Persistent diffuse interstitial and reticular lung opacities bilaterally,   mildly improved since 2/17/2018, which may represent pulmonary edema   superimposed on patient's known interstitial lung disease.    Small bilateral pleural effusions.              ANNIA MURPHY M.D., RADIOLOGY RESIDENT  This document has been electronically signed.  NITIN MAHARAJ M.D. ATTENDING RADIOLOGIST  This document has been electronically signed. Feb 26 2018  2:54PM    < end of copied text >              Studies  Chest X-RAY  CT SCAN Chest   Venous Dopplers: LE:   CT Abdomen  Others

## 2018-03-15 NOTE — PROGRESS NOTE ADULT - PROBLEM SELECTOR PLAN 2
: supportive treatment:  titrate oxygen to keep o2 sat>=90%  2/26 began trial prednisone for sob-prednisone 20 mg po daily  2/28: started on a trial of prednisone two days ago, will monitor, however per his family, he never had a good response to previous trial of steroid.  3/1: on retrial of steroids: Pt thinks his breathing is better then before: Would like to cont steroids at this t alirio  3/2: cont steroids: pt seems to have stable SOB , does not seem to be improving further to me:  3/3: decrease steroids to 15 mg ad ay  3/4: on 15 mg today , decrease to 10 mg a day  3/5: taper off prednisone: already ordered  3*6: cont steroids  3/7: doing pretty poor: cont steroids trial for some time  3/8: on steroids at tis time: low dose steroids  3/9: It is difficult to discern if steroids are helpful in him: However the pt as well as the sister insists that we cont to try steroids: They think he feels a little better with it: Side effects have been explained to them:  3/10: cont 10 mg today too ? decrease to 5 mg soon  3/11: on steroids:  3/12: pt is on 5 mg of steroids: will taper  to off on next few days:  3/13: pt has been doing poorly: cont prednisone till 16 of March.  3/14: don't think steroids are helping much : will finish soon:  3/15:has extensive fibrosis: taper steroids to off tomorrow:

## 2018-03-15 NOTE — PROGRESS NOTE ADULT - PROBLEM SELECTOR PLAN 4
hd as per renal  keep net negative as able  3/1: on HD  3/4: on HD  3/9: cont HD per renal  3/11: on HD  3/12: on HD  3/14: Cont HD  3/15: On HD

## 2018-03-15 NOTE — PROGRESS NOTE ADULT - PROBLEM SELECTOR PLAN 5
HR controlled  ac as per cards/medicine  management as per cards  3/1: INR is therapeutic:  3/3: INR is therapeutic  3/5: INR therapeutic::  3/9: INR is therapeutic:  3/11: INR is low:  3/12: 1.70  3/13: HR is controlled:: On AC per primary/Cardiology  3/14: HR seems to be controlled: on AC  3/15: Cont AC

## 2018-03-15 NOTE — PROGRESS NOTE ADULT - PROBLEM SELECTOR PLAN 1
- Multifactorial due to COPD, pulmonary fibrosis and end stage heart failure.  No additional means of therapy will reverse his chronic illnesses at this point.  - Supplemental oxygen to maintain O2 sat >88% on PO steroids (on taper to end 3/16)   - BiPAP QHS

## 2018-03-15 NOTE — PROGRESS NOTE ADULT - PROBLEM SELECTOR PLAN 6
monitor blood glucose: defer to primary care:  3/1: blood glucose is  uncontrolled: ? secondary to steroids: would defer to endo  3/2: still uncontrolled: defer to primary care:  3/6: Blood glucose in 200-300's: Defer to primary  3/9: Reasonable  3/13: Blood glucose controlled  3/15: blood glucose controlled

## 2018-03-15 NOTE — PROGRESS NOTE ADULT - PROBLEM SELECTOR PLAN 1
has underlying pulmonary fibrosis and respiratory failure precipitated by influenza:   Titrate fio2 to keep o2 sat >=90% remains on 50% VM  NIV QHS/PRV  2/28: cont NIV at night and daytime too  3/1: says his breathing is better: would cont to use steroids as well as bipap at night time: Cont oxygen  to keep sao2 above 88%:  3/2: doing reasonable: cont current care:  3/3: decrease  steroids to 15 mg a day  3/4: given risk of increasing retention of salt and water , and he has been on dobutamine: would cut it down to 10 mg tomorrow and slowly taper it to off  3/5: It is very hard to determine whether steroids are beneficially improving his SOB: I doubt that steroids are of much help here: They may also potentiate salt and water retention! Will taper them off!  3/6: today sister says she would like to continue steroids for sometime : would continue for now and taper slowly: May be send home on 10 mg of steroids:  3/7: doing ok : cont current care: Has CMP and is on Dobutamine Drip  3/8: on dobutamine  3/9: cont dobutamine and night time BiPAP  3/10: cont current treatment : breathing wise he remains tenuous:  3/11: on dobutamine:  3/12: o n dobutamine:  3/13: to cont dobutamine: Pt is on night time bipap: But he has not used it in last few days but would like to have one at home upon dc: May arrange for it : Pt encouraged to use the bipap daily:  3/14: doing ok : cont current treatment: finish steroids on 16th  3/15: doing same: SOB off and on :

## 2018-03-15 NOTE — PROGRESS NOTE ADULT - SUBJECTIVE AND OBJECTIVE BOX
Patient is a 62y old  Male who presents with a chief complaint of SOB x few hours (20 Feb 2018 18:05)      SUBJECTIVE / OVERNIGHT EVENTS: Pt feels around the same.  Feels best after HD/UF, but then becomes dyspneic again.        MEDICATIONS  (STANDING):  ALBUTerol/ipratropium for Nebulization 3 milliLiter(s) Nebulizer every 6 hours  amiodarone    Tablet 100 milliGRAM(s) Oral daily  aspirin enteric coated 81 milliGRAM(s) Oral daily  atorvastatin 80 milliGRAM(s) Oral at bedtime  buDESOnide   90 MICROgram(s) Inhaler 2 Puff(s) Inhalation two times a day  dextrose 5%. 1000 milliLiter(s) (50 mL/Hr) IV Continuous <Continuous>  dextrose 5%. 1000 milliLiter(s) (50 mL/Hr) IV Continuous <Continuous>  dextrose 50% Injectable 12.5 Gram(s) IV Push once  dextrose 50% Injectable 25 Gram(s) IV Push once  dextrose 50% Injectable 25 Gram(s) IV Push once  DOBUTamine Infusion 7.5 MICROgram(s)/kG/Min (16.942 mL/Hr) IV Continuous <Continuous>  docusate sodium 100 milliGRAM(s) Oral two times a day  finasteride 5 milliGRAM(s) Oral daily  influenza   Vaccine 0.5 milliLiter(s) IntraMuscular once  insulin glargine Injectable (LANTUS) 20 Unit(s) SubCutaneous at bedtime  insulin lispro (HumaLOG) corrective regimen sliding scale   SubCutaneous three times a day before meals  insulin lispro (HumaLOG) corrective regimen sliding scale   SubCutaneous at bedtime  insulin lispro Injectable (HumaLOG) 8 Unit(s) SubCutaneous three times a day before meals  levothyroxine 75 MICROGram(s) Oral daily  midodrine 30 milliGRAM(s) Oral three times a day  MIRACLE MOUTHWASH 30 milliLiter(s) Swish and Spit three times a day  Nephro-lynda 1 Tablet(s) Oral daily  polyethylene glycol 3350 17 Gram(s) Oral daily  Saliva Substitute (CAPHOSOL) 30 milliLiter(s) Swish and Spit every 6 hours  senna 2 Tablet(s) Oral at bedtime    MEDICATIONS  (PRN):  benzocaine 15 mG/menthol 3.6 mG Lozenge 1 Lozenge Oral every 6 hours PRN Sore Throat  dextrose Gel 1 Dose(s) Oral once PRN Blood Glucose LESS THAN 70 milliGRAM(s)/deciLiter  dextrose Gel 1 Dose(s) Oral once PRN Blood Glucose LESS THAN 70 milliGRAM(s)/deciliter  glucagon  Injectable 1 milliGRAM(s) IntraMuscular once PRN Glucose <70 milliGRAM(s)/deciLiter  guaiFENesin    Syrup 100 milliGRAM(s) Oral every 6 hours PRN Cough  guaiFENesin   Syrup  (Sugar-Free) 200 milliGRAM(s) Oral every 6 hours PRN Cough  sodium chloride 0.65% Nasal 1 Spray(s) Both Nostrils every 2 hours PRN Nasal Congestion      Vital Signs Last 24 Hrs  T(C): 36.3 (15 Mar 2018 10:50), Max: 36.6 (15 Mar 2018 10:25)  T(F): 97.4 (15 Mar 2018 10:50), Max: 97.9 (15 Mar 2018 10:25)  HR: 84 (15 Mar 2018 15:35) (73 - 90)  BP: 107/82 (15 Mar 2018 13:47) (94/64 - 109/90)  BP(mean): --  RR: 17 (15 Mar 2018 13:47) (16 - 18)  SpO2: 96% (15 Mar 2018 13:47) (90% - 98%)  CAPILLARY BLOOD GLUCOSE      POCT Blood Glucose.: 186 mg/dL (15 Mar 2018 12:58)  POCT Blood Glucose.: 168 mg/dL (15 Mar 2018 10:44)  POCT Blood Glucose.: 136 mg/dL (15 Mar 2018 08:56)  POCT Blood Glucose.: 142 mg/dL (15 Mar 2018 05:35)  POCT Blood Glucose.: 128 mg/dL (14 Mar 2018 21:33)  POCT Blood Glucose.: 235 mg/dL (14 Mar 2018 17:32)    I&O's Summary    14 Mar 2018 07:01  -  15 Mar 2018 07:00  --------------------------------------------------------  IN: 400 mL / OUT: 2400 mL / NET: -2000 mL    15 Mar 2018 07:01  -  15 Mar 2018 16:43  --------------------------------------------------------  IN: 1150 mL / OUT: 3300 mL / NET: -2150 mL      PHYSICAL EXAM  GENERAL: Mild respiratory distress 2/2 SOB, well-developed  HEAD:  Atraumatic, Normocephalic  EYES: EOMI, PERRLA, conjunctiva and sclera clear  NECK: Supple, No JVD  CHEST/LUNG: Mild bibasilar crackles   HEART: Regular rate and rhythm; No murmurs, rubs, or gallops  ABDOMEN: Soft, Nontender, Nondistended; Bowel sounds present  EXTREMITIES:  Severe 3+ pitting edema in b/l LE   PSYCH: AAOx3  SKIN: No rashes or lesions    LABS:                        10.7   9.71  )-----------( 110      ( 15 Mar 2018 06:20 )             34.7     03-15    136  |  91<L>  |  71<H>  ----------------------------<  215<H>  4.3   |  22  |  5.60<H>    Ca    8.6      15 Mar 2018 06:30  Phos  4.4     03-14  Mg     1.9     03-15      PT/INR - ( 15 Mar 2018 06:20 )   PT: 38.3 SEC;   INR: 3.25          PTT - ( 15 Mar 2018 06:20 )  PTT:46.2 SEC        Consultant(s) Notes Reviewed:  Renal, Pulm

## 2018-03-15 NOTE — PROGRESS NOTE ADULT - PROBLEM SELECTOR PLAN 1
had HD today. 3,5 Litres removed  dyspnea better after dialyisis.  c/w renal diet, fluid restriction 1.2L/day

## 2018-03-15 NOTE — PROGRESS NOTE ADULT - PROBLEM SELECTOR PLAN 7
Inotropy per cardiology team.  on dobutamine and midodrine  3/1: on dobutamine  3/3: cont current care by primary team  3/4: on dobutaime:  3/5: On Midodrine and Dobutamine::  3/11: on dobutamine still  3/15: cont midodrine as wella s dobutamine:

## 2018-03-15 NOTE — PROGRESS NOTE ADULT - PROBLEM SELECTOR PLAN 3
titrate oxygen to keep o2 sat>=90%  c/w suman and pulmicort  2/26 began prednisone 20mg po daily  2/28: cont steroids to see how he responds to it in next few days  3/1: cont steroids as well as BD  3/2: cont steroids  3/3:major issue is pulmonary fibrosis: cont current care:  3/14: cont current care:  3/5: can change pulmicort to symbicort:  3/6: cont low dose steroids  3/7: on steroids  3/8: steoids taper  3/9: Cont 10 mg of steroids at this time  3/10: cont 10 mg , will change to 5 mg soon  3/11: no wheezing: today : michael change to po steroids twice day : till her MARGIE is done tomorrow:  3/12: no wheezing: cont BD : taper steroids to off  3/13: Cont Pulmicort: doubt he will be able to use Symbicort properly:  3/14: cont pulmicort as well as bronchodilators:  3/15: Cont bronchodilators:

## 2018-03-16 LAB
BUN SERPL-MCNC: 52 MG/DL — HIGH (ref 7–23)
CALCIUM SERPL-MCNC: 8.7 MG/DL — SIGNIFICANT CHANGE UP (ref 8.4–10.5)
CHLORIDE SERPL-SCNC: 96 MMOL/L — LOW (ref 98–107)
CO2 SERPL-SCNC: 24 MMOL/L — SIGNIFICANT CHANGE UP (ref 22–31)
CREAT SERPL-MCNC: 4.27 MG/DL — HIGH (ref 0.5–1.3)
GLUCOSE SERPL-MCNC: 83 MG/DL — SIGNIFICANT CHANGE UP (ref 70–99)
HCT VFR BLD CALC: 34.7 % — LOW (ref 39–50)
HGB BLD-MCNC: 11.1 G/DL — LOW (ref 13–17)
INR BLD: 2.18 — HIGH (ref 0.88–1.17)
MAGNESIUM SERPL-MCNC: 2 MG/DL — SIGNIFICANT CHANGE UP (ref 1.6–2.6)
MCHC RBC-ENTMCNC: 31.4 PG — SIGNIFICANT CHANGE UP (ref 27–34)
MCHC RBC-ENTMCNC: 32 % — SIGNIFICANT CHANGE UP (ref 32–36)
MCV RBC AUTO: 98 FL — SIGNIFICANT CHANGE UP (ref 80–100)
NRBC # FLD: 0 — SIGNIFICANT CHANGE UP
PLATELET # BLD AUTO: 106 K/UL — LOW (ref 150–400)
PMV BLD: 13.6 FL — HIGH (ref 7–13)
POTASSIUM SERPL-MCNC: 3.8 MMOL/L — SIGNIFICANT CHANGE UP (ref 3.5–5.3)
POTASSIUM SERPL-SCNC: 3.8 MMOL/L — SIGNIFICANT CHANGE UP (ref 3.5–5.3)
PROTHROM AB SERPL-ACNC: 24.6 SEC — HIGH (ref 9.8–13.1)
RBC # BLD: 3.54 M/UL — LOW (ref 4.2–5.8)
RBC # FLD: 18.6 % — HIGH (ref 10.3–14.5)
SODIUM SERPL-SCNC: 137 MMOL/L — SIGNIFICANT CHANGE UP (ref 135–145)
WBC # BLD: 9.83 K/UL — SIGNIFICANT CHANGE UP (ref 3.8–10.5)
WBC # FLD AUTO: 9.83 K/UL — SIGNIFICANT CHANGE UP (ref 3.8–10.5)

## 2018-03-16 PROCEDURE — 99233 SBSQ HOSP IP/OBS HIGH 50: CPT

## 2018-03-16 RX ORDER — WARFARIN SODIUM 2.5 MG/1
5 TABLET ORAL ONCE
Qty: 0 | Refills: 0 | Status: COMPLETED | OUTPATIENT
Start: 2018-03-16 | End: 2018-03-16

## 2018-03-16 RX ADMIN — Medication 1: at 09:23

## 2018-03-16 RX ADMIN — MIDODRINE HYDROCHLORIDE 30 MILLIGRAM(S): 2.5 TABLET ORAL at 23:41

## 2018-03-16 RX ADMIN — Medication 3 MILLILITER(S): at 04:13

## 2018-03-16 RX ADMIN — Medication 8 UNIT(S): at 09:23

## 2018-03-16 RX ADMIN — Medication 3 MILLILITER(S): at 09:55

## 2018-03-16 RX ADMIN — Medication 30 MILLILITER(S): at 23:45

## 2018-03-16 RX ADMIN — Medication 30 MILLILITER(S): at 11:49

## 2018-03-16 RX ADMIN — MIDODRINE HYDROCHLORIDE 30 MILLIGRAM(S): 2.5 TABLET ORAL at 15:21

## 2018-03-16 RX ADMIN — Medication 16.94 MICROGRAM(S)/KG/MIN: at 09:23

## 2018-03-16 RX ADMIN — Medication 100 MILLIGRAM(S): at 23:43

## 2018-03-16 RX ADMIN — Medication 16.94 MICROGRAM(S)/KG/MIN: at 23:39

## 2018-03-16 RX ADMIN — Medication 3 MILLILITER(S): at 21:57

## 2018-03-16 RX ADMIN — AMIODARONE HYDROCHLORIDE 100 MILLIGRAM(S): 400 TABLET ORAL at 06:47

## 2018-03-16 RX ADMIN — Medication 16.94 MICROGRAM(S)/KG/MIN: at 20:24

## 2018-03-16 RX ADMIN — ATORVASTATIN CALCIUM 80 MILLIGRAM(S): 80 TABLET, FILM COATED ORAL at 23:44

## 2018-03-16 RX ADMIN — Medication 2 PUFF(S): at 23:39

## 2018-03-16 RX ADMIN — DIPHENHYDRAMINE HYDROCHLORIDE AND LIDOCAINE HYDROCHLORIDE AND ALUMINUM HYDROXIDE AND MAGNESIUM HYDRO 30 MILLILITER(S): KIT at 06:48

## 2018-03-16 RX ADMIN — DIPHENHYDRAMINE HYDROCHLORIDE AND LIDOCAINE HYDROCHLORIDE AND ALUMINUM HYDROXIDE AND MAGNESIUM HYDRO 30 MILLILITER(S): KIT at 23:45

## 2018-03-16 RX ADMIN — INSULIN GLARGINE 20 UNIT(S): 100 INJECTION, SOLUTION SUBCUTANEOUS at 23:40

## 2018-03-16 RX ADMIN — MIDODRINE HYDROCHLORIDE 30 MILLIGRAM(S): 2.5 TABLET ORAL at 06:48

## 2018-03-16 RX ADMIN — Medication 30 MILLILITER(S): at 06:47

## 2018-03-16 RX ADMIN — FINASTERIDE 5 MILLIGRAM(S): 5 TABLET, FILM COATED ORAL at 11:51

## 2018-03-16 RX ADMIN — Medication 100 MILLIGRAM(S): at 06:48

## 2018-03-16 RX ADMIN — Medication 1 TABLET(S): at 11:51

## 2018-03-16 RX ADMIN — Medication 81 MILLIGRAM(S): at 11:51

## 2018-03-16 RX ADMIN — WARFARIN SODIUM 5 MILLIGRAM(S): 2.5 TABLET ORAL at 20:24

## 2018-03-16 RX ADMIN — Medication 2 PUFF(S): at 09:24

## 2018-03-16 RX ADMIN — Medication 3 MILLILITER(S): at 15:45

## 2018-03-16 RX ADMIN — DIPHENHYDRAMINE HYDROCHLORIDE AND LIDOCAINE HYDROCHLORIDE AND ALUMINUM HYDROXIDE AND MAGNESIUM HYDRO 30 MILLILITER(S): KIT at 15:22

## 2018-03-16 RX ADMIN — Medication 75 MICROGRAM(S): at 06:47

## 2018-03-16 NOTE — PROGRESS NOTE ADULT - PROBLEM SELECTOR PLAN 7
Inotropy per cardiology team.  on dobutamine and midodrine  3/1: on dobutamine  3/3: cont current care by primary team  3/4: on dobutaime:  3/5: On Midodrine and Dobutamine::  3/11: on dobutamine still  3/15: cont midodrine as wella s dobutamine:  3/16: cont current care:

## 2018-03-16 NOTE — PROGRESS NOTE ADULT - PROBLEM SELECTOR PLAN 2
- Pt remains severely volume overloaded and there is no end point to adequate removal of his volume.  We have extensively discussed this with pt and he wants to stay in the hospital as long as it takes.  Pt required daily HD/PUF this week, which is not feasible as an outpatient.    - On dobutamine infusion - HD/UF per renal.  Discussed with renal (Dr. Ridley), who has attempted additional sessions of UF/HD as outpatient, but pt ultimately bounces back to hospital.    - As per cardio, no indication for TTE with bubble for R to L shunt eval because patient is not candidate for possible PFO repair at this time.  - Pt unable to take BB or ACE/ARB as he is chronically hypotensive on Midodrine  - I spoke with HF attending Dr. De Souza, who knows pt well from previous admissions.  Unfortunately no additional therapy can be offered for the patient and he is currently receiving everything that can possibly be offered including HD and dobutamine gtt.

## 2018-03-16 NOTE — PROGRESS NOTE ADULT - PROBLEM SELECTOR PLAN 8
- Pt wishes to remain full code; states that even if he's dependent on machines, he wants to do everything to stay alive.  - Very grave prognosis, pt may decompensate this admission.  Family fully aware of pt's decision to remain full code.  - I am trying to get patient to a point where he can be discharged safely and only require up to 3-4 sessions of HD.  This week he has received HD/UF daily, which is NOT feasible as an outpatient.

## 2018-03-16 NOTE — PROGRESS NOTE ADULT - SUBJECTIVE AND OBJECTIVE BOX
Pt seen and examined at bedside  Pt c/o SOB this AM, especially after transferring from bed to chair. HD yesterday uneventful.     Allergies:  No Known Allergies    Hospital Medications:   MEDICATIONS  (STANDING):  ALBUTerol/ipratropium for Nebulization 3 milliLiter(s) Nebulizer every 6 hours  amiodarone    Tablet 100 milliGRAM(s) Oral daily  aspirin enteric coated 81 milliGRAM(s) Oral daily  atorvastatin 80 milliGRAM(s) Oral at bedtime  buDESOnide   90 MICROgram(s) Inhaler 2 Puff(s) Inhalation two times a day  dextrose 5%. 1000 milliLiter(s) (50 mL/Hr) IV Continuous <Continuous>  dextrose 5%. 1000 milliLiter(s) (50 mL/Hr) IV Continuous <Continuous>  dextrose 50% Injectable 12.5 Gram(s) IV Push once  dextrose 50% Injectable 25 Gram(s) IV Push once  dextrose 50% Injectable 25 Gram(s) IV Push once  DOBUTamine Infusion 7.5 MICROgram(s)/kG/Min (16.942 mL/Hr) IV Continuous <Continuous>  docusate sodium 100 milliGRAM(s) Oral two times a day  finasteride 5 milliGRAM(s) Oral daily  influenza   Vaccine 0.5 milliLiter(s) IntraMuscular once  insulin glargine Injectable (LANTUS) 20 Unit(s) SubCutaneous at bedtime  insulin lispro (HumaLOG) corrective regimen sliding scale   SubCutaneous three times a day before meals  insulin lispro (HumaLOG) corrective regimen sliding scale   SubCutaneous at bedtime  insulin lispro Injectable (HumaLOG) 8 Unit(s) SubCutaneous three times a day before meals  levothyroxine 75 MICROGram(s) Oral daily  midodrine 30 milliGRAM(s) Oral three times a day  MIRACLE MOUTHWASH 30 milliLiter(s) Swish and Spit three times a day  Nephro-lynda 1 Tablet(s) Oral daily  polyethylene glycol 3350 17 Gram(s) Oral daily  Saliva Substitute (CAPHOSOL) 30 milliLiter(s) Swish and Spit every 6 hours  senna 2 Tablet(s) Oral at bedtime    VITALS:  T(F): 97.2 (03-16-18 @ 09:21), Max: 97.8 (03-15-18 @ 21:42)  HR: 83 (03-16-18 @ 09:55)  BP: 114/72 (03-16-18 @ 09:21)  RR: 18 (03-16-18 @ 09:21)  SpO2: 92% (03-16-18 @ 09:21)  Wt(kg): --    03-15 @ 07:01  -  03-16 @ 07:00  --------------------------------------------------------  IN: 1702.3 mL / OUT: 3300 mL / NET: -1597.7 mL      PHYSICAL EXAM:  Constitutional: NAD  HEENT: anicteric sclera, oropharynx clear, MMM  Neck: No JVD  Respiratory: Bibasilar crackles. Diminshed breath sounds.   Cardiovascular: S1, S2, RRR  Gastrointestinal: BS+, soft, NT/ND  Extremities: No cyanosis or clubbing. 3+ peripheral LE edema  Neurological: A/O x 3, no focal deficits  Psychiatric: Normal mood, normal affect  : No CVA tenderness. No rosa.   Skin: No rashes  Vascular Access: [facility] cath     LABS:  03-16    137  |  96<L>  |  52<H>  ----------------------------<  83  3.8   |  24  |  4.27<H>    Ca    8.7      16 Mar 2018 06:10  Mg     2.0     03-16      Creatinine Trend: 4.27 <--, 5.60 <--, 4.49 <--, 6.38 <--, 5.81 <--, 4.32 <--, 6.03 <--                        11.1   9.83  )-----------( 106      ( 16 Mar 2018 06:05 )             34.7     Urine Studies:      RADIOLOGY & ADDITIONAL STUDIES:

## 2018-03-16 NOTE — PROGRESS NOTE ADULT - PROBLEM SELECTOR PLAN 2
: supportive treatment:  titrate oxygen to keep o2 sat>=90%  2/26 began trial prednisone for sob-prednisone 20 mg po daily  2/28: started on a trial of prednisone two days ago, will monitor, however per his family, he never had a good response to previous trial of steroid.  3/1: on retrial of steroids: Pt thinks his breathing is better then before: Would like to cont steroids at this t alirio  3/2: cont steroids: pt seems to have stable SOB , does not seem to be improving further to me:  3/3: decrease steroids to 15 mg ad ay  3/4: on 15 mg today , decrease to 10 mg a day  3/5: taper off prednisone: already ordered  3*6: cont steroids  3/7: doing pretty poor: cont steroids trial for some time  3/8: on steroids at tis time: low dose steroids  3/9: It is difficult to discern if steroids are helpful in him: However the pt as well as the sister insists that we cont to try steroids: They think he feels a little better with it: Side effects have been explained to them:  3/10: cont 10 mg today too ? decrease to 5 mg soon  3/11: on steroids:  3/12: pt is on 5 mg of steroids: will taper  to off on next few days:  3/13: pt has been doing poorly: cont prednisone till 16 of March.  3/14: don't think steroids are helping much : will finish soon:  3/16: no significant benefit from steroids: DC steroids:   3/15:has extensive fibrosis: taper steroids to off tomorrow:

## 2018-03-16 NOTE — PROGRESS NOTE ADULT - SUBJECTIVE AND OBJECTIVE BOX
Patient is a 62y old  Male who presents with a chief complaint of SOB x few hours (20 Feb 2018 18:05)      SUBJECTIVE / OVERNIGHT EVENTS: Pt again developed worsening shortness of breath.  Breathing uncomfortably at side of bed asking if he can go for HD today.  Denies chest pain, noted to have 4 beats NSVT on telemetry.       MEDICATIONS  (STANDING):  ALBUTerol/ipratropium for Nebulization 3 milliLiter(s) Nebulizer every 6 hours  amiodarone    Tablet 100 milliGRAM(s) Oral daily  aspirin enteric coated 81 milliGRAM(s) Oral daily  atorvastatin 80 milliGRAM(s) Oral at bedtime  buDESOnide   90 MICROgram(s) Inhaler 2 Puff(s) Inhalation two times a day  dextrose 5%. 1000 milliLiter(s) (50 mL/Hr) IV Continuous <Continuous>  dextrose 5%. 1000 milliLiter(s) (50 mL/Hr) IV Continuous <Continuous>  dextrose 50% Injectable 12.5 Gram(s) IV Push once  dextrose 50% Injectable 25 Gram(s) IV Push once  dextrose 50% Injectable 25 Gram(s) IV Push once  DOBUTamine Infusion 7.5 MICROgram(s)/kG/Min (16.942 mL/Hr) IV Continuous <Continuous>  docusate sodium 100 milliGRAM(s) Oral two times a day  finasteride 5 milliGRAM(s) Oral daily  influenza   Vaccine 0.5 milliLiter(s) IntraMuscular once  insulin glargine Injectable (LANTUS) 20 Unit(s) SubCutaneous at bedtime  insulin lispro (HumaLOG) corrective regimen sliding scale   SubCutaneous three times a day before meals  insulin lispro (HumaLOG) corrective regimen sliding scale   SubCutaneous at bedtime  insulin lispro Injectable (HumaLOG) 8 Unit(s) SubCutaneous three times a day before meals  levothyroxine 75 MICROGram(s) Oral daily  midodrine 30 milliGRAM(s) Oral three times a day  MIRACLE MOUTHWASH 30 milliLiter(s) Swish and Spit three times a day  Nephro-lynda 1 Tablet(s) Oral daily  polyethylene glycol 3350 17 Gram(s) Oral daily  Saliva Substitute (CAPHOSOL) 30 milliLiter(s) Swish and Spit every 6 hours  senna 2 Tablet(s) Oral at bedtime    MEDICATIONS  (PRN):  benzocaine 15 mG/menthol 3.6 mG Lozenge 1 Lozenge Oral every 6 hours PRN Sore Throat  dextrose Gel 1 Dose(s) Oral once PRN Blood Glucose LESS THAN 70 milliGRAM(s)/deciLiter  dextrose Gel 1 Dose(s) Oral once PRN Blood Glucose LESS THAN 70 milliGRAM(s)/deciliter  glucagon  Injectable 1 milliGRAM(s) IntraMuscular once PRN Glucose <70 milliGRAM(s)/deciLiter  guaiFENesin    Syrup 100 milliGRAM(s) Oral every 6 hours PRN Cough  guaiFENesin   Syrup  (Sugar-Free) 200 milliGRAM(s) Oral every 6 hours PRN Cough  sodium chloride 0.65% Nasal 1 Spray(s) Both Nostrils every 2 hours PRN Nasal Congestion      Vital Signs Last 24 Hrs  T(C): 36.2 (16 Mar 2018 09:21), Max: 36.6 (15 Mar 2018 21:42)  T(F): 97.2 (16 Mar 2018 09:21), Max: 97.8 (15 Mar 2018 21:42)  HR: 83 (16 Mar 2018 09:55) (72 - 95)  BP: 114/72 (16 Mar 2018 09:21) (102/68 - 114/72)  BP(mean): --  RR: 18 (16 Mar 2018 09:21) (17 - 18)  SpO2: 92% (16 Mar 2018 09:21) (92% - 97%)  CAPILLARY BLOOD GLUCOSE      POCT Blood Glucose.: 171 mg/dL (16 Mar 2018 08:59)  POCT Blood Glucose.: 118 mg/dL (15 Mar 2018 23:03)  POCT Blood Glucose.: 86 mg/dL (15 Mar 2018 21:48)  POCT Blood Glucose.: 109 mg/dL (15 Mar 2018 17:26)  POCT Blood Glucose.: 186 mg/dL (15 Mar 2018 12:58)    I&O's Summary    15 Mar 2018 07:01  -  16 Mar 2018 07:00  --------------------------------------------------------  IN: 1702.3 mL / OUT: 3300 mL / NET: -1597.7 mL        PHYSICAL EXAM  GENERAL: Moderate respiratory distress, well-developed  HEAD:  Atraumatic, Normocephalic  EYES: EOMI, PERRLA, conjunctiva and sclera clear  NECK: +JVD  CHEST/LUNG: +Bibasilar crackles worse today than yesterday  HEART: Regular rate and rhythm; No murmurs, rubs, or gallops  ABDOMEN: Soft, Nontender, Nondistended; Bowel sounds present  EXTREMITIES:  3+ pitting LE edema b/l   PSYCH: AAOx3  SKIN: No rashes or lesions    LABS:                        11.1   9.83  )-----------( 106      ( 16 Mar 2018 06:05 )             34.7     03-16    137  |  96<L>  |  52<H>  ----------------------------<  83  3.8   |  24  |  4.27<H>    Ca    8.7      16 Mar 2018 06:10  Mg     2.0     03-16      PT/INR - ( 16 Mar 2018 06:10 )   PT: 24.6 SEC;   INR: 2.18          PTT - ( 15 Mar 2018 06:20 )  PTT:46.2 SEC        Consultant(s) Notes Reviewed:  Renal   Care Discussed with Consultants/Other Providers: Renal Dr. Ridley, HF Dr. De Souza

## 2018-03-16 NOTE — PROGRESS NOTE ADULT - SUBJECTIVE AND OBJECTIVE BOX
Patient is a 62y old  Male who presents with a chief complaint of SOB x few hours (20 Feb 2018 18:05)      Any change in ROS: pt is doing ok   SON +--  no cough   no phelgm   no chest pain     MEDICATIONS  (STANDING):  ALBUTerol/ipratropium for Nebulization 3 milliLiter(s) Nebulizer every 6 hours  amiodarone    Tablet 100 milliGRAM(s) Oral daily  aspirin enteric coated 81 milliGRAM(s) Oral daily  atorvastatin 80 milliGRAM(s) Oral at bedtime  buDESOnide   90 MICROgram(s) Inhaler 2 Puff(s) Inhalation two times a day  dextrose 5%. 1000 milliLiter(s) (50 mL/Hr) IV Continuous <Continuous>  dextrose 5%. 1000 milliLiter(s) (50 mL/Hr) IV Continuous <Continuous>  dextrose 50% Injectable 12.5 Gram(s) IV Push once  dextrose 50% Injectable 25 Gram(s) IV Push once  dextrose 50% Injectable 25 Gram(s) IV Push once  DOBUTamine Infusion 7.5 MICROgram(s)/kG/Min (16.942 mL/Hr) IV Continuous <Continuous>  docusate sodium 100 milliGRAM(s) Oral two times a day  finasteride 5 milliGRAM(s) Oral daily  influenza   Vaccine 0.5 milliLiter(s) IntraMuscular once  insulin glargine Injectable (LANTUS) 20 Unit(s) SubCutaneous at bedtime  insulin lispro (HumaLOG) corrective regimen sliding scale   SubCutaneous three times a day before meals  insulin lispro (HumaLOG) corrective regimen sliding scale   SubCutaneous at bedtime  levothyroxine 75 MICROGram(s) Oral daily  midodrine 30 milliGRAM(s) Oral three times a day  MIRACLE MOUTHWASH 30 milliLiter(s) Swish and Spit three times a day  Nephro-lynda 1 Tablet(s) Oral daily  polyethylene glycol 3350 17 Gram(s) Oral daily  Saliva Substitute (CAPHOSOL) 30 milliLiter(s) Swish and Spit every 6 hours  senna 2 Tablet(s) Oral at bedtime    MEDICATIONS  (PRN):  benzocaine 15 mG/menthol 3.6 mG Lozenge 1 Lozenge Oral every 6 hours PRN Sore Throat  dextrose Gel 1 Dose(s) Oral once PRN Blood Glucose LESS THAN 70 milliGRAM(s)/deciLiter  dextrose Gel 1 Dose(s) Oral once PRN Blood Glucose LESS THAN 70 milliGRAM(s)/deciliter  glucagon  Injectable 1 milliGRAM(s) IntraMuscular once PRN Glucose <70 milliGRAM(s)/deciLiter  guaiFENesin    Syrup 100 milliGRAM(s) Oral every 6 hours PRN Cough  guaiFENesin   Syrup  (Sugar-Free) 200 milliGRAM(s) Oral every 6 hours PRN Cough  sodium chloride 0.65% Nasal 1 Spray(s) Both Nostrils every 2 hours PRN Nasal Congestion    Vital Signs Last 24 Hrs  T(C): 36.4 (16 Mar 2018 13:20), Max: 36.6 (15 Mar 2018 21:42)  T(F): 97.6 (16 Mar 2018 13:20), Max: 97.8 (15 Mar 2018 21:42)  HR: 85 (16 Mar 2018 13:20) (72 - 95)  BP: 106/53 (16 Mar 2018 13:20) (102/68 - 114/72)  BP(mean): --  RR: 18 (16 Mar 2018 13:20) (17 - 18)  SpO2: 95% (16 Mar 2018 13:20) (92% - 97%)    I&O's Summary    15 Mar 2018 07:01  -  16 Mar 2018 07:00  --------------------------------------------------------  IN: 1702.3 mL / OUT: 3300 mL / NET: -1597.7 mL    16 Mar 2018 07:01  -  16 Mar 2018 14:09  --------------------------------------------------------  IN: 351.4 mL / OUT: 0 mL / NET: 351.4 mL          Physical Exam:   GENERAL: NAD, well-groomed, well-developed  HEENT: BELL/   Atraumatic, Normocephalic  ENMT: No tonsillar erythema, exudates, or enlargement; Moist mucous membranes, Good dentition, No lesions  NECK: Supple, No JVD, Normal thyroid  CHEST/LUNG: no wheezing  CVS: Regular rate and rhythm; No murmurs, rubs, or gallops  GI: : Soft, Nontender, Nondistended; Bowel sounds present  NERVOUS SYSTEM:  Alert & Oriented X3, Good concentration; Motor Strength 5/5 B/L upper and lower extremities; DTRs 2+ intact and symmetric  EXTREMITIES:  2+  edema  LYMPH: No lymphadenopathy noted  SKIN: No rashes or lesions  ENDOCRINOLOGY: No Thyromegaly  PSYCH: Appropriate    Labs:                              11.1   9.83  )-----------( 106      ( 16 Mar 2018 06:05 )             34.7                         10.7   9.71  )-----------( 110      ( 15 Mar 2018 06:20 )             34.7                         11.4   10.06 )-----------( 114      ( 14 Mar 2018 05:35 )             35.0                         11.2   15.86 )-----------( 123      ( 13 Mar 2018 05:15 )             34.8     03-16    137  |  96<L>  |  52<H>  ----------------------------<  83  3.8   |  24  |  4.27<H>  03-15    136  |  91<L>  |  71<H>  ----------------------------<  215<H>  4.3   |  22  |  5.60<H>  03-14    134<L>  |  92<L>  |  57<H>  ----------------------------<  102<H>  4.1   |  25  |  4.49<H>  03-13    132<L>  |  89<L>  |  87<H>  ----------------------------<  198<H>  4.4   |  23  |  6.38<H>    Ca    8.7      16 Mar 2018 06:10  Ca    8.6      15 Mar 2018 06:30  Mg     2.0     03-16  Mg     1.9     03-15      CAPILLARY BLOOD GLUCOSE      POCT Blood Glucose.: 115 mg/dL (16 Mar 2018 13:47)  POCT Blood Glucose.: 108 mg/dL (16 Mar 2018 13:26)  POCT Blood Glucose.: 75 mg/dL (16 Mar 2018 13:00)  POCT Blood Glucose.: 65 mg/dL (16 Mar 2018 12:43)  POCT Blood Glucose.: 171 mg/dL (16 Mar 2018 08:59)  POCT Blood Glucose.: 118 mg/dL (15 Mar 2018 23:03)  POCT Blood Glucose.: 86 mg/dL (15 Mar 2018 21:48)  POCT Blood Glucose.: 109 mg/dL (15 Mar 2018 17:26)        PT/INR - ( 16 Mar 2018 06:10 )   PT: 24.6 SEC;   INR: 2.18          PTT - ( 15 Mar 2018 06:20 )  PTT:46.2 SEC    Cultures:       < from: Xray Chest 1 View- PORTABLE-Urgent (02.26.18 @ 11:27) >  URE DATE:  Feb 26 2018         INTERPRETATION:  CLINICAL INFORMATION: Shortness of breath.   Cardiomyopathy.    TECHNIQUE: AP chest radiograph    COMPARISON: Chest radiograph performed 2/17/2018. CT abdomen and pelvis   performed 10/30/2017.    FINDINGS:    A right-sided dialysis catheter terminates over SVC. There is a   left-sided cardiac device. There is redemonstration of diffuse   interstitial and reticular opacities bilaterally, mildly improved since   2/17/2018. There are small bilateral pleural effusions.    IMPRESSION:    Persistent diffuse interstitial and reticular lung opacities bilaterally,   mildly improved since 2/17/2018, which may represent pulmonary edema   superimposed on patient's known interstitial lung disease.    Small bilateral pleural effusions.              ANNIA MURPHY M.D., RADIOLOGY RESIDENT  This document has been electronically signed.  NITIN MAHARAJ M.D. ATTENDING RADIOLOGIST  This document has been electronically signed. Feb 26 2018  2:54PM        < end of copied text >                        Studies  Chest X-RAY  CT SCAN Chest   Venous Dopplers: LE:   CT Abdomen  Others

## 2018-03-16 NOTE — PROGRESS NOTE ADULT - PROBLEM SELECTOR PLAN 1
Despite HD yesterday, and 2.8 kg UF achieved, pt still easily dyspneic.  Will order for 2 hour isloated UF treatment today.  Again readdress GOC with pt and his son today.   No clinical improvement despite nearly daily HD/PUF, in setting of ESRD, end stage heart failure and pulm fibrosis. Pt resistant to withdrawing any care at this time.

## 2018-03-16 NOTE — PROGRESS NOTE ADULT - PROBLEM SELECTOR PLAN 5
HR controlled  ac as per cards/medicine  management as per cards  3/1: INR is therapeutic:  3/3: INR is therapeutic  3/5: INR therapeutic::  3/9: INR is therapeutic:  3/11: INR is low:  3/12: 1.70  3/13: HR is controlled:: On AC per primary/Cardiology  3/14: HR seems to be controlled: on AC  3/15: Cont AC  3/16: Stable: on AC

## 2018-03-16 NOTE — PROGRESS NOTE ADULT - PROBLEM SELECTOR PLAN 1
has underlying pulmonary fibrosis and respiratory failure precipitated by influenza:   Titrate fio2 to keep o2 sat >=90% remains on 50% VM  NIV QHS/PRV  2/28: cont NIV at night and daytime too  3/1: says his breathing is better: would cont to use steroids as well as bipap at night time: Cont oxygen  to keep sao2 above 88%:  3/2: doing reasonable: cont current care:  3/3: decrease  steroids to 15 mg a day  3/4: given risk of increasing retention of salt and water , and he has been on dobutamine: would cut it down to 10 mg tomorrow and slowly taper it to off  3/5: It is very hard to determine whether steroids are beneficially improving his SOB: I doubt that steroids are of much help here: They may also potentiate salt and water retention! Will taper them off!  3/6: today sister says she would like to continue steroids for sometime : would continue for now and taper slowly: May be send home on 10 mg of steroids:  3/7: doing ok : cont current care: Has CMP and is on Dobutamine Drip  3/8: on dobutamine  3/9: cont dobutamine and night time BiPAP  3/10: cont current treatment : breathing wise he remains tenuous:  3/11: on dobutamine:  3/12: o n dobutamine:  3/13: to cont dobutamine: Pt is on night time bipap: But he has not used it in last few days but would like to have one at home upon dc: May arrange for it : Pt encouraged to use the bipap daily:  3/14: doing ok : cont current treatment: finish steroids on 16th  3/15: doing same: SOB off and on :  3/19: pt uses bipap prn at night: yesterday she did not use it

## 2018-03-17 LAB
BASOPHILS # BLD AUTO: 0.04 K/UL — SIGNIFICANT CHANGE UP (ref 0–0.2)
BASOPHILS NFR BLD AUTO: 0.5 % — SIGNIFICANT CHANGE UP (ref 0–2)
BUN SERPL-MCNC: 72 MG/DL — HIGH (ref 7–23)
CALCIUM SERPL-MCNC: 8.2 MG/DL — LOW (ref 8.4–10.5)
CHLORIDE SERPL-SCNC: 90 MMOL/L — LOW (ref 98–107)
CO2 SERPL-SCNC: 23 MMOL/L — SIGNIFICANT CHANGE UP (ref 22–31)
CREAT SERPL-MCNC: 5.61 MG/DL — HIGH (ref 0.5–1.3)
EOSINOPHIL # BLD AUTO: 0.2 K/UL — SIGNIFICANT CHANGE UP (ref 0–0.5)
EOSINOPHIL NFR BLD AUTO: 2.3 % — SIGNIFICANT CHANGE UP (ref 0–6)
GLUCOSE SERPL-MCNC: 193 MG/DL — HIGH (ref 70–99)
HCT VFR BLD CALC: 33.8 % — LOW (ref 39–50)
HGB BLD-MCNC: 10.3 G/DL — LOW (ref 13–17)
IMM GRANULOCYTES # BLD AUTO: 0.03 # — SIGNIFICANT CHANGE UP
IMM GRANULOCYTES NFR BLD AUTO: 0.3 % — SIGNIFICANT CHANGE UP (ref 0–1.5)
INR BLD: 2.15 — HIGH (ref 0.88–1.17)
LYMPHOCYTES # BLD AUTO: 0.82 K/UL — LOW (ref 1–3.3)
LYMPHOCYTES # BLD AUTO: 9.3 % — LOW (ref 13–44)
MAGNESIUM SERPL-MCNC: 2 MG/DL — SIGNIFICANT CHANGE UP (ref 1.6–2.6)
MCHC RBC-ENTMCNC: 30 PG — SIGNIFICANT CHANGE UP (ref 27–34)
MCHC RBC-ENTMCNC: 30.5 % — LOW (ref 32–36)
MCV RBC AUTO: 98.5 FL — SIGNIFICANT CHANGE UP (ref 80–100)
MONOCYTES # BLD AUTO: 1.16 K/UL — HIGH (ref 0–0.9)
MONOCYTES NFR BLD AUTO: 13.2 % — SIGNIFICANT CHANGE UP (ref 2–14)
NEUTROPHILS # BLD AUTO: 6.55 K/UL — SIGNIFICANT CHANGE UP (ref 1.8–7.4)
NEUTROPHILS NFR BLD AUTO: 74.4 % — SIGNIFICANT CHANGE UP (ref 43–77)
NRBC # FLD: 0 — SIGNIFICANT CHANGE UP
PLATELET # BLD AUTO: 97 K/UL — LOW (ref 150–400)
PMV BLD: 13.3 FL — HIGH (ref 7–13)
POTASSIUM SERPL-MCNC: 4.3 MMOL/L — SIGNIFICANT CHANGE UP (ref 3.5–5.3)
POTASSIUM SERPL-SCNC: 4.3 MMOL/L — SIGNIFICANT CHANGE UP (ref 3.5–5.3)
PROTHROM AB SERPL-ACNC: 25.1 SEC — HIGH (ref 9.8–13.1)
RBC # BLD: 3.43 M/UL — LOW (ref 4.2–5.8)
RBC # FLD: 18.6 % — HIGH (ref 10.3–14.5)
SODIUM SERPL-SCNC: 133 MMOL/L — LOW (ref 135–145)
WBC # BLD: 8.8 K/UL — SIGNIFICANT CHANGE UP (ref 3.8–10.5)
WBC # FLD AUTO: 8.8 K/UL — SIGNIFICANT CHANGE UP (ref 3.8–10.5)

## 2018-03-17 PROCEDURE — 99233 SBSQ HOSP IP/OBS HIGH 50: CPT

## 2018-03-17 RX ORDER — WARFARIN SODIUM 2.5 MG/1
5 TABLET ORAL ONCE
Qty: 0 | Refills: 0 | Status: COMPLETED | OUTPATIENT
Start: 2018-03-17 | End: 2018-03-17

## 2018-03-17 RX ADMIN — MIDODRINE HYDROCHLORIDE 30 MILLIGRAM(S): 2.5 TABLET ORAL at 05:29

## 2018-03-17 RX ADMIN — Medication 2 PUFF(S): at 08:55

## 2018-03-17 RX ADMIN — ATORVASTATIN CALCIUM 80 MILLIGRAM(S): 80 TABLET, FILM COATED ORAL at 21:49

## 2018-03-17 RX ADMIN — Medication 75 MICROGRAM(S): at 05:22

## 2018-03-17 RX ADMIN — Medication 3: at 18:03

## 2018-03-17 RX ADMIN — Medication 100 MILLIGRAM(S): at 05:22

## 2018-03-17 RX ADMIN — Medication 1 TABLET(S): at 14:03

## 2018-03-17 RX ADMIN — Medication 3 MILLILITER(S): at 22:25

## 2018-03-17 RX ADMIN — WARFARIN SODIUM 5 MILLIGRAM(S): 2.5 TABLET ORAL at 17:13

## 2018-03-17 RX ADMIN — DIPHENHYDRAMINE HYDROCHLORIDE AND LIDOCAINE HYDROCHLORIDE AND ALUMINUM HYDROXIDE AND MAGNESIUM HYDRO 30 MILLILITER(S): KIT at 05:23

## 2018-03-17 RX ADMIN — Medication 2 PUFF(S): at 21:48

## 2018-03-17 RX ADMIN — Medication 100 MILLIGRAM(S): at 17:13

## 2018-03-17 RX ADMIN — MIDODRINE HYDROCHLORIDE 30 MILLIGRAM(S): 2.5 TABLET ORAL at 14:04

## 2018-03-17 RX ADMIN — INSULIN GLARGINE 20 UNIT(S): 100 INJECTION, SOLUTION SUBCUTANEOUS at 21:48

## 2018-03-17 RX ADMIN — Medication 16.94 MICROGRAM(S)/KG/MIN: at 05:22

## 2018-03-17 RX ADMIN — Medication 16.94 MICROGRAM(S)/KG/MIN: at 21:52

## 2018-03-17 RX ADMIN — Medication 30 MILLILITER(S): at 05:23

## 2018-03-17 RX ADMIN — Medication 3 MILLILITER(S): at 15:10

## 2018-03-17 RX ADMIN — FINASTERIDE 5 MILLIGRAM(S): 5 TABLET, FILM COATED ORAL at 14:04

## 2018-03-17 RX ADMIN — DIPHENHYDRAMINE HYDROCHLORIDE AND LIDOCAINE HYDROCHLORIDE AND ALUMINUM HYDROXIDE AND MAGNESIUM HYDRO 30 MILLILITER(S): KIT at 21:51

## 2018-03-17 RX ADMIN — Medication 3 MILLILITER(S): at 04:15

## 2018-03-17 RX ADMIN — AMIODARONE HYDROCHLORIDE 100 MILLIGRAM(S): 400 TABLET ORAL at 05:29

## 2018-03-17 RX ADMIN — Medication 30 MILLILITER(S): at 17:13

## 2018-03-17 RX ADMIN — Medication 81 MILLIGRAM(S): at 14:04

## 2018-03-17 RX ADMIN — MIDODRINE HYDROCHLORIDE 30 MILLIGRAM(S): 2.5 TABLET ORAL at 21:49

## 2018-03-17 RX ADMIN — Medication 1: at 09:49

## 2018-03-17 RX ADMIN — Medication 16.94 MICROGRAM(S)/KG/MIN: at 17:13

## 2018-03-17 RX ADMIN — DIPHENHYDRAMINE HYDROCHLORIDE AND LIDOCAINE HYDROCHLORIDE AND ALUMINUM HYDROXIDE AND MAGNESIUM HYDRO 30 MILLILITER(S): KIT at 14:04

## 2018-03-17 RX ADMIN — Medication 16.94 MICROGRAM(S)/KG/MIN: at 08:55

## 2018-03-17 NOTE — PROGRESS NOTE ADULT - PROBLEM SELECTOR PLAN 3
titrate oxygen to keep o2 sat>=90%  c/w suman and pulmicort  2/26 began prednisone 20mg po daily  2/28: cont steroids to see how he responds to it in next few days  3/1: cont steroids as well as BD  3/2: cont steroids  3/3:major issue is pulmonary fibrosis: cont current care:  3/14: cont current care:  3/5: can change pulmicort to symbicort:  3/6: cont low dose steroids  3/7: on steroids  3/8: steoids taper  3/9: Cont 10 mg of steroids at this time  3/10: cont 10 mg , will change to 5 mg soon  3/11: no wheezing: today : michael change to po steroids twice day : till her MARGIE is done tomorrow:  3/12: no wheezing: cont BD : taper steroids to off  3/13: Cont Pulmicort: doubt he will be able to use Symbicort properly:  3/14: cont pulmicort as well as bronchodilators:  3/15: Cont bronchodilators:  3/17 Pulmicort, Duoneb

## 2018-03-17 NOTE — PROGRESS NOTE ADULT - SUBJECTIVE AND OBJECTIVE BOX
Patient is a 62y old  Male who presents with a chief complaint of SOB x few hours (20 Feb 2018 18:05)    Any change in ROS: On HD. nasal cannular. SOB alleviated. on dobutamine gtt     MEDICATIONS  (STANDING):  ALBUTerol/ipratropium for Nebulization 3 milliLiter(s) Nebulizer every 6 hours  amiodarone    Tablet 100 milliGRAM(s) Oral daily  aspirin enteric coated 81 milliGRAM(s) Oral daily  atorvastatin 80 milliGRAM(s) Oral at bedtime  buDESOnide   90 MICROgram(s) Inhaler 2 Puff(s) Inhalation two times a day  dextrose 5%. 1000 milliLiter(s) (50 mL/Hr) IV Continuous <Continuous>  dextrose 5%. 1000 milliLiter(s) (50 mL/Hr) IV Continuous <Continuous>  dextrose 50% Injectable 12.5 Gram(s) IV Push once  dextrose 50% Injectable 25 Gram(s) IV Push once  dextrose 50% Injectable 25 Gram(s) IV Push once  DOBUTamine Infusion 7.5 MICROgram(s)/kG/Min (16.942 mL/Hr) IV Continuous <Continuous>  docusate sodium 100 milliGRAM(s) Oral two times a day  finasteride 5 milliGRAM(s) Oral daily  influenza   Vaccine 0.5 milliLiter(s) IntraMuscular once  insulin glargine Injectable (LANTUS) 20 Unit(s) SubCutaneous at bedtime  insulin lispro (HumaLOG) corrective regimen sliding scale   SubCutaneous three times a day before meals  insulin lispro (HumaLOG) corrective regimen sliding scale   SubCutaneous at bedtime  levothyroxine 75 MICROGram(s) Oral daily  midodrine 30 milliGRAM(s) Oral three times a day  MIRACLE MOUTHWASH 30 milliLiter(s) Swish and Spit three times a day  Nephro-lynda 1 Tablet(s) Oral daily  polyethylene glycol 3350 17 Gram(s) Oral daily  Saliva Substitute (CAPHOSOL) 30 milliLiter(s) Swish and Spit every 6 hours  senna 2 Tablet(s) Oral at bedtime    MEDICATIONS  (PRN):  benzocaine 15 mG/menthol 3.6 mG Lozenge 1 Lozenge Oral every 6 hours PRN Sore Throat  dextrose Gel 1 Dose(s) Oral once PRN Blood Glucose LESS THAN 70 milliGRAM(s)/deciLiter  dextrose Gel 1 Dose(s) Oral once PRN Blood Glucose LESS THAN 70 milliGRAM(s)/deciliter  glucagon  Injectable 1 milliGRAM(s) IntraMuscular once PRN Glucose <70 milliGRAM(s)/deciLiter  guaiFENesin    Syrup 100 milliGRAM(s) Oral every 6 hours PRN Cough  guaiFENesin   Syrup  (Sugar-Free) 200 milliGRAM(s) Oral every 6 hours PRN Cough  sodium chloride 0.65% Nasal 1 Spray(s) Both Nostrils every 2 hours PRN Nasal Congestion    Vital Signs Last 24 Hrs  T(C): 36.9 (17 Mar 2018 09:24), Max: 36.9 (17 Mar 2018 09:24)  T(F): 98.4 (17 Mar 2018 09:24), Max: 98.4 (17 Mar 2018 09:24)  HR: 78 (17 Mar 2018 09:24) (59 - 99)  BP: 104/60 (17 Mar 2018 09:24) (102/62 - 115/57)  BP(mean): --  RR: 18 (17 Mar 2018 09:24) (17 - 18)  SpO2: 97% (17 Mar 2018 07:15) (95% - 99%)    I&O's Summary    16 Mar 2018 07:01  -  17 Mar 2018 07:00  --------------------------------------------------------  IN: 1252.8 mL / OUT: 1900 mL / NET: -647.2 mL      Physical Exam:   General: NAD, well developed, well nourished.   Eyes: PERRL, EOM intact; conjunctiva and sclera clear  Head: Normocephalic, atraumatic  ENMT: No nasal discharge, airway clear  Neck: Supple, non tender,  no masses, no JVD  Respiratory: Clear to auscultation bilaterally without wheezing, rhonchi or rales  Cardiovascular: irregular rate and rhythm, no murmurs.  Gastrointestinal: Soft non-tender non-distended, positive bowel sounds  Extremities: Normal range of motion, no clubbing, cyanosis. (++) bilateral lower extremity edema  Vascular: Peripheral pulses palpable 2+ bilaterally  Neurological: Alert and oriented x3, follows commands, answers questions appropriately.   Skin: Warm and dry. No obvious rash  Lymph Nodes: No acute cervical or supraclavicular adenopathy  Musculoskeletal: kyphosis ( - ), Move all extremities,   Psychiatric: Cooperative and appropriate mood    Labs:                  10.3   8.80  )-----------( 97       ( 17 Mar 2018 09:12 )             33.8                         11.1   9.83  )-----------( 106      ( 16 Mar 2018 06:05 )             34.7                         10.7   9.71  )-----------( 110      ( 15 Mar 2018 06:20 )             34.7                         11.4   10.06 )-----------( 114      ( 14 Mar 2018 05:35 )             35.0     03-17    133<L>  |  90<L>  |  72<H>  ----------------------------<  193<H>  4.3   |  23  |  5.61<H>  03-16    137  |  96<L>  |  52<H>  ----------------------------<  83  3.8   |  24  |  4.27<H>  03-15    136  |  91<L>  |  71<H>  ----------------------------<  215<H>  4.3   |  22  |  5.60<H>  03-14    134<L>  |  92<L>  |  57<H>  ----------------------------<  102<H>  4.1   |  25  |  4.49<H>    Ca    8.2<L>      17 Mar 2018 09:12  Ca    8.7      16 Mar 2018 06:10  Mg     2.0     03-17  Mg     2.0     03-16      CAPILLARY BLOOD GLUCOSE      POCT Blood Glucose.: 153 mg/dL (17 Mar 2018 09:35)  POCT Blood Glucose.: 106 mg/dL (17 Mar 2018 05:14)  POCT Blood Glucose.: 223 mg/dL (16 Mar 2018 22:47)  POCT Blood Glucose.: 148 mg/dL (16 Mar 2018 17:58)  POCT Blood Glucose.: 115 mg/dL (16 Mar 2018 13:47)  POCT Blood Glucose.: 108 mg/dL (16 Mar 2018 13:26)  POCT Blood Glucose.: 75 mg/dL (16 Mar 2018 13:00)  POCT Blood Glucose.: 65 mg/dL (16 Mar 2018 12:43)        PT/INR - ( 17 Mar 2018 09:12 )   PT: 25.1 SEC;   INR: 2.15           Cultures:         Studies  Chest X-RAY < from: Xray Chest 1 View- PORTABLE-Urgent (02.26.18 @ 11:27) >  EXAM:  XR CHEST PORTABLE URGENT 1V        PROCEDURE DATE:  Feb 26 2018         INTERPRETATION:  CLINICAL INFORMATION: Shortness of breath.   Cardiomyopathy.    TECHNIQUE: AP chest radiograph    COMPARISON: Chest radiograph performed 2/17/2018. CT abdomen and pelvis   performed 10/30/2017.    FINDINGS:    A right-sided dialysis catheter terminates over SVC. There is a   left-sided cardiac device. There is redemonstration of diffuse   interstitial and reticular opacities bilaterally, mildly improved since   2/17/2018. There are small bilateral pleural effusions.    IMPRESSION:    Persistent diffuse interstitial and reticular lung opacities bilaterally,   mildly improved since 2/17/2018, which may represent pulmonary edema   superimposed on patient's known interstitial lung disease.    Small bilateral pleural effusions.      < end of copied text >    CT SCAN Chest  < from: CT Chest No Cont (06.05.17 @ 15:45) >  EXAM:  CT CHEST        PROCEDURE DATE:  Jun 5 2017         INTERPRETATION:  CLINICAL INFORMATION: End-stage renal disease and   congestive heart failure. Rule out pneumonia.    PROCEDURE:   CT of the Chest was performed without intravenous contrast.   Intravenous contrast: None.    Reconstructions were performed in axial thin, axial maximum intensity   projection, sagittal and coronal planes.    COMPARISON: Chest x-ray 6/2/2017.    FINDINGS:    A right IJ hemodialysis catheter terminates at the SVC/right atrial   junction.    LUNGS AND LARGE AIRWAYS: Small amount of secretions in the trachea.   Diffuse bilateral groundglass opacities with traction bronchiectasis   throughout the lungs with regions of polygonal sparing bilateral lower   andleft upper lobes. There is honeycombing scattered throughout the   lungs. Interlobular septal thickening is noted in the bilateral upper   lobes. Patchy tree-in-bud opacities are noted in the bilateral lower   lobes. Scattered small calcified granulomas.  PLEURA: Trace bilateral pleural effusions.  VESSELS: Atherosclerotic calcifications of the aorta and coronary   arteries.  HEART: Cardiomegaly. No pericardial effusion.  MEDIASTINUM AND ASH: No lymphadenopathy.  CHEST WALL AND LOWER NECK: Left chest wall AICD. Generalized anasarca.  UPPER ABDOMEN: Hyperdense material within the gallbladder may reflect   vicarious excretion of iodinated contrast from recent procedure.  BONES: Degenerative changes of the spine.    IMPRESSION:    Extensive pulmonary fibrosis.    Evidence of superimposed mild small airway disease.      < end of copied text >    Venous Dopplers: LE:   CT Abdomen  Others  < from: Transthoracic Echocardiogram (10.18.17 @ 09:48) >    Patient name: Patrice Plascencia  YOB: 1955   Age: 62 (M)   MR#: [id number]  Study Date: 10/18/2017  Location: [place] [id number] [address]ographer: Mery Garcia  Study quality: Technically good  Referring Physician: Channing Elena MD  Blood Pressure: 81/46 mmHg  Height: 180 cm  Weight: 79 kg  BSA: 2 m2  ------------------------------------------------------------------------  PROCEDURE: Transthoracic echocardiogram with 2-D, M-Mode  and complete spectral and color flow Doppler.  Patient was injected with 10 cc's of aerosolized saline.  INDICATION: Unspecified atrial fibrillation (I48.91),  Unspecified atrial flutter (I48.92)  ------------------------------------------------------------------------  DIMENSIONS:  Dimensions:     Normal Values:  LA:     5.4 cm    2.0 - 4.0 cm  Ao:     2.4 cm    2.0 - 3.8 cm  SEPTUM: 0.7 cm    0.6 - 1.2 cm  PWT:    1.2 cm    0.6 - 1.1 cm  LVIDd:  6.2 cm    3.0 - 5.6 cm  LVIDs:  5.6 cm    1.8 - 4.0 cm  Derived Variables:  LVMI: 123 g/m2  RWT: 0.38  Fractional short: 10 %  Ejection Fraction (Teicholtz): 21 %  ------------------------------------------------------------------------  OBSERVATIONS:  Mitral Valve: Normal mitral valve. Mild mitral  regurgitation.  Aortic Root: Normal aortic root.  Aortic Valve: Normal trileaflet aortic valve.  Left Atrium: Severely dilated left atrium.  LA volume index  = 56 cc/m2.  Left Ventricle: Severe global left ventricular systolic  dysfunction. Flattening of the interventricular septum in  both systole and diastole is  consistent with right  ventricular pressure overload. Moderate left ventricular  enlargement.  Right Heart: Moderate right atrial enlargement. Right  ventricular enlargement with decreased right ventricular  systolic function. A device wire is noted in the right  heart. Normal tricuspid valve.  Moderate tricuspid  regurgitation. Normal pulmonic valve. Mild pulmonic  regurgitation.  Pericardium/PleuraNormal pericardium with trace pericardial  effusion.  Hemodynamic: Estimated right ventricular systolic pressure  equals 55 mm Hg, assuming right atrial pressure equals 10  mm Hg, consistent with moderate pulmonary hypertension.  Agitated saline injection resulted in a large amount of  bubbles seen in the right heart. Although the exact amount  of cardiac cycles that occurred prior to the crossing of  the bubbles is unclear, the findings are suggestive of a  significant shunt. Consider MARGIE for further workup, if  clinically warrnated.  ------------------------------------------------------------------------  CONCLUSIONS:  1. Normal trileaflet aortic valve.  2. Severely dilated left atrium.  LA volume index = 56  cc/m2.  3. Moderate left ventricular enlargement.  4. Severe global left ventricular systolic dysfunction.  Flattening of the interventricular septum in both systole  and diastole is  consistent with right ventricular pressure  overload.  5. Right ventricular enlargement with decreased right  ventricular systolic function. A device wire is noted in  the right heart.  6. Normal tricuspid valve.  Moderate tricuspid  regurgitation.  7. Normal pulmonic valve. Mild pulmonic regurgitation.  Estimated pulmonary artery systolic pressure equals 55 mm  Hg, assuming right atrial pressure equals 10  mm Hg,  consistent with moderate pulmonary hypertension.  8. Agitated saline injection resulted in a large amount of  bubbles seen in the right heart. Although the exact amount  of cardiac cycles that occurred prior to the crossing of  the bubbles is unclear, the findings are suggestive of a  significant shunt. Consider MARGIE for further workup, if  clinically warrnated.  ----------------------------------------------    < end of copied text >

## 2018-03-17 NOTE — PROGRESS NOTE ADULT - PROBLEM SELECTOR PLAN 4
hd as per renal  keep net negative as able  3/1: on HD  3/17 HD  3/4: on HD  3/9: cont HD per renal  3/11: on HD  3/12: on HD  3/14: Cont HD  3/15: On HD

## 2018-03-17 NOTE — PROGRESS NOTE ADULT - PROBLEM SELECTOR PLAN 2
- Pt remains severely volume overloaded and there is no end point to adequate removal of his volume.  We have extensively discussed this with pt and he wants to stay in the hospital as long as it takes.  Pt required daily HD/PUF this week, which is not feasible as an outpatient.    - On dobutamine infusion - HD/UF per renal.  Discussed with renal (Dr. Ridley), who has attempted additional sessions of UF/HD as outpatient, but pt ultimately bounces back to hospital.    - As per cardio, no indication for TTE with bubble for R to L shunt eval because patient is not candidate for possible PFO repair at this time.  - Pt unable to take BB or ACE/ARB as he is chronically hypotensive on Midodrine  - I spoke with HF attending Dr. De Souza, who knows pt well from previous admissions.  Unfortunately no additional therapy can be offered for the patient and he is currently receiving everything that can possibly be offered including HD and dobutamine gtt.  - Pt with no evidence of excess fluid intake or dietary indiscretion.

## 2018-03-17 NOTE — PROGRESS NOTE ADULT - PROBLEM SELECTOR PLAN 5
HR controlled  ac as per cards/medicine  management as per cards  3/1: INR is therapeutic:  3/3: INR is therapeutic  3/5: INR therapeutic::  3/9: INR is therapeutic:  3/11: INR is low:  3/12: 1.70  3/13: HR is controlled:: On AC per primary/Cardiology  3/14: HR seems to be controlled: on AC  3/15: Cont AC  3/16: Stable: on AC  3/17 on AC

## 2018-03-17 NOTE — PROGRESS NOTE ADULT - SUBJECTIVE AND OBJECTIVE BOX
Patient is a 62y old  Male who presents with a chief complaint of SOB x few hours (20 Feb 2018 18:05)      SUBJECTIVE / OVERNIGHT EVENTS: Pt seen in HD today, appears the same, chronically ill and short of breath.  No chest pain.       MEDICATIONS  (STANDING):  ALBUTerol/ipratropium for Nebulization 3 milliLiter(s) Nebulizer every 6 hours  amiodarone    Tablet 100 milliGRAM(s) Oral daily  aspirin enteric coated 81 milliGRAM(s) Oral daily  atorvastatin 80 milliGRAM(s) Oral at bedtime  buDESOnide   90 MICROgram(s) Inhaler 2 Puff(s) Inhalation two times a day  dextrose 5%. 1000 milliLiter(s) (50 mL/Hr) IV Continuous <Continuous>  dextrose 5%. 1000 milliLiter(s) (50 mL/Hr) IV Continuous <Continuous>  dextrose 50% Injectable 12.5 Gram(s) IV Push once  dextrose 50% Injectable 25 Gram(s) IV Push once  dextrose 50% Injectable 25 Gram(s) IV Push once  DOBUTamine Infusion 7.5 MICROgram(s)/kG/Min (16.942 mL/Hr) IV Continuous <Continuous>  docusate sodium 100 milliGRAM(s) Oral two times a day  finasteride 5 milliGRAM(s) Oral daily  influenza   Vaccine 0.5 milliLiter(s) IntraMuscular once  insulin glargine Injectable (LANTUS) 20 Unit(s) SubCutaneous at bedtime  insulin lispro (HumaLOG) corrective regimen sliding scale   SubCutaneous three times a day before meals  insulin lispro (HumaLOG) corrective regimen sliding scale   SubCutaneous at bedtime  levothyroxine 75 MICROGram(s) Oral daily  midodrine 30 milliGRAM(s) Oral three times a day  MIRACLE MOUTHWASH 30 milliLiter(s) Swish and Spit three times a day  Nephro-lynda 1 Tablet(s) Oral daily  polyethylene glycol 3350 17 Gram(s) Oral daily  Saliva Substitute (CAPHOSOL) 30 milliLiter(s) Swish and Spit every 6 hours  senna 2 Tablet(s) Oral at bedtime    MEDICATIONS  (PRN):  benzocaine 15 mG/menthol 3.6 mG Lozenge 1 Lozenge Oral every 6 hours PRN Sore Throat  dextrose Gel 1 Dose(s) Oral once PRN Blood Glucose LESS THAN 70 milliGRAM(s)/deciLiter  dextrose Gel 1 Dose(s) Oral once PRN Blood Glucose LESS THAN 70 milliGRAM(s)/deciliter  glucagon  Injectable 1 milliGRAM(s) IntraMuscular once PRN Glucose <70 milliGRAM(s)/deciLiter  guaiFENesin    Syrup 100 milliGRAM(s) Oral every 6 hours PRN Cough  guaiFENesin   Syrup  (Sugar-Free) 200 milliGRAM(s) Oral every 6 hours PRN Cough  sodium chloride 0.65% Nasal 1 Spray(s) Both Nostrils every 2 hours PRN Nasal Congestion      Vital Signs Last 24 Hrs  T(C): 36.4 (17 Mar 2018 13:21), Max: 36.9 (17 Mar 2018 09:24)  T(F): 97.5 (17 Mar 2018 13:21), Max: 98.4 (17 Mar 2018 09:24)  HR: 65 (17 Mar 2018 13:59) (59 - 99)  BP: 115/55 (17 Mar 2018 13:59) (102/62 - 134/60)  BP(mean): --  RR: 18 (17 Mar 2018 13:21) (17 - 18)  SpO2: 97% (17 Mar 2018 07:15) (95% - 99%)  CAPILLARY BLOOD GLUCOSE      POCT Blood Glucose.: 90 mg/dL (17 Mar 2018 14:02)  POCT Blood Glucose.: 153 mg/dL (17 Mar 2018 09:35)  POCT Blood Glucose.: 106 mg/dL (17 Mar 2018 05:14)  POCT Blood Glucose.: 223 mg/dL (16 Mar 2018 22:47)  POCT Blood Glucose.: 148 mg/dL (16 Mar 2018 17:58)    I&O's Summary    16 Mar 2018 07:01  -  17 Mar 2018 07:00  --------------------------------------------------------  IN: 1252.8 mL / OUT: 1900 mL / NET: -647.2 mL    17 Mar 2018 07:01  -  17 Mar 2018 14:07  --------------------------------------------------------  IN: 500 mL / OUT: 3500 mL / NET: -3000 mL        PHYSICAL EXAM  GENERAL: Mild respiratory distress, well-developed  HEAD:  Atraumatic, Normocephalic  EYES: EOMI, PERRLA, conjunctiva and sclera clear  NECK: Supple, No JVD  CHEST/LUNG: Mild bibasilar crackles   HEART: Regular rate and rhythm; No murmurs, rubs, or gallops  ABDOMEN: Soft, Nontender, Nondistended; Bowel sounds present  EXTREMITIES:  3+ pitting edema in b/l LE  PSYCH: AAOx3  SKIN: No rashes or lesions    LABS:                        10.3   8.80  )-----------( 97       ( 17 Mar 2018 09:12 )             33.8     03-17    133<L>  |  90<L>  |  72<H>  ----------------------------<  193<H>  4.3   |  23  |  5.61<H>    Ca    8.2<L>      17 Mar 2018 09:12  Mg     2.0     03-17      PT/INR - ( 17 Mar 2018 09:12 )   PT: 25.1 SEC;   INR: 2.15            Consultant(s) Notes Reviewed:  Renal, Pulm

## 2018-03-17 NOTE — PROGRESS NOTE ADULT - PROBLEM SELECTOR PLAN 7
Inotropy per cardiology team.  on dobutamine and midodrine  3/1: on dobutamine  3/3: cont current care by primary team  3/4: on dobutaime:  3/5: On Midodrine and Dobutamine::  3/11: on dobutamine still  3/15: cont midodrine as wella s dobutamine:  3/16: cont current care:  3/17 on Midodrine

## 2018-03-17 NOTE — PROGRESS NOTE ADULT - PROBLEM SELECTOR PLAN 1
completed HD now, rx sheet reviewed, 3kg UF achieved, uneventful, bp remained stable today during hd  had 2 hour isloated UF treatment 3/16  c/w renal diet, fluid restriction  end stage heart failure and pulm fibrosis. Pt resistant to withdrawing any care at this time.

## 2018-03-17 NOTE — PROGRESS NOTE ADULT - PROBLEM SELECTOR PLAN 1
- Multifactorial due to COPD, pulmonary fibrosis and end stage heart failure.  No additional means of therapy will reverse his chronic illnesses at this point.  - Supplemental oxygen to maintain O2 sat >88% s/p completion of prolonged steroid taper  - BiPAP QHS

## 2018-03-17 NOTE — PROGRESS NOTE ADULT - PROBLEM SELECTOR PLAN 4
- FS glucose elevated, likely because of prednisone.   - Continue with Lantus 20U QHS, premeal insulin discontinued as FSG readings are lower now that pt is off steroids.

## 2018-03-17 NOTE — PROGRESS NOTE ADULT - SUBJECTIVE AND OBJECTIVE BOX
Pt seen and examined at bedside, in hd unit    completed hd, tolerated well  feeling better  sob better    Allergies:  No Known Allergies    Hospital Medications:   MEDICATIONS  (STANDING):  ALBUTerol/ipratropium for Nebulization 3 milliLiter(s) Nebulizer every 6 hours  amiodarone    Tablet 100 milliGRAM(s) Oral daily  aspirin enteric coated 81 milliGRAM(s) Oral daily  atorvastatin 80 milliGRAM(s) Oral at bedtime  buDESOnide   90 MICROgram(s) Inhaler 2 Puff(s) Inhalation two times a day  dextrose 5%. 1000 milliLiter(s) (50 mL/Hr) IV Continuous <Continuous>  dextrose 5%. 1000 milliLiter(s) (50 mL/Hr) IV Continuous <Continuous>  dextrose 50% Injectable 12.5 Gram(s) IV Push once  dextrose 50% Injectable 25 Gram(s) IV Push once  dextrose 50% Injectable 25 Gram(s) IV Push once  DOBUTamine Infusion 7.5 MICROgram(s)/kG/Min (16.942 mL/Hr) IV Continuous <Continuous>  docusate sodium 100 milliGRAM(s) Oral two times a day  finasteride 5 milliGRAM(s) Oral daily  influenza   Vaccine 0.5 milliLiter(s) IntraMuscular once  insulin glargine Injectable (LANTUS) 20 Unit(s) SubCutaneous at bedtime  insulin lispro (HumaLOG) corrective regimen sliding scale   SubCutaneous three times a day before meals  insulin lispro (HumaLOG) corrective regimen sliding scale   SubCutaneous at bedtime  insulin lispro Injectable (HumaLOG) 8 Unit(s) SubCutaneous three times a day before meals  levothyroxine 75 MICROGram(s) Oral daily  midodrine 30 milliGRAM(s) Oral three times a day  MIRACLE MOUTHWASH 30 milliLiter(s) Swish and Spit three times a day  Nephro-lynda 1 Tablet(s) Oral daily  polyethylene glycol 3350 17 Gram(s) Oral daily  Saliva Substitute (CAPHOSOL) 30 milliLiter(s) Swish and Spit every 6 hours  senna 2 Tablet(s) Oral at bedtime    VITALS:  Vital Signs Last 24 Hrs  T(C): 36.4 (17 Mar 2018 13:21), Max: 36.9 (17 Mar 2018 09:24)  T(F): 97.5 (17 Mar 2018 13:21), Max: 98.4 (17 Mar 2018 09:24)  HR: 86 (17 Mar 2018 15:10) (59 - 99)  BP: 115/55 (17 Mar 2018 13:59) (102/62 - 134/60)  BP(mean): --  RR: 18 (17 Mar 2018 13:21) (17 - 18)  SpO2: 96% (17 Mar 2018 15:08) (95% - 99%)      PHYSICAL EXAM:  Constitutional: NAD  HEENT: anicteric sclera, oropharynx clear, MMM  Neck: No JVD  Respiratory: Bibasilar crackles. Diminshed breath sounds.   Cardiovascular: S1, S2, RRR  Gastrointestinal: BS+, soft, NT/ND  Extremities: No cyanosis or clubbing. 3+ peripheral LE edema  Neurological: A/O x 3, no focal deficits  Psychiatric: Normal mood, normal affect  : No CVA tenderness. No rosa.   Skin: No rashes  Vascular Access: ZABRINA Kelley cath     LABS:  03-17    133<L>  |  90<L>  |  72<H>  ----------------------------<  193<H>  4.3   |  23  |  5.61<H>    Ca    8.2<L>      17 Mar 2018 09:12  Mg     2.0     03-17                          10.3   8.80  )-----------( 97       ( 17 Mar 2018 09:12 )             33.8       Urine Studies:      RADIOLOGY & ADDITIONAL STUDIES:

## 2018-03-17 NOTE — PROGRESS NOTE ADULT - PROBLEM SELECTOR PLAN 1
3/1: says his breathing is better: would cont to use steroids as well as bipap at night time: Cont oxygen  to keep sao2 above 88%:  3/2: doing reasonable: cont current care:  3/3: decrease  steroids to 15 mg a day  3/4: given risk of increasing retention of salt and water , and he has been on dobutamine: would cut it down to 10 mg tomorrow and slowly taper it to off  3/5: It is very hard to determine whether steroids are beneficially improving his SOB: I doubt that steroids are of much help here: They may also potentiate salt and water retention! Will taper them off!  3/6: today sister says she would like to continue steroids for sometime : would continue for now and taper slowly: May be send home on 10 mg of steroids:  3/7: doing ok : cont current care: Has CMP and is on Dobutamine Drip  3/8: on dobutamine  3/9: cont dobutamine and night time BiPAP  3/10: cont current treatment : breathing wise he remains tenuous:  3/11: on dobutamine:  3/12: o n dobutamine:  3/13: to cont dobutamine: Pt is on night time bipap: But he has not used it in last few days but would like to have one at home upon dc: May arrange for it : Pt encouraged to use the bipap daily:  3/14: doing ok : cont current treatment: finish steroids on 16th  3/15: doing same: SOB off and on :  3/19: pt uses bipap prn at night: yesterday she did not use it  3/17 stable.

## 2018-03-17 NOTE — PROGRESS NOTE ADULT - PROBLEM SELECTOR PLAN 6
monitor blood glucose: defer to primary care:  3/1: blood glucose is  uncontrolled: ? secondary to steroids: would defer to endo  3/2: still uncontrolled: defer to primary care:  3/6: Blood glucose in 200-300's: Defer to primary  3/9: Reasonable  3/13: Blood glucose controlled  3/15: blood glucose controlled  3/17 stable

## 2018-03-17 NOTE — PROGRESS NOTE ADULT - PROBLEM SELECTOR PLAN 2
2/28: started on a trial of prednisone two days ago, will monitor, however per his family, he never had a good response to previous trial of steroid.  3/1: on retrial of steroids: Pt thinks his breathing is better then before: Would like to cont steroids at this t alirio  3/2: cont steroids: pt seems to have stable SOB , does not seem to be improving further to me:  3/3: decrease steroids to 15 mg ad ay  3/4: on 15 mg today , decrease to 10 mg a day  3/5: taper off prednisone: already ordered  3*6: cont steroids  3/7: doing pretty poor: cont steroids trial for some time  3/8: on steroids at tis time: low dose steroids  3/9: It is difficult to discern if steroids are helpful in him: However the pt as well as the sister insists that we cont to try steroids: They think he feels a little better with it: Side effects have been explained to them:  3/10: cont 10 mg today too ? decrease to 5 mg soon  3/11: on steroids:  3/12: pt is on 5 mg of steroids: will taper  to off on next few days:  3/13: pt has been doing poorly: cont prednisone till 16 of March.  3/14: don't think steroids are helping much : will finish soon:  3/16: no significant benefit from steroids: DC steroids:   3/15:has extensive fibrosis: taper steroids to off tomorrow:  3/16 off steroid

## 2018-03-18 LAB
BASOPHILS # BLD AUTO: 0.03 K/UL — SIGNIFICANT CHANGE UP (ref 0–0.2)
BASOPHILS NFR BLD AUTO: 0.4 % — SIGNIFICANT CHANGE UP (ref 0–2)
BUN SERPL-MCNC: 51 MG/DL — HIGH (ref 7–23)
CALCIUM SERPL-MCNC: 8.7 MG/DL — SIGNIFICANT CHANGE UP (ref 8.4–10.5)
CHLORIDE SERPL-SCNC: 93 MMOL/L — LOW (ref 98–107)
CO2 SERPL-SCNC: 25 MMOL/L — SIGNIFICANT CHANGE UP (ref 22–31)
CREAT SERPL-MCNC: 4.09 MG/DL — HIGH (ref 0.5–1.3)
EOSINOPHIL # BLD AUTO: 0.18 K/UL — SIGNIFICANT CHANGE UP (ref 0–0.5)
EOSINOPHIL NFR BLD AUTO: 2.1 % — SIGNIFICANT CHANGE UP (ref 0–6)
GLUCOSE SERPL-MCNC: 107 MG/DL — HIGH (ref 70–99)
HCT VFR BLD CALC: 33.8 % — LOW (ref 39–50)
HGB BLD-MCNC: 10.8 G/DL — LOW (ref 13–17)
IMM GRANULOCYTES # BLD AUTO: 0.04 # — SIGNIFICANT CHANGE UP
IMM GRANULOCYTES NFR BLD AUTO: 0.5 % — SIGNIFICANT CHANGE UP (ref 0–1.5)
INR BLD: 2.85 — HIGH (ref 0.88–1.17)
LYMPHOCYTES # BLD AUTO: 0.87 K/UL — LOW (ref 1–3.3)
LYMPHOCYTES # BLD AUTO: 10.3 % — LOW (ref 13–44)
MAGNESIUM SERPL-MCNC: 1.9 MG/DL — SIGNIFICANT CHANGE UP (ref 1.6–2.6)
MCHC RBC-ENTMCNC: 31.3 PG — SIGNIFICANT CHANGE UP (ref 27–34)
MCHC RBC-ENTMCNC: 32 % — SIGNIFICANT CHANGE UP (ref 32–36)
MCV RBC AUTO: 98 FL — SIGNIFICANT CHANGE UP (ref 80–100)
MONOCYTES # BLD AUTO: 0.99 K/UL — HIGH (ref 0–0.9)
MONOCYTES NFR BLD AUTO: 11.8 % — SIGNIFICANT CHANGE UP (ref 2–14)
NEUTROPHILS # BLD AUTO: 6.31 K/UL — SIGNIFICANT CHANGE UP (ref 1.8–7.4)
NEUTROPHILS NFR BLD AUTO: 74.9 % — SIGNIFICANT CHANGE UP (ref 43–77)
NRBC # FLD: 0 — SIGNIFICANT CHANGE UP
PLATELET # BLD AUTO: 104 K/UL — LOW (ref 150–400)
PMV BLD: 13.7 FL — HIGH (ref 7–13)
POTASSIUM SERPL-MCNC: 3.6 MMOL/L — SIGNIFICANT CHANGE UP (ref 3.5–5.3)
POTASSIUM SERPL-SCNC: 3.6 MMOL/L — SIGNIFICANT CHANGE UP (ref 3.5–5.3)
PROTHROM AB SERPL-ACNC: 32.3 SEC — HIGH (ref 9.8–13.1)
RBC # BLD: 3.45 M/UL — LOW (ref 4.2–5.8)
RBC # FLD: 18.8 % — HIGH (ref 10.3–14.5)
SODIUM SERPL-SCNC: 135 MMOL/L — SIGNIFICANT CHANGE UP (ref 135–145)
WBC # BLD: 8.42 K/UL — SIGNIFICANT CHANGE UP (ref 3.8–10.5)
WBC # FLD AUTO: 8.42 K/UL — SIGNIFICANT CHANGE UP (ref 3.8–10.5)

## 2018-03-18 PROCEDURE — 99233 SBSQ HOSP IP/OBS HIGH 50: CPT

## 2018-03-18 RX ORDER — WARFARIN SODIUM 2.5 MG/1
3 TABLET ORAL ONCE
Qty: 0 | Refills: 0 | Status: COMPLETED | OUTPATIENT
Start: 2018-03-18 | End: 2018-03-18

## 2018-03-18 RX ADMIN — Medication 100 MILLIGRAM(S): at 06:07

## 2018-03-18 RX ADMIN — AMIODARONE HYDROCHLORIDE 100 MILLIGRAM(S): 400 TABLET ORAL at 06:07

## 2018-03-18 RX ADMIN — Medication 2 PUFF(S): at 21:40

## 2018-03-18 RX ADMIN — SENNA PLUS 2 TABLET(S): 8.6 TABLET ORAL at 21:40

## 2018-03-18 RX ADMIN — Medication 16.94 MICROGRAM(S)/KG/MIN: at 08:46

## 2018-03-18 RX ADMIN — Medication 3 MILLILITER(S): at 04:38

## 2018-03-18 RX ADMIN — Medication 16.94 MICROGRAM(S)/KG/MIN: at 21:40

## 2018-03-18 RX ADMIN — Medication 3: at 12:53

## 2018-03-18 RX ADMIN — Medication 75 MICROGRAM(S): at 06:07

## 2018-03-18 RX ADMIN — MIDODRINE HYDROCHLORIDE 30 MILLIGRAM(S): 2.5 TABLET ORAL at 21:40

## 2018-03-18 RX ADMIN — Medication 1 SPRAY(S): at 13:33

## 2018-03-18 RX ADMIN — Medication 81 MILLIGRAM(S): at 12:11

## 2018-03-18 RX ADMIN — DIPHENHYDRAMINE HYDROCHLORIDE AND LIDOCAINE HYDROCHLORIDE AND ALUMINUM HYDROXIDE AND MAGNESIUM HYDRO 30 MILLILITER(S): KIT at 06:08

## 2018-03-18 RX ADMIN — Medication 30 MILLILITER(S): at 06:07

## 2018-03-18 RX ADMIN — Medication 2 PUFF(S): at 08:47

## 2018-03-18 RX ADMIN — Medication 30 MILLILITER(S): at 12:12

## 2018-03-18 RX ADMIN — MIDODRINE HYDROCHLORIDE 30 MILLIGRAM(S): 2.5 TABLET ORAL at 06:07

## 2018-03-18 RX ADMIN — Medication 1 TABLET(S): at 12:11

## 2018-03-18 RX ADMIN — Medication 3 MILLILITER(S): at 15:23

## 2018-03-18 RX ADMIN — WARFARIN SODIUM 3 MILLIGRAM(S): 2.5 TABLET ORAL at 18:13

## 2018-03-18 RX ADMIN — FINASTERIDE 5 MILLIGRAM(S): 5 TABLET, FILM COATED ORAL at 12:11

## 2018-03-18 RX ADMIN — Medication 1: at 18:13

## 2018-03-18 RX ADMIN — Medication 30 MILLILITER(S): at 18:13

## 2018-03-18 RX ADMIN — Medication 3 MILLILITER(S): at 09:24

## 2018-03-18 RX ADMIN — DIPHENHYDRAMINE HYDROCHLORIDE AND LIDOCAINE HYDROCHLORIDE AND ALUMINUM HYDROXIDE AND MAGNESIUM HYDRO 30 MILLILITER(S): KIT at 23:29

## 2018-03-18 RX ADMIN — Medication 16.94 MICROGRAM(S)/KG/MIN: at 21:51

## 2018-03-18 RX ADMIN — Medication 30 MILLILITER(S): at 23:28

## 2018-03-18 RX ADMIN — ATORVASTATIN CALCIUM 80 MILLIGRAM(S): 80 TABLET, FILM COATED ORAL at 21:40

## 2018-03-18 RX ADMIN — MIDODRINE HYDROCHLORIDE 30 MILLIGRAM(S): 2.5 TABLET ORAL at 13:33

## 2018-03-18 RX ADMIN — INSULIN GLARGINE 20 UNIT(S): 100 INJECTION, SOLUTION SUBCUTANEOUS at 21:56

## 2018-03-18 NOTE — PROGRESS NOTE ADULT - PROBLEM SELECTOR PLAN 1
3/1: says his breathing is better: would cont to use steroids as well as bipap at night time: Cont oxygen  to keep sao2 above 88%:  3/2: doing reasonable: cont current care:  3/3: decrease  steroids to 15 mg a day  3/4: given risk of increasing retention of salt and water , and he has been on dobutamine: would cut it down to 10 mg tomorrow and slowly taper it to off  3/5: It is very hard to determine whether steroids are beneficially improving his SOB: I doubt that steroids are of much help here: They may also potentiate salt and water retention! Will taper them off!  3/6: today sister says she would like to continue steroids for sometime : would continue for now and taper slowly: May be send home on 10 mg of steroids:  3/7: doing ok : cont current care: Has CMP and is on Dobutamine Drip  3/8: on dobutamine  3/9: cont dobutamine and night time BiPAP  3/10: cont current treatment : breathing wise he remains tenuous:  3/11: on dobutamine:  3/12: o n dobutamine:  3/13: to cont dobutamine: Pt is on night time bipap: But he has not used it in last few days but would like to have one at home upon dc: May arrange for it : Pt encouraged to use the bipap daily:  3/14: doing ok : cont current treatment: finish steroids on 16th  3/15: doing same: SOB off and on :  3/19: pt uses bipap prn at night: yesterday she did not use it  3/17 stable.  3/18 stable. on room air. on Dobutamine gtt

## 2018-03-18 NOTE — PROGRESS NOTE ADULT - PROBLEM SELECTOR PLAN 2
2/28: started on a trial of prednisone two days ago, will monitor, however per his family, he never had a good response to previous trial of steroid.  3/1: on retrial of steroids: Pt thinks his breathing is better then before: Would like to cont steroids at this t alirio  3/2: cont steroids: pt seems to have stable SOB , does not seem to be improving further to me:  3/3: decrease steroids to 15 mg ad ay  3/4: on 15 mg today , decrease to 10 mg a day  3/5: taper off prednisone: already ordered  3*6: cont steroids  3/7: doing pretty poor: cont steroids trial for some time  3/8: on steroids at tis time: low dose steroids  3/9: It is difficult to discern if steroids are helpful in him: However the pt as well as the sister insists that we cont to try steroids: They think he feels a little better with it: Side effects have been explained to them:  3/10: cont 10 mg today too ? decrease to 5 mg soon  3/11: on steroids:  3/12: pt is on 5 mg of steroids: will taper  to off on next few days:  3/13: pt has been doing poorly: cont prednisone till 16 of March.  3/14: don't think steroids are helping much : will finish soon:  3/16: no significant benefit from steroids: DC steroids:   3/15:has extensive fibrosis: taper steroids to off tomorrow:  3/16 off steroid  3/18 monitor.

## 2018-03-18 NOTE — PROGRESS NOTE ADULT - PROBLEM SELECTOR PLAN 3
2/28: cont steroids to see how he responds to it in next few days  3/1: cont steroids as well as BD  3/2: cont steroids  3/3:major issue is pulmonary fibrosis: cont current care:  3/14: cont current care:  3/5: can change pulmicort to symbicort:  3/6: cont low dose steroids  3/7: on steroids  3/8: steoids taper  3/9: Cont 10 mg of steroids at this time  3/10: cont 10 mg , will change to 5 mg soon  3/11: no wheezing: today : michael change to po steroids twice day : till her MARGIE is done tomorrow:  3/12: no wheezing: cont BD : taper steroids to off  3/13: Cont Pulmicort: doubt he will be able to use Symbicort properly:  3/14: cont pulmicort as well as bronchodilators:  3/15: Cont bronchodilators:  3/17 Pulmicort, Duoneb  3/18 stable with current management. continue current management

## 2018-03-18 NOTE — PROGRESS NOTE ADULT - SUBJECTIVE AND OBJECTIVE BOX
Patient is a 62y old  Male who presents with a chief complaint of SOB x few hours (20 Feb 2018 18:05)  doing ok, OOB to chair. SOB (+) but alleviated. no cough, no sputum     Pertinent ROS:     MEDICATIONS  (STANDING):  ALBUTerol/ipratropium for Nebulization 3 milliLiter(s) Nebulizer every 6 hours  amiodarone    Tablet 100 milliGRAM(s) Oral daily  aspirin enteric coated 81 milliGRAM(s) Oral daily  atorvastatin 80 milliGRAM(s) Oral at bedtime  buDESOnide   90 MICROgram(s) Inhaler 2 Puff(s) Inhalation two times a day  dextrose 5%. 1000 milliLiter(s) (50 mL/Hr) IV Continuous <Continuous>  dextrose 5%. 1000 milliLiter(s) (50 mL/Hr) IV Continuous <Continuous>  dextrose 50% Injectable 12.5 Gram(s) IV Push once  dextrose 50% Injectable 25 Gram(s) IV Push once  dextrose 50% Injectable 25 Gram(s) IV Push once  DOBUTamine Infusion 7.5 MICROgram(s)/kG/Min (16.942 mL/Hr) IV Continuous <Continuous>  docusate sodium 100 milliGRAM(s) Oral two times a day  finasteride 5 milliGRAM(s) Oral daily  influenza   Vaccine 0.5 milliLiter(s) IntraMuscular once  insulin glargine Injectable (LANTUS) 20 Unit(s) SubCutaneous at bedtime  insulin lispro (HumaLOG) corrective regimen sliding scale   SubCutaneous three times a day before meals  insulin lispro (HumaLOG) corrective regimen sliding scale   SubCutaneous at bedtime  levothyroxine 75 MICROGram(s) Oral daily  midodrine 30 milliGRAM(s) Oral three times a day  MIRACLE MOUTHWASH 30 milliLiter(s) Swish and Spit three times a day  Nephro-lynda 1 Tablet(s) Oral daily  polyethylene glycol 3350 17 Gram(s) Oral daily  Saliva Substitute (CAPHOSOL) 30 milliLiter(s) Swish and Spit every 6 hours  senna 2 Tablet(s) Oral at bedtime    MEDICATIONS  (PRN):  benzocaine 15 mG/menthol 3.6 mG Lozenge 1 Lozenge Oral every 6 hours PRN Sore Throat  dextrose Gel 1 Dose(s) Oral once PRN Blood Glucose LESS THAN 70 milliGRAM(s)/deciLiter  dextrose Gel 1 Dose(s) Oral once PRN Blood Glucose LESS THAN 70 milliGRAM(s)/deciliter  glucagon  Injectable 1 milliGRAM(s) IntraMuscular once PRN Glucose <70 milliGRAM(s)/deciLiter  guaiFENesin    Syrup 100 milliGRAM(s) Oral every 6 hours PRN Cough  guaiFENesin   Syrup  (Sugar-Free) 200 milliGRAM(s) Oral every 6 hours PRN Cough  sodium chloride 0.65% Nasal 1 Spray(s) Both Nostrils every 2 hours PRN Nasal Congestion    Vital Signs Last 24 Hrs  T(C): 36.3 (18 Mar 2018 10:00), Max: 36.4 (17 Mar 2018 13:21)  T(F): 97.3 (18 Mar 2018 10:00), Max: 97.5 (17 Mar 2018 13:21)  HR: 88 (18 Mar 2018 10:00) (64 - 99)  BP: 100/57 (18 Mar 2018 10:00) (100/57 - 134/60)  BP(mean): --  RR: 18 (18 Mar 2018 10:00) (17 - 18)  SpO2: 96% (18 Mar 2018 10:00) (93% - 99%)    I&O's Summary    17 Mar 2018 07:01  -  18 Mar 2018 07:00  --------------------------------------------------------  IN: 1552.8 mL / OUT: 3500 mL / NET: -1947.2 mL        Physical Exam:   General: NAD, well developed, well nourished.   Eyes: PERRL, EOM intact; conjunctiva and sclera clear  Head: Normocephalic, atraumatic  ENMT: No nasal discharge, airway clear  Neck: Supple, non tender,  no masses, no JVD  Respiratory: right posterior basilar crackles otherwise clear to auscultations.  Cardiovascular: irregular rate and rhythm, (+) murmurs.  Gastrointestinal: Soft non-tender non-distended, positive bowel sounds  Extremities: Normal range of motion, no clubbing, cyanosis. (++) bilateral lower extremity edema  Vascular: Peripheral pulses palpable 2+ bilaterally  Neurological: Alert and oriented x3, follows commands, answers questions appropriately.   Skin: Warm and dry. No obvious rash  Lymph Nodes: No acute cervical or supraclavicular adenopathy  Musculoskeletal: kyphosis ( - ), Move all extremities,   Psychiatric: Cooperative and appropriate mood      Labs:                    10.8   8.42  )-----------( 104      ( 18 Mar 2018 06:20 )             33.8     03-18    135  |  93<L>  |  51<H>  ----------------------------<  107<H>  3.6   |  25  |  4.09<H>    Ca    8.7      18 Mar 2018 06:20  Mg     1.9     03-18      CAPILLARY BLOOD GLUCOSE      POCT Blood Glucose.: 83 mg/dL (18 Mar 2018 08:52)  POCT Blood Glucose.: 210 mg/dL (17 Mar 2018 20:45)  POCT Blood Glucose.: 264 mg/dL (17 Mar 2018 17:53)  POCT Blood Glucose.: 90 mg/dL (17 Mar 2018 14:02)      PT/INR - ( 18 Mar 2018 06:20 )   PT: 32.3 SEC;   INR: 2.85          D DImer  Cultures:     Studies  Chest X-RAY  < from: Xray Chest 1 View- PORTABLE-Urgent (02.26.18 @ 11:27) >    EXAM:  XR CHEST PORTABLE URGENT 1V        PROCEDURE DATE:  Feb 26 2018         INTERPRETATION:  CLINICAL INFORMATION: Shortness of breath.   Cardiomyopathy.    TECHNIQUE: AP chest radiograph    COMPARISON: Chest radiograph performed 2/17/2018. CT abdomen and pelvis   performed 10/30/2017.    FINDINGS:    A right-sided dialysis catheter terminates over SVC. There is a   left-sided cardiac device. There is redemonstration of diffuse   interstitial and reticular opacities bilaterally, mildly improved since   2/17/2018. There are small bilateral pleural effusions.    IMPRESSION:    Persistent diffuse interstitial and reticular lung opacities bilaterally,   mildly improved since 2/17/2018, which may represent pulmonary edema   superimposed on patient's known interstitial lung disease.    Small bilateral pleural effusions.    < end of copied text >    CT SCAN Chest   < from: CT Chest No Cont (06.05.17 @ 15:45) >  EXAM:  CT CHEST        PROCEDURE DATE:  Jun 5 2017         INTERPRETATION:  CLINICAL INFORMATION: End-stage renal disease and   congestive heart failure. Rule out pneumonia.    PROCEDURE:   CT of the Chest was performed without intravenous contrast.   Intravenous contrast: None.    Reconstructions were performed in axial thin, axial maximum intensity   projection, sagittal and coronal planes.    COMPARISON: Chest x-ray 6/2/2017.    FINDINGS:    A right IJ hemodialysis catheter terminates at the SVC/right atrial   junction.    LUNGS AND LARGE AIRWAYS: Small amount of secretions in the trachea.   Diffuse bilateral groundglass opacities with traction bronchiectasis   throughout the lungs with regions of polygonal sparing bilateral lower   andleft upper lobes. There is honeycombing scattered throughout the   lungs. Interlobular septal thickening is noted in the bilateral upper   lobes. Patchy tree-in-bud opacities are noted in the bilateral lower   lobes. Scattered small calcified granulomas.  PLEURA: Trace bilateral pleural effusions.  VESSELS: Atherosclerotic calcifications of the aorta and coronary   arteries.  HEART: Cardiomegaly. No pericardial effusion.  MEDIASTINUM AND ASH: No lymphadenopathy.  CHEST WALL AND LOWER NECK: Left chest wall AICD. Generalized anasarca.  UPPER ABDOMEN: Hyperdense material within the gallbladder may reflect   vicarious excretion of iodinated contrast from recent procedure.  BONES: Degenerative changes of the spine.    IMPRESSION:    Extensive pulmonary fibrosis.    Evidence of superimposed mild small airway disease.    < end of copied text >    CT Abdomen  Venous Dopplers: LE:     Others  < from: Transthoracic Echocardiogram (10.18.17 @ 09:48) >  Patient name: Patrice Plascencia  YOB: 1955   Age: 62 (M)   MR#: [id number]  Study Date: 10/18/2017  [address]: Mery Garcia  Study quality: Technically good  Referring Physician: Channing Elena MD  Blood Pressure: 81/46 mmHg  Height: 180 cm  Weight: 79 kg  BSA: 2 m2  ------------------------------------------------------------------------  PROCEDURE: Transthoracic echocardiogram with 2-D, M-Mode  and complete spectral and color flow Doppler.  Patient was injected with 10 cc's of aerosolized saline.  INDICATION: Unspecified atrial fibrillation (I48.91),  Unspecified atrial flutter (I48.92)  ------------------------------------------------------------------------  DIMENSIONS:  Dimensions:     Normal Values:  LA:     5.4 cm    2.0 - 4.0 cm  Ao:     2.4 cm    2.0 - 3.8 cm  SEPTUM: 0.7 cm    0.6 - 1.2 cm  PWT:    1.2 cm    0.6 - 1.1 cm  LVIDd:  6.2 cm    3.0 - 5.6 cm  LVIDs:  5.6 cm    1.8 - 4.0 cm  Derived Variables:  LVMI: 123 g/m2  RWT: 0.38  Fractional short: 10 %  Ejection Fraction (Teicholtz): 21 %  ------------------------------------------------------------------------  OBSERVATIONS:  Mitral Valve: Normal mitral valve. Mild mitral  regurgitation.  Aortic Root: Normal aortic root.  Aortic Valve: Normal trileaflet aortic valve.  Left Atrium: Severely dilated left atrium.  LA volume index  = 56 cc/m2.  Left Ventricle: Severe global left ventricular systolic  dysfunction. Flattening of the interventricular septum in  both systole and diastole is  consistent with right  ventricular pressure overload. Moderate left ventricular  enlargement.  Right Heart: Moderate right atrial enlargement. Right  ventricular enlargement with decreased right ventricular  systolic function. A device wire is noted in the right  heart. Normal tricuspid valve.  Moderate tricuspid  regurgitation. Normal pulmonic valve. Mild pulmonic  regurgitation.  Pericardium/PleuraNormal pericardium with trace pericardial  effusion.  Hemodynamic: Estimated right ventricular systolic pressure  equals 55 mm Hg, assuming right atrial pressure equals 10  mm Hg, consistent with moderate pulmonary hypertension.  Agitated saline injection resulted in a large amount of  bubbles seen in the right heart. Although the exact amount  of cardiac cycles that occurred prior to the crossing of  the bubbles is unclear, the findings are suggestive of a  significant shunt. Consider MARGIE for further workup, if  clinically warrnated.  ------------------------------------------------------------------------  CONCLUSIONS:  1. Normal trileaflet aortic valve.  2. Severely dilated left atrium.  LA volume index = 56  cc/m2.  3. Moderate left ventricular enlargement.  4. Severe global left ventricular systolic dysfunction.  Flattening of the interventricular septum in both systole  and diastole is  consistent with right ventricular pressure  overload.  5. Right ventricular enlargement with decreased right  ventricular systolic function. A device wire is noted in  the right heart.  6. Normal tricuspid valve.  Moderate tricuspid  regurgitation.  7. Normal pulmonic valve. Mild pulmonic regurgitation.  Estimated pulmonary artery systolic pressure equals 55 mm  Hg, assuming right atrial pressure equals 10  mm Hg,  consistent with moderate pulmonary hypertension.  8. Agitated saline injection resulted in a large amount of  bubbles seen in the right heart. Although the exact amount  of cardiac cycles that occurred prior to the crossing of  the bubbles is unclear, the findings are suggestive of a  significant shunt. Consider MARGIE for further workup, if  clinically warrnated.  ----------------------------------------------    < end of copied text >

## 2018-03-18 NOTE — PROGRESS NOTE ADULT - PROBLEM SELECTOR PLAN 2
- Pt remains severely volume overloaded and there is no end point to adequate removal of his volume.  We have extensively discussed this with pt and he wants to stay in the hospital as long as it takes.  Pt required daily HD/PUF this week, which is not feasible as an outpatient.    - On fixed dose dobutamine infusion - HD/UF per renal.  Discussed with renal (Dr. Ridley), who has attempted additional sessions of UF/HD as outpatient, but pt ultimately bounces back to hospital.    - As per cardio, no indication for TTE with bubble for R to L shunt eval because patient is not candidate for possible PFO repair at this time.  - Pt unable to take BB or ACE/ARB as he is chronically hypotensive on Midodrine  - I spoke with HF attending Dr. De Souza, who knows pt well from previous admissions.  Unfortunately no additional therapy can be offered for the patient and he is currently receiving everything that can possibly be offered including HD and dobutamine gtt.  - Pt with no evidence of excess fluid intake or dietary indiscretion.

## 2018-03-18 NOTE — PROGRESS NOTE ADULT - SUBJECTIVE AND OBJECTIVE BOX
Patient is a 62y old  Male who presents with a chief complaint of SOB x few hours (20 Feb 2018 18:05)        SUBJECTIVE / OVERNIGHT EVENTS: Receiving breathing treatment, asking when he can go home.  Understands that he received daily HD/PUF this past week, which we can't accommodate as outpatient.       MEDICATIONS  (STANDING):  ALBUTerol/ipratropium for Nebulization 3 milliLiter(s) Nebulizer every 6 hours  amiodarone    Tablet 100 milliGRAM(s) Oral daily  aspirin enteric coated 81 milliGRAM(s) Oral daily  atorvastatin 80 milliGRAM(s) Oral at bedtime  buDESOnide   90 MICROgram(s) Inhaler 2 Puff(s) Inhalation two times a day  dextrose 5%. 1000 milliLiter(s) (50 mL/Hr) IV Continuous <Continuous>  dextrose 5%. 1000 milliLiter(s) (50 mL/Hr) IV Continuous <Continuous>  dextrose 50% Injectable 12.5 Gram(s) IV Push once  dextrose 50% Injectable 25 Gram(s) IV Push once  dextrose 50% Injectable 25 Gram(s) IV Push once  DOBUTamine Infusion 7.5 MICROgram(s)/kG/Min (16.942 mL/Hr) IV Continuous <Continuous>  docusate sodium 100 milliGRAM(s) Oral two times a day  finasteride 5 milliGRAM(s) Oral daily  influenza   Vaccine 0.5 milliLiter(s) IntraMuscular once  insulin glargine Injectable (LANTUS) 20 Unit(s) SubCutaneous at bedtime  insulin lispro (HumaLOG) corrective regimen sliding scale   SubCutaneous three times a day before meals  insulin lispro (HumaLOG) corrective regimen sliding scale   SubCutaneous at bedtime  levothyroxine 75 MICROGram(s) Oral daily  midodrine 30 milliGRAM(s) Oral three times a day  MIRACLE MOUTHWASH 30 milliLiter(s) Swish and Spit three times a day  Nephro-lynda 1 Tablet(s) Oral daily  polyethylene glycol 3350 17 Gram(s) Oral daily  Saliva Substitute (CAPHOSOL) 30 milliLiter(s) Swish and Spit every 6 hours  senna 2 Tablet(s) Oral at bedtime    MEDICATIONS  (PRN):  benzocaine 15 mG/menthol 3.6 mG Lozenge 1 Lozenge Oral every 6 hours PRN Sore Throat  dextrose Gel 1 Dose(s) Oral once PRN Blood Glucose LESS THAN 70 milliGRAM(s)/deciLiter  dextrose Gel 1 Dose(s) Oral once PRN Blood Glucose LESS THAN 70 milliGRAM(s)/deciliter  glucagon  Injectable 1 milliGRAM(s) IntraMuscular once PRN Glucose <70 milliGRAM(s)/deciLiter  guaiFENesin    Syrup 100 milliGRAM(s) Oral every 6 hours PRN Cough  guaiFENesin   Syrup  (Sugar-Free) 200 milliGRAM(s) Oral every 6 hours PRN Cough  sodium chloride 0.65% Nasal 1 Spray(s) Both Nostrils every 2 hours PRN Nasal Congestion      Vital Signs Last 24 Hrs  T(C): 36.3 (18 Mar 2018 10:00), Max: 36.4 (17 Mar 2018 13:21)  T(F): 97.3 (18 Mar 2018 10:00), Max: 97.5 (17 Mar 2018 13:21)  HR: 79 (18 Mar 2018 12:25) (64 - 99)  BP: 100/57 (18 Mar 2018 10:00) (100/57 - 134/60)  BP(mean): --  RR: 18 (18 Mar 2018 10:00) (17 - 18)  SpO2: 97% (18 Mar 2018 12:25) (93% - 99%)  CAPILLARY BLOOD GLUCOSE      POCT Blood Glucose.: 83 mg/dL (18 Mar 2018 08:52)  POCT Blood Glucose.: 210 mg/dL (17 Mar 2018 20:45)  POCT Blood Glucose.: 264 mg/dL (17 Mar 2018 17:53)  POCT Blood Glucose.: 90 mg/dL (17 Mar 2018 14:02)    I&O's Summary    17 Mar 2018 07:01  -  18 Mar 2018 07:00  --------------------------------------------------------  IN: 1552.8 mL / OUT: 3500 mL / NET: -1947.2 mL        PHYSICAL EXAM  GENERAL: Mild respiratory distress, well-developed  HEAD:  Atraumatic, Normocephalic  EYES: EOMI, PERRLA, conjunctiva and sclera clear  NECK: Supple, No JVD  CHEST/LUNG: Crackles in R base > L base  HEART: Regular rate and rhythm; No murmurs, rubs, or gallops  ABDOMEN: Soft, Nontender, Nondistended; Bowel sounds present  EXTREMITIES:  3+ pitting edema, unchanged for the past 2 weeks   PSYCH: AAOx3  SKIN: No rashes or lesions    LABS:                        10.8   8.42  )-----------( 104      ( 18 Mar 2018 06:20 )             33.8     03-18    135  |  93<L>  |  51<H>  ----------------------------<  107<H>  3.6   |  25  |  4.09<H>    Ca    8.7      18 Mar 2018 06:20  Mg     1.9     03-18      PT/INR - ( 18 Mar 2018 06:20 )   PT: 32.3 SEC;   INR: 2.85                  Consultant(s) Notes Reviewed:  Pulm, renal

## 2018-03-19 LAB
BASOPHILS # BLD AUTO: 0.04 K/UL — SIGNIFICANT CHANGE UP (ref 0–0.2)
BASOPHILS NFR BLD AUTO: 0.5 % — SIGNIFICANT CHANGE UP (ref 0–2)
BUN SERPL-MCNC: 68 MG/DL — HIGH (ref 7–23)
CALCIUM SERPL-MCNC: 8.7 MG/DL — SIGNIFICANT CHANGE UP (ref 8.4–10.5)
CHLORIDE SERPL-SCNC: 94 MMOL/L — LOW (ref 98–107)
CO2 SERPL-SCNC: 25 MMOL/L — SIGNIFICANT CHANGE UP (ref 22–31)
CREAT SERPL-MCNC: 5.09 MG/DL — HIGH (ref 0.5–1.3)
EOSINOPHIL # BLD AUTO: 0.17 K/UL — SIGNIFICANT CHANGE UP (ref 0–0.5)
EOSINOPHIL NFR BLD AUTO: 2.2 % — SIGNIFICANT CHANGE UP (ref 0–6)
GLUCOSE BLDC GLUCOMTR-MCNC: 155 MG/DL — HIGH (ref 70–99)
GLUCOSE BLDC GLUCOMTR-MCNC: 205 MG/DL — HIGH (ref 70–99)
GLUCOSE BLDC GLUCOMTR-MCNC: 69 MG/DL — LOW (ref 70–99)
GLUCOSE BLDC GLUCOMTR-MCNC: 70 MG/DL — SIGNIFICANT CHANGE UP (ref 70–99)
GLUCOSE BLDC GLUCOMTR-MCNC: 92 MG/DL — SIGNIFICANT CHANGE UP (ref 70–99)
GLUCOSE SERPL-MCNC: 60 MG/DL — LOW (ref 70–99)
HBV SURFACE AG SER-ACNC: NEGATIVE — SIGNIFICANT CHANGE UP
HCT VFR BLD CALC: 35.2 % — LOW (ref 39–50)
HGB BLD-MCNC: 11.2 G/DL — LOW (ref 13–17)
IMM GRANULOCYTES # BLD AUTO: 0.02 # — SIGNIFICANT CHANGE UP
IMM GRANULOCYTES NFR BLD AUTO: 0.3 % — SIGNIFICANT CHANGE UP (ref 0–1.5)
INR BLD: 2.88 — HIGH (ref 0.88–1.17)
LYMPHOCYTES # BLD AUTO: 1.03 K/UL — SIGNIFICANT CHANGE UP (ref 1–3.3)
LYMPHOCYTES # BLD AUTO: 13.3 % — SIGNIFICANT CHANGE UP (ref 13–44)
MAGNESIUM SERPL-MCNC: 1.9 MG/DL — SIGNIFICANT CHANGE UP (ref 1.6–2.6)
MCHC RBC-ENTMCNC: 31.4 PG — SIGNIFICANT CHANGE UP (ref 27–34)
MCHC RBC-ENTMCNC: 31.8 % — LOW (ref 32–36)
MCV RBC AUTO: 98.6 FL — SIGNIFICANT CHANGE UP (ref 80–100)
MONOCYTES # BLD AUTO: 0.89 K/UL — SIGNIFICANT CHANGE UP (ref 0–0.9)
MONOCYTES NFR BLD AUTO: 11.5 % — SIGNIFICANT CHANGE UP (ref 2–14)
NEUTROPHILS # BLD AUTO: 5.58 K/UL — SIGNIFICANT CHANGE UP (ref 1.8–7.4)
NEUTROPHILS NFR BLD AUTO: 72.2 % — SIGNIFICANT CHANGE UP (ref 43–77)
NRBC # FLD: 0 — SIGNIFICANT CHANGE UP
PLATELET # BLD AUTO: 100 K/UL — LOW (ref 150–400)
PMV BLD: 13.4 FL — HIGH (ref 7–13)
POTASSIUM SERPL-MCNC: 3.8 MMOL/L — SIGNIFICANT CHANGE UP (ref 3.5–5.3)
POTASSIUM SERPL-SCNC: 3.8 MMOL/L — SIGNIFICANT CHANGE UP (ref 3.5–5.3)
PROTHROM AB SERPL-ACNC: 33.9 SEC — HIGH (ref 9.8–13.1)
RBC # BLD: 3.57 M/UL — LOW (ref 4.2–5.8)
RBC # FLD: 18.3 % — HIGH (ref 10.3–14.5)
SODIUM SERPL-SCNC: 138 MMOL/L — SIGNIFICANT CHANGE UP (ref 135–145)
WBC # BLD: 7.73 K/UL — SIGNIFICANT CHANGE UP (ref 3.8–10.5)
WBC # FLD AUTO: 7.73 K/UL — SIGNIFICANT CHANGE UP (ref 3.8–10.5)

## 2018-03-19 PROCEDURE — 99497 ADVNCD CARE PLAN 30 MIN: CPT

## 2018-03-19 PROCEDURE — 99233 SBSQ HOSP IP/OBS HIGH 50: CPT | Mod: GC

## 2018-03-19 RX ORDER — WARFARIN SODIUM 2.5 MG/1
3 TABLET ORAL ONCE
Qty: 0 | Refills: 0 | Status: COMPLETED | OUTPATIENT
Start: 2018-03-19 | End: 2018-03-19

## 2018-03-19 RX ADMIN — AMIODARONE HYDROCHLORIDE 100 MILLIGRAM(S): 400 TABLET ORAL at 05:25

## 2018-03-19 RX ADMIN — DIPHENHYDRAMINE HYDROCHLORIDE AND LIDOCAINE HYDROCHLORIDE AND ALUMINUM HYDROXIDE AND MAGNESIUM HYDRO 30 MILLILITER(S): KIT at 05:26

## 2018-03-19 RX ADMIN — Medication 3 MILLILITER(S): at 04:48

## 2018-03-19 RX ADMIN — DIPHENHYDRAMINE HYDROCHLORIDE AND LIDOCAINE HYDROCHLORIDE AND ALUMINUM HYDROXIDE AND MAGNESIUM HYDRO 30 MILLILITER(S): KIT at 13:21

## 2018-03-19 RX ADMIN — WARFARIN SODIUM 3 MILLIGRAM(S): 2.5 TABLET ORAL at 23:09

## 2018-03-19 RX ADMIN — Medication 16.94 MICROGRAM(S)/KG/MIN: at 07:28

## 2018-03-19 RX ADMIN — DIPHENHYDRAMINE HYDROCHLORIDE AND LIDOCAINE HYDROCHLORIDE AND ALUMINUM HYDROXIDE AND MAGNESIUM HYDRO 30 MILLILITER(S): KIT at 22:20

## 2018-03-19 RX ADMIN — MIDODRINE HYDROCHLORIDE 30 MILLIGRAM(S): 2.5 TABLET ORAL at 13:21

## 2018-03-19 RX ADMIN — ATORVASTATIN CALCIUM 80 MILLIGRAM(S): 80 TABLET, FILM COATED ORAL at 22:19

## 2018-03-19 RX ADMIN — Medication 81 MILLIGRAM(S): at 11:28

## 2018-03-19 RX ADMIN — MIDODRINE HYDROCHLORIDE 30 MILLIGRAM(S): 2.5 TABLET ORAL at 22:19

## 2018-03-19 RX ADMIN — INSULIN GLARGINE 20 UNIT(S): 100 INJECTION, SOLUTION SUBCUTANEOUS at 23:09

## 2018-03-19 RX ADMIN — Medication 2 PUFF(S): at 10:59

## 2018-03-19 RX ADMIN — Medication 2: at 13:21

## 2018-03-19 RX ADMIN — Medication 3 MILLILITER(S): at 00:00

## 2018-03-19 RX ADMIN — Medication 75 MICROGRAM(S): at 05:25

## 2018-03-19 RX ADMIN — Medication 30 MILLILITER(S): at 05:25

## 2018-03-19 RX ADMIN — MIDODRINE HYDROCHLORIDE 30 MILLIGRAM(S): 2.5 TABLET ORAL at 05:25

## 2018-03-19 RX ADMIN — Medication 3 MILLILITER(S): at 15:05

## 2018-03-19 RX ADMIN — Medication 1 TABLET(S): at 11:28

## 2018-03-19 RX ADMIN — Medication 2 PUFF(S): at 22:20

## 2018-03-19 RX ADMIN — FINASTERIDE 5 MILLIGRAM(S): 5 TABLET, FILM COATED ORAL at 11:28

## 2018-03-19 RX ADMIN — Medication 3 MILLILITER(S): at 09:55

## 2018-03-19 RX ADMIN — Medication 100 MILLIGRAM(S): at 05:25

## 2018-03-19 RX ADMIN — Medication 30 MILLILITER(S): at 11:37

## 2018-03-19 NOTE — PROGRESS NOTE ADULT - PROBLEM SELECTOR PLAN 1
had isolated UF treatment 3/16, last HD 3/17   outpt HD schedule- MWF  scheduled for HD today to switch to outpt schedule, w/2k bath, 3kg UF as tolerated, low bath temp   watch BP, c/w midodrine  c/w renal diet, fluid restriction 1.2L/day, reinforced w/pt  ok to d/c renal stand point after hd.  chronic volume overload, will provide extra PUF prn, outpt based on availability.   end stage heart failure and pulm fibrosis. Pt resistant to withdrawing any care at this time.

## 2018-03-19 NOTE — PROGRESS NOTE ADULT - PROBLEM SELECTOR PLAN 7
Inotropy per cardiology team.  on dobutamine and midodrine  3/1: on dobutamine  3/3: cont current care by primary team  3/4: on dobutaime:  3/5: On Midodrine and Dobutamine::  3/11: on dobutamine still  3/15: cont midodrine as wella s dobutamine:  3/16: cont current care:  3/17 on Midodrine  3/19: on midodrine as well as dobutamine

## 2018-03-19 NOTE — PROGRESS NOTE ADULT - PROBLEM SELECTOR PLAN 1
3/1: says his breathing is better: would cont to use steroids as well as bipap at night time: Cont oxygen  to keep sao2 above 88%:  3/2: doing reasonable: cont current care:  3/3: decrease  steroids to 15 mg a day  3/4: given risk of increasing retention of salt and water , and he has been on dobutamine: would cut it down to 10 mg tomorrow and slowly taper it to off  3/5: It is very hard to determine whether steroids are beneficially improving his SOB: I doubt that steroids are of much help here: They may also potentiate salt and water retention! Will taper them off!  3/6: today sister says she would like to continue steroids for sometime : would continue for now and taper slowly: May be send home on 10 mg of steroids:  3/7: doing ok : cont current care: Has CMP and is on Dobutamine Drip  3/8: on dobutamine  3/9: cont dobutamine and night time BiPAP  3/10: cont current treatment : breathing wise he remains tenuous:  3/11: on dobutamine:  3/12: o n dobutamine:  3/13: to cont dobutamine: Pt is on night time bipap: But he has not used it in last few days but would like to have one at home upon dc: May arrange for it : Pt encouraged to use the bipap daily:  3/14: doing ok : cont current treatment: finish steroids on 16th  3/15: doing same: SOB off and on :  3/19: pt uses bipap prn at night: yesterday she did not use it  3/17 stable.  3/18 stable. on room air. on Dobutamine gtt  3/19: cont current care: overall prognosis remains poor: he is getting maximal therapy for CHF as well as supportive treatment for pulmonary fibrosis:

## 2018-03-19 NOTE — PROGRESS NOTE ADULT - PROBLEM SELECTOR PLAN 4
- FS overall controlled  - Continue with Lantus 20U QHS, premeal insulin discontinued as FSG readings are lower now that pt is off steroids.

## 2018-03-19 NOTE — PROGRESS NOTE ADULT - PROBLEM SELECTOR PLAN 8
- Pt wishes to remain full code; states that even if he's dependent on machines, he wants to do everything to stay alive.  - Very grave prognosis, pt may decompensate this admission.  Family fully aware of pt's decision to remain full code.  - I am trying to get patient to a point where he can be discharged safely and only require up to 3-4 sessions of HD.  This week he has received HD/UF daily, which is NOT feasible as an outpatient. - Pt wishes to continue with all aggressive measures; states that even if he's dependent on machines, he wants to do everything to stay alive.  - Very grave prognosis, pt may decompensate this admission.  Family fully aware of pt's decisions. Time spent on face to face advanced care planning with patient 35 min   - I am trying to get patient to a point where he can be discharged safely and only require up to 3-4 sessions of HD.  This week he has received HD/UF daily, which is NOT feasible as an outpatient.

## 2018-03-19 NOTE — PROGRESS NOTE ADULT - SUBJECTIVE AND OBJECTIVE BOX
Patient is a 62y old  Male who presents with a chief complaint of SOB x few hours (20 Feb 2018 18:05)      Any change in ROS: remains off an don SOB despite maximal therapy:  Now off steroids     MEDICATIONS  (STANDING):  ALBUTerol/ipratropium for Nebulization 3 milliLiter(s) Nebulizer every 6 hours  amiodarone    Tablet 100 milliGRAM(s) Oral daily  aspirin enteric coated 81 milliGRAM(s) Oral daily  atorvastatin 80 milliGRAM(s) Oral at bedtime  buDESOnide   90 MICROgram(s) Inhaler 2 Puff(s) Inhalation two times a day  dextrose 5%. 1000 milliLiter(s) (50 mL/Hr) IV Continuous <Continuous>  dextrose 5%. 1000 milliLiter(s) (50 mL/Hr) IV Continuous <Continuous>  dextrose 50% Injectable 12.5 Gram(s) IV Push once  dextrose 50% Injectable 25 Gram(s) IV Push once  dextrose 50% Injectable 25 Gram(s) IV Push once  DOBUTamine Infusion 7.5 MICROgram(s)/kG/Min (16.942 mL/Hr) IV Continuous <Continuous>  docusate sodium 100 milliGRAM(s) Oral two times a day  finasteride 5 milliGRAM(s) Oral daily  influenza   Vaccine 0.5 milliLiter(s) IntraMuscular once  insulin glargine Injectable (LANTUS) 20 Unit(s) SubCutaneous at bedtime  insulin lispro (HumaLOG) corrective regimen sliding scale   SubCutaneous three times a day before meals  insulin lispro (HumaLOG) corrective regimen sliding scale   SubCutaneous at bedtime  levothyroxine 75 MICROGram(s) Oral daily  midodrine 30 milliGRAM(s) Oral three times a day  MIRACLE MOUTHWASH 30 milliLiter(s) Swish and Spit three times a day  Nephro-lynda 1 Tablet(s) Oral daily  polyethylene glycol 3350 17 Gram(s) Oral daily  Saliva Substitute (CAPHOSOL) 30 milliLiter(s) Swish and Spit every 6 hours  senna 2 Tablet(s) Oral at bedtime  warfarin 3 milliGRAM(s) Oral once    MEDICATIONS  (PRN):  benzocaine 15 mG/menthol 3.6 mG Lozenge 1 Lozenge Oral every 6 hours PRN Sore Throat  dextrose Gel 1 Dose(s) Oral once PRN Blood Glucose LESS THAN 70 milliGRAM(s)/deciLiter  dextrose Gel 1 Dose(s) Oral once PRN Blood Glucose LESS THAN 70 milliGRAM(s)/deciliter  glucagon  Injectable 1 milliGRAM(s) IntraMuscular once PRN Glucose <70 milliGRAM(s)/deciLiter  guaiFENesin    Syrup 100 milliGRAM(s) Oral every 6 hours PRN Cough  guaiFENesin   Syrup  (Sugar-Free) 200 milliGRAM(s) Oral every 6 hours PRN Cough  sodium chloride 0.65% Nasal 1 Spray(s) Both Nostrils every 2 hours PRN Nasal Congestion    Vital Signs Last 24 Hrs  T(C): 36.3 (19 Mar 2018 16:39), Max: 36.7 (19 Mar 2018 10:32)  T(F): 97.4 (19 Mar 2018 16:39), Max: 98 (19 Mar 2018 10:32)  HR: 92 (19 Mar 2018 16:39) (70 - 95)  BP: 98/55 (19 Mar 2018 16:39) (98/55 - 116/73)  BP(mean): --  RR: 18 (19 Mar 2018 16:39) (17 - 19)  SpO2: 90% (19 Mar 2018 15:06) (89% - 98%)    I&O's Summary    18 Mar 2018 07:01  -  19 Mar 2018 07:00  --------------------------------------------------------  IN: 680 mL / OUT: 0 mL / NET: 680 mL          Physical Exam:   GENERAL: NAD, well-groomed, well-developed  HEENT: BELL/   Atraumatic, Normocephalic  ENMT: No tonsillar erythema, exudates, or enlargement; Moist mucous membranes, Good dentition, No lesions  NECK: Supple, No JVD, Normal thyroid  CHEST/LUNG: Crackles bilaterally   CVS: Regular rate and rhythm; No murmurs, rubs, or gallops  GI: : Soft, Nontender, Nondistended; Bowel sounds present  NERVOUS SYSTEM:  Alert & Oriented X3, Good concentration; Motor Strength 5/5 B/L upper and lower extremities; DTRs 2+ intact and symmetric  EXTREMITIES:  2+ edema  LYMPH: No lymphadenopathy noted  SKIN: No rashes or lesions  ENDOCRINOLOGY: No Thyromegaly  PSYCH: Appropriate    Labs:                              11.2   7.73  )-----------( 100      ( 19 Mar 2018 05:40 )             35.2                         10.8   8.42  )-----------( 104      ( 18 Mar 2018 06:20 )             33.8                         10.3   8.80  )-----------( 97       ( 17 Mar 2018 09:12 )             33.8                         11.1   9.83  )-----------( 106      ( 16 Mar 2018 06:05 )             34.7     03-19    138  |  94<L>  |  68<H>  ----------------------------<  60<L>  3.8   |  25  |  5.09<H>  03-18    135  |  93<L>  |  51<H>  ----------------------------<  107<H>  3.6   |  25  |  4.09<H>  03-17    133<L>  |  90<L>  |  72<H>  ----------------------------<  193<H>  4.3   |  23  |  5.61<H>  03-16    137  |  96<L>  |  52<H>  ----------------------------<  83  3.8   |  24  |  4.27<H>    Ca    8.7      19 Mar 2018 05:40  Ca    8.7      18 Mar 2018 06:20  Mg     1.9     03-19  Mg     1.9     03-18      CAPILLARY BLOOD GLUCOSE      POCT Blood Glucose.: 205 mg/dL (19 Mar 2018 12:20)  POCT Blood Glucose.: 122 mg/dL (19 Mar 2018 08:59)  POCT Blood Glucose.: 182 mg/dL (18 Mar 2018 21:47)  POCT Blood Glucose.: 197 mg/dL (18 Mar 2018 17:57)        PT/INR - ( 19 Mar 2018 05:40 )   PT: 33.9 SEC;   INR: 2.88              Cultures:             < from: Xray Chest 1 View- PORTABLE-Urgent (02.26.18 @ 11:27) >  NTERPRETATION:  CLINICAL INFORMATION: Shortness of breath.   Cardiomyopathy.    TECHNIQUE: AP chest radiograph    COMPARISON: Chest radiograph performed 2/17/2018. CT abdomen and pelvis   performed 10/30/2017.    FINDINGS:    A right-sided dialysis catheter terminates over SVC. There is a   left-sided cardiac device. There is redemonstration of diffuse   interstitial and reticular opacities bilaterally, mildly improved since   2/17/2018. There are small bilateral pleural effusions.    IMPRESSION:    Persistent diffuse interstitial and reticular lung opacities bilaterally,   mildly improved since 2/17/2018, which may represent pulmonary edema   superimposed on patient's known interstitial lung disease.    Small bilateral pleural effusions.              ANNIA MURPHY M.D., RADIOLOGY RESIDENT  This document has been electronically signed.  NITIN MAHARAJ M.D. ATTENDING RADIOLOGIST  This document has been electronically signed. Feb 26 2018  2:54PM        < end of copied text >                  Studies  Chest X-RAY  CT SCAN Chest   Venous Dopplers: LE:   CT Abdomen  Others

## 2018-03-19 NOTE — PROGRESS NOTE ADULT - PROBLEM SELECTOR PLAN 4
hd as per renal  keep net negative as able  3/1: on HD  3/17 HD  3/4: on HD  3/9: cont HD per renal  3/11: on HD  3/12: on HD  3/14: Cont HD  3/15: On HD  3/19: Cont HD

## 2018-03-19 NOTE — PROGRESS NOTE ADULT - PROBLEM SELECTOR PLAN 1
- Multifactorial due to advanced COPD, pulmonary fibrosis and end stage heart failure.  No additional means of therapy will reverse his chronic illnesses at this point.  - Supplemental oxygen to maintain O2 sat >88% s/p completion of prolonged steroid taper  - BiPAP QHS

## 2018-03-19 NOTE — PROGRESS NOTE ADULT - SUBJECTIVE AND OBJECTIVE BOX
Pt seen and examined at bedside    feeling ok  sob stable, on NC02  no cp    Allergies:  No Known Allergies    Hospital Medications:   MEDICATIONS  (STANDING):  ALBUTerol/ipratropium for Nebulization 3 milliLiter(s) Nebulizer every 6 hours  amiodarone    Tablet 100 milliGRAM(s) Oral daily  aspirin enteric coated 81 milliGRAM(s) Oral daily  atorvastatin 80 milliGRAM(s) Oral at bedtime  buDESOnide   90 MICROgram(s) Inhaler 2 Puff(s) Inhalation two times a day  dextrose 5%. 1000 milliLiter(s) (50 mL/Hr) IV Continuous <Continuous>  dextrose 5%. 1000 milliLiter(s) (50 mL/Hr) IV Continuous <Continuous>  dextrose 50% Injectable 12.5 Gram(s) IV Push once  dextrose 50% Injectable 25 Gram(s) IV Push once  dextrose 50% Injectable 25 Gram(s) IV Push once  DOBUTamine Infusion 7.5 MICROgram(s)/kG/Min (16.942 mL/Hr) IV Continuous <Continuous>  docusate sodium 100 milliGRAM(s) Oral two times a day  finasteride 5 milliGRAM(s) Oral daily  influenza   Vaccine 0.5 milliLiter(s) IntraMuscular once  insulin glargine Injectable (LANTUS) 20 Unit(s) SubCutaneous at bedtime  insulin lispro (HumaLOG) corrective regimen sliding scale   SubCutaneous three times a day before meals  insulin lispro (HumaLOG) corrective regimen sliding scale   SubCutaneous at bedtime  insulin lispro Injectable (HumaLOG) 8 Unit(s) SubCutaneous three times a day before meals  levothyroxine 75 MICROGram(s) Oral daily  midodrine 30 milliGRAM(s) Oral three times a day  MIRACLE MOUTHWASH 30 milliLiter(s) Swish and Spit three times a day  Nephro-lynda 1 Tablet(s) Oral daily  polyethylene glycol 3350 17 Gram(s) Oral daily  Saliva Substitute (CAPHOSOL) 30 milliLiter(s) Swish and Spit every 6 hours  senna 2 Tablet(s) Oral at bedtime    VITALS:  Vital Signs Last 24 Hrs  T(C): 36.7 (19 Mar 2018 10:32), Max: 36.7 (19 Mar 2018 10:32)  T(F): 98 (19 Mar 2018 10:32), Max: 98 (19 Mar 2018 10:32)  HR: 82 (19 Mar 2018 10:54) (70 - 95)  BP: 102/73 (19 Mar 2018 10:32) (97/61 - 116/73)  BP(mean): --  RR: 17 (19 Mar 2018 10:32) (17 - 20)  SpO2: 95% (19 Mar 2018 10:54) (89% - 98%)      PHYSICAL EXAM:  Constitutional: NAD  HEENT: anicteric sclera, oropharynx clear, MMM  Neck: No JVD  Respiratory: Bibasilar crackles. Diminshed breath sounds.   Cardiovascular: S1, S2, RRR  Gastrointestinal: BS+, soft, NT/ND  Extremities: No cyanosis or clubbing. 3+ peripheral LE edema  Neurological: A/O x 3, no focal deficits  Psychiatric: Normal mood, normal affect  : No CVA tenderness. No rosa.   Skin: No rashes  Vascular Access: ZABRINA Kelley cath     LABS:  03-19    138  |  94<L>  |  68<H>  ----------------------------<  60<L>  3.8   |  25  |  5.09<H>    Ca    8.7      19 Mar 2018 05:40  Mg     1.9     03-19                          11.2   7.73  )-----------( 100      ( 19 Mar 2018 05:40 )             35.2       Urine Studies:      RADIOLOGY & ADDITIONAL STUDIES:

## 2018-03-19 NOTE — PROGRESS NOTE ADULT - PROBLEM SELECTOR PLAN 3
2/28: cont steroids to see how he responds to it in next few days  3/1: cont steroids as well as BD  3/2: cont steroids  3/3:major issue is pulmonary fibrosis: cont current care:  3/14: cont current care:  3/5: can change pulmicort to symbicort:  3/6: cont low dose steroids  3/7: on steroids  3/8: steoids taper  3/9: Cont 10 mg of steroids at this time  3/10: cont 10 mg , will change to 5 mg soon  3/11: no wheezing: today : michael change to po steroids twice day : till her MARGIE is done tomorrow:  3/12: no wheezing: cont BD : taper steroids to off  3/13: Cont Pulmicort: doubt he will be able to use Symbicort properly:  3/14: cont pulmicort as well as bronchodilators:  3/15: Cont bronchodilators:  3/17 Pulmicort, Duoneb  3/18 stable with current management. continue current management  3/19: on pulmicort as well as nebs

## 2018-03-19 NOTE — PROGRESS NOTE ADULT - PROBLEM SELECTOR PLAN 2
- Pt remains severely volume overloaded and there is no end point to adequate removal of his volume.  We have extensively discussed this with pt and he wants to stay in the hospital as long as it takes.  Pt required daily HD/PUF past week, which is not feasible as an outpatient. last HD 3/17, for HD today, will reassess to determine if able to transition to home HD schedule. DW renal   - On fixed dose dobutamine infusion - HD/UF per renal.  Discussed with renal who has attempted additional sessions of UF/HD as outpatient, but pt ultimately bounces back to hospital.    - As per cardio, no indication for TTE with bubble for R to L shunt eval because patient is not candidate for possible PFO repair at this time.  - Pt unable to take BB or ACE/ARB as he is chronically hypotensive on Midodrine  - I spoke with HF attending, who knows pt well from previous admissions.  Unfortunately no additional therapy can be offered for the patient and he is currently receiving everything that can possibly be offered including HD and dobutamine gtt.  - Pt with no evidence of excess fluid intake or dietary indiscretion.

## 2018-03-19 NOTE — PROGRESS NOTE ADULT - SUBJECTIVE AND OBJECTIVE BOX
Patient is a 62y old  Male who presents with a chief complaint of SOB x few hours (20 Feb 2018 18:05)      SUBJECTIVE / OVERNIGHT EVENTS:    ROS:  No HA/DZ  No Vision changes   No CP, SOB  No N/V/D  No Edema  No Rash  NO weakness, numbness    MEDICATIONS  (STANDING):  ALBUTerol/ipratropium for Nebulization 3 milliLiter(s) Nebulizer every 6 hours  amiodarone    Tablet 100 milliGRAM(s) Oral daily  aspirin enteric coated 81 milliGRAM(s) Oral daily  atorvastatin 80 milliGRAM(s) Oral at bedtime  buDESOnide   90 MICROgram(s) Inhaler 2 Puff(s) Inhalation two times a day  dextrose 5%. 1000 milliLiter(s) (50 mL/Hr) IV Continuous <Continuous>  dextrose 5%. 1000 milliLiter(s) (50 mL/Hr) IV Continuous <Continuous>  dextrose 50% Injectable 12.5 Gram(s) IV Push once  dextrose 50% Injectable 25 Gram(s) IV Push once  dextrose 50% Injectable 25 Gram(s) IV Push once  DOBUTamine Infusion 7.5 MICROgram(s)/kG/Min (16.942 mL/Hr) IV Continuous <Continuous>  docusate sodium 100 milliGRAM(s) Oral two times a day  finasteride 5 milliGRAM(s) Oral daily  influenza   Vaccine 0.5 milliLiter(s) IntraMuscular once  insulin glargine Injectable (LANTUS) 20 Unit(s) SubCutaneous at bedtime  insulin lispro (HumaLOG) corrective regimen sliding scale   SubCutaneous three times a day before meals  insulin lispro (HumaLOG) corrective regimen sliding scale   SubCutaneous at bedtime  levothyroxine 75 MICROGram(s) Oral daily  midodrine 30 milliGRAM(s) Oral three times a day  MIRACLE MOUTHWASH 30 milliLiter(s) Swish and Spit three times a day  Nephro-lynda 1 Tablet(s) Oral daily  polyethylene glycol 3350 17 Gram(s) Oral daily  Saliva Substitute (CAPHOSOL) 30 milliLiter(s) Swish and Spit every 6 hours  senna 2 Tablet(s) Oral at bedtime  warfarin 3 milliGRAM(s) Oral once    MEDICATIONS  (PRN):  benzocaine 15 mG/menthol 3.6 mG Lozenge 1 Lozenge Oral every 6 hours PRN Sore Throat  dextrose Gel 1 Dose(s) Oral once PRN Blood Glucose LESS THAN 70 milliGRAM(s)/deciLiter  dextrose Gel 1 Dose(s) Oral once PRN Blood Glucose LESS THAN 70 milliGRAM(s)/deciliter  glucagon  Injectable 1 milliGRAM(s) IntraMuscular once PRN Glucose <70 milliGRAM(s)/deciLiter  guaiFENesin    Syrup 100 milliGRAM(s) Oral every 6 hours PRN Cough  guaiFENesin   Syrup  (Sugar-Free) 200 milliGRAM(s) Oral every 6 hours PRN Cough  sodium chloride 0.65% Nasal 1 Spray(s) Both Nostrils every 2 hours PRN Nasal Congestion      T(C): 36.3 (03-19-18 @ 14:17)  HR: 83 (03-19-18 @ 14:17)  BP: 104/61 (03-19-18 @ 14:17)  RR: 17 (03-19-18 @ 14:17)  SpO2: 95% (03-19-18 @ 14:17)  CAPILLARY BLOOD GLUCOSE      POCT Blood Glucose.: 205 mg/dL (19 Mar 2018 12:20)  POCT Blood Glucose.: 122 mg/dL (19 Mar 2018 08:59)  POCT Blood Glucose.: 182 mg/dL (18 Mar 2018 21:47)  POCT Blood Glucose.: 197 mg/dL (18 Mar 2018 17:57)    I&O's Summary    18 Mar 2018 07:01  -  19 Mar 2018 07:00  --------------------------------------------------------  IN: 680 mL / OUT: 0 mL / NET: 680 mL        PHYSICAL EXAM:  GENERAL: NAD, well-developed, AOx3  HEAD:  Atraumatic, Normocephalic  EYES: EOMI, PERRL, conjunctiva and sclera clear  NECK: Supple, No JVD  CHEST/LUNG: Clear to auscultation bilaterally  HEART: Regular rate and rhythm; No murmurs, rubs, or gallops, No Edema  ABDOMEN: Soft, Nontender, Nondistended; Bowel sounds present  EXTREMITIES:  2+ Peripheral Pulses, No clubbing, cyanosis  PSYCH: No SI/HI  NEUROLOGY: non-focal  SKIN: No rashes or lesions    LABS:                        11.2   7.73  )-----------( 100      ( 19 Mar 2018 05:40 )             35.2     03-19    138  |  94<L>  |  68<H>  ----------------------------<  60<L>  3.8   |  25  |  5.09<H>    Ca    8.7      19 Mar 2018 05:40  Mg     1.9     03-19      PT/INR - ( 19 Mar 2018 05:40 )   PT: 33.9 SEC;   INR: 2.88                        RADIOLOGY & ADDITIONAL TESTS:    Imaging Personally Reviewed:    Consultant(s) Notes Reviewed:      Care Discussed with Consultants/Other Providers: Patient is a 62y old  Male who presents with a chief complaint of SOB x few hours (20 Feb 2018 18:05)      SUBJECTIVE / OVERNIGHT EVENTS: weekend events noted. appears SOB, though has no complaints this morning     ROS:  No HA/DZ  No Vision changes   No CP, SOB  No N/V/D  No Edema  No Rash  NO weakness, numbness    MEDICATIONS  (STANDING):  ALBUTerol/ipratropium for Nebulization 3 milliLiter(s) Nebulizer every 6 hours  amiodarone    Tablet 100 milliGRAM(s) Oral daily  aspirin enteric coated 81 milliGRAM(s) Oral daily  atorvastatin 80 milliGRAM(s) Oral at bedtime  buDESOnide   90 MICROgram(s) Inhaler 2 Puff(s) Inhalation two times a day  dextrose 5%. 1000 milliLiter(s) (50 mL/Hr) IV Continuous <Continuous>  dextrose 5%. 1000 milliLiter(s) (50 mL/Hr) IV Continuous <Continuous>  dextrose 50% Injectable 12.5 Gram(s) IV Push once  dextrose 50% Injectable 25 Gram(s) IV Push once  dextrose 50% Injectable 25 Gram(s) IV Push once  DOBUTamine Infusion 7.5 MICROgram(s)/kG/Min (16.942 mL/Hr) IV Continuous <Continuous>  docusate sodium 100 milliGRAM(s) Oral two times a day  finasteride 5 milliGRAM(s) Oral daily  influenza   Vaccine 0.5 milliLiter(s) IntraMuscular once  insulin glargine Injectable (LANTUS) 20 Unit(s) SubCutaneous at bedtime  insulin lispro (HumaLOG) corrective regimen sliding scale   SubCutaneous three times a day before meals  insulin lispro (HumaLOG) corrective regimen sliding scale   SubCutaneous at bedtime  levothyroxine 75 MICROGram(s) Oral daily  midodrine 30 milliGRAM(s) Oral three times a day  MIRACLE MOUTHWASH 30 milliLiter(s) Swish and Spit three times a day  Nephro-lynda 1 Tablet(s) Oral daily  polyethylene glycol 3350 17 Gram(s) Oral daily  Saliva Substitute (CAPHOSOL) 30 milliLiter(s) Swish and Spit every 6 hours  senna 2 Tablet(s) Oral at bedtime  warfarin 3 milliGRAM(s) Oral once    MEDICATIONS  (PRN):  benzocaine 15 mG/menthol 3.6 mG Lozenge 1 Lozenge Oral every 6 hours PRN Sore Throat  dextrose Gel 1 Dose(s) Oral once PRN Blood Glucose LESS THAN 70 milliGRAM(s)/deciLiter  dextrose Gel 1 Dose(s) Oral once PRN Blood Glucose LESS THAN 70 milliGRAM(s)/deciliter  glucagon  Injectable 1 milliGRAM(s) IntraMuscular once PRN Glucose <70 milliGRAM(s)/deciLiter  guaiFENesin    Syrup 100 milliGRAM(s) Oral every 6 hours PRN Cough  guaiFENesin   Syrup  (Sugar-Free) 200 milliGRAM(s) Oral every 6 hours PRN Cough  sodium chloride 0.65% Nasal 1 Spray(s) Both Nostrils every 2 hours PRN Nasal Congestion      T(C): 36.3 (03-19-18 @ 14:17)  HR: 83 (03-19-18 @ 14:17)  BP: 104/61 (03-19-18 @ 14:17)  RR: 17 (03-19-18 @ 14:17)  SpO2: 95% (03-19-18 @ 14:17)  CAPILLARY BLOOD GLUCOSE      POCT Blood Glucose.: 205 mg/dL (19 Mar 2018 12:20)  POCT Blood Glucose.: 122 mg/dL (19 Mar 2018 08:59)  POCT Blood Glucose.: 182 mg/dL (18 Mar 2018 21:47)  POCT Blood Glucose.: 197 mg/dL (18 Mar 2018 17:57)    I&O's Summary    18 Mar 2018 07:01  -  19 Mar 2018 07:00  --------------------------------------------------------  IN: 680 mL / OUT: 0 mL / NET: 680 mL        PHYSICAL EXAM:  GENERAL: NAD, well-developed, AOx3  HEAD:  Atraumatic, Normocephalic  EYES: EOMI, PERRL, conjunctiva and sclera clear  NECK: Supple, + JVD  CHEST/LUNG: LB rhonchi   HEART: Regular rate and rhythm; No murmurs, rubs, or gallops, 2-3+ pitting edema  ABDOMEN: Soft, Nontender, Nondistended; Bowel sounds present  EXTREMITIES:  2+ Peripheral Pulses, No clubbing, cyanosis, LUE PIcc  PSYCH: No SI/HI  NEUROLOGY: non-focal  SKIN: No rashes or lesions, R CW HD catheter     LABS:                        11.2   7.73  )-----------( 100      ( 19 Mar 2018 05:40 )             35.2     03-19    138  |  94<L>  |  68<H>  ----------------------------<  60<L>  3.8   |  25  |  5.09<H>    Ca    8.7      19 Mar 2018 05:40  Mg     1.9     03-19      PT/INR - ( 19 Mar 2018 05:40 )   PT: 33.9 SEC;   INR: 2.88                        RADIOLOGY & ADDITIONAL TESTS:    Imaging Personally Reviewed:    Consultant(s) Notes Reviewed:      Care Discussed with Consultants/Other Providers: renal, Dr Flores

## 2018-03-19 NOTE — PROGRESS NOTE ADULT - PROBLEM SELECTOR PLAN 2
2/28: started on a trial of prednisone two days ago, will monitor, however per his family, he never had a good response to previous trial of steroid.  3/1: on retrial of steroids: Pt thinks his breathing is better then before: Would like to cont steroids at this t alirio  3/2: cont steroids: pt seems to have stable SOB , does not seem to be improving further to me:  3/3: decrease steroids to 15 mg ad ay  3/4: on 15 mg today , decrease to 10 mg a day  3/5: taper off prednisone: already ordered  3*6: cont steroids  3/7: doing pretty poor: cont steroids trial for some time  3/8: on steroids at tis time: low dose steroids  3/9: It is difficult to discern if steroids are helpful in him: However the pt as well as the sister insists that we cont to try steroids: They think he feels a little better with it: Side effects have been explained to them:  3/10: cont 10 mg today too ? decrease to 5 mg soon  3/11: on steroids:  3/12: pt is on 5 mg of steroids: will taper  to off on next few days:  3/13: pt has been doing poorly: cont prednisone till 16 of March.  3/14: don't think steroids are helping much : will finish soon:  3/16: no significant benefit from steroids: DC steroids:   3/15:has extensive fibrosis: taper steroids to off tomorrow:  3/16 off steroid  3/18 monitor.  3/19: Off steroids

## 2018-03-19 NOTE — PROGRESS NOTE ADULT - PROBLEM SELECTOR PLAN 5
HR controlled  ac as per cards/medicine  management as per cards  3/1: INR is therapeutic:  3/3: INR is therapeutic  3/5: INR therapeutic::  3/9: INR is therapeutic:  3/11: INR is low:  3/12: 1.70  3/13: HR is controlled:: On AC per primary/Cardiology  3/14: HR seems to be controlled: on AC  3/15: Cont AC  3/16: Stable: on AC  3/17 on AC  3/19: HR controlled: on AC

## 2018-03-20 LAB
APTT BLD: 51.8 SEC — HIGH (ref 27.5–37.4)
BUN SERPL-MCNC: 41 MG/DL — HIGH (ref 7–23)
CALCIUM SERPL-MCNC: 8.6 MG/DL — SIGNIFICANT CHANGE UP (ref 8.4–10.5)
CHLORIDE SERPL-SCNC: 96 MMOL/L — LOW (ref 98–107)
CO2 SERPL-SCNC: 27 MMOL/L — SIGNIFICANT CHANGE UP (ref 22–31)
CREAT SERPL-MCNC: 3.6 MG/DL — HIGH (ref 0.5–1.3)
GLUCOSE BLDC GLUCOMTR-MCNC: 115 MG/DL — HIGH (ref 70–99)
GLUCOSE BLDC GLUCOMTR-MCNC: 139 MG/DL — HIGH (ref 70–99)
GLUCOSE BLDC GLUCOMTR-MCNC: 183 MG/DL — HIGH (ref 70–99)
GLUCOSE BLDC GLUCOMTR-MCNC: 188 MG/DL — HIGH (ref 70–99)
GLUCOSE SERPL-MCNC: 115 MG/DL — HIGH (ref 70–99)
HCT VFR BLD CALC: 35.7 % — LOW (ref 39–50)
HGB BLD-MCNC: 11 G/DL — LOW (ref 13–17)
INR BLD: 3.11 — HIGH (ref 0.88–1.17)
MAGNESIUM SERPL-MCNC: 2.6 MG/DL — SIGNIFICANT CHANGE UP (ref 1.6–2.6)
MCHC RBC-ENTMCNC: 30.3 PG — SIGNIFICANT CHANGE UP (ref 27–34)
MCHC RBC-ENTMCNC: 30.8 % — LOW (ref 32–36)
MCV RBC AUTO: 98.3 FL — SIGNIFICANT CHANGE UP (ref 80–100)
NRBC # FLD: 0 — SIGNIFICANT CHANGE UP
PLATELET # BLD AUTO: 104 K/UL — LOW (ref 150–400)
PMV BLD: 12.9 FL — SIGNIFICANT CHANGE UP (ref 7–13)
POTASSIUM SERPL-MCNC: 3.6 MMOL/L — SIGNIFICANT CHANGE UP (ref 3.5–5.3)
POTASSIUM SERPL-SCNC: 3.6 MMOL/L — SIGNIFICANT CHANGE UP (ref 3.5–5.3)
PROTHROM AB SERPL-ACNC: 35.3 SEC — HIGH (ref 9.8–13.1)
RBC # BLD: 3.63 M/UL — LOW (ref 4.2–5.8)
RBC # FLD: 18.5 % — HIGH (ref 10.3–14.5)
SODIUM SERPL-SCNC: 138 MMOL/L — SIGNIFICANT CHANGE UP (ref 135–145)
WBC # BLD: 8.87 K/UL — SIGNIFICANT CHANGE UP (ref 3.8–10.5)
WBC # FLD AUTO: 8.87 K/UL — SIGNIFICANT CHANGE UP (ref 3.8–10.5)

## 2018-03-20 PROCEDURE — 99233 SBSQ HOSP IP/OBS HIGH 50: CPT

## 2018-03-20 RX ADMIN — MIDODRINE HYDROCHLORIDE 30 MILLIGRAM(S): 2.5 TABLET ORAL at 21:57

## 2018-03-20 RX ADMIN — Medication 30 MILLILITER(S): at 13:01

## 2018-03-20 RX ADMIN — Medication 75 MICROGRAM(S): at 04:58

## 2018-03-20 RX ADMIN — Medication 100 MILLIGRAM(S): at 04:58

## 2018-03-20 RX ADMIN — Medication 1: at 18:24

## 2018-03-20 RX ADMIN — Medication 16.94 MICROGRAM(S)/KG/MIN: at 21:56

## 2018-03-20 RX ADMIN — ATORVASTATIN CALCIUM 80 MILLIGRAM(S): 80 TABLET, FILM COATED ORAL at 21:57

## 2018-03-20 RX ADMIN — Medication 3 MILLILITER(S): at 15:07

## 2018-03-20 RX ADMIN — AMIODARONE HYDROCHLORIDE 100 MILLIGRAM(S): 400 TABLET ORAL at 04:58

## 2018-03-20 RX ADMIN — MIDODRINE HYDROCHLORIDE 30 MILLIGRAM(S): 2.5 TABLET ORAL at 13:01

## 2018-03-20 RX ADMIN — DIPHENHYDRAMINE HYDROCHLORIDE AND LIDOCAINE HYDROCHLORIDE AND ALUMINUM HYDROXIDE AND MAGNESIUM HYDRO 30 MILLILITER(S): KIT at 13:03

## 2018-03-20 RX ADMIN — Medication 16.94 MICROGRAM(S)/KG/MIN: at 07:24

## 2018-03-20 RX ADMIN — Medication 1 TABLET(S): at 13:01

## 2018-03-20 RX ADMIN — Medication 81 MILLIGRAM(S): at 13:01

## 2018-03-20 RX ADMIN — Medication 3 MILLILITER(S): at 03:46

## 2018-03-20 RX ADMIN — FINASTERIDE 5 MILLIGRAM(S): 5 TABLET, FILM COATED ORAL at 13:01

## 2018-03-20 RX ADMIN — MIDODRINE HYDROCHLORIDE 30 MILLIGRAM(S): 2.5 TABLET ORAL at 04:59

## 2018-03-20 RX ADMIN — DIPHENHYDRAMINE HYDROCHLORIDE AND LIDOCAINE HYDROCHLORIDE AND ALUMINUM HYDROXIDE AND MAGNESIUM HYDRO 30 MILLILITER(S): KIT at 22:01

## 2018-03-20 RX ADMIN — INSULIN GLARGINE 20 UNIT(S): 100 INJECTION, SOLUTION SUBCUTANEOUS at 21:58

## 2018-03-20 RX ADMIN — Medication 3 MILLILITER(S): at 10:03

## 2018-03-20 RX ADMIN — DIPHENHYDRAMINE HYDROCHLORIDE AND LIDOCAINE HYDROCHLORIDE AND ALUMINUM HYDROXIDE AND MAGNESIUM HYDRO 30 MILLILITER(S): KIT at 04:59

## 2018-03-20 RX ADMIN — SENNA PLUS 2 TABLET(S): 8.6 TABLET ORAL at 21:57

## 2018-03-20 RX ADMIN — Medication 2 PUFF(S): at 11:54

## 2018-03-20 RX ADMIN — Medication 100 MILLIGRAM(S): at 18:24

## 2018-03-20 RX ADMIN — Medication 3 MILLILITER(S): at 22:07

## 2018-03-20 RX ADMIN — Medication 30 MILLILITER(S): at 18:24

## 2018-03-20 RX ADMIN — Medication 30 MILLILITER(S): at 04:59

## 2018-03-20 RX ADMIN — Medication 2 PUFF(S): at 21:57

## 2018-03-20 RX ADMIN — Medication 30 MILLILITER(S): at 00:21

## 2018-03-20 NOTE — PROGRESS NOTE ADULT - SUBJECTIVE AND OBJECTIVE BOX
Patient is a 62y old  Male who presents with a chief complaint of SOB x few hours (20 Feb 2018 18:05)      Any change in ROS: pt is doing ok  doing same:      MEDICATIONS  (STANDING):  ALBUTerol/ipratropium for Nebulization 3 milliLiter(s) Nebulizer every 6 hours  amiodarone    Tablet 100 milliGRAM(s) Oral daily  aspirin enteric coated 81 milliGRAM(s) Oral daily  atorvastatin 80 milliGRAM(s) Oral at bedtime  buDESOnide   90 MICROgram(s) Inhaler 2 Puff(s) Inhalation two times a day  dextrose 5%. 1000 milliLiter(s) (50 mL/Hr) IV Continuous <Continuous>  dextrose 5%. 1000 milliLiter(s) (50 mL/Hr) IV Continuous <Continuous>  dextrose 50% Injectable 12.5 Gram(s) IV Push once  dextrose 50% Injectable 25 Gram(s) IV Push once  dextrose 50% Injectable 25 Gram(s) IV Push once  DOBUTamine Infusion 7.5 MICROgram(s)/kG/Min (16.942 mL/Hr) IV Continuous <Continuous>  docusate sodium 100 milliGRAM(s) Oral two times a day  finasteride 5 milliGRAM(s) Oral daily  influenza   Vaccine 0.5 milliLiter(s) IntraMuscular once  insulin glargine Injectable (LANTUS) 20 Unit(s) SubCutaneous at bedtime  insulin lispro (HumaLOG) corrective regimen sliding scale   SubCutaneous three times a day before meals  insulin lispro (HumaLOG) corrective regimen sliding scale   SubCutaneous at bedtime  levothyroxine 75 MICROGram(s) Oral daily  midodrine 30 milliGRAM(s) Oral three times a day  MIRACLE MOUTHWASH 30 milliLiter(s) Swish and Spit three times a day  Nephro-lynda 1 Tablet(s) Oral daily  polyethylene glycol 3350 17 Gram(s) Oral daily  Saliva Substitute (CAPHOSOL) 30 milliLiter(s) Swish and Spit every 6 hours  senna 2 Tablet(s) Oral at bedtime    MEDICATIONS  (PRN):  benzocaine 15 mG/menthol 3.6 mG Lozenge 1 Lozenge Oral every 6 hours PRN Sore Throat  dextrose Gel 1 Dose(s) Oral once PRN Blood Glucose LESS THAN 70 milliGRAM(s)/deciLiter  dextrose Gel 1 Dose(s) Oral once PRN Blood Glucose LESS THAN 70 milliGRAM(s)/deciliter  glucagon  Injectable 1 milliGRAM(s) IntraMuscular once PRN Glucose <70 milliGRAM(s)/deciLiter  guaiFENesin    Syrup 100 milliGRAM(s) Oral every 6 hours PRN Cough  guaiFENesin   Syrup  (Sugar-Free) 200 milliGRAM(s) Oral every 6 hours PRN Cough  sodium chloride 0.65% Nasal 1 Spray(s) Both Nostrils every 2 hours PRN Nasal Congestion    Vital Signs Last 24 Hrs  T(C): 36.6 (20 Mar 2018 16:05), Max: 36.9 (20 Mar 2018 02:15)  T(F): 97.8 (20 Mar 2018 16:05), Max: 98.4 (20 Mar 2018 02:15)  HR: 81 (20 Mar 2018 16:05) (76 - 90)  BP: 93/59 (20 Mar 2018 16:05) (93/59 - 140/63)  BP(mean): --  RR: 17 (20 Mar 2018 16:05) (17 - 18)  SpO2: 90% (20 Mar 2018 16:05) (90% - 96%)    I&O's Summary    19 Mar 2018 07:01  -  20 Mar 2018 07:00  --------------------------------------------------------  IN: 400 mL / OUT: 3400 mL / NET: -3000 mL          Physical Exam:   GENERAL: NAD, well-groomed, well-developed  HEENT: BELL/   Atraumatic, Normocephalic  ENMT: No tonsillar erythema, exudates, or enlargement; Moist mucous membranes, Good dentition, No lesions  NECK: Supple, No JVD, Normal thyroid  CHEST/LUNG: scattered crackles   CVS: Regular rate and rhythm; No murmurs, rubs, or gallops  GI: : Soft, Nontender, Nondistended; Bowel sounds present  NERVOUS SYSTEM:  Alert & Oriented X3  EXTREMITIES:  2+ edema  LYMPH: No lymphadenopathy noted  SKIN: No rashes or lesions  ENDOCRINOLOGY: No Thyromegaly  PSYCH: Appropriate    Labs:                              11.0   8.87  )-----------( 104      ( 20 Mar 2018 05:25 )             35.7                         11.2   7.73  )-----------( 100      ( 19 Mar 2018 05:40 )             35.2                         10.8   8.42  )-----------( 104      ( 18 Mar 2018 06:20 )             33.8                         10.3   8.80  )-----------( 97       ( 17 Mar 2018 09:12 )             33.8     03-20    138  |  96<L>  |  41<H>  ----------------------------<  115<H>  3.6   |  27  |  3.60<H>  03-19    138  |  94<L>  |  68<H>  ----------------------------<  60<L>  3.8   |  25  |  5.09<H>  03-18    135  |  93<L>  |  51<H>  ----------------------------<  107<H>  3.6   |  25  |  4.09<H>  03-17    133<L>  |  90<L>  |  72<H>  ----------------------------<  193<H>  4.3   |  23  |  5.61<H>    Ca    8.6      20 Mar 2018 05:25  Ca    8.7      19 Mar 2018 05:40  Mg     2.6     03-20  Mg     1.9     03-19      CAPILLARY BLOOD GLUCOSE      POCT Blood Glucose.: 139 mg/dL (20 Mar 2018 12:05)  POCT Blood Glucose.: 115 mg/dL (20 Mar 2018 09:06)  POCT Blood Glucose.: 155 mg/dL (19 Mar 2018 22:41)  POCT Blood Glucose.: 92 mg/dL (19 Mar 2018 18:23)  POCT Blood Glucose.: 70 mg/dL (19 Mar 2018 17:56)  POCT Blood Glucose.: 69 mg/dL (19 Mar 2018 17:54)        PT/INR - ( 20 Mar 2018 05:25 )   PT: 35.3 SEC;   INR: 3.11          PTT - ( 20 Mar 2018 05:25 )  PTT:51.8 SEC    Cultures:           < from: Xray Chest 1 View- PORTABLE-Urgent (02.26.18 @ 11:27) >    INTERPRETATION:  CLINICAL INFORMATION: Shortness of breath.   Cardiomyopathy.    TECHNIQUE: AP chest radiograph    COMPARISON: Chest radiograph performed 2/17/2018. CT abdomen and pelvis   performed 10/30/2017.    FINDINGS:    A right-sided dialysis catheter terminates over SVC. There is a   left-sided cardiac device. There is redemonstration of diffuse   interstitial and reticular opacities bilaterally, mildly improved since   2/17/2018. There are small bilateral pleural effusions.    IMPRESSION:    Persistent diffuse interstitial and reticular lung opacities bilaterally,   mildly improved since 2/17/2018, which may represent pulmonary edema   superimposed on patient's known interstitial lung disease.    Small bilateral pleural effusions.              ANNIA MURPHY M.D., RADIOLOGY RESIDENT  This document has been electronically signed.  NITIN MAHARAJ M.D. ATTENDING RADIOLOGIST  This document has been electronically signed. Feb 26 2018  2:54PM        < end of copied text >                    Studies  Chest X-RAY  CT SCAN Chest   Venous Dopplers: LE:   CT Abdomen  Others

## 2018-03-20 NOTE — PROGRESS NOTE ADULT - SUBJECTIVE AND OBJECTIVE BOX
Patient is a 62y old  Male who presents with a chief complaint of SOB x few hours (20 Feb 2018 18:05)      SUBJECTIVE / OVERNIGHT EVENTS: some SOB but at baseline. denies any other complaints     ROS:  No HA/DZ  No Vision changes   No CP  No N/V/D  No Rash  NO weakness, numbness    MEDICATIONS  (STANDING):  ALBUTerol/ipratropium for Nebulization 3 milliLiter(s) Nebulizer every 6 hours  amiodarone    Tablet 100 milliGRAM(s) Oral daily  aspirin enteric coated 81 milliGRAM(s) Oral daily  atorvastatin 80 milliGRAM(s) Oral at bedtime  buDESOnide   90 MICROgram(s) Inhaler 2 Puff(s) Inhalation two times a day  dextrose 5%. 1000 milliLiter(s) (50 mL/Hr) IV Continuous <Continuous>  dextrose 5%. 1000 milliLiter(s) (50 mL/Hr) IV Continuous <Continuous>  dextrose 50% Injectable 12.5 Gram(s) IV Push once  dextrose 50% Injectable 25 Gram(s) IV Push once  dextrose 50% Injectable 25 Gram(s) IV Push once  DOBUTamine Infusion 7.5 MICROgram(s)/kG/Min (16.942 mL/Hr) IV Continuous <Continuous>  docusate sodium 100 milliGRAM(s) Oral two times a day  finasteride 5 milliGRAM(s) Oral daily  influenza   Vaccine 0.5 milliLiter(s) IntraMuscular once  insulin glargine Injectable (LANTUS) 20 Unit(s) SubCutaneous at bedtime  insulin lispro (HumaLOG) corrective regimen sliding scale   SubCutaneous three times a day before meals  insulin lispro (HumaLOG) corrective regimen sliding scale   SubCutaneous at bedtime  levothyroxine 75 MICROGram(s) Oral daily  midodrine 30 milliGRAM(s) Oral three times a day  MIRACLE MOUTHWASH 30 milliLiter(s) Swish and Spit three times a day  Nephro-lynda 1 Tablet(s) Oral daily  polyethylene glycol 3350 17 Gram(s) Oral daily  Saliva Substitute (CAPHOSOL) 30 milliLiter(s) Swish and Spit every 6 hours  senna 2 Tablet(s) Oral at bedtime    MEDICATIONS  (PRN):  benzocaine 15 mG/menthol 3.6 mG Lozenge 1 Lozenge Oral every 6 hours PRN Sore Throat  dextrose Gel 1 Dose(s) Oral once PRN Blood Glucose LESS THAN 70 milliGRAM(s)/deciLiter  dextrose Gel 1 Dose(s) Oral once PRN Blood Glucose LESS THAN 70 milliGRAM(s)/deciliter  glucagon  Injectable 1 milliGRAM(s) IntraMuscular once PRN Glucose <70 milliGRAM(s)/deciLiter  guaiFENesin    Syrup 100 milliGRAM(s) Oral every 6 hours PRN Cough  guaiFENesin   Syrup  (Sugar-Free) 200 milliGRAM(s) Oral every 6 hours PRN Cough  sodium chloride 0.65% Nasal 1 Spray(s) Both Nostrils every 2 hours PRN Nasal Congestion      T(C): 36.4 (03-20-18 @ 04:56)  HR: 82 (03-20-18 @ 12:45)  BP: 140/63 (03-20-18 @ 04:56)  RR: 18 (03-20-18 @ 04:56)  SpO2: 95% (03-20-18 @ 12:45)  CAPILLARY BLOOD GLUCOSE      POCT Blood Glucose.: 139 mg/dL (20 Mar 2018 12:05)  POCT Blood Glucose.: 115 mg/dL (20 Mar 2018 09:06)  POCT Blood Glucose.: 155 mg/dL (19 Mar 2018 22:41)  POCT Blood Glucose.: 92 mg/dL (19 Mar 2018 18:23)  POCT Blood Glucose.: 70 mg/dL (19 Mar 2018 17:56)  POCT Blood Glucose.: 69 mg/dL (19 Mar 2018 17:54)    I&O's Summary    19 Mar 2018 07:01  -  20 Mar 2018 07:00  --------------------------------------------------------  IN: 400 mL / OUT: 3400 mL / NET: -3000 mL        PHYSICAL EXAM:  GENERAL: NAD, well-developed, AOx3  HEAD:  Atraumatic, Normocephalic  EYES: EOMI, PERRL, conjunctiva and sclera clear  NECK: Supple, No JVD  CHEST/LUNG: basilar crackles   HEART: Regular rate and rhythm; No murmurs, rubs, or gallops, +2-3 Pitting LE Edema  ABDOMEN: Soft, Nontender, Nondistended; Bowel sounds present  EXTREMITIES:  2+ Peripheral Pulses, No clubbing, cyanosis  PSYCH: No SI/HI  NEUROLOGY: non-focal  SKIN: No rashes or lesions    LABS:                        11.0   8.87  )-----------( 104      ( 20 Mar 2018 05:25 )             35.7     03-20    138  |  96<L>  |  41<H>  ----------------------------<  115<H>  3.6   |  27  |  3.60<H>    Ca    8.6      20 Mar 2018 05:25  Mg     2.6     03-20      PT/INR - ( 20 Mar 2018 05:25 )   PT: 35.3 SEC;   INR: 3.11          PTT - ( 20 Mar 2018 05:25 )  PTT:51.8 SEC              RADIOLOGY & ADDITIONAL TESTS:    Imaging Personally Reviewed:    Consultant(s) Notes Reviewed:      Care Discussed with Consultants/Other Providers:

## 2018-03-20 NOTE — PROGRESS NOTE ADULT - PROBLEM SELECTOR PLAN 1
3/1: says his breathing is better: would cont to use steroids as well as bipap at night time: Cont oxygen  to keep sao2 above 88%:  3/2: doing reasonable: cont current care:  3/3: decrease  steroids to 15 mg a day  3/4: given risk of increasing retention of salt and water , and he has been on dobutamine: would cut it down to 10 mg tomorrow and slowly taper it to off  3/5: It is very hard to determine whether steroids are beneficially improving his SOB: I doubt that steroids are of much help here: They may also potentiate salt and water retention! Will taper them off!  3/6: today sister says she would like to continue steroids for sometime : would continue for now and taper slowly: May be send home on 10 mg of steroids:  3/7: doing ok : cont current care: Has CMP and is on Dobutamine Drip  3/8: on dobutamine  3/9: cont dobutamine and night time BiPAP  3/10: cont current treatment : breathing wise he remains tenuous:  3/11: on dobutamine:  3/12: o n dobutamine:  3/13: to cont dobutamine: Pt is on night time bipap: But he has not used it in last few days but would like to have one at home upon dc: May arrange for it : Pt encouraged to use the bipap daily:  3/14: doing ok : cont current treatment: finish steroids on 16th  3/15: doing same: SOB off and on :  3/19: pt uses bipap prn at night: yesterday she did not use it  3/17 stable.  3/18 stable. on room air. on Dobutamine gtt  3/19: cont current care: overall prognosis remains poor: he is getting maximal therapy for CHF as well as supportive treatment for pulmonary fibrosis:  3/20: doingok : would need bipap at home

## 2018-03-20 NOTE — PROGRESS NOTE ADULT - PROBLEM SELECTOR PLAN 6
monitor blood glucose: defer to primary care:  3/1: blood glucose is  uncontrolled: ? secondary to steroids: would defer to endo  3/2: still uncontrolled: defer to primary care:  3/6: Blood glucose in 200-300's: Defer to primary  3/9: Reasonable  3/13: Blood glucose controlled  3/15: blood glucose controlled  3/17 stable  3/20: stable:

## 2018-03-20 NOTE — PROGRESS NOTE ADULT - PROBLEM SELECTOR PLAN 3
2/28: cont steroids to see how he responds to it in next few days  3/1: cont steroids as well as BD  3/2: cont steroids  3/3:major issue is pulmonary fibrosis: cont current care:  3/14: cont current care:  3/5: can change pulmicort to symbicort:  3/6: cont low dose steroids  3/7: on steroids  3/8: steoids taper  3/9: Cont 10 mg of steroids at this time  3/10: cont 10 mg , will change to 5 mg soon  3/11: no wheezing: today : michael change to po steroids twice day : till her MARGIE is done tomorrow:  3/12: no wheezing: cont BD : taper steroids to off  3/13: Cont Pulmicort: doubt he will be able to use Symbicort properly:  3/14: cont pulmicort as well as bronchodilators:  3/15: Cont bronchodilators:  3/17 Pulmicort, Duoneb  3/18 stable with current management. continue current management  3/19: on pulmicort as well as nebs  3/20: Cont BD : no wheezing

## 2018-03-20 NOTE — PROGRESS NOTE ADULT - PROBLEM SELECTOR PLAN 4
- FS overall controlled  - Continue with Lantus 20U QHS, pre-meal insulin discontinued as FSG readings are lower now that pt is off steroids.

## 2018-03-20 NOTE — PROGRESS NOTE ADULT - PROBLEM SELECTOR PLAN 5
HR controlled  ac as per cards/medicine  management as per cards  3/1: INR is therapeutic:  3/3: INR is therapeutic  3/5: INR therapeutic::  3/9: INR is therapeutic:  3/11: INR is low:  3/12: 1.70  3/13: HR is controlled:: On AC per primary/Cardiology  3/14: HR seems to be controlled: on AC  3/15: Cont AC  3/16: Stable: on AC  3/17 on AC  3/19: HR controlled: on AC  3/20: HR seems to be controlled

## 2018-03-20 NOTE — PROGRESS NOTE ADULT - PROBLEM SELECTOR PLAN 8
- Pt wishes to continue with all aggressive measures; states that even if he's dependent on machines, he wants to do everything to stay alive.  - Very grave prognosis, pt may decompensate this admission.  Family fully aware of pt's decisions. Time spent on face to face advanced care planning with patient 35 min   - I am trying to get patient to a point where he can be discharged safely and only require up to 3-4 sessions of HD.  This week he has received HD/UF daily, which is NOT feasible as an outpatient.

## 2018-03-20 NOTE — PROGRESS NOTE ADULT - PROBLEM SELECTOR PLAN 6
- Hypercoagulable state 2/2 chronic Afib   - HR at goal. Not a candidate for beta blockers given chronic hypotension.  - On warfarin. Dose coumadin. Monitor INR.   - c/w amiodarone; as per discussion with Dr. Davis, would continue with Amiodarone despite pulmonary fibrosis. - Hypercoagulable state 2/2 chronic Afib   - HR at goal. Not a candidate for beta blockers given chronic hypotension.  - On warfarin. Dose coumadin. hold coumadin tonight for supra-therapeutic NR   - c/w amiodarone; as per discussion with Dr. Davis, would continue with Amiodarone despite pulmonary fibrosis.

## 2018-03-20 NOTE — PROGRESS NOTE ADULT - PROBLEM SELECTOR PLAN 2
3/1: on retrial of steroids: Pt thinks his breathing is better then before: Would like to cont steroids at this t alirio  3/2: cont steroids: pt seems to have stable SOB , does not seem to be improving further to me:  3/3: decrease steroids to 15 mg ad ay  3/4: on 15 mg today , decrease to 10 mg a day  3/5: taper off prednisone: already ordered  3*6: cont steroids  3/7: doing pretty poor: cont steroids trial for some time  3/8: on steroids at tis time: low dose steroids  3/9: It is difficult to discern if steroids are helpful in him: However the pt as well as the sister insists that we cont to try steroids: They think he feels a little better with it: Side effects have been explained to them:  3/10: cont 10 mg today too ? decrease to 5 mg soon  3/11: on steroids:  3/12: pt is on 5 mg of steroids: will taper  to off on next few days:  3/13: pt has been doing poorly: cont prednisone till 16 of March.  3/14: don't think steroids are helping much : will finish soon:  3/16: no significant benefit from steroids: DC steroids:   3/15:has extensive fibrosis: taper steroids to off tomorrow:  3/16 off steroid  3/18 monitor.  3/19: Off steroids  3/20: I don't see any need for steorids::

## 2018-03-20 NOTE — PROGRESS NOTE ADULT - PROBLEM SELECTOR PLAN 2
- Pt still volume overloaded but improved post HD yesterday. I extensively discussed this with pt and his sisters.  wants all aggressive measures done. will reassess rell after HD, if can maintain volume via HD 3x weekly, plan for DC. Cw  Gtt

## 2018-03-20 NOTE — PROGRESS NOTE ADULT - SUBJECTIVE AND OBJECTIVE BOX
[facility] Consult Note Nephrology - CONSULTATION NOTE    Patient is a 62y old  Male who presents with a chief complaint of SOB x few hours (20 Feb 2018 18:05)    HPI:  Patient is a 62 year old man with ESRD (HD MWF), NICM (EF 21%) s/p ICD, PICC line in place with home dobutamine drip, atrial fibrillation on coumadin, COPD on home O2 (4-5L), pulmonary fibrosis, hypotension on midodrine with recent admission 1/23-1/31 for diarrhea found to be positive for human meta pneumovirus who now returns to [facility] ER complaining of shortness of breath that started earlier this evening. He describes the sensation as his "oxygen not being enough him." He denies sick contacts or travel since last admission. He denies fever, chills, cough, chest pain, wheezing, abdominal pain, vomiting. He endorses LE edema worse than usual as well as one episode of non-bloody diarrhea. Patient states he tried his inhaler with no relief of SOB. He completed HD yesterday and had removal of 3.5L. He denies dietary indiscretion. Patient currently is on BiPAP and feels comfortable using the mask. Pt does not make urine.     In ER: T 98.4, HR 83, BP 91/52, RR 14, SpO2 98% on 40% FiO2 on BiPAP 12/5. Patient received dobutamine gtts at home dose in ER. RVP+ for influenza B infection. (13 Feb 2018 22:11)      PAST MEDICAL & SURGICAL HISTORY:  Seizure: Off Keppra since 7/2017  Chronic hypotension  AF (atrial fibrillation)  COPD (chronic obstructive pulmonary disease): 4L home O2  HLD (hyperlipidemia)  DM (diabetes mellitus): Off Insulin since 7/2017  ESRD (end stage renal disease) on dialysis  BPH (benign prostatic hypertrophy)  Myocardial infarction: 10/2011  Gout  CHF (congestive heart failure)  AICD (automatic cardioverter/defibrillator) present: Biot[employer] - placed 9/11/09  H/O coronary angiogram      Allergies:  No Known Allergies    Home Medications Reviewed  Hospital Medications:   MEDICATIONS  (STANDING):  ALBUTerol/ipratropium for Nebulization 3 milliLiter(s) Nebulizer every 6 hours  amiodarone    Tablet 100 milliGRAM(s) Oral daily  aspirin enteric coated 81 milliGRAM(s) Oral daily  atorvastatin 80 milliGRAM(s) Oral at bedtime  buDESOnide   90 MICROgram(s) Inhaler 2 Puff(s) Inhalation two times a day  dextrose 5%. 1000 milliLiter(s) (50 mL/Hr) IV Continuous <Continuous>  dextrose 5%. 1000 milliLiter(s) (50 mL/Hr) IV Continuous <Continuous>  dextrose 50% Injectable 12.5 Gram(s) IV Push once  dextrose 50% Injectable 25 Gram(s) IV Push once  dextrose 50% Injectable 25 Gram(s) IV Push once  DOBUTamine Infusion 7.5 MICROgram(s)/kG/Min (16.942 mL/Hr) IV Continuous <Continuous>  docusate sodium 100 milliGRAM(s) Oral two times a day  finasteride 5 milliGRAM(s) Oral daily  influenza   Vaccine 0.5 milliLiter(s) IntraMuscular once  insulin glargine Injectable (LANTUS) 20 Unit(s) SubCutaneous at bedtime  insulin lispro (HumaLOG) corrective regimen sliding scale   SubCutaneous three times a day before meals  insulin lispro (HumaLOG) corrective regimen sliding scale   SubCutaneous at bedtime  levothyroxine 75 MICROGram(s) Oral daily  midodrine 30 milliGRAM(s) Oral three times a day  MIRACLE MOUTHWASH 30 milliLiter(s) Swish and Spit three times a day  Nephro-lynda 1 Tablet(s) Oral daily  polyethylene glycol 3350 17 Gram(s) Oral daily  Saliva Substitute (CAPHOSOL) 30 milliLiter(s) Swish and Spit every 6 hours  senna 2 Tablet(s) Oral at bedtime    SOCIAL HISTORY:  Denies ETOh,Smoking,   FAMILY HISTORY:  No pertinent family history in first degree relatives    REVIEW OF SYSTEMS:  CONSTITUTIONAL: No weakness, fevers or chills  EYES/ENT: No visual changes;  No vertigo or throat pain   NECK: No pain or stiffness  RESPIRATORY: No cough, wheezing, hemoptysis; No shortness of breath  CARDIOVASCULAR: No chest pain or palpitations.  GASTROINTESTINAL: No abdominal or epigastric pain. No nausea, vomiting, or hematemesis; No diarrhea or constipation. No melena or hematochezia.  GENITOURINARY: No dysuria, frequency, foamy urine, urinary urgency, incontinence or hematuria  NEUROLOGICAL: No numbness or weakness  SKIN: No itching, burning, rashes, or lesions   VASCULAR: No bilateral lower extremity edema.   All other review of systems is negative unless indicated above.    VITALS:  T(F): 97.5 (03-20-18 @ 04:56), Max: 98.4 (03-20-18 @ 02:15)  HR: 78 (03-20-18 @ 10:03)  BP: 140/63 (03-20-18 @ 04:56)  RR: 18 (03-20-18 @ 04:56)  SpO2: 96% (03-20-18 @ 07:04)  Wt(kg): --    03-19 @ 07:01  -  03-20 @ 07:00  --------------------------------------------------------  IN: 400 mL / OUT: 3400 mL / NET: -3000 mL        PHYSICAL EXAM:  Constitutional: NAD  HEENT: anicteric sclera, oropharynx clear, MMM  Neck: No JVD  Respiratory: CTAB, no wheezes, rales or rhonchi  Cardiovascular: S1, S2, RRR  Gastrointestinal: BS+, soft, NT/ND  Extremities: No cyanosis or clubbing. No peripheral edema  Neurological: A/O x 3, no focal deficits  Psychiatric: Normal mood, normal affect  : No CVA tenderness. No rosa.   Skin: No rashes  Vascular Access:    LABS:  03-20    138  |  96<L>  |  41<H>  ----------------------------<  115<H>  3.6   |  27  |  3.60<H>    Ca    8.6      20 Mar 2018 05:25  Mg     2.6     03-20      Creatinine Trend: 3.60 <--, 5.09 <--, 4.09 <--, 5.61 <--, 4.27 <--, 5.60 <--, 4.49 <--                        11.0   8.87  )-----------( 104      ( 20 Mar 2018 05:25 )             35.7     Urine Studies:      RADIOLOGY & ADDITIONAL STUDIES:

## 2018-03-20 NOTE — PROGRESS NOTE ADULT - SUBJECTIVE AND OBJECTIVE BOX
Pt seen and examined at bedside  feels well  no new compls  dyspnea better  no chest pain,dizziness  no nausea,vomiting,diarrea      Allergies:  No Known Allergies    Hospital Medications:   MEDICATIONS  (STANDING):  ALBUTerol/ipratropium for Nebulization 3 milliLiter(s) Nebulizer every 6 hours  amiodarone    Tablet 100 milliGRAM(s) Oral daily  aspirin enteric coated 81 milliGRAM(s) Oral daily  atorvastatin 80 milliGRAM(s) Oral at bedtime  buDESOnide   90 MICROgram(s) Inhaler 2 Puff(s) Inhalation two times a day  dextrose 5%. 1000 milliLiter(s) (50 mL/Hr) IV Continuous <Continuous>  dextrose 5%. 1000 milliLiter(s) (50 mL/Hr) IV Continuous <Continuous>  dextrose 50% Injectable 12.5 Gram(s) IV Push once  dextrose 50% Injectable 25 Gram(s) IV Push once  dextrose 50% Injectable 25 Gram(s) IV Push once  DOBUTamine Infusion 7.5 MICROgram(s)/kG/Min (16.942 mL/Hr) IV Continuous <Continuous>  docusate sodium 100 milliGRAM(s) Oral two times a day  finasteride 5 milliGRAM(s) Oral daily  influenza   Vaccine 0.5 milliLiter(s) IntraMuscular once  insulin glargine Injectable (LANTUS) 20 Unit(s) SubCutaneous at bedtime  insulin lispro (HumaLOG) corrective regimen sliding scale   SubCutaneous three times a day before meals  insulin lispro (HumaLOG) corrective regimen sliding scale   SubCutaneous at bedtime  levothyroxine 75 MICROGram(s) Oral daily  midodrine 30 milliGRAM(s) Oral three times a day  MIRACLE MOUTHWASH 30 milliLiter(s) Swish and Spit three times a day  Nephro-lynda 1 Tablet(s) Oral daily  polyethylene glycol 3350 17 Gram(s) Oral daily  Saliva Substitute (CAPHOSOL) 30 milliLiter(s) Swish and Spit every 6 hours  senna 2 Tablet(s) Oral at bedtime         VITALS:  T(F): 97.5 (03-20-18 @ 04:56), Max: 98.4 (03-20-18 @ 02:15)  HR: 78 (03-20-18 @ 10:03)  BP: 140/63 (03-20-18 @ 04:56)  RR: 18 (03-20-18 @ 04:56)  SpO2: 96% (03-20-18 @ 07:04)  Wt(kg): --    03-19 @ 07:01  -  03-20 @ 07:00  --------------------------------------------------------  IN: 400 mL / OUT: 3400 mL / NET: -3000 mL        PHYSICAL EXAM:  Constitutional: NAD, using nasal O2  HEENT: anicteric sclera, oropharynx clear, MMM  Neck: No JVD  Respiratory: few fine scattered crepts  Cardiovascular: S1, S2, irreg  Gastrointestinal: BS+, soft, NT/ND  Extremities: No cyanosis or clubbing. 2+ leg edema, improved since previously  Neurological: A/O x 3, no focal deficits  Psychiatric: Normal mood, normal affect  : No CVA tenderness. No rosa.   Skin: No rashes  Vascular Access:  avf    LABS:  03-20    138  |  96<L>  |  41<H>  ----------------------------<  115<H>  3.6   |  27  |  3.60<H>    Ca    8.6      20 Mar 2018 05:25  Mg     2.6     03-20      Creatinine Trend: 3.60 <--, 5.09 <--, 4.09 <--, 5.61 <--, 4.27 <--, 5.60 <--, 4.49 <--                        11.0   8.87  )-----------( 104      ( 20 Mar 2018 05:25 )             35.7     Urine Studies:      RADIOLOGY & ADDITIONAL STUDIES:

## 2018-03-20 NOTE — PROGRESS NOTE ADULT - PROBLEM SELECTOR PLAN 1
had HD yesterday. tolerated it well.  feels well today, no dyspnea  repeat HD tomorrow  c/w renal diet, fluid restriction 1.2L/day, reinforced w/pt  ok to d/c renal stand point after hd.  chronic volume overload, will provide extra PUF prn, outpt based on availability.   end stage heart failure and pulm fibrosis. Pt resistant to withdrawing any care at this time.

## 2018-03-21 LAB
APTT BLD: 51 SEC — HIGH (ref 27.5–37.4)
BUN SERPL-MCNC: 64 MG/DL — HIGH (ref 7–23)
CALCIUM SERPL-MCNC: 8.6 MG/DL — SIGNIFICANT CHANGE UP (ref 8.4–10.5)
CHLORIDE SERPL-SCNC: 96 MMOL/L — LOW (ref 98–107)
CO2 SERPL-SCNC: 26 MMOL/L — SIGNIFICANT CHANGE UP (ref 22–31)
CREAT SERPL-MCNC: 4.97 MG/DL — HIGH (ref 0.5–1.3)
GLUCOSE BLDC GLUCOMTR-MCNC: 109 MG/DL — HIGH (ref 70–99)
GLUCOSE BLDC GLUCOMTR-MCNC: 123 MG/DL — HIGH (ref 70–99)
GLUCOSE BLDC GLUCOMTR-MCNC: 124 MG/DL — HIGH (ref 70–99)
GLUCOSE BLDC GLUCOMTR-MCNC: 139 MG/DL — HIGH (ref 70–99)
GLUCOSE BLDC GLUCOMTR-MCNC: 190 MG/DL — HIGH (ref 70–99)
GLUCOSE BLDC GLUCOMTR-MCNC: 199 MG/DL — HIGH (ref 70–99)
GLUCOSE BLDC GLUCOMTR-MCNC: 247 MG/DL — HIGH (ref 70–99)
GLUCOSE SERPL-MCNC: 128 MG/DL — HIGH (ref 70–99)
HCT VFR BLD CALC: 33.4 % — LOW (ref 39–50)
HGB BLD-MCNC: 10.7 G/DL — LOW (ref 13–17)
INR BLD: 3.05 — HIGH (ref 0.88–1.17)
MAGNESIUM SERPL-MCNC: 2 MG/DL — SIGNIFICANT CHANGE UP (ref 1.6–2.6)
MCHC RBC-ENTMCNC: 31.7 PG — SIGNIFICANT CHANGE UP (ref 27–34)
MCHC RBC-ENTMCNC: 32 % — SIGNIFICANT CHANGE UP (ref 32–36)
MCV RBC AUTO: 98.8 FL — SIGNIFICANT CHANGE UP (ref 80–100)
NRBC # FLD: 0 — SIGNIFICANT CHANGE UP
PLATELET # BLD AUTO: 98 K/UL — LOW (ref 150–400)
PMV BLD: 13.1 FL — HIGH (ref 7–13)
POTASSIUM SERPL-MCNC: 4 MMOL/L — SIGNIFICANT CHANGE UP (ref 3.5–5.3)
POTASSIUM SERPL-SCNC: 4 MMOL/L — SIGNIFICANT CHANGE UP (ref 3.5–5.3)
PROTHROM AB SERPL-ACNC: 35.9 SEC — HIGH (ref 9.8–13.1)
RBC # BLD: 3.38 M/UL — LOW (ref 4.2–5.8)
RBC # FLD: 18.3 % — HIGH (ref 10.3–14.5)
SODIUM SERPL-SCNC: 138 MMOL/L — SIGNIFICANT CHANGE UP (ref 135–145)
WBC # BLD: 8.16 K/UL — SIGNIFICANT CHANGE UP (ref 3.8–10.5)
WBC # FLD AUTO: 8.16 K/UL — SIGNIFICANT CHANGE UP (ref 3.8–10.5)

## 2018-03-21 PROCEDURE — 99233 SBSQ HOSP IP/OBS HIGH 50: CPT

## 2018-03-21 RX ADMIN — Medication 16.94 MICROGRAM(S)/KG/MIN: at 22:32

## 2018-03-21 RX ADMIN — Medication 3 MILLILITER(S): at 03:30

## 2018-03-21 RX ADMIN — Medication 2: at 13:37

## 2018-03-21 RX ADMIN — Medication 100 MILLIGRAM(S): at 05:53

## 2018-03-21 RX ADMIN — Medication 75 MICROGRAM(S): at 05:53

## 2018-03-21 RX ADMIN — Medication 1 TABLET(S): at 13:35

## 2018-03-21 RX ADMIN — DIPHENHYDRAMINE HYDROCHLORIDE AND LIDOCAINE HYDROCHLORIDE AND ALUMINUM HYDROXIDE AND MAGNESIUM HYDRO 30 MILLILITER(S): KIT at 13:37

## 2018-03-21 RX ADMIN — ATORVASTATIN CALCIUM 80 MILLIGRAM(S): 80 TABLET, FILM COATED ORAL at 22:33

## 2018-03-21 RX ADMIN — DIPHENHYDRAMINE HYDROCHLORIDE AND LIDOCAINE HYDROCHLORIDE AND ALUMINUM HYDROXIDE AND MAGNESIUM HYDRO 30 MILLILITER(S): KIT at 22:36

## 2018-03-21 RX ADMIN — Medication 30 MILLILITER(S): at 13:36

## 2018-03-21 RX ADMIN — FINASTERIDE 5 MILLIGRAM(S): 5 TABLET, FILM COATED ORAL at 13:34

## 2018-03-21 RX ADMIN — Medication 30 MILLILITER(S): at 05:53

## 2018-03-21 RX ADMIN — MIDODRINE HYDROCHLORIDE 30 MILLIGRAM(S): 2.5 TABLET ORAL at 22:33

## 2018-03-21 RX ADMIN — Medication 30 MILLILITER(S): at 00:35

## 2018-03-21 RX ADMIN — DIPHENHYDRAMINE HYDROCHLORIDE AND LIDOCAINE HYDROCHLORIDE AND ALUMINUM HYDROXIDE AND MAGNESIUM HYDRO 30 MILLILITER(S): KIT at 05:57

## 2018-03-21 RX ADMIN — Medication 3 MILLILITER(S): at 16:32

## 2018-03-21 RX ADMIN — Medication 30 MILLILITER(S): at 18:01

## 2018-03-21 RX ADMIN — MIDODRINE HYDROCHLORIDE 30 MILLIGRAM(S): 2.5 TABLET ORAL at 05:51

## 2018-03-21 RX ADMIN — Medication 30 MILLILITER(S): at 23:44

## 2018-03-21 RX ADMIN — Medication 100 MILLIGRAM(S): at 18:01

## 2018-03-21 RX ADMIN — INSULIN GLARGINE 20 UNIT(S): 100 INJECTION, SOLUTION SUBCUTANEOUS at 23:43

## 2018-03-21 RX ADMIN — Medication 3 MILLILITER(S): at 21:37

## 2018-03-21 RX ADMIN — MIDODRINE HYDROCHLORIDE 30 MILLIGRAM(S): 2.5 TABLET ORAL at 13:36

## 2018-03-21 RX ADMIN — Medication 2 PUFF(S): at 22:33

## 2018-03-21 RX ADMIN — AMIODARONE HYDROCHLORIDE 100 MILLIGRAM(S): 400 TABLET ORAL at 05:51

## 2018-03-21 RX ADMIN — Medication 81 MILLIGRAM(S): at 13:34

## 2018-03-21 NOTE — PROGRESS NOTE ADULT - PROBLEM SELECTOR PLAN 2
clinically improved volume status, and improved symptoms. now receiving HD on regular schedule starting this week. will DC plan after HD with out-pt follow up. Cw  gtt

## 2018-03-21 NOTE — PROGRESS NOTE ADULT - PROBLEM SELECTOR PLAN 8
- Pt wishes to continue with all aggressive measures; states that even if he's dependent on machines, he wants to do everything to stay alive.  - Very poor prognosis, high chance of decompensation after discharge.  Family fully aware of pt's decisions. DC planning for today or rell post HD. kiera DRAKE and CHANDAN

## 2018-03-21 NOTE — PROGRESS NOTE ADULT - PROBLEM SELECTOR PLAN 2
3/2: cont steroids: pt seems to have stable SOB , does not seem to be improving further to me:  3/3: decrease steroids to 15 mg ad ay  3/4: on 15 mg today , decrease to 10 mg a day  3/5: taper off prednisone: already ordered  3*6: cont steroids  3/7: doing pretty poor: cont steroids trial for some time  3/8: on steroids at tis time: low dose steroids  3/9: It is difficult to discern if steroids are helpful in him: However the pt as well as the sister insists that we cont to try steroids: They think he feels a little better with it: Side effects have been explained to them:  3/10: cont 10 mg today too ? decrease to 5 mg soon  3/11: on steroids:  3/12: pt is on 5 mg of steroids: will taper  to off on next few days:  3/13: pt has been doing poorly: cont prednisone till 16 of March.  3/14: don't think steroids are helping much : will finish soon:  3/16: no significant benefit from steroids: DC steroids:   3/15:has extensive fibrosis: taper steroids to off tomorrow:  3/16 off steroid  3/18 monitor.  3/19: Off steroids  3/20: I don't see any need for steorids::  3/21: stable: cont conservative managemnt

## 2018-03-21 NOTE — PROGRESS NOTE ADULT - PROBLEM SELECTOR PLAN 4
hd as per renal  keep net negative as able  3/1: on HD  3/17 HD  3/4: on HD  3/9: cont HD per renal  3/11: on HD  3/12: on HD  3/14: Cont HD  3/15: On HD  3/19: Cont HD  3/21: on HD

## 2018-03-21 NOTE — PROGRESS NOTE ADULT - PROBLEM SELECTOR PLAN 3
3/2: cont steroids  3/3:major issue is pulmonary fibrosis: cont current care:  3/14: cont current care:  3/5: can change pulmicort to symbicort:  3/6: cont low dose steroids  3/7: on steroids  3/8: steoids taper  3/9: Cont 10 mg of steroids at this time  3/10: cont 10 mg , will change to 5 mg soon  3/11: no wheezing: today : michael change to po steroids twice day : till her MARGIE is done tomorrow:  3/12: no wheezing: cont BD : taper steroids to off  3/13: Cont Pulmicort: doubt he will be able to use Symbicort properly:  3/14: cont pulmicort as well as bronchodilators:  3/15: Cont bronchodilators:  3/17 Pulmicort, Duoneb  3/18 stable with current management. continue current management  3/19: on pulmicort as well as nebs  3/20: Cont BD : no wheezing  3/21: no wheezing:

## 2018-03-21 NOTE — PROGRESS NOTE ADULT - SUBJECTIVE AND OBJECTIVE BOX
Patient is a 62y old  Male who presents with a chief complaint of SOB x few hours (20 Feb 2018 18:05)      Any change in ROS: pt is doing same  no worsening of his sob     MEDICATIONS  (STANDING):  ALBUTerol/ipratropium for Nebulization 3 milliLiter(s) Nebulizer every 6 hours  amiodarone    Tablet 100 milliGRAM(s) Oral daily  aspirin enteric coated 81 milliGRAM(s) Oral daily  atorvastatin 80 milliGRAM(s) Oral at bedtime  buDESOnide   90 MICROgram(s) Inhaler 2 Puff(s) Inhalation two times a day  dextrose 5%. 1000 milliLiter(s) (50 mL/Hr) IV Continuous <Continuous>  dextrose 5%. 1000 milliLiter(s) (50 mL/Hr) IV Continuous <Continuous>  dextrose 50% Injectable 12.5 Gram(s) IV Push once  dextrose 50% Injectable 25 Gram(s) IV Push once  dextrose 50% Injectable 25 Gram(s) IV Push once  DOBUTamine Infusion 7.5 MICROgram(s)/kG/Min (16.942 mL/Hr) IV Continuous <Continuous>  docusate sodium 100 milliGRAM(s) Oral two times a day  finasteride 5 milliGRAM(s) Oral daily  influenza   Vaccine 0.5 milliLiter(s) IntraMuscular once  insulin glargine Injectable (LANTUS) 20 Unit(s) SubCutaneous at bedtime  insulin lispro (HumaLOG) corrective regimen sliding scale   SubCutaneous three times a day before meals  insulin lispro (HumaLOG) corrective regimen sliding scale   SubCutaneous at bedtime  levothyroxine 75 MICROGram(s) Oral daily  midodrine 30 milliGRAM(s) Oral three times a day  MIRACLE MOUTHWASH 30 milliLiter(s) Swish and Spit three times a day  Nephro-lynda 1 Tablet(s) Oral daily  polyethylene glycol 3350 17 Gram(s) Oral daily  Saliva Substitute (CAPHOSOL) 30 milliLiter(s) Swish and Spit every 6 hours  senna 2 Tablet(s) Oral at bedtime    MEDICATIONS  (PRN):  benzocaine 15 mG/menthol 3.6 mG Lozenge 1 Lozenge Oral every 6 hours PRN Sore Throat  dextrose Gel 1 Dose(s) Oral once PRN Blood Glucose LESS THAN 70 milliGRAM(s)/deciLiter  dextrose Gel 1 Dose(s) Oral once PRN Blood Glucose LESS THAN 70 milliGRAM(s)/deciliter  glucagon  Injectable 1 milliGRAM(s) IntraMuscular once PRN Glucose <70 milliGRAM(s)/deciLiter  guaiFENesin    Syrup 100 milliGRAM(s) Oral every 6 hours PRN Cough  guaiFENesin   Syrup  (Sugar-Free) 200 milliGRAM(s) Oral every 6 hours PRN Cough  sodium chloride 0.65% Nasal 1 Spray(s) Both Nostrils every 2 hours PRN Nasal Congestion    Vital Signs Last 24 Hrs  T(C): 36.2 (21 Mar 2018 10:56), Max: 36.7 (20 Mar 2018 21:16)  T(F): 97.1 (21 Mar 2018 10:56), Max: 98.1 (21 Mar 2018 01:19)  HR: 84 (21 Mar 2018 11:33) (71 - 87)  BP: 94/56 (21 Mar 2018 10:56) (87/63 - 117/73)  BP(mean): --  RR: 18 (21 Mar 2018 10:56) (17 - 18)  SpO2: 97% (21 Mar 2018 11:33) (90% - 97%)    I&O's Summary    21 Mar 2018 07:01  -  21 Mar 2018 13:55  --------------------------------------------------------  IN: 500 mL / OUT: 3500 mL / NET: -3000 mL          Physical Exam:   GENERAL: NAD, well-groomed, well-developed  HEENT: BELL/   Atraumatic, Normocephalic  ENMT: No tonsillar erythema, exudates, or enlargement; Moist mucous membranes, Good dentition, No lesions  NECK: Supple, No JVD, Normal thyroid  CHEST/LUNG: CRACKLES MOSTLY AT LEFT BASE  CVS: Regular rate and rhythm; No murmurs, rubs, or gallops  GI: : Soft, Nontender, Nondistended; Bowel sounds present  NERVOUS SYSTEM:  Alert & Oriented X3  EXTREMITIES:  2+  edema  LYMPH: No lymphadenopathy noted  SKIN: No rashes or lesions  ENDOCRINOLOGY: No Thyromegaly  PSYCH: Appropriate    Labs:                              10.7   8.16  )-----------( 98       ( 21 Mar 2018 06:45 )             33.4                         11.0   8.87  )-----------( 104      ( 20 Mar 2018 05:25 )             35.7                         11.2   7.73  )-----------( 100      ( 19 Mar 2018 05:40 )             35.2                         10.8   8.42  )-----------( 104      ( 18 Mar 2018 06:20 )             33.8     03-21    138  |  96<L>  |  64<H>  ----------------------------<  128<H>  4.0   |  26  |  4.97<H>  03-20    138  |  96<L>  |  41<H>  ----------------------------<  115<H>  3.6   |  27  |  3.60<H>  03-19    138  |  94<L>  |  68<H>  ----------------------------<  60<L>  3.8   |  25  |  5.09<H>  03-18    135  |  93<L>  |  51<H>  ----------------------------<  107<H>  3.6   |  25  |  4.09<H>    Ca    8.6      21 Mar 2018 06:45  Ca    8.6      20 Mar 2018 05:25  Mg     2.0     03-21  Mg     2.6     03-20      CAPILLARY BLOOD GLUCOSE      POCT Blood Glucose.: 247 mg/dL (21 Mar 2018 13:34)  POCT Blood Glucose.: 139 mg/dL (21 Mar 2018 09:11)  POCT Blood Glucose.: 109 mg/dL (21 Mar 2018 05:46)  POCT Blood Glucose.: 188 mg/dL (20 Mar 2018 21:52)  POCT Blood Glucose.: 183 mg/dL (20 Mar 2018 18:06)        PT/INR - ( 21 Mar 2018 06:45 )   PT: 35.9 SEC;   INR: 3.05          PTT - ( 21 Mar 2018 06:45 )  PTT:51.0 SEC    Cultures:             < from: Xray Chest 1 View- PORTABLE-Urgent (02.26.18 @ 11:27) >  EXAM:  XR CHEST PORTABLE URGENT 1V        PROCEDURE DATE:  Feb 26 2018         INTERPRETATION:  CLINICAL INFORMATION: Shortness of breath.   Cardiomyopathy.    TECHNIQUE: AP chest radiograph    COMPARISON: Chest radiograph performed 2/17/2018. CT abdomen and pelvis   performed 10/30/2017.    FINDINGS:    A right-sided dialysis catheter terminates over SVC. There is a   left-sided cardiac device. There is redemonstration of diffuse   interstitial and reticular opacities bilaterally, mildly improved since   2/17/2018. There are small bilateral pleural effusions.    IMPRESSION:    Persistent diffuse interstitial and reticular lung opacities bilaterally,   mildly improved since 2/17/2018, which may represent pulmonary edema   superimposed on patient's known interstitial lung disease.    Small bilateral pleural effusions.              ANNIA MURPHY M.D., RADIOLOGY RESIDENT  This document has been electronically signed.  NITIN MAHARAJ M.D. ATTENDING RADIOLOGIST    < end of copied text >                  Studies  Chest X-RAY  CT SCAN Chest   Venous Dopplers: LE:   CT Abdomen  Others

## 2018-03-21 NOTE — PROGRESS NOTE ADULT - PROBLEM SELECTOR PLAN 1
completed HD earlier today, rx sheet reviewed, net uf 3L achieved, bp low but tolerated it well.  plan for next routine hd 3/23  c/w renal diet, fluid restriction 1.2L/day, reinforced w/pt  ok to d/c renal stand point   chronic volume overload, will provide extra PUF prn, outpt based on availability.   end stage heart failure and pulm fibrosis. Pt resistant to withdrawing any care at this time.

## 2018-03-21 NOTE — PROGRESS NOTE ADULT - PROBLEM SELECTOR PLAN 1
3/2: doing reasonable: cont current care:  3/3: decrease  steroids to 15 mg a day  3/4: given risk of increasing retention of salt and water , and he has been on dobutamine: would cut it down to 10 mg tomorrow and slowly taper it to off  3/5: It is very hard to determine whether steroids are beneficially improving his SOB: I doubt that steroids are of much help here: They may also potentiate salt and water retention! Will taper them off!  3/6: today sister says she would like to continue steroids for sometime : would continue for now and taper slowly: May be send home on 10 mg of steroids:  3/7: doing ok : cont current care: Has CMP and is on Dobutamine Drip  3/8: on dobutamine  3/9: cont dobutamine and night time BiPAP  3/10: cont current treatment : breathing wise he remains tenuous:  3/11: on dobutamine:  3/12: o n dobutamine:  3/13: to cont dobutamine: Pt is on night time bipap: But he has not used it in last few days but would like to have one at home upon dc: May arrange for it : Pt encouraged to use the bipap daily:  3/14: doing ok : cont current treatment: finish steroids on 16th  3/15: doing same: SOB off and on :  3/19: pt uses bipap prn at night: yesterday she did not use it  3/17 stable.  3/18 stable. on room air. on Dobutamine gtt  3/19: cont current care: overall prognosis remains poor: he is getting maximal therapy for CHF as well as supportive treatment for pulmonary fibrosis:  3/20: doingok : would need bipap at home  3/21: stable: cont current care:

## 2018-03-21 NOTE — PROGRESS NOTE ADULT - SUBJECTIVE AND OBJECTIVE BOX
Patient is a 62y old  Male who presents with a chief complaint of SOB x few hours (20 Feb 2018 18:05)      SUBJECTIVE / OVERNIGHT EVENTS: SOB much improved. currently in HD, no new complaints     ROS:  No HA/DZ  No Vision changes   No CP  No N/V/D  No Edema  No Rash  NO weakness, numbness    MEDICATIONS  (STANDING):  ALBUTerol/ipratropium for Nebulization 3 milliLiter(s) Nebulizer every 6 hours  amiodarone    Tablet 100 milliGRAM(s) Oral daily  aspirin enteric coated 81 milliGRAM(s) Oral daily  atorvastatin 80 milliGRAM(s) Oral at bedtime  buDESOnide   90 MICROgram(s) Inhaler 2 Puff(s) Inhalation two times a day  dextrose 5%. 1000 milliLiter(s) (50 mL/Hr) IV Continuous <Continuous>  dextrose 5%. 1000 milliLiter(s) (50 mL/Hr) IV Continuous <Continuous>  dextrose 50% Injectable 12.5 Gram(s) IV Push once  dextrose 50% Injectable 25 Gram(s) IV Push once  dextrose 50% Injectable 25 Gram(s) IV Push once  DOBUTamine Infusion 7.5 MICROgram(s)/kG/Min (16.942 mL/Hr) IV Continuous <Continuous>  docusate sodium 100 milliGRAM(s) Oral two times a day  finasteride 5 milliGRAM(s) Oral daily  influenza   Vaccine 0.5 milliLiter(s) IntraMuscular once  insulin glargine Injectable (LANTUS) 20 Unit(s) SubCutaneous at bedtime  insulin lispro (HumaLOG) corrective regimen sliding scale   SubCutaneous three times a day before meals  insulin lispro (HumaLOG) corrective regimen sliding scale   SubCutaneous at bedtime  levothyroxine 75 MICROGram(s) Oral daily  midodrine 30 milliGRAM(s) Oral three times a day  MIRACLE MOUTHWASH 30 milliLiter(s) Swish and Spit three times a day  Nephro-lynda 1 Tablet(s) Oral daily  polyethylene glycol 3350 17 Gram(s) Oral daily  Saliva Substitute (CAPHOSOL) 30 milliLiter(s) Swish and Spit every 6 hours  senna 2 Tablet(s) Oral at bedtime    MEDICATIONS  (PRN):  benzocaine 15 mG/menthol 3.6 mG Lozenge 1 Lozenge Oral every 6 hours PRN Sore Throat  dextrose Gel 1 Dose(s) Oral once PRN Blood Glucose LESS THAN 70 milliGRAM(s)/deciLiter  dextrose Gel 1 Dose(s) Oral once PRN Blood Glucose LESS THAN 70 milliGRAM(s)/deciliter  glucagon  Injectable 1 milliGRAM(s) IntraMuscular once PRN Glucose <70 milliGRAM(s)/deciLiter  guaiFENesin    Syrup 100 milliGRAM(s) Oral every 6 hours PRN Cough  guaiFENesin   Syrup  (Sugar-Free) 200 milliGRAM(s) Oral every 6 hours PRN Cough  sodium chloride 0.65% Nasal 1 Spray(s) Both Nostrils every 2 hours PRN Nasal Congestion      T(C): 36.2 (03-21-18 @ 10:56)  HR: 84 (03-21-18 @ 11:33)  BP: 94/56 (03-21-18 @ 10:56)  RR: 18 (03-21-18 @ 10:56)  SpO2: 97% (03-21-18 @ 11:33)  CAPILLARY BLOOD GLUCOSE      POCT Blood Glucose.: 139 mg/dL (21 Mar 2018 09:11)  POCT Blood Glucose.: 109 mg/dL (21 Mar 2018 05:46)  POCT Blood Glucose.: 188 mg/dL (20 Mar 2018 21:52)  POCT Blood Glucose.: 183 mg/dL (20 Mar 2018 18:06)  POCT Blood Glucose.: 139 mg/dL (20 Mar 2018 12:05)    I&O's Summary    21 Mar 2018 07:01  -  21 Mar 2018 12:04  --------------------------------------------------------  IN: 500 mL / OUT: 3500 mL / NET: -3000 mL        PHYSICAL EXAM:  GENERAL: NAD, well-developed, AOx3  HEAD:  Atraumatic, Normocephalic  EYES: EOMI, PERRL, conjunctiva and sclera clear  NECK: Supple, No JVD  CHEST/LUNG: Clear to auscultation bilaterally  HEART: Regular rate and rhythm; No murmurs, rubs, or gallops, +1-2 LE edema, improved   ABDOMEN: Soft, Nontender, Nondistended; Bowel sounds present  EXTREMITIES:  2+ Peripheral Pulses, No clubbing, cyanosis  PSYCH: No SI/HI  NEUROLOGY: non-focal  SKIN: No rashes or lesions    LABS:                        10.7   8.16  )-----------( 98       ( 21 Mar 2018 06:45 )             33.4     03-21    138  |  96<L>  |  64<H>  ----------------------------<  128<H>  4.0   |  26  |  4.97<H>    Ca    8.6      21 Mar 2018 06:45  Mg     2.0     03-21      PT/INR - ( 21 Mar 2018 06:45 )   PT: 35.9 SEC;   INR: 3.05          PTT - ( 21 Mar 2018 06:45 )  PTT:51.0 SEC              RADIOLOGY & ADDITIONAL TESTS:    Imaging Personally Reviewed:    Consultant(s) Notes Reviewed:      Care Discussed with Consultants/Other Providers:

## 2018-03-21 NOTE — PROGRESS NOTE ADULT - SUBJECTIVE AND OBJECTIVE BOX
Pt seen and examined at bedside    feels well  completed hd earlier today, tolerated well  no new compls  dyspnea better  no chest pain,dizziness  no nausea,vomiting,diarrea      Allergies:  No Known Allergies    Hospital Medications:   MEDICATIONS  (STANDING):  ALBUTerol/ipratropium for Nebulization 3 milliLiter(s) Nebulizer every 6 hours  amiodarone    Tablet 100 milliGRAM(s) Oral daily  aspirin enteric coated 81 milliGRAM(s) Oral daily  atorvastatin 80 milliGRAM(s) Oral at bedtime  buDESOnide   90 MICROgram(s) Inhaler 2 Puff(s) Inhalation two times a day  dextrose 5%. 1000 milliLiter(s) (50 mL/Hr) IV Continuous <Continuous>  dextrose 5%. 1000 milliLiter(s) (50 mL/Hr) IV Continuous <Continuous>  dextrose 50% Injectable 12.5 Gram(s) IV Push once  dextrose 50% Injectable 25 Gram(s) IV Push once  dextrose 50% Injectable 25 Gram(s) IV Push once  DOBUTamine Infusion 7.5 MICROgram(s)/kG/Min (16.942 mL/Hr) IV Continuous <Continuous>  docusate sodium 100 milliGRAM(s) Oral two times a day  finasteride 5 milliGRAM(s) Oral daily  influenza   Vaccine 0.5 milliLiter(s) IntraMuscular once  insulin glargine Injectable (LANTUS) 20 Unit(s) SubCutaneous at bedtime  insulin lispro (HumaLOG) corrective regimen sliding scale   SubCutaneous three times a day before meals  insulin lispro (HumaLOG) corrective regimen sliding scale   SubCutaneous at bedtime  levothyroxine 75 MICROGram(s) Oral daily  midodrine 30 milliGRAM(s) Oral three times a day  MIRACLE MOUTHWASH 30 milliLiter(s) Swish and Spit three times a day  Nephro-lynda 1 Tablet(s) Oral daily  polyethylene glycol 3350 17 Gram(s) Oral daily  Saliva Substitute (CAPHOSOL) 30 milliLiter(s) Swish and Spit every 6 hours  senna 2 Tablet(s) Oral at bedtime         VITALS:  Vital Signs Last 24 Hrs  T(C): 36.2 (21 Mar 2018 10:56), Max: 36.7 (20 Mar 2018 21:16)  T(F): 97.1 (21 Mar 2018 10:56), Max: 98.1 (21 Mar 2018 01:19)  HR: 84 (21 Mar 2018 11:33) (71 - 87)  BP: 94/56 (21 Mar 2018 10:56) (87/63 - 117/73)  BP(mean): --  RR: 18 (21 Mar 2018 10:56) (17 - 18)  SpO2: 97% (21 Mar 2018 11:33) (90% - 97%)    I&O's Summary    21 Mar 2018 07:01  -  21 Mar 2018 13:37  --------------------------------------------------------  IN: 500 mL / OUT: 3500 mL / NET: -3000 mL      PHYSICAL EXAM:  Constitutional: NAD, using nasal O2  HEENT: anicteric sclera, oropharynx clear, MMM  Neck: No JVD  Respiratory: few fine scattered crepts  Cardiovascular: S1, S2, irreg  Gastrointestinal: BS+, soft, NT/ND  Extremities: No cyanosis or clubbing. 2+ leg edema, improved since previously  Neurological: A/O x 3, no focal deficits  Psychiatric: Normal mood, normal affect  : No CVA tenderness. No rosa.   Skin: No rashes  Vascular Access: rt IJ PC+    LABS:  03-21    138  |  96<L>  |  64<H>  ----------------------------<  128<H>  4.0   |  26  |  4.97<H>    Ca    8.6      21 Mar 2018 06:45  Mg     2.0     03-21                          10.7   8.16  )-----------( 98       ( 21 Mar 2018 06:45 )             33.4       Urine Studies:      RADIOLOGY & ADDITIONAL STUDIES:

## 2018-03-22 DIAGNOSIS — R04.2 HEMOPTYSIS: ICD-10-CM

## 2018-03-22 LAB
BUN SERPL-MCNC: 52 MG/DL — HIGH (ref 7–23)
CALCIUM SERPL-MCNC: 8.7 MG/DL — SIGNIFICANT CHANGE UP (ref 8.4–10.5)
CHLORIDE SERPL-SCNC: 93 MMOL/L — LOW (ref 98–107)
CO2 SERPL-SCNC: 29 MMOL/L — SIGNIFICANT CHANGE UP (ref 22–31)
CREAT SERPL-MCNC: 3.87 MG/DL — HIGH (ref 0.5–1.3)
GLUCOSE BLDC GLUCOMTR-MCNC: 126 MG/DL — HIGH (ref 70–99)
GLUCOSE BLDC GLUCOMTR-MCNC: 187 MG/DL — HIGH (ref 70–99)
GLUCOSE BLDC GLUCOMTR-MCNC: 193 MG/DL — HIGH (ref 70–99)
GLUCOSE BLDC GLUCOMTR-MCNC: 225 MG/DL — HIGH (ref 70–99)
GLUCOSE BLDC GLUCOMTR-MCNC: 99 MG/DL — SIGNIFICANT CHANGE UP (ref 70–99)
GLUCOSE SERPL-MCNC: 139 MG/DL — HIGH (ref 70–99)
HCT VFR BLD CALC: 32 % — LOW (ref 39–50)
HGB BLD-MCNC: 10 G/DL — LOW (ref 13–17)
INR BLD: 2.25 — HIGH (ref 0.88–1.17)
MAGNESIUM SERPL-MCNC: 2.1 MG/DL — SIGNIFICANT CHANGE UP (ref 1.6–2.6)
MCHC RBC-ENTMCNC: 31 PG — SIGNIFICANT CHANGE UP (ref 27–34)
MCHC RBC-ENTMCNC: 31.3 % — LOW (ref 32–36)
MCV RBC AUTO: 99.1 FL — SIGNIFICANT CHANGE UP (ref 80–100)
NRBC # FLD: 0 — SIGNIFICANT CHANGE UP
PLATELET # BLD AUTO: 95 K/UL — LOW (ref 150–400)
PMV BLD: 13.4 FL — HIGH (ref 7–13)
POTASSIUM SERPL-MCNC: 3.6 MMOL/L — SIGNIFICANT CHANGE UP (ref 3.5–5.3)
POTASSIUM SERPL-SCNC: 3.6 MMOL/L — SIGNIFICANT CHANGE UP (ref 3.5–5.3)
PROTHROM AB SERPL-ACNC: 25.4 SEC — HIGH (ref 9.8–13.1)
RBC # BLD: 3.23 M/UL — LOW (ref 4.2–5.8)
RBC # FLD: 18.1 % — HIGH (ref 10.3–14.5)
SODIUM SERPL-SCNC: 136 MMOL/L — SIGNIFICANT CHANGE UP (ref 135–145)
WBC # BLD: 7.46 K/UL — SIGNIFICANT CHANGE UP (ref 3.8–10.5)
WBC # FLD AUTO: 7.46 K/UL — SIGNIFICANT CHANGE UP (ref 3.8–10.5)

## 2018-03-22 PROCEDURE — 99497 ADVNCD CARE PLAN 30 MIN: CPT

## 2018-03-22 PROCEDURE — 99233 SBSQ HOSP IP/OBS HIGH 50: CPT

## 2018-03-22 PROCEDURE — 71250 CT THORAX DX C-: CPT | Mod: 26

## 2018-03-22 RX ORDER — WARFARIN SODIUM 2.5 MG/1
3 TABLET ORAL ONCE
Qty: 0 | Refills: 0 | Status: COMPLETED | OUTPATIENT
Start: 2018-03-22 | End: 2018-03-22

## 2018-03-22 RX ORDER — DOBUTAMINE HCL 250MG/20ML
7.5 VIAL (ML) INTRAVENOUS
Qty: 1 | Refills: 0 | OUTPATIENT
Start: 2018-03-22 | End: 2018-04-20

## 2018-03-22 RX ADMIN — DIPHENHYDRAMINE HYDROCHLORIDE AND LIDOCAINE HYDROCHLORIDE AND ALUMINUM HYDROXIDE AND MAGNESIUM HYDRO 30 MILLILITER(S): KIT at 05:36

## 2018-03-22 RX ADMIN — INSULIN GLARGINE 20 UNIT(S): 100 INJECTION, SOLUTION SUBCUTANEOUS at 23:00

## 2018-03-22 RX ADMIN — Medication 100 MILLIGRAM(S): at 05:31

## 2018-03-22 RX ADMIN — ATORVASTATIN CALCIUM 80 MILLIGRAM(S): 80 TABLET, FILM COATED ORAL at 22:14

## 2018-03-22 RX ADMIN — Medication 75 MICROGRAM(S): at 05:31

## 2018-03-22 RX ADMIN — AMIODARONE HYDROCHLORIDE 100 MILLIGRAM(S): 400 TABLET ORAL at 05:31

## 2018-03-22 RX ADMIN — Medication 30 MILLILITER(S): at 05:31

## 2018-03-22 RX ADMIN — MIDODRINE HYDROCHLORIDE 30 MILLIGRAM(S): 2.5 TABLET ORAL at 22:13

## 2018-03-22 RX ADMIN — Medication 81 MILLIGRAM(S): at 13:31

## 2018-03-22 RX ADMIN — MIDODRINE HYDROCHLORIDE 30 MILLIGRAM(S): 2.5 TABLET ORAL at 05:32

## 2018-03-22 RX ADMIN — Medication 100 MILLIGRAM(S): at 17:48

## 2018-03-22 RX ADMIN — DIPHENHYDRAMINE HYDROCHLORIDE AND LIDOCAINE HYDROCHLORIDE AND ALUMINUM HYDROXIDE AND MAGNESIUM HYDRO 30 MILLILITER(S): KIT at 22:16

## 2018-03-22 RX ADMIN — WARFARIN SODIUM 3 MILLIGRAM(S): 2.5 TABLET ORAL at 17:48

## 2018-03-22 RX ADMIN — MIDODRINE HYDROCHLORIDE 30 MILLIGRAM(S): 2.5 TABLET ORAL at 13:32

## 2018-03-22 RX ADMIN — Medication 2 PUFF(S): at 22:15

## 2018-03-22 RX ADMIN — Medication 3 MILLILITER(S): at 16:46

## 2018-03-22 RX ADMIN — Medication 30 MILLILITER(S): at 17:48

## 2018-03-22 RX ADMIN — SENNA PLUS 2 TABLET(S): 8.6 TABLET ORAL at 22:14

## 2018-03-22 RX ADMIN — Medication 1 TABLET(S): at 13:32

## 2018-03-22 RX ADMIN — Medication 3 MILLILITER(S): at 04:41

## 2018-03-22 RX ADMIN — Medication 2: at 13:33

## 2018-03-22 RX ADMIN — Medication 1: at 18:14

## 2018-03-22 RX ADMIN — DIPHENHYDRAMINE HYDROCHLORIDE AND LIDOCAINE HYDROCHLORIDE AND ALUMINUM HYDROXIDE AND MAGNESIUM HYDRO 30 MILLILITER(S): KIT at 13:33

## 2018-03-22 RX ADMIN — FINASTERIDE 5 MILLIGRAM(S): 5 TABLET, FILM COATED ORAL at 13:32

## 2018-03-22 RX ADMIN — Medication 30 MILLILITER(S): at 13:32

## 2018-03-22 RX ADMIN — Medication 3 MILLILITER(S): at 21:39

## 2018-03-22 NOTE — PROGRESS NOTE ADULT - PROBLEM SELECTOR PLAN 5
- c/w supportive care, supplemental oxygen  - dark sputum likely due to COPD and PF. no clinical evidence of PNA. defer CT chest to out-pt, though it doesn't seem like there is any hemoptysis

## 2018-03-22 NOTE — PROVIDER CONTACT NOTE (OTHER) - SITUATION
pt c/o SOB, diff breathing  while on venti mask .. pt remained w/ 95% o2 sat
Patient complaining of feeling restless and uncomfortable. Blood glucose 69.
Patient had 4 beats of v-tach on tele monitor.
Patient had 4 beats of v-tach.
Patient had 5 beats of v-tach
Patient had 5 beats of v-tach on the tele monitor.
Patient is refusing to wear BiPAP mask overnight despite education. Patient states "It makes my mouth and lips dry I don't want it.".
Patient with infrequent, productive cough expectorating brown and blood tinged thick sputum
pt admitted from home with dobutamine drip
pt repeat blood sugar 89 after 2 oral PO treatments

## 2018-03-22 NOTE — PROVIDER CONTACT NOTE (OTHER) - BACKGROUND
Admitted for shortness of breath, flu +. PMH diabetes mellitus type 2.
Patient admitted for shortness of breath 2/2 fluid overload. PMH a-fib on coumadin, ICD placement, PICC line for at home dobutamine drip.
Patient admitted for shortness of breath, +influenza, +acute on chronic CHF. PMH a-fib on coumadin.
Pt admitted for shortness of breath, past medical history of pulmonary fibrosis.
adm to the hospital 2/13/18 for sob  eshd pt acw permacath
admitted for SOB. flu +
Patient admitted for shortness of breath, +influenza s/p tamiflu, +acute on chronic CHF. PMH of COPD and pulmonary fibrosis, on 4-5L NC at home.
Patient admitted for shortness of breath, +influenza s/p tamiflu, +acute on chronic CHF. PMH of a-fib, COPD, and pulmonary fibrosis, on 4-5L NC at home.
Patient admitted for shortness of breath, +influenza, +acute on chronic CHF. PMH a-fib on coumadin.

## 2018-03-22 NOTE — PROGRESS NOTE ADULT - SUBJECTIVE AND OBJECTIVE BOX
Patient is a 62y old  Male who presents with a chief complaint of SOB x few hours (20 Feb 2018 18:05)      Any change in ROS: scant hemoptysis today   tome he says he has coughed up black sputum and not red:     MEDICATIONS  (STANDING):  ALBUTerol/ipratropium for Nebulization 3 milliLiter(s) Nebulizer every 6 hours  amiodarone    Tablet 100 milliGRAM(s) Oral daily  aspirin enteric coated 81 milliGRAM(s) Oral daily  atorvastatin 80 milliGRAM(s) Oral at bedtime  buDESOnide   90 MICROgram(s) Inhaler 2 Puff(s) Inhalation two times a day  dextrose 5%. 1000 milliLiter(s) (50 mL/Hr) IV Continuous <Continuous>  dextrose 5%. 1000 milliLiter(s) (50 mL/Hr) IV Continuous <Continuous>  dextrose 50% Injectable 12.5 Gram(s) IV Push once  dextrose 50% Injectable 25 Gram(s) IV Push once  dextrose 50% Injectable 25 Gram(s) IV Push once  DOBUTamine Infusion 7.5 MICROgram(s)/kG/Min (16.942 mL/Hr) IV Continuous <Continuous>  docusate sodium 100 milliGRAM(s) Oral two times a day  finasteride 5 milliGRAM(s) Oral daily  influenza   Vaccine 0.5 milliLiter(s) IntraMuscular once  insulin glargine Injectable (LANTUS) 20 Unit(s) SubCutaneous at bedtime  insulin lispro (HumaLOG) corrective regimen sliding scale   SubCutaneous three times a day before meals  insulin lispro (HumaLOG) corrective regimen sliding scale   SubCutaneous at bedtime  levothyroxine 75 MICROGram(s) Oral daily  midodrine 30 milliGRAM(s) Oral three times a day  MIRACLE MOUTHWASH 30 milliLiter(s) Swish and Spit three times a day  Nephro-lynda 1 Tablet(s) Oral daily  polyethylene glycol 3350 17 Gram(s) Oral daily  Saliva Substitute (CAPHOSOL) 30 milliLiter(s) Swish and Spit every 6 hours  senna 2 Tablet(s) Oral at bedtime    MEDICATIONS  (PRN):  benzocaine 15 mG/menthol 3.6 mG Lozenge 1 Lozenge Oral every 6 hours PRN Sore Throat  dextrose Gel 1 Dose(s) Oral once PRN Blood Glucose LESS THAN 70 milliGRAM(s)/deciLiter  dextrose Gel 1 Dose(s) Oral once PRN Blood Glucose LESS THAN 70 milliGRAM(s)/deciliter  glucagon  Injectable 1 milliGRAM(s) IntraMuscular once PRN Glucose <70 milliGRAM(s)/deciLiter  guaiFENesin    Syrup 100 milliGRAM(s) Oral every 6 hours PRN Cough  guaiFENesin   Syrup  (Sugar-Free) 200 milliGRAM(s) Oral every 6 hours PRN Cough  sodium chloride 0.65% Nasal 1 Spray(s) Both Nostrils every 2 hours PRN Nasal Congestion    Vital Signs Last 24 Hrs  T(C): 36.7 (22 Mar 2018 17:30), Max: 36.9 (21 Mar 2018 20:49)  T(F): 98 (22 Mar 2018 17:30), Max: 98.4 (21 Mar 2018 20:49)  HR: 91 (22 Mar 2018 19:19) (70 - 93)  BP: 102/52 (22 Mar 2018 17:30) (86/52 - 123/63)  BP(mean): --  RR: 18 (22 Mar 2018 17:30) (18 - 18)  SpO2: 96% (22 Mar 2018 19:19) (90% - 97%)    I&O's Summary    21 Mar 2018 07:01  -  22 Mar 2018 07:00  --------------------------------------------------------  IN: 500 mL / OUT: 3500 mL / NET: -3000 mL    22 Mar 2018 07:01  -  22 Mar 2018 19:21  --------------------------------------------------------  IN: 400 mL / OUT: 3900 mL / NET: -3500 mL          Physical Exam:   GENERAL: NAD, well-groomed, well-developed  HEENT: BELL/   Atraumatic, Normocephalic  ENMT: No tonsillar erythema, exudates, or enlargement; Moist mucous membranes, Good dentition, No lesions  NECK: Supple, No JVD, Normal thyroid  CHEST/LUNG: scattered crackles   CVS: Regular rate and rhythm; No murmurs, rubs, or gallops  GI: : Soft, Nontender, Nondistended; Bowel sounds present  NERVOUS SYSTEM:  Alert & Oriented X3  EXTREMITIES:  2+ edema  LYMPH: No lymphadenopathy noted  SKIN: No rashes or lesions  ENDOCRINOLOGY: No Thyromegaly  PSYCH: Appropriate    Labs:                              10.0   7.46  )-----------( 95       ( 22 Mar 2018 06:20 )             32.0                         10.7   8.16  )-----------( 98       ( 21 Mar 2018 06:45 )             33.4                         11.0   8.87  )-----------( 104      ( 20 Mar 2018 05:25 )             35.7                         11.2   7.73  )-----------( 100      ( 19 Mar 2018 05:40 )             35.2     03-22    136  |  93<L>  |  52<H>  ----------------------------<  139<H>  3.6   |  29  |  3.87<H>  03-21    138  |  96<L>  |  64<H>  ----------------------------<  128<H>  4.0   |  26  |  4.97<H>  03-20    138  |  96<L>  |  41<H>  ----------------------------<  115<H>  3.6   |  27  |  3.60<H>  03-19    138  |  94<L>  |  68<H>  ----------------------------<  60<L>  3.8   |  25  |  5.09<H>    Ca    8.7      22 Mar 2018 06:20  Ca    8.6      21 Mar 2018 06:45  Mg     2.1     03-22  Mg     2.0     03-21      CAPILLARY BLOOD GLUCOSE      POCT Blood Glucose.: 187 mg/dL (22 Mar 2018 17:44)  POCT Blood Glucose.: 225 mg/dL (22 Mar 2018 13:06)  POCT Blood Glucose.: 126 mg/dL (22 Mar 2018 08:04)  POCT Blood Glucose.: 99 mg/dL (22 Mar 2018 05:27)  POCT Blood Glucose.: 199 mg/dL (21 Mar 2018 23:28)  POCT Blood Glucose.: 124 mg/dL (21 Mar 2018 22:18)        PT/INR - ( 22 Mar 2018 06:20 )   PT: 25.4 SEC;   INR: 2.25          PTT - ( 21 Mar 2018 06:45 )  PTT:51.0 SEC    Cultures:             < from: CT Chest No Cont (03.22.18 @ 16:15) >  PROCEDURE:   CT of the Chest was performed without intravenous contrast.  Sagittal and coronal reformats were performed.    FINDINGS:    LUNGS AND LARGE AIRWAYS: Unchanged traction bronchiectasis and bilateral   groundglass opacities.  PLEURA: Small right pleural effusion.  VESSELS: Right-sided central venous catheter with tip terminating in the   right atrium.   HEART: Cardiomegaly. Left chest wall ICD. No pericardial effusion.  MEDIASTINUM AND ASH: No lymphadenopathy.  CHEST WALL AND LOWER NECK: Within normal limits.  VISUALIZED UPPER ABDOMEN: Partially imaged dense material inside the   gallbladder, possibly gallstones.  BONES: Degenerative changes of the spine.    IMPRESSION:   Unchanged interstitial lung disease. No new consolidation.                  KELVIN CARMONA M.D., ATTENDING RADIOLOGIST  This document has been electronically signed. Mar 22 2018  4:42PM        < end of copied text >                  Studies  Chest X-RAY  CT SCAN Chest   Venous Dopplers: LE:   CT Abdomen  Others

## 2018-03-22 NOTE — PROVIDER CONTACT NOTE (OTHER) - NAME OF MD/NP/PA/DO NOTIFIED:
MD Marcos
Belkis VASQUES
[facility] [id number]
Chloe VASQUES
Dr Davis and Rolf Orozco NP
Flores VASQUES
Renetta Breen MD
Tele LAYO Tanner
Yue VASQUES
Yue VASQUES
ajit burks MD

## 2018-03-22 NOTE — PROVIDER CONTACT NOTE (OTHER) - ASSESSMENT
Pt having infrequent productive cough, sputum on assessment is thick, brown and blood-tinged, the size of a dime.

## 2018-03-22 NOTE — PROGRESS NOTE ADULT - PROBLEM SELECTOR PLAN 1
tosday [t somplained of scanth emoptysis forpast twoweeks: He never told me prior to today that he has been coughing up blood: On repeat questioning today , he says he has been coughing  up black sputum and not red sputum and that too very scant: Today it hs increased in quantity: Rpt ct scan today with no change and there is no indication of alveolar hemorrhage: Scan hemoptysis probably due to chf as well as coumadin:

## 2018-03-22 NOTE — PROGRESS NOTE ADULT - PROBLEM SELECTOR PLAN 8
- I had further discussion with pt today, face to face for 30 min. Pt wishes to continue with all aggressive measures; states that even if he's dependent on machines, he wants to do everything to stay alive.  - Very poor prognosis, high chance of decompensation after discharge.  Family fully aware of pt's decisions. DC planning for today post HD if feels better. kiera DRAKE and CHANDAN - I had further discussion with pt today, face to face for 30 min. Pt wishes to continue with all aggressive measures; states that even if he's dependent on machines, he wants to do everything to stay alive.  - Very poor prognosis, high chance of decompensation after discharge.  Family fully aware of pt's decisions. DC planning for today post HD if feels better. dw SW and CM  - dw renal, can accommodate 4 x HD weekly, renal ok with discharge. early HD rell AM and DC

## 2018-03-22 NOTE — PROVIDER CONTACT NOTE (OTHER) - REASON
Blood glucose 69
Patient expectorating brown and blood tinged thick sputum
Patient refusing BiPAP
Pt had 4 beats V tach
blood sugar repeat 89
picc line left arm no blood return and not flushing
v-tach
v-tach on tele
pt c/o diff breathing & tachypnea observed

## 2018-03-22 NOTE — PROGRESS NOTE ADULT - PROBLEM SELECTOR PLAN 6
- Hypercoagulable state 2/2 chronic Afib   - HR at goal. Not a candidate for beta blockers given chronic hypotension.  - On warfarin.  INR now therapeutic, resume Coumadin   - c/w amiodarone; as per discussion with Dr. Davis, would continue with Amiodarone despite pulmonary fibrosis.

## 2018-03-22 NOTE — PROGRESS NOTE ADULT - PROBLEM SELECTOR PLAN 2
with recurrent SOB again today, undergoing extra HD session this morning. will reassess after HD today. no clear endpoint as he tends to decompensated quite rapidly and wants all aggressive measures. no other means of volume management except HD which is also at times limited in volume removal due to chronic hypotension. will attempt to DC if feels better after HD today

## 2018-03-22 NOTE — PROGRESS NOTE ADULT - PROBLEM SELECTOR PLAN 1
completed HD earlier today, rx sheet reviewed, net uf 3.5L achieved, bp low but tolerated it well.  repeat HD tomorrow.  c/w renal diet, fluid restriction 1.2L/day, reinforced w/pt  ok to d/c from renal stand point; doubt pt can improved any more clinically.  chronic volume overload, will provide extra PUF prn, outpt based on availability.   end stage heart failure and pulm fibrosis. Pt resistant to withdrawing any care at this time.

## 2018-03-22 NOTE — PROGRESS NOTE ADULT - PROBLEM SELECTOR PLAN 3
3/5: taper off prednisone: already ordered  3*6: cont steroids  3/7: doing pretty poor: cont steroids trial for some time  3/8: on steroids at tis time: low dose steroids  3/9: It is difficult to discern if steroids are helpful in him: However the pt as well as the sister insists that we cont to try steroids: They think he feels a little better with it: Side effects have been explained to them:  3/10: cont 10 mg today too ? decrease to 5 mg soon  3/11: on steroids:  3/12: pt is on 5 mg of steroids: will taper  to off on next few days:  3/13: pt has been doing poorly: cont prednisone till 16 of March.  3/14: don't think steroids are helping much : will finish soon:  3/16: no significant benefit from steroids: DC steroids:   3/15:has extensive fibrosis: taper steroids to off tomorrow:  3/16 off steroid  3/18 monitor.  3/19: Off steroids  3/20: I don't see any need for steorids::  3/21: stable: cont conservative management  3/22:stable :cont current conservtive management

## 2018-03-22 NOTE — PROVIDER CONTACT NOTE (OTHER) - DATE AND TIME:
16-Feb-2018 14:45
02-Mar-2018 05:00
09-Mar-2018 21:39
10-Mar-2018 04:34
11-Mar-2018 03:00
16-Mar-2018 01:57
17-Feb-2018 03:43
17-Feb-2018 04:20
17-Feb-2018 15:30
21-Mar-2018 21:00
28-Feb-2018 02:11

## 2018-03-22 NOTE — PROVIDER CONTACT NOTE (OTHER) - ACTION/TREATMENT ORDERED:
Will continue to monitor sputum, notify provider if patient expectorates blood-tinged sputum again as per tele LAYO Tanner's orders.

## 2018-03-22 NOTE — PROVIDER CONTACT NOTE (OTHER) - RECOMMENDATIONS
placed on non rebreather .. pt reports improvement in breathing .. MD aware . will cont to monitor.
No new interventions at this time. Will continue to monitor patient.
Will continue to monitor patient and reinforce education about the importance of maintaining the BiPAP overnight.
Will continue to monitor patient.
Administer PO intervention repeat blood glucose 15 minutes post intervention.
Continue to monitor and explain to patient the side effects of having pulmonary fibrosis. Notify provider.
Continue to monitor patient.
Will continue to monitor patient.
activate order for IVP dextrose
dr. paniagua aware suggesting IR took care of it

## 2018-03-22 NOTE — PROGRESS NOTE ADULT - PROBLEM SELECTOR PLAN 2
3/4: given risk of increasing retention of salt and water , and he has been on dobutamine: would cut it down to 10 mg tomorrow and slowly taper it to off  3/5: It is very hard to determine whether steroids are beneficially improving his SOB: I doubt that steroids are of much help here: They may also potentiate salt and water retention! Will taper them off!  3/6: today sister says she would like to continue steroids for sometime : would continue for now and taper slowly: May be send home on 10 mg of steroids:  3/7: doing ok : cont current care: Has CMP and is on Dobutamine Drip  3/8: on dobutamine  3/9: cont dobutamine and night time BiPAP  3/10: cont current treatment : breathing wise he remains tenuous:  3/11: on dobutamine:  3/12: o n dobutamine:  3/13: to cont dobutamine: Pt is on night time bipap: But he has not used it in last few days but would like to have one at home upon dc: May arrange for it : Pt encouraged to use the bipap daily:  3/14: doing ok : cont current treatment: finish steroids on 16th  3/15: doing same: SOB off and on :  3/19: pt uses bipap prn at night: yesterday she did not use it  3/17 stable.  3/18 stable. on room air. on Dobutamine gtt  3/19: cont current care: overall prognosis remains poor: he is getting maximal therapy for CHF as well as supportive treatment for pulmonary fibrosis:  3/20: doingok : would need bipap at home  3/21: stable: cont current care:  3/22:contbipap at night

## 2018-03-22 NOTE — PROGRESS NOTE ADULT - PROBLEM SELECTOR PLAN 1
- Multifactorial due to advanced COPD, pulmonary fibrosis and end stage heart failure.  No additional means of therapy will reverse his chronic illnesses at this point.  - Supplemental oxygen to maintain O2 sat >88% s/p completion of prolonged steroid taper  - BiPAP QHS - Multifactorial due to advanced COPD, pulmonary fibrosis and end stage heart failure.  No additional means of therapy will reverse his chronic illnesses at this point.  - Supplemental oxygen to maintain O2 sat >88% s/p completion of prolonged steroid taper  - BiPAP QHS  - scant hemoptysis, per pt ongoing for 2 weeks, likely due to underlying advance lung dz and A/C. will check CT chest non cont r/o pulm hemophage, otherwise, this will not . - Multifactorial due to advanced COPD, pulmonary fibrosis and end stage heart failure.  No additional means of therapy will reverse his chronic illnesses at this point.  - Supplemental oxygen to maintain O2 sat >88% s/p completion of prolonged steroid taper  - BiPAP QHS  - scant hemoptysis, per pt ongoing for 2 weeks, likely due to underlying advance lung dz, chronic CHF with pulm edema, and A/C. will check CT chest non cont r/o pulm hemophage, otherwise, this will not .

## 2018-03-22 NOTE — PROGRESS NOTE ADULT - SUBJECTIVE AND OBJECTIVE BOX
Pt seen and examined at bedside  Dyspnea at baseline. Pt had extra HD this am due to SOB earlier this AM.   Otherwise denies chest pain, or N/V.     Allergies:  No Known Allergies    Hospital Medications:   MEDICATIONS  (STANDING):  ALBUTerol/ipratropium for Nebulization 3 milliLiter(s) Nebulizer every 6 hours  amiodarone    Tablet 100 milliGRAM(s) Oral daily  aspirin enteric coated 81 milliGRAM(s) Oral daily  atorvastatin 80 milliGRAM(s) Oral at bedtime  buDESOnide   90 MICROgram(s) Inhaler 2 Puff(s) Inhalation two times a day  dextrose 5%. 1000 milliLiter(s) (50 mL/Hr) IV Continuous <Continuous>  dextrose 5%. 1000 milliLiter(s) (50 mL/Hr) IV Continuous <Continuous>  dextrose 50% Injectable 12.5 Gram(s) IV Push once  dextrose 50% Injectable 25 Gram(s) IV Push once  dextrose 50% Injectable 25 Gram(s) IV Push once  DOBUTamine Infusion 7.5 MICROgram(s)/kG/Min (16.942 mL/Hr) IV Continuous <Continuous>  docusate sodium 100 milliGRAM(s) Oral two times a day  finasteride 5 milliGRAM(s) Oral daily  influenza   Vaccine 0.5 milliLiter(s) IntraMuscular once  insulin glargine Injectable (LANTUS) 20 Unit(s) SubCutaneous at bedtime  insulin lispro (HumaLOG) corrective regimen sliding scale   SubCutaneous three times a day before meals  insulin lispro (HumaLOG) corrective regimen sliding scale   SubCutaneous at bedtime  levothyroxine 75 MICROGram(s) Oral daily  midodrine 30 milliGRAM(s) Oral three times a day  MIRACLE MOUTHWASH 30 milliLiter(s) Swish and Spit three times a day  Nephro-lynda 1 Tablet(s) Oral daily  polyethylene glycol 3350 17 Gram(s) Oral daily  Saliva Substitute (CAPHOSOL) 30 milliLiter(s) Swish and Spit every 6 hours  senna 2 Tablet(s) Oral at bedtime  warfarin 3 milliGRAM(s) Oral once    VITALS:  T(F): 97.7 (03-22-18 @ 13:36), Max: 98.4 (03-21-18 @ 20:49)  HR: 76 (03-22-18 @ 13:36)  BP: 100/54 (03-22-18 @ 13:36)  RR: 18 (03-22-18 @ 13:36)  SpO2: 90% (03-22-18 @ 13:36)  Wt(kg): --    03-21 @ 07:01  -  03-22 @ 07:00  --------------------------------------------------------  IN: 500 mL / OUT: 3500 mL / NET: -3000 mL    03-22 @ 07:01  -  03-22 @ 15:28  --------------------------------------------------------  IN: 400 mL / OUT: 3900 mL / NET: -3500 mL    PHYSICAL EXAM:  Constitutional: NAD  HEENT: anicteric sclera, oropharynx clear, MMM  Neck: No JVD  Respiratory: fine b/l crackles.   Cardiovascular: S1, S2, RRR  Gastrointestinal: BS+, soft, NT/ND  Extremities: No cyanosis or clubbing. 2+peripheral edema  Neurological: A/O x 3, no focal deficits  Psychiatric: Normal mood, normal affect  : No CVA tenderness. No rosa.   Skin: No rashes  Vascular Access: [facility] tunneled cath     LABS:  03-22    136  |  93<L>  |  52<H>  ----------------------------<  139<H>  3.6   |  29  |  3.87<H>    Ca    8.7      22 Mar 2018 06:20  Mg     2.1     03-22      Creatinine Trend: 3.87 <--, 4.97 <--, 3.60 <--, 5.09 <--, 4.09 <--, 5.61 <--, 4.27 <--                        10.0   7.46  )-----------( 95       ( 22 Mar 2018 06:20 )             32.0     Urine Studies:      RADIOLOGY & ADDITIONAL STUDIES:

## 2018-03-22 NOTE — PROGRESS NOTE ADULT - SUBJECTIVE AND OBJECTIVE BOX
Patient is a 62y old  Male who presents with a chief complaint of SOB x few hours (20 Feb 2018 18:05)      SUBJECTIVE / OVERNIGHT EVENTS: this morning was SOB, in HD. pt states he has coughed dark sputum for last couple of weeks. denies blood.     ROS:  No HA/DZ  No Vision changes   No N/V/D  No Rash  NO weakness, numbness    MEDICATIONS  (STANDING):  ALBUTerol/ipratropium for Nebulization 3 milliLiter(s) Nebulizer every 6 hours  amiodarone    Tablet 100 milliGRAM(s) Oral daily  aspirin enteric coated 81 milliGRAM(s) Oral daily  atorvastatin 80 milliGRAM(s) Oral at bedtime  buDESOnide   90 MICROgram(s) Inhaler 2 Puff(s) Inhalation two times a day  dextrose 5%. 1000 milliLiter(s) (50 mL/Hr) IV Continuous <Continuous>  dextrose 5%. 1000 milliLiter(s) (50 mL/Hr) IV Continuous <Continuous>  dextrose 50% Injectable 12.5 Gram(s) IV Push once  dextrose 50% Injectable 25 Gram(s) IV Push once  dextrose 50% Injectable 25 Gram(s) IV Push once  DOBUTamine Infusion 7.5 MICROgram(s)/kG/Min (16.942 mL/Hr) IV Continuous <Continuous>  docusate sodium 100 milliGRAM(s) Oral two times a day  finasteride 5 milliGRAM(s) Oral daily  influenza   Vaccine 0.5 milliLiter(s) IntraMuscular once  insulin glargine Injectable (LANTUS) 20 Unit(s) SubCutaneous at bedtime  insulin lispro (HumaLOG) corrective regimen sliding scale   SubCutaneous three times a day before meals  insulin lispro (HumaLOG) corrective regimen sliding scale   SubCutaneous at bedtime  levothyroxine 75 MICROGram(s) Oral daily  midodrine 30 milliGRAM(s) Oral three times a day  MIRACLE MOUTHWASH 30 milliLiter(s) Swish and Spit three times a day  Nephro-lynda 1 Tablet(s) Oral daily  polyethylene glycol 3350 17 Gram(s) Oral daily  Saliva Substitute (CAPHOSOL) 30 milliLiter(s) Swish and Spit every 6 hours  senna 2 Tablet(s) Oral at bedtime    MEDICATIONS  (PRN):  benzocaine 15 mG/menthol 3.6 mG Lozenge 1 Lozenge Oral every 6 hours PRN Sore Throat  dextrose Gel 1 Dose(s) Oral once PRN Blood Glucose LESS THAN 70 milliGRAM(s)/deciLiter  dextrose Gel 1 Dose(s) Oral once PRN Blood Glucose LESS THAN 70 milliGRAM(s)/deciliter  glucagon  Injectable 1 milliGRAM(s) IntraMuscular once PRN Glucose <70 milliGRAM(s)/deciLiter  guaiFENesin    Syrup 100 milliGRAM(s) Oral every 6 hours PRN Cough  guaiFENesin   Syrup  (Sugar-Free) 200 milliGRAM(s) Oral every 6 hours PRN Cough  sodium chloride 0.65% Nasal 1 Spray(s) Both Nostrils every 2 hours PRN Nasal Congestion      T(C): 36.7 (03-22-18 @ 10:25)  HR: 93 (03-22-18 @ 10:59)  BP: 123/63 (03-22-18 @ 10:25)  RR: 18 (03-22-18 @ 10:25)  SpO2: 97% (03-22-18 @ 10:59)  CAPILLARY BLOOD GLUCOSE      POCT Blood Glucose.: 126 mg/dL (22 Mar 2018 08:04)  POCT Blood Glucose.: 99 mg/dL (22 Mar 2018 05:27)  POCT Blood Glucose.: 199 mg/dL (21 Mar 2018 23:28)  POCT Blood Glucose.: 124 mg/dL (21 Mar 2018 22:18)  POCT Blood Glucose.: 123 mg/dL (21 Mar 2018 17:34)  POCT Blood Glucose.: 190 mg/dL (21 Mar 2018 14:39)  POCT Blood Glucose.: 247 mg/dL (21 Mar 2018 13:34)    I&O's Summary    21 Mar 2018 07:01  -  22 Mar 2018 07:00  --------------------------------------------------------  IN: 500 mL / OUT: 3500 mL / NET: -3000 mL    22 Mar 2018 07:01  -  22 Mar 2018 11:07  --------------------------------------------------------  IN: 400 mL / OUT: 3900 mL / NET: -3500 mL        PHYSICAL EXAM:  GENERAL: NAD, well-developed, AOx3  HEAD:  Atraumatic, Normocephalic  EYES: EOMI, PERRL, conjunctiva and sclera clear  NECK: Supple, No JVD  CHEST/LUNG: LB crackles   HEART: Regular rate and rhythm; No murmurs, rubs, or gallops, + 2 LE pitting Edema  ABDOMEN: Soft, Nontender, Nondistended; Bowel sounds present  EXTREMITIES: no cyanosis     PSYCH: No SI/HI  NEUROLOGY: non-focal  SKIN: No rashes or lesions    LABS:                        10.0   7.46  )-----------( 95       ( 22 Mar 2018 06:20 )             32.0     03-22    136  |  93<L>  |  52<H>  ----------------------------<  139<H>  3.6   |  29  |  3.87<H>    Ca    8.7      22 Mar 2018 06:20  Mg     2.1     03-22      PT/INR - ( 22 Mar 2018 06:20 )   PT: 25.4 SEC;   INR: 2.25          PTT - ( 21 Mar 2018 06:45 )  PTT:51.0 SEC              RADIOLOGY & ADDITIONAL TESTS:    Imaging Personally Reviewed:    Consultant(s) Notes Reviewed:      Care Discussed with Consultants/Other Providers: Patient is a 62y old  Male who presents with a chief complaint of SOB x few hours (20 Feb 2018 18:05)      SUBJECTIVE / OVERNIGHT EVENTS: this morning was SOB, in HD. pt states he has coughed dark sputum for last couple of weeks. denies blood. i examined the sputum, appears scant hemoptysis     ROS:  No HA/DZ  No Vision changes   No N/V/D  No Rash  NO weakness, numbness    MEDICATIONS  (STANDING):  ALBUTerol/ipratropium for Nebulization 3 milliLiter(s) Nebulizer every 6 hours  amiodarone    Tablet 100 milliGRAM(s) Oral daily  aspirin enteric coated 81 milliGRAM(s) Oral daily  atorvastatin 80 milliGRAM(s) Oral at bedtime  buDESOnide   90 MICROgram(s) Inhaler 2 Puff(s) Inhalation two times a day  dextrose 5%. 1000 milliLiter(s) (50 mL/Hr) IV Continuous <Continuous>  dextrose 5%. 1000 milliLiter(s) (50 mL/Hr) IV Continuous <Continuous>  dextrose 50% Injectable 12.5 Gram(s) IV Push once  dextrose 50% Injectable 25 Gram(s) IV Push once  dextrose 50% Injectable 25 Gram(s) IV Push once  DOBUTamine Infusion 7.5 MICROgram(s)/kG/Min (16.942 mL/Hr) IV Continuous <Continuous>  docusate sodium 100 milliGRAM(s) Oral two times a day  finasteride 5 milliGRAM(s) Oral daily  influenza   Vaccine 0.5 milliLiter(s) IntraMuscular once  insulin glargine Injectable (LANTUS) 20 Unit(s) SubCutaneous at bedtime  insulin lispro (HumaLOG) corrective regimen sliding scale   SubCutaneous three times a day before meals  insulin lispro (HumaLOG) corrective regimen sliding scale   SubCutaneous at bedtime  levothyroxine 75 MICROGram(s) Oral daily  midodrine 30 milliGRAM(s) Oral three times a day  MIRACLE MOUTHWASH 30 milliLiter(s) Swish and Spit three times a day  Nephro-lynda 1 Tablet(s) Oral daily  polyethylene glycol 3350 17 Gram(s) Oral daily  Saliva Substitute (CAPHOSOL) 30 milliLiter(s) Swish and Spit every 6 hours  senna 2 Tablet(s) Oral at bedtime    MEDICATIONS  (PRN):  benzocaine 15 mG/menthol 3.6 mG Lozenge 1 Lozenge Oral every 6 hours PRN Sore Throat  dextrose Gel 1 Dose(s) Oral once PRN Blood Glucose LESS THAN 70 milliGRAM(s)/deciLiter  dextrose Gel 1 Dose(s) Oral once PRN Blood Glucose LESS THAN 70 milliGRAM(s)/deciliter  glucagon  Injectable 1 milliGRAM(s) IntraMuscular once PRN Glucose <70 milliGRAM(s)/deciLiter  guaiFENesin    Syrup 100 milliGRAM(s) Oral every 6 hours PRN Cough  guaiFENesin   Syrup  (Sugar-Free) 200 milliGRAM(s) Oral every 6 hours PRN Cough  sodium chloride 0.65% Nasal 1 Spray(s) Both Nostrils every 2 hours PRN Nasal Congestion      T(C): 36.7 (03-22-18 @ 10:25)  HR: 93 (03-22-18 @ 10:59)  BP: 123/63 (03-22-18 @ 10:25)  RR: 18 (03-22-18 @ 10:25)  SpO2: 97% (03-22-18 @ 10:59)  CAPILLARY BLOOD GLUCOSE      POCT Blood Glucose.: 126 mg/dL (22 Mar 2018 08:04)  POCT Blood Glucose.: 99 mg/dL (22 Mar 2018 05:27)  POCT Blood Glucose.: 199 mg/dL (21 Mar 2018 23:28)  POCT Blood Glucose.: 124 mg/dL (21 Mar 2018 22:18)  POCT Blood Glucose.: 123 mg/dL (21 Mar 2018 17:34)  POCT Blood Glucose.: 190 mg/dL (21 Mar 2018 14:39)  POCT Blood Glucose.: 247 mg/dL (21 Mar 2018 13:34)    I&O's Summary    21 Mar 2018 07:01  -  22 Mar 2018 07:00  --------------------------------------------------------  IN: 500 mL / OUT: 3500 mL / NET: -3000 mL    22 Mar 2018 07:01  -  22 Mar 2018 11:07  --------------------------------------------------------  IN: 400 mL / OUT: 3900 mL / NET: -3500 mL        PHYSICAL EXAM:  GENERAL: NAD, well-developed, AOx3  HEAD:  Atraumatic, Normocephalic  EYES: EOMI, PERRL, conjunctiva and sclera clear  NECK: Supple, No JVD  CHEST/LUNG: LB crackles   HEART: Regular rate and rhythm; No murmurs, rubs, or gallops, + 2 LE pitting Edema  ABDOMEN: Soft, Nontender, Nondistended; Bowel sounds present  EXTREMITIES: no cyanosis     PSYCH: No SI/HI  NEUROLOGY: non-focal  SKIN: No rashes or lesions    LABS:                        10.0   7.46  )-----------( 95       ( 22 Mar 2018 06:20 )             32.0     03-22    136  |  93<L>  |  52<H>  ----------------------------<  139<H>  3.6   |  29  |  3.87<H>    Ca    8.7      22 Mar 2018 06:20  Mg     2.1     03-22      PT/INR - ( 22 Mar 2018 06:20 )   PT: 25.4 SEC;   INR: 2.25          PTT - ( 21 Mar 2018 06:45 )  PTT:51.0 SEC              RADIOLOGY & ADDITIONAL TESTS:    Imaging Personally Reviewed:    Consultant(s) Notes Reviewed:      Care Discussed with Consultants/Other Providers: renal, Dr Ridley

## 2018-03-23 VITALS — OXYGEN SATURATION: 94 %

## 2018-03-23 LAB
BUN SERPL-MCNC: 54 MG/DL — HIGH (ref 7–23)
CALCIUM SERPL-MCNC: 8.7 MG/DL — SIGNIFICANT CHANGE UP (ref 8.4–10.5)
CHLORIDE SERPL-SCNC: 94 MMOL/L — LOW (ref 98–107)
CO2 SERPL-SCNC: 27 MMOL/L — SIGNIFICANT CHANGE UP (ref 22–31)
CREAT SERPL-MCNC: 3.67 MG/DL — HIGH (ref 0.5–1.3)
GLUCOSE BLDC GLUCOMTR-MCNC: 156 MG/DL — HIGH (ref 70–99)
GLUCOSE BLDC GLUCOMTR-MCNC: 246 MG/DL — HIGH (ref 70–99)
GLUCOSE BLDC GLUCOMTR-MCNC: 77 MG/DL — SIGNIFICANT CHANGE UP (ref 70–99)
GLUCOSE BLDC GLUCOMTR-MCNC: 80 MG/DL — SIGNIFICANT CHANGE UP (ref 70–99)
GLUCOSE SERPL-MCNC: 117 MG/DL — HIGH (ref 70–99)
HCT VFR BLD CALC: 34.1 % — LOW (ref 39–50)
HGB BLD-MCNC: 10.8 G/DL — LOW (ref 13–17)
INR BLD: 1.88 — HIGH (ref 0.88–1.17)
MAGNESIUM SERPL-MCNC: 2.3 MG/DL — SIGNIFICANT CHANGE UP (ref 1.6–2.6)
MCHC RBC-ENTMCNC: 31.7 % — LOW (ref 32–36)
MCHC RBC-ENTMCNC: 31.7 PG — SIGNIFICANT CHANGE UP (ref 27–34)
MCV RBC AUTO: 100 FL — SIGNIFICANT CHANGE UP (ref 80–100)
NRBC # FLD: 0 — SIGNIFICANT CHANGE UP
PLATELET # BLD AUTO: 104 K/UL — LOW (ref 150–400)
PMV BLD: 13.4 FL — HIGH (ref 7–13)
POTASSIUM SERPL-MCNC: 4 MMOL/L — SIGNIFICANT CHANGE UP (ref 3.5–5.3)
POTASSIUM SERPL-SCNC: 4 MMOL/L — SIGNIFICANT CHANGE UP (ref 3.5–5.3)
PROTHROM AB SERPL-ACNC: 21.9 SEC — HIGH (ref 9.8–13.1)
RBC # BLD: 3.41 M/UL — LOW (ref 4.2–5.8)
RBC # FLD: 17.8 % — HIGH (ref 10.3–14.5)
SODIUM SERPL-SCNC: 136 MMOL/L — SIGNIFICANT CHANGE UP (ref 135–145)
WBC # BLD: 15.07 K/UL — HIGH (ref 3.8–10.5)
WBC # FLD AUTO: 15.07 K/UL — HIGH (ref 3.8–10.5)

## 2018-03-23 PROCEDURE — 99239 HOSP IP/OBS DSCHRG MGMT >30: CPT

## 2018-03-23 PROCEDURE — 99497 ADVNCD CARE PLAN 30 MIN: CPT

## 2018-03-23 RX ORDER — SODIUM CHLORIDE 0.65 %
1 AEROSOL, SPRAY (ML) NASAL
Qty: 4 | Refills: 0 | OUTPATIENT
Start: 2018-03-23 | End: 2018-04-21

## 2018-03-23 RX ORDER — WARFARIN SODIUM 2.5 MG/1
3 TABLET ORAL ONCE
Qty: 0 | Refills: 0 | Status: COMPLETED | OUTPATIENT
Start: 2018-03-23 | End: 2018-03-23

## 2018-03-23 RX ADMIN — Medication 30 MILLILITER(S): at 13:19

## 2018-03-23 RX ADMIN — WARFARIN SODIUM 3 MILLIGRAM(S): 2.5 TABLET ORAL at 17:16

## 2018-03-23 RX ADMIN — MIDODRINE HYDROCHLORIDE 30 MILLIGRAM(S): 2.5 TABLET ORAL at 13:19

## 2018-03-23 RX ADMIN — POLYETHYLENE GLYCOL 3350 17 GRAM(S): 17 POWDER, FOR SOLUTION ORAL at 13:19

## 2018-03-23 RX ADMIN — MIDODRINE HYDROCHLORIDE 30 MILLIGRAM(S): 2.5 TABLET ORAL at 05:20

## 2018-03-23 RX ADMIN — Medication 16.94 MICROGRAM(S)/KG/MIN: at 07:16

## 2018-03-23 RX ADMIN — Medication 1 TABLET(S): at 13:18

## 2018-03-23 RX ADMIN — Medication 81 MILLIGRAM(S): at 13:18

## 2018-03-23 RX ADMIN — Medication 100 MILLIGRAM(S): at 05:21

## 2018-03-23 RX ADMIN — DIPHENHYDRAMINE HYDROCHLORIDE AND LIDOCAINE HYDROCHLORIDE AND ALUMINUM HYDROXIDE AND MAGNESIUM HYDRO 30 MILLILITER(S): KIT at 05:22

## 2018-03-23 RX ADMIN — Medication 16.94 MICROGRAM(S)/KG/MIN: at 06:13

## 2018-03-23 RX ADMIN — Medication 2: at 13:19

## 2018-03-23 RX ADMIN — FINASTERIDE 5 MILLIGRAM(S): 5 TABLET, FILM COATED ORAL at 13:19

## 2018-03-23 RX ADMIN — Medication 3 MILLILITER(S): at 16:02

## 2018-03-23 RX ADMIN — AMIODARONE HYDROCHLORIDE 100 MILLIGRAM(S): 400 TABLET ORAL at 05:20

## 2018-03-23 RX ADMIN — Medication 75 MICROGRAM(S): at 05:20

## 2018-03-23 RX ADMIN — Medication 2 PUFF(S): at 10:40

## 2018-03-23 RX ADMIN — Medication 3 MILLILITER(S): at 03:37

## 2018-03-23 RX ADMIN — Medication 30 MILLILITER(S): at 05:21

## 2018-03-23 RX ADMIN — DIPHENHYDRAMINE HYDROCHLORIDE AND LIDOCAINE HYDROCHLORIDE AND ALUMINUM HYDROXIDE AND MAGNESIUM HYDRO 30 MILLILITER(S): KIT at 13:20

## 2018-03-23 NOTE — PROGRESS NOTE ADULT - PROBLEM SELECTOR PROBLEM 1
Acute on chronic respiratory failure with hypoxia
ESRD (end stage renal disease) on dialysis
Respiratory failure
Acute on chronic respiratory failure with hypoxia
Chronic heart failure, unspecified heart failure type
ESRD (end stage renal disease) on dialysis
Hemoptysis
Hemoptysis
Hypotension
Influenza B
Influenza B
Respiratory failure
Acute on chronic respiratory failure with hypoxia
Hypotension
Hypotension
Respiratory failure
Acute on chronic respiratory failure with hypoxia
ESRD (end stage renal disease) on dialysis
Respiratory failure
ESRD (end stage renal disease) on dialysis
Respiratory failure
Acute on chronic respiratory failure with hypoxia
Influenza B
Acute on chronic respiratory failure with hypoxia
Respiratory failure
Influenza B
Acute on chronic respiratory failure with hypoxia
Acute on chronic respiratory failure with hypoxia

## 2018-03-23 NOTE — PROGRESS NOTE ADULT - PROBLEM SELECTOR PLAN 5
hd as per renal  keep net negative as able  3/1: on HD  3/17 HD  3/4: on HD  3/9: cont HD per renal  3/11: on HD  3/12: on HD  3/14: Cont HD  3/15: On HD  3/19: Cont HD  3/21: on HD  3/23: On Hemodialysis

## 2018-03-23 NOTE — PROGRESS NOTE ADULT - SUBJECTIVE AND OBJECTIVE BOX
Patient is a 62y old  Male who presents with a chief complaint of SOB x few hours (20 Feb 2018 18:05)      SUBJECTIVE / OVERNIGHT EVENTS: s/p HD, feels at baseline breathing, no further hemoptysis     ROS:  No HA/DZ  No Vision changes   No N/V/D  No Rash  NO weakness, numbness    MEDICATIONS  (STANDING):  ALBUTerol/ipratropium for Nebulization 3 milliLiter(s) Nebulizer every 6 hours  amiodarone    Tablet 100 milliGRAM(s) Oral daily  aspirin enteric coated 81 milliGRAM(s) Oral daily  atorvastatin 80 milliGRAM(s) Oral at bedtime  buDESOnide   90 MICROgram(s) Inhaler 2 Puff(s) Inhalation two times a day  dextrose 5%. 1000 milliLiter(s) (50 mL/Hr) IV Continuous <Continuous>  dextrose 5%. 1000 milliLiter(s) (50 mL/Hr) IV Continuous <Continuous>  dextrose 50% Injectable 12.5 Gram(s) IV Push once  dextrose 50% Injectable 25 Gram(s) IV Push once  dextrose 50% Injectable 25 Gram(s) IV Push once  DOBUTamine Infusion 7.5 MICROgram(s)/kG/Min (16.942 mL/Hr) IV Continuous <Continuous>  docusate sodium 100 milliGRAM(s) Oral two times a day  finasteride 5 milliGRAM(s) Oral daily  influenza   Vaccine 0.5 milliLiter(s) IntraMuscular once  insulin glargine Injectable (LANTUS) 20 Unit(s) SubCutaneous at bedtime  insulin lispro (HumaLOG) corrective regimen sliding scale   SubCutaneous three times a day before meals  insulin lispro (HumaLOG) corrective regimen sliding scale   SubCutaneous at bedtime  levothyroxine 75 MICROGram(s) Oral daily  midodrine 30 milliGRAM(s) Oral three times a day  MIRACLE MOUTHWASH 30 milliLiter(s) Swish and Spit three times a day  Nephro-lynda 1 Tablet(s) Oral daily  polyethylene glycol 3350 17 Gram(s) Oral daily  Saliva Substitute (CAPHOSOL) 30 milliLiter(s) Swish and Spit every 6 hours  senna 2 Tablet(s) Oral at bedtime  warfarin 3 milliGRAM(s) Oral once    MEDICATIONS  (PRN):  benzocaine 15 mG/menthol 3.6 mG Lozenge 1 Lozenge Oral every 6 hours PRN Sore Throat  dextrose Gel 1 Dose(s) Oral once PRN Blood Glucose LESS THAN 70 milliGRAM(s)/deciLiter  dextrose Gel 1 Dose(s) Oral once PRN Blood Glucose LESS THAN 70 milliGRAM(s)/deciliter  glucagon  Injectable 1 milliGRAM(s) IntraMuscular once PRN Glucose <70 milliGRAM(s)/deciLiter  guaiFENesin   Syrup  (Sugar-Free) 200 milliGRAM(s) Oral every 6 hours PRN Cough  sodium chloride 0.65% Nasal 1 Spray(s) Both Nostrils every 2 hours PRN Nasal Congestion      T(C): 36.4 (03-23-18 @ 10:35)  HR: 80 (03-23-18 @ 11:08)  BP: 97/47 (03-23-18 @ 10:35)  RR: 18 (03-23-18 @ 10:35)  SpO2: 93% (03-23-18 @ 11:08)  CAPILLARY BLOOD GLUCOSE      POCT Blood Glucose.: 80 mg/dL (23 Mar 2018 08:44)  POCT Blood Glucose.: 156 mg/dL (23 Mar 2018 04:30)  POCT Blood Glucose.: 77 mg/dL (23 Mar 2018 03:43)  POCT Blood Glucose.: 193 mg/dL (22 Mar 2018 22:54)  POCT Blood Glucose.: 187 mg/dL (22 Mar 2018 17:44)    I&O's Summary    22 Mar 2018 07:01  -  23 Mar 2018 07:00  --------------------------------------------------------  IN: 400 mL / OUT: 3900 mL / NET: -3500 mL    23 Mar 2018 07:01  -  23 Mar 2018 13:16  --------------------------------------------------------  IN: 400 mL / OUT: 3400 mL / NET: -3000 mL        PHYSICAL EXAM:  GENERAL: NAD, well-developed, AOx3  HEAD:  Atraumatic, Normocephalic  EYES: EOMI, PERRL, conjunctiva and sclera clear  NECK: Supple, No JVD  CHEST/LUNG: LB crackes  HEART: Regular rate and rhythm; No murmurs, rubs, or gallops, + 2 pitting Edema  ABDOMEN: Soft, Nontender, Nondistended; Bowel sounds present  EXTREMITIES:  2+ Peripheral Pulses, No clubbing, cyanosis  PSYCH: No SI/HI  NEUROLOGY: non-focal  SKIN: No rashes or lesions    LABS:                        10.8   15.07 )-----------( 104      ( 23 Mar 2018 06:35 )             34.1     03-23    136  |  94<L>  |  54<H>  ----------------------------<  117<H>  4.0   |  27  |  3.67<H>    Ca    8.7      23 Mar 2018 06:35  Mg     2.3     03-23      PT/INR - ( 23 Mar 2018 06:35 )   PT: 21.9 SEC;   INR: 1.88                        RADIOLOGY & ADDITIONAL TESTS:    Imaging Personally Reviewed:    Consultant(s) Notes Reviewed:      Care Discussed with Consultants/Other Providers:

## 2018-03-23 NOTE — PROGRESS NOTE ADULT - PROBLEM SELECTOR PLAN 2
with recurrent SOB again today, undergoing extra HD session this morning. will reassess after HD today. no clear endpoint as he tends to decompensated quite rapidly and wants all aggressive measures. no other means of volume management except HD which is also at times limited in volume removal due to chronic hypotension. will attempt to DC today

## 2018-03-23 NOTE — PROGRESS NOTE ADULT - PROBLEM SELECTOR PLAN 2
3/4: given risk of increasing retention of salt and water , and he has been on dobutamine: would cut it down to 10 mg tomorrow and slowly taper it to off  3/5: It is very hard to determine whether steroids are beneficially improving his SOB: I doubt that steroids are of much help here: They may also potentiate salt and water retention! Will taper them off!  3/6: today sister says she would like to continue steroids for sometime : would continue for now and taper slowly: May be send home on 10 mg of steroids:  3/7: doing ok : cont current care: Has CMP and is on Dobutamine Drip  3/8: on dobutamine  3/9: cont dobutamine and night time BiPAP  3/10: cont current treatment : breathing wise he remains tenuous:  3/11: on dobutamine:  3/12: o n dobutamine:  3/13: to cont dobutamine: Pt is on night time bipap: But he has not used it in last few days but would like to have one at home upon dc: May arrange for it : Pt encouraged to use the bipap daily:  3/14: doing ok : cont current treatment: finish steroids on 16th  3/15: doing same: SOB off and on :  3/19: pt uses bipap prn at night: yesterday she did not use it  3/17 stable.  3/18 stable. on room air. on Dobutamine gtt  3/19: cont current care: overall prognosis remains poor: he is getting maximal therapy for CHF as well as supportive treatment for pulmonary fibrosis:  3/20: doingok : would need bipap at home  3/21: stable: cont current care:  3/22:contbipap at night  3/23: cont oxygen as well as dobutamine

## 2018-03-23 NOTE — PROGRESS NOTE ADULT - I WAS PHYSICALLY PRESENT FOR THE KEY PORTIONS OF THE EVALUATION AND MANAGEMENT (E/M) SERVICE PROVIDED.  I AGREE WITH THE ABOVE HISTORY, PHYSICAL, AND PLAN WHICH I HAVE REVIEWED AND EDITED WHERE APPROPRIATE

## 2018-03-23 NOTE — PROGRESS NOTE ADULT - PROBLEM SELECTOR PLAN 8
- I had further discussion with pt today, face to face for 30 min. Pt wishes to continue with all aggressive measures; states that even if he's dependent on machines, he wants to do everything to stay alive.  - Very poor prognosis, high chance of decompensation after discharge.  Family fully aware of pt's decisions. DC planning for today post HD. dw SW and CM  - dw renal, can accommodate 4 x HD weekly, renal ok with discharge. DC planning for today. Time spent 45 min

## 2018-03-23 NOTE — PROGRESS NOTE ADULT - PROVIDER SPECIALTY LIST ADULT
CCU
Cardiology
Hospitalist
Internal Medicine
Internal Medicine
Nephrology
Palliative Care
Pulmonology
Cardiology
Cardiology
Hospitalist
Nephrology
Pulmonology
Pulmonology
Nephrology
Pulmonology
Nephrology
Pulmonology
Pulmonology
Hospitalist
Internal Medicine
Hospitalist
Internal Medicine
Pulmonology
Hospitalist
Pulmonology
Pulmonology

## 2018-03-23 NOTE — PROGRESS NOTE ADULT - PROBLEM SELECTOR PLAN 3
Rah Shelton is a 26 y.o. female who presents for coughing and bodyaches and nasal congestion. Started yesterday. She has tried theraflu. She also reports pain in middle of her back, states she had similar pain before when she had shingles. Denies any vesicles. Pain does not wrap around her side. Meds:   Current Outpatient Prescriptions   Medication Sig Dispense Refill    JUNEL FE 1.5/30, 28, 1.5 mg-30 mcg (21)/75 mg (7) tab TAKE 1 TABLET EVERY DAY  3       Allergies:   No Known Allergies    Smoker:  History   Smoking Status    Never Smoker   Smokeless Tobacco    Never Used       ETOH:   History   Alcohol Use No       FH:   Family History   Problem Relation Age of Onset    Hypertension Mother     Diabetes Maternal Grandmother        ROS:  General/Constitutional:   No headache, fever, fatigue, weight loss or weight gain       Eyes:   No redness, pruritis, pain, visual changes, swelling, or discharge      Ears:    No pain, loss or changes in hearing     Nose: Nasal congestion and rhinorrea  Neck:   No swelling, masses, stiffness, pain, or limited movement     Cardiac:    No chest pain      Respiratory:  cough   GI:   No nausea/vomiting, diarrhea, abdominal pain, bloody or dark stools       Skin: No rash     Physical Exam:  Visit Vitals    /87 (BP 1 Location: Right arm, BP Patient Position: Sitting)    Pulse 78    Temp 98.4 °F (36.9 °C) (Oral)    Resp 16    Ht 5' 4\" (1.626 m)    Wt 147 lb 3.2 oz (66.8 kg)    LMP 01/03/2017    SpO2 98%    BMI 25.27 kg/m2     General: Alert and oriented, in no acute distress. Responds to all questions appropriately. SKIN: No rash. Normal color. HEAD: No sinus tenderness. EYES: Conjunctiva are clear; pupils round and reactive to light. EARS: External normal, canals clear, tympanic membranes normal.  NOSE: Edema, erythema, clear mucous drainage. OROPHARYNX: Slight tonsil edema, erythema, no exudate.   NECK: Supple; no masses; normal lymphadenopathy. LUNGS: Respirations unlabored; clear to auscultation bilaterally, no wheeze, rales or rhonchi. CARDIOVASCULAR: Regular, rate, and rhythm without murmurs, gallops or rubs. EXTREMITIES: No edema, cyanosis or clubbing. BACK: point ttp over mid right paraspinal muscle, no rash noted  NEUROLOGIC: Speech intact; face symmetrical; moves all extremities equally      Assessment:    ICD-10-CM ICD-9-CM    1. Body aches R52 780.96 JUNEL FE 1.5/30, 28, 1.5 mg-30 mcg (21)/75 mg (7) tab      AMB POC RAPID INFLUENZA TEST   2. Muscle spasm M62.838 728.85 cyclobenzaprine (FLEXERIL) 5 mg tablet   3. Viral URI J06.9 465.9     B97.89       Rapid flu negative. Manage sxs as below. Back pain likely msk, no evidence of rash or dermatomal involvement. Massage, warm packs, advil, flexeril prn. Plan:  Discharge instructions:  1. Combination cough and cold medicine such as Mucinex DM  2. Salt water gargle. 3. Plenty of fluids. 4. Ibuprofen (Motrin, Advil):  200mg - take 1-4 tables three times as needed for pain and fever   5. Acetaminophen (Tylenol):  500mg 1-2 tablets every 6 hours as needed for pain and fever. 6. Throat lozenges such as Halls as needed. 7. Humidifier as needed. Follow Up:  Get re-examined if not improved in  5-7 days or if symptoms worsen.      If you get suddenly worse, go to the nearest hospital Emergency Room 3/5: taper off prednisone: already ordered  3*6: cont steroids  3/7: doing pretty poor: cont steroids trial for some time  3/8: on steroids at tis time: low dose steroids  3/9: It is difficult to discern if steroids are helpful in him: However the pt as well as the sister insists that we cont to try steroids: They think he feels a little better with it: Side effects have been explained to them:  3/10: cont 10 mg today too ? decrease to 5 mg soon  3/11: on steroids:  3/12: pt is on 5 mg of steroids: will taper  to off on next few days:  3/13: pt has been doing poorly: cont prednisone till 16 of March.  3/14: don't think steroids are helping much : will finish soon:  3/16: no significant benefit from steroids: DC steroids:   3/15:has extensive fibrosis: taper steroids to off tomorrow:  3/16 off steroid  3/18 monitor.  3/19: Off steroids  3/20: I don't see any need for steorids::  3/21: stable: cont conservative management  3/22:stable :cont current conservative management  3/23: cont current care:

## 2018-03-23 NOTE — PROGRESS NOTE ADULT - PROBLEM SELECTOR PLAN 6
HR controlled  ac as per cards/medicine  management as per cards  3/1: INR is therapeutic:  3/3: INR is therapeutic  3/5: INR therapeutic::  3/9: INR is therapeutic:  3/11: INR is low:  3/12: 1.70  3/13: HR is controlled:: On AC per primary/Cardiology  3/14: HR seems to be controlled: on AC  3/15: Cont AC  3/16: Stable: on AC  3/17 on AC  3/19: HR controlled: on AC  3/20: HR seems to be controlled  3/23: HR controlled

## 2018-03-23 NOTE — PROGRESS NOTE ADULT - PROBLEM SELECTOR PLAN 6
- Hypercoagulable state 2/2 chronic Afib   - HR at goal. Not a candidate for beta blockers given chronic hypotension.  - On warfarin.  INR now subtherapeutic, no need for bridge, cw Coumadin   - c/w amiodarone; as per discussion with Dr. Davis, would continue with Amiodarone despite pulmonary fibrosis.

## 2018-03-23 NOTE — PROGRESS NOTE ADULT - ATTENDING COMMENTS
pt is being dialyzed to maximum. Pt is on supranormal midodrine doses. Is it possible there is a right-to-left atrial shunt? Would a repeat bubble study be indicated? If not, I would consider palliative input for GOC.   I have added prednisone to attempt to assist with the respiratory aspect of his hypoxemia without any clear indication.   I have discussed with him basic GOC. He states that he would want "everything done" in case of cardiac arrest.   His HCP are wife and sister. I have discussed with him the low probability of success of CPR in his case.   2/28: cont current treatment  3/1: for now cont steroids as well as bipap at night as well as oxygen to keep sao2 above 90%  3/2: Overall prognosis is pretty guarded!!  3/3: doing ok : decrease steroids to 15 mg a day  3/4: decrease prednisone to 10 mg tomorrow  3/5: taper prednisone to off soon  3/6: pt sister sdays he is getting better with steroids: It is hard to  in this pt whether steroids are really contributing to his improvements in breathing: Since the pts sister insists: would cont for sometime,  3/8: Discussed with the pts sister: it is very hard to detect the efficacy of steroids in this debilitated patient: He has pretty bad EF and has been dependent on dobutamine: and also is  receiving prednisone: for n ow, since the pts says he feels better with steroids, would continue it for sometime, but goal should be to taper it down in next few weeks to off:  3/9: Cont current therpay  3/10: stable: but respiratory : tenuous state
pt is being dialyzed to maximum. Pt is on supranormal midodrine doses. Is it possible there is a right-to-left atrial shunt? Would a repeat bubble study be indicated? If not, I would consider palliative input for GOC.   I have added prednisone to attempt to assist with the respiratory aspect of his hypoxemia without any clear indication.   I have discussed with him basic GOC. He states that he would want "everything done" in case of cardiac arrest.   His HCP are wife and sister. I have discussed with him the low probability of success of CPR in his case.   2/28: cont current treatment  3/1: for now cont steroids as well as bipap at night as well as oxygen to keep sao2 above 90%  3/2: Overall prognosis is pretty guarded!!  3/3: doing ok : decrease steroids to 15 mg a day  3/4: decrease prednisone to 10 mg tomorrow  3/5: taper prednisone to off soon  3/6: pt sister sdays he is getting better with steroids: It is hard to  in this pt whether steroids are really contributing to his improvements in breathing: Since the pts sister insists: would cont for sometime,  3/8: Discussed with the pts sister: it is very hard to detect the efficacy of steroids in this debilitated patient: He has pretty bad EF and has been dependent on dobutamine: and also is  receiving prednisone: for n ow, since the pts says he feels better with steroids, would continue it for sometime, but goal should be to taper it down in next few weeks to off:  3/9: Cont current therapy  3/10: stable: but respiratory : tenuous state  3/11: doing ok : no wheezing today : :
pt is being dialyzed to maximum. Pt is on supranormal midodrine doses. Is it possible there is a right-to-left atrial shunt? Would a repeat bubble study be indicated? If not, I would consider palliative input for GOC.   I have added prednisone to attempt to assist with the respiratory aspect of his hypoxemia without any clear indication.   I have discussed with him basic GOC. He states that he would want "everything done" in case of cardiac arrest.   His HCP are wife and sister. I have discussed with him the low probability of success of CPR in his case.   2/28: cont current treatment  3/1: for now cont steroids as well as bipap at night as well as oxygen to keep sao2 above 90%  3/2: Overall prognosis is pretty guarded!!  3/3: doing ok : decrease steroids to 15 mg a day  3/4: decrease prednisone to 10 mg tomorrow  3/5: taper prednisone to off soon  3/6: pt sister sdays he is getting better with steroids: It is hard to  in this pt whether steroids are really contributing to his improvements in breathing: Since the pts sister insists: would cont for sometime,
pt is being dialyzed to maximum. Pt is on supranormal midodrine doses. Is it possible there is a right-to-left atrial shunt? Would a repeat bubble study be indicated? If not, I would consider palliative input for GOC.   I have added prednisone to attempt to assist with the respiratory aspect of his hypoxemia without any clear indication.   I have discussed with him basic GOC. He states that he would want "everything done" in case of cardiac arrest.   His HCP are wife and sister. I have discussed with him the low probability of success of CPR in his case.   2/28: cont current treatment  3/1: for now cont steroids as well as bipap at night as well as oxygen to keep sao2 above 90%  3/2: Overall prognosis is pretty guarded!!  3/3: doing ok : decrease steroids to 15 mg a day  3/4: decrease prednisone to 10 mg tomorrow  3/5: taper prednisone to off soon  3/6: pt sister sdays he is getting better with steroids: It is hard to  in this pt whether steroids are really contributing to his improvements in breathing: Since the pts sister insists: would cont for sometime,  3/8: Discussed with the pts sister: it is very hard to detect the efficacy of steroids in this debilitated patient: He has pretty bad EF and has been dependent on dobutamine: and also is  receiving prednisone: for n ow, since the pts says he feels better with steroids, would continue it for sometime, but goal should be to taper it down in next few weeks to off:  3/9: Cont current therapy  3/10: stable: but respiratory : tenuous state  3/11: doing ok : no wheezing today : :  3/12: doing pretty good: taper off steroids  3/13: doing pretty poor:
3/19: Pt remains critical with SOB off and on despite getting maximum therapy: Off steorids now!
pt is being dialyzed to maximum. Pt is on supranormal midodrine doses. Is it possible there is a right-to-left atrial shunt? Would a repeat bubble study be indicated? If not, I would consider palliative input for GOC.   I have added prednisone to attempt to assist with the respiratory aspect of his hypoxemia without any clear indication.   I have discussed with him basic GOC. He states that he would want "everything done" in case of cardiac arrest.   His HCP are wife and sister. I have discussed with him the low probability of success of CPR in his case.   2/28: cont current treatment  3/1: for now cont steroids as well as bipap at night as well as oxygen to keep sao2 above 90%
pt is being dialyzed to maximum. Pt is on supranormal midodrine doses. Is it possible there is a right-to-left atrial shunt? Would a repeat bubble study be indicated? If not, I would consider palliative input for GOC.   I have added prednisone to attempt to assist with the respiratory aspect of his hypoxemia without any clear indication.   I have discussed with him basic GOC. He states that he would want "everything done" in case of cardiac arrest.   His HCP are wife and sister. I have discussed with him the low probability of success of CPR in his case.   2/28: cont current treatment  3/1: for now cont steroids as well as bipap at night as well as oxygen to keep sao2 above 90%  3/2: Overall prognosis is pretty guarded!!  3/3: doing ok : decrease steroids to 15 mg a day  3/4: decrease prednisone to 10 mg tomorrow  3/5: taper prednisone to off soon  3/6: pt sister sdays he is getting better with steroids: It is hard to  in this pt whether steroids are really contributing to his improvements in breathing: Since the pts sister insists: would cont for sometime,  3/8: Discussed with the pts sister: it is very hard to detect the efficacy of steroids in this debilitated patient: He has pretty bad EF and has been dependent on dobutamine: and also is  receiving prednisone: for n ow, since the pts says he feels better with steroids, would continue it for sometime, but goal should be to taper it down in next few weeks to off:  3/9: Cont current therapy  3/10: stable: but respiratory : tenuous state  3/11: doing ok : no wheezing today : :  3/12: doing pretty good: taper off steroids  3/13: doing pretty poor:  3/14: Cont to taper steroids to off: Will finish on 16th  3/15: cont current care: dc steroids tomorrow:  3/16: DC steroids today
3/19: Pt remains critical with SOB off and on despite getting maximum therapy: Off steroids now!  3/20: stable: but chronically sick: and has a pretty poor prognosis::  3/21: Pt seems to be stable: remains chronically sick:
3/17: pt is still SOB, prednisone deced: To cont dobutamine: on HD
3/19: Pt remains critical with SOB off and on despite getting maximum therapy: Off steroids now!  3/20: stable: but chronically sick: and has a pretty poor prognosis::
pt is being dialyzed to maximum. Pt is on supranormal midodrine doses. Is it possible there is a right-to-left atrial shunt? Would a repeat bubble study be indicated? If not, I would consider palliative input for GOC.   I have added prednisone to attempt to assist with the respiratory aspect of his hypoxemia without any clear indication.   I have discussed with him basic GOC. He states that he would want "everything done" in case of cardiac arrest.   His HCP are wife and sister. I have discussed with him the low probability of success of CPR in his case.   2/28: cont current treatment  3/1: for now cont steroids as well as bipap at night as well as oxygen to keep sao2 above 90%  3/2: Overall prognosis is pretty guarded!!  3/3: doing ok : decrease steroids to 15 mg a day  3/4: decrease prednisone to 10 mg tomorrow  3/5: taper prednisone to off soon  3/6: pt sister sdays he is getting better with steroids: It is hard to  in this pt whether steroids are really contributing to his improvements in breathing: Since the pts sister insists: would cont for sometime,
pt is being dialyzed to maximum. Pt is on supranormal midodrine doses. Is it possible there is a right-to-left atrial shunt? Would a repeat bubble study be indicated? If not, I would consider palliative input for GOC.   I have added prednisone to attempt to assist with the respiratory aspect of his hypoxemia without any clear indication.   I have discussed with him basic GOC. He states that he would want "everything done" in case of cardiac arrest.   His HCP are wife and sister. I have discussed with him the low probability of success of CPR in his case.   2/28: cont current treatment  3/1: for now cont steroids as well as bipap at night as well as oxygen to keep sao2 above 90%  3/2: Overall prognosis is pretty guarded!!  3/3: doing ok : decrease steroids to 15 mg a day  3/4: decrease prednisone to 10 mg tomorrow  3/5: taper prednisone to off soon  3/6: pt sister sdays he is getting better with steroids: It is hard to  in this pt whether steroids are really contributing to his improvements in breathing: Since the pts sister insists: would cont for sometime,  3/8: Discussed with the pts sister: it is very hard to detect the efficacy of steroids in this debilitated patient: He has pretty bad EF and has been dependent on dobutamine: and also is  receiving prednisone: for n ow, since the pts says he feels better with steroids, would continue it for sometime, but goal should be to taper it down in next few weeks to off:
pt is being dialyzed to maximum. Pt is on supranormal midodrine doses. Is it possible there is a right-to-left atrial shunt? Would a repeat bubble study be indicated? If not, I would consider palliative input for GOC.   I have added prednisone to attempt to assist with the respiratory aspect of his hypoxemia without any clear indication.   I have discussed with him basic GOC. He states that he would want "everything done" in case of cardiac arrest.   His HCP are wife and sister. I have discussed with him the low probability of success of CPR in his case.   2/28: cont current treatment  3/1: for now cont steroids as well as bipap at night as well as oxygen to keep sao2 above 90%  3/2: Overall prognosis is pretty guarded!!  3/3: doing ok : decrease steroids to 15 mg a day  3/4: decrease prednisone to 10 mg tomorrow  3/5: taper prednisone to off soon  3/6: pt sister sdays he is getting better with steroids: It is hard to  in this pt whether steroids are really contributing to his improvements in breathing: Since the pts sister insists: would cont for sometime,  3/8: Discussed with the pts sister: it is very hard to detect the efficacy of steroids in this debilitated patient: He has pretty bad EF and has been dependent on dobutamine: and also is  receiving prednisone: for n ow, since the pts says he feels better with steroids, would continue it for sometime, but goal should be to taper it down in next few weeks to off:  3/9: Cont current therapy  3/10: stable: but respiratory : tenuous state  3/11: doing ok : no wheezing today : :  3/12: doing pretty good: taper off steroids
pt is being dialyzed to maximum. Pt is on supranormal midodrine doses. Is it possible there is a right-to-left atrial shunt? Would a repeat bubble study be indicated? If not, I would consider palliative input for GOC.   I have added prednisone to attempt to assist with the respiratory aspect of his hypoxemia without any clear indication.   I have discussed with him basic GOC. He states that he would want "everything done" in case of cardiac arrest.   His HCP are wife and sister. I have discussed with him the low probability of success of CPR in his case.   2/28: cont current treatment  3/1: for now cont steroids as well as bipap at night as well as oxygen to keep sao2 above 90%  3/2: Overall prognosis is pretty guarded!!  3/3: doing ok : decrease steroids to 15 mg a day  3/4: decrease prednisone to 10 mg tomorrow  3/5: taper prednisone to off soon  3/6: pt sister sdays he is getting better with steroids: It is hard to  in this pt whether steroids are really contributing to his improvements in breathing: Since the pts sister insists: would cont for sometime,  3/8: Discussed with the pts sister: it is very hard to detect the efficacy of steroids in this debilitated patient: He has pretty bad EF and has been dependent on dobutamine: and also is  receiving prednisone: for n ow, since the pts says he feels better with steroids, would continue it for sometime, but goal should be to taper it down in next few weeks to off:  3/9: Cont current therapy  3/10: stable: but respiratory : tenuous state  3/11: doing ok : no wheezing today : :  3/12: doing pretty good: taper off steroids  3/13: doing pretty poor:  3/14: Cont to taper steroids to off: Will finish on 16th
pt is being dialyzed to maximum. Pt is on supranormal midodrine doses. Is it possible there is a right-to-left atrial shunt? Would a repeat bubble study be indicated? If not, I would consider palliative input for GOC.   I have added prednisone to attempt to assist with the respiratory aspect of his hypoxemia without any clear indication.   I have discussed with him basic GOC. He states that he would want "everything done" in case of cardiac arrest.   His HCP are wife and sister. I have discussed with him the low probability of success of CPR in his case.   2/28: cont current treatment  3/1: for now cont steroids as well as bipap at night as well as oxygen to keep sao2 above 90%  3/2: Overall prognosis is pretty guarded!!  3/3: doing ok : decrease steroids to 15 mg a day  3/4: decrease prednisone to 10 mg tomorrow  3/5: taper prednisone to off soon  3/6: pt sister sdays he is getting better with steroids: It is hard to  in this pt whether steroids are really contributing to his improvements in breathing: Since the pts sister insists: would cont for sometime,  3/8: Discussed with the pts sister: it is very hard to detect the efficacy of steroids in this debilitated patient: He has pretty bad EF and has been dependent on dobutamine: and also is  receiving prednisone: for n ow, since the pts says he feels better with steroids, would continue it for sometime, but goal should be to taper it down in next few weeks to off:  3/9: Cont current therapy  3/10: stable: but respiratory : tenuous state  3/11: doing ok : no wheezing today : :  3/12: doing pretty good: taper off steroids  3/13: doing pretty poor:  3/14: Cont to taper steroids to off: Will finish on 16th  3/15: cont current care: dc steroids tomorrow:
pt is being dialyzed to maximum. Pt is on supranormal midodrine doses. Is it possible there is a right-to-left atrial shunt? Would a repeat bubble study be indicated? If not, I would consider palliative input for GOC.   I have added prednisone to attempt to assist with the respiratory aspect of his hypoxemia without any clear indication.   I have discussed with him basic GOC. He states that he would want "everything done" in case of cardiac arrest.   His HCP are wife and sister. I have discussed with him the low probability of success of CPR in his case.   2/28: cont current treatment  3/1: for now cont steroids as well as bipap at night as well as oxygen to keep sao2 above 90%  3/2: Overall prognosis is pretty guarded!!  3/3: doing ok : decrease steroids to 15 mg a day  3/4: decrease prednisone to 10 mg tomorrow  3/5: taper prednisone to off soon  3/6: pt sister sdays he is getting better with steroids: It is hard to  in this pt whether steroids are really contributing to his improvements in breathing: Since the pts sister insists: would cont for sometime,  3/8: Discussed with the pts sister: it is very hard to detect the efficacy of steroids in this debilitated patient: He has pretty bad EF and has been dependent on dobutamine: and also is  receiving prednisone: for n ow, since the pts says he feels better with steroids, would continue it for sometime, but goal should be to taper it down in next few weeks to off:  3/9: Cont current therpay
3/19: Pt remains critical with SOB off and on despite getting maximum therapy: Off steroids now!  3/20: stable: but chronically sick: and has a pretty poor prognosis::  3/21: Pt seems to be stable: remains chronically sick:  3/22: tome pt seems stable: I have asked him to cough out and collect in a cup for me to see in AM : to quantitate sputum and hemoptysis , if there is any !
3/19: Pt remains critical with SOB off and on despite getting maximum therapy: Off steroids now!  3/20: stable: but chronically sick: and has a pretty poor prognosis::  3/21: Pt seems to be stable: remains chronically sick:  3/22: tome pt seems stable: I have asked him to cough out and collect in a cup for me to see in AM : to quantitate sputum and hemoptysis , if there is any !  3/23: doing poorly: overall pretty poor prognosis : cont dobutamine:
pt is being dialyzed to maximum. Pt is on supranormal midodrine doses. Is it possible there is a right-to-left atrial shunt? Would a repeat bubble study be indicated? If not, I would consider palliative input for GOC.   I have added prednisone to attempt to assist with the respiratory aspect of his hypoxemia without any clear indication.   I have discussed with him basic GOC. He states that he would want "everything done" in case of cardiac arrest.   His HCP are wife and sister. I have discussed with him the low probability of success of CPR in his case.   2/28: cont current treatment  3/1: for now cont steroids as well as bipap at night as well as oxygen to keep sao2 above 90%  3/2: Overall prognosis is pretty guarded!!  3/3: doing ok : decrease steroids to 15 mg a day  3/4: decrease prednisone to 10 mg tomorrow  3/5: taper prednisone to off soon
pt is being dialyzed to maximum. Pt is on supranormal midodrine doses. Is it possible there is a right-to-left atrial shunt? Would a repeat bubble study be indicated? If not, I would consider palliative input for GOC.   I have added prednisone to attempt to assist with the respiratory aspect of his hypoxemia without any clear indication.   I have discussed with him basic GOC. He states that he would want "everything done" in case of cardiac arrest.   His HCP are wife and sister. I have discussed with him the low probability of success of CPR in his case.   2/28: cont current treatment  3/1: for now cont steroids as well as bipap at night as well as oxygen to keep sao2 above 90%  3/2: Overall prognosis is pretty guarded!!
2/17: on 50% FM: doing ok: off bipap : expect slow improvement in oxygenation given his underlying pulmonary fibrosis:
2/18: doing poorly: on ionotropes as well as bipap for resp support: Pt has end stage pulmonary fibrosis: echo shows ? shunt?
2/18: doing poorly: on ionotropes as well as bipap for resp support: Pt has end stage pulmonary fibrosis: echo shows ? shunt?  2/19: cont to be hypotensive as well as requiring bipap prn as well as feels SOB: Cont to optimize pressors for his ow blood pressure: Try to wean him off bipap
pt is being dialyzed to maximum. Pt is on supranormal midodrine doses. Is it possible there is a right-to-left atrial shunt? Would a repeat bubble study be indicated? If not, I would consider palliative input for GOC.   I have added prednisone to attempt to assist with the respiratory aspect of his hypoxemia without any clear indication.   I have discussed with him basic GOC. He states that he would want "everything done" in case of cardiac arrest.   His HCP are wife and sister. I have discussed with him the low probability of success of CPR in his case.   2/28: cont current treatment  3/1: for now cont steroids as well as bipap at night as well as oxygen to keep sao2 above 90%  3/2: Overall prognosis is pretty guarded!!  3/3: doing ok : decrease steroids to 15 mg a day
pt is being dialyzed to maximum. Pt is on supranormal midodrine doses. Is it possible there is a right-to-left atrial shunt? Would a repeat bubble study be indicated? If not, I would consider palliative input for GOC.   I have added prednisone to attempt to assist with the respiratory aspect of his hypoxemia without any clear indication.   I have discussed with him basic GOC. He states that he would want "everything done" in case of cardiac arrest.   His HCP are wife and sister. I have discussed with him the low probability of success of CPR in his case.   2/28: cont current treatment  3/1: for now cont steroids as well as bipap at night as well as oxygen to keep sao2 above 90%  3/2: Overall prognosis is pretty guarded!!  3/3: doing ok : decrease steroids to 15 mg a day  3/4: decrease prednisone to 10 mg tomorrow
pt is being dialyzed to maximum. Pt is on supranormal midodrine doses. Is it possible there is a right-to-left atrial shunt? Would a repeat bubble study be indicated? If not, I would consider palliative input for GOC.   I have added prednisone to attempt to assist with the respiratory aspect of his hypoxemia without any clear indication.   I have discussed with him basic GOC. He states that he would want "everything done" in case of cardiac arrest.   His HCP are wife and sister. I have discussed with him the low probability of success of CPR in his case.   2/28: cont curretn treatment
add low dose steroids  on high dose midodrine as well as   further discussion of GOC?
pt is being dialyzed to maximum. Pt is on supranormal midodrine doses. Is it possible there is a right-to-left atrial shunt? Would a repeat bubble study be indicated? If not, I would consider palliative input for GOC.   I have added prednisone to attempt to assist with the respiratory aspect of his hypoxemia without any clear indication.   I have discussed with him basic GOC. He states that he would want "everything done" in case of cardiac arrest.   His HCP are wife and sister. I have discussed with him the low probability of success of CPR in his case.   Time spent on GOC: 15 minutes. I, the patient, and son were present.
62M with ESRD (HD MWF), NICM s/p ICD, PICC line in place with home dobutamine drip, atrial fibrillation on coumadin, COPD on home O2 (4-5L), pulmonary fibrosis, hypotension on midodrine admitted with SOB secondary to influenza B infection.   --pt c/o twitching and tremor- d/w pharmacy- likely d/t tamiflu - will d/c- supportive care for influenza  --attempting to wean off biPAP- pt on oxygen mask 100% felt like he couldn't get air in- will cont for now- switch to NC high flow in am  --continue current inotropic support for severe chronic systolic CHF  --HD per renal, midodrine for BP support  --prognosis guarded- if minimal improvement in respiratory status will discuss GOC
62M with ESRD (HD MWF), NICM s/p ICD, PICC line in place with home dobutamine drip, atrial fibrillation on coumadin, COPD on home O2 (4-5L), pulmonary fibrosis, hypotension on midodrine admitted with SOB secondary to influenza B infection.   --tamiflu, supportive care for influenza  --attempting to wean off biPAP  --continue current inotropic support for severe chronic systolic CHF  --HD per renal, midodrine for BP support
62M with ESRD (HD MWF), NICM s/p ICD, PICC line in place with home dobutamine drip, atrial fibrillation on coumadin, COPD on home O2 (4-5L), pulmonary fibrosis, hypotension on midodrine admitted with SOB secondary to influenza B infection.   --tamiflu, supportive care for influenza  --attempting to wean off biPAP  --continue current inotropic support for severe chronic systolic CHF  --HD per renal, midodrine for BP support

## 2018-03-23 NOTE — PROGRESS NOTE ADULT - PROBLEM SELECTOR PLAN 1
- Multifactorial due to advanced COPD, pulmonary fibrosis and end stage heart failure.  No additional means of therapy will reverse his chronic illnesses at this point.  - Supplemental oxygen to maintain O2 sat >88% s/p completion of prolonged steroid taper  - BiPAP QHS  - scant hemoptysis, per pt ongoing for 2 weeks, likely due to underlying advance lung dz, chronic CHF with pulm edema, and A/C. CT chest wo changes

## 2018-03-23 NOTE — PROGRESS NOTE ADULT - NSHPATTENDINGPLANDISCUSS_GEN_ALL_CORE
pt, HD RN
pt
pt, CCU resident
pt, HD RN
pt, family bedside, CCU team
pt, family bedside, and PA
pt, wife bedside and Hospitalist
pt, wife bedside, RN
pt, wife bedside, RN and resident earlier
pt
pt, family bedside and Hospitalist earlier
patient , Tele PA
patient and family at bedside

## 2018-03-23 NOTE — PROGRESS NOTE ADULT - SUBJECTIVE AND OBJECTIVE BOX
Patient is a 62y old  Male who presents with a chief complaint of SOB x few hours (20 Feb 2018 18:05)      Any change in ROS: he is feeling weak today as well as some sob:  he has not coughed up any phlegm overnight and hence he did not store any phlegm for me to visualise:       MEDICATIONS  (STANDING):  ALBUTerol/ipratropium for Nebulization 3 milliLiter(s) Nebulizer every 6 hours  amiodarone    Tablet 100 milliGRAM(s) Oral daily  aspirin enteric coated 81 milliGRAM(s) Oral daily  atorvastatin 80 milliGRAM(s) Oral at bedtime  buDESOnide   90 MICROgram(s) Inhaler 2 Puff(s) Inhalation two times a day  dextrose 5%. 1000 milliLiter(s) (50 mL/Hr) IV Continuous <Continuous>  dextrose 5%. 1000 milliLiter(s) (50 mL/Hr) IV Continuous <Continuous>  dextrose 50% Injectable 12.5 Gram(s) IV Push once  dextrose 50% Injectable 25 Gram(s) IV Push once  dextrose 50% Injectable 25 Gram(s) IV Push once  DOBUTamine Infusion 7.5 MICROgram(s)/kG/Min (16.942 mL/Hr) IV Continuous <Continuous>  docusate sodium 100 milliGRAM(s) Oral two times a day  finasteride 5 milliGRAM(s) Oral daily  influenza   Vaccine 0.5 milliLiter(s) IntraMuscular once  insulin glargine Injectable (LANTUS) 20 Unit(s) SubCutaneous at bedtime  insulin lispro (HumaLOG) corrective regimen sliding scale   SubCutaneous three times a day before meals  insulin lispro (HumaLOG) corrective regimen sliding scale   SubCutaneous at bedtime  levothyroxine 75 MICROGram(s) Oral daily  midodrine 30 milliGRAM(s) Oral three times a day  MIRACLE MOUTHWASH 30 milliLiter(s) Swish and Spit three times a day  Nephro-lynda 1 Tablet(s) Oral daily  polyethylene glycol 3350 17 Gram(s) Oral daily  Saliva Substitute (CAPHOSOL) 30 milliLiter(s) Swish and Spit every 6 hours  senna 2 Tablet(s) Oral at bedtime  warfarin 3 milliGRAM(s) Oral once    MEDICATIONS  (PRN):  benzocaine 15 mG/menthol 3.6 mG Lozenge 1 Lozenge Oral every 6 hours PRN Sore Throat  dextrose Gel 1 Dose(s) Oral once PRN Blood Glucose LESS THAN 70 milliGRAM(s)/deciLiter  dextrose Gel 1 Dose(s) Oral once PRN Blood Glucose LESS THAN 70 milliGRAM(s)/deciliter  glucagon  Injectable 1 milliGRAM(s) IntraMuscular once PRN Glucose <70 milliGRAM(s)/deciLiter  guaiFENesin   Syrup  (Sugar-Free) 200 milliGRAM(s) Oral every 6 hours PRN Cough  sodium chloride 0.65% Nasal 1 Spray(s) Both Nostrils every 2 hours PRN Nasal Congestion    Vital Signs Last 24 Hrs  T(C): 36.4 (23 Mar 2018 10:35), Max: 36.7 (22 Mar 2018 17:30)  T(F): 97.5 (23 Mar 2018 10:35), Max: 98 (22 Mar 2018 17:30)  HR: 80 (23 Mar 2018 11:08) (71 - 102)  BP: 97/47 (23 Mar 2018 10:35) (89/58 - 120/58)  BP(mean): --  RR: 18 (23 Mar 2018 10:35) (16 - 18)  SpO2: 93% (23 Mar 2018 11:08) (93% - 98%)    I&O's Summary    22 Mar 2018 07:01  -  23 Mar 2018 07:00  --------------------------------------------------------  IN: 400 mL / OUT: 3900 mL / NET: -3500 mL    23 Mar 2018 07:01  -  23 Mar 2018 13:58  --------------------------------------------------------  IN: 400 mL / OUT: 3400 mL / NET: -3000 mL          Physical Exam:   GENERAL: NAD, well-groomed, well-developed  HEENT: BELL/   Atraumatic, Normocephalic  ENMT: No tonsillar erythema, exudates, or enlargement; Moist mucous membranes, Good dentition, No lesions  NECK: Supple, No JVD, Normal thyroid  CHEST/LUNG: scattered crackles   CVS: Regular rate and rhythm; No murmurs, rubs, or gallops  GI: : Soft, Nontender, Nondistended; Bowel sounds present  NERVOUS SYSTEM:  Alert & Oriented X3  EXTREMITIES:  + edema  LYMPH: No lymphadenopathy noted  SKIN: No rashes or lesions  ENDOCRINOLOGY: No Thyromegaly  PSYCH: Appropriate    Labs:                              10.8   15.07 )-----------( 104      ( 23 Mar 2018 06:35 )             34.1                         10.0   7.46  )-----------( 95       ( 22 Mar 2018 06:20 )             32.0                         10.7   8.16  )-----------( 98       ( 21 Mar 2018 06:45 )             33.4                         11.0   8.87  )-----------( 104      ( 20 Mar 2018 05:25 )             35.7     03-23    136  |  94<L>  |  54<H>  ----------------------------<  117<H>  4.0   |  27  |  3.67<H>  03-22    136  |  93<L>  |  52<H>  ----------------------------<  139<H>  3.6   |  29  |  3.87<H>  03-21    138  |  96<L>  |  64<H>  ----------------------------<  128<H>  4.0   |  26  |  4.97<H>  03-20    138  |  96<L>  |  41<H>  ----------------------------<  115<H>  3.6   |  27  |  3.60<H>    Ca    8.7      23 Mar 2018 06:35  Ca    8.7      22 Mar 2018 06:20  Mg     2.3     03-23  Mg     2.1     03-22      CAPILLARY BLOOD GLUCOSE      POCT Blood Glucose.: 246 mg/dL (23 Mar 2018 13:15)  POCT Blood Glucose.: 80 mg/dL (23 Mar 2018 08:44)  POCT Blood Glucose.: 156 mg/dL (23 Mar 2018 04:30)  POCT Blood Glucose.: 77 mg/dL (23 Mar 2018 03:43)  POCT Blood Glucose.: 193 mg/dL (22 Mar 2018 22:54)  POCT Blood Glucose.: 187 mg/dL (22 Mar 2018 17:44)        PT/INR - ( 23 Mar 2018 06:35 )   PT: 21.9 SEC;   INR: 1.88              Cultures:             < from: CT Chest No Cont (03.22.18 @ 16:15) >  PROCEDURE DATE:  Mar 22 2018         INTERPRETATION:  CLINICAL INFORMATION: Hemoptysis. History of pulmonary   fibrosis, COPD, CHF, end-stage renal disease.    COMPARISON: June 5, 2017.    PROCEDURE:   CT of the Chest was performed without intravenous contrast.  Sagittal and coronal reformats were performed.    FINDINGS:    LUNGS AND LARGE AIRWAYS: Unchanged traction bronchiectasis and bilateral   groundglass opacities.  PLEURA: Small right pleural effusion.  VESSELS: Right-sided central venous catheter with tip terminating in the   right atrium.   HEART: Cardiomegaly. Left chest wall ICD. No pericardial effusion.  MEDIASTINUM AND ASH: No lymphadenopathy.  CHEST WALL AND LOWER NECK: Within normal limits.  VISUALIZED UPPER ABDOMEN: Partially imaged dense material inside the   gallbladder, possibly gallstones.  BONES: Degenerative changes of the spine.    IMPRESSION:   Unchanged interstitial lung disease. No new consolidation.                  KELVIN CARMONA M.D., ATTENDING RADIOLOGIST  This document has been electronically signed. Mar 22 2018  4:42PM        < end of copied text >                  Studies  Chest X-RAY  CT SCAN Chest   Venous Dopplers: LE:   CT Abdomen  Others

## 2018-03-23 NOTE — PROGRESS NOTE ADULT - SUBJECTIVE AND OBJECTIVE BOX
Pt seen and examined at bedside    Dyspnea at baseline. no complaints today  completed hd earlier today, tolerated well  denies chest pain, or N/V.     Allergies:  No Known Allergies    Hospital Medications:   MEDICATIONS  (STANDING):  ALBUTerol/ipratropium for Nebulization 3 milliLiter(s) Nebulizer every 6 hours  amiodarone    Tablet 100 milliGRAM(s) Oral daily  aspirin enteric coated 81 milliGRAM(s) Oral daily  atorvastatin 80 milliGRAM(s) Oral at bedtime  buDESOnide   90 MICROgram(s) Inhaler 2 Puff(s) Inhalation two times a day  dextrose 5%. 1000 milliLiter(s) (50 mL/Hr) IV Continuous <Continuous>  dextrose 5%. 1000 milliLiter(s) (50 mL/Hr) IV Continuous <Continuous>  dextrose 50% Injectable 12.5 Gram(s) IV Push once  dextrose 50% Injectable 25 Gram(s) IV Push once  dextrose 50% Injectable 25 Gram(s) IV Push once  DOBUTamine Infusion 7.5 MICROgram(s)/kG/Min (16.942 mL/Hr) IV Continuous <Continuous>  docusate sodium 100 milliGRAM(s) Oral two times a day  finasteride 5 milliGRAM(s) Oral daily  influenza   Vaccine 0.5 milliLiter(s) IntraMuscular once  insulin glargine Injectable (LANTUS) 20 Unit(s) SubCutaneous at bedtime  insulin lispro (HumaLOG) corrective regimen sliding scale   SubCutaneous three times a day before meals  insulin lispro (HumaLOG) corrective regimen sliding scale   SubCutaneous at bedtime  levothyroxine 75 MICROGram(s) Oral daily  midodrine 30 milliGRAM(s) Oral three times a day  MIRACLE MOUTHWASH 30 milliLiter(s) Swish and Spit three times a day  Nephro-lynda 1 Tablet(s) Oral daily  polyethylene glycol 3350 17 Gram(s) Oral daily  Saliva Substitute (CAPHOSOL) 30 milliLiter(s) Swish and Spit every 6 hours  senna 2 Tablet(s) Oral at bedtime  warfarin 3 milliGRAM(s) Oral once    VITALS:  Vital Signs Last 24 Hrs  T(C): 36.6 (23 Mar 2018 14:04), Max: 36.7 (22 Mar 2018 17:30)  T(F): 97.8 (23 Mar 2018 14:04), Max: 98 (22 Mar 2018 17:30)  HR: 95 (23 Mar 2018 15:31) (71 - 102)  BP: 100/51 (23 Mar 2018 14:04) (89/58 - 120/58)  BP(mean): --  RR: 17 (23 Mar 2018 14:04) (16 - 18)  SpO2: 91% (23 Mar 2018 15:31) (90% - 98%)    I&O's Summary    22 Mar 2018 07:01  -  23 Mar 2018 07:00  --------------------------------------------------------  IN: 400 mL / OUT: 3900 mL / NET: -3500 mL    23 Mar 2018 07:01  -  23 Mar 2018 15:59  --------------------------------------------------------  IN: 400 mL / OUT: 3400 mL / NET: -3000 mL      PHYSICAL EXAM:  Constitutional: NAD  HEENT: anicteric sclera, oropharynx clear, MMM  Neck: No JVD  Respiratory: +bibasilar crackles. no wheezing  Cardiovascular: S1, S2, RRR  Gastrointestinal: BS+, soft, NT/ND  Extremities: No cyanosis or clubbing. 2+peripheral edema  Neurological: A/O x 3, no focal deficits  Psychiatric: Normal mood, normal affect  : No CVA tenderness. No rosa.   Skin: No rashes  Vascular Access: [facility] tunneled cath     LABS:  03-23    136  |  94<L>  |  54<H>  ----------------------------<  117<H>  4.0   |  27  |  3.67<H>    Ca    8.7      23 Mar 2018 06:35  Mg     2.3     03-23                          10.8   15.07 )-----------( 104      ( 23 Mar 2018 06:35 )             34.1       Urine Studies:      RADIOLOGY & ADDITIONAL STUDIES:

## 2018-03-23 NOTE — PROGRESS NOTE ADULT - PROBLEM SELECTOR PLAN 1
completed HD earlier today, rx sheet reviewed, net uf 3L achieved, bp was stable, tolerated it well.  stable for d/c renal stand point. pt has [place] [facility] [place]  c/w renal diet, fluid restriction 1.2L/day, reinforced w/pt again  ok to d/c from renal stand point; doubt pt can improved any more clinically.  chronic volume overload, will provide extra PUF outpt prn, based on the spot availability.   end stage heart failure and pulm fibrosis. Pt resistant to withdrawing any care at this time.

## 2018-03-23 NOTE — PROGRESS NOTE ADULT - PROBLEM SELECTOR PLAN 4
3/2: cont steroids  3/3:major issue is pulmonary fibrosis: cont current care:  3/14: cont current care:  3/5: can change pulmicort to symbicort:  3/6: cont low dose steroids  3/7: on steroids  3/8: steoids taper  3/9: Cont 10 mg of steroids at this time  3/10: cont 10 mg , will change to 5 mg soon  3/11: no wheezing: today : michael change to po steroids twice day : till her MARGIE is done tomorrow:  3/12: no wheezing: cont BD : taper steroids to off  3/13: Cont Pulmicort: doubt he will be able to use Symbicort properly:  3/14: cont pulmicort as well as bronchodilators:  3/15: Cont bronchodilators:  3/17 Pulmicort, Duoneb  3/18 stable with current management. continue current management  3/19: on pulmicort as well as nebs  3/20: Cont BD : no wheezing  3/21: no wheezing:  3/23; n o wheezing: cont nebulizers prn

## 2018-04-04 NOTE — SWALLOW BEDSIDE ASSESSMENT ADULT - SLP PRECAUTIONS/LIMITATIONS: VISION
Chief Complaint   Patient presents with   [facility] Follow Up     ear infection & bronchiloitis     Subjective:   Yahaira Stephen is a 13 m.o. male brought by father with for follow up from an ER visit on 4/2/18 with complaints of fever, difficulty breathing, nasal congestion & dx with left otitis media and WARI. Patient started on amoxicillin and neb treatments every 4 hours. Patient also seen in ER on 3/24/18 for similar symptoms with noted stridor and treated as croup. Dad notes patient has improved but continues to spit up mucus at times. At home, patient has been receiving albuterol BID and dad has only heard patient wheeze one time. Patient has returned to  with no issues.  stated they would not be able to administer neb treatment without a note and mom had called requesting documentation yesterday. Mom had noted patient greatly improved and it was advised to give albuterol BID and see how he does without having treatment at  especially since patient was returning to office for visit today. Parents observations of the patient at home are normal activity, mood and playfulness, normal appetite, normal fluid intake, normal sleep, normal urination and normal stools. Denies a history of fatigue, fevers, nausea, rash and shortness of breath. ROS  Extensive ROS negative except those stated above in HPI    Evaluation to date: ER visits: 3/24/18, 4/2/18. Treatment to date: amoxicillin (on day 2), albuterol BID. Relevant PMH: No pertinent additional PMH. Current Outpatient Prescriptions on File Prior to Visit   Medication Sig Dispense Refill    amoxicillin (AMOXIL) 400 mg/5 mL suspension Take 5.8 mL by mouth two (2) times a day for 10 days. 116 mL 0     No current facility-administered medications on file prior to visit.       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 Chest - faint expiratory wheezes posteriorly; congested cough; no WOB, no elevated RR, rales or rhonchi,   symmetric air entry  Heart: no murmur, regular rate and rhythm, normal S1 and S2  Abdomen: no masses palpated, no organomegaly or tenderness; nabs. No rebound or guarding  Skin: Normal with no rashes noted. Extremities: normal;  Good cap refill and FROM    Assessment/Plan:       ICD-10-CM ICD-9-CM    1. Wheezing-associated respiratory infection (WARI) J98.8 519.8    2. Acute suppurative otitis media of right ear without spontaneous rupture of tympanic membrane, recurrence not specified H66.001 382.00    3. Otitis media follow-up, infection resolved Z09 V67.59     Z86.69 V12.40    4. Follow-up exam Z09 V67.9      Continue with amoxicillin as prescribed. Will keep albuterol BID for next 48 hours and then PRN for wheezing, distress. Reassured patient looked well on exam today, breathing comfortably and in good spirits. RTC in one week for ear & wheezing follow up. Mom to call and make appt. Plan and evaluation (above) reviewed with parent(s) at visit. Parent(s) voiced understanding of plan and provided with time to ask/review questions. After Visit Summary (AVS) provided to parent(s) with additional instructions as needed/reviewed. Follow-up Disposition:  Return in about 1 week (around 4/11/2018). within functional limits

## 2018-04-21 NOTE — PROGRESS NOTE ADULT - PROBLEM SELECTOR PLAN 6
Problem: Patient Care Overview  Goal: Plan of Care/Patient Progress Review  Outcome: No Change  No nausea. Pain treated with Dilaudid twice which is helpful per patient report. Continue with current plan of nursing care, and update MD with concerns as needed.       -c/w dialysis per nephro recs  -caution with IVF

## 2018-05-25 NOTE — ED PROVIDER NOTE - EYES NEGATIVE STATEMENT, MLM
no discharge, no irritation, no pain, no redness, and no visual changes. Alert and oriented to person, place and time

## 2018-05-27 ENCOUNTER — INPATIENT (INPATIENT)
Facility: HOSPITAL | Age: 63
LOS: 12 days | End: 2018-06-09
Attending: HOSPITALIST | Admitting: HOSPITALIST
Payer: COMMERCIAL

## 2018-05-27 VITALS
HEART RATE: 67 BPM | DIASTOLIC BLOOD PRESSURE: 57 MMHG | RESPIRATION RATE: 24 BRPM | TEMPERATURE: 98 F | SYSTOLIC BLOOD PRESSURE: 93 MMHG | OXYGEN SATURATION: 100 %

## 2018-05-27 DIAGNOSIS — I48.91 UNSPECIFIED ATRIAL FIBRILLATION: ICD-10-CM

## 2018-05-27 DIAGNOSIS — R06.02 SHORTNESS OF BREATH: ICD-10-CM

## 2018-05-27 DIAGNOSIS — E78.5 HYPERLIPIDEMIA, UNSPECIFIED: ICD-10-CM

## 2018-05-27 DIAGNOSIS — Z29.9 ENCOUNTER FOR PROPHYLACTIC MEASURES, UNSPECIFIED: ICD-10-CM

## 2018-05-27 DIAGNOSIS — N18.6 END STAGE RENAL DISEASE: ICD-10-CM

## 2018-05-27 DIAGNOSIS — J44.9 CHRONIC OBSTRUCTIVE PULMONARY DISEASE, UNSPECIFIED: ICD-10-CM

## 2018-05-27 DIAGNOSIS — I50.9 HEART FAILURE, UNSPECIFIED: ICD-10-CM

## 2018-05-27 DIAGNOSIS — E11.9 TYPE 2 DIABETES MELLITUS WITHOUT COMPLICATIONS: ICD-10-CM

## 2018-05-27 LAB
ALBUMIN SERPL ELPH-MCNC: 3.3 G/DL — SIGNIFICANT CHANGE UP (ref 3.3–5)
ALP SERPL-CCNC: 215 U/L — HIGH (ref 40–120)
ALT FLD-CCNC: 29 U/L — SIGNIFICANT CHANGE UP (ref 4–41)
APTT BLD: 45.4 SEC — HIGH (ref 27.5–37.4)
AST SERPL-CCNC: 30 U/L — SIGNIFICANT CHANGE UP (ref 4–40)
BASE EXCESS BLDV CALC-SCNC: 2.2 MMOL/L — SIGNIFICANT CHANGE UP
BASOPHILS # BLD AUTO: 0.03 K/UL — SIGNIFICANT CHANGE UP (ref 0–0.2)
BASOPHILS NFR BLD AUTO: 0.5 % — SIGNIFICANT CHANGE UP (ref 0–2)
BILIRUB SERPL-MCNC: 0.4 MG/DL — SIGNIFICANT CHANGE UP (ref 0.2–1.2)
BLOOD GAS VENOUS - CREATININE: SIGNIFICANT CHANGE UP MG/DL (ref 0.5–1.3)
BUN SERPL-MCNC: 43 MG/DL — HIGH (ref 7–23)
CALCIUM SERPL-MCNC: 8.9 MG/DL — SIGNIFICANT CHANGE UP (ref 8.4–10.5)
CHLORIDE BLDV-SCNC: 99 MMOL/L — SIGNIFICANT CHANGE UP (ref 96–108)
CHLORIDE SERPL-SCNC: 94 MMOL/L — LOW (ref 98–107)
CO2 SERPL-SCNC: 26 MMOL/L — SIGNIFICANT CHANGE UP (ref 22–31)
CREAT SERPL-MCNC: 4.88 MG/DL — HIGH (ref 0.5–1.3)
EOSINOPHIL # BLD AUTO: 0.14 K/UL — SIGNIFICANT CHANGE UP (ref 0–0.5)
EOSINOPHIL NFR BLD AUTO: 2.4 % — SIGNIFICANT CHANGE UP (ref 0–6)
GAS PNL BLDV: 135 MMOL/L — LOW (ref 136–146)
GLUCOSE BLDV-MCNC: 252 — HIGH (ref 70–99)
GLUCOSE SERPL-MCNC: 238 MG/DL — HIGH (ref 70–99)
HCO3 BLDV-SCNC: 24 MMOL/L — SIGNIFICANT CHANGE UP (ref 20–27)
HCT VFR BLD CALC: 38.4 % — LOW (ref 39–50)
HCT VFR BLDV CALC: 35.1 % — LOW (ref 39–51)
HGB BLD-MCNC: 11.6 G/DL — LOW (ref 13–17)
HGB BLDV-MCNC: 11.4 G/DL — LOW (ref 13–17)
IMM GRANULOCYTES # BLD AUTO: 0.01 # — SIGNIFICANT CHANGE UP
IMM GRANULOCYTES NFR BLD AUTO: 0.2 % — SIGNIFICANT CHANGE UP (ref 0–1.5)
INR BLD: 1.32 — HIGH (ref 0.88–1.17)
LACTATE BLDV-MCNC: 1.7 MMOL/L — SIGNIFICANT CHANGE UP (ref 0.5–2)
LYMPHOCYTES # BLD AUTO: 1.21 K/UL — SIGNIFICANT CHANGE UP (ref 1–3.3)
LYMPHOCYTES # BLD AUTO: 20.6 % — SIGNIFICANT CHANGE UP (ref 13–44)
MCHC RBC-ENTMCNC: 30.2 % — LOW (ref 32–36)
MCHC RBC-ENTMCNC: 31.5 PG — SIGNIFICANT CHANGE UP (ref 27–34)
MCV RBC AUTO: 104.3 FL — HIGH (ref 80–100)
MONOCYTES # BLD AUTO: 0.69 K/UL — SIGNIFICANT CHANGE UP (ref 0–0.9)
MONOCYTES NFR BLD AUTO: 11.8 % — SIGNIFICANT CHANGE UP (ref 2–14)
NEUTROPHILS # BLD AUTO: 3.79 K/UL — SIGNIFICANT CHANGE UP (ref 1.8–7.4)
NEUTROPHILS NFR BLD AUTO: 64.5 % — SIGNIFICANT CHANGE UP (ref 43–77)
NRBC # FLD: 0 — SIGNIFICANT CHANGE UP
PCO2 BLDV: 61 MMHG — HIGH (ref 41–51)
PH BLDV: 7.29 PH — LOW (ref 7.32–7.43)
PLATELET # BLD AUTO: 103 K/UL — LOW (ref 150–400)
PMV BLD: 12.4 FL — SIGNIFICANT CHANGE UP (ref 7–13)
PO2 BLDV: 24 MMHG — LOW (ref 35–40)
POTASSIUM BLDV-SCNC: 3.9 MMOL/L — SIGNIFICANT CHANGE UP (ref 3.4–4.5)
POTASSIUM SERPL-MCNC: 4.1 MMOL/L — SIGNIFICANT CHANGE UP (ref 3.5–5.3)
POTASSIUM SERPL-SCNC: 4.1 MMOL/L — SIGNIFICANT CHANGE UP (ref 3.5–5.3)
PROT SERPL-MCNC: 8.1 G/DL — SIGNIFICANT CHANGE UP (ref 6–8.3)
PROTHROM AB SERPL-ACNC: 15.3 SEC — HIGH (ref 9.8–13.1)
RBC # BLD: 3.68 M/UL — LOW (ref 4.2–5.8)
RBC # FLD: 16 % — HIGH (ref 10.3–14.5)
SAO2 % BLDV: 36.5 % — LOW (ref 60–85)
SODIUM SERPL-SCNC: 133 MMOL/L — LOW (ref 135–145)
WBC # BLD: 5.87 K/UL — SIGNIFICANT CHANGE UP (ref 3.8–10.5)
WBC # FLD AUTO: 5.87 K/UL — SIGNIFICANT CHANGE UP (ref 3.8–10.5)

## 2018-05-27 PROCEDURE — 99223 1ST HOSP IP/OBS HIGH 75: CPT | Mod: GC

## 2018-05-27 PROCEDURE — 71045 X-RAY EXAM CHEST 1 VIEW: CPT | Mod: 26

## 2018-05-27 RX ORDER — SEVELAMER CARBONATE 2400 MG/1
1600 POWDER, FOR SUSPENSION ORAL THREE TIMES A DAY
Qty: 0 | Refills: 0 | Status: DISCONTINUED | OUTPATIENT
Start: 2018-05-27 | End: 2018-05-28

## 2018-05-27 RX ORDER — SENNA PLUS 8.6 MG/1
2 TABLET ORAL AT BEDTIME
Qty: 0 | Refills: 0 | Status: DISCONTINUED | OUTPATIENT
Start: 2018-05-27 | End: 2018-05-29

## 2018-05-27 RX ORDER — ASPIRIN/CALCIUM CARB/MAGNESIUM 324 MG
81 TABLET ORAL DAILY
Qty: 0 | Refills: 0 | Status: DISCONTINUED | OUTPATIENT
Start: 2018-05-27 | End: 2018-05-29

## 2018-05-27 RX ORDER — AMIODARONE HYDROCHLORIDE 400 MG/1
100 TABLET ORAL DAILY
Qty: 0 | Refills: 0 | Status: DISCONTINUED | OUTPATIENT
Start: 2018-05-27 | End: 2018-06-09

## 2018-05-27 RX ORDER — WARFARIN SODIUM 2.5 MG/1
3 TABLET ORAL ONCE
Qty: 0 | Refills: 0 | Status: COMPLETED | OUTPATIENT
Start: 2018-05-27 | End: 2018-05-27

## 2018-05-27 RX ORDER — LEVOTHYROXINE SODIUM 125 MCG
75 TABLET ORAL DAILY
Qty: 0 | Refills: 0 | Status: DISCONTINUED | OUTPATIENT
Start: 2018-05-27 | End: 2018-06-09

## 2018-05-27 RX ORDER — DOBUTAMINE HCL 250MG/20ML
7.5 VIAL (ML) INTRAVENOUS
Qty: 1000 | Refills: 0 | Status: DISCONTINUED | OUTPATIENT
Start: 2018-05-27 | End: 2018-05-27

## 2018-05-27 RX ORDER — FINASTERIDE 5 MG/1
5 TABLET, FILM COATED ORAL DAILY
Qty: 0 | Refills: 0 | Status: DISCONTINUED | OUTPATIENT
Start: 2018-05-27 | End: 2018-06-09

## 2018-05-27 RX ORDER — IPRATROPIUM/ALBUTEROL SULFATE 18-103MCG
3 AEROSOL WITH ADAPTER (GRAM) INHALATION EVERY 6 HOURS
Qty: 0 | Refills: 0 | Status: DISCONTINUED | OUTPATIENT
Start: 2018-05-27 | End: 2018-06-09

## 2018-05-27 RX ORDER — DOCUSATE SODIUM 100 MG
100 CAPSULE ORAL
Qty: 0 | Refills: 0 | Status: DISCONTINUED | OUTPATIENT
Start: 2018-05-27 | End: 2018-05-31

## 2018-05-27 RX ORDER — DOBUTAMINE HCL 250MG/20ML
5 VIAL (ML) INTRAVENOUS
Qty: 500 | Refills: 0 | Status: DISCONTINUED | OUTPATIENT
Start: 2018-05-27 | End: 2018-05-30

## 2018-05-27 RX ORDER — SEVELAMER CARBONATE 2400 MG/1
2 POWDER, FOR SUSPENSION ORAL
Qty: 0 | Refills: 0 | COMMUNITY

## 2018-05-27 RX ORDER — ATORVASTATIN CALCIUM 80 MG/1
80 TABLET, FILM COATED ORAL AT BEDTIME
Qty: 0 | Refills: 0 | Status: DISCONTINUED | OUTPATIENT
Start: 2018-05-27 | End: 2018-06-09

## 2018-05-27 RX ORDER — SEVELAMER CARBONATE 2400 MG/1
800 POWDER, FOR SUSPENSION ORAL THREE TIMES A DAY
Qty: 0 | Refills: 0 | Status: DISCONTINUED | OUTPATIENT
Start: 2018-05-27 | End: 2018-05-27

## 2018-05-27 RX ORDER — MIDODRINE HYDROCHLORIDE 2.5 MG/1
10 TABLET ORAL ONCE
Qty: 0 | Refills: 0 | Status: COMPLETED | OUTPATIENT
Start: 2018-05-27 | End: 2018-05-27

## 2018-05-27 RX ORDER — CHLORHEXIDINE GLUCONATE 213 G/1000ML
1 SOLUTION TOPICAL
Qty: 0 | Refills: 0 | Status: DISCONTINUED | OUTPATIENT
Start: 2018-05-27 | End: 2018-06-09

## 2018-05-27 RX ADMIN — Medication 16.88 MICROGRAM(S)/KG/MIN: at 18:39

## 2018-05-27 RX ADMIN — SENNA PLUS 2 TABLET(S): 8.6 TABLET ORAL at 22:37

## 2018-05-27 RX ADMIN — MIDODRINE HYDROCHLORIDE 10 MILLIGRAM(S): 2.5 TABLET ORAL at 16:06

## 2018-05-27 RX ADMIN — ATORVASTATIN CALCIUM 80 MILLIGRAM(S): 80 TABLET, FILM COATED ORAL at 22:36

## 2018-05-27 RX ADMIN — Medication 100 MILLIGRAM(S): at 22:37

## 2018-05-27 RX ADMIN — Medication 16.88 MICROGRAM(S)/KG/MIN: at 19:35

## 2018-05-27 RX ADMIN — WARFARIN SODIUM 3 MILLIGRAM(S): 2.5 TABLET ORAL at 18:42

## 2018-05-27 RX ADMIN — SEVELAMER CARBONATE 1600 MILLIGRAM(S): 2400 POWDER, FOR SUSPENSION ORAL at 22:36

## 2018-05-27 RX ADMIN — FINASTERIDE 5 MILLIGRAM(S): 5 TABLET, FILM COATED ORAL at 22:35

## 2018-05-27 NOTE — CONSULT NOTE ADULT - SUBJECTIVE AND OBJECTIVE BOX
Date of Admission: 5/27/2018    CHIEF COMPLAINT: nose bleed and SOB    HISTORY OF PRESENT ILLNESS:  63M with HFrEF on home Dobutamine gtt for 3 years, current dose 7.5 mcg, afib on coumadin, ICD (Biotronik), ESRD on HD, COPD, and pulmonary fibrosis presents today with epistasis which resolved with direct pressure. Although he is chronically SOB he states that today he has felt somewhat worse, also with increased LE edema. He received an additional HD treatment yesterday with fair effect. He is admitted to telemetry/medicine with plan for urgent HD however patient is hypotensive but asymptomatic and plan is for transfer to CCU for closer monitoring during HD and possible adjustment of Dobutamine.       Allergies    No Known Allergies    Intolerances    	    MEDICATIONS:  amiodarone    Tablet 100 milliGRAM(s) Oral daily  aspirin enteric coated 81 milliGRAM(s) Oral daily  DOBUTamine Infusion 7.5 MICROgram(s)/kG/Min IV Continuous <Continuous>      ALBUTerol/ipratropium for Nebulization 3 milliLiter(s) Nebulizer every 6 hours PRN      docusate sodium 100 milliGRAM(s) Oral two times a day PRN  senna 2 Tablet(s) Oral at bedtime PRN    atorvastatin 80 milliGRAM(s) Oral at bedtime  finasteride 5 milliGRAM(s) Oral daily  levothyroxine 75 MICROGram(s) Oral daily        PAST MEDICAL & SURGICAL HISTORY:  Seizure: Off Keppra since 7/2017  Chronic hypotension  AF (atrial fibrillation)  COPD (chronic obstructive pulmonary disease): 4L home O2  HLD (hyperlipidemia)  DM (diabetes mellitus): Off Insulin since 7/2017  ESRD (end stage renal disease) on dialysis  BPH (benign prostatic hypertrophy)  Myocardial infarction: 10/2011  Gout  CHF (congestive heart failure)  AICD (automatic cardioverter/defibrillator) present: Biotronic - placed 9/11/09  H/O coronary angiogram      FAMILY HISTORY:  No pertinent family history in first degree relatives      SOCIAL HISTORY:    Smoker  denies  Alcohol  denies  Drugs  denies      REVIEW OF SYSTEMS:  See HPI. Otherwise, 10 point ROS done and otherwise negative.    PHYSICAL EXAM:  T(C): 36.3 (05-27-18 @ 17:24), Max: 36.4 (05-27-18 @ 11:13)  HR: 66 (05-27-18 @ 17:24) (66 - 76)  BP: 80/45 (05-27-18 @ 17:24) (78/47 - 93/57)  RR: 18 (05-27-18 @ 17:24) (15 - 24)  SpO2: 95% (05-27-18 @ 17:24) (84% - 100%)  Wt(kg): --  I&O's Summary      Appearance: NAD, skin W & D, mild SOB at rest  HEENT:   Normal oral mucosa, PERRL, EOMI	  Cardiovascular: S1S2 irreg  Respiratory: decreased breath sounds  Psychiatry: A & O x 3, Mood & affect appropriate  Gastrointestinal:  Soft, Non-tender, + BS	  Skin: No rashes, No ecchymoses, No cyanosis	  Neurologic: Non-focal  Extremities: 2-3+ B/L LE edema          LABS:	 	                        11.6   5.87  )-----------( 103      ( 27 May 2018 11:48 )             38.4       05-27    133<L>  |  94<L>  |  43<H>  ----------------------------<  238<H>  4.1   |  26  |  4.88<H>    Ca    8.9      27 May 2018 11:48    TPro  8.1  /  Alb  3.3  /  TBili  0.4  /  DBili  x   /  AST  30  /  ALT  29  /  AlkPhos  215<H>  05-27    TELEMETRY: 	    ECG:  	  RADIOLOGY:  OTHER: 	    PREVIOUS DIAGNOSTIC TESTING:    [ ] Echocardiogram:    Patient name: Patrice Plascencia  YOB: 1955   Age: 62 (M)   MR#: [id number]  Study Date: 10/18/2017  [address] Mery Garcia  Study quality: Technically good  Referring Physician: Channing Elena MD  Blood Pressure: 81/46 mmHg  Height: 180 cm  Weight: 79 kg  BSA: 2 m2  ------------------------------------------------------------------------  PROCEDURE: Transthoracic echocardiogram with 2-D, M-Mode  and complete spectral and color flow Doppler.  Patient was injected with 10 cc's of aerosolized saline.  INDICATION: Unspecified atrial fibrillation (I48.91),  Unspecified atrial flutter (I48.92)  ------------------------------------------------------------------------  DIMENSIONS:  Dimensions:     Normal Values:  LA:     5.4 cm    2.0 - 4.0 cm  Ao:     2.4 cm    2.0 - 3.8 cm  SEPTUM: 0.7 cm    0.6 - 1.2 cm  PWT:    1.2 cm    0.6 - 1.1 cm  LVIDd:  6.2 cm    3.0 - 5.6 cm  LVIDs:  5.6 cm    1.8 - 4.0 cm  Derived Variables:  LVMI: 123 g/m2  RWT: 0.38  Fractional short: 10 %  Ejection Fraction (Teicholtz): 21 %  ------------------------------------------------------------------------  OBSERVATIONS:  Mitral Valve: Normal mitral valve. Mild mitral  regurgitation.  Aortic Root: Normal aortic root.  Aortic Valve: Normal trileaflet aortic valve.  Left Atrium: Severely dilated left atrium.  LA volume index  = 56 cc/m2.  Left Ventricle: Severe global left ventricular systolic  dysfunction. Flattening of the interventricular septum in  both systole and diastole is  consistent with right  ventricular pressure overload. Moderate left ventricular  enlargement.  Right Heart: Moderate right atrial enlargement. Right  ventricular enlargement with decreased right ventricular  systolic function. A device wire is noted in the right  heart. Normal tricuspid valve.  Moderate tricuspid  regurgitation. Normal pulmonic valve. Mild pulmonic  regurgitation.  Pericardium/PleuraNormal pericardium with trace pericardial  effusion.  Hemodynamic: Estimated right ventricular systolic pressure  equals 55 mm Hg, assuming right atrial pressure equals 10  mm Hg, consistent with moderate pulmonary hypertension.  Agitated saline injection resulted in a large amount of  bubbles seen in the right heart. Although the exact amount  of cardiac cycles that occurred prior to the crossing of  the bubbles is unclear, the findings are suggestive of a  significant shunt. Consider MARGIE for further workup, if  clinically warrnated.  ------------------------------------------------------------------------  CONCLUSIONS:  1. Normal trileaflet aortic valve.  2. Severely dilated left atrium.  LA volume index = 56  cc/m2.  3. Moderate left ventricular enlargement.  4. Severe global left ventricular systolic dysfunction.  Flattening of the interventricular septum in both systole  and diastole is  consistent with right ventricular pressure  overload.  5. Right ventricular enlargement with decreased right  ventricular systolic function. A device wire is noted in  the right heart.  6. Normal tricuspid valve.  Moderate tricuspid  regurgitation.  7. Normal pulmonic valve. Mild pulmonic regurgitation.  Estimated pulmonary artery systolic pressure equals 55 mm  Hg, assuming right atrial pressure equals 10  mm Hg,  consistent with moderate pulmonary hypertension.  8. Agitated saline injection resulted in a large amount of  bubbles seen in the right heart. Although the exact amount  of cardiac cycles that occurred prior to the crossing of  the bubbles is unclear, the findings are suggestive of a  significant shunt. Consider MARGIE for further workup, if  clinically warrnated.  ------------------------------------------------------------------------  Confirmed on  10/18/2017 - 17:36:22 by KEITH Cooper  ------------------------------------------------------------------------

## 2018-05-27 NOTE — H&P ADULT - PROBLEM SELECTOR PLAN 2
Patient with EF 21% on 10/17 TTE. On dobutamine gtt as an outpatient. S/p midodrine in the ED.  - C/w dobutamine 7.5 mcg/kg/min  - Will touch base with cardiology Dr. Davis Patient with EF 21% on 10/17 TTE. On dobutamine gtt as an outpatient. S/p midodrine in the ED.  - C/w dobutamine 7.5 mcg/kg/min. Patient would benefit from titratable  gtt.   - Will c/s CCU.

## 2018-05-27 NOTE — H&P ADULT - ASSESSMENT
64 y/o M PMHx significant for severe CHF, on chronic dobutamine gtt at home x3 years (follows up with Dr. Davis), AFib on coumadin, AICD, ESRD on HD MWF (plus add'l session on Saturday prn), pulmonary fibrosis, presents to the ED with chief complaint of epistaxis that started yesterday. More significantly, patient with chronic SOB that is worsened from baseline, likely multifactorial d/t volume overload likely 2/2 ESRD and CHF. Patient started on dobutamine gtt, but will need titratable dosing. Will consult CCU. 64 y/o M PMHx significant for severe CHF, on chronic dobutamine gtt at home x3 years (follows up with Dr. Davis), AFib on coumadin, AICD, ESRD on HD MWF (plus add'l session on Saturday prn), pulmonary fibrosis, presents to the ED with chief complaint of epistaxis that started yesterday. More significantly, patient with chronic SOB that is worsened from baseline, likely multifactorial d/t volume overload likely 2/2 ESRD and CHF. Patient started on dobutamine gtt, but may need titratable dosing. Will consult CCU. 62 y/o M PMHx significant for severe CHF, on chronic dobutamine gtt at home x3 years (follows up with Dr. Davis), AFib on coumadin, AICD, ESRD on HD MWF (plus add'l session on Saturday prn), pulmonary fibrosis, presents to the ED with chief complaint of epistaxis that started yesterday. More significantly, patient with chronic SOB that is worsened from baseline, likely multifactorial d/t volume overload likely 2/2 ESRD and CHF. Patient has been on dobutamine gtt, but may need titratable dosing. Will consult CCU.

## 2018-05-27 NOTE — ED PROVIDER NOTE - OBJECTIVE STATEMENT
63m w hx severe CHF on chronic dobutamine gtt at home x3 years, afib on coumadin, AICD, ESRD on HD MWF, pulm fibrosis, here today for epistaxis that started yesterday.  had extra HD session this past Sat 63m w hx severe CHF on chronic dobutamine gtt at home x3 years, afib on coumadin, AICD, ESRD on HD MWF, pulm fibrosis, here today for epistaxis that started yesterday. Has had similar episodes in the past but this one more persistent than usual. Denies nosepicking or other trauma to area, not currently tasting blood in mouth. Has chronic SOB and hypoxia at all times baseline sat in 80s (always on 2-3L NC at home), but today has felt more SOB than usual. No chest pain, no cough, no fever. Had extra HD session this past Sat after HD providers felt was still fluid-overloaded Friday session.

## 2018-05-27 NOTE — ED PROVIDER NOTE - CRITICAL CARE PROVIDED
consult w/ pt's family directly relating to pts condition/interpretation of diagnostic studies/consultation with other physicians/documentation/additional history taking

## 2018-05-27 NOTE — H&P ADULT - NSHPPHYSICALEXAM_GEN_ALL_CORE
General: Chronically ill man sitting up in stretcher, NC in place, dyspneic  HEENT: Head NC/AT. EOMI. No pharyngeal erythema or exudate.  Neck: +JVD. Supple  Chest/Lung: Bibasilar crackles, R>L, breathing moderately labored. 2+ pulses B/L LE extremities.   Cardiovascular: Irregular rhythm, regular rate. No murmur  Abdomen: Soft, NT/ND. +BS  Extremities: FROM all extremities  Neuro: CN II-XII intact  Psych: Tearful affect  Skin: Brawny induration B/L LE Vital Signs Last 24 Hrs  T(C): 36.4 (27 May 2018 11:13), Max: 36.4 (27 May 2018 11:13)  T(F): 97.5 (27 May 2018 11:13), Max: 97.5 (27 May 2018 11:13)  HR: 66 (27 May 2018 17:24) (66 - 76)  BP: 80/45 (27 May 2018 17:24) (78/47 - 93/57)  BP(mean): --  RR: 18 (27 May 2018 17:24) (15 - 24)  SpO2: 95% (27 May 2018 17:24) (84% - 100%)    General: Chronically ill man sitting up in stretcher, NC in place, dyspneic  HEENT: Head NC/AT. EOMI. No pharyngeal erythema or exudate.  Neck: +JVD. Supple  Chest/Lung: Bibasilar crackles, R>L, breathing moderately labored. 2+ pulses B/L LE extremities.   Cardiovascular: Irregular rhythm, regular rate. No murmur  Abdomen: Soft, NT/ND. +BS  Extremities: FROM all extremities  Neuro: CN II-XII intact  Psych: Tearful affect  Skin: Brawny induration B/L LE

## 2018-05-27 NOTE — H&P ADULT - PROBLEM SELECTOR PLAN 1
Due to volume overload, likely multifactorial 2/2 ESRD, CHF. Also could be c/b COPD.  - Patient already s/p 4 doses of HD this week  - Management for CHF as below  - C/w supplemental O2 goal Sat 88-92%, COPD management as below.

## 2018-05-27 NOTE — GOALS OF CARE CONVERSATION - PERSONAL ADVANCE DIRECTIVE - CONVERSATION DETAILS
Spoke to patient at length. Patient denied having any advanced directives. After investigation through chart patient has been seen by palliative care on previous admissions given poor prognosis. Patient did not desire an advanced directives at that time and desired to remain full code. Patient was discussed at length about his poor prognosis of his chronic end-stage medical conditions and lack of candidacy for a bridge to destination therapy and heart transplantation. Patient appeared to not understand his prognosis initially but appeared to have comprehended the magnitude of his disease after discussion. Despite this patient desired to remain full code for now. I as a provider stressed importance of discussing the poor prognosis with family and what resuscitation and mechanical ventilation entails including its effect on his quality of life and the patient agreed and will be open for discussion later in hospitalization.

## 2018-05-27 NOTE — CONSULT NOTE ADULT - ASSESSMENT
·	End Stage Renal Disease on Dialysis + congestive heart failure with hypotension on dobutamine gtt with signs of fluid overload,  ·	anemia hb stable  ·	congestive heart failure on dobutamine drip, follow up with

## 2018-05-27 NOTE — H&P ADULT - NSHPLABSRESULTS_GEN_ALL_CORE
Comprehensive Metabolic Panel (05.27.18 @ 11:48)    Sodium, Serum: 133 mmol/L    Potassium, Serum: 4.1 mmol/L    Chloride, Serum: 94 mmol/L    Carbon Dioxide, Serum: 26 mmol/L    Blood Urea Nitrogen, Serum: 43 mg/dL    Creatinine, Serum: 4.88 mg/dL    Glucose, Serum: 238 mg/dL    Calcium, Total Serum: 8.9 mg/dL    Protein Total, Serum: 8.1 g/dL    Albumin, Serum: 3.3 g/dL    Bilirubin Total, Serum: 0.4 mg/dL    Alkaline Phosphatase, Serum: 215 u/L    Aspartate Aminotransferase (AST/SGOT): 30 u/L    Alanine Aminotransferase (ALT/SGPT): 29 u/L    Complete Blood Count + Automated Diff (05.27.18 @ 11:48)    Nucleated RBC #: 0    WBC Count: 5.87 K/uL    RBC Count: 3.68 M/uL    Hemoglobin: 11.6 g/dL    Hematocrit: 38.4 %    Mean Cell Volume: 104.3 fL    Mean Cell Hemoglobin: 31.5 pg    Mean Cell Hemoglobin Conc: 30.2 %    Red Cell Distrib Width: 16.0 %    Platelet Count - Automated: 103 K/uL    MPV: 12.4 fl    Auto Neutrophil #: 3.79 K/uL    Auto Lymphocyte #: 1.21 K/uL    Auto Monocyte #: 0.69 K/uL    Auto Eosinophil #: 0.14 K/uL    Auto Basophil #: 0.03 K/uL    Auto Immature Granulocyte #: 0.01: (Includes meta, myelo and promyelocytes) #    Auto Neutrophil %: 64.5 %    Auto Lymphocyte %: 20.6 %    Auto Monocyte %: 11.8 %    Auto Eosinophil %: 2.4 %    Auto Basophil %: 0.5 %    Auto Immature Granulocyte %: 0.2: (Includes meta, myelo and promyelocytes) %    < from: Xray Chest 1 View- PORTABLE-Urgent (05.27.18 @ 12:13) >    ******PRELIMINARY REPORT******            INTERPRETATION:  Pulmonary edema. Right pleural effusion.    < end of copied text >

## 2018-05-27 NOTE — CONSULT NOTE ADULT - ATTENDING COMMENTS
Patrice Plascencia is a 63 year old man with history of HFrEF on home Dobutamine gtt for 3 years, current dose 7.5 mcg/kg/min. There is also h/o  afib on warfarin with [id number] VASc  5 points, ICD (Biotronik), ESRD on HD, COPD, CAD with prior MI 2011, siezures (of Keppra since July 2017) and pulmonary fibrosis.  He presented with epistaxsis which resolved with direct pressure. Of concern was acute exacerbation of  chronic SOB associated with increased LE edema. He received an additional HD treatment yesterday with fair effect. He was admitted to telemetry/medicine with plan for urgent HD however there is baseline hypotension (asymptomatic). He will therefore transfer to CCU for closer monitoring during HD and possible adjustment of Dobutamine. TTE from 10/18/17 showed normal mitral valve, with mild regurgitation. The aortic root was normal. The aortic valve was trileaflet and appeared normal. The left atrium was severely dilated, with  LA volume index = 56 cc/m2. There was severe global left ventricular systolic dysfunction with LVEF = 21%. Flattening of the interventricular septum in both systole and diastole appeared consistent with right ventricular pressure overload. There was moderate left ventricular enlargement. There was moderate right atrial enlargement and right ventricular enlargement with decreased right ventricular systolic function. The tricuspid and pulmonic valves appeared normal, with moderate tricuspid and mild pulmonic regurgitation. The pericardium appeared normal, with trace pericardial effusion. Estimated right ventricular systolic pressure was 55 mm Hg, consistent with moderate pulmonary hypertension. Agitated saline injection resulted in a large amount of bubbles seen in the right heart. These findings were suggestive of a significant shunt. MARGIE was not done. Standing regimen: amiodarone  100 mg by mouth once daily, EC aspirin 81 mg daily, dobutamine infusion 7.5 mcg/kG/Min, albuterol/ipratropium for nebulization 3 mL nebulizer every 6 hours as needed, atorvastatin (Lipitor)  80 mg daily at bedtime, finasteride 5 mg daily and levothyroxine 75 mcg daily

## 2018-05-27 NOTE — ED ADULT NURSE NOTE - CHIEF COMPLAINT QUOTE
nose bleed intermittently since last pm. states decreased home o2 to 2l from 3l,states increased diff breathing, denies ch pains. arrives dobutamine infusion .   pt takes coumadin

## 2018-05-27 NOTE — CONSULT NOTE ADULT - ASSESSMENT
63M with HFrEF on home Dobutamine gtt X 3 years, current dose 7.5 mcg, afib on coumadin, ICD (Biotronik), ESRD on HD, COPD, and pulmonary fibrosis p/w with fluid overload and asymptomatic hypotension requiring urgent HD.    D/W Dr. Boland, cardio fellow: will transfer to CCU for HD, continue Dobutamine at present rate. Monitor BP may require pressor support for HD.

## 2018-05-27 NOTE — ED ADULT NURSE NOTE - OBJECTIVE STATEMENT
Patient presented to ED with c/o epistaxis, no active bleeding at this time.  Patient on constant nasal O2 at home for chronic SOB for IPF and coumadin. Patient has left arm PICC line with Dobutamine infusion.  PPM, ESRD on HD M-W-F, via right ACW permacath. Patient received extra dose of HD Saturday 05/26/18 for fluid overload.  Blood work drawn and sent to lab.  EKG performed, CM in place.  Family present at the bedside.  Will continue to monitor patient closely.  Gomez BECKER

## 2018-05-27 NOTE — ED PROVIDER NOTE - MEDICAL DECISION MAKING DETAILS
63m w hx severe CHF on chronic dobutamine gtt at home x3 years, afib on coumadin, AICD, ESRD on HD MWF, pulm fibrosis, here today for epistaxis and sob worse than usual. No evidence active epistaxis at this time, does not appear to be posterior bleed. Pt's hemodynamics are at his baseline. Will keep on monitor, check labs to r/o thrombocytopenia or suprather w coumadin, xr, reassess

## 2018-05-27 NOTE — H&P ADULT - HISTORY OF PRESENT ILLNESS
Patient seen and examined at the bedside. Patient transferred to general medical floor with significant dyspnea which manifests with minimal activity. Therefore, comprehensive HPI was not possible to elicit at this time. Briefly, 62 y/o M PMHx significant for severe CHF, on chronic dobutamine gtt at home x3 years (follows up with Dr. Davis), AFib on coumadin, AICD, ESRD on HD MWF (plus add'l session on Saturday prn), pulmonary fibrosis, presents to the ED with chief complaint of epistaxis that started yesterday. Patient reports intermittent nose bleed that responded to direct pressure. However, bleeding would soon recur afterwards. He also reports chronic SOB with minimal exertion, such as transferring himself between chairs and minimal walking. He says this has been bothering him "all the time." No CP, cough or fever. Patient had an additional round of HD yesterday.     In the ED, vitals were: Temp 97.5F, 70s-90s SBP, HR WNL, and tachypnea to RR 24. Patient given midodrine 10mg x 1. Patient seen and examined at the bedside. Patient transferred to general medical floor with significant dyspnea which manifests with minimal activity. Therefore, comprehensive HPI was not possible to elicit at this time. Briefly, 62 y/o M PMHx significant for severe CHF, on chronic dobutamine gtt at home x 3 years (follows up with Dr. Davis), AFib on coumadin, AICD, ESRD on HD MWF (plus add'l session on Saturday prn), COPD (on home O2), pulmonary fibrosis, presents to the ED with chief complaint of epistaxis that started yesterday. Patient reports intermittent nose bleed that responded to direct pressure. However, bleeding would soon recur afterwards. He also reports chronic SOB with minimal exertion, such as transferring himself between chairs and minimal walking. He says this has been bothering him "all the time." No CP, cough or fever. Patient had an additional round of HD yesterday.     In the ED, vitals were: Temp 97.5F, 70s-90s SBP, HR WNL, and tachypnea to RR 24. Patient given midodrine 10mg x 1.

## 2018-05-27 NOTE — H&P ADULT - ATTENDING COMMENTS
Agree with Housestaff Note Above, edits made where appropriate, case discussed with housestaff    Patient seen and examined. This is a 63M with NYHA Class IV CHF (EF 19-21%) and ESRD on HD being admitted for worsening LE edema, worsening dyspnea, and epistaxis. The epistaxis has resolved, but as per patient since his last discharge months ago he has noticed that he his legs have slowly become more swollen and his chronic dyspnea is getting worse. The patient normally gets HD 3x a week, with a 4th session just in case his volume status requires. The patient had HD, monday, wednesday, friday, and yesterday. Despite these 4 sessions the patient is not experiencing any relief.   VS and PE as above  Labs, imaging reviewed  A/P: 63M being admitted for acute on chronic CHF and hypervolemia in the setting of hypotension. For now, patient is clinically stable in the sense that he is mentating well, and that his breathing is improving with increased oxygen. However patient needed 10 mg PO Midodrine to augment his BP, and in order to optimize his hemodynamics, the patient may need supervised titration of his Dobutamine or vasopressors while removing extra fluid during his next dialysis session.   - CCU to be consulted for purposes of inquiring benefit of titration of Dobutamine, if he would benefit, the patient should have this done in a CCU, as Dobutamine is an inotropic agent that is arrhythmogenic. Follow up CCU recs  - Renal to be consulted for HD tomorrow. He may also need HD in a monitored setting such as MICU or CCU  - Rest of plan as above Agree with Housestaff Note Above, edits made where appropriate, case discussed with housestaff    Patient seen and examined. This is a 63M with NYHA Class IV CHF (EF 19-21%) and ESRD on HD being admitted for worsening LE edema, worsening dyspnea, and epistaxis. The epistaxis has resolved, but as per patient since his last discharge months ago he has noticed that he his legs have slowly become more swollen and his chronic dyspnea is getting worse. The patient normally gets HD 3x a week, with a 4th session just in case his volume status requires. The patient had HD, monday, wednesday, friday, and yesterday. Despite these 4 sessions the patient is not experiencing any relief.   VS and PE as above  Labs, imaging reviewed  A/P: 63M being admitted for acute on chronic CHF and hypervolemia in the setting of hypotension. For now, patient is clinically stable in the sense that he is mentating well, and that his breathing is improving with increased oxygen. However patient needed 10 mg PO Midodrine to augment his BP, and in order to optimize his hemodynamics, the patient may possibly need supervised titration of his Dobutamine or vasopressors while removing extra fluid during his next dialysis session.   - CCU to be consulted for purposes of inquiring benefit of titration of Dobutamine, if he would benefit, the patient should have this done in a CCU, as Dobutamine is an inotropic agent that is arrhythmogenic. Follow up CCU recs  - Renal to be consulted for HD tomorrow. He may also need HD in a monitored setting such as MICU or CCU  - Patient wishes to remain Full Code right now (see Advanced Personal Directive note). Patient should get palliative consultation for goals of care during this hospitalization -  - Rest of plan as above

## 2018-05-27 NOTE — CONSULT NOTE ADULT - SUBJECTIVE AND OBJECTIVE BOX
[facility] - Mark / Khai PADRON /Jennifer/ VITO García/ VITO Leigh/ Kolton Farr / LISSA Njeru  -------------------------------------------------------------------------------------------------------  The patient seen and examined today.  HPI:  hx of End Stage Renal Disease on Dialysis on Tuesday, Thursday and Saturday last hemodialysis done on yesterday, hx of congestive heart failure , afib on coumadin, copd on home oxygen admitted with epistaxis here with short of breath and hypotension  in er the noted to be 70-90s sytolic blood pressure with shortness of breath, ? pulm edema      PAST MEDICAL & SURGICAL HISTORY:  Seizure: Off Keppra since 7/2017  Chronic hypotension  AF (atrial fibrillation)  COPD (chronic obstructive pulmonary disease): 4L home O2  HLD (hyperlipidemia)  DM (diabetes mellitus): Off Insulin since 7/2017  ESRD (end stage renal disease) on dialysis  BPH (benign prostatic hypertrophy)  Myocardial infarction: 10/2011  Gout  CHF (congestive heart failure)  AICD (automatic cardioverter/defibrillator) present: [employer] - placed 9/11/09  H/O coronary angiogram    Allergies :- No Known Allergies    Home Medications Reviewed  Hospital Medications:   MEDICATIONS  (STANDING):  amiodarone    Tablet 100 milliGRAM(s) Oral daily  aspirin enteric coated 81 milliGRAM(s) Oral daily  atorvastatin 80 milliGRAM(s) Oral at bedtime  DOBUTamine Infusion 7.5 MICROgram(s)/kG/Min (16.875 mL/Hr) IV Continuous <Continuous>  finasteride 5 milliGRAM(s) Oral daily  levothyroxine 75 MICROGram(s) Oral daily  sevelamer hydrochloride 1600 milliGRAM(s) Oral three times a day    SOCIAL HISTORY:  Denies ETOh,Smoking,   FAMILY HISTORY:  No pertinent family history in first degree relatives    REVIEW OF SYSTEMS:  CONSTITUTIONAL: No weakness, fevers or chills  EYES/ENT: No visual changes;  No vertigo or throat pain , + nose bleed  NECK: No pain or stiffness  RESPIRATORY: No cough,; + shortness of breath  CARDIOVASCULAR: No chest pain or palpitations.  GASTROINTESTINAL: No abdominal or epigastric pain. No nausea, vomiting, or hematemesis; No diarrhea or constipation. No melena or hematochezia.  GENITOURINARY: No dysuria, frequency, foamy urine, urinary urgency, incontinence or hematuria  NEUROLOGICAL: No numbness or weakness  SKIN: No itching, burning, rashes, or lesions   VASCULAR: No bilateral lower extremity edema.   All other review of systems is negative unless indicated above.    VITALS:  T(F): 97.4 (05-27-18 @ 17:24), Max: 97.5 (05-27-18 @ 11:13)  HR: 66 (05-27-18 @ 17:24)  BP: 80/45 (05-27-18 @ 17:24)  RR: 18 (05-27-18 @ 17:24)  SpO2: 95% (05-27-18 @ 17:24)    Height (cm): 180.34 (05-27 @ 17:24)  Weight (kg): 71 (05-27 @ 17:24)  BMI (kg/m2): 21.8 (05-27 @ 17:24)  BSA (m2): 1.9 (05-27 @ 17:24)    PHYSICAL EXAM:  Constitutional: NAD  Neck: supple.   Respiratory: Bilateral equal breath sounds , bilat mid and lower lung field crackles  Cardiovascular: S1, S2, Regular, Murmur present.  Gastrointestinal: Bowel Sound present, soft, NT/ND  Extremities:+o peripheral edema  Neurological: Alert and oriented x 3, no focal deficits  Psychiatric: Normal mood, normal affect    Data:  05-27    133<L>  |  94<L>  |  43<H>  ----------------------------<  238<H>  4.1   |  26  |  4.88<H>    Ca    8.9      27 May 2018 11:48    TPro  8.1  /  Alb  3.3  /  TBili  0.4  /  DBili      /  AST  30  /  ALT  29  /  AlkPhos  215<H>  05-27    Creatinine Trend: 4.88 <--                        11.6   5.87  )-----------( 103      ( 27 May 2018 11:48 )             38.4     Urine Studies:

## 2018-05-27 NOTE — ED PROVIDER NOTE - ATTENDING CONTRIBUTION TO CARE
I performed a face to face bedside interview with patient regarding history of present illness, review of symptoms and past medical history. I completed an independent physical exam.  I have discussed patient's plan of care.   I agree with note as stated above, having amended the EMR as needed to reflect my findings. I have discussed the assessment and plan of care.  This includes during the time I functioned as the attending physician for this patient.  Attending Contribution to Care: agree with plan of resident. pt p/w complaint of epistaxis which has currently resolved also complaining of worsening SOB. sig hypoxia on RA. normal s02 is 80% on 2 liters and pt is relatively asymptomatic. pt bp at baseline. cxr found to have b/l pleural effusions. pt hd stable relative to baseline of pt. not requiring icu secondary to acute conditions. pt understands and is aaox3. family reported all results.

## 2018-05-28 DIAGNOSIS — N18.6 END STAGE RENAL DISEASE: ICD-10-CM

## 2018-05-28 DIAGNOSIS — Z78.9 OTHER SPECIFIED HEALTH STATUS: ICD-10-CM

## 2018-05-28 DIAGNOSIS — I95.9 HYPOTENSION, UNSPECIFIED: ICD-10-CM

## 2018-05-28 DIAGNOSIS — J84.10 PULMONARY FIBROSIS, UNSPECIFIED: ICD-10-CM

## 2018-05-28 LAB
ALBUMIN SERPL ELPH-MCNC: 3.3 G/DL — SIGNIFICANT CHANGE UP (ref 3.3–5)
ALP SERPL-CCNC: 211 U/L — HIGH (ref 40–120)
ALT FLD-CCNC: 28 U/L — SIGNIFICANT CHANGE UP (ref 4–41)
AST SERPL-CCNC: 29 U/L — SIGNIFICANT CHANGE UP (ref 4–40)
BASOPHILS # BLD AUTO: 0.03 K/UL — SIGNIFICANT CHANGE UP (ref 0–0.2)
BASOPHILS NFR BLD AUTO: 0.5 % — SIGNIFICANT CHANGE UP (ref 0–2)
BILIRUB SERPL-MCNC: 0.5 MG/DL — SIGNIFICANT CHANGE UP (ref 0.2–1.2)
BUN SERPL-MCNC: 50 MG/DL — HIGH (ref 7–23)
CALCIUM SERPL-MCNC: 9.1 MG/DL — SIGNIFICANT CHANGE UP (ref 8.4–10.5)
CHLORIDE SERPL-SCNC: 91 MMOL/L — LOW (ref 98–107)
CO2 SERPL-SCNC: 24 MMOL/L — SIGNIFICANT CHANGE UP (ref 22–31)
CREAT SERPL-MCNC: 5.62 MG/DL — HIGH (ref 0.5–1.3)
EOSINOPHIL # BLD AUTO: 0.19 K/UL — SIGNIFICANT CHANGE UP (ref 0–0.5)
EOSINOPHIL NFR BLD AUTO: 2.9 % — SIGNIFICANT CHANGE UP (ref 0–6)
GLUCOSE SERPL-MCNC: 133 MG/DL — HIGH (ref 70–99)
HBV CORE AB SER-ACNC: NONREACTIVE — SIGNIFICANT CHANGE UP
HBV SURFACE AB SER-ACNC: <3 MLU/ML — LOW
HBV SURFACE AG SER-ACNC: NEGATIVE — SIGNIFICANT CHANGE UP
HCT VFR BLD CALC: 38 % — LOW (ref 39–50)
HCV AB S/CO SERPL IA: 0.16 S/CO — SIGNIFICANT CHANGE UP
HCV AB SERPL-IMP: SIGNIFICANT CHANGE UP
HGB BLD-MCNC: 11.6 G/DL — LOW (ref 13–17)
IMM GRANULOCYTES # BLD AUTO: 0.01 # — SIGNIFICANT CHANGE UP
IMM GRANULOCYTES NFR BLD AUTO: 0.2 % — SIGNIFICANT CHANGE UP (ref 0–1.5)
INR BLD: 1.39 — HIGH (ref 0.88–1.17)
LYMPHOCYTES # BLD AUTO: 1.27 K/UL — SIGNIFICANT CHANGE UP (ref 1–3.3)
LYMPHOCYTES # BLD AUTO: 19.7 % — SIGNIFICANT CHANGE UP (ref 13–44)
MAGNESIUM SERPL-MCNC: 2.4 MG/DL — SIGNIFICANT CHANGE UP (ref 1.6–2.6)
MCHC RBC-ENTMCNC: 30.5 % — LOW (ref 32–36)
MCHC RBC-ENTMCNC: 30.7 PG — SIGNIFICANT CHANGE UP (ref 27–34)
MCV RBC AUTO: 100.5 FL — HIGH (ref 80–100)
MONOCYTES # BLD AUTO: 0.73 K/UL — SIGNIFICANT CHANGE UP (ref 0–0.9)
MONOCYTES NFR BLD AUTO: 11.3 % — SIGNIFICANT CHANGE UP (ref 2–14)
NEUTROPHILS # BLD AUTO: 4.22 K/UL — SIGNIFICANT CHANGE UP (ref 1.8–7.4)
NEUTROPHILS NFR BLD AUTO: 65.4 % — SIGNIFICANT CHANGE UP (ref 43–77)
NRBC # FLD: 0 — SIGNIFICANT CHANGE UP
PHOSPHATE SERPL-MCNC: 4 MG/DL — SIGNIFICANT CHANGE UP (ref 2.5–4.5)
PLATELET # BLD AUTO: 101 K/UL — LOW (ref 150–400)
PMV BLD: 13 FL — SIGNIFICANT CHANGE UP (ref 7–13)
POTASSIUM SERPL-MCNC: 4.4 MMOL/L — SIGNIFICANT CHANGE UP (ref 3.5–5.3)
POTASSIUM SERPL-SCNC: 4.4 MMOL/L — SIGNIFICANT CHANGE UP (ref 3.5–5.3)
PROT SERPL-MCNC: 8.2 G/DL — SIGNIFICANT CHANGE UP (ref 6–8.3)
PROTHROM AB SERPL-ACNC: 15.5 SEC — HIGH (ref 9.8–13.1)
RBC # BLD: 3.78 M/UL — LOW (ref 4.2–5.8)
RBC # FLD: 15.8 % — HIGH (ref 10.3–14.5)
SODIUM SERPL-SCNC: 131 MMOL/L — LOW (ref 135–145)
WBC # BLD: 6.45 K/UL — SIGNIFICANT CHANGE UP (ref 3.8–10.5)
WBC # FLD AUTO: 6.45 K/UL — SIGNIFICANT CHANGE UP (ref 3.8–10.5)

## 2018-05-28 PROCEDURE — 93306 TTE W/DOPPLER COMPLETE: CPT | Mod: 26

## 2018-05-28 RX ORDER — WARFARIN SODIUM 2.5 MG/1
3 TABLET ORAL ONCE
Qty: 0 | Refills: 0 | Status: COMPLETED | OUTPATIENT
Start: 2018-05-28 | End: 2018-05-28

## 2018-05-28 RX ORDER — NOREPINEPHRINE BITARTRATE/D5W 8 MG/250ML
0.01 PLASTIC BAG, INJECTION (ML) INTRAVENOUS
Qty: 8 | Refills: 0 | Status: DISCONTINUED | OUTPATIENT
Start: 2018-05-28 | End: 2018-05-28

## 2018-05-28 RX ORDER — SEVELAMER CARBONATE 2400 MG/1
1600 POWDER, FOR SUSPENSION ORAL
Qty: 0 | Refills: 0 | Status: DISCONTINUED | OUTPATIENT
Start: 2018-05-28 | End: 2018-06-09

## 2018-05-28 RX ADMIN — Medication 16.88 MICROGRAM(S)/KG/MIN: at 19:04

## 2018-05-28 RX ADMIN — Medication 75 MICROGRAM(S): at 05:56

## 2018-05-28 RX ADMIN — FINASTERIDE 5 MILLIGRAM(S): 5 TABLET, FILM COATED ORAL at 13:14

## 2018-05-28 RX ADMIN — CHLORHEXIDINE GLUCONATE 1 APPLICATION(S): 213 SOLUTION TOPICAL at 13:15

## 2018-05-28 RX ADMIN — SEVELAMER CARBONATE 1600 MILLIGRAM(S): 2400 POWDER, FOR SUSPENSION ORAL at 13:14

## 2018-05-28 RX ADMIN — AMIODARONE HYDROCHLORIDE 100 MILLIGRAM(S): 400 TABLET ORAL at 09:21

## 2018-05-28 RX ADMIN — Medication 81 MILLIGRAM(S): at 13:14

## 2018-05-28 RX ADMIN — ATORVASTATIN CALCIUM 80 MILLIGRAM(S): 80 TABLET, FILM COATED ORAL at 22:04

## 2018-05-28 RX ADMIN — SEVELAMER CARBONATE 1600 MILLIGRAM(S): 2400 POWDER, FOR SUSPENSION ORAL at 18:09

## 2018-05-28 RX ADMIN — WARFARIN SODIUM 3 MILLIGRAM(S): 2.5 TABLET ORAL at 18:09

## 2018-05-28 RX ADMIN — SEVELAMER CARBONATE 1600 MILLIGRAM(S): 2400 POWDER, FOR SUSPENSION ORAL at 05:56

## 2018-05-28 NOTE — CONSULT NOTE ADULT - ASSESSMENT
63 year old man with ESRD (HD MWF), NICM (EF 21%) s/p ICD, PICC line in place with home dobutamine drip, atrial fibrillation on coumadin, COPD on home O2 (4-5L), pulmonary fibrosis with increasing shortness of breath 63 year old man with ESRD (HD MWF), NICM (EF 21%) s/p ICD, PICC line in place with home dobutamine drip, atrial fibrillation on coumadin, COPD on home O2 (4-5L), pulmonary fibrosis with increasing shortness of breath and severe epistaxis.  Currently feeling better on increase in dobutamine now 7.5 ug/kg/min.  Obtain echo to evaluate LV function (known to be 21%)  Continue CCU care  Continue dobutamine.  Further treatment based on echo.

## 2018-05-28 NOTE — PROGRESS NOTE ADULT - PROBLEM SELECTOR PLAN 2
Patient with EF 15% on 5/28 TTE, down from 21% on 10/17 TTE. On dobutamine gtt as an outpatient. S/p midodrine in the ED, no need for pressors in CCU.  - C/w dobutamine 7.5 mcg/kg/min  - BP in lower extremities is significantly higher than in upper extremities, consistent w prior CCU admission; we think that LE BP is likely more accurate than UE BP.

## 2018-05-28 NOTE — PROGRESS NOTE ADULT - ASSESSMENT
63yM w severe HFrEF (on dobutamine gtt x3 yrs) w AICD, A-fib (on warfarin), ESRD on HD MWF (+Sat PRN), pulmonary fibrosis, on 5/27 p/w epistaxis x1 day, also w acute-on-chronic SOB, found to be volume-overloaded despite reported med compliance, no dietary changes, and having gotten extra session of HD the day prior to admission. SBP 80s-90s in UEs, so to be monitored while undergoing HD was transferred to CCU.

## 2018-05-28 NOTE — PROGRESS NOTE ADULT - PROBLEM SELECTOR PLAN 1
Due to volume overload, likely multifactorial 2/2 ESRD, CHF. Also could be c/b pulmonary fibrosis, ?COPD. Pt already s/p 4 doses of HD in wk prior to admission  - C/w supplemental O2 goal Sat 88-92%, CHF, Pulm fibrosis, COPD mgmt as below.

## 2018-05-28 NOTE — PROGRESS NOTE ADULT - PROBLEM SELECTOR PLAN 1
K ok, hypervolemic. inc hd time to 3.5hrs, inc uf goal to 1.6L as tolerated by bp  low Na 2/2 hypervolemia. chronic non adherance w/fluid restriction   renal diet , fluid restriction 1L/day

## 2018-05-28 NOTE — PROGRESS NOTE ADULT - SUBJECTIVE AND OBJECTIVE BOX
Note Incomplete  --- Pending Attending Rounds      Briefly: 63yM CHF (home  gtt x3y) w AICD, Afib (warfarin), ESRD (HD 3-4/wk), on  p/w epistaxis + SOB, BP soft so to CCU where got HD    Interval Events: HD overnight w net removal of 1.7L     Subjective:     Physical:  ICU Vital Signs Last 24 Hrs  T(C): 36.3 (28 May 2018 03:00), Max: 36.4 (27 May 2018 11:13)  T(F): 97.4 (28 May 2018 03:00), Max: 97.5 (27 May 2018 11:13)  HR: 74 (28 May 2018 06:00) (62 - 76)  BP: 88/53 (28 May 2018 06:00) (78/47 - 106/57)  BP(mean): 61 (28 May 2018 06:00) (60 - 69)  ABP: --  ABP(mean): --  RR: 19 (28 May 2018 06:00) (13 - 24)  SpO2: 96% (28 May 2018 06:00) (84% - 100%)      Telemetry: A-fib, PVCs, WCT x8 beats (23:32)    EKG:    Exam:      LABS:  CAPILLARY BLOOD GLUCOSE      POCT Blood Glucose.: 191 mg/dL (27 May 2018 17:17)    I&O's Summary    27 May 2018 07:01  -  28 May 2018 07:00  --------------------------------------------------------  IN: 502 mL / OUT: 2100 mL / NET: -1598 mL                              11.6   6.45  )-----------( 101      ( 28 May 2018 02:57 )             38.0     WBC Trend: 6.45<--, 5.87<--  05-28    131<L>  |  91<L>  |  50<H>  ----------------------------<  133<H>  4.4   |  24  |  5.62<H>    Ca    9.1      28 May 2018 02:57  Phos  4.0       Mg     2.4         TPro  8.2  /  Alb  3.3  /  TBili  0.5  /  DBili  x   /  AST  29  /  ALT  28  /  AlkPhos  211<H>      Creatinine Trend: 5.62<--, 4.88<--    PT/INR - ( 27 May 2018 11:48 )   PT: 15.3 SEC;   INR: 1.32       PTT - ( 27 May 2018 11:48 )  PTT:45.4 SEC          Imaging:        MEDICATIONS  (STANDING):  amiodarone    Tablet 100 milliGRAM(s) Oral daily  aspirin enteric coated 81 milliGRAM(s) Oral daily  atorvastatin 80 milliGRAM(s) Oral at bedtime  chlorhexidine 4% Liquid 1 Application(s) Topical <User Schedule>  DOBUTamine Infusion 7.5 MICROgram(s)/kG/Min (16.875 mL/Hr) IV Continuous <Continuous>  finasteride 5 milliGRAM(s) Oral daily  levothyroxine 75 MICROGram(s) Oral daily  sevelamer hydrochloride 1600 milliGRAM(s) Oral three times a day    MEDICATIONS  (PRN):  ALBUTerol/ipratropium for Nebulization 3 milliLiter(s) Nebulizer every 6 hours PRN Shortness of Breath and/or Wheezing  docusate sodium 100 milliGRAM(s) Oral two times a day PRN Constipation  senna 2 Tablet(s) Oral at bedtime PRN Constipation      Consult Recommendations: Note Incomplete  --- Pending Attending Rounds      Briefly: 63yM CHF (home  gtt x3y) w AICD, Afib (warfarin), ESRD (HD 3-4/wk), on  p/w epistaxis + SOB, BP soft so came to CCU to get HD    Interval Events: Ultrafiltration overnight w net removal of 1.7L     Subjective:   GEN no fevers, no chills  RESP dyspnea is worse than his chronic SOB, and worse w exertion. no cough, no wheeze  CV no chest pain, no palpitations    Physical:  ICU Vital Signs Last 24 Hrs  T(C): 36.3 (28 May 2018 03:00), Max: 36.4 (27 May 2018 11:13)  T(F): 97.4 (28 May 2018 03:00), Max: 97.5 (27 May 2018 11:13)  HR: 74 (28 May 2018 06:00) (62 - 76)  BP: 88/53 (28 May 2018 06:00) (78/47 - 106/57)  BP(mean): 61 (28 May 2018 06:00) (60 - 69)  RR: 19 (28 May 2018 06:00) (13 - 24)  SpO2: 96% (28 May 2018 06:00) (84% - 100%)      Telemetry: A-fib, PVCs, WCT x8 beats (23:32)    EKG :  A-fib w HR 67, one PVC, axis wnl, QRS narrow, QTc 491 per automated read  Extremity leads low voltage  No Q waves. Poor R-wave progression.   No ST elevations or depressions. T-wave flattening and/or inversions in I, aVR, aVL, V6.    Exam:  GEN no acute distress, non-toxic-appearing, on NC 5L/min  NECK JVD ~13cm (8cm above sternum + 5cm above atrium)  RESP breath sounds diminished at bases R > L, no wheeze  ABD soft nt nd  EXT feet warm b/l    LABS:  CAPILLARY BLOOD GLUCOSE      POCT Blood Glucose.: 191 mg/dL (27 May 2018 17:17)    I&O's Summary    27 May 2018 07:01  -  28 May 2018 07:00  --------------------------------------------------------  IN: 502 mL / OUT: 2100 mL / NET: -1598 mL                              11.6   6.45  )-----------( 101      ( 28 May 2018 02:57 )             38.0     WBC Trend: 6.45<--, 5.87<--      131<L>  |  91<L>  |  50<H>  ----------------------------<  133<H>  4.4   |  24  |  5.62<H>    Ca    9.1      28 May 2018 02:57  Phos  4.0       Mg     2.4         TPro  8.2  /  Alb  3.3  /  TBili  0.5  /  DBili  x   /  AST  29  /  ALT  28  /  AlkPhos  211<H>      Creatinine Trend: 5.62<--, 4.88<--    PT/INR - ( 27 May 2018 11:48 )   PT: 15.3 SEC;   INR: 1.32       PTT - ( 27 May 2018 11:48 )  PTT:45.4 SEC          Imaging:  IMPRESSION:   Pulmonary edema. Right pleural effusion.      MEDICATIONS  (STANDING):  amiodarone    Tablet 100 milliGRAM(s) Oral daily  aspirin enteric coated 81 milliGRAM(s) Oral daily  atorvastatin 80 milliGRAM(s) Oral at bedtime  chlorhexidine 4% Liquid 1 Application(s) Topical <User Schedule>  DOBUTamine Infusion 7.5 MICROgram(s)/kG/Min (16.875 mL/Hr) IV Continuous <Continuous>  finasteride 5 milliGRAM(s) Oral daily  levothyroxine 75 MICROGram(s) Oral daily  sevelamer hydrochloride 1600 milliGRAM(s) Oral three times a day    MEDICATIONS  (PRN):  ALBUTerol/ipratropium for Nebulization 3 milliLiter(s) Nebulizer every 6 hours PRN Shortness of Breath and/or Wheezing  docusate sodium 100 milliGRAM(s) Oral two times a day PRN Constipation  senna 2 Tablet(s) Oral at bedtime PRN Constipation      Consult Recommendations: Note Complete    Briefly: 63yM CHF (home  gtt x3y) w AICD, Afib (warfarin), ESRD (HD 3-4/wk), on  p/w epistaxis + SOB, BP soft so came to CCU to get HD    Interval Events: Ultrafiltration overnight w net removal of 1.7L     Subjective:   GEN no fevers, no chills  RESP dyspnea is worse than his chronic SOB, and worse w exertion. no cough, no wheeze  CV no chest pain, no palpitations    Physical:  ICU Vital Signs Last 24 Hrs  T(C): 36.3 (28 May 2018 03:00), Max: 36.4 (27 May 2018 11:13)  T(F): 97.4 (28 May 2018 03:00), Max: 97.5 (27 May 2018 11:13)  HR: 74 (28 May 2018 06:00) (62 - 76)  BP: 88/53 (28 May 2018 06:00) (78/47 - 106/57)  BP(mean): 61 (28 May 2018 06:00) (60 - 69)  RR: 19 (28 May 2018 06:00) (13 - 24)  SpO2: 96% (28 May 2018 06:00) (84% - 100%)      Telemetry: A-fib, PVCs, WCT x8 beats (23:32)    EKG :  A-fib w HR 67, one PVC, axis wnl, QRS narrow, QTc 491 per automated read  Extremity leads low voltage  No Q waves. Poor R-wave progression.   No ST elevations or depressions. T-wave flattening and/or inversions in I, aVR, aVL, V6.    Exam:  GEN no acute distress, non-toxic-appearing, on NC 5L/min  NECK JVD ~13cm (8cm above sternum + 5cm above atrium)  RESP breath sounds diminished at bases R > L, no wheeze  ABD soft nt nd  EXT feet warm b/l    LABS:  CAPILLARY BLOOD GLUCOSE      POCT Blood Glucose.: 191 mg/dL (27 May 2018 17:17)    I&O's Summary    27 May 2018 07:01  -  28 May 2018 07:00  --------------------------------------------------------  IN: 502 mL / OUT: 2100 mL / NET: -1598 mL                              11.6   6.45  )-----------( 101      ( 28 May 2018 02:57 )             38.0     WBC Trend: 6.45<--, 5.87<--      131<L>  |  91<L>  |  50<H>  ----------------------------<  133<H>  4.4   |  24  |  5.62<H>    Ca    9.1      28 May 2018 02:57  Phos  4.0       Mg     2.4         TPro  8.2  /  Alb  3.3  /  TBili  0.5  /  DBili  x   /  AST  29  /  ALT  28  /  AlkPhos  211<H>      Creatinine Trend: 5.62<--, 4.88<--    PT/INR - ( 27 May 2018 11:48 )   PT: 15.3 SEC;   INR: 1.32       PTT - ( 27 May 2018 11:48 )  PTT:45.4 SEC          Imaging:  "CXR    IMPRESSION:   Pulmonary edema. Right pleural effusion."      MEDICATIONS  (STANDING):  amiodarone    Tablet 100 milliGRAM(s) Oral daily  aspirin enteric coated 81 milliGRAM(s) Oral daily  atorvastatin 80 milliGRAM(s) Oral at bedtime  chlorhexidine 4% Liquid 1 Application(s) Topical <User Schedule>  DOBUTamine Infusion 7.5 MICROgram(s)/kG/Min (16.875 mL/Hr) IV Continuous <Continuous>  finasteride 5 milliGRAM(s) Oral daily  levothyroxine 75 MICROGram(s) Oral daily  sevelamer hydrochloride 1600 milliGRAM(s) Oral three times a day    MEDICATIONS  (PRN):  ALBUTerol/ipratropium for Nebulization 3 milliLiter(s) Nebulizer every 6 hours PRN Shortness of Breath and/or Wheezing  docusate sodium 100 milliGRAM(s) Oral two times a day PRN Constipation  senna 2 Tablet(s) Oral at bedtime PRN Constipation      Consult Recommendations:  - Nephrology :   --- HD  Note Complete    Briefly: 63yM CHF (home  gtt x3y) w AICD, Afib (warfarin), ESRD (HD 3-4/wk), on  p/w epistaxis + SOB, BP soft so came to CCU to get HD    Interval Events: Ultrafiltration overnight w net removal of 1.7L     Subjective:   GEN no fevers, no chills  RESP dyspnea is worse than his chronic SOB, and worse w exertion. no cough, no wheeze  CV no chest pain, no palpitations    Physical:  ICU Vital Signs Last 24 Hrs  T(C): 36.3 (28 May 2018 03:00), Max: 36.4 (27 May 2018 11:13)  T(F): 97.4 (28 May 2018 03:00), Max: 97.5 (27 May 2018 11:13)  HR: 74 (28 May 2018 06:00) (62 - 76)  BP: 88/53 (28 May 2018 06:00) (78/47 - 106/57)  BP(mean): 61 (28 May 2018 06:00) (60 - 69)  RR: 19 (28 May 2018 06:00) (13 - 24)  SpO2: 96% (28 May 2018 06:00) (84% - 100%)      Telemetry: A-fib, PVCs, WCT x8 beats (23:32)    EKG :  A-fib w HR 67, one PVC, axis wnl, QRS narrow, QTc 491 per automated read  Extremity leads low voltage  No Q waves. Poor R-wave progression.   No ST elevations or depressions. T-wave flattening and/or inversions in I, aVR, aVL, V6.    Exam:  GEN no acute distress, non-toxic-appearing, on NC 5L/min  NECK JVD ~13cm (8cm above sternum + 5cm above atrium)  CHEST R chest w Perma-Cath  RESP breath sounds diminished at bases R > L, no wheeze  ABD soft nt nd  EXT LUE PICC or mid-line, feet warm b/l    LABS:  CAPILLARY BLOOD GLUCOSE      POCT Blood Glucose.: 191 mg/dL (27 May 2018 17:17)    I&O's Summary    27 May 2018 07:01  -  28 May 2018 07:00  --------------------------------------------------------  IN: 502 mL / OUT: 2100 mL / NET: -1598 mL                              11.6   6.45  )-----------( 101      ( 28 May 2018 02:57 )             38.0     WBC Trend: 6.45<--, 5.87<--      131<L>  |  91<L>  |  50<H>  ----------------------------<  133<H>  4.4   |  24  |  5.62<H>    Ca    9.1      28 May 2018 02:57  Phos  4.0       Mg     2.4         TPro  8.2  /  Alb  3.3  /  TBili  0.5  /  DBili  x   /  AST  29  /  ALT  28  /  AlkPhos  211<H>      Creatinine Trend: 5.62<--, 4.88<--    PT/INR - ( 27 May 2018 11:48 )   PT: 15.3 SEC;   INR: 1.32       PTT - ( 27 May 2018 11:48 )  PTT:45.4 SEC    Hepatitis B Surface AB Quantitative (18 @ 01:00)    Hepatitis B Surface AB Quantitative: <3.0:   HEPBSAB Quantitative Interpretation:  > 12.0         mIU/mL     Immune  8.0-12.0      mIU/mL     Borderline  < 8.0           mIU/mL     Non-Immune mlU/mL        Imaging:  "CXR    IMPRESSION:   Pulmonary edema. Right pleural effusion."      MEDICATIONS  (STANDING):  amiodarone    Tablet 100 milliGRAM(s) Oral daily  aspirin enteric coated 81 milliGRAM(s) Oral daily  atorvastatin 80 milliGRAM(s) Oral at bedtime  chlorhexidine 4% Liquid 1 Application(s) Topical <User Schedule>  DOBUTamine Infusion 7.5 MICROgram(s)/kG/Min (16.875 mL/Hr) IV Continuous <Continuous>  finasteride 5 milliGRAM(s) Oral daily  levothyroxine 75 MICROGram(s) Oral daily  sevelamer hydrochloride 1600 milliGRAM(s) Oral three times a day    MEDICATIONS  (PRN):  ALBUTerol/ipratropium for Nebulization 3 milliLiter(s) Nebulizer every 6 hours PRN Shortness of Breath and/or Wheezing  docusate sodium 100 milliGRAM(s) Oral two times a day PRN Constipation  senna 2 Tablet(s) Oral at bedtime PRN Constipation      Consult Recommendations:  - Nephrology :   --- HD

## 2018-05-28 NOTE — PROGRESS NOTE ADULT - ASSESSMENT
·	End Stage Renal Disease on Dialysis + congestive heart failure with hypotension on dobutamine gtt with signs of fluid overload,  ·	anemia hb stable  ·	congestive heart failure on dobutamine drip, follow up with      Problem/Recommendation - 1:  Problem: ESRD (end stage renal disease) on dialysis. Recommendation: Volume Status : high, shortness of breath with hypotension  needs Ultrafiltration on Dialysis today,   get 2 hr Ultrafiltration on Dialysis , fluid removal goal 2 kg as tolerated with blood pressure   will need iv pressor to remove fluid,  the pt being transfer to ccu, will need bedside hemodialysis  consent for hemodialysis obtained and left in chart- witness by rn on 7th floor.     Problem/Recommendation - 2:  ·  Problem: CHF (congestive heart failure).  Recommendation: follow up with cardiology.

## 2018-05-28 NOTE — PROGRESS NOTE ADULT - SUBJECTIVE AND OBJECTIVE BOX
[facility] - Mark / Khai PADRON /Jennifer/ VITO García/ VITO Leigh/ Kolton Farr / LISSA Njeru  ---------------------------------------------------------------------------------------------------------------    Patient seen and examined bedside, in CCU, on HD    Subjective and Objective: No overnight events, sob better. No new complaints today.   had extra PUF 5/27 night into 5/28AM, net uf 1.7L removed, tolerated well  bp prehd low, now better sbp 109    Allergies: No Known Allergies      Hospital Medications:   MEDICATIONS  (STANDING):  amiodarone    Tablet 100 milliGRAM(s) Oral daily  aspirin enteric coated 81 milliGRAM(s) Oral daily  atorvastatin 80 milliGRAM(s) Oral at bedtime  chlorhexidine 4% Liquid 1 Application(s) Topical <User Schedule>  DOBUTamine Infusion 7.5 MICROgram(s)/kG/Min (16.875 mL/Hr) IV Continuous <Continuous>  finasteride 5 milliGRAM(s) Oral daily  levothyroxine 75 MICROGram(s) Oral daily  sevelamer hydrochloride 1600 milliGRAM(s) Oral <User Schedule>  warfarin 3 milliGRAM(s) Oral once      VITALS:  T(F): 96.5 (05-28-18 @ 14:41), Max: 97.4 (05-27-18 @ 17:24)  HR: 76 (05-28-18 @ 13:00)  BP: 109/71 (05-28-18 @ 13:00)  RR: 20 (05-28-18 @ 13:00)  SpO2: 94% (05-28-18 @ 13:00)  Wt(kg): --    05-27 @ 07:01  -  05-28 @ 07:00  --------------------------------------------------------  IN: 502 mL / OUT: 2100 mL / NET: -1598 mL    05-28 @ 07:01  -  05-28 @ 14:41  --------------------------------------------------------  IN: 500 mL / OUT: 1550 mL / NET: -1050 mL      Height (cm): 180.34 (05-27 @ 17:24)  Weight (kg): 71 (05-27 @ 17:24)  BMI (kg/m2): 21.8 (05-27 @ 17:24)  BSA (m2): 1.9 (05-27 @ 17:24)    PHYSICAL EXAM:  Constitutional: NAD  HEENT: anicteric sclera, oropharynx clear  Neck: No JVD  Respiratory: dec bibasilar BS, no wheezes, rales or rhonchi  Cardiovascular: S1, S2, RRR  Gastrointestinal: BS+, soft, NT/ND  Extremities: No cyanosis or clubbing. +peripheral edema  Neurological: A/O x 3, no focal deficits  Psychiatric: Normal mood, normal affect  : No CVA tenderness. No rosa.   Skin: No rashes  Vascular Access: IJ PC    LABS:  05-28    131<L>  |  91<L>  |  50<H>  ----------------------------<  133<H>  4.4   |  24  |  5.62<H>    Ca    9.1      28 May 2018 02:57  Phos  4.0     05-28  Mg     2.4     05-28    TPro  8.2  /  Alb  3.3  /  TBili  0.5  /  DBili      /  AST  29  /  ALT  28  /  AlkPhos  211<H>  05-28    Creatinine Trend: 5.62 <--, 4.88 <--                        11.6   6.45  )-----------( 101      ( 28 May 2018 02:57 )             38.0     Urine Studies:    RADIOLOGY & ADDITIONAL STUDIES:  < from: Xray Chest 1 View- PORTABLE-Urgent (05.27.18 @ 12:13) >    IMPRESSION:   Pulmonary edema. Right pleural effusion.

## 2018-05-28 NOTE — PROGRESS NOTE ADULT - PROBLEM SELECTOR PLAN 8
Patient off insulin since last year.   - C/w SSI Patient off insulin since last year.   - monitor off insulin

## 2018-05-28 NOTE — PROGRESS NOTE ADULT - PROBLEM SELECTOR PLAN 9
- DVT: HSQ  - Hypothyroidism: C/w Synthroid  - Cardiovascular: C/w ASA  - BPH: C/w finasteride  - GOC: pt emphatic that he wants to live as long as possible and recalls having spoken to palliative care many times in past, but not overly resistant to speaking w them again - consider HBV vaccination during this admission

## 2018-05-28 NOTE — CONSULT NOTE ADULT - SUBJECTIVE AND OBJECTIVE BOX
CHIEF COMPLAINT: Shortness of breath    HPI:  Patient is a 63 year old man seen and examined at the bedside. Patient transferred from general medical floor with significant dyspnea which manifests with minimal activity. Briefly, 64 y/o M PMHx significant for severe CHF, NICM (EF21%), on chronic dobutamine gtt at home x 3 years (follows up with our office, Dr. Davis), AFib on coumadin, AICD, ESRD on HD MWF (plus add'l session on Saturday prn), COPD (on home O2), pulmonary fibrosis, presents to the ED with chief complaint of epistaxis that started 5/26. Patient reports intermittent nose bleed that responded to direct pressure. However, bleeding would soon recur afterwards. He also reports chronic SOB with minimal exertion, such as transferring himself between chairs and minimal walking. He says this has been bothering him "all the time." No CP, cough or fever. Patient had an additional round of HD on 5/26.  Patient was SOB yesterday so transferred to CCU for increasing of dobutamine.     In the ED, vitals were: Temp 97.5F, 70s-90s SBP, HR WNL, and tachypnea to RR 24. Patient given midodrine 10mg x 1. (27 May 2018 17:23)      PAST MEDICAL & SURGICAL HISTORY:  Seizure: Off Keppra since 7/2017  Chronic hypotension  AF (atrial fibrillation)  COPD (chronic obstructive pulmonary disease): 4L home O2  HLD (hyperlipidemia)  DM (diabetes mellitus): Off Insulin since 7/2017  ESRD (end stage renal disease) on dialysis  BPH (benign prostatic hypertrophy)  Myocardial infarction: 10/2011  Gout  CHF (congestive heart failure)  AICD (automatic cardioverter/defibrillator) present: Biotronic - placed 9/11/09  H/O coronary angiogram  Influenza B in Feb 2018      Allergies    No Known Allergies    Intolerances        SOCIAL HISTORY    Smoking Hx: None  ETOH Hx: None  Marital Status: , lives at home with wife  Occupational Hx: Retired    FAMILY HISTORY:  No pertinent family history in first degree relatives      MEDICATIONS:  ALBUTerol/ipratropium for Nebulization 3 milliLiter(s) Nebulizer every 6 hours PRN  amiodarone    Tablet 100 milliGRAM(s) Oral daily  aspirin enteric coated 81 milliGRAM(s) Oral daily  atorvastatin 80 milliGRAM(s) Oral at bedtime  chlorhexidine 4% Liquid 1 Application(s) Topical <User Schedule>  DOBUTamine Infusion 7.5 MICROgram(s)/kG/Min IV Continuous <Continuous>  docusate sodium 100 milliGRAM(s) Oral two times a day PRN  finasteride 5 milliGRAM(s) Oral daily  levothyroxine 75 MICROGram(s) Oral daily  senna 2 Tablet(s) Oral at bedtime PRN  sevelamer hydrochloride 1600 milliGRAM(s) Oral three times a day      REVIEW OF SYSTEMS:    CONSTITUTIONAL: No weakness, fevers or chills  EYES/ENT: No visual changes;  No vertigo or throat pain   NECK: No pain or stiffness  RESPIRATORY: No cough, wheezing, hemoptysis; No shortness of breath  CARDIOVASCULAR: No chest pain or palpitations  GASTROINTESTINAL: No abdominal or epigastric pain. No nausea, vomiting, or hematemesis; No diarrhea or constipation. No melena or hematochezia.  GENITOURINARY: No dysuria, frequency or hematuria  NEUROLOGICAL: No numbness or weakness  SKIN: No itching, burning, rashes, or lesions   All other review of systems is negative unless indicated above    Vital Signs Last 24 Hrs  T(C): 36.3 (28 May 2018 03:00), Max: 36.4 (27 May 2018 11:13)  T(F): 97.4 (28 May 2018 03:00), Max: 97.5 (27 May 2018 11:13)  HR: 74 (28 May 2018 06:00) (62 - 76)  BP: 88/53 (28 May 2018 06:00) (78/47 - 106/57)  BP(mean): 61 (28 May 2018 06:00) (60 - 69)  RR: 19 (28 May 2018 06:00) (13 - 24)  SpO2: 96% (28 May 2018 06:00) (84% - 100%)    I&O's Summary    27 May 2018 07:01  -  28 May 2018 07:00  --------------------------------------------------------  IN: 502 mL / OUT: 2100 mL / NET: -1598 mL        PHYSICAL EXAM:    Constitutional: NAD, awake and alert, well-developed  HEENT: PERR, EOMI  Neck: soft and supple, No LAD, No JVD  Respiratory: Breath sounds are clear bilaterally, No wheezing, rales or rhonchi  Cardiovascular: Regular rate and rhythm, normal S1 and S2,  no murmurs, gallops or rubs  Gastrointestinal: Bowel Sounds present, soft, nontender.   Extremities: No peripheral edema. No clubbing or cyanosis.  Vascular: 2+ peripheral pulses  Neurological: A/O x 3, no focal deficits  Musculoskeletal: no calf tenderness.  Skin: No rashes.      LABS: All Labs Reviewed:                        11.6   6.45  )-----------( 101      ( 28 May 2018 02:57 )             38.0                         11.6   5.87  )-----------( 103      ( 27 May 2018 11:48 )             38.4     28 May 2018 02:57    131    |  91     |  50     ----------------------------<  133    4.4     |  24     |  5.62   27 May 2018 11:48    133    |  94     |  43     ----------------------------<  238    4.1     |  26     |  4.88     Ca    9.1        28 May 2018 02:57  Ca    8.9        27 May 2018 11:48  Phos  4.0       28 May 2018 02:57  Mg     2.4       28 May 2018 02:57    TPro  8.2    /  Alb  3.3    /  TBili  0.5    /  DBili  x      /  AST  29     /  ALT  28     /  AlkPhos  211    28 May 2018 02:57  TPro  8.1    /  Alb  3.3    /  TBili  0.4    /  DBili  x      /  AST  30     /  ALT  29     /  AlkPhos  215    27 May 2018 11:48    PT/INR - ( 27 May 2018 11:48 )   PT: 15.3 SEC;   INR: 1.32          PTT - ( 27 May 2018 11:48 )  PTT:45.4 SEC          RADIOLOGY/EKG: CHIEF COMPLAINT: Shortness of breath    HPI:  Patient is a 63 year old man seen and examined at the bedside. Patient transferred from general medical floor with significant dyspnea which manifests with minimal activity. Briefly, 64 y/o M PMHx significant for severe CHF, NICM (EF21%), on chronic dobutamine gtt at home x 3 years (follows up with our office, Dr. Davis), AFib on coumadin, AICD, ESRD on HD MWF (plus add'l session on Saturday prn), COPD (on home O2), pulmonary fibrosis, presents to the ED with chief complaint of epistaxis that started 5/26. Patient reports intermittent nose bleed that responded to direct pressure. However, bleeding would soon recur afterwards. He also reports chronic SOB with minimal exertion, such as transferring himself between chairs and minimal walking. He says this has been bothering him "all the time." No CP, cough or fever. Patient had an additional round of HD on 5/26.  Patient was SOB yesterday so transferred to CCU for increasing of dobutamine.     In the ED, vitals were: Temp 97.5F, 70s-90s SBP, HR WNL, and tachypnea to RR 24. Patient given midodrine 10mg x 1. (27 May 2018 17:23)      PAST MEDICAL & SURGICAL HISTORY:  Seizure: Off Keppra since 7/2017  Chronic hypotension  AF (atrial fibrillation)  COPD (chronic obstructive pulmonary disease): 4L home O2  HLD (hyperlipidemia)  DM (diabetes mellitus): Off Insulin since 7/2017  ESRD (end stage renal disease) on dialysis  BPH (benign prostatic hypertrophy)  Myocardial infarction: 10/2011  Gout  CHF (congestive heart failure)  AICD (automatic cardioverter/defibrillator) present: Biotronic - placed 9/11/09  H/O coronary angiogram  Influenza B in Feb 2018      Allergies    No Known Allergies    Intolerances        SOCIAL HISTORY    Smoking Hx: None  ETOH Hx: None  Marital Status: , lives at home with wife  Occupational Hx: Retired    FAMILY HISTORY:  No pertinent family history in first degree relatives      MEDICATIONS:  ALBUTerol/ipratropium for Nebulization 3 milliLiter(s) Nebulizer every 6 hours PRN  amiodarone    Tablet 100 milliGRAM(s) Oral daily  aspirin enteric coated 81 milliGRAM(s) Oral daily  atorvastatin 80 milliGRAM(s) Oral at bedtime  chlorhexidine 4% Liquid 1 Application(s) Topical <User Schedule>  DOBUTamine Infusion 7.5 MICROgram(s)/kG/Min IV Continuous <Continuous>  docusate sodium 100 milliGRAM(s) Oral two times a day PRN  finasteride 5 milliGRAM(s) Oral daily  levothyroxine 75 MICROGram(s) Oral daily  senna 2 Tablet(s) Oral at bedtime PRN  sevelamer hydrochloride 1600 milliGRAM(s) Oral three times a day      REVIEW OF SYSTEMS:    CONSTITUTIONAL: No weakness, fevers or chills  EYES/ENT: No visual changes;  No vertigo or throat pain   NECK: No pain or stiffness  RESPIRATORY: No cough, wheezing, hemoptysis; No shortness of breath  CARDIOVASCULAR: No chest pain or palpitations  GASTROINTESTINAL: No abdominal or epigastric pain. No nausea, vomiting, or hematemesis; No diarrhea or constipation. No melena or hematochezia.  GENITOURINARY: No dysuria, frequency or hematuria  NEUROLOGICAL: No numbness or weakness  SKIN: No itching, burning, rashes, or lesions   All other review of systems is negative unless indicated above    Vital Signs Last 24 Hrs  T(C): 36.3 (28 May 2018 03:00), Max: 36.4 (27 May 2018 11:13)  T(F): 97.4 (28 May 2018 03:00), Max: 97.5 (27 May 2018 11:13)  HR: 74 (28 May 2018 06:00) (62 - 76)  BP: 88/53 (28 May 2018 06:00) (78/47 - 106/57)  BP(mean): 61 (28 May 2018 06:00) (60 - 69)  RR: 19 (28 May 2018 06:00) (13 - 24)  SpO2: 96% (28 May 2018 06:00) (84% - 100%)    I&O's Summary    27 May 2018 07:01  -  28 May 2018 07:00  --------------------------------------------------------  IN: 502 mL / OUT: 2100 mL / NET: -1598 mL        PHYSICAL EXAM:    Constitutional: NAD, awake and alert, well-developed  HEENT: PERR, EOMI  Neck: soft and supple, No LAD, No JVD  Respiratory: Decreased breath sounds at bases  Cardiovascular: Regular rate and rhythm, normal S1 and S2,  no murmurs, gallops or rubs  Gastrointestinal: Bowel Sounds present, soft, nontender.   Extremities: No peripheral edema. No clubbing or cyanosis.  Vascular: 2+ peripheral pulses  Neurological: A/O x 3, no focal deficits  Musculoskeletal: no calf tenderness.  Skin: No rashes.      LABS: All Labs Reviewed:                        11.6   6.45  )-----------( 101      ( 28 May 2018 02:57 )             38.0                         11.6   5.87  )-----------( 103      ( 27 May 2018 11:48 )             38.4     28 May 2018 02:57    131    |  91     |  50     ----------------------------<  133    4.4     |  24     |  5.62   27 May 2018 11:48    133    |  94     |  43     ----------------------------<  238    4.1     |  26     |  4.88     Ca    9.1        28 May 2018 02:57  Ca    8.9        27 May 2018 11:48  Phos  4.0       28 May 2018 02:57  Mg     2.4       28 May 2018 02:57    TPro  8.2    /  Alb  3.3    /  TBili  0.5    /  DBili  x      /  AST  29     /  ALT  28     /  AlkPhos  211    28 May 2018 02:57  TPro  8.1    /  Alb  3.3    /  TBili  0.4    /  DBili  x      /  AST  30     /  ALT  29     /  AlkPhos  215    27 May 2018 11:48    PT/INR - ( 27 May 2018 11:48 )   PT: 15.3 SEC;   INR: 1.32          PTT - ( 27 May 2018 11:48 )  PTT:45.4 SEC          RADIOLOGY/EKG: Atrial fibrillation with moderate ventricular response

## 2018-05-29 DIAGNOSIS — Z51.5 ENCOUNTER FOR PALLIATIVE CARE: ICD-10-CM

## 2018-05-29 DIAGNOSIS — R04.0 EPISTAXIS: ICD-10-CM

## 2018-05-29 DIAGNOSIS — R06.02 SHORTNESS OF BREATH: ICD-10-CM

## 2018-05-29 DIAGNOSIS — R53.81 OTHER MALAISE: ICD-10-CM

## 2018-05-29 DIAGNOSIS — R57.0 CARDIOGENIC SHOCK: ICD-10-CM

## 2018-05-29 DIAGNOSIS — I50.23 ACUTE ON CHRONIC SYSTOLIC (CONGESTIVE) HEART FAILURE: ICD-10-CM

## 2018-05-29 LAB
BUN SERPL-MCNC: 34 MG/DL — HIGH (ref 7–23)
CALCIUM SERPL-MCNC: 8.7 MG/DL — SIGNIFICANT CHANGE UP (ref 8.4–10.5)
CHLORIDE SERPL-SCNC: 93 MMOL/L — LOW (ref 98–107)
CO2 SERPL-SCNC: 27 MMOL/L — SIGNIFICANT CHANGE UP (ref 22–31)
CREAT SERPL-MCNC: 4.51 MG/DL — HIGH (ref 0.5–1.3)
GLUCOSE SERPL-MCNC: 130 MG/DL — HIGH (ref 70–99)
HCT VFR BLD CALC: 37.5 % — LOW (ref 39–50)
HGB BLD-MCNC: 11.3 G/DL — LOW (ref 13–17)
INR BLD: 1.45 — HIGH (ref 0.88–1.17)
MAGNESIUM SERPL-MCNC: 2.1 MG/DL — SIGNIFICANT CHANGE UP (ref 1.6–2.6)
MCHC RBC-ENTMCNC: 30.1 % — LOW (ref 32–36)
MCHC RBC-ENTMCNC: 30.5 PG — SIGNIFICANT CHANGE UP (ref 27–34)
MCV RBC AUTO: 101.1 FL — HIGH (ref 80–100)
NRBC # FLD: 0 — SIGNIFICANT CHANGE UP
PHOSPHATE SERPL-MCNC: 3.1 MG/DL — SIGNIFICANT CHANGE UP (ref 2.5–4.5)
PLATELET # BLD AUTO: 97 K/UL — LOW (ref 150–400)
PMV BLD: 13.2 FL — HIGH (ref 7–13)
POTASSIUM SERPL-MCNC: 4.3 MMOL/L — SIGNIFICANT CHANGE UP (ref 3.5–5.3)
POTASSIUM SERPL-SCNC: 4.3 MMOL/L — SIGNIFICANT CHANGE UP (ref 3.5–5.3)
PROTHROM AB SERPL-ACNC: 16.2 SEC — HIGH (ref 9.8–13.1)
RBC # BLD: 3.71 M/UL — LOW (ref 4.2–5.8)
RBC # FLD: 15.5 % — HIGH (ref 10.3–14.5)
SODIUM SERPL-SCNC: 133 MMOL/L — LOW (ref 135–145)
WBC # BLD: 6.79 K/UL — SIGNIFICANT CHANGE UP (ref 3.8–10.5)
WBC # FLD AUTO: 6.79 K/UL — SIGNIFICANT CHANGE UP (ref 3.8–10.5)

## 2018-05-29 PROCEDURE — 93282 PRGRMG EVAL IMPLANTABLE DFB: CPT | Mod: 26

## 2018-05-29 PROCEDURE — 99223 1ST HOSP IP/OBS HIGH 75: CPT | Mod: GC

## 2018-05-29 PROCEDURE — 99223 1ST HOSP IP/OBS HIGH 75: CPT

## 2018-05-29 RX ORDER — MIDODRINE HYDROCHLORIDE 2.5 MG/1
30 TABLET ORAL THREE TIMES A DAY
Qty: 0 | Refills: 0 | Status: DISCONTINUED | OUTPATIENT
Start: 2018-05-29 | End: 2018-05-29

## 2018-05-29 RX ORDER — SENNA PLUS 8.6 MG/1
2 TABLET ORAL AT BEDTIME
Qty: 0 | Refills: 0 | Status: DISCONTINUED | OUTPATIENT
Start: 2018-05-29 | End: 2018-06-09

## 2018-05-29 RX ORDER — MIDODRINE HYDROCHLORIDE 2.5 MG/1
10 TABLET ORAL ONCE
Qty: 0 | Refills: 0 | Status: COMPLETED | OUTPATIENT
Start: 2018-05-29 | End: 2018-05-29

## 2018-05-29 RX ORDER — MIDODRINE HYDROCHLORIDE 2.5 MG/1
10 TABLET ORAL THREE TIMES A DAY
Qty: 0 | Refills: 0 | Status: DISCONTINUED | OUTPATIENT
Start: 2018-05-29 | End: 2018-05-29

## 2018-05-29 RX ORDER — SODIUM CHLORIDE 0.65 %
1 AEROSOL, SPRAY (ML) NASAL
Qty: 0 | Refills: 0 | Status: DISCONTINUED | OUTPATIENT
Start: 2018-05-29 | End: 2018-06-09

## 2018-05-29 RX ORDER — WARFARIN SODIUM 2.5 MG/1
5 TABLET ORAL ONCE
Qty: 0 | Refills: 0 | Status: COMPLETED | OUTPATIENT
Start: 2018-05-29 | End: 2018-05-29

## 2018-05-29 RX ADMIN — Medication 5 MILLIGRAM(S): at 12:25

## 2018-05-29 RX ADMIN — SEVELAMER CARBONATE 1600 MILLIGRAM(S): 2400 POWDER, FOR SUSPENSION ORAL at 18:24

## 2018-05-29 RX ADMIN — Medication 16.88 MICROGRAM(S)/KG/MIN: at 18:25

## 2018-05-29 RX ADMIN — CHLORHEXIDINE GLUCONATE 1 APPLICATION(S): 213 SOLUTION TOPICAL at 12:26

## 2018-05-29 RX ADMIN — Medication 1 SPRAY(S): at 22:11

## 2018-05-29 RX ADMIN — SEVELAMER CARBONATE 1600 MILLIGRAM(S): 2400 POWDER, FOR SUSPENSION ORAL at 13:28

## 2018-05-29 RX ADMIN — WARFARIN SODIUM 5 MILLIGRAM(S): 2.5 TABLET ORAL at 23:00

## 2018-05-29 RX ADMIN — Medication 5 MILLIGRAM(S): at 23:00

## 2018-05-29 RX ADMIN — AMIODARONE HYDROCHLORIDE 100 MILLIGRAM(S): 400 TABLET ORAL at 06:19

## 2018-05-29 RX ADMIN — Medication 16.88 MICROGRAM(S)/KG/MIN: at 23:00

## 2018-05-29 RX ADMIN — SEVELAMER CARBONATE 1600 MILLIGRAM(S): 2400 POWDER, FOR SUSPENSION ORAL at 09:00

## 2018-05-29 RX ADMIN — MIDODRINE HYDROCHLORIDE 10 MILLIGRAM(S): 2.5 TABLET ORAL at 17:01

## 2018-05-29 RX ADMIN — Medication 1 SPRAY(S): at 18:25

## 2018-05-29 RX ADMIN — Medication 1 SPRAY(S): at 12:30

## 2018-05-29 RX ADMIN — Medication 81 MILLIGRAM(S): at 12:25

## 2018-05-29 RX ADMIN — FINASTERIDE 5 MILLIGRAM(S): 5 TABLET, FILM COATED ORAL at 12:25

## 2018-05-29 RX ADMIN — SENNA PLUS 2 TABLET(S): 8.6 TABLET ORAL at 23:00

## 2018-05-29 RX ADMIN — ATORVASTATIN CALCIUM 80 MILLIGRAM(S): 80 TABLET, FILM COATED ORAL at 23:00

## 2018-05-29 RX ADMIN — Medication 75 MICROGRAM(S): at 06:19

## 2018-05-29 NOTE — CONSULT NOTE ADULT - PROBLEM SELECTOR PROBLEM 1
ESRD (end stage renal disease) on dialysis
Shortness of breath
CHF (congestive heart failure)
Acute on chronic systolic heart failure

## 2018-05-29 NOTE — CONSULT NOTE ADULT - PROBLEM SELECTOR PROBLEM 2
Cardiogenic shock
CHF (congestive heart failure)
CHF (congestive heart failure)
ESRD (end stage renal disease) on dialysis

## 2018-05-29 NOTE — CONSULT NOTE ADULT - ASSESSMENT
64 y/o M PMHx significant for severe CHF, on chronic dobutamine gtt at home x3 years (follows up with Dr. Davis), AFib on coumadin, AICD, ESRD on HD MWF (plus add'l session on Saturday prn), pulmonary fibrosis, presents to the ED with chief complaint of epistaxis that started yesterday. More significantly, patient with chronic SOB that is worsened from baseline, likely multifactorial d/t volume overload likely 2/2 ESRD and CHF. Patient has been on dobutamine gtt, but may need titratable dosing. Will consult CCU.

## 2018-05-29 NOTE — CONSULT NOTE ADULT - SUBJECTIVE AND OBJECTIVE BOX
Patient is a 63y old  Male who presents with a chief complaint of SOB (27 May 2018 17:23)      HPI:  Patient seen and examined at the bedside. Patient transferred to general medical floor with significant dyspnea which manifests with minimal activity. Therefore, comprehensive HPI was not possible to elicit at this time. Briefly, 64 y/o M PMHx significant for severe CHF, on chronic dobutamine gtt at home x 3 years (follows up with Dr. Davis), AFib on coumadin, AICD, ESRD on HD MWF (plus add'l session on Saturday prn), COPD (on home O2), pulmonary fibrosis, presents to the ED with chief complaint of epistaxis that started yesterday. Patient reports intermittent nose bleed that responded to direct pressure. However, bleeding would soon recur afterwards. He also reports chronic SOB with minimal exertion, such as transferring himself between chairs and minimal walking. He says this has been bothering him "all the time." No CP, cough or fever. Patient had an additional round of HD yesterday.     In the ED, vitals were: Temp 97.5F, 70s-90s SBP, HR WNL, and tachypnea to RR 24. Patient given midodrine 10mg x 1. (27 May 2018 17:23)   pt currently is doing ok , sitting on bed without obvious SOB : He was in the hospital few weeks ago and at that time he was given trial of steroids without much improvement     ?FOLLOWING PRESENT  [x ] Hx of PE/DVT, [ ?] Hx COPD, [ ] Hx of Asthma, [y ] Hx of Hospitalization, [ x]  Hx of BiPAP/CPAP use, [x ] Hx of ALONZO    Allergies    No Known Allergies    Intolerances        PAST MEDICAL & SURGICAL HISTORY:  Seizure: Off Keppra since 7/2017  Chronic hypotension  AF (atrial fibrillation)  COPD (chronic obstructive pulmonary disease): 4L home O2  HLD (hyperlipidemia)  DM (diabetes mellitus): Off Insulin since 7/2017  ESRD (end stage renal disease) on dialysis  BPH (benign prostatic hypertrophy)  Myocardial infarction: 10/2011  Gout  CHF (congestive heart failure)  AICD (automatic cardioverter/defibrillator) present: Biotronic - placed 9/11/09  H/O coronary angiogram      FAMILY HISTORY:  No pertinent family history in first degree relatives      Social History: [  x] TOBACCO                  [  x] ETOH                                 [x  ] IVDA/DRUGS    REVIEW OF SYSTEMS      General:x	    Skin/Breast:x  	  Ophthalmologic:x  	  ENMT:	x    Respiratory and Thorax: sob, flores   	  Cardiovascular:	x    Gastrointestinal:	x    Genitourinary:	x    Musculoskeletal:	x    Neurological:	x    Psychiatric:x	    Hematology/Lymphatics:	 pedal edema++    Endocrine:	xx    Allergic/Immunologic:	x    MEDICATIONS  (STANDING):  amiodarone    Tablet 100 milliGRAM(s) Oral daily  aspirin enteric coated 81 milliGRAM(s) Oral daily  atorvastatin 80 milliGRAM(s) Oral at bedtime  chlorhexidine 4% Liquid 1 Application(s) Topical <User Schedule>  DOBUTamine Infusion 7.5 MICROgram(s)/kG/Min (16.875 mL/Hr) IV Continuous <Continuous>  finasteride 5 milliGRAM(s) Oral daily  levothyroxine 75 MICROGram(s) Oral daily  midodrine 10 milliGRAM(s) Oral three times a day  senna 2 Tablet(s) Oral at bedtime  sevelamer hydrochloride 1600 milliGRAM(s) Oral <User Schedule>  sodium chloride 0.65% Nasal 1 Spray(s) Both Nostrils four times a day    MEDICATIONS  (PRN):  ALBUTerol/ipratropium for Nebulization 3 milliLiter(s) Nebulizer every 6 hours PRN Shortness of Breath and/or Wheezing  docusate sodium 100 milliGRAM(s) Oral two times a day PRN Constipation       Vital Signs Last 24 Hrs  T(C): 36.7 (29 May 2018 08:36), Max: 36.8 (28 May 2018 20:00)  T(F): 98 (29 May 2018 08:36), Max: 98.2 (28 May 2018 20:00)  HR: 62 (29 May 2018 13:00) (62 - 98)  BP: 98/54 (29 May 2018 13:00) (86/53 - 120/59)  BP(mean): 65 (29 May 2018 13:00) (57 - 79)  RR: 19 (29 May 2018 13:00) (12 - 27)  SpO2: 95% (29 May 2018 13:00) (90% - 98%)        I&O's Summary    28 May 2018 07:01  -  29 May 2018 07:00  --------------------------------------------------------  IN: 1746 mL / OUT: 1550 mL / NET: 196 mL    29 May 2018 07:01  -  29 May 2018 13:07  --------------------------------------------------------  IN: 222 mL / OUT: 0 mL / NET: 222 mL        Physical Exam:   GENERAL: NAD, well-groomed, well-developed  HEENT: BELL/   Atraumatic, Normocephalic  ENMT: No tonsillar erythema, exudates, or enlargement; Moist mucous membranes, Good dentition, No lesions  NECK: Supple, No JVD, Normal thyroid  CHEST/LUNG: bilateral crackles +   CVS: Regular rate and rhythm; No murmurs, rubs, or gallops  GI: : Soft, Nontender, Nondistended; Bowel sounds present  NERVOUS SYSTEM:  Alert & Oriented X3  EXTREMITIES:  2+ edema  LYMPH: No lymphadenopathy noted  SKIN: No rashes or lesions  ENDOCRINOLOGY: No Thyromegaly  PSYCH: Appropriate    Labs:  2.2<61<4>>24<<7.295>>2.2<<3><<4><<5<<249>>                            11.3   6.79  )-----------( 97       ( 29 May 2018 04:00 )             37.5                         11.6   6.45  )-----------( 101      ( 28 May 2018 02:57 )             38.0                         11.6   5.87  )-----------( 103      ( 27 May 2018 11:48 )             38.4     05-29    133<L>  |  93<L>  |  34<H>  ----------------------------<  130<H>  4.3   |  27  |  4.51<H>  05-28    131<L>  |  91<L>  |  50<H>  ----------------------------<  133<H>  4.4   |  24  |  5.62<H>  05-27    133<L>  |  94<L>  |  43<H>  ----------------------------<  238<H>  4.1   |  26  |  4.88<H>    Ca    8.7      29 May 2018 04:00  Ca    9.1      28 May 2018 02:57  Phos  3.1     05-29  Phos  4.0     05-28  Mg     2.1     05-29  Mg     2.4     05-28    TPro  8.2  /  Alb  3.3  /  TBili  0.5  /  DBili  x   /  AST  29  /  ALT  28  /  AlkPhos  211<H>  05-28  TPro  8.1  /  Alb  3.3  /  TBili  0.4  /  DBili  x   /  AST  30  /  ALT  29  /  AlkPhos  215<H>  05-27    CAPILLARY BLOOD GLUCOSE      POCT Blood Glucose.: 272 mg/dL (28 May 2018 18:16)    LIVER FUNCTIONS - ( 28 May 2018 02:57 )  Alb: 3.3 g/dL / Pro: 8.2 g/dL / ALK PHOS: 211 u/L / ALT: 28 u/L / AST: 29 u/L / GGT: x           PT/INR - ( 29 May 2018 04:00 )   PT: 16.2 SEC;   INR: 1.45              D DImer      Studies  Chest X-RAY  CT SCAN Chest   CT Abdomen  Venous Dopplers: LE:   Others    < from: Xray Chest 1 View- PORTABLE-Urgent (05.27.18 @ 12:13) >        INTERPRETATION:  CLINICAL INFORMATION: History of CHF, shortness of   breath.    TECHNIQUE: AP view of the chest 5/27/2018.     COMPARISON: Chest radiograph 2/26/2018.    FINDINGS:     AICD overlies the left chest. A right-sided hemodialysis catheter with   the distal tip in the right atrium. There are diffuse bilateral reticular   opacities. There is a right pleural effusion. There is no pneumothorax.   Cardiac sizeis not accurately evaluated in this projection.  The visualized osseous structures demonstrate no acute abnormality.    IMPRESSION:   Pulmonary edema. Right pleural effusion.              LIU MUSE M.D., RADIOLOGY RESIDENT  This document has been electronically signed.  MARINE HERNÁNDEZ M.D.; ATTENDING RADIOLOGIST  This document has been electronically signed. May 28 2018  8:34AM    < end of copied text >

## 2018-05-29 NOTE — DISCHARGE NOTE ADULT - PATIENT PORTAL LINK FT
You can access the [facility] Patient Portal, offered by [facility], by registering with the following website: [email or web address]

## 2018-05-29 NOTE — CONSULT NOTE ADULT - PROBLEM SELECTOR RECOMMENDATION 2
As above.
cont current care per cardiology
follow up with cardiology
Patient tolerating dialysis. Continue care as per Nephrology team.

## 2018-05-29 NOTE — DISCHARGE NOTE ADULT - PROVIDER TOKENS
TOKEN:'[id number]',TOKEN:'[id number]' TOKEN:'[id number]',TOKEN:'[id number]',FREE:[LAST:[Chika],FIRST:[Kolton],PHONE:[phone]:[phone]],ADDRESS:[[address] [address]]]

## 2018-05-29 NOTE — PROGRESS NOTE ADULT - PROBLEM SELECTOR PLAN 9
bleed that prompted admission resolved w pressure.   - cont humidified O2  - start nasal spray during day to keep nares moist  - called home O2 supplier at [phone]; they said that they would provide pt/family with attachment(s) to permit humidification of home O2

## 2018-05-29 NOTE — PROGRESS NOTE ADULT - PROBLEM SELECTOR PLAN 1
chf., tacypneic at rest.will rpt HD, UFR only, attempt to remove 3 kg  low Na 2/2 hypervolemia. chronic non adherance w/fluid restriction   renal diet , fluid restriction 1L/day

## 2018-05-29 NOTE — CONSULT NOTE ADULT - ATTENDING COMMENTS
64 yo M with frequent admissions for decompensated HF, LVEF 15% on chronic , now ESRD on HD, & IPF on home O2, who was admitted for worsening dyspnea and epistaxis. The epistaxis has resolved, but he still describes orthopnea. No LHC in our system and I am told he has been on  for 3 years with escalation of the dosing. At this point, he likely has tachyphylaxis to the  and it won't be very helpful with ongoing exposure. Given his complex disease, I would recommend the followin. PAC from [facility] in CCU today to measure filling pressures and cardiac output.  2. Optimization of volume status with HD, including extra sessions, if required.  3. When able to lay flat for LHC, would interrogate his coronaries.  4. Unclear reason for ECASA in patient with epistaxis who is on warfarin for AFib. Consider stopping the ASA.  5. Please review the dose of midodrine! A 30 mg dose TID is much higher than usual.    Will follow-up once we have the data to review from the [facility]. Would use this admission to optimize his fluid status with HD and see if he does better on low dose milrinone to avoid the issue of tachyphylaxis. Even in the setting of renal impairment, the milrinone at low dose is likely a better choice. 64 yo M with frequent admissions for decompensated HF, LVEF 15% on chronic , now ESRD on HD, & IPF on home O2, who was admitted for worsening dyspnea and epistaxis. The epistaxis has resolved, but he still describes orthopnea. No LHC in our system and I am told he has been on  for 3 years with escalation of the dosing. At this point, he likely has tachyphylaxis to the  and it won't be very helpful with ongoing exposure. PE is notable for mild right basilar fine crackles, but otherwise CTA, and cool BLE. Given his complex disease, I would recommend the followin. PAC from [facility] in CCU today to measure filling pressures and cardiac output.  2. Optimization of volume status with HD, including extra sessions, if required. If steroids are needed for his pulmonary disease, they should be dispensed per Pulm recommendations and we will manage fluid, as needed.  3. When able to lay flat for LHC, would interrogate his coronaries.  4. Unclear reason for ECASA in patient with epistaxis who is on warfarin for AFib. Consider stopping the ASA.  5. Please review the dose of midodrine! A 30 mg dose TID is much higher than usual.    Will follow-up once we have the data to review from the [facility]. Would use this admission to optimize his fluid status with HD and see if he does better on low dose milrinone to avoid the issue of tachyphylaxis. Even in the setting of renal impairment, the milrinone at low dose is likely a better choice.

## 2018-05-29 NOTE — CONSULT NOTE ADULT - SUBJECTIVE AND OBJECTIVE BOX
Date of Admission: 18    CHIEF COMPLAINT:    HISTORY OF PRESENT ILLNESS:    63 y/o male w/ PMHX of NICM?, HFrEF (LVEF 21%, LVIDd 6.2 cm, w/ AICD) on chronic home dobutamine 7.5 mcg/kg/min,  ESRD on HD, HLD, DM II, a.fib on coumadin, interstitial pulmonary lung disease on chronic home O2, hypotension on midodrine comes in with     He has been admitted 6 times this year for various reasons, but mostly for SOB.  RHC 17 on 2.5 mcg/kg/min of dobutamine 2.5 mcg/kg/min showed RA 25, PCWP 25, PA 61/29/39, PA sat 42.8%, CI 1.65, CO 3.60, PVR 3.84. Currently He is on  7.5 mcg/kg/min.    He is a non-smoker, no ETOH or drug abuse. Was not exposed to any toxic chemicals in past.         Allergies    No Known Allergies      MEDICATIONS:  amiodarone    Tablet 100 milliGRAM(s) Oral daily  aspirin enteric coated 81 milliGRAM(s) Oral daily  DOBUTamine Infusion 7.5 MICROgram(s)/kG/Min IV Continuous <Continuous>  midodrine 10 milliGRAM(s) Oral three times a day  ALBUTerol/ipratropium for Nebulization 3 milliLiter(s) Nebulizer every 6 hours PRN  docusate sodium 100 milliGRAM(s) Oral two times a day PRN  senna 2 Tablet(s) Oral at bedtime  atorvastatin 80 milliGRAM(s) Oral at bedtime  finasteride 5 milliGRAM(s) Oral daily  levothyroxine 75 MICROGram(s) Oral daily  chlorhexidine 4% Liquid 1 Application(s) Topical <User Schedule>  sodium chloride 0.65% Nasal 1 Spray(s) Both Nostrils four times a day      PAST MEDICAL & SURGICAL HISTORY:  Seizure: Off Keppra since 2017  Chronic hypotension  AF (atrial fibrillation)  COPD (chronic obstructive pulmonary disease): 4L home O2  HLD (hyperlipidemia)  DM (diabetes mellitus): Off Insulin since 2017  ESRD (end stage renal disease) on dialysis  BPH (benign prostatic hypertrophy)  Myocardial infarction: 10/2011  Gout  CHF (congestive heart failure)  AICD (automatic cardioverter/defibrillator) present: Biotronic - placed 09  H/O coronary angiogram      FAMILY HISTORY:  No pertinent family history in first degree relatives      SOCIAL HISTORY:    No ETOH, drug use or smoking.      REVIEW OF SYSTEMS:  CONSTITUTIONAL: No fever, weight loss, or fatigue  EYES: No eye pain, visual disturbances, or discharge  ENMT:  No difficulty hearing, tinnitus, vertigo; No sinus or throat pain  NECK: No pain or stiffness  RESPIRATORY: No cough, wheezing, chills or hemoptysis; No Shortness of Breath  CARDIOVASCULAR: No chest pain, palpitations, passing out, dizziness, or leg swelling  GASTROINTESTINAL: No abdominal or epigastric pain. No nausea, vomiting, or hematemesis; No diarrhea or constipation. No melena or hematochezia.  GENITOURINARY: No dysuria, frequency, hematuria, or incontinence  NEUROLOGICAL: No headaches, memory loss, loss of strength, numbness, or tremors  SKIN: No itching, burning, rashes, or lesions   LYMPH Nodes: No enlarged glands  ENDOCRINE: No heat or cold intolerance; No hair loss  MUSCULOSKELETAL: No joint pain or swelling; No muscle, back, or extremity pain  PSYCHIATRIC: No depression, anxiety, mood swings, or difficulty sleeping  HEME/LYMPH: No easy bruising, or bleeding gums  ALLERY AND IMMUNOLOGIC: No hives or eczema	      PHYSICAL EXAM:  T(C): 36.7 (18 @ 08:36), Max: 36.8 (18 @ 20:00)  HR: 68 (18 @ 12:00) (67 - 98)  BP: 95/44 (18 @ 12:00) (86/53 - 120/59)  RR: 20 (18 @ 12:00) (12 - 27)  SpO2: 93% (18 @ 12:00) (90% - 98%)  I&O's Summary    28 May 2018 07:  -  29 May 2018 07:00  --------------------------------------------------------  IN: 1746 mL / OUT: 1550 mL / NET: 196 mL    29 May 2018 07:  -  29 May 2018 12:41  --------------------------------------------------------  IN: 205 mL / OUT: 0 mL / NET: 205 mL        Appearance: Normal	  HEENT:   Normal oral mucosa, PERRL, EOMI	  Lymphatic: No lymphadenopathy  Cardiovascular: Normal S1 S2, No JVD, No murmurs, No edema  Respiratory: Lungs clear to auscultation	  Psychiatry: A & O x 3, Mood & affect appropriate  Gastrointestinal:  Soft, Non-tender, + BS	  Skin: No rashes, No ecchymoses, No cyanosis	  Neurologic: Non-focal  Extremities: Normal range of motion, No clubbing, cyanosis or edema  Vascular: Peripheral pulses palpable 2+ bilaterally        LABS:	 	    CBC Full  -  ( 29 May 2018 04:00 )  WBC Count : 6.79 K/uL  Hemoglobin : 11.3 g/dL  Hematocrit : 37.5 %  Platelet Count - Automated : 97 K/uL  Mean Cell Volume : 101.1 fL  Mean Cell Hemoglobin : 30.5 pg  Mean Cell Hemoglobin Concentration : 30.1 %          133<L>  |  93<L>  |  34<H>  ----------------------------<  130<H>  4.3   |  27  |  4.51<H>      131<L>  |  91<L>  |  50<H>  ----------------------------<  133<H>  4.4   |  24  |  5.62<H>    Ca    8.7      29 May 2018 04:00  Ca    9.1      28 May 2018 02:57  Phos  3.1       Phos  4.0       Mg     2.1       Mg     2.4         TPro  8.2  /  Alb  3.3  /  TBili  0.5  /  DBili  x   /  AST  29  /  ALT  28  /  AlkPhos  211<H>        < from: TTE with Doppler (w/Cont) (18 @ 08:43) >  DIMENSIONS:  Dimensions:     Normal Values:  LA:     5.2 cm    2.0 - 4.0 cm  Ao:     3.2 cm    2.0 - 3.8 cm  SEPTUM: 1.0 cm    0.6 - 1.2 cm  PWT:    0.9 cm    0.6 - 1.1 cm  LVIDd:  6.0 cm    3.0 - 5.6 cm  LVIDs:  5.6 cm    1.8 - 4.0 cm  Derived Variables:  LVMI: 122 g/m2  RWT: 0.30  Fractional short: 7 %  Ejection Fraction (Teicholtz): 15 %  ------------------------------------------------------------------------  OBSERVATIONS:  Mitral Valve: Normal mitral valve. Mild mitral  regurgitation.  Aortic Root: Normal aortic root.  Aortic Valve: Normal trileaflet aortic valve. Peak left  ventricular outflow tract gradient equals 3.2 mm Hg, mean  gradient is equal to 1 mm Hg, LVOT velocity time integral  equals 16 cm.  Left Atrium: Mildly dilated left atrium.  LA volume index =  40 cc/m2.  Left Ventricle: Severe global left ventricular systolic  dysfunction.   Endocardial visualization enhanced with  intravenous injection of echo contrast (Definity). No left  ventricular thrombus.   Septal motion consistent with right  ventricular overload. Eccentric left ventricular  hypertrophy (dilated left ventricle with normal relative  wall thickness). (DT:449 ms).  Right Heart: Severe right atrial enlargement.   A device  wire is noted in the right heart. Right ventricular  enlargement with decreased right ventricular systolic  function. Normal tricuspid valve.  Severe tricuspid  regurgitation. Normal pulmonic valve. Mild pulmonic  regurgitation.  Pericardium/PleuraSmall pericardial effusion.  Hemodynamic: Estimated right ventricular systolic pressure  equals 56 mm Hg, assuming right atrial pressure equals 10  mm Hg, consistent with moderate pulmonary hypertension.  ------------------------------------------------------------------------    < end of copied text >    < from: Xray Chest 1 View- PORTABLE-Urgent (18 @ 12:13) >  AICD overlies the left chest. A right-sided hemodialysis catheter with   the distal tip in the right atrium. There are diffuse bilateral reticular   opacities. There is a right pleural effusion. There is no pneumothorax.   Cardiac sizeis not accurately evaluated in this projection.  The visualized osseous structures demonstrate no acute abnormality.    IMPRESSION:   Pulmonary edema. Right pleural effusion.    < end of copied text >    < from: CT Chest No Cont (18 @ 16:15) >  LUNGS AND LARGE AIRWAYS: Unchanged traction bronchiectasis and bilateral   groundglass opacities.  PLEURA: Small right pleural effusion.  VESSELS: Right-sided central venous catheter with tip terminating in the   right atrium.   HEART: Cardiomegaly. Left chest wall ICD. No pericardial effusion.  MEDIASTINUM AND ASH: No lymphadenopathy.  CHEST WALL AND LOWER NECK: Within normal limits.  VISUALIZED UPPER ABDOMEN: Partially imaged dense material inside the   gallbladder, possibly gallstones.  BONES: Degenerative changes of the spine.    IMPRESSION:   Unchanged interstitial lung disease. No new consolidation.    < end of copied text >    < from: Cardiac Cath Lab - Adult (17 @ 15:25) >  Signed 2017 16:28:00  HEMODYNAMIC TABLES  Pressures:  Baseline  Pressures:  - HR: 78  Pressures:  - Rhythm:  Pressures:  -- Aortic Pressure (S/D/M): 116/70/87  Pressures:  -- Pulmonary Artery (S/D/M): 61/29/39  Pressures:  -- Pulmonary Capillary Wedge: 29/32/25  Pressures:  -- Right Atrium (a/v/M): 29/31/25  Pressures:  -- Right Ventricle (s/edp): 61/28/--  O2 Sats:  Baseline  O2 Sats:  - HR: 78  O2 Sats:  - Rhythm:  O2 Sats:  -- AO: 10.5/96/13.71  O2 Sats:  -- PA: 10.5/42.8/6.11  Outputs:  Baseline  Outputs:  -- CALCULATIONS[age]  Outputs:  -- CALCULATIONS: Body Surface Area: 2.19  Outputs:  -- CALCULATIONS: Height in cm: 180.00  Outputs:  -- CALCULATIONS: Sex: Male  Outputs:  -- CALCULATIONS: Weight in k.30  Outputs:  -- OUTPUTS: CO by Cj: 3.60  Outputs:  -- OUTPUTS: Cj cardiac index: 1.65  Outputs:-- OUTPUTS: O2 consumption: 273.56  Outputs:  -- OUTPUTS: Vo2 Indexed: 125.00  Outputs:  -- RESISTANCES: Left ventricular stroke work: 38.43  Outputs:  -- RESISTANCES: Left Ventricular Stroke Work index: 17.56  Outputs:  -- RESISTANCES: Pulmonary vascular index (dsc): 680.52  Outputs:  -- RESISTANCES: Pulmonary vascular index (Wood Units): 8.51  Outputs:  -- RESISTANCES: Pulmonary vascular resistance (dsc): 310.95  Outputs:  -- RESISTANCES: Pulmonary vascular resistance (Wood Units): 3.89  Outputs:  -- RESISTANCES: PVR_SVR Ratio: 0.23  Outputs:  -- RESISTANCES: Right ventricular stroke work: 8.79  Outputs:  -- RESISTANCES: Right ventricular stroke work index: 4.02  Outputs:  -- RESISTANCES: Systemic vascular index (dsc): 3013.72  Outputs:  --RESISTANCES: Systemic vascular index (Wood Units): 37.68  Outputs:  -- RESISTANCES: Systemic vascular resistance (dsc): 1377.08  Outputs:  -- RESISTANCES: Systemic vascular resistance (Wood Units): 17.22  Outputs:  -- RESISTANCES: Total pulmonary index (dsc): 1895.73  Outputs:  -- RESISTANCES: Total pulmonary index (Wood Units): 23.70  Outputs:  -- RESISTANCES: Total pulmonary resistance (dsc): 866.23  Outputs:  -- RESISTANCES: Total pulmonary resistance (Wood Units): 10.83  Outputs:  -- RESISTANCES: Total vascular index (Wood Units): 52.87  Outputs:  -- RESISTANCES: Total vascular resistance (dsc): 1932.36  Outputs:  -- RESISTANCES: Total vascular resistance (Wood Units): 24.16  Outputs:  -- RESISTANCES: Total vascular resistance index (dsc): 4228.93  Outputs:  -- RESISTANCES: TPR_TVR Ratio: 0.45  Outputs:  -- SHUNTS: Pulmonary flow: 3.60  Outputs:  -- SHUNTS: Qp Indexed: 1.65  Outputs:  -- SHUNTS: Qs Indexed: 1.65  Outputs:  -- SHUNTS: Systemic flow: 3.60    < end of copied text > Date of Admission: 18    CHIEF COMPLAINT: Epistaxis     HISTORY OF PRESENT ILLNESS:    64 y/o male w/ PMHX of MI in past (Per patient, [facility]) no stents, HFrEF (LVEF 15%, LVIDd 6.0 cm, w/ AICD) severe TR, on chronic home dobutamine 7.5 mcg/kg/min,  ESRD on HD (3-4 x a week), HLD, DM II, a.fib on coumadin, interstitial pulmonary lung disease on chronic home O2, hypotension on midodrine comes in with epistaxis. Epistaxis is now resolved. He was found to be hypervolemic, and was given UF the same night, and HD the next day. He will have HD today as well. He has been admitted 6 times this year for various reasons, but mostly for SOB.  RHC 17 on 2.5 mcg/kg/min of dobutamine 2.5 mcg/kg/min showed RA 25, PCWP 25, PA 61/29/39, PA sat 42.8%, CI 1.65, CO 3.60, PVR 3.84. Currently He is on  7.5 mcg/kg/min. He admits to SOB at rest sometimes, but mostly with minimal exertion (typically walking 10 steps), orthopnea. No CP, palpitations, dizziness or syncope. ICD does not reveal any events.     He is a non-smoker, no ETOH or drug abuse. Was not exposed to any toxic chemicals in past. Lives with his son.         Allergies    No Known Allergies      MEDICATIONS:  amiodarone    Tablet 100 milliGRAM(s) Oral daily  aspirin enteric coated 81 milliGRAM(s) Oral daily  DOBUTamine Infusion 7.5 MICROgram(s)/kG/Min IV Continuous <Continuous>  midodrine 10 milliGRAM(s) Oral three times a day  ALBUTerol/ipratropium for Nebulization 3 milliLiter(s) Nebulizer every 6 hours PRN  docusate sodium 100 milliGRAM(s) Oral two times a day PRN  senna 2 Tablet(s) Oral at bedtime  atorvastatin 80 milliGRAM(s) Oral at bedtime  finasteride 5 milliGRAM(s) Oral daily  levothyroxine 75 MICROGram(s) Oral daily  chlorhexidine 4% Liquid 1 Application(s) Topical <User Schedule>  sodium chloride 0.65% Nasal 1 Spray(s) Both Nostrils four times a day      PAST MEDICAL & SURGICAL HISTORY:  Seizure: Off Keppra since 2017  Chronic hypotension  AF (atrial fibrillation)  COPD (chronic obstructive pulmonary disease): 4L home O2  HLD (hyperlipidemia)  DM (diabetes mellitus): Off Insulin since 2017  ESRD (end stage renal disease) on dialysis  BPH (benign prostatic hypertrophy)  Myocardial infarction: 10/2011  Gout  CHF (congestive heart failure)  AICD (automatic cardioverter/defibrillator) present: Biotronic - placed 09  H/O coronary angiogram      FAMILY HISTORY:  No pertinent family history in first degree relatives      SOCIAL HISTORY:    No ETOH, drug use or smoking.      REVIEW OF SYSTEMS:  CONSTITUTIONAL: No fever, weight loss, admits to fatigue  EYES: No eye pain, visual disturbances, or discharge  ENMT:  No difficulty hearing, tinnitus, vertigo; No sinus or throat pain  NECK: No pain or stiffness  RESPIRATORY: No cough, wheezing, chills or hemoptysis; admits to Shortness of Breath  CARDIOVASCULAR: No chest pain, palpitations, passing out, dizziness, or leg swelling  GASTROINTESTINAL: No abdominal or epigastric pain. No nausea, vomiting, or hematemesis; No diarrhea or constipation. No melena or hematochezia.  GENITOURINARY: No dysuria, frequency, hematuria, or incontinence  NEUROLOGICAL: No headaches, memory loss, loss of strength, numbness, or tremors  SKIN: No itching, burning, rashes, or lesions   LYMPH Nodes: No enlarged glands  ENDOCRINE: No heat or cold intolerance; No hair loss  MUSCULOSKELETAL: No joint pain or swelling; No muscle, back, or extremity pain  PSYCHIATRIC: No depression, anxiety, mood swings, or difficulty sleeping  HEME/LYMPH: No easy bruising, or bleeding gums  ALLERY AND IMMUNOLOGIC: No hives or eczema	      PHYSICAL EXAM:  T(C): 36.7 (18 @ 08:36), Max: 36.8 (18 @ 20:00)  HR: 68 (18 @ 12:00) (67 - 98)  BP: 95/44 (18 @ 12:00) (86/53 - 120/59)  RR: 20 (18 @ 12:00) (12 - 27)  SpO2: 93% (18 @ 12:00) (90% - 98%)  I&O's Summary    28 May 2018 07:  -  29 May 2018 07:00  --------------------------------------------------------  IN: 1746 mL / OUT: 1550 mL / NET: 196 mL    29 May 2018 07:  -  29 May 2018 12:41  --------------------------------------------------------  IN: 205 mL / OUT: 0 mL / NET: 205 mL        Appearance: Normal	  HEENT:   Normal oral mucosa, PERRL, EOMI	  Lymphatic: No lymphadenopathy  Cardiovascular: Normal S1 S2, moderate JVD, No murmurs, No edema. Cool b/l.   Respiratory: diffuse crackles b/l. 	  Psychiatry: A & O x 3, Mood & affect appropriate  Gastrointestinal:  Soft, Non-tender, + BS	  Skin: No rashes, No ecchymoses, No cyanosis	  Neurologic: Non-focal  Extremities: Normal range of motion, No clubbing, cyanosis or edema  Vascular: Peripheral pulses palpable 2+ bilaterally        LABS:	 	    CBC Full  -  ( 29 May 2018 04:00 )  WBC Count : 6.79 K/uL  Hemoglobin : 11.3 g/dL  Hematocrit : 37.5 %  Platelet Count - Automated : 97 K/uL  Mean Cell Volume : 101.1 fL  Mean Cell Hemoglobin : 30.5 pg  Mean Cell Hemoglobin Concentration : 30.1 %          133<L>  |  93<L>  |  34<H>  ----------------------------<  130<H>  4.3   |  27  |  4.51<H>      131<L>  |  91<L>  |  50<H>  ----------------------------<  133<H>  4.4   |  24  |  5.62<H>    Ca    8.7      29 May 2018 04:00  Ca    9.1      28 May 2018 02:57  Phos  3.1       Phos  4.0       Mg     2.1       Mg     2.4         TPro  8.2  /  Alb  3.3  /  TBili  0.5  /  DBili  x   /  AST  29  /  ALT  28  /  AlkPhos  211<H>        < from: TTE with Doppler (w/Cont) (18 @ 08:43) >  DIMENSIONS:  Dimensions:     Normal Values:  LA:     5.2 cm    2.0 - 4.0 cm  Ao:     3.2 cm    2.0 - 3.8 cm  SEPTUM: 1.0 cm    0.6 - 1.2 cm  PWT:    0.9 cm    0.6 - 1.1 cm  LVIDd:  6.0 cm    3.0 - 5.6 cm  LVIDs:  5.6 cm    1.8 - 4.0 cm  Derived Variables:  LVMI: 122 g/m2  RWT: 0.30  Fractional short: 7 %  Ejection Fraction (Teicholtz): 15 %  ------------------------------------------------------------------------  OBSERVATIONS:  Mitral Valve: Normal mitral valve. Mild mitral  regurgitation.  Aortic Root: Normal aortic root.  Aortic Valve: Normal trileaflet aortic valve. Peak left  ventricular outflow tract gradient equals 3.2 mm Hg, mean  gradient is equal to 1 mm Hg, LVOT velocity time integral  equals 16 cm.  Left Atrium: Mildly dilated left atrium.  LA volume index =  40 cc/m2.  Left Ventricle: Severe global left ventricular systolic  dysfunction.   Endocardial visualization enhanced with  intravenous injection of echo contrast (Definity). No left  ventricular thrombus.   Septal motion consistent with right  ventricular overload. Eccentric left ventricular  hypertrophy (dilated left ventricle with normal relative  wall thickness). (DT:449 ms).  Right Heart: Severe right atrial enlargement.   A device  wire is noted in the right heart. Right ventricular  enlargement with decreased right ventricular systolic  function. Normal tricuspid valve.  Severe tricuspid  regurgitation. Normal pulmonic valve. Mild pulmonic  regurgitation.  Pericardium/PleuraSmall pericardial effusion.  Hemodynamic: Estimated right ventricular systolic pressure  equals 56 mm Hg, assuming right atrial pressure equals 10  mm Hg, consistent with moderate pulmonary hypertension.  ------------------------------------------------------------------------    < end of copied text >    < from: Xray Chest 1 View- PORTABLE-Urgent (18 @ 12:13) >  AICD overlies the left chest. A right-sided hemodialysis catheter with   the distal tip in the right atrium. There are diffuse bilateral reticular   opacities. There is a right pleural effusion. There is no pneumothorax.   Cardiac sizeis not accurately evaluated in this projection.  The visualized osseous structures demonstrate no acute abnormality.    IMPRESSION:   Pulmonary edema. Right pleural effusion.    < end of copied text >    < from: CT Chest No Cont (18 @ 16:15) >  LUNGS AND LARGE AIRWAYS: Unchanged traction bronchiectasis and bilateral   groundglass opacities.  PLEURA: Small right pleural effusion.  VESSELS: Right-sided central venous catheter with tip terminating in the   right atrium.   HEART: Cardiomegaly. Left chest wall ICD. No pericardial effusion.  MEDIASTINUM AND ASH: No lymphadenopathy.  CHEST WALL AND LOWER NECK: Within normal limits.  VISUALIZED UPPER ABDOMEN: Partially imaged dense material inside the   gallbladder, possibly gallstones.  BONES: Degenerative changes of the spine.    IMPRESSION:   Unchanged interstitial lung disease. No new consolidation.    < end of copied text >    < from: Cardiac Cath Lab - Adult (17 @ 15:25) >  Signed 2017 16:28:00  HEMODYNAMIC TABLES  Pressures:  Baseline  Pressures:  - HR: 78  Pressures:  - Rhythm:  Pressures:  -- Aortic Pressure (S/D/M): 116/70/87  Pressures:  -- Pulmonary Artery (S/D/M): 61/29/39  Pressures:  -- Pulmonary Capillary Wedge: /  Pressures:  -- Right Atrium (a/v/M): /  Pressures:  -- Right Ventricle (s/edp): 61/28/--  O2 Sats:  Baseline  O2 Sats:  - HR: 78  O2 Sats:  - Rhythm:  O2 Sats:  -- AO: 10.5/96/13.71  O2 Sats:  -- PA: 10.5/42.8/6.11  Outputs:  Baseline  Outputs:  -- CALCULATIONS[age]  Outputs:  -- CALCULATIONS: Body Surface Area: 2.19  Outputs:  -- CALCULATIONS: Height in cm: 180.00  Outputs:  -- CALCULATIONS: Sex: Male  Outputs:  -- CALCULATIONS: Weight in k.30  Outputs:  -- OUTPUTS: CO by Cj: 3.60  Outputs:  -- OUTPUTS: Cj cardiac index: 1.65  Outputs:-- OUTPUTS: O2 consumption: 273.56  Outputs:  -- OUTPUTS: Vo2 Indexed: 125.00  Outputs:  -- RESISTANCES: Left ventricular stroke work: 38.43  Outputs:  -- RESISTANCES: Left Ventricular Stroke Work index: 17.56  Outputs:  -- RESISTANCES: Pulmonary vascular index (dsc): 680.52  Outputs:  -- RESISTANCES: Pulmonary vascular index (Wood Units): 8.51  Outputs:  -- RESISTANCES: Pulmonary vascular resistance (dsc): 310.95  Outputs:  -- RESISTANCES: Pulmonary vascular resistance (Wood Units): 3.89  Outputs:  -- RESISTANCES: PVR_SVR Ratio: 0.23  Outputs:  -- RESISTANCES: Right ventricular stroke work: 8.79  Outputs:  -- RESISTANCES: Right ventricular stroke work index: 4.02  Outputs:  -- RESISTANCES: Systemic vascular index (dsc): 3013.72  Outputs:  --RESISTANCES: Systemic vascular index (Wood Units): 37.68  Outputs:  -- RESISTANCES: Systemic vascular resistance (dsc): 1377.08  Outputs:  -- RESISTANCES: Systemic vascular resistance (Wood Units): 17.22  Outputs:  -- RESISTANCES: Total pulmonary index (dsc): 1895.73  Outputs:  -- RESISTANCES: Total pulmonary index (Wood Units): 23.70  Outputs:  -- RESISTANCES: Total pulmonary resistance (dsc): 866.23  Outputs:  -- RESISTANCES: Total pulmonary resistance (Wood Units): 10.83  Outputs:  -- RESISTANCES: Total vascular index (Wood Units): 52.87  Outputs:  -- RESISTANCES: Total vascular resistance (dsc): 1932.36  Outputs:  -- RESISTANCES: Total vascular resistance (Wood Units): 24.16  Outputs:  -- RESISTANCES: Total vascular resistance index (dsc): 4228.93  Outputs:  -- RESISTANCES: TPR_TVR Ratio: 0.45  Outputs:  -- SHUNTS: Pulmonary flow: 3.60  Outputs:  -- SHUNTS: Qp Indexed: 1.65  Outputs:  -- SHUNTS: Qs Indexed: 1.65  Outputs:  -- SHUNTS: Systemic flow: 3.60    < end of copied text >

## 2018-05-29 NOTE — PROGRESS NOTE ADULT - SUBJECTIVE AND OBJECTIVE BOX
SUBJECTIVE: Patient complaining of SOB upon movement from bed.  	  MEDICATIONS:  amiodarone    Tablet 100 milliGRAM(s) Oral daily  DOBUTamine Infusion 7.5 MICROgram(s)/kG/Min IV Continuous <Continuous>      ALBUTerol/ipratropium for Nebulization 3 milliLiter(s) Nebulizer every 6 hours PRN      docusate sodium 100 milliGRAM(s) Oral two times a day PRN  senna 2 Tablet(s) Oral at bedtime PRN    atorvastatin 80 milliGRAM(s) Oral at bedtime  finasteride 5 milliGRAM(s) Oral daily  levothyroxine 75 MICROGram(s) Oral daily    aspirin enteric coated 81 milliGRAM(s) Oral daily  chlorhexidine 4% Liquid 1 Application(s) Topical <User Schedule>      REVIEW OF SYSTEMS:    CONSTITUTIONAL: No fever, weight loss, or fatigue  EYES: No eye pain, visual disturbances, or discharge  NECK: No pain or stiffness  RESPIRATORY: No cough, wheezing, chills or hemoptysis; No Shortness of Breath  CARDIOVASCULAR: No chest pain, palpitations, dizziness, or leg swelling  GASTROINTESTINAL: No abdominal or epigastric pain. No nausea, vomiting, or hematemesis; No diarrhea or constipation. No melena or hematochezia.  GENITOURINARY: No dysuria, frequency, hematuria, or incontinence  NEUROLOGICAL: No headaches, memory loss, loss of strength, numbness, or tremors  SKIN: No itching, burning, rashes, or lesions   LYMPH Nodes: No enlarged glands  MUSCULOSKELETAL: No joint pain or swelling; No muscle, back, or extremity pain  All other review of systems are negative.  	  [ ] Unable to obtain    PHYSICAL EXAM:  T(C): 36.3 (05-29-18 @ 04:00), Max: 36.8 (05-28-18 @ 20:00)  HR: 84 (05-29-18 @ 08:00) (65 - 98)  BP: 108/70 (05-29-18 @ 08:00) (82/53 - 120/59)  RR: 20 (05-29-18 @ 08:00) (12 - 27)  SpO2: 91% (05-29-18 @ 08:00) (90% - 98%)  Wt(kg): --  I&O's Summary    28 May 2018 07:01  -  29 May 2018 07:00  --------------------------------------------------------  IN: 1746 mL / OUT: 1550 mL / NET: 196 mL          PHYSICAL EXAM    Appearance: Normal	  HEENT:   Normal oral mucosa, PERRL, EOMI	  NECK: Soft and supple, No LAD, No JVD  Cardiovascular: Irregular Rate and Rhythm, Normal S1 S2, 2/6 holosystolic murmur, No clicks, gallops or rubs  Respiratory: Lungs clear to auscultation	  Gastrointestinal:  Soft, Non-tender, + BS	  Skin: No rashes, No ecchymoses, No cyanosis  Neurologic: Non-focal  Extremities: No clubbing, cyanosis or edema  Vascular: Peripheral pulses palpable 2+ bilaterally    TELEMETRY: Atrial fibrillation with moderate ventricular response 	    	  RADIOLOGY  DIAGNOSTIC TESTING:  [X ] Echocardiogram: < from: TTE with Doppler (w/Cont) (05.28.18 @ 08:43) >  CONCLUSIONS:  1. Mildly dilated left atrium.  LA volume index = 40 cc/m2.  2. Eccentric left ventricular hypertrophy (dilated left  ventricle with normal relative wall thickness).  3. Peak left ventricular outflow tract gradient equals 3.2  mm Hg, mean gradient is equal to 1 mm Hg, LVOT velocity  time integral equals 16 cm. No left ventricular thrombus.  Septal motion consistent with right ventricular overload.  4. Severe right atrial enlargement.   A device wire is  noted in the right heart.  5. Normal tricuspid valve.  Severe tricuspid regurgitation.  6. Estimated pulmonary artery systolic pressure equals 56  mm Hg, assuming right atrial pressure equals 10  mm Hg,  consistent with moderate pulmonary hypertension.  *** Compared with echocardiogram of 10/18/2017, no  significant changes noted.    < end of copied text >  EF is 15%  	    LABS:	 	                    11.3   6.79  )-----------( 97       ( 29 May 2018 04:00 )             37.5     05-29    133<L>  |  93<L>  |  34<H>  ----------------------------<  130<H>  4.3   |  27  |  4.51<H>    Ca    8.7      29 May 2018 04:00  Phos  3.1     05-29  Mg     2.1     05-29    TPro  8.2  /  Alb  3.3  /  TBili  0.5  /  DBili  x   /  AST  29  /  ALT  28  /  AlkPhos  211<H>  05-28

## 2018-05-29 NOTE — DISCHARGE NOTE ADULT - CARE PLAN
Principal Discharge DX:	Acute on chronic systolic congestive heart failure  Secondary Diagnosis:	ESRD (end stage renal disease) on dialysis

## 2018-05-29 NOTE — CONSULT NOTE ADULT - ASSESSMENT
64 y/o male w/ PMHX of MI in past (Per patient, [facility]) no stents, HFrEF (LVEF 15%, LVIDd 6.0 cm, w/ AICD) severe TR, on chronic home dobutamine 7.5 mcg/kg/min,  ESRD on HD (3-4 x a week), HLD, DM II, a.fib on coumadin, interstitial pulmonary lung disease on chronic home O2, hypotension on midodrine comes in with epistaxis. Epistaxis is now resolved. He was found to be hypervolemic, and was given UF the same night, and HD the next day. He will have HD today as well. He has been admitted 6 times this year for various reasons, but mostly for SOB.  RHC 17 on 2.5 mcg/kg/min of dobutamine 2.5 mcg/kg/min showed RA 25, PCWP 25, PA 61/29/39, PA sat 42.8%, CI 1.65, CO 3.60, PVR 3.84. Currently He is on  7.5 mcg/kg/min. He admits to SOB at rest sometimes, but mostly with minimal exertion (typically walking 10 steps), orthopnea. No CP, palpitations, dizziness or syncope. ICD does not reveal any events.

## 2018-05-29 NOTE — PROGRESS NOTE ADULT - SUBJECTIVE AND OBJECTIVE BOX
Pt seen and examined at bedsidec/o dyspnea, even at rest  had HD yeaterday but unable to lose much fluids due to hypotension  denies chest pain, no dizziness  no n/v/d      Allergies:  No Known Allergies    Hospital Medications:   MEDICATIONS  (STANDING):  amiodarone    Tablet 100 milliGRAM(s) Oral daily  aspirin enteric coated 81 milliGRAM(s) Oral daily  atorvastatin 80 milliGRAM(s) Oral at bedtime  bisacodyl 5 milliGRAM(s) Oral once  chlorhexidine 4% Liquid 1 Application(s) Topical <User Schedule>  DOBUTamine Infusion 7.5 MICROgram(s)/kG/Min (16.875 mL/Hr) IV Continuous <Continuous>  finasteride 5 milliGRAM(s) Oral daily  levothyroxine 75 MICROGram(s) Oral daily  sevelamer hydrochloride 1600 milliGRAM(s) Oral <User Schedule>  sodium chloride 0.65% Nasal 1 Spray(s) Both Nostrils four times a day       VITALS:  T(F): 98 (05-29-18 @ 08:36), Max: 98.2 (05-28-18 @ 20:00)  HR: 67 (05-29-18 @ 10:00)  BP: 105/50 (05-29-18 @ 10:00)  RR: 24 (05-29-18 @ 10:00)  SpO2: 95% (05-29-18 @ 10:00)  Wt(kg): --    05-28 @ 07:01  -  05-29 @ 07:00  --------------------------------------------------------  IN: 1746 mL / OUT: 1550 mL / NET: 196 mL        PHYSICAL EXAM:  Constitutional: NAD, slightly tacypneic despite nasal O2  HEENT: anicteric sclera, oropharynx clear, MMM  Neck: inc  JVD  Respiratory: diffuse fine crepts both bases, up to lower 2/3rd  Cardiovascular: S1, S2, irreg. distant heart sounds  Gastrointestinal: BS+, soft, NT/ND  Extremities: No cyanosis or clubbing. chronic leg edema, dark pigmentation  Neurological: A/O x 3, no focal deficits  Psychiatric: Normal mood, normal affect  : No CVA tenderness. No rosa.   Skin: No rashes  Vascular Access:  prmacath    LABS:  05-29    133<L>  |  93<L>  |  34<H>  ----------------------------<  130<H>  4.3   |  27  |  4.51<H>    Ca    8.7      29 May 2018 04:00  Phos  3.1     05-29  Mg     2.1     05-29    TPro  8.2  /  Alb  3.3  /  TBili  0.5  /  DBili      /  AST  29  /  ALT  28  /  AlkPhos  211<H>  05-28    Creatinine Trend: 4.51 <--, 5.62 <--, 4.88 <--                        11.3   6.79  )-----------( 97       ( 29 May 2018 04:00 )             37.5     Urine Studies:      RADIOLOGY & ADDITIONAL STUDIES:

## 2018-05-29 NOTE — DISCHARGE NOTE ADULT - CARE PROVIDER_API CALL
Thaddeus Davis (MD), Cardiovascular Disease; Internal Medicine  [address] [facility] [address]  [address]  [place], [place] [id number]  Phone: [phone]  Fax: [phone]    Christopher Barrios (MD), Internal Medicine; Nephrology  [address] [facility] [address]  [place] [address]  Phone: [phone]  Fax: [phone] Thaddeus Davis (MD), Cardiovascular Disease; Internal Medicine  [address] [facility]  [address]  [place], [place] [address]  Phone: [phone]  Fax: [phone]    Christopher Barrios (MD), Internal Medicine; Nephrology  [address] [facility] [address]  [place] [address]  Phone: [phone]  Fax: [phone]    Kolton Farr  [facility]  [address] [place] [address]  Phone: [phone]  Fax: [phone]

## 2018-05-29 NOTE — CONSULT NOTE ADULT - ASSESSMENT
63 year old man with ESRD (HD MWF), HF with EF 15% on dobutamine,COPD on home O2 (4-5L), pulmonary fibrosis, Pulmonary hypotension, SOB, debility and encounter for palliative care.

## 2018-05-29 NOTE — CONSULT NOTE ADULT - PROBLEM SELECTOR RECOMMENDATION 9
-Pt remains short of breath, despite fluid removal via PUF/ HD.   -Would continue w/ Dobutamine 7.5 mcg/kg/min. No BB.   -Continue with midodrine for SBP augmentation for HD. Unable to tolerate vasodilators.   -He has not been an advanced HF therapy (LVAD/transplant) candidate in past due to severe interstitial lung disease.   -Will discuss case w/ Dr. Blankenship.
Volume Status : high, shortness of breath with hypotension  needs Ultrafiltration on Dialysis today,   get 2 hr Ultrafiltration on Dialysis , fluid removal goal 2 kg as tolerated with blood pressure   will need iv pressor to remove fluid,  the pt being transfer to ccu, will need bedside hemodialysis  consent for hemodialysis obtained and left in chart- witness by rn on 7th floor
secondary to pulmonary edema as well as pulmonary fibrosis: His major contributor to his SOB is fluid overlaod: pulmonary edema: He is already on dobutamine
Patient with EF of 15%. On chronic Dobutime. Continue care as per cardiology team.

## 2018-05-29 NOTE — CONSULT NOTE ADULT - SUBJECTIVE AND OBJECTIVE BOX
HPI:  63 year old man with severe CHF, on chronic dobutamine gtt at home x 3 years (follows up with Dr. Davis), AFib on coumadin, AICD, ESRD on HD MWF (plus add'l session on Saturday prn), COPD (on home O2), pulmonary fibrosis, presents to the ED with chief complaint of epistaxis that started yesterday. Patient reports intermittent nose bleed that responded to direct pressure. While hospitalized patient became hypotensive but asymptomatic and tranferred to the CCU for closer monitoring during HD and possible adjustment of Dobutamine.       ERTINENT PMH REVIEWED:  [x ] YES [ ] NO           SOCIAL HISTORY:  Significant other/partner:  [x ] YES  [ ] NO            Children:  [ x] YES  [ ] NO                   Restorationist/Spirituality: Anabaptism  Substance hx:  [ ] YES   [ x] NO           Tobacco hx:  [x ] YES  [ ] NO             Alcohol hx: [ ] YES  [x ] NO        Home Opioid hx:  [ ] YES  [ x] NO   Living Situation: [x ] Home  [ ] Long term care  [ ] Rehab    FAMILY HISTORY:  No pertinent family history in first degree relatives    [x ] Family history non contributory     BASELINE ADLs (prior to admission):  Independent [ ] moderately [ ] fully   Dependent   [x ] moderately [ ] fully    ADVANCE DIRECTIVES:  [ ] YES [x ] NO   DNR [ ] YES [ x] NO                      MOLST  [ ] YES [ x] NO    Living Will  [ ] YES [x ] NO    Health Care Proxy [ ] YES  [x ] NO      [ x] Surrogate  [ ] HCP  [ ] Guardian:    Wife                                                              Phone#:    Allergies    No Known Allergies    Intolerances        MEDICATIONS  (STANDING):  amiodarone    Tablet 100 milliGRAM(s) Oral daily  aspirin enteric coated 81 milliGRAM(s) Oral daily  atorvastatin 80 milliGRAM(s) Oral at bedtime  bisacodyl 5 milliGRAM(s) Oral once  chlorhexidine 4% Liquid 1 Application(s) Topical <User Schedule>  DOBUTamine Infusion 7.5 MICROgram(s)/kG/Min (16.875 mL/Hr) IV Continuous <Continuous>  finasteride 5 milliGRAM(s) Oral daily  levothyroxine 75 MICROGram(s) Oral daily  sevelamer hydrochloride 1600 milliGRAM(s) Oral <User Schedule>  sodium chloride 0.65% Nasal 1 Spray(s) Both Nostrils four times a day    MEDICATIONS  (PRN):  ALBUTerol/ipratropium for Nebulization 3 milliLiter(s) Nebulizer every 6 hours PRN Shortness of Breath and/or Wheezing  docusate sodium 100 milliGRAM(s) Oral two times a day PRN Constipation  senna 2 Tablet(s) Oral at bedtime PRN Constipation      PRESENT SYMPTOMS:  Source: [ ] Patient   [ ] Family   [ ] Team     Pain: [ ] YES [ ] NO  Onset:                    Location:                          Duration:                 Character:            Aggravating factors:                        Relieving factors:    Radiation:              Timing:                             Severity:      Dyspnea: [ ] YES [ ] NO - Mild [ ]  Moderate [ ]  Severe [ ]    Anxiety: [ ] YES [ ] NO  Fatigue: [ ] YES [ ] NO   Nausea: [ ] YES [ ] NO  Loss of appetite: [ ] YES [ ] NO   Constipation: [ ] YES [ ] NO     Other Symptoms:  [ ] All other review of systems negative   [ ] Unable to obtain due to poor mentation     Does patient meet criteria for Severe Protein Calorie Malnutrition?  Yes [ ]  No [ ]  PPSV 30% or below [ ]  Anasarca [ ]  Albumin < 2 [ ] Catabolic State [ ] Poor nutritional intake [ ] Significant weight loss [ ]      Palliative Performance Status Version 2:         %  ECOG -        Vital Signs Last 24 Hrs  T(C): 36.7 (29 May 2018 08:36), Max: 36.8 (28 May 2018 20:00)  T(F): 98 (29 May 2018 08:36), Max: 98.2 (28 May 2018 20:00)  HR: 67 (29 May 2018 10:00) (67 - 98)  BP: 105/50 (29 May 2018 10:00) (86/53 - 120/59)  BP(mean): 62 (29 May 2018 10:00) (59 - 79)  RR: 24 (29 May 2018 10:00) (12 - 27)  SpO2: 95% (29 May 2018 10:00) (90% - 98%)    Physical Exam:    General: [ ] Alert,  A&O x     [ ] lethargic   [ ] Agitated   [ ] Cachexia   HEENT: [ ] Normal   [ ] Dry mouth   [ ] ET Tube    [ ] Trach   Lungs: [ ] Clear [ ] Rhonchi  [ ] Crackles [ ] Wheezing [ ] Tachypnea  [ ] Audible excessive secretions    Cardiovascular:  [ ] Regular rate and rhythm  [ ] Irregular [ ] Tachycardia   [ ] Bradycardia   Abdomen: [ ] Soft  [ ] Distended  [ ] +BS  [ ] Non tender [ ] Tender  [ ]PEG   [ ] NGT   Last BM:     Genitourinary: [ ] Normal [ ] Incontinent   [ ] Oliguria/Anuria   [ ] Bellamy  Musculoskeletal:  [ ] Normal   [ ] Generalized weakness  [ ] Bedbound  [ ] Edema   Neurological: [ ] No focal deficits  [ ] Cognitive impairment     Skin: [ ] Normal   [ ] Pressure ulcers     LABS:                        11.3   6.79  )-----------( 97       ( 29 May 2018 04:00 )             37.5     05-29    133<L>  |  93<L>  |  34<H>  ----------------------------<  130<H>  4.3   |  27  |  4.51<H>    Ca    8.7      29 May 2018 04:00  Phos  3.1     05-29  Mg     2.1     05-29    TPro  8.2  /  Alb  3.3  /  TBili  0.5  /  DBili  x   /  AST  29  /  ALT  28  /  AlkPhos  211<H>  05-28    PT/INR - ( 29 May 2018 04:00 )   PT: 16.2 SEC;   INR: 1.45          PTT - ( 27 May 2018 11:48 )  PTT:45.4 SEC    I&O's Summary    28 May 2018 07:01  -  29 May 2018 07:00  --------------------------------------------------------  IN: 1746 mL / OUT: 1550 mL / NET: 196 mL        RADIOLOGY & ADDITIONAL STUDIES:    Referrals:  Hospice [ ]   Chaplaincy [ ]    Child Life [ ]   Social Work [ ]   Case Management [ ]   Holistic Therapy [ ] HPI:  63 year old man with severe CHF, on chronic dobutamine gtt at home x 3 years (follows up with Dr. Davis), AFib on coumadin, AICD, ESRD on HD MWF (plus add'l session on Saturday prn), COPD (on home O2), pulmonary fibrosis, presents to the ED with chief complaint of epistaxis that started yesterday. Patient reports intermittent nose bleed that responded to direct pressure. While hospitalized patient became hypotensive but asymptomatic and tranferred to the CCU for closer monitoring during HD and possible adjustment of Dobutamine.  Patient examined while resting in bed. He appears to be mildly short of breath. Remains full code.       ERTINENT PMH REVIEWED:  [x ] YES [ ] NO           SOCIAL HISTORY:  Significant other/partner:  [x ] YES  [ ] NO            Children:  [ x] YES  [ ] NO                   Jain/Spirituality: Muslim  Substance hx:  [ ] YES   [ x] NO           Tobacco hx:  [x ] YES  [ ] NO             Alcohol hx: [ ] YES  [x ] NO        Home Opioid hx:  [ ] YES  [ x] NO   Living Situation: [x ] Home  [ ] Long term care  [ ] Rehab    FAMILY HISTORY:  No pertinent family history in first degree relatives    [x ] Family history non contributory     BASELINE ADLs (prior to admission):  Independent [ ] moderately [ ] fully   Dependent   [x ] moderately [ ] fully    ADVANCE DIRECTIVES:  [ ] YES [x ] NO   DNR [ ] YES [ x] NO                      MOLST  [ ] YES [ x] NO    Living Will  [ ] YES [x ] NO    Health Care Proxy [ ] YES  [x ] NO      [ x] Surrogate  [ ] HCP  [ ] Guardian:  Brittneynashailyn mustafa                                      Phone#: [phone]    Allergies    No Known Allergies    Intolerances        MEDICATIONS  (STANDING):  amiodarone    Tablet 100 milliGRAM(s) Oral daily  aspirin enteric coated 81 milliGRAM(s) Oral daily  atorvastatin 80 milliGRAM(s) Oral at bedtime  bisacodyl 5 milliGRAM(s) Oral once  chlorhexidine 4% Liquid 1 Application(s) Topical <User Schedule>  DOBUTamine Infusion 7.5 MICROgram(s)/kG/Min (16.875 mL/Hr) IV Continuous <Continuous>  finasteride 5 milliGRAM(s) Oral daily  levothyroxine 75 MICROGram(s) Oral daily  sevelamer hydrochloride 1600 milliGRAM(s) Oral <User Schedule>  sodium chloride 0.65% Nasal 1 Spray(s) Both Nostrils four times a day    MEDICATIONS  (PRN):  ALBUTerol/ipratropium for Nebulization 3 milliLiter(s) Nebulizer every 6 hours PRN Shortness of Breath and/or Wheezing  docusate sodium 100 milliGRAM(s) Oral two times a day PRN Constipation  senna 2 Tablet(s) Oral at bedtime PRN Constipation      PRESENT SYMPTOMS:  Source: [x ] Patient   [ ] Family   [ ] Team     Pain: [ ] YES [z ] NO  Onset:                    Location:                          Duration:                 Character:            Aggravating factors:                        Relieving factors:    Radiation:              Timing:                             Severity:      Dyspnea: [ x] YES [ ] NO - Mild [x]  Moderate [ ]  Severe [ ]    Anxiety: [ ] YES [x ] NO  Fatigue: [ x] YES [ ] NO   Nausea: [ ] YES [x ] NO  Loss of appetite: [ ] YES [x ] NO   Constipation: [ ] YES [x ] NO     Other Symptoms:  [x ] All other review of systems negative   [ ] Unable to obtain due to poor mentation     Does patient meet criteria for Severe Protein Calorie Malnutrition?  Yes [ ]  No [ ]  PPSV 30% or below [ ]  Anasarca [ ]  Albumin < 2 [ ] Catabolic State [ ] Poor nutritional intake [ ] Significant weight loss [ ]      Palliative Performance Status Version 2:        60 %    Vital Signs Last 24 Hrs  T(C): 36.7 (29 May 2018 08:36), Max: 36.8 (28 May 2018 20:00)  T(F): 98 (29 May 2018 08:36), Max: 98.2 (28 May 2018 20:00)  HR: 67 (29 May 2018 10:00) (67 - 98)  BP: 105/50 (29 May 2018 10:00) (86/53 - 120/59)  BP(mean): 62 (29 May 2018 10:00) (59 - 79)  RR: 24 (29 May 2018 10:00) (12 - 27)  SpO2: 95% (29 May 2018 10:00) (90% - 98%)    Physical Exam:    General: [x ] Alert,  A&O x3     [ ] lethargic   [ ] Agitated   [ ] Cachexia   HEENT: [ ] Normal   [x ] Dry mouth   [ ] ET Tube    [ ] Trach   Lungs: [ ] Clear [ ] Rhonchi  [x ] Crackles [ ] Wheezing [ ] Tachypnea  [ ] Audible excessive secretions    Cardiovascular:  [ x] Regular rate and rhythm  [ ] Irregular [ ] Tachycardia   [ ] Bradycardia   Abdomen: [ x] Soft  [ ] Distended  [ ] +BS  [x ] Non tender [ ] Tender  [ ]PEG   [ ] NGT   Last BM:     Genitourinary: [ ] Normal [ ] Incontinent   [ ] Oliguria/Anuria   [ ] Bellamy  Musculoskeletal:  [ ] Normal   [x ] Generalized weakness  [ ] Bedbound  [x ] Edema   Neurological: [x ] No focal deficits  [ ] Cognitive impairment     Skin: [ x] Normal   [ ] Pressure ulcers     LABS:                        11.3   6.79  )-----------( 97       ( 29 May 2018 04:00 )             37.5     05-29    133<L>  |  93<L>  |  34<H>  ----------------------------<  130<H>  4.3   |  27  |  4.51<H>    Ca    8.7      29 May 2018 04:00  Phos  3.1     05-29  Mg     2.1     05-29    TPro  8.2  /  Alb  3.3  /  TBili  0.5  /  DBili  x   /  AST  29  /  ALT  28  /  AlkPhos  211<H>  05-28    PT/INR - ( 29 May 2018 04:00 )   PT: 16.2 SEC;   INR: 1.45          PTT - ( 27 May 2018 11:48 )  PTT:45.4 SEC    I&O's Summary    28 May 2018 07:01  -  29 May 2018 07:00  --------------------------------------------------------  IN: 1746 mL / OUT: 1550 mL / NET: 196 mL        RADIOLOGY & ADDITIONAL STUDIES:  < from: TTE with Doppler (w/Cont) (05.28.18 @ 08:43) >  CONCLUSIONS:  1. Mildly dilated left atrium.  LA volume index = 40 cc/m2.  2. Eccentric left ventricular hypertrophy (dilated left  ventricle with normal relative wall thickness).  3. Peak left ventricular outflow tract gradient equals 3.2  mm Hg, mean gradient is equal to 1 mm Hg, LVOT velocity  time integral equals 16 cm. No left ventricular thrombus.  Septal motion consistent with right ventricular overload.  4. Severe right atrial enlargement.   A device wire is  noted in the right heart.  5. Normal tricuspid valve.  Severe tricuspid regurgitation.  6. Estimated pulmonary artery systolic pressure equals 56  mm Hg, assuming right atrial pressure equals 10  mm Hg,  consistent with moderate pulmonary hypertension.  *** Compared with echocardiogram of 10/18/2017, no  significant changes noted.  ------------------------------------------------------------------------    Referrals:  Hospice [ ]   Chaplaincy [ ]    Child Life [ ]   Social Work [ ]   Case Management [ ]   Holistic Therapy [ ]

## 2018-05-29 NOTE — CONSULT NOTE ADULT - ATTENDING COMMENTS
the family members frequently insists on giving him steroids for pulmonary fibrosis: He was tried on steroids on last admission for a few days with no benefit and hence was dced: it will do more harm then good because of potential to retain salt and water with steroids and his poor EF:

## 2018-05-29 NOTE — PROGRESS NOTE ADULT - ASSESSMENT
63 year old man with ESRD (HD MWF), NICM (EF 21%) s/p ICD, PICC line in place with home dobutamine drip, atrial fibrillation on coumadin, COPD on home O2 (4-5L), pulmonary fibrosis with increasing shortness of breath and severe epistaxis.  No more epistaxis.  Continue dobutamine.  Echo results unchanged from previous study.  EF is 15%.  Recommend CHF consult and pulmonary consult.

## 2018-05-29 NOTE — CONSULT NOTE ADULT - PROBLEM SELECTOR RECOMMENDATION 3
on HD
Most likely secondary to ESRD and CHF. Would benefit from low dose Dilaudid if still having SOB once fluid is removed. Though patient does not want to try any opioids for SOB. Educated patient on use of opioids, patient continued to refuse.

## 2018-05-29 NOTE — PROGRESS NOTE ADULT - PROBLEM SELECTOR PLAN 2
Patient with EF 15% on 5/28 TTE, down from 21% on 10/17 TTE. AICD in place. On dobutamine gtt x3yrs. S/p midodrine in the ED, no need for IV pressors in CCU.  - C/w dobutamine 7.5 mcg/kg/min  - Re-start home midodrine 10mg TID  - BP in lower extremities is significantly higher than in upper extremities, consistent w prior CCU admission; we think that LE BP is likely more accurate than UE BP.  - appreciate Heart-Failure recs  - appreciate EP recs (to interrogate [employer] AICD)

## 2018-05-29 NOTE — CONSULT NOTE ADULT - PROBLEM SELECTOR RECOMMENDATION 4
pt carries a diagnosis of COPD but has pulmonary fibrosis: I have had many discussions with his wife  and sisters on previous admissions:
PPS 60%. Skin care PRN. Assist with ADL's PRN.

## 2018-05-29 NOTE — PROGRESS NOTE ADULT - PROBLEM SELECTOR PLAN 1
Due to volume overload, likely multifactorial 2/2 ESRD, CHF. Also could be c/b pulmonary fibrosis. Pt already s/p 4 doses of HD in wk prior to admission  - C/w supplemental O2 goal Sat >90%, CHF, Pulm fibrosis mgmt as below.

## 2018-05-29 NOTE — CONSULT NOTE ADULT - PROBLEM SELECTOR PROBLEM 3
ESRD (end stage renal disease) on dialysis
ESRD (end stage renal disease) on dialysis
SOB (shortness of breath)

## 2018-05-29 NOTE — PROGRESS NOTE ADULT - ASSESSMENT
63yM w severe HFrEF (on dobutamine gtt x3 yrs) w [employer] AICD, A-fib (on warfarin), ESRD on HD MWF (+Sat PRN), pulmonary fibrosis, on 5/27 p/w epistaxis x1 day, also w acute-on-chronic SOB, found to be volume-overloaded despite reported med compliance, no dietary changes, and having gotten extra session of HD the day prior to admission. SBP 80s-90s in UEs, so to be monitored while undergoing HD was transferred to CCU.

## 2018-05-29 NOTE — CONSULT NOTE ADULT - PROBLEM SELECTOR RECOMMENDATION 5
cont hira e meds
Discussion had with patient about advance care planning, he stated he wants to live and does not want to discuss it further. Education provided to patient about overall poor prognosis, but patient continues to want to be full code. Goals established. Palliative will sign off at this time. Please reconsult as needed.

## 2018-05-29 NOTE — CONSULT NOTE ADULT - ATTENDING COMMENTS
Pt seen with NP.  Agree with above. ES CHF well known to our service.  Pt's goals remain consistent.  Will sign off.  Please call back if goals or symptoms change

## 2018-05-29 NOTE — PROGRESS NOTE ADULT - SUBJECTIVE AND OBJECTIVE BOX
Note Incomplete  --- Pending Attending Rounds    Briefly: 63yM w bad HFrEF (chronic  gtt) w AICD, Afib (warfarin), pulm fibrosis (home O2), ESRD on HD, p/w epistaxis and acute-on-chronic SOB    Interval Events: Multiple runs of VT up to ~15"    Subjective:     Physical:  ICU Vital Signs Last 24 Hrs  T(C): 36.3 (29 May 2018 04:00), Max: 36.8 (28 May 2018 20:00)  T(F): 97.4 (29 May 2018 04:00), Max: 98.2 (28 May 2018 20:00)  HR: 71 (29 May 2018 07:00) (65 - 98)  BP: 93/57 (29 May 2018 07:00) (82/53 - 120/59)  BP(mean): 64 (29 May 2018 07:00) (59 - 79)  ABP: --  ABP(mean): --  RR: 16 (29 May 2018 07:00) (12 - 27)  SpO2: 92% (29 May 2018 07:00) (90% - 98%)      Telemetry:   EKG:    Exam:      LABS:  CAPILLARY BLOOD GLUCOSE      POCT Blood Glucose.: 272 mg/dL (28 May 2018 18:16)  POCT Blood Glucose.: 142 mg/dL (28 May 2018 12:25)    I&O's Summary    28 May 2018 07:01  -  29 May 2018 07:00  --------------------------------------------------------  IN: 1746 mL / OUT: 1550 mL / NET: 196 mL                              11.3   6.79  )-----------( 97       ( 29 May 2018 04:00 )             37.5     WBC Trend: 6.79<--, 6.45<--, 5.87<--  05-29    133<L>  |  93<L>  |  34<H>  ----------------------------<  130<H>  4.3   |  27  |  4.51<H>    Ca    8.7      29 May 2018 04:00  Phos  3.1       Mg     2.1         TPro  8.2  /  Alb  3.3  /  TBili  0.5  /  DBili  x   /  AST  29  /  ALT  28  /  AlkPhos  211<H>      Creatinine Trend: 4.51<--, 5.62<--, 4.88<--  PT/INR - ( 29 May 2018 04:00 )   PT: 16.2 SEC;   INR: 1.45          PTT - ( 27 May 2018 11:48 )  PTT:45.4 SEC          Imaging:        MEDICATIONS  (STANDING):  amiodarone    Tablet 100 milliGRAM(s) Oral daily  aspirin enteric coated 81 milliGRAM(s) Oral daily  atorvastatin 80 milliGRAM(s) Oral at bedtime  chlorhexidine 4% Liquid 1 Application(s) Topical <User Schedule>  DOBUTamine Infusion 7.5 MICROgram(s)/kG/Min (16.875 mL/Hr) IV Continuous <Continuous>  finasteride 5 milliGRAM(s) Oral daily  levothyroxine 75 MICROGram(s) Oral daily  sevelamer hydrochloride 1600 milliGRAM(s) Oral <User Schedule>    MEDICATIONS  (PRN):  ALBUTerol/ipratropium for Nebulization 3 milliLiter(s) Nebulizer every 6 hours PRN Shortness of Breath and/or Wheezing  docusate sodium 100 milliGRAM(s) Oral two times a day PRN Constipation  senna 2 Tablet(s) Oral at bedtime PRN Constipation      Consult Recommendations: Note Complete  Briefly: 63yM w bad HFrEF (chronic  gtt) w AICD, Afib (warfarin), pulm fibrosis (home O2), ESRD on HD, p/w epistaxis and acute-on-chronic SOB    Interval Events: Multiple runs of VT up to ~15"    Add'l PMHx: Pt says that he has never been given a diagnosis of COPD, emphysema, or chronic bronchitis    Subjective:   GEN no fever, no chills  RESP ongoing dyspnea, sometimes comfortable at rest but exacerbated by minimal activity. some non-productive cough, not new for him. No wheeze  CV no chest pain, no palpitations, no dizziness, no lightheadedness  GI LBM last night, still feels constipated. No nausea, no vomiting.    DERM says that R chest-wall Perma-cath site feels as though it is burning, on outside, not on inside    Physical:  ICU Vital Signs Last 24 Hrs  T(C): 36.3 (29 May 2018 04:00), Max: 36.8 (28 May 2018 20:00)  T(F): 97.4 (29 May 2018 04:00), Max: 98.2 (28 May 2018 20:00)  HR: 71 (29 May 2018 07:00) (65 - 98)  BP: 93/57 (29 May 2018 07:00) (82/53 - 120/59)  BP(mean): 64 (29 May 2018 07:00) (59 - 79)  RR: 16 (29 May 2018 07:00) (12 - 27)  SpO2: 92% (29 May 2018 07:00) (90% - 98%)      Telemetry: A-fib, PVCs, runs of NSVT (longest ~13" this AM at ~07:30), pauses up to 1.2 seconds    EKG :   A-fib, HR 73,  (per automated read), QTc 492 (per automated read)  Extremity leads w low voltage, lead I w RsR'   Q in V1 (~4mm). pRWP  No significant ST elevations or depressions  T waves v low voltage throughout extremity leads      Exam:  GEN mildly anxious-appearing, non-toxic, SpO2 in 90s on 3L NC  NECK JVD ~8cm above top of sternum  RESP Posterior basilar crackles to mid-lung on L, crackles throughout R posterior lung field. Anterior crackles on L. High-pitched inspiratory sound heard R base.   CV irreg irreg, s1 s2  EXT Feet cool b/l but DP pulses palpable b/l. Pitting edema 1+ b/l    LABS:  CAPILLARY BLOOD GLUCOSE      POCT Blood Glucose.: 272 mg/dL (28 May 2018 18:16)  POCT Blood Glucose.: 142 mg/dL (28 May 2018 12:25)    I&O's Summary    28 May 2018 07:01  -  29 May 2018 07:00  --------------------------------------------------------  IN: 1746 mL / OUT: 1550 mL / NET: 196 mL                              11.3   6.79  )-----------( 97       ( 29 May 2018 04:00 )             37.5     WBC Trend: 6.79<--, 6.45<--, 5.87<--      133<L>  |  93<L>  |  34<H>  ----------------------------<  130<H>  4.3   |  27  |  4.51<H>    Ca    8.7      29 May 2018 04:00  Phos  3.1       Mg     2.1         TPro  8.2  /  Alb  3.3  /  TBili  0.5  /  DBili  x   /  AST  29  /  ALT  28  /  AlkPhos  211<H>      Creatinine Trend: 4.51<--, 5.62<--, 4.88<--    PT/INR - ( 29 May 2018 04:00 )   PT: 16.2 SEC;   INR: 1.45       PTT - ( 27 May 2018 11:48 )  PTT:45.4 SEC          Imaging:        MEDICATIONS  (STANDING):  amiodarone    Tablet 100 milliGRAM(s) Oral daily  aspirin enteric coated 81 milliGRAM(s) Oral daily  atorvastatin 80 milliGRAM(s) Oral at bedtime  chlorhexidine 4% Liquid 1 Application(s) Topical <User Schedule>  DOBUTamine Infusion 7.5 MICROgram(s)/kG/Min (16.875 mL/Hr) IV Continuous <Continuous>  finasteride 5 milliGRAM(s) Oral daily  levothyroxine 75 MICROGram(s) Oral daily  sevelamer hydrochloride 1600 milliGRAM(s) Oral <User Schedule>    MEDICATIONS  (PRN):  ALBUTerol/ipratropium for Nebulization 3 milliLiter(s) Nebulizer every 6 hours PRN Shortness of Breath and/or Wheezing  docusate sodium 100 milliGRAM(s) Oral two times a day PRN Constipation  senna 2 Tablet(s) Oral at bedtime PRN Constipation      Consult Recommendations:  - Nephro :   --- ultrafiltration today  - Pall Care   --- pt not interested in opioids for dyspnea, remains emphatically full code; pall care signed off  - Heart Failure   --- consulted today, recs pending  - Pulm   --- Pt's lung disease is likely all Pulm Fibrosis, no COPD. Further recs pending  - EP   --- consulted today to interrogate AICD, recs pending  - PT   --- consulted today, recs pending Note Complete  Briefly: 63yM w bad HFrEF (chronic  gtt) w AICD, Afib (warfarin), pulm fibrosis (home O2), ESRD on HD, p/w epistaxis and acute-on-chronic SOB    Interval Events: Multiple runs of VT up to ~15"    Add'l PMHx: Pt says that he has never been given a diagnosis of COPD, emphysema, or chronic bronchitis    Subjective:   GEN no fever, no chills  RESP ongoing dyspnea, sometimes comfortable at rest but exacerbated by minimal activity. some non-productive cough, not new for him. No wheeze  CV no chest pain, no palpitations, no dizziness, no lightheadedness  GI LBM last night, still feels constipated. No nausea, no vomiting.    DERM says that R chest-wall Perma-cath site feels as though it is burning, on outside, not on inside    Physical:  ICU Vital Signs Last 24 Hrs  T(C): 36.3 (29 May 2018 04:00), Max: 36.8 (28 May 2018 20:00)  T(F): 97.4 (29 May 2018 04:00), Max: 98.2 (28 May 2018 20:00)  HR: 71 (29 May 2018 07:00) (65 - 98)  BP: 93/57 (29 May 2018 07:00) (82/53 - 120/59)  BP(mean): 64 (29 May 2018 07:00) (59 - 79)  RR: 16 (29 May 2018 07:00) (12 - 27)  SpO2: 92% (29 May 2018 07:00) (90% - 98%)      Telemetry: A-fib, PVCs, runs of NSVT (longest ~13" this AM at ~07:30), pauses up to 1.2 seconds    EKG :   A-fib, HR 73,  (per automated read), QTc 492 (per automated read)  Extremity leads w low voltage, lead I w RsR'   Q in V1 (~4mm). pRWP  No significant ST elevations or depressions  T waves v low voltage throughout extremity leads      Exam:  GEN mildly anxious-appearing, non-toxic, SpO2 in 90s on 3L NC  NECK JVD ~8cm above top of sternum  CHEST Perma-cath site on R chest wall w clear bandage clean, dry, intact. Non-tender, non-erythematous, not swollen, not hot, not indurated.  RESP Posterior basilar crackles to mid-lung on L, crackles throughout R posterior lung field. Anterior crackles on L. High-pitched inspiratory sound heard R base.   CV irreg irreg, s1 s2  EXT Feet cool b/l but DP pulses palpable b/l. Pitting edema 1+ b/l. LUE PICC/mid-line    LABS:  CAPILLARY BLOOD GLUCOSE      POCT Blood Glucose.: 272 mg/dL (28 May 2018 18:16)  POCT Blood Glucose.: 142 mg/dL (28 May 2018 12:25)    I&O's Summary    28 May 2018 07:01  -  29 May 2018 07:00  --------------------------------------------------------  IN: 1746 mL / OUT: 1550 mL / NET: 196 mL                              11.3   6.79  )-----------( 97       ( 29 May 2018 04:00 )             37.5     WBC Trend: 6.79<--, 6.45<--, 5.87<--      133<L>  |  93<L>  |  34<H>  ----------------------------<  130<H>  4.3   |  27  |  4.51<H>    Ca    8.7      29 May 2018 04:00  Phos  3.1       Mg     2.1         TPro  8.2  /  Alb  3.3  /  TBili  0.5  /  DBili  x   /  AST  29  /  ALT  28  /  AlkPhos  211<H>      Creatinine Trend: 4.51<--, 5.62<--, 4.88<--    PT/INR - ( 29 May 2018 04:00 )   PT: 16.2 SEC;   INR: 1.45       PTT - ( 27 May 2018 11:48 )  PTT:45.4 SEC          Imaging:        MEDICATIONS  (STANDING):  amiodarone    Tablet 100 milliGRAM(s) Oral daily  aspirin enteric coated 81 milliGRAM(s) Oral daily  atorvastatin 80 milliGRAM(s) Oral at bedtime  chlorhexidine 4% Liquid 1 Application(s) Topical <User Schedule>  DOBUTamine Infusion 7.5 MICROgram(s)/kG/Min (16.875 mL/Hr) IV Continuous <Continuous>  finasteride 5 milliGRAM(s) Oral daily  levothyroxine 75 MICROGram(s) Oral daily  sevelamer hydrochloride 1600 milliGRAM(s) Oral <User Schedule>    MEDICATIONS  (PRN):  ALBUTerol/ipratropium for Nebulization 3 milliLiter(s) Nebulizer every 6 hours PRN Shortness of Breath and/or Wheezing  docusate sodium 100 milliGRAM(s) Oral two times a day PRN Constipation  senna 2 Tablet(s) Oral at bedtime PRN Constipation      Consult Recommendations:  - Nephro :   --- ultrafiltration today  - Pall Care   --- pt not interested in opioids for dyspnea, remains emphatically full code; pall care signed off  - Heart Failure   --- consulted today, recs pending  - Pulm   --- Pt's lung disease is likely all Pulm Fibrosis, no COPD. Further recs pending  - EP   --- consulted today to interrogate AICD, recs pending  - PT   --- consulted today, recs pending

## 2018-05-29 NOTE — CHART NOTE - NSCHARTNOTEFT_GEN_A_CORE
ELECTROPHYSIOLOGY  Device Interrogation Performed                                  Date/Time: 5/29/2018  13:05  :             B[employer]ronik single chamber ICD              Model:            Iterevia                        Mode:        VVI                     Rate:     50      Ventricular Lead(s):  RV Lead: R wave amplitude:            18.5              mv          Impedence:                   Ohms      Threshold:                V@             ms       Battery Status:                     Good                   Underlying Rhythm:                         Atrial fibrillation                                   Events/Observation: No high ventricular rate events;  No high atrial rate events     Impression/Plan: p/w SOB CHF  Normal sensing and pacing via iterative testing.  Excellent threshold capture.  No reprogramming. ELECTROPHYSIOLOGY  Device Interrogation Performed                                  Date/Time: 5/29/2018  13:05  :             B[employer]roni[employer] single chamber ICD              Model:            Iterevia                        Mode:        VVI                     Rate:     50      Ventricular Lead(s):  RV Lead: R wave amplitude:            18.5              mv          Impedence:     463              Ohms      Threshold:        0.5        V@    0.4         ms       Battery Status:                     Good                   Underlying Rhythm:                         Atrial fibrillation     70                              Events/Observation: No high ventricular rate events;  No high atrial rate events     Impression/Plan: p/w SOB CHF  Normal sensing and pacing via iterative testing.  Excellent threshold capture.  No reprogramming.

## 2018-05-30 DIAGNOSIS — J84.9 INTERSTITIAL PULMONARY DISEASE, UNSPECIFIED: ICD-10-CM

## 2018-05-30 LAB
BASE EXCESS BLDA CALC-SCNC: -2 MMOL/L — SIGNIFICANT CHANGE UP
BASE EXCESS BLDA CALC-SCNC: 2.5 MMOL/L — SIGNIFICANT CHANGE UP
BASE EXCESS BLDV CALC-SCNC: 0.5 MMOL/L — SIGNIFICANT CHANGE UP
BASE EXCESS BLDV CALC-SCNC: 4 MMOL/L — SIGNIFICANT CHANGE UP
BUN SERPL-MCNC: 48 MG/DL — HIGH (ref 7–23)
CA-I BLDA-SCNC: 1.2 MMOL/L — SIGNIFICANT CHANGE UP (ref 1.15–1.29)
CALCIUM SERPL-MCNC: 8.7 MG/DL — SIGNIFICANT CHANGE UP (ref 8.4–10.5)
CHLORIDE SERPL-SCNC: 91 MMOL/L — LOW (ref 98–107)
CO2 SERPL-SCNC: 23 MMOL/L — SIGNIFICANT CHANGE UP (ref 22–31)
CREAT SERPL-MCNC: 5.53 MG/DL — HIGH (ref 0.5–1.3)
GLUCOSE BLDA-MCNC: 196 MG/DL — HIGH (ref 70–99)
GLUCOSE SERPL-MCNC: 111 MG/DL — HIGH (ref 70–99)
HCO3 BLDA-SCNC: 22 MMOL/L — SIGNIFICANT CHANGE UP (ref 22–26)
HCO3 BLDA-SCNC: 26 MMOL/L — SIGNIFICANT CHANGE UP (ref 22–26)
HCO3 BLDV-SCNC: 23 MMOL/L — SIGNIFICANT CHANGE UP (ref 20–27)
HCO3 BLDV-SCNC: 27 MMOL/L — SIGNIFICANT CHANGE UP (ref 20–27)
HCT VFR BLD CALC: 39.7 % — SIGNIFICANT CHANGE UP (ref 39–50)
HCT VFR BLDA CALC: 35.7 % — LOW (ref 39–51)
HGB BLD-MCNC: 12 G/DL — LOW (ref 13–17)
HGB BLDA-MCNC: 11.6 G/DL — LOW (ref 13–17)
INR BLD: 1.33 — HIGH (ref 0.88–1.17)
LACTATE BLDA-SCNC: 1.1 MMOL/L — SIGNIFICANT CHANGE UP (ref 0.5–2)
MAGNESIUM SERPL-MCNC: 2.2 MG/DL — SIGNIFICANT CHANGE UP (ref 1.6–2.6)
MCHC RBC-ENTMCNC: 30.2 % — LOW (ref 32–36)
MCHC RBC-ENTMCNC: 30.8 PG — SIGNIFICANT CHANGE UP (ref 27–34)
MCV RBC AUTO: 102.1 FL — HIGH (ref 80–100)
NRBC # FLD: 0 — SIGNIFICANT CHANGE UP
PCO2 BLDA: 44 MMHG — SIGNIFICANT CHANGE UP (ref 35–48)
PCO2 BLDA: 46 MMHG — SIGNIFICANT CHANGE UP (ref 35–48)
PCO2 BLDV: 53 MMHG — HIGH (ref 41–51)
PCO2 BLDV: 58 MMHG — HIGH (ref 41–51)
PH BLDA: 7.32 PH — LOW (ref 7.35–7.45)
PH BLDA: 7.4 PH — SIGNIFICANT CHANGE UP (ref 7.35–7.45)
PH BLDV: 7.28 PH — LOW (ref 7.32–7.43)
PH BLDV: 7.36 PH — SIGNIFICANT CHANGE UP (ref 7.32–7.43)
PHOSPHATE SERPL-MCNC: 4 MG/DL — SIGNIFICANT CHANGE UP (ref 2.5–4.5)
PLATELET # BLD AUTO: 92 K/UL — LOW (ref 150–400)
PMV BLD: 13 FL — SIGNIFICANT CHANGE UP (ref 7–13)
PO2 BLDA: 72 MMHG — LOW (ref 83–108)
PO2 BLDA: 85 MMHG — SIGNIFICANT CHANGE UP (ref 83–108)
PO2 BLDV: 32 MMHG — LOW (ref 35–40)
PO2 BLDV: 34 MMHG — LOW (ref 35–40)
POTASSIUM BLDA-SCNC: 4.3 MMOL/L — SIGNIFICANT CHANGE UP (ref 3.4–4.5)
POTASSIUM SERPL-MCNC: 4.4 MMOL/L — SIGNIFICANT CHANGE UP (ref 3.5–5.3)
POTASSIUM SERPL-SCNC: 4.4 MMOL/L — SIGNIFICANT CHANGE UP (ref 3.5–5.3)
PROTHROM AB SERPL-ACNC: 15.4 SEC — HIGH (ref 9.8–13.1)
RBC # BLD: 3.89 M/UL — LOW (ref 4.2–5.8)
RBC # FLD: 15.5 % — HIGH (ref 10.3–14.5)
SAO2 % BLDA: 92.3 % — LOW (ref 95–99)
SAO2 % BLDA: 96.8 % — SIGNIFICANT CHANGE UP (ref 95–99)
SAO2 % BLDV: 52.6 % — LOW (ref 60–85)
SAO2 % BLDV: 60 % — SIGNIFICANT CHANGE UP (ref 60–85)
SODIUM BLDA-SCNC: 128 MMOL/L — LOW (ref 136–146)
SODIUM SERPL-SCNC: 129 MMOL/L — LOW (ref 135–145)
WBC # BLD: 5.88 K/UL — SIGNIFICANT CHANGE UP (ref 3.8–10.5)
WBC # FLD AUTO: 5.88 K/UL — SIGNIFICANT CHANGE UP (ref 3.8–10.5)

## 2018-05-30 PROCEDURE — 93451 RIGHT HEART CATH: CPT | Mod: 26,GC

## 2018-05-30 PROCEDURE — 99233 SBSQ HOSP IP/OBS HIGH 50: CPT

## 2018-05-30 PROCEDURE — 71045 X-RAY EXAM CHEST 1 VIEW: CPT | Mod: 26

## 2018-05-30 PROCEDURE — 76937 US GUIDE VASCULAR ACCESS: CPT | Mod: 26,GC

## 2018-05-30 RX ORDER — WARFARIN SODIUM 2.5 MG/1
5 TABLET ORAL ONCE
Qty: 0 | Refills: 0 | Status: COMPLETED | OUTPATIENT
Start: 2018-05-30 | End: 2018-05-30

## 2018-05-30 RX ORDER — DOBUTAMINE HCL 250MG/20ML
2.5 VIAL (ML) INTRAVENOUS
Qty: 500 | Refills: 0 | Status: DISCONTINUED | OUTPATIENT
Start: 2018-05-30 | End: 2018-05-31

## 2018-05-30 RX ORDER — MIDODRINE HYDROCHLORIDE 2.5 MG/1
10 TABLET ORAL ONCE
Qty: 0 | Refills: 0 | Status: COMPLETED | OUTPATIENT
Start: 2018-05-30 | End: 2018-05-30

## 2018-05-30 RX ADMIN — Medication 75 MICROGRAM(S): at 05:53

## 2018-05-30 RX ADMIN — SEVELAMER CARBONATE 1600 MILLIGRAM(S): 2400 POWDER, FOR SUSPENSION ORAL at 21:34

## 2018-05-30 RX ADMIN — AMIODARONE HYDROCHLORIDE 100 MILLIGRAM(S): 400 TABLET ORAL at 05:53

## 2018-05-30 RX ADMIN — CHLORHEXIDINE GLUCONATE 1 APPLICATION(S): 213 SOLUTION TOPICAL at 11:42

## 2018-05-30 RX ADMIN — ATORVASTATIN CALCIUM 80 MILLIGRAM(S): 80 TABLET, FILM COATED ORAL at 21:19

## 2018-05-30 RX ADMIN — FINASTERIDE 5 MILLIGRAM(S): 5 TABLET, FILM COATED ORAL at 11:41

## 2018-05-30 RX ADMIN — Medication 1 SPRAY(S): at 11:41

## 2018-05-30 RX ADMIN — SEVELAMER CARBONATE 1600 MILLIGRAM(S): 2400 POWDER, FOR SUSPENSION ORAL at 11:41

## 2018-05-30 RX ADMIN — Medication 11.25 MICROGRAM(S)/KG/MIN: at 13:50

## 2018-05-30 RX ADMIN — WARFARIN SODIUM 5 MILLIGRAM(S): 2.5 TABLET ORAL at 23:28

## 2018-05-30 RX ADMIN — Medication 1 SPRAY(S): at 05:54

## 2018-05-30 RX ADMIN — MIDODRINE HYDROCHLORIDE 10 MILLIGRAM(S): 2.5 TABLET ORAL at 15:24

## 2018-05-30 RX ADMIN — Medication 11.25 MICROGRAM(S)/KG/MIN: at 20:05

## 2018-05-30 RX ADMIN — Medication 10.65 MICROGRAM(S)/KG/MIN: at 23:28

## 2018-05-30 NOTE — PROGRESS NOTE ADULT - PROBLEM SELECTOR PLAN 2
Patient with EF 15% on 5/28 TTE, down from 21% on 10/17 TTE. AICD in place. On dobutamine gtt x3yrs. S/p midodrine in the ED, no need for IV pressors in CCU. RHC 5/30 AM.   - C/w dobutamine 7.5 mcg/kg/min, wean as tolerated per HF recs  - Give midodrine 10mg one hour before HD, not otherwise   - BP in lower extremities is significantly higher than in upper extremities, consistent w prior CCU admission; we think that LE BP is likely more accurate than UE BP. F/u VA duplex of UEs  - appreciate Heart-Failure recs

## 2018-05-30 NOTE — PROGRESS NOTE ADULT - ATTENDING COMMENTS
the family members frequently insists on giving him steroids for pulmonary fibrosis: He was tried on steroids on last admission for a few days with no benefit and hence was dced: it will do more harm then good because of potential to retain salt and water with steroids and his poor EF:  5/30: to restart on bipap ( check  his home settings), for the time being, can start on bipap at 12/5 with 40% fio2:

## 2018-05-30 NOTE — PROGRESS NOTE ADULT - SUBJECTIVE AND OBJECTIVE BOX
[facility] - Mark / Khai PADRON /Jennifer/ VITO García/ VITO Leigh/ Kolton Farr / LISSA Njeru  ---------------------------------------------------------------------------------------------------------------  Patient seen and examined bedside in CCU    on &off SOB. comfortable lying falt, on NC02  had extra PUF yesterday, tolerated well    PAST MEDICAL & SURGICAL HISTORY:  Seizure: Off Keppra since 7/2017  Chronic hypotension  AF (atrial fibrillation)  COPD (chronic obstructive pulmonary disease): 4L home O2  HLD (hyperlipidemia)  DM (diabetes mellitus): Off Insulin since 7/2017  ESRD (end stage renal disease) on dialysis  BPH (benign prostatic hypertrophy)  Myocardial infarction: 10/2011  Gout  CHF (congestive heart failure)  AICD (automatic cardioverter/defibrillator) present: Biotronic - placed 9/11/09  H/O coronary angiogram      Allergies: No Known Allergies    Home Medications Reviewed  Hospital Medications:   MEDICATIONS  (STANDING):  amiodarone    Tablet 100 milliGRAM(s) Oral daily  atorvastatin 80 milliGRAM(s) Oral at bedtime  bisacodyl 5 milliGRAM(s) Oral at bedtime  chlorhexidine 4% Liquid 1 Application(s) Topical <User Schedule>  DOBUTamine Infusion 5 MICROgram(s)/kG/Min (11.25 mL/Hr) IV Continuous <Continuous>  finasteride 5 milliGRAM(s) Oral daily  levothyroxine 75 MICROGram(s) Oral daily  midodrine 10 milliGRAM(s) Oral once  senna 2 Tablet(s) Oral at bedtime  sevelamer hydrochloride 1600 milliGRAM(s) Oral <User Schedule>  sodium chloride 0.65% Nasal 1 Spray(s) Both Nostrils four times a day    SOCIAL HISTORY:  Denies ETOh,Smoking, illicit drug use  FAMILY HISTORY:  No pertinent family history in first degree relatives      VITALS:  T(F): 96.9 (05-30-18 @ 12:00), Max: 98.2 (05-29-18 @ 16:00)  HR: 79 (05-30-18 @ 13:00)  BP: 117/54 (05-30-18 @ 13:00)  RR: 18 (05-30-18 @ 12:07)  SpO2: 96% (05-30-18 @ 13:00)  Wt(kg): --    05-29 @ 07:01  -  05-30 @ 07:00  --------------------------------------------------------  IN: 1364 mL / OUT: 3800 mL / NET: -2436 mL          PHYSICAL EXAM:  Constitutional: NAD  HEENT: anicteric sclera, oropharynx clear, MMM  Neck: No JVD  Respiratory: dec bibasilar BS, no wheezes  Cardiovascular: S1, S2, RRR  Gastrointestinal: BS+, soft, NT/ND  Extremities: No cyanosis or clubbing. No peripheral edema  Neurological: A/O x 3, no focal deficits  Psychiatric: Normal mood, normal affect  : No CVA tenderness. No rosa.   Skin: No rashes  Vascular Access: rt IJ PC    LABS:  05-30    129<L>  |  91<L>  |  48<H>  ----------------------------<  111<H>  4.4   |  23  |  5.53<H>    Ca    8.7      30 May 2018 05:45  Phos  4.0     05-30  Mg     2.2     05-30      Creatinine Trend: 5.53 <--, 4.51 <--, 5.62 <--, 4.88 <--                        12.0   5.88  )-----------( 92       ( 30 May 2018 05:45 )             39.7     Urine Studies:        RADIOLOGY & ADDITIONAL STUDIES:

## 2018-05-30 NOTE — PROGRESS NOTE ADULT - PROBLEM SELECTOR PLAN 1
-Pt remains short of breath.  -Would continue w/ Dobutamine 7.5 mcg/kg/min. No BB.   -Continue with midodrine for SBP augmentation for HD. Unable to tolerate vasodilators.   -He has not been an advanced HF therapy (LVAD/transplant) candidate in past due to severe interstitial lung disease.   -Await RHC today. -Pt remains short of breath despite PUF yesterday. Await RHC w/ swan to be kept in neck today, pending results.  -Would continue w/ Dobutamine 7.5 mcg/kg/min. No BB.   -Continue with midodrine for SBP augmentation for HD. Unable to tolerate vasodilators.   -He has not been an advanced HF therapy (LVAD/transplant) candidate in past due to severe interstitial lung disease.   -Await RHC today. If RHC shows normal hemodynamics, pt should get steroids as planned by pulmonary.

## 2018-05-30 NOTE — PROGRESS NOTE ADULT - PROBLEM SELECTOR PLAN 9
bleed that prompted admission resolved w pressure.   - cont humidified O2  - start nasal spray during day to keep nares moist  - called home O2 supplier at [phone]; they said that they would provide pt/family with attachment(s) to permit humidification of home O2

## 2018-05-30 NOTE — PROGRESS NOTE ADULT - ASSESSMENT
63yM w severe HFrEF (on dobutamine gtt x3 yrs) w [employer] AICD, A-fib (on warfarin), ESRD on HD MWF (+Sat PRN), pulmonary fibrosis, on 5/27 p/w epistaxis x1 day, also w acute-on-chronic SOB, found to be volume-overloaded despite reported med compliance, no dietary changes, and having gotten extra session of HD the day prior to admission. SBP 80s-90s in UEs, so to be monitored while undergoing HD was transferred to CCU, where we are attempting to optimize his cardiopulmonary status.

## 2018-05-30 NOTE — PROGRESS NOTE ADULT - ATTENDING COMMENTS
No overnight events. Unable to float PAC from IJ due to Permacath and ICD hardware. Placed via RFV this morning in cath lab.  RA 14, PA 44/18/22, PCW 22, PA 58%, Ao 92%, CO/CI (F) 4.66/2.46,  dsc.  Scheduled for HD today.    He remains orthopneic and volume overloaded. Would benefit from volume removal to a target CVP 10.  OK to use IV pressor (eg, Levophed) if needed to maintain SBP > 90 during volume removal. Probably better than midodrine acutely.  If he cannot tolerate HD, would instead do CVVHD.  Please cut  to 5 mcg/kg/min and repeat PA sat, Cj CO/CI in 2 hours. If stable, ok to lower further to 2.5 mcg/kg/min.    If steroids are needed for his pulmonary disease, they should be dispensed per Pulm recommendations and we will manage fluid, as needed.  When able to lay flat for [facility], would interrogate his coronaries.

## 2018-05-30 NOTE — PROGRESS NOTE ADULT - SUBJECTIVE AND OBJECTIVE BOX
Note Incomplete  --- Pending Attending Rounds      Briefly:  Interval Events:   Subjective:     Physical:  ICU Vital Signs Last 24 Hrs  T(C): 36.1 (30 May 2018 12:00), Max: 36.8 (29 May 2018 16:00)  T(F): 96.9 (30 May 2018 12:00), Max: 98.2 (29 May 2018 16:00)  HR: 79 (30 May 2018 13:00) (60 - 82)  BP: 117/54 (30 May 2018 13:00) (82/60 - 117/78)  BP(mean): 66 (30 May 2018 13:00) (54 - 87)  ABP: --  ABP(mean): --  RR: 18 (30 May 2018 12:07) (0 - 28)  SpO2: 96% (30 May 2018 13:00) (91% - 99%)      Telemetry:   EKG:    Exam:      LABS:  CAPILLARY BLOOD GLUCOSE        I&O's Summary    29 May 2018 07:01  -  30 May 2018 07:00  --------------------------------------------------------  IN: 1364 mL / OUT: 3800 mL / NET: -2436 mL                              12.0   5.88  )-----------( 92       ( 30 May 2018 05:45 )             39.7     WBC Trend: 5.88<--, 6.79<--, 6.45<--  05-30    129<L>  |  91<L>  |  48<H>  ----------------------------<  111<H>  4.4   |  23  |  5.53<H>    Ca    8.7      30 May 2018 05:45  Phos  4.0     05-30  Mg     2.2     05-30      Creatinine Trend: 5.53<--, 4.51<--, 5.62<--, 4.88<--  PT/INR - ( 30 May 2018 05:45 )   PT: 15.4 SEC;   INR: 1.33                    Imaging:        MEDICATIONS  (STANDING):  amiodarone    Tablet 100 milliGRAM(s) Oral daily  atorvastatin 80 milliGRAM(s) Oral at bedtime  bisacodyl 5 milliGRAM(s) Oral at bedtime  chlorhexidine 4% Liquid 1 Application(s) Topical <User Schedule>  DOBUTamine Infusion 5 MICROgram(s)/kG/Min (11.25 mL/Hr) IV Continuous <Continuous>  finasteride 5 milliGRAM(s) Oral daily  levothyroxine 75 MICROGram(s) Oral daily  midodrine 10 milliGRAM(s) Oral once  senna 2 Tablet(s) Oral at bedtime  sevelamer hydrochloride 1600 milliGRAM(s) Oral <User Schedule>  sodium chloride 0.65% Nasal 1 Spray(s) Both Nostrils four times a day    MEDICATIONS  (PRN):  ALBUTerol/ipratropium for Nebulization 3 milliLiter(s) Nebulizer every 6 hours PRN Shortness of Breath and/or Wheezing  docusate sodium 100 milliGRAM(s) Oral two times a day PRN Constipation      Consult Recommendations: Note Incomplete  --- Pending Attending Rounds      Briefly:  Interval Events:   Subjective:     Physical:  ICU Vital Signs Last 24 Hrs  T(C): 36.1 (30 May 2018 12:00), Max: 36.8 (29 May 2018 16:00)  T(F): 96.9 (30 May 2018 12:00), Max: 98.2 (29 May 2018 16:00)  HR: 79 (30 May 2018 13:00) (60 - 82)  BP: 117/54 (30 May 2018 13:00) (82/60 - 117/78)  BP(mean): 66 (30 May 2018 13:00) (54 - 87)  ABP: --  ABP(mean): --  RR: 18 (30 May 2018 12:07) (0 - 28)  SpO2: 96% (30 May 2018 13:00) (91% - 99%)      Telemetry: A-fib, PVCs in runs as many as 4 consecutive beats    EKG: A-fib, extremity leads low voltage, no ST elevation or depression, grossly similar to prior    Exam:      LABS:  CAPILLARY BLOOD GLUCOSE        I&O's Summary    29 May 2018 07:01  -  30 May 2018 07:00  --------------------------------------------------------  IN: 1364 mL / OUT: 3800 mL / NET: -2436 mL                              12.0   5.88  )-----------( 92       ( 30 May 2018 05:45 )             39.7     WBC Trend: 5.88<--, 6.79<--, 6.45<--  05-30    129<L>  |  91<L>  |  48<H>  ----------------------------<  111<H>  4.4   |  23  |  5.53<H>    Ca    8.7      30 May 2018 05:45  Phos  4.0     05-30  Mg     2.2     05-30      Creatinine Trend: 5.53<--, 4.51<--, 5.62<--, 4.88<--  PT/INR - ( 30 May 2018 05:45 )   PT: 15.4 SEC;   INR: 1.33                    Imaging:        MEDICATIONS  (STANDING):  amiodarone    Tablet 100 milliGRAM(s) Oral daily  atorvastatin 80 milliGRAM(s) Oral at bedtime  bisacodyl 5 milliGRAM(s) Oral at bedtime  chlorhexidine 4% Liquid 1 Application(s) Topical <User Schedule>  DOBUTamine Infusion 5 MICROgram(s)/kG/Min (11.25 mL/Hr) IV Continuous <Continuous>  finasteride 5 milliGRAM(s) Oral daily  levothyroxine 75 MICROGram(s) Oral daily  midodrine 10 milliGRAM(s) Oral once  senna 2 Tablet(s) Oral at bedtime  sevelamer hydrochloride 1600 milliGRAM(s) Oral <User Schedule>  sodium chloride 0.65% Nasal 1 Spray(s) Both Nostrils four times a day    MEDICATIONS  (PRN):  ALBUTerol/ipratropium for Nebulization 3 milliLiter(s) Nebulizer every 6 hours PRN Shortness of Breath and/or Wheezing  docusate sodium 100 milliGRAM(s) Oral two times a day PRN Constipation      Consult Recommendations: Note Complete    Briefly: 63yM severe HFrEF (chronic  gtt), Pulm Fibrosis, A-fib, ESRD on HD, admitted for worsening dyspnea, found to be volume-overloaded despite HD    Interval Events:     Subjective:     Physical:  ICU Vital Signs Last 24 Hrs  T(C): 36.1 (30 May 2018 12:00), Max: 36.8 (29 May 2018 16:00)  T(F): 96.9 (30 May 2018 12:00), Max: 98.2 (29 May 2018 16:00)  HR: 79 (30 May 2018 13:00) (60 - 82)  BP: 117/54 (30 May 2018 13:00) (82/60 - 117/78)  BP(mean): 66 (30 May 2018 13:00) (54 - 87)  ABP: --  ABP(mean): --  RR: 18 (30 May 2018 12:07) (0 - 28)  SpO2: 96% (30 May 2018 13:00) (91% - 99%)      Telemetry: A-fib, PVCs in runs as many as 4 consecutive beats    EKG: A-fib, extremity leads low voltage, no ST elevation or depression, grossly similar to prior    Exam:      LABS:  CAPILLARY BLOOD GLUCOSE        I&O's Summary    29 May 2018 07:01  -  30 May 2018 07:00  --------------------------------------------------------  IN: 1364 mL / OUT: 3800 mL / NET: -2436 mL                              12.0   5.88  )-----------( 92       ( 30 May 2018 05:45 )             39.7     WBC Trend: 5.88<--, 6.79<--, 6.45<--  05-30    129<L>  |  91<L>  |  48<H>  ----------------------------<  111<H>  4.4   |  23  |  5.53<H>    Ca    8.7      30 May 2018 05:45  Phos  4.0     05-30  Mg     2.2     0530      Creatinine Trend: 5.53<--, 4.51<--, 5.62<--, 4.88<--  PT/INR - ( 30 May 2018 05:45 )   PT: 15.4 SEC;   INR: 1.33                    Imaging:        MEDICATIONS  (STANDING):  amiodarone    Tablet 100 milliGRAM(s) Oral daily  atorvastatin 80 milliGRAM(s) Oral at bedtime  bisacodyl 5 milliGRAM(s) Oral at bedtime  chlorhexidine 4% Liquid 1 Application(s) Topical <User Schedule>  DOBUTamine Infusion 5 MICROgram(s)/kG/Min (11.25 mL/Hr) IV Continuous <Continuous>  finasteride 5 milliGRAM(s) Oral daily  levothyroxine 75 MICROGram(s) Oral daily  midodrine 10 milliGRAM(s) Oral once  senna 2 Tablet(s) Oral at bedtime  sevelamer hydrochloride 1600 milliGRAM(s) Oral <User Schedule>  sodium chloride 0.65% Nasal 1 Spray(s) Both Nostrils four times a day    MEDICATIONS  (PRN):  ALBUTerol/ipratropium for Nebulization 3 milliLiter(s) Nebulizer every 6 hours PRN Shortness of Breath and/or Wheezing  docusate sodium 100 milliGRAM(s) Oral two times a day PRN Constipation      Consult Recommendations: Note Complete    Briefly: 63yM severe HFrEF (chronic  gtt), Pulm Fibrosis, A-fib, ESRD on HD, admitted for worsening dyspnea, found to be volume-overloaded despite HD    Interval Events: ultrafiltration was late in day, so no RHC yday    Subjective:   GEN no fevers, no chills  RESP ongoing dyspnea, non-productive cough. no wheeze  CV some PND. orthopnea (HOB 30 degrees). no CP, no palpitations  GI LBM y'day, stool hard    Physical:  ICU Vital Signs Last 24 Hrs  T(C): 36.1 (30 May 2018 12:00), Max: 36.8 (29 May 2018 16:00)  T(F): 96.9 (30 May 2018 12:00), Max: 98.2 (29 May 2018 16:00)  HR: 79 (30 May 2018 13:00) (60 - 82)  BP: 117/54 (30 May 2018 13:00) (82/60 - 117/78)  BP(mean): 66 (30 May 2018 13:00) (54 - 87)  ABP: --  ABP(mean): --  RR: 18 (30 May 2018 12:07) (0 - 28)  SpO2: 96% (30 May 2018 13:00) (91% - 99%)      Telemetry: A-fib, PVCs in runs as many as 4 consecutive beats    EKG: A-fib, extremity leads low voltage, no ST elevation or depression, grossly similar to prior    Exam:  GEN no acute distress, breathing mildly uncomfortably on 5 L NC  NECK jvd ~8cm above top of sternum  RESP crackles R posterior lung field, L anterior lung field, no wheeze  CV irreg irreg, s1 s2  EXT VIRGIL appears improved from y'day.   DERM  R chest-wall port non-tender, non-swollen, non-erythematous. LUE PICC/mid-line non-tender      LABS:  CAPILLARY BLOOD GLUCOSE        I&O's Summary    29 May 2018 07:01  -  30 May 2018 07:00  --------------------------------------------------------  IN: 1364 mL / OUT: 3800 mL / NET: -2436 mL                              12.0   5.88  )-----------( 92       ( 30 May 2018 05:45 )             39.7     WBC Trend: 5.88<--, 6.79<--, 6.45<--  05-30    129<L>  |  91<L>  |  48<H>  ----------------------------<  111<H>  4.4   |  23  |  5.53<H>    Ca    8.7      30 May 2018 05:45  Phos  4.0     05-30  Mg     2.2     0530      Creatinine Trend: 5.53<--, 4.51<--, 5.62<--, 4.88<--  PT/INR - ( 30 May 2018 05:45 )   PT: 15.4 SEC;   INR: 1.33                    Imaging:        MEDICATIONS  (STANDING):  amiodarone    Tablet 100 milliGRAM(s) Oral daily  atorvastatin 80 milliGRAM(s) Oral at bedtime  bisacodyl 5 milliGRAM(s) Oral at bedtime  chlorhexidine 4% Liquid 1 Application(s) Topical <User Schedule>  DOBUTamine Infusion 5 MICROgram(s)/kG/Min (11.25 mL/Hr) IV Continuous <Continuous>  finasteride 5 milliGRAM(s) Oral daily  levothyroxine 75 MICROGram(s) Oral daily  midodrine 10 milliGRAM(s) Oral once  senna 2 Tablet(s) Oral at bedtime  sevelamer hydrochloride 1600 milliGRAM(s) Oral <User Schedule>  sodium chloride 0.65% Nasal 1 Spray(s) Both Nostrils four times a day    MEDICATIONS  (PRN):  ALBUTerol/ipratropium for Nebulization 3 milliLiter(s) Nebulizer every 6 hours PRN Shortness of Breath and/or Wheezing  docusate sodium 100 milliGRAM(s) Oral two times a day PRN Constipation      Consult Recommendations:

## 2018-05-30 NOTE — PROGRESS NOTE ADULT - PROBLEM SELECTOR PLAN 1
had extra PUF yesterday, removed 3 kg, tolerated well  scheduled for HD today w/2k bath, uf 3-3.5kg as tolearted. bp better  low Na 2/2 hypervolemia. chronic non adherance w/fluid restriction   renal diet , fluid restriction 1L/day

## 2018-05-30 NOTE — PROGRESS NOTE ADULT - ASSESSMENT
·	End Stage Renal Disease on Dialysis + congestive heart failure with hypotension on dobutamine gtt with signs of fluid overload,  ·	anemia hb stable  ·	congestive heart failure on dobutamine drip, follow up with       labs reviewed

## 2018-05-30 NOTE — PROGRESS NOTE ADULT - SUBJECTIVE AND OBJECTIVE BOX
SUBJECTIVE:  Somnolent. Comfortable. Brother at bedside.     MEDICATIONS:  amiodarone    Tablet 100 milliGRAM(s) Oral daily  DOBUTamine Infusion 7.5 MICROgram(s)/kG/Min IV Continuous <Continuous>  midodrine 10 milliGRAM(s) Oral once      ALBUTerol/ipratropium for Nebulization 3 milliLiter(s) Nebulizer every 6 hours PRN      bisacodyl 5 milliGRAM(s) Oral at bedtime  docusate sodium 100 milliGRAM(s) Oral two times a day PRN  senna 2 Tablet(s) Oral at bedtime    atorvastatin 80 milliGRAM(s) Oral at bedtime  finasteride 5 milliGRAM(s) Oral daily  levothyroxine 75 MICROGram(s) Oral daily    chlorhexidine 4% Liquid 1 Application(s) Topical <User Schedule>  sodium chloride 0.65% Nasal 1 Spray(s) Both Nostrils four times a day      REVIEW OF SYSTEMS:    CONSTITUTIONAL: No fever, weight loss, or fatigue  EYES: No eye pain, visual disturbances, or discharge  NECK: No pain or stiffness  RESPIRATORY: No cough, wheezing, chills or hemoptysis; No Shortness of Breath  CARDIOVASCULAR: No chest pain, palpitations, dizziness, or leg swelling  GASTROINTESTINAL: No abdominal or epigastric pain. No nausea, vomiting, or hematemesis; No diarrhea or constipation. No melena or hematochezia.  GENITOURINARY: No dysuria, frequency, hematuria, or incontinence  NEUROLOGICAL: No headaches, memory loss, loss of strength, numbness, or tremors  SKIN: No itching, burning, rashes, or lesions   LYMPH Nodes: No enlarged glands  MUSCULOSKELETAL: No joint pain or swelling; No muscle, back, or extremity pain  All other review of systems are negative.  	  [ ] Unable to obtain    PHYSICAL EXAM:  T(C): 36.1 (05-30-18 @ 12:00), Max: 36.8 (05-29-18 @ 16:00)  HR: 79 (05-30-18 @ 13:00) (60 - 82)  BP: 109/59 (05-30-18 @ 12:00) (82/60 - 117/78)  RR: 18 (05-30-18 @ 12:07) (0 - 28)  SpO2: 96% (05-30-18 @ 13:00) (91% - 99%)  Wt(kg): --  I&O's Summary    29 May 2018 07:01  -  30 May 2018 07:00  --------------------------------------------------------  IN: 1364 mL / OUT: 3800 mL / NET: -2436 mL          PHYSICAL EXAM    Appearance: Normal	  HEENT:   Normal oral mucosa, PERRL, EOMI	  NECK: Soft and supple, No LAD, No JVD  Cardiovascular: Regular Rate and Rhythm, Normal S1 S2, No murmurs, No clicks, gallops or rubs  Respiratory: Lungs clear to auscultation	  Gastrointestinal:  Soft, Non-tender, + BS	  Skin: No rashes, No ecchymoses, No cyanosis  Neurologic: Non-focal  Extremities: No clubbing, cyanosis or edema  Vascular: Peripheral pulses palpable 2+ bilaterally    LABS:	 	                          12.0   5.88  )-----------( 92       ( 30 May 2018 05:45 )             39.7     05-30    129<L>  |  91<L>  |  48<H>  ----------------------------<  111<H>  4.4   |  23  |  5.53<H>    Ca    8.7      30 May 2018 05:45  Phos  4.0     05-30  Mg     2.2     05-30      proBNP:   Lipid Profile:   HgA1c:   TSH:

## 2018-05-30 NOTE — PROGRESS NOTE ADULT - PROBLEM SELECTOR PLAN 1
secondary to a combination of ILD as well as chf: ? Pt is using cpap/ bipap at home: The compliance report is given to the resident on floor today: Pt has not been complaint with machine at home: He used it only for 6 days and that too, for less then 2 hours. Called his wife to get more information, no answer: Left message to call my office. Pt was discharged on bipap last time : Can restart bipap at 12/5 with 40% fio2 in the night while sleeping and prn in the daytime:

## 2018-05-30 NOTE — PROGRESS NOTE ADULT - ASSESSMENT
63 year old man with ESRD (HD MWF), NICM (EF 21%) s/p ICD, PICC line in place with home dobutamine drip, atrial fibrillation on coumadin, COPD on home O2 (4-5L), pulmonary fibrosis with increasing shortness of breath and severe epistaxis.  No more epistaxis.  Continue dobutamine.  Echo results unchanged from previous study.  EF is 15%.  On Midodrine  Severe ILD precludes ability to implant LVAD  BIPAP per Pulmonary  Continue present care

## 2018-05-30 NOTE — PROGRESS NOTE ADULT - SUBJECTIVE AND OBJECTIVE BOX
Patient seen and examined.    Medications:  ALBUTerol/ipratropium for Nebulization 3 milliLiter(s) Nebulizer every 6 hours PRN  amiodarone    Tablet 100 milliGRAM(s) Oral daily  atorvastatin 80 milliGRAM(s) Oral at bedtime  bisacodyl 5 milliGRAM(s) Oral at bedtime  chlorhexidine 4% Liquid 1 Application(s) Topical <User Schedule>  DOBUTamine Infusion 7.5 MICROgram(s)/kG/Min IV Continuous <Continuous>  docusate sodium 100 milliGRAM(s) Oral two times a day PRN  finasteride 5 milliGRAM(s) Oral daily  levothyroxine 75 MICROGram(s) Oral daily  senna 2 Tablet(s) Oral at bedtime  sevelamer hydrochloride 1600 milliGRAM(s) Oral <User Schedule>  sodium chloride 0.65% Nasal 1 Spray(s) Both Nostrils four times a day      Vitals:  Vital Signs Last 24 Hrs  T(C): 36.1 (30 May 2018 00:00), Max: 36.8 (29 May 2018 16:00)  T(F): 97 (30 May 2018 00:00), Max: 98.2 (29 May 2018 16:00)  HR: 80 (30 May 2018 06:00) (60 - 84)  BP: 103/76 (30 May 2018 06:00) (82/60 - 117/78)  BP(mean): 83 (30 May 2018 06:00) (57 - 87)  RR: 17 (30 May 2018 06:00) (14 - 28)  SpO2: 92% (30 May 2018 06:00) (91% - 98%)    Daily     Daily Weight in k.2 (29 May 2018 20:25)    I&O's Detail    29 May 2018 07:01  -  30 May 2018 07:00  --------------------------------------------------------  IN:    DOBUTamine Infusion: 204 mL    Oral Fluid: 360 mL    Other: 800 mL  Total IN: 1364 mL    OUT:    Other: 3800 mL  Total OUT: 3800 mL    Total NET: -2436 mL      Physical Exam:     General: No distress. Comfortable.  HEENT: EOM intact.  Neck: Neck supple. JVP moderately elevated. No masses  Chest: Clear to auscultation bilaterally  CV: Normal S1 and S2. No murmurs, rub, or gallops. Radial pulses normal.  Abdomen: Soft, non-distended, non-tender  Skin: No rashes or skin breakdown  Neurology: Alert and oriented times three. Sensation intact  Psych: Affect normal    Labs:                        12.0   5.88  )-----------( 92       ( 30 May 2018 05:45 )             39.7     05-30    129<L>  |  91<L>  |  48<H>  ----------------------------<  111<H>  4.4   |  23  |  5.53<H>    Ca    8.7      30 May 2018 05:45  Phos  4.0     05-30  Mg     2.2     05-30      PT/INR - ( 30 May 2018 05:45 )   PT: 15.4 SEC;   INR: 1.33 Patient seen and examined. He feels the same, still feels SOB at rest. No CP, palpitations.     Medications:  ALBUTerol/ipratropium for Nebulization 3 milliLiter(s) Nebulizer every 6 hours PRN  amiodarone    Tablet 100 milliGRAM(s) Oral daily  atorvastatin 80 milliGRAM(s) Oral at bedtime  bisacodyl 5 milliGRAM(s) Oral at bedtime  chlorhexidine 4% Liquid 1 Application(s) Topical <User Schedule>  DOBUTamine Infusion 7.5 MICROgram(s)/kG/Min IV Continuous <Continuous>  docusate sodium 100 milliGRAM(s) Oral two times a day PRN  finasteride 5 milliGRAM(s) Oral daily  levothyroxine 75 MICROGram(s) Oral daily  senna 2 Tablet(s) Oral at bedtime  sevelamer hydrochloride 1600 milliGRAM(s) Oral <User Schedule>  sodium chloride 0.65% Nasal 1 Spray(s) Both Nostrils four times a day      Vitals:  Vital Signs Last 24 Hrs  T(C): 36.1 (30 May 2018 00:00), Max: 36.8 (29 May 2018 16:00)  T(F): 97 (30 May 2018 00:00), Max: 98.2 (29 May 2018 16:00)  HR: 80 (30 May 2018 06:00) (60 - 84)  BP: 103/76 (30 May 2018 06:00) (82/60 - 117/78)  BP(mean): 83 (30 May 2018 06:00) (57 - 87)  RR: 17 (30 May 2018 06:00) (14 - 28)  SpO2: 92% (30 May 2018 06:00) (91% - 98%)    Daily     Daily Weight in k.2 (29 May 2018 20:25)    I&O's Detail    29 May 2018 07:01  -  30 May 2018 07:00  --------------------------------------------------------  IN:    DOBUTamine Infusion: 204 mL    Oral Fluid: 360 mL    Other: 800 mL  Total IN: 1364 mL    OUT:    Other: 3800 mL  Total OUT: 3800 mL    Total NET: -2436 mL      Physical Exam:     General: No distress. Comfortable.  HEENT: EOM intact.  Neck: Neck supple. JVP moderately elevated. No masses  Chest: Clear to auscultation bilaterally  CV: Normal S1 and S2. +KEVIN at right sternal border,  no rub, or gallops. Radial pulses normal. No LE edema b/l.   Abdomen: Soft, non-distended, non-tender  Skin: No rashes or skin breakdown  Neurology: Alert and oriented times three. Sensation intact  Psych: Affect normal    Labs:                        12.0   5.88  )-----------( 92       ( 30 May 2018 05:45 )             39.7     05-30    129<L>  |  91<L>  |  48<H>  ----------------------------<  111<H>  4.4   |  23  |  5.53<H>    Ca    8.7      30 May 2018 05:45  Phos  4.0     05-30  Mg     2.2     05-30      PT/INR - ( 30 May 2018 05:45 )   PT: 15.4 SEC;   INR: 1.33       < from: TTE with Doppler (w/Cont) (18 @ 08:43) >  DIMENSIONS:  Dimensions:     Normal Values:  LA:     5.2 cm    2.0 - 4.0 cm  Ao:     3.2 cm    2.0 - 3.8 cm  SEPTUM: 1.0 cm    0.6 - 1.2 cm  PWT:    0.9 cm    0.6 - 1.1 cm  LVIDd:  6.0 cm    3.0 - 5.6 cm  LVIDs:  5.6 cm    1.8 - 4.0 cm  Derived Variables:  LVMI: 122 g/m2  RWT: 0.30  Fractional short: 7 %  Ejection Fraction (Teicholtz): 15 %  ------------------------------------------------------------------------  OBSERVATIONS:  Mitral Valve: Normal mitral valve. Mild mitral  regurgitation.  Aortic Root: Normal aortic root.  Aortic Valve: Normal trileaflet aortic valve. Peak left  ventricular outflow tract gradient equals 3.2 mm Hg, mean  gradient is equal to 1 mm Hg, LVOT velocity time integral  equals 16 cm.  Left Atrium: Mildly dilated left atrium.  LA volume index =  40 cc/m2.  Left Ventricle: Severe global left ventricular systolic  dysfunction.   Endocardial visualization enhanced with  intravenous injection of echo contrast (Definity). No left  ventricular thrombus.   Septal motion consistent with right  ventricular overload. Eccentric left ventricular  hypertrophy (dilated left ventricle with normal relative  wall thickness). (DT:449 ms).  Right Heart: Severe right atrial enlargement.   A device  wire is noted in the right heart. Right ventricular  enlargement with decreased right ventricular systolic  function. Normal tricuspid valve.  Severe tricuspid  regurgitation. Normal pulmonic valve. Mild pulmonic  regurgitation.  Pericardium/PleuraSmall pericardial effusion.  Hemodynamic: Estimated right ventricular systolic pressure  equals 56 mm Hg, assuming right atrial pressure equals 10  mm Hg, consistent with moderate pulmonary hypertension.    < end of copied text >

## 2018-05-30 NOTE — PROGRESS NOTE ADULT - ASSESSMENT
62 y/o male w/ PMHX of MI in past (Per patient, [facility]) no stents, HFrEF (LVEF 15%, LVIDd 6.0 cm, w/ AICD) severe TR, on chronic home dobutamine 7.5 mcg/kg/min,  ESRD on HD (3-4 x a week), HLD, DM II, a.fib on coumadin, interstitial pulmonary lung disease on chronic home O2, hypotension on midodrine comes in with epistaxis. Epistaxis is now resolved. He was found to be hypervolemic, and was given UF the same night, and HD the next day. He will have HD today as well. He has been admitted 6 times this year for various reasons, but mostly for SOB.  RHC 17 on 2.5 mcg/kg/min of dobutamine 2.5 mcg/kg/min showed RA 25, PCWP 25, PA 61/29/39, PA sat 42.8%, CI 1.65, CO 3.60, PVR 3.84. Currently He is on  7.5 mcg/kg/min. He admits to SOB at rest sometimes, but mostly with minimal exertion (typically walking 10 steps), orthopnea. No CP, palpitations, dizziness or syncope. ICD does not reveal any events. 62 y/o male w/ PMHX of MI in past (Per patient, [facility]) no stents, HFrEF (LVEF 15%, LVIDd 6.0 cm, w/ AICD) severe TR, on chronic home dobutamine 7.5 mcg/kg/min,  ESRD on HD (3-4 x a week), HLD, DM II, a.fib on coumadin, interstitial pulmonary lung disease on chronic home O2, hypotension on midodrine comes in with epistaxis. Epistaxis is now resolved. He was found to be hypervolemic, and was given UF the same night, and HD the next day. He will have HD today as well. He has been admitted 6 times this year for various reasons, but mostly for SOB.  RHC 17 on 2.5 mcg/kg/min of dobutamine 2.5 mcg/kg/min showed RA 25, PCWP 25, PA 61/29/39, PA sat 42.8%, CI 1.65, CO 3.60, PVR 3.84. Currently He is on  7.5 mcg/kg/min. He admits to SOB at rest sometimes, but mostly with minimal exertion (typically walking 10 steps), orthopnea. No CP, palpitations, dizziness or syncope. ICD does not reveal any events. Await RHC today.

## 2018-05-30 NOTE — PROGRESS NOTE ADULT - ASSESSMENT
62 y/o M PMHx significant for severe CHF, on chronic dobutamine gtt at home x3 years (follows up with Dr. Davis), AFib on coumadin, AICD, ESRD on HD MWF (plus add'l session on Saturday prn), pulmonary fibrosis, presents to the ED with chief complaint of epistaxis that started yesterday. More significantly, patient with chronic SOB that is worsened from baseline, likely multifactorial d/t volume overload likely 2/2 ESRD and CHF. Patient has been on dobutamine gtt, but may need titratable dosing. Will consult CCU.

## 2018-05-30 NOTE — PROGRESS NOTE ADULT - SUBJECTIVE AND OBJECTIVE BOX
Patient is a 63y old  Male who presents with a chief complaint of SOB (29 May 2018 15:55)      Any change in ROS: Still SOB     MEDICATIONS  (STANDING):  amiodarone    Tablet 100 milliGRAM(s) Oral daily  atorvastatin 80 milliGRAM(s) Oral at bedtime  bisacodyl 5 milliGRAM(s) Oral at bedtime  chlorhexidine 4% Liquid 1 Application(s) Topical <User Schedule>  DOBUTamine Infusion 7.5 MICROgram(s)/kG/Min (16.875 mL/Hr) IV Continuous <Continuous>  finasteride 5 milliGRAM(s) Oral daily  levothyroxine 75 MICROGram(s) Oral daily  midodrine 10 milliGRAM(s) Oral once  senna 2 Tablet(s) Oral at bedtime  sevelamer hydrochloride 1600 milliGRAM(s) Oral <User Schedule>  sodium chloride 0.65% Nasal 1 Spray(s) Both Nostrils four times a day    MEDICATIONS  (PRN):  ALBUTerol/ipratropium for Nebulization 3 milliLiter(s) Nebulizer every 6 hours PRN Shortness of Breath and/or Wheezing  docusate sodium 100 milliGRAM(s) Oral two times a day PRN Constipation    Vital Signs Last 24 Hrs  T(C): 36.2 (30 May 2018 08:00), Max: 36.8 (29 May 2018 16:00)  T(F): 97.2 (30 May 2018 08:00), Max: 98.2 (29 May 2018 16:00)  HR: 75 (30 May 2018 12:07) (60 - 82)  BP: 108/59 (30 May 2018 11:00) (82/60 - 117/78)  BP(mean): 69 (30 May 2018 11:00) (54 - 87)  RR: 18 (30 May 2018 12:07) (0 - 28)  SpO2: 96% (30 May 2018 12:07) (91% - 99%)    I&O's Summary    29 May 2018 07:01  -  30 May 2018 07:00  --------------------------------------------------------  IN: 1364 mL / OUT: 3800 mL / NET: -2436 mL          Physical Exam:   GENERAL: NAD, well-groomed, well-developed  HEENT: BELL/   Atraumatic, Normocephalic  ENMT: No tonsillar erythema, exudates, or enlargement; Moist mucous membranes, Good dentition, No lesions  NECK: Supple, No JVD, Normal thyroid  CHEST/LUNG: [place] bilaterally+  CVS: Regular rate and rhythm; No murmurs, rubs, or gallops  GI: : Soft, Nontender, Nondistended; Bowel sounds present  NERVOUS SYSTEM:  Alert & Oriented X3  EXTREMITIES:+ edema  LYMPH: No lymphadenopathy noted  SKIN: No rashes or lesions  ENDOCRINOLOGY: No Thyromegaly  PSYCH: Appropriate    Labs:  24                            12.0   5.88  )-----------( 92       ( 30 May 2018 05:45 )             39.7                         11.3   6.79  )-----------( 97       ( 29 May 2018 04:00 )             37.5                         11.6   6.45  )-----------( 101      ( 28 May 2018 02:57 )             38.0                         11.6   5.87  )-----------( 103      ( 27 May 2018 11:48 )             38.4     05-30    129<L>  |  91<L>  |  48<H>  ----------------------------<  111<H>  4.4   |  23  |  5.53<H>  05-29    133<L>  |  93<L>  |  34<H>  ----------------------------<  130<H>  4.3   |  27  |  4.51<H>  05-28    131<L>  |  91<L>  |  50<H>  ----------------------------<  133<H>  4.4   |  24  |  5.62<H>  05-27    133<L>  |  94<L>  |  43<H>  ----------------------------<  238<H>  4.1   |  26  |  4.88<H>    Ca    8.7      30 May 2018 05:45  Ca    8.7      29 May 2018 04:00  Phos  4.0     05-30  Phos  3.1     05-29  Mg     2.2     05-30  Mg     2.1     05-29    TPro  8.2  /  Alb  3.3  /  TBili  0.5  /  DBili  x   /  AST  29  /  ALT  28  /  AlkPhos  211<H>  05-28  TPro  8.1  /  Alb  3.3  /  TBili  0.4  /  DBili  x   /  AST  30  /  ALT  29  /  AlkPhos  215<H>  05-27    CAPILLARY BLOOD GLUCOSE            PT/INR - ( 30 May 2018 05:45 )   PT: 15.4 SEC;   INR: 1.33                    RECENT CULTURES:        RESPIRATORY CULTURES:    < from: Xray Chest 1 View- PORTABLE-Urgent (05.30.18 @ 11:23) >  PROCEDURE DATE:  May 30 2018         INTERPRETATION:    Portable chest xray: Linwood placement    Comparison: Most recent prior     FINDINGS:    Lines/Tubes: A new Linwood-Chris catheter seen with its tip in the proximal   right pulmonary artery    Heart and mediastinum:  Unremarkable.    Lungs, pleura, and airways: Pulmonary edema and right effusion are   unchanged. No pneumothorax    Bones and soft tissues: The bones and soft tissues are unchanged.    Impression:    A new Linwood-Chris catheter seen with its tip in the proximal right   pulmonary artery    Pulmonary edema and right effusion are unchanged. No pneumothorax          < from: Xray Chest 1 View- PORTABLE-Urgent (05.27.18 @ 12:13) >    EXAM:  XR CHEST PORTABLE URGENT 1V        PROCEDURE DATE:  May 27 2018         INTERPRETATION:  CLINICAL INFORMATION: History of CHF, shortness of   breath.    TECHNIQUE: AP view of the chest 5/27/2018.     COMPARISON: Chest radiograph 2/26/2018.    FINDINGS:     AICD overlies the left chest. A right-sided hemodialysis catheter with   the distal tip in the right atrium. There are diffuse bilateral reticular   opacities. There is a right pleural effusion. There is no pneumothorax.   Cardiac sizeis not accurately evaluated in this projection.  The visualized osseous structures demonstrate no acute abnormality.    IMPRESSION:   Pulmonary edema. Right pleural effusion.              LIU MUSE M.D., RADIOLOGY RESIDENT  This document has been electronically signed.  MARINE HERNÁNDEZ M.D.; ATTENDING RADIOLOGIST  This document has been electronically signed. May 28 2018  8:34AM    < end of copied text >          RADHA ROBINS M.D., ATTENDING RADIOLOGIST  This document has been electronically signed. May 30 2018 11:28AM    < end of copied text >        Studies  Chest X-RAY  CT SCAN Chest   Venous Dopplers: LE:   CT Abdomen  Others        < from: CT Chest No Cont (03.22.18 @ 16:15) >    COMPARISON: June 5, 2017.    PROCEDURE:   CT of the Chest was performed without intravenous contrast.  Sagittal and coronal reformats were performed.    FINDINGS:    LUNGS AND LARGE AIRWAYS: Unchanged traction bronchiectasis and bilateral   groundglass opacities.  PLEURA: Small right pleural effusion.  VESSELS: Right-sided central venous catheter with tip terminating in the   right atrium.   HEART: Cardiomegaly. Left chest wall ICD. No pericardial effusion.  MEDIASTINUM AND ASH: No lymphadenopathy.  CHEST WALL AND LOWER NECK: Within normal limits.  VISUALIZED UPPER ABDOMEN: Partially imaged dense material inside the   gallbladder, possibly gallstones.  BONES: Degenerative changes of the spine.    IMPRESSION:   Unchanged interstitial lung disease. No new consolidation.                  KELVIN CARMONA M.D., ATTENDING RADIOLOGIST  This document has been electronically signed. Mar 22 2018  4:42PM        < end of copied text >

## 2018-05-31 LAB
ALBUMIN SERPL ELPH-MCNC: 3.2 G/DL — LOW (ref 3.3–5)
ALP SERPL-CCNC: 206 U/L — HIGH (ref 40–120)
ALT FLD-CCNC: 24 U/L — SIGNIFICANT CHANGE UP (ref 4–41)
APTT BLD: 47.1 SEC — HIGH (ref 27.5–37.4)
AST SERPL-CCNC: 28 U/L — SIGNIFICANT CHANGE UP (ref 4–40)
BASE EXCESS BLDA CALC-SCNC: -0.5 MMOL/L — SIGNIFICANT CHANGE UP
BASE EXCESS BLDA CALC-SCNC: -0.7 MMOL/L — SIGNIFICANT CHANGE UP
BASE EXCESS BLDA CALC-SCNC: 0.1 MMOL/L — SIGNIFICANT CHANGE UP
BASE EXCESS BLDA CALC-SCNC: 1.1 MMOL/L — SIGNIFICANT CHANGE UP
BASE EXCESS BLDA CALC-SCNC: 1.5 MMOL/L — SIGNIFICANT CHANGE UP
BASE EXCESS BLDV CALC-SCNC: -0.3 MMOL/L — SIGNIFICANT CHANGE UP
BASE EXCESS BLDV CALC-SCNC: 0.9 MMOL/L — SIGNIFICANT CHANGE UP
BASE EXCESS BLDV CALC-SCNC: 0.9 MMOL/L — SIGNIFICANT CHANGE UP
BASE EXCESS BLDV CALC-SCNC: 1.4 MMOL/L — SIGNIFICANT CHANGE UP
BILIRUB DIRECT SERPL-MCNC: 0.2 MG/DL — SIGNIFICANT CHANGE UP (ref 0.1–0.2)
BILIRUB SERPL-MCNC: 0.4 MG/DL — SIGNIFICANT CHANGE UP (ref 0.2–1.2)
BLOOD GAS VENOUS - CREATININE: 3.96 MG/DL — HIGH (ref 0.5–1.3)
BLOOD GAS VENOUS - CREATININE: 4.7 MG/DL — HIGH (ref 0.5–1.3)
BUN SERPL-MCNC: 26 MG/DL — HIGH (ref 7–23)
CALCIUM SERPL-MCNC: 8.5 MG/DL — SIGNIFICANT CHANGE UP (ref 8.4–10.5)
CHLORIDE BLDA-SCNC: 97 MMOL/L — SIGNIFICANT CHANGE UP (ref 96–108)
CHLORIDE BLDA-SCNC: 97 MMOL/L — SIGNIFICANT CHANGE UP (ref 96–108)
CHLORIDE BLDA-SCNC: 98 MMOL/L — SIGNIFICANT CHANGE UP (ref 96–108)
CHLORIDE BLDA-SCNC: 98 MMOL/L — SIGNIFICANT CHANGE UP (ref 96–108)
CHLORIDE BLDV-SCNC: 97 MMOL/L — SIGNIFICANT CHANGE UP (ref 96–108)
CHLORIDE BLDV-SCNC: 97 MMOL/L — SIGNIFICANT CHANGE UP (ref 96–108)
CHLORIDE SERPL-SCNC: 88 MMOL/L — LOW (ref 98–107)
CO2 SERPL-SCNC: 26 MMOL/L — SIGNIFICANT CHANGE UP (ref 22–31)
CREAT SERPL-MCNC: 3.73 MG/DL — HIGH (ref 0.5–1.3)
GAS PNL BLDV: 125 MMOL/L — LOW (ref 136–146)
GAS PNL BLDV: 127 MMOL/L — LOW (ref 136–146)
GAS PNL BLDV: 128 MMOL/L — LOW (ref 136–146)
GLUCOSE BLDA-MCNC: 140 MG/DL — HIGH (ref 70–99)
GLUCOSE BLDA-MCNC: 164 MG/DL — HIGH (ref 70–99)
GLUCOSE BLDA-MCNC: 190 MG/DL — HIGH (ref 70–99)
GLUCOSE BLDA-MCNC: 244 MG/DL — HIGH (ref 70–99)
GLUCOSE BLDV-MCNC: 163 — HIGH (ref 70–99)
GLUCOSE BLDV-MCNC: 234 — HIGH (ref 70–99)
GLUCOSE BLDV-MCNC: 252 — HIGH (ref 70–99)
GLUCOSE SERPL-MCNC: 188 MG/DL — HIGH (ref 70–99)
HCO3 BLDA-SCNC: 23 MMOL/L — SIGNIFICANT CHANGE UP (ref 22–26)
HCO3 BLDA-SCNC: 23 MMOL/L — SIGNIFICANT CHANGE UP (ref 22–26)
HCO3 BLDA-SCNC: 24 MMOL/L — SIGNIFICANT CHANGE UP (ref 22–26)
HCO3 BLDA-SCNC: 24 MMOL/L — SIGNIFICANT CHANGE UP (ref 22–26)
HCO3 BLDA-SCNC: 25 MMOL/L — SIGNIFICANT CHANGE UP (ref 22–26)
HCO3 BLDV-SCNC: 23 MMOL/L — SIGNIFICANT CHANGE UP (ref 20–27)
HCO3 BLDV-SCNC: 24 MMOL/L — SIGNIFICANT CHANGE UP (ref 20–27)
HCT VFR BLD CALC: 38.2 % — LOW (ref 39–50)
HCT VFR BLDA CALC: 38.4 % — LOW (ref 39–51)
HCT VFR BLDA CALC: 39.3 % — SIGNIFICANT CHANGE UP (ref 39–51)
HCT VFR BLDA CALC: 42.5 % — SIGNIFICANT CHANGE UP (ref 39–51)
HCT VFR BLDA CALC: 44.2 % — SIGNIFICANT CHANGE UP (ref 39–51)
HCT VFR BLDV CALC: 36.7 % — LOW (ref 39–51)
HCT VFR BLDV CALC: 38.1 % — LOW (ref 39–51)
HCT VFR BLDV CALC: 42.5 % — SIGNIFICANT CHANGE UP (ref 39–51)
HGB BLD-MCNC: 11.8 G/DL — LOW (ref 13–17)
HGB BLDA-MCNC: 12.5 G/DL — LOW (ref 13–17)
HGB BLDA-MCNC: 12.8 G/DL — LOW (ref 13–17)
HGB BLDA-MCNC: 13.8 G/DL — SIGNIFICANT CHANGE UP (ref 13–17)
HGB BLDA-MCNC: 14.4 G/DL — SIGNIFICANT CHANGE UP (ref 13–17)
HGB BLDV-MCNC: 11.9 G/DL — LOW (ref 13–17)
HGB BLDV-MCNC: 12.4 G/DL — LOW (ref 13–17)
HGB BLDV-MCNC: 13.9 G/DL — SIGNIFICANT CHANGE UP (ref 13–17)
INR BLD: 1.75 — HIGH (ref 0.88–1.17)
LACTATE BLDA-SCNC: 0.8 MMOL/L — SIGNIFICANT CHANGE UP (ref 0.5–2)
LACTATE BLDA-SCNC: 0.8 MMOL/L — SIGNIFICANT CHANGE UP (ref 0.5–2)
LACTATE BLDA-SCNC: 1 MMOL/L — SIGNIFICANT CHANGE UP (ref 0.5–2)
LACTATE BLDA-SCNC: 1.9 MMOL/L — SIGNIFICANT CHANGE UP (ref 0.5–2)
LACTATE BLDV-MCNC: 0.7 MMOL/L — SIGNIFICANT CHANGE UP (ref 0.5–2)
LACTATE BLDV-MCNC: 0.9 MMOL/L — SIGNIFICANT CHANGE UP (ref 0.5–2)
MAGNESIUM SERPL-MCNC: 1.9 MG/DL — SIGNIFICANT CHANGE UP (ref 1.6–2.6)
MCHC RBC-ENTMCNC: 30.9 % — LOW (ref 32–36)
MCHC RBC-ENTMCNC: 31.3 PG — SIGNIFICANT CHANGE UP (ref 27–34)
MCV RBC AUTO: 101.3 FL — HIGH (ref 80–100)
NRBC # FLD: 0 — SIGNIFICANT CHANGE UP
PCO2 BLDA: 46 MMHG — SIGNIFICANT CHANGE UP (ref 35–48)
PCO2 BLDA: 46 MMHG — SIGNIFICANT CHANGE UP (ref 35–48)
PCO2 BLDA: 50 MMHG — HIGH (ref 35–48)
PCO2 BLDA: 52 MMHG — HIGH (ref 35–48)
PCO2 BLDA: 57 MMHG — HIGH (ref 35–48)
PCO2 BLDV: 55 MMHG — HIGH (ref 41–51)
PCO2 BLDV: 55 MMHG — HIGH (ref 41–51)
PCO2 BLDV: 57 MMHG — HIGH (ref 41–51)
PCO2 BLDV: 58 MMHG — HIGH (ref 41–51)
PH BLDA: 7.3 PH — LOW (ref 7.35–7.45)
PH BLDA: 7.32 PH — LOW (ref 7.35–7.45)
PH BLDA: 7.34 PH — LOW (ref 7.35–7.45)
PH BLDA: 7.35 PH — SIGNIFICANT CHANGE UP (ref 7.35–7.45)
PH BLDA: 7.35 PH — SIGNIFICANT CHANGE UP (ref 7.35–7.45)
PH BLDV: 7.29 PH — LOW (ref 7.32–7.43)
PH BLDV: 7.3 PH — LOW (ref 7.32–7.43)
PH BLDV: 7.3 PH — LOW (ref 7.32–7.43)
PH BLDV: 7.31 PH — LOW (ref 7.32–7.43)
PHOSPHATE SERPL-MCNC: 3.1 MG/DL — SIGNIFICANT CHANGE UP (ref 2.5–4.5)
PLATELET # BLD AUTO: 96 K/UL — LOW (ref 150–400)
PMV BLD: 12.9 FL — SIGNIFICANT CHANGE UP (ref 7–13)
PO2 BLDA: 39 MMHG — CRITICAL LOW (ref 83–108)
PO2 BLDA: 55 MMHG — LOW (ref 83–108)
PO2 BLDA: 82 MMHG — LOW (ref 83–108)
PO2 BLDA: 84 MMHG — SIGNIFICANT CHANGE UP (ref 83–108)
PO2 BLDA: 85 MMHG — SIGNIFICANT CHANGE UP (ref 83–108)
PO2 BLDV: 34 MMHG — LOW (ref 35–40)
PO2 BLDV: 34 MMHG — LOW (ref 35–40)
PO2 BLDV: 38 MMHG — SIGNIFICANT CHANGE UP (ref 35–40)
PO2 BLDV: 38 MMHG — SIGNIFICANT CHANGE UP (ref 35–40)
POTASSIUM BLDA-SCNC: 3.8 MMOL/L — SIGNIFICANT CHANGE UP (ref 3.4–4.5)
POTASSIUM BLDA-SCNC: 3.8 MMOL/L — SIGNIFICANT CHANGE UP (ref 3.4–4.5)
POTASSIUM BLDA-SCNC: 4.6 MMOL/L — HIGH (ref 3.4–4.5)
POTASSIUM BLDA-SCNC: 4.6 MMOL/L — HIGH (ref 3.4–4.5)
POTASSIUM BLDV-SCNC: 3.7 MMOL/L — SIGNIFICANT CHANGE UP (ref 3.4–4.5)
POTASSIUM BLDV-SCNC: 4 MMOL/L — SIGNIFICANT CHANGE UP (ref 3.4–4.5)
POTASSIUM BLDV-SCNC: 4.5 MMOL/L — SIGNIFICANT CHANGE UP (ref 3.4–4.5)
POTASSIUM SERPL-MCNC: 3.6 MMOL/L — SIGNIFICANT CHANGE UP (ref 3.5–5.3)
POTASSIUM SERPL-SCNC: 3.6 MMOL/L — SIGNIFICANT CHANGE UP (ref 3.5–5.3)
PROT SERPL-MCNC: 7.9 G/DL — SIGNIFICANT CHANGE UP (ref 6–8.3)
PROTHROM AB SERPL-ACNC: 19.6 SEC — HIGH (ref 9.8–13.1)
RBC # BLD: 3.77 M/UL — LOW (ref 4.2–5.8)
RBC # FLD: 15.4 % — HIGH (ref 10.3–14.5)
SAO2 % BLDA: 66.1 % — LOW (ref 95–99)
SAO2 % BLDA: 84.5 % — LOW (ref 95–99)
SAO2 % BLDA: 94.7 % — LOW (ref 95–99)
SAO2 % BLDA: 95.4 % — SIGNIFICANT CHANGE UP (ref 95–99)
SAO2 % BLDA: 95.4 % — SIGNIFICANT CHANGE UP (ref 95–99)
SAO2 % BLDV: 56.2 % — LOW (ref 60–85)
SAO2 % BLDV: 56.7 % — LOW (ref 60–85)
SAO2 % BLDV: 63.6 % — SIGNIFICANT CHANGE UP (ref 60–85)
SAO2 % BLDV: 63.6 % — SIGNIFICANT CHANGE UP (ref 60–85)
SODIUM BLDA-SCNC: 124 MMOL/L — LOW (ref 136–146)
SODIUM BLDA-SCNC: 125 MMOL/L — LOW (ref 136–146)
SODIUM BLDA-SCNC: 127 MMOL/L — LOW (ref 136–146)
SODIUM BLDA-SCNC: 129 MMOL/L — LOW (ref 136–146)
SODIUM SERPL-SCNC: 129 MMOL/L — LOW (ref 135–145)
WBC # BLD: 6.95 K/UL — SIGNIFICANT CHANGE UP (ref 3.8–10.5)
WBC # FLD AUTO: 6.95 K/UL — SIGNIFICANT CHANGE UP (ref 3.8–10.5)

## 2018-05-31 PROCEDURE — 71045 X-RAY EXAM CHEST 1 VIEW: CPT | Mod: 26

## 2018-05-31 PROCEDURE — 99233 SBSQ HOSP IP/OBS HIGH 50: CPT

## 2018-05-31 RX ORDER — METOCLOPRAMIDE HCL 10 MG
10 TABLET ORAL ONCE
Qty: 0 | Refills: 0 | Status: COMPLETED | OUTPATIENT
Start: 2018-05-31 | End: 2018-06-01

## 2018-05-31 RX ORDER — METOCLOPRAMIDE HCL 10 MG
10 TABLET ORAL ONCE
Qty: 0 | Refills: 0 | Status: COMPLETED | OUTPATIENT
Start: 2018-05-31 | End: 2018-05-31

## 2018-05-31 RX ORDER — DOCUSATE SODIUM 100 MG
100 CAPSULE ORAL
Qty: 0 | Refills: 0 | Status: DISCONTINUED | OUTPATIENT
Start: 2018-05-31 | End: 2018-06-09

## 2018-05-31 RX ORDER — WARFARIN SODIUM 2.5 MG/1
3 TABLET ORAL ONCE
Qty: 0 | Refills: 0 | Status: COMPLETED | OUTPATIENT
Start: 2018-05-31 | End: 2018-05-31

## 2018-05-31 RX ORDER — SIMETHICONE 80 MG/1
80 TABLET, CHEWABLE ORAL ONCE
Qty: 0 | Refills: 0 | Status: COMPLETED | OUTPATIENT
Start: 2018-05-31 | End: 2018-05-31

## 2018-05-31 RX ORDER — ONDANSETRON 8 MG/1
4 TABLET, FILM COATED ORAL ONCE
Qty: 0 | Refills: 0 | Status: COMPLETED | OUTPATIENT
Start: 2018-05-31 | End: 2018-05-31

## 2018-05-31 RX ORDER — DOBUTAMINE HCL 250MG/20ML
5 VIAL (ML) INTRAVENOUS
Qty: 500 | Refills: 0 | Status: DISCONTINUED | OUTPATIENT
Start: 2018-05-31 | End: 2018-06-04

## 2018-05-31 RX ORDER — MAGNESIUM OXIDE 400 MG ORAL TABLET 241.3 MG
400 TABLET ORAL
Qty: 0 | Refills: 0 | Status: COMPLETED | OUTPATIENT
Start: 2018-05-31 | End: 2018-05-31

## 2018-05-31 RX ORDER — POTASSIUM CHLORIDE 20 MEQ
20 PACKET (EA) ORAL ONCE
Qty: 0 | Refills: 0 | Status: COMPLETED | OUTPATIENT
Start: 2018-05-31 | End: 2018-05-31

## 2018-05-31 RX ORDER — POTASSIUM CHLORIDE 20 MEQ
40 PACKET (EA) ORAL ONCE
Qty: 0 | Refills: 0 | Status: DISCONTINUED | OUTPATIENT
Start: 2018-05-31 | End: 2018-05-31

## 2018-05-31 RX ORDER — PANTOPRAZOLE SODIUM 20 MG/1
40 TABLET, DELAYED RELEASE ORAL ONCE
Qty: 0 | Refills: 0 | Status: COMPLETED | OUTPATIENT
Start: 2018-05-31 | End: 2018-05-31

## 2018-05-31 RX ADMIN — MAGNESIUM OXIDE 400 MG ORAL TABLET 400 MILLIGRAM(S): 241.3 TABLET ORAL at 11:38

## 2018-05-31 RX ADMIN — CHLORHEXIDINE GLUCONATE 1 APPLICATION(S): 213 SOLUTION TOPICAL at 11:40

## 2018-05-31 RX ADMIN — ATORVASTATIN CALCIUM 80 MILLIGRAM(S): 80 TABLET, FILM COATED ORAL at 22:30

## 2018-05-31 RX ADMIN — SENNA PLUS 2 TABLET(S): 8.6 TABLET ORAL at 22:30

## 2018-05-31 RX ADMIN — Medication 75 MICROGRAM(S): at 06:10

## 2018-05-31 RX ADMIN — ONDANSETRON 4 MILLIGRAM(S): 8 TABLET, FILM COATED ORAL at 05:45

## 2018-05-31 RX ADMIN — WARFARIN SODIUM 3 MILLIGRAM(S): 2.5 TABLET ORAL at 17:33

## 2018-05-31 RX ADMIN — SIMETHICONE 80 MILLIGRAM(S): 80 TABLET, CHEWABLE ORAL at 17:33

## 2018-05-31 RX ADMIN — PANTOPRAZOLE SODIUM 40 MILLIGRAM(S): 20 TABLET, DELAYED RELEASE ORAL at 11:49

## 2018-05-31 RX ADMIN — FINASTERIDE 5 MILLIGRAM(S): 5 TABLET, FILM COATED ORAL at 11:38

## 2018-05-31 RX ADMIN — Medication 100 MILLIGRAM(S): at 17:34

## 2018-05-31 RX ADMIN — Medication 5.33 MICROGRAM(S)/KG/MIN: at 12:30

## 2018-05-31 RX ADMIN — MAGNESIUM OXIDE 400 MG ORAL TABLET 400 MILLIGRAM(S): 241.3 TABLET ORAL at 09:55

## 2018-05-31 RX ADMIN — ONDANSETRON 4 MILLIGRAM(S): 8 TABLET, FILM COATED ORAL at 04:37

## 2018-05-31 RX ADMIN — Medication 10 MILLIGRAM(S): at 06:42

## 2018-05-31 RX ADMIN — SEVELAMER CARBONATE 1600 MILLIGRAM(S): 2400 POWDER, FOR SUSPENSION ORAL at 11:38

## 2018-05-31 RX ADMIN — Medication 20 MILLIEQUIVALENT(S): at 11:39

## 2018-05-31 RX ADMIN — Medication 10.65 MICROGRAM(S)/KG/MIN: at 03:05

## 2018-05-31 RX ADMIN — MAGNESIUM OXIDE 400 MG ORAL TABLET 400 MILLIGRAM(S): 241.3 TABLET ORAL at 17:33

## 2018-05-31 RX ADMIN — Medication 10.65 MICROGRAM(S)/KG/MIN: at 06:56

## 2018-05-31 RX ADMIN — AMIODARONE HYDROCHLORIDE 100 MILLIGRAM(S): 400 TABLET ORAL at 06:10

## 2018-05-31 RX ADMIN — SEVELAMER CARBONATE 1600 MILLIGRAM(S): 2400 POWDER, FOR SUSPENSION ORAL at 17:34

## 2018-05-31 RX ADMIN — Medication 1 SPRAY(S): at 11:40

## 2018-05-31 RX ADMIN — Medication 1 SPRAY(S): at 17:34

## 2018-05-31 RX ADMIN — SEVELAMER CARBONATE 1600 MILLIGRAM(S): 2400 POWDER, FOR SUSPENSION ORAL at 09:54

## 2018-05-31 RX ADMIN — Medication 5 MILLIGRAM(S): at 22:30

## 2018-05-31 NOTE — PROGRESS NOTE ADULT - PROBLEM SELECTOR PLAN 1
s/p HD yesterday, removed 4 kg, tolerated well, uneventful  still hypervolemic. would benefit from extra PUF today, 2.5hrs, uf 3kg as tolerated  and next HD tomorrow w/2k bath, uf 3.5-4kg as tolearted. bp better  low Na 2/2 hypervolemia. chronic non adherance w/fluid restriction   renal diet , fluid restriction 1L/day

## 2018-05-31 NOTE — PROGRESS NOTE ADULT - ASSESSMENT
64 y/o male w/ PMHX of MI in past (Per patient, [facility]) no stents, HFrEF (LVEF 15%, LVIDd 6.0 cm, w/ AICD) severe TR, on chronic home dobutamine 7.5 mcg/kg/min,  ESRD on HD (3-4 x a week), HLD, DM II, a.fib on coumadin, interstitial pulmonary lung disease on chronic home O2, hypotension on midodrine comes in with epistaxis. Epistaxis is now resolved. He was found to be hypervolemic, and was given UF the same night, and HD the next day. He will have HD today as well. He has been admitted 6 times this year for various reasons, but mostly for SOB.  RHC 17 on 2.5 mcg/kg/min of dobutamine 2.5 mcg/kg/min showed RA 25, PCWP 25, PA 61/29/39, PA sat 42.8%, CI 1.65, CO 3.60, PVR 3.84. Currently He is on  7.5 mcg/kg/min. He admits to SOB at rest sometimes, but mostly with minimal exertion (typically walking 10 steps), orthopnea. No CP, palpitations, dizziness or syncope. ICD does not reveal any events. Patient had RHC and showed RA 14-20, PCWP 22, PA 44/18/28, AO sat 92%, PA sat 58.3%, CO 4.66, CI 2.46, -based on these numberse, Dobutamine was decreased to 5 mcg/kg/min on 18. Hemodynamics on - CVP 8, PA sat 63.6%,CO 5.25, CI 2.76.

## 2018-05-31 NOTE — PROGRESS NOTE ADULT - PROBLEM SELECTOR PLAN 1
secondary to a combination of ILD as well as chf: ? Pt is using cpap/ bipap at home: The compliance report is given to the resident on floor today: Pt has not been complaint with machine at home: He used it only for 6 days and that too, for less then 2 hours. Called his wife to get more information, no answer: Left message to call my office. Pt was discharged on bipap last time : Can restart bipap at 12/5 with 40% fio2 in the night while sleeping and prn in the daytime:  5/31: doig same: feels a shade better: can start bipap as mentioned above at night time while sleeping:

## 2018-05-31 NOTE — PROGRESS NOTE ADULT - PROBLEM SELECTOR PLAN 1
-Hemodynamics on 5/31- CVP 8, PA sat 63.6%,CO 5.25, CI 2.76.   -He had 4L fluid removed via HD, net neg 2.5 L.   -BUN/Cr stable 26/3.73. .  -Would decrease Dobutamine to 2.5 mcg/kg/min. No BB while on .  -Unable to tolerate ACEI/ARB/ARNI due to low BP.  -He has not been an advanced HF therapy (LVAD/transplant) candidate in past due to severe interstitial lung disease.   -No objection from HF standpoint to start steroids for treatment of pulmonary fibrosis. We can remove more fluid in HD if fluid retention occurs secondary to steroids.

## 2018-05-31 NOTE — PROGRESS NOTE ADULT - SUBJECTIVE AND OBJECTIVE BOX
Patient seen and examined. He had an episode of vomiting this morning, but has now resolved. He is uncomfortable w/ the groin swan in place, wants it removed because he cant move freely. He does admit to improvement in SOB s/p 4L fluid removal yesterday. No CP, palpitations, dizziness.       Medications:  ALBUTerol/ipratropium for Nebulization 3 milliLiter(s) Nebulizer every 6 hours PRN  amiodarone    Tablet 100 milliGRAM(s) Oral daily  atorvastatin 80 milliGRAM(s) Oral at bedtime  bisacodyl 5 milliGRAM(s) Oral at bedtime  chlorhexidine 4% Liquid 1 Application(s) Topical <User Schedule>  DOBUTamine Infusion 5 MICROgram(s)/kG/Min IV Continuous <Continuous>  docusate sodium 100 milliGRAM(s) Oral two times a day PRN  finasteride 5 milliGRAM(s) Oral daily  levothyroxine 75 MICROGram(s) Oral daily  magnesium oxide 400 milliGRAM(s) Oral three times a day with meals  senna 2 Tablet(s) Oral at bedtime  sevelamer hydrochloride 1600 milliGRAM(s) Oral <User Schedule>  simethicone 80 milliGRAM(s) Chew once  sodium chloride 0.65% Nasal 1 Spray(s) Both Nostrils four times a day  warfarin 3 milliGRAM(s) Oral once      Vitals:  Vital Signs Last 24 Hrs  T(C): 36.7 (31 May 2018 04:00), Max: 36.7 (31 May 2018 00:00)  T(F): 98.1 (31 May 2018 04:00), Max: 98.1 (31 May 2018 00:00)  HR: 89 (31 May 2018 11:00) (69 - 776)  BP: 104/59 (31 May 2018 11:00) (84/51 - 117/54)  BP(mean): 69 (31 May 2018 11:00) (55 - 81)  RR: 17 (31 May 2018 11:00) (14 - 26)  SpO2: 97% (31 May 2018 11:00) (86% - 99%)    Daily     Daily Weight in k.7 (30 May 2018 20:35)    I&O's Detail    30 May 2018 07:01  -  31 May 2018 07:00  --------------------------------------------------------  IN:    DOBUTamine Infusion: 95.4 mL    DOBUTamine Infusion: 96.1 mL    DOBUTamine Infusion: 63.9 mL    Oral Fluid: 715 mL    Other: 500 mL  Total IN: 1470.4 mL    OUT:    Other: 4000 mL  Total OUT: 4000 mL    Total NET: -2529.6 mL          Physical Exam:     General: No distress. Comfortable.  HEENT: EOM intact.  Neck: Neck supple. JVP mildly elevated. No masses  Chest: diffuse rhonchi   CV: Normal S1 and S2. No murmurs, rub, or gallops. Radial pulses normal. No LE edema b/l. Warm today.  Abdomen: Soft, non-distended, non-tender  Skin: No rashes or skin breakdown  Neurology: Alert and oriented times three. Sensation intact  Psych: Affect normal    Labs:                        11.8   6.95  )-----------( 96       ( 31 May 2018 05:07 )             38.2     05-    129<L>  |  88<L>  |  26<H>  ----------------------------<  188<H>  3.6   |  26  |  3.73<H>    Ca    8.5      31 May 2018 05:07  Phos  3.1       Mg     1.9           PT/INR - ( 31 May 2018 05:07 )   PT: 19.6 SEC;   INR: 1.75          PTT - ( 31 May 2018 05:07 )  PTT:47.1 SEC      < from: TTE with Doppler (w/Cont) (.18 @ 08:43) >  1. Mildly dilated left atrium.  LA volume index = 40 cc/m2.  2. Eccentric left ventricular hypertrophy (dilated left  ventricle with normal relative wall thickness).  3. Peak left ventricular outflow tract gradient equals 3.2  mm Hg, mean gradient is equal to 1 mm Hg, LVOT velocity  time integral equals 16 cm. No left ventricular thrombus.  Septal motion consistent with right ventricular overload.  4. Severe right atrial enlargement.   A device wire is  noted in the right heart.  5. Normal tricuspid valve.  Severe tricuspid regurgitation.  6. Estimated pulmonary artery systolic pressure equals 56  mm Hg, assuming right atrial pressure equals 10  mm Hg,  consistent with moderate pulmonary hypertension.  *** Compared with echocardiogram of 10/18/2017, no  significant changes noted.    < end of copied text >

## 2018-05-31 NOTE — PROGRESS NOTE ADULT - SUBJECTIVE AND OBJECTIVE BOX
Note Incomplete  --- Pending Attending Rounds      Briefly: 63yM HFrEF (chronic  gtt), Afib (warfarin), pulm fibrosis, ESRD on HD, here for worsened SOB, [place]-[facility] in place since     Interval Events: This AM became nauseous w non-bloody vomiting x3, got ondansetron 4mg IV x2    Subjective:   GEN no fevers, no chills  RESP breathing better today, no cough  CV no chest pain, no palpitations  GI stomach feels "sour," still passing flatus per rectum, last BM y'day  EXT lower extremities feel less swollen    Physical:  ICU Vital Signs Last 24 Hrs  T(C): 36.7 (31 May 2018 04:00), Max: 36.7 (31 May 2018 00:00)  T(F): 98.1 (31 May 2018 04:00), Max: 98.1 (31 May 2018 00:00)  HR: 85 (31 May 2018 05:00) (68 - 776)  BP: 98/40 (31 May 2018 05:00) (90/51 - 117/54)  BP(mean): 55 (31 May 2018 05:00) (54 - 75)  ABP: --  ABP(mean): --  RR: 24 (31 May 2018 05:00) (0 - 26)  SpO2: 95% (31 May 2018 05:00) (86% - 99%)      Telemetry:     EKG:    Exam:  GEN lying in bed, non-toxic, speaking in short sentences on 5L NC  HENT jvd ~6 cm above top of sternum (improved from yday)  RESP crackles L anterior lung field, R lateral lung field  CHEST R chest-wall port non-tender, non-swollen, non-erythematous  CV irreg irreg, s1 s2  ABD soft, tenderness in epigastric region and RUQ, Altamirano's sign equivocal, no rebound tenderness  EXT R inguinal central catheter site clean/dry/intact/non-swollen/non-tender. LUE PICC/mid-line site clean/dry/intact/non-swollen/non-tender. Feet warm b/l, pitting edema 1+ b/l      LABS:  CAPILLARY BLOOD GLUCOSE        I&O's Summary    29 May 2018 07:01  -  30 May 2018 07:00  --------------------------------------------------------  IN: 1364 mL / OUT: 3800 mL / NET: -2436 mL    30 May 2018 07:01  -  31 May 2018 06:11  --------------------------------------------------------  IN: 1470.4 mL / OUT: 4000 mL / NET: -2529.6 mL                              11.8   6.95  )-----------( 96       ( 31 May 2018 05:07 )             38.2     WBC Trend: 6.95<--, 5.88<--, 6.79<--  0531    129<L>  |  88<L>  |  26<H>  ----------------------------<  188<H>  3.6   |  26  |  3.73<H>    Ca    8.5      31 May 2018 05:07  Phos  3.1     31  Mg     1.9     31      Creatinine Trend: 3.73<--, 5.53<--, 4.51<--, 5.62<--, 4.88<--  PT/INR - ( 31 May 2018 05:07 )   PT: 19.6 SEC;   INR: 1.75          PTT - ( 31 May 2018 05:07 )  PTT:47.1 SEC          Imaging:        MEDICATIONS  (STANDING):  amiodarone    Tablet 100 milliGRAM(s) Oral daily  atorvastatin 80 milliGRAM(s) Oral at bedtime  bisacodyl 5 milliGRAM(s) Oral at bedtime  chlorhexidine 4% Liquid 1 Application(s) Topical <User Schedule>  DOBUTamine Infusion 5 MICROgram(s)/kG/Min (10.65 mL/Hr) IV Continuous <Continuous>  finasteride 5 milliGRAM(s) Oral daily  levothyroxine 75 MICROGram(s) Oral daily  magnesium oxide 400 milliGRAM(s) Oral three times a day with meals  senna 2 Tablet(s) Oral at bedtime  sevelamer hydrochloride 1600 milliGRAM(s) Oral <User Schedule>  sodium chloride 0.65% Nasal 1 Spray(s) Both Nostrils four times a day    MEDICATIONS  (PRN):  ALBUTerol/ipratropium for Nebulization 3 milliLiter(s) Nebulizer every 6 hours PRN Shortness of Breath and/or Wheezing  docusate sodium 100 milliGRAM(s) Oral two times a day PRN Constipation      Consult Recommendations: Note Incomplete  --- Pending Attending Rounds      Briefly: 63yM HFrEF (chronic  gtt), Afib (warfarin), pulm fibrosis, ESRD on HD, here for worsened SOB, [place] in place since     Interval Events: This AM (~05:00) became nauseous w non-bloody vomiting x3 (called RN in to take BiLevel mask off before he vomited), got ondansetron 4mg IV x2    Subjective:   GEN no fevers, no chills  RESP breathing better today, no cough  CV no chest pain, no palpitations  GI stomach feels "sour," still passing flatus per rectum, last BM y'day. Says that he sometimes gets nauseous w vomiting at home, says that it usually self-resolves. Denies having choked on vomitus.   EXT lower extremities feel less swollen    Physical:  ICU Vital Signs Last 24 Hrs  T(C): 36.7 (31 May 2018 04:00), Max: 36.7 (31 May 2018 00:00)  T(F): 98.1 (31 May 2018 04:00), Max: 98.1 (31 May 2018 00:00)  HR: 85 (31 May 2018 05:00) (68 - 776)  BP: 98/40 (31 May 2018 05:00) (90/51 - 117/54)  BP(mean): 55 (31 May 2018 05:00) (54 - 75)  ABP: --  ABP(mean): --  RR: 24 (31 May 2018 05:00) (0 - 26)  SpO2: 95% (31 May 2018 05:00) (86% - 99%)      Telemetry:     EKG:    Exam:  GEN lying in bed, non-toxic, speaking in short sentences on 5L NC  HENT jvd ~6 cm above top of sternum (improved from y'day)  RESP crackles L anterior lung field, R lateral lung field  CHEST R chest-wall port non-tender, non-swollen, non-erythematous  CV irreg irreg, s1 s2  ABD soft, tenderness in epigastric region and RUQ, Altamirano's sign equivocal, no rebound tenderness  EXT R inguinal central catheter site clean/dry/intact/non-swollen/non-tender. LUE PICC/mid-line site clean/dry/intact/non-swollen/non-tender. Feet warm b/l, pitting edema 1+ b/l      LABS:  CAPILLARY BLOOD GLUCOSE        I&O's Summary    29 May 2018 07:01  -  30 May 2018 07:00  --------------------------------------------------------  IN: 1364 mL / OUT: 3800 mL / NET: -2436 mL    30 May 2018 07:01  -  31 May 2018 06:11  --------------------------------------------------------  IN: 1470.4 mL / OUT: 4000 mL / NET: -2529.6 mL                              11.8   6.95  )-----------( 96       ( 31 May 2018 05:07 )             38.2     WBC Trend: 6.95<--, 5.88<--, 6.79<--  05-31    129<L>  |  88<L>  |  26<H>  ----------------------------<  188<H>  3.6   |  26  |  3.73<H>    Ca    8.5      31 May 2018 05:07  Phos  3.1     31  Mg     1.9     31      Creatinine Trend: 3.73<--, 5.53<--, 4.51<--, 5.62<--, 4.88<--  PT/INR - ( 31 May 2018 05:07 )   PT: 19.6 SEC;   INR: 1.75          PTT - ( 31 May 2018 05:07 )  PTT:47.1 SEC          Imaging:        MEDICATIONS  (STANDING):  amiodarone    Tablet 100 milliGRAM(s) Oral daily  atorvastatin 80 milliGRAM(s) Oral at bedtime  bisacodyl 5 milliGRAM(s) Oral at bedtime  chlorhexidine 4% Liquid 1 Application(s) Topical <User Schedule>  DOBUTamine Infusion 5 MICROgram(s)/kG/Min (10.65 mL/Hr) IV Continuous <Continuous>  finasteride 5 milliGRAM(s) Oral daily  levothyroxine 75 MICROGram(s) Oral daily  magnesium oxide 400 milliGRAM(s) Oral three times a day with meals  senna 2 Tablet(s) Oral at bedtime  sevelamer hydrochloride 1600 milliGRAM(s) Oral <User Schedule>  sodium chloride 0.65% Nasal 1 Spray(s) Both Nostrils four times a day    MEDICATIONS  (PRN):  ALBUTerol/ipratropium for Nebulization 3 milliLiter(s) Nebulizer every 6 hours PRN Shortness of Breath and/or Wheezing  docusate sodium 100 milliGRAM(s) Oral two times a day PRN Constipation      Consult Recommendations: Note Incomplete  --- Pending Attending Rounds      Briefly: 63yM HFrEF (chronic  gtt), Afib (warfarin), pulm fibrosis, ESRD on HD, here for worsened SOB, [place]-[facility] in place since     Interval Events: This AM (~05:00) became nauseous w non-bloody vomiting x3 (called RN in to take BiLevel mask off before he vomited), got ondansetron 4mg IV x2    Subjective:   GEN no fevers, no chills  RESP breathing better today, no cough  CV no chest pain, no palpitations  GI stomach feels "sour," still passing flatus per rectum, last BM y'day. Says that he sometimes gets nauseous w vomiting at home, says that it usually self-resolves. Denies having choked on vomitus.   EXT lower extremities feel less swollen  INTEGUMENT interested in having his toes evaluated and possibly clipped    Physical:  ICU Vital Signs Last 24 Hrs  T(C): 36.7 (31 May 2018 04:00), Max: 36.7 (31 May 2018 00:00)  T(F): 98.1 (31 May 2018 04:00), Max: 98.1 (31 May 2018 00:00)  HR: 85 (31 May 2018 05:00) (68 - 776)  BP: 98/40 (31 May 2018 05:00) (90/51 - 117/54)  BP(mean): 55 (31 May 2018 05:00) (54 - 75)  ABP: --  ABP(mean): --  RR: 24 (31 May 2018 05:00) (0 - 26)  SpO2: 95% (31 May 2018 05:00) (86% - 99%)      Telemetry:     EKG:    Exam:  GEN lying in bed, non-toxic, speaking in short sentences on 5L NC  HENT jvd ~6 cm above top of sternum (improved from y'day)  RESP crackles L anterior lung field, R lateral lung field  CHEST R chest-wall port non-tender, non-swollen, non-erythematous  CV irreg irreg, s1 s2  ABD soft, tenderness in epigastric region and RUQ, Altamirano's sign equivocal, no rebound tenderness  EXT R inguinal central catheter site clean/dry/intact/non-swollen/non-tender. LUE PICC/mid-line site clean/dry/intact/non-swollen/non-tender. Feet warm b/l, pitting edema 1+ b/l  INTEGUMENT toenail changes consistent w fungal infection      LABS:  CAPILLARY BLOOD GLUCOSE        I&O's Summary    29 May 2018 07:01  -  30 May 2018 07:00  --------------------------------------------------------  IN: 1364 mL / OUT: 3800 mL / NET: -2436 mL    30 May 2018 07:01  -  31 May 2018 06:11  --------------------------------------------------------  IN: 1470.4 mL / OUT: 4000 mL / NET: -2529.6 mL                              11.8   6.95  )-----------( 96       ( 31 May 2018 05:07 )             38.2     WBC Trend: 6.95<--, 5.88<--, 6.79<--  31    129<L>  |  88<L>  |  26<H>  ----------------------------<  188<H>  3.6   |  26  |  3.73<H>    Ca    8.5      31 May 2018 05:07  Phos  3.1     31  Mg     1.9     31      Creatinine Trend: 3.73<--, 5.53<--, 4.51<--, 5.62<--, 4.88<--  PT/INR - ( 31 May 2018 05:07 )   PT: 19.6 SEC;   INR: 1.75          PTT - ( 31 May 2018 05:07 )  PTT:47.1 SEC          Imaging:        MEDICATIONS  (STANDING):  amiodarone    Tablet 100 milliGRAM(s) Oral daily  atorvastatin 80 milliGRAM(s) Oral at bedtime  bisacodyl 5 milliGRAM(s) Oral at bedtime  chlorhexidine 4% Liquid 1 Application(s) Topical <User Schedule>  DOBUTamine Infusion 5 MICROgram(s)/kG/Min (10.65 mL/Hr) IV Continuous <Continuous>  finasteride 5 milliGRAM(s) Oral daily  levothyroxine 75 MICROGram(s) Oral daily  magnesium oxide 400 milliGRAM(s) Oral three times a day with meals  senna 2 Tablet(s) Oral at bedtime  sevelamer hydrochloride 1600 milliGRAM(s) Oral <User Schedule>  sodium chloride 0.65% Nasal 1 Spray(s) Both Nostrils four times a day    MEDICATIONS  (PRN):  ALBUTerol/ipratropium for Nebulization 3 milliLiter(s) Nebulizer every 6 hours PRN Shortness of Breath and/or Wheezing  docusate sodium 100 milliGRAM(s) Oral two times a day PRN Constipation      Consult Recommendations: Note Incomplete  --- Pending Attending Rounds      Briefly: 63yM HFrEF (chronic  gtt), Afib (warfarin), pulm fibrosis, ESRD on HD, here for worsened SOB, [place]-[facility] in place since     Interval Events: This AM (~05:00) became nauseous w non-bloody vomiting x3 (called RN in to take BiLevel mask off before he vomited), got ondansetron 4mg IV x2    Subjective:   GEN no fevers, no chills  RESP breathing better today, no cough  CV no chest pain, no palpitations  GI stomach feels "sour," still passing flatus per rectum, last BM y'day. Says that he sometimes gets nauseous w vomiting at home, says that it usually self-resolves. Denies having choked on vomitus.   EXT lower extremities feel less swollen  INTEGUMENT interested in having his toes evaluated and possibly clipped    Physical:  ICU Vital Signs Last 24 Hrs  T(C): 36.7 (31 May 2018 04:00), Max: 36.7 (31 May 2018 00:00)  T(F): 98.1 (31 May 2018 04:00), Max: 98.1 (31 May 2018 00:00)  HR: 85 (31 May 2018 05:00) (68 - 776)  BP: 98/40 (31 May 2018 05:00) (90/51 - 117/54)  BP(mean): 55 (31 May 2018 05:00) (54 - 75)  RR: 24 (31 May 2018 05:00) (0 - 26)  SpO2: 95% (31 May 2018 05:00) (86% - 99%)      Telemetry: A-fib rate-controlled, PVCs in singles/pairs/triplets    EKG:    Exam:  GEN lying in bed, non-toxic, speaking in short sentences on 5L NC  HENT jvd ~6 cm above top of sternum (improved from yday)  RESP crackles L anterior lung field, R lateral lung field  CHEST R chest-wall port non-tender, non-swollen, non-erythematous  CV irreg irreg, s1 s2  ABD soft, tenderness in epigastric region and RUQ, Altamirano's sign equivocal, no rebound tenderness  EXT R inguinal central catheter site clean/dry/intact/non-swollen/non-tender. LUE PICC/mid-line site clean/dry/intact/non-swollen/non-tender. Feet warm b/l, pitting edema 1+ b/l  INTEGUMENT toenail changes consistent w fungal infection      LABS:  CAPILLARY BLOOD GLUCOSE        I&O's Summary    29 May 2018 07:01  -  30 May 2018 07:00  --------------------------------------------------------  IN: 1364 mL / OUT: 3800 mL / NET: -2436 mL    30 May 2018 07:  -  31 May 2018 06:11  --------------------------------------------------------  IN: 1470.4 mL / OUT: 4000 mL / NET: -2529.6 mL                              11.8   6.95  )-----------( 96       ( 31 May 2018 05:07 )             38.2     WBC Trend: 6.95<--, 5.88<--, 6.79<--  0531    129<L>  |  88<L>  |  26<H>  ----------------------------<  188<H>  3.6   |  26  |  3.73<H>    Ca    8.5      31 May 2018 05:07  Phos  3.1     31  Mg     1.9     31      Creatinine Trend: 3.73<--, 5.53<--, 4.51<--, 5.62<--, 4.88<--  PT/INR - ( 31 May 2018 05:07 )   PT: 19.6 SEC;   INR: 1.75          PTT - ( 31 May 2018 05:07 )  PTT:47.1 SEC          Imaging:        MEDICATIONS  (STANDING):  amiodarone    Tablet 100 milliGRAM(s) Oral daily  atorvastatin 80 milliGRAM(s) Oral at bedtime  bisacodyl 5 milliGRAM(s) Oral at bedtime  chlorhexidine 4% Liquid 1 Application(s) Topical <User Schedule>  DOBUTamine Infusion 5 MICROgram(s)/kG/Min (10.65 mL/Hr) IV Continuous <Continuous>  finasteride 5 milliGRAM(s) Oral daily  levothyroxine 75 MICROGram(s) Oral daily  magnesium oxide 400 milliGRAM(s) Oral three times a day with meals  senna 2 Tablet(s) Oral at bedtime  sevelamer hydrochloride 1600 milliGRAM(s) Oral <User Schedule>  sodium chloride 0.65% Nasal 1 Spray(s) Both Nostrils four times a day    MEDICATIONS  (PRN):  ALBUTerol/ipratropium for Nebulization 3 milliLiter(s) Nebulizer every 6 hours PRN Shortness of Breath and/or Wheezing  docusate sodium 100 milliGRAM(s) Oral two times a day PRN Constipation      Consult Recommendations: Note Complete      Briefly: 63yM HFrEF (chronic  gtt), Afib (warfarin), pulm fibrosis, ESRD on HD, here for worsened SOB, [place]-Chris in place since     Interval Events: Dobutamine gtt down to 5 since yesterday midday. This AM (~05:00) became nauseous w non-bloody vomiting x3 (called RN in to take BiLevel mask off before he vomited), got ondansetron 4mg IV x2.     Subjective:   GEN no fevers, no chills  RESP breathing better today, no cough  CV no chest pain, no palpitations  GI stomach feels "sour," still passing flatus per rectum, last BM y'day. Says that he sometimes gets nauseous w vomiting at home, says that it usually self-resolves. Denies having choked on vomitus. ***later in afternoon says that abdominal pain is worsening and is cramping-type  EXT lower extremities feel less swollen  INTEGUMENT interested in having his toes evaluated and possibly clipped    Physical:  ICU Vital Signs Last 24 Hrs  T(C): 36.7 (31 May 2018 04:00), Max: 36.7 (31 May 2018 00:00)  T(F): 98.1 (31 May 2018 04:00), Max: 98.1 (31 May 2018 00:00)  HR: 85 (31 May 2018 05:00) (68 - 776)  BP: 98/40 (31 May 2018 05:00) (90/51 - 117/54)  BP(mean): 55 (31 May 2018 05:00) (54 - 75)  RR: 24 (31 May 2018 05:00) (0 - 26)  SpO2: 95% (31 May 2018 05:00) (86% - 99%)      Telemetry: A-fib rate-controlled, PVCs in singles/pairs/triplets    EKG : A-fib w low voltage in extremity leads, occasional PVCs, no ST elevation or depression, grossly similar to prior    Exam:  GEN lying in bed, non-toxic, speaking in short sentences on 5L NC  HENT jvd ~6 cm above top of sternum (improved from y'day)  RESP crackles L anterior lung field, R lateral lung field  CHEST R chest-wall port non-tender, non-swollen, non-erythematous  CV irreg irreg, s1 s2  ABD soft, tenderness in epigastric region and RUQ, Altamirano's sign equivocal, no rebound tenderness  EXT R inguinal central catheter site clean/dry/intact/non-swollen/non-tender. LUE PICC/mid-line site clean/dry/intact/non-swollen/non-tender. Feet warm b/l, pitting edema 1+ b/l  INTEGUMENT toenail changes consistent w fungal infection      LABS:  CAPILLARY BLOOD GLUCOSE        I&O's Summary    29 May 2018 07:01  -  30 May 2018 07:00  --------------------------------------------------------  IN: 1364 mL / OUT: 3800 mL / NET: -2436 mL    30 May 2018 07:01  -  31 May 2018 06:11  --------------------------------------------------------  IN: 1470.4 mL / OUT: 4000 mL / NET: -2529.6 mL                              11.8   6.95  )-----------( 96       ( 31 May 2018 05:07 )             38.2     WBC Trend: 6.95<--, 5.88<--, 6.79<--  31    129<L>  |  88<L>  |  26<H>  ----------------------------<  188<H>  3.6   |  26  |  3.73<H>    Ca    8.5      31 May 2018 05:07  Phos  3.1     31  Mg     1.9     31      Creatinine Trend: 3.73<--, 5.53<--, 4.51<--, 5.62<--, 4.88<--  PT/INR - ( 31 May 2018 05:07 )   PT: 19.6 SEC;   INR: 1.75          PTT - ( 31 May 2018 05:07 )  PTT:47.1 SEC          Imaging:        MEDICATIONS  (STANDING):  amiodarone    Tablet 100 milliGRAM(s) Oral daily  atorvastatin 80 milliGRAM(s) Oral at bedtime  bisacodyl 5 milliGRAM(s) Oral at bedtime  chlorhexidine 4% Liquid 1 Application(s) Topical <User Schedule>  DOBUTamine Infusion 5 MICROgram(s)/kG/Min (10.65 mL/Hr) IV Continuous <Continuous>  finasteride 5 milliGRAM(s) Oral daily  levothyroxine 75 MICROGram(s) Oral daily  magnesium oxide 400 milliGRAM(s) Oral three times a day with meals  senna 2 Tablet(s) Oral at bedtime  sevelamer hydrochloride 1600 milliGRAM(s) Oral <User Schedule>  sodium chloride 0.65% Nasal 1 Spray(s) Both Nostrils four times a day    MEDICATIONS  (PRN):  ALBUTerol/ipratropium for Nebulization 3 milliLiter(s) Nebulizer every 6 hours PRN Shortness of Breath and/or Wheezing  docusate sodium 100 milliGRAM(s) Oral two times a day PRN Constipation      Consult Recommendations:

## 2018-05-31 NOTE — PROGRESS NOTE ADULT - ATTENDING COMMENTS
Continue aggressive daily dialysis.  Continue  at 2.5 mcg/kg/min.  Continue PA catheter monitoring. Continue aggressive daily dialysis.  Continue  at 2.5 mcg/kg/min.  Continue PA catheter monitoring.    Addendum:  Pt c/o 6/10 abdominal pain starting today, which has occurred previously.  Abdomen soft, mildly tender. Had BM yesterday. ?mesenteric ischemia.  CVP~12 (done at bedside). PA sat has dropped from 64 to 56% on lower dose of .  Will increase  to 5 mcg/kg/min and monitor effect on belly pain.

## 2018-05-31 NOTE — PROGRESS NOTE ADULT - SUBJECTIVE AND OBJECTIVE BOX
[facility] - Mark / Khai S /Jennifer/ VITO García/ VITO Leigh/ Kolton Farr / LISSA Njeru  ---------------------------------------------------------------------------------------------------------------    Patient seen and examined bedside, in CCU    Subjective and Objective: admits to improvement in SOB, s/p 4L fluid removed w/HD yesterday. No CP, palpitations,   on BiPAP overnight    Allergies: No Known Allergies      Hospital Medications:   MEDICATIONS  (STANDING):  amiodarone    Tablet 100 milliGRAM(s) Oral daily  atorvastatin 80 milliGRAM(s) Oral at bedtime  bisacodyl 5 milliGRAM(s) Oral at bedtime  chlorhexidine 4% Liquid 1 Application(s) Topical <User Schedule>  DOBUTamine Infusion 5 MICROgram(s)/kG/Min (10.65 mL/Hr) IV Continuous <Continuous>  docusate sodium 100 milliGRAM(s) Oral two times a day  finasteride 5 milliGRAM(s) Oral daily  levothyroxine 75 MICROGram(s) Oral daily  senna 2 Tablet(s) Oral at bedtime  sevelamer hydrochloride 1600 milliGRAM(s) Oral <User Schedule>  sodium chloride 0.65% Nasal 1 Spray(s) Both Nostrils four times a day    VITALS:  T(F): 97.3 (05-31-18 @ 16:00), Max: 98.1 (05-31-18 @ 00:00)  HR: 85 (05-31-18 @ 18:00)  BP: 118/68 (05-31-18 @ 18:00)  RR: 19 (05-31-18 @ 18:00)  SpO2: 96% (05-31-18 @ 18:00)  Wt(kg): --    05-30 @ 07:01  -  05-31 @ 07:00  --------------------------------------------------------  IN: 1470.4 mL / OUT: 4000 mL / NET: -2529.6 mL    05-31 @ 07:01  -  05-31 @ 18:17  --------------------------------------------------------  IN: 79.7 mL / OUT: 0 mL / NET: 79.7 mL      PHYSICAL EXAM:  Constitutional: NAD  HEENT: anicteric sclera, oropharynx clear  Neck: No JVD  Respiratory: bibasilar crepts  Cardiovascular: S1, S2, RRR  Gastrointestinal: BS+, soft, NT/ND  Extremities: No cyanosis or clubbing. No peripheral edema  Neurological: A/O x 3, no focal deficits  Psychiatric: Normal mood, normal affect  : No CVA tenderness. No rosa.   Skin: No rashes  Vascular Access: rt IJ PC    LABS:  05-31    129<L>  |  88<L>  |  26<H>  ----------------------------<  188<H>  3.6   |  26  |  3.73<H>    Ca    8.5      31 May 2018 05:07  Phos  3.1     05-31  Mg     1.9     05-31    TPro  7.9  /  Alb  3.2<L>  /  TBili  0.4  /  DBili  0.2  /  AST  28  /  ALT  24  /  AlkPhos  206<H>  05-31    Creatinine Trend: 3.73 <--, 5.53 <--, 4.51 <--, 5.62 <--, 4.88 <--                        11.8   6.95  )-----------( 96       ( 31 May 2018 05:07 )             38.2     Urine Studies:        RADIOLOGY & ADDITIONAL STUDIES:

## 2018-05-31 NOTE — CONSULT NOTE ADULT - ASSESSMENT
63 year old male with onychomycosis bilateral feet  -aseptic debridement of elongated nails x 10 without incident   -Patient has out patient Podiatrist (Dr. Monahan) - follow up on DC for routine care  -No other acute podiatric issues at this time  -Z floats while in bed  -Please reconsult as needed

## 2018-05-31 NOTE — PROGRESS NOTE ADULT - SUBJECTIVE AND OBJECTIVE BOX
Patient is a 63y old  Male who presents with a chief complaint of SOB (29 May 2018 15:55)      Any change in ROS: Doing ok : no SOb at rest:  no cough :     MEDICATIONS  (STANDING):  amiodarone    Tablet 100 milliGRAM(s) Oral daily  atorvastatin 80 milliGRAM(s) Oral at bedtime  bisacodyl 5 milliGRAM(s) Oral at bedtime  chlorhexidine 4% Liquid 1 Application(s) Topical <User Schedule>  DOBUTamine Infusion 5 MICROgram(s)/kG/Min (10.65 mL/Hr) IV Continuous <Continuous>  finasteride 5 milliGRAM(s) Oral daily  levothyroxine 75 MICROGram(s) Oral daily  magnesium oxide 400 milliGRAM(s) Oral three times a day with meals  senna 2 Tablet(s) Oral at bedtime  sevelamer hydrochloride 1600 milliGRAM(s) Oral <User Schedule>  simethicone 80 milliGRAM(s) Chew once  sodium chloride 0.65% Nasal 1 Spray(s) Both Nostrils four times a day  warfarin 3 milliGRAM(s) Oral once    MEDICATIONS  (PRN):  ALBUTerol/ipratropium for Nebulization 3 milliLiter(s) Nebulizer every 6 hours PRN Shortness of Breath and/or Wheezing  docusate sodium 100 milliGRAM(s) Oral two times a day PRN Constipation    Vital Signs Last 24 Hrs  T(C): 36.7 (31 May 2018 04:00), Max: 36.7 (31 May 2018 00:00)  T(F): 98.1 (31 May 2018 04:00), Max: 98.1 (31 May 2018 00:00)  HR: 89 (31 May 2018 11:00) (69 - 776)  BP: 104/59 (31 May 2018 11:00) (84/51 - 117/54)  BP(mean): 69 (31 May 2018 11:00) (55 - 81)  RR: 17 (31 May 2018 11:00) (14 - 26)  SpO2: 97% (31 May 2018 11:00) (86% - 99%)    I&O's Summary    30 May 2018 07:01  -  31 May 2018 07:00  --------------------------------------------------------  IN: 1470.4 mL / OUT: 4000 mL / NET: -2529.6 mL          Physical Exam:   GENERAL: NAD, well-groomed, well-developed  HEENT: BELL/   Atraumatic, Normocephalic  ENMT: No tonsillar erythema, exudates, or enlargement; Moist mucous membranes, Good dentition, No lesions  NECK: Supple, No JVD, Normal thyroid  CHEST/LUNG: Clear to auscultaion  CVS: Regular rate and rhythm; No murmurs, rubs, or gallops  GI: : Soft, Nontender, Nondistended; Bowel sounds present  NERVOUS SYSTEM:  Alert & Oriented X3  EXTREMITIES:  + edema  LYMPH: No lymphadenopathy noted  SKIN: No rashes or lesions  ENDOCRINOLOGY: No Thyromegaly  PSYCH: Appropriate    Labs:  ABG - ( 31 May 2018 09:59 )  pH, Arterial: 7.35  pH, Blood: x     /  pCO2: 50    /  pO2: 85    / HCO3: 25    / Base Excess: 1.5   /  SaO2: 95.4            24, 24, 27, 23, 24                            11.8   6.95  )-----------( 96       ( 31 May 2018 05:07 )             38.2                         12.0   5.88  )-----------( 92       ( 30 May 2018 05:45 )             39.7                         11.3   6.79  )-----------( 97       ( 29 May 2018 04:00 )             37.5                         11.6   6.45  )-----------( 101      ( 28 May 2018 02:57 )             38.0     05-31    129<L>  |  88<L>  |  26<H>  ----------------------------<  188<H>  3.6   |  26  |  3.73<H>  05-30    129<L>  |  91<L>  |  48<H>  ----------------------------<  111<H>  4.4   |  23  |  5.53<H>  05-29    133<L>  |  93<L>  |  34<H>  ----------------------------<  130<H>  4.3   |  27  |  4.51<H>  05-28    131<L>  |  91<L>  |  50<H>  ----------------------------<  133<H>  4.4   |  24  |  5.62<H>    Ca    8.5      31 May 2018 05:07  Ca    8.7      30 May 2018 05:45  Phos  3.1     05-31  Phos  4.0     05-30  Mg     1.9     05-31  Mg     2.2     05-30    TPro  8.2  /  Alb  3.3  /  TBili  0.5  /  DBili  x   /  AST  29  /  ALT  28  /  AlkPhos  211<H>  05-28    CAPILLARY BLOOD GLUCOSE            PT/INR - ( 31 May 2018 05:07 )   PT: 19.6 SEC;   INR: 1.75          PTT - ( 31 May 2018 05:07 )  PTT:47.1 SEC          RECENT CULTURES:        RESPIRATORY CULTURES:          Studies  Chest X-RAY  CT SCAN Chest   Venous Dopplers: LE:   CT Abdomen  Others      < from: Xray Chest 1 View- PORTABLE-Urgent (05.30.18 @ 11:23) >  May 30 2018         INTERPRETATION:    Portable chest xray: [place] placement    Comparison: Most recent prior     FINDINGS:    Lines/Tubes: A new [place]-Chris catheter seen with its tip in the proximal   right pulmonary artery    Heart and mediastinum:  Unremarkable.    Lungs, pleura, and airways: Pulmonary edema and right effusion are   unchanged. No pneumothorax    Bones and soft tissues: The bones and soft tissues are unchanged.    Impression:    A new [place]-Chris catheter seen with its tip in the proximal right   pulmonary artery    Pulmonary edema and right effusion are unchanged. No pneumothorax                  RADHA ROBINS M.D., ATTENDING RADIOLOGIST  This document has been electronically signed. May 30 2018 11:28AM    < end of copied text >  < from: Xray Chest 1 View- PORTABLE-Urgent (05.30.18 @ 11:23) >  May 30 2018         INTERPRETATION:    Portable chest xray: [place] placement    Comparison: Most recent prior     FINDINGS:    Lines/Tubes: A new [place]-Chris catheter seen with its tip in the proximal   right pulmonary artery    Heart and mediastinum:  Unremarkable.    Lungs, pleura, and airways: Pulmonary edema and right effusion are   unchanged. No pneumothorax    Bones and soft tissues: The bones and soft tissues are unchanged.    Impression:    A new [place]-Chris catheter seen with its tip in the proximal right   pulmonary artery    Pulmonary edema and right effusion are unchanged. No pneumothorax                  RADHA ROBINS M.D., ATTENDING RADIOLOGIST  This document has been electronically signed. May 30 2018 11:28AM    < end of copied text >

## 2018-05-31 NOTE — PROGRESS NOTE ADULT - PROBLEM SELECTOR PLAN 9
bleed that prompted admission resolved w pressure.   - cont humidified O2  - start nasal spray during day to keep nares moist  - called home O2 supplier at [phone]; they said that they would provide pt/family with attachment(s) to permit humidification of home O2

## 2018-05-31 NOTE — CONSULT NOTE ADULT - SUBJECTIVE AND OBJECTIVE BOX
HPI:  Patient seen and examined at the bedside. Patient transferred to general medical floor with significant dyspnea which manifests with minimal activity. Therefore, comprehensive HPI was not possible to elicit at this time. Briefly, 64 y/o M PMHx significant for severe CHF, on chronic dobutamine gtt at home x 3 years (follows up with Dr. Davis), AFib on coumadin, AICD, ESRD on HD MWF (plus add'l session on Saturday prn), COPD (on home O2), pulmonary fibrosis, presents to the ED with chief complaint of epistaxis that started yesterday. Patient reports intermittent nose bleed that responded to direct pressure. However, bleeding would soon recur afterwards. He also reports chronic SOB with minimal exertion, such as transferring himself between chairs and minimal walking. He says this has been bothering him "all the time." No CP, cough or fever. Patient had an additional round of HD yesterday.     In the ED, vitals were: Temp 97.5F, 70s-90s SBP, HR WNL, and tachypnea to RR 24. Patient given midodrine 10mg x 1. (27 May 2018 17:23)    Patient is a 63y old  Male who presents with a chief complaint of SOB (29 May 2018 15:55)    Allergies    No Known Allergies    Intolerances      Vital Signs Last 24 Hrs  T(C): 36.7 (31 May 2018 04:00), Max: 36.7 (31 May 2018 00:00)  T(F): 98.1 (31 May 2018 04:00), Max: 98.1 (31 May 2018 00:00)  HR: 83 (31 May 2018 13:00) (69 - 776)  BP: 101/56 (31 May 2018 13:00) (84/51 - 109/64)  BP(mean): 63 (31 May 2018 13:00) (55 - 81)  RR: 20 (31 May 2018 13:00) (14 - 26)  SpO2: 95% (31 May 2018 13:00) (86% - 99%)                        11.8   6.95  )-----------( 96       ( 31 May 2018 05:07 )             38.2     05-31    129<L>  |  88<L>  |  26<H>  ----------------------------<  188<H>  3.6   |  26  |  3.73<H>    Ca    8.5      31 May 2018 05:07  Phos  3.1     05-31  Mg     1.9     05-31    TPro  7.9  /  Alb  3.2<L>  /  TBili  0.4  /  DBili  0.2  /  AST  28  /  ALT  24  /  AlkPhos  206<H>  05-31    CAPILLARY BLOOD GLUCOSE        MEDICATIONS  (STANDING):  amiodarone    Tablet 100 milliGRAM(s) Oral daily  atorvastatin 80 milliGRAM(s) Oral at bedtime  bisacodyl 5 milliGRAM(s) Oral at bedtime  chlorhexidine 4% Liquid 1 Application(s) Topical <User Schedule>  DOBUTamine Infusion 2.5 MICROgram(s)/kG/Min (5.325 mL/Hr) IV Continuous <Continuous>  docusate sodium 100 milliGRAM(s) Oral two times a day  finasteride 5 milliGRAM(s) Oral daily  levothyroxine 75 MICROGram(s) Oral daily  magnesium oxide 400 milliGRAM(s) Oral three times a day with meals  senna 2 Tablet(s) Oral at bedtime  sevelamer hydrochloride 1600 milliGRAM(s) Oral <User Schedule>  simethicone 80 milliGRAM(s) Chew once  sodium chloride 0.65% Nasal 1 Spray(s) Both Nostrils four times a day  warfarin 3 milliGRAM(s) Oral once    MEDICATIONS  (PRN):  ALBUTerol/ipratropium for Nebulization 3 milliLiter(s) Nebulizer every 6 hours PRN Shortness of Breath and/or Wheezing    PAST MEDICAL & SURGICAL HISTORY:  Seizure: Off Keppra since 7/2017  Chronic hypotension  AF (atrial fibrillation)  COPD (chronic obstructive pulmonary disease): 4L home O2  HLD (hyperlipidemia)  DM (diabetes mellitus): Off Insulin since 7/2017  ESRD (end stage renal disease) on dialysis  BPH (benign prostatic hypertrophy)  Myocardial infarction: 10/2011  Gout  CHF (congestive heart failure)  AICD (automatic cardioverter/defibrillator) present: Biot[employer] - placed 9/11/09  H/O coronary angiogram      ABDIFATAH:  Elongated, thicnkened toe nails x 10  No open lesions or local signs of infection  Severe flat foot bilaterally  Pedal pulses palp 2/4 bilaterally  Sensation intact

## 2018-05-31 NOTE — PROGRESS NOTE ADULT - PROBLEM SELECTOR PLAN 2
Patient with EF 15% on 5/28 TTE, down from 21% on 10/17 TTE. AICD in place. On dobutamine gtt x3yrs. S/p midodrine in the ED, no need for IV pressors in CCU. RHC 5/30 AM.   - C/w dobutamine 5 mcg/kg/min, wean as tolerated per HF recs  - Give midodrine 10mg one hour before HD, not otherwise   - BP in lower extremities is significantly higher than in upper extremities, consistent w prior CCU admission; we think that LE BP is likely more accurate than UE BP. F/u VA duplex of UEs  - appreciate Heart-Failure recs

## 2018-05-31 NOTE — PROGRESS NOTE ADULT - ASSESSMENT
63yM w severe HFrEF (on dobutamine gtt x3 yrs) w [employer] AICD, A-fib (on warfarin), ESRD on HD MWF (+Sat PRN), pulmonary fibrosis, on 5/27 p/w epistaxis x1 day, also w acute-on-chronic SOB, found to be volume-overloaded despite reported med compliance, no dietary changes, and having gotten extra session of HD the day prior to admission. SBP 80s-90s in UEs, so to be monitored while undergoing HD was transferred to CCU, where we are attempting to optimize his cardiopulmonary status, including w [place]-Chris catheter (5/30--> ) and calculations of cardiac output using Cj method (thermodilution unreliable due to valvular regurgitation).

## 2018-06-01 LAB
APTT BLD: 53.2 SEC — HIGH (ref 27.5–37.4)
BASE EXCESS BLDV CALC-SCNC: 3.7 MMOL/L — SIGNIFICANT CHANGE UP
BASE EXCESS BLDV CALC-SCNC: 3.7 MMOL/L — SIGNIFICANT CHANGE UP
BUN SERPL-MCNC: 34 MG/DL — HIGH (ref 7–23)
CALCIUM SERPL-MCNC: 9 MG/DL — SIGNIFICANT CHANGE UP (ref 8.4–10.5)
CHLORIDE SERPL-SCNC: 89 MMOL/L — LOW (ref 98–107)
CO2 SERPL-SCNC: 23 MMOL/L — SIGNIFICANT CHANGE UP (ref 22–31)
CREAT SERPL-MCNC: 4.87 MG/DL — HIGH (ref 0.5–1.3)
GAS PNL BLDV: 129 MMOL/L — LOW (ref 136–146)
GLUCOSE BLDV-MCNC: 183 — HIGH (ref 70–99)
GLUCOSE SERPL-MCNC: 155 MG/DL — HIGH (ref 70–99)
HCO3 BLDV-SCNC: 26 MMOL/L — SIGNIFICANT CHANGE UP (ref 20–27)
HCO3 BLDV-SCNC: 26 MMOL/L — SIGNIFICANT CHANGE UP (ref 20–27)
HCT VFR BLD CALC: 41.1 % — SIGNIFICANT CHANGE UP (ref 39–50)
HCT VFR BLDV CALC: 41.4 % — SIGNIFICANT CHANGE UP (ref 39–51)
HGB BLD-MCNC: 12.7 G/DL — LOW (ref 13–17)
HGB BLDV-MCNC: 13.5 G/DL — SIGNIFICANT CHANGE UP (ref 13–17)
INR BLD: 2.31 — HIGH (ref 0.88–1.17)
MAGNESIUM SERPL-MCNC: 2.2 MG/DL — SIGNIFICANT CHANGE UP (ref 1.6–2.6)
MCHC RBC-ENTMCNC: 30.8 PG — SIGNIFICANT CHANGE UP (ref 27–34)
MCHC RBC-ENTMCNC: 30.9 % — LOW (ref 32–36)
MCV RBC AUTO: 99.8 FL — SIGNIFICANT CHANGE UP (ref 80–100)
NRBC # FLD: 0 — SIGNIFICANT CHANGE UP
PCO2 BLDV: 56 MMHG — HIGH (ref 41–51)
PCO2 BLDV: 58 MMHG — HIGH (ref 41–51)
PH BLDV: 7.33 PH — SIGNIFICANT CHANGE UP (ref 7.32–7.43)
PH BLDV: 7.34 PH — SIGNIFICANT CHANGE UP (ref 7.32–7.43)
PHOSPHATE SERPL-MCNC: 4 MG/DL — SIGNIFICANT CHANGE UP (ref 2.5–4.5)
PLATELET # BLD AUTO: 96 K/UL — LOW (ref 150–400)
PMV BLD: 12.7 FL — SIGNIFICANT CHANGE UP (ref 7–13)
PO2 BLDV: 35 MMHG — SIGNIFICANT CHANGE UP (ref 35–40)
PO2 BLDV: 41 MMHG — HIGH (ref 35–40)
POTASSIUM BLDV-SCNC: 3.9 MMOL/L — SIGNIFICANT CHANGE UP (ref 3.4–4.5)
POTASSIUM SERPL-MCNC: 4.9 MMOL/L — SIGNIFICANT CHANGE UP (ref 3.5–5.3)
POTASSIUM SERPL-SCNC: 4.9 MMOL/L — SIGNIFICANT CHANGE UP (ref 3.5–5.3)
PROTHROM AB SERPL-ACNC: 26.1 SEC — HIGH (ref 9.8–13.1)
RBC # BLD: 4.12 M/UL — LOW (ref 4.2–5.8)
RBC # FLD: 15.3 % — HIGH (ref 10.3–14.5)
SAO2 % BLDV: 62.7 % — SIGNIFICANT CHANGE UP (ref 60–85)
SAO2 % BLDV: 70.5 % — SIGNIFICANT CHANGE UP (ref 60–85)
SODIUM SERPL-SCNC: 127 MMOL/L — LOW (ref 135–145)
WBC # BLD: 11.45 K/UL — HIGH (ref 3.8–10.5)
WBC # FLD AUTO: 11.45 K/UL — HIGH (ref 3.8–10.5)

## 2018-06-01 PROCEDURE — 99233 SBSQ HOSP IP/OBS HIGH 50: CPT

## 2018-06-01 PROCEDURE — 71045 X-RAY EXAM CHEST 1 VIEW: CPT | Mod: 26

## 2018-06-01 RX ORDER — MIDODRINE HYDROCHLORIDE 2.5 MG/1
10 TABLET ORAL ONCE
Qty: 0 | Refills: 0 | Status: COMPLETED | OUTPATIENT
Start: 2018-06-01 | End: 2018-06-01

## 2018-06-01 RX ORDER — PANTOPRAZOLE SODIUM 20 MG/1
40 TABLET, DELAYED RELEASE ORAL ONCE
Qty: 0 | Refills: 0 | Status: COMPLETED | OUTPATIENT
Start: 2018-06-01 | End: 2018-06-01

## 2018-06-01 RX ORDER — ONDANSETRON 8 MG/1
4 TABLET, FILM COATED ORAL ONCE
Qty: 0 | Refills: 0 | Status: COMPLETED | OUTPATIENT
Start: 2018-06-01 | End: 2018-06-01

## 2018-06-01 RX ORDER — SIMETHICONE 80 MG/1
80 TABLET, CHEWABLE ORAL ONCE
Qty: 0 | Refills: 0 | Status: COMPLETED | OUTPATIENT
Start: 2018-06-01 | End: 2018-06-01

## 2018-06-01 RX ORDER — PANTOPRAZOLE SODIUM 20 MG/1
40 TABLET, DELAYED RELEASE ORAL
Qty: 0 | Refills: 0 | Status: DISCONTINUED | OUTPATIENT
Start: 2018-06-02 | End: 2018-06-09

## 2018-06-01 RX ORDER — ACETAMINOPHEN 500 MG
975 TABLET ORAL ONCE
Qty: 0 | Refills: 0 | Status: COMPLETED | OUTPATIENT
Start: 2018-06-01 | End: 2018-06-01

## 2018-06-01 RX ORDER — WARFARIN SODIUM 2.5 MG/1
3 TABLET ORAL ONCE
Qty: 0 | Refills: 0 | Status: COMPLETED | OUTPATIENT
Start: 2018-06-01 | End: 2018-06-01

## 2018-06-01 RX ADMIN — FINASTERIDE 5 MILLIGRAM(S): 5 TABLET, FILM COATED ORAL at 11:52

## 2018-06-01 RX ADMIN — MIDODRINE HYDROCHLORIDE 10 MILLIGRAM(S): 2.5 TABLET ORAL at 07:21

## 2018-06-01 RX ADMIN — ATORVASTATIN CALCIUM 80 MILLIGRAM(S): 80 TABLET, FILM COATED ORAL at 22:38

## 2018-06-01 RX ADMIN — SEVELAMER CARBONATE 1600 MILLIGRAM(S): 2400 POWDER, FOR SUSPENSION ORAL at 11:49

## 2018-06-01 RX ADMIN — SENNA PLUS 2 TABLET(S): 8.6 TABLET ORAL at 22:38

## 2018-06-01 RX ADMIN — Medication 1 SPRAY(S): at 11:48

## 2018-06-01 RX ADMIN — SIMETHICONE 80 MILLIGRAM(S): 80 TABLET, CHEWABLE ORAL at 09:07

## 2018-06-01 RX ADMIN — Medication 5 MILLIGRAM(S): at 22:38

## 2018-06-01 RX ADMIN — ONDANSETRON 4 MILLIGRAM(S): 8 TABLET, FILM COATED ORAL at 02:08

## 2018-06-01 RX ADMIN — Medication 10 MILLIGRAM(S): at 00:11

## 2018-06-01 RX ADMIN — SEVELAMER CARBONATE 1600 MILLIGRAM(S): 2400 POWDER, FOR SUSPENSION ORAL at 17:11

## 2018-06-01 RX ADMIN — Medication 975 MILLIGRAM(S): at 09:08

## 2018-06-01 RX ADMIN — Medication 75 MICROGRAM(S): at 06:01

## 2018-06-01 RX ADMIN — CHLORHEXIDINE GLUCONATE 1 APPLICATION(S): 213 SOLUTION TOPICAL at 11:49

## 2018-06-01 RX ADMIN — Medication 100 MILLIGRAM(S): at 17:11

## 2018-06-01 RX ADMIN — AMIODARONE HYDROCHLORIDE 100 MILLIGRAM(S): 400 TABLET ORAL at 06:01

## 2018-06-01 RX ADMIN — WARFARIN SODIUM 3 MILLIGRAM(S): 2.5 TABLET ORAL at 17:11

## 2018-06-01 RX ADMIN — Medication 1 SPRAY(S): at 17:12

## 2018-06-01 RX ADMIN — PANTOPRAZOLE SODIUM 40 MILLIGRAM(S): 20 TABLET, DELAYED RELEASE ORAL at 09:07

## 2018-06-01 RX ADMIN — Medication 10.65 MICROGRAM(S)/KG/MIN: at 00:31

## 2018-06-01 RX ADMIN — Medication 975 MILLIGRAM(S): at 11:24

## 2018-06-01 NOTE — PROGRESS NOTE ADULT - SUBJECTIVE AND OBJECTIVE BOX
Note Incomplete  --- Pending Attending Rounds      Briefly: 63M severe HFrEF (chronic  gtt), Afib (warfarin), Pulm Fibrosis, ESRD (HD 3-4/wk), DM (off insulin), a/w worsened SOB, titrating     Interval Events:  - Underwent ultrafiltration y'day w net removal of 3 L   - Around 19:00,  was increased from 2.5 to 5. Around 23:00, by Cj method CO 3.34, CI 1.77.    - When BiLevel was removed overnight pt desaturated despite nasal cannula.     Subjective:   GEN no fevers, no chills  CHEST R chest-wall Perma-cath site not burning this morning  RESP breathing feels OK on NC 5L/min this AM  CV no chest pain, no palpitations  GI achy abdominal pain, nausea. No BM y'day.   EXT asks to have [place]-Chris catheter removed from thigh    Physical:  ICU Vital Signs Last 24 Hrs  T(C): 37.4 (2018 04:00), Max: 37.6 (31 May 2018 20:00)  T(F): 99.3 (2018 04:00), Max: 99.7 (31 May 2018 20:00)  HR: 93 (2018 07:08) (76 - 108)  BP: 100/63 (2018 07:00) (87/52 - 126/63)  BP(mean): 69 (2018 07:00) (59 - 89)  ABP: --  ABP(mean): --  RR: 12 (2018 07:00) (11 - 23)  SpO2: 96% (2018 07:08) (81% - 98%)      Telemetry:     EKG:    Exam:  GEN no acute distress, non-toxic-appearing, fatigued  NECK jvd ~6 cm above top of sternum  RESP L anterior lung field w crackles, R anterior lung field clear, b/l lateral lung fields w crackles. Posterior lung fields not auscultated due to placement of R groin central line  ABD R-sided abdominal tenderness w guarding, no rebound tenderness  EXT lower-extremity pitting edema 1+, feet not very warm but DP pulses palpable b/l      LABS:  CAPILLARY BLOOD GLUCOSE        I&O's Summary    31 May 2018 07:01  -  2018 07:00  --------------------------------------------------------  IN: 738.2 mL / OUT: 3400 mL / NET: -2661.8 mL                              12.7   11.45 )-----------( 96       ( 2018 02:50 )             41.1     WBC Trend: 11.45<--, 6.95<--, 5.88<--  06-01    127<L>  |  89<L>  |  34<H>  ----------------------------<  155<H>  4.9   |  23  |  4.87<H>    Ca    9.0      2018 02:50  Phos  4.0     06-  Mg     2.2     06-    TPro  7.9  /  Alb  3.2<L>  /  TBili  0.4  /  DBili  0.2  /  AST  28  /  ALT  24  /  AlkPhos  206<H>  05-31    Creatinine Trend: 4.87<--, 3.73<--, 5.53<--, 4.51<--, 5.62<--, 4.88<--  PT/INR - ( 2018 02:50 )   PT: 26.1 SEC;   INR: 2.31          PTT - ( 2018 02:50 )  PTT:53.2 SEC          Imaging:        MEDICATIONS  (STANDING):  amiodarone    Tablet 100 milliGRAM(s) Oral daily  atorvastatin 80 milliGRAM(s) Oral at bedtime  bisacodyl 5 milliGRAM(s) Oral at bedtime  chlorhexidine 4% Liquid 1 Application(s) Topical <User Schedule>  DOBUTamine Infusion 5 MICROgram(s)/kG/Min (10.65 mL/Hr) IV Continuous <Continuous>  docusate sodium 100 milliGRAM(s) Oral two times a day  finasteride 5 milliGRAM(s) Oral daily  levothyroxine 75 MICROGram(s) Oral daily  senna 2 Tablet(s) Oral at bedtime  sevelamer hydrochloride 1600 milliGRAM(s) Oral <User Schedule>  sodium chloride 0.65% Nasal 1 Spray(s) Both Nostrils four times a day    MEDICATIONS  (PRN):  ALBUTerol/ipratropium for Nebulization 3 milliLiter(s) Nebulizer every 6 hours PRN Shortness of Breath and/or Wheezing      Consult Recommendations: Note Incomplete  --- Pending Attending Rounds      Briefly: 63M severe HFrEF (chronic  gtt), Afib (warfarin), Pulm Fibrosis, ESRD (HD 3-4/wk), DM (off insulin), a/w worsened SOB, titrating     Interval Events:  - Underwent ultrafiltration y'day w net removal of 3 L   - Around 19:00,  was increased from 2.5 to 5. Around 23:00, by Cj method CO 3.34, CI 1.77.    - When BiLevel was removed overnight pt desaturated despite nasal cannula.     Subjective:   GEN no fevers, no chills  CHEST R chest-wall Perma-cath site not burning this morning  RESP breathing feels OK on NC 5L/min this AM  CV no chest pain, no palpitations  GI achy abdominal pain, nausea. No BM y'day.   EXT asks to have [place]-Chris catheter removed from thigh    Physical:  ICU Vital Signs Last 24 Hrs  T(C): 37.4 (2018 04:00), Max: 37.6 (31 May 2018 20:00)  T(F): 99.3 (2018 04:00), Max: 99.7 (31 May 2018 20:00)  HR: 93 (2018 07:08) (76 - 108)  BP: 100/63 (2018 07:00) (87/52 - 126/63)  BP(mean): 69 (2018 07:00) (59 - 89)  ABP: --  ABP(mean): --  RR: 12 (2018 07:00) (11 - 23)  SpO2: 96% (2018 07:08) (81% - 98%)      Telemetry: Atrial fibrillation w rate control, multiple PVC triplets, one run of NSVT ~10 beats    EKG :   - A-fib, HR 95, LAD, QRS narrow, QTc 479 per computerized calculation  - Borderline-low voltage in extremity leads  - q-waves in aVL (<1mm). [pRWP  - No ST elevations or depressions  - T-wave inversions in aVL  - Impression: A-fib w LAD, rate-controlled, no acute ischemia  - Compared to prior, T-wave inversions in aVL more prominent (in recent EKGs aVL T-waves appear flat to mildly inverted)    Exam:  GEN no acute distress, non-toxic-appearing, fatigued  NECK jvd ~6 cm above top of sternum  RESP L anterior lung field w crackles, R anterior lung field clear, b/l lateral lung fields w crackles. Posterior lung fields not auscultated due to placement of R groin central line  ABD R-sided abdominal tenderness w guarding, no rebound tenderness  EXT lower-extremity pitting edema 1+, feet not very warm but DP pulses palpable b/l      LABS:  CAPILLARY BLOOD GLUCOSE        I&O's Summary    31 May 2018 07:01  -  2018 07:00  --------------------------------------------------------  IN: 738.2 mL / OUT: 3400 mL / NET: -2661.8 mL                              12.7   11.45 )-----------( 96       ( 2018 02:50 )             41.1     WBC Trend: 11.45<--, 6.95<--, 5.88<--  06-01    127<L>  |  89<L>  |  34<H>  ----------------------------<  155<H>  4.9   |  23  |  4.87<H>    Ca    9.0      2018 02:50  Phos  4.0     06-01  Mg     2.2     06-    TPro  7.9  /  Alb  3.2<L>  /  TBili  0.4  /  DBili  0.2  /  AST  28  /  ALT  24  /  AlkPhos  206<H>  05-31    Creatinine Trend: 4.87<--, 3.73<--, 5.53<--, 4.51<--, 5.62<--, 4.88<--  PT/INR - ( 2018 02:50 )   PT: 26.1 SEC;   INR: 2.31          PTT - ( 2018 02:50 )  PTT:53.2 SEC          Imaging:        MEDICATIONS  (STANDING):  amiodarone    Tablet 100 milliGRAM(s) Oral daily  atorvastatin 80 milliGRAM(s) Oral at bedtime  bisacodyl 5 milliGRAM(s) Oral at bedtime  chlorhexidine 4% Liquid 1 Application(s) Topical <User Schedule>  DOBUTamine Infusion 5 MICROgram(s)/kG/Min (10.65 mL/Hr) IV Continuous <Continuous>  docusate sodium 100 milliGRAM(s) Oral two times a day  finasteride 5 milliGRAM(s) Oral daily  levothyroxine 75 MICROGram(s) Oral daily  senna 2 Tablet(s) Oral at bedtime  sevelamer hydrochloride 1600 milliGRAM(s) Oral <User Schedule>  sodium chloride 0.65% Nasal 1 Spray(s) Both Nostrils four times a day    MEDICATIONS  (PRN):  ALBUTerol/ipratropium for Nebulization 3 milliLiter(s) Nebulizer every 6 hours PRN Shortness of Breath and/or Wheezing      Consult Recommendations: Note Complete    Briefly: 63M severe HFrEF (chronic  gtt), Afib (warfarin), Pulm Fibrosis, ESRD (HD 3-4/wk), DM (off insulin), a/w worsened SOB, titrating     Interval Events:  - Underwent ultrafiltration y'day w net removal of 3 L   - Around 19:00,  was increased from 2.5 to 5. Around 23:00, by Cj method CO 3.34, CI 1.77.    - When BiLevel was removed overnight pt desaturated despite nasal cannula.     Subjective:   GEN no fevers, no chills  CHEST R chest-wall Perma-cath site not burning this morning  RESP breathing feels OK on NC 5L/min this AM  CV no chest pain, no palpitations  GI achy abdominal pain, nausea. No BM y'day.   EXT asks to have [place]-Chris catheter removed from thigh    Physical:  ICU Vital Signs Last 24 Hrs  T(C): 37.4 (2018 04:00), Max: 37.6 (31 May 2018 20:00)  T(F): 99.3 (2018 04:00), Max: 99.7 (31 May 2018 20:00)  HR: 93 (2018 07:08) (76 - 108)  BP: 100/63 (2018 07:00) (87/52 - 126/63)  BP(mean): 69 (2018 07:00) (59 - 89)  ABP: --  ABP(mean): --  RR: 12 (2018 07:00) (11 - 23)  SpO2: 96% (2018 07:08) (81% - 98%)      Telemetry: Atrial fibrillation w rate control, multiple PVC triplets, one run of NSVT ~10 beats    EKG :   - A-fib, HR 95, LAD, QRS narrow, QTc 479 per computerized calculation  - Borderline-low voltage in extremity leads  - q-waves in aVL (<1mm). [pRWP  - No ST elevations or depressions  - T-wave inversions in aVL  - Impression: A-fib w LAD, rate-controlled, no acute ischemia  - Compared to prior, T-wave inversions in aVL more prominent (in recent EKGs aVL T-waves appear flat to mildly inverted)    Exam:  GEN no acute distress, non-toxic-appearing, fatigued  NECK jvd ~6 cm above top of sternum  RESP L anterior lung field w crackles, R anterior lung field clear, b/l lateral lung fields w crackles. Posterior lung fields not auscultated due to placement of R groin central line  ABD R-sided abdominal tenderness w guarding, no rebound tenderness  EXT lower-extremity pitting edema 1+, feet not very warm but DP pulses palpable b/l      LABS:  CAPILLARY BLOOD GLUCOSE        I&O's Summary    31 May 2018 07:01  -  2018 07:00  --------------------------------------------------------  IN: 738.2 mL / OUT: 3400 mL / NET: -2661.8 mL                              12.7   11.45 )-----------( 96       ( 2018 02:50 )             41.1     WBC Trend: 11.45<--, 6.95<--, 5.88<--  06-01    127<L>  |  89<L>  |  34<H>  ----------------------------<  155<H>  4.9   |  23  |  4.87<H>    Ca    9.0      2018 02:50  Phos  4.0     06-  Mg     2.2     06-    TPro  7.9  /  Alb  3.2<L>  /  TBili  0.4  /  DBili  0.2  /  AST  28  /  ALT  24  /  AlkPhos  206<H>  05-31    Creatinine Trend: 4.87<--, 3.73<--, 5.53<--, 4.51<--, 5.62<--, 4.88<--  PT/INR - ( 2018 02:50 )   PT: 26.1 SEC;   INR: 2.31          PTT - ( 2018 02:50 )  PTT:53.2 SEC          Imaging:        MEDICATIONS  (STANDING):  amiodarone    Tablet 100 milliGRAM(s) Oral daily  atorvastatin 80 milliGRAM(s) Oral at bedtime  bisacodyl 5 milliGRAM(s) Oral at bedtime  chlorhexidine 4% Liquid 1 Application(s) Topical <User Schedule>  DOBUTamine Infusion 5 MICROgram(s)/kG/Min (10.65 mL/Hr) IV Continuous <Continuous>  docusate sodium 100 milliGRAM(s) Oral two times a day  finasteride 5 milliGRAM(s) Oral daily  levothyroxine 75 MICROGram(s) Oral daily  senna 2 Tablet(s) Oral at bedtime  sevelamer hydrochloride 1600 milliGRAM(s) Oral <User Schedule>  sodium chloride 0.65% Nasal 1 Spray(s) Both Nostrils four times a day    MEDICATIONS  (PRN):  ALBUTerol/ipratropium for Nebulization 3 milliLiter(s) Nebulizer every 6 hours PRN Shortness of Breath and/or Wheezing      Consult Recommendations:

## 2018-06-01 NOTE — PROGRESS NOTE ADULT - ASSESSMENT
63yM w severe HFrEF (on dobutamine gtt x3 yrs) w Biotronik AICD, A-fib (on warfarin), ESRD on HD MWF (+Sat PRN), pulmonary fibrosis (on home O2), on  p/w epistaxis x1 day, also w acute-on-chronic SOB, found to be volume-overloaded despite reported med compliance, no dietary changes, and having gotten extra session of HD the day prior to admission. SBP 80s-90s in UEs, so to be monitored while undergoing HD was transferred to CCU.     In CCU we are attempting to optimize his cardiopulmonary status   - weaning dobutamine gtt  - aggressive fluid removal w hemodialysis and ultrafiltration  - decreased home midodrine  - adjustments guided by physical exam, hemodynamic measurements using Braidwood-Chris catheter (--> ) and calculations of cardiac output using Cj method (thermodilution unreliable due to valvular regurgitation).    Course c/b abdominal pain and nausea.     # Neuro - no active issues    # ENT - epistaxis, resolved; attachment for home humidification of nasal cannula sent to patient's home  --- continue humidification of nasal cannula, nasal saline spray  --- facilitate patient's getting simple mask to use for supplemental O2 at home    # Cardiac  - Severe bi-ventricular systolic heart failure w AICD in place, on chronic dobutamine gtt 7.5, LVEF 15% this admission.   --- Decr dobutamine to 2.5, f/u evening measurements, potentially discontinue  gtt altogether  --- Once dobutamine adjustments completed, consider discontinuing Braidwood-Chris catheter  --- Give midodrine 10mg one hour before HD, not otherwise   --- appreciate Heart-Failure recs  --- daily weights: standing weights, once Braidwood-Chris catheter removed  - Atrial fibrillation, on home warfarin 3mg QD  --- cont amiodarone 100mg QD  --- check INR QD, dose warfarin QD  - Hyperlipidemia --- cont atorvastatin 80 daily  - BP discrepancy between UEs vs LEs  --- BP in lower extremities is significantly higher than in upper extremities, consistent w prior CCU admission; we think that LE BP is likely more accurate than UE BP. F/u VA duplex of UEs did not reveal any significant arterial stenosis.   - MACE risk reduction --- cont ASA  - Concern for L to R shunt on old TTE  --- discuss/review    # Pulm  - Hypoxia, likely multifactorial --- c/w nasal cannula to keep SpO2 >90%  - Pulmonary fibrosis  --- per pulm, do not anticipate benefit from steroids based on unsuccessful trial in past  - ALONZO --- BiLevel at night and PRN    # GI  - Abdominal pain, R-sided, achy, associated w tenderness to palpation  --- onset coincides w weaning of dobutamine, but thought not likely due to hypoperfusion given that CO and CI still adequate  --- cont newly started PPI QD  --- order simethicone if needed for gas  --- order acetaminophen if needed for pain  - Nausea and vomiting  --- order ondansetron and/or metoclopramide if needed   --- f/u Xray of abdomen  - Constipation --- cont senna, bisacodyl, docusate    #  - BPH   --- cont finasteride  --- clarify whether pt needs finasteride given that he is anuric    # Renal - ESRD on HD  --- C/w HD qMWF, as well as Saturday prn and add'l fluid-removal PRN  --- C/w Carrie-Tiffanie, Sevelamer  --- appreciate nephrology recs    # Endocr  - Diabetes mellitus type 2  --- off insulin for years  --- monitor glucose on BMP and blood gases  - Hypothyroidism  --- cont levothyroxine    # Infectious Disease - HBV vaccination not up to date --- consider HBV vaccination this admission    # VTE ppx: on warfarin  # Dispo: CCU  # GOC: full code, discussed multiple times this admission and in past, including with palliative care 63yM w severe HFrEF (on dobutamine gtt x3 yrs) w Biotronik AICD, A-fib (on warfarin), ESRD on HD MWF (+Sat PRN), pulmonary fibrosis (on home O2), on  p/w epistaxis x1 day, also w acute-on-chronic SOB, found to be volume-overloaded despite reported med compliance, no dietary changes, and having gotten extra session of HD the day prior to admission. SBP 80s-90s in UEs, so to be monitored while undergoing HD was transferred to CCU.     In CCU we are attempting to optimize his cardiopulmonary status   - weaning dobutamine gtt  - aggressive fluid removal w hemodialysis and ultrafiltration  - decreased home midodrine  - adjustments guided by physical exam, hemodynamic measurements using Chester-Chris catheter (--> ) and calculations of cardiac output using Cj method (thermodilution unreliable due to valvular regurgitation).    Course c/b abdominal pain and nausea.     # Neuro - no active issues    # ENT - epistaxis, resolved; attachment for home humidification of nasal cannula sent to patient's home  --- continue humidification of nasal cannula, nasal saline spray  --- facilitate patient's getting simple mask to use for supplemental O2 at home    # Cardiac  - Severe bi-ventricular systolic heart failure w AICD in place, on chronic dobutamine gtt 7.5, LVEF 15% this admission.   --- Decr dobutamine to 2.5, f/u evening measurements, potentially discontinue  gtt altogether  --- Once dobutamine adjustments completed, consider discontinuing Chester-Chris catheter  --- Give midodrine 10mg one hour before HD, not otherwise   --- appreciate Heart-Failure recs  --- daily weights: standing weights, once Chester-Chris catheter removed  - Atrial fibrillation, on home warfarin 3mg QD  --- cont amiodarone 100mg QD  --- check INR QD, dose warfarin QD  - Hyperlipidemia --- cont atorvastatin 80 daily  - BP discrepancy between UEs vs LEs  --- BP in lower extremities is significantly higher than in upper extremities, consistent w prior CCU admission; we think that LE BP is likely more accurate than UE BP. F/u VA duplex of UEs did not reveal any significant arterial stenosis.   - MACE risk reduction --- cont ASA  - Concern for L to R shunt on old TTE  --- discuss/review    # Pulm  - Hypoxia, likely multifactorial --- c/w nasal cannula to keep SpO2 >90%  - Pulmonary fibrosis  --- per pulm, do not anticipate benefit from steroids based on unsuccessful trial in past  - ALONZO --- BiLevel at night and PRN    # GI  - Abdominal pain, R-sided, achy, associated w tenderness to palpation  --- onset coincides w weaning of dobutamine, but thought not likely due to hypoperfusion given that CO and CI still adequate  --- cont newly started PPI QD  --- order simethicone if needed for gas  --- order acetaminophen if needed for pain  --- avoiding Maalox and other aluminum-hydroxide-containing products due to ESRD   - Nausea and vomiting  --- order ondansetron and/or metoclopramide if needed   --- f/u Xray of abdomen  - Constipation --- cont senna, bisacodyl, docusate    #  - BPH   --- cont finasteride  --- clarify whether pt needs finasteride given that he is anuric    # Renal - ESRD on HD  --- C/w HD qMWF, as well as Saturday prn and add'l fluid-removal PRN  --- C/w Carrie-Tiffanie, Sevelamer  --- appreciate nephrology recs    # Endocr  - Diabetes mellitus type 2  --- off insulin for years  --- monitor glucose on BMP and blood gases  - Hypothyroidism  --- cont levothyroxine    # Infectious Disease - HBV vaccination not up to date --- consider HBV vaccination this admission    # VTE ppx: on warfarin  # Dispo: CCU  # GOC: full code, discussed multiple times this admission and in past, including with palliative care

## 2018-06-01 NOTE — PROGRESS NOTE ADULT - PROBLEM SELECTOR PLAN 1
-Hemodynamics on 6/1/18 on dobutamine 5 mcg/kg/min CVP 5, PA 55/17/29.   5/31/18 on dobutamine 5 at 10 pm- PA sat 56.7%, CO 4.4, CI 2.31.   -Hope to remove arely ng today, WBC trending up. Will await Dr. Blankenship.   -He had 3.4L fluid removed via HD, net neg 2.6 yesterday. Had 2 L fluid removed today.  -Would continue Dobutamine at 5 mcg/kg/min. No BB while on .  -Unable to tolerate ACEI/ARB/ARNI due to low BP. Continue midodrine 10 mg po TID as needed for HD.  -He has not been an advanced HF therapy (LVAD/transplant) candidate in past due to severe interstitial lung disease.   -No objection from HF standpoint to start steroids for treatment of pulmonary fibrosis. We can remove more fluid in HD if fluid retention occurs secondary to steroids.

## 2018-06-01 NOTE — PROGRESS NOTE ADULT - SUBJECTIVE AND OBJECTIVE BOX
Patient seen and examined. He is still having abdominal pain 7/10, yesterday was 6/10. Despite going back up on dobutamine to 5 mcg/kg/min his abdominal pain has not improved. He continues to want the groin catheter out. Hope to do so today. SOB has improved since aggressive HD. No CP, palpitations, dizziness. Sister by bedside.       Medications:  ALBUTerol/ipratropium for Nebulization 3 milliLiter(s) Nebulizer every 6 hours PRN  amiodarone    Tablet 100 milliGRAM(s) Oral daily  atorvastatin 80 milliGRAM(s) Oral at bedtime  bisacodyl 5 milliGRAM(s) Oral at bedtime  chlorhexidine 4% Liquid 1 Application(s) Topical <User Schedule>  DOBUTamine Infusion 5 MICROgram(s)/kG/Min IV Continuous <Continuous>  docusate sodium 100 milliGRAM(s) Oral two times a day  finasteride 5 milliGRAM(s) Oral daily  levothyroxine 75 MICROGram(s) Oral daily  senna 2 Tablet(s) Oral at bedtime  sevelamer hydrochloride 1600 milliGRAM(s) Oral <User Schedule>  sodium chloride 0.65% Nasal 1 Spray(s) Both Nostrils four times a day  warfarin 3 milliGRAM(s) Oral once      Vitals:  Vital Signs Last 24 Hrs  T(C): 37 (2018 07:42), Max: 37.6 (31 May 2018 20:00)  T(F): 98.6 (2018 07:42), Max: 99.7 (31 May 2018 20:00)  HR: 92 (2018 10:42) (76 - 108)  BP: 110/53 (2018 09:00) (87/52 - 126/63)  BP(mean): 65 (2018 09:00) (59 - 89)  RR: 10 (2018 09:00) (10 - 23)  SpO2: 94% (2018 10:42) (81% - 98%)    Daily     Daily Weight in k.2 (2018 06:00)    I&O's Detail    31 May 2018 07:01  -  2018 07:00  --------------------------------------------------------  IN:    DOBUTamine Infusion: 47.9 mL    DOBUTamine Infusion: 31.8 mL    DOBUTamine Infusion: 138.5 mL    Oral Fluid: 120 mL    Other: 400 mL  Total IN: 738.2 mL    OUT:    Other: 3400 mL  Total OUT: 3400 mL    Total NET: -2661.8 mL          Physical Exam:     General: No distress. Comfortable.  HEENT: EOM intact.  Neck: Neck supple. JVP not elevated. No masses  Chest: Clear to auscultation bilaterally  CV: Normal S1 and S2. No murmurs, rub, or gallops. Radial pulses normal. No LE edema b/l.   Abdomen: Soft, non-distended, non-tender  Skin: No rashes or skin breakdown  Neurology: Alert and oriented times three. Sensation intact  Psych: Affect normal    Labs:                        12.7   11.45 )-----------( 96       ( 2018 02:50 )             41.1     06-    127<L>  |  89<L>  |  34<H>  ----------------------------<  155<H>  4.9   |  23  |  4.87<H>    Ca    9.0      2018 02:50  Phos  4.0     06  Mg     2.2         TPro  7.9  /  Alb  3.2<L>  /  TBili  0.4  /  DBili  0.2  /  AST  28  /  ALT  24  /  AlkPhos  206<H>  05-31    PT/INR - ( 2018 02:50 )   PT: 26.1 SEC;   INR: 2.31          PTT - ( 2018 02:50 )  PTT:53.2 SEC

## 2018-06-01 NOTE — PROGRESS NOTE ADULT - ATTENDING COMMENTS
His only complaint now is abdominal discomfort. Able to pass flatus. Seems to be able to eat.  No evidence of low flow and he is very warm and well perfused. PA 70% this morning.    Please reduce the  to 2.5 mcg/kg/min now and repeat PA sat in 2h. If maintains >60%, stop the  and pull the [place].  PCW is 10 and CVP is 10 by my assessment. This is likely his dry weight. Would check standing weight, if possible after [place] pulled.  OK to use midodrine pre-HD, but would NOT give continuously as this will increase the SVR and exacerbate his HFrEF.

## 2018-06-01 NOTE — PROGRESS NOTE ADULT - SUBJECTIVE AND OBJECTIVE BOX
[facility] - Mark / Khai PADRON /Jennifer/ VITO García/ VITO Leigh/ Kolton Farr / LISSA Njeru  ---------------------------------------------------------------------------------------------------------------    Patient seen and examined bedside, in CCU    Subjective and Objective: No overnight events. sob better since admission  completed HD earlier today, uf goal was dec to 2L as per CCU recommendation as BP was low and volume status improving. net uf 1.5 L removed  s/p extra PUF yesterday, removed net 3L, uneventful    Allergies: No Known Allergies      Hospital Medications:   MEDICATIONS  (STANDING):  amiodarone    Tablet 100 milliGRAM(s) Oral daily  atorvastatin 80 milliGRAM(s) Oral at bedtime  bisacodyl 5 milliGRAM(s) Oral at bedtime  chlorhexidine 4% Liquid 1 Application(s) Topical <User Schedule>  DOBUTamine Infusion 5 MICROgram(s)/kG/Min (10.65 mL/Hr) IV Continuous <Continuous>  docusate sodium 100 milliGRAM(s) Oral two times a day  finasteride 5 milliGRAM(s) Oral daily  levothyroxine 75 MICROGram(s) Oral daily  senna 2 Tablet(s) Oral at bedtime  sevelamer hydrochloride 1600 milliGRAM(s) Oral <User Schedule>  sodium chloride 0.65% Nasal 1 Spray(s) Both Nostrils four times a day  warfarin 3 milliGRAM(s) Oral once    VITALS:  T(F): 99.3 (06-01-18 @ 16:03), Max: 99.7 (05-31-18 @ 20:00)  HR: 100 (06-01-18 @ 16:03)  BP: 89/57 (06-01-18 @ 16:03)  RR: 21 (06-01-18 @ 16:03)  SpO2: 91% (06-01-18 @ 16:03)  Wt(kg): --    05-31 @ 07:01  -  06-01 @ 07:00  --------------------------------------------------------  IN: 738.2 mL / OUT: 3400 mL / NET: -2661.8 mL    06-01 @ 07:01  -  06-01 @ 16:09  --------------------------------------------------------  IN: 1040.9 mL / OUT: 2600 mL / NET: -1559.1 mL    PHYSICAL EXAM:  Constitutional: NAD  HEENT: anicteric sclera, oropharynx clear  Neck: No JVD  Respiratory: bibasilar crepts, no wheezes  Cardiovascular: S1, S2, RRR  Gastrointestinal: BS+, soft, NT/ND  Extremities: No cyanosis or clubbing. No peripheral edema  Neurological: A/O x 3, no focal deficits  Psychiatric: Normal mood, normal affect  : No CVA tenderness. No rosa.   Skin: No rashes  Vascular Access: rt IJ PC    LABS:  06-01    127<L>  |  89<L>  |  34<H>  ----------------------------<  155<H>  4.9   |  23  |  4.87<H>    Ca    9.0      01 Jun 2018 02:50  Phos  4.0     06-01  Mg     2.2     06-01    TPro  7.9  /  Alb  3.2<L>  /  TBili  0.4  /  DBili  0.2  /  AST  28  /  ALT  24  /  AlkPhos  206<H>  05-31    Creatinine Trend: 4.87 <--, 3.73 <--, 5.53 <--, 4.51 <--, 5.62 <--, 4.88 <--                        12.7   11.45 )-----------( 96       ( 01 Jun 2018 02:50 )             41.1     Urine Studies:        RADIOLOGY & ADDITIONAL STUDIES:

## 2018-06-01 NOTE — PROGRESS NOTE ADULT - ATTENDING COMMENTS
the family members frequently insists on giving him steroids for pulmonary fibrosis: He was tried on steroids on last admission for a few days with no benefit and hence was dced: it will do more harm then good because of potential to retain salt and water with steroids and his poor EF:  5/30: to restart on bipap ( check  his home settings), for the time being, can start on bipap at 12/5 with 40% fio2:  6/1: DW wife , sister and icu staff

## 2018-06-01 NOTE — PROGRESS NOTE ADULT - PROBLEM SELECTOR PLAN 1
s/p PUF yesterday, removed 3kg, tolerated well, uneventful  hypervolemia improving  will evaluate for PUF tomorrow  low Na 2/2 hypervolemia. chronic non adherance w/fluid restriction   renal diet , fluid restriction 1L/day

## 2018-06-01 NOTE — PROGRESS NOTE ADULT - SUBJECTIVE AND OBJECTIVE BOX
Patient is a 63y old  Male who presents with a chief complaint of SOB (29 May 2018 15:55)      Any change in ROS: Doing OK : little sleepy today :  wife as well as the sister at bedside:  he was on bipap last night :       MEDICATIONS  (STANDING):  amiodarone    Tablet 100 milliGRAM(s) Oral daily  atorvastatin 80 milliGRAM(s) Oral at bedtime  bisacodyl 5 milliGRAM(s) Oral at bedtime  chlorhexidine 4% Liquid 1 Application(s) Topical <User Schedule>  DOBUTamine Infusion 5 MICROgram(s)/kG/Min (10.65 mL/Hr) IV Continuous <Continuous>  docusate sodium 100 milliGRAM(s) Oral two times a day  finasteride 5 milliGRAM(s) Oral daily  levothyroxine 75 MICROGram(s) Oral daily  senna 2 Tablet(s) Oral at bedtime  sevelamer hydrochloride 1600 milliGRAM(s) Oral <User Schedule>  sodium chloride 0.65% Nasal 1 Spray(s) Both Nostrils four times a day  warfarin 3 milliGRAM(s) Oral once    MEDICATIONS  (PRN):  ALBUTerol/ipratropium for Nebulization 3 milliLiter(s) Nebulizer every 6 hours PRN Shortness of Breath and/or Wheezing    Vital Signs Last 24 Hrs  T(C): 36.6 (01 Jun 2018 11:15), Max: 37.6 (31 May 2018 20:00)  T(F): 97.9 (01 Jun 2018 11:15), Max: 99.7 (31 May 2018 20:00)  HR: 92 (01 Jun 2018 11:15) (82 - 108)  BP: 89/59 (01 Jun 2018 11:15) (81/37 - 126/63)  BP(mean): 66 (01 Jun 2018 11:15) (46 - 89)  RR: 22 (01 Jun 2018 11:15) (10 - 23)  SpO2: 97% (01 Jun 2018 11:15) (81% - 98%)    I&O's Summary    31 May 2018 07:01  -  01 Jun 2018 07:00  --------------------------------------------------------  IN: 738.2 mL / OUT: 3400 mL / NET: -2661.8 mL    01 Jun 2018 07:01  -  01 Jun 2018 12:37  --------------------------------------------------------  IN: 864.2 mL / OUT: 2600 mL / NET: -1735.8 mL          Physical Exam:   GENERAL: NAD, well-groomed, well-developed  HEENT: BELL/   Atraumatic, Normocephalic  ENMT: No tonsillar erythema, exudates, or enlargement; Moist mucous membranes, Good dentition, No lesions  NECK: Supple, No JVD, Normal thyroid  CHEST/LUNG: crackles bilaterally:   CVS: Regular rate and rhythm; No murmurs, rubs, or gallops  GI: : Soft, Nontender, Nondistended; Bowel sounds present  NERVOUS SYSTEM:  Alert & Oriented X3  EXTREMITIES:  + edema  LYMPH: No lymphadenopathy noted  SKIN: No rashes or lesions  ENDOCRINOLOGY: No Thyromegaly  PSYCH: Appropriate    Labs:  ABG - ( 31 May 2018 22:50 )  pH, Arterial: 7.34  pH, Blood: x     /  pCO2: 46    /  pO2: 82    / HCO3: 23    / Base Excess: -0.7  /  SaO2: 94.7            26, 24, 23, 24, 24, 27, 23, 24                            12.7   11.45 )-----------( 96       ( 01 Jun 2018 02:50 )             41.1                         11.8   6.95  )-----------( 96       ( 31 May 2018 05:07 )             38.2                         12.0   5.88  )-----------( 92       ( 30 May 2018 05:45 )             39.7                         11.3   6.79  )-----------( 97       ( 29 May 2018 04:00 )             37.5     06-01    127<L>  |  89<L>  |  34<H>  ----------------------------<  155<H>  4.9   |  23  |  4.87<H>  05-31    129<L>  |  88<L>  |  26<H>  ----------------------------<  188<H>  3.6   |  26  |  3.73<H>  05-30    129<L>  |  91<L>  |  48<H>  ----------------------------<  111<H>  4.4   |  23  |  5.53<H>  05-29    133<L>  |  93<L>  |  34<H>  ----------------------------<  130<H>  4.3   |  27  |  4.51<H>    Ca    9.0      01 Jun 2018 02:50  Ca    8.5      31 May 2018 05:07  Phos  4.0     06-01  Phos  3.1     05-31  Mg     2.2     06-01  Mg     1.9     05-31    TPro  7.9  /  Alb  3.2<L>  /  TBili  0.4  /  DBili  0.2  /  AST  28  /  ALT  24  /  AlkPhos  206<H>  05-31    CAPILLARY BLOOD GLUCOSE          LIVER FUNCTIONS - ( 31 May 2018 05:07 )  Alb: 3.2 g/dL / Pro: 7.9 g/dL / ALK PHOS: 206 u/L / ALT: 24 u/L / AST: 28 u/L / GGT: x           PT/INR - ( 01 Jun 2018 02:50 )   PT: 26.1 SEC;   INR: 2.31          PTT - ( 01 Jun 2018 02:50 )  PTT:53.2 SEC          RECENT CULTURES:        RESPIRATORY CULTURES:          Studies  Chest X-RAY  CT SCAN Chest   Venous Dopplers: LE:   CT Abdomen  Others    < from: Xray Chest 1 View- PORTABLE-Urgent (05.31.18 @ 07:16) >    INTERPRETATION:  CLINICAL INDICATION: Assess Aurora-Chris catheter placement.    EXAM: Frontal view of the chest with comparison made to chest radiograph   on 5/30/2018 andCT chest 3/22/2018.    FINDINGS:   An AICD overlies left chest wall.  A right internal jugular approach hemodialysis catheter terminates in the   right atrium.  Interval placement of a right femoral approach Aurora-Chris catheter which   terminates in the right pulmonary artery.  The heart size is enlarged.  No pneumothorax. Small right pleural effusion. Diffuse bilateral   reticular opacities compatible with the patient's known history of   interstitial lung disease.  The bones are unremarkable.    IMPRESSION:   Interval placement of a Aurora-Chris catheter which terminates in the right   pulmonary artery.              JUAN MARTIN M.D., RADIOLOGY RESIDENT  This document has been electronically signed.  BAILEY BORJA M.D., ATTENDING RADIOLOGIST  This document has been electronically signed. May 31 2018  4:43PM        < end of copied text >        < from: CT Chest No Cont (03.22.18 @ 16:15) >  COMPARISON: June 5, 2017.    PROCEDURE:   CT of the Chest was performed without intravenous contrast.  Sagittal and coronal reformats were performed.    FINDINGS:    LUNGS AND LARGE AIRWAYS: Unchanged traction bronchiectasis and bilateral   groundglass opacities.  PLEURA: Small right pleural effusion.  VESSELS: Right-sided central venous catheter with tip terminating in the   right atrium.   HEART: Cardiomegaly. Left chest wall ICD. No pericardial effusion.  MEDIASTINUM AND ASH: No lymphadenopathy.  CHEST WALL AND LOWER NECK: Within normal limits.  VISUALIZED UPPER ABDOMEN: Partially imaged dense material inside the   gallbladder, possibly gallstones.  BONES: Degenerative changes of the spine.    IMPRESSION:   Unchanged interstitial lung disease. No new consolidation.                  KELVIN CARMONA M.D., ATTENDING RADIOLOGIST  This document has been electronically signed. Mar 22 2018  4:42PM        < end of copied text >

## 2018-06-01 NOTE — PROGRESS NOTE ADULT - ASSESSMENT
64 y/o male w/ PMHX of MI in past (Per patient, [facility]) no stents, HFrEF (LVEF 15%, LVIDd 6.0 cm, w/ AICD) severe TR, on chronic home dobutamine 7.5 mcg/kg/min,  ESRD on HD (3-4 x a week), HLD, DM II, a.fib on coumadin, interstitial pulmonary lung disease on chronic home O2, hypotension on midodrine comes in with epistaxis. Epistaxis is now resolved. He was found to be hypervolemic, and was given UF the same night, and HD the next day. He will have HD today as well. He has been admitted 6 times this year for various reasons, but mostly for SOB.  RHC 17 on 2.5 mcg/kg/min of dobutamine 2.5 mcg/kg/min showed RA 25, PCWP 25, PA 61/29/39, PA sat 42.8%, CI 1.65, CO 3.60, PVR 3.84. Currently He is on  7.5 mcg/kg/min. He admits to SOB at rest sometimes, but mostly with minimal exertion (typically walking 10 steps), orthopnea. No CP, palpitations, dizziness or syncope. ICD does not reveal any events. Patient had RHC and showed RA 14-20, PCWP 22, PA 44/18/28, AO sat 92%, PA sat 58.3%, CO 4.66, CI 2.46, -based on these numberse, Dobutamine was decreased to 5 mcg/kg/min on 18. Hemodynamics on - CVP 8, PA sat 63.6%,CO 5.25, CI 2.76. Dobutamine was further decreased to 2.5 mcg/kg/min, but patient experienced abdominal pain, dobutamine was increased back to 5 mcg/kg/min on  evening.

## 2018-06-01 NOTE — PROGRESS NOTE ADULT - PROBLEM SELECTOR PLAN 1
secondary to a combination of ILD as well as chf: ? Pt is using cpap/ bipap at home: The compliance report is given to the resident on floor today: Pt has not been complaint with machine at home: He used it only for 6 days and that too, for less then 2 hours. Called his wife to get more information, no answer: Left message to call my office. Pt was discharged on bipap last time : Can restart bipap at 12/5 with 40% fio2 in the night while sleeping and prn in the daytime:  : doig same: feels a shade better: can start bipap as mentioned above at night time while sleepin/1: his SOB ismoslty related to his poor LV function: He has ILD , and he has harris tried on steroids before with no improvement in his symptoms and hence they were dced: This was confirmed again with the wife: He was again tried on steroids on last admission with no significant improvement in his breathing: Further his symptoms improve with better management of  his chf / fluid overload status: At this time would cont current treatment without steroids: He can cont on bipap at night and his compliance at home is pretty poor: Explained to him again as well as his sister and wife that he need to be complaint with his bipap machine at home: They understand!

## 2018-06-02 LAB
BASE EXCESS BLDA CALC-SCNC: 0.4 MMOL/L — SIGNIFICANT CHANGE UP
BUN SERPL-MCNC: 27 MG/DL — HIGH (ref 7–23)
CALCIUM SERPL-MCNC: 9.2 MG/DL — SIGNIFICANT CHANGE UP (ref 8.4–10.5)
CHLORIDE SERPL-SCNC: 93 MMOL/L — LOW (ref 98–107)
CO2 SERPL-SCNC: 26 MMOL/L — SIGNIFICANT CHANGE UP (ref 22–31)
CREAT SERPL-MCNC: 4.2 MG/DL — HIGH (ref 0.5–1.3)
GLUCOSE BLDA-MCNC: 205 MG/DL — HIGH (ref 70–99)
GLUCOSE SERPL-MCNC: 199 MG/DL — HIGH (ref 70–99)
HCO3 BLDA-SCNC: 24 MMOL/L — SIGNIFICANT CHANGE UP (ref 22–26)
HCT VFR BLD CALC: 40.6 % — SIGNIFICANT CHANGE UP (ref 39–50)
HCT VFR BLDA CALC: 42 % — SIGNIFICANT CHANGE UP (ref 39–51)
HGB BLD-MCNC: 12.8 G/DL — LOW (ref 13–17)
HGB BLDA-MCNC: 13.7 G/DL — SIGNIFICANT CHANGE UP (ref 13–17)
INR BLD: 4.73 — HIGH (ref 0.88–1.17)
LACTATE BLDA-SCNC: 1.2 MMOL/L — SIGNIFICANT CHANGE UP (ref 0.5–2)
LACTATE SERPL-SCNC: 1.3 MMOL/L — SIGNIFICANT CHANGE UP (ref 0.5–2)
MAGNESIUM SERPL-MCNC: 2.2 MG/DL — SIGNIFICANT CHANGE UP (ref 1.6–2.6)
MCHC RBC-ENTMCNC: 31.1 PG — SIGNIFICANT CHANGE UP (ref 27–34)
MCHC RBC-ENTMCNC: 31.5 % — LOW (ref 32–36)
MCV RBC AUTO: 98.8 FL — SIGNIFICANT CHANGE UP (ref 80–100)
NRBC # FLD: 0 — SIGNIFICANT CHANGE UP
PCO2 BLDA: 50 MMHG — HIGH (ref 35–48)
PH BLDA: 7.33 PH — LOW (ref 7.35–7.45)
PHOSPHATE SERPL-MCNC: 2.9 MG/DL — SIGNIFICANT CHANGE UP (ref 2.5–4.5)
PLATELET # BLD AUTO: 117 K/UL — LOW (ref 150–400)
PMV BLD: 12.5 FL — SIGNIFICANT CHANGE UP (ref 7–13)
PO2 BLDA: 70 MMHG — LOW (ref 83–108)
POTASSIUM BLDA-SCNC: 4.2 MMOL/L — SIGNIFICANT CHANGE UP (ref 3.4–4.5)
POTASSIUM SERPL-MCNC: 4.4 MMOL/L — SIGNIFICANT CHANGE UP (ref 3.5–5.3)
POTASSIUM SERPL-SCNC: 4.4 MMOL/L — SIGNIFICANT CHANGE UP (ref 3.5–5.3)
PROTHROM AB SERPL-ACNC: 56.2 SEC — HIGH (ref 9.8–13.1)
RBC # BLD: 4.11 M/UL — LOW (ref 4.2–5.8)
RBC # FLD: 15.5 % — HIGH (ref 10.3–14.5)
SAO2 % BLDA: 92.5 % — LOW (ref 95–99)
SODIUM BLDA-SCNC: 126 MMOL/L — LOW (ref 136–146)
SODIUM SERPL-SCNC: 130 MMOL/L — LOW (ref 135–145)
WBC # BLD: 17.92 K/UL — HIGH (ref 3.8–10.5)
WBC # FLD AUTO: 17.92 K/UL — HIGH (ref 3.8–10.5)

## 2018-06-02 PROCEDURE — 74018 RADEX ABDOMEN 1 VIEW: CPT | Mod: 26

## 2018-06-02 PROCEDURE — 99233 SBSQ HOSP IP/OBS HIGH 50: CPT

## 2018-06-02 PROCEDURE — 71045 X-RAY EXAM CHEST 1 VIEW: CPT | Mod: 26

## 2018-06-02 RX ORDER — LACTULOSE 10 G/15ML
10 SOLUTION ORAL
Qty: 0 | Refills: 0 | Status: COMPLETED | OUTPATIENT
Start: 2018-06-02 | End: 2018-06-04

## 2018-06-02 RX ORDER — VANCOMYCIN HCL 1 G
1000 VIAL (EA) INTRAVENOUS ONCE
Qty: 0 | Refills: 0 | Status: COMPLETED | OUTPATIENT
Start: 2018-06-02 | End: 2018-06-02

## 2018-06-02 RX ORDER — MIDODRINE HYDROCHLORIDE 2.5 MG/1
10 TABLET ORAL ONCE
Qty: 0 | Refills: 0 | Status: COMPLETED | OUTPATIENT
Start: 2018-06-02 | End: 2018-06-02

## 2018-06-02 RX ORDER — PIPERACILLIN AND TAZOBACTAM 4; .5 G/20ML; G/20ML
3.38 INJECTION, POWDER, LYOPHILIZED, FOR SOLUTION INTRAVENOUS EVERY 12 HOURS
Qty: 0 | Refills: 0 | Status: DISCONTINUED | OUTPATIENT
Start: 2018-06-02 | End: 2018-06-09

## 2018-06-02 RX ORDER — NOREPINEPHRINE BITARTRATE/D5W 8 MG/250ML
0.05 PLASTIC BAG, INJECTION (ML) INTRAVENOUS
Qty: 16 | Refills: 0 | Status: DISCONTINUED | OUTPATIENT
Start: 2018-06-02 | End: 2018-06-03

## 2018-06-02 RX ORDER — POLYETHYLENE GLYCOL 3350 17 G/17G
17 POWDER, FOR SOLUTION ORAL ONCE
Qty: 0 | Refills: 0 | Status: COMPLETED | OUTPATIENT
Start: 2018-06-02 | End: 2018-06-02

## 2018-06-02 RX ORDER — SODIUM CHLORIDE 9 MG/ML
250 INJECTION INTRAMUSCULAR; INTRAVENOUS; SUBCUTANEOUS ONCE
Qty: 0 | Refills: 0 | Status: COMPLETED | OUTPATIENT
Start: 2018-06-02 | End: 2018-06-02

## 2018-06-02 RX ORDER — SIMETHICONE 80 MG/1
80 TABLET, CHEWABLE ORAL ONCE
Qty: 0 | Refills: 0 | Status: COMPLETED | OUTPATIENT
Start: 2018-06-02 | End: 2018-06-02

## 2018-06-02 RX ORDER — ACETAMINOPHEN 500 MG
650 TABLET ORAL ONCE
Qty: 0 | Refills: 0 | Status: COMPLETED | OUTPATIENT
Start: 2018-06-02 | End: 2018-06-02

## 2018-06-02 RX ADMIN — LACTULOSE 10 GRAM(S): 10 SOLUTION ORAL at 16:52

## 2018-06-02 RX ADMIN — SIMETHICONE 80 MILLIGRAM(S): 80 TABLET, CHEWABLE ORAL at 18:06

## 2018-06-02 RX ADMIN — AMIODARONE HYDROCHLORIDE 100 MILLIGRAM(S): 400 TABLET ORAL at 05:48

## 2018-06-02 RX ADMIN — Medication 5.33 MICROGRAM(S)/KG/MIN: at 08:38

## 2018-06-02 RX ADMIN — Medication 5 MILLIGRAM(S): at 21:30

## 2018-06-02 RX ADMIN — SEVELAMER CARBONATE 1600 MILLIGRAM(S): 2400 POWDER, FOR SUSPENSION ORAL at 08:38

## 2018-06-02 RX ADMIN — Medication 650 MILLIGRAM(S): at 22:40

## 2018-06-02 RX ADMIN — Medication 10.65 MICROGRAM(S)/KG/MIN: at 13:46

## 2018-06-02 RX ADMIN — POLYETHYLENE GLYCOL 3350 17 GRAM(S): 17 POWDER, FOR SOLUTION ORAL at 08:38

## 2018-06-02 RX ADMIN — Medication 100 MILLIGRAM(S): at 05:48

## 2018-06-02 RX ADMIN — FINASTERIDE 5 MILLIGRAM(S): 5 TABLET, FILM COATED ORAL at 12:37

## 2018-06-02 RX ADMIN — Medication 75 MICROGRAM(S): at 05:48

## 2018-06-02 RX ADMIN — MIDODRINE HYDROCHLORIDE 10 MILLIGRAM(S): 2.5 TABLET ORAL at 14:49

## 2018-06-02 RX ADMIN — Medication 100 MILLIGRAM(S): at 18:06

## 2018-06-02 RX ADMIN — Medication 5 MILLIGRAM(S): at 08:41

## 2018-06-02 RX ADMIN — Medication 1 SPRAY(S): at 11:16

## 2018-06-02 RX ADMIN — Medication 1 SPRAY(S): at 05:48

## 2018-06-02 RX ADMIN — Medication 250 MILLIGRAM(S): at 21:30

## 2018-06-02 RX ADMIN — SODIUM CHLORIDE 125 MILLILITER(S): 9 INJECTION INTRAMUSCULAR; INTRAVENOUS; SUBCUTANEOUS at 20:00

## 2018-06-02 RX ADMIN — PIPERACILLIN AND TAZOBACTAM 25 GRAM(S): 4; .5 INJECTION, POWDER, LYOPHILIZED, FOR SOLUTION INTRAVENOUS at 22:38

## 2018-06-02 RX ADMIN — SENNA PLUS 2 TABLET(S): 8.6 TABLET ORAL at 21:30

## 2018-06-02 RX ADMIN — Medication 1 SPRAY(S): at 00:20

## 2018-06-02 RX ADMIN — ATORVASTATIN CALCIUM 80 MILLIGRAM(S): 80 TABLET, FILM COATED ORAL at 21:30

## 2018-06-02 RX ADMIN — SEVELAMER CARBONATE 1600 MILLIGRAM(S): 2400 POWDER, FOR SUSPENSION ORAL at 12:37

## 2018-06-02 RX ADMIN — CHLORHEXIDINE GLUCONATE 1 APPLICATION(S): 213 SOLUTION TOPICAL at 11:16

## 2018-06-02 RX ADMIN — SEVELAMER CARBONATE 1600 MILLIGRAM(S): 2400 POWDER, FOR SUSPENSION ORAL at 18:06

## 2018-06-02 RX ADMIN — PANTOPRAZOLE SODIUM 40 MILLIGRAM(S): 20 TABLET, DELAYED RELEASE ORAL at 05:49

## 2018-06-02 NOTE — PROGRESS NOTE ADULT - PROBLEM SELECTOR PLAN 2
per cardiology  5/31: on dobutamine as well as HD  6/1: Cont current care:  per CHF team  6/2 On Dobutamine drip. management per CHF team

## 2018-06-02 NOTE — PROGRESS NOTE ADULT - SUBJECTIVE AND OBJECTIVE BOX
[facility] - Mark / Khai PADRON /Jennifer/ VITO García/ VITO Leigh/ Kolton Farr / LISSA Njeru  ---------------------------------------------------------------------------------------------------------------    Patient seen and examined bedside    Subjective and Objective: No overnight events, No new complaints today. +sob, better, but persists  remains on Dobutamine drip    Allergies: No Known Allergies      Hospital Medications:   MEDICATIONS  (STANDING):  amiodarone    Tablet 100 milliGRAM(s) Oral daily  atorvastatin 80 milliGRAM(s) Oral at bedtime  bisacodyl 5 milliGRAM(s) Oral at bedtime  chlorhexidine 4% Liquid 1 Application(s) Topical <User Schedule>  DOBUTamine Infusion 5 MICROgram(s)/kG/Min (10.65 mL/Hr) IV Continuous <Continuous>  docusate sodium 100 milliGRAM(s) Oral two times a day  finasteride 5 milliGRAM(s) Oral daily  lactulose Syrup 10 Gram(s) Oral two times a day  levothyroxine 75 MICROGram(s) Oral daily  midodrine 10 milliGRAM(s) Oral once  pantoprazole    Tablet 40 milliGRAM(s) Oral before breakfast  senna 2 Tablet(s) Oral at bedtime  sevelamer hydrochloride 1600 milliGRAM(s) Oral <User Schedule>  sodium chloride 0.65% Nasal 1 Spray(s) Both Nostrils four times a day      VITALS:  T(F): 97.4 (06-02-18 @ 12:00), Max: 99.3 (06-01-18 @ 16:03)  HR: 84 (06-02-18 @ 13:47)  BP: 81/33 (06-02-18 @ 13:47)  RR: 18 (06-02-18 @ 13:47)  SpO2: 91% (06-02-18 @ 13:47)  Wt(kg): --    06-01 @ 07:01  -  06-02 @ 07:00  --------------------------------------------------------  IN: 1296.8 mL / OUT: 2600 mL / NET: -1303.2 mL    06-02 @ 07:01  -  06-02 @ 14:15  --------------------------------------------------------  IN: 195.9 mL / OUT: 0 mL / NET: 195.9 mL    PHYSICAL EXAM:  Constitutional: NAD  HEENT: anicteric sclera, oropharynx clear  Neck: No JVD  Respiratory: bibasilar rhonchi. no wheezes,  Cardiovascular: S1, S2, RRR  Gastrointestinal: BS+, soft, NT/ND  Extremities: No cyanosis or clubbing. No peripheral edema  Neurological: A/O x 3, no focal deficits  Psychiatric: Normal mood, normal affect  : No CVA tenderness. No roas.   Skin: No rashes  Vascular Access: rt IJ PC    LABS:  06-02    130<L>  |  93<L>  |  27<H>  ----------------------------<  199<H>  4.4   |  26  |  4.20<H>    Ca    9.2      02 Jun 2018 05:45  Phos  2.9     06-02  Mg     2.2     06-02      Creatinine Trend: 4.20 <--, 4.87 <--, 3.73 <--, 5.53 <--, 4.51 <--, 5.62 <--, 4.88 <--                        12.8   17.92 )-----------( 117      ( 02 Jun 2018 05:45 )             40.6     Urine Studies:        RADIOLOGY & ADDITIONAL STUDIES:

## 2018-06-02 NOTE — PROGRESS NOTE ADULT - PROBLEM SELECTOR PLAN 1
s/p HD yesterday, removed net uf 1.8kg, tolerated well, bp low  hypervolemia improving  would benefit from PUF today, 2.5hrs, uf 2kg as tolearted, d/w pt, CCU  low Na 2/2 hypervolemia. chronic non adherance w/fluid restriction   renal diet , fluid restriction 1L/day

## 2018-06-02 NOTE — PROGRESS NOTE ADULT - SUBJECTIVE AND OBJECTIVE BOX
Note Incomplete  --- Pending Attending Rounds      Briefly:  Interval Events:   Subjective:     Physical:  ICU Vital Signs Last 24 Hrs  T(C): 37 (02 Jun 2018 08:00), Max: 37.4 (01 Jun 2018 16:03)  T(F): 98.6 (02 Jun 2018 08:00), Max: 99.3 (01 Jun 2018 16:03)  HR: 93 (02 Jun 2018 09:00) (84 - 108)  BP: 90/59 (02 Jun 2018 09:00) (71/42 - 107/64)  BP(mean): 64 (02 Jun 2018 09:00) (38 - 72)  ABP: --  ABP(mean): --  RR: 13 (02 Jun 2018 09:00) (9 - 23)  SpO2: 98% (02 Jun 2018 09:00) (88% - 98%)      Telemetry:   EKG:    Exam:      LABS:  CAPILLARY BLOOD GLUCOSE        I&O's Summary    01 Jun 2018 07:01  -  02 Jun 2018 07:00  --------------------------------------------------------  IN: 1296.8 mL / OUT: 2600 mL / NET: -1303.2 mL    02 Jun 2018 07:01  -  02 Jun 2018 09:51  --------------------------------------------------------  IN: 70.6 mL / OUT: 0 mL / NET: 70.6 mL                              12.8   17.92 )-----------( 117      ( 02 Jun 2018 05:45 )             40.6     WBC Trend: 17.92<--, 11.45<--, 6.95<--  06-02    130<L>  |  93<L>  |  27<H>  ----------------------------<  199<H>  4.4   |  26  |  4.20<H>    Ca    9.2      02 Jun 2018 05:45  Phos  2.9     06-02  Mg     2.2     06-02      Creatinine Trend: 4.20<--, 4.87<--, 3.73<--, 5.53<--, 4.51<--, 5.62<--  PT/INR - ( 02 Jun 2018 05:45 )   PT: 56.2 SEC;   INR: 4.73          PTT - ( 01 Jun 2018 02:50 )  PTT:53.2 SEC          Imaging:        MEDICATIONS  (STANDING):  amiodarone    Tablet 100 milliGRAM(s) Oral daily  atorvastatin 80 milliGRAM(s) Oral at bedtime  bisacodyl 5 milliGRAM(s) Oral at bedtime  chlorhexidine 4% Liquid 1 Application(s) Topical <User Schedule>  DOBUTamine Infusion 2.5 MICROgram(s)/kG/Min (5.325 mL/Hr) IV Continuous <Continuous>  docusate sodium 100 milliGRAM(s) Oral two times a day  finasteride 5 milliGRAM(s) Oral daily  levothyroxine 75 MICROGram(s) Oral daily  pantoprazole    Tablet 40 milliGRAM(s) Oral before breakfast  senna 2 Tablet(s) Oral at bedtime  sevelamer hydrochloride 1600 milliGRAM(s) Oral <User Schedule>  sodium chloride 0.65% Nasal 1 Spray(s) Both Nostrils four times a day    MEDICATIONS  (PRN):  ALBUTerol/ipratropium for Nebulization 3 milliLiter(s) Nebulizer every 6 hours PRN Shortness of Breath and/or Wheezing      Consult Recommendations: Note Incomplete  --- Pending Attending Rounds      Briefly: 63yM severe HFrEF (chronic  gtt), A-fib (warfarin), Pulm Fibrosis, ESRD (HD), p/w worsening SOB, in CCU for aggro HD,  titration    Interval Events: used BiLevel mask, did not sleep well    Subjective:   GEN no fevers, no chills  RESP breathing better  CV no chest pain, no palpiations  GI ongoing abdominal pain    Physical:  ICU Vital Signs Last 24 Hrs  T(C): 37 (2018 08:00), Max: 37.4 (2018 16:03)  T(F): 98.6 (2018 08:00), Max: 99.3 (2018 16:03)  HR: 93 (2018 09:00) (84 - 108)  BP: 90/59 (2018 09:00) (71/42 - 107/64)  BP(mean): 64 (2018 09:00) (38 - 72)  ABP: --  ABP(mean): --  RR: 13 (2018 09:00) (9 - 23)  SpO2: 98% (2018 09:00) (88% - 98%)      Telemetry:   EKG:    Exam:      LABS:  CAPILLARY BLOOD GLUCOSE        I&O's Summary    2018 07:01  -  2018 07:00  --------------------------------------------------------  IN: 1296.8 mL / OUT: 2600 mL / NET: -1303.2 mL    2018 07:01  -  2018 09:51  --------------------------------------------------------  IN: 70.6 mL / OUT: 0 mL / NET: 70.6 mL                              12.8   17.92 )-----------( 117      ( 2018 05:45 )             40.6     WBC Trend: 17.92<--, 11.45<--, 6.95<--  06-02    130<L>  |  93<L>  |  27<H>  ----------------------------<  199<H>  4.4   |  26  |  4.20<H>    Ca    9.2      2018 05:45  Phos  2.9     06-02  Mg     2.2     06-02      Creatinine Trend: 4.20<--, 4.87<--, 3.73<--, 5.53<--, 4.51<--, 5.62<--  PT/INR - ( 2018 05:45 )   PT: 56.2 SEC;   INR: 4.73          PTT - ( 2018 02:50 )  PTT:53.2 SEC          Imaging:        MEDICATIONS  (STANDING):  amiodarone    Tablet 100 milliGRAM(s) Oral daily  atorvastatin 80 milliGRAM(s) Oral at bedtime  bisacodyl 5 milliGRAM(s) Oral at bedtime  chlorhexidine 4% Liquid 1 Application(s) Topical <User Schedule>  DOBUTamine Infusion 2.5 MICROgram(s)/kG/Min (5.325 mL/Hr) IV Continuous <Continuous>  docusate sodium 100 milliGRAM(s) Oral two times a day  finasteride 5 milliGRAM(s) Oral daily  levothyroxine 75 MICROGram(s) Oral daily  pantoprazole    Tablet 40 milliGRAM(s) Oral before breakfast  senna 2 Tablet(s) Oral at bedtime  sevelamer hydrochloride 1600 milliGRAM(s) Oral <User Schedule>  sodium chloride 0.65% Nasal 1 Spray(s) Both Nostrils four times a day    MEDICATIONS  (PRN):  ALBUTerol/ipratropium for Nebulization 3 milliLiter(s) Nebulizer every 6 hours PRN Shortness of Breath and/or Wheezing      Consult Recommendations: Note Incomplete  --- Pending Attending Rounds      Briefly: 63yM severe HFrEF (chronic  gtt), A-fib (warfarin), Pulm Fibrosis, ESRD (HD), p/w worsening SOB, in CCU for aggro HD,  titration    Interval Events: used BiLevel mask, did not sleep well    Subjective:   GEN no fevers, no chills  RESP breathing better  CV no chest pain, no palpiations  GI ongoing abdominal pain    Physical:  ICU Vital Signs Last 24 Hrs  T(C): 37 (2018 08:00), Max: 37.4 (2018 16:03)  T(F): 98.6 (2018 08:00), Max: 99.3 (2018 16:03)  HR: 93 (2018 09:00) (84 - 108)  BP: 90/59 (2018 09:00) (71/42 - 107/64)  BP(mean): 64 (2018 09:00) (38 - 72)  ABP: --  ABP(mean): --  RR: 13 (2018 09:00) (9 - 23)  SpO2: 98% (2018 09:00) (88% - 98%)      Telemetry: A-fib, frequent NSVT up to 5 consecutive beats    EKG:    Exam:      LABS:  CAPILLARY BLOOD GLUCOSE        I&O's Summary    2018 07:01  -  2018 07:00  --------------------------------------------------------  IN: 1296.8 mL / OUT: 2600 mL / NET: -1303.2 mL    2018 07:01  -  2018 09:51  --------------------------------------------------------  IN: 70.6 mL / OUT: 0 mL / NET: 70.6 mL                              12.8   17.92 )-----------( 117      ( 2018 05:45 )             40.6     WBC Trend: 17.92<--, 11.45<--, 6.95<--  06-02    130<L>  |  93<L>  |  27<H>  ----------------------------<  199<H>  4.4   |  26  |  4.20<H>    Ca    9.2      2018 05:45  Phos  2.9     06-02  Mg     2.2           Creatinine Trend: 4.20<--, 4.87<--, 3.73<--, 5.53<--, 4.51<--, 5.62<--  PT/INR - ( 2018 05:45 )   PT: 56.2 SEC;   INR: 4.73          PTT - ( 2018 02:50 )  PTT:53.2 SEC          Imaging:        MEDICATIONS  (STANDING):  amiodarone    Tablet 100 milliGRAM(s) Oral daily  atorvastatin 80 milliGRAM(s) Oral at bedtime  bisacodyl 5 milliGRAM(s) Oral at bedtime  chlorhexidine 4% Liquid 1 Application(s) Topical <User Schedule>  DOBUTamine Infusion 2.5 MICROgram(s)/kG/Min (5.325 mL/Hr) IV Continuous <Continuous>  docusate sodium 100 milliGRAM(s) Oral two times a day  finasteride 5 milliGRAM(s) Oral daily  levothyroxine 75 MICROGram(s) Oral daily  pantoprazole    Tablet 40 milliGRAM(s) Oral before breakfast  senna 2 Tablet(s) Oral at bedtime  sevelamer hydrochloride 1600 milliGRAM(s) Oral <User Schedule>  sodium chloride 0.65% Nasal 1 Spray(s) Both Nostrils four times a day    MEDICATIONS  (PRN):  ALBUTerol/ipratropium for Nebulization 3 milliLiter(s) Nebulizer every 6 hours PRN Shortness of Breath and/or Wheezing      Consult Recommendations: Note Complete    Briefly: 63yM severe HFrEF (chronic  gtt), A-fib (warfarin), Pulm Fibrosis, ESRD (HD), p/w worsening SOB, in CCU for aggro HD,  titration    Interval Events: used BiLevel mask, did not sleep well    Subjective:   GEN no fevers, no chills  RESP breathing better  CV no chest pain, no palpitations  GI ongoing abdominal pain    Physical:  ICU Vital Signs Last 24 Hrs  T(C): 37 (2018 08:00), Max: 37.4 (2018 16:03)  T(F): 98.6 (2018 08:00), Max: 99.3 (2018 16:03)  HR: 93 (2018 09:00) (84 - 108)  BP: 90/59 (2018 09:00) (71/42 - 107/64)  BP(mean): 64 (2018 09:00) (38 - 72)  ABP: --  ABP(mean): --  RR: 13 (2018 09:00) (9 - 23)  SpO2: 98% (2018 09:00) (88% - 98%)      Telemetry: A-fib, frequent NSVT up to 5 consecutive beats    EKG : A-fib, QTc 487, paired PVCs, extremity leads w low voltage. Grossly similar to prior.     Exam (as of this AM):  GEN fatigued-appearing, A&Ox3  RESP diffuse crackles, similar to prior  CV irreg irreg, s1 s2  ABD soft, R-sided tenderness w guarding, no rebound tenderness  EXT thighs w 1+ pitting edema, DP pulses palpable b/l      LABS:  CAPILLARY BLOOD GLUCOSE        I&O's Summary    2018 07:01  -  2018 07:00  --------------------------------------------------------  IN: 1296.8 mL / OUT: 2600 mL / NET: -1303.2 mL    2018 07:01  -  2018 09:51  --------------------------------------------------------  IN: 70.6 mL / OUT: 0 mL / NET: 70.6 mL                              12.8   17.92 )-----------( 117      ( 2018 05:45 )             40.6     WBC Trend: 17.92<--, 11.45<--, 6.95<--  06-02    130<L>  |  93<L>  |  27<H>  ----------------------------<  199<H>  4.4   |  26  |  4.20<H>    Ca    9.2      2018 05:45  Phos  2.9     06-02  Mg     2.2     0602      Creatinine Trend: 4.20<--, 4.87<--, 3.73<--, 5.53<--, 4.51<--, 5.62<--    PT/INR - ( 2018 05:45 )   PT: 56.2 SEC;   INR: 4.73       PTT - ( 2018 02:50 )  PTT:53.2 SEC          Imaging:        MEDICATIONS  (STANDING):  amiodarone    Tablet 100 milliGRAM(s) Oral daily  atorvastatin 80 milliGRAM(s) Oral at bedtime  bisacodyl 5 milliGRAM(s) Oral at bedtime  chlorhexidine 4% Liquid 1 Application(s) Topical <User Schedule>  DOBUTamine Infusion 2.5 MICROgram(s)/kG/Min (5.325 mL/Hr) IV Continuous <Continuous>  docusate sodium 100 milliGRAM(s) Oral two times a day  finasteride 5 milliGRAM(s) Oral daily  levothyroxine 75 MICROGram(s) Oral daily  pantoprazole    Tablet 40 milliGRAM(s) Oral before breakfast  senna 2 Tablet(s) Oral at bedtime  sevelamer hydrochloride 1600 milliGRAM(s) Oral <User Schedule>  sodium chloride 0.65% Nasal 1 Spray(s) Both Nostrils four times a day    MEDICATIONS  (PRN):  ALBUTerol/ipratropium for Nebulization 3 milliLiter(s) Nebulizer every 6 hours PRN Shortness of Breath and/or Wheezing      Consult Recommendations:

## 2018-06-02 NOTE — PROGRESS NOTE ADULT - PROBLEM SELECTOR PLAN 1
secondary to a combination of ILD as well as chf: ? Pt is using cpap/ bipap at home: The compliance report is given to the resident on floor today: Pt has not been complaint with machine at home: He used it only for 6 days and that too, for less then 2 hours. Called his wife to get more information, no answer: Left message to call my office. Pt was discharged on bipap last time : Can restart bipap at 12/5 with 40% fio2 in the night while sleeping and prn in the daytime:  : doig same: feels a shade better: can start bipap as mentioned above at night time while sleepin/1: his SOB ismoslty related to his poor LV function: He has ILD , and he has harris tried on steroids before with no improvement in his symptoms and hence they were dced: This was confirmed again with the wife: He was again tried on steroids on last admission with no significant improvement in his breathing: Further his symptoms improve with better management of  his chf / fluid overload status: At this time would cont current treatment without steroids: He can cont on bipap at night and his compliance at home is pretty poor: Explained to him again as well as his sister and wife that he need to be complaint with his bipap machine at home: They understand!   SOB (+), not tachypneic.  on nasal cannular used bipap last night. on Dobutamine drip. leukocytosis (+) slightly elevated temp. Repeat CXR

## 2018-06-02 NOTE — PROGRESS NOTE ADULT - SUBJECTIVE AND OBJECTIVE BOX
SUBJECTIVE:  No chest pain or shortness of breath.     MEDICATIONS:  amiodarone    Tablet 100 milliGRAM(s) Oral daily  DOBUTamine Infusion 2.5 MICROgram(s)/kG/Min IV Continuous <Continuous>  midodrine 10 milliGRAM(s) Oral once      ALBUTerol/ipratropium for Nebulization 3 milliLiter(s) Nebulizer every 6 hours PRN      bisacodyl 5 milliGRAM(s) Oral at bedtime  docusate sodium 100 milliGRAM(s) Oral two times a day  pantoprazole    Tablet 40 milliGRAM(s) Oral before breakfast  senna 2 Tablet(s) Oral at bedtime    atorvastatin 80 milliGRAM(s) Oral at bedtime  finasteride 5 milliGRAM(s) Oral daily  levothyroxine 75 MICROGram(s) Oral daily    chlorhexidine 4% Liquid 1 Application(s) Topical <User Schedule>  sodium chloride 0.65% Nasal 1 Spray(s) Both Nostrils four times a day      REVIEW OF SYSTEMS:    CONSTITUTIONAL: No fever, weight loss, or fatigue  EYES: No eye pain, visual disturbances, or discharge  NECK: No pain or stiffness  RESPIRATORY: No cough, wheezing, chills or hemoptysis; No Shortness of Breath  CARDIOVASCULAR: No chest pain, palpitations, dizziness, or leg swelling  GASTROINTESTINAL: No abdominal or epigastric pain. No nausea, vomiting, or hematemesis; No diarrhea or constipation. No melena or hematochezia.  GENITOURINARY: No dysuria, frequency, hematuria, or incontinence  NEUROLOGICAL: No headaches, memory loss, loss of strength, numbness, or tremors  SKIN: No itching, burning, rashes, or lesions   LYMPH Nodes: No enlarged glands  MUSCULOSKELETAL: No joint pain or swelling; No muscle, back, or extremity pain  All other review of systems are negative.  	  [ ] Unable to obtain    PHYSICAL EXAM:  T(C): 36.3 (06-02-18 @ 12:00), Max: 37.4 (06-01-18 @ 16:03)  HR: 97 (06-02-18 @ 11:18) (85 - 108)  BP: 84/55 (06-02-18 @ 11:00) (71/42 - 107/64)  RR: 14 (06-02-18 @ 11:00) (9 - 23)  SpO2: 95% (06-02-18 @ 11:18) (88% - 98%)  Wt(kg): --  I&O's Summary    01 Jun 2018 07:01  -  02 Jun 2018 07:00  --------------------------------------------------------  IN: 1296.8 mL / OUT: 2600 mL / NET: -1303.2 mL    02 Jun 2018 07:01  -  02 Jun 2018 12:28  --------------------------------------------------------  IN: 195.9 mL / OUT: 0 mL / NET: 195.9 mL          PHYSICAL EXAM    Appearance: Normal	  HEENT:   Normal oral mucosa, PERRL, EOMI	  NECK: Soft and supple, No LAD, No JVD  Cardiovascular: Regular Rate and Rhythm, Normal S1 S2, No murmurs, No clicks, gallops or rubs  Respiratory: diminished BS at bases	  Gastrointestinal:  Soft, Non-tender, + BS	  Skin: No rashes, No ecchymoses, No cyanosis  Neurologic: Non-focal  Extremities: No clubbing, cyanosis or edema  Vascular: Peripheral pulses palpable 2+ bilaterally    LABS:	 	                          12.8   17.92 )-----------( 117      ( 02 Jun 2018 05:45 )             40.6     06-02    130<L>  |  93<L>  |  27<H>  ----------------------------<  199<H>  4.4   |  26  |  4.20<H>    Ca    9.2      02 Jun 2018 05:45  Phos  2.9     06-02  Mg     2.2     06-02

## 2018-06-02 NOTE — PROVIDER CONTACT NOTE (OTHER) - SITUATION
Patient is currently on HD- PUF treatment for one hour 5 min, hypotensive on repeated times BP 71/32

## 2018-06-02 NOTE — PROGRESS NOTE ADULT - ASSESSMENT
63yM w severe HFrEF (on dobutamine gtt x3 yrs) w [employer] AICD, A-fib (on warfarin), ESRD on HD MWF (+Sat PRN), pulmonary fibrosis (on home O2), on  p/w epistaxis x1 day, also w acute-on-chronic SOB, found to be volume-overloaded despite reported med compliance, no dietary changes, and having gotten extra session of HD the day prior to admission. SBP 80s-90s in UEs, so in order to be monitored while undergoing fluid removal by HD/UF, was transferred to CCU.     Since admission, dyspnea improved.  gtt has been adjusted from 7.5 as low as 2.5, based on RHC measurements (Otter-Chris in place -). Course c/b abdominal pain and nausea, as well as rising WBC.     # Neuro - no active issues    # ENT - epistaxis, resolved; attachment for home humidification of nasal cannula sent to patient's home  --- continue humidification of nasal cannula, nasal saline spray  --- facilitate patient's getting simple mask to use for supplemental O2 at home    # Cardiac  - Severe bi-ventricular systolic heart failure w AICD in place, on chronic dobutamine gtt 7.5, LVEF 15% this admission.    --- In light of pt's lethargy/malaise, abd pain, and rising WBC, incr  gtt from 2.5 back to 5   --- remove femoral introducer (had been left in place even after Otter-Chris removed)  --- Give midodrine 10mg one hour before HD, not otherwise   --- appreciate Heart-Failure recs  --- daily weights: standing weights if possible  - Atrial fibrillation, on home warfarin 3mg QD  --- cont amiodarone 100mg QD  --- check INR QD, dose warfarin QD  ------- INR 6/2 >4, so no warfarin ordered  - Hyperlipidemia --- cont atorvastatin 80 daily  - BP discrepancy between UEs vs LEs  --- BP in lower extremities is significantly higher than in upper extremities, consistent w prior CCU admission; we think that LE BP is likely more accurate than UE BP. F/u VA duplex of UEs did not reveal any significant arterial stenosis.   - MACE risk reduction --- cont ASA  - Concern for L to R shunt on old TTE  --- discuss/review    # Pulm  - Hypoxia, likely multifactorial --- c/w nasal cannula to keep SpO2 >90%  - Pulmonary fibrosis  --- per pulm, do not anticipate benefit from steroids based on unsuccessful trial in past  - ALONZO --- BiLevel at night and PRN    # GI  - Abdominal pain, R-sided, achy, associated w tenderness to palpation  --- onset coincides w weaning of dobutamine, but thought not likely due to hypoperfusion given that CO and CI still adequate  --- cont newly started PPI QD  --- order simethicone if needed for gas  --- order acetaminophen if needed for pain  --- avoiding Maalox and other aluminum-hydroxide-containing products due to ESRD   - Nausea and vomiting  --- order ondansetron and/or metoclopramide if needed   --- f/u Xray of abdomen  - Constipation --- cont senna, bisacodyl, docusate; add'l agents PRN    #  - BPH   --- cont finasteride  --- clarify whether pt needs finasteride given that he is anuric    # Renal - ESRD on HD  --- C/w HD qMWF, as well as Saturday prn and add'l fluid-removal PRN  --- C/w Carrie-Tiffanie, Sevelamer  --- appreciate nephrology recs    # Endocr  - Diabetes mellitus type 2  --- off insulin for years  --- monitor glucose on BMP and blood gases  - Hypothyroidism  --- cont levothyroxine    # Infectious Disease   - Leukocytosis, oral temperature afebrile   --- check lactate  --- check BCx x2  --- low threshold to start antibiotics  - HBV vaccination not up to date --- consider HBV vaccination this admission    # VTE ppx: on warfarin  # Dispo: CCU  # GOC: full code, discussed multiple times this admission and in past, including with palliative care

## 2018-06-02 NOTE — PROGRESS NOTE ADULT - ASSESSMENT
63 year old man with ESRD (HD MWF), NICM (EF 21%) s/p ICD, PICC line in place with home dobutamine drip, atrial fibrillation on coumadin, COPD on home O2 (4-5L), pulmonary fibrosis with increasing shortness of breath and severe epistaxis.  No more epistaxis.  Continue dobutamine. Would strongly advise against down titration attempts as in the past this was not well tolerated  Echo results unchanged from previous study.  EF is 15%. NYHA Class IV ACC AHA Stage D  On Midodrine  Unable to attempt GDMT (ACE/ARB/ARNI/BB) given hypotension  Severe ILD precludes ability to implant LVAD  HD per renal  BIPAP per Pulmonary  Appreciate CCU care and multiple consultants follow up.  Continue present care

## 2018-06-02 NOTE — PROGRESS NOTE ADULT - ATTENDING COMMENTS
Agree with above assessment and plan  HFrEF on home dobutamine therapy- attempting to wean  Alea removed overnight and dobutamine decreased to 2.5 mg/kg/min- patient with increased nausea and abdominal pain overnight- will put dobutamine up to 5 mcg/kg/min with attempt to wean further if remains stable and pain free  Continue HD per renal  FU cultures

## 2018-06-03 DIAGNOSIS — R50.9 FEVER, UNSPECIFIED: ICD-10-CM

## 2018-06-03 LAB
ALBUMIN SERPL ELPH-MCNC: 2.8 G/DL — LOW (ref 3.3–5)
ALP SERPL-CCNC: 205 U/L — HIGH (ref 40–120)
ALT FLD-CCNC: 16 U/L — SIGNIFICANT CHANGE UP (ref 4–41)
AST SERPL-CCNC: 20 U/L — SIGNIFICANT CHANGE UP (ref 4–40)
BASE EXCESS BLDA CALC-SCNC: -2.1 MMOL/L — SIGNIFICANT CHANGE UP
BASE EXCESS BLDA CALC-SCNC: -2.8 MMOL/L — SIGNIFICANT CHANGE UP
BASE EXCESS BLDV CALC-SCNC: -1.7 MMOL/L — SIGNIFICANT CHANGE UP
BASOPHILS # BLD AUTO: 0.05 K/UL — SIGNIFICANT CHANGE UP (ref 0–0.2)
BASOPHILS NFR BLD AUTO: 0.2 % — SIGNIFICANT CHANGE UP (ref 0–2)
BILIRUB SERPL-MCNC: 0.5 MG/DL — SIGNIFICANT CHANGE UP (ref 0.2–1.2)
BLOOD GAS VENOUS - CREATININE: 6.08 MG/DL — HIGH (ref 0.5–1.3)
BUN SERPL-MCNC: 34 MG/DL — HIGH (ref 7–23)
BUN SERPL-MCNC: 34 MG/DL — HIGH (ref 7–23)
CA-I BLDA-SCNC: 1.19 MMOL/L — SIGNIFICANT CHANGE UP (ref 1.15–1.29)
CALCIUM SERPL-MCNC: 8.8 MG/DL — SIGNIFICANT CHANGE UP (ref 8.4–10.5)
CALCIUM SERPL-MCNC: 8.8 MG/DL — SIGNIFICANT CHANGE UP (ref 8.4–10.5)
CHLORIDE BLDA-SCNC: 98 MMOL/L — SIGNIFICANT CHANGE UP (ref 96–108)
CHLORIDE BLDV-SCNC: 99 MMOL/L — SIGNIFICANT CHANGE UP (ref 96–108)
CHLORIDE SERPL-SCNC: 88 MMOL/L — LOW (ref 98–107)
CHLORIDE SERPL-SCNC: 88 MMOL/L — LOW (ref 98–107)
CO2 SERPL-SCNC: 21 MMOL/L — LOW (ref 22–31)
CO2 SERPL-SCNC: 21 MMOL/L — LOW (ref 22–31)
CREAT SERPL-MCNC: 5.38 MG/DL — HIGH (ref 0.5–1.3)
CREAT SERPL-MCNC: 5.38 MG/DL — HIGH (ref 0.5–1.3)
EOSINOPHIL # BLD AUTO: 0.02 K/UL — SIGNIFICANT CHANGE UP (ref 0–0.5)
EOSINOPHIL NFR BLD AUTO: 0.1 % — SIGNIFICANT CHANGE UP (ref 0–6)
GAS PNL BLDV: 123 MMOL/L — LOW (ref 136–146)
GLUCOSE BLDA-MCNC: 220 MG/DL — HIGH (ref 70–99)
GLUCOSE BLDA-MCNC: 333 MG/DL — HIGH (ref 70–99)
GLUCOSE BLDV-MCNC: 202 — HIGH (ref 70–99)
GLUCOSE SERPL-MCNC: 312 MG/DL — HIGH (ref 70–99)
GLUCOSE SERPL-MCNC: 312 MG/DL — HIGH (ref 70–99)
HCO3 BLDA-SCNC: 22 MMOL/L — SIGNIFICANT CHANGE UP (ref 22–26)
HCO3 BLDA-SCNC: 22 MMOL/L — SIGNIFICANT CHANGE UP (ref 22–26)
HCO3 BLDV-SCNC: 22 MMOL/L — SIGNIFICANT CHANGE UP (ref 20–27)
HCT VFR BLD CALC: 42.8 % — SIGNIFICANT CHANGE UP (ref 39–50)
HCT VFR BLDA CALC: 40.1 % — SIGNIFICANT CHANGE UP (ref 39–51)
HCT VFR BLDA CALC: 40.6 % — SIGNIFICANT CHANGE UP (ref 39–51)
HCT VFR BLDV CALC: 39.9 % — SIGNIFICANT CHANGE UP (ref 39–51)
HGB BLD-MCNC: 12.9 G/DL — LOW (ref 13–17)
HGB BLDA-MCNC: 13 G/DL — SIGNIFICANT CHANGE UP (ref 13–17)
HGB BLDA-MCNC: 13.2 G/DL — SIGNIFICANT CHANGE UP (ref 13–17)
HGB BLDV-MCNC: 13 G/DL — SIGNIFICANT CHANGE UP (ref 13–17)
IMM GRANULOCYTES # BLD AUTO: 0.23 # — SIGNIFICANT CHANGE UP
IMM GRANULOCYTES NFR BLD AUTO: 0.9 % — SIGNIFICANT CHANGE UP (ref 0–1.5)
INR BLD: 6.16 — CRITICAL HIGH (ref 0.88–1.17)
LACTATE BLDA-SCNC: 1 MMOL/L — SIGNIFICANT CHANGE UP (ref 0.5–2)
LACTATE BLDA-SCNC: 1.2 MMOL/L — SIGNIFICANT CHANGE UP (ref 0.5–2)
LACTATE BLDV-MCNC: 1.5 MMOL/L — SIGNIFICANT CHANGE UP (ref 0.5–2)
LYMPHOCYTES # BLD AUTO: 1.32 K/UL — SIGNIFICANT CHANGE UP (ref 1–3.3)
LYMPHOCYTES # BLD AUTO: 5.2 % — LOW (ref 13–44)
MAGNESIUM SERPL-MCNC: 2.2 MG/DL — SIGNIFICANT CHANGE UP (ref 1.6–2.6)
MCHC RBC-ENTMCNC: 30.1 % — LOW (ref 32–36)
MCHC RBC-ENTMCNC: 31 PG — SIGNIFICANT CHANGE UP (ref 27–34)
MCV RBC AUTO: 102.9 FL — HIGH (ref 80–100)
MONOCYTES # BLD AUTO: 2.54 K/UL — HIGH (ref 0–0.9)
MONOCYTES NFR BLD AUTO: 10 % — SIGNIFICANT CHANGE UP (ref 2–14)
NEUTROPHILS # BLD AUTO: 21.27 K/UL — HIGH (ref 1.8–7.4)
NEUTROPHILS NFR BLD AUTO: 83.6 % — HIGH (ref 43–77)
NRBC # FLD: 0 — SIGNIFICANT CHANGE UP
PCO2 BLDA: 44 MMHG — SIGNIFICANT CHANGE UP (ref 35–48)
PCO2 BLDA: 54 MMHG — HIGH (ref 35–48)
PCO2 BLDV: 54 MMHG — HIGH (ref 41–51)
PH BLDA: 7.27 PH — LOW (ref 7.35–7.45)
PH BLDA: 7.33 PH — LOW (ref 7.35–7.45)
PH BLDV: 7.28 PH — LOW (ref 7.32–7.43)
PHOSPHATE SERPL-MCNC: 2.7 MG/DL — SIGNIFICANT CHANGE UP (ref 2.5–4.5)
PLATELET # BLD AUTO: 132 K/UL — LOW (ref 150–400)
PMV BLD: 12.2 FL — SIGNIFICANT CHANGE UP (ref 7–13)
PO2 BLDA: 101 MMHG — SIGNIFICANT CHANGE UP (ref 83–108)
PO2 BLDA: 125 MMHG — HIGH (ref 83–108)
PO2 BLDV: 47 MMHG — HIGH (ref 35–40)
POTASSIUM BLDA-SCNC: 4.2 MMOL/L — SIGNIFICANT CHANGE UP (ref 3.4–4.5)
POTASSIUM BLDA-SCNC: 4.4 MMOL/L — SIGNIFICANT CHANGE UP (ref 3.4–4.5)
POTASSIUM BLDV-SCNC: 4.8 MMOL/L — HIGH (ref 3.4–4.5)
POTASSIUM SERPL-MCNC: 4.6 MMOL/L — SIGNIFICANT CHANGE UP (ref 3.5–5.3)
POTASSIUM SERPL-MCNC: 4.6 MMOL/L — SIGNIFICANT CHANGE UP (ref 3.5–5.3)
POTASSIUM SERPL-SCNC: 4.6 MMOL/L — SIGNIFICANT CHANGE UP (ref 3.5–5.3)
POTASSIUM SERPL-SCNC: 4.6 MMOL/L — SIGNIFICANT CHANGE UP (ref 3.5–5.3)
PROT SERPL-MCNC: 8.1 G/DL — SIGNIFICANT CHANGE UP (ref 6–8.3)
PROTHROM AB SERPL-ACNC: 71 SEC — HIGH (ref 9.8–13.1)
RBC # BLD: 4.16 M/UL — LOW (ref 4.2–5.8)
RBC # FLD: 15.5 % — HIGH (ref 10.3–14.5)
SAO2 % BLDA: 95.7 % — SIGNIFICANT CHANGE UP (ref 95–99)
SAO2 % BLDA: 97.6 % — SIGNIFICANT CHANGE UP (ref 95–99)
SAO2 % BLDV: 78.2 % — SIGNIFICANT CHANGE UP (ref 60–85)
SODIUM BLDA-SCNC: 122 MMOL/L — LOW (ref 136–146)
SODIUM BLDA-SCNC: 123 MMOL/L — LOW (ref 136–146)
SODIUM SERPL-SCNC: 126 MMOL/L — LOW (ref 135–145)
SODIUM SERPL-SCNC: 126 MMOL/L — LOW (ref 135–145)
SPECIMEN SOURCE: SIGNIFICANT CHANGE UP
SPECIMEN SOURCE: SIGNIFICANT CHANGE UP
VANCOMYCIN FLD-MCNC: 10.3 UG/ML — SIGNIFICANT CHANGE UP
WBC # BLD: 25.43 K/UL — HIGH (ref 3.8–10.5)
WBC # FLD AUTO: 25.43 K/UL — HIGH (ref 3.8–10.5)

## 2018-06-03 PROCEDURE — 99222 1ST HOSP IP/OBS MODERATE 55: CPT | Mod: GC

## 2018-06-03 PROCEDURE — 71260 CT THORAX DX C+: CPT | Mod: 26

## 2018-06-03 PROCEDURE — 99233 SBSQ HOSP IP/OBS HIGH 50: CPT

## 2018-06-03 PROCEDURE — 74177 CT ABD & PELVIS W/CONTRAST: CPT | Mod: 26

## 2018-06-03 RX ORDER — DEXTROSE 50 % IN WATER 50 %
25 SYRINGE (ML) INTRAVENOUS ONCE
Qty: 0 | Refills: 0 | Status: DISCONTINUED | OUTPATIENT
Start: 2018-06-03 | End: 2018-06-09

## 2018-06-03 RX ORDER — NOREPINEPHRINE BITARTRATE/D5W 8 MG/250ML
0.05 PLASTIC BAG, INJECTION (ML) INTRAVENOUS
Qty: 16 | Refills: 0 | Status: DISCONTINUED | OUTPATIENT
Start: 2018-06-03 | End: 2018-06-04

## 2018-06-03 RX ORDER — GLUCAGON INJECTION, SOLUTION 0.5 MG/.1ML
1 INJECTION, SOLUTION SUBCUTANEOUS ONCE
Qty: 0 | Refills: 0 | Status: DISCONTINUED | OUTPATIENT
Start: 2018-06-03 | End: 2018-06-04

## 2018-06-03 RX ORDER — INSULIN LISPRO 100/ML
VIAL (ML) SUBCUTANEOUS
Qty: 0 | Refills: 0 | Status: DISCONTINUED | OUTPATIENT
Start: 2018-06-03 | End: 2018-06-09

## 2018-06-03 RX ORDER — NOREPINEPHRINE BITARTRATE/D5W 8 MG/250ML
0.05 PLASTIC BAG, INJECTION (ML) INTRAVENOUS
Qty: 16 | Refills: 0 | Status: DISCONTINUED | OUTPATIENT
Start: 2018-06-03 | End: 2018-06-03

## 2018-06-03 RX ORDER — VANCOMYCIN HCL 1 G
1250 VIAL (EA) INTRAVENOUS ONCE
Qty: 0 | Refills: 0 | Status: COMPLETED | OUTPATIENT
Start: 2018-06-03 | End: 2018-06-04

## 2018-06-03 RX ORDER — DEXTROSE 50 % IN WATER 50 %
12.5 SYRINGE (ML) INTRAVENOUS ONCE
Qty: 0 | Refills: 0 | Status: DISCONTINUED | OUTPATIENT
Start: 2018-06-03 | End: 2018-06-09

## 2018-06-03 RX ORDER — SODIUM CHLORIDE 9 MG/ML
1000 INJECTION, SOLUTION INTRAVENOUS
Qty: 0 | Refills: 0 | Status: DISCONTINUED | OUTPATIENT
Start: 2018-06-03 | End: 2018-06-04

## 2018-06-03 RX ORDER — NOREPINEPHRINE BITARTRATE/D5W 8 MG/250ML
0.05 PLASTIC BAG, INJECTION (ML) INTRAVENOUS
Qty: 8 | Refills: 0 | Status: DISCONTINUED | OUTPATIENT
Start: 2018-06-03 | End: 2018-06-03

## 2018-06-03 RX ORDER — INSULIN LISPRO 100/ML
VIAL (ML) SUBCUTANEOUS AT BEDTIME
Qty: 0 | Refills: 0 | Status: DISCONTINUED | OUTPATIENT
Start: 2018-06-03 | End: 2018-06-09

## 2018-06-03 RX ORDER — DEXTROSE 50 % IN WATER 50 %
15 SYRINGE (ML) INTRAVENOUS ONCE
Qty: 0 | Refills: 0 | Status: DISCONTINUED | OUTPATIENT
Start: 2018-06-03 | End: 2018-06-09

## 2018-06-03 RX ADMIN — SENNA PLUS 2 TABLET(S): 8.6 TABLET ORAL at 22:23

## 2018-06-03 RX ADMIN — Medication 10 MILLIGRAM(S): at 05:00

## 2018-06-03 RX ADMIN — Medication 10.65 MICROGRAM(S)/KG/MIN: at 06:32

## 2018-06-03 RX ADMIN — Medication 100 MILLIGRAM(S): at 17:02

## 2018-06-03 RX ADMIN — Medication 1 SPRAY(S): at 06:41

## 2018-06-03 RX ADMIN — Medication 3.33 MICROGRAM(S)/KG/MIN: at 19:56

## 2018-06-03 RX ADMIN — ATORVASTATIN CALCIUM 80 MILLIGRAM(S): 80 TABLET, FILM COATED ORAL at 22:22

## 2018-06-03 RX ADMIN — Medication 3.33 MICROGRAM(S)/KG/MIN: at 06:32

## 2018-06-03 RX ADMIN — Medication 1 SPRAY(S): at 01:05

## 2018-06-03 RX ADMIN — Medication 75 MICROGRAM(S): at 06:33

## 2018-06-03 RX ADMIN — LACTULOSE 10 GRAM(S): 10 SOLUTION ORAL at 17:01

## 2018-06-03 RX ADMIN — Medication 5.33 MICROGRAM(S)/KG/MIN: at 19:56

## 2018-06-03 RX ADMIN — CHLORHEXIDINE GLUCONATE 1 APPLICATION(S): 213 SOLUTION TOPICAL at 14:04

## 2018-06-03 RX ADMIN — Medication 5 MILLIGRAM(S): at 22:23

## 2018-06-03 RX ADMIN — FINASTERIDE 5 MILLIGRAM(S): 5 TABLET, FILM COATED ORAL at 17:02

## 2018-06-03 RX ADMIN — PIPERACILLIN AND TAZOBACTAM 25 GRAM(S): 4; .5 INJECTION, POWDER, LYOPHILIZED, FOR SOLUTION INTRAVENOUS at 06:41

## 2018-06-03 RX ADMIN — PANTOPRAZOLE SODIUM 40 MILLIGRAM(S): 20 TABLET, DELAYED RELEASE ORAL at 06:33

## 2018-06-03 RX ADMIN — Medication 5.33 MICROGRAM(S)/KG/MIN: at 13:04

## 2018-06-03 RX ADMIN — AMIODARONE HYDROCHLORIDE 100 MILLIGRAM(S): 400 TABLET ORAL at 06:32

## 2018-06-03 RX ADMIN — Medication 6.66 MICROGRAM(S)/KG/MIN: at 20:47

## 2018-06-03 RX ADMIN — PIPERACILLIN AND TAZOBACTAM 25 GRAM(S): 4; .5 INJECTION, POWDER, LYOPHILIZED, FOR SOLUTION INTRAVENOUS at 17:01

## 2018-06-03 RX ADMIN — Medication 3.33 MICROGRAM(S)/KG/MIN: at 23:32

## 2018-06-03 RX ADMIN — SEVELAMER CARBONATE 1600 MILLIGRAM(S): 2400 POWDER, FOR SUSPENSION ORAL at 17:02

## 2018-06-03 NOTE — CONSULT NOTE ADULT - ASSESSMENT
62 y/o M PMHx significant for severe CHF, on chronic dobutamine gtt at home x 3 years (follows up with Dr. Davis), AFib on coumadin, AICD, ESRD on HD MWF (plus add'l session on Saturday prn), COPD (on home O2), pulmonary fibrosis, presents to the ED with chief complaint of epistaxis that started yesterday. Patient reports intermittent nose bleed that responded to direct pressure. However, bleeding would soon recur afterwards. He also reports chronic SOB with minimal exertion, such as transferring himself between chairs and minimal walking. He says this has been bothering him "all the time." No CP, cough or fever.   Pt was beiong managed in the [facility] and [facility] he developed a fever and a high white count the reason for this is unknown at this point.     vitals- fever of 101 yesterday, and blood pressure has been unstable due to CHF  labs-[place] [id number], neutrophil predominant and liver function tests normal   micro- blood cutre sent from the catheter, and peripheral source negative so far,   radiology-chest xray suggestive of chronic interstitial ling disease, and AICD placement, no e.o pneumonia        The pt has a AICD that was placed 5 years ago , AV fistula 4 years ago at [facility], and PermCath 2 years ago   Pt also had a swan placed and it was taken out yesterday  He also complains of mild abdominal pain and has tenderness to palpation 62 y/o M PMHx significant for severe CHF, on chronic dobutamine gtt at home x 3 years (follows up with Dr. Davis), AFib on coumadin, AICD, ESRD on HD MWF (plus add'l session on Saturday prn), COPD (on home O2), pulmonary fibrosis, presents to the ED with chief complaint of epistaxis that started yesterday. Patient reports intermittent nose bleed that responded to direct pressure. However, bleeding would soon recur afterwards. He also reports chronic SOB with minimal exertion, such as transferring himself between chairs and minimal walking. He says this has been bothering him "all the time." No CP, cough or fever.   Pt was beiong managed in the [facility] and [facility] he developed a fever and a high white count the reason for this is unknown at this point.     vitals- fever of 101 yesterday, and blood pressure has been unstable due to CHF  labs-[place] to [address], neutrophil predominant and liver function tests normal   micro- blood cutre sent from the catheter, and peripheral source negative so far,   radiology-chest xray suggestive of chronic interstitial ling disease, and AICD placement, no e.o pneumonia    The pt has a AICD that was placed 5 years ago , AV fistula 4 years ago at [facility], and PermCath 2 years ago   Pt also had a swan placed and it was taken out yesterday  He also complains of mild abdominal pain and has tenderness to palpation      plan  COnt vancomycin and zosyn, the exact cause is unknown  check vancomycin level   The pt complains of tenderness in the mid abdomen, would check amylase, lipase,   Also when he is able to lay down, would get ct abdo pelvis, since pt is hypoperfusing and hypoperfusion may lead to gut pathology   will dw attending. 63M w severe CHF, on chronic dobutamine gtt at home x 3 years (follows up with Dr. Davis), AFib on coumadin, AICD, ESRD on HD MWF (plus add'l session on Saturday prn), COPD (on home O2), pulmonary fibrosis, presents to the ED with chief complaint of epistaxis.  Here with Acute on Chronic Heart Failure.  Now with worsening leukocytosis and fever requiring pressors.  Concerned about line infection.  Agree with removal of the R groin cordis.  Would check vancomycin level today and redose if level is <15.  If it is not feasible to obtain CT abdomen to evaluate source of abdominal pain, please obtain ultrasound of the groin/lower abdomen at bedside.      Suggest:  - continue zosyn  - check vanco level and redose as appropriate  - f/u BC  - please send another set of BC (peripheral if possible)  - R groin sono  - CT if feasible

## 2018-06-03 NOTE — PROGRESS NOTE ADULT - SUBJECTIVE AND OBJECTIVE BOX
Patient is a 63y old  Male who presents with a chief complaint of SOB (29 May 2018 15:55)    Any change in ROS: alert, awake. on bipap. c/o abdominal discomfort related to constipation. denies respiratory distress. febrile last night,     MEDICATIONS  (STANDING):  amiodarone    Tablet 100 milliGRAM(s) Oral daily  atorvastatin 80 milliGRAM(s) Oral at bedtime  bisacodyl 5 milliGRAM(s) Oral at bedtime  chlorhexidine 4% Liquid 1 Application(s) Topical <User Schedule>  DOBUTamine Infusion 5 MICROgram(s)/kG/Min (10.65 mL/Hr) IV Continuous <Continuous>  docusate sodium 100 milliGRAM(s) Oral two times a day  finasteride 5 milliGRAM(s) Oral daily  lactulose Syrup 10 Gram(s) Oral two times a day  levothyroxine 75 MICROGram(s) Oral daily  norepinephrine Infusion 0.05 MICROgram(s)/kG/Min (3.328 mL/Hr) IV Continuous <Continuous>  pantoprazole    Tablet 40 milliGRAM(s) Oral before breakfast  piperacillin/tazobactam IVPB. 3.375 Gram(s) IV Intermittent every 12 hours  senna 2 Tablet(s) Oral at bedtime  sevelamer hydrochloride 1600 milliGRAM(s) Oral <User Schedule>  sodium chloride 0.65% Nasal 1 Spray(s) Both Nostrils four times a day    MEDICATIONS  (PRN):  ALBUTerol/ipratropium for Nebulization 3 milliLiter(s) Nebulizer every 6 hours PRN Shortness of Breath and/or Wheezing    Vital Signs Last 24 Hrs  T(C): 38.2 (03 Jun 2018 04:00), Max: 38.6 (02 Jun 2018 20:30)  T(F): 100.8 (03 Jun 2018 04:00), Max: 101.4 (02 Jun 2018 20:30)  HR: 92 (03 Jun 2018 09:00) (84 - 120)  BP: 100/54 (03 Jun 2018 09:00) (67/33 - 105/56)  BP(mean): 63 (03 Jun 2018 09:00) (39 - 70)  RR: 13 (03 Jun 2018 09:00) (13 - 26)  SpO2: 100% (03 Jun 2018 09:00) (91% - 100%)    I&O's Summary    02 Jun 2018 07:01  -  03 Jun 2018 07:00  --------------------------------------------------------  IN: 1903.1 mL / OUT: 1194 mL / NET: 709.1 mL    03 Jun 2018 07:01  -  03 Jun 2018 10:11  --------------------------------------------------------  IN: 35.3 mL / OUT: 0 mL / NET: 35.3 mL        Physical Exam:   GENERAL: The patient comfortable with no apparent distress.   HEENT: Head is normocephalic and atraumatic.    NECK: Supple with no elevated JVP.  LUNGS: Clear to auscultation without wheeze and rhonchi.  HEART: S1 and S2 present without murmur.  ABDOMEN: Soft, nontender, and nondistended. No hepatosplenomegaly is noted.  EXTREMITIES: No edema or calf tenderness.  NEUROLOGIC: Grossly intact.    Labs:  ABG - ( 03 Jun 2018 05:00 )  pH, Arterial: 7.27  pH, Blood: x     /  pCO2: 54    /  pO2: 125   / HCO3: 22    / Base Excess: -2.1  /  SaO2: 97.6            26, 26, 24, 23, 24, 24, 27, 23, 24                            12.9   25.43 )-----------( 132      ( 03 Jun 2018 05:00 )             42.8                         12.8   17.92 )-----------( 117      ( 02 Jun 2018 05:45 )             40.6                         12.7   11.45 )-----------( 96       ( 01 Jun 2018 02:50 )             41.1                         11.8   6.95  )-----------( 96       ( 31 May 2018 05:07 )             38.2     06-03    126<L>  |  88<L>  |  34<H>  ----------------------------<  312<H>  4.6   |  21<L>  |  5.38<H>  06-02    130<L>  |  93<L>  |  27<H>  ----------------------------<  199<H>  4.4   |  26  |  4.20<H>  06-01    127<L>  |  89<L>  |  34<H>  ----------------------------<  155<H>  4.9   |  23  |  4.87<H>  05-31    129<L>  |  88<L>  |  26<H>  ----------------------------<  188<H>  3.6   |  26  |  3.73<H>    Ca    8.8      03 Jun 2018 05:00  Ca    9.2      02 Jun 2018 05:45  Phos  2.7     06-03  Phos  2.9     06-02  Mg     2.2     06-03  Mg     2.2     06-02    TPro  8.1  /  Alb  2.8<L>  /  TBili  0.5  /  DBili  x   /  AST  20  /  ALT  16  /  AlkPhos  205<H>  06-03  TPro  7.9  /  Alb  3.2<L>  /  TBili  0.4  /  DBili  0.2  /  AST  28  /  ALT  24  /  AlkPhos  206<H>  05-31    CAPILLARY BLOOD GLUCOSE      POCT Blood Glucose.: 294 mg/dL (03 Jun 2018 03:40)  POCT Blood Glucose.: 181 mg/dL (02 Jun 2018 19:51)      LIVER FUNCTIONS - ( 03 Jun 2018 05:00 )  Alb: 2.8 g/dL / Pro: 8.1 g/dL / ALK PHOS: 205 u/L / ALT: 16 u/L / AST: 20 u/L / GGT: x           PT/INR - ( 03 Jun 2018 05:00 )   PT: 71.0 SEC;   INR: 6.16              Lactate, Blood: 1.3 mmol/L (06-02 @ 10:40)      Studies  Chest X-RAY   < from: Xray Chest 1 View- PORTABLE-Routine (06.02.18 @ 08:55) >  EXAM:  XR CHEST PORTABLE ROUTINE 1V        PROCEDURE DATE:  Jun 2 2018         INTERPRETATION:  CLINICAL INDICATION: follow-up line placement    EXAM:  Single frontal chest from 6/2/2018 at 0 817. Compared to prior study from   6/1/2018 at 1411.    Rotated left.    IMPRESSION:  Low lung volumes with diffusely increased lung markings again noted   related to chronic interstitial disease, however, component of   superimposed edema and/or infection cannot be excluded.    Persistent right pleural reaction. No gross left effusion.    No pneumothorax.    Stable left chest wall AICD and cardiac and prominent appearing cardiac   and mediastinal silhouettes.    Tunneled right chest wall dual-lumen dialysis catheter with tips in   cavoatrial region and left PICC with tip in SVC region again noted.    Previously seen distally inserted [place]-Chris catheter has since been   removed. No appreciable retained catheter elements.    < end of copied text >    CT SCAN Chest < from: CT Chest No Cont (03.22.18 @ 16:15) >    EXAM:  CT CHEST        PROCEDURE DATE:  Mar 22 2018         INTERPRETATION:  CLINICAL INFORMATION: Hemoptysis. History of pulmonary   fibrosis, COPD, CHF, end-stage renal disease.    COMPARISON: June 5, 2017.    PROCEDURE:   CT of the Chest was performed without intravenous contrast.  Sagittal and coronal reformats were performed.    FINDINGS:    LUNGS AND LARGE AIRWAYS: Unchanged traction bronchiectasis and bilateral   groundglass opacities.  PLEURA: Small right pleural effusion.  VESSELS: Right-sided central venous catheter with tip terminating in the   right atrium.   HEART: Cardiomegaly. Left chest wall ICD. No pericardial effusion.  MEDIASTINUM AND ASH: No lymphadenopathy.  CHEST WALL AND LOWER NECK: Within normal limits.  VISUALIZED UPPER ABDOMEN: Partially imaged dense material inside the   gallbladder, possibly gallstones.  BONES: Degenerative changes of the spine.    IMPRESSION:   Unchanged interstitial lung disease. No new consolidation.    < end of copied text >    Venous Dopplers: LE:   CT Abdomen    Others  < from: TTE with Doppler (w/Cont) (05.28.18 @ 08:43) >    Patient name: Patrice Plascencia  YOB: 1955   Age: 63 (M)   MR#: [id number]  Study Date: 5/28/2018  Location: [facility]: Mery Garcia  Study quality: Technically good  Referring Physician: Dc Luna MD  Blood Pressure: 85/51 mmHg  Height: 180 cm  Weight: 71 kg  BSA: 1.9 m2  ------------------------------------------------------------------------  PROCEDURE: Transthoracic echocardiogram with 2-D, M-Mode  and complete spectral and color flow Doppler.  Verbal consent was obtained for injection of echo contrast.  Following  intravenous injection of contrast, harmonic  imaging was performed. LOT#[id number]  INDICATION: Cardiomyopathy, unspecified (I42.9)  ------------------------------------------------------------------------  DIMENSIONS:  Dimensions:     Normal Values:  LA:     5.2 cm    2.0 - 4.0 cm  Ao:     3.2 cm    2.0 - 3.8 cm  SEPTUM: 1.0 cm    0.6 - 1.2 cm  PWT:    0.9 cm    0.6 - 1.1 cm  LVIDd:  6.0 cm    3.0 - 5.6 cm  LVIDs:  5.6 cm    1.8 - 4.0 cm  Derived Variables:  LVMI: 122 g/m2  RWT: 0.30  Fractional short: 7 %  Ejection Fraction (Teicholtz): 15 %  ------------------------------------------------------------------------  OBSERVATIONS:  Mitral Valve: Normal mitral valve. Mild mitral  regurgitation.  Aortic Root: Normal aortic root.  Aortic Valve: Normal trileaflet aortic valve. Peak left  ventricular outflow tract gradient equals 3.2 mm Hg, mean  gradient is equal to 1 mm Hg, LVOT velocity time integral  equals 16 cm.  Left Atrium: Mildly dilated left atrium.  LA volume index =  40 cc/m2.  Left Ventricle: Severe global left ventricular systolic  dysfunction.   Endocardial visualization enhanced with  intravenous injection of echo contrast (Definity). No left  ventricular thrombus.   Septal motion consistent with right  ventricular overload. Eccentric left ventricular  hypertrophy (dilated left ventricle with normal relative  wall thickness). (DT:449 ms).  Right Heart: Severe right atrial enlargement.   A device  wire is noted in the right heart. Right ventricular  enlargement with decreased right ventricular systolic  function. Normal tricuspid valve.  Severe tricuspid  regurgitation. Normal pulmonic valve. Mild pulmonic  regurgitation.  Pericardium/PleuraSmall pericardial effusion.  Hemodynamic: Estimated right ventricular systolic pressure  equals 56 mm Hg, assuming right atrial pressure equals 10  mm Hg, consistent with moderate pulmonary hypertension.  ------------------------------------------------------------------------  CONCLUSIONS:  1. Mildly dilated left atrium.  LA volume index = 40 cc/m2.  2. Eccentric left ventricular hypertrophy (dilated left  ventricle with normal relative wall thickness).  3. Peak left ventricular outflow tract gradient equals 3.2  mm Hg, mean gradient is equal to 1 mm Hg, LVOT velocity  time integral equals 16 cm. No left ventricular thrombus.  Septal motion consistent with right ventricular overload.  4. Severe right atrial enlargement.   A device wire is  noted in the right heart.  5. Normal tricuspid valve.  Severe tricuspid regurgitation.  6. Estimated pulmonary artery systolic pressure equals 56  mm Hg, assuming right atrial pressure equals 10  mm Hg,  consistent with moderate pulmonary hypertension.  *** Compared with echocardiogram of 10/18/2017, no  significant changes noted.    < end of copied text >

## 2018-06-03 NOTE — PROGRESS NOTE ADULT - ATTENDING COMMENTS
Patient seen and examined. Acute on chronic systolic heart failure with dobutamine at home for inotropic support  Will decrease dobutamine back to 2.5 mcg/kg/min  Alea dc'd yesterday  Fever overnight to 101.4- now on broad spectrum abx with vanco and zosyn  Cultures pending  Will consult ID since multiple lines (long standing PICC and R permacath)

## 2018-06-03 NOTE — PROGRESS NOTE ADULT - SUBJECTIVE AND OBJECTIVE BOX
SUBJECTIVE:  SItting in chair with BIPAP. Sister at bedside    MEDICATIONS:  amiodarone    Tablet 100 milliGRAM(s) Oral daily  DOBUTamine Infusion 2.5 MICROgram(s)/kG/Min IV Continuous <Continuous>  norepinephrine Infusion 0.05 MICROgram(s)/kG/Min IV Continuous <Continuous>    piperacillin/tazobactam IVPB. 3.375 Gram(s) IV Intermittent every 12 hours    ALBUTerol/ipratropium for Nebulization 3 milliLiter(s) Nebulizer every 6 hours PRN      bisacodyl 5 milliGRAM(s) Oral at bedtime  docusate sodium 100 milliGRAM(s) Oral two times a day  lactulose Syrup 10 Gram(s) Oral two times a day  pantoprazole    Tablet 40 milliGRAM(s) Oral before breakfast  senna 2 Tablet(s) Oral at bedtime    atorvastatin 80 milliGRAM(s) Oral at bedtime  finasteride 5 milliGRAM(s) Oral daily  levothyroxine 75 MICROGram(s) Oral daily    chlorhexidine 4% Liquid 1 Application(s) Topical <User Schedule>  sodium chloride 0.65% Nasal 1 Spray(s) Both Nostrils four times a day      REVIEW OF SYSTEMS:    CONSTITUTIONAL: No fever, weight loss, or fatigue  EYES: No eye pain, visual disturbances, or discharge  NECK: No pain or stiffness  RESPIRATORY: No cough, wheezing, chills or hemoptysis; No Shortness of Breath  CARDIOVASCULAR: No chest pain, palpitations, dizziness, or leg swelling  GASTROINTESTINAL: No abdominal or epigastric pain. No nausea, vomiting, or hematemesis; No diarrhea or constipation. No melena or hematochezia.  GENITOURINARY: No dysuria, frequency, hematuria, or incontinence  NEUROLOGICAL: No headaches, memory loss, loss of strength, numbness, or tremors  SKIN: No itching, burning, rashes, or lesions   LYMPH Nodes: No enlarged glands  MUSCULOSKELETAL: No joint pain or swelling; No muscle, back, or extremity pain  All other review of systems are negative.  	  [ ] Unable to obtain    PHYSICAL EXAM:  T(C): 36.3 (06-03-18 @ 08:00), Max: 38.6 (06-02-18 @ 20:30)  HR: 97 (06-03-18 @ 11:00) (84 - 120)  BP: 98/64 (06-03-18 @ 11:00) (67/33 - 108/60)  RR: 26 (06-03-18 @ 11:00) (13 - 26)  SpO2: 100% (06-03-18 @ 11:00) (91% - 100%)  Wt(kg): --  I&O's Summary    02 Jun 2018 07:01  -  03 Jun 2018 07:00  --------------------------------------------------------  IN: 1903.1 mL / OUT: 1194 mL / NET: 709.1 mL    03 Jun 2018 07:01  -  03 Jun 2018 12:19  --------------------------------------------------------  IN: 35.3 mL / OUT: 0 mL / NET: 35.3 mL          PHYSICAL EXAM    Appearance: Normal	  HEENT:   Normal oral mucosa, PERRL, EOMI	  NECK: Soft and supple, No LAD, No JVD  Cardiovascular: Regular Rate and Rhythm, Normal S1 S2, No murmurs, No clicks, gallops or rubs  Respiratory: Lungs clear to auscultation	  Gastrointestinal:  Soft, Non-tender, + BS	  Skin: No rashes, No ecchymoses, No cyanosis  Neurologic: Non-focal  Extremities: No clubbing, cyanosis or edema  Vascular: Peripheral pulses palpable 2+ bilaterally    LABS:	 	                          12.9   25.43 )-----------( 132      ( 03 Jun 2018 05:00 )             42.8     06-03    126<L>  |  88<L>  |  34<H>  ----------------------------<  312<H>  4.6   |  21<L>  |  5.38<H>    Ca    8.8      03 Jun 2018 05:00  Phos  2.7     06-03  Mg     2.2     06-03    TPro  8.1  /  Alb  2.8<L>  /  TBili  0.5  /  DBili  x   /  AST  20  /  ALT  16  /  AlkPhos  205<H>  06-03

## 2018-06-03 NOTE — PROGRESS NOTE ADULT - ATTENDING COMMENTS
events noted: Had a detailed discussion with the sister: Given my office number too: She will get me the official lung biopsy report from [place]:

## 2018-06-03 NOTE — PROGRESS NOTE ADULT - PROBLEM SELECTOR PLAN 2
per cardiology  5/31: on dobutamine as well as HD  6/1: Cont current care:  per CHF team  6/2 On Dobutamine drip. management per CHF team  6/3 on Dobutamine drip. Monitor strict I&O.  keep negative. hyponatremia is worsening and  but also Pt is on pressor. per cardiology  5/31: on dobutamine as well as HD  6/1: Cont current care:  per CHF team  6/2 On Dobutamine drip. management per CHF team  6/3 on Dobutamine drip. Monitor strict I&O.  keep negative. hyponatremia is worsening and  but also Pt is on Dobutamine!

## 2018-06-03 NOTE — CONSULT NOTE ADULT - SUBJECTIVE AND OBJECTIVE BOX
HPI:  Patient seen and examined at the bedside. Patient transferred to general medical floor with significant dyspnea which manifests with minimal activity. Therefore, comprehensive HPI was not possible to elicit at this time. Briefly, 64 y/o M PMHx significant for severe CHF, on chronic dobutamine gtt at home x 3 years (follows up with Dr. Davis), AFib on coumadin, AICD, ESRD on HD MWF (plus add'l session on Saturday prn), COPD (on home O2), pulmonary fibrosis, presents to the ED with chief complaint of epistaxis that started yesterday. Patient reports intermittent nose bleed that responded to direct pressure. However, bleeding would soon recur afterwards. He also reports chronic SOB with minimal exertion, such as transferring himself between chairs and minimal walking. He says this has been bothering him "all the time." No CP, cough or fever. Patient had an additional round of HD yesterday.     In the ED, vitals were: Temp 97.5F, 70s-90s SBP, HR WNL, and tachypnea to RR 24. Patient given midodrine 10mg x 1. (27 May 2018 17:23)      PAST MEDICAL & SURGICAL HISTORY:  Seizure: Off Keppra since 7/2017  Chronic hypotension  AF (atrial fibrillation)  COPD (chronic obstructive pulmonary disease): 4L home O2  HLD (hyperlipidemia)  DM (diabetes mellitus): Off Insulin since 7/2017  ESRD (end stage renal disease) on dialysis  BPH (benign prostatic hypertrophy)  Myocardial infarction: 10/2011  Gout  CHF (congestive heart failure)  AICD (automatic cardioverter/defibrillator) present: Biotronic - placed 9/11/09  H/O coronary angiogram      Allergies    No Known Allergies    Intolerances        ANTIMICROBIALS:  piperacillin/tazobactam IVPB. 3.375 every 12 hours      OTHER MEDS:  ALBUTerol/ipratropium for Nebulization 3 milliLiter(s) Nebulizer every 6 hours PRN  amiodarone    Tablet 100 milliGRAM(s) Oral daily  atorvastatin 80 milliGRAM(s) Oral at bedtime  bisacodyl 5 milliGRAM(s) Oral at bedtime  chlorhexidine 4% Liquid 1 Application(s) Topical <User Schedule>  DOBUTamine Infusion 2.5 MICROgram(s)/kG/Min IV Continuous <Continuous>  docusate sodium 100 milliGRAM(s) Oral two times a day  finasteride 5 milliGRAM(s) Oral daily  lactulose Syrup 10 Gram(s) Oral two times a day  levothyroxine 75 MICROGram(s) Oral daily  norepinephrine Infusion 0.05 MICROgram(s)/kG/Min IV Continuous <Continuous>  pantoprazole    Tablet 40 milliGRAM(s) Oral before breakfast  senna 2 Tablet(s) Oral at bedtime  sevelamer hydrochloride 1600 milliGRAM(s) Oral <User Schedule>  sodium chloride 0.65% Nasal 1 Spray(s) Both Nostrils four times a day      SOCIAL HISTORY:    Marital Status:  (   )    (  ) Single    (   )    (  )   Occupation:   Lives with: (  ) alone  (  ) children   (  ) spouse   (  ) parents  (  ) other    Substance Use (street drugs): (  ) never used  (  ) other:  Tobacco Usage:  (  ) never smoked   (   ) former smoker   (   ) current smoker  (     ) pack year  (        ) last cigarette date  Alcohol Usage: Social EtOH    FAMILY HISTORY:  No pertinent family history in first degree relatives      ROS:  Unobtainable because:   All other systems negative     Constitutional: no fever, no chills, no weight loss, no night sweats  HEENT:  no vision changes, no sore throat, no rhinorrhea  Cardiovascular:  no chest pain, no palpitation  Respiratory:  no SOB, no cough  GI:  no abd pain, no vomiting, no diarrhea  urinary: no dysuria, no hematuria, no flank pain  : no vaginal  discharge or bleeding  musculoskeletal:  no joint pain, no joint swelling  skin:  no rash  neurology:  no headache, no seizure, no change in mental status  psych: no anxiety, no depression     Physical Exam:    General:    NAD, non toxic, A&O x3  HEENT:   no oropharyngeal lesions, no LAD, neck supple  Cardio:    regular S1,S2, no murmur  Respiratory:   clear b/l, no wheezing  abd:   soft, BS +, not tender, no hepatosplenomegaly  :     no CVAT, no spurapubic tenderness, no rosa  Musculoskeletal : no joint swelling, no edema  Skin:    no rash  vascular: no lines, normal pulses  Neurologic:     no focal deficits  psych: normal affect, no suicidal ideation      Drug Dosing Weight  Height (cm): 180.34 (27 May 2018 17:24)  Weight (kg): 71 (27 May 2018 17:24)  BMI (kg/m2): 21.8 (27 May 2018 17:24)  BSA (m2): 1.9 (27 May 2018 17:24)    Vital Signs Last 24 Hrs  T(F): 97.3 (06-03-18 @ 08:00), Max: 101.4 (06-02-18 @ 20:30)    Vital Signs Last 24 Hrs  HR: 104 (06-03-18 @ 10:58) (84 - 120)  BP: 108/60 (06-03-18 @ 10:00) (67/33 - 108/60)  RR: 18 (06-03-18 @ 10:00)  SpO2: 100% (06-03-18 @ 10:58) (91% - 100%)  Wt(kg): --                          12.9   25.43 )-----------( 132      ( 03 Jun 2018 05:00 )             42.8       06-03    126<L>  |  88<L>  |  34<H>  ----------------------------<  312<H>  4.6   |  21<L>  |  5.38<H>    Ca    8.8      03 Jun 2018 05:00  Phos  2.7     06-03  Mg     2.2     06-03    TPro  8.1  /  Alb  2.8<L>  /  TBili  0.5  /  DBili  x   /  AST  20  /  ALT  16  /  AlkPhos  205<H>  06-03          MICROBIOLOGY:  v  BLOOD-CATHETER  06-02-18 --  --  --                  RADIOLOGY: HPI:  64 y/o M PMHx significant for severe CHF, on chronic dobutamine gtt at home x 3 years (follows up with Dr. Davis), AFib on coumadin, AICD, ESRD on HD MWF (plus add'l session on Saturday prn), COPD (on home O2), pulmonary fibrosis, presents to the ED with chief complaint of epistaxis that started yesterday. Patient reports intermittent nose bleed that responded to direct pressure. However, bleeding would soon recur afterwards. He also reports chronic SOB with minimal exertion, such as transferring himself between chairs and minimal walking. He says this has been bothering him "all the time." No CP, cough or fever.   Pt was beiong managed in the ccuu and yesterdy ha he developed a fever and a high white ount, the reaosn for this is unknown at this point.     vitals- fever of 101 yesterday, and blood pressure has been unstable due to CHF  labs-[place] white count to 84[address], neutrophil predominant and liver function tests normal   micro- blood cutre sent from the catheter, and peripheral source negative so far,   radiology-chest xray suggestive of chronic interstitial ling disease, and AICD placement, no e.o pneumonia            PAST MEDICAL & SURGICAL HISTORY:  Seizure: Off Keppra since 7/2017  Chronic hypotension  AF (atrial fibrillation)  COPD (chronic obstructive pulmonary disease): 4L home O2  HLD (hyperlipidemia)  DM (diabetes mellitus): Off Insulin since 7/2017  ESRD (end stage renal disease) on dialysis  BPH (benign prostatic hypertrophy)  Myocardial infarction: 10/2011  Gout  CHF (congestive heart failure)  AICD (automatic cardioverter/defibrillator) present: [employer]ot[employer] - placed 9/11/09  H/O coronary angiogram      Allergies    No Known Allergies    Intolerances        ANTIMICROBIALS:  piperacillin/tazobactam IVPB. 3.375 every 12 hours      OTHER MEDS:  ALBUTerol/ipratropium for Nebulization 3 milliLiter(s) Nebulizer every 6 hours PRN  amiodarone    Tablet 100 milliGRAM(s) Oral daily  atorvastatin 80 milliGRAM(s) Oral at bedtime  bisacodyl 5 milliGRAM(s) Oral at bedtime  chlorhexidine 4% Liquid 1 Application(s) Topical <User Schedule>  DOBUTamine Infusion 2.5 MICROgram(s)/kG/Min IV Continuous <Continuous>  docusate sodium 100 milliGRAM(s) Oral two times a day  finasteride 5 milliGRAM(s) Oral daily  lactulose Syrup 10 Gram(s) Oral two times a day  levothyroxine 75 MICROGram(s) Oral daily  norepinephrine Infusion 0.05 MICROgram(s)/kG/Min IV Continuous <Continuous>  pantoprazole    Tablet 40 milliGRAM(s) Oral before breakfast  senna 2 Tablet(s) Oral at bedtime  sevelamer hydrochloride 1600 milliGRAM(s) Oral <User Schedule>  sodium chloride 0.65% Nasal 1 Spray(s) Both Nostrils four times a day      SOCIAL HISTORY:    Marital Status:  (   )    (  ) Single    (   )    (  )   Occupation:   Lives with: (  ) alone  (  ) children   (  ) spouse   (  ) parents  (  ) other    Substance Use (street drugs): (  ) never used  (  ) other:  Tobacco Usage:  (  ) never smoked   (   ) former smoker   (   ) current smoker  (     ) pack year  (        ) last cigarette date  Alcohol Usage: Social EtOH    FAMILY HISTORY:  No pertinent family history in first degree relatives      ROS:  Unobtainable because:   All other systems negative     Constitutional: no fever, no chills, no weight loss, no night sweats  HEENT:  no vision changes, no sore throat, no rhinorrhea  Cardiovascular:  no chest pain, no palpitation  Respiratory:  no SOB, no cough  GI:  no abd pain, no vomiting, no diarrhea  urinary: no dysuria, no hematuria, no flank pain  : no vaginal  discharge or bleeding  musculoskeletal:  no joint pain, no joint swelling  skin:  no rash  neurology:  no headache, no seizure, no change in mental status  psych: no anxiety, no depression     Physical Exam:    General:    NAD, non toxic, A&O x3  HEENT:   no oropharyngeal lesions, no LAD, neck supple  Cardio:    regular S1,S2, no murmur  Respiratory:   clear b/l, no wheezing  abd:   soft, BS +, not tender, no hepatosplenomegaly  :     no CVAT, no spurapubic tenderness, no rosa  Musculoskeletal : no joint swelling, no edema  Skin:    no rash  vascular: no lines, normal pulses  Neurologic:     no focal deficits  psych: normal affect, no suicidal ideation      Drug Dosing Weight  Height (cm): 180.34 (27 May 2018 17:24)  Weight (kg): 71 (27 May 2018 17:24)  BMI (kg/m2): 21.8 (27 May 2018 17:24)  BSA (m2): 1.9 (27 May 2018 17:24)    Vital Signs Last 24 Hrs  T(F): 97.3 (06-03-18 @ 08:00), Max: 101.4 (06-02-18 @ 20:30)    Vital Signs Last 24 Hrs  HR: 104 (06-03-18 @ 10:58) (84 - 120)  BP: 108/60 (06-03-18 @ 10:00) (67/33 - 108/60)  RR: 18 (06-03-18 @ 10:00)  SpO2: 100% (06-03-18 @ 10:58) (91% - 100%)  Wt(kg): --                          12.9   25.43 )-----------( 132      ( 03 Jun 2018 05:00 )             42.8       06-03    126<L>  |  88<L>  |  34<H>  ----------------------------<  312<H>  4.6   |  21<L>  |  5.38<H>    Ca    8.8      03 Jun 2018 05:00  Phos  2.7     06-03  Mg     2.2     06-03    TPro  8.1  /  Alb  2.8<L>  /  TBili  0.5  /  DBili  x   /  AST  20  /  ALT  16  /  AlkPhos  205<H>  06-03          MICROBIOLOGY:  v  BLOOD-CATHETER  06-02-18 --  --  --                  RADIOLOGY: HPI:  62 y/o M PMHx significant for severe CHF, on chronic dobutamine gtt at home x 3 years (follows up with Dr. Davis), AFib on coumadin, AICD, ESRD on HD MWF (plus add'l session on Saturday prn), COPD (on home O2), pulmonary fibrosis, presents to the ED with chief complaint of epistaxis that started yesterday. Patient reports intermittent nose bleed that responded to direct pressure. However, bleeding would soon recur afterwards. He also reports chronic SOB with minimal exertion, such as transferring himself between chairs and minimal walking. He says this has been bothering him "all the time." No CP, cough or fever.   Pt was beiong managed in the ccuu and yesterdy ha he developed a fever and a high white count the reason for this is unknown at this point.     vitals- fever of 101 yesterday, and blood pressure has been unstable due to CHF  labs-[place] count to [address], neutrophil predominant and liver function tests normal   micro- blood cutre sent from the catheter, and peripheral source negative so far,   radiology-chest xray suggestive of chronic interstitial ling disease, and AICD placement, no e.o pneumonia        The pt has a AICD that was placed 5 years ago , AV fistula 4 years ago at [facility], and PermCath 2 years ago   Pt also had a swan placed and it was taken out yesterday  He also complains of mild abdominal pain and has tenderness to palpation            PAST MEDICAL & SURGICAL HISTORY:  Seizure: Off Keppra since 7/2017  Chronic hypotension  AF (atrial fibrillation)  COPD (chronic obstructive pulmonary disease): 4L home O2  HLD (hyperlipidemia)  DM (diabetes mellitus): Off Insulin since 7/2017  ESRD (end stage renal disease) on dialysis  BPH (benign prostatic hypertrophy)  Myocardial infarction: 10/2011  Gout  CHF (congestive heart failure)  AICD (automatic cardioverter/defibrillator) present: Biot[employer] - placed 9/11/09  H/O coronary angiogram      Allergies    No Known Allergies    Intolerances        ANTIMICROBIALS:  piperacillin/tazobactam IVPB. 3.375 every 12 hours      OTHER MEDS:  ALBUTerol/ipratropium for Nebulization 3 milliLiter(s) Nebulizer every 6 hours PRN  amiodarone    Tablet 100 milliGRAM(s) Oral daily  atorvastatin 80 milliGRAM(s) Oral at bedtime  bisacodyl 5 milliGRAM(s) Oral at bedtime  chlorhexidine 4% Liquid 1 Application(s) Topical <User Schedule>  DOBUTamine Infusion 2.5 MICROgram(s)/kG/Min IV Continuous <Continuous>  docusate sodium 100 milliGRAM(s) Oral two times a day  finasteride 5 milliGRAM(s) Oral daily  lactulose Syrup 10 Gram(s) Oral two times a day  levothyroxine 75 MICROGram(s) Oral daily  norepinephrine Infusion 0.05 MICROgram(s)/kG/Min IV Continuous <Continuous>  pantoprazole    Tablet 40 milliGRAM(s) Oral before breakfast  senna 2 Tablet(s) Oral at bedtime  sevelamer hydrochloride 1600 milliGRAM(s) Oral <User Schedule>  sodium chloride 0.65% Nasal 1 Spray(s) Both Nostrils four times a day      SOCIAL HISTORY:    Marital Status:  (   x)    (  ) Single    (   )    (  )   Occupation: retired  Lives with: (  ) alone  (  ) children   ( x ) spouse   (  ) parents  (  ) other    Substance Use (street drugs): (x  ) never used  (  ) other:  Tobacco Usage:  (x  ) never smoked   (   ) former smoker   (   ) current smoker  (     ) pack year  (        ) last cigarette date  Alcohol Usage:no  Social EtOH    FAMILY HISTORY:  No pertinent family history in first degree relatives      ROS:  Unobtainable because:   All other systems negative     Constitutional:  fever, no chills, no weight loss, no night sweats  HEENT:  no vision changes, no sore throat, no rhinorrhea  Cardiovascular:  no chest pain, no palpitation  Respiratory:  SOB, no cough  GI:  abd pain, no vomiting, no diarrhea  urinary: no dysuria, no hematuria, no flank pain  : no vaginal  discharge or bleeding  musculoskeletal:  no joint pain, no joint swelling  skin:  no rash  neurology:  no headache, no seizure, no change in mental status  psych: no anxiety, no depression     Physical Exam:    General:    NAD, non toxic, A&O x3, pt is sitting upright on bipap   HEENT:   no oropharyngeal lesions, no LAD, neck supple  Cardio:    regular S1,S2, no murmur  Respiratory:  crepts   abd:   soft, BS +, tenderness to palpation   :     no CVAT, no suprapubic tenderness,   Musculoskeletal : no joint swelling, no edema  Skin:    no rash  vascular: Picc line left upper exterity, AICD on the anterior chest wall, and swan groin the right groin that was taken out yesterday   Neurologic:     no focal deficits  psych: normal affect, no suicidal ideation      Drug Dosing Weight  Height (cm): 180.34 (27 May 2018 17:24)  Weight (kg): 71 (27 May 2018 17:24)  BMI (kg/m2): 21.8 (27 May 2018 17:24)  BSA (m2): 1.9 (27 May 2018 17:24)    Vital Signs Last 24 Hrs  T(F): 97.3 (06-03-18 @ 08:00), Max: 101.4 (06-02-18 @ 20:30)    Vital Signs Last 24 Hrs  HR: 104 (06-03-18 @ 10:58) (84 - 120)  BP: 108/60 (06-03-18 @ 10:00) (67/33 - 108/60)  RR: 18 (06-03-18 @ 10:00)  SpO2: 100% (06-03-18 @ 10:58) (91% - 100%)  Wt(kg): --                          12.9   25.43 )-----------( 132      ( 03 Jun 2018 05:00 )             42.8       06-03    126<L>  |  88<L>  |  34<H>  ----------------------------<  312<H>  4.6   |  21<L>  |  5.38<H>    Ca    8.8      03 Jun 2018 05:00  Phos  2.7     06-03  Mg     2.2     06-03    TPro  8.1  /  Alb  2.8<L>  /  TBili  0.5  /  DBili  x   /  AST  20  /  ALT  16  /  AlkPhos  205<H>  06-03          MICROBIOLOGY:    blood culture  sent from the catheter, and peripheral source negative so far,       RADIOLOGY:  < from: Xray Chest 1 View- PORTABLE-Routine (06.02.18 @ 08:55) >  IMPRESSION:  Low lung volumes with diffusely increased lung markings again noted   related to chronic interstitial disease, however, component of   superimposed edema and/or infection cannot be excluded.    Persistent right pleural reaction. No gross left effusion.    No pneumothorax.    Stable left chest wall AICD and cardiac and prominent appearing cardiac   and mediastinal silhouettes.    Tunneled right chest wall dual-lumen dialysis catheter with tips in   cavoatrial region and left PICC with tip in SVC region again noted.    Previously seen distally inserted [place]-Chris catheter has since been   removed. No appreciable retained catheter elements.    < end of copied text > HPI:  62 y/o M PMHx significant for severe CHF, on chronic dobutamine gtt at home x 3 years (follows up with Dr. Davis), AFib on coumadin, AICD, ESRD on HD MWF (plus add'l session on Saturday prn), COPD (on home O2), pulmonary fibrosis, presents to the ED with chief complaint of epistaxis that started yesterday. Patient reports intermittent nose bleed that responded to direct pressure. However, bleeding would soon recur afterwards. He also reports chronic SOB with minimal exertion, such as transferring himself between chairs and minimal walking. He says this has been bothering him "all the time." No CP, cough or fever.   Pt was beiong managed in the ccuu and yesterdy ha he developed a fever and a high white count the reason for this is unknown at this point.    The pt has a AICD that was placed 5 years ago , AV fistula 4 years ago at [facility], and PermCath 2 years ago   Pt also had a swan placed and it was taken out yesterday  He also complains of mild abdominal pain and has tenderness to palpation    no other symptoms and none of the line sites bother him at this moment.           PAST MEDICAL & SURGICAL HISTORY:  Seizure: Off Keppra since 7/2017  Chronic hypotension  AF (atrial fibrillation)  COPD (chronic obstructive pulmonary disease): 4L home O2  HLD (hyperlipidemia)  DM (diabetes mellitus): Off Insulin since 7/2017  ESRD (end stage renal disease) on dialysis  BPH (benign prostatic hypertrophy)  Myocardial infarction: 10/2011  Gout  CHF (congestive heart failure)  AICD (automatic cardioverter/defibrillator) present: Biot[employer] - placed 9/11/09  H/O coronary angiogram      Allergies    No Known Allergies    Intolerances        ANTIMICROBIALS:  piperacillin/tazobactam IVPB. 3.375 every 12 hours      OTHER MEDS:  ALBUTerol/ipratropium for Nebulization 3 milliLiter(s) Nebulizer every 6 hours PRN  amiodarone    Tablet 100 milliGRAM(s) Oral daily  atorvastatin 80 milliGRAM(s) Oral at bedtime  bisacodyl 5 milliGRAM(s) Oral at bedtime  chlorhexidine 4% Liquid 1 Application(s) Topical <User Schedule>  DOBUTamine Infusion 2.5 MICROgram(s)/kG/Min IV Continuous <Continuous>  docusate sodium 100 milliGRAM(s) Oral two times a day  finasteride 5 milliGRAM(s) Oral daily  lactulose Syrup 10 Gram(s) Oral two times a day  levothyroxine 75 MICROGram(s) Oral daily  norepinephrine Infusion 0.05 MICROgram(s)/kG/Min IV Continuous <Continuous>  pantoprazole    Tablet 40 milliGRAM(s) Oral before breakfast  senna 2 Tablet(s) Oral at bedtime  sevelamer hydrochloride 1600 milliGRAM(s) Oral <User Schedule>  sodium chloride 0.65% Nasal 1 Spray(s) Both Nostrils four times a day      SOCIAL HISTORY:    Marital Status:  (   x)    (  ) Single    (   )    (  )   Occupation: retired  Lives with: (  ) alone  (  ) children   ( x ) spouse   (  ) parents  (  ) other    Substance Use (street drugs): (x  ) never used  (  ) other:  Tobacco Usage:  (x  ) never smoked   (   ) former smoker   (   ) current smoker  (     ) pack year  (        ) last cigarette date  Alcohol Usage:no  Social EtOH    FAMILY HISTORY:  No pertinent family history in first degree relatives      ROS:  Unobtainable because:   All other systems negative     Constitutional:  fever, no chills, no weight loss, no night sweats  HEENT:  no vision changes, no sore throat, no rhinorrhea  Cardiovascular:  no chest pain, no palpitation  Respiratory:  SOB, no cough  GI:  abd pain, no vomiting, no diarrhea  urinary: no dysuria, no hematuria, no flank pain  : no vaginal  discharge or bleeding  musculoskeletal:  no joint pain, no joint swelling  skin:  no rash  neurology:  no headache, no seizure, no change in mental status  psych: no anxiety, no depression     Physical Exam:    General:    NAD, non toxic, A&O x3, pt is sitting upright on bipap   HEENT:   no oropharyngeal lesions, no LAD, neck supple  Cardio:    regular S1,S2, no murmur  Respiratory:  crepts   abd:   soft, BS +, tenderness to palpation   :     no CVAT, no suprapubic tenderness,   Musculoskeletal : no joint swelling, no edema  Skin:    no rash  vascular: Picc line left upper exterity, AICD on the anterior chest wall, and swan groin the right groin that was taken out yesterday   Neurologic:     no focal deficits  psych: normal affect, no suicidal ideation      Drug Dosing Weight  Height (cm): 180.34 (27 May 2018 17:24)  Weight (kg): 71 (27 May 2018 17:24)  BMI (kg/m2): 21.8 (27 May 2018 17:24)  BSA (m2): 1.9 (27 May 2018 17:24)    Vital Signs Last 24 Hrs  T(F): 97.3 (06-03-18 @ 08:00), Max: 101.4 (06-02-18 @ 20:30)    Vital Signs Last 24 Hrs  HR: 104 (06-03-18 @ 10:58) (84 - 120)  BP: 108/60 (06-03-18 @ 10:00) (67/33 - 108/60)  RR: 18 (06-03-18 @ 10:00)  SpO2: 100% (06-03-18 @ 10:58) (91% - 100%)  Wt(kg): --                          12.9   25.43 )-----------( 132      ( 03 Jun 2018 05:00 )             42.8       06-03    126<L>  |  88<L>  |  34<H>  ----------------------------<  312<H>  4.6   |  21<L>  |  5.38<H>    Ca    8.8      03 Jun 2018 05:00  Phos  2.7     06-03  Mg     2.2     06-03    TPro  8.1  /  Alb  2.8<L>  /  TBili  0.5  /  DBili  x   /  AST  20  /  ALT  16  /  AlkPhos  205<H>  06-03          MICROBIOLOGY:    blood culture  sent from the catheter, and peripheral source negative so far,       RADIOLOGY:  < from: Xray Chest 1 View- PORTABLE-Routine (06.02.18 @ 08:55) >  IMPRESSION:  Low lung volumes with diffusely increased lung markings again noted   related to chronic interstitial disease, however, component of   superimposed edema and/or infection cannot be excluded.    Persistent right pleural reaction. No gross left effusion.    No pneumothorax.    Stable left chest wall AICD and cardiac and prominent appearing cardiac   and mediastinal silhouettes.    Tunneled right chest wall dual-lumen dialysis catheter with tips in   cavoatrial region and left PICC with tip in SVC region again noted.    Previously seen distally inserted [place]-Chris catheter has since been   removed. No appreciable retained catheter elements.    < end of copied text > cc: fever    HPI:  63M with severe CHF, on chronic dobutamine gtt at home x 3 years (follows up with Dr. Davis), AFib on coumadin, AICD, ESRD on HD MWF (plus add'l session on Saturday prn), COPD (on home O2), pulmonary fibrosis, presents to the ED with chief complaint of epistaxis that started yesterday. Patient reports intermittent nose bleed that responded to direct pressure. However, bleeding would soon recur afterwards. He also reports chronic SOB with minimal exertion, such as transferring himself between chairs and minimal walking.  Pt has been in the hospital being managed in the ccu for acute on chronic CHF.  Over the past couple of days, he has developed a worsening leukocytosis.  He had a swan-chris catheter that was just removed.  AVF placed 4 years ago is not functional.  R PICC has been in place for over a year.  Permcath that was placed 2 years ago.  New fever yesterday.  Today, notes RLQ pain.  Constipated as well.  BC drawn yesterday.  Another BC ordered but has not yet been drawn.  Empiric vancomycin x1 yesterday and zosyn started yesterday.    ID asked to help management.      PAST MEDICAL & SURGICAL HISTORY:  Seizure: Off Keppra since 7/2017  Chronic hypotension  AF (atrial fibrillation)  COPD (chronic obstructive pulmonary disease): 4L home O2  HLD (hyperlipidemia)  DM (diabetes mellitus): Off Insulin since 7/2017  ESRD (end stage renal disease) on dialysis  BPH (benign prostatic hypertrophy)  Myocardial infarction: 10/2011  Gout  CHF (congestive heart failure)  AICD (automatic cardioverter/defibrillator) present: Biotronic - placed 9/11/09  H/O coronary angiogram    Allergies  No Known Allergies    ANTIMICROBIALS:    vancomycin x1 6/2  piperacillin/tazobactam IVPB. 3.375 every 12 hours (6/2-)    MEDICATIONS  (STANDING):  amiodarone    Tablet 100 daily  atorvastatin 80 at bedtime  bisacodyl 5 at bedtime  DOBUTamine Infusion 2.5 <Continuous>  docusate sodium 100 two times a day  finasteride 5 daily  lactulose Syrup 10 two times a day  levothyroxine 75 daily  norepinephrine Infusion 0.05 <Continuous>  pantoprazole    Tablet 40 before breakfast  senna 2 at bedtime    SOCIAL HISTORY:  never smoked    FAMILY HISTORY:  heart disease    ROS:  All other systems negative except as noted below:  Constitutional:  fever, no chills, no weight loss, no night sweats  HEENT:  no vision changes, no sore throat, no rhinorrhea  Cardiovascular:  no chest pain, no palpitation  Respiratory:  SOB, no cough  GI:  abd pain, no vomiting, no diarrhea  urinary: no dysuria, no hematuria, no flank pain  : no vaginal  discharge or bleeding  musculoskeletal:  no joint pain, no joint swelling  skin:  no rash  neurology:  no headache, no seizure, no change in mental status  psych: no anxiety, no depression     Drug Dosing Weight  Height (cm): 180.34 (27 May 2018 17:24)  Weight (kg): 71 (27 May 2018 17:24)  BMI (kg/m2): 21.8 (27 May 2018 17:24)  BSA (m2): 1.9 (27 May 2018 17:24)    Vital Signs Last 24 Hrs  T(F): 97.3 (06-03-18 @ 08:00), Max: 101.4 (06-02-18 @ 20:30)    Vital Signs Last 24 Hrs  HR: 104 (06-03-18 @ 10:58) (84 - 120)  BP: 108/60 (06-03-18 @ 10:00) (67/33 - 108/60)  RR: 18 (06-03-18 @ 10:00)  SpO2: 100% (06-03-18 @ 10:58) (91% - 100%)  Wt(kg): --    Physical Exam:  General:    NAD, non toxic, A&O x3, pt is sitting upright on bipap   HEENT:   no oropharyngeal lesions, no LAD, neck supple  Cardio:    regular S1,S2, no murmur  Respiratory:  crepts   abd:   soft, BS +, tenderness to palpation   :     no CVAT, no suprapubic tenderness,   Musculoskeletal : no joint swelling, no edema  Skin:    no rash  vascular: ICD on the anterior chest wall, and swan groin the right groin that was taken out yesterday ; R permacath c/d/i; L PICC c/d/i  Neurologic:     no focal deficits  psych: normal affect                       12.9   25.43 )-----------( 132      ( 03 Jun 2018 05:00 )             42.8     126<L>  |  88<L>  |  34<H>  ----------------------------<  312<H>  4.6   |  21<L>  |  5.38<H>    Ca    8.8      03 Jun 2018 05:00  Phos  2.7     06-03  Mg     2.2     06-03    TPro  8.1  /  Alb  2.8<L>  /  TBili  0.5  /  DBili  x   /  AST  20  /  ALT  16  /  AlkPhos  205<H>  06-03    MICROBIOLOGY:  blood culture  sent from the catheter, and peripheral source negative so far    RADIOLOGY:  Xray Chest 1 View- PORTABLE-Routine (06.02.18 @ 08:55) >  IMPRESSION:  low lung volumes with diffusely increased lung markings again noted related to chronic interstitial disease, however, component of superimposed edema and/or infection cannot be excluded.  Persistent right pleural reaction. No gross left effusion.    No pneumothorax.  Stable left chest wall AICD and cardiac and prominent appearing cardiac and mediastinal silhouettes.  Tunneled right chest wall dual-lumen dialysis catheter with tips in cavoatrial region and left PICC with tip in SVC region again noted.  Previously seen distally inserted [place] catheter has since been removed. No appreciable retained catheter elements.

## 2018-06-03 NOTE — PROGRESS NOTE ADULT - SUBJECTIVE AND OBJECTIVE BOX
HPI/INTERVAL HISTORY:  Patient seen and examined at bedside.    OBJECTIVE:  VITAL SIGNS:  ICU Vital Signs Last 24 Hrs  T(C): 38.2 (03 Jun 2018 04:00), Max: 38.6 (02 Jun 2018 20:30)  T(F): 100.8 (03 Jun 2018 04:00), Max: 101.4 (02 Jun 2018 20:30)  HR: 95 (03 Jun 2018 07:20) (84 - 120)  BP: 98/53 (03 Jun 2018 07:00) (67/33 - 105/56)  BP(mean): 59 (03 Jun 2018 07:00) (39 - 70)  ABP: --  ABP(mean): --  RR: 14 (03 Jun 2018 07:00) (13 - 26)  SpO2: 100% (03 Jun 2018 07:20) (91% - 100%)        06-02 @ 07:01  -  06-03 @ 07:00  --------------------------------------------------------  IN: 1860.3 mL / OUT: 1194 mL / NET: 666.3 mL      CAPILLARY BLOOD GLUCOSE      POCT Blood Glucose.: 294 mg/dL (03 Jun 2018 03:40)      Exam (as of this AM):  GEN fatigued-appearing, A&Ox3  RESP diffuse crackles, similar to prior  CV irreg irreg, s1 s2  ABD soft, R-sided tenderness w guarding, no rebound tenderness  EXT thighs w 1+ pitting edema, DP pulses palpable b/l    LABS:                        12.9   25.43 )-----------( 132      ( 03 Jun 2018 05:00 )             42.8     06-03    126<L>  |  88<L>  |  34<H>  ----------------------------<  312<H>  4.6   |  21<L>  |  5.38<H>    Ca    8.8      03 Jun 2018 05:00  Phos  2.7     06-03  Mg     2.2     06-03    TPro  8.1  /  Alb  2.8<L>  /  TBili  0.5  /  DBili  x   /  AST  20  /  ALT  16  /  AlkPhos  205<H>  06-03    LIVER FUNCTIONS - ( 03 Jun 2018 05:00 )  Alb: 2.8 g/dL / Pro: 8.1 g/dL / ALK PHOS: 205 u/L / ALT: 16 u/L / AST: 20 u/L / GGT: x           PT/INR - ( 03 Jun 2018 05:00 )   PT: 71.0 SEC;   INR: 6.16                    RADIOLOGY & ADDITIONAL TESTS: Reviewed.    ALBUTerol/ipratropium for Nebulization 3 milliLiter(s) Nebulizer every 6 hours PRN  amiodarone    Tablet 100 milliGRAM(s) Oral daily  atorvastatin 80 milliGRAM(s) Oral at bedtime  bisacodyl 5 milliGRAM(s) Oral at bedtime  chlorhexidine 4% Liquid 1 Application(s) Topical <User Schedule>  DOBUTamine Infusion 5 MICROgram(s)/kG/Min IV Continuous <Continuous>  docusate sodium 100 milliGRAM(s) Oral two times a day  finasteride 5 milliGRAM(s) Oral daily  lactulose Syrup 10 Gram(s) Oral two times a day  levothyroxine 75 MICROGram(s) Oral daily  norepinephrine Infusion 0.05 MICROgram(s)/kG/Min IV Continuous <Continuous>  pantoprazole    Tablet 40 milliGRAM(s) Oral before breakfast  piperacillin/tazobactam IVPB. 3.375 Gram(s) IV Intermittent every 12 hours  senna 2 Tablet(s) Oral at bedtime  sevelamer hydrochloride 1600 milliGRAM(s) Oral <User Schedule>  sodium chloride 0.65% Nasal 1 Spray(s) Both Nostrils four times a day      No Known Allergies HPI/INTERVAL HISTORY:  Patient seen and examined at bedside.    Hypotensive overnight to the 60s, s/p 250 cc bolus with minimal improvement. Febrile overnight to 101.4. Started on norepinephrine, currently titrated to 0.375 mcg /hr and mapping 60s. Patient is responsive but hypersomnolent, currently tolerating bipap. abdominal xray without obstructive bowel gas pattern or free air. Blood cultures were repeated prior to starting broad spectrum abx (vancomycin by level, zosyn 3.375 bid.)    Currenly hypersomnolent pointing to his stomach complaining of pain, states his last bowel movement was Saturday. Sister at bedside, expressed to both of them realistic prognosis in the short term and long term, especially if found to concurrently have infection. Night team readdressed GOC with patient and he wishes to remain full code despite poor prognosis.    OBJECTIVE:  VITAL SIGNS:  ICU Vital Signs Last 24 Hrs  T(C): 38.2 (03 Jun 2018 04:00), Max: 38.6 (02 Jun 2018 20:30)  T(F): 100.8 (03 Jun 2018 04:00), Max: 101.4 (02 Jun 2018 20:30)  HR: 95 (03 Jun 2018 07:20) (84 - 120)  BP: 98/53 (03 Jun 2018 07:00) (67/33 - 105/56)  BP(mean): 59 (03 Jun 2018 07:00) (39 - 70)  ABP: --  ABP(mean): --  RR: 14 (03 Jun 2018 07:00) (13 - 26)  SpO2: 100% (03 Jun 2018 07:20) (91% - 100%)        06-02 @ 07:01  -  06-03 @ 07:00  --------------------------------------------------------  IN: 1860.3 mL / OUT: 1194 mL / NET: 666.3 mL      CAPILLARY BLOOD GLUCOSE      POCT Blood Glucose.: 294 mg/dL (03 Jun 2018 03:40)      Exam (as of this AM):  GEN hypersomnolent, lethargic, unable to assess orientation.  RESP diffuse crackles, similar to prior  CV irreg irreg, s1 s2  ABD soft, R-sided tenderness w guarding, no rebound tenderness  EXT thighs w 1+ pitting edema, DP pulses palpable b/l    LABS:                        12.9   25.43 )-----------( 132      ( 03 Jun 2018 05:00 )             42.8     06-03    126<L>  |  88<L>  |  34<H>  ----------------------------<  312<H>  4.6   |  21<L>  |  5.38<H>    Ca    8.8      03 Jun 2018 05:00  Phos  2.7     06-03  Mg     2.2     06-03    TPro  8.1  /  Alb  2.8<L>  /  TBili  0.5  /  DBili  x   /  AST  20  /  ALT  16  /  AlkPhos  205<H>  06-03    LIVER FUNCTIONS - ( 03 Jun 2018 05:00 )  Alb: 2.8 g/dL / Pro: 8.1 g/dL / ALK PHOS: 205 u/L / ALT: 16 u/L / AST: 20 u/L / GGT: x           PT/INR - ( 03 Jun 2018 05:00 )   PT: 71.0 SEC;   INR: 6.16                    RADIOLOGY & ADDITIONAL TESTS: Reviewed.    ALBUTerol/ipratropium for Nebulization 3 milliLiter(s) Nebulizer every 6 hours PRN  amiodarone    Tablet 100 milliGRAM(s) Oral daily  atorvastatin 80 milliGRAM(s) Oral at bedtime  bisacodyl 5 milliGRAM(s) Oral at bedtime  chlorhexidine 4% Liquid 1 Application(s) Topical <User Schedule>  DOBUTamine Infusion 5 MICROgram(s)/kG/Min IV Continuous <Continuous>  docusate sodium 100 milliGRAM(s) Oral two times a day  finasteride 5 milliGRAM(s) Oral daily  lactulose Syrup 10 Gram(s) Oral two times a day  levothyroxine 75 MICROGram(s) Oral daily  norepinephrine Infusion 0.05 MICROgram(s)/kG/Min IV Continuous <Continuous>  pantoprazole    Tablet 40 milliGRAM(s) Oral before breakfast  piperacillin/tazobactam IVPB. 3.375 Gram(s) IV Intermittent every 12 hours  senna 2 Tablet(s) Oral at bedtime  sevelamer hydrochloride 1600 milliGRAM(s) Oral <User Schedule>  sodium chloride 0.65% Nasal 1 Spray(s) Both Nostrils four times a day      No Known Allergies

## 2018-06-03 NOTE — PROGRESS NOTE ADULT - ASSESSMENT
63 year old man with ESRD (HD MWF), NICM (EF 21%) s/p ICD, PICC line in place with home dobutamine drip, atrial fibrillation on coumadin, COPD on home O2 (4-5L), pulmonary fibrosis with increasing shortness of breath and severe epistaxis.  No more epistaxis.  Continue dobutamine. Would strongly advise against down titration attempts as in the past this was not well tolerated.  Consider restoration of outpatient dose without titration.   Echo results unchanged from previous study.  EF is 15%. NYHA Class IV ACC AHA Stage D  On Midodrine  Unable to attempt GDMT (ACE/ARB/ARNI/BB) given hypotension  Severe ILD precludes ability to implant LVAD  HD per renal  BIPAP per Pulmonary  Mobilizing to chair  Appreciate CCU care and multiple consultants follow up.  Continue present care

## 2018-06-03 NOTE — CHART NOTE - NSCHARTNOTEFT_GEN_A_CORE
Patient refusing Bipap. Explained to patient it's necessity and consequences of refusal.  Patient verbalized understanding but continues to refuse.  Will continue to monitor blood gas and mental status. Patient refusing Bipap. Explained to patient it's necessity and consequences of refusal.  Patient verbalized understanding but continues to refuse.  Patient remains full code, will continue to monitor blood gas and mental status.

## 2018-06-03 NOTE — PROGRESS NOTE ADULT - PROBLEM SELECTOR PLAN 1
secondary to a combination of ILD as well as chf: ? Pt is using cpap/ bipap at home: The compliance report is given to the resident on floor today: Pt has not been complaint with machine at home: He used it only for 6 days and that too, for less then 2 hours. Called his wife to get more information, no answer: Left message to call my office. Pt was discharged on bipap last time : Can restart bipap at 12/5 with 40% fio2 in the night while sleeping and prn in the daytime:  : doig same: feels a shade better: can start bipap as mentioned above at night time while sleepin/1: his SOB ismoslty related to his poor LV function: He has ILD , and he has harris tried on steroids before with no improvement in his symptoms and hence they were dced: This was confirmed again with the wife: He was again tried on steroids on last admission with no significant improvement in his breathing: Further his symptoms improve with better management of  his chf / fluid overload status: At this time would cont current treatment without steroids: He can cont on bipap at night and his compliance at home is pretty poor: Explained to him again as well as his sister and wife that he need to be complaint with his bipap machine at home: They understand!   SOB (+), not tachypneic.  on nasal cannular used bipap last night. on Dobutamine drip. leukocytosis (+) slightly elevated temp. Repeat CXR  6/3 no SOB, no tachypnea. on bipap for non compensated respiratory acidosis. 7.27/54/125/22. current bipap order is 12/5 40%. recommend titrate FIO2 to keep O2 sat between 88 to 92%.

## 2018-06-03 NOTE — PROGRESS NOTE ADULT - ASSESSMENT
63yM w severe HFrEF (on dobutamine gtt x3 yrs) w [employer] AICD, A-fib (on warfarin), ESRD on HD MWF (+Sat PRN), pulmonary fibrosis (on home O2), on  p/w epistaxis x1 day, also w acute-on-chronic SOB, found to be volume-overloaded despite reported med compliance, no dietary changes, and having gotten extra session of HD the day prior to admission. SBP 80s-90s in UEs, so in order to be monitored while undergoing fluid removal by HD/UF, was transferred to CCU.     Since admission, dyspnea improved.  gtt has been adjusted from 7.5 as low as 2.5, based on RHC measurements ([place]-Chris in place -). Course c/b abdominal pain and nausea, as well as rising WBC.     # Neuro - no active issues    # ENT - epistaxis, resolved; attachment for home humidification of nasal cannula sent to patient's home  --- continue humidification of nasal cannula, nasal saline spray  --- facilitate patient's getting simple mask to use for supplemental O2 at home    # Cardiac  - Severe bi-ventricular systolic heart failure w AICD in place, on chronic dobutamine gtt 7.5, LVEF 15% this admission.    --- In light of pt's lethargy/malaise, abd pain, and rising WBC, incr  gtt from 2.5 back to 5   --- remove femoral introducer (had been left in place even after [place]-Chris removed)  --- Give midodrine 10mg one hour before HD, not otherwise   --- appreciate Heart-Failure recs  --- daily weights: standing weights if possible  - Atrial fibrillation, on home warfarin 3mg QD  --- cont amiodarone 100mg QD  --- check INR QD, dose warfarin QD  ------- INR 6/2 >4, so no warfarin ordered  - Hyperlipidemia --- cont atorvastatin 80 daily  - BP discrepancy between UEs vs LEs  --- BP in lower extremities is significantly higher than in upper extremities, consistent w prior CCU admission; we think that LE BP is likely more accurate than UE BP. F/u [facility] duplex of UEs did not reveal any significant arterial stenosis.   - MACE risk reduction --- cont ASA  - Concern for L to R shunt on old TTE  --- discuss/review    # Pulm  - Hypoxia, likely multifactorial --- c/w nasal cannula to keep SpO2 >90%  - Pulmonary fibrosis  --- per pulm, do not anticipate benefit from steroids based on unsuccessful trial in past  - ALONZO --- BiLevel at night and PRN    # GI  - Abdominal pain, R-sided, achy, associated w tenderness to palpation  --- onset coincides w weaning of dobutamine, but thought not likely due to hypoperfusion given that CO and CI still adequate  --- cont newly started PPI QD  --- order simethicone if needed for gas  --- order acetaminophen if needed for pain  --- avoiding Maalox and other aluminum-hydroxide-containing products due to ESRD   - Nausea and vomiting  --- order ondansetron and/or metoclopramide if needed   --- f/u Xray of abdomen  - Constipation --- cont senna, bisacodyl, docusate; add'l agents PRN    #  - BPH   --- cont finasteride  --- clarify whether pt needs finasteride given that he is anuric    # Renal - ESRD on HD  --- C/w HD qMWF, as well as Saturday prn and add'l fluid-removal PRN  --- C/w Carrie-Tiffanie, Sevelamer  --- appreciate nephrology recs    # Endocr  - Diabetes mellitus type 2  --- off insulin for years  --- monitor glucose on BMP and blood gases  - Hypothyroidism  --- cont levothyroxine    # Infectious Disease   - Leukocytosis, oral temperature afebrile   --- check lactate  --- check BCx x2  --- low threshold to start antibiotics  - HBV vaccination not up to date --- consider HBV vaccination this admission    # VTE ppx: on warfarin  # Dispo: CCU  # GOC: full code, discussed multiple times this admission and in past, including with palliative care    Good Garza MD  [facility] Internal Medicine   Pager # [phone]/ [phone] 63yM w severe HFrEF (on dobutamine gtt x3 yrs) w [employer] AICD, A-fib (on warfarin), ESRD on HD MWF (+Sat PRN), pulmonary fibrosis (on home O2), on  p/w epistaxis x1 day, also w acute-on-chronic SOB, found to be volume-overloaded despite reported med compliance, no dietary changes, and having gotten extra session of HD the day prior to admission. SBP 80s-90s in UEs, so in order to be monitored while undergoing fluid removal by HD/UF, was transferred to CCU.     Since admission, dyspnea improved.  gtt has been adjusted from 7.5 as low as 2.5, based on RHC measurements ([place]-Chris in place -). Course c/b abdominal pain and nausea, as well as rising WBC.     # Neuro - no active issues    # ENT - epistaxis, resolved; attachment for home humidification of nasal cannula sent to patient's home  --- continue humidification of nasal cannula, nasal saline spray  --- facilitate patient's getting simple mask to use for supplemental O2 at home    # Cardiac  - Severe bi-ventricular systolic heart failure w AICD in place, on chronic dobutamine gtt 7.5, LVEF 15% this admission.    --- No improvement in symptoms with dobutamine at 5 mcg overnight, decrease to 2.5.  --- remove femoral introducer (had been left in place even after [place]-Chris removed)  --- Give midodrine 10mg one hour before HD, not otherwise   --- appreciate Heart-Failure recs  --- daily weights: standing weights if possible  - Atrial fibrillation, on home warfarin 3mg QD  --- cont amiodarone 100mg QD  --- check INR QD, dose warfarin QD  ------- INR 6/3 supratherapeutic to >6 in the setting of no bleeding. Hold warfarin, no reversal unless bleeding.  - Hyperlipidemia --- cont atorvastatin 80 daily  - BP discrepancy between UEs vs LEs  --- BP in lower extremities is significantly higher than in upper extremities, consistent w prior CCU admission; we think that LE BP is likely more accurate than UE BP. F/u VA duplex of UEs did not reveal any significant arterial stenosis.   - MACE risk reduction --- cont ASA  - Concern for L to R shunt on old TTE  - New onset sepsis c/b hypotension requiring levophed, unresponsive to IVF bolus.     # Pulm  - Hypoxia, likely multifactorial --- c/w nasal cannula to keep SpO2 >90%  - Pulmonary fibrosis  --- per pulm, do not anticipate benefit from steroids based on unsuccessful trial in past  - ALONZO --- BiLevel at night and PRN, patient minimally compliant with worsening mixed respiratory and metabolic acidosis.    # GI  - Abdominal pain, R-sided, achy, associated w tenderness to palpation  --- onset coincides w weaning of dobutamine, but thought not likely due to hypoperfusion given that CO and CI still adequate,   --- cont newly started PPI QD  --- order simethicone if needed for gas  --- order acetaminophen if needed for pain  --- avoiding Maalox and other aluminum-hydroxide-containing products due to ESRD   - Nausea and vomiting  --- order ondansetron and/or metoclopramide if needed   --- abdxray with non-obstructive gas pattern, no free air.  - Constipation --- cont senna, bisacodyl, docusate; add'l agents PRN    #  - BPH   --- cont finasteride  --- clarify whether pt needs finasteride given that he is anuric    # Renal - ESRD on HD  --- C/w HD qMWF, as well as Saturday prn and add'l fluid-removal PRN  --- C/w Carrie-Tiffanie, Sevelamer  --- appreciate nephrology recs  --- may need removal of tunneled catheter and chronic PICC in the setting of sepsis.    # Endocr  - Diabetes mellitus type 2  --- off insulin for years  --- monitor glucose on BMP and blood gases  - Hypothyroidism  --- cont levothyroxine    # Infectious Disease   - Leukocytosis worsening in the setting of rectal temperature O/N to 101.4.  - 2 x peripheral blood cultures pending. Vancomycin by level. Zosyn dosed renally.  - ID consult pending in the setting of chronic PICC and right sided tunneled cath for dialysis.  - HBV vaccination not up to date --- consider HBV vaccination this admission    # VTE ppx: warfarin held in the setting of supra therapeutic INR.   # Dispo: CCU  # GOC: full code, discussed multiple times this admission and in past, including with palliative care    Good Garza MD  [facility] Internal Medicine   Pager # [phone]/ [phone]

## 2018-06-04 LAB
ALBUMIN SERPL ELPH-MCNC: 2.7 G/DL — LOW (ref 3.3–5)
ALBUMIN SERPL ELPH-MCNC: 2.7 G/DL — LOW (ref 3.3–5)
ALP SERPL-CCNC: 194 U/L — HIGH (ref 40–120)
ALP SERPL-CCNC: 213 U/L — HIGH (ref 40–120)
ALT FLD-CCNC: 15 U/L — SIGNIFICANT CHANGE UP (ref 4–41)
ALT FLD-CCNC: 18 U/L — SIGNIFICANT CHANGE UP (ref 4–41)
AMYLASE P1 CFR SERPL: 73 U/L — SIGNIFICANT CHANGE UP (ref 25–125)
APTT BLD: 68.7 SEC — HIGH (ref 27.5–37.4)
APTT BLD: 72.6 SEC — HIGH (ref 27.5–37.4)
AST SERPL-CCNC: 15 U/L — SIGNIFICANT CHANGE UP (ref 4–40)
AST SERPL-CCNC: 20 U/L — SIGNIFICANT CHANGE UP (ref 4–40)
BASE EXCESS BLDA CALC-SCNC: -2 MMOL/L — SIGNIFICANT CHANGE UP
BASE EXCESS BLDA CALC-SCNC: -4.4 MMOL/L — SIGNIFICANT CHANGE UP
BASE EXCESS BLDV CALC-SCNC: -2.9 MMOL/L — SIGNIFICANT CHANGE UP
BASOPHILS # BLD AUTO: 0.03 K/UL — SIGNIFICANT CHANGE UP (ref 0–0.2)
BASOPHILS # BLD AUTO: 0.04 K/UL — SIGNIFICANT CHANGE UP (ref 0–0.2)
BASOPHILS NFR BLD AUTO: 0.2 % — SIGNIFICANT CHANGE UP (ref 0–2)
BASOPHILS NFR BLD AUTO: 0.3 % — SIGNIFICANT CHANGE UP (ref 0–2)
BILIRUB SERPL-MCNC: 0.5 MG/DL — SIGNIFICANT CHANGE UP (ref 0.2–1.2)
BILIRUB SERPL-MCNC: 0.6 MG/DL — SIGNIFICANT CHANGE UP (ref 0.2–1.2)
BUN SERPL-MCNC: 26 MG/DL — HIGH (ref 7–23)
BUN SERPL-MCNC: 45 MG/DL — HIGH (ref 7–23)
CALCIUM SERPL-MCNC: 8.6 MG/DL — SIGNIFICANT CHANGE UP (ref 8.4–10.5)
CALCIUM SERPL-MCNC: 8.8 MG/DL — SIGNIFICANT CHANGE UP (ref 8.4–10.5)
CHLORIDE SERPL-SCNC: 88 MMOL/L — LOW (ref 98–107)
CHLORIDE SERPL-SCNC: 89 MMOL/L — LOW (ref 98–107)
CO2 SERPL-SCNC: 20 MMOL/L — LOW (ref 22–31)
CO2 SERPL-SCNC: 23 MMOL/L — SIGNIFICANT CHANGE UP (ref 22–31)
CREAT SERPL-MCNC: 4.06 MG/DL — HIGH (ref 0.5–1.3)
CREAT SERPL-MCNC: 6.49 MG/DL — HIGH (ref 0.5–1.3)
EOSINOPHIL # BLD AUTO: 0.07 K/UL — SIGNIFICANT CHANGE UP (ref 0–0.5)
EOSINOPHIL # BLD AUTO: 0.09 K/UL — SIGNIFICANT CHANGE UP (ref 0–0.5)
EOSINOPHIL NFR BLD AUTO: 0.5 % — SIGNIFICANT CHANGE UP (ref 0–6)
EOSINOPHIL NFR BLD AUTO: 0.6 % — SIGNIFICANT CHANGE UP (ref 0–6)
GLUCOSE SERPL-MCNC: 223 MG/DL — HIGH (ref 70–99)
GLUCOSE SERPL-MCNC: 359 MG/DL — HIGH (ref 70–99)
HCO3 BLDA-SCNC: 20 MMOL/L — LOW (ref 22–26)
HCO3 BLDA-SCNC: 22 MMOL/L — SIGNIFICANT CHANGE UP (ref 22–26)
HCO3 BLDV-SCNC: 20 MMOL/L — SIGNIFICANT CHANGE UP (ref 20–27)
HCT VFR BLD CALC: 39.7 % — SIGNIFICANT CHANGE UP (ref 39–50)
HCT VFR BLD CALC: 39.7 % — SIGNIFICANT CHANGE UP (ref 39–50)
HGB BLD-MCNC: 12.3 G/DL — LOW (ref 13–17)
HGB BLD-MCNC: 12.7 G/DL — LOW (ref 13–17)
IMM GRANULOCYTES # BLD AUTO: 0.05 # — SIGNIFICANT CHANGE UP
IMM GRANULOCYTES # BLD AUTO: 0.08 # — SIGNIFICANT CHANGE UP
IMM GRANULOCYTES NFR BLD AUTO: 0.3 % — SIGNIFICANT CHANGE UP (ref 0–1.5)
IMM GRANULOCYTES NFR BLD AUTO: 0.5 % — SIGNIFICANT CHANGE UP (ref 0–1.5)
INR BLD: 6.53 — CRITICAL HIGH (ref 0.88–1.17)
INR BLD: 6.85 — CRITICAL HIGH (ref 0.88–1.17)
LACTATE BLDA-SCNC: 1.3 MMOL/L — SIGNIFICANT CHANGE UP (ref 0.5–2)
LACTATE BLDV-MCNC: 1.3 MMOL/L — SIGNIFICANT CHANGE UP (ref 0.5–2)
LACTATE SERPL-SCNC: 0.7 MMOL/L — SIGNIFICANT CHANGE UP (ref 0.5–2)
LIDOCAIN IGE QN: 50 U/L — SIGNIFICANT CHANGE UP (ref 7–60)
LYMPHOCYTES # BLD AUTO: 1.01 K/UL — SIGNIFICANT CHANGE UP (ref 1–3.3)
LYMPHOCYTES # BLD AUTO: 1.2 K/UL — SIGNIFICANT CHANGE UP (ref 1–3.3)
LYMPHOCYTES # BLD AUTO: 6.3 % — LOW (ref 13–44)
LYMPHOCYTES # BLD AUTO: 8 % — LOW (ref 13–44)
MAGNESIUM SERPL-MCNC: 2.1 MG/DL — SIGNIFICANT CHANGE UP (ref 1.6–2.6)
MAGNESIUM SERPL-MCNC: 2.3 MG/DL — SIGNIFICANT CHANGE UP (ref 1.6–2.6)
MCHC RBC-ENTMCNC: 30.9 PG — SIGNIFICANT CHANGE UP (ref 27–34)
MCHC RBC-ENTMCNC: 31 % — LOW (ref 32–36)
MCHC RBC-ENTMCNC: 31.1 PG — SIGNIFICANT CHANGE UP (ref 27–34)
MCHC RBC-ENTMCNC: 32 % — SIGNIFICANT CHANGE UP (ref 32–36)
MCV RBC AUTO: 97.1 FL — SIGNIFICANT CHANGE UP (ref 80–100)
MCV RBC AUTO: 99.7 FL — SIGNIFICANT CHANGE UP (ref 80–100)
MONOCYTES # BLD AUTO: 1.69 K/UL — HIGH (ref 0–0.9)
MONOCYTES # BLD AUTO: 2.09 K/UL — HIGH (ref 0–0.9)
MONOCYTES NFR BLD AUTO: 11.2 % — SIGNIFICANT CHANGE UP (ref 2–14)
MONOCYTES NFR BLD AUTO: 13 % — SIGNIFICANT CHANGE UP (ref 2–14)
NEUTROPHILS # BLD AUTO: 11.99 K/UL — HIGH (ref 1.8–7.4)
NEUTROPHILS # BLD AUTO: 12.75 K/UL — HIGH (ref 1.8–7.4)
NEUTROPHILS NFR BLD AUTO: 79.5 % — HIGH (ref 43–77)
NEUTROPHILS NFR BLD AUTO: 79.6 % — HIGH (ref 43–77)
NRBC # FLD: 0 — SIGNIFICANT CHANGE UP
NRBC # FLD: 0 — SIGNIFICANT CHANGE UP
PCO2 BLDA: 45 MMHG — SIGNIFICANT CHANGE UP (ref 35–48)
PCO2 BLDA: 45 MMHG — SIGNIFICANT CHANGE UP (ref 35–48)
PCO2 BLDV: 60 MMHG — HIGH (ref 41–51)
PH BLDA: 7.3 PH — LOW (ref 7.35–7.45)
PH BLDA: 7.33 PH — LOW (ref 7.35–7.45)
PH BLDV: 7.22 PH — LOW (ref 7.32–7.43)
PHOSPHATE SERPL-MCNC: 2.4 MG/DL — LOW (ref 2.5–4.5)
PHOSPHATE SERPL-MCNC: 2.9 MG/DL — SIGNIFICANT CHANGE UP (ref 2.5–4.5)
PLATELET # BLD AUTO: 148 K/UL — LOW (ref 150–400)
PLATELET # BLD AUTO: 152 K/UL — SIGNIFICANT CHANGE UP (ref 150–400)
PMV BLD: 11.4 FL — SIGNIFICANT CHANGE UP (ref 7–13)
PMV BLD: 11.8 FL — SIGNIFICANT CHANGE UP (ref 7–13)
PO2 BLDA: 117 MMHG — HIGH (ref 83–108)
PO2 BLDA: 123 MMHG — HIGH (ref 83–108)
PO2 BLDV: 45 MMHG — HIGH (ref 35–40)
POTASSIUM SERPL-MCNC: 4.2 MMOL/L — SIGNIFICANT CHANGE UP (ref 3.5–5.3)
POTASSIUM SERPL-MCNC: 5.2 MMOL/L — SIGNIFICANT CHANGE UP (ref 3.5–5.3)
POTASSIUM SERPL-SCNC: 4.2 MMOL/L — SIGNIFICANT CHANGE UP (ref 3.5–5.3)
POTASSIUM SERPL-SCNC: 5.2 MMOL/L — SIGNIFICANT CHANGE UP (ref 3.5–5.3)
PROT SERPL-MCNC: 7.7 G/DL — SIGNIFICANT CHANGE UP (ref 6–8.3)
PROT SERPL-MCNC: 7.8 G/DL — SIGNIFICANT CHANGE UP (ref 6–8.3)
PROTHROM AB SERPL-ACNC: 78 SEC — HIGH (ref 9.8–13.1)
PROTHROM AB SERPL-ACNC: 81.9 SEC — HIGH (ref 9.8–13.1)
RBC # BLD: 3.98 M/UL — LOW (ref 4.2–5.8)
RBC # BLD: 4.09 M/UL — LOW (ref 4.2–5.8)
RBC # FLD: 15.1 % — HIGH (ref 10.3–14.5)
RBC # FLD: 15.2 % — HIGH (ref 10.3–14.5)
SAO2 % BLDA: 97.6 % — SIGNIFICANT CHANGE UP (ref 95–99)
SAO2 % BLDA: 98 % — SIGNIFICANT CHANGE UP (ref 95–99)
SAO2 % BLDV: 72.5 % — SIGNIFICANT CHANGE UP (ref 60–85)
SODIUM SERPL-SCNC: 126 MMOL/L — LOW (ref 135–145)
SODIUM SERPL-SCNC: 127 MMOL/L — LOW (ref 135–145)
VANCOMYCIN FLD-MCNC: 16.1 UG/ML — SIGNIFICANT CHANGE UP
WBC # BLD: 15.07 K/UL — HIGH (ref 3.8–10.5)
WBC # BLD: 16.02 K/UL — HIGH (ref 3.8–10.5)
WBC # FLD AUTO: 15.07 K/UL — HIGH (ref 3.8–10.5)
WBC # FLD AUTO: 16.02 K/UL — HIGH (ref 3.8–10.5)

## 2018-06-04 PROCEDURE — 99233 SBSQ HOSP IP/OBS HIGH 50: CPT

## 2018-06-04 PROCEDURE — 99232 SBSQ HOSP IP/OBS MODERATE 35: CPT

## 2018-06-04 PROCEDURE — 71045 X-RAY EXAM CHEST 1 VIEW: CPT | Mod: 26

## 2018-06-04 PROCEDURE — 76705 ECHO EXAM OF ABDOMEN: CPT | Mod: 26

## 2018-06-04 RX ORDER — VANCOMYCIN HCL 1 G
1250 VIAL (EA) INTRAVENOUS ONCE
Qty: 0 | Refills: 0 | Status: COMPLETED | OUTPATIENT
Start: 2018-06-04 | End: 2018-06-04

## 2018-06-04 RX ORDER — NOREPINEPHRINE BITARTRATE/D5W 8 MG/250ML
0.05 PLASTIC BAG, INJECTION (ML) INTRAVENOUS
Qty: 16 | Refills: 0 | Status: DISCONTINUED | OUTPATIENT
Start: 2018-06-04 | End: 2018-06-05

## 2018-06-04 RX ORDER — DOBUTAMINE HCL 250MG/20ML
7.5 VIAL (ML) INTRAVENOUS
Qty: 500 | Refills: 0 | Status: DISCONTINUED | OUTPATIENT
Start: 2018-06-04 | End: 2018-06-09

## 2018-06-04 RX ORDER — INSULIN LISPRO 100/ML
6 VIAL (ML) SUBCUTANEOUS ONCE
Qty: 0 | Refills: 0 | Status: COMPLETED | OUTPATIENT
Start: 2018-06-04 | End: 2018-06-04

## 2018-06-04 RX ORDER — VASOPRESSIN 20 [USP'U]/ML
0.02 INJECTION INTRAVENOUS
Qty: 100 | Refills: 0 | Status: DISCONTINUED | OUTPATIENT
Start: 2018-06-04 | End: 2018-06-07

## 2018-06-04 RX ADMIN — LACTULOSE 10 GRAM(S): 10 SOLUTION ORAL at 06:27

## 2018-06-04 RX ADMIN — Medication 75 MICROGRAM(S): at 06:27

## 2018-06-04 RX ADMIN — Medication 1 SPRAY(S): at 23:04

## 2018-06-04 RX ADMIN — Medication 3.33 MICROGRAM(S)/KG/MIN: at 14:25

## 2018-06-04 RX ADMIN — Medication 1 SPRAY(S): at 12:41

## 2018-06-04 RX ADMIN — Medication 1 SPRAY(S): at 16:52

## 2018-06-04 RX ADMIN — Medication 6 UNIT(S): at 23:02

## 2018-06-04 RX ADMIN — ATORVASTATIN CALCIUM 80 MILLIGRAM(S): 80 TABLET, FILM COATED ORAL at 22:15

## 2018-06-04 RX ADMIN — PIPERACILLIN AND TAZOBACTAM 25 GRAM(S): 4; .5 INJECTION, POWDER, LYOPHILIZED, FOR SOLUTION INTRAVENOUS at 17:02

## 2018-06-04 RX ADMIN — AMIODARONE HYDROCHLORIDE 100 MILLIGRAM(S): 400 TABLET ORAL at 06:26

## 2018-06-04 RX ADMIN — Medication 10.65 MICROGRAM(S)/KG/MIN: at 17:01

## 2018-06-04 RX ADMIN — PIPERACILLIN AND TAZOBACTAM 25 GRAM(S): 4; .5 INJECTION, POWDER, LYOPHILIZED, FOR SOLUTION INTRAVENOUS at 06:27

## 2018-06-04 RX ADMIN — Medication 5 MILLIGRAM(S): at 22:17

## 2018-06-04 RX ADMIN — Medication 3.33 MICROGRAM(S)/KG/MIN: at 17:16

## 2018-06-04 RX ADMIN — SEVELAMER CARBONATE 1600 MILLIGRAM(S): 2400 POWDER, FOR SUSPENSION ORAL at 12:40

## 2018-06-04 RX ADMIN — PANTOPRAZOLE SODIUM 40 MILLIGRAM(S): 20 TABLET, DELAYED RELEASE ORAL at 06:28

## 2018-06-04 RX ADMIN — CHLORHEXIDINE GLUCONATE 1 APPLICATION(S): 213 SOLUTION TOPICAL at 12:41

## 2018-06-04 RX ADMIN — Medication 166.67 MILLIGRAM(S): at 04:41

## 2018-06-04 RX ADMIN — Medication 166.67 MILLIGRAM(S): at 23:04

## 2018-06-04 RX ADMIN — SENNA PLUS 2 TABLET(S): 8.6 TABLET ORAL at 22:17

## 2018-06-04 RX ADMIN — FINASTERIDE 5 MILLIGRAM(S): 5 TABLET, FILM COATED ORAL at 12:40

## 2018-06-04 RX ADMIN — Medication 100 MILLIGRAM(S): at 06:26

## 2018-06-04 NOTE — PROGRESS NOTE ADULT - ATTENDING COMMENTS
Mr. Plascencia is currently critically ill with evidence of sepsis of unclear etiology requiring IV norepinphrine. An atrial line should be placed for closer monitoring. He appears euvolemic. I would not further decrease dobutamine. He has failed prior attempts and is unlikely going to be able to tolerate weaning as noted by his primary cardiology team.     Please call me with questions at [phone].

## 2018-06-04 NOTE — PROGRESS NOTE ADULT - SUBJECTIVE AND OBJECTIVE BOX
SUBJECTIVE: Receiving HD, weak. Fevers noted.  	  MEDICATIONS:  amiodarone    Tablet 100 milliGRAM(s) Oral daily  DOBUTamine Infusion 2.5 MICROgram(s)/kG/Min IV Continuous <Continuous>  norepinephrine Infusion 0.05 MICROgram(s)/kG/Min IV Continuous <Continuous>  piperacillin/tazobactam IVPB. 3.375 Gram(s) IV Intermittent every 12 hours  ALBUTerol/ipratropium for Nebulization 3 milliLiter(s) Nebulizer every 6 hours PRN  bisacodyl 5 milliGRAM(s) Oral at bedtime  docusate sodium 100 milliGRAM(s) Oral two times a day  pantoprazole    Tablet 40 milliGRAM(s) Oral before breakfast  senna 2 Tablet(s) Oral at bedtime  atorvastatin 80 milliGRAM(s) Oral at bedtime  dextrose 40% Gel 15 Gram(s) Oral once PRN  dextrose 50% Injectable 12.5 Gram(s) IV Push once  dextrose 50% Injectable 25 Gram(s) IV Push once  dextrose 50% Injectable 25 Gram(s) IV Push once  finasteride 5 milliGRAM(s) Oral daily  glucagon  Injectable 1 milliGRAM(s) IntraMuscular once PRN  insulin lispro (HumaLOG) corrective regimen sliding scale   SubCutaneous three times a day before meals  insulin lispro (HumaLOG) corrective regimen sliding scale   SubCutaneous at bedtime  levothyroxine 75 MICROGram(s) Oral daily  chlorhexidine 4% Liquid 1 Application(s) Topical <User Schedule>  dextrose 5%. 1000 milliLiter(s) IV Continuous <Continuous>  sodium chloride 0.65% Nasal 1 Spray(s) Both Nostrils four times a day      REVIEW OF SYSTEMS:    CONSTITUTIONAL: No fever, weight loss, or fatigue  EYES: No eye pain, visual disturbances, or discharge  NECK: No pain or stiffness  RESPIRATORY: No cough, wheezing, chills or hemoptysis; No Shortness of Breath  CARDIOVASCULAR: No chest pain, palpitations, dizziness, or leg swelling  GASTROINTESTINAL: No abdominal or epigastric pain. No nausea, vomiting, or hematemesis; No diarrhea or constipation. No melena or hematochezia.  GENITOURINARY: No dysuria, frequency, hematuria, or incontinence  NEUROLOGICAL: No headaches, memory loss, loss of strength, numbness, or tremors  SKIN: No itching, burning, rashes, or lesions   LYMPH Nodes: No enlarged glands  MUSCULOSKELETAL: No joint pain or swelling; No muscle, back, or extremity pain  All other review of systems are negative.  	    PHYSICAL EXAM:  T(C): 36.4 (06-04-18 @ 07:27), Max: 37.1 (06-03-18 @ 15:48)  HR: 102 (06-04-18 @ 09:00) (85 - 113)  BP: 103/59 (06-04-18 @ 09:00) (72/47 - 111/72)  RR: 15 (06-04-18 @ 09:00) (12 - 28)  SpO2: 100% (06-04-18 @ 09:00) (74% - 100%)  Wt(kg): --  I&O's Summary    03 Jun 2018 07:01  -  04 Jun 2018 07:00  --------------------------------------------------------  IN: 555.8 mL / OUT: 0 mL / NET: 555.8 mL          PHYSICAL EXAM    Appearance: Normal	  HEENT:   Normal oral mucosa, PERRL, EOMI	  NECK: Soft and supple, No LAD, No JVD  Cardiovascular: Regular Rate and Rhythm, normal s1s2 healed icd scar  Respiratory: decreased bilaterally  Gastrointestinal:  Soft, Non-tender, + BS	  Skin: No rashes, No ecchymoses, No cyanosis  Neurologic: Non-focal  Extremities: No clubbing, cyanosis or edema      LABS:	 	                     12.7   15.07 )-----------( 148      ( 04 Jun 2018 04:20 )             39.7     06-04    126<L>  |  88<L>  |  45<H>  ----------------------------<  223<H>  5.2   |  20<L>  |  6.49<H>    Ca    8.8      04 Jun 2018 04:20  Phos  2.9     06-04  Mg     2.3     06-04    TPro  7.8  /  Alb  2.7<L>  /  TBili  0.5  /  DBili  x   /  AST  15  /  ALT  15  /  AlkPhos  194<H>  06-04

## 2018-06-04 NOTE — PROGRESS NOTE ADULT - PROBLEM SELECTOR PLAN 1
-Weekend events noted. Was placed on norepi for hypotension, possible sepsis.   -Currently on dobutamine 2.5 mcg/kg/min, would continue.   -No BB while on . Unable to tolerate ACEI/ARB/ARNI due to low BP.   -He has not been an advanced HF therapy (LVAD/transplant) candidate in past due to severe interstitial lung disease. After numerous attempts to optimize HF, pt still has symptoms of SOB- likely combination of both ADHF and severe interstitial disease. Palliative approach is most appropriate for this patient.   -Continue fluid removal via HD. -Weekend events noted. Was placed on norepi for hypotension, possible sepsis.   -Currently on dobutamine 2.5 mcg/kg/min, would continue or possibly increase to 5 mcg/kg/min  -No BB while on . Unable to tolerate ACEI/ARB/ARNI due to low BP.   -He has not been an advanced HF therapy (LVAD/transplant) candidate in past due to severe interstitial lung disease. After numerous attempts to optimize HF, pt still has symptoms of SOB- likely combination of both ADHF and severe interstitial disease. Palliative approach is most appropriate for this patient.   -Continue fluid removal via HD.

## 2018-06-04 NOTE — PROGRESS NOTE ADULT - SUBJECTIVE AND OBJECTIVE BOX
[facility] - Mark / Khai PADRON /Jennifer/ VITO García/ VITO Leigh/ Kolton Farr / LISSA Njeru  ---------------------------------------------------------------------------------------------------------------    Patient seen and examined bedside, in CCU    Subjective and Objective: sob better. No complaints today. tolerated HD well in am  started on Levophed    Allergies: No Known Allergies      Hospital Medications:   MEDICATIONS  (STANDING):  amiodarone    Tablet 100 milliGRAM(s) Oral daily  atorvastatin 80 milliGRAM(s) Oral at bedtime  bisacodyl 5 milliGRAM(s) Oral at bedtime  chlorhexidine 4% Liquid 1 Application(s) Topical <User Schedule>  dextrose 50% Injectable 12.5 Gram(s) IV Push once  dextrose 50% Injectable 25 Gram(s) IV Push once  dextrose 50% Injectable 25 Gram(s) IV Push once  DOBUTamine Infusion 2.5 MICROgram(s)/kG/Min (5.325 mL/Hr) IV Continuous <Continuous>  docusate sodium 100 milliGRAM(s) Oral two times a day  finasteride 5 milliGRAM(s) Oral daily  insulin lispro (HumaLOG) corrective regimen sliding scale   SubCutaneous three times a day before meals  insulin lispro (HumaLOG) corrective regimen sliding scale   SubCutaneous at bedtime  levothyroxine 75 MICROGram(s) Oral daily  norepinephrine Infusion 0.05 MICROgram(s)/kG/Min (3.328 mL/Hr) IV Continuous <Continuous>  pantoprazole    Tablet 40 milliGRAM(s) Oral before breakfast  piperacillin/tazobactam IVPB. 3.375 Gram(s) IV Intermittent every 12 hours  senna 2 Tablet(s) Oral at bedtime  sevelamer hydrochloride 1600 milliGRAM(s) Oral <User Schedule>  sodium chloride 0.65% Nasal 1 Spray(s) Both Nostrils four times a day      VITALS:  T(F): 97.6 (06-04-18 @ 13:00), Max: 98.7 (06-04-18 @ 00:00)  HR: 98 (06-04-18 @ 15:00)  BP: 100/55 (06-04-18 @ 15:00)  RR: 19 (06-04-18 @ 15:00)  SpO2: 99% (06-04-18 @ 15:00)  Wt(kg): --    06-03 @ 07:01  -  06-04 @ 07:00  --------------------------------------------------------  IN: 555.8 mL / OUT: 0 mL / NET: 555.8 mL    06-04 @ 07:01  -  06-04 @ 15:53  --------------------------------------------------------  IN: 400 mL / OUT: 3900 mL / NET: -3500 mL    PHYSICAL EXAM:  Constitutional: NAD  HEENT: anicteric sclera, oropharynx clear  Neck: No JVD  Respiratory: bibasilar crepts+, no wheezes  Cardiovascular: S1, S2, RRR  Gastrointestinal: BS+, soft, NT/ND  Extremities: No cyanosis or clubbing. No peripheral edema  Neurological: A/O x 3, no focal deficits  Psychiatric: Normal mood, normal affect  : No CVA tenderness. No rosa.   Skin: No rashes  Vascular Access: rt IJ PC    LABS:  06-04    126<L>  |  88<L>  |  45<H>  ----------------------------<  223<H>  5.2   |  20<L>  |  6.49<H>    Ca    8.8      04 Jun 2018 04:20  Phos  2.9     06-04  Mg     2.3     06-04    TPro  7.8  /  Alb  2.7<L>  /  TBili  0.5  /  DBili      /  AST  15  /  ALT  15  /  AlkPhos  194<H>  06-04    Creatinine Trend: 6.49 <--, 5.38 <--, 4.20 <--, 4.87 <--, 3.73 <--, 5.53 <--, 4.51 <--                        12.7   15.07 )-----------( 148      ( 04 Jun 2018 04:20 )             39.7     Urine Studies:        RADIOLOGY & ADDITIONAL STUDIES:

## 2018-06-04 NOTE — DIETITIAN INITIAL EVALUATION ADULT. - OTHER INFO
Nutrition assessment initiated for critical care LOS. Pt. sleepy , in nasal cannula , per nursing  pt. took most of his breakfast & lunch  together as he was on dialysis.  Pt. known from previous admission, noted slight wt. decrease from previous admission .

## 2018-06-04 NOTE — DIETITIAN INITIAL EVALUATION ADULT. - PROBLEM SELECTOR PLAN 2
Patient with EF 21% on 10/17 TTE. On dobutamine gtt as an outpatient. S/p midodrine in the ED.  - C/w dobutamine 7.5 mcg/kg/min. Patient would benefit from titratable  gtt.   - Will c/s CCU.

## 2018-06-04 NOTE — PROGRESS NOTE ADULT - SUBJECTIVE AND OBJECTIVE BOX
Note Incomplete  --- Pending Attending Rounds    Briefly: 63yM severe HFrEF, ESRD, Afib, Pulm fibrosis here for worsened dyspnea, course c/b abdom pain, fevers, hypotension, on broad-spectrum abx    Interval Events:     Subjective:  GEN no fevers, no chills  RESP breathing OK on Venturi mask, no cough  CV no chest pain, no palpitations  GI abdominal pain. no nausea.     Physical:  ICU Vital Signs Last 24 Hrs  T(C): 37.1 (04 Jun 2018 04:00), Max: 37.1 (03 Jun 2018 15:48)  T(F): 98.7 (04 Jun 2018 04:00), Max: 98.7 (03 Jun 2018 15:48)  HR: 85 (04 Jun 2018 06:00) (85 - 113)  BP: 87/54 (04 Jun 2018 05:30) (72/47 - 111/72)  BP(mean): 61 (04 Jun 2018 05:30) (50 - 81)  ABP: --  ABP(mean): --  RR: 12 (04 Jun 2018 06:00) (12 - 28)  SpO2: 99% (04 Jun 2018 06:00) (74% - 100%)      Telemetry:     EKG:    Exam:  LINES RIJ Perma-cath non-tender non-swollen, LUE PICC non-tender non-swollen  GEN lying in bed, eyes closed, answers questions appropriately  HEENT forehead warm, JVD ~6cm  CHEST/RESP anterior lung fields w crackles L>R, lateral lung fields w crackles b/l  ABD bowel sounds present, not hyperactive. R-sided tenderness w guarding and rebound tenderness.   EXT feet cool b/l. Distal lower extremities without pedal edema.   GTT dobutamine 2.5 mcg/kg/min, norepinephrine 0.25 mcg/kg/min    LABS:  CAPILLARY BLOOD GLUCOSE      POCT Blood Glucose.: 224 mg/dL (03 Jun 2018 22:18)    I&O's Summary    02 Jun 2018 07:01  -  03 Jun 2018 07:00  --------------------------------------------------------  IN: 1903.1 mL / OUT: 1194 mL / NET: 709.1 mL    03 Jun 2018 07:01  -  04 Jun 2018 06:54  --------------------------------------------------------  IN: 555.8 mL / OUT: 0 mL / NET: 555.8 mL                              12.7   15.07 )-----------( 148      ( 04 Jun 2018 04:20 )             39.7     WBC Trend: 15.07<--, 25.43<--, 17.92<--  06-04    126<L>  |  88<L>  |  45<H>  ----------------------------<  223<H>  5.2   |  20<L>  |  6.49<H>    Ca    8.8      04 Jun 2018 04:20  Phos  2.9     06-04  Mg     2.3     06-04    TPro  7.8  /  Alb  2.7<L>  /  TBili  0.5  /  DBili  x   /  AST  15  /  ALT  15  /  AlkPhos  194<H>  06-04    Creatinine Trend: 6.49<--, 5.38<--, 4.20<--, 4.87<--, 3.73<--, 5.53<--  PT/INR - ( 04 Jun 2018 04:20 )   PT: 81.9 SEC;   INR: 6.85          PTT - ( 04 Jun 2018 04:20 )  PTT:72.6 SEC          Imaging:        MEDICATIONS  (STANDING):  amiodarone    Tablet 100 milliGRAM(s) Oral daily  atorvastatin 80 milliGRAM(s) Oral at bedtime  bisacodyl 5 milliGRAM(s) Oral at bedtime  chlorhexidine 4% Liquid 1 Application(s) Topical <User Schedule>  dextrose 5%. 1000 milliLiter(s) (50 mL/Hr) IV Continuous <Continuous>  dextrose 50% Injectable 12.5 Gram(s) IV Push once  dextrose 50% Injectable 25 Gram(s) IV Push once  dextrose 50% Injectable 25 Gram(s) IV Push once  DOBUTamine Infusion 2.5 MICROgram(s)/kG/Min (5.325 mL/Hr) IV Continuous <Continuous>  docusate sodium 100 milliGRAM(s) Oral two times a day  finasteride 5 milliGRAM(s) Oral daily  insulin lispro (HumaLOG) corrective regimen sliding scale   SubCutaneous three times a day before meals  insulin lispro (HumaLOG) corrective regimen sliding scale   SubCutaneous at bedtime  levothyroxine 75 MICROGram(s) Oral daily  norepinephrine Infusion 0.05 MICROgram(s)/kG/Min (3.328 mL/Hr) IV Continuous <Continuous>  pantoprazole    Tablet 40 milliGRAM(s) Oral before breakfast  piperacillin/tazobactam IVPB. 3.375 Gram(s) IV Intermittent every 12 hours  senna 2 Tablet(s) Oral at bedtime  sevelamer hydrochloride 1600 milliGRAM(s) Oral <User Schedule>  sodium chloride 0.65% Nasal 1 Spray(s) Both Nostrils four times a day    MEDICATIONS  (PRN):  ALBUTerol/ipratropium for Nebulization 3 milliLiter(s) Nebulizer every 6 hours PRN Shortness of Breath and/or Wheezing  dextrose 40% Gel 15 Gram(s) Oral once PRN Blood Glucose LESS THAN 70 milliGRAM(s)/deciliter  glucagon  Injectable 1 milliGRAM(s) IntraMuscular once PRN Glucose LESS THAN 70 milligrams/deciliter      Consult Recommendations: Note Incomplete  --- Pending Attending Rounds    Briefly: 63yM severe HFrEF, ESRD, Afib, Pulm fibrosis here for worsened dyspnea, course c/b abdom pain, fevers, hypotension, on broad-spectrum abx    Interval Events:     Subjective:  GEN no fevers, no chills  RESP breathing OK on Venturi mask, no cough  CV no chest pain, no palpitations  GI abdominal pain. no nausea.     Physical:  ICU Vital Signs Last 24 Hrs  T(C): 37.1 (04 Jun 2018 04:00), Max: 37.1 (03 Jun 2018 15:48)  T(F): 98.7 (04 Jun 2018 04:00), Max: 98.7 (03 Jun 2018 15:48)  HR: 85 (04 Jun 2018 06:00) (85 - 113)  BP: 87/54 (04 Jun 2018 05:30) (72/47 - 111/72)  BP(mean): 61 (04 Jun 2018 05:30) (50 - 81)  ABP: --  ABP(mean): --  RR: 12 (04 Jun 2018 06:00) (12 - 28)  SpO2: 99% (04 Jun 2018 06:00) (74% - 100%)      Telemetry:     EKG:    Exam:  LINES RIJ Perma-cath non-tender non-swollen, LUE PICC non-tender non-swollen  GEN lying in bed, eyes closed, answers questions appropriately  HEENT forehead warm, JVD ~6cm  CHEST/RESP anterior lung fields w crackles L>R, lateral lung fields w crackles b/l  ABD bowel sounds present, not hyperactive. R-sided tenderness w guarding and rebound tenderness.   GROIN R groin (site of recent [place]-Chris catheter) non-swollen, non-tender  EXT feet cool b/l. Distal lower extremities without pedal edema.   GTT dobutamine 2.5 mcg/kg/min, norepinephrine 0.25 mcg/kg/min    LABS:  CAPILLARY BLOOD GLUCOSE      POCT Blood Glucose.: 224 mg/dL (03 Jun 2018 22:18)    I&O's Summary    02 Jun 2018 07:01  -  03 Jun 2018 07:00  --------------------------------------------------------  IN: 1903.1 mL / OUT: 1194 mL / NET: 709.1 mL    03 Jun 2018 07:01  -  04 Jun 2018 06:54  --------------------------------------------------------  IN: 555.8 mL / OUT: 0 mL / NET: 555.8 mL                              12.7   15.07 )-----------( 148      ( 04 Jun 2018 04:20 )             39.7     WBC Trend: 15.07<--, 25.43<--, 17.92<--  06-04    126<L>  |  88<L>  |  45<H>  ----------------------------<  223<H>  5.2   |  20<L>  |  6.49<H>    Ca    8.8      04 Jun 2018 04:20  Phos  2.9     06-04  Mg     2.3     06-04    TPro  7.8  /  Alb  2.7<L>  /  TBili  0.5  /  DBili  x   /  AST  15  /  ALT  15  /  AlkPhos  194<H>  06-04    Creatinine Trend: 6.49<--, 5.38<--, 4.20<--, 4.87<--, 3.73<--, 5.53<--  PT/INR - ( 04 Jun 2018 04:20 )   PT: 81.9 SEC;   INR: 6.85          PTT - ( 04 Jun 2018 04:20 )  PTT:72.6 SEC          Imaging:        MEDICATIONS  (STANDING):  amiodarone    Tablet 100 milliGRAM(s) Oral daily  atorvastatin 80 milliGRAM(s) Oral at bedtime  bisacodyl 5 milliGRAM(s) Oral at bedtime  chlorhexidine 4% Liquid 1 Application(s) Topical <User Schedule>  dextrose 5%. 1000 milliLiter(s) (50 mL/Hr) IV Continuous <Continuous>  dextrose 50% Injectable 12.5 Gram(s) IV Push once  dextrose 50% Injectable 25 Gram(s) IV Push once  dextrose 50% Injectable 25 Gram(s) IV Push once  DOBUTamine Infusion 2.5 MICROgram(s)/kG/Min (5.325 mL/Hr) IV Continuous <Continuous>  docusate sodium 100 milliGRAM(s) Oral two times a day  finasteride 5 milliGRAM(s) Oral daily  insulin lispro (HumaLOG) corrective regimen sliding scale   SubCutaneous three times a day before meals  insulin lispro (HumaLOG) corrective regimen sliding scale   SubCutaneous at bedtime  levothyroxine 75 MICROGram(s) Oral daily  norepinephrine Infusion 0.05 MICROgram(s)/kG/Min (3.328 mL/Hr) IV Continuous <Continuous>  pantoprazole    Tablet 40 milliGRAM(s) Oral before breakfast  piperacillin/tazobactam IVPB. 3.375 Gram(s) IV Intermittent every 12 hours  senna 2 Tablet(s) Oral at bedtime  sevelamer hydrochloride 1600 milliGRAM(s) Oral <User Schedule>  sodium chloride 0.65% Nasal 1 Spray(s) Both Nostrils four times a day    MEDICATIONS  (PRN):  ALBUTerol/ipratropium for Nebulization 3 milliLiter(s) Nebulizer every 6 hours PRN Shortness of Breath and/or Wheezing  dextrose 40% Gel 15 Gram(s) Oral once PRN Blood Glucose LESS THAN 70 milliGRAM(s)/deciliter  glucagon  Injectable 1 milliGRAM(s) IntraMuscular once PRN Glucose LESS THAN 70 milligrams/deciliter      Consult Recommendations: Note Incomplete -- Pending Attending Rounds    Briefly: 63yM severe HFrEF, ESRD, Afib, Pulm fibrosis here for worsened dyspnea, course c/b abdom pain, fevers, hypotension, on broad-spectrum abx    Interval Events: on nasal cannula desaturated to high 70s    Subjective:  GEN no fevers, no chills  RESP breathing OK on Venturi mask, no cough  CV no chest pain, no palpitations  GI abdominal pain. no nausea.     Physical:  ICU Vital Signs Last 24 Hrs  T(C): 37.1 (04 Jun 2018 04:00), Max: 37.1 (03 Jun 2018 15:48)  T(F): 98.7 (04 Jun 2018 04:00), Max: 98.7 (03 Jun 2018 15:48)  HR: 85 (04 Jun 2018 06:00) (85 - 113)  BP: 87/54 (04 Jun 2018 05:30) (72/47 - 111/72)  BP(mean): 61 (04 Jun 2018 05:30) (50 - 81)  ABP: --  ABP(mean): --  RR: 12 (04 Jun 2018 06:00) (12 - 28)  SpO2: 99% (04 Jun 2018 06:00) (74% - 100%)      Telemetry: Afib 80s-100s, runs of NSVT up 4 consecutive beats    EKG 6/4:  - A-fib HR 81, QTc 453 per automated read, extremity leads w low voltage, PVCs, Q in V1, pRWP, no ST elevations or depressions, TWi aVL    Exam:  LINES RIJ Perma-cath non-tender non-swollen, LUE PICC non-tender non-swollen  GEN lying in bed, eyes closed, answers questions appropriately  HEENT forehead warm, JVD ~6cm  CHEST/RESP anterior lung fields w crackles L>R, lateral lung fields w crackles b/l  ABD bowel sounds present, not hyperactive. R-sided tenderness w guarding and rebound tenderness.   GROIN R groin (site of recent [place]-Chrsi catheter) non-swollen, non-tender  EXT feet cool b/l. Distal lower extremities without pedal edema.   NEURO alert and oriented  GTT dobutamine 2.5 mcg/kg/min, norepinephrine 0.25 mcg/kg/min    LABS:  CAPILLARY BLOOD GLUCOSE      POCT Blood Glucose.: 224 mg/dL (03 Jun 2018 22:18)    I&O's Summary    02 Jun 2018 07:01  -  03 Jun 2018 07:00  --------------------------------------------------------  IN: 1903.1 mL / OUT: 1194 mL / NET: 709.1 mL    03 Jun 2018 07:01  -  04 Jun 2018 06:54  --------------------------------------------------------  IN: 555.8 mL / OUT: 0 mL / NET: 555.8 mL                              12.7   15.07 )-----------( 148      ( 04 Jun 2018 04:20 )             39.7     WBC Trend: 15.07<--, 25.43<--, 17.92<--  06-04    126<L>  |  88<L>  |  45<H>  ----------------------------<  223<H>  5.2   |  20<L>  |  6.49<H>    Ca    8.8      04 Jun 2018 04:20  Phos  2.9     06-04  Mg     2.3     06-04    TPro  7.8  /  Alb  2.7<L>  /  TBili  0.5  /  DBili  x   /  AST  15  /  ALT  15  /  AlkPhos  194<H>  06-04    Creatinine Trend: 6.49<--, 5.38<--, 4.20<--, 4.87<--, 3.73<--, 5.53<--  PT/INR - ( 04 Jun 2018 04:20 )   PT: 81.9 SEC;   INR: 6.85          PTT - ( 04 Jun 2018 04:20 )  PTT:72.6 SEC          Imaging:        MEDICATIONS  (STANDING):  amiodarone    Tablet 100 milliGRAM(s) Oral daily  atorvastatin 80 milliGRAM(s) Oral at bedtime  bisacodyl 5 milliGRAM(s) Oral at bedtime  chlorhexidine 4% Liquid 1 Application(s) Topical <User Schedule>  dextrose 5%. 1000 milliLiter(s) (50 mL/Hr) IV Continuous <Continuous>  dextrose 50% Injectable 12.5 Gram(s) IV Push once  dextrose 50% Injectable 25 Gram(s) IV Push once  dextrose 50% Injectable 25 Gram(s) IV Push once  DOBUTamine Infusion 2.5 MICROgram(s)/kG/Min (5.325 mL/Hr) IV Continuous <Continuous>  docusate sodium 100 milliGRAM(s) Oral two times a day  finasteride 5 milliGRAM(s) Oral daily  insulin lispro (HumaLOG) corrective regimen sliding scale   SubCutaneous three times a day before meals  insulin lispro (HumaLOG) corrective regimen sliding scale   SubCutaneous at bedtime  levothyroxine 75 MICROGram(s) Oral daily  norepinephrine Infusion 0.05 MICROgram(s)/kG/Min (3.328 mL/Hr) IV Continuous <Continuous>  pantoprazole    Tablet 40 milliGRAM(s) Oral before breakfast  piperacillin/tazobactam IVPB. 3.375 Gram(s) IV Intermittent every 12 hours  senna 2 Tablet(s) Oral at bedtime  sevelamer hydrochloride 1600 milliGRAM(s) Oral <User Schedule>  sodium chloride 0.65% Nasal 1 Spray(s) Both Nostrils four times a day    MEDICATIONS  (PRN):  ALBUTerol/ipratropium for Nebulization 3 milliLiter(s) Nebulizer every 6 hours PRN Shortness of Breath and/or Wheezing  dextrose 40% Gel 15 Gram(s) Oral once PRN Blood Glucose LESS THAN 70 milliGRAM(s)/deciliter  glucagon  Injectable 1 milliGRAM(s) IntraMuscular once PRN Glucose LESS THAN 70 milligrams/deciliter      Consult Recommendations: Note Incomplete -- Pending Attending Rounds    Briefly: 63yM severe HFrEF, ESRD, Afib, Pulm fibrosis here for worsened dyspnea, course c/b abdom pain, fevers, hypotension, on broad-spectrum abx    Interval Events: on nasal cannula desaturated to high 70s    Subjective:  GEN no fevers, no chills  RESP breathing OK on Venturi mask, no cough  CV no chest pain, no palpitations  GI abdominal pain. no nausea.     Physical:  ICU Vital Signs Last 24 Hrs  T(C): 37.1 (04 Jun 2018 04:00), Max: 37.1 (03 Jun 2018 15:48)  T(F): 98.7 (04 Jun 2018 04:00), Max: 98.7 (03 Jun 2018 15:48)  HR: 85 (04 Jun 2018 06:00) (85 - 113)  BP: 87/54 (04 Jun 2018 05:30) (72/47 - 111/72)  BP(mean): 61 (04 Jun 2018 05:30) (50 - 81)  ABP: --  ABP(mean): --  RR: 12 (04 Jun 2018 06:00) (12 - 28)  SpO2: 99% (04 Jun 2018 06:00) (74% - 100%)      Telemetry: Afib 80s-100s, runs of NSVT up 4 consecutive beats    EKG 6/4:  - A-fib HR 81, QTc 453 per automated read, extremity leads w low voltage, PVCs, Q in V1, pRWP, no ST elevations or depressions, TWi aVL  - Compared to 6/3, lower voltage in extremity leads  - Impresssion: A-fib w rate control, extrem leads w low voltage, no acute ischem changes    Exam:  LINES RIJ Perma-cath non-tender non-swollen, LUE PICC non-tender non-swollen  GEN lying in bed, eyes closed, answers questions appropriately  HEENT forehead warm, JVD ~6cm  CHEST/RESP anterior lung fields w crackles L>R, lateral lung fields w crackles b/l  ABD bowel sounds present, not hyperactive. R-sided tenderness w guarding and rebound tenderness.   GROIN R groin (site of recent [place]-Chris catheter) non-swollen, non-tender  EXT feet cool b/l. Distal lower extremities without pedal edema.   NEURO alert and oriented  GTT dobutamine 2.5 mcg/kg/min, norepinephrine 0.25 mcg/kg/min    LABS:  CAPILLARY BLOOD GLUCOSE      POCT Blood Glucose.: 224 mg/dL (03 Jun 2018 22:18)    I&O's Summary    02 Jun 2018 07:01  -  03 Jun 2018 07:00  --------------------------------------------------------  IN: 1903.1 mL / OUT: 1194 mL / NET: 709.1 mL    03 Jun 2018 07:01  -  04 Jun 2018 06:54  --------------------------------------------------------  IN: 555.8 mL / OUT: 0 mL / NET: 555.8 mL                              12.7   15.07 )-----------( 148      ( 04 Jun 2018 04:20 )             39.7     WBC Trend: 15.07<--, 25.43<--, 17.92<--  06-04    126<L>  |  88<L>  |  45<H>  ----------------------------<  223<H>  5.2   |  20<L>  |  6.49<H>    Ca    8.8      04 Jun 2018 04:20  Phos  2.9     06-04  Mg     2.3     06-04    TPro  7.8  /  Alb  2.7<L>  /  TBili  0.5  /  DBili  x   /  AST  15  /  ALT  15  /  AlkPhos  194<H>  06-04    Creatinine Trend: 6.49<--, 5.38<--, 4.20<--, 4.87<--, 3.73<--, 5.53<--  PT/INR - ( 04 Jun 2018 04:20 )   PT: 81.9 SEC;   INR: 6.85          PTT - ( 04 Jun 2018 04:20 )  PTT:72.6 SEC          Imaging:        MEDICATIONS  (STANDING):  amiodarone    Tablet 100 milliGRAM(s) Oral daily  atorvastatin 80 milliGRAM(s) Oral at bedtime  bisacodyl 5 milliGRAM(s) Oral at bedtime  chlorhexidine 4% Liquid 1 Application(s) Topical <User Schedule>  dextrose 5%. 1000 milliLiter(s) (50 mL/Hr) IV Continuous <Continuous>  dextrose 50% Injectable 12.5 Gram(s) IV Push once  dextrose 50% Injectable 25 Gram(s) IV Push once  dextrose 50% Injectable 25 Gram(s) IV Push once  DOBUTamine Infusion 2.5 MICROgram(s)/kG/Min (5.325 mL/Hr) IV Continuous <Continuous>  docusate sodium 100 milliGRAM(s) Oral two times a day  finasteride 5 milliGRAM(s) Oral daily  insulin lispro (HumaLOG) corrective regimen sliding scale   SubCutaneous three times a day before meals  insulin lispro (HumaLOG) corrective regimen sliding scale   SubCutaneous at bedtime  levothyroxine 75 MICROGram(s) Oral daily  norepinephrine Infusion 0.05 MICROgram(s)/kG/Min (3.328 mL/Hr) IV Continuous <Continuous>  pantoprazole    Tablet 40 milliGRAM(s) Oral before breakfast  piperacillin/tazobactam IVPB. 3.375 Gram(s) IV Intermittent every 12 hours  senna 2 Tablet(s) Oral at bedtime  sevelamer hydrochloride 1600 milliGRAM(s) Oral <User Schedule>  sodium chloride 0.65% Nasal 1 Spray(s) Both Nostrils four times a day    MEDICATIONS  (PRN):  ALBUTerol/ipratropium for Nebulization 3 milliLiter(s) Nebulizer every 6 hours PRN Shortness of Breath and/or Wheezing  dextrose 40% Gel 15 Gram(s) Oral once PRN Blood Glucose LESS THAN 70 milliGRAM(s)/deciliter  glucagon  Injectable 1 milliGRAM(s) IntraMuscular once PRN Glucose LESS THAN 70 milligrams/deciliter      Consult Recommendations: Note Complete    Briefly: 63yM severe HFrEF, ESRD, Afib, Pulm fibrosis here for worsened dyspnea, course c/b abdom pain, fevers, hypotension, on broad abx    Interval Events: on nasal cannula desaturated to high 70s    Subjective:  GEN no fevers, no chills  RESP breathing OK on Venturi mask, no cough  CV no chest pain, no palpitations  GI abdominal pain. no nausea.     Physical:  ICU Vital Signs Last 24 Hrs  T(C): 37.1 (04 Jun 2018 04:00), Max: 37.1 (03 Jun 2018 15:48)  T(F): 98.7 (04 Jun 2018 04:00), Max: 98.7 (03 Jun 2018 15:48)  HR: 85 (04 Jun 2018 06:00) (85 - 113)  BP: 87/54 (04 Jun 2018 05:30) (72/47 - 111/72)  BP(mean): 61 (04 Jun 2018 05:30) (50 - 81)  ABP: --  ABP(mean): --  RR: 12 (04 Jun 2018 06:00) (12 - 28)  SpO2: 99% (04 Jun 2018 06:00) (74% - 100%)      Telemetry: Afib 80s-100s, runs of NSVT up 4 consecutive beats    EKG 6/4:  - A-fib HR 81, QTc 453 per automated read, extremity leads w low voltage, PVCs, Q in V1, pRWP, no ST elevations or depressions, TWi aVL  - Compared to 6/3, lower voltage in extremity leads  - Impresssion: A-fib w rate control, extrem leads w low voltage, no acute ischem changes    Exam:  LINES RIJ Perma-cath non-tender non-swollen, LUE PICC non-tender non-swollen  GEN lying in bed, eyes closed, answers questions appropriately  HEENT forehead warm, JVD ~6cm  CHEST/RESP anterior lung fields w crackles L>R, lateral lung fields w crackles b/l  ABD bowel sounds present, not hyperactive. R-sided tenderness w guarding and rebound tenderness.   GROIN R groin (site of recent [place]-Chris catheter) non-swollen, non-tender  EXT feet cool b/l. Distal lower extremities without pedal edema.   NEURO alert and oriented  GTT dobutamine 2.5 mcg/kg/min, norepinephrine 0.25 mcg/kg/min    LABS:  CAPILLARY BLOOD GLUCOSE      POCT Blood Glucose.: 224 mg/dL (03 Jun 2018 22:18)    I&O's Summary    02 Jun 2018 07:01  -  03 Jun 2018 07:00  --------------------------------------------------------  IN: 1903.1 mL / OUT: 1194 mL / NET: 709.1 mL    03 Jun 2018 07:01  -  04 Jun 2018 06:54  --------------------------------------------------------  IN: 555.8 mL / OUT: 0 mL / NET: 555.8 mL                              12.7   15.07 )-----------( 148      ( 04 Jun 2018 04:20 )             39.7     WBC Trend: 15.07<--, 25.43<--, 17.92<--  06-04    126<L>  |  88<L>  |  45<H>  ----------------------------<  223<H>  5.2   |  20<L>  |  6.49<H>    Ca    8.8      04 Jun 2018 04:20  Phos  2.9     06-04  Mg     2.3     06-04    TPro  7.8  /  Alb  2.7<L>  /  TBili  0.5  /  DBili  x   /  AST  15  /  ALT  15  /  AlkPhos  194<H>  06-04    Creatinine Trend: 6.49<--, 5.38<--, 4.20<--, 4.87<--, 3.73<--, 5.53<--  PT/INR - ( 04 Jun 2018 04:20 )   PT: 81.9 SEC;   INR: 6.85          PTT - ( 04 Jun 2018 04:20 )  PTT:72.6 SEC          Imaging:        MEDICATIONS  (STANDING):  amiodarone    Tablet 100 milliGRAM(s) Oral daily  atorvastatin 80 milliGRAM(s) Oral at bedtime  bisacodyl 5 milliGRAM(s) Oral at bedtime  chlorhexidine 4% Liquid 1 Application(s) Topical <User Schedule>  dextrose 5%. 1000 milliLiter(s) (50 mL/Hr) IV Continuous <Continuous>  dextrose 50% Injectable 12.5 Gram(s) IV Push once  dextrose 50% Injectable 25 Gram(s) IV Push once  dextrose 50% Injectable 25 Gram(s) IV Push once  DOBUTamine Infusion 2.5 MICROgram(s)/kG/Min (5.325 mL/Hr) IV Continuous <Continuous>  docusate sodium 100 milliGRAM(s) Oral two times a day  finasteride 5 milliGRAM(s) Oral daily  insulin lispro (HumaLOG) corrective regimen sliding scale   SubCutaneous three times a day before meals  insulin lispro (HumaLOG) corrective regimen sliding scale   SubCutaneous at bedtime  levothyroxine 75 MICROGram(s) Oral daily  norepinephrine Infusion 0.05 MICROgram(s)/kG/Min (3.328 mL/Hr) IV Continuous <Continuous>  pantoprazole    Tablet 40 milliGRAM(s) Oral before breakfast  piperacillin/tazobactam IVPB. 3.375 Gram(s) IV Intermittent every 12 hours  senna 2 Tablet(s) Oral at bedtime  sevelamer hydrochloride 1600 milliGRAM(s) Oral <User Schedule>  sodium chloride 0.65% Nasal 1 Spray(s) Both Nostrils four times a day    MEDICATIONS  (PRN):  ALBUTerol/ipratropium for Nebulization 3 milliLiter(s) Nebulizer every 6 hours PRN Shortness of Breath and/or Wheezing  dextrose 40% Gel 15 Gram(s) Oral once PRN Blood Glucose LESS THAN 70 milliGRAM(s)/deciliter  glucagon  Injectable 1 milliGRAM(s) IntraMuscular once PRN Glucose LESS THAN 70 milligrams/deciliter      Consult Recommendations:

## 2018-06-04 NOTE — PROGRESS NOTE ADULT - PROBLEM SELECTOR PLAN 2
w/shock- BP low, asympt, chronic low   c/w midodrine po. on Levophed now, per CCU   Dobutamine drip as per HF team/CCU

## 2018-06-04 NOTE — PROGRESS NOTE ADULT - SUBJECTIVE AND OBJECTIVE BOX
Patient is a 63y old  Male who presents with a chief complaint of SOB (29 May 2018 15:55)      Any change in ROS:     pt is doing same: has SOB : had dialysis today : 3.5 L REMOVED     MEDICATIONS  (STANDING):  amiodarone    Tablet 100 milliGRAM(s) Oral daily  atorvastatin 80 milliGRAM(s) Oral at bedtime  bisacodyl 5 milliGRAM(s) Oral at bedtime  chlorhexidine 4% Liquid 1 Application(s) Topical <User Schedule>  dextrose 50% Injectable 12.5 Gram(s) IV Push once  dextrose 50% Injectable 25 Gram(s) IV Push once  dextrose 50% Injectable 25 Gram(s) IV Push once  DOBUTamine Infusion 2.5 MICROgram(s)/kG/Min (5.325 mL/Hr) IV Continuous <Continuous>  docusate sodium 100 milliGRAM(s) Oral two times a day  finasteride 5 milliGRAM(s) Oral daily  insulin lispro (HumaLOG) corrective regimen sliding scale   SubCutaneous three times a day before meals  insulin lispro (HumaLOG) corrective regimen sliding scale   SubCutaneous at bedtime  levothyroxine 75 MICROGram(s) Oral daily  norepinephrine Infusion 0.05 MICROgram(s)/kG/Min (3.328 mL/Hr) IV Continuous <Continuous>  pantoprazole    Tablet 40 milliGRAM(s) Oral before breakfast  piperacillin/tazobactam IVPB. 3.375 Gram(s) IV Intermittent every 12 hours  senna 2 Tablet(s) Oral at bedtime  sevelamer hydrochloride 1600 milliGRAM(s) Oral <User Schedule>  sodium chloride 0.65% Nasal 1 Spray(s) Both Nostrils four times a day    MEDICATIONS  (PRN):  ALBUTerol/ipratropium for Nebulization 3 milliLiter(s) Nebulizer every 6 hours PRN Shortness of Breath and/or Wheezing  dextrose 40% Gel 15 Gram(s) Oral once PRN Blood Glucose LESS THAN 70 milliGRAM(s)/deciliter    Vital Signs Last 24 Hrs  T(C): 36.4 (04 Jun 2018 11:56), Max: 37.1 (03 Jun 2018 15:48)  T(F): 97.5 (04 Jun 2018 11:56), Max: 98.7 (03 Jun 2018 15:48)  HR: 61 (04 Jun 2018 12:05) (61 - 109)  BP: 94/57 (04 Jun 2018 11:56) (70/51 - 111/72)  BP(mean): 63 (04 Jun 2018 11:00) (40 - 81)  RR: 16 (04 Jun 2018 11:56) (12 - 28)  SpO2: 100% (04 Jun 2018 12:05) (74% - 100%)    I&O's Summary    03 Jun 2018 07:01  -  04 Jun 2018 07:00  --------------------------------------------------------  IN: 555.8 mL / OUT: 0 mL / NET: 555.8 mL    04 Jun 2018 07:01  -  04 Jun 2018 12:09  --------------------------------------------------------  IN: 400 mL / OUT: 3900 mL / NET: -3500 mL          Physical Exam:   GENERAL: NAD, well-groomed, well-developed  HEENT: BELL/   Atraumatic, Normocephalic  ENMT: No tonsillar erythema, exudates, or enlargement; Moist mucous membranes, Good dentition, No lesions  NECK: Supple, No JVD, Normal thyroid  CHEST/LUNG: persistent crackles bilaterally   CVS: Regular rate and rhythm; No murmurs, rubs, or gallops  GI: : Soft, Nontender, Nondistended; Bowel sounds present  NERVOUS SYSTEM:  Alert & Oriented X3  EXTREMITIES: - edema  LYMPH: No lymphadenopathy noted  SKIN: No rashes or lesions  ENDOCRINOLOGY: No Thyromegaly  PSYCH: Appropriate    Labs:  ABG - ( 04 Jun 2018 04:10 )  pH, Arterial: 7.30  pH, Blood: x     /  pCO2: 45    /  pO2: 123   / HCO3: 20    / Base Excess: -4.4  /  SaO2: 97.6            20, 22, 26, 26, 24, 23, 24, 24, 27, 23                            12.7   15.07 )-----------( 148      ( 04 Jun 2018 04:20 )             39.7                         12.9   25.43 )-----------( 132      ( 03 Jun 2018 05:00 )             42.8                         12.8   17.92 )-----------( 117      ( 02 Jun 2018 05:45 )             40.6                         12.7   11.45 )-----------( 96       ( 01 Jun 2018 02:50 )             41.1     06-04    126<L>  |  88<L>  |  45<H>  ----------------------------<  223<H>  5.2   |  20<L>  |  6.49<H>  06-03    126<L>  |  88<L>  |  34<H>  ----------------------------<  312<H>  4.6   |  21<L>  |  5.38<H>  06-02    130<L>  |  93<L>  |  27<H>  ----------------------------<  199<H>  4.4   |  26  |  4.20<H>  06-01    127<L>  |  89<L>  |  34<H>  ----------------------------<  155<H>  4.9   |  23  |  4.87<H>    Ca    8.8      04 Jun 2018 04:20  Ca    8.8      03 Jun 2018 05:00  Phos  2.9     06-04  Phos  2.7     06-03  Mg     2.3     06-04  Mg     2.2     06-03    TPro  7.8  /  Alb  2.7<L>  /  TBili  0.5  /  DBili  x   /  AST  15  /  ALT  15  /  AlkPhos  194<H>  06-04  TPro  8.1  /  Alb  2.8<L>  /  TBili  0.5  /  DBili  x   /  AST  20  /  ALT  16  /  AlkPhos  205<H>  06-03    CAPILLARY BLOOD GLUCOSE      POCT Blood Glucose.: 162 mg/dL (04 Jun 2018 08:43)  POCT Blood Glucose.: 224 mg/dL (03 Jun 2018 22:18)      LIVER FUNCTIONS - ( 04 Jun 2018 04:20 )  Alb: 2.7 g/dL / Pro: 7.8 g/dL / ALK PHOS: 194 u/L / ALT: 15 u/L / AST: 15 u/L / GGT: x           PT/INR - ( 04 Jun 2018 04:20 )   PT: 81.9 SEC;   INR: 6.85          PTT - ( 04 Jun 2018 04:20 )  PTT:72.6 SEC    Lactate, Blood: 1.3 mmol/L (06-02 @ 10:40)        RECENT CULTURES:  06-02 @ 15:34 BLOOD VENOUS                  NO ORGANISMS ISOLATED  NO ORGANISMS ISOLATED AT 24 HOURS  06-02 @ 10:56 BLOOD-CATHETER                  NO ORGANISMS ISOLATED  NO ORGANISMS ISOLATED AT 48 HRS.        RESPIRATORY CULTURES:          Studies  Chest X-RAY  CT SCAN Chest   Venous Dopplers: LE:   CT Abdomen  Others      < from: CT Chest w/ IV Cont (06.03.18 @ 21:35) >  ******PRELIMINARY REPORT******    ******PRELIMINARY REPORT******            EXAM:  CT CHEST IC        PROCEDURE DATE:  [place]  3 2018     ******PRELIMINARY REPORT******    ******PRELIMINARY REPORT******            INTERPRETATION:  d/w Dr. Ortiz            ******PRELIMINARY REPORT******    ******PRELIMINARY REPORT******          TITI ALLEN M.D., RADIOLOGY RESIDENT    < end of copied text >

## 2018-06-04 NOTE — PROGRESS NOTE ADULT - ATTENDING COMMENTS
events noted: Had a detailed discussion with the sister: Given my office number too: She will get me the official lung biopsy report from [place]:  6/4: As above:

## 2018-06-04 NOTE — PROGRESS NOTE ADULT - ASSESSMENT
63M w severe CHF, on chronic dobutamine gtt at home x 3 years (follows up with Dr. Davis), AFib on coumadin, AICD, ESRD on HD MWF (plus add'l session on Saturday prn), COPD (on home O2), pulmonary fibrosis, presents to the ED with chief complaint of epistaxis.  Here with Acute on Chronic Heart Failure.  Now with worsening leukocytosis and fever requiring pressors - BC to date negative, however, CT abd suggests cholecystitis.      Suggest:  - continue zosyn  - check vanco tomorrow AM  - f/u BC  - HIDA scan   - surgery evaluation pending results    d/w ccu    I will be away at [facility].  ID service to cover starting tomorrow.

## 2018-06-04 NOTE — PROGRESS NOTE ADULT - ASSESSMENT
#ESRD on HD  #Severe NICM on chronic dobutrex. In the past has not tolerated multiple attempts to wean off.  #DM  #ICD  #AF  #COPD  #Fevers workup in progress.  Prognosis guarded.

## 2018-06-04 NOTE — DIETITIAN INITIAL EVALUATION ADULT. - PERTINENT MEDS FT
Lipitor, Humalog, Colace , Senna, Protonix DR, Proscar, Synthroid, Zosyn , Levophed, Dobutamine, Renagel

## 2018-06-04 NOTE — PROGRESS NOTE ADULT - SUBJECTIVE AND OBJECTIVE BOX
Patient seen and examined. He has a non-rebreather on today, satting 88-86%. Getting HD. Has leukocytosis and was febrile, currently on vanco and zosyn.   He is alert, but very fatigued, did not want to talk much.     Medications:  ALBUTerol/ipratropium for Nebulization 3 milliLiter(s) Nebulizer every 6 hours PRN  amiodarone    Tablet 100 milliGRAM(s) Oral daily  atorvastatin 80 milliGRAM(s) Oral at bedtime  bisacodyl 5 milliGRAM(s) Oral at bedtime  chlorhexidine 4% Liquid 1 Application(s) Topical <User Schedule>  dextrose 40% Gel 15 Gram(s) Oral once PRN  dextrose 50% Injectable 12.5 Gram(s) IV Push once  dextrose 50% Injectable 25 Gram(s) IV Push once  dextrose 50% Injectable 25 Gram(s) IV Push once  DOBUTamine Infusion 2.5 MICROgram(s)/kG/Min IV Continuous <Continuous>  docusate sodium 100 milliGRAM(s) Oral two times a day  finasteride 5 milliGRAM(s) Oral daily  insulin lispro (HumaLOG) corrective regimen sliding scale   SubCutaneous three times a day before meals  insulin lispro (HumaLOG) corrective regimen sliding scale   SubCutaneous at bedtime  levothyroxine 75 MICROGram(s) Oral daily  norepinephrine Infusion 0.05 MICROgram(s)/kG/Min IV Continuous <Continuous>  pantoprazole    Tablet 40 milliGRAM(s) Oral before breakfast  piperacillin/tazobactam IVPB. 3.375 Gram(s) IV Intermittent every 12 hours  senna 2 Tablet(s) Oral at bedtime  sevelamer hydrochloride 1600 milliGRAM(s) Oral <User Schedule>  sodium chloride 0.65% Nasal 1 Spray(s) Both Nostrils four times a day      Vitals:  Vital Signs Last 24 Hrs  T(C): 36.4 (2018 07:27), Max: 37.1 (2018 15:48)  T(F): 97.6 (2018 07:27), Max: 98.7 (2018 15:48)  HR: 99 (2018 10:00) (85 - 113)  BP: 70/51 (2018 10:00) (70/51 - 111/72)  BP(mean): 55 (2018 10:00) (40 - 81)  RR: 18 (2018 10:00) (12 - 28)  SpO2: 99% (2018 10:00) (74% - 100%)    Daily     Daily Weight in k.6 (2018 07:27)    I&O's Detail    2018 07:01  -  2018 07:00  --------------------------------------------------------  IN:    DOBUTamine Infusion: 53.5 mL    DOBUTamine Infusion: 37.1 mL    norepinephrine Infusion: 295.2 mL    Oral Fluid: 170 mL  Total IN: 555.8 mL    OUT:  Total OUT: 0 mL    Total NET: 555.8 mL          Physical Exam:     General: No distress. Comfortable.  HEENT: EOM intact.  Neck: Neck supple. JVP mildly elevated. No masses  Chest:Decreased on right base. Remaining clear.   CV: Normal S1 and S2. No murmurs, rub, or gallops. Radial pulses normal. No LE edema b/l.   Abdomen: Soft, non-distended, non-tender  Skin: No rashes or skin breakdown  Neurology: Alert and oriented times three. Sensation intact  Psych: Affect normal    Labs:                        12.7   15.07 )-----------( 148      ( 2018 04:20 )             39.7     06-04    126<L>  |  88<L>  |  45<H>  ----------------------------<  223<H>  5.2   |  20<L>  |  6.49<H>    Ca    8.8      2018 04:20  Phos  2.9     06-04  Mg     2.3     06-04    TPro  7.8  /  Alb  2.7<L>  /  TBili  0.5  /  DBili  x   /  AST  15  /  ALT  15  /  AlkPhos  194<H>  06-04    PT/INR - ( 2018 04:20 )   PT: 81.9 SEC;   INR: 6.85          PTT - ( 2018 04:20 )  PTT:72.6 SEC    < from: TTE with Doppler (w/Cont) (18 @ 08:43) >  DIMENSIONS:  Dimensions:     Normal Values:  LA:     5.2 cm    2.0 - 4.0 cm  Ao:     3.2 cm    2.0 - 3.8 cm  SEPTUM: 1.0 cm    0.6 - 1.2 cm  PWT:    0.9 cm    0.6 - 1.1 cm  LVIDd:  6.0 cm    3.0 - 5.6 cm  LVIDs:  5.6 cm    1.8 - 4.0 cm  Derived Variables:  LVMI: 122 g/m2  RWT: 0.30  Fractional short: 7 %  Ejection Fraction (Teicholtz): 15 %  ------------------------------------------------------------------------  OBSERVATIONS:  Mitral Valve: Normal mitral valve. Mild mitral  regurgitation.  Aortic Root: Normal aortic root.  Aortic Valve: Normal trileaflet aortic valve. Peak left  ventricular outflow tract gradient equals 3.2 mm Hg, mean  gradient is equal to 1 mm Hg, LVOT velocity time integral  equals 16 cm.  Left Atrium: Mildly dilated left atrium.  LA volume index =  40 cc/m2.  Left Ventricle: Severe global left ventricular systolic  dysfunction.   Endocardial visualization enhanced with  intravenous injection of echo contrast (Definity). No left  ventricular thrombus.   Septal motion consistent with right  ventricular overload. Eccentric left ventricular  hypertrophy (dilated left ventricle with normal relative  wall thickness). (DT:449 ms).  Right Heart: Severe right atrial enlargement.   A device  wire is noted in the right heart. Right ventricular  enlargement with decreased right ventricular systolic  function. Normal tricuspid valve.  Severe tricuspid  regurgitation. Normal pulmonic valve. Mild pulmonic  regurgitation.  Pericardium/PleuraSmall pericardial effusion.  Hemodynamic: Estimated right ventricular systolic pressure  equals 56 mm Hg, assuming right atrial pressure equals 10  mm Hg, consistent with moderate pulmonary hypertension.    < end of copied text > Patient seen and examined. He has a non-rebreather on today, satting 88-86%. Getting HD. Has leukocytosis and was febrile, currently on vanco and zosyn. He is alert, but very fatigued, did not want to talk much.     Medications:  ALBUTerol/ipratropium for Nebulization 3 milliLiter(s) Nebulizer every 6 hours PRN  amiodarone    Tablet 100 milliGRAM(s) Oral daily  atorvastatin 80 milliGRAM(s) Oral at bedtime  bisacodyl 5 milliGRAM(s) Oral at bedtime  chlorhexidine 4% Liquid 1 Application(s) Topical <User Schedule>  dextrose 40% Gel 15 Gram(s) Oral once PRN  dextrose 50% Injectable 12.5 Gram(s) IV Push once  dextrose 50% Injectable 25 Gram(s) IV Push once  dextrose 50% Injectable 25 Gram(s) IV Push once  DOBUTamine Infusion 2.5 MICROgram(s)/kG/Min IV Continuous <Continuous>  docusate sodium 100 milliGRAM(s) Oral two times a day  finasteride 5 milliGRAM(s) Oral daily  insulin lispro (HumaLOG) corrective regimen sliding scale   SubCutaneous three times a day before meals  insulin lispro (HumaLOG) corrective regimen sliding scale   SubCutaneous at bedtime  levothyroxine 75 MICROGram(s) Oral daily  norepinephrine Infusion 0.05 MICROgram(s)/kG/Min IV Continuous <Continuous>  pantoprazole    Tablet 40 milliGRAM(s) Oral before breakfast  piperacillin/tazobactam IVPB. 3.375 Gram(s) IV Intermittent every 12 hours  senna 2 Tablet(s) Oral at bedtime  sevelamer hydrochloride 1600 milliGRAM(s) Oral <User Schedule>  sodium chloride 0.65% Nasal 1 Spray(s) Both Nostrils four times a day      Vitals:  Vital Signs Last 24 Hrs  T(C): 36.4 (2018 07:27), Max: 37.1 (2018 15:48)  T(F): 97.6 (2018 07:27), Max: 98.7 (2018 15:48)  HR: 99 (2018 10:00) (85 - 113)  BP: 70/51 (2018 10:00) (70/51 - 111/72)  BP(mean): 55 (2018 10:00) (40 - 81)  RR: 18 (2018 10:00) (12 - 28)  SpO2: 99% (2018 10:00) (74% - 100%)    Daily     Daily Weight in k.6 (2018 07:27)    I&O's Detail    2018 07:01  -  2018 07:00  --------------------------------------------------------  IN:    DOBUTamine Infusion: 53.5 mL    DOBUTamine Infusion: 37.1 mL    norepinephrine Infusion: 295.2 mL    Oral Fluid: 170 mL  Total IN: 555.8 mL    OUT:  Total OUT: 0 mL    Total NET: 555.8 mL          Physical Exam:     General: No distress. Comfortable.  HEENT: EOM intact.  Neck: Neck supple. JVP mildly elevated. No masses  Chest:Decreased on right base. Remaining clear.   CV: Normal S1 and S2. No murmurs, rub, or gallops. Radial pulses normal. No LE edema b/l.   Abdomen: Soft, non-distended, non-tender  Skin: No rashes or skin breakdown  Neurology: Alert and oriented times three. Sensation intact  Psych: Affect normal    Labs:                        12.7   15.07 )-----------( 148      ( 2018 04:20 )             39.7     06-04    126<L>  |  88<L>  |  45<H>  ----------------------------<  223<H>  5.2   |  20<L>  |  6.49<H>    Ca    8.8      2018 04:20  Phos  2.9     06-04  Mg     2.3     06-04    TPro  7.8  /  Alb  2.7<L>  /  TBili  0.5  /  DBili  x   /  AST  15  /  ALT  15  /  AlkPhos  194<H>  06-04    PT/INR - ( 2018 04:20 )   PT: 81.9 SEC;   INR: 6.85          PTT - ( 2018 04:20 )  PTT:72.6 SEC    < from: TTE with Doppler (w/Cont) (18 @ 08:43) >  DIMENSIONS:  Dimensions:     Normal Values:  LA:     5.2 cm    2.0 - 4.0 cm  Ao:     3.2 cm    2.0 - 3.8 cm  SEPTUM: 1.0 cm    0.6 - 1.2 cm  PWT:    0.9 cm    0.6 - 1.1 cm  LVIDd:  6.0 cm    3.0 - 5.6 cm  LVIDs:  5.6 cm    1.8 - 4.0 cm  Derived Variables:  LVMI: 122 g/m2  RWT: 0.30  Fractional short: 7 %  Ejection Fraction (Teicholtz): 15 %  ------------------------------------------------------------------------  OBSERVATIONS:  Mitral Valve: Normal mitral valve. Mild mitral  regurgitation.  Aortic Root: Normal aortic root.  Aortic Valve: Normal trileaflet aortic valve. Peak left  ventricular outflow tract gradient equals 3.2 mm Hg, mean  gradient is equal to 1 mm Hg, LVOT velocity time integral  equals 16 cm.  Left Atrium: Mildly dilated left atrium.  LA volume index =  40 cc/m2.  Left Ventricle: Severe global left ventricular systolic  dysfunction.   Endocardial visualization enhanced with  intravenous injection of echo contrast (Definity). No left  ventricular thrombus.   Septal motion consistent with right  ventricular overload. Eccentric left ventricular  hypertrophy (dilated left ventricle with normal relative  wall thickness). (DT:449 ms).  Right Heart: Severe right atrial enlargement.   A device  wire is noted in the right heart. Right ventricular  enlargement with decreased right ventricular systolic  function. Normal tricuspid valve.  Severe tricuspid  regurgitation. Normal pulmonic valve. Mild pulmonic  regurgitation.  Pericardium/PleuraSmall pericardial effusion.  Hemodynamic: Estimated right ventricular systolic pressure  equals 56 mm Hg, assuming right atrial pressure equals 10  mm Hg, consistent with moderate pulmonary hypertension.    < end of copied text >

## 2018-06-04 NOTE — PROGRESS NOTE ADULT - PROBLEM SELECTOR PLAN 1
secondary to a combination of ILD as well as chf: ? Pt is using cpap/ bipap at home: The compliance report is given to the resident on floor today: Pt has not been complaint with machine at home: He used it only for 6 days and that too, for less then 2 hours. Called his wife to get more information, no answer: Left message to call my office. Pt was discharged on bipap last time : Can restart bipap at 12/5 with 40% fio2 in the night while sleeping and prn in the daytime:  : doig same: feels a shade better: can start bipap as mentioned above at night time while sleepin/1: his SOB ismoslty related to his poor LV function: He has ILD , and he has harris tried on steroids before with no improvement in his symptoms and hence they were dced: This was confirmed again with the wife: He was again tried on steroids on last admission with no significant improvement in his breathing: Further his symptoms improve with better management of  his chf / fluid overload status: At this time would cont current treatment without steroids: He can cont on bipap at night and his compliance at home is pretty poor: Explained to him again as well as his sister and wife that he need to be complaint with his bipap machine at home: They understand!   SOB (+), not tachypneic.  on nasal cannular used bipap last night. on Dobutamine drip. leukocytosis (+) slightly elevated temp. Repeat CXR  6/3 no SOB, no tachypnea. on bipap for non compensated respiratory acidosis. 7.27/54/125/22. current bipap order is 12/5 40%. recommend titrate FIO2 to keep O2 sat between 88 to 92%.  : pt is on 50% fio2: Cont to have combined resp as well as metabolic acidosis: uses bipap at night: New ct chest reviewed: has underlying ILD as well as small to moderate effusion on the right side: Which is likely alexys to Cardiomyopathy:

## 2018-06-04 NOTE — PROGRESS NOTE ADULT - SUBJECTIVE AND OBJECTIVE BOX
cc: fever    f/u fever/sepsis/shock    interval history/ROS:  seen during HD; does not verbalize any complaints.  CT with ? cholecystitis.  Unable to obtain ROS.    PAST MEDICAL & SURGICAL HISTORY:  Seizure: Off Keppra since 7/2017  Chronic hypotension  AF (atrial fibrillation)  COPD (chronic obstructive pulmonary disease): 4L home O2  HLD (hyperlipidemia)  DM (diabetes mellitus): Off Insulin since 7/2017  ESRD (end stage renal disease) on dialysis  BPH (benign prostatic hypertrophy)  Myocardial infarction: 10/2011  Gout  CHF (congestive heart failure)  AICD (automatic cardioverter/defibrillator) present: Biot[employer] - placed 9/11/09  H/O coronary angiogram    Allergies  No Known Allergies    ANTIMICROBIALS:    vancomycin x1 6/2 and 6/4  piperacillin/tazobactam IVPB. 3.375 every 12 hours (6/2-)    MEDICATIONS  (STANDING):  amiodarone    Tablet 100 daily  atorvastatin 80 at bedtime  bisacodyl 5 at bedtime  DOBUTamine Infusion 2.5 <Continuous>  docusate sodium 100 two times a day  finasteride 5 daily  insulin lispro (HumaLOG) corrective regimen sliding scale  three times a day before meals  insulin lispro (HumaLOG) corrective regimen sliding scale  at bedtime  levothyroxine 75 daily  norepinephrine Infusion 0.05 <Continuous>  pantoprazole    Tablet 40 before breakfast  senna 2 at bedtime    Vital Signs Last 24 Hrs  T(F): 97.6 (06-04-18 @ 13:00), Max: 98.7 (06-03-18 @ 15:48)  HR: 61 (06-04-18 @ 12:05)  BP: 94/57 (06-04-18 @ 11:56)  RR: 16 (06-04-18 @ 11:56)  SpO2: 100% (06-04-18 @ 12:05) (74% - 100%)  Wt(kg): --    PHYSICAL EXAM:  General: ill appearing  HEAD/EYES: anicteric,  ENT:  supple  Cardiovascular:   S1, S2; irreg  Respiratory:  crackles  GI:  soft, R tender  :  rosa  Musculoskeletal:  no synovitis  Neurologic:  grossly non-focal but not responding to commands  Skin:  no rash  Lymph: no lymphadenopathy  Psychiatric:  awake but difficult to assess  Vascular:  L PICC; R Permacath; piv okay             WBC Count: 15.07 (06-04-18 @ 04:20)  WBC Count: 25.43 (06-03-18 @ 05:00)  WBC Count: 17.92 (06-02-18 @ 05:45)  WBC Count: 11.45 (06-01-18 @ 02:50)  WBC Count: 6.95 (05-31-18 @ 05:07)  WBC Count: 5.88 (05-30-18 @ 05:45)  WBC Count: 6.79 (05-29-18 @ 04:00)               12.7   15.07 )-----------( 148      ( 04 Jun 2018 04:20 )             39.7 06-04    126  |  88  |  45  ----------------------------<  223  5.2   |  20  |  6.49  Ca    8.8      04 Jun 2018 04:20Phos  2.9     06-04Mg     2.3     06-04  TPro  7.8  /  Alb  2.7  /  TBili  0.5  /  DBili  x   /  AST  15  /  ALT  15  /  AlkPhos  194  06-04  MICROBIOLOGY:  blood culture  sent from the catheter, and peripheral source negative so far    RADIOLOGY:  CT Abdomen and Pelvis w/ IV Cont (06.03.18 @ 21:40) >  IMPRESSION:  Small right pleural effusion which has increased in size since 3/28/2018.  Interstitial lung disease with ground glass opacities which have increased slightly in density since 3/22/2018.  Distended gallbladder with wall thickening and sludge or small stones, with nonspecific pericholecystic fluid. These findings can be seen in the   setting of acute cholecystitis. Ultrasound or HIDA scan is recommended for further evaluation.  Small splenic infarct.  Rounded low density region within the right internal jugular vein above a central venous catheter which could represent a nonocclusive clot or fibrin sheath.    Xray Chest 1 View- PORTABLE-Routine (06.02.18 @ 08:55) >  IMPRESSION:  low lung volumes with diffusely increased lung markings again noted related to chronic interstitial disease, however, component of superimposed edema and/or infection cannot be excluded.  Persistent right pleural reaction. No gross left effusion.    No pneumothorax.  Stable left chest wall AICD and cardiac and prominent appearing cardiac and mediastinal silhouettes.  Tunneled right chest wall dual-lumen dialysis catheter with tips in cavoatrial region and left PICC with tip in SVC region again noted.  Previously seen distally inserted Lydia-Chris catheter has since been removed. No appreciable retained catheter elements.

## 2018-06-04 NOTE — PROGRESS NOTE ADULT - PROBLEM SELECTOR PLAN 2
per cardiology  5/31: on dobutamine as well as HD  6/1: Cont current care:  per CHF team  6/2 On Dobutamine drip. management per CHF team  6/3 on Dobutamine drip. Monitor strict I&O.  keep negative. hyponatremia is worsening and  but also Pt is on Dobutamine!  6/4: developed new effusion on the right side: on dobutamine: Likely the ple effusion is due to chf

## 2018-06-04 NOTE — DIETITIAN INITIAL EVALUATION ADULT. - DIET TYPE
1200ml/DASH/TLC (sodium and cholesterol restricted diet)/renal replacement pts:no protein restr,no conc K & phos, low sodium

## 2018-06-04 NOTE — PROCEDURE NOTE - NSPROCDETAILS_GEN_ALL_CORE
hemostasis with direct pressure, dressing applied/Seldinger technique/all materials/supplies accounted for at end of procedure/positive blood return obtained via catheter/sutured in place/location identified, draped/prepped, sterile technique used, needle inserted/introduced/connected to a pressurized flush line

## 2018-06-04 NOTE — PROGRESS NOTE ADULT - ASSESSMENT
63yM w severe HFrEF (on dobutamine gtt x3 yrs) w [employer] AICD, A-fib (on warfarin), ESRD on HD MWF (+Sat PRN), pulmonary fibrosis (on home O2), on  p/w epistaxis x1 day, also w acute-on-chronic SOB, found to be volume-overloaded despite reported med compliance, no dietary changes, and having gotten extra session of HD the day prior to admission. SBP 80s-90s in UEs, so in order to be monitored while undergoing fluid removal by HD/UF, was transferred to CCU.     Since admission, dyspnea improved.  gtt has been adjusted from 7.5 as low as 2.5, based on RHC measurements ([place] in place -). Course c/b abdominal pain and nausea, rising WBC, rectal temp >101, hypotension; BCx neg to date. Diff Dx for sepsis includes cholecystitis (favored by R-sided pain and CT A/P, disfavored by LFTs wnl, US RUQ pending), gut translocation of GI shelley (favored by R-sided pain), pneumonia (favored by ?worsening CXR and by worsening resp status), central-line-associated bloodstream infection (favored by temporal association w presence of R femoral line).      # Neuro - no active issues    # ENT - epistaxis, resolved; attachment for home humidification of nasal cannula sent to patient's home  --- continue humidification of nasal cannula, nasal saline spray  --- facilitate patient's getting simple mask to use for supplemental O2 at home    # Cardiac  - Severe bi-ventricular systolic heart failure w AICD in place, on chronic dobutamine gtt 7.5, LVEF 15% this admission.    --- No improvement in symptoms with dobutamine at 5 mcg overnight, decrease to 2.5.  --- remove femoral introducer (had been left in place even after [place] removed)  --- Give midodrine 10mg one hour before HD, not otherwise   --- appreciate Heart-Failure recs  --- daily weights: standing weights if possible  - Atrial fibrillation, on home warfarin 3mg QD  --- cont amiodarone 100mg QD  --- check INR QD, dose warfarin QD  ------- INR 6/3 supratherapeutic to >6 in the setting of no bleeding. Hold warfarin, no reversal unless bleeding.  - Hyperlipidemia --- cont atorvastatin 80 daily  - BP discrepancy between UEs vs LEs  --- BP in lower extremities is significantly higher than in upper extremities, consistent w prior CCU admission; we think that LE BP is likely more accurate than UE BP. F/u VA duplex of UEs did not reveal any significant arterial stenosis.   - MACE risk reduction --- cont ASA  - Concern for L to R shunt on old TTE  - New onset sepsis c/b hypotension requiring levophed, unresponsive to IVF bolus.     # Pulm  - Hypoxia, likely multifactorial --- c/w nasal cannula to keep SpO2 >90%  - Pulmonary fibrosis  --- per pulm, do not anticipate benefit from steroids based on unsuccessful trial in past  - ALONZO --- BiLevel at night and PRN, patient minimally compliant with worsening mixed respiratory and metabolic acidosis.    # GI  - Abdominal pain, R-sided, achy, associated w tenderness to palpation  --- onset coincides w weaning of dobutamine, but thought not likely due to hypoperfusion given that CO and CI still adequate,   --- cont newly started PPI QD  --- order simethicone if needed for gas  --- order acetaminophen if needed for pain  --- avoiding Maalox and other aluminum-hydroxide-containing products due to ESRD   - Nausea and vomiting  --- order ondansetron and/or metoclopramide if needed   --- abdxray with non-obstructive gas pattern, no free air.  - Constipation --- cont senna, bisacodyl, docusate; add'l agents PRN    #  - BPH   --- cont finasteride  --- clarify whether pt needs finasteride given that he is anuric    # Renal - ESRD on HD  --- C/w HD qMWF, as well as Saturday prn and add'l fluid-removal PRN  --- C/w Carrie-Tiffanie, Sevelamer  --- appreciate nephrology recs  --- may need removal of tunneled catheter and chronic PICC in the setting of sepsis.    # Endocr  - Diabetes mellitus type 2  --- off insulin for years  --- monitor glucose on BMP and blood gases  - Hypothyroidism  --- cont levothyroxine    # Infectious Disease   - Leukocytosis worsening in the setting of rectal temperature O/N to 101.4.  - 2 x peripheral blood cultures pending. Vancomycin by level. Zosyn dosed renally.  - ID consult pending in the setting of chronic PICC and right sided tunneled cath for dialysis.  - HBV vaccination not up to date --- consider HBV vaccination this admission    # VTE ppx: warfarin held in the setting of supra therapeutic INR.   # Dispo: CCU  # GOC: full code, discussed multiple times this admission and in past, including with palliative care    Good Garza MD  [facility] Internal Medicine   Pager # [phone]/ [phone] 63yM w severe HFrEF (on dobutamine gtt x3 yrs) w [employer] AICD, A-fib (on warfarin), ESRD on HD MWF (+Sat PRN), pulmonary fibrosis (on home O2), on  p/w epistaxis x1 day, also w acute-on-chronic SOB, found to be volume-overloaded despite reported med compliance, no dietary changes, and having gotten extra session of HD the day prior to admission. SBP 80s-90s in UEs, so in order to be monitored while undergoing fluid removal by HD/UF, was transferred to CCU.     Since admission, dyspnea improved.  gtt has been adjusted from 7.5 as low as 2.5, based on RHC measurements ([place] in place -). Course c/b abdominal pain and nausea, rising WBC, rectal temp >101, hypotension; BCx neg to date. Diff Dx for sepsis includes cholecystitis (favored by R-sided pain+tenderness and CT A/P, disfavored by LFTs wnl, US RUQ pending), gut translocation of GI shelley (favored by R-sided pain), pneumonia (favored by worsening CT chest and by worsening resp status), and/or central-line-associated bloodstream infection (favored by temporal association w presence of R femoral line).      # Neuro - no active issues    # ENT - epistaxis, resolved; attachment for home humidification of nasal cannula sent to patient's home  --- continue humidification of nasal cannula, nasal saline spray  --- facilitate patient's getting simple mask to use for supplemental O2 at home    # Cardiac  - Severe bi-ventricular systolic heart failure w AICD in place, on chronic dobutamine gtt 7.5, LVEF 15% this admission.    --- Cont dobutamine 2.5.  --- remove femoral introducer (had been left in place even after [place] removed)  --- Give midodrine 10mg one hour before HD, not otherwise   --- appreciate Heart-Failure recs  --- daily weights: standing weights if possible  - Hypotension in setting of sepsis  --- cont norepinephrine gtt, wean as tolerated  - Atrial fibrillation, on home warfarin 3mg QD  --- cont amiodarone 100mg QD  --- check INR QD, dose warfarin QD  ------- INR 6/3 supratherapeutic to >6 in the setting of no bleeding. Hold warfarin, no reversal unless bleeding.  --- cont newly started PPI QD for GI protection while INR supratherapeutic  - Hyperlipidemia --- cont atorvastatin 80 daily  - BP discrepancy between UEs vs LEs  --- BP in lower extremities is significantly higher than in upper extremities, consistent w prior CCU admission; we think that LE BP is likely more accurate than UE BP. F/u VA duplex of UEs did not reveal any significant arterial stenosis.   - RIJ non-occlusive clot vs fibrin sheath associated w Perma-cath, seen on CT /3 --- monitor clinically  - Splenic infarct seen on CT 6/3, potentially to atrial fibrillation and recently sub-therapeutic INR --- supportive care, monitor clinically  - MACE risk reduction --- cont ASA  - Concern for L to R shunt on old TTE, but no evidence of shunt on recent RHC --- nothing to do      # Pulm  - Hypoxia, likely multifactorial --- c/w Venturi mask to keep SpO2 >90%, try to wean back to nasal cannula / face mask  - Pulmonary fibrosis  --- per pulm, do not anticipate benefit from steroids based on unsuccessful trial in past  - ALONZO --- BiLevel at night and PRN, patient minimally compliant with worsening mixed respiratory and metabolic acidosis.  - Pleural effusion, R-sided --- increased from prior CT, now small-to-moderate    # GI  - Probable cholecystitis -- Abdominal pain, R-sided, achy, associated w tenderness to palpation, CT A/P c/f cholecystitis  --- US RUQ  --- consider HIDA  --- onset coincides w weaning of dobutamine, but thought not likely due to hypoperfusion given that CO and CI were still adequate on multiple measurements   --- order simethicone if needed for gas  --- order acetaminophen if needed for pain  --- avoiding Maalox and other aluminum-hydroxide-containing products due to ESRD   - Nausea and vomiting  --- order ondansetron and/or metoclopramide if needed   --- abd xray with non-obstructive gas pattern, no free air.  - Constipation --- cont senna, bisacodyl, docusate; add'l agents PRN    #  - BPH   --- cont finasteride  --- clarify whether pt needs finasteride given that he is anuric    # Renal - ESRD on HD  --- C/w HD qMWF, as well as Saturday prn and add'l fluid-removal PRN  --- C/w Carrie-Tiffanie, Sevelamer  --- appreciate nephrology recs  --- may need removal of tunneled catheter and chronic PICC in the setting of sepsis.    # Endocr  - Diabetes mellitus type 2  --- off insulin for years  --- monitor glucose on BMP and blood gases  - Hypothyroidism  --- cont levothyroxine    # Infectious Disease   - Sepsis (rectal fever, hi WBC, low BP): diff dx includes acute cholecystitis, pneumonia, diverticulitis/gut translocation, and/or CLABSI   --- cont vanc by level  --- cont pip/tazo, renally adjusted  --- f/u BCx (NGTD)  --- appreciate ID recs  - HBV vaccination not up to date --- consider HBV vaccination this admission    # VTE ppx: warfarin held in the setting of supra therapeutic INR.   # Dispo: CCU  # GOC: full code, discussed multiple times this admission and in past, including with palliative care

## 2018-06-04 NOTE — PROGRESS NOTE ADULT - ASSESSMENT
64 y/o male w/ PMHX of MI in past (Per patient, [facility]) no stents, HFrEF (LVEF 15%, LVIDd 6.0 cm, w/ AICD) severe TR, on chronic home dobutamine 7.5 mcg/kg/min,  ESRD on HD (3-4 x a week), HLD, DM II, a.fib on coumadin, interstitial pulmonary lung disease on chronic home O2, hypotension on midodrine comes in with epistaxis. Epistaxis is now resolved. He was found to be hypervolemic, and was given UF the same night, and HD the next day. He will have HD today as well. He has been admitted 6 times this year for various reasons, but mostly for SOB.  RHC 17 on 2.5 mcg/kg/min of dobutamine 2.5 mcg/kg/min showed RA 25, PCWP 25, PA 61/29/39, PA sat 42.8%, CI 1.65, CO 3.60, PVR 3.84. Currently He is on  7.5 mcg/kg/min. He admits to SOB at rest sometimes, but mostly with minimal exertion (typically walking 10 steps), orthopnea. No CP, palpitations, dizziness or syncope. ICD does not reveal any events. Patient had RHC and showed RA 14-20, PCWP 22, PA 44/18/28, AO sat 92%, PA sat 58.3%, CO 4.66, CI 2.46, -based on these numberse, Dobutamine was decreased to 5 mcg/kg/min on 18. Hemodynamics on - CVP 8, PA sat 63.6%,CO 5.25, CI 2.76. Dobutamine was further decreased to 2.5 mcg/kg/min, but patient experienced abdominal pain, dobutamine was increased back to 5 mcg/kg/min on  evening. Dobutamine was further weaned down to 2.5 mcg/kg/min. Abdominal pain was investigated via CT abdomen, pelvis-possible acute cholecystitis suspected. Pt was febrile and had leukocytosis, emperic coverage w/ vanco and Zosyn initiated. HD continued.

## 2018-06-05 DIAGNOSIS — R57.9 SHOCK, UNSPECIFIED: ICD-10-CM

## 2018-06-05 LAB
ALBUMIN SERPL ELPH-MCNC: 3 G/DL — LOW (ref 3.3–5)
ALP SERPL-CCNC: 224 U/L — HIGH (ref 40–120)
ALT FLD-CCNC: 18 U/L — SIGNIFICANT CHANGE UP (ref 4–41)
APTT BLD: 65 SEC — HIGH (ref 27.5–37.4)
AST SERPL-CCNC: 20 U/L — SIGNIFICANT CHANGE UP (ref 4–40)
BASE EXCESS BLDA CALC-SCNC: -2.2 MMOL/L — SIGNIFICANT CHANGE UP
BILIRUB SERPL-MCNC: 0.6 MG/DL — SIGNIFICANT CHANGE UP (ref 0.2–1.2)
BUN SERPL-MCNC: 29 MG/DL — HIGH (ref 7–23)
CALCIUM SERPL-MCNC: 8.7 MG/DL — SIGNIFICANT CHANGE UP (ref 8.4–10.5)
CHLORIDE BLDA-SCNC: 98 MMOL/L — SIGNIFICANT CHANGE UP (ref 96–108)
CHLORIDE SERPL-SCNC: 88 MMOL/L — LOW (ref 98–107)
CO2 SERPL-SCNC: 22 MMOL/L — SIGNIFICANT CHANGE UP (ref 22–31)
CREAT SERPL-MCNC: 4.58 MG/DL — HIGH (ref 0.5–1.3)
GLUCOSE BLDA-MCNC: 289 MG/DL — HIGH (ref 70–99)
GLUCOSE SERPL-MCNC: 265 MG/DL — HIGH (ref 70–99)
HCO3 BLDA-SCNC: 22 MMOL/L — SIGNIFICANT CHANGE UP (ref 22–26)
HCT VFR BLD CALC: 39.6 % — SIGNIFICANT CHANGE UP (ref 39–50)
HCT VFR BLDA CALC: 40 % — SIGNIFICANT CHANGE UP (ref 39–51)
HGB BLD-MCNC: 12.4 G/DL — LOW (ref 13–17)
HGB BLDA-MCNC: 13 G/DL — SIGNIFICANT CHANGE UP (ref 13–17)
INR BLD: 7.37 — CRITICAL HIGH (ref 0.88–1.17)
LACTATE BLDA-SCNC: 1.4 MMOL/L — SIGNIFICANT CHANGE UP (ref 0.5–2)
MAGNESIUM SERPL-MCNC: 2.2 MG/DL — SIGNIFICANT CHANGE UP (ref 1.6–2.6)
MCHC RBC-ENTMCNC: 31.2 PG — SIGNIFICANT CHANGE UP (ref 27–34)
MCHC RBC-ENTMCNC: 31.3 % — LOW (ref 32–36)
MCV RBC AUTO: 99.5 FL — SIGNIFICANT CHANGE UP (ref 80–100)
NRBC # FLD: 0 — SIGNIFICANT CHANGE UP
PCO2 BLDA: 53 MMHG — HIGH (ref 35–48)
PH BLDA: 7.28 PH — LOW (ref 7.35–7.45)
PHOSPHATE SERPL-MCNC: 2.8 MG/DL — SIGNIFICANT CHANGE UP (ref 2.5–4.5)
PLATELET # BLD AUTO: 160 K/UL — SIGNIFICANT CHANGE UP (ref 150–400)
PMV BLD: 11.8 FL — SIGNIFICANT CHANGE UP (ref 7–13)
PO2 BLDA: 106 MMHG — SIGNIFICANT CHANGE UP (ref 83–108)
POTASSIUM BLDA-SCNC: 4.2 MMOL/L — SIGNIFICANT CHANGE UP (ref 3.4–4.5)
POTASSIUM SERPL-MCNC: 4.4 MMOL/L — SIGNIFICANT CHANGE UP (ref 3.5–5.3)
POTASSIUM SERPL-SCNC: 4.4 MMOL/L — SIGNIFICANT CHANGE UP (ref 3.5–5.3)
PROCALCITONIN SERPL-MCNC: 8.77 NG/ML — HIGH (ref 0.02–0.1)
PROT SERPL-MCNC: 8.2 G/DL — SIGNIFICANT CHANGE UP (ref 6–8.3)
PROTHROM AB SERPL-ACNC: 85.2 SEC — HIGH (ref 9.8–13.1)
RBC # BLD: 3.98 M/UL — LOW (ref 4.2–5.8)
RBC # FLD: 15.1 % — HIGH (ref 10.3–14.5)
SAO2 % BLDA: 96.8 % — SIGNIFICANT CHANGE UP (ref 95–99)
SODIUM BLDA-SCNC: 126 MMOL/L — LOW (ref 136–146)
SODIUM SERPL-SCNC: 128 MMOL/L — LOW (ref 135–145)
SPECIMEN SOURCE: SIGNIFICANT CHANGE UP
WBC # BLD: 15.19 K/UL — HIGH (ref 3.8–10.5)
WBC # FLD AUTO: 15.19 K/UL — HIGH (ref 3.8–10.5)

## 2018-06-05 PROCEDURE — 99232 SBSQ HOSP IP/OBS MODERATE 35: CPT

## 2018-06-05 PROCEDURE — 99233 SBSQ HOSP IP/OBS HIGH 50: CPT

## 2018-06-05 RX ORDER — SALIVA SUBSTITUTE COMB NO.11 351 MG
5 POWDER IN PACKET (EA) MUCOUS MEMBRANE
Qty: 0 | Refills: 0 | Status: DISCONTINUED | OUTPATIENT
Start: 2018-06-05 | End: 2018-06-09

## 2018-06-05 RX ORDER — MIDODRINE HYDROCHLORIDE 2.5 MG/1
10 TABLET ORAL ONCE
Qty: 0 | Refills: 0 | Status: COMPLETED | OUTPATIENT
Start: 2018-06-05 | End: 2018-06-05

## 2018-06-05 RX ORDER — MIDODRINE HYDROCHLORIDE 2.5 MG/1
10 TABLET ORAL THREE TIMES A DAY
Qty: 0 | Refills: 0 | Status: DISCONTINUED | OUTPATIENT
Start: 2018-06-06 | End: 2018-06-06

## 2018-06-05 RX ORDER — NOREPINEPHRINE BITARTRATE/D5W 8 MG/250ML
1.2 PLASTIC BAG, INJECTION (ML) INTRAVENOUS
Qty: 32 | Refills: 0 | Status: DISCONTINUED | OUTPATIENT
Start: 2018-06-05 | End: 2018-06-09

## 2018-06-05 RX ADMIN — MIDODRINE HYDROCHLORIDE 10 MILLIGRAM(S): 2.5 TABLET ORAL at 21:08

## 2018-06-05 RX ADMIN — Medication 75 MICROGRAM(S): at 06:17

## 2018-06-05 RX ADMIN — Medication 100 MILLIGRAM(S): at 18:28

## 2018-06-05 RX ADMIN — Medication 5: at 18:28

## 2018-06-05 RX ADMIN — FINASTERIDE 5 MILLIGRAM(S): 5 TABLET, FILM COATED ORAL at 12:32

## 2018-06-05 RX ADMIN — Medication 5 MILLIGRAM(S): at 21:54

## 2018-06-05 RX ADMIN — PIPERACILLIN AND TAZOBACTAM 25 GRAM(S): 4; .5 INJECTION, POWDER, LYOPHILIZED, FOR SOLUTION INTRAVENOUS at 06:17

## 2018-06-05 RX ADMIN — ATORVASTATIN CALCIUM 80 MILLIGRAM(S): 80 TABLET, FILM COATED ORAL at 21:54

## 2018-06-05 RX ADMIN — AMIODARONE HYDROCHLORIDE 100 MILLIGRAM(S): 400 TABLET ORAL at 06:17

## 2018-06-05 RX ADMIN — Medication 3.33 MICROGRAM(S)/KG/MIN: at 07:38

## 2018-06-05 RX ADMIN — Medication 4: at 09:03

## 2018-06-05 RX ADMIN — PIPERACILLIN AND TAZOBACTAM 25 GRAM(S): 4; .5 INJECTION, POWDER, LYOPHILIZED, FOR SOLUTION INTRAVENOUS at 18:29

## 2018-06-05 RX ADMIN — Medication 5: at 12:31

## 2018-06-05 RX ADMIN — Medication 1 SPRAY(S): at 18:28

## 2018-06-05 RX ADMIN — Medication 79.88 MICROGRAM(S)/KG/MIN: at 13:53

## 2018-06-05 RX ADMIN — VASOPRESSIN 2.4 UNIT(S)/MIN: 20 INJECTION INTRAVENOUS at 07:39

## 2018-06-05 RX ADMIN — Medication 1 SPRAY(S): at 06:18

## 2018-06-05 RX ADMIN — Medication 15.97 MICROGRAM(S)/KG/MIN: at 09:40

## 2018-06-05 RX ADMIN — Medication 100 MILLIGRAM(S): at 06:17

## 2018-06-05 RX ADMIN — PANTOPRAZOLE SODIUM 40 MILLIGRAM(S): 20 TABLET, DELAYED RELEASE ORAL at 06:17

## 2018-06-05 RX ADMIN — SEVELAMER CARBONATE 1600 MILLIGRAM(S): 2400 POWDER, FOR SUSPENSION ORAL at 12:32

## 2018-06-05 RX ADMIN — SENNA PLUS 2 TABLET(S): 8.6 TABLET ORAL at 21:54

## 2018-06-05 RX ADMIN — SEVELAMER CARBONATE 1600 MILLIGRAM(S): 2400 POWDER, FOR SUSPENSION ORAL at 18:28

## 2018-06-05 RX ADMIN — SEVELAMER CARBONATE 1600 MILLIGRAM(S): 2400 POWDER, FOR SUSPENSION ORAL at 09:22

## 2018-06-05 RX ADMIN — Medication 1 SPRAY(S): at 12:49

## 2018-06-05 RX ADMIN — Medication 15.97 MICROGRAM(S)/KG/MIN: at 07:39

## 2018-06-05 RX ADMIN — CHLORHEXIDINE GLUCONATE 1 APPLICATION(S): 213 SOLUTION TOPICAL at 12:32

## 2018-06-05 NOTE — PROGRESS NOTE ADULT - SUBJECTIVE AND OBJECTIVE BOX
Follow Up:      Inverval History/ROS:Patient is a 63y old  Male who presents with a chief complaint of SOB (29 May 2018 15:55)    Denies abdominal pain. Tolerating po.     Allergies    No Known Allergies    Intolerances        ANTIMICROBIALS:  piperacillin/tazobactam IVPB. 3.375 every 12 hours      OTHER MEDS:  ALBUTerol/ipratropium for Nebulization 3 milliLiter(s) Nebulizer every 6 hours PRN  amiodarone    Tablet 100 milliGRAM(s) Oral daily  atorvastatin 80 milliGRAM(s) Oral at bedtime  bisacodyl 5 milliGRAM(s) Oral at bedtime  chlorhexidine 4% Liquid 1 Application(s) Topical <User Schedule>  dextrose 40% Gel 15 Gram(s) Oral once PRN  dextrose 50% Injectable 12.5 Gram(s) IV Push once  dextrose 50% Injectable 25 Gram(s) IV Push once  dextrose 50% Injectable 25 Gram(s) IV Push once  DOBUTamine Infusion 7.5 MICROgram(s)/kG/Min IV Continuous <Continuous>  docusate sodium 100 milliGRAM(s) Oral two times a day  finasteride 5 milliGRAM(s) Oral daily  insulin lispro (HumaLOG) corrective regimen sliding scale   SubCutaneous three times a day before meals  insulin lispro (HumaLOG) corrective regimen sliding scale   SubCutaneous at bedtime  levothyroxine 75 MICROGram(s) Oral daily  norepinephrine Infusion 1.2 MICROgram(s)/kG/Min IV Continuous <Continuous>  pantoprazole    Tablet 40 milliGRAM(s) Oral before breakfast  senna 2 Tablet(s) Oral at bedtime  sevelamer hydrochloride 1600 milliGRAM(s) Oral <User Schedule>  sodium chloride 0.65% Nasal 1 Spray(s) Both Nostrils four times a day  vasopressin Infusion 0.04 Unit(s)/Min IV Continuous <Continuous>      Vital Signs Last 24 Hrs  T(C): 36.8 (05 Jun 2018 12:00), Max: 37.1 (05 Jun 2018 08:35)  T(F): 98.3 (05 Jun 2018 12:00), Max: 98.7 (05 Jun 2018 08:35)  HR: 105 (05 Jun 2018 14:00) (92 - 120)  BP: 105/69 (05 Jun 2018 14:00) (81/44 - 136/66)  BP(mean): 77 (05 Jun 2018 14:00) (51 - 95)  RR: 20 (05 Jun 2018 14:00) (13 - 28)  SpO2: 96% (05 Jun 2018 14:00) (93% - 100%)    PHYSICAL EXAM:  General: [x ] non-toxic  HEAD/EYES: [ ] PERRL [x ] white sclera [ ] icterus  ENT:  [ ] normal [x ] supple [ ] thrush [ ] pharyngeal exudate  Cardiovascular:   [ ] murmur [x ] normal [ ] PPM/AICD  Respiratory:  [x ] clear to ausculation bilaterally  GI:  [x ] soft, non-tender, normal bowel sounds  :  [ ] rosa [ ] no CVA tenderness   Musculoskeletal:  [ ] no synovitis  Neurologic:  [ ] non-focal exam   Skin:  [x ] no rash  Lymph: [ ] no lymphadenopathy  Psychiatric:  [x ] appropriate affect [ ] alert & oriented  Lines:  [x ] no phlebitis [ ] central line                                12.4   15.19 )-----------( 160      ( 05 Jun 2018 05:00 )             39.6       06-05    128<L>  |  88<L>  |  29<H>  ----------------------------<  265<H>  4.4   |  22  |  4.58<H>    Ca    8.7      05 Jun 2018 05:00  Phos  2.8     06-05  Mg     2.2     06-05    TPro  8.2  /  Alb  3.0<L>  /  TBili  0.6  /  DBili  x   /  AST  20  /  ALT  18  /  AlkPhos  224<H>  06-05          MICROBIOLOGY:    RADIOLOGY:

## 2018-06-05 NOTE — PROGRESS NOTE ADULT - PROBLEM SELECTOR PLAN 9
monitor his blood glucose:  not on FS defer to CCU  6/3 FS with sliding scale  6/5: blood glucsoe running high: would defer to primary team for the management:

## 2018-06-05 NOTE — PROGRESS NOTE ADULT - SUBJECTIVE AND OBJECTIVE BOX
Pt seen and examined at bedside  c/o inc dyspnea.  had HD yesterday, 3 litres removed.  still ind dyspnea, slightly better than yesterday  no fever,chills      Allergies:  No Known Allergies    Hospital Medications:   MEDICATIONS  (STANDING):  amiodarone    Tablet 100 milliGRAM(s) Oral daily  atorvastatin 80 milliGRAM(s) Oral at bedtime  bisacodyl 5 milliGRAM(s) Oral at bedtime  chlorhexidine 4% Liquid 1 Application(s) Topical <User Schedule>  dextrose 50% Injectable 12.5 Gram(s) IV Push once  dextrose 50% Injectable 25 Gram(s) IV Push once  dextrose 50% Injectable 25 Gram(s) IV Push once  DOBUTamine Infusion 7.5 MICROgram(s)/kG/Min (15.975 mL/Hr) IV Continuous <Continuous>  docusate sodium 100 milliGRAM(s) Oral two times a day  finasteride 5 milliGRAM(s) Oral daily  insulin lispro (HumaLOG) corrective regimen sliding scale   SubCutaneous three times a day before meals  insulin lispro (HumaLOG) corrective regimen sliding scale   SubCutaneous at bedtime  levothyroxine 75 MICROGram(s) Oral daily  norepinephrine Infusion 1.2 MICROgram(s)/kG/Min (79.875 mL/Hr) IV Continuous <Continuous>  pantoprazole    Tablet 40 milliGRAM(s) Oral before breakfast  piperacillin/tazobactam IVPB. 3.375 Gram(s) IV Intermittent every 12 hours  senna 2 Tablet(s) Oral at bedtime  sevelamer hydrochloride 1600 milliGRAM(s) Oral <User Schedule>  sodium chloride 0.65% Nasal 1 Spray(s) Both Nostrils four times a day  vasopressin Infusion 0.04 Unit(s)/Min (2.4 mL/Hr) IV Continuous <Continuous>            VITALS:  T(F): 98.7 (06-05-18 @ 08:35), Max: 98.7 (06-05-18 @ 08:35)  HR: 108 (06-05-18 @ 12:00)  BP: 109/63 (06-05-18 @ 12:00)  RR: 22 (06-05-18 @ 12:00)  SpO2: 97% (06-05-18 @ 12:00)  Wt(kg): --    06-04 @ 07:01  -  06-05 @ 07:00  --------------------------------------------------------  IN: 479.5 mL / OUT: 3900 mL / NET: -3420.5 mL    06-05 @ 07:01  -  06-05 @ 12:20  --------------------------------------------------------  IN: 597.8 mL / OUT: 0 mL / NET: 597.8 mL        PHYSICAL EXAM:  Constitutional: sitting in bed with nasal O2, slightly tachypneic  HEENT: anicteric sclera, oropharynx clear, MMM  Neck: inc  JVD  Respiratory: fine crepts both lungs, up to mid lung  Cardiovascular: S1, S2, irreg  Gastrointestinal: BS+, soft, NT/ND  Extremities: No cyanosis or clubbing. 2+ leg edema  Neurological: A/O x 3, no focal deficits  Psychiatric: Normal mood, normal affect  : No CVA tenderness. No rosa.   Skin: No rashes  Vascular Access:      LABS:  06-05    128<L>  |  88<L>  |  29<H>  ----------------------------<  265<H>  4.4   |  22  |  4.58<H>    Ca    8.7      05 Jun 2018 05:00  Phos  2.8     06-05  Mg     2.2     06-05    TPro  8.2  /  Alb  3.0<L>  /  TBili  0.6  /  DBili      /  AST  20  /  ALT  18  /  AlkPhos  224<H>  06-05    Creatinine Trend: 4.58 <--, 4.06 <--, 6.49 <--, 5.38 <--, 4.20 <--, 4.87 <--, 3.73 <--, 5.53 <--                        12.4   15.19 )-----------( 160      ( 05 Jun 2018 05:00 )             39.6     Urine Studies:      RADIOLOGY & ADDITIONAL STUDIES:

## 2018-06-05 NOTE — PROGRESS NOTE ADULT - ASSESSMENT
62 y/o male w/ PMHX of MI in past (Per patient, [facility]) no stents, HFrEF (LVEF 15%, LVIDd 6.0 cm, w/ AICD) severe TR, on chronic home dobutamine 7.5 mcg/kg/min,  ESRD on HD (3-4 x a week), HLD, DM II, a.fib on coumadin, interstitial pulmonary lung disease on chronic home O2, hypotension on midodrine comes in with epistaxis. Epistaxis is now resolved. He was found to be hypervolemic, and was given UF the same night, and HD the next day. He will have HD today as well. He has been admitted 6 times this year for various reasons, but mostly for SOB.  RHC 17 on 2.5 mcg/kg/min of dobutamine 2.5 mcg/kg/min showed RA 25, PCWP 25, PA 61/29/39, PA sat 42.8%, CI 1.65, CO 3.60, PVR 3.84. Currently He is on  7.5 mcg/kg/min. He admits to SOB at rest sometimes, but mostly with minimal exertion (typically walking 10 steps), orthopnea. No CP, palpitations, dizziness or syncope. ICD does not reveal any events. Patient had RHC and showed RA 14-20, PCWP 22, PA 44/18/28, AO sat 92%, PA sat 58.3%, CO 4.66, CI 2.46, -based on these numberse, Dobutamine was decreased to 5 mcg/kg/min on 18. Hemodynamics on - CVP 8, PA sat 63.6%,CO 5.25, CI 2.76. Dobutamine was further decreased to 2.5 mcg/kg/min, but patient experienced abdominal pain, dobutamine was increased back to 5 mcg/kg/min on  evening. Dobutamine was further weaned down to 2.5 mcg/kg/min. Abdominal pain was investigated via CT abdomen, pelvis-possible acute cholecystitis suspected. Pt was febrile and had leukocytosis, emperic coverage w/ vanco and Zosyn initiated. HD continued.

## 2018-06-05 NOTE — PROGRESS NOTE ADULT - PROBLEM SELECTOR PLAN 8
Cont BD : I don't think he needs steroids at this time: He also has underlying interstitial lung disease: steroids are not going to help ILD  : keep off steorids: given honeycombing, don't think steroids will work  ; Cont BD : pt has not been wheezin/2 stable. continue bronchodilators.  6/3 keep O2 sat between 88 to 92%  : on nebs prn: can start Symbicort 2 p twice a day

## 2018-06-05 NOTE — PROGRESS NOTE ADULT - PROBLEM SELECTOR PLAN 2
w/shock- BP low, asympt, chronic low    vasopressin on Levophed now, per CCU   Dobutamine drip as per HF team/CCU  consider restrting on Midodrine to wean off iv pressors

## 2018-06-05 NOTE — PROGRESS NOTE ADULT - SUBJECTIVE AND OBJECTIVE BOX
Note Incomplete  --- Pending Attending Rounds      Briefly: 63yM HFrEF (chronic  gtt), Pulm Fibrosis, A-fib (warfarin), ESRD (HD) p/w SOB, s/p aggro fluid removal, course c/b sepsis     Interval Events: overnight started vasopressin gtt, up-titrated norephinephrine gtt to 1.2    Subjective:   GEN no fevers, no chills  RESP breathing better. cough productive of clear sputum, no hemoptysis  CV no chest pain, no palpitations  GI abd pain better    Physical:  ICU Vital Signs Last 24 Hrs  T(C): 37.1 (2018 08:35), Max: 37.1 (2018 08:35)  T(F): 98.7 (2018 08:35), Max: 98.7 (2018 08:35)  HR: 113 (2018 08:00) (61 - 115)  BP: 101/43 (2018 08:00) (70/51 - 136/66)  BP(mean): 55 (2018 08:00) (51 - 95)  ABP: 101/53 (2018 08:00) (72/45 - 108/61)  ABP(mean): 69 (2018 08:00) (53 - 77)  RR: 28 (2018 08:00) (13 - 53)  SpO2: 93% (2018 08:00) (90% - 100%)      Telemetry:     EKG:    Exam:  GEN awake alert, saturating well on nasal cannula  NECK no jvd visible  RESP crackles multifocal, similar to prior  CV irreg irreg s1 s2  ABD RUQ tenderness, +Altamirano's sign this morning  EXT feet cool b/l    LABS:  CAPILLARY BLOOD GLUCOSE      POCT Blood Glucose.: 401 mg/dL (2018 22:22)  POCT Blood Glucose.: 252 mg/dL (2018 17:08)  POCT Blood Glucose.: 128 mg/dL (2018 12:39)    I&O's Summary    2018 07:01  -  2018 07:00  --------------------------------------------------------  IN: 479.5 mL / OUT: 3900 mL / NET: -3420.5 mL                              12.4   15.19 )-----------( 160      ( 2018 05:00 )             39.6     WBC Trend: 15.19<--, 16.02<--, 15.07<--  06-05    128<L>  |  88<L>  |  29<H>  ----------------------------<  265<H>  4.4   |  22  |  4.58<H>    Ca    8.7      2018 05:00  Phos  2.8     06-  Mg     2.2     06-05    TPro  8.2  /  Alb  3.0<L>  /  TBili  0.6  /  DBili  x   /  AST  20  /  ALT  18  /  AlkPhos  224<H>  06-05    Creatinine Trend: 4.58<--, 4.06<--, 6.49<--, 5.38<--, 4.20<--, 4.87<--  PT/INR - ( 2018 05:00 )   PT: 85.2 SEC;   INR: 7.37          PTT - ( 2018 05:00 )  PTT:65.0 SEC          Imaging:        MEDICATIONS  (STANDING):  amiodarone    Tablet 100 milliGRAM(s) Oral daily  atorvastatin 80 milliGRAM(s) Oral at bedtime  bisacodyl 5 milliGRAM(s) Oral at bedtime  chlorhexidine 4% Liquid 1 Application(s) Topical <User Schedule>  dextrose 50% Injectable 12.5 Gram(s) IV Push once  dextrose 50% Injectable 25 Gram(s) IV Push once  dextrose 50% Injectable 25 Gram(s) IV Push once  DOBUTamine Infusion 7.5 MICROgram(s)/kG/Min (15.975 mL/Hr) IV Continuous <Continuous>  docusate sodium 100 milliGRAM(s) Oral two times a day  finasteride 5 milliGRAM(s) Oral daily  insulin lispro (HumaLOG) corrective regimen sliding scale   SubCutaneous three times a day before meals  insulin lispro (HumaLOG) corrective regimen sliding scale   SubCutaneous at bedtime  levothyroxine 75 MICROGram(s) Oral daily  norepinephrine Infusion 0.05 MICROgram(s)/kG/Min (3.328 mL/Hr) IV Continuous <Continuous>  pantoprazole    Tablet 40 milliGRAM(s) Oral before breakfast  piperacillin/tazobactam IVPB. 3.375 Gram(s) IV Intermittent every 12 hours  senna 2 Tablet(s) Oral at bedtime  sevelamer hydrochloride 1600 milliGRAM(s) Oral <User Schedule>  sodium chloride 0.65% Nasal 1 Spray(s) Both Nostrils four times a day  vasopressin Infusion 0.04 Unit(s)/Min (2.4 mL/Hr) IV Continuous <Continuous>    MEDICATIONS  (PRN):  ALBUTerol/ipratropium for Nebulization 3 milliLiter(s) Nebulizer every 6 hours PRN Shortness of Breath and/or Wheezing  dextrose 40% Gel 15 Gram(s) Oral once PRN Blood Glucose LESS THAN 70 milliGRAM(s)/deciliter      Consult Recommendations: Note Incomplete  --- Pending Attending Rounds      Briefly: 63yM HFrEF (chronic  gtt), Pulm Fibrosis, A-fib (warfarin), ESRD (HD) p/w SOB, s/p aggro fluid removal, course c/b sepsis     Interval Events: overnight started vasopressin gtt, up-titrated norephinephrine gtt to 1.2    Subjective:   GEN no fevers, no chills  RESP breathing better. cough productive of clear sputum, no hemoptysis  CV no chest pain, no palpitations  GI abd pain better    Physical:  ICU Vital Signs Last 24 Hrs  T(C): 37.1 (2018 08:35), Max: 37.1 (2018 08:35)  T(F): 98.7 (2018 08:35), Max: 98.7 (2018 08:35)  HR: 113 (2018 08:00) (61 - 115)  BP: 101/43 (2018 08:00) (70/51 - 136/66)  BP(mean): 55 (2018 08:00) (51 - 95)  ABP: 101/53 (2018 08:00) (72/45 - 108/61)  ABP(mean): 69 (2018 08:00) (53 - 77)  RR: 28 (2018 08:00) (13 - 53)  SpO2: 93% (2018 08:00) (90% - 100%)      Telemetry: A-fib, frequent PVCs/NSVT up to ~6 consec beats    EKG:    Exam:  GEN awake alert, saturating well on nasal cannula  NECK no jvd visible  RESP crackles multifocal, similar to prior  CV irreg irreg s1 s2  ABD RUQ tenderness, +Altamirano's sign this morning  EXT feet cool b/l    LABS:  CAPILLARY BLOOD GLUCOSE      POCT Blood Glucose.: 401 mg/dL (2018 22:22)  POCT Blood Glucose.: 252 mg/dL (2018 17:08)  POCT Blood Glucose.: 128 mg/dL (2018 12:39)    I&O's Summary    2018 07:01  -  2018 07:00  --------------------------------------------------------  IN: 479.5 mL / OUT: 3900 mL / NET: -3420.5 mL                              12.4   15.19 )-----------( 160      ( 2018 05:00 )             39.6     WBC Trend: 15.19<--, 16.02<--, 15.07<--  06-05    128<L>  |  88<L>  |  29<H>  ----------------------------<  265<H>  4.4   |  22  |  4.58<H>    Ca    8.7      2018 05:00  Phos  2.8     06-  Mg     2.2         TPro  8.2  /  Alb  3.0<L>  /  TBili  0.6  /  DBili  x   /  AST  20  /  ALT  18  /  AlkPhos  224<H>  06-05    Creatinine Trend: 4.58<--, 4.06<--, 6.49<--, 5.38<--, 4.20<--, 4.87<--  PT/INR - ( 2018 05:00 )   PT: 85.2 SEC;   INR: 7.37          PTT - ( 2018 05:00 )  PTT:65.0 SEC          Imaging:        MEDICATIONS  (STANDING):  amiodarone    Tablet 100 milliGRAM(s) Oral daily  atorvastatin 80 milliGRAM(s) Oral at bedtime  bisacodyl 5 milliGRAM(s) Oral at bedtime  chlorhexidine 4% Liquid 1 Application(s) Topical <User Schedule>  dextrose 50% Injectable 12.5 Gram(s) IV Push once  dextrose 50% Injectable 25 Gram(s) IV Push once  dextrose 50% Injectable 25 Gram(s) IV Push once  DOBUTamine Infusion 7.5 MICROgram(s)/kG/Min (15.975 mL/Hr) IV Continuous <Continuous>  docusate sodium 100 milliGRAM(s) Oral two times a day  finasteride 5 milliGRAM(s) Oral daily  insulin lispro (HumaLOG) corrective regimen sliding scale   SubCutaneous three times a day before meals  insulin lispro (HumaLOG) corrective regimen sliding scale   SubCutaneous at bedtime  levothyroxine 75 MICROGram(s) Oral daily  norepinephrine Infusion 0.05 MICROgram(s)/kG/Min (3.328 mL/Hr) IV Continuous <Continuous>  pantoprazole    Tablet 40 milliGRAM(s) Oral before breakfast  piperacillin/tazobactam IVPB. 3.375 Gram(s) IV Intermittent every 12 hours  senna 2 Tablet(s) Oral at bedtime  sevelamer hydrochloride 1600 milliGRAM(s) Oral <User Schedule>  sodium chloride 0.65% Nasal 1 Spray(s) Both Nostrils four times a day  vasopressin Infusion 0.04 Unit(s)/Min (2.4 mL/Hr) IV Continuous <Continuous>    MEDICATIONS  (PRN):  ALBUTerol/ipratropium for Nebulization 3 milliLiter(s) Nebulizer every 6 hours PRN Shortness of Breath and/or Wheezing  dextrose 40% Gel 15 Gram(s) Oral once PRN Blood Glucose LESS THAN 70 milliGRAM(s)/deciliter      Consult Recommendations: Note Complete      Briefly: 63yM HFrEF (chronic  gtt), Pulm Fibrosis, A-fib (warfarin), ESRD (HD) p/w SOB, s/p aggro fluid removal, course c/b sepsis     Interval Events: overnight started vasopressin gtt, up-titrated norephinephrine gtt to 1.2    Subjective:   GEN no fevers, no chills  RESP breathing better. cough productive of clear sputum, no hemoptysis  CV no chest pain, no palpitations  GI abd pain better    Physical:  ICU Vital Signs Last 24 Hrs  T(C): 37.1 (2018 08:35), Max: 37.1 (2018 08:35)  T(F): 98.7 (2018 08:35), Max: 98.7 (2018 08:35)  HR: 113 (2018 08:00) (61 - 115)  BP: 101/43 (2018 08:00) (70/51 - 136/66)  BP(mean): 55 (2018 08:00) (51 - 95)  ABP: 101/53 (2018 08:00) (72/45 - 108/61)  ABP(mean): 69 (2018 08:00) (53 - 77)  RR: 28 (2018 08:00) (13 - 53)  SpO2: 93% (2018 08:00) (90% - 100%)      Telemetry: A-fib, frequent PVCs/NSVT up to ~6 consec beats    EKG:    Exam:  GEN awake alert, saturating well on nasal cannula  NECK no jvd visible  RESP crackles multifocal, similar to prior  CV irreg irreg s1 s2, systolic murmur at apex  ABD RUQ tenderness, +Altamirano's sign this morning  EXT feet cool b/l    LABS:  CAPILLARY BLOOD GLUCOSE      POCT Blood Glucose.: 401 mg/dL (2018 22:22)  POCT Blood Glucose.: 252 mg/dL (2018 17:08)  POCT Blood Glucose.: 128 mg/dL (2018 12:39)    I&O's Summary    2018 07:01  -  2018 07:00  --------------------------------------------------------  IN: 479.5 mL / OUT: 3900 mL / NET: -3420.5 mL                              12.4   15.19 )-----------( 160      ( 2018 05:00 )             39.6     WBC Trend: 15.19<--, 16.02<--, 15.07<--  06-05    128<L>  |  88<L>  |  29<H>  ----------------------------<  265<H>  4.4   |  22  |  4.58<H>    Ca    8.7      2018 05:00  Phos  2.8     06-  Mg     2.2         TPro  8.2  /  Alb  3.0<L>  /  TBili  0.6  /  DBili  x   /  AST  20  /  ALT  18  /  AlkPhos  224<H>  06-05    Creatinine Trend: 4.58<--, 4.06<--, 6.49<--, 5.38<--, 4.20<--, 4.87<--  PT/INR - ( 2018 05:00 )   PT: 85.2 SEC;   INR: 7.37          PTT - ( 2018 05:00 )  PTT:65.0 SEC          Imaging:        MEDICATIONS  (STANDING):  amiodarone    Tablet 100 milliGRAM(s) Oral daily  atorvastatin 80 milliGRAM(s) Oral at bedtime  bisacodyl 5 milliGRAM(s) Oral at bedtime  chlorhexidine 4% Liquid 1 Application(s) Topical <User Schedule>  dextrose 50% Injectable 12.5 Gram(s) IV Push once  dextrose 50% Injectable 25 Gram(s) IV Push once  dextrose 50% Injectable 25 Gram(s) IV Push once  DOBUTamine Infusion 7.5 MICROgram(s)/kG/Min (15.975 mL/Hr) IV Continuous <Continuous>  docusate sodium 100 milliGRAM(s) Oral two times a day  finasteride 5 milliGRAM(s) Oral daily  insulin lispro (HumaLOG) corrective regimen sliding scale   SubCutaneous three times a day before meals  insulin lispro (HumaLOG) corrective regimen sliding scale   SubCutaneous at bedtime  levothyroxine 75 MICROGram(s) Oral daily  norepinephrine Infusion 0.05 MICROgram(s)/kG/Min (3.328 mL/Hr) IV Continuous <Continuous>  pantoprazole    Tablet 40 milliGRAM(s) Oral before breakfast  piperacillin/tazobactam IVPB. 3.375 Gram(s) IV Intermittent every 12 hours  senna 2 Tablet(s) Oral at bedtime  sevelamer hydrochloride 1600 milliGRAM(s) Oral <User Schedule>  sodium chloride 0.65% Nasal 1 Spray(s) Both Nostrils four times a day  vasopressin Infusion 0.04 Unit(s)/Min (2.4 mL/Hr) IV Continuous <Continuous>    MEDICATIONS  (PRN):  ALBUTerol/ipratropium for Nebulization 3 milliLiter(s) Nebulizer every 6 hours PRN Shortness of Breath and/or Wheezing  dextrose 40% Gel 15 Gram(s) Oral once PRN Blood Glucose LESS THAN 70 milliGRAM(s)/deciliter      Consult Recommendations: Note Complete      Briefly: 63yM HFrEF (chronic  gtt), Pulm Fibrosis, A-fib (warfarin), ESRD (HD) p/w SOB, s/p aggro fluid removal, course c/b sepsis     Interval Events: overnight started vasopressin gtt, up-titrated norephinephrine gtt to 1.2    Subjective:   GEN no fevers, no chills  RESP breathing better. cough productive of clear sputum, no hemoptysis  CV no chest pain, no palpitations  GI abd pain better    Physical:  ICU Vital Signs Last 24 Hrs  T(C): 37.1 (2018 08:35), Max: 37.1 (2018 08:35)  T(F): 98.7 (2018 08:35), Max: 98.7 (2018 08:35)  HR: 113 (2018 08:00) (61 - 115)  BP: 101/43 (2018 08:00) (70/51 - 136/66)  BP(mean): 55 (2018 08:00) (51 - 95)  ABP: 101/53 (2018 08:00) (72/45 - 108/61)  ABP(mean): 69 (2018 08:00) (53 - 77)  RR: 28 (2018 08:00) (13 - 53)  SpO2: 93% (2018 08:00) (90% - 100%)      Telemetry: A-fib, frequent PVCs/NSVT up to ~6 consec beats    EKG :   - A-fib, , QTc 462 per automated read, extremity leads w low voltage  - Lead I w RSR', similar to  but not seen prior to then  - PVC  - no acute ischemic changes  - overall similar to prior  - Impression: A-fib w mild tachycardia, QRS low voltage in extrem leads      Exam:  GEN awake alert, saturating well on nasal cannula  NECK no jvd visible  RESP crackles multifocal, similar to prior  CV irreg irreg s1 s2, systolic murmur at apex  ABD RUQ tenderness, +Altamirano's sign this morning  EXT feet cool b/l    LABS:  CAPILLARY BLOOD GLUCOSE      POCT Blood Glucose.: 401 mg/dL (2018 22:22)  POCT Blood Glucose.: 252 mg/dL (2018 17:08)  POCT Blood Glucose.: 128 mg/dL (2018 12:39)    I&O's Summary    2018 07:01  -  2018 07:00  --------------------------------------------------------  IN: 479.5 mL / OUT: 3900 mL / NET: -3420.5 mL                              12.4   15.19 )-----------( 160      ( 2018 05:00 )             39.6     WBC Trend: 15.19<--, 16.02<--, 15.07<--  0605    128<L>  |  88<L>  |  29<H>  ----------------------------<  265<H>  4.4   |  22  |  4.58<H>    Ca    8.7      2018 05:00  Phos  2.8     06-  Mg     2.2     06    TPro  8.2  /  Alb  3.0<L>  /  TBili  0.6  /  DBili  x   /  AST  20  /  ALT  18  /  AlkPhos  224<H>      Creatinine Trend: 4.58<--, 4.06<--, 6.49<--, 5.38<--, 4.20<--, 4.87<--  PT/INR - ( 2018 05:00 )   PT: 85.2 SEC;   INR: 7.37          PTT - ( 2018 05:00 )  PTT:65.0 SEC          Imaging:        MEDICATIONS  (STANDING):  amiodarone    Tablet 100 milliGRAM(s) Oral daily  atorvastatin 80 milliGRAM(s) Oral at bedtime  bisacodyl 5 milliGRAM(s) Oral at bedtime  chlorhexidine 4% Liquid 1 Application(s) Topical <User Schedule>  dextrose 50% Injectable 12.5 Gram(s) IV Push once  dextrose 50% Injectable 25 Gram(s) IV Push once  dextrose 50% Injectable 25 Gram(s) IV Push once  DOBUTamine Infusion 7.5 MICROgram(s)/kG/Min (15.975 mL/Hr) IV Continuous <Continuous>  docusate sodium 100 milliGRAM(s) Oral two times a day  finasteride 5 milliGRAM(s) Oral daily  insulin lispro (HumaLOG) corrective regimen sliding scale   SubCutaneous three times a day before meals  insulin lispro (HumaLOG) corrective regimen sliding scale   SubCutaneous at bedtime  levothyroxine 75 MICROGram(s) Oral daily  norepinephrine Infusion 0.05 MICROgram(s)/kG/Min (3.328 mL/Hr) IV Continuous <Continuous>  pantoprazole    Tablet 40 milliGRAM(s) Oral before breakfast  piperacillin/tazobactam IVPB. 3.375 Gram(s) IV Intermittent every 12 hours  senna 2 Tablet(s) Oral at bedtime  sevelamer hydrochloride 1600 milliGRAM(s) Oral <User Schedule>  sodium chloride 0.65% Nasal 1 Spray(s) Both Nostrils four times a day  vasopressin Infusion 0.04 Unit(s)/Min (2.4 mL/Hr) IV Continuous <Continuous>    MEDICATIONS  (PRN):  ALBUTerol/ipratropium for Nebulization 3 milliLiter(s) Nebulizer every 6 hours PRN Shortness of Breath and/or Wheezing  dextrose 40% Gel 15 Gram(s) Oral once PRN Blood Glucose LESS THAN 70 milliGRAM(s)/deciliter      Consult Recommendations:

## 2018-06-05 NOTE — PROGRESS NOTE ADULT - ASSESSMENT
#ESRD on HD  #Severe NICM on chronic dobutrex. In the past has not tolerated multiple attempts to wean off.  #DM  #ICD  #AF  #COPD  #Fevers: opn antibiotics, thus far refusing HIDA scan and consideration percutaneous cholecystotomy if needed. R/B/A d/w patient in detail and he will consider.  Prognosis guarded.

## 2018-06-05 NOTE — PROGRESS NOTE ADULT - SUBJECTIVE AND OBJECTIVE BOX
Patient is a 63y old  Male who presents with a chief complaint of SOB (29 May 2018 15:55)      Any change in ROS: on two pressors now: though oxygenation is better     MEDICATIONS  (STANDING):  amiodarone    Tablet 100 milliGRAM(s) Oral daily  atorvastatin 80 milliGRAM(s) Oral at bedtime  bisacodyl 5 milliGRAM(s) Oral at bedtime  chlorhexidine 4% Liquid 1 Application(s) Topical <User Schedule>  dextrose 50% Injectable 12.5 Gram(s) IV Push once  dextrose 50% Injectable 25 Gram(s) IV Push once  dextrose 50% Injectable 25 Gram(s) IV Push once  DOBUTamine Infusion 7.5 MICROgram(s)/kG/Min (15.975 mL/Hr) IV Continuous <Continuous>  docusate sodium 100 milliGRAM(s) Oral two times a day  finasteride 5 milliGRAM(s) Oral daily  insulin lispro (HumaLOG) corrective regimen sliding scale   SubCutaneous three times a day before meals  insulin lispro (HumaLOG) corrective regimen sliding scale   SubCutaneous at bedtime  levothyroxine 75 MICROGram(s) Oral daily  norepinephrine Infusion 1.2 MICROgram(s)/kG/Min (79.875 mL/Hr) IV Continuous <Continuous>  pantoprazole    Tablet 40 milliGRAM(s) Oral before breakfast  piperacillin/tazobactam IVPB. 3.375 Gram(s) IV Intermittent every 12 hours  senna 2 Tablet(s) Oral at bedtime  sevelamer hydrochloride 1600 milliGRAM(s) Oral <User Schedule>  sodium chloride 0.65% Nasal 1 Spray(s) Both Nostrils four times a day  vasopressin Infusion 0.04 Unit(s)/Min (2.4 mL/Hr) IV Continuous <Continuous>    MEDICATIONS  (PRN):  ALBUTerol/ipratropium for Nebulization 3 milliLiter(s) Nebulizer every 6 hours PRN Shortness of Breath and/or Wheezing  dextrose 40% Gel 15 Gram(s) Oral once PRN Blood Glucose LESS THAN 70 milliGRAM(s)/deciliter    Vital Signs Last 24 Hrs  T(C): 37.1 (05 Jun 2018 08:35), Max: 37.1 (05 Jun 2018 08:35)  T(F): 98.7 (05 Jun 2018 08:35), Max: 98.7 (05 Jun 2018 08:35)  HR: 108 (05 Jun 2018 12:00) (92 - 115)  BP: 109/63 (05 Jun 2018 12:00) (81/44 - 136/66)  BP(mean): 72 (05 Jun 2018 12:00) (51 - 95)  RR: 22 (05 Jun 2018 12:00) (13 - 28)  SpO2: 97% (05 Jun 2018 12:00) (90% - 100%)    I&O's Summary    04 Jun 2018 07:01  -  05 Jun 2018 07:00  --------------------------------------------------------  IN: 479.5 mL / OUT: 3900 mL / NET: -3420.5 mL    05 Jun 2018 07:01  -  05 Jun 2018 12:35  --------------------------------------------------------  IN: 597.8 mL / OUT: 0 mL / NET: 597.8 mL          Physical Exam:   GENERAL: NAD, well-groomed, well-developed  HEENT: BELL/   Atraumatic, Normocephalic  ENMT: No tonsillar erythema, exudates, or enlargement; Moist mucous membranes, Good dentition, No lesions  NECK: Supple, No JVD, Normal thyroid  CHEST/LUNG: crackles bilaterally +  CVS: Regular rate and rhythm; No murmurs, rubs, or gallops  GI: : Soft, Nontender, Nondistended; Bowel sounds present  NERVOUS SYSTEM:  Alert & Oriented X3  EXTREMITIES:  2+ Peripheral Pulses, No clubbing, cyanosis, or edema  LYMPH: No lymphadenopathy noted  SKIN: No rashes or lesions  ENDOCRINOLOGY: No Thyromegaly  PSYCH: Appropriate    Labs:  ABG - ( 05 Jun 2018 05:00 )  pH, Arterial: 7.28  pH, Blood: x     /  pCO2: 53    /  pO2: 106   / HCO3: 22    / Base Excess: -2.2  /  SaO2: 96.8            20, 22, 26, 26, 24, 23, 24, 24, 27, 23                            12.4   15.19 )-----------( 160      ( 05 Jun 2018 05:00 )             39.6                         12.3   16.02 )-----------( 152      ( 04 Jun 2018 19:33 )             39.7                         12.7   15.07 )-----------( 148      ( 04 Jun 2018 04:20 )             39.7                         12.9   25.43 )-----------( 132      ( 03 Jun 2018 05:00 )             42.8                         12.8   17.92 )-----------( 117      ( 02 Jun 2018 05:45 )             40.6     06-05    128<L>  |  88<L>  |  29<H>  ----------------------------<  265<H>  4.4   |  22  |  4.58<H>  06-04    127<L>  |  89<L>  |  26<H>  ----------------------------<  359<H>  4.2   |  23  |  4.06<H>  06-04    126<L>  |  88<L>  |  45<H>  ----------------------------<  223<H>  5.2   |  20<L>  |  6.49<H>  06-03    126<L>  |  88<L>  |  34<H>  ----------------------------<  312<H>  4.6   |  21<L>  |  5.38<H>  06-02    130<L>  |  93<L>  |  27<H>  ----------------------------<  199<H>  4.4   |  26  |  4.20<H>    Ca    8.7      05 Jun 2018 05:00  Ca    8.6      04 Jun 2018 19:33  Ca    8.8      04 Jun 2018 04:20  Phos  2.8     06-05  Phos  2.4     06-04  Phos  2.9     06-04  Mg     2.2     06-05  Mg     2.1     06-04  Mg     2.3     06-04    TPro  8.2  /  Alb  3.0<L>  /  TBili  0.6  /  DBili  x   /  AST  20  /  ALT  18  /  AlkPhos  224<H>  06-05  TPro  7.7  /  Alb  2.7<L>  /  TBili  0.6  /  DBili  x   /  AST  20  /  ALT  18  /  AlkPhos  213<H>  06-04  TPro  7.8  /  Alb  2.7<L>  /  TBili  0.5  /  DBili  x   /  AST  15  /  ALT  15  /  AlkPhos  194<H>  06-04  TPro  8.1  /  Alb  2.8<L>  /  TBili  0.5  /  DBili  x   /  AST  20  /  ALT  16  /  AlkPhos  205<H>  06-03    CAPILLARY BLOOD GLUCOSE      POCT Blood Glucose.: 370 mg/dL (05 Jun 2018 12:27)  POCT Blood Glucose.: 328 mg/dL (05 Jun 2018 08:54)  POCT Blood Glucose.: 401 mg/dL (04 Jun 2018 22:22)  POCT Blood Glucose.: 252 mg/dL (04 Jun 2018 17:08)  POCT Blood Glucose.: 128 mg/dL (04 Jun 2018 12:39)      LIVER FUNCTIONS - ( 05 Jun 2018 05:00 )  Alb: 3.0 g/dL / Pro: 8.2 g/dL / ALK PHOS: 224 u/L / ALT: 18 u/L / AST: 20 u/L / GGT: x           PT/INR - ( 05 Jun 2018 05:00 )   PT: 85.2 SEC;   INR: 7.37          PTT - ( 05 Jun 2018 05:00 )  PTT:65.0 SEC    Lactate, Blood: 0.7 mmol/L (06-04 @ 11:30)  Lactate, Blood: 1.3 mmol/L (06-02 @ 10:40)        RECENT CULTURES:  06-04 @ 05:38 BLOOD PERIPHERAL                  NO ORGANISMS ISOLATED  NO ORGANISMS ISOLATED AT 24 HOURS  06-02 @ 15:34 BLOOD VENOUS                  NO ORGANISMS ISOLATED  NO ORGANISMS ISOLATED AT 48 HRS.  06-02 @ 10:56 BLOOD-CATHETER                  NO ORGANISMS ISOLATED  NO ORGANISMS ISOLATED AT 72 HRS.        RESPIRATORY CULTURES:          Studies  Chest X-RAY  CT SCAN Chest   Venous Dopplers: LE:   CT Abdomen  Others    < from: Xray Chest 1 View- PORTABLE-Urgent (06.04.18 @ 20:22) >      PROCEDURE DATE:  Jun 4 2018         INTERPRETATION:  TIME OF EXAM: June 4, 2018 at 8:12 PM.    CLINICAL INFORMATION: Pulmonary fibrosis. Heart failure. Now with sepsis   and increasing oxygen requirements.    COMPARISON:  AP chest x-ray from June 2, 2018 and CT scan of the chest   from Tami 3, 2018.    TECHNIQUE:   AP Portable chest x-ray.    INTERPRETATION:     Heart size cannot be accurately evaluated on this image however the   cardiac silhouette isprobably enlarged.  A left chest wall ICD is again seen. The generator obscures part of the   left lung.  A right IJ catheter is unchanged in position.  Bilateral increased fine reticular opacities consistent with history of   interstitial lung disease are again noted. Superimposed edema and/or   infection is not excluded.  Small loculated right pleural effusion with likely associated passive   atelectasis is not significantly changed.  No pneumothorax seen.              IMPRESSION:  No significant interval change.    Bilateral increased fine reticular opacities consistent with history of   interstitial lung disease are again noted. Superimposed edema and/or   infection are not excluded.    Unchanged small loculated right pleural effusion with likely associated   passive atelectasis.                  FLORENCE BRANNON M.D., ATTENDING RADIOLOGIST  This document has been electronically signed. Jun 5 2018  8:31AM        < end of copied text >

## 2018-06-05 NOTE — PROGRESS NOTE ADULT - ATTENDING COMMENTS
events noted: Had a detailed discussion with the sister: Given my office number too: She will get me the official lung biopsy report from [place]:  6/4: As above:  6/5: Doing poorly: in shock and on multiple pressors with dobutamine though alert and awake: Cont shreya supportive care:

## 2018-06-05 NOTE — PROGRESS NOTE ADULT - ATTENDING COMMENTS
Resume home midodrine 30 mg PO TID. Continue dobutamine at current dose. He is not interested in pursuing further evaluation of abdominal pain. Unclear etiology of supratherapeutic INR.     Please call me with questions at [phone].

## 2018-06-05 NOTE — PROGRESS NOTE ADULT - SUBJECTIVE AND OBJECTIVE BOX
SUBJECTIVE: Asymptomatic in bed. Febrile course noted.  	  MEDICATIONS:  amiodarone    Tablet 100 milliGRAM(s) Oral daily  DOBUTamine Infusion 7.5 MICROgram(s)/kG/Min IV Continuous <Continuous>  norepinephrine Infusion 0.05 MICROgram(s)/kG/Min IV Continuous <Continuous>  piperacillin/tazobactam IVPB. 3.375 Gram(s) IV Intermittent every 12 hours  ALBUTerol/ipratropium for Nebulization 3 milliLiter(s) Nebulizer every 6 hours PRN  bisacodyl 5 milliGRAM(s) Oral at bedtime  docusate sodium 100 milliGRAM(s) Oral two times a day  pantoprazole    Tablet 40 milliGRAM(s) Oral before breakfast  senna 2 Tablet(s) Oral at bedtime  atorvastatin 80 milliGRAM(s) Oral at bedtime  dextrose 40% Gel 15 Gram(s) Oral once PRN  dextrose 50% Injectable 12.5 Gram(s) IV Push once  dextrose 50% Injectable 25 Gram(s) IV Push once  dextrose 50% Injectable 25 Gram(s) IV Push once  finasteride 5 milliGRAM(s) Oral daily  insulin lispro (HumaLOG) corrective regimen sliding scale   SubCutaneous three times a day before meals  insulin lispro (HumaLOG) corrective regimen sliding scale   SubCutaneous at bedtime  levothyroxine 75 MICROGram(s) Oral daily  vasopressin Infusion 0.04 Unit(s)/Min IV Continuous <Continuous>  chlorhexidine 4% Liquid 1 Application(s) Topical <User Schedule>  sodium chloride 0.65% Nasal 1 Spray(s) Both Nostrils four times a day      REVIEW OF SYSTEMS:    CONSTITUTIONAL: No fever, weight loss, or fatigue  EYES: No eye pain, visual disturbances, or discharge  NECK: No pain or stiffness  RESPIRATORY: No cough, wheezing, chills or hemoptysis; No Shortness of Breath  CARDIOVASCULAR: No chest pain, palpitations, dizziness, or leg swelling  GASTROINTESTINAL: No abdominal or epigastric pain. No nausea, vomiting, or hematemesis; No diarrhea or constipation. No melena or hematochezia.  GENITOURINARY: No dysuria, frequency, hematuria, or incontinence  NEUROLOGICAL: No headaches, memory loss, loss of strength, numbness, or tremors  SKIN: No itching, burning, rashes, or lesions   LYMPH Nodes: No enlarged glands  MUSCULOSKELETAL: No joint pain or swelling; No muscle, back, or extremity pain  All other review of systems are negative.  	      PHYSICAL EXAM:  T(C): 37.1 (06-05-18 @ 08:35), Max: 37.1 (06-05-18 @ 08:35)  HR: 106 (06-05-18 @ 09:00) (61 - 115)  BP: 101/43 (06-05-18 @ 08:00) (81/44 - 136/66)  RR: 19 (06-05-18 @ 09:00) (13 - 53)  SpO2: 96% (06-05-18 @ 09:00) (90% - 100%)  Wt(kg): --  I&O's Summary    04 Jun 2018 07:01  -  05 Jun 2018 07:00  --------------------------------------------------------  IN: 479.5 mL / OUT: 3900 mL / NET: -3420.5 mL    05 Jun 2018 07:01  -  05 Jun 2018 10:58  --------------------------------------------------------  IN: 483.6 mL / OUT: 0 mL / NET: 483.6 mL          PHYSICAL EXAM    Appearance: Normal	  HEENT:   Normal oral mucosa, PERRL, EOMI	  NECK: Soft and supple, No LAD, No JVD  Cardiovascular: Regular Rate and Rhythm, healed icd scar  Respiratory: decreased bilaterally  Gastrointestinal:  Soft, Non-tender, + BS	  Skin: No rashes, No ecchymoses, No cyanosis  Neurologic: Non-focal  Extremities: No clubbing, cyanosis or edema  Vascular: Peripheral pulses palpable 2+ bilaterally      LABS:	 	                  12.4   15.19 )-----------( 160      ( 05 Jun 2018 05:00 )             39.6     06-05    128<L>  |  88<L>  |  29<H>  ----------------------------<  265<H>  4.4   |  22  |  4.58<H>    Ca    8.7      05 Jun 2018 05:00  Phos  2.8     06-05  Mg     2.2     06-05    TPro  8.2  /  Alb  3.0<L>  /  TBili  0.6  /  DBili  x   /  AST  20  /  ALT  18  /  AlkPhos  224<H>  06-05

## 2018-06-05 NOTE — PROGRESS NOTE ADULT - PROBLEM SELECTOR PLAN 2
6/3 febrile last night. WBC went further up. Empiric antibiotics started by CCU team.  6/5: on broad spectrum antibiotics: ID following CT chest noted:

## 2018-06-05 NOTE — PROGRESS NOTE ADULT - ASSESSMENT
63M w severe CHF, on chronic dobutamine gtt at home x 3 years (follows up with Dr. Davis), AFib on coumadin, AICD, ESRD on HD MWF (plus add'l session on Saturday prn), COPD (on home O2), pulmonary fibrosis, presents to the ED with chief complaint of epistaxis.  Here with Acute on Chronic Heart Failure.  Now with worsening leukocytosis and fever requiring pressors - BC to date negative, however, CT abd suggests cholecystitis.      Suggest:  - continue zosyn  - Dose vanco by level   - Blood cx 6/4 without growth  - HIDA scan   - surgery evaluation pending results

## 2018-06-05 NOTE — PROGRESS NOTE ADULT - PROBLEM SELECTOR PLAN 1
had HD yesterday  still with dyspnea, leg edema, crpts both lungs  will rpt HD today, UFR only        will reevaluate tomorrow for additional PUF   low Na 2/2 hypervolemia.   c/w renal diet , fluid restriction 1L/day

## 2018-06-05 NOTE — PROGRESS NOTE ADULT - ASSESSMENT
63yM w severe HFrEF (on dobutamine gtt x3 yrs) w [employer] AICD, A-fib (on warfarin), ESRD on HD MWF (+Sat PRN), pulmonary fibrosis (on home O2), on  p/w epistaxis x1 day, also w acute-on-chronic SOB, found to be volume-overloaded despite reported med compliance, no dietary changes, and having gotten extra session of HD the day prior to admission. SBP 80s-90s in UEs, so in order to be monitored while undergoing fluid removal by HD/UF, was transferred to CCU.     Since admission, dyspnea improved.  gtt has been adjusted from 7.5 as low as 2.5, based on RHC measurements ([place] in place -). Course c/b abdominal pain and nausea, rising WBC, rectal temp >101, hypotension; BCx neg to date. Diff Dx for sepsis includes cholecystitis (favored by R-sided pain+tenderness and CT A/P, disfavored by LFTs wnl, US RUQ pending), gut translocation of GI shelley (favored by R-sided pain), pneumonia (favored by worsening CT chest and by worsening resp status), and/or central-line-associated bloodstream infection (favored by temporal association w presence of R femoral line).      # Neuro - no active issues    # ENT - epistaxis, resolved; attachment for home humidification of nasal cannula sent to patient's home  --- continue humidification of nasal cannula, nasal saline spray  --- facilitate patient's getting simple mask to use for supplemental O2 at home    # Cardiac  - Severe bi-ventricular systolic heart failure w AICD in place, on chronic dobutamine gtt 7.5, LVEF 15% this admission.    --- Cont dobutamine 7.5  --- appreciate Heart-Failure recs  --- daily weights: standing weights if possible  --- out of bed to chair, increase activity as tolerated  - Hypotension in setting of sepsis  --- Re-start home midodrine 30 TID  --- cont norepinephrine gtt 1.2 and vasopressin 0.04, wean as tolerated while maintaining MAP 65-70  - Atrial fibrillation, on home warfarin 3mg QD  --- cont amiodarone 100mg QD  --- check INR QD, dose warfarin QD  ------- INR 6/5 supratherapeutic to >7 in the setting of no bleeding. Continue to hold warfarin, no reversal unless bleeding.  --- cont newly started PPI QD for GI protection while INR supratherapeutic  - Hyperlipidemia --- cont atorvastatin 80 daily  - BP discrepancy between UEs vs LEs  --- BP in lower extremities is significantly higher than in upper extremities, consistent w prior CCU admission; we think that LE BP is likely more accurate than UE BP. F/u VA duplex of UEs did not reveal any significant arterial stenosis.   ---- RUE arterial line placed   - RIJ non-occlusive clot vs fibrin sheath associated w Perma-cath, seen on CT 6/3 --- monitor clinically  - Splenic infarct seen on CT 6/3, potentially to atrial fibrillation and recently sub-therapeutic INR --- supportive care, monitor clinically  - MACE risk reduction --- cont ASA    # Pulm  - Hypoxia, likely multifactorial --- c/w nasal cannula during day, BiLevel at night and PRN  - Pulmonary fibrosis  --- per pulm, do not anticipate benefit from steroids based on unsuccessful trial in past  - ALONZO --- BiLevel at night and PRN, patient minimally compliant with worsening mixed respiratory and metabolic acidosis.  - Pleural effusion, R-sided --- increased from prior CT, now small-to-moderate    # GI  - Probable cholecystitis -- Abdominal pain, R-sided, achy, associated w tenderness to palpation, CT A/P c/f cholecystitis  --- US RUQ: Sonographic Altamirano's sign negative  --- consider HIDA  ------- per discussion with patient and family by primary team and heart failure on 6/5pm, holding off on HIDA for now based on pt's aversion to additional invasive procedures (e.g., perc anurag or surgery)  --- onset coincides w weaning of dobutamine, but thought not likely due to hypoperfusion given that CO and CI were still adequate on multiple measurements   --- order simethicone if needed for gas  --- order acetaminophen if needed for pain  --- avoiding Maalox and other aluminum-hydroxide-containing products due to ESRD   - Nausea and vomiting  --- order ondansetron and/or metoclopramide if needed   --- abd xray with non-obstructive gas pattern, no free air.  - Constipation --- cont senna, bisacodyl, docusate; add'l agents PRN    #  - BPH   --- cont finasteride  --- clarify whether pt needs finasteride given that he is anuric    # Renal - ESRD on HD  --- C/w HD qMWF, as well as Saturday prn and add'l fluid-removal PRN  --- C/w Carrie-Tiffanie, Sevelamer  --- appreciate nephrology recs  --- may need removal of tunneled catheter and chronic PICC in the setting of sepsis.    # Endocr  - Diabetes mellitus type 2  --- off insulin for years  --- monitor glucose on BMP and blood gases  - Hypothyroidism  --- cont levothyroxine    # Infectious Disease   - Sepsis (suspected due to rectal fever, hi WBC, low BP): diff dx includes acute cholecystitis, pneumonia, diverticulitis/gut translocation, and/or CLABSI  --- cont vanc by level  --- cont pip/tazo, renally adjusted  --- f/u BCx (NGTD)  --- Procalcitonin elevated, but un-interpretable in setting of ESRD  --- appreciate ID recs  - HBV vaccination not up to date --- consider HBV vaccination this admission    # VTE ppx: warfarin held in the setting of supra therapeutic INR.   # Dispo: CCU  # GOC: full code, discussed multiple times this admission and in past, including with palliative care

## 2018-06-05 NOTE — PROGRESS NOTE ADULT - SUBJECTIVE AND OBJECTIVE BOX
Patient seen and examined. He states he still has abdominal pain 6/10. No acute SOB, CP, palpitations.   Dobutamine has been increased to 7.5 mcg/kg/min.    Medications:  ALBUTerol/ipratropium for Nebulization 3 milliLiter(s) Nebulizer every 6 hours PRN  amiodarone    Tablet 100 milliGRAM(s) Oral daily  atorvastatin 80 milliGRAM(s) Oral at bedtime  bisacodyl 5 milliGRAM(s) Oral at bedtime  chlorhexidine 4% Liquid 1 Application(s) Topical <User Schedule>  dextrose 40% Gel 15 Gram(s) Oral once PRN  dextrose 50% Injectable 12.5 Gram(s) IV Push once  dextrose 50% Injectable 25 Gram(s) IV Push once  dextrose 50% Injectable 25 Gram(s) IV Push once  DOBUTamine Infusion 7.5 MICROgram(s)/kG/Min IV Continuous <Continuous>  docusate sodium 100 milliGRAM(s) Oral two times a day  finasteride 5 milliGRAM(s) Oral daily  insulin lispro (HumaLOG) corrective regimen sliding scale   SubCutaneous three times a day before meals  insulin lispro (HumaLOG) corrective regimen sliding scale   SubCutaneous at bedtime  levothyroxine 75 MICROGram(s) Oral daily  norepinephrine Infusion 0.05 MICROgram(s)/kG/Min IV Continuous <Continuous>  pantoprazole    Tablet 40 milliGRAM(s) Oral before breakfast  piperacillin/tazobactam IVPB. 3.375 Gram(s) IV Intermittent every 12 hours  senna 2 Tablet(s) Oral at bedtime  sevelamer hydrochloride 1600 milliGRAM(s) Oral <User Schedule>  sodium chloride 0.65% Nasal 1 Spray(s) Both Nostrils four times a day  vasopressin Infusion 0.04 Unit(s)/Min IV Continuous <Continuous>      Vitals:  Vital Signs Last 24 Hrs  T(C): 37.1 (2018 08:35), Max: 37.1 (2018 08:35)  T(F): 98.7 (2018 08:35), Max: 98.7 (2018 08:35)  HR: 106 (2018 09:00) (61 - 115)  BP: 101/43 (2018 08:00) (81/44 - 136/66)  BP(mean): 55 (2018 08:00) (51 - 95)  RR: 19 (2018 09:00) (13 - 53)  SpO2: 96% (2018 09:00) (90% - 100%)    Daily     Daily Weight in k.3 (2018 04:57)    I&O's Detail    2018 07:01  -  2018 07:00  --------------------------------------------------------  IN:    DOBUTamine Infusion: 47.7 mL    DOBUTamine Infusion: 31.8 mL    Other: 400 mL  Total IN: 479.5 mL    OUT:    Other: 3900 mL  Total OUT: 3900 mL    Total NET: -3420.5 mL      2018 07:01  -  2018 10:45  --------------------------------------------------------  IN:    DOBUTamine Infusion: 48 mL    norepinephrine Infusion: 306 mL    Oral Fluid: 120 mL    vasopressin Infusion: 9.6 mL  Total IN: 483.6 mL    OUT:  Total OUT: 0 mL    Total NET: 483.6 mL          Physical Exam:     General: No distress. Comfortable.  HEENT: EOM intact.  Neck: Neck supple. JVP mildly elevated. No masses  Chest: Clear to auscultation bilaterally  CV: Normal S1 and S2. No murmurs, rub, or gallops. Radial pulses normal. No LE edema b/l.   Abdomen: Soft, non-distended, non-tender  Skin: No rashes or skin breakdown  Neurology: Alert and oriented times three. Sensation intact  Psych: Affect normal    Labs:                        12.4   15.19 )-----------( 160      ( 2018 05:00 )             39.6     06-05    128<L>  |  88<L>  |  29<H>  ----------------------------<  265<H>  4.4   |  22  |  4.58<H>    Ca    8.7      2018 05:00  Phos  2.8     06-  Mg     2.2     06-05    TPro  8.2  /  Alb  3.0<L>  /  TBili  0.6  /  DBili  x   /  AST  20  /  ALT  18  /  AlkPhos  224<H>  06-05    PT/INR - ( 2018 05:00 )   PT: 85.2 SEC;   INR: 7.37          PTT - ( 2018 05:00 )  PTT:65.0 SEC

## 2018-06-05 NOTE — PROGRESS NOTE ADULT - PROBLEM SELECTOR PLAN 1
-Currently on dobutamine 7.5 mcg/kg/min and norepinephrine. MAPs 65-70. Please target consistent MAP 70.  -No BB while on . Unable to tolerate ACEI/ARB/ARNI due to low BP.   -He has not been an advanced HF therapy (LVAD/transplant) candidate in past due to severe interstitial lung disease. After numerous attempts to optimize HF, pt still has symptoms of SOB- likely combination of both ADHF and severe interstitial disease. Palliative approach is most appropriate for this patient.   -Continue fluid removal via HD.

## 2018-06-06 DIAGNOSIS — I50.23 ACUTE ON CHRONIC SYSTOLIC (CONGESTIVE) HEART FAILURE: ICD-10-CM

## 2018-06-06 LAB
ALBUMIN SERPL ELPH-MCNC: 3.1 G/DL — LOW (ref 3.3–5)
ALP SERPL-CCNC: 224 U/L — HIGH (ref 40–120)
ALT FLD-CCNC: 16 U/L — SIGNIFICANT CHANGE UP (ref 4–41)
APTT BLD: 68.1 SEC — HIGH (ref 27.5–37.4)
AST SERPL-CCNC: 20 U/L — SIGNIFICANT CHANGE UP (ref 4–40)
BASE EXCESS BLDA CALC-SCNC: -8.3 MMOL/L — SIGNIFICANT CHANGE UP
BASE EXCESS BLDV CALC-SCNC: -7.7 MMOL/L — SIGNIFICANT CHANGE UP
BILIRUB SERPL-MCNC: 0.5 MG/DL — SIGNIFICANT CHANGE UP (ref 0.2–1.2)
BLOOD GAS VENOUS - CREATININE: 6.32 MG/DL — HIGH (ref 0.5–1.3)
BUN SERPL-MCNC: 36 MG/DL — HIGH (ref 7–23)
BUN SERPL-MCNC: 40 MG/DL — HIGH (ref 7–23)
CALCIUM SERPL-MCNC: 8.9 MG/DL — SIGNIFICANT CHANGE UP (ref 8.4–10.5)
CALCIUM SERPL-MCNC: 9 MG/DL — SIGNIFICANT CHANGE UP (ref 8.4–10.5)
CHLORIDE BLDV-SCNC: 100 MMOL/L — SIGNIFICANT CHANGE UP (ref 96–108)
CHLORIDE SERPL-SCNC: 87 MMOL/L — LOW (ref 98–107)
CHLORIDE SERPL-SCNC: 89 MMOL/L — LOW (ref 98–107)
CO2 SERPL-SCNC: 17 MMOL/L — LOW (ref 22–31)
CO2 SERPL-SCNC: 18 MMOL/L — LOW (ref 22–31)
CREAT SERPL-MCNC: 5.44 MG/DL — HIGH (ref 0.5–1.3)
CREAT SERPL-MCNC: 6.09 MG/DL — HIGH (ref 0.5–1.3)
GAS PNL BLDV: 125 MMOL/L — LOW (ref 136–146)
GLUCOSE BLDV-MCNC: 215 — HIGH (ref 70–99)
GLUCOSE SERPL-MCNC: 197 MG/DL — HIGH (ref 70–99)
GLUCOSE SERPL-MCNC: 253 MG/DL — HIGH (ref 70–99)
HCO3 BLDA-SCNC: 18 MMOL/L — LOW (ref 22–26)
HCO3 BLDV-SCNC: 17 MMOL/L — LOW (ref 20–27)
HCT VFR BLD CALC: 37.5 % — LOW (ref 39–50)
HCT VFR BLDV CALC: 37 % — LOW (ref 39–51)
HGB BLD-MCNC: 11.9 G/DL — LOW (ref 13–17)
HGB BLDV-MCNC: 12 G/DL — LOW (ref 13–17)
INR BLD: 7.61 — CRITICAL HIGH (ref 0.88–1.17)
LACTATE BLDA-SCNC: 1.5 MMOL/L — SIGNIFICANT CHANGE UP (ref 0.5–2)
LACTATE BLDV-MCNC: 2.4 MMOL/L — HIGH (ref 0.5–2)
MAGNESIUM SERPL-MCNC: 2.2 MG/DL — SIGNIFICANT CHANGE UP (ref 1.6–2.6)
MAGNESIUM SERPL-MCNC: 2.3 MG/DL — SIGNIFICANT CHANGE UP (ref 1.6–2.6)
MCHC RBC-ENTMCNC: 30.9 PG — SIGNIFICANT CHANGE UP (ref 27–34)
MCHC RBC-ENTMCNC: 31.7 % — LOW (ref 32–36)
MCV RBC AUTO: 97.4 FL — SIGNIFICANT CHANGE UP (ref 80–100)
NRBC # FLD: 0 — SIGNIFICANT CHANGE UP
PCO2 BLDA: 39 MMHG — SIGNIFICANT CHANGE UP (ref 35–48)
PCO2 BLDV: 54 MMHG — HIGH (ref 41–51)
PH BLDA: 7.27 PH — LOW (ref 7.35–7.45)
PH BLDV: 7.18 PH — CRITICAL LOW (ref 7.32–7.43)
PHOSPHATE SERPL-MCNC: 3.1 MG/DL — SIGNIFICANT CHANGE UP (ref 2.5–4.5)
PHOSPHATE SERPL-MCNC: 4.2 MG/DL — SIGNIFICANT CHANGE UP (ref 2.5–4.5)
PLATELET # BLD AUTO: 156 K/UL — SIGNIFICANT CHANGE UP (ref 150–400)
PMV BLD: 11.6 FL — SIGNIFICANT CHANGE UP (ref 7–13)
PO2 BLDA: 80 MMHG — LOW (ref 83–108)
PO2 BLDV: 35 MMHG — SIGNIFICANT CHANGE UP (ref 35–40)
POTASSIUM BLDV-SCNC: 4.6 MMOL/L — HIGH (ref 3.4–4.5)
POTASSIUM SERPL-MCNC: 4.6 MMOL/L — SIGNIFICANT CHANGE UP (ref 3.5–5.3)
POTASSIUM SERPL-MCNC: 4.9 MMOL/L — SIGNIFICANT CHANGE UP (ref 3.5–5.3)
POTASSIUM SERPL-SCNC: 4.6 MMOL/L — SIGNIFICANT CHANGE UP (ref 3.5–5.3)
POTASSIUM SERPL-SCNC: 4.9 MMOL/L — SIGNIFICANT CHANGE UP (ref 3.5–5.3)
PROT SERPL-MCNC: 8 G/DL — SIGNIFICANT CHANGE UP (ref 6–8.3)
PROTHROM AB SERPL-ACNC: 91.2 SEC — HIGH (ref 9.8–13.1)
RBC # BLD: 3.85 M/UL — LOW (ref 4.2–5.8)
RBC # FLD: 14.8 % — HIGH (ref 10.3–14.5)
SAO2 % BLDA: 93.8 % — LOW (ref 95–99)
SAO2 % BLDV: 49.6 % — LOW (ref 60–85)
SODIUM SERPL-SCNC: 126 MMOL/L — LOW (ref 135–145)
SODIUM SERPL-SCNC: 127 MMOL/L — LOW (ref 135–145)
SPECIMEN SOURCE: SIGNIFICANT CHANGE UP
WBC # BLD: 14.42 K/UL — HIGH (ref 3.8–10.5)
WBC # FLD AUTO: 14.42 K/UL — HIGH (ref 3.8–10.5)

## 2018-06-06 PROCEDURE — 99233 SBSQ HOSP IP/OBS HIGH 50: CPT

## 2018-06-06 PROCEDURE — 78226 HEPATOBILIARY SYSTEM IMAGING: CPT | Mod: 26,GC

## 2018-06-06 PROCEDURE — 99233 SBSQ HOSP IP/OBS HIGH 50: CPT | Mod: GC

## 2018-06-06 PROCEDURE — 99232 SBSQ HOSP IP/OBS MODERATE 35: CPT

## 2018-06-06 RX ORDER — FENTANYL CITRATE 50 UG/ML
12.5 INJECTION INTRAVENOUS ONCE
Qty: 0 | Refills: 0 | Status: DISCONTINUED | OUTPATIENT
Start: 2018-06-06 | End: 2018-06-06

## 2018-06-06 RX ORDER — MIDODRINE HYDROCHLORIDE 2.5 MG/1
20 TABLET ORAL ONCE
Qty: 0 | Refills: 0 | Status: COMPLETED | OUTPATIENT
Start: 2018-06-06 | End: 2018-06-06

## 2018-06-06 RX ORDER — PHYTONADIONE (VIT K1) 5 MG
5 TABLET ORAL ONCE
Qty: 0 | Refills: 0 | Status: DISCONTINUED | OUTPATIENT
Start: 2018-06-06 | End: 2018-06-06

## 2018-06-06 RX ORDER — PHYTONADIONE (VIT K1) 5 MG
10 TABLET ORAL ONCE
Qty: 0 | Refills: 0 | Status: DISCONTINUED | OUTPATIENT
Start: 2018-06-06 | End: 2018-06-06

## 2018-06-06 RX ORDER — PROTHROMBIN COMPLEX CONCENTRATE (HUMAN) 25.5; 16.5; 24; 22; 22; 26 [IU]/ML; [IU]/ML; [IU]/ML; [IU]/ML; [IU]/ML; [IU]/ML
1500 POWDER, FOR SOLUTION INTRAVENOUS ONCE
Qty: 0 | Refills: 0 | Status: DISCONTINUED | OUTPATIENT
Start: 2018-06-06 | End: 2018-06-06

## 2018-06-06 RX ORDER — MIDODRINE HYDROCHLORIDE 2.5 MG/1
30 TABLET ORAL THREE TIMES A DAY
Qty: 0 | Refills: 0 | Status: DISCONTINUED | OUTPATIENT
Start: 2018-06-06 | End: 2018-06-09

## 2018-06-06 RX ORDER — PHYTONADIONE (VIT K1) 5 MG
5 TABLET ORAL ONCE
Qty: 0 | Refills: 0 | Status: COMPLETED | OUTPATIENT
Start: 2018-06-06 | End: 2018-06-07

## 2018-06-06 RX ORDER — ONDANSETRON 8 MG/1
4 TABLET, FILM COATED ORAL ONCE
Qty: 0 | Refills: 0 | Status: COMPLETED | OUTPATIENT
Start: 2018-06-06 | End: 2018-06-06

## 2018-06-06 RX ADMIN — Medication 1 SPRAY(S): at 06:32

## 2018-06-06 RX ADMIN — FINASTERIDE 5 MILLIGRAM(S): 5 TABLET, FILM COATED ORAL at 12:25

## 2018-06-06 RX ADMIN — Medication 2: at 18:03

## 2018-06-06 RX ADMIN — Medication 79.88 MICROGRAM(S)/KG/MIN: at 09:31

## 2018-06-06 RX ADMIN — SEVELAMER CARBONATE 1600 MILLIGRAM(S): 2400 POWDER, FOR SUSPENSION ORAL at 12:28

## 2018-06-06 RX ADMIN — Medication 75 MICROGRAM(S): at 06:31

## 2018-06-06 RX ADMIN — PANTOPRAZOLE SODIUM 40 MILLIGRAM(S): 20 TABLET, DELAYED RELEASE ORAL at 06:31

## 2018-06-06 RX ADMIN — Medication 3: at 09:18

## 2018-06-06 RX ADMIN — CHLORHEXIDINE GLUCONATE 1 APPLICATION(S): 213 SOLUTION TOPICAL at 12:28

## 2018-06-06 RX ADMIN — VASOPRESSIN 2.4 UNIT(S)/MIN: 20 INJECTION INTRAVENOUS at 09:30

## 2018-06-06 RX ADMIN — SEVELAMER CARBONATE 1600 MILLIGRAM(S): 2400 POWDER, FOR SUSPENSION ORAL at 09:18

## 2018-06-06 RX ADMIN — Medication 15.97 MICROGRAM(S)/KG/MIN: at 09:30

## 2018-06-06 RX ADMIN — MIDODRINE HYDROCHLORIDE 30 MILLIGRAM(S): 2.5 TABLET ORAL at 13:56

## 2018-06-06 RX ADMIN — Medication 1 SPRAY(S): at 12:25

## 2018-06-06 RX ADMIN — AMIODARONE HYDROCHLORIDE 100 MILLIGRAM(S): 400 TABLET ORAL at 06:31

## 2018-06-06 RX ADMIN — PIPERACILLIN AND TAZOBACTAM 25 GRAM(S): 4; .5 INJECTION, POWDER, LYOPHILIZED, FOR SOLUTION INTRAVENOUS at 06:30

## 2018-06-06 RX ADMIN — Medication 100 MILLIGRAM(S): at 06:31

## 2018-06-06 RX ADMIN — PIPERACILLIN AND TAZOBACTAM 25 GRAM(S): 4; .5 INJECTION, POWDER, LYOPHILIZED, FOR SOLUTION INTRAVENOUS at 18:03

## 2018-06-06 RX ADMIN — MIDODRINE HYDROCHLORIDE 20 MILLIGRAM(S): 2.5 TABLET ORAL at 06:52

## 2018-06-06 RX ADMIN — Medication 1 SPRAY(S): at 18:04

## 2018-06-06 RX ADMIN — ONDANSETRON 4 MILLIGRAM(S): 8 TABLET, FILM COATED ORAL at 16:00

## 2018-06-06 RX ADMIN — MIDODRINE HYDROCHLORIDE 10 MILLIGRAM(S): 2.5 TABLET ORAL at 06:31

## 2018-06-06 RX ADMIN — Medication 1 SPRAY(S): at 01:15

## 2018-06-06 RX ADMIN — Medication 3: at 12:29

## 2018-06-06 NOTE — PROGRESS NOTE ADULT - ASSESSMENT
63 year old with heart failure with hypotnesion (requiring pressors/ inotrophs), leukocytosis and abdominal pain.    His pain localizes to the right side and his imaging raises a concern for a thickened gallbladder.    Concern for cholecystitis.    We would advise a HIDA and surgical evaluation.  Pt is refusing HIDA scan.    Antibiotics may not be sufficient if this is cholecystis.    Continue zosyn/ vanco by level.    Consider surgery eval.    If pt does not desire aggressive measures/ interventions, consider goals of care discussion.

## 2018-06-06 NOTE — PROGRESS NOTE ADULT - PROBLEM SELECTOR PLAN 1
Is hypotensive today: on vasopressin as well as norepinephrine to map > 65 mm Hg.  6/6: has suspicion of cholecystitis: Pt is refusing for HIDA scan: He is on broad spectrum antibiotics: Defer to surgery and primary tea for the same: Cont supportive treatment: He remans on vasopressors at this time:"

## 2018-06-06 NOTE — PROGRESS NOTE ADULT - PROBLEM SELECTOR PLAN 2
6/3 febrile last night. WBC went further up. Empiric antibiotics started by CCU team.  6/5: on broad spectrum antibiotics: ID following CT chest noted:  6/6: ? sc cholecystitis: cont antibiotics:

## 2018-06-06 NOTE — PROGRESS NOTE ADULT - ATTENDING COMMENTS
events noted: Had a detailed discussion with the sister: Given my office number too: She will get me the official lung biopsy report from [place]:  6/4: As above:  6/5: Doing poorly: in shock and on multiple pressors with dobutamine though alert and awake: Cont current supportive care:  6/6: pt is acutely ill , and now with suspicion of acute cholecystitis: Pt has been refusing fro HIDA scan: On antibiotics: cont current supportive care in CCU:

## 2018-06-06 NOTE — PROGRESS NOTE ADULT - SUBJECTIVE AND OBJECTIVE BOX
SUBJECTIVE:  Sitting up at side of bed, feels tired    MEDICATIONS:  amiodarone    Tablet 100 milliGRAM(s) Oral daily  DOBUTamine Infusion 7.5 MICROgram(s)/kG/Min IV Continuous <Continuous>  midodrine 30 milliGRAM(s) Oral three times a day  norepinephrine Infusion 1.2 MICROgram(s)/kG/Min IV Continuous <Continuous>    piperacillin/tazobactam IVPB. 3.375 Gram(s) IV Intermittent every 12 hours    ALBUTerol/ipratropium for Nebulization 3 milliLiter(s) Nebulizer every 6 hours PRN      bisacodyl 5 milliGRAM(s) Oral at bedtime  docusate sodium 100 milliGRAM(s) Oral two times a day  pantoprazole    Tablet 40 milliGRAM(s) Oral before breakfast  senna 2 Tablet(s) Oral at bedtime    atorvastatin 80 milliGRAM(s) Oral at bedtime  dextrose 40% Gel 15 Gram(s) Oral once PRN  dextrose 50% Injectable 12.5 Gram(s) IV Push once  dextrose 50% Injectable 25 Gram(s) IV Push once  dextrose 50% Injectable 25 Gram(s) IV Push once  finasteride 5 milliGRAM(s) Oral daily  insulin lispro (HumaLOG) corrective regimen sliding scale   SubCutaneous three times a day before meals  insulin lispro (HumaLOG) corrective regimen sliding scale   SubCutaneous at bedtime  levothyroxine 75 MICROGram(s) Oral daily  vasopressin Infusion 0.04 Unit(s)/Min IV Continuous <Continuous>    Biotene Dry Mouth Oral Rinse 5 milliLiter(s) Swish and Spit four times a day PRN  chlorhexidine 4% Liquid 1 Application(s) Topical <User Schedule>  sodium chloride 0.65% Nasal 1 Spray(s) Both Nostrils four times a day      REVIEW OF SYSTEMS:    CONSTITUTIONAL: No fever, weight loss, or fatigue  EYES: No eye pain, visual disturbances, or discharge  NECK: No pain or stiffness  RESPIRATORY: No cough, wheezing, chills or hemoptysis; No Shortness of Breath  CARDIOVASCULAR: No chest pain, palpitations, dizziness, or leg swelling  GASTROINTESTINAL: No abdominal or epigastric pain. No nausea, vomiting, or hematemesis; No diarrhea or constipation. No melena or hematochezia.  GENITOURINARY: No dysuria, frequency, hematuria, or incontinence  NEUROLOGICAL: No headaches, memory loss, loss of strength, numbness, or tremors  SKIN: No itching, burning, rashes, or lesions   LYMPH Nodes: No enlarged glands  MUSCULOSKELETAL: No joint pain or swelling; No muscle, back, or extremity pain  All other review of systems are negative.  	  [ ] Unable to obtain    PHYSICAL EXAM:  T(C): 25.4 (06-06-18 @ 08:00), Max: 37.2 (06-05-18 @ 20:00)  HR: 108 (06-06-18 @ 07:49) (94 - 120)  BP: 128/74 (06-06-18 @ 06:00) (92/54 - 140/102)  RR: 27 (06-06-18 @ 06:00) (17 - 31)  SpO2: 99% (06-06-18 @ 07:49) (84% - 100%)  Wt(kg): --  I&O's Summary    05 Jun 2018 07:01  -  06 Jun 2018 07:00  --------------------------------------------------------  IN: 1605.2 mL / OUT: 2000 mL / NET: -394.8 mL          PHYSICAL EXAM    Appearance: Fatigued/Chronically ill appearing  HEENT:   Normal oral mucosa, PERRL, EOMI	  NECK: Soft and supple, No LAD, No JVD  Cardiovascular: Irregular Rate and Rhythm, Normal S1 S2, II/VI SM  Respiratory: decreased BS at bases B/L 	  Gastrointestinal:  Soft, Non-tender, + BS	  Skin: No rashes, No ecchymoses, No cyanosis  Neurologic: Non-focal  Extremities: No clubbing, cyanosis or edema  Vascular: Peripheral pulses palpable 2+ bilaterally    LABS:	 	                              11.9   14.42 )-----------( 156      ( 06 Jun 2018 03:05 )             37.5     06-06    126<L>  |  87<L>  |  36<H>  ----------------------------<  253<H>  4.6   |  18<L>  |  5.44<H>    Ca    8.9      06 Jun 2018 03:05  Phos  3.1     06-06  Mg     2.2     06-06    TPro  8.0  /  Alb  3.1<L>  /  TBili  0.5  /  DBili  x   /  AST  20  /  ALT  16  /  AlkPhos  224<H>  06-06

## 2018-06-06 NOTE — PROGRESS NOTE ADULT - SUBJECTIVE AND OBJECTIVE BOX
Patient is a 63y old  Male who presents with a chief complaint of SOB (29 May 2018 15:55)      Any change in ROS: Events noted:  on bipap at thie time:  he is alert and awake at this time:     MEDICATIONS  (STANDING):  amiodarone    Tablet 100 milliGRAM(s) Oral daily  atorvastatin 80 milliGRAM(s) Oral at bedtime  bisacodyl 5 milliGRAM(s) Oral at bedtime  chlorhexidine 4% Liquid 1 Application(s) Topical <User Schedule>  dextrose 50% Injectable 12.5 Gram(s) IV Push once  dextrose 50% Injectable 25 Gram(s) IV Push once  dextrose 50% Injectable 25 Gram(s) IV Push once  DOBUTamine Infusion 7.5 MICROgram(s)/kG/Min (15.975 mL/Hr) IV Continuous <Continuous>  docusate sodium 100 milliGRAM(s) Oral two times a day  finasteride 5 milliGRAM(s) Oral daily  insulin lispro (HumaLOG) corrective regimen sliding scale   SubCutaneous three times a day before meals  insulin lispro (HumaLOG) corrective regimen sliding scale   SubCutaneous at bedtime  levothyroxine 75 MICROGram(s) Oral daily  midodrine 30 milliGRAM(s) Oral three times a day  norepinephrine Infusion 1.2 MICROgram(s)/kG/Min (79.875 mL/Hr) IV Continuous <Continuous>  ondansetron Injectable 4 milliGRAM(s) IV Push once  pantoprazole    Tablet 40 milliGRAM(s) Oral before breakfast  piperacillin/tazobactam IVPB. 3.375 Gram(s) IV Intermittent every 12 hours  senna 2 Tablet(s) Oral at bedtime  sevelamer hydrochloride 1600 milliGRAM(s) Oral <User Schedule>  sodium chloride 0.65% Nasal 1 Spray(s) Both Nostrils four times a day  vasopressin Infusion 0.04 Unit(s)/Min (2.4 mL/Hr) IV Continuous <Continuous>    MEDICATIONS  (PRN):  ALBUTerol/ipratropium for Nebulization 3 milliLiter(s) Nebulizer every 6 hours PRN Shortness of Breath and/or Wheezing  Biotene Dry Mouth Oral Rinse 5 milliLiter(s) Swish and Spit four times a day PRN dry mouth  dextrose 40% Gel 15 Gram(s) Oral once PRN Blood Glucose LESS THAN 70 milliGRAM(s)/deciliter    Vital Signs Last 24 Hrs  T(C): 36.7 (06 Jun 2018 12:00), Max: 37.2 (05 Jun 2018 20:00)  T(F): 98.1 (06 Jun 2018 12:00), Max: 98.9 (05 Jun 2018 20:00)  HR: 117 (06 Jun 2018 14:00) (101 - 119)  BP: 106/64 (06 Jun 2018 14:00) (103/75 - 140/102)  BP(mean): 69 (06 Jun 2018 14:00) (69 - 113)  RR: 29 (06 Jun 2018 14:00) (8 - 31)  SpO2: 91% (06 Jun 2018 14:00) (64% - 100%)    I&O's Summary    05 Jun 2018 07:01  -  06 Jun 2018 07:00  --------------------------------------------------------  IN: 1605.2 mL / OUT: 2000 mL / NET: -394.8 mL    06 Jun 2018 07:01  -  06 Jun 2018 14:27  --------------------------------------------------------  IN: 680.5 mL / OUT: 0 mL / NET: 680.5 mL          Physical Exam:   GENERAL: NAD, well-groomed, well-developed  HEENT: BELL/   Atraumatic, Normocephalic  ENMT: No tonsillar erythema, exudates, or enlargement; Moist mucous membranes, Good dentition, No lesions  NECK: Supple, No JVD, Normal thyroid  CHEST/LUNG: bilateral crackles+   CVS: Regular rate and rhythm; No murmurs, rubs, or gallops  GI: : Soft, Nontender, Nondistended; Bowel sounds present  NERVOUS SYSTEM:  Alert & Oriented X3  EXTREMITIES:+edema  LYMPH: No lymphadenopathy noted  SKIN: No rashes or lesions  ENDOCRINOLOGY: No Thyromegaly  PSYCH: Appropriate    Labs:  ABG - ( 06 Jun 2018 12:40 )  pH, Arterial: 7.27  pH, Blood: x     /  pCO2: 39    /  pO2: 80    / HCO3: 18    / Base Excess: -8.3  /  SaO2: 93.8            20, 22, 26, 26, 24, 23, 24, 24, 27, 23                            11.9   14.42 )-----------( 156      ( 06 Jun 2018 03:05 )             37.5                         12.4   15.19 )-----------( 160      ( 05 Jun 2018 05:00 )             39.6                         12.3   16.02 )-----------( 152      ( 04 Jun 2018 19:33 )             39.7                         12.7   15.07 )-----------( 148      ( 04 Jun 2018 04:20 )             39.7                         12.9   25.43 )-----------( 132      ( 03 Jun 2018 05:00 )             42.8     06-06    126<L>  |  87<L>  |  36<H>  ----------------------------<  253<H>  4.6   |  18<L>  |  5.44<H>  06-05    128<L>  |  88<L>  |  29<H>  ----------------------------<  265<H>  4.4   |  22  |  4.58<H>  06-04    127<L>  |  89<L>  |  26<H>  ----------------------------<  359<H>  4.2   |  23  |  4.06<H>  06-04    126<L>  |  88<L>  |  45<H>  ----------------------------<  223<H>  5.2   |  20<L>  |  6.49<H>  06-03    126<L>  |  88<L>  |  34<H>  ----------------------------<  312<H>  4.6   |  21<L>  |  5.38<H>    Ca    8.9      06 Jun 2018 03:05  Ca    8.7      05 Jun 2018 05:00  Ca    8.6      04 Jun 2018 19:33  Phos  3.1     06-06  Phos  2.8     06-05  Phos  2.4     06-04  Mg     2.2     06-06  Mg     2.2     06-05  Mg     2.1     06-04    TPro  8.0  /  Alb  3.1<L>  /  TBili  0.5  /  DBili  x   /  AST  20  /  ALT  16  /  AlkPhos  224<H>  06-06  TPro  8.2  /  Alb  3.0<L>  /  TBili  0.6  /  DBili  x   /  AST  20  /  ALT  18  /  AlkPhos  224<H>  06-05  TPro  7.7  /  Alb  2.7<L>  /  TBili  0.6  /  DBili  x   /  AST  20  /  ALT  18  /  AlkPhos  213<H>  06-04  TPro  7.8  /  Alb  2.7<L>  /  TBili  0.5  /  DBili  x   /  AST  15  /  ALT  15  /  AlkPhos  194<H>  06-04  TPro  8.1  /  Alb  2.8<L>  /  TBili  0.5  /  DBili  x   /  AST  20  /  ALT  16  /  AlkPhos  205<H>  06-03    CAPILLARY BLOOD GLUCOSE      POCT Blood Glucose.: 297 mg/dL (06 Jun 2018 12:15)  POCT Blood Glucose.: 266 mg/dL (06 Jun 2018 09:17)  POCT Blood Glucose.: 289 mg/dL (05 Jun 2018 21:56)  POCT Blood Glucose.: 360 mg/dL (05 Jun 2018 18:22)      LIVER FUNCTIONS - ( 06 Jun 2018 03:05 )  Alb: 3.1 g/dL / Pro: 8.0 g/dL / ALK PHOS: 224 u/L / ALT: 16 u/L / AST: 20 u/L / GGT: x           PT/INR - ( 06 Jun 2018 03:05 )   PT: 91.2 SEC;   INR: 7.61          PTT - ( 06 Jun 2018 03:05 )  PTT:68.1 SEC    Procalcitonin, Serum: 8.77 ng/mL (06-04 @ 19:30)  Lactate, Blood: 0.7 mmol/L (06-04 @ 11:30)        RECENT CULTURES:  06-04 @ 05:38 BLOOD PERIPHERAL                  NO ORGANISMS ISOLATED  NO ORGANISMS ISOLATED AT 48 HRS.  06-02 @ 15:34 BLOOD VENOUS                  NO ORGANISMS ISOLATED  NO ORGANISMS ISOLATED AT 72 HRS.  06-02 @ 10:56 BLOOD-CATHETER                  NO ORGANISMS ISOLATED  NO ORGANISMS ISOLATED AT 96 HOURS        RESPIRATORY CULTURES:          Studies  Chest X-RAY  CT SCAN Chest   Venous Dopplers: LE:   CT Abdomen  Others    < from: Xray Chest 1 View- PORTABLE-Urgent (06.04.18 @ 20:22) >  from Tami 3, 2018.    TECHNIQUE:   AP Portable chest x-ray.    INTERPRETATION:     Heart size cannot be accurately evaluated on this image however the   cardiac silhouette isprobably enlarged.  A left chest wall ICD is again seen. The generator obscures part of the   left lung.  A right IJ catheter is unchanged in position.  Bilateral increased fine reticular opacities consistent with history of   interstitial lung disease are again noted. Superimposed edema and/or   infection is not excluded.  Small loculated right pleural effusion with likely associated passive   atelectasis is not significantly changed.  No pneumothorax seen.              IMPRESSION:  No significant interval change.    Bilateral increased fine reticular opacities consistent with history of   interstitial lung disease are again noted. Superimposed edema and/or   infection are not excluded.    Unchanged small loculated right pleural effusion with likely associated   passive atelectasis.                  FLORENCE BRANNON M.D., ATTENDING RADIOLOGIST  This document has been electronically signed. Jun 5 2018  8:31AM    < end of copied text >    < from: CT Abdomen and Pelvis w/ IV Cont (06.03.18 @ 21:40) >  VESSELS:  Within normal limits.  RETROPERITONEUM: No lymphadenopathy.    ABDOMINAL WALL: Within normal limits.  BONES: Mild bilateral hip arthrosis.    The interstitial lung disease, right pleural effusion and gallbladder   findings were discussed by Dr. Hansen with Dr. Ortiz on 6/3/2018 at 11:07   PM. Additional findings were discussed by Dr. Marr with Dr. Calhoun on 6/4/2018 at 12:15 PM.    IMPRESSION:   Small right pleural effusion which has increased in size since 3/28/2018.  Interstitial lung disease with ground glass opacities which have   increased slightly in density since 3/22/2018.  Distended gallbladder with wall thickening and sludge or small stones,   with nonspecific pericholecystic fluid. These findings can be seen in the   setting of acute cholecystitis. Ultrasound or HIDA scan is recommended   for further evaluation.  Small splenic infarct.  Rounded low density region within the right internal jugular vein above a   central venous catheter which could represent a nonocclusive clot or   fibrin sheath.                  EVERETTE MARR M.D., ATTENDING RADIOLOGIST  This document has been electronically signed. Jun 4 2018 12:22PM        < end of copied text >

## 2018-06-06 NOTE — CONSULT NOTE ADULT - ASSESSMENT
63M severe CHF, on chronic dobutamine gtt at home x 3 years (follows up with Dr. Davis), AFib on coumadin, AICD, ESRD on HD MWF (plus add'l session on Saturday prn), COPD (on home O2), and pulmonary fibrosis, general surgery consulted today for acute cholecystitis.    -would consult IR for STAT percutaneous cholecystostomy tube  -goals of care, would involve wife in discussions as per palliative/ make certain to assess capacity  -page [phone] with surgical questions

## 2018-06-06 NOTE — CONSULT NOTE ADULT - SUBJECTIVE AND OBJECTIVE BOX
GENERAL SURGERY CONSULT NOTE    63M severe CHF, on chronic dobutamine gtt at home x 3 years (follows up with Dr. Davis), AFib on coumadin, AICD, ESRD on HD MWF (plus add'l session on Saturday prn), COPD (on home O2), and pulmonary fibrosis, general surgery consulted today for acute cholecystitis.  As per team, patient has had increasing pressor requirements and has been complaining of abdominal pain, worse when eating over the past couple of days.   When seen, patient on 1.4 of levo, .04 of vaso, and 7.5 of dobutamine with a heart rate of 110, on a facemask. On exam, patient with significant right upper quadrant pain with guarding, rest of abdomen benign. Labs significant for WBC of 14, alk phos 224, normal other liver function, INR 7.6, lactate yesterday of 1.4. Biliary imaging includes ultrasound on 6/4 showing stones and sludge, CT scan on 6/3 showing distended gallbladder with wall thickening and pericholecystic fluid.       HPI:  Patient seen and examined at the bedside. Patient transferred to general medical floor with significant dyspnea which manifests with minimal activity. Therefore, comprehensive HPI was not possible to elicit at this time. Briefly, 62 y/o M PMHx significant for severe CHF, on chronic dobutamine gtt at home x 3 years (follows up with Dr. Davis), AFib on coumadin, AICD, ESRD on HD MWF (plus add'l session on Saturday prn), COPD (on home O2), pulmonary fibrosis, presents to the ED with chief complaint of epistaxis that started yesterday. Patient reports intermittent nose bleed that responded to direct pressure. However, bleeding would soon recur afterwards. He also reports chronic SOB with minimal exertion, such as transferring himself between chairs and minimal walking. He says this has been bothering him "all the time." No CP, cough or fever. Patient had an additional round of HD yesterday.     In the ED, vitals were: Temp 97.5F, 70s-90s SBP, HR WNL, and tachypnea to RR 24. Patient given midodrine 10mg x 1. (27 May 2018 17:23)        PAST MEDICAL & SURGICAL HISTORY:  Seizure: Off Keppra since 7/2017  Chronic hypotension  AF (atrial fibrillation)  COPD (chronic obstructive pulmonary disease): 4L home O2  HLD (hyperlipidemia)  DM (diabetes mellitus): Off Insulin since 7/2017  ESRD (end stage renal disease) on dialysis  BPH (benign prostatic hypertrophy)  Myocardial infarction: 10/2011  Gout  CHF (congestive heart failure)  AICD (automatic cardioverter/defibrillator) present: Biot[employer] - placed 9/11/09  H/O coronary angiogram      FAMILY HISTORY:  No pertinent family history in first degree relatives    SOCIAL HISTORY:    MEDICATIONS  (STANDING):  amiodarone    Tablet 100 milliGRAM(s) Oral daily  atorvastatin 80 milliGRAM(s) Oral at bedtime  bisacodyl 5 milliGRAM(s) Oral at bedtime  chlorhexidine 4% Liquid 1 Application(s) Topical <User Schedule>  dextrose 50% Injectable 12.5 Gram(s) IV Push once  dextrose 50% Injectable 25 Gram(s) IV Push once  dextrose 50% Injectable 25 Gram(s) IV Push once  DOBUTamine Infusion 7.5 MICROgram(s)/kG/Min (15.975 mL/Hr) IV Continuous <Continuous>  docusate sodium 100 milliGRAM(s) Oral two times a day  finasteride 5 milliGRAM(s) Oral daily  insulin lispro (HumaLOG) corrective regimen sliding scale   SubCutaneous three times a day before meals  insulin lispro (HumaLOG) corrective regimen sliding scale   SubCutaneous at bedtime  levothyroxine 75 MICROGram(s) Oral daily  midodrine 30 milliGRAM(s) Oral three times a day  norepinephrine Infusion 1.2 MICROgram(s)/kG/Min (79.875 mL/Hr) IV Continuous <Continuous>  pantoprazole    Tablet 40 milliGRAM(s) Oral before breakfast  piperacillin/tazobactam IVPB. 3.375 Gram(s) IV Intermittent every 12 hours  senna 2 Tablet(s) Oral at bedtime  sevelamer hydrochloride 1600 milliGRAM(s) Oral <User Schedule>  sodium chloride 0.65% Nasal 1 Spray(s) Both Nostrils four times a day  vasopressin Infusion 0.04 Unit(s)/Min (2.4 mL/Hr) IV Continuous <Continuous>    MEDICATIONS  (PRN):  ALBUTerol/ipratropium for Nebulization 3 milliLiter(s) Nebulizer every 6 hours PRN Shortness of Breath and/or Wheezing  Biotene Dry Mouth Oral Rinse 5 milliLiter(s) Swish and Spit four times a day PRN dry mouth  dextrose 40% Gel 15 Gram(s) Oral once PRN Blood Glucose LESS THAN 70 milliGRAM(s)/deciliter    Allergies    No Known Allergies    Intolerances    PHYSICAL EXAM     Vital Signs Last 24 Hrs  T(C): 36.7 (06 Jun 2018 12:00), Max: 37.2 (05 Jun 2018 20:00)  T(F): 98.1 (06 Jun 2018 12:00), Max: 98.9 (05 Jun 2018 20:00)  HR: 106 (06 Jun 2018 12:30) (102 - 119)  BP: 105/57 (06 Jun 2018 12:00) (103/75 - 140/102)  BP(mean): 69 (06 Jun 2018 12:00) (69 - 113)  RR: 24 (06 Jun 2018 12:30) (8 - 31)  SpO2: 93% (06 Jun 2018 12:30) (64% - 100%)  Daily     Daily                               11.9   14.42 )-----------( 156      ( 06 Jun 2018 03:05 )             37.5     06-06    126<L>  |  87<L>  |  36<H>  ----------------------------<  253<H>  4.6   |  18<L>  |  5.44<H>    Ca    8.9      06 Jun 2018 03:05  Phos  3.1     06-06  Mg     2.2     06-06    TPro  8.0  /  Alb  3.1<L>  /  TBili  0.5  /  DBili  x   /  AST  20  /  ALT  16  /  AlkPhos  224<H>  06-06    PT/INR - ( 06 Jun 2018 03:05 )   PT: 91.2 SEC;   INR: 7.61          PTT - ( 06 Jun 2018 03:05 )  PTT:68.1 SEC      IMAGING STUDIES:

## 2018-06-06 NOTE — CONSULT NOTE ADULT - ATTENDING COMMENTS
I have personally interviewed and examined this patient, reviewed pertinent labs and imaging, and discussed the case with colleagues, residents, and physician assistants on B Team rounds.    Multisystem organ failure. With RUQ pain, elevated alk phos, distended gallbladder. Would treat as acute cholecystitis without further confirmation (eg HIDA)  advise stat percutaneous cholecystostomy by IR  likely will need Kcentra for reversal prior to procedure  keep NPO  goals of care discussion      The Acute Care Surgery (B Team) Attending Group Practice:  Dr. Black Martino, Dr. Khloe George, Dr. Ruddy Thomson, Dr. Regla Talbert, Dr. Tristan Curtis    urgent issues - spectra [phone] or [phone]  nonurgent issues - [phone]  patient appointments or afterhours - [phone]

## 2018-06-06 NOTE — PROGRESS NOTE ADULT - PROBLEM SELECTOR PLAN 2
w/shock- BP low, asympt, chronic low   on Vasopressin now, per CCU   Dobutamine drip as per HF team/CCU  on Midodrine 30mg tid restarted

## 2018-06-06 NOTE — PROGRESS NOTE ADULT - SUBJECTIVE AND OBJECTIVE BOX
[facility] - Mark / Khai PADRON /Jennifer/ VITO García/ VITO Leigh/ Kolton Farr / LISSA Njeru  ---------------------------------------------------------------------------------------------------------------    Patient seen and examined bedside    Subjective and Objective: Denied any complaints today. sob persists  placed on BiPAP  remains on Dobutamine and Vasopressin. had extra PUF yesterday 2L removed, tolerated well    Allergies: No Known Allergies      Hospital Medications:   MEDICATIONS  (STANDING):  amiodarone    Tablet 100 milliGRAM(s) Oral daily  atorvastatin 80 milliGRAM(s) Oral at bedtime  bisacodyl 5 milliGRAM(s) Oral at bedtime  chlorhexidine 4% Liquid 1 Application(s) Topical <User Schedule>  dextrose 50% Injectable 12.5 Gram(s) IV Push once  dextrose 50% Injectable 25 Gram(s) IV Push once  dextrose 50% Injectable 25 Gram(s) IV Push once  DOBUTamine Infusion 7.5 MICROgram(s)/kG/Min (15.975 mL/Hr) IV Continuous <Continuous>  docusate sodium 100 milliGRAM(s) Oral two times a day  finasteride 5 milliGRAM(s) Oral daily  insulin lispro (HumaLOG) corrective regimen sliding scale   SubCutaneous three times a day before meals  insulin lispro (HumaLOG) corrective regimen sliding scale   SubCutaneous at bedtime  levothyroxine 75 MICROGram(s) Oral daily  midodrine 30 milliGRAM(s) Oral three times a day  norepinephrine Infusion 1.2 MICROgram(s)/kG/Min (79.875 mL/Hr) IV Continuous <Continuous>  pantoprazole    Tablet 40 milliGRAM(s) Oral before breakfast  piperacillin/tazobactam IVPB. 3.375 Gram(s) IV Intermittent every 12 hours  senna 2 Tablet(s) Oral at bedtime  sevelamer hydrochloride 1600 milliGRAM(s) Oral <User Schedule>  sodium chloride 0.65% Nasal 1 Spray(s) Both Nostrils four times a day  vasopressin Infusion 0.04 Unit(s)/Min (2.4 mL/Hr) IV Continuous <Continuous>    VITALS:  T(F): 99.9 (06-06-18 @ 16:00), Max: 99.9 (06-06-18 @ 16:00)  HR: 114 (06-06-18 @ 17:00)  BP: 108/64 (06-06-18 @ 16:00)  RR: 21 (06-06-18 @ 17:00)  SpO2: 95% (06-06-18 @ 17:00)  Wt(kg): --    06-05 @ 07:01  -  06-06 @ 07:00  --------------------------------------------------------  IN: 1605.2 mL / OUT: 2000 mL / NET: -394.8 mL    06-06 @ 07:01  -  06-06 @ 17:22  --------------------------------------------------------  IN: 922.2 mL / OUT: 0 mL / NET: 922.2 mL      PHYSICAL EXAM:  Constitutional: NAD  HEENT: anicteric sclera, oropharynx clear  Neck: No JVD  Respiratory: dec bibasilar BS, no wheezes  Cardiovascular: S1, S2, RRR  Gastrointestinal: BS+, soft, NT/ND  Extremities: No cyanosis or clubbing. No peripheral edema  Neurological: A/O x 3, no focal deficits  Psychiatric: Normal mood, normal affect  : No CVA tenderness. No rosa.   Skin: No rashes  Vascular Access: rt IJ PC    LABS:  06-06    126<L>  |  87<L>  |  36<H>  ----------------------------<  253<H>  4.6   |  18<L>  |  5.44<H>    Ca    8.9      06 Jun 2018 03:05  Phos  3.1     06-06  Mg     2.2     06-06    TPro  8.0  /  Alb  3.1<L>  /  TBili  0.5  /  DBili      /  AST  20  /  ALT  16  /  AlkPhos  224<H>  06-06    Creatinine Trend: 5.44 <--, 4.58 <--, 4.06 <--, 6.49 <--, 5.38 <--, 4.20 <--, 4.87 <--, 3.73 <--                        11.9   14.42 )-----------( 156      ( 06 Jun 2018 03:05 )             37.5     Urine Studies:        RADIOLOGY & ADDITIONAL STUDIES:

## 2018-06-06 NOTE — PROGRESS NOTE ADULT - ASSESSMENT
64 y/o male w/ PMHX of MI in past (Per patient, [facility]) no stents, HFrEF (LVEF 15%, LVIDd 6.0 cm, w/ AICD) severe TR, on chronic home dobutamine 7.5 mcg/kg/min,  ESRD on HD (3-4 x a week), HLD, DM II, a.fib on coumadin, interstitial pulmonary lung disease on chronic home O2, hypotension on midodrine comes in with epistaxis. Epistaxis is now resolved. He was found to be hypervolemic, and was given UF the same night, and HD the next day. He will have HD today as well. He has been admitted 6 times this year for various reasons, but mostly for SOB.  RHC 17 on 2.5 mcg/kg/min of dobutamine 2.5 mcg/kg/min showed RA 25, PCWP 25, PA 61/29/39, PA sat 42.8%, CI 1.65, CO 3.60, PVR 3.84. Currently He is on  7.5 mcg/kg/min. He admits to SOB at rest sometimes, but mostly with minimal exertion (typically walking 10 steps), orthopnea. No CP, palpitations, dizziness or syncope. ICD does not reveal any events. Patient had RHC and showed RA 14-20, PCWP 22, PA 44/18/28, AO sat 92%, PA sat 58.3%, CO 4.66, CI 2.46, -based on these numberse, Dobutamine was decreased to 5 mcg/kg/min on 18. Hemodynamics on - CVP 8, PA sat 63.6%,CO 5.25, CI 2.76. Dobutamine was further decreased to 2.5 mcg/kg/min, but patient experienced abdominal pain, dobutamine was increased back to 5 mcg/kg/min on  evening. Dobutamine was further weaned down to 2.5 mcg/kg/min. Abdominal pain was investigated via CT abdomen, pelvis-possible acute cholecystitis suspected. Pt was febrile and had leukocytosis, emperic coverage w/ vanco and Zosyn initiated. HD continued. Palliative care team consult called.

## 2018-06-06 NOTE — PROGRESS NOTE ADULT - ASSESSMENT
63yM w severe HFrEF (on dobutamine gtt x3 yrs) w [employer] AICD, A-fib (on warfarin), ESRD on HD MWF (+Sat PRN), pulmonary fibrosis (on home O2), on  p/w epistaxis x1 day, also w acute-on-chronic SOB, found to be volume-overloaded despite reported med compliance, no dietary changes, and having gotten extra session of HD the day prior to admission. SBP 80s-90s in UEs, so in order to be monitored while undergoing fluid removal by HD/UF, was transferred to CCU.     Since admission, dyspnea improved.  gtt has been adjusted from 7.5 as low as 2.5, based on RHC measurements ([place] in place -). Course c/b abdominal pain and nausea, rising WBC, rectal temp >101, hypotension; BCx neg to date. Diff Dx for sepsis includes cholecystitis (favored by R-sided pain+tenderness and CT A/P, disfavored by LFTs wnl, US RUQ w sonographic Altamirano's negative), gut translocation of GI shelley (favored by R-sided pain), pneumonia (favored by worsening CT chest and by worsening resp status), and/or central-line-associated bloodstream infection (favored by temporal association w presence of R femoral line).      # Neuro - lethargy in setting of sepsis    # ENT - epistaxis, resolved; attachment for home humidification of nasal cannula sent to patient's home  --- continue humidification of nasal cannula, nasal saline spray  --- facilitate patient's getting simple mask to use for supplemental O2 at home    # Cardiac  - Severe bi-ventricular systolic heart failure w AICD in place, on chronic dobutamine gtt 7.5, LVEF 15% this admission.    --- Cont dobutamine 7.5  --- appreciate Heart-Failure recs  --- daily weights: standing weights if possible  --- out of bed to chair, increase activity as tolerated  - Hypotension in setting of sepsis  --- Cont home midodrine 30 TID  --- cont norepinephrine gtt and vasopressin 0.04, titrate to MAP 65-70  - Atrial fibrillation, on home warfarin 3mg QD  --- cont amiodarone 100mg QD  --- check INR QD, dose warfarin QD  ------- INR /5 supratherapeutic to >7 in the setting of no bleeding  ------- reverse INR w Vit K 5mg PO in anticipation of IR perc cholecystostomy   --- cont newly started PPI QD for GI protection while INR supratherapeutic  - Hyperlipidemia --- cont atorvastatin 80 daily  - BP discrepancy between UEs vs LEs  --- BP in lower extremities is significantly higher than in upper extremities, consistent w prior CCU admission; we think that LE BP is likely more accurate than UE BP. F/u VA duplex of UEs did not reveal any significant arterial stenosis.   ---- RUE arterial line placed   - RIJ non-occlusive clot vs fibrin sheath associated w Perma-cath, seen on CT 6/3 --- monitor clinically  - Splenic infarct seen on CT 6/3, potentially to atrial fibrillation and recently sub-therapeutic INR --- supportive care, monitor clinically  - MACE risk reduction --- cont ASA    # Pulm  - Hypoxia, likely multifactorial --- c/w nasal cannula during day, BiLevel at night and PRN  - Pulmonary fibrosis  --- per pulm, do not anticipate benefit from steroids based on unsuccessful trial in past  - ALONZO --- BiLevel at night and PRN, patient minimally compliant with worsening mixed respiratory and metabolic acidosis.  - Pleural effusion, R-sided --- increased from prior CT, now small-to-moderate    # GI  - Probable cholecystitis -- Abdominal pain, R-sided, achy, associated w tenderness to palpation, CT A/P c/f cholecystitis, US RUQ: Sonographic Altamirano's sign negative  --- consider HIDA  --- appreciate surgery recs  --- appreciate IR recs  -------- IR-guided percutaneous cholecystostomy tube placement , pending INR <2  --- order simethicone if needed for gas  --- order acetaminophen if needed for pain  --- avoiding Maalox and other aluminum-hydroxide-containing products due to ESRD   - Nausea and vomiting  --- order ondansetron and/or metoclopramide if needed   --- abd xray with non-obstructive gas pattern, no free air.  - Constipation --- cont senna, bisacodyl, docusate; add'l agents PRN    #  - BPH   --- cont finasteride  --- clarify whether pt needs finasteride given that he is anuric    # Renal - ESRD on HD  --- C/w HD qMWF, as well as Saturday prn and add'l fluid-removal PRN  --- C/w Carrie-Tiffanie, Sevelamer  --- appreciate nephrology recs  --- may need removal of tunneled catheter and chronic PICC in the setting of sepsis.    # Endocr  - Diabetes mellitus type 2  --- off insulin for years  --- monitor glucose on BMP and blood gases  - Hypothyroidism  --- cont levothyroxine    # Infectious Disease   - Sepsis (suspected due to rectal fever, hi WBC, low BP): diff dx includes acute cholecystitis, pneumonia, diverticulitis/gut translocation, and/or CLABSI  --- cont vanc by level  --- cont pip/tazo, renally adjusted  --- f/u BCx (NGTD)  --- Procalcitonin elevated, but un-interpretable in setting of ESRD  --- appreciate ID recs  - HBV vaccination not up to date --- consider HBV vaccination this admission    # VTE ppx: warfarin held in the setting of supra therapeutic INR.   # Dispo: CCU  # GOC: full code, discussed multiple times this admission and in past, including with palliative care

## 2018-06-06 NOTE — PROGRESS NOTE ADULT - SUBJECTIVE AND OBJECTIVE BOX
Patient seen and examined. He still has abdominal pain, worse when he eats. Also has SOB today. No CP, palpitations, dizziness.       Medications:  ALBUTerol/ipratropium for Nebulization 3 milliLiter(s) Nebulizer every 6 hours PRN  amiodarone    Tablet 100 milliGRAM(s) Oral daily  atorvastatin 80 milliGRAM(s) Oral at bedtime  Biotene Dry Mouth Oral Rinse 5 milliLiter(s) Swish and Spit four times a day PRN  bisacodyl 5 milliGRAM(s) Oral at bedtime  chlorhexidine 4% Liquid 1 Application(s) Topical <User Schedule>  dextrose 40% Gel 15 Gram(s) Oral once PRN  dextrose 50% Injectable 12.5 Gram(s) IV Push once  dextrose 50% Injectable 25 Gram(s) IV Push once  dextrose 50% Injectable 25 Gram(s) IV Push once  DOBUTamine Infusion 7.5 MICROgram(s)/kG/Min IV Continuous <Continuous>  docusate sodium 100 milliGRAM(s) Oral two times a day  finasteride 5 milliGRAM(s) Oral daily  insulin lispro (HumaLOG) corrective regimen sliding scale   SubCutaneous three times a day before meals  insulin lispro (HumaLOG) corrective regimen sliding scale   SubCutaneous at bedtime  levothyroxine 75 MICROGram(s) Oral daily  midodrine 30 milliGRAM(s) Oral three times a day  norepinephrine Infusion 1.2 MICROgram(s)/kG/Min IV Continuous <Continuous>  pantoprazole    Tablet 40 milliGRAM(s) Oral before breakfast  piperacillin/tazobactam IVPB. 3.375 Gram(s) IV Intermittent every 12 hours  senna 2 Tablet(s) Oral at bedtime  sevelamer hydrochloride 1600 milliGRAM(s) Oral <User Schedule>  sodium chloride 0.65% Nasal 1 Spray(s) Both Nostrils four times a day  vasopressin Infusion 0.04 Unit(s)/Min IV Continuous <Continuous>      Vitals:  Vital Signs Last 24 Hrs  T(C): 36.7 (06 Jun 2018 12:00), Max: 37.2 (05 Jun 2018 20:00)  T(F): 98.1 (06 Jun 2018 12:00), Max: 98.9 (05 Jun 2018 20:00)  HR: 102 (06 Jun 2018 12:00) (102 - 120)  BP: 105/57 (06 Jun 2018 12:00) (103/75 - 140/102)  BP(mean): 69 (06 Jun 2018 12:00) (69 - 113)  RR: 23 (06 Jun 2018 12:00) (8 - 31)  SpO2: 92% (06 Jun 2018 12:00) (64% - 100%)    Daily     Daily     I&O's Detail    05 Jun 2018 07:01  -  06 Jun 2018 07:00  --------------------------------------------------------  IN:    DOBUTamine Infusion: 192 mL    norepinephrine Infusion: 306 mL    norepinephrine Infusion: 638.4 mL    Oral Fluid: 440 mL    vasopressin Infusion: 28.8 mL  Total IN: 1605.2 mL    OUT:    Other: 2000 mL  Total OUT: 2000 mL    Total NET: -394.8 mL          Physical Exam:     General: No distress. Comfortable.  HEENT: EOM intact.  Neck: Neck supple. JVP moderately elevated. No masses  Chest: Decreased on bases b/l.   CV: Normal S1 and S2. No murmurs, rub, or gallops. Radial pulses normal. Cool b/l. No LE edema b/l.   Abdomen: Soft, non-distended, non-tender  Skin: No rashes or skin breakdown  Neurology: Alert and oriented times three. Sensation intact  Psych: Affect normal    Labs:                        11.9   14.42 )-----------( 156      ( 06 Jun 2018 03:05 )             37.5     06-06    126<L>  |  87<L>  |  36<H>  ----------------------------<  253<H>  4.6   |  18<L>  |  5.44<H>    Ca    8.9      06 Jun 2018 03:05  Phos  3.1     06-06  Mg     2.2     06-06    TPro  8.0  /  Alb  3.1<L>  /  TBili  0.5  /  DBili  x   /  AST  20  /  ALT  16  /  AlkPhos  224<H>  06-06    PT/INR - ( 06 Jun 2018 03:05 )   PT: 91.2 SEC;   INR: 7.61          PTT - ( 06 Jun 2018 03:05 )  PTT:68.1 SEC    < from: TTE with Doppler (w/Cont) (05.28.18 @ 08:43) >  DIMENSIONS:  Dimensions:     Normal Values:  LA:     5.2 cm    2.0 - 4.0 cm  Ao:     3.2 cm    2.0 - 3.8 cm  SEPTUM: 1.0 cm    0.6 - 1.2 cm  PWT:    0.9 cm    0.6 - 1.1 cm  LVIDd:  6.0 cm    3.0 - 5.6 cm  LVIDs:  5.6 cm    1.8 - 4.0 cm  Derived Variables:  LVMI: 122 g/m2  RWT: 0.30  Fractional short: 7 %  Ejection Fraction (Teicholtz): 15 %  ------------------------------------------------------------------------  OBSERVATIONS:  Mitral Valve: Normal mitral valve. Mild mitral  regurgitation.  Aortic Root: Normal aortic root.  Aortic Valve: Normal trileaflet aortic valve. Peak left  ventricular outflow tract gradient equals 3.2 mm Hg, mean  gradient is equal to 1 mm Hg, LVOT velocity time integral  equals 16 cm.  Left Atrium: Mildly dilated left atrium.  LA volume index =  40 cc/m2.  Left Ventricle: Severe global left ventricular systolic  dysfunction.   Endocardial visualization enhanced with  intravenous injection of echo contrast (Definity). No left  ventricular thrombus.   Septal motion consistent with right  ventricular overload. Eccentric left ventricular  hypertrophy (dilated left ventricle with normal relative  wall thickness). (DT:449 ms).  Right Heart: Severe right atrial enlargement.   A device  wire is noted in the right heart. Right ventricular  enlargement with decreased right ventricular systolic  function. Normal tricuspid valve.  Severe tricuspid  regurgitation. Normal pulmonic valve. Mild pulmonic  regurgitation.  Pericardium/PleuraSmall pericardial effusion.  Hemodynamic: Estimated right ventricular systolic pressure  equals 56 mm Hg, assuming right atrial pressure equals 10  mm Hg, consistent with moderate pulmonary hypertension.  ------------------------------------------------------------------------    < end of copied text >

## 2018-06-06 NOTE — PROGRESS NOTE ADULT - SUBJECTIVE AND OBJECTIVE BOX
Note Incomplete  --- Pending Attending Rounds    Briefly: 63m HFrEF (chronic  gtt), Pulm Fibrosis, A-fib (warfarin), ESRD (HD), came for nosebleed, stayed for HD/UF and  titration, course c/b ?sepsis    Interval Events: used BiLevel a couple hrs overnight    Subjective:   GEN no fevers, no chills  HEENT no nosebleed, no mouth bleeding  RESP breathing somewhat better, cough productive of clear sputum, no hemoptysis  CV no chest pain, no dizziness/lightheadedness  GI abd pain similar to yesterday, no nausea, last BM was y'day (small), feels constipated    Physical:  ICU Vital Signs Last 24 Hrs  T(C): 37.2 (2018 20:00), Max: 37.2 (2018 20:00)  T(F): 98.9 (2018 20:00), Max: 98.9 (2018 20:00)  HR: 109 (2018 05:00) (94 - 120)  BP: 114/71 (2018 04:00) (92/54 - 140/102)  BP(mean): 80 (2018 04:00) (55 - 113)  ABP: 106/61 (2018 05:00) (75/40 - 113/66)  ABP(mean): 75 (2018 05:00) (51 - 81)  RR: 25 (2018 05:00) (17 - 31)  SpO2: 93% (2018 05:00) (84% - 100%)      Telemetry:     EKG:    Exam:  GEN sitting in chair, on nasal cannula 4 L/min, ill-/fatigued-appearing  NECK jvd to chin while sitting upright  RESP diffuse crackles similar to recent exams, no wheeze  CV irreg irreg, questionable systolic murmur  ABD R-sided abdominal tenderness, worse RUQ than RLQ, +guarding, +rebound tenderness, +Altamirano's sign  EXT feet cool b/l    LABS:  CAPILLARY BLOOD GLUCOSE      POCT Blood Glucose.: 289 mg/dL (2018 21:56)  POCT Blood Glucose.: 360 mg/dL (2018 18:22)  POCT Blood Glucose.: 370 mg/dL (2018 12:27)  POCT Blood Glucose.: 328 mg/dL (2018 08:54)    I&O's Summary    2018 07:  -  2018 07:00  --------------------------------------------------------  IN: 479.5 mL / OUT: 3900 mL / NET: -3420.5 mL    2018 07:01  -  2018 06:15  --------------------------------------------------------  IN: 1605.2 mL / OUT: 2000 mL / NET: -394.8 mL                              11.9   14.42 )-----------( 156      ( 2018 03:05 )             37.5     WBC Trend: 14.42<--, 15.19<--, 16.02<--  06    126<L>  |  87<L>  |  36<H>  ----------------------------<  253<H>  4.6   |  18<L>  |  5.44<H>    Ca    8.9      2018 03:05  Phos  3.1     -  Mg     2.2     -    TPro  8.0  /  Alb  3.1<L>  /  TBili  0.5  /  DBili  x   /  AST  20  /  ALT  16  /  AlkPhos  224<H>  -    Creatinine Trend: 5.44<--, 4.58<--, 4.06<--, 6.49<--, 5.38<--, 4.20<--  PT/INR - ( 2018 03:05 )   PT: 91.2 SEC;   INR: 7.61          PTT - ( 2018 03:05 )  PTT:68.1 SEC          Imaging:        MEDICATIONS  (STANDING):  amiodarone    Tablet 100 milliGRAM(s) Oral daily  atorvastatin 80 milliGRAM(s) Oral at bedtime  bisacodyl 5 milliGRAM(s) Oral at bedtime  chlorhexidine 4% Liquid 1 Application(s) Topical <User Schedule>  dextrose 50% Injectable 12.5 Gram(s) IV Push once  dextrose 50% Injectable 25 Gram(s) IV Push once  dextrose 50% Injectable 25 Gram(s) IV Push once  DOBUTamine Infusion 7.5 MICROgram(s)/kG/Min (15.975 mL/Hr) IV Continuous <Continuous>  docusate sodium 100 milliGRAM(s) Oral two times a day  finasteride 5 milliGRAM(s) Oral daily  insulin lispro (HumaLOG) corrective regimen sliding scale   SubCutaneous three times a day before meals  insulin lispro (HumaLOG) corrective regimen sliding scale   SubCutaneous at bedtime  levothyroxine 75 MICROGram(s) Oral daily  midodrine 10 milliGRAM(s) Oral three times a day  norepinephrine Infusion 1.2 MICROgram(s)/kG/Min (79.875 mL/Hr) IV Continuous <Continuous>  pantoprazole    Tablet 40 milliGRAM(s) Oral before breakfast  piperacillin/tazobactam IVPB. 3.375 Gram(s) IV Intermittent every 12 hours  senna 2 Tablet(s) Oral at bedtime  sevelamer hydrochloride 1600 milliGRAM(s) Oral <User Schedule>  sodium chloride 0.65% Nasal 1 Spray(s) Both Nostrils four times a day  vasopressin Infusion 0.04 Unit(s)/Min (2.4 mL/Hr) IV Continuous <Continuous>    MEDICATIONS  (PRN):  ALBUTerol/ipratropium for Nebulization 3 milliLiter(s) Nebulizer every 6 hours PRN Shortness of Breath and/or Wheezing  Biotene Dry Mouth Oral Rinse 5 milliLiter(s) Swish and Spit four times a day PRN dry mouth  dextrose 40% Gel 15 Gram(s) Oral once PRN Blood Glucose LESS THAN 70 milliGRAM(s)/deciliter      Consult Recommendations: Note Incomplete  --- Pending Attending Rounds    Briefly: 63m HFrEF (chronic  gtt), Pulm Fibrosis, A-fib (warfarin), ESRD (HD), came for nosebleed, stayed for HD/UF and  titration, course c/b ?sepsis    Interval Events: used BiLevel a couple hrs overnight    Subjective:   GEN no fevers, no chills  HEENT no nosebleed, no mouth bleeding  RESP breathing somewhat better, cough productive of clear sputum, no hemoptysis  CV no chest pain, no dizziness/lightheadedness  GI abd pain similar to yesterday, no nausea, last BM was y'day (small), feels constipated  EXT R radial line bothering him because it is painful to put any pressure on R hand, and R hand feels cold    Physical:  ICU Vital Signs Last 24 Hrs  T(C): 37.2 (2018 20:00), Max: 37.2 (2018 20:00)  T(F): 98.9 (2018 20:00), Max: 98.9 (2018 20:00)  HR: 109 (2018 05:00) (94 - 120)  BP: 114/71 (2018 04:00) (92/54 - 140/102)  BP(mean): 80 (2018 04:00) (55 - 113)  ABP: 106/61 (2018 05:00) (75/40 - 113/66)  ABP(mean): 75 (2018 05:00) (51 - 81)  RR: 25 (2018 05:00) (17 - 31)  SpO2: 93% (2018 05:00) (84% - 100%)      Telemetry:     EKG:    Exam:  GEN sitting in chair, on nasal cannula 4 L/min, ill-/fatigued-appearing  NECK jvd to chin while sitting upright  CHEST R chest-wall Permacath site non-swollen, non-tender  RESP diffuse crackles similar to recent exams, no wheeze  CV irreg irreg, questionable systolic murmur  ABD R-sided abdominal tenderness, worse RUQ than RLQ, +guarding, +rebound tenderness, +Altamirano's sign  UPPER EXT R radial A-line in place, dressing c/d/i, R hand cooler than L. LUE PICC line in place, site non-tender non-swollen  LOWER EXT feet cool b/l    LABS:  CAPILLARY BLOOD GLUCOSE      POCT Blood Glucose.: 289 mg/dL (2018 21:56)  POCT Blood Glucose.: 360 mg/dL (2018 18:22)  POCT Blood Glucose.: 370 mg/dL (2018 12:27)  POCT Blood Glucose.: 328 mg/dL (2018 08:54)    I&O's Summary    2018 07:01  -  2018 07:00  --------------------------------------------------------  IN: 479.5 mL / OUT: 3900 mL / NET: -3420.5 mL    2018 07:01  -  2018 06:15  --------------------------------------------------------  IN: 1605.2 mL / OUT: 2000 mL / NET: -394.8 mL                              11.9   14.42 )-----------( 156      ( 2018 03:05 )             37.5     WBC Trend: 14.42<--, 15.19<--, 16.02<--  06-06    126<L>  |  87<L>  |  36<H>  ----------------------------<  253<H>  4.6   |  18<L>  |  5.44<H>    Ca    8.9      2018 03:05  Phos  3.1     06-06  Mg     2.2     06-06    TPro  8.0  /  Alb  3.1<L>  /  TBili  0.5  /  DBili  x   /  AST  20  /  ALT  16  /  AlkPhos  224<H>  06-06    Creatinine Trend: 5.44<--, 4.58<--, 4.06<--, 6.49<--, 5.38<--, 4.20<--  PT/INR - ( 2018 03:05 )   PT: 91.2 SEC;   INR: 7.61          PTT - ( 2018 03:05 )  PTT:68.1 SEC          Imaging:        MEDICATIONS  (STANDING):  amiodarone    Tablet 100 milliGRAM(s) Oral daily  atorvastatin 80 milliGRAM(s) Oral at bedtime  bisacodyl 5 milliGRAM(s) Oral at bedtime  chlorhexidine 4% Liquid 1 Application(s) Topical <User Schedule>  dextrose 50% Injectable 12.5 Gram(s) IV Push once  dextrose 50% Injectable 25 Gram(s) IV Push once  dextrose 50% Injectable 25 Gram(s) IV Push once  DOBUTamine Infusion 7.5 MICROgram(s)/kG/Min (15.975 mL/Hr) IV Continuous <Continuous>  docusate sodium 100 milliGRAM(s) Oral two times a day  finasteride 5 milliGRAM(s) Oral daily  insulin lispro (HumaLOG) corrective regimen sliding scale   SubCutaneous three times a day before meals  insulin lispro (HumaLOG) corrective regimen sliding scale   SubCutaneous at bedtime  levothyroxine 75 MICROGram(s) Oral daily  midodrine 10 milliGRAM(s) Oral three times a day  norepinephrine Infusion 1.2 MICROgram(s)/kG/Min (79.875 mL/Hr) IV Continuous <Continuous>  pantoprazole    Tablet 40 milliGRAM(s) Oral before breakfast  piperacillin/tazobactam IVPB. 3.375 Gram(s) IV Intermittent every 12 hours  senna 2 Tablet(s) Oral at bedtime  sevelamer hydrochloride 1600 milliGRAM(s) Oral <User Schedule>  sodium chloride 0.65% Nasal 1 Spray(s) Both Nostrils four times a day  vasopressin Infusion 0.04 Unit(s)/Min (2.4 mL/Hr) IV Continuous <Continuous>    MEDICATIONS  (PRN):  ALBUTerol/ipratropium for Nebulization 3 milliLiter(s) Nebulizer every 6 hours PRN Shortness of Breath and/or Wheezing  Biotene Dry Mouth Oral Rinse 5 milliLiter(s) Swish and Spit four times a day PRN dry mouth  dextrose 40% Gel 15 Gram(s) Oral once PRN Blood Glucose LESS THAN 70 milliGRAM(s)/deciliter      Consult Recommendations: Note Incomplete  --- Pending Attending Rounds    Briefly: 63m HFrEF (chronic  gtt), Pulm Fibrosis, A-fib (warfarin), ESRD (HD), came for nosebleed, stayed for HD/UF and  titration, course c/b ?sepsis    Interval Events: used BiLevel a couple hrs overnight    Subjective:   GEN no fevers, no chills  HEENT no nosebleed, no mouth bleeding  RESP breathing somewhat better, cough productive of clear sputum, no hemoptysis  CV no chest pain, no dizziness/lightheadedness  GI abd pain similar to yesterday, no nausea, last BM was y'day (small), feels constipated  EXT R radial line bothering him because it is painful to put any pressure on R hand, and R hand feels cold    Physical:  ICU Vital Signs Last 24 Hrs  T(C): 37.2 (2018 20:00), Max: 37.2 (2018 20:00)  T(F): 98.9 (2018 20:00), Max: 98.9 (2018 20:00)  HR: 109 (2018 05:00) (94 - 120)  BP: 114/71 (2018 04:00) (92/54 - 140/102)  BP(mean): 80 (2018 04:00) (55 - 113)  ABP: 106/61 (2018 05:00) (75/40 - 113/66)  ABP(mean): 75 (2018 05:00) (51 - 81)  RR: 25 (2018 05:00) (17 - 31)  SpO2: 93% (2018 05:00) (84% - 100%)      Telemetry: A-fib, HR mostly 105-115. Frequent PVCs, up to 4 consecutive beats.     EKG:    Exam:  GEN sitting in chair, on nasal cannula 4 L/min, ill-/fatigued-appearing  NECK jvd to chin while sitting upright  CHEST R chest-wall Permacath site non-swollen, non-tender  RESP diffuse crackles similar to recent exams, no wheeze  CV irreg irreg, questionable systolic murmur  ABD R-sided abdominal tenderness, worse RUQ than RLQ, +guarding, +rebound tenderness, +Altamirano's sign  UPPER EXT R radial A-line in place, dressing c/d/i, R hand cooler than DENTON HEATH PICC line in place, site non-tender non-swollen  LOWER EXT feet cool b/l    LABS:  CAPILLARY BLOOD GLUCOSE      POCT Blood Glucose.: 289 mg/dL (2018 21:56)  POCT Blood Glucose.: 360 mg/dL (2018 18:22)  POCT Blood Glucose.: 370 mg/dL (2018 12:27)  POCT Blood Glucose.: 328 mg/dL (2018 08:54)    I&O's Summary    2018 07:01  -  2018 07:00  --------------------------------------------------------  IN: 479.5 mL / OUT: 3900 mL / NET: -3420.5 mL    2018 07:01  -  2018 06:15  --------------------------------------------------------  IN: 1605.2 mL / OUT: 2000 mL / NET: -394.8 mL                              11.9   14.42 )-----------( 156      ( 2018 03:05 )             37.5     WBC Trend: 14.42<--, 15.19<--, 16.02<--  06-06    126<L>  |  87<L>  |  36<H>  ----------------------------<  253<H>  4.6   |  18<L>  |  5.44<H>    Ca    8.9      2018 03:05  Phos  3.1     06-06  Mg     2.2     06-    TPro  8.0  /  Alb  3.1<L>  /  TBili  0.5  /  DBili  x   /  AST  20  /  ALT  16  /  AlkPhos  224<H>  -    Creatinine Trend: 5.44<--, 4.58<--, 4.06<--, 6.49<--, 5.38<--, 4.20<--  PT/INR - ( 2018 03:05 )   PT: 91.2 SEC;   INR: 7.61          PTT - ( 2018 03:05 )  PTT:68.1 SEC          Imaging:        MEDICATIONS  (STANDING):  amiodarone    Tablet 100 milliGRAM(s) Oral daily  atorvastatin 80 milliGRAM(s) Oral at bedtime  bisacodyl 5 milliGRAM(s) Oral at bedtime  chlorhexidine 4% Liquid 1 Application(s) Topical <User Schedule>  dextrose 50% Injectable 12.5 Gram(s) IV Push once  dextrose 50% Injectable 25 Gram(s) IV Push once  dextrose 50% Injectable 25 Gram(s) IV Push once  DOBUTamine Infusion 7.5 MICROgram(s)/kG/Min (15.975 mL/Hr) IV Continuous <Continuous>  docusate sodium 100 milliGRAM(s) Oral two times a day  finasteride 5 milliGRAM(s) Oral daily  insulin lispro (HumaLOG) corrective regimen sliding scale   SubCutaneous three times a day before meals  insulin lispro (HumaLOG) corrective regimen sliding scale   SubCutaneous at bedtime  levothyroxine 75 MICROGram(s) Oral daily  midodrine 10 milliGRAM(s) Oral three times a day  norepinephrine Infusion 1.2 MICROgram(s)/kG/Min (79.875 mL/Hr) IV Continuous <Continuous>  pantoprazole    Tablet 40 milliGRAM(s) Oral before breakfast  piperacillin/tazobactam IVPB. 3.375 Gram(s) IV Intermittent every 12 hours  senna 2 Tablet(s) Oral at bedtime  sevelamer hydrochloride 1600 milliGRAM(s) Oral <User Schedule>  sodium chloride 0.65% Nasal 1 Spray(s) Both Nostrils four times a day  vasopressin Infusion 0.04 Unit(s)/Min (2.4 mL/Hr) IV Continuous <Continuous>    MEDICATIONS  (PRN):  ALBUTerol/ipratropium for Nebulization 3 milliLiter(s) Nebulizer every 6 hours PRN Shortness of Breath and/or Wheezing  Biotene Dry Mouth Oral Rinse 5 milliLiter(s) Swish and Spit four times a day PRN dry mouth  dextrose 40% Gel 15 Gram(s) Oral once PRN Blood Glucose LESS THAN 70 milliGRAM(s)/deciliter      Consult Recommendations: Briefly: 63m HFrEF (chronic  gtt), Pulm Fibrosis, A-fib (warfarin), ESRD (HD), came for nosebleed, stayed for HD/UF and  titration, course c/b ?sepsis    Interval Events: used BiLevel a couple hrs overnight    Subjective:   GEN no fevers, no chills  HEENT no nosebleed, no mouth bleeding  RESP breathing somewhat better, cough productive of clear sputum, no hemoptysis  CV no chest pain, no dizziness/lightheadedness  GI abd pain similar to yesterday, no nausea, last BM was y'day (small), feels constipated  EXT R radial line bothering him because it is painful to put any pressure on R hand, and R hand feels cold    Physical:  ICU Vital Signs Last 24 Hrs  T(C): 37.2 (2018 20:00), Max: 37.2 (2018 20:00)  T(F): 98.9 (2018 20:00), Max: 98.9 (2018 20:00)  HR: 109 (2018 05:00) (94 - 120)  BP: 114/71 (2018 04:00) (92/54 - 140/102)  BP(mean): 80 (2018 04:00) (55 - 113)  ABP: 106/61 (2018 05:00) (75/40 - 113/66)  ABP(mean): 75 (2018 05:00) (51 - 81)  RR: 25 (2018 05:00) (17 - 31)  SpO2: 93% (2018 05:00) (84% - 100%)      Telemetry: A-fib, HR mostly 105-115. Frequent PVCs, up to 4 consecutive beats.     EKG:    Exam:  GEN sitting in chair, on nasal cannula 4 L/min, ill-/fatigued-appearing  NECK jvd to chin while sitting upright  CHEST R chest-wall Permacath site non-swollen, non-tender  RESP diffuse crackles similar to recent exams, no wheeze  CV irreg irreg, questionable systolic murmur  ABD R-sided abdominal tenderness, worse RUQ than RLQ, +guarding, +rebound tenderness, +Altamirano's sign  UPPER EXT R radial A-line in place, dressing c/d/i, R hand cooler than L. LUE PICC line in place, site non-tender non-swollen  LOWER EXT feet cool b/l    LABS:  CAPILLARY BLOOD GLUCOSE      POCT Blood Glucose.: 289 mg/dL (2018 21:56)  POCT Blood Glucose.: 360 mg/dL (2018 18:22)  POCT Blood Glucose.: 370 mg/dL (2018 12:27)  POCT Blood Glucose.: 328 mg/dL (2018 08:54)    I&O's Summary    2018 07:01  -  2018 07:00  --------------------------------------------------------  IN: 479.5 mL / OUT: 3900 mL / NET: -3420.5 mL    2018 07:01  -  2018 06:15  --------------------------------------------------------  IN: 1605.2 mL / OUT: 2000 mL / NET: -394.8 mL                              11.9   14.42 )-----------( 156      ( 2018 03:05 )             37.5     WBC Trend: 14.42<--, 15.19<--, 16.02<--  06-06    126<L>  |  87<L>  |  36<H>  ----------------------------<  253<H>  4.6   |  18<L>  |  5.44<H>    Ca    8.9      2018 03:05  Phos  3.1     06-06  Mg     2.2     06-    TPro  8.0  /  Alb  3.1<L>  /  TBili  0.5  /  DBili  x   /  AST  20  /  ALT  16  /  AlkPhos  224<H>  06-06    Creatinine Trend: 5.44<--, 4.58<--, 4.06<--, 6.49<--, 5.38<--, 4.20<--  PT/INR - ( 2018 03:05 )   PT: 91.2 SEC;   INR: 7.61          PTT - ( 2018 03:05 )  PTT:68.1 SEC          Imaging:        MEDICATIONS  (STANDING):  amiodarone    Tablet 100 milliGRAM(s) Oral daily  atorvastatin 80 milliGRAM(s) Oral at bedtime  bisacodyl 5 milliGRAM(s) Oral at bedtime  chlorhexidine 4% Liquid 1 Application(s) Topical <User Schedule>  dextrose 50% Injectable 12.5 Gram(s) IV Push once  dextrose 50% Injectable 25 Gram(s) IV Push once  dextrose 50% Injectable 25 Gram(s) IV Push once  DOBUTamine Infusion 7.5 MICROgram(s)/kG/Min (15.975 mL/Hr) IV Continuous <Continuous>  docusate sodium 100 milliGRAM(s) Oral two times a day  finasteride 5 milliGRAM(s) Oral daily  insulin lispro (HumaLOG) corrective regimen sliding scale   SubCutaneous three times a day before meals  insulin lispro (HumaLOG) corrective regimen sliding scale   SubCutaneous at bedtime  levothyroxine 75 MICROGram(s) Oral daily  midodrine 10 milliGRAM(s) Oral three times a day  norepinephrine Infusion 1.2 MICROgram(s)/kG/Min (79.875 mL/Hr) IV Continuous <Continuous>  pantoprazole    Tablet 40 milliGRAM(s) Oral before breakfast  piperacillin/tazobactam IVPB. 3.375 Gram(s) IV Intermittent every 12 hours  senna 2 Tablet(s) Oral at bedtime  sevelamer hydrochloride 1600 milliGRAM(s) Oral <User Schedule>  sodium chloride 0.65% Nasal 1 Spray(s) Both Nostrils four times a day  vasopressin Infusion 0.04 Unit(s)/Min (2.4 mL/Hr) IV Continuous <Continuous>    MEDICATIONS  (PRN):  ALBUTerol/ipratropium for Nebulization 3 milliLiter(s) Nebulizer every 6 hours PRN Shortness of Breath and/or Wheezing  Biotene Dry Mouth Oral Rinse 5 milliLiter(s) Swish and Spit four times a day PRN dry mouth  dextrose 40% Gel 15 Gram(s) Oral once PRN Blood Glucose LESS THAN 70 milliGRAM(s)/deciliter      Consult Recommendations:

## 2018-06-06 NOTE — PROGRESS NOTE ADULT - PROBLEM SELECTOR PLAN 1
-Currently on dobutamine 7.5 mcg/kg/min, norepinephrine and vasopresin.  Target MAP 65. Midodrine was started at 10, 20, and now 30 mg po TID.  -No BB while on . Unable to tolerate ACEI/ARB/ARNI due to low BP.   -He has not been an advanced HF therapy (LVAD/transplant) candidate in past due to severe interstitial lung disease. After numerous attempts to optimize HF, pt still has symptoms of SOB- likely combination of both ADHF and severe interstitial disease. Palliative approach is most appropriate for this patient.   -Continue fluid removal via HD. -Currently on dobutamine 7.5 mcg/kg/min, norepinephrine and vasopresin.  Target MAP 65. CCU team to wean off pressors as BP allows. Midodrine was started at 10, 20, and now 30 mg po TID.  -No BB while on . Unable to tolerate ACEI/ARB/ARNI due to low BP.   -He has not been an advanced HF therapy (LVAD/transplant) candidate in past due to severe interstitial lung disease. After numerous attempts to optimize HF, pt still has symptoms of SOB- likely combination of both ADHF and severe interstitial disease. Palliative approach is most appropriate for this patient.   -Continue fluid removal via HD. -Currently on dobutamine 7.5 mcg/kg/min, norepinephrine and vasopresin.  Target MAP 65. CCU team to wean off pressors as BP allows. Midodrine was started 30 mg po TID.  -No BB while on . Unable to tolerate ACEI/ARB/ARNI due to low BP.   -He has not been an advanced HF therapy (LVAD/transplant) candidate in past due to severe interstitial lung disease. After numerous attempts to optimize HF, pt still has symptoms of SOB- likely combination of both ADHF and severe interstitial disease. Palliative approach is most appropriate for this patient.   -Continue fluid removal via HD.

## 2018-06-06 NOTE — PROGRESS NOTE ADULT - SUBJECTIVE AND OBJECTIVE BOX
Follow Up:      [place] History/ROS: Patient is a 63y old  Male who presents with a chief complaint of SOB (29 May 2018 15:55)    Requiring pressors/ inotrophs.  Complains of right sided abdominal pain.  No fever.       Allergies    No Known Allergies    Intolerances        ANTIMICROBIALS:  piperacillin/tazobactam IVPB. 3.375 every 12 hours      OTHER MEDS:  ALBUTerol/ipratropium for Nebulization 3 milliLiter(s) Nebulizer every 6 hours PRN  amiodarone    Tablet 100 milliGRAM(s) Oral daily  atorvastatin 80 milliGRAM(s) Oral at bedtime  Biotene Dry Mouth Oral Rinse 5 milliLiter(s) Swish and Spit four times a day PRN  bisacodyl 5 milliGRAM(s) Oral at bedtime  chlorhexidine 4% Liquid 1 Application(s) Topical <User Schedule>  dextrose 40% Gel 15 Gram(s) Oral once PRN  dextrose 50% Injectable 12.5 Gram(s) IV Push once  dextrose 50% Injectable 25 Gram(s) IV Push once  dextrose 50% Injectable 25 Gram(s) IV Push once  DOBUTamine Infusion 7.5 MICROgram(s)/kG/Min IV Continuous <Continuous>  docusate sodium 100 milliGRAM(s) Oral two times a day  finasteride 5 milliGRAM(s) Oral daily  insulin lispro (HumaLOG) corrective regimen sliding scale   SubCutaneous three times a day before meals  insulin lispro (HumaLOG) corrective regimen sliding scale   SubCutaneous at bedtime  levothyroxine 75 MICROGram(s) Oral daily  midodrine 30 milliGRAM(s) Oral three times a day  norepinephrine Infusion 1.2 MICROgram(s)/kG/Min IV Continuous <Continuous>  pantoprazole    Tablet 40 milliGRAM(s) Oral before breakfast  senna 2 Tablet(s) Oral at bedtime  sevelamer hydrochloride 1600 milliGRAM(s) Oral <User Schedule>  sodium chloride 0.65% Nasal 1 Spray(s) Both Nostrils four times a day  vasopressin Infusion 0.04 Unit(s)/Min IV Continuous <Continuous>      Vital Signs Last 24 Hrs  T(C): 36.8 (06 Jun 2018 08:05), Max: 37.2 (05 Jun 2018 20:00)  T(F): 98.2 (06 Jun 2018 08:05), Max: 98.9 (05 Jun 2018 20:00)  HR: 105 (06 Jun 2018 10:15) (94 - 120)  BP: 110/73 (06 Jun 2018 10:00) (92/54 - 140/102)  BP(mean): 82 (06 Jun 2018 10:00) (59 - 113)  RR: 21 (06 Jun 2018 10:15) (17 - 31)  SpO2: 90% (06 Jun 2018 10:15) (64% - 100%)    PHYSICAL EXAM:  General: [x ] uncomfortable  HEAD/EYES: [ ] PERRL [ x] white sclera [ ] icterus  ENT:  [ ] normal [x ] supple [ ] thrush [ ] pharyngeal exudate  Cardiovascular:   [ ] murmur [x ] normal [ ] PPM/AICD  Respiratory:  [x ] clear to ausculation bilaterally  GI:  [x ] soft, right sided -tender, normal bowel sounds  :  [ ] rosa [ x] no CVA tenderness   Musculoskeletal:  [ ] no synovitis  Neurologic:  [ x] non-focal exam   Skin:  [x ] no rash  Lymph: [ x] no lymphadenopathy  Psychiatric:  [x ] appropriate affect [ ] alert & oriented  Lines:  [x ] no phlebitis [ ] central line                                11.9   14.42 )-----------( 156      ( 06 Jun 2018 03:05 )             37.5       06-06    126<L>  |  87<L>  |  36<H>  ----------------------------<  253<H>  4.6   |  18<L>  |  5.44<H>    Ca    8.9      06 Jun 2018 03:05  Phos  3.1     06-06  Mg     2.2     06-06    TPro  8.0  /  Alb  3.1<L>  /  TBili  0.5  /  DBili  x   /  AST  20  /  ALT  16  /  AlkPhos  224<H>  06-06          MICROBIOLOGY:    RADIOLOGY:  < from: CT Abdomen and Pelvis w/ IV Cont (06.03.18 @ 21:40) >  IMPRESSION:   Small right pleural effusion which has increased in size since 3/28/2018.  Interstitial lung disease with ground glass opacities which have   increased slightly in density since 3/22/2018.  Distended gallbladder with wall thickening and sludge or small stones,   with nonspecific pericholecystic fluid. These findings can be seen in the   setting of acute cholecystitis. Ultrasound or HIDA scan is recommended   for further evaluation.  Small splenic infarct.  Rounded low density region within the right internal jugular vein above a   central venous catheter which could represent a nonocclusive clot or   fibrin sheath.    < end of copied text >

## 2018-06-06 NOTE — PROGRESS NOTE ADULT - PROBLEM SELECTOR PLAN 2
HD as tolerated  concern for ability to tolerate HD with presumed sepsis  pt refusing percutaneous drainage of GB but now with loss of capacity consider asking wife- HCP (see last admission alpha tab)

## 2018-06-06 NOTE — PROGRESS NOTE ADULT - PROBLEM SELECTOR PLAN 2
Plan as above. Continue dobutamine 7.5 mcg/kg/min.  Poor prognosis.   Appreciate palliative care team. Await family meeting.

## 2018-06-06 NOTE — PROGRESS NOTE ADULT - ASSESSMENT
·	End Stage Renal Disease on Dialysis + congestive heart failure with hypotension on dobutamine gtt with signs of fluid overload,  ·	anemia hb stable  ·	congestive heart failure on dobutamine drip, follow up with card      labs reviewed

## 2018-06-06 NOTE — PROGRESS NOTE ADULT - PROBLEM SELECTOR PLAN 1
had PUF yesterday  still volume overloaded  scheduled for HD today, 4hrs, w/2k bath, uf 3.5L as tolearted  low Na 2/2 hypervolemia.   c/w renal diet , fluid restriction 1L/day

## 2018-06-06 NOTE — CHART NOTE - NSCHARTNOTEFT_GEN_A_CORE
63yM w severe HFrEF (on dobutamine gtt x3 yrs) w [employer] AICD, A-fib (on warfarin), ESRD on HD MWF (+Sat PRN), pulmonary fibrosis (on home O2), on  p/w epistaxis x1 day, also w acute-on-chronic SOB, found to be volume-overloaded despite reported med compliance, no dietary changes, and having gotten extra session of HD the day prior to admission. SBP 80s-90s in UEs, so in order to be monitored while undergoing fluid removal by HD/UF, was transferred to CCU. Since admission, dyspnea improved.  gtt has been adjusted from 7.5 as low as 2.5, based on RHC measurements ([place] in place -). Course c/b septic shock on multiple pressors 2/2 possible cholecystitis.    - HIDA scan; if positive will need source control  - cont , levophed, and vaso  - cont braod spec abx

## 2018-06-06 NOTE — PROGRESS NOTE ADULT - SUBJECTIVE AND OBJECTIVE BOX
INTERVAL HPI/OVERNIGHT EVENTS:  Pt lethargic on NRB.  Now on pressors with acute choley    Allergies    No Known Allergies    Intolerances        Code Status:          PRESENT SYMPTOMS:   SOURCE:  [x ] Patient   [ ] Family   [ ] Team     Pain: yes  Onset: days     Location: RUQ          Duration:  days      Character: sharp        Aggravating factors: movement         Relieving Factors: pain med            Timing:         Severity:  unable to assess    Dyspnea [x ] YES [ ] NO - Mild [ ]  Moderate [ ]  Severe [x ]   Anxiety:  [ ] YES [x ] NO  Fatigue: [ x] YES [ ] NO  Nausea: [ ] YES [x ] NO  Loss of Appetite: [ x] YES [ ] NO  Constipation [ ] YES   [x ] No     OTHER SYMPTOMS:  [ ] All other ROS negative     [ x Unable to obtain due to poor mentation    Does the patient meet criteria for Severe Protein Calorie Malnutrition?  Yes [ ]  No [ ]   PPSV less than <30% [ ]  Anasarca [ ]  Albumin <2 [ ]  Catabolic State [ ]  Poor nutritional intake [ ]  Significant weight loss [ ]     MEDICATIONS  (STANDING):  amiodarone    Tablet 100 milliGRAM(s) Oral daily  atorvastatin 80 milliGRAM(s) Oral at bedtime  bisacodyl 5 milliGRAM(s) Oral at bedtime  chlorhexidine 4% Liquid 1 Application(s) Topical <User Schedule>  dextrose 50% Injectable 12.5 Gram(s) IV Push once  dextrose 50% Injectable 25 Gram(s) IV Push once  dextrose 50% Injectable 25 Gram(s) IV Push once  DOBUTamine Infusion 7.5 MICROgram(s)/kG/Min (15.975 mL/Hr) IV Continuous <Continuous>  docusate sodium 100 milliGRAM(s) Oral two times a day  finasteride 5 milliGRAM(s) Oral daily  insulin lispro (HumaLOG) corrective regimen sliding scale   SubCutaneous three times a day before meals  insulin lispro (HumaLOG) corrective regimen sliding scale   SubCutaneous at bedtime  levothyroxine 75 MICROGram(s) Oral daily  midodrine 30 milliGRAM(s) Oral three times a day  norepinephrine Infusion 1.2 MICROgram(s)/kG/Min (79.875 mL/Hr) IV Continuous <Continuous>  pantoprazole    Tablet 40 milliGRAM(s) Oral before breakfast  piperacillin/tazobactam IVPB. 3.375 Gram(s) IV Intermittent every 12 hours  senna 2 Tablet(s) Oral at bedtime  sevelamer hydrochloride 1600 milliGRAM(s) Oral <User Schedule>  sodium chloride 0.65% Nasal 1 Spray(s) Both Nostrils four times a day  vasopressin Infusion 0.04 Unit(s)/Min (2.4 mL/Hr) IV Continuous <Continuous>    MEDICATIONS  (PRN):  ALBUTerol/ipratropium for Nebulization 3 milliLiter(s) Nebulizer every 6 hours PRN Shortness of Breath and/or Wheezing  Biotene Dry Mouth Oral Rinse 5 milliLiter(s) Swish and Spit four times a day PRN dry mouth  dextrose 40% Gel 15 Gram(s) Oral once PRN Blood Glucose LESS THAN 70 milliGRAM(s)/deciliter      Palliative Performance Status Version 2:    40     %  ECOG -        Physical Exam:    General: [x ] Alert,  A&O x   1-2  [x ] lethargic   [ ] Agitated   [ ] Cachexia   HEENT: [x ] Normal   [ ] Dry mouth   [ ] ET Tube    [ ] Trach   Lungs: [ ] Clear [ ] Rhonchi  [ ] Crackles [ ] Wheezing [x ] Tachypnea  [ ] Audible excessive secretions   Cardiovascular:  [ ] Regular rate and rhythm  [ ] Irregular [x ] Tachycardia   [ ] Bradycardia   Abdomen: [x ] Soft  [ ] Distended  [ ]  [x ] +BS  [ ] Non tender [ x] Tender  [ ]PEG   [ ] NGT   Last BM:     Genitourinary:  [ ] Normal [ ] Incontinent   [ x] Oliguria/Anuria   [ ] Bellamy  Musculoskeletal:  [ ] Normal   [ ] Generalized weakness  [x ] Bedbound  [ ] Edema   Neurological: [ ] No focal deficits  [ ] Cognitive impairment   +encephalopathy  Skin: [x ] Normal   [ ] Pressure ulcers     Vital Signs Last 24 Hrs  T(C): 36.7 (06 Jun 2018 12:00), Max: 37.2 (05 Jun 2018 20:00)  T(F): 98.1 (06 Jun 2018 12:00), Max: 98.9 (05 Jun 2018 20:00)  HR: 101 (06 Jun 2018 13:00) (101 - 119)  BP: 105/57 (06 Jun 2018 12:00) (103/75 - 140/102)  BP(mean): 69 (06 Jun 2018 12:00) (69 - 113)  RR: 21 (06 Jun 2018 13:00) (8 - 31)  SpO2: 90% (06 Jun 2018 13:00) (64% - 100%)    LABS:                        11.9   14.42 )-----------( 156      ( 06 Jun 2018 03:05 )             37.5     06-06    126<L>  |  87<L>  |  36<H>  ----------------------------<  253<H>  4.6   |  18<L>  |  5.44<H>    Ca    8.9      06 Jun 2018 03:05  Phos  3.1     06-06  Mg     2.2     06-06    TPro  8.0  /  Alb  3.1<L>  /  TBili  0.5  /  DBili  x   /  AST  20  /  ALT  16  /  AlkPhos  224<H>  06-06    PT/INR - ( 06 Jun 2018 03:05 )   PT: 91.2 SEC;   INR: 7.61          PTT - ( 06 Jun 2018 03:05 )  PTT:68.1 SEC    I&O's Summary    05 Jun 2018 07:01  -  06 Jun 2018 07:00  --------------------------------------------------------  IN: 1605.2 mL / OUT: 2000 mL / NET: -394.8 mL        RADIOLOGY & ADDITIONAL STUDIES:

## 2018-06-06 NOTE — PROGRESS NOTE ADULT - ASSESSMENT
63 year old man with ESRD (HD MWF), NICM (EF 21%) s/p ICD, PICC line in place with home dobutamine drip, atrial fibrillation on coumadin, COPD on home O2 (4-5L), pulmonary fibrosis with increasing shortness of breath and severe epistaxis.  No more epistaxis.  Continue dobutamine. Would strongly advise against down titration attempts as in the past this was not well tolerated.  Consider restoration of outpatient dose without titration.   Echo results unchanged from previous study.  EF is 15%. NYHA Class IV ACC AHA Stage D  On Midodrine  Unable to attempt GDMT (ACE/ARB/ARNI/BB) given hypotension. Now requiring Levophed - possible vasodilation secondary to sepsis  Severe ILD precludes ability to implant LVAD  HD per renal  BIPAP per Pulmonary  Appears to have cholecystitis - ID and Surgery f/u appreciated HIDA scan pending  Appreciate CCU care and multiple consultants follow up.  Continue present care

## 2018-06-07 LAB
ALBUMIN SERPL ELPH-MCNC: 3.1 G/DL — LOW (ref 3.3–5)
ALP SERPL-CCNC: 487 U/L — HIGH (ref 40–120)
ALT FLD-CCNC: 31 U/L — SIGNIFICANT CHANGE UP (ref 4–41)
APTT BLD: 72.8 SEC — HIGH (ref 27.5–37.4)
AST SERPL-CCNC: 60 U/L — HIGH (ref 4–40)
BACTERIA BLD CULT: SIGNIFICANT CHANGE UP
BACTERIA BLD CULT: SIGNIFICANT CHANGE UP
BASE EXCESS BLDA CALC-SCNC: -2.7 MMOL/L — SIGNIFICANT CHANGE UP
BILIRUB SERPL-MCNC: 2.6 MG/DL — HIGH (ref 0.2–1.2)
BUN SERPL-MCNC: 23 MG/DL — SIGNIFICANT CHANGE UP (ref 7–23)
CALCIUM SERPL-MCNC: 8.8 MG/DL — SIGNIFICANT CHANGE UP (ref 8.4–10.5)
CHLORIDE BLDA-SCNC: 102 MMOL/L — SIGNIFICANT CHANGE UP (ref 96–108)
CHLORIDE SERPL-SCNC: 91 MMOL/L — LOW (ref 98–107)
CO2 SERPL-SCNC: 19 MMOL/L — LOW (ref 22–31)
CREAT SERPL-MCNC: 3.9 MG/DL — HIGH (ref 0.5–1.3)
GLUCOSE BLDA-MCNC: 126 MG/DL — HIGH (ref 70–99)
GLUCOSE SERPL-MCNC: 118 MG/DL — HIGH (ref 70–99)
HCO3 BLDA-SCNC: 22 MMOL/L — SIGNIFICANT CHANGE UP (ref 22–26)
HCT VFR BLD CALC: 36.6 % — LOW (ref 39–50)
HCT VFR BLDA CALC: 38.5 % — LOW (ref 39–51)
HGB BLD-MCNC: 12.3 G/DL — LOW (ref 13–17)
HGB BLDA-MCNC: 12.5 G/DL — LOW (ref 13–17)
INR BLD: 8.41 — CRITICAL HIGH (ref 0.88–1.17)
LACTATE BLDA-SCNC: 1.6 MMOL/L — SIGNIFICANT CHANGE UP (ref 0.5–2)
MAGNESIUM SERPL-MCNC: 1.9 MG/DL — SIGNIFICANT CHANGE UP (ref 1.6–2.6)
MCHC RBC-ENTMCNC: 31.1 PG — SIGNIFICANT CHANGE UP (ref 27–34)
MCHC RBC-ENTMCNC: 33.6 % — SIGNIFICANT CHANGE UP (ref 32–36)
MCV RBC AUTO: 92.7 FL — SIGNIFICANT CHANGE UP (ref 80–100)
NRBC # FLD: 0.02 — SIGNIFICANT CHANGE UP
PCO2 BLDA: 42 MMHG — SIGNIFICANT CHANGE UP (ref 35–48)
PH BLDA: 7.35 PH — SIGNIFICANT CHANGE UP (ref 7.35–7.45)
PHOSPHATE SERPL-MCNC: 2.7 MG/DL — SIGNIFICANT CHANGE UP (ref 2.5–4.5)
PLATELET # BLD AUTO: 137 K/UL — LOW (ref 150–400)
PMV BLD: 11.3 FL — SIGNIFICANT CHANGE UP (ref 7–13)
PO2 BLDA: 83 MMHG — SIGNIFICANT CHANGE UP (ref 83–108)
POTASSIUM BLDA-SCNC: 3.7 MMOL/L — SIGNIFICANT CHANGE UP (ref 3.4–4.5)
POTASSIUM SERPL-MCNC: 3.8 MMOL/L — SIGNIFICANT CHANGE UP (ref 3.5–5.3)
POTASSIUM SERPL-SCNC: 3.8 MMOL/L — SIGNIFICANT CHANGE UP (ref 3.5–5.3)
PROT SERPL-MCNC: 8.4 G/DL — HIGH (ref 6–8.3)
PROTHROM AB SERPL-ACNC: 97.5 SEC — HIGH (ref 9.8–13.1)
RBC # BLD: 3.95 M/UL — LOW (ref 4.2–5.8)
RBC # FLD: 15 % — HIGH (ref 10.3–14.5)
SAO2 % BLDA: 94.8 % — LOW (ref 95–99)
SODIUM BLDA-SCNC: 130 MMOL/L — LOW (ref 136–146)
SODIUM SERPL-SCNC: 131 MMOL/L — LOW (ref 135–145)
VANCOMYCIN FLD-MCNC: 13.2 UG/ML — SIGNIFICANT CHANGE UP
WBC # BLD: 16.13 K/UL — HIGH (ref 3.8–10.5)
WBC # FLD AUTO: 16.13 K/UL — HIGH (ref 3.8–10.5)

## 2018-06-07 PROCEDURE — 99233 SBSQ HOSP IP/OBS HIGH 50: CPT

## 2018-06-07 PROCEDURE — 99232 SBSQ HOSP IP/OBS MODERATE 35: CPT

## 2018-06-07 RX ORDER — GENTAMICIN SULFATE 40 MG/ML
150 VIAL (ML) INJECTION ONCE
Qty: 0 | Refills: 0 | Status: COMPLETED | OUTPATIENT
Start: 2018-06-07 | End: 2018-06-07

## 2018-06-07 RX ORDER — VASOPRESSIN 20 [USP'U]/ML
0.04 INJECTION INTRAVENOUS
Qty: 100 | Refills: 0 | Status: DISCONTINUED | OUTPATIENT
Start: 2018-06-07 | End: 2018-06-09

## 2018-06-07 RX ORDER — VANCOMYCIN HCL 1 G
1250 VIAL (EA) INTRAVENOUS ONCE
Qty: 0 | Refills: 0 | Status: COMPLETED | OUTPATIENT
Start: 2018-06-07 | End: 2018-06-07

## 2018-06-07 RX ADMIN — Medication 79.88 MICROGRAM(S)/KG/MIN: at 17:32

## 2018-06-07 RX ADMIN — Medication 100 MILLIGRAM(S): at 18:26

## 2018-06-07 RX ADMIN — Medication 75 MICROGRAM(S): at 06:26

## 2018-06-07 RX ADMIN — Medication 1: at 18:26

## 2018-06-07 RX ADMIN — AMIODARONE HYDROCHLORIDE 100 MILLIGRAM(S): 400 TABLET ORAL at 06:26

## 2018-06-07 RX ADMIN — MIDODRINE HYDROCHLORIDE 30 MILLIGRAM(S): 2.5 TABLET ORAL at 14:11

## 2018-06-07 RX ADMIN — Medication 200 MILLIGRAM(S): at 17:57

## 2018-06-07 RX ADMIN — PIPERACILLIN AND TAZOBACTAM 25 GRAM(S): 4; .5 INJECTION, POWDER, LYOPHILIZED, FOR SOLUTION INTRAVENOUS at 18:26

## 2018-06-07 RX ADMIN — ATORVASTATIN CALCIUM 80 MILLIGRAM(S): 80 TABLET, FILM COATED ORAL at 21:20

## 2018-06-07 RX ADMIN — Medication 166.67 MILLIGRAM(S): at 05:46

## 2018-06-07 RX ADMIN — Medication 79.88 MICROGRAM(S)/KG/MIN: at 21:42

## 2018-06-07 RX ADMIN — Medication 1 SPRAY(S): at 06:28

## 2018-06-07 RX ADMIN — PIPERACILLIN AND TAZOBACTAM 25 GRAM(S): 4; .5 INJECTION, POWDER, LYOPHILIZED, FOR SOLUTION INTRAVENOUS at 07:12

## 2018-06-07 RX ADMIN — Medication 1: at 12:31

## 2018-06-07 RX ADMIN — Medication 5 MILLIGRAM(S): at 21:20

## 2018-06-07 RX ADMIN — Medication 5 MILLIGRAM(S): at 01:34

## 2018-06-07 RX ADMIN — MIDODRINE HYDROCHLORIDE 30 MILLIGRAM(S): 2.5 TABLET ORAL at 06:28

## 2018-06-07 RX ADMIN — SENNA PLUS 2 TABLET(S): 8.6 TABLET ORAL at 01:32

## 2018-06-07 RX ADMIN — Medication 1 SPRAY(S): at 12:31

## 2018-06-07 RX ADMIN — FINASTERIDE 5 MILLIGRAM(S): 5 TABLET, FILM COATED ORAL at 12:31

## 2018-06-07 RX ADMIN — VASOPRESSIN 2.4 UNIT(S)/MIN: 20 INJECTION INTRAVENOUS at 20:06

## 2018-06-07 RX ADMIN — Medication 15.97 MICROGRAM(S)/KG/MIN: at 20:07

## 2018-06-07 RX ADMIN — ATORVASTATIN CALCIUM 80 MILLIGRAM(S): 80 TABLET, FILM COATED ORAL at 01:33

## 2018-06-07 RX ADMIN — Medication 1 SPRAY(S): at 01:32

## 2018-06-07 RX ADMIN — PANTOPRAZOLE SODIUM 40 MILLIGRAM(S): 20 TABLET, DELAYED RELEASE ORAL at 06:26

## 2018-06-07 RX ADMIN — Medication 100 MILLIGRAM(S): at 06:26

## 2018-06-07 RX ADMIN — Medication 79.88 MICROGRAM(S)/KG/MIN: at 20:06

## 2018-06-07 RX ADMIN — Medication 79.88 MICROGRAM(S)/KG/MIN: at 11:00

## 2018-06-07 RX ADMIN — Medication 1 SPRAY(S): at 18:26

## 2018-06-07 RX ADMIN — Medication 15.97 MICROGRAM(S)/KG/MIN: at 12:00

## 2018-06-07 RX ADMIN — Medication 5 MILLIGRAM(S): at 01:32

## 2018-06-07 RX ADMIN — MIDODRINE HYDROCHLORIDE 30 MILLIGRAM(S): 2.5 TABLET ORAL at 21:20

## 2018-06-07 RX ADMIN — SENNA PLUS 2 TABLET(S): 8.6 TABLET ORAL at 21:20

## 2018-06-07 RX ADMIN — MIDODRINE HYDROCHLORIDE 30 MILLIGRAM(S): 2.5 TABLET ORAL at 01:33

## 2018-06-07 NOTE — PROGRESS NOTE ADULT - ASSESSMENT
·	End Stage Renal Disease on Dialysis + congestive heart failure with hypotension on dobutamine gtt with signs of fluid overload,  ·	anemia hb stable  ·	congestive heart failure on dobutamine drip, follow up with card  ·	Suspected sepsis, with cholecystitis w/u pending, not able to tolerate HIDA

## 2018-06-07 NOTE — PROGRESS NOTE ADULT - SUBJECTIVE AND OBJECTIVE BOX
Patient seen and examined. He was able to answer my questions. Told me that he does not like the BIPAP. No abdominal pain currently. His wife, sister and son are in the room, await family meeting w/ Palliative team.     Medications:  ALBUTerol/ipratropium for Nebulization 3 milliLiter(s) Nebulizer every 6 hours PRN  amiodarone    Tablet 100 milliGRAM(s) Oral daily  atorvastatin 80 milliGRAM(s) Oral at bedtime  Biotene Dry Mouth Oral Rinse 5 milliLiter(s) Swish and Spit four times a day PRN  bisacodyl 5 milliGRAM(s) Oral at bedtime  chlorhexidine 4% Liquid 1 Application(s) Topical <User Schedule>  dextrose 40% Gel 15 Gram(s) Oral once PRN  dextrose 50% Injectable 12.5 Gram(s) IV Push once  dextrose 50% Injectable 25 Gram(s) IV Push once  dextrose 50% Injectable 25 Gram(s) IV Push once  DOBUTamine Infusion 7.5 MICROgram(s)/kG/Min IV Continuous <Continuous>  docusate sodium 100 milliGRAM(s) Oral two times a day  finasteride 5 milliGRAM(s) Oral daily  insulin lispro (HumaLOG) corrective regimen sliding scale   SubCutaneous three times a day before meals  insulin lispro (HumaLOG) corrective regimen sliding scale   SubCutaneous at bedtime  levothyroxine 75 MICROGram(s) Oral daily  midodrine 30 milliGRAM(s) Oral three times a day  norepinephrine Infusion 1.2 MICROgram(s)/kG/Min IV Continuous <Continuous>  pantoprazole    Tablet 40 milliGRAM(s) Oral before breakfast  piperacillin/tazobactam IVPB. 3.375 Gram(s) IV Intermittent every 12 hours  senna 2 Tablet(s) Oral at bedtime  sevelamer hydrochloride 1600 milliGRAM(s) Oral <User Schedule>  sodium chloride 0.65% Nasal 1 Spray(s) Both Nostrils four times a day  vasopressin Infusion 0.04 Unit(s)/Min IV Continuous <Continuous>      Vitals:  Vital Signs Last 24 Hrs  T(C): 36.5 (07 Jun 2018 11:18), Max: 37.7 (06 Jun 2018 16:00)  T(F): 97.7 (07 Jun 2018 11:18), Max: 99.9 (06 Jun 2018 16:00)  HR: 111 (07 Jun 2018 12:00) (97 - 123)  BP: 110/56 (07 Jun 2018 10:00) (89/52 - 120/78)  BP(mean): 70 (07 Jun 2018 10:00) (57 - 96)  RR: 23 (07 Jun 2018 12:00) (11 - 36)  SpO2: 100% (07 Jun 2018 12:00) (86% - 100%)    Daily     Daily     I&O's Detail    06 Jun 2018 07:01  -  07 Jun 2018 07:00  --------------------------------------------------------  IN:    DOBUTamine Infusion: 160 mL    norepinephrine Infusion: 738.2 mL    Other: 400 mL    vasopressin Infusion: 24 mL  Total IN: 1322.2 mL    OUT:    Other: 4400 mL  Total OUT: 4400 mL    Total NET: -3077.8 mL      07 Jun 2018 07:01  -  07 Jun 2018 13:17  --------------------------------------------------------  IN:    DOBUTamine Infusion: 64 mL    norepinephrine Infusion: 291 mL    vasopressin Infusion: 2.4 mL    vasopressin Infusion: 4.8 mL  Total IN: 362.2 mL    OUT:  Total OUT: 0 mL    Total NET: 362.2 mL          Physical Exam:     General: No distress. Comfortable.  HEENT: EOM intact.  Neck: Neck supple. JVP mildly elevated. No masses  Chest: Clear to auscultation bilaterally  CV: Normal S1 and S2. No murmurs, rub, or gallops. Radial pulses normal. No LE edema b/l.   Abdomen: Soft, non-distended, non-tender  Skin: No rashes or skin breakdown  Neurology: Alert and oriented times three. Sensation intact  Psych: Affect normal    Labs:                        12.3   16.13 )-----------( 137      ( 07 Jun 2018 05:30 )             36.6     06-07    131<L>  |  91<L>  |  23  ----------------------------<  118<H>  3.8   |  19<L>  |  3.90<H>    Ca    8.8      07 Jun 2018 05:30  Phos  2.7     06-07  Mg     1.9     06-07    TPro  8.4<H>  /  Alb  3.1<L>  /  TBili  2.6<H>  /  DBili  x   /  AST  60<H>  /  ALT  31  /  AlkPhos  487<H>  06-07    PT/INR - ( 07 Jun 2018 05:30 )   PT: 97.5 SEC;   INR: 8.41          PTT - ( 07 Jun 2018 05:30 )  PTT:72.8 SEC

## 2018-06-07 NOTE — PROGRESS NOTE ADULT - PROBLEM SELECTOR PLAN 1
Is hypotensive today: on vasopressin as well as norepinephrine to map > 65 mm Hg.  6/6: has suspicion of cholecystitis: Pt is refusing for HIDA scan: He is on broad spectrum antibiotics: Defer to surgery and primary tea for the same: Cont supportive treatment: He remans on vasopressors at this time:"  6/7: cont vasopresors as well as dobutamine

## 2018-06-07 NOTE — PROGRESS NOTE ADULT - PROBLEM SELECTOR PLAN 1
-WBC trending up 16.13, INR trending up despite vitamin K 5 mg po x 1 yesterday, INR 8.41. Tachycardic 111-120.  -Currently on dobutamine 7.5 mcg/kg/min, norepinephrine and vasopressin- same dosages as yesterday.  Target MAP 65. CCU team to wean off pressors as BP allows. Midodrine to be continued at 30 mg po TID.  -No BB while on . Unable to tolerate ACEI/ARB/ARNI due to low BP.   -He has not been an advanced HF therapy (LVAD/transplant) candidate in past due to severe interstitial lung disease. After numerous attempts to optimize HF, pt still has symptoms of SOB- likely combination of both ADHF and severe interstitial disease. Palliative approach is most appropriate for this patient.   -Continue fluid removal via HD. -WBC trending up 16.13, INR trending up despite vitamin K 5 mg po x 1 yesterday, INR 8.41. Tachycardic 111-120.  -Currently on dobutamine 7.5 mcg/kg/min, norepinephrine and vasopressin- same dosages as yesterday.  Target MAP 65. CCU team to wean off pressors as BP allows. Midodrine to be continued at 30 mg po TID.  -No BB while on . Unable to tolerate ACEI/ARB/ARNI due to low BP.   -He has not been an advanced HF therapy (LVAD/transplant) candidate in past due to severe interstitial lung disease. After numerous attempts to optimize HF, pt still has symptoms of SOB- likely combination of both ADHF and severe interstitial disease. Palliative approach is most appropriate for this patient.   -Continue fluid removal via HD. 3 L net negative via HD yesterday.

## 2018-06-07 NOTE — PROGRESS NOTE ADULT - SUBJECTIVE AND OBJECTIVE BOX
Follow Up:      [place] History/ROS:Patient is a 63y old  Male who presents with a chief complaint of SOB (29 May 2018 15:55)  Requiring levophed/ dubatamine/ BIPAP    Family does not want cholecystotomy at this time.      Allergies    No Known Allergies    Intolerances        ANTIMICROBIALS:  gentamicin   IVPB 150 once  piperacillin/tazobactam IVPB. 3.375 every 12 hours      OTHER MEDS:  ALBUTerol/ipratropium for Nebulization 3 milliLiter(s) Nebulizer every 6 hours PRN  amiodarone    Tablet 100 milliGRAM(s) Oral daily  atorvastatin 80 milliGRAM(s) Oral at bedtime  Biotene Dry Mouth Oral Rinse 5 milliLiter(s) Swish and Spit four times a day PRN  bisacodyl 5 milliGRAM(s) Oral at bedtime  chlorhexidine 4% Liquid 1 Application(s) Topical <User Schedule>  dextrose 40% Gel 15 Gram(s) Oral once PRN  dextrose 50% Injectable 12.5 Gram(s) IV Push once  dextrose 50% Injectable 25 Gram(s) IV Push once  dextrose 50% Injectable 25 Gram(s) IV Push once  DOBUTamine Infusion 7.5 MICROgram(s)/kG/Min IV Continuous <Continuous>  docusate sodium 100 milliGRAM(s) Oral two times a day  finasteride 5 milliGRAM(s) Oral daily  insulin lispro (HumaLOG) corrective regimen sliding scale   SubCutaneous three times a day before meals  insulin lispro (HumaLOG) corrective regimen sliding scale   SubCutaneous at bedtime  levothyroxine 75 MICROGram(s) Oral daily  midodrine 30 milliGRAM(s) Oral three times a day  norepinephrine Infusion 1.2 MICROgram(s)/kG/Min IV Continuous <Continuous>  pantoprazole    Tablet 40 milliGRAM(s) Oral before breakfast  senna 2 Tablet(s) Oral at bedtime  sevelamer hydrochloride 1600 milliGRAM(s) Oral <User Schedule>  sodium chloride 0.65% Nasal 1 Spray(s) Both Nostrils four times a day  vasopressin Infusion 0.04 Unit(s)/Min IV Continuous <Continuous>      Vital Signs Last 24 Hrs  T(C): 36.7 (07 Jun 2018 16:10), Max: 37.1 (07 Jun 2018 08:00)  T(F): 98.1 (07 Jun 2018 16:10), Max: 98.7 (07 Jun 2018 08:00)  HR: 114 (07 Jun 2018 16:10) (97 - 122)  BP: 110/56 (07 Jun 2018 10:00) (89/52 - 120/78)  BP(mean): 70 (07 Jun 2018 10:00) (57 - 96)  RR: 0 (07 Jun 2018 16:10) (0 - 36)  SpO2: 96% (07 Jun 2018 16:10) (86% - 100%)    PHYSICAL EXAM:  General: [ ] non-toxic  HEAD/EYES: [ ] PERRL [x ] white sclera [ ] icterus  ENT:  [ ] normal [ x] supple [ ] thrush [ ] pharyngeal exudate  Cardiovascular:   [ ] murmur x[ ] normal [ ] PPM/AICD  Respiratory:  [x ] clear to ausculation bilaterally  GI:  [x ] soft, tender to RUQ, normal bowel sounds  :  [ ] rosa [ ] no CVA tenderness   Musculoskeletal:  [ ] no synovitis  Neurologic:  [ ] non-focal exam   Skin:  [ ] no rash  Lymph: [ ] no lymphadenopathy  Psychiatric:  [x ] appropriate affect [ ] alert & oriented  Lines:  [ x] no phlebitis [ ] central line                                12.3   16.13 )-----------( 137      ( 07 Jun 2018 05:30 )             36.6       06-07    131<L>  |  91<L>  |  23  ----------------------------<  118<H>  3.8   |  19<L>  |  3.90<H>    Ca    8.8      07 Jun 2018 05:30  Phos  2.7     06-07  Mg     1.9     06-07    TPro  8.4<H>  /  Alb  3.1<L>  /  TBili  2.6<H>  /  DBili  x   /  AST  60<H>  /  ALT  31  /  AlkPhos  487<H>  06-07          MICROBIOLOGY:    RADIOLOGY:

## 2018-06-07 NOTE — PROGRESS NOTE ADULT - SUBJECTIVE AND OBJECTIVE BOX
Patient is a 63y old  Male who presents with a chief complaint of SOB (29 May 2018 15:55)      Any change in ROS: Remains on high dose vasopressors: on two vasopresors as well as dobutamine:  Could not tolerate HIDA LAST NIGHT: currently ON BIPAP : WIFE AT BEDSIDE:  SHE STILL DOES NOT HAVE LUNG BIOPSY REPORT:     MEDICATIONS  (STANDING):  amiodarone    Tablet 100 milliGRAM(s) Oral daily  atorvastatin 80 milliGRAM(s) Oral at bedtime  bisacodyl 5 milliGRAM(s) Oral at bedtime  chlorhexidine 4% Liquid 1 Application(s) Topical <User Schedule>  dextrose 50% Injectable 12.5 Gram(s) IV Push once  dextrose 50% Injectable 25 Gram(s) IV Push once  dextrose 50% Injectable 25 Gram(s) IV Push once  DOBUTamine Infusion 7.5 MICROgram(s)/kG/Min (15.975 mL/Hr) IV Continuous <Continuous>  docusate sodium 100 milliGRAM(s) Oral two times a day  finasteride 5 milliGRAM(s) Oral daily  insulin lispro (HumaLOG) corrective regimen sliding scale   SubCutaneous three times a day before meals  insulin lispro (HumaLOG) corrective regimen sliding scale   SubCutaneous at bedtime  levothyroxine 75 MICROGram(s) Oral daily  midodrine 30 milliGRAM(s) Oral three times a day  norepinephrine Infusion 1.2 MICROgram(s)/kG/Min (79.875 mL/Hr) IV Continuous <Continuous>  pantoprazole    Tablet 40 milliGRAM(s) Oral before breakfast  piperacillin/tazobactam IVPB. 3.375 Gram(s) IV Intermittent every 12 hours  senna 2 Tablet(s) Oral at bedtime  sevelamer hydrochloride 1600 milliGRAM(s) Oral <User Schedule>  sodium chloride 0.65% Nasal 1 Spray(s) Both Nostrils four times a day  vasopressin Infusion 0.04 Unit(s)/Min (2.4 mL/Hr) IV Continuous <Continuous>    MEDICATIONS  (PRN):  ALBUTerol/ipratropium for Nebulization 3 milliLiter(s) Nebulizer every 6 hours PRN Shortness of Breath and/or Wheezing  Biotene Dry Mouth Oral Rinse 5 milliLiter(s) Swish and Spit four times a day PRN dry mouth  dextrose 40% Gel 15 Gram(s) Oral once PRN Blood Glucose LESS THAN 70 milliGRAM(s)/deciliter    Vital Signs Last 24 Hrs  T(C): 36.5 (07 Jun 2018 11:18), Max: 37.7 (06 Jun 2018 16:00)  T(F): 97.7 (07 Jun 2018 11:18), Max: 99.9 (06 Jun 2018 16:00)  HR: 111 (07 Jun 2018 12:00) (97 - 123)  BP: 110/56 (07 Jun 2018 10:00) (89/52 - 120/78)  BP(mean): 70 (07 Jun 2018 10:00) (57 - 96)  RR: 23 (07 Jun 2018 12:00) (11 - 36)  SpO2: 100% (07 Jun 2018 12:00) (86% - 100%)    I&O's Summary    06 Jun 2018 07:01  -  07 Jun 2018 07:00  --------------------------------------------------------  IN: 1322.2 mL / OUT: 4400 mL / NET: -3077.8 mL    07 Jun 2018 07:01  -  07 Jun 2018 13:32  --------------------------------------------------------  IN: 362.2 mL / OUT: 0 mL / NET: 362.2 mL          Physical Exam:   GENERAL: NAD, well-groomed, well-developed  HEENT: BELL/   Atraumatic, Normocephalic  ENMT: No tonsillar erythema, exudates, or enlargement; Moist mucous membranes, Good dentition, No lesions  NECK: Supple, No JVD, Normal thyroid  CHEST/LUNG: crackles bilaterally+  CVS: Regular rate and rhythm; No murmurs, rubs, or gallops  GI: : Soft, Nontender, Nondistended; Bowel sounds present  NERVOUS SYSTEM: lethargic on bipap Oriented X21  EXTREMITIES:  + edema  LYMPH: No lymphadenopathy noted  SKIN: No rashes or lesions  ENDOCRINOLOGY: No Thyromegaly  PSYCH: Appropriate    Labs:  ABG - ( 07 Jun 2018 05:30 )  pH, Arterial: 7.35  pH, Blood: x     /  pCO2: 42    /  pO2: 83    / HCO3: 22    / Base Excess: -2.7  /  SaO2: 94.8            17, 20, 22, 26, 26, 24, 23                            12.3   16.13 )-----------( 137      ( 07 Jun 2018 05:30 )             36.6                         11.9   14.42 )-----------( 156      ( 06 Jun 2018 03:05 )             37.5                         12.4   15.19 )-----------( 160      ( 05 Jun 2018 05:00 )             39.6                         12.3   16.02 )-----------( 152      ( 04 Jun 2018 19:33 )             39.7                         12.7   15.07 )-----------( 148      ( 04 Jun 2018 04:20 )             39.7     06-07    131<L>  |  91<L>  |  23  ----------------------------<  118<H>  3.8   |  19<L>  |  3.90<H>  06-06    127<L>  |  89<L>  |  40<H>  ----------------------------<  197<H>  4.9   |  17<L>  |  6.09<H>  06-06    126<L>  |  87<L>  |  36<H>  ----------------------------<  253<H>  4.6   |  18<L>  |  5.44<H>  06-05    128<L>  |  88<L>  |  29<H>  ----------------------------<  265<H>  4.4   |  22  |  4.58<H>  06-04    127<L>  |  89<L>  |  26<H>  ----------------------------<  359<H>  4.2   |  23  |  4.06<H>  06-04    126<L>  |  88<L>  |  45<H>  ----------------------------<  223<H>  5.2   |  20<L>  |  6.49<H>    Ca    8.8      07 Jun 2018 05:30  Ca    9.0      06 Jun 2018 21:50  Ca    8.9      06 Jun 2018 03:05  Phos  2.7     06-07  Phos  4.2     06-06  Phos  3.1     06-06  Mg     1.9     06-07  Mg     2.3     06-06  Mg     2.2     06-06    TPro  8.4<H>  /  Alb  3.1<L>  /  TBili  2.6<H>  /  DBili  x   /  AST  60<H>  /  ALT  31  /  AlkPhos  487<H>  06-07  TPro  8.0  /  Alb  3.1<L>  /  TBili  0.5  /  DBili  x   /  AST  20  /  ALT  16  /  AlkPhos  224<H>  06-06  TPro  8.2  /  Alb  3.0<L>  /  TBili  0.6  /  DBili  x   /  AST  20  /  ALT  18  /  AlkPhos  224<H>  06-05  TPro  7.7  /  Alb  2.7<L>  /  TBili  0.6  /  DBili  x   /  AST  20  /  ALT  18  /  AlkPhos  213<H>  06-04  TPro  7.8  /  Alb  2.7<L>  /  TBili  0.5  /  DBili  x   /  AST  15  /  ALT  15  /  AlkPhos  194<H>  06-04    CAPILLARY BLOOD GLUCOSE      POCT Blood Glucose.: 158 mg/dL (07 Jun 2018 12:25)  POCT Blood Glucose.: 120 mg/dL (07 Jun 2018 08:24)  POCT Blood Glucose.: 104 mg/dL (07 Jun 2018 01:37)  POCT Blood Glucose.: 219 mg/dL (06 Jun 2018 17:30)      LIVER FUNCTIONS - ( 07 Jun 2018 05:30 )  Alb: 3.1 g/dL / Pro: 8.4 g/dL / ALK PHOS: 487 u/L / ALT: 31 u/L / AST: 60 u/L / GGT: x           PT/INR - ( 07 Jun 2018 05:30 )   PT: 97.5 SEC;   INR: 8.41          PTT - ( 07 Jun 2018 05:30 )  PTT:72.8 SEC    Procalcitonin, Serum: 8.77 ng/mL (06-04 @ 19:30)  Lactate, Blood: 0.7 mmol/L (06-04 @ 11:30)        RECENT CULTURES:  06-05 @ 18:18 BLOOD                  NO ORGANISMS ISOLATED  NO ORGANISMS ISOLATED AT 24 HOURS  06-04 @ 05:38 BLOOD PERIPHERAL                  NO ORGANISMS ISOLATED  NO ORGANISMS ISOLATED AT 72 HRS.  06-02 @ 15:34 BLOOD VENOUS                  NO ORGANISMS ISOLATED  NO ORGANISMS ISOLATED AT 96 HOURS  06-02 @ 10:56 BLOOD-CATHETER                  NO ORGANISMS ISOLATED        RESPIRATORY CULTURES:          Studies  Chest X-RAY  CT SCAN Chest   Venous Dopplers: LE:   CT Abdomen  Others    < from: NM Hepatobiliary Scan w/wo Gall Bladder (06.06.18 @ 21:21) >  TOBILIARY IMG        PROCEDURE DATE:  Jun 6 2018       INTERPRETATION:  RADIOPHARMACEUTICAL: 3.1 mCi Tc-99m-Mebrofenin, I.V.    CLINICAL INFORMATION: 63-year-old male with abdominal pain, evaluate for   acute cholecystitis.    TECHNIQUE:  Dynamic imaging of the anterior abdomen was performed for 40   minutes following injection of radiotracer. The imaging procedure was   terminated early because the patient's clinical condition deteriorated.    COMPARISON: No prior hepatobiliary scan is available for comparison.     FINDINGS: The 40 minutes of dynamic imaging demonstrates homogeneous   radiotracer uptake by the liver.    IMPRESSION: Incomplete study.    Imaging procedure terminated early due to deterioration of patient's   clinical condition.          < from: Xray Chest 1 View- PORTABLE-Urgent (06.04.18 @ 20:22) >  from Tami 3, 2018.    TECHNIQUE:   AP Portable chest x-ray.    INTERPRETATION:     Heart size cannot be accurately evaluated on this image however the   cardiac silhouette isprobably enlarged.  A left chest wall ICD is again seen. The generator obscures part of the   left lung.  A right IJ catheter is unchanged in position.  Bilateral increased fine reticular opacities consistent with history of   interstitial lung disease are again noted. Superimposed edema and/or   infection is not excluded.  Small loculated right pleural effusion with likely associated passive   atelectasis is not significantly changed.  No pneumothorax seen.              IMPRESSION:  No significant interval change.    Bilateral increased fine reticular opacities consistent with history of   interstitial lung disease are again noted. Superimposed edema and/or   infection are not excluded.    Unchanged small loculated right pleural effusion with likely associated   passive atelectasis.                  FLORENCE BRANNON M.D., ATTENDING RADIOLOGIST  This document has been electronically signed. Jun 5 2018  8:31AM        < end of copied text >                  HEMA FARRIS M.D., NUCLEAR MEDICINE ATTENDING  This document has been electronically signed. Jun 6 2018 10:24PM        < end of copied text >

## 2018-06-07 NOTE — PROGRESS NOTE ADULT - ATTENDING COMMENTS
events noted: Had a detailed discussion with the sister: Given my office number too: She will get me the official lung biopsy report from [facility]:  6/4: As above:  6/5: Doing poorly: in shock and on multiple pressors with dobutamine though alert and awake: Cont current supportive care:  6/6: pt is acutely ill , and now with suspicion of acute cholecystitis: Pt has been refusing fro HIDA scan: On antibiotics: cont current supportive care in CCU:  6/7: Has multiple organ failure with coagulopathy: And ac cholecystitis: Cont current care per CCU team

## 2018-06-07 NOTE — PROGRESS NOTE ADULT - ASSESSMENT
64 y/o male w/ PMHX of MI in past (Per patient, [facility], [facility]) no stents, HFrEF (LVEF 15%, LVIDd 6.0 cm, w/ AICD) severe TR, on chronic home dobutamine 7.5 mcg/kg/min,  ESRD on HD (3-4 x a week), HLD, DM II, a.fib on coumadin, interstitial pulmonary lung disease on chronic home O2, hypotension on midodrine comes in with epistaxis. Epistaxis is now resolved. He was found to be hypervolemic, and was given UF the same night, and HD the next day. He will have HD today as well. He has been admitted 6 times this year for various reasons, but mostly for SOB.  RHC 17 on 2.5 mcg/kg/min of dobutamine 2.5 mcg/kg/min showed RA 25, PCWP 25, PA 61/29/39, PA sat 42.8%, CI 1.65, CO 3.60, PVR 3.84. Currently He is on  7.5 mcg/kg/min. He admits to SOB at rest sometimes, but mostly with minimal exertion (typically walking 10 steps), orthopnea. No CP, palpitations, dizziness or syncope. ICD does not reveal any events. Patient had RHC and showed RA 14-20, PCWP 22, PA 44/18/28, AO sat 92%, PA sat 58.3%, CO 4.66, CI 2.46, -based on these numberse, Dobutamine was decreased to 5 mcg/kg/min on 18. Hemodynamics on - CVP 8, PA sat 63.6%,CO 5.25, CI 2.76. Dobutamine was further decreased to 2.5 mcg/kg/min, but patient experienced abdominal pain, dobutamine was increased back to 5 mcg/kg/min on  evening. Dobutamine was further weaned down to 2.5 mcg/kg/min. Abdominal pain was investigated via CT abdomen, pelvis-possible acute cholecystitis suspected. Pt was febrile and had leukocytosis, emperic coverage w/ vanco and Zosyn initiated. HD continued. Palliative care team consult called.

## 2018-06-07 NOTE — PROGRESS NOTE ADULT - SUBJECTIVE AND OBJECTIVE BOX
SUBJECTIVE:  Attempted HIDA last night and was unsuccessful Reportedly anxious. Perc Cholecystostomy offered at present pt refusing. No CP. Dyspnea on exertion chonic and unchanged Pt subjectively feels a little better. pressor requirements being weaned. 	    MEDICATIONS:  amiodarone    Tablet 100 milliGRAM(s) Oral daily  DOBUTamine Infusion 7.5 MICROgram(s)/kG/Min IV Continuous <Continuous>  midodrine 30 milliGRAM(s) Oral three times a day  norepinephrine Infusion 1.2 MICROgram(s)/kG/Min IV Continuous <Continuous>    piperacillin/tazobactam IVPB. 3.375 Gram(s) IV Intermittent every 12 hours    ALBUTerol/ipratropium for Nebulization 3 milliLiter(s) Nebulizer every 6 hours PRN      bisacodyl 5 milliGRAM(s) Oral at bedtime  docusate sodium 100 milliGRAM(s) Oral two times a day  pantoprazole    Tablet 40 milliGRAM(s) Oral before breakfast  senna 2 Tablet(s) Oral at bedtime    atorvastatin 80 milliGRAM(s) Oral at bedtime  dextrose 40% Gel 15 Gram(s) Oral once PRN  dextrose 50% Injectable 12.5 Gram(s) IV Push once  dextrose 50% Injectable 25 Gram(s) IV Push once  dextrose 50% Injectable 25 Gram(s) IV Push once  finasteride 5 milliGRAM(s) Oral daily  insulin lispro (HumaLOG) corrective regimen sliding scale   SubCutaneous three times a day before meals  insulin lispro (HumaLOG) corrective regimen sliding scale   SubCutaneous at bedtime  levothyroxine 75 MICROGram(s) Oral daily  vasopressin Infusion 0.02 Unit(s)/Min IV Continuous <Continuous>    Biotene Dry Mouth Oral Rinse 5 milliLiter(s) Swish and Spit four times a day PRN  chlorhexidine 4% Liquid 1 Application(s) Topical <User Schedule>  sodium chloride 0.65% Nasal 1 Spray(s) Both Nostrils four times a day      REVIEW OF SYSTEMS:    CONSTITUTIONAL: No fever, weight loss, or fatigue  EYES: No eye pain, visual disturbances, or discharge  NECK: No pain or stiffness  RESPIRATORY: No cough, wheezing, chills or hemoptysis; No Shortness of Breath  CARDIOVASCULAR: No chest pain, palpitations, dizziness, or leg swelling  GASTROINTESTINAL: No abdominal or epigastric pain. No nausea, vomiting, or hematemesis; No diarrhea or constipation. No melena or hematochezia.  GENITOURINARY: No dysuria, frequency, hematuria, or incontinence  NEUROLOGICAL: No headaches, memory loss, loss of strength, numbness, or tremors  SKIN: No itching, burning, rashes, or lesions   LYMPH Nodes: No enlarged glands  MUSCULOSKELETAL: No joint pain or swelling; No muscle, back, or extremity pain  All other review of systems are negative.  	  [ ] Unable to obtain    PHYSICAL EXAM:  T(C): 36.4 (06-07-18 @ 01:23), Max: 37.7 (06-06-18 @ 16:00)  HR: 117 (06-07-18 @ 06:00) (97 - 123)  BP: 103/51 (06-07-18 @ 06:00) (90/49 - 120/78)  RR: 23 (06-07-18 @ 06:00) (11 - 36)  SpO2: 97% (06-07-18 @ 06:00) (64% - 99%)  Wt(kg): --  I&O's Summary    06 Jun 2018 07:01  -  07 Jun 2018 07:00  --------------------------------------------------------  IN: 1322.2 mL / OUT: 4400 mL / NET: -3077.8 mL          PHYSICAL EXAM    Appearance: Normal	  HEENT:   Normal oral mucosa, PERRL, EOMI	  NECK: Soft and supple, No LAD, No JVD  Cardiovascular: IRegular Rate and Rhythm, Normal S1 S2, No murmurs, No clicks, gallops or rubs  Respiratory: Reduced BS at bases	  Gastrointestinal:  Soft, Non-tender, + BS	  Skin: No rashes, No ecchymoses, No cyanosis  Neurologic: Non-focal  Extremities: No clubbing, cyanosis or edema  Vascular: Peripheral pulses palpable 2+ bilaterally    LABS:	 	                          12.3   16.13 )-----------( 137      ( 07 Jun 2018 05:30 )             36.6     06-07    131<L>  |  91<L>  |  23  ----------------------------<  118<H>  3.8   |  19<L>  |  3.90<H>    Ca    8.8      07 Jun 2018 05:30  Phos  2.7     06-07  Mg     1.9     06-07    TPro  8.4<H>  /  Alb  3.1<L>  /  TBili  2.6<H>  /  DBili  x   /  AST  60<H>  /  ALT  31  /  AlkPhos  487<H>  06-07

## 2018-06-07 NOTE — PROGRESS NOTE ADULT - ASSESSMENT
63 year old with heart failure with hypotnesion (requiring pressors/ inotrophs), leukocytosis and abdominal pain.    His pain localizes to the right side and his imaging raises a concern for a thickened gallbladder.    High suspicion for cholecystitis.    Not responding to abx. Likely needs source control.     Antibiotics may not be sufficient if this is cholecystis.    Continue zosyn/ vanco by level.  Gent times on pending repeat blood culture.  Family does not want cholecystotomy at this time    Prognosis grave.

## 2018-06-07 NOTE — PROGRESS NOTE ADULT - PROBLEM SELECTOR PLAN 1
Pt tolerated HD overnight, with 4kg UF achieved.  low Na 2/2 hypervolemia.   Plan for repeat HD tomorrow, try to similar UF goals.   c/w renal diet , fluid restriction 1L/day

## 2018-06-07 NOTE — PROGRESS NOTE ADULT - ASSESSMENT
63 year old man with ESRD (HD MWF), NICM (EF 21%) s/p ICD, PICC line in place with home dobutamine drip, atrial fibrillation on coumadin, COPD on home O2 (4-5L), pulmonary fibrosis with increasing shortness of breath and severe epistaxis.  No more epistaxis.  Continue dobutamine. Would strongly advise against down titration attempts as in the past this was not well tolerated.  Consider restoration of outpatient dose without titration.   Echo results unchanged from previous study.  EF is 15%. NYHA Class IV ACC AHA Stage D  On Midodrine  Unable to attempt GDMT (ACE/ARB/ARNI/BB) given hypotension. Now requiring Levophed - possible vasodilation secondary to sepsis  Severe ILD precludes ability to implant LVAD  HD per renal, 4L removed yesterday  BIPAP per Pulmonary  Appears to have cholecystitis - ID and Surgery f/u appreciated HIDA scan was attempted but unable to tolerate. Follow INR and continue to encourage perc cholecystostomy.   Appreciate CCU care and multiple consultants follow up. D/W CCU resident in detail  Continue present care

## 2018-06-07 NOTE — PROGRESS NOTE ADULT - SUBJECTIVE AND OBJECTIVE BOX
Pt seen and examined at bedside  Overnight events noted. Could not tolerate HIDA.   Dyspnea unchanged, on Bipap.     Allergies:  No Known Allergies    Hospital Medications:   MEDICATIONS  (STANDING):  amiodarone    Tablet 100 milliGRAM(s) Oral daily  atorvastatin 80 milliGRAM(s) Oral at bedtime  bisacodyl 5 milliGRAM(s) Oral at bedtime  chlorhexidine 4% Liquid 1 Application(s) Topical <User Schedule>  dextrose 50% Injectable 12.5 Gram(s) IV Push once  dextrose 50% Injectable 25 Gram(s) IV Push once  dextrose 50% Injectable 25 Gram(s) IV Push once  DOBUTamine Infusion 7.5 MICROgram(s)/kG/Min (15.975 mL/Hr) IV Continuous <Continuous>  docusate sodium 100 milliGRAM(s) Oral two times a day  finasteride 5 milliGRAM(s) Oral daily  insulin lispro (HumaLOG) corrective regimen sliding scale   SubCutaneous three times a day before meals  insulin lispro (HumaLOG) corrective regimen sliding scale   SubCutaneous at bedtime  levothyroxine 75 MICROGram(s) Oral daily  midodrine 30 milliGRAM(s) Oral three times a day  norepinephrine Infusion 1.2 MICROgram(s)/kG/Min (79.875 mL/Hr) IV Continuous <Continuous>  pantoprazole    Tablet 40 milliGRAM(s) Oral before breakfast  piperacillin/tazobactam IVPB. 3.375 Gram(s) IV Intermittent every 12 hours  senna 2 Tablet(s) Oral at bedtime  sevelamer hydrochloride 1600 milliGRAM(s) Oral <User Schedule>  sodium chloride 0.65% Nasal 1 Spray(s) Both Nostrils four times a day  vasopressin Infusion 0.04 Unit(s)/Min (2.4 mL/Hr) IV Continuous <Continuous>    VITALS:  T(F): 97.7 (06-07-18 @ 11:18), Max: 99.9 (06-06-18 @ 16:00)  HR: 113 (06-07-18 @ 14:00)  BP: 110/56 (06-07-18 @ 10:00)  RR: 22 (06-07-18 @ 14:00)  SpO2: 100% (06-07-18 @ 14:00)  Wt(kg): --    06-06 @ 07:01  -  06-07 @ 07:00  --------------------------------------------------------  IN: 1322.2 mL / OUT: 4400 mL / NET: -3077.8 mL    06-07 @ 07:01  -  06-07 @ 14:29  --------------------------------------------------------  IN: 654.4 mL / OUT: 0 mL / NET: 654.4 mL    PHYSICAL EXAM:  Constitutional: NAD  HEENT: anicteric sclera, oropharynx clear, MMM  Neck: No JVD  Respiratory: CTAB, no wheezes, rales or rhonchi  Cardiovascular: S1, S2, RRR  Gastrointestinal: BS+, soft, NT/ND  Extremities: No cyanosis or clubbing. Trace peripheral edema  Neurological: A/O x 3, no focal deficits  Psychiatric: Normal mood, normal affect  : No CVA tenderness. No rosa.   Skin: No rashes  Vascular Access: RIJ Tunneled cath     LABS:  06-07    131<L>  |  91<L>  |  23  ----------------------------<  118<H>  3.8   |  19<L>  |  3.90<H>    Ca    8.8      07 Jun 2018 05:30  Phos  2.7     06-07  Mg     1.9     06-07    TPro  8.4<H>  /  Alb  3.1<L>  /  TBili  2.6<H>  /  DBili      /  AST  60<H>  /  ALT  31  /  AlkPhos  487<H>  06-07    Creatinine Trend: 3.90 <--, 6.09 <--, 5.44 <--, 4.58 <--, 4.06 <--, 6.49 <--, 5.38 <--, 4.20 <--, 4.87 <--                        12.3   16.13 )-----------( 137      ( 07 Jun 2018 05:30 )             36.6     Urine Studies:      RADIOLOGY & ADDITIONAL STUDIES:

## 2018-06-07 NOTE — PROGRESS NOTE ADULT - PROBLEM SELECTOR PLAN 2
6/3 febrile last night. WBC went further up. Empiric antibiotics started by CCU team.  6/5: on broad spectrum antibiotics: ID following CT chest noted:  6/6: ? sc cholecystitis: cont antibiotics:  6/7: Ac cholecystitis: on antibiotics: Could not tolerate HIDA last night: Cont current management: defer to ID as well as surgery

## 2018-06-07 NOTE — PROGRESS NOTE ADULT - PROBLEM SELECTOR PLAN 2
w/shock-   on Vasopressin and levo now, per CCU   Dobutamine drip as per HF team/CCU  on Midodrine 30mg tid

## 2018-06-07 NOTE — PROGRESS NOTE ADULT - ATTENDING COMMENTS
Continue inotropic and vasopressor support. Would continue volume removal with UF as tolerated. His prognosis remains poor. I discussed the care plan with him and his family.     Please call me with questions at [phone].

## 2018-06-07 NOTE — PROGRESS NOTE ADULT - PROBLEM SELECTOR PLAN 8
Cont BD : I don't think he needs steroids at this time: He also has underlying interstitial lung disease: steroids are not going to help ILD  : keep off steorids: given honeycombing, don't think steroids will work  ; Cont BD : pt has not been wheezin/2 stable. continue bronchodilators.  6/3 keep O2 sat between 88 to 92%  : on nebs prn: can start Symbicort 2 p twice a day  : no parenteral steroids especially when he has ac cholecystitis with shock:

## 2018-06-07 NOTE — PROGRESS NOTE ADULT - ASSESSMENT
63yM w severe HFrEF (on dobutamine gtt x3 yrs) w [employer] AICD, A-fib (on warfarin), ESRD on HD MWF (+Sat PRN), pulmonary fibrosis (on home O2), on  p/w epistaxis x1 day, also w acute-on-chronic SOB, found to be volume-overloaded despite reported med compliance, no dietary changes, and having gotten extra session of HD the day prior to admission. SBP 80s-90s in UEs, so in order to be monitored while undergoing fluid removal by HD/UF, was transferred to CCU.     Since admission, dyspnea improved.  gtt has been adjusted from 7.5 as low as 2.5, based on RHC measurements ([place]-Chris in place -). Course c/b abdominal pain and nausea, rising WBC, rectal temp >101, hypotension; BCx neg to date. Diff Dx for sepsis includes cholecystitis (favored by R-sided pain+tenderness and CT A/P, disfavored by LFTs wnl, US RUQ w sonographic Altamirano's negative), gut translocation of GI shelley (favored by R-sided pain), pneumonia (favored by worsening CT chest and by worsening resp status), and/or central-line-associated bloodstream infection (favored by temporal association w presence of R femoral line).     Pressor requirements stably high, pt very ill-appearing and lethargic today.     # Neuro - lethargy in setting of sepsis    # ENT - epistaxis, resolved; attachment for home humidification of nasal cannula sent to patient's home  --- continue humidification of nasal cannula, nasal saline spray  --- facilitate patient's getting simple mask to use for supplemental O2 at home    # Cardiac  - Severe bi-ventricular systolic heart failure w AICD in place, on chronic dobutamine gtt 7.5, LVEF 15% this admission.    --- Cont dobutamine 7.5  --- appreciate Heart-Failure recs  --- daily weights: standing weights if possible  --- out of bed to chair, increase activity as tolerated  - Hypotension in setting of sepsis  --- Cont home midodrine 30 TID  --- cont norepinephrine gtt and vasopressin 0.04, titrate to MAP 65-70  - Atrial fibrillation, on home warfarin 3mg QD  --- cont amiodarone 100mg QD  --- check INR QD, dose warfarin QD  ------- INR 6/5 supratherapeutic to >7 in the setting of no bleeding  ------- reverse INR w Vit K 5mg PO in anticipation of IR perc cholecystostomy   --- cont newly started PPI QD for GI protection while INR supratherapeutic  - Hyperlipidemia --- cont atorvastatin 80 daily  - BP discrepancy between UEs vs LEs  --- BP in lower extremities is significantly higher than in upper extremities, consistent w prior CCU admission; we think that LE BP is likely more accurate than UE BP. F/u [facility] duplex of UEs did not reveal any significant arterial stenosis.   ---- RUE arterial line placed   - RIJ non-occlusive clot vs fibrin sheath associated w Perma-cath, seen on CT 6/3 --- monitor clinically  - Splenic infarct seen on CT 6/3, potentially to atrial fibrillation and recently sub-therapeutic INR --- supportive care, monitor clinically  - MACE risk reduction --- cont ASA    # Pulm  - Hypoxia, likely multifactorial --- c/w nasal cannula during day, BiLevel at night and PRN  - Pulmonary fibrosis  --- per pulm, do not anticipate benefit from steroids based on unsuccessful trial in past  - ALONZO --- BiLevel at night and PRN, patient minimally compliant with worsening mixed respiratory and metabolic acidosis.  - Pleural effusion, R-sided --- increased from prior CT, now small-to-moderate    # GI  - Probable cholecystitis -- Abdominal pain, R-sided, achy, associated w tenderness to palpation, CT A/P c/f cholecystitis, US RUQ: Sonographic Altamirano's sign negative  --- consider HIDA  --- appreciate surgery recs  --- appreciate IR recs  -------- IR-guided percutaneous cholecystostomy tube placement , pending INR <2  --- order simethicone if needed for gas  --- order acetaminophen if needed for pain  --- avoiding Maalox and other aluminum-hydroxide-containing products due to ESRD   - Nausea and vomiting  --- order ondansetron and/or metoclopramide if needed   --- abd xray with non-obstructive gas pattern, no free air.  - Constipation --- cont senna, bisacodyl, docusate; add'l agents PRN    #  - BPH   --- cont finasteride  --- clarify whether pt needs finasteride given that he is anuric    # Renal - ESRD on HD  --- C/w HD qMWF, as well as Saturday prn and add'l fluid-removal PRN  --- C/w Carrie-Tiffanie, Sevelamer  --- appreciate nephrology recs  --- may need removal of tunneled catheter and chronic PICC in the setting of sepsis.    # Endocr  - Diabetes mellitus type 2  --- off insulin for years  --- monitor glucose on BMP and blood gases  - Hypothyroidism  --- cont levothyroxine    # Infectious Disease   - Sepsis (suspected due to rectal fever, hi WBC, low BP): diff dx includes acute cholecystitis, pneumonia, diverticulitis/gut translocation, and/or CLABSI  --- cont vanc by level  --- cont pip/tazo, renally adjusted  --- f/u BCx (NGTD)  --- Procalcitonin elevated, but un-interpretable in setting of ESRD  --- appreciate ID recs  - HBV vaccination not up to date --- consider HBV vaccination this admission    # VTE ppx: warfarin held in the setting of supra therapeutic INR.   # Dispo: CCU  # GOC: full code, discussed multiple times this admission and in past, including with palliative care 63yM w severe HFrEF (on dobutamine gtt x3 yrs) w [employer] AICD, A-fib (on warfarin), ESRD on HD MWF (+Sat PRN), pulmonary fibrosis (on home O2), on  p/w epistaxis x1 day, also w acute-on-chronic SOB, found to be volume-overloaded despite reported med compliance, no dietary changes, and having gotten extra session of HD the day prior to admission. SBP 80s-90s in UEs, so in order to be monitored while undergoing fluid removal by HD/UF, was transferred to CCU.  downtritrated as low as 2.5 w guidance from hemodynamics ([place] in place -).     Course c/b abdominal pain and nausea, rising WBC, rectal temp >101, hypotension; BCx neg to date. Diff Dx for sepsis includes cholecystitis (favored by R-sided pain+tenderness and CT A/P, disfavored by LFTs wnl, US RUQ w sonographic Altamirano's negative), gut translocation of GI shelley (favored by R-sided pain), pneumonia (favored by worsening CT chest and by worsening resp status), and/or central-line-associated bloodstream infection (favored by temporal association w presence of R femoral line).     Pressor requirements stably high, pt very ill-appearing and lethargic today. Best guess of cause of apparent sepsis is acute cholecystitis, per assessment of primary team as well as recs of ID and surgery. However, given his inability to tolerate HIDA 6/6 PM and his vocalized refusal of procedure today, pt seems unlikely to tolerate perc anurag without sedation which in turn would require intubation, from which patient seems unlikely to be able to wean.      Planning family meeting ~13:00 today.     # Neuro - lethargy in setting of suspected sepsis --- attempt to treat underlying cause    # ENT - epistaxis, resolved; attachment for home humidification of nasal cannula sent to patient's home  --- continue humidification of nasal cannula, nasal saline spray  --- facilitate patient's getting simple mask to use for supplemental O2 at home    # Cardiac  - Severe bi-ventricular systolic heart failure w AICD in place, on chronic dobutamine gtt 7.5, LVEF 15% this admission.    --- Cont dobutamine 7.5  --- appreciate Heart-Failure recs  --- daily weights: standing weights if possible  --- out of bed to chair, increase activity as tolerated  - Hypotension in setting of sepsis  --- Cont home midodrine 30 TID  --- cont norepinephrine gtt and vasopressin 0.04, titrate to MAP 65-70  - Atrial fibrillation, on home warfarin 3mg QD  --- cont amiodarone 100mg QD  --- check INR QD  ------- INR 6/5 supratherapeutic to >8 in the setting of no bleeding, despite Vit K PO 5mg this AM  ------- if decision is to pursue perc anurag, would require reversal with Prothrombin Complex Concentrate and possibly add'l Vit K   --- cont newly started PPI QD for GI protection while INR supratherapeutic  - Hyperlipidemia --- cont atorvastatin 80 daily  - BP discrepancy between UEs vs LEs  --- BP in lower extremities is significantly higher than in upper extremities, consistent w prior CCU admission; we think that LE BP is likely more accurate than UE BP. F/u [facility] duplex of UEs did not reveal any significant arterial stenosis.   ---- RUE arterial line placed   - RIJ non-occlusive clot vs fibrin sheath associated w Perma-cath, seen on CT 6/3 --- monitor clinically  - Splenic infarct seen on CT 6/3, potentially to atrial fibrillation and recently sub-therapeutic INR --- supportive care, monitor clinically  - MACE risk reduction --- cont ASA    # Pulm  - Hypoxia, likely multifactorial --- c/w nasal cannula during day, BiLevel at night and PRN  - Pulmonary fibrosis  --- per pulm, do not anticipate benefit from steroids based on unsuccessful trial in past  - ALONZO --- BiLevel at night and PRN, patient now compliant, not hypercarbic on recent ABGs even when lethargic and off BiLevel  - Pleural effusion, R-sided --- increased from prior CT, now small-to-moderate    # GI  - Probable cholecystitis -- Abdominal pain, R-sided, achy, associated w tenderness to palpation, CT A/P c/f cholecystitis, US RUQ: Sonographic Altamirano's sign negative  --- pt was unable to tolerate HIDA on   --- appreciate surgery recs: not a surgical candidate  --- appreciate IR recs  -------- still planned for IR-guided percutaneous cholecystostomy tube placement , pending INR <2  --- order simethicone if needed for gas  --- order acetaminophen if needed for pain  --- avoiding Maalox and other aluminum-hydroxide-containing products due to ESRD   - Nausea and vomiting  --- order ondansetron and/or metoclopramide if needed   --- abd xray with non-obstructive gas pattern, no free air.  - Constipation --- cont senna, bisacodyl, docusate; add'l agents PRN    #  - BPH   --- cont finasteride  --- clarify whether pt needs finasteride given that he is anuric    # Renal - ESRD on HD  --- C/w HD qMWF, as well as Saturday prn and add'l fluid-removal PRN  --- C/w Carrie-Tiffanie, Sevelamer  --- appreciate nephrology recs  --- may need removal of tunneled catheter and chronic PICC in the setting of sepsis.    # Endocr  - Diabetes mellitus type 2  --- off insulin for years  --- monitor glucose on BMP and blood gases  - Hypothyroidism  --- cont levothyroxine    # Infectious Disease   - Sepsis (suspected due to rectal fever, hi WBC, low BP): diff dx includes acute cholecystitis, pneumonia, diverticulitis/gut translocation, and/or CLABSI  --- cont vanc by level  --- cont pip/tazo, renally adjusted  --- f/u BCx (NGTD)  --- Procalcitonin elevated, but un-interpretable in setting of ESRD  --- appreciate ID recs  ------ per conversation w ID attdg Dr Mcqueen 6/7am, likeliest cause of sepsis continues to be acute cholecystitis without source control. Per ID, no rationale to broaden abx or add antifungal unless BCx become positive. Had HIDA been completed and been negative, next step would have been to repeat full-body CT  - HBV vaccination not up to date --- consider HBV vaccination this admission    # VTE ppx: warfarin held in the setting of supra therapeutic INR.   # Dispo: CCU  # GOC: full code, discussed multiple times this admission and in past, including with palliative care. During prior admission pt had two healthcare proxy forms filled out on same day (3/9/2018), one designating sister as primary, the other designating wife as primary. Sister and wife each say their form was the more recent; neither form includes time, only date. Wife's form is the one that was scanned into EMR suggesting but not proving that hers was more recent. Best way forward would appear to be to try to get wife and sister in agreement about medical decision-making.

## 2018-06-07 NOTE — PROGRESS NOTE ADULT - SUBJECTIVE AND OBJECTIVE BOX
Note Incomplete  --- Pending Attending Rounds    Briefly: 63yM HFrEF (chronic  gtt), Pulm Fibrosis, ESRD (HD), Afib (warfarin), p/w nosebleed, admitted for SOB, course c/b sepsis    Interval Events: Went to HIDA scan and was injected w tracer but could not tolerate study so it was not completed. Underwent HD (net neg 4L). Did not sleep overnight; mostly wore BiLevel.     Subjective:     Physical:  ICU Vital Signs Last 24 Hrs  T(C): 36.4 (2018 01:23), Max: 37.7 (2018 16:00)  T(F): 97.5 (2018 01:23), Max: 99.9 (2018 16:00)  HR: 117 (2018 06:00) (97 - 123)  BP: 103/51 (2018 06:00) (90/49 - 120/78)  BP(mean): 63 (2018 06:00) (57 - 96)  ABP: 89/50 (2018 06:00) (66/42 - 105/63)  ABP(mean): 62 (2018 06:00) (49 - 77)  RR: 23 (2018 06:00) (8 - 36)  SpO2: 97% (2018 06:00) (64% - 99%)      Telemetry: A-fib,  more or less, frequent NSVT up to 6 consecutive beats    EKG:    Exam:      LABS:  CAPILLARY BLOOD GLUCOSE      POCT Blood Glucose.: 104 mg/dL (2018 01:37)  POCT Blood Glucose.: 219 mg/dL (2018 17:30)  POCT Blood Glucose.: 297 mg/dL (2018 12:15)  POCT Blood Glucose.: 266 mg/dL (2018 09:17)    I&O's Summary    2018 07:01  -  2018 07:00  --------------------------------------------------------  IN: 1605.2 mL / OUT: 2000 mL / NET: -394.8 mL    2018 07:01  -  2018 06:42  --------------------------------------------------------  IN: 1322.2 mL / OUT: 4400 mL / NET: -3077.8 mL                              12.3   16.13 )-----------( 137      ( 2018 05:30 )             36.6     WBC Trend: 16.13<--, 14.42<--, 15.19<--  0607    131<L>  |  91<L>  |  23  ----------------------------<  118<H>  3.8   |  19<L>  |  3.90<H>    Ca    8.8      2018 05:30  Phos  2.7       Mg     1.9     07    TPro  8.4<H>  /  Alb  3.1<L>  /  TBili  2.6<H>  /  DBili  x   /  AST  60<H>  /  ALT  31  /  AlkPhos  487<H>      Creatinine Trend: 3.90<--, 6.09<--, 5.44<--, 4.58<--, 4.06<--, 6.49<--  PT/INR - ( 2018 05:30 )   PT: 97.5 SEC;   INR: 8.41          PTT - ( 2018 05:30 )  PTT:72.8 SEC          Imaging:        MEDICATIONS  (STANDING):  amiodarone    Tablet 100 milliGRAM(s) Oral daily  atorvastatin 80 milliGRAM(s) Oral at bedtime  bisacodyl 5 milliGRAM(s) Oral at bedtime  chlorhexidine 4% Liquid 1 Application(s) Topical <User Schedule>  dextrose 50% Injectable 12.5 Gram(s) IV Push once  dextrose 50% Injectable 25 Gram(s) IV Push once  dextrose 50% Injectable 25 Gram(s) IV Push once  DOBUTamine Infusion 7.5 MICROgram(s)/kG/Min (15.975 mL/Hr) IV Continuous <Continuous>  docusate sodium 100 milliGRAM(s) Oral two times a day  finasteride 5 milliGRAM(s) Oral daily  insulin lispro (HumaLOG) corrective regimen sliding scale   SubCutaneous three times a day before meals  insulin lispro (HumaLOG) corrective regimen sliding scale   SubCutaneous at bedtime  levothyroxine 75 MICROGram(s) Oral daily  midodrine 30 milliGRAM(s) Oral three times a day  norepinephrine Infusion 1.2 MICROgram(s)/kG/Min (79.875 mL/Hr) IV Continuous <Continuous>  pantoprazole    Tablet 40 milliGRAM(s) Oral before breakfast  piperacillin/tazobactam IVPB. 3.375 Gram(s) IV Intermittent every 12 hours  senna 2 Tablet(s) Oral at bedtime  sevelamer hydrochloride 1600 milliGRAM(s) Oral <User Schedule>  sodium chloride 0.65% Nasal 1 Spray(s) Both Nostrils four times a day  vasopressin Infusion 0.02 Unit(s)/Min (1.2 mL/Hr) IV Continuous <Continuous>    MEDICATIONS  (PRN):  ALBUTerol/ipratropium for Nebulization 3 milliLiter(s) Nebulizer every 6 hours PRN Shortness of Breath and/or Wheezing  Biotene Dry Mouth Oral Rinse 5 milliLiter(s) Swish and Spit four times a day PRN dry mouth  dextrose 40% Gel 15 Gram(s) Oral once PRN Blood Glucose LESS THAN 70 milliGRAM(s)/deciliter      Consult Recommendations: Note Incomplete  --- Pending Attending Rounds    Briefly: 63yM HFrEF (chronic  gtt), Pulm Fibrosis, ESRD (HD), Afib (warfarin), p/w nosebleed, admitted for SOB, course c/b sepsis    Interval Events: Went to HIDA scan and was injected w tracer but could not tolerate study so it was not completed. Underwent HD (net neg 4L). Did not sleep overnight; mostly wore BiLevel.     Subjective: Review of Symptoms limited by pt's very brief responses, sometimes no or unclear responses to questions on house-staff interview this AM  GEN denies fevers  RESP says breathing better  GI says that he has abdominal pain w defecation but not otherwise    Physical:  ICU Vital Signs Last 24 Hrs  T(C): 36.4 (2018 01:23), Max: 37.7 (2018 16:00)  T(F): 97.5 (2018 01:23), Max: 99.9 (2018 16:00)  HR: 117 (2018 06:00) (97 - 123)  BP: 103/51 (2018 06:00) (90/49 - 120/78)  BP(mean): 63 (2018 06:00) (57 - 96)  ABP: 89/50 (2018 06:00) (66/42 - 105/63)  ABP(mean): 62 (2018 06:00) (49 - 77)  RR: 23 (2018 06:00) (8 - 36)  SpO2: 97% (2018 06:00) (64% - 99%)      Telemetry: A-fib,  more or less, frequent NSVT up to 6 consecutive beats    EKG:    Exam:  GEN ill-appearing, on BiLevel, diaphoretic  RESP breath sounds clearer than on previous exam  NECK jvd difficult to assess while wearing BiLevel  CV irreg irreg, s1 s2  GI RUQ appears more tender than LUQ; pt denies tenderness; difficult to assess in setting of lethargy  EXT warm down to shins, feet cooler; LE pitting edema pretibially 1+    LABS:  CAPILLARY BLOOD GLUCOSE      POCT Blood Glucose.: 104 mg/dL (2018 01:37)  POCT Blood Glucose.: 219 mg/dL (2018 17:30)  POCT Blood Glucose.: 297 mg/dL (2018 12:15)  POCT Blood Glucose.: 266 mg/dL (2018 09:17)    I&O's Summary    2018 07:  -  2018 07:00  --------------------------------------------------------  IN: 1605.2 mL / OUT: 2000 mL / NET: -394.8 mL    2018 07:01  -  2018 06:42  --------------------------------------------------------  IN: 1322.2 mL / OUT: 4400 mL / NET: -3077.8 mL                              12.3   16.13 )-----------( 137      ( 2018 05:30 )             36.6     WBC Trend: 16.13<--, 14.42<--, 15.19<--  0607    131<L>  |  91<L>  |  23  ----------------------------<  118<H>  3.8   |  19<L>  |  3.90<H>    Ca    8.8      2018 05:30  Phos  2.7     07  Mg     1.9     07    TPro  8.4<H>  /  Alb  3.1<L>  /  TBili  2.6<H>  /  DBili  x   /  AST  60<H>  /  ALT  31  /  AlkPhos  487<H>      Creatinine Trend: 3.90<--, 6.09<--, 5.44<--, 4.58<--, 4.06<--, 6.49<--  PT/INR - ( 2018 05:30 )   PT: 97.5 SEC;   INR: 8.41          PTT - ( 2018 05:30 )  PTT:72.8 SEC          Imaging:        MEDICATIONS  (STANDING):  amiodarone    Tablet 100 milliGRAM(s) Oral daily  atorvastatin 80 milliGRAM(s) Oral at bedtime  bisacodyl 5 milliGRAM(s) Oral at bedtime  chlorhexidine 4% Liquid 1 Application(s) Topical <User Schedule>  dextrose 50% Injectable 12.5 Gram(s) IV Push once  dextrose 50% Injectable 25 Gram(s) IV Push once  dextrose 50% Injectable 25 Gram(s) IV Push once  DOBUTamine Infusion 7.5 MICROgram(s)/kG/Min (15.975 mL/Hr) IV Continuous <Continuous>  docusate sodium 100 milliGRAM(s) Oral two times a day  finasteride 5 milliGRAM(s) Oral daily  insulin lispro (HumaLOG) corrective regimen sliding scale   SubCutaneous three times a day before meals  insulin lispro (HumaLOG) corrective regimen sliding scale   SubCutaneous at bedtime  levothyroxine 75 MICROGram(s) Oral daily  midodrine 30 milliGRAM(s) Oral three times a day  norepinephrine Infusion 1.2 MICROgram(s)/kG/Min (79.875 mL/Hr) IV Continuous <Continuous>  pantoprazole    Tablet 40 milliGRAM(s) Oral before breakfast  piperacillin/tazobactam IVPB. 3.375 Gram(s) IV Intermittent every 12 hours  senna 2 Tablet(s) Oral at bedtime  sevelamer hydrochloride 1600 milliGRAM(s) Oral <User Schedule>  sodium chloride 0.65% Nasal 1 Spray(s) Both Nostrils four times a day  vasopressin Infusion 0.02 Unit(s)/Min (1.2 mL/Hr) IV Continuous <Continuous>    MEDICATIONS  (PRN):  ALBUTerol/ipratropium for Nebulization 3 milliLiter(s) Nebulizer every 6 hours PRN Shortness of Breath and/or Wheezing  Biotene Dry Mouth Oral Rinse 5 milliLiter(s) Swish and Spit four times a day PRN dry mouth  dextrose 40% Gel 15 Gram(s) Oral once PRN Blood Glucose LESS THAN 70 milliGRAM(s)/deciliter      Consult Recommendations: Briefly: 63yM HFrEF (chronic  gtt), Pulm Fibrosis, ESRD (HD), Afib (warfarin), p/w nosebleed, admitted for SOB, course c/b sepsis    Interval Events: Went to HIDA scan and was injected w tracer but could not tolerate study so it was not completed. Underwent HD (net neg 4L). Did not sleep overnight; mostly wore BiLevel.     Subjective: Review of Symptoms limited by pt's very brief responses, sometimes no or unclear responses to questions on house-staff interview this AM  GEN denies fevers  RESP says breathing better  GI says that he has abdominal pain w defecation but not otherwise    Physical:  ICU Vital Signs Last 24 Hrs  T(C): 36.4 (2018 01:23), Max: 37.7 (2018 16:00)  T(F): 97.5 (2018 01:23), Max: 99.9 (2018 16:00)  HR: 117 (2018 06:00) (97 - 123)  BP: 103/51 (2018 06:00) (90/49 - 120/78)  BP(mean): 63 (2018 06:00) (57 - 96)  ABP: 89/50 (2018 06:00) (66/42 - 105/63)  ABP(mean): 62 (2018 06:00) (49 - 77)  RR: 23 (2018 06:00) (8 - 36)  SpO2: 97% (2018 06:00) (64% - 99%)      Telemetry: A-fib,  more or less, frequent NSVT up to 6 consecutive beats    EKG: no EKG collected yet this AM    Exam:  GEN ill-appearing, on BiLevel, diaphoretic  RESP breath sounds clearer than on previous exam  NECK jvd difficult to assess while wearing BiLevel  CV irreg irreg, s1 s2  GI RUQ appears more tender than LUQ; pt denies tenderness; difficult to assess in setting of lethargy  EXT warm down to shins, feet cooler; LE pitting edema pretibially 1+    LABS:  CAPILLARY BLOOD GLUCOSE      POCT Blood Glucose.: 104 mg/dL (2018 01:37)  POCT Blood Glucose.: 219 mg/dL (2018 17:30)  POCT Blood Glucose.: 297 mg/dL (2018 12:15)  POCT Blood Glucose.: 266 mg/dL (2018 09:17)    I&O's Summary    2018 07:01  -  2018 07:00  --------------------------------------------------------  IN: 1605.2 mL / OUT: 2000 mL / NET: -394.8 mL    2018 07:01  -  2018 06:42  --------------------------------------------------------  IN: 1322.2 mL / OUT: 4400 mL / NET: -3077.8 mL                              12.3   16.13 )-----------( 137      ( 2018 05:30 )             36.6     WBC Trend: 16.13<--, 14.42<--, 15.19<--  0607    131<L>  |  91<L>  |  23  ----------------------------<  118<H>  3.8   |  19<L>  |  3.90<H>    Ca    8.8      2018 05:30  Phos  2.7     07  Mg     1.9     07    TPro  8.4<H>  /  Alb  3.1<L>  /  TBili  2.6<H>  /  DBili  x   /  AST  60<H>  /  ALT  31  /  AlkPhos  487<H>      Creatinine Trend: 3.90<--, 6.09<--, 5.44<--, 4.58<--, 4.06<--, 6.49<--  PT/INR - ( 2018 05:30 )   PT: 97.5 SEC;   INR: 8.41          PTT - ( 2018 05:30 )  PTT:72.8 SEC          Imaging:        MEDICATIONS  (STANDING):  amiodarone    Tablet 100 milliGRAM(s) Oral daily  atorvastatin 80 milliGRAM(s) Oral at bedtime  bisacodyl 5 milliGRAM(s) Oral at bedtime  chlorhexidine 4% Liquid 1 Application(s) Topical <User Schedule>  dextrose 50% Injectable 12.5 Gram(s) IV Push once  dextrose 50% Injectable 25 Gram(s) IV Push once  dextrose 50% Injectable 25 Gram(s) IV Push once  DOBUTamine Infusion 7.5 MICROgram(s)/kG/Min (15.975 mL/Hr) IV Continuous <Continuous>  docusate sodium 100 milliGRAM(s) Oral two times a day  finasteride 5 milliGRAM(s) Oral daily  insulin lispro (HumaLOG) corrective regimen sliding scale   SubCutaneous three times a day before meals  insulin lispro (HumaLOG) corrective regimen sliding scale   SubCutaneous at bedtime  levothyroxine 75 MICROGram(s) Oral daily  midodrine 30 milliGRAM(s) Oral three times a day  norepinephrine Infusion 1.2 MICROgram(s)/kG/Min (79.875 mL/Hr) IV Continuous <Continuous>  pantoprazole    Tablet 40 milliGRAM(s) Oral before breakfast  piperacillin/tazobactam IVPB. 3.375 Gram(s) IV Intermittent every 12 hours  senna 2 Tablet(s) Oral at bedtime  sevelamer hydrochloride 1600 milliGRAM(s) Oral <User Schedule>  sodium chloride 0.65% Nasal 1 Spray(s) Both Nostrils four times a day  vasopressin Infusion 0.02 Unit(s)/Min (1.2 mL/Hr) IV Continuous <Continuous>    MEDICATIONS  (PRN):  ALBUTerol/ipratropium for Nebulization 3 milliLiter(s) Nebulizer every 6 hours PRN Shortness of Breath and/or Wheezing  Biotene Dry Mouth Oral Rinse 5 milliLiter(s) Swish and Spit four times a day PRN dry mouth  dextrose 40% Gel 15 Gram(s) Oral once PRN Blood Glucose LESS THAN 70 milliGRAM(s)/deciliter      Consult Recommendations: Briefly: 63yM HFrEF (chronic  gtt), Pulm Fibrosis, ESRD (HD), Afib (warfarin), p/w nosebleed, admitted for SOB, course c/b sepsis    Interval Events: Went to HIDA scan and was injected w tracer but could not tolerate study so it was not completed. Underwent HD (net neg 4L). Did not sleep overnight; mostly wore BiLevel. Told overnight RN, cardiology attending Pam, and cardiology fellow Diana that he did not want percutaneous cholecystostomy drain, though patient is not currently able to explain his thought process in detail.     Subjective: Review of Symptoms limited by pt's very brief responses, sometimes no or unclear responses to questions on house-staff interview this AM  GEN denies fevers  RESP says breathing better  GI says that he has abdominal pain w defecation but not otherwise    Physical:  ICU Vital Signs Last 24 Hrs  T(C): 36.4 (2018 01:23), Max: 37.7 (2018 16:00)  T(F): 97.5 (2018 01:23), Max: 99.9 (2018 16:00)  HR: 117 (2018 06:00) (97 - 123)  BP: 103/51 (2018 06:00) (90/49 - 120/78)  BP(mean): 63 (2018 06:00) (57 - 96)  ABP: 89/50 (2018 06:00) (66/42 - 105/63)  ABP(mean): 62 (2018 06:00) (49 - 77)  RR: 23 (2018 06:00) (8 - 36)  SpO2: 97% (2018 06:00) (64% - 99%)      Telemetry: A-fib,  more or less, frequent NSVT up to 6 consecutive beats    EKG: no EKG collected yet this AM    Exam:  GEN ill-appearing, on BiLevel, diaphoretic  RESP breath sounds clearer than on previous exam  NECK jvd difficult to assess while wearing BiLevel  CV irreg irreg, s1 s2  GI RUQ appears more tender than LUQ; pt denies tenderness; difficult to assess in setting of lethargy  EXT warm down to shins, feet cooler; LE pitting edema pretibially 1+    LABS:  CAPILLARY BLOOD GLUCOSE      POCT Blood Glucose.: 104 mg/dL (2018 01:37)  POCT Blood Glucose.: 219 mg/dL (2018 17:30)  POCT Blood Glucose.: 297 mg/dL (2018 12:15)  POCT Blood Glucose.: 266 mg/dL (2018 09:17)    I&O's Summary    2018 07:01  -  2018 07:00  --------------------------------------------------------  IN: 1605.2 mL / OUT: 2000 mL / NET: -394.8 mL    2018 07:01  -  2018 06:42  --------------------------------------------------------  IN: 1322.2 mL / OUT: 4400 mL / NET: -3077.8 mL                              12.3   16.13 )-----------( 137      ( 2018 05:30 )             36.6     WBC Trend: 16.13<--, 14.42<--, 15.19<--  06-07    131<L>  |  91<L>  |  23  ----------------------------<  118<H>  3.8   |  19<L>  |  3.90<H>    Ca    8.8      2018 05:30  Phos  2.7     07  Mg     1.9     07    TPro  8.4<H>  /  Alb  3.1<L>  /  TBili  2.6<H>  /  DBili  x   /  AST  60<H>  /  ALT  31  /  AlkPhos  487<H>      Creatinine Trend: 3.90<--, 6.09<--, 5.44<--, 4.58<--, 4.06<--, 6.49<--  PT/INR - ( 2018 05:30 )   PT: 97.5 SEC;   INR: 8.41          PTT - ( 2018 05:30 )  PTT:72.8 SEC          Imaging:        MEDICATIONS  (STANDING):  amiodarone    Tablet 100 milliGRAM(s) Oral daily  atorvastatin 80 milliGRAM(s) Oral at bedtime  bisacodyl 5 milliGRAM(s) Oral at bedtime  chlorhexidine 4% Liquid 1 Application(s) Topical <User Schedule>  dextrose 50% Injectable 12.5 Gram(s) IV Push once  dextrose 50% Injectable 25 Gram(s) IV Push once  dextrose 50% Injectable 25 Gram(s) IV Push once  DOBUTamine Infusion 7.5 MICROgram(s)/kG/Min (15.975 mL/Hr) IV Continuous <Continuous>  docusate sodium 100 milliGRAM(s) Oral two times a day  finasteride 5 milliGRAM(s) Oral daily  insulin lispro (HumaLOG) corrective regimen sliding scale   SubCutaneous three times a day before meals  insulin lispro (HumaLOG) corrective regimen sliding scale   SubCutaneous at bedtime  levothyroxine 75 MICROGram(s) Oral daily  midodrine 30 milliGRAM(s) Oral three times a day  norepinephrine Infusion 1.2 MICROgram(s)/kG/Min (79.875 mL/Hr) IV Continuous <Continuous>  pantoprazole    Tablet 40 milliGRAM(s) Oral before breakfast  piperacillin/tazobactam IVPB. 3.375 Gram(s) IV Intermittent every 12 hours  senna 2 Tablet(s) Oral at bedtime  sevelamer hydrochloride 1600 milliGRAM(s) Oral <User Schedule>  sodium chloride 0.65% Nasal 1 Spray(s) Both Nostrils four times a day  vasopressin Infusion 0.02 Unit(s)/Min (1.2 mL/Hr) IV Continuous <Continuous>    MEDICATIONS  (PRN):  ALBUTerol/ipratropium for Nebulization 3 milliLiter(s) Nebulizer every 6 hours PRN Shortness of Breath and/or Wheezing  Biotene Dry Mouth Oral Rinse 5 milliLiter(s) Swish and Spit four times a day PRN dry mouth  dextrose 40% Gel 15 Gram(s) Oral once PRN Blood Glucose LESS THAN 70 milliGRAM(s)/deciliter      Consult Recommendations:

## 2018-06-07 NOTE — PROGRESS NOTE ADULT - ASSESSMENT
63M severe CHF, on chronic dobutamine gtt at home x 3 years (follows up with Dr. Davis), AFib on coumadin, AICD, ESRD on HD MWF (plus add'l session on Saturday prn), COPD (on home O2), and pulmonary fibrosis, general surgery consulted today for acute cholecystitis.    -NPO  -continue abx  -would consult IR for STAT percutaneous cholecystostomy tube regardless of HIDA  -reverse INR prior to IR  -continued goals of care discussion  -page [phone] with questions

## 2018-06-07 NOTE — PROGRESS NOTE ADULT - SUBJECTIVE AND OBJECTIVE BOX
General Surgery Progress Note     S: HIDA scan attempted overnight but patient unable to tolerate. On BIPAP this am. 10 of vitamin K administered and INR increased this am from 7 to 8. WBC increasing to 16. Patient on .04 of vaso and 1 of levo. Endorses some improvement in pain this am     MEDICATIONS  (STANDING):  amiodarone    Tablet 100 milliGRAM(s) Oral daily  atorvastatin 80 milliGRAM(s) Oral at bedtime  bisacodyl 5 milliGRAM(s) Oral at bedtime  chlorhexidine 4% Liquid 1 Application(s) Topical <User Schedule>  dextrose 50% Injectable 12.5 Gram(s) IV Push once  dextrose 50% Injectable 25 Gram(s) IV Push once  dextrose 50% Injectable 25 Gram(s) IV Push once  DOBUTamine Infusion 7.5 MICROgram(s)/kG/Min (15.975 mL/Hr) IV Continuous <Continuous>  docusate sodium 100 milliGRAM(s) Oral two times a day  finasteride 5 milliGRAM(s) Oral daily  insulin lispro (HumaLOG) corrective regimen sliding scale   SubCutaneous three times a day before meals  insulin lispro (HumaLOG) corrective regimen sliding scale   SubCutaneous at bedtime  levothyroxine 75 MICROGram(s) Oral daily  midodrine 30 milliGRAM(s) Oral three times a day  norepinephrine Infusion 1.2 MICROgram(s)/kG/Min (79.875 mL/Hr) IV Continuous <Continuous>  pantoprazole    Tablet 40 milliGRAM(s) Oral before breakfast  piperacillin/tazobactam IVPB. 3.375 Gram(s) IV Intermittent every 12 hours  senna 2 Tablet(s) Oral at bedtime  sevelamer hydrochloride 1600 milliGRAM(s) Oral <User Schedule>  sodium chloride 0.65% Nasal 1 Spray(s) Both Nostrils four times a day  vasopressin Infusion 0.02 Unit(s)/Min (1.2 mL/Hr) IV Continuous <Continuous>    MEDICATIONS  (PRN):  ALBUTerol/ipratropium for Nebulization 3 milliLiter(s) Nebulizer every 6 hours PRN Shortness of Breath and/or Wheezing  Biotene Dry Mouth Oral Rinse 5 milliLiter(s) Swish and Spit four times a day PRN dry mouth  dextrose 40% Gel 15 Gram(s) Oral once PRN Blood Glucose LESS THAN 70 milliGRAM(s)/deciliter      Physical Exam:    Vital Signs Last 24 Hrs  T(C): 36.4 (07 Jun 2018 01:23), Max: 37.7 (06 Jun 2018 16:00)  T(F): 97.5 (07 Jun 2018 01:23), Max: 99.9 (06 Jun 2018 16:00)  HR: 117 (07 Jun 2018 06:00) (97 - 123)  BP: 103/51 (07 Jun 2018 06:00) (90/49 - 120/78)  BP(mean): 63 (07 Jun 2018 06:00) (57 - 96)  RR: 23 (07 Jun 2018 06:00) (8 - 36)  SpO2: 97% (07 Jun 2018 06:00) (64% - 99%)    06-06-18 @ 07:01  -  06-07-18 @ 07:00  --------------------------------------------------------  IN: 1322.2 mL / OUT: 4400 mL / NET: -3077.8 mL        Gen: NAD, on BIPAP      Abdominal: Soft, minimally distended, some involuntary guarding in the RUQ    LABS:                        12.3   16.13 )-----------( 137      ( 07 Jun 2018 05:30 )             36.6     06-07    131<L>  |  91<L>  |  23  ----------------------------<  118<H>  3.8   |  19<L>  |  3.90<H>    Ca    8.8      07 Jun 2018 05:30  Phos  2.7     06-07  Mg     1.9     06-07    TPro  8.4<H>  /  Alb  3.1<L>  /  TBili  2.6<H>  /  DBili  x   /  AST  60<H>  /  ALT  31  /  AlkPhos  487<H>  06-07

## 2018-06-07 NOTE — PROGRESS NOTE ADULT - PROBLEM SELECTOR PLAN 2
Plan as above. Continue dobutamine 7.5 mcg/kg/min.  Poor prognosis.   Appreciate palliative care team. Await family meeting.  Family to decide on percutaneous IR drain of possible acute anurag. HIDA scan was not complete due to pt intolerance.

## 2018-06-08 DIAGNOSIS — A41.9 SEPSIS, UNSPECIFIED ORGANISM: ICD-10-CM

## 2018-06-08 LAB
ALBUMIN SERPL ELPH-MCNC: 3.1 G/DL — LOW (ref 3.3–5)
ALP SERPL-CCNC: 641 U/L — HIGH (ref 40–120)
ALT FLD-CCNC: 41 U/L — SIGNIFICANT CHANGE UP (ref 4–41)
APTT BLD: 49.6 SEC — HIGH (ref 27.5–37.4)
APTT BLD: 51.3 SEC — HIGH (ref 27.5–37.4)
APTT BLD: 77.9 SEC — HIGH (ref 27.5–37.4)
AST SERPL-CCNC: 72 U/L — HIGH (ref 4–40)
BASE EXCESS BLDA CALC-SCNC: -3.9 MMOL/L — SIGNIFICANT CHANGE UP
BASE EXCESS BLDA CALC-SCNC: -9.3 MMOL/L — SIGNIFICANT CHANGE UP
BASOPHILS # BLD AUTO: 0.03 K/UL — SIGNIFICANT CHANGE UP (ref 0–0.2)
BASOPHILS NFR BLD AUTO: 0.2 % — SIGNIFICANT CHANGE UP (ref 0–2)
BILIRUB SERPL-MCNC: 3.5 MG/DL — HIGH (ref 0.2–1.2)
BUN SERPL-MCNC: 18 MG/DL — SIGNIFICANT CHANGE UP (ref 7–23)
BUN SERPL-MCNC: 37 MG/DL — HIGH (ref 7–23)
BUN SERPL-MCNC: 37 MG/DL — HIGH (ref 7–23)
CALCIUM SERPL-MCNC: 8.3 MG/DL — LOW (ref 8.4–10.5)
CALCIUM SERPL-MCNC: 8.6 MG/DL — SIGNIFICANT CHANGE UP (ref 8.4–10.5)
CALCIUM SERPL-MCNC: 8.6 MG/DL — SIGNIFICANT CHANGE UP (ref 8.4–10.5)
CHLORIDE BLDA-SCNC: 105 MMOL/L — SIGNIFICANT CHANGE UP (ref 96–108)
CHLORIDE BLDA-SCNC: 111 MMOL/L — HIGH (ref 96–108)
CHLORIDE SERPL-SCNC: 101 MMOL/L — SIGNIFICANT CHANGE UP (ref 98–107)
CHLORIDE SERPL-SCNC: 91 MMOL/L — LOW (ref 98–107)
CHLORIDE SERPL-SCNC: 91 MMOL/L — LOW (ref 98–107)
CO2 SERPL-SCNC: 15 MMOL/L — LOW (ref 22–31)
CO2 SERPL-SCNC: 15 MMOL/L — LOW (ref 22–31)
CO2 SERPL-SCNC: 19 MMOL/L — LOW (ref 22–31)
CREAT SERPL-MCNC: 2.69 MG/DL — HIGH (ref 0.5–1.3)
CREAT SERPL-MCNC: 5.09 MG/DL — HIGH (ref 0.5–1.3)
CREAT SERPL-MCNC: 5.09 MG/DL — HIGH (ref 0.5–1.3)
EOSINOPHIL # BLD AUTO: 0.13 K/UL — SIGNIFICANT CHANGE UP (ref 0–0.5)
EOSINOPHIL NFR BLD AUTO: 0.8 % — SIGNIFICANT CHANGE UP (ref 0–6)
GLUCOSE BLDA-MCNC: 127 MG/DL — HIGH (ref 70–99)
GLUCOSE BLDA-MCNC: 235 MG/DL — HIGH (ref 70–99)
GLUCOSE SERPL-MCNC: 127 MG/DL — HIGH (ref 70–99)
GLUCOSE SERPL-MCNC: 219 MG/DL — HIGH (ref 70–99)
GLUCOSE SERPL-MCNC: 219 MG/DL — HIGH (ref 70–99)
HCO3 BLDA-SCNC: 17 MMOL/L — LOW (ref 22–26)
HCO3 BLDA-SCNC: 21 MMOL/L — LOW (ref 22–26)
HCT VFR BLD CALC: 33.9 % — LOW (ref 39–50)
HCT VFR BLD CALC: 34.6 % — LOW (ref 39–50)
HCT VFR BLDA CALC: 34.5 % — LOW (ref 39–51)
HCT VFR BLDA CALC: 35.1 % — LOW (ref 39–51)
HGB BLD-MCNC: 10.9 G/DL — LOW (ref 13–17)
HGB BLD-MCNC: 11.1 G/DL — LOW (ref 13–17)
HGB BLDA-MCNC: 11.2 G/DL — LOW (ref 13–17)
HGB BLDA-MCNC: 11.4 G/DL — LOW (ref 13–17)
IMM GRANULOCYTES # BLD AUTO: 0.21 # — SIGNIFICANT CHANGE UP
IMM GRANULOCYTES NFR BLD AUTO: 1.2 % — SIGNIFICANT CHANGE UP (ref 0–1.5)
INR BLD: 2.13 — HIGH (ref 0.88–1.17)
INR BLD: 2.53 — HIGH (ref 0.88–1.17)
INR BLD: 7.68 — CRITICAL HIGH (ref 0.88–1.17)
LACTATE BLDA-SCNC: 2.1 MMOL/L — HIGH (ref 0.5–2)
LACTATE BLDA-SCNC: 2.9 MMOL/L — HIGH (ref 0.5–2)
LYMPHOCYTES # BLD AUTO: 0.82 K/UL — LOW (ref 1–3.3)
LYMPHOCYTES # BLD AUTO: 4.7 % — LOW (ref 13–44)
MAGNESIUM SERPL-MCNC: 2.1 MG/DL — SIGNIFICANT CHANGE UP (ref 1.6–2.6)
MAGNESIUM SERPL-MCNC: 2.2 MG/DL — SIGNIFICANT CHANGE UP (ref 1.6–2.6)
MAGNESIUM SERPL-MCNC: 2.2 MG/DL — SIGNIFICANT CHANGE UP (ref 1.6–2.6)
MCHC RBC-ENTMCNC: 30.3 PG — SIGNIFICANT CHANGE UP (ref 27–34)
MCHC RBC-ENTMCNC: 30.6 PG — SIGNIFICANT CHANGE UP (ref 27–34)
MCHC RBC-ENTMCNC: 31.5 % — LOW (ref 32–36)
MCHC RBC-ENTMCNC: 32.7 % — SIGNIFICANT CHANGE UP (ref 32–36)
MCV RBC AUTO: 92.6 FL — SIGNIFICANT CHANGE UP (ref 80–100)
MCV RBC AUTO: 97.2 FL — SIGNIFICANT CHANGE UP (ref 80–100)
MONOCYTES # BLD AUTO: 1.93 K/UL — HIGH (ref 0–0.9)
MONOCYTES NFR BLD AUTO: 11.2 % — SIGNIFICANT CHANGE UP (ref 2–14)
NEUTROPHILS # BLD AUTO: 14.16 K/UL — HIGH (ref 1.8–7.4)
NEUTROPHILS NFR BLD AUTO: 81.9 % — HIGH (ref 43–77)
NRBC # FLD: 0 — SIGNIFICANT CHANGE UP
NRBC # FLD: 0 — SIGNIFICANT CHANGE UP
PCO2 BLDA: 41 MMHG — SIGNIFICANT CHANGE UP (ref 35–48)
PCO2 BLDA: 45 MMHG — SIGNIFICANT CHANGE UP (ref 35–48)
PH BLDA: 7.24 PH — LOW (ref 7.35–7.45)
PH BLDA: 7.3 PH — LOW (ref 7.35–7.45)
PHOSPHATE SERPL-MCNC: 2.8 MG/DL — SIGNIFICANT CHANGE UP (ref 2.5–4.5)
PHOSPHATE SERPL-MCNC: 4.4 MG/DL — SIGNIFICANT CHANGE UP (ref 2.5–4.5)
PHOSPHATE SERPL-MCNC: 4.4 MG/DL — SIGNIFICANT CHANGE UP (ref 2.5–4.5)
PLATELET # BLD AUTO: 146 K/UL — LOW (ref 150–400)
PLATELET # BLD AUTO: 148 K/UL — LOW (ref 150–400)
PMV BLD: 11.5 FL — SIGNIFICANT CHANGE UP (ref 7–13)
PMV BLD: 12 FL — SIGNIFICANT CHANGE UP (ref 7–13)
PO2 BLDA: 71 MMHG — LOW (ref 83–108)
PO2 BLDA: 91 MMHG — SIGNIFICANT CHANGE UP (ref 83–108)
POTASSIUM BLDA-SCNC: 3.5 MMOL/L — SIGNIFICANT CHANGE UP (ref 3.4–4.5)
POTASSIUM BLDA-SCNC: 4.4 MMOL/L — SIGNIFICANT CHANGE UP (ref 3.4–4.5)
POTASSIUM SERPL-MCNC: 3.8 MMOL/L — SIGNIFICANT CHANGE UP (ref 3.5–5.3)
POTASSIUM SERPL-MCNC: 4.6 MMOL/L — SIGNIFICANT CHANGE UP (ref 3.5–5.3)
POTASSIUM SERPL-MCNC: 4.6 MMOL/L — SIGNIFICANT CHANGE UP (ref 3.5–5.3)
POTASSIUM SERPL-SCNC: 3.8 MMOL/L — SIGNIFICANT CHANGE UP (ref 3.5–5.3)
POTASSIUM SERPL-SCNC: 4.6 MMOL/L — SIGNIFICANT CHANGE UP (ref 3.5–5.3)
POTASSIUM SERPL-SCNC: 4.6 MMOL/L — SIGNIFICANT CHANGE UP (ref 3.5–5.3)
PROT SERPL-MCNC: 8 G/DL — SIGNIFICANT CHANGE UP (ref 6–8.3)
PROTHROM AB SERPL-ACNC: 24.9 SEC — HIGH (ref 9.8–13.1)
PROTHROM AB SERPL-ACNC: 28.6 SEC — HIGH (ref 9.8–13.1)
PROTHROM AB SERPL-ACNC: 92.1 SEC — HIGH (ref 9.8–13.1)
RBC # BLD: 3.56 M/UL — LOW (ref 4.2–5.8)
RBC # BLD: 3.66 M/UL — LOW (ref 4.2–5.8)
RBC # FLD: 15.1 % — HIGH (ref 10.3–14.5)
RBC # FLD: 15.2 % — HIGH (ref 10.3–14.5)
REVIEW TO FOLLOW: YES — SIGNIFICANT CHANGE UP
SAO2 % BLDA: 91.7 % — LOW (ref 95–99)
SAO2 % BLDA: 94.8 % — LOW (ref 95–99)
SODIUM BLDA-SCNC: 128 MMOL/L — LOW (ref 136–146)
SODIUM BLDA-SCNC: 138 MMOL/L — SIGNIFICANT CHANGE UP (ref 136–146)
SODIUM SERPL-SCNC: 131 MMOL/L — LOW (ref 135–145)
SODIUM SERPL-SCNC: 131 MMOL/L — LOW (ref 135–145)
SODIUM SERPL-SCNC: 139 MMOL/L — SIGNIFICANT CHANGE UP (ref 135–145)
VANCOMYCIN FLD-MCNC: 27.1 UG/ML — CRITICAL HIGH
WBC # BLD: 17.28 K/UL — HIGH (ref 3.8–10.5)
WBC # BLD: 17.63 K/UL — HIGH (ref 3.8–10.5)
WBC # FLD AUTO: 17.28 K/UL — HIGH (ref 3.8–10.5)
WBC # FLD AUTO: 17.63 K/UL — HIGH (ref 3.8–10.5)

## 2018-06-08 PROCEDURE — 99232 SBSQ HOSP IP/OBS MODERATE 35: CPT

## 2018-06-08 PROCEDURE — 99233 SBSQ HOSP IP/OBS HIGH 50: CPT

## 2018-06-08 RX ORDER — PROTHROMBIN COMPLEX CONCENTRATE (HUMAN) 25.5; 16.5; 24; 22; 22; 26 [IU]/ML; [IU]/ML; [IU]/ML; [IU]/ML; [IU]/ML; [IU]/ML
1000 POWDER, FOR SOLUTION INTRAVENOUS ONCE
Qty: 0 | Refills: 0 | Status: DISCONTINUED | OUTPATIENT
Start: 2018-06-08 | End: 2018-06-08

## 2018-06-08 RX ORDER — PROTHROMBIN COMPLEX CONCENTRATE (HUMAN) 25.5; 16.5; 24; 22; 22; 26 [IU]/ML; [IU]/ML; [IU]/ML; [IU]/ML; [IU]/ML; [IU]/ML
500 POWDER, FOR SOLUTION INTRAVENOUS ONCE
Qty: 0 | Refills: 0 | Status: COMPLETED | OUTPATIENT
Start: 2018-06-08 | End: 2018-06-08

## 2018-06-08 RX ORDER — PHENYLEPHRINE HYDROCHLORIDE 10 MG/ML
0.5 INJECTION INTRAVENOUS
Qty: 160 | Refills: 0 | Status: DISCONTINUED | OUTPATIENT
Start: 2018-06-08 | End: 2018-06-09

## 2018-06-08 RX ORDER — PHYTONADIONE (VIT K1) 5 MG
10 TABLET ORAL ONCE
Qty: 0 | Refills: 0 | Status: DISCONTINUED | OUTPATIENT
Start: 2018-06-08 | End: 2018-06-08

## 2018-06-08 RX ORDER — PROTHROMBIN COMPLEX CONCENTRATE (HUMAN) 25.5; 16.5; 24; 22; 22; 26 [IU]/ML; [IU]/ML; [IU]/ML; [IU]/ML; [IU]/ML; [IU]/ML
1500 POWDER, FOR SOLUTION INTRAVENOUS ONCE
Qty: 0 | Refills: 0 | Status: COMPLETED | OUTPATIENT
Start: 2018-06-08 | End: 2018-06-08

## 2018-06-08 RX ORDER — PHYTONADIONE (VIT K1) 5 MG
5 TABLET ORAL ONCE
Qty: 0 | Refills: 0 | Status: COMPLETED | OUTPATIENT
Start: 2018-06-08 | End: 2018-06-08

## 2018-06-08 RX ORDER — PHYTONADIONE (VIT K1) 5 MG
2.5 TABLET ORAL ONCE
Qty: 0 | Refills: 0 | Status: DISCONTINUED | OUTPATIENT
Start: 2018-06-08 | End: 2018-06-08

## 2018-06-08 RX ORDER — PHYTONADIONE (VIT K1) 5 MG
5 TABLET ORAL ONCE
Qty: 0 | Refills: 0 | Status: DISCONTINUED | OUTPATIENT
Start: 2018-06-08 | End: 2018-06-08

## 2018-06-08 RX ADMIN — Medication 101 MILLIGRAM(S): at 15:13

## 2018-06-08 RX ADMIN — PANTOPRAZOLE SODIUM 40 MILLIGRAM(S): 20 TABLET, DELAYED RELEASE ORAL at 06:08

## 2018-06-08 RX ADMIN — Medication 79.88 MICROGRAM(S)/KG/MIN: at 12:00

## 2018-06-08 RX ADMIN — CHLORHEXIDINE GLUCONATE 1 APPLICATION(S): 213 SOLUTION TOPICAL at 13:31

## 2018-06-08 RX ADMIN — Medication 5 MILLIGRAM(S): at 22:09

## 2018-06-08 RX ADMIN — MIDODRINE HYDROCHLORIDE 30 MILLIGRAM(S): 2.5 TABLET ORAL at 22:10

## 2018-06-08 RX ADMIN — Medication 15.97 MICROGRAM(S)/KG/MIN: at 03:15

## 2018-06-08 RX ADMIN — FINASTERIDE 5 MILLIGRAM(S): 5 TABLET, FILM COATED ORAL at 13:45

## 2018-06-08 RX ADMIN — PIPERACILLIN AND TAZOBACTAM 25 GRAM(S): 4; .5 INJECTION, POWDER, LYOPHILIZED, FOR SOLUTION INTRAVENOUS at 18:26

## 2018-06-08 RX ADMIN — Medication 1 SPRAY(S): at 18:26

## 2018-06-08 RX ADMIN — ATORVASTATIN CALCIUM 80 MILLIGRAM(S): 80 TABLET, FILM COATED ORAL at 22:10

## 2018-06-08 RX ADMIN — Medication 100 MILLIGRAM(S): at 05:22

## 2018-06-08 RX ADMIN — MIDODRINE HYDROCHLORIDE 30 MILLIGRAM(S): 2.5 TABLET ORAL at 05:21

## 2018-06-08 RX ADMIN — Medication 1 SPRAY(S): at 05:22

## 2018-06-08 RX ADMIN — Medication 75 MICROGRAM(S): at 05:22

## 2018-06-08 RX ADMIN — MIDODRINE HYDROCHLORIDE 30 MILLIGRAM(S): 2.5 TABLET ORAL at 13:45

## 2018-06-08 RX ADMIN — Medication 79.88 MICROGRAM(S)/KG/MIN: at 06:42

## 2018-06-08 RX ADMIN — PHENYLEPHRINE HYDROCHLORIDE 6.66 MICROGRAM(S)/KG/MIN: 10 INJECTION INTRAVENOUS at 08:45

## 2018-06-08 RX ADMIN — PROTHROMBIN COMPLEX CONCENTRATE (HUMAN) 400 INTERNATIONAL UNIT(S): 25.5; 16.5; 24; 22; 22; 26 POWDER, FOR SOLUTION INTRAVENOUS at 16:01

## 2018-06-08 RX ADMIN — AMIODARONE HYDROCHLORIDE 100 MILLIGRAM(S): 400 TABLET ORAL at 05:21

## 2018-06-08 RX ADMIN — VASOPRESSIN 2.4 UNIT(S)/MIN: 20 INJECTION INTRAVENOUS at 01:26

## 2018-06-08 RX ADMIN — Medication 1: at 08:54

## 2018-06-08 RX ADMIN — SENNA PLUS 2 TABLET(S): 8.6 TABLET ORAL at 22:09

## 2018-06-08 RX ADMIN — PROTHROMBIN COMPLEX CONCENTRATE (HUMAN) 500 INTERNATIONAL UNIT(S): 25.5; 16.5; 24; 22; 22; 26 POWDER, FOR SOLUTION INTRAVENOUS at 18:31

## 2018-06-08 RX ADMIN — PIPERACILLIN AND TAZOBACTAM 25 GRAM(S): 4; .5 INJECTION, POWDER, LYOPHILIZED, FOR SOLUTION INTRAVENOUS at 05:22

## 2018-06-08 NOTE — PROGRESS NOTE ADULT - ASSESSMENT
63yM w severe HFrEF (on dobutamine gtt x3 yrs) w [employer] AICD, A-fib (on warfarin), ESRD on HD MWF (+Sat PRN), pulmonary fibrosis (on home O2), on  p/w epistaxis x1 day, also w acute-on-chronic SOB, found to be volume-overloaded despite reported med compliance, no dietary changes, and having gotten extra session of HD the day prior to admission. SBP 80s-90s in UEs, so in order to be monitored while undergoing fluid removal by HD/UF, was transferred to CCU.  downtritrated as low as 2.5 w guidance from hemodynamics ([place]-Chris in place -).     Course c/b abdominal pain and nausea, rising WBC, rectal temp >101, hypotension; BCx neg to date. Diff Dx for sepsis includes cholecystitis (favored by R-sided pain+tenderness and CT A/P, disfavored by LFTs wnl, US RUQ w sonographic Altamirano's negative), gut translocation of GI shelley (favored by R-sided pain), pneumonia (favored by worsening CT chest and by worsening resp status), and/or central-line-associated bloodstream infection (favored by temporal association w presence of R femoral line).     Pressor requirements stably high, pt very ill-appearing and lethargic today. Best guess of cause of apparent sepsis is acute cholecystitis, per assessment of primary team as well as recs of ID and surgery. However, given his inability to tolerate HIDA 6/6 PM and his vocalized refusal of procedure today, pt seems unlikely to tolerate perc anurag without sedation which in turn would require intubation, from which patient seems unlikely to be able to wean.      # Neuro - lethargy in setting of suspected sepsis --- attempt to treat underlying cause    # ENT - epistaxis, resolved; attachment for home humidification of nasal cannula sent to patient's home  --- continue humidification of nasal cannula, nasal saline spray  --- facilitate patient's getting simple mask to use for supplemental O2 at home    # Cardiac  - Severe bi-ventricular systolic heart failure w AICD in place, on chronic dobutamine gtt 7.5, LVEF 15% this admission.    --- Cont dobutamine 7.5  --- appreciate Heart-Failure recs  --- daily weights: standing weights if possible  --- out of bed to chair, increase activity as tolerated  - Hypotension in setting of sepsis  --- Cont home midodrine 30 TID  --- cont norepinephrine gtt and vasopressin 0.04, titrate to MAP 65-70  -added mily today   - Atrial fibrillation, on home warfarin 3mg QD  --- cont amiodarone 100mg QD  --- check INR QD  ------- if decision is to pursue perc anurag, would require reversal with Prothrombin Complex Concentrate and possibly add'l Vit K   --- cont newly started PPI QD for GI protection while INR supratherapeutic  - Hyperlipidemia --- cont atorvastatin 80 daily  - BP discrepancy between UEs vs LEs  --- BP in lower extremities is significantly higher than in upper extremities, consistent w prior CCU admission; we think that LE BP is likely more accurate than UE BP. F/u [facility] duplex of UEs did not reveal any significant arterial stenosis.   ---- RUE arterial line placed   - RIJ non-occlusive clot vs fibrin sheath associated w Perma-cath, seen on CT 6/3 --- monitor clinically  - Splenic infarct seen on CT 6/3, potentially to atrial fibrillation and recently sub-therapeutic INR --- supportive care, monitor clinically  - MACE risk reduction --- cont ASA    # Pulm  - Hypoxia, likely multifactorial --- c/w nasal cannula during day, BiLevel at night and PRN  - Pulmonary fibrosis  --- per pulm, do not anticipate benefit from steroids based on unsuccessful trial in past  - ALONZO --- BiLevel at night and PRN, patient now compliant, not hypercarbic on recent ABGs even when lethargic and off BiLevel  - Pleural effusion, R-sided --- increased from prior CT, now small-to-moderate    # GI  - Probable cholecystitis -- Abdominal pain, R-sided, achy, associated w tenderness to palpation, CT A/P c/f cholecystitis, US RUQ: Sonographic Altamirano's sign negative  --- pt was unable to tolerate HIDA on   --- appreciate surgery recs: not a surgical candidate  --- appreciate IR recs  -------- still planned for IR-guided percutaneous cholecystostomy tube placement , pending INR <2  --- order simethicone if needed for gas  --- order acetaminophen if needed for pain  --- avoiding Maalox and other aluminum-hydroxide-containing products due to ESRD   - Nausea and vomiting  --- order ondansetron and/or metoclopramide if needed   --- abd xray with non-obstructive gas pattern, no free air.  - Constipation --- cont senna, bisacodyl, docusate; add'l agents PRN    #  - BPH   --- cont finasteride  --- clarify whether pt needs finasteride given that he is anuric    # Renal - ESRD on HD  --- C/w HD qMWF, as well as Saturday prn and add'l fluid-removal PRN  --- C/w Carrie-Tiffanie, Sevelamer  --- appreciate nephrology recs  --- may need removal of tunneled catheter and chronic PICC in the setting of sepsis.    # Endocr  - Diabetes mellitus type 2  --- off insulin for years  --- monitor glucose on BMP and blood gases  - Hypothyroidism  --- cont levothyroxine    # Infectious Disease   - Sepsis (suspected due to rectal fever, hi WBC, low BP): diff dx includes acute cholecystitis, pneumonia, diverticulitis/gut translocation, and/or CLABSI  --- cont vanc by level  --- cont pip/tazo, renally adjusted  --- f/u BCx (NGTD)  --- Procalcitonin elevated, but un-interpretable in setting of ESRD  --- appreciate ID recs  ------ per conversation w ID attdg Dr Mcqueen 6/7am, likeliest cause of sepsis continues to be acute cholecystitis without source control. Per ID, no rationale to broaden abx or add antifungal unless BCx become positive. Had HIDA been completed and been negative, next step would have been to repeat full-body CT  - HBV vaccination not up to date --- consider HBV vaccination this admission    # VTE ppx: warfarin held in the setting of supra therapeutic INR.   # Dispo: CCU  # GOC: full code, discussed multiple times this admission and in past, including with palliative care. During prior admission pt had two healthcare proxy forms filled out on same day (3/9/2018), one designating sister as primary, the other designating wife as primary. Sister and wife each say their form was the more recent; neither form includes time, only date. Wife's form is the one that was scanned into EMR suggesting but not proving that hers was more recent. Best way forward would appear to be to try to get wife and sister in agreement about medical decision-making. 63yM w severe HFrEF (on dobutamine gtt x3 yrs) w [employer] AICD, A-fib (on warfarin), ESRD on HD MWF (+Sat PRN), pulmonary fibrosis (on home O2), on  p/w epistaxis x1 day, also w acute-on-chronic SOB, found to be volume-overloaded despite reported med compliance, no dietary changes, and having gotten extra session of HD the day prior to admission. SBP 80s-90s in UEs, so in order to be monitored while undergoing fluid removal by HD/UF, was transferred to CCU.  downtritrated as low as 2.5 w guidance from hemodynamics ([place]-Chris in place -).     Course c/b abdominal pain and nausea, rising WBC, rectal temp >101, hypotension; BCx neg to date. Diff Dx for sepsis includes cholecystitis (favored by R-sided pain+tenderness and CT A/P, disfavored by LFTs wnl, US RUQ w sonographic Altamirano's negative), gut translocation of GI shelley (favored by R-sided pain), pneumonia (favored by worsening CT chest and by worsening resp status), and/or central-line-associated bloodstream infection (favored by temporal association w presence of R femoral line).     Pressor requirements stably high, pt very ill-appearing and lethargic today. Best guess of cause of apparent sepsis is acute cholecystitis, per assessment of primary team as well as recs of ID and surgery. However, given his inability to tolerate HIDA 6/6 PM and his vocalized refusal of procedure today, pt seems unlikely to tolerate perc anurag without sedation which in turn would require intubation, from which patient seems unlikely to be able to wean.      # Neuro - lethargy in setting of suspected sepsis --- attempt to treat underlying cause    # ENT - epistaxis, resolved; attachment for home humidification of nasal cannula sent to patient's home  --- continue humidification of nasal cannula, nasal saline spray  --- facilitate patient's getting simple mask to use for supplemental O2 at home    # Cardiac  - Severe bi-ventricular systolic heart failure w AICD in place, on chronic dobutamine gtt 7.5, LVEF 15% this admission.    --- Cont dobutamine 7.5  --- appreciate Heart-Failure recs  --- daily weights: standing weights if possible  --- out of bed to chair, increase activity as tolerated  - Hypotension in setting of sepsis  --- Cont home midodrine 30 TID  --- cont norepinephrine gtt and vasopressin 0.04, titrate to MAP 65-70  -added mily today   - Atrial fibrillation, on home warfarin 3mg QD  --- cont amiodarone 100mg QD  --- check INR QD  ------- if decision is to pursue perc anurag, would require reversal with Prothrombin Complex Concentrate and possibly add'l Vit K   --- cont newly started PPI QD for GI protection while INR supratherapeutic  - Hyperlipidemia --- cont atorvastatin 80 daily  - BP discrepancy between UEs vs LEs  --- BP in lower extremities is significantly higher than in upper extremities, consistent w prior CCU admission; we think that LE BP is likely more accurate than UE BP. F/u [facility] duplex of UEs did not reveal any significant arterial stenosis.   ---- RUE arterial line placed   - RIJ non-occlusive clot vs fibrin sheath associated w Perma-cath, seen on CT 6/3 --- monitor clinically  - Splenic infarct seen on CT 6/3, potentially to atrial fibrillation and recently sub-therapeutic INR --- supportive care, monitor clinically  - MACE risk reduction --- cont ASA    # Pulm  - Hypoxia, likely multifactorial --- c/w nasal cannula during day, BiLevel at night and PRN  - Pulmonary fibrosis  --- per pulm, do not anticipate benefit from steroids based on unsuccessful trial in past  - ALONZO --- BiLevel at night and PRN, patient now compliant, not hypercarbic on recent ABGs even when lethargic and off BiLevel  - Pleural effusion, R-sided --- increased from prior CT, now small-to-moderate    # GI  - Probable cholecystitis -- Abdominal pain, R-sided, achy, associated w tenderness to palpation, CT A/P c/f cholecystitis, US RUQ: Sonographic Altamirano's sign negative  --- pt was unable to tolerate HIDA on   --- appreciate surgery recs: not a surgical candidate  --- appreciate IR recs  -------- still planned for IR-guided percutaneous cholecystostomy tube placement , pending INR <2  --- order simethicone if needed for gas  --- order acetaminophen if needed for pain  --- avoiding Maalox and other aluminum-hydroxide-containing products due to ESRD   - Nausea and vomiting  --- order ondansetron and/or metoclopramide if needed   --- abd xray with non-obstructive gas pattern, no free air.  - Constipation --- cont senna, bisacodyl, docusate; add'l agents PRN    #  - BPH   --- cont finasteride  --- clarify whether pt needs finasteride given that he is anuric    # Renal - ESRD on HD  --- C/w HD qMWF, as well as Saturday prn and add'l fluid-removal PRN  --- C/w Carrie-Tiffanie, Sevelamer  --- appreciate nephrology recs  --- may need removal of tunneled catheter and chronic PICC in the setting of sepsis.    # Endocr  - Diabetes mellitus type 2  --- off insulin for years  --- monitor glucose on BMP and blood gases  - Hypothyroidism  --- cont levothyroxine    # Infectious Disease   - Sepsis (suspected due to rectal fever, hi WBC, low BP): diff dx includes acute cholecystitis, pneumonia, diverticulitis/gut translocation, and/or CLABSI  --- cont vanc by level  --- cont pip/tazo, renally adjusted  --- f/u BCx (NGTD)  --- Procalcitonin elevated, but un-interpretable in setting of ESRD  --- appreciate ID recs  ---Issue is source, control, patient needs IR perc anurag  - HBV vaccination not up to date --- consider HBV vaccination this admission    # VTE ppx: warfarin held in the setting of supra therapeutic INR.   # Dispo: CCU  # GOC: full code, discussed multiple times this admission and in past, including with palliative care. During prior admission pt had two healthcare proxy forms filled out on same day (3/9/2018), one designating sister as primary, the other designating wife as primary. Sister and wife each say their form was the more recent; neither form includes time, only date. Wife's form is the one that was scanned into EMR suggesting but not proving that hers was more recent. Best way forward would appear to be to try to get wife and sister in agreement about medical decision-making. 63yM w severe HFrEF (on dobutamine gtt x3 yrs) w [employer] AICD, A-fib (on warfarin), ESRD on HD MWF (+Sat PRN), pulmonary fibrosis (on home O2), on  p/w epistaxis x1 day, also w acute-on-chronic SOB, found to be volume-overloaded despite reported med compliance, no dietary changes, and having gotten extra session of HD the day prior to admission. SBP 80s-90s in UEs, so in order to be monitored while undergoing fluid removal by HD/UF, was transferred to CCU.  downtritrated as low as 2.5 w guidance from hemodynamics ([place]-Chris in place -).     Course c/b abdominal pain and nausea, rising WBC, rectal temp >101, hypotension; BCx neg to date. Diff Dx for sepsis includes cholecystitis (favored by R-sided pain+tenderness and CT A/P, disfavored by LFTs wnl, US RUQ w sonographic Altamirano's negative), gut translocation of GI shelley (favored by R-sided pain), pneumonia (favored by worsening CT chest and by worsening resp status).    Pressor requirements stably high, pt very ill-appearing and lethargic today. Best guess of cause of apparent sepsis is acute cholecystitis, per assessment of primary team as well as recs of ID and surgery. However, given his inability to tolerate HIDA 6/6 PM and his vocalized refusal of procedure today, pt seems unlikely to tolerate perc anurag without sedation which in turn would require intubation, from which patient seems unlikely to be able to wean.      # Neuro - lethargy in setting of suspected sepsis --- attempt to treat underlying cause    # ENT - epistaxis, resolved; attachment for home humidification of nasal cannula sent to patient's home  --- continue humidification of nasal cannula, nasal saline spray  --- facilitate patient's getting simple mask to use for supplemental O2 at home    # Cardiac  - Severe bi-ventricular systolic heart failure w AICD in place, on chronic dobutamine gtt 7.5, LVEF 15% this admission.    --- Cont dobutamine 7.5  --- appreciate Heart-Failure recs  --- daily weights: standing weights if possible  --- out of bed to chair, increase activity as tolerated  - Hypotension in setting of sepsis  --- Cont home midodrine 30 TID  --- cont norepinephrine gtt and vasopressin 0.04, titrate to MAP 65-70  -added mily today   - Atrial fibrillation, on home warfarin 3mg QD  --- cont amiodarone 100mg QD  --- check INR QD  ------- if decision is to pursue perc anurag, would require reversal with Prothrombin Complex Concentrate and possibly add'l Vit K   --- cont newly started PPI QD for GI protection while INR supratherapeutic  - Hyperlipidemia --- cont atorvastatin 80 daily  - BP discrepancy between UEs vs LEs  --- BP in lower extremities is significantly higher than in upper extremities, consistent w prior CCU admission; we think that LE BP is likely more accurate than UE BP. F/u [facility] duplex of UEs did not reveal any significant arterial stenosis.   ---- RUE arterial line placed   - RIJ non-occlusive clot vs fibrin sheath associated w Perma-cath, seen on CT 6/3 --- monitor clinically  - Splenic infarct seen on CT 6/3, potentially to atrial fibrillation and recently sub-therapeutic INR --- supportive care, monitor clinically  - MACE risk reduction --- cont ASA    # Pulm  - Hypoxia, likely multifactorial --- c/w nasal cannula during day, BiLevel at night and PRN  - Pulmonary fibrosis  --- per pulm, do not anticipate benefit from steroids based on unsuccessful trial in past  - ALONZO --- BiLevel at night and PRN, patient now compliant, not hypercarbic on recent ABGs even when lethargic and off BiLevel  - Pleural effusion, R-sided --- increased from prior CT, now small-to-moderate    # GI  - Probable cholecystitis -- Abdominal pain, R-sided, achy, associated w tenderness to palpation, CT A/P c/f cholecystitis, US RUQ: Sonographic Altamirano's sign negative  --- pt was unable to tolerate HIDA on   --- appreciate surgery recs: not a surgical candidate  --- appreciate IR recs  -------- still planned for IR-guided percutaneous cholecystostomy tube placement , pending INR <2  --- order simethicone if needed for gas  --- order acetaminophen if needed for pain  --- avoiding Maalox and other aluminum-hydroxide-containing products due to ESRD   - Nausea and vomiting  --- order ondansetron and/or metoclopramide if needed   --- abd xray with non-obstructive gas pattern, no free air.  - Constipation --- cont senna, bisacodyl, docusate; add'l agents PRN    #  - BPH   --- cont finasteride  --- clarify whether pt needs finasteride given that he is anuric    # Renal - ESRD on HD  --- C/w HD qMWF, as well as Saturday prn and add'l fluid-removal PRN  --- C/w Carrie-Tiffanie, Sevelamer  --- appreciate nephrology recs  --- may need removal of tunneled catheter and chronic PICC in the setting of sepsis.    # Endocr  - Diabetes mellitus type 2  --- off insulin for years  --- monitor glucose on BMP and blood gases  - Hypothyroidism  --- cont levothyroxine    # Infectious Disease   - Sepsis (suspected due to rectal fever, hi WBC, low BP): diff dx includes acute cholecystitis, pneumonia, diverticulitis/gut translocation, and/or CLABSI  --- cont vanc by level  --- cont pip/tazo, renally adjusted  --- f/u BCx (NGTD)  --- Procalcitonin elevated, but un-interpretable in setting of ESRD  --- appreciate ID recs  ---Issue is source, control, patient needs IR perc anurag  - HBV vaccination not up to date --- consider HBV vaccination this admission    # VTE ppx: warfarin held in the setting of supra therapeutic INR.   # Dispo: CCU  # GOC: full code, discussed multiple times this admission and in past, including with palliative care. During prior admission pt had two healthcare proxy forms filled out on same day (3/9/2018), one designating sister as primary, the other designating wife as primary. Sister and wife each say their form was the more recent; neither form includes time, only date. Wife's form is the one that was scanned into EMR suggesting but not proving that hers was more recent. Best way forward would appear to be to try to get wife and sister in agreement about medical decision-making.

## 2018-06-08 NOTE — PROGRESS NOTE ADULT - PROBLEM SELECTOR PLAN 2
6/3 febrile last night. WBC went further up. Empiric antibiotics started by CCU team.  6/5: on broad spectrum antibiotics: ID following CT chest noted:  6/6: ? sc cholecystitis: cont antibiotics:  6/7: Ac cholecystitis: on antibiotics: Could not tolerate HIDA last night: Cont current management: defer to ID as well as surgery  6/8: as above: could not tolerate HIDA: ID following: Not really a surgical candidate:

## 2018-06-08 NOTE — PROGRESS NOTE ADULT - SUBJECTIVE AND OBJECTIVE BOX
Interventional radiology consulted for percutaneous cholecystostomy tube placement, as family is now "on board" with the procedure. I have seen the patient at bedside and had a prolonged, detailed discussion of the procedure, its benefits, risks, and complications with the patient and the family at bedside. Patient is in a semi-upright position on a non-rebreather mask, and cannot lie flat as per the CCU team. Patient is also on multiple pressors for pressure support. INR is actively being reversed. Given the clinical status of the patient and his grave prognosis, it was discussed with the patient and family that the patient will likely need to be intubated for the procedure. The patient was not agreeable to intubation, and the family requested time to "think about it and discuss it further." Family members also do not wish to proceed with the procedure until the INR is "below the needed number" to minimize bleeding risk. The procedure is on hold at this time, pending patient's/family's decision about intubation. This was discussed with CCU fellow as well as anesthesia team.

## 2018-06-08 NOTE — CHART NOTE - NSCHARTNOTEFT_GEN_A_CORE
Yesterday afternoon (6/7) had family meeting led by Palliative Care attending Dr. Riojas. Those present included patient's wife Otto Plascencia, sister Juanpablo Plascencia, son Kenton, and CCU PGY-1 Dr. Subramanian.     Purpose of meeting was to determine whether to proceed with percutaneous cholecystostomy to treat suspected acute cholecystitis. Patient himself did not actively participate in setting of his lethargy, but both wife and sister (each of whom has been designated as primary healthcare proxy on separate proxy forms each dating March 9, 2018) spoke on his behalf based on their longstanding knowledge of his goals of care and based on having talked to him earlier in the day.     Medical team advised that based on suspected cholecytistitis with clinical deterioration despite appropriately broad-spectrum antibiotic therapy, percutaneous cholecystostomy would be best chance to save patient's life. (Patient's outpatient cardiologist Dr. Davis had explained this to patient's wife earlier in day as well.) Unfortunately due to patient's restlessness (manifested by inability to tolerate HIDA scan 6/6 pm), tenuous respiratory status, and his verbalized refusal of perc anurag multiple times on 6/7 AM, for him to undergo percutaneous cholecystostomy would likely require elective intubation and that patient might not be able to be extubated. Patient's wife and sister both noted that patient had consistently said that he would not want tracheostomy.     In light of lack of 100% certainty that acute cholecystitis was diagnosis, in light of probable need for intubation in order to undergo perc anurag and unlikelihood of successful extubation, patient's sister and wife decided to continue with medical therapy even though they voiced understanding that it would give patient a poor chance for survival. Patient's son also voiced agreement with this plan.     Dr. Riojas advised that hemodialysis would be continued for now but that at a certain point due to hemodynamic instability it might no longer be offered.     Dr. Riojas also recommended that patient not be resuscitated or intubated if his heart were to stop or breathing were to fail. Patient's sister said that she agreed with the advisability of patient's being DNR/DNI but that he had in past consistently wanted to be full code; she said that she would continue to try to convince him to forego intubation and CPR.    Percutaneous cholecystostomy tube placement was canceled.     Patient remained full code. Yesterday afternoon (6/7) had family meeting led by Palliative Care attending Dr. Riojas. Those present included patient's wife Otto Plascencia, sister Juanpablo Plascencia, son Kenton, and CCU PGY-1 Dr. Subramanian.     Purpose of meeting was to determine whether to proceed with percutaneous cholecystostomy to treat suspected acute cholecystitis. Patient himself did not actively participate in setting of his lethargy, but both wife and sister (each of whom has been designated as primary healthcare proxy on separate proxy forms each dating March 9, 2018) spoke on his behalf based on their longstanding knowledge of his goals of care and based on having talked to him earlier in the day.     Medical team advised that based on suspected cholecytistitis with clinical deterioration despite appropriately broad-spectrum antibiotic therapy, percutaneous cholecystostomy would be best chance to save patient's life. (Patient's outpatient cardiologist Dr. Davis had explained this to patient's wife earlier in day as well.) Unfortunately due to patient's restlessness (manifested by inability to tolerate HIDA scan 6/6 pm), tenuous respiratory status, and his verbalized refusal of perc anurag multiple times on 6/7 AM, for him to undergo percutaneous cholecystostomy would likely require elective intubation and that patient might not be able to be extubated. Patient's wife and sister both noted that patient had consistently said that he would not want tracheostomy.     In light of lack of 100% certainty that acute cholecystitis was diagnosis, in light of probable need for intubation in order to undergo perc anurag and unlikelihood of successful extubation, patient's sister and wife decided not to pursue percutaneous cholecystostomy but instead to continue with medical therapy even though they voiced understanding that under this plan patient would have a poor chance for survival. Patient's son also voiced agreement with this plan.     Dr. Riojas advised that hemodialysis would be continued for now but that at a certain point due to hemodynamic instability it might no longer be offered.     Dr. Riojas also recommended that patient not be resuscitated or intubated if his heart were to stop or breathing were to fail. Patient's sister said that she agreed with the advisability of patient's being DNR/DNI but that he had in past consistently wanted to be full code; sister said that she would continue to try to convince him to forego intubation and CPR.    Percutaneous cholecystostomy tube placement was canceled.     Patient remained full code.

## 2018-06-08 NOTE — PROGRESS NOTE ADULT - PROBLEM SELECTOR PLAN 1
Pt hypotensive but tolerating HD, while on multiple pressors.   Target net UF goal 3.5kg.   c/w renal diet , fluid restriction 1L/day  Poor prognosis. Plans for family meeting and palliative care to discuss GOC.

## 2018-06-08 NOTE — PROGRESS NOTE ADULT - SUBJECTIVE AND OBJECTIVE BOX
I met with the family of Mr. Plascencia at their request and that of the nurse practitioner in the coronary care unit to discuss the risks of anesthesia and intubation. Mr. Plascencia is currently being considered for a percutaneous drain placement under image guidance. He has end stage heart failure with a severely dilated left heart with ejection fraction less than 15%. He is currently in septic shock requiring infusions of vasopressin, norephinephrine and dobutamine. Mr. Plascencia is receiving broad spectrum antibiotics for treatment of suspected infective cholecystitis. He is currently unable to lie flat without becoming acutely dyspneic. During the discussion with family he was observed to be sitting straight up in bed for comfort.     Given the nature of his multiple medical problems, end stage heart disease and septic shock, Mr. Plascencia classifies as an ASA 5. This carries the highest risk for cardiac and pulmonary complications with anesthesia. I discussed this with the family with particular attention paid to the possibilities of worsening heart function leading to poor perfusion to major organ systems which could lead to stroke, myocardial infarction or possibly death. I discussed the possibility of a prolonged intubation. The family demonstrated an understanding of these risks. I answered all of their questions.     Should Mr. Plascencia require this procedure and anesthetic care. It is recommended that he be cared for by a cardiac anesthesiologist. Additional monitoring with transesophageal echocardiography should be considered.      If the family or primary team have any further questions, please do not hesitate to contact the anesthesia.

## 2018-06-08 NOTE — PROGRESS NOTE ADULT - SUBJECTIVE AND OBJECTIVE BOX
Respiratory status has worsened  BP 90/47    Dobutamine @ 7.5 mcg/kg/min  Norepinephrine @ 1.95 mcg/kg/min  Vasopressin @ 0.04 units/hr  Bilateral crackles  Tachy irregular rhythm  2-3+ edema    Na  131 BUN 37  Crt 5.09  HCO3 15  WBC 17.63  Hgb 10.9  Hct 34.6  Plt 146  INR  7.68  PTT 77.9  ABG  7.24 / 41 / 91    Imp: Sepsis due to a biliary source.  He is now more acidotic with worsened respiratory status.  I have discussed with the extended family yesterday afternoon and reiterated this morning that he requires a biliary drainage procedure and it may be too late to save him as his status has worsened this morning.  They still have not decided wether to proceed with a cholecystotomy

## 2018-06-08 NOTE — CHART NOTE - NSCHARTNOTEFT_GEN_A_CORE
SURGERY EVENT NOTE    Appreciate goals of care discussions. Will sign off for now given families reluctance to pursue intervention.    Please page [phone] with any questions.

## 2018-06-08 NOTE — PROGRESS NOTE ADULT - SUBJECTIVE AND OBJECTIVE BOX
Subjective: He is fatigued and poorly communicative. Sitting in chair.     Medications:  ALBUTerol/ipratropium for Nebulization 3 milliLiter(s) Nebulizer every 6 hours PRN  amiodarone    Tablet 100 milliGRAM(s) Oral daily  atorvastatin 80 milliGRAM(s) Oral at bedtime  Biotene Dry Mouth Oral Rinse 5 milliLiter(s) Swish and Spit four times a day PRN  bisacodyl 5 milliGRAM(s) Oral at bedtime  chlorhexidine 4% Liquid 1 Application(s) Topical <User Schedule>  dextrose 40% Gel 15 Gram(s) Oral once PRN  dextrose 50% Injectable 12.5 Gram(s) IV Push once  dextrose 50% Injectable 25 Gram(s) IV Push once  dextrose 50% Injectable 25 Gram(s) IV Push once  DOBUTamine Infusion 7.5 MICROgram(s)/kG/Min IV Continuous <Continuous>  docusate sodium 100 milliGRAM(s) Oral two times a day  finasteride 5 milliGRAM(s) Oral daily  insulin lispro (HumaLOG) corrective regimen sliding scale   SubCutaneous three times a day before meals  insulin lispro (HumaLOG) corrective regimen sliding scale   SubCutaneous at bedtime  levothyroxine 75 MICROGram(s) Oral daily  midodrine 30 milliGRAM(s) Oral three times a day  norepinephrine Infusion 1.2 MICROgram(s)/kG/Min IV Continuous <Continuous>  pantoprazole    Tablet 40 milliGRAM(s) Oral before breakfast  phenylephrine    Infusion 0.5 MICROgram(s)/kG/Min IV Continuous <Continuous>  piperacillin/tazobactam IVPB. 3.375 Gram(s) IV Intermittent every 12 hours  prothrombin complex concentrate IVPB (KCENTRA) 1500 International Unit(s) IV Intermittent once  senna 2 Tablet(s) Oral at bedtime  sevelamer hydrochloride 1600 milliGRAM(s) Oral <User Schedule>  sodium chloride 0.65% Nasal 1 Spray(s) Both Nostrils four times a day  vasopressin Infusion 0.04 Unit(s)/Min IV Continuous <Continuous>      Physical Exam:    Vitals:  T(C): 35.9 (18 @ 13:00), Max: 36.7 (18 @ 16:10)  HR: 118 (18 @ 15:03) (96 - 143)  BP: 126/65 (18 @ 13:00) (106/43 - 126/65)  BP(mean): --  ABP: 91/50 (18 @ 13:00) (67/40 - 97/56)  ABP(mean): 62 (18 @ 13:00) (49 - 70)  RR: 22 (18 @ 13:00) (15 - 29)  SpO2: 92% (18 @ 15:03) (91% - 100%)    Daily Weight in k (2018 13:00)    I&O's Summary    2018 07:  -  2018 07:00  --------------------------------------------------------  IN: 3113.2 mL / OUT: 0 mL / NET: 3113.2 mL    2018 07:  -  2018 15:30  --------------------------------------------------------  IN: 978.8 mL / OUT: 4000 mL / NET: -3021.2 mL    General: No distress. Comfortable.  HEENT: EOM intact.  Neck: Neck supple. JVP moderately elevated. No masses  Chest: Bilateral crackles.   CV: Tachycardic, but regular. Normal S1 and S2. No rubs or gallops. Radial pulses normal.  Abdomen: Soft, non-distended  Skin: No rashes or skin breakdown  Neurology: Difficult to assess. Responds to voice.   Psych: Unable to assess.     Labs:                        10.9   17.63 )-----------( 146      ( 2018 03:40 )             34.6     06-08    131<L>  |  91<L>  |  37<H>  ----------------------------<  219<H>  4.6   |  15<L>  |  5.09<H>    Ca    8.6      2018 03:40  Phos  4.4       Mg     2.2     -08    TPro  8.0  /  Alb  3.1<L>  /  TBili  3.5<H>  /  DBili  x   /  AST  72<H>  /  ALT  41  /  AlkPhos  641<H>      PT/INR - ( 2018 03:40 )   PT: 92.1 SEC;   INR: 7.68       PTT - ( 2018 03:40 )  PTT:77.9 SEC

## 2018-06-08 NOTE — PROGRESS NOTE ADULT - SUBJECTIVE AND OBJECTIVE BOX
Follow Up:      Inverval History/ROS:Patient is a 63y old  Male who presents with a chief complaint of SOB (29 May 2018 15:55)  On facemask. On pressors. On ionotrphs.    Lethargic.         Allergies    No Known Allergies    Intolerances        ANTIMICROBIALS:  piperacillin/tazobactam IVPB. 3.375 every 12 hours      OTHER MEDS:  ALBUTerol/ipratropium for Nebulization 3 milliLiter(s) Nebulizer every 6 hours PRN  amiodarone    Tablet 100 milliGRAM(s) Oral daily  atorvastatin 80 milliGRAM(s) Oral at bedtime  Biotene Dry Mouth Oral Rinse 5 milliLiter(s) Swish and Spit four times a day PRN  bisacodyl 5 milliGRAM(s) Oral at bedtime  chlorhexidine 4% Liquid 1 Application(s) Topical <User Schedule>  dextrose 40% Gel 15 Gram(s) Oral once PRN  dextrose 50% Injectable 12.5 Gram(s) IV Push once  dextrose 50% Injectable 25 Gram(s) IV Push once  dextrose 50% Injectable 25 Gram(s) IV Push once  DOBUTamine Infusion 7.5 MICROgram(s)/kG/Min IV Continuous <Continuous>  docusate sodium 100 milliGRAM(s) Oral two times a day  finasteride 5 milliGRAM(s) Oral daily  insulin lispro (HumaLOG) corrective regimen sliding scale   SubCutaneous three times a day before meals  insulin lispro (HumaLOG) corrective regimen sliding scale   SubCutaneous at bedtime  levothyroxine 75 MICROGram(s) Oral daily  midodrine 30 milliGRAM(s) Oral three times a day  norepinephrine Infusion 1.2 MICROgram(s)/kG/Min IV Continuous <Continuous>  pantoprazole    Tablet 40 milliGRAM(s) Oral before breakfast  phenylephrine    Infusion 0.5 MICROgram(s)/kG/Min IV Continuous <Continuous>  phytonadione  IVPB 5 milliGRAM(s) IV Intermittent once  senna 2 Tablet(s) Oral at bedtime  sevelamer hydrochloride 1600 milliGRAM(s) Oral <User Schedule>  sodium chloride 0.65% Nasal 1 Spray(s) Both Nostrils four times a day  vasopressin Infusion 0.04 Unit(s)/Min IV Continuous <Continuous>      Vital Signs Last 24 Hrs  T(C): 35.9 (08 Jun 2018 13:00), Max: 36.7 (07 Jun 2018 16:10)  T(F): 96.6 (08 Jun 2018 13:00), Max: 98.1 (07 Jun 2018 16:10)  HR: 96 (08 Jun 2018 13:00) (96 - 143)  BP: 126/65 (08 Jun 2018 13:00) (106/43 - 126/65)  BP(mean): --  RR: 22 (08 Jun 2018 13:00) (15 - 29)  SpO2: 99% (08 Jun 2018 11:20) (91% - 100%)    PHYSICAL EXAM:  General: [x ] distress  HEAD/EYES: [ ] PERRL [x ] white sclera [ ] icterus  ENT:  [ ] normal [x ] supple [ ] thrush [ ] pharyngeal exudate  Cardiovascular:   [ ] murmur [x ] normal [ ] PPM/AICD  Respiratory:  [x ] clear to ausculation bilaterally  GI:  [x ] soft, non-tender, normal bowel sounds, RUQ tender  :  [ ] rosa [x ] no CVA tenderness   Musculoskeletal:  [x ] no synovitis  Neurologic:  [x ] non-focal exam   Skin:  [ ] no rash  Lymph: [ x] no lymphadenopathy  Psychiatric:  [ ] appropriate affect [ ] alert & oriented  Lines:  [x ] no phlebitis [ ] central line                                10.9   17.63 )-----------( 146      ( 08 Jun 2018 03:40 )             34.6       06-08    131<L>  |  91<L>  |  37<H>  ----------------------------<  219<H>  4.6   |  15<L>  |  5.09<H>    Ca    8.6      08 Jun 2018 03:40  Phos  4.4     06-08  Mg     2.2     06-08    TPro  8.0  /  Alb  3.1<L>  /  TBili  3.5<H>  /  DBili  x   /  AST  72<H>  /  ALT  41  /  AlkPhos  641<H>  06-08          MICROBIOLOGY:    RADIOLOGY:

## 2018-06-08 NOTE — PROGRESS NOTE ADULT - SUBJECTIVE AND OBJECTIVE BOX
HPI/INTERVAL HISTORY:  Patient seen and examined at bedside.  Uncomfortable appearing, wearing BiPap.  Denies chest pain, N/V.  Endorses difficulty breathing.      OBJECTIVE:  VITAL SIGNS:  ICU Vital Signs Last 24 Hrs  T(C): 36.4 (08 Jun 2018 00:00), Max: 36.7 (07 Jun 2018 16:10)  T(F): 97.6 (08 Jun 2018 00:00), Max: 98.1 (07 Jun 2018 16:10)  HR: 107 (08 Jun 2018 08:10) (97 - 121)  BP: 110/56 (07 Jun 2018 10:00) (110/56 - 110/56)  BP(mean): 70 (07 Jun 2018 10:00) (70 - 70)  ABP: 90/47 (08 Jun 2018 06:00) (72/38 - 97/56)  ABP(mean): 61 (08 Jun 2018 06:00) (49 - 70)  RR: 24 (08 Jun 2018 06:00) (15 - 29)  SpO2: 98% (08 Jun 2018 08:10) (93% - 100%)        06-07 @ 07:01  -  06-08 @ 07:00  --------------------------------------------------------  IN: 3113.2 mL / OUT: 0 mL / NET: 3113.2 mL      CAPILLARY BLOOD GLUCOSE      POCT Blood Glucose.: 187 mg/dL (07 Jun 2018 21:17)      PHYSICAL EXAM:  Gen: uncomfortable, restless  HEENT: NC/AT; PERRL, anicteric sclera  Neck: supple, no JVD  Resp: crackles bilaterally  Cardiovasc: Tachy, IR  GI: soft, nondistended, nontender; +BS  Extr: warm, well-perfused, PT/DP pulses 2+ B/L; no LE edema  Skin: normal color and turgor  Neuro: nonfocal    LABS:                        10.9   17.63 )-----------( 146      ( 08 Jun 2018 03:40 )             34.6     06-08    131<L>  |  91<L>  |  37<H>  ----------------------------<  219<H>  4.6   |  15<L>  |  5.09<H>    Ca    8.6      08 Jun 2018 03:40  Phos  4.4     06-08  Mg     2.2     06-08    TPro  8.0  /  Alb  3.1<L>  /  TBili  3.5<H>  /  DBili  x   /  AST  72<H>  /  ALT  41  /  AlkPhos  641<H>  06-08    LIVER FUNCTIONS - ( 08 Jun 2018 03:40 )  Alb: 3.1 g/dL / Pro: 8.0 g/dL / ALK PHOS: 641 u/L / ALT: 41 u/L / AST: 72 u/L / GGT: x           PT/INR - ( 08 Jun 2018 03:40 )   PT: 92.1 SEC;   INR: 7.68          PTT - ( 08 Jun 2018 03:40 )  PTT:77.9 SEC          RADIOLOGY & ADDITIONAL TESTS: Reviewed.    ALBUTerol/ipratropium for Nebulization 3 milliLiter(s) Nebulizer every 6 hours PRN  amiodarone    Tablet 100 milliGRAM(s) Oral daily  atorvastatin 80 milliGRAM(s) Oral at bedtime  Biotene Dry Mouth Oral Rinse 5 milliLiter(s) Swish and Spit four times a day PRN  bisacodyl 5 milliGRAM(s) Oral at bedtime  chlorhexidine 4% Liquid 1 Application(s) Topical <User Schedule>  dextrose 40% Gel 15 Gram(s) Oral once PRN  dextrose 50% Injectable 12.5 Gram(s) IV Push once  dextrose 50% Injectable 25 Gram(s) IV Push once  dextrose 50% Injectable 25 Gram(s) IV Push once  DOBUTamine Infusion 7.5 MICROgram(s)/kG/Min IV Continuous <Continuous>  docusate sodium 100 milliGRAM(s) Oral two times a day  finasteride 5 milliGRAM(s) Oral daily  insulin lispro (HumaLOG) corrective regimen sliding scale   SubCutaneous three times a day before meals  insulin lispro (HumaLOG) corrective regimen sliding scale   SubCutaneous at bedtime  levothyroxine 75 MICROGram(s) Oral daily  midodrine 30 milliGRAM(s) Oral three times a day  norepinephrine Infusion 1.2 MICROgram(s)/kG/Min IV Continuous <Continuous>  pantoprazole    Tablet 40 milliGRAM(s) Oral before breakfast  phenylephrine    Infusion 0.5 MICROgram(s)/kG/Min IV Continuous <Continuous>  piperacillin/tazobactam IVPB. 3.375 Gram(s) IV Intermittent every 12 hours  senna 2 Tablet(s) Oral at bedtime  sevelamer hydrochloride 1600 milliGRAM(s) Oral <User Schedule>  sodium chloride 0.65% Nasal 1 Spray(s) Both Nostrils four times a day  vasopressin Infusion 0.04 Unit(s)/Min IV Continuous <Continuous>      No Known Allergies

## 2018-06-08 NOTE — PROGRESS NOTE ADULT - ATTENDING COMMENTS
events noted: Had a detailed discussion with the sister: Given my office number too: She will get me the official lung biopsy report from [place]:  6/4: As above:  6/5: Doing poorly: in shock and on multiple pressors with dobutamine though alert and awake: Cont current supportive care:  6/6: pt is acutely ill , and now with suspicion of acute cholecystitis: Pt has been refusing fro HIDA scan: On antibiotics: cont current supportive care in CCU:  6/7: Has multiple organ failure with coagulopathy: And ac cholecystitis: Cont current care per CCU team  6/8: Pt is critically ill with multiple vasopressors and is on bipap with suspected ac cholecystitis: Cont current management: Cardiology as well as CCU note noted:

## 2018-06-08 NOTE — PROGRESS NOTE ADULT - PROBLEM SELECTOR PLAN 1
Is hypotensive today: on vasopressin as well as norepinephrine to map > 65 mm Hg.  6/6: has suspicion of cholecystitis: Pt is refusing for HIDA scan: He is on broad spectrum antibiotics: Defer to surgery and primary tea for the same: Cont supportive treatment: He remans on vasopressors at this time:"  6/7: cont vasopresors as well as dobutamine  6/8: worsoned: on multiple vasopressors as well as dobutaime: It will be hard for him to get netter without the source control: ? likely source ac cholecystisi: Pneumonia is possibility but chest x-ray always been abnormal secondary to pulmonary fibrosis: Moreover it can be fluid in lungs making it look worse: In any case he is on broad spectrum antibiotics: Probably a combined septic as well as cardiogenic shock:

## 2018-06-08 NOTE — PROGRESS NOTE ADULT - PROBLEM SELECTOR PLAN 9
monitor his blood glucose:  not on FS defer to CCU  6/3 FS with sliding scale  6/5: blood glucsoe running high: would defer to primary team for the management:  6/8: Blood glucsoe is decently controlled:

## 2018-06-08 NOTE — PROGRESS NOTE ADULT - SUBJECTIVE AND OBJECTIVE BOX
Pt seen and examined during HD at bedside.  Pt lethargic, on Bipap.     Allergies:  No Known Allergies    Hospital Medications:   MEDICATIONS  (STANDING):  amiodarone    Tablet 100 milliGRAM(s) Oral daily  atorvastatin 80 milliGRAM(s) Oral at bedtime  bisacodyl 5 milliGRAM(s) Oral at bedtime  chlorhexidine 4% Liquid 1 Application(s) Topical <User Schedule>  dextrose 50% Injectable 12.5 Gram(s) IV Push once  dextrose 50% Injectable 25 Gram(s) IV Push once  dextrose 50% Injectable 25 Gram(s) IV Push once  DOBUTamine Infusion 7.5 MICROgram(s)/kG/Min (15.975 mL/Hr) IV Continuous <Continuous>  docusate sodium 100 milliGRAM(s) Oral two times a day  finasteride 5 milliGRAM(s) Oral daily  insulin lispro (HumaLOG) corrective regimen sliding scale   SubCutaneous three times a day before meals  insulin lispro (HumaLOG) corrective regimen sliding scale   SubCutaneous at bedtime  levothyroxine 75 MICROGram(s) Oral daily  midodrine 30 milliGRAM(s) Oral three times a day  norepinephrine Infusion 1.2 MICROgram(s)/kG/Min (79.875 mL/Hr) IV Continuous <Continuous>  pantoprazole    Tablet 40 milliGRAM(s) Oral before breakfast  phenylephrine    Infusion 0.5 MICROgram(s)/kG/Min (6.656 mL/Hr) IV Continuous <Continuous>  piperacillin/tazobactam IVPB. 3.375 Gram(s) IV Intermittent every 12 hours  senna 2 Tablet(s) Oral at bedtime  sevelamer hydrochloride 1600 milliGRAM(s) Oral <User Schedule>  sodium chloride 0.65% Nasal 1 Spray(s) Both Nostrils four times a day  vasopressin Infusion 0.04 Unit(s)/Min (2.4 mL/Hr) IV Continuous <Continuous>    VITALS:  T(F): 97.2 (06-08-18 @ 08:45), Max: 98.1 (06-07-18 @ 16:10)  HR: 122 (06-08-18 @ 11:20)  BP: 106/43 (06-08-18 @ 08:45)  RR: 23 (06-08-18 @ 10:00)  SpO2: 99% (06-08-18 @ 11:20)  Wt(kg): --    06-07 @ 07:01  -  06-08 @ 07:00  --------------------------------------------------------  IN: 3113.2 mL / OUT: 0 mL / NET: 3113.2 mL    06-08 @ 07:01  - 06-08 @ 12:30  --------------------------------------------------------  IN: 478.8 mL / OUT: 0 mL / NET: 478.8 mL      PHYSICAL EXAM:  Constitutional: NAD  HEENT: anicteric sclera, oropharynx clear, MMM  Neck: No JVD  Respiratory: Diminshed breath sounds.   Cardiovascular: S1, S2, RRR  Gastrointestinal: BS+, soft, NT/ND  Extremities: No cyanosis or clubbing. Trace peripheral edema  Neurological: A/O x 3, no focal deficits  Psychiatric: Normal mood, normal affect  : No CVA tenderness. No rosa.   Skin: No rashes  Vascular Access: [facility] Tunneled cath     LABS:  06-08    131<L>  |  91<L>  |  37<H>  ----------------------------<  219<H>  4.6   |  15<L>  |  5.09<H>    Ca    8.6      08 Jun 2018 03:40  Phos  4.4     06-08  Mg     2.2     06-08    TPro  8.0  /  Alb  3.1<L>  /  TBili  3.5<H>  /  DBili      /  AST  72<H>  /  ALT  41  /  AlkPhos  641<H>  06-08    Creatinine Trend: 5.09 <--, 3.90 <--, 6.09 <--, 5.44 <--, 4.58 <--, 4.06 <--, 6.49 <--, 5.38 <--, 4.20 <--                        10.9   17.63 )-----------( 146      ( 08 Jun 2018 03:40 )             34.6     Urine Studies:      RADIOLOGY & ADDITIONAL STUDIES:

## 2018-06-08 NOTE — PROGRESS NOTE ADULT - ASSESSMENT
Mr. Plascencia is a 63 year old man with acute on chronic systolic heart failure on chronic inotropic support with severe pulmonary fibrosis complicated by acute on chronic respiratory failure currently with evidence of vasodilatory septic shock likely due to acute calculous cholecystitis. He is on high dose vasopressor and inotropic support. He is at high risk for further decompensation and death. His INR is supratherapeutic.     Please call me with questions at [phone]. Plan discussed with patient's family, IR, and CCU teams.

## 2018-06-08 NOTE — PROGRESS NOTE ADULT - ASSESSMENT
63 year old with heart failure with hypotnesion (requiring pressors/ inotrophs), leukocytosis and abdominal pain.    His pain localizes to the right side and his imaging raises a concern for a thickened gallbladder.    High suspicion for cholecystitis.    Not responding to abx. Likely needs source control.     Antibiotics may not be sufficient if this is cholecystis.    Continue zosyn/ vanco by level.    Possible cholecystomy    Prognosis grave.     Call the ID service with questions or concerns over the weekend.  [phone]

## 2018-06-09 VITALS — RESPIRATION RATE: 15 BRPM | HEART RATE: 65 BPM

## 2018-06-09 DIAGNOSIS — N40.0 BENIGN PROSTATIC HYPERPLASIA WITHOUT LOWER URINARY TRACT SYMPTOMS: ICD-10-CM

## 2018-06-09 DIAGNOSIS — K81.0 ACUTE CHOLECYSTITIS: ICD-10-CM

## 2018-06-09 DIAGNOSIS — N18.6 END STAGE RENAL DISEASE: ICD-10-CM

## 2018-06-09 DIAGNOSIS — I48.2 CHRONIC ATRIAL FIBRILLATION: ICD-10-CM

## 2018-06-09 DIAGNOSIS — Z51.5 ENCOUNTER FOR PALLIATIVE CARE: ICD-10-CM

## 2018-06-09 DIAGNOSIS — E11.9 TYPE 2 DIABETES MELLITUS WITHOUT COMPLICATIONS: ICD-10-CM

## 2018-06-09 DIAGNOSIS — I50.82 BIVENTRICULAR HEART FAILURE: ICD-10-CM

## 2018-06-09 LAB
ALBUMIN SERPL ELPH-MCNC: 2.8 G/DL — LOW (ref 3.3–5)
ALP SERPL-CCNC: 714 U/L — HIGH (ref 40–120)
ALT FLD-CCNC: 76 U/L — HIGH (ref 4–41)
ANISOCYTOSIS BLD QL: SIGNIFICANT CHANGE UP
APTT BLD: 43.1 SEC — HIGH (ref 27.5–37.4)
AST SERPL-CCNC: 173 U/L — HIGH (ref 4–40)
BACTERIA BLD CULT: SIGNIFICANT CHANGE UP
BASE EXCESS BLDA CALC-SCNC: -9.2 MMOL/L — SIGNIFICANT CHANGE UP
BASOPHILS # BLD AUTO: 0.05 K/UL — SIGNIFICANT CHANGE UP (ref 0–0.2)
BASOPHILS NFR BLD AUTO: 0.2 % — SIGNIFICANT CHANGE UP (ref 0–2)
BASOPHILS NFR SPEC: 0 % — SIGNIFICANT CHANGE UP (ref 0–2)
BILIRUB SERPL-MCNC: 4.5 MG/DL — HIGH (ref 0.2–1.2)
BUN SERPL-MCNC: 24 MG/DL — HIGH (ref 7–23)
CALCIUM SERPL-MCNC: 8 MG/DL — LOW (ref 8.4–10.5)
CHLORIDE BLDA-SCNC: 113 MMOL/L — HIGH (ref 96–108)
CHLORIDE SERPL-SCNC: 99 MMOL/L — SIGNIFICANT CHANGE UP (ref 98–107)
CO2 SERPL-SCNC: 15 MMOL/L — LOW (ref 22–31)
CREAT SERPL-MCNC: 3.44 MG/DL — HIGH (ref 0.5–1.3)
EOSINOPHIL # BLD AUTO: 0.06 K/UL — SIGNIFICANT CHANGE UP (ref 0–0.5)
EOSINOPHIL NFR BLD AUTO: 0.3 % — SIGNIFICANT CHANGE UP (ref 0–6)
EOSINOPHIL NFR FLD: 0 % — SIGNIFICANT CHANGE UP (ref 0–6)
GIANT PLATELETS BLD QL SMEAR: PRESENT — SIGNIFICANT CHANGE UP
GLUCOSE BLDA-MCNC: 101 MG/DL — HIGH (ref 70–99)
GLUCOSE SERPL-MCNC: 82 MG/DL — SIGNIFICANT CHANGE UP (ref 70–99)
HCO3 BLDA-SCNC: 17 MMOL/L — LOW (ref 22–26)
HCT VFR BLD CALC: 33.3 % — LOW (ref 39–50)
HCT VFR BLDA CALC: 32.9 % — LOW (ref 39–51)
HGB BLD-MCNC: 10.7 G/DL — LOW (ref 13–17)
HGB BLDA-MCNC: 10.6 G/DL — LOW (ref 13–17)
IMM GRANULOCYTES # BLD AUTO: 0.85 # — SIGNIFICANT CHANGE UP
IMM GRANULOCYTES NFR BLD AUTO: 4.1 % — HIGH (ref 0–1.5)
INR BLD: 2.42 — HIGH (ref 0.88–1.17)
LACTATE BLDA-SCNC: 5.2 MMOL/L — CRITICAL HIGH (ref 0.5–2)
LYMPHOCYTES # BLD AUTO: 1.12 K/UL — SIGNIFICANT CHANGE UP (ref 1–3.3)
LYMPHOCYTES # BLD AUTO: 5.5 % — LOW (ref 13–44)
LYMPHOCYTES NFR SPEC AUTO: 1.8 % — LOW (ref 13–44)
MACROCYTES BLD QL: SIGNIFICANT CHANGE UP
MAGNESIUM SERPL-MCNC: 2.2 MG/DL — SIGNIFICANT CHANGE UP (ref 1.6–2.6)
MCHC RBC-ENTMCNC: 30.4 PG — SIGNIFICANT CHANGE UP (ref 27–34)
MCHC RBC-ENTMCNC: 32.1 % — SIGNIFICANT CHANGE UP (ref 32–36)
MCV RBC AUTO: 94.6 FL — SIGNIFICANT CHANGE UP (ref 80–100)
MONOCYTES # BLD AUTO: 1.84 K/UL — HIGH (ref 0–0.9)
MONOCYTES NFR BLD AUTO: 9 % — SIGNIFICANT CHANGE UP (ref 2–14)
MONOCYTES NFR BLD: 3.5 % — SIGNIFICANT CHANGE UP (ref 2–9)
NEUTROPHIL AB SER-ACNC: 90.4 % — HIGH (ref 43–77)
NEUTROPHILS # BLD AUTO: 16.61 K/UL — HIGH (ref 1.8–7.4)
NEUTROPHILS NFR BLD AUTO: 80.9 % — HIGH (ref 43–77)
NRBC # FLD: 0.03 — SIGNIFICANT CHANGE UP
OTHER - HEMATOLOGY %: 1.7 — SIGNIFICANT CHANGE UP
PCO2 BLDA: 39 MMHG — SIGNIFICANT CHANGE UP (ref 35–48)
PH BLDA: 7.25 PH — LOW (ref 7.35–7.45)
PHOSPHATE SERPL-MCNC: 3.9 MG/DL — SIGNIFICANT CHANGE UP (ref 2.5–4.5)
PLATELET # BLD AUTO: 161 K/UL — SIGNIFICANT CHANGE UP (ref 150–400)
PLATELET COUNT - ESTIMATE: NORMAL — SIGNIFICANT CHANGE UP
PMV BLD: 11.7 FL — SIGNIFICANT CHANGE UP (ref 7–13)
PO2 BLDA: 118 MMHG — HIGH (ref 83–108)
POIKILOCYTOSIS BLD QL AUTO: SLIGHT — SIGNIFICANT CHANGE UP
POLYCHROMASIA BLD QL SMEAR: SIGNIFICANT CHANGE UP
POTASSIUM BLDA-SCNC: 3.7 MMOL/L — SIGNIFICANT CHANGE UP (ref 3.4–4.5)
POTASSIUM SERPL-MCNC: 4.2 MMOL/L — SIGNIFICANT CHANGE UP (ref 3.5–5.3)
POTASSIUM SERPL-SCNC: 4.2 MMOL/L — SIGNIFICANT CHANGE UP (ref 3.5–5.3)
PROT SERPL-MCNC: 7.7 G/DL — SIGNIFICANT CHANGE UP (ref 6–8.3)
PROTHROM AB SERPL-ACNC: 27.3 SEC — HIGH (ref 9.8–13.1)
RBC # BLD: 3.52 M/UL — LOW (ref 4.2–5.8)
RBC # FLD: 15.4 % — HIGH (ref 10.3–14.5)
SAO2 % BLDA: 96.7 % — SIGNIFICANT CHANGE UP (ref 95–99)
SODIUM BLDA-SCNC: 138 MMOL/L — SIGNIFICANT CHANGE UP (ref 136–146)
SODIUM SERPL-SCNC: 140 MMOL/L — SIGNIFICANT CHANGE UP (ref 135–145)
SPECIMEN SOURCE: SIGNIFICANT CHANGE UP
VARIANT LYMPHS # BLD: 2.6 % — SIGNIFICANT CHANGE UP
WBC # BLD: 20.53 K/UL — HIGH (ref 3.8–10.5)
WBC # FLD AUTO: 20.53 K/UL — HIGH (ref 3.8–10.5)

## 2018-06-09 PROCEDURE — 99239 HOSP IP/OBS DSCHRG MGMT >30: CPT

## 2018-06-09 PROCEDURE — 71045 X-RAY EXAM CHEST 1 VIEW: CPT | Mod: 26

## 2018-06-09 PROCEDURE — 99232 SBSQ HOSP IP/OBS MODERATE 35: CPT

## 2018-06-09 RX ORDER — DEXTROSE 50 % IN WATER 50 %
50 SYRINGE (ML) INTRAVENOUS ONCE
Qty: 0 | Refills: 0 | Status: COMPLETED | OUTPATIENT
Start: 2018-06-09 | End: 2018-06-09

## 2018-06-09 RX ORDER — ACETAMINOPHEN 500 MG
650 TABLET ORAL ONCE
Qty: 0 | Refills: 0 | Status: COMPLETED | OUTPATIENT
Start: 2018-06-09 | End: 2018-06-09

## 2018-06-09 RX ORDER — DEXTROSE 10 % IN WATER 10 %
1000 INTRAVENOUS SOLUTION INTRAVENOUS
Qty: 0 | Refills: 0 | Status: DISCONTINUED | OUTPATIENT
Start: 2018-06-09 | End: 2018-06-09

## 2018-06-09 RX ORDER — IPRATROPIUM/ALBUTEROL SULFATE 18-103MCG
3 AEROSOL WITH ADAPTER (GRAM) INHALATION ONCE
Qty: 0 | Refills: 0 | Status: COMPLETED | OUTPATIENT
Start: 2018-06-09 | End: 2018-06-09

## 2018-06-09 RX ORDER — HYDROMORPHONE HYDROCHLORIDE 2 MG/ML
0.2 INJECTION INTRAMUSCULAR; INTRAVENOUS; SUBCUTANEOUS
Qty: 0 | Refills: 0 | Status: DISCONTINUED | OUTPATIENT
Start: 2018-06-09 | End: 2018-06-09

## 2018-06-09 RX ORDER — DEXTROSE 50 % IN WATER 50 %
50 SYRINGE (ML) INTRAVENOUS ONCE
Qty: 0 | Refills: 0 | Status: DISCONTINUED | OUTPATIENT
Start: 2018-06-09 | End: 2018-06-09

## 2018-06-09 RX ORDER — HYDROMORPHONE HYDROCHLORIDE 2 MG/ML
0.5 INJECTION INTRAMUSCULAR; INTRAVENOUS; SUBCUTANEOUS
Qty: 0 | Refills: 0 | Status: DISCONTINUED | OUTPATIENT
Start: 2018-06-09 | End: 2018-06-09

## 2018-06-09 RX ADMIN — Medication 1 SPRAY(S): at 12:21

## 2018-06-09 RX ADMIN — Medication 1 SPRAY(S): at 00:40

## 2018-06-09 RX ADMIN — PANTOPRAZOLE SODIUM 40 MILLIGRAM(S): 20 TABLET, DELAYED RELEASE ORAL at 05:44

## 2018-06-09 RX ADMIN — Medication 50 MILLILITER(S): at 09:50

## 2018-06-09 RX ADMIN — Medication 3 MILLILITER(S): at 02:52

## 2018-06-09 RX ADMIN — Medication 79.88 MICROGRAM(S)/KG/MIN: at 15:39

## 2018-06-09 RX ADMIN — Medication 50 MILLILITER(S): at 08:43

## 2018-06-09 RX ADMIN — Medication 75 MICROGRAM(S): at 05:45

## 2018-06-09 RX ADMIN — PIPERACILLIN AND TAZOBACTAM 25 GRAM(S): 4; .5 INJECTION, POWDER, LYOPHILIZED, FOR SOLUTION INTRAVENOUS at 05:43

## 2018-06-09 RX ADMIN — AMIODARONE HYDROCHLORIDE 100 MILLIGRAM(S): 400 TABLET ORAL at 05:42

## 2018-06-09 RX ADMIN — Medication 50 MILLILITER(S): at 09:00

## 2018-06-09 RX ADMIN — Medication 650 MILLIGRAM(S): at 03:50

## 2018-06-09 RX ADMIN — CHLORHEXIDINE GLUCONATE 1 APPLICATION(S): 213 SOLUTION TOPICAL at 12:21

## 2018-06-09 RX ADMIN — Medication 650 MILLIGRAM(S): at 04:20

## 2018-06-09 RX ADMIN — Medication 1 SPRAY(S): at 05:42

## 2018-06-09 RX ADMIN — Medication 100 MILLIGRAM(S): at 05:44

## 2018-06-09 RX ADMIN — MIDODRINE HYDROCHLORIDE 30 MILLIGRAM(S): 2.5 TABLET ORAL at 05:43

## 2018-06-09 RX ADMIN — HYDROMORPHONE HYDROCHLORIDE 0.2 MILLIGRAM(S): 2 INJECTION INTRAMUSCULAR; INTRAVENOUS; SUBCUTANEOUS at 10:33

## 2018-06-09 NOTE — PROGRESS NOTE ADULT - PROBLEM SELECTOR PLAN 2
6/3 febrile last night. WBC went further up. Empiric antibiotics started by CCU team.  6/5: on broad spectrum antibiotics: ID following CT chest noted:  6/6: ? sc cholecystitis: cont antibiotics:  6/7: Ac cholecystitis: on antibiotics: Could not tolerate HIDA last night: Cont current management: defer to ID as well as surgery  6/8: as above: could not tolerate HIDA: ID following: Not really a surgical candidate:  6/9 ID note appreciated. On Zosyn and Vanco

## 2018-06-09 NOTE — PROGRESS NOTE ADULT - NSHPATTENDINGPLANDISCUSS_GEN_ALL_CORE
CCU Team
pt, CCU resident earlier
pt, HD RN
pt
pt, CCU resident
pt, CCU resident
pt, CCU resident, HD RN
pt, family bedside
CCU team
ICU team
ICU team
ccu team
ccu team

## 2018-06-09 NOTE — PROVIDER CONTACT NOTE (EICU) - SITUATION
pt DNR CHF requiring pressors and dobutrex
EICU notified of patient expiration.  EICU stated will call Ahsan WHITE (organ donation)

## 2018-06-09 NOTE — PROGRESS NOTE ADULT - SUBJECTIVE AND OBJECTIVE BOX
Patient is a 63y old  Male who presents with a chief complaint of SOB (29 May 2018 15:55)    Any change in ROS:  on bipap, on pressors and doub. hypotensive.  Pt is critically ill and prognosis is guarded     MEDICATIONS  (STANDING):  amiodarone    Tablet 100 milliGRAM(s) Oral daily  atorvastatin 80 milliGRAM(s) Oral at bedtime  bisacodyl 5 milliGRAM(s) Oral at bedtime  chlorhexidine 4% Liquid 1 Application(s) Topical <User Schedule>  dextrose 10%. 1000 milliLiter(s) (75 mL/Hr) IV Continuous <Continuous>  dextrose 50% Injectable 12.5 Gram(s) IV Push once  dextrose 50% Injectable 25 Gram(s) IV Push once  dextrose 50% Injectable 25 Gram(s) IV Push once  dextrose 50% Injectable 50 milliLiter(s) IV Push once  DOBUTamine Infusion 7.5 MICROgram(s)/kG/Min (15.975 mL/Hr) IV Continuous <Continuous>  docusate sodium 100 milliGRAM(s) Oral two times a day  finasteride 5 milliGRAM(s) Oral daily  insulin lispro (HumaLOG) corrective regimen sliding scale   SubCutaneous three times a day before meals  insulin lispro (HumaLOG) corrective regimen sliding scale   SubCutaneous at bedtime  levothyroxine 75 MICROGram(s) Oral daily  midodrine 30 milliGRAM(s) Oral three times a day  norepinephrine Infusion 1.2 MICROgram(s)/kG/Min (79.875 mL/Hr) IV Continuous <Continuous>  pantoprazole    Tablet 40 milliGRAM(s) Oral before breakfast  phenylephrine    Infusion 0.5 MICROgram(s)/kG/Min (6.656 mL/Hr) IV Continuous <Continuous>  piperacillin/tazobactam IVPB. 3.375 Gram(s) IV Intermittent every 12 hours  senna 2 Tablet(s) Oral at bedtime  sevelamer hydrochloride 1600 milliGRAM(s) Oral <User Schedule>  sodium chloride 0.65% Nasal 1 Spray(s) Both Nostrils four times a day  vasopressin Infusion 0.04 Unit(s)/Min (2.4 mL/Hr) IV Continuous <Continuous>    MEDICATIONS  (PRN):  ALBUTerol/ipratropium for Nebulization 3 milliLiter(s) Nebulizer every 6 hours PRN Shortness of Breath and/or Wheezing  Biotene Dry Mouth Oral Rinse 5 milliLiter(s) Swish and Spit four times a day PRN dry mouth  dextrose 40% Gel 15 Gram(s) Oral once PRN Blood Glucose LESS THAN 70 milliGRAM(s)/deciliter  HYDROmorphone  Injectable 0.2 milliGRAM(s) IV Push every 1 hour PRN Moderate Pain (4 - 6)    Vital Signs Last 24 Hrs  T(C): 37.3 (09 Jun 2018 12:06), Max: 37.3 (09 Jun 2018 08:37)  T(F): 99.1 (09 Jun 2018 12:06), Max: 99.1 (09 Jun 2018 08:37)  HR: 119 (09 Jun 2018 12:00) (96 - 132)  BP: 126/65 (08 Jun 2018 13:00) (126/65 - 126/65)  BP(mean): --  RR: 24 (09 Jun 2018 12:00) (19 - 31)  SpO2: 93% (09 Jun 2018 12:00) (70% - 100%)    I&O's Summary    08 Jun 2018 07:01  -  09 Jun 2018 07:00  --------------------------------------------------------  IN: 4913.5 mL / OUT: 4000 mL / NET: 913.5 mL    Physical Exam:   GENERAL: ill appearance   HEENT: Head is normocephalic and atraumatic.    NECK: Supple with (+) elevated JVP.  LUNGS: Clear to auscultation without wheeze and rhonchi.  HEART: S1 and S2 present without murmur.   ABDOMEN: Soft, nontender, and nondistended. No hepatosplenomegaly is noted.   EXTREMITIES: No edema   NEUROLOGIC: agitate 	    Labs:  ABG - ( 09 Jun 2018 01:00 )  pH, Arterial: 7.25  pH, Blood: x     /  pCO2: 39    /  pO2: 118   / HCO3: 17    / Base Excess: -9.2  /  SaO2: 96.7            17, 20, 22                            10.7   20.53 )-----------( 161      ( 09 Jun 2018 03:40 )             33.3                         11.1   17.28 )-----------( 148      ( 08 Jun 2018 13:25 )             33.9                         10.9   17.63 )-----------( 146      ( 08 Jun 2018 03:40 )             34.6                         12.3   16.13 )-----------( 137      ( 07 Jun 2018 05:30 )             36.6                         11.9   14.42 )-----------( 156      ( 06 Jun 2018 03:05 )             37.5     06-09    140  |  99  |  24<H>  ----------------------------<  82  4.2   |  15<L>  |  3.44<H>  06-08    139  |  101  |  18  ----------------------------<  127<H>  3.8   |  19<L>  |  2.69<H>  06-08    131<L>  |  91<L>  |  37<H>  ----------------------------<  219<H>  4.6   |  15<L>  |  5.09<H>  06-07    131<L>  |  91<L>  |  23  ----------------------------<  118<H>  3.8   |  19<L>  |  3.90<H>  06-06    127<L>  |  89<L>  |  40<H>  ----------------------------<  197<H>  4.9   |  17<L>  |  6.09<H>  06-06    126<L>  |  87<L>  |  36<H>  ----------------------------<  253<H>  4.6   |  18<L>  |  5.44<H>    Ca    8.0<L>      09 Jun 2018 03:40  Ca    8.3<L>      08 Jun 2018 13:25  Ca    8.6      08 Jun 2018 03:40  Phos  3.9     06-09  Phos  2.8     06-08  Phos  4.4     06-08  Mg     2.2     06-09  Mg     2.1     06-08  Mg     2.2     06-08    TPro  7.7  /  Alb  2.8<L>  /  TBili  4.5<H>  /  DBili  x   /  AST  173<H>  /  ALT  76<H>  /  AlkPhos  714<H>  06-09  TPro  8.0  /  Alb  3.1<L>  /  TBili  3.5<H>  /  DBili  x   /  AST  72<H>  /  ALT  41  /  AlkPhos  641<H>  06-08  TPro  8.4<H>  /  Alb  3.1<L>  /  TBili  2.6<H>  /  DBili  x   /  AST  60<H>  /  ALT  31  /  AlkPhos  487<H>  06-07  TPro  8.0  /  Alb  3.1<L>  /  TBili  0.5  /  DBili  x   /  AST  20  /  ALT  16  /  AlkPhos  224<H>  06-06    CAPILLARY BLOOD GLUCOSE      POCT Blood Glucose.: 130 mg/dL (09 Jun 2018 11:41)  POCT Blood Glucose.: 108 mg/dL (09 Jun 2018 10:25)  POCT Blood Glucose.: 62 mg/dL (09 Jun 2018 10:04)  POCT Blood Glucose.: 83 mg/dL (09 Jun 2018 09:34)  POCT Blood Glucose.: 46 mg/dL (09 Jun 2018 09:11)  POCT Blood Glucose.: 59 mg/dL (09 Jun 2018 08:59)  POCT Blood Glucose.: 17 mg/dL (09 Jun 2018 08:36)  POCT Blood Glucose.: 20 mg/dL (09 Jun 2018 08:34)  POCT Blood Glucose.: 82 mg/dL (09 Jun 2018 00:42)  POCT Blood Glucose.: 134 mg/dL (08 Jun 2018 17:52)      LIVER FUNCTIONS - ( 09 Jun 2018 03:40 )  Alb: 2.8 g/dL / Pro: 7.7 g/dL / ALK PHOS: 714 u/L / ALT: 76 u/L / AST: 173 u/L / GGT: x           PT/INR - ( 09 Jun 2018 03:40 )   PT: 27.3 SEC;   INR: 2.42          PTT - ( 09 Jun 2018 03:40 )  PTT:43.1 SEC    Studies  Chest X-RAY  < from: Xray Chest 1 View- PORTABLE-Routine (06.09.18 @ 08:52) >  EXAM:  XR CHEST PORTABLE ROUTINE 1V        PROCEDURE DATE:  Jun 9 2018         INTERPRETATION:  EXAMINATION: XR CHEST PORTABLE ROUTINE 1V    CLINICAL INDICATION: Line Placement    TECHNIQUE: Frontal radiograph of the chest was obtained.    COMPARISON: 6/4/2018.    FINDINGS:     Cardiac silhouette is enlarged. Left-sided defibrillator. Right-sided   central venous catheter with tip overlying right atrium. Small right   pleural effusion. Bilateral lung opacities increased compared to the   priorstudy.    IMPRESSION:   Worsening bilateral lung opacities.    < end of copied text >    CT SCAN Chest   < from: CT Chest w/ IV Cont (06.03.18 @ 21:35) >  EXAM:  CT ABDOMEN AND PELVIS IC      EXAM:  CT CHEST IC        PROCEDURE DATE:  [place]  3 2018         INTERPRETATION:  CLINICAL INFORMATION: Sepsis, concern for infection.    COMPARISON: CT scan of the chest from 3/22/2018 and CT scan of the   abdomen and pelvis from 10/30/2017.    PROCEDURE:   CT of the Chest, Abdomen and Pelvis was performed with intravenous   contrast.   Intravenous contrast: 90 ml Omnipaque 350. 10 ml discarded.  Oral contrast: None.  Sagittal and coronal reformats were performed.    FINDINGS:    CHEST:     LUNGS AND LARGE AIRWAYS: Again noted is traction bronchiectasis and   groundglass opacities which are slightly increased in density since   3/22/2018.      PLEURA: There is a small right pleural effusion which has increased since   the previous study.  VESSELS: There is a rounded low density region within the right internal   jugular vein just above a right-sided internal jugular central venous   catheter which could represent a nonocclusive clot or fibrin sheath   (series 2 image 11). Left PICC line tip in the SVC.  HEART: Cardiomegaly. Left chest wall AICD again noted.  MEDIASTINUM AND ASH: There is a 1.3 cm right paratracheal lymph node (2,   15).  CHEST WALL AND LOWER NECK: There is bilateral supraclavicular   lymphadenopathy with lymph node on the right measuring 1.5 x 1.8 cm    ABDOMEN AND PELVIS:    LIVER: Within normal limits.  BILE DUCTS: Normal caliber.  GALLBLADDER: The gallbladder is distended and contains sludge or small   stones. There is gallbladder wall thickening and nonspecific   pericholecystic fluid.  SPLEEN: There is a small wedge-shaped hypodense region posteriorly   consistent with an infarct.  PANCREAS: Within normal limits.  ADRENALS: Within normal limits.  KIDNEYS/URETERS: Bilateral renal atrophy.    BLADDER: Contracted and therefore not well evaluated.  REPRODUCTIVE ORGANS: The prostate gland and seminal vesicles appear   unremarkable.    BOWEL: No bowel obstruction. Appendix is normal.  PERITONEUM: Small ascites.  VESSELS:  Within normal limits.  RETROPERITONEUM: No lymphadenopathy.    ABDOMINAL WALL: Within normal limits.  BONES: Mild bilateral hip arthrosis.    The interstitial lung disease, right pleural effusion and gallbladder   findings were discussed by Dr. Hansen with Dr. Ortiz on 6/3/2018 at 11:07   PM. Additional findings were discussed by Dr. Bishop with Dr. Calhoun on 6/4/2018 at 12:15 PM.    IMPRESSION:   Small right pleural effusion which has increased in size since 3/28/2018.  Interstitial lung disease with ground glass opacities which have   increased slightly in density since 3/22/2018.  Distended gallbladder with wall thickening and sludge or small stones,   with nonspecific pericholecystic fluid. These findings can be seen in the   setting of acute cholecystitis. Ultrasound or HIDA scan is recommended   for further evaluation.  Small splenic infarct.  Rounded low density region within the right internal jugular vein above a   central venous catheter which could represent a nonocclusive clot or   fibrin sheath.    < end of copied text >    Venous Dopplers: LE:   < from: VA Duplex Arterial Lower Ext, Right. (05.25.17 @ 16:48) >    Patient name: Patrice Plascencia  Date of test: 5/25/2017  MR#: [id number]  [facility] #: [id number]    Location: [facility] Physician(s): , Jalen Irvin MD  Interpreted by: Maurilio Bueno MD, [facility], ANDREEA, DENAE  Tech: Pola Hope RVT  Type of Test: LE Arterial: Native  ------------------------------------------------------------------------  Procedure: Real-time grayscale and color Duplex  ultrasonography was used to interrogate the right lower  extremity artery.  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 SFA velocity (cm/sec):  Mid. SFA velocity (cm/sec):  Dist. SFA velocity (cm/sec):  Popliteal velocity (cm/sec):  Ankle-PT velocity (cm/sec):  Ankle-DP velocity (cm/sec):  ------------------------------------------------------------------------  ------------------------------------------------------------------------  WAVEFORM:  ------------------------------------------------------------------------  RIGHT:  CFA: Triphasic  Prox. SFA: Triphasic  Mid. SFA:  Dist. SFA:  Popliteal:  Ankle-PT:  Ankle-DP:  ------------------------------------------------------------------------  Right Findings: No evidence of pseudoaneurysm visualized  in the right inguinal region.  The right external iliac, common femoral, superficial  femoral, and proximal deep femoral arteries are otherwise  patent, and demonstrate normalvelocities with triphasic  waveforms. No hemodynamically significant stenosis.  ------------------------------------------------------------------------  Summary/Impressions:  No pseudoaneurysm.  ------------------------------------------------------------------------  Confirmed on  5/25/2017 - 7:33 PM by Maurilio Bueno MD, FACC,  ANDREEA, RPVI  By signing this report, the attending physician certifies  that he or she has personally supervised and interpreted  the vascular study and has reviewed and oredited and  agrees with the written comments contained within the  report.    < end of copied text >    CT Abdomen  Others  < from: TTE with Doppler (w/Cont) (05.28.18 @ 08:43) >    Patient name: Patrice Plascencia  YOB: 1955   Age: 63 (M)   MR#: [id number]  Study Date: 5/28/2018  Location: CC[facility]onographer: Mery Garcia  Study quality: Technically good  Referring Physician: Dc Luna MD  Blood Pressure: 85/51 mmHg  Height: 180 cm  Weight: 71 kg  BSA: 1.9 m2  ------------------------------------------------------------------------  PROCEDURE: Transthoracic echocardiogram with 2-D, M-Mode  and complete spectral and color flow Doppler.  Verbal consent was obtained for injection of echo contrast.  Following  intravenous injection of contrast, harmonic  imaging was performed. LOT#[id number]  INDICATION: Cardiomyopathy, unspecified (I42.9)  ------------------------------------------------------------------------  DIMENSIONS:  Dimensions:     Normal Values:  LA:     5.2 cm    2.0 - 4.0 cm  Ao:     3.2 cm    2.0 - 3.8 cm  SEPTUM: 1.0 cm    0.6 - 1.2 cm  PWT:    0.9 cm    0.6 - 1.1 cm  LVIDd:  6.0 cm    3.0 - 5.6 cm  LVIDs:  5.6 cm    1.8 - 4.0 cm  Derived Variables:  LVMI: 122 g/m2  RWT: 0.30  Fractional short: 7 %  Ejection Fraction (Teicholtz): 15 %  ------------------------------------------------------------------------  OBSERVATIONS:  Mitral Valve: Normal mitral valve. Mild mitral  regurgitation.  Aortic Root: Normal aortic root.  Aortic Valve: Normal trileaflet aortic valve. Peak left  ventricular outflow tract gradient equals 3.2 mm Hg, mean  gradient is equal to 1 mm Hg, LVOT velocity time integral  equals 16 cm.  Left Atrium: Mildly dilated left atrium.  LA volume index =  40 cc/m2.  Left Ventricle: Severe global left ventricular systolic  dysfunction.   Endocardial visualization enhanced with  intravenous injection of echo contrast (Definity). No left  ventricular thrombus.   Septal motion consistent with right  ventricular overload. Eccentric left ventricular  hypertrophy (dilated left ventricle with normal relative  wall thickness). (DT:449 ms).  Right Heart: Severe right atrial enlargement.   A device  wire is noted in the right heart. Right ventricular  enlargement with decreased right ventricular systolic  function. Normal tricuspid valve.  Severe tricuspid  regurgitation. Normal pulmonic valve. Mild pulmonic  regurgitation.  Pericardium/PleuraSmall pericardial effusion.  Hemodynamic: Estimated right ventricular systolic pressure  equals 56 mm Hg, assuming right atrial pressure equals 10  mm Hg, consistent with moderate pulmonary hypertension.  ------------------------------------------------------------------------  CONCLUSIONS:  1. Mildly dilated left atrium.  LA volume index = 40 cc/m2.  2. Eccentric left ventricular hypertrophy (dilated left  ventricle with normal relative wall thickness).  3. Peak left ventricular outflow tract gradient equals 3.2  mm Hg, mean gradient is equal to 1 mm Hg, LVOT velocity  time integral equals 16 cm. No left ventricular thrombus.  Septal motion consistent with right ventricular overload.  4. Severe right atrial enlargement.   A device wire is  noted in the right heart.  5. Normal tricuspid valve.  Severe tricuspid regurgitation.  6. Estimated pulmonary artery systolic pressure equals 56  mm Hg, assuming right atrial pressure equals 10  mm Hg,  consistent with moderate pulmonary hypertension.  *** Compared with echocardiogram of 10/18/2017, no  significant changes noted.    < end of copied text >

## 2018-06-09 NOTE — PROGRESS NOTE ADULT - PROBLEM SELECTOR PLAN 6
he is on coumadin  6/2 rate controlled  6/3 rate controlled. on AC with high INR
- C/w amiodarone 100 daily  - Check INR daily, dose warfarin daily
- C/w amiodarone 100 daily  - Check INR daily, dose warfarin daily
he is on coumadin
he is on coumadin  6/2 rate controlled
he is on coumadin  6/2 rate controlled  6/3 rate controlled. on AC with high INR  6/4: on AC : monitor INR
on hemodialysis  6/2 HD  6/3 HD. Management per renal  6/4: cont HD per renal: removed 3.5 L
on hemodialysis  6/2 HD  6/3 HD. Management per renal  6/4: cont HD per renal: removed 3.5 L  6/6: on HD
on hemodialysis  6/2 HD  6/3 HD. Management per renal  6/4: cont HD per renal: removed 3.5 L  6/6: on HD  6/7: dialysis per renal :
on hemodialysis  6/2 HD  6/3 HD. Management per renal  6/4: cont HD per renal: removed 3.5 L  6/6: on HD  6/7: dialysis per renal :
on hemodialysis  6/2 HD  6/3 HD. Management per renal  6/4: cont HD per renal: removed 3.5 L  6/6: on HD  6/7: dialysis per renal :  6/8 HD per renal
- C/w Lipitor 80 daily
- C/w amiodarone 100 daily  - Check INR daily, dose warfarin daily
flomax

## 2018-06-09 NOTE — PROGRESS NOTE ADULT - PROBLEM SELECTOR PLAN 1
Is hypotensive today: on vasopressin as well as norepinephrine to map > 65 mm Hg.  6/6: has suspicion of cholecystitis: Pt is refusing for HIDA scan: He is on broad spectrum antibiotics: Defer to surgery and primary tea for the same: Cont supportive treatment: He remans on vasopressors at this time:"  6/7: cont vasopresors as well as dobutamine  6/8: worsoned: on multiple vasopressors as well as dobutaime: It will be hard for him to get netter without the source control: ? likely source ac cholecystisi: Pneumonia is possibility but chest x-ray always been abnormal secondary to pulmonary fibrosis: Moreover it can be fluid in lungs making it look worse: In any case he is on broad spectrum antibiotics: Probably a combined septic as well as cardiogenic shock:  6/9 remained critical. leukocytosis is worsening. hypotensive with multiple pressors.

## 2018-06-09 NOTE — PROGRESS NOTE ADULT - PROBLEM SELECTOR PLAN 3
HD per renal.
- no systemic steroids for now  - appreciate pulm recs
HD per renal.
attempting to optimize w/inc uf w/hd  plan per CCU/cardio
HD per renal.
attempting to optimize w/inc uf w/hd  plan per CCU/cardio
attempting to optimize w/inc uf w/hd  plan per CCU/cardio
: doig same: feels a shade better: can start bipap as mentioned above at night time while sleepin/1: his SOB ismoslty related to his poor LV function: He has ILD , and he has harris tried on steroids before with no improvement in his symptoms and hence they were dced: This was confirmed again with the wife: He was again tried on steroids on last admission with no significant improvement in his breathing: Further his symptoms improve with better management of  his chf / fluid overload status: At this time would cont current treatment without steroids: He can cont on bipap at night and his compliance at home is pretty poor: Explained to him again as well as his sister and wife that he need to be complaint with his bipap machine at home: They understand!   SOB (+), not tachypneic.  on nasal cannular used bipap last night. on Dobutamine drip. leukocytosis (+) slightly elevated temp. Repeat CXR  6/3 no SOB, no tachypnea. on bipap for non compensated respiratory acidosis. 7.27/54/125/22. current bipap order is 12/5 40%. recommend titrate FIO2 to keep O2 sat between 88 to 92%.  : pt is on 50% fio2: Cont to have combined resp as well as metabolic acidosis: uses bipap at night: New ct chest reviewed: has underlying ILD as well as small to moderate effusion on the right side: Which is likely alexys to Cardiomyopathy:  : multifactorial: has ac resp hypercarbic resp acidosis: Use bipap in the daytime as well as in the night time: Try to mantian Ph  > 7.35:  : todays ABG noted: now mostly with some resp acidosic: cont current management: Dialysis for esrd: met acidosis:
: doig same: feels a shade better: can start bipap as mentioned above at night time while sleepin/1: his SOB ismoslty related to his poor LV function: He has ILD , and he has harris tried on steroids before with no improvement in his symptoms and hence they were dced: This was confirmed again with the wife: He was again tried on steroids on last admission with no significant improvement in his breathing: Further his symptoms improve with better management of  his chf / fluid overload status: At this time would cont current treatment without steroids: He can cont on bipap at night and his compliance at home is pretty poor: Explained to him again as well as his sister and wife that he need to be complaint with his bipap machine at home: They understand!   SOB (+), not tachypneic.  on nasal cannular used bipap last night. on Dobutamine drip. leukocytosis (+) slightly elevated temp. Repeat CXR  6/3 no SOB, no tachypnea. on bipap for non compensated respiratory acidosis. 7.27/54/125/22. current bipap order is 12/5 40%. recommend titrate FIO2 to keep O2 sat between 88 to 92%.  : pt is on 50% fio2: Cont to have combined resp as well as metabolic acidosis: uses bipap at night: New ct chest reviewed: has underlying ILD as well as small to moderate effusion on the right side: Which is likely alexys to Cardiomyopathy:  : multifactorial: has ac resp hypercarbic resp acidosis: Use bipap in the daytime as well as in the night time: Try to mantian Ph  > 7.35:  : todays ABG noted: now mostly with some resp acidosic: cont current management: Dialysis for esrd: met acidosis:  : Has ac on chronic hypercarbic respiratory failure: And todays ABG is better:
: doig same: feels a shade better: can start bipap as mentioned above at night time while sleepin/1: his SOB ismoslty related to his poor LV function: He has ILD , and he has harris tried on steroids before with no improvement in his symptoms and hence they were dced: This was confirmed again with the wife: He was again tried on steroids on last admission with no significant improvement in his breathing: Further his symptoms improve with better management of  his chf / fluid overload status: At this time would cont current treatment without steroids: He can cont on bipap at night and his compliance at home is pretty poor: Explained to him again as well as his sister and wife that he need to be complaint with his bipap machine at home: They understand!   SOB (+), not tachypneic.  on nasal cannular used bipap last night. on Dobutamine drip. leukocytosis (+) slightly elevated temp. Repeat CXR  6/3 no SOB, no tachypnea. on bipap for non compensated respiratory acidosis. 7.27/54/125/22. current bipap order is 12/5 40%. recommend titrate FIO2 to keep O2 sat between 88 to 92%.  : pt is on 50% fio2: Cont to have combined resp as well as metabolic acidosis: uses bipap at night: New ct chest reviewed: has underlying ILD as well as small to moderate effusion on the right side: Which is likely alexys to Cardiomyopathy:  : multifactorial: has ac resp hypercarbic resp acidosis: Use bipap in the daytime as well as in the night time: Try to mantian Ph  > 7.35:  : todays ABG noted: now mostly with some resp acidosic: cont current management: Dialysis for esrd: met acidosis:  : Has ac on chronic hypercarbic respiratory failure: And todays ABG is better:  : ABG worse: clinically he is worse: He is on multiple pressors as well as dobutamine for CHF: His EF is pretty low:
: doig same: feels a shade better: can start bipap as mentioned above at night time while sleepin/1: his SOB ismoslty related to his poor LV function: He has ILD , and he has harris tried on steroids before with no improvement in his symptoms and hence they were dced: This was confirmed again with the wife: He was again tried on steroids on last admission with no significant improvement in his breathing: Further his symptoms improve with better management of  his chf / fluid overload status: At this time would cont current treatment without steroids: He can cont on bipap at night and his compliance at home is pretty poor: Explained to him again as well as his sister and wife that he need to be complaint with his bipap machine at home: They understand!   SOB (+), not tachypneic.  on nasal cannular used bipap last night. on Dobutamine drip. leukocytosis (+) slightly elevated temp. Repeat CXR  6/3 no SOB, no tachypnea. on bipap for non compensated respiratory acidosis. 7.27/54/125/22. current bipap order is 12/5 40%. recommend titrate FIO2 to keep O2 sat between 88 to 92%.  : pt is on 50% fio2: Cont to have combined resp as well as metabolic acidosis: uses bipap at night: New ct chest reviewed: has underlying ILD as well as small to moderate effusion on the right side: Which is likely alexys to Cardiomyopathy:  : multifactorial: has ac resp hypercarbic resp acidosis: Use bipap in the daytime as well as in the night time: Try to mantian Ph  > 7.35:  : todays ABG noted: now mostly with some resp acidosic: cont current management: Dialysis for esrd: met acidosis:  : Has ac on chronic hypercarbic respiratory failure: And todays ABG is better:  : ABG worse: clinically he is worse: He is on multiple pressors as well as dobutamine for CHF: His EF is pretty low:   today ABG noted. 7.25/39/118/17 metabolic acidosis. renal is following
HD per renal.
HD per renal. Consistent 3 days of HD w/ 4th day of PUF will be very helpful in keeping him euvolemic as an outpatient.
Tentative plan for IR cholecystostomy tube placement as acute cholecystitis is thought to be underlying etiology of shock.  Continue vasopressors.
attempting to optimize w/inc uf w/hd  plan per CCU/cardio
on hemodialysis
on hemodialysis  6/2 HD
on hemodialysis  6/2 HD  6/3 HD. Management per renal  6/4: cont HD per renal: removed 3.5 L
plan per CCU/cardio  Pt will not tolerate any forms of renal replacement therapy
pt with decline  KPS 40%
secondary to a combination of ILD as well as chf: ? Pt is using cpap/ bipap at home: The compliance report is given to the resident on floor today: Pt has not been complaint with machine at home: He used it only for 6 days and that too, for less then 2 hours. Called his wife to get more information, no answer: Left message to call my office. Pt was discharged on bipap last time : Can restart bipap at 12/5 with 40% fio2 in the night while sleeping and prn in the daytime:  : doig same: feels a shade better: can start bipap as mentioned above at night time while sleepin/1: his SOB ismoslty related to his poor LV function: He has ILD , and he has harris tried on steroids before with no improvement in his symptoms and hence they were dced: This was confirmed again with the wife: He was again tried on steroids on last admission with no significant improvement in his breathing: Further his symptoms improve with better management of  his chf / fluid overload status: At this time would cont current treatment without steroids: He can cont on bipap at night and his compliance at home is pretty poor: Explained to him again as well as his sister and wife that he need to be complaint with his bipap machine at home: They understand!   SOB (+), not tachypneic.  on nasal cannular used bipap last night. on Dobutamine drip. leukocytosis (+) slightly elevated temp. Repeat CXR  6/3 no SOB, no tachypnea. on bipap for non compensated respiratory acidosis. 7.27/54/125/22. current bipap order is 12/5 40%. recommend titrate FIO2 to keep O2 sat between 88 to 92%.  : pt is on 50% fio2: Cont to have combined resp as well as metabolic acidosis: uses bipap at night: New ct chest reviewed: has underlying ILD as well as small to moderate effusion on the right side: Which is likely alexys to Cardiomyopathy:  : multifactorial: has ac resp hypercarbic resp acidosis: Use bipap in the daytime as well as in the night time: Try to mantian Ph  > 7.35:
- no systemic steroids for now  - consider pulm consult
- appreciate pulm recs: pulm does not expect benefit from systemic steroids
continue amio 100mg
on hemodialysis  6/2 HD  6/3 HD. Management per renal
- appreciate pulm recs: pulm does not expect benefit from systemic steroids

## 2018-06-09 NOTE — PROGRESS NOTE ADULT - PROBLEM SELECTOR PLAN 8
Cont BD : I don't think he needs steroids at this time: He also has underlying interstitial lung disease: steroids are not going to help ILD  : keep off steorids: given honeycombing, don't think steroids will work  ; Cont BD : pt has not been wheezin/2 stable. continue bronchodilators.  6/3 keep O2 sat between 88 to 92%  : on nebs prn: can start Symbicort 2 p twice a day  : no parenteral steroids especially when he has ac cholecystitis with shock:   wean oxygen off to keep O2 sat greater than 88%

## 2018-06-09 NOTE — PROGRESS NOTE ADULT - SUBJECTIVE AND OBJECTIVE BOX
CC: F/U for Cholecystitis    Saw/spoke to patient. Patient minimally responsive. Not able to provide history. Family at bedside.    Allergies  No Known Allergies    ANTIMICROBIALS:  piperacillin/tazobactam IVPB. 3.375 every 12 hours    PE:    Vital Signs Last 24 Hrs  T(C): 37.3 (09 Jun 2018 08:37), Max: 37.3 (09 Jun 2018 08:37)  T(F): 99.1 (09 Jun 2018 08:37), Max: 99.1 (09 Jun 2018 08:37)  HR: 124 (09 Jun 2018 08:00) (96 - 143)  BP: 126/65 (08 Jun 2018 13:00) (126/65 - 126/65)  RR: 28 (09 Jun 2018 08:00) (19 - 31)  SpO2: 89% (09 Jun 2018 08:00) (70% - 100%)    Gen: AOx0, NAD, ill appearing  CV: S1+S2 normal, no murmurs, tachycardic  Resp: On bipap, no crackles/wheeze  Abd: Soft, nontender, +BS  Ext: No LE edema, no wounds    LABS:                        10.7   20.53 )-----------( 161      ( 09 Jun 2018 03:40 )             33.3     06-09    140  |  99  |  24<H>  ----------------------------<  82  4.2   |  15<L>  |  3.44<H>    Ca    8.0<L>      09 Jun 2018 03:40  Phos  3.9     06-09  Mg     2.2     06-09    TPro  7.7  /  Alb  2.8<L>  /  TBili  4.5<H>  /  DBili  x   /  AST  173<H>  /  ALT  76<H>  /  AlkPhos  714<H>  06-09    MICROBIOLOGY:    BLOOD  06-05-18  NGTD    BLOOD PERIPHERAL  06-04-18 NGTD    (otherwise reviewed)    RADIOLOGY:    6/3 CT:    IMPRESSION:   Small right pleural effusion which has increased in size since 3/28/2018.  Interstitial lung disease with ground glass opacities which have   increased slightly in density since 3/22/2018.  Distended gallbladder with wall thickening and sludge or small stones,   with nonspecific pericholecystic fluid. These findings can be seen in the   setting of acute cholecystitis. Ultrasound or HIDA scan is recommended   for further evaluation.  Small splenic infarct.  Rounded low density region within the right internal jugular vein above a   central venous catheter which could represent a nonocclusive clot or   fibrin sheath.

## 2018-06-09 NOTE — PROGRESS NOTE ADULT - PROBLEM SELECTOR PLAN 5
previous ct scan noted: has honeycombing like picture, especially at the bases:  He had had biopsy done at another hospital years before following which he was started on steroids , with no improvement which were later dced per his wife :  6/2 no steroid
- C/w Lipitor 80 daily
- C/w Lipitor 80 daily
per cardiology  5/31: on dobutamine as well as HD  6/1: Cont current care:  per CHF team  6/2 On Dobutamine drip. management per CHF team  6/3 on Dobutamine drip. Monitor strict I&O.  keep negative. hyponatremia is worsening and  but also Pt is on Dobutamine!  6/4: developed new effusion on the right side: on dobutamine: Likely the ple effusion is due to chf  6/5: on dobutaime: being followed by HCF team:
per cardiology  5/31: on dobutamine as well as HD  6/1: Cont current care:  per CHF team  6/2 On Dobutamine drip. management per CHF team  6/3 on Dobutamine drip. Monitor strict I&O.  keep negative. hyponatremia is worsening and  but also Pt is on Dobutamine!  6/4: developed new effusion on the right side: on dobutamine: Likely the ple effusion is due to chf  6/5: on dobutaime: being followed by HCF team:  6/6; being followed by heart failure team: cont curetn care:
per cardiology  5/31: on dobutamine as well as HD  6/1: Cont current care:  per CHF team  6/2 On Dobutamine drip. management per CHF team  6/3 on Dobutamine drip. Monitor strict I&O.  keep negative. hyponatremia is worsening and  but also Pt is on Dobutamine!  6/4: developed new effusion on the right side: on dobutamine: Likely the ple effusion is due to chf  6/5: on dobutaime: being followed by HCF team:  6/6; being followed by heart failure team: cont current care:  6/7: cont current care per CHF team
per cardiology  5/31: on dobutamine as well as HD  6/1: Cont current care:  per CHF team  6/2 On Dobutamine drip. management per CHF team  6/3 on Dobutamine drip. Monitor strict I&O.  keep negative. hyponatremia is worsening and  but also Pt is on Dobutamine!  6/4: developed new effusion on the right side: on dobutamine: Likely the ple effusion is due to chf  6/5: on dobutaime: being followed by HCF team:  6/6; being followed by heart failure team: cont current care:  6/7: cont current care per CHF team
per cardiology  5/31: on dobutamine as well as HD  6/1: Cont current care:  per CHF team  6/2 On Dobutamine drip. management per CHF team  6/3 on Dobutamine drip. Monitor strict I&O.  keep negative. hyponatremia is worsening and  but also Pt is on Dobutamine!  6/4: developed new effusion on the right side: on dobutamine: Likely the ple effusion is due to chf  6/5: on dobutaime: being followed by HCF team:  6/6; being followed by heart failure team: cont current care:  6/7: cont current care per CHF team  6/9 on dobutamine. CHF team following
previous ct scan noted: has honeycombing like picture
previous ct scan noted: has honeycombing like picture
previous ct scan noted: has honeycombing like picture, especially at the bases:  He had had biopsy done at another hospital years before following which he was started on steroids , with no improvement which were later dced per his wife :
previous ct scan noted: has honeycombing like picture, especially at the bases:  He had had biopsy done at another hospital years before following which he was started on steroids , with no improvement which were later dced per his wife :  6/2
previous ct scan noted: has honeycombing like picture, especially at the bases:  He had had biopsy done at another hospital years before following which he was started on steroids , with no improvement which were later dced per his wife :  6/2 no steroid  6/4: Pts sister: had a talk with her yesterday: she will try to get the lung biopsy report from [place]:
- review old records to determine basis for COPD diagnosis that appears in EMR  - C/w Monalisa ATC
- C/w Lipitor 80 daily
HD yesterday 4 liters removed, will follow up with renal recs

## 2018-06-09 NOTE — PROGRESS NOTE ADULT - PROBLEM SELECTOR PLAN 4
Cont BD : I don't think he needs steroids at this time: He also has underlying interstitial lung disease: steroids are not going to help ILD  : keep off steorids: given honeycombing, don't think steroids will work  ; Cont BD : pt has not been wheezin/2 stable. continue bronchodilators.  6/3 keep O2 sat between 88 to 92%
- C/w HD qMWF, as well as Saturday prn and add'l fluid-removal PRN  - C/w Carrie-Tiffanie, Sevelamer  - appreciate nephrology recs
- C/w HD qMWF, as well as Saturday prn and add'l fluid-removal PRN  - C/w Carrie-Tiffanie, Sevelamer  - appreciate nephrology recs
Cont BD : I don't think he needs steroids at this time: He also has underlying interstitial lung disease: steroids are not going to help ILD
Cont BD : I don't think he needs steroids at this time: He also has underlying interstitial lung disease: steroids are not going to help ILD  5/31: keep off steorids: given honeycombing, don't think steroids will work
Cont BD : I don't think he needs steroids at this time: He also has underlying interstitial lung disease: steroids are not going to help ILD  5/31: keep off steorids: given honeycombing, don't think steroids will work  6/1; Cont BD : pt has not been wheezing:
Cont BD : I don't think he needs steroids at this time: He also has underlying interstitial lung disease: steroids are not going to help ILD  : keep off steorids: given honeycombing, don't think steroids will work  ; Cont BD : pt has not been wheezin/2 stable. continue bronchodilators.
Cont BD : I don't think he needs steroids at this time: He also has underlying interstitial lung disease: steroids are not going to help ILD  : keep off steorids: given honeycombing, don't think steroids will work  ; Cont BD : pt has not been wheezin/2 stable. continue bronchodilators.  6/3 keep O2 sat between 88 to 92%  : on nebs prn: can start Symbicort 2 p twice a day
HD per renal.
No more fevers last night into this morning.  WBC trending down. Blood cultures thus far are negative.   ID following- on Vanco and Zosyn.   Per chart pt refusing HIDA workup for acute anurag. Told me he does not want surgical intervention if he has an acute anurag.
overall prognosis is poor  goals have been addressed before and pt has always been clear about his wishes to pursue all medical management  spoke with wife who is aware of his wishes and also aware of the overall poor prognosis  pt remains full code  Would consider  not rescucitating pt if pt unable to tolerate HD as the likelihood of recovery would be minimal
previous ct scan noted: has honeycombing like picture, especially at the bases:  He had had biopsy done at another hospital years before following which he was started on steroids , with no improvement which were later dced per his wife :  6/2 no steroid  6/4: Pts sister: had a talk with her yesterday: she will try to get the lung biopsy report from [place]:  6/5: pt says his sister still did not get the results yet!  6/6: cont supportive care: on bipap and oxygen
previous ct scan noted: has honeycombing like picture, especially at the bases:  He had had biopsy done at another hospital years before following which he was started on steroids , with no improvement which were later dced per his wife :  6/2 no steroid  6/4: Pts sister: had a talk with her yesterday: she will try to get the lung biopsy report from [place]:  6/5: pt says his sister still did not get the results yet!  6/6: cont supportive care: on bipap and oxygen  6/7: Cont supportive care:
previous ct scan noted: has honeycombing like picture, especially at the bases:  He had had biopsy done at another hospital years before following which he was started on steroids , with no improvement which were later dced per his wife :  6/2 no steroid  6/4: Pts sister: had a talk with her yesterday: she will try to get the lung biopsy report from [place]:  6/5: pt says his sister still did not get the results yet!  6/6: cont supportive care: on bipap and oxygen  6/7: Cont supportive care:  6/8: cont bipap as well as oxygen : he has hypercarbic acidoses with metabolic acidosis: He is on bipap as well as receiving HD  6/9 supportive care. not a major issue currently
previous ct scan noted: has honeycombing like picture, especially at the bases:  He had had biopsy done at another hospital years before following which he was started on steroids , with no improvement which were later dced per his wife :  6/2 no steroid  6/4: Pts sister: had a talk with her yesterday: she will try to get the lung biopsy report from [place]:  6/5: pt says his sister still did not get the results yet!  6/6: cont supportive care: on bipap and oxygen  6/7: Cont supportive care:  6/8: cont bipap as well as oxygen : he has hypercarbic acidoses with metabolic acidosis: He is on bipap as well as receiving HD
previous ct scan noted: has honeycombing like picture, especially at the bases:  He had had biopsy done at another hospital years before following which he was started on steroids , with no improvement which were later dced per his wife :  6/2 no steroid  6/4: Pts sister: had a talk with her yesterday: she will try to get the lung biopsy report from [place]:  6/5: pt says his sister still did not get the results yet!
- C/w HD qMWF, as well as Saturday prn  - C/w Ava Chairez
- C/w HD qMWF, as well as Saturday prn and add'l fluid-removal PRN  - C/w Carrie-Tiffanie, Sevelamer  - appreciate nephrology recs
continue oxygen as needed

## 2018-06-09 NOTE — PROGRESS NOTE ADULT - PROBLEM SELECTOR PROBLEM 8
Need for prophylactic measure
Hepatitis B vaccination not up to date
Palliative care patient
Hepatitis B vaccination not up to date
COPD (chronic obstructive pulmonary disease)
Need for prophylactic measure
DM (diabetes mellitus)
Hepatitis B vaccination not up to date

## 2018-06-09 NOTE — PROGRESS NOTE ADULT - PROBLEM SELECTOR PROBLEM 2
Cardiogenic shock
CHF (congestive heart failure)
Cardiogenic shock
Hypotension
Cardiogenic shock
Hypotension
Hypotension
CHF (congestive heart failure)
Cardiogenic shock
ESRD (end stage renal disease) on dialysis
Fever
Hypotension
CHF (congestive heart failure)
Cholecystitis, acute
CHF (congestive heart failure)

## 2018-06-09 NOTE — PROGRESS NOTE ADULT - PROBLEM SELECTOR PROBLEM 6
AF (atrial fibrillation)
BPH (benign prostatic hyperplasia)
AF (atrial fibrillation)
ESRD (end stage renal disease) on dialysis
HLD (hyperlipidemia)
AF (atrial fibrillation)

## 2018-06-09 NOTE — PROGRESS NOTE ADULT - PROBLEM SELECTOR PLAN 9
monitor his blood glucose:  not on FS defer to CCU  6/3 FS with sliding scale. episodes of hypoglycemia noted. management defer to CCU team   6/5: blood glucsoe running high: would defer to primary team for the management:  6/8: Blood glucsoe is decently controlled:  6/9 FS with sliding scale

## 2018-06-09 NOTE — DISCHARGE NOTE FOR THE EXPIRED PATIENT - HOSPITAL COURSE
63yM w severe HFrEF (on dobutamine gtt x3 yrs) w [employer] AICD, A-fib (on warfarin), ESRD on HD MWF (+Sat PRN), pulmonary fibrosis (on home O2), on  p/w epistaxis x1 day, also w acute-on-chronic SOB, found to be volume-overloaded despite, on this admission most likely with acute anurag, unable to tolerate a HIDA scan. Patient refusing intervention due to poor prognosis, he refused intubation and bedside parecutaneous drain, family at bedside, patient made a DNR overnight. Patient  on  at 6:13pm. Family at bedside.

## 2018-06-09 NOTE — PROGRESS NOTE ADULT - ASSESSMENT
63 year old with heart failure with hypotnesion (requiring pressors/ inotrophs), leukocytosis and abdominal pain.  His pain localizes to the right side and his imaging raises a concern for a thickened gallbladder.  Not able to tolerate HIDA, but with elevated LFTs and imaging, high suspicion for cholecystitis.  Not responding to abx, likely needs cholecystomy/cholecystectomy, but reportedly cannot tolerate at present time  Poor prognosis  Overall, Leukocytosis, acute cholecystitis  - Continue Zosyn 3.375g q 12  - Continue Vancomycin by level  - Cholecystostomy when feasible    Carlos Manuel Medellin MD  Pager [phone]  After 5pm and on weekends call [phone]

## 2018-06-09 NOTE — PROGRESS NOTE ADULT - PROBLEM SELECTOR PROBLEM 3
ESRD (end stage renal disease) on dialysis
CHF (congestive heart failure)
ESRD (end stage renal disease) on dialysis
Pulmonary fibrosis
CHF (congestive heart failure)
CHF (congestive heart failure)
ESRD (end stage renal disease) on dialysis
CHF (congestive heart failure)
Debility
ESRD (end stage renal disease) on dialysis
Septic shock
Shortness of breath
Chronic atrial fibrillation
Pulmonary fibrosis
Pulmonary fibrosis
ESRD (end stage renal disease) on dialysis
Pulmonary fibrosis

## 2018-06-09 NOTE — PROGRESS NOTE ADULT - PROBLEM SELECTOR PLAN 1
-Heart failure consulted and recommendations are appreciated  -Cont dobutamine 7.5mcg/kg/min  -Patient is now Hypotensive, likely  in setting of sepsis  -cont norepinephrine gtt and vasopressin 0.04, titrate to MAP 65-70 and midodrine 30mg po tid

## 2018-06-09 NOTE — PROGRESS NOTE ADULT - PROBLEM SELECTOR PLAN 8
-Family at bedside, aware of poor prognosis. Dilaudid 0.2mg IVP q1hour ordered PRN for pain and discomfort.

## 2018-06-09 NOTE — PROGRESS NOTE ADULT - SUBJECTIVE AND OBJECTIVE BOX
Patient seen and examined  appears to be in resp distress while on bipap  hypotensive on multiple pressors  family by bedside    No Known Allergies    Hospital Medications:   MEDICATIONS  (STANDING):  amiodarone    Tablet 100 milliGRAM(s) Oral daily  atorvastatin 80 milliGRAM(s) Oral at bedtime  bisacodyl 5 milliGRAM(s) Oral at bedtime  chlorhexidine 4% Liquid 1 Application(s) Topical <User Schedule>  dextrose 10%. 1000 milliLiter(s) (75 mL/Hr) IV Continuous <Continuous>  dextrose 50% Injectable 12.5 Gram(s) IV Push once  dextrose 50% Injectable 25 Gram(s) IV Push once  dextrose 50% Injectable 25 Gram(s) IV Push once  dextrose 50% Injectable 50 milliLiter(s) IV Push once  DOBUTamine Infusion 7.5 MICROgram(s)/kG/Min (15.975 mL/Hr) IV Continuous <Continuous>  docusate sodium 100 milliGRAM(s) Oral two times a day  finasteride 5 milliGRAM(s) Oral daily  insulin lispro (HumaLOG) corrective regimen sliding scale   SubCutaneous three times a day before meals  insulin lispro (HumaLOG) corrective regimen sliding scale   SubCutaneous at bedtime  levothyroxine 75 MICROGram(s) Oral daily  midodrine 30 milliGRAM(s) Oral three times a day  norepinephrine Infusion 1.2 MICROgram(s)/kG/Min (79.875 mL/Hr) IV Continuous <Continuous>  pantoprazole    Tablet 40 milliGRAM(s) Oral before breakfast  phenylephrine    Infusion 0.5 MICROgram(s)/kG/Min (6.656 mL/Hr) IV Continuous <Continuous>  piperacillin/tazobactam IVPB. 3.375 Gram(s) IV Intermittent every 12 hours  senna 2 Tablet(s) Oral at bedtime  sevelamer hydrochloride 1600 milliGRAM(s) Oral <User Schedule>  sodium chloride 0.65% Nasal 1 Spray(s) Both Nostrils four times a day  vasopressin Infusion 0.04 Unit(s)/Min (2.4 mL/Hr) IV Continuous <Continuous>      VITALS:  T(F): 99.1 (06-09-18 @ 12:06), Max: 99.1 (06-09-18 @ 08:37)  HR: 119 (06-09-18 @ 12:00)  BP: --  RR: 24 (06-09-18 @ 12:00)  SpO2: 93% (06-09-18 @ 12:00)  Wt(kg): --    06-08 @ 07:01  -  06-09 @ 07:00  --------------------------------------------------------  IN: 4913.5 mL / OUT: 4000 mL / NET: 913.5 mL        PHYSICAL EXAM:  Constitutional: resp distress- on bipap  Neck: + JVD  Respiratory: b/l rhonchi  Cardiovascular: tachycardic  Gastrointestinal: BS+, soft, NT/ND  Extremities:3+ peripheral edema  Neurologicalobtunded   Vascular Access:+ IJ PC    LABS:  06-09    140  |  99  |  24<H>  ----------------------------<  82  4.2   |  15<L>  |  3.44<H>    Ca    8.0<L>      09 Jun 2018 03:40  Phos  3.9     06-09  Mg     2.2     06-09    TPro  7.7  /  Alb  2.8<L>  /  TBili  4.5<H>  /  DBili      /  AST  173<H>  /  ALT  76<H>  /  AlkPhos  714<H>  06-09    Creatinine Trend: 3.44 <--, 2.69 <--, 5.09 <--, 3.90 <--, 6.09 <--, 5.44 <--, 4.58 <--, 4.06 <--, 6.49 <--, 5.38 <--                        10.7   20.53 )-----------( 161      ( 09 Jun 2018 03:40 )             33.3     Urine Studies:      RADIOLOGY & ADDITIONAL STUDIES:

## 2018-06-09 NOTE — PROGRESS NOTE ADULT - PROBLEM SELECTOR PLAN 2
Acute anurag is the working diagnosis based on diagnostic evidence, patient is refusing percutaneous

## 2018-06-09 NOTE — PROGRESS NOTE ADULT - PROBLEM SELECTOR PLAN 1
Pt with hypotension- SBP in the 60's- on multiple pressors- Has no mental status and on bipap with respiratory  discussed with Family and CCU team- Pt is to UNStable for any form of Renal replacement therapy- will monitor daily if HD can be of use but as of now dialysis ( HD vs CVVHDF) cannot be performed due to hemodynamic instability-  explained to family thoroughly- they understand  pt with grave prognosis- comfort measures appear appropriate  will follow with you

## 2018-06-09 NOTE — PROGRESS NOTE ADULT - ASSESSMENT
63yM w severe HFrEF (on dobutamine gtt x3 yrs) w [employer] AICD, A-fib (on warfarin), ESRD on HD MWF (+Sat PRN), pulmonary fibrosis (on home O2), on  p/w epistaxis x1 day, also w acute-on-chronic SOB, found to be volume-overloaded despite reported med compliance, no dietary changes, and having gotten extra session of HD the day prior to admission. SBP 80s-90s in UEs, so in order to be monitored while undergoing fluid removal by HD/UF, was transferred to CCU.  downtritrated as low as 2.5 w guidance from hemodynamics (Perry-Chris in place -).     Course c/b abdominal pain and nausea, rising WBC, rectal temp >101, hypotension; BCx neg to date. Diff Dx for sepsis includes cholecystitis (favored by R-sided pain+tenderness and CT A/P, disfavored by LFTs wnl, US RUQ w sonographic Altamirano's negative), gut translocation of GI shelley (favored by R-sided pain), pneumonia (favored by worsening CT chest and by worsening resp status).    Pressor requirements stably high, pt very ill-appearing and lethargic today. Best guess of cause of apparent sepsis is acute cholecystitis, per assessment of primary team as well as recs of ID and surgery. However, given his inability to tolerate HIDA 6/6 PM and his vocalized refusal of procedure today, pt seems unlikely to tolerate perc anurag without sedation which in turn would require intubation, from which patient seems unlikely to be able to wean.      # Neuro - lethargy in setting of suspected sepsis --- attempt to treat underlying cause    # ENT - epistaxis, resolved; attachment for home humidification of nasal cannula sent to patient's home  --- continue humidification of nasal cannula, nasal saline spray  --- facilitate patient's getting simple mask to use for supplemental O2 at home    # Cardiac  - Severe bi-ventricular systolic heart failure w AICD in place, on chronic dobutamine gtt 7.5, LVEF 15% this admission.    --- Cont dobutamine 7.5  --- appreciate Heart-Failure recs  --- daily weights: standing weights if possible  --- out of bed to chair, increase activity as tolerated  - Hypotension in setting of sepsis  --- Cont home midodrine 30 TID  --- cont norepinephrine gtt and vasopressin 0.04, titrate to MAP 65-70  -added mily today   - Atrial fibrillation, on home warfarin 3mg QD  --- cont amiodarone 100mg QD  --- check INR QD  ------- if decision is to pursue perc anurag, would require reversal with Prothrombin Complex Concentrate and possibly add'l Vit K   --- cont newly started PPI QD for GI protection while INR supratherapeutic  - Hyperlipidemia --- cont atorvastatin 80 daily  - BP discrepancy between UEs vs LEs  --- BP in lower extremities is significantly higher than in upper extremities, consistent w prior CCU admission; we think that LE BP is likely more accurate than UE BP. F/u [facility] duplex of UEs did not reveal any significant arterial stenosis.   ---- RUE arterial line placed   - RIJ non-occlusive clot vs fibrin sheath associated w Perma-cath, seen on CT 6/3 --- monitor clinically  - Splenic infarct seen on CT 6/3, potentially to atrial fibrillation and recently sub-therapeutic INR --- supportive care, monitor clinically  - MACE risk reduction --- cont ASA    # Pulm  - Hypoxia, likely multifactorial --- c/w nasal cannula during day, BiLevel at night and PRN  - Pulmonary fibrosis  --- per pulm, do not anticipate benefit from steroids based on unsuccessful trial in past  - ALONZO --- BiLevel at night and PRN, patient now compliant, not hypercarbic on recent ABGs even when lethargic and off BiLevel  - Pleural effusion, R-sided --- increased from prior CT, now small-to-moderate    # GI  - Probable cholecystitis -- Abdominal pain, R-sided, achy, associated w tenderness to palpation, CT A/P c/f cholecystitis, US RUQ: Sonographic Altamirano's sign negative  --- pt was unable to tolerate HIDA on   --- appreciate surgery recs: not a surgical candidate  --- appreciate IR recs  -------- still planned for IR-guided percutaneous cholecystostomy tube placement , pending INR <2  --- order simethicone if needed for gas  --- order acetaminophen if needed for pain  --- avoiding Maalox and other aluminum-hydroxide-containing products due to ESRD   - Nausea and vomiting  --- order ondansetron and/or metoclopramide if needed   --- abd xray with non-obstructive gas pattern, no free air.  - Constipation --- cont senna, bisacodyl, docusate; add'l agents PRN    #  - BPH   --- cont finasteride  --- clarify whether pt needs finasteride given that he is anuric    # Renal - ESRD on HD  --- C/w HD qMWF, as well as Saturday prn and add'l fluid-removal PRN  --- C/w Carrie-Tiffanie, Sevelamer  --- appreciate nephrology recs  --- may need removal of tunneled catheter and chronic PICC in the setting of sepsis.    # Endocr  - Diabetes mellitus type 2  --- off insulin for years  --- monitor glucose on BMP and blood gases  - Hypothyroidism  --- cont levothyroxine    # Infectious Disease   - Sepsis (suspected due to rectal fever, hi WBC, low BP): diff dx includes acute cholecystitis, pneumonia, diverticulitis/gut translocation, and/or CLABSI  --- cont vanc by level  --- cont pip/tazo, renally adjusted  --- f/u BCx (NGTD)  --- Procalcitonin elevated, but un-interpretable in setting of ESRD  --- appreciate ID recs  ---Issue is source, control, patient needs IR perc anurag  - HBV vaccination not up to date --- consider HBV vaccination this admission    # VTE ppx: warfarin held in the setting of supra therapeutic INR.   # Dispo: CCU  # GOC: full code, discussed multiple times this admission and in past, including with palliative care. During prior admission pt had two healthcare proxy forms filled out on same day (3/9/2018), one designating sister as primary, the other designating wife as primary. Sister and wife each say their form was the more recent; neither form includes time, only date. Wife's form is the one that was scanned into EMR suggesting but not proving that hers was more recent. Best way forward would appear to be to try to get wife and sister in agreement about medical decision-making. 63yM w severe HFrEF (on dobutamine gtt x3 yrs) w [employer] AICD, A-fib (on warfarin), ESRD on HD MWF (+Sat PRN), pulmonary fibrosis (on home O2), on 5/27 p/w epistaxis x1 day, also w acute-on-chronic SOB, found to be volume-overloaded despite, on this admission most likely with acute anurag, unable to tolerate a HIDA scan. Patient refusing intervention due to poor prognosis, he refused intubation and bedside parecutaneous drain, family at bedside, patient made a DNR overnight.  # Cardiac  - Severe bi-ventricular systolic heart failure w AICD in place, on chronic dobutamine gtt 7.5, LVEF 15% this admission.    --- Cont dobutamine 7.5  --- appreciate Heart-Failure recs  --- daily weights: standing weights if possible  --- out of bed to chair, increase activity as tolerated  - Hypotension in setting of sepsis  --- Cont home midodrine 30 TID  --- cont norepinephrine gtt and vasopressin 0.04, titrate to MAP 65-70  - Atrial fibrillation, on home warfarin 3mg QD  --- cont amiodarone 100mg QD  --- check INR QD  ------- INR 6/5 supratherapeutic to >8 in the setting of no bleeding, despite Vit K PO 5mg this AM  ------- if decision is to pursue perc anurag, would require reversal with Prothrombin Complex Concentrate and possibly add'l Vit K   --- cont newly started PPI QD for GI protection while INR supratherapeutic  - Hyperlipidemia --- cont atorvastatin 80 daily   BP discrepancy between UEs vs LEs  --- BP in lower extremities is significantly higher than in upper extremities, consistent w prior CCU admission; we think that LE BP is likely more accurate than UE BP. F/u [facility] duplex of UEs did not reveal any significant arterial stenosis.   ---- RUE arterial line placed 6/4  - RIJ non-occlusive clot vs fibrin sheath associated w Perma-cath, seen on CT 6/3 --- monitor clinically  - Splenic infarct seen on CT 6/3, potentially to atrial fibrillation and recently sub-therapeutic INR --- supportive care, monitor clinically  - MACE risk reduction --- cont ASA    # Pulm  - Hypoxia, likely multifactorial --- c/w nasal cannula during day, BiLevel at night and PRN  - Pulmonary fibrosis  --- per pulm, do not anticipate benefit from steroids based on unsuccessful trial in past  - ALONZO --- BiLevel at night and PRN, patient now compliant, not hypercarbic on recent ABGs even when lethargic and off BiLevel  - Pleural effusion, R-sided --- increased from prior CT, now small-to-moderate    # GI  - Probable cholecystitis -- Abdominal pain, R-sided, achy, associated w tenderness to palpation, CT A/P c/f cholecystitis, US RUQ: Sonographic Altamirano's sign negative  --- pt was unable to tolerate HIDA on 6/6  --- appreciate surgery recs: not a surgical candidate  --- appreciate IR recs  -------- still planned for IR-guided percutaneous cholecystostomy tube placement 6/7, pending INR <2  --- order simethicone if needed for gas  --- order acetaminophen if needed for pain  --- avoiding Maalox and other aluminum-hydroxide-containing products due to ESRD   - Nausea and vomiting  --- order ondansetron and/or metoclopramide if needed   --- abd xray with non-obstructive gas pattern, no free air.  - Constipation --- cont senna, bisacodyl, docusate; add'l agents PRN    #  - BPH   --- cont finasteride  --- clarify whether pt needs finasteride given that he is anuric    # Renal - ESRD on HD  -- C/w HD qMWF, as well as Saturday prn and add'l fluid-removal PRN  --- C/w Carrie-Tiffanie, Sevelamer  --- appreciate nephrology recs  --- may need removal of tunneled catheter and chronic PICC in the setting of sepsis.    # Endocr  - Diabetes mellitus type 2  --- off insulin for years  --- monitor glucose on BMP and blood gases  - Hypothyroidism  --- cont levothyroxine    # Infectious Disease   - Sepsis (suspected due to rectal fever, hi WBC, low BP): diff dx includes acute cholecystitis, pneumonia, diverticulitis/gut translocation, and/or CLABSI  --- cont vanc by level  --- cont pip/tazo, renally adjusted  --- f/u BCx (NGTD)  --- Procalcitonin elevated, but un-interpretable in setting of ESRD

## 2018-06-09 NOTE — PROGRESS NOTE ADULT - PROBLEM SELECTOR PLAN 7
monitor his blood glucose:  not on FS defer to CCU  6/3 FS with sliding scale
Patient off insulin since last year.   - monitor off insulin
Patient off insulin since last year.   - monitor off insulin
he is on coumadin  6/2 rate controlled  6/3 rate controlled. on AC with high INR  6/4: on AC : monitor INR  6/5: Cont curretn care:
he is on coumadin  6/2 rate controlled  6/3 rate controlled. on AC with high INR  6/4: on AC : monitor INR  6/5: Cont curretn care:
he is on coumadin  6/2 rate controlled  6/3 rate controlled. on AC with high INR  6/4: on AC : monitor INR  6/5: Cont curretn care:  6/7: INR is too high: mamagement per CCU team  6/8:his NR is supratherapeutic: defer to ccu
he is on coumadin  6/2 rate controlled  6/3 rate controlled. on AC with high INR  6/4: on AC : monitor INR  6/5: Cont curretn care:  6/7: INR is too high: mamagement per CCU team  6/8:his NR is supratherapeutic: defer to ccu  6/9 INR is therapeutic 2.42
he is on coumadin  6/2 rate controlled  6/3 rate controlled. on AC with high INR  6/4: on AC : monitor INR  6/5: Cont curretn care:  6/7: INR is too high:mamagement per CCU team
monitor his blood glucose:
monitor his blood glucose:  not on FS defer to CCU
monitor his blood glucose:  not on FS defer to CCU  6/3 FS with sliding scale
- C/w amiodarone 100 daily  - Check INR daily, dose Coumadin daily
Patient off insulin since last year.   - monitor off insulin
glucose low today. D10 initiated. 2 amps D50 given. Will continue to follow hypoglycemia protocol, adjust this to maintain fingerstick greater than 70

## 2018-06-09 NOTE — PROGRESS NOTE ADULT - PROBLEM SELECTOR PROBLEM 5
Interstitial lung disease
HLD (hyperlipidemia)
HLD (hyperlipidemia)
End stage renal disease
CHF (congestive heart failure)
Interstitial lung disease
COPD (chronic obstructive pulmonary disease)
HLD (hyperlipidemia)

## 2018-06-09 NOTE — PROGRESS NOTE ADULT - PROBLEM SELECTOR PROBLEM 7
DM (diabetes mellitus)
DM (diabetes mellitus)
Diabetes
DM (diabetes mellitus)
AF (atrial fibrillation)
DM (diabetes mellitus)
AF (atrial fibrillation)
DM (diabetes mellitus)

## 2018-06-09 NOTE — PROGRESS NOTE ADULT - PROBLEM SELECTOR PROBLEM 1
Acute on chronic systolic heart failure
Acute on chronic systolic heart failure
ESRD (end stage renal disease)
Acute on chronic systolic heart failure
ESRD (end stage renal disease)
ESRD (end stage renal disease)
Acute on chronic systolic congestive heart failure
Acute on chronic systolic heart failure
ESRD (end stage renal disease)
Shock
Shortness of breath
Biventricular heart failure
Shortness of breath

## 2018-06-09 NOTE — PROGRESS NOTE ADULT - SUBJECTIVE AND OBJECTIVE BOX
Date of Admission:    24 hour events:    Vital Signs Last 24 Hrs  T(C): 37.1 (09 Jun 2018 05:00), Max: 37.1 (09 Jun 2018 05:00)  T(F): 98.8 (09 Jun 2018 05:00), Max: 98.8 (09 Jun 2018 05:00)  HR: 119 (09 Jun 2018 07:15) (96 - 143)  BP: 126/65 (08 Jun 2018 13:00) (106/43 - 126/65)  BP(mean): --  RR: 28 (09 Jun 2018 06:00) (17 - 31)  SpO2: 70% (09 Jun 2018 07:15) (70% - 100%)  I&O's Summary    08 Jun 2018 07:01  -  09 Jun 2018 07:00  --------------------------------------------------------  IN: 4913.5 mL / OUT: 4000 mL / NET: 913.5 mL        MEDICATIONS:  amiodarone    Tablet 100 milliGRAM(s) Oral daily  DOBUTamine Infusion 7.5 MICROgram(s)/kG/Min IV Continuous <Continuous>  midodrine 30 milliGRAM(s) Oral three times a day  norepinephrine Infusion 1.2 MICROgram(s)/kG/Min IV Continuous <Continuous>  phenylephrine    Infusion 0.5 MICROgram(s)/kG/Min IV Continuous <Continuous>    piperacillin/tazobactam IVPB. 3.375 Gram(s) IV Intermittent every 12 hours    ALBUTerol/ipratropium for Nebulization 3 milliLiter(s) Nebulizer every 6 hours PRN      bisacodyl 5 milliGRAM(s) Oral at bedtime  docusate sodium 100 milliGRAM(s) Oral two times a day  pantoprazole    Tablet 40 milliGRAM(s) Oral before breakfast  senna 2 Tablet(s) Oral at bedtime    atorvastatin 80 milliGRAM(s) Oral at bedtime  dextrose 40% Gel 15 Gram(s) Oral once PRN  dextrose 50% Injectable 12.5 Gram(s) IV Push once  dextrose 50% Injectable 25 Gram(s) IV Push once  dextrose 50% Injectable 25 Gram(s) IV Push once  finasteride 5 milliGRAM(s) Oral daily  insulin lispro (HumaLOG) corrective regimen sliding scale   SubCutaneous three times a day before meals  insulin lispro (HumaLOG) corrective regimen sliding scale   SubCutaneous at bedtime  levothyroxine 75 MICROGram(s) Oral daily  vasopressin Infusion 0.04 Unit(s)/Min IV Continuous <Continuous>    Biotene Dry Mouth Oral Rinse 5 milliLiter(s) Swish and Spit four times a day PRN  chlorhexidine 4% Liquid 1 Application(s) Topical <User Schedule>  sodium chloride 0.65% Nasal 1 Spray(s) Both Nostrils four times a day      REVIEW OF SYSTEMS:  Complete 10point ROS negative.    PHYSICAL EXAM:  General: NAD  Cardiovascular: Normal S1 S2, No JVD, No murmurs, No edema  Respiratory: Lungs clear to auscultation	  Gastrointestinal:  Soft, Non-tender, + BS	  Skin: warm and dry, No rashes, No ecchymoses, No cyanosis	  Extremities: Normal range of motion, No clubbing, cyanosis or edema  Vascular: Peripheral pulses palpable 2+ bilaterally    LABS:	 	    CBC Full  -  ( 09 Jun 2018 03:40 )  WBC Count : 20.53 K/uL  Hemoglobin : 10.7 g/dL  Hematocrit : 33.3 %  Platelet Count - Automated : 161 K/uL  Mean Cell Volume : 94.6 fL  Mean Cell Hemoglobin : 30.4 pg  Mean Cell Hemoglobin Concentration : 32.1 %  Auto Neutrophil # : 16.61 K/uL  Auto Lymphocyte # : 1.12 K/uL  Auto Monocyte # : 1.84 K/uL  Auto Eosinophil # : 0.06 K/uL  Auto Basophil # : 0.05 K/uL  Auto Neutrophil % : 80.9 %  Auto Lymphocyte % : 5.5 %  Auto Monocyte % : 9.0 %  Auto Eosinophil % : 0.3 %  Auto Basophil % : 0.2 %    06-09    140  |  99  |  24<H>  ----------------------------<  82  4.2   |  15<L>  |  3.44<H>  06-08    139  |  101  |  18  ----------------------------<  127<H>  3.8   |  19<L>  |  2.69<H>    Ca    8.0<L>      09 Jun 2018 03:40  Ca    8.3<L>      08 Jun 2018 13:25  Phos  3.9     06-09  Phos  2.8     06-08  Mg     2.2     06-09  Mg     2.1     06-08    TPro  7.7  /  Alb  2.8<L>  /  TBili  4.5<H>  /  DBili  x   /  AST  173<H>  /  ALT  76<H>  /  AlkPhos  714<H>  06-09  TPro  8.0  /  Alb  3.1<L>  /  TBili  3.5<H>  /  DBili  x   /  AST  72<H>  /  ALT  41  /  AlkPhos  641<H>  06-08      proBNP:   Lipid Profile:   HgA1c:   TSH:       CARDIAC MARKERS:            TELEMETRY: 	    ECG:  	  RADIOLOGY:  ECHO:  OTHER: 	    PREVIOUS DIAGNOSTIC TESTING:    [ ] Echocardiogram:  [ ]  Catheterization:  [ ] Stress Test: Date of Admission:  5/27/18  24 hour events:  Glucose of 17 is in the am, worsening BP, Made a DNR  Vital Signs Last 24 Hrs  T(C): 37.1 (09 Jun 2018 05:00), Max: 37.1 (09 Jun 2018 05:00)  T(F): 98.8 (09 Jun 2018 05:00), Max: 98.8 (09 Jun 2018 05:00)  HR: 119 (09 Jun 2018 07:15) (96 - 143)  BP: 126/65 (08 Jun 2018 13:00) (106/43 - 126/65)  BP(mean): --  RR: 28 (09 Jun 2018 06:00) (17 - 31)  SpO2: 70% (09 Jun 2018 07:15) (70% - 100%)  I&O's Summary    08 Jun 2018 07:01  -  09 Jun 2018 07:00  --------------------------------------------------------  IN: 4913.5 mL / OUT: 4000 mL / NET: 913.5 mL  MEDICATIONS:  amiodarone    Tablet 100 milliGRAM(s) Oral daily  DOBUTamine Infusion 7.5 MICROgram(s)/kG/Min IV Continuous <Continuous>  midodrine 30 milliGRAM(s) Oral three times a day  norepinephrine Infusion 1.2 MICROgram(s)/kG/Min IV Continuous <Continuous>  phenylephrine    Infusion 0.5 MICROgram(s)/kG/Min IV Continuous <Continuous>  piperacillin/tazobactam IVPB. 3.375 Gram(s) IV Intermittent every 12 hours  ALBUTerol/ipratropium for Nebulization 3 milliLiter(s) Nebulizer every 6 hours PRN  bisacodyl 5 milliGRAM(s) Oral at bedtime  docusate sodium 100 milliGRAM(s) Oral two times a day  pantoprazole    Tablet 40 milliGRAM(s) Oral before breakfast  senna 2 Tablet(s) Oral at bedtime  atorvastatin 80 milliGRAM(s) Oral at bedtime  dextrose 40% Gel 15 Gram(s) Oral once PRN  dextrose 50% Injectable 12.5 Gram(s) IV Push once  dextrose 50% Injectable 25 Gram(s) IV Push once  dextrose 50% Injectable 25 Gram(s) IV Push once  finasteride 5 milliGRAM(s) Oral daily  insulin lispro (HumaLOG) corrective regimen sliding scale   SubCutaneous three times a day before meals  insulin lispro (HumaLOG) corrective regimen sliding scale   SubCutaneous at bedtime  levothyroxine 75 MICROGram(s) Oral daily  vasopressin Infusion 0.04 Unit(s)/Min IV Continuous <Continuous>    Biotene Dry Mouth Oral Rinse 5 milliLiter(s) Swish and Spit four times a day PRN  chlorhexidine 4% Liquid 1 Application(s) Topical <User Schedule>  sodium chloride 0.65% Nasal 1 Spray(s) Both Nostrils four times a day      REVIEW OF SYSTEMS:  Unable to assess    PHYSICAL EXAM:  General: in distress, breathing is labored  Lungs: crackles  Cardiac: +S1+S2 irregular rate  GI:softly distended  Ext: BLLE edema  LABS:	 	    CBC Full  -  ( 09 Jun 2018 03:40 )  WBC Count : 20.53 K/uL  Hemoglobin : 10.7 g/dL  Hematocrit : 33.3 %  Platelet Count - Automated : 161 K/uL  Mean Cell Volume : 94.6 fL  Mean Cell Hemoglobin : 30.4 pg  Mean Cell Hemoglobin Concentration : 32.1 %  Auto Neutrophil # : 16.61 K/uL  Auto Lymphocyte # : 1.12 K/uL  Auto Monocyte # : 1.84 K/uL  Auto Eosinophil # : 0.06 K/uL  Auto Basophil # : 0.05 K/uL  Auto Neutrophil % : 80.9 %  Auto Lymphocyte % : 5.5 %  Auto Monocyte % : 9.0 %  Auto Eosinophil % : 0.3 %  Auto Basophil % : 0.2 %    06-09    140  |  99  |  24<H>  ----------------------------<  82  4.2   |  15<L>  |  3.44<H>  06-08    139  |  101  |  18  ----------------------------<  127<H>  3.8   |  19<L>  |  2.69<H>    Ca    8.0<L>      09 Jun 2018 03:40  Ca    8.3<L>      08 Jun 2018 13:25  Phos  3.9     06-09  Phos  2.8     06-08  Mg     2.2     06-09  Mg     2.1     06-08    TPro  7.7  /  Alb  2.8<L>  /  TBili  4.5<H>  /  DBili  x   /  AST  173<H>  /  ALT  76<H>  /  AlkPhos  714<H>  06-09  TPro  8.0  /  Alb  3.1<L>  /  TBili  3.5<H>  /  DBili  x   /  AST  72<H>  /  ALT  41  /  AlkPhos  641<H>  06-08

## 2018-06-09 NOTE — PROGRESS NOTE ADULT - PROBLEM SELECTOR PLAN 10
- DVT: HSQ  - ALONZO: BiLevel at dose prescribed for home  - Hypothyroidism: C/w levothyroxine  - Cardiovascular: C/w ASA  - Constipation: c/w senna and bisacodyl, consider one or more stool softeners  - BPH: C/w finasteride  - GOC: full code, s/p palliative-care c/s this admission and many times in past
- DVT: HSQ  - Hypothyroidism: C/w levothyroxine  - Cardiovascular: C/w ASA  - BPH: C/w finasteride  - GOC: full code, s/p palliative-care c/s this admission and many times in past
6/2 on Coumadin with today INR 4.73  6/3 on Coumadin. Today INR Is 6.16
6/2 on Coumadin with today INR 4.73  6/3 on Coumadin. Today INR Is 6.16  6/8: remains with supra therapeutic INR:
6/2 on Coumadin with today INR 4.73  6/3 on Coumadin. Today INR Is 6.16  6/8: remains with supra therapeutic INR:  6/9 AC with therapeutic INR
- DVT: HSQ  - ALONZO: BiLevel at dose prescribed for home  - Hypothyroidism: C/w levothyroxine  - Cardiovascular: C/w ASA  - Constipation: c/w senna and bisacodyl, consider one or more stool softeners  - BPH: C/w finasteride  - GOC: full code, s/p palliative-care c/s this admission and many times in past
- DVT: HSQ  - Hypothyroidism: C/w levothyroxine  - Cardiovascular: C/w ASA  - BPH: C/w finasteride  - GOC: pt emphatic that he wants to live as long as possible and recalls having spoken to palliative care many times in past, but not overly resistant to speaking w them again

## 2018-06-09 NOTE — PROGRESS NOTE ADULT - PROVIDER SPECIALTY LIST ADULT
Anesthesia
CCU
Cardiology
Heart Failure
Infectious Disease
Intervent Radiology
Nephrology
Palliative Care
Pulmonology
Surgery
CCU
Cardiology
Heart Failure
Nephrology
Pulmonology

## 2018-06-09 NOTE — PROGRESS NOTE ADULT - PROBLEM SELECTOR PROBLEM 4
ESRD (end stage renal disease) on dialysis
COPD (chronic obstructive pulmonary disease)
ESRD (end stage renal disease) on dialysis
Encounter for palliative care
Fever
Interstitial lung disease
Pulmonary fibrosis
ESRD (end stage renal disease) on dialysis
ESRD (end stage renal disease) on dialysis
COPD (chronic obstructive pulmonary disease)
ESRD (end stage renal disease) on dialysis

## 2018-06-10 LAB — BACTERIA BLD CULT: SIGNIFICANT CHANGE UP

## 2018-06-13 LAB — BACTERIA BLD CULT: SIGNIFICANT CHANGE UP

## 2018-07-16 PROBLEM — E11.9 TYPE 2 DIABETES MELLITUS WITHOUT COMPLICATIONS: Chronic | Status: ACTIVE | Noted: 2017-05-09

## 2018-09-18 NOTE — CHART NOTE - NSCHARTNOTEFT_GEN_A_CORE
HIDA scan was aborted as patient refused to continue the further study 2/2 difficulty breathing .He was very anxious and agitated 2/2 dyspnea and neck pain. In ccu he was found very lethargic ,diaphoretic  and with cold extremities. he was placed on bipap and HD was perform. No increase requirement of pressor noted. Strong peripheral pulses

## 2019-01-09 NOTE — PROGRESS NOTE ADULT - PROBLEM SELECTOR PROBLEM 3
Patient called back - reviewed pap, STD, and vaginal pathogen results. Patient will use Monistat OTC. Also encouraged vulvar hygiene that was discussed at her OB visit.   Chronic hypotension

## 2019-01-30 NOTE — ED ADULT NURSE NOTE - CAS EDN DISCHARGE INTERVENTIONS
Seen on CT abdomen.  Seen in previous imaging.  Now enlarging.  Per previous provider documentation, this was discussed with son who stated it was being monitored as OP for several years and that given age he was told cardiac issues preclude her from surgery and she had stated she wouldn't want further intervention regardless  - monitor IV intact

## 2019-02-06 NOTE — PROGRESS NOTE ADULT - PROBLEM SELECTOR PLAN 3
Cont epo to 20k tiw with HD. Trend Hemoglobin.  Psoas hematoma noted.  PLans to resume anticoagulation, as per primary team and cards. no

## 2019-06-13 NOTE — ED PROVIDER NOTE - RESPIRATORY [+], MLM
Pre-visit chart review completed. Pt eligible/ due for pap test.  MAHOGANY message sent to patient informing test due.  Patient had this test done by PCP previously.    Message left for patient to return my call to schedule mammogram.   ORTHOPNEA/SHORTNESS OF BREATH

## 2019-07-20 NOTE — H&P ADULT. - DIAGNOSTIC AND THERAPEUTIC PLAN DISCUSSED WITH
I Called the pt. and confirmed there appt. with Dr. Danielson  at [facility] on 07/22/2019 pt. verbalizedUnderstanding of the prep. have an arrival time of 8:30.   Dr. Irvin

## 2019-10-09 NOTE — ED PROVIDER NOTE - TOBACCO USE
October 9, 2019    Katya Joiner MD  [address] [facility] [address]  [place] [address]     [facility] - Pediatric Plastic Surgery  [address] [place] [address] [place] [address]  Phone: [phone]  Fax: [phone]   Patient: Anastasia Xiong   MR Number: [id number]   YOB: 2020   Date of Visit: 10/9/2019     Dear Dr. Joiner:    [facility]  [phone]  [email or web address]  CC  MD Mai Siddiqui MA  Anastasia Xiong       15 minutes of time, of which greater than fifty percent of the total visit was counseling/coordinating care as documented above, was spent with the patient (AK6 - [id number]).  
Never smoker

## 2020-01-27 NOTE — DISCHARGE NOTE ADULT - FUNCTIONAL SCREEN CURRENT LEVEL: BATHING, MLM
elida from [facility]'s pharmacy called and stated that losartan-hctz is on a national back order can you separate pt med
(0) independent

## 2020-02-29 NOTE — PROGRESS NOTE ADULT - ATTENDING COMMENTS
DISCHARGE He has had progressive hypotension despite addition of midodrine. He remains on vasopressin and norepinephrine. He appears more dyspneic today. He remains volume overloaded. He is amenable to a HIDA scan. It is not clear to me that empiric perc drainage is in line with his goals of care. The discussion will continue. I discussed the plan with his family and with the CCU team.     Please call me with questions at [phone].

## 2020-03-10 NOTE — PATIENT PROFILE ADULT. - FUNCTIONAL SCREEN CURRENT LEVEL: TOILETING, MLM
CC:  Agnes Valerio is here today for Diabetes; Lipids; and Arthritis  Patient is concerned about frequent UTI's. Would like to discuss chest tightness and  laryngitis since December 2019.  Patient fell on 2/29/2020 and has bruising and left shoulder pain  With a lump on left side of neck. She thinks she is having hair loss as a side effect from medication.    Medications: medications verified and updated  Added preferred pharmacy  Refills needed today?  NO    denies Latex allergy or sensitivity  Advanced Directives: discussed; Completed 5/4/2017    Social History     Tobacco Use   Smoking Status Never Smoker   Smokeless Tobacco Never Used         Health Maintenance Due   Topic Date Due   • Medicare Wellness 65+  09/11/2019   • Depression Screening  09/11/2019     Patient is up to date, no discussion needed.  Patient would like communication of their results via:      Patient would like communication of their results via:      Home Phone: [phone] (home)  Okay to leave a message containing results? Yes                          (0) independent

## 2020-03-16 NOTE — ED ADULT TRIAGE NOTE - CHIEF COMPLAINT QUOTE
nose bleed intermittently since last pm. states decreased home o2 to 2l from 3l,states increased diff breathing, denies ch pains. arrives dobutamine infusion .   pt takes coumadin
steady

## 2020-06-29 NOTE — H&P ADULT. - NEGATIVE GENERAL SYMPTOMS
no fever/no chills/no sweating/no weight gain/no anorexia/no weight loss/no fatigue/no malaise
report given to Maricarmen FRAZIER

## 2020-08-05 NOTE — PROGRESS NOTE ADULT - PROBLEM SELECTOR PLAN 3
This office note has been dictated.  Past Medical History:   Diagnosis Date   • Arthritis     lower back   • Family history of diabetes mellitus    • Fibroid 10/14/2015   • Hemorrhoids 2014   • Hypothyroidism 2005    Seeing Dr. EDITH Hayden, now sees Dr. Wise, Endocrinologist   • PMH of 2014    Solitary rectal ulcer syndrome - [place]   • Prediabetes 10/29/2013   • Thyroiditis, unspecified     postpartum       Past Surgical History:   Procedure Laterality Date   • Anal sphincterotomy  2006   • Full rout obste care,vaginal deliv  2004   • Full rout obste care,vaginal deliv  08   • Hemorrhoid surgery  2014    Hemorrhoidectomy   • Insert intrauterine device  2008    paragard    • Intrauterine device insertion  2018    Paraguard- Insertion date- 2018 Removal date 2028, Lot # [id number], EXP: 2022, NDC: 02214-031-45   • Oral surgery procedure  2007    [place] Teeth Extraction   • Pap smear  10/12/2015       Family History   Problem Relation Age of Onset   • Thyroid Mother         Hypothyroid   • Diabetes Father    • Heart Father    • Hypertension Father    • * Brother         Healthy   • Other Maternal Grandfather         Accident   • Diabetes Paternal Grandmother    • Thyroid Paternal Grandmother    • Hypertension Paternal Grandfather    • Diabetes Paternal Grandfather    • * Son         Healthy   • * Son         Healthy     Review of patient's family status indicates:    Mother                         Alive                       Comment: Hypothyroid    Father                         Alive                       Comment: DM, HTN, heart disease    Brother                        Alive                       Comment: Healthy    Maternal Grandfather                               Comment: Accident    Paternal Grandmother                               Comment: DM, hypothyroid    Paternal Grandfather                               Comment: DM,  HTN    Son                            Alive                       Comment: healthy    Son                            Alive                       Comment: healthy    Maternal Grandmother           Alive                       Social History     Socioeconomic History   • Marital status: /Civil Union     Spouse name: Celso Maldonado   • Number of children: 2   • Years of education: 16   • Highest education level: Not on file   Occupational History   • Occupation:      Employer: [employer] ( [employer])   Social Needs   • Financial resource strain: Not on file   • Food insecurity:     Worry: Not on file     Inability: Not on file   • Transportation needs:     Medical: Not on file     Non-medical: Not on file   Tobacco Use   • Smoking status: Never Smoker   • Smokeless tobacco: Never Used   Substance and Sexual Activity   • Alcohol use: Yes     Alcohol/week: 0.0 standard drinks     Comment: rare   • Drug use: No   • Sexual activity: Yes     Partners: Male     Birth control/protection: I.U.D., Condom     Comment: Paragard- Inserted 1/5/2018, Remove 01/2028   Lifestyle   • Physical activity:     Days per week: Not on file     Minutes per session: Not on file   • Stress: Not on file   Relationships   • Social connections:     Talks on phone: Not on file     Gets together: Not on file     Attends Hinduism service: Not on file     Active member of club or organization: Not on file     Attends meetings of clubs or organizations: Not on file     Relationship status: Not on file   • Intimate partner violence:     Fear of current or ex partner: Not on file     Emotionally abused: Not on file     Physically abused: Not on file     Forced sexual activity: Not on file   Other Topics Concern   •  Service Not Asked   • Blood Transfusions Not Asked   • Caffeine Concern Not Asked   • Occupational Exposure Not Asked   • Hobby Hazards Not Asked   • Sleep Concern Not Asked   • Stress Concern Not Asked   •  Weight Concern Not Asked   • Special Diet Not Asked   • Back Care Not Asked   • Exercise Yes     Comment: yoga, cardio twice a week   • Bike Helmet Not Asked   • Seat Belt Not Asked   • Self-Exams Not Asked   Social History Narrative   • Not on file     Ros neg       Cont epo to 20k tiw with HD. Trend Hemoglobin

## 2020-08-26 NOTE — ED PROVIDER NOTE - PROGRESS NOTE DETAILS
Elizabeth Goldberger PGY-1: epistaxis stable but pt w worsening fluid overload and new pleural effusion so will adm to med Ambulatory

## 2020-08-28 NOTE — ED ADULT NURSE NOTE - PAIN RATING/NUMBER SCALE (0-10): ACTIVITY
Anesthesia Start and Stop Event Times     Date Time Event    8/28/2020 1554 Ready for Procedure     1602 Anesthesia Start     1648 Anesthesia Stop        Responsible Staff  08/28/20    Name Role Begin End    Bg Salvador M.D. Anesth 1602 1648        Preop Diagnosis (Free Text):  Pre-op Diagnosis     Lip Laceration        Preop Diagnosis (Codes):    Post op Diagnosis  Complicated laceration of lip      Premium Reason  A. 3PM - 7AM    Comments:                                                                        0

## 2020-09-01 NOTE — PROGRESS NOTE ADULT - PROBLEM SELECTOR PLAN 3
[Normal Growth] : growth [Normal Development] : development  [No Elimination Concerns] : elimination [Continue Regimen] : feeding [No Skin Concerns] : skin [Normal Sleep Pattern] : sleep no real treatment: supportive treatment:  2/17 keep O2 sat greater than 90%  2/18 keep O2 sat greater than 90%  2/19: extensive fibrosis: no real treatment [None] : no medical problems [Anticipatory Guidance Given] : Anticipatory guidance addressed as per the history of present illness section [Physical Growth and Development] : physical growth and development [Social and Academic Competence] : social and academic competence [Emotional Well-Being] : emotional well-being [Risk Reduction] : risk reduction [Violence and Injury Prevention] : violence and injury prevention [No Vaccines] : no vaccines needed [No Medications] : ~He/She~ is not on any medications [Patient] : patient [Parent/Guardian] : Parent/Guardian [Full Activity without restrictions including Physical Education & Athletics] : Full Activity without restrictions including Physical Education & Athletics [I have examined the above-named student and completed the preparticipation physical evaluation. The athlete does not present apparent clinical contraindications to practice and participate in sport(s) as outlined above. A copy of the physical exam is on r] : I have examined the above-named student and completed the preparticipation physical evaluation. The athlete does not present apparent clinical contraindications to practice and participate in sport(s) as outlined above. A copy of the physical exam is on record in my office and can be made available to the school at the request of the parents. If conditions arise after the athlete has been cleared for participation, the physician may rescind the clearance until the problem is resolved and the potential consequences are completely explained to the athlete (and parents/guardians). [] : The components of the vaccine(s) to be administered today are listed in the plan of care. The disease(s) for which the vaccine(s) are intended to prevent and the risks have been discussed with the caretaker.  The risks are also included in the appropriate vaccination information statements which have been provided to the patient's caregiver.  The caregiver has given consent to vaccinate. [FreeTextEntry1] : 13 y/o M- Doing well\par Normal Exam, except for Acne\par Acne facial wash BID/Epiduo at night followed by light moisturizer\par Seasonal Allergies- Xyzal/Flonase- under control\par We discussed PHQ-9- Tommy does not seem to have depression , but does have trouble focusing periodically.  He does not have trouble doing HW and gets good grades.  He gets distracted by outdoor stuff, etc.  He does admit to having Anxiety about school and about doing things that he is not comfortable with.  He gets nervous frequently. He is home a lot by himself, because of parents work schedules. He does not have issues with friends and feels his anxiety is a lot with school.  He seemed to feel more comfortable when doing it virtually for a short time.  We discussed ways to handle anxiety- Yoga/Exercise/Meditation/Journaling/Adult coloring books/Relaxing teas/L-theanine 100 mg BID.  I have made a referral to therapist- Jimena Salas- and patricia mother and Tommy know that she will reach out and both were agreeable to therapy.\par Flu vaccine given\par Form for blood work given\par Discussion regarding alcohol, smoking, drugs and sexual activity.  We discussed the negative effects drugs and alcohol can have on then emotionally, physically, mentally.  Their use can effect how they perform in school and with their extracurricular activities. We discussed how smoking, including e cigarettes and vaping can also have bad effects.    We discussed ways to deal with peer pressure and to not get in a car with someone who has been drinking and/or taking drugs.  We talked about various STIs and safe sex practices.\par I recommended that the patient participates in 60 minutes or more of physical activity a day.  As an older child, I encouraged structured physical activity when possible (ie, participation in team or individual sports, or supervised exercise sessions). I explained that the patient would be more likely to participate consistently in these activities because they would be accountable to a  or leader. I also suggested engaging in a gym or fitness center if possible. \par Next CP in 1 year.

## 2020-09-12 NOTE — PROGRESS NOTE ADULT - PROBLEM SELECTOR PROBLEM 9
Problem: Bowel/Gastric:  Goal: Normal bowel function is maintained or improved  Outcome: PROGRESSING AS EXPECTED     Problem: Knowledge Deficit  Goal: Knowledge of disease process/condition, treatment plan, diagnostic tests, and medications will improve  Outcome: PROGRESSING AS EXPECTED   + Nausea -- > zofran given     Colitis Workup     RUQ U/S Pending   Hypotension

## 2020-09-15 NOTE — CONSULT NOTE ADULT - ATTENDING COMMENTS
Iliopsoas hematoma  -Reverse anticoagulation per cards  -If worsening symptoms/bleeding would consider IR evaluation for possible embolization or vascular eval of prior catherization site  -No general surgery intervention at this time  -Please call with issues 0 = swallows foods/liquids without difficulty

## 2020-09-18 NOTE — DIETITIAN INITIAL EVALUATION ADULT. - CURRENT DIET ORDER MEETS ESTIMATED NUTRIENT REQUIREMENTS
Writer called and spoke with Rosaura's  Joseph and Rosaura's INR appointment has been rescheduled to Monday 9/21/20 at 2:00 pm.  Writer advised Joseph to have Rosaura take 4 mg of Warfarin tonight 9/18/20 and 3 mg on 9/19/20 and 9/20/20.  Joseph verbalized understanding of Warfarin dosing instructions.   Statement Selected

## 2020-12-15 NOTE — PROGRESS NOTE ADULT - SUBJECTIVE AND OBJECTIVE BOX
Ayo Roca is a 56 year old male presenting with sinus pain, pressure and drainage.  Onset 6 days.  States he had virtual visit.  Was told to take mucinex with no relief.  Had negative covid test 4 days ago.    PPE worn by writer: gloves, gown, face shield/goggles and level 3 procedure mask   COVID-19 Screening:    • Does the patient OR patient’s household members have any of the following symptoms?  o Temperature: Fever ?100.0°F or ?37.8°C?  No  o Respiratory symptoms: New or worsening cough, shortness of breath, difficulty breathing, or sore throat? No  o GI symptoms: New onset of nausea, vomiting or diarrhea?  No  o Miscellaneous: New onset of loss of taste or smell, chills, repeated shaking with chills, muscle pain, headache, congestion or runny nose?  Yes, for patient: headache.  • Has the patient or a household member tested positive for COVID-19 in the last 14 days?  No  • Has the patient or a household member been tested for COVID-19 and are waiting for the results?  No        Medications reviewed and updated.  Denies known Latex allergy or symptoms of Latex sensitivity.  Allergies and tobacco reviewed.    Shaggy Suárez MD    Patient would like communication of their results via:        Cell Phone:   Telephone Information:   Mobile [phone]     Okay to leave a message containing results? Yes         SUBJECTIVE: Asked to re evaluate by Dr Irvin. Daughter at bedside. Currently on HD. Off coumadin due to bilateral hematoma of iliopsoas muscle. Weak, on dobutamine.  	  MEDICATIONS:  amiodarone    Tablet 200milliGRAM(s) Oral daily  midodrine 30milliGRAM(s) Oral every 8 hours  DOBUTamine Infusion 7MICROgram(s)/kG/Min IV Continuous <Continuous>  buDESOnide 160 MICROgram(s)/formoterol 4.5 MICROgram(s) Inhaler 2Puff(s) Inhalation two times a day  acetaminophen   Tablet 650milliGRAM(s) Oral every 6 hours PRN  acetaminophen   Tablet. 650milliGRAM(s) Oral once  acetaminophen    Suspension. 650milliGRAM(s) Oral every 6 hours PRN  levETIRAcetam  IVPB 500milliGRAM(s) IV Intermittent daily  levETIRAcetam  IVPB 250milliGRAM(s) IV Intermittent daily  oxyCODONE  5 mG/acetaminophen 325 mG 2Tablet(s) Oral every 6 hours PRN  docusate sodium 100milliGRAM(s) Oral daily  glycerin Suppository - Adult 1Suppository(s) Rectal once  finasteride 5milliGRAM(s) Oral daily  atorvastatin 20milliGRAM(s) Oral at bedtime  levothyroxine 75MICROGram(s) Oral daily  insulin lispro (HumaLOG) corrective regimen sliding scale  SubCutaneous three times a day before meals  insulin lispro (HumaLOG) corrective regimen sliding scale  SubCutaneous at bedtime  dextrose Gel 1Dose(s) Oral once PRN  dextrose 50% Injectable 12.5Gram(s) IV Push once  dextrose 50% Injectable 25Gram(s) IV Push once  dextrose 50% Injectable 25Gram(s) IV Push once  glucagon  Injectable 1milliGRAM(s) IntraMuscular once PRN  dextrose 5%. 1000milliLiter(s) IV Continuous <Continuous>  artificial tears (preservative free) Ophthalmic Solution 1Drop(s) Both EYES three times a day PRN  sodium chloride 0.65% Nasal 1Spray(s) Both Nostrils four times a day PRN  epoetin alba Injectable 55849Pdgb(s) IV Push <User Schedule>      REVIEW OF SYSTEMS:    CONSTITUTIONAL: No fever, weight loss, or fatigue  EYES: No eye pain, visual disturbances, or discharge  NECK: No pain or stiffness  RESPIRATORY: No cough, wheezing, chills or hemoptysis; No Shortness of Breath  CARDIOVASCULAR: No chest pain, palpitations, dizziness, or leg swelling  GASTROINTESTINAL: No abdominal or epigastric pain. No nausea, vomiting, or hematemesis; No diarrhea or constipation. No melena or hematochezia.  GENITOURINARY: No dysuria, frequency, hematuria, or incontinence  NEUROLOGICAL: No headaches, memory loss, loss of strength, numbness, or tremors  SKIN: No itching, burning, rashes, or lesions   LYMPH Nodes: No enlarged glands  MUSCULOSKELETAL: No joint pain or swelling; No muscle, back, or extremity pain  All other review of systems are negative.  	    PHYSICAL EXAM:  T(C): 36.5, Max: 36.9 (06-20 @ 04:40)  HR: 68 (62 - 87)  BP: 146/90 (93/46 - 146/90)  RR: 20 (14 - 20)  SpO2: 100% (95% - 100%)  Wt(kg): --  I&O's Summary    I & Os for current day (as of 20 Jun 2017 11:23)  =============================================  IN: 1489.6 ml / OUT: 0 ml / NET: 1489.6 ml        PHYSICAL EXAM    Appearance: Normal	  HEENT:   Normal oral mucosa, PERRL, EOMI	  NECK: Soft and supple, No LAD, No JVD  Cardiovascular: Regular Rate and Rhythm, Normal S1 S2, No murmurs, No clicks, gallops or rubs  Respiratory: decreased  breath sounds bilaterl  Gastrointestinal:  Soft, Non-tender, + BS	  Skin: No rashes, No ecchymoses, No cyanosis  Neurologic: Non-focal  Extremities: mild bilateral distal edema.    LABS:	 	      CKMB: 4.73 ng/mL (06-19 @ 20:50)                              8.2    7.02  )-----------( 209      ( 20 Jun 2017 07:45 )             27.3     06-20    132<L>  |  93<L>  |  37<H>  ----------------------------<  81  4.4   |  24  |  6.05<H>    Ca    9.3      20 Jun 2017 07:45  Phos  4.5     06-19  Mg     2.1     06-20    TPro  8.0  /  Alb  2.8<L>  /  TBili  1.6<H>  /  DBili  x   /  AST  40  /  ALT  16  /  AlkPhos  180<H>  06-19    proBNP:   Lipid Profile:   HgA1c:   TSH:

## 2021-02-03 NOTE — PROVIDER CONTACT NOTE (CRITICAL VALUE NOTIFICATION) - BACKGROUND
[id number] [facility]  [address] [facility] [place] [address]  Phone: [phone]  Fax: [phone]        ADDENDUM:      Care of this patient was assumed from Dr. Pam Cordova. The patient was seen for Fatigue, Shortness of Breath (progrssively worsening since Dx w/ Covid on 1/28), and Dehydration (pt denies emesis/diarhhea but states he feels dehydrated/dry mouth)  . The patient's initial evaluation and plan have been discussed with the prior provider who initially evaluated the patient. Nursing Notes, Past Medical Hx, Past Surgical Hx, Allergies, were all reviewed. PAST MEDICAL HISTORY    has a past medical history of Acute pharyngitis, Cervical radiculopathy, Dental crowns present, Fatty liver, Gout, HLD (hyperlipidemia), Hyperglycemia, Hyperlipidemia, Hypertension, Ingrowing nail, Ingrown toenail, Kidney stone, Kidney stones, Metabolic syndrome, TIFFANY (obstructive sleep apnea), Pain in left foot, Precancerous skin lesion, Prediabetes, and Wears glasses. SURGICAL HISTORY      has a past surgical history that includes Tonsillectomy; Vasectomy; other surgical history (Left, 12/22/2017); pr removal of nail bed (Left, 12/22/2017); Colonoscopy (2012); Dental surgery (2019); Colonoscopy (9/9/2020); Upper gastrointestinal endoscopy (9/9/2020); and Upper gastrointestinal endoscopy (9/9/2020).     CURRENT MEDICATIONS       Discharge Medication List as of 2/2/2021  6:17 PM      CONTINUE these medications which have NOT CHANGED    Details   febuxostat (ULORIC) 40 MG TABS tablet TAKE 1 TABLET BY MOUTH EVERY DAY, Disp-90 tablet, R-0Normal      lisinopril (PRINIVIL;ZESTRIL) 10 MG tablet Take 1 tablet by mouth daily, Disp-90 tablet, R-0Normal      metFORMIN (GLUCOPHAGE) 500 MG tablet TAKE 1 TABLET BY MOUTH TWO TIMES A DAY WITH MORNING AND EVENING MEALS, Disp-180 tablet, R-0Normal      fenofibrate (TRICOR) 145 MG tablet TAKE 1 TABLET BY MOUTH ONE TIME A DAY, Disp-90 tablet, R-0Normal             ALLERGIES is allergic to allopurinol. Diagnostic Results     LABS:   Results for orders placed or performed during the hospital encounter of 02/02/21   CBC Auto Differential   Result Value Ref Range    WBC 3.5 3.5 - 11.0 k/uL    RBC 4.48 (L) 4.5 - 5.9 m/uL    Hemoglobin 12.4 (L) 13.5 - 17.5 g/dL    Hematocrit 37.1 (L) 41 - 53 %    MCV 82.8 80 - 100 fL    MCH 27.8 26 - 34 pg    MCHC 33.5 31 - 37 g/dL    RDW 14.1 12.5 - 15.4 %    Platelets 396 633 - 994 k/uL    MPV 8.2 6.0 - 12.0 fL    NRBC Automated NOT REPORTED per 100 WBC    Differential Type NOT REPORTED     Seg Neutrophils 79 (H) 36 - 66 %    Lymphocytes 16 (L) 24 - 44 %    Monocytes 5 2 - 11 %    Eosinophils % 0 (L) 1 - 4 %    Basophils 0 0 - 2 %    Immature Granulocytes NOT REPORTED 0 %    Segs Absolute 2.80 1.8 - 7.7 k/uL    Absolute Lymph # 0.60 (L) 1.0 - 4.8 k/uL    Absolute Mono # 0.20 0.1 - 1.2 k/uL    Absolute Eos # 0.00 0.0 - 0.4 k/uL    Basophils Absolute 0.00 0.0 - 0.2 k/uL    Absolute Immature Granulocyte NOT REPORTED 0.00 - 0.30 k/uL    WBC Morphology NOT REPORTED     RBC Morphology NOT REPORTED     Platelet Estimate NOT REPORTED    Basic Metabolic Panel   Result Value Ref Range    Glucose 106 (H) 70 - 99 mg/dL    BUN 21 (H) 6 - 20 mg/dL    CREATININE 1.41 (H) 0.70 - 1.20 mg/dL    Bun/Cre Ratio NOT REPORTED 9 - 20    Calcium 8.4 (L) 8.6 - 10.4 mg/dL    Sodium 138 135 - 144 mmol/L    Potassium 4.2 3.7 - 5.3 mmol/L    Chloride 99 98 - 107 mmol/L    CO2 27 20 - 31 mmol/L    Anion Gap 12 9 - 17 mmol/L    GFR Non-African American 52 (L) >60 mL/min    GFR African American >60 >60 mL/min    GFR Comment          GFR Staging NOT REPORTED    Lactic Acid   Result Value Ref Range    Lactic Acid 1.2 0.5 - 2.2 mmol/L       RADIOLOGY:  XR CHEST PORTABLE   Final Result   Bilateral multifocal pneumonia versus atypical viral pneumonitis. Recommend   imaging follow-up to resolution.              RECENT VITALS:  BP: 113/74, Temp: 100.3 °F (37.9 °C), Pulse: 88, Resp: 16 ED Course     The patient was given the following medications:  Orders Placed This Encounter   Medications    0.9 % sodium chloride bolus    insulin regular (HUMULIN R;NOVOLIN R) 100 UNIT/ML injection     ALBA ESCALANTE: emani override       Medical Decision Making           The patient is a 62year old male who presents for evaluation of shortness of breath. He tested positive for COVID on 1/28/21 and was found to be hypoxic. CXR shows signs of viral COVID pneumonia. He also has slight EYAD with creatinine of 1.4 and was treated with IV fluids. Labs are otherwise unremarkable. At time of sign out, the patient is awaiting a home oxygen tank. Plan to discharge home on oxygen with close outpatient follow up. Disposition     FINAL IMPRESSION      1. Hypoxia    2.  Pneumonia due to COVID-19 virus          DISPOSITION/PLAN   DISPOSITION Decision To Discharge 02/02/2021 06:16:14 PM      CONDITION ON DISPOSITION:   Stable    PATIENT REFERRED TO:  Samson Webster MD  [address] [place] [address] [address]  [phone]    In 1 week  As needed      DISCHARGE MEDICATIONS:  Discharge Medication List as of 2/2/2021  6:17 PM                (Please note that portions of this note were completed with a voice recognition program.  Efforts were made to edit the dictations but occasionally words are mis-transcribed.)    Emre Witt DO  Emergency Medicine Physician                 Emre Witt DO  02/02/21 [id number] pt admitted syncope and collapse, pt has history of elevated INR

## 2021-04-07 NOTE — PROGRESS NOTE ADULT - SUBJECTIVE AND OBJECTIVE BOX
CHIEF COMPLAINT:pt with out new changes    	            PAST MEDICAL & SURGICAL HISTORY:  Chronic hypotension  AF (atrial fibrillation)  COPD (chronic obstructive pulmonary disease): 4L home O2  HLD (hyperlipidemia)  DM (diabetes mellitus)  ESRD (end stage renal disease) on dialysis  BPH (benign prostatic hypertrophy)  Myocardial infarction: 10/2011  Chronic renal insufficiency  Gout  Dyslipidemia  Diabetes mellitus  CHF (congestive heart failure)  AICD (automatic cardioverter/defibrillator) present: [employer] - placed 9/11/09  H/O coronary angiogram            REVIEW OF SYSTEMS:  CONSTITUTIONAL: No fever, weight loss, or fatigue  EYES: No eye pain, visual disturbances, or discharge  NECK: No pain or stiffness  RESPIRATORY: No cough, wheezing, chills or hemoptysis;sob approx same   CARDIOVASCULAR: No chest pain, palpitations, passing out, dizziness, or leg swelling  GASTROINTESTINAL: No abdominal or epigastric pain. No nausea, vomiting, or hematemesis; No diarrhea or constipation. No melena or hematochezia.  GENITOURINARY: No dysuria, frequency, hematuria, or incontinence  NEUROLOGICAL: No headaches, memory loss, loss of strength, numbness, or tremors      Medications:  MEDICATIONS  (STANDING):  finasteride 5milliGRAM(s) Oral daily  amiodarone    Tablet 200milliGRAM(s) Oral daily  atorvastatin 20milliGRAM(s) Oral at bedtime  docusate sodium 100milliGRAM(s) Oral daily  midodrine 30milliGRAM(s) Oral every 8 hours  sevelamer hydrochloride 800milliGRAM(s) Oral three times a day with meals  levothyroxine 75MICROGram(s) Oral daily  insulin lispro (HumaLOG) corrective regimen sliding scale  SubCutaneous three times a day before meals  insulin lispro (HumaLOG) corrective regimen sliding scale  SubCutaneous at bedtime  dextrose 5%. 1000milliLiter(s) IV Continuous <Continuous>  dextrose 50% Injectable 12.5Gram(s) IV Push once  dextrose 50% Injectable 25Gram(s) IV Push once  dextrose 50% Injectable 25Gram(s) IV Push once  DOBUTamine Infusion 5.002MICROgram(s)/kG/Min IV Continuous <Continuous>    MEDICATIONS  (PRN):  dextrose Gel 1Dose(s) Oral once PRN Blood Glucose LESS THAN 70 milliGRAM(s)/deciliter  glucagon  Injectable 1milliGRAM(s) IntraMuscular once PRN Glucose LESS THAN 70 milligrams/deciliter    	    PHYSICAL EXAM:  T(C): 36.4, Max: 36.6 (05-18 @ 15:40)  HR: 83 (77 - 89)  BP: 99/59 (97/68 - 121/77)  RR: 18 (17 - 18)  SpO2: 97% (95% - 100%)  Wt(kg): --  I&O's Summary    I & Os for current day (as of 19 May 2017 10:19)  =============================================  IN: 500 ml / OUT: 3600 ml / NET: -3100 ml      Appearance: Normal	  HEENT:   Normal oral mucosa, PERRL, EOMI	  Lymphatic: No lymphadenopathy  Cardiovascular: Normal S1 S2, No JVD, No murmurs, No edema  Respiratory: dec bs	  Psychiatry: A & O x 3, Mood & affect appropriate  Gastrointestinal:  Soft, Non-tender, + BS	  Skin: No rashes, No ecchymoses, No cyanosis	  Neurologic: Non-focal  Extremities: Normal range of motion, No clubbing, cyanosis + edema   Vascular: Peripheral pulses palpable                                     10.6   5.13  )-----------( 149      ( 19 May 2017 06:30 )             35.2     05-19    134<L>  |  96<L>  |  27<H>  ----------------------------<  97  4.3   |  25  |  5.17<H>    Ca    9.1      19 May 2017 06:30  Phos  3.7     05-19  Mg     2.1     05-19      proBNP:   Lipid Profile:   HgA1c:   TSH: done

## 2021-05-25 NOTE — PROGRESS NOTE ADULT - ASSESSMENT
62 y/o M PMHx significant for severe CHF, on chronic dobutamine gtt at home x3 years (follows up with Dr. Davis), AFib on coumadin, AICD, ESRD on HD MWF (plus add'l session on Saturday prn), pulmonary fibrosis, presents to the ED with chief complaint of epistaxis that started yesterday. More significantly, patient with chronic SOB that is worsened from baseline, likely multifactorial d/t volume overload likely 2/2 ESRD and CHF. Patient has been on dobutamine gtt, but may need titratable dosing. Will consult CCU. Statement Selected

## 2021-07-19 NOTE — PROGRESS NOTE ADULT - SUBJECTIVE AND OBJECTIVE BOX
NEPHROLOGY OUTPATIENT PROGRESS NOTE   Alessandro Langley [de-identified] y o  male MRN: [id number]  DATE: 7/19/2021  Reason for visit:   Chief Complaint   Patient presents with    Follow-up    Chronic Kidney Disease       ASSESSMENT and PLAN:  Chronic kidney disease stage 4  -New baseline creatinine likely 1 9-2 2  -had acute kidney injury in February 2021 when he was admitted, was suspected to be due to prerenal azotemia, possible ATN  Renal function improved during the hospital stay to creatinine 1 9  Previous baseline creatinine was around 1 2-1 6 but since February after episode of acute kidney injury has been around 1 9-2 2  EGFR below 30, so calling it as chronic kidney disease stage 4  -patient had loopogram in February 2021 which was found to be normal  -chronic kidney disease likely due to diabetes mellitus type 2 causing diabetic nephropathy, hypertensive nephrosclerosis, age-related nephron loss, episode of acute kidney injury/ATN  -PTH was 27 8  -refer to Kidney education class  -avoid nephrotoxins  -avoid hypotension  -check BMP in 2 weeks, 2 months and follow-up in 3 months with repeat labs  Okay to follow-up with advanced practitioner done  Persistent proteinuria  -last upc ratio 0 67  -likely due to hypertensive nephrosclerosis and diabetic nephropathy  -previously was on irbesartan which was stopped during episode of acute kidney injury in February 2021  -blood pressure is currently well controlled, would not start on ACE inhibitor/ARB but if proteinuria worsening in future and if renal function is stable, may consider low-dose of ARB  Discussed with patient and he verbalized understanding  Metabolic acidosis  - bicarbonate level 17  - started on 1300 mg bid of sodium bicarbonate    -monitor bicarb level on blood work in 2 weeks, 2 months and in 3 months  Would adjust the dose of sodium bicarbonate tablets according    Use of sodium bicarbonate level has shown to decrease the rate of renal function Patient continues to complain of dyspnea  BP 99/63 HR 81  O2 Sat 96% on 4LNC  bilateral crackles  RR 1/6 systolic  trace edema    INR 1.71    Imp: 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  He was treated with MTX in the past without benefit.  He remains on chronic O2.    Rec: Dose Coumadin to achieve INR 2-3.0  Continue Dobutamine and Amiodarone.  HD for fluid removal  Overall prognosis is guarded but he is presently at his baseline decline in CKD population    Hypomagnesemia  -blood work from 07/16 showed magnesium level improving to 1 2 from previous level of 1 0 on July 7   -was started on magnesium oxide 400 mg twice daily after blood work from July 7   -continue magnesium oxide 400 mg twice daily, checking magnesium level in 2 weeks    Primary Hypertension with chronic kidney disease stage 4:   -Current medication:  Metoprolol     -Current blood pressure:  Stable and is at goal  -Plan:    ·  Continue metoprolol at current dose, recommend home monitoring of pulse rate and to call Cardiology if persistently less than 55  · Patient previously on irbesartan prior to acute kidney injury in February 2021 but currently blood pressure well controlled, no need to add another medication  -Recommend 2 g sodium diet  -Recommend daily exercise and weight loss  -Discussed home monitoring of BP and maintaining a log of blood pressure   -Recommend goal BP less than 130/80  Anemia: improving to 11 8  Continue to monitor, also checking iron panel before the next office visit    Bladder cancer   -reviewed urology note- status post BCG and status post Keytruda  Status post radical cysto prostatectomy with ileal conduit creation in October 2020   -status post drainage of anterior wall abscess  -noted Urology plan to follow-up in 6 months with repeat CT abdomen       History of right pelvic abscess: status post IR guided drainage and drain placement-removed on 03/05/2021  -antibiotics course completed  Patient Instructions   Renal function is stable    You have chronic kidney disease stage 4   -Avoid NSAIDs like Ibuprofen/Motrin, Naproxen/Aleve, Celebrex, meloxicam/Mobic, Diclofenac and other NSAIDs   -Okay to take Acetaminophen/Tylenol if you do not have any liver problems  -Avoid IV contrast used for CT scan and cardiac catheterization     -If plan for any study with IV contrast, please let me know so we could hydrate with fluids before and after IV contrast  -Dosage  of certain medications may need to be adjusted depending on Kidney function  Also discussed about risk of omeprazole causing worsening renal function    Recommend stopping metformin, need to discuss with PCP regarding other oral hypoglycemic agent  Recommend goal A1c less than 7 0  Blood pressure is stable, continue current medication  Refer to Kidney education class Check blood work in 2 weeks, 2 months and follow-up in 3 months with repeat labs      Diagnoses and all orders for this visit:    Stage 4 chronic kidney disease ([facility] [address] )  -     Basic metabolic panel; Future  -     Magnesium; Future  -     Basic metabolic panel; Future  -     Magnesium; Future  -     Basic metabolic panel; Future  -     CBC; Future  -     Magnesium; Future  -     Phosphorus; Future  -     Protein / creatinine ratio, urine; Future  -     Ambulatory referral to ckd education program; Future    Hypomagnesemia  -     Magnesium; Future  -     Magnesium; Future  -     Magnesium; Future    Metabolic acidosis  -     Basic metabolic panel; Future  -     sodium bicarbonate 650 mg tablet; Take 2 tablets (1,300 mg total) by mouth 2 (two) times a day  -     Basic metabolic panel; Future    Persistent proteinuria  -     Protein / creatinine ratio, urine; Future    Benign essential hypertension    Anemia, unspecified type  -     CBC; Future  -     Iron Panel (Includes Ferritin, Iron Sat%, Iron, and TIBC); Future            SUBJECTIVE / HPI:  Barrett Christopher is a [de-identified] y o   male with past history of CAD status post CABG, bladder cancer status post ileal conduit in October 2020, diabetes mellitus type 2, hypertension presenting for follow-up of chronic kidney disease  Patient was 1st seen by Nephrology during hospital admission and had acute kidney injury at that time  Was thought to be prerenal, admission creatinine was 3 5 and improved to 1 9 in February 2021    During last office visit with advanced practitioner creatinine was around 2 0  Previous baseline creatinine was around 1 2-1 6  -during hospital admission, patient had pelvic abscess, underwent IR drainage and drain placement which was removed in March 5, 2021  Was previously seen by advanced practitioner  Reviewed blood work from [email or web address], renal function recently since February 2021 to July 2021 was reviewed, creatinine has been around 1 9-2 2  Was elevated to 2 4 on July 7 but then improved  Also was found to have low bicarb of 17 and magnesium level of 1 2  Was started on oral magnesium after the last blood work    C/o constipation and diarrhea on and off   Reviewed last PCP, Cardiology and Urology note    REVIEW OF SYSTEMS:    Review of Systems   Constitutional: Negative for chills and fever  HENT: Negative for ear pain and sore throat  Eyes: Negative for pain and visual disturbance  Respiratory: Negative for cough and shortness of breath  Cardiovascular: Negative for chest pain and palpitations  Gastrointestinal: Negative for abdominal pain and vomiting  Genitourinary: Negative for dysuria and hematuria  Has ileal conduit   Musculoskeletal: Negative for arthralgias and back pain  Skin: Negative for color change and rash  Neurological: Negative for seizures and syncope  All other systems reviewed and are negative  More than 10 point review of systems were obtained and discussed in length with the patient  Complete review of systems were negative / unremarkable except mentioned above         PAST MEDICAL HISTORY:  Past Medical History:   Diagnosis Date    Acute kidney injury ([facility] [address] )     Arthritis     Hands    Wright esophagus     BPH with obstruction/lower urinary tract symptoms     CAD (coronary artery disease)     Last assessed 09/16/15    Cancer [facility])     bladder    Cataract, acquired     Last assessed 10/10/17    Coronary artery disease     Diabetes mellitus ([facility] [address] )     NIDDM    Diabetic neuropathy ([facility] [address] ) Feet    Enlarged prostate with lower urinary tract symptoms (LUTS)     Last assessed 10/10/17    Erectile dysfunction     GERD (gastroesophageal reflux disease)     Last assessed 10/10/17    Hemoptysis     Last assessed 03/14/16    Hypercholesterolemia     Last assessed 10/10/17    Hypertension     Last assessed 10/10/17    Macular degeneration     Right eye is particularly affected    Myocardial infarction ([facility] [address] ) 1998    OAB (overactive bladder)     Testicular hypofunction     Testicular hypogonadism     Last assessed 10/10/17    Tinnitus     Umbilical hernia     Last assessed 06/18/14    Urge incontinence of urine     Wears glasses        PAST SURGICAL HISTORY:  Past Surgical History:   Procedure Laterality Date    COLONOSCOPY      CORONARY ARTERY BYPASS GRAFT  07/16/2014    ABG x 4 LIMA to LAD,SVG to diagnoal 2 SVG to OM-1, SVG to PDA, resection of partial plerual mass    CT GUIDED PERC DRAINAGE CATHETER PLACEMENT  2/19/2021    CYSTOSCOPY  2013    ESOPHAGOGASTRODUODENOSCOPY      FL RETROGRADE PYELOGRAM  12/20/2018    FL RETROGRADE PYELOGRAM  12/5/2019    INGUINAL HERNIA REPAIR Bilateral 2015    IR DRAINAGE TUBE CHECK AND/OR REMOVAL  3/5/2021    PHOTODYNAMIC THERAPY      For Barretts esophagus    MD COLONOSCOPY FLX DX W/COLLJ SPEC WHEN PFRMD N/A 4/25/2016    Procedure: COLONOSCOPY;  Surgeon: Ankur Wagoner MD;  Location:  GI LAB; Service: Gastroenterology    MD CYSTOURETHROSCOPY,BIOPSY N/A 9/10/2020    Procedure: CYSTOSCOPY, COLLECTION OF LEFT KIDNEY CYTOLOGY, BILATERAL RETROGRADE PYELOGRAM, BLADDER WALL BIOPSIES  AND [address], RANDOM BLADDER TUMOR BIOPSIES;  Surgeon: Emma Fischer MD;  Location: [address] [place] [address] MAIN OR;  Service: Urology    MD CYSTOURETHROSCOPY,FULGUR 2-5 CM LESN N/A 4/16/2020    Procedure: CYSTOSCOPY, TRANSURETHRAL RESECTION OF BLADDER TUMOR (TURBT);   Surgeon: Emma Fischer MD;  [facility];  Service: Urology    MD CYSTOURETHROSCOPY,FULGUR <0 5 CM LESN N/A 2019    Procedure: CYSTO W/TURBT AND TRANSURETHRAL PROSTATE BIOPSY AND OPENING OF BLADDER NECK CONTRACTURE, B/L Retrograde pyelogram;  Surgeon: Rashida Salazar MD;  [facility];  Service: Urology    TONSILLECTOMY      TRANSURETHRAL RESECTION OF PROSTATE N/A 2018    Procedure: CYSTO, PHOTO SELECTIVE VAPORIZATION OF PROSTATE, B/L RETROGRADE PYELOGRAM, TURBT;  Surgeon: Rashida Salazar MD;  [facility];  Service: Urology    UMBILICAL HERNIA REPAIR      UPPER GASTROINTESTINAL ENDOSCOPY         SOCIAL HISTORY:  Social History     Substance and Sexual Activity   Alcohol Use Yes    Alcohol/week: 2 0 standard drinks    Types: 1 Glasses of wine, 1 Cans of beer per week    Comment: 1 drink daily- a mixed drink or wine Last 2020     Social History     Substance and Sexual Activity   Drug Use No     Social History     Tobacco Use   Smoking Status Former Smoker    Packs/day:  00    Years:     Pack years:     Types: Cigarettes    Quit date: 0    Years since quittin 5   Smokeless Tobacco Never Used       FAMILY HISTORY:  Family History   Problem Relation Age of Onset    Cancer Mother     Other Mother         Digestive System Complications    Diabetes Father     Heart disease Father     Hypertension Father     Coronary artery disease Father     Hyperlipidemia Father        MEDICATIONS:    Current Outpatient Medications:     acetaminophen (TYLENOL) 500 mg tablet, Take 1,000 mg by mouth every 6 (six) hours as needed for mild pain or moderate pain , Disp: , Rfl:     aspirin 81 mg chewable tablet, Chew 81 mg daily, Disp: , Rfl:     atorvastatin (LIPITOR) 40 mg tablet, Take 1 tablet (40 mg total) by mouth daily at bedtime, Disp: 90 tablet, Rfl: 3    Blood Glucose Monitoring Suppl (OneTouch Verio Flex System) w/Device KIT, USE TO TEST BLOOD GLUCOSE DAILY, Disp: 1 kit, Rfl: 0    Cholecalciferol (VITAMIN D) 50 MCG ( UT) tablet, Take 2,000 Units by mouth daily, Disp: , Rfl:     citalopram (CeleXA) 20 mg tablet, TAKE ONE TABLET BY MOUTH EVERY DAY, Disp: 30 tablet, Rfl: 3    fluticasone (FLONASE) 50 mcg/act nasal spray, 2 sprays into each nostril daily, Disp: 16 g, Rfl: 3    Lancets (OneTouch Delica Plus [id number]) MISC, TEST BLOOD GLUCOSE ONCE DAILY, Disp: 100 each, Rfl: 0    magnesium oxide (MAG-OX) 400 mg, Take 1 tablet (400 mg total) by mouth 2 (two) times a day, Disp: 180 tablet, Rfl: 2    metFORMIN (GLUCOPHAGE-XR) 500 mg 24 hr tablet, TAKE TWO TABLETS BY MOUTH TWICE A DAY WITH MEALS (Patient taking differently: 500 mg 1 tablets 2 times daily), Disp: 360 tablet, Rfl: 0    metoprolol tartrate (LOPRESSOR) 50 mg tablet, TAKE ONE TABLET BY MOUTH TWICE A DAY, Disp: 180 tablet, Rfl: 4    nystatin (MYCOSTATIN) powder, Apply topically 3 (three) times a day, Disp: 30 g, Rfl: 3    omeprazole (PriLOSEC) 40 MG capsule, Take 1 capsule (40 mg total) by mouth daily, Disp: 90 capsule, Rfl: 3    OneTouch Verio test strip, TEST ONCE A DAY, Disp: 100 each, Rfl: 0    traMADol (ULTRAM) 50 mg tablet, Take 1 tablet (50 mg total) by mouth every 6 (six) hours as needed for moderate pain, Disp: 20 tablet, Rfl: 0    vitamin E, tocopherol, 400 units capsule, Take 400 Units by mouth daily, Disp: , Rfl:     Multiple Vitamins-Minerals (OCUVITE-LUTEIN PO), Take 1 tablet by mouth 2 (two) times a day Pt is taking preservision (Patient not taking: Reported on 7/19/2021), Disp: , Rfl:     sodium bicarbonate 650 mg tablet, Take 2 tablets (1,300 mg total) by mouth 2 (two) times a day, Disp: 360 tablet, Rfl: 3    sodium chloride, PF, 0 9 %, Infuse 10 mL into a venous catheter daily Please flush each drain with 10cc each daily, Disp: 60 Syringe, Rfl: 0      PHYSICAL EXAM:  Vitals:    07/19/21 1426 07/19/21 1458   BP: 128/68    BP Location: Left arm    Patient Position: Sitting    Cuff Size: Standard    Pulse: (!) 51 60   Resp: 16    Weight: 77 1 kg (170 lb)    Height: 5' 8" (1 727 m) Body mass index is 25 85 kg/m²  Physical Exam  Constitutional:       General: He is not in acute distress  Appearance: He is well-developed  He is not diaphoretic  HENT:      Head: Normocephalic and atraumatic  Mouth/Throat:      Mouth: Mucous membranes are moist    Eyes:      General: No scleral icterus  Conjunctiva/sclera: Conjunctivae normal       Pupils: Pupils are equal, round, and reactive to light  Neck:      Thyroid: No thyromegaly  Cardiovascular:      Rate and Rhythm: Normal rate and regular rhythm  Heart sounds: Normal heart sounds  No murmur heard  No friction rub  Pulmonary:      Effort: Pulmonary effort is normal  No respiratory distress  Breath sounds: Normal breath sounds  No wheezing or rales  Abdominal:      General: Bowel sounds are normal  There is no distension  Palpations: Abdomen is soft  Tenderness: There is no abdominal tenderness  Comments: Ileal conduit+   Musculoskeletal:         General: No deformity  Cervical back: Neck supple  Right lower leg: No edema  Left lower leg: No edema  Lymphadenopathy:      Cervical: No cervical adenopathy  Skin:     Coloration: Skin is not pale  Nails: There is no clubbing  Neurological:      Mental Status: He is alert and oriented to person, place, and time  He is not disoriented  Psychiatric:         Mood and Affect: Mood normal  Mood is not anxious  Affect is not inappropriate  Behavior: Behavior normal          Thought Content:  Thought content normal          Lab Results:   Results for orders placed or performed in visit on 78/78/23   Basic metabolic panel   Result Value Ref Range    Sodium 140 136 - 145 mmol/L    Potassium 4 4 3 5 - 5 3 mmol/L    Chloride 113 (H) 100 - 108 mmol/L    CO2 17 (L) 21 - 32 mmol/L    ANION GAP 10 4 - 13 mmol/L    BUN 50 (H) 5 - 25 mg/dL    Creatinine 2 23 (H) 0 60 - 1 30 mg/dL    Glucose, Fasting 119 (H) 65 - 99 mg/dL    Calcium 9 0 8 3 - 10 1 mg/dL    eGFR 27 ml/min/1 73sq m   Magnesium   Result Value Ref Range    Magnesium 1 2 (L) 1 6 - 2 6 mg/dL     Lab Results:   Results from last 7 days   Lab Units 07/16/21  0908   POTASSIUM mmol/L 4 4   CHLORIDE mmol/L 113*   CO2 mmol/L 17*   BUN mg/dL 50*   CREATININE mg/dL 2 23*   CALCIUM mg/dL 9 0   MAGNESIUM mg/dL 1 2*       Laboratory Results:  Lab Results   Component Value Date    WBC 8 97 07/07/2021    HGB 11 8 (L) 07/07/2021    HCT 36 6 07/07/2021    MCV 95 07/07/2021     07/07/2021     Lab Results   Component Value Date    SODIUM 140 07/16/2021    K 4 4 07/16/2021     (H) 07/16/2021    CO2 17 (L) 07/16/2021    BUN 50 (H) 07/16/2021    CREATININE 2 23 (H) 07/16/2021    GLUC 111 02/25/2021    CALCIUM 9 0 07/16/2021     Lab Results   Component Value Date    CALCIUM 9 0 07/16/2021    PHOS 3 5 07/07/2021     No results found for: LABPROT  [unfilled]  Lab Results   Component Value Date    PTH 27 8 07/07/2021    CALCIUM 9 0 07/16/2021    PHOS 3 5 07/07/2021     [unfilled]

## 2021-07-29 NOTE — PROGRESS NOTE ADULT - PROBLEM/PLAN-2
DISPLAY PLAN FREE TEXT
30
DISPLAY PLAN FREE TEXT

## 2021-08-31 NOTE — PROGRESS NOTE ADULT - PROBLEM SELECTOR PROBLEM 3
Type 2 diabetes mellitus Dermal Autograft Text: The defect edges were debeveled with a #15 scalpel blade.  Given the location of the defect, shape of the defect and the proximity to free margins a dermal autograft was deemed most appropriate.  Using a sterile surgical marker, the primary defect shape was transferred to the donor site. The area thus outlined was incised deep to adipose tissue with a #15 scalpel blade.  The harvested graft was then trimmed of adipose and epidermal tissue until only dermis was left.  The skin graft was then placed in the primary defect and oriented appropriately.

## 2021-10-21 NOTE — H&P ADULT - NSHPPHYSICALEXAM_GEN_ALL_CORE
Abdomen soft, non-tender, no rebound, no guarding. GENERAL: NAD, well-developed  HEAD:  Atraumatic, Normocephalic  EYES: EOMI, PERRLA, conjunctiva and sclera clear  NECK: Supple, No JVD  CHEST/LUNG: Clear to auscultation bilaterally; No wheeze/rhonchi/rales   HEART: Regular rate and rhythm; normal S1 S2,  No murmurs, rubs, or gallops  ABDOMEN: Soft, Nontender, Nondistended; Bowel sounds normal  EXTREMITIES:  2+ Peripheral Pulses, No clubbing, cyanosis, or edema  PSYCH: AAOx3, No acute distress   NEUROLOGY: non-focal  SKIN: No rashes or lesions GENERAL: NAD, well-developed  HEAD:  Atraumatic, Normocephalic  EYES: EOMI, PERRLA, conjunctiva and sclera clear  NECK: Supple, No JVD  CHEST/LUNG: Mostly clear to auscultation bilaterally; No wheeze, soft bibasalar crackles    HEART: Regular rate and rhythm; normal S1 S2,  No murmurs, rubs, or gallops  ABDOMEN: Soft, Nontender, Nondistended; Bowel sounds normal  EXTREMITIES:  2+ Peripheral Pulses, No clubbing, cyanosis, or edema  PSYCH: AAOx3, No acute distress   NEUROLOGY: non-focal  SKIN: No rashes or lesions

## 2021-11-09 NOTE — H&P ADULT - CONTRAINDICATIONS ACEI/ARB MEDS
Biopsy Type: H and E Validate Triangulation: No Biopsy Method: Dermablade Type Of Destruction Used: Curettage Billing Type: Third-Party Bill Cryotherapy Text: The wound bed was treated with cryotherapy after the biopsy was performed. Information: Selecting Yes will display possible errors in your note based on the variables you have selected. This validation is only offered as a suggestion for you. PLEASE NOTE THAT THE VALIDATION TEXT WILL BE REMOVED WHEN YOU FINALIZE YOUR NOTE. IF YOU WANT TO FAX A PRELIMINARY NOTE YOU WILL NEED TO TOGGLE THIS TO 'NO' IF YOU DO NOT WANT IT IN YOUR FAXED NOTE. Wound Care: Petrolatum Size Of Lesion In Cm: 0.2 Post-Care Instructions: I reviewed with the patient in detail post-care instructions. Patient is to keep the biopsy site dry overnight, and then apply bacitracin twice daily until healed. Patient may apply hydrogen peroxide soaks to remove any crusting. Dressing: bandage Consent: Written consent was obtained and risks were reviewed including but not limited to scarring, infection, bleeding, scabbing, incomplete removal, nerve damage and allergy to anesthesia. Electrodesiccation And Curettage Text: The wound bed was treated with electrodesiccation and curettage after the biopsy was performed. Anesthesia Volume In Cc: 0.5 Electrodesiccation Text: The wound bed was treated with electrodesiccation after the biopsy was performed. Notification Instructions: Patient will be notified of biopsy results. However, patient instructed to call the office if not contacted within 2 weeks. Was A Bandage Applied: Yes Anesthesia Type: 1% lidocaine with epinephrine and a 1:10 solution of 8.4% sodium bicarbonate Curettage Text: The wound bed was treated with curettage after the biopsy was performed. Silver Nitrate Text: The wound bed was treated with silver nitrate after the biopsy was performed. Detail Level: Detailed Depth Of Biopsy: dermis Hemostasis: Drysol Additional Anesthesia Volume In Cc (Will Not Render If 0): 0 hypotension

## 2021-12-19 NOTE — ED PROVIDER NOTE - CROS ED RESP ALL NEG
RX PROGRESS NOTE: Vancomycin Therapeutic Drug Monitoring    Day of therapy: 6    Indication and target trough:Enterococcus faecalis bacteremia (10-15 mcg/mL)    Current vancomycin dosing regimen: 1750 mg IVPB every 24hours    Most recent height and weight information:  Weight: 83.4 kg (12/16/21 0517)  Height: 5' 6\" (167.6 cm) (11/24/21 2104)    The Following are the Calculated  Current Weights for Kwasi Khan     Adjusted Ideal    71.6 kg 63.8 kg          Labs:  Serum Creatinine and Creatinine Clearance:  Serum creatinine: 2.95 mg/dL (H) 12/19/21 0645  Estimated creatinine clearance: 25.3 mL/min (A)    Vancomycin Serum Concentrations:  Vancomycin, Trough (mcg/mL)   Date/Time Value   12/19/2021 0645 41.7 (HH)       Assessment:  Serum concentration of 41.7 mcg/mL.  Pt transitioned from CRRT to HD. Based on the serum concentration, will discontinue scheduled doses and change to serial dosing based on random levels.    Additional serum concentrations may be necessary depending on pathogen identified, risk factors for adverse events, and/or duration of therapy.  Pharmacy will continue to follow and adjust as needed.    Thank you,    Cielo Sears, PHARMD  12/19/2021 7:50 AM  Contact #      - - -

## 2022-01-13 NOTE — PROGRESS NOTE ADULT - PROBLEM SELECTOR PLAN 1
The physician has been notified. .  - Acute on chronic HoTN (baseline SBP 90s-100s). Profound drop in BP is likely due to volume depletion in the setting of Influenza. s/p 450cc 2/17.  - Goal SBP 85, per cardiology.  - Levophed gtt started 2/17, currently 0.1 mcg, trying to wean, however pt so far has not tolerated decreased rate.  - Midodrine dose increased to 30mg TID yesterday, which is pt's home dose  - HD today

## 2022-02-16 NOTE — PROGRESS NOTE ADULT - SUBJECTIVE AND OBJECTIVE BOX
Patient is a 63y old  Male who presents with a chief complaint of SOB (29 May 2018 15:55)    Any change in ROS: sob (+), cough (-), sputum (+). on Dobutamine drip     MEDICATIONS  (STANDING):  amiodarone    Tablet 100 milliGRAM(s) Oral daily  atorvastatin 80 milliGRAM(s) Oral at bedtime  bisacodyl 5 milliGRAM(s) Oral at bedtime  chlorhexidine 4% Liquid 1 Application(s) Topical <User Schedule>  DOBUTamine Infusion 2.5 MICROgram(s)/kG/Min (5.325 mL/Hr) IV Continuous <Continuous>  docusate sodium 100 milliGRAM(s) Oral two times a day  finasteride 5 milliGRAM(s) Oral daily  levothyroxine 75 MICROGram(s) Oral daily  pantoprazole    Tablet 40 milliGRAM(s) Oral before breakfast  senna 2 Tablet(s) Oral at bedtime  sevelamer hydrochloride 1600 milliGRAM(s) Oral <User Schedule>  sodium chloride 0.65% Nasal 1 Spray(s) Both Nostrils four times a day    MEDICATIONS  (PRN):  ALBUTerol/ipratropium for Nebulization 3 milliLiter(s) Nebulizer every 6 hours PRN Shortness of Breath and/or Wheezing    Vital Signs Last 24 Hrs  T(C): 37 (02 Jun 2018 08:00), Max: 37.4 (01 Jun 2018 16:03)  T(F): 98.6 (02 Jun 2018 08:00), Max: 99.3 (01 Jun 2018 16:03)  HR: 93 (02 Jun 2018 09:00) (84 - 108)  BP: 90/59 (02 Jun 2018 09:00) (71/42 - 107/64)  BP(mean): 64 (02 Jun 2018 09:00) (38 - 72)  RR: 13 (02 Jun 2018 09:00) (9 - 23)  SpO2: 98% (02 Jun 2018 09:00) (88% - 98%)    I&O's Summary    01 Jun 2018 07:01  -  02 Jun 2018 07:00  --------------------------------------------------------  IN: 1296.8 mL / OUT: 2600 mL / NET: -1303.2 mL    02 Jun 2018 07:01  -  02 Jun 2018 09:28  --------------------------------------------------------  IN: 70.6 mL / OUT: 0 mL / NET: 70.6 mL      Physical Exam:   GENERAL: The patient comfortable with no apparent distress.   HEENT: Head is normocephalic and atraumatic.    NECK: Supple with no elevated JVP.  LUNGS: crackles to posterior RLL otherwise clear to auscultation  HEART: S1 and S2 present without murmur. irregular   ABDOMEN: Soft, nontender, and nondistended. No hepatosplenomegaly is noted.  EXTREMITIES: No edema or calf tenderness.  NEUROLOGIC: Grossly intact. slightly lethargic     Labs:  ABG - ( 31 May 2018 22:50 )  pH, Arterial: 7.34  pH, Blood: x     /  pCO2: 46    /  pO2: 82    / HCO3: 23    / Base Excess: -0.7  /  SaO2: 94.7          26, 26, 24, 23, 24, 24, 27, 23, 24                            12.8   17.92 )-----------( 117      ( 02 Jun 2018 05:45 )             40.6                         12.7   11.45 )-----------( 96       ( 01 Jun 2018 02:50 )             41.1                         11.8   6.95  )-----------( 96       ( 31 May 2018 05:07 )             38.2                         12.0   5.88  )-----------( 92       ( 30 May 2018 05:45 )             39.7     06-02    130<L>  |  93<L>  |  27<H>  ----------------------------<  199<H>  4.4   |  26  |  4.20<H>  06-01    127<L>  |  89<L>  |  34<H>  ----------------------------<  155<H>  4.9   |  23  |  4.87<H>  05-31    129<L>  |  88<L>  |  26<H>  ----------------------------<  188<H>  3.6   |  26  |  3.73<H>  05-30    129<L>  |  91<L>  |  48<H>  ----------------------------<  111<H>  4.4   |  23  |  5.53<H>    Ca    9.2      02 Jun 2018 05:45  Ca    9.0      01 Jun 2018 02:50  Phos  2.9     06-02  Phos  4.0     06-01  Mg     2.2     06-02  Mg     2.2     06-01    TPro  7.9  /  Alb  3.2<L>  /  TBili  0.4  /  DBili  0.2  /  AST  28  /  ALT  24  /  AlkPhos  206<H>  05-31    CAPILLARY BLOOD GLUCOSE            PT/INR - ( 02 Jun 2018 05:45 )   PT: 56.2 SEC;   INR: 4.73          PTT - ( 01 Jun 2018 02:50 )  PTT:53.2 SEC        Studies  Chest X-RAY   < from: Xray Chest 1 View- PORTABLE-Routine (06.01.18 @ 14:26) >    EXAM:  XR CHEST PORTABLE ROUTINE 1V        PROCEDURE DATE:  Jun 1 2018         INTERPRETATION:  CLINICAL INFORMATION: Status post Crabtree-Chris catheter   placement.    EXAM: AP view of chest.    COMPARISON: Chest radiograph from 5/31/2018.    FINDINGS:    AICD on the left chest wall. Right IJ approach dialysis catheter   terminating in the SVC.  IVC approach Crabtree-Chris catheter terminating in the right pulmonary artery.  Small right pleural effusion with associated atelectasis.  Diffuse bilateral lung reticular opacities grossly  Slightly increased compared to the prior study.    IMPRESSION:   Crabtree-Chris catheter terminating in the right pulmonary artery.    Diffuse bilateral lung reticular opacities grossly may be slightly   increased comparedto the prior study. Acute on chronic lung changes is a   consideration.      < end of copied text >    CT SCAN Chest   < from: CT Chest No Cont (03.22.18 @ 16:15) >  EXAM:  CT CHEST        PROCEDURE DATE:  Mar 22 2018         INTERPRETATION:  CLINICAL INFORMATION: Hemoptysis. History of pulmonary   fibrosis, COPD, CHF, end-stage renal disease.    COMPARISON: June 5, 2017.    PROCEDURE:   CT of the Chest was performed without intravenous contrast.  Sagittal and coronal reformats were performed.    FINDINGS:    LUNGS AND LARGE AIRWAYS: Unchanged traction bronchiectasis and bilateral   groundglass opacities.  PLEURA: Small right pleural effusion.  VESSELS: Right-sided central venous catheter with tip terminating in the   right atrium.   HEART: Cardiomegaly. Left chest wall ICD. No pericardial effusion.  MEDIASTINUM AND ASH: No lymphadenopathy.  CHEST WALL AND LOWER NECK: Within normal limits.  VISUALIZED UPPER ABDOMEN: Partially imaged dense material inside the   gallbladder, possibly gallstones.  BONES: Degenerative changes of the spine.    IMPRESSION:   Unchanged interstitial lung disease. No new consolidation.    < end of copied text >    Venous Dopplers: LE:    CT Abdomen  Others  < from: TTE with Doppler (w/Cont) (05.28.18 @ 08:43) >    Patient name: Patrice Plascencia  YOB: 1955   Age: 63 (M)   MR#: [id number]  Study Date: 5/28/2018  Location: [facility]: Merycarol Veraman  Study quality: Technically good  Referring Physician: Dc Luna MD  Blood Pressure: 85/51 mmHg  Height: 180 cm  Weight: 71 kg  BSA: 1.9 m2  ------------------------------------------------------------------------  PROCEDURE: Transthoracic echocardiogram with 2-D, M-Mode  and complete spectral and color flow Doppler.  Verbal consent was obtained for injection of echo contrast.  Following  intravenous injection of contrast, harmonic  imaging was performed. LOT#[id number]  INDICATION: Cardiomyopathy, unspecified (I42.9)  ------------------------------------------------------------------------  DIMENSIONS:  Dimensions:     Normal Values:  LA:     5.2 cm    2.0 - 4.0 cm  Ao:     3.2 cm    2.0 - 3.8 cm  SEPTUM: 1.0 cm    0.6 - 1.2 cm  PWT:    0.9 cm    0.6 - 1.1 cm  LVIDd:  6.0 cm    3.0 - 5.6 cm  LVIDs:  5.6 cm    1.8 - 4.0 cm  Derived Variables:  LVMI: 122 g/m2  RWT: 0.30  Fractional short: 7 %  Ejection Fraction (Teicholtz): 15 %  ------------------------------------------------------------------------  OBSERVATIONS:  Mitral Valve: Normal mitral valve. Mild mitral  regurgitation.  Aortic Root: Normal aortic root.  Aortic Valve: Normal trileaflet aortic valve. Peak left  ventricular outflow tract gradient equals 3.2 mm Hg, mean  gradient is equal to 1 mm Hg, LVOT velocity time integral  equals 16 cm.  Left Atrium: Mildly dilated left atrium.  LA volume index =  40 cc/m2.  Left Ventricle: Severe global left ventricular systolic  dysfunction.   Endocardial visualization enhanced with  intravenous injection of echo contrast (Definity). No left  ventricular thrombus.   Septal motion consistent with right  ventricular overload. Eccentric left ventricular  hypertrophy (dilated left ventricle with normal relative  wall thickness). (DT:449 ms).  Right Heart: Severe right atrial enlargement.   A device  wire is noted in the right heart. Right ventricular  enlargement with decreased right ventricular systolic  function. Normal tricuspid valve.  Severe tricuspid  regurgitation. Normal pulmonic valve. Mild pulmonic  regurgitation.  Pericardium/PleuraSmall pericardial effusion.  Hemodynamic: Estimated right ventricular systolic pressure  equals 56 mm Hg, assuming right atrial pressure equals 10  mm Hg, consistent with moderate pulmonary hypertension.  ------------------------------------------------------------------------  CONCLUSIONS:  1. Mildly dilated left atrium.  LA volume index = 40 cc/m2.  2. Eccentric left ventricular hypertrophy (dilated left  ventricle with normal relative wall thickness).  3. Peak left ventricular outflow tract gradient equals 3.2  mm Hg, mean gradient is equal to 1 mm Hg, LVOT velocity  time integral equals 16 cm. No left ventricular thrombus.  Septal motion consistent with right ventricular overload.  4. Severe right atrial enlargement.   A device wire is  noted in the right heart.  5. Normal tricuspid valve.  Severe tricuspid regurgitation.  6. Estimated pulmonary artery systolic pressure equals 56  mm Hg, assuming right atrial pressure equals 10  mm Hg,  consistent with moderate pulmonary hypertension.  *** Compared with echocardiogram of 10/18/2017, no  significant changes noted.  -----------------------------------------------------------    < end of copied text > Statement Selected

## 2022-03-03 NOTE — PROGRESS NOTE ADULT - PROBLEM SELECTOR PLAN 4
127
-Monitor BMP  -HD per renal  -c/w sevelamer
-Monitor BMP  -HD per renal, plan for HD today
added epogen 8k tiw w/hd  monitor H/H

## 2022-03-27 NOTE — ED PROVIDER NOTE - NS ED MD DISPO SPECIAL CONSIDERATION1
3rd attempt, call decline, attempted to call emergency contact as well  No answer   Please send letter
Attempted to call with results but call was declined 
Called and verified patient by name and birthday  Informed of +gonorrhea result  Patient has had gonorrhea two other times in the past year and was treated with rocephin 500 mg at that time  He was given rocephin in the ED, however was given a dose of 250 mg  I recommended patient return to ED to be re-treated   Advised to abstain from sex x 7-10 days and inform partners
None

## 2022-04-27 NOTE — DIETITIAN INITIAL EVALUATION ADULT. - WEIGHT IN KG
Gracie Barahona is a 33 year old right-hand-dominant White female whom I am asked to see in consultation by TY So for evaluation of her left thumb.  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  She started developed aching and soreness her left thumb back in December and then her thumb started to catch/lock in January.  She has tried anti-inflammatories, modify her activity, and performed occupational hand therapy, including the use of his splint, but none of these measures have led to significant symptom improvement.  She notes pain and “popping”/catching in her left thumb with any movement.    I have reviewed the patient's past medical, surgical, social and family history, updating these as appropriate. See Histories section of the EMR (electronic medical record) for a display of this information.    Examination of her left thumb reveals benign skin and soft tissues.  She has a tender nodule near the A1 pulley.  She can flex and extend the IP joint but there is some crepitus and painful clicking at the A1 pulley.  She has intact sensation and excellent capillary refill in the tip of the thumb.  Her other fingers are unremarkable.    Assessment/plan:  Left trigger thumb.  I discussed stenosing tenosynovitis/trigger thumb with the patient.  We discussed options of continued conservative treatment versus corticosteroid injection versus surgery, including the pros and cons of each, and she wishes to proceed with surgery.  I discussed with her left trigger thumb release under a Maggy block anesthetic as an outpatient. The risks, benefits, alternatives, and complications of the surgical procedure, anesthetic, and recovery were discussed with the patient and they wish to proceed. We will schedule the patient for this procedure and ask their primary care physician to see them in consultation for a preoperative evaluation.  She could work  with the following restrictions between now and surgery: Right-handed work only.  Use left hand for light assistance using the 2nd through 5th digits only/avoid use of the left thumb.  She should be allowed to wear her brace as needed.     [id number]

## 2022-05-20 NOTE — DISCHARGE NOTE ADULT - USE THE 5 A'S (ASK, ADVISE, ASSESS, ASSIST, ARRANGE)
Uploading and hyper-linking Colonoscopy done 5.18.2022. Will add pathology results when they are available. Fax request sent to [facility] for eye exam.   Uploading and hyper-linking DM eye exam done 3.17.2022. Added DX to update EMR.   Statement Selected

## 2022-09-06 NOTE — PATIENT PROFILE ADULT. - PATIENT REPRESENTATIVE: ( YOU CAN CHOOSE ANY PERSON THAT CAN ASSIST YOU WITH YOUR HEALTH CARE PREFERENCES, DOES NOT HAVE TO BE A SPOUSE, IMMEDIATE FAMILY OR SIGNIFICANT OTHER/PARTNER)
Chronic, controlled.  Latest blood pressure and vitals reviewed-   Temp:  [97.2 °F (36.2 °C)-98.1 °F (36.7 °C)]   Pulse:  [59-76]   Resp:  [16-19]   BP: (103-131)/(57-69)   SpO2:  [96 %-98 %] .   Home meds for hypertension were reviewed and noted below.   Hypertension Medications             losartan (COZAAR) 25 MG tablet 1 tablet DAILY (route: oral)          While in the hospital, will manage blood pressure as follows; Continue home antihypertensive regimen    Will utilize p.r.n. blood pressure medication only if patient's blood pressure greater than  180/110 and he develops symptoms such as worsening chest pain or shortness of breath.   Yes

## 2022-11-13 NOTE — PHYSICAL THERAPY INITIAL EVALUATION ADULT - AMBULATION SKILLS, REHAB EVAL
The patient is Watcher - Medium risk of patient condition declining or worsening    Shift Goals  Clinical Goals: get out of restraints  Patient Goals: feel better  Family Goals: no more restraints    Progress made toward(s) clinical / shift goals:    Problem: Knowledge Deficit - Standard  Goal: Patient and family/care givers will demonstrate understanding of plan of care, disease process/condition, diagnostic tests and medications  Outcome: Progressing  Note: Updated patient on plan of care including medications and treatments. Encouraged verbalization of questions and concerns. All concerns have been addressed at this time.      Problem: Safety - Medical Restraint  Goal: Remains free of injury from restraints (Restraint for Interference with Medical Device)  Outcome: Met  Note: Discontinued restraints       Patient is not progressing towards the following goals:       rolling walker, occasional straight cane/needs device and assist

## 2023-01-05 NOTE — PROGRESS NOTE ADULT - SUBJECTIVE AND OBJECTIVE BOX
Pt seen and examined during HD at bedside.  Pt denies any complaints today. No SOB or pain.   Remains on levophed.     Allergies:  No Known Allergies    Hospital Medications:   MEDICATIONS  (STANDING):  ALBUTerol/ipratropium for Nebulization 3 milliLiter(s) Nebulizer every 6 hours  amiodarone    Tablet 100 milliGRAM(s) Oral daily  aspirin enteric coated 81 milliGRAM(s) Oral daily  atorvastatin 80 milliGRAM(s) Oral at bedtime  buDESOnide   90 MICROgram(s) Inhaler 2 Puff(s) Inhalation two times a day  chlorhexidine 4% Liquid 1 Application(s) Topical daily  dextrose 5%. 1000 milliLiter(s) (50 mL/Hr) IV Continuous <Continuous>  dextrose 5%. 1000 milliLiter(s) (50 mL/Hr) IV Continuous <Continuous>  dextrose 50% Injectable 12.5 Gram(s) IV Push once  dextrose 50% Injectable 25 Gram(s) IV Push once  dextrose 50% Injectable 25 Gram(s) IV Push once  DOBUTamine Infusion 7.5 MICROgram(s)/kG/Min (16.942 mL/Hr) IV Continuous <Continuous>  docusate sodium 100 milliGRAM(s) Oral two times a day  finasteride 5 milliGRAM(s) Oral daily  influenza   Vaccine 0.5 milliLiter(s) IntraMuscular once  insulin lispro (HumaLOG) corrective regimen sliding scale   SubCutaneous three times a day before meals  insulin lispro (HumaLOG) corrective regimen sliding scale   SubCutaneous at bedtime  levothyroxine 75 MICROGram(s) Oral daily  midodrine 30 milliGRAM(s) Oral three times a day  MIRACLE MOUTHWASH 30 milliLiter(s) Swish and Spit three times a day  Nephro-lynda 1 Tablet(s) Oral daily  norepinephrine Infusion 0.1 MICROgram(s)/kG/Min (14.063 mL/Hr) IV Continuous <Continuous>  polyethylene glycol 3350 17 Gram(s) Oral daily  senna 2 Tablet(s) Oral at bedtime    VITALS:  T(F): 97.7 (02-21-18 @ 07:15), Max: 98.8 (02-21-18 @ 04:00)  HR: 83 (02-21-18 @ 09:00)  BP: 113/69 (02-21-18 @ 09:00)  RR: 19 (02-21-18 @ 09:00)  SpO2: 97% (02-21-18 @ 09:00)  Wt(kg): --    02-20 @ 07:01  -  02-21 @ 07:00  --------------------------------------------------------  IN: 1133.8 mL / OUT: 0 mL / NET: 1133.8 mL      PHYSICAL EXAM:  Constitutional: NAD  HEENT: anicteric sclera, oropharynx clear, MMM  Neck: + JVD  Respiratory: Right sided crackles at bases. No wheeze   Cardiovascular: S1, S2, RRR  Gastrointestinal: BS+, soft, NT/ND  Extremities: No cyanosis or clubbing. 1+ peripheral LE edema  Neurological: A/O x 3, no focal deficits  Psychiatric: Normal mood, normal affect  : No CVA tenderness. No rosa.   Skin: No rashes  Vascular Access: RIJ Tunneled cath.     LABS:  02-21    130<L>  |  91<L>  |  30<H>  ----------------------------<  110<H>  4.0   |  24  |  6.31<H>    Ca    8.0<L>      21 Feb 2018 05:40  Phos  4.3     02-21  Mg     2.4     02-21    TPro  7.4  /  Alb  2.6<L>  /  TBili  0.9  /  DBili      /  AST  81<H>  /  ALT  55<H>  /  AlkPhos  194<H>  02-21    Creatinine Trend: 6.31 <--, 5.28 <--, 7.09 <--, 7.03 <--, 5.15 <--, 5.71 <--, 4.54 <--, 6.56 <--, 5.16 <--                        10.7   5.77  )-----------( 104      ( 21 Feb 2018 05:40 )             32.8     Urine Studies:      RADIOLOGY & ADDITIONAL STUDIES: Pt seen and examined during HD at bedside.  Pt denies any complaints today. No SOB or pain.   Remains on levophed.     Allergies:  No Known Allergies    Hospital Medications:   MEDICATIONS  (STANDING):  ALBUTerol/ipratropium for Nebulization 3 milliLiter(s) Nebulizer every 6 hours  amiodarone    Tablet 100 milliGRAM(s) Oral daily  aspirin enteric coated 81 milliGRAM(s) Oral daily  atorvastatin 80 milliGRAM(s) Oral at bedtime  buDESOnide   90 MICROgram(s) Inhaler 2 Puff(s) Inhalation two times a day  chlorhexidine 4% Liquid 1 Application(s) Topical daily  dextrose 5%. 1000 milliLiter(s) (50 mL/Hr) IV Continuous <Continuous>  dextrose 5%. 1000 milliLiter(s) (50 mL/Hr) IV Continuous <Continuous>  dextrose 50% Injectable 12.5 Gram(s) IV Push once  dextrose 50% Injectable 25 Gram(s) IV Push once  dextrose 50% Injectable 25 Gram(s) IV Push once  DOBUTamine Infusion 7.5 MICROgram(s)/kG/Min (16.942 mL/Hr) IV Continuous <Continuous>  docusate sodium 100 milliGRAM(s) Oral two times a day  finasteride 5 milliGRAM(s) Oral daily  influenza   Vaccine 0.5 milliLiter(s) IntraMuscular once  insulin lispro (HumaLOG) corrective regimen sliding scale   SubCutaneous three times a day before meals  insulin lispro (HumaLOG) corrective regimen sliding scale   SubCutaneous at bedtime  levothyroxine 75 MICROGram(s) Oral daily  midodrine 30 milliGRAM(s) Oral three times a day  MIRACLE MOUTHWASH 30 milliLiter(s) Swish and Spit three times a day  Nephro-lynda 1 Tablet(s) Oral daily  norepinephrine Infusion 0.1 MICROgram(s)/kG/Min (14.063 mL/Hr) IV Continuous <Continuous>  polyethylene glycol 3350 17 Gram(s) Oral daily  senna 2 Tablet(s) Oral at bedtime    VITALS:  T(F): 97.7 (02-21-18 @ 07:15), Max: 98.8 (02-21-18 @ 04:00)  HR: 83 (02-21-18 @ 09:00)  BP: 113/69 (02-21-18 @ 09:00)  RR: 19 (02-21-18 @ 09:00)  SpO2: 97% (02-21-18 @ 09:00)  Wt(kg): --    02-20 @ 07:01  -  02-21 @ 07:00  --------------------------------------------------------  IN: 1133.8 mL / OUT: 0 mL / NET: 1133.8 mL      PHYSICAL EXAM:  Constitutional: NAD  HEENT: anicteric sclera, oropharynx clear, MMM  Neck: + JVD  Respiratory: Bibasilar crackles. No wheeze   Cardiovascular: S1, S2, RRR  Gastrointestinal: BS+, soft, NT/ND  Extremities: No cyanosis or clubbing. 1+ peripheral LE edema  Neurological: A/O x 3, no focal deficits  Psychiatric: Normal mood, normal affect  : No CVA tenderness. No rosa.   Skin: No rashes  Vascular Access: RIJ Tunneled cath.     LABS:  02-21    130<L>  |  91<L>  |  30<H>  ----------------------------<  110<H>  4.0   |  24  |  6.31<H>    Ca    8.0<L>      21 Feb 2018 05:40  Phos  4.3     02-21  Mg     2.4     02-21    TPro  7.4  /  Alb  2.6<L>  /  TBili  0.9  /  DBili      /  AST  81<H>  /  ALT  55<H>  /  AlkPhos  194<H>  02-21    Creatinine Trend: 6.31 <--, 5.28 <--, 7.09 <--, 7.03 <--, 5.15 <--, 5.71 <--, 4.54 <--, 6.56 <--, 5.16 <--                        10.7   5.77  )-----------( 104      ( 21 Feb 2018 05:40 )             32.8     Urine Studies:      RADIOLOGY & ADDITIONAL STUDIES: Biopsy Method: 15 blade

## 2023-01-26 NOTE — PROGRESS NOTE ADULT - PROBLEM SELECTOR PLAN 7
Duplicate. Will be addressed in 1/23 SERVANDO Mcgarry RN, BSN  [facility]     PPS 30-40  Full Code

## 2023-02-10 NOTE — ED PROVIDER NOTE - GASTROINTESTINAL NEGATIVE STATEMENT, MLM
For information on Fall & Injury Prevention, visit: [email or web address] OR  [email or web address] OR  https://www.cdc.gov/steadi/patient.html
No cyanosis, no pallor, no jaundice, no rash
no abdominal pain, no bloating, no constipation, no diarrhea, no nausea and no vomiting.
FAMILY HISTORY:  Father  Still living? No  Family history of meningitis, Age at diagnosis: Age Unknown    Mother  Still living? No  Family history of breast cancer, Age at diagnosis: Age Unknown  Family history of hypertension, Age at diagnosis: Age Unknown    Sibling  Still living? Yes, Estimated age: Age Unknown  Family history of thyroid disease, Age at diagnosis: Age Unknown

## 2023-03-29 NOTE — PROGRESS NOTE ADULT - ASSESSMENT
Hello, please see attached note from meeting this morning. Thanks!  63 yo Male  with acute on chronic severe systolic CHF (EF 19%), ESRD, DM2, A fib, HCAP  - HCAP -s/p abs  aocd  - Pulmonary fibrosis, COPD - c/w inhalers, no benefit of steroids  - Acute on chronic systolic CHF - continue inotropic support as per Cardio, HD for maximum fluid removal  - ESRD - continue HD as per Renal, continue Midodrine.  - A fib - continue Amio,   - DM2 - controlled, continue with ISS  - LLE decreased ROM -imaging w/ hematomas  sx eval noted  no sx intervention  hold a/c for now  analgesics  pt  discussed w/ pt /family at bedside  wish to resume coumadin   discussed risks of rebleed  will recheck ct a/p inpt vs outpt if reabsorbing can resume with increased risk of bleed  d/c planning   cpap as per pulm   prognosis poor /family still wish all measures to be taken for pts medical care  do not wish hospice / pall care  discussed very poor prognosis again w/ pt/ family at bedside and no further medical tx available for pts end stage disease

## 2023-03-31 NOTE — PROGRESS NOTE ADULT - PROBLEM SELECTOR PROBLEM 2
Severe pulmonary hypertension Chronic atrial fibrillation Severe pulmonary hypertension Chronic hypotension

## 2023-06-06 NOTE — PROGRESS NOTE ADULT - SUBJECTIVE AND OBJECTIVE BOX
Patient is a 62y old  Male who presents with a chief complaint of syncope (22 Jun 2017 12:35)    doping ok     sitting on chair at this time  4 L of oxygen  going home on BiPAP       Any change in ROS:     MEDICATIONS  (STANDING):  finasteride 5milliGRAM(s) Oral daily  amiodarone    Tablet 200milliGRAM(s) Oral daily  atorvastatin 20milliGRAM(s) Oral at bedtime  docusate sodium 100milliGRAM(s) Oral daily  midodrine 30milliGRAM(s) Oral every 8 hours  sevelamer hydrochloride 800milliGRAM(s) Oral three times a day with meals  levothyroxine 75MICROGram(s) Oral daily  insulin lispro (HumaLOG) corrective regimen sliding scale  SubCutaneous three times a day before meals  insulin lispro (HumaLOG) corrective regimen sliding scale  SubCutaneous at bedtime  dextrose 5%. 1000milliLiter(s) IV Continuous <Continuous>  dextrose 50% Injectable 12.5Gram(s) IV Push once  dextrose 50% Injectable 25Gram(s) IV Push once  dextrose 50% Injectable 25Gram(s) IV Push once  DOBUTamine Infusion 7MICROgram(s)/kG/Min IV Continuous <Continuous>  acetaminophen   Tablet. 650milliGRAM(s) Oral once  levETIRAcetam  IVPB 500milliGRAM(s) IV Intermittent daily  levETIRAcetam  IVPB 250milliGRAM(s) IV Intermittent daily  buDESOnide 160 MICROgram(s)/formoterol 4.5 MICROgram(s) Inhaler 2Puff(s) Inhalation two times a day  epoetin alba Injectable [id number](s) IV Push <User Schedule>  glycerin Suppository - Adult 1Suppository(s) Rectal once    MEDICATIONS  (PRN):  dextrose Gel 1Dose(s) Oral once PRN Blood Glucose LESS THAN 70 milliGRAM(s)/deciliter  glucagon  Injectable 1milliGRAM(s) IntraMuscular once PRN Glucose LESS THAN 70 milligrams/deciliter  acetaminophen   Tablet 650milliGRAM(s) Oral every 6 hours PRN For Temp greater than 38 C (100.4 F)  acetaminophen    Suspension. 650milliGRAM(s) Oral every 6 hours PRN Moderate Pain (4 - 6)  artificial tears (preservative free) Ophthalmic Solution 1Drop(s) Both EYES three times a day PRN Dry Eyes  sodium chloride 0.65% Nasal 1Spray(s) Both Nostrils four times a day PRN Nasal Congestion  oxyCODONE  5 mG/acetaminophen 325 mG 2Tablet(s) Oral every 6 hours PRN Severe Pain (7 - 10)    Vital Signs Last 24 Hrs  T(C): 36.5, Max: 36.9 (06-23 @ 05:22)  T(F): 97.7, Max: 98.4 (06-23 @ 05:22)  HR: 73 (69 - 112)  BP: 86/47 (86/47 - 132/97)  BP(mean): --  RR: 18 (18 - 18)  SpO2: 94% (93% - 100%)    I&O's Summary    I & Os for current day (as of 23 Jun 2017 16:22)  =============================================  IN: 1438.4 ml / OUT: 3500 ml / NET: -2061.6 ml        Physical Exam:   GENERAL: NAD, well-groomed, well-developed  HEENT: BELL/   Atraumatic, Normocephalic  ENMT: No tonsillar erythema, exudates, or enlargement; Moist mucous membranes, Good dentition, No lesions  NECK: Supple, No JVD, Normal thyroid  CHEST/LUNG: Bilateral crckles ++  CVS: Regular rate and rhythm; No murmurs, rubs, or gallops  GI: : Soft, Nontender, Nondistended; Bowel sounds present  NERVOUS SYSTEM:  Alert & Oriented X3, Good concentration; Motor Strength 5/5 B/L upper and lower extremities; DTRs 2+ intact and symmetric  EXTREMITIES:  2+ Peripheral Pulses, No clubbing, cyanosis, or edema  LYMPH: No lymphadenopathy noted  SKIN: No rashes or lesions  ENDOCRINOLOGY: No Thyromegaly  PSYCH: Appropriate  Labs:                              8.8    7.82  )-----------( 204      ( 23 Jun 2017 07:00 )             29.9                         8.2    6.55  )-----------( 172      ( 22 Jun 2017 11:05 )             27.2                         8.2    7.50  )-----------( 195      ( 21 Jun 2017 07:00 )             27.6                         8.2    7.02  )-----------( 209      ( 20 Jun 2017 07:45 )             27.3                         8.8    10.78 )-----------( 183      ( 19 Jun 2017 20:50 )             30.7     06-23    132<L>  |  93<L>  |  23  ----------------------------<  84  4.2   |  24  |  4.03<H>  06-22    129<L>  |  89<L>  |  31<H>  ----------------------------<  111<H>  3.8   |  26  |  4.91<H>  06-21    136  |  101  |  19  ----------------------------<  71  3.3<L>   |  36<H>  |  3.15<H>  06-20    132<L>  |  93<L>  |  37<H>  ----------------------------<  81  4.4   |  24  |  6.05<H>  06-19    134<L>  |  94<L>  |  33<H>  ----------------------------<  135<H>  4.8   |  25  |  5.51<H>    Ca    7.5<L>      23 Jun 2017 07:00  Ca    8.6      22 Jun 2017 11:05  Mg     1.9     06-23  Mg     1.9     06-22    TPro  8.0  /  Alb  2.8<L>  /  TBili  1.6<H>  /  DBili  x   /  AST  40  /  ALT  16  /  AlkPhos  180<H>  06-19    CAPILLARY BLOOD GLUCOSE  143 (23 Jun 2017 11:32)  96 (23 Jun 2017 08:23)  90 (22 Jun 2017 22:13)  190 (22 Jun 2017 16:51)        PT/INR - ( 23 Jun 2017 07:00 )   PT: 17.1 SEC;   INR: 1.51          Studies  Chest X-RAY  CT SCAN Chest   Venous Dopplers: LE:   CT Abdomen  Others      IMPRESSION:   Bilateral iliopsoas hematomas are essentially unchanged.                  JULIET CHATTERJEE M.D., RADIOLOGY RESIDENT  This document has been electronically signed.  JOSH ZAMAN M.D., ATTENDING RADIOLOGIST  This document has been electronically signed. Jun 21 2017 11:57AM [Fever] : no fever [Chills] : no chills [Night Sweats] : no night sweats [Chest Pain] : no chest pain [Shortness Of Breath] : no shortness of breath [Abdominal Pain] : no abdominal pain [Nausea] : no nausea [Constipation] : no constipation [Diarrhea] : diarrhea [Vomiting] : no vomiting [Melena] : no melena

## 2023-06-15 NOTE — PROVIDER CONTACT NOTE (OTHER) - BACKGROUND
Pt on continuous dobutamine drip for blood pressure maintenance Doxycycline Pregnancy And Lactation Text: This medication is Pregnancy Category D and not consider safe during pregnancy. It is also excreted in breast milk but is considered safe for shorter treatment courses.

## 2023-07-03 NOTE — PROGRESS NOTE ADULT - SUBJECTIVE AND OBJECTIVE BOX
Patient is a 62y old  Male who presents with a chief complaint of sob (09 May 2017 15:32)    pt on nasal cannula today:    saying his breathing has been good       Any change in ROS:     MEDICATIONS  (STANDING):  finasteride 5milliGRAM(s) Oral daily  amiodarone    Tablet 200milliGRAM(s) Oral daily  atorvastatin 20milliGRAM(s) Oral at bedtime  docusate sodium 100milliGRAM(s) Oral daily  midodrine 30milliGRAM(s) Oral every 8 hours  sevelamer hydrochloride 800milliGRAM(s) Oral three times a day with meals  levothyroxine 75MICROGram(s) Oral daily  insulin lispro (HumaLOG) corrective regimen sliding scale  SubCutaneous three times a day before meals  insulin lispro (HumaLOG) corrective regimen sliding scale  SubCutaneous at bedtime  dextrose 5%. 1000milliLiter(s) IV Continuous <Continuous>  dextrose 50% Injectable 12.5Gram(s) IV Push once  dextrose 50% Injectable 25Gram(s) IV Push once  dextrose 50% Injectable 25Gram(s) IV Push once  DOBUTamine Infusion 7MICROgram(s)/kG/Min IV Continuous <Continuous>  acetaminophen   Tablet. 650milliGRAM(s) Oral once  levETIRAcetam  IVPB 500milliGRAM(s) IV Intermittent daily  levETIRAcetam  IVPB 250milliGRAM(s) IV Intermittent daily  buDESOnide 160 MICROgram(s)/formoterol 4.5 MICROgram(s) Inhaler 2Puff(s) Inhalation two times a day  epoetin alba Injectable [id number](s) IV Push <User Schedule>  warfarin 3milliGRAM(s) Oral once    MEDICATIONS  (PRN):  dextrose Gel 1Dose(s) Oral once PRN Blood Glucose LESS THAN 70 milliGRAM(s)/deciliter  glucagon  Injectable 1milliGRAM(s) IntraMuscular once PRN Glucose LESS THAN 70 milligrams/deciliter  acetaminophen   Tablet 650milliGRAM(s) Oral every 6 hours PRN For Temp greater than 38 C (100.4 F)  acetaminophen    Suspension. 650milliGRAM(s) Oral every 6 hours PRN Moderate Pain (4 - 6)  artificial tears (preservative free) Ophthalmic Solution 1Drop(s) Both EYES three times a day PRN Dry Eyes  sodium chloride 0.65% Nasal 1Spray(s) Both Nostrils four times a day PRN Nasal Congestion    Vital Signs Last 24 Hrs  T(C): 36.8, Max: 37 (06-11 @ 16:45)  T(F): 98.2, Max: 98.6 (06-11 @ 16:45)  HR: 80 (76 - 84)  BP: 91/47 (87/49 - 106/58)  BP(mean): --  RR: 18 (16 - 18)  SpO2: 90% (88% - 100%)    I&O's Summary  I & Os for 24h ending 12 Jun 2017 07:00  =============================================  IN: 605 ml / OUT: 0 ml / NET: 605 ml    I & Os for current day (as of 12 Jun 2017 16:43)  =============================================  IN: 500 ml / OUT: 2000 ml / NET: -1500 ml        Physical Exam:   GENERAL: NAD, well-groomed, well-developed  HEENT: BELL/   Atraumatic, Normocephalic  ENMT: No tonsillar erythema, exudates, or enlargement; Moist mucous membranes, Good dentition, No lesions  NECK: Supple, No JVD, Normal thyroid  CHEST/LUNG: crackles bilaterally   CVS: Regular rate and rhythm; No murmurs, rubs, or gallops  GI: : Soft, Nontender, Nondistended; Bowel sounds present  NERVOUS SYSTEM:  Alert & Oriented X3, Good concentration; Motor Strength 5/5 B/L upper and lower extremities; DTRs 2+ intact and symmetric  EXTREMITIES:  2+ Peripheral Pulses, No clubbing, cyanosis, or edema  LYMPH: No lymphadenopathy noted  SKIN: No rashes or lesions  ENDOCRINOLOGY: No Thyromegaly  PSYCH: Appropriate    Labs:                              7.2    9.23  )-----------( 171      ( 12 Jun 2017 06:30 )             24.9                         7.9    9.79  )-----------( 180      ( 11 Jun 2017 06:00 )             27.4                         7.6    11.62 )-----------( 184      ( 10 Niles 2017 08:18 )             25.4                         8.1    11.33 )-----------( 186      ( 09 Jun 2017 05:50 )             26.9     06-12    126<L>  |  89<L>  |  24<H>  ----------------------------<  309<H>  4.2   |  26  |  4.59<H>  06-11    135  |  95<L>  |  16  ----------------------------<  115<H>  3.8   |  27  |  3.54<H>  06-10    135  |  97<L>  |  23  ----------------------------<  128<H>  3.9   |  28  |  5.06<H>  06-09    140  |  99  |  13  ----------------------------<  147<H>  4.1   |  28  |  3.60<H>    Ca    8.5      12 Jun 2017 06:30  Ca    9.1      11 Jun 2017 06:00  Mg     1.9     06-12  Mg     1.9     06-11      CAPILLARY BLOOD GLUCOSE  143 (12 Jun 2017 11:39)  132 (12 Jun 2017 08:27)  149 (11 Jun 2017 21:40)  119 (11 Jun 2017 17:14)        PT/INR - ( 12 Jun 2017 06:30 )   PT: 30.3 SEC;   INR: 2.65              Studies  Chest X-RAY  CT SCAN Chest IMPRESSION:    Extensive pulmonary fibrosis.    Evidence of superimposed mild small airway disease.  Venous Dopplers: LE:   CT Abdomen  Others Retention Suture Bite Size: 3 mm

## 2023-08-17 NOTE — PROGRESS NOTE ADULT - PROBLEM SELECTOR PLAN 8
Discharge Instructions after Bronchoscopy    Activity  ___ You had medicine to relax and for pain. You may feel dizzy or sleepy.  For 24 hours:   Do not drive or use heavy equipment.   Do not make important decisions.   Do not drink any alcohol.    Diet  ___ When you can swallow easily, you may go back to your regular diet, medicines  and light exercise.    Follow-up  ___ We took small tissue or fluid samples to study. We will call you with the results in about 10 business days.    Call right away if you have:   Unusual chest pain   Temperature above 100.6  F (37.5  C)   Coughing that does not stop.    If you have severe pain, bleeding, or shortness of breath, go to an emergency room.    If you have questions, call:  Monday to Friday, 8 a.m. to 4:30 p.m.  Adult Pulmonology Clinic: [phone]    After hours:  Hospital: [phone] (Ask for the pulmonary fellow on call)   6/2 on Coumadin with today INR 4.73  6/3 on Coumadin. Today INR Is 6.16

## 2023-10-05 NOTE — CONSULT NOTE ADULT - ASSESSMENT
62 year old man with ESRD (HD MWF), NICM (EF 21%) s/p ICD, PICC line in place with home dobutamine drip, atrial fibrillation on coumadin, COPD on home O2 (4-5L), pulmonary fibrosis, hypotension on midodrine admitted with SOB secondary to influenza B infection.   Currently on Tamiflu for Influenza B.  Elevation of troponin is secondary to renal failure.  No evidence for ACS  Atrial fibrillation is well controlled and coumadin is therapeutic  Continue coumadin.  The discussion of amiodarone whether to continue or not because of pulmonary fibrosis was had last admission.  The risk of stopping the amiodarone outweigh the benefits.  Should continue amiodarone at the 100 mg daily dose.
62 year old man with ESRD (HD MWF), NICM (EF 21%) s/p ICD, PICC line in place with home dobutamine drip, atrial fibrillation on coumadin, COPD on home O2 (4-5L), pulmonary fibrosis, hypotension on midodrine admitted with SOB secondary to influenza B infection.
62y Male with ESRD on HD MWF, NICM with severe LV dysfnxn (EF 21%), s/p biotronic ICD, on home dobutamine drip, Afib on coumadin, COPD, pulm fibrosis on 3-4L home O2, DM, and chronic hypotension on midodrine admitted with SOB 2/2 pulm edema and Flu.
no
HPI:  Patient is a 62 year old man with ESRD (HD MWF), NICM (EF 21%) s/p ICD, PICC line in place with home dobutamine drip, atrial fibrillation on coumadin, COPD on home O2 (4-5L), pulmonary fibrosis, hypotension on midodrine with recent admission 1/23-1/31 for diarrhea found to be positive for human meta pneumovirus who now returns to [facility] ER complaining of shortness of breath RVP + consulted for assistance with medical decision making

## 2023-10-09 NOTE — PROGRESS NOTE ADULT - SUBJECTIVE AND OBJECTIVE BOX
• Continue Lexapro  • Ambien nightly as needed Patient is a 62y old  Male who presents with a chief complaint of "feeling like passing out" (18 Oct 2017 03:04)      SUBJECTIVE / OVERNIGHT EVENTS: Patient reports no change in symptoms. States SOB has been at his baseline.     Review of Systems:     MEDICATIONS  (STANDING):  amiodarone    Tablet 200 milliGRAM(s) Oral daily  aspirin enteric coated 81 milliGRAM(s) Oral daily  atorvastatin 80 milliGRAM(s) Oral at bedtime  buDESOnide 160 MICROgram(s)/formoterol 4.5 MICROgram(s) Inhaler 2 Puff(s) Inhalation two times a day  dextrose 50% Injectable 12.5 Gram(s) IV Push once  dextrose 50% Injectable 25 Gram(s) IV Push once  dextrose 50% Injectable 25 Gram(s) IV Push once  DOBUTamine Infusion 7.494 MICROgram(s)/kG/Min (17.85 mL/Hr) IV Continuous <Continuous>  docusate sodium 100 milliGRAM(s) Oral daily  epoetin alba Injectable 8000 Unit(s) IV Push <User Schedule>  finasteride 5 milliGRAM(s) Oral daily  insulin lispro (HumaLOG) corrective regimen sliding scale   SubCutaneous three times a day before meals  insulin lispro (HumaLOG) corrective regimen sliding scale   SubCutaneous at bedtime  levothyroxine 75 MICROGram(s) Oral daily  midodrine 10 milliGRAM(s) Oral every 8 hours  senna 2 Tablet(s) Oral at bedtime  sevelamer hydrochloride 800 milliGRAM(s) Oral three times a day with meals  warfarin 5 milliGRAM(s) Oral once    MEDICATIONS  (PRN):  dextrose Gel 1 Dose(s) Oral once PRN Blood Glucose LESS THAN 70 milliGRAM(s)/deciliter  glucagon  Injectable 1 milliGRAM(s) IntraMuscular once PRN Glucose LESS THAN 70 milligrams/deciliter  polyethylene glycol 3350 17 Gram(s) Oral daily PRN Constipation      PHYSICAL EXAM:  Vital Signs Last 24 Hrs  T(C): 36.6 (19 Oct 2017 05:29), Max: 36.8 (18 Oct 2017 15:15)  T(F): 97.8 (19 Oct 2017 05:29), Max: 98.2 (18 Oct 2017 15:15)  HR: 82 (19 Oct 2017 05:29) (75 - 82)  BP: 103/61 (19 Oct 2017 05:29) (98/63 - 117/61)  BP(mean): --  RR: 16 (19 Oct 2017 05:29) (16 - 17)  SpO2: 95% (19 Oct 2017 05:29) (93% - 95%)    I&O's Summary    18 Oct 2017 07:01  -  19 Oct 2017 07:00  --------------------------------------------------------  IN: 798 mL / OUT: 2400 mL / NET: -1602 mL    Constitutional: NAD  HEENT: anicteric sclera, oropharynx clear, MMM  Neck: No JVD  Respiratory: CTAB, no wheezes, rales or rhonchi  Cardiovascular: S1, S2, RRR  Gastrointestinal: BS+, soft, NT/ND  Extremities: No cyanosis or clubbing. No peripheral edema  Neurological: A/O x 3, no focal deficits  Psychiatric: Normal mood, normal affect  : No CVA tenderness. No rosa.   Skin: No rashes  Vascular Access: RIJ tunneled cath     LABS:  CAPILLARY BLOOD GLUCOSE  89 (19 Oct 2017 09:10)      POCT Blood Glucose.: 89 mg/dL (19 Oct 2017 09:07)  POCT Blood Glucose.: 135 mg/dL (18 Oct 2017 21:45)  POCT Blood Glucose.: 182 mg/dL (18 Oct 2017 17:14)  POCT Blood Glucose.: 162 mg/dL (18 Oct 2017 13:01)                          10.2   6.54  )-----------( 132      ( 19 Oct 2017 07:25 )             32.5     10-19    131<L>  |  92<L>  |  29<H>  ----------------------------<  84  4.4   |  22  |  4.60<H>    Ca    8.8      19 Oct 2017 07:25      PT/INR - ( 19 Oct 2017 07:25 )   PT: 18.5 SEC;   INR: 1.64                    RADIOLOGY & ADDITIONAL TESTS:    Imaging Personally Reviewed: 10/18/17 TTE  PROCEDURE: Transthoracic echocardiogram with 2-D, M-Mode  and complete spectral and color flow Doppler.  Patient was injected with 10 cc's of aerosolized saline.  INDICATION: Unspecified atrial fibrillation (I48.91),  Unspecified atrial flutter (I48.92)  ------------------------------------------------------------------------  DIMENSIONS:  Dimensions:     Normal Values:  LA:     5.4 cm    2.0 - 4.0 cm  Ao:     2.4 cm    2.0 - 3.8 cm  SEPTUM: 0.7 cm    0.6 - 1.2 cm  PWT:    1.2 cm    0.6 - 1.1 cm  LVIDd:  6.2 cm    3.0 - 5.6 cm  LVIDs:  5.6 cm    1.8 - 4.0 cm  Derived Variables:  LVMI: 123 g/m2  RWT: 0.38  Fractional short: 10 %  Ejection Fraction (Teicholtz): 21 %  ------------------------------------------------------------------------  OBSERVATIONS:  Mitral Valve: Normal mitral valve. Mild mitral  regurgitation.  Aortic Root: Normal aortic root.  Aortic Valve: Normal trileaflet aortic valve.  Left Atrium: Severely dilated left atrium.  LA volume index  = 56 cc/m2.  Left Ventricle: Severe global left ventricular systolic  dysfunction. Flattening of the interventricular septum in  both systole and diastole is  consistent with right  ventricular pressure overload. Moderate left ventricular  enlargement.  Right Heart: Moderate right atrial enlargement. Right  ventricular enlargement with decreased right ventricular  systolic function. A device wire is noted in the right  heart. Normal tricuspid valve.  Moderate tricuspid  regurgitation. Normal pulmonic valve. Mild pulmonic  regurgitation.  Pericardium/PleuraNormal pericardium with trace pericardial  effusion.  Hemodynamic: Estimated right ventricular systolic pressure  equals 55 mm Hg, assuming right atrial pressure equals 10  mm Hg, consistent with moderate pulmonary hypertension.  Agitated saline injection resulted in a large amount of  bubbles seen in the right heart. Although the exact amount  of cardiac cycles that occurred prior to the crossing of  the bubbles is unclear, the findings are suggestive of a  significant shunt. Consider MARGIE for further workup, if  clinically warrnated.  ------------------------------------------------------------------------  CONCLUSIONS:  1. Normal trileaflet aortic valve.  2. Severely dilated left atrium.  LA volume index = 56  cc/m2.  3. Moderate left ventricular enlargement.  4. Severe global left ventricular systolic dysfunction.  Flattening of the interventricular septum in both systole  and diastole is  consistent with right ventricular pressure  overload.  5. Right ventricular enlargement with decreased right  ventricular systolic function. A device wire is noted in  the right heart.  6. Normal tricuspid valve.  Moderate tricuspid  regurgitation.  7. Normal pulmonic valve. Mild pulmonic regurgitation.  Estimated pulmonary artery systolic pressure equals 55 mm  Hg, assuming right atrial pressure equals 10  mm Hg,  consistent with moderate pulmonary hypertension.  8. Agitated saline injection resulted in a large amount of  bubbles seen in the right heart. Although the exact amount  of cardiac cycles that occurred prior to the crossing of  the bubbles is unclear, the findings are suggestive of a  significant shunt. Consider MARGIE for further workup, if  clinically warrnated.  ------------------------------------------------------------------------  Confirmed on  10/18/2017 - 17:36:22 by Maureen Nixon M.D. DENAE      Consultant(s) Notes Reviewed:      Care Discussed with Consultants/Other Providers:

## 2023-12-15 NOTE — PROVIDER CONTACT NOTE (EICU) - NAME OF MD/NP/PA/DO NOTIFIED:
- chronic, stable, no weight gain or loss in the last 12 months   - encouraged healthy diet and regular physical activity   - HBA1C today for DM screening    Yun, RN

## 2024-01-01 NOTE — ED PROVIDER NOTE - PMH
AF (atrial fibrillation)    BPH (benign prostatic hypertrophy)    CHF (congestive heart failure)    Chronic hypotension    COPD (chronic obstructive pulmonary disease)  4L home O2  DM (diabetes mellitus)  Off Insulin since 7/2017  ESRD (end stage renal disease) on dialysis    Gout    HLD (hyperlipidemia)    Myocardial infarction  10/2011  Seizure  Off Keppra since 7/2017 .

## 2024-03-07 NOTE — H&P ADULT - PROBLEM/PLAN-1
BP under good control, 124/80 in office today.  Continue Carvedilol 12.5 mg BID.   DISPLAY PLAN FREE TEXT

## 2024-05-09 NOTE — H&P ADULT. - VENOUS THROMBOEMBOLISM HISTORY
Bill    Thanks for doing the labs.  The kidney lab is slightly above the upper limit of normal, suggestive of dehydration.  This has been noted in the past.  Will plan to recheck this this summer when you are back for your appointment.    In the meantime, please drink lots of fluid daily, at least four 12 oz glasses of fluid daily.  Additionally, please avoid over-the-counter arthritis pills such as Advil ibuprofen Aleve or naproxen.  Tylenol would be fine and would not adversely affect the kidney.    Looking forward to seeing you mid summer as scheduled.    Sincerely,  Miguel A Casanova MD   prior major surgery

## 2025-01-08 NOTE — PATIENT PROFILE ADULT. - AS SC BRADEN MOISTURE
PRIOR AUTHORIZATION DENIED    Medication: FREE[place] 3 [place] [facility] [place] Company: [employer] - Phone [phone] Fax [phone]    Denial Date: 1/8/2025    Denial Reason(s):         Appeal Information:          (4) rarely moist

## 2025-02-18 NOTE — ED PROVIDER NOTE - CROS ED GI ALL NEG
Orders:  •  ALPRAZolam (XANAX) 0.25 mg tablet; Take 1 tablet (0.25 mg total) by mouth 3 (three) times a day as needed for anxiety  
negative...

## 2025-03-25 NOTE — H&P ADULT - PROBLEM SELECTOR PROBLEM 9
[facility] provides services at a reduced cost to those who are determined to be eligible through [facility]’s financial assistance program. Information regarding [facility]’s financial assistance program can be found by going to [email or web address] or by calling [phone]. Need for prophylactic measure Benign prostatic hyperplasia

## 2025-06-25 NOTE — H&P ADULT - PROBLEM SELECTOR PLAN 9
Airway  Date/Time: 6/25/2025 9:47 AM  Reason: elective    Airway not difficult    Staffing  Performed: FATOUMATA   Authorized by: Rudi Davis MD    Performed by: FATOUMATA Valdez  Patient location during procedure: OR    Patient Condition  Indications for airway management: anesthesia and airway protection  Patient position: sniffing  MILS maintained throughout  Planned trial extubation  Sedation level: deep     Final Airway Details   Preoxygenated: yes  Final airway type: endotracheal airway  Successful airway: ETT  Cuffed: yes   Successful intubation technique: video laryngoscopy (Griffin 3)  Adjuncts used in placement: intubating stylet  Endotracheal tube insertion site: oral  Blade: Nayla  Blade size: #3  ETT size (mm): 7.0  Cormack-Lehane Classification: grade I - full view of glottis  Placement verified by: chest auscultation and capnometry   Measured from: lips  ETT to lips (cm): 20  Number of attempts at approach: 1  Number of other approaches attempted: 0    Additional Comments  Easy grade I view. Teeth and lips in pre-op condition. LTA kit used..          Intermittent Pneumatic Compression Device/MARIANN stockings